# Patient Record
Sex: FEMALE | Race: WHITE | NOT HISPANIC OR LATINO | Employment: OTHER | URBAN - METROPOLITAN AREA
[De-identification: names, ages, dates, MRNs, and addresses within clinical notes are randomized per-mention and may not be internally consistent; named-entity substitution may affect disease eponyms.]

---

## 2017-01-10 RX ORDER — CARVEDILOL 25 MG/1
25 TABLET ORAL 2 TIMES DAILY WITH MEALS
Qty: 180 TABLET | Refills: 1 | Status: SHIPPED | OUTPATIENT
Start: 2017-01-10 | End: 2017-09-29 | Stop reason: SDUPTHER

## 2017-01-10 RX ORDER — CARVEDILOL 25 MG/1
TABLET ORAL
Qty: 180 TABLET | Refills: 1 | Status: SHIPPED | OUTPATIENT
Start: 2017-01-10 | End: 2017-01-10 | Stop reason: SDUPTHER

## 2017-01-25 RX ORDER — LISINOPRIL 20 MG/1
20 TABLET ORAL DAILY
Qty: 90 TABLET | Refills: 2 | Status: SHIPPED | OUTPATIENT
Start: 2017-01-25 | End: 2017-02-17 | Stop reason: DRUGHIGH

## 2017-02-09 DIAGNOSIS — R73.9 HYPERGLYCEMIA: ICD-10-CM

## 2017-02-09 DIAGNOSIS — I10 ESSENTIAL HYPERTENSION: Primary | ICD-10-CM

## 2017-02-09 DIAGNOSIS — E78.5 HYPERLIPIDEMIA, UNSPECIFIED HYPERLIPIDEMIA TYPE: ICD-10-CM

## 2017-02-10 LAB
ALBUMIN SERPL-MCNC: 4.4 G/DL (ref 3.5–5.2)
ALBUMIN/GLOB SERPL: 2 G/DL
ALP SERPL-CCNC: 52 U/L (ref 39–117)
ALT SERPL-CCNC: 23 U/L (ref 1–33)
AST SERPL-CCNC: 19 U/L (ref 1–32)
BILIRUB SERPL-MCNC: 0.2 MG/DL (ref 0.1–1.2)
BUN SERPL-MCNC: 27 MG/DL (ref 6–20)
BUN/CREAT SERPL: 27.6 (ref 7–25)
CALCIUM SERPL-MCNC: 9.7 MG/DL (ref 8.6–10.5)
CHLORIDE SERPL-SCNC: 102 MMOL/L (ref 98–107)
CHOLEST SERPL-MCNC: 193 MG/DL (ref 0–200)
CO2 SERPL-SCNC: 31.6 MMOL/L (ref 22–29)
CREAT SERPL-MCNC: 0.98 MG/DL (ref 0.57–1)
GLOBULIN SER CALC-MCNC: 2.2 GM/DL
GLUCOSE SERPL-MCNC: 114 MG/DL (ref 65–99)
HBA1C MFR BLD: 5.87 % (ref 4.8–5.6)
HDLC SERPL-MCNC: 59 MG/DL (ref 40–60)
LDLC SERPL CALC-MCNC: 103 MG/DL (ref 0–100)
LDLC/HDLC SERPL: 1.74 {RATIO}
POTASSIUM SERPL-SCNC: 5.4 MMOL/L (ref 3.5–5.2)
PROT SERPL-MCNC: 6.6 G/DL (ref 6–8.5)
SODIUM SERPL-SCNC: 144 MMOL/L (ref 136–145)
TRIGL SERPL-MCNC: 157 MG/DL (ref 0–150)
VLDLC SERPL CALC-MCNC: 31.4 MG/DL (ref 5–40)

## 2017-02-17 ENCOUNTER — OFFICE VISIT (OUTPATIENT)
Dept: INTERNAL MEDICINE | Facility: CLINIC | Age: 57
End: 2017-02-17

## 2017-02-17 VITALS
DIASTOLIC BLOOD PRESSURE: 88 MMHG | OXYGEN SATURATION: 99 % | HEART RATE: 68 BPM | SYSTOLIC BLOOD PRESSURE: 146 MMHG | WEIGHT: 218 LBS | BODY MASS INDEX: 41.19 KG/M2

## 2017-02-17 DIAGNOSIS — I10 ESSENTIAL HYPERTENSION: ICD-10-CM

## 2017-02-17 DIAGNOSIS — E87.5 HYPERKALEMIA: ICD-10-CM

## 2017-02-17 DIAGNOSIS — M89.8X1 PAIN OF RIGHT SCAPULA: Primary | ICD-10-CM

## 2017-02-17 DIAGNOSIS — E78.5 HYPERLIPIDEMIA, UNSPECIFIED HYPERLIPIDEMIA TYPE: ICD-10-CM

## 2017-02-17 LAB — POTASSIUM SERPL-SCNC: 4.8 MMOL/L (ref 3.5–5.2)

## 2017-02-17 PROCEDURE — 99214 OFFICE O/P EST MOD 30 MIN: CPT | Performed by: INTERNAL MEDICINE

## 2017-02-17 RX ORDER — GABAPENTIN 300 MG/1
300 CAPSULE ORAL 3 TIMES DAILY
Qty: 90 CAPSULE | Refills: 2 | Status: SHIPPED | OUTPATIENT
Start: 2017-02-17 | End: 2017-08-28 | Stop reason: SDUPTHER

## 2017-02-17 RX ORDER — LISINOPRIL 40 MG/1
40 TABLET ORAL DAILY
Qty: 30 TABLET | Refills: 4 | Status: SHIPPED | OUTPATIENT
Start: 2017-02-17 | End: 2017-04-03 | Stop reason: SDUPTHER

## 2017-02-17 RX ORDER — CYCLOBENZAPRINE HCL 10 MG
10 TABLET ORAL 3 TIMES DAILY PRN
Qty: 30 TABLET | Refills: 3 | Status: SHIPPED | OUTPATIENT
Start: 2017-02-17 | End: 2017-09-29 | Stop reason: SDUPTHER

## 2017-02-17 NOTE — PROGRESS NOTES
Chief Complaint   Patient presents with   • Breast Pain     Right side   • Results     blood work     Hypertension, hyperlipidemia, breast cancer, familia, CMT    History of Present Illness   Martha Davey is a 56 y.o. female presents for follow up evaluation. Having some discomfort on her right side from the scapula and radiates forward. First noted discomfort in September. She went to er and chiropracter. Gets benefit w/ chiropracter and now is in physical therapy. Previously given naproxen and clyclobenzaprine for prn usage. She is now using cyclobenzaprine qo night as well as naproxen 500 mg qd-bid. She has been out of these medications for last 2 weeks and just using tylenol. Pain has been greater off med. She has reduced gabapentin as she has concern for bladder discomfort. Due to chronic Charcot ambika tooth she uses a cane. She is supporting her weight on the right arm and carrying a purse w/ the right arm. bp is elevated. Lipids are at goal.           The following portions of the patient's history were reviewed and updated as appropriate: allergies, current medications, past family history, past medical history, past social history, past surgical history and problem list.  Current Outpatient Prescriptions on File Prior to Visit   Medication Sig Dispense Refill   • atorvastatin (LIPITOR) 10 MG tablet Take 1 tablet by mouth Daily. 90 tablet 1   • carvedilol (COREG) 25 MG tablet Take 1 tablet by mouth 2 (Two) Times a Day With Meals. 180 tablet 1   • fenofibrate (TRICOR) 145 MG tablet Take 1 tablet by mouth daily. 30 tablet 6   • FLONASE ALLERGY RELIEF 50 MCG/ACT nasal spray 2 sprays into each nostril daily.  11   • letrozole (FEMARA) 2.5 MG tablet Take 2.5 mg by mouth daily.     • [DISCONTINUED] gabapentin (NEURONTIN) 400 MG capsule Take 1 capsule by mouth Every Evening. 30 capsule 3   • [DISCONTINUED] gabapentin (NEURONTIN) 600 MG tablet TAKE 1 TABLET AT BEDTIME 90 tablet 2   • [DISCONTINUED] lisinopril  (PRINIVIL,ZESTRIL) 20 MG tablet Take 1 tablet by mouth Daily. 90 tablet 2   • mesalamine (LIALDA) 1.2 G EC tablet Take 1,200 mg by mouth 2 (two) times a day.     • nitrofurantoin, macrocrystal-monohydrate, (MACROBID) 100 MG capsule Take 100 mg by mouth daily.     • omeprazole (PriLOSEC) 40 MG capsule TAKE 1 CAPSULE DAILY 90 capsule 2     No current facility-administered medications on file prior to visit.      Review of Systems   Constitutional: Negative.    HENT: Negative.    Eyes: Negative.    Respiratory: Negative.    Cardiovascular: Negative.    Gastrointestinal: Negative.    Endocrine: Negative.    Genitourinary: Negative.    Musculoskeletal:        Right scapular pain   Skin: Negative.    Allergic/Immunologic: Negative.    Neurological: Negative.    Hematological: Negative.    Psychiatric/Behavioral: Negative.        Objective   Physical Exam   Constitutional: She is oriented to person, place, and time. She appears well-developed and well-nourished.   HENT:   Head: Normocephalic and atraumatic.   Right Ear: External ear normal.   Left Ear: External ear normal.   Nose: Nose normal.   Mouth/Throat: Oropharynx is clear and moist.   Eyes: Conjunctivae and EOM are normal. Pupils are equal, round, and reactive to light.   Neck: Normal range of motion. Neck supple.   Cardiovascular: Normal rate, regular rhythm, normal heart sounds and intact distal pulses.    Pulmonary/Chest: Effort normal and breath sounds normal.   Abdominal: Soft. Bowel sounds are normal.   Musculoskeletal:   Mild point tenderness right scapula   Neurological: She is alert and oriented to person, place, and time. She has normal reflexes.   Skin: Skin is warm and dry.   Psychiatric: She has a normal mood and affect. Her behavior is normal. Judgment and thought content normal.   Nursing note and vitals reviewed.       Visit Vitals   • /88   • Pulse 68   • Wt 218 lb (98.9 kg)   • SpO2 99%   • BMI 41.19 kg/m2       Assessment/Plan   Diagnoses  and all orders for this visit:    Pain of right scapula  -     Ambulatory Referral to Pain Management    Hyperkalemia  -     Potassium; Future    Hyperlipidemia, unspecified hyperlipidemia type    Essential hypertension    Other orders  -     lisinopril (PRINIVIL,ZESTRIL) 40 MG tablet; Take 1 tablet by mouth Daily.  -     gabapentin (NEURONTIN) 300 MG capsule; Take 1 capsule by mouth 3 (Three) Times a Day.  -     cyclobenzaprine (FLEXERIL) 10 MG tablet; Take 1 tablet by mouth 3 (Three) Times a Day As Needed for muscle spasms.      Patient w/ hypertension- will increase lisinopril to 40 daily if potassium is wnl today (suspect initial was spurious). Will continue med for lipids as this is working well. She will go to pain management for scapular pain and continue w/ PT and prn chiropracter. She is advised to d/c nsaids. May split gabapentin dose and use flexeril when needed. She should off load her left shoulder and work to put less pressure on it while ambulating. F/u routinely.   bp test 2 weeks.

## 2017-02-27 ENCOUNTER — OFFICE VISIT (OUTPATIENT)
Dept: INTERNAL MEDICINE | Facility: CLINIC | Age: 57
End: 2017-02-27

## 2017-02-27 VITALS
OXYGEN SATURATION: 98 % | DIASTOLIC BLOOD PRESSURE: 74 MMHG | HEART RATE: 61 BPM | SYSTOLIC BLOOD PRESSURE: 132 MMHG | BODY MASS INDEX: 40.62 KG/M2 | WEIGHT: 215 LBS

## 2017-02-27 DIAGNOSIS — G47.33 OSA (OBSTRUCTIVE SLEEP APNEA): ICD-10-CM

## 2017-02-27 DIAGNOSIS — R31.9 HEMATURIA: ICD-10-CM

## 2017-02-27 DIAGNOSIS — M54.6 CHRONIC RIGHT-SIDED THORACIC BACK PAIN: ICD-10-CM

## 2017-02-27 DIAGNOSIS — R39.9 UTI SYMPTOMS: Primary | ICD-10-CM

## 2017-02-27 DIAGNOSIS — G89.29 CHRONIC RIGHT-SIDED THORACIC BACK PAIN: ICD-10-CM

## 2017-02-27 PROBLEM — K57.20 DIVERTICULITIS OF COLON WITH PERFORATION: Status: ACTIVE | Noted: 2017-02-27

## 2017-02-27 PROBLEM — K21.9 GASTROESOPHAGEAL REFLUX DISEASE: Status: ACTIVE | Noted: 2017-02-27

## 2017-02-27 PROBLEM — K63.5 COLON POLYP: Status: ACTIVE | Noted: 2017-02-27

## 2017-02-27 LAB
BILIRUB BLD-MCNC: NEGATIVE MG/DL
CLARITY, POC: CLEAR
COLOR UR: YELLOW
GLUCOSE UR STRIP-MCNC: NEGATIVE MG/DL
KETONES UR QL: NEGATIVE
LEUKOCYTE EST, POC: ABNORMAL
NITRITE UR-MCNC: NEGATIVE MG/ML
PH UR: 5.5 [PH] (ref 5–8)
PROT UR STRIP-MCNC: NEGATIVE MG/DL
RBC # UR STRIP: ABNORMAL /UL
SP GR UR: 1.01 (ref 1–1.03)
UROBILINOGEN UR QL: NORMAL

## 2017-02-27 PROCEDURE — 99214 OFFICE O/P EST MOD 30 MIN: CPT | Performed by: INTERNAL MEDICINE

## 2017-02-27 PROCEDURE — 81003 URINALYSIS AUTO W/O SCOPE: CPT | Performed by: INTERNAL MEDICINE

## 2017-02-27 NOTE — PROGRESS NOTES
Chief Complaint   Patient presents with   • Urinary Tract Infection   • Sleep Apnea       History of Present Illness   Martha Davey is a 56 y.o. female presents for follow up evaluation. Doing well today. Has MARIA M. Needs a new mask and possibly a new CPAP device. She has not been to her sleep medicine specialist in at least 5 years. She is wearing the device throughout the night for at least 6-7 hours nightly. Has not had settings or fit checked in a long time. She is having good sleep.   Has a h/o recurrent UTI. Started w/ hematuria on thur fri. Off of prophylactic macrobid per urology instructions  (Dr. May Johnson) as she was doing well after bladder procedure.   Continued discomfort in the right scapular region. Referred to pain management for possible trigger point injections on 2/17 but has not received contact from scheduling for this.       The following portions of the patient's history were reviewed and updated as appropriate: allergies, current medications, past family history, past medical history, past social history, past surgical history and problem list.  Current Outpatient Prescriptions on File Prior to Visit   Medication Sig Dispense Refill   • atorvastatin (LIPITOR) 10 MG tablet Take 1 tablet by mouth Daily. 90 tablet 1   • carvedilol (COREG) 25 MG tablet Take 1 tablet by mouth 2 (Two) Times a Day With Meals. 180 tablet 1   • cyclobenzaprine (FLEXERIL) 10 MG tablet Take 1 tablet by mouth 3 (Three) Times a Day As Needed for muscle spasms. 30 tablet 3   • fenofibrate (TRICOR) 145 MG tablet Take 1 tablet by mouth daily. 30 tablet 6   • FLONASE ALLERGY RELIEF 50 MCG/ACT nasal spray 2 sprays into each nostril daily.  11   • gabapentin (NEURONTIN) 300 MG capsule Take 1 capsule by mouth 3 (Three) Times a Day. 90 capsule 2   • letrozole (FEMARA) 2.5 MG tablet Take 2.5 mg by mouth daily.     • lisinopril (PRINIVIL,ZESTRIL) 40 MG tablet Take 1 tablet by mouth Daily. 30 tablet 4   • mesalamine (LIALDA) 1.2  G EC tablet Take 1,200 mg by mouth 2 (two) times a day.     • nitrofurantoin, macrocrystal-monohydrate, (MACROBID) 100 MG capsule Take 100 mg by mouth daily.     • omeprazole (PriLOSEC) 40 MG capsule TAKE 1 CAPSULE DAILY 90 capsule 2     No current facility-administered medications on file prior to visit.      Review of Systems   Constitutional: Negative.    HENT: Negative.    Eyes: Negative.    Respiratory: Negative.    Cardiovascular: Negative.    Gastrointestinal: Negative.    Endocrine: Negative.    Genitourinary: Positive for dysuria, frequency, hematuria and urgency.   Musculoskeletal: Positive for back pain.   Skin: Negative.    Allergic/Immunologic: Negative.    Neurological: Negative.    Hematological: Negative.    Psychiatric/Behavioral: Negative.        Objective   Physical Exam   Constitutional: She is oriented to person, place, and time. She appears well-developed and well-nourished.   HENT:   Head: Normocephalic and atraumatic.   Right Ear: External ear normal.   Left Ear: External ear normal.   Mouth/Throat: Oropharynx is clear and moist.   Eyes: Conjunctivae and EOM are normal. Pupils are equal, round, and reactive to light.   Neck: Normal range of motion. Neck supple.   Cardiovascular: Normal rate, regular rhythm, normal heart sounds and intact distal pulses.    Pulmonary/Chest: Effort normal and breath sounds normal.   Abdominal: Soft. Bowel sounds are normal.   Musculoskeletal: Normal range of motion.   Neurological: She is alert and oriented to person, place, and time. She has normal reflexes.   Skin: Skin is warm and dry.   Psychiatric: She has a normal mood and affect. Her behavior is normal. Judgment and thought content normal.   Nursing note and vitals reviewed.       Visit Vitals   • /74   • Pulse 61   • Wt 215 lb (97.5 kg)   • SpO2 98%   • BMI 40.62 kg/m2       Assessment/Plan   Diagnoses and all orders for this visit:    UTI symptoms  -     POC Urinalysis Dipstick, Automated  -      Urine Culture    MARIA M (obstructive sleep apnea)  -     Ambulatory Referral to Sleep Medicine    Hematuria    Chronic right-sided thoracic back pain      Patient w/ hematuria/ likely UTI. Will complete bid macrobid for 7 days. Check urine culture and may need to change antibiotic will check culture result. She has MARIA M. Needs to have CPAP settings and mask fit evaluated. Wrote rx to update mask and tubing and referred to sleep medicine specialist. She is having back pain. Will check on appointment w/ pain management for possible trigger point injections. F/u prn.

## 2017-03-01 LAB
BACTERIA UR CULT: NORMAL
BACTERIA UR CULT: NORMAL

## 2017-03-17 ENCOUNTER — TELEPHONE (OUTPATIENT)
Dept: INTERNAL MEDICINE | Facility: CLINIC | Age: 57
End: 2017-03-17

## 2017-03-17 RX ORDER — MELOXICAM 15 MG/1
15 TABLET ORAL DAILY
Qty: 30 TABLET | Refills: 6 | Status: SHIPPED | OUTPATIENT
Start: 2017-03-17 | End: 2017-12-16 | Stop reason: HOSPADM

## 2017-03-17 NOTE — TELEPHONE ENCOUNTER
Would like a referral to [facility]ert for pain priscila as Confucianist pain priscila is taking to long also she would like to add mobic  To see if that can help. She is taking 3 tylenol qd.

## 2017-04-03 RX ORDER — LISINOPRIL 40 MG/1
40 TABLET ORAL DAILY
Qty: 90 TABLET | Refills: 1 | Status: SHIPPED | OUTPATIENT
Start: 2017-04-03 | End: 2017-07-01 | Stop reason: SDUPTHER

## 2017-07-03 RX ORDER — LISINOPRIL 40 MG/1
TABLET ORAL
Qty: 90 TABLET | Refills: 1 | Status: SHIPPED | OUTPATIENT
Start: 2017-07-03 | End: 2017-09-29 | Stop reason: SDUPTHER

## 2017-08-07 ENCOUNTER — TELEPHONE (OUTPATIENT)
Dept: INTERNAL MEDICINE | Facility: CLINIC | Age: 57
End: 2017-08-07

## 2017-08-07 DIAGNOSIS — G47.33 OSA (OBSTRUCTIVE SLEEP APNEA): Primary | ICD-10-CM

## 2017-08-28 RX ORDER — GABAPENTIN 300 MG/1
300 CAPSULE ORAL 3 TIMES DAILY
Qty: 90 CAPSULE | Refills: 2 | Status: SHIPPED | OUTPATIENT
Start: 2017-08-28 | End: 2018-02-26 | Stop reason: SDUPTHER

## 2017-09-24 DIAGNOSIS — R31.9 HEMATURIA: ICD-10-CM

## 2017-09-24 DIAGNOSIS — I10 ESSENTIAL HYPERTENSION: ICD-10-CM

## 2017-09-24 DIAGNOSIS — E78.5 HYPERLIPIDEMIA, UNSPECIFIED HYPERLIPIDEMIA TYPE: Primary | ICD-10-CM

## 2017-09-24 DIAGNOSIS — K21.9 GASTROESOPHAGEAL REFLUX DISEASE, ESOPHAGITIS PRESENCE NOT SPECIFIED: ICD-10-CM

## 2017-09-24 DIAGNOSIS — R73.9 HYPERGLYCEMIA: ICD-10-CM

## 2017-09-25 ENCOUNTER — LAB (OUTPATIENT)
Dept: INTERNAL MEDICINE | Facility: CLINIC | Age: 57
End: 2017-09-25

## 2017-09-25 PROCEDURE — G0008 ADMIN INFLUENZA VIRUS VAC: HCPCS | Performed by: INTERNAL MEDICINE

## 2017-09-25 PROCEDURE — 90656 IIV3 VACC NO PRSV 0.5 ML IM: CPT | Performed by: INTERNAL MEDICINE

## 2017-09-27 LAB
ALBUMIN SERPL-MCNC: 4.4 G/DL (ref 3.5–5.2)
ALBUMIN/GLOB SERPL: 2.2 G/DL
ALP SERPL-CCNC: 72 U/L (ref 39–117)
ALT SERPL-CCNC: 16 U/L (ref 1–33)
APPEARANCE UR: CLEAR
AST SERPL-CCNC: 15 U/L (ref 1–32)
BACTERIA #/AREA URNS HPF: NORMAL /HPF
BACTERIA UR CULT: NORMAL
BACTERIA UR CULT: NORMAL
BASOPHILS # BLD AUTO: 0.05 10*3/MM3 (ref 0–0.2)
BASOPHILS NFR BLD AUTO: 0.8 % (ref 0–1.5)
BILIRUB SERPL-MCNC: 0.3 MG/DL (ref 0.1–1.2)
BILIRUB UR QL STRIP: NEGATIVE
BUN SERPL-MCNC: 23 MG/DL (ref 6–20)
BUN/CREAT SERPL: 25.8 (ref 7–25)
CALCIUM SERPL-MCNC: 10.1 MG/DL (ref 8.6–10.5)
CHLORIDE SERPL-SCNC: 101 MMOL/L (ref 98–107)
CHOLEST SERPL-MCNC: 166 MG/DL (ref 0–200)
CO2 SERPL-SCNC: 31.2 MMOL/L (ref 22–29)
COLOR UR: YELLOW
CREAT SERPL-MCNC: 0.89 MG/DL (ref 0.57–1)
EOSINOPHIL # BLD AUTO: 0.2 10*3/MM3 (ref 0–0.7)
EOSINOPHIL NFR BLD AUTO: 3.2 % (ref 0.3–6.2)
EPI CELLS #/AREA URNS HPF: NORMAL /HPF
ERYTHROCYTE [DISTWIDTH] IN BLOOD BY AUTOMATED COUNT: 13 % (ref 11.7–13)
GLOBULIN SER CALC-MCNC: 2 GM/DL
GLUCOSE SERPL-MCNC: 110 MG/DL (ref 65–99)
GLUCOSE UR QL: NEGATIVE
HCT VFR BLD AUTO: 42 % (ref 35.6–45.5)
HDLC SERPL-MCNC: 55 MG/DL (ref 40–60)
HGB BLD-MCNC: 13 G/DL (ref 11.9–15.5)
HGB UR QL STRIP: NEGATIVE
IMM GRANULOCYTES # BLD: 0.02 10*3/MM3 (ref 0–0.03)
IMM GRANULOCYTES NFR BLD: 0.3 % (ref 0–0.5)
KETONES UR QL STRIP: NEGATIVE
LDLC SERPL CALC-MCNC: 70 MG/DL (ref 0–100)
LDLC/HDLC SERPL: 1.28 {RATIO}
LEUKOCYTE ESTERASE UR QL STRIP: NEGATIVE
LYMPHOCYTES # BLD AUTO: 1.7 10*3/MM3 (ref 0.9–4.8)
LYMPHOCYTES NFR BLD AUTO: 27.4 % (ref 19.6–45.3)
MCH RBC QN AUTO: 29.4 PG (ref 26.9–32)
MCHC RBC AUTO-ENTMCNC: 31 G/DL (ref 32.4–36.3)
MCV RBC AUTO: 95 FL (ref 80.5–98.2)
MICRO URNS: ABNORMAL
MONOCYTES # BLD AUTO: 0.47 10*3/MM3 (ref 0.2–1.2)
MONOCYTES NFR BLD AUTO: 7.6 % (ref 5–12)
MUCOUS THREADS URNS QL MICRO: PRESENT /HPF
NEUTROPHILS # BLD AUTO: 3.77 10*3/MM3 (ref 1.9–8.1)
NEUTROPHILS NFR BLD AUTO: 60.7 % (ref 42.7–76)
NITRITE UR QL STRIP: POSITIVE
PH UR STRIP: 7 [PH] (ref 5–7.5)
PLATELET # BLD AUTO: 281 10*3/MM3 (ref 140–500)
POTASSIUM SERPL-SCNC: 4.9 MMOL/L (ref 3.5–5.2)
PROT SERPL-MCNC: 6.4 G/DL (ref 6–8.5)
PROT UR QL STRIP: NEGATIVE
RBC # BLD AUTO: 4.42 10*6/MM3 (ref 3.9–5.2)
RBC #/AREA URNS HPF: NORMAL /HPF
SODIUM SERPL-SCNC: 143 MMOL/L (ref 136–145)
SP GR UR: 1.01 (ref 1–1.03)
TRIGL SERPL-MCNC: 203 MG/DL (ref 0–150)
TSH SERPL DL<=0.005 MIU/L-ACNC: 2.65 MIU/ML (ref 0.27–4.2)
URINALYSIS REFLEX: ABNORMAL
UROBILINOGEN UR STRIP-MCNC: 0.2 MG/DL (ref 0.2–1)
VLDLC SERPL CALC-MCNC: 40.6 MG/DL (ref 5–40)
WBC # BLD AUTO: 6.21 10*3/MM3 (ref 4.5–10.7)
WBC #/AREA URNS HPF: NORMAL /HPF

## 2017-09-29 ENCOUNTER — HOSPITAL ENCOUNTER (OUTPATIENT)
Dept: SLEEP MEDICINE | Facility: HOSPITAL | Age: 57
Discharge: HOME | End: 2017-09-29
Admitting: INTERNAL MEDICINE

## 2017-09-29 ENCOUNTER — OFFICE VISIT (OUTPATIENT)
Dept: INTERNAL MEDICINE | Facility: CLINIC | Age: 57
End: 2017-09-29

## 2017-09-29 VITALS
HEIGHT: 62 IN | BODY MASS INDEX: 39.56 KG/M2 | SYSTOLIC BLOOD PRESSURE: 130 MMHG | OXYGEN SATURATION: 98 % | WEIGHT: 215 LBS | HEART RATE: 75 BPM | DIASTOLIC BLOOD PRESSURE: 70 MMHG

## 2017-09-29 DIAGNOSIS — Z00.00 HEALTHCARE MAINTENANCE: Primary | ICD-10-CM

## 2017-09-29 DIAGNOSIS — R73.9 HYPERGLYCEMIA: ICD-10-CM

## 2017-09-29 DIAGNOSIS — E78.5 HYPERLIPIDEMIA, UNSPECIFIED HYPERLIPIDEMIA TYPE: ICD-10-CM

## 2017-09-29 DIAGNOSIS — M54.6 MIDLINE THORACIC BACK PAIN, UNSPECIFIED CHRONICITY: ICD-10-CM

## 2017-09-29 DIAGNOSIS — I10 ESSENTIAL HYPERTENSION: ICD-10-CM

## 2017-09-29 PROCEDURE — G0463 HOSPITAL OUTPT CLINIC VISIT: HCPCS

## 2017-09-29 PROCEDURE — G0439 PPPS, SUBSEQ VISIT: HCPCS | Performed by: INTERNAL MEDICINE

## 2017-09-29 PROCEDURE — 99213 OFFICE O/P EST LOW 20 MIN: CPT | Performed by: INTERNAL MEDICINE

## 2017-09-29 RX ORDER — CARVEDILOL 25 MG/1
25 TABLET ORAL 2 TIMES DAILY WITH MEALS
Qty: 180 TABLET | Refills: 3 | Status: SHIPPED | OUTPATIENT
Start: 2017-09-29 | End: 2017-12-05

## 2017-09-29 RX ORDER — CYCLOBENZAPRINE HCL 10 MG
10 TABLET ORAL 3 TIMES DAILY PRN
Qty: 30 TABLET | Refills: 3 | Status: SHIPPED | OUTPATIENT
Start: 2017-09-29 | End: 2018-02-26 | Stop reason: SDUPTHER

## 2017-09-29 RX ORDER — DIAZEPAM 2 MG/1
2 TABLET ORAL 2 TIMES DAILY PRN
Qty: 3 TABLET | Refills: 2 | Status: SHIPPED | OUTPATIENT
Start: 2017-09-29 | End: 2017-10-10 | Stop reason: SDUPTHER

## 2017-09-29 RX ORDER — LISINOPRIL 40 MG/1
40 TABLET ORAL DAILY
Qty: 90 TABLET | Refills: 3 | Status: SHIPPED | OUTPATIENT
Start: 2017-09-29 | End: 2017-12-05

## 2017-09-29 RX ORDER — ATORVASTATIN CALCIUM 10 MG/1
10 TABLET, FILM COATED ORAL DAILY
Qty: 90 TABLET | Refills: 3 | Status: SHIPPED | OUTPATIENT
Start: 2017-09-29 | End: 2018-02-26

## 2017-09-29 RX ORDER — DIAZEPAM 2 MG/1
2 TABLET ORAL 2 TIMES DAILY PRN
Qty: 3 TABLET | Refills: 2 | Status: SHIPPED | OUTPATIENT
Start: 2017-09-29 | End: 2017-09-29 | Stop reason: SDUPTHER

## 2017-09-29 NOTE — PROGRESS NOTES
Subjective   CPE, midthoracic back pain,     Martha Davey is a 56 y.o. female who presents for a complete physical exam.  In general she is doing well today. She has some mid thoracic back pain, left shoulder, and right knee pain. she is taking meloxicam daily. She was in office in feb w/ same pain and was referred for physical therapy. She has tried this as well as going to the chiropracter and has persistent discomfort. She has hypertension and bp has been normotensive.        Review of Systems   Constitutional: Negative.    HENT: Negative.    Eyes: Negative.    Respiratory: Negative.    Cardiovascular: Negative.    Gastrointestinal: Negative.    Endocrine: Negative.    Genitourinary: Negative.    Musculoskeletal: Positive for arthralgias.   Skin: Negative.    Allergic/Immunologic: Negative.    Neurological: Negative.    Hematological: Negative.    Psychiatric/Behavioral: Negative.        The following portions of the patient's history were reviewed and updated as appropriate: allergies, current medications, past family history, past medical history, past social history, past surgical history and problem list.     Patient Active Problem List   Diagnosis   • Malignant neoplasm of breast   • Hematuria   • Hyperlipidemia   • Hypertension   • Hereditary sensorimotor neuropathy   • Hyperglycemia   • MARIA M (obstructive sleep apnea)   • Gastroesophageal reflux disease   • Colon polyp   • Diverticulitis of colon with perforation   • Foot pain   • Osteoarthritis of knee   • Leukocytosis   • Osteoarthritis of shoulder   • Senile osteopenia   • Long term current use of aromatase inhibitor   • Chronic right-sided thoracic back pain       Past Medical History:   Diagnosis Date   • Cancer     breast   • Hyperlipidemia    • Hypertension        Past Surgical History:   Procedure Laterality Date   • CHOLECYSTECTOMY     • HERNIA REPAIR         Family History   Problem Relation Age of Onset   • Heart disease Mother    •  Hyperlipidemia Mother    • Diabetes Father        Social History     Social History   • Marital status:      Spouse name: N/A   • Number of children: N/A   • Years of education: N/A     Occupational History   • Not on file.     Social History Main Topics   • Smoking status: Never Smoker   • Smokeless tobacco: Not on file   • Alcohol use Yes      Comment: social   • Drug use: Defer   • Sexual activity: Defer     Other Topics Concern   • Not on file     Social History Narrative   • No narrative on file       Current Outpatient Prescriptions on File Prior to Visit   Medication Sig Dispense Refill   • FLONASE ALLERGY RELIEF 50 MCG/ACT nasal spray 2 sprays into each nostril daily.  11   • gabapentin (NEURONTIN) 300 MG capsule Take 1 capsule by mouth 3 (Three) Times a Day. 90 capsule 2   • letrozole (FEMARA) 2.5 MG tablet Take 2.5 mg by mouth daily.     • meloxicam (MOBIC) 15 MG tablet Take 1 tablet by mouth Daily. 30 tablet 6   • mesalamine (LIALDA) 1.2 G EC tablet Take 1,200 mg by mouth 2 (two) times a day.     • nitrofurantoin, macrocrystal-monohydrate, (MACROBID) 100 MG capsule Take 100 mg by mouth daily.     • omeprazole (PriLOSEC) 40 MG capsule TAKE 1 CAPSULE DAILY 90 capsule 2   • [DISCONTINUED] atorvastatin (LIPITOR) 10 MG tablet Take 1 tablet by mouth Daily. 90 tablet 1   • [DISCONTINUED] carvedilol (COREG) 25 MG tablet Take 1 tablet by mouth 2 (Two) Times a Day With Meals. 180 tablet 1   • [DISCONTINUED] cyclobenzaprine (FLEXERIL) 10 MG tablet Take 1 tablet by mouth 3 (Three) Times a Day As Needed for muscle spasms. 30 tablet 3   • [DISCONTINUED] lisinopril (PRINIVIL,ZESTRIL) 40 MG tablet Take 1 tablet by mouth  daily 90 tablet 1   • fenofibrate (TRICOR) 145 MG tablet Take 1 tablet by mouth daily. 30 tablet 6     No current facility-administered medications on file prior to visit.        Allergies   Allergen Reactions   • Morphine And Related        Immunization History   Administered Date(s) Administered  "  • Flu Vaccine Quad PF >18YRS 09/25/2017   • Influenza TIV (IM) 10/11/2016   • Pneumococcal Conjugate 13-Valent 05/11/2015   • Pneumococcal Polysaccharide 01/01/2008   • Tdap 01/01/2008       Objective     /70  Pulse 75  Ht 62\" (157.5 cm)  Wt 215 lb (97.5 kg)  SpO2 98%  BMI 39.32 kg/m2    Physical Exam   Constitutional: She is oriented to person, place, and time. She appears well-developed and well-nourished.   HENT:   Head: Normocephalic and atraumatic.   Right Ear: External ear normal.   Left Ear: External ear normal.   Nose: Nose normal.   Mouth/Throat: Oropharynx is clear and moist.   Eyes: EOM are normal. Pupils are equal, round, and reactive to light.   Neck: Neck supple.   Cardiovascular: Normal rate, regular rhythm and normal heart sounds.    Pulmonary/Chest: Effort normal and breath sounds normal. No respiratory distress.   Abdominal: Soft.   Genitourinary: Vagina normal and uterus normal. No vaginal discharge found.   Musculoskeletal:   Chronic leg weakness. In brace w/ good benefit.    Neurological: She is alert and oriented to person, place, and time.   Skin: Skin is warm and dry.   Psychiatric: She has a normal mood and affect. Her behavior is normal. Judgment and thought content normal.   Nursing note and vitals reviewed.  no cognitive impairment.   ekg sinus. No st changes. Unchanged from prior.   Assessment/Plan   Martha was seen today for annual exam and back pain.    Diagnoses and all orders for this visit:    Healthcare maintenance    Essential hypertension  -     ECG 12 Lead    Midline thoracic back pain, unspecified chronicity  -     Ambulatory Referral to Pain Management  -     MRI thoracic spine w wo contrast; Future    Hyperlipidemia, unspecified hyperlipidemia type    Hyperglycemia    Other orders  -     cyclobenzaprine (FLEXERIL) 10 MG tablet; Take 1 tablet by mouth 3 (Three) Times a Day As Needed for Muscle Spasms.  -     atorvastatin (LIPITOR) 10 MG tablet; Take 1 tablet by " mouth Daily.  -     carvedilol (COREG) 25 MG tablet; Take 1 tablet by mouth 2 (Two) Times a Day With Meals.  -     lisinopril (PRINIVIL,ZESTRIL) 40 MG tablet; Take 1 tablet by mouth Daily.  -     Discontinue: diazePAM (VALIUM) 2 MG tablet; Take 1 tablet by mouth 2 (Two) Times a Day As Needed for Anxiety.  -     diazePAM (VALIUM) 2 MG tablet; Take 1 tablet by mouth 2 (Two) Times a Day As Needed for Anxiety.        Discussion    Patient presents today for a CPE.  She is doing well today. She is having continued midthoracic back pain. Will check a thoracic mri. Refer to pain management. She will continue exercises as given by her therapist. She may take tylenol if needed. She will continue med for lipid regulation. Will follow up with her orthopedist routinely. Her personalized prevention plan was reviewed and a copy was given.     Patient advised dietary modifications- low fat and low carb.   Patient follows an inadequate exercise regimen and is advised more water or low impact aerobics. Mammogram is up to date.   Colon cancer screening is up to date.   Pap smears are performed by the patient's gynecologist.   Immunizations are up to date.   DEXA is up to date.           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 Patient advised of findings. She will be started on a dexamethasone steroid taper. She may increase neurontin to 600 mg tid. Tramadol prn pain. Will contact spinal surgery and oncology for further treatment. She is moving all extremeties well at this time although did notice new onset ankle pain. She will call 911 if any acute symtpoms develop.

## 2017-09-29 NOTE — PATIENT INSTRUCTIONS
Medicare Wellness  Personal Prevention Plan of Service     Date of Office Visit:  2017  Encounter Provider:  Jazmine Chanel MD  Place of Service:  [facility]  Patient Name: Martha Davey  :  1960    As part of the Medicare Wellness portion of your visit today, we are providing you with this personalized preventive plan of services (PPPS). This plan is based upon recommendations of the [place] Preventive Services Task Force (USPSTF) and the Advisory Committee on Immunization Practices (ACIP).    This lists the preventive care services that should be considered, and provides dates of when you are due. Items listed as completed are up-to-date and do not require any further intervention.    Health Maintenance   Topic Date Due   • DXA SCAN  2017   • TDAP/TD VACCINES (2 - Td) 2018   • LIPID PANEL  2018   • MEDICARE ANNUAL WELLNESS  2018   • MAMMOGRAM  2018   • COLONOSCOPY  2025   • HEPATITIS C SCREENING  Addressed   • INFLUENZA VACCINE  Completed   • PAP SMEAR  Excluded       Orders Placed This Encounter   Procedures   • MRI thoracic spine w wo contrast     Standing Status:   Future     Standing Expiration Date:   2018     Order Specific Question:   Reason for Exam:     Answer:   mid thoracic back pain. breast cancer.     Order Specific Question:   Patient Pregnant     Answer:   No   • Ambulatory Referral to Pain Management     Referral Priority:   Routine     Referral Type:   Pain Management     Referral Reason:   Specialty Services Required     Requested Specialty:   Pain Medicine     Number of Visits Requested:   1   • ECG 12 Lead     Order Specific Question:   Reason for Exam:     Answer:   hm       No Follow-up on file.

## 2017-10-08 ENCOUNTER — HOSPITAL ENCOUNTER (OUTPATIENT)
Dept: MRI IMAGING | Facility: HOSPITAL | Age: 57
Discharge: HOME | End: 2017-10-08
Admitting: INTERNAL MEDICINE

## 2017-10-08 DIAGNOSIS — M54.6 MIDLINE THORACIC BACK PAIN, UNSPECIFIED CHRONICITY: ICD-10-CM

## 2017-10-08 PROCEDURE — 0 GADOBENATE DIMEGLUMINE 529 MG/ML SOLUTION: Performed by: INTERNAL MEDICINE

## 2017-10-08 PROCEDURE — A9577 INJ MULTIHANCE: HCPCS | Performed by: INTERNAL MEDICINE

## 2017-10-08 PROCEDURE — 72157 MRI CHEST SPINE W/O & W/DYE: CPT

## 2017-10-08 RX ADMIN — GADOBENATE DIMEGLUMINE 20 ML: 529 INJECTION, SOLUTION INTRAVENOUS at 13:45

## 2017-10-09 ENCOUNTER — TELEPHONE (OUTPATIENT)
Dept: NEUROSURGERY | Facility: CLINIC | Age: 57
End: 2017-10-09

## 2017-10-09 RX ORDER — DEXAMETHASONE 4 MG/1
8 TABLET ORAL 2 TIMES DAILY WITH MEALS
Qty: 30 TABLET | Refills: 1 | Status: ON HOLD | OUTPATIENT
Start: 2017-10-09 | End: 2017-12-16

## 2017-10-09 RX ORDER — TRAMADOL HYDROCHLORIDE 50 MG/1
50 TABLET ORAL EVERY 8 HOURS PRN
Qty: 90 TABLET | Refills: 1 | Status: ON HOLD | OUTPATIENT
Start: 2017-10-09 | End: 2017-12-15

## 2017-10-10 DIAGNOSIS — Z17.0 MALIGNANT NEOPLASM OF RIGHT BREAST IN FEMALE, ESTROGEN RECEPTOR POSITIVE, UNSPECIFIED SITE OF BREAST (HCC): ICD-10-CM

## 2017-10-10 DIAGNOSIS — C79.51 SPINAL CORD COMPRESSION DUE TO MALIGNANT NEOPLASM METASTATIC TO SPINE (HCC): Primary | ICD-10-CM

## 2017-10-10 DIAGNOSIS — G95.29 SPINAL CORD COMPRESSION DUE TO MALIGNANT NEOPLASM METASTATIC TO SPINE (HCC): Primary | ICD-10-CM

## 2017-10-10 DIAGNOSIS — C50.911 MALIGNANT NEOPLASM OF RIGHT BREAST IN FEMALE, ESTROGEN RECEPTOR POSITIVE, UNSPECIFIED SITE OF BREAST (HCC): ICD-10-CM

## 2017-10-10 RX ORDER — DIAZEPAM 2 MG/1
2 TABLET ORAL 2 TIMES DAILY PRN
Qty: 12 TABLET | Refills: 3 | Status: SHIPPED | OUTPATIENT
Start: 2017-10-10 | End: 2017-12-05

## 2017-10-13 ENCOUNTER — DOCUMENTATION (OUTPATIENT)
Dept: INTERNAL MEDICINE | Facility: CLINIC | Age: 57
End: 2017-10-13

## 2017-10-13 NOTE — PROGRESS NOTES
Patient w/ MRI w/ mult bone mets. Possible spine involvement. Contacted neurosurgery 10/9 and started dexamethasone. Dr. Hoffman advised to get additional imaging and consider radiation to area. Called patient's oncologist 10/10 and ordered ct chest abdomen and pelvis to eval for other mets and decide best site for biopsy. Additionally ordered MRI cspine and Lspine to see if additional XRT needed in spine. Bone scan for extent of involvement. Appointment scheduled w/ oncology. She is advised to go to ER if develops any warning symptoms of cord compression. She reports adequate control of pain but may increase neurontin if needed.   JW

## 2017-11-01 ENCOUNTER — APPOINTMENT (OUTPATIENT)
Dept: ONCOLOGY | Facility: CLINIC | Age: 57
End: 2017-11-01

## 2017-11-01 ENCOUNTER — APPOINTMENT (OUTPATIENT)
Dept: OTHER | Facility: HOSPITAL | Age: 57
End: 2017-11-01

## 2017-11-03 ENCOUNTER — TELEPHONE (OUTPATIENT)
Dept: INTERNAL MEDICINE | Facility: CLINIC | Age: 57
End: 2017-11-03

## 2017-11-03 NOTE — TELEPHONE ENCOUNTER
----- Message from Jamzine Chanel MD sent at 11/3/2017 12:59 PM EDT -----  Advise patient that she may stop lipitor. Watch bp. If low we can reduce lisinopril. She can f/u here if has any needs.         Lm for pt

## 2017-11-17 ENCOUNTER — APPOINTMENT (OUTPATIENT)
Dept: SLEEP MEDICINE | Facility: HOSPITAL | Age: 57
End: 2017-11-17

## 2017-11-21 ENCOUNTER — APPOINTMENT (OUTPATIENT)
Dept: ONCOLOGY | Facility: CLINIC | Age: 57
End: 2017-11-21

## 2017-11-21 ENCOUNTER — APPOINTMENT (OUTPATIENT)
Dept: LAB | Facility: HOSPITAL | Age: 57
End: 2017-11-21

## 2017-11-28 ENCOUNTER — TELEPHONE (OUTPATIENT)
Dept: INTERNAL MEDICINE | Facility: CLINIC | Age: 57
End: 2017-11-28

## 2017-11-28 NOTE — TELEPHONE ENCOUNTER
Mr Davey would like you to call him today if possible.   Has a few questions he would like to ask you.

## 2017-11-28 NOTE — TELEPHONE ENCOUNTER
----- Message from Jazmine Chanel MD sent at 11/28/2017  1:11 PM EST -----  Call [facility] rehab facility  Ask for medications and blood pressure readings.  If not testing blood pressure please have them test bp 4 times weekly        Called and spoke with Elijah said she would fax her med list over and her BP readings for the last few days

## 2017-12-04 ENCOUNTER — TELEPHONE (OUTPATIENT)
Dept: ORTHOPEDIC SURGERY | Facility: CLINIC | Age: 57
End: 2017-12-04

## 2017-12-04 ENCOUNTER — TELEPHONE (OUTPATIENT)
Dept: INTERNAL MEDICINE | Facility: CLINIC | Age: 57
End: 2017-12-04

## 2017-12-04 DIAGNOSIS — S82.202A FRACTURE TIBIA/FIBULA, LEFT, CLOSED, INITIAL ENCOUNTER: Primary | ICD-10-CM

## 2017-12-04 DIAGNOSIS — S82.402A FRACTURE TIBIA/FIBULA, LEFT, CLOSED, INITIAL ENCOUNTER: Primary | ICD-10-CM

## 2017-12-04 NOTE — TELEPHONE ENCOUNTER
Referral is in the chart. It looks like her appointment is at 745 tomorrow morning w/ dr. Solorzano but he can verify this w/ [facility].

## 2017-12-04 NOTE — TELEPHONE ENCOUNTER
Pt's left leg was broken and snapped above the knee on Friday.  called to let  know she is going to [facility] today from the ambulance and needs an order.  didn't want to take her to the hospital on Friday because of her immune system being low and wait time at the hospital. He said because she's a new pt she would have to see a doctor instead of a nurse practitioner.

## 2017-12-05 ENCOUNTER — OFFICE VISIT (OUTPATIENT)
Dept: ORTHOPEDIC SURGERY | Facility: CLINIC | Age: 57
End: 2017-12-05

## 2017-12-05 ENCOUNTER — HOSPITAL ENCOUNTER (INPATIENT)
Facility: HOSPITAL | Age: 57
LOS: 11 days | Discharge: NURSING, REHAB OR LONG-TERM CARE | End: 2017-12-16
Attending: EMERGENCY MEDICINE | Admitting: ORTHOPAEDIC SURGERY

## 2017-12-05 ENCOUNTER — APPOINTMENT (OUTPATIENT)
Dept: CARDIOLOGY | Facility: HOSPITAL | Age: 57
End: 2017-12-05
Attending: ORTHOPAEDIC SURGERY

## 2017-12-05 VITALS — TEMPERATURE: 96.9 F | WEIGHT: 191 LBS | HEIGHT: 55 IN | BODY MASS INDEX: 44.2 KG/M2

## 2017-12-05 DIAGNOSIS — S82.242A CLOSED DISPLACED SPIRAL FRACTURE OF SHAFT OF LEFT TIBIA, INITIAL ENCOUNTER: Primary | ICD-10-CM

## 2017-12-05 DIAGNOSIS — S82.202A CLOSED FRACTURE OF LEFT TIBIA AND FIBULA, INITIAL ENCOUNTER: ICD-10-CM

## 2017-12-05 DIAGNOSIS — C50.919 BREAST CANCER (HCC): ICD-10-CM

## 2017-12-05 DIAGNOSIS — S82.402A CLOSED FRACTURE OF LEFT TIBIA AND FIBULA, INITIAL ENCOUNTER: Primary | ICD-10-CM

## 2017-12-05 DIAGNOSIS — R26.89 DECREASED MOBILITY: ICD-10-CM

## 2017-12-05 DIAGNOSIS — S82.402A CLOSED FRACTURE OF LEFT TIBIA AND FIBULA, INITIAL ENCOUNTER: ICD-10-CM

## 2017-12-05 DIAGNOSIS — G95.20 CORD COMPRESSION (HCC): ICD-10-CM

## 2017-12-05 DIAGNOSIS — S82.202A CLOSED FRACTURE OF LEFT TIBIA AND FIBULA, INITIAL ENCOUNTER: Primary | ICD-10-CM

## 2017-12-05 LAB
AMORPH URATE CRY URNS QL MICRO: ABNORMAL /HPF
ANION GAP SERPL CALCULATED.3IONS-SCNC: 15.7 MMOL/L
BACTERIA UR QL AUTO: ABNORMAL /HPF
BH CV LOWER VASCULAR LEFT COMMON FEMORAL AUGMENT: NORMAL
BH CV LOWER VASCULAR LEFT COMMON FEMORAL COMPETENT: NORMAL
BH CV LOWER VASCULAR LEFT COMMON FEMORAL COMPRESS: NORMAL
BH CV LOWER VASCULAR LEFT COMMON FEMORAL PHASIC: NORMAL
BH CV LOWER VASCULAR LEFT COMMON FEMORAL SPONT: NORMAL
BH CV LOWER VASCULAR LEFT DISTAL FEMORAL COMPRESS: NORMAL
BH CV LOWER VASCULAR LEFT GASTRONEMIUS COMPRESS: NORMAL
BH CV LOWER VASCULAR LEFT GREATER SAPH AK COMPRESS: NORMAL
BH CV LOWER VASCULAR LEFT GREATER SAPH BK COMPRESS: NORMAL
BH CV LOWER VASCULAR LEFT MID FEMORAL AUGMENT: NORMAL
BH CV LOWER VASCULAR LEFT MID FEMORAL COMPETENT: NORMAL
BH CV LOWER VASCULAR LEFT MID FEMORAL COMPRESS: NORMAL
BH CV LOWER VASCULAR LEFT MID FEMORAL PHASIC: NORMAL
BH CV LOWER VASCULAR LEFT MID FEMORAL SPONT: NORMAL
BH CV LOWER VASCULAR LEFT PERONEAL COMPRESS: NORMAL
BH CV LOWER VASCULAR LEFT POPLITEAL AUGMENT: NORMAL
BH CV LOWER VASCULAR LEFT POPLITEAL COMPETENT: NORMAL
BH CV LOWER VASCULAR LEFT POPLITEAL COMPRESS: NORMAL
BH CV LOWER VASCULAR LEFT POPLITEAL PHASIC: NORMAL
BH CV LOWER VASCULAR LEFT POPLITEAL SPONT: NORMAL
BH CV LOWER VASCULAR LEFT POSTERIOR TIBIAL COMPRESS: NORMAL
BH CV LOWER VASCULAR LEFT PROXIMAL FEMORAL COMPRESS: NORMAL
BH CV LOWER VASCULAR LEFT SAPHENOFEMORAL JUNCTION AUGMENT: NORMAL
BH CV LOWER VASCULAR LEFT SAPHENOFEMORAL JUNCTION COMPETENT: NORMAL
BH CV LOWER VASCULAR LEFT SAPHENOFEMORAL JUNCTION COMPRESS: NORMAL
BH CV LOWER VASCULAR LEFT SAPHENOFEMORAL JUNCTION PHASIC: NORMAL
BH CV LOWER VASCULAR LEFT SAPHENOFEMORAL JUNCTION SPONT: NORMAL
BH CV LOWER VASCULAR RIGHT COMMON FEMORAL AUGMENT: NORMAL
BH CV LOWER VASCULAR RIGHT COMMON FEMORAL COMPETENT: NORMAL
BH CV LOWER VASCULAR RIGHT COMMON FEMORAL COMPRESS: NORMAL
BH CV LOWER VASCULAR RIGHT COMMON FEMORAL PHASIC: NORMAL
BH CV LOWER VASCULAR RIGHT COMMON FEMORAL SPONT: NORMAL
BH CV LOWER VASCULAR RIGHT DISTAL FEMORAL COMPRESS: NORMAL
BH CV LOWER VASCULAR RIGHT GASTRONEMIUS COMPRESS: NORMAL
BH CV LOWER VASCULAR RIGHT GREATER SAPH AK COMPRESS: NORMAL
BH CV LOWER VASCULAR RIGHT GREATER SAPH BK COMPRESS: NORMAL
BH CV LOWER VASCULAR RIGHT MID FEMORAL AUGMENT: NORMAL
BH CV LOWER VASCULAR RIGHT MID FEMORAL COMPETENT: NORMAL
BH CV LOWER VASCULAR RIGHT MID FEMORAL COMPRESS: NORMAL
BH CV LOWER VASCULAR RIGHT MID FEMORAL PHASIC: NORMAL
BH CV LOWER VASCULAR RIGHT MID FEMORAL SPONT: NORMAL
BH CV LOWER VASCULAR RIGHT PERONEAL COMPRESS: NORMAL
BH CV LOWER VASCULAR RIGHT POPLITEAL AUGMENT: NORMAL
BH CV LOWER VASCULAR RIGHT POPLITEAL COMPETENT: NORMAL
BH CV LOWER VASCULAR RIGHT POPLITEAL COMPRESS: NORMAL
BH CV LOWER VASCULAR RIGHT POPLITEAL PHASIC: NORMAL
BH CV LOWER VASCULAR RIGHT POPLITEAL SPONT: NORMAL
BH CV LOWER VASCULAR RIGHT POSTERIOR TIBIAL COMPRESS: NORMAL
BH CV LOWER VASCULAR RIGHT PROXIMAL FEMORAL COMPRESS: NORMAL
BH CV LOWER VASCULAR RIGHT SAPHENOFEMORAL JUNCTION AUGMENT: NORMAL
BH CV LOWER VASCULAR RIGHT SAPHENOFEMORAL JUNCTION COMPETENT: NORMAL
BH CV LOWER VASCULAR RIGHT SAPHENOFEMORAL JUNCTION COMPRESS: NORMAL
BH CV LOWER VASCULAR RIGHT SAPHENOFEMORAL JUNCTION PHASIC: NORMAL
BH CV LOWER VASCULAR RIGHT SAPHENOFEMORAL JUNCTION SPONT: NORMAL
BILIRUB UR QL STRIP: NEGATIVE
BUN BLD-MCNC: 38 MG/DL (ref 6–20)
BUN/CREAT SERPL: 56.7 (ref 7–25)
CALCIUM SPEC-SCNC: 10.1 MG/DL (ref 8.6–10.5)
CHLORIDE SERPL-SCNC: 94 MMOL/L (ref 98–107)
CLARITY UR: ABNORMAL
CO2 SERPL-SCNC: 22.3 MMOL/L (ref 22–29)
COLOR UR: ABNORMAL
CREAT BLD-MCNC: 0.67 MG/DL (ref 0.57–1)
DEPRECATED RDW RBC AUTO: 48.3 FL (ref 37–54)
ERYTHROCYTE [DISTWIDTH] IN BLOOD BY AUTOMATED COUNT: 14.2 % (ref 11.7–13)
GFR SERPL CREATININE-BSD FRML MDRD: 91 ML/MIN/1.73
GLUCOSE BLD-MCNC: 154 MG/DL (ref 65–99)
GLUCOSE UR STRIP-MCNC: NEGATIVE MG/DL
HCT VFR BLD AUTO: 38.2 % (ref 35.6–45.5)
HGB BLD-MCNC: 12.2 G/DL (ref 11.9–15.5)
HGB UR QL STRIP.AUTO: ABNORMAL
HYALINE CASTS UR QL AUTO: ABNORMAL /LPF
KETONES UR QL STRIP: NEGATIVE
LEUKOCYTE ESTERASE UR QL STRIP.AUTO: ABNORMAL
MAGNESIUM SERPL-MCNC: 2.1 MG/DL (ref 1.6–2.6)
MCH RBC QN AUTO: 29.9 PG (ref 26.9–32)
MCHC RBC AUTO-ENTMCNC: 31.9 G/DL (ref 32.4–36.3)
MCV RBC AUTO: 93.6 FL (ref 80.5–98.2)
NITRITE UR QL STRIP: POSITIVE
PH UR STRIP.AUTO: 6.5 [PH] (ref 5–8)
PLATELET # BLD AUTO: 265 10*3/MM3 (ref 140–500)
PMV BLD AUTO: 10.4 FL (ref 6–12)
POTASSIUM BLD-SCNC: 4.7 MMOL/L (ref 3.5–5.2)
PROT UR QL STRIP: ABNORMAL
RBC # BLD AUTO: 4.08 10*6/MM3 (ref 3.9–5.2)
RBC # UR: ABNORMAL /HPF
REF LAB TEST METHOD: ABNORMAL
SODIUM BLD-SCNC: 132 MMOL/L (ref 136–145)
SP GR UR STRIP: 1.02 (ref 1–1.03)
SQUAMOUS #/AREA URNS HPF: ABNORMAL /HPF
UROBILINOGEN UR QL STRIP: ABNORMAL
WBC NRBC COR # BLD: 11.18 10*3/MM3 (ref 4.5–10.7)
WBC UR QL AUTO: ABNORMAL /HPF

## 2017-12-05 PROCEDURE — 81001 URINALYSIS AUTO W/SCOPE: CPT | Performed by: HOSPITALIST

## 2017-12-05 PROCEDURE — 99203 OFFICE O/P NEW LOW 30 MIN: CPT | Performed by: ORTHOPAEDIC SURGERY

## 2017-12-05 PROCEDURE — 85027 COMPLETE CBC AUTOMATED: CPT | Performed by: HOSPITALIST

## 2017-12-05 PROCEDURE — 93010 ELECTROCARDIOGRAM REPORT: CPT | Performed by: INTERNAL MEDICINE

## 2017-12-05 PROCEDURE — 99283 EMERGENCY DEPT VISIT LOW MDM: CPT

## 2017-12-05 PROCEDURE — S0260 H&P FOR SURGERY: HCPCS | Performed by: ORTHOPAEDIC SURGERY

## 2017-12-05 PROCEDURE — 25010000002 CEFTRIAXONE PER 250 MG: Performed by: HOSPITALIST

## 2017-12-05 PROCEDURE — 93970 EXTREMITY STUDY: CPT

## 2017-12-05 PROCEDURE — 83735 ASSAY OF MAGNESIUM: CPT | Performed by: HOSPITALIST

## 2017-12-05 PROCEDURE — 73590 X-RAY EXAM OF LOWER LEG: CPT | Performed by: ORTHOPAEDIC SURGERY

## 2017-12-05 PROCEDURE — 93005 ELECTROCARDIOGRAM TRACING: CPT | Performed by: HOSPITALIST

## 2017-12-05 PROCEDURE — 25010000002 ONDANSETRON PER 1 MG: Performed by: HOSPITALIST

## 2017-12-05 PROCEDURE — 25010000002 HYDROMORPHONE PER 4 MG: Performed by: HOSPITALIST

## 2017-12-05 PROCEDURE — 80048 BASIC METABOLIC PNL TOTAL CA: CPT | Performed by: HOSPITALIST

## 2017-12-05 PROCEDURE — 87086 URINE CULTURE/COLONY COUNT: CPT | Performed by: HOSPITALIST

## 2017-12-05 PROCEDURE — 63710000001 DEXAMETHASONE PER 0.25 MG: Performed by: HOSPITALIST

## 2017-12-05 RX ORDER — CYCLOBENZAPRINE HCL 10 MG
10 TABLET ORAL 3 TIMES DAILY PRN
Status: DISCONTINUED | OUTPATIENT
Start: 2017-12-05 | End: 2017-12-16 | Stop reason: HOSPADM

## 2017-12-05 RX ORDER — HYDROMORPHONE HYDROCHLORIDE 4 MG/1
4 TABLET ORAL
Status: ON HOLD | COMMUNITY
Start: 2017-11-02 | End: 2017-12-15

## 2017-12-05 RX ORDER — HYDROMORPHONE HYDROCHLORIDE 1 MG/ML
0.5 INJECTION, SOLUTION INTRAMUSCULAR; INTRAVENOUS; SUBCUTANEOUS
Status: DISCONTINUED | OUTPATIENT
Start: 2017-12-05 | End: 2017-12-12

## 2017-12-05 RX ORDER — MESALAMINE 1.2 G/1
1.2 TABLET, DELAYED RELEASE ORAL 2 TIMES DAILY
Status: DISCONTINUED | OUTPATIENT
Start: 2017-12-05 | End: 2017-12-16 | Stop reason: HOSPADM

## 2017-12-05 RX ORDER — ACETAMINOPHEN 325 MG/1
650 TABLET ORAL EVERY 4 HOURS PRN
Status: DISCONTINUED | OUTPATIENT
Start: 2017-12-05 | End: 2017-12-12

## 2017-12-05 RX ORDER — PANTOPRAZOLE SODIUM 40 MG/1
40 TABLET, DELAYED RELEASE ORAL EVERY MORNING
Status: DISCONTINUED | OUTPATIENT
Start: 2017-12-06 | End: 2017-12-16 | Stop reason: HOSPADM

## 2017-12-05 RX ORDER — TRAMADOL HYDROCHLORIDE 50 MG/1
50 TABLET ORAL EVERY 8 HOURS PRN
Status: DISCONTINUED | OUTPATIENT
Start: 2017-12-05 | End: 2017-12-12

## 2017-12-05 RX ORDER — DEXAMETHASONE 4 MG/1
8 TABLET ORAL 2 TIMES DAILY WITH MEALS
Status: DISCONTINUED | OUTPATIENT
Start: 2017-12-05 | End: 2017-12-06

## 2017-12-05 RX ORDER — SODIUM CHLORIDE 9 MG/ML
75 INJECTION, SOLUTION INTRAVENOUS CONTINUOUS
Status: DISCONTINUED | OUTPATIENT
Start: 2017-12-05 | End: 2017-12-08

## 2017-12-05 RX ORDER — ONDANSETRON 4 MG/1
TABLET, ORALLY DISINTEGRATING ORAL
Refills: 0 | COMMUNITY
Start: 2017-10-14 | End: 2018-03-12 | Stop reason: SDUPTHER

## 2017-12-05 RX ORDER — FENTANYL 50 UG/H
1 PATCH TRANSDERMAL
Status: ON HOLD | COMMUNITY
Start: 2017-11-02 | End: 2017-12-15

## 2017-12-05 RX ORDER — GABAPENTIN 300 MG/1
300 CAPSULE ORAL EVERY 12 HOURS SCHEDULED
Status: DISCONTINUED | OUTPATIENT
Start: 2017-12-05 | End: 2017-12-16 | Stop reason: HOSPADM

## 2017-12-05 RX ORDER — ONDANSETRON 4 MG/1
4 TABLET, FILM COATED ORAL EVERY 6 HOURS PRN
Status: DISCONTINUED | OUTPATIENT
Start: 2017-12-05 | End: 2017-12-13

## 2017-12-05 RX ORDER — ONDANSETRON 2 MG/ML
4 INJECTION INTRAMUSCULAR; INTRAVENOUS EVERY 6 HOURS PRN
Status: DISCONTINUED | OUTPATIENT
Start: 2017-12-05 | End: 2017-12-13

## 2017-12-05 RX ORDER — ONDANSETRON 4 MG/1
4 TABLET, ORALLY DISINTEGRATING ORAL EVERY 6 HOURS PRN
Status: DISCONTINUED | OUTPATIENT
Start: 2017-12-05 | End: 2017-12-13

## 2017-12-05 RX ORDER — LACTULOSE 10 G/15ML
20 SOLUTION ORAL NIGHTLY
Status: ON HOLD | COMMUNITY
End: 2017-12-15

## 2017-12-05 RX ORDER — ATORVASTATIN CALCIUM 10 MG/1
10 TABLET, FILM COATED ORAL DAILY
Status: DISCONTINUED | OUTPATIENT
Start: 2017-12-05 | End: 2017-12-16 | Stop reason: HOSPADM

## 2017-12-05 RX ORDER — POLYETHYLENE GLYCOL 3350 17 G/17G
17 POWDER, FOR SOLUTION ORAL DAILY
Status: DISCONTINUED | OUTPATIENT
Start: 2017-12-05 | End: 2017-12-09

## 2017-12-05 RX ORDER — FENTANYL 50 UG/H
1 PATCH TRANSDERMAL
Status: DISCONTINUED | OUTPATIENT
Start: 2017-12-05 | End: 2017-12-16 | Stop reason: HOSPADM

## 2017-12-05 RX ORDER — NALOXONE HCL 0.4 MG/ML
0.4 VIAL (ML) INJECTION
Status: DISCONTINUED | OUTPATIENT
Start: 2017-12-05 | End: 2017-12-12

## 2017-12-05 RX ORDER — ACETAMINOPHEN 500 MG
500 TABLET ORAL EVERY 6 HOURS PRN
COMMUNITY

## 2017-12-05 RX ORDER — CEFTRIAXONE SODIUM 1 G/50ML
1 INJECTION, SOLUTION INTRAVENOUS EVERY 24 HOURS
Status: COMPLETED | OUTPATIENT
Start: 2017-12-05 | End: 2017-12-07

## 2017-12-05 RX ORDER — BISACODYL 10 MG
10 SUPPOSITORY, RECTAL RECTAL DAILY PRN
Status: DISCONTINUED | OUTPATIENT
Start: 2017-12-05 | End: 2017-12-13

## 2017-12-05 RX ORDER — BISACODYL 5 MG/1
5 TABLET, DELAYED RELEASE ORAL DAILY PRN
Status: DISCONTINUED | OUTPATIENT
Start: 2017-12-05 | End: 2017-12-16 | Stop reason: HOSPADM

## 2017-12-05 RX ORDER — CEFAZOLIN SODIUM 2 G/100ML
2 INJECTION, SOLUTION INTRAVENOUS ONCE
Status: COMPLETED | OUTPATIENT
Start: 2017-12-06 | End: 2017-12-06

## 2017-12-05 RX ORDER — LACTULOSE 10 G/15ML
20 SOLUTION ORAL NIGHTLY
Status: DISCONTINUED | OUTPATIENT
Start: 2017-12-05 | End: 2017-12-09

## 2017-12-05 RX ORDER — POLYETHYLENE GLYCOL 3350 17 G/17G
17 POWDER, FOR SOLUTION ORAL DAILY
COMMUNITY
End: 2018-07-11

## 2017-12-05 RX ORDER — SODIUM CHLORIDE 0.9 % (FLUSH) 0.9 %
1-10 SYRINGE (ML) INJECTION AS NEEDED
Status: DISCONTINUED | OUTPATIENT
Start: 2017-12-05 | End: 2017-12-12

## 2017-12-05 RX ORDER — LETROZOLE 2.5 MG/1
2.5 TABLET, FILM COATED ORAL DAILY
Status: DISCONTINUED | OUTPATIENT
Start: 2017-12-05 | End: 2017-12-06

## 2017-12-05 RX ADMIN — HYDROMORPHONE HYDROCHLORIDE 0.5 MG: 10 INJECTION INTRAMUSCULAR; INTRAVENOUS; SUBCUTANEOUS at 11:02

## 2017-12-05 RX ADMIN — POLYETHYLENE GLYCOL 3350 17 G: 17 POWDER, FOR SOLUTION ORAL at 20:00

## 2017-12-05 RX ADMIN — FENTANYL 1 PATCH: 50 PATCH, EXTENDED RELEASE TRANSDERMAL at 20:45

## 2017-12-05 RX ADMIN — ONDANSETRON 4 MG: 2 INJECTION INTRAMUSCULAR; INTRAVENOUS at 11:02

## 2017-12-05 RX ADMIN — SODIUM CHLORIDE 75 ML/HR: 9 INJECTION, SOLUTION INTRAVENOUS at 14:07

## 2017-12-05 RX ADMIN — GABAPENTIN 300 MG: 300 CAPSULE ORAL at 21:00

## 2017-12-05 RX ADMIN — LETROZOLE 2.5 MG: 2.5 TABLET ORAL at 20:00

## 2017-12-05 RX ADMIN — DEXAMETHASONE 8 MG: 4 TABLET ORAL at 20:00

## 2017-12-05 RX ADMIN — ATORVASTATIN CALCIUM 10 MG: 10 TABLET, FILM COATED ORAL at 20:00

## 2017-12-05 RX ADMIN — MESALAMINE 1.2 G: 1.2 TABLET, DELAYED RELEASE ORAL at 20:00

## 2017-12-05 RX ADMIN — HYDROMORPHONE HYDROCHLORIDE 0.5 MG: 10 INJECTION INTRAMUSCULAR; INTRAVENOUS; SUBCUTANEOUS at 16:43

## 2017-12-05 RX ADMIN — CEFTRIAXONE SODIUM 1 G: 1 INJECTION, SOLUTION INTRAVENOUS at 21:00

## 2017-12-05 NOTE — PROGRESS NOTES
"Patient:  Martha Davey is a 57 y.o. female    Chief Complaint/ Reason for Visit:    Chief Complaint   Patient presents with   • Left Leg - Establish Care, Pain, Leg Injury       HPI:  This pleasant lady is brought in by an ambulance service, accompanied by her , complaining of pain and deformity in her left leg since an injury she sustained at the nursing facility where she is residing, recovering from multiple other medical problems.  The patient and her  tell me that on Saturday, she was being transferred on some sort of a slider board when her left foot became caught on the edge of the bed while she was moving.  She experienced immediate acute, abrupt, sharp pain and they heard a \"pop\".  They tell me that x-rays were ordered that revealed a fracture.  Yesterday we received a call here at the office requesting that the patient be worked in for evaluation of this fracture some time this week.  Seeing that she had multiple medical problems, I worked her in first thing this morning.  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  Additionally, she has a history of breast cancer with reported history of metastatic disease to the spine.  She recently had a pulmonary embolism, and was treated for this in October.  Any movement of her left lower extremity causes abrupt sharp pain in her left leg.  It tends to be stabbing in nature.  The thing that makes her feel best is for the leg to be left still.  The patient's  tells me that they have been keeping ice on it since the injury.  The patient does not presently have any acute calf pain, chest pain, or shortness of breath.      PMH:    Past Medical History:   Diagnosis Date   • Acute pulmonary embolism, cancer-related 10/2017   • Allergic rhinitis    • Arthritis     Right knee; left shoulder   • Cancer 05/2010    Right breast   • Cancer 10/2017 "    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   • Charcot-Saloni-Tooth disease    • CPAP (continuous positive airway pressure) dependence    • GERD (gastroesophageal reflux disease)    • H/O Colon polyps    • H/O Uterine fibroid    • Heart murmur    • Hyperlipidemia    • Hypertension        PSH:    Past Surgical History:   Procedure Laterality Date   • BLADDER SURGERY  2004    Bladder lift   • BREAST SURGERY Right 2010    Mastectomy   •  SECTION  ,    • CHOLECYSTECTOMY     • COLONOSCOPY     • HERNIA REPAIR  2015    [facility]   • HYSTERECTOMY      Partial   • KNEE SURGERY Right    • LEG SURGERY Left     Broken       Social Hx:    Social History     Social History   • Marital status:      Spouse name: N/A   • Number of children: 3   • Years of education: N/A     Occupational History   •  Retired     Social History Main Topics   • Smoking status: Never Smoker   • Smokeless tobacco: Never Used   • Alcohol use Yes      Comment: social   • Drug use: No   • Sexual activity: Defer     Other Topics Concern   • Not on file     Social History Narrative       Family Hx:    Family History   Problem Relation Age of Onset   • Heart disease Mother    • Hyperlipidemia Mother    • Hypertension Mother    • Diabetes Father    • Charcot-Saloni-Tooth disease Father    • Heart disease Father    • Hypertension Father    • Heart failure Father    • Diabetes Sister    • Heart disease Sister    • Heart disease Maternal Aunt    • Scoliosis Maternal Aunt    • Heart disease Maternal Uncle    • Diabetes Maternal Grandmother    • Heart disease Maternal Grandmother    • Hypertension Maternal Grandmother    • Uterine cancer Maternal Grandmother    • Heart disease Maternal Grandfather        Meds:  No current facility-administered medications for this visit.   No current outpatient prescriptions on file.    Facility-Administered Medications Ordered in Other Visits:   •  acetaminophen (TYLENOL) tablet 650 mg, 650 mg, Oral, Q4H PRN,  "Romero Bella MD  •  bisacodyl (DULCOLAX) EC tablet 5 mg, 5 mg, Oral, Daily PRN, Romero Bella MD  •  bisacodyl (DULCOLAX) suppository 10 mg, 10 mg, Rectal, Daily PRN, Romero Bella MD  •  [START ON 12/6/2017] ceFAZolin in dextrose (ANCEF) IVPB solution 2 g, 2 g, Intravenous, Once, Nuzhat Easton, APRN  •  HYDROmorphone (DILAUDID) injection 0.5 mg, 0.5 mg, Intravenous, Q2H PRN, 0.5 mg at 12/05/17 1102 **AND** naloxone (NARCAN) injection 0.4 mg, 0.4 mg, Intravenous, Q5 Min PRN, Romero Bella MD  •  ondansetron (ZOFRAN) tablet 4 mg, 4 mg, Oral, Q6H PRN **OR** ondansetron ODT (ZOFRAN-ODT) disintegrating tablet 4 mg, 4 mg, Oral, Q6H PRN **OR** ondansetron (ZOFRAN) injection 4 mg, 4 mg, Intravenous, Q6H PRN, Romero Bella MD, 4 mg at 12/05/17 1102  •  sodium chloride 0.9 % flush 1-10 mL, 1-10 mL, Intravenous, PRN, Romero Bella MD  •  sodium chloride 0.9 % infusion, 75 mL/hr, Intravenous, Continuous, Romero Bella MD, Last Rate: 75 mL/hr at 12/05/17 1407, 75 mL/hr at 12/05/17 1407    Allergies:    Allergies   Allergen Reactions   • Hydrocodone Nausea Only   • Morphine And Related        ROS:  Review of Systems    Vitals:    12/05/17 0842   Temp: 96.9 °F (36.1 °C)   TempSrc: Temporal Artery    Weight: 86.6 kg (191 lb)   Height: 62 cm (24.41\")       Physical Exam    The patient is awake, alert, and oriented ×3.  The patient is in no acute distress, but is understandably uncomfortable..  Breathing is regular and unlabored with a respiratory rate of 14/m.  Extraocular movements and pupillary responses are symmetrically intact. Sclerae are anicteric.   Hearing is within normal limits.  Speech is within normal limits.  There is no jugular venous distention.    Left lower extremity: The patient does have relative external rotation of her left foot compared to her right.  Her left knee is stable and nontender.  She has moderate swelling throughout the left leg, but her left calf is quite " soft.  I do not feel a venous cord.  I do feel palpable, regular dorsalis pedis and posterior tibial pulses in the left lower extremity and these feel equivalent in amplitude to those in her uninjured right lower extremity.    I did not see any acute lacerations or abrasions of the skin of the left lower extremity.        Radiology: X-rays: 3 total views of the patient's left tibia were ordered and reviewed today including AP, lateral, and a lateral of the lower portion of her tibia more closely focused on her fracture.  The patient has displaced spiral fracture of the left tibial shaft traversing the junction between the middle and distal thirds.  The patient has an associated largely spiral fracture of her left fibula as well.  Though there was a report of x-rays from the nursing facility having been obtained, neither a written report nor images were available at the time of my evaluation for comparison.          Assessment:  1. Closed displaced spiral fracture of shaft of left tibia, initial encounter    - XR Tibia Fibula 2 View Left    2. Closed fracture of left tibia and fibula, initial encounter            Plan: I discussed the circumstances with the patient and her , and explained that she certainly had a surgical problem.  However, given all of her medical problems otherwise, I think it is in the patient's best interest that she go through the emergency Department over at [facility] and be admitted to the medical service.  I have explained to them that she will need a surgical procedure for this fracture, but they also understand that preoperative medical evaluation is important as well.    I have explained that our office is on call and that someone in our office or with him our office has an ongoing cooperative relationship will perform the surgery.  They have voiced understanding.    With multiple assistants, I carefully placed the patient in a fracture boot as she came in with no splint or other  stabilization.  She actually tolerated this well, experiencing expected discomfort but not excessive discomfort.  She did say she felt better once she was in the boot.    The patient is being transferred by the ambulance service the brought her into the office over to the emergency department at [facility].       Orders Placed This Encounter   Procedures   • XR Tibia Fibula 2 View Left     Order Specific Question:   Reason for Exam:     Answer:   iov lt. tib-fib     Order Specific Question:   Patient Pregnant     Answer:   No

## 2017-12-06 ENCOUNTER — ANESTHESIA (OUTPATIENT)
Dept: PERIOP | Facility: HOSPITAL | Age: 57
End: 2017-12-06

## 2017-12-06 ENCOUNTER — ANESTHESIA EVENT (OUTPATIENT)
Dept: PERIOP | Facility: HOSPITAL | Age: 57
End: 2017-12-06

## 2017-12-06 ENCOUNTER — APPOINTMENT (OUTPATIENT)
Dept: GENERAL RADIOLOGY | Facility: HOSPITAL | Age: 57
End: 2017-12-06

## 2017-12-06 LAB
ANION GAP SERPL CALCULATED.3IONS-SCNC: 14.5 MMOL/L
BUN BLD-MCNC: 36 MG/DL (ref 6–20)
BUN/CREAT SERPL: 53.7 (ref 7–25)
CALCIUM SPEC-SCNC: 9.5 MG/DL (ref 8.6–10.5)
CANCER AG15-3 SERPL-ACNC: 17 U/ML
CHLORIDE SERPL-SCNC: 95 MMOL/L (ref 98–107)
CO2 SERPL-SCNC: 22.5 MMOL/L (ref 22–29)
CREAT BLD-MCNC: 0.67 MG/DL (ref 0.57–1)
DEPRECATED RDW RBC AUTO: 49.1 FL (ref 37–54)
ERYTHROCYTE [DISTWIDTH] IN BLOOD BY AUTOMATED COUNT: 14.4 % (ref 11.7–13)
GFR SERPL CREATININE-BSD FRML MDRD: 91 ML/MIN/1.73
GLUCOSE BLD-MCNC: 168 MG/DL (ref 65–99)
HCT VFR BLD AUTO: 36.7 % (ref 35.6–45.5)
HGB BLD-MCNC: 11.6 G/DL (ref 11.9–15.5)
MAGNESIUM SERPL-MCNC: 2 MG/DL (ref 1.6–2.6)
MCH RBC QN AUTO: 29.9 PG (ref 26.9–32)
MCHC RBC AUTO-ENTMCNC: 31.6 G/DL (ref 32.4–36.3)
MCV RBC AUTO: 94.6 FL (ref 80.5–98.2)
PHOSPHATE SERPL-MCNC: 3.6 MG/DL (ref 2.5–4.5)
PLATELET # BLD AUTO: 266 10*3/MM3 (ref 140–500)
PMV BLD AUTO: 10.4 FL (ref 6–12)
POTASSIUM BLD-SCNC: 4.8 MMOL/L (ref 3.5–5.2)
RBC # BLD AUTO: 3.88 10*6/MM3 (ref 3.9–5.2)
SODIUM BLD-SCNC: 132 MMOL/L (ref 136–145)
WBC NRBC COR # BLD: 8.72 10*3/MM3 (ref 4.5–10.7)

## 2017-12-06 PROCEDURE — 36415 COLL VENOUS BLD VENIPUNCTURE: CPT | Performed by: HOSPITALIST

## 2017-12-06 PROCEDURE — 83735 ASSAY OF MAGNESIUM: CPT | Performed by: HOSPITALIST

## 2017-12-06 PROCEDURE — 0QSK06Z REPOSITION LEFT FIBULA WITH INTRAMEDULLARY INTERNAL FIXATION DEVICE, OPEN APPROACH: ICD-10-PCS | Performed by: ORTHOPAEDIC SURGERY

## 2017-12-06 PROCEDURE — 94799 UNLISTED PULMONARY SVC/PX: CPT

## 2017-12-06 PROCEDURE — 27759 TREATMENT OF TIBIA FRACTURE: CPT | Performed by: ORTHOPAEDIC SURGERY

## 2017-12-06 PROCEDURE — 25010000002 FENTANYL CITRATE (PF) 100 MCG/2ML SOLUTION: Performed by: ANESTHESIOLOGY

## 2017-12-06 PROCEDURE — C1713 ANCHOR/SCREW BN/BN,TIS/BN: HCPCS | Performed by: ORTHOPAEDIC SURGERY

## 2017-12-06 PROCEDURE — 25010000002 PROPOFOL 10 MG/ML EMULSION: Performed by: NURSE ANESTHETIST, CERTIFIED REGISTERED

## 2017-12-06 PROCEDURE — 85027 COMPLETE CBC AUTOMATED: CPT | Performed by: HOSPITALIST

## 2017-12-06 PROCEDURE — 99223 1ST HOSP IP/OBS HIGH 75: CPT | Performed by: INTERNAL MEDICINE

## 2017-12-06 PROCEDURE — 25010000002 PHENYLEPHRINE PER 1 ML: Performed by: NURSE ANESTHETIST, CERTIFIED REGISTERED

## 2017-12-06 PROCEDURE — 73590 X-RAY EXAM OF LOWER LEG: CPT

## 2017-12-06 PROCEDURE — 88307 TISSUE EXAM BY PATHOLOGIST: CPT | Performed by: ORTHOPAEDIC SURGERY

## 2017-12-06 PROCEDURE — 25010000002 MEPIVACAINE PF 2 % SOLUTION 20 ML VIAL: Performed by: ANESTHESIOLOGY

## 2017-12-06 PROCEDURE — 25010000002 ONDANSETRON PER 1 MG: Performed by: NURSE ANESTHETIST, CERTIFIED REGISTERED

## 2017-12-06 PROCEDURE — 88311 DECALCIFY TISSUE: CPT | Performed by: ORTHOPAEDIC SURGERY

## 2017-12-06 PROCEDURE — 86300 IMMUNOASSAY TUMOR CA 15-3: CPT | Performed by: INTERNAL MEDICINE

## 2017-12-06 PROCEDURE — 76000 FLUOROSCOPY <1 HR PHYS/QHP: CPT

## 2017-12-06 PROCEDURE — 25010000003 CEFAZOLIN IN DEXTROSE 2-4 GM/100ML-% SOLUTION: Performed by: NURSE PRACTITIONER

## 2017-12-06 PROCEDURE — 25010000002 CEFTRIAXONE PER 250 MG: Performed by: HOSPITALIST

## 2017-12-06 PROCEDURE — 84100 ASSAY OF PHOSPHORUS: CPT | Performed by: HOSPITALIST

## 2017-12-06 PROCEDURE — 63710000001 DEXAMETHASONE PER 0.25 MG: Performed by: HOSPITALIST

## 2017-12-06 PROCEDURE — 0QSH06Z REPOSITION LEFT TIBIA WITH INTRAMEDULLARY INTERNAL FIXATION DEVICE, OPEN APPROACH: ICD-10-PCS | Performed by: ORTHOPAEDIC SURGERY

## 2017-12-06 PROCEDURE — 80048 BASIC METABOLIC PNL TOTAL CA: CPT | Performed by: HOSPITALIST

## 2017-12-06 PROCEDURE — 25010000002 ROPIVACAINE PER 1 MG: Performed by: ANESTHESIOLOGY

## 2017-12-06 PROCEDURE — 25010000002 MIDAZOLAM PER 1 MG: Performed by: ANESTHESIOLOGY

## 2017-12-06 DEVICE — IMPLANTABLE DEVICE: Type: IMPLANTABLE DEVICE | Site: TIBIA | Status: FUNCTIONAL

## 2017-12-06 RX ORDER — ROCURONIUM BROMIDE 10 MG/ML
INJECTION, SOLUTION INTRAVENOUS AS NEEDED
Status: DISCONTINUED | OUTPATIENT
Start: 2017-12-06 | End: 2017-12-06 | Stop reason: SURG

## 2017-12-06 RX ORDER — HYDRALAZINE HYDROCHLORIDE 20 MG/ML
5 INJECTION INTRAMUSCULAR; INTRAVENOUS
Status: DISCONTINUED | OUTPATIENT
Start: 2017-12-06 | End: 2017-12-06 | Stop reason: HOSPADM

## 2017-12-06 RX ORDER — PROMETHAZINE HYDROCHLORIDE 25 MG/ML
12.5 INJECTION, SOLUTION INTRAMUSCULAR; INTRAVENOUS ONCE AS NEEDED
Status: DISCONTINUED | OUTPATIENT
Start: 2017-12-06 | End: 2017-12-06 | Stop reason: HOSPADM

## 2017-12-06 RX ORDER — PROMETHAZINE HYDROCHLORIDE 25 MG/1
25 SUPPOSITORY RECTAL ONCE AS NEEDED
Status: DISCONTINUED | OUTPATIENT
Start: 2017-12-06 | End: 2017-12-06 | Stop reason: HOSPADM

## 2017-12-06 RX ORDER — ROPIVACAINE HYDROCHLORIDE 5 MG/ML
INJECTION, SOLUTION EPIDURAL; INFILTRATION; PERINEURAL AS NEEDED
Status: DISCONTINUED | OUTPATIENT
Start: 2017-12-06 | End: 2017-12-06 | Stop reason: SURG

## 2017-12-06 RX ORDER — FENTANYL CITRATE 50 UG/ML
50 INJECTION, SOLUTION INTRAMUSCULAR; INTRAVENOUS
Status: DISCONTINUED | OUTPATIENT
Start: 2017-12-06 | End: 2017-12-06

## 2017-12-06 RX ORDER — LIDOCAINE HYDROCHLORIDE 20 MG/ML
INJECTION, SOLUTION INFILTRATION; PERINEURAL AS NEEDED
Status: DISCONTINUED | OUTPATIENT
Start: 2017-12-06 | End: 2017-12-06 | Stop reason: SURG

## 2017-12-06 RX ORDER — FLUMAZENIL 0.1 MG/ML
0.2 INJECTION INTRAVENOUS AS NEEDED
Status: DISCONTINUED | OUTPATIENT
Start: 2017-12-06 | End: 2017-12-06 | Stop reason: HOSPADM

## 2017-12-06 RX ORDER — DIPHENHYDRAMINE HYDROCHLORIDE 50 MG/ML
12.5 INJECTION INTRAMUSCULAR; INTRAVENOUS
Status: DISCONTINUED | OUTPATIENT
Start: 2017-12-06 | End: 2017-12-06 | Stop reason: HOSPADM

## 2017-12-06 RX ORDER — PROPOFOL 10 MG/ML
VIAL (ML) INTRAVENOUS AS NEEDED
Status: DISCONTINUED | OUTPATIENT
Start: 2017-12-06 | End: 2017-12-06 | Stop reason: SURG

## 2017-12-06 RX ORDER — LABETALOL HYDROCHLORIDE 5 MG/ML
5 INJECTION, SOLUTION INTRAVENOUS
Status: DISCONTINUED | OUTPATIENT
Start: 2017-12-06 | End: 2017-12-06 | Stop reason: HOSPADM

## 2017-12-06 RX ORDER — SODIUM CHLORIDE, SODIUM LACTATE, POTASSIUM CHLORIDE, CALCIUM CHLORIDE 600; 310; 30; 20 MG/100ML; MG/100ML; MG/100ML; MG/100ML
9 INJECTION, SOLUTION INTRAVENOUS CONTINUOUS
Status: DISCONTINUED | OUTPATIENT
Start: 2017-12-06 | End: 2017-12-12

## 2017-12-06 RX ORDER — MIDAZOLAM HYDROCHLORIDE 1 MG/ML
2 INJECTION INTRAMUSCULAR; INTRAVENOUS
Status: DISCONTINUED | OUTPATIENT
Start: 2017-12-06 | End: 2017-12-06

## 2017-12-06 RX ORDER — PROMETHAZINE HYDROCHLORIDE 25 MG/1
25 TABLET ORAL ONCE AS NEEDED
Status: DISCONTINUED | OUTPATIENT
Start: 2017-12-06 | End: 2017-12-06 | Stop reason: HOSPADM

## 2017-12-06 RX ORDER — EPHEDRINE SULFATE 50 MG/ML
5 INJECTION, SOLUTION INTRAVENOUS ONCE AS NEEDED
Status: DISCONTINUED | OUTPATIENT
Start: 2017-12-06 | End: 2017-12-06 | Stop reason: HOSPADM

## 2017-12-06 RX ORDER — FAMOTIDINE 10 MG/ML
20 INJECTION, SOLUTION INTRAVENOUS ONCE
Status: COMPLETED | OUTPATIENT
Start: 2017-12-06 | End: 2017-12-06

## 2017-12-06 RX ORDER — FENTANYL CITRATE 50 UG/ML
50 INJECTION, SOLUTION INTRAMUSCULAR; INTRAVENOUS
Status: DISCONTINUED | OUTPATIENT
Start: 2017-12-06 | End: 2017-12-06 | Stop reason: HOSPADM

## 2017-12-06 RX ORDER — ONDANSETRON 2 MG/ML
INJECTION INTRAMUSCULAR; INTRAVENOUS AS NEEDED
Status: DISCONTINUED | OUTPATIENT
Start: 2017-12-06 | End: 2017-12-06 | Stop reason: SURG

## 2017-12-06 RX ORDER — SODIUM CHLORIDE 0.9 % (FLUSH) 0.9 %
1-10 SYRINGE (ML) INJECTION AS NEEDED
Status: DISCONTINUED | OUTPATIENT
Start: 2017-12-06 | End: 2017-12-06

## 2017-12-06 RX ORDER — PROMETHAZINE HYDROCHLORIDE 25 MG/1
12.5 TABLET ORAL ONCE AS NEEDED
Status: DISCONTINUED | OUTPATIENT
Start: 2017-12-06 | End: 2017-12-06 | Stop reason: HOSPADM

## 2017-12-06 RX ORDER — ONDANSETRON 2 MG/ML
4 INJECTION INTRAMUSCULAR; INTRAVENOUS ONCE AS NEEDED
Status: DISCONTINUED | OUTPATIENT
Start: 2017-12-06 | End: 2017-12-06 | Stop reason: HOSPADM

## 2017-12-06 RX ORDER — EPHEDRINE SULFATE 50 MG/ML
INJECTION, SOLUTION INTRAVENOUS AS NEEDED
Status: DISCONTINUED | OUTPATIENT
Start: 2017-12-06 | End: 2017-12-06 | Stop reason: SURG

## 2017-12-06 RX ORDER — CEFAZOLIN SODIUM 2 G/100ML
2 INJECTION, SOLUTION INTRAVENOUS EVERY 8 HOURS
Status: COMPLETED | OUTPATIENT
Start: 2017-12-06 | End: 2017-12-07

## 2017-12-06 RX ORDER — NALOXONE HCL 0.4 MG/ML
0.2 VIAL (ML) INJECTION AS NEEDED
Status: DISCONTINUED | OUTPATIENT
Start: 2017-12-06 | End: 2017-12-06 | Stop reason: HOSPADM

## 2017-12-06 RX ORDER — MIDAZOLAM HYDROCHLORIDE 1 MG/ML
1 INJECTION INTRAMUSCULAR; INTRAVENOUS
Status: DISCONTINUED | OUTPATIENT
Start: 2017-12-06 | End: 2017-12-06

## 2017-12-06 RX ORDER — OXYCODONE AND ACETAMINOPHEN 7.5; 325 MG/1; MG/1
1 TABLET ORAL ONCE AS NEEDED
Status: DISCONTINUED | OUTPATIENT
Start: 2017-12-06 | End: 2017-12-06 | Stop reason: HOSPADM

## 2017-12-06 RX ORDER — HYDROCODONE BITARTRATE AND ACETAMINOPHEN 7.5; 325 MG/1; MG/1
1 TABLET ORAL ONCE AS NEEDED
Status: DISCONTINUED | OUTPATIENT
Start: 2017-12-06 | End: 2017-12-06 | Stop reason: HOSPADM

## 2017-12-06 RX ORDER — HYDROMORPHONE HCL 110MG/55ML
0.5 PATIENT CONTROLLED ANALGESIA SYRINGE INTRAVENOUS
Status: DISCONTINUED | OUTPATIENT
Start: 2017-12-06 | End: 2017-12-06 | Stop reason: HOSPADM

## 2017-12-06 RX ORDER — DEXAMETHASONE 4 MG/1
4 TABLET ORAL 2 TIMES DAILY WITH MEALS
Status: DISCONTINUED | OUTPATIENT
Start: 2017-12-07 | End: 2017-12-08

## 2017-12-06 RX ADMIN — MEPIVACAINE HYDROCHLORIDE 10 ML: 20 INJECTION, SOLUTION EPIDURAL; INFILTRATION at 10:55

## 2017-12-06 RX ADMIN — LETROZOLE 2.5 MG: 2.5 TABLET ORAL at 18:33

## 2017-12-06 RX ADMIN — SODIUM CHLORIDE, POTASSIUM CHLORIDE, SODIUM LACTATE AND CALCIUM CHLORIDE: 600; 310; 30; 20 INJECTION, SOLUTION INTRAVENOUS at 13:22

## 2017-12-06 RX ADMIN — DEXAMETHASONE 8 MG: 4 TABLET ORAL at 18:32

## 2017-12-06 RX ADMIN — MEPIVACAINE HYDROCHLORIDE 10 ML: 20 INJECTION, SOLUTION EPIDURAL; INFILTRATION at 11:03

## 2017-12-06 RX ADMIN — ROPIVACAINE HYDROCHLORIDE 15 ML: 5 INJECTION, SOLUTION EPIDURAL; INFILTRATION; PERINEURAL at 10:55

## 2017-12-06 RX ADMIN — PHENYLEPHRINE HYDROCHLORIDE 100 MCG: 10 INJECTION INTRAVENOUS at 12:33

## 2017-12-06 RX ADMIN — PROPOFOL 200 MG: 10 INJECTION, EMULSION INTRAVENOUS at 12:11

## 2017-12-06 RX ADMIN — EPHEDRINE SULFATE 10 MG: 50 INJECTION INTRAMUSCULAR; INTRAVENOUS; SUBCUTANEOUS at 13:10

## 2017-12-06 RX ADMIN — PANTOPRAZOLE SODIUM 40 MG: 40 TABLET, DELAYED RELEASE ORAL at 06:53

## 2017-12-06 RX ADMIN — FAMOTIDINE 20 MG: 10 INJECTION, SOLUTION INTRAVENOUS at 10:42

## 2017-12-06 RX ADMIN — ROPIVACAINE HYDROCHLORIDE 15 ML: 5 INJECTION, SOLUTION EPIDURAL; INFILTRATION; PERINEURAL at 11:03

## 2017-12-06 RX ADMIN — PHENYLEPHRINE HYDROCHLORIDE 100 MCG: 10 INJECTION INTRAVENOUS at 12:45

## 2017-12-06 RX ADMIN — CEFAZOLIN SODIUM 2 G: 2 INJECTION, SOLUTION INTRAVENOUS at 21:00

## 2017-12-06 RX ADMIN — EPHEDRINE SULFATE 10 MG: 50 INJECTION INTRAMUSCULAR; INTRAVENOUS; SUBCUTANEOUS at 13:57

## 2017-12-06 RX ADMIN — ATORVASTATIN CALCIUM 10 MG: 10 TABLET, FILM COATED ORAL at 08:48

## 2017-12-06 RX ADMIN — GABAPENTIN 300 MG: 300 CAPSULE ORAL at 21:00

## 2017-12-06 RX ADMIN — CEFTRIAXONE SODIUM 1 G: 1 INJECTION, SOLUTION INTRAVENOUS at 20:00

## 2017-12-06 RX ADMIN — DEXAMETHASONE 8 MG: 4 TABLET ORAL at 08:49

## 2017-12-06 RX ADMIN — PHENYLEPHRINE HYDROCHLORIDE 100 MCG: 10 INJECTION INTRAVENOUS at 12:20

## 2017-12-06 RX ADMIN — GABAPENTIN 300 MG: 300 CAPSULE ORAL at 08:56

## 2017-12-06 RX ADMIN — LIDOCAINE HYDROCHLORIDE 60 MG: 20 INJECTION, SOLUTION INFILTRATION; PERINEURAL at 12:11

## 2017-12-06 RX ADMIN — MESALAMINE 1.2 G: 1.2 TABLET, DELAYED RELEASE ORAL at 18:32

## 2017-12-06 RX ADMIN — SODIUM CHLORIDE, POTASSIUM CHLORIDE, SODIUM LACTATE AND CALCIUM CHLORIDE 9 ML/HR: 600; 310; 30; 20 INJECTION, SOLUTION INTRAVENOUS at 09:50

## 2017-12-06 RX ADMIN — FENTANYL CITRATE 50 MCG: 50 INJECTION, SOLUTION INTRAMUSCULAR; INTRAVENOUS at 10:57

## 2017-12-06 RX ADMIN — ONDANSETRON 4 MG: 2 INJECTION INTRAMUSCULAR; INTRAVENOUS at 13:57

## 2017-12-06 RX ADMIN — SODIUM CHLORIDE, POTASSIUM CHLORIDE, SODIUM LACTATE AND CALCIUM CHLORIDE: 600; 310; 30; 20 INJECTION, SOLUTION INTRAVENOUS at 12:01

## 2017-12-06 RX ADMIN — FENTANYL CITRATE 50 MCG: 50 INJECTION, SOLUTION INTRAMUSCULAR; INTRAVENOUS at 10:42

## 2017-12-06 RX ADMIN — PHENYLEPHRINE HYDROCHLORIDE 100 MCG: 10 INJECTION INTRAVENOUS at 12:57

## 2017-12-06 RX ADMIN — ROCURONIUM BROMIDE 40 MG: 10 INJECTION INTRAVENOUS at 12:11

## 2017-12-06 RX ADMIN — CEFAZOLIN SODIUM 2 G: 2 INJECTION, SOLUTION INTRAVENOUS at 12:05

## 2017-12-06 RX ADMIN — MIDAZOLAM 2 MG: 1 INJECTION INTRAMUSCULAR; INTRAVENOUS at 10:42

## 2017-12-06 RX ADMIN — TRAMADOL HYDROCHLORIDE 50 MG: 50 TABLET ORAL at 23:42

## 2017-12-06 NOTE — ANESTHESIA PROCEDURE NOTES
Peripheral Block    Patient location during procedure: holding area  Start time: 12/6/2017 10:57 AM  Stop time: 12/6/2017 11:04 AM  Reason for block: at surgeon's request and post-op pain management  Performed by  Anesthesiologist: CARLINE DAVIS  Preanesthetic Checklist  Completed: patient identified, site marked, surgical consent, pre-op evaluation, timeout performed, IV checked, risks and benefits discussed and monitors and equipment checked  Prep:  Pt Position: right lateral decubitus  Sterile barriers:gloves and sterile barriers  Prep: ChloraPrep  Patient monitoring: blood pressure monitoring, continuous pulse oximetry and EKG  Procedure  Sedation:yes  Performed under: local infiltration  Guidance:ultrasound guided  ULTRASOUND INTERPRETATION. Using ultrasound guidance a gauge needle was placed in close proximity to the sciatic nerve, at which point, under ultrasound guidance anesthetic was injected in the area of the nerve and spread of the anesthesia was seen on ultrasound in close proximity thereto.  There were no abnormalities seen on ultrasound; a digital image was taken; and the patient tolerated the procedure with no complications. Images:still images obtained    Block Type:popliteal  Injection Technique:single-shot  Needle Type:echogenic  Needle Gauge:22 G    Medications  Comment:4 mg decadron added to  Local anes  Local Injected:ropivacaine 0.5% and 2% Mepivacaine Local Amount Injected:25mL  Post Assessment  Injection Assessment: no paresthesia on injection, incremental injection and negative aspiration for heme  Patient Tolerance:comfortable throughout block  Complications:no

## 2017-12-06 NOTE — ANESTHESIA POSTPROCEDURE EVALUATION
"Patient: Martha Davey    Procedure Summary     Date Anesthesia Start Anesthesia Stop Room / Location    12/06/17 1201 1416  [facility] OR 12 / [facility] MAIN OR       Procedure Diagnosis Surgeon Provider    TIBIA INTRAMEDULLARY NAIL/DON INSERTION (Left Leg Lower) Closed fracture of left tibia and fibula, initial encounter  (Closed fracture of left tibia and fibula, initial encounter [S82.202A, S82.402A]) MD Aniceto Mahmood MD          Anesthesia Type: general  Last vitals  BP   134/84 (12/06/17 1430)   Temp   36.8 °C (98.3 °F) (12/06/17 1415)   Pulse   109 (12/06/17 1430)   Resp   14 (12/06/17 1430)     SpO2   97 % (12/06/17 1430)     Post Anesthesia Care and Evaluation    Patient location during evaluation: PACU  Patient participation: complete - patient participated  Level of consciousness: awake and alert  Pain management: adequate  Airway patency: patent  Anesthetic complications: No anesthetic complications    Cardiovascular status: acceptable  Respiratory status: acceptable  Hydration status: acceptable    Comments: /84  Pulse 109  Temp 36.8 °C (98.3 °F) (Oral)   Resp 14  Ht 154.9 cm (61\")  Wt 86.4 kg (190 lb 6.4 oz)  LMP  (LMP Unknown)  SpO2 97%  Breastfeeding? No  BMI 35.98 kg/m2      "

## 2017-12-06 NOTE — ANESTHESIA PREPROCEDURE EVALUATION
Anesthesia Evaluation     Patient summary reviewed and Nursing notes reviewed   NPO Solid Status: > 8 hours  NPO Liquid Status: > 8 hours     Airway   Mallampati: III  small opening, difficult intubation highly probable and anterior  Dental      Pulmonary - normal exam    breath sounds clear to auscultation  (+) pulmonary embolism, sleep apnea on CPAP,   Cardiovascular - normal exam    ECG reviewed    (+) hypertension, hyperlipidemia      Neuro/Psych  (+) numbness,    GI/Hepatic/Renal/Endo    (+)  GERD,     Musculoskeletal (-) negative ROS    Abdominal    Substance History - negative use     OB/GYN          Other      history of cancer                                    Anesthesia Plan    ASA 3     general     intravenous induction   Anesthetic plan and risks discussed with patient.

## 2017-12-06 NOTE — ANESTHESIA PROCEDURE NOTES
Airway  Urgency: elective    Airway not difficult    General Information and Staff    Patient location during procedure: OR  Anesthesiologist: NBA ZAMBRANO  CRNA: GERMAN DIAZ    Indications and Patient Condition  Indications for airway management: airway protection    Preoxygenated: yes  MILS maintained throughout  Mask difficulty assessment: 1 - vent by mask    Final Airway Details  Final airway type: endotracheal airway      Successful airway: ETT  Cuffed: yes   Successful intubation technique: direct laryngoscopy  Facilitating devices/methods: anterior pressure/BURP  Endotracheal tube insertion site: oral  Blade: CMAC  Blade size: #3  ETT size: 7.0 mm  Cormack-Lehane Classification: grade IIa - partial view of glottis  Placement verified by: chest auscultation and capnometry   Cuff volume (mL): 8  Measured from: lips  ETT to lips (cm): 21  Number of attempts at approach: 1    Additional Comments  Pt preoxygenated, SIVI, bag mask vent, ATETI with cmac D blade, dentition as before

## 2017-12-06 NOTE — ANESTHESIA PROCEDURE NOTES
Peripheral Block    Patient location during procedure: holding area  Start time: 12/6/2017 10:42 AM  Stop time: 12/6/2017 10:54 AM  Reason for block: at surgeon's request and post-op pain management  Performed by  Anesthesiologist: CARLINE DAVIS  Preanesthetic Checklist  Completed: patient identified, site marked, surgical consent, pre-op evaluation, timeout performed, IV checked, risks and benefits discussed and monitors and equipment checked  Prep:  Pt Position: supine  Sterile barriers:gloves and sterile barriers  Prep: ChloraPrep  Patient monitoring: blood pressure monitoring, continuous pulse oximetry and EKG  Procedure  Sedation:yes  Performed under: local infiltration  Guidance:ultrasound guided and nerve stimulator  ULTRASOUND INTERPRETATION. Using ultrasound guidance a gauge needle was placed in close proximity to the femoral nerve, at which point, under ultrasound guidance anesthetic was injected in the area of the nerve and spread of the anesthesia was seen on ultrasound in close proximity thereto.  There were no abnormalities seen on ultrasound; a digital image was taken; and the patient tolerated the procedure with no complications. Images:still images obtained    Laterality:left  Block Type:femoral  Injection Technique:single-shot  Needle Type:echogenic  Needle Gauge:22 G    Medications  Comment:4 mg decadron  Added to local anes  Local Injected:2% Mepivacaine and ropivacaine 0.5% Local Amount Injected:25mL  Post Assessment  Injection Assessment: no paresthesia on injection, incremental injection and negative aspiration for heme  Patient Tolerance:comfortable throughout block  Complications:no

## 2017-12-07 ENCOUNTER — APPOINTMENT (OUTPATIENT)
Dept: MRI IMAGING | Facility: HOSPITAL | Age: 57
End: 2017-12-07

## 2017-12-07 PROBLEM — G89.3 CANCER ASSOCIATED PAIN: Status: ACTIVE | Noted: 2017-12-07

## 2017-12-07 LAB
ANION GAP SERPL CALCULATED.3IONS-SCNC: 19.9 MMOL/L
BACTERIA SPEC AEROBE CULT: NO GROWTH
BASOPHILS # BLD AUTO: 0.01 10*3/MM3 (ref 0–0.2)
BASOPHILS # BLD AUTO: 0.01 10*3/MM3 (ref 0–0.2)
BASOPHILS NFR BLD AUTO: 0.1 % (ref 0–1.5)
BASOPHILS NFR BLD AUTO: 0.1 % (ref 0–1.5)
BUN BLD-MCNC: 33 MG/DL (ref 6–20)
BUN/CREAT SERPL: 47.8 (ref 7–25)
CALCIUM SPEC-SCNC: 9.1 MG/DL (ref 8.6–10.5)
CHLORIDE SERPL-SCNC: 98 MMOL/L (ref 98–107)
CO2 SERPL-SCNC: 17.1 MMOL/L (ref 22–29)
CREAT BLD-MCNC: 0.69 MG/DL (ref 0.57–1)
DEPRECATED RDW RBC AUTO: 48.1 FL (ref 37–54)
DEPRECATED RDW RBC AUTO: 50.6 FL (ref 37–54)
EOSINOPHIL # BLD AUTO: 0 10*3/MM3 (ref 0–0.7)
EOSINOPHIL # BLD AUTO: 0 10*3/MM3 (ref 0–0.7)
EOSINOPHIL NFR BLD AUTO: 0 % (ref 0.3–6.2)
EOSINOPHIL NFR BLD AUTO: 0 % (ref 0.3–6.2)
ERYTHROCYTE [DISTWIDTH] IN BLOOD BY AUTOMATED COUNT: 14.4 % (ref 11.7–13)
ERYTHROCYTE [DISTWIDTH] IN BLOOD BY AUTOMATED COUNT: 14.5 % (ref 11.7–13)
GFR SERPL CREATININE-BSD FRML MDRD: 88 ML/MIN/1.73
GLUCOSE BLD-MCNC: 150 MG/DL (ref 65–99)
HCT VFR BLD AUTO: 32 % (ref 35.6–45.5)
HCT VFR BLD AUTO: 34.8 % (ref 35.6–45.5)
HGB BLD-MCNC: 10.3 G/DL (ref 11.9–15.5)
HGB BLD-MCNC: 10.7 G/DL (ref 11.9–15.5)
IMM GRANULOCYTES # BLD: 0.13 10*3/MM3 (ref 0–0.03)
IMM GRANULOCYTES # BLD: 0.14 10*3/MM3 (ref 0–0.03)
IMM GRANULOCYTES NFR BLD: 1.4 % (ref 0–0.5)
IMM GRANULOCYTES NFR BLD: 1.4 % (ref 0–0.5)
LAB AP CASE REPORT: NORMAL
LAB AP CLINICAL INFORMATION: NORMAL
LAB AP INTRADEPARTMENTAL CONSULT: NORMAL
LYMPHOCYTES # BLD AUTO: 0.9 10*3/MM3 (ref 0.9–4.8)
LYMPHOCYTES # BLD AUTO: 0.92 10*3/MM3 (ref 0.9–4.8)
LYMPHOCYTES NFR BLD AUTO: 9.3 % (ref 19.6–45.3)
LYMPHOCYTES NFR BLD AUTO: 9.7 % (ref 19.6–45.3)
Lab: NORMAL
MCH RBC QN AUTO: 29.9 PG (ref 26.9–32)
MCH RBC QN AUTO: 29.9 PG (ref 26.9–32)
MCHC RBC AUTO-ENTMCNC: 30.7 G/DL (ref 32.4–36.3)
MCHC RBC AUTO-ENTMCNC: 32.2 G/DL (ref 32.4–36.3)
MCV RBC AUTO: 93 FL (ref 80.5–98.2)
MCV RBC AUTO: 97.2 FL (ref 80.5–98.2)
MONOCYTES # BLD AUTO: 0.66 10*3/MM3 (ref 0.2–1.2)
MONOCYTES # BLD AUTO: 0.74 10*3/MM3 (ref 0.2–1.2)
MONOCYTES NFR BLD AUTO: 7 % (ref 5–12)
MONOCYTES NFR BLD AUTO: 7.7 % (ref 5–12)
NEUTROPHILS # BLD AUTO: 7.73 10*3/MM3 (ref 1.9–8.1)
NEUTROPHILS # BLD AUTO: 7.87 10*3/MM3 (ref 1.9–8.1)
NEUTROPHILS NFR BLD AUTO: 81.5 % (ref 42.7–76)
NEUTROPHILS NFR BLD AUTO: 81.8 % (ref 42.7–76)
PATH REPORT.FINAL DX SPEC: NORMAL
PATH REPORT.GROSS SPEC: NORMAL
PLATELET # BLD AUTO: 212 10*3/MM3 (ref 140–500)
PLATELET # BLD AUTO: 237 10*3/MM3 (ref 140–500)
PMV BLD AUTO: 10.1 FL (ref 6–12)
PMV BLD AUTO: 10.3 FL (ref 6–12)
POTASSIUM BLD-SCNC: 5.2 MMOL/L (ref 3.5–5.2)
RBC # BLD AUTO: 3.44 10*6/MM3 (ref 3.9–5.2)
RBC # BLD AUTO: 3.58 10*6/MM3 (ref 3.9–5.2)
SODIUM BLD-SCNC: 135 MMOL/L (ref 136–145)
WBC NRBC COR # BLD: 9.45 10*3/MM3 (ref 4.5–10.7)
WBC NRBC COR # BLD: 9.66 10*3/MM3 (ref 4.5–10.7)

## 2017-12-07 PROCEDURE — A9577 INJ MULTIHANCE: HCPCS | Performed by: INTERNAL MEDICINE

## 2017-12-07 PROCEDURE — 72157 MRI CHEST SPINE W/O & W/DYE: CPT

## 2017-12-07 PROCEDURE — 25010000002 ENOXAPARIN PER 10 MG: Performed by: INTERNAL MEDICINE

## 2017-12-07 PROCEDURE — 97162 PT EVAL MOD COMPLEX 30 MIN: CPT

## 2017-12-07 PROCEDURE — 80048 BASIC METABOLIC PNL TOTAL CA: CPT | Performed by: HOSPITALIST

## 2017-12-07 PROCEDURE — 99024 POSTOP FOLLOW-UP VISIT: CPT | Performed by: NURSE PRACTITIONER

## 2017-12-07 PROCEDURE — 0 GADOBENATE DIMEGLUMINE 529 MG/ML SOLUTION: Performed by: INTERNAL MEDICINE

## 2017-12-07 PROCEDURE — 63710000001 DEXAMETHASONE PER 0.25 MG: Performed by: INTERNAL MEDICINE

## 2017-12-07 PROCEDURE — 25010000002 CEFTRIAXONE PER 250 MG: Performed by: HOSPITALIST

## 2017-12-07 PROCEDURE — 85025 COMPLETE CBC W/AUTO DIFF WBC: CPT | Performed by: INTERNAL MEDICINE

## 2017-12-07 PROCEDURE — 25010000003 CEFAZOLIN IN DEXTROSE 2-4 GM/100ML-% SOLUTION: Performed by: NURSE PRACTITIONER

## 2017-12-07 PROCEDURE — 25010000002 HYDROMORPHONE PER 4 MG: Performed by: HOSPITALIST

## 2017-12-07 PROCEDURE — 97110 THERAPEUTIC EXERCISES: CPT

## 2017-12-07 PROCEDURE — 85025 COMPLETE CBC W/AUTO DIFF WBC: CPT | Performed by: HOSPITALIST

## 2017-12-07 PROCEDURE — 72158 MRI LUMBAR SPINE W/O & W/DYE: CPT

## 2017-12-07 PROCEDURE — 99232 SBSQ HOSP IP/OBS MODERATE 35: CPT | Performed by: INTERNAL MEDICINE

## 2017-12-07 PROCEDURE — 72156 MRI NECK SPINE W/O & W/DYE: CPT

## 2017-12-07 RX ORDER — LORAZEPAM 1 MG/1
1 TABLET ORAL ONCE
Status: COMPLETED | OUTPATIENT
Start: 2017-12-07 | End: 2017-12-07

## 2017-12-07 RX ADMIN — DEXAMETHASONE 4 MG: 4 TABLET ORAL at 12:02

## 2017-12-07 RX ADMIN — HYDROMORPHONE HYDROCHLORIDE 0.5 MG: 10 INJECTION INTRAMUSCULAR; INTRAVENOUS; SUBCUTANEOUS at 19:57

## 2017-12-07 RX ADMIN — CEFTRIAXONE SODIUM 1 G: 1 INJECTION, SOLUTION INTRAVENOUS at 19:58

## 2017-12-07 RX ADMIN — PANTOPRAZOLE SODIUM 40 MG: 40 TABLET, DELAYED RELEASE ORAL at 06:21

## 2017-12-07 RX ADMIN — GABAPENTIN 300 MG: 300 CAPSULE ORAL at 20:05

## 2017-12-07 RX ADMIN — MESALAMINE 1.2 G: 1.2 TABLET, DELAYED RELEASE ORAL at 19:34

## 2017-12-07 RX ADMIN — DEXAMETHASONE 4 MG: 4 TABLET ORAL at 19:35

## 2017-12-07 RX ADMIN — LORAZEPAM 1 MG: 1 TABLET ORAL at 18:59

## 2017-12-07 RX ADMIN — TRAMADOL HYDROCHLORIDE 50 MG: 50 TABLET ORAL at 19:16

## 2017-12-07 RX ADMIN — MESALAMINE 1.2 G: 1.2 TABLET, DELAYED RELEASE ORAL at 12:02

## 2017-12-07 RX ADMIN — POLYETHYLENE GLYCOL 3350 17 G: 17 POWDER, FOR SOLUTION ORAL at 12:03

## 2017-12-07 RX ADMIN — CEFAZOLIN SODIUM 2 G: 2 INJECTION, SOLUTION INTRAVENOUS at 05:00

## 2017-12-07 RX ADMIN — HYDROMORPHONE HYDROCHLORIDE 0.5 MG: 10 INJECTION INTRAMUSCULAR; INTRAVENOUS; SUBCUTANEOUS at 04:00

## 2017-12-07 RX ADMIN — GADOBENATE DIMEGLUMINE 18 ML: 529 INJECTION, SOLUTION INTRAVENOUS at 22:24

## 2017-12-07 RX ADMIN — HYDROMORPHONE HYDROCHLORIDE 0.5 MG: 10 INJECTION INTRAMUSCULAR; INTRAVENOUS; SUBCUTANEOUS at 07:11

## 2017-12-07 RX ADMIN — GABAPENTIN 300 MG: 300 CAPSULE ORAL at 11:20

## 2017-12-07 RX ADMIN — TRAMADOL HYDROCHLORIDE 50 MG: 50 TABLET ORAL at 12:03

## 2017-12-07 RX ADMIN — ENOXAPARIN SODIUM 40 MG: 40 INJECTION SUBCUTANEOUS at 18:59

## 2017-12-07 RX ADMIN — ATORVASTATIN CALCIUM 10 MG: 10 TABLET, FILM COATED ORAL at 12:02

## 2017-12-08 ENCOUNTER — APPOINTMENT (OUTPATIENT)
Dept: ONCOLOGY | Facility: CLINIC | Age: 57
End: 2017-12-08

## 2017-12-08 ENCOUNTER — APPOINTMENT (OUTPATIENT)
Dept: CT IMAGING | Facility: HOSPITAL | Age: 57
End: 2017-12-08

## 2017-12-08 ENCOUNTER — APPOINTMENT (OUTPATIENT)
Dept: LAB | Facility: HOSPITAL | Age: 57
End: 2017-12-08

## 2017-12-08 PROBLEM — G95.20 CORD COMPRESSION: Status: ACTIVE | Noted: 2017-12-08

## 2017-12-08 PROBLEM — K59.03 DRUG INDUCED CONSTIPATION: Status: ACTIVE | Noted: 2017-12-08

## 2017-12-08 LAB
ALBUMIN SERPL-MCNC: 3.1 G/DL (ref 3.5–5.2)
ALBUMIN/GLOB SERPL: 1.1 G/DL
ALP SERPL-CCNC: 62 U/L (ref 39–117)
ALT SERPL W P-5'-P-CCNC: 9 U/L (ref 1–33)
ANION GAP SERPL CALCULATED.3IONS-SCNC: 13.3 MMOL/L
AST SERPL-CCNC: 10 U/L (ref 1–32)
BASOPHILS # BLD AUTO: 0 10*3/MM3 (ref 0–0.2)
BASOPHILS NFR BLD AUTO: 0 % (ref 0–1.5)
BILIRUB SERPL-MCNC: 0.2 MG/DL (ref 0.1–1.2)
BUN BLD-MCNC: 36 MG/DL (ref 6–20)
BUN/CREAT SERPL: 56.3 (ref 7–25)
CALCIUM SPEC-SCNC: 9.4 MG/DL (ref 8.6–10.5)
CHLORIDE SERPL-SCNC: 99 MMOL/L (ref 98–107)
CO2 SERPL-SCNC: 23.7 MMOL/L (ref 22–29)
CREAT BLD-MCNC: 0.64 MG/DL (ref 0.57–1)
DEPRECATED RDW RBC AUTO: 49.1 FL (ref 37–54)
EOSINOPHIL # BLD AUTO: 0 10*3/MM3 (ref 0–0.7)
EOSINOPHIL NFR BLD AUTO: 0 % (ref 0.3–6.2)
ERYTHROCYTE [DISTWIDTH] IN BLOOD BY AUTOMATED COUNT: 14.5 % (ref 11.7–13)
GFR SERPL CREATININE-BSD FRML MDRD: 96 ML/MIN/1.73
GLOBULIN UR ELPH-MCNC: 2.8 GM/DL
GLUCOSE BLD-MCNC: 135 MG/DL (ref 65–99)
HCT VFR BLD AUTO: 31.4 % (ref 35.6–45.5)
HGB BLD-MCNC: 9.9 G/DL (ref 11.9–15.5)
IMM GRANULOCYTES # BLD: 0.13 10*3/MM3 (ref 0–0.03)
IMM GRANULOCYTES NFR BLD: 1.8 % (ref 0–0.5)
LYMPHOCYTES # BLD AUTO: 0.93 10*3/MM3 (ref 0.9–4.8)
LYMPHOCYTES NFR BLD AUTO: 12.8 % (ref 19.6–45.3)
MCH RBC QN AUTO: 29.8 PG (ref 26.9–32)
MCHC RBC AUTO-ENTMCNC: 31.5 G/DL (ref 32.4–36.3)
MCV RBC AUTO: 94.6 FL (ref 80.5–98.2)
MONOCYTES # BLD AUTO: 0.47 10*3/MM3 (ref 0.2–1.2)
MONOCYTES NFR BLD AUTO: 6.4 % (ref 5–12)
NEUTROPHILS # BLD AUTO: 5.76 10*3/MM3 (ref 1.9–8.1)
NEUTROPHILS NFR BLD AUTO: 79 % (ref 42.7–76)
PLATELET # BLD AUTO: 223 10*3/MM3 (ref 140–500)
PMV BLD AUTO: 10.3 FL (ref 6–12)
POTASSIUM BLD-SCNC: 4.5 MMOL/L (ref 3.5–5.2)
PROT SERPL-MCNC: 5.9 G/DL (ref 6–8.5)
RBC # BLD AUTO: 3.32 10*6/MM3 (ref 3.9–5.2)
SODIUM BLD-SCNC: 136 MMOL/L (ref 136–145)
WBC NRBC COR # BLD: 7.29 10*3/MM3 (ref 4.5–10.7)

## 2017-12-08 PROCEDURE — 74177 CT ABD & PELVIS W/CONTRAST: CPT

## 2017-12-08 PROCEDURE — 99233 SBSQ HOSP IP/OBS HIGH 50: CPT | Performed by: INTERNAL MEDICINE

## 2017-12-08 PROCEDURE — 25010000002 DEXAMETHASONE PER 1 MG: Performed by: INTERNAL MEDICINE

## 2017-12-08 PROCEDURE — 0 IOPAMIDOL 61 % SOLUTION: Performed by: INTERNAL MEDICINE

## 2017-12-08 PROCEDURE — 63710000001 DEXAMETHASONE PER 0.25 MG: Performed by: INTERNAL MEDICINE

## 2017-12-08 PROCEDURE — 99024 POSTOP FOLLOW-UP VISIT: CPT | Performed by: NURSE PRACTITIONER

## 2017-12-08 PROCEDURE — 94799 UNLISTED PULMONARY SVC/PX: CPT

## 2017-12-08 PROCEDURE — 85025 COMPLETE CBC W/AUTO DIFF WBC: CPT | Performed by: INTERNAL MEDICINE

## 2017-12-08 PROCEDURE — 99221 1ST HOSP IP/OBS SF/LOW 40: CPT | Performed by: RADIOLOGY

## 2017-12-08 PROCEDURE — 80053 COMPREHEN METABOLIC PANEL: CPT | Performed by: INTERNAL MEDICINE

## 2017-12-08 PROCEDURE — 25010000002 ENOXAPARIN PER 10 MG: Performed by: INTERNAL MEDICINE

## 2017-12-08 PROCEDURE — 71260 CT THORAX DX C+: CPT

## 2017-12-08 RX ORDER — SENNA AND DOCUSATE SODIUM 50; 8.6 MG/1; MG/1
2 TABLET, FILM COATED ORAL 2 TIMES DAILY
Status: DISCONTINUED | OUTPATIENT
Start: 2017-12-08 | End: 2017-12-09

## 2017-12-08 RX ORDER — DEXAMETHASONE SODIUM PHOSPHATE 4 MG/ML
4 INJECTION, SOLUTION INTRA-ARTICULAR; INTRALESIONAL; INTRAMUSCULAR; INTRAVENOUS; SOFT TISSUE EVERY 6 HOURS
Status: DISCONTINUED | OUTPATIENT
Start: 2017-12-08 | End: 2017-12-13

## 2017-12-08 RX ADMIN — GABAPENTIN 300 MG: 300 CAPSULE ORAL at 20:05

## 2017-12-08 RX ADMIN — FENTANYL 1 PATCH: 50 PATCH, EXTENDED RELEASE TRANSDERMAL at 18:26

## 2017-12-08 RX ADMIN — MESALAMINE 1.2 G: 1.2 TABLET, DELAYED RELEASE ORAL at 09:03

## 2017-12-08 RX ADMIN — DEXAMETHASONE SODIUM PHOSPHATE 4 MG: 4 INJECTION, SOLUTION INTRAMUSCULAR; INTRAVENOUS at 14:58

## 2017-12-08 RX ADMIN — DOCUSATE SODIUM -SENNOSIDES 2 TABLET: 50; 8.6 TABLET, COATED ORAL at 18:25

## 2017-12-08 RX ADMIN — ATORVASTATIN CALCIUM 10 MG: 10 TABLET, FILM COATED ORAL at 09:04

## 2017-12-08 RX ADMIN — LACTULOSE 20 G: 20 SOLUTION ORAL at 20:06

## 2017-12-08 RX ADMIN — ENOXAPARIN SODIUM 90 MG: 100 INJECTION SUBCUTANEOUS at 09:03

## 2017-12-08 RX ADMIN — POLYETHYLENE GLYCOL 3350 17 G: 17 POWDER, FOR SOLUTION ORAL at 09:07

## 2017-12-08 RX ADMIN — DEXAMETHASONE 4 MG: 4 TABLET ORAL at 09:04

## 2017-12-08 RX ADMIN — IOPAMIDOL 85 ML: 612 INJECTION, SOLUTION INTRAVENOUS at 18:45

## 2017-12-08 RX ADMIN — ENOXAPARIN SODIUM 90 MG: 100 INJECTION SUBCUTANEOUS at 20:05

## 2017-12-08 RX ADMIN — MESALAMINE 1.2 G: 1.2 TABLET, DELAYED RELEASE ORAL at 18:25

## 2017-12-08 RX ADMIN — TRAMADOL HYDROCHLORIDE 50 MG: 50 TABLET ORAL at 19:52

## 2017-12-08 RX ADMIN — BISACODYL 10 MG: 10 SUPPOSITORY RECTAL at 22:21

## 2017-12-08 RX ADMIN — DOCUSATE SODIUM -SENNOSIDES 2 TABLET: 50; 8.6 TABLET, COATED ORAL at 13:59

## 2017-12-08 RX ADMIN — CYCLOBENZAPRINE HYDROCHLORIDE 10 MG: 10 TABLET, FILM COATED ORAL at 10:31

## 2017-12-08 RX ADMIN — DEXAMETHASONE SODIUM PHOSPHATE 4 MG: 4 INJECTION, SOLUTION INTRAMUSCULAR; INTRAVENOUS at 18:25

## 2017-12-08 RX ADMIN — TRAMADOL HYDROCHLORIDE 50 MG: 50 TABLET ORAL at 07:01

## 2017-12-08 RX ADMIN — GABAPENTIN 300 MG: 300 CAPSULE ORAL at 09:03

## 2017-12-08 RX ADMIN — PANTOPRAZOLE SODIUM 40 MG: 40 TABLET, DELAYED RELEASE ORAL at 07:01

## 2017-12-09 ENCOUNTER — APPOINTMENT (OUTPATIENT)
Dept: MRI IMAGING | Facility: HOSPITAL | Age: 57
End: 2017-12-09

## 2017-12-09 ENCOUNTER — APPOINTMENT (OUTPATIENT)
Dept: NUCLEAR MEDICINE | Facility: HOSPITAL | Age: 57
End: 2017-12-09

## 2017-12-09 ENCOUNTER — APPOINTMENT (OUTPATIENT)
Dept: GENERAL RADIOLOGY | Facility: HOSPITAL | Age: 57
End: 2017-12-09

## 2017-12-09 PROBLEM — S22.059A: Status: ACTIVE | Noted: 2017-12-09

## 2017-12-09 LAB
ALBUMIN SERPL-MCNC: 3.3 G/DL (ref 3.5–5.2)
ALBUMIN/GLOB SERPL: 1 G/DL
ALP SERPL-CCNC: 72 U/L (ref 39–117)
ALT SERPL W P-5'-P-CCNC: 12 U/L (ref 1–33)
ANION GAP SERPL CALCULATED.3IONS-SCNC: 15.3 MMOL/L
AST SERPL-CCNC: 11 U/L (ref 1–32)
BASOPHILS # BLD AUTO: 0.01 10*3/MM3 (ref 0–0.2)
BASOPHILS NFR BLD AUTO: 0.1 % (ref 0–1.5)
BILIRUB SERPL-MCNC: 0.3 MG/DL (ref 0.1–1.2)
BUN BLD-MCNC: 30 MG/DL (ref 6–20)
BUN/CREAT SERPL: 49.2 (ref 7–25)
CALCIUM SPEC-SCNC: 9.6 MG/DL (ref 8.6–10.5)
CHLORIDE SERPL-SCNC: 96 MMOL/L (ref 98–107)
CO2 SERPL-SCNC: 23.7 MMOL/L (ref 22–29)
CREAT BLD-MCNC: 0.61 MG/DL (ref 0.57–1)
DEPRECATED RDW RBC AUTO: 48.8 FL (ref 37–54)
EOSINOPHIL # BLD AUTO: 0 10*3/MM3 (ref 0–0.7)
EOSINOPHIL NFR BLD AUTO: 0 % (ref 0.3–6.2)
ERYTHROCYTE [DISTWIDTH] IN BLOOD BY AUTOMATED COUNT: 14.5 % (ref 11.7–13)
GFR SERPL CREATININE-BSD FRML MDRD: 101 ML/MIN/1.73
GLOBULIN UR ELPH-MCNC: 3.2 GM/DL
GLUCOSE BLD-MCNC: 139 MG/DL (ref 65–99)
HCT VFR BLD AUTO: 33.1 % (ref 35.6–45.5)
HGB BLD-MCNC: 10.7 G/DL (ref 11.9–15.5)
IMM GRANULOCYTES # BLD: 0.19 10*3/MM3 (ref 0–0.03)
IMM GRANULOCYTES NFR BLD: 2.1 % (ref 0–0.5)
LYMPHOCYTES # BLD AUTO: 1.07 10*3/MM3 (ref 0.9–4.8)
LYMPHOCYTES NFR BLD AUTO: 11.7 % (ref 19.6–45.3)
MCH RBC QN AUTO: 30.2 PG (ref 26.9–32)
MCHC RBC AUTO-ENTMCNC: 32.3 G/DL (ref 32.4–36.3)
MCV RBC AUTO: 93.5 FL (ref 80.5–98.2)
MONOCYTES # BLD AUTO: 0.51 10*3/MM3 (ref 0.2–1.2)
MONOCYTES NFR BLD AUTO: 5.6 % (ref 5–12)
NEUTROPHILS # BLD AUTO: 7.33 10*3/MM3 (ref 1.9–8.1)
NEUTROPHILS NFR BLD AUTO: 80.5 % (ref 42.7–76)
PLATELET # BLD AUTO: 240 10*3/MM3 (ref 140–500)
PMV BLD AUTO: 10.2 FL (ref 6–12)
POTASSIUM BLD-SCNC: 4.5 MMOL/L (ref 3.5–5.2)
PROT SERPL-MCNC: 6.5 G/DL (ref 6–8.5)
RBC # BLD AUTO: 3.54 10*6/MM3 (ref 3.9–5.2)
SODIUM BLD-SCNC: 135 MMOL/L (ref 136–145)
WBC NRBC COR # BLD: 9.11 10*3/MM3 (ref 4.5–10.7)

## 2017-12-09 PROCEDURE — 78306 BONE IMAGING WHOLE BODY: CPT

## 2017-12-09 PROCEDURE — 94799 UNLISTED PULMONARY SVC/PX: CPT

## 2017-12-09 PROCEDURE — 25010000002 LORAZEPAM PER 2 MG: Performed by: INTERNAL MEDICINE

## 2017-12-09 PROCEDURE — A9503 TC99M MEDRONATE: HCPCS | Performed by: INTERNAL MEDICINE

## 2017-12-09 PROCEDURE — 0 GADOBENATE DIMEGLUMINE 529 MG/ML SOLUTION: Performed by: INTERNAL MEDICINE

## 2017-12-09 PROCEDURE — 25010000002 ENOXAPARIN PER 10 MG: Performed by: INTERNAL MEDICINE

## 2017-12-09 PROCEDURE — 99233 SBSQ HOSP IP/OBS HIGH 50: CPT | Performed by: INTERNAL MEDICINE

## 2017-12-09 PROCEDURE — 80053 COMPREHEN METABOLIC PANEL: CPT | Performed by: INTERNAL MEDICINE

## 2017-12-09 PROCEDURE — 99222 1ST HOSP IP/OBS MODERATE 55: CPT | Performed by: ORTHOPAEDIC SURGERY

## 2017-12-09 PROCEDURE — 85025 COMPLETE CBC W/AUTO DIFF WBC: CPT | Performed by: INTERNAL MEDICINE

## 2017-12-09 PROCEDURE — 0 TECHNETIUM MEDRONATE KIT: Performed by: INTERNAL MEDICINE

## 2017-12-09 PROCEDURE — 73060 X-RAY EXAM OF HUMERUS: CPT

## 2017-12-09 PROCEDURE — 25010000002 DEXAMETHASONE PER 1 MG: Performed by: INTERNAL MEDICINE

## 2017-12-09 PROCEDURE — 70553 MRI BRAIN STEM W/O & W/DYE: CPT

## 2017-12-09 PROCEDURE — A9577 INJ MULTIHANCE: HCPCS | Performed by: INTERNAL MEDICINE

## 2017-12-09 RX ORDER — OXYCODONE AND ACETAMINOPHEN 7.5; 325 MG/1; MG/1
1 TABLET ORAL EVERY 4 HOURS PRN
Status: DISCONTINUED | OUTPATIENT
Start: 2017-12-09 | End: 2017-12-12

## 2017-12-09 RX ORDER — SENNA AND DOCUSATE SODIUM 50; 8.6 MG/1; MG/1
3 TABLET, FILM COATED ORAL 2 TIMES DAILY
Status: DISCONTINUED | OUTPATIENT
Start: 2017-12-09 | End: 2017-12-13

## 2017-12-09 RX ORDER — LORAZEPAM 2 MG/ML
1 INJECTION INTRAMUSCULAR ONCE
Status: COMPLETED | OUTPATIENT
Start: 2017-12-09 | End: 2017-12-09

## 2017-12-09 RX ORDER — POLYETHYLENE GLYCOL 3350 17 G/17G
17 POWDER, FOR SOLUTION ORAL 2 TIMES DAILY
Status: DISCONTINUED | OUTPATIENT
Start: 2017-12-09 | End: 2017-12-13

## 2017-12-09 RX ORDER — LACTULOSE 10 G/15ML
20 SOLUTION ORAL 2 TIMES DAILY
Status: DISCONTINUED | OUTPATIENT
Start: 2017-12-09 | End: 2017-12-16 | Stop reason: HOSPADM

## 2017-12-09 RX ORDER — TC 99M MEDRONATE 20 MG/10ML
23.8 INJECTION, POWDER, LYOPHILIZED, FOR SOLUTION INTRAVENOUS
Status: COMPLETED | OUTPATIENT
Start: 2017-12-09 | End: 2017-12-09

## 2017-12-09 RX ADMIN — POLYETHYLENE GLYCOL 3350 17 G: 17 POWDER, FOR SOLUTION ORAL at 17:56

## 2017-12-09 RX ADMIN — NYSTATIN 500000 UNITS: 100000 SUSPENSION ORAL at 20:03

## 2017-12-09 RX ADMIN — ENOXAPARIN SODIUM 90 MG: 100 INJECTION SUBCUTANEOUS at 09:20

## 2017-12-09 RX ADMIN — GABAPENTIN 300 MG: 300 CAPSULE ORAL at 20:03

## 2017-12-09 RX ADMIN — LORAZEPAM 1 MG: 2 INJECTION, SOLUTION INTRAMUSCULAR; INTRAVENOUS at 18:47

## 2017-12-09 RX ADMIN — DEXAMETHASONE SODIUM PHOSPHATE 4 MG: 4 INJECTION, SOLUTION INTRAMUSCULAR; INTRAVENOUS at 06:45

## 2017-12-09 RX ADMIN — ENOXAPARIN SODIUM 90 MG: 100 INJECTION SUBCUTANEOUS at 20:04

## 2017-12-09 RX ADMIN — ATORVASTATIN CALCIUM 10 MG: 10 TABLET, FILM COATED ORAL at 09:20

## 2017-12-09 RX ADMIN — DOCUSATE SODIUM -SENNOSIDES 2 TABLET: 50; 8.6 TABLET, COATED ORAL at 09:19

## 2017-12-09 RX ADMIN — CYCLOBENZAPRINE HYDROCHLORIDE 10 MG: 10 TABLET, FILM COATED ORAL at 17:56

## 2017-12-09 RX ADMIN — DEXAMETHASONE SODIUM PHOSPHATE 4 MG: 4 INJECTION, SOLUTION INTRAMUSCULAR; INTRAVENOUS at 13:17

## 2017-12-09 RX ADMIN — MESALAMINE 1.2 G: 1.2 TABLET, DELAYED RELEASE ORAL at 09:20

## 2017-12-09 RX ADMIN — Medication 23.8 MILLICURIE: at 07:40

## 2017-12-09 RX ADMIN — DEXAMETHASONE SODIUM PHOSPHATE 4 MG: 4 INJECTION, SOLUTION INTRAMUSCULAR; INTRAVENOUS at 01:42

## 2017-12-09 RX ADMIN — POLYETHYLENE GLYCOL 3350 17 G: 17 POWDER, FOR SOLUTION ORAL at 09:20

## 2017-12-09 RX ADMIN — NYSTATIN 500000 UNITS: 100000 SUSPENSION ORAL at 17:56

## 2017-12-09 RX ADMIN — PANTOPRAZOLE SODIUM 40 MG: 40 TABLET, DELAYED RELEASE ORAL at 06:45

## 2017-12-09 RX ADMIN — DOCUSATE SODIUM -SENNOSIDES 3 TABLET: 50; 8.6 TABLET, COATED ORAL at 17:56

## 2017-12-09 RX ADMIN — DEXAMETHASONE SODIUM PHOSPHATE 4 MG: 4 INJECTION, SOLUTION INTRAMUSCULAR; INTRAVENOUS at 17:56

## 2017-12-09 RX ADMIN — MESALAMINE 1.2 G: 1.2 TABLET, DELAYED RELEASE ORAL at 17:56

## 2017-12-09 RX ADMIN — LACTULOSE 20 G: 20 SOLUTION ORAL at 17:56

## 2017-12-09 RX ADMIN — GADOBENATE DIMEGLUMINE 18 ML: 529 INJECTION, SOLUTION INTRAVENOUS at 19:26

## 2017-12-09 RX ADMIN — TRAMADOL HYDROCHLORIDE 50 MG: 50 TABLET ORAL at 09:19

## 2017-12-10 PROBLEM — Z86.711 HISTORY OF PULMONARY EMBOLISM: Status: ACTIVE | Noted: 2017-12-10

## 2017-12-10 LAB
ABO GROUP BLD: NORMAL
APTT PPP: 45.3 SECONDS (ref 22.7–35.4)
BASOPHILS # BLD AUTO: 0.01 10*3/MM3 (ref 0–0.2)
BASOPHILS NFR BLD AUTO: 0.1 % (ref 0–1.5)
BLD GP AB SCN SERPL QL: NEGATIVE
DEPRECATED RDW RBC AUTO: 49.5 FL (ref 37–54)
EOSINOPHIL # BLD AUTO: 0 10*3/MM3 (ref 0–0.7)
EOSINOPHIL NFR BLD AUTO: 0 % (ref 0.3–6.2)
ERYTHROCYTE [DISTWIDTH] IN BLOOD BY AUTOMATED COUNT: 14.7 % (ref 11.7–13)
HCT VFR BLD AUTO: 34 % (ref 35.6–45.5)
HGB BLD-MCNC: 10.8 G/DL (ref 11.9–15.5)
IMM GRANULOCYTES # BLD: 0.21 10*3/MM3 (ref 0–0.03)
IMM GRANULOCYTES NFR BLD: 2.3 % (ref 0–0.5)
INR PPP: 1.2 (ref 0.9–1.1)
LYMPHOCYTES # BLD AUTO: 1.19 10*3/MM3 (ref 0.9–4.8)
LYMPHOCYTES NFR BLD AUTO: 13.2 % (ref 19.6–45.3)
MCH RBC QN AUTO: 30.1 PG (ref 26.9–32)
MCHC RBC AUTO-ENTMCNC: 31.8 G/DL (ref 32.4–36.3)
MCV RBC AUTO: 94.7 FL (ref 80.5–98.2)
MONOCYTES # BLD AUTO: 0.63 10*3/MM3 (ref 0.2–1.2)
MONOCYTES NFR BLD AUTO: 7 % (ref 5–12)
NEUTROPHILS # BLD AUTO: 6.98 10*3/MM3 (ref 1.9–8.1)
NEUTROPHILS NFR BLD AUTO: 77.4 % (ref 42.7–76)
PLATELET # BLD AUTO: 264 10*3/MM3 (ref 140–500)
PMV BLD AUTO: 10.2 FL (ref 6–12)
PROTHROMBIN TIME: 14.8 SECONDS (ref 11.7–14.2)
RBC # BLD AUTO: 3.59 10*6/MM3 (ref 3.9–5.2)
RH BLD: POSITIVE
WBC NRBC COR # BLD: 9.02 10*3/MM3 (ref 4.5–10.7)

## 2017-12-10 PROCEDURE — 85025 COMPLETE CBC W/AUTO DIFF WBC: CPT | Performed by: INTERNAL MEDICINE

## 2017-12-10 PROCEDURE — 85730 THROMBOPLASTIN TIME PARTIAL: CPT | Performed by: NEUROLOGICAL SURGERY

## 2017-12-10 PROCEDURE — 86850 RBC ANTIBODY SCREEN: CPT | Performed by: NEUROLOGICAL SURGERY

## 2017-12-10 PROCEDURE — 25010000002 DEXAMETHASONE PER 1 MG: Performed by: INTERNAL MEDICINE

## 2017-12-10 PROCEDURE — 99232 SBSQ HOSP IP/OBS MODERATE 35: CPT | Performed by: INTERNAL MEDICINE

## 2017-12-10 PROCEDURE — 86923 COMPATIBILITY TEST ELECTRIC: CPT

## 2017-12-10 PROCEDURE — 86901 BLOOD TYPING SEROLOGIC RH(D): CPT | Performed by: NEUROLOGICAL SURGERY

## 2017-12-10 PROCEDURE — 99231 SBSQ HOSP IP/OBS SF/LOW 25: CPT | Performed by: ORTHOPAEDIC SURGERY

## 2017-12-10 PROCEDURE — 85610 PROTHROMBIN TIME: CPT | Performed by: NEUROLOGICAL SURGERY

## 2017-12-10 PROCEDURE — 25010000002 ENOXAPARIN PER 10 MG: Performed by: INTERNAL MEDICINE

## 2017-12-10 PROCEDURE — 86900 BLOOD TYPING SEROLOGIC ABO: CPT

## 2017-12-10 PROCEDURE — 86901 BLOOD TYPING SEROLOGIC RH(D): CPT

## 2017-12-10 PROCEDURE — 86900 BLOOD TYPING SEROLOGIC ABO: CPT | Performed by: NEUROLOGICAL SURGERY

## 2017-12-10 RX ADMIN — POLYETHYLENE GLYCOL 3350 17 G: 17 POWDER, FOR SOLUTION ORAL at 08:37

## 2017-12-10 RX ADMIN — DOCUSATE SODIUM -SENNOSIDES 3 TABLET: 50; 8.6 TABLET, COATED ORAL at 17:46

## 2017-12-10 RX ADMIN — LACTULOSE 20 G: 20 SOLUTION ORAL at 17:46

## 2017-12-10 RX ADMIN — LACTULOSE 20 G: 20 SOLUTION ORAL at 08:37

## 2017-12-10 RX ADMIN — NYSTATIN 500000 UNITS: 100000 SUSPENSION ORAL at 13:32

## 2017-12-10 RX ADMIN — BISACODYL 10 MG: 10 SUPPOSITORY RECTAL at 16:34

## 2017-12-10 RX ADMIN — DEXAMETHASONE SODIUM PHOSPHATE 4 MG: 4 INJECTION, SOLUTION INTRAMUSCULAR; INTRAVENOUS at 17:46

## 2017-12-10 RX ADMIN — GABAPENTIN 300 MG: 300 CAPSULE ORAL at 08:38

## 2017-12-10 RX ADMIN — MESALAMINE 1.2 G: 1.2 TABLET, DELAYED RELEASE ORAL at 17:46

## 2017-12-10 RX ADMIN — NYSTATIN 500000 UNITS: 100000 SUSPENSION ORAL at 22:05

## 2017-12-10 RX ADMIN — DEXAMETHASONE SODIUM PHOSPHATE 4 MG: 4 INJECTION, SOLUTION INTRAMUSCULAR; INTRAVENOUS at 00:09

## 2017-12-10 RX ADMIN — PANTOPRAZOLE SODIUM 40 MG: 40 TABLET, DELAYED RELEASE ORAL at 06:23

## 2017-12-10 RX ADMIN — POLYETHYLENE GLYCOL 3350 17 G: 17 POWDER, FOR SOLUTION ORAL at 17:46

## 2017-12-10 RX ADMIN — GABAPENTIN 300 MG: 300 CAPSULE ORAL at 22:05

## 2017-12-10 RX ADMIN — DEXAMETHASONE SODIUM PHOSPHATE 4 MG: 4 INJECTION, SOLUTION INTRAMUSCULAR; INTRAVENOUS at 06:23

## 2017-12-10 RX ADMIN — ENOXAPARIN SODIUM 90 MG: 100 INJECTION SUBCUTANEOUS at 08:38

## 2017-12-10 RX ADMIN — DOCUSATE SODIUM -SENNOSIDES 3 TABLET: 50; 8.6 TABLET, COATED ORAL at 08:38

## 2017-12-10 RX ADMIN — NYSTATIN 500000 UNITS: 100000 SUSPENSION ORAL at 17:46

## 2017-12-10 RX ADMIN — DEXAMETHASONE SODIUM PHOSPHATE 4 MG: 4 INJECTION, SOLUTION INTRAMUSCULAR; INTRAVENOUS at 13:32

## 2017-12-10 RX ADMIN — NYSTATIN 500000 UNITS: 100000 SUSPENSION ORAL at 08:37

## 2017-12-10 RX ADMIN — ATORVASTATIN CALCIUM 10 MG: 10 TABLET, FILM COATED ORAL at 08:38

## 2017-12-10 RX ADMIN — MESALAMINE 1.2 G: 1.2 TABLET, DELAYED RELEASE ORAL at 08:38

## 2017-12-11 ENCOUNTER — APPOINTMENT (OUTPATIENT)
Dept: GENERAL RADIOLOGY | Facility: HOSPITAL | Age: 57
End: 2017-12-11

## 2017-12-11 ENCOUNTER — ANESTHESIA (OUTPATIENT)
Dept: PERIOP | Facility: HOSPITAL | Age: 57
End: 2017-12-11

## 2017-12-11 ENCOUNTER — ANESTHESIA EVENT (OUTPATIENT)
Dept: PERIOP | Facility: HOSPITAL | Age: 57
End: 2017-12-11

## 2017-12-11 LAB
BASOPHILS # BLD AUTO: 0.02 10*3/MM3 (ref 0–0.2)
BASOPHILS NFR BLD AUTO: 0.2 % (ref 0–1.5)
DEPRECATED RDW RBC AUTO: 49.4 FL (ref 37–54)
EOSINOPHIL # BLD AUTO: 0 10*3/MM3 (ref 0–0.7)
EOSINOPHIL NFR BLD AUTO: 0 % (ref 0.3–6.2)
ERYTHROCYTE [DISTWIDTH] IN BLOOD BY AUTOMATED COUNT: 14.9 % (ref 11.7–13)
HCT VFR BLD AUTO: 31.6 % (ref 35.6–45.5)
HCT VFR BLD AUTO: 36 % (ref 35.6–45.5)
HGB BLD-MCNC: 10.2 G/DL (ref 11.9–15.5)
HGB BLD-MCNC: 11.5 G/DL (ref 11.9–15.5)
IMM GRANULOCYTES # BLD: 0.27 10*3/MM3 (ref 0–0.03)
IMM GRANULOCYTES NFR BLD: 2.4 % (ref 0–0.5)
LYMPHOCYTES # BLD AUTO: 1.37 10*3/MM3 (ref 0.9–4.8)
LYMPHOCYTES NFR BLD AUTO: 12.2 % (ref 19.6–45.3)
MCH RBC QN AUTO: 30.1 PG (ref 26.9–32)
MCHC RBC AUTO-ENTMCNC: 31.9 G/DL (ref 32.4–36.3)
MCV RBC AUTO: 94.2 FL (ref 80.5–98.2)
MONOCYTES # BLD AUTO: 0.64 10*3/MM3 (ref 0.2–1.2)
MONOCYTES NFR BLD AUTO: 5.7 % (ref 5–12)
NEUTROPHILS # BLD AUTO: 8.92 10*3/MM3 (ref 1.9–8.1)
NEUTROPHILS NFR BLD AUTO: 79.5 % (ref 42.7–76)
NRBC BLD MANUAL-RTO: 0.6 /100 WBC (ref 0–0)
PLATELET # BLD AUTO: 290 10*3/MM3 (ref 140–500)
PMV BLD AUTO: 10.1 FL (ref 6–12)
RBC # BLD AUTO: 3.82 10*6/MM3 (ref 3.9–5.2)
WBC NRBC COR # BLD: 11.22 10*3/MM3 (ref 4.5–10.7)

## 2017-12-11 PROCEDURE — 25010000003 CEFAZOLIN IN DEXTROSE 2-4 GM/100ML-% SOLUTION: Performed by: ORTHOPAEDIC SURGERY

## 2017-12-11 PROCEDURE — C1751 CATH, INF, PER/CENT/MIDLINE: HCPCS | Performed by: ANESTHESIOLOGY

## 2017-12-11 PROCEDURE — 25010000002 MIDAZOLAM PER 1 MG: Performed by: ANESTHESIOLOGY

## 2017-12-11 PROCEDURE — 07DR3ZZ EXTRACTION OF ILIAC BONE MARROW, PERCUTANEOUS APPROACH: ICD-10-PCS | Performed by: ORTHOPAEDIC SURGERY

## 2017-12-11 PROCEDURE — 88341 IMHCHEM/IMCYTCHM EA ADD ANTB: CPT | Performed by: NEUROLOGICAL SURGERY

## 2017-12-11 PROCEDURE — C1713 ANCHOR/SCREW BN/BN,TIS/BN: HCPCS | Performed by: ORTHOPAEDIC SURGERY

## 2017-12-11 PROCEDURE — 22842 INSERT SPINE FIXATION DEVICE: CPT | Performed by: ORTHOPAEDIC SURGERY

## 2017-12-11 PROCEDURE — 25010000002 PROPOFOL 10 MG/ML EMULSION: Performed by: NURSE ANESTHETIST, CERTIFIED REGISTERED

## 2017-12-11 PROCEDURE — 0PB40ZZ EXCISION OF THORACIC VERTEBRA, OPEN APPROACH: ICD-10-PCS | Performed by: NEUROLOGICAL SURGERY

## 2017-12-11 PROCEDURE — 85025 COMPLETE CBC W/AUTO DIFF WBC: CPT | Performed by: INTERNAL MEDICINE

## 2017-12-11 PROCEDURE — 22614 ARTHRD PST TQ 1NTRSPC EA ADD: CPT | Performed by: ORTHOPAEDIC SURGERY

## 2017-12-11 PROCEDURE — 88307 TISSUE EXAM BY PATHOLOGIST: CPT | Performed by: NEUROLOGICAL SURGERY

## 2017-12-11 PROCEDURE — 25010000002 FENTANYL CITRATE (PF) 100 MCG/2ML SOLUTION: Performed by: ANESTHESIOLOGY

## 2017-12-11 PROCEDURE — 25010000002 DEXAMETHASONE PER 1 MG: Performed by: INTERNAL MEDICINE

## 2017-12-11 PROCEDURE — 25810000003 POTASSIUM CHLORIDE PER 2 MEQ: Performed by: ORTHOPAEDIC SURGERY

## 2017-12-11 PROCEDURE — 72070 X-RAY EXAM THORAC SPINE 2VWS: CPT

## 2017-12-11 PROCEDURE — 85014 HEMATOCRIT: CPT | Performed by: ORTHOPAEDIC SURGERY

## 2017-12-11 PROCEDURE — 25010000002 HYDROMORPHONE PER 4 MG: Performed by: NURSE ANESTHETIST, CERTIFIED REGISTERED

## 2017-12-11 PROCEDURE — 25010000002 PHENYLEPHRINE PER 1 ML: Performed by: NURSE ANESTHETIST, CERTIFIED REGISTERED

## 2017-12-11 PROCEDURE — 0RG7071 FUSION OF 2 TO 7 THORACIC VERTEBRAL JOINTS WITH AUTOLOGOUS TISSUE SUBSTITUTE, POSTERIOR APPROACH, POSTERIOR COLUMN, OPEN APPROACH: ICD-10-PCS | Performed by: ORTHOPAEDIC SURGERY

## 2017-12-11 PROCEDURE — 85018 HEMOGLOBIN: CPT | Performed by: ORTHOPAEDIC SURGERY

## 2017-12-11 PROCEDURE — 38220 DX BONE MARROW ASPIRATIONS: CPT | Performed by: ORTHOPAEDIC SURGERY

## 2017-12-11 PROCEDURE — 76000 FLUOROSCOPY <1 HR PHYS/QHP: CPT

## 2017-12-11 PROCEDURE — 88311 DECALCIFY TISSUE: CPT | Performed by: NEUROLOGICAL SURGERY

## 2017-12-11 PROCEDURE — 63101 REMOVE VERT BODY DCMPRN THRC: CPT | Performed by: NEUROLOGICAL SURGERY

## 2017-12-11 PROCEDURE — 88342 IMHCHEM/IMCYTCHM 1ST ANTB: CPT | Performed by: NEUROLOGICAL SURGERY

## 2017-12-11 PROCEDURE — 25010000002 FENTANYL CITRATE (PF) 100 MCG/2ML SOLUTION: Performed by: NURSE ANESTHETIST, CERTIFIED REGISTERED

## 2017-12-11 PROCEDURE — 25010000002 DEXAMETHASONE PER 1 MG: Performed by: NURSE ANESTHETIST, CERTIFIED REGISTERED

## 2017-12-11 PROCEDURE — 88360 TUMOR IMMUNOHISTOCHEM/MANUAL: CPT

## 2017-12-11 PROCEDURE — 25010000002 ONDANSETRON PER 1 MG: Performed by: NURSE ANESTHETIST, CERTIFIED REGISTERED

## 2017-12-11 PROCEDURE — 22610 ARTHRD PST TQ 1NTRSPC THRC: CPT | Performed by: ORTHOPAEDIC SURGERY

## 2017-12-11 DEVICE — IMPLANTABLE DEVICE: Type: IMPLANTABLE DEVICE | Site: SPINE THORACIC | Status: FUNCTIONAL

## 2017-12-11 DEVICE — SCRW EXPEDIUM PA TI 6X40MM: Type: IMPLANTABLE DEVICE | Site: SPINE THORACIC | Status: FUNCTIONAL

## 2017-12-11 DEVICE — ALLOGRFT BONE VIVIGEN CELLUAR MATRX FZ 5CC: Type: IMPLANTABLE DEVICE | Site: SPINE THORACIC | Status: FUNCTIONAL

## 2017-12-11 DEVICE — SCRW INNR EXPEDIUM: Type: IMPLANTABLE DEVICE | Site: SPINE THORACIC | Status: FUNCTIONAL

## 2017-12-11 RX ORDER — SODIUM CHLORIDE 0.9 % (FLUSH) 0.9 %
1-10 SYRINGE (ML) INJECTION AS NEEDED
Status: DISCONTINUED | OUTPATIENT
Start: 2017-12-11 | End: 2017-12-16 | Stop reason: HOSPADM

## 2017-12-11 RX ORDER — SODIUM CHLORIDE, SODIUM LACTATE, POTASSIUM CHLORIDE, CALCIUM CHLORIDE 600; 310; 30; 20 MG/100ML; MG/100ML; MG/100ML; MG/100ML
100 INJECTION, SOLUTION INTRAVENOUS CONTINUOUS
Status: DISCONTINUED | OUTPATIENT
Start: 2017-12-11 | End: 2017-12-12

## 2017-12-11 RX ORDER — ONDANSETRON 2 MG/ML
INJECTION INTRAMUSCULAR; INTRAVENOUS AS NEEDED
Status: DISCONTINUED | OUTPATIENT
Start: 2017-12-11 | End: 2017-12-11 | Stop reason: SURG

## 2017-12-11 RX ORDER — SODIUM CHLORIDE 0.9 % (FLUSH) 0.9 %
1-10 SYRINGE (ML) INJECTION AS NEEDED
Status: DISCONTINUED | OUTPATIENT
Start: 2017-12-11 | End: 2017-12-11 | Stop reason: HOSPADM

## 2017-12-11 RX ORDER — MIDAZOLAM HYDROCHLORIDE 1 MG/ML
2 INJECTION INTRAMUSCULAR; INTRAVENOUS
Status: DISCONTINUED | OUTPATIENT
Start: 2017-12-11 | End: 2017-12-11 | Stop reason: HOSPADM

## 2017-12-11 RX ORDER — FENTANYL CITRATE 50 UG/ML
50 INJECTION, SOLUTION INTRAMUSCULAR; INTRAVENOUS
Status: COMPLETED | OUTPATIENT
Start: 2017-12-11 | End: 2017-12-11

## 2017-12-11 RX ORDER — MIDAZOLAM HYDROCHLORIDE 1 MG/ML
1 INJECTION INTRAMUSCULAR; INTRAVENOUS
Status: DISCONTINUED | OUTPATIENT
Start: 2017-12-11 | End: 2017-12-11 | Stop reason: HOSPADM

## 2017-12-11 RX ORDER — BISACODYL 10 MG
10 SUPPOSITORY, RECTAL RECTAL DAILY PRN
Status: DISCONTINUED | OUTPATIENT
Start: 2017-12-11 | End: 2017-12-16 | Stop reason: HOSPADM

## 2017-12-11 RX ORDER — SODIUM CHLORIDE 9 MG/ML
INJECTION, SOLUTION INTRAVENOUS AS NEEDED
Status: DISCONTINUED | OUTPATIENT
Start: 2017-12-11 | End: 2017-12-11 | Stop reason: HOSPADM

## 2017-12-11 RX ORDER — SENNA AND DOCUSATE SODIUM 50; 8.6 MG/1; MG/1
1 TABLET, FILM COATED ORAL 2 TIMES DAILY
Status: DISCONTINUED | OUTPATIENT
Start: 2017-12-11 | End: 2017-12-13

## 2017-12-11 RX ORDER — HYDROMORPHONE HYDROCHLORIDE 4 MG/1
4 TABLET ORAL EVERY 4 HOURS PRN
Status: DISCONTINUED | OUTPATIENT
Start: 2017-12-11 | End: 2017-12-13

## 2017-12-11 RX ORDER — ROCURONIUM BROMIDE 10 MG/ML
INJECTION, SOLUTION INTRAVENOUS AS NEEDED
Status: DISCONTINUED | OUTPATIENT
Start: 2017-12-11 | End: 2017-12-11 | Stop reason: SURG

## 2017-12-11 RX ORDER — LIDOCAINE HYDROCHLORIDE 20 MG/ML
INJECTION, SOLUTION INFILTRATION; PERINEURAL AS NEEDED
Status: DISCONTINUED | OUTPATIENT
Start: 2017-12-11 | End: 2017-12-11 | Stop reason: SURG

## 2017-12-11 RX ORDER — ONDANSETRON 4 MG/1
4 TABLET, ORALLY DISINTEGRATING ORAL EVERY 6 HOURS PRN
Status: DISCONTINUED | OUTPATIENT
Start: 2017-12-11 | End: 2017-12-16 | Stop reason: HOSPADM

## 2017-12-11 RX ORDER — SODIUM CHLORIDE, SODIUM LACTATE, POTASSIUM CHLORIDE, CALCIUM CHLORIDE 600; 310; 30; 20 MG/100ML; MG/100ML; MG/100ML; MG/100ML
9 INJECTION, SOLUTION INTRAVENOUS CONTINUOUS
Status: DISCONTINUED | OUTPATIENT
Start: 2017-12-11 | End: 2017-12-12

## 2017-12-11 RX ORDER — TEMAZEPAM 15 MG/1
15 CAPSULE ORAL NIGHTLY PRN
Status: DISCONTINUED | OUTPATIENT
Start: 2017-12-11 | End: 2017-12-16 | Stop reason: HOSPADM

## 2017-12-11 RX ORDER — ONDANSETRON 2 MG/ML
4 INJECTION INTRAMUSCULAR; INTRAVENOUS EVERY 6 HOURS PRN
Status: DISCONTINUED | OUTPATIENT
Start: 2017-12-11 | End: 2017-12-16 | Stop reason: HOSPADM

## 2017-12-11 RX ORDER — ALBUTEROL SULFATE 2.5 MG/3ML
2.5 SOLUTION RESPIRATORY (INHALATION) ONCE AS NEEDED
Status: DISCONTINUED | OUTPATIENT
Start: 2017-12-11 | End: 2017-12-11 | Stop reason: HOSPADM

## 2017-12-11 RX ORDER — ONDANSETRON 4 MG/1
4 TABLET, FILM COATED ORAL EVERY 6 HOURS PRN
Status: DISCONTINUED | OUTPATIENT
Start: 2017-12-11 | End: 2017-12-16 | Stop reason: HOSPADM

## 2017-12-11 RX ORDER — HYDROMORPHONE HYDROCHLORIDE 1 MG/ML
0.5 INJECTION, SOLUTION INTRAMUSCULAR; INTRAVENOUS; SUBCUTANEOUS
Status: DISCONTINUED | OUTPATIENT
Start: 2017-12-11 | End: 2017-12-11 | Stop reason: HOSPADM

## 2017-12-11 RX ORDER — HYDROMORPHONE HCL IN 0.9% NACL 10 MG/50ML
PATIENT CONTROLLED ANALGESIA SYRINGE INTRAVENOUS CONTINUOUS
Status: DISCONTINUED | OUTPATIENT
Start: 2017-12-11 | End: 2017-12-13

## 2017-12-11 RX ORDER — HYDROMORPHONE HCL IN 0.9% NACL 10 MG/50ML
PATIENT CONTROLLED ANALGESIA SYRINGE INTRAVENOUS
Status: COMPLETED
Start: 2017-12-11 | End: 2017-12-11

## 2017-12-11 RX ORDER — ONDANSETRON 2 MG/ML
4 INJECTION INTRAMUSCULAR; INTRAVENOUS ONCE AS NEEDED
Status: DISCONTINUED | OUTPATIENT
Start: 2017-12-11 | End: 2017-12-11 | Stop reason: HOSPADM

## 2017-12-11 RX ORDER — FAMOTIDINE 10 MG/ML
20 INJECTION, SOLUTION INTRAVENOUS ONCE
Status: DISCONTINUED | OUTPATIENT
Start: 2017-12-11 | End: 2017-12-11 | Stop reason: HOSPADM

## 2017-12-11 RX ORDER — CEFAZOLIN SODIUM 2 G/100ML
2 INJECTION, SOLUTION INTRAVENOUS EVERY 8 HOURS
Status: COMPLETED | OUTPATIENT
Start: 2017-12-11 | End: 2017-12-12

## 2017-12-11 RX ORDER — PROPOFOL 10 MG/ML
VIAL (ML) INTRAVENOUS AS NEEDED
Status: DISCONTINUED | OUTPATIENT
Start: 2017-12-11 | End: 2017-12-11 | Stop reason: SURG

## 2017-12-11 RX ORDER — FENTANYL CITRATE 50 UG/ML
50 INJECTION, SOLUTION INTRAMUSCULAR; INTRAVENOUS
Status: DISCONTINUED | OUTPATIENT
Start: 2017-12-11 | End: 2017-12-11 | Stop reason: HOSPADM

## 2017-12-11 RX ORDER — NALOXONE HCL 0.4 MG/ML
0.1 VIAL (ML) INJECTION
Status: DISCONTINUED | OUTPATIENT
Start: 2017-12-11 | End: 2017-12-16 | Stop reason: HOSPADM

## 2017-12-11 RX ORDER — SODIUM CHLORIDE AND POTASSIUM CHLORIDE 150; 450 MG/100ML; MG/100ML
100 INJECTION, SOLUTION INTRAVENOUS CONTINUOUS
Status: DISCONTINUED | OUTPATIENT
Start: 2017-12-11 | End: 2017-12-12

## 2017-12-11 RX ORDER — DEXAMETHASONE SODIUM PHOSPHATE 10 MG/ML
INJECTION INTRAMUSCULAR; INTRAVENOUS AS NEEDED
Status: DISCONTINUED | OUTPATIENT
Start: 2017-12-11 | End: 2017-12-11 | Stop reason: SURG

## 2017-12-11 RX ORDER — HYDRALAZINE HYDROCHLORIDE 20 MG/ML
5 INJECTION INTRAMUSCULAR; INTRAVENOUS
Status: DISCONTINUED | OUTPATIENT
Start: 2017-12-11 | End: 2017-12-11 | Stop reason: HOSPADM

## 2017-12-11 RX ORDER — CEFAZOLIN SODIUM 2 G/100ML
2 INJECTION, SOLUTION INTRAVENOUS ONCE
Status: COMPLETED | OUTPATIENT
Start: 2017-12-11 | End: 2017-12-11

## 2017-12-11 RX ORDER — HYDROMORPHONE HCL 110MG/55ML
PATIENT CONTROLLED ANALGESIA SYRINGE INTRAVENOUS AS NEEDED
Status: DISCONTINUED | OUTPATIENT
Start: 2017-12-11 | End: 2017-12-11 | Stop reason: SURG

## 2017-12-11 RX ORDER — LABETALOL HYDROCHLORIDE 5 MG/ML
5 INJECTION, SOLUTION INTRAVENOUS
Status: DISCONTINUED | OUTPATIENT
Start: 2017-12-11 | End: 2017-12-11 | Stop reason: HOSPADM

## 2017-12-11 RX ADMIN — ROCURONIUM BROMIDE 50 MG: 10 INJECTION INTRAVENOUS at 12:22

## 2017-12-11 RX ADMIN — DEXAMETHASONE SODIUM PHOSPHATE 8 MG: 10 INJECTION INTRAMUSCULAR; INTRAVENOUS at 12:40

## 2017-12-11 RX ADMIN — GABAPENTIN 300 MG: 300 CAPSULE ORAL at 21:59

## 2017-12-11 RX ADMIN — HYDROMORPHONE HYDROCHLORIDE 0.5 MG: 2 INJECTION INTRAMUSCULAR; INTRAVENOUS; SUBCUTANEOUS at 16:14

## 2017-12-11 RX ADMIN — Medication 2 MG: at 11:06

## 2017-12-11 RX ADMIN — SODIUM CHLORIDE, POTASSIUM CHLORIDE, SODIUM LACTATE AND CALCIUM CHLORIDE: 600; 310; 30; 20 INJECTION, SOLUTION INTRAVENOUS at 15:39

## 2017-12-11 RX ADMIN — FENTANYL CITRATE 100 MCG: 50 INJECTION INTRAMUSCULAR; INTRAVENOUS at 12:20

## 2017-12-11 RX ADMIN — PHENYLEPHRINE HYDROCHLORIDE 50 MCG: 10 INJECTION INTRAVENOUS at 12:30

## 2017-12-11 RX ADMIN — PHENYLEPHRINE HYDROCHLORIDE 50 MCG: 10 INJECTION INTRAVENOUS at 16:07

## 2017-12-11 RX ADMIN — PHENYLEPHRINE HYDROCHLORIDE 100 MCG: 10 INJECTION INTRAVENOUS at 12:55

## 2017-12-11 RX ADMIN — FENTANYL CITRATE 50 MCG: 50 INJECTION INTRAMUSCULAR; INTRAVENOUS at 12:48

## 2017-12-11 RX ADMIN — FENTANYL CITRATE 50 MCG: 50 INJECTION INTRAMUSCULAR; INTRAVENOUS at 11:15

## 2017-12-11 RX ADMIN — ROCURONIUM BROMIDE 10 MG: 10 INJECTION INTRAVENOUS at 15:23

## 2017-12-11 RX ADMIN — PHENYLEPHRINE HYDROCHLORIDE 100 MCG: 10 INJECTION INTRAVENOUS at 12:24

## 2017-12-11 RX ADMIN — PHENYLEPHRINE HYDROCHLORIDE 100 MCG: 10 INJECTION INTRAVENOUS at 15:16

## 2017-12-11 RX ADMIN — LIDOCAINE HYDROCHLORIDE 40 MG: 20 INJECTION, SOLUTION INFILTRATION; PERINEURAL at 12:20

## 2017-12-11 RX ADMIN — SUGAMMADEX 170 MG: 100 INJECTION, SOLUTION INTRAVENOUS at 16:28

## 2017-12-11 RX ADMIN — DEXAMETHASONE SODIUM PHOSPHATE 4 MG: 4 INJECTION, SOLUTION INTRAMUSCULAR; INTRAVENOUS at 06:58

## 2017-12-11 RX ADMIN — PHENYLEPHRINE HYDROCHLORIDE 100 MCG: 10 INJECTION INTRAVENOUS at 12:39

## 2017-12-11 RX ADMIN — FENTANYL CITRATE 50 MCG: 50 INJECTION INTRAMUSCULAR; INTRAVENOUS at 13:13

## 2017-12-11 RX ADMIN — PHENYLEPHRINE HYDROCHLORIDE 100 MCG: 10 INJECTION INTRAVENOUS at 15:22

## 2017-12-11 RX ADMIN — PHENYLEPHRINE HYDROCHLORIDE 100 MCG: 10 INJECTION INTRAVENOUS at 12:26

## 2017-12-11 RX ADMIN — SODIUM CHLORIDE, POTASSIUM CHLORIDE, SODIUM LACTATE AND CALCIUM CHLORIDE: 600; 310; 30; 20 INJECTION, SOLUTION INTRAVENOUS at 12:46

## 2017-12-11 RX ADMIN — ROCURONIUM BROMIDE 10 MG: 10 INJECTION INTRAVENOUS at 14:10

## 2017-12-11 RX ADMIN — DEXAMETHASONE SODIUM PHOSPHATE 4 MG: 4 INJECTION, SOLUTION INTRAMUSCULAR; INTRAVENOUS at 01:29

## 2017-12-11 RX ADMIN — PHENYLEPHRINE HYDROCHLORIDE 100 MCG: 10 INJECTION INTRAVENOUS at 15:59

## 2017-12-11 RX ADMIN — ROCURONIUM BROMIDE 20 MG: 10 INJECTION INTRAVENOUS at 13:13

## 2017-12-11 RX ADMIN — FENTANYL CITRATE 50 MCG: 50 INJECTION INTRAMUSCULAR; INTRAVENOUS at 17:06

## 2017-12-11 RX ADMIN — PHENYLEPHRINE HYDROCHLORIDE 100 MCG: 10 INJECTION INTRAVENOUS at 15:42

## 2017-12-11 RX ADMIN — Medication: at 17:35

## 2017-12-11 RX ADMIN — Medication 2 MG: at 12:14

## 2017-12-11 RX ADMIN — POTASSIUM CHLORIDE AND SODIUM CHLORIDE 100 ML/HR: 450; 150 INJECTION, SOLUTION INTRAVENOUS at 22:00

## 2017-12-11 RX ADMIN — ROCURONIUM BROMIDE 10 MG: 10 INJECTION INTRAVENOUS at 14:45

## 2017-12-11 RX ADMIN — ONDANSETRON 4 MG: 2 INJECTION INTRAMUSCULAR; INTRAVENOUS at 16:13

## 2017-12-11 RX ADMIN — PHENYLEPHRINE HYDROCHLORIDE 100 MCG: 10 INJECTION INTRAVENOUS at 15:29

## 2017-12-11 RX ADMIN — HYDROMORPHONE HYDROCHLORIDE 0.5 MG: 2 INJECTION INTRAMUSCULAR; INTRAVENOUS; SUBCUTANEOUS at 16:11

## 2017-12-11 RX ADMIN — FENTANYL CITRATE 50 MCG: 50 INJECTION INTRAMUSCULAR; INTRAVENOUS at 13:31

## 2017-12-11 RX ADMIN — CEFAZOLIN SODIUM 2 G: 2 INJECTION, SOLUTION INTRAVENOUS at 12:20

## 2017-12-11 RX ADMIN — DOCUSATE SODIUM -SENNOSIDES 1 TABLET: 50; 8.6 TABLET, COATED ORAL at 22:00

## 2017-12-11 RX ADMIN — PROPOFOL 250 MG: 10 INJECTION, EMULSION INTRAVENOUS at 12:20

## 2017-12-11 RX ADMIN — CEFAZOLIN SODIUM 2 G: 2 INJECTION, SOLUTION INTRAVENOUS at 22:00

## 2017-12-11 NOTE — ANESTHESIA PROCEDURE NOTES
Central Line    Patient location during procedure: holding area  Start time: 12/11/2017 10:56 AM  Stop Time:12/11/2017 11:10 AM  Indications: vascular access  Staff  Anesthesiologist: GINETTE ROSE  Preanesthetic Checklist  Completed: patient identified, site marked, surgical consent, pre-op evaluation, timeout performed, IV checked, risks and benefits discussed and monitors and equipment checked  Central Line Prep  Sterile Tech:cap, gloves, gown, mask and sterile barriers  Prep: chloraprep  Patient monitoring: blood pressure monitoring, continuous pulse oximetry and EKG  Central Line Procedure  Laterality:right  Location:internal jugular  Catheter Type:double lumen  Catheter Size:8 Fr  Guidance:ultrasound guided  PROCEDURE NOTE/ULTRASOUND INTERPRETATION.  Using ultrasound guidance the potential vascular sites for insertion of the catheter were visualized to determine the patency of the vessel to be used for vascular access.  After selecting the appropriate site for insertion, the needle was visualized under ultrasound being inserted into the internal jugular vein, followed by ultrasound confirmation of wire and catheter placement. There were no abnormalities seen on ultrasound; an image was taken; and the patient tolerated the procedure with no complications.   Assessment  Post procedure:biopatch applied, line sutured and occlusive dressing applied  Assessement:blood return through all ports, free fluid flow and chest x-ray ordered  Complications:no  Patient Tolerance:patient tolerated the procedure well with no apparent complications  Additional Notes  Ultrasound Interpretation:  Using ultrasound guidance the potential vascular sites for insertion of the catheter were visualized to determine the patency of the vessel to be used for vascular access.  After selecting the appropriate site for insertion, the needle was visualized under ultrasound being inserted into the vessel, followed by ultrasound  confirmation of wire and catheter placement.  There were no abnormalities seen on ultrasound; an image was taken/ and the patient tolerated the procedure with no complications.

## 2017-12-11 NOTE — ANESTHESIA PREPROCEDURE EVALUATION
Anesthesia Evaluation     Patient summary reviewed and Nursing notes reviewed   history of anesthetic complications: PONV  NPO Solid Status: > 8 hours  NPO Liquid Status: > 8 hours     Airway   Mallampati: II  TM distance: >3 FB  Neck ROM: full  Dental - normal exam     Pulmonary - normal exam   (+) pulmonary embolism, sleep apnea,   Cardiovascular - normal exam    ECG reviewed    (+) hypertension, valvular problems/murmurs murmur, DVT resolved, hyperlipidemia      Neuro/Psych  (+) numbness,    GI/Hepatic/Renal/Endo    (+) obesity,  GERD,     Musculoskeletal     (+) back pain, radiculopathy  Abdominal    Substance History      OB/GYN          Other   (+) arthritis                                     Anesthesia Plan    ASA 3     general     Anesthetic plan and risks discussed with patient.

## 2017-12-11 NOTE — ANESTHESIA PROCEDURE NOTES
Airway  Urgency: elective    Airway not difficult    General Information and Staff    Patient location during procedure: OR  Anesthesiologist: TERELL BURR  CRNA: KEVIN DOSS    Indications and Patient Condition  Indications for airway management: airway protection    Preoxygenated: yes  Mask difficulty assessment: 1 - vent by mask    Final Airway Details  Final airway type: endotracheal airway      Successful airway: ETT  Cuffed: yes   Successful intubation technique: direct laryngoscopy  Facilitating devices/methods: Bougie  Endotracheal tube insertion site: oral  Blade: Stacy  Blade size: #3  ETT size: 7.0 mm  Cormack-Lehane Classification: grade IIb - view of arytenoids or posterior of glottis only  Placement verified by: chest auscultation and capnometry   Measured from: lips  ETT to lips (cm): 21  Number of attempts at approach: 1    Additional Comments  Smooth IV/mask induction/intubation. Easy bag-mask ventilation. Orally intubated, easy with bougie, atraumatic, lips/teeth/mouth left intact, as preop.  +ETCO2, bilateral breath sounds and equal.

## 2017-12-11 NOTE — ANESTHESIA POSTPROCEDURE EVALUATION
"Patient: Martha Davey    Procedure Summary     Date Anesthesia Start Anesthesia Stop Room / Location    12/11/17 1211 1647  [facility] 07 /  [facility] MAIN OR       Procedure Diagnosis Surgeon Provider    T6 costotransversectomy and decompression with T3 to  T9 posterior spinal fusion with instrumentation with Jennifer Gonzalez and Nael No Cellsaver (N/A Spine Thoracic) Cord compression  (Cord compression [G95.20]) MD Amauri Ramos MD          Anesthesia Type: general  Last vitals  BP   131/79 (12/11/17 1800)   Temp   36.8 °C (98.2 °F) (12/11/17 1800)   Pulse   96 (12/11/17 1800)   Resp   20 (12/11/17 1800)     SpO2   100 % (12/11/17 1800)     Post Anesthesia Care and Evaluation    Patient location during evaluation: PACU  Patient participation: complete - patient participated  Level of consciousness: awake and alert  Pain management: adequate  Airway patency: patent  Anesthetic complications: No anesthetic complications    Cardiovascular status: acceptable  Respiratory status: acceptable  Hydration status: acceptable    Comments: /79 (BP Location: Left arm, Patient Position: Lying)  Pulse 96  Temp 36.8 °C (98.2 °F) (Oral)   Resp 20  Ht 154.9 cm (61\")  Wt 86.4 kg (190 lb 6.4 oz)  LMP  (LMP Unknown)  SpO2 100%  Breastfeeding? No  BMI 35.98 kg/m2      "

## 2017-12-11 NOTE — ANESTHESIA PROCEDURE NOTES
Arterial Line    Patient location during procedure: holding area  Start time: 12/11/2017 11:15 AM  Stop Time:12/11/2017 11:19 AM       Line placed for hemodynamic monitoring.  Performed By   Anesthesiologist: GINETTE ROSE  Preanesthetic Checklist  Completed: patient identified, site marked, surgical consent, pre-op evaluation, timeout performed, IV checked, risks and benefits discussed and monitors and equipment checked  Arterial Line Prep   Sterile Tech: gloves and sterile barriers  Prep: ChloraPrep  Patient monitoring: blood pressure monitoring, continuous pulse oximetry and EKG  Arterial Line Procedure   Laterality:left  Location:  radial artery  Catheter size: 20 G   Guidance: ultrasound guided  PROCEDURE NOTE/ULTRASOUND INTERPRETATION.  Using ultrasound guidance the potential vascular sites for insertion of the catheter were visualized to determine the patency of the vessel to be used for vascular access.  After selecting the appropriate site for insertion, the needle was visualized under ultrasound being inserted into the radial artery, followed by ultrasound confirmation of wire and catheter placement. There were no abnormalities seen on ultrasound; an image was taken; and the patient tolerated the procedure with no complications.   Number of attempts: 1  Successful placement: yes          Post Assessment   Dressing Type: occlusive dressing applied, secured with tape and wrist guard applied.   Complications no  Circ/Move/Sens Assessment: unchanged.   Patient Tolerance: patient tolerated the procedure well with no apparent complications  Additional Notes  Ultrasound Interpretation:  Using ultrasound guidance the potential vascular sites for insertion of the catheter were visualized to determine the patency of the vessel to be used for vascular access.  After selecting the appropriate site for insertion, the needle was visualized under ultrasound being inserted into the vessel, followed by ultrasound  confirmation of wire and catheter placement.  There were no abnormalities seen on ultrasound; an image was taken/ and the patient tolerated the procedure with no complications.

## 2017-12-12 LAB
BASOPHILS # BLD AUTO: 0.02 10*3/MM3 (ref 0–0.2)
BASOPHILS NFR BLD AUTO: 0.1 % (ref 0–1.5)
DEPRECATED RDW RBC AUTO: 50.1 FL (ref 37–54)
EOSINOPHIL # BLD AUTO: 0 10*3/MM3 (ref 0–0.7)
EOSINOPHIL NFR BLD AUTO: 0 % (ref 0.3–6.2)
ERYTHROCYTE [DISTWIDTH] IN BLOOD BY AUTOMATED COUNT: 15.2 % (ref 11.7–13)
HCT VFR BLD AUTO: 32.7 % (ref 35.6–45.5)
HGB BLD-MCNC: 10.3 G/DL (ref 11.9–15.5)
IMM GRANULOCYTES # BLD: 0.22 10*3/MM3 (ref 0–0.03)
IMM GRANULOCYTES NFR BLD: 1.5 % (ref 0–0.5)
LYMPHOCYTES # BLD AUTO: 2.03 10*3/MM3 (ref 0.9–4.8)
LYMPHOCYTES NFR BLD AUTO: 13.8 % (ref 19.6–45.3)
MCH RBC QN AUTO: 29.9 PG (ref 26.9–32)
MCHC RBC AUTO-ENTMCNC: 31.5 G/DL (ref 32.4–36.3)
MCV RBC AUTO: 94.8 FL (ref 80.5–98.2)
MONOCYTES # BLD AUTO: 0.93 10*3/MM3 (ref 0.2–1.2)
MONOCYTES NFR BLD AUTO: 6.3 % (ref 5–12)
NEUTROPHILS # BLD AUTO: 11.52 10*3/MM3 (ref 1.9–8.1)
NEUTROPHILS NFR BLD AUTO: 78.3 % (ref 42.7–76)
NRBC BLD MANUAL-RTO: 1.6 /100 WBC (ref 0–0)
PLATELET # BLD AUTO: 319 10*3/MM3 (ref 140–500)
PMV BLD AUTO: 10.3 FL (ref 6–12)
RBC # BLD AUTO: 3.45 10*6/MM3 (ref 3.9–5.2)
WBC NRBC COR # BLD: 14.72 10*3/MM3 (ref 4.5–10.7)

## 2017-12-12 PROCEDURE — 97110 THERAPEUTIC EXERCISES: CPT

## 2017-12-12 PROCEDURE — 25010000002 VANCOMYCIN 10 G RECONSTITUTED SOLUTION: Performed by: NURSE PRACTITIONER

## 2017-12-12 PROCEDURE — 99231 SBSQ HOSP IP/OBS SF/LOW 25: CPT | Performed by: INTERNAL MEDICINE

## 2017-12-12 PROCEDURE — 25010000003 CEFAZOLIN IN DEXTROSE 2-4 GM/100ML-% SOLUTION: Performed by: ORTHOPAEDIC SURGERY

## 2017-12-12 PROCEDURE — 97162 PT EVAL MOD COMPLEX 30 MIN: CPT

## 2017-12-12 PROCEDURE — 25810000003 DEXTROSE-NACL PER 500 ML: Performed by: INTERNAL MEDICINE

## 2017-12-12 PROCEDURE — 25010000002 DEXAMETHASONE PER 1 MG: Performed by: INTERNAL MEDICINE

## 2017-12-12 PROCEDURE — 25010000002 ONDANSETRON PER 1 MG: Performed by: ORTHOPAEDIC SURGERY

## 2017-12-12 PROCEDURE — 99024 POSTOP FOLLOW-UP VISIT: CPT | Performed by: NURSE PRACTITIONER

## 2017-12-12 PROCEDURE — 85025 COMPLETE CBC W/AUTO DIFF WBC: CPT | Performed by: INTERNAL MEDICINE

## 2017-12-12 PROCEDURE — 99024 POSTOP FOLLOW-UP VISIT: CPT | Performed by: ORTHOPAEDIC SURGERY

## 2017-12-12 RX ORDER — SODIUM CHLORIDE 9 MG/ML
100 INJECTION, SOLUTION INTRAVENOUS CONTINUOUS
Status: DISCONTINUED | OUTPATIENT
Start: 2017-12-12 | End: 2017-12-12

## 2017-12-12 RX ORDER — DEXTROSE AND SODIUM CHLORIDE 5; .9 G/100ML; G/100ML
100 INJECTION, SOLUTION INTRAVENOUS CONTINUOUS
Status: DISCONTINUED | OUTPATIENT
Start: 2017-12-12 | End: 2017-12-14

## 2017-12-12 RX ADMIN — DEXAMETHASONE SODIUM PHOSPHATE 4 MG: 4 INJECTION, SOLUTION INTRAMUSCULAR; INTRAVENOUS at 13:36

## 2017-12-12 RX ADMIN — DEXTROSE AND SODIUM CHLORIDE 100 ML/HR: 5; 900 INJECTION, SOLUTION INTRAVENOUS at 15:41

## 2017-12-12 RX ADMIN — SODIUM CHLORIDE 1000 ML: 9 INJECTION, SOLUTION INTRAVENOUS at 14:39

## 2017-12-12 RX ADMIN — DEXAMETHASONE SODIUM PHOSPHATE 4 MG: 4 INJECTION, SOLUTION INTRAMUSCULAR; INTRAVENOUS at 23:49

## 2017-12-12 RX ADMIN — CEFAZOLIN SODIUM 2 G: 2 INJECTION, SOLUTION INTRAVENOUS at 04:08

## 2017-12-12 RX ADMIN — DOCUSATE SODIUM -SENNOSIDES 1 TABLET: 50; 8.6 TABLET, COATED ORAL at 09:01

## 2017-12-12 RX ADMIN — DEXAMETHASONE SODIUM PHOSPHATE 4 MG: 4 INJECTION, SOLUTION INTRAMUSCULAR; INTRAVENOUS at 17:50

## 2017-12-12 RX ADMIN — CYCLOBENZAPRINE HYDROCHLORIDE 10 MG: 10 TABLET, FILM COATED ORAL at 22:20

## 2017-12-12 RX ADMIN — ONDANSETRON 4 MG: 2 INJECTION INTRAMUSCULAR; INTRAVENOUS at 11:30

## 2017-12-12 RX ADMIN — VANCOMYCIN HYDROCHLORIDE 1750 MG: 10 INJECTION, POWDER, LYOPHILIZED, FOR SOLUTION INTRAVENOUS at 23:27

## 2017-12-12 RX ADMIN — GABAPENTIN 300 MG: 300 CAPSULE ORAL at 22:20

## 2017-12-12 RX ADMIN — TEMAZEPAM 15 MG: 15 CAPSULE ORAL at 22:20

## 2017-12-12 RX ADMIN — DOCUSATE SODIUM -SENNOSIDES 1 TABLET: 50; 8.6 TABLET, COATED ORAL at 17:50

## 2017-12-12 RX ADMIN — MESALAMINE 1.2 G: 1.2 TABLET, DELAYED RELEASE ORAL at 17:50

## 2017-12-12 RX ADMIN — GABAPENTIN 300 MG: 300 CAPSULE ORAL at 09:02

## 2017-12-13 ENCOUNTER — TELEPHONE (OUTPATIENT)
Dept: ORTHOPEDIC SURGERY | Facility: CLINIC | Age: 57
End: 2017-12-13

## 2017-12-13 LAB
ANION GAP SERPL CALCULATED.3IONS-SCNC: 4.4 MMOL/L
BASOPHILS # BLD AUTO: 0.01 10*3/MM3 (ref 0–0.2)
BASOPHILS NFR BLD AUTO: 0.1 % (ref 0–1.5)
BUN BLD-MCNC: 28 MG/DL (ref 6–20)
BUN/CREAT SERPL: 38.9 (ref 7–25)
CALCIUM SPEC-SCNC: 8 MG/DL (ref 8.6–10.5)
CHLORIDE SERPL-SCNC: 99 MMOL/L (ref 98–107)
CO2 SERPL-SCNC: 28.6 MMOL/L (ref 22–29)
CREAT BLD-MCNC: 0.72 MG/DL (ref 0.57–1)
DEPRECATED RDW RBC AUTO: 49.6 FL (ref 37–54)
EOSINOPHIL # BLD AUTO: 0 10*3/MM3 (ref 0–0.7)
EOSINOPHIL NFR BLD AUTO: 0 % (ref 0.3–6.2)
ERYTHROCYTE [DISTWIDTH] IN BLOOD BY AUTOMATED COUNT: 15.2 % (ref 11.7–13)
GFR SERPL CREATININE-BSD FRML MDRD: 83 ML/MIN/1.73
GLUCOSE BLD-MCNC: 168 MG/DL (ref 65–99)
HCT VFR BLD AUTO: 23.9 % (ref 35.6–45.5)
HCT VFR BLD AUTO: 24.1 % (ref 35.6–45.5)
HGB BLD-MCNC: 7.7 G/DL (ref 11.9–15.5)
HGB BLD-MCNC: 7.7 G/DL (ref 11.9–15.5)
IMM GRANULOCYTES # BLD: 0.14 10*3/MM3 (ref 0–0.03)
IMM GRANULOCYTES NFR BLD: 1.3 % (ref 0–0.5)
LYMPHOCYTES # BLD AUTO: 0.86 10*3/MM3 (ref 0.9–4.8)
LYMPHOCYTES NFR BLD AUTO: 8 % (ref 19.6–45.3)
MCH RBC QN AUTO: 30.3 PG (ref 26.9–32)
MCHC RBC AUTO-ENTMCNC: 32 G/DL (ref 32.4–36.3)
MCV RBC AUTO: 94.9 FL (ref 80.5–98.2)
MONOCYTES # BLD AUTO: 0.46 10*3/MM3 (ref 0.2–1.2)
MONOCYTES NFR BLD AUTO: 4.3 % (ref 5–12)
NEUTROPHILS # BLD AUTO: 9.32 10*3/MM3 (ref 1.9–8.1)
NEUTROPHILS NFR BLD AUTO: 86.3 % (ref 42.7–76)
PLATELET # BLD AUTO: 203 10*3/MM3 (ref 140–500)
PMV BLD AUTO: 10 FL (ref 6–12)
POTASSIUM BLD-SCNC: 4.4 MMOL/L (ref 3.5–5.2)
RBC # BLD AUTO: 2.54 10*6/MM3 (ref 3.9–5.2)
SODIUM BLD-SCNC: 132 MMOL/L (ref 136–145)
WBC NRBC COR # BLD: 10.79 10*3/MM3 (ref 4.5–10.7)

## 2017-12-13 PROCEDURE — 99024 POSTOP FOLLOW-UP VISIT: CPT | Performed by: ORTHOPAEDIC SURGERY

## 2017-12-13 PROCEDURE — 85025 COMPLETE CBC W/AUTO DIFF WBC: CPT | Performed by: INTERNAL MEDICINE

## 2017-12-13 PROCEDURE — 85014 HEMATOCRIT: CPT | Performed by: HOSPITALIST

## 2017-12-13 PROCEDURE — 25010000002 DEXAMETHASONE PER 1 MG: Performed by: INTERNAL MEDICINE

## 2017-12-13 PROCEDURE — 99232 SBSQ HOSP IP/OBS MODERATE 35: CPT | Performed by: INTERNAL MEDICINE

## 2017-12-13 PROCEDURE — 25010000002 VANCOMYCIN 10 G RECONSTITUTED SOLUTION: Performed by: NURSE PRACTITIONER

## 2017-12-13 PROCEDURE — 25010000002 ONDANSETRON PER 1 MG: Performed by: ORTHOPAEDIC SURGERY

## 2017-12-13 PROCEDURE — 97110 THERAPEUTIC EXERCISES: CPT

## 2017-12-13 PROCEDURE — 99231 SBSQ HOSP IP/OBS SF/LOW 25: CPT | Performed by: RADIOLOGY

## 2017-12-13 PROCEDURE — 99024 POSTOP FOLLOW-UP VISIT: CPT | Performed by: PHYSICIAN ASSISTANT

## 2017-12-13 PROCEDURE — 25010000002 DEXAMETHASONE PER 1 MG: Performed by: PHYSICIAN ASSISTANT

## 2017-12-13 PROCEDURE — 80048 BASIC METABOLIC PNL TOTAL CA: CPT | Performed by: INTERNAL MEDICINE

## 2017-12-13 PROCEDURE — 85018 HEMOGLOBIN: CPT | Performed by: HOSPITALIST

## 2017-12-13 RX ORDER — HYDROMORPHONE HYDROCHLORIDE 4 MG/1
4 TABLET ORAL EVERY 4 HOURS PRN
Status: DISCONTINUED | OUTPATIENT
Start: 2017-12-13 | End: 2017-12-16 | Stop reason: HOSPADM

## 2017-12-13 RX ORDER — DEXAMETHASONE SODIUM PHOSPHATE 4 MG/ML
4 INJECTION, SOLUTION INTRA-ARTICULAR; INTRALESIONAL; INTRAMUSCULAR; INTRAVENOUS; SOFT TISSUE EVERY 8 HOURS
Status: DISCONTINUED | OUTPATIENT
Start: 2017-12-13 | End: 2017-12-13

## 2017-12-13 RX ORDER — HYDROMORPHONE HYDROCHLORIDE 4 MG/1
2 TABLET ORAL EVERY 4 HOURS PRN
Status: DISCONTINUED | OUTPATIENT
Start: 2017-12-13 | End: 2017-12-16 | Stop reason: HOSPADM

## 2017-12-13 RX ORDER — SENNA AND DOCUSATE SODIUM 50; 8.6 MG/1; MG/1
2 TABLET, FILM COATED ORAL 2 TIMES DAILY
Status: DISCONTINUED | OUTPATIENT
Start: 2017-12-13 | End: 2017-12-16 | Stop reason: HOSPADM

## 2017-12-13 RX ORDER — DEXAMETHASONE SODIUM PHOSPHATE 4 MG/ML
4 INJECTION, SOLUTION INTRA-ARTICULAR; INTRALESIONAL; INTRAMUSCULAR; INTRAVENOUS; SOFT TISSUE EVERY 12 HOURS
Status: DISCONTINUED | OUTPATIENT
Start: 2017-12-14 | End: 2017-12-15

## 2017-12-13 RX ADMIN — HYDROMORPHONE HYDROCHLORIDE 2 MG: 4 TABLET ORAL at 22:09

## 2017-12-13 RX ADMIN — HYDROMORPHONE HYDROCHLORIDE 2 MG: 4 TABLET ORAL at 12:08

## 2017-12-13 RX ADMIN — MESALAMINE 1.2 G: 1.2 TABLET, DELAYED RELEASE ORAL at 18:23

## 2017-12-13 RX ADMIN — DEXAMETHASONE SODIUM PHOSPHATE 4 MG: 4 INJECTION, SOLUTION INTRAMUSCULAR; INTRAVENOUS at 06:35

## 2017-12-13 RX ADMIN — GABAPENTIN 300 MG: 300 CAPSULE ORAL at 21:56

## 2017-12-13 RX ADMIN — ATORVASTATIN CALCIUM 10 MG: 10 TABLET, FILM COATED ORAL at 09:17

## 2017-12-13 RX ADMIN — CYCLOBENZAPRINE HYDROCHLORIDE 10 MG: 10 TABLET, FILM COATED ORAL at 23:29

## 2017-12-13 RX ADMIN — ONDANSETRON 4 MG: 2 INJECTION INTRAMUSCULAR; INTRAVENOUS at 12:10

## 2017-12-13 RX ADMIN — VANCOMYCIN HYDROCHLORIDE 1500 MG: 10 INJECTION, POWDER, LYOPHILIZED, FOR SOLUTION INTRAVENOUS at 12:07

## 2017-12-13 RX ADMIN — MESALAMINE 1.2 G: 1.2 TABLET, DELAYED RELEASE ORAL at 09:17

## 2017-12-13 RX ADMIN — GABAPENTIN 300 MG: 300 CAPSULE ORAL at 09:17

## 2017-12-13 RX ADMIN — PANTOPRAZOLE SODIUM 40 MG: 40 TABLET, DELAYED RELEASE ORAL at 06:35

## 2017-12-13 RX ADMIN — DOCUSATE SODIUM -SENNOSIDES 3 TABLET: 50; 8.6 TABLET, COATED ORAL at 09:17

## 2017-12-13 RX ADMIN — ONDANSETRON 4 MG: 2 INJECTION INTRAMUSCULAR; INTRAVENOUS at 22:09

## 2017-12-13 RX ADMIN — DEXAMETHASONE SODIUM PHOSPHATE 4 MG: 4 INJECTION, SOLUTION INTRAMUSCULAR; INTRAVENOUS at 16:36

## 2017-12-13 RX ADMIN — VANCOMYCIN HYDROCHLORIDE 1500 MG: 10 INJECTION, POWDER, LYOPHILIZED, FOR SOLUTION INTRAVENOUS at 22:10

## 2017-12-13 RX ADMIN — DOCUSATE SODIUM -SENNOSIDES 2 TABLET: 50; 8.6 TABLET, COATED ORAL at 18:23

## 2017-12-13 RX ADMIN — HYDROMORPHONE HYDROCHLORIDE 2 MG: 4 TABLET ORAL at 16:41

## 2017-12-14 LAB
ABO + RH BLD: NORMAL
ABO + RH BLD: NORMAL
ABO GROUP BLD: NORMAL
BH BB BLOOD EXPIRATION DATE: NORMAL
BH BB BLOOD EXPIRATION DATE: NORMAL
BH BB BLOOD TYPE BARCODE: 6200
BH BB BLOOD TYPE BARCODE: 6200
BH BB DISPENSE STATUS: NORMAL
BH BB DISPENSE STATUS: NORMAL
BH BB PRODUCT CODE: NORMAL
BH BB PRODUCT CODE: NORMAL
BH BB UNIT NUMBER: NORMAL
BH BB UNIT NUMBER: NORMAL
BLD GP AB SCN SERPL QL: NEGATIVE
DEPRECATED RDW RBC AUTO: 51.2 FL (ref 37–54)
ERYTHROCYTE [DISTWIDTH] IN BLOOD BY AUTOMATED COUNT: 15.9 % (ref 11.7–13)
HCT VFR BLD AUTO: 18.8 % (ref 35.6–45.5)
HGB BLD-MCNC: 6.2 G/DL (ref 11.9–15.5)
MCH RBC QN AUTO: 31.2 PG (ref 26.9–32)
MCHC RBC AUTO-ENTMCNC: 33 G/DL (ref 32.4–36.3)
MCV RBC AUTO: 94.5 FL (ref 80.5–98.2)
PLATELET # BLD AUTO: 175 10*3/MM3 (ref 140–500)
PMV BLD AUTO: 9.8 FL (ref 6–12)
RBC # BLD AUTO: 1.99 10*6/MM3 (ref 3.9–5.2)
RH BLD: POSITIVE
UNIT  ABO: NORMAL
UNIT  ABO: NORMAL
UNIT  RH: NORMAL
UNIT  RH: NORMAL
WBC NRBC COR # BLD: 7.82 10*3/MM3 (ref 4.5–10.7)

## 2017-12-14 PROCEDURE — 99024 POSTOP FOLLOW-UP VISIT: CPT | Performed by: NURSE PRACTITIONER

## 2017-12-14 PROCEDURE — 25010000002 FUROSEMIDE PER 20 MG: Performed by: ORTHOPAEDIC SURGERY

## 2017-12-14 PROCEDURE — 36430 TRANSFUSION BLD/BLD COMPNT: CPT

## 2017-12-14 PROCEDURE — 99024 POSTOP FOLLOW-UP VISIT: CPT | Performed by: ORTHOPAEDIC SURGERY

## 2017-12-14 PROCEDURE — 86900 BLOOD TYPING SEROLOGIC ABO: CPT

## 2017-12-14 PROCEDURE — 25010000002 VANCOMYCIN 10 G RECONSTITUTED SOLUTION: Performed by: NURSE PRACTITIONER

## 2017-12-14 PROCEDURE — 86900 BLOOD TYPING SEROLOGIC ABO: CPT | Performed by: INTERNAL MEDICINE

## 2017-12-14 PROCEDURE — 86850 RBC ANTIBODY SCREEN: CPT | Performed by: INTERNAL MEDICINE

## 2017-12-14 PROCEDURE — 97110 THERAPEUTIC EXERCISES: CPT

## 2017-12-14 PROCEDURE — 85027 COMPLETE CBC AUTOMATED: CPT | Performed by: INTERNAL MEDICINE

## 2017-12-14 PROCEDURE — 86901 BLOOD TYPING SEROLOGIC RH(D): CPT | Performed by: INTERNAL MEDICINE

## 2017-12-14 PROCEDURE — 99232 SBSQ HOSP IP/OBS MODERATE 35: CPT | Performed by: INTERNAL MEDICINE

## 2017-12-14 PROCEDURE — 25810000003 DEXTROSE-NACL PER 500 ML: Performed by: INTERNAL MEDICINE

## 2017-12-14 PROCEDURE — P9016 RBC LEUKOCYTES REDUCED: HCPCS

## 2017-12-14 PROCEDURE — 25010000002 ONDANSETRON PER 1 MG: Performed by: ORTHOPAEDIC SURGERY

## 2017-12-14 PROCEDURE — 86923 COMPATIBILITY TEST ELECTRIC: CPT

## 2017-12-14 PROCEDURE — 94799 UNLISTED PULMONARY SVC/PX: CPT

## 2017-12-14 PROCEDURE — 25010000002 DEXAMETHASONE PER 1 MG: Performed by: PHYSICIAN ASSISTANT

## 2017-12-14 RX ORDER — FUROSEMIDE 10 MG/ML
20 INJECTION INTRAMUSCULAR; INTRAVENOUS ONCE
Status: COMPLETED | OUTPATIENT
Start: 2017-12-14 | End: 2017-12-14

## 2017-12-14 RX ADMIN — CYCLOBENZAPRINE HYDROCHLORIDE 10 MG: 10 TABLET, FILM COATED ORAL at 07:18

## 2017-12-14 RX ADMIN — FUROSEMIDE 20 MG: 10 INJECTION, SOLUTION INTRAMUSCULAR; INTRAVENOUS at 16:50

## 2017-12-14 RX ADMIN — MESALAMINE 1.2 G: 1.2 TABLET, DELAYED RELEASE ORAL at 08:11

## 2017-12-14 RX ADMIN — DOCUSATE SODIUM -SENNOSIDES 2 TABLET: 50; 8.6 TABLET, COATED ORAL at 08:11

## 2017-12-14 RX ADMIN — HYDROMORPHONE HYDROCHLORIDE 2 MG: 4 TABLET ORAL at 12:58

## 2017-12-14 RX ADMIN — LACTULOSE 20 G: 20 SOLUTION ORAL at 17:04

## 2017-12-14 RX ADMIN — MESALAMINE 1.2 G: 1.2 TABLET, DELAYED RELEASE ORAL at 17:05

## 2017-12-14 RX ADMIN — LACTULOSE 20 G: 20 SOLUTION ORAL at 08:11

## 2017-12-14 RX ADMIN — HYDROMORPHONE HYDROCHLORIDE 2 MG: 4 TABLET ORAL at 17:05

## 2017-12-14 RX ADMIN — HYDROMORPHONE HYDROCHLORIDE 2 MG: 4 TABLET ORAL at 20:55

## 2017-12-14 RX ADMIN — DEXAMETHASONE SODIUM PHOSPHATE 4 MG: 4 INJECTION, SOLUTION INTRAMUSCULAR; INTRAVENOUS at 16:49

## 2017-12-14 RX ADMIN — GABAPENTIN 300 MG: 300 CAPSULE ORAL at 08:11

## 2017-12-14 RX ADMIN — VANCOMYCIN HYDROCHLORIDE 1500 MG: 10 INJECTION, POWDER, LYOPHILIZED, FOR SOLUTION INTRAVENOUS at 12:10

## 2017-12-14 RX ADMIN — PANTOPRAZOLE SODIUM 40 MG: 40 TABLET, DELAYED RELEASE ORAL at 06:05

## 2017-12-14 RX ADMIN — POLYETHYLENE GLYCOL 3350 17 G: 17 POWDER, FOR SOLUTION ORAL at 08:11

## 2017-12-14 RX ADMIN — ONDANSETRON 4 MG: 2 INJECTION INTRAMUSCULAR; INTRAVENOUS at 12:58

## 2017-12-14 RX ADMIN — ATORVASTATIN CALCIUM 10 MG: 10 TABLET, FILM COATED ORAL at 08:11

## 2017-12-14 RX ADMIN — FENTANYL 1 PATCH: 50 PATCH, EXTENDED RELEASE TRANSDERMAL at 17:52

## 2017-12-14 RX ADMIN — CYCLOBENZAPRINE HYDROCHLORIDE 10 MG: 10 TABLET, FILM COATED ORAL at 17:04

## 2017-12-14 RX ADMIN — DOCUSATE SODIUM -SENNOSIDES 2 TABLET: 50; 8.6 TABLET, COATED ORAL at 17:04

## 2017-12-14 RX ADMIN — HYDROMORPHONE HYDROCHLORIDE 2 MG: 4 TABLET ORAL at 01:33

## 2017-12-14 RX ADMIN — GABAPENTIN 300 MG: 300 CAPSULE ORAL at 20:21

## 2017-12-14 RX ADMIN — HYDROMORPHONE HYDROCHLORIDE 2 MG: 4 TABLET ORAL at 06:05

## 2017-12-14 RX ADMIN — DEXAMETHASONE SODIUM PHOSPHATE 4 MG: 4 INJECTION, SOLUTION INTRAMUSCULAR; INTRAVENOUS at 05:47

## 2017-12-14 RX ADMIN — DEXTROSE AND SODIUM CHLORIDE 100 ML/HR: 5; 900 INJECTION, SOLUTION INTRAVENOUS at 08:11

## 2017-12-15 LAB
ABO + RH BLD: NORMAL
ABO + RH BLD: NORMAL
ALBUMIN SERPL-MCNC: 2.5 G/DL (ref 3.5–5.2)
ALBUMIN/GLOB SERPL: 1 G/DL
ALP SERPL-CCNC: 71 U/L (ref 39–117)
ALT SERPL W P-5'-P-CCNC: 18 U/L (ref 1–33)
ANION GAP SERPL CALCULATED.3IONS-SCNC: 11.6 MMOL/L
AST SERPL-CCNC: 15 U/L (ref 1–32)
BASOPHILS # BLD AUTO: 0.01 10*3/MM3 (ref 0–0.2)
BASOPHILS NFR BLD AUTO: 0.1 % (ref 0–1.5)
BH BB BLOOD EXPIRATION DATE: NORMAL
BH BB BLOOD EXPIRATION DATE: NORMAL
BH BB BLOOD TYPE BARCODE: 6200
BH BB BLOOD TYPE BARCODE: 6200
BH BB DISPENSE STATUS: NORMAL
BH BB DISPENSE STATUS: NORMAL
BH BB PRODUCT CODE: NORMAL
BH BB PRODUCT CODE: NORMAL
BH BB UNIT NUMBER: NORMAL
BH BB UNIT NUMBER: NORMAL
BILIRUB SERPL-MCNC: 0.3 MG/DL (ref 0.1–1.2)
BUN BLD-MCNC: 13 MG/DL (ref 6–20)
BUN/CREAT SERPL: 26.5 (ref 7–25)
CALCIUM SPEC-SCNC: 8.6 MG/DL (ref 8.6–10.5)
CHLORIDE SERPL-SCNC: 104 MMOL/L (ref 98–107)
CO2 SERPL-SCNC: 25.4 MMOL/L (ref 22–29)
CREAT BLD-MCNC: 0.49 MG/DL (ref 0.57–1)
DEPRECATED RDW RBC AUTO: 49.2 FL (ref 37–54)
EOSINOPHIL # BLD AUTO: 0.01 10*3/MM3 (ref 0–0.7)
EOSINOPHIL NFR BLD AUTO: 0.1 % (ref 0.3–6.2)
ERYTHROCYTE [DISTWIDTH] IN BLOOD BY AUTOMATED COUNT: 15.4 % (ref 11.7–13)
GFR SERPL CREATININE-BSD FRML MDRD: 130 ML/MIN/1.73
GLOBULIN UR ELPH-MCNC: 2.5 GM/DL
GLUCOSE BLD-MCNC: 110 MG/DL (ref 65–99)
GLUCOSE BLDC GLUCOMTR-MCNC: 119 MG/DL (ref 70–130)
HCT VFR BLD AUTO: 27.2 % (ref 35.6–45.5)
HGB BLD-MCNC: 8.8 G/DL (ref 11.9–15.5)
IMM GRANULOCYTES # BLD: 0.2 10*3/MM3 (ref 0–0.03)
IMM GRANULOCYTES NFR BLD: 2.6 % (ref 0–0.5)
LYMPHOCYTES # BLD AUTO: 0.95 10*3/MM3 (ref 0.9–4.8)
LYMPHOCYTES NFR BLD AUTO: 12.2 % (ref 19.6–45.3)
MCH RBC QN AUTO: 29.6 PG (ref 26.9–32)
MCHC RBC AUTO-ENTMCNC: 32.4 G/DL (ref 32.4–36.3)
MCV RBC AUTO: 91.6 FL (ref 80.5–98.2)
MONOCYTES # BLD AUTO: 0.48 10*3/MM3 (ref 0.2–1.2)
MONOCYTES NFR BLD AUTO: 6.2 % (ref 5–12)
NEUTROPHILS # BLD AUTO: 6.14 10*3/MM3 (ref 1.9–8.1)
NEUTROPHILS NFR BLD AUTO: 78.8 % (ref 42.7–76)
NRBC BLD MANUAL-RTO: 1.2 /100 WBC (ref 0–0)
PLATELET # BLD AUTO: 197 10*3/MM3 (ref 140–500)
PMV BLD AUTO: 10.1 FL (ref 6–12)
POTASSIUM BLD-SCNC: 3.9 MMOL/L (ref 3.5–5.2)
PROT SERPL-MCNC: 5 G/DL (ref 6–8.5)
RBC # BLD AUTO: 2.97 10*6/MM3 (ref 3.9–5.2)
SODIUM BLD-SCNC: 141 MMOL/L (ref 136–145)
UNIT  ABO: NORMAL
UNIT  ABO: NORMAL
UNIT  RH: NORMAL
UNIT  RH: NORMAL
WBC NRBC COR # BLD: 7.79 10*3/MM3 (ref 4.5–10.7)

## 2017-12-15 PROCEDURE — 25010000002 DEXAMETHASONE PER 1 MG: Performed by: PHYSICIAN ASSISTANT

## 2017-12-15 PROCEDURE — 25010000002 ENOXAPARIN PER 10 MG: Performed by: INTERNAL MEDICINE

## 2017-12-15 PROCEDURE — 85025 COMPLETE CBC W/AUTO DIFF WBC: CPT | Performed by: INTERNAL MEDICINE

## 2017-12-15 PROCEDURE — 99232 SBSQ HOSP IP/OBS MODERATE 35: CPT | Performed by: INTERNAL MEDICINE

## 2017-12-15 PROCEDURE — 63710000001 METHYLPREDNISOLONE 4 MG TABLET THERAPY PACK 21 EACH DISP PACK: Performed by: NURSE PRACTITIONER

## 2017-12-15 PROCEDURE — 80053 COMPREHEN METABOLIC PANEL: CPT | Performed by: INTERNAL MEDICINE

## 2017-12-15 PROCEDURE — 97110 THERAPEUTIC EXERCISES: CPT

## 2017-12-15 PROCEDURE — 99024 POSTOP FOLLOW-UP VISIT: CPT | Performed by: ORTHOPAEDIC SURGERY

## 2017-12-15 PROCEDURE — 99024 POSTOP FOLLOW-UP VISIT: CPT | Performed by: NURSE PRACTITIONER

## 2017-12-15 PROCEDURE — 82962 GLUCOSE BLOOD TEST: CPT

## 2017-12-15 RX ORDER — METHYLPREDNISOLONE 4 MG/1
8 TABLET ORAL
Status: COMPLETED | OUTPATIENT
Start: 2017-12-15 | End: 2017-12-15

## 2017-12-15 RX ORDER — HYDROMORPHONE HYDROCHLORIDE 4 MG/1
4 TABLET ORAL EVERY 4 HOURS PRN
Qty: 20 TABLET | Refills: 0 | Status: SHIPPED | OUTPATIENT
Start: 2017-12-15 | End: 2018-03-17

## 2017-12-15 RX ORDER — FENTANYL 50 UG/H
1 PATCH TRANSDERMAL
Qty: 3 PATCH | Refills: 0 | Status: SHIPPED | OUTPATIENT
Start: 2017-12-15 | End: 2018-02-06

## 2017-12-15 RX ORDER — METHYLPREDNISOLONE 4 MG/1
4 TABLET ORAL
Status: DISCONTINUED | OUTPATIENT
Start: 2017-12-16 | End: 2017-12-16

## 2017-12-15 RX ORDER — METHYLPREDNISOLONE 4 MG/1
TABLET ORAL
Start: 2017-12-15 | End: 2017-12-16 | Stop reason: HOSPADM

## 2017-12-15 RX ORDER — METHYLPREDNISOLONE 4 MG/1
8 TABLET ORAL
Status: DISCONTINUED | OUTPATIENT
Start: 2017-12-16 | End: 2017-12-16

## 2017-12-15 RX ORDER — TRAMADOL HYDROCHLORIDE 50 MG/1
50 TABLET ORAL EVERY 8 HOURS PRN
Qty: 30 TABLET | Refills: 0 | Status: SHIPPED | OUTPATIENT
Start: 2017-12-15 | End: 2018-07-31 | Stop reason: SDUPTHER

## 2017-12-15 RX ORDER — METHYLPREDNISOLONE 4 MG/1
4 TABLET ORAL 2 TIMES DAILY
Status: DISCONTINUED | OUTPATIENT
Start: 2017-12-19 | End: 2017-12-16

## 2017-12-15 RX ORDER — METHYLPREDNISOLONE 4 MG/1
4 TABLET ORAL
Status: DISCONTINUED | OUTPATIENT
Start: 2017-12-18 | End: 2017-12-16

## 2017-12-15 RX ORDER — METHYLPREDNISOLONE 4 MG/1
4 TABLET ORAL
Status: COMPLETED | OUTPATIENT
Start: 2017-12-15 | End: 2017-12-15

## 2017-12-15 RX ORDER — METHYLPREDNISOLONE 4 MG/1
4 TABLET ORAL
Status: DISCONTINUED | OUTPATIENT
Start: 2017-12-20 | End: 2017-12-16

## 2017-12-15 RX ORDER — METHYLPREDNISOLONE 4 MG/1
4 TABLET ORAL
Status: DISCONTINUED | OUTPATIENT
Start: 2017-12-17 | End: 2017-12-16

## 2017-12-15 RX ORDER — SENNA AND DOCUSATE SODIUM 50; 8.6 MG/1; MG/1
2 TABLET, FILM COATED ORAL 2 TIMES DAILY
Qty: 90 TABLET | Refills: 2 | Status: SHIPPED | OUTPATIENT
Start: 2017-12-15 | End: 2022-08-08

## 2017-12-15 RX ORDER — LACTULOSE 10 G/15ML
20 SOLUTION ORAL 2 TIMES DAILY
Qty: 1892 ML | Refills: 2 | Status: SHIPPED | OUTPATIENT
Start: 2017-12-15 | End: 2018-07-11 | Stop reason: SINTOL

## 2017-12-15 RX ADMIN — METHYLPREDNISOLONE 4 MG: 4 TABLET ORAL at 18:18

## 2017-12-15 RX ADMIN — LACTULOSE 20 G: 20 SOLUTION ORAL at 09:09

## 2017-12-15 RX ADMIN — LACTULOSE 20 G: 20 SOLUTION ORAL at 18:17

## 2017-12-15 RX ADMIN — METHYLPREDNISOLONE 4 MG: 4 TABLET ORAL at 14:28

## 2017-12-15 RX ADMIN — DEXAMETHASONE SODIUM PHOSPHATE 4 MG: 4 INJECTION, SOLUTION INTRAMUSCULAR; INTRAVENOUS at 05:06

## 2017-12-15 RX ADMIN — MESALAMINE 1.2 G: 1.2 TABLET, DELAYED RELEASE ORAL at 18:18

## 2017-12-15 RX ADMIN — ATORVASTATIN CALCIUM 10 MG: 10 TABLET, FILM COATED ORAL at 09:05

## 2017-12-15 RX ADMIN — GABAPENTIN 300 MG: 300 CAPSULE ORAL at 09:04

## 2017-12-15 RX ADMIN — HYDROMORPHONE HYDROCHLORIDE 4 MG: 4 TABLET ORAL at 05:06

## 2017-12-15 RX ADMIN — HYDROMORPHONE HYDROCHLORIDE 2 MG: 4 TABLET ORAL at 01:04

## 2017-12-15 RX ADMIN — ENOXAPARIN SODIUM 40 MG: 40 INJECTION SUBCUTANEOUS at 14:28

## 2017-12-15 RX ADMIN — DOCUSATE SODIUM -SENNOSIDES 2 TABLET: 50; 8.6 TABLET, COATED ORAL at 18:18

## 2017-12-15 RX ADMIN — MESALAMINE 1.2 G: 1.2 TABLET, DELAYED RELEASE ORAL at 09:05

## 2017-12-15 RX ADMIN — METHYLPREDNISOLONE 8 MG: 4 TABLET ORAL at 20:53

## 2017-12-15 RX ADMIN — DOCUSATE SODIUM -SENNOSIDES 2 TABLET: 50; 8.6 TABLET, COATED ORAL at 09:05

## 2017-12-15 RX ADMIN — GABAPENTIN 300 MG: 300 CAPSULE ORAL at 20:53

## 2017-12-15 RX ADMIN — PANTOPRAZOLE SODIUM 40 MG: 40 TABLET, DELAYED RELEASE ORAL at 06:38

## 2017-12-15 RX ADMIN — CYCLOBENZAPRINE HYDROCHLORIDE 10 MG: 10 TABLET, FILM COATED ORAL at 05:05

## 2017-12-15 RX ADMIN — METHYLPREDNISOLONE 8 MG: 4 TABLET ORAL at 14:29

## 2017-12-15 RX ADMIN — ENOXAPARIN SODIUM 40 MG: 40 INJECTION SUBCUTANEOUS at 20:54

## 2017-12-16 VITALS
DIASTOLIC BLOOD PRESSURE: 81 MMHG | HEIGHT: 61 IN | TEMPERATURE: 98.3 F | WEIGHT: 193.7 LBS | SYSTOLIC BLOOD PRESSURE: 128 MMHG | HEART RATE: 87 BPM | BODY MASS INDEX: 36.57 KG/M2 | RESPIRATION RATE: 18 BRPM | OXYGEN SATURATION: 97 %

## 2017-12-16 LAB
ALBUMIN SERPL-MCNC: 2.9 G/DL (ref 3.5–5.2)
ALBUMIN/GLOB SERPL: 1 G/DL
ALP SERPL-CCNC: 94 U/L (ref 39–117)
ALT SERPL W P-5'-P-CCNC: 18 U/L (ref 1–33)
ANION GAP SERPL CALCULATED.3IONS-SCNC: 13 MMOL/L
AST SERPL-CCNC: 13 U/L (ref 1–32)
BILIRUB SERPL-MCNC: 0.3 MG/DL (ref 0.1–1.2)
BUN BLD-MCNC: 18 MG/DL (ref 6–20)
BUN/CREAT SERPL: 31 (ref 7–25)
CALCIUM SPEC-SCNC: 9.3 MG/DL (ref 8.6–10.5)
CHLORIDE SERPL-SCNC: 100 MMOL/L (ref 98–107)
CO2 SERPL-SCNC: 27 MMOL/L (ref 22–29)
CREAT BLD-MCNC: 0.58 MG/DL (ref 0.57–1)
DEPRECATED RDW RBC AUTO: 49.1 FL (ref 37–54)
ERYTHROCYTE [DISTWIDTH] IN BLOOD BY AUTOMATED COUNT: 15.6 % (ref 11.7–13)
GFR SERPL CREATININE-BSD FRML MDRD: 107 ML/MIN/1.73
GLOBULIN UR ELPH-MCNC: 3 GM/DL
GLUCOSE BLD-MCNC: 141 MG/DL (ref 65–99)
HCT VFR BLD AUTO: 34.8 % (ref 35.6–45.5)
HGB BLD-MCNC: 11.5 G/DL (ref 11.9–15.5)
LYMPHOCYTES # BLD MANUAL: 2.03 10*3/MM3 (ref 0.9–4.8)
LYMPHOCYTES NFR BLD MANUAL: 22 % (ref 19.6–45.3)
LYMPHOCYTES NFR BLD MANUAL: 3 % (ref 5–12)
MCH RBC QN AUTO: 30.5 PG (ref 26.9–32)
MCHC RBC AUTO-ENTMCNC: 33 G/DL (ref 32.4–36.3)
MCV RBC AUTO: 92.3 FL (ref 80.5–98.2)
MONOCYTES # BLD AUTO: 0.28 10*3/MM3 (ref 0.2–1.2)
NEUTROPHILS # BLD AUTO: 6.92 10*3/MM3 (ref 1.9–8.1)
NEUTROPHILS NFR BLD MANUAL: 75 % (ref 42.7–76)
NRBC SPEC MANUAL: 2 /100 WBC (ref 0–0)
PLAT MORPH BLD: NORMAL
PLATELET # BLD AUTO: 257 10*3/MM3 (ref 140–500)
PMV BLD AUTO: 9.8 FL (ref 6–12)
POTASSIUM BLD-SCNC: 3.4 MMOL/L (ref 3.5–5.2)
PROT SERPL-MCNC: 5.9 G/DL (ref 6–8.5)
RBC # BLD AUTO: 3.77 10*6/MM3 (ref 3.9–5.2)
RBC MORPH BLD: NORMAL
SODIUM BLD-SCNC: 140 MMOL/L (ref 136–145)
WBC MORPH BLD: NORMAL
WBC NRBC COR # BLD: 9.23 10*3/MM3 (ref 4.5–10.7)

## 2017-12-16 PROCEDURE — 85027 COMPLETE CBC AUTOMATED: CPT | Performed by: INTERNAL MEDICINE

## 2017-12-16 PROCEDURE — 63710000001 METHYLPREDNISOLONE 4 MG TABLET THERAPY PACK 21 EACH DISP PACK: Performed by: NURSE PRACTITIONER

## 2017-12-16 PROCEDURE — 85007 BL SMEAR W/DIFF WBC COUNT: CPT | Performed by: INTERNAL MEDICINE

## 2017-12-16 PROCEDURE — 80053 COMPREHEN METABOLIC PANEL: CPT | Performed by: INTERNAL MEDICINE

## 2017-12-16 PROCEDURE — 99232 SBSQ HOSP IP/OBS MODERATE 35: CPT | Performed by: INTERNAL MEDICINE

## 2017-12-16 PROCEDURE — 25010000002 ENOXAPARIN PER 10 MG: Performed by: INTERNAL MEDICINE

## 2017-12-16 RX ORDER — DEXAMETHASONE 4 MG/1
8 TABLET ORAL 3 TIMES DAILY
Qty: 30 TABLET | Refills: 1
Start: 2017-12-16 | End: 2018-01-11 | Stop reason: HOSPADM

## 2017-12-16 RX ORDER — DEXAMETHASONE 4 MG/1
8 TABLET ORAL 3 TIMES DAILY
Status: DISCONTINUED | OUTPATIENT
Start: 2017-12-16 | End: 2017-12-16 | Stop reason: HOSPADM

## 2017-12-16 RX ORDER — DEXAMETHASONE 4 MG/1
4 TABLET ORAL 3 TIMES DAILY
Status: DISCONTINUED | OUTPATIENT
Start: 2017-12-16 | End: 2017-12-16

## 2017-12-16 RX ORDER — POTASSIUM CHLORIDE 750 MG/1
40 CAPSULE, EXTENDED RELEASE ORAL ONCE
Status: COMPLETED | OUTPATIENT
Start: 2017-12-16 | End: 2017-12-16

## 2017-12-16 RX ADMIN — HYDROMORPHONE HYDROCHLORIDE 2 MG: 4 TABLET ORAL at 17:59

## 2017-12-16 RX ADMIN — POTASSIUM CHLORIDE 40 MEQ: 750 CAPSULE, EXTENDED RELEASE ORAL at 16:58

## 2017-12-16 RX ADMIN — MESALAMINE 1.2 G: 1.2 TABLET, DELAYED RELEASE ORAL at 17:59

## 2017-12-16 RX ADMIN — ATORVASTATIN CALCIUM 10 MG: 10 TABLET, FILM COATED ORAL at 08:20

## 2017-12-16 RX ADMIN — GABAPENTIN 300 MG: 300 CAPSULE ORAL at 08:20

## 2017-12-16 RX ADMIN — ENOXAPARIN SODIUM 90 MG: 100 INJECTION SUBCUTANEOUS at 10:10

## 2017-12-16 RX ADMIN — DOCUSATE SODIUM -SENNOSIDES 2 TABLET: 50; 8.6 TABLET, COATED ORAL at 17:59

## 2017-12-16 RX ADMIN — DOCUSATE SODIUM -SENNOSIDES 2 TABLET: 50; 8.6 TABLET, COATED ORAL at 08:21

## 2017-12-16 RX ADMIN — PANTOPRAZOLE SODIUM 40 MG: 40 TABLET, DELAYED RELEASE ORAL at 08:18

## 2017-12-16 RX ADMIN — METHYLPREDNISOLONE 4 MG: 4 TABLET ORAL at 08:20

## 2017-12-16 RX ADMIN — METHYLPREDNISOLONE 4 MG: 4 TABLET ORAL at 12:33

## 2017-12-16 RX ADMIN — MESALAMINE 1.2 G: 1.2 TABLET, DELAYED RELEASE ORAL at 08:20

## 2017-12-18 ENCOUNTER — EPISODE CHANGES (OUTPATIENT)
Dept: CASE MANAGEMENT | Facility: OTHER | Age: 57
End: 2017-12-18

## 2017-12-18 ENCOUNTER — PATIENT OUTREACH (OUTPATIENT)
Dept: CASE MANAGEMENT | Facility: OTHER | Age: 57
End: 2017-12-18

## 2017-12-20 ENCOUNTER — TELEPHONE (OUTPATIENT)
Dept: ORTHOPEDIC SURGERY | Facility: CLINIC | Age: 57
End: 2017-12-20

## 2017-12-20 ENCOUNTER — PATIENT OUTREACH (OUTPATIENT)
Dept: CASE MANAGEMENT | Facility: OTHER | Age: 57
End: 2017-12-20

## 2017-12-20 LAB
CYTO UR: NORMAL
LAB AP CASE REPORT: NORMAL
Lab: NORMAL
PATH REPORT.ADDENDUM SPEC: NORMAL
PATH REPORT.FINAL DX SPEC: NORMAL
PATH REPORT.GROSS SPEC: NORMAL

## 2017-12-20 NOTE — TELEPHONE ENCOUNTER
Patients nurse has been informed to start patient on Clindamycin 600 mg, every 8 hrs, x 7 days, per JUS. Patient has appt in the am.cld

## 2017-12-20 NOTE — TELEPHONE ENCOUNTER
Have them start Clindamycin 600 mg by mouth every 8 hours, and have her follow-up with Dr. Shanks first thing in the morning.  Thank you.

## 2017-12-21 ENCOUNTER — OFFICE VISIT (OUTPATIENT)
Dept: ORTHOPEDIC SURGERY | Facility: CLINIC | Age: 57
End: 2017-12-21

## 2017-12-21 VITALS — HEIGHT: 61 IN | TEMPERATURE: 97.4 F | BODY MASS INDEX: 36.63 KG/M2 | WEIGHT: 194 LBS

## 2017-12-21 DIAGNOSIS — Z98.890 S/P ORIF (OPEN REDUCTION INTERNAL FIXATION) FRACTURE: Primary | ICD-10-CM

## 2017-12-21 DIAGNOSIS — Z87.81 S/P ORIF (OPEN REDUCTION INTERNAL FIXATION) FRACTURE: Primary | ICD-10-CM

## 2017-12-21 PROCEDURE — 99024 POSTOP FOLLOW-UP VISIT: CPT | Performed by: NURSE PRACTITIONER

## 2017-12-21 PROCEDURE — 73590 X-RAY EXAM OF LOWER LEG: CPT | Performed by: NURSE PRACTITIONER

## 2017-12-21 RX ORDER — SACCHAROMYCES BOULARDII 250 MG
250 CAPSULE ORAL 2 TIMES DAILY
Qty: 20 CAPSULE | Refills: 0
Start: 2017-12-21 | End: 2017-12-31

## 2017-12-21 NOTE — TELEPHONE ENCOUNTER
"Per MJR:: \"Saw patient in the office today and need to add one more medication.  Please call [facility] with an order for Floristor probiotic po bid x 10 days while she is on the antibiotic.  Thanks MJR\"    Called [facility] @ [phone] and placed verbal orders.   "

## 2017-12-21 NOTE — PROGRESS NOTES
Martha Davey : 1960 MRN: [id number] DATE: 2017  Body mass index is 36.66 kg/(m^2).  Vitals:    17 1140   Temp: 97.4 °F (36.3 °C)       DIAGNOSIS: 2 week follow up TIBIA INTRAMEDULLARY NAIL/DON INSERTION - Left    SUBJECTIVE:Patient returns today for 2 week follow up ofTIBIA INTRAMEDULLARY NAIL/DON INSERTION - Left. Patient reports doing well with no unusual complaints. Appears to be progressing appropriately.    OBJECTIVE:   Exam:. The incision is healing appropriately. No sign of infection. Range of motion is progressing as expected. The calf is soft and nontender with a negative Homans sign. Purple area quarter size on heel from pressure.      DIAGNOSTIC STUDIES  Xrays: 2 views of the left knee (AP full length and lateral) were ordered and images were reviewed for evaluation of recent TIBIA INTRAMEDULLARY NAIL/DON INSERTION - Left. They demonstrate a well positioned, well aligned fracture with hardware without complicating factors noted. In comparison with previous films there has been interval implant placement.    ASSESSMENT: 2 week status postTIBIA INTRAMEDULLARY NAIL/DON INSERTION - Left expected healing.    PLAN: 1) Staples removed and steri strips applied   2) Continue PT and pain medicine (as needed)   3) Continue NITISH hose for four weeks   4) Continue ice PRN   5) Waffle boot for left heel, wear brace and float heel but keep all pressure off heel especially while sleeping.    6) Follow up in 4 weeks with repeat Xrays of left knee (2 views)   7) Continue with antibiotic until all drainage has stopped.    8) Add BID probiotic while on antibiotic therapy.   Nuzhat Easton, APRN  2017

## 2017-12-26 ENCOUNTER — OFFICE VISIT (OUTPATIENT)
Dept: ONCOLOGY | Facility: CLINIC | Age: 57
End: 2017-12-26

## 2017-12-26 ENCOUNTER — LAB (OUTPATIENT)
Dept: LAB | Facility: HOSPITAL | Age: 57
End: 2017-12-26

## 2017-12-26 ENCOUNTER — TELEPHONE (OUTPATIENT)
Dept: ONCOLOGY | Facility: HOSPITAL | Age: 57
End: 2017-12-26

## 2017-12-26 VITALS
HEART RATE: 115 BPM | DIASTOLIC BLOOD PRESSURE: 62 MMHG | SYSTOLIC BLOOD PRESSURE: 88 MMHG | RESPIRATION RATE: 18 BRPM | TEMPERATURE: 98.3 F | OXYGEN SATURATION: 99 %

## 2017-12-26 DIAGNOSIS — G60.0 HEREDITARY SENSORIMOTOR NEUROPATHY: Primary | ICD-10-CM

## 2017-12-26 DIAGNOSIS — Z86.711 HISTORY OF PULMONARY EMBOLISM: ICD-10-CM

## 2017-12-26 DIAGNOSIS — C50.811 MALIGNANT NEOPLASM OF OVERLAPPING SITES OF RIGHT BREAST IN FEMALE, ESTROGEN RECEPTOR NEGATIVE (HCC): ICD-10-CM

## 2017-12-26 DIAGNOSIS — K59.03 DRUG INDUCED CONSTIPATION: Primary | ICD-10-CM

## 2017-12-26 DIAGNOSIS — Z17.1 MALIGNANT NEOPLASM OF OVERLAPPING SITES OF RIGHT BREAST IN FEMALE, ESTROGEN RECEPTOR NEGATIVE (HCC): ICD-10-CM

## 2017-12-26 DIAGNOSIS — G95.20 CORD COMPRESSION (HCC): ICD-10-CM

## 2017-12-26 DIAGNOSIS — G60.0 HEREDITARY SENSORIMOTOR NEUROPATHY: ICD-10-CM

## 2017-12-26 LAB
BASOPHILS # BLD AUTO: 0.03 10*3/MM3 (ref 0–0.1)
BASOPHILS NFR BLD AUTO: 0.4 % (ref 0–1.1)
DEPRECATED RDW RBC AUTO: 54.1 FL (ref 37–49)
EOSINOPHIL # BLD AUTO: 0.08 10*3/MM3 (ref 0–0.36)
EOSINOPHIL NFR BLD AUTO: 1 % (ref 1–5)
ERYTHROCYTE [DISTWIDTH] IN BLOOD BY AUTOMATED COUNT: 17.7 % (ref 11.7–14.5)
HCT VFR BLD AUTO: 32.8 % (ref 34–45)
HGB BLD-MCNC: 11.1 G/DL (ref 11.5–14.9)
IMM GRANULOCYTES # BLD: 0.17 10*3/MM3 (ref 0–0.03)
IMM GRANULOCYTES NFR BLD: 2.2 % (ref 0–0.5)
INR PPP: 1.1 (ref 0.9–1.1)
LYMPHOCYTES # BLD AUTO: 0.89 10*3/MM3 (ref 1–3.5)
LYMPHOCYTES NFR BLD AUTO: 11.4 % (ref 20–49)
MCH RBC QN AUTO: 31.4 PG (ref 27–33)
MCHC RBC AUTO-ENTMCNC: 33.8 G/DL (ref 32–35)
MCV RBC AUTO: 92.7 FL (ref 83–97)
MONOCYTES # BLD AUTO: 0.42 10*3/MM3 (ref 0.25–0.8)
MONOCYTES NFR BLD AUTO: 5.4 % (ref 4–12)
NEUTROPHILS # BLD AUTO: 6.24 10*3/MM3 (ref 1.5–7)
NEUTROPHILS NFR BLD AUTO: 79.6 % (ref 39–75)
NRBC BLD MANUAL-RTO: 1.1 /100 WBC (ref 0–0)
PLATELET # BLD AUTO: 339 10*3/MM3 (ref 150–375)
PMV BLD AUTO: 9.6 FL (ref 8.9–12.1)
PROTHROMBIN TIME: 13 SECONDS (ref 11–13.5)
RBC # BLD AUTO: 3.54 10*6/MM3 (ref 3.9–5)
WBC NRBC COR # BLD: 7.83 10*3/MM3 (ref 4–10)

## 2017-12-26 PROCEDURE — 85610 PROTHROMBIN TIME: CPT

## 2017-12-26 PROCEDURE — 36415 COLL VENOUS BLD VENIPUNCTURE: CPT | Performed by: INTERNAL MEDICINE

## 2017-12-26 PROCEDURE — 99215 OFFICE O/P EST HI 40 MIN: CPT | Performed by: INTERNAL MEDICINE

## 2017-12-26 PROCEDURE — 85025 COMPLETE CBC W/AUTO DIFF WBC: CPT

## 2017-12-26 NOTE — TELEPHONE ENCOUNTER
Called in orders to [facility] for patient to be back on nystatin 5cc swish and swallow QID per  request.

## 2017-12-27 ENCOUNTER — PATIENT OUTREACH (OUTPATIENT)
Dept: CASE MANAGEMENT | Facility: OTHER | Age: 57
End: 2017-12-27

## 2017-12-27 NOTE — PROGRESS NOTES
Subjective .     REASONS FOR FOLLOWUP:    1. Stage Ib ([id number]) right breast cancer: Diagnosed May 2010 with excisional biopsy for invasive ductal carcinoma, 1.3 cm, grade 2, ER 90%, FL 80%, HER-2/peter negative (1+ IHC).  Subsequent right mastectomy in July 2010 with no residual breast malignancy, 1/5 sentinel lymph node with micrometastasis (0.25 mm).  Treated in the [facility] system with adjuvant AC ×4 cycles in 2010 (no taxanes administered due to underlying Charcot-Saloni-Tooth with peripheral neuropathy).  Adjuvant Femara (postmenopausal) initiated October 2010 with plan to treat ×10 years.  Genetic testing reportedly negative.  Developed osteopenia treated with Prolia beginning 2/27/13.  2. Recurrent/metastatic disease identified 10/8/17 involving thoracic spine with cord compression at T6, lumbosacral involvement, sternal and right sternoclavicular involvement.  Femara discontinued.  Radiation administered (in the [facility] system) to the thoracic spine beginning 10/19/17 treating T3-T9 to a dose of 24 gray in 6 fractions.  3. Evaluation with MRI 12/8/17 showing persistent T6 cord compression with persistent neurologic compromise requiring surgical treatment 12/11/17 with T6 laminectomy/corpectomy and T3-T9 fusion.  Pathology with metastatic carcinoma of breast origin, ER negative, FL negative, HER-2/peter negative (1+ IHC).  4. Right pulmonary embolism 10/21/17 continuing on Lovenox 1 mg/kg (100 mg) every 12 hours.  No evidence of DVT.  5. Cancer related pain on Duragesic 50 µg patch every 72 hours and Dilaudid 4 mg as needed for breakthrough pain.    HISTORY OF PRESENT ILLNESS:  The patient is a 57 y.o. year old female who is here for follow-up with the above-mentioned history.    History of Present Illness   the patient returns in follow-up today after having previously been seen during her recent hospitalization during which time she required surgical intervention for her T6 cord compression on 12/11/17 with  T6 laminectomy/corpectomy and T3-T9 fusion.  She has transferred her oncologic care to our practice at this point.  She was seen also by Dr. Mehta in radiation oncology with plans to treat a dural based lesion at L3 as well as a left humeral metastasis with radiation therapy once she recovers from her back surgery.  We are reviewing her pathologic results to determine her systemic therapy options.  In the interval, she has been residing at [facility] for subacute rehabilitation.  Her pain is well-controlled on her Duragesic 50 µg patch.  She is requiring Dilaudid 4 mg usually once per day prior to her physical therapy session.  She has made minimal progress with physical therapy, able to stand for 3-4 minutes approximately 3 times per day.  She does perform in bed exercises with her legs and arms.  She remains in a wheelchair today with a brace in place.  She developed some drainage from the screw in her left lower leg and was started on clindamycin 12/20/17 with 1 day remaining.  The drainage has improved and nearly resolved.  She has not developed any fevers.  She notes a fairly normal appetite.  Her bowels are moving although she does have some difficulty with sensation and control.  She does continue with fatigue.  She has been tapered off of her steroids since discharge and nystatin was discontinued at the time of discharge with resolution of her thrush.    Past Medical History:   Diagnosis Date   • Acute pulmonary embolism, cancer-related 10/2017   • Allergic rhinitis    • Arthritis     Right knee; left shoulder   • Cancer 05/2010    Right breast   • Cancer 10/2017    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   • Charcot-Saloni-Tooth disease    • CPAP (continuous positive airway pressure) dependence    • GERD (gastroesophageal reflux disease)    • H/O Colon polyps    • H/O Uterine fibroid    • Heart murmur    • Hyperlipidemia    • Hypertension    • PONV (postoperative nausea and vomiting)      Past Surgical  History:   Procedure Laterality Date   • BLADDER SURGERY      Bladder lift   • BREAST RECONSTRUCTION, BREAST TISSUE EXPANDER INSERTION Right 2010   • BREAST SURGERY Right 2010    Mastectomy   •  SECTION  ,    • CHOLECYSTECTOMY     • COLONOSCOPY     • HERNIA REPAIR  2015    [facility]   • HYSTERECTOMY      Partial   • KNEE SURGERY Right    • LEG SURGERY Left     Broken   • MASTECTOMY Right    • PA TREAT TIBIAL SHAFT FX, INTRAMED IMPLANT Left 2017    Procedure: TIBIA INTRAMEDULLARY NAIL/DON INSERTION;  Surgeon: Romero Shanks MD;  Location: [facility];  Service: Orthopedics   • THORACIC DECOMPRESSION POSTERIOR FUSION WITH INSTRUMENTATION N/A 2017    Procedure: T6 costotransversectomy and decompression with T3 to  T9 posterior spinal fusion with instrumentation with Jennifer Gonzalez and Nael Wilkes;  Surgeon: Quan Nayak MD;  Location: [facility];  Service:        ONCOLOGIC HISTORY:  (History from previous dates can be found in the separate document.)    Current Outpatient Prescriptions on File Prior to Visit   Medication Sig Dispense Refill   • acetaminophen (TYLENOL) 500 MG tablet Take 500 mg by mouth Every 6 (Six) Hours As Needed for Mild Pain .     • atorvastatin (LIPITOR) 10 MG tablet Take 1 tablet by mouth Daily. 90 tablet 3   • cyclobenzaprine (FLEXERIL) 10 MG tablet Take 1 tablet by mouth 3 (Three) Times a Day As Needed for Muscle Spasms. 30 tablet 3   • dexamethasone (DECADRON) 4 MG tablet Take 2 tablets by mouth 3 (Three) Times a Day. 30 tablet 1   • enoxaparin (LOVENOX) 100 MG/ML solution syringe Inject 1 mg/kg under the skin Every 12 (Twelve) Hours.     • fentaNYL (DURAGESIC) 50 MCG/HR patch Place 1 patch on the skin Every 72 (Seventy-Two) Hours. 3 patch 0   • FLONASE ALLERGY RELIEF 50 MCG/ACT nasal spray 2 sprays into each nostril daily.  11   • gabapentin (NEURONTIN) 300 MG capsule Take 1 capsule by mouth 3 (Three) Times a Day. 90 capsule 2   •  glycerin (ADULT) 2 g suppository rectal suppository Insert 2 g into the rectum.     • HYDROmorphone (DILAUDID) 4 MG tablet Take 1 tablet by mouth Every 4 (Four) Hours As Needed for Moderate Pain . 20 tablet 0   • lactulose (CHRONULAC) 10 GM/15ML solution Take 30 mL by mouth 2 (Two) Times a Day. 1892 mL 2   • mesalamine (LIALDA) 1.2 G EC tablet Take 1,200 mg by mouth 2 (two) times a day.     • omeprazole (PriLOSEC) 40 MG capsule TAKE 1 CAPSULE DAILY 90 capsule 2   • ondansetron ODT (ZOFRAN-ODT) 4 MG disintegrating tablet DIS 1 T ON THE TONGUE Q 8 H PRF NAUSEA  0   • polyethylene glycol (MIRALAX) packet Take 17 g by mouth Daily.     • psyllium (METAMUCIL) 58.6 % packet Take 1 packet by mouth Daily.     • saccharomyces boulardii (FLORASTOR) 250 MG capsule Take 1 capsule by mouth 2 (Two) Times a Day for 10 days. 20 capsule 0   • sennosides-docusate sodium (SENOKOT-S) 8.6-50 MG tablet Take 2 tablets by mouth 2 (Two) Times a Day. 90 tablet 2   • traMADol (ULTRAM) 50 MG tablet Take 1 tablet by mouth Every 8 (Eight) Hours As Needed for Moderate Pain . 30 tablet 0     No current facility-administered medications on file prior to visit.        ALLERGIES:     Allergies   Allergen Reactions   • Hydrocodone Nausea Only   • Morphine And Related Nausea And Vomiting     Social History     Social History   • Marital status:      Spouse name: N/A   • Number of children: 3   • Years of education: N/A     Occupational History   •  Retired     Social History Main Topics   • Smoking status: Never Smoker   • Smokeless tobacco: Never Used   • Alcohol use Yes      Comment: social   • Drug use: No   • Sexual activity: Defer     Other Topics Concern   • Not on file     Social History Narrative     Family History   Problem Relation Age of Onset   • Heart disease Mother    • Hyperlipidemia Mother    • Hypertension Mother    • Diabetes Father    • Charcot-Saloni-Tooth disease Father    • Heart disease Father    • Hypertension Father    •  Heart failure Father    • Diabetes Sister    • Heart disease Sister    • Heart disease Maternal Aunt    • Scoliosis Maternal Aunt    • Heart disease Maternal Uncle    • Diabetes Maternal Grandmother    • Heart disease Maternal Grandmother    • Hypertension Maternal Grandmother    • Uterine cancer Maternal Grandmother    • Heart disease Maternal Grandfather      Review of Systems   Constitutional: Positive for activity change and fatigue. Negative for appetite change, fever and unexpected weight change.   HENT: Negative for congestion, mouth sores, nosebleeds, sore throat and voice change.    Respiratory: Negative for cough, shortness of breath and wheezing.    Cardiovascular: Negative for chest pain, palpitations and leg swelling.   Gastrointestinal: Negative for abdominal distention, abdominal pain, blood in stool, constipation, diarrhea, nausea and vomiting.   Endocrine: Negative for cold intolerance and heat intolerance.   Genitourinary: Negative for difficulty urinating, dysuria, frequency and hematuria.   Musculoskeletal: Positive for back pain. Negative for arthralgias, joint swelling and myalgias.   Skin: Positive for wound. Negative for rash.   Neurological: Negative for dizziness, syncope, weakness, light-headedness, numbness and headaches.   Hematological: Negative for adenopathy. Does not bruise/bleed easily.   Psychiatric/Behavioral: Negative for confusion and sleep disturbance. The patient is not nervous/anxious.          Objective      Vitals:    12/26/17 1416   BP: (!) 88/62   Pulse: 115   Resp: 18   Temp: 98.3 °F (36.8 °C)   SpO2: 99%      Current Status 12/26/2017   ECOG score 3   Pain 0/10    Physical Exam   Constitutional: She is oriented to person, place, and time.   The patient is seated in a wheelchair with a brace in place   HENT:   Oral thrush noted.   Eyes: Conjunctivae are normal.   Neck: No thyromegaly present.   Cardiovascular: Normal rate and regular rhythm.  Exam reveals no gallop and  no friction rub.    No murmur heard.  Pulmonary/Chest: Breath sounds normal. No respiratory distress.   Abdominal:   Limited abdominal exam due to presence of brace.   Musculoskeletal: She exhibits no edema.   Boot in place left lower extremity   Lymphadenopathy:        Head (right side): No submandibular adenopathy present.     She has no cervical adenopathy.     She has no axillary adenopathy.        Right: No inguinal and no supraclavicular adenopathy present.        Left: No inguinal and no supraclavicular adenopathy present.   Neurological: She is alert and oriented to person, place, and time. She displays normal reflexes. No cranial nerve deficit. She exhibits normal muscle tone.   Skin: Skin is warm and dry. No rash noted.   Psychiatric: She has a normal mood and affect. Her behavior is normal.       RECENT LABS:  Hematology WBC   Date Value Ref Range Status   12/26/2017 7.83 4.00 - 10.00 10*3/mm3 Final     RBC   Date Value Ref Range Status   12/26/2017 3.54 (L) 3.90 - 5.00 10*6/mm3 Final     Hemoglobin   Date Value Ref Range Status   12/26/2017 11.1 (L) 11.5 - 14.9 g/dL Final     Hematocrit   Date Value Ref Range Status   12/26/2017 32.8 (L) 34.0 - 45.0 % Final     Platelets   Date Value Ref Range Status   12/26/2017 339 150 - 375 10*3/mm3 Final        Lab Results   Component Value Date    GLUCOSE 141 (H) 12/16/2017    BUN 18 12/16/2017    CREATININE 0.58 12/16/2017    EGFRIFNONA 107 12/16/2017    EGFRIFAFRI 80 09/25/2017    BCR 31.0 (H) 12/16/2017    K 3.4 (L) 12/16/2017    CO2 27.0 12/16/2017    CALCIUM 9.3 12/16/2017    PROTENTOTREF 6.4 09/25/2017    ALBUMIN 2.90 (L) 12/16/2017    LABIL2 1.0 12/16/2017    AST 13 12/16/2017    ALT 18 12/16/2017     Pathology results reviewed as discussed below in the assessment.    Assessment/Plan      1. Previous Stage Ib ([id number]) right breast cancer:  · Diagnosed May 2010 with excisional biopsy for invasive ductal carcinoma, 1.3 cm, grade 2, ER 90%, ND 80%,  HER-2/peter negative (1+ IHC).    · Subsequent right mastectomy in July 2010 with no residual breast malignancy, 1/5 sentinel lymph node with micrometastasis (0.25 mm).    · Treated in the [facility] system with adjuvant AC ×4 cycles in 2010 (no taxanes administered due to underlying Charcot-Saloni-Tooth with peripheral neuropathy).    · Adjuvant Femara (postmenopausal) initiated October 2010 with plan to treat ×10 years.    · Genetic testing reportedly negative.    · Developed osteopenia treated with Prolia beginning 2/27/13.  2. Recurrent/metastatic disease identified 10/8/17:  · Disease involving thoracic spine with cord compression at T6, lumbosacral involvement, sternal and right sternoclavicular involvement.    · Femara discontinued in 10/2017.    · Radiation administered (in the [facility] system) to the thoracic spine beginning 10/19/17 treating T3-T9 to a dose of 24 gray in 6 fractions.  · Evaluation with MRI 12/8/17 showing persistent T6 cord compression with persistent neurologic compromise requiring surgical treatment 12/11/17 with T6 laminectomy/corpectomy and T3-T9 fusion.  Pathology with metastatic carcinoma of breast origin, ER negative, TX negative, HER-2/peter negative (1+ IHC).  · Additional staging evaluation 12/8/17 with no evidence of visceral metastatic disease, bone scan showing involvement of thoracic spine, sternum, left humerus, mid frontal bone.  No plane film correlate of left humerus lesion.  MRI lumbar spine with small intradural L3 metastasis.  CA 15-3 12/6/17- 17.  · The patient returned today in follow-up postoperatively.  We reviewed her pathologic result which indicates metastatic carcinoma of breast origin however it is now ER/TX negative and HER-2/peter negative (IHC 1+) indicating triple negative disease, different from her previous disease was ER/TX positive and HER-2/peter negative.  It is unclear whether the ER/TX result anastasia false negative related to bone biopsy and decalcification.   Unfortunately, there are no visceral metastasis available for biopsy.  Therefore we will have to treat according to the current available pathologic results.  The patient is continuing to slowly recover from her surgery and has regained minimal function at this point.  Given the long-standing duration of her neurologic compromise/cord compression, it is unclear whether she will regain meaningful functional status at this point.  I did contact Dr. Mehta to review the situation.  Once she has recovered further from her surgery he intends to treat with palliative radiation in 10 fractions to her left humerus and the L3 dural lesion.  He is seeing her back on 1/8/17 and likely will begin treatment a day or 2 later.  Given her triple negative disease, I have recommended against any further hormonal treatment at this time and instead have recommended the use of palliative single agent chemotherapy in the form of oral Xeloda.  We did discuss briefly side effects of therapy today.  Her dose would be 3500 mg daily (2000 mg in the morning, 1500 mg in the evening) for 14/21 days.  We will not order the medication or pursue chemotherapy education yet.  I will plan to see her back on 1/16/17.  We will assess her functional status and surgical recovery at that time as well as duration of her radiation treatment.  We will make plans from there regarding her systemic therapy.  We also discussed beginning monthly Xgeva in conjunction with chemotherapy in the future.  We did discuss the palliative intent of any further treatment at this point and the patient and her  expressed understanding.  3. Right pulmonary embolism:  · Diagnosed on CT angiogram 10/21/17 involving small right lower lobe pulmonary artery.  Lower extremity Dopplers negative.  · Bilateral lower extremity Dopplers negative again 12/5/17.  · Continuing on Lovenox 1 mg/kg (100 mg) subcutaneous injection every 12 hours.  4. Cancer related pain:  · Continuing on  Duragesic 50 µg patch every 72 hours along with Dilaudid 4 mg as needed for breakthrough pain  · Currently requiring approximately 1 dose of Dilaudid daily.  5. Constipation:  · Related to narcotic use as well as cord compression  · Currently receiving lactulose 20 g twice a day, MiraLAX daily, Senokot 3 tablets twice daily.  6. Thrush:  · Previously resolved on nystatin during hospitalization with nystatin discontinued  · Recurrent thrush on exam today, contacted rehabilitation facility and prescribed nystatin 5 cc swish and swallow 4 times a day.  7. Traumatic left tibia/fibular fracture:  · Status post ORIF 12/6/17  · Specimen was sent for pathologic review, negative for evidence of malignancy  · Increased recent drainage from hardware site, currently receiving clindamycin  8. Hypotension:  · Patient is mildly hypotensive today with systolic blood pressure of 88.  She is asymptomatic.  We did contact the rehabilitation facility and it is noted that she was tapered off of steroids just recently following her hospitalization.  If her hypotension continues, will need to consider the possibility of adrenal insufficiency and resuming Decadron.    Plan:  1. Prescription called to rehabilitation facility for nystatin 5 cc swish and swallow 4 times a day for recurrent thrush  2. Continue inpatient subacute rehabilitation  3. Follow-up with radiation oncology Dr. Mehta (1/8/17) as well as neurosurgery as scheduled.  Anticipate the patient will initiate radiation to left humerus and L3 dural metastasis shortly after her 1/8/17 visit for 10 fractions.  4. M.D. visit on 1/16/17 with CBC, CMP.  We will reassess the patient's postoperative recovery and functional status and discuss timing to initiate systemic therapy with palliative single agent oral Xeloda and monthly Xgeva.    I did spend over 45 minutes with the patient today, 75% of that time in face-to-face consultation and coordination of care involving issues outlined  in the assessment and plan above.

## 2017-12-28 ENCOUNTER — APPOINTMENT (OUTPATIENT)
Dept: WOUND CARE | Facility: HOSPITAL | Age: 57
End: 2017-12-28

## 2017-12-28 PROCEDURE — G0463 HOSPITAL OUTPT CLINIC VISIT: HCPCS

## 2018-01-03 ENCOUNTER — PATIENT OUTREACH (OUTPATIENT)
Dept: CASE MANAGEMENT | Facility: OTHER | Age: 58
End: 2018-01-03

## 2018-01-04 ENCOUNTER — APPOINTMENT (OUTPATIENT)
Dept: WOUND CARE | Facility: HOSPITAL | Age: 58
End: 2018-01-04

## 2018-01-08 ENCOUNTER — APPOINTMENT (OUTPATIENT)
Dept: RADIATION ONCOLOGY | Facility: HOSPITAL | Age: 58
End: 2018-01-08

## 2018-01-08 ENCOUNTER — OFFICE VISIT (OUTPATIENT)
Dept: RADIATION ONCOLOGY | Facility: HOSPITAL | Age: 58
End: 2018-01-08

## 2018-01-08 VITALS
HEART RATE: 98 BPM | SYSTOLIC BLOOD PRESSURE: 131 MMHG | TEMPERATURE: 98 F | OXYGEN SATURATION: 98 % | DIASTOLIC BLOOD PRESSURE: 77 MMHG | RESPIRATION RATE: 16 BRPM

## 2018-01-08 DIAGNOSIS — C79.51 BONE METASTASES: Primary | ICD-10-CM

## 2018-01-08 PROCEDURE — 77290 THER RAD SIMULAJ FIELD CPLX: CPT | Performed by: RADIOLOGY

## 2018-01-08 PROCEDURE — 77263 THER RADIOLOGY TX PLNG CPLX: CPT | Performed by: RADIOLOGY

## 2018-01-08 PROCEDURE — FACE2FACE: Performed by: RADIOLOGY

## 2018-01-08 NOTE — PROGRESS NOTES
Patient returns now 1 month post ORIF left tibia and 3 weeks post-thoracic T6 laminectomy and decompression with hardware.  She is planned for palliative radiation therapy to L3 and a metastasis in mid shaft of the left humerus.  She is wearing a turtle shell back brace and has a boot on her left lower extremity.  We started treatment planning with CT simulation of the lumbar spine and left humerus and would anticipate beginning her treatments approximately 1 week from now.  Dose aim is 30 Gy in 10 fractions.

## 2018-01-10 ENCOUNTER — TELEPHONE (OUTPATIENT)
Dept: ONCOLOGY | Facility: HOSPITAL | Age: 58
End: 2018-01-10

## 2018-01-10 ENCOUNTER — PATIENT OUTREACH (OUTPATIENT)
Dept: CASE MANAGEMENT | Facility: OTHER | Age: 58
End: 2018-01-10

## 2018-01-10 NOTE — TELEPHONE ENCOUNTER
Referral received from Glenny RN in radiation center.  Patient and  interested in ambulance transfers for upcoming radiation appt's. Due to back brace and inability to safely transfer from wheelchair to table in Radiation treatment room.  OSW spoke to  to let him know that we can arrange and also send a Physicians Certification Statement to [facility] in hopes that this will show medical justification for ambulance transfers.  OSW explained to  that there is not 100% guarantee that medicareAB will cover the cost of the ambulance transportation.  expressed understanding , but still wants to proceed.    PCS for [facility] ambulance signed by Dr. Mehta and faxed to [facility].  OSW called and spoke to SW at [facility] to let her know that [facility] must arrange transportation and also obtain a PCS from their medical director.  Patient's appt time is 1:15.  OSW has called to let  know all of the above.    OSW will remain available to assist as needed,  Thank you,  May Aaron, GRECIAW, CSW, OSW-C

## 2018-01-11 ENCOUNTER — APPOINTMENT (OUTPATIENT)
Dept: WOUND CARE | Facility: HOSPITAL | Age: 58
End: 2018-01-11

## 2018-01-11 ENCOUNTER — OFFICE VISIT (OUTPATIENT)
Dept: NEUROSURGERY | Facility: CLINIC | Age: 58
End: 2018-01-11

## 2018-01-11 VITALS
HEIGHT: 61 IN | HEART RATE: 101 BPM | DIASTOLIC BLOOD PRESSURE: 74 MMHG | SYSTOLIC BLOOD PRESSURE: 114 MMHG | WEIGHT: 194 LBS | BODY MASS INDEX: 36.63 KG/M2

## 2018-01-11 DIAGNOSIS — Z09 SURGERY FOLLOW-UP EXAMINATION: Primary | ICD-10-CM

## 2018-01-11 PROCEDURE — 99024 POSTOP FOLLOW-UP VISIT: CPT | Performed by: PHYSICIAN ASSISTANT

## 2018-01-11 NOTE — PROGRESS NOTES
Subjective   Patient ID: Martha Davey is a 57 y.o. female is here today for follow-up.  She is 1 month out from a T6 laminectomy, corpectomy by Dr. Gonzalez and T2-T10 fusion by Dr. Nayak 12/11/17. She is currently at [facility] where she is getting PT and OT daily. She states she is not having any pain at this time. Mrs. Davey takes  Cyclobenzaprine 10 mg PRN and uses fentanyl patches for pain.      Back Pain   The pain is at a severity of 3/10. The pain is mild. Associated symptoms include weakness. Pertinent negatives include no fever.       The following portions of the patient's history were reviewed and updated as appropriate: allergies, current medications, past family history, past medical history, past social history, past surgical history and problem list.    Review of Systems   Constitutional: Negative for fever.   Genitourinary: Negative for difficulty urinating.   Musculoskeletal: Positive for back pain.   Neurological: Positive for weakness.       Objective   Physical Exam   Neurological:   Incision ok    Has bilateral leg weakness, quad 4+/5 bilaterally, iliopsoas (4-/5 bilaterally) as well as bilateral foot drops.  L ankle and leg in a walking boot. R leg in AFO.       Neurologic Exam    Assessment/Plan   Independent Review of Radiographic Studies:      Medical Decision Making:    Ms. Davey is 1 month out from a thoracic laminectomy with T2-T10 fusion by Jennifer Gonzalez and Nael or a pathologic met at T6 causing cord compression and myelopathy.  She has a history of metastatic breast cancer.  She did also undergo an ORIF of a L tib-fib fxr just before the spine surgery.     She is currently undergoing physical therapy.  Her pain is fairly well controlled on the Duragesic patches and other pain medication that she is getting from oncology.  She continues to use the brace.  She is following up with Dr. Mehta next week and plans on starting radiation at that time.    She has not yet had a  follow-up appointment with Dr. Nayak.  She wants to know when she can stop using the brace and I told her that we would defer to Dr. Nayak to make that decision.  We will try to make that appointment for her today.  We will also go ahead and remove the staples from the right hip wound.    She will call with any worsening in regards to pain or strength or sensation and otherwise follow-up in 4-6wks.        Martha was seen today for post-op.    Diagnoses and all orders for this visit:    Surgery follow-up examination    Return in about 6 weeks (around 2/22/2018) for with Dr. Gonzalez.

## 2018-01-12 ENCOUNTER — OFFICE VISIT (OUTPATIENT)
Dept: ORTHOPEDIC SURGERY | Facility: CLINIC | Age: 58
End: 2018-01-12

## 2018-01-12 DIAGNOSIS — Z98.890 HISTORY OF OPEN REDUCTION AND INTERNAL FIXATION (ORIF) PROCEDURE: ICD-10-CM

## 2018-01-12 DIAGNOSIS — M43.24 FUSION OF SPINE OF THORACIC REGION: Primary | ICD-10-CM

## 2018-01-12 PROCEDURE — 73590 X-RAY EXAM OF LOWER LEG: CPT | Performed by: ORTHOPAEDIC SURGERY

## 2018-01-12 PROCEDURE — 99024 POSTOP FOLLOW-UP VISIT: CPT | Performed by: ORTHOPAEDIC SURGERY

## 2018-01-12 PROCEDURE — 77295 3-D RADIOTHERAPY PLAN: CPT | Performed by: RADIOLOGY

## 2018-01-12 PROCEDURE — 72070 X-RAY EXAM THORAC SPINE 2VWS: CPT | Performed by: ORTHOPAEDIC SURGERY

## 2018-01-12 RX ORDER — NYSTATIN 100000 [USP'U]/G
POWDER TOPICAL 4 TIMES DAILY
COMMUNITY
End: 2018-07-11

## 2018-01-12 RX ORDER — NYSTATIN 100000 U/G
OINTMENT TOPICAL 2 TIMES DAILY
COMMUNITY
End: 2018-07-11

## 2018-01-12 RX ORDER — SACCHAROMYCES BOULARDII 250 MG
250 CAPSULE ORAL 2 TIMES DAILY
COMMUNITY
End: 2018-07-11

## 2018-01-12 RX ORDER — DULOXETIN HYDROCHLORIDE 20 MG/1
20 CAPSULE, DELAYED RELEASE ORAL DAILY
COMMUNITY
End: 2018-02-26 | Stop reason: DRUGHIGH

## 2018-01-12 NOTE — PROGRESS NOTES
She is 4 weeks out now from thoracic decompression fusion which I performed with Dr. Gonzalez, and 5 weeks out from intramedullary nailing of the left tibia which Dr. nelson performed.  No complaints with regard to either incisional area in her back pain is significantly improving.  She states she feels she has better strength in the lower extremities bilaterally.  On exam she has 4/5 strength, somewhat weak, and hip flexion 3/5 strength in knee extension, and just flicker of EHL bilaterally.  The leg wounds are healed nicely the back wound is healing extremely well and no evidence of fluctuance induration or drainage.  Two-view x-rays of the thoracic spine show good position of the implants unchanged from intraoperative position and no further collapse at the fracture site.  X-rays of the tib-fib show excellent alignment and some early healing compared to prior films.  She's doing quite well.  She asked to stop her brace  which I told her was ill-advised and she has to stop the fentanyl patches which I told her was reasonable.  In the by mouth Dilaudid and can buffer with drawl with these.  The plan then is to DC the fentanyl patches and she may remove the left ankle brace at any time other than when ambulating.  If she is out of the brace for protracted periods of time such as at night she can wear the AFO.  Continue attention to heal sore prevention/care.  I will see her back in a month with repeat x-rays of the thoracic spine and left tib-fib.

## 2018-01-15 ENCOUNTER — DOCUMENTATION (OUTPATIENT)
Dept: ONCOLOGY | Facility: HOSPITAL | Age: 58
End: 2018-01-15

## 2018-01-15 PROCEDURE — 77307 TELETHX ISODOSE PLAN CPLX: CPT | Performed by: RADIOLOGY

## 2018-01-15 PROCEDURE — 77334 RADIATION TREATMENT AID(S): CPT | Performed by: RADIOLOGY

## 2018-01-15 PROCEDURE — 77300 RADIATION THERAPY DOSE PLAN: CPT | Performed by: RADIOLOGY

## 2018-01-15 PROCEDURE — 77280 THER RAD SIMULAJ FIELD SMPL: CPT | Performed by: RADIOLOGY

## 2018-01-15 PROCEDURE — 77412 RADIATION TX DELIVERY LVL 3: CPT | Performed by: RADIOLOGY

## 2018-01-15 PROCEDURE — 77417 THER RADIOLOGY PORT IMAGE(S): CPT | Performed by: RADIOLOGY

## 2018-01-15 PROCEDURE — 77427 RADIATION TX MANAGEMENT X5: CPT | Performed by: RADIOLOGY

## 2018-01-15 PROCEDURE — 99999 PR OFFICE/OUTPT VISIT,PROCEDURE ONLY: CPT | Performed by: RADIOLOGY

## 2018-01-15 PROCEDURE — 77332 RADIATION TREATMENT AID(S): CPT | Performed by: RADIOLOGY

## 2018-01-15 NOTE — PROGRESS NOTES
Patient's  called regarding transportation concerns.  Patient has to be transported by ambulance.  Patient to come here to see Dr. Hancock tomorrow and have radiation therapy following apt.  Instructed spouse to have [facility] bring patient here and we will place in a bed for apt.  We will then arrange transport via bed/stretcher to radiation.  [facility] can pick patient up from radiation.  Spouse v/u.  Spoke with schedule desk and patient placed on the infusion schedule and Dr. Hancock's schedule.

## 2018-01-16 ENCOUNTER — OFFICE VISIT (OUTPATIENT)
Dept: ONCOLOGY | Facility: CLINIC | Age: 58
End: 2018-01-16

## 2018-01-16 ENCOUNTER — LAB (OUTPATIENT)
Dept: LAB | Facility: HOSPITAL | Age: 58
End: 2018-01-16

## 2018-01-16 ENCOUNTER — APPOINTMENT (OUTPATIENT)
Dept: ONCOLOGY | Facility: HOSPITAL | Age: 58
End: 2018-01-16

## 2018-01-16 VITALS
SYSTOLIC BLOOD PRESSURE: 109 MMHG | DIASTOLIC BLOOD PRESSURE: 67 MMHG | RESPIRATION RATE: 18 BRPM | HEART RATE: 112 BPM | HEIGHT: 61 IN | OXYGEN SATURATION: 94 % | TEMPERATURE: 98.1 F

## 2018-01-16 DIAGNOSIS — Z17.1 MALIGNANT NEOPLASM OF OVERLAPPING SITES OF RIGHT BREAST IN FEMALE, ESTROGEN RECEPTOR NEGATIVE (HCC): ICD-10-CM

## 2018-01-16 DIAGNOSIS — G89.3 CANCER ASSOCIATED PAIN: ICD-10-CM

## 2018-01-16 DIAGNOSIS — G60.0 HEREDITARY SENSORIMOTOR NEUROPATHY: ICD-10-CM

## 2018-01-16 DIAGNOSIS — G95.20 CORD COMPRESSION (HCC): ICD-10-CM

## 2018-01-16 DIAGNOSIS — C50.811 MALIGNANT NEOPLASM OF OVERLAPPING SITES OF RIGHT BREAST IN FEMALE, ESTROGEN RECEPTOR NEGATIVE (HCC): ICD-10-CM

## 2018-01-16 DIAGNOSIS — K59.03 DRUG INDUCED CONSTIPATION: ICD-10-CM

## 2018-01-16 DIAGNOSIS — Z86.711 HISTORY OF PULMONARY EMBOLISM: ICD-10-CM

## 2018-01-16 DIAGNOSIS — C79.51 BONE METASTASES: Primary | ICD-10-CM

## 2018-01-16 LAB
ALBUMIN SERPL-MCNC: 3 G/DL (ref 3.5–5.2)
ALBUMIN/GLOB SERPL: 1.1 G/DL (ref 1.1–2.4)
ALP SERPL-CCNC: 97 U/L (ref 38–116)
ALT SERPL W P-5'-P-CCNC: 12 U/L (ref 0–33)
ANION GAP SERPL CALCULATED.3IONS-SCNC: 10.4 MMOL/L
AST SERPL-CCNC: 13 U/L (ref 0–32)
BASOPHILS # BLD AUTO: 0.04 10*3/MM3 (ref 0–0.1)
BASOPHILS NFR BLD AUTO: 0.5 % (ref 0–1.1)
BILIRUB SERPL-MCNC: 0.4 MG/DL (ref 0.1–1.2)
BUN BLD-MCNC: 7 MG/DL (ref 6–20)
BUN/CREAT SERPL: 9 (ref 7.3–30)
CALCIUM SPEC-SCNC: 10.5 MG/DL (ref 8.5–10.2)
CHLORIDE SERPL-SCNC: 99 MMOL/L (ref 98–107)
CO2 SERPL-SCNC: 31.6 MMOL/L (ref 22–29)
CREAT BLD-MCNC: 0.78 MG/DL (ref 0.6–1.1)
DEPRECATED RDW RBC AUTO: 63.1 FL (ref 37–49)
EOSINOPHIL # BLD AUTO: 0.07 10*3/MM3 (ref 0–0.36)
EOSINOPHIL NFR BLD AUTO: 0.8 % (ref 1–5)
ERYTHROCYTE [DISTWIDTH] IN BLOOD BY AUTOMATED COUNT: 17.5 % (ref 11.7–14.5)
GFR SERPL CREATININE-BSD FRML MDRD: 76 ML/MIN/1.73
GLOBULIN UR ELPH-MCNC: 2.8 GM/DL (ref 1.8–3.5)
GLUCOSE BLD-MCNC: 117 MG/DL (ref 74–124)
HCT VFR BLD AUTO: 32.5 % (ref 34–45)
HGB BLD-MCNC: 10.3 G/DL (ref 11.5–14.9)
IMM GRANULOCYTES # BLD: 0.05 10*3/MM3 (ref 0–0.03)
IMM GRANULOCYTES NFR BLD: 0.6 % (ref 0–0.5)
LYMPHOCYTES # BLD AUTO: 0.71 10*3/MM3 (ref 1–3.5)
LYMPHOCYTES NFR BLD AUTO: 8.2 % (ref 20–49)
MCH RBC QN AUTO: 31.1 PG (ref 27–33)
MCHC RBC AUTO-ENTMCNC: 31.7 G/DL (ref 32–35)
MCV RBC AUTO: 98.2 FL (ref 83–97)
MONOCYTES # BLD AUTO: 0.73 10*3/MM3 (ref 0.25–0.8)
MONOCYTES NFR BLD AUTO: 8.4 % (ref 4–12)
NEUTROPHILS # BLD AUTO: 7.08 10*3/MM3 (ref 1.5–7)
NEUTROPHILS NFR BLD AUTO: 81.5 % (ref 39–75)
NRBC BLD MANUAL-RTO: 0 /100 WBC (ref 0–0)
PLATELET # BLD AUTO: 378 10*3/MM3 (ref 150–375)
PMV BLD AUTO: 9.5 FL (ref 8.9–12.1)
POTASSIUM BLD-SCNC: 3.6 MMOL/L (ref 3.5–4.7)
PROT SERPL-MCNC: 5.8 G/DL (ref 6.3–8)
RBC # BLD AUTO: 3.31 10*6/MM3 (ref 3.9–5)
SODIUM BLD-SCNC: 141 MMOL/L (ref 134–145)
WBC NRBC COR # BLD: 8.68 10*3/MM3 (ref 4–10)

## 2018-01-16 PROCEDURE — 99214 OFFICE O/P EST MOD 30 MIN: CPT | Performed by: INTERNAL MEDICINE

## 2018-01-16 PROCEDURE — 77336 RADIATION PHYSICS CONSULT: CPT | Performed by: RADIOLOGY

## 2018-01-16 PROCEDURE — 77412 RADIATION TX DELIVERY LVL 3: CPT | Performed by: RADIOLOGY

## 2018-01-16 PROCEDURE — 36415 COLL VENOUS BLD VENIPUNCTURE: CPT | Performed by: INTERNAL MEDICINE

## 2018-01-16 PROCEDURE — 80053 COMPREHEN METABOLIC PANEL: CPT

## 2018-01-16 PROCEDURE — 85025 COMPLETE CBC W/AUTO DIFF WBC: CPT | Performed by: INTERNAL MEDICINE

## 2018-01-17 ENCOUNTER — DOCUMENTATION (OUTPATIENT)
Dept: ONCOLOGY | Facility: CLINIC | Age: 58
End: 2018-01-17

## 2018-01-17 PROCEDURE — 77412 RADIATION TX DELIVERY LVL 3: CPT | Performed by: RADIOLOGY

## 2018-01-17 RX ORDER — CAPECITABINE 500 MG/1
TABLET, FILM COATED ORAL
Qty: 98 TABLET | Refills: 3 | Status: SHIPPED | OUTPATIENT
Start: 2018-01-17 | End: 2018-07-09 | Stop reason: SDUPTHER

## 2018-01-17 NOTE — PROGRESS NOTES
Subjective .     REASONS FOR FOLLOWUP:    1. Stage Ib ([id number]) right breast cancer: Diagnosed May 2010 with excisional biopsy for invasive ductal carcinoma, 1.3 cm, grade 2, ER 90%, AK 80%, HER-2/peter negative (1+ IHC).  Subsequent right mastectomy in July 2010 with no residual breast malignancy, 1/5 sentinel lymph node with micrometastasis (0.25 mm).  Treated in the [facility] system with adjuvant AC ×4 cycles in 2010 (no taxanes administered due to underlying Charcot-Saloni-Tooth with peripheral neuropathy).  Adjuvant Femara (postmenopausal) initiated October 2010 with plan to treat ×10 years.  Genetic testing reportedly negative.  Developed osteopenia treated with Prolia beginning 2/27/13.  2. Recurrent/metastatic disease identified 10/8/17 involving thoracic spine with cord compression at T6, lumbosacral involvement, sternal and right sternoclavicular involvement.  Femara discontinued.  Radiation administered (in the [facility] system) to the thoracic spine beginning 10/19/17 treating T3-T9 to a dose of 24 gray in 6 fractions.  3. Evaluation with MRI 12/8/17 showing persistent T6 cord compression with persistent neurologic compromise requiring surgical treatment 12/11/17 with T6 laminectomy/corpectomy and T3-T9 fusion.  Pathology with metastatic carcinoma of breast origin, ER negative, AK negative, HER-2/peter negative (1+ IHC).  4. Right pulmonary embolism 10/21/17 continuing on Lovenox 1 mg/kg (100 mg) every 12 hours.  No evidence of DVT.  5. Cancer related pain previously on Duragesic 50 µg patch every 72 hours and Dilaudid 4 mg as needed for breakthrough pain.  Narcotics subsequently discontinued with improvement in pain in January 2018.  6. Radiation therapy to L3 dural metastasis and left humerus initiated 1/15/18 (10 fractions)    HISTORY OF PRESENT ILLNESS:  The patient is a 57 y.o. year old female who is here for follow-up with the above-mentioned history.    History of Present Illness   the patient returns  today in follow-up, continuing to reside at [facility] for subacute rehabilitation.  He did follow-up with Dr. Moore in orthopedics on 18 who felt that both her left tibial incisions and her back incisions were healing up well.  She is continuing to progress with physical therapy, remains mainly confined to bed, able to stand for brief periods of time now.  She is seen today in a chemotherapy bed in the infusion area.  She notes improvement in her pain and was able to discontinue her Duragesic patch and has not required any Dilaudid.  Constipation has improved.  Thrush has resolved.  She does not think that she is receiving any nystatin at this point.  She does continue on Lovenox injections every 12 hours and tolerates this well with no bleeding issues.  She denies any new symptoms.  She did initiate radiation therapy yesterday to L3 and the left humerus.    Past Medical History:   Diagnosis Date   • Acute pulmonary embolism, cancer-related 10/2017   • Allergic rhinitis    • Arthritis     Right knee; left shoulder   • Cancer 2010    Right breast   • Cancer 10/2017    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   • Charcot-Saloni-Tooth disease    • CPAP (continuous positive airway pressure) dependence    • GERD (gastroesophageal reflux disease)    • H/O Colon polyps    • H/O Uterine fibroid    • Heart murmur    • Hyperlipidemia    • Hypertension    • PONV (postoperative nausea and vomiting)      Past Surgical History:   Procedure Laterality Date   • BLADDER SURGERY      Bladder lift   • BREAST RECONSTRUCTION, BREAST TISSUE EXPANDER INSERTION Right    • BREAST SURGERY Right 2010    Mastectomy   •  SECTION  ,    • CHOLECYSTECTOMY     • COLONOSCOPY     • HERNIA REPAIR  2015    [facility]   • HYSTERECTOMY      Partial   • KNEE SURGERY Right    • LEG SURGERY Left     Broken   • MASTECTOMY Right    • MO TREAT TIBIAL SHAFT FX, INTRAMED IMPLANT Left 2017    Procedure: TIBIA  INTRAMEDULLARY NAIL/DON INSERTION;  Surgeon: Romero Shanks MD;  Location: [facility];  Service: Orthopedics   • THORACIC DECOMPRESSION POSTERIOR FUSION WITH INSTRUMENTATION N/A 12/11/2017    Procedure: T6 costotransversectomy and decompression with T3 to  T9 posterior spinal fusion with instrumentation with Jennifer Gonzalez and Nael Wilkes;  Surgeon: Quan Nayak MD;  Location: [facility];  Service:        ONCOLOGIC HISTORY:  (History from previous dates can be found in the separate document.)    Current Outpatient Prescriptions on File Prior to Visit   Medication Sig Dispense Refill   • acetaminophen (TYLENOL) 500 MG tablet Take 500 mg by mouth Every 6 (Six) Hours As Needed for Mild Pain .     • atorvastatin (LIPITOR) 10 MG tablet Take 1 tablet by mouth Daily. 90 tablet 3   • cyclobenzaprine (FLEXERIL) 10 MG tablet Take 1 tablet by mouth 3 (Three) Times a Day As Needed for Muscle Spasms. 30 tablet 3   • DULoxetine (CYMBALTA) 20 MG capsule Take 20 mg by mouth Daily.     • enoxaparin (LOVENOX) 100 MG/ML solution syringe Inject 1 mg/kg under the skin Every 12 (Twelve) Hours.     • fentaNYL (DURAGESIC) 50 MCG/HR patch Place 1 patch on the skin Every 72 (Seventy-Two) Hours. 3 patch 0   • FLONASE ALLERGY RELIEF 50 MCG/ACT nasal spray 2 sprays into each nostril daily.  11   • gabapentin (NEURONTIN) 300 MG capsule Take 1 capsule by mouth 3 (Three) Times a Day. 90 capsule 2   • glycerin (ADULT) 2 g suppository rectal suppository Insert 2 g into the rectum.     • HYDROmorphone (DILAUDID) 4 MG tablet Take 1 tablet by mouth Every 4 (Four) Hours As Needed for Moderate Pain . 20 tablet 0   • lactulose (CHRONULAC) 10 GM/15ML solution Take 30 mL by mouth 2 (Two) Times a Day. 1892 mL 2   • mesalamine (LIALDA) 1.2 G EC tablet Take 1,200 mg by mouth 2 (two) times a day.     • nystatin (MYCOSTATIN) 935015 UNIT/GM ointment Apply  topically 2 (Two) Times a Day.     • nystatin (nystatin) 123762 UNIT/GM powder Apply   topically 4 (Four) Times a Day.     • omeprazole (PriLOSEC) 40 MG capsule TAKE 1 CAPSULE DAILY 90 capsule 2   • ondansetron ODT (ZOFRAN-ODT) 4 MG disintegrating tablet DIS 1 T ON THE TONGUE Q 8 H PRF NAUSEA  0   • polyethylene glycol (MIRALAX) packet Take 17 g by mouth Daily.     • saccharomyces boulardii (FLORASTOR) 250 MG capsule Take 250 mg by mouth 2 (Two) Times a Day.     • sennosides-docusate sodium (SENOKOT-S) 8.6-50 MG tablet Take 2 tablets by mouth 2 (Two) Times a Day. 90 tablet 2   • traMADol (ULTRAM) 50 MG tablet Take 1 tablet by mouth Every 8 (Eight) Hours As Needed for Moderate Pain . 30 tablet 0   • Tuberculin PPD (APLISOL ID) Inject  into the skin.     • psyllium (METAMUCIL) 58.6 % packet Take 1 packet by mouth Daily.       No current facility-administered medications on file prior to visit.        ALLERGIES:     Allergies   Allergen Reactions   • Hydrocodone Nausea Only   • Morphine And Related Nausea And Vomiting     Social History     Social History   • Marital status:      Spouse name: Murray   • Number of children: 3   • Years of education: College     Occupational History   •  Retired     Social History Main Topics   • Smoking status: Never Smoker   • Smokeless tobacco: Never Used   • Alcohol use Yes      Comment: social   • Drug use: No   • Sexual activity: Defer     Other Topics Concern   • Not on file     Social History Narrative     Family History   Problem Relation Age of Onset   • Heart disease Mother    • Hyperlipidemia Mother    • Hypertension Mother    • Diabetes Father    • Charcot-Saloni-Tooth disease Father    • Heart disease Father    • Hypertension Father    • Heart failure Father    • Diabetes Sister    • Heart disease Sister    • Hypertension Sister    • Heart disease Maternal Aunt    • Scoliosis Maternal Aunt    • Heart disease Maternal Uncle    • Diabetes Maternal Grandmother    • Heart disease Maternal Grandmother    • Hypertension Maternal Grandmother    • Uterine  cancer Maternal Grandmother    • Heart disease Maternal Grandfather      Review of Systems   Constitutional: Positive for activity change and fatigue. Negative for appetite change, fever and unexpected weight change.   HENT: Negative for congestion, mouth sores, nosebleeds, sore throat and voice change.    Respiratory: Negative for cough, shortness of breath and wheezing.    Cardiovascular: Negative for chest pain, palpitations and leg swelling.   Gastrointestinal: Negative for abdominal distention, abdominal pain, blood in stool, constipation, diarrhea, nausea and vomiting.   Endocrine: Negative for cold intolerance and heat intolerance.   Genitourinary: Negative for difficulty urinating, dysuria, frequency and hematuria.   Musculoskeletal: Negative for arthralgias, back pain, joint swelling and myalgias.   Skin: Negative for rash.   Neurological: Negative for dizziness, syncope, weakness, light-headedness, numbness and headaches.   Hematological: Negative for adenopathy. Does not bruise/bleed easily.   Psychiatric/Behavioral: Negative for confusion and sleep disturbance. The patient is not nervous/anxious.          Objective      Vitals:    01/16/18 1243   BP: 109/67   Pulse: 112   Resp: 18   Temp: 98.1 °F (36.7 °C)   SpO2: 94%      Current Status 1/16/2018   ECOG score 2   Pain 0/10    Physical Exam   Constitutional: She is oriented to person, place, and time.   The patient is seen today lying in a chemotherapy bed in the infusion area.   HENT:   Oral thrush resolved.   Eyes: Conjunctivae are normal.   Neck: No thyromegaly present.   Cardiovascular: Normal rate and regular rhythm.  Exam reveals no gallop and no friction rub.    No murmur heard.  Pulmonary/Chest: Breath sounds normal. No respiratory distress.   Abdominal: Soft. Bowel sounds are normal. She exhibits no distension. There is no tenderness.   Musculoskeletal: She exhibits no edema.   Boot in place left lower extremity   Lymphadenopathy:        Head  (right side): No submandibular adenopathy present.     She has no cervical adenopathy.     She has no axillary adenopathy.        Right: No inguinal and no supraclavicular adenopathy present.        Left: No inguinal and no supraclavicular adenopathy present.   Neurological: She is alert and oriented to person, place, and time. No cranial nerve deficit.   Slight improvement in strength in bilateral lower extremities, 3+/5   Skin: Skin is warm and dry. No rash noted.   Psychiatric: She has a normal mood and affect. Her behavior is normal.       RECENT LABS:  Hematology WBC   Date Value Ref Range Status   01/16/2018 8.68 4.00 - 10.00 10*3/mm3 Final     RBC   Date Value Ref Range Status   01/16/2018 3.31 (L) 3.90 - 5.00 10*6/mm3 Final     Hemoglobin   Date Value Ref Range Status   01/16/2018 10.3 (L) 11.5 - 14.9 g/dL Final     Hematocrit   Date Value Ref Range Status   01/16/2018 32.5 (L) 34.0 - 45.0 % Final     Platelets   Date Value Ref Range Status   01/16/2018 378 (H) 150 - 375 10*3/mm3 Final        Lab Results   Component Value Date    GLUCOSE 117 01/16/2018    BUN 7 01/16/2018    CREATININE 0.78 01/16/2018    EGFRIFNONA 76 01/16/2018    EGFRIFAFRI 80 09/25/2017    BCR 9.0 01/16/2018    K 3.6 01/16/2018    CO2 31.6 (H) 01/16/2018    CALCIUM 10.5 (H) 01/16/2018    PROTENTOTREF 6.4 09/25/2017    ALBUMIN 3.00 (L) 01/16/2018    LABIL2 1.1 01/16/2018    AST 13 01/16/2018    ALT 12 01/16/2018         Assessment/Plan      1. Previous Stage Ib ([id number]) right breast cancer:  · Diagnosed May 2010 with excisional biopsy for invasive ductal carcinoma, 1.3 cm, grade 2, ER 90%, AZ 80%, HER-2/peter negative (1+ IHC).    · Subsequent right mastectomy in July 2010 with no residual breast malignancy, 1/5 sentinel lymph node with micrometastasis (0.25 mm).    · Treated in the [facility] system with adjuvant AC ×4 cycles in 2010 (no taxanes administered due to underlying Charcot-Saloni-Tooth with peripheral neuropathy).    · Adjuvant  Femara (postmenopausal) initiated October 2010 with plan to treat ×10 years.    · Genetic testing reportedly negative.    · Developed osteopenia treated with Prolia beginning 2/27/13.  2. Recurrent/metastatic disease identified 10/8/17:  · Disease involving thoracic spine with cord compression at T6, lumbosacral involvement, sternal and right sternoclavicular involvement.    · Femara discontinued in 10/2017.    · Radiation administered (in the [facility] system) to the thoracic spine beginning 10/19/17 treating T3-T9 to a dose of 24 gray in 6 fractions.  · Evaluation with MRI 12/8/17 showing persistent T6 cord compression with persistent neurologic compromise requiring surgical treatment 12/11/17 with T6 laminectomy/corpectomy and T3-T9 fusion.  Pathology with metastatic carcinoma of breast origin, ER negative, UT negative, HER-2/peter negative (1+ IHC).  · Additional staging evaluation 12/8/17 with no evidence of visceral metastatic disease, bone scan showing involvement of thoracic spine, sternum, left humerus, mid frontal bone.  No plane film correlate of left humerus lesion.  MRI lumbar spine with small intradural L3 metastasis.  CA 15-3 12/6/17- 17.  · Palliative radiation therapy to L3 dural metastasis and left humerus initiated 1/15/18 (10 fractions).  · I had a lengthy discussion again with the patient and her  today regarding future plans for her systemic therapy.  She is now improving somewhat with physical therapy, regaining some function in her lower extremities.  She has however still essentially bedbound but is able to stand for periods of time.  She started radiation therapy to L3 and left humerus on 1/15/18 and will complete her treatment on 1/26/18.  We will need to delay systemic therapy until she completes her radiation.  We would also like to see some further improvement in her overall physical status with physical therapy.  She has developed a borderline hypercalcemia, possibly related to her  malignancy with a calcium of 10.5 (albumin 3.0) today.  I did suggest that she return next week to begin monthly Xgeva.  In 2 weeks she will undergo new baseline CT chest abdomen pelvis and bone scan.  We will begin the process of obtaining oral Xeloda from the specialty pharmacy (dose equal to 2000 mg a.m., 1500 mg p.m. for 14/21 days) however the patient will not yet begin the medication.  I will see her in 3 weeks to reassess her status potentially begin Xeloda at that time.  We will schedule her for chemotherapy education in the interval.  3. Right pulmonary embolism:  · Diagnosed on CT angiogram 10/21/17 involving small right lower lobe pulmonary artery.  Lower extremity Dopplers negative.  · Bilateral lower extremity Dopplers negative again 12/5/17.  · Continuing on Lovenox 1 mg/kg (100 mg) subcutaneous injection every 12 hours.  4. Cancer related pain:  · Previously receiving Duragesic 50 µg patch every 72 hours along with Dilaudid 4 mg as needed for breakthrough pain  · The patient's pain improved over time and she was able to discontinue both Duragesic and Dilaudid in the interval.  5. Constipation:  · Related to narcotic use as well as cord compression  · Currently receiving lactulose 20 g twice a day, MiraLAX daily, Senokot 3 tablets twice daily.  Constipation has improved significantly.  6. Thrush:  · Currently resolved.  Unclear whether the patient is taking nystatin at this time.  7. Traumatic left tibia/fibular fracture:  · Status post ORIF 12/6/17  · Specimen was sent for pathologic review, negative for evidence of malignancy  · Wound is healing well per orthopedics.  8. Hypercalcemia:  · Calcium today is 10.5 with an albumin of 3.0.  Suspect hypercalcemia of malignancy  · Plans to initiate monthly Xgeva in 1 week and will reassess calcium at that time.    Plan:  1. Continue radiation therapy to L3 dural metastasis and left humerus (initiated 1/15/18 due to be completed on 1/26/18)  2. [place] [facility]  [place]. Dara Green is facilitating obtaining oral Xeloda (dose 2000 mg a.m., 1500 mg p.m. for 14/21 days) which will be sent to the patient's home.  She will not begin the medication until I see her in 3 weeks.  4. Chemotherapy education for oral Xeloda  5. In 1 week CBC, CMP, magnesium, phosphorus and begin monthly Xgeva  6. In 2 weeks CT chest abdomen pelvis and bone scan  7. In 3 weeks, M.D. visit with CBC, CMP, CA 15-3 and potential plans to begin treatment with oral Xeloda.

## 2018-01-17 NOTE — PROGRESS NOTES
Per Dr Hancock-Start the process of obtaining Xeloda for pt. I spoke with pt and her  yesterday, 1/16/18 and explained the process to them. Medication will be delivered to pt's  and he will transport it to the facility that pt is in at this time.

## 2018-01-18 ENCOUNTER — PATIENT OUTREACH (OUTPATIENT)
Dept: CASE MANAGEMENT | Facility: OTHER | Age: 58
End: 2018-01-18

## 2018-01-18 PROCEDURE — 77412 RADIATION TX DELIVERY LVL 3: CPT | Performed by: RADIOLOGY

## 2018-01-19 PROCEDURE — 77412 RADIATION TX DELIVERY LVL 3: CPT | Performed by: RADIOLOGY

## 2018-01-23 ENCOUNTER — INFUSION (OUTPATIENT)
Dept: ONCOLOGY | Facility: HOSPITAL | Age: 58
End: 2018-01-23

## 2018-01-23 ENCOUNTER — LAB (OUTPATIENT)
Dept: LAB | Facility: HOSPITAL | Age: 58
End: 2018-01-23

## 2018-01-23 DIAGNOSIS — Z17.1 MALIGNANT NEOPLASM OF OVERLAPPING SITES OF RIGHT BREAST IN FEMALE, ESTROGEN RECEPTOR NEGATIVE (HCC): ICD-10-CM

## 2018-01-23 DIAGNOSIS — Z17.1 MALIGNANT NEOPLASM OF OVERLAPPING SITES OF RIGHT BREAST IN FEMALE, ESTROGEN RECEPTOR NEGATIVE (HCC): Primary | ICD-10-CM

## 2018-01-23 DIAGNOSIS — C50.811 MALIGNANT NEOPLASM OF OVERLAPPING SITES OF RIGHT BREAST IN FEMALE, ESTROGEN RECEPTOR NEGATIVE (HCC): Primary | ICD-10-CM

## 2018-01-23 DIAGNOSIS — C50.811 MALIGNANT NEOPLASM OF OVERLAPPING SITES OF RIGHT BREAST IN FEMALE, ESTROGEN RECEPTOR NEGATIVE (HCC): ICD-10-CM

## 2018-01-23 LAB
ALBUMIN SERPL-MCNC: 3.1 G/DL (ref 3.5–5.2)
ALBUMIN/GLOB SERPL: 1 G/DL (ref 1.1–2.4)
ALP SERPL-CCNC: 101 U/L (ref 38–116)
ALT SERPL W P-5'-P-CCNC: 12 U/L (ref 0–33)
ANION GAP SERPL CALCULATED.3IONS-SCNC: 12.1 MMOL/L
AST SERPL-CCNC: 21 U/L (ref 0–32)
BASOPHILS # BLD AUTO: 0.06 10*3/MM3 (ref 0–0.1)
BASOPHILS NFR BLD AUTO: 0.9 % (ref 0–1.1)
BILIRUB SERPL-MCNC: 0.3 MG/DL (ref 0.1–1.2)
BUN BLD-MCNC: 8 MG/DL (ref 6–20)
BUN/CREAT SERPL: 11.3 (ref 7.3–30)
CALCIUM SPEC-SCNC: 10.7 MG/DL (ref 8.5–10.2)
CHLORIDE SERPL-SCNC: 100 MMOL/L (ref 98–107)
CO2 SERPL-SCNC: 26.9 MMOL/L (ref 22–29)
CREAT BLD-MCNC: 0.71 MG/DL (ref 0.6–1.1)
DEPRECATED RDW RBC AUTO: 61.5 FL (ref 37–49)
EOSINOPHIL # BLD AUTO: 0.29 10*3/MM3 (ref 0–0.36)
EOSINOPHIL NFR BLD AUTO: 4.3 % (ref 1–5)
ERYTHROCYTE [DISTWIDTH] IN BLOOD BY AUTOMATED COUNT: 16.8 % (ref 11.7–14.5)
GFR SERPL CREATININE-BSD FRML MDRD: 85 ML/MIN/1.73
GLOBULIN UR ELPH-MCNC: 3.1 GM/DL (ref 1.8–3.5)
GLUCOSE BLD-MCNC: 116 MG/DL (ref 74–124)
HCT VFR BLD AUTO: 36.8 % (ref 34–45)
HGB BLD-MCNC: 11.4 G/DL (ref 11.5–14.9)
IMM GRANULOCYTES # BLD: 0.04 10*3/MM3 (ref 0–0.03)
IMM GRANULOCYTES NFR BLD: 0.6 % (ref 0–0.5)
LYMPHOCYTES # BLD AUTO: 1 10*3/MM3 (ref 1–3.5)
LYMPHOCYTES NFR BLD AUTO: 14.7 % (ref 20–49)
MAGNESIUM SERPL-MCNC: 1.8 MG/DL (ref 1.8–2.5)
MCH RBC QN AUTO: 31 PG (ref 27–33)
MCHC RBC AUTO-ENTMCNC: 31 G/DL (ref 32–35)
MCV RBC AUTO: 100 FL (ref 83–97)
MONOCYTES # BLD AUTO: 0.61 10*3/MM3 (ref 0.25–0.8)
MONOCYTES NFR BLD AUTO: 9 % (ref 4–12)
NEUTROPHILS # BLD AUTO: 4.8 10*3/MM3 (ref 1.5–7)
NEUTROPHILS NFR BLD AUTO: 70.5 % (ref 39–75)
NRBC BLD MANUAL-RTO: 0 /100 WBC (ref 0–0)
PHOSPHATE SERPL-MCNC: 4.3 MG/DL (ref 2.5–4.5)
PLATELET # BLD AUTO: 353 10*3/MM3 (ref 150–375)
PMV BLD AUTO: 10.2 FL (ref 8.9–12.1)
POTASSIUM BLD-SCNC: 4 MMOL/L (ref 3.5–4.7)
PROT SERPL-MCNC: 6.2 G/DL (ref 6.3–8)
RBC # BLD AUTO: 3.68 10*6/MM3 (ref 3.9–5)
SODIUM BLD-SCNC: 139 MMOL/L (ref 134–145)
WBC NRBC COR # BLD: 6.8 10*3/MM3 (ref 4–10)

## 2018-01-23 PROCEDURE — 77412 RADIATION TX DELIVERY LVL 3: CPT | Performed by: RADIOLOGY

## 2018-01-23 PROCEDURE — 77417 THER RADIOLOGY PORT IMAGE(S): CPT | Performed by: RADIOLOGY

## 2018-01-23 PROCEDURE — 85025 COMPLETE CBC W/AUTO DIFF WBC: CPT | Performed by: INTERNAL MEDICINE

## 2018-01-23 PROCEDURE — 36415 COLL VENOUS BLD VENIPUNCTURE: CPT | Performed by: INTERNAL MEDICINE

## 2018-01-23 PROCEDURE — 84100 ASSAY OF PHOSPHORUS: CPT | Performed by: INTERNAL MEDICINE

## 2018-01-23 PROCEDURE — 83735 ASSAY OF MAGNESIUM: CPT | Performed by: INTERNAL MEDICINE

## 2018-01-23 PROCEDURE — 77427 RADIATION TX MANAGEMENT X5: CPT | Performed by: RADIOLOGY

## 2018-01-23 PROCEDURE — 25010000002 DENOSUMAB 120 MG/1.7ML SOLUTION: Performed by: INTERNAL MEDICINE

## 2018-01-23 PROCEDURE — 80053 COMPREHEN METABOLIC PANEL: CPT | Performed by: INTERNAL MEDICINE

## 2018-01-23 PROCEDURE — 96372 THER/PROPH/DIAG INJ SC/IM: CPT | Performed by: INTERNAL MEDICINE

## 2018-01-23 RX ADMIN — DENOSUMAB 120 MG: 120 INJECTION SUBCUTANEOUS at 12:39

## 2018-01-24 ENCOUNTER — PATIENT OUTREACH (OUTPATIENT)
Dept: CASE MANAGEMENT | Facility: OTHER | Age: 58
End: 2018-01-24

## 2018-01-24 PROCEDURE — 77417 THER RADIOLOGY PORT IMAGE(S): CPT | Performed by: RADIOLOGY

## 2018-01-24 PROCEDURE — 77412 RADIATION TX DELIVERY LVL 3: CPT | Performed by: RADIOLOGY

## 2018-01-24 NOTE — OUTREACH NOTE
Skilled Nursing Facility Discharge Flowsheet:     Skilled Nursing Facility Discharge Assessment 1/24/2018   Acute Facility Discharged From -   Acute Discharge Date -   Name of the Skilled Nursing Facility? -   Tier Level of the Skilled Nursing Facility -   Purpose of SNF Admission -   Estimated length of stay for the patient? To Be Determined   Who is the insurance provider or payor of patient stay? -   Progression of Patient? [place] [facility] [place]

## 2018-01-25 ENCOUNTER — RADIATION ONCOLOGY WEEKLY ASSESSMENT (OUTPATIENT)
Dept: RADIATION ONCOLOGY | Facility: HOSPITAL | Age: 58
End: 2018-01-25

## 2018-01-25 DIAGNOSIS — C79.51 BONE METASTASES: Primary | ICD-10-CM

## 2018-01-25 PROCEDURE — 77412 RADIATION TX DELIVERY LVL 3: CPT | Performed by: RADIOLOGY

## 2018-01-25 NOTE — PROGRESS NOTES
Physician Weekly Management Note    Diagnosis:     Diagnosis Plan   1. Bone metastases         RT Details:   Treatment #8/10 - L3 and left humerus.    Notes on Treatment course, Films, Medical progress:   Doing well.  No significant treatment-related issues.  Some occasional intermittent nausea, and fatigue.  Continue as planned.    Weekly Management:  Medication reviewed?   Yes  New medications given?   No  Problemlist reviewed?   Yes  Problem added?   No      Technical aspects reviewed:  Weekly OBI approved?   Yes  Weekly port films approved?   Yes  Change requests noted on port film?   No  Patient setup and plan reviewed?   Yes    Chart Reviewed:  Continue current treatment plan?   Yes  Treatment plan change requested?   No  CBC reviewed?   No  Concurrent Chemo?   No    Objective     Toxicities:   As above     Review of Systems   As above    There were no vitals filed for this visit.    Current Status 2018   ECOG score 2       Physical Exam  As above      Problem Summary List    Diagnosis:     Diagnosis Plan   1. 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   • Charcot-Saloni-Tooth disease    • CPAP (continuous positive airway pressure) dependence    • GERD (gastroesophageal reflux disease)    • H/O Colon polyps    • H/O Uterine fibroid    • Heart murmur    • Hyperlipidemia    • Hypertension    • PONV (postoperative nausea and vomiting)          Past Surgical History:   Procedure Laterality Date   • BLADDER SURGERY      Bladder lift   • BREAST RECONSTRUCTION, BREAST TISSUE EXPANDER INSERTION Right    • BREAST SURGERY Right 2010    Mastectomy   •  SECTION  ,    • CHOLECYSTECTOMY     • COLONOSCOPY     • HERNIA REPAIR  2015    [facility]   •  HYSTERECTOMY      Partial   • KNEE SURGERY Right    • LEG SURGERY Left     Broken   • MASTECTOMY Right 2010   • GA TREAT TIBIAL SHAFT FX, INTRAMED IMPLANT Left 12/6/2017    Procedure: TIBIA INTRAMEDULLARY NAIL/DON INSERTION;  Surgeon: Romero Shanks MD;  Location: [facility];  Service: Orthopedics   • THORACIC DECOMPRESSION POSTERIOR FUSION WITH INSTRUMENTATION N/A 12/11/2017    Procedure: T6 costotransversectomy and decompression with T3 to  T9 posterior spinal fusion with [facility];  Surgeon: Quan Nayak MD;  Location: [facility];  Service:          Current Outpatient Prescriptions on File Prior to Visit   Medication Sig Dispense Refill   • acetaminophen (TYLENOL) 500 MG tablet Take 500 mg by mouth Every 6 (Six) Hours As Needed for Mild Pain .     • atorvastatin (LIPITOR) 10 MG tablet Take 1 tablet by mouth Daily. 90 tablet 3   • capecitabine (XELODA) 500 MG chemo tablet Take 4 tabs (2000 mg) in the AM then take 3 tabs (1500 mg) in the PM for 14 days on then 7 days off. 98 tablet 3   • cyclobenzaprine (FLEXERIL) 10 MG tablet Take 1 tablet by mouth 3 (Three) Times a Day As Needed for Muscle Spasms. 30 tablet 3   • DULoxetine (CYMBALTA) 20 MG capsule Take 20 mg by mouth Daily.     • enoxaparin (LOVENOX) 100 MG/ML solution syringe Inject 1 mg/kg under the skin Every 12 (Twelve) Hours.     • fentaNYL (DURAGESIC) 50 MCG/HR patch Place 1 patch on the skin Every 72 (Seventy-Two) Hours. 3 patch 0   • FLONASE ALLERGY RELIEF 50 MCG/ACT nasal spray 2 sprays into each nostril daily.  11   • gabapentin (NEURONTIN) 300 MG capsule Take 1 capsule by mouth 3 (Three) Times a Day. 90 capsule 2   • glycerin (ADULT) 2 g suppository rectal suppository Insert 2 g into the rectum.     • HYDROmorphone (DILAUDID) 4 MG tablet Take 1 tablet by mouth Every 4 (Four) Hours As Needed for Moderate Pain . 20 tablet 0   • lactulose (CHRONULAC) 10 GM/15ML solution Take 30 mL by mouth 2  (Two) Times a Day. 1892 mL 2   • mesalamine (LIALDA) 1.2 G EC tablet Take 1,200 mg by mouth 2 (two) times a day.     • nystatin (MYCOSTATIN) 407019 UNIT/GM ointment Apply  topically 2 (Two) Times a Day.     • nystatin (nystatin) 631847 UNIT/GM powder Apply  topically 4 (Four) Times a Day.     • omeprazole (PriLOSEC) 40 MG capsule TAKE 1 CAPSULE DAILY 90 capsule 2   • ondansetron ODT (ZOFRAN-ODT) 4 MG disintegrating tablet DIS 1 T ON THE TONGUE Q 8 H PRF NAUSEA  0   • polyethylene glycol (MIRALAX) packet Take 17 g by mouth Daily.     • psyllium (METAMUCIL) 58.6 % packet Take 1 packet by mouth Daily.     • saccharomyces boulardii (FLORASTOR) 250 MG capsule Take 250 mg by mouth 2 (Two) Times a Day.     • sennosides-docusate sodium (SENOKOT-S) 8.6-50 MG tablet Take 2 tablets by mouth 2 (Two) Times a Day. 90 tablet 2   • traMADol (ULTRAM) 50 MG tablet Take 1 tablet by mouth Every 8 (Eight) Hours As Needed for Moderate Pain . 30 tablet 0   • Tuberculin PPD (APLISOL ID) Inject  into the skin.       No current facility-administered medications on file prior to visit.        Allergies   Allergen Reactions   • Hydrocodone Nausea Only   • Morphine And Related Nausea And Vomiting         Primary care MD:    Jazmine Chanel MD    Oncologist:     Seen and approved by:  Cristhian Mehta MD  01/25/2018

## 2018-01-26 PROCEDURE — 77412 RADIATION TX DELIVERY LVL 3: CPT | Performed by: RADIOLOGY

## 2018-01-28 DIAGNOSIS — I10 ESSENTIAL HYPERTENSION: Primary | ICD-10-CM

## 2018-01-28 DIAGNOSIS — C79.51 BONE METASTASES: ICD-10-CM

## 2018-01-28 DIAGNOSIS — Z17.1 MALIGNANT NEOPLASM OF OVERLAPPING SITES OF RIGHT BREAST IN FEMALE, ESTROGEN RECEPTOR NEGATIVE (HCC): ICD-10-CM

## 2018-01-28 DIAGNOSIS — D72.829 LEUKOCYTOSIS, UNSPECIFIED TYPE: ICD-10-CM

## 2018-01-28 DIAGNOSIS — C50.811 MALIGNANT NEOPLASM OF OVERLAPPING SITES OF RIGHT BREAST IN FEMALE, ESTROGEN RECEPTOR NEGATIVE (HCC): ICD-10-CM

## 2018-01-29 PROCEDURE — 77412 RADIATION TX DELIVERY LVL 3: CPT | Performed by: RADIOLOGY

## 2018-01-29 PROCEDURE — 77336 RADIATION PHYSICS CONSULT: CPT | Performed by: RADIOLOGY

## 2018-01-30 ENCOUNTER — HOSPITAL ENCOUNTER (OUTPATIENT)
Dept: NUCLEAR MEDICINE | Facility: HOSPITAL | Age: 58
Discharge: HOME | End: 2018-01-30
Attending: INTERNAL MEDICINE

## 2018-01-30 ENCOUNTER — HOSPITAL ENCOUNTER (OUTPATIENT)
Dept: CT IMAGING | Facility: HOSPITAL | Age: 58
Discharge: HOME | End: 2018-01-30
Attending: INTERNAL MEDICINE | Admitting: INTERNAL MEDICINE

## 2018-01-30 VITALS
HEIGHT: 62 IN | OXYGEN SATURATION: 98 % | TEMPERATURE: 97.5 F | HEART RATE: 96 BPM | WEIGHT: 192 LBS | DIASTOLIC BLOOD PRESSURE: 62 MMHG | SYSTOLIC BLOOD PRESSURE: 100 MMHG | BODY MASS INDEX: 35.33 KG/M2 | RESPIRATION RATE: 16 BRPM

## 2018-01-30 DIAGNOSIS — C79.51 BONE METASTASES: ICD-10-CM

## 2018-01-30 DIAGNOSIS — Z17.1 MALIGNANT NEOPLASM OF OVERLAPPING SITES OF RIGHT BREAST IN FEMALE, ESTROGEN RECEPTOR NEGATIVE (HCC): ICD-10-CM

## 2018-01-30 DIAGNOSIS — C50.811 MALIGNANT NEOPLASM OF OVERLAPPING SITES OF RIGHT BREAST IN FEMALE, ESTROGEN RECEPTOR NEGATIVE (HCC): ICD-10-CM

## 2018-01-30 LAB — CREAT BLDA-MCNC: 0.5 MG/DL (ref 0.6–1.3)

## 2018-01-30 PROCEDURE — 0 IOPAMIDOL 61 % SOLUTION: Performed by: INTERNAL MEDICINE

## 2018-01-30 PROCEDURE — 74177 CT ABD & PELVIS W/CONTRAST: CPT

## 2018-01-30 PROCEDURE — A9503 TC99M MEDRONATE: HCPCS | Performed by: INTERNAL MEDICINE

## 2018-01-30 PROCEDURE — 82565 ASSAY OF CREATININE: CPT

## 2018-01-30 PROCEDURE — 78306 BONE IMAGING WHOLE BODY: CPT

## 2018-01-30 PROCEDURE — 0 TECHNETIUM MEDRONATE KIT: Performed by: INTERNAL MEDICINE

## 2018-01-30 PROCEDURE — 71260 CT THORAX DX C+: CPT

## 2018-01-30 RX ORDER — LORAZEPAM 1 MG/1
1 TABLET ORAL EVERY 8 HOURS PRN
COMMUNITY
End: 2018-03-20

## 2018-01-30 RX ORDER — TC 99M MEDRONATE 20 MG/10ML
19 INJECTION, POWDER, LYOPHILIZED, FOR SOLUTION INTRAVENOUS
Status: COMPLETED | OUTPATIENT
Start: 2018-01-30 | End: 2018-01-30

## 2018-01-30 RX ADMIN — IOPAMIDOL 85 ML: 612 INJECTION, SOLUTION INTRAVENOUS at 12:30

## 2018-01-30 RX ADMIN — Medication 19 MILLICURIE: at 11:22

## 2018-01-31 ENCOUNTER — PATIENT OUTREACH (OUTPATIENT)
Dept: CASE MANAGEMENT | Facility: OTHER | Age: 58
End: 2018-01-31

## 2018-01-31 ENCOUNTER — DOCUMENTATION (OUTPATIENT)
Dept: ONCOLOGY | Facility: CLINIC | Age: 58
End: 2018-01-31

## 2018-01-31 NOTE — OUTREACH NOTE
Skilled Nursing Facility Discharge Flowsheet:     Skilled Nursing Facility Discharge Assessment 1/31/2018   Acute Facility Discharged From -   Acute Discharge Date -   Name of the Skilled Nursing Facility? -   Tier Level of the Skilled Nursing Facility -   Purpose of SNF Admission -   Estimated length of stay for the patient? TBD   Who is the insurance provider or payor of patient stay? -   Progression of Patient? XRT sto L3 and left humerus completed.  Bone scan 1/30/18

## 2018-02-01 ENCOUNTER — TELEPHONE (OUTPATIENT)
Dept: ONCOLOGY | Facility: HOSPITAL | Age: 58
End: 2018-02-01

## 2018-02-01 NOTE — TELEPHONE ENCOUNTER
----- Message from Ubaldo Hancock Jr., MD sent at 1/31/2018  6:32 PM EST -----  Please contact patient/rehab center and make sure she is not having symptoms of uti or respiratory symptoms. CT shows possible inflammatory change in lung and possible pyelonephritis.  ----- Message -----     From: Interface, Rad Results [place] In     Sent: 1/31/2018   4:02 PM       To: Ubaldo Hancock Jr., MD        Called [facility]. Spoke with Kylah. She states Saritha (pt's nurse) is on a break. Informed kylah to see if pt has any UTI or URI symptoms and to please call us back. Kylah brown/chino. Spoke with Saritha, pt's nurse. She denies the pt having any symptoms, no fever, pain, cough, frequent or painful urination. Informed Dr. Hancock

## 2018-02-06 ENCOUNTER — OFFICE VISIT (OUTPATIENT)
Dept: ONCOLOGY | Facility: CLINIC | Age: 58
End: 2018-02-06

## 2018-02-06 ENCOUNTER — LAB (OUTPATIENT)
Dept: LAB | Facility: HOSPITAL | Age: 58
End: 2018-02-06

## 2018-02-06 VITALS
SYSTOLIC BLOOD PRESSURE: 122 MMHG | OXYGEN SATURATION: 96 % | TEMPERATURE: 98.7 F | HEART RATE: 90 BPM | RESPIRATION RATE: 16 BRPM | DIASTOLIC BLOOD PRESSURE: 74 MMHG

## 2018-02-06 DIAGNOSIS — E83.52 HYPERCALCEMIA OF MALIGNANCY: ICD-10-CM

## 2018-02-06 DIAGNOSIS — Z17.1 MALIGNANT NEOPLASM OF OVERLAPPING SITES OF RIGHT BREAST IN FEMALE, ESTROGEN RECEPTOR NEGATIVE (HCC): Primary | ICD-10-CM

## 2018-02-06 DIAGNOSIS — C79.51 BONE METASTASES: ICD-10-CM

## 2018-02-06 DIAGNOSIS — C50.811 MALIGNANT NEOPLASM OF OVERLAPPING SITES OF RIGHT BREAST IN FEMALE, ESTROGEN RECEPTOR NEGATIVE (HCC): ICD-10-CM

## 2018-02-06 DIAGNOSIS — R30.0 DYSURIA: Primary | ICD-10-CM

## 2018-02-06 DIAGNOSIS — Z17.1 MALIGNANT NEOPLASM OF OVERLAPPING SITES OF RIGHT BREAST IN FEMALE, ESTROGEN RECEPTOR NEGATIVE (HCC): ICD-10-CM

## 2018-02-06 DIAGNOSIS — C50.811 MALIGNANT NEOPLASM OF OVERLAPPING SITES OF RIGHT BREAST IN FEMALE, ESTROGEN RECEPTOR NEGATIVE (HCC): Primary | ICD-10-CM

## 2018-02-06 DIAGNOSIS — G89.3 CANCER ASSOCIATED PAIN: ICD-10-CM

## 2018-02-06 DIAGNOSIS — K59.03 DRUG INDUCED CONSTIPATION: ICD-10-CM

## 2018-02-06 LAB
ALBUMIN SERPL-MCNC: 3.1 G/DL (ref 3.5–5.2)
ALBUMIN/GLOB SERPL: 1 G/DL (ref 1.1–2.4)
ALP SERPL-CCNC: 92 U/L (ref 38–116)
ALT SERPL W P-5'-P-CCNC: 16 U/L (ref 0–33)
ANION GAP SERPL CALCULATED.3IONS-SCNC: 10.1 MMOL/L
AST SERPL-CCNC: 21 U/L (ref 0–32)
BASOPHILS # BLD AUTO: 0.03 10*3/MM3 (ref 0–0.1)
BASOPHILS NFR BLD AUTO: 0.6 % (ref 0–1.1)
BILIRUB SERPL-MCNC: 0.2 MG/DL (ref 0.1–1.2)
BILIRUB UR QL STRIP: NEGATIVE
BUN BLD-MCNC: 7 MG/DL (ref 6–20)
BUN/CREAT SERPL: 12.1 (ref 7.3–30)
CALCIUM SPEC-SCNC: 8.3 MG/DL (ref 8.5–10.2)
CANCER AG15-3 SERPL-ACNC: 15.4 U/ML
CHLORIDE SERPL-SCNC: 104 MMOL/L (ref 98–107)
CLARITY UR: ABNORMAL
CO2 SERPL-SCNC: 26.9 MMOL/L (ref 22–29)
COLOR UR: YELLOW
CREAT BLD-MCNC: 0.58 MG/DL (ref 0.6–1.1)
DEPRECATED RDW RBC AUTO: 61.5 FL (ref 37–49)
EOSINOPHIL # BLD AUTO: 0.36 10*3/MM3 (ref 0–0.36)
EOSINOPHIL NFR BLD AUTO: 7.6 % (ref 1–5)
ERYTHROCYTE [DISTWIDTH] IN BLOOD BY AUTOMATED COUNT: 16.9 % (ref 11.7–14.5)
GFR SERPL CREATININE-BSD FRML MDRD: 107 ML/MIN/1.73
GLOBULIN UR ELPH-MCNC: 3.1 GM/DL (ref 1.8–3.5)
GLUCOSE BLD-MCNC: 128 MG/DL (ref 74–124)
GLUCOSE UR STRIP-MCNC: NEGATIVE MG/DL
HCT VFR BLD AUTO: 37.1 % (ref 34–45)
HGB BLD-MCNC: 11.5 G/DL (ref 11.5–14.9)
HGB UR QL STRIP.AUTO: ABNORMAL
HOLD SPECIMEN: NORMAL
IMM GRANULOCYTES # BLD: 0.04 10*3/MM3 (ref 0–0.03)
IMM GRANULOCYTES NFR BLD: 0.8 % (ref 0–0.5)
KETONES UR QL STRIP: NEGATIVE
LEUKOCYTE ESTERASE UR QL STRIP.AUTO: ABNORMAL
LYMPHOCYTES # BLD AUTO: 0.58 10*3/MM3 (ref 1–3.5)
LYMPHOCYTES NFR BLD AUTO: 12.2 % (ref 20–49)
MCH RBC QN AUTO: 30.5 PG (ref 27–33)
MCHC RBC AUTO-ENTMCNC: 31 G/DL (ref 32–35)
MCV RBC AUTO: 98.4 FL (ref 83–97)
MONOCYTES # BLD AUTO: 0.43 10*3/MM3 (ref 0.25–0.8)
MONOCYTES NFR BLD AUTO: 9.1 % (ref 4–12)
NEUTROPHILS # BLD AUTO: 3.31 10*3/MM3 (ref 1.5–7)
NEUTROPHILS NFR BLD AUTO: 69.7 % (ref 39–75)
NITRITE UR QL STRIP: POSITIVE
NRBC BLD MANUAL-RTO: 0 /100 WBC (ref 0–0)
PH UR STRIP.AUTO: 7 [PH] (ref 4.5–8)
PLATELET # BLD AUTO: 344 10*3/MM3 (ref 150–375)
PMV BLD AUTO: 9.3 FL (ref 8.9–12.1)
POTASSIUM BLD-SCNC: 4.3 MMOL/L (ref 3.5–4.7)
PROT SERPL-MCNC: 6.2 G/DL (ref 6.3–8)
PROT UR QL STRIP: ABNORMAL
RBC # BLD AUTO: 3.77 10*6/MM3 (ref 3.9–5)
SODIUM BLD-SCNC: 141 MMOL/L (ref 134–145)
SP GR UR STRIP: 1.02 (ref 1–1.03)
UROBILINOGEN UR QL STRIP: ABNORMAL
WBC NRBC COR # BLD: 4.75 10*3/MM3 (ref 4–10)

## 2018-02-06 PROCEDURE — 87086 URINE CULTURE/COLONY COUNT: CPT | Performed by: INTERNAL MEDICINE

## 2018-02-06 PROCEDURE — 80053 COMPREHEN METABOLIC PANEL: CPT | Performed by: INTERNAL MEDICINE

## 2018-02-06 PROCEDURE — 87186 SC STD MICRODIL/AGAR DIL: CPT | Performed by: INTERNAL MEDICINE

## 2018-02-06 PROCEDURE — 85025 COMPLETE CBC W/AUTO DIFF WBC: CPT | Performed by: INTERNAL MEDICINE

## 2018-02-06 PROCEDURE — 36415 COLL VENOUS BLD VENIPUNCTURE: CPT | Performed by: INTERNAL MEDICINE

## 2018-02-06 PROCEDURE — 86300 IMMUNOASSAY TUMOR CA 15-3: CPT | Performed by: INTERNAL MEDICINE

## 2018-02-06 PROCEDURE — 81003 URINALYSIS AUTO W/O SCOPE: CPT | Performed by: INTERNAL MEDICINE

## 2018-02-06 PROCEDURE — 99215 OFFICE O/P EST HI 40 MIN: CPT | Performed by: INTERNAL MEDICINE

## 2018-02-06 PROCEDURE — 99212-NC PR NO CHARGE CBC OFFICE OUTPATIENT VISIT 10 MINUTES: Performed by: NURSE PRACTITIONER

## 2018-02-06 NOTE — PROGRESS NOTES
Subjective     No primary care provider on file.    PATIENT NAME:  Martha Davey  YOB: 1960  PATIENTS AGE:  57 y.o.  PATIENTS SEX:  female  DATE OF SERVICE:  02/06/2018  PROVIDER:  LISA Mckenzie      __________ORAL CHEMOTHERAPY PATIENT EDUCATION__________    PATIENT EDUCATION:  Today I met with the patient to discuss ORAL chemotherapy/biotherapy recommended for treatment of her disease.  Also discussed were medication administration, adherence, and proper handling/disposal.  The patient received the Oral Chemotherapy/Biotherapy Plan Summary including diagnosis and specific treatment plan.    SIDE EFFECTS:  Common side effects were discussed with the patient and/or significant other.  Discussion included hair loss/discoloration, anemia/fatigue, infection/chills/fever, appetite, bleeding risk/precautions, constipation, diarrhea, mouth sores, taste alteration, loss of appetite,nausea/vomiting, peripheral neuropathy, skin/nail changes, rash, muscle aches/weakness, photosensitivity, weight gain/loss, hearing loss, dizziness, menopausal symptoms, menstrrual irregularity, reproductive risk, high blood pressure, heart damage, liver damage, lung damage, kidney damage, DVT/PE risk, fluid retention, pleural/pericardial effusion, somnolence, electrolyte/LFT imbalance.    Discussion also included side effects specific to drugs in the treatment plan, specifically Xeloda.    A total of 25 minutes were spent with the patient, with 100% of time spent in education and counseling.  [facility] was also notified of dosing and an order for PRN imodium was given.        LISA Mckenzie

## 2018-02-07 ENCOUNTER — PATIENT OUTREACH (OUTPATIENT)
Dept: CASE MANAGEMENT | Facility: OTHER | Age: 58
End: 2018-02-07

## 2018-02-07 ENCOUNTER — TELEPHONE (OUTPATIENT)
Dept: ONCOLOGY | Facility: HOSPITAL | Age: 58
End: 2018-02-07

## 2018-02-07 NOTE — PROGRESS NOTES
Subjective .     REASONS FOR FOLLOWUP:    1. Stage Ib ([id number]) right breast cancer: Diagnosed May 2010 with excisional biopsy for invasive ductal carcinoma, 1.3 cm, grade 2, ER 90%, HI 80%, HER-2/peter negative (1+ IHC).  Subsequent right mastectomy in July 2010 with no residual breast malignancy, 1/5 sentinel lymph node with micrometastasis (0.25 mm).  Treated in the [facility] system with adjuvant AC ×4 cycles in 2010 (no taxanes administered due to underlying Charcot-Saloni-Tooth with peripheral neuropathy).  Adjuvant Femara (postmenopausal) initiated October 2010 with plan to treat ×10 years.  Genetic testing reportedly negative.  Developed osteopenia treated with Prolia beginning 2/27/13.  2. Recurrent/metastatic disease identified 10/8/17 involving thoracic spine with cord compression at T6, lumbosacral involvement, sternal and right sternoclavicular involvement.  Femara discontinued.  Radiation administered (in the [facility] system) to the thoracic spine beginning 10/19/17 treating T3-T9 to a dose of 24 gray in 6 fractions.  3. Evaluation with MRI 12/8/17 showing persistent T6 cord compression with persistent neurologic compromise requiring surgical treatment 12/11/17 with T6 laminectomy/corpectomy and T3-T9 fusion.  Pathology with metastatic carcinoma of breast origin, ER negative, HI negative, HER-2/peter negative (1+ IHC).  4. Right pulmonary embolism 10/21/17 continuing on Lovenox 1 mg/kg (100 mg) every 12 hours.  No evidence of DVT.  5. Cancer related pain previously on Duragesic 50 µg patch every 72 hours and Dilaudid 4 mg as needed for breakthrough pain.  Narcotics subsequently discontinued with improvement in pain in January 2018.  6. Radiation therapy to L3 dural metastasis and left humerus initiated 1/15/18 (10 fractions), completed 1/26/18  7. Monthly Xgeva initiated 1/23/18  8. Mild hypercalcemia of malignancy  9. Initiation of palliative oral single agent Xeloda 2/7/18 (2000 mg a.m., 1500 mg p.m. for  14/21 days).    HISTORY OF PRESENT ILLNESS:  The patient is a 57 y.o. year old female who is here for follow-up with the above-mentioned history.    History of Present Illness  the patient returns today in follow-up 10 urine to reside at [facility] for subacute rehabilitation.  In the interval since her last visit she completed radiation therapy to the L3 dural metastasis in the left humerus on 18.  She also initiated monthly Xgeva 18.  She did miss her appointment for chemotherapy education was scheduled for 18.  She has now received her shipment of oral Xeloda however has not yet initiated the medication.  The patient today reports that she has made further improvement in her functional status, is now able to stand for 6 minutes at a time and has begun walking a few steps with assistance.  Her appetite is somewhat diminished.  She does have mild constipation and 10 years on daily MiraLAX and Senokot 2 twice a day.  Her pain is stable and she is not requiring any narcotics currently..  He has not experienced any bleeding complications on anticoagulation with Lovenox 100 mg subcutaneous injection twice daily.    Past Medical History:   Diagnosis Date   • Acute pulmonary embolism, cancer-related 10/2017   • Allergic rhinitis    • Arthritis     Right knee; left shoulder   • Cancer 2010    Right breast   • Cancer 10/2017    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   • Charcot-Saloni-Tooth disease    • CPAP (continuous positive airway pressure) dependence    • GERD (gastroesophageal reflux disease)    • H/O Colon polyps    • H/O Uterine fibroid    • Heart murmur    • Hyperlipidemia    • Hypertension    • PONV (postoperative nausea and vomiting)      Past Surgical History:   Procedure Laterality Date   • BLADDER SURGERY      Bladder lift   • BREAST RECONSTRUCTION, BREAST TISSUE EXPANDER INSERTION Right    • BREAST SURGERY Right 2010    Mastectomy   •  SECTION  ,    •  CHOLECYSTECTOMY     • COLONOSCOPY  2010   • HERNIA REPAIR  08/2015    [facility]   • HYSTERECTOMY      Partial   • KNEE SURGERY Right    • LEG SURGERY Left     Broken   • MASTECTOMY Right 2010   • UT TREAT TIBIAL SHAFT FX, INTRAMED IMPLANT Left 12/6/2017    Procedure: TIBIA INTRAMEDULLARY NAIL/DON INSERTION;  Surgeon: Romero Shanks MD;  Location: [facility];  Service: Orthopedics   • THORACIC DECOMPRESSION POSTERIOR FUSION WITH INSTRUMENTATION N/A 12/11/2017    Procedure: T6 costotransversectomy and decompression with T3 to  T9 posterior spinal fusion with instrumentation with Jennifer Gonzalez and Nael Wilkes;  Surgeon: Quan Nayak MD;  Location: [facility];  Service:        ONCOLOGIC HISTORY:  (History from previous dates can be found in the separate document.)    Current Outpatient Prescriptions on File Prior to Visit   Medication Sig Dispense Refill   • acetaminophen (TYLENOL) 500 MG tablet Take 500 mg by mouth Every 6 (Six) Hours As Needed for Mild Pain .     • atorvastatin (LIPITOR) 10 MG tablet Take 1 tablet by mouth Daily. 90 tablet 3   • capecitabine (XELODA) 500 MG chemo tablet Take 4 tabs (2000 mg) in the AM then take 3 tabs (1500 mg) in the PM for 14 days on then 7 days off. 98 tablet 3   • cyclobenzaprine (FLEXERIL) 10 MG tablet Take 1 tablet by mouth 3 (Three) Times a Day As Needed for Muscle Spasms. 30 tablet 3   • DULoxetine (CYMBALTA) 20 MG capsule Take 20 mg by mouth Daily.     • enoxaparin (LOVENOX) 100 MG/ML solution syringe Inject 1 mg/kg under the skin Every 12 (Twelve) Hours.     • fentaNYL (DURAGESIC) 50 MCG/HR patch Place 1 patch on the skin Every 72 (Seventy-Two) Hours. 3 patch 0   • FLONASE ALLERGY RELIEF 50 MCG/ACT nasal spray 2 sprays into each nostril daily.  11   • gabapentin (NEURONTIN) 300 MG capsule Take 1 capsule by mouth 3 (Three) Times a Day. 90 capsule 2   • glycerin (ADULT) 2 g suppository rectal suppository Insert 2 g into the rectum.     • HYDROmorphone  (DILAUDID) 4 MG tablet Take 1 tablet by mouth Every 4 (Four) Hours As Needed for Moderate Pain . 20 tablet 0   • lactulose (CHRONULAC) 10 GM/15ML solution Take 30 mL by mouth 2 (Two) Times a Day. 1892 mL 2   • LORazepam (ATIVAN) 1 MG tablet Take 1 mg by mouth Every 8 (Eight) Hours As Needed for Anxiety.     • mesalamine (LIALDA) 1.2 G EC tablet Take 1,200 mg by mouth 2 (two) times a day.     • nystatin (MYCOSTATIN) 137502 UNIT/GM ointment Apply  topically 2 (Two) Times a Day.     • nystatin (nystatin) 617415 UNIT/GM powder Apply  topically 4 (Four) Times a Day.     • omeprazole (PriLOSEC) 40 MG capsule TAKE 1 CAPSULE DAILY 90 capsule 2   • ondansetron ODT (ZOFRAN-ODT) 4 MG disintegrating tablet DIS 1 T ON THE TONGUE Q 8 H PRF NAUSEA  0   • polyethylene glycol (MIRALAX) packet Take 17 g by mouth Daily.     • psyllium (METAMUCIL) 58.6 % packet Take 1 packet by mouth Daily.     • saccharomyces boulardii (FLORASTOR) 250 MG capsule Take 250 mg by mouth 2 (Two) Times a Day.     • sennosides-docusate sodium (SENOKOT-S) 8.6-50 MG tablet Take 2 tablets by mouth 2 (Two) Times a Day. 90 tablet 2   • traMADol (ULTRAM) 50 MG tablet Take 1 tablet by mouth Every 8 (Eight) Hours As Needed for Moderate Pain . 30 tablet 0   • Tuberculin PPD (APLISOL ID) Inject  into the skin.       No current facility-administered medications on file prior to visit.        ALLERGIES:     Allergies   Allergen Reactions   • Hydrocodone Nausea Only   • Morphine And Related Nausea And Vomiting     Social History     Social History   • Marital status:      Spouse name: Murray   • Number of children: 3   • Years of education: College     Occupational History   •  Retired     Social History Main Topics   • Smoking status: Never Smoker   • Smokeless tobacco: Never Used   • Alcohol use Yes      Comment: social   • Drug use: No   • Sexual activity: Defer     Other Topics Concern   • Not on file     Social History Narrative     Family History   Problem  Relation Age of Onset   • Heart disease Mother    • Hyperlipidemia Mother    • Hypertension Mother    • Diabetes Father    • Charcot-Saloni-Tooth disease Father    • Heart disease Father    • Hypertension Father    • Heart failure Father    • Diabetes Sister    • Heart disease Sister    • Hypertension Sister    • Heart disease Maternal Aunt    • Scoliosis Maternal Aunt    • Heart disease Maternal Uncle    • Diabetes Maternal Grandmother    • Heart disease Maternal Grandmother    • Hypertension Maternal Grandmother    • Uterine cancer Maternal Grandmother    • Heart disease Maternal Grandfather      Review of Systems   Constitutional: Positive for activity change and fatigue. Negative for appetite change, fever and unexpected weight change.   HENT: Negative for congestion, mouth sores, nosebleeds, sore throat and voice change.    Respiratory: Negative for cough, shortness of breath and wheezing.    Cardiovascular: Negative for chest pain, palpitations and leg swelling.   Gastrointestinal: Positive for constipation. Negative for abdominal distention, abdominal pain, blood in stool, diarrhea, nausea and vomiting.   Endocrine: Negative for cold intolerance and heat intolerance.   Genitourinary: Negative for difficulty urinating, dysuria, frequency and hematuria.   Musculoskeletal: Positive for back pain. Negative for arthralgias, joint swelling and myalgias.   Skin: Negative for rash.   Neurological: Negative for dizziness, syncope, weakness, light-headedness, numbness and headaches.   Hematological: Negative for adenopathy. Does not bruise/bleed easily.   Psychiatric/Behavioral: Negative for confusion and sleep disturbance. The patient is not nervous/anxious.          Objective      Vitals:    02/06/18 1137   BP: 122/74   Pulse: 90   Resp: 16   Temp: 98.7 °F (37.1 °C)   SpO2: 96%      Current Status 2/6/2018   ECOG score 2   Pain 3/10    Physical Exam   Constitutional: She is oriented to person, place, and time.   The  patient is currently seated in a wheelchair with back brace in place (removed for exam).   HENT:   Oral thrush resolved.   Eyes: Conjunctivae are normal.   Neck: No thyromegaly present.   Cardiovascular: Normal rate and regular rhythm.  Exam reveals no gallop and no friction rub.    No murmur heard.  Pulmonary/Chest: Breath sounds normal. No respiratory distress.   Abdominal: Soft. Bowel sounds are normal. She exhibits no distension. There is no tenderness.   Musculoskeletal: She exhibits no edema.   Boot in place left lower extremity   Lymphadenopathy:        Head (right side): No submandibular adenopathy present.     She has no cervical adenopathy.     She has no axillary adenopathy.        Right: No inguinal and no supraclavicular adenopathy present.        Left: No inguinal and no supraclavicular adenopathy present.   Neurological: She is alert and oriented to person, place, and time. No cranial nerve deficit.   Bilateral lower extremity strength, 3+ /5   Skin: Skin is warm and dry. No rash noted.   Psychiatric: She has a normal mood and affect. Her behavior is normal.       RECENT LABS:  Hematology WBC   Date Value Ref Range Status   02/06/2018 4.75 4.00 - 10.00 10*3/mm3 Final     RBC   Date Value Ref Range Status   02/06/2018 3.77 (L) 3.90 - 5.00 10*6/mm3 Final     Hemoglobin   Date Value Ref Range Status   02/06/2018 11.5 11.5 - 14.9 g/dL Final     Hematocrit   Date Value Ref Range Status   02/06/2018 37.1 34.0 - 45.0 % Final     Platelets   Date Value Ref Range Status   02/06/2018 344 150 - 375 10*3/mm3 Final        Lab Results   Component Value Date    GLUCOSE 128 (H) 02/06/2018    BUN 7 02/06/2018    CREATININE 0.58 (L) 02/06/2018    EGFRIFNONA 107 02/06/2018    EGFRIFAFRI 80 09/25/2017    BCR 12.1 02/06/2018    K 4.3 02/06/2018    CO2 26.9 02/06/2018    CALCIUM 8.3 (L) 02/06/2018    PROTENTOTREF 6.4 09/25/2017    ALBUMIN 3.10 (L) 02/06/2018    LABIL2 1.0 (L) 02/06/2018    AST 21 02/06/2018    ALT 16  02/06/2018     CT chest abdomen and pelvis 1/30/18: I did personally review the CT images in the computer today.  IMPRESSION:  1. The new cluster of nodular opacities within the right lower lobe is  suspected to represent infectious infiltrates or bronchiolitis. The new  atelectatic change at the medial segment of the right middle lobe is  also suspected to be benign. Follow-up is recommended with a chest CT in  3 months.  2. There is new thickening of the wall of the left renal pelvis and left  ureter to the level of the urinary bladder which is circumferentially  thickened. The appearance is suggestive of UTI or early pyelonephritis.  Please correlate clinically.  3. Constipation is suspected.    Bone scan 1/30/18:  IMPRESSION:  No evidence for new osseous metastatic disease. There is  progressive uptake within the thoracic spine that is attributed to  multilevel fusion and instrumentation that has been performed since the  previous bone scan. No other change.    Assessment/Plan      1. Previous Stage Ib ([id number]) right breast cancer:  · Diagnosed May 2010 with excisional biopsy for invasive ductal carcinoma, 1.3 cm, grade 2, ER 90%, UT 80%, HER-2/peter negative (1+ IHC).    · Subsequent right mastectomy in July 2010 with no residual breast malignancy, 1/5 sentinel lymph node with micrometastasis (0.25 mm).    · Treated in the [facility] system with adjuvant AC ×4 cycles in 2010 (no taxanes administered due to underlying Charcot-Saloni-Tooth with peripheral neuropathy).    · Adjuvant Femara (postmenopausal) initiated October 2010 with plan to treat ×10 years.    · Genetic testing reportedly negative.    · Developed osteopenia treated with Prolia beginning 2/27/13. Subsequently discontinued due to identification of metastatic disease.  2. Recurrent/metastatic disease identified 10/8/17:  · Disease involving thoracic spine with cord compression at T6, lumbosacral involvement, sternal and right sternoclavicular  involvement.    · Femara discontinued in 10/2017.    · Radiation administered (in the [facility] system) to the thoracic spine beginning 10/19/17 treating T3-T9 to a dose of 24 gray in 6 fractions.  · Evaluation with MRI 12/8/17 showing persistent T6 cord compression with persistent neurologic compromise requiring surgical treatment 12/11/17 with T6 laminectomy/corpectomy and T3-T9 fusion.  Pathology with metastatic carcinoma of breast origin, ER negative, IL negative, HER-2/peter negative (1+ IHC).  · Additional staging evaluation 12/8/17 with no evidence of visceral metastatic disease, bone scan showing involvement of thoracic spine, sternum, left humerus, mid frontal bone.  No plane film correlate of left humerus lesion.  MRI lumbar spine with small intradural L3 metastasis.  CA 15-3 12/6/17- 17.  · Palliative radiation therapy to L3 dural metastasis and left humerus initiated 1/15/18 (10 fractions), completed 1/26/18.  · Hypercalcemia of malignancy with calcium in the 10-11 range.  · Initiation of monthly Xgeva 1/23/18.  · The patient returns today in follow-up with new baseline CT scan and bone scan to review from 1/30/18 with plans to potentially initiate single agent palliative oral Xeloda chemotherapy.  The patient has shown some continued improvement in subacute rehabilitation although her progress is extremely slow and she remains very limited given her neurologic compromise in her lower extremities.  Her CT scan shows no evidence of visceral involvement.  There was a cluster of nodular opacities in the right lower lobe suspected to be infectious or related to bronchiolitis.  The patient is asymptomatic from a pulmonary standpoint.  There is also thickening in the left renal pelvis and left ureter raising the question of possible UTI/pyelonephritis and this will be discussed further below.  Bone scan 1/30/18 showed postsurgical change in the thoracic spine, stable uptake in the frontal bone, no new areas of  disease.  At this point, I have recommended that we go ahead and pursue initiation of chemotherapy with single agent oral Xeloda as we have discussed before.  We did discuss side effects of treatment today including nausea vomiting fatigue myelosuppression with risk of infection hand-foot syndrome mucositis, diarrhea.  The patient did not come in for a formal chemotherapy education class however our nurse practitioner will perform formal chemotherapy education today while the patient is here.  She will begin treatment tomorrow.  We are contacting the nursing facility to ensure proper administration of the medication.  We will have close follow-up during her first cycle with a nurse practitioner visit in 1 week and again in 2 weeks (when she will also be due for Xgeva).  I will see her in 3 weeks when she is ready to begin cycle 2.  3. Right pulmonary embolism:  · Diagnosed on CT angiogram 10/21/17 involving small right lower lobe pulmonary artery.  Lower extremity Dopplers negative.  · Bilateral lower extremity Dopplers negative again 12/5/17.  · Continuing on Lovenox 1 mg/kg (100 mg) subcutaneous injection every 12 hours.  4. Cancer related pain:  · Previously receiving Duragesic 50 µg patch every 72 hours along with Dilaudid 4 mg as needed for breakthrough pain  · The patient's pain improved over time and she was able to discontinue both Duragesic and Dilaudid in the interval.  5. Constipation:  · Currently receiving lactulose 20 g twice a day, MiraLAX daily, Senokot 2 tablets twice daily.  Constipation is currently mild.  6. Thrush:  · Currently resolved.    7. Traumatic left tibia/fibular fracture:  · Status post ORIF 12/6/17  · Specimen was sent for pathologic review, negative for evidence of malignancy  · Wound is healing well per orthopedics, boot remains in place.  8. Hypercalcemia:  · Suspect hypercalcemia of malignancy, calcium in the 10-11 range previously.  · Current calcium improved to 8.3 following  initiation of monthly Xgeva on 1/23/18.  9. Antiemetics:  · The patient has Zofran ordered at the rehabilitation facility to use as needed.  10. Possible UTI:  · CT 1/30/18 with thickening of the left renal pelvis and left ureter to the level of the bladder with circumferential thickening.  The patient does note some mild dysuria currently.  She does have history of frequent urinary tract infections.  She does note having been on an antibiotic recently at the nursing facility.  We will check a urinalysis and urine culture today.    Plan:  1. Formal chemotherapy education today in the office  2. Urinalysis and urine culture pending today  3. Tomorrow, the patient will begin oral palliative single agent Xeloda at the nursing facility.  We are communicating with the nursing facility today to ensure accurate dosing.  She will receive 2000 mg in the morning, 1500 mg in the evening for 14/21 days.  4. In 1 week nurse practitioner visit with CBC  5. In 2 weeks nurse practitioner visit with CBC, CMP, magnesium, phosphorus and monthly Xgeva  6. In 3 weeks M.D. visit CBC, CMP and the patient will be due to begin cycle 2 oral Xeloda.

## 2018-02-07 NOTE — OUTREACH NOTE
Skilled Nursing Facility Discharge Flowsheet:     Skilled Nursing Facility Discharge Assessment 2/7/2018   Acute Facility Discharged From [facility]   Acute Discharge Date 12/16/2017   Name of the Skilled Nursing Facility? [facility]   Tier Level of the Skilled Nursing Facility 1   Purpose of SNF Admission PT;OT;SN;WC   Estimated length of stay for the patient? TBD   Who is the insurance provider or payor of patient stay? Medicare   Progression of Patient? Daily therapies

## 2018-02-07 NOTE — TELEPHONE ENCOUNTER
Spoke with Saritha LEE at [facility].  Orders given r/v.     ----- Message from Ubaldo Hancock Jr., MD sent at 2/7/2018 10:01 AM EST -----  Please call in antibiotic to rehab facility levaquin 500mg x 7 days  ----- Message -----     From: Lab, Background User     Sent: 2/6/2018   1:39 PM       To: Ubaldo Hancock Jr., MD

## 2018-02-09 LAB
BACTERIA SPEC AEROBE CULT: ABNORMAL
BACTERIA SPEC AEROBE CULT: ABNORMAL

## 2018-02-12 ENCOUNTER — DOCUMENTATION (OUTPATIENT)
Dept: RADIATION ONCOLOGY | Facility: HOSPITAL | Age: 58
End: 2018-02-12

## 2018-02-13 ENCOUNTER — OFFICE VISIT (OUTPATIENT)
Dept: ONCOLOGY | Facility: CLINIC | Age: 58
End: 2018-02-13

## 2018-02-13 ENCOUNTER — LAB (OUTPATIENT)
Dept: LAB | Facility: HOSPITAL | Age: 58
End: 2018-02-13

## 2018-02-13 VITALS
OXYGEN SATURATION: 98 % | TEMPERATURE: 98.5 F | DIASTOLIC BLOOD PRESSURE: 72 MMHG | SYSTOLIC BLOOD PRESSURE: 122 MMHG | RESPIRATION RATE: 12 BRPM | HEART RATE: 92 BPM

## 2018-02-13 DIAGNOSIS — C79.51 BONE METASTASES: ICD-10-CM

## 2018-02-13 DIAGNOSIS — Z17.1 MALIGNANT NEOPLASM OF OVERLAPPING SITES OF RIGHT BREAST IN FEMALE, ESTROGEN RECEPTOR NEGATIVE (HCC): Primary | ICD-10-CM

## 2018-02-13 DIAGNOSIS — E83.52 HYPERCALCEMIA OF MALIGNANCY: ICD-10-CM

## 2018-02-13 DIAGNOSIS — C50.811 MALIGNANT NEOPLASM OF OVERLAPPING SITES OF RIGHT BREAST IN FEMALE, ESTROGEN RECEPTOR NEGATIVE (HCC): ICD-10-CM

## 2018-02-13 DIAGNOSIS — G89.3 CANCER ASSOCIATED PAIN: ICD-10-CM

## 2018-02-13 DIAGNOSIS — Z17.1 MALIGNANT NEOPLASM OF OVERLAPPING SITES OF RIGHT BREAST IN FEMALE, ESTROGEN RECEPTOR NEGATIVE (HCC): ICD-10-CM

## 2018-02-13 DIAGNOSIS — R30.0 DYSURIA: ICD-10-CM

## 2018-02-13 DIAGNOSIS — C50.811 MALIGNANT NEOPLASM OF OVERLAPPING SITES OF RIGHT BREAST IN FEMALE, ESTROGEN RECEPTOR NEGATIVE (HCC): Primary | ICD-10-CM

## 2018-02-13 LAB
BASOPHILS # BLD AUTO: 0.13 10*3/MM3 (ref 0–0.1)
BASOPHILS NFR BLD AUTO: 2.4 % (ref 0–1.1)
DEPRECATED RDW RBC AUTO: 57.7 FL (ref 37–49)
EOSINOPHIL # BLD AUTO: 0.43 10*3/MM3 (ref 0–0.36)
EOSINOPHIL NFR BLD AUTO: 7.9 % (ref 1–5)
ERYTHROCYTE [DISTWIDTH] IN BLOOD BY AUTOMATED COUNT: 16.6 % (ref 11.7–14.5)
HCT VFR BLD AUTO: 36.6 % (ref 34–45)
HGB BLD-MCNC: 11.8 G/DL (ref 11.5–14.9)
IMM GRANULOCYTES # BLD: 0.55 10*3/MM3 (ref 0–0.03)
IMM GRANULOCYTES NFR BLD: 10.1 % (ref 0–0.5)
LYMPHOCYTES # BLD AUTO: 0.55 10*3/MM3 (ref 1–3.5)
LYMPHOCYTES NFR BLD AUTO: 10.1 % (ref 20–49)
MCH RBC QN AUTO: 31 PG (ref 27–33)
MCHC RBC AUTO-ENTMCNC: 32.2 G/DL (ref 32–35)
MCV RBC AUTO: 96.1 FL (ref 83–97)
MONOCYTES # BLD AUTO: 0.36 10*3/MM3 (ref 0.25–0.8)
MONOCYTES NFR BLD AUTO: 6.6 % (ref 4–12)
NEUTROPHILS # BLD AUTO: 3.4 10*3/MM3 (ref 1.5–7)
NEUTROPHILS NFR BLD AUTO: 62.9 % (ref 39–75)
NRBC BLD MANUAL-RTO: 3.5 /100 WBC (ref 0–0)
PLATELET # BLD AUTO: 305 10*3/MM3 (ref 150–375)
PMV BLD AUTO: 10 FL (ref 8.9–12.1)
RBC # BLD AUTO: 3.81 10*6/MM3 (ref 3.9–5)
WBC NRBC COR # BLD: 5.42 10*3/MM3 (ref 4–10)

## 2018-02-13 PROCEDURE — 36416 COLLJ CAPILLARY BLOOD SPEC: CPT | Performed by: INTERNAL MEDICINE

## 2018-02-13 PROCEDURE — 99214 OFFICE O/P EST MOD 30 MIN: CPT | Performed by: NURSE PRACTITIONER

## 2018-02-13 PROCEDURE — 85025 COMPLETE CBC W/AUTO DIFF WBC: CPT | Performed by: INTERNAL MEDICINE

## 2018-02-13 NOTE — PROGRESS NOTES
Subjective .     REASONS FOR FOLLOWUP:    1. Stage Ib ([id number]) right breast cancer: Diagnosed May 2010 with excisional biopsy for invasive ductal carcinoma, 1.3 cm, grade 2, ER 90%, VT 80%, HER-2/peter negative (1+ IHC).  Subsequent right mastectomy in July 2010 with no residual breast malignancy, 1/5 sentinel lymph node with micrometastasis (0.25 mm).  Treated in the [facility] system with adjuvant AC ×4 cycles in 2010 (no taxanes administered due to underlying Charcot-Saloni-Tooth with peripheral neuropathy).  Adjuvant Femara (postmenopausal) initiated October 2010 with plan to treat ×10 years.  Genetic testing reportedly negative.  Developed osteopenia treated with Prolia beginning 2/27/13.  2. Recurrent/metastatic disease identified 10/8/17 involving thoracic spine with cord compression at T6, lumbosacral involvement, sternal and right sternoclavicular involvement.  Femara discontinued.  Radiation administered (in the [facility] system) to the thoracic spine beginning 10/19/17 treating T3-T9 to a dose of 24 gray in 6 fractions.  3. Evaluation with MRI 12/8/17 showing persistent T6 cord compression with persistent neurologic compromise requiring surgical treatment 12/11/17 with T6 laminectomy/corpectomy and T3-T9 fusion.  Pathology with metastatic carcinoma of breast origin, ER negative, VT negative, HER-2/peter negative (1+ IHC).  4. Right pulmonary embolism 10/21/17 continuing on Lovenox 1 mg/kg (100 mg) every 12 hours.  No evidence of DVT.  5. Cancer related pain previously on Duragesic 50 µg patch every 72 hours and Dilaudid 4 mg as needed for breakthrough pain.  Narcotics subsequently discontinued with improvement in pain in January 2018.  6. Radiation therapy to L3 dural metastasis and left humerus initiated 1/15/18 (10 fractions), completed 1/26/18  7. Monthly Xgeva initiated 1/23/18  8. Mild hypercalcemia of malignancy  9. Initiation of palliative oral single agent Xeloda 2/7/18 (2000 mg a.m., 1500 mg p.m. for  14/21 days).    HISTORY OF PRESENT ILLNESS:  The patient is a 57 y.o. year old female who is here for follow-up with the above-mentioned history.    History of Present Illness  the patient returns today in follow-up and continues to reside at [facility] [place].  She Xeloda approximately one week ago.  She is taking this twice daily within 30 minutes of eating though states she has to remind the nurses to bring it to her in the evenings frequently.  She does appear to be on top of this however.  She denies any nausea or vomiting.  She denies diminished appetite.  She does have increased gas though this is not painful.  She's had a couple loose stools but denies diarrhea.  She denies fevers.  She denies hand-foot syndrome.  She is using lotion at least twice daily.    She continues to progress through her rehabilitation for her report.    Past Medical History:   Diagnosis Date   • Acute pulmonary embolism, cancer-related 10/2017   • Allergic rhinitis    • Arthritis     Right knee; left shoulder   • Cancer 2010    Right breast   • Cancer 10/2017    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   • Charcot-Saloni-Tooth disease    • CPAP (continuous positive airway pressure) dependence    • GERD (gastroesophageal reflux disease)    • H/O Colon polyps    • H/O Uterine fibroid    • Heart murmur    • Hyperlipidemia    • Hypertension    • PONV (postoperative nausea and vomiting)      Past Surgical History:   Procedure Laterality Date   • BLADDER SURGERY      Bladder lift   • BREAST RECONSTRUCTION, BREAST TISSUE EXPANDER INSERTION Right    • BREAST SURGERY Right 2010    Mastectomy   •  SECTION  ,    • CHOLECYSTECTOMY     • COLONOSCOPY     • HERNIA REPAIR  2015    [facility]   • HYSTERECTOMY      Partial   • KNEE SURGERY Right    • LEG SURGERY Left     Broken   • MASTECTOMY Right    • CO TREAT TIBIAL SHAFT FX, INTRAMED IMPLANT Left 2017    Procedure: TIBIA INTRAMEDULLARY  NAIL/DON INSERTION;  Surgeon: Romeor Shanks MD;  Location: [facility];  Service: Orthopedics   • THORACIC DECOMPRESSION POSTERIOR FUSION WITH INSTRUMENTATION N/A 12/11/2017    Procedure: T6 costotransversectomy and decompression with T3 to  T9 posterior spinal fusion with instrumentation with Jennifer Gonzalez and Nael Wilkes;  Surgeon: Quan Nayak MD;  Location: [facility];  Service:        ONCOLOGIC HISTORY:  (History from previous dates can be found in the separate document.)    Current Outpatient Prescriptions on File Prior to Visit   Medication Sig Dispense Refill   • acetaminophen (TYLENOL) 500 MG tablet Take 500 mg by mouth Every 6 (Six) Hours As Needed for Mild Pain .     • atorvastatin (LIPITOR) 10 MG tablet Take 1 tablet by mouth Daily. 90 tablet 3   • capecitabine (XELODA) 500 MG chemo tablet Take 4 tabs (2000 mg) in the AM then take 3 tabs (1500 mg) in the PM for 14 days on then 7 days off. 98 tablet 3   • cyclobenzaprine (FLEXERIL) 10 MG tablet Take 1 tablet by mouth 3 (Three) Times a Day As Needed for Muscle Spasms. 30 tablet 3   • DULoxetine (CYMBALTA) 20 MG capsule Take 20 mg by mouth Daily.     • enoxaparin (LOVENOX) 100 MG/ML solution syringe Inject 1 mg/kg under the skin Every 12 (Twelve) Hours.     • FLONASE ALLERGY RELIEF 50 MCG/ACT nasal spray 2 sprays into each nostril daily.  11   • gabapentin (NEURONTIN) 300 MG capsule Take 1 capsule by mouth 3 (Three) Times a Day. 90 capsule 2   • glycerin (ADULT) 2 g suppository rectal suppository Insert 2 g into the rectum.     • HYDROmorphone (DILAUDID) 4 MG tablet Take 1 tablet by mouth Every 4 (Four) Hours As Needed for Moderate Pain . 20 tablet 0   • lactulose (CHRONULAC) 10 GM/15ML solution Take 30 mL by mouth 2 (Two) Times a Day. 1892 mL 2   • LORazepam (ATIVAN) 1 MG tablet Take 1 mg by mouth Every 8 (Eight) Hours As Needed for Anxiety.     • mesalamine (LIALDA) 1.2 G EC tablet Take 1,200 mg by mouth 2 (two) times a day.     •  nystatin (MYCOSTATIN) 988512 UNIT/GM ointment Apply  topically 2 (Two) Times a Day.     • nystatin (nystatin) 673685 UNIT/GM powder Apply  topically 4 (Four) Times a Day.     • omeprazole (PriLOSEC) 40 MG capsule TAKE 1 CAPSULE DAILY 90 capsule 2   • ondansetron ODT (ZOFRAN-ODT) 4 MG disintegrating tablet DIS 1 T ON THE TONGUE Q 8 H PRF NAUSEA  0   • polyethylene glycol (MIRALAX) packet Take 17 g by mouth Daily.     • psyllium (METAMUCIL) 58.6 % packet Take 1 packet by mouth Daily.     • saccharomyces boulardii (FLORASTOR) 250 MG capsule Take 250 mg by mouth 2 (Two) Times a Day.     • sennosides-docusate sodium (SENOKOT-S) 8.6-50 MG tablet Take 2 tablets by mouth 2 (Two) Times a Day. 90 tablet 2   • traMADol (ULTRAM) 50 MG tablet Take 1 tablet by mouth Every 8 (Eight) Hours As Needed for Moderate Pain . 30 tablet 0   • Tuberculin PPD (APLISOL ID) Inject  into the skin.       No current facility-administered medications on file prior to visit.        ALLERGIES:     Allergies   Allergen Reactions   • Hydrocodone Nausea Only   • Morphine And Related Nausea And Vomiting     Social History     Social History   • Marital status:      Spouse name: Murray   • Number of children: 3   • Years of education: College     Occupational History   •  Retired     Social History Main Topics   • Smoking status: Never Smoker   • Smokeless tobacco: Never Used   • Alcohol use Yes      Comment: social   • Drug use: No   • Sexual activity: Defer     Other Topics Concern   • Not on file     Social History Narrative     Family History   Problem Relation Age of Onset   • Heart disease Mother    • Hyperlipidemia Mother    • Hypertension Mother    • Diabetes Father    • Charcot-Saloni-Tooth disease Father    • Heart disease Father    • Hypertension Father    • Heart failure Father    • Diabetes Sister    • Heart disease Sister    • Hypertension Sister    • Heart disease Maternal Aunt    • Scoliosis Maternal Aunt    • Heart disease  Maternal Uncle    • Diabetes Maternal Grandmother    • Heart disease Maternal Grandmother    • Hypertension Maternal Grandmother    • Uterine cancer Maternal Grandmother    • Heart disease Maternal Grandfather      Review of Systems   Constitutional: Positive for activity change (Continues to use wheelchair for mobility) and fatigue. Negative for appetite change, fever and unexpected weight change.   HENT: Negative for congestion, mouth sores, nosebleeds, sore throat and voice change.    Respiratory: Negative for cough, shortness of breath and wheezing.    Cardiovascular: Negative for chest pain, palpitations and leg swelling.   Gastrointestinal: Negative for abdominal distention, abdominal pain, blood in stool, constipation, diarrhea (loose stools, but denies diarrhea.), nausea and vomiting.   Endocrine: Negative for cold intolerance and heat intolerance.   Genitourinary: Negative for difficulty urinating, dysuria, frequency and hematuria.   Musculoskeletal: Positive for back pain. Negative for arthralgias, joint swelling and myalgias.   Skin: Negative for color change and rash.   Neurological: Negative for dizziness, syncope, weakness, light-headedness, numbness and headaches.   Hematological: Negative for adenopathy. Does not bruise/bleed easily.   Psychiatric/Behavioral: Negative for confusion and sleep disturbance. The patient is not nervous/anxious.          Objective      Vitals:    02/13/18 1108   BP: 122/72   Pulse: 92   Resp: 12   Temp: 98.5 °F (36.9 °C)   SpO2: 98%      Current Status 2/13/2018   ECOG score 2   Pain 3/10    Physical Exam   Constitutional: She is oriented to person, place, and time.   The patient is currently seated in a wheelchair with back brace in place.   HENT:   Mouth/Throat: Oropharynx is clear and moist and mucous membranes are normal.   Eyes: Conjunctivae and EOM are normal. Pupils are equal, round, and reactive to light.   Cardiovascular: Normal rate and regular rhythm.     Pulmonary/Chest: Breath sounds normal. No respiratory distress.   Abdominal: Soft. Bowel sounds are normal.   Musculoskeletal: She exhibits no edema.   Boot in place left lower extremity   Neurological: She is alert and oriented to person, place, and time.   Skin: Skin is warm and dry. No rash noted.   Psychiatric: She has a normal mood and affect. Her behavior is normal.       RECENT LABS:  Hematology WBC   Date Value Ref Range Status   02/13/2018 5.42 4.00 - 10.00 10*3/mm3 Final     RBC   Date Value Ref Range Status   02/13/2018 3.81 (L) 3.90 - 5.00 10*6/mm3 Final     Hemoglobin   Date Value Ref Range Status   02/13/2018 11.8 11.5 - 14.9 g/dL Final     Hematocrit   Date Value Ref Range Status   02/13/2018 36.6 34.0 - 45.0 % Final     Platelets   Date Value Ref Range Status   02/13/2018 305 150 - 375 10*3/mm3 Final        Lab Results   Component Value Date    GLUCOSE 128 (H) 02/06/2018    BUN 7 02/06/2018    CREATININE 0.58 (L) 02/06/2018    EGFRIFNONA 107 02/06/2018    EGFRIFAFRI 80 09/25/2017    BCR 12.1 02/06/2018    K 4.3 02/06/2018    CO2 26.9 02/06/2018    CALCIUM 8.3 (L) 02/06/2018    PROTENTOTREF 6.4 09/25/2017    ALBUMIN 3.10 (L) 02/06/2018    LABIL2 1.0 (L) 02/06/2018    AST 21 02/06/2018    ALT 16 02/06/2018         Assessment/Plan      1. Previous Stage Ib ([id number]) right breast cancer:  · Diagnosed May 2010 with excisional biopsy for invasive ductal carcinoma, 1.3 cm, grade 2, ER 90%, IL 80%, HER-2/peter negative (1+ IHC).    · Subsequent right mastectomy in July 2010 with no residual breast malignancy, 1/5 sentinel lymph node with micrometastasis (0.25 mm).    · Treated in the [facility] system with adjuvant AC ×4 cycles in 2010 (no taxanes administered due to underlying Charcot-Saloni-Tooth with peripheral neuropathy).    · Adjuvant Femara (postmenopausal) initiated October 2010 with plan to treat ×10 years.    · Genetic testing reportedly negative.    · Developed osteopenia treated with Prolia  beginning 2/27/13. Subsequently discontinued due to identification of metastatic disease.  2. Recurrent/metastatic disease identified 10/8/17:  · Disease involving thoracic spine with cord compression at T6, lumbosacral involvement, sternal and right sternoclavicular involvement.    · Femara discontinued in 10/2017.    · Radiation administered (in the [facility] system) to the thoracic spine beginning 10/19/17 treating T3-T9 to a dose of 24 gray in 6 fractions.  · Evaluation with MRI 12/8/17 showing persistent T6 cord compression with persistent neurologic compromise requiring surgical treatment 12/11/17 with T6 laminectomy/corpectomy and T3-T9 fusion.  Pathology with metastatic carcinoma of breast origin, ER negative, CO negative, HER-2/peter negative (1+ IHC).  · Additional staging evaluation 12/8/17 with no evidence of visceral metastatic disease, bone scan showing involvement of thoracic spine, sternum, left humerus, mid frontal bone.  No plane film correlate of left humerus lesion.  MRI lumbar spine with small intradural L3 metastasis.  CA 15-3 12/6/17- 17.  · Palliative radiation therapy to L3 dural metastasis and left humerus initiated 1/15/18 (10 fractions), completed 1/26/18.  · Hypercalcemia of malignancy with calcium in the 10-11 range.  · Initiation of monthly Xgeva 1/23/18.  · 2/6/2018 review of baseline CT scan and bone scan to review from 1/30/18 with plans to potentially initiate single agent palliative oral Xeloda chemotherapy.  The patient has shown some continued improvement in subacute rehabilitation although her progress is extremely slow and she remains very limited given her neurologic compromise in her lower extremities.  Her CT scan shows no evidence of visceral involvement.  There was a cluster of nodular opacities in the right lower lobe suspected to be infectious or related to bronchiolitis.  The patient is asymptomatic from a pulmonary standpoint.  There is also thickening in the left renal  pelvis and left ureter raising the question of possible UTI/pyelonephritis and this will be discussed further below.  Bone scan 1/30/18 showed postsurgical change in the thoracic spine, stable uptake in the frontal bone, no new areas of disease.  At this point, I have recommended that we go ahead and pursue initiation of chemotherapy with single agent oral Xeloda as we have discussed before. The nursing facility to ensure proper administration of the medication.  · She returns today, 2/13/2018, for review after initiation of Xeloda single agent 1 week ago.  She is tolerating drug well.  She does receive this at the nursing facility and they're giving it within 30 minutes of meals, if for some reason there running late passing the medication, and the patient is notifying them.  She denies diarrhea or hand-foot syndrome.  She is using lotion to her hands and feet twice daily.  She does have increased gas though this is not painful.   3. Right pulmonary embolism:  · Diagnosed on CT angiogram 10/21/17 involving small right lower lobe pulmonary artery.  Lower extremity Dopplers negative.  · Bilateral lower extremity Dopplers negative again 12/5/17.  · Continuing on Lovenox 1 mg/kg (100 mg) subcutaneous injection every 12 hours.  4. Cancer related pain:  · Previously receiving Duragesic 50 µg patch every 72 hours along with Dilaudid 4 mg as needed for breakthrough pain  · The patient's pain improved over time and she was able to discontinue both Duragesic and Dilaudid in the interval.  5. Constipation:  · Currently receiving lactulose 20 g twice a day, MiraLAX daily, Senokot 2 tablets twice daily.  Constipation is currently mild.  6. Thrush:  · Currently resolved.    7. Traumatic left tibia/fibular fracture:  · Status post ORIF 12/6/17  · Specimen was sent for pathologic review, negative for evidence of malignancy  · Wound is healing well per orthopedics, boot remains in place.  8. Hypercalcemia:  · Suspect hypercalcemia  of malignancy, calcium in the 10-11 range previously.  · Current calcium improved to 8.3 following initiation of monthly Xgeva on 1/23/18.  Will repeat next week.  9. Antiemetics:  · The patient has Zofran ordered at the rehabilitation facility to use as needed.  10. Possible UTI:  · CT 1/30/18 with thickening of the left renal pelvis and left ureter to the level of the bladder with circumferential thickening.  The patient does note some mild dysuria currently.  She does have history of frequent urinary tract infections.  She does note having been on an antibiotic recently at the nursing facility.  We will check a urinalysis and urine culture today.    Plan:  1. Continue oral palliative single agent Xeloda at the nursing facility,  2000 mg in the morning, 1500 mg in the evening for 14/21 days.  2. In 1 week nurse practitioner visit with CBC, CMP, magnesium, phosphorus and monthly Xgeva  3. In 2 weeks M.D. visit CBC, CMP and the patient will be due to begin cycle 2 oral Xeloda.

## 2018-02-14 ENCOUNTER — PATIENT OUTREACH (OUTPATIENT)
Dept: CASE MANAGEMENT | Facility: OTHER | Age: 58
End: 2018-02-14

## 2018-02-14 NOTE — OUTREACH NOTE
Skilled Nursing Facility Discharge Flowsheet:     Skilled Nursing Facility Discharge Assessment 2/14/2018   Acute Facility Discharged From [facility]   Acute Discharge Date 12/16/2017   Name of the Skilled Nursing Facility? [facility]   Tier Level of the Skilled Nursing Facility 1   Purpose of SNF Admission PT;OT;SN;WC   Estimated length of stay for the patient? TBD   Who is the insurance provider or payor of patient stay? Medicare   Progression of Patient? Continues treatment for cancer-Oncologist appt. 2/13/18

## 2018-02-20 ENCOUNTER — TELEPHONE (OUTPATIENT)
Dept: ORTHOPEDIC SURGERY | Facility: CLINIC | Age: 58
End: 2018-02-20

## 2018-02-20 ENCOUNTER — LAB (OUTPATIENT)
Dept: LAB | Facility: HOSPITAL | Age: 58
End: 2018-02-20

## 2018-02-20 ENCOUNTER — OFFICE VISIT (OUTPATIENT)
Dept: ONCOLOGY | Facility: CLINIC | Age: 58
End: 2018-02-20

## 2018-02-20 ENCOUNTER — INFUSION (OUTPATIENT)
Dept: ONCOLOGY | Facility: HOSPITAL | Age: 58
End: 2018-02-20

## 2018-02-20 VITALS
DIASTOLIC BLOOD PRESSURE: 82 MMHG | BODY MASS INDEX: 33.46 KG/M2 | OXYGEN SATURATION: 98 % | WEIGHT: 183 LBS | HEART RATE: 99 BPM | SYSTOLIC BLOOD PRESSURE: 112 MMHG | TEMPERATURE: 97.8 F | RESPIRATION RATE: 14 BRPM

## 2018-02-20 DIAGNOSIS — Z17.1 MALIGNANT NEOPLASM OF OVERLAPPING SITES OF RIGHT BREAST IN FEMALE, ESTROGEN RECEPTOR NEGATIVE (HCC): Primary | ICD-10-CM

## 2018-02-20 DIAGNOSIS — C50.811 MALIGNANT NEOPLASM OF OVERLAPPING SITES OF RIGHT BREAST IN FEMALE, ESTROGEN RECEPTOR NEGATIVE (HCC): Primary | ICD-10-CM

## 2018-02-20 DIAGNOSIS — C50.811 MALIGNANT NEOPLASM OF OVERLAPPING SITES OF RIGHT BREAST IN FEMALE, ESTROGEN RECEPTOR NEGATIVE (HCC): ICD-10-CM

## 2018-02-20 DIAGNOSIS — Z86.711 HISTORY OF PULMONARY EMBOLISM: ICD-10-CM

## 2018-02-20 DIAGNOSIS — C79.51 BONE METASTASES: Primary | ICD-10-CM

## 2018-02-20 DIAGNOSIS — Z17.1 MALIGNANT NEOPLASM OF OVERLAPPING SITES OF RIGHT BREAST IN FEMALE, ESTROGEN RECEPTOR NEGATIVE (HCC): ICD-10-CM

## 2018-02-20 LAB
ALBUMIN SERPL-MCNC: 3.3 G/DL (ref 3.5–5.2)
ALBUMIN/GLOB SERPL: 1.2 G/DL (ref 1.1–2.4)
ALP SERPL-CCNC: 75 U/L (ref 38–116)
ALT SERPL W P-5'-P-CCNC: 17 U/L (ref 0–33)
ANION GAP SERPL CALCULATED.3IONS-SCNC: 9 MMOL/L
AST SERPL-CCNC: 25 U/L (ref 0–32)
BASOPHILS # BLD AUTO: 0.04 10*3/MM3 (ref 0–0.1)
BASOPHILS NFR BLD AUTO: 0.9 % (ref 0–1.1)
BILIRUB SERPL-MCNC: 0.3 MG/DL (ref 0.1–1.2)
BUN BLD-MCNC: 8 MG/DL (ref 6–20)
BUN/CREAT SERPL: 14 (ref 7.3–30)
CALCIUM SPEC-SCNC: 8.9 MG/DL (ref 8.5–10.2)
CHLORIDE SERPL-SCNC: 103 MMOL/L (ref 98–107)
CO2 SERPL-SCNC: 29 MMOL/L (ref 22–29)
CREAT BLD-MCNC: 0.57 MG/DL (ref 0.6–1.1)
DEPRECATED RDW RBC AUTO: 61.1 FL (ref 37–49)
EOSINOPHIL # BLD AUTO: 0.3 10*3/MM3 (ref 0–0.36)
EOSINOPHIL NFR BLD AUTO: 6.7 % (ref 1–5)
ERYTHROCYTE [DISTWIDTH] IN BLOOD BY AUTOMATED COUNT: 17.8 % (ref 11.7–14.5)
GFR SERPL CREATININE-BSD FRML MDRD: 109 ML/MIN/1.73
GLOBULIN UR ELPH-MCNC: 2.7 GM/DL (ref 1.8–3.5)
GLUCOSE BLD-MCNC: 127 MG/DL (ref 74–124)
HCT VFR BLD AUTO: 35.2 % (ref 34–45)
HGB BLD-MCNC: 11.2 G/DL (ref 11.5–14.9)
IMM GRANULOCYTES # BLD: 0.01 10*3/MM3 (ref 0–0.03)
IMM GRANULOCYTES NFR BLD: 0.2 % (ref 0–0.5)
LYMPHOCYTES # BLD AUTO: 0.83 10*3/MM3 (ref 1–3.5)
LYMPHOCYTES NFR BLD AUTO: 18.6 % (ref 20–49)
MAGNESIUM SERPL-MCNC: 1.7 MG/DL (ref 1.8–2.5)
MCH RBC QN AUTO: 31.5 PG (ref 27–33)
MCHC RBC AUTO-ENTMCNC: 31.8 G/DL (ref 32–35)
MCV RBC AUTO: 99.2 FL (ref 83–97)
MONOCYTES # BLD AUTO: 0.5 10*3/MM3 (ref 0.25–0.8)
MONOCYTES NFR BLD AUTO: 11.2 % (ref 4–12)
NEUTROPHILS # BLD AUTO: 2.78 10*3/MM3 (ref 1.5–7)
NEUTROPHILS NFR BLD AUTO: 62.4 % (ref 39–75)
NRBC BLD MANUAL-RTO: 0 /100 WBC (ref 0–0)
PHOSPHATE SERPL-MCNC: 3.2 MG/DL (ref 2.5–4.5)
PLATELET # BLD AUTO: 360 10*3/MM3 (ref 150–375)
PMV BLD AUTO: 9.2 FL (ref 8.9–12.1)
POTASSIUM BLD-SCNC: 4.5 MMOL/L (ref 3.5–4.7)
PROT SERPL-MCNC: 6 G/DL (ref 6.3–8)
RBC # BLD AUTO: 3.55 10*6/MM3 (ref 3.9–5)
SODIUM BLD-SCNC: 141 MMOL/L (ref 134–145)
WBC NRBC COR # BLD: 4.46 10*3/MM3 (ref 4–10)

## 2018-02-20 PROCEDURE — 83735 ASSAY OF MAGNESIUM: CPT | Performed by: INTERNAL MEDICINE

## 2018-02-20 PROCEDURE — 80053 COMPREHEN METABOLIC PANEL: CPT | Performed by: INTERNAL MEDICINE

## 2018-02-20 PROCEDURE — 96372 THER/PROPH/DIAG INJ SC/IM: CPT | Performed by: NURSE PRACTITIONER

## 2018-02-20 PROCEDURE — 84100 ASSAY OF PHOSPHORUS: CPT | Performed by: INTERNAL MEDICINE

## 2018-02-20 PROCEDURE — 99214 OFFICE O/P EST MOD 30 MIN: CPT | Performed by: NURSE PRACTITIONER

## 2018-02-20 PROCEDURE — 36415 COLL VENOUS BLD VENIPUNCTURE: CPT | Performed by: INTERNAL MEDICINE

## 2018-02-20 PROCEDURE — 25010000002 DENOSUMAB 120 MG/1.7ML SOLUTION: Performed by: INTERNAL MEDICINE

## 2018-02-20 PROCEDURE — 85025 COMPLETE CBC W/AUTO DIFF WBC: CPT | Performed by: INTERNAL MEDICINE

## 2018-02-20 RX ADMIN — DENOSUMAB 120 MG: 120 INJECTION SUBCUTANEOUS at 13:43

## 2018-02-20 NOTE — PROGRESS NOTES
Subjective .     REASONS FOR FOLLOWUP:    1. Stage Ib ([id number]) right breast cancer: Diagnosed May 2010 with excisional biopsy for invasive ductal carcinoma, 1.3 cm, grade 2, ER 90%, CT 80%, HER-2/peter negative (1+ IHC).  Subsequent right mastectomy in July 2010 with no residual breast malignancy, 1/5 sentinel lymph node with micrometastasis (0.25 mm).  Treated in the [facility] with adjuvant AC ×4 cycles in 2010 (no taxanes administered due to underlying Charcot-Saloni-Tooth with peripheral neuropathy).  Adjuvant Femara (postmenopausal) initiated October 2010 with plan to treat ×10 years.  Genetic testing reportedly negative.  Developed osteopenia treated with Prolia beginning 2/27/13.  2. Recurrent/metastatic disease identified 10/8/17 involving thoracic spine with cord compression at T6, lumbosacral involvement, sternal and right sternoclavicular involvement.  Femara discontinued.  Radiation administered (in the [facility] system) to the thoracic spine beginning 10/19/17 treating T3-T9 to a dose of 24 gray in 6 fractions.  3. Evaluation with MRI 12/8/17 showing persistent T6 cord compression with persistent neurologic compromise requiring surgical treatment 12/11/17 with T6 laminectomy/corpectomy and T3-T9 fusion.  Pathology with metastatic carcinoma of breast origin, ER negative, CT negative, HER-2/peter negative (1+ IHC).  4. Right pulmonary embolism 10/21/17 continuing on Lovenox 1 mg/kg (100 mg) every 12 hours.  No evidence of DVT.  5. Cancer related pain previously on Duragesic 50 µg patch every 72 hours and Dilaudid 4 mg as needed for breakthrough pain.  Narcotics subsequently discontinued with improvement in pain in January 2018.  6. Radiation therapy to L3 dural metastasis and left humerus initiated 1/15/18 (10 fractions), completed 1/26/18  7. Monthly Xgeva initiated 1/23/18  8. Mild hypercalcemia of malignancy  9. Initiation of palliative oral single agent Xeloda 2/7/18 (2000 mg a.m., 1500 mg p.m. for  14/21 days).    HISTORY OF PRESENT ILLNESS:  The patient is a 57 y.o. year old female who is here for follow-up with the above-mentioned history.    History of Present Illness  the patient returns today in follow-up and continues to reside at [facility] for subacute rehabilitation, though is being discharged tomorrow.  She continues on the Xeloda, 4 tabs every morning and 3 tabs every evening within 30 minutes of a meal.  She states there was an error in dispensing the medication and she was short pills.  Multiple time she confirmed that she has not taken more than the prescribed amount.  They did contact the pharmacy was overnighted her medication.  She was short 1 tablet yesterday.  Today is her last day of cycle.  She is to take this when she gets back to the rehabilitation facility following lunch and then move her evening dose approximately 12 hours from the previous.  As stated before, since this is her last day of the cycle this late dosing will not affect further dosing.    She denies fevers.  She denies diarrhea, urinary symptoms or abdominal pain.  She does have constipation and is using stool softeners.  She states her appetite is fair.  She does not have signs of hand-foot syndrome.  Her  is helping her apply lotion twice daily.  She continues Lovenox twice daily and is questioning if she can contact should continue this or switch medications upon discharge from the rehabilitation facility.  She is due for Xgeva today.    Past Medical History:   Diagnosis Date   • Acute pulmonary embolism, cancer-related 10/2017   • Allergic rhinitis    • Arthritis     Right knee; left shoulder   • Cancer 05/2010    Right breast   • Cancer 10/2017    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   • Charcot-Saloni-Tooth disease    • CPAP (continuous positive airway pressure) dependence    • GERD (gastroesophageal reflux disease)    • H/O Colon polyps    • H/O Uterine fibroid    • Heart murmur    • Hyperlipidemia    •  Hypertension    • PONV (postoperative nausea and vomiting)      Past Surgical History:   Procedure Laterality Date   • BLADDER SURGERY      Bladder lift   • BREAST RECONSTRUCTION, BREAST TISSUE EXPANDER INSERTION Right 2010   • BREAST SURGERY Right 2010    Mastectomy   •  SECTION  ,    • CHOLECYSTECTOMY     • COLONOSCOPY     • HERNIA REPAIR  2015    [facility]   • HYSTERECTOMY      Partial   • KNEE SURGERY Right    • LEG SURGERY Left     Broken   • MASTECTOMY Right 2010   • SD TREAT TIBIAL SHAFT FX, INTRAMED IMPLANT Left 2017    Procedure: TIBIA INTRAMEDULLARY NAIL/DON INSERTION;  Surgeon: Romero Shanks MD;  Location: [facility];  Service: Orthopedics   • THORACIC DECOMPRESSION POSTERIOR FUSION WITH INSTRUMENTATION N/A 2017    Procedure: T6 costotransversectomy and decompression with T3 to  T9 posterior spinal [facility];  Surgeon: Quan Nayak MD;  Location: [facility];  Service:        ONCOLOGIC HISTORY:  (History from previous dates can be found in the separate document.)    Current Outpatient Prescriptions on File Prior to Visit   Medication Sig Dispense Refill   • acetaminophen (TYLENOL) 500 MG tablet Take 500 mg by mouth Every 6 (Six) Hours As Needed for Mild Pain .     • atorvastatin (LIPITOR) 10 MG tablet Take 1 tablet by mouth Daily. 90 tablet 3   • capecitabine (XELODA) 500 MG chemo tablet Take 4 tabs (2000 mg) in the AM then take 3 tabs (1500 mg) in the PM for 14 days on then 7 days off. 98 tablet 3   • cyclobenzaprine (FLEXERIL) 10 MG tablet Take 1 tablet by mouth 3 (Three) Times a Day As Needed for Muscle Spasms. 30 tablet 3   • DULoxetine (CYMBALTA) 20 MG capsule Take 20 mg by mouth Daily.     • enoxaparin (LOVENOX) 100 MG/ML solution syringe Inject 1 mg/kg under the skin Every 12 (Twelve) Hours.     • FLONASE ALLERGY RELIEF 50 MCG/ACT nasal spray 2 sprays into each nostril daily.  11   •  gabapentin (NEURONTIN) 300 MG capsule Take 1 capsule by mouth 3 (Three) Times a Day. 90 capsule 2   • glycerin (ADULT) 2 g suppository rectal suppository Insert 2 g into the rectum.     • HYDROmorphone (DILAUDID) 4 MG tablet Take 1 tablet by mouth Every 4 (Four) Hours As Needed for Moderate Pain . 20 tablet 0   • lactulose (CHRONULAC) 10 GM/15ML solution Take 30 mL by mouth 2 (Two) Times a Day. 1892 mL 2   • LORazepam (ATIVAN) 1 MG tablet Take 1 mg by mouth Every 8 (Eight) Hours As Needed for Anxiety.     • mesalamine (LIALDA) 1.2 G EC tablet Take 1,200 mg by mouth 2 (two) times a day.     • nystatin (MYCOSTATIN) 788341 UNIT/GM ointment Apply  topically 2 (Two) Times a Day.     • nystatin (nystatin) 245100 UNIT/GM powder Apply  topically 4 (Four) Times a Day.     • omeprazole (PriLOSEC) 40 MG capsule TAKE 1 CAPSULE DAILY 90 capsule 2   • ondansetron ODT (ZOFRAN-ODT) 4 MG disintegrating tablet DIS 1 T ON THE TONGUE Q 8 H PRF NAUSEA  0   • polyethylene glycol (MIRALAX) packet Take 17 g by mouth Daily.     • psyllium (METAMUCIL) 58.6 % packet Take 1 packet by mouth Daily.     • saccharomyces boulardii (FLORASTOR) 250 MG capsule Take 250 mg by mouth 2 (Two) Times a Day.     • sennosides-docusate sodium (SENOKOT-S) 8.6-50 MG tablet Take 2 tablets by mouth 2 (Two) Times a Day. 90 tablet 2   • traMADol (ULTRAM) 50 MG tablet Take 1 tablet by mouth Every 8 (Eight) Hours As Needed for Moderate Pain . 30 tablet 0   • Tuberculin PPD (APLISOL ID) Inject  into the skin.       No current facility-administered medications on file prior to visit.        ALLERGIES:     Allergies   Allergen Reactions   • Hydrocodone Nausea Only   • Morphine And Related Nausea And Vomiting     Social History     Social History   • Marital status:      Spouse name: Murray   • Number of children: 3   • Years of education: College     Occupational History   •  Retired     Social History Main Topics   • Smoking status: Never Smoker   • Smokeless  tobacco: Never Used   • Alcohol use Yes      Comment: social   • Drug use: No   • Sexual activity: Defer     Other Topics Concern   • Not on file     Social History Narrative     Family History   Problem Relation Age of Onset   • Heart disease Mother    • Hyperlipidemia Mother    • Hypertension Mother    • Diabetes Father    • Charcot-Saloni-Tooth disease Father    • Heart disease Father    • Hypertension Father    • Heart failure Father    • Diabetes Sister    • Heart disease Sister    • Hypertension Sister    • Heart disease Maternal Aunt    • Scoliosis Maternal Aunt    • Heart disease Maternal Uncle    • Diabetes Maternal Grandmother    • Heart disease Maternal Grandmother    • Hypertension Maternal Grandmother    • Uterine cancer Maternal Grandmother    • Heart disease Maternal Grandfather      Review of Systems   Constitutional: Positive for activity change (Continues to use wheelchair for mobility) and fatigue. Negative for appetite change, fever and unexpected weight change.   HENT: Negative for congestion, mouth sores, nosebleeds, sore throat and voice change.    Respiratory: Negative for cough, shortness of breath and wheezing.    Cardiovascular: Negative for chest pain, palpitations and leg swelling.   Gastrointestinal: Positive for constipation. Negative for abdominal distention, abdominal pain, blood in stool, nausea and vomiting.   Endocrine: Negative for cold intolerance and heat intolerance.   Genitourinary: Negative for difficulty urinating, dysuria, frequency and hematuria.   Musculoskeletal: Positive for back pain. Negative for arthralgias, joint swelling and myalgias.   Skin: Negative for color change and rash.   Neurological: Negative for dizziness, syncope, weakness, light-headedness, numbness and headaches.   Hematological: Negative for adenopathy. Does not bruise/bleed easily.   Psychiatric/Behavioral: Negative for confusion and sleep disturbance. The patient is not nervous/anxious.           Objective      Vitals:    02/20/18 1257   BP: 112/82   Pulse: 99   Resp: 14   Temp: 97.8 °F (36.6 °C)   SpO2: 98%      Current Status 2/20/2018   ECOG score 3   Pain 3/10    Physical Exam   Constitutional: She is oriented to person, place, and time.   The patient is currently seated in a wheelchair with back brace in place.   HENT:   Mouth/Throat: Oropharynx is clear and moist and mucous membranes are normal.   Eyes: Conjunctivae and EOM are normal. Pupils are equal, round, and reactive to light.   Cardiovascular: Normal rate and regular rhythm.    Pulmonary/Chest: Breath sounds normal. No respiratory distress.   Abdominal: Soft. Bowel sounds are normal.   Musculoskeletal: She exhibits no edema.   Boot in place left lower extremity   Neurological: She is alert and oriented to person, place, and time.   Skin: Skin is warm and dry. No rash noted.   Psychiatric: She has a normal mood and affect. Her behavior is normal.       RECENT LABS:  Hematology WBC   Date Value Ref Range Status   02/20/2018 4.46 4.00 - 10.00 10*3/mm3 Final     RBC   Date Value Ref Range Status   02/20/2018 3.55 (L) 3.90 - 5.00 10*6/mm3 Final     Hemoglobin   Date Value Ref Range Status   02/20/2018 11.2 (L) 11.5 - 14.9 g/dL Final     Hematocrit   Date Value Ref Range Status   02/20/2018 35.2 34.0 - 45.0 % Final     Platelets   Date Value Ref Range Status   02/20/2018 360 150 - 375 10*3/mm3 Final        Lab Results   Component Value Date    GLUCOSE 127 (H) 02/20/2018    BUN 8 02/20/2018    CREATININE 0.57 (L) 02/20/2018    EGFRIFNONA 109 02/20/2018    EGFRIFAFRI 80 09/25/2017    BCR 14.0 02/20/2018    K 4.5 02/20/2018    CO2 29.0 02/20/2018    CALCIUM 8.9 02/20/2018    PROTENTOTREF 6.4 09/25/2017    ALBUMIN 3.30 (L) 02/20/2018    LABIL2 1.2 02/20/2018    AST 25 02/20/2018    ALT 17 02/20/2018         Assessment/Plan      1. Previous Stage Ib ([id number]) right breast cancer:  · Diagnosed May 2010 with excisional biopsy for invasive ductal  carcinoma, 1.3 cm, grade 2, ER 90%, KY 80%, HER-2/peter negative (1+ IHC).    · Subsequent right mastectomy in July 2010 with no residual breast malignancy, 1/5 sentinel lymph node with micrometastasis (0.25 mm).    · Treated in the [facility] system with adjuvant AC ×4 cycles in 2010 (no taxanes administered due to underlying Charcot-Saloni-Tooth with peripheral neuropathy).    · Adjuvant Femara (postmenopausal) initiated October 2010 with plan to treat ×10 years.    · Genetic testing reportedly negative.    · Developed osteopenia treated with Prolia beginning 2/27/13. Subsequently discontinued due to identification of metastatic disease.  2. Recurrent/metastatic disease identified 10/8/17:  · Disease involving thoracic spine with cord compression at T6, lumbosacral involvement, sternal and right sternoclavicular involvement.    · Femara discontinued in 10/2017.    · Radiation administered (in the [facility] system) to the thoracic spine beginning 10/19/17 treating T3-T9 to a dose of 24 gray in 6 fractions.  · Evaluation with MRI 12/8/17 showing persistent T6 cord compression with persistent neurologic compromise requiring surgical treatment 12/11/17 with T6 laminectomy/corpectomy and T3-T9 fusion.  Pathology with metastatic carcinoma of breast origin, ER negative, KY negative, HER-2/peter negative (1+ IHC).  · Additional staging evaluation 12/8/17 with no evidence of visceral metastatic disease, bone scan showing involvement of thoracic spine, sternum, left humerus, mid frontal bone.  No plane film correlate of left humerus lesion.  MRI lumbar spine with small intradural L3 metastasis.  CA 15-3 12/6/17- 17.  · Palliative radiation therapy to L3 dural metastasis and left humerus initiated 1/15/18 (10 fractions), completed 1/26/18.  · Hypercalcemia of malignancy with calcium in the 10-11 range.  · Initiation of monthly Xgeva 1/23/18.  Due for Xgeva today.  3. 2/6/2018 review of baseline CT scan and bone scan to review from  1/30/18 with plans to potentially initiate single agent palliative oral Xeloda chemotherapy.  The patient has shown some continued improvement in subacute rehabilitation although her progress is extremely slow and she remains very limited given her neurologic compromise in her lower extremities.  Her CT scan shows no evidence of visceral involvement.  There was a cluster of nodular opacities in the right lower lobe suspected to be infectious or related to bronchiolitis.  The patient is asymptomatic from a pulmonary standpoint.  There is also thickening in the left renal pelvis and left ureter raising the question of possible UTI/pyelonephritis and this will be discussed further below.  Bone scan 1/30/18 showed postsurgical change in the thoracic spine, stable uptake in the frontal bone, no new areas of disease.    · Initiation of single agent Xeloda on 2/7/2018  · Patient returns today, 2/20/2018, for follow-up on Xeloda.  She is tolerating the drug well with no diarrhea, nausea, change in appetite, or hand-foot syndrome currently.  There was a pharmacy air in dispensing and they are overtightening her more pills to finish out the cycle today.  She is being discharged from rehabilitation tomorrow morning.  She follows up in 1 week with Dr. Hancock.  4. Right pulmonary embolism:  · Diagnosed on CT angiogram 10/21/17 involving small right lower lobe pulmonary artery.  Lower extremity Dopplers negative.  · Bilateral lower extremity Dopplers negative again 12/5/17.  · Continuing on Lovenox 1 mg/kg (100 mg) subcutaneous injection every 12 hours.  5. Cancer related pain:  · Previously receiving Duragesic 50 µg patch every 72 hours along with Dilaudid 4 mg as needed for breakthrough pain  · The patient's pain improved over time and she was able to discontinue both Duragesic and Dilaudid in the interval.  6. Constipation:  · Total with stool softeners per patient report..  7. Traumatic left tibia/fibular fracture:  · Status  post ORIF 12/6/17  · Specimen was sent for pathologic review, negative for evidence of malignancy  · Boot remains in place.  8. Hypercalcemia:  · Suspect hypercalcemia of malignancy, calcium in the 10-11 range previously.  · Current calcium improved to 8.3 following initiation of monthly Xgeva on 1/23/18.  Due for Xgeva today.  9. Antiemetics:  · The patient has Zofran ordered at the rehabilitation facility to use as needed.    Plan:  1. Continue oral palliative single agent Xeloda at the nursing facility,  2000 mg in the morning, 1500 mg in the evening for 14/21 days.  Today is the last day of her cycle.  2. In 1 week M.D. visit CBC, CMP and the patient will be due to begin cycle 2 oral Xeloda.  3. Proceed with Xgeva today.  4. Continue Lovenox 100 mg every 12 hours.                A conversation was held with the patient today regarding CHEMO/BIOTHERAPY, specifically Xeloda.   We reviewed the proper handling of the medication, as well as proper storage.  The patient also knows to dispose of the medication through the pharmacy or the office.  The treatment schedule was reviewed, including any scheduled breaks in therapy.  The patient verbalized understanding of how to take the medication, with or without food, and any food-drug interactions of which they should be aware.  The patient’s status was reviewed, including any possible side effects s/he may be experiencing.  Lab and appointment schedule were reviewed.  Patient concerns and questions were addressed and answered.  Approximately 5 minutes were spent in education and counseling on oral medication adherence via face to face counseling.

## 2018-02-20 NOTE — PROGRESS NOTES
Reviewed calcium level with pt.  Pt stated that she is taking her Calcium w/ D supp twice daily.  She also takes TUMS    Per Nani NP/Dr Hancock  pt advised to continue on Lovenox and if she has trouble with rehab refilling it to call our office.  Pt v/u

## 2018-02-22 ENCOUNTER — PATIENT OUTREACH (OUTPATIENT)
Dept: CASE MANAGEMENT | Facility: OTHER | Age: 58
End: 2018-02-22

## 2018-02-22 NOTE — OUTREACH NOTE
Skilled Nursing Facility Discharge Flowsheet:     Skilled Nursing Facility Discharge Assessment 2/22/2018   Acute Facility Discharged From [facility]   Acute Discharge Date 12/16/2017   Name of the Skilled Nursing Facility? [facility] ([place], [place])   Tier Level of the Skilled Nursing Facility -   Purpose of SNF Admission PT;OT;SN;WC   Estimated length of stay for the patient? -   Who is the insurance provider or payor of patient stay? Medicare   Progression of Patient? -   Skilled Nursing Discharge Date? 2/21/2018   Where was the patient discharged to? Home   Home Health Agency at discharge? Yes   Name of Home Health Agency? [facility]   DME Needed at Discharge? No   Are there any medication concerns at Discharge No   Does the patient have a follow-up appointment? Yes   Comments Regarding F/U Appt. ? PCP, Dr. Chanel, 2/26/18

## 2018-02-23 ENCOUNTER — OFFICE VISIT (OUTPATIENT)
Dept: ORTHOPEDIC SURGERY | Facility: CLINIC | Age: 58
End: 2018-02-23

## 2018-02-23 ENCOUNTER — EPISODE CHANGES (OUTPATIENT)
Dept: CASE MANAGEMENT | Facility: OTHER | Age: 58
End: 2018-02-23

## 2018-02-23 ENCOUNTER — PATIENT OUTREACH (OUTPATIENT)
Dept: CASE MANAGEMENT | Facility: OTHER | Age: 58
End: 2018-02-23

## 2018-02-23 DIAGNOSIS — M43.24 FUSION OF SPINE OF THORACIC REGION: Primary | ICD-10-CM

## 2018-02-23 DIAGNOSIS — Z98.890 HISTORY OF OPEN REDUCTION AND INTERNAL FIXATION (ORIF) PROCEDURE: ICD-10-CM

## 2018-02-23 PROCEDURE — 73590 X-RAY EXAM OF LOWER LEG: CPT | Performed by: ORTHOPAEDIC SURGERY

## 2018-02-23 PROCEDURE — 72070 X-RAY EXAM THORAC SPINE 2VWS: CPT | Performed by: ORTHOPAEDIC SURGERY

## 2018-02-23 PROCEDURE — 99024 POSTOP FOLLOW-UP VISIT: CPT | Performed by: ORTHOPAEDIC SURGERY

## 2018-02-23 NOTE — OUTREACH NOTE
Discussed follow-up appointment today with Dr. Nayak and to continue to wear brace for 4 more weeks.  Medications reconciled with [facility] at start of care 2/22/18.  Adherent to regimen and has an additional medication to secure today from [facility].  [facility]. Review of [facility] on call services for after hour needs.  [place] contact information provided.

## 2018-02-26 ENCOUNTER — OFFICE VISIT (OUTPATIENT)
Dept: INTERNAL MEDICINE | Facility: CLINIC | Age: 58
End: 2018-02-26

## 2018-02-26 VITALS
BODY MASS INDEX: 33.65 KG/M2 | WEIGHT: 184 LBS | SYSTOLIC BLOOD PRESSURE: 126 MMHG | HEART RATE: 105 BPM | OXYGEN SATURATION: 99 % | DIASTOLIC BLOOD PRESSURE: 74 MMHG

## 2018-02-26 DIAGNOSIS — I10 ESSENTIAL HYPERTENSION: ICD-10-CM

## 2018-02-26 DIAGNOSIS — S22.058D OTHER CLOSED FRACTURE OF SIXTH THORACIC VERTEBRA WITH ROUTINE HEALING, SUBSEQUENT ENCOUNTER: ICD-10-CM

## 2018-02-26 DIAGNOSIS — D64.9 ANEMIA, UNSPECIFIED TYPE: ICD-10-CM

## 2018-02-26 DIAGNOSIS — F32.A DEPRESSION, UNSPECIFIED DEPRESSION TYPE: ICD-10-CM

## 2018-02-26 DIAGNOSIS — Z17.1 MALIGNANT NEOPLASM OF OVERLAPPING SITES OF RIGHT BREAST IN FEMALE, ESTROGEN RECEPTOR NEGATIVE (HCC): Primary | ICD-10-CM

## 2018-02-26 DIAGNOSIS — C50.811 MALIGNANT NEOPLASM OF OVERLAPPING SITES OF RIGHT BREAST IN FEMALE, ESTROGEN RECEPTOR NEGATIVE (HCC): Primary | ICD-10-CM

## 2018-02-26 DIAGNOSIS — E78.5 HYPERLIPIDEMIA, UNSPECIFIED HYPERLIPIDEMIA TYPE: ICD-10-CM

## 2018-02-26 DIAGNOSIS — G60.0 HEREDITARY SENSORIMOTOR NEUROPATHY: ICD-10-CM

## 2018-02-26 PROCEDURE — 99215 OFFICE O/P EST HI 40 MIN: CPT | Performed by: INTERNAL MEDICINE

## 2018-02-26 RX ORDER — CYCLOBENZAPRINE HCL 10 MG
10 TABLET ORAL 3 TIMES DAILY PRN
Qty: 30 TABLET | Refills: 3 | Status: SHIPPED | OUTPATIENT
Start: 2018-02-26 | End: 2019-03-20 | Stop reason: SDUPTHER

## 2018-02-26 RX ORDER — DULOXETIN HYDROCHLORIDE 30 MG/1
30 CAPSULE, DELAYED RELEASE ORAL 2 TIMES DAILY
Qty: 60 CAPSULE | Refills: 5 | Status: SHIPPED | OUTPATIENT
Start: 2018-02-26 | End: 2018-02-26 | Stop reason: SDUPTHER

## 2018-02-26 RX ORDER — DULOXETIN HYDROCHLORIDE 30 MG/1
30 CAPSULE, DELAYED RELEASE ORAL DAILY
Qty: 30 CAPSULE | Refills: 5 | Status: SHIPPED | OUTPATIENT
Start: 2018-02-26 | End: 2018-07-11

## 2018-02-26 RX ORDER — GABAPENTIN 300 MG/1
300 CAPSULE ORAL 3 TIMES DAILY
Qty: 90 CAPSULE | Refills: 2 | Status: SHIPPED | OUTPATIENT
Start: 2018-02-26 | End: 2018-07-31 | Stop reason: SDUPTHER

## 2018-02-26 NOTE — PROGRESS NOTES
Chief Complaint   Patient presents with   • Hyperlipidemia   • Hypertension     Htn, hyperlipidemia, charcot ambika tooth, metastatic breast cancer, pulmonary embolism      History of Present Illness   Martha Davey is a 57 y.o. female presents for follow up evaluation. Complicated course from last vist. She was diagnosed with spinal metastatic breast cancer w/ compression fractures. She had radiation therapy and thoracic lamenectomy. She had a fall in rehab w/ a tib/ fib fracture. She also had a pulmonary embolism in hospital. She had a prolonged course at rehab. She has just been discharged home. She is unable to ambulate and is wheelchair bound at this time related to her chronic cmt disease as well as recent spinal and leg fractures. She has some continued pain. She previously was taking dilauded but has not felt that she needs this and is concerned of past constipation. She has rx for gabapentin tid but is often taking bid. She has some situational depression. She has been off of low dose cymbalta for 5 days. She saw a psycho therapist while in rehab but does not currently have a therapist. She has a h/o hypertension but has had a 30 # weight loss and has not needed her antihypertensives. She is on lovenox for PE.     The following portions of the patient's history were reviewed and updated as appropriate: allergies, current medications, past family history, past medical history, past social history, past surgical history and problem list.  Current Outpatient Prescriptions on File Prior to Visit   Medication Sig Dispense Refill   • acetaminophen (TYLENOL) 500 MG tablet Take 500 mg by mouth Every 6 (Six) Hours As Needed for Mild Pain .     • capecitabine (XELODA) 500 MG chemo tablet Take 4 tabs (2000 mg) in the AM then take 3 tabs (1500 mg) in the PM for 14 days on then 7 days off. 98 tablet 3   • enoxaparin (LOVENOX) 100 MG/ML solution syringe Inject 1 mg/kg under the skin Every 12 (Twelve) Hours.     •  FLONASE ALLERGY RELIEF 50 MCG/ACT nasal spray 2 sprays into each nostril daily.  11   • glycerin (ADULT) 2 g suppository rectal suppository Insert 2 g into the rectum.     • HYDROmorphone (DILAUDID) 4 MG tablet Take 1 tablet by mouth Every 4 (Four) Hours As Needed for Moderate Pain . 20 tablet 0   • mesalamine (LIALDA) 1.2 G EC tablet Take 1,200 mg by mouth 2 (two) times a day.     • omeprazole (PriLOSEC) 40 MG capsule TAKE 1 CAPSULE DAILY 90 capsule 2   • ondansetron ODT (ZOFRAN-ODT) 4 MG disintegrating tablet DIS 1 T ON THE TONGUE Q 8 H PRF NAUSEA  0   • polyethylene glycol (MIRALAX) packet Take 17 g by mouth Daily.     • psyllium (METAMUCIL) 58.6 % packet Take 1 packet by mouth Daily.     • [DISCONTINUED] atorvastatin (LIPITOR) 10 MG tablet Take 1 tablet by mouth Daily. 90 tablet 3   • [DISCONTINUED] cyclobenzaprine (FLEXERIL) 10 MG tablet Take 1 tablet by mouth 3 (Three) Times a Day As Needed for Muscle Spasms. 30 tablet 3   • [DISCONTINUED] DULoxetine (CYMBALTA) 20 MG capsule Take 20 mg by mouth Daily.     • [DISCONTINUED] gabapentin (NEURONTIN) 300 MG capsule Take 1 capsule by mouth 3 (Three) Times a Day. 90 capsule 2   • lactulose (CHRONULAC) 10 GM/15ML solution Take 30 mL by mouth 2 (Two) Times a Day. 1892 mL 2   • LORazepam (ATIVAN) 1 MG tablet Take 1 mg by mouth Every 8 (Eight) Hours As Needed for Anxiety.     • nystatin (MYCOSTATIN) 697325 UNIT/GM ointment Apply  topically 2 (Two) Times a Day.     • nystatin (nystatin) 755160 UNIT/GM powder Apply  topically 4 (Four) Times a Day.     • saccharomyces boulardii (FLORASTOR) 250 MG capsule Take 250 mg by mouth 2 (Two) Times a Day.     • sennosides-docusate sodium (SENOKOT-S) 8.6-50 MG tablet Take 2 tablets by mouth 2 (Two) Times a Day. 90 tablet 2   • traMADol (ULTRAM) 50 MG tablet Take 1 tablet by mouth Every 8 (Eight) Hours As Needed for Moderate Pain . 30 tablet 0   • Tuberculin PPD (APLISOL ID) Inject  into the skin.       No current facility-administered  medications on file prior to visit.      Review of Systems   Constitutional: Positive for fatigue.   HENT: Negative.    Eyes: Negative.    Respiratory: Negative.    Cardiovascular: Positive for palpitations.   Gastrointestinal: Positive for constipation.   Endocrine: Negative.    Genitourinary: Positive for dysuria.   Musculoskeletal: Positive for arthralgias, back pain and gait problem.   Skin: Negative.    Allergic/Immunologic: Negative.    Neurological: Positive for numbness.   Hematological: Negative.    Psychiatric/Behavioral: Positive for sleep disturbance. Negative for suicidal ideas.        Depression       Objective   Physical Exam   Constitutional: She is oriented to person, place, and time.   Alert and pleasant/ appropriate. Tearful. Chronically ill and wheelchair bound.    HENT:   Head: Normocephalic and atraumatic.   Nose: Nose normal.   Mouth/Throat: Oropharynx is clear and moist.   Eyes: EOM are normal. Pupils are equal, round, and reactive to light.   Neck: Normal range of motion. Neck supple.   Cardiovascular: Regular rhythm and normal heart sounds.    tachy   Pulmonary/Chest: Effort normal and breath sounds normal.   Abdominal: Soft. Bowel sounds are normal.   Musculoskeletal:   Decreased strength le B   Neurological: She is alert and oriented to person, place, and time.   Skin: Skin is warm and dry.   Psychiatric: Her behavior is normal. Judgment and thought content normal.   Tearful.    Nursing note and vitals reviewed.       /74  Pulse 105  Wt 83.5 kg (184 lb)  LMP  (LMP Unknown)  SpO2 99%  BMI 33.65 kg/m2    Assessment/Plan   Diagnoses and all orders for this visit:    Malignant neoplasm of overlapping sites of right breast in female, estrogen receptor negative  -     Ambulatory Referral to Home Health  -     Ambulatory Referral to Physical Medicine Rehab    Hyperlipidemia, unspecified hyperlipidemia type    Essential hypertension    Hereditary sensorimotor neuropathy  -      Ambulatory Referral to Home Health  -     Ambulatory Referral to Physical Medicine Rehab    Other closed fracture of sixth thoracic vertebra with routine healing, subsequent encounter  -     Ambulatory Referral to Home Health  -     Ambulatory Referral to Physical Medicine Rehab    Anemia, unspecified type  -     Vitamin B12  -     Iron Profile    Depression, unspecified depression type    Other orders  -     gabapentin (NEURONTIN) 300 MG capsule; Take 1 capsule by mouth 3 (Three) Times a Day.  -     cyclobenzaprine (FLEXERIL) 10 MG tablet; Take 1 tablet by mouth 3 (Three) Times a Day As Needed for Muscle Spasms.  -     DULoxetine (CYMBALTA) 30 MG capsule; Take 1 capsule by mouth 2 (Two) Times a Day.      Patient w/ multiple issues/ complicated. She has metastatic breast cancer. She will continue to follow w/ hematology and spinal specialists. She will be referred to [facility] for further rehab. Also to have home [facility] for PT, OT, nursing services. Gave order for bedside commode w/ side rail detachment to enable transfer. She will have a u/a tested by home health given recent UTI. Will restart cymbalta at 30 mg and advance dose to 60 mg if tolerated. She is to see a behavioral oncologist through the cancer center. She will f/u w/ hematology in regards to treatment of PE. She will d/c lipitor for now as this may have some muscular detriment and is not of full concern at thist [facility]. Remain off bp meds. Monitor pulse. If this remains elevated may restart carvedilol at 1/2 tab daily. She has mild anemia and will add b12 and iron testing to blood in lab. She will take gabapentin tid for pain and will start flexeril hs if needed. She will take miralax qod to prevent constipation. She will f/u in office in 1 month or prn. Total visit today >45 min w/ counseling and review of notes/ labs/ medications.

## 2018-02-27 ENCOUNTER — LAB (OUTPATIENT)
Dept: LAB | Facility: HOSPITAL | Age: 58
End: 2018-02-27

## 2018-02-27 ENCOUNTER — OFFICE VISIT (OUTPATIENT)
Dept: ONCOLOGY | Facility: CLINIC | Age: 58
End: 2018-02-27

## 2018-02-27 VITALS
OXYGEN SATURATION: 99 % | DIASTOLIC BLOOD PRESSURE: 86 MMHG | TEMPERATURE: 98.4 F | RESPIRATION RATE: 12 BRPM | HEART RATE: 95 BPM | SYSTOLIC BLOOD PRESSURE: 138 MMHG

## 2018-02-27 DIAGNOSIS — R30.0 DYSURIA: ICD-10-CM

## 2018-02-27 DIAGNOSIS — C79.51 BONE METASTASES: ICD-10-CM

## 2018-02-27 DIAGNOSIS — C50.811 MALIGNANT NEOPLASM OF OVERLAPPING SITES OF RIGHT BREAST IN FEMALE, ESTROGEN RECEPTOR NEGATIVE (HCC): ICD-10-CM

## 2018-02-27 DIAGNOSIS — E83.52 HYPERCALCEMIA OF MALIGNANCY: ICD-10-CM

## 2018-02-27 DIAGNOSIS — G89.3 CANCER ASSOCIATED PAIN: ICD-10-CM

## 2018-02-27 DIAGNOSIS — C50.811 MALIGNANT NEOPLASM OF OVERLAPPING SITES OF RIGHT BREAST IN FEMALE, ESTROGEN RECEPTOR NEGATIVE (HCC): Primary | ICD-10-CM

## 2018-02-27 DIAGNOSIS — Z17.1 MALIGNANT NEOPLASM OF OVERLAPPING SITES OF RIGHT BREAST IN FEMALE, ESTROGEN RECEPTOR NEGATIVE (HCC): ICD-10-CM

## 2018-02-27 DIAGNOSIS — Z17.1 MALIGNANT NEOPLASM OF OVERLAPPING SITES OF RIGHT BREAST IN FEMALE, ESTROGEN RECEPTOR NEGATIVE (HCC): Primary | ICD-10-CM

## 2018-02-27 LAB
ALBUMIN SERPL-MCNC: 3.4 G/DL (ref 3.5–5.2)
ALBUMIN/GLOB SERPL: 1.2 G/DL (ref 1.1–2.4)
ALP SERPL-CCNC: 73 U/L (ref 38–116)
ALT SERPL W P-5'-P-CCNC: 16 U/L (ref 0–33)
ANION GAP SERPL CALCULATED.3IONS-SCNC: 10.1 MMOL/L
AST SERPL-CCNC: 20 U/L (ref 0–32)
BASOPHILS # BLD AUTO: 0.04 10*3/MM3 (ref 0–0.1)
BASOPHILS NFR BLD AUTO: 1.1 % (ref 0–1.1)
BILIRUB SERPL-MCNC: 0.2 MG/DL (ref 0.1–1.2)
BUN BLD-MCNC: 7 MG/DL (ref 6–20)
BUN/CREAT SERPL: 14.3 (ref 7.3–30)
CALCIUM SPEC-SCNC: 8.4 MG/DL (ref 8.5–10.2)
CHLORIDE SERPL-SCNC: 106 MMOL/L (ref 98–107)
CO2 SERPL-SCNC: 27.9 MMOL/L (ref 22–29)
CREAT BLD-MCNC: 0.49 MG/DL (ref 0.6–1.1)
DEPRECATED RDW RBC AUTO: 68.8 FL (ref 37–49)
EOSINOPHIL # BLD AUTO: 0.28 10*3/MM3 (ref 0–0.36)
EOSINOPHIL NFR BLD AUTO: 7.9 % (ref 1–5)
ERYTHROCYTE [DISTWIDTH] IN BLOOD BY AUTOMATED COUNT: 18.6 % (ref 11.7–14.5)
GFR SERPL CREATININE-BSD FRML MDRD: 130 ML/MIN/1.73
GLOBULIN UR ELPH-MCNC: 2.8 GM/DL (ref 1.8–3.5)
GLUCOSE BLD-MCNC: 116 MG/DL (ref 74–124)
HCT VFR BLD AUTO: 38.4 % (ref 34–45)
HGB BLD-MCNC: 12 G/DL (ref 11.5–14.9)
IMM GRANULOCYTES # BLD: 0.02 10*3/MM3 (ref 0–0.03)
IMM GRANULOCYTES NFR BLD: 0.6 % (ref 0–0.5)
LYMPHOCYTES # BLD AUTO: 0.7 10*3/MM3 (ref 1–3.5)
LYMPHOCYTES NFR BLD AUTO: 19.8 % (ref 20–49)
MCH RBC QN AUTO: 31.5 PG (ref 27–33)
MCHC RBC AUTO-ENTMCNC: 31.3 G/DL (ref 32–35)
MCV RBC AUTO: 100.8 FL (ref 83–97)
MONOCYTES # BLD AUTO: 0.37 10*3/MM3 (ref 0.25–0.8)
MONOCYTES NFR BLD AUTO: 10.5 % (ref 4–12)
NEUTROPHILS # BLD AUTO: 2.13 10*3/MM3 (ref 1.5–7)
NEUTROPHILS NFR BLD AUTO: 60.1 % (ref 39–75)
NRBC BLD MANUAL-RTO: 0 /100 WBC (ref 0–0)
PLATELET # BLD AUTO: 321 10*3/MM3 (ref 150–375)
PMV BLD AUTO: 9.7 FL (ref 8.9–12.1)
POTASSIUM BLD-SCNC: 4.2 MMOL/L (ref 3.5–4.7)
PROT SERPL-MCNC: 6.2 G/DL (ref 6.3–8)
RBC # BLD AUTO: 3.81 10*6/MM3 (ref 3.9–5)
SODIUM BLD-SCNC: 144 MMOL/L (ref 134–145)
WBC NRBC COR # BLD: 3.54 10*3/MM3 (ref 4–10)

## 2018-02-27 PROCEDURE — 80053 COMPREHEN METABOLIC PANEL: CPT | Performed by: INTERNAL MEDICINE

## 2018-02-27 PROCEDURE — 36415 COLL VENOUS BLD VENIPUNCTURE: CPT | Performed by: INTERNAL MEDICINE

## 2018-02-27 PROCEDURE — 99214 OFFICE O/P EST MOD 30 MIN: CPT | Performed by: INTERNAL MEDICINE

## 2018-02-27 PROCEDURE — 85025 COMPLETE CBC W/AUTO DIFF WBC: CPT | Performed by: INTERNAL MEDICINE

## 2018-02-28 NOTE — PROGRESS NOTES
Subjective .     REASONS FOR FOLLOWUP:    1. Stage Ib ([id number]) right breast cancer: Diagnosed May 2010 with excisional biopsy for invasive ductal carcinoma, 1.3 cm, grade 2, ER 90%, ME 80%, HER-2/peter negative (1+ IHC).  Subsequent right mastectomy in July 2010 with no residual breast malignancy, 1/5 sentinel lymph node with micrometastasis (0.25 mm).  Treated in the [facility] with adjuvant AC ×4 cycles in 2010 (no taxanes administered due to underlying Charcot-Saloni-Tooth with peripheral neuropathy).  Adjuvant Femara (postmenopausal) initiated October 2010 with plan to treat ×10 years.  Genetic testing reportedly negative.  Developed osteopenia treated with Prolia beginning 2/27/13.  2. Recurrent/metastatic disease identified 10/8/17 involving thoracic spine with cord compression at T6, lumbosacral involvement, sternal and right sternoclavicular involvement.  Femara discontinued.  Radiation administered (in the [facility] system) to the thoracic spine beginning 10/19/17 treating T3-T9 to a dose of 24 gray in 6 fractions.  3. Evaluation with MRI 12/8/17 showing persistent T6 cord compression with persistent neurologic compromise requiring surgical treatment 12/11/17 with T6 laminectomy/corpectomy and T3-T9 fusion.  Pathology with metastatic carcinoma of breast origin, ER negative, ME negative, HER-2/peter negative (1+ IHC).  4. Right pulmonary embolism 10/21/17 continuing on Lovenox 1 mg/kg (100 mg) every 12 hours.  No evidence of DVT.  5. Cancer related pain previously on Duragesic 50 µg patch every 72 hours and Dilaudid 4 mg as needed for breakthrough pain.  Narcotics subsequently discontinued with improvement in pain in January 2018.  6. Radiation therapy to L3 dural metastasis and left humerus initiated 1/15/18 (10 fractions), completed 1/26/18  7. Monthly Xgeva initiated 1/23/18  8. Mild hypercalcemia of malignancy  9. Initiation of palliative oral single agent Xeloda 2/7/18 (2000 mg a.m., 1500 mg p.m. for  14/21 days).    HISTORY OF PRESENT ILLNESS:  The patient is a 57 y.o. year old female who is here for follow-up with the above-mentioned history.    History of Present Illness   returns today in follow-up due to begin her second cycle of palliative Xeloda chemotherapy and continuing on monthly Xgeva last received on 2/20/18.  In the interval since her last visit, she was discharged home from the subacute nursing facility.  She has been seen by orthopedics and was cleared for weightbearing in regards to her left lower extremity fracture.  She has continued to improved somewhat in terms of her mobility and is able to stand on her own at a counter for a period of time.  She remains in a wheelchair today.  She does continue on anticoagulation with Lovenox and has not experienced any bleeding issues.  She was in to see her primary care physician Dr. Chanel and has been started on a higher dose of Cymbalta at 30 mg, with eventual plans in 2 weeks to increase to 60 mg daily.  She has not been requiring any significant degree of narcotic pain medication but does take an occasional Flexeril at night when she has been more active.  She did have a UTI at her last visit and received a course of Levaquin with resolution of symptoms.  In terms of her chemotherapy, she has tolerated her first cycle of Xeloda without any significant difficulty.  She denies any mucositis.  She denies any hand-foot symptoms.  She has not experienced any diarrhea although her stools are soft.  She denies significant worsening of fatigue.  She does continue with outpatient home health, physical therapy, and occupational therapy.    Past Medical History:   Diagnosis Date   • Acute pulmonary embolism, cancer-related 10/2017   • Allergic rhinitis    • Arthritis     Right knee; left shoulder   • Cancer 05/2010    Right breast   • Cancer 10/2017    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   • Charcot-Saloni-Tooth disease    • CPAP (continuous positive airway  pressure) dependence    • GERD (gastroesophageal reflux disease)    • H/O Colon polyps    • H/O Uterine fibroid    • Heart murmur    • Hyperlipidemia    • Hypertension    • PONV (postoperative nausea and vomiting)      Past Surgical History:   Procedure Laterality Date   • BLADDER SURGERY      Bladder lift   • BREAST RECONSTRUCTION, BREAST TISSUE EXPANDER INSERTION Right 2010   • BREAST SURGERY Right 2010    Mastectomy   •  SECTION  ,    • CHOLECYSTECTOMY     • COLONOSCOPY     • HERNIA REPAIR  2015    [facility]   • HYSTERECTOMY      Partial   • KNEE SURGERY Right    • LEG SURGERY Left     Broken   • MASTECTOMY Right 2010   • NJ TREAT TIBIAL SHAFT FX, INTRAMED IMPLANT Left 2017    Procedure: TIBIA INTRAMEDULLARY NAIL/DON INSERTION;  Surgeon: Romero Shanks MD;  Location: [facility];  Service: Orthopedics   • THORACIC DECOMPRESSION POSTERIOR FUSION WITH INSTRUMENTATION N/A 2017    Procedure: T6 costotransversectomy and decompression with T3 to  T9 posterior spinal fusion with instrumentation with Jennifer Gonzalez and Nael Wilkes;  Surgeon: Quan Nayak MD;  Location: [facility];  Service:        ONCOLOGIC HISTORY:  (History from previous dates can be found in the separate document.)    Current Outpatient Prescriptions on File Prior to Visit   Medication Sig Dispense Refill   • acetaminophen (TYLENOL) 500 MG tablet Take 500 mg by mouth Every 6 (Six) Hours As Needed for Mild Pain .     • capecitabine (XELODA) 500 MG chemo tablet Take 4 tabs (2000 mg) in the AM then take 3 tabs (1500 mg) in the PM for 14 days on then 7 days off. 98 tablet 3   • cyclobenzaprine (FLEXERIL) 10 MG tablet Take 1 tablet by mouth 3 (Three) Times a Day As Needed for Muscle Spasms. 30 tablet 3   • DULoxetine (CYMBALTA) 30 MG capsule Take 1 capsule by mouth Daily. 30 capsule 5   • FLONASE ALLERGY RELIEF 50 MCG/ACT nasal spray 2 sprays into each nostril daily.  11   • gabapentin (NEURONTIN) 300  MG capsule Take 1 capsule by mouth 3 (Three) Times a Day. 90 capsule 2   • glycerin (ADULT) 2 g suppository rectal suppository Insert 2 g into the rectum.     • HYDROmorphone (DILAUDID) 4 MG tablet Take 1 tablet by mouth Every 4 (Four) Hours As Needed for Moderate Pain . 20 tablet 0   • lactulose (CHRONULAC) 10 GM/15ML solution Take 30 mL by mouth 2 (Two) Times a Day. 1892 mL 2   • LORazepam (ATIVAN) 1 MG tablet Take 1 mg by mouth Every 8 (Eight) Hours As Needed for Anxiety.     • mesalamine (LIALDA) 1.2 G EC tablet Take 1,200 mg by mouth 2 (two) times a day.     • nystatin (MYCOSTATIN) 992819 UNIT/GM ointment Apply  topically 2 (Two) Times a Day.     • nystatin (nystatin) 353157 UNIT/GM powder Apply  topically 4 (Four) Times a Day.     • omeprazole (PriLOSEC) 40 MG capsule TAKE 1 CAPSULE DAILY 90 capsule 2   • ondansetron ODT (ZOFRAN-ODT) 4 MG disintegrating tablet DIS 1 T ON THE TONGUE Q 8 H PRF NAUSEA  0   • polyethylene glycol (MIRALAX) packet Take 17 g by mouth Daily.     • psyllium (METAMUCIL) 58.6 % packet Take 1 packet by mouth Daily.     • saccharomyces boulardii (FLORASTOR) 250 MG capsule Take 250 mg by mouth 2 (Two) Times a Day.     • sennosides-docusate sodium (SENOKOT-S) 8.6-50 MG tablet Take 2 tablets by mouth 2 (Two) Times a Day. 90 tablet 2   • traMADol (ULTRAM) 50 MG tablet Take 1 tablet by mouth Every 8 (Eight) Hours As Needed for Moderate Pain . 30 tablet 0   • Tuberculin PPD (APLISOL ID) Inject  into the skin.     • [DISCONTINUED] enoxaparin (LOVENOX) 100 MG/ML solution syringe Inject 1 mg/kg under the skin Every 12 (Twelve) Hours.       No current facility-administered medications on file prior to visit.        ALLERGIES:     Allergies   Allergen Reactions   • Hydrocodone Nausea Only   • Morphine And Related Nausea And Vomiting     Social History     Social History   • Marital status:      Spouse name: Murray   • Number of children: 3   • Years of education: College     Occupational  History   •  Retired     Social History Main Topics   • Smoking status: Never Smoker   • Smokeless tobacco: Never Used   • Alcohol use Yes      Comment: social   • Drug use: No   • Sexual activity: Defer     Other Topics Concern   • Not on file     Social History Narrative     Family History   Problem Relation Age of Onset   • Heart disease Mother    • Hyperlipidemia Mother    • Hypertension Mother    • Diabetes Father    • Charcot-Saloni-Tooth disease Father    • Heart disease Father    • Hypertension Father    • Heart failure Father    • Diabetes Sister    • Heart disease Sister    • Hypertension Sister    • Heart disease Maternal Aunt    • Scoliosis Maternal Aunt    • Heart disease Maternal Uncle    • Diabetes Maternal Grandmother    • Heart disease Maternal Grandmother    • Hypertension Maternal Grandmother    • Uterine cancer Maternal Grandmother    • Heart disease Maternal Grandfather      Review of Systems   Constitutional: Positive for activity change and fatigue. Negative for appetite change, fever and unexpected weight change.   HENT: Negative for congestion, mouth sores, nosebleeds, sore throat and voice change.    Respiratory: Negative for cough, shortness of breath and wheezing.    Cardiovascular: Negative for chest pain, palpitations and leg swelling.   Gastrointestinal: Negative for abdominal distention, abdominal pain, blood in stool, constipation, diarrhea, nausea and vomiting.   Endocrine: Negative for cold intolerance and heat intolerance.   Genitourinary: Negative for difficulty urinating, dysuria, frequency and hematuria.   Musculoskeletal: Positive for back pain. Negative for arthralgias, joint swelling and myalgias.   Skin: Negative for rash.   Neurological: Negative for dizziness, syncope, weakness, light-headedness, numbness and headaches.   Hematological: Negative for adenopathy. Does not bruise/bleed easily.   Psychiatric/Behavioral: Negative for confusion and sleep disturbance. The patient  is not nervous/anxious.          Objective      Vitals:    02/27/18 1052   BP: 138/86   Pulse: 95   Resp: 12   Temp: 98.4 °F (36.9 °C)   SpO2: 99%      Current Status 2/27/2018   ECOG score 2   Pain 0/10    Physical Exam   Constitutional: She is oriented to person, place, and time. She appears well-developed and well-nourished.   The patient is currently seated in a wheelchair in no distress.   HENT:   Mouth/Throat: Oropharynx is clear and moist.   Eyes: Conjunctivae are normal.   Neck: No thyromegaly present.   Cardiovascular: Normal rate and regular rhythm.  Exam reveals no gallop and no friction rub.    No murmur heard.  Pulmonary/Chest: Breath sounds normal. No respiratory distress.   Abdominal: Soft. Bowel sounds are normal. She exhibits no distension. There is no tenderness.   Musculoskeletal: She exhibits edema.   Lower extremity braces in place.  Trace bilateral lower extremity edema.   Lymphadenopathy:        Head (right side): No submandibular adenopathy present.     She has no cervical adenopathy.     She has no axillary adenopathy.        Right: No inguinal and no supraclavicular adenopathy present.        Left: No inguinal and no supraclavicular adenopathy present.   Neurological: She is alert and oriented to person, place, and time. No cranial nerve deficit.   Bilateral lower extremity strength, 3+ /5   Skin: Skin is warm and dry. No rash noted.   Psychiatric: She has a normal mood and affect. Her behavior is normal.       RECENT LABS:  Hematology WBC   Date Value Ref Range Status   02/27/2018 3.54 (L) 4.00 - 10.00 10*3/mm3 Final     RBC   Date Value Ref Range Status   02/27/2018 3.81 (L) 3.90 - 5.00 10*6/mm3 Final     Hemoglobin   Date Value Ref Range Status   02/27/2018 12.0 11.5 - 14.9 g/dL Final     Hematocrit   Date Value Ref Range Status   02/27/2018 38.4 34.0 - 45.0 % Final     Platelets   Date Value Ref Range Status   02/27/2018 321 150 - 375 10*3/mm3 Final        Lab Results   Component Value  Date    GLUCOSE 116 02/27/2018    BUN 7 02/27/2018    CREATININE 0.49 (L) 02/27/2018    EGFRIFNONA 130 02/27/2018    EGFRIFAFRI 80 09/25/2017    BCR 14.3 02/27/2018    K 4.2 02/27/2018    CO2 27.9 02/27/2018    CALCIUM 8.4 (L) 02/27/2018    PROTENTOTREF 6.4 09/25/2017    ALBUMIN 3.40 (L) 02/27/2018    LABIL2 1.2 02/27/2018    AST 20 02/27/2018    ALT 16 02/27/2018       Assessment/Plan      1. Previous Stage Ib ([id number]) right breast cancer:  · Diagnosed May 2010 with excisional biopsy for invasive ductal carcinoma, 1.3 cm, grade 2, ER 90%, OK 80%, HER-2/peter negative (1+ IHC).    · Subsequent right mastectomy in July 2010 with no residual breast malignancy, 1/5 sentinel lymph node with micrometastasis (0.25 mm).    · Treated in the [facility] with adjuvant AC ×4 cycles in 2010 (no taxanes administered due to underlying Charcot-Saloni-Tooth with peripheral neuropathy).    · Adjuvant Femara (postmenopausal) initiated October 2010 with plan to treat ×10 years.    · Genetic testing reportedly negative.    · Developed osteopenia treated with Prolia beginning 2/27/13. Subsequently discontinued due to identification of metastatic disease.  2. Recurrent/metastatic disease identified 10/8/17:  · Disease involving thoracic spine with cord compression at T6, lumbosacral involvement, sternal and right sternoclavicular involvement.    · Femara discontinued in 10/2017.    · Radiation administered (in the [facility] system) to the thoracic spine beginning 10/19/17 treating T3-T9 to a dose of 24 gray in 6 fractions.  · Evaluation with MRI 12/8/17 showing persistent T6 cord compression with persistent neurologic compromise requiring surgical treatment 12/11/17 with T6 laminectomy/corpectomy and T3-T9 fusion.  Pathology with metastatic carcinoma of breast origin, ER negative, OK negative, HER-2/peter negative (1+ IHC).  · Additional staging evaluation 12/8/17 with no evidence of visceral metastatic disease, bone scan showing  involvement of thoracic spine, sternum, left humerus, mid frontal bone.  No plane film correlate of left humerus lesion.  MRI lumbar spine with small intradural L3 metastasis.  CA 15-3 12/6/17- 17.  · Palliative radiation therapy to L3 dural metastasis and left humerus initiated 1/15/18 (10 fractions), completed 1/26/18.  · Hypercalcemia of malignancy with calcium in the 10-11 range.  · Initiation of monthly Xgeva 1/23/18.  · Baseline CT scan 1/30/18 with no evidence of visceral involvement.  There was a cluster of nodular opacities in the right lower lobe suspected to be infectious or related to bronchiolitis. Bone scan 1/30/18 showed postsurgical change in the thoracic spine, stable uptake in the frontal bone, no new areas of disease.  · Initiation of palliative oral single agent Xeloda 2/7/18 2000 mg a.m., 1500 mg p.m. for 14/21 days.   · She returns today in follow-up regarding to begin cycle 2 palliative single agent Xeloda.  She has tolerated the first cycle of therapy amazingly well with no significant side effects.  She did receive her dose of Xgeva last week on 2/20/18 and therefore will be due again in 3 weeks.  I have scheduled her for a patricia CBC in 2 weeks with RN review.  She will call us in the interval if she has any additional difficulties.  I will plan to see her in 3 weeks when she begins cycle 3.  Will recheck a CA 15-3.  At the end of cycle 3 we will plan repeat CT and bone scan.  3. Right pulmonary embolism:  · Diagnosed on CT angiogram 10/21/17 involving small right lower lobe pulmonary artery.  Lower extremity Dopplers negative.  · Bilateral lower extremity Dopplers negative again 12/5/17.  · Continuing on Lovenox 1 mg/kg (100 mg) subcutaneous injection every 12 hours.  4. Cancer related pain:  · Previously receiving Duragesic 50 µg patch every 72 hours along with Dilaudid 4 mg as needed for breakthrough pain  · The patient's pain improved over time and she was able to discontinue both  Duragesic and Dilaudid in the interval.  · Patient does take occasional Flexeril at bedtime due to back spasm/pain when she has been more active.  5. Constipation:  · Currently receiving lactulose 20 g twice a day, MiraLAX daily, Senokot 2 tablets twice daily.  Constipation is currently well controlled.  6. Traumatic left tibia/fibular fracture:  · Status post ORIF 12/6/17  · Specimen was sent for pathologic review, negative for evidence of malignancy  · She has now been cleared for weightbearing  7. Hypercalcemia:  · Suspect hypercalcemia of malignancy, calcium in  10-11 range previously.  · Calcium normalized following initiation of monthly Xgeva on 1/23/18.    Plan:  1. Proceed with cycle 2 Xeloda tomorrow at a dose of 2000 mg a.m., 1500 mg p.m. for 14/21 days  2. In 2 weeks CBC with RN review.  3. In 3 weeks M.D. visit with CBC, CMP, magnesium, phosphorus, CA 15-3.  The patient will be due for cycle 3 Xeloda as well as monthly Xgeva.  Will plan repeat CT and bone scan at the end of cycle 3.

## 2018-03-01 ENCOUNTER — TELEPHONE (OUTPATIENT)
Dept: FAMILY MEDICINE CLINIC | Facility: CLINIC | Age: 58
End: 2018-03-01

## 2018-03-01 NOTE — TELEPHONE ENCOUNTER
Staff informed;nurse will call you as soon as she gets results!    ----- Message from Jazmine Chanel MD sent at 2/28/2018  9:33 AM EST -----  Please check with home health. They were to get a UA on this patient. If they have not gotten this yet then please give a verbal order. They can call me w/ results [phone]  Thanks!

## 2018-03-02 ENCOUNTER — LAB REQUISITION (OUTPATIENT)
Dept: LAB | Facility: HOSPITAL | Age: 58
End: 2018-03-02

## 2018-03-02 DIAGNOSIS — N39.0 URINARY TRACT INFECTION: ICD-10-CM

## 2018-03-02 LAB
BACTERIA UR QL AUTO: ABNORMAL /HPF
BILIRUB UR QL STRIP: NEGATIVE
CLARITY UR: ABNORMAL
COLOR UR: YELLOW
GLUCOSE UR STRIP-MCNC: NEGATIVE MG/DL
HGB UR QL STRIP.AUTO: ABNORMAL
HYALINE CASTS UR QL AUTO: ABNORMAL /LPF
KETONES UR QL STRIP: NEGATIVE
LEUKOCYTE ESTERASE UR QL STRIP.AUTO: ABNORMAL
NITRITE UR QL STRIP: NEGATIVE
PH UR STRIP.AUTO: 6 [PH] (ref 4.5–8)
PROT UR QL STRIP: NEGATIVE
RBC # UR: ABNORMAL /HPF
REF LAB TEST METHOD: ABNORMAL
SP GR UR STRIP: 1.01 (ref 1–1.03)
SQUAMOUS #/AREA URNS HPF: ABNORMAL /HPF
UROBILINOGEN UR QL STRIP: ABNORMAL
WBC UR QL AUTO: ABNORMAL /HPF

## 2018-03-02 PROCEDURE — 87086 URINE CULTURE/COLONY COUNT: CPT | Performed by: INTERNAL MEDICINE

## 2018-03-02 PROCEDURE — 81001 URINALYSIS AUTO W/SCOPE: CPT | Performed by: INTERNAL MEDICINE

## 2018-03-02 PROCEDURE — 87077 CULTURE AEROBIC IDENTIFY: CPT | Performed by: INTERNAL MEDICINE

## 2018-03-02 PROCEDURE — 87186 SC STD MICRODIL/AGAR DIL: CPT | Performed by: INTERNAL MEDICINE

## 2018-03-04 LAB
BACTERIA SPEC AEROBE CULT: ABNORMAL
BACTERIA SPEC AEROBE CULT: ABNORMAL

## 2018-03-04 RX ORDER — NITROFURANTOIN 25; 75 MG/1; MG/1
100 CAPSULE ORAL EVERY 12 HOURS SCHEDULED
Qty: 14 CAPSULE | Refills: 0 | Status: SHIPPED | OUTPATIENT
Start: 2018-03-04 | End: 2018-03-17

## 2018-03-08 ENCOUNTER — TELEPHONE (OUTPATIENT)
Dept: INTERNAL MEDICINE | Facility: CLINIC | Age: 58
End: 2018-03-08

## 2018-03-08 NOTE — TELEPHONE ENCOUNTER
Pt called to ask for a rx for a cushion for her wheel chair. She dose not want gel.     Send to [phone]

## 2018-03-12 ENCOUNTER — LAB (OUTPATIENT)
Dept: OTHER | Facility: HOSPITAL | Age: 58
End: 2018-03-12

## 2018-03-12 ENCOUNTER — APPOINTMENT (OUTPATIENT)
Dept: OTHER | Facility: HOSPITAL | Age: 58
End: 2018-03-12

## 2018-03-12 ENCOUNTER — APPOINTMENT (OUTPATIENT)
Dept: LAB | Facility: HOSPITAL | Age: 58
End: 2018-03-12

## 2018-03-12 ENCOUNTER — OFFICE VISIT (OUTPATIENT)
Dept: ONCOLOGY | Facility: CLINIC | Age: 58
End: 2018-03-12

## 2018-03-12 ENCOUNTER — APPOINTMENT (OUTPATIENT)
Dept: ONCOLOGY | Facility: HOSPITAL | Age: 58
End: 2018-03-12

## 2018-03-12 DIAGNOSIS — Z17.1 MALIGNANT NEOPLASM OF OVERLAPPING SITES OF RIGHT BREAST IN FEMALE, ESTROGEN RECEPTOR NEGATIVE (HCC): ICD-10-CM

## 2018-03-12 DIAGNOSIS — C79.51 BONE METASTASES: ICD-10-CM

## 2018-03-12 DIAGNOSIS — C79.51 BONE METASTASES: Primary | ICD-10-CM

## 2018-03-12 DIAGNOSIS — C50.811 MALIGNANT NEOPLASM OF OVERLAPPING SITES OF RIGHT BREAST IN FEMALE, ESTROGEN RECEPTOR NEGATIVE (HCC): ICD-10-CM

## 2018-03-12 LAB
BASOPHILS # BLD AUTO: 0.04 10*3/MM3 (ref 0–0.2)
BASOPHILS NFR BLD AUTO: 0.7 % (ref 0–1.5)
DEPRECATED RDW RBC AUTO: 63.6 FL (ref 37–54)
EOSINOPHIL # BLD AUTO: 0.17 10*3/MM3 (ref 0–0.7)
EOSINOPHIL NFR BLD AUTO: 3.2 % (ref 0.3–6.2)
ERYTHROCYTE [DISTWIDTH] IN BLOOD BY AUTOMATED COUNT: 18.4 % (ref 11.7–13)
HCT VFR BLD AUTO: 38.3 % (ref 35.6–45.5)
HGB BLD-MCNC: 12.6 G/DL (ref 11.9–15.5)
IMM GRANULOCYTES # BLD: 0.02 10*3/MM3 (ref 0–0.03)
IMM GRANULOCYTES NFR BLD: 0.4 % (ref 0–0.5)
LYMPHOCYTES # BLD AUTO: 0.7 10*3/MM3 (ref 0.9–4.8)
LYMPHOCYTES NFR BLD AUTO: 13.1 % (ref 19.6–45.3)
MCH RBC QN AUTO: 31.9 PG (ref 26.9–32)
MCHC RBC AUTO-ENTMCNC: 32.9 G/DL (ref 32.4–36.3)
MCV RBC AUTO: 97 FL (ref 80.5–98.2)
MONOCYTES # BLD AUTO: 0.47 10*3/MM3 (ref 0.2–1.2)
MONOCYTES NFR BLD AUTO: 8.8 % (ref 5–12)
NEUTROPHILS # BLD AUTO: 3.96 10*3/MM3 (ref 1.9–8.1)
NEUTROPHILS NFR BLD AUTO: 73.8 % (ref 42.7–76)
NRBC BLD MANUAL-RTO: 0 /100 WBC (ref 0–0)
PLATELET # BLD AUTO: 286 10*3/MM3 (ref 140–500)
PMV BLD AUTO: 10.3 FL (ref 6–12)
RBC # BLD AUTO: 3.95 10*6/MM3 (ref 3.9–5.2)
WBC NRBC COR # BLD: 5.36 10*3/MM3 (ref 4.5–10.7)

## 2018-03-12 PROCEDURE — 36415 COLL VENOUS BLD VENIPUNCTURE: CPT

## 2018-03-12 PROCEDURE — 85025 COMPLETE CBC W/AUTO DIFF WBC: CPT | Performed by: INTERNAL MEDICINE

## 2018-03-12 PROCEDURE — 99212-NC PR NO CHARGE CBC OFFICE OUTPATIENT VISIT 10 MINUTES: Performed by: NURSE PRACTITIONER

## 2018-03-12 RX ORDER — ONDANSETRON 8 MG/1
8 TABLET, ORALLY DISINTEGRATING ORAL EVERY 8 HOURS PRN
Qty: 30 TABLET | Refills: 1 | Status: SHIPPED | OUTPATIENT
Start: 2018-03-12 | End: 2022-05-17 | Stop reason: SDUPTHER

## 2018-03-12 NOTE — PROGRESS NOTES
The patient returns today for CBC with nurse review, currently in her second cycle of Xeloda which she takes 2 weeks on, one week off.  She is about to begin her week off therapy.  Her counts remain adequate for treatment.  She is tolerating Xeloda reasonably well.  She does have intermittent soft stools with mild abdominal cramping.  We discussed the use of Imodium today as needed.  She also reports intermittent nausea.  I provided a refill of her Zofran ODT.  She is scheduled to follow-up with Dr. Hancock in 1 week, which time she'll be due to begin her third cycle of Xeloda, she'll also be due for monthly Xgeva.    Lab Results   Component Value Date    WBC 5.36 03/12/2018    HGB 12.6 03/12/2018    HCT 38.3 03/12/2018    MCV 97.0 03/12/2018     03/12/2018     Kylah Corrales, APRN  03/12/2018

## 2018-03-14 ENCOUNTER — TELEPHONE (OUTPATIENT)
Dept: ONCOLOGY | Facility: CLINIC | Age: 58
End: 2018-03-14

## 2018-03-14 NOTE — TELEPHONE ENCOUNTER
----- Message from Rhianna Torres RN sent at 3/14/2018  3:05 PM EDT -----  Patient is scheduled for Xgeva on 3/19/18 this is too soon. Patient's next Xgeva due on 3/20/18. Thanks

## 2018-03-15 ENCOUNTER — TELEPHONE (OUTPATIENT)
Dept: INTERNAL MEDICINE | Facility: CLINIC | Age: 58
End: 2018-03-15

## 2018-03-15 NOTE — TELEPHONE ENCOUNTER
Please see if patients oncologist can order. I can not order without an office visit. Please get her prior dosing from [facility].  Offer a muscle relaxer. Flexeril 10 mg po q 8 hour prn pain #30 x1

## 2018-03-15 NOTE — TELEPHONE ENCOUNTER
Asking if she can have Dilaudid ordered, says she received it from  [facility].     Pulled muscle during transfer & says she is taking tylenol & muscle relaxer. Not getting any better & says Dilaudid helped.    Please advise regarding her receiving this medication.    [phone]

## 2018-03-19 ENCOUNTER — APPOINTMENT (OUTPATIENT)
Dept: ONCOLOGY | Facility: HOSPITAL | Age: 58
End: 2018-03-19

## 2018-03-19 ENCOUNTER — APPOINTMENT (OUTPATIENT)
Dept: ONCOLOGY | Facility: CLINIC | Age: 58
End: 2018-03-19

## 2018-03-19 ENCOUNTER — APPOINTMENT (OUTPATIENT)
Dept: LAB | Facility: HOSPITAL | Age: 58
End: 2018-03-19

## 2018-03-19 NOTE — PROGRESS NOTES
Subjective .     REASONS FOR FOLLOWUP:    1. Stage Ib ([id number]) right breast cancer: Diagnosed May 2010 with excisional biopsy for invasive ductal carcinoma, 1.3 cm, grade 2, ER 90%, MT 80%, HER-2/peter negative (1+ IHC).  Subsequent right mastectomy in July 2010 with no residual breast malignancy, 1/5 sentinel lymph node with micrometastasis (0.25 mm).  Treated in the [facility] system with adjuvant AC ×4 cycles in 2010 (no taxanes administered due to underlying Charcot-Saloni-Tooth with peripheral neuropathy).  Adjuvant Femara (postmenopausal) initiated October 2010 with plan to treat ×10 years.  Genetic testing reportedly negative.  Developed osteopenia treated with Prolia beginning 2/27/13.  2. Recurrent/metastatic disease identified 10/8/17 involving thoracic spine with cord compression at T6, lumbosacral involvement, sternal and right sternoclavicular involvement.  Femara discontinued.  Radiation administered (in the [facility] system) to the thoracic spine beginning 10/19/17 treating T3-T9 to a dose of 24 gray in 6 fractions.  3. Evaluation with MRI 12/8/17 showing persistent T6 cord compression with persistent neurologic compromise requiring surgical treatment 12/11/17 with T6 laminectomy/corpectomy and T3-T9 fusion.  Pathology with metastatic carcinoma of breast origin, ER negative, MT negative, HER-2/peter negative (1+ IHC).  4. Right pulmonary embolism 10/21/17 continuing on Lovenox 1 mg/kg (100 mg) every 12 hours.  No evidence of DVT.  5. Cancer related pain previously on Duragesic 50 µg patch every 72 hours and Dilaudid 4 mg as needed for breakthrough pain.  Narcotics subsequently discontinued with improvement in pain in January 2018.  6. Radiation therapy to L3 dural metastasis and left humerus initiated 1/15/18 (10 fractions), completed 1/26/18  7. Monthly Xgeva initiated 1/23/18  8. Mild hypercalcemia of malignancy  9. Initiation of palliative oral single agent Xeloda 2/7/18 (2000 mg a.m., 1500 mg p.m. for  14/21 days).    HISTORY OF PRESENT ILLNESS:  The patient is a 57 y.o. year old female who is here for follow-up with the above-mentioned history.    History of Present Illness  The patient returns today in follow-up due to begin her third cycle of palliative Xeloda chemotherapy and continuing on monthly Xgeva last received on 2/20/18.  In the interval, the patient was seen in urgent care on 3/17/18 with complaint of a 1 week history of right hip pain radiating to the right knee.  There was consideration of bursitis of the hip however recommended to follow up with neurosurgery regarding potential need for repeat imaging of her back.  Was given a prescription for Dilaudid 10 tabs 4mg.    Patient returns today in follow-up reporting that her right hip pain was improved on Dilaudid.  It is not as tender as it was laterally.  The pain however is still present.  She tolerated the second cycle of Xeloda very well.  She had 1 oral ulceration which resolved.  She noted a loose stool for a day or so but no diarrhea.  She has not experienced any hand foot symptoms.  He does note recurrence of dysuria and is requesting a urinalysis today.  On 3/2/18 she did have a enterococcus faecalis UTI that was susceptible to nitrofurantoin 3/2/18 and received a course of treatment. She has continued to improve in terms of her mobility, now walking short distances with a walker.  She is in a wheelchair again in the office today.    Past Medical History:   Diagnosis Date   • Acute pulmonary embolism, cancer-related 10/2017   • Allergic rhinitis    • Arthritis     Right knee; left shoulder   • Cancer 05/2010    Right breast   • Cancer 10/2017    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   • Charcot-Saloni-Tooth disease    • CPAP (continuous positive airway pressure) dependence    • GERD (gastroesophageal reflux disease)    • H/O Colon polyps    • H/O Uterine fibroid    • Heart murmur    • Hyperlipidemia    • Hypertension    • PONV (postoperative  nausea and vomiting)      Past Surgical History:   Procedure Laterality Date   • BLADDER SURGERY      Bladder lift   • BREAST RECONSTRUCTION, BREAST TISSUE EXPANDER INSERTION Right 2010   • BREAST SURGERY Right 2010    Mastectomy   •  SECTION  ,    • CHOLECYSTECTOMY     • COLONOSCOPY     • HERNIA REPAIR  2015    [facility]   • HYSTERECTOMY      Partial   • KNEE SURGERY Right    • LEG SURGERY Left     Broken   • MASTECTOMY Right 2010   • [place] TREAT TIBIAL SHAFT FX, INTRAMED IMPLANT Left 2017    Procedure: TIBIA INTRAMEDULLARY NAIL/DON INSERTION;  Surgeon: Romero Shanks MD;  Location: [facility];  Service: Orthopedics   • THORACIC DECOMPRESSION POSTERIOR FUSION WITH INSTRUMENTATION N/A 2017    Procedure: T6 costotransversectomy and decompression with T3 to  T9 posterior spinal fusion with instrumentation with Jennifer Gonzalez and Nael Wilkes;  Surgeon: Quan Nayak MD;  Location: [facility];  Service:        ONCOLOGIC HISTORY:  (History from previous dates can be found in the separate document.)    Current Outpatient Prescriptions on File Prior to Visit   Medication Sig Dispense Refill   • acetaminophen (TYLENOL) 500 MG tablet Take 500 mg by mouth Every 6 (Six) Hours As Needed for Mild Pain .     • capecitabine (XELODA) 500 MG chemo tablet Take 4 tabs (2000 mg) in the AM then take 3 tabs (1500 mg) in the PM for 14 days on then 7 days off. 98 tablet 3   • cyclobenzaprine (FLEXERIL) 10 MG tablet Take 1 tablet by mouth 3 (Three) Times a Day As Needed for Muscle Spasms. 30 tablet 3   • DULoxetine (CYMBALTA) 30 MG capsule Take 1 capsule by mouth Daily. 30 capsule 5   • FLONASE ALLERGY RELIEF 50 MCG/ACT nasal spray 2 sprays into each nostril daily.  11   • gabapentin (NEURONTIN) 300 MG capsule Take 1 capsule by mouth 3 (Three) Times a Day. 90 capsule 2   • glycerin (ADULT) 2 g suppository rectal suppository Insert 2 g into the rectum.     • lactulose (CHRONULAC) 10 GM/15ML  solution Take 30 mL by mouth 2 (Two) Times a Day. 1892 mL 2   • mesalamine (LIALDA) 1.2 G EC tablet Take 1,200 mg by mouth 2 (two) times a day.     • nystatin (MYCOSTATIN) 989810 UNIT/GM ointment Apply  topically 2 (Two) Times a Day.     • nystatin (nystatin) 771915 UNIT/GM powder Apply  topically 4 (Four) Times a Day.     • omeprazole (PriLOSEC) 40 MG capsule TAKE 1 CAPSULE DAILY 90 capsule 2   • ondansetron ODT (ZOFRAN-ODT) 8 MG disintegrating tablet Take 1 tablet by mouth Every 8 (Eight) Hours As Needed for Nausea or Vomiting. 30 tablet 1   • polyethylene glycol (MIRALAX) packet Take 17 g by mouth Daily.     • psyllium (METAMUCIL) 58.6 % packet Take 1 packet by mouth Daily.     • saccharomyces boulardii (FLORASTOR) 250 MG capsule Take 250 mg by mouth 2 (Two) Times a Day.     • sennosides-docusate sodium (SENOKOT-S) 8.6-50 MG tablet Take 2 tablets by mouth 2 (Two) Times a Day. 90 tablet 2   • traMADol (ULTRAM) 50 MG tablet Take 1 tablet by mouth Every 8 (Eight) Hours As Needed for Moderate Pain . 30 tablet 0   • Tuberculin PPD (APLISOL ID) Inject  into the skin.     • [DISCONTINUED] enoxaparin (LOVENOX) 80 MG/0.8ML solution syringe   0   • [DISCONTINUED] HYDROmorphone (DILAUDID) 4 MG tablet Take 1 tablet by mouth Every 4 (Four) Hours As Needed for Moderate Pain . 10 tablet 0   • [DISCONTINUED] LORazepam (ATIVAN) 1 MG tablet Take 1 mg by mouth Every 8 (Eight) Hours As Needed for Anxiety.       No current facility-administered medications on file prior to visit.        ALLERGIES:     Allergies   Allergen Reactions   • Hydrocodone Nausea Only   • Morphine And Related Nausea And Vomiting     Social History     Social History   • Marital status:      Spouse name: Murray   • Number of children: 3   • Years of education: College     Occupational History   •  Retired     Social History Main Topics   • Smoking status: Never Smoker   • Smokeless tobacco: Never Used   • Alcohol use Yes      Comment: social   • Drug  use: No   • Sexual activity: Defer     Other Topics Concern   • Not on file     Social History Narrative   • No narrative on file     Family History   Problem Relation Age of Onset   • Heart disease Mother    • Hyperlipidemia Mother    • Hypertension Mother    • Diabetes Father    • Charcot-Saloni-Tooth disease Father    • Heart disease Father    • Hypertension Father    • Heart failure Father    • Diabetes Sister    • Heart disease Sister    • Hypertension Sister    • Heart disease Maternal Aunt    • Scoliosis Maternal Aunt    • Heart disease Maternal Uncle    • Diabetes Maternal Grandmother    • Heart disease Maternal Grandmother    • Hypertension Maternal Grandmother    • Uterine cancer Maternal Grandmother    • Heart disease Maternal Grandfather      Review of Systems   Constitutional: Positive for fatigue. Negative for activity change, appetite change, fever and unexpected weight change.   HENT: Positive for mouth sores. Negative for congestion, nosebleeds, sore throat and voice change.    Respiratory: Negative for cough, shortness of breath and wheezing.    Cardiovascular: Negative for chest pain, palpitations and leg swelling.   Gastrointestinal: Negative for abdominal distention, abdominal pain, blood in stool, constipation, diarrhea, nausea and vomiting.   Endocrine: Negative for cold intolerance and heat intolerance.   Genitourinary: Positive for dysuria. Negative for difficulty urinating, frequency and hematuria.   Musculoskeletal: Positive for back pain. Negative for arthralgias, joint swelling and myalgias.   Skin: Negative for rash.   Neurological: Positive for weakness. Negative for dizziness, syncope, light-headedness, numbness and headaches.   Hematological: Negative for adenopathy. Does not bruise/bleed easily.   Psychiatric/Behavioral: Negative for confusion and sleep disturbance. The patient is not nervous/anxious.          Objective      Vitals:    03/20/18 1229   BP: 140/78   Pulse: 95   Resp:  12   Temp: 98.6 °F (37 °C)   SpO2: 98%      Current Status 3/20/2018   ECOG score 3   Pain 7/10    Physical Exam   Constitutional: She is oriented to person, place, and time. She appears well-developed and well-nourished.   The patient is currently seated in a wheelchair in no distress.   HENT:   Mouth/Throat: Oropharynx is clear and moist.   Eyes: Conjunctivae are normal.   Neck: No thyromegaly present.   Cardiovascular: Normal rate and regular rhythm.  Exam reveals no gallop and no friction rub.    No murmur heard.  Pulmonary/Chest: Breath sounds normal. No respiratory distress.   Abdominal: Soft. Bowel sounds are normal. She exhibits no distension. There is no tenderness.   Musculoskeletal: She exhibits edema.   Lower extremity braces in place.  Trace bilateral lower extremity edema.   Lymphadenopathy:        Head (right side): No submandibular adenopathy present.     She has no cervical adenopathy.     She has no axillary adenopathy.        Right: No inguinal and no supraclavicular adenopathy present.        Left: No inguinal and no supraclavicular adenopathy present.   Neurological: She is alert and oriented to person, place, and time. No cranial nerve deficit.   Bilateral lower extremity strength, 3+to 4 /5   Skin: Skin is warm and dry. No rash noted.   Psychiatric: She has a normal mood and affect. Her behavior is normal.       RECENT LABS:  Hematology WBC   Date Value Ref Range Status   03/20/2018 3.47 (L) 4.00 - 10.00 10*3/mm3 Final     RBC   Date Value Ref Range Status   03/20/2018 3.25 (L) 3.90 - 5.00 10*6/mm3 Final     Hemoglobin   Date Value Ref Range Status   03/20/2018 10.4 (L) 11.5 - 14.9 g/dL Final     Hematocrit   Date Value Ref Range Status   03/20/2018 32.8 (L) 34.0 - 45.0 % Final     Platelets   Date Value Ref Range Status   03/20/2018 320 150 - 375 10*3/mm3 Final        Lab Results   Component Value Date    GLUCOSE 120 03/20/2018    BUN 18 03/20/2018    CREATININE 0.60 03/20/2018    EGFRIFNONA  103 03/20/2018    EGFRIFAFRI 80 09/25/2017    BCR 30.0 03/20/2018    K 4.6 03/20/2018    CO2 28.6 03/20/2018    CALCIUM 8.8 03/20/2018    PROTENTOTREF 6.4 09/25/2017    ALBUMIN 3.30 (L) 03/20/2018    LABIL2 1.2 03/20/2018    AST 19 03/20/2018    ALT 14 03/20/2018     Allises with 3+ leukocyte esterase, nitrite, 3+ bacteria, too numerous to count WBCs    Assessment/Plan      1. Previous Stage Ib ([id number]) right breast cancer:  · Diagnosed May 2010 with excisional biopsy for invasive ductal carcinoma, 1.3 cm, grade 2, ER 90%, [place] 80%, HER-2/peter negative (1+ IHC).    · Subsequent right mastectomy in July 2010 with no residual breast malignancy, 1/5 sentinel lymph node with micrometastasis (0.25 mm).    · Treated in the [facility] system with adjuvant AC ×4 cycles in 2010 (no taxanes administered due to underlying Charcot-Saloni-Tooth with peripheral neuropathy).    · Adjuvant Femara (postmenopausal) initiated October 2010 with plan to treat ×10 years.    · Genetic testing reportedly negative.    · Developed osteopenia treated with Prolia beginning 2/27/13. Subsequently discontinued due to identification of metastatic disease.  2. Recurrent/metastatic disease identified 10/8/17:  · Disease involving thoracic spine with cord compression at T6, lumbosacral involvement, sternal and right sternoclavicular involvement.    · Femara discontinued in 10/2017.    · Radiation administered (in the [facility] system) to the thoracic spine beginning 10/19/17 treating T3-T9 to a dose of 24 gray in 6 fractions.  · Evaluation with MRI 12/8/17 showing persistent T6 cord compression with persistent neurologic compromise requiring surgical treatment 12/11/17 with T6 laminectomy/corpectomy and T3-T9 fusion.  Pathology with metastatic carcinoma of breast origin, ER negative, NY negative, HER-2/peter negative (1+ IHC).  · Additional staging evaluation 12/8/17 with no evidence of visceral metastatic disease, bone scan showing involvement of thoracic  spine, sternum, left humerus, mid frontal bone.  No plane film correlate of left humerus lesion.  MRI lumbar spine with small intradural L3 metastasis.  CA 15-3 12/6/17- 17.  · Palliative radiation therapy to L3 dural metastasis and left humerus initiated 1/15/18 (10 fractions), completed 1/26/18.  · Hypercalcemia of malignancy with calcium in the 10-11 range.  · Initiation of monthly Xgeva 1/23/18.  · Baseline CT scan 1/30/18 with no evidence of visceral involvement.  There was a cluster of nodular opacities in the right lower lobe suspected to be infectious or related to bronchiolitis. Bone scan 1/30/18 showed postsurgical change in the thoracic spine, stable uptake in the frontal bone, no new areas of disease.  · Initiation of palliative oral single agent Xeloda 2/7/18 2000 mg a.m., 1500 mg p.m. for 14/21 days.   · She returns today in follow-up regarding to begin cycle 3 palliative single agent Xeloda.  In 10 use to tolerate treatment extremely well.  She has experienced some minimal mucositis and we will call in a prescription for Magic mouthwash to use if needed.  It is unclear whether her right hip pain is related to her malignancy and we are proceeding with MRI of the right hip within the next week and she will call for the report.  I suspect this may be related to bursitis as was felt that urgent care.  We will plan routine evaluation of her disease status in 2 weeks with CT chest abdomen and pelvis and bone scan.  I will see her back in 3 weeks for follow-up when she will be due for cycle 4 of Xeloda.  She is due for monthly Xgeva today as well.  3. Right pulmonary embolism:  · Diagnosed on CT angiogram 10/21/17 involving small right lower lobe pulmonary artery.  Lower extremity Dopplers negative.  · Bilateral lower extremity Dopplers negative again 12/5/17.  · Continuing on Lovenox 1 mg/kg (80 mg) subcutaneous injection every 12 hours.  A refill prescription was sent today.  4. Cancer related  pain:  · Previously receiving Duragesic 50 µg patch every 72 hours along with Dilaudid 4 mg as needed for breakthrough pain  · The patient's pain improved over time and she was able to discontinue both Duragesic and Dilaudid in the interval.  · Patient does take occasional Flexeril at bedtime due to back spasm/pain when she has been more active.  · Patient has experienced a flare of right hip pain as noted above.  She had point tenderness laterally and was felt in urgent care that she may have bursitis.  The pain has overall improved and she has received a 1 week supply of oral Dilaudid that is helping.  We're evaluating her hip further with MRI.  I have prescribed a month's supply of Dilaudid 4 mg every 4 hours as needed #60 with no refill today.  5. Constipation:  · Currently receiving lactulose 20 g twice a day, MiraLAX daily, Senokot 2 tablets twice daily.  Constipation is currently well controlled.  6. Traumatic left tibia/fibular fracture:  · Status post ORIF 12/6/17  · Specimen was sent for pathologic review, negative for evidence of malignancy  7. Hypercalcemia:  · Suspect hypercalcemia of malignancy, calcium in  10-11 range previously.  · Calcium normalized following initiation of monthly Xgeva on 1/23/18.  8. Chemotherapy-induced mucositis:  · Patient had a minimal degree of mucositis with cycle 2.  We will call in magic mouthwash to use as needed.  9. Recurrent UTI:  · Patient had an enterococcal UTI on 3/2/18 it was sensitive to nitrofurantoin and received treatment, unclear how long.  · Today she reports recurrence of her dysuria.  Repeat urinalysis shows evidence of UTI.  Prescribed nitrofurantoin 100 mg twice a day ×10 days and we will await urine culture result.  Suspect the patient may have some degree of neurogenic bladder contributing to recurrent UTI.    Plan:  1. Proceed with cycle 3 Xeloda tomorrow at a dose of 2000 mg a.m., 1500 mg p.m. for 14/21 days  2. Monthly Xgeva 120 mg subcutaneous  injection today.  3. Call in prescription for Magic mouthwash to use as needed for mucositis  4. Refill prescription sent for Lovenox 80 mg subcutaneous injection every 12 hours (#60) with 3 refills  5. Prescription provided for Dilaudid 4 mg every 4 hours as needed (#60) with no refill  6. Prescription sent for nitrofurantoin 100 mg every 12 hours ×10 days for UTI.  Urine culture pending.  7. MRI right hip this week.  Prescription given for Ativan 1 mg by mouth 20-30 minutes before the scan, may repeat ×1 if needed due to patient reported severe claustrophobia.  8. In 2 weeks CBC with RN review.  9. In 2 weeks CT chest abdomen pelvis and bone scan.  10. In 3 weeks M.D. visit with CBC, CMP. The patient will be due for cycle 3 Xeloda pending scan results

## 2018-03-20 ENCOUNTER — TELEPHONE (OUTPATIENT)
Dept: ONCOLOGY | Facility: HOSPITAL | Age: 58
End: 2018-03-20

## 2018-03-20 ENCOUNTER — INFUSION (OUTPATIENT)
Dept: ONCOLOGY | Facility: HOSPITAL | Age: 58
End: 2018-03-20

## 2018-03-20 ENCOUNTER — OFFICE VISIT (OUTPATIENT)
Dept: ONCOLOGY | Facility: CLINIC | Age: 58
End: 2018-03-20

## 2018-03-20 VITALS
HEART RATE: 95 BPM | OXYGEN SATURATION: 98 % | SYSTOLIC BLOOD PRESSURE: 140 MMHG | DIASTOLIC BLOOD PRESSURE: 78 MMHG | RESPIRATION RATE: 12 BRPM | TEMPERATURE: 98.6 F

## 2018-03-20 DIAGNOSIS — E83.52 HYPERCALCEMIA OF MALIGNANCY: ICD-10-CM

## 2018-03-20 DIAGNOSIS — C50.811 MALIGNANT NEOPLASM OF OVERLAPPING SITES OF RIGHT BREAST IN FEMALE, ESTROGEN RECEPTOR NEGATIVE (HCC): Primary | ICD-10-CM

## 2018-03-20 DIAGNOSIS — Z86.711 HISTORY OF PULMONARY EMBOLISM: ICD-10-CM

## 2018-03-20 DIAGNOSIS — M54.41 ACUTE RIGHT-SIDED LOW BACK PAIN WITH RIGHT-SIDED SCIATICA: ICD-10-CM

## 2018-03-20 DIAGNOSIS — Z17.1 MALIGNANT NEOPLASM OF OVERLAPPING SITES OF RIGHT BREAST IN FEMALE, ESTROGEN RECEPTOR NEGATIVE (HCC): ICD-10-CM

## 2018-03-20 DIAGNOSIS — C79.51 BONE METASTASES: Primary | ICD-10-CM

## 2018-03-20 DIAGNOSIS — Z17.1 MALIGNANT NEOPLASM OF OVERLAPPING SITES OF RIGHT BREAST IN FEMALE, ESTROGEN RECEPTOR NEGATIVE (HCC): Primary | ICD-10-CM

## 2018-03-20 DIAGNOSIS — G89.3 CANCER ASSOCIATED PAIN: ICD-10-CM

## 2018-03-20 DIAGNOSIS — R30.0 DYSURIA: ICD-10-CM

## 2018-03-20 DIAGNOSIS — M70.61 GREATER TROCHANTERIC BURSITIS OF RIGHT HIP: ICD-10-CM

## 2018-03-20 DIAGNOSIS — C50.811 MALIGNANT NEOPLASM OF OVERLAPPING SITES OF RIGHT BREAST IN FEMALE, ESTROGEN RECEPTOR NEGATIVE (HCC): ICD-10-CM

## 2018-03-20 LAB
BACTERIA UR QL AUTO: ABNORMAL /HPF
BILIRUB UR QL STRIP: NEGATIVE
CLARITY UR: ABNORMAL
COLOR UR: YELLOW
GLUCOSE UR STRIP-MCNC: NEGATIVE MG/DL
HGB UR QL STRIP.AUTO: ABNORMAL
HYALINE CASTS UR QL AUTO: ABNORMAL /LPF
KETONES UR QL STRIP: NEGATIVE
LEUKOCYTE ESTERASE UR QL STRIP.AUTO: ABNORMAL
NITRITE UR QL STRIP: POSITIVE
PH UR STRIP.AUTO: 6 [PH] (ref 4.5–8)
PROT UR QL STRIP: ABNORMAL
RBC # UR: ABNORMAL /HPF
REF LAB TEST METHOD: ABNORMAL
SP GR UR STRIP: 1.02 (ref 1–1.03)
SQUAMOUS #/AREA URNS HPF: ABNORMAL /HPF
UROBILINOGEN UR QL STRIP: ABNORMAL
WBC UR QL AUTO: ABNORMAL /HPF

## 2018-03-20 PROCEDURE — 25010000002 DENOSUMAB 120 MG/1.7ML SOLUTION: Performed by: INTERNAL MEDICINE

## 2018-03-20 PROCEDURE — 82607 VITAMIN B-12: CPT | Performed by: INTERNAL MEDICINE

## 2018-03-20 PROCEDURE — 87186 SC STD MICRODIL/AGAR DIL: CPT | Performed by: INTERNAL MEDICINE

## 2018-03-20 PROCEDURE — 81001 URINALYSIS AUTO W/SCOPE: CPT | Performed by: INTERNAL MEDICINE

## 2018-03-20 PROCEDURE — 87086 URINE CULTURE/COLONY COUNT: CPT | Performed by: INTERNAL MEDICINE

## 2018-03-20 PROCEDURE — 96372 THER/PROPH/DIAG INJ SC/IM: CPT | Performed by: INTERNAL MEDICINE

## 2018-03-20 PROCEDURE — 99215 OFFICE O/P EST HI 40 MIN: CPT | Performed by: INTERNAL MEDICINE

## 2018-03-20 PROCEDURE — 86300 IMMUNOASSAY TUMOR CA 15-3: CPT | Performed by: INTERNAL MEDICINE

## 2018-03-20 RX ORDER — NITROFURANTOIN 25; 75 MG/1; MG/1
100 CAPSULE ORAL EVERY 12 HOURS SCHEDULED
Qty: 20 CAPSULE | Refills: 0 | Status: SHIPPED | OUTPATIENT
Start: 2018-03-20 | End: 2018-04-11

## 2018-03-20 RX ORDER — LORAZEPAM 1 MG/1
1 TABLET ORAL
Qty: 2 TABLET | Refills: 0 | Status: SHIPPED | OUTPATIENT
Start: 2018-03-20 | End: 2018-03-20

## 2018-03-20 RX ORDER — HYDROMORPHONE HYDROCHLORIDE 4 MG/1
4 TABLET ORAL EVERY 4 HOURS PRN
Qty: 60 TABLET | Refills: 0 | Status: SHIPPED | OUTPATIENT
Start: 2018-03-20 | End: 2019-05-06

## 2018-03-20 RX ADMIN — DENOSUMAB 120 MG: 120 INJECTION SUBCUTANEOUS at 13:26

## 2018-03-20 NOTE — TELEPHONE ENCOUNTER
----- Message from Ubaldo Hancock Jr., MD sent at 3/20/2018  2:03 PM EDT -----  Regarding: UTI  Please notify patient I sent in script for nitrofurantoin for presumed UTI today x 10 days. Await culture results to see if it is sensitive.

## 2018-03-21 ENCOUNTER — TELEPHONE (OUTPATIENT)
Dept: ONCOLOGY | Facility: HOSPITAL | Age: 58
End: 2018-03-21

## 2018-03-21 NOTE — TELEPHONE ENCOUNTER
Called to thuy    ----- Message from Ubaldo Hancock Jr., MD sent at 3/20/2018  9:24 PM EDT -----  Regarding: magic mouthwash  Please call in MMW to patient's pharmacy, normal prescription 5cc swish/spit q4 hours as needed.

## 2018-03-22 ENCOUNTER — TELEPHONE (OUTPATIENT)
Dept: ONCOLOGY | Facility: HOSPITAL | Age: 58
End: 2018-03-22

## 2018-03-22 ENCOUNTER — DOCUMENTATION (OUTPATIENT)
Dept: ONCOLOGY | Facility: CLINIC | Age: 58
End: 2018-03-22

## 2018-03-22 ENCOUNTER — HOSPITAL ENCOUNTER (OUTPATIENT)
Dept: MRI IMAGING | Facility: HOSPITAL | Age: 58
Discharge: HOME | End: 2018-03-22
Attending: INTERNAL MEDICINE | Admitting: INTERNAL MEDICINE

## 2018-03-22 DIAGNOSIS — Z17.1 MALIGNANT NEOPLASM OF OVERLAPPING SITES OF RIGHT BREAST IN FEMALE, ESTROGEN RECEPTOR NEGATIVE (HCC): ICD-10-CM

## 2018-03-22 DIAGNOSIS — M70.61 GREATER TROCHANTERIC BURSITIS OF RIGHT HIP: ICD-10-CM

## 2018-03-22 DIAGNOSIS — Z86.711 HISTORY OF PULMONARY EMBOLISM: ICD-10-CM

## 2018-03-22 DIAGNOSIS — E83.52 HYPERCALCEMIA OF MALIGNANCY: ICD-10-CM

## 2018-03-22 DIAGNOSIS — C50.811 MALIGNANT NEOPLASM OF OVERLAPPING SITES OF RIGHT BREAST IN FEMALE, ESTROGEN RECEPTOR NEGATIVE (HCC): ICD-10-CM

## 2018-03-22 DIAGNOSIS — G89.3 CANCER ASSOCIATED PAIN: ICD-10-CM

## 2018-03-22 DIAGNOSIS — C79.51 BONE METASTASES: ICD-10-CM

## 2018-03-22 DIAGNOSIS — M54.41 ACUTE RIGHT-SIDED LOW BACK PAIN WITH RIGHT-SIDED SCIATICA: ICD-10-CM

## 2018-03-22 DIAGNOSIS — R30.0 DYSURIA: ICD-10-CM

## 2018-03-22 LAB
BACTERIA SPEC AEROBE CULT: ABNORMAL
BACTERIA SPEC AEROBE CULT: ABNORMAL

## 2018-03-22 PROCEDURE — 73723 MRI JOINT LWR EXTR W/O&W/DYE: CPT

## 2018-03-22 PROCEDURE — 0 GADOBENATE DIMEGLUMINE 529 MG/ML SOLUTION: Performed by: INTERNAL MEDICINE

## 2018-03-22 PROCEDURE — A9577 INJ MULTIHANCE: HCPCS | Performed by: INTERNAL MEDICINE

## 2018-03-22 RX ORDER — CIPROFLOXACIN 500 MG/1
500 TABLET, FILM COATED ORAL 2 TIMES DAILY
Qty: 14 TABLET | Refills: 0 | Status: SHIPPED | OUTPATIENT
Start: 2018-03-22 | End: 2018-03-30

## 2018-03-22 RX ADMIN — GADOBENATE DIMEGLUMINE 17 ML: 529 INJECTION, SOLUTION INTRAVENOUS at 18:30

## 2018-03-22 NOTE — TELEPHONE ENCOUNTER
Urine culture reviewed with Tiffany PAIGE NP  Pt taking Microbid as ordered per Dr Hancock.  Infection resistant to Macrobid.  Script for Cipro 500mg BID #14 escribed to pharm.  Pts  notified to stop Macrobid and  Cipro 500mg BID and take for 7 days   Pts  V/U

## 2018-03-22 NOTE — PROGRESS NOTES
Fax rec from [facility] stating pts Enoxaparin needed a PA. I have submitted this to [facility] through covermymeds.    Awaiting decision (within 72 hours)

## 2018-03-22 NOTE — TELEPHONE ENCOUNTER
REC'D ORDERS PER DR. RICHARDS FOR PT TO START ON OTC B12 1000MCG DAILY. ALSO PT TO D/C MACROBID AND START ON LEVAQUIN 500MG Q DAY X 7 D. PT V/U TO ALL.     CIPRO HAD ALREADY BEEN SENT IN AND PER KM NP THIS IS OK. LEVAQUIN WILL NOT BE SENT IN.

## 2018-03-23 ENCOUNTER — TELEPHONE (OUTPATIENT)
Dept: ONCOLOGY | Facility: CLINIC | Age: 58
End: 2018-03-23

## 2018-03-23 ENCOUNTER — DOCUMENTATION (OUTPATIENT)
Dept: ONCOLOGY | Facility: CLINIC | Age: 58
End: 2018-03-23

## 2018-03-23 NOTE — TELEPHONE ENCOUNTER
Called patient to check status of right hip pain.  She states it is improved as her to this weekend.  She denies any known injury.  She denies falling into anything, losing balance, or impact with any further equipment including Walker's or wheelchairs.  We did discuss that she has a large hematoma in the right hip and we've asked her to hold her Lovenox.  She will come into the office for evaluation on Wednesday by Glory Osborne, nurse practitioner and at that time we will determine when to restart her anticoagulation with discussions with Dr. Hancock.  The patient will call for any pain, change in mobility, no bleeding, or increased bruising in the meantime.

## 2018-03-26 ENCOUNTER — TELEPHONE (OUTPATIENT)
Dept: INTERNAL MEDICINE | Facility: CLINIC | Age: 58
End: 2018-03-26

## 2018-03-27 ENCOUNTER — OFFICE VISIT (OUTPATIENT)
Dept: ORTHOPEDIC SURGERY | Facility: CLINIC | Age: 58
End: 2018-03-27

## 2018-03-27 DIAGNOSIS — S22.000D CLOSED COMPRESSION FRACTURE OF THORACIC VERTEBRA WITH ROUTINE HEALING, SUBSEQUENT ENCOUNTER: Primary | ICD-10-CM

## 2018-03-27 PROCEDURE — 99213 OFFICE O/P EST LOW 20 MIN: CPT | Performed by: ORTHOPAEDIC SURGERY

## 2018-03-27 NOTE — PROGRESS NOTES
She is doing remarkably well now out of the brace.  No back pain to speak of.  No leg pain.  She remains with bilateral EHL weakness more on the left than the right.  I was probably mistaken last time and calling at 4/5 on the right she does seem to be able to rigidly extend the foot on the right against significant resistance but then when I have her simply dorsiflex the foot she can not do this.  Any event overall doing well no new x-rays today I will see her in 2 months for final x-ray the thoracic spine.

## 2018-03-28 ENCOUNTER — APPOINTMENT (OUTPATIENT)
Dept: ONCOLOGY | Facility: CLINIC | Age: 58
End: 2018-03-28

## 2018-03-28 ENCOUNTER — APPOINTMENT (OUTPATIENT)
Dept: LAB | Facility: HOSPITAL | Age: 58
End: 2018-03-28

## 2018-03-29 ENCOUNTER — OFFICE VISIT (OUTPATIENT)
Dept: NEUROSURGERY | Facility: CLINIC | Age: 58
End: 2018-03-29

## 2018-03-29 VITALS — DIASTOLIC BLOOD PRESSURE: 76 MMHG | HEART RATE: 95 BPM | SYSTOLIC BLOOD PRESSURE: 122 MMHG

## 2018-03-29 DIAGNOSIS — S22.058D OTHER CLOSED FRACTURE OF SIXTH THORACIC VERTEBRA WITH ROUTINE HEALING, SUBSEQUENT ENCOUNTER: Primary | ICD-10-CM

## 2018-03-29 PROCEDURE — 99212 OFFICE O/P EST SF 10 MIN: CPT | Performed by: NEUROLOGICAL SURGERY

## 2018-03-30 ENCOUNTER — OFFICE VISIT (OUTPATIENT)
Dept: INTERNAL MEDICINE | Facility: CLINIC | Age: 58
End: 2018-03-30

## 2018-03-30 VITALS — SYSTOLIC BLOOD PRESSURE: 134 MMHG | OXYGEN SATURATION: 95 % | HEART RATE: 94 BPM | DIASTOLIC BLOOD PRESSURE: 72 MMHG

## 2018-03-30 DIAGNOSIS — C50.811 MALIGNANT NEOPLASM OF OVERLAPPING SITES OF RIGHT BREAST IN FEMALE, ESTROGEN RECEPTOR NEGATIVE (HCC): ICD-10-CM

## 2018-03-30 DIAGNOSIS — Z78.0 POSTMENOPAUSAL: ICD-10-CM

## 2018-03-30 DIAGNOSIS — N39.0 URINARY TRACT INFECTION WITHOUT HEMATURIA, SITE UNSPECIFIED: Primary | ICD-10-CM

## 2018-03-30 DIAGNOSIS — S22.058D OTHER CLOSED FRACTURE OF SIXTH THORACIC VERTEBRA WITH ROUTINE HEALING, SUBSEQUENT ENCOUNTER: ICD-10-CM

## 2018-03-30 DIAGNOSIS — L65.9 ALOPECIA: ICD-10-CM

## 2018-03-30 DIAGNOSIS — Z17.1 MALIGNANT NEOPLASM OF OVERLAPPING SITES OF RIGHT BREAST IN FEMALE, ESTROGEN RECEPTOR NEGATIVE (HCC): ICD-10-CM

## 2018-03-30 PROCEDURE — 90632 HEPA VACCINE ADULT IM: CPT | Performed by: INTERNAL MEDICINE

## 2018-03-30 PROCEDURE — 99214 OFFICE O/P EST MOD 30 MIN: CPT | Performed by: INTERNAL MEDICINE

## 2018-03-30 PROCEDURE — 90732 PPSV23 VACC 2 YRS+ SUBQ/IM: CPT | Performed by: INTERNAL MEDICINE

## 2018-03-30 PROCEDURE — 90471 IMMUNIZATION ADMIN: CPT | Performed by: INTERNAL MEDICINE

## 2018-03-30 PROCEDURE — 77080 DXA BONE DENSITY AXIAL: CPT | Performed by: INTERNAL MEDICINE

## 2018-03-30 PROCEDURE — G0009 ADMIN PNEUMOCOCCAL VACCINE: HCPCS | Performed by: INTERNAL MEDICINE

## 2018-03-30 RX ORDER — PHENAZOPYRIDINE HYDROCHLORIDE 200 MG/1
200 TABLET, FILM COATED ORAL 3 TIMES DAILY PRN
Qty: 15 TABLET | Refills: 2 | Status: SHIPPED | OUTPATIENT
Start: 2018-03-30 | End: 2018-07-11

## 2018-03-30 RX ORDER — MESALAMINE 1.2 G/1
1200 TABLET, DELAYED RELEASE ORAL 2 TIMES DAILY
Qty: 180 TABLET | Refills: 1 | Status: SHIPPED | OUTPATIENT
Start: 2018-03-30 | End: 2018-10-10 | Stop reason: SDUPTHER

## 2018-03-30 NOTE — PROGRESS NOTES
Chief Complaint   Patient presents with   • Alopecia   mets breast cancer, spinal     History of Present Illness   Martha Davey is a 57 y.o. female presents for follow up evaluation w/ some new and acute needs. Having difficulty w alopecia related to chemotherapy. She has been struggling w/ walking and she has gone to the spinal center at [facility] and this has been hugely beneficial. She is now able to weight bear with a walker for short distances. She reports recent recurrent UTI.   Patient has a history of colitis. She is taking lialda for this. She is due for repeat scope but given multitude of issues at hand she is waiting until she is stronger to have this.   She was struggling w/ depression. She is taking cymbalta 30 mg daily w/ some benefit.     The following portions of the patient's history were reviewed and updated as appropriate: allergies, current medications, past family history, past medical history, past social history, past surgical history and problem list.  Current Outpatient Prescriptions on File Prior to Visit   Medication Sig Dispense Refill   • acetaminophen (TYLENOL) 500 MG tablet Take 500 mg by mouth Every 6 (Six) Hours As Needed for Mild Pain .     • capecitabine (XELODA) 500 MG chemo tablet Take 4 tabs (2000 mg) in the AM then take 3 tabs (1500 mg) in the PM for 14 days on then 7 days off. 98 tablet 3   • cyclobenzaprine (FLEXERIL) 10 MG tablet Take 1 tablet by mouth 3 (Three) Times a Day As Needed for Muscle Spasms. 30 tablet 3   • DULoxetine (CYMBALTA) 30 MG capsule Take 1 capsule by mouth Daily. 30 capsule 5   • FLONASE ALLERGY RELIEF 50 MCG/ACT nasal spray 2 sprays into each nostril daily.  11   • gabapentin (NEURONTIN) 300 MG capsule Take 1 capsule by mouth 3 (Three) Times a Day. 90 capsule 2   • HYDROmorphone (DILAUDID) 4 MG tablet Take 1 tablet by mouth Every 4 (Four) Hours As Needed for Moderate Pain . 60 tablet 0   • omeprazole (PriLOSEC) 40 MG capsule TAKE 1 CAPSULE DAILY  90 capsule 2   • ondansetron ODT (ZOFRAN-ODT) 8 MG disintegrating tablet Take 1 tablet by mouth Every 8 (Eight) Hours As Needed for Nausea or Vomiting. 30 tablet 1   • polyethylene glycol (MIRALAX) packet Take 17 g by mouth Daily.     • psyllium (METAMUCIL) 58.6 % packet Take 1 packet by mouth Daily.     • saccharomyces boulardii (FLORASTOR) 250 MG capsule Take 250 mg by mouth 2 (Two) Times a Day.     • sennosides-docusate sodium (SENOKOT-S) 8.6-50 MG tablet Take 2 tablets by mouth 2 (Two) Times a Day. 90 tablet 2   • traMADol (ULTRAM) 50 MG tablet Take 1 tablet by mouth Every 8 (Eight) Hours As Needed for Moderate Pain . 30 tablet 0   • Unable to find SWISH AND SPIT 5 ML PO Q 2 H PRN  0   • [DISCONTINUED] enoxaparin (LOVENOX) 80 MG/0.8ML solution syringe Inject 0.8 mL under the skin Every 12 (Twelve) Hours. 60 syringe 3   • [DISCONTINUED] mesalamine (LIALDA) 1.2 G EC tablet Take 1,200 mg by mouth 2 (two) times a day.     • glycerin (ADULT) 2 g suppository rectal suppository Insert 2 g into the rectum.     • lactulose (CHRONULAC) 10 GM/15ML solution Take 30 mL by mouth 2 (Two) Times a Day. 1892 mL 2   • nitrofurantoin, macrocrystal-monohydrate, (MACROBID) 100 MG capsule Take 1 capsule by mouth Every 12 (Twelve) Hours. 20 capsule 0   • nystatin (MYCOSTATIN) 540046 UNIT/GM ointment Apply  topically 2 (Two) Times a Day.     • nystatin (nystatin) 942803 UNIT/GM powder Apply  topically 4 (Four) Times a Day.     • Tuberculin PPD (APLISOL ID) Inject  into the skin.     • [DISCONTINUED] ciprofloxacin (CIPRO) 500 MG tablet Take 1 tablet by mouth 2 (Two) Times a Day. 14 tablet 0     No current facility-administered medications on file prior to visit.      Review of Systems   Constitutional: Positive for fatigue.   HENT: Negative.    Eyes: Negative.    Respiratory: Negative.    Cardiovascular: Negative.    Gastrointestinal: Negative.    Endocrine: Negative.    Genitourinary: Positive for frequency and urgency.    Musculoskeletal: Positive for arthralgias.   Skin: Negative.    Allergic/Immunologic: Negative.    Neurological: Negative.    Hematological: Negative.    Psychiatric/Behavioral: Negative.        Objective   Physical Exam   Constitutional: She is oriented to person, place, and time. She appears well-developed and well-nourished.   HENT:   Head: Normocephalic and atraumatic.   Right Ear: External ear normal.   Left Ear: External ear normal.   Nose: Nose normal.   Mouth/Throat: Oropharynx is clear and moist.   Eyes: EOM are normal. Pupils are equal, round, and reactive to light.   Neck: Normal range of motion. Neck supple.   Cardiovascular: Normal rate, regular rhythm and normal heart sounds.    Pulmonary/Chest: Effort normal and breath sounds normal.   Abdominal: Soft. Bowel sounds are normal.   Neurological: She is alert and oriented to person, place, and time.   Skin: Skin is warm and dry.   Psychiatric: She has a normal mood and affect. Her behavior is normal. Judgment and thought content normal.        /72   Pulse 94   LMP  (LMP Unknown)   SpO2 95%     Assessment/Plan   Diagnoses and all orders for this visit:    Urinary tract infection without hematuria, site unspecified  -     Urinalysis With / Culture If Indicated - Urine, Clean Catch    Other closed fracture of sixth thoracic vertebra with routine healing, subsequent encounter    Malignant neoplasm of overlapping sites of right breast in female, estrogen receptor negative    Other orders  -     minoxidil (ROGAINE) 2 % external solution; Apply  topically Daily.  -     phenazopyridine (PYRIDIUM) 200 MG tablet; Take 1 tablet by mouth 3 (Three) Times a Day As Needed for bladder spasms.  -     mesalamine (LIALDA) 1.2 g EC tablet; Take 1 tablet by mouth 2 (Two) Times a Day.        patietn w/ mets breast ca. Improved strength today. Will try rogaine.for ctx related alopecia. Will continue lialda for colitis and f/u w/ GI when able. Continue PT through  [facility]. Pyridium for suspected bladder spasm. Check u/a to ensure uti has cleared. Continue cymbalta for mood. Hep A and pneumovax today. F/u routinely.   SENAIT alvarez. Will forward to her spinal specialist.

## 2018-03-31 LAB
APPEARANCE UR: CLEAR
BACTERIA #/AREA URNS HPF: NORMAL /HPF
BILIRUB UR QL STRIP: NEGATIVE
COLOR UR: YELLOW
EPI CELLS #/AREA URNS HPF: NORMAL /HPF
GLUCOSE UR QL: NEGATIVE
HGB UR QL STRIP: NEGATIVE
KETONES UR QL STRIP: NEGATIVE
LEUKOCYTE ESTERASE UR QL STRIP: NEGATIVE
MICRO URNS: NORMAL
MICRO URNS: NORMAL
MUCOUS THREADS URNS QL MICRO: PRESENT /HPF
NITRITE UR QL STRIP: NEGATIVE
PH UR STRIP: 6 [PH] (ref 5–7.5)
PROT UR QL STRIP: NEGATIVE
RBC #/AREA URNS HPF: NORMAL /HPF
SP GR UR: 1.02 (ref 1–1.03)
URINALYSIS REFLEX: NORMAL
UROBILINOGEN UR STRIP-MCNC: 0.2 MG/DL (ref 0.2–1)
WBC #/AREA URNS HPF: NORMAL /HPF

## 2018-04-03 ENCOUNTER — LAB (OUTPATIENT)
Dept: OTHER | Facility: HOSPITAL | Age: 58
End: 2018-04-03

## 2018-04-03 ENCOUNTER — TELEPHONE (OUTPATIENT)
Dept: ONCOLOGY | Facility: CLINIC | Age: 58
End: 2018-04-03

## 2018-04-03 ENCOUNTER — OFFICE VISIT (OUTPATIENT)
Dept: ONCOLOGY | Facility: CLINIC | Age: 58
End: 2018-04-03

## 2018-04-03 DIAGNOSIS — C50.811 MALIGNANT NEOPLASM OF OVERLAPPING SITES OF RIGHT BREAST IN FEMALE, ESTROGEN RECEPTOR NEGATIVE (HCC): Primary | ICD-10-CM

## 2018-04-03 DIAGNOSIS — Z17.1 MALIGNANT NEOPLASM OF OVERLAPPING SITES OF RIGHT BREAST IN FEMALE, ESTROGEN RECEPTOR NEGATIVE (HCC): ICD-10-CM

## 2018-04-03 DIAGNOSIS — C50.811 MALIGNANT NEOPLASM OF OVERLAPPING SITES OF RIGHT BREAST IN FEMALE, ESTROGEN RECEPTOR NEGATIVE (HCC): ICD-10-CM

## 2018-04-03 DIAGNOSIS — G89.3 CANCER ASSOCIATED PAIN: ICD-10-CM

## 2018-04-03 DIAGNOSIS — M54.41 ACUTE RIGHT-SIDED LOW BACK PAIN WITH RIGHT-SIDED SCIATICA: ICD-10-CM

## 2018-04-03 DIAGNOSIS — Z17.1 MALIGNANT NEOPLASM OF OVERLAPPING SITES OF RIGHT BREAST IN FEMALE, ESTROGEN RECEPTOR NEGATIVE (HCC): Primary | ICD-10-CM

## 2018-04-03 DIAGNOSIS — Z86.711 HISTORY OF PULMONARY EMBOLISM: ICD-10-CM

## 2018-04-03 DIAGNOSIS — M70.61 GREATER TROCHANTERIC BURSITIS OF RIGHT HIP: ICD-10-CM

## 2018-04-03 DIAGNOSIS — C79.51 BONE METASTASES: ICD-10-CM

## 2018-04-03 DIAGNOSIS — E83.52 HYPERCALCEMIA OF MALIGNANCY: ICD-10-CM

## 2018-04-03 DIAGNOSIS — R30.0 DYSURIA: ICD-10-CM

## 2018-04-03 LAB
ANISOCYTOSIS BLD QL: ABNORMAL
DEPRECATED RDW RBC AUTO: 68.3 FL (ref 37–54)
EOSINOPHIL # BLD MANUAL: 0.17 10*3/MM3 (ref 0–0.7)
EOSINOPHIL NFR BLD MANUAL: 3 % (ref 0–7)
ERYTHROCYTE [DISTWIDTH] IN BLOOD BY AUTOMATED COUNT: 19.5 % (ref 11.7–13)
HCT VFR BLD AUTO: 36.6 % (ref 35.6–45.5)
HGB BLD-MCNC: 12.4 G/DL (ref 11.9–15.5)
LYMPHOCYTES # BLD MANUAL: 1.13 10*3/MM3 (ref 0.8–7)
LYMPHOCYTES NFR BLD MANUAL: 20 % (ref 24–44)
LYMPHOCYTES NFR BLD MANUAL: 3 % (ref 0–12)
MCH RBC QN AUTO: 33 PG (ref 26.9–32)
MCHC RBC AUTO-ENTMCNC: 33.9 G/DL (ref 32.4–36.3)
MCV RBC AUTO: 97.3 FL (ref 80.5–98.2)
METAMYELOCYTES NFR BLD MANUAL: 2 % (ref 0–0)
MONOCYTES # BLD AUTO: 0.17 10*3/MM3 (ref 0–1)
NEUTROPHILS # BLD AUTO: 4.08 10*3/MM3 (ref 1.9–8.1)
NEUTROPHILS NFR BLD MANUAL: 72 % (ref 42.7–76)
PLAT MORPH BLD: NORMAL
PLATELET # BLD AUTO: 322 10*3/MM3 (ref 140–500)
PMV BLD AUTO: 10.2 FL (ref 6–12)
RBC # BLD AUTO: 3.76 10*6/MM3 (ref 3.9–5.2)
SCAN SLIDE: NORMAL
WBC MORPH BLD: NORMAL
WBC NRBC COR # BLD: 5.67 10*3/MM3 (ref 4.5–10.7)

## 2018-04-03 PROCEDURE — 36415 COLL VENOUS BLD VENIPUNCTURE: CPT

## 2018-04-03 PROCEDURE — 99212-NC PR NO CHARGE CBC OFFICE OUTPATIENT VISIT 10 MINUTES: Performed by: NURSE PRACTITIONER

## 2018-04-03 PROCEDURE — 85025 COMPLETE CBC W/AUTO DIFF WBC: CPT | Performed by: INTERNAL MEDICINE

## 2018-04-03 PROCEDURE — 85007 BL SMEAR W/DIFF WBC COUNT: CPT | Performed by: INTERNAL MEDICINE

## 2018-04-03 NOTE — TELEPHONE ENCOUNTER
Patient notified to resume lovenox later this week at 2 weeks for 3/23.  She verbalized understanding.    LISA Alexander

## 2018-04-03 NOTE — PROGRESS NOTES
Patient is here for lab review.  She reports that she is doing well.  She is just about to finish up this cycle of Xeloda.  She is also questioning when she can resume her Lovenox injections.  They were held because of a hematoma in her right hip.  The patient states that the pain has significantly improved.    Lab Results   Component Value Date    WBC 5.67 04/03/2018    HGB 12.4 04/03/2018    HCT 36.6 04/03/2018    MCV 97.3 04/03/2018     04/03/2018     Patient currently scheduled to return for follow-up with Dr. Hancock in 1 week.    LISA Alexander

## 2018-04-03 NOTE — TELEPHONE ENCOUNTER
----- Message from Ubaldo Hancock Jr., MD sent at 4/3/2018  3:35 PM EDT -----  Regarding: RE:  Ask her to resume later this week at 2 weeks from 3/23  ----- Message -----  From: LISA Crowell  Sent: 4/3/2018   3:06 PM  To: Ubaldo Hancock Jr., MD  Subject: RE:                                              We asked her to hold it on 3/23, after MRI showed hematoma.    ----- Message -----  From: Ubaldo Hancock Jr., MD  Sent: 4/3/2018   2:49 PM  To: LISA Crowell  Subject: RE:                                              How long has she been off?  ----- Message -----  From: LISA Crowell  Sent: 4/3/2018  12:16 PM  To: Ubaldo Hancock Jr., MD    She is wondering about when she should resume her Lovenox injections.  The pain in her right hip has improved.    She is supposed to follow up with you next week.     Thanks,  Glory

## 2018-04-04 ENCOUNTER — HOSPITAL ENCOUNTER (OUTPATIENT)
Dept: NUCLEAR MEDICINE | Facility: HOSPITAL | Age: 58
Discharge: HOME | End: 2018-04-04
Attending: INTERNAL MEDICINE

## 2018-04-04 ENCOUNTER — HOSPITAL ENCOUNTER (OUTPATIENT)
Dept: CT IMAGING | Facility: HOSPITAL | Age: 58
Discharge: HOME | End: 2018-04-04
Attending: INTERNAL MEDICINE | Admitting: INTERNAL MEDICINE

## 2018-04-04 DIAGNOSIS — Z17.1 MALIGNANT NEOPLASM OF OVERLAPPING SITES OF RIGHT BREAST IN FEMALE, ESTROGEN RECEPTOR NEGATIVE (HCC): ICD-10-CM

## 2018-04-04 DIAGNOSIS — C50.811 MALIGNANT NEOPLASM OF OVERLAPPING SITES OF RIGHT BREAST IN FEMALE, ESTROGEN RECEPTOR NEGATIVE (HCC): ICD-10-CM

## 2018-04-04 DIAGNOSIS — C79.51 BONE METASTASES: ICD-10-CM

## 2018-04-04 LAB — CREAT BLDA-MCNC: 0.6 MG/DL (ref 0.6–1.3)

## 2018-04-04 PROCEDURE — 0 TECHNETIUM MEDRONATE KIT: Performed by: INTERNAL MEDICINE

## 2018-04-04 PROCEDURE — 82565 ASSAY OF CREATININE: CPT

## 2018-04-04 PROCEDURE — 78306 BONE IMAGING WHOLE BODY: CPT

## 2018-04-04 PROCEDURE — 71260 CT THORAX DX C+: CPT

## 2018-04-04 PROCEDURE — 25010000002 IOPAMIDOL 61 % SOLUTION: Performed by: INTERNAL MEDICINE

## 2018-04-04 PROCEDURE — A9503 TC99M MEDRONATE: HCPCS | Performed by: INTERNAL MEDICINE

## 2018-04-04 PROCEDURE — 74177 CT ABD & PELVIS W/CONTRAST: CPT

## 2018-04-04 RX ORDER — TC 99M MEDRONATE 20 MG/10ML
22.2 INJECTION, POWDER, LYOPHILIZED, FOR SOLUTION INTRAVENOUS
Status: COMPLETED | OUTPATIENT
Start: 2018-04-04 | End: 2018-04-04

## 2018-04-04 RX ADMIN — Medication 22.2 MILLICURIE: at 09:09

## 2018-04-04 RX ADMIN — IOPAMIDOL 85 ML: 612 INJECTION, SOLUTION INTRAVENOUS at 11:15

## 2018-04-10 ENCOUNTER — OFFICE VISIT (OUTPATIENT)
Dept: ONCOLOGY | Facility: CLINIC | Age: 58
End: 2018-04-10

## 2018-04-10 ENCOUNTER — LAB (OUTPATIENT)
Dept: LAB | Facility: HOSPITAL | Age: 58
End: 2018-04-10

## 2018-04-10 VITALS
RESPIRATION RATE: 16 BRPM | TEMPERATURE: 98.4 F | SYSTOLIC BLOOD PRESSURE: 132 MMHG | DIASTOLIC BLOOD PRESSURE: 82 MMHG | HEART RATE: 80 BPM | OXYGEN SATURATION: 99 %

## 2018-04-10 DIAGNOSIS — C79.51 BONE METASTASES: ICD-10-CM

## 2018-04-10 DIAGNOSIS — E83.52 HYPERCALCEMIA OF MALIGNANCY: ICD-10-CM

## 2018-04-10 DIAGNOSIS — G89.3 CANCER ASSOCIATED PAIN: ICD-10-CM

## 2018-04-10 DIAGNOSIS — Z17.1 MALIGNANT NEOPLASM OF OVERLAPPING SITES OF RIGHT BREAST IN FEMALE, ESTROGEN RECEPTOR NEGATIVE (HCC): Primary | ICD-10-CM

## 2018-04-10 DIAGNOSIS — I31.39 PERICARDIAL EFFUSION: ICD-10-CM

## 2018-04-10 DIAGNOSIS — R53.83 OTHER FATIGUE: ICD-10-CM

## 2018-04-10 DIAGNOSIS — C50.811 MALIGNANT NEOPLASM OF OVERLAPPING SITES OF RIGHT BREAST IN FEMALE, ESTROGEN RECEPTOR NEGATIVE (HCC): ICD-10-CM

## 2018-04-10 DIAGNOSIS — C50.811 MALIGNANT NEOPLASM OF OVERLAPPING SITES OF RIGHT BREAST IN FEMALE, ESTROGEN RECEPTOR NEGATIVE (HCC): Primary | ICD-10-CM

## 2018-04-10 DIAGNOSIS — Z17.1 MALIGNANT NEOPLASM OF OVERLAPPING SITES OF RIGHT BREAST IN FEMALE, ESTROGEN RECEPTOR NEGATIVE (HCC): ICD-10-CM

## 2018-04-10 LAB
ALBUMIN SERPL-MCNC: 3.8 G/DL (ref 3.5–5.2)
ALBUMIN/GLOB SERPL: 1.5 G/DL (ref 1.1–2.4)
ALP SERPL-CCNC: 78 U/L (ref 38–116)
ALT SERPL W P-5'-P-CCNC: 11 U/L (ref 0–33)
ANION GAP SERPL CALCULATED.3IONS-SCNC: 11.5 MMOL/L
AST SERPL-CCNC: 18 U/L (ref 0–32)
BASOPHILS # BLD AUTO: 0.02 10*3/MM3 (ref 0–0.1)
BASOPHILS NFR BLD AUTO: 0.6 % (ref 0–1.1)
BILIRUB SERPL-MCNC: 0.3 MG/DL (ref 0.1–1.2)
BUN BLD-MCNC: 12 MG/DL (ref 6–20)
BUN/CREAT SERPL: 18.2 (ref 7.3–30)
CALCIUM SPEC-SCNC: 9.3 MG/DL (ref 8.5–10.2)
CHLORIDE SERPL-SCNC: 105 MMOL/L (ref 98–107)
CO2 SERPL-SCNC: 28.5 MMOL/L (ref 22–29)
CREAT BLD-MCNC: 0.66 MG/DL (ref 0.6–1.1)
DEPRECATED RDW RBC AUTO: 75.2 FL (ref 37–49)
EOSINOPHIL # BLD AUTO: 0.19 10*3/MM3 (ref 0–0.36)
EOSINOPHIL NFR BLD AUTO: 5.9 % (ref 1–5)
ERYTHROCYTE [DISTWIDTH] IN BLOOD BY AUTOMATED COUNT: 20.3 % (ref 11.7–14.5)
GFR SERPL CREATININE-BSD FRML MDRD: 92 ML/MIN/1.73
GLOBULIN UR ELPH-MCNC: 2.6 GM/DL (ref 1.8–3.5)
GLUCOSE BLD-MCNC: 135 MG/DL (ref 74–124)
HCT VFR BLD AUTO: 36.4 % (ref 34–45)
HGB BLD-MCNC: 11.6 G/DL (ref 11.5–14.9)
IMM GRANULOCYTES # BLD: 0.01 10*3/MM3 (ref 0–0.03)
IMM GRANULOCYTES NFR BLD: 0.3 % (ref 0–0.5)
LYMPHOCYTES # BLD AUTO: 0.77 10*3/MM3 (ref 1–3.5)
LYMPHOCYTES NFR BLD AUTO: 23.8 % (ref 20–49)
MAGNESIUM SERPL-MCNC: 1.9 MG/DL (ref 1.8–2.5)
MCH RBC QN AUTO: 32.4 PG (ref 27–33)
MCHC RBC AUTO-ENTMCNC: 31.9 G/DL (ref 32–35)
MCV RBC AUTO: 101.7 FL (ref 83–97)
MONOCYTES # BLD AUTO: 0.38 10*3/MM3 (ref 0.25–0.8)
MONOCYTES NFR BLD AUTO: 11.7 % (ref 4–12)
NEUTROPHILS # BLD AUTO: 1.87 10*3/MM3 (ref 1.5–7)
NEUTROPHILS NFR BLD AUTO: 57.7 % (ref 39–75)
NRBC BLD MANUAL-RTO: 0 /100 WBC (ref 0–0)
PHOSPHATE SERPL-MCNC: 3.3 MG/DL (ref 2.5–4.5)
PLATELET # BLD AUTO: 269 10*3/MM3 (ref 150–375)
PMV BLD AUTO: 8.9 FL (ref 8.9–12.1)
POTASSIUM BLD-SCNC: 4.8 MMOL/L (ref 3.5–4.7)
PROT SERPL-MCNC: 6.4 G/DL (ref 6.3–8)
RBC # BLD AUTO: 3.58 10*6/MM3 (ref 3.9–5)
SODIUM BLD-SCNC: 145 MMOL/L (ref 134–145)
WBC NRBC COR # BLD: 3.24 10*3/MM3 (ref 4–10)

## 2018-04-10 PROCEDURE — 84443 ASSAY THYROID STIM HORMONE: CPT | Performed by: INTERNAL MEDICINE

## 2018-04-10 PROCEDURE — 80053 COMPREHEN METABOLIC PANEL: CPT

## 2018-04-10 PROCEDURE — 83735 ASSAY OF MAGNESIUM: CPT

## 2018-04-10 PROCEDURE — 85025 COMPLETE CBC W/AUTO DIFF WBC: CPT

## 2018-04-10 PROCEDURE — 99215 OFFICE O/P EST HI 40 MIN: CPT | Performed by: INTERNAL MEDICINE

## 2018-04-10 PROCEDURE — 84100 ASSAY OF PHOSPHORUS: CPT

## 2018-04-10 PROCEDURE — 36415 COLL VENOUS BLD VENIPUNCTURE: CPT | Performed by: INTERNAL MEDICINE

## 2018-04-10 PROCEDURE — 84439 ASSAY OF FREE THYROXINE: CPT | Performed by: INTERNAL MEDICINE

## 2018-04-10 NOTE — PROGRESS NOTES
Subjective .     REASONS FOR FOLLOWUP:    1. Stage Ib ([id number]) right breast cancer: Diagnosed May 2010 with excisional biopsy for invasive ductal carcinoma, 1.3 cm, grade 2, ER 90%, NM 80%, HER-2/peter negative (1+ IHC).  Subsequent right mastectomy in July 2010 with no residual breast malignancy, 1/5 sentinel lymph node with micrometastasis (0.25 mm).  Treated in the [facility] system with adjuvant AC ×4 cycles in 2010 (no taxanes administered due to underlying Charcot-Saloni-Tooth with peripheral neuropathy).  Adjuvant Femara (postmenopausal) initiated October 2010 with plan to treat ×10 years.  Genetic testing reportedly negative.  Developed osteopenia treated with Prolia beginning 2/27/13.  2. Recurrent/metastatic disease identified 10/8/17 involving thoracic spine with cord compression at T6, lumbosacral involvement, sternal and right sternoclavicular involvement.  Femara discontinued.  Radiation administered (in the [facility] system) to the thoracic spine beginning 10/19/17 treating T3-T9 to a dose of 24 gray in 6 fractions.  3. Evaluation with MRI 12/8/17 showing persistent T6 cord compression with persistent neurologic compromise requiring surgical treatment 12/11/17 with T6 laminectomy/corpectomy and T3-T9 fusion.  Pathology with metastatic carcinoma of breast origin, ER negative, NM negative, HER-2/peter negative (1+ IHC).  4. Right pulmonary embolism 10/21/17 continuing on Lovenox 1 mg/kg (100 mg) every 12 hours.  No evidence of DVT.  Lovenox held due to right gluteus hematoma 3/23/18 through 4/6/18.  5. Cancer related pain previously on Duragesic 50 µg patch every 72 hours and Dilaudid 4 mg as needed for breakthrough pain.  Narcotics subsequently discontinued with improvement in pain in January 2018.  6. Radiation therapy to L3 dural metastasis and left humerus initiated 1/15/18 (10 fractions), completed 1/26/18  7. Monthly Xgeva initiated 1/23/18  8. Mild hypercalcemia of malignancy  9. Initiation of  palliative oral single agent Xeloda 2/7/18 (2000 mg a.m., 1500 mg p.m. for 14/21 days).    HISTORY OF PRESENT ILLNESS:  The patient is a 57 y.o. year old female who is here for follow-up with the above-mentioned history.    History of Present Illness  The patient returns today in follow-up due to begin her 4th cycle of palliative Xeloda chemotherapy and continuing on monthly Xgeva last received on 3/20/18 with CT scan, bone scan, and laboratory studies to review.  Interval, she did undergo MRI of the right hip 3/22/18 which identified the etiology of her pain to be a hematoma in the right gluteal region measuring 5.7 x 4.3 x 11 cm.  Lovenox was held for 2 weeks from 3/23/18 through 4/6/18.  In the interval, the patient notes that her right hip pain has essentially resolved.  She has continued to tolerate oral Xeloda extremely well.  She denies any mucositis.  She denies any diarrhea.  She does note a normal appetite.  She is no longer undergoing home physical therapy for the past 2 weeks as she has been accepted in the program at [facility] however has not yet received her initial appointment there.  She recently received another course of antibiotics for UTI a few weeks ago.  She has continued to experience hair loss related to chemotherapy and this is quite disconcerting for her.  She does note increased sensitivity to both heat and cold.    Past Medical History:   Diagnosis Date   • Acute pulmonary embolism, cancer-related 10/2017   • Allergic rhinitis    • Arthritis     Right knee; left shoulder   • Cancer 05/2010    Right breast   • Cancer 10/2017    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   • Charcot-Saloni-Tooth disease    • CPAP (continuous positive airway pressure) dependence    • GERD (gastroesophageal reflux disease)    • H/O Colon polyps    • H/O Uterine fibroid    • Heart murmur    • Hyperlipidemia    • Hypertension    • PONV (postoperative nausea and vomiting)      Past Surgical History:    Procedure Laterality Date   • BLADDER SURGERY      Bladder lift   • BREAST RECONSTRUCTION, BREAST TISSUE EXPANDER INSERTION Right 2010   • BREAST SURGERY Right 2010    Mastectomy   •  SECTION  ,    • CHOLECYSTECTOMY     • COLONOSCOPY     • HERNIA REPAIR  2015    [facility]   • HYSTERECTOMY      Partial   • KNEE SURGERY Right    • LEG SURGERY Left     Broken   • MASTECTOMY Right    • FL TREAT TIBIAL SHAFT FX, INTRAMED IMPLANT Left 2017    Procedure: TIBIA INTRAMEDULLARY NAIL/DON INSERTION;  Surgeon: Romero Shanks MD;  Location: [facility];  Service: Orthopedics   • THORACIC DECOMPRESSION POSTERIOR FUSION WITH INSTRUMENTATION N/A 2017    Procedure: T6 costotransversectomy and decompression with T3 to  T9 posterior spinal fusion with instrumentation with Jennifer Gonzalez and Nael Wilkes;  Surgeon: Quan Nayak MD;  Location: [facility];  Service:        ONCOLOGIC HISTORY:  (History from previous dates can be found in the separate document.)    Current Outpatient Prescriptions on File Prior to Visit   Medication Sig Dispense Refill   • acetaminophen (TYLENOL) 500 MG tablet Take 500 mg by mouth Every 6 (Six) Hours As Needed for Mild Pain .     • capecitabine (XELODA) 500 MG chemo tablet Take 4 tabs (2000 mg) in the AM then take 3 tabs (1500 mg) in the PM for 14 days on then 7 days off. 98 tablet 3   • cyclobenzaprine (FLEXERIL) 10 MG tablet Take 1 tablet by mouth 3 (Three) Times a Day As Needed for Muscle Spasms. 30 tablet 3   • DULoxetine (CYMBALTA) 30 MG capsule Take 1 capsule by mouth Daily. 30 capsule 5   • FLONASE ALLERGY RELIEF 50 MCG/ACT nasal spray 2 sprays into each nostril daily.  11   • gabapentin (NEURONTIN) 300 MG capsule Take 1 capsule by mouth 3 (Three) Times a Day. 90 capsule 2   • glycerin (ADULT) 2 g suppository rectal suppository Insert 2 g into the rectum.     • HYDROmorphone (DILAUDID) 4 MG tablet Take 1 tablet by mouth Every 4 (Four) Hours  As Needed for Moderate Pain . 60 tablet 0   • lactulose (CHRONULAC) 10 GM/15ML solution Take 30 mL by mouth 2 (Two) Times a Day. 1892 mL 2   • mesalamine (LIALDA) 1.2 g EC tablet Take 1 tablet by mouth 2 (Two) Times a Day. 180 tablet 1   • minoxidil (ROGAINE) 2 % external solution Apply  topically Daily. 120 mL 1   • nitrofurantoin, macrocrystal-monohydrate, (MACROBID) 100 MG capsule Take 1 capsule by mouth Every 12 (Twelve) Hours. 20 capsule 0   • nystatin (MYCOSTATIN) 508960 UNIT/GM ointment Apply  topically 2 (Two) Times a Day.     • nystatin (nystatin) 514196 UNIT/GM powder Apply  topically 4 (Four) Times a Day.     • omeprazole (PriLOSEC) 40 MG capsule TAKE 1 CAPSULE DAILY 90 capsule 2   • ondansetron ODT (ZOFRAN-ODT) 8 MG disintegrating tablet Take 1 tablet by mouth Every 8 (Eight) Hours As Needed for Nausea or Vomiting. 30 tablet 1   • phenazopyridine (PYRIDIUM) 200 MG tablet Take 1 tablet by mouth 3 (Three) Times a Day As Needed for bladder spasms. 15 tablet 2   • polyethylene glycol (MIRALAX) packet Take 17 g by mouth Daily.     • psyllium (METAMUCIL) 58.6 % packet Take 1 packet by mouth Daily.     • saccharomyces boulardii (FLORASTOR) 250 MG capsule Take 250 mg by mouth 2 (Two) Times a Day.     • sennosides-docusate sodium (SENOKOT-S) 8.6-50 MG tablet Take 2 tablets by mouth 2 (Two) Times a Day. 90 tablet 2   • traMADol (ULTRAM) 50 MG tablet Take 1 tablet by mouth Every 8 (Eight) Hours As Needed for Moderate Pain . 30 tablet 0   • Tuberculin PPD (APLISOL ID) Inject  into the skin.     • Unable to find SWISH AND SPIT 5 ML PO Q 2 H PRN  0     No current facility-administered medications on file prior to visit.        ALLERGIES:     Allergies   Allergen Reactions   • Hydrocodone Nausea Only   • Morphine And Related Nausea And Vomiting     Social History     Social History   • Marital status:      Spouse name: Murray   • Number of children: 3   • Years of education: College     Occupational History   •   Retired     Social History Main Topics   • Smoking status: Never Smoker   • Smokeless tobacco: Never Used   • Alcohol use Yes      Comment: social   • Drug use: No   • Sexual activity: Defer     Other Topics Concern   • Not on file     Social History Narrative   • No narrative on file     Family History   Problem Relation Age of Onset   • Heart disease Mother    • Hyperlipidemia Mother    • Hypertension Mother    • Diabetes Father    • Charcot-Saloni-Tooth disease Father    • Heart disease Father    • Hypertension Father    • Heart failure Father    • Diabetes Sister    • Heart disease Sister    • Hypertension Sister    • Heart disease Maternal Aunt    • Scoliosis Maternal Aunt    • Heart disease Maternal Uncle    • Diabetes Maternal Grandmother    • Heart disease Maternal Grandmother    • Hypertension Maternal Grandmother    • Uterine cancer Maternal Grandmother    • Heart disease Maternal Grandfather      Review of Systems   Constitutional: Positive for fatigue. Negative for activity change, appetite change, fever and unexpected weight change.   HENT: Negative for congestion, mouth sores, nosebleeds, sore throat and voice change.    Respiratory: Negative for cough, shortness of breath and wheezing.    Cardiovascular: Negative for chest pain, palpitations and leg swelling.   Gastrointestinal: Negative for abdominal distention, abdominal pain, blood in stool, constipation, diarrhea, nausea and vomiting.   Endocrine: Negative for cold intolerance and heat intolerance.   Genitourinary: Negative for difficulty urinating, dysuria, frequency and hematuria.   Musculoskeletal: Negative for arthralgias, back pain, joint swelling and myalgias.   Skin: Negative for rash.   Neurological: Positive for weakness. Negative for dizziness, syncope, light-headedness, numbness and headaches.   Hematological: Negative for adenopathy. Does not bruise/bleed easily.   Psychiatric/Behavioral: Negative for confusion and sleep disturbance.  The patient is not nervous/anxious.          Objective      Vitals:    04/10/18 1230   BP: 132/82   Pulse: 80   Resp: 16   Temp: 98.4 °F (36.9 °C)   SpO2: 99%      Current Status 4/10/2018   ECOG score 3   Pain 0/10    Physical Exam   Constitutional: She is oriented to person, place, and time. She appears well-developed and well-nourished.   The patient is currently seated in a wheelchair in no distress.   HENT:   Mouth/Throat: Oropharynx is clear and moist.   Eyes: Conjunctivae are normal.   Neck: No thyromegaly present.   Cardiovascular: Normal rate and regular rhythm.  Exam reveals no gallop and no friction rub.    No murmur heard.  Pulmonary/Chest: Breath sounds normal. No respiratory distress.   Abdominal: Soft. Bowel sounds are normal. She exhibits no distension. There is no tenderness.   Musculoskeletal: She exhibits edema.   Lower extremity braces in place.  Trace bilateral lower extremity edema.   Lymphadenopathy:        Head (right side): No submandibular adenopathy present.     She has no cervical adenopathy.     She has no axillary adenopathy.        Right: No inguinal and no supraclavicular adenopathy present.        Left: No inguinal and no supraclavicular adenopathy present.   Neurological: She is alert and oriented to person, place, and time. No cranial nerve deficit.   Bilateral lower extremity strength, 3+to 4 /5   Skin: Skin is warm and dry. No rash noted.   Psychiatric: She has a normal mood and affect. Her behavior is normal.       RECENT LABS:  Hematology WBC   Date Value Ref Range Status   04/10/2018 3.24 (L) 4.00 - 10.00 10*3/mm3 Final     RBC   Date Value Ref Range Status   04/10/2018 3.58 (L) 3.90 - 5.00 10*6/mm3 Final     Hemoglobin   Date Value Ref Range Status   04/10/2018 11.6 11.5 - 14.9 g/dL Final     Hematocrit   Date Value Ref Range Status   04/10/2018 36.4 34.0 - 45.0 % Final     Platelets   Date Value Ref Range Status   04/10/2018 269 150 - 375 10*3/mm3 Final        Lab Results    Component Value Date    GLUCOSE 135 (H) 04/10/2018    BUN 12 04/10/2018    CREATININE 0.66 04/10/2018    EGFRIFNONA 92 04/10/2018    EGFRIFAFRI 80 09/25/2017    BCR 18.2 04/10/2018    K 4.8 (H) 04/10/2018    CO2 28.5 04/10/2018    CALCIUM 9.3 04/10/2018    PROTENTOTREF 6.4 09/25/2017    ALBUMIN 3.80 04/10/2018    LABIL2 1.5 04/10/2018    AST 18 04/10/2018    ALT 11 04/10/2018     MRI right hip 3/22/18:  IMPRESSION:  Heterogeneous signal collection of material within the right  gluteus medius muscle is new in comparison to abdomen CT 01/30/2018 and  has signal characteristics and an appearance most typical for  intramuscular hematoma. This measures 5.7 x 4.3 x 11 cm. There is some  adjacent muscular edema and enhancement.     There are small foci of osseous metastasis in the pelvis and in the  lower lumbar spine. Those in the lower lumbar spine are not appreciably  changed in comparison to lumbar MRI performed 12/07/2017.    CT chest 4/4/18: I did personally review CT images in the computer today and discussed the findings with Dr Andrade in radiology.  CONCLUSION:  1.  Stable appearance of the thoracic spine, there is a fixator again  noted in position. Sternal metastasis similar to the previous  examination.   2.  Subcutaneous nodule has developed in the right chest wall laterally.  3.  Small pericardial effusion now demonstrated, no pericardial nodule.  4.  Right mastectomy site is stable and satisfactory in appearance.  5.  Cluster of noncalcified pulmonary nodules within the right lower  lobe stable.  6.  Hiatal hernia.    CT abdomen and pelvis 4/4/18:  CONCLUSION:  1. Supraumbilical ventral hernia.  2. Diverticulosis.  3. Diffuse bladder wall thickening again demonstrated with increased  nodular thickening at the left base, this could be  infectious/inflammatory or neoplastic and cystoscopy if further  evaluation is warranted. Kidneys are unremarkable, no uroepithelial  abnormality of either ureter seen at  this time.  4. Unchanged right iliac bone lesion.    Bone scan 4/4/18:  IMPRESSION:  No interval change.    Assessment/Plan      1. Previous Stage Ib ([id number]) right breast cancer:  · Diagnosed May 2010 with excisional biopsy for invasive ductal carcinoma, 1.3 cm, grade 2, ER 90%, SD 80%, HER-2/peter negative (1+ IHC).    · Subsequent right mastectomy in July 2010 with no residual breast malignancy, 1/5 sentinel lymph node with micrometastasis (0.25 mm).    · Treated in the [facility] system with adjuvant AC ×4 cycles in 2010 (no taxanes administered due to underlying Charcot-Saloni-Tooth with peripheral neuropathy).    · Adjuvant Femara (postmenopausal) initiated October 2010 with plan to treat ×10 years.    · Genetic testing reportedly negative.    · Developed osteopenia treated with Prolia beginning 2/27/13. Subsequently discontinued due to identification of metastatic disease.  2. Recurrent/metastatic disease identified 10/8/17:  · Disease involving thoracic spine with cord compression at T6, lumbosacral involvement, sternal and right sternoclavicular involvement.    · Femara discontinued in 10/2017.    · Radiation administered (in the [facility] system) to the thoracic spine beginning 10/19/17 treating T3-T9 to a dose of 24 gray in 6 fractions.  · Evaluation with MRI 12/8/17 showing persistent T6 cord compression with persistent neurologic compromise requiring surgical treatment 12/11/17 with T6 laminectomy/corpectomy and T3-T9 fusion.  Pathology with metastatic carcinoma of breast origin, ER negative, SD negative, HER-2/peter negative (1+ IHC).  · Additional staging evaluation 12/8/17 with no evidence of visceral metastatic disease, bone scan showing involvement of thoracic spine, sternum, left humerus, mid frontal bone.  No plane film correlate of left humerus lesion.  MRI lumbar spine with small intradural L3 metastasis.  CA 15-3 12/6/17- 17.  · Palliative radiation therapy to L3 dural metastasis and left humerus  initiated 1/15/18 (10 fractions), completed 1/26/18.  · Hypercalcemia of malignancy with calcium in the 10-11 range.  · Initiation of monthly Xgeva 1/23/18.  · Baseline CT scan 1/30/18 with no evidence of visceral involvement.  Cluster of nodular opacities in the right lower lobe suspected to be infectious or related to bronchiolitis. Bone scan 1/30/18 showed postsurgical change in the thoracic spine, stable uptake in the frontal bone, no new areas of disease.  · Initiation of palliative oral single agent Xeloda 2/7/18 2000 mg a.m., 1500 mg p.m. for 14/21 days.   · Patient returns today in follow-up regarding to begin cycle 4 palliative single agent Xeloda with CT scan and bone scan to review from 4/4/18.  Bone scan showed no change from the prior study.  CT scan showed a small pericardial effusion of unclear significance and we will evaluate this further with echocardiogram.  CT also showed a subcutaneous nodule in the right lateral chest wall.  I did contact Dr. Andrade in radiology and we will review the scan at length today.  Also reviewed the scan at length with the patient and her  in the exam room.  There was a more elongated abnormality present on the previous scan in this location, currently with a rounded nodular solid appearance.  Per radiology this may represent a sebaceous cyst but could certainly be a metastatic lesion.  Since her disease overall remained stable with the exception of this lesion I have asked her to proceed on with her fourth cycle of Xeloda tomorrow as planned.  She will be due next week for Xgeva.  In the interval, we will also pursue an ultrasound-guided biopsy of this fairly superficial chest wall lesion.  I will see her back in 2 weeks to review the results.  3. Right hip/gluteal hematoma:  · The patient had developed considerable pain in the right hip and underwent MRI 3/22/18 visit showed a 5.7 x 4.3 x 11 cm hematoma in the right gluteal region.  · Lovenox was held 3/23  through 4/6/18.  Resolution of pain, Lovenox was resumed.  Patient currently denies significant pain in this area.  Hematoma likely occurred due to minimal trauma from transfer out of her wheelchair.  Advised to exercise significant caution in the future with mobility.  4. Right pulmonary embolism:  · Diagnosed on CT angiogram 10/21/17 involving small right lower lobe pulmonary artery.  Lower extremity Dopplers negative.  · Bilateral lower extremity Dopplers negative again 12/5/17.  · Continuing on Lovenox 1 mg/kg (80 mg) subcutaneous injection every 12 hours.  Lovenox was held for 2 weeks due to right hip hematoma seen on MRI, treatment resumed on 4/6/18 with no further obvious bleeding issues.  5. Cancer related pain:  · Previously receiving Duragesic 50 µg patch every 72 hours along with Dilaudid 4 mg as needed for breakthrough pain  · The patient's pain improved over time and she was able to discontinue both Duragesic and Dilaudid in the interval.  · Patient does take occasional Flexeril at bedtime due to back spasm/pain when she has been more active.  · Right hip pain as noted above due to right gluteal hematoma, previously prescribed Dilaudid 4 mg every 4 hours as needed #60 with no refill.  Pain from hematoma has now resolved and she is no longer requiring Dilaudid.  6. Constipation:  · Currently receiving lactulose 20 g twice a day, MiraLAX daily, Senokot 2 tablets twice daily.  Constipation is currently well controlled.  7. Traumatic left tibia/fibular fracture:  · Status post ORIF 12/6/17  · Specimen was sent for pathologic review, negative for evidence of malignancy  8. Hypercalcemia:  · Suspect hypercalcemia of malignancy, calcium in  10-11 range previously.  · Calcium normalized following initiation of monthly Xgeva on 1/23/18.  9. Chemotherapy-induced mucositis:  · Patient had a minimal degree of mucositis with cycle 2.  We will call in magic mouthwash to use as needed.  No subsequent  mucositis.  10. Recurrent UTI, bladder wall thickening on CT:  · Patient had an enterococcal UTI on 3/2/18 sensitive to nitrofurantoin and received treatment, unclear how long.  · Recurrent UTI 3/20/18 with urine culture growing Klebsiella, initially treated with nitrofurantoin, transitioned to Levaquin.  · Current CT 4/4/18 with diffuse bladder wall thickening with increased nodular thickening at the left base.  Referral to urogynecology Dr. May Johnson.  The patient has seen previously with questionable need for cystoscopy.  Recurrent UTIs likely related to urinary retention/neurogenic bladder, question need for intermittent self-catheterization.    Plan:  1. Proceed with cycle 4 Xeloda tomorrow at a dose of 2000 mg a.m., 1500 mg p.m. for 14/21 days  2. Continue Lovenox 80 mg every 12 hours.  3. Echocardiogram to evaluate pericardial effusion seen on CT  4. Ultrasound-guided biopsy of right chest wall subcutaneous nodule  5. Referral back to Dr. May Johnson in urogynecology as above  6. Additional labs today pending with TSH and free T4 due to fatigue and heat/cold sensitivity.  7. In 1 week CBC, CMP, magnesium, phosphorus and monthly Xgeva  8. In 2 weeks M.D. visit with CBC

## 2018-04-11 ENCOUNTER — TELEPHONE (OUTPATIENT)
Dept: GENERAL RADIOLOGY | Facility: HOSPITAL | Age: 58
End: 2018-04-11

## 2018-04-11 LAB
T4 FREE SERPL-MCNC: 1.11 NG/DL (ref 0.93–1.7)
TSH SERPL DL<=0.05 MIU/L-ACNC: 3.46 MIU/ML (ref 0.27–4.2)

## 2018-04-11 NOTE — TELEPHONE ENCOUNTER
----- Message from Rhianna Torres RN sent at 4/11/2018 11:09 AM EDT -----  Patient does not need a lab appointment on 4/16/18. Labs for patient's Xgeva were drawn on 4/10/18. Thanks

## 2018-04-12 ENCOUNTER — TELEPHONE (OUTPATIENT)
Dept: GENERAL RADIOLOGY | Facility: HOSPITAL | Age: 58
End: 2018-04-12

## 2018-04-12 NOTE — TELEPHONE ENCOUNTER
----- Message from Rhianna Torres RN sent at 4/12/2018  8:47 AM EDT -----  Patient scheduled for Xgeva on 4/16/18 this is too soon. Patient next Xgeva due on 4/17/18. Thanks

## 2018-04-16 ENCOUNTER — APPOINTMENT (OUTPATIENT)
Dept: ONCOLOGY | Facility: HOSPITAL | Age: 58
End: 2018-04-16

## 2018-04-16 ENCOUNTER — APPOINTMENT (OUTPATIENT)
Dept: LAB | Facility: HOSPITAL | Age: 58
End: 2018-04-16

## 2018-04-16 ENCOUNTER — HOSPITAL ENCOUNTER (OUTPATIENT)
Dept: ULTRASOUND IMAGING | Facility: HOSPITAL | Age: 58
Discharge: HOME | End: 2018-04-16
Attending: INTERNAL MEDICINE | Admitting: INTERNAL MEDICINE

## 2018-04-16 VITALS
RESPIRATION RATE: 16 BRPM | DIASTOLIC BLOOD PRESSURE: 85 MMHG | TEMPERATURE: 98.6 F | HEART RATE: 91 BPM | OXYGEN SATURATION: 98 % | HEIGHT: 62 IN | BODY MASS INDEX: 34.96 KG/M2 | WEIGHT: 190 LBS | SYSTOLIC BLOOD PRESSURE: 143 MMHG

## 2018-04-16 DIAGNOSIS — C50.811 MALIGNANT NEOPLASM OF OVERLAPPING SITES OF RIGHT BREAST IN FEMALE, ESTROGEN RECEPTOR NEGATIVE (HCC): ICD-10-CM

## 2018-04-16 DIAGNOSIS — Z17.1 MALIGNANT NEOPLASM OF OVERLAPPING SITES OF RIGHT BREAST IN FEMALE, ESTROGEN RECEPTOR NEGATIVE (HCC): ICD-10-CM

## 2018-04-16 LAB
INR PPP: 1.1 (ref 0.8–1.2)
PROTHROMBIN TIME: 13.7 SECONDS (ref 12.8–15.2)

## 2018-04-16 PROCEDURE — 88305 TISSUE EXAM BY PATHOLOGIST: CPT | Performed by: INTERNAL MEDICINE

## 2018-04-16 PROCEDURE — 88341 IMHCHEM/IMCYTCHM EA ADD ANTB: CPT | Performed by: INTERNAL MEDICINE

## 2018-04-16 PROCEDURE — 88342 IMHCHEM/IMCYTCHM 1ST ANTB: CPT | Performed by: INTERNAL MEDICINE

## 2018-04-16 PROCEDURE — 76942 ECHO GUIDE FOR BIOPSY: CPT

## 2018-04-16 RX ORDER — LIDOCAINE HYDROCHLORIDE 10 MG/ML
10 INJECTION, SOLUTION INFILTRATION; PERINEURAL ONCE
Status: COMPLETED | OUTPATIENT
Start: 2018-04-16 | End: 2018-04-16

## 2018-04-16 RX ADMIN — LIDOCAINE HYDROCHLORIDE 3 ML: 10 INJECTION, SOLUTION INFILTRATION; PERINEURAL at 13:20

## 2018-04-16 NOTE — DISCHARGE INSTRUCTIONS
EDUCATION /DISCHARGE INSTRUCTIONS  CT/US guided biopsy:  A biopsy is a procedure done to remove tissue for further analysis.  Before images are taken to locate the target area.  Images can be obtained using ultrasound, CT or MRI.  A physician will clean your skin with antiseptic soap, place a sterile towel around the site and administer a local anesthetic to numb the area.  The physician will then insert a special needle.  Sometimes images are taken of the needle after it is inserted to ensure the needle is in the correct area to be biopsied.   A sample is obtained and sent to the laboratory for study.  Occasionally the laboratory is unable to make a diagnosis from the sample and the procedure may need to be repeated.  Within a week the radiologist will send a report to your physician.  A pathologist will also examine the tissue and send a report.      Risks of the procedure include but are not limited to:   *  Bleeding    *  Infection   *  Puncture of surrounding organs *  Death     *  Lung collapse if the biopsy is near the chest which may require insertion of a       tube to re-inflate the lung if severe.      Benefits of the procedure:  Using x-ray helps to locate the area that requires a biopsy. The procedure is less invasive than a surgical procedure, there are no large incisions and it does not require anesthesia.      Alternatives to the procedure:  A biopsy can be performed surgically.  Risks of a surgical biopsy include exposure to anesthesia, infection, excessive bleeding and injury to abdominal organs.  A benefit of surgical biopsy is the ability to see the area to be biopsied and remove of a larger piece of tissue.    THIS EDUCATION INFORMATION WAS REVIEWED PRIOR TO PROCEDURE AND CONSENT. Patient initials__________________Time___1243________________    Post Procedure:    *  Expect the biopsy site may be tender up to one week.    *  Rest today (no pushing pulling or straining).   *  Slowly increase  activity tomorrow.    *  If you received sedation do not drive for 24 hours.   *  Keep dressing clean and dry.   *  Leave dressing on puncture site for 24 hours.    *  You may shower when dressing removed.    Call your doctor if experiencing:   *  Signs of infection such as redness, swelling, excessive pain and / or foul        smelling drainage from the puncture site.   *  Chills or fever over 101 degrees (by mouth).   *  Unrelieved pain.   *  Any new or severe symptoms.   *  If experiencing sudden / severe shortness of breath or chest pain go to the       nearest emergency room.     Following the procedure:     Follow-up with the ordering physician as directed.    Continue to take other medications as directed by your physician unless    otherwise instructed.   If applicable, resume taking your blood thinners or Aspirin on _Resume Lovenox 24 hours from the biopsy - 4/17/18 the evening dose_.       If you have any concerns please call the Radiology Nurses Desk at [phone].  You are the most important factor in your recovery.  Follow the above instructions carefully.

## 2018-04-16 NOTE — NURSING NOTE
Patient up to bathroom by W/C with 's assistance.  No problems or concerns noted.  Patient dressed and D/C instructions given to patient and .  They voice understanding and are able to teach back D/C instructions.

## 2018-04-16 NOTE — H&P (VIEW-ONLY)
Subjective .     REASONS FOR FOLLOWUP:    1. Stage Ib ([id number]) right breast cancer: Diagnosed May 2010 with excisional biopsy for invasive ductal carcinoma, 1.3 cm, grade 2, ER 90%, IL 80%, HER-2/peter negative (1+ IHC).  Subsequent right mastectomy in July 2010 with no residual breast malignancy, 1/5 sentinel lymph node with micrometastasis (0.25 mm).  Treated in the [facility] system with adjuvant AC ×4 cycles in 2010 (no taxanes administered due to underlying Charcot-Saloni-Tooth with peripheral neuropathy).  Adjuvant Femara (postmenopausal) initiated October 2010 with plan to treat ×10 years.  Genetic testing reportedly negative.  Developed osteopenia treated with Prolia beginning 2/27/13.  2. Recurrent/metastatic disease identified 10/8/17 involving thoracic spine with cord compression at T6, lumbosacral involvement, sternal and right sternoclavicular involvement.  Femara discontinued.  Radiation administered (in the [facility] system) to the thoracic spine beginning 10/19/17 treating T3-T9 to a dose of 24 gray in 6 fractions.  3. Evaluation with MRI 12/8/17 showing persistent T6 cord compression with persistent neurologic compromise requiring surgical treatment 12/11/17 with T6 laminectomy/corpectomy and T3-T9 fusion.  Pathology with metastatic carcinoma of breast origin, ER negative, IL negative, HER-2/peter negative (1+ IHC).  4. Right pulmonary embolism 10/21/17 continuing on Lovenox 1 mg/kg (100 mg) every 12 hours.  No evidence of DVT.  Lovenox held due to right gluteus hematoma 3/23/18 through 4/6/18.  5. Cancer related pain previously on Duragesic 50 µg patch every 72 hours and Dilaudid 4 mg as needed for breakthrough pain.  Narcotics subsequently discontinued with improvement in pain in January 2018.  6. Radiation therapy to L3 dural metastasis and left humerus initiated 1/15/18 (10 fractions), completed 1/26/18  7. Monthly Xgeva initiated 1/23/18  8. Mild hypercalcemia of malignancy  9. Initiation of  palliative oral single agent Xeloda 2/7/18 (2000 mg a.m., 1500 mg p.m. for 14/21 days).    HISTORY OF PRESENT ILLNESS:  The patient is a 57 y.o. year old female who is here for follow-up with the above-mentioned history.    History of Present Illness  The patient returns today in follow-up due to begin her 4th cycle of palliative Xeloda chemotherapy and continuing on monthly Xgeva last received on 3/20/18 with CT scan, bone scan, and laboratory studies to review.  Interval, she did undergo MRI of the right hip 3/22/18 which identified the etiology of her pain to be a hematoma in the right gluteal region measuring 5.7 x 4.3 x 11 cm.  Lovenox was held for 2 weeks from 3/23/18 through 4/6/18.  In the interval, the patient notes that her right hip pain has essentially resolved.  She has continued to tolerate oral Xeloda extremely well.  She denies any mucositis.  She denies any diarrhea.  She does note a normal appetite.  She is no longer undergoing home physical therapy for the past 2 weeks as she has been accepted in the program at [facility] however has not yet received her initial appointment there.  She recently received another course of antibiotics for UTI a few weeks ago.  She has continued to experience hair loss related to chemotherapy and this is quite disconcerting for her.  She does note increased sensitivity to both heat and cold.    Past Medical History:   Diagnosis Date   • Acute pulmonary embolism, cancer-related 10/2017   • Allergic rhinitis    • Arthritis     Right knee; left shoulder   • Cancer 05/2010    Right breast   • Cancer 10/2017    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   • Charcot-Saloni-Tooth disease    • CPAP (continuous positive airway pressure) dependence    • GERD (gastroesophageal reflux disease)    • H/O Colon polyps    • H/O Uterine fibroid    • Heart murmur    • Hyperlipidemia    • Hypertension    • PONV (postoperative nausea and vomiting)      Past Surgical History:    Procedure Laterality Date   • BLADDER SURGERY      Bladder lift   • BREAST RECONSTRUCTION, BREAST TISSUE EXPANDER INSERTION Right 2010   • BREAST SURGERY Right 2010    Mastectomy   •  SECTION  ,    • CHOLECYSTECTOMY     • COLONOSCOPY     • HERNIA REPAIR  2015    [facility]   • HYSTERECTOMY      Partial   • KNEE SURGERY Right    • LEG SURGERY Left     Broken   • MASTECTOMY Right    • VT TREAT TIBIAL SHAFT FX, INTRAMED IMPLANT Left 2017    Procedure: TIBIA INTRAMEDULLARY NAIL/DON INSERTION;  Surgeon: Romero Shanks MD;  Location: [facility];  Service: Orthopedics   • THORACIC DECOMPRESSION POSTERIOR FUSION WITH INSTRUMENTATION N/A 2017    Procedure: T6 costotransversectomy and decompression with T3 to  T9 posterior spinal fusion with instrumentation with Jennifer Gonzalez and Nael Wilkes;  Surgeon: Quan Nayak MD;  Location: [facility];  Service:        ONCOLOGIC HISTORY:  (History from previous dates can be found in the separate document.)    Current Outpatient Prescriptions on File Prior to Visit   Medication Sig Dispense Refill   • acetaminophen (TYLENOL) 500 MG tablet Take 500 mg by mouth Every 6 (Six) Hours As Needed for Mild Pain .     • capecitabine (XELODA) 500 MG chemo tablet Take 4 tabs (2000 mg) in the AM then take 3 tabs (1500 mg) in the PM for 14 days on then 7 days off. 98 tablet 3   • cyclobenzaprine (FLEXERIL) 10 MG tablet Take 1 tablet by mouth 3 (Three) Times a Day As Needed for Muscle Spasms. 30 tablet 3   • DULoxetine (CYMBALTA) 30 MG capsule Take 1 capsule by mouth Daily. 30 capsule 5   • FLONASE ALLERGY RELIEF 50 MCG/ACT nasal spray 2 sprays into each nostril daily.  11   • gabapentin (NEURONTIN) 300 MG capsule Take 1 capsule by mouth 3 (Three) Times a Day. 90 capsule 2   • glycerin (ADULT) 2 g suppository rectal suppository Insert 2 g into the rectum.     • HYDROmorphone (DILAUDID) 4 MG tablet Take 1 tablet by mouth Every 4 (Four) Hours  As Needed for Moderate Pain . 60 tablet 0   • lactulose (CHRONULAC) 10 GM/15ML solution Take 30 mL by mouth 2 (Two) Times a Day. 1892 mL 2   • mesalamine (LIALDA) 1.2 g EC tablet Take 1 tablet by mouth 2 (Two) Times a Day. 180 tablet 1   • minoxidil (ROGAINE) 2 % external solution Apply  topically Daily. 120 mL 1   • nitrofurantoin, macrocrystal-monohydrate, (MACROBID) 100 MG capsule Take 1 capsule by mouth Every 12 (Twelve) Hours. 20 capsule 0   • nystatin (MYCOSTATIN) 351080 UNIT/GM ointment Apply  topically 2 (Two) Times a Day.     • nystatin (nystatin) 314645 UNIT/GM powder Apply  topically 4 (Four) Times a Day.     • omeprazole (PriLOSEC) 40 MG capsule TAKE 1 CAPSULE DAILY 90 capsule 2   • ondansetron ODT (ZOFRAN-ODT) 8 MG disintegrating tablet Take 1 tablet by mouth Every 8 (Eight) Hours As Needed for Nausea or Vomiting. 30 tablet 1   • phenazopyridine (PYRIDIUM) 200 MG tablet Take 1 tablet by mouth 3 (Three) Times a Day As Needed for bladder spasms. 15 tablet 2   • polyethylene glycol (MIRALAX) packet Take 17 g by mouth Daily.     • psyllium (METAMUCIL) 58.6 % packet Take 1 packet by mouth Daily.     • saccharomyces boulardii (FLORASTOR) 250 MG capsule Take 250 mg by mouth 2 (Two) Times a Day.     • sennosides-docusate sodium (SENOKOT-S) 8.6-50 MG tablet Take 2 tablets by mouth 2 (Two) Times a Day. 90 tablet 2   • traMADol (ULTRAM) 50 MG tablet Take 1 tablet by mouth Every 8 (Eight) Hours As Needed for Moderate Pain . 30 tablet 0   • Tuberculin PPD (APLISOL ID) Inject  into the skin.     • Unable to find SWISH AND SPIT 5 ML PO Q 2 H PRN  0     No current facility-administered medications on file prior to visit.        ALLERGIES:     Allergies   Allergen Reactions   • Hydrocodone Nausea Only   • Morphine And Related Nausea And Vomiting     Social History     Social History   • Marital status:      Spouse name: Murray   • Number of children: 3   • Years of education: College     Occupational History   •   Retired     Social History Main Topics   • Smoking status: Never Smoker   • Smokeless tobacco: Never Used   • Alcohol use Yes      Comment: social   • Drug use: No   • Sexual activity: Defer     Other Topics Concern   • Not on file     Social History Narrative   • No narrative on file     Family History   Problem Relation Age of Onset   • Heart disease Mother    • Hyperlipidemia Mother    • Hypertension Mother    • Diabetes Father    • Charcot-Saloni-Tooth disease Father    • Heart disease Father    • Hypertension Father    • Heart failure Father    • Diabetes Sister    • Heart disease Sister    • Hypertension Sister    • Heart disease Maternal Aunt    • Scoliosis Maternal Aunt    • Heart disease Maternal Uncle    • Diabetes Maternal Grandmother    • Heart disease Maternal Grandmother    • Hypertension Maternal Grandmother    • Uterine cancer Maternal Grandmother    • Heart disease Maternal Grandfather      Review of Systems   Constitutional: Positive for fatigue. Negative for activity change, appetite change, fever and unexpected weight change.   HENT: Negative for congestion, mouth sores, nosebleeds, sore throat and voice change.    Respiratory: Negative for cough, shortness of breath and wheezing.    Cardiovascular: Negative for chest pain, palpitations and leg swelling.   Gastrointestinal: Negative for abdominal distention, abdominal pain, blood in stool, constipation, diarrhea, nausea and vomiting.   Endocrine: Negative for cold intolerance and heat intolerance.   Genitourinary: Negative for difficulty urinating, dysuria, frequency and hematuria.   Musculoskeletal: Negative for arthralgias, back pain, joint swelling and myalgias.   Skin: Negative for rash.   Neurological: Positive for weakness. Negative for dizziness, syncope, light-headedness, numbness and headaches.   Hematological: Negative for adenopathy. Does not bruise/bleed easily.   Psychiatric/Behavioral: Negative for confusion and sleep disturbance.  The patient is not nervous/anxious.          Objective      Vitals:    04/10/18 1230   BP: 132/82   Pulse: 80   Resp: 16   Temp: 98.4 °F (36.9 °C)   SpO2: 99%      Current Status 4/10/2018   ECOG score 3   Pain 0/10    Physical Exam   Constitutional: She is oriented to person, place, and time. She appears well-developed and well-nourished.   The patient is currently seated in a wheelchair in no distress.   HENT:   Mouth/Throat: Oropharynx is clear and moist.   Eyes: Conjunctivae are normal.   Neck: No thyromegaly present.   Cardiovascular: Normal rate and regular rhythm.  Exam reveals no gallop and no friction rub.    No murmur heard.  Pulmonary/Chest: Breath sounds normal. No respiratory distress.   Abdominal: Soft. Bowel sounds are normal. She exhibits no distension. There is no tenderness.   Musculoskeletal: She exhibits edema.   Lower extremity braces in place.  Trace bilateral lower extremity edema.   Lymphadenopathy:        Head (right side): No submandibular adenopathy present.     She has no cervical adenopathy.     She has no axillary adenopathy.        Right: No inguinal and no supraclavicular adenopathy present.        Left: No inguinal and no supraclavicular adenopathy present.   Neurological: She is alert and oriented to person, place, and time. No cranial nerve deficit.   Bilateral lower extremity strength, 3+to 4 /5   Skin: Skin is warm and dry. No rash noted.   Psychiatric: She has a normal mood and affect. Her behavior is normal.       RECENT LABS:  Hematology WBC   Date Value Ref Range Status   04/10/2018 3.24 (L) 4.00 - 10.00 10*3/mm3 Final     RBC   Date Value Ref Range Status   04/10/2018 3.58 (L) 3.90 - 5.00 10*6/mm3 Final     Hemoglobin   Date Value Ref Range Status   04/10/2018 11.6 11.5 - 14.9 g/dL Final     Hematocrit   Date Value Ref Range Status   04/10/2018 36.4 34.0 - 45.0 % Final     Platelets   Date Value Ref Range Status   04/10/2018 269 150 - 375 10*3/mm3 Final        Lab Results    Component Value Date    GLUCOSE 135 (H) 04/10/2018    BUN 12 04/10/2018    CREATININE 0.66 04/10/2018    EGFRIFNONA 92 04/10/2018    EGFRIFAFRI 80 09/25/2017    BCR 18.2 04/10/2018    K 4.8 (H) 04/10/2018    CO2 28.5 04/10/2018    CALCIUM 9.3 04/10/2018    PROTENTOTREF 6.4 09/25/2017    ALBUMIN 3.80 04/10/2018    LABIL2 1.5 04/10/2018    AST 18 04/10/2018    ALT 11 04/10/2018     MRI right hip 3/22/18:  IMPRESSION:  Heterogeneous signal collection of material within the right  gluteus medius muscle is new in comparison to abdomen CT 01/30/2018 and  has signal characteristics and an appearance most typical for  intramuscular hematoma. This measures 5.7 x 4.3 x 11 cm. There is some  adjacent muscular edema and enhancement.     There are small foci of osseous metastasis in the pelvis and in the  lower lumbar spine. Those in the lower lumbar spine are not appreciably  changed in comparison to lumbar MRI performed 12/07/2017.    CT chest 4/4/18: I did personally review CT images in the computer today and discussed the findings with Dr Andrade in radiology.  CONCLUSION:  1.  Stable appearance of the thoracic spine, there is a fixator again  noted in position. Sternal metastasis similar to the previous  examination.   2.  Subcutaneous nodule has developed in the right chest wall laterally.  3.  Small pericardial effusion now demonstrated, no pericardial nodule.  4.  Right mastectomy site is stable and satisfactory in appearance.  5.  Cluster of noncalcified pulmonary nodules within the right lower  lobe stable.  6.  Hiatal hernia.    CT abdomen and pelvis 4/4/18:  CONCLUSION:  1. Supraumbilical ventral hernia.  2. Diverticulosis.  3. Diffuse bladder wall thickening again demonstrated with increased  nodular thickening at the left base, this could be  infectious/inflammatory or neoplastic and cystoscopy if further  evaluation is warranted. Kidneys are unremarkable, no uroepithelial  abnormality of either ureter seen at  this time.  4. Unchanged right iliac bone lesion.    Bone scan 4/4/18:  IMPRESSION:  No interval change.    Assessment/Plan      1. Previous Stage Ib ([id number]) right breast cancer:  · Diagnosed May 2010 with excisional biopsy for invasive ductal carcinoma, 1.3 cm, grade 2, ER 90%, OK 80%, HER-2/peter negative (1+ IHC).    · Subsequent right mastectomy in July 2010 with no residual breast malignancy, 1/5 sentinel lymph node with micrometastasis (0.25 mm).    · Treated in the [facility] system with adjuvant AC ×4 cycles in 2010 (no taxanes administered due to underlying Charcot-Saloni-Tooth with peripheral neuropathy).    · Adjuvant Femara (postmenopausal) initiated October 2010 with plan to treat ×10 years.    · Genetic testing reportedly negative.    · Developed osteopenia treated with Prolia beginning 2/27/13. Subsequently discontinued due to identification of metastatic disease.  2. Recurrent/metastatic disease identified 10/8/17:  · Disease involving thoracic spine with cord compression at T6, lumbosacral involvement, sternal and right sternoclavicular involvement.    · Femara discontinued in 10/2017.    · Radiation administered (in the [facility] system) to the thoracic spine beginning 10/19/17 treating T3-T9 to a dose of 24 gray in 6 fractions.  · Evaluation with MRI 12/8/17 showing persistent T6 cord compression with persistent neurologic compromise requiring surgical treatment 12/11/17 with T6 laminectomy/corpectomy and T3-T9 fusion.  Pathology with metastatic carcinoma of breast origin, ER negative, OK negative, HER-2/peter negative (1+ IHC).  · Additional staging evaluation 12/8/17 with no evidence of visceral metastatic disease, bone scan showing involvement of thoracic spine, sternum, left humerus, mid frontal bone.  No plane film correlate of left humerus lesion.  MRI lumbar spine with small intradural L3 metastasis.  CA 15-3 12/6/17- 17.  · Palliative radiation therapy to L3 dural metastasis and left humerus  initiated 1/15/18 (10 fractions), completed 1/26/18.  · Hypercalcemia of malignancy with calcium in the 10-11 range.  · Initiation of monthly Xgeva 1/23/18.  · Baseline CT scan 1/30/18 with no evidence of visceral involvement.  Cluster of nodular opacities in the right lower lobe suspected to be infectious or related to bronchiolitis. Bone scan 1/30/18 showed postsurgical change in the thoracic spine, stable uptake in the frontal bone, no new areas of disease.  · Initiation of palliative oral single agent Xeloda 2/7/18 2000 mg a.m., 1500 mg p.m. for 14/21 days.   · Patient returns today in follow-up regarding to begin cycle 4 palliative single agent Xeloda with CT scan and bone scan to review from 4/4/18.  Bone scan showed no change from the prior study.  CT scan showed a small pericardial effusion of unclear significance and we will evaluate this further with echocardiogram.  CT also showed a subcutaneous nodule in the right lateral chest wall.  I did contact Dr. Andrade in radiology and we will review the scan at length today.  Also reviewed the scan at length with the patient and her  in the exam room.  There was a more elongated abnormality present on the previous scan in this location, currently with a rounded nodular solid appearance.  Per radiology this may represent a sebaceous cyst but could certainly be a metastatic lesion.  Since her disease overall remained stable with the exception of this lesion I have asked her to proceed on with her fourth cycle of Xeloda tomorrow as planned.  She will be due next week for Xgeva.  In the interval, we will also pursue an ultrasound-guided biopsy of this fairly superficial chest wall lesion.  I will see her back in 2 weeks to review the results.  3. Right hip/gluteal hematoma:  · The patient had developed considerable pain in the right hip and underwent MRI 3/22/18 visit showed a 5.7 x 4.3 x 11 cm hematoma in the right gluteal region.  · Lovenox was held 3/23  through 4/6/18.  Resolution of pain, Lovenox was resumed.  Patient currently denies significant pain in this area.  Hematoma likely occurred due to minimal trauma from transfer out of her wheelchair.  Advised to exercise significant caution in the future with mobility.  4. Right pulmonary embolism:  · Diagnosed on CT angiogram 10/21/17 involving small right lower lobe pulmonary artery.  Lower extremity Dopplers negative.  · Bilateral lower extremity Dopplers negative again 12/5/17.  · Continuing on Lovenox 1 mg/kg (80 mg) subcutaneous injection every 12 hours.  Lovenox was held for 2 weeks due to right hip hematoma seen on MRI, treatment resumed on 4/6/18 with no further obvious bleeding issues.  5. Cancer related pain:  · Previously receiving Duragesic 50 µg patch every 72 hours along with Dilaudid 4 mg as needed for breakthrough pain  · The patient's pain improved over time and she was able to discontinue both Duragesic and Dilaudid in the interval.  · Patient does take occasional Flexeril at bedtime due to back spasm/pain when she has been more active.  · Right hip pain as noted above due to right gluteal hematoma, previously prescribed Dilaudid 4 mg every 4 hours as needed #60 with no refill.  Pain from hematoma has now resolved and she is no longer requiring Dilaudid.  6. Constipation:  · Currently receiving lactulose 20 g twice a day, MiraLAX daily, Senokot 2 tablets twice daily.  Constipation is currently well controlled.  7. Traumatic left tibia/fibular fracture:  · Status post ORIF 12/6/17  · Specimen was sent for pathologic review, negative for evidence of malignancy  8. Hypercalcemia:  · Suspect hypercalcemia of malignancy, calcium in  10-11 range previously.  · Calcium normalized following initiation of monthly Xgeva on 1/23/18.  9. Chemotherapy-induced mucositis:  · Patient had a minimal degree of mucositis with cycle 2.  We will call in magic mouthwash to use as needed.  No subsequent  mucositis.  10. Recurrent UTI, bladder wall thickening on CT:  · Patient had an enterococcal UTI on 3/2/18 sensitive to nitrofurantoin and received treatment, unclear how long.  · Recurrent UTI 3/20/18 with urine culture growing Klebsiella, initially treated with nitrofurantoin, transitioned to Levaquin.  · Current CT 4/4/18 with diffuse bladder wall thickening with increased nodular thickening at the left base.  Referral to urogynecology Dr. May Johnson.  The patient has seen previously with questionable need for cystoscopy.  Recurrent UTIs likely related to urinary retention/neurogenic bladder, question need for intermittent self-catheterization.    Plan:  1. Proceed with cycle 4 Xeloda tomorrow at a dose of 2000 mg a.m., 1500 mg p.m. for 14/21 days  2. Continue Lovenox 80 mg every 12 hours.  3. Echocardiogram to evaluate pericardial effusion seen on CT  4. Ultrasound-guided biopsy of right chest wall subcutaneous nodule  5. Referral back to Dr. May Johnson in urogynecology as above  6. Additional labs today pending with TSH and free T4 due to fatigue and heat/cold sensitivity.  7. In 1 week CBC, CMP, magnesium, phosphorus and monthly Xgeva  8. In 2 weeks M.D. visit with CBC

## 2018-04-17 ENCOUNTER — HOSPITAL ENCOUNTER (OUTPATIENT)
Dept: CARDIOLOGY | Facility: HOSPITAL | Age: 58
Discharge: HOME | End: 2018-04-17
Attending: INTERNAL MEDICINE | Admitting: INTERNAL MEDICINE

## 2018-04-17 ENCOUNTER — TELEPHONE (OUTPATIENT)
Dept: INTERVENTIONAL RADIOLOGY/VASCULAR | Facility: HOSPITAL | Age: 58
End: 2018-04-17

## 2018-04-17 ENCOUNTER — APPOINTMENT (OUTPATIENT)
Dept: LAB | Facility: HOSPITAL | Age: 58
End: 2018-04-17

## 2018-04-17 ENCOUNTER — INFUSION (OUTPATIENT)
Dept: ONCOLOGY | Facility: HOSPITAL | Age: 58
End: 2018-04-17

## 2018-04-17 VITALS — HEIGHT: 62 IN | WEIGHT: 190 LBS | BODY MASS INDEX: 34.96 KG/M2

## 2018-04-17 DIAGNOSIS — C50.811 MALIGNANT NEOPLASM OF OVERLAPPING SITES OF RIGHT BREAST IN FEMALE, ESTROGEN RECEPTOR NEGATIVE (HCC): ICD-10-CM

## 2018-04-17 DIAGNOSIS — Z17.1 MALIGNANT NEOPLASM OF OVERLAPPING SITES OF RIGHT BREAST IN FEMALE, ESTROGEN RECEPTOR NEGATIVE (HCC): ICD-10-CM

## 2018-04-17 DIAGNOSIS — Z17.1 MALIGNANT NEOPLASM OF OVERLAPPING SITES OF RIGHT BREAST IN FEMALE, ESTROGEN RECEPTOR NEGATIVE (HCC): Primary | ICD-10-CM

## 2018-04-17 DIAGNOSIS — C50.811 MALIGNANT NEOPLASM OF OVERLAPPING SITES OF RIGHT BREAST IN FEMALE, ESTROGEN RECEPTOR NEGATIVE (HCC): Primary | ICD-10-CM

## 2018-04-17 DIAGNOSIS — I31.39 PERICARDIAL EFFUSION: ICD-10-CM

## 2018-04-17 DIAGNOSIS — C79.51 BONE METASTASES: ICD-10-CM

## 2018-04-17 LAB
BH CV ECHO MEAS - ACS: 1.6 CM
BH CV ECHO MEAS - AI DEC SLOPE: 224.3 CM/SEC^2
BH CV ECHO MEAS - AI MAX PG: 75.5 MMHG
BH CV ECHO MEAS - AI MAX VEL: 434.4 CM/SEC
BH CV ECHO MEAS - AI P1/2T: 567.2 MSEC
BH CV ECHO MEAS - AO MAX PG (FULL): 5.8 MMHG
BH CV ECHO MEAS - AO MAX PG: 12.6 MMHG
BH CV ECHO MEAS - AO MEAN PG (FULL): 3.4 MMHG
BH CV ECHO MEAS - AO MEAN PG: 7.7 MMHG
BH CV ECHO MEAS - AO ROOT AREA (BSA CORRECTED): 1.5
BH CV ECHO MEAS - AO ROOT AREA: 6.2 CM^2
BH CV ECHO MEAS - AO ROOT DIAM: 2.8 CM
BH CV ECHO MEAS - AO V2 MAX: 177.7 CM/SEC
BH CV ECHO MEAS - AO V2 MEAN: 130 CM/SEC
BH CV ECHO MEAS - AO V2 VTI: 38.5 CM
BH CV ECHO MEAS - AVA(I,A): 1.4 CM^2
BH CV ECHO MEAS - AVA(I,D): 1.4 CM^2
BH CV ECHO MEAS - AVA(V,A): 1.4 CM^2
BH CV ECHO MEAS - AVA(V,D): 1.4 CM^2
BH CV ECHO MEAS - BSA(HAYCOCK): 2 M^2
BH CV ECHO MEAS - BSA: 1.9 M^2
BH CV ECHO MEAS - BZI_BMI: 34.8 KILOGRAMS/M^2
BH CV ECHO MEAS - BZI_METRIC_HEIGHT: 157.5 CM
BH CV ECHO MEAS - BZI_METRIC_WEIGHT: 86.2 KG
BH CV ECHO MEAS - CONTRAST EF (2CH): 63.4 ML/M^2
BH CV ECHO MEAS - CONTRAST EF 4CH: 67.3 ML/M^2
BH CV ECHO MEAS - EDV(MOD-SP2): 41 ML
BH CV ECHO MEAS - EDV(MOD-SP4): 49 ML
BH CV ECHO MEAS - EDV(TEICH): 115.8 ML
BH CV ECHO MEAS - EF(CUBED): 67.2 %
BH CV ECHO MEAS - EF(MOD-BP): 65 %
BH CV ECHO MEAS - EF(MOD-SP2): 63.4 %
BH CV ECHO MEAS - EF(MOD-SP4): 67.3 %
BH CV ECHO MEAS - EF(TEICH): 58.5 %
BH CV ECHO MEAS - ESV(MOD-SP2): 15 ML
BH CV ECHO MEAS - ESV(MOD-SP4): 16 ML
BH CV ECHO MEAS - ESV(TEICH): 48.1 ML
BH CV ECHO MEAS - FS: 31 %
BH CV ECHO MEAS - IVS/LVPW: 1
BH CV ECHO MEAS - IVSD: 1 CM
BH CV ECHO MEAS - LAT PEAK E' VEL: 6 CM/SEC
BH CV ECHO MEAS - LV DIASTOLIC VOL/BSA (35-75): 26.2 ML/M^2
BH CV ECHO MEAS - LV MASS(C)D: 179 GRAMS
BH CV ECHO MEAS - LV MASS(C)DI: 95.7 GRAMS/M^2
BH CV ECHO MEAS - LV MAX PG: 6.8 MMHG
BH CV ECHO MEAS - LV MEAN PG: 4.3 MMHG
BH CV ECHO MEAS - LV SYSTOLIC VOL/BSA (12-30): 8.6 ML/M^2
BH CV ECHO MEAS - LV V1 MAX: 130.5 CM/SEC
BH CV ECHO MEAS - LV V1 MEAN: 97.8 CM/SEC
BH CV ECHO MEAS - LV V1 VTI: 28.6 CM
BH CV ECHO MEAS - LVIDD: 5 CM
BH CV ECHO MEAS - LVIDS: 3.4 CM
BH CV ECHO MEAS - LVLD AP2: 6 CM
BH CV ECHO MEAS - LVLD AP4: 6.6 CM
BH CV ECHO MEAS - LVLS AP2: 6 CM
BH CV ECHO MEAS - LVLS AP4: 5.6 CM
BH CV ECHO MEAS - LVOT AREA (M): 1.8 CM^2
BH CV ECHO MEAS - LVOT AREA: 1.9 CM^2
BH CV ECHO MEAS - LVOT DIAM: 1.5 CM
BH CV ECHO MEAS - LVPWD: 1 CM
BH CV ECHO MEAS - MED PEAK E' VEL: 8 CM/SEC
BH CV ECHO MEAS - MV A DUR: 0.11 SEC
BH CV ECHO MEAS - MV A MAX VEL: 103.8 CM/SEC
BH CV ECHO MEAS - MV DEC SLOPE: 313.8 CM/SEC^2
BH CV ECHO MEAS - MV DEC TIME: 0.24 SEC
BH CV ECHO MEAS - MV E MAX VEL: 77.1 CM/SEC
BH CV ECHO MEAS - MV E/A: 0.74
BH CV ECHO MEAS - MV MAX PG: 4.5 MMHG
BH CV ECHO MEAS - MV MEAN PG: 2.6 MMHG
BH CV ECHO MEAS - MV P1/2T MAX VEL: 78.6 CM/SEC
BH CV ECHO MEAS - MV P1/2T: 73.3 MSEC
BH CV ECHO MEAS - MV V2 MAX: 105.7 CM/SEC
BH CV ECHO MEAS - MV V2 MEAN: 77.5 CM/SEC
BH CV ECHO MEAS - MV V2 VTI: 25.9 CM
BH CV ECHO MEAS - MVA P1/2T LCG: 2.8 CM^2
BH CV ECHO MEAS - MVA(P1/2T): 3 CM^2
BH CV ECHO MEAS - MVA(VTI): 2.1 CM^2
BH CV ECHO MEAS - PA MAX PG (FULL): 2.5 MMHG
BH CV ECHO MEAS - PA MAX PG: 5.8 MMHG
BH CV ECHO MEAS - PA V2 MAX: 120.8 CM/SEC
BH CV ECHO MEAS - PULM A REVS DUR: 0.1 SEC
BH CV ECHO MEAS - PULM A REVS VEL: 37.1 CM/SEC
BH CV ECHO MEAS - PULM DIAS VEL: 41.5 CM/SEC
BH CV ECHO MEAS - PULM S/D: 1.8
BH CV ECHO MEAS - PULM SYS VEL: 76.6 CM/SEC
BH CV ECHO MEAS - PVA(V,A): 2.3 CM^2
BH CV ECHO MEAS - PVA(V,D): 2.3 CM^2
BH CV ECHO MEAS - QP/QS: 0.85
BH CV ECHO MEAS - RAP SYSTOLE: 3 MMHG
BH CV ECHO MEAS - RV MAX PG: 3.3 MMHG
BH CV ECHO MEAS - RV MEAN PG: 1.5 MMHG
BH CV ECHO MEAS - RV V1 MAX: 90.7 CM/SEC
BH CV ECHO MEAS - RV V1 MEAN: 55.5 CM/SEC
BH CV ECHO MEAS - RV V1 VTI: 15.2 CM
BH CV ECHO MEAS - RVOT AREA: 3 CM^2
BH CV ECHO MEAS - RVOT DIAM: 2 CM
BH CV ECHO MEAS - RVSP: 32 MMHG
BH CV ECHO MEAS - SI(AO): 128.4 ML/M^2
BH CV ECHO MEAS - SI(CUBED): 43.7 ML/M^2
BH CV ECHO MEAS - SI(LVOT): 28.7 ML/M^2
BH CV ECHO MEAS - SI(MOD-SP2): 13.9 ML/M^2
BH CV ECHO MEAS - SI(MOD-SP4): 17.6 ML/M^2
BH CV ECHO MEAS - SI(TEICH): 36.2 ML/M^2
BH CV ECHO MEAS - SUP REN AO DIAM: 1.7 CM
BH CV ECHO MEAS - SV(AO): 240.1 ML
BH CV ECHO MEAS - SV(CUBED): 81.7 ML
BH CV ECHO MEAS - SV(LVOT): 53.7 ML
BH CV ECHO MEAS - SV(MOD-SP2): 26 ML
BH CV ECHO MEAS - SV(MOD-SP4): 33 ML
BH CV ECHO MEAS - SV(RVOT): 45.7 ML
BH CV ECHO MEAS - SV(TEICH): 67.7 ML
BH CV ECHO MEAS - TAPSE (>1.6): 1.9 CM2
BH CV ECHO MEAS - TR MAX VEL: 271.5 CM/SEC
BH CV ECHO MEASUREMENTS AVERAGE E/E' RATIO: 11
BH CV XLRA - RV BASE: 2.9 CM
BH CV XLRA - TDI S': 17 CM/SEC
CYTO UR: NORMAL
LAB AP CASE REPORT: NORMAL
LAB AP CLINICAL INFORMATION: NORMAL
LEFT ATRIUM VOLUME INDEX: 19 ML/M2
LV EF 2D ECHO EST: 65 %
Lab: NORMAL
PATH REPORT.FINAL DX SPEC: NORMAL
PATH REPORT.GROSS SPEC: NORMAL

## 2018-04-17 PROCEDURE — 25010000002 DENOSUMAB 120 MG/1.7ML SOLUTION: Performed by: INTERNAL MEDICINE

## 2018-04-17 PROCEDURE — 93306 TTE W/DOPPLER COMPLETE: CPT | Performed by: INTERNAL MEDICINE

## 2018-04-17 PROCEDURE — 93306 TTE W/DOPPLER COMPLETE: CPT

## 2018-04-17 PROCEDURE — 96372 THER/PROPH/DIAG INJ SC/IM: CPT | Performed by: INTERNAL MEDICINE

## 2018-04-17 RX ORDER — PHENOL 1.4 %
600 AEROSOL, SPRAY (ML) MUCOUS MEMBRANE 2 TIMES DAILY WITH MEALS
COMMUNITY

## 2018-04-17 RX ADMIN — DENOSUMAB 120 MG: 120 INJECTION SUBCUTANEOUS at 15:05

## 2018-04-23 NOTE — PROGRESS NOTES
Subjective .     REASONS FOR FOLLOWUP:    1. Stage Ib ([id number]) right breast cancer: Diagnosed May 2010 with excisional biopsy for invasive ductal carcinoma, 1.3 cm, grade 2, ER 90%, W[place] 80%, HER-2/peter negative (1+ IHC).  Subsequent right mastectomy in July 2010 with no residual breast malignancy, 1/5 sentinel lymph node with micrometastasis (0.25 mm).  Treated in the [facility] system with adjuvant AC ×4 cycles in 2010 (no taxanes administered due to underlying Charcot-Saloni-Tooth with peripheral neuropathy).  Adjuvant Femara (postmenopausal) initiated October 2010 with plan to treat ×10 years.  Genetic testing reportedly negative.  Developed osteopenia treated with Prolia beginning 2/27/13.  2. Recurrent/metastatic disease identified 10/8/17 involving thoracic spine with cord compression at T6, lumbosacral involvement, sternal and right sternoclavicular involvement.  Femara discontinued.  Radiation administered (in the [facility] system) to the thoracic spine beginning 10/19/17 treating T3-T9 to a dose of 24 gray in 6 fractions.  3. Evaluation with MRI 12/8/17 showing persistent T6 cord compression with persistent neurologic compromise requiring surgical treatment 12/11/17 with T6 laminectomy/corpectomy and T3-T9 fusion.  Pathology with metastatic carcinoma of breast origin, ER negative, W[place] negative, HER-2/peter negative (1+ IHC).  4. Right pulmonary embolism 10/21/17 continuing on Lovenox 1 mg/kg (100 mg) every 12 hours.  No evidence of DVT.  Lovenox held due to right gluteus hematoma 3/23/18 through 4/6/18.  5. Cancer related pain previously on Duragesic 50 µg patch every 72 hours and Dilaudid 4 mg as needed for breakthrough pain.  Narcotics subsequently discontinued with improvement in pain in January 2018.  6. Radiation therapy to L3 dural metastasis and left humerus initiated 1/15/18 (10 fractions), completed 1/26/18  7. Monthly Xgeva initiated 1/23/18  8. Mild hypercalcemia of malignancy  9. Initiation of  palliative oral single agent Xeloda 2/7/18 (2000 mg a.m., 1500 mg p.m. for 14/21 days).  10. 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  In reviewing prior CT scans, has been present for some time.    HISTORY OF PRESENT ILLNESS:  The patient is a 57 y.o. year old female who is here for follow-up with the above-mentioned history.    History of Present Illness  The patient returns today in follow-up continuing on her 4th cycle of palliative Xeloda chemotherapy (currently day 14) and continuing on monthly Xgeva last received on 4/17/18.  In the interval since her last visit she did undergo echocardiogram 4/17/18 and biopsy of a subcutaneous chest wall mass on 4/16/18.  She did see Dr. Johnson in urogynecology in the interval who felt that her bladder wall thickening was related to her recurrent urinary tract infections and placed her on a prophylactic dose of trimethoprim 100 mg daily.  She has not experienced any urinary symptoms recently.  She reports that her fatigue is improved.  She has not yet received word from [facility] rehabilitation she is ready to begin treatment there she remains on the waiting list.  She continues to perform exercises at home and believes that she is continuing to gain strength in her lower extremities.  Ease of transfer out of her wheelchair has been improving.  She has not experienced any significant side effects from the current cycle of Xeloda, currently at day 14.  She denies specifically any mucositis, no diarrhea, no nausea or vomiting.  Her appetite remains normal.  She is experiencing some slight discoloration of her palms but no erythema nor skin peeling in the palms nor soles of feet.  She does continue to use Eucerin cream twice a day.    Past Medical History:   Diagnosis Date   • Acute pulmonary embolism,  cancer-related 10/2017   • Allergic rhinitis    • Arthritis     Right knee; left shoulder   • Cancer 2010    Right breast   • Cancer 10/2017    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   • Charcot-Saloni-Tooth disease    • CPAP (continuous positive airway pressure) dependence    • GERD (gastroesophageal reflux disease)    • H/O Colon polyps    • H/O Uterine fibroid    • Heart murmur    • Hyperlipidemia    • Hypertension    • PONV (postoperative nausea and vomiting)      Past Surgical History:   Procedure Laterality Date   • BLADDER SURGERY      Bladder lift   • BREAST RECONSTRUCTION, BREAST TISSUE EXPANDER INSERTION Right    • BREAST SURGERY Right 2010    Mastectomy   •  SECTION  ,    • CHOLECYSTECTOMY     • COLONOSCOPY     • HERNIA REPAIR  2015    [facility]   • HYSTERECTOMY      Partial   • KNEE SURGERY Right    • LEG SURGERY Left     Broken   • MASTECTOMY Right    • SC TREAT TIBIAL SHAFT FX, INTRAMED IMPLANT Left 2017    Procedure: TIBIA INTRAMEDULLARY NAIL/DON INSERTION;  Surgeon: Romero Shanks MD;  Location: [facility];  Service: Orthopedics   • THORACIC DECOMPRESSION POSTERIOR FUSION WITH INSTRUMENTATION N/A 2017    Procedure: T6 costotransversectomy and decompression with T3 to  T9 posterior spinal fusion with [facility];  Surgeon: Quan Nayak MD;  Location: [facility];  Service:        ONCOLOGIC HISTORY:  (History from previous dates can be found in the separate document.)    Current Outpatient Prescriptions on File Prior to Visit   Medication Sig Dispense Refill   • acetaminophen (TYLENOL) 500 MG tablet Take 500 mg by mouth Every 6 (Six) Hours As Needed for Mild Pain .     • calcium carbonate (OS-CARY) 600 MG tablet Take 600 mg by mouth 2 (Two) Times a Day With Meals.     • capecitabine (XELODA) 500 MG chemo tablet Take 4 tabs (2000 mg) in the AM then take 3 tabs (1500 mg) in the PM for 14 days on then 7 days  off. 98 tablet 3   • cyclobenzaprine (FLEXERIL) 10 MG tablet Take 1 tablet by mouth 3 (Three) Times a Day As Needed for Muscle Spasms. 30 tablet 3   • DULoxetine (CYMBALTA) 30 MG capsule Take 1 capsule by mouth Daily. 30 capsule 5   • Enoxaparin Sodium (LOVENOX IJ) Inject 0.8 mL as directed Every 12 (Twelve) Hours. 0930 AM and 0930 PM     • FLONASE ALLERGY RELIEF 50 MCG/ACT nasal spray 2 sprays into each nostril daily.  11   • gabapentin (NEURONTIN) 300 MG capsule Take 1 capsule by mouth 3 (Three) Times a Day. 90 capsule 2   • glycerin (ADULT) 2 g suppository rectal suppository Insert 2 g into the rectum.     • HYDROmorphone (DILAUDID) 4 MG tablet Take 1 tablet by mouth Every 4 (Four) Hours As Needed for Moderate Pain . 60 tablet 0   • lactulose (CHRONULAC) 10 GM/15ML solution Take 30 mL by mouth 2 (Two) Times a Day. 1892 mL 2   • mesalamine (LIALDA) 1.2 g EC tablet Take 1 tablet by mouth 2 (Two) Times a Day. 180 tablet 1   • minoxidil (ROGAINE) 2 % external solution Apply  topically Daily. 120 mL 1   • nystatin (MYCOSTATIN) 650289 UNIT/GM ointment Apply  topically 2 (Two) Times a Day.     • nystatin (nystatin) 201791 UNIT/GM powder Apply  topically 4 (Four) Times a Day.     • omeprazole (PriLOSEC) 40 MG capsule TAKE 1 CAPSULE DAILY 90 capsule 2   • ondansetron ODT (ZOFRAN-ODT) 8 MG disintegrating tablet Take 1 tablet by mouth Every 8 (Eight) Hours As Needed for Nausea or Vomiting. 30 tablet 1   • phenazopyridine (PYRIDIUM) 200 MG tablet Take 1 tablet by mouth 3 (Three) Times a Day As Needed for bladder spasms. 15 tablet 2   • polyethylene glycol (MIRALAX) packet Take 17 g by mouth Daily.     • psyllium (METAMUCIL) 58.6 % packet Take 1 packet by mouth Daily.     • saccharomyces boulardii (FLORASTOR) 250 MG capsule Take 250 mg by mouth 2 (Two) Times a Day.     • sennosides-docusate sodium (SENOKOT-S) 8.6-50 MG tablet Take 2 tablets by mouth 2 (Two) Times a Day. 90 tablet 2   • traMADol (ULTRAM) 50 MG tablet Take 1  tablet by mouth Every 8 (Eight) Hours As Needed for Moderate Pain . 30 tablet 0   • Tuberculin PPD (APLISOL ID) Inject  into the skin.     • Unable to find SWISH AND SPIT 5 ML PO Q 2 H PRN  0     No current facility-administered medications on file prior to visit.        ALLERGIES:     Allergies   Allergen Reactions   • Hydrocodone Nausea Only   • Morphine And Related Nausea And Vomiting     Social History     Social History   • Marital status:      Spouse name: Murray   • Number of children: 3   • Years of education: College     Occupational History   •  Retired     Social History Main Topics   • Smoking status: Never Smoker   • Smokeless tobacco: Never Used   • Alcohol use Yes      Comment: social   • Drug use: No   • Sexual activity: Defer     Other Topics Concern   • Not on file     Social History Narrative   • No narrative on file     Family History   Problem Relation Age of Onset   • Heart disease Mother    • Hyperlipidemia Mother    • Hypertension Mother    • Diabetes Father    • Charcot-Saloni-Tooth disease Father    • Heart disease Father    • Hypertension Father    • Heart failure Father    • Diabetes Sister    • Heart disease Sister    • Hypertension Sister    • Heart disease Maternal Aunt    • Scoliosis Maternal Aunt    • Heart disease Maternal Uncle    • Diabetes Maternal Grandmother    • Heart disease Maternal Grandmother    • Hypertension Maternal Grandmother    • Uterine cancer Maternal Grandmother    • Heart disease Maternal Grandfather      Review of Systems   Constitutional: Positive for fatigue. Negative for activity change, appetite change, fever and unexpected weight change.   HENT: Negative for congestion, mouth sores, nosebleeds, sore throat and voice change.    Respiratory: Negative for cough, shortness of breath and wheezing.    Cardiovascular: Negative for chest pain, palpitations and leg swelling.   Gastrointestinal: Negative for abdominal distention, abdominal pain, blood in  stool, constipation, diarrhea, nausea and vomiting.   Endocrine: Negative for cold intolerance and heat intolerance.   Genitourinary: Negative for difficulty urinating, dysuria, frequency and hematuria.   Musculoskeletal: Negative for arthralgias, back pain, joint swelling and myalgias.   Skin: Negative for rash.   Neurological: Positive for weakness. Negative for dizziness, syncope, light-headedness, numbness and headaches.   Hematological: Negative for adenopathy. Does not bruise/bleed easily.   Psychiatric/Behavioral: Negative for confusion and sleep disturbance. The patient is not nervous/anxious.          Objective      Vitals:    04/24/18 0854   BP: 136/86   Pulse: 93   Resp: 16   Temp: 99 °F (37.2 °C)   SpO2: 96%      Current Status 4/24/2018   ECOG score 1   Pain 0/10    Physical Exam   Constitutional: She is oriented to person, place, and time. She appears well-developed and well-nourished.   The patient is currently seated in a wheelchair in no distress.   HENT:   Mouth/Throat: Oropharynx is clear and moist.   Eyes: Conjunctivae are normal.   Neck: No thyromegaly present.   Cardiovascular: Normal rate and regular rhythm.  Exam reveals no gallop and no friction rub.    No murmur heard.  Pulmonary/Chest: Breath sounds normal. No respiratory distress.   Abdominal: Soft. Bowel sounds are normal. She exhibits no distension. There is no tenderness.   Musculoskeletal: She exhibits edema.   Lower extremity braces in place.  Trace bilateral lower extremity edema.   Lymphadenopathy:        Head (right side): No submandibular adenopathy present.     She has no cervical adenopathy.     She has no axillary adenopathy.        Right: No inguinal and no supraclavicular adenopathy present.        Left: No inguinal and no supraclavicular adenopathy present.   Neurological: She is alert and oriented to person, place, and time. No cranial nerve deficit.   Bilateral lower extremity strength, 3+to 4 /5   Skin: Skin is warm and  dry. No rash noted.   Slight brownish discoloration the palms of hands, no erythema nor skin peeling.   Psychiatric: She has a normal mood and affect. Her behavior is normal.       RECENT LABS:  Hematology WBC   Date Value Ref Range Status   04/24/2018 3.93 (L) 4.00 - 10.00 10*3/mm3 Final     RBC   Date Value Ref Range Status   04/24/2018 3.69 (L) 3.90 - 5.00 10*6/mm3 Final     Hemoglobin   Date Value Ref Range Status   04/24/2018 12.1 11.5 - 14.9 g/dL Final     Hematocrit   Date Value Ref Range Status   04/24/2018 36.1 34.0 - 45.0 % Final     Platelets   Date Value Ref Range Status   04/24/2018 273 150 - 375 10*3/mm3 Final        Lab Results   Component Value Date    GLUCOSE 135 (H) 04/10/2018    BUN 12 04/10/2018    CREATININE 0.66 04/10/2018    EGFRIFNONA 92 04/10/2018    EGFRIFAFRI 80 09/25/2017    BCR 18.2 04/10/2018    K 4.8 (H) 04/10/2018    CO2 28.5 04/10/2018    CALCIUM 9.3 04/10/2018    PROTENTOTREF 6.4 09/25/2017    ALBUMIN 3.80 04/10/2018    LABIL2 1.5 04/10/2018    AST 18 04/10/2018    ALT 11 04/10/2018     Echocardiogram 4/17/18:  · Calculated right ventricular systolic pressure from tricuspid regurgitation is 32 mmHg.  · Left ventricular systolic function is normal. Estimated EF = 65%.  There was no evidence of pericardial effusion    Ultrasound guided biopsy of right subcutaneous chest wall nodule 4/16/18:  Final Diagnosis   1. RIGHT POSTERIOR CHEST WALL, NEEDLE BIOPSIES:            LOW GRADE SPINDLE CELL NEOPLASM (SEE COMMENT).     COMMENT: Immunostains with appropriate controls are negative for AE1/3, WT-1, MART-1, GLORIA-3, showed scattered positive cells with calretinin, and are positive for CD34 and S-100. This immunoprofile raises the possibility of a nerve sheath tumor (i.e. Neurofibroma or Schwannoma). Other tumor types are difficult to exclude with certainty. Definitive additional classification may be possible following excision. A breast cancer marker is negative.         Assessment/Plan       1. Previous Stage Ib ([id number]) right breast cancer:  · Diagnosed May 2010 with excisional biopsy for invasive ductal carcinoma, 1.3 cm, grade 2, ER 90%, [place] 80%, HER-2/peter negative (1+ IHC).    · Subsequent right mastectomy in July 2010 with no residual breast malignancy, 1/5 sentinel lymph node with micrometastasis (0.25 mm).    · Treated in the [facility] system with adjuvant AC ×4 cycles in 2010 (no taxanes administered due to underlying Charcot-Saloni-Tooth with peripheral neuropathy).    · Adjuvant Femara (postmenopausal) initiated October 2010 with plan to treat ×10 years.    · Genetic testing reportedly negative.    · Developed osteopenia treated with Prolia beginning 2/27/13. Subsequently discontinued due to identification of metastatic disease.  2. Recurrent/metastatic disease identified 10/8/17:  · Disease involving thoracic spine with cord compression at T6, lumbosacral involvement, sternal and right sternoclavicular involvement.    · Femara discontinued in 10/2017.    · Radiation administered (in the [facility] system) to the thoracic spine beginning 10/19/17 treating T3-T9 to a dose of 24 gray in 6 fractions.  · Evaluation with MRI 12/8/17 showing persistent T6 cord compression with persistent neurologic compromise requiring surgical treatment 12/11/17 with T6 laminectomy/corpectomy and T3-T9 fusion.  Pathology with metastatic carcinoma of breast origin, ER negative, [place] negative, HER-2/peter negative (1+ IHC).  · Additional staging evaluation 12/8/17 with no evidence of visceral metastatic disease, bone scan showing involvement of thoracic spine, sternum, left humerus, mid frontal bone.  No plane film correlate of left humerus lesion.  MRI lumbar spine with small intradural L3 metastasis.  CA 15-3 12/6/17- 17.  · Palliative radiation therapy to L3 dural metastasis and left humerus initiated 1/15/18 (10 fractions), completed 1/26/18.  · Hypercalcemia of malignancy with calcium in the 10-11  range.  · Initiation of monthly Xgeva 1/23/18.  · Baseline CT scan 1/30/18 with no evidence of visceral involvement.  Cluster of nodular opacities in the right lower lobe suspected to be infectious or related to bronchiolitis. Bone scan 1/30/18 showed postsurgical change in the thoracic spine, stable uptake in the frontal bone, no new areas of disease.  · Initiation of palliative oral single agent Xeloda 2/7/18 2000 mg a.m., 1500 mg p.m. for 14/21 days.   · Following 3 cycles xeloda, bone scan 4/4/18 showed no change from the prior study.  CT scan 4/4/18 showed a small pericardial effusion of unclear significance as well as a subcutaneous nodule in the right lateral chest wall.  Subsequent evaluation with echocardiogram 4/17/18 showed no evidence of pericardial effusion.  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  I did review the echocardiogram and ultrasound-guided biopsy findings with the patient and her  today.  We discussed the possibility of surgical excision of the right subcutaneous chest wall lesion for more definitive diagnosis.  I have reviewed previous CT images in the computer dating back to 12/8/17 and the lesion was present even at that time measuring around 1.7 cm although not commented on in the radiology report.  As this appears to be an indolent low-grade process unrelated to her breast cancer, I am in favor of foregoing surgical excision at this time and monitoring this area on future scans.  The patient and her  agree.  She is now on cycle 4 day 14 of oral Xeloda.  She will return in 1 week for nurse practitioner visit and begin cycle 5.  She continues to tolerate Xeloda extremely well.  She will receive monthly Xgeva in 3 weeks and I will see her back in 4 weeks when she is due to start cycle 6.  We will plan repeat CT and bone scan at  the end of cycle 6.  3. Right hip/gluteal hematoma:  · The patient had developed considerable pain in the right hip and underwent MRI 3/22/18 visit showed a 5.7 x 4.3 x 11 cm hematoma in the right gluteal region.  · Lovenox was held 3/23 through 4/6/18.  Resolution of pain, Lovenox was resumed.  Patient currently denies significant pain in this area.  Hematoma likely occurred due to minimal trauma from transfer out of her wheelchair.  Advised to exercise significant caution in the future with mobility.  4. Right pulmonary embolism:  · Diagnosed on CT angiogram 10/21/17 involving small right lower lobe pulmonary artery.  Lower extremity Dopplers negative.  · Bilateral lower extremity Dopplers negative again 12/5/17.  · Continuing on Lovenox 1 mg/kg (80 mg) subcutaneous injection every 12 hours.  Lovenox was held for 2 weeks due to right hip hematoma seen on MRI, treatment resumed on 4/6/18 with no further obvious bleeding issues.  5. Cancer related pain:  · Previously receiving Duragesic 50 µg patch every 72 hours along with Dilaudid 4 mg as needed for breakthrough pain  · The patient's pain improved over time and she was able to discontinue both Duragesic and Dilaudid in the interval.  · Patient does take occasional Flexeril at bedtime due to back spasm/pain when she has been more active.  · Right hip pain as noted above due to right gluteal hematoma, previously prescribed Dilaudid 4 mg every 4 hours as needed #60 with no refill.  Pain from hematoma has now resolved and she is no longer requiring Dilaudid.  6. Constipation:  · Currently receiving lactulose 20 g twice a day, MiraLAX daily, Senokot 2 tablets twice daily.  Constipation is currently well controlled.  7. Traumatic left tibia/fibular fracture:  · Status post ORIF 12/6/17  · Specimen was sent for pathologic review, negative for evidence of malignancy  8. Hypercalcemia:  · Suspect hypercalcemia of malignancy, calcium in  10-11 range previously.  · Calcium  normalized following initiation of monthly Xgeva on 1/23/18.  9. Chemotherapy-induced mucositis:  · Patient had a minimal degree of mucositis with cycle 2.  We will call in magic mouthwash to use as needed.  No subsequent mucositis.  10. Recurrent UTI, bladder wall thickening on CT:  · Patient had an enterococcal UTI on 3/2/18 sensitive to nitrofurantoin and received treatment, unclear how long.  · Recurrent UTI 3/20/18 with urine culture growing Klebsiella, initially treated with nitrofurantoin, transitioned to Levaquin.  · CT 4/4/18 with diffuse bladder wall thickening with increased nodular thickening at the left base.  Referral to urogynecology Dr. May Johnson.  The patient reports that she was seen by Dr. Johnson in the interval and was placed on a prophylactic dose of trimethoprim 100 mg daily, bladder wall thickening felt to be related to recent recurrent urinary tract infections.  There are no plans for cystoscopy currently.  The patient remains asymptomatic in this regard.    Plan:  1. Continue with cycle 4 Xeloda (2000 mg a.m., 1500 mg p.m. for 14/21 days) currently D 14.  2. Continue Lovenox 80 mg every 12 hours.  3. Nurse practitioner visit in 1 week with CBC, CMP and begin cycle 5 Xeloda  4. In 3 weeks CBC, CMP, magnesium, phosphorus and monthly Xgeva  5. In 4 weeks M.D. visit with CBC, CMP and begin cycle 6.  Plan repeat CT and bone scan at the end of cycle 6.

## 2018-04-24 ENCOUNTER — APPOINTMENT (OUTPATIENT)
Dept: LAB | Facility: HOSPITAL | Age: 58
End: 2018-04-24

## 2018-04-24 ENCOUNTER — OFFICE VISIT (OUTPATIENT)
Dept: ONCOLOGY | Facility: CLINIC | Age: 58
End: 2018-04-24

## 2018-04-24 ENCOUNTER — LAB (OUTPATIENT)
Dept: LAB | Facility: HOSPITAL | Age: 58
End: 2018-04-24

## 2018-04-24 VITALS
HEIGHT: 61 IN | RESPIRATION RATE: 16 BRPM | OXYGEN SATURATION: 96 % | DIASTOLIC BLOOD PRESSURE: 86 MMHG | SYSTOLIC BLOOD PRESSURE: 136 MMHG | HEART RATE: 93 BPM | TEMPERATURE: 99 F

## 2018-04-24 DIAGNOSIS — C50.811 MALIGNANT NEOPLASM OF OVERLAPPING SITES OF RIGHT BREAST IN FEMALE, ESTROGEN RECEPTOR NEGATIVE (HCC): ICD-10-CM

## 2018-04-24 DIAGNOSIS — I31.39 PERICARDIAL EFFUSION: ICD-10-CM

## 2018-04-24 DIAGNOSIS — C79.51 BONE METASTASES: Primary | ICD-10-CM

## 2018-04-24 DIAGNOSIS — C79.51 BONE METASTASES: ICD-10-CM

## 2018-04-24 DIAGNOSIS — Z17.1 MALIGNANT NEOPLASM OF OVERLAPPING SITES OF RIGHT BREAST IN FEMALE, ESTROGEN RECEPTOR NEGATIVE (HCC): ICD-10-CM

## 2018-04-24 DIAGNOSIS — E83.52 HYPERCALCEMIA OF MALIGNANCY: ICD-10-CM

## 2018-04-24 DIAGNOSIS — G89.3 CANCER ASSOCIATED PAIN: ICD-10-CM

## 2018-04-24 LAB
BASOPHILS # BLD AUTO: 0.02 10*3/MM3 (ref 0–0.1)
BASOPHILS NFR BLD AUTO: 0.5 % (ref 0–1.1)
DEPRECATED RDW RBC AUTO: 71.6 FL (ref 37–49)
EOSINOPHIL # BLD AUTO: 0.2 10*3/MM3 (ref 0–0.36)
EOSINOPHIL NFR BLD AUTO: 5.1 % (ref 1–5)
ERYTHROCYTE [DISTWIDTH] IN BLOOD BY AUTOMATED COUNT: 20.3 % (ref 11.7–14.5)
HCT VFR BLD AUTO: 36.1 % (ref 34–45)
HGB BLD-MCNC: 12.1 G/DL (ref 11.5–14.9)
IMM GRANULOCYTES # BLD: 0.03 10*3/MM3 (ref 0–0.03)
IMM GRANULOCYTES NFR BLD: 0.8 % (ref 0–0.5)
LYMPHOCYTES # BLD AUTO: 0.7 10*3/MM3 (ref 1–3.5)
LYMPHOCYTES NFR BLD AUTO: 17.8 % (ref 20–49)
MCH RBC QN AUTO: 32.8 PG (ref 27–33)
MCHC RBC AUTO-ENTMCNC: 33.5 G/DL (ref 32–35)
MCV RBC AUTO: 97.8 FL (ref 83–97)
MONOCYTES # BLD AUTO: 0.34 10*3/MM3 (ref 0.25–0.8)
MONOCYTES NFR BLD AUTO: 8.7 % (ref 4–12)
NEUTROPHILS # BLD AUTO: 2.64 10*3/MM3 (ref 1.5–7)
NEUTROPHILS NFR BLD AUTO: 67.1 % (ref 39–75)
NRBC BLD MANUAL-RTO: 0 /100 WBC (ref 0–0)
PLATELET # BLD AUTO: 273 10*3/MM3 (ref 150–375)
PMV BLD AUTO: 9.8 FL (ref 8.9–12.1)
RBC # BLD AUTO: 3.69 10*6/MM3 (ref 3.9–5)
WBC NRBC COR # BLD: 3.93 10*3/MM3 (ref 4–10)

## 2018-04-24 PROCEDURE — 36416 COLLJ CAPILLARY BLOOD SPEC: CPT | Performed by: INTERNAL MEDICINE

## 2018-04-24 PROCEDURE — 85025 COMPLETE CBC W/AUTO DIFF WBC: CPT | Performed by: INTERNAL MEDICINE

## 2018-04-24 PROCEDURE — 99214 OFFICE O/P EST MOD 30 MIN: CPT | Performed by: INTERNAL MEDICINE

## 2018-04-24 RX ORDER — TRIMETHOPRIM 100 MG/1
100 TABLET ORAL DAILY
Refills: 2 | COMMUNITY
Start: 2018-04-18 | End: 2020-01-13

## 2018-04-25 ENCOUNTER — OFFICE VISIT (OUTPATIENT)
Dept: SLEEP MEDICINE | Facility: HOSPITAL | Age: 58
End: 2018-04-25
Attending: INTERNAL MEDICINE

## 2018-04-25 VITALS
SYSTOLIC BLOOD PRESSURE: 124 MMHG | HEART RATE: 104 BPM | DIASTOLIC BLOOD PRESSURE: 73 MMHG | HEIGHT: 62 IN | BODY MASS INDEX: 35.88 KG/M2 | WEIGHT: 195 LBS

## 2018-04-25 DIAGNOSIS — G47.33 OSA ON CPAP: Primary | ICD-10-CM

## 2018-04-25 DIAGNOSIS — Z99.89 OSA ON CPAP: Primary | ICD-10-CM

## 2018-04-25 PROCEDURE — G0463 HOSPITAL OUTPT CLINIC VISIT: HCPCS

## 2018-04-25 NOTE — PROGRESS NOTES
SLEEP CLINIC FOLLOW UP PROGRESS NOTE.    [facility] SLEEP MEDICINE    Martha Davey  57 y.o.  female  1960    PCP: Jazmine Chanel MD      Date of visit: 4/25/2018    INTERM HISTORY:  This is a 57 y.o. years old patient who has a history of sleep apnea and is on CPAP.  Patient reports good compliance with the device and has no problems.     The Smart card download has been reviewed and shows the following..  Compliance;100 %  > 4 hr use, 100 %  Residual AHI: 21 /hr (normal less than 5)  Patient has a large leak      PAST MEDICAL HISTORY:  · Obstructive sleep apnea  Past Medical History:   Diagnosis Date   • Acute pulmonary embolism, cancer-related 10/2017   • Allergic rhinitis    • Arthritis     Right knee; left shoulder   • Cancer 05/2010    Right breast   • Cancer 10/2017    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   • Charcot-Saloni-Tooth disease    • CPAP (continuous positive airway pressure) dependence    • GERD (gastroesophageal reflux disease)    • H/O Colon polyps    • H/O Uterine fibroid    • Heart murmur    • Hyperlipidemia    • Hypertension    • PONV (postoperative nausea and vomiting)        MEDICATIONS:    Current Outpatient Prescriptions:   •  acetaminophen (TYLENOL) 500 MG tablet, Take 500 mg by mouth Every 6 (Six) Hours As Needed for Mild Pain ., Disp: , Rfl:   •  calcium carbonate (OS-CARY) 600 MG tablet, Take 600 mg by mouth 2 (Two) Times a Day With Meals., Disp: , Rfl:   •  capecitabine (XELODA) 500 MG chemo tablet, Take 4 tabs (2000 mg) in the AM then take 3 tabs (1500 mg) in the PM for 14 days on then 7 days off., Disp: 98 tablet, Rfl: 3  •  cyclobenzaprine (FLEXERIL) 10 MG tablet, Take 1 tablet by mouth 3 (Three) Times a Day As Needed for Muscle Spasms., Disp: 30 tablet, Rfl: 3  •  DULoxetine (CYMBALTA) 30 MG capsule, Take 1 capsule by mouth Daily., Disp: 30 capsule, Rfl: 5  •  Enoxaparin Sodium (LOVENOX IJ), Inject 0.8 mL as directed Every 12 (Twelve) Hours. 0930 AM and 0930 PM,  Disp: , Rfl:   •  FLONASE ALLERGY RELIEF 50 MCG/ACT nasal spray, 2 sprays into each nostril daily., Disp: , Rfl: 11  •  gabapentin (NEURONTIN) 300 MG capsule, Take 1 capsule by mouth 3 (Three) Times a Day., Disp: 90 capsule, Rfl: 2  •  glycerin (ADULT) 2 g suppository rectal suppository, Insert 2 g into the rectum., Disp: , Rfl:   •  HYDROmorphone (DILAUDID) 4 MG tablet, Take 1 tablet by mouth Every 4 (Four) Hours As Needed for Moderate Pain ., Disp: 60 tablet, Rfl: 0  •  lactulose (CHRONULAC) 10 GM/15ML solution, Take 30 mL by mouth 2 (Two) Times a Day., Disp: 1892 mL, Rfl: 2  •  mesalamine (LIALDA) 1.2 g EC tablet, Take 1 tablet by mouth 2 (Two) Times a Day., Disp: 180 tablet, Rfl: 1  •  minoxidil (ROGAINE) 2 % external solution, Apply  topically Daily., Disp: 120 mL, Rfl: 1  •  nystatin (MYCOSTATIN) 785908 UNIT/GM ointment, Apply  topically 2 (Two) Times a Day., Disp: , Rfl:   •  nystatin (nystatin) 778218 UNIT/GM powder, Apply  topically 4 (Four) Times a Day., Disp: , Rfl:   •  omeprazole (PriLOSEC) 40 MG capsule, TAKE 1 CAPSULE DAILY, Disp: 90 capsule, Rfl: 2  •  ondansetron ODT (ZOFRAN-ODT) 8 MG disintegrating tablet, Take 1 tablet by mouth Every 8 (Eight) Hours As Needed for Nausea or Vomiting., Disp: 30 tablet, Rfl: 1  •  phenazopyridine (PYRIDIUM) 200 MG tablet, Take 1 tablet by mouth 3 (Three) Times a Day As Needed for bladder spasms., Disp: 15 tablet, Rfl: 2  •  polyethylene glycol (MIRALAX) packet, Take 17 g by mouth Daily., Disp: , Rfl:   •  psyllium (METAMUCIL) 58.6 % packet, Take 1 packet by mouth Daily., Disp: , Rfl:   •  saccharomyces boulardii (FLORASTOR) 250 MG capsule, Take 250 mg by mouth 2 (Two) Times a Day., Disp: , Rfl:   •  sennosides-docusate sodium (SENOKOT-S) 8.6-50 MG tablet, Take 2 tablets by mouth 2 (Two) Times a Day., Disp: 90 tablet, Rfl: 2  •  traMADol (ULTRAM) 50 MG tablet, Take 1 tablet by mouth Every 8 (Eight) Hours As Needed for Moderate Pain ., Disp: 30 tablet, Rfl: 0  •   "trimethoprim (TRIMPEX) 100 MG tablet, Take 100 mg by mouth Daily., Disp: , Rfl: 2  •  Tuberculin PPD (APLISOL ID), Inject  into the skin., Disp: , Rfl:   •  Unable to find, SWISH AND SPIT 5 ML PO Q 2 H PRN, Disp: , Rfl: 0    Allergies   Allergen Reactions   • Hydrocodone Nausea Only   • Morphine And Related Nausea And Vomiting       SOCIAL, FAMILY HISTORY: Medical records are reviewed and noted.    REVIEW OF SYSTEMS:   [place] Sleepiness Scale :Total score: 5   Snoring no  Feels refreshed after waking up: yes  Morning headaches no  Nasal congestion no  Leg movements no    PHYSICAL EXAMINATION:  Vitals:    04/25/18 1300   BP: 124/73   Pulse: 104   Weight: 88.5 kg (195 lb)   Height: 157.5 cm (62\")    Body mass index is 35.67 kg/m².    HEENT: Unremarkable, pupils are round equal and reacting to light, nasal passage is clear   NECK: No lymphadenopathy, throat is clear, oral airway Mallampati class III  RESPRATORY SYSTEM: Breath sounds are equal on both sides and are normal, no wheezes or crackles  CARDIOVASULAR SYSTEM: Heart rate is regular without murmur  ABDOMEN: Soft, no ascites, no hepatosplenomegaly.  EXTREMITIES: No cyanosis, clubbing or edema       ASSESSMENT AND PLAN:  · Obstructive sleep apnea, patient is using the CPAP with good compliance for treatment of sleep apnea.  I have reviewed the smart card down load and discussed with the patient the download data and encouarged the patient to continue to use the CPAP. The residual AHI is acceptable.  The device is benefiting the patient.  The patient is also instructed to get the CPAP supplies from the [employer] and see me in 1 year for follow-up.  The DME company is [employer].  She has a large leak which may be accounting as AHI.  I have ordered a Ledy view full facemask.  After using the new mask will get a download in about 4 weeks and check further leak.  Otherwise I'll see her in 1 year for follow-up  · Obesity, I have discussed weight reduction and the " health benefits.  I have also discuss the relationship between the weight and the sleep apnea and encouraged the patient to lose weight  · History of breast cancer      Alber Orozco MD, [facility]  Pulmonary, Critical Care and sleep Medicine       Addendum  6/6/2018     The Smart card download has been reviewed.  The compliance is 100% on more than 4 was usage is 80% and residual AHI is 16 point 2 on auto CPAP between 10 and 15.  The average leak is about 21 L.    Patient still has slightly higher AHI and some going to change the pressure on the auto CPAP to 12 to 18 cm      Alber Orozco MD  6/6/2018

## 2018-05-01 ENCOUNTER — LAB (OUTPATIENT)
Dept: OTHER | Facility: HOSPITAL | Age: 58
End: 2018-05-01

## 2018-05-01 ENCOUNTER — OFFICE VISIT (OUTPATIENT)
Dept: ONCOLOGY | Facility: CLINIC | Age: 58
End: 2018-05-01

## 2018-05-01 VITALS
SYSTOLIC BLOOD PRESSURE: 124 MMHG | OXYGEN SATURATION: 97 % | DIASTOLIC BLOOD PRESSURE: 76 MMHG | TEMPERATURE: 97.9 F | HEART RATE: 90 BPM | RESPIRATION RATE: 18 BRPM | HEIGHT: 61 IN

## 2018-05-01 DIAGNOSIS — E83.52 HYPERCALCEMIA OF MALIGNANCY: ICD-10-CM

## 2018-05-01 DIAGNOSIS — Z17.1 MALIGNANT NEOPLASM OF OVERLAPPING SITES OF RIGHT BREAST IN FEMALE, ESTROGEN RECEPTOR NEGATIVE (HCC): ICD-10-CM

## 2018-05-01 DIAGNOSIS — C50.811 MALIGNANT NEOPLASM OF OVERLAPPING SITES OF RIGHT BREAST IN FEMALE, ESTROGEN RECEPTOR NEGATIVE (HCC): ICD-10-CM

## 2018-05-01 DIAGNOSIS — C79.51 BONE METASTASES: Primary | ICD-10-CM

## 2018-05-01 DIAGNOSIS — G89.3 CANCER ASSOCIATED PAIN: ICD-10-CM

## 2018-05-01 DIAGNOSIS — C79.51 BONE METASTASES: ICD-10-CM

## 2018-05-01 LAB
ALBUMIN SERPL-MCNC: 3.9 G/DL (ref 3.5–5.2)
ALBUMIN/GLOB SERPL: 1.5 G/DL
ALP SERPL-CCNC: 72 U/L (ref 39–117)
ALT SERPL W P-5'-P-CCNC: 12 U/L (ref 1–33)
ANION GAP SERPL CALCULATED.3IONS-SCNC: 9.5 MMOL/L
AST SERPL-CCNC: 17 U/L (ref 1–32)
BASOPHILS # BLD AUTO: 0.04 10*3/MM3 (ref 0–0.2)
BASOPHILS NFR BLD AUTO: 1.3 % (ref 0–1.5)
BILIRUB SERPL-MCNC: 0.3 MG/DL (ref 0.1–1.2)
BUN BLD-MCNC: 18 MG/DL (ref 6–20)
BUN/CREAT SERPL: 22 (ref 7–25)
CALCIUM SPEC-SCNC: 9.1 MG/DL (ref 8.6–10.5)
CHLORIDE SERPL-SCNC: 105 MMOL/L (ref 98–107)
CO2 SERPL-SCNC: 29.5 MMOL/L (ref 22–29)
CREAT BLD-MCNC: 0.82 MG/DL (ref 0.57–1)
DEPRECATED RDW RBC AUTO: 75.7 FL (ref 37–54)
EOSINOPHIL # BLD AUTO: 0.14 10*3/MM3 (ref 0–0.7)
EOSINOPHIL NFR BLD AUTO: 4.6 % (ref 0.3–6.2)
ERYTHROCYTE [DISTWIDTH] IN BLOOD BY AUTOMATED COUNT: 20.6 % (ref 11.7–13)
GFR SERPL CREATININE-BSD FRML MDRD: 72 ML/MIN/1.73
GLOBULIN UR ELPH-MCNC: 2.6 GM/DL
GLUCOSE BLD-MCNC: 131 MG/DL (ref 65–99)
HCT VFR BLD AUTO: 37.1 % (ref 35.6–45.5)
HGB BLD-MCNC: 12.3 G/DL (ref 11.9–15.5)
IMM GRANULOCYTES # BLD: 0 10*3/MM3 (ref 0–0.03)
IMM GRANULOCYTES NFR BLD: 0 % (ref 0–0.5)
LYMPHOCYTES # BLD AUTO: 0.78 10*3/MM3 (ref 0.9–4.8)
LYMPHOCYTES NFR BLD AUTO: 25.4 % (ref 19.6–45.3)
MCH RBC QN AUTO: 33 PG (ref 26.9–32)
MCHC RBC AUTO-ENTMCNC: 33.2 G/DL (ref 32.4–36.3)
MCV RBC AUTO: 99.5 FL (ref 80.5–98.2)
MONOCYTES # BLD AUTO: 0.39 10*3/MM3 (ref 0.2–1.2)
MONOCYTES NFR BLD AUTO: 12.7 % (ref 5–12)
NEUTROPHILS # BLD AUTO: 1.72 10*3/MM3 (ref 1.9–8.1)
NEUTROPHILS NFR BLD AUTO: 56 % (ref 42.7–76)
NRBC BLD MANUAL-RTO: 0 /100 WBC (ref 0–0)
PLATELET # BLD AUTO: 251 10*3/MM3 (ref 140–500)
PMV BLD AUTO: 9.7 FL (ref 6–12)
POTASSIUM BLD-SCNC: 4.5 MMOL/L (ref 3.5–5.2)
PROT SERPL-MCNC: 6.5 G/DL (ref 6–8.5)
RBC # BLD AUTO: 3.73 10*6/MM3 (ref 3.9–5.2)
SODIUM BLD-SCNC: 144 MMOL/L (ref 136–145)
WBC NRBC COR # BLD: 3.07 10*3/MM3 (ref 4.5–10.7)

## 2018-05-01 PROCEDURE — 80053 COMPREHEN METABOLIC PANEL: CPT | Performed by: INTERNAL MEDICINE

## 2018-05-01 PROCEDURE — 99213 OFFICE O/P EST LOW 20 MIN: CPT | Performed by: NURSE PRACTITIONER

## 2018-05-01 PROCEDURE — 36415 COLL VENOUS BLD VENIPUNCTURE: CPT

## 2018-05-01 PROCEDURE — 85025 COMPLETE CBC W/AUTO DIFF WBC: CPT | Performed by: INTERNAL MEDICINE

## 2018-05-01 NOTE — PROGRESS NOTES
Subjective .     REASONS FOR FOLLOWUP:    1. Stage Ib ([id number]) right breast cancer: Diagnosed May 2010 with excisional biopsy for invasive ductal carcinoma, 1.3 cm, grade 2, ER 90%, KS 80%, HER-2/peter negative (1+ IHC).  Subsequent right mastectomy in July 2010 with no residual breast malignancy, 1/5 sentinel lymph node with micrometastasis (0.25 mm).  Treated in the [facility] system with adjuvant AC ×4 cycles in 2010 (no taxanes administered due to underlying Charcot-Saloni-Tooth with peripheral neuropathy).  Adjuvant Femara (postmenopausal) initiated October 2010 with plan to treat ×10 years.  Genetic testing reportedly negative.  Developed osteopenia treated with Prolia beginning 2/27/13.  2. Recurrent/metastatic disease identified 10/8/17 involving thoracic spine with cord compression at T6, lumbosacral involvement, sternal and right sternoclavicular involvement.  Femara discontinued.  Radiation administered (in the [facility] system) to the thoracic spine beginning 10/19/17 treating T3-T9 to a dose of 24 gray in 6 fractions.  3. Evaluation with MRI 12/8/17 showing persistent T6 cord compression with persistent neurologic compromise requiring surgical treatment 12/11/17 with T6 laminectomy/corpectomy and T3-T9 fusion.  Pathology with metastatic carcinoma of breast origin, ER negative, KS negative, HER-2/peter negative (1+ IHC).  4. Right pulmonary embolism 10/21/17 continuing on Lovenox 1 mg/kg (100 mg) every 12 hours.  No evidence of DVT.  Lovenox held due to right gluteus hematoma 3/23/18 through 4/6/18.  5. Cancer related pain previously on Duragesic 50 µg patch every 72 hours and Dilaudid 4 mg as needed for breakthrough pain.  Narcotics subsequently discontinued with improvement in pain in January 2018.  6. Radiation therapy to L3 dural metastasis and left humerus initiated 1/15/18 (10 fractions), completed 1/26/18  7. Monthly Xgeva initiated 1/23/18  8. Mild hypercalcemia of malignancy  9. Initiation of  palliative oral single agent Xeloda 18 (2000 mg a.m., 1500 mg p.m. for 14/21 days).  10. 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  In reviewing prior CT scans, has been present for some time.    HISTORY OF PRESENT ILLNESS:  The patient is a 57 y.o. year old female who is here for follow-up with the above-mentioned history.    History of Present Illness  The patient returns today for evaluation prior to initiation of cycle 5 of palliative Xeloda chemotherapy, due to start tomorrow.  Denies diarrhea, changes to her hands or feet, nausea, or vomiting.  She continues to remember to take the Xeloda specifically following meals.  She continues using Eucerin cream twice daily.  Denies fevers.      Past Medical History:   Diagnosis Date   • Acute pulmonary embolism, cancer-related 10/2017   • Allergic rhinitis    • Arthritis     Right knee; left shoulder   • Cancer 2010    Right breast   • Cancer 10/2017    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   • Charcot-Saloni-Tooth disease    • CPAP (continuous positive airway pressure) dependence    • GERD (gastroesophageal reflux disease)    • H/O Colon polyps    • H/O Uterine fibroid    • Heart murmur    • Hyperlipidemia    • Hypertension    • PONV (postoperative nausea and vomiting)      Past Surgical History:   Procedure Laterality Date   • BLADDER SURGERY      Bladder lift   • BREAST RECONSTRUCTION, BREAST TISSUE EXPANDER INSERTION Right    • BREAST SURGERY Right 2010    Mastectomy   •  SECTION  ,    • CHOLECYSTECTOMY     • COLONOSCOPY     • HERNIA REPAIR  2015    [facility]   • HYSTERECTOMY      Partial   • KNEE SURGERY Right    • LEG SURGERY Left     Broken   • MASTECTOMY Right    • HI TREAT TIBIAL SHAFT FX, INTRAMED IMPLANT Left 2017    Procedure: TIBIA  INTRAMEDULLARY NAIL/DON INSERTION;  Surgeon: Romero Shanks MD;  Location: [facility];  Service: Orthopedics   • THORACIC DECOMPRESSION POSTERIOR FUSION WITH INSTRUMENTATION N/A 12/11/2017    Procedure: T6 costotransversectomy and decompression with T3 to  T9 posterior spinal fusion with instrumentation with Jennifer Gonzalez and Nael Wilkes;  Surgeon: Quan Nyaak MD;  Location: [facility];  Service:        ONCOLOGIC HISTORY:  (History from previous dates can be found in the separate document.)    Current Outpatient Prescriptions on File Prior to Visit   Medication Sig Dispense Refill   • acetaminophen (TYLENOL) 500 MG tablet Take 500 mg by mouth Every 6 (Six) Hours As Needed for Mild Pain .     • calcium carbonate (OS-CARY) 600 MG tablet Take 600 mg by mouth 2 (Two) Times a Day With Meals.     • capecitabine (XELODA) 500 MG chemo tablet Take 4 tabs (2000 mg) in the AM then take 3 tabs (1500 mg) in the PM for 14 days on then 7 days off. 98 tablet 3   • cyclobenzaprine (FLEXERIL) 10 MG tablet Take 1 tablet by mouth 3 (Three) Times a Day As Needed for Muscle Spasms. 30 tablet 3   • DULoxetine (CYMBALTA) 30 MG capsule Take 1 capsule by mouth Daily. 30 capsule 5   • Enoxaparin Sodium (LOVENOX IJ) Inject 0.8 mL as directed Every 12 (Twelve) Hours. 0930 AM and 0930 PM     • FLONASE ALLERGY RELIEF 50 MCG/ACT nasal spray 2 sprays into each nostril daily.  11   • gabapentin (NEURONTIN) 300 MG capsule Take 1 capsule by mouth 3 (Three) Times a Day. 90 capsule 2   • glycerin (ADULT) 2 g suppository rectal suppository Insert 2 g into the rectum.     • HYDROmorphone (DILAUDID) 4 MG tablet Take 1 tablet by mouth Every 4 (Four) Hours As Needed for Moderate Pain . 60 tablet 0   • lactulose (CHRONULAC) 10 GM/15ML solution Take 30 mL by mouth 2 (Two) Times a Day. 1892 mL 2   • mesalamine (LIALDA) 1.2 g EC tablet Take 1 tablet by mouth 2 (Two) Times a Day. 180 tablet 1   • minoxidil (ROGAINE) 2 % external solution Apply   topically Daily. 120 mL 1   • nystatin (MYCOSTATIN) 035648 UNIT/GM ointment Apply  topically 2 (Two) Times a Day.     • nystatin (nystatin) 696018 UNIT/GM powder Apply  topically 4 (Four) Times a Day.     • omeprazole (PriLOSEC) 40 MG capsule TAKE 1 CAPSULE DAILY 90 capsule 2   • ondansetron ODT (ZOFRAN-ODT) 8 MG disintegrating tablet Take 1 tablet by mouth Every 8 (Eight) Hours As Needed for Nausea or Vomiting. 30 tablet 1   • phenazopyridine (PYRIDIUM) 200 MG tablet Take 1 tablet by mouth 3 (Three) Times a Day As Needed for bladder spasms. 15 tablet 2   • polyethylene glycol (MIRALAX) packet Take 17 g by mouth Daily.     • psyllium (METAMUCIL) 58.6 % packet Take 1 packet by mouth Daily.     • saccharomyces boulardii (FLORASTOR) 250 MG capsule Take 250 mg by mouth 2 (Two) Times a Day.     • sennosides-docusate sodium (SENOKOT-S) 8.6-50 MG tablet Take 2 tablets by mouth 2 (Two) Times a Day. 90 tablet 2   • traMADol (ULTRAM) 50 MG tablet Take 1 tablet by mouth Every 8 (Eight) Hours As Needed for Moderate Pain . 30 tablet 0   • trimethoprim (TRIMPEX) 100 MG tablet Take 100 mg by mouth Daily.  2   • Tuberculin PPD (APLISOL ID) Inject  into the skin.     • Unable to find SWISH AND SPIT 5 ML PO Q 2 H PRN  0     No current facility-administered medications on file prior to visit.        ALLERGIES:     Allergies   Allergen Reactions   • Hydrocodone Nausea Only   • Morphine And Related Nausea And Vomiting     Social History     Social History   • Marital status:      Spouse name: Murray   • Number of children: 3   • Years of education: College     Occupational History   •  Retired     Social History Main Topics   • Smoking status: Never Smoker   • Smokeless tobacco: Never Used   • Alcohol use Yes      Comment: social   • Drug use: No   • Sexual activity: Defer     Other Topics Concern   • Not on file     Social History Narrative   • No narrative on file     Family History   Problem Relation Age of Onset   • Heart  disease Mother    • Hyperlipidemia Mother    • Hypertension Mother    • Diabetes Father    • Charcot-Saloni-Tooth disease Father    • Heart disease Father    • Hypertension Father    • Heart failure Father    • Diabetes Sister    • Heart disease Sister    • Hypertension Sister    • Heart disease Maternal Aunt    • Scoliosis Maternal Aunt    • Heart disease Maternal Uncle    • Diabetes Maternal Grandmother    • Heart disease Maternal Grandmother    • Hypertension Maternal Grandmother    • Uterine cancer Maternal Grandmother    • Heart disease Maternal Grandfather      Review of Systems   Constitutional: Positive for fatigue. Negative for activity change, appetite change, fever and unexpected weight change.   HENT: Negative for congestion, mouth sores, nosebleeds, sore throat and voice change.    Respiratory: Negative for cough, shortness of breath and wheezing.    Cardiovascular: Negative for chest pain, palpitations and leg swelling.   Gastrointestinal: Negative for abdominal distention, abdominal pain, blood in stool, constipation, diarrhea, nausea and vomiting.   Endocrine: Negative for cold intolerance and heat intolerance.   Genitourinary: Negative for difficulty urinating, dysuria, frequency and hematuria.   Musculoskeletal: Negative for arthralgias, back pain, joint swelling and myalgias.   Skin: Negative for rash.   Neurological: Positive for weakness. Negative for dizziness, syncope, light-headedness, numbness and headaches.   Hematological: Negative for adenopathy. Does not bruise/bleed easily.   Psychiatric/Behavioral: Negative for confusion and sleep disturbance. The patient is not nervous/anxious.          Objective      Vitals:    05/01/18 1256   BP: 124/76   Pulse: 90   Resp: 18   Temp: 97.9 °F (36.6 °C)   SpO2: 97%      Current Status 5/1/2018   ECOG score 1   Pain 0/10    Physical Exam   Constitutional: She is oriented to person, place, and time. She appears well-developed and well-nourished.   The  patient is currently seated in a wheelchair in no distress.   HENT:   Mouth/Throat: Oropharynx is clear and moist.   Eyes: Conjunctivae are normal. Pupils are equal, round, and reactive to light.   Neck: No JVD present.   Cardiovascular: Normal rate and regular rhythm.    No murmur heard.  Pulmonary/Chest: Breath sounds normal. No respiratory distress.   Abdominal: Soft. Bowel sounds are normal. She exhibits no distension. There is no tenderness.   Musculoskeletal: She exhibits edema.   Lower extremity braces in place.  Trace bilateral lower extremity edema.   Lymphadenopathy:        Head (right side): No submandibular adenopathy present.     She has no cervical adenopathy.     She has no axillary adenopathy.        Right: No inguinal and no supraclavicular adenopathy present.        Left: No inguinal and no supraclavicular adenopathy present.   Neurological: She is alert and oriented to person, place, and time.   Skin: Skin is warm and dry. No rash noted.   Slight brownish discoloration the palms of hands, no erythema nor skin peeling.   Psychiatric: She has a normal mood and affect. Her behavior is normal.       RECENT LABS:  Hematology WBC   Date Value Ref Range Status   05/01/2018 3.07 (L) 4.50 - 10.70 10*3/mm3 Final     RBC   Date Value Ref Range Status   05/01/2018 3.73 (L) 3.90 - 5.20 10*6/mm3 Final     Hemoglobin   Date Value Ref Range Status   05/01/2018 12.3 11.9 - 15.5 g/dL Final     Hematocrit   Date Value Ref Range Status   05/01/2018 37.1 35.6 - 45.5 % Final     Platelets   Date Value Ref Range Status   05/01/2018 251 140 - 500 10*3/mm3 Final        Lab Results   Component Value Date    GLUCOSE 131 (H) 05/01/2018    BUN 18 05/01/2018    CREATININE 0.82 05/01/2018    EGFRIFNONA 72 05/01/2018    EGFRIFAFRI 80 09/25/2017    BCR 22.0 05/01/2018    K 4.5 05/01/2018    CO2 29.5 (H) 05/01/2018    CALCIUM 9.1 05/01/2018    PROTENTOTREF 6.4 09/25/2017    ALBUMIN 3.90 05/01/2018    LABIL2 1.5 05/01/2018    AST  17 05/01/2018    ALT 12 05/01/2018     Echocardiogram 4/17/18:  · Calculated right ventricular systolic pressure from tricuspid regurgitation is 32 mmHg.  · Left ventricular systolic function is normal. Estimated EF = 65%.  There was no evidence of pericardial effusion    Ultrasound guided biopsy of right subcutaneous chest wall nodule 4/16/18:  Final Diagnosis   1. RIGHT POSTERIOR CHEST WALL, NEEDLE BIOPSIES:            LOW GRADE SPINDLE CELL NEOPLASM (SEE COMMENT).     COMMENT: Immunostains with appropriate controls are negative for AE1/3, WT-1, MART-1, GLORIA-3, showed scattered positive cells with calretinin, and are positive for CD34 and S-100. This immunoprofile raises the possibility of a nerve sheath tumor (i.e. Neurofibroma or Schwannoma). Other tumor types are difficult to exclude with certainty. Definitive additional classification may be possible following excision. A breast cancer marker is negative.         Assessment/Plan      1. Previous Stage Ib ([id number]) right breast cancer:  · Diagnosed May 2010 with excisional biopsy for invasive ductal carcinoma, 1.3 cm, grade 2, ER 90%, CT 80%, HER-2/peter negative (1+ IHC).    · Subsequent right mastectomy in July 2010 with no residual breast malignancy, 1/5 sentinel lymph node with micrometastasis (0.25 mm).    · Treated in the [place] system with adjuvant AC ×4 cycles in 2010 (no taxanes administered due to underlying Charcot-Saloni-Tooth with peripheral neuropathy).    · Adjuvant Femara (postmenopausal) initiated October 2010 with plan to treat ×10 years.    · Genetic testing reportedly negative.    · Developed osteopenia treated with Prolia beginning 2/27/13. Subsequently discontinued due to identification of metastatic disease.  2. Recurrent/metastatic disease identified 10/8/17:  · Disease involving thoracic spine with cord compression at T6, lumbosacral involvement, sternal and right sternoclavicular involvement.    · Femara discontinued in 10/2017.     · Radiation administered (in the [facility] system) to the thoracic spine beginning 10/19/17 treating T3-T9 to a dose of 24 gray in 6 fractions.  · Evaluation with MRI 12/8/17 showing persistent T6 cord compression with persistent neurologic compromise requiring surgical treatment 12/11/17 with T6 laminectomy/corpectomy and T3-T9 fusion.  Pathology with metastatic carcinoma of breast origin, ER negative, IN negative, HER-2/peter negative (1+ IHC).  · Additional staging evaluation 12/8/17 with no evidence of visceral metastatic disease, bone scan showing involvement of thoracic spine, sternum, left humerus, mid frontal bone.  No plane film correlate of left humerus lesion.  MRI lumbar spine with small intradural L3 metastasis.  CA 15-3 12/6/17- 17.  · Palliative radiation therapy to L3 dural metastasis and left humerus initiated 1/15/18 (10 fractions), completed 1/26/18.  · Hypercalcemia of malignancy with calcium in the 10-11 range.  · Initiation of monthly Xgeva 1/23/18.  · Baseline CT scan 1/30/18 with no evidence of visceral involvement.  Cluster of nodular opacities in the right lower lobe suspected to be infectious or related to bronchiolitis. Bone scan 1/30/18 showed postsurgical change in the thoracic spine, stable uptake in the frontal bone, no new areas of disease.  · Initiation of palliative oral single agent Xeloda 2/7/18 2000 mg a.m., 1500 mg p.m. for 14/21 days.   · Following 3 cycles xeloda, bone scan 4/4/18 showed no change from the prior study.  CT scan 4/4/18 showed a small pericardial effusion of unclear significance as well as a subcutaneous nodule in the right lateral chest wall.  Subsequent evaluation with echocardiogram 4/17/18 showed no evidence of pericardial effusion.  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  I did review the echocardiogram and  ultrasound-guided biopsy findings with the patient and her  today.  We discussed the possibility of surgical excision of the right subcutaneous chest wall lesion for more definitive diagnosis.  I have reviewed previous CT images in the computer dating back to 12/8/17 and the lesion was present even at that time measuring around 1.7 cm although not commented on in the radiology report.  As this appears to be an indolent low-grade process unrelated to her breast cancer, I am in favor of foregoing surgical excision at this time and monitoring this area on future scans.  The patient and her  agree.    · She returns today prior to initiating cycle 5 of Xeloda tomorrow.  She feels well with no concerns.  Using Eucerin to her hands and feet has no changes other than the darkening.  We will proceed with Xeloda same dose as previous.  She will return in 2 weeks for Xgeva and in 3 weeks for review by Dr. Hancock with cycle 6 area with plan to repeat CT and bone scans following cycle 6.    3. Right hip/gluteal hematoma:  · The patient had developed considerable pain in the right hip and underwent MRI 3/22/18 visit showed a 5.7 x 4.3 x 11 cm hematoma in the right gluteal region.  · Lovenox was held 3/23 through 4/6/18.  Resolution of pain, Lovenox was resumed.  Patient currently denies significant pain in this area.  Hematoma likely occurred due to minimal trauma from transfer out of her wheelchair.  Advised to exercise significant caution in the future with mobility.  4. Right pulmonary embolism:  · Diagnosed on CT angiogram 10/21/17 involving small right lower lobe pulmonary artery.  Lower extremity Dopplers negative.  · Bilateral lower extremity Dopplers negative again 12/5/17.  · Continuing on Lovenox 1 mg/kg (80 mg) subcutaneous injection every 12 hours.  Lovenox was held for 2 weeks due to right hip hematoma seen on MRI, treatment resumed on 4/6/18 with no further obvious bleeding issues.  5. Cancer related  pain:  · Previously receiving Duragesic 50 µg patch every 72 hours along with Dilaudid 4 mg as needed for breakthrough pain  · The patient's pain improved over time and she was able to discontinue both Duragesic and Dilaudid in the interval.  · Patient does take occasional Flexeril at bedtime due to back spasm/pain when she has been more active.  · Right hip pain as noted above due to right gluteal hematoma, previously prescribed Dilaudid 4 mg every 4 hours as needed #60 with no refill.  Pain from hematoma has now resolved and she is no longer requiring Dilaudid.  6. Constipation:  · Currently receiving lactulose 20 g twice a day, MiraLAX daily, Senokot 2 tablets twice daily.  Constipation is currently well controlled.  7. Traumatic left tibia/fibular fracture:  · Status post ORIF 12/6/17  · Specimen was sent for pathologic review, negative for evidence of malignancy  8. Hypercalcemia:  · Suspect hypercalcemia of malignancy, calcium in  10-11 range previously.  · Calcium normalized following initiation of monthly Xgeva on 1/23/18.  9. Chemotherapy-induced mucositis:  · Patient had a minimal degree of mucositis with cycle 2.  We will call in magic mouthwash to use as needed.  No subsequent mucositis.  10. Recurrent UTI, bladder wall thickening on CT:  · Patient had an enterococcal UTI on 3/2/18 sensitive to nitrofurantoin and received treatment, unclear how long.  · Recurrent UTI 3/20/18 with urine culture growing Klebsiella, initially treated with nitrofurantoin, transitioned to Levaquin.  · CT 4/4/18 with diffuse bladder wall thickening with increased nodular thickening at the left base.  Referral to urogynecology Dr. May Johnson.  The patient reports that she was seen by Dr. Johnson in the interval and was placed on a prophylactic dose of trimethoprim 100 mg daily, bladder wall thickening felt to be related to recent recurrent urinary tract infections.  There are no plans for cystoscopy currently.  The patient remains  asymptomatic in this regard.    Plan:  1. Proceed with cycle 5 Xeloda (2000 mg a.m., 1500 mg p.m. for 14/21 days) due to start tomorrow.    2. Continue Lovenox 80 mg every 12 hours.  3. In 2 weeks CBC, CMP, magnesium, phosphorus and monthly Xgeva  4. In 3 weeks M.D. visit with CBC, CMP and begin cycle 6.  Plan repeat CT and bone scan at the end of cycle 6.

## 2018-05-15 ENCOUNTER — LAB (OUTPATIENT)
Dept: OTHER | Facility: HOSPITAL | Age: 58
End: 2018-05-15

## 2018-05-15 ENCOUNTER — INFUSION (OUTPATIENT)
Dept: ONCOLOGY | Facility: HOSPITAL | Age: 58
End: 2018-05-15

## 2018-05-15 VITALS
OXYGEN SATURATION: 99 % | SYSTOLIC BLOOD PRESSURE: 132 MMHG | TEMPERATURE: 98 F | DIASTOLIC BLOOD PRESSURE: 80 MMHG | HEART RATE: 88 BPM

## 2018-05-15 DIAGNOSIS — E83.52 HYPERCALCEMIA OF MALIGNANCY: ICD-10-CM

## 2018-05-15 DIAGNOSIS — Z17.1 MALIGNANT NEOPLASM OF OVERLAPPING SITES OF RIGHT BREAST IN FEMALE, ESTROGEN RECEPTOR NEGATIVE (HCC): ICD-10-CM

## 2018-05-15 DIAGNOSIS — G89.3 CANCER ASSOCIATED PAIN: ICD-10-CM

## 2018-05-15 DIAGNOSIS — C79.51 BONE METASTASES: ICD-10-CM

## 2018-05-15 DIAGNOSIS — Z17.1 MALIGNANT NEOPLASM OF OVERLAPPING SITES OF RIGHT BREAST IN FEMALE, ESTROGEN RECEPTOR NEGATIVE (HCC): Primary | ICD-10-CM

## 2018-05-15 DIAGNOSIS — C50.811 MALIGNANT NEOPLASM OF OVERLAPPING SITES OF RIGHT BREAST IN FEMALE, ESTROGEN RECEPTOR NEGATIVE (HCC): Primary | ICD-10-CM

## 2018-05-15 DIAGNOSIS — C50.811 MALIGNANT NEOPLASM OF OVERLAPPING SITES OF RIGHT BREAST IN FEMALE, ESTROGEN RECEPTOR NEGATIVE (HCC): ICD-10-CM

## 2018-05-15 LAB
ALBUMIN SERPL-MCNC: 4 G/DL (ref 3.5–5.2)
ALBUMIN/GLOB SERPL: 1.7 G/DL
ALP SERPL-CCNC: 69 U/L (ref 39–117)
ALT SERPL W P-5'-P-CCNC: 11 U/L (ref 1–33)
ANION GAP SERPL CALCULATED.3IONS-SCNC: 11 MMOL/L
AST SERPL-CCNC: 13 U/L (ref 1–32)
BASOPHILS # BLD AUTO: 0.02 10*3/MM3 (ref 0–0.2)
BASOPHILS NFR BLD AUTO: 0.6 % (ref 0–1.5)
BILIRUB SERPL-MCNC: 0.5 MG/DL (ref 0.1–1.2)
BUN BLD-MCNC: 17 MG/DL (ref 6–20)
BUN/CREAT SERPL: 23.3 (ref 7–25)
CALCIUM SPEC-SCNC: 8.5 MG/DL (ref 8.6–10.5)
CHLORIDE SERPL-SCNC: 101 MMOL/L (ref 98–107)
CO2 SERPL-SCNC: 27 MMOL/L (ref 22–29)
CREAT BLD-MCNC: 0.73 MG/DL (ref 0.57–1)
DEPRECATED RDW RBC AUTO: 70.7 FL (ref 37–54)
EOSINOPHIL # BLD AUTO: 0.1 10*3/MM3 (ref 0–0.7)
EOSINOPHIL NFR BLD AUTO: 3.2 % (ref 0.3–6.2)
ERYTHROCYTE [DISTWIDTH] IN BLOOD BY AUTOMATED COUNT: 19.5 % (ref 11.7–13)
GFR SERPL CREATININE-BSD FRML MDRD: 82 ML/MIN/1.73
GLOBULIN UR ELPH-MCNC: 2.4 GM/DL
GLUCOSE BLD-MCNC: 176 MG/DL (ref 65–99)
HCT VFR BLD AUTO: 36.2 % (ref 35.6–45.5)
HGB BLD-MCNC: 12.1 G/DL (ref 11.9–15.5)
IMM GRANULOCYTES # BLD: 0.01 10*3/MM3 (ref 0–0.03)
IMM GRANULOCYTES NFR BLD: 0.3 % (ref 0–0.5)
LYMPHOCYTES # BLD AUTO: 0.83 10*3/MM3 (ref 0.9–4.8)
LYMPHOCYTES NFR BLD AUTO: 26.6 % (ref 19.6–45.3)
MAGNESIUM SERPL-MCNC: 1.9 MG/DL (ref 1.6–2.6)
MCH RBC QN AUTO: 33.3 PG (ref 26.9–32)
MCHC RBC AUTO-ENTMCNC: 33.4 G/DL (ref 32.4–36.3)
MCV RBC AUTO: 99.7 FL (ref 80.5–98.2)
MONOCYTES # BLD AUTO: 0.29 10*3/MM3 (ref 0.2–1.2)
MONOCYTES NFR BLD AUTO: 9.3 % (ref 5–12)
NEUTROPHILS # BLD AUTO: 1.87 10*3/MM3 (ref 1.9–8.1)
NEUTROPHILS NFR BLD AUTO: 60 % (ref 42.7–76)
NRBC BLD MANUAL-RTO: 0 /100 WBC (ref 0–0)
PHOSPHATE SERPL-MCNC: 3.5 MG/DL (ref 2.5–4.5)
PLATELET # BLD AUTO: 250 10*3/MM3 (ref 140–500)
PMV BLD AUTO: 9.7 FL (ref 6–12)
POTASSIUM BLD-SCNC: 4.1 MMOL/L (ref 3.5–5.2)
PROT SERPL-MCNC: 6.4 G/DL (ref 6–8.5)
RBC # BLD AUTO: 3.63 10*6/MM3 (ref 3.9–5.2)
SODIUM BLD-SCNC: 139 MMOL/L (ref 136–145)
WBC NRBC COR # BLD: 3.12 10*3/MM3 (ref 4.5–10.7)

## 2018-05-15 PROCEDURE — 36415 COLL VENOUS BLD VENIPUNCTURE: CPT

## 2018-05-15 PROCEDURE — 84100 ASSAY OF PHOSPHORUS: CPT | Performed by: INTERNAL MEDICINE

## 2018-05-15 PROCEDURE — 25010000002 DENOSUMAB 120 MG/1.7ML SOLUTION: Performed by: INTERNAL MEDICINE

## 2018-05-15 PROCEDURE — 83735 ASSAY OF MAGNESIUM: CPT | Performed by: INTERNAL MEDICINE

## 2018-05-15 PROCEDURE — 96372 THER/PROPH/DIAG INJ SC/IM: CPT | Performed by: INTERNAL MEDICINE

## 2018-05-15 PROCEDURE — 85025 COMPLETE CBC W/AUTO DIFF WBC: CPT | Performed by: INTERNAL MEDICINE

## 2018-05-15 PROCEDURE — 80053 COMPREHEN METABOLIC PANEL: CPT | Performed by: INTERNAL MEDICINE

## 2018-05-15 RX ADMIN — DENOSUMAB 120 MG: 120 INJECTION SUBCUTANEOUS at 13:03

## 2018-05-17 ENCOUNTER — TELEPHONE (OUTPATIENT)
Dept: ONCOLOGY | Facility: CLINIC | Age: 58
End: 2018-05-17

## 2018-05-17 NOTE — TELEPHONE ENCOUNTER
Notified Cinda and kayli v/u       ----- Message from Ubaldo Hancock Jr., MD sent at 5/17/2018  1:04 PM EDT -----  yes  ----- Message -----  From: Nuzhat Rubio RN  Sent: 5/17/2018  12:29 PM  To: Ubaldo Hancock Jr., MD    [facility] know if you are ok with them doing Electrical stimulation on patient.   Thanks

## 2018-05-17 NOTE — TELEPHONE ENCOUNTER
Lee Ann sent to Dr Hancock:  [facility] called wanting to know if you are ok with them doing Electrical stimulation on patient.   Thanks    Cinda  [phone]

## 2018-05-22 ENCOUNTER — OFFICE VISIT (OUTPATIENT)
Dept: ONCOLOGY | Facility: CLINIC | Age: 58
End: 2018-05-22

## 2018-05-22 ENCOUNTER — LAB (OUTPATIENT)
Dept: LAB | Facility: HOSPITAL | Age: 58
End: 2018-05-22

## 2018-05-22 VITALS
RESPIRATION RATE: 16 BRPM | OXYGEN SATURATION: 98 % | HEIGHT: 61 IN | HEART RATE: 90 BPM | DIASTOLIC BLOOD PRESSURE: 84 MMHG | SYSTOLIC BLOOD PRESSURE: 126 MMHG | TEMPERATURE: 98.7 F

## 2018-05-22 DIAGNOSIS — Z17.1 MALIGNANT NEOPLASM OF OVERLAPPING SITES OF RIGHT BREAST IN FEMALE, ESTROGEN RECEPTOR NEGATIVE (HCC): ICD-10-CM

## 2018-05-22 DIAGNOSIS — C79.51 BONE METASTASES: ICD-10-CM

## 2018-05-22 DIAGNOSIS — C50.811 MALIGNANT NEOPLASM OF OVERLAPPING SITES OF RIGHT BREAST IN FEMALE, ESTROGEN RECEPTOR NEGATIVE (HCC): ICD-10-CM

## 2018-05-22 DIAGNOSIS — E83.52 HYPERCALCEMIA OF MALIGNANCY: ICD-10-CM

## 2018-05-22 DIAGNOSIS — G89.3 CANCER ASSOCIATED PAIN: ICD-10-CM

## 2018-05-22 DIAGNOSIS — C79.51 BONE METASTASES: Primary | ICD-10-CM

## 2018-05-22 LAB
ALBUMIN SERPL-MCNC: 4.1 G/DL (ref 3.5–5.2)
ALBUMIN/GLOB SERPL: 1.7 G/DL (ref 1.1–2.4)
ALP SERPL-CCNC: 68 U/L (ref 38–116)
ALT SERPL W P-5'-P-CCNC: 13 U/L (ref 0–33)
ANION GAP SERPL CALCULATED.3IONS-SCNC: 10.5 MMOL/L
AST SERPL-CCNC: 16 U/L (ref 0–32)
BASOPHILS # BLD AUTO: 0.03 10*3/MM3 (ref 0–0.1)
BASOPHILS NFR BLD AUTO: 0.9 % (ref 0–1.1)
BILIRUB SERPL-MCNC: 0.3 MG/DL (ref 0.1–1.2)
BUN BLD-MCNC: 19 MG/DL (ref 6–20)
BUN/CREAT SERPL: 23.5 (ref 7.3–30)
CALCIUM SPEC-SCNC: 9.5 MG/DL (ref 8.5–10.2)
CHLORIDE SERPL-SCNC: 101 MMOL/L (ref 98–107)
CO2 SERPL-SCNC: 28.5 MMOL/L (ref 22–29)
CREAT BLD-MCNC: 0.81 MG/DL (ref 0.6–1.1)
DEPRECATED RDW RBC AUTO: 72.1 FL (ref 37–49)
EOSINOPHIL # BLD AUTO: 0.14 10*3/MM3 (ref 0–0.36)
EOSINOPHIL NFR BLD AUTO: 4.1 % (ref 1–5)
ERYTHROCYTE [DISTWIDTH] IN BLOOD BY AUTOMATED COUNT: 19.5 % (ref 11.7–14.5)
GFR SERPL CREATININE-BSD FRML MDRD: 73 ML/MIN/1.73
GLOBULIN UR ELPH-MCNC: 2.4 GM/DL (ref 1.8–3.5)
GLUCOSE BLD-MCNC: 117 MG/DL (ref 74–124)
HCT VFR BLD AUTO: 37.4 % (ref 34–45)
HGB BLD-MCNC: 12.3 G/DL (ref 11.5–14.9)
IMM GRANULOCYTES # BLD: 0.02 10*3/MM3 (ref 0–0.03)
IMM GRANULOCYTES NFR BLD: 0.6 % (ref 0–0.5)
LYMPHOCYTES # BLD AUTO: 0.78 10*3/MM3 (ref 1–3.5)
LYMPHOCYTES NFR BLD AUTO: 23 % (ref 20–49)
MCH RBC QN AUTO: 33.2 PG (ref 27–33)
MCHC RBC AUTO-ENTMCNC: 32.9 G/DL (ref 32–35)
MCV RBC AUTO: 100.8 FL (ref 83–97)
MONOCYTES # BLD AUTO: 0.49 10*3/MM3 (ref 0.25–0.8)
MONOCYTES NFR BLD AUTO: 14.5 % (ref 4–12)
NEUTROPHILS # BLD AUTO: 1.93 10*3/MM3 (ref 1.5–7)
NEUTROPHILS NFR BLD AUTO: 56.9 % (ref 39–75)
NRBC BLD MANUAL-RTO: 0 /100 WBC (ref 0–0)
PLATELET # BLD AUTO: 251 10*3/MM3 (ref 150–375)
PMV BLD AUTO: 9.1 FL (ref 8.9–12.1)
POTASSIUM BLD-SCNC: 4.8 MMOL/L (ref 3.5–4.7)
PROT SERPL-MCNC: 6.5 G/DL (ref 6.3–8)
RBC # BLD AUTO: 3.71 10*6/MM3 (ref 3.9–5)
SODIUM BLD-SCNC: 140 MMOL/L (ref 134–145)
WBC NRBC COR # BLD: 3.39 10*3/MM3 (ref 4–10)

## 2018-05-22 PROCEDURE — 85025 COMPLETE CBC W/AUTO DIFF WBC: CPT | Performed by: INTERNAL MEDICINE

## 2018-05-22 PROCEDURE — 80053 COMPREHEN METABOLIC PANEL: CPT | Performed by: INTERNAL MEDICINE

## 2018-05-22 PROCEDURE — 36415 COLL VENOUS BLD VENIPUNCTURE: CPT | Performed by: INTERNAL MEDICINE

## 2018-05-22 PROCEDURE — 99214 OFFICE O/P EST MOD 30 MIN: CPT | Performed by: INTERNAL MEDICINE

## 2018-05-23 NOTE — PROGRESS NOTES
Subjective .     REASONS FOR FOLLOWUP:    1. Stage Ib ([id number]) right breast cancer: Diagnosed May 2010 with excisional biopsy for invasive ductal carcinoma, 1.3 cm, grade 2, ER 90%, AL 80%, HER-2/peter negative (1+ IHC).  Subsequent right mastectomy in July 2010 with no residual breast malignancy, 1/5 sentinel lymph node with micrometastasis (0.25 mm).  Treated in the [facility] system with adjuvant AC ×4 cycles in 2010 (no taxanes administered due to underlying Charcot-Saloni-Tooth with peripheral neuropathy).  Adjuvant Femara (postmenopausal) initiated October 2010 with plan to treat ×10 years.  Genetic testing reportedly negative.  Developed osteopenia treated with Prolia beginning 2/27/13.  2. Recurrent/metastatic disease identified 10/8/17 involving thoracic spine with cord compression at T6, lumbosacral involvement, sternal and right sternoclavicular involvement.  Femara discontinued.  Radiation administered (in the [facility] system) to the thoracic spine beginning 10/19/17 treating T3-T9 to a dose of 24 gray in 6 fractions.  3. Evaluation with MRI 12/8/17 showing persistent T6 cord compression with persistent neurologic compromise requiring surgical treatment 12/11/17 with T6 laminectomy/corpectomy and T3-T9 fusion.  Pathology with metastatic carcinoma of breast origin, ER negative, AL negative, HER-2/peter negative (1+ IHC).  4. Right pulmonary embolism 10/21/17 continuing on Lovenox 1 mg/kg (100 mg) every 12 hours.  No evidence of DVT.  Lovenox held due to right gluteus hematoma 3/23/18 through 4/6/18.  5. Cancer related pain previously on Duragesic 50 µg patch every 72 hours and Dilaudid 4 mg as needed for breakthrough pain.  Narcotics subsequently discontinued with improvement in pain in January 2018.  6. Radiation therapy to L3 dural metastasis and left humerus initiated 1/15/18 (10 fractions), completed 1/26/18  7. Monthly Xgeva initiated 1/23/18  8. Mild hypercalcemia of malignancy  9. Initiation of  palliative oral single agent Xeloda 2/7/18 (2000 mg a.m., 1500 mg p.m. for 14/21 days).  10. 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  In reviewing prior CT scans, has been present for some time.  We will monitor for now, if it enlarges consider surgical excision.    HISTORY OF PRESENT ILLNESS:  The patient is a 57 y.o. year old female who is here for follow-up with the above-mentioned history.    History of Present Illness  The patient returns today in follow-up ready to begin her 6th cycle of palliative Xeloda chemotherapy and continuing on monthly Xgeva last received on 5/15/18.  In the interval, she has tolerated treatment extremely well with no significant side effects from Xeloda.  She denies any mucositis.  She denies any diarrhea.  She has had some minor hand foot symptoms with no skin peeling or cracking and is using Eucerin cream 2-3 times per day.  She is now undergoing physical therapy at [facility] and has been improving with her mobility, able to walk longer distance with assistance.  She notes a normal appetite.  She reports some occasional back pain when she has been exercising a significant amount with therapy.  She is not requiring any narcotic pain medication at this time.    Past Medical History:   Diagnosis Date   • Acute pulmonary embolism, cancer-related 10/2017   • Allergic rhinitis    • Arthritis     Right knee; left shoulder   • Cancer 05/2010    Right breast   • Cancer 10/2017    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   • Charcot-Saloni-Tooth disease    • CPAP (continuous positive airway pressure) dependence    • GERD (gastroesophageal reflux disease)    • H/O Colon polyps    • H/O Uterine fibroid    • Heart murmur    • Hyperlipidemia    • Hypertension    • PONV (postoperative nausea and vomiting)      Past  Surgical History:   Procedure Laterality Date   • BLADDER SURGERY      Bladder lift   • BREAST RECONSTRUCTION, BREAST TISSUE EXPANDER INSERTION Right 2010   • BREAST SURGERY Right 2010    Mastectomy   •  SECTION  ,    • CHOLECYSTECTOMY     • COLONOSCOPY     • HERNIA REPAIR  2015    [facility]   • HYSTERECTOMY      Partial   • KNEE SURGERY Right    • LEG SURGERY Left     Broken   • MASTECTOMY Right 2010   • AK TREAT TIBIAL SHAFT FX, INTRAMED IMPLANT Left 2017    Procedure: TIBIA INTRAMEDULLARY NAIL/DON INSERTION;  Surgeon: Romero Shanks MD;  Location: [facility];  Service: Orthopedics   • THORACIC DECOMPRESSION POSTERIOR FUSION WITH INSTRUMENTATION N/A 2017    Procedure: T6 costotransversectomy and decompression with T3 to  T9 posterior spinal fusion with instrumentation with Jennifer Gonzalez and Nael Wilkes;  Surgeon: Quan Nayak MD;  Location: [facility];  Service:        ONCOLOGIC HISTORY:  (History from previous dates can be found in the separate document.)    Current Outpatient Prescriptions on File Prior to Visit   Medication Sig Dispense Refill   • acetaminophen (TYLENOL) 500 MG tablet Take 500 mg by mouth Every 6 (Six) Hours As Needed for Mild Pain .     • calcium carbonate (OS-CARY) 600 MG tablet Take 600 mg by mouth 2 (Two) Times a Day With Meals.     • capecitabine (XELODA) 500 MG chemo tablet Take 4 tabs (2000 mg) in the AM then take 3 tabs (1500 mg) in the PM for 14 days on then 7 days off. 98 tablet 3   • cyclobenzaprine (FLEXERIL) 10 MG tablet Take 1 tablet by mouth 3 (Three) Times a Day As Needed for Muscle Spasms. 30 tablet 3   • DULoxetine (CYMBALTA) 30 MG capsule Take 1 capsule by mouth Daily. 30 capsule 5   • FLONASE ALLERGY RELIEF 50 MCG/ACT nasal spray 2 sprays into each nostril daily.  11   • gabapentin (NEURONTIN) 300 MG capsule Take 1 capsule by mouth 3 (Three) Times a Day. 90 capsule 2   • glycerin (ADULT) 2 g suppository rectal  suppository Insert 2 g into the rectum.     • HYDROmorphone (DILAUDID) 4 MG tablet Take 1 tablet by mouth Every 4 (Four) Hours As Needed for Moderate Pain . 60 tablet 0   • lactulose (CHRONULAC) 10 GM/15ML solution Take 30 mL by mouth 2 (Two) Times a Day. 1892 mL 2   • mesalamine (LIALDA) 1.2 g EC tablet Take 1 tablet by mouth 2 (Two) Times a Day. 180 tablet 1   • minoxidil (ROGAINE) 2 % external solution Apply  topically Daily. 120 mL 1   • nystatin (MYCOSTATIN) 424007 UNIT/GM ointment Apply  topically 2 (Two) Times a Day.     • nystatin (nystatin) 703987 UNIT/GM powder Apply  topically 4 (Four) Times a Day.     • omeprazole (PriLOSEC) 40 MG capsule TAKE 1 CAPSULE DAILY 90 capsule 2   • ondansetron ODT (ZOFRAN-ODT) 8 MG disintegrating tablet Take 1 tablet by mouth Every 8 (Eight) Hours As Needed for Nausea or Vomiting. 30 tablet 1   • phenazopyridine (PYRIDIUM) 200 MG tablet Take 1 tablet by mouth 3 (Three) Times a Day As Needed for bladder spasms. 15 tablet 2   • polyethylene glycol (MIRALAX) packet Take 17 g by mouth Daily.     • psyllium (METAMUCIL) 58.6 % packet Take 1 packet by mouth Daily.     • saccharomyces boulardii (FLORASTOR) 250 MG capsule Take 250 mg by mouth 2 (Two) Times a Day.     • sennosides-docusate sodium (SENOKOT-S) 8.6-50 MG tablet Take 2 tablets by mouth 2 (Two) Times a Day. 90 tablet 2   • traMADol (ULTRAM) 50 MG tablet Take 1 tablet by mouth Every 8 (Eight) Hours As Needed for Moderate Pain . 30 tablet 0   • trimethoprim (TRIMPEX) 100 MG tablet Take 100 mg by mouth Daily.  2   • Tuberculin PPD (APLISOL ID) Inject  into the skin.     • Unable to find SWISH AND SPIT 5 ML PO Q 2 H PRN  0   • [DISCONTINUED] Enoxaparin Sodium (LOVENOX IJ) Inject 0.8 mL as directed Every 12 (Twelve) Hours. 0930 AM and 0930 PM       No current facility-administered medications on file prior to visit.        ALLERGIES:     Allergies   Allergen Reactions   • Hydrocodone Nausea Only   • Morphine And Related Nausea  And Vomiting     Social History     Social History   • Marital status:      Spouse name: Murray   • Number of children: 3   • Years of education: College     Occupational History   •  Retired     Social History Main Topics   • Smoking status: Never Smoker   • Smokeless tobacco: Never Used   • Alcohol use Yes      Comment: social   • Drug use: No   • Sexual activity: Defer     Other Topics Concern   • Not on file     Social History Narrative   • No narrative on file     Family History   Problem Relation Age of Onset   • Heart disease Mother    • Hyperlipidemia Mother    • Hypertension Mother    • Diabetes Father    • Charcot-Saloni-Tooth disease Father    • Heart disease Father    • Hypertension Father    • Heart failure Father    • Diabetes Sister    • Heart disease Sister    • Hypertension Sister    • Heart disease Maternal Aunt    • Scoliosis Maternal Aunt    • Heart disease Maternal Uncle    • Diabetes Maternal Grandmother    • Heart disease Maternal Grandmother    • Hypertension Maternal Grandmother    • Uterine cancer Maternal Grandmother    • Heart disease Maternal Grandfather      Review of Systems   Constitutional: Positive for fatigue. Negative for activity change, appetite change, fever and unexpected weight change.   HENT: Negative for congestion, mouth sores, nosebleeds, sore throat and voice change.    Respiratory: Negative for cough, shortness of breath and wheezing.    Cardiovascular: Negative for chest pain, palpitations and leg swelling.   Gastrointestinal: Negative for abdominal distention, abdominal pain, blood in stool, constipation, diarrhea, nausea and vomiting.   Endocrine: Negative for cold intolerance and heat intolerance.   Genitourinary: Negative for difficulty urinating, dysuria, frequency and hematuria.   Musculoskeletal: Positive for back pain. Negative for arthralgias, joint swelling and myalgias.   Skin: Negative for rash.   Neurological: Positive for weakness. Negative for  dizziness, syncope, light-headedness, numbness and headaches.   Hematological: Negative for adenopathy. Does not bruise/bleed easily.   Psychiatric/Behavioral: Negative for confusion and sleep disturbance. The patient is not nervous/anxious.          Objective      Vitals:    05/22/18 1145   BP: 126/84   Pulse: 90   Resp: 16   Temp: 98.7 °F (37.1 °C)   SpO2: 98%      Current Status 5/22/2018   ECOG score 2   Pain 0/10    Physical Exam   Constitutional: She is oriented to person, place, and time. She appears well-developed and well-nourished.   The patient is currently seated in a wheelchair in no distress.   HENT:   Mouth/Throat: Oropharynx is clear and moist.   Eyes: Conjunctivae are normal.   Neck: No thyromegaly present.   Cardiovascular: Normal rate and regular rhythm.  Exam reveals no gallop and no friction rub.    No murmur heard.  Pulmonary/Chest: Breath sounds normal. No respiratory distress.   Abdominal: Soft. Bowel sounds are normal. She exhibits no distension. There is no tenderness.   Musculoskeletal: She exhibits edema.   Lower extremity braces in place.  Trace bilateral lower extremity edema.   Lymphadenopathy:        Head (right side): No submandibular adenopathy present.     She has no cervical adenopathy.     She has no axillary adenopathy.        Right: No inguinal and no supraclavicular adenopathy present.        Left: No inguinal and no supraclavicular adenopathy present.   Neurological: She is alert and oriented to person, place, and time. No cranial nerve deficit.   Bilateral lower extremity strength, 4 /5   Skin: Skin is warm and dry. No rash noted.   Slight brownish discoloration the palms of hands, no erythema nor skin peeling.   Psychiatric: She has a normal mood and affect. Her behavior is normal.       RECENT LABS:  Hematology WBC   Date Value Ref Range Status   05/22/2018 3.39 (L) 4.00 - 10.00 10*3/mm3 Final     RBC   Date Value Ref Range Status   05/22/2018 3.71 (L) 3.90 - 5.00 10*6/mm3  Final     Hemoglobin   Date Value Ref Range Status   05/22/2018 12.3 11.5 - 14.9 g/dL Final     Hematocrit   Date Value Ref Range Status   05/22/2018 37.4 34.0 - 45.0 % Final     Platelets   Date Value Ref Range Status   05/22/2018 251 150 - 375 10*3/mm3 Final        Lab Results   Component Value Date    GLUCOSE 117 05/22/2018    BUN 19 05/22/2018    CREATININE 0.81 05/22/2018    EGFRIFNONA 73 05/22/2018    EGFRIFAFRI 80 09/25/2017    BCR 23.5 05/22/2018    K 4.8 (H) 05/22/2018    CO2 28.5 05/22/2018    CALCIUM 9.5 05/22/2018    PROTENTOTREF 6.4 09/25/2017    ALBUMIN 4.10 05/22/2018    LABIL2 1.7 05/22/2018    AST 16 05/22/2018    ALT 13 05/22/2018         Assessment/Plan      1. Previous Stage Ib ([id number]) right breast cancer:  · Diagnosed May 2010 with excisional biopsy for invasive ductal carcinoma, 1.3 cm, grade 2, ER 90%, TX 80%, HER-2/peter negative (1+ IHC).    · Subsequent right mastectomy in July 2010 with no residual breast malignancy, 1/5 sentinel lymph node with micrometastasis (0.25 mm).    · Treated in the [facility] system with adjuvant AC ×4 cycles in 2010 (no taxanes administered due to underlying Charcot-Saloni-Tooth with peripheral neuropathy).    · Adjuvant Femara (postmenopausal) initiated October 2010 with plan to treat ×10 years.    · Genetic testing reportedly negative.    · Developed osteopenia treated with Prolia beginning 2/27/13. Subsequently discontinued due to identification of metastatic disease.  2. Recurrent/metastatic disease identified 10/8/17:  · Disease involving thoracic spine with cord compression at T6, lumbosacral involvement, sternal and right sternoclavicular involvement.    · Femara discontinued in 10/2017.    · Radiation administered (in the [facility] system) to the thoracic spine beginning 10/19/17 treating T3-T9 to a dose of 24 gray in 6 fractions.  · Evaluation with MRI 12/8/17 showing persistent T6 cord compression with persistent neurologic compromise requiring surgical  treatment 12/11/17 with T6 laminectomy/corpectomy and T3-T9 fusion.  Pathology with metastatic carcinoma of breast origin, ER negative, ME negative, HER-2/peter negative (1+ IHC).  · Additional staging evaluation 12/8/17 with no evidence of visceral metastatic disease, bone scan showing involvement of thoracic spine, sternum, left humerus, mid frontal bone.  No plane film correlate of left humerus lesion.  MRI lumbar spine with small intradural L3 metastasis.  CA 15-3 12/6/17- 17.  · Palliative radiation therapy to L3 dural metastasis and left humerus initiated 1/15/18 (10 fractions), completed 1/26/18.  · Hypercalcemia of malignancy with calcium in the 10-11 range.  · Initiation of monthly Xgeva 1/23/18.  · Baseline CT scan 1/30/18 with no evidence of visceral involvement.  Cluster of nodular opacities in the right lower lobe suspected to be infectious or related to bronchiolitis. Bone scan 1/30/18 showed postsurgical change in the thoracic spine, stable uptake in the frontal bone, no new areas of disease.  · Initiation of palliative oral single agent Xeloda 2/7/18 2000 mg a.m., 1500 mg p.m. for 14/21 days.   · Following 3 cycles xeloda, bone scan 4/4/18 showed no change from the prior study.  CT scan 4/4/18 showed a small pericardial effusion of unclear significance as well as a subcutaneous nodule in the right lateral chest wall.  Subsequent evaluation with echocardiogram 4/17/18 showed no evidence of pericardial effusion.  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  We discussed the possibility of surgical excision of the right subcutaneous chest wall lesion for more definitive diagnosis.  Reviewed previous CT images dating back to 12/8/17 and the lesion was present even at that time measuring around 1.7 cm although not commented on in the radiology report.  As this  appears to be an indolent low-grade process unrelated to her breast cancer, recommendee foregoing surgical excision at this time and monitoring this area on future scans.  The patient agreed.    · Patient returns today to begin her sixth cycle of palliative oral single agent Xeloda.  She continues to tolerate this well.  Tomorrow, she will proceed on with treatment taking 2000 mg in the morning, 1500 mg in the evening for 14/21 days.  We will plan repeat scans at the end of this cycle with CT scan and bone scan in 2 weeks.  I will see her in 3 weeks for follow-up.  3. Right hip/gluteal hematoma:  · The patient had developed considerable pain in the right hip and underwent MRI 3/22/18 visit showed a 5.7 x 4.3 x 11 cm hematoma in the right gluteal region.  · Lovenox was held 3/23 through 4/6/18.  Resolution of pain, Lovenox was resumed. Hematoma likely occurred due to minimal trauma from transfer out of her wheelchair.  Advised to exercise significant caution in the future with mobility.  4. Right pulmonary embolism:  · Diagnosed on CT angiogram 10/21/17 involving small right lower lobe pulmonary artery.  Lower extremity Dopplers negative.  · Bilateral lower extremity Dopplers negative again 12/5/17.  · Continuing on Lovenox 1 mg/kg (80 mg) subcutaneous injection every 12 hours.  Lovenox was held for 2 weeks due to right hip hematoma seen on MRI, treatment resumed on 4/6/18 with no further obvious bleeding issues.  5. Cancer related pain:  · Previously receiving Duragesic 50 µg patch every 72 hours along with Dilaudid 4 mg as needed for breakthrough pain  · The patient's pain improved over time and she was able to discontinue both Duragesic and Dilaudid in the interval.  · Patient does take occasional Flexeril at bedtime due to back spasm/pain when she has been more active.  · Right hip pain as noted above due to right gluteal hematoma, previously prescribed Dilaudid 4 mg every 4 hours as needed #60 with no refill.   Pain from hematoma resolved and she is no longer requiring Dilaudid.  6. Constipation:  · Currently receiving lactulose 20 g twice a day, MiraLAX daily, Senokot 2 tablets twice daily.  Constipation is currently well controlled.  7. Traumatic left tibia/fibular fracture:  · Status post ORIF 12/6/17  · Specimen was sent for pathologic review, negative for evidence of malignancy  8. Hypercalcemia:  · Suspect hypercalcemia of malignancy, calcium in  10-11 range previously.  · Calcium normalized following initiation of monthly Xgeva on 1/23/18.  9. Chemotherapy-induced mucositis:  · Patient had a minimal degree of mucositis with cycle 2.  We will call in magic mouthwash to use as needed.  No subsequent mucositis.  10. Recurrent UTI, bladder wall thickening on CT:  · Patient had an enterococcal UTI on 3/2/18 sensitive to nitrofurantoin and received treatment, unclear how long.  · Recurrent UTI 3/20/18 with urine culture growing Klebsiella, initially treated with nitrofurantoin, transitioned to Levaquin.  · CT 4/4/18 with diffuse bladder wall thickening with increased nodular thickening at the left base.  Referral to urogynecology Dr. May Johnson.  She was placed on a prophylactic dose of trimethoprim 100 mg daily, bladder wall thickening felt to be related to recent recurrent urinary tract infections.  There are no plans for cystoscopy currently.  The patient remains asymptomatic in this regard.    Plan:  1. Proceed tomorrow, 5/23/18, with cycle 6 Xeloda (2000 mg a.m., 1500 mg p.m. for 14/21 days).  2. Continue Lovenox 80 mg every 12 hours.  3. Continue outpatient physical therapy at [facility]  4. In 2 weeks CT chest abdomen pelvis and bone scan  5. In 3 weeks M.D. visit CBC, CMP, magnesium, phosphorus.  The patient will be due to begin cycle 7 Xeloda (pending scan reports) as well as monthly Xgeva.

## 2018-05-30 ENCOUNTER — OFFICE VISIT (OUTPATIENT)
Dept: ORTHOPEDIC SURGERY | Facility: CLINIC | Age: 58
End: 2018-05-30

## 2018-05-30 VITALS — TEMPERATURE: 97.6 F | WEIGHT: 195 LBS | BODY MASS INDEX: 35.88 KG/M2 | HEIGHT: 62 IN

## 2018-05-30 DIAGNOSIS — Z98.890 HISTORY OF OPEN REDUCTION AND INTERNAL FIXATION (ORIF) PROCEDURE: ICD-10-CM

## 2018-05-30 DIAGNOSIS — M54.6 THORACIC SPINE PAIN: Primary | ICD-10-CM

## 2018-05-30 PROCEDURE — 99213 OFFICE O/P EST LOW 20 MIN: CPT | Performed by: ORTHOPAEDIC SURGERY

## 2018-05-30 PROCEDURE — 72070 X-RAY EXAM THORAC SPINE 2VWS: CPT | Performed by: ORTHOPAEDIC SURGERY

## 2018-05-30 PROCEDURE — 73590 X-RAY EXAM OF LOWER LEG: CPT | Performed by: ORTHOPAEDIC SURGERY

## 2018-05-30 NOTE — PROGRESS NOTES
She is doing great now 5 months out here from drastic fusion which I performed with Dr. Gonzalez and then internal fixation with intramedullary nail of left tibial fracture performed by Dr. nelson and I took over watching her she is actually standing and walking with assistance still a dense foot drop on the left but overall her strength is improving and that she couldn't be doing better from our standpoint two-view x-rays of the thoracic spine suggest a solid fusion certainly no hardware loosening compared to prior films the there is some extra bony healing.  Her posture looks good.  Left tibial x-rays show a solidly healed fracture of the distal third.  She's doing well and at this point I'm going to see her back in 3 months and she'll continue therapy.

## 2018-06-06 ENCOUNTER — HOSPITAL ENCOUNTER (OUTPATIENT)
Dept: NUCLEAR MEDICINE | Facility: HOSPITAL | Age: 58
Discharge: HOME | End: 2018-06-06
Attending: INTERNAL MEDICINE

## 2018-06-06 ENCOUNTER — HOSPITAL ENCOUNTER (OUTPATIENT)
Dept: CT IMAGING | Facility: HOSPITAL | Age: 58
Discharge: HOME | End: 2018-06-06
Attending: INTERNAL MEDICINE | Admitting: INTERNAL MEDICINE

## 2018-06-06 DIAGNOSIS — C50.811 MALIGNANT NEOPLASM OF OVERLAPPING SITES OF RIGHT BREAST IN FEMALE, ESTROGEN RECEPTOR NEGATIVE (HCC): ICD-10-CM

## 2018-06-06 DIAGNOSIS — Z17.1 MALIGNANT NEOPLASM OF OVERLAPPING SITES OF RIGHT BREAST IN FEMALE, ESTROGEN RECEPTOR NEGATIVE (HCC): ICD-10-CM

## 2018-06-06 LAB — CREAT BLDA-MCNC: 0.5 MG/DL (ref 0.6–1.3)

## 2018-06-06 PROCEDURE — 71260 CT THORAX DX C+: CPT

## 2018-06-06 PROCEDURE — 74177 CT ABD & PELVIS W/CONTRAST: CPT

## 2018-06-06 PROCEDURE — 25010000002 IOPAMIDOL 61 % SOLUTION: Performed by: INTERNAL MEDICINE

## 2018-06-06 PROCEDURE — 78306 BONE IMAGING WHOLE BODY: CPT

## 2018-06-06 PROCEDURE — 82565 ASSAY OF CREATININE: CPT

## 2018-06-06 PROCEDURE — 0 TECHNETIUM MEDRONATE KIT: Performed by: INTERNAL MEDICINE

## 2018-06-06 PROCEDURE — A9503 TC99M MEDRONATE: HCPCS | Performed by: INTERNAL MEDICINE

## 2018-06-06 RX ORDER — TC 99M MEDRONATE 20 MG/10ML
21.2 INJECTION, POWDER, LYOPHILIZED, FOR SOLUTION INTRAVENOUS
Status: COMPLETED | OUTPATIENT
Start: 2018-06-06 | End: 2018-06-06

## 2018-06-06 RX ADMIN — Medication 21.2 MILLICURIE: at 11:45

## 2018-06-06 RX ADMIN — IOPAMIDOL 85 ML: 612 INJECTION, SOLUTION INTRAVENOUS at 12:45

## 2018-06-13 ENCOUNTER — OFFICE VISIT (OUTPATIENT)
Dept: ONCOLOGY | Facility: CLINIC | Age: 58
End: 2018-06-13

## 2018-06-13 ENCOUNTER — LAB (OUTPATIENT)
Dept: LAB | Facility: HOSPITAL | Age: 58
End: 2018-06-13

## 2018-06-13 ENCOUNTER — INFUSION (OUTPATIENT)
Dept: ONCOLOGY | Facility: HOSPITAL | Age: 58
End: 2018-06-13

## 2018-06-13 VITALS
SYSTOLIC BLOOD PRESSURE: 130 MMHG | OXYGEN SATURATION: 98 % | RESPIRATION RATE: 16 BRPM | TEMPERATURE: 98.7 F | HEART RATE: 62 BPM | HEIGHT: 62 IN | DIASTOLIC BLOOD PRESSURE: 82 MMHG

## 2018-06-13 DIAGNOSIS — Z17.1 MALIGNANT NEOPLASM OF OVERLAPPING SITES OF RIGHT BREAST IN FEMALE, ESTROGEN RECEPTOR NEGATIVE (HCC): ICD-10-CM

## 2018-06-13 DIAGNOSIS — C50.811 MALIGNANT NEOPLASM OF OVERLAPPING SITES OF RIGHT BREAST IN FEMALE, ESTROGEN RECEPTOR NEGATIVE (HCC): ICD-10-CM

## 2018-06-13 DIAGNOSIS — G89.3 CANCER ASSOCIATED PAIN: ICD-10-CM

## 2018-06-13 DIAGNOSIS — C50.811 MALIGNANT NEOPLASM OF OVERLAPPING SITES OF RIGHT BREAST IN FEMALE, ESTROGEN RECEPTOR NEGATIVE (HCC): Primary | ICD-10-CM

## 2018-06-13 DIAGNOSIS — C79.51 BONE METASTASES: Primary | ICD-10-CM

## 2018-06-13 DIAGNOSIS — Z17.1 MALIGNANT NEOPLASM OF OVERLAPPING SITES OF RIGHT BREAST IN FEMALE, ESTROGEN RECEPTOR NEGATIVE (HCC): Primary | ICD-10-CM

## 2018-06-13 LAB
ALBUMIN SERPL-MCNC: 3.8 G/DL (ref 3.5–5.2)
ALBUMIN/GLOB SERPL: 1.5 G/DL (ref 1.1–2.4)
ALP SERPL-CCNC: 61 U/L (ref 38–116)
ALT SERPL W P-5'-P-CCNC: 17 U/L (ref 0–33)
ANION GAP SERPL CALCULATED.3IONS-SCNC: 11.5 MMOL/L
AST SERPL-CCNC: 18 U/L (ref 0–32)
BASOPHILS # BLD AUTO: 0.02 10*3/MM3 (ref 0–0.1)
BASOPHILS NFR BLD AUTO: 0.6 % (ref 0–1.1)
BILIRUB SERPL-MCNC: 0.3 MG/DL (ref 0.1–1.2)
BUN BLD-MCNC: 20 MG/DL (ref 6–20)
BUN/CREAT SERPL: 23 (ref 7.3–30)
CALCIUM SPEC-SCNC: 9 MG/DL (ref 8.5–10.2)
CHLORIDE SERPL-SCNC: 105 MMOL/L (ref 98–107)
CO2 SERPL-SCNC: 26.5 MMOL/L (ref 22–29)
CREAT BLD-MCNC: 0.87 MG/DL (ref 0.6–1.1)
DEPRECATED RDW RBC AUTO: 70.4 FL (ref 37–49)
EOSINOPHIL # BLD AUTO: 0.12 10*3/MM3 (ref 0–0.36)
EOSINOPHIL NFR BLD AUTO: 3.6 % (ref 1–5)
ERYTHROCYTE [DISTWIDTH] IN BLOOD BY AUTOMATED COUNT: 18.6 % (ref 11.7–14.5)
GFR SERPL CREATININE-BSD FRML MDRD: 67 ML/MIN/1.73
GLOBULIN UR ELPH-MCNC: 2.5 GM/DL (ref 1.8–3.5)
GLUCOSE BLD-MCNC: 102 MG/DL (ref 74–124)
HCT VFR BLD AUTO: 35.7 % (ref 34–45)
HGB BLD-MCNC: 11.8 G/DL (ref 11.5–14.9)
IMM GRANULOCYTES # BLD: 0 10*3/MM3 (ref 0–0.03)
IMM GRANULOCYTES NFR BLD: 0 % (ref 0–0.5)
LYMPHOCYTES # BLD AUTO: 0.96 10*3/MM3 (ref 1–3.5)
LYMPHOCYTES NFR BLD AUTO: 28.7 % (ref 20–49)
MAGNESIUM SERPL-MCNC: 2 MG/DL (ref 1.8–2.5)
MCH RBC QN AUTO: 33.9 PG (ref 27–33)
MCHC RBC AUTO-ENTMCNC: 33.1 G/DL (ref 32–35)
MCV RBC AUTO: 102.6 FL (ref 83–97)
MONOCYTES # BLD AUTO: 0.48 10*3/MM3 (ref 0.25–0.8)
MONOCYTES NFR BLD AUTO: 14.3 % (ref 4–12)
NEUTROPHILS # BLD AUTO: 1.77 10*3/MM3 (ref 1.5–7)
NEUTROPHILS NFR BLD AUTO: 52.8 % (ref 39–75)
NRBC BLD MANUAL-RTO: 0 /100 WBC (ref 0–0)
PHOSPHATE SERPL-MCNC: 3.6 MG/DL (ref 2.5–4.5)
PLATELET # BLD AUTO: 228 10*3/MM3 (ref 150–375)
PMV BLD AUTO: 9.6 FL (ref 8.9–12.1)
POTASSIUM BLD-SCNC: 4.5 MMOL/L (ref 3.5–4.7)
PROT SERPL-MCNC: 6.3 G/DL (ref 6.3–8)
RBC # BLD AUTO: 3.48 10*6/MM3 (ref 3.9–5)
SODIUM BLD-SCNC: 143 MMOL/L (ref 134–145)
WBC NRBC COR # BLD: 3.35 10*3/MM3 (ref 4–10)

## 2018-06-13 PROCEDURE — 85025 COMPLETE CBC W/AUTO DIFF WBC: CPT | Performed by: INTERNAL MEDICINE

## 2018-06-13 PROCEDURE — 36415 COLL VENOUS BLD VENIPUNCTURE: CPT | Performed by: INTERNAL MEDICINE

## 2018-06-13 PROCEDURE — 99214 OFFICE O/P EST MOD 30 MIN: CPT | Performed by: INTERNAL MEDICINE

## 2018-06-13 PROCEDURE — 80053 COMPREHEN METABOLIC PANEL: CPT | Performed by: INTERNAL MEDICINE

## 2018-06-13 PROCEDURE — 83735 ASSAY OF MAGNESIUM: CPT | Performed by: INTERNAL MEDICINE

## 2018-06-13 PROCEDURE — 96372 THER/PROPH/DIAG INJ SC/IM: CPT | Performed by: INTERNAL MEDICINE

## 2018-06-13 PROCEDURE — 84100 ASSAY OF PHOSPHORUS: CPT | Performed by: INTERNAL MEDICINE

## 2018-06-13 PROCEDURE — 25010000002 DENOSUMAB 120 MG/1.7ML SOLUTION: Performed by: INTERNAL MEDICINE

## 2018-06-13 RX ADMIN — DENOSUMAB 120 MG: 120 INJECTION SUBCUTANEOUS at 15:11

## 2018-06-13 NOTE — PROGRESS NOTES
Subjective .     REASONS FOR FOLLOWUP:    1. Stage Ib ([id number]) right breast cancer: Diagnosed May 2010 with excisional biopsy for invasive ductal carcinoma, 1.3 cm, grade 2, ER 90%, TN 80%, HER-2/peter negative (1+ IHC).  Subsequent right mastectomy in July 2010 with no residual breast malignancy, 1/5 sentinel lymph node with micrometastasis (0.25 mm).  Treated in the [facility] system with adjuvant AC ×4 cycles in 2010 (no taxanes administered due to underlying Charcot-Saloni-Tooth with peripheral neuropathy).  Adjuvant Femara (postmenopausal) initiated October 2010 with plan to treat ×10 years.  Genetic testing reportedly negative.  Developed osteopenia treated with Prolia beginning 2/27/13.  2. Recurrent/metastatic disease identified 10/8/17 involving thoracic spine with cord compression at T6, lumbosacral involvement, sternal and right sternoclavicular involvement.  Femara discontinued.  Radiation administered (in the [facility] system) to the thoracic spine beginning 10/19/17 treating T3-T9 to a dose of 24 gray in 6 fractions.  3. Evaluation with MRI 12/8/17 showing persistent T6 cord compression with persistent neurologic compromise requiring surgical treatment 12/11/17 with T6 laminectomy/corpectomy and T3-T9 fusion.  Pathology with metastatic carcinoma of breast origin, ER negative, TN negative, HER-2/peter negative (1+ IHC).  4. Right pulmonary embolism 10/21/17 continuing on Lovenox 1 mg/kg (100 mg) every 12 hours.  No evidence of DVT.  Lovenox held due to right gluteus hematoma 3/23/18 through 4/6/18.  5. Cancer related pain previously on Duragesic 50 µg patch every 72 hours and Dilaudid 4 mg as needed for breakthrough pain.  Narcotics subsequently discontinued with improvement in pain in January 2018.  6. Radiation therapy to L3 dural metastasis and left humerus initiated 1/15/18 (10 fractions), completed 1/26/18  7. Monthly Xgeva initiated 1/23/18  8. Mild hypercalcemia of malignancy  9. Initiation of  "palliative oral single agent Xeloda 2/7/18 (2000 mg a.m., 1500 mg p.m. for 14/21 days).  10. 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  In reviewing prior CT scans, has been present for some time.  We will monitor for now, if it enlarges consider surgical excision.    HISTORY OF PRESENT ILLNESS:  The patient is a 57 y.o. year old female who is here for follow-up with the above-mentioned history.    History of Present Illness  The patient returns today in follow-up ready to begin her 7th cycle of palliative Xeloda chemotherapy and continuing on monthly Xgeva which is due today with laboratory studies, CT scans, and bone scan to review.  In the interval since her last visit, the patient has continued with her intensive outpatient rehabilitation at [facility].  She has continued to improve in terms of her mobility.  Her occupational therapy has led to significant improvements in her functional status, now able to get into the bathroom on her own and to transfer into bed on her own.  In fact they have ordered a chair lift which should be delivered today to allow her to go upstairs to sleep.  She has been using a hospital bed on the first floor.  She continues to tolerate Xeloda.  Well.  She has some mild hand-foot symptoms which are tolerable and she continues to use Aquaphor regularly.  There is no skin peeling.  She notes that her hair loss has improved.  She denies any mucositis symptoms.  She does have a few loose stools on her week off of treatment relieved with Imodium.  Appetite remains normal.  She has no new musculoskeletal pain.  She does note recently hearing a \"whooshing\" noise in her ears intermittently but feels this may be related to some mild sinus congestion.  She denies any headaches, denies any new neurologic symptoms.    Past " Medical History:   Diagnosis Date   • Acute pulmonary embolism, cancer-related 10/2017   • Allergic rhinitis    • Arthritis     Right knee; left shoulder   • Cancer 2010    Right breast   • Cancer 10/2017    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   • Charcot-Saloni-Tooth disease    • CPAP (continuous positive airway pressure) dependence    • GERD (gastroesophageal reflux disease)    • H/O Colon polyps    • H/O Uterine fibroid    • Heart murmur    • Hyperlipidemia    • Hypertension    • PONV (postoperative nausea and vomiting)      Past Surgical History:   Procedure Laterality Date   • BLADDER SURGERY      Bladder lift   • BREAST RECONSTRUCTION, BREAST TISSUE EXPANDER INSERTION Right    • BREAST SURGERY Right 2010    Mastectomy   •  SECTION  ,    • CHOLECYSTECTOMY     • COLONOSCOPY     • HERNIA REPAIR  2015    [facility]   • HYSTERECTOMY      Partial   • KNEE SURGERY Right    • LEG SURGERY Left     Broken   • MASTECTOMY Right    • DE TREAT TIBIAL SHAFT FX, INTRAMED IMPLANT Left 2017    Procedure: TIBIA INTRAMEDULLARY NAIL/DON INSERTION;  Surgeon: Romero Shanks MD;  Location: [facility];  Service: Orthopedics   • THORACIC DECOMPRESSION POSTERIOR FUSION WITH INSTRUMENTATION N/A 2017    Procedure: T6 costotransversectomy and decompression with T3 to  T9 posterior spinal fusion with instrumentation with Jennifer Gonzalez and Nael Wilkes;  Surgeon: Quan Nayak MD;  Location: [facility];  Service:        ONCOLOGIC HISTORY:  (History from previous dates can be found in the separate document.)    Current Outpatient Prescriptions on File Prior to Visit   Medication Sig Dispense Refill   • acetaminophen (TYLENOL) 500 MG tablet Take 500 mg by mouth Every 6 (Six) Hours As Needed for Mild Pain .     • calcium carbonate (OS-CARY) 600 MG tablet Take 600 mg by mouth 2 (Two) Times a Day With Meals.     • capecitabine (XELODA) 500 MG chemo tablet Take 4 tabs (2000 mg) in the AM  then take 3 tabs (1500 mg) in the PM for 14 days on then 7 days off. 98 tablet 3   • cyclobenzaprine (FLEXERIL) 10 MG tablet Take 1 tablet by mouth 3 (Three) Times a Day As Needed for Muscle Spasms. 30 tablet 3   • DULoxetine (CYMBALTA) 30 MG capsule Take 1 capsule by mouth Daily. 30 capsule 5   • enoxaparin (LOVENOX) 80 MG/0.8ML solution syringe INJECT 0.8 ML UNDER THE SKIN Q 12 H  3   • FLONASE ALLERGY RELIEF 50 MCG/ACT nasal spray 2 sprays into each nostril daily.  11   • gabapentin (NEURONTIN) 300 MG capsule Take 1 capsule by mouth 3 (Three) Times a Day. 90 capsule 2   • glycerin (ADULT) 2 g suppository rectal suppository Insert 2 g into the rectum.     • HYDROmorphone (DILAUDID) 4 MG tablet Take 1 tablet by mouth Every 4 (Four) Hours As Needed for Moderate Pain . 60 tablet 0   • lactulose (CHRONULAC) 10 GM/15ML solution Take 30 mL by mouth 2 (Two) Times a Day. 1892 mL 2   • mesalamine (LIALDA) 1.2 g EC tablet Take 1 tablet by mouth 2 (Two) Times a Day. 180 tablet 1   • minoxidil (ROGAINE) 2 % external solution Apply  topically Daily. 120 mL 1   • nystatin (MYCOSTATIN) 747235 UNIT/GM ointment Apply  topically 2 (Two) Times a Day.     • nystatin (nystatin) 014211 UNIT/GM powder Apply  topically 4 (Four) Times a Day.     • omeprazole (PriLOSEC) 40 MG capsule TAKE 1 CAPSULE DAILY 90 capsule 2   • ondansetron ODT (ZOFRAN-ODT) 8 MG disintegrating tablet Take 1 tablet by mouth Every 8 (Eight) Hours As Needed for Nausea or Vomiting. 30 tablet 1   • phenazopyridine (PYRIDIUM) 200 MG tablet Take 1 tablet by mouth 3 (Three) Times a Day As Needed for bladder spasms. 15 tablet 2   • polyethylene glycol (MIRALAX) packet Take 17 g by mouth Daily.     • psyllium (METAMUCIL) 58.6 % packet Take 1 packet by mouth Daily.     • saccharomyces boulardii (FLORASTOR) 250 MG capsule Take 250 mg by mouth 2 (Two) Times a Day.     • sennosides-docusate sodium (SENOKOT-S) 8.6-50 MG tablet Take 2 tablets by mouth 2 (Two) Times a Day. 90  tablet 2   • traMADol (ULTRAM) 50 MG tablet Take 1 tablet by mouth Every 8 (Eight) Hours As Needed for Moderate Pain . 30 tablet 0   • trimethoprim (TRIMPEX) 100 MG tablet Take 100 mg by mouth Daily.  2   • Tuberculin PPD (APLISOL ID) Inject  into the skin.     • Unable to find SWISH AND SPIT 5 ML PO Q 2 H PRN  0     No current facility-administered medications on file prior to visit.        ALLERGIES:     Allergies   Allergen Reactions   • Hydrocodone Nausea Only   • Morphine And Related Nausea And Vomiting     Social History     Social History   • Marital status:      Spouse name: Murray   • Number of children: 3   • Years of education: College     Occupational History   •  Retired     Social History Main Topics   • Smoking status: Never Smoker   • Smokeless tobacco: Never Used   • Alcohol use Yes      Comment: social   • Drug use: No   • Sexual activity: Defer     Other Topics Concern   • Not on file     Social History Narrative   • No narrative on file     Family History   Problem Relation Age of Onset   • Heart disease Mother    • Hyperlipidemia Mother    • Hypertension Mother    • Diabetes Father    • Charcot-Saloni-Tooth disease Father    • Heart disease Father    • Hypertension Father    • Heart failure Father    • Diabetes Sister    • Heart disease Sister    • Hypertension Sister    • Heart disease Maternal Aunt    • Scoliosis Maternal Aunt    • Heart disease Maternal Uncle    • Diabetes Maternal Grandmother    • Heart disease Maternal Grandmother    • Hypertension Maternal Grandmother    • Uterine cancer Maternal Grandmother    • Heart disease Maternal Grandfather      Review of Systems   Constitutional: Negative for activity change, appetite change, fatigue, fever and unexpected weight change.   HENT: Negative for congestion, mouth sores, nosebleeds, sore throat and voice change.    Respiratory: Negative for cough, shortness of breath and wheezing.    Cardiovascular: Negative for chest pain,  palpitations and leg swelling.   Gastrointestinal: Negative for abdominal distention, abdominal pain, blood in stool, constipation, diarrhea, nausea and vomiting.   Endocrine: Negative for cold intolerance and heat intolerance.   Genitourinary: Negative for difficulty urinating, dysuria, frequency and hematuria.   Musculoskeletal: Positive for back pain. Negative for arthralgias, joint swelling and myalgias.   Skin: Negative for rash.   Neurological: Positive for weakness. Negative for dizziness, syncope, light-headedness, numbness and headaches.   Hematological: Negative for adenopathy. Does not bruise/bleed easily.   Psychiatric/Behavioral: Negative for confusion and sleep disturbance. The patient is not nervous/anxious.          Objective      Vitals:    06/13/18 1412   BP: 130/82   Pulse: 62   Resp: 16   Temp: 98.7 °F (37.1 °C)   SpO2: 98%      Current Status 6/13/2018   ECOG score 1   Pain 0/10    Physical Exam   Constitutional: She is oriented to person, place, and time. She appears well-developed and well-nourished.   The patient is currently seated in a wheelchair in no distress.   HENT:   Mouth/Throat: Oropharynx is clear and moist.   Eyes: Conjunctivae are normal.   Neck: No thyromegaly present.   Cardiovascular: Normal rate and regular rhythm.  Exam reveals no gallop and no friction rub.    No murmur heard.  Pulmonary/Chest: Breath sounds normal. No respiratory distress.   Abdominal: Soft. Bowel sounds are normal. She exhibits no distension. There is no tenderness.   Musculoskeletal: She exhibits edema.   Lower extremity braces in place.  Trace bilateral lower extremity edema.   Lymphadenopathy:        Head (right side): No submandibular adenopathy present.     She has no cervical adenopathy.     She has no axillary adenopathy.        Right: No inguinal and no supraclavicular adenopathy present.        Left: No inguinal and no supraclavicular adenopathy present.   Neurological: She is alert and oriented  to person, place, and time. No cranial nerve deficit.   Bilateral lower extremity strength, 4 /5   Skin: Skin is warm and dry. No rash noted.   Slight brownish discoloration the palms of hands, no erythema nor skin peeling.   Psychiatric: She has a normal mood and affect. Her behavior is normal.       RECENT LABS:  Hematology WBC   Date Value Ref Range Status   06/13/2018 3.35 (L) 4.00 - 10.00 10*3/mm3 Final     RBC   Date Value Ref Range Status   06/13/2018 3.48 (L) 3.90 - 5.00 10*6/mm3 Final     Hemoglobin   Date Value Ref Range Status   06/13/2018 11.8 11.5 - 14.9 g/dL Final     Hematocrit   Date Value Ref Range Status   06/13/2018 35.7 34.0 - 45.0 % Final     Platelets   Date Value Ref Range Status   06/13/2018 228 150 - 375 10*3/mm3 Final        Lab Results   Component Value Date    GLUCOSE 102 06/13/2018    BUN 20 06/13/2018    CREATININE 0.87 06/13/2018    EGFRIFNONA 67 06/13/2018    EGFRIFAFRI 80 09/25/2017    BCR 23.0 06/13/2018    K 4.5 06/13/2018    CO2 26.5 06/13/2018    CALCIUM 9.0 06/13/2018    PROTENTOTREF 6.4 09/25/2017    ALBUMIN 3.80 06/13/2018    LABIL2 1.5 06/13/2018    AST 18 06/13/2018    ALT 17 06/13/2018     CT chest abdomen pelvis 6/6/18: I did personally review CT images in the computer today.  IMPRESSION:  1. There is no significant change in a 2 cm subcutaneous nodule at the  right lateral chest wall. There are 2 new similar appearing subcutaneous  nodules within the anterior abdominal wall as described. These new  nodules are worrisome for metastases, but may possibly be related to the  subcutaneous injections at the anterior abdominal wall.  2. Cluster of nodular opacities at the superior segment of the right  lower lobe are stable.    Bone scan 6/6/18:  IMPRESSION:  No interval change.       Assessment/Plan      1. Previous Stage Ib ([id number]) right breast cancer:  · Diagnosed May 2010 with excisional biopsy for invasive ductal carcinoma, 1.3 cm, grade 2, ER 90%, NV 80%, HER-2/peter  negative (1+ IHC).    · Subsequent right mastectomy in July 2010 with no residual breast malignancy, 1/5 sentinel lymph node with micrometastasis (0.25 mm).    · Treated in the [facility] system with adjuvant AC ×4 cycles in 2010 (no taxanes administered due to underlying Charcot-Saloni-Tooth with peripheral neuropathy).    · Adjuvant Femara (postmenopausal) initiated October 2010 with plan to treat ×10 years.    · Genetic testing reportedly negative.    · Developed osteopenia treated with Prolia beginning 2/27/13. Subsequently discontinued due to identification of metastatic disease.  2. Recurrent/metastatic disease identified 10/8/17:  · Disease involving thoracic spine with cord compression at T6, lumbosacral involvement, sternal and right sternoclavicular involvement.    · Femara discontinued in 10/2017.    · Radiation administered (in the [facility] system) to the thoracic spine beginning 10/19/17 treating T3-T9 to a dose of 24 gray in 6 fractions.  · Evaluation with MRI 12/8/17 showing persistent T6 cord compression with persistent neurologic compromise requiring surgical treatment 12/11/17 with T6 laminectomy/corpectomy and T3-T9 fusion.  Pathology with metastatic carcinoma of breast origin, ER negative, MA negative, HER-2/peter negative (1+ IHC).  · Additional staging evaluation 12/8/17 with no evidence of visceral metastatic disease, bone scan showing involvement of thoracic spine, sternum, left humerus, mid frontal bone.  No plane film correlate of left humerus lesion.  MRI lumbar spine with small intradural L3 metastasis.  CA 15-3 12/6/17- 17.  · Palliative radiation therapy to L3 dural metastasis and left humerus initiated 1/15/18 (10 fractions), completed 1/26/18.  · Hypercalcemia of malignancy with calcium in the 10-11 range.  · Initiation of monthly Xgeva 1/23/18.  · Baseline CT scan 1/30/18 with no evidence of visceral involvement.  Cluster of nodular opacities in the right lower lobe suspected to be infectious  or related to bronchiolitis. Bone scan 1/30/18 showed postsurgical change in the thoracic spine, stable uptake in the frontal bone, no new areas of disease.  · Initiation of palliative oral single agent Xeloda 2/7/18 2000 mg a.m., 1500 mg p.m. for 14/21 days.   · Following 3 cycles xeloda, bone scan 4/4/18 showed no change from the prior study.  CT scan 4/4/18 showed a small pericardial effusion of unclear significance as well as a subcutaneous nodule in the right lateral chest wall.  Subsequent evaluation with echocardiogram 4/17/18 showed no evidence of pericardial effusion.  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  We discussed the possibility of surgical excision of the right subcutaneous chest wall lesion for more definitive diagnosis.  Reviewed previous CT images dating back to 12/8/17 and the lesion was present even at that time measuring around 1.7 cm although not commented on in the radiology report.  As this appears to be an indolent low-grade process unrelated to her breast cancer, recommendee foregoing surgical excision at this time and monitoring this area on future scans.  The patient agreed.    · Patient returns today to begin her 7th cycle of palliative oral single agent Xeloda, 2000 mg in the morning, 1500 mg in the evening for 14/21 days as well as monthly Xgeva.  The patient has scans to review today following 6 cycles of Xeloda.  CT scan of the chest abdomen and pelvis 6/6/18 shows stable findings, no evidence of progressive disease.  There was a comment regarding subcutaneous abnormality in the anterior abdominal wall and this is related to Lovenox injection sites.  Bone scan 6/6/18 shows no interval change.  The patient continues to tolerate treatment extremely well.  We will continue therefore with Xeloda cycle 7 which was initiated earlier today.   "She will receive her Xgeva today.  I will see her back in 3 weeks when she is due for cycle 8.  We will plan repeat CT scan and bone scan at the end of cycle 9.  The patient reports an audible \"whooshing\" in her ears occasionally feels this may be related to sinus congestion.  She has no headache nor any neurologic symptoms that are new.  If she develops any worsening of this we will plan to obtain an MRI of the brain.  Her last MRI brain in December was negative.  3. Right hip/gluteal hematoma:  · The patient had developed considerable pain in the right hip and underwent MRI 3/22/18 visit showed a 5.7 x 4.3 x 11 cm hematoma in the right gluteal region.  · Lovenox was held 3/23 through 4/6/18.  Resolution of pain, Lovenox was resumed. Hematoma likely occurred due to minimal trauma from transfer out of her wheelchair.   4. Right pulmonary embolism:  · Diagnosed on CT angiogram 10/21/17 involving small right lower lobe pulmonary artery.  Lower extremity Dopplers negative.  · Bilateral lower extremity Dopplers negative again 12/5/17.  · Continuing on Lovenox 1 mg/kg (80 mg) subcutaneous injection every 12 hours.  Lovenox was held for 2 weeks due to right hip hematoma seen on MRI, treatment resumed on 4/6/18 with no further obvious bleeding issues.  5. Cancer related pain:  · Previously receiving Duragesic 50 µg patch every 72 hours along with Dilaudid 4 mg as needed for breakthrough pain  · The patient's pain improved over time and she was able to discontinue both Duragesic and Dilaudid in the interval.  · Patient does take occasional Flexeril at bedtime due to back spasm/pain when she has been more active.  · Right hip pain as noted above due to right gluteal hematoma, previously prescribed Dilaudid 4 mg every 4 hours as needed #60 with no refill.  Pain from hematoma resolved and she is no longer requiring Dilaudid.  6. Chemotherapy-induced diarrhea:  · The patient reports some loose stools that occur during her week " off of Xeloda for which she takes an occasional Imodium with excellent control  7. Traumatic left tibia/fibular fracture:  · Status post ORIF 12/6/17  · Specimen was sent for pathologic review, negative for evidence of malignancy  8. Hypercalcemia:  · Suspect hypercalcemia of malignancy, calcium in  10-11 range previously.  · Calcium normalized following initiation of monthly Xgeva on 1/23/18.  9. Chemotherapy-induced mucositis:  · Patient had a minimal degree of mucositis with cycle 2.  We will call in magic mouthwash to use as needed.  No subsequent mucositis.  10. Recurrent UTI, bladder wall thickening on CT:  · Patient had an enterococcal UTI on 3/2/18 sensitive to nitrofurantoin and received treatment, unclear how long.  · Recurrent UTI 3/20/18 with urine culture growing Klebsiella, initially treated with nitrofurantoin, transitioned to Levaquin.  · CT 4/4/18 with diffuse bladder wall thickening with increased nodular thickening at the left base.  Referral to urogynecology Dr. May Johnson.  She was placed on a prophylactic dose of trimethoprim 100 mg daily, bladder wall thickening felt to be related to recent recurrent urinary tract infections.  There are no plans for cystoscopy currently.  The patient remains asymptomatic in this regard.    Plan:  1. Proceed today with cycle 7 Xeloda (2000 mg a.m., 1500 mg p.m. for 14/21 days).  2. Monthly Xgeva today  3. Continue Lovenox 80 mg every 12 hours.  4. Continue outpatient physical therapy at [facility]  5. In 3 weeks M.D. visit CBC, CMP.  The patient will be due to begin cycle 8 Xeloda.

## 2018-06-19 ENCOUNTER — TELEPHONE (OUTPATIENT)
Dept: ONCOLOGY | Facility: CLINIC | Age: 58
End: 2018-06-19

## 2018-06-19 NOTE — TELEPHONE ENCOUNTER
Order for surgical bras signed per dr. Hancock and faxed back to cynthia jimenez at [phone].  Confirmation received.

## 2018-07-03 ENCOUNTER — APPOINTMENT (OUTPATIENT)
Dept: LAB | Facility: HOSPITAL | Age: 58
End: 2018-07-03

## 2018-07-03 ENCOUNTER — APPOINTMENT (OUTPATIENT)
Dept: ONCOLOGY | Facility: CLINIC | Age: 58
End: 2018-07-03

## 2018-07-09 ENCOUNTER — DOCUMENTATION (OUTPATIENT)
Dept: ONCOLOGY | Facility: CLINIC | Age: 58
End: 2018-07-09

## 2018-07-09 RX ORDER — CAPECITABINE 500 MG/1
TABLET, FILM COATED ORAL
Qty: 98 TABLET | Refills: 3 | OUTPATIENT
Start: 2018-07-09 | End: 2018-09-10 | Stop reason: SDUPTHER

## 2018-07-09 NOTE — PROGRESS NOTES
[facility] requesting new Xeloda rx for pt. Per 6/13/18 office note from Dr Hancock-Pt will continue Xeloda 2000 mg in the Am then 1500 mg in the PM 14 days on then 7 days off. I have called in a verbal rx to [facility] [phone].

## 2018-07-10 DIAGNOSIS — C50.811 MALIGNANT NEOPLASM OF OVERLAPPING SITES OF RIGHT BREAST IN FEMALE, ESTROGEN RECEPTOR NEGATIVE (HCC): ICD-10-CM

## 2018-07-10 DIAGNOSIS — Z17.1 MALIGNANT NEOPLASM OF OVERLAPPING SITES OF RIGHT BREAST IN FEMALE, ESTROGEN RECEPTOR NEGATIVE (HCC): ICD-10-CM

## 2018-07-11 ENCOUNTER — LAB (OUTPATIENT)
Dept: LAB | Facility: HOSPITAL | Age: 58
End: 2018-07-11

## 2018-07-11 ENCOUNTER — INFUSION (OUTPATIENT)
Dept: ONCOLOGY | Facility: HOSPITAL | Age: 58
End: 2018-07-11

## 2018-07-11 ENCOUNTER — OFFICE VISIT (OUTPATIENT)
Dept: ONCOLOGY | Facility: CLINIC | Age: 58
End: 2018-07-11

## 2018-07-11 VITALS
RESPIRATION RATE: 14 BRPM | TEMPERATURE: 98.6 F | HEIGHT: 61 IN | SYSTOLIC BLOOD PRESSURE: 106 MMHG | OXYGEN SATURATION: 97 % | HEART RATE: 94 BPM | DIASTOLIC BLOOD PRESSURE: 64 MMHG

## 2018-07-11 DIAGNOSIS — Z17.1 MALIGNANT NEOPLASM OF OVERLAPPING SITES OF RIGHT BREAST IN FEMALE, ESTROGEN RECEPTOR NEGATIVE (HCC): Primary | ICD-10-CM

## 2018-07-11 DIAGNOSIS — C79.51 BONE METASTASES: Primary | ICD-10-CM

## 2018-07-11 DIAGNOSIS — Z17.1 MALIGNANT NEOPLASM OF OVERLAPPING SITES OF RIGHT BREAST IN FEMALE, ESTROGEN RECEPTOR NEGATIVE (HCC): ICD-10-CM

## 2018-07-11 DIAGNOSIS — C50.811 MALIGNANT NEOPLASM OF OVERLAPPING SITES OF RIGHT BREAST IN FEMALE, ESTROGEN RECEPTOR NEGATIVE (HCC): Primary | ICD-10-CM

## 2018-07-11 DIAGNOSIS — C50.811 MALIGNANT NEOPLASM OF OVERLAPPING SITES OF RIGHT BREAST IN FEMALE, ESTROGEN RECEPTOR NEGATIVE (HCC): ICD-10-CM

## 2018-07-11 DIAGNOSIS — G89.3 CANCER ASSOCIATED PAIN: ICD-10-CM

## 2018-07-11 LAB
ALBUMIN SERPL-MCNC: 3.9 G/DL (ref 3.5–5.2)
ALBUMIN/GLOB SERPL: 1.6 G/DL (ref 1.1–2.4)
ALP SERPL-CCNC: 62 U/L (ref 38–116)
ALT SERPL W P-5'-P-CCNC: 47 U/L (ref 0–33)
ANION GAP SERPL CALCULATED.3IONS-SCNC: 9.4 MMOL/L
AST SERPL-CCNC: 44 U/L (ref 0–32)
BASOPHILS # BLD AUTO: 0.04 10*3/MM3 (ref 0–0.1)
BASOPHILS NFR BLD AUTO: 1.1 % (ref 0–1.1)
BILIRUB SERPL-MCNC: 0.5 MG/DL (ref 0.1–1.2)
BUN BLD-MCNC: 19 MG/DL (ref 6–20)
BUN/CREAT SERPL: 21.3 (ref 7.3–30)
CALCIUM SPEC-SCNC: 8.6 MG/DL (ref 8.5–10.2)
CHLORIDE SERPL-SCNC: 103 MMOL/L (ref 98–107)
CO2 SERPL-SCNC: 27.6 MMOL/L (ref 22–29)
CREAT BLD-MCNC: 0.89 MG/DL (ref 0.6–1.1)
DEPRECATED RDW RBC AUTO: 69.4 FL (ref 37–49)
EOSINOPHIL # BLD AUTO: 0.12 10*3/MM3 (ref 0–0.36)
EOSINOPHIL NFR BLD AUTO: 3.3 % (ref 1–5)
ERYTHROCYTE [DISTWIDTH] IN BLOOD BY AUTOMATED COUNT: 18.5 % (ref 11.7–14.5)
GFR SERPL CREATININE-BSD FRML MDRD: 65 ML/MIN/1.73
GLOBULIN UR ELPH-MCNC: 2.4 GM/DL (ref 1.8–3.5)
GLUCOSE BLD-MCNC: 122 MG/DL (ref 74–124)
HCT VFR BLD AUTO: 35.8 % (ref 34–45)
HGB BLD-MCNC: 11.7 G/DL (ref 11.5–14.9)
IMM GRANULOCYTES # BLD: 0 10*3/MM3 (ref 0–0.03)
IMM GRANULOCYTES NFR BLD: 0 % (ref 0–0.5)
LYMPHOCYTES # BLD AUTO: 1.04 10*3/MM3 (ref 1–3.5)
LYMPHOCYTES NFR BLD AUTO: 28.9 % (ref 20–49)
MAGNESIUM SERPL-MCNC: 1.9 MG/DL (ref 1.8–2.5)
MCH RBC QN AUTO: 33.5 PG (ref 27–33)
MCHC RBC AUTO-ENTMCNC: 32.7 G/DL (ref 32–35)
MCV RBC AUTO: 102.6 FL (ref 83–97)
MONOCYTES # BLD AUTO: 0.38 10*3/MM3 (ref 0.25–0.8)
MONOCYTES NFR BLD AUTO: 10.6 % (ref 4–12)
NEUTROPHILS # BLD AUTO: 2.02 10*3/MM3 (ref 1.5–7)
NEUTROPHILS NFR BLD AUTO: 56.1 % (ref 39–75)
NRBC BLD MANUAL-RTO: 0 /100 WBC (ref 0–0)
PHOSPHATE SERPL-MCNC: 3.5 MG/DL (ref 2.5–4.5)
PLATELET # BLD AUTO: 216 10*3/MM3 (ref 150–375)
PMV BLD AUTO: 10 FL (ref 8.9–12.1)
POTASSIUM BLD-SCNC: 4.8 MMOL/L (ref 3.5–4.7)
PROT SERPL-MCNC: 6.3 G/DL (ref 6.3–8)
RBC # BLD AUTO: 3.49 10*6/MM3 (ref 3.9–5)
SODIUM BLD-SCNC: 140 MMOL/L (ref 134–145)
WBC NRBC COR # BLD: 3.6 10*3/MM3 (ref 4–10)

## 2018-07-11 PROCEDURE — 80053 COMPREHEN METABOLIC PANEL: CPT | Performed by: INTERNAL MEDICINE

## 2018-07-11 PROCEDURE — 36415 COLL VENOUS BLD VENIPUNCTURE: CPT | Performed by: INTERNAL MEDICINE

## 2018-07-11 PROCEDURE — 96372 THER/PROPH/DIAG INJ SC/IM: CPT | Performed by: INTERNAL MEDICINE

## 2018-07-11 PROCEDURE — 25010000002 DENOSUMAB 120 MG/1.7ML SOLUTION: Performed by: INTERNAL MEDICINE

## 2018-07-11 PROCEDURE — 83735 ASSAY OF MAGNESIUM: CPT | Performed by: INTERNAL MEDICINE

## 2018-07-11 PROCEDURE — 84100 ASSAY OF PHOSPHORUS: CPT | Performed by: INTERNAL MEDICINE

## 2018-07-11 PROCEDURE — 99214 OFFICE O/P EST MOD 30 MIN: CPT | Performed by: INTERNAL MEDICINE

## 2018-07-11 PROCEDURE — 85025 COMPLETE CBC W/AUTO DIFF WBC: CPT | Performed by: INTERNAL MEDICINE

## 2018-07-11 RX ADMIN — DENOSUMAB 120 MG: 120 INJECTION SUBCUTANEOUS at 15:41

## 2018-07-11 NOTE — PROGRESS NOTES
Subjective .     REASONS FOR FOLLOWUP:    1. Stage Ib ([id number]) right breast cancer: Diagnosed May 2010 with excisional biopsy for invasive ductal carcinoma, 1.3 cm, grade 2, ER 90%, VA 80%, HER-2/peter negative (1+ IHC).  Subsequent right mastectomy in July 2010 with no residual breast malignancy, 1/5 sentinel lymph node with micrometastasis (0.25 mm).  Treated in the [facility] system with adjuvant AC ×4 cycles in 2010 (no taxanes administered due to underlying Charcot-Saloni-Tooth with peripheral neuropathy).  Adjuvant Femara (postmenopausal) initiated October 2010 with plan to treat ×10 years.  Genetic testing reportedly negative.  Developed osteopenia treated with Prolia beginning 2/27/13.  2. Recurrent/metastatic disease identified 10/8/17 involving thoracic spine with cord compression at T6, lumbosacral involvement, sternal and right sternoclavicular involvement.  Femara discontinued.  Radiation administered (in the [facility] system) to the thoracic spine beginning 10/19/17 treating T3-T9 to a dose of 24 gray in 6 fractions.  3. Evaluation with MRI 12/8/17 showing persistent T6 cord compression with persistent neurologic compromise requiring surgical treatment 12/11/17 with T6 laminectomy/corpectomy and T3-T9 fusion.  Pathology with metastatic carcinoma of breast origin, ER negative, VA negative, HER-2/peter negative (1+ IHC).  4. Right pulmonary embolism 10/21/17 continuing on Lovenox 1 mg/kg (100 mg) every 12 hours.  No evidence of DVT.  Lovenox held due to right gluteus hematoma 3/23/18 through 4/6/18.  5. Cancer related pain previously on Duragesic 50 µg patch every 72 hours and Dilaudid 4 mg as needed for breakthrough pain.  Narcotics subsequently discontinued with improvement in pain in January 2018.  6. Radiation therapy to L3 dural metastasis and left humerus initiated 1/15/18 (10 fractions), completed 1/26/18  7. Monthly Xgeva initiated 1/23/18  8. Mild hypercalcemia of malignancy  9. Initiation of  palliative oral single agent Xeloda 2/7/18 (2000 mg a.m., 1500 mg p.m. for 14/21 days).  10. 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  In reviewing prior CT scans, has been present for some time.  We will monitor for now, if it enlarges consider surgical excision.    HISTORY OF PRESENT ILLNESS:  The patient is a 57 y.o. year old female who is here for follow-up with the above-mentioned history.    History of Present Illness  the patient returns today now one week into her eighth cycle of Xeloda.  He needed to reschedule her appointment from last week.  She is due for monthly Xgeva today.  The interval, she has continued to tolerate treatment extremely well.  She has some minor hand foot symptoms with darkening of her skin and slight erythema but no pain, no skin peeling.  She is using emollient cream twice per day.  She has some occasional nausea but no emesis and does use Zofran as needed.  Her bowels are occasionally loose however she does not require any treatment.  She denies any oral ulcerations.  She denies any fatigue and reports that she feels better now that she has in quite some time.  Extensively with rehabilitation at [facility] and is making further progress, now able to walk with assistance of a therapist on either side and was able to get into the pool and ambulate.  She feels that her depression has improved and weaned herself off of Cymbalta.  She has also decreased her dosing of gabapentin, currently taking 300 mg twice daily area she does require very occasional dose of Dilaudid 4 mg with her back pain occasionally being aggravated when she is more active.    Past Medical History:   Diagnosis Date   • Acute pulmonary embolism, cancer-related 10/2017   • Allergic rhinitis    • Arthritis     Right knee; left shoulder   • Cancer  (CMS/[facility]) 2010    Right breast   • Cancer (CMS/[facility]) 10/2017    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   • Charcot-Saloni-Tooth disease    • CPAP (continuous positive airway pressure) dependence    • GERD (gastroesophageal reflux disease)    • H/O Colon polyps    • H/O Uterine fibroid    • Heart murmur    • Hyperlipidemia    • Hypertension    • PONV (postoperative nausea and vomiting)      Past Surgical History:   Procedure Laterality Date   • BLADDER SURGERY      Bladder lift   • BREAST RECONSTRUCTION, BREAST TISSUE EXPANDER INSERTION Right    • BREAST SURGERY Right 2010    Mastectomy   •  SECTION  ,    • CHOLECYSTECTOMY     • COLONOSCOPY     • HERNIA REPAIR  2015    [facility]   • HYSTERECTOMY      Partial   • KNEE SURGERY Right    • LEG SURGERY Left     Broken   • MASTECTOMY Right    • SD TREAT TIBIAL SHAFT FX, INTRAMED IMPLANT Left 2017    Procedure: TIBIA INTRAMEDULLARY NAIL/DON INSERTION;  Surgeon: Romero Shanks MD;  Location: [facility];  Service: Orthopedics   • THORACIC DECOMPRESSION POSTERIOR FUSION WITH INSTRUMENTATION N/A 2017    Procedure: T6 costotransversectomy and decompression with T3 to  T9 posterior spinal fusion with instrumentation with Jennifer Gonzalez and Nael Wilkes;  Surgeon: Quan Nayak MD;  Location: [facility];  Service:        ONCOLOGIC HISTORY:  (History from previous dates can be found in the separate document.)    Current Outpatient Prescriptions on File Prior to Visit   Medication Sig Dispense Refill   • acetaminophen (TYLENOL) 500 MG tablet Take 500 mg by mouth Every 6 (Six) Hours As Needed for Mild Pain .     • calcium carbonate (OS-CARY) 600 MG tablet Take 600 mg by mouth 2 (Two) Times a Day With Meals.     • capecitabine (XELODA) 500 MG chemo tablet Take 4 tabs (2000 mg) in the AM then take 3 tabs (1500 mg) in the PM for 14 days on then 7 days off. 98 tablet 3   • cyclobenzaprine (FLEXERIL) 10 MG tablet Take 1 tablet by  mouth 3 (Three) Times a Day As Needed for Muscle Spasms. 30 tablet 3   • enoxaparin (LOVENOX) 80 MG/0.8ML solution syringe INJECT 0.8 ML UNDER THE SKIN Q 12 H  3   • FLONASE ALLERGY RELIEF 50 MCG/ACT nasal spray 2 sprays into each nostril daily.  11   • gabapentin (NEURONTIN) 300 MG capsule Take 1 capsule by mouth 3 (Three) Times a Day. (Patient taking differently: Take 300 mg by mouth 2 (Two) Times a Day.) 90 capsule 2   • HYDROmorphone (DILAUDID) 4 MG tablet Take 1 tablet by mouth Every 4 (Four) Hours As Needed for Moderate Pain . 60 tablet 0   • mesalamine (LIALDA) 1.2 g EC tablet Take 1 tablet by mouth 2 (Two) Times a Day. 180 tablet 1   • omeprazole (PriLOSEC) 40 MG capsule TAKE 1 CAPSULE DAILY 90 capsule 2   • ondansetron ODT (ZOFRAN-ODT) 8 MG disintegrating tablet Take 1 tablet by mouth Every 8 (Eight) Hours As Needed for Nausea or Vomiting. 30 tablet 1   • sennosides-docusate sodium (SENOKOT-S) 8.6-50 MG tablet Take 2 tablets by mouth 2 (Two) Times a Day. 90 tablet 2   • traMADol (ULTRAM) 50 MG tablet Take 1 tablet by mouth Every 8 (Eight) Hours As Needed for Moderate Pain . 30 tablet 0   • trimethoprim (TRIMPEX) 100 MG tablet Take 100 mg by mouth Daily.  2   • Tuberculin PPD (APLISOL ID) Inject  into the skin.     • Unable to find SWISH AND SPIT 5 ML PO Q 2 H PRN  0   • [DISCONTINUED] glycerin (ADULT) 2 g suppository rectal suppository Insert 2 g into the rectum.     • [DISCONTINUED] lactulose (CHRONULAC) 10 GM/15ML solution Take 30 mL by mouth 2 (Two) Times a Day. 1892 mL 2   • [DISCONTINUED] minoxidil (ROGAINE) 2 % external solution Apply  topically Daily. 120 mL 1   • [DISCONTINUED] nystatin (MYCOSTATIN) 687665 UNIT/GM ointment Apply  topically 2 (Two) Times a Day.     • [DISCONTINUED] nystatin (nystatin) 554946 UNIT/GM powder Apply  topically 4 (Four) Times a Day.     • [DISCONTINUED] phenazopyridine (PYRIDIUM) 200 MG tablet Take 1 tablet by mouth 3 (Three) Times a Day As Needed for bladder spasms. 15  tablet 2   • [DISCONTINUED] polyethylene glycol (MIRALAX) packet Take 17 g by mouth Daily.     • [DISCONTINUED] psyllium (METAMUCIL) 58.6 % packet Take 1 packet by mouth Daily.     • [DISCONTINUED] saccharomyces boulardii (FLORASTOR) 250 MG capsule Take 250 mg by mouth 2 (Two) Times a Day.     • [DISCONTINUED] DULoxetine (CYMBALTA) 30 MG capsule Take 1 capsule by mouth Daily. 30 capsule 5     No current facility-administered medications on file prior to visit.        ALLERGIES:     Allergies   Allergen Reactions   • Hydrocodone Nausea Only   • Morphine And Related Nausea And Vomiting     Social History     Social History   • Marital status:      Spouse name: Murray   • Number of children: 3   • Years of education: College     Occupational History   •  [employer]   •  Retired     Social History Main Topics   • Smoking status: Never Smoker   • Smokeless tobacco: Never Used   • Alcohol use Yes      Comment: social   • Drug use: No   • Sexual activity: Defer     Other Topics Concern   • Not on file     Social History Narrative   • No narrative on file     Family History   Problem Relation Age of Onset   • Heart disease Mother    • Hyperlipidemia Mother    • Hypertension Mother    • Diabetes Father    • Charcot-Saloni-Tooth disease Father    • Heart disease Father    • Hypertension Father    • Heart failure Father    • Diabetes Sister    • Heart disease Sister    • Hypertension Sister    • Heart disease Maternal Aunt    • Scoliosis Maternal Aunt    • Heart disease Maternal Uncle    • Diabetes Maternal Grandmother    • Heart disease Maternal Grandmother    • Hypertension Maternal Grandmother    • Uterine cancer Maternal Grandmother    • Heart disease Maternal Grandfather      Review of Systems   Constitutional: Negative for activity change, appetite change, fatigue, fever and unexpected weight change.   HENT: Negative for congestion, mouth sores, nosebleeds, sore throat and voice change.    Respiratory: Negative for  cough, shortness of breath and wheezing.    Cardiovascular: Negative for chest pain, palpitations and leg swelling.   Gastrointestinal: Negative for abdominal distention, abdominal pain, blood in stool, constipation, diarrhea, nausea and vomiting.   Endocrine: Negative for cold intolerance and heat intolerance.   Genitourinary: Negative for difficulty urinating, dysuria, frequency and hematuria.   Musculoskeletal: Positive for back pain. Negative for arthralgias, joint swelling and myalgias.   Skin: Negative for rash.   Neurological: Positive for weakness. Negative for dizziness, syncope, light-headedness, numbness and headaches.   Hematological: Negative for adenopathy. Does not bruise/bleed easily.   Psychiatric/Behavioral: Negative for confusion and sleep disturbance. The patient is not nervous/anxious.          Objective      Vitals:    07/11/18 1502   BP: 106/64   Pulse: 94   Resp: 14   Temp: 98.6 °F (37 °C)   SpO2: 97%      Current Status 7/11/2018   ECOG score 3   Pain 2/10    Physical Exam   Constitutional: She is oriented to person, place, and time. She appears well-developed and well-nourished.   The patient is currently seated in a wheelchair in no distress.   HENT:   Mouth/Throat: Oropharynx is clear and moist.   Eyes: Conjunctivae are normal.   Neck: No thyromegaly present.   Cardiovascular: Normal rate and regular rhythm.  Exam reveals no gallop and no friction rub.    No murmur heard.  Pulmonary/Chest: Breath sounds normal. No respiratory distress.   Abdominal: Soft. Bowel sounds are normal. She exhibits no distension. There is no tenderness.   Musculoskeletal: She exhibits edema.   Lower extremity braces in place.  Trace bilateral lower extremity edema.   Lymphadenopathy:        Head (right side): No submandibular adenopathy present.     She has no cervical adenopathy.     She has no axillary adenopathy.        Right: No inguinal and no supraclavicular adenopathy present.        Left: No inguinal and  no supraclavicular adenopathy present.   Neurological: She is alert and oriented to person, place, and time. No cranial nerve deficit.   Bilateral lower extremity strength, 4+/5   Skin: Skin is warm and dry. No rash noted.   Slight brownish discoloration the palms of hands, no erythema nor skin peeling.   Psychiatric: She has a normal mood and affect. Her behavior is normal.       RECENT LABS:  Hematology WBC   Date Value Ref Range Status   07/11/2018 3.60 (L) 4.00 - 10.00 10*3/mm3 Final     RBC   Date Value Ref Range Status   07/11/2018 3.49 (L) 3.90 - 5.00 10*6/mm3 Final     Hemoglobin   Date Value Ref Range Status   07/11/2018 11.7 11.5 - 14.9 g/dL Final     Hematocrit   Date Value Ref Range Status   07/11/2018 35.8 34.0 - 45.0 % Final     Platelets   Date Value Ref Range Status   07/11/2018 216 150 - 375 10*3/mm3 Final        Lab Results   Component Value Date    GLUCOSE 102 06/13/2018    BUN 20 06/13/2018    CREATININE 0.87 06/13/2018    EGFRIFNONA 67 06/13/2018    EGFRIFAFRI 80 09/25/2017    BCR 23.0 06/13/2018    K 4.5 06/13/2018    CO2 26.5 06/13/2018    CALCIUM 9.0 06/13/2018    PROTENTOTREF 6.4 09/25/2017    ALBUMIN 3.80 06/13/2018    LABIL2 1.5 06/13/2018    AST 18 06/13/2018    ALT 17 06/13/2018          Assessment/Plan      1. Previous Stage Ib ([id number]) right breast cancer:  · Diagnosed May 2010 with excisional biopsy for invasive ductal carcinoma, 1.3 cm, grade 2, ER 90%, TX 80%, HER-2/peter negative (1+ IHC).    · Subsequent right mastectomy in July 2010 with no residual breast malignancy, 1/5 sentinel lymph node with micrometastasis (0.25 mm).    · Treated in the [facility] system with adjuvant AC ×4 cycles in 2010 (no taxanes administered due to underlying Charcot-Saloni-Tooth with peripheral neuropathy).    · Adjuvant Femara (postmenopausal) initiated October 2010 with plan to treat ×10 years.    · Genetic testing reportedly negative.    · Developed osteopenia treated with Prolia beginning 2/27/13.  Subsequently discontinued due to identification of metastatic disease.  2. Recurrent/metastatic disease identified 10/8/17:  · Disease involving thoracic spine with cord compression at T6, lumbosacral involvement, sternal and right sternoclavicular involvement.    · Femara discontinued in 10/2017.    · Radiation administered (in the [facility] system) to the thoracic spine beginning 10/19/17 treating T3-T9 to a dose of 24 gray in 6 fractions.  · Evaluation with MRI 12/8/17 showing persistent T6 cord compression with persistent neurologic compromise requiring surgical treatment 12/11/17 with T6 laminectomy/corpectomy and T3-T9 fusion.  Pathology with metastatic carcinoma of breast origin, ER negative, ID negative, HER-2/peter negative (1+ IHC).  · Additional staging evaluation 12/8/17 with no evidence of visceral metastatic disease, bone scan showing involvement of thoracic spine, sternum, left humerus, mid frontal bone.  No plane film correlate of left humerus lesion.  MRI lumbar spine with small intradural L3 metastasis.  CA 15-3 12/6/17- 17.  · Palliative radiation therapy to L3 dural metastasis and left humerus initiated 1/15/18 (10 fractions), completed 1/26/18.  · Hypercalcemia of malignancy with calcium in the 10-11 range.  · Initiation of monthly Xgeva 1/23/18.  · Baseline CT scan 1/30/18 with no evidence of visceral involvement.  Cluster of nodular opacities in the right lower lobe suspected to be infectious or related to bronchiolitis. Bone scan 1/30/18 showed postsurgical change in the thoracic spine, stable uptake in the frontal bone, no new areas of disease.  · Initiation of palliative oral single agent Xeloda 2/7/18 2000 mg a.m., 1500 mg p.m. for 14/21 days.   · Following 3 cycles xeloda, bone scan 4/4/18 showed no change from the prior study.  CT scan 4/4/18 showed a small pericardial effusion of unclear significance as well as a subcutaneous nodule in the right lateral chest wall.  Subsequent evaluation  with echocardiogram 4/17/18 showed no evidence of pericardial effusion.  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  We discussed the possibility of surgical excision of the right subcutaneous chest wall lesion for more definitive diagnosis.  Reviewed previous CT images dating back to 12/8/17 and the lesion was present even at that time measuring around 1.7 cm although not commented on in the radiology report.  As this appears to be an indolent low-grade process unrelated to her breast cancer, recommendee foregoing surgical excision at this time and monitoring this area on future scans.  The patient agreed.    · Following 6 cycles of Xeloda, CT 6/6/18  showed stable findings, no evidence of progressive disease.  There was a comment regarding subcutaneous abnormality in the anterior abdominal wall and this was related to Lovenox injection sites.  Bone scan 6/6/18 showed no interval change.   · The patient today is now one week into her eighth cycle of Xeloda, continuing with 2000 mg in the morning, 1500 mg in the evening for 14/21 days.  She had to delay her appointment from last week due to scheduling conflicts.  Overall, she continues to tolerate treatment extremely well, has some minor nausea and occasional loose stool.  She has some minimal changes in her hands with darkened pigmentation however no skin peeling is noted.  She has similar changes reported in her feet.  We will continue on with this cycle.  She will receive monthly Xgeva today.  In 2 weeks she will return for a nurse practitioner visit at the beginning of cycle 9 with CEA and CMP.  4 weeks she will also receive Xgeva.  4 weeks she will undergo CT chest abdomen pelvis and bone scan.  I will see her in 5 weeks for follow-up when she is due to begin cycle 10 Xeloda pending scan results.  3. Right hip/gluteal  hematoma:  · The patient had developed considerable pain in the right hip and underwent MRI 3/22/18 visit showed a 5.7 x 4.3 x 11 cm hematoma in the right gluteal region.  · Lovenox was held 3/23 through 4/6/18.  Resolution of pain, Lovenox was resumed. Hematoma likely occurred due to minimal trauma from transfer out of her wheelchair.   4. Right pulmonary embolism:  · Diagnosed on CT angiogram 10/21/17 involving small right lower lobe pulmonary artery.  Lower extremity Dopplers negative.  · Bilateral lower extremity Dopplers negative again 12/5/17.  · Continuing on Lovenox 1 mg/kg (80 mg) subcutaneous injection every 12 hours.  Lovenox was held for 2 weeks due to right hip hematoma seen on MRI, treatment resumed on 4/6/18 with no further obvious bleeding issues.  5. Cancer related pain:  · Previously receiving Duragesic 50 µg patch every 72 hours along with Dilaudid 4 mg as needed for breakthrough pain  · The patient's pain improved over time and she was able to discontinue both Duragesic and Dilaudid in the interval.  · Patient does take occasional Flexeril at bedtime due to back spasm/pain when she has been more active.  · Right hip pain as noted above due to right gluteal hematoma, previously prescribed Dilaudid 4 mg every 4 hours as needed #60 with no refill.  Pain from hematoma resolved.  · The patient does have some occasional aggravation of her chronic back pain with more significant rehabilitation activity and requires an occasional dose of Dilaudid.  6. Chemotherapy-induced diarrhea:  · The patient reports some loose stools that occur during her week off of Xeloda for which she takes an occasional Imodium with excellent control  7. Traumatic left tibia/fibular fracture:  · Status post ORIF 12/6/17  · Specimen was sent for pathologic review, negative for evidence of malignancy  8. Hypercalcemia:  · Suspect hypercalcemia of malignancy, calcium in  10-11 range previously.  · Calcium normalized following  initiation of monthly Xgeva on 1/23/18.  9. Chemotherapy-induced mucositis:  · Patient had a minimal degree of mucositis with cycle 2.  The patient has magic mouthwash to use as needed.  No subsequent mucositis.  10. Recurrent UTI, bladder wall thickening on CT:  · Patient had an enterococcal UTI on 3/2/18 sensitive to nitrofurantoin and received treatment, unclear how long.  · Recurrent UTI 3/20/18 with urine culture growing Klebsiella, initially treated with nitrofurantoin, transitioned to Levaquin.  · CT 4/4/18 with diffuse bladder wall thickening with increased nodular thickening at the left base.  Referral to urogynecology Dr. May Johnson.  She was placed on a prophylactic dose of trimethoprim 100 mg daily, bladder wall thickening felt to be related to recent recurrent urinary tract infections.  There are no plans for cystoscopy currently.  The patient remains asymptomatic in this regard.  11.   Mobility:  · The patient continues with aggressive outpatient physical therapy at [facility]. She continues to improve considerably and is now able to walk with assistance.  12.  Depression:  · The patient weaned herself off of Cymbalta recently and reports that her symptoms are stable.    Plan:  1. Continue cycle 8 Xeloda (2000 mg a.m., 1500 mg p.m. for 14/21 days), currently one week into cycle.  2. Monthly Xgeva today  3. Continue Lovenox 80 mg every 12 hours.  4. Continue outpatient physical therapy at [facility]  5. In 2 weeks nurse practitioner visit with CBC, CMP and begin cycle 9 Xeloda  6. In 4 weeks, CBC, CMP, magnesium, phosphorus and monthly Xgeva  7. In 4 weeks CT chest abdomen and pelvis and bone scan  8. In 5 weeks M.D. visit CBC, CMP.  The patient will be due to begin cycle 10 Xeloda pending scan results.

## 2018-07-25 ENCOUNTER — DOCUMENTATION (OUTPATIENT)
Dept: ONCOLOGY | Facility: CLINIC | Age: 58
End: 2018-07-25

## 2018-07-30 ENCOUNTER — OFFICE VISIT (OUTPATIENT)
Dept: ONCOLOGY | Facility: CLINIC | Age: 58
End: 2018-07-30

## 2018-07-30 ENCOUNTER — LAB (OUTPATIENT)
Dept: OTHER | Facility: HOSPITAL | Age: 58
End: 2018-07-30

## 2018-07-30 VITALS
RESPIRATION RATE: 18 BRPM | OXYGEN SATURATION: 98 % | DIASTOLIC BLOOD PRESSURE: 88 MMHG | HEART RATE: 102 BPM | TEMPERATURE: 98.1 F | HEIGHT: 61 IN | SYSTOLIC BLOOD PRESSURE: 140 MMHG

## 2018-07-30 DIAGNOSIS — C50.811 MALIGNANT NEOPLASM OF OVERLAPPING SITES OF RIGHT BREAST IN FEMALE, ESTROGEN RECEPTOR NEGATIVE (HCC): ICD-10-CM

## 2018-07-30 DIAGNOSIS — Z17.1 MALIGNANT NEOPLASM OF OVERLAPPING SITES OF RIGHT BREAST IN FEMALE, ESTROGEN RECEPTOR NEGATIVE (HCC): ICD-10-CM

## 2018-07-30 DIAGNOSIS — Z17.1 MALIGNANT NEOPLASM OF OVERLAPPING SITES OF RIGHT BREAST IN FEMALE, ESTROGEN RECEPTOR NEGATIVE (HCC): Primary | ICD-10-CM

## 2018-07-30 DIAGNOSIS — C50.811 MALIGNANT NEOPLASM OF OVERLAPPING SITES OF RIGHT BREAST IN FEMALE, ESTROGEN RECEPTOR NEGATIVE (HCC): Primary | ICD-10-CM

## 2018-07-30 DIAGNOSIS — H10.32 ACUTE CONJUNCTIVITIS OF LEFT EYE, UNSPECIFIED ACUTE CONJUNCTIVITIS TYPE: ICD-10-CM

## 2018-07-30 LAB
ALBUMIN SERPL-MCNC: 4.5 G/DL (ref 3.5–5.2)
ALBUMIN/GLOB SERPL: 1.7 G/DL
ALP SERPL-CCNC: 70 U/L (ref 39–117)
ALT SERPL W P-5'-P-CCNC: 64 U/L (ref 1–33)
ANION GAP SERPL CALCULATED.3IONS-SCNC: 11.8 MMOL/L
AST SERPL-CCNC: 46 U/L (ref 1–32)
BASOPHILS # BLD AUTO: 0.04 10*3/MM3 (ref 0–0.2)
BASOPHILS NFR BLD AUTO: 0.9 % (ref 0–1.5)
BILIRUB SERPL-MCNC: 0.4 MG/DL (ref 0.1–1.2)
BUN BLD-MCNC: 20 MG/DL (ref 6–20)
BUN/CREAT SERPL: 23 (ref 7–25)
CALCIUM SPEC-SCNC: 9.7 MG/DL (ref 8.6–10.5)
CHLORIDE SERPL-SCNC: 103 MMOL/L (ref 98–107)
CO2 SERPL-SCNC: 27.2 MMOL/L (ref 22–29)
CREAT BLD-MCNC: 0.87 MG/DL (ref 0.57–1)
DEPRECATED RDW RBC AUTO: 70 FL (ref 37–54)
EOSINOPHIL # BLD AUTO: 0.15 10*3/MM3 (ref 0–0.7)
EOSINOPHIL NFR BLD AUTO: 3.3 % (ref 0.3–6.2)
ERYTHROCYTE [DISTWIDTH] IN BLOOD BY AUTOMATED COUNT: 18.6 % (ref 11.7–13)
GFR SERPL CREATININE-BSD FRML MDRD: 67 ML/MIN/1.73
GLOBULIN UR ELPH-MCNC: 2.6 GM/DL
GLUCOSE BLD-MCNC: 117 MG/DL (ref 65–99)
HCT VFR BLD AUTO: 38.5 % (ref 35.6–45.5)
HGB BLD-MCNC: 12.6 G/DL (ref 11.9–15.5)
IMM GRANULOCYTES # BLD: 0.01 10*3/MM3 (ref 0–0.03)
IMM GRANULOCYTES NFR BLD: 0.2 % (ref 0–0.5)
LYMPHOCYTES # BLD AUTO: 0.93 10*3/MM3 (ref 0.9–4.8)
LYMPHOCYTES NFR BLD AUTO: 20.2 % (ref 19.6–45.3)
MAGNESIUM SERPL-MCNC: 1.9 MG/DL (ref 1.6–2.6)
MCH RBC QN AUTO: 33.3 PG (ref 26.9–32)
MCHC RBC AUTO-ENTMCNC: 32.7 G/DL (ref 32.4–36.3)
MCV RBC AUTO: 101.9 FL (ref 80.5–98.2)
MONOCYTES # BLD AUTO: 0.46 10*3/MM3 (ref 0.2–1.2)
MONOCYTES NFR BLD AUTO: 10 % (ref 5–12)
NEUTROPHILS # BLD AUTO: 3.01 10*3/MM3 (ref 1.9–8.1)
NEUTROPHILS NFR BLD AUTO: 65.4 % (ref 42.7–76)
NRBC BLD MANUAL-RTO: 0 /100 WBC (ref 0–0)
PHOSPHATE SERPL-MCNC: 3.2 MG/DL (ref 2.5–4.5)
PLATELET # BLD AUTO: 221 10*3/MM3 (ref 140–500)
PMV BLD AUTO: 10.6 FL (ref 6–12)
POTASSIUM BLD-SCNC: 4.8 MMOL/L (ref 3.5–5.2)
PROT SERPL-MCNC: 7.1 G/DL (ref 6–8.5)
RBC # BLD AUTO: 3.78 10*6/MM3 (ref 3.9–5.2)
SODIUM BLD-SCNC: 142 MMOL/L (ref 136–145)
WBC NRBC COR # BLD: 4.6 10*3/MM3 (ref 4.5–10.7)

## 2018-07-30 PROCEDURE — 83735 ASSAY OF MAGNESIUM: CPT | Performed by: INTERNAL MEDICINE

## 2018-07-30 PROCEDURE — 80053 COMPREHEN METABOLIC PANEL: CPT | Performed by: INTERNAL MEDICINE

## 2018-07-30 PROCEDURE — 85025 COMPLETE CBC W/AUTO DIFF WBC: CPT | Performed by: INTERNAL MEDICINE

## 2018-07-30 PROCEDURE — 84100 ASSAY OF PHOSPHORUS: CPT | Performed by: INTERNAL MEDICINE

## 2018-07-30 PROCEDURE — 99214 OFFICE O/P EST MOD 30 MIN: CPT | Performed by: NURSE PRACTITIONER

## 2018-07-30 PROCEDURE — 36415 COLL VENOUS BLD VENIPUNCTURE: CPT

## 2018-07-30 RX ORDER — POLYMYXIN B SULFATE AND TRIMETHOPRIM 1; 10000 MG/ML; [USP'U]/ML
1 SOLUTION OPHTHALMIC EVERY 4 HOURS
Qty: 10 ML | Refills: 0 | Status: SHIPPED | OUTPATIENT
Start: 2018-07-30 | End: 2018-08-04

## 2018-07-30 NOTE — PROGRESS NOTES
Subjective .     REASONS FOR FOLLOWUP:    1. Stage Ib ([id number]) right breast cancer: Diagnosed May 2010 with excisional biopsy for invasive ductal carcinoma, 1.3 cm, grade 2, ER 90%, AL 80%, HER-2/peter negative (1+ IHC).  Subsequent right mastectomy in July 2010 with no residual breast malignancy, 1/5 sentinel lymph node with micrometastasis (0.25 mm).  Treated in the [facility] system with adjuvant AC ×4 cycles in 2010 (no taxanes administered due to underlying Charcot-Saloni-Tooth with peripheral neuropathy).  Adjuvant Femara (postmenopausal) initiated October 2010 with plan to treat ×10 years.  Genetic testing reportedly negative.  Developed osteopenia treated with Prolia beginning 2/27/13.  2. Recurrent/metastatic disease identified 10/8/17 involving thoracic spine with cord compression at T6, lumbosacral involvement, sternal and right sternoclavicular involvement.  Femara discontinued.  Radiation administered (in the [facility] system) to the thoracic spine beginning 10/19/17 treating T3-T9 to a dose of 24 gray in 6 fractions.  3. Evaluation with MRI 12/8/17 showing persistent T6 cord compression with persistent neurologic compromise requiring surgical treatment 12/11/17 with T6 laminectomy/corpectomy and T3-T9 fusion.  Pathology with metastatic carcinoma of breast origin, ER negative, AL negative, HER-2/peter negative (1+ IHC).  4. Right pulmonary embolism 10/21/17 continuing on Lovenox 1 mg/kg (100 mg) every 12 hours.  No evidence of DVT.  Lovenox held due to right gluteus hematoma 3/23/18 through 4/6/18.  5. Cancer related pain previously on Duragesic 50 µg patch every 72 hours and Dilaudid 4 mg as needed for breakthrough pain.  Narcotics subsequently discontinued with improvement in pain in January 2018.  6. Radiation therapy to L3 dural metastasis and left humerus initiated 1/15/18 (10 fractions), completed 1/26/18  7. Monthly Xgeva initiated 1/23/18  8. Mild hypercalcemia of malignancy  9. Initiation of  palliative oral single agent Xeloda 2/7/18 (2000 mg a.m., 1500 mg p.m. for 14/21 days).  10. 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  In reviewing prior CT scans, has been present for some time.  We will monitor for now, if it enlarges consider surgical excision.    HISTORY OF PRESENT ILLNESS:  The patient is a 57 y.o. year old female who is here for follow-up with the above-mentioned history.    History of Present Illness  The patient is a 57 year old female with the above mentioned history, here today for toxicity check. She continues on Xeloda, 2000 mg in the morning, and 1500 mg in the evening 14 days on and 7 days off.  She just started her 9th cycle this past Wednesday July 25th.  She feels like she is tolerating medication well.  She does have intermittent issues with constipation and diarrhea.  These are both controlled with medication.  She denies any skin rash, or mouth sores.    She does complain of some redness and discharge from her left eye.  She states that this started about 4 days ago.  Her left eye started watering, and has become slightly swollen.  She has had drainage, and her left eye has been matted chat when she wakes up in the morning.  Her  had pink eye about a month ago.  She started using his eyedrops.    The patient continues rehabilitation at [facility] 2 times per week.    Patient also reports she was found recently to have a urinary tract infection, which she thinks developed after being cathed at her urologist office.  She was placed on antibiotics, and has completed her course.  Dr. Johnson is planning a cystoscopy scheduled for 8/22.  She denies fevers or chills, or any other urologic symptoms.    Past Medical History:   Diagnosis Date   • Acute pulmonary embolism, cancer-related 10/2017   •  Allergic rhinitis    • Arthritis     Right knee; left shoulder   • Cancer (CMS/Formerly [facility]) 2010    Right breast   • Cancer (CMS/Formerly [facility]) 10/2017    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   • Charcot-Saloni-Tooth disease    • CPAP (continuous positive airway pressure) dependence    • GERD (gastroesophageal reflux disease)    • H/O Colon polyps    • H/O Uterine fibroid    • Heart murmur    • Hyperlipidemia    • Hypertension    • PONV (postoperative nausea and vomiting)      Past Surgical History:   Procedure Laterality Date   • BLADDER SURGERY      Bladder lift   • BREAST RECONSTRUCTION, BREAST TISSUE EXPANDER INSERTION Right    • BREAST SURGERY Right 2010    Mastectomy   •  SECTION  ,    • CHOLECYSTECTOMY     • COLONOSCOPY     • HERNIA REPAIR  2015    [facility]   • HYSTERECTOMY      Partial   • KNEE SURGERY Right    • LEG SURGERY Left     Broken   • MASTECTOMY Right    • MI TREAT TIBIAL SHAFT FX, INTRAMED IMPLANT Left 2017    Procedure: TIBIA INTRAMEDULLARY NAIL/DON INSERTION;  Surgeon: Romero Shanks MD;  Location: [facility];  Service: Orthopedics   • THORACIC DECOMPRESSION POSTERIOR FUSION WITH INSTRUMENTATION N/A 2017    Procedure: T6 costotransversectomy and decompression with T3 to  T9 posterior spinal fusion with instrumentation with Jennfier Gonzalez and Nael Dennis [facility];  Surgeon: Quan Nayak MD;  Location: [facility];  Service:        ONCOLOGIC HISTORY:  (History from previous dates can be found in the separate document.)    Current Outpatient Prescriptions on File Prior to Visit   Medication Sig Dispense Refill   • acetaminophen (TYLENOL) 500 MG tablet Take 500 mg by mouth Every 6 (Six) Hours As Needed for Mild Pain .     • calcium carbonate (OS-CARY) 600 MG tablet Take 600 mg by mouth 2 (Two) Times a Day With Meals.     • capecitabine (XELODA) 500 MG chemo tablet Take 4 tabs (2000 mg) in the AM then take 3 tabs (1500 mg) in the PM for 14 days on then 7 days off.  98 tablet 3   • cyclobenzaprine (FLEXERIL) 10 MG tablet Take 1 tablet by mouth 3 (Three) Times a Day As Needed for Muscle Spasms. 30 tablet 3   • enoxaparin (LOVENOX) 80 MG/0.8ML solution syringe INJECT 0.8 ML UNDER THE SKIN Q 12 H  3   • FLONASE ALLERGY RELIEF 50 MCG/ACT nasal spray 2 sprays into each nostril daily.  11   • gabapentin (NEURONTIN) 300 MG capsule Take 1 capsule by mouth 3 (Three) Times a Day. (Patient taking differently: Take 300 mg by mouth 2 (Two) Times a Day.) 90 capsule 2   • HYDROmorphone (DILAUDID) 4 MG tablet Take 1 tablet by mouth Every 4 (Four) Hours As Needed for Moderate Pain . 60 tablet 0   • mesalamine (LIALDA) 1.2 g EC tablet Take 1 tablet by mouth 2 (Two) Times a Day. 180 tablet 1   • omeprazole (PriLOSEC) 40 MG capsule TAKE 1 CAPSULE DAILY 90 capsule 2   • ondansetron ODT (ZOFRAN-ODT) 8 MG disintegrating tablet Take 1 tablet by mouth Every 8 (Eight) Hours As Needed for Nausea or Vomiting. 30 tablet 1   • sennosides-docusate sodium (SENOKOT-S) 8.6-50 MG tablet Take 2 tablets by mouth 2 (Two) Times a Day. 90 tablet 2   • traMADol (ULTRAM) 50 MG tablet Take 1 tablet by mouth Every 8 (Eight) Hours As Needed for Moderate Pain . 30 tablet 0   • trimethoprim (TRIMPEX) 100 MG tablet Take 100 mg by mouth Daily.  2   • Tuberculin PPD (APLISOL ID) Inject  into the skin.     • Unable to find SWISH AND SPIT 5 ML PO Q 2 H PRN  0     No current facility-administered medications on file prior to visit.        ALLERGIES:     Allergies   Allergen Reactions   • Hydrocodone Nausea Only   • Morphine And Related Nausea And Vomiting     Social History     Social History   • Marital status:      Spouse name: Murray   • Number of children: 3   • Years of education: College     Occupational History   •  [employer]   •  Retired     Social History Main Topics   • Smoking status: Never Smoker   • Smokeless tobacco: Never Used   • Alcohol use Yes      Comment: social   • Drug use: No   • Sexual activity:  Defer     Other Topics Concern   • Not on file     Social History Narrative   • No narrative on file     Family History   Problem Relation Age of Onset   • Heart disease Mother    • Hyperlipidemia Mother    • Hypertension Mother    • Diabetes Father    • Charcot-Saloni-Tooth disease Father    • Heart disease Father    • Hypertension Father    • Heart failure Father    • Diabetes Sister    • Heart disease Sister    • Hypertension Sister    • Heart disease Maternal Aunt    • Scoliosis Maternal Aunt    • Heart disease Maternal Uncle    • Diabetes Maternal Grandmother    • Heart disease Maternal Grandmother    • Hypertension Maternal Grandmother    • Uterine cancer Maternal Grandmother    • Heart disease Maternal Grandfather      Review of Systems   Constitutional: Negative for activity change, appetite change, fatigue, fever and unexpected weight change.   HENT: Negative for congestion, mouth sores, nosebleeds, sore throat and voice change.    Eyes: Positive for discharge and redness.   Respiratory: Negative for cough, shortness of breath and wheezing.    Cardiovascular: Negative for chest pain, palpitations and leg swelling.   Gastrointestinal: Negative for abdominal distention, abdominal pain, blood in stool, constipation, diarrhea, nausea and vomiting.   Endocrine: Negative for cold intolerance and heat intolerance.   Genitourinary: Negative for difficulty urinating, dysuria, frequency and hematuria.   Musculoskeletal: Positive for back pain. Negative for arthralgias, joint swelling and myalgias.   Skin: Negative for rash.   Neurological: Positive for weakness. Negative for dizziness, syncope, light-headedness, numbness and headaches.   Hematological: Negative for adenopathy. Does not bruise/bleed easily.   Psychiatric/Behavioral: Negative for confusion and sleep disturbance. The patient is not nervous/anxious.          Objective      Vitals:    07/30/18 1420   BP: 140/88   Pulse: 102   Resp: 18   Temp: 98.1 °F (36.7  °C)   SpO2: 98%      Current Status 7/30/2018   ECOG score 1   Pain 2/10    Physical Exam   Constitutional: She is oriented to person, place, and time. She appears well-developed and well-nourished.   Walking with a walker, accompanied by her .   HENT:   Mouth/Throat: Oropharynx is clear and moist.   Eyes: Right eye exhibits no discharge and no exudate. Left eye exhibits discharge. Left conjunctiva is injected.   Mild periorbital edema.   Neck: No thyromegaly present.   Cardiovascular: Normal rate, regular rhythm and normal heart sounds.    No murmur heard.  Pulmonary/Chest: Breath sounds normal. No respiratory distress.   Abdominal: Soft. Bowel sounds are normal. She exhibits no distension. There is no tenderness.   Musculoskeletal: She exhibits edema (trace bilateral).   Lower extremity braces in place.    Neurological: She is alert and oriented to person, place, and time. No cranial nerve deficit.   Skin: Skin is warm and dry. No rash noted.   Slight brownish discoloration the palms of hands, no erythema nor skin peeling.   Psychiatric: She has a normal mood and affect. Her behavior is normal.       RECENT LABS:  Hematology WBC   Date Value Ref Range Status   07/30/2018 4.60 4.50 - 10.70 10*3/mm3 Final     RBC   Date Value Ref Range Status   07/30/2018 3.78 (L) 3.90 - 5.20 10*6/mm3 Final     Hemoglobin   Date Value Ref Range Status   07/30/2018 12.6 11.9 - 15.5 g/dL Final     Hematocrit   Date Value Ref Range Status   07/30/2018 38.5 35.6 - 45.5 % Final     Platelets   Date Value Ref Range Status   07/30/2018 221 140 - 500 10*3/mm3 Final        Lab Results   Component Value Date    GLUCOSE 117 (H) 07/30/2018    BUN 20 07/30/2018    CREATININE 0.87 07/30/2018    EGFRIFNONA 67 07/30/2018    EGFRIFAFRI 80 09/25/2017    BCR 23.0 07/30/2018    K 4.8 07/30/2018    CO2 27.2 07/30/2018    CALCIUM 9.7 07/30/2018    PROTENTOTREF 6.4 09/25/2017    ALBUMIN 4.50 07/30/2018    LABIL2 2.2 09/25/2017    AST 46 (H)  07/30/2018    ALT 64 (H) 07/30/2018          Assessment/Plan      1. Previous Stage Ib ([id number]) right breast cancer:  · Diagnosed May 2010 with excisional biopsy for invasive ductal carcinoma, 1.3 cm, grade 2, ER 90%, GA 80%, HER-2/peter negative (1+ IHC).    · Subsequent right mastectomy in July 2010 with no residual breast malignancy, 1/5 sentinel lymph node with micrometastasis (0.25 mm).    · Treated in the [facility] system with adjuvant AC ×4 cycles in 2010 (no taxanes administered due to underlying Charcot-Saloni-Tooth with peripheral neuropathy).    · Adjuvant Femara (postmenopausal) initiated October 2010 with plan to treat ×10 years.    · Genetic testing reportedly negative.    · Developed osteopenia treated with Prolia beginning 2/27/13. Subsequently discontinued due to identification of metastatic disease.  2. Recurrent/metastatic disease identified 10/8/17:  · Disease involving thoracic spine with cord compression at T6, lumbosacral involvement, sternal and right sternoclavicular involvement.    · Femara discontinued in 10/2017.    · Radiation administered (in the [facility] system) to the thoracic spine beginning 10/19/17 treating T3-T9 to a dose of 24 gray in 6 fractions.  · Evaluation with MRI 12/8/17 showing persistent T6 cord compression with persistent neurologic compromise requiring surgical treatment 12/11/17 with T6 laminectomy/corpectomy and T3-T9 fusion.  Pathology with metastatic carcinoma of breast origin, ER negative, GA negative, HER-2/peter negative (1+ IHC).  · Additional staging evaluation 12/8/17 with no evidence of visceral metastatic disease, bone scan showing involvement of thoracic spine, sternum, left humerus, mid frontal bone.  No plane film correlate of left humerus lesion.  MRI lumbar spine with small intradural L3 metastasis.  CA 15-3 12/6/17- 17.  · Palliative radiation therapy to L3 dural metastasis and left humerus initiated 1/15/18 (10 fractions), completed  1/26/18.  · Hypercalcemia of malignancy with calcium in the 10-11 range.  · Initiation of monthly Xgeva 1/23/18.  · Baseline CT scan 1/30/18 with no evidence of visceral involvement.  Cluster of nodular opacities in the right lower lobe suspected to be infectious or related to bronchiolitis. Bone scan 1/30/18 showed postsurgical change in the thoracic spine, stable uptake in the frontal bone, no new areas of disease.  · Initiation of palliative oral single agent Xeloda 2/7/18 2000 mg a.m., 1500 mg p.m. for 14/21 days.   · Following 3 cycles xeloda, bone scan 4/4/18 showed no change from the prior study.  CT scan 4/4/18 showed a small pericardial effusion of unclear significance as well as a subcutaneous nodule in the right lateral chest wall.  Subsequent evaluation with echocardiogram 4/17/18 showed no evidence of pericardial effusion.  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  We discussed the possibility of surgical excision of the right subcutaneous chest wall lesion for more definitive diagnosis.  Reviewed previous CT images dating back to 12/8/17 and the lesion was present even at that time measuring around 1.7 cm although not commented on in the radiology report.  As this appears to be an indolent low-grade process unrelated to her breast cancer, recommendee foregoing surgical excision at this time and monitoring this area on future scans.  The patient agreed.    · Following 6 cycles of Xeloda, CT 6/6/18  showed stable findings, no evidence of progressive disease.  There was a comment regarding subcutaneous abnormality in the anterior abdominal wall and this was related to Lovenox injection sites.  Bone scan 6/6/18 showed no interval change.   · 7/11/2018, is now one week into her eighth cycle of Xeloda, continuing with 2000 mg in the morning, 1500 mg in the evening  for 14/21 days.  She had to delay her appointment from last week due to scheduling conflicts.  Overall, she continues to tolerate treatment extremely well, has some minor nausea and occasional loose stool.  She has some minimal changes in her hands with darkened pigmentation however no skin peeling is noted.  She has similar changes reported in her feet.  We will continue on with this cycle.  She will receive monthly Xgeva.  · As of 7/30/2018 patient continues to tolerate Xeloda quite well with very minimal side effects.  She will continue with her current dose.  She will have CT scans in the next few weeks, and follow-up with Dr. Hancock in about 4 weeks.  3. Right hip/gluteal hematoma:  · The patient had developed considerable pain in the right hip and underwent MRI 3/22/18 visit showed a 5.7 x 4.3 x 11 cm hematoma in the right gluteal region.  · Lovenox was held 3/23 through 4/6/18.  Resolution of pain, Lovenox was resumed. Hematoma likely occurred due to minimal trauma from transfer out of her wheelchair.   4. Right pulmonary embolism:  · Diagnosed on CT angiogram 10/21/17 involving small right lower lobe pulmonary artery.  Lower extremity Dopplers negative.  · Bilateral lower extremity Dopplers negative again 12/5/17.  · Continuing on Lovenox 1 mg/kg (80 mg) subcutaneous injection every 12 hours.  Lovenox was held for 2 weeks due to right hip hematoma seen on MRI, treatment resumed on 4/6/18 with no further obvious bleeding issues.  5. Cancer related pain:  · Previously receiving Duragesic 50 µg patch every 72 hours along with Dilaudid 4 mg as needed for breakthrough pain  · The patient's pain improved over time and she was able to discontinue both Duragesic and Dilaudid in the interval.  · Patient does take occasional Flexeril at bedtime due to back spasm/pain when she has been more active.  · Right hip pain as noted above due to right gluteal hematoma, previously prescribed Dilaudid 4 mg every 4 hours as needed  #60 with no refill.  Pain from hematoma resolved.  · The patient does have some occasional aggravation of her chronic back pain with more significant rehabilitation activity and requires an occasional dose of Dilaudid.  6. Chemotherapy-induced diarrhea:  · The patient reports some loose stools that occur during her week off of Xeloda for which she takes an occasional Imodium with excellent control  7. Traumatic left tibia/fibular fracture:  · Status post ORIF 12/6/17  · Specimen was sent for pathologic review, negative for evidence of malignancy  8. Hypercalcemia:  · Suspect hypercalcemia of malignancy, calcium in  10-11 range previously.  · Calcium normalized following initiation of monthly Xgeva on 1/23/18.  9. Chemotherapy-induced mucositis:  · Patient had a minimal degree of mucositis with cycle 2.  The patient has magic mouthwash to use as needed.  No subsequent mucositis.  10. Recurrent UTI, bladder wall thickening on CT:  · Patient had an enterococcal UTI on 3/2/18 sensitive to nitrofurantoin and received treatment, unclear how long.  · Recurrent UTI 3/20/18 with urine culture growing Klebsiella, initially treated with nitrofurantoin, transitioned to Levaquin.  · CT 4/4/18 with diffuse bladder wall thickening with increased nodular thickening at the left base.  Referral to urogynecology Dr. May Johnson.  She was placed on a prophylactic dose of trimethoprim 100 mg daily, bladder wall thickening felt to be related to recent recurrent urinary tract infections.    · Plans for cystoscopy 8/22/2018.     11. Mobility:  · The patient continues with aggressive outpatient physical therapy at [facility]. She continues to improve considerably and is now able to walk with assistance.  12.  Depression:  · The patient weaned herself off of Cymbalta recently and reports that her symptoms are stable.  13. Conjunctivitis:  · Her  had bacterial conjunctivites about a month ago and she is now started with redness and  swelling and discharge in her left eye.  She has been using his eyedrops.  I advised her to discontinue his eyedrops and will start her on Polytrim 1 drop every 4 hours for 5 days.    Plan:  1. Continue cycle 9 Xeloda (2000 mg a.m., 1500 mg p.m. for 14/21 days), currently 6 days into cycle.  2. Polytrim eye drops  3. Continue Lovenox 80 mg every 12 hours.  4. Continue outpatient physical therapy at [facility]  5. Xgeva due again 8/13/2018 with CBC, CMP, magnesium, phosphorus.  6. Patient will have CT scan of the chest, abdomen, pelvis, and bone scan on 8/13/2018.  7. Return for follow-up visit with Dr. Hancock 8/20/2018 to review her scan results with a CBC, CMP, and will be due to begin cycle 10 Xeloda.

## 2018-07-31 ENCOUNTER — OFFICE VISIT (OUTPATIENT)
Dept: INTERNAL MEDICINE | Facility: CLINIC | Age: 58
End: 2018-07-31

## 2018-07-31 VITALS
OXYGEN SATURATION: 99 % | SYSTOLIC BLOOD PRESSURE: 136 MMHG | HEART RATE: 107 BPM | DIASTOLIC BLOOD PRESSURE: 70 MMHG | BODY MASS INDEX: 37.81 KG/M2 | WEIGHT: 200 LBS

## 2018-07-31 DIAGNOSIS — C50.811 MALIGNANT NEOPLASM OF OVERLAPPING SITES OF RIGHT BREAST IN FEMALE, ESTROGEN RECEPTOR NEGATIVE (HCC): ICD-10-CM

## 2018-07-31 DIAGNOSIS — Z17.1 MALIGNANT NEOPLASM OF OVERLAPPING SITES OF RIGHT BREAST IN FEMALE, ESTROGEN RECEPTOR NEGATIVE (HCC): ICD-10-CM

## 2018-07-31 DIAGNOSIS — R74.8 ELEVATED LIVER ENZYMES: ICD-10-CM

## 2018-07-31 DIAGNOSIS — C79.51 BONE METASTASES: Primary | ICD-10-CM

## 2018-07-31 PROCEDURE — 99214 OFFICE O/P EST MOD 30 MIN: CPT | Performed by: INTERNAL MEDICINE

## 2018-07-31 RX ORDER — TRAMADOL HYDROCHLORIDE 50 MG/1
50 TABLET ORAL EVERY 8 HOURS PRN
Qty: 90 TABLET | Refills: 2 | Status: SHIPPED | OUTPATIENT
Start: 2018-07-31 | End: 2018-10-30 | Stop reason: SDUPTHER

## 2018-07-31 RX ORDER — GABAPENTIN 300 MG/1
300 CAPSULE ORAL 3 TIMES DAILY
Qty: 90 CAPSULE | Refills: 2 | Status: SHIPPED | OUTPATIENT
Start: 2018-07-31 | End: 2019-02-13 | Stop reason: SDUPTHER

## 2018-07-31 RX ORDER — TRAMADOL HYDROCHLORIDE 50 MG/1
50 TABLET ORAL EVERY 8 HOURS PRN
Qty: 90 TABLET | Refills: 2 | Status: SHIPPED | OUTPATIENT
Start: 2018-07-31 | End: 2018-07-31 | Stop reason: SDUPTHER

## 2018-07-31 NOTE — PROGRESS NOTES
Chief Complaint   Patient presents with   • Hypertension   breast ca w/ bone mets and pain  Abnormal lft    History of Present Illness   Martha Davey is a 57 y.o. female presents for follow up evaluation. She is doing well today with many areas of improvement. She is regaining strength through [facility] physical therapy twice weekly. She also is engaging in home exercises. She can now walk with the assistance of a walker. She had labs drawn today. It does reveal elevated lft. She is on xeloda and lialda. She has not been to her gastroenterologist recently. Some loose stools that are managed w/ prn immodium.   She has chronic pain from bony mets. She is taking tramadol prn for this. She actually prefers tramadol over dilaudid. She gets good benefit with this. Currently taking tramadol predominantly in the evening w/ prn tylenol in the day. Taking gabapentin bid.   H/o hypertension. bp has been normotensive off of meds. She reports that it is running 130s/80s at [facility].       The following portions of the patient's history were reviewed and updated as appropriate: allergies, current medications, past family history, past medical history, past social history, past surgical history and problem list.  Current Outpatient Prescriptions on File Prior to Visit   Medication Sig Dispense Refill   • acetaminophen (TYLENOL) 500 MG tablet Take 500 mg by mouth Every 6 (Six) Hours As Needed for Mild Pain .     • calcium carbonate (OS-CARY) 600 MG tablet Take 600 mg by mouth 2 (Two) Times a Day With Meals.     • capecitabine (XELODA) 500 MG chemo tablet Take 4 tabs (2000 mg) in the AM then take 3 tabs (1500 mg) in the PM for 14 days on then 7 days off. 98 tablet 3   • cyclobenzaprine (FLEXERIL) 10 MG tablet Take 1 tablet by mouth 3 (Three) Times a Day As Needed for Muscle Spasms. 30 tablet 3   • enoxaparin (LOVENOX) 80 MG/0.8ML solution syringe INJECT 0.8 ML UNDER THE SKIN Q 12 H  3   • FLONASE ALLERGY RELIEF 50 MCG/ACT  nasal spray 2 sprays into each nostril daily.  11   • HYDROmorphone (DILAUDID) 4 MG tablet Take 1 tablet by mouth Every 4 (Four) Hours As Needed for Moderate Pain . 60 tablet 0   • mesalamine (LIALDA) 1.2 g EC tablet Take 1 tablet by mouth 2 (Two) Times a Day. 180 tablet 1   • omeprazole (PriLOSEC) 40 MG capsule TAKE 1 CAPSULE DAILY 90 capsule 2   • ondansetron ODT (ZOFRAN-ODT) 8 MG disintegrating tablet Take 1 tablet by mouth Every 8 (Eight) Hours As Needed for Nausea or Vomiting. 30 tablet 1   • trimethoprim (TRIMPEX) 100 MG tablet Take 100 mg by mouth Daily.  2   • trimethoprim-polymyxin b (POLYTRIM) 03043-8.1 UNIT/ML-% ophthalmic solution Administer 1 drop into the left eye Every 4 (Four) Hours for 5 days. 10 mL 0   • [DISCONTINUED] gabapentin (NEURONTIN) 300 MG capsule Take 1 capsule by mouth 3 (Three) Times a Day. (Patient taking differently: Take 300 mg by mouth 2 (Two) Times a Day.) 90 capsule 2   • [DISCONTINUED] traMADol (ULTRAM) 50 MG tablet Take 1 tablet by mouth Every 8 (Eight) Hours As Needed for Moderate Pain . 30 tablet 0   • sennosides-docusate sodium (SENOKOT-S) 8.6-50 MG tablet Take 2 tablets by mouth 2 (Two) Times a Day. 90 tablet 2   • Tuberculin PPD (APLISOL ID) Inject  into the skin.     • Unable to find SWISH AND SPIT 5 ML PO Q 2 H PRN  0     No current facility-administered medications on file prior to visit.      Review of Systems   Constitutional: Negative.    HENT: Negative.    Eyes: Negative.    Respiratory: Negative.    Cardiovascular: Negative.    Gastrointestinal: Positive for diarrhea.   Endocrine: Negative.    Genitourinary: Negative.    Musculoskeletal: Positive for arthralgias, back pain and gait problem.   Skin: Negative.    Allergic/Immunologic: Negative.    Hematological: Negative.    Psychiatric/Behavioral: Negative.        Objective   Physical Exam   Constitutional: She is oriented to person, place, and time. She appears well-developed and well-nourished.   HENT:   Head:  Normocephalic and atraumatic.   Right Ear: Hearing, tympanic membrane and external ear normal.   Left Ear: Hearing, tympanic membrane and external ear normal.   Nose: Nose normal.   Mouth/Throat: Oropharynx is clear and moist.   Eyes: Pupils are equal, round, and reactive to light. Conjunctivae and EOM are normal.   Neck: Normal range of motion. Neck supple. No thyromegaly present.   Cardiovascular: Normal rate, regular rhythm and normal heart sounds.    No murmur heard.  Pulmonary/Chest: Effort normal and breath sounds normal. Right breast exhibits no mass. Left breast exhibits no mass.   Abdominal: Soft. Bowel sounds are normal. She exhibits no distension. There is no hepatosplenomegaly. There is no tenderness.   Genitourinary: No breast tenderness.   Musculoskeletal:   Chronic weakness. Improving.    Lymphadenopathy:     She has no cervical adenopathy.   Neurological: She is alert and oriented to person, place, and time.   Skin: Skin is warm and dry.   Psychiatric: She has a normal mood and affect. Her speech is normal and behavior is normal. Judgment and thought content normal. Cognition and memory are normal.        /70   Pulse 107   Wt 90.7 kg (200 lb)   LMP  (LMP Unknown)   SpO2 99%   BMI 37.81 kg/m²     Assessment/Plan   Diagnoses and all orders for this visit:    Bone metastases (CMS/HCC)    Malignant neoplasm of overlapping sites of right breast in female, estrogen receptor negative (CMS/HCC)    Elevated liver enzymes    Other orders  -     Discontinue: traMADol (ULTRAM) 50 MG tablet; Take 1 tablet by mouth Every 8 (Eight) Hours As Needed for Moderate Pain .  -     traMADol (ULTRAM) 50 MG tablet; Take 1 tablet by mouth Every 8 (Eight) Hours As Needed for Moderate Pain .  -     gabapentin (NEURONTIN) 300 MG capsule; Take 1 capsule by mouth 3 (Three) Times a Day.        Patient with breast cancer w/ laz mets. She will continue tramadol and gabapentin for pain. She signed a narcotic agreement  form for this. She is aware of the risks and benefits of the medications.   She does have abnormal lft and will f/u w/ gi as she has chronic colitis on lialda. She is also taking xeloda and either could effect lft. Will d/c tylenol for now w/ more tramadol for pain relief. She will continue to follow w/ all specialists. She is current on vaccinations and will get a flu vac once available. Will monitor bp at rehab. Off all bp meds at this time and will monitor this.  To repeat cmp in 1 months. Td vac today.   F/u 3-6 mo or prn.

## 2018-08-10 DIAGNOSIS — Z17.1 MALIGNANT NEOPLASM OF OVERLAPPING SITES OF RIGHT BREAST IN FEMALE, ESTROGEN RECEPTOR NEGATIVE (HCC): ICD-10-CM

## 2018-08-10 DIAGNOSIS — C50.811 MALIGNANT NEOPLASM OF OVERLAPPING SITES OF RIGHT BREAST IN FEMALE, ESTROGEN RECEPTOR NEGATIVE (HCC): ICD-10-CM

## 2018-08-13 ENCOUNTER — INFUSION (OUTPATIENT)
Dept: ONCOLOGY | Facility: HOSPITAL | Age: 58
End: 2018-08-13

## 2018-08-13 ENCOUNTER — HOSPITAL ENCOUNTER (OUTPATIENT)
Dept: NUCLEAR MEDICINE | Facility: HOSPITAL | Age: 58
Discharge: HOME | End: 2018-08-13
Attending: INTERNAL MEDICINE

## 2018-08-13 ENCOUNTER — LAB (OUTPATIENT)
Dept: LAB | Facility: HOSPITAL | Age: 58
End: 2018-08-13

## 2018-08-13 ENCOUNTER — HOSPITAL ENCOUNTER (OUTPATIENT)
Dept: CT IMAGING | Facility: HOSPITAL | Age: 58
Discharge: HOME | End: 2018-08-13
Attending: INTERNAL MEDICINE | Admitting: INTERNAL MEDICINE

## 2018-08-13 DIAGNOSIS — G89.3 CANCER ASSOCIATED PAIN: ICD-10-CM

## 2018-08-13 DIAGNOSIS — C79.51 BONE METASTASES: ICD-10-CM

## 2018-08-13 DIAGNOSIS — C50.811 MALIGNANT NEOPLASM OF OVERLAPPING SITES OF RIGHT BREAST IN FEMALE, ESTROGEN RECEPTOR NEGATIVE (HCC): ICD-10-CM

## 2018-08-13 DIAGNOSIS — C50.811 MALIGNANT NEOPLASM OF OVERLAPPING SITES OF RIGHT BREAST IN FEMALE, ESTROGEN RECEPTOR NEGATIVE (HCC): Primary | ICD-10-CM

## 2018-08-13 DIAGNOSIS — Z17.1 MALIGNANT NEOPLASM OF OVERLAPPING SITES OF RIGHT BREAST IN FEMALE, ESTROGEN RECEPTOR NEGATIVE (HCC): ICD-10-CM

## 2018-08-13 DIAGNOSIS — Z17.1 MALIGNANT NEOPLASM OF OVERLAPPING SITES OF RIGHT BREAST IN FEMALE, ESTROGEN RECEPTOR NEGATIVE (HCC): Primary | ICD-10-CM

## 2018-08-13 LAB
ALBUMIN SERPL-MCNC: 3.8 G/DL (ref 3.5–5.2)
ALBUMIN/GLOB SERPL: 1.6 G/DL (ref 1.1–2.4)
ALP SERPL-CCNC: 65 U/L (ref 38–116)
ALT SERPL W P-5'-P-CCNC: 26 U/L (ref 0–33)
ANION GAP SERPL CALCULATED.3IONS-SCNC: 9.8 MMOL/L
AST SERPL-CCNC: 22 U/L (ref 0–32)
BASOPHILS # BLD AUTO: 0.03 10*3/MM3 (ref 0–0.1)
BASOPHILS NFR BLD AUTO: 0.7 % (ref 0–1.1)
BILIRUB SERPL-MCNC: 0.3 MG/DL (ref 0.1–1.2)
BUN BLD-MCNC: 11 MG/DL (ref 6–20)
BUN/CREAT SERPL: 12.9 (ref 7.3–30)
CALCIUM SPEC-SCNC: 7.8 MG/DL (ref 8.5–10.2)
CHLORIDE SERPL-SCNC: 103 MMOL/L (ref 98–107)
CO2 SERPL-SCNC: 27.2 MMOL/L (ref 22–29)
CREAT BLD-MCNC: 0.85 MG/DL (ref 0.6–1.1)
CREAT BLDA-MCNC: 0.8 MG/DL (ref 0.6–1.3)
DEPRECATED RDW RBC AUTO: 71.9 FL (ref 37–49)
EOSINOPHIL # BLD AUTO: 0.15 10*3/MM3 (ref 0–0.36)
EOSINOPHIL NFR BLD AUTO: 3.3 % (ref 1–5)
ERYTHROCYTE [DISTWIDTH] IN BLOOD BY AUTOMATED COUNT: 19.1 % (ref 11.7–14.5)
GFR SERPL CREATININE-BSD FRML MDRD: 69 ML/MIN/1.73
GLOBULIN UR ELPH-MCNC: 2.4 GM/DL (ref 1.8–3.5)
GLUCOSE BLD-MCNC: 124 MG/DL (ref 74–124)
HCT VFR BLD AUTO: 35.6 % (ref 34–45)
HGB BLD-MCNC: 11.6 G/DL (ref 11.5–14.9)
IMM GRANULOCYTES # BLD: 0.03 10*3/MM3 (ref 0–0.03)
IMM GRANULOCYTES NFR BLD: 0.7 % (ref 0–0.5)
LYMPHOCYTES # BLD AUTO: 0.74 10*3/MM3 (ref 1–3.5)
LYMPHOCYTES NFR BLD AUTO: 16.2 % (ref 20–49)
MAGNESIUM SERPL-MCNC: 2 MG/DL (ref 1.8–2.5)
MCH RBC QN AUTO: 33.4 PG (ref 27–33)
MCHC RBC AUTO-ENTMCNC: 32.6 G/DL (ref 32–35)
MCV RBC AUTO: 102.6 FL (ref 83–97)
MONOCYTES # BLD AUTO: 0.48 10*3/MM3 (ref 0.25–0.8)
MONOCYTES NFR BLD AUTO: 10.5 % (ref 4–12)
NEUTROPHILS # BLD AUTO: 3.14 10*3/MM3 (ref 1.5–7)
NEUTROPHILS NFR BLD AUTO: 68.6 % (ref 39–75)
NRBC BLD MANUAL-RTO: 0 /100 WBC (ref 0–0)
PHOSPHATE SERPL-MCNC: 2.2 MG/DL (ref 2.5–4.5)
PLATELET # BLD AUTO: 309 10*3/MM3 (ref 150–375)
PMV BLD AUTO: 9.5 FL (ref 8.9–12.1)
POTASSIUM BLD-SCNC: 4.5 MMOL/L (ref 3.5–4.7)
PROT SERPL-MCNC: 6.2 G/DL (ref 6.3–8)
RBC # BLD AUTO: 3.47 10*6/MM3 (ref 3.9–5)
SODIUM BLD-SCNC: 140 MMOL/L (ref 134–145)
WBC NRBC COR # BLD: 4.57 10*3/MM3 (ref 4–10)

## 2018-08-13 PROCEDURE — 84100 ASSAY OF PHOSPHORUS: CPT | Performed by: INTERNAL MEDICINE

## 2018-08-13 PROCEDURE — 71260 CT THORAX DX C+: CPT

## 2018-08-13 PROCEDURE — 74177 CT ABD & PELVIS W/CONTRAST: CPT

## 2018-08-13 PROCEDURE — 83735 ASSAY OF MAGNESIUM: CPT | Performed by: INTERNAL MEDICINE

## 2018-08-13 PROCEDURE — 80053 COMPREHEN METABOLIC PANEL: CPT | Performed by: INTERNAL MEDICINE

## 2018-08-13 PROCEDURE — 0 TECHNETIUM MEDRONATE KIT: Performed by: INTERNAL MEDICINE

## 2018-08-13 PROCEDURE — A9503 TC99M MEDRONATE: HCPCS | Performed by: INTERNAL MEDICINE

## 2018-08-13 PROCEDURE — 96372 THER/PROPH/DIAG INJ SC/IM: CPT | Performed by: INTERNAL MEDICINE

## 2018-08-13 PROCEDURE — 82565 ASSAY OF CREATININE: CPT

## 2018-08-13 PROCEDURE — 36415 COLL VENOUS BLD VENIPUNCTURE: CPT | Performed by: INTERNAL MEDICINE

## 2018-08-13 PROCEDURE — 85025 COMPLETE CBC W/AUTO DIFF WBC: CPT | Performed by: INTERNAL MEDICINE

## 2018-08-13 PROCEDURE — 25010000002 IOPAMIDOL 61 % SOLUTION: Performed by: INTERNAL MEDICINE

## 2018-08-13 PROCEDURE — 25010000002 DENOSUMAB 120 MG/1.7ML SOLUTION: Performed by: INTERNAL MEDICINE

## 2018-08-13 PROCEDURE — 78306 BONE IMAGING WHOLE BODY: CPT

## 2018-08-13 RX ORDER — MELATONIN
1000 DAILY
COMMUNITY

## 2018-08-13 RX ORDER — TC 99M MEDRONATE 20 MG/10ML
22.6 INJECTION, POWDER, LYOPHILIZED, FOR SOLUTION INTRAVENOUS
Status: COMPLETED | OUTPATIENT
Start: 2018-08-13 | End: 2018-08-13

## 2018-08-13 RX ADMIN — DENOSUMAB 120 MG: 120 INJECTION SUBCUTANEOUS at 14:29

## 2018-08-13 RX ADMIN — IOPAMIDOL 85 ML: 612 INJECTION, SOLUTION INTRAVENOUS at 11:02

## 2018-08-13 RX ADMIN — Medication 22.6 MILLICURIE: at 09:36

## 2018-08-14 ENCOUNTER — TELEPHONE (OUTPATIENT)
Dept: ONCOLOGY | Facility: HOSPITAL | Age: 58
End: 2018-08-14

## 2018-08-14 ENCOUNTER — TELEPHONE (OUTPATIENT)
Dept: ONCOLOGY | Facility: CLINIC | Age: 58
End: 2018-08-14

## 2018-08-14 DIAGNOSIS — R19.7 NAUSEA VOMITING AND DIARRHEA: Primary | ICD-10-CM

## 2018-08-14 DIAGNOSIS — R11.2 NAUSEA VOMITING AND DIARRHEA: Primary | ICD-10-CM

## 2018-08-14 RX ORDER — PROCHLORPERAZINE MALEATE 10 MG
10 TABLET ORAL EVERY 6 HOURS PRN
Qty: 30 TABLET | Refills: 0 | Status: SHIPPED | OUTPATIENT
Start: 2018-08-14

## 2018-08-14 NOTE — TELEPHONE ENCOUNTER
----- Message from Ivana Lama RN sent at 8/14/2018  1:06 PM EDT -----  Regarding: needs 2 unit apt at [place] tomorrow with NP  Patient needs stat labs and a 2 unit apt with NP tomorrow at .  Thanks

## 2018-08-14 NOTE — TELEPHONE ENCOUNTER
----- Message from Virginia Mayberry sent at 8/14/2018 10:44 AM EDT -----  Contact: [phone]  Pt calling having diarrhea and loss of appetite.

## 2018-08-14 NOTE — TELEPHONE ENCOUNTER
Patient called with complaints of sinus congestion, headache, diarrhea, nausea and loss of appetite over past few days.  Currently off Xeloda.  Due to start tomorrow.  Discussed with Glory GONZALEZ.  Orders received to schedule labs and NP apt for tomorrow at [facility].  Message sent to schedule desk.  Taking imodium at home for diarrhea.  Still with nausea in spite of taking Zofran.  Compazine 10 mg po q 6 hours prn sent to patient's pharmacy.  Instructed patient to start OTC antihistamine with decongestant such as Claritin D.  Pt v/u.  Informed patient that scheduling would call her with apt time.  Pt v/u.

## 2018-08-15 ENCOUNTER — LAB (OUTPATIENT)
Dept: OTHER | Facility: HOSPITAL | Age: 58
End: 2018-08-15

## 2018-08-15 ENCOUNTER — OFFICE VISIT (OUTPATIENT)
Dept: ONCOLOGY | Facility: CLINIC | Age: 58
End: 2018-08-15

## 2018-08-15 VITALS
HEIGHT: 61 IN | RESPIRATION RATE: 18 BRPM | SYSTOLIC BLOOD PRESSURE: 115 MMHG | OXYGEN SATURATION: 96 % | TEMPERATURE: 98 F | HEART RATE: 113 BPM | DIASTOLIC BLOOD PRESSURE: 75 MMHG

## 2018-08-15 DIAGNOSIS — E83.52 HYPERCALCEMIA OF MALIGNANCY: ICD-10-CM

## 2018-08-15 DIAGNOSIS — R19.7 NAUSEA VOMITING AND DIARRHEA: ICD-10-CM

## 2018-08-15 DIAGNOSIS — Z86.711 HISTORY OF PULMONARY EMBOLISM: ICD-10-CM

## 2018-08-15 DIAGNOSIS — C79.51 BONE METASTASES: Primary | ICD-10-CM

## 2018-08-15 DIAGNOSIS — R11.2 NAUSEA VOMITING AND DIARRHEA: ICD-10-CM

## 2018-08-15 DIAGNOSIS — C50.811 MALIGNANT NEOPLASM OF OVERLAPPING SITES OF RIGHT BREAST IN FEMALE, ESTROGEN RECEPTOR NEGATIVE (HCC): ICD-10-CM

## 2018-08-15 DIAGNOSIS — Z17.1 MALIGNANT NEOPLASM OF OVERLAPPING SITES OF RIGHT BREAST IN FEMALE, ESTROGEN RECEPTOR NEGATIVE (HCC): ICD-10-CM

## 2018-08-15 LAB
ALBUMIN SERPL-MCNC: 4 G/DL (ref 3.5–5.2)
ALBUMIN/GLOB SERPL: 1.7 G/DL
ALP SERPL-CCNC: 66 U/L (ref 39–117)
ALT SERPL W P-5'-P-CCNC: 31 U/L (ref 1–33)
ANION GAP SERPL CALCULATED.3IONS-SCNC: 12.5 MMOL/L
AST SERPL-CCNC: 30 U/L (ref 1–32)
BASOPHILS # BLD AUTO: 0.04 10*3/MM3 (ref 0–0.2)
BASOPHILS NFR BLD AUTO: 1 % (ref 0–1.5)
BILIRUB SERPL-MCNC: 0.4 MG/DL (ref 0.1–1.2)
BUN BLD-MCNC: 9 MG/DL (ref 6–20)
BUN/CREAT SERPL: 10.6 (ref 7–25)
CALCIUM SPEC-SCNC: 8 MG/DL (ref 8.6–10.5)
CHLORIDE SERPL-SCNC: 106 MMOL/L (ref 98–107)
CO2 SERPL-SCNC: 25.5 MMOL/L (ref 22–29)
CREAT BLD-MCNC: 0.85 MG/DL (ref 0.57–1)
DEPRECATED RDW RBC AUTO: 74.3 FL (ref 37–54)
EOSINOPHIL # BLD AUTO: 0.3 10*3/MM3 (ref 0–0.7)
EOSINOPHIL NFR BLD AUTO: 7.6 % (ref 0.3–6.2)
ERYTHROCYTE [DISTWIDTH] IN BLOOD BY AUTOMATED COUNT: 19.8 % (ref 11.7–13)
GFR SERPL CREATININE-BSD FRML MDRD: 69 ML/MIN/1.73
GLOBULIN UR ELPH-MCNC: 2.3 GM/DL
GLUCOSE BLD-MCNC: 154 MG/DL (ref 65–99)
HCT VFR BLD AUTO: 35.3 % (ref 35.6–45.5)
HGB BLD-MCNC: 11.9 G/DL (ref 11.9–15.5)
IMM GRANULOCYTES # BLD: 0.03 10*3/MM3 (ref 0–0.03)
IMM GRANULOCYTES NFR BLD: 0.8 % (ref 0–0.5)
LYMPHOCYTES # BLD AUTO: 0.52 10*3/MM3 (ref 0.9–4.8)
LYMPHOCYTES NFR BLD AUTO: 13.1 % (ref 19.6–45.3)
MCH RBC QN AUTO: 34.4 PG (ref 26.9–32)
MCHC RBC AUTO-ENTMCNC: 33.7 G/DL (ref 32.4–36.3)
MCV RBC AUTO: 102 FL (ref 80.5–98.2)
MONOCYTES # BLD AUTO: 0.37 10*3/MM3 (ref 0.2–1.2)
MONOCYTES NFR BLD AUTO: 9.3 % (ref 5–12)
NEUTROPHILS # BLD AUTO: 2.7 10*3/MM3 (ref 1.9–8.1)
NEUTROPHILS NFR BLD AUTO: 68.2 % (ref 42.7–76)
NRBC BLD MANUAL-RTO: 0 /100 WBC (ref 0–0)
PLATELET # BLD AUTO: 274 10*3/MM3 (ref 140–500)
PMV BLD AUTO: 9.9 FL (ref 6–12)
POTASSIUM BLD-SCNC: 4 MMOL/L (ref 3.5–5.2)
PROT SERPL-MCNC: 6.3 G/DL (ref 6–8.5)
RBC # BLD AUTO: 3.46 10*6/MM3 (ref 3.9–5.2)
SODIUM BLD-SCNC: 144 MMOL/L (ref 136–145)
WBC NRBC COR # BLD: 3.96 10*3/MM3 (ref 4.5–10.7)

## 2018-08-15 PROCEDURE — 99214 OFFICE O/P EST MOD 30 MIN: CPT | Performed by: NURSE PRACTITIONER

## 2018-08-15 PROCEDURE — 85025 COMPLETE CBC W/AUTO DIFF WBC: CPT | Performed by: NURSE PRACTITIONER

## 2018-08-15 PROCEDURE — 80053 COMPREHEN METABOLIC PANEL: CPT | Performed by: NURSE PRACTITIONER

## 2018-08-15 PROCEDURE — 36415 COLL VENOUS BLD VENIPUNCTURE: CPT

## 2018-08-15 RX ORDER — PHENAZOPYRIDINE HYDROCHLORIDE 200 MG/1
200 TABLET, FILM COATED ORAL 3 TIMES DAILY PRN
COMMUNITY
End: 2021-05-14 | Stop reason: SDUPTHER

## 2018-08-15 NOTE — PROGRESS NOTES
Subjective .     REASONS FOR FOLLOWUP:    1. Stage Ib ([id number]) right breast cancer: Diagnosed May 2010 with excisional biopsy for invasive ductal carcinoma, 1.3 cm, grade 2, ER 90%, AZ 80%, HER-2/peter negative (1+ IHC).  Subsequent right mastectomy in July 2010 with no residual breast malignancy, 1/5 sentinel lymph node with micrometastasis (0.25 mm).  Treated in the [facility] system with adjuvant AC ×4 cycles in 2010 (no taxanes administered due to underlying Charcot-Saloni-Tooth with peripheral neuropathy).  Adjuvant Femara (postmenopausal) initiated October 2010 with plan to treat ×10 years.  Genetic testing reportedly negative.  Developed osteopenia treated with Prolia beginning 2/27/13.  2. Recurrent/metastatic disease identified 10/8/17 involving thoracic spine with cord compression at T6, lumbosacral involvement, sternal and right sternoclavicular involvement.  Femara discontinued.  Radiation administered (in the [facility] system) to the thoracic spine beginning 10/19/17 treating T3-T9 to a dose of 24 gray in 6 fractions.  3. Evaluation with MRI 12/8/17 showing persistent T6 cord compression with persistent neurologic compromise requiring surgical treatment 12/11/17 with T6 laminectomy/corpectomy and T3-T9 fusion.  Pathology with metastatic carcinoma of breast origin, ER negative, AZ negative, HER-2/peter negative (1+ IHC).  4. Right pulmonary embolism 10/21/17 continuing on Lovenox 1 mg/kg (100 mg) every 12 hours.  No evidence of DVT.  Lovenox held due to right gluteus hematoma 3/23/18 through 4/6/18.  5. Cancer related pain previously on Duragesic 50 µg patch every 72 hours and Dilaudid 4 mg as needed for breakthrough pain.  Narcotics subsequently discontinued with improvement in pain in January 2018.  6. Radiation therapy to L3 dural metastasis and left humerus initiated 1/15/18 (10 fractions), completed 1/26/18  7. Monthly Xgeva initiated 1/23/18  8. Mild hypercalcemia of malignancy  9. Initiation of  palliative oral single agent Xeloda 2/7/18 (2000 mg a.m., 1500 mg p.m. for 14/21 days).  10. 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  In reviewing prior CT scans, has been present for some time.  We will monitor for now, if it enlarges consider surgical excision.    HISTORY OF PRESENT ILLNESS:  The patient is a 57 y.o. year old female who is here for follow-up with the above-mentioned history.    Nausea   Associated symptoms include coughing, fatigue, nausea and weakness. Pertinent negatives include no abdominal pain, arthralgias, chest pain, congestion, fever, headaches, joint swelling, myalgias, numbness, rash, sore throat or vomiting.   Diarrhea    Associated symptoms include coughing. Pertinent negatives include no abdominal pain, arthralgias, fever, headaches, myalgias or vomiting.   Fatigue   Associated symptoms include coughing, fatigue, nausea and weakness. Pertinent negatives include no abdominal pain, arthralgias, chest pain, congestion, fever, headaches, joint swelling, myalgias, numbness, rash, sore throat or vomiting.     The patient is a 57 year old female with the above mentioned history, here today through triage with reports of congestion and cough related to postnasal drip.  She did call the office yesterday with reports of these symptoms and was instructed to come in today for lab work and evaluation.  She denies fever, chills, shortness of breath, wheezing or chest pain.  Neutropenic.  She did start her first day of her Xeloda cycle today.  She does report increased diarrhea.  This has an ongoing for approximately 1 week.  She did take Imodium yesterday which relieved her diarrhea and she did take Imodium this morning as well.    She denies bleeding, bruising, skin changes, swelling.  Continues to exercise with physical  therapy twice weekly.  She states that she has become more fatigued throughout these exercises over the past couple of weeks.  She is due for physical therapy again tomorrow.  Her magnesium on the , just 2 days ago, was 2.  Her CMP today is within normal limits.  Her vital signs are stable.    She was instructed to take Claritin-D when she called triage and she states that this has in fact improved her symptoms.  A scheduled CAT scan performed on 2018 feels no indication of pneumonia or acute process.    Past Medical History:   Diagnosis Date   • Acute pulmonary embolism, cancer-related 10/2017   • Allergic rhinitis    • Arthritis     Right knee; left shoulder   • Cancer (CMS/HCC) 2010    Right breast   • Cancer (CMS/HCC) 10/2017    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   • Charcot-Saloni-Tooth disease    • CPAP (continuous positive airway pressure) dependence    • GERD (gastroesophageal reflux disease)    • H/O Colon polyps    • H/O Uterine fibroid    • Heart murmur    • Hyperlipidemia    • Hypertension    • PONV (postoperative nausea and vomiting)      Past Surgical History:   Procedure Laterality Date   • BLADDER SURGERY      Bladder lift   • BREAST RECONSTRUCTION, BREAST TISSUE EXPANDER INSERTION Right    • BREAST SURGERY Right 2010    Mastectomy   •  SECTION  ,    • CHOLECYSTECTOMY     • COLONOSCOPY     • HERNIA REPAIR  2015    [facility]   • HYSTERECTOMY      Partial   • KNEE SURGERY Right    • LEG SURGERY Left     Broken   • MASTECTOMY Right    • NC TREAT TIBIAL SHAFT FX, INTRAMED IMPLANT Left 2017    Procedure: TIBIA INTRAMEDULLARY NAIL/DON INSERTION;  Surgeon: Romero Shanks MD;  Location: [facility];  Service: Orthopedics   • THORACIC DECOMPRESSION POSTERIOR FUSION WITH INSTRUMENTATION N/A 2017    Procedure: T6 costotransversectomy and decompression with T3 to  T9 posterior spinal fusion with instrumentation with Jennifer Gonzalez and Nael Wilkes;   Surgeon: Quan Nayak MD;  Location[facility] OR;  Service:        ONCOLOGIC HISTORY:  (History from previous dates can be found in the separate document.)    Current Outpatient Prescriptions on File Prior to Visit   Medication Sig Dispense Refill   • acetaminophen (TYLENOL) 500 MG tablet Take 500 mg by mouth Every 6 (Six) Hours As Needed for Mild Pain .     • calcium carbonate (OS-CARY) 600 MG tablet Take 600 mg by mouth 2 (Two) Times a Day With Meals.     • capecitabine (XELODA) 500 MG chemo tablet Take 4 tabs (2000 mg) in the AM then take 3 tabs (1500 mg) in the PM for 14 days on then 7 days off. 98 tablet 3   • cholecalciferol (VITAMIN D3) 1000 units tablet Take 1,000 Units by mouth Daily.     • cyclobenzaprine (FLEXERIL) 10 MG tablet Take 1 tablet by mouth 3 (Three) Times a Day As Needed for Muscle Spasms. 30 tablet 3   • enoxaparin (LOVENOX) 80 MG/0.8ML solution syringe INJECT 0.8 ML UNDER THE SKIN Q 12 H  3   • FLONASE ALLERGY RELIEF 50 MCG/ACT nasal spray 2 sprays into each nostril daily.  11   • gabapentin (NEURONTIN) 300 MG capsule Take 1 capsule by mouth 3 (Three) Times a Day. 90 capsule 2   • HYDROmorphone (DILAUDID) 4 MG tablet Take 1 tablet by mouth Every 4 (Four) Hours As Needed for Moderate Pain . 60 tablet 0   • mesalamine (LIALDA) 1.2 g EC tablet Take 1 tablet by mouth 2 (Two) Times a Day. 180 tablet 1   • omeprazole (PriLOSEC) 40 MG capsule TAKE 1 CAPSULE DAILY 90 capsule 2   • ondansetron ODT (ZOFRAN-ODT) 8 MG disintegrating tablet Take 1 tablet by mouth Every 8 (Eight) Hours As Needed for Nausea or Vomiting. 30 tablet 1   • prochlorperazine (COMPAZINE) 10 MG tablet Take 1 tablet by mouth Every 6 (Six) Hours As Needed for Nausea or Vomiting. 30 tablet 0   • sennosides-docusate sodium (SENOKOT-S) 8.6-50 MG tablet Take 2 tablets by mouth 2 (Two) Times a Day. 90 tablet 2   • traMADol (ULTRAM) 50 MG tablet Take 1 tablet by mouth Every 8 (Eight) Hours As Needed for Moderate Pain . 90 tablet 2    • trimethoprim (TRIMPEX) 100 MG tablet Take 100 mg by mouth Daily.  2   • Tuberculin PPD (APLISOL ID) Inject  into the skin.     • Unable to find SWISH AND SPIT 5 ML PO Q 2 H PRN  0     No current facility-administered medications on file prior to visit.        ALLERGIES:     Allergies   Allergen Reactions   • Hydrocodone Nausea Only   • Morphine And Related Nausea And Vomiting     Social History     Social History   • Marital status:      Spouse name: Murray   • Number of children: 3   • Years of education: College     Occupational History   •  [employer]   •  Retired     Social History Main Topics   • Smoking status: Never Smoker   • Smokeless tobacco: Never Used   • Alcohol use Yes      Comment: social   • Drug use: No   • Sexual activity: Defer     Other Topics Concern   • Not on file     Social History Narrative   • No narrative on file     Family History   Problem Relation Age of Onset   • Heart disease Mother    • Hyperlipidemia Mother    • Hypertension Mother    • Diabetes Father    • Charcot-Saloni-Tooth disease Father    • Heart disease Father    • Hypertension Father    • Heart failure Father    • Diabetes Sister    • Heart disease Sister    • Hypertension Sister    • Heart disease Maternal Aunt    • Scoliosis Maternal Aunt    • Heart disease Maternal Uncle    • Diabetes Maternal Grandmother    • Heart disease Maternal Grandmother    • Hypertension Maternal Grandmother    • Uterine cancer Maternal Grandmother    • Heart disease Maternal Grandfather      Review of Systems   Constitutional: Positive for fatigue. Negative for activity change, appetite change, fever and unexpected weight change.   HENT: Positive for postnasal drip, rhinorrhea and sneezing. Negative for congestion, mouth sores, nosebleeds, sore throat and voice change.    Eyes: Positive for discharge (clear drainage) and redness.   Respiratory: Positive for cough. Negative for chest tightness, shortness of breath and wheezing.     Cardiovascular: Negative for chest pain, palpitations and leg swelling.   Gastrointestinal: Positive for diarrhea (See HPI) and nausea. Negative for abdominal distention, abdominal pain, blood in stool, constipation and vomiting.   Endocrine: Negative for cold intolerance and heat intolerance.   Genitourinary: Negative for difficulty urinating, dysuria, frequency and hematuria.   Musculoskeletal: Positive for back pain. Negative for arthralgias, joint swelling and myalgias.   Skin: Negative for rash.   Neurological: Positive for weakness. Negative for dizziness, syncope, light-headedness, numbness and headaches.   Hematological: Negative for adenopathy. Does not bruise/bleed easily.   Psychiatric/Behavioral: Negative for confusion and sleep disturbance. The patient is not nervous/anxious.          Objective      Vitals:    08/15/18 1108   BP: 115/75   Pulse: 113   Resp: 18   Temp: 98 °F (36.7 °C)   SpO2: 96%      Current Status 8/15/2018   ECOG score 1   Pain 2/10    Physical Exam   Constitutional: She is oriented to person, place, and time. She appears well-developed and well-nourished.   Walking with a walker, accompanied by her son.   HENT:   Mouth/Throat: Oropharynx is clear and moist.   Eyes: Right eye exhibits no discharge and no exudate.   Mild periorbital edema with bilateral conjunctival and scleral injection   Neck: No thyromegaly present.   Cardiovascular: Normal rate, regular rhythm and normal heart sounds.    No murmur heard.  Pulmonary/Chest: Breath sounds normal. No respiratory distress.   Abdominal: Soft. Bowel sounds are normal. She exhibits no distension. There is no tenderness.   Musculoskeletal: She exhibits edema (trace bilateral).   Lower extremity braces in place.    Neurological: She is alert and oriented to person, place, and time. No cranial nerve deficit.   Skin: Skin is warm and dry. No rash noted.   Slight brownish discoloration the palms of hands, no erythema nor skin peeling.    Psychiatric: She has a normal mood and affect. Her behavior is normal.       RECENT LABS:  Hematology WBC   Date Value Ref Range Status   08/15/2018 3.96 (L) 4.50 - 10.70 10*3/mm3 Final     RBC   Date Value Ref Range Status   08/15/2018 3.46 (L) 3.90 - 5.20 10*6/mm3 Final     Hemoglobin   Date Value Ref Range Status   08/15/2018 11.9 11.9 - 15.5 g/dL Final     Hematocrit   Date Value Ref Range Status   08/15/2018 35.3 (L) 35.6 - 45.5 % Final     Platelets   Date Value Ref Range Status   08/15/2018 274 140 - 500 10*3/mm3 Final        Lab Results   Component Value Date    GLUCOSE 154 (H) 08/15/2018    BUN 9 08/15/2018    CREATININE 0.85 08/15/2018    EGFRIFNONA 69 08/15/2018    EGFRIFAFRI 80 09/25/2017    BCR 10.6 08/15/2018    K 4.0 08/15/2018    CO2 25.5 08/15/2018    CALCIUM 8.0 (L) 08/15/2018    PROTENTOTREF 6.4 09/25/2017    ALBUMIN 4.00 08/15/2018    LABIL2 2.2 09/25/2017    AST 30 08/15/2018    ALT 31 08/15/2018          Assessment/Plan      1. Previous Stage Ib ([id number]) right breast cancer:  · Diagnosed May 2010 with excisional biopsy for invasive ductal carcinoma, 1.3 cm, grade 2, ER 90%, MT 80%, HER-2/peter negative (1+ IHC).    · Subsequent right mastectomy in July 2010 with no residual breast malignancy, 1/5 sentinel lymph node with micrometastasis (0.25 mm).    · Treated in the [place] [facility] system with adjuvant AC ×4 cycles in 2010 (no taxanes administered due to underlying Charcot-Saloni-Tooth with peripheral neuropathy).    · Adjuvant Femara (postmenopausal) initiated October 2010 with plan to treat ×10 years.    · Genetic testing reportedly negative.    · Developed osteopenia treated with Prolia beginning 2/27/13. Subsequently discontinued due to identification of metastatic disease.  2. Recurrent/metastatic disease identified 10/8/17:  · Disease involving thoracic spine with cord compression at T6, lumbosacral involvement, sternal and right sternoclavicular involvement.    · Femara discontinued in  10/2017.    · Radiation administered (in the [facility] system) to the thoracic spine beginning 10/19/17 treating T3-T9 to a dose of 24 gray in 6 fractions.  · Evaluation with MRI 12/8/17 showing persistent T6 cord compression with persistent neurologic compromise requiring surgical treatment 12/11/17 with T6 laminectomy/corpectomy and T3-T9 fusion.  Pathology with metastatic carcinoma of breast origin, ER negative, VT negative, HER-2/peter negative (1+ IHC).  · Additional staging evaluation 12/8/17 with no evidence of visceral metastatic disease, bone scan showing involvement of thoracic spine, sternum, left humerus, mid frontal bone.  No plane film correlate of left humerus lesion.  MRI lumbar spine with small intradural L3 metastasis.  CA 15-3 12/6/17- 17.  · Palliative radiation therapy to L3 dural metastasis and left humerus initiated 1/15/18 (10 fractions), completed 1/26/18.  · Hypercalcemia of malignancy with calcium in the 10-11 range.  · Initiation of monthly Xgeva 1/23/18.  · Baseline CT scan 1/30/18 with no evidence of visceral involvement.  Cluster of nodular opacities in the right lower lobe suspected to be infectious or related to bronchiolitis. Bone scan 1/30/18 showed postsurgical change in the thoracic spine, stable uptake in the frontal bone, no new areas of disease.  · Initiation of palliative oral single agent Xeloda 2/7/18 2000 mg a.m., 1500 mg p.m. for 14/21 days.     She is here today, 8/15/2018,  through triage with reports of congestion and postnasal drip.  She has taken Claritin D since calling the office and reports improvement of symptoms.     She restarted her Xeloda this morning as scheduled.     3. Right pulmonary embolism:  · Diagnosed on CT angiogram 10/21/17 involving small right lower lobe pulmonary artery.  Lower extremity Dopplers negative.  · Bilateral lower extremity Dopplers negative again 12/5/17.  · Continuing on Lovenox 1 mg/kg (80 mg) subcutaneous injection every 12 hours.   Lovenox was held for 2 weeks due to right hip hematoma seen on MRI, treatment resumed on 4/6/18 with no further obvious bleeding issues.  4. Cancer related pain:  · Previously receiving Duragesic 50 µg patch every 72 hours along with Dilaudid 4 mg as needed for breakthrough pain  · The patient's pain improved over time and she was able to discontinue both Duragesic and Dilaudid in the interval.  · Patient does take occasional Flexeril at bedtime due to back spasm/pain when she has been more active.  · Right hip pain as noted above due to right gluteal hematoma, previously prescribed Dilaudid 4 mg every 4 hours as needed #60 with no refill.  Pain from hematoma resolved.  · The patient does have some occasional aggravation of her chronic back pain with more significant rehabilitation activity and requires an occasional dose of Dilaudid.  She did not complain of pain today.  5. Chemotherapy-induced diarrhea:  · She reports diarrhea progressively worse over the past week.  She has been taking Imodium which controls this.  Her CMP and magnesium are within normal limits.  6. Traumatic left tibia/fibular fracture:  · Status post ORIF 12/6/17  · Specimen was sent for pathologic review, negative for evidence of malignancy  7. Hypercalcemia:  · Suspect hypercalcemia of malignancy, calcium in  10-11 range previously.  · Calcium normalized following initiation of monthly Xgeva on 1/23/18.  · Last received Xgeva on 8/13 2018.  8. Chemotherapy-induced mucositis:  9. Patient had a minimal degree of mucositis with cycle 2.  The patient has magic mouthwash to use as needed.  No subsequent mucositis.  She did not complain of this today.    10. Recurrent UTI, bladder wall thickening on CT:  · Patient had an enterococcal UTI on 3/2/18 sensitive to nitrofurantoin and received treatment, unclear how long.  · Recurrent UTI 3/20/18 with urine culture growing Klebsiella, initially treated with nitrofurantoin, transitioned to Levaquin.  · CT  4/4/18 with diffuse bladder wall thickening with increased nodular thickening at the left base.  Referral to urogynecology Dr. May Johnson.  She was placed on a prophylactic dose of trimethoprim 100 mg daily, bladder wall thickening felt to be related to recent recurrent urinary tract infections.    · Plans for cystoscopy 8/22/2018.     11. Mobility:  · The patient continues with aggressive outpatient physical therapy at [facility]. She continues to improve considerably and is now able to walk with assistance.  12.  Depression:  · The patient weaned herself off of Cymbalta recently and reports that her symptoms are stable.  13. Conjunctivitis:  · Her  had bacterial conjunctivites about a month ago and she is now started with redness and swelling and discharge in her left eye.  She has been using his eyedrops.  I advised her to discontinue his eyedrops and will start her on Polytrim 1 drop every 4 hours for 5 days.  The purulent drainage has resolved, however, she does continue to exhibit injected sclera bilaterally combined with clear discharge related to congestion.  14. Congestion likely related to allergies or  viral infection?  Her symptoms have improved with Claritin-D.  She is not neutropenic nor does she have a fever.  Chest CT obtained on 08/13/2018 reveals no acute disease process or pneumonia.    Plan:  1. Continue  Xeloda (2000 mg a.m., 1500 mg p.m. for 14/21 days), currently on day 1 of her cycle.   2. Continue Calritin D for symptoms management.  She will call the office should her symptoms worsen or she develop a fever.  3. Continue Lovenox 80 mg every 12 hours.  4. Continue outpatient physical therapy at [facility] as scheduled tomorrow.  5. Return for follow-up visit with Dr. Hancock 8/20/2018 to review her scan results with a CBC, CMP.

## 2018-08-20 ENCOUNTER — LAB (OUTPATIENT)
Dept: OTHER | Facility: HOSPITAL | Age: 58
End: 2018-08-20

## 2018-08-20 ENCOUNTER — OFFICE VISIT (OUTPATIENT)
Dept: ONCOLOGY | Facility: CLINIC | Age: 58
End: 2018-08-20

## 2018-08-20 VITALS
SYSTOLIC BLOOD PRESSURE: 109 MMHG | RESPIRATION RATE: 18 BRPM | DIASTOLIC BLOOD PRESSURE: 74 MMHG | OXYGEN SATURATION: 98 % | TEMPERATURE: 98.1 F | HEIGHT: 61 IN | HEART RATE: 113 BPM

## 2018-08-20 DIAGNOSIS — C50.811 MALIGNANT NEOPLASM OF OVERLAPPING SITES OF RIGHT BREAST IN FEMALE, ESTROGEN RECEPTOR NEGATIVE (HCC): ICD-10-CM

## 2018-08-20 DIAGNOSIS — Z17.1 MALIGNANT NEOPLASM OF OVERLAPPING SITES OF RIGHT BREAST IN FEMALE, ESTROGEN RECEPTOR NEGATIVE (HCC): ICD-10-CM

## 2018-08-20 DIAGNOSIS — C79.51 BONE METASTASES: ICD-10-CM

## 2018-08-20 DIAGNOSIS — C79.51 BONE METASTASES: Primary | ICD-10-CM

## 2018-08-20 DIAGNOSIS — G89.3 CANCER ASSOCIATED PAIN: ICD-10-CM

## 2018-08-20 LAB
ALBUMIN SERPL-MCNC: 4.1 G/DL (ref 3.5–5.2)
ALBUMIN/GLOB SERPL: 1.7 G/DL
ALP SERPL-CCNC: 65 U/L (ref 39–117)
ALT SERPL W P-5'-P-CCNC: 98 U/L (ref 1–33)
ANION GAP SERPL CALCULATED.3IONS-SCNC: 13.2 MMOL/L
AST SERPL-CCNC: 70 U/L (ref 1–32)
BASOPHILS # BLD AUTO: 0.04 10*3/MM3 (ref 0–0.2)
BASOPHILS NFR BLD AUTO: 1.4 % (ref 0–1.5)
BILIRUB SERPL-MCNC: 0.7 MG/DL (ref 0.1–1.2)
BUN BLD-MCNC: 15 MG/DL (ref 6–20)
BUN/CREAT SERPL: 18.5 (ref 7–25)
CALCIUM SPEC-SCNC: 9.6 MG/DL (ref 8.6–10.5)
CHLORIDE SERPL-SCNC: 103 MMOL/L (ref 98–107)
CO2 SERPL-SCNC: 25.8 MMOL/L (ref 22–29)
CREAT BLD-MCNC: 0.81 MG/DL (ref 0.57–1)
DEPRECATED RDW RBC AUTO: 70.7 FL (ref 37–54)
EOSINOPHIL # BLD AUTO: 0.24 10*3/MM3 (ref 0–0.7)
EOSINOPHIL NFR BLD AUTO: 8.5 % (ref 0.3–6.2)
ERYTHROCYTE [DISTWIDTH] IN BLOOD BY AUTOMATED COUNT: 18.6 % (ref 11.7–13)
GFR SERPL CREATININE-BSD FRML MDRD: 73 ML/MIN/1.73
GLOBULIN UR ELPH-MCNC: 2.4 GM/DL
GLUCOSE BLD-MCNC: 140 MG/DL (ref 65–99)
HCT VFR BLD AUTO: 37.5 % (ref 35.6–45.5)
HGB BLD-MCNC: 12.6 G/DL (ref 11.9–15.5)
IMM GRANULOCYTES # BLD: 0.01 10*3/MM3 (ref 0–0.03)
IMM GRANULOCYTES NFR BLD: 0.4 % (ref 0–0.5)
LYMPHOCYTES # BLD AUTO: 0.61 10*3/MM3 (ref 0.9–4.8)
LYMPHOCYTES NFR BLD AUTO: 21.5 % (ref 19.6–45.3)
MCH RBC QN AUTO: 34.1 PG (ref 26.9–32)
MCHC RBC AUTO-ENTMCNC: 33.6 G/DL (ref 32.4–36.3)
MCV RBC AUTO: 101.4 FL (ref 80.5–98.2)
MONOCYTES # BLD AUTO: 0.28 10*3/MM3 (ref 0.2–1.2)
MONOCYTES NFR BLD AUTO: 9.9 % (ref 5–12)
NEUTROPHILS # BLD AUTO: 1.66 10*3/MM3 (ref 1.9–8.1)
NEUTROPHILS NFR BLD AUTO: 58.3 % (ref 42.7–76)
NRBC BLD MANUAL-RTO: 0 /100 WBC (ref 0–0)
PLATELET # BLD AUTO: 221 10*3/MM3 (ref 140–500)
PMV BLD AUTO: 10.7 FL (ref 6–12)
POTASSIUM BLD-SCNC: 4.7 MMOL/L (ref 3.5–5.2)
PROT SERPL-MCNC: 6.5 G/DL (ref 6–8.5)
RBC # BLD AUTO: 3.7 10*6/MM3 (ref 3.9–5.2)
SODIUM BLD-SCNC: 142 MMOL/L (ref 136–145)
WBC NRBC COR # BLD: 2.84 10*3/MM3 (ref 4.5–10.7)

## 2018-08-20 PROCEDURE — 85025 COMPLETE CBC W/AUTO DIFF WBC: CPT | Performed by: INTERNAL MEDICINE

## 2018-08-20 PROCEDURE — 99215 OFFICE O/P EST HI 40 MIN: CPT | Performed by: INTERNAL MEDICINE

## 2018-08-20 PROCEDURE — 36415 COLL VENOUS BLD VENIPUNCTURE: CPT

## 2018-08-20 PROCEDURE — 80053 COMPREHEN METABOLIC PANEL: CPT | Performed by: INTERNAL MEDICINE

## 2018-08-20 RX ORDER — DIPHENOXYLATE HYDROCHLORIDE AND ATROPINE SULFATE 2.5; .025 MG/1; MG/1
1 TABLET ORAL 4 TIMES DAILY PRN
Qty: 60 TABLET | Refills: 2 | Status: SHIPPED | OUTPATIENT
Start: 2018-08-20 | End: 2019-05-28 | Stop reason: SDUPTHER

## 2018-08-20 NOTE — PROGRESS NOTES
Subjective .     REASONS FOR FOLLOWUP:    1. Stage Ib ([id number]) right breast cancer: Diagnosed May 2010 with excisional biopsy for invasive ductal carcinoma, 1.3 cm, grade 2, ER 90%, KS 80%, HER-2/peter negative (1+ IHC).  Subsequent right mastectomy in July 2010 with no residual breast malignancy, 1/5 sentinel lymph node with micrometastasis (0.25 mm).  Treated in the [facility] system with adjuvant AC ×4 cycles in 2010 (no taxanes administered due to underlying Charcot-Saloni-Tooth with peripheral neuropathy).  Adjuvant Femara (postmenopausal) initiated October 2010 with plan to treat ×10 years.  Genetic testing reportedly negative.  Developed osteopenia treated with Prolia beginning 2/27/13.  2. Recurrent/metastatic disease identified 10/8/17 involving thoracic spine with cord compression at T6, lumbosacral involvement, sternal and right sternoclavicular involvement.  Femara discontinued.  Radiation administered (in the [facility] system) to the thoracic spine beginning 10/19/17 treating T3-T9 to a dose of 24 gray in 6 fractions.  3. Evaluation with MRI 12/8/17 showing persistent T6 cord compression with persistent neurologic compromise requiring surgical treatment 12/11/17 with T6 laminectomy/corpectomy and T3-T9 fusion.  Pathology with metastatic carcinoma of breast origin, ER negative, KS negative, HER-2/peter negative (1+ IHC).  4. Right pulmonary embolism 10/21/17 continuing on Lovenox 1 mg/kg (100 mg) every 12 hours.  No evidence of DVT.  Lovenox held due to right gluteus hematoma 3/23/18 through 4/6/18.  5. Cancer related pain previously on Duragesic 50 µg patch every 72 hours and Dilaudid 4 mg as needed for breakthrough pain.  Narcotics subsequently discontinued with improvement in pain in January 2018.  6. Radiation therapy to L3 dural metastasis and left humerus initiated 1/15/18 (10 fractions), completed 1/26/18  7. Monthly Xgeva initiated 1/23/18  8. Mild hypercalcemia of malignancy  9. Initiation of  palliative oral single agent Xeloda 2/7/18 (2000 mg a.m., 1500 mg p.m. for 14/21 days).  10. 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  In reviewing prior CT scans, has been present for some time.  We will monitor for now, if it enlarges consider surgical excision.    HISTORY OF PRESENT ILLNESS:  The patient is a 57 y.o. year old female who is here for follow-up with the above-mentioned history.    History of Present Illness the patient returns today in follow-up due to begin cycle 10 Xeloda and also continuing on monthly Xgeva last received 8/13/18.  She has laboratory studies, CT scan, and bone scan to review. She actually started her 10th cycle of Xeloda on 8/15/18.  Unfortunately, she has had multiple symptoms recently.  Last week she noted significant worsening of fatigue, development of mild anorexia, diarrhea that was not completely relieved with over-the-counter Imodium.  She does note some mild sinus congestion and has been using Claritin over-the-counter which has helped.  She developed some increased tearing.  Over the past few days, she notes that her symptoms have improved considerably.  Her appetite has improved.  Her diarrhea has nearly resolved area she does note some mild erythema of her palms and feet however has not experienced any skin peeling.  She is developing a new wound around her left heel with a small blister where she had a previous wound.  She has not been as active with her rehabilitation due to her recent symptoms.    Past Medical History:   Diagnosis Date   • Acute pulmonary embolism, cancer-related 10/2017   • Allergic rhinitis    • Arthritis     Right knee; left shoulder   • Cancer (CMS/HCC) 05/2010    Right breast   • Cancer (CMS/HCC) 10/2017    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   • Charcot-Saloni-Tooth disease     • CPAP (continuous positive airway pressure) dependence    • GERD (gastroesophageal reflux disease)    • H/O Colon polyps    • H/O Uterine fibroid    • Heart murmur    • Hyperlipidemia    • Hypertension    • PONV (postoperative nausea and vomiting)      Past Surgical History:   Procedure Laterality Date   • BLADDER SURGERY      Bladder lift   • BREAST RECONSTRUCTION, BREAST TISSUE EXPANDER INSERTION Right 2010   • BREAST SURGERY Right 2010    Mastectomy   •  SECTION  ,    • CHOLECYSTECTOMY     • COLONOSCOPY     • HERNIA REPAIR  2015    [facility]   • HYSTERECTOMY      Partial   • KNEE SURGERY Right    • LEG SURGERY Left     Broken   • MASTECTOMY Right    • AK TREAT TIBIAL SHAFT FX, INTRAMED IMPLANT Left 2017    Procedure: TIBIA INTRAMEDULLARY NAIL/DON INSERTION;  Surgeon: Romero Shanks MD;  Location: [facility];  Service: Orthopedics   • THORACIC DECOMPRESSION POSTERIOR FUSION WITH INSTRUMENTATION N/A 2017    Procedure: T6 costotransversectomy and decompression with T3 to  T9 posterior spinal [facility];  Surgeon: Quan Nayak MD;  Location: [facility];  Service:        ONCOLOGIC HISTORY:  (History from previous dates can be found in the separate document.)    Current Outpatient Prescriptions on File Prior to Visit   Medication Sig Dispense Refill   • acetaminophen (TYLENOL) 500 MG tablet Take 500 mg by mouth Every 6 (Six) Hours As Needed for Mild Pain .     • calcium carbonate (OS-CARY) 600 MG tablet Take 600 mg by mouth 2 (Two) Times a Day With Meals.     • capecitabine (XELODA) 500 MG chemo tablet Take 4 tabs (2000 mg) in the AM then take 3 tabs (1500 mg) in the PM for 14 days on then 7 days off. 98 tablet 3   • cholecalciferol (VITAMIN D3) 1000 units tablet Take 1,000 Units by mouth Daily.     • cyclobenzaprine (FLEXERIL) 10 MG tablet Take 1 tablet by mouth 3 (Three) Times a Day As Needed for Muscle  Spasms. 30 tablet 3   • enoxaparin (LOVENOX) 80 MG/0.8ML solution syringe INJECT 0.8 ML UNDER THE SKIN Q 12 H  3   • FLONASE ALLERGY RELIEF 50 MCG/ACT nasal spray 2 sprays into each nostril daily.  11   • gabapentin (NEURONTIN) 300 MG capsule Take 1 capsule by mouth 3 (Three) Times a Day. 90 capsule 2   • HYDROmorphone (DILAUDID) 4 MG tablet Take 1 tablet by mouth Every 4 (Four) Hours As Needed for Moderate Pain . 60 tablet 0   • mesalamine (LIALDA) 1.2 g EC tablet Take 1 tablet by mouth 2 (Two) Times a Day. 180 tablet 1   • omeprazole (PriLOSEC) 40 MG capsule TAKE 1 CAPSULE DAILY 90 capsule 2   • ondansetron ODT (ZOFRAN-ODT) 8 MG disintegrating tablet Take 1 tablet by mouth Every 8 (Eight) Hours As Needed for Nausea or Vomiting. 30 tablet 1   • phenazopyridine (PYRIDIUM) 200 MG tablet Take 200 mg by mouth 3 (Three) Times a Day As Needed for bladder spasms.     • prochlorperazine (COMPAZINE) 10 MG tablet Take 1 tablet by mouth Every 6 (Six) Hours As Needed for Nausea or Vomiting. 30 tablet 0   • sennosides-docusate sodium (SENOKOT-S) 8.6-50 MG tablet Take 2 tablets by mouth 2 (Two) Times a Day. 90 tablet 2   • traMADol (ULTRAM) 50 MG tablet Take 1 tablet by mouth Every 8 (Eight) Hours As Needed for Moderate Pain . 90 tablet 2   • trimethoprim (TRIMPEX) 100 MG tablet Take 100 mg by mouth Daily.  2   • Tuberculin PPD (APLISOL ID) Inject  into the skin.     • Unable to find SWISH AND SPIT 5 ML PO Q 2 H PRN  0     No current facility-administered medications on file prior to visit.        ALLERGIES:     Allergies   Allergen Reactions   • Hydrocodone Nausea Only   • Morphine And Related Nausea And Vomiting     Social History     Social History   • Marital status:      Spouse name: Murray   • Number of children: 3   • Years of education: College     Occupational History   •  [employer]   •  Retired     Social History Main Topics   • Smoking status: Never Smoker   • Smokeless tobacco: Never Used   • Alcohol use Yes       Comment: social   • Drug use: No   • Sexual activity: Defer     Other Topics Concern   • Not on file     Social History Narrative   • No narrative on file     Family History   Problem Relation Age of Onset   • Heart disease Mother    • Hyperlipidemia Mother    • Hypertension Mother    • Diabetes Father    • Charcot-Saloni-Tooth disease Father    • Heart disease Father    • Hypertension Father    • Heart failure Father    • Diabetes Sister    • Heart disease Sister    • Hypertension Sister    • Heart disease Maternal Aunt    • Scoliosis Maternal Aunt    • Heart disease Maternal Uncle    • Diabetes Maternal Grandmother    • Heart disease Maternal Grandmother    • Hypertension Maternal Grandmother    • Uterine cancer Maternal Grandmother    • Heart disease Maternal Grandfather      Review of Systems   Constitutional: Positive for fatigue. Negative for activity change, appetite change, fever and unexpected weight change.   HENT: Positive for congestion. Negative for mouth sores, nosebleeds, sore throat and voice change.    Eyes: Positive for discharge.   Respiratory: Negative for cough, shortness of breath and wheezing.    Cardiovascular: Negative for chest pain, palpitations and leg swelling.   Gastrointestinal: Positive for diarrhea. Negative for abdominal distention, abdominal pain, blood in stool, constipation, nausea and vomiting.   Endocrine: Negative for cold intolerance and heat intolerance.   Genitourinary: Negative for difficulty urinating, dysuria, frequency and hematuria.   Musculoskeletal: Positive for back pain. Negative for arthralgias, joint swelling and myalgias.   Skin: Positive for rash and wound.   Neurological: Positive for weakness. Negative for dizziness, syncope, light-headedness, numbness and headaches.   Hematological: Negative for adenopathy. Does not bruise/bleed easily.   Psychiatric/Behavioral: Negative for confusion and sleep disturbance. The patient is not nervous/anxious.           Objective      Vitals:    08/20/18 1307   BP: 109/74   Pulse: 113   Resp: 18   Temp: 98.1 °F (36.7 °C)   SpO2: 98%      Current Status 8/20/2018   ECOG score 2   Pain 0/10    Physical Exam   Constitutional: She is oriented to person, place, and time. She appears well-developed and well-nourished.   The patient is currently seated in a wheelchair in no distress.   HENT:   Mouth/Throat: Oropharynx is clear and moist.   Eyes: Conjunctivae are normal.   Neck: No thyromegaly present.   Cardiovascular: Normal rate and regular rhythm.  Exam reveals no gallop and no friction rub.    No murmur heard.  Pulmonary/Chest: Breath sounds normal. No respiratory distress.   Abdominal: Soft. Bowel sounds are normal. She exhibits no distension. There is no tenderness.   Musculoskeletal: She exhibits edema.   Lower extremity braces in place.  Trace bilateral lower extremity edema.   Lymphadenopathy:        Head (right side): No submandibular adenopathy present.     She has no cervical adenopathy.     She has no axillary adenopathy.        Right: No inguinal and no supraclavicular adenopathy present.        Left: No inguinal and no supraclavicular adenopathy present.   Neurological: She is alert and oriented to person, place, and time. No cranial nerve deficit.   Bilateral lower extremity strength, 4+/5   Skin: Skin is warm and dry. No rash noted.   Slight brownish discoloration the palms of hands, no erythema nor skin peeling.  There is a 1 cm erythematous blister lesion in the left heel region   Psychiatric: She has a normal mood and affect. Her behavior is normal.       RECENT LABS:  Hematology WBC   Date Value Ref Range Status   08/20/2018 2.84 (L) 4.50 - 10.70 10*3/mm3 Final     RBC   Date Value Ref Range Status   08/20/2018 3.70 (L) 3.90 - 5.20 10*6/mm3 Final     Hemoglobin   Date Value Ref Range Status   08/20/2018 12.6 11.9 - 15.5 g/dL Final     Hematocrit   Date Value Ref Range Status   08/20/2018 37.5 35.6 - 45.5 %  Final     Platelets   Date Value Ref Range Status   08/20/2018 221 140 - 500 10*3/mm3 Final        Lab Results   Component Value Date    GLUCOSE 140 (H) 08/20/2018    BUN 15 08/20/2018    CREATININE 0.81 08/20/2018    EGFRIFNONA 73 08/20/2018    EGFRIFAFRI 80 09/25/2017    BCR 18.5 08/20/2018    K 4.7 08/20/2018    CO2 25.8 08/20/2018    CALCIUM 9.6 08/20/2018    PROTENTOTREF 6.4 09/25/2017    ALBUMIN 4.10 08/20/2018    LABIL2 2.2 09/25/2017    AST 70 (H) 08/20/2018    ALT 98 (H) 08/20/2018     CT chest abdomen pelvis 8/13/18: I did personally review CT images.  IMPRESSION:  1.  Interval decrease in the subcutaneous nodule within the right  posterolateral chest wall. Multiple small nodules within the anterior  abdominal wall are associated with foci of air and are favored to  represent injection granulomas. Again attention on follow-up imaging is  recommended to rule out metastases.  2.  Cluster of nodules within the right lower lobe are unchanged.  3.  Diffuse hepatic steatosis. Multiple hyperenhancing subcapsular areas  within the liver are favored to represent perfusion anomalies. Attention  on follow-up imaging is recommended.    Bone scan 8/13/18:  FINDINGS: Normal genitourinary activity is observed. The pattern of  abnormal radiotracer uptake in the skeleton appears no different today  than on the previous bone scans. There is a focus of increased uptake  along the anterior midline of the skull. Increased uptake is observed in  the thoracic spine where tumor and multilevel fusion had been previously  demonstrated. On the anterior images, there is some vague uptake in the  sternum which could be due to shine through from the spine but the CT  shows sclerosis in the sternum and the lower manubrium. There is also  some radiotracer activity along the posterior right ilium, representing  bone graft harvest site. No new area of abnormal uptake is present.  IMPRESSION:  Stable bone scan. Multiple areas of abnormal  activity are no  different than on previous imaging. There is no new abnormality.    Assessment/Plan      1. Previous Stage Ib ([id number]) right breast cancer:  · Diagnosed May 2010 with excisional biopsy for invasive ductal carcinoma, 1.3 cm, grade 2, ER 90%, TX 80%, HER-2/peter negative (1+ IHC).    · Subsequent right mastectomy in July 2010 with no residual breast malignancy, 1/5 sentinel lymph node with micrometastasis (0.25 mm).    · Treated in the [facility] system with adjuvant AC ×4 cycles in 2010 (no taxanes administered due to underlying Charcot-Saloni-Tooth with peripheral neuropathy).    · Adjuvant Femara (postmenopausal) initiated October 2010 with plan to treat ×10 years.    · Genetic testing reportedly negative.    · Developed osteopenia treated with Prolia beginning 2/27/13. Subsequently discontinued due to identification of metastatic disease.  2. Recurrent/metastatic disease identified 10/8/17:  · Disease involving thoracic spine with cord compression at T6, lumbosacral involvement, sternal and right sternoclavicular involvement.    · Femara discontinued in 10/2017.    · Radiation administered (in the [facility] system) to the thoracic spine beginning 10/19/17 treating T3-T9 to a dose of 24 gray in 6 fractions.  · Evaluation with MRI 12/8/17 showing persistent T6 cord compression with persistent neurologic compromise requiring surgical treatment 12/11/17 with T6 laminectomy/corpectomy and T3-T9 fusion.  Pathology with metastatic carcinoma of breast origin, ER negative, TX negative, HER-2/peter negative (1+ IHC).  · Additional staging evaluation 12/8/17 with no evidence of visceral metastatic disease, bone scan showing involvement of thoracic spine, sternum, left humerus, mid frontal bone.  No plane film correlate of left humerus lesion.  MRI lumbar spine with small intradural L3 metastasis.  CA 15-3 12/6/17- 17.  · Palliative radiation therapy to L3 dural metastasis and left humerus initiated 1/15/18 (10  fractions), completed 1/26/18.  · Hypercalcemia of malignancy with calcium in the 10-11 range.  · Initiation of monthly Xgeva 1/23/18.  · Baseline CT scan 1/30/18 with no evidence of visceral involvement.  Cluster of nodular opacities in the right lower lobe suspected to be infectious or related to bronchiolitis. Bone scan 1/30/18 showed postsurgical change in the thoracic spine, stable uptake in the frontal bone, no new areas of disease.  · Initiation of palliative oral single agent Xeloda 2/7/18 2000 mg a.m., 1500 mg p.m. for 14/21 days.   · Following 3 cycles xeloda, bone scan 4/4/18 showed no change from the prior study.  CT scan 4/4/18 showed a small pericardial effusion of unclear significance as well as a subcutaneous nodule in the right lateral chest wall.  Subsequent evaluation with echocardiogram 4/17/18 showed no evidence of pericardial effusion.  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  We discussed the possibility of surgical excision of the right subcutaneous chest wall lesion for more definitive diagnosis.  Reviewed previous CT images dating back to 12/8/17 and the lesion was present even at that time measuring around 1.7 cm although not commented on in the radiology report.  As this appears to be an indolent low-grade process unrelated to her breast cancer, recommendee foregoing surgical excision at this time and monitoring this area on future scans.  The patient agreed.    · Following 6 cycles of Xeloda, CT 6/6/18  showed stable findings, no evidence of progressive disease.  There was a comment regarding subcutaneous abnormality in the anterior abdominal wall and this was related to Lovenox injection sites.  Bone scan 6/6/18 showed no interval change.   · The patient returns today with CT scan and bone scan to review from 8/13/18 following 9 cycles of  Xeloda.  His scans show stable findings with no evidence of significant visceral metastases.  Her bone lesions appear stable on bone scan.  The spindle cell neoplasm in her right chest wall has actually decreased in size from 2 cm down to 1.6 cm.  She already started her 10th cycle of Xeloda on 8/15/18.  Unfortunately she has had some recent significant symptoms of fatigue, anorexia, diarrhea, increased tearing.  It is unclear whether she may have had a viral gastroenteritis or whether she may be experiencing worsening side effects from Xeloda after prolonged treatment.  She reports feeling much better over the past few days in terms of her appetite and the diarrhea.  The diarrhea was not well-controlled with Imodium and I am prescribing Lomotil as well.  We will continue on with the current cycle.  She was instructed to call if she develops recurrent symptoms.  I will see her back in 3 weeks and she will delay initiating her 11th cycle until she is seen to assess her status.  In the interval, with her diarrhea and a comment on the CT of some thickening in the right: She will be referred back to her gastroenterologist Dr. Ocasio to consider the need for colonoscopy.  She is also developing a recurrent pressure wound in the left heel and I am sending her back to the wound care clinic.  She will be due again for Xgeva when she returns here in 3 weeks as well.  3. Right hip/gluteal hematoma:  · The patient had developed considerable pain in the right hip and underwent MRI 3/22/18 visit showed a 5.7 x 4.3 x 11 cm hematoma in the right gluteal region.  · Lovenox was held 3/23 through 4/6/18.  Resolution of pain, Lovenox was resumed. Hematoma likely occurred due to minimal trauma from transfer out of her wheelchair.   4. Right pulmonary embolism:  · Diagnosed on CT angiogram 10/21/17 involving small right lower lobe pulmonary artery.  Lower extremity Dopplers negative.  · Bilateral lower extremity Dopplers negative again  12/5/17.  · Continuing on Lovenox 1 mg/kg (80 mg) subcutaneous injection every 12 hours.  Lovenox was held for 2 weeks due to right hip hematoma seen on MRI, treatment resumed on 4/6/18 with no further obvious bleeding issues.  5. Cancer related pain:  · Previously receiving Duragesic 50 µg patch every 72 hours along with Dilaudid 4 mg as needed for breakthrough pain  · The patient's pain improved over time and she was able to discontinue both Duragesic and Dilaudid in the interval.  · Patient does take occasional Flexeril at bedtime due to back spasm/pain when she has been more active.  · Right hip pain as noted above due to right gluteal hematoma, previously prescribed Dilaudid 4 mg every 4 hours as needed #60 with no refill.  Pain from hematoma resolved.  · The patient does have some occasional aggravation of her chronic back pain with significant rehabilitation activity and requires an occasional dose of Dilaudid.  6. Chemotherapy-induced diarrhea:  · As outlined above, this did increase over the past 1-2 weeks and was not adequately controlled with Imodium.  It is unclear whether this was related to Xeloda or possibly a viral gastroenteritis.  Currently, her symptoms are improved.  I have sent a prescription for Lomotil to use 4 times daily in addition to Imodium if needed.  She was instructed to call if her diarrhea is not controlled in order to pursue IV fluid support in the office.  7. Traumatic left tibia/fibular fracture:  · Status post ORIF 12/6/17  · Specimen was sent for pathologic review, negative for evidence of malignancy  8. Hypercalcemia:  · Suspect hypercalcemia of malignancy, calcium in  10-11 range previously.  · Calcium normalized following initiation of monthly Xgeva on 1/23/18.  9. Chemotherapy-induced mucositis:  · Patient had a minimal degree of mucositis with cycle 2.  The patient has magic mouthwash to use as needed.  No subsequent mucositis.  10. Recurrent UTI, bladder wall thickening on  CT:  · Patient had an enterococcal UTI on 3/2/18 sensitive to nitrofurantoin and received treatment, unclear how long.  · Recurrent UTI 3/20/18 with urine culture growing Klebsiella, initially treated with nitrofurantoin, transitioned to Levaquin.  · CT 4/4/18 with diffuse bladder wall thickening with increased nodular thickening at the left base.  Referral to urogynecology Dr. May Johnson.  She was placed on a prophylactic dose of trimethoprim 100 mg daily, bladder wall thickening felt to be related to recent recurrent urinary tract infections.  There are no plans for cystoscopy currently.  The patient remains asymptomatic in this regard.  11.   Mobility:  · The patient continues with aggressive outpatient physical therapy at [facility]. She continues to improve considerably and is now able to walk with assistance.  · With her recent symptoms, rehabilitation activities have been somewhat diminished  12.  Depression:  · The patient weaned herself off of Cymbalta recently and reports that her symptoms are stable.  13. Left heel pressure wound:  · The patient previously had a wound in this area.  She now has a erythematous blister lesion in the same area.  I am sending her back to the wound care clinic for evaluation and treatment.  14. 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  She continues to use Eucerin cream multiple times throughout the day.  15. Elevated liver function studies:  · Identified after the patient's visit today with ALT 98, AST 70, previously normal.  · Unclear whether this could be related to Xeloda versus recent viral infection.  We will need to evaluate also for viral hepatitis.  The patient will return in a few days for repeat CMP and we will draw viral hepatitis panel at that time.  Will add CMP onto labs again in 2 weeks.    Plan:  1. Continue cycle 10 Xeloda (2000 mg a.m., 1500 mg p.m. for 14/21 days), initiated  8/15/18.  2. Continue Lovenox 80 mg every 12 hours.  3. Continue outpatient physical therapy at [facility]  4. Refer to wound care clinic regarding left posterior heel pressure wound  5. Referred to prior GI physician Dr. Ocasio to consider colonoscopy in regards to diarrhea and right colon thickening seen on CT  6. In 2 days CBC, CMP, viral hepatitis panel, RN review  7. In 2 weeks CBC, CMP and RN review  8. In 3 weeks M.D. visit CBC, CMP, magnesium, phosphorus.  The patient will be due for Xgeva.  She will not begin cycle 11 Xeloda until she is seen that day.

## 2018-08-21 ENCOUNTER — TELEPHONE (OUTPATIENT)
Dept: ONCOLOGY | Facility: CLINIC | Age: 58
End: 2018-08-21

## 2018-08-21 DIAGNOSIS — C50.811 MALIGNANT NEOPLASM OF OVERLAPPING SITES OF RIGHT BREAST IN FEMALE, ESTROGEN RECEPTOR NEGATIVE (HCC): Primary | ICD-10-CM

## 2018-08-21 DIAGNOSIS — R94.5 ABNORMAL RESULTS OF LIVER FUNCTION STUDIES: ICD-10-CM

## 2018-08-21 DIAGNOSIS — Z17.1 MALIGNANT NEOPLASM OF OVERLAPPING SITES OF RIGHT BREAST IN FEMALE, ESTROGEN RECEPTOR NEGATIVE (HCC): Primary | ICD-10-CM

## 2018-08-21 NOTE — PROGRESS NOTES
Per message from TERESA Patel RN verbal order Dr Hancock patient needs cbc cmp and viral hepatitis panel in 2 days with rn review and cbc cmp in 2 weeks with rn review.

## 2018-08-21 NOTE — TELEPHONE ENCOUNTER
----- Message from Kelly Louis RN sent at 8/21/2018  8:27 AM EDT -----  Needs cbc cmp and viral hepatitis panel in 2 days with rn review. And cbc cmp in 2 weeks with rn review. I have notified  that we would call with and appointment.

## 2018-08-23 ENCOUNTER — INFUSION (OUTPATIENT)
Dept: ONCOLOGY | Facility: HOSPITAL | Age: 58
End: 2018-08-23

## 2018-08-23 ENCOUNTER — APPOINTMENT (OUTPATIENT)
Dept: ONCOLOGY | Facility: HOSPITAL | Age: 58
End: 2018-08-23

## 2018-08-23 ENCOUNTER — LAB (OUTPATIENT)
Dept: LAB | Facility: HOSPITAL | Age: 58
End: 2018-08-23

## 2018-08-23 ENCOUNTER — APPOINTMENT (OUTPATIENT)
Dept: LAB | Facility: HOSPITAL | Age: 58
End: 2018-08-23

## 2018-08-23 VITALS — SYSTOLIC BLOOD PRESSURE: 90 MMHG | HEART RATE: 96 BPM | TEMPERATURE: 98 F | DIASTOLIC BLOOD PRESSURE: 62 MMHG

## 2018-08-23 DIAGNOSIS — R94.5 ABNORMAL RESULTS OF LIVER FUNCTION STUDIES: ICD-10-CM

## 2018-08-23 DIAGNOSIS — C50.811 MALIGNANT NEOPLASM OF OVERLAPPING SITES OF RIGHT BREAST IN FEMALE, ESTROGEN RECEPTOR NEGATIVE (HCC): ICD-10-CM

## 2018-08-23 DIAGNOSIS — Z17.1 MALIGNANT NEOPLASM OF OVERLAPPING SITES OF RIGHT BREAST IN FEMALE, ESTROGEN RECEPTOR NEGATIVE (HCC): ICD-10-CM

## 2018-08-23 LAB
ALBUMIN SERPL-MCNC: 4.1 G/DL (ref 3.5–5.2)
ALBUMIN/GLOB SERPL: 1.8 G/DL (ref 1.1–2.4)
ALP SERPL-CCNC: 57 U/L (ref 38–116)
ALT SERPL W P-5'-P-CCNC: 59 U/L (ref 0–33)
ANION GAP SERPL CALCULATED.3IONS-SCNC: 12.8 MMOL/L
AST SERPL-CCNC: 28 U/L (ref 0–32)
BASOPHILS # BLD AUTO: 0.02 10*3/MM3 (ref 0–0.1)
BASOPHILS NFR BLD AUTO: 0.7 % (ref 0–1.1)
BILIRUB SERPL-MCNC: 0.7 MG/DL (ref 0.1–1.2)
BUN BLD-MCNC: 17 MG/DL (ref 6–20)
BUN/CREAT SERPL: 21.5 (ref 7.3–30)
CALCIUM SPEC-SCNC: 9.2 MG/DL (ref 8.5–10.2)
CHLORIDE SERPL-SCNC: 102 MMOL/L (ref 98–107)
CO2 SERPL-SCNC: 27.2 MMOL/L (ref 22–29)
CREAT BLD-MCNC: 0.79 MG/DL (ref 0.6–1.1)
DEPRECATED RDW RBC AUTO: 69.9 FL (ref 37–49)
EOSINOPHIL # BLD AUTO: 0.42 10*3/MM3 (ref 0–0.36)
EOSINOPHIL NFR BLD AUTO: 13.7 % (ref 1–5)
ERYTHROCYTE [DISTWIDTH] IN BLOOD BY AUTOMATED COUNT: 18.4 % (ref 11.7–14.5)
GFR SERPL CREATININE-BSD FRML MDRD: 75 ML/MIN/1.73
GLOBULIN UR ELPH-MCNC: 2.3 GM/DL (ref 1.8–3.5)
GLUCOSE BLD-MCNC: 120 MG/DL (ref 74–124)
HAV IGM SERPL QL IA: NORMAL
HBV CORE IGM SERPL QL IA: NORMAL
HBV SURFACE AG SERPL QL IA: NORMAL
HCT VFR BLD AUTO: 36.2 % (ref 34–45)
HCV AB SER DONR QL: NORMAL
HGB BLD-MCNC: 11.8 G/DL (ref 11.5–14.9)
IMM GRANULOCYTES # BLD: 0.01 10*3/MM3 (ref 0–0.03)
IMM GRANULOCYTES NFR BLD: 0.3 % (ref 0–0.5)
LYMPHOCYTES # BLD AUTO: 0.65 10*3/MM3 (ref 1–3.5)
LYMPHOCYTES NFR BLD AUTO: 21.2 % (ref 20–49)
MCH RBC QN AUTO: 33.9 PG (ref 27–33)
MCHC RBC AUTO-ENTMCNC: 32.6 G/DL (ref 32–35)
MCV RBC AUTO: 104 FL (ref 83–97)
MONOCYTES # BLD AUTO: 0.24 10*3/MM3 (ref 0.25–0.8)
MONOCYTES NFR BLD AUTO: 7.8 % (ref 4–12)
NEUTROPHILS # BLD AUTO: 1.72 10*3/MM3 (ref 1.5–7)
NEUTROPHILS NFR BLD AUTO: 56.3 % (ref 39–75)
NRBC BLD MANUAL-RTO: 0 /100 WBC (ref 0–0)
PLATELET # BLD AUTO: 209 10*3/MM3 (ref 150–375)
PMV BLD AUTO: 11.2 FL (ref 8.9–12.1)
POTASSIUM BLD-SCNC: 4.5 MMOL/L (ref 3.5–4.7)
PROT SERPL-MCNC: 6.4 G/DL (ref 6.3–8)
RBC # BLD AUTO: 3.48 10*6/MM3 (ref 3.9–5)
SODIUM BLD-SCNC: 142 MMOL/L (ref 134–145)
WBC NRBC COR # BLD: 3.06 10*3/MM3 (ref 4–10)

## 2018-08-23 PROCEDURE — 85025 COMPLETE CBC W/AUTO DIFF WBC: CPT | Performed by: INTERNAL MEDICINE

## 2018-08-23 PROCEDURE — 36415 COLL VENOUS BLD VENIPUNCTURE: CPT | Performed by: INTERNAL MEDICINE

## 2018-08-23 PROCEDURE — 80053 COMPREHEN METABOLIC PANEL: CPT | Performed by: INTERNAL MEDICINE

## 2018-08-23 PROCEDURE — 80074 ACUTE HEPATITIS PANEL: CPT | Performed by: INTERNAL MEDICINE

## 2018-08-23 NOTE — PROGRESS NOTES
"CBC reviewed with pt and . Counts stable. VSS. Pt is currently on day 9 of Xeloda. She had noticed a patchy rash start a few days ago on her tops of her hands, her underarms, and slightly on her forearms. The rash appears reddened and raised bumps. She does report they are slightly itchy. She has a cream at home for \"blisters\" but cannot remember the name. Reviewed with Nani Bedolla, NP. Per Nani, have pt try hydrocortisone cream on affected areas. Informed pt and she v/u. CMP still pending from today. Advised pt that we would call her with any abnormalities. She v/u. CMP resulted and counts improved from earlier this week.     Lab Results   Component Value Date    WBC 3.06 (L) 08/23/2018    HGB 11.8 08/23/2018    HCT 36.2 08/23/2018    .0 (H) 08/23/2018     08/23/2018     Lab Results   Component Value Date    GLUCOSE 120 08/23/2018    BUN 17 08/23/2018    CREATININE 0.79 08/23/2018    EGFRIFNONA 75 08/23/2018    EGFRIFAFRI 80 09/25/2017    BCR 21.5 08/23/2018    K 4.5 08/23/2018    CO2 27.2 08/23/2018    CALCIUM 9.2 08/23/2018    PROTENTOTREF 6.4 09/25/2017    ALBUMIN 4.10 08/23/2018    LABIL2 2.2 09/25/2017    AST 28 08/23/2018    ALT 59 (H) 08/23/2018     "

## 2018-08-27 ENCOUNTER — TELEPHONE (OUTPATIENT)
Dept: ONCOLOGY | Facility: HOSPITAL | Age: 58
End: 2018-08-27

## 2018-08-27 ENCOUNTER — INFUSION (OUTPATIENT)
Dept: ONCOLOGY | Facility: HOSPITAL | Age: 58
End: 2018-08-27

## 2018-08-27 ENCOUNTER — OFFICE VISIT (OUTPATIENT)
Dept: ONCOLOGY | Facility: CLINIC | Age: 58
End: 2018-08-27

## 2018-08-27 ENCOUNTER — APPOINTMENT (OUTPATIENT)
Dept: OTHER | Facility: HOSPITAL | Age: 58
End: 2018-08-27

## 2018-08-27 VITALS
SYSTOLIC BLOOD PRESSURE: 130 MMHG | HEIGHT: 61 IN | TEMPERATURE: 97.8 F | RESPIRATION RATE: 16 BRPM | DIASTOLIC BLOOD PRESSURE: 78 MMHG | HEART RATE: 68 BPM

## 2018-08-27 DIAGNOSIS — R19.7 DIARRHEA, UNSPECIFIED TYPE: ICD-10-CM

## 2018-08-27 DIAGNOSIS — R53.1 WEAKNESS: ICD-10-CM

## 2018-08-27 DIAGNOSIS — C79.51 BONE METASTASES: ICD-10-CM

## 2018-08-27 DIAGNOSIS — R11.0 NAUSEA: ICD-10-CM

## 2018-08-27 DIAGNOSIS — Z17.1 MALIGNANT NEOPLASM OF OVERLAPPING SITES OF RIGHT BREAST IN FEMALE, ESTROGEN RECEPTOR NEGATIVE (HCC): Primary | ICD-10-CM

## 2018-08-27 DIAGNOSIS — R11.0 NAUSEA: Primary | ICD-10-CM

## 2018-08-27 DIAGNOSIS — R30.0 DYSURIA: ICD-10-CM

## 2018-08-27 DIAGNOSIS — C50.811 MALIGNANT NEOPLASM OF OVERLAPPING SITES OF RIGHT BREAST IN FEMALE, ESTROGEN RECEPTOR NEGATIVE (HCC): Primary | ICD-10-CM

## 2018-08-27 LAB
ALBUMIN SERPL-MCNC: 4.1 G/DL (ref 3.5–5.2)
ALBUMIN/GLOB SERPL: 1.8 G/DL
ALP SERPL-CCNC: 68 U/L (ref 39–117)
ALT SERPL W P-5'-P-CCNC: 45 U/L (ref 1–33)
ANION GAP SERPL CALCULATED.3IONS-SCNC: 14.5 MMOL/L
AST SERPL-CCNC: 36 U/L (ref 1–32)
BACTERIA UR QL AUTO: ABNORMAL /HPF
BASOPHILS # BLD AUTO: 0.03 10*3/MM3 (ref 0–0.2)
BASOPHILS NFR BLD AUTO: 0.4 % (ref 0–1.5)
BILIRUB SERPL-MCNC: 0.6 MG/DL (ref 0.1–1.2)
BILIRUB UR QL STRIP: ABNORMAL
BUN BLD-MCNC: 10 MG/DL (ref 6–20)
BUN/CREAT SERPL: 13 (ref 7–25)
CALCIUM SPEC-SCNC: 8.2 MG/DL (ref 8.6–10.5)
CHLORIDE SERPL-SCNC: 103 MMOL/L (ref 98–107)
CLARITY UR: ABNORMAL
CO2 SERPL-SCNC: 24.5 MMOL/L (ref 22–29)
COLOR UR: ABNORMAL
CREAT BLD-MCNC: 0.77 MG/DL (ref 0.57–1)
DEPRECATED RDW RBC AUTO: 66.3 FL (ref 37–54)
EOSINOPHIL # BLD AUTO: 0.44 10*3/MM3 (ref 0–0.7)
EOSINOPHIL NFR BLD AUTO: 5.3 % (ref 0.3–6.2)
ERYTHROCYTE [DISTWIDTH] IN BLOOD BY AUTOMATED COUNT: 18.3 % (ref 11.7–13)
GFR SERPL CREATININE-BSD FRML MDRD: 77 ML/MIN/1.73
GLOBULIN UR ELPH-MCNC: 2.3 GM/DL
GLUCOSE BLD-MCNC: 126 MG/DL (ref 65–99)
GLUCOSE UR STRIP-MCNC: ABNORMAL MG/DL
HCT VFR BLD AUTO: 35.1 % (ref 35.6–45.5)
HGB BLD-MCNC: 12 G/DL (ref 11.9–15.5)
HGB UR QL STRIP.AUTO: ABNORMAL
HYALINE CASTS UR QL AUTO: ABNORMAL /LPF
IMM GRANULOCYTES # BLD: 0.06 10*3/MM3 (ref 0–0.03)
IMM GRANULOCYTES NFR BLD: 0.7 % (ref 0–0.5)
KETONES UR QL STRIP: ABNORMAL
LEUKOCYTE ESTERASE UR QL STRIP.AUTO: ABNORMAL
LYMPHOCYTES # BLD AUTO: 0.64 10*3/MM3 (ref 0.9–4.8)
LYMPHOCYTES NFR BLD AUTO: 7.7 % (ref 19.6–45.3)
MAGNESIUM SERPL-MCNC: 1.5 MG/DL (ref 1.6–2.6)
MCH RBC QN AUTO: 34.3 PG (ref 26.9–32)
MCHC RBC AUTO-ENTMCNC: 34.2 G/DL (ref 32.4–36.3)
MCV RBC AUTO: 100.3 FL (ref 80.5–98.2)
MONOCYTES # BLD AUTO: 0.8 10*3/MM3 (ref 0.2–1.2)
MONOCYTES NFR BLD AUTO: 9.6 % (ref 5–12)
NEUTROPHILS # BLD AUTO: 6.34 10*3/MM3 (ref 1.9–8.1)
NEUTROPHILS NFR BLD AUTO: 76.3 % (ref 42.7–76)
NITRITE UR QL STRIP: POSITIVE
NRBC BLD MANUAL-RTO: 0.2 /100 WBC (ref 0–0)
PH UR STRIP.AUTO: 5.5 [PH] (ref 5–8)
PLATELET # BLD AUTO: 273 10*3/MM3 (ref 140–500)
PMV BLD AUTO: 10.6 FL (ref 6–12)
POTASSIUM BLD-SCNC: 3.5 MMOL/L (ref 3.5–5.2)
PROT SERPL-MCNC: 6.4 G/DL (ref 6–8.5)
PROT UR QL STRIP: ABNORMAL
RBC # BLD AUTO: 3.5 10*6/MM3 (ref 3.9–5.2)
RBC # UR: ABNORMAL /HPF
REF LAB TEST METHOD: ABNORMAL
SODIUM BLD-SCNC: 142 MMOL/L (ref 136–145)
SP GR UR STRIP: 1.02 (ref 1–1.03)
SQUAMOUS #/AREA URNS HPF: ABNORMAL /HPF
UROBILINOGEN UR QL STRIP: ABNORMAL
WBC NRBC COR # BLD: 8.31 10*3/MM3 (ref 4.5–10.7)
WBC UR QL AUTO: ABNORMAL /HPF

## 2018-08-27 PROCEDURE — 81001 URINALYSIS AUTO W/SCOPE: CPT | Performed by: NURSE PRACTITIONER

## 2018-08-27 PROCEDURE — 96365 THER/PROPH/DIAG IV INF INIT: CPT | Performed by: NURSE PRACTITIONER

## 2018-08-27 PROCEDURE — 99215 OFFICE O/P EST HI 40 MIN: CPT | Performed by: NURSE PRACTITIONER

## 2018-08-27 PROCEDURE — 80053 COMPREHEN METABOLIC PANEL: CPT | Performed by: NURSE PRACTITIONER

## 2018-08-27 PROCEDURE — 83735 ASSAY OF MAGNESIUM: CPT | Performed by: NURSE PRACTITIONER

## 2018-08-27 PROCEDURE — 25010000002 MAGNESIUM SULFATE PER 500 MG OF MAGNESIUM: Performed by: NURSE PRACTITIONER

## 2018-08-27 PROCEDURE — 85025 COMPLETE CBC W/AUTO DIFF WBC: CPT | Performed by: NURSE PRACTITIONER

## 2018-08-27 PROCEDURE — 96366 THER/PROPH/DIAG IV INF ADDON: CPT | Performed by: NURSE PRACTITIONER

## 2018-08-27 RX ORDER — LEVOFLOXACIN 500 MG/1
500 TABLET, FILM COATED ORAL DAILY
Qty: 5 TABLET | Refills: 0 | Status: SHIPPED | OUTPATIENT
Start: 2018-08-27 | End: 2018-09-01

## 2018-08-27 RX ADMIN — MAGNESIUM SULFATE HEPTAHYDRATE: 500 INJECTION, SOLUTION INTRAMUSCULAR; INTRAVENOUS at 14:09

## 2018-08-27 NOTE — PROGRESS NOTES
Subjective .     REASONS FOR FOLLOWUP:    1. Stage Ib ([id number]) right breast cancer: Diagnosed May 2010 with excisional biopsy for invasive ductal carcinoma, 1.3 cm, grade 2, ER 90%, IN 80%, HER-2/peter negative (1+ IHC).  Subsequent right mastectomy in July 2010 with no residual breast malignancy, 1/5 sentinel lymph node with micrometastasis (0.25 mm).  Treated in the [facility] system with adjuvant AC ×4 cycles in 2010 (no taxanes administered due to underlying Charcot-Saloni-Tooth with peripheral neuropathy).  Adjuvant Femara (postmenopausal) initiated October 2010 with plan to treat ×10 years.  Genetic testing reportedly negative.  Developed osteopenia treated with Prolia beginning 2/27/13.  2. Recurrent/metastatic disease identified 10/8/17 involving thoracic spine with cord compression at T6, lumbosacral involvement, sternal and right sternoclavicular involvement.  Femara discontinued.  Radiation administered (in the [facility] system) to the thoracic spine beginning 10/19/17 treating T3-T9 to a dose of 24 gray in 6 fractions.  3. Evaluation with MRI 12/8/17 showing persistent T6 cord compression with persistent neurologic compromise requiring surgical treatment 12/11/17 with T6 laminectomy/corpectomy and T3-T9 fusion.  Pathology with metastatic carcinoma of breast origin, ER negative, IN negative, HER-2/peter negative (1+ IHC).  4. Right pulmonary embolism 10/21/17 continuing on Lovenox 1 mg/kg (100 mg) every 12 hours.  No evidence of DVT.  Lovenox held due to right gluteus hematoma 3/23/18 through 4/6/18.  5. Cancer related pain previously on Duragesic 50 µg patch every 72 hours and Dilaudid 4 mg as needed for breakthrough pain.  Narcotics subsequently discontinued with improvement in pain in January 2018.  6. Radiation therapy to L3 dural metastasis and left humerus initiated 1/15/18 (10 fractions), completed 1/26/18  7. Monthly Xgeva initiated 1/23/18  8. Mild hypercalcemia of malignancy  9. Initiation of  palliative oral single agent Xeloda 2/7/18 (2000 mg a.m., 1500 mg p.m. for 14/21 days).  10. 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  In reviewing prior CT scans, has been present for some time.  We will monitor for now, if it enlarges consider surgical excision.    HISTORY OF PRESENT ILLNESS:  The patient is a 57 y.o. year old female who is here for follow-up with the above-mentioned history.    Diarrhea    Pertinent negatives include no abdominal pain, arthralgias, coughing, fever, headaches, myalgias or vomiting.   Fatigue   Associated symptoms include fatigue, nausea (mild, relieved with zofran ), a rash (a few lesion on bilateral arms, non-pruritic, erythema of hands and feet ) and weakness. Pertinent negatives include no abdominal pain, arthralgias, chest pain, congestion, coughing, fever, headaches, joint swelling, myalgias, numbness, sore throat or vomiting.   The patient returns today for a triage visit. She has unfortunately had recurrence of diarrhea and nausea for the last 2 days, which was previously felt to be related to a viral gastroenteritis. She has taken imodium, but has not attempted the Lomotil which she received from Dr. Hancock at her last office visit.      She is currently on her 20th/21st day of xeloda, cycle #10. She has not taken the Xeloda the last two days due to the diarrhea. She is quite weak and fatigued with associated diminished appetite.  She is attempting to drink water, but admits to only consuming a total of 24 ounces over the past 2 days.     She did have some areas of erythema on her hands and feet without peeling or skin sloughing. She is continuing to utilize Eucerin topically several times a day. The blister on her right foot from a previous injury will be evaluated on September 7th by wound care.      Epiphora has persisted, though patient does have a history of seasonal allergies, which she feels is contributing.     Finally, she feels that she is developing another urinary tract infection with dysuria and frequency. She denies any associated fevers or chills.  Of note, she was recently evaluated by Dr. May Johnson, of urogynecology;  and underwent a cystoscopy which was ultimately benign. She was placed on Trimethoprim 100 mg daily and was asked to follow up in 6 months.     Overall, patient is generally fatigued and weak related to ongoing diarrhea over the last several days and is hopeful that she may be able to proceed with a reduced dose of Xeloda with her next cycle.     Past Medical History:   Diagnosis Date   • Acute pulmonary embolism, cancer-related 10/2017   • Allergic rhinitis    • Arthritis     Right knee; left shoulder   • Cancer (CMS/Formerly [facility]) 2010    Right breast   • Cancer (CMS/Formerly [facility]) 10/2017    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   • Charcot-Saloni-Tooth disease    • CPAP (continuous positive airway pressure) dependence    • GERD (gastroesophageal reflux disease)    • H/O Colon polyps    • H/O Uterine fibroid    • Heart murmur    • Hyperlipidemia    • Hypertension    • PONV (postoperative nausea and vomiting)      Past Surgical History:   Procedure Laterality Date   • BLADDER SURGERY      Bladder lift   • BREAST RECONSTRUCTION, BREAST TISSUE EXPANDER INSERTION Right    • BREAST SURGERY Right 2010    Mastectomy   •  SECTION  ,    • CHOLECYSTECTOMY     • COLONOSCOPY     • HERNIA REPAIR  2015    [facility]   • HYSTERECTOMY      Partial   • KNEE SURGERY Right    • LEG SURGERY Left     Broken   • MASTECTOMY Right    • NM TREAT TIBIAL SHAFT FX, INTRAMED IMPLANT Left 2017    Procedure: TIBIA INTRAMEDULLARY NAIL/DON INSERTION;  Surgeon: Romero Shanks MD;  Location: [facility];  Service: Orthopedics   • THORACIC DECOMPRESSION POSTERIOR FUSION WITH  INSTRUMENTATION N/A 12/11/2017    Procedure: T6 costotransversectomy and decompression with T3 to  T9 posterior spinal fusion with instrumentation with Jennifer Gonzalez and Nael Wilkes;  Surgeon: Quan Nayak MD;  Location: [facility] OR;  Service:        ONCOLOGIC HISTORY:  (History from previous dates can be found in the separate document.)    Current Outpatient Prescriptions on File Prior to Visit   Medication Sig Dispense Refill   • acetaminophen (TYLENOL) 500 MG tablet Take 500 mg by mouth Every 6 (Six) Hours As Needed for Mild Pain .     • calcium carbonate (OS-CARY) 600 MG tablet Take 600 mg by mouth 2 (Two) Times a Day With Meals.     • capecitabine (XELODA) 500 MG chemo tablet Take 4 tabs (2000 mg) in the AM then take 3 tabs (1500 mg) in the PM for 14 days on then 7 days off. 98 tablet 3   • cholecalciferol (VITAMIN D3) 1000 units tablet Take 1,000 Units by mouth Daily.     • cyclobenzaprine (FLEXERIL) 10 MG tablet Take 1 tablet by mouth 3 (Three) Times a Day As Needed for Muscle Spasms. 30 tablet 3   • diphenoxylate-atropine (LOMOTIL) 2.5-0.025 MG per tablet Take 1 tablet by mouth 4 (Four) Times a Day As Needed for Diarrhea. 60 tablet 2   • enoxaparin (LOVENOX) 80 MG/0.8ML solution syringe INJECT 0.8 ML UNDER THE SKIN Q 12 H  3   • FLONASE ALLERGY RELIEF 50 MCG/ACT nasal spray 2 sprays into each nostril daily.  11   • gabapentin (NEURONTIN) 300 MG capsule Take 1 capsule by mouth 3 (Three) Times a Day. 90 capsule 2   • HYDROmorphone (DILAUDID) 4 MG tablet Take 1 tablet by mouth Every 4 (Four) Hours As Needed for Moderate Pain . 60 tablet 0   • mesalamine (LIALDA) 1.2 g EC tablet Take 1 tablet by mouth 2 (Two) Times a Day. 180 tablet 1   • omeprazole (PriLOSEC) 40 MG capsule TAKE 1 CAPSULE DAILY 90 capsule 2   • ondansetron ODT (ZOFRAN-ODT) 8 MG disintegrating tablet Take 1 tablet by mouth Every 8 (Eight) Hours As Needed for Nausea or Vomiting. 30 tablet 1   • phenazopyridine (PYRIDIUM) 200 MG tablet  Take 200 mg by mouth 3 (Three) Times a Day As Needed for bladder spasms.     • prochlorperazine (COMPAZINE) 10 MG tablet Take 1 tablet by mouth Every 6 (Six) Hours As Needed for Nausea or Vomiting. 30 tablet 0   • sennosides-docusate sodium (SENOKOT-S) 8.6-50 MG tablet Take 2 tablets by mouth 2 (Two) Times a Day. 90 tablet 2   • traMADol (ULTRAM) 50 MG tablet Take 1 tablet by mouth Every 8 (Eight) Hours As Needed for Moderate Pain . 90 tablet 2   • trimethoprim (TRIMPEX) 100 MG tablet Take 100 mg by mouth Daily.  2   • Tuberculin PPD (APLISOL ID) Inject  into the skin.     • Unable to find SWISH AND SPIT 5 ML PO Q 2 H PRN  0     No current facility-administered medications on file prior to visit.        ALLERGIES:     Allergies   Allergen Reactions   • Hydrocodone Nausea Only   • Morphine And Related Nausea And Vomiting     Social History     Social History   • Marital status:      Spouse name: Murray   • Number of children: 3   • Years of education: College     Occupational History   •  [employer]   •  Retired     Social History Main Topics   • Smoking status: Never Smoker   • Smokeless tobacco: Never Used   • Alcohol use Yes      Comment: social   • Drug use: No   • Sexual activity: Defer     Other Topics Concern   • Not on file     Social History Narrative   • No narrative on file     Family History   Problem Relation Age of Onset   • Heart disease Mother    • Hyperlipidemia Mother    • Hypertension Mother    • Diabetes Father    • Charcot-Saloni-Tooth disease Father    • Heart disease Father    • Hypertension Father    • Heart failure Father    • Diabetes Sister    • Heart disease Sister    • Hypertension Sister    • Heart disease Maternal Aunt    • Scoliosis Maternal Aunt    • Heart disease Maternal Uncle    • Diabetes Maternal Grandmother    • Heart disease Maternal Grandmother    • Hypertension Maternal Grandmother    • Uterine cancer Maternal Grandmother    • Heart disease Maternal Grandfather       Review of Systems   Constitutional: Positive for fatigue. Negative for activity change, appetite change, fever and unexpected weight change.   HENT: Negative for congestion, mouth sores, nosebleeds, sore throat and voice change.    Eyes: Positive for discharge (ongoing, slightly worse ) and redness (mild ). Negative for visual disturbance.   Respiratory: Negative for cough, shortness of breath and wheezing.    Cardiovascular: Negative for chest pain, palpitations and leg swelling.   Gastrointestinal: Positive for diarrhea (recurred- 4-6 episodes over the last 2 days ) and nausea (mild, relieved with zofran ). Negative for abdominal distention, abdominal pain, blood in stool, constipation and vomiting.   Endocrine: Negative for cold intolerance and heat intolerance.   Genitourinary: Positive for dysuria and frequency. Negative for difficulty urinating and hematuria.   Musculoskeletal: Negative for arthralgias, back pain, joint swelling and myalgias.   Skin: Positive for rash (a few lesion on bilateral arms, non-pruritic, erythema of hands and feet ) and wound.   Neurological: Positive for weakness. Negative for dizziness, syncope, light-headedness, numbness and headaches.   Hematological: Negative for adenopathy. Does not bruise/bleed easily.   Psychiatric/Behavioral: Negative for confusion and sleep disturbance. The patient is not nervous/anxious.          Objective      Vitals:    08/27/18 1311   BP: 130/78   Pulse: 68   Resp: 16   Temp: 97.8 °F (36.6 °C)      Current Status 8/27/2018   ECOG score 1   Pain 0/10    Physical Exam   Constitutional: She is oriented to person, place, and time. She appears well-developed and well-nourished.   The patient is currently seated in a wheelchair in no distress.   HENT:   Mouth/Throat: Oropharynx is clear and moist.   Eyes: Conjunctivae are normal.   Neck: No thyromegaly present.   Cardiovascular: Normal rate and regular rhythm.  Exam reveals no gallop and no friction rub.     No murmur heard.  Pulmonary/Chest: Breath sounds normal. No respiratory distress.   Abdominal: Soft. Bowel sounds are normal. She exhibits no distension. There is no tenderness.   Musculoskeletal: She exhibits edema.   Lower extremity braces in place.  Trace bilateral lower extremity edema.- unchanged today    Lymphadenopathy:        Head (right side): No submandibular adenopathy present.     She has no cervical adenopathy.     She has no axillary adenopathy.        Right: No inguinal and no supraclavicular adenopathy present.        Left: No inguinal and no supraclavicular adenopathy present.   Neurological: She is alert and oriented to person, place, and time. No cranial nerve deficit.   Bilateral lower extremity strength, 4+/5   Skin: Skin is warm and dry. No rash noted.   Slight brownish discoloration the palms of hands, no erythema nor skin peeling.  There is a 1 cm erythematous blister lesion in the left heel region- unchanged today. No evidence of skin peeling or warmth to palpation    Psychiatric: She has a normal mood and affect. Her behavior is normal.       RECENT LABS:  Hematology WBC   Date Value Ref Range Status   08/27/2018 8.31 4.50 - 10.70 10*3/mm3 Final     RBC   Date Value Ref Range Status   08/27/2018 3.50 (L) 3.90 - 5.20 10*6/mm3 Final     Hemoglobin   Date Value Ref Range Status   08/27/2018 12.0 11.9 - 15.5 g/dL Final     Hematocrit   Date Value Ref Range Status   08/27/2018 35.1 (L) 35.6 - 45.5 % Final     Platelets   Date Value Ref Range Status   08/27/2018 273 140 - 500 10*3/mm3 Final        Lab Results   Component Value Date    GLUCOSE 126 (H) 08/27/2018    BUN 10 08/27/2018    CREATININE 0.77 08/27/2018    EGFRIFNONA 77 08/27/2018    EGFRIFAFRI 80 09/25/2017    BCR 13.0 08/27/2018    K 3.5 08/27/2018    CO2 24.5 08/27/2018    CALCIUM 8.2 (L) 08/27/2018    PROTENTOTREF 6.4 09/25/2017    ALBUMIN 4.10 08/27/2018    LABIL2 2.2 09/25/2017    AST 36 (H) 08/27/2018    ALT 45 (H) 08/27/2018      CT chest abdomen pelvis 8/13/18: Dr. Hancock did personally review CT images.  IMPRESSION:  1.  Interval decrease in the subcutaneous nodule within the right  posterolateral chest wall. Multiple small nodules within the anterior  abdominal wall are associated with foci of air and are favored to  represent injection granulomas. Again attention on follow-up imaging is  recommended to rule out metastases.  2.  Cluster of nodules within the right lower lobe are unchanged.  3.  Diffuse hepatic steatosis. Multiple hyperenhancing subcapsular areas  within the liver are favored to represent perfusion anomalies. Attention  on follow-up imaging is recommended.    Bone scan 8/13/18:  FINDINGS: Normal genitourinary activity is observed. The pattern of  abnormal radiotracer uptake in the skeleton appears no different today  than on the previous bone scans. There is a focus of increased uptake  along the anterior midline of the skull. Increased uptake is observed in  the thoracic spine where tumor and multilevel fusion had been previously  demonstrated. On the anterior images, there is some vague uptake in the  sternum which could be due to shine through from the spine but the CT  shows sclerosis in the sternum and the lower manubrium. There is also  some radiotracer activity along the posterior right ilium, representing  bone graft harvest site. No new area of abnormal uptake is present.  IMPRESSION:  Stable bone scan. Multiple areas of abnormal activity are no  different than on previous imaging. There is no new abnormality.    Assessment/Plan      1. Previous Stage Ib ([id number]) right breast cancer:  · Diagnosed May 2010 with excisional biopsy for invasive ductal carcinoma, 1.3 cm, grade 2, ER 90%, GA 80%, HER-2/peter negative (1+ IHC).    · Subsequent right mastectomy in July 2010 with no residual breast malignancy, 1/5 sentinel lymph node with micrometastasis (0.25 mm).    · Treated in the [facility] system with adjuvant AC  ×4 cycles in 2010 (no taxanes administered due to underlying Charcot-Saloni-Tooth with peripheral neuropathy).    · Adjuvant Femara (postmenopausal) initiated October 2010 with plan to treat ×10 years.    · Genetic testing reportedly negative.    · Developed osteopenia treated with Prolia beginning 2/27/13. Subsequently discontinued due to identification of metastatic disease.  2. Recurrent/metastatic disease identified 10/8/17:  · Disease involving thoracic spine with cord compression at T6, lumbosacral involvement, sternal and right sternoclavicular involvement.    · Femara discontinued in 10/2017.    · Radiation administered (in the [facility] system) to the thoracic spine beginning 10/19/17 treating T3-T9 to a dose of 24 gray in 6 fractions.  · Evaluation with MRI 12/8/17 showing persistent T6 cord compression with persistent neurologic compromise requiring surgical treatment 12/11/17 with T6 laminectomy/corpectomy and T3-T9 fusion.  Pathology with metastatic carcinoma of breast origin, ER negative, MI negative, HER-2/peter negative (1+ IHC).  · Additional staging evaluation 12/8/17 with no evidence of visceral metastatic disease, bone scan showing involvement of thoracic spine, sternum, left humerus, mid frontal bone.  No plane film correlate of left humerus lesion.  MRI lumbar spine with small intradural L3 metastasis.  CA 15-3 12/6/17- 17.  · Palliative radiation therapy to L3 dural metastasis and left humerus initiated 1/15/18 (10 fractions), completed 1/26/18.  · Hypercalcemia of malignancy with calcium in the 10-11 range.  · Initiation of monthly Xgeva 1/23/18.  · Baseline CT scan 1/30/18 with no evidence of visceral involvement.  Cluster of nodular opacities in the right lower lobe suspected to be infectious or related to bronchiolitis. Bone scan 1/30/18 showed postsurgical change in the thoracic spine, stable uptake in the frontal bone, no new areas of disease.  · Initiation of palliative oral single agent  Xeloda 2/7/18 2000 mg a.m., 1500 mg p.m. for 14/21 days.   · Following 3 cycles xeloda, bone scan 4/4/18 showed no change from the prior study.  CT scan 4/4/18 showed a small pericardial effusion of unclear significance as well as a subcutaneous nodule in the right lateral chest wall.  Subsequent evaluation with echocardiogram 4/17/18 showed no evidence of pericardial effusion.  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  We discussed the possibility of surgical excision of the right subcutaneous chest wall lesion for more definitive diagnosis.  Reviewed previous CT images dating back to 12/8/17 and the lesion was present even at that time measuring around 1.7 cm although not commented on in the radiology report.  As this appears to be an indolent low-grade process unrelated to her breast cancer, recommendee foregoing surgical excision at this time and monitoring this area on future scans.  The patient agreed.    · Following 6 cycles of Xeloda, CT 6/6/18  showed stable findings, no evidence of progressive disease.  There was a comment regarding subcutaneous abnormality in the anterior abdominal wall and this was related to Lovenox injection sites.  Bone scan 6/6/18 showed no interval change.   · The patient was seen on 8/20/2018 with  CT scan and bone scan to review from 8/13/18 following 9 cycles of Xeloda.  His scans show stable findings with no evidence of significant visceral metastases.  Her bone lesions appear stable on bone scan.  The spindle cell neoplasm in her right chest wall has actually decreased in size from 2 cm down to 1.6 cm.  She initiated her 10th cycle of Xeloda on 8/15/18.    · She returns today for a triage visit for recurrent diarrhea, progressive eye tearing, weakness, diminished appetite, and concern for recurrent urinary tract infection. She has  been referred back to Dr. Ocasio, of gastroenterolgoy for and will undergo colonoscopy.She was also recently evaluated with cystoscopy for recurrent UTIs, which was unremarkable, as outlined above. All other symptoms are likely xeloda-related toxicities. Patient has been unable to take the last 2 days of Xeloda and only has 2 days remaining in this current cycle (#10).  This will all be further detailed below. She will return in 2 weeks for MD visit in conjunction with cycle #11 of Xeloda and next dose of Xgeva.     3. Right hip/gluteal hematoma:  · The patient had developed considerable pain in the right hip and underwent MRI 3/22/18 visit showed a 5.7 x 4.3 x 11 cm hematoma in the right gluteal region.  · Lovenox was held 3/23 through 4/6/18.  Resolution of pain, Lovenox was resumed. Hematoma likely occurred due to minimal trauma from transfer out of her wheelchair.   4. Right pulmonary embolism:  · Diagnosed on CT angiogram 10/21/17 involving small right lower lobe pulmonary artery.  Lower extremity Dopplers negative.  · Bilateral lower extremity Dopplers negative again 12/5/17.  · Continuing on Lovenox 1 mg/kg (80 mg) subcutaneous injection every 12 hours.  Lovenox was held for 2 weeks due to right hip hematoma seen on MRI, treatment resumed on 4/6/18 with no further obvious bleeding issues.She continues on Lovenox with no evidence of bleeding.   5. Cancer related pain:  · Previously receiving Duragesic 50 µg patch every 72 hours along with Dilaudid 4 mg as needed for breakthrough pain  · The patient's pain improved over time and she was able to discontinue both Duragesic and Dilaudid in the interval.  · Patient does take occasional Flexeril at bedtime due to back spasm/pain when she has been more active.  · Right hip pain as noted above due to right gluteal hematoma, previously prescribed Dilaudid 4 mg every 4 hours as needed #60 with no refill.  Pain from hematoma resolved.  · The patient does have some  occasional aggravation of her chronic back pain with significant rehabilitation activity and requires an occasional dose of Dilaudid.  · Pain was not a significant issue today.   6. Chemotherapy-induced diarrhea:  · This has unfortunately progressed despite use of Imodium, over the last 2 days. She was given a RX for Lomotil, but has yet to try this. I have encouraged her to take this up to 4 times a day. I have also asked her to increase her water intake to at least 8-8 ounce glasses of water daily. I am hopeful that diarrhea will improve while patient is in her off week of Xeloda. I have asked her not to take the final 2 days of Xeloda remaining in this cycle. I have reviewed this with Dr. Hancock and patient will likely require a dose reduction of Xeloda with subsequent cycles.     7. Traumatic left tibia/fibular fracture:  · Status post ORIF 12/6/17  · Specimen was sent for pathologic review, negative for evidence of malignancy  8. Hypercalcemia:  · Suspect hypercalcemia of malignancy, calcium in  10-11 range previously.  · Calcium normalized following initiation of monthly Xgeva on 1/23/18.  · Calcium is actually mildly low today at 8.2. Patient is asymptomatic   9. Chemotherapy-induced mucositis:  · Patient had a minimal degree of mucositis with cycle 2.  The patient has magic mouthwash to use as needed.  No subsequent mucositis.  10. Recurrent UTI, bladder wall thickening on CT:  · Patient had an enterococcal UTI on 3/2/18 sensitive to nitrofurantoin and received treatment, unclear how long.  · Recurrent UTI 3/20/18 with urine culture growing Klebsiella, initially treated with nitrofurantoin, transitioned to Levaquin.  · CT 4/4/18 with diffuse bladder wall thickening with increased nodular thickening at the left base.  Referral to urogynecology Dr. May Johnson.  She was placed on a prophylactic dose of trimethoprim 100 mg daily, bladder wall thickening felt to be related to recent recurrent urinary tract  infections.    · She recently underwent additional cystoscopy on 8/22/2018 with no evidence of recurrent UTI. She was asked to continue on daily Trimethoprim 100 mg.   · A urinalysis was obtained today for complaints of dysuria and frequency. Results reveal +nitrities, +Leuk Esterase, 3+blood, 3+protein. We will treat patient with Levaquin 500 mg daily x 5 days. A culture has been requested.   11.   Mobility:  · The patient continues with aggressive outpatient physical therapy at [facility]. She continues to improve considerably and is now able to walk with assistance.  · With her recent symptoms, rehabilitation activities have been somewhat diminished  12.  Depression:  · The patient weaned herself off of Cymbalta recently and reports that her symptoms are stable.  13. Left heel pressure wound:  · The patient previously had a wound in this area.  She now has a erythematous blister lesion in the same area. She has an appointment with wound care on September 7, 2018. Wound is stable.   14. 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  She continues to use Eucerin cream multiple times throughout the day. This is unchanged and stable today.   15. Elevated liver function studies:  · First identified on 8/20/18  with ALT 98, AST 70, previously normal. Improved today with AST 36 and ALT 45.   · It was unclear whether this could be related to Xeloda versus recent viral infection.  A viral hepatitis panel was obtained and was negative.    16. Hypomagnesemia: Secondary to diarrhea. Magnesium 1.5 today. We will administer 2 G of IV magnesium in conjunction with IV hydration today.     Plan:  1. Patient instructed NOT to take final 2 days remaining of cycle #10 Xeloda.  (2000 mg a.m., 1500 mg p.m. for 14/21 days), initiated 8/15/18.  2. Continue Lovenox 80 mg every 12 hours.  3. 1 Liter IV hydration with 2 G IV magnesium to be administered today in the  office  4. Levaquin 500 mg PO daily x 7 days e-scribed to patient's pharmacy for TX of UTI. Urine culture pending at time of this dictation  5. Instructed patient to take Lomotil in alternation with Imodium for relief of diarrhea  6. Continue outpatient physical therapy at [facility]  7. Referral to wound care clinic regarding left posterior heel pressure wound. Patient scheduled for 9/7/2018  8. Referred to prior GI physician Dr. Ocasio to consider colonoscopy in regards to diarrhea and right colon thickening seen on CT. Patient has the paperwork and will return this to Dr. Scott's office this week.   9. 9/10/2018- M.D. visit CBC, CMP, magnesium, phosphorus.  The patient will be due for Xgeva.  She will not begin cycle 11 Xeloda until she is seen that day. We will anticipate dose reduction of Xeloda given her significant toxicities. I have asked her to call our office with progression of symptoms over the next week as she may require additional IV hydration    -All of the above was reviewed with Dr. Hancock and he is in agreement

## 2018-08-27 NOTE — TELEPHONE ENCOUNTER
Patient states she has not taken her xeloda x 2 days d/t nausea and diarrhea.  Not eating/drinking well.  Also c/o a poss UTI.  Says she had a scope last week and that MD said she didn't have a UTI but she feels it.  Also c/o a wound on her heel and can't get into wound clinic until the 7th.  Reviewed with Glory ARELLANO today, ok to add for her and poss IVF's.  Attempted to call EP chemo RN's but no answer.  Message to the appt desk to schedule it.  Labs cbc, cmp, mag and UA.     ----- Message from Martha Molina sent at 8/27/2018 10:21 AM EDT -----  [phone]  Ref;can't take chemo pill , vomiting.  Not eating or drinking well.

## 2018-09-06 ENCOUNTER — APPOINTMENT (OUTPATIENT)
Dept: LAB | Facility: HOSPITAL | Age: 58
End: 2018-09-06

## 2018-09-06 ENCOUNTER — OFFICE (AMBULATORY)
Dept: URBAN - METROPOLITAN AREA CLINIC 75 | Facility: CLINIC | Age: 58
End: 2018-09-06

## 2018-09-06 ENCOUNTER — APPOINTMENT (OUTPATIENT)
Dept: ONCOLOGY | Facility: HOSPITAL | Age: 58
End: 2018-09-06

## 2018-09-06 VITALS
HEART RATE: 99 BPM | SYSTOLIC BLOOD PRESSURE: 126 MMHG | WEIGHT: 200 LBS | HEIGHT: 61 IN | DIASTOLIC BLOOD PRESSURE: 81 MMHG

## 2018-09-06 DIAGNOSIS — R74.8 ABNORMAL LEVELS OF OTHER SERUM ENZYMES: ICD-10-CM

## 2018-09-06 DIAGNOSIS — R19.7 DIARRHEA, UNSPECIFIED: ICD-10-CM

## 2018-09-06 DIAGNOSIS — K52.9 NONINFECTIVE GASTROENTERITIS AND COLITIS, UNSPECIFIED: ICD-10-CM

## 2018-09-06 DIAGNOSIS — K21.9 GASTRO-ESOPHAGEAL REFLUX DISEASE WITHOUT ESOPHAGITIS: ICD-10-CM

## 2018-09-06 PROCEDURE — 99214 OFFICE O/P EST MOD 30 MIN: CPT

## 2018-09-07 ENCOUNTER — OFFICE VISIT (OUTPATIENT)
Dept: WOUND CARE | Facility: HOSPITAL | Age: 58
End: 2018-09-07
Attending: SURGERY

## 2018-09-07 ENCOUNTER — OFFICE VISIT (OUTPATIENT)
Dept: ONCOLOGY | Facility: CLINIC | Age: 58
End: 2018-09-07

## 2018-09-07 ENCOUNTER — LAB (OUTPATIENT)
Dept: OTHER | Facility: HOSPITAL | Age: 58
End: 2018-09-07

## 2018-09-07 DIAGNOSIS — Z17.1 MALIGNANT NEOPLASM OF OVERLAPPING SITES OF RIGHT BREAST IN FEMALE, ESTROGEN RECEPTOR NEGATIVE (HCC): ICD-10-CM

## 2018-09-07 DIAGNOSIS — C50.811 MALIGNANT NEOPLASM OF OVERLAPPING SITES OF RIGHT BREAST IN FEMALE, ESTROGEN RECEPTOR NEGATIVE (HCC): ICD-10-CM

## 2018-09-07 DIAGNOSIS — Z17.1 MALIGNANT NEOPLASM OF OVERLAPPING SITES OF RIGHT BREAST IN FEMALE, ESTROGEN RECEPTOR NEGATIVE (HCC): Primary | ICD-10-CM

## 2018-09-07 DIAGNOSIS — C50.811 MALIGNANT NEOPLASM OF OVERLAPPING SITES OF RIGHT BREAST IN FEMALE, ESTROGEN RECEPTOR NEGATIVE (HCC): Primary | ICD-10-CM

## 2018-09-07 LAB
ALBUMIN SERPL-MCNC: 3.9 G/DL (ref 3.5–5.2)
ALBUMIN/GLOB SERPL: 1.6 G/DL
ALP SERPL-CCNC: 71 U/L (ref 39–117)
ALT SERPL W P-5'-P-CCNC: 46 U/L (ref 1–33)
ANION GAP SERPL CALCULATED.3IONS-SCNC: 10.2 MMOL/L
AST SERPL-CCNC: 27 U/L (ref 1–32)
BASOPHILS # BLD AUTO: 0.05 10*3/MM3 (ref 0–0.2)
BASOPHILS NFR BLD AUTO: 1.1 % (ref 0–1.5)
BILIRUB SERPL-MCNC: 0.5 MG/DL (ref 0.1–1.2)
BUN BLD-MCNC: 13 MG/DL (ref 6–20)
BUN/CREAT SERPL: 16.3 (ref 7–25)
CALCIUM SPEC-SCNC: 9.8 MG/DL (ref 8.6–10.5)
CHLORIDE SERPL-SCNC: 102 MMOL/L (ref 98–107)
CO2 SERPL-SCNC: 28.8 MMOL/L (ref 22–29)
CREAT BLD-MCNC: 0.8 MG/DL (ref 0.57–1)
DEPRECATED RDW RBC AUTO: 65.2 FL (ref 37–54)
EOSINOPHIL # BLD AUTO: 0.67 10*3/MM3 (ref 0–0.7)
EOSINOPHIL NFR BLD AUTO: 14.1 % (ref 0.3–6.2)
ERYTHROCYTE [DISTWIDTH] IN BLOOD BY AUTOMATED COUNT: 17.2 % (ref 11.7–13)
GFR SERPL CREATININE-BSD FRML MDRD: 74 ML/MIN/1.73
GLOBULIN UR ELPH-MCNC: 2.5 GM/DL
GLUCOSE BLD-MCNC: 109 MG/DL (ref 65–99)
HCT VFR BLD AUTO: 36.6 % (ref 35.6–45.5)
HGB BLD-MCNC: 12.1 G/DL (ref 11.9–15.5)
IMM GRANULOCYTES # BLD: 0.01 10*3/MM3 (ref 0–0.03)
IMM GRANULOCYTES NFR BLD: 0.2 % (ref 0–0.5)
LYMPHOCYTES # BLD AUTO: 1.01 10*3/MM3 (ref 0.9–4.8)
LYMPHOCYTES NFR BLD AUTO: 21.3 % (ref 19.6–45.3)
MAGNESIUM SERPL-MCNC: 1.8 MG/DL (ref 1.6–2.6)
MCH RBC QN AUTO: 33.6 PG (ref 26.9–32)
MCHC RBC AUTO-ENTMCNC: 33.1 G/DL (ref 32.4–36.3)
MCV RBC AUTO: 101.7 FL (ref 80.5–98.2)
MONOCYTES # BLD AUTO: 0.65 10*3/MM3 (ref 0.2–1.2)
MONOCYTES NFR BLD AUTO: 13.7 % (ref 5–12)
NEUTROPHILS # BLD AUTO: 2.35 10*3/MM3 (ref 1.9–8.1)
NEUTROPHILS NFR BLD AUTO: 49.6 % (ref 42.7–76)
NRBC BLD MANUAL-RTO: 0 /100 WBC (ref 0–0)
PHOSPHATE SERPL-MCNC: 4.5 MG/DL (ref 2.5–4.5)
PLATELET # BLD AUTO: 199 10*3/MM3 (ref 140–500)
PMV BLD AUTO: 10.2 FL (ref 6–12)
POTASSIUM BLD-SCNC: 4.3 MMOL/L (ref 3.5–5.2)
PROT SERPL-MCNC: 6.4 G/DL (ref 6–8.5)
RBC # BLD AUTO: 3.6 10*6/MM3 (ref 3.9–5.2)
SODIUM BLD-SCNC: 141 MMOL/L (ref 136–145)
WBC NRBC COR # BLD: 4.74 10*3/MM3 (ref 4.5–10.7)

## 2018-09-07 PROCEDURE — 87088 URINE BACTERIA CULTURE: CPT | Performed by: NURSE PRACTITIONER

## 2018-09-07 PROCEDURE — 87086 URINE CULTURE/COLONY COUNT: CPT | Performed by: NURSE PRACTITIONER

## 2018-09-07 PROCEDURE — G0463 HOSPITAL OUTPT CLINIC VISIT: HCPCS

## 2018-09-07 PROCEDURE — 87077 CULTURE AEROBIC IDENTIFY: CPT | Performed by: NURSE PRACTITIONER

## 2018-09-07 PROCEDURE — 36415 COLL VENOUS BLD VENIPUNCTURE: CPT

## 2018-09-07 PROCEDURE — 80053 COMPREHEN METABOLIC PANEL: CPT | Performed by: INTERNAL MEDICINE

## 2018-09-07 PROCEDURE — 87186 SC STD MICRODIL/AGAR DIL: CPT | Performed by: NURSE PRACTITIONER

## 2018-09-07 PROCEDURE — 85025 COMPLETE CBC W/AUTO DIFF WBC: CPT | Performed by: INTERNAL MEDICINE

## 2018-09-07 PROCEDURE — 99212-NC PR NO CHARGE CBC OFFICE OUTPATIENT VISIT 10 MINUTES: Performed by: NURSE PRACTITIONER

## 2018-09-07 PROCEDURE — 83735 ASSAY OF MAGNESIUM: CPT | Performed by: INTERNAL MEDICINE

## 2018-09-07 PROCEDURE — 84100 ASSAY OF PHOSPHORUS: CPT | Performed by: INTERNAL MEDICINE

## 2018-09-07 NOTE — PROGRESS NOTES
Patient is here for lab and are reviewed today.  She is currently in her off week of Xeloda (2000 mg in the morning, and 1500 mg in the evening for 14 out of 21 days).  She previously struggled with significant toxicities including diarrhea, eye tearing, weakness and decreased appetite.  She was actually unable to take her last 2 days of Xeloda and her previous cycle.    Thankfully, today all symptoms have resolved and she is feeling much better.  Her labs are all within normal limits today.  She will see Dr. Hancock and follow-up on Monday, September 10, 2018.  At this time, she will begin her next cycle of Xeloda at likely reduced doses.  I've asked her to call over the weekend with any issues.  She has verbalized understanding.    Lab Results   Component Value Date    WBC 4.74 09/07/2018    HGB 12.1 09/07/2018    HCT 36.6 09/07/2018    .7 (H) 09/07/2018     09/07/2018     [place]ia GONZALEZ

## 2018-09-10 ENCOUNTER — OFFICE VISIT (OUTPATIENT)
Dept: ONCOLOGY | Facility: CLINIC | Age: 58
End: 2018-09-10

## 2018-09-10 ENCOUNTER — DOCUMENTATION (OUTPATIENT)
Dept: ONCOLOGY | Facility: CLINIC | Age: 58
End: 2018-09-10

## 2018-09-10 ENCOUNTER — INFUSION (OUTPATIENT)
Dept: ONCOLOGY | Facility: HOSPITAL | Age: 58
End: 2018-09-10

## 2018-09-10 ENCOUNTER — LAB (OUTPATIENT)
Dept: OTHER | Facility: HOSPITAL | Age: 58
End: 2018-09-10

## 2018-09-10 VITALS
OXYGEN SATURATION: 96 % | RESPIRATION RATE: 16 BRPM | HEART RATE: 95 BPM | HEIGHT: 61 IN | DIASTOLIC BLOOD PRESSURE: 80 MMHG | TEMPERATURE: 98.4 F | SYSTOLIC BLOOD PRESSURE: 132 MMHG

## 2018-09-10 DIAGNOSIS — Z17.1 MALIGNANT NEOPLASM OF OVERLAPPING SITES OF RIGHT BREAST IN FEMALE, ESTROGEN RECEPTOR NEGATIVE (HCC): ICD-10-CM

## 2018-09-10 DIAGNOSIS — C50.811 MALIGNANT NEOPLASM OF OVERLAPPING SITES OF RIGHT BREAST IN FEMALE, ESTROGEN RECEPTOR NEGATIVE (HCC): ICD-10-CM

## 2018-09-10 DIAGNOSIS — C50.811 MALIGNANT NEOPLASM OF OVERLAPPING SITES OF RIGHT BREAST IN FEMALE, ESTROGEN RECEPTOR NEGATIVE (HCC): Primary | ICD-10-CM

## 2018-09-10 DIAGNOSIS — Z17.1 MALIGNANT NEOPLASM OF OVERLAPPING SITES OF RIGHT BREAST IN FEMALE, ESTROGEN RECEPTOR NEGATIVE (HCC): Primary | ICD-10-CM

## 2018-09-10 LAB
ALBUMIN SERPL-MCNC: 4 G/DL (ref 3.5–5.2)
ALBUMIN/GLOB SERPL: 1.6 G/DL
ALP SERPL-CCNC: 78 U/L (ref 39–117)
ALT SERPL W P-5'-P-CCNC: 39 U/L (ref 1–33)
ANION GAP SERPL CALCULATED.3IONS-SCNC: 12.7 MMOL/L
AST SERPL-CCNC: 30 U/L (ref 1–32)
BACTERIA SPEC AEROBE CULT: ABNORMAL
BASOPHILS # BLD AUTO: 0.07 10*3/MM3 (ref 0–0.2)
BASOPHILS NFR BLD AUTO: 1.4 % (ref 0–1.5)
BILIRUB SERPL-MCNC: 0.4 MG/DL (ref 0.1–1.2)
BUN BLD-MCNC: 13 MG/DL (ref 6–20)
BUN/CREAT SERPL: 14.4 (ref 7–25)
CALCIUM SPEC-SCNC: 10.2 MG/DL (ref 8.6–10.5)
CHLORIDE SERPL-SCNC: 100 MMOL/L (ref 98–107)
CO2 SERPL-SCNC: 26.3 MMOL/L (ref 22–29)
CREAT BLD-MCNC: 0.9 MG/DL (ref 0.57–1)
DEPRECATED RDW RBC AUTO: 60.5 FL (ref 37–54)
EOSINOPHIL # BLD AUTO: 0.85 10*3/MM3 (ref 0–0.7)
EOSINOPHIL NFR BLD AUTO: 17.6 % (ref 0.3–6.2)
ERYTHROCYTE [DISTWIDTH] IN BLOOD BY AUTOMATED COUNT: 16.2 % (ref 11.7–13)
GFR SERPL CREATININE-BSD FRML MDRD: 65 ML/MIN/1.73
GLOBULIN UR ELPH-MCNC: 2.5 GM/DL
GLUCOSE BLD-MCNC: 157 MG/DL (ref 65–99)
HCT VFR BLD AUTO: 38.5 % (ref 35.6–45.5)
HGB BLD-MCNC: 12.9 G/DL (ref 11.9–15.5)
IMM GRANULOCYTES # BLD: 0.01 10*3/MM3 (ref 0–0.03)
IMM GRANULOCYTES NFR BLD: 0.2 % (ref 0–0.5)
LYMPHOCYTES # BLD AUTO: 1.26 10*3/MM3 (ref 0.9–4.8)
LYMPHOCYTES NFR BLD AUTO: 26 % (ref 19.6–45.3)
MCH RBC QN AUTO: 33.5 PG (ref 26.9–32)
MCHC RBC AUTO-ENTMCNC: 33.5 G/DL (ref 32.4–36.3)
MCV RBC AUTO: 100 FL (ref 80.5–98.2)
MONOCYTES # BLD AUTO: 0.6 10*3/MM3 (ref 0.2–1.2)
MONOCYTES NFR BLD AUTO: 12.4 % (ref 5–12)
NEUTROPHILS # BLD AUTO: 2.05 10*3/MM3 (ref 1.9–8.1)
NEUTROPHILS NFR BLD AUTO: 42.4 % (ref 42.7–76)
NRBC BLD MANUAL-RTO: 0 /100 WBC (ref 0–0)
PLATELET # BLD AUTO: 226 10*3/MM3 (ref 140–500)
PMV BLD AUTO: 11.7 FL (ref 6–12)
POTASSIUM BLD-SCNC: 4.6 MMOL/L (ref 3.5–5.2)
PROT SERPL-MCNC: 6.5 G/DL (ref 6–8.5)
RBC # BLD AUTO: 3.85 10*6/MM3 (ref 3.9–5.2)
SODIUM BLD-SCNC: 139 MMOL/L (ref 136–145)
WBC NRBC COR # BLD: 4.84 10*3/MM3 (ref 4.5–10.7)

## 2018-09-10 PROCEDURE — 25010000002 DENOSUMAB 120 MG/1.7ML SOLUTION: Performed by: INTERNAL MEDICINE

## 2018-09-10 PROCEDURE — 99215 OFFICE O/P EST HI 40 MIN: CPT | Performed by: INTERNAL MEDICINE

## 2018-09-10 PROCEDURE — 80053 COMPREHEN METABOLIC PANEL: CPT | Performed by: INTERNAL MEDICINE

## 2018-09-10 PROCEDURE — 36415 COLL VENOUS BLD VENIPUNCTURE: CPT

## 2018-09-10 PROCEDURE — 96372 THER/PROPH/DIAG INJ SC/IM: CPT | Performed by: INTERNAL MEDICINE

## 2018-09-10 PROCEDURE — 85025 COMPLETE CBC W/AUTO DIFF WBC: CPT | Performed by: INTERNAL MEDICINE

## 2018-09-10 RX ORDER — CAPECITABINE 500 MG/1
TABLET, FILM COATED ORAL
Qty: 84 TABLET | Refills: 3 | Status: SHIPPED | OUTPATIENT
Start: 2018-09-10 | End: 2019-04-26 | Stop reason: SDUPTHER

## 2018-09-10 RX ORDER — NITROFURANTOIN MACROCRYSTALS 100 MG/1
100 CAPSULE ORAL 2 TIMES DAILY
Qty: 20 CAPSULE | Refills: 0 | Status: SHIPPED | OUTPATIENT
Start: 2018-09-10 | End: 2019-05-06

## 2018-09-10 RX ADMIN — DENOSUMAB 120 MG: 120 INJECTION SUBCUTANEOUS at 15:53

## 2018-09-10 NOTE — PROGRESS NOTES
Subjective .     REASONS FOR FOLLOWUP:    1. Stage Ib ([id number]) right breast cancer: Diagnosed May 2010 with excisional biopsy for invasive ductal carcinoma, 1.3 cm, grade 2, ER 90%, ME 80%, HER-2/peter negative (1+ IHC).  Subsequent right mastectomy in July 2010 with no residual breast malignancy, 1/5 sentinel lymph node with micrometastasis (0.25 mm).  Treated in the [facility] system with adjuvant AC ×4 cycles in 2010 (no taxanes administered due to underlying Charcot-Saloni-Tooth with peripheral neuropathy).  Adjuvant Femara (postmenopausal) initiated October 2010 with plan to treat ×10 years.  Genetic testing reportedly negative.  Developed osteopenia treated with Prolia beginning 2/27/13.  2. Recurrent/metastatic disease identified 10/8/17 involving thoracic spine with cord compression at T6, lumbosacral involvement, sternal and right sternoclavicular involvement.  Femara discontinued.  Radiation administered (in the [facility] system) to the thoracic spine beginning 10/19/17 treating T3-T9 to a dose of 24 gray in 6 fractions.  3. Evaluation with MRI 12/8/17 showing persistent T6 cord compression with persistent neurologic compromise requiring surgical treatment 12/11/17 with T6 laminectomy/corpectomy and T3-T9 fusion.  Pathology with metastatic carcinoma of breast origin, ER negative, ME negative, HER-2/peter negative (1+ IHC).  4. Right pulmonary embolism 10/21/17 continuing on Lovenox 1 mg/kg (100 mg) every 12 hours.  No evidence of DVT.  Lovenox held due to right gluteus hematoma 3/23/18 through 4/6/18.  5. Cancer related pain previously on Duragesic 50 µg patch every 72 hours and Dilaudid 4 mg as needed for breakthrough pain.  Narcotics subsequently discontinued with improvement in pain in January 2018.  6. Radiation therapy to L3 dural metastasis and left humerus initiated 1/15/18 (10 fractions), completed 1/26/18  7. Monthly Xgeva initiated 1/23/18  8. Mild hypercalcemia of malignancy  9. Initiation of  palliative oral single agent Xeloda 2/7/18 (2000 mg a.m., 1500 mg p.m. for 14/21 days).  10. 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  In reviewing prior CT scans, has been present for some time.  Monitor for now, if it enlarges consider surgical excision.  11. Cumulative side effects from Xeloda with fatigue, anorexia, diarrhea, increased tearing.  Alteration in dose/schedule with cycle 11 to 1500 mg twice a day for 7 days on followed by 7 days off.    HISTORY OF PRESENT ILLNESS:  The patient is a 57 y.o. year old female who is here for follow-up with the above-mentioned history.    History of Present Illness the patient returns today in follow-up due to begin cycle 11 Xeloda and also continuing on monthly Xgeva due today.  In the interval, the end of cycle 10, treatment was cut short by 2 days due to development of recurrent symptoms of anorexia, generalized weakness, diarrhea, increased tearing.  Obviously, with recurrence of the symptoms they're felt to be treatment related.  Symptoms resolved promptly after approximately 5 days and she is now back to her baseline with no further diarrhea.  She does have 1 small area of mucositis.  She has been continuing with her rehabilitation at [facility] and is doing quite well, able to ambulate short distances with walker.  She does remain in wheelchair today.  She did see GI in follow-up and endoscopy was not recommended.  It was recommended for (probiotic and to use Lomotil as needed.  She was treated for urinary tract infection with Levaquin.  Her urinary symptoms are currently resolved although she did have a follow-up urine culture performed on 9/7/18.  Hand-foot symptoms remain mild with faint peeling of skin on the hands    Past Medical History:   Diagnosis Date   • Acute pulmonary embolism,  cancer-related 10/2017   • Allergic rhinitis    • Arthritis     Right knee; left shoulder   • Cancer (CMS/HCC) 2010    Right breast   • Cancer (CMS/[facility]) 10/2017    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   • Charcot-Saloni-Tooth disease    • CPAP (continuous positive airway pressure) dependence    • GERD (gastroesophageal reflux disease)    • H/O Colon polyps    • H/O Uterine fibroid    • Heart murmur    • Hyperlipidemia    • Hypertension    • PONV (postoperative nausea and vomiting)      Past Surgical History:   Procedure Laterality Date   • BLADDER SURGERY      Bladder lift   • BREAST RECONSTRUCTION, BREAST TISSUE EXPANDER INSERTION Right    • BREAST SURGERY Right 2010    Mastectomy   •  SECTION  ,    • CHOLECYSTECTOMY     • COLONOSCOPY     • HERNIA REPAIR  2015    [facility]   • HYSTERECTOMY      Partial   • KNEE SURGERY Right    • LEG SURGERY Left     Broken   • MASTECTOMY Right    • KS TREAT TIBIAL SHAFT FX, INTRAMED IMPLANT Left 2017    Procedure: TIBIA INTRAMEDULLARY NAIL/DON INSERTION;  Surgeon: Romero Shanks MD;  Location: [facility];  Service: Orthopedics   • THORACIC DECOMPRESSION POSTERIOR FUSION WITH INSTRUMENTATION N/A 2017    Procedure: T6 costotransversectomy and decompression with T3 to  T9 posterior spinal fusion with instrumentation with Jennifer Gonzalez and Nael Wilkes;  Surgeon: Quan Nayak MD;  Location: [facility];  Service:        ONCOLOGIC HISTORY:  (History from previous dates can be found in the separate document.)    Current Outpatient Prescriptions on File Prior to Visit   Medication Sig Dispense Refill   • acetaminophen (TYLENOL) 500 MG tablet Take 500 mg by mouth Every 6 (Six) Hours As Needed for Mild Pain .     • calcium carbonate (OS-CARY) 600 MG tablet Take 600 mg by mouth 2 (Two) Times a Day With Meals.     • capecitabine (XELODA) 500 MG chemo tablet Take 4 tabs (2000 mg) in the AM then take 3 tabs (1500 mg) in the PM for 14  days on then 7 days off. 98 tablet 3   • cholecalciferol (VITAMIN D3) 1000 units tablet Take 1,000 Units by mouth Daily.     • cyclobenzaprine (FLEXERIL) 10 MG tablet Take 1 tablet by mouth 3 (Three) Times a Day As Needed for Muscle Spasms. 30 tablet 3   • diphenoxylate-atropine (LOMOTIL) 2.5-0.025 MG per tablet Take 1 tablet by mouth 4 (Four) Times a Day As Needed for Diarrhea. 60 tablet 2   • enoxaparin (LOVENOX) 80 MG/0.8ML solution syringe INJECT 0.8 ML UNDER THE SKIN Q 12 H  3   • FLONASE ALLERGY RELIEF 50 MCG/ACT nasal spray 2 sprays into each nostril daily.  11   • gabapentin (NEURONTIN) 300 MG capsule Take 1 capsule by mouth 3 (Three) Times a Day. 90 capsule 2   • HYDROmorphone (DILAUDID) 4 MG tablet Take 1 tablet by mouth Every 4 (Four) Hours As Needed for Moderate Pain . 60 tablet 0   • mesalamine (LIALDA) 1.2 g EC tablet Take 1 tablet by mouth 2 (Two) Times a Day. 180 tablet 1   • omeprazole (PriLOSEC) 40 MG capsule TAKE 1 CAPSULE DAILY 90 capsule 2   • ondansetron ODT (ZOFRAN-ODT) 8 MG disintegrating tablet Take 1 tablet by mouth Every 8 (Eight) Hours As Needed for Nausea or Vomiting. 30 tablet 1   • phenazopyridine (PYRIDIUM) 200 MG tablet Take 200 mg by mouth 3 (Three) Times a Day As Needed for bladder spasms.     • prochlorperazine (COMPAZINE) 10 MG tablet Take 1 tablet by mouth Every 6 (Six) Hours As Needed for Nausea or Vomiting. 30 tablet 0   • sennosides-docusate sodium (SENOKOT-S) 8.6-50 MG tablet Take 2 tablets by mouth 2 (Two) Times a Day. 90 tablet 2   • traMADol (ULTRAM) 50 MG tablet Take 1 tablet by mouth Every 8 (Eight) Hours As Needed for Moderate Pain . 90 tablet 2   • trimethoprim (TRIMPEX) 100 MG tablet Take 100 mg by mouth Daily.  2   • Tuberculin PPD (APLISOL ID) Inject  into the skin.     • Unable to find SWISH AND SPIT 5 ML PO Q 2 H PRN  0     No current facility-administered medications on file prior to visit.        ALLERGIES:     Allergies   Allergen Reactions   • Hydrocodone  Nausea Only   • Morphine And Related Nausea And Vomiting     Social History     Social History   • Marital status:      Spouse name: Murray   • Number of children: 3   • Years of education: College     Occupational History   •  [employer]   •  Retired     Social History Main Topics   • Smoking status: Never Smoker   • Smokeless tobacco: Never Used   • Alcohol use Yes      Comment: social   • Drug use: No   • Sexual activity: Defer     Other Topics Concern   • Not on file     Social History Narrative   • No narrative on file     Family History   Problem Relation Age of Onset   • Heart disease Mother    • Hyperlipidemia Mother    • Hypertension Mother    • Diabetes Father    • Charcot-Saloni-Tooth disease Father    • Heart disease Father    • Hypertension Father    • Heart failure Father    • Diabetes Sister    • Heart disease Sister    • Hypertension Sister    • Heart disease Maternal Aunt    • Scoliosis Maternal Aunt    • Heart disease Maternal Uncle    • Diabetes Maternal Grandmother    • Heart disease Maternal Grandmother    • Hypertension Maternal Grandmother    • Uterine cancer Maternal Grandmother    • Heart disease Maternal Grandfather      Review of Systems   Constitutional: Positive for fatigue. Negative for activity change, appetite change, fever and unexpected weight change.   HENT: Negative for congestion, mouth sores, nosebleeds, sore throat and voice change.    Eyes: Positive for discharge.   Respiratory: Negative for cough, shortness of breath and wheezing.    Cardiovascular: Negative for chest pain, palpitations and leg swelling.   Gastrointestinal: Positive for diarrhea. Negative for abdominal distention, abdominal pain, blood in stool, constipation, nausea and vomiting.   Endocrine: Negative for cold intolerance and heat intolerance.   Genitourinary: Negative for difficulty urinating, dysuria, frequency and hematuria.   Musculoskeletal: Positive for back pain. Negative for arthralgias, joint  swelling and myalgias.   Skin: Positive for rash and wound.   Neurological: Positive for weakness. Negative for dizziness, syncope, light-headedness, numbness and headaches.   Hematological: Negative for adenopathy. Does not bruise/bleed easily.   Psychiatric/Behavioral: Negative for confusion and sleep disturbance. The patient is not nervous/anxious.          Objective      Vitals:    09/10/18 1505   BP: 132/80   Pulse: 95   Resp: 16   Temp: 98.4 °F (36.9 °C)   SpO2: 96%      Current Status 9/10/2018   ECOG score 1   Pain 0/10    Physical Exam   Constitutional: She is oriented to person, place, and time. She appears well-developed and well-nourished.   The patient is currently seated in a wheelchair in no distress.   HENT:   Mouth/Throat: Oropharynx is clear and moist.   Eyes: Conjunctivae are normal.   Neck: No thyromegaly present.   Cardiovascular: Normal rate and regular rhythm.  Exam reveals no gallop and no friction rub.    No murmur heard.  Pulmonary/Chest: Breath sounds normal. No respiratory distress.   Abdominal: Soft. Bowel sounds are normal. She exhibits no distension. There is no tenderness.   Musculoskeletal: She exhibits edema.   Lower extremity braces in place.  Trace bilateral lower extremity edema.   Lymphadenopathy:        Head (right side): No submandibular adenopathy present.     She has no cervical adenopathy.     She has no axillary adenopathy.        Right: No inguinal and no supraclavicular adenopathy present.        Left: No inguinal and no supraclavicular adenopathy present.   Neurological: She is alert and oriented to person, place, and time. No cranial nerve deficit.   Bilateral lower extremity strength, 4+/5   Skin: Skin is warm and dry. No rash noted.   Slight brownish discoloration the palms of hands, slight skin peeling   Psychiatric: She has a normal mood and affect. Her behavior is normal.       RECENT LABS:  Hematology WBC   Date Value Ref Range Status   09/10/2018 4.84 4.50 -  10.70 10*3/mm3 Final     RBC   Date Value Ref Range Status   09/10/2018 3.85 (L) 3.90 - 5.20 10*6/mm3 Final     Hemoglobin   Date Value Ref Range Status   09/10/2018 12.9 11.9 - 15.5 g/dL Final     Hematocrit   Date Value Ref Range Status   09/10/2018 38.5 35.6 - 45.5 % Final     Platelets   Date Value Ref Range Status   09/10/2018 226 140 - 500 10*3/mm3 Final        Lab Results   Component Value Date    GLUCOSE 109 (H) 09/07/2018    BUN 13 09/07/2018    CREATININE 0.80 09/07/2018    EGFRIFNONA 74 09/07/2018    EGFRIFAFRI 80 09/25/2017    BCR 16.3 09/07/2018    K 4.3 09/07/2018    CO2 28.8 09/07/2018    CALCIUM 9.8 09/07/2018    PROTENTOTREF 6.4 09/25/2017    ALBUMIN 3.90 09/07/2018    LABIL2 2.2 09/25/2017    AST 27 09/07/2018    ALT 46 (H) 09/07/2018         Assessment/Plan      1. Previous Stage Ib ([id number]) right breast cancer:  · Diagnosed May 2010 with excisional biopsy for invasive ductal carcinoma, 1.3 cm, grade 2, ER 90%, MN 80%, HER-2/peter negative (1+ IHC).    · Subsequent right mastectomy in July 2010 with no residual breast malignancy, 1/5 sentinel lymph node with micrometastasis (0.25 mm).    · Treated in the [facility] system with adjuvant AC ×4 cycles in 2010 (no taxanes administered due to underlying Charcot-Saloni-Tooth with peripheral neuropathy).    · Adjuvant Femara (postmenopausal) initiated October 2010 with plan to treat ×10 years.    · Genetic testing reportedly negative.    · Developed osteopenia treated with Prolia beginning 2/27/13. Subsequently discontinued due to identification of metastatic disease.  2. Recurrent/metastatic disease identified 10/8/17:  · Disease involving thoracic spine with cord compression at T6, lumbosacral involvement, sternal and right sternoclavicular involvement.    · Femara discontinued in 10/2017.    · Radiation administered (in the [facility] system) to the thoracic spine beginning 10/19/17 treating T3-T9 to a dose of 24 gray in 6 fractions.  · Evaluation with MRI  12/8/17 showing persistent T6 cord compression with persistent neurologic compromise requiring surgical treatment 12/11/17 with T6 laminectomy/corpectomy and T3-T9 fusion.  Pathology with metastatic carcinoma of breast origin, ER negative, MN negative, HER-2/peter negative (1+ IHC).  · Additional staging evaluation 12/8/17 with no evidence of visceral metastatic disease, bone scan showing involvement of thoracic spine, sternum, left humerus, mid frontal bone.  No plane film correlate of left humerus lesion.  MRI lumbar spine with small intradural L3 metastasis.  CA 15-3 12/6/17- 17.  · Palliative radiation therapy to L3 dural metastasis and left humerus initiated 1/15/18 (10 fractions), completed 1/26/18.  · Hypercalcemia of malignancy with calcium in the 10-11 range.  · Initiation of monthly Xgeva 1/23/18.  · Baseline CT scan 1/30/18 with no evidence of visceral involvement.  Cluster of nodular opacities in the right lower lobe suspected to be infectious or related to bronchiolitis. Bone scan 1/30/18 showed postsurgical change in the thoracic spine, stable uptake in the frontal bone, no new areas of disease.  · Initiation of palliative oral single agent Xeloda 2/7/18 2000 mg a.m., 1500 mg p.m. for 14/21 days.   · Following 3 cycles xeloda, bone scan 4/4/18 showed no change from the prior study.  CT scan 4/4/18 showed a small pericardial effusion of unclear significance as well as a subcutaneous nodule in the right lateral chest wall.  Subsequent evaluation with echocardiogram 4/17/18 showed no evidence of pericardial effusion.  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  We discussed the possibility of surgical excision of the right subcutaneous chest wall lesion for more definitive diagnosis.  Reviewed previous CT images dating back to 12/8/17 and the lesion was present  even at that time measuring around 1.7 cm although not commented on in the radiology report.  As this appears to be an indolent low-grade process unrelated to her breast cancer, recommendee foregoing surgical excision at this time and monitoring this area on future scans.  The patient agreed.    · Following 6 cycles of Xeloda, CT 6/6/18  showed stable findings, no evidence of progressive disease.  There was a comment regarding subcutaneous abnormality in the anterior abdominal wall and this was related to Lovenox injection sites.  Bone scan 6/6/18 showed no interval change.   · CT scan and bone scan 8/13/18 following 9 cycles of Xeloda showed stable findings with no evidence of significant visceral metastases.  Her bone lesions appear stable on bone scan.  The spindle cell neoplasm in her right chest wall actually decreased in size from 2 cm down to 1.6 cm.    · The patient experienced some symptoms of diarrhea, anorexia, generalized weakness during cycle 9 Xeloda it was unclear whether this was related to a viral gastroenteritis or toxicity from treatment.  Symptoms recurred during cycle 10 and treatment was cut short by 2 days.  She returns today in follow-up reporting resolution of the symptoms of increased tearing, diarrhea, generalized weakness, anorexia.  She is now back to her baseline.  Obviously the symptoms are related to toxicity from Xeloda and she will require a dose/schedule alteration.  I have suggested that we switch to a dose of 1500 mg twice daily for 7 days on followed by 7 days off area patient does agree.  She will return in 2 weeks for a nurse practitioner assessment and I will see her in 4 weeks for follow-up when she is due to begin cycle 12.  We will plan repeat scans at the end of cycle 12.  She is experiencing mild hand-foot symptoms and was encouraged to continue using emollient cream frequently.  She is due for monthly Xgeva today.  3. Right hip/gluteal hematoma:  · The patient had  developed considerable pain in the right hip and underwent MRI 3/22/18 visit showed a 5.7 x 4.3 x 11 cm hematoma in the right gluteal region.  · Lovenox was held 3/23 through 4/6/18.  Resolution of pain, Lovenox was resumed. Hematoma likely occurred due to minimal trauma from transfer out of her wheelchair.   4. Right pulmonary embolism:  · Diagnosed on CT angiogram 10/21/17 involving small right lower lobe pulmonary artery.  Lower extremity Dopplers negative.  · Bilateral lower extremity Dopplers negative again 12/5/17.  · Continuing on Lovenox 1 mg/kg (80 mg) subcutaneous injection every 12 hours.  Lovenox was held for 2 weeks due to right hip hematoma seen on MRI, treatment resumed on 4/6/18 with no further obvious bleeding issues.  5. Cancer related pain:  · Previously receiving Duragesic 50 µg patch every 72 hours along with Dilaudid 4 mg as needed for breakthrough pain  · The patient's pain improved over time and she was able to discontinue both Duragesic and Dilaudid in the interval.  · Patient does take occasional Flexeril at bedtime due to back spasm/pain when she has been more active.  · Right hip pain as noted above due to right gluteal hematoma, previously prescribed Dilaudid 4 mg every 4 hours as needed #60 with no refill.  Pain from hematoma resolved.  · The patient does have some occasional aggravation of her chronic back pain with significant rehabilitation activity and requires an occasional dose of Dilaudid.  Pain currently improved.  6. Chemotherapy-induced diarrhea:  · This did recur and the patient has Imodium and Lomotil to use as needed.  Symptoms currently resolved.  She did see GI and there was no recommendation to pursue any additional testing apart from stool studies which are pending.  She is on a probiotic as well.  7. Traumatic left tibia/fibular fracture:  · Status post ORIF 12/6/17  · Specimen was sent for pathologic review, negative for evidence of  malignancy  8. Hypercalcemia:  · Suspect hypercalcemia of malignancy, calcium in  10-11 range previously.  · Calcium normalized following initiation of monthly Xgeva on 1/23/18.  9. Chemotherapy-induced mucositis:  · Patient had a minimal degree of mucositis with cycle 2.  The patient has magic mouthwash to use as needed.  No subsequent mucositis.  10. Recurrent UTI, bladder wall thickening on CT:  · Patient had an enterococcal UTI on 3/2/18 sensitive to nitrofurantoin and received treatment, unclear how long.  · Recurrent UTI 3/20/18 with urine culture growing Klebsiella, initially treated with nitrofurantoin, transitioned to Levaquin.  · CT 4/4/18 with diffuse bladder wall thickening with increased nodular thickening at the left base.  Referral to urogynecology Dr. May Johnson.  She was placed on a prophylactic dose of trimethoprim 100 mg daily, bladder wall thickening felt to be related to recent recurrent urinary tract infections.   · The patient did have recent recurrent urinary tract infection symptoms and was treated empirically with course of Levaquin.  Repeat urine culture however on 9/17/18 is reviewed today and shows Enterococcus faecalis greater than 100,000 colonies that is resistant to Levaquin and sensitive to nitrofurantoin.  She is fairly asymptomatic and it is unclear whether she may be colonized.  We will go ahead and administer a course of nitrofurantoin 100 mg twice a day ×10 days.  11.   Mobility:  · The patient continues with aggressive outpatient physical therapy at [facility]. She continues to improve considerably and is now able to walk with assistance.  12.  Depression:  · The patient weaned herself off of Cymbalta and reports that her symptoms remain stable.  13. Hand-foot syndrome secondary to Xeloda:  · The patient has mild palmar and plantar erythema, minimal skin peeling.  She continues to use Eucerin cream multiple times throughout the day.  14. Elevated liver function studies:  · Liver  function studies increased 8/20/19 with ALT 98, AST 70, normal total bilirubin.  · Negative viral hepatitis A, B, and C panel 8/23/18  · Subsequent gradual improvement in liver function studies, currently with ALT minimally elevated at 39, normal AST 30, normal total bilirubin 0.4.  Likely related to hepatic steatosis seen on CT, no definitive evidence of metastatic liver lesions.     Plan:  1. Begin cycle 11 Xeloda with altered dosing to 1500 mg twice a day 7 days on followed by 7 days off  2. Monthly Xgeva today  3. Continue Lovenox 80 mg every 12 hours.  4. Prescription sent for nitrofurantoin 100 mg twice a day ×10 days  5. Continue outpatient physical therapy at [facility]  6. In 2 weeks CBC, CMP and RN review  7. In 4 weeks M.D. visit CBC, CMP, magnesium, phosphorus.  The patient will be due for Xgeva.  She will also be due to begin cycle 12 Xeloda.  We will plan repeat scans at the end of that cycle.

## 2018-09-10 NOTE — PROGRESS NOTES
Staff message rec from Dr Hancock-Xeloda dose will be changed. See below.    Ubaldo Hancock Jr., MD sent to [facility]             Letting you know we will be changing dose on xeloda to 1500mg bid 7 on/7 off. She has some left over to begin this cycle tomorrow.      I have escribed a new rx to [facility].

## 2018-09-14 ENCOUNTER — DOCUMENTATION (OUTPATIENT)
Dept: ONCOLOGY | Facility: CLINIC | Age: 58
End: 2018-09-14

## 2018-09-18 ENCOUNTER — OFFICE VISIT (OUTPATIENT)
Dept: ONCOLOGY | Facility: CLINIC | Age: 58
End: 2018-09-18

## 2018-09-18 ENCOUNTER — LAB (OUTPATIENT)
Dept: OTHER | Facility: HOSPITAL | Age: 58
End: 2018-09-18

## 2018-09-18 VITALS
TEMPERATURE: 97.9 F | HEIGHT: 61 IN | WEIGHT: 191.2 LBS | OXYGEN SATURATION: 97 % | RESPIRATION RATE: 16 BRPM | SYSTOLIC BLOOD PRESSURE: 125 MMHG | HEART RATE: 94 BPM | DIASTOLIC BLOOD PRESSURE: 83 MMHG | BODY MASS INDEX: 36.1 KG/M2

## 2018-09-18 DIAGNOSIS — Z17.1 MALIGNANT NEOPLASM OF OVERLAPPING SITES OF RIGHT BREAST IN FEMALE, ESTROGEN RECEPTOR NEGATIVE (HCC): ICD-10-CM

## 2018-09-18 DIAGNOSIS — C50.811 MALIGNANT NEOPLASM OF OVERLAPPING SITES OF RIGHT BREAST IN FEMALE, ESTROGEN RECEPTOR NEGATIVE (HCC): ICD-10-CM

## 2018-09-18 DIAGNOSIS — Z17.1 MALIGNANT NEOPLASM OF OVERLAPPING SITES OF RIGHT BREAST IN FEMALE, ESTROGEN RECEPTOR NEGATIVE (HCC): Primary | ICD-10-CM

## 2018-09-18 DIAGNOSIS — C50.811 MALIGNANT NEOPLASM OF OVERLAPPING SITES OF RIGHT BREAST IN FEMALE, ESTROGEN RECEPTOR NEGATIVE (HCC): Primary | ICD-10-CM

## 2018-09-18 LAB
ALBUMIN SERPL-MCNC: 4.1 G/DL (ref 3.5–5.2)
ALBUMIN/GLOB SERPL: 1.5 G/DL
ALP SERPL-CCNC: 84 U/L (ref 39–117)
ALT SERPL W P-5'-P-CCNC: 36 U/L (ref 1–33)
ANION GAP SERPL CALCULATED.3IONS-SCNC: 10.7 MMOL/L
AST SERPL-CCNC: 27 U/L (ref 1–32)
BASOPHILS # BLD AUTO: 0.07 10*3/MM3 (ref 0–0.2)
BASOPHILS NFR BLD AUTO: 1.2 % (ref 0–1.5)
BILIRUB SERPL-MCNC: 0.4 MG/DL (ref 0.1–1.2)
BUN BLD-MCNC: 18 MG/DL (ref 6–20)
BUN/CREAT SERPL: 21.2 (ref 7–25)
CALCIUM SPEC-SCNC: 9.5 MG/DL (ref 8.6–10.5)
CHLORIDE SERPL-SCNC: 104 MMOL/L (ref 98–107)
CO2 SERPL-SCNC: 26.3 MMOL/L (ref 22–29)
CREAT BLD-MCNC: 0.85 MG/DL (ref 0.57–1)
DEPRECATED RDW RBC AUTO: 56.1 FL (ref 37–54)
EOSINOPHIL # BLD AUTO: 0.69 10*3/MM3 (ref 0–0.7)
EOSINOPHIL NFR BLD AUTO: 11.9 % (ref 0.3–6.2)
ERYTHROCYTE [DISTWIDTH] IN BLOOD BY AUTOMATED COUNT: 15.3 % (ref 11.7–13)
GFR SERPL CREATININE-BSD FRML MDRD: 69 ML/MIN/1.73
GLOBULIN UR ELPH-MCNC: 2.7 GM/DL
GLUCOSE BLD-MCNC: 116 MG/DL (ref 65–99)
HCT VFR BLD AUTO: 40.8 % (ref 35.6–45.5)
HGB BLD-MCNC: 13.7 G/DL (ref 11.9–15.5)
IMM GRANULOCYTES # BLD: 0.04 10*3/MM3 (ref 0–0.03)
IMM GRANULOCYTES NFR BLD: 0.7 % (ref 0–0.5)
LYMPHOCYTES # BLD AUTO: 0.89 10*3/MM3 (ref 0.9–4.8)
LYMPHOCYTES NFR BLD AUTO: 15.3 % (ref 19.6–45.3)
MCH RBC QN AUTO: 33.4 PG (ref 26.9–32)
MCHC RBC AUTO-ENTMCNC: 33.6 G/DL (ref 32.4–36.3)
MCV RBC AUTO: 99.5 FL (ref 80.5–98.2)
MONOCYTES # BLD AUTO: 0.47 10*3/MM3 (ref 0.2–1.2)
MONOCYTES NFR BLD AUTO: 8.1 % (ref 5–12)
NEUTROPHILS # BLD AUTO: 3.64 10*3/MM3 (ref 1.9–8.1)
NEUTROPHILS NFR BLD AUTO: 62.8 % (ref 42.7–76)
NRBC BLD MANUAL-RTO: 0 /100 WBC (ref 0–0)
PLATELET # BLD AUTO: 281 10*3/MM3 (ref 140–500)
PMV BLD AUTO: 11 FL (ref 6–12)
POTASSIUM BLD-SCNC: 4.8 MMOL/L (ref 3.5–5.2)
PROT SERPL-MCNC: 6.8 G/DL (ref 6–8.5)
RBC # BLD AUTO: 4.1 10*6/MM3 (ref 3.9–5.2)
SODIUM BLD-SCNC: 141 MMOL/L (ref 136–145)
WBC NRBC COR # BLD: 5.8 10*3/MM3 (ref 4.5–10.7)

## 2018-09-18 PROCEDURE — 85025 COMPLETE CBC W/AUTO DIFF WBC: CPT | Performed by: INTERNAL MEDICINE

## 2018-09-18 PROCEDURE — 99213 OFFICE O/P EST LOW 20 MIN: CPT | Performed by: NURSE PRACTITIONER

## 2018-09-18 PROCEDURE — 36415 COLL VENOUS BLD VENIPUNCTURE: CPT

## 2018-09-18 PROCEDURE — 80053 COMPREHEN METABOLIC PANEL: CPT | Performed by: INTERNAL MEDICINE

## 2018-09-18 NOTE — PROGRESS NOTES
Subjective .     REASONS FOR FOLLOWUP:    1. Stage Ib ([id number]) right breast cancer: Diagnosed May 2010 with excisional biopsy for invasive ductal carcinoma, 1.3 cm, grade 2, ER 90%, KY 80%, HER-2/peter negative (1+ IHC).  Subsequent right mastectomy in July 2010 with no residual breast malignancy, 1/5 sentinel lymph node with micrometastasis (0.25 mm).  Treated in the [facility] system with adjuvant AC ×4 cycles in 2010 (no taxanes administered due to underlying Charcot-Saloni-Tooth with peripheral neuropathy).  Adjuvant Femara (postmenopausal) initiated October 2010 with plan to treat ×10 years.  Genetic testing reportedly negative.  Developed osteopenia treated with Prolia beginning 2/27/13.  2. Recurrent/metastatic disease identified 10/8/17 involving thoracic spine with cord compression at T6, lumbosacral involvement, sternal and right sternoclavicular involvement.  Femara discontinued.  Radiation administered (in the [facility] system) to the thoracic spine beginning 10/19/17 treating T3-T9 to a dose of 24 gray in 6 fractions.  3. Evaluation with MRI 12/8/17 showing persistent T6 cord compression with persistent neurologic compromise requiring surgical treatment 12/11/17 with T6 laminectomy/corpectomy and T3-T9 fusion.  Pathology with metastatic carcinoma of breast origin, ER negative, KY negative, HER-2/peter negative (1+ IHC).  4. Right pulmonary embolism 10/21/17 continuing on Lovenox 1 mg/kg (100 mg) every 12 hours.  No evidence of DVT.  Lovenox held due to right gluteus hematoma 3/23/18 through 4/6/18.  5. Cancer related pain previously on Duragesic 50 µg patch every 72 hours and Dilaudid 4 mg as needed for breakthrough pain.  Narcotics subsequently discontinued with improvement in pain in January 2018.  6. Radiation therapy to L3 dural metastasis and left humerus initiated 1/15/18 (10 fractions), completed 1/26/18  7. Monthly Xgeva initiated 1/23/18  8. Mild hypercalcemia of malignancy  9. Initiation of  palliative oral single agent Xeloda 2/7/18 (2000 mg a.m., 1500 mg p.m. for 14/21 days).  10. 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  In reviewing prior CT scans, has been present for some time.  Monitor for now, if it enlarges consider surgical excision.  11. Cumulative side effects from Xeloda with fatigue, anorexia, diarrhea, increased tearing.  Alteration in dose/schedule with cycle 11 to 1500 mg twice a day for 7 days on followed by 7 days off.    HISTORY OF PRESENT ILLNESS:  The patient is a 57-year-old female with the above-mentioned history who is here today for follow-up visit.  She continues on Xeloda 1500 mg twice daily, 7 days on, 7 days off.  She started her last cycle on 9/12/2018, and.  Today will be the last day of this cycle.  She reports that she is only having about one loose bowel movement per day.  She is taking 1 Lomotil per day.  She denies any issues with watery bowel movements.  She denies fevers or chills.  She reports that she is been feeling fairly well.  She does report a few small areas of skin rash that breakout intermittently.  She is using clindamycin on these areas when they do occur.  She denies any significant issues with hand-foot syndrome.    History of Present Illness       Past Medical History:   Diagnosis Date   • Acute pulmonary embolism, cancer-related 10/2017   • Allergic rhinitis    • Arthritis     Right knee; left shoulder   • Cancer (CMS/[facility]) 05/2010    Right breast   • Cancer (CMS/[facility]) 10/2017    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   • Charcot-Saloni-Tooth disease    • CPAP (continuous positive airway pressure) dependence    • GERD (gastroesophageal reflux disease)    • H/O Colon polyps    • H/O Uterine fibroid    • Heart murmur    • Hyperlipidemia    • Hypertension    • PONV (postoperative nausea  and vomiting)      Past Surgical History:   Procedure Laterality Date   • BLADDER SURGERY      Bladder lift   • BREAST RECONSTRUCTION, BREAST TISSUE EXPANDER INSERTION Right 2010   • BREAST SURGERY Right 2010    Mastectomy   •  SECTION  ,    • CHOLECYSTECTOMY     • COLONOSCOPY     • HERNIA REPAIR  2015    [facility]   • HYSTERECTOMY      Partial   • KNEE SURGERY Right    • LEG SURGERY Left     Broken   • MASTECTOMY Right 2010   • MI TREAT TIBIAL SHAFT FX, INTRAMED IMPLANT Left 2017    Procedure: TIBIA INTRAMEDULLARY NAIL/DON INSERTION;  Surgeon: Romero Shanks MD;  Location: [facility];  Service: Orthopedics   • THORACIC DECOMPRESSION POSTERIOR FUSION WITH INSTRUMENTATION N/A 2017    Procedure: T6 costotransversectomy and decompression with T3 to  T9 posterior spinal [facility];  Surgeon: Quan Nayak MD;  Location: [facility];  Service:        ONCOLOGIC HISTORY:  (History from previous dates can be found in the separate document.)    Current Outpatient Prescriptions on File Prior to Visit   Medication Sig Dispense Refill   • acetaminophen (TYLENOL) 500 MG tablet Take 500 mg by mouth Every 6 (Six) Hours As Needed for Mild Pain .     • calcium carbonate (OS-CARY) 600 MG tablet Take 600 mg by mouth 2 (Two) Times a Day With Meals.     • capecitabine (XELODA) 500 MG chemo tablet Take 3 tabs (1500 mg) po twice a day for 7 days on then 7 days off. 84 tablet 3   • cholecalciferol (VITAMIN D3) 1000 units tablet Take 1,000 Units by mouth Daily.     • cyclobenzaprine (FLEXERIL) 10 MG tablet Take 1 tablet by mouth 3 (Three) Times a Day As Needed for Muscle Spasms. 30 tablet 3   • diphenoxylate-atropine (LOMOTIL) 2.5-0.025 MG per tablet Take 1 tablet by mouth 4 (Four) Times a Day As Needed for Diarrhea. 60 tablet 2   • enoxaparin (LOVENOX) 80 MG/0.8ML solution syringe INJECT 0.8 ML UNDER THE SKIN Q 12 H  3   • FLONASE  ALLERGY RELIEF 50 MCG/ACT nasal spray 2 sprays into each nostril daily.  11   • gabapentin (NEURONTIN) 300 MG capsule Take 1 capsule by mouth 3 (Three) Times a Day. 90 capsule 2   • HYDROmorphone (DILAUDID) 4 MG tablet Take 1 tablet by mouth Every 4 (Four) Hours As Needed for Moderate Pain . 60 tablet 0   • mesalamine (LIALDA) 1.2 g EC tablet Take 1 tablet by mouth 2 (Two) Times a Day. 180 tablet 1   • nitrofurantoin (MACRODANTIN) 100 MG capsule Take 1 capsule by mouth 2 (Two) Times a Day. 20 capsule 0   • omeprazole (PriLOSEC) 40 MG capsule TAKE 1 CAPSULE DAILY 90 capsule 2   • ondansetron ODT (ZOFRAN-ODT) 8 MG disintegrating tablet Take 1 tablet by mouth Every 8 (Eight) Hours As Needed for Nausea or Vomiting. 30 tablet 1   • phenazopyridine (PYRIDIUM) 200 MG tablet Take 200 mg by mouth 3 (Three) Times a Day As Needed for bladder spasms.     • prochlorperazine (COMPAZINE) 10 MG tablet Take 1 tablet by mouth Every 6 (Six) Hours As Needed for Nausea or Vomiting. 30 tablet 0   • sennosides-docusate sodium (SENOKOT-S) 8.6-50 MG tablet Take 2 tablets by mouth 2 (Two) Times a Day. 90 tablet 2   • traMADol (ULTRAM) 50 MG tablet Take 1 tablet by mouth Every 8 (Eight) Hours As Needed for Moderate Pain . 90 tablet 2   • trimethoprim (TRIMPEX) 100 MG tablet Take 100 mg by mouth Daily.  2   • Tuberculin PPD (APLISOL ID) Inject  into the skin.     • enoxaparin (LOVENOX) 80 MG/0.8ML solution syringe INJECT 0.8 ML UNDER THE SKIN EVERY 12 HOURS 48 mL 1   • Unable to find SWISH AND SPIT 5 ML PO Q 2 H PRN  0     No current facility-administered medications on file prior to visit.        ALLERGIES:     Allergies   Allergen Reactions   • Hydrocodone Nausea Only   • Morphine And Related Nausea And Vomiting     Social History     Social History   • Marital status:      Spouse name: Murray   • Number of children: 3   • Years of education: College     Occupational History   •  [employer]   •  Retired     Social History Main Topics    • Smoking status: Never Smoker   • Smokeless tobacco: Never Used   • Alcohol use Yes      Comment: social   • Drug use: No   • Sexual activity: Defer     Other Topics Concern   • Not on file     Social History Narrative   • No narrative on file     Family History   Problem Relation Age of Onset   • Heart disease Mother    • Hyperlipidemia Mother    • Hypertension Mother    • Diabetes Father    • Charcot-Saloni-Tooth disease Father    • Heart disease Father    • Hypertension Father    • Heart failure Father    • Diabetes Sister    • Heart disease Sister    • Hypertension Sister    • Heart disease Maternal Aunt    • Scoliosis Maternal Aunt    • Heart disease Maternal Uncle    • Diabetes Maternal Grandmother    • Heart disease Maternal Grandmother    • Hypertension Maternal Grandmother    • Uterine cancer Maternal Grandmother    • Heart disease Maternal Grandfather      Review of Systems   Constitutional: Positive for fatigue. Negative for activity change, appetite change, fever and unexpected weight change.   HENT: Negative for congestion, mouth sores, nosebleeds, sore throat and voice change.    Eyes: Positive for discharge.   Respiratory: Negative for cough, shortness of breath and wheezing.    Cardiovascular: Negative for chest pain, palpitations and leg swelling.   Gastrointestinal: Positive for diarrhea (See HPI). Negative for abdominal distention, abdominal pain, blood in stool, constipation, nausea and vomiting.   Endocrine: Negative for cold intolerance and heat intolerance.   Genitourinary: Negative for difficulty urinating, dysuria, frequency and hematuria.   Musculoskeletal: Positive for back pain. Negative for arthralgias, joint swelling and myalgias.   Skin: Positive for rash (see HPI) and wound.   Neurological: Positive for weakness. Negative for dizziness, syncope, light-headedness, numbness and headaches.   Hematological: Negative for adenopathy. Does not bruise/bleed easily.   Psychiatric/Behavioral:  Negative for confusion and sleep disturbance. The patient is not nervous/anxious.      As of 9/18/2018 review of systems is unchanged except as noted.    Objective      Vitals:    09/18/18 0945   BP: 125/83   Pulse: 94   Resp: 16   Temp: 97.9 °F (36.6 °C)   SpO2: 97%      Current Status 9/18/2018   ECOG score 3   Pain 0/10    Physical Exam   Constitutional: She is oriented to person, place, and time. She appears well-developed and well-nourished. No distress.   Seated in a wheelchair, accompanied by her .   HENT:   Head: Normocephalic and atraumatic.   Mouth/Throat: Oropharynx is clear and moist and mucous membranes are normal. No oropharyngeal exudate.   Eyes: Pupils are equal, round, and reactive to light.   Neck: Normal range of motion.   Cardiovascular: Normal rate, regular rhythm and normal heart sounds.    No murmur heard.  Pulmonary/Chest: Effort normal and breath sounds normal. No respiratory distress. She has no wheezes. She has no rhonchi. She has no rales.   Abdominal: Soft. Normal appearance and bowel sounds are normal. She exhibits no distension. There is no hepatosplenomegaly.   Musculoskeletal: Normal range of motion. She exhibits edema (trace bilateral ankle).   Braces in place on bilateral lower extremities.   Neurological: She is alert and oriented to person, place, and time.   Skin: Skin is warm and dry. No rash noted.   Psychiatric: She has a normal mood and affect.   Vitals reviewed.      RECENT LABS:  Hematology WBC   Date Value Ref Range Status   09/18/2018 5.80 4.50 - 10.70 10*3/mm3 Final     RBC   Date Value Ref Range Status   09/18/2018 4.10 3.90 - 5.20 10*6/mm3 Final     Hemoglobin   Date Value Ref Range Status   09/18/2018 13.7 11.9 - 15.5 g/dL Final     Hematocrit   Date Value Ref Range Status   09/18/2018 40.8 35.6 - 45.5 % Final     Platelets   Date Value Ref Range Status   09/18/2018 281 140 - 500 10*3/mm3 Final        Lab Results   Component Value Date    GLUCOSE 116 (H)  09/18/2018    BUN 18 09/18/2018    CREATININE 0.85 09/18/2018    EGFRIFNONA 69 09/18/2018    EGFRIFAFRI 80 09/25/2017    BCR 21.2 09/18/2018    K 4.8 09/18/2018    CO2 26.3 09/18/2018    CALCIUM 9.5 09/18/2018    PROTENTOTREF 6.4 09/25/2017    ALBUMIN 4.10 09/18/2018    LABIL2 2.2 09/25/2017    AST 27 09/18/2018    ALT 36 (H) 09/18/2018         Assessment/Plan      1. Previous Stage Ib ([id number]) right breast cancer:  · Diagnosed May 2010 with excisional biopsy for invasive ductal carcinoma, 1.3 cm, grade 2, ER 90%, DC 80%, HER-2/peter negative (1+ IHC).    · Subsequent right mastectomy in July 2010 with no residual breast malignancy, 1/5 sentinel lymph node with micrometastasis (0.25 mm).    · Treated in the [facility] system with adjuvant AC ×4 cycles in 2010 (no taxanes administered due to underlying Charcot-Saloni-Tooth with peripheral neuropathy).    · Adjuvant Femara (postmenopausal) initiated October 2010 with plan to treat ×10 years.    · Genetic testing reportedly negative.    · Developed osteopenia treated with Prolia beginning 2/27/13. Subsequently discontinued due to identification of metastatic disease.  2. Recurrent/metastatic disease identified 10/8/17:  · Disease involving thoracic spine with cord compression at T6, lumbosacral involvement, sternal and right sternoclavicular involvement.    · Femara discontinued in 10/2017.    · Radiation administered (in the [facility] system) to the thoracic spine beginning 10/19/17 treating T3-T9 to a dose of 24 gray in 6 fractions.  · Evaluation with MRI 12/8/17 showing persistent T6 cord compression with persistent neurologic compromise requiring surgical treatment 12/11/17 with T6 laminectomy/corpectomy and T3-T9 fusion.  Pathology with metastatic carcinoma of breast origin, ER negative, DC negative, HER-2/peter negative (1+ IHC).  · Additional staging evaluation 12/8/17 with no evidence of visceral metastatic disease, bone scan showing involvement of thoracic spine,  sternum, left humerus, mid frontal bone.  No plane film correlate of left humerus lesion.  MRI lumbar spine with small intradural L3 metastasis.  CA 15-3 12/6/17- 17.  · Palliative radiation therapy to L3 dural metastasis and left humerus initiated 1/15/18 (10 fractions), completed 1/26/18.  · Hypercalcemia of malignancy with calcium in the 10-11 range.  · Initiation of monthly Xgeva 1/23/18.  · Baseline CT scan 1/30/18 with no evidence of visceral involvement.  Cluster of nodular opacities in the right lower lobe suspected to be infectious or related to bronchiolitis. Bone scan 1/30/18 showed postsurgical change in the thoracic spine, stable uptake in the frontal bone, no new areas of disease.  · Initiation of palliative oral single agent Xeloda 2/7/18 2000 mg a.m., 1500 mg p.m. for 14/21 days.   · Following 3 cycles xeloda, bone scan 4/4/18 showed no change from the prior study.  CT scan 4/4/18 showed a small pericardial effusion of unclear significance as well as a subcutaneous nodule in the right lateral chest wall.  Subsequent evaluation with echocardiogram 4/17/18 showed no evidence of pericardial effusion.  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  We discussed the possibility of surgical excision of the right subcutaneous chest wall lesion for more definitive diagnosis.  Reviewed previous CT images dating back to 12/8/17 and the lesion was present even at that time measuring around 1.7 cm although not commented on in the radiology report.  As this appears to be an indolent low-grade process unrelated to her breast cancer, recommendee foregoing surgical excision at this time and monitoring this area on future scans.  The patient agreed.    · Following 6 cycles of Xeloda, CT 6/6/18  showed stable findings, no evidence of progressive disease.  There was a comment  regarding subcutaneous abnormality in the anterior abdominal wall and this was related to Lovenox injection sites.  Bone scan 6/6/18 showed no interval change.   · CT scan and bone scan 8/13/18 following 9 cycles of Xeloda showed stable findings with no evidence of significant visceral metastases.  Her bone lesions appear stable on bone scan.  The spindle cell neoplasm in her right chest wall actually decreased in size from 2 cm down to 1.6 cm.    · The patient experienced some symptoms of diarrhea, anorexia, generalized weakness during cycle 9 Xeloda it was unclear whether this was related to a viral gastroenteritis or toxicity from treatment.  Symptoms recurred during cycle 10 and treatment was cut short by 2 days.  She returns today in follow-up reporting resolution of the symptoms of increased tearing, diarrhea, generalized weakness, anorexia.  She is now back to her baseline.  Obviously the symptoms are related to toxicity from Xeloda and she will require a dose/schedule alteration.  I have suggested that we switch to a dose of 1500 mg twice daily for 7 days on followed by 7 days off area patient does agree.  She will return in 2 weeks for a nurse practitioner assessment and I will see her in 4 weeks for follow-up when she is due to begin cycle 12.  We will plan repeat scans at the end of cycle 12.  She is experiencing mild hand-foot symptoms and was encouraged to continue using emollient cream frequently.  She is due for monthly Xgeva today.  · Patient returns 19 2018 for reevaluation and is tolerating her Xeloda quite well.  She is only having minimal diarrhea symptoms, and they're currently controlled with Lomotil.  3. Right hip/gluteal hematoma:  · The patient had developed considerable pain in the right hip and underwent MRI 3/22/18 visit showed a 5.7 x 4.3 x 11 cm hematoma in the right gluteal region.  · Lovenox was held 3/23 through 4/6/18.  Resolution of pain, Lovenox was resumed. Hematoma likely  occurred due to minimal trauma from transfer out of her wheelchair.   4. Right pulmonary embolism:  · Diagnosed on CT angiogram 10/21/17 involving small right lower lobe pulmonary artery.  Lower extremity Dopplers negative.  · Bilateral lower extremity Dopplers negative again 12/5/17.  · Continuing on Lovenox 1 mg/kg (80 mg) subcutaneous injection every 12 hours.  Lovenox was held for 2 weeks due to right hip hematoma seen on MRI, treatment resumed on 4/6/18 with no further obvious bleeding issues.  5. Cancer related pain:  · Previously receiving Duragesic 50 µg patch every 72 hours along with Dilaudid 4 mg as needed for breakthrough pain  · The patient's pain improved over time and she was able to discontinue both Duragesic and Dilaudid in the interval.  · Patient does take occasional Flexeril at bedtime due to back spasm/pain when she has been more active.  · Right hip pain as noted above due to right gluteal hematoma, previously prescribed Dilaudid 4 mg every 4 hours as needed #60 with no refill.  Pain from hematoma resolved.  · The patient does have some occasional aggravation of her chronic back pain with significant rehabilitation activity and requires an occasional dose of Dilaudid.  Pain currently improved.  6. Chemotherapy-induced diarrhea:  · This did recur and the patient has Imodium and Lomotil to use as needed.  Symptoms currently resolved.  She did see GI and there was no recommendation to pursue any additional testing apart from stool studies which are pending.  She is on a probiotic as well.  7. Traumatic left tibia/fibular fracture:  · Status post ORIF 12/6/17  · Specimen was sent for pathologic review, negative for evidence of malignancy  8. Hypercalcemia:  · Suspect hypercalcemia of malignancy, calcium in  10-11 range previously.  · Calcium normalized following initiation of monthly Xgeva on 1/23/18.  9. Chemotherapy-induced mucositis:  · Patient had a minimal degree of mucositis with cycle 2.   The patient has magic mouthwash to use as needed.  No subsequent mucositis.  10. Recurrent UTI, bladder wall thickening on CT:  · Patient had an enterococcal UTI on 3/2/18 sensitive to nitrofurantoin and received treatment, unclear how long.  · Recurrent UTI 3/20/18 with urine culture growing Klebsiella, initially treated with nitrofurantoin, transitioned to Levaquin.  · CT 4/4/18 with diffuse bladder wall thickening with increased nodular thickening at the left base.  Referral to urogynecology Dr. May Johnson.  She was placed on a prophylactic dose of trimethoprim 100 mg daily, bladder wall thickening felt to be related to recent recurrent urinary tract infections.   · The patient did have recent recurrent urinary tract infection symptoms and was treated empirically with course of Levaquin.  Repeat urine culture however on 9/17/18 is reviewed today and shows Enterococcus faecalis greater than 100,000 colonies that is resistant to Levaquin and sensitive to nitrofurantoin.  She is fairly asymptomatic and it is unclear whether she may be colonized.  We will go ahead and administer a course of nitrofurantoin 100 mg twice a day ×10 days.  11.   Mobility:  · The patient continues with aggressive outpatient physical therapy at [facility] [place]. She continues to improve considerably and is now able to walk with assistance.  12.  Depression:  · The patient weaned herself off of Cymbalta and reports that her symptoms remain stable.  13. Hand-foot syndrome secondary to Xeloda:  · The patient has mild palmar and plantar erythema, minimal skin peeling.  She continues to use Eucerin cream multiple times throughout the day.  14. Elevated liver function studies:  · Liver function studies increased 8/20/19 with ALT 98, AST 70, normal total bilirubin.  · Negative viral hepatitis A, B, and C panel 8/23/18  · Subsequent gradual improvement in liver function studies, currently with ALT minimally elevated at 39, normal AST 30, normal total  bilirubin 0.4.  Likely related to hepatic steatosis seen on CT, no definitive evidence of metastatic liver lesions.     Plan:  1. Patient will complete cycle 11 Xeloda today with altered dosing of 1500 mg twice a day 7 days on, 7 days off.  2. Return for follow-up visit in 2 weeks with Dr. Hacnock for reevaluation with CBC, CMP, magnesium, phosphorus.  Patient will also be due for Xgeva at that time.    3. Continue Lovenox 80 mg every 12 hours.  4. Continue outpatient physical therapy at [facility]  5. Plans to repeat scans at the end of cycle 12.

## 2018-09-19 ENCOUNTER — OFFICE VISIT (OUTPATIENT)
Dept: ORTHOPEDIC SURGERY | Facility: CLINIC | Age: 58
End: 2018-09-19

## 2018-09-19 VITALS — TEMPERATURE: 97.9 F | WEIGHT: 191 LBS | BODY MASS INDEX: 35.15 KG/M2 | HEIGHT: 62 IN

## 2018-09-19 DIAGNOSIS — Z87.81 STATUS POST OPEN REDUCTION WITH INTERNAL FIXATION (ORIF) OF FRACTURE OF ANKLE: ICD-10-CM

## 2018-09-19 DIAGNOSIS — M43.24 FUSION OF SPINE OF THORACIC REGION: Primary | ICD-10-CM

## 2018-09-19 DIAGNOSIS — Z98.890 STATUS POST OPEN REDUCTION WITH INTERNAL FIXATION (ORIF) OF FRACTURE OF ANKLE: ICD-10-CM

## 2018-09-19 PROCEDURE — 72070 X-RAY EXAM THORAC SPINE 2VWS: CPT | Performed by: ORTHOPAEDIC SURGERY

## 2018-09-19 PROCEDURE — 73590 X-RAY EXAM OF LOWER LEG: CPT | Performed by: ORTHOPAEDIC SURGERY

## 2018-09-19 PROCEDURE — 99213 OFFICE O/P EST LOW 20 MIN: CPT | Performed by: ORTHOPAEDIC SURGERY

## 2018-09-19 NOTE — PROGRESS NOTES
First, the leg it's completely healed without problems x-ray show a solidly healed fracture compared to prior films.  Nothing further to do here.  Secondly, she status post thoracic decompression and fusion covering very nicely from her paraplegia.  Metastatic breast cancer is presently well controlled, she doesn't have pain elsewhere and she is able to walk where as she was completely paralyzed previously.  Her bowel and bladder function are intact her wound looks great there is no tenderness to palpation about the back and x-rays suggest her fusion levels are solidly healed compared to prior films.  Overall she's doing as well as we can hope for.  Is some chronic right knee arthritis pain for which I gave her some Pennsaid samples and were going to soup up her walker.  I will see her when necessary

## 2018-10-08 ENCOUNTER — INFUSION (OUTPATIENT)
Dept: ONCOLOGY | Facility: HOSPITAL | Age: 58
End: 2018-10-08

## 2018-10-08 ENCOUNTER — OFFICE VISIT (OUTPATIENT)
Dept: ONCOLOGY | Facility: CLINIC | Age: 58
End: 2018-10-08

## 2018-10-08 ENCOUNTER — LAB (OUTPATIENT)
Dept: OTHER | Facility: HOSPITAL | Age: 58
End: 2018-10-08

## 2018-10-08 VITALS
TEMPERATURE: 99.3 F | DIASTOLIC BLOOD PRESSURE: 81 MMHG | OXYGEN SATURATION: 97 % | RESPIRATION RATE: 16 BRPM | HEIGHT: 61 IN | BODY MASS INDEX: 36.57 KG/M2 | SYSTOLIC BLOOD PRESSURE: 133 MMHG | HEART RATE: 106 BPM | WEIGHT: 193.7 LBS

## 2018-10-08 DIAGNOSIS — C50.811 MALIGNANT NEOPLASM OF OVERLAPPING SITES OF RIGHT BREAST IN FEMALE, ESTROGEN RECEPTOR NEGATIVE (HCC): ICD-10-CM

## 2018-10-08 DIAGNOSIS — Z17.1 MALIGNANT NEOPLASM OF OVERLAPPING SITES OF RIGHT BREAST IN FEMALE, ESTROGEN RECEPTOR NEGATIVE (HCC): Primary | ICD-10-CM

## 2018-10-08 DIAGNOSIS — C50.811 MALIGNANT NEOPLASM OF OVERLAPPING SITES OF RIGHT BREAST IN FEMALE, ESTROGEN RECEPTOR NEGATIVE (HCC): Primary | ICD-10-CM

## 2018-10-08 DIAGNOSIS — Z17.1 MALIGNANT NEOPLASM OF OVERLAPPING SITES OF RIGHT BREAST IN FEMALE, ESTROGEN RECEPTOR NEGATIVE (HCC): ICD-10-CM

## 2018-10-08 DIAGNOSIS — C79.51 BONE METASTASES: Primary | ICD-10-CM

## 2018-10-08 LAB
ALBUMIN SERPL-MCNC: 3.9 G/DL (ref 3.5–5.2)
ALBUMIN/GLOB SERPL: 1.4 G/DL
ALP SERPL-CCNC: 71 U/L (ref 39–117)
ALT SERPL W P-5'-P-CCNC: 17 U/L (ref 1–33)
ANION GAP SERPL CALCULATED.3IONS-SCNC: 12.7 MMOL/L
AST SERPL-CCNC: 17 U/L (ref 1–32)
BASOPHILS # BLD AUTO: 0.04 10*3/MM3 (ref 0–0.2)
BASOPHILS NFR BLD AUTO: 0.5 % (ref 0–1.5)
BILIRUB SERPL-MCNC: 0.3 MG/DL (ref 0.1–1.2)
BUN BLD-MCNC: 14 MG/DL (ref 6–20)
BUN/CREAT SERPL: 15.7 (ref 7–25)
CALCIUM SPEC-SCNC: 8.9 MG/DL (ref 8.6–10.5)
CHLORIDE SERPL-SCNC: 104 MMOL/L (ref 98–107)
CO2 SERPL-SCNC: 25.3 MMOL/L (ref 22–29)
CREAT BLD-MCNC: 0.89 MG/DL (ref 0.57–1)
DEPRECATED RDW RBC AUTO: 58.1 FL (ref 37–54)
EOSINOPHIL # BLD AUTO: 0.23 10*3/MM3 (ref 0–0.7)
EOSINOPHIL NFR BLD AUTO: 2.9 % (ref 0.3–6.2)
ERYTHROCYTE [DISTWIDTH] IN BLOOD BY AUTOMATED COUNT: 15.9 % (ref 11.7–13)
GFR SERPL CREATININE-BSD FRML MDRD: 65 ML/MIN/1.73
GLOBULIN UR ELPH-MCNC: 2.7 GM/DL
GLUCOSE BLD-MCNC: 139 MG/DL (ref 65–99)
HCT VFR BLD AUTO: 38.3 % (ref 35.6–45.5)
HGB BLD-MCNC: 12.9 G/DL (ref 11.9–15.5)
IMM GRANULOCYTES # BLD: 0.02 10*3/MM3 (ref 0–0.03)
IMM GRANULOCYTES NFR BLD: 0.3 % (ref 0–0.5)
LYMPHOCYTES # BLD AUTO: 0.92 10*3/MM3 (ref 0.9–4.8)
LYMPHOCYTES NFR BLD AUTO: 11.7 % (ref 19.6–45.3)
MAGNESIUM SERPL-MCNC: 1.9 MG/DL (ref 1.6–2.6)
MCH RBC QN AUTO: 33.5 PG (ref 26.9–32)
MCHC RBC AUTO-ENTMCNC: 33.7 G/DL (ref 32.4–36.3)
MCV RBC AUTO: 99.5 FL (ref 80.5–98.2)
MONOCYTES # BLD AUTO: 0.53 10*3/MM3 (ref 0.2–1.2)
MONOCYTES NFR BLD AUTO: 6.8 % (ref 5–12)
NEUTROPHILS # BLD AUTO: 6.1 10*3/MM3 (ref 1.9–8.1)
NEUTROPHILS NFR BLD AUTO: 77.8 % (ref 42.7–76)
NRBC BLD MANUAL-RTO: 0 /100 WBC (ref 0–0)
PHOSPHATE SERPL-MCNC: 3.9 MG/DL (ref 2.5–4.5)
PLATELET # BLD AUTO: 265 10*3/MM3 (ref 140–500)
PMV BLD AUTO: 10.4 FL (ref 6–12)
POTASSIUM BLD-SCNC: 4.6 MMOL/L (ref 3.5–5.2)
PROT SERPL-MCNC: 6.6 G/DL (ref 6–8.5)
RBC # BLD AUTO: 3.85 10*6/MM3 (ref 3.9–5.2)
SODIUM BLD-SCNC: 142 MMOL/L (ref 136–145)
WBC NRBC COR # BLD: 7.84 10*3/MM3 (ref 4.5–10.7)

## 2018-10-08 PROCEDURE — 83735 ASSAY OF MAGNESIUM: CPT | Performed by: INTERNAL MEDICINE

## 2018-10-08 PROCEDURE — 80053 COMPREHEN METABOLIC PANEL: CPT | Performed by: INTERNAL MEDICINE

## 2018-10-08 PROCEDURE — 96372 THER/PROPH/DIAG INJ SC/IM: CPT | Performed by: INTERNAL MEDICINE

## 2018-10-08 PROCEDURE — 25010000002 DENOSUMAB 120 MG/1.7ML SOLUTION: Performed by: INTERNAL MEDICINE

## 2018-10-08 PROCEDURE — 85025 COMPLETE CBC W/AUTO DIFF WBC: CPT | Performed by: INTERNAL MEDICINE

## 2018-10-08 PROCEDURE — 99214 OFFICE O/P EST MOD 30 MIN: CPT | Performed by: INTERNAL MEDICINE

## 2018-10-08 PROCEDURE — 84100 ASSAY OF PHOSPHORUS: CPT | Performed by: INTERNAL MEDICINE

## 2018-10-08 PROCEDURE — 36415 COLL VENOUS BLD VENIPUNCTURE: CPT

## 2018-10-08 RX ADMIN — DENOSUMAB 120 MG: 120 INJECTION SUBCUTANEOUS at 12:50

## 2018-10-08 NOTE — PROGRESS NOTES
Subjective .     REASONS FOR FOLLOWUP:    1. Stage Ib ([id number]) right breast cancer: Diagnosed May 2010 with excisional biopsy for invasive ductal carcinoma, 1.3 cm, grade 2, ER 90%, UT 80%, HER-2/peter negative (1+ IHC).  Subsequent right mastectomy in July 2010 with no residual breast malignancy, 1/5 sentinel lymph node with micrometastasis (0.25 mm).  Treated in the [facility] system with adjuvant AC ×4 cycles in 2010 (no taxanes administered due to underlying Charcot-Saloni-Tooth with peripheral neuropathy).  Adjuvant Femara (postmenopausal) initiated October 2010 with plan to treat ×10 years.  Genetic testing reportedly negative.  Developed osteopenia treated with Prolia beginning 2/27/13.  2. Recurrent/metastatic disease identified 10/8/17 involving thoracic spine with cord compression at T6, lumbosacral involvement, sternal and right sternoclavicular involvement.  Femara discontinued.  Radiation administered (in the [facility] system) to the thoracic spine beginning 10/19/17 treating T3-T9 to a dose of 24 gray in 6 fractions.  3. Evaluation with MRI 12/8/17 showing persistent T6 cord compression with persistent neurologic compromise requiring surgical treatment 12/11/17 with T6 laminectomy/corpectomy and T3-T9 fusion.  Pathology with metastatic carcinoma of breast origin, ER negative, UT negative, HER-2/peter negative (1+ IHC).  4. Right pulmonary embolism 10/21/17 continuing on Lovenox 1 mg/kg (100 mg) every 12 hours.  No evidence of DVT.  Lovenox held due to right gluteus hematoma 3/23/18 through 4/6/18.  5. Cancer related pain previously on Duragesic 50 µg patch every 72 hours and Dilaudid 4 mg as needed for breakthrough pain.  Narcotics subsequently discontinued with improvement in pain in January 2018.  6. Radiation therapy to L3 dural metastasis and left humerus initiated 1/15/18 (10 fractions), completed 1/26/18  7. Monthly Xgeva initiated 1/23/18  8. Mild hypercalcemia of malignancy  9. Initiation of  palliative oral single agent Xeloda 2/7/18 (2000 mg a.m., 1500 mg p.m. for 14/21 days).  10. 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  In reviewing prior CT scans, has been present for some time.  Monitor for now, if it enlarges consider surgical excision.  11. Cumulative side effects from Xeloda with fatigue, anorexia, diarrhea, increased tearing.  Alteration in dose/schedule with cycle 11 to 1500 mg twice a day for 7 days on followed by 7 days off.    HISTORY OF PRESENT ILLNESS:  The patient is a 57 y.o. year old female who is here for follow-up with the above-mentioned history.    History of Present Illness the patient returns today in follow-up due to begin cycle 12 Xeloda and also continuing on monthly Xgeva due today.  With altered dosing of Xeloda, the patient has had improved tolerance.  She reports improved fatigue.  She had only minimal loose stool/diarrhea.  She has only slight discoloration of her hands and feet with no significant skin peeling.  She denies any mucositis.  Overall, her cancer related pain as essentially resolved.  She does have some residual arthritic pain in her left shoulder and right knee and is going to undergo an injection for her knee this week.  She recovered from her urinary tract infection with resolution of her urinary symptoms.  She has not experienced any fevers.  She is continuing to improve with rehabilitation and today is using a rolling walker and has been much more active.  She is able to participate well with her physical therapy on the reduced dose of Xeloda.    Past Medical History:   Diagnosis Date   • Acute pulmonary embolism, cancer-related 10/2017   • Allergic rhinitis    • Arthritis     Right knee; left shoulder   • Cancer (CMS/[facility]) 05/2010    Right breast   • Cancer (CMS/[facility]) 10/2017     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   • Charcot-Saloni-Tooth disease    • CPAP (continuous positive airway pressure) dependence    • GERD (gastroesophageal reflux disease)    • H/O Colon polyps    • H/O Uterine fibroid    • Heart murmur    • Hyperlipidemia    • Hypertension    • PONV (postoperative nausea and vomiting)      Past Surgical History:   Procedure Laterality Date   • BLADDER SURGERY      Bladder lift   • BREAST RECONSTRUCTION, BREAST TISSUE EXPANDER INSERTION Right 2010   • BREAST SURGERY Right 2010    Mastectomy   •  SECTION  ,    • CHOLECYSTECTOMY     • COLONOSCOPY     • HERNIA REPAIR  2015    [facility]   • HYSTERECTOMY      Partial   • KNEE SURGERY Right    • LEG SURGERY Left     Broken   • MASTECTOMY Right 2010   • NH TREAT TIBIAL SHAFT FX, INTRAMED IMPLANT Left 2017    Procedure: TIBIA INTRAMEDULLARY NAIL/DON INSERTION;  Surgeon: Romero Shanks MD;  Location: [facility];  Service: Orthopedics   • THORACIC DECOMPRESSION POSTERIOR FUSION WITH INSTRUMENTATION N/A 2017    Procedure: T6 costotransversectomy and decompression with T3 to  T9 posterior spinal fusion with instrumentation with Jennifer Gonzalez and Nael Dennis Cellsaver;  Surgeon: Quan Nayak MD;  Location: [facility];  Service:        ONCOLOGIC HISTORY:  (History from previous dates can be found in the separate document.)    Current Outpatient Prescriptions on File Prior to Visit   Medication Sig Dispense Refill   • acetaminophen (TYLENOL) 500 MG tablet Take 500 mg by mouth Every 6 (Six) Hours As Needed for Mild Pain .     • calcium carbonate (OS-CARY) 600 MG tablet Take 600 mg by mouth 2 (Two) Times a Day With Meals.     • capecitabine (XELODA) 500 MG chemo tablet Take 3 tabs (1500 mg) po twice a day for 7 days on then 7 days off. 84 tablet 3   • cholecalciferol (VITAMIN D3) 1000 units tablet Take 1,000 Units by mouth Daily.     • cyclobenzaprine (FLEXERIL) 10 MG tablet Take 1 tablet by mouth 3 (Three)  Times a Day As Needed for Muscle Spasms. 30 tablet 3   • diphenoxylate-atropine (LOMOTIL) 2.5-0.025 MG per tablet Take 1 tablet by mouth 4 (Four) Times a Day As Needed for Diarrhea. 60 tablet 2   • enoxaparin (LOVENOX) 80 MG/0.8ML solution syringe INJECT 0.8 ML UNDER THE SKIN Q 12 H  3   • enoxaparin (LOVENOX) 80 MG/0.8ML solution syringe INJECT 0.8 ML UNDER THE SKIN EVERY 12 HOURS 48 mL 1   • FLONASE ALLERGY RELIEF 50 MCG/ACT nasal spray 2 sprays into each nostril daily.  11   • gabapentin (NEURONTIN) 300 MG capsule Take 1 capsule by mouth 3 (Three) Times a Day. 90 capsule 2   • HYDROmorphone (DILAUDID) 4 MG tablet Take 1 tablet by mouth Every 4 (Four) Hours As Needed for Moderate Pain . 60 tablet 0   • mesalamine (LIALDA) 1.2 g EC tablet Take 1 tablet by mouth 2 (Two) Times a Day. 180 tablet 1   • nitrofurantoin (MACRODANTIN) 100 MG capsule Take 1 capsule by mouth 2 (Two) Times a Day. 20 capsule 0   • omeprazole (PriLOSEC) 40 MG capsule TAKE 1 CAPSULE DAILY 90 capsule 2   • ondansetron ODT (ZOFRAN-ODT) 8 MG disintegrating tablet Take 1 tablet by mouth Every 8 (Eight) Hours As Needed for Nausea or Vomiting. 30 tablet 1   • phenazopyridine (PYRIDIUM) 200 MG tablet Take 200 mg by mouth 3 (Three) Times a Day As Needed for bladder spasms.     • prochlorperazine (COMPAZINE) 10 MG tablet Take 1 tablet by mouth Every 6 (Six) Hours As Needed for Nausea or Vomiting. 30 tablet 0   • sennosides-docusate sodium (SENOKOT-S) 8.6-50 MG tablet Take 2 tablets by mouth 2 (Two) Times a Day. 90 tablet 2   • traMADol (ULTRAM) 50 MG tablet Take 1 tablet by mouth Every 8 (Eight) Hours As Needed for Moderate Pain . 90 tablet 2   • trimethoprim (TRIMPEX) 100 MG tablet Take 100 mg by mouth Daily.  2   • Tuberculin PPD (APLISOL ID) Inject  into the skin.     • Unable to find SWISH AND SPIT 5 ML PO Q 2 H PRN  0     No current facility-administered medications on file prior to visit.        ALLERGIES:     Allergies   Allergen Reactions   •  Hydrocodone Nausea Only   • Morphine And Related Nausea And Vomiting     Social History     Social History   • Marital status:      Spouse name: Murray   • Number of children: 3   • Years of education: College     Occupational History   •  [employer]   •  Retired     Social History Main Topics   • Smoking status: Never Smoker   • Smokeless tobacco: Never Used   • Alcohol use Yes      Comment: social   • Drug use: No   • Sexual activity: Defer     Other Topics Concern   • Not on file     Social History Narrative   • No narrative on file     Family History   Problem Relation Age of Onset   • Heart disease Mother    • Hyperlipidemia Mother    • Hypertension Mother    • Diabetes Father    • Charcot-Saloni-Tooth disease Father    • Heart disease Father    • Hypertension Father    • Heart failure Father    • Diabetes Sister    • Heart disease Sister    • Hypertension Sister    • Heart disease Maternal Aunt    • Scoliosis Maternal Aunt    • Heart disease Maternal Uncle    • Diabetes Maternal Grandmother    • Heart disease Maternal Grandmother    • Hypertension Maternal Grandmother    • Uterine cancer Maternal Grandmother    • Heart disease Maternal Grandfather      Review of Systems   Constitutional: Positive for fatigue. Negative for activity change, appetite change, fever and unexpected weight change.   HENT: Negative for congestion, mouth sores, nosebleeds, sore throat and voice change.    Eyes: Negative for discharge.   Respiratory: Negative for cough, shortness of breath and wheezing.    Cardiovascular: Negative for chest pain, palpitations and leg swelling.   Gastrointestinal: Positive for diarrhea. Negative for abdominal distention, abdominal pain, blood in stool, constipation, nausea and vomiting.   Endocrine: Negative for cold intolerance and heat intolerance.   Genitourinary: Negative for difficulty urinating, dysuria, frequency and hematuria.   Musculoskeletal: Positive for arthralgias. Negative for back  pain, joint swelling and myalgias.   Skin: Negative for rash and wound.   Neurological: Negative for dizziness, syncope, weakness, light-headedness, numbness and headaches.   Hematological: Negative for adenopathy. Does not bruise/bleed easily.   Psychiatric/Behavioral: Negative for confusion and sleep disturbance. The patient is not nervous/anxious.          Objective      Vitals:    10/08/18 1128   BP: 133/81   Pulse: 106   Resp: 16   Temp: 99.3 °F (37.4 °C)   SpO2: 97%      Current Status 10/8/2018   ECOG score 1   Pain 0/10    Physical Exam   Constitutional: She is oriented to person, place, and time. She appears well-developed and well-nourished.   The patient is ambulating with a rolling walker today   HENT:   Mouth/Throat: Oropharynx is clear and moist.   Eyes: Conjunctivae are normal.   Neck: No thyromegaly present.   Cardiovascular: Normal rate and regular rhythm.  Exam reveals no gallop and no friction rub.    No murmur heard.  Pulmonary/Chest: Breath sounds normal. No respiratory distress.   Abdominal: Soft. Bowel sounds are normal. She exhibits no distension. There is no tenderness.   Musculoskeletal: She exhibits edema.   Lower extremity braces in place.  Trace bilateral lower extremity edema.   Lymphadenopathy:        Head (right side): No submandibular adenopathy present.     She has no cervical adenopathy.     She has no axillary adenopathy.        Right: No inguinal and no supraclavicular adenopathy present.        Left: No inguinal and no supraclavicular adenopathy present.   Neurological: She is alert and oriented to person, place, and time. No cranial nerve deficit.   Bilateral lower extremity strength, 4+/5   Skin: Skin is warm and dry. No rash noted.   Slight brownish discoloration the palms of hands, no skin peeling   Psychiatric: She has a normal mood and affect. Her behavior is normal.       RECENT LABS:  Hematology WBC   Date Value Ref Range Status   10/08/2018 7.84 4.50 - 10.70 10*3/mm3  Final     RBC   Date Value Ref Range Status   10/08/2018 3.85 (L) 3.90 - 5.20 10*6/mm3 Final     Hemoglobin   Date Value Ref Range Status   10/08/2018 12.9 11.9 - 15.5 g/dL Final     Hematocrit   Date Value Ref Range Status   10/08/2018 38.3 35.6 - 45.5 % Final     Platelets   Date Value Ref Range Status   10/08/2018 265 140 - 500 10*3/mm3 Final        Lab Results   Component Value Date    GLUCOSE 116 (H) 09/18/2018    BUN 18 09/18/2018    CREATININE 0.85 09/18/2018    EGFRIFNONA 69 09/18/2018    EGFRIFAFRI 80 09/25/2017    BCR 21.2 09/18/2018    K 4.8 09/18/2018    CO2 26.3 09/18/2018    CALCIUM 9.5 09/18/2018    PROTENTOTREF 6.4 09/25/2017    ALBUMIN 4.10 09/18/2018    LABIL2 2.2 09/25/2017    AST 27 09/18/2018    ALT 36 (H) 09/18/2018         Assessment/Plan      1. Previous Stage Ib ([id number]) right breast cancer:  · Diagnosed May 2010 with excisional biopsy for invasive ductal carcinoma, 1.3 cm, grade 2, ER 90%, [place] 80%, HER-2/peter negative (1+ IHC).    · Subsequent right mastectomy in July 2010 with no residual breast malignancy, 1/5 sentinel lymph node with micrometastasis (0.25 mm).    · Treated in the [facility] system with adjuvant AC ×4 cycles in 2010 (no taxanes administered due to underlying Charcot-Saloni-Tooth with peripheral neuropathy).    · Adjuvant Femara (postmenopausal) initiated October 2010 with plan to treat ×10 years.    · Genetic testing reportedly negative.    · Developed osteopenia treated with Prolia beginning 2/27/13. Subsequently discontinued due to identification of metastatic disease.  2. Recurrent/metastatic disease identified 10/8/17:  · Disease involving thoracic spine with cord compression at T6, lumbosacral involvement, sternal and right sternoclavicular involvement.    · Femara discontinued in 10/2017.    · Radiation administered (in the [facility] system) to the thoracic spine beginning 10/19/17 treating T3-T9 to a dose of 24 gray in 6 fractions.  · Evaluation with MRI 12/8/17 showing  persistent T6 cord compression with persistent neurologic compromise requiring surgical treatment 12/11/17 with T6 laminectomy/corpectomy and T3-T9 fusion.  Pathology with metastatic carcinoma of breast origin, ER negative, [place] negative, HER-2/peter negative (1+ IHC).  · Additional staging evaluation 12/8/17 with no evidence of visceral metastatic disease, bone scan showing involvement of thoracic spine, sternum, left humerus, mid frontal bone.  No plane film correlate of left humerus lesion.  MRI lumbar spine with small intradural L3 metastasis.  CA 15-3 12/6/17- 17.  · Palliative radiation therapy to L3 dural metastasis and left humerus initiated 1/15/18 (10 fractions), completed 1/26/18.  · Hypercalcemia of malignancy with calcium in the 10-11 range.  · Initiation of monthly Xgeva 1/23/18.  · Baseline CT scan 1/30/18 with no evidence of visceral involvement.  Cluster of nodular opacities in the right lower lobe suspected to be infectious or related to bronchiolitis. Bone scan 1/30/18 showed postsurgical change in the thoracic spine, stable uptake in the frontal bone, no new areas of disease.  · Initiation of palliative oral single agent Xeloda 2/7/18 2000 mg a.m., 1500 mg p.m. for 14/21 days.   · Following 3 cycles xeloda, bone scan 4/4/18 showed no change from the prior study.  CT scan 4/4/18 showed a small pericardial effusion of unclear significance as well as a subcutaneous nodule in the right lateral chest wall.  Subsequent evaluation with echocardiogram 4/17/18 showed no evidence of pericardial effusion.  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  We discussed the possibility of surgical excision of the right subcutaneous chest wall lesion for more definitive diagnosis.  Reviewed previous CT images dating back to 12/8/17 and the lesion was present even at that time  measuring around 1.7 cm although not commented on in the radiology report.  As this appears to be an indolent low-grade process unrelated to her breast cancer, recommendee foregoing surgical excision at this time and monitoring this area on future scans.  The patient agreed.    · Following 6 cycles of Xeloda, CT 6/6/18  showed stable findings, no evidence of progressive disease.  There was a comment regarding subcutaneous abnormality in the anterior abdominal wall and this was related to Lovenox injection sites.  Bone scan 6/6/18 showed no interval change.   · CT scan and bone scan 8/13/18 following 9 cycles of Xeloda showed stable findings with no evidence of significant visceral metastases.  Her bone lesions appear stable on bone scan.  The spindle cell neoplasm in her right chest wall actually decreased in size from 2 cm down to 1.6 cm.    · The patient experienced some symptoms of diarrhea, anorexia, generalized weakness during cycle 9 Xeloda it was unclear whether this was related to a viral gastroenteritis or toxicity from treatment.  Symptoms recurred during cycle 10 and treatment was cut short by 2 days.  Symptoms attributed to Xeloda.  With cycle 11, dose and schedule altered to 1500 mg twice daily for 7 days on followed by 7 days off .  · The patient returns today due to begin cycle 12 Xeloda and for monthly Xgeva.  With the dosing change noted above with cycle 11, the patient experienced much improved tolerance.  Fatigue has improved to the point where she has been more active with her rehabilitation and has made further progress.  Diarrhea has been improved and is tolerable by her report.  Hand-foot symptoms are minimal, only with slight discoloration of the hands and feet no peeling.  She denies any mucositis.  Counts are stable.  We will therefore proceed on with cycle 12 at the same dose and schedule.  In 3 weeks she will undergo CT scan and bone scan.  I will see her in 4 weeks for follow-up when she  is due for cycle 13 pending the scan reports.  3. Right hip/gluteal hematoma:  · The patient had developed considerable pain in the right hip and underwent MRI 3/22/18 visit showed a 5.7 x 4.3 x 11 cm hematoma in the right gluteal region.  · Lovenox was held 3/23 through 4/6/18.  Resolution of pain, Lovenox was resumed. Hematoma likely occurred due to minimal trauma from transfer out of her wheelchair.   4. Right pulmonary embolism:  · Diagnosed on CT angiogram 10/21/17 involving small right lower lobe pulmonary artery.  Lower extremity Dopplers negative.  · Bilateral lower extremity Dopplers negative again 12/5/17.  · Continuing on Lovenox 1 mg/kg (80 mg) subcutaneous injection every 12 hours.  Lovenox was held for 2 weeks due to right hip hematoma seen on MRI, treatment resumed on 4/6/18 with no further obvious bleeding issues.  5. Cancer related pain:  · Previously receiving Duragesic 50 µg patch every 72 hours along with Dilaudid 4 mg as needed for breakthrough pain  · The patient's pain improved over time and she was able to discontinue both Duragesic and Dilaudid in the interval.  · Patient does take occasional Flexeril at bedtime due to back spasm/pain when she has been more active.  · Right hip pain as noted above due to right gluteal hematoma, previously prescribed Dilaudid 4 mg every 4 hours as needed #60 with no refill.  Pain from hematoma resolved.  · The patient does have some occasional aggravation of her chronic back pain with significant rehabilitation activity and requires an occasional dose of Dilaudid.  Pain currently improved.  6. Chemotherapy-induced diarrhea:  · This did recur and the patient has Imodium and Lomotil to use as needed.  Symptoms currently resolved.  She did see GI and there was no recommendation to pursue any additional testing apart from stool studies which are pending.  She is on a probiotic as well.  · Symptoms improved on reduced dose Xeloda  7. Traumatic left tibia/fibular  fracture:  · Status post ORIF 12/6/17  · Specimen was sent for pathologic review, negative for evidence of malignancy  8. Hypercalcemia:  · Suspect hypercalcemia of malignancy, calcium in  10-11 range previously.  · Calcium normalized following initiation of monthly Xgeva on 1/23/18.  9. Chemotherapy-induced mucositis:  · Patient had a minimal degree of mucositis with cycle 2.  The patient has magic mouthwash to use as needed.  No subsequent mucositis.  10. Recurrent UTI, bladder wall thickening on CT:  · Patient had an enterococcal UTI on 3/2/18 sensitive to nitrofurantoin and received treatment, unclear how long.  · Recurrent UTI 3/20/18 with urine culture growing Klebsiella, initially treated with nitrofurantoin, transitioned to Levaquin.  · CT 4/4/18 with diffuse bladder wall thickening with increased nodular thickening at the left base.  Referral to urogynecology Dr. May Johnson.  She was placed on a prophylactic dose of trimethoprim 100 mg daily, bladder wall thickening felt to be related to recent recurrent urinary tract infections.   · Enterococcus faecalis greater than 100,000 colonies was treated for 10 days with nitrofurantoin at the last visit with resolution of symptoms.  11.   Mobility:  · The patient continues with aggressive outpatient physical therapy at [facility].   · She continues to improve considerably present today for the first time out of the wheelchair and using a rolling walker.  12.  Depression:  · The patient weaned herself off of Cymbalta and reports that her symptoms remain stable.  13. Hand-foot syndrome secondary to Xeloda:  · The patient has mild palmar and plantar erythema, no current skin peeling.  She continues to use Eucerin cream multiple times throughout the day.  14. Elevated liver function studies:  · Liver function studies increased 8/20/19 with ALT 98, AST 70, normal total bilirubin.  · Negative viral hepatitis A, B, and C panel 8/23/18  · Likely related to hepatic steatosis  seen on CT, no definitive evidence of metastatic liver lesions.   · Gradual improvement of LFTs, currently normal today.    Plan:  1. Begin cycle 12 Xeloda continuing with 1500 mg twice a day 7 days on followed by 7 days off  2. Monthly Xgeva today  3. Continue Lovenox 80 mg every 12 hours.  4. Continue outpatient physical therapy at [facility]  5. In 3 weeks CT chest abdomen pelvis and bone scan  6. In 4 weeks M.D. visit CBC, CMP, magnesium, phosphorus.  The patient will be due for Xgeva.  She will also be due to begin cycle 13 Xeloda pending results from CT and bone scan.

## 2018-10-10 RX ORDER — MESALAMINE 1.2 G/1
TABLET, DELAYED RELEASE ORAL
Qty: 180 TABLET | Refills: 0 | Status: SHIPPED | OUTPATIENT
Start: 2018-10-10 | End: 2019-09-04 | Stop reason: SDUPTHER

## 2018-10-11 ENCOUNTER — EPISODE CHANGES (OUTPATIENT)
Dept: CASE MANAGEMENT | Facility: OTHER | Age: 58
End: 2018-10-11

## 2018-10-24 DIAGNOSIS — G95.20 CORD COMPRESSION (HCC): ICD-10-CM

## 2018-10-30 ENCOUNTER — OFFICE VISIT (OUTPATIENT)
Dept: INTERNAL MEDICINE | Facility: CLINIC | Age: 58
End: 2018-10-30

## 2018-10-30 VITALS
SYSTOLIC BLOOD PRESSURE: 132 MMHG | HEART RATE: 94 BPM | WEIGHT: 194 LBS | BODY MASS INDEX: 36.68 KG/M2 | DIASTOLIC BLOOD PRESSURE: 74 MMHG | OXYGEN SATURATION: 98 %

## 2018-10-30 DIAGNOSIS — Z17.1 MALIGNANT NEOPLASM OF OVERLAPPING SITES OF RIGHT BREAST IN FEMALE, ESTROGEN RECEPTOR NEGATIVE (HCC): Primary | ICD-10-CM

## 2018-10-30 DIAGNOSIS — C50.811 MALIGNANT NEOPLASM OF OVERLAPPING SITES OF RIGHT BREAST IN FEMALE, ESTROGEN RECEPTOR NEGATIVE (HCC): Primary | ICD-10-CM

## 2018-10-30 DIAGNOSIS — I10 ESSENTIAL HYPERTENSION: ICD-10-CM

## 2018-10-30 DIAGNOSIS — G60.0 HEREDITARY SENSORIMOTOR NEUROPATHY: ICD-10-CM

## 2018-10-30 DIAGNOSIS — G95.20 CORD COMPRESSION (HCC): ICD-10-CM

## 2018-10-30 PROCEDURE — G0009 ADMIN PNEUMOCOCCAL VACCINE: HCPCS | Performed by: INTERNAL MEDICINE

## 2018-10-30 PROCEDURE — 90732 PPSV23 VACC 2 YRS+ SUBQ/IM: CPT | Performed by: INTERNAL MEDICINE

## 2018-10-30 PROCEDURE — 99214 OFFICE O/P EST MOD 30 MIN: CPT | Performed by: INTERNAL MEDICINE

## 2018-10-30 RX ORDER — DIAZEPAM 10 MG/1
10 TABLET ORAL EVERY 12 HOURS PRN
Qty: 10 TABLET | Refills: 1 | Status: SHIPPED | OUTPATIENT
Start: 2018-10-30 | End: 2018-10-30 | Stop reason: SDUPTHER

## 2018-10-30 RX ORDER — TRAMADOL HYDROCHLORIDE 50 MG/1
50 TABLET ORAL EVERY 8 HOURS PRN
Qty: 90 TABLET | Refills: 2 | Status: SHIPPED | OUTPATIENT
Start: 2018-10-30 | End: 2019-05-06 | Stop reason: SDUPTHER

## 2018-10-30 RX ORDER — DIAZEPAM 10 MG/1
10 TABLET ORAL EVERY 12 HOURS PRN
Qty: 10 TABLET | Refills: 1 | Status: SHIPPED | OUTPATIENT
Start: 2018-10-30 | End: 2020-12-02

## 2018-10-30 NOTE — PROGRESS NOTES
Chief Complaint   Patient presents with   • Hypertension   • Hyperlipidemia       History of Present Illness   Martha Davey is a 57 y.o. female presents for a follow up evaluation. She in general is doing well. 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 She she has advanced will with [facility]. Ambulating with a walker. This is going very well and she may look to transition to aquatic therapy in the new year.   Chronic OA with B shoulder pain R>L. She is taking tramadol in an as needed basis for this pain. She tried voltaren gel with no real benefit. She had an injection in her left shoulder last year and did get some temporary relief with this.         The following portions of the patient's history were reviewed and updated as appropriate: allergies, current medications, past family history, past medical history, past social history, past surgical history and problem list.  Current Outpatient Prescriptions on File Prior to Visit   Medication Sig Dispense Refill   • acetaminophen (TYLENOL) 500 MG tablet Take 500 mg by mouth Every 6 (Six) Hours As Needed for Mild Pain .     • calcium carbonate (OS-CARY) 600 MG tablet Take 600 mg by mouth 2 (Two) Times a Day With Meals.     • capecitabine (XELODA) 500 MG chemo tablet Take 3 tabs (1500 mg) po twice a day for 7 days on then 7 days off. 84 tablet 3   • cholecalciferol (VITAMIN D3) 1000 units tablet Take 1,000 Units by mouth Daily.     • cyclobenzaprine (FLEXERIL) 10 MG tablet Take 1 tablet by mouth 3 (Three) Times a Day As Needed for Muscle Spasms. 30 tablet 3   • diphenoxylate-atropine (LOMOTIL) 2.5-0.025 MG per tablet Take 1 tablet by mouth 4 (Four) Times a Day As Needed for Diarrhea. 60 tablet 2   • enoxaparin (LOVENOX) 80 MG/0.8ML solution syringe INJECT 0.8 ML UNDER THE SKIN Q 12 H  3   • FLONASE ALLERGY RELIEF 50 MCG/ACT nasal spray 2 sprays into each nostril daily.  11   • gabapentin (NEURONTIN) 300 MG capsule Take 1 capsule by mouth 3 (Three)  Times a Day. 90 capsule 2   • HYDROmorphone (DILAUDID) 4 MG tablet Take 1 tablet by mouth Every 4 (Four) Hours As Needed for Moderate Pain . 60 tablet 0   • mesalamine (LIALDA) 1.2 g EC tablet TAKE 1 TABLET BY MOUTH TWICE DAILY 180 tablet 0   • omeprazole (PriLOSEC) 40 MG capsule TAKE 1 CAPSULE DAILY 90 capsule 2   • ondansetron ODT (ZOFRAN-ODT) 8 MG disintegrating tablet Take 1 tablet by mouth Every 8 (Eight) Hours As Needed for Nausea or Vomiting. 30 tablet 1   • phenazopyridine (PYRIDIUM) 200 MG tablet Take 200 mg by mouth 3 (Three) Times a Day As Needed for bladder spasms.     • prochlorperazine (COMPAZINE) 10 MG tablet Take 1 tablet by mouth Every 6 (Six) Hours As Needed for Nausea or Vomiting. 30 tablet 0   • traMADol (ULTRAM) 50 MG tablet Take 1 tablet by mouth Every 8 (Eight) Hours As Needed for Moderate Pain . 90 tablet 2   • trimethoprim (TRIMPEX) 100 MG tablet Take 100 mg by mouth Daily.  2   • Tuberculin PPD (APLISOL ID) Inject  into the skin.     • Unable to find SWISH AND SPIT 5 ML PO Q 2 H PRN  0   • enoxaparin (LOVENOX) 80 MG/0.8ML solution syringe INJECT 0.8 ML UNDER THE SKIN EVERY 12 HOURS 48 mL 1   • nitrofurantoin (MACRODANTIN) 100 MG capsule Take 1 capsule by mouth 2 (Two) Times a Day. 20 capsule 0   • sennosides-docusate sodium (SENOKOT-S) 8.6-50 MG tablet Take 2 tablets by mouth 2 (Two) Times a Day. 90 tablet 2     No current facility-administered medications on file prior to visit.      Review of Systems   Constitutional: Negative.    HENT: Negative.    Eyes: Negative.    Respiratory: Negative.    Cardiovascular: Negative.    Gastrointestinal: Negative.    Endocrine: Negative.    Genitourinary: Negative.    Musculoskeletal: Negative.    Skin: Negative.    Allergic/Immunologic: Negative.    Neurological: Negative.    Hematological: Negative.    Psychiatric/Behavioral: Negative.        Objective   Physical Exam   Constitutional: She is oriented to person, place, and time. She appears  well-developed and well-nourished.   HENT:   Head: Normocephalic and atraumatic.   Right Ear: Hearing, tympanic membrane and external ear normal.   Left Ear: Hearing, tympanic membrane and external ear normal.   Nose: Nose normal.   Mouth/Throat: Oropharynx is clear and moist.   Eyes: Pupils are equal, round, and reactive to light. Conjunctivae and EOM are normal.   Neck: Normal range of motion. Neck supple. No thyromegaly present.   Cardiovascular: Normal rate, regular rhythm, normal heart sounds and intact distal pulses.    No murmur heard.  Pulmonary/Chest: Effort normal and breath sounds normal. Right breast exhibits no mass. Left breast exhibits no mass.   Abdominal: Soft. Bowel sounds are normal. She exhibits no distension. There is no hepatosplenomegaly. There is no tenderness.   Genitourinary: No breast tenderness.   Musculoskeletal: Normal range of motion.   Lymphadenopathy:     She has no cervical adenopathy.   Neurological: She is alert and oriented to person, place, and time.   Skin: Skin is warm and dry.   Psychiatric: She has a normal mood and affect. Her speech is normal and behavior is normal. Judgment and thought content normal. Cognition and memory are normal.   Nursing note and vitals reviewed.       /74   Pulse 94   Wt 88 kg (194 lb)   LMP  (LMP Unknown)   SpO2 98%   BMI 36.68 kg/m²     Assessment/Plan   Diagnoses and all orders for this visit:    Malignant neoplasm of overlapping sites of right breast in female, estrogen receptor negative (CMS/HCC)    Cord compression (CMS/HCC)    Essential hypertension    Hereditary sensorimotor neuropathy    Other orders  -     Discontinue: diazePAM (VALIUM) 10 MG tablet; Take 1 tablet by mouth Every 12 (Twelve) Hours As Needed for Anxiety.  -     diazePAM (VALIUM) 10 MG tablet; Take 1 tablet by mouth Every 12 (Twelve) Hours As Needed for Anxiety.    patient with mets breast cancer. Doing very well today. She will use valium prior to scans and a rx  has been sent for this. She is aware of the risks and benefits of this medication. She will continue current meds for bp. She is having some pain at night. May take 75 mg tramadol if needed and may add tylenol to this. Labs reviewed with patient. Follow up w/ ortho prn. Switch to water physical therapy in 2019. F/u her in

## 2018-10-31 ENCOUNTER — HOSPITAL ENCOUNTER (OUTPATIENT)
Dept: NUCLEAR MEDICINE | Facility: HOSPITAL | Age: 58
Discharge: HOME | End: 2018-10-31
Attending: INTERNAL MEDICINE

## 2018-10-31 ENCOUNTER — HOSPITAL ENCOUNTER (OUTPATIENT)
Dept: CT IMAGING | Facility: HOSPITAL | Age: 58
Discharge: HOME | End: 2018-10-31
Attending: INTERNAL MEDICINE

## 2018-10-31 DIAGNOSIS — Z17.1 MALIGNANT NEOPLASM OF OVERLAPPING SITES OF RIGHT BREAST IN FEMALE, ESTROGEN RECEPTOR NEGATIVE (HCC): ICD-10-CM

## 2018-10-31 DIAGNOSIS — C50.811 MALIGNANT NEOPLASM OF OVERLAPPING SITES OF RIGHT BREAST IN FEMALE, ESTROGEN RECEPTOR NEGATIVE (HCC): ICD-10-CM

## 2018-10-31 LAB — CREAT BLDA-MCNC: 0.9 MG/DL (ref 0.6–1.3)

## 2018-10-31 PROCEDURE — 74177 CT ABD & PELVIS W/CONTRAST: CPT

## 2018-10-31 PROCEDURE — 25010000002 IOPAMIDOL 61 % SOLUTION: Performed by: INTERNAL MEDICINE

## 2018-10-31 PROCEDURE — A9503 TC99M MEDRONATE: HCPCS | Performed by: INTERNAL MEDICINE

## 2018-10-31 PROCEDURE — 78306 BONE IMAGING WHOLE BODY: CPT

## 2018-10-31 PROCEDURE — 0 TECHNETIUM MEDRONATE KIT: Performed by: INTERNAL MEDICINE

## 2018-10-31 PROCEDURE — 82565 ASSAY OF CREATININE: CPT

## 2018-10-31 PROCEDURE — 71260 CT THORAX DX C+: CPT

## 2018-10-31 RX ORDER — TC 99M MEDRONATE 20 MG/10ML
21.3 INJECTION, POWDER, LYOPHILIZED, FOR SOLUTION INTRAVENOUS
Status: COMPLETED | OUTPATIENT
Start: 2018-10-31 | End: 2018-10-31

## 2018-10-31 RX ADMIN — IOPAMIDOL 95 ML: 612 INJECTION, SOLUTION INTRAVENOUS at 11:51

## 2018-10-31 RX ADMIN — Medication 21.3 MILLICURIE: at 11:20

## 2018-11-05 ENCOUNTER — OFFICE VISIT (OUTPATIENT)
Dept: ONCOLOGY | Facility: CLINIC | Age: 58
End: 2018-11-05

## 2018-11-05 ENCOUNTER — LAB (OUTPATIENT)
Dept: OTHER | Facility: HOSPITAL | Age: 58
End: 2018-11-05

## 2018-11-05 ENCOUNTER — INFUSION (OUTPATIENT)
Dept: ONCOLOGY | Facility: HOSPITAL | Age: 58
End: 2018-11-05

## 2018-11-05 VITALS
SYSTOLIC BLOOD PRESSURE: 108 MMHG | HEIGHT: 61 IN | TEMPERATURE: 97.8 F | HEART RATE: 98 BPM | BODY MASS INDEX: 36.65 KG/M2 | RESPIRATION RATE: 16 BRPM | WEIGHT: 194.1 LBS | DIASTOLIC BLOOD PRESSURE: 80 MMHG | OXYGEN SATURATION: 98 %

## 2018-11-05 DIAGNOSIS — C50.811 MALIGNANT NEOPLASM OF OVERLAPPING SITES OF RIGHT BREAST IN FEMALE, ESTROGEN RECEPTOR NEGATIVE (HCC): ICD-10-CM

## 2018-11-05 DIAGNOSIS — Z17.1 MALIGNANT NEOPLASM OF OVERLAPPING SITES OF RIGHT BREAST IN FEMALE, ESTROGEN RECEPTOR NEGATIVE (HCC): Primary | ICD-10-CM

## 2018-11-05 DIAGNOSIS — Z17.1 MALIGNANT NEOPLASM OF OVERLAPPING SITES OF RIGHT BREAST IN FEMALE, ESTROGEN RECEPTOR NEGATIVE (HCC): ICD-10-CM

## 2018-11-05 DIAGNOSIS — C50.811 MALIGNANT NEOPLASM OF OVERLAPPING SITES OF RIGHT BREAST IN FEMALE, ESTROGEN RECEPTOR NEGATIVE (HCC): Primary | ICD-10-CM

## 2018-11-05 LAB
ALBUMIN SERPL-MCNC: 4.2 G/DL (ref 3.5–5.2)
ALBUMIN/GLOB SERPL: 1.5 G/DL
ALP SERPL-CCNC: 67 U/L (ref 39–117)
ALT SERPL W P-5'-P-CCNC: 17 U/L (ref 1–33)
ANION GAP SERPL CALCULATED.3IONS-SCNC: 10.4 MMOL/L
AST SERPL-CCNC: 18 U/L (ref 1–32)
BASOPHILS # BLD AUTO: 0.04 10*3/MM3 (ref 0–0.2)
BASOPHILS NFR BLD AUTO: 1 % (ref 0–1.5)
BILIRUB SERPL-MCNC: 0.3 MG/DL (ref 0.1–1.2)
BUN BLD-MCNC: 15 MG/DL (ref 6–20)
BUN/CREAT SERPL: 15.8 (ref 7–25)
CALCIUM SPEC-SCNC: 9.8 MG/DL (ref 8.6–10.5)
CHLORIDE SERPL-SCNC: 102 MMOL/L (ref 98–107)
CO2 SERPL-SCNC: 28.6 MMOL/L (ref 22–29)
CREAT BLD-MCNC: 0.95 MG/DL (ref 0.57–1)
DEPRECATED RDW RBC AUTO: 56.4 FL (ref 37–54)
EOSINOPHIL # BLD AUTO: 0.12 10*3/MM3 (ref 0–0.7)
EOSINOPHIL NFR BLD AUTO: 3.1 % (ref 0.3–6.2)
ERYTHROCYTE [DISTWIDTH] IN BLOOD BY AUTOMATED COUNT: 15.9 % (ref 11.7–13)
GFR SERPL CREATININE-BSD FRML MDRD: 61 ML/MIN/1.73
GLOBULIN UR ELPH-MCNC: 2.8 GM/DL
GLUCOSE BLD-MCNC: 122 MG/DL (ref 65–99)
HCT VFR BLD AUTO: 40.7 % (ref 35.6–45.5)
HGB BLD-MCNC: 13.5 G/DL (ref 11.9–15.5)
IMM GRANULOCYTES # BLD: 0.01 10*3/MM3 (ref 0–0.03)
IMM GRANULOCYTES NFR BLD: 0.3 % (ref 0–0.5)
LYMPHOCYTES # BLD AUTO: 1.06 10*3/MM3 (ref 0.9–4.8)
LYMPHOCYTES NFR BLD AUTO: 27.7 % (ref 19.6–45.3)
MAGNESIUM SERPL-MCNC: 2 MG/DL (ref 1.6–2.6)
MCH RBC QN AUTO: 32.2 PG (ref 26.9–32)
MCHC RBC AUTO-ENTMCNC: 33.2 G/DL (ref 32.4–36.3)
MCV RBC AUTO: 97.1 FL (ref 80.5–98.2)
MONOCYTES # BLD AUTO: 0.38 10*3/MM3 (ref 0.2–1.2)
MONOCYTES NFR BLD AUTO: 9.9 % (ref 5–12)
NEUTROPHILS # BLD AUTO: 2.21 10*3/MM3 (ref 1.9–8.1)
NEUTROPHILS NFR BLD AUTO: 58 % (ref 42.7–76)
NRBC BLD MANUAL-RTO: 0 /100 WBC (ref 0–0)
PHOSPHATE SERPL-MCNC: 3.5 MG/DL (ref 2.5–4.5)
PLATELET # BLD AUTO: 298 10*3/MM3 (ref 140–500)
PMV BLD AUTO: 10.5 FL (ref 6–12)
POTASSIUM BLD-SCNC: 4.7 MMOL/L (ref 3.5–5.2)
PROT SERPL-MCNC: 7 G/DL (ref 6–8.5)
RBC # BLD AUTO: 4.19 10*6/MM3 (ref 3.9–5.2)
SODIUM BLD-SCNC: 141 MMOL/L (ref 136–145)
WBC NRBC COR # BLD: 3.82 10*3/MM3 (ref 4.5–10.7)

## 2018-11-05 PROCEDURE — 99214 OFFICE O/P EST MOD 30 MIN: CPT | Performed by: INTERNAL MEDICINE

## 2018-11-05 PROCEDURE — 85025 COMPLETE CBC W/AUTO DIFF WBC: CPT | Performed by: INTERNAL MEDICINE

## 2018-11-05 PROCEDURE — 36415 COLL VENOUS BLD VENIPUNCTURE: CPT

## 2018-11-05 PROCEDURE — 83735 ASSAY OF MAGNESIUM: CPT | Performed by: INTERNAL MEDICINE

## 2018-11-05 PROCEDURE — 96372 THER/PROPH/DIAG INJ SC/IM: CPT | Performed by: INTERNAL MEDICINE

## 2018-11-05 PROCEDURE — 84100 ASSAY OF PHOSPHORUS: CPT | Performed by: INTERNAL MEDICINE

## 2018-11-05 PROCEDURE — 80053 COMPREHEN METABOLIC PANEL: CPT | Performed by: INTERNAL MEDICINE

## 2018-11-05 PROCEDURE — 25010000002 DENOSUMAB 120 MG/1.7ML SOLUTION: Performed by: INTERNAL MEDICINE

## 2018-11-05 RX ADMIN — DENOSUMAB 120 MG: 120 INJECTION SUBCUTANEOUS at 14:41

## 2018-11-05 NOTE — PROGRESS NOTES
Subjective .     REASONS FOR FOLLOWUP:    1. Stage Ib ([id number]) right breast cancer: Diagnosed May 2010 with excisional biopsy for invasive ductal carcinoma, 1.3 cm, grade 2, ER 90%, NJ 80%, HER-2/peter negative (1+ IHC).  Subsequent right mastectomy in July 2010 with no residual breast malignancy, 1/5 sentinel lymph node with micrometastasis (0.25 mm).  Treated in the [facility] system with adjuvant AC ×4 cycles in 2010 (no taxanes administered due to underlying Charcot-Saloni-Tooth with peripheral neuropathy).  Adjuvant Femara (postmenopausal) initiated October 2010 with plan to treat ×10 years.  Genetic testing reportedly negative.  Developed osteopenia treated with Prolia beginning 2/27/13.  2. Recurrent/metastatic disease identified 10/8/17 involving thoracic spine with cord compression at T6, lumbosacral involvement, sternal and right sternoclavicular involvement.  Femara discontinued.  Radiation administered (in the [facility] system) to the thoracic spine beginning 10/19/17 treating T3-T9 to a dose of 24 gray in 6 fractions.  3. Evaluation with MRI 12/8/17 showing persistent T6 cord compression with persistent neurologic compromise requiring surgical treatment 12/11/17 with T6 laminectomy/corpectomy and T3-T9 fusion.  Pathology with metastatic carcinoma of breast origin, ER negative, NJ negative, HER-2/peter negative (1+ IHC).  4. Right pulmonary embolism 10/21/17 continuing on Lovenox 1 mg/kg (100 mg) every 12 hours.  No evidence of DVT.  Lovenox held due to right gluteus hematoma 3/23/18 through 4/6/18.  5. Cancer related pain previously on Duragesic 50 µg patch every 72 hours and Dilaudid 4 mg as needed for breakthrough pain.  Narcotics subsequently discontinued with improvement in pain in January 2018.  6. Radiation therapy to L3 dural metastasis and left humerus initiated 1/15/18 (10 fractions), completed 1/26/18  7. Monthly Xgeva initiated 1/23/18  8. Mild hypercalcemia of malignancy  9. Initiation of  palliative oral single agent Xeloda 2/7/18 (2000 mg a.m., 1500 mg p.m. for 14/21 days).  10. 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  In reviewing prior CT scans, has been present for some time.  Monitor for now, if it enlarges consider surgical excision.  11. Cumulative side effects from Xeloda with fatigue, anorexia, diarrhea, increased tearing.  Alteration in dose/schedule with cycle 11 to 1500 mg twice a day for 7 days on followed by 7 days off.    HISTORY OF PRESENT ILLNESS:  The patient is a 57 y.o. year old female who is here for follow-up with the above-mentioned history.    History of Present Illness the patient returns today in follow-up due to begin cycle 13 Xeloda and also continuing on monthly Xgeva that is due today.  She has continued to tolerate the current dosing schedule of Xgeva very well.  She has some minimal diarrhea/loose stool.  This is relieved with Imodium.  She did not experience any significant mucositis.  She is experiencing only minimal erythema in the palms and soles.  There is no skin peeling.  She notes that fatigue is minimal.  She continues to improve in terms of her mobility in rehabilitation program at [facility].  She is now ambulating with poles for assistance, occasionally without any assistance.  Lengthy distances she does use a rolling walker.  Overall she has improved dramatically in this regard.  She does have some continued arthritic pain in her shoulders which is chronic.    Past Medical History:   Diagnosis Date   • Acute pulmonary embolism, cancer-related 10/2017   • Allergic rhinitis    • Arthritis     Right knee; left shoulder   • Cancer (CMS/HCC) 05/2010    Right breast   • Cancer (CMS/HCC) 10/2017    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   • Charcot-Saloni-Tooth disease    • CPAP (continuous positive  airway pressure) dependence    • GERD (gastroesophageal reflux disease)    • H/O Colon polyps    • H/O Uterine fibroid    • Heart murmur    • Hyperlipidemia    • Hypertension    • PONV (postoperative nausea and vomiting)      Past Surgical History:   Procedure Laterality Date   • BLADDER SURGERY      Bladder lift   • BREAST RECONSTRUCTION, BREAST TISSUE EXPANDER INSERTION Right 2010   • BREAST SURGERY Right 2010    Mastectomy   •  SECTION  ,    • CHOLECYSTECTOMY     • COLONOSCOPY     • HERNIA REPAIR  2015    [facility]   • HYSTERECTOMY      Partial   • KNEE SURGERY Right    • LEG SURGERY Left     Broken   • MASTECTOMY Right 2010   • AL TREAT TIBIAL SHAFT FX, INTRAMED IMPLANT Left 2017    Procedure: TIBIA INTRAMEDULLARY NAIL/DON INSERTION;  Surgeon: Romero Shanks MD;  Location: [facility];  Service: Orthopedics   • THORACIC DECOMPRESSION POSTERIOR FUSION WITH INSTRUMENTATION N/A 2017    Procedure: T6 costotransversectomy and decompression with T3 to  T9 posterior spinal fusion with instrumentation with Jennifer Gonzalez and Nael Wilkes;  Surgeon: Quan Nayak MD;  Location: [facility];  Service:        ONCOLOGIC HISTORY:  (History from previous dates can be found in the separate document.)    Current Outpatient Prescriptions on File Prior to Visit   Medication Sig Dispense Refill   • acetaminophen (TYLENOL) 500 MG tablet Take 500 mg by mouth Every 6 (Six) Hours As Needed for Mild Pain .     • calcium carbonate (OS-ACRY) 600 MG tablet Take 600 mg by mouth 2 (Two) Times a Day With Meals.     • capecitabine (XELODA) 500 MG chemo tablet Take 3 tabs (1500 mg) po twice a day for 7 days on then 7 days off. 84 tablet 3   • cholecalciferol (VITAMIN D3) 1000 units tablet Take 1,000 Units by mouth Daily.     • cyclobenzaprine (FLEXERIL) 10 MG tablet Take 1 tablet by mouth 3 (Three) Times a Day As Needed for Muscle Spasms. 30 tablet 3   • diazePAM (VALIUM) 10 MG tablet Take 1  tablet by mouth Every 12 (Twelve) Hours As Needed for Anxiety. 10 tablet 1   • diphenoxylate-atropine (LOMOTIL) 2.5-0.025 MG per tablet Take 1 tablet by mouth 4 (Four) Times a Day As Needed for Diarrhea. 60 tablet 2   • enoxaparin (LOVENOX) 80 MG/0.8ML solution syringe INJECT 0.8 ML UNDER THE SKIN Q 12 H  3   • FLONASE ALLERGY RELIEF 50 MCG/ACT nasal spray 2 sprays into each nostril daily.  11   • gabapentin (NEURONTIN) 300 MG capsule Take 1 capsule by mouth 3 (Three) Times a Day. 90 capsule 2   • HYDROmorphone (DILAUDID) 4 MG tablet Take 1 tablet by mouth Every 4 (Four) Hours As Needed for Moderate Pain . 60 tablet 0   • mesalamine (LIALDA) 1.2 g EC tablet TAKE 1 TABLET BY MOUTH TWICE DAILY 180 tablet 0   • omeprazole (PriLOSEC) 40 MG capsule TAKE 1 CAPSULE DAILY 90 capsule 2   • ondansetron ODT (ZOFRAN-ODT) 8 MG disintegrating tablet Take 1 tablet by mouth Every 8 (Eight) Hours As Needed for Nausea or Vomiting. 30 tablet 1   • phenazopyridine (PYRIDIUM) 200 MG tablet Take 200 mg by mouth 3 (Three) Times a Day As Needed for bladder spasms.     • prochlorperazine (COMPAZINE) 10 MG tablet Take 1 tablet by mouth Every 6 (Six) Hours As Needed for Nausea or Vomiting. 30 tablet 0   • traMADol (ULTRAM) 50 MG tablet Take 1 tablet by mouth Every 8 (Eight) Hours As Needed for Moderate Pain . 90 tablet 2   • trimethoprim (TRIMPEX) 100 MG tablet Take 100 mg by mouth Daily.  2   • enoxaparin (LOVENOX) 80 MG/0.8ML solution syringe INJECT 0.8 ML UNDER THE SKIN EVERY 12 HOURS 48 mL 1   • nitrofurantoin (MACRODANTIN) 100 MG capsule Take 1 capsule by mouth 2 (Two) Times a Day. 20 capsule 0   • sennosides-docusate sodium (SENOKOT-S) 8.6-50 MG tablet Take 2 tablets by mouth 2 (Two) Times a Day. 90 tablet 2   • Tuberculin PPD (APLISOL ID) Inject  into the skin.     • Unable to find SWISH AND SPIT 5 ML PO Q 2 H PRN  0     No current facility-administered medications on file prior to visit.        ALLERGIES:     Allergies   Allergen  Reactions   • Hydrocodone Nausea Only   • Morphine And Related Nausea And Vomiting     Social History     Social History   • Marital status:      Spouse name: Murray   • Number of children: 3   • Years of education: College     Occupational History   •  [employer]   •  Retired     Social History Main Topics   • Smoking status: Never Smoker   • Smokeless tobacco: Never Used   • Alcohol use Yes      Comment: social   • Drug use: No   • Sexual activity: Defer     Other Topics Concern   • Not on file     Social History Narrative   • No narrative on file     Family History   Problem Relation Age of Onset   • Heart disease Mother    • Hyperlipidemia Mother    • Hypertension Mother    • Diabetes Father    • Charcot-Saloni-Tooth disease Father    • Heart disease Father    • Hypertension Father    • Heart failure Father    • Diabetes Sister    • Heart disease Sister    • Hypertension Sister    • Heart disease Maternal Aunt    • Scoliosis Maternal Aunt    • Heart disease Maternal Uncle    • Diabetes Maternal Grandmother    • Heart disease Maternal Grandmother    • Hypertension Maternal Grandmother    • Uterine cancer Maternal Grandmother    • Heart disease Maternal Grandfather      Review of Systems   Constitutional: Positive for fatigue. Negative for activity change, appetite change, fever and unexpected weight change.   HENT: Negative for congestion, mouth sores, nosebleeds, sore throat and voice change.    Eyes: Negative for discharge.   Respiratory: Negative for cough, shortness of breath and wheezing.    Cardiovascular: Negative for chest pain, palpitations and leg swelling.   Gastrointestinal: Positive for diarrhea. Negative for abdominal distention, abdominal pain, blood in stool, constipation, nausea and vomiting.   Endocrine: Negative for cold intolerance and heat intolerance.   Genitourinary: Negative for difficulty urinating, dysuria, frequency and hematuria.   Musculoskeletal: Positive for arthralgias.  Negative for back pain, joint swelling and myalgias.   Skin: Negative for rash and wound.   Neurological: Negative for dizziness, syncope, weakness, light-headedness, numbness and headaches.   Hematological: Negative for adenopathy. Does not bruise/bleed easily.   Psychiatric/Behavioral: Negative for confusion and sleep disturbance. The patient is not nervous/anxious.          Objective      Vitals:    11/05/18 1405   BP: 108/80   Pulse: 98   Resp: 16   Temp: 97.8 °F (36.6 °C)   SpO2: 98%      Current Status 11/5/2018   ECOG score 3   Pain 5/10    Physical Exam   Constitutional: She is oriented to person, place, and time. She appears well-developed and well-nourished.   The patient is ambulating with a rolling walker today   HENT:   Mouth/Throat: Oropharynx is clear and moist.   Eyes: Conjunctivae are normal.   Neck: No thyromegaly present.   Cardiovascular: Normal rate and regular rhythm.  Exam reveals no gallop and no friction rub.    No murmur heard.  Pulmonary/Chest: Breath sounds normal. No respiratory distress.   Abdominal: Soft. Bowel sounds are normal. She exhibits no distension. There is no tenderness.   Musculoskeletal: She exhibits edema.   Lower extremity braces in place.  Trace bilateral lower extremity edema.   Lymphadenopathy:        Head (right side): No submandibular adenopathy present.     She has no cervical adenopathy.     She has no axillary adenopathy.        Right: No inguinal and no supraclavicular adenopathy present.        Left: No inguinal and no supraclavicular adenopathy present.   Neurological: She is alert and oriented to person, place, and time. No cranial nerve deficit.   Bilateral lower extremity strength, 4+/5   Skin: Skin is warm and dry. No rash noted.   Slight brownish discoloration the palms of hands, no skin peeling   Psychiatric: She has a normal mood and affect. Her behavior is normal.       RECENT LABS:  Hematology WBC   Date Value Ref Range Status   11/05/2018 3.82 (L)  4.50 - 10.70 10*3/mm3 Final     RBC   Date Value Ref Range Status   11/05/2018 4.19 3.90 - 5.20 10*6/mm3 Final     Hemoglobin   Date Value Ref Range Status   11/05/2018 13.5 11.9 - 15.5 g/dL Final     Hematocrit   Date Value Ref Range Status   11/05/2018 40.7 35.6 - 45.5 % Final     Platelets   Date Value Ref Range Status   11/05/2018 298 140 - 500 10*3/mm3 Final        Lab Results   Component Value Date    GLUCOSE 122 (H) 11/05/2018    BUN 15 11/05/2018    CREATININE 0.95 11/05/2018    EGFRIFNONA 61 11/05/2018    EGFRIFAFRI 80 09/25/2017    BCR 15.8 11/05/2018    K 4.7 11/05/2018    CO2 28.6 11/05/2018    CALCIUM 9.8 11/05/2018    PROTENTOTREF 6.4 09/25/2017    ALBUMIN 4.20 11/05/2018    LABIL2 2.2 09/25/2017    AST 18 11/05/2018    ALT 17 11/05/2018     CT chest abdomen pelvis 10/31/18: I did personally review CT images  IMPRESSION:  Postoperative changes as described. There is a solitary  small cluster of noncalcified nodules in the anterior aspect of the  right lower lobe that remains stable. Osseous metastatic disease is also  unchanged as described in more detail above. No new abnormalities are  identified.    Bone scan 10/31/18:  IMPRESSION:  No change compared to previous bone scan 08/13/2018. No new  abnormal uptake.    Assessment/Plan      1. Previous Stage Ib ([id number]) right breast cancer:  · Diagnosed May 2010 with excisional biopsy for invasive ductal carcinoma, 1.3 cm, grade 2, ER 90%, NH 80%, HER-2/peter negative (1+ IHC).    · Subsequent right mastectomy in July 2010 with no residual breast malignancy, 1/5 sentinel lymph node with micrometastasis (0.25 mm).    · Treated in the [facility] system with adjuvant AC ×4 cycles in 2010 (no taxanes administered due to underlying Charcot-Saloni-Tooth with peripheral neuropathy).    · Adjuvant Femara (postmenopausal) initiated October 2010 with plan to treat ×10 years.    · Genetic testing reportedly negative.    · Developed osteopenia treated with Prolia  beginning 2/27/13. Subsequently discontinued due to identification of metastatic disease.  2. Recurrent/metastatic disease identified 10/8/17:  · Disease involving thoracic spine with cord compression at T6, lumbosacral involvement, sternal and right sternoclavicular involvement.    · Femara discontinued in 10/2017.    · Radiation administered (in the [facility] system) to the thoracic spine beginning 10/19/17 treating T3-T9 to a dose of 24 gray in 6 fractions.  · Evaluation with MRI 12/8/17 showing persistent T6 cord compression with persistent neurologic compromise requiring surgical treatment 12/11/17 with T6 laminectomy/corpectomy and T3-T9 fusion.  Pathology with metastatic carcinoma of breast origin, ER negative, AL negative, HER-2/peter negative (1+ IHC).  · Additional staging evaluation 12/8/17 with no evidence of visceral metastatic disease, bone scan showing involvement of thoracic spine, sternum, left humerus, mid frontal bone.  No plane film correlate of left humerus lesion.  MRI lumbar spine with small intradural L3 metastasis.  CA 15-3 12/6/17- 17.  · Palliative radiation therapy to L3 dural metastasis and left humerus initiated 1/15/18 (10 fractions), completed 1/26/18.  · Hypercalcemia of malignancy with calcium in the 10-11 range.  · Initiation of monthly Xgeva 1/23/18.  · Baseline CT scan 1/30/18 with no evidence of visceral involvement.  Cluster of nodular opacities in the right lower lobe suspected to be infectious or related to bronchiolitis. Bone scan 1/30/18 showed postsurgical change in the thoracic spine, stable uptake in the frontal bone, no new areas of disease.  · Initiation of palliative oral single agent Xeloda 2/7/18 2000 mg a.m., 1500 mg p.m. for 14/21 days.   · Following 3 cycles xeloda, bone scan 4/4/18 showed no change from the prior study.  CT scan 4/4/18 showed a small pericardial effusion of unclear significance as well as a subcutaneous nodule in the right lateral chest wall.   Subsequent evaluation with echocardiogram 4/17/18 showed no evidence of pericardial effusion.  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  We discussed the possibility of surgical excision of the right subcutaneous chest wall lesion for more definitive diagnosis.  Reviewed previous CT images dating back to 12/8/17 and the lesion was present even at that time measuring around 1.7 cm although not commented on in the radiology report.  As this appears to be an indolent low-grade process unrelated to her breast cancer, recommendee foregoing surgical excision at this time and monitoring this area on future scans.  The patient agreed.    · Following 6 cycles of Xeloda, CT 6/6/18  showed stable findings, no evidence of progressive disease.  There was a comment regarding subcutaneous abnormality in the anterior abdominal wall and this was related to Lovenox injection sites.  Bone scan 6/6/18 showed no interval change.   · CT scan and bone scan 8/13/18 following 9 cycles of Xeloda showed stable findings with no evidence of significant visceral metastases.  Her bone lesions appear stable on bone scan.  The spindle cell neoplasm in her right chest wall actually decreased in size from 2 cm down to 1.6 cm.    · The patient experienced some symptoms of diarrhea, anorexia, generalized weakness during cycle 9 Xeloda it was unclear whether this was related to a viral gastroenteritis or toxicity from treatment.  Symptoms recurred during cycle 10 and treatment was cut short by 2 days.  Symptoms attributed to Xeloda.  With cycle 11, dose and schedule altered to 1500 mg twice daily for 7 days on followed by 7 days off .  · The patient returns today due to begin cycle 13 Xeloda and for monthly Xgeva.  We did review CT scan and bone scan from 10/31/18 following 12 cycles of Xeloda.  Scans show  stable disease.  She is tolerating treatment extremely well with minimal diarrhea and fatigue.  She does have slightly more leukopenia today however is acceptable to proceed on with treatment.  We will anticipate repeat scans after 2-3 cycles pending her clinical status and tolerance.  She will return in 1 month when she is due for cycle 14 and monthly Xgeva.  Right chest wall spindle cell neoplasm is stable on the current scan even though not mentioned on the report.  I did perform comparative measurements.  3. Right hip/gluteal hematoma:  · The patient had developed considerable pain in the right hip and underwent MRI 3/22/18 visit showed a 5.7 x 4.3 x 11 cm hematoma in the right gluteal region.  · Lovenox was held 3/23 through 4/6/18.  Resolution of pain, Lovenox was resumed. Hematoma likely occurred due to minimal trauma from transfer out of her wheelchair.   4. Right pulmonary embolism:  · Diagnosed on CT angiogram 10/21/17 involving small right lower lobe pulmonary artery.  Lower extremity Dopplers negative.  · Bilateral lower extremity Dopplers negative again 12/5/17.  · Continuing on Lovenox 1 mg/kg (80 mg) subcutaneous injection every 12 hours.  Lovenox was held for 2 weeks due to right hip hematoma seen on MRI, treatment resumed on 4/6/18 with no further obvious bleeding issues.  5. Cancer related pain:  · Previously receiving Duragesic 50 µg patch every 72 hours along with Dilaudid 4 mg as needed for breakthrough pain  · The patient's pain improved over time and she was able to discontinue both Duragesic and Dilaudid in the interval.  · Patient does take occasional Flexeril at bedtime due to back spasm/pain when she has been more active.  · Right hip pain as noted above due to right gluteal hematoma, previously prescribed Dilaudid 4 mg every 4 hours as needed #60 with no refill.  Pain from hematoma resolved.  · The patient does have some occasional aggravation of her chronic back pain with significant  rehabilitation activity and requires an occasional dose of Dilaudid.  Pain currently improved.  6. Chemotherapy-induced diarrhea:  · This did recur and the patient has Imodium and Lomotil to use as needed.  Symptoms currently resolved.  She did see GI and there was no recommendation to pursue any additional testing apart from stool studies which are pending.  She is on a probiotic as well.  · Symptoms improved on reduced dose Xeloda  7. Traumatic left tibia/fibular fracture:  · Status post ORIF 12/6/17  · Specimen was sent for pathologic review, negative for evidence of malignancy  8. Hypercalcemia:  · Suspect hypercalcemia of malignancy, calcium in  10-11 range previously.  · Calcium normalized following initiation of monthly Xgeva on 1/23/18.  9. Chemotherapy-induced mucositis:  · Patient had a minimal degree of mucositis with cycle 2.  The patient has magic mouthwash to use as needed.  No subsequent mucositis.  10. Recurrent UTI, bladder wall thickening on CT:  · Patient had an enterococcal UTI on 3/2/18 sensitive to nitrofurantoin and received treatment, unclear how long.  · Recurrent UTI 3/20/18 with urine culture growing Klebsiella, initially treated with nitrofurantoin, transitioned to Levaquin.  · CT 4/4/18 with diffuse bladder wall thickening with increased nodular thickening at the left base.  Referral to urogynecology Dr. May Johnson.  She was placed on a prophylactic dose of trimethoprim 100 mg daily, bladder wall thickening felt to be related to recent recurrent urinary tract infections.   · Enterococcus faecalis greater than 100,000 colonies was treated for 10 days with nitrofurantoin with resolution of symptoms.  11.   Mobility:  · The patient continues with aggressive outpatient physical therapy at [facility].   · She continues to improve considerably, now using bilateral poles and rolling walker intermittently.  She does have 3 additional weeks remaining in her rehabilitation course at [facility].  12.   Depression:  · The patient weaned herself off of Cymbalta and reports that her symptoms remain stable.  13. Hand-foot syndrome secondary to Xeloda:  · The patient has mild palmar and plantar erythema, no current skin peeling.  She continues to use Eucerin cream multiple times throughout the day.  14. Elevated liver function studies:  · Liver function studies increased 8/20/19 with ALT 98, AST 70, normal total bilirubin.  · Negative viral hepatitis A, B, and C panel 8/23/18  · Likely related to hepatic steatosis seen on CT, no definitive evidence of metastatic liver lesions.   · Gradual improvement of LFTs, currently normal today.    Plan:  1. Begin cycle 13 Xeloda continuing with 1500 mg twice a day 7 days on followed by 7 days off  2. Monthly Xgeva today  3. Continue Lovenox 80 mg every 12 hours.  4. Continue outpatient physical therapy at [facility] (3 weeks remaining)  5. In 4 weeks M.D. visit CBC, CMP, magnesium, phosphorus.  The patient will be due for Xgeva.  She will also be due to begin cycle 14 Xeloda.

## 2018-11-07 ENCOUNTER — OFFICE (AMBULATORY)
Dept: URBAN - METROPOLITAN AREA CLINIC 75 | Facility: CLINIC | Age: 58
End: 2018-11-07

## 2018-11-07 VITALS
DIASTOLIC BLOOD PRESSURE: 80 MMHG | HEART RATE: 85 BPM | SYSTOLIC BLOOD PRESSURE: 122 MMHG | HEIGHT: 61 IN | WEIGHT: 193 LBS

## 2018-11-07 DIAGNOSIS — K52.9 NONINFECTIVE GASTROENTERITIS AND COLITIS, UNSPECIFIED: ICD-10-CM

## 2018-11-07 DIAGNOSIS — K21.9 GASTRO-ESOPHAGEAL REFLUX DISEASE WITHOUT ESOPHAGITIS: ICD-10-CM

## 2018-11-07 PROCEDURE — 99214 OFFICE O/P EST MOD 30 MIN: CPT

## 2018-11-13 ENCOUNTER — TELEPHONE (OUTPATIENT)
Dept: INTERNAL MEDICINE | Facility: CLINIC | Age: 58
End: 2018-11-13

## 2018-12-03 ENCOUNTER — LAB (OUTPATIENT)
Dept: OTHER | Facility: HOSPITAL | Age: 58
End: 2018-12-03

## 2018-12-03 ENCOUNTER — OFFICE VISIT (OUTPATIENT)
Dept: ONCOLOGY | Facility: CLINIC | Age: 58
End: 2018-12-03

## 2018-12-03 ENCOUNTER — INFUSION (OUTPATIENT)
Dept: ONCOLOGY | Facility: HOSPITAL | Age: 58
End: 2018-12-03

## 2018-12-03 VITALS
BODY MASS INDEX: 36.84 KG/M2 | HEIGHT: 61 IN | DIASTOLIC BLOOD PRESSURE: 86 MMHG | HEART RATE: 89 BPM | OXYGEN SATURATION: 96 % | WEIGHT: 195.1 LBS | SYSTOLIC BLOOD PRESSURE: 133 MMHG | RESPIRATION RATE: 16 BRPM | TEMPERATURE: 97.8 F

## 2018-12-03 DIAGNOSIS — Z17.1 MALIGNANT NEOPLASM OF OVERLAPPING SITES OF RIGHT BREAST IN FEMALE, ESTROGEN RECEPTOR NEGATIVE (HCC): Primary | ICD-10-CM

## 2018-12-03 DIAGNOSIS — C50.811 MALIGNANT NEOPLASM OF OVERLAPPING SITES OF RIGHT BREAST IN FEMALE, ESTROGEN RECEPTOR NEGATIVE (HCC): Primary | ICD-10-CM

## 2018-12-03 DIAGNOSIS — C50.811 MALIGNANT NEOPLASM OF OVERLAPPING SITES OF RIGHT BREAST IN FEMALE, ESTROGEN RECEPTOR NEGATIVE (HCC): ICD-10-CM

## 2018-12-03 DIAGNOSIS — Z17.1 MALIGNANT NEOPLASM OF OVERLAPPING SITES OF RIGHT BREAST IN FEMALE, ESTROGEN RECEPTOR NEGATIVE (HCC): ICD-10-CM

## 2018-12-03 LAB
ALBUMIN SERPL-MCNC: 4.1 G/DL (ref 3.5–5.2)
ALBUMIN/GLOB SERPL: 1.5 G/DL
ALP SERPL-CCNC: 66 U/L (ref 39–117)
ALT SERPL W P-5'-P-CCNC: 22 U/L (ref 1–33)
ANION GAP SERPL CALCULATED.3IONS-SCNC: 13 MMOL/L
AST SERPL-CCNC: 20 U/L (ref 1–32)
BASOPHILS # BLD AUTO: 0.04 10*3/MM3 (ref 0–0.2)
BASOPHILS NFR BLD AUTO: 1 % (ref 0–1.5)
BILIRUB SERPL-MCNC: 0.3 MG/DL (ref 0.1–1.2)
BUN BLD-MCNC: 20 MG/DL (ref 6–20)
BUN/CREAT SERPL: 22.7 (ref 7–25)
CALCIUM SPEC-SCNC: 9.5 MG/DL (ref 8.6–10.5)
CHLORIDE SERPL-SCNC: 105 MMOL/L (ref 98–107)
CO2 SERPL-SCNC: 24 MMOL/L (ref 22–29)
CREAT BLD-MCNC: 0.88 MG/DL (ref 0.57–1)
DEPRECATED RDW RBC AUTO: 57.4 FL (ref 37–54)
EOSINOPHIL # BLD AUTO: 0.08 10*3/MM3 (ref 0–0.7)
EOSINOPHIL NFR BLD AUTO: 2 % (ref 0.3–6.2)
ERYTHROCYTE [DISTWIDTH] IN BLOOD BY AUTOMATED COUNT: 16.9 % (ref 11.7–13)
GFR SERPL CREATININE-BSD FRML MDRD: 66 ML/MIN/1.73
GLOBULIN UR ELPH-MCNC: 2.8 GM/DL
GLUCOSE BLD-MCNC: 111 MG/DL (ref 65–99)
HCT VFR BLD AUTO: 40.1 % (ref 35.6–45.5)
HGB BLD-MCNC: 13.4 G/DL (ref 11.9–15.5)
IMM GRANULOCYTES # BLD: 0.01 10*3/MM3 (ref 0–0.03)
IMM GRANULOCYTES NFR BLD: 0.3 % (ref 0–0.5)
LYMPHOCYTES # BLD AUTO: 1.23 10*3/MM3 (ref 0.9–4.8)
LYMPHOCYTES NFR BLD AUTO: 31.1 % (ref 19.6–45.3)
MAGNESIUM SERPL-MCNC: 1.9 MG/DL (ref 1.6–2.6)
MCH RBC QN AUTO: 31.8 PG (ref 26.9–32)
MCHC RBC AUTO-ENTMCNC: 33.4 G/DL (ref 32.4–36.3)
MCV RBC AUTO: 95.2 FL (ref 80.5–98.2)
MONOCYTES # BLD AUTO: 0.44 10*3/MM3 (ref 0.2–1.2)
MONOCYTES NFR BLD AUTO: 11.1 % (ref 5–12)
NEUTROPHILS # BLD AUTO: 2.16 10*3/MM3 (ref 1.9–8.1)
NEUTROPHILS NFR BLD AUTO: 54.5 % (ref 42.7–76)
NRBC BLD MANUAL-RTO: 0 /100 WBC (ref 0–0)
PHOSPHATE SERPL-MCNC: 3.5 MG/DL (ref 2.5–4.5)
PLATELET # BLD AUTO: 258 10*3/MM3 (ref 140–500)
PMV BLD AUTO: 11.6 FL (ref 6–12)
POTASSIUM BLD-SCNC: 4.7 MMOL/L (ref 3.5–5.2)
PROT SERPL-MCNC: 6.9 G/DL (ref 6–8.5)
RBC # BLD AUTO: 4.21 10*6/MM3 (ref 3.9–5.2)
SODIUM BLD-SCNC: 142 MMOL/L (ref 136–145)
WBC NRBC COR # BLD: 3.96 10*3/MM3 (ref 4.5–10.7)

## 2018-12-03 PROCEDURE — 96372 THER/PROPH/DIAG INJ SC/IM: CPT | Performed by: INTERNAL MEDICINE

## 2018-12-03 PROCEDURE — 84100 ASSAY OF PHOSPHORUS: CPT | Performed by: INTERNAL MEDICINE

## 2018-12-03 PROCEDURE — 99214 OFFICE O/P EST MOD 30 MIN: CPT | Performed by: INTERNAL MEDICINE

## 2018-12-03 PROCEDURE — 25010000002 DENOSUMAB 120 MG/1.7ML SOLUTION: Performed by: INTERNAL MEDICINE

## 2018-12-03 PROCEDURE — 85025 COMPLETE CBC W/AUTO DIFF WBC: CPT | Performed by: INTERNAL MEDICINE

## 2018-12-03 PROCEDURE — 83735 ASSAY OF MAGNESIUM: CPT | Performed by: INTERNAL MEDICINE

## 2018-12-03 PROCEDURE — 80053 COMPREHEN METABOLIC PANEL: CPT | Performed by: INTERNAL MEDICINE

## 2018-12-03 PROCEDURE — 36415 COLL VENOUS BLD VENIPUNCTURE: CPT

## 2018-12-03 RX ADMIN — DENOSUMAB 120 MG: 120 INJECTION SUBCUTANEOUS at 15:27

## 2018-12-04 NOTE — PROGRESS NOTES
Subjective .     REASONS FOR FOLLOWUP:    1. Stage Ib ([id number]) right breast cancer: Diagnosed May 2010 with excisional biopsy for invasive ductal carcinoma, 1.3 cm, grade 2, ER 90%, AL 80%, HER-2/peter negative (1+ IHC).  Subsequent right mastectomy in July 2010 with no residual breast malignancy, 1/5 sentinel lymph node with micrometastasis (0.25 mm).  Treated in the [facility] system with adjuvant AC ×4 cycles in 2010 (no taxanes administered due to underlying Charcot-Saloni-Tooth with peripheral neuropathy).  Adjuvant Femara (postmenopausal) initiated October 2010 with plan to treat ×10 years.  Genetic testing reportedly negative.  Developed osteopenia treated with Prolia beginning 2/27/13.  2. Recurrent/metastatic disease identified 10/8/17 involving thoracic spine with cord compression at T6, lumbosacral involvement, sternal and right sternoclavicular involvement.  Femara discontinued.  Radiation administered (in the [facility] system) to the thoracic spine beginning 10/19/17 treating T3-T9 to a dose of 24 gray in 6 fractions.  3. Evaluation with MRI 12/8/17 showing persistent T6 cord compression with persistent neurologic compromise requiring surgical treatment 12/11/17 with T6 laminectomy/corpectomy and T3-T9 fusion.  Pathology with metastatic carcinoma of breast origin, ER negative, AL negative, HER-2/peter negative (1+ IHC).  4. Right pulmonary embolism 10/21/17 continuing on Lovenox 1 mg/kg (100 mg) every 12 hours.  No evidence of DVT.  Lovenox held due to right gluteus hematoma 3/23/18 through 4/6/18.  5. Cancer related pain previously on Duragesic 50 µg patch every 72 hours and Dilaudid 4 mg as needed for breakthrough pain.  Narcotics subsequently discontinued with improvement in pain in January 2018.  6. Radiation therapy to L3 dural metastasis and left humerus initiated 1/15/18 (10 fractions), completed 1/26/18  7. Monthly Xgeva initiated 1/23/18  8. Mild hypercalcemia of malignancy  9. Initiation of  palliative oral single agent Xeloda 2/7/18 (2000 mg a.m., 1500 mg p.m. for 14/21 days).  10. 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  In reviewing prior CT scans, has been present for some time.  Monitor for now, if it enlarges consider surgical excision.  11. Cumulative side effects from Xeloda with fatigue, anorexia, diarrhea, increased tearing.  Alteration in dose/schedule with cycle 11 to 1500 mg twice a day for 7 days on followed by 7 days off.    HISTORY OF PRESENT ILLNESS:  The patient is a 58 y.o. year old female who is here for follow-up with the above-mentioned history.    History of Present Illness the patient returns today in follow-up due to begin cycle 14 Xeloda and also continuing on monthly Xgeva that is due today.  She is continuing to tolerate Xeloda very well at this dose.  She has had mild hand-foot symptoms with minimal pealing.  She is using Eucerin cream frequently.  She is not however wearing socks and gloves at night.  He denies any mucositis.  She has had some very occasional loose stool recently.  She does note that in the interval since her last visit she was diagnosed with C. difficile colitis by her GI physician and completed a course of oral vancomycin with resolution of her diarrhea.  She reports completing this around 3-4 weeks ago.  She did have a C. difficile rechecked recently which was negative.  She has now graduated from course of rehabilitation at [facility].  She is ambulating with the use of bilateral canes/balancing sticks.  Able appetite.  She is becoming more independent and has been able to get out on her own a few times recently.    Past Medical History:   Diagnosis Date   • Acute pulmonary embolism, cancer-related 10/2017   • Allergic rhinitis    • Arthritis     Right knee; left shoulder   • Cancer  (CMS/LTAC, located within [facility]) 2010    Right breast   • Cancer (CMS/LTAC, located within [facility]) 10/2017    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   • Charcot-Saloni-Tooth disease    • CPAP (continuous positive airway pressure) dependence    • GERD (gastroesophageal reflux disease)    • H/O Colon polyps    • H/O Uterine fibroid    • Heart murmur    • Hyperlipidemia    • Hypertension    • PONV (postoperative nausea and vomiting)      Past Surgical History:   Procedure Laterality Date   • BLADDER SURGERY      Bladder lift   • BREAST RECONSTRUCTION, BREAST TISSUE EXPANDER INSERTION Right 2010   • BREAST SURGERY Right 2010    Mastectomy   •  SECTION  ,    • CHOLECYSTECTOMY     • COLONOSCOPY     • HERNIA REPAIR  2015    [facility]   • HYSTERECTOMY      Partial   • KNEE SURGERY Right    • LEG SURGERY Left     Broken   • MASTECTOMY Right 2010       ONCOLOGIC HISTORY:  (History from previous dates can be found in the separate document.)    Current Outpatient Medications on File Prior to Visit   Medication Sig Dispense Refill   • acetaminophen (TYLENOL) 500 MG tablet Take 500 mg by mouth Every 6 (Six) Hours As Needed for Mild Pain .     • calcium carbonate (OS-CARY) 600 MG tablet Take 600 mg by mouth 2 (Two) Times a Day With Meals.     • capecitabine (XELODA) 500 MG chemo tablet Take 3 tabs (1500 mg) po twice a day for 7 days on then 7 days off. 84 tablet 3   • cholecalciferol (VITAMIN D3) 1000 units tablet Take 1,000 Units by mouth Daily.     • cyclobenzaprine (FLEXERIL) 10 MG tablet Take 1 tablet by mouth 3 (Three) Times a Day As Needed for Muscle Spasms. 30 tablet 3   • diazePAM (VALIUM) 10 MG tablet Take 1 tablet by mouth Every 12 (Twelve) Hours As Needed for Anxiety. 10 tablet 1   • diphenoxylate-atropine (LOMOTIL) 2.5-0.025 MG per tablet Take 1 tablet by mouth 4 (Four) Times a Day As Needed for Diarrhea. 60 tablet 2   • enoxaparin (LOVENOX) 80 MG/0.8ML solution syringe INJECT 0.8 ML UNDER THE SKIN Q 12 H  3   • enoxaparin (LOVENOX) 80  MG/0.8ML solution syringe INJECT 0.8 ML UNDER THE SKIN EVERY 12 HOURS 48 mL 1   • FLONASE ALLERGY RELIEF 50 MCG/ACT nasal spray 2 sprays into each nostril daily.  11   • gabapentin (NEURONTIN) 300 MG capsule Take 1 capsule by mouth 3 (Three) Times a Day. 90 capsule 2   • HYDROmorphone (DILAUDID) 4 MG tablet Take 1 tablet by mouth Every 4 (Four) Hours As Needed for Moderate Pain . 60 tablet 0   • mesalamine (LIALDA) 1.2 g EC tablet TAKE 1 TABLET BY MOUTH TWICE DAILY 180 tablet 0   • nitrofurantoin (MACRODANTIN) 100 MG capsule Take 1 capsule by mouth 2 (Two) Times a Day. 20 capsule 0   • omeprazole (PriLOSEC) 40 MG capsule TAKE 1 CAPSULE DAILY 90 capsule 2   • ondansetron ODT (ZOFRAN-ODT) 8 MG disintegrating tablet Take 1 tablet by mouth Every 8 (Eight) Hours As Needed for Nausea or Vomiting. 30 tablet 1   • phenazopyridine (PYRIDIUM) 200 MG tablet Take 200 mg by mouth 3 (Three) Times a Day As Needed for bladder spasms.     • prochlorperazine (COMPAZINE) 10 MG tablet Take 1 tablet by mouth Every 6 (Six) Hours As Needed for Nausea or Vomiting. 30 tablet 0   • sennosides-docusate sodium (SENOKOT-S) 8.6-50 MG tablet Take 2 tablets by mouth 2 (Two) Times a Day. 90 tablet 2   • traMADol (ULTRAM) 50 MG tablet Take 1 tablet by mouth Every 8 (Eight) Hours As Needed for Moderate Pain . 90 tablet 2   • trimethoprim (TRIMPEX) 100 MG tablet Take 100 mg by mouth Daily.  2   • Tuberculin PPD (APLISOL ID) Inject  into the skin.     • Unable to find SWISH AND SPIT 5 ML PO Q 2 H PRN  0     Current Facility-Administered Medications on File Prior to Visit   Medication Dose Route Frequency Provider Last Rate Last Dose   • [COMPLETED] denosumab (XGEVA) injection 120 mg  120 mg Subcutaneous Once Ubaldo Hancock Jr., MD   120 mg at 12/03/18 8273       ALLERGIES:     Allergies   Allergen Reactions   • Hydrocodone Nausea Only   • Morphine And Related Nausea And Vomiting     Social History     Socioeconomic History   • Marital status:       Spouse name: Murray   • Number of children: 3   • Years of education: College   • Highest education level: Not on file   Social Needs   • Financial resource strain: Not on file   • Food insecurity - worry: Not on file   • Food insecurity - inability: Not on file   • Transportation needs - medical: Not on file   • Transportation needs - non-medical: Not on file   Occupational History     Employer: [employer]     Employer: RETIRED   Tobacco Use   • Smoking status: Never Smoker   • Smokeless tobacco: Never Used   Substance and Sexual Activity   • Alcohol use: Yes     Comment: social   • Drug use: No   • Sexual activity: Defer   Other Topics Concern   • Not on file   Social History Narrative   • Not on file     Family History   Problem Relation Age of Onset   • Heart disease Mother    • Hyperlipidemia Mother    • Hypertension Mother    • Diabetes Father    • Charcot-Saloni-Tooth disease Father    • Heart disease Father    • Hypertension Father    • Heart failure Father    • Diabetes Sister    • Heart disease Sister    • Hypertension Sister    • Heart disease Maternal Aunt    • Scoliosis Maternal Aunt    • Heart disease Maternal Uncle    • Diabetes Maternal Grandmother    • Heart disease Maternal Grandmother    • Hypertension Maternal Grandmother    • Uterine cancer Maternal Grandmother    • Heart disease Maternal Grandfather      Review of Systems   Constitutional: Positive for fatigue. Negative for activity change, appetite change, fever and unexpected weight change.   HENT: Negative for congestion, mouth sores, nosebleeds, sore throat and voice change.    Eyes: Negative for discharge.   Respiratory: Negative for cough, shortness of breath and wheezing.    Cardiovascular: Negative for chest pain, palpitations and leg swelling.   Gastrointestinal: Positive for diarrhea. Negative for abdominal distention, abdominal pain, blood in stool, constipation, nausea and vomiting.   Endocrine: Negative for cold intolerance and  heat intolerance.   Genitourinary: Negative for difficulty urinating, dysuria, frequency and hematuria.   Musculoskeletal: Positive for arthralgias. Negative for back pain, joint swelling and myalgias.   Skin: Positive for rash. Negative for wound.   Neurological: Negative for dizziness, syncope, weakness, light-headedness, numbness and headaches.   Hematological: Negative for adenopathy. Does not bruise/bleed easily.   Psychiatric/Behavioral: Negative for confusion and sleep disturbance. The patient is not nervous/anxious.          Objective      Vitals:    12/03/18 1350   BP: 133/86   Pulse: 89   Resp: 16   Temp: 97.8 °F (36.6 °C)   SpO2: 96%      Current Status 12/3/2018   ECOG score 0   Pain 0/10    Physical Exam   Constitutional: She is oriented to person, place, and time. She appears well-developed and well-nourished.   The patient is ambulating without a walker today   HENT:   Mouth/Throat: Oropharynx is clear and moist.   Eyes: Conjunctivae are normal.   Neck: No thyromegaly present.   Cardiovascular: Normal rate and regular rhythm. Exam reveals no gallop and no friction rub.   No murmur heard.  Pulmonary/Chest: Breath sounds normal. No respiratory distress.   Abdominal: Soft. Bowel sounds are normal. She exhibits no distension. There is no tenderness.   Musculoskeletal: She exhibits edema.   Lower extremity braces in place.  Trace bilateral lower extremity edema.   Lymphadenopathy:        Head (right side): No submandibular adenopathy present.     She has no cervical adenopathy.     She has no axillary adenopathy.        Right: No inguinal and no supraclavicular adenopathy present.        Left: No inguinal and no supraclavicular adenopathy present.   Neurological: She is alert and oriented to person, place, and time. No cranial nerve deficit.   Bilateral lower extremity strength, 4+/5   Skin: Skin is warm and dry. No rash noted.   Slight brownish discoloration the palms of hands, no skin peeling    Psychiatric: She has a normal mood and affect. Her behavior is normal.       RECENT LABS:  Hematology WBC   Date Value Ref Range Status   12/03/2018 3.96 (L) 4.50 - 10.70 10*3/mm3 Final     RBC   Date Value Ref Range Status   12/03/2018 4.21 3.90 - 5.20 10*6/mm3 Final     Hemoglobin   Date Value Ref Range Status   12/03/2018 13.4 11.9 - 15.5 g/dL Final     Hematocrit   Date Value Ref Range Status   12/03/2018 40.1 35.6 - 45.5 % Final     Platelets   Date Value Ref Range Status   12/03/2018 258 140 - 500 10*3/mm3 Final        Lab Results   Component Value Date    GLUCOSE 111 (H) 12/03/2018    BUN 20 12/03/2018    CREATININE 0.88 12/03/2018    EGFRIFNONA 66 12/03/2018    EGFRIFAFRI 80 09/25/2017    BCR 22.7 12/03/2018    K 4.7 12/03/2018    CO2 24.0 12/03/2018    CALCIUM 9.5 12/03/2018    PROTENTOTREF 6.4 09/25/2017    ALBUMIN 4.10 12/03/2018    LABIL2 2.2 09/25/2017    AST 20 12/03/2018    ALT 22 12/03/2018         Assessment/Plan      1. Previous Stage Ib ([id number]) right breast cancer:  · Diagnosed May 2010 with excisional biopsy for invasive ductal carcinoma, 1.3 cm, grade 2, ER 90%, AL 80%, HER-2/peter negative (1+ IHC).    · Subsequent right mastectomy in July 2010 with no residual breast malignancy, 1/5 sentinel lymph node with micrometastasis (0.25 mm).    · Treated in the [place] system with adjuvant AC ×4 cycles in 2010 (no taxanes administered due to underlying Charcot-Saloni-Tooth with peripheral neuropathy).    · Adjuvant Femara (postmenopausal) initiated October 2010 with plan to treat ×10 years.    · Genetic testing reportedly negative.    · Developed osteopenia treated with Prolia beginning 2/27/13. Subsequently discontinued due to identification of metastatic disease.  2. Recurrent/metastatic disease identified 10/8/17:  · Disease involving thoracic spine with cord compression at T6, lumbosacral involvement, sternal and right sternoclavicular involvement.    · Femara discontinued in 10/2017.     · Radiation administered (in the [facility] system) to the thoracic spine beginning 10/19/17 treating T3-T9 to a dose of 24 gray in 6 fractions.  · Evaluation with MRI 12/8/17 showing persistent T6 cord compression with persistent neurologic compromise requiring surgical treatment 12/11/17 with T6 laminectomy/corpectomy and T3-T9 fusion.  Pathology with metastatic carcinoma of breast origin, ER negative, AK negative, HER-2/peter negative (1+ IHC).  · Additional staging evaluation 12/8/17 with no evidence of visceral metastatic disease, bone scan showing involvement of thoracic spine, sternum, left humerus, mid frontal bone.  No plane film correlate of left humerus lesion.  MRI lumbar spine with small intradural L3 metastasis.  CA 15-3 12/6/17- 17.  · Palliative radiation therapy to L3 dural metastasis and left humerus initiated 1/15/18 (10 fractions), completed 1/26/18.  · Hypercalcemia of malignancy with calcium in the 10-11 range.  · Initiation of monthly Xgeva 1/23/18.  · Baseline CT scan 1/30/18 with no evidence of visceral involvement.  Cluster of nodular opacities in the right lower lobe suspected to be infectious or related to bronchiolitis. Bone scan 1/30/18 showed postsurgical change in the thoracic spine, stable uptake in the frontal bone, no new areas of disease.  · Initiation of palliative oral single agent Xeloda 2/7/18 2000 mg a.m., 1500 mg p.m. for 14/21 days.   · Following 3 cycles xeloda, bone scan 4/4/18 showed no change from the prior study.  CT scan 4/4/18 showed a small pericardial effusion of unclear significance as well as a subcutaneous nodule in the right lateral chest wall.  Subsequent evaluation with echocardiogram 4/17/18 showed no evidence of pericardial effusion.  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  We discussed the possibility of surgical  excision of the right subcutaneous chest wall lesion for more definitive diagnosis.  Reviewed previous CT images dating back to 12/8/17 and the lesion was present even at that time measuring around 1.7 cm although not commented on in the radiology report.  As this appears to be an indolent low-grade process unrelated to her breast cancer, recommendee foregoing surgical excision at this time and monitoring this area on future scans.  The patient agreed.    · Following 6 cycles of Xeloda, CT 6/6/18  showed stable findings, no evidence of progressive disease.  There was a comment regarding subcutaneous abnormality in the anterior abdominal wall and this was related to Lovenox injection sites.  Bone scan 6/6/18 showed no interval change.   · CT scan and bone scan 8/13/18 following 9 cycles of Xeloda showed stable findings with no evidence of significant visceral metastases.  Her bone lesions appear stable on bone scan.  The spindle cell neoplasm in her right chest wall actually decreased in size from 2 cm down to 1.6 cm.    · The patient experienced some symptoms of diarrhea, anorexia, generalized weakness during cycle 9 Xeloda it was unclear whether this was related to a viral gastroenteritis or toxicity from treatment.  Symptoms recurred during cycle 10 and treatment was cut short by 2 days.  Symptoms attributed to Xeloda.  With cycle 11, dose and schedule altered to 1500 mg twice daily for 7 days on followed by 7 days off .  · CT scan and bone scan from 10/31/18 following 12 cycles of Xeloda showed stable disease.    · Patient returns today in follow-up due to begin her 14th cycle Xeloda along with monthly Xgeva.  She continues to tolerate treatment reasonably well with mild grade 1/2 hand-foot syndrome which is unchanged.  She has had some intermittent diarrhea however we're now aware that she has had episode of C. difficile colitis in the past month which was treated by her GI physician with oral vancomycin and she  is now C. difficile negative.  She will continue on with treatment as planned and will return in 1 month for a nurse practitioner visit.  I will see her back in 2 months when she is due for cycle 16 and we will review CT and bone scan.  3. Right hip/gluteal hematoma:  · The patient had developed considerable pain in the right hip and underwent MRI 3/22/18 visit showed a 5.7 x 4.3 x 11 cm hematoma in the right gluteal region.  · Lovenox was held 3/23 through 4/6/18.  Resolution of pain, Lovenox was resumed. Hematoma likely occurred due to minimal trauma from transfer out of her wheelchair.   4. Right pulmonary embolism:  · Diagnosed on CT angiogram 10/21/17 involving small right lower lobe pulmonary artery.  Lower extremity Dopplers negative.  · Bilateral lower extremity Dopplers negative again 12/5/17.  · Continuing on Lovenox 1 mg/kg (80 mg) subcutaneous injection every 12 hours.  Lovenox was held for 2 weeks due to right hip hematoma seen on MRI, treatment resumed on 4/6/18 with no further obvious bleeding issues.  5. Cancer related pain:  · Previously receiving Duragesic 50 µg patch every 72 hours along with Dilaudid 4 mg as needed for breakthrough pain  · The patient's pain improved over time and she was able to discontinue both Duragesic and Dilaudid in the interval.  · Patient does take occasional Flexeril at bedtime due to back spasm/pain when she has been more active.  · Right hip pain as noted above due to right gluteal hematoma, previously prescribed Dilaudid 4 mg every 4 hours as needed #60 with no refill.  Pain from hematoma resolved.  · The patient does have some occasional aggravation of her chronic back pain with significant rehabilitation activity and requires an occasional dose of Dilaudid.  Pain currently improved.  6. Chemotherapy-induced diarrhea with subsequent C. difficile colitis:  · The patient developed initial diarrhea related to Xeloda at regular dosing.  · Symptoms improved on reduced  dose Xeloda  · Flare of symptoms in October 2018 with apparent finding of C. difficile colitis by GI, treated with course of oral vancomycin with improvement in symptoms.  · Currently with minimal intermittent loose stool.  Recently negative for C. difficile by GI.  7. Traumatic left tibia/fibular fracture:  · Status post ORIF 12/6/17  · Specimen was sent for pathologic review, negative for evidence of malignancy  8. Hypercalcemia:  · Suspect hypercalcemia of malignancy, calcium in  10-11 range previously.  · Calcium normalized following initiation of monthly Xgeva on 1/23/18.  9. Chemotherapy-induced mucositis:  · Patient had a minimal degree of mucositis with cycle 2.  The patient has magic mouthwash to use as needed.  No subsequent mucositis.  10. Recurrent UTI, bladder wall thickening on CT:  · Patient had an enterococcal UTI on 3/2/18 sensitive to nitrofurantoin and received treatment, unclear how long.  · Recurrent UTI 3/20/18 with urine culture growing Klebsiella, initially treated with nitrofurantoin, transitioned to Levaquin.  · CT 4/4/18 with diffuse bladder wall thickening with increased nodular thickening at the left base.  Referral to urogynecology Dr. May Johnson.  She was placed on a prophylactic dose of trimethoprim 100 mg daily, bladder wall thickening felt to be related to recent recurrent urinary tract infections.   · Enterococcus faecalis greater than 100,000 colonies was treated for 10 days with nitrofurantoin with resolution of symptoms.  11.   Mobility:  · The patient underwent an intensive course of [facility] at [facility].  She graduated from her outpatient course November 2018.  · Overall the patient has improved dramatically in terms of mobility, now often able to ambulate without the use of a walker and is achieving some independent activity.  12.  Depression:  · The patient weaned herself off of Cymbalta and reports that her symptoms remain stable.  13. Hand-foot syndrome secondary to  Xeloda:  · The patient has mild palmar and plantar erythema, no current skin peeling.  She continues to use Eucerin cream multiple times throughout the day.  14. Elevated liver function studies:  · Liver function studies increased 8/20/19 with ALT 98, AST 70, normal total bilirubin.  · Negative viral hepatitis A, B, and C panel 8/23/18  · Likely related to hepatic steatosis seen on CT, no definitive evidence of metastatic liver lesions.   · Gradual improvement of LFTs, currently normal today.  15. Chemotherapy induced leukopenia:  · Patient with mild leukopenia secondary to Xeloda, WBC 3.96, ANC adequate at 2.16.    Plan:  1. Begin cycle 14 Xeloda continuing with 1500 mg twice a day 7 days on followed by 7 days off  2. Monthly Xgeva today  3. Continue Lovenox 80 mg every 12 hours.  4. In 4 weeks nurse practitioner visit with CBC, CMP, magnesium, phosphorus.  The patient will be due for Xgeva.  She will also be due to begin cycle 15 Xeloda.  5. In 7 weeks CT chest abdomen pelvis and bone scan  6. In 8 weeks M.D. visit with CBC, CMP, magnesium, phosphorus.  The patient will be due for cycle 16 Xeloda and Xgeva.

## 2018-12-13 ENCOUNTER — TELEPHONE (OUTPATIENT)
Dept: ONCOLOGY | Facility: HOSPITAL | Age: 58
End: 2018-12-13

## 2018-12-13 NOTE — TELEPHONE ENCOUNTER
"----- Message from Dali Smith sent at 12/13/2018  3:47 PM EST -----  Contact: [phone]  Pt has a red place on her left leg that is warm.       Pt called stating that she has about a 3\" band around her left ankle that is warm to the touch. In the front of her ankle is a little knot. Pt is on Lovenox 80mg BID but did not take her AM dose although her symptoms started before she forgot her dose. She does say she takes her lovenox routinely. She also reports that she has braces on both legs. Reviewed with Giuliana Alejandro NP. Per Giuliana, pt needs to rest and elevate her legs tonight and if the redness/tenderness does not improve or gets worse, she needs to call us tomorrow and we can order a doppler. Informed pt and she v/u.   "

## 2018-12-31 ENCOUNTER — INFUSION (OUTPATIENT)
Dept: ONCOLOGY | Facility: HOSPITAL | Age: 58
End: 2018-12-31

## 2018-12-31 ENCOUNTER — LAB (OUTPATIENT)
Dept: OTHER | Facility: HOSPITAL | Age: 58
End: 2018-12-31

## 2018-12-31 ENCOUNTER — OFFICE VISIT (OUTPATIENT)
Dept: ONCOLOGY | Facility: CLINIC | Age: 58
End: 2018-12-31

## 2018-12-31 VITALS
RESPIRATION RATE: 16 BRPM | SYSTOLIC BLOOD PRESSURE: 132 MMHG | HEART RATE: 94 BPM | HEIGHT: 61 IN | OXYGEN SATURATION: 96 % | BODY MASS INDEX: 36.68 KG/M2 | DIASTOLIC BLOOD PRESSURE: 82 MMHG | TEMPERATURE: 97.6 F | WEIGHT: 194.3 LBS

## 2018-12-31 DIAGNOSIS — R60.0 BILATERAL LOWER EXTREMITY EDEMA: ICD-10-CM

## 2018-12-31 DIAGNOSIS — L27.1 HAND FOOT SYNDROME: ICD-10-CM

## 2018-12-31 DIAGNOSIS — C50.811 MALIGNANT NEOPLASM OF OVERLAPPING SITES OF RIGHT BREAST IN FEMALE, ESTROGEN RECEPTOR NEGATIVE (HCC): Primary | ICD-10-CM

## 2018-12-31 DIAGNOSIS — C50.811 MALIGNANT NEOPLASM OF OVERLAPPING SITES OF RIGHT BREAST IN FEMALE, ESTROGEN RECEPTOR NEGATIVE (HCC): ICD-10-CM

## 2018-12-31 DIAGNOSIS — C79.51 BONE METASTASES: ICD-10-CM

## 2018-12-31 DIAGNOSIS — Z17.1 MALIGNANT NEOPLASM OF OVERLAPPING SITES OF RIGHT BREAST IN FEMALE, ESTROGEN RECEPTOR NEGATIVE (HCC): ICD-10-CM

## 2018-12-31 DIAGNOSIS — Z17.1 MALIGNANT NEOPLASM OF OVERLAPPING SITES OF RIGHT BREAST IN FEMALE, ESTROGEN RECEPTOR NEGATIVE (HCC): Primary | ICD-10-CM

## 2018-12-31 DIAGNOSIS — R30.0 DYSURIA: ICD-10-CM

## 2018-12-31 DIAGNOSIS — G89.3 CANCER ASSOCIATED PAIN: ICD-10-CM

## 2018-12-31 LAB
ALBUMIN SERPL-MCNC: 4.3 G/DL (ref 3.5–5.2)
ALBUMIN/GLOB SERPL: 1.7 G/DL
ALP SERPL-CCNC: 66 U/L (ref 39–117)
ALT SERPL W P-5'-P-CCNC: 36 U/L (ref 1–33)
ANION GAP SERPL CALCULATED.3IONS-SCNC: 11.5 MMOL/L
AST SERPL-CCNC: 21 U/L (ref 1–32)
BACTERIA UR QL AUTO: ABNORMAL /HPF
BASOPHILS # BLD AUTO: 0.03 10*3/MM3 (ref 0–0.2)
BASOPHILS NFR BLD AUTO: 0.7 % (ref 0–1.5)
BILIRUB SERPL-MCNC: 0.3 MG/DL (ref 0.1–1.2)
BILIRUB UR QL STRIP: NEGATIVE
BUN BLD-MCNC: 22 MG/DL (ref 6–20)
BUN/CREAT SERPL: 24.7 (ref 7–25)
CALCIUM SPEC-SCNC: 9.3 MG/DL (ref 8.6–10.5)
CHLORIDE SERPL-SCNC: 104 MMOL/L (ref 98–107)
CLARITY UR: CLEAR
CO2 SERPL-SCNC: 26.5 MMOL/L (ref 22–29)
COLOR UR: ABNORMAL
CREAT BLD-MCNC: 0.89 MG/DL (ref 0.57–1)
DEPRECATED RDW RBC AUTO: 59.8 FL (ref 37–54)
EOSINOPHIL # BLD AUTO: 0.09 10*3/MM3 (ref 0–0.7)
EOSINOPHIL NFR BLD AUTO: 2 % (ref 0.3–6.2)
ERYTHROCYTE [DISTWIDTH] IN BLOOD BY AUTOMATED COUNT: 17.9 % (ref 11.7–13)
GFR SERPL CREATININE-BSD FRML MDRD: 65 ML/MIN/1.73
GLOBULIN UR ELPH-MCNC: 2.5 GM/DL
GLUCOSE BLD-MCNC: 102 MG/DL (ref 65–99)
GLUCOSE UR STRIP-MCNC: NEGATIVE MG/DL
HCT VFR BLD AUTO: 39.9 % (ref 35.6–45.5)
HGB BLD-MCNC: 13.2 G/DL (ref 11.9–15.5)
HGB UR QL STRIP.AUTO: ABNORMAL
HYALINE CASTS UR QL AUTO: ABNORMAL /LPF
IMM GRANULOCYTES # BLD AUTO: 0 10*3/MM3 (ref 0–0.03)
IMM GRANULOCYTES NFR BLD AUTO: 0 % (ref 0–0.5)
KETONES UR QL STRIP: NEGATIVE
LEUKOCYTE ESTERASE UR QL STRIP.AUTO: ABNORMAL
LYMPHOCYTES # BLD AUTO: 1.03 10*3/MM3 (ref 0.9–4.8)
LYMPHOCYTES NFR BLD AUTO: 23.3 % (ref 19.6–45.3)
MAGNESIUM SERPL-MCNC: 2.1 MG/DL (ref 1.6–2.6)
MCH RBC QN AUTO: 31.3 PG (ref 26.9–32)
MCHC RBC AUTO-ENTMCNC: 33.1 G/DL (ref 32.4–36.3)
MCV RBC AUTO: 94.5 FL (ref 80.5–98.2)
MONOCYTES # BLD AUTO: 0.51 10*3/MM3 (ref 0.2–1.2)
MONOCYTES NFR BLD AUTO: 11.5 % (ref 5–12)
NEUTROPHILS # BLD AUTO: 2.76 10*3/MM3 (ref 1.9–8.1)
NEUTROPHILS NFR BLD AUTO: 62.5 % (ref 42.7–76)
NITRITE UR QL STRIP: POSITIVE
NRBC BLD AUTO-RTO: 0 /100 WBC (ref 0–0)
PH UR STRIP.AUTO: 5.5 [PH] (ref 5–8)
PHOSPHATE SERPL-MCNC: 3.5 MG/DL (ref 2.5–4.5)
PLATELET # BLD AUTO: 277 10*3/MM3 (ref 140–500)
PMV BLD AUTO: 10.3 FL (ref 6–12)
POTASSIUM BLD-SCNC: 4.8 MMOL/L (ref 3.5–5.2)
PROT SERPL-MCNC: 6.8 G/DL (ref 6–8.5)
PROT UR QL STRIP: NEGATIVE
RBC # BLD AUTO: 4.22 10*6/MM3 (ref 3.9–5.2)
RBC # UR: ABNORMAL /HPF
REF LAB TEST METHOD: ABNORMAL
SODIUM BLD-SCNC: 142 MMOL/L (ref 136–145)
SP GR UR STRIP: 1.01 (ref 1–1.03)
SQUAMOUS #/AREA URNS HPF: ABNORMAL /HPF
UROBILINOGEN UR QL STRIP: ABNORMAL
WBC NRBC COR # BLD: 4.42 10*3/MM3 (ref 4.5–10.7)
WBC UR QL AUTO: ABNORMAL /HPF

## 2018-12-31 PROCEDURE — 36415 COLL VENOUS BLD VENIPUNCTURE: CPT

## 2018-12-31 PROCEDURE — 84100 ASSAY OF PHOSPHORUS: CPT | Performed by: INTERNAL MEDICINE

## 2018-12-31 PROCEDURE — 25010000002 DENOSUMAB 120 MG/1.7ML SOLUTION: Performed by: INTERNAL MEDICINE

## 2018-12-31 PROCEDURE — 85025 COMPLETE CBC W/AUTO DIFF WBC: CPT | Performed by: INTERNAL MEDICINE

## 2018-12-31 PROCEDURE — 87086 URINE CULTURE/COLONY COUNT: CPT | Performed by: NURSE PRACTITIONER

## 2018-12-31 PROCEDURE — 96372 THER/PROPH/DIAG INJ SC/IM: CPT | Performed by: NURSE PRACTITIONER

## 2018-12-31 PROCEDURE — 81001 URINALYSIS AUTO W/SCOPE: CPT | Performed by: NURSE PRACTITIONER

## 2018-12-31 PROCEDURE — 80053 COMPREHEN METABOLIC PANEL: CPT | Performed by: INTERNAL MEDICINE

## 2018-12-31 PROCEDURE — 83735 ASSAY OF MAGNESIUM: CPT | Performed by: INTERNAL MEDICINE

## 2018-12-31 PROCEDURE — 99215 OFFICE O/P EST HI 40 MIN: CPT | Performed by: NURSE PRACTITIONER

## 2018-12-31 RX ORDER — NITROFURANTOIN 25; 75 MG/1; MG/1
100 CAPSULE ORAL 2 TIMES DAILY
Qty: 10 CAPSULE | Refills: 0 | Status: SHIPPED | OUTPATIENT
Start: 2018-12-31 | End: 2019-05-06

## 2018-12-31 RX ADMIN — DENOSUMAB 120 MG: 120 INJECTION SUBCUTANEOUS at 12:27

## 2018-12-31 NOTE — PROGRESS NOTES
Subjective .     REASONS FOR FOLLOWUP:    1. Stage Ib ([id number]) right breast cancer: Diagnosed May 2010 with excisional biopsy for invasive ductal carcinoma, 1.3 cm, grade 2, ER 90%, AZ 80%, HER-2/peter negative (1+ IHC).  Subsequent right mastectomy in July 2010 with no residual breast malignancy, 1/5 sentinel lymph node with micrometastasis (0.25 mm).  Treated in the [facility] system with adjuvant AC ×4 cycles in 2010 (no taxanes administered due to underlying Charcot-Saloni-Tooth with peripheral neuropathy).  Adjuvant Femara (postmenopausal) initiated October 2010 with plan to treat ×10 years.  Genetic testing reportedly negative.  Developed osteopenia treated with Prolia beginning 2/27/13.  2. Recurrent/metastatic disease identified 10/8/17 involving thoracic spine with cord compression at T6, lumbosacral involvement, sternal and right sternoclavicular involvement.  Femara discontinued.  Radiation administered (in the [facility] system) to the thoracic spine beginning 10/19/17 treating T3-T9 to a dose of 24 gray in 6 fractions.  3. Evaluation with MRI 12/8/17 showing persistent T6 cord compression with persistent neurologic compromise requiring surgical treatment 12/11/17 with T6 laminectomy/corpectomy and T3-T9 fusion.  Pathology with metastatic carcinoma of breast origin, ER negative, AZ negative, HER-2/peter negative (1+ IHC).  4. Right pulmonary embolism 10/21/17 continuing on Lovenox 1 mg/kg (100 mg) every 12 hours.  No evidence of DVT.  Lovenox held due to right gluteus hematoma 3/23/18 through 4/6/18.  5. Cancer related pain previously on Duragesic 50 µg patch every 72 hours and Dilaudid 4 mg as needed for breakthrough pain.  Narcotics subsequently discontinued with improvement in pain in January 2018.  6. Radiation therapy to L3 dural metastasis and left humerus initiated 1/15/18 (10 fractions), completed 1/26/18  7. Monthly Xgeva initiated 1/23/18  8. Mild hypercalcemia of malignancy  9. Initiation of  palliative oral single agent Xeloda 2/7/18 (2000 mg a.m., 1500 mg p.m. for 14/21 days).  10. 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  In reviewing prior CT scans, has been present for some time.  Monitor for now, if it enlarges consider surgical excision.  11. Cumulative side effects from Xeloda with fatigue, anorexia, diarrhea, increased tearing.  Alteration in dose/schedule with cycle 11 to 1500 mg twice a day for 7 days on followed by 7 days off.    HISTORY OF PRESENT ILLNESS:  The patient is a 58 y.o. year old female who is here for follow-up with the above-mentioned history.    History of Present Illness the patient returns today in follow-up due to begin cycle 15 of Xeloda and also continuing on monthly Xgeva that is due today.  She continues to tolerate treatment fairly well.  She is using frequent moisturization which includes Eucerin several times a day to her upper and lower extremities.  Her hands and feet do have a modest degree of erythema. She denies any cracking or peeling of her extremities and no associated pain is reported.    She has noticed infrequent left, lower extremity edema. Edema is intermittent in nature and she denies any discomfort or warmth. No edema is present today. She continues to wear bilateral leg braces.     She is maintaining adequate nutrition and hydration.  She was recently diagnosed with Clostridium difficile colitis approximately 2 months ago and completed a course of oral vancomycin.  Thankfully, her bowels have normalized and she has had no further issues with this.  She denies any infectious symptoms including fevers or chills.  No excessive bleeding or bruising reported.    Finally, patient has a history of recurrent urinary tract infections with bladder wall thickening on CT imaging. Patient  developed dysuria and urinary frequency yesterday. She began taking Pyridium and had 1 dose of Macrobid from a prior urinary tract infection, which she proceeded to take.  She denies any associated flank pain or fevers.  No hematuria reported.    She has no other concerns today.   .    Past Medical History:   Diagnosis Date   • Acute pulmonary embolism, cancer-related 10/2017   • Allergic rhinitis    • Arthritis     Right knee; left shoulder   • Cancer (CMS/Formerly [facility]) 2010    Right breast   • Cancer (CMS/Formerly [facility]) 10/2017    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   • Charcot-Saloni-Tooth disease    • CPAP (continuous positive airway pressure) dependence    • GERD (gastroesophageal reflux disease)    • H/O Colon polyps    • H/O Uterine fibroid    • Heart murmur    • Hyperlipidemia    • Hypertension    • PONV (postoperative nausea and vomiting)      Past Surgical History:   Procedure Laterality Date   • BLADDER SURGERY      Bladder lift   • BREAST RECONSTRUCTION, BREAST TISSUE EXPANDER INSERTION Right    • BREAST SURGERY Right 2010    Mastectomy   •  SECTION  ,    • CHOLECYSTECTOMY     • COLONOSCOPY     • HERNIA REPAIR  2015    [facility]   • HYSTERECTOMY      Partial   • KNEE SURGERY Right    • LEG SURGERY Left     Broken   • MASTECTOMY Right    • MD TREAT TIBIAL SHAFT FX, INTRAMED IMPLANT Left 2017    Procedure: TIBIA INTRAMEDULLARY NAIL/DON INSERTION;  Surgeon: Romero Sahnks MD;  Location: [facility];  Service: Orthopedics   • THORACIC DECOMPRESSION POSTERIOR FUSION WITH INSTRUMENTATION N/A 2017    Procedure: T6 costotransversectomy and decompression with T3 to  T9 posterior spinal fusion with instrumentation with Jennifer Gonzalez and Nael Dennis Cellsaver;  Surgeon: Quan Nayak MD;  Location: [facility];  Service:        ONCOLOGIC HISTORY:  (History from previous dates can be found in the separate document.)    Current Outpatient Medications on File Prior to Visit    Medication Sig Dispense Refill   • acetaminophen (TYLENOL) 500 MG tablet Take 500 mg by mouth Every 6 (Six) Hours As Needed for Mild Pain .     • calcium carbonate (OS-CARY) 600 MG tablet Take 600 mg by mouth 2 (Two) Times a Day With Meals.     • capecitabine (XELODA) 500 MG chemo tablet Take 3 tabs (1500 mg) po twice a day for 7 days on then 7 days off. 84 tablet 3   • cholecalciferol (VITAMIN D3) 1000 units tablet Take 1,000 Units by mouth Daily.     • cyclobenzaprine (FLEXERIL) 10 MG tablet Take 1 tablet by mouth 3 (Three) Times a Day As Needed for Muscle Spasms. 30 tablet 3   • diazePAM (VALIUM) 10 MG tablet Take 1 tablet by mouth Every 12 (Twelve) Hours As Needed for Anxiety. 10 tablet 1   • diphenoxylate-atropine (LOMOTIL) 2.5-0.025 MG per tablet Take 1 tablet by mouth 4 (Four) Times a Day As Needed for Diarrhea. 60 tablet 2   • enoxaparin (LOVENOX) 80 MG/0.8ML solution syringe INJECT 0.8 ML UNDER THE SKIN Q 12 H  3   • enoxaparin (LOVENOX) 80 MG/0.8ML solution syringe INJECT 0.8 ML UNDER THE SKIN EVERY 12 HOURS 48 mL 1   • FLONASE ALLERGY RELIEF 50 MCG/ACT nasal spray 2 sprays into each nostril daily.  11   • gabapentin (NEURONTIN) 300 MG capsule Take 1 capsule by mouth 3 (Three) Times a Day. 90 capsule 2   • HYDROmorphone (DILAUDID) 4 MG tablet Take 1 tablet by mouth Every 4 (Four) Hours As Needed for Moderate Pain . 60 tablet 0   • mesalamine (LIALDA) 1.2 g EC tablet TAKE 1 TABLET BY MOUTH TWICE DAILY 180 tablet 0   • nitrofurantoin (MACRODANTIN) 100 MG capsule Take 1 capsule by mouth 2 (Two) Times a Day. 20 capsule 0   • omeprazole (PriLOSEC) 40 MG capsule TAKE 1 CAPSULE DAILY 90 capsule 2   • ondansetron ODT (ZOFRAN-ODT) 8 MG disintegrating tablet Take 1 tablet by mouth Every 8 (Eight) Hours As Needed for Nausea or Vomiting. 30 tablet 1   • phenazopyridine (PYRIDIUM) 200 MG tablet Take 200 mg by mouth 3 (Three) Times a Day As Needed for bladder spasms.     • prochlorperazine (COMPAZINE) 10 MG tablet Take  1 tablet by mouth Every 6 (Six) Hours As Needed for Nausea or Vomiting. 30 tablet 0   • sennosides-docusate sodium (SENOKOT-S) 8.6-50 MG tablet Take 2 tablets by mouth 2 (Two) Times a Day. 90 tablet 2   • traMADol (ULTRAM) 50 MG tablet Take 1 tablet by mouth Every 8 (Eight) Hours As Needed for Moderate Pain . 90 tablet 2   • trimethoprim (TRIMPEX) 100 MG tablet Take 100 mg by mouth Daily.  2   • Tuberculin PPD (APLISOL ID) Inject  into the skin.     • Unable to find SWISH AND SPIT 5 ML PO Q 2 H PRN  0     No current facility-administered medications on file prior to visit.        ALLERGIES:     Allergies   Allergen Reactions   • Hydrocodone Nausea Only   • Morphine And Related Nausea And Vomiting     Social History     Socioeconomic History   • Marital status:      Spouse name: Murray   • Number of children: 3   • Years of education: College   • Highest education level: Not on file   Social Needs   • Financial resource strain: Not on file   • Food insecurity - worry: Not on file   • Food insecurity - inability: Not on file   • Transportation needs - medical: Not on file   • Transportation needs - non-medical: Not on file   Occupational History     Employer: [employer]     Employer: RETIRED   Tobacco Use   • Smoking status: Never Smoker   • Smokeless tobacco: Never Used   Substance and Sexual Activity   • Alcohol use: Yes     Comment: social   • Drug use: No   • Sexual activity: Defer   Other Topics Concern   • Not on file   Social History Narrative   • Not on file     Family History   Problem Relation Age of Onset   • Heart disease Mother    • Hyperlipidemia Mother    • Hypertension Mother    • Diabetes Father    • Charcot-Saloni-Tooth disease Father    • Heart disease Father    • Hypertension Father    • Heart failure Father    • Diabetes Sister    • Heart disease Sister    • Hypertension Sister    • Heart disease Maternal Aunt    • Scoliosis Maternal Aunt    • Heart disease Maternal Uncle    • Diabetes  Maternal Grandmother    • Heart disease Maternal Grandmother    • Hypertension Maternal Grandmother    • Uterine cancer Maternal Grandmother    • Heart disease Maternal Grandfather      Review of Systems   Constitutional: Positive for fatigue. Negative for activity change, appetite change, fever and unexpected weight change.   HENT: Negative for congestion, mouth sores, nosebleeds, sore throat and voice change.    Eyes: Negative for discharge.   Respiratory: Negative for cough, shortness of breath and wheezing.    Cardiovascular: Negative for chest pain, palpitations and leg swelling.   Gastrointestinal: Negative for abdominal distention, abdominal pain, blood in stool, constipation, diarrhea (see HPI ), nausea and vomiting.   Endocrine: Negative for cold intolerance and heat intolerance.   Genitourinary: Positive for dysuria and frequency. Negative for difficulty urinating.        See HPI    Musculoskeletal: Positive for arthralgias. Negative for back pain, joint swelling and myalgias.   Skin: Positive for rash. Negative for wound.        See HPI    Neurological: Negative for dizziness, syncope, weakness, light-headedness, numbness and headaches.   Hematological: Negative for adenopathy. Does not bruise/bleed easily.   Psychiatric/Behavioral: Negative for confusion and sleep disturbance. The patient is not nervous/anxious.          Objective      Vitals:    12/31/18 1127   BP: 132/82   Pulse: 94   Resp: 16   Temp: 97.6 °F (36.4 °C)   SpO2: 96%      Current Status 12/31/2018   ECOG score 1   Pain 0/10    Physical Exam   Constitutional: She is oriented to person, place, and time. She appears well-developed and well-nourished.   The patient is ambulating without a walker today   HENT:   Mouth/Throat: Oropharynx is clear and moist.   Eyes: Conjunctivae are normal.   Neck: No thyromegaly present.   Cardiovascular: Normal rate and regular rhythm. Exam reveals no gallop and no friction rub.   No murmur  heard.  Pulmonary/Chest: Breath sounds normal. No respiratory distress.   Abdominal: Soft. Bowel sounds are normal. She exhibits no distension. There is no tenderness.   Musculoskeletal: She exhibits edema.   Lower extremity braces in place.  Trace bilateral lower extremity edema. Left leg is slightly greater than right. Erythema of extremities with no cracking or peeling of skin    Lymphadenopathy:        Head (right side): No submandibular adenopathy present.     She has no cervical adenopathy.     She has no axillary adenopathy.        Right: No inguinal and no supraclavicular adenopathy present.        Left: No inguinal and no supraclavicular adenopathy present.   Neurological: She is alert and oriented to person, place, and time. No cranial nerve deficit.   Bilateral lower extremity strength, 4+/5   Skin: Skin is warm and dry. No rash noted.   Slight reddish discoloration the palms of hands, no skin peeling, Bruising of her abdomen secondary to lovenox    Psychiatric: She has a normal mood and affect. Her behavior is normal.       RECENT LABS:  Hematology WBC   Date Value Ref Range Status   12/31/2018 4.42 (L) 4.50 - 10.70 10*3/mm3 Final     RBC   Date Value Ref Range Status   12/31/2018 4.22 3.90 - 5.20 10*6/mm3 Final     Hemoglobin   Date Value Ref Range Status   12/31/2018 13.2 11.9 - 15.5 g/dL Final     Hematocrit   Date Value Ref Range Status   12/31/2018 39.9 35.6 - 45.5 % Final     Platelets   Date Value Ref Range Status   12/31/2018 277 140 - 500 10*3/mm3 Final        Lab Results   Component Value Date    GLUCOSE 102 (H) 12/31/2018    BUN 22 (H) 12/31/2018    CREATININE 0.89 12/31/2018    EGFRIFNONA 65 12/31/2018    EGFRIFAFRI 80 09/25/2017    BCR 24.7 12/31/2018    K 4.8 12/31/2018    CO2 26.5 12/31/2018    CALCIUM 9.3 12/31/2018    PROTENTOTREF 6.4 09/25/2017    ALBUMIN 4.30 12/31/2018    LABIL2 2.2 09/25/2017    AST 21 12/31/2018    ALT 36 (H) 12/31/2018         Assessment/Plan      1. Previous Stage  Ib ([id number]) right breast cancer:  · Diagnosed May 2010 with excisional biopsy for invasive ductal carcinoma, 1.3 cm, grade 2, ER 90%, TX 80%, HER-2/peter negative (1+ IHC).    · Subsequent right mastectomy in July 2010 with no residual breast malignancy, 1/5 sentinel lymph node with micrometastasis (0.25 mm).    · Treated in the [facility] system with adjuvant AC ×4 cycles in 2010 (no taxanes administered due to underlying Charcot-Saloni-Tooth with peripheral neuropathy).    · Adjuvant Femara (postmenopausal) initiated October 2010 with plan to treat ×10 years.    · Genetic testing reportedly negative.    · Developed osteopenia treated with Prolia beginning 2/27/13. Subsequently discontinued due to identification of metastatic disease.  2. Recurrent/metastatic disease identified 10/8/17:  · Disease involving thoracic spine with cord compression at T6, lumbosacral involvement, sternal and right sternoclavicular involvement.    · Femara discontinued in 10/2017.    · Radiation administered (in the [facility] system) to the thoracic spine beginning 10/19/17 treating T3-T9 to a dose of 24 gray in 6 fractions.  · Evaluation with MRI 12/8/17 showing persistent T6 cord compression with persistent neurologic compromise requiring surgical treatment 12/11/17 with T6 laminectomy/corpectomy and T3-T9 fusion.  Pathology with metastatic carcinoma of breast origin, ER negative, TX negative, HER-2/peter negative (1+ IHC).  · Additional staging evaluation 12/8/17 with no evidence of visceral metastatic disease, bone scan showing involvement of thoracic spine, sternum, left humerus, mid frontal bone.  No plane film correlate of left humerus lesion.  MRI lumbar spine with small intradural L3 metastasis.  CA 15-3 12/6/17- 17.  · Palliative radiation therapy to L3 dural metastasis and left humerus initiated 1/15/18 (10 fractions), completed 1/26/18.  · Hypercalcemia of malignancy with calcium in the 10-11 range.  · Initiation of monthly Xgeva  1/23/18.  · Baseline CT scan 1/30/18 with no evidence of visceral involvement.  Cluster of nodular opacities in the right lower lobe suspected to be infectious or related to bronchiolitis. Bone scan 1/30/18 showed postsurgical change in the thoracic spine, stable uptake in the frontal bone, no new areas of disease.  · Initiation of palliative oral single agent Xeloda 2/7/18 2000 mg a.m., 1500 mg p.m. for 14/21 days.   · Following 3 cycles xeloda, bone scan 4/4/18 showed no change from the prior study.  CT scan 4/4/18 showed a small pericardial effusion of unclear significance as well as a subcutaneous nodule in the right lateral chest wall.  Subsequent evaluation with echocardiogram 4/17/18 showed no evidence of pericardial effusion.  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  We discussed the possibility of surgical excision of the right subcutaneous chest wall lesion for more definitive diagnosis.  Reviewed previous CT images dating back to 12/8/17 and the lesion was present even at that time measuring around 1.7 cm although not commented on in the radiology report.  As this appears to be an indolent low-grade process unrelated to her breast cancer, recommendee foregoing surgical excision at this time and monitoring this area on future scans.  The patient agreed.    · Following 6 cycles of Xeloda, CT 6/6/18  showed stable findings, no evidence of progressive disease.  There was a comment regarding subcutaneous abnormality in the anterior abdominal wall and this was related to Lovenox injection sites.  Bone scan 6/6/18 showed no interval change.   · CT scan and bone scan 8/13/18 following 9 cycles of Xeloda showed stable findings with no evidence of significant visceral metastases.  Her bone lesions appear stable on bone scan.  The spindle cell neoplasm in her right chest  wall actually decreased in size from 2 cm down to 1.6 cm.    · The patient experienced some symptoms of diarrhea, anorexia, generalized weakness during cycle 9 Xeloda it was unclear whether this was related to a viral gastroenteritis or toxicity from treatment.  Symptoms recurred during cycle 10 and treatment was cut short by 2 days.  Symptoms attributed to Xeloda.  With cycle 11, dose and schedule altered to 1500 mg twice daily for 7 days on followed by 7 days off .  · CT scan and bone scan from 10/31/18 following 12 cycles of Xeloda showed stable disease.    · Patient returns today in follow-up due to begin her 15th cycle of Xeloda and her next monthly dose of Xgeva.  She continues to tolerate treatment reasonably well.  Today, she does report occasional left lower extremity swelling.  She denies any associated discomfort or warmth to touch.  She has also developed dysuria and urinary frequency over the last 24 hours, in the absence of fever or chills.  She began taking a left overdose of Macrobid yesterday for management.  This will be further detailed below.  Her labs today are all adequate for treatment, therefore she will proceed with Xeloda, 1500 mg twice daily for 7 days on and 7 days off.  She'll have repeat CT imaging of the CAP and a bone scan on 1/21/2019 and will follow up with Dr. Hancock on 1/28/2019 for her next monthly dose of Xgeva and scan review.   3. Right hip/gluteal hematoma:  · The patient had developed considerable pain in the right hip and underwent MRI 3/22/18 visit showed a 5.7 x 4.3 x 11 cm hematoma in the right gluteal region.  · Lovenox was held 3/23 through 4/6/18.  Resolution of pain, Lovenox was resumed. Hematoma likely occurred due to minimal trauma from transfer out of her wheelchair.   4. Right pulmonary embolism:  · Diagnosed on CT angiogram 10/21/17 involving small right lower lobe pulmonary artery.  Lower extremity Dopplers negative.  · Bilateral lower extremity Dopplers  negative again 12/5/17.  · Continuing on Lovenox 1 mg/kg (80 mg) subcutaneous injection every 12 hours.  Lovenox was held for 2 weeks due to right hip hematoma seen on MRI, treatment resumed on 4/6/18 with no further obvious bleeding issues.  · She has had occasional left, lower extremity edema but denies any warmth or discomfort. Only trace edema present on exam today. I have asked her to call should this progress. She will continue on Lovenox BID   5. Cancer related pain:  · Previously receiving Duragesic 50 µg patch every 72 hours along with Dilaudid 4 mg as needed for breakthrough pain  · The patient's pain improved over time and she was able to discontinue both Duragesic and Dilaudid in the interval.  · Patient does take occasional Flexeril at bedtime due to back spasm/pain when she has been more active.  · Right hip pain as noted above due to right gluteal hematoma, previously prescribed Dilaudid 4 mg every 4 hours as needed #60 with no refill.  Pain from hematoma resolved.  · The patient does have some occasional aggravation of her chronic back pain with significant rehabilitation activity and requires an occasional dose of Dilaudid.  Pain is stable on her current regimen   6. Chemotherapy-induced diarrhea with subsequent C. difficile colitis:  · The patient developed initial diarrhea related to Xeloda at regular dosing.  · Symptoms improved on reduced dose Xeloda  · Flare of symptoms in October 2018 with apparent finding of C. difficile colitis by GI, treated with course of oral vancomycin with improvement in symptoms.  ·   Recently negative for C. difficile by GI.  · Stools have normalized   7. Traumatic left tibia/fibular fracture:  · Status post ORIF 12/6/17  · Specimen was sent for pathologic review, negative for evidence of malignancy  8. Hypercalcemia:  · Suspect hypercalcemia of malignancy, calcium in  10-11 range previously.  · Calcium normalized following initiation of monthly Xgeva on  "1/23/18.  · Calcium today 9.3  9. Chemotherapy-induced mucositis:  · Patient had a minimal degree of mucositis with cycle 2.  The patient has magic mouthwash to use as needed.  No subsequent mucositis.Not an issue today   10. Recurrent UTI, bladder wall thickening on CT:  · Patient had an enterococcal UTI on 3/2/18 sensitive to nitrofurantoin and received treatment, unclear how long.  · Recurrent UTI 3/20/18 with urine culture growing Klebsiella, initially treated with nitrofurantoin, transitioned to Levaquin.  · CT 4/4/18 with diffuse bladder wall thickening with increased nodular thickening at the left base.  Referral to urogynecology Dr. May Johnson.  She was placed on a prophylactic dose of trimethoprim 100 mg daily, bladder wall thickening felt to be related to recent recurrent urinary tract infections.   · Enterococcus faecalis greater than 100,000 colonies was treated for 10 days with nitrofurantoin with resolution of symptoms.  · As above, patient began experiencing dysuria and frequency yesterday and self-treated with macrobid and pyridium.  Urinalysis performed today revealing 2+ blood, small leukocyte esterase, and positive nitrites.  I provided the patient with a 5 day course of Macrobid and have instructed her not to begin taking \"old\" prescriptions that she has at home.  Urine culture is pending.  I have asked her to call should her symptoms worsen.  11.   Mobility:  · The patient underwent an intensive course of rehabilitation at [facility].  She graduated from her outpatient course November 2018.  · Overall the patient has improved dramatically in terms of mobility, now often able to ambulate without the use of a walker and is achieving some independent activity.  · She was able to ambulate without cane today   12.  Depression:  · The patient weaned herself off of Cymbalta and reports that her symptoms remain stable.  13. Hand-foot syndrome secondary to Xeloda:  · The patient has mild palmar and plantar " erythema, no current skin peeling.  She continues to use Eucerin cream multiple times throughout the day.  · This is stable today   14. Elevated liver function studies:  · Liver function studies increased 8/20/19 with ALT 98, AST 70, normal total bilirubin.  · Negative viral hepatitis A, B, and C panel 8/23/18  · Likely related to hepatic steatosis seen on CT, no definitive evidence of metastatic liver lesions.   · AST normal at 21 today. ALT is slightly elevated at 36. We will continue to monitor closely   15. Chemotherapy induced leukopenia:  · Total white blood cell count has recovered to 4.42 today.  ANC is stable at 2.76    Plan:  1. Begin cycle 15 Xeloda continuing with 1500 mg twice a day 7 days on followed by 7 days off  2. Monthly Xgeva today  3. Continue Lovenox 80 mg every 12 hours..  4. Patient to continue frequent moisturization of her extremities  5. 5 day course of Macrobid electronically sent to patient's pharmacy.  Patient instructed not to self treat from home.  She has verbalized understanding.  Urine culture is currently pending.  I have asked her to call should symptoms worsen  6. In 3 weeks CT chest abdomen pelvis and bone scan  7. In 4 weeks M.D. visit with CBC, CMP, magnesium, phosphorus.  The patient will be due for cycle 16 Xeloda and Xgeva.

## 2019-01-02 LAB — BACTERIA SPEC AEROBE CULT: NORMAL

## 2019-01-04 ENCOUNTER — TELEPHONE (OUTPATIENT)
Dept: INTERNAL MEDICINE | Facility: CLINIC | Age: 59
End: 2019-01-04

## 2019-01-04 DIAGNOSIS — M19.019 OSTEOARTHRITIS OF SHOULDER, UNSPECIFIED LATERALITY, UNSPECIFIED OSTEOARTHRITIS TYPE: ICD-10-CM

## 2019-01-04 DIAGNOSIS — S22.058D OTHER CLOSED FRACTURE OF SIXTH THORACIC VERTEBRA WITH ROUTINE HEALING, SUBSEQUENT ENCOUNTER: ICD-10-CM

## 2019-01-04 DIAGNOSIS — S82.242A CLOSED DISPLACED SPIRAL FRACTURE OF SHAFT OF LEFT TIBIA, INITIAL ENCOUNTER: ICD-10-CM

## 2019-01-04 DIAGNOSIS — M17.9 OSTEOARTHRITIS OF KNEE, UNSPECIFIED LATERALITY, UNSPECIFIED OSTEOARTHRITIS TYPE: ICD-10-CM

## 2019-01-04 DIAGNOSIS — C79.51 BONE METASTASES: Primary | ICD-10-CM

## 2019-01-04 NOTE — TELEPHONE ENCOUNTER
Pt is asking for a referral to [facility] for PT water therapy.  Said you all talked about it at her last visit and was told to make sure she called at first of year to get referral

## 2019-01-09 ENCOUNTER — HOSPITAL ENCOUNTER (OUTPATIENT)
Dept: PHYSICAL THERAPY | Facility: HOSPITAL | Age: 59
Setting detail: THERAPIES SERIES
Discharge: HOME | End: 2019-01-09

## 2019-01-09 DIAGNOSIS — Z74.09 IMPAIRED MOBILITY AND ENDURANCE: Primary | ICD-10-CM

## 2019-01-09 PROCEDURE — 97161 PT EVAL LOW COMPLEX 20 MIN: CPT | Performed by: PHYSICAL THERAPIST

## 2019-01-09 PROCEDURE — 97110 THERAPEUTIC EXERCISES: CPT | Performed by: PHYSICAL THERAPIST

## 2019-01-09 NOTE — THERAPY EVALUATION
Outpatient Physical Therapy Ortho Initial Evaluation  [facility]     Patient Name: Martha Davey  : 1960  MRN: [id number]  Today's Date: 2019      Visit Date: 2019    Patient Active Problem List   Diagnosis   • Malignant neoplasm of overlapping sites of right breast in female, estrogen receptor negative (CMS/HCC)   • Hematuria   • Hyperlipidemia   • Hypertension   • Hereditary sensorimotor neuropathy   • Hyperglycemia   • MARIA M on CPAP   • Gastroesophageal reflux disease   • Colon polyp   • Diverticulitis of colon with perforation   • Foot pain   • Osteoarthritis of knee   • Leukocytosis   • Osteoarthritis of shoulder   • Senile osteopenia   • Long term current use of aromatase inhibitor   • Chronic right-sided thoracic back pain   • Closed fracture of left fibula and tibia   • Closed displaced spiral fracture of shaft of left tibia   • Cancer associated pain   • Cord compression (CMS/HCC)   • Drug induced constipation   • Closed T6 spinal fracture (CMS/HCC)   • History of pulmonary embolism   • S/P ORIF (open reduction internal fixation) fracture   • Bone metastases (CMS/HCC)   • Surgery follow-up examination   • Dysuria   • Hypercalcemia of malignancy   • Pericardial effusion   • Other fatigue        Past Medical History:   Diagnosis Date   • Acute pulmonary embolism, cancer-related 10/2017   • Allergic rhinitis    • Arthritis     Right knee; left shoulder   • Cancer (CMS/HCC) 2010    Right breast   • Cancer (CMS/HCC) 10/2017    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   • Charcot-Saloni-Tooth disease    • CPAP (continuous positive airway pressure) dependence    • GERD (gastroesophageal reflux disease)    • H/O Colon polyps    • H/O Uterine fibroid    • Heart murmur    • Hyperlipidemia    • Hypertension    • PONV (postoperative nausea and vomiting)         Past Surgical History:   Procedure Laterality Date   • BLADDER SURGERY      Bladder lift   • BREAST RECONSTRUCTION, BREAST TISSUE  EXPANDER INSERTION Right 2010   • BREAST SURGERY Right 2010    Mastectomy   •  SECTION  ,    • CHOLECYSTECTOMY     • COLONOSCOPY     • HERNIA REPAIR  2015    [facility]   • HYSTERECTOMY      Partial   • KNEE SURGERY Right    • LEG SURGERY Left     Broken   • MASTECTOMY Right 2010   • DC TREAT TIBIAL SHAFT FX, INTRAMED IMPLANT Left 2017    Procedure: TIBIA INTRAMEDULLARY NAIL/DON INSERTION;  Surgeon: Romero Shanks MD;  Location: [facility];  Service: Orthopedics   • THORACIC DECOMPRESSION POSTERIOR FUSION WITH INSTRUMENTATION N/A 2017    Procedure: T6 costotransversectomy and decompression with T3 to  T9 posterior spinal fusion with instrumentation with Jennifer Gonzalez and Nael Wilkes;  Surgeon: Quan Nayak MD;  Location: [facility];  Service:        Visit Dx:     ICD-10-CM ICD-9-CM   1. Impaired mobility and endurance Z74.09 V49.89       Patient History     Row Name 19 1500             History    Chief Complaint  Muscle weakness;Pain  -      Type of Pain  Back pain;Shoulder pain;Knee pain  -      Date Current Problem(s) Began  -- 2018  -      Brief Description of Current Complaint  Patient was diagnosed with breast cancer 9 years ago. Last October the cancer returned and metastasized to her bones. She is taking bone shots every 4 weeks and is on chemo pills every other week. Patient complains of increased aching pain with prolonged standing and walking. Her goal with therapy is to have better mobility and endurance.   -      Previous treatment for THIS PROBLEM  Other (comment) chemotherapy  -      Patient/Caregiver Goals  Relieve pain;Improve mobility;Improve strength  -      Hand Dominance  right-handed  -      Occupation/sports/leisure activities  travel, gardening, walking  -      Patient seeing anyone else for problem(s)?  yes, Dr. Chanel  -      How has patient tried to help current problem?  chemotherapy  -         Pain      Pain Location  Arm;Knee  -      Pain at Present  7  -      Pain at Best  6  -KH      Pain at Worst  9  -      Pain Frequency  Constant/continuous  -      Pain Description  Stabbing;Aching  -      What Performance Factors Make the Current Problem(s) WORSE?  walking, standing, later in the day  -      What Performance Factors Make the Current Problem(s) BETTER?  resting, tylenol  -      Tolerance Time- Standing  10 min  -KH      Tolerance Time- Walking  10 min  -      Is your sleep disturbed?  Yes  -      Is medication used to assist with sleep?  Yes tramadol, muscle relaxers  -      Total hours of sleep per night  6 hours  -KH      Difficulties at work?  retired  -      Difficulties with ADL's?  slow to get dressed, right knee achy in AM  -      Difficulties with recreational activities?  not able to go on leisure walks or garden as much as she'd like  -         Fall Risk Assessment    Any falls in the past year:  Yes  -      Number of falls reported in the last 12 months  1  -      Factors that contributed to the fall:  Lost balance  -      Does patient have a fear of falling  Yes (comment)  -         Daily Activities    Primary Language  English  -KH      Are you able to read  Yes  -KH      Are you able to write  Yes  -      Patient is concerned about/has problems with  Sitting;Standing;Transfers (getting out of a chair, bed);Walking;Performing sports, recreation, and play activities;Performing job responsibilities/community activities (work, school,;Difficulty with self care (i.e. bathing, dressing, toileting:;Climbing Stairs;Bed Mobility;Flexibility;Performing home management (household chores, shopping, care of dependents)  -        User Key  (r) = Recorded By, (t) = Taken By, (c) = Cosigned By    Initials Name Provider Type    Ami Colon, PT Physical Therapist          PT Ortho     Row Name 01/09/19 1500       Subjective Comments    Subjective Comments  I have primary  issues with my left shoulder, right knee, and mid back  -KH       Subjective Pain    Able to rate subjective pain?  yes  -KH    Pre-Treatment Pain Level  7  -KH    Post-Treatment Pain Level  7  -KH       Posture/Observations    Alignment Options  Forward head;Cervical lordosis;Thoracic kyphosis;Rounded shoulders;Scapular elevation;Scapular winging  -KH    Forward Head  Mild  -KH    Cervical Lordosis  Mild  -KH    Thoracic Kyphosis  Mild  -KH       General ROM    RT Upper Ext  Rt Shoulder Flexion;Rt Shoulder ABduction;Rt Shoulder External Rotation;Rt Shoulder Internal Rotation  -KH    LT Upper Ext  Lt Shoulder ABduction;Lt Shoulder Flexion;Lt Shoulder External Rotation;Lt Shoulder Internal Rotation  -KH       Right Upper Ext    Rt Shoulder Abduction AROM  162  -KH    Rt Shoulder Flexion AROM  160  -KH       Left Upper Ext    Lt Shoulder Abduction AROM  90  -KH    Lt Shoulder Flexion AROM  100  -KH       MMT (Manual Muscle Testing)    Rt Upper Ext  Rt Shoulder Flexion;Rt Shoulder ABduction;Rt Shoulder Internal Rotation;Rt Shoulder External Rotation  -KH    Lt Upper Ext  Lt Shoulder Flexion;Lt Shoulder ABduction;Lt Shoulder Internal Rotation;Lt Shoulder External Rotation  -KH    Rt Lower Ext  Rt Hip Flexion;Rt Hip Extension;Rt Hip ABduction;Rt Knee Extension;Rt Knee Flexion;Rt Ankle Plantarflexion;Rt Ankle Dorsiflexion  -KH    Lt Lower Ext  Lt Hip Flexion;Lt Hip Extension;Lt Hip ABduction;Lt Knee Extension;Lt Knee Flexion;Lt Ankle Plantarflexion;Lt Ankle Dorsiflexion  -KH       MMT Right Upper Ext    Rt Shoulder Flexion MMT, Gross Movement  (4/5) good  -KH    Rt Shoulder ABduction MMT, Gross Movement  (4/5) good  -KH       MMT Left Upper Ext    Lt Shoulder Flexion MMT, Gross Movement  (3+/5) fair plus  -KH    Lt Shoulder ABduction MMT, Gross Movement  (3+/5) fair plus  -KH       MMT Right Lower Ext    Rt Hip Flexion MMT, Gross Movement  (4+/5) good plus  -KH    Rt Knee Extension MMT, Gross Movement  (4+/5) good plus   -KH    Rt Knee Flexion MMT, Gross Movement  (4+/5) good plus  -KH    Rt Ankle Plantarflexion MMT, Gross Movement  (3/5) fair  -KH    Rt Ankle Dorsiflexion MMT, Gross Movement  (2-/5) poor minus  -KH       MMT Left Lower Ext    Lt Hip Flexion MMT, Gross Movement  (4+/5) good plus  -KH    Lt Knee Extension MMT, Gross Movement  (4+/5) good plus  -KH    Lt Knee Flexion MMT, Gross Movement  (4+/5) good plus  -KH    Lt Ankle Plantarflexion MMT, Gross Movement  (3/5) fair  -KH    Lt Ankle Dorsiflexion MMT, Gross Movement  (2-/5) poor minus  -KH    Lt Lower Extremity Comments   Patient wears B AFOs- PMH of Charot Saloin Foot  -       Sensation    Sensation WNL?  WFL  -    Additional Comments  mild tingling L UE  -KH       Transfers    Comment (Transfers)  unable to stand without reliance on UE support  -       Gait/Stairs Assessment/Training    Comment (Gait/Stairs)  ambulates with two walking sticks, wide PERRY, forward trunk flexion, decreased B step length, mild increased lateral trunk sway  -      User Key  (r) = Recorded By, (t) = Taken By, (c) = Cosigned By    Initials Name Provider Type    Ami Colon, PT Physical Therapist          Therapy Education  Given: HEP  Program: New  How Provided: Verbal, Demonstration  Provided to: Patient  Level of Understanding: Teach back education performed     PT OP Goals     Row Name 01/09/19 1500          PT Short Term Goals    STG 1  Patient will report reduction in pain for 12-24 hours post aquatic therapy  -     STG 2  Patient will perform sit to stand from pool bench without use of hands to increase functional strength  -     STG 3  Patient will increase L shoulder flexion AROM to 120 degrees or greater to increase ease with overhead tasks  -        Long Term Goals    LTG 1  Patient will perform sit to stand from chair on land without use of hands to increase functional strength  -     LTG 2  Patient will increase L shoulder flex strength to 4/5 with MMT to  increase ease with ADLs  -     LTG 3  Patient will demonstrate independence with advanced aquatic HEP to facilitate independent management of condition  -     LTG 4  Patient will improve score on LEFS by 15% or greater to improve quality of life (80% disability at eval)  -       User Key  (r) = Recorded By, (t) = Taken By, (c) = Cosigned By    Initials Name Provider Type    Ami Colon, PT Physical Therapist          PT Assessment/Plan     Row Name 01/09/19 1547          PT Assessment    Functional Limitations  Decreased safety during functional activities;Limitations in functional capacity and performance;Impaired gait;Limitations in community activities;Performance in leisure activities;Limitation in home management  -     Impairments  Pain;Range of motion;Muscle strength;Posture;Joint integrity;Motor function;Balance  -     Assessment Comments  Martha Davey is a 58 y.o. year-old female referred to physical therapy for bone metastases, fracture of left tibia, fracture of sixth thoracic vertebae, R knee OA, and L shoulder OA. She presents with evolving clinical presentation.  She is currently receiving chemotherapy treatments which may affect her progress in the plan of care. Significant PMH includes spinal cord compression from her thoracic fracture which she rehabilitated at [facility]. Signs and symptoms are consistent with physical therapy diagnosis of impaired functional mobility and endurance. Upon physical examination patient presents with decreased L shoulder AROM, and impaired functional strength. Patient is appropriate for skilled aqutic therapy in order to increase ease and safety with daily mobility. Plan on transition to land PT once patient is independent with aquatic program  -BRADLY     Please refer to paper survey for additional self-reported information  Yes  -BRADLY     Rehab Potential  Good  -BRADLY     Patient/caregiver participated in establishment of treatment plan and goals  Yes  -BRADLY      Patient would benefit from skilled therapy intervention  Yes  -KH        PT Plan    PT Frequency  2x/week  -     Predicted Duration of Therapy Intervention (Therapy Eval)  6-7 weeks  -     Planned CPT's?  PT RE-EVAL: 30984;PT EVAL LOW COMPLEXITY: 50187;PT MANUAL THERAPY EA 15 MIN: 23580;PT HOT OR COLD PACK TREAT MCARE;PT ULTRASOUND EA 15 MIN: 82937;PT ELECTRICAL STIM UNATTEND: ;PT AQUATIC THERAPY EA 15 MIN: 34418;PT NEUROMUSC RE-EDUCATION EA 15 MIN: 64370;PT THER PROC EA 15 MIN: 60353;PT THER ACT EA 15 MIN: 32160;PT GAIT TRAINING EA 15 MIN: 54981;PT SELF CARE/HOME MGMT/TRAIN EA 15: 77080;PT ELECTRICAL STIM ATTD EA 15 MIN: 51481  -     Physical Therapy Interventions (Optional Details)  aquatics exercise;patient/family education;postural re-education;ROM (Range of Motion);stair training;modalities;manual therapy techniques;stretching;strengthening;gross motor skills;home exercise program;gait training;balance training;lumbar stabilization  -     PT Plan Comments  LE/UE strength and ROM, core stability, balance  -       User Key  (r) = Recorded By, (t) = Taken By, (c) = Cosigned By    Initials Name Provider Type    Ami Colon, PT Physical Therapist            Exercises     Row Name 01/09/19 1500             Subjective Comments    Subjective Comments  I have primary issues with my left shoulder, right knee, and mid back  -         Subjective Pain    Able to rate subjective pain?  yes  -KH      Pre-Treatment Pain Level  7  -KH      Post-Treatment Pain Level  7  -KH         Total Minutes    01158 - PT Therapeutic Exercise Minutes  10  -KH         Exercise 1    Exercise Name 1  PPT  -KH      Reps 1  10  -KH      Time 1  10 sec  -KH         Exercise 2    Exercise Name 2  Cane AAROM flexion  -      Reps 2  10   -KH      Time 2  10 sec  -KH        User Key  (r) = Recorded By, (t) = Taken By, (c) = Cosigned By    Initials Name Provider Type    Ami Colon, PT Physical Therapist           Outcome  Measure Options: Lower Extremity Functional Scale (LEFS), Other Outcome Measure  Lower Extremity Functional Index  Any of your usual work, housework or school activities: Moderate difficulty  Your usual hobbies, recreational or sporting activities: Extreme difficulty or unable to perform activity  Getting into or out of the bath: A little bit of difficulty  Walking between rooms: Moderate difficulty  Putting on your shoes or socks: Quite a bit of difficulty  Squatting: Extreme difficulty or unable to perform activity  Lifting an object, like a bag of groceries from the floor: Extreme difficulty or unable to perform activity  Performing light activities around your home: Moderate difficulty  Performing heavy activities around your home: Extreme difficulty or unable to perform activity  Getting into or out of a car: Moderate difficulty  Walking 2 blocks: Quite a bit of difficulty  Walking a mile: Extreme difficulty or unable to perform activity  Going up or down 10 stairs (about 1 flight of stairs): Quite a bit of difficulty  Standing for 1 hour: Quite a bit of difficulty  Sitting for 1 hour: Quite a bit of difficulty  Running on even ground: Extreme difficulty or unable to perform activity  Running on uneven ground: Extreme difficulty or unable to perform activity  Making sharp turns while running fast: Extreme difficulty or unable to perform activity  Hopping: Extreme difficulty or unable to perform activity  Rolling over in bed: Extreme difficulty or unable to perform activity  Total: 16  Other Outcome Measure Tool Used  Other Outcome Measure Tool Comments: SPADI: 102/130=78% disability      Time Calculation:     Therapy Suggested Charges     Code   Minutes Charges    84439 (CPT®)  Pt Neuromusc Re Education Ea 15 Min      57380 (CPT®) Hc Pt Ther Proc Ea 15 Min 10 1    18334 (CPT®) Hc Gait Training Ea 15 Min      32277 (CPT®) Hc Pt Therapeutic Act Ea 15 Min      74709 (CPT®) Hc Pt Manual Therapy Ea 15 Min       28978 (CPT®) Hc Pt Ther Massage- Per 15 Min      21121 (CPT®) Hc Pt Iontophoresis Ea 15 Min      05663 (CPT®) Hc Pt Elec Stim Ea-Per 15 Min      53553 (CPT®) Hc Pt Ultrasound Ea 15 Min      89787 (CPT®) Hc Pt Self Care/Mgmt/Train Ea 15 Min      34135 (CPT®) Hc Pt Prosthetic (S) Train Initial Encounter, Each 15 Min      89132 (CPT®) Hc Orthotic(S) Mgmt/Train Initial Encounter, Each 15min      92486 (CPT®) Hc Pt Aquatic Therapy Ea 15 Min      26445 (CPT®) Hc Pt Orthotic(S)/Prosthetic(S) Encounter, Each 15 Min       (CPT®) Hc Pt Electrical Stim Unattended      Total  10 1          Start Time: 1515  Stop Time: 1555  Time Calculation (min): 40 min     Therapy Charges for Today     Code Description Service Date Service Provider Modifiers Qty    73148229428 HC PT THER PROC EA 15 MIN 1/9/2019 Ami Graham, PT GP 1    40464872569 HC PT EVAL LOW COMPLEXITY 2 1/9/2019 Ami Graham, PT GP 1          PT G-Codes  Outcome Measure Options: Lower Extremity Functional Scale (LEFS), Other Outcome Measure  Total: 16         Ami Graham, PT  1/9/2019

## 2019-01-14 ENCOUNTER — HOSPITAL ENCOUNTER (OUTPATIENT)
Dept: PHYSICAL THERAPY | Facility: HOSPITAL | Age: 59
Setting detail: THERAPIES SERIES
Discharge: HOME | End: 2019-01-14

## 2019-01-14 ENCOUNTER — DOCUMENTATION (OUTPATIENT)
Dept: ONCOLOGY | Facility: CLINIC | Age: 59
End: 2019-01-14

## 2019-01-14 DIAGNOSIS — Z74.09 IMPAIRED MOBILITY AND ENDURANCE: Primary | ICD-10-CM

## 2019-01-14 DIAGNOSIS — R53.1 GENERALIZED WEAKNESS: ICD-10-CM

## 2019-01-14 PROCEDURE — 97113 AQUATIC THERAPY/EXERCISES: CPT | Performed by: PHYSICAL THERAPIST

## 2019-01-14 NOTE — THERAPY TREATMENT NOTE
Outpatient Physical Therapy Ortho Treatment Note  [facility]     Patient Name: Martha Davey  : 1960  MRN: [id number]  Today's Date: 2019      Visit Date: 2019    Visit Dx:    ICD-10-CM ICD-9-CM   1. Impaired mobility and endurance Z74.09 V49.89   2. Generalized weakness R53.1 780.79       Patient Active Problem List   Diagnosis   • Malignant neoplasm of overlapping sites of right breast in female, estrogen receptor negative (CMS/HCC)   • Hematuria   • Hyperlipidemia   • Hypertension   • Hereditary sensorimotor neuropathy   • Hyperglycemia   • MARIA M on CPAP   • Gastroesophageal reflux disease   • Colon polyp   • Diverticulitis of colon with perforation   • Foot pain   • Osteoarthritis of knee   • Leukocytosis   • Osteoarthritis of shoulder   • Senile osteopenia   • Long term current use of aromatase inhibitor   • Chronic right-sided thoracic back pain   • Closed fracture of left fibula and tibia   • Closed displaced spiral fracture of shaft of left tibia   • Cancer associated pain   • Cord compression (CMS/HCC)   • Drug induced constipation   • Closed T6 spinal fracture (CMS/HCC)   • History of pulmonary embolism   • S/P ORIF (open reduction internal fixation) fracture   • Bone metastases (CMS/HCC)   • Surgery follow-up examination   • Dysuria   • Hypercalcemia of malignancy   • Pericardial effusion   • Other fatigue        Past Medical History:   Diagnosis Date   • Acute pulmonary embolism, cancer-related 10/2017   • Allergic rhinitis    • Arthritis     Right knee; left shoulder   • Cancer (CMS/HCC) 2010    Right breast   • Cancer (CMS/HCC) 10/2017    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   • Charcot-Saloni-Tooth disease    • CPAP (continuous positive airway pressure) dependence    • GERD (gastroesophageal reflux disease)    • H/O Colon polyps    • H/O Uterine fibroid    • Heart murmur    • Hyperlipidemia    • Hypertension    • PONV (postoperative nausea and vomiting)         Past Surgical  History:   Procedure Laterality Date   • BLADDER SURGERY      Bladder lift   • BREAST RECONSTRUCTION, BREAST TISSUE EXPANDER INSERTION Right 2010   • BREAST SURGERY Right 2010    Mastectomy   •  SECTION  ,    • CHOLECYSTECTOMY     • COLONOSCOPY     • HERNIA REPAIR  2015    [facility]   • HYSTERECTOMY      Partial   • KNEE SURGERY Right    • LEG SURGERY Left     Broken   • MASTECTOMY Right    • MO TREAT TIBIAL SHAFT FX, INTRAMED IMPLANT Left 2017    Procedure: TIBIA INTRAMEDULLARY NAIL/DON INSERTION;  Surgeon: Romero Shanks MD;  Location: [facility];  Service: Orthopedics   • THORACIC DECOMPRESSION POSTERIOR FUSION WITH INSTRUMENTATION N/A 2017    Procedure: T6 costotransversectomy and decompression with T3 to  T9 posterior spinal fusion with instrumentation with Jennifer Gonzalez and Nael Dennis Cellsaver;  Surgeon: Quan Nayak MD;  Location: [facility];  Service:                        PT Assessment/Plan     Row Name 19 1401          PT Assessment    Assessment Comments  First session in aquatic environment.  Ms. Davey presents with B AFOS and B walking sticks. Unable to descend stairs thus she requested to scoot down on her bottom. Assistance required to walk in pool secondary to balance deficits. Stair ascent for exit with moderate assist and non reciprocal technique.   -CK       User Key  (r) = Recorded By, (t) = Taken By, (c) = Cosigned By    Initials Name Provider Type    CK You Bunn, PT Physical Therapist              Exercises     Row Name 19 1400             Subjective Comments    Subjective Comments  No changes since evaluation.   -CK         Subjective Pain    Able to rate subjective pain?  yes  -CK      Pre-Treatment Pain Level  7  -CK      Post-Treatment Pain Level  7  -CK         Aquatics    Aquatics performed?  Yes  -CK         Aquatics LE    Water Walk  forward;backward;side x 4laps -15” holding rail  -CK      Stretch Other 1  B leg  press x 15, SN  -CK      Stretch Other 2  sit to stand from bench x 10* gluteal squeeze at top  -CK      Abdominals  noodle x12 SN feet apart  -CK      Hip Abd/Add  B x15  -CK      March in Place  walking at rail x 4 laps   -CK      Toe/Heel Raises  15 at bar  -CK      Uni-Squat  B hip circles cw/ccw 10/10  -CK      Uni-Clock  LAQ, B x 15  -CK      Step Ups  fwd, 8’ B x 12 BUE support  -CK      Bicycle  seated at bench x 2 min  -CK      Flutter/Scissor  seated at bench x 2 min  -CK         Exercise 1    Exercise Name 1  step down/return-8”  -CK      Reps 1  10 B  -CK        User Key  (r) = Recorded By, (t) = Taken By, (c) = Cosigned By    Initials Name Provider Type    CK You Bunn S, PT Physical Therapist                         PT OP Goals     Row Name 01/14/19 1400          PT Short Term Goals    STG 1  Patient will report reduction in pain for 12-24 hours post aquatic therapy  -CK     STG 1 Progress  Ongoing  -CK     STG 2  Patient will perform sit to stand from pool bench without use of hands to increase functional strength  -CK     STG 2 Progress  Ongoing  -CK     STG 2 Progress Comments  initiated work in pool  -CK     STG 3  Patient will increase L shoulder flexion AROM to 120 degrees or greater to increase ease with overhead tasks  -CK     STG 3 Progress  Ongoing  -CK        Long Term Goals    LTG 1  Patient will perform sit to stand from chair on land without use of hands to increase functional strength  -CK     LTG 1 Progress  Ongoing  -CK     LTG 2  Patient will increase L shoulder flex strength to 4/5 with MMT to increase ease with ADLs  -CK     LTG 2 Progress  Ongoing  -CK     LTG 3  Patient will demonstrate independence with advanced aquatic HEP to facilitate independent management of condition  -CK     LTG 3 Progress  Ongoing  -CK     LTG 4  Patient will improve score on LEFS by 15% or greater to improve quality of life (80% disability at eval)  -CK     LTG 4 Progress  Ongoing  -CK       User Key   (r) = Recorded By, (t) = Taken By, (c) = Cosigned By    Initials Name Provider Type    CK You Bunn, PT Physical Therapist                         Time Calculation:   Start Time: 1345  Stop Time: 1430  Time Calculation (min): 45 min  Total Timed Code Minutes- PT: 45 minute(s)  Therapy Suggested Charges     Code   Minutes Charges    None           Therapy Charges for Today     Code Description Service Date Service Provider Modifiers Qty    21978120988 HC PT AQUATIC THERAPY EA 15 MIN 1/14/2019 You Bunn, PT GP 3                    You Bunn, PT  1/14/2019

## 2019-01-16 ENCOUNTER — HOSPITAL ENCOUNTER (OUTPATIENT)
Dept: PHYSICAL THERAPY | Facility: HOSPITAL | Age: 59
Setting detail: THERAPIES SERIES
Discharge: HOME | End: 2019-01-16

## 2019-01-16 DIAGNOSIS — R53.1 GENERALIZED WEAKNESS: ICD-10-CM

## 2019-01-16 DIAGNOSIS — Z74.09 IMPAIRED MOBILITY AND ENDURANCE: Primary | ICD-10-CM

## 2019-01-16 PROCEDURE — 97113 AQUATIC THERAPY/EXERCISES: CPT | Performed by: PHYSICAL THERAPIST

## 2019-01-16 NOTE — THERAPY TREATMENT NOTE
Outpatient Physical Therapy Ortho Treatment Note  [facility]     Patient Name: Martha Davey  : 1960  MRN: [id number]  Today's Date: 2019      Visit Date: 2019    Visit Dx:    ICD-10-CM ICD-9-CM   1. Impaired mobility and endurance Z74.09 V49.89   2. Generalized weakness R53.1 780.79       Patient Active Problem List   Diagnosis   • Malignant neoplasm of overlapping sites of right breast in female, estrogen receptor negative (CMS/HCC)   • Hematuria   • Hyperlipidemia   • Hypertension   • Hereditary sensorimotor neuropathy   • Hyperglycemia   • MARIA M on CPAP   • Gastroesophageal reflux disease   • Colon polyp   • Diverticulitis of colon with perforation   • Foot pain   • Osteoarthritis of knee   • Leukocytosis   • Osteoarthritis of shoulder   • Senile osteopenia   • Long term current use of aromatase inhibitor   • Chronic right-sided thoracic back pain   • Closed fracture of left fibula and tibia   • Closed displaced spiral fracture of shaft of left tibia   • Cancer associated pain   • Cord compression (CMS/HCC)   • Drug induced constipation   • Closed T6 spinal fracture (CMS/HCC)   • History of pulmonary embolism   • S/P ORIF (open reduction internal fixation) fracture   • Bone metastases (CMS/HCC)   • Surgery follow-up examination   • Dysuria   • Hypercalcemia of malignancy   • Pericardial effusion   • Other fatigue        Past Medical History:   Diagnosis Date   • Acute pulmonary embolism, cancer-related 10/2017   • Allergic rhinitis    • Arthritis     Right knee; left shoulder   • Cancer (CMS/HCC) 2010    Right breast   • Cancer (CMS/HCC) 10/2017    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   • Charcot-Saloni-Tooth disease    • CPAP (continuous positive airway pressure) dependence    • GERD (gastroesophageal reflux disease)    • H/O Colon polyps    • H/O Uterine fibroid    • Heart murmur    • Hyperlipidemia    • Hypertension    • PONV (postoperative nausea and vomiting)         Past Surgical  History:   Procedure Laterality Date   • BLADDER SURGERY      Bladder lift   • BREAST RECONSTRUCTION, BREAST TISSUE EXPANDER INSERTION Right 2010   • BREAST SURGERY Right 2010    Mastectomy   •  SECTION  ,    • CHOLECYSTECTOMY     • COLONOSCOPY     • HERNIA REPAIR  2015    Ventral   • HYSTERECTOMY      Partial   • KNEE SURGERY Right    • LEG SURGERY Left     Broken   • MASTECTOMY Right    • NE TREAT TIBIAL SHAFT FX, INTRAMED IMPLANT Left 2017    Procedure: TIBIA INTRAMEDULLARY NAIL/DON INSERTION;  Surgeon: Romero Shanks MD;  Location: [facility];  Service: Orthopedics   • THORACIC DECOMPRESSION POSTERIOR FUSION WITH INSTRUMENTATION N/A 2017    Procedure: T6 costotransversectomy and decompression with T3 to  T9 posterior spinal fusion with instrumentation with Jennifer Gonzalez and Nael Dennis [employer];  Surgeon: Quan Nayak MD;  Location: [facility];  Service:        PT Ortho     Row Name 19 1300       Subjective Comments    Subjective Comments  Not sore after last treatment  -JS       Subjective Pain    Able to rate subjective pain?  yes  -JS    Pre-Treatment Pain Level  0  -JS    Post-Treatment Pain Level  0  -JS      User Key  (r) = Recorded By, (t) = Taken By, (c) = Cosigned By    Initials Name Provider Type    Jazmine Metz, PT Physical Therapist                      PT Assessment/Plan     Row Name 19 1300          PT Assessment    Assessment Comments  Pt responding well to initial aquatic therapy appointments with no increased pain following treatment session, which focused on ambulation, balance, core/LE stabilization & strengthening.  Pt ambulates to pool area with B AFOs and B walking sticks, continued entrance/exit as on first visit (scooting down steps to enter, ascending stairs one at a time with min-mod assist to exit).  Requires close supervision ambulating in water to get to side of the pool where railing is present, demonstrating high  steppage gait due to lack of ankle DF and decreased balance. Benefits to continue treatment in the aquatic setting.   -JS        PT Plan    PT Plan Comments  Continue aquatic PT.  -JS       User Key  (r) = Recorded By, (t) = Taken By, (c) = Cosigned By    Initials Name Provider Type    Jazmine Metz PT Physical Therapist              Exercises     Row Name 01/16/19 1300             Subjective Comments    Subjective Comments  Not sore after last treatment  -JS         Subjective Pain    Able to rate subjective pain?  yes  -JS      Pre-Treatment Pain Level  0  -JS      Post-Treatment Pain Level  0  -JS         Aquatics    Aquatics performed?  Yes  -JS      90654 - Aquatic Therapy Minutes  45  -JS         Aquatics LE    Water Walk  forward;backward;side at railing, 4 laps of 15' with SBA  -JS      Stretch Other 1  B leg press x 15, SN  -JS      Stretch Other 2  sit to stand from bench x 10* gluteal squeeze at top use of LN in front to facilitate anterior weight shift  -JS      Abdominals  noodle x12 with SN, wide PERRY  -JS      Hip Abd/Add  B x15  -JS      March in Place  walking at rail x 4 laps of 15'   -JS      Toe/Heel Raises  15 at bar  -JS      Uni-Squat  B hip circles cw/ccw 10/10  -JS      Uni-Clock  LAQ, B x 15  -JS      Step Ups  fwd, 8’ B x 12 BUE support  -JS      Bicycle  seated at bench x 2 min  -JS      Flutter/Scissor  seated at bench x 2 min  -JS        User Key  (r) = Recorded By, (t) = Taken By, (c) = Cosigned By    Initials Name Provider Type    Jazmine Metz PT Physical Therapist                             Therapy Education  Education Details: Review general benefits/properties of water & reviewed initial aquatic ex with intermittent cueing for core stabilization.  Emphasized importance of safety with entrance/exit from pool & care with ambulating into/out of pool area  Given: Fall prevention and home safety, Symptoms/condition management, Posture/body mechanics  Program: Reinforced  How  Provided: Verbal  Provided to: Patient  Level of Understanding: Teach back education performed, Verbalized              Time Calculation:   Start Time: 1300  Stop Time: 1345  Time Calculation (min): 45 min  Therapy Suggested Charges     Code   Minutes Charges    39159 (CPT®) Hc Pt Neuromusc Re Education Ea 15 Min      02326 (CPT®) Hc Pt Ther Proc Ea 15 Min      78285 (CPT®) Hc Gait Training Ea 15 Min      85833 (CPT®) Hc Pt Therapeutic Act Ea 15 Min      19181 (CPT®) Hc Pt Manual Therapy Ea 15 Min      40484 (CPT®) Hc Pt Ther Massage- Per 15 Min      97347 (CPT®) Hc Pt Iontophoresis Ea 15 Min      26308 (CPT®) Hc Pt Elec Stim Ea-Per 15 Min      39910 (CPT®) Hc Pt Ultrasound Ea 15 Min      07984 (CPT®) Hc Pt Self Care/Mgmt/Train Ea 15 Min      10040 (CPT®) Hc Pt Prosthetic (S) Train Initial Encounter, Each 15 Min      91455 (CPT®) Hc Orthotic(S) Mgmt/Train Initial Encounter, Each 15min      46967 (CPT®) Hc Pt Aquatic Therapy Ea 15 Min 45 3    15894 (CPT®) Hc Pt Orthotic(S)/Prosthetic(S) Encounter, Each 15 Min       (CPT®) Hc Pt Electrical Stim Unattended      Total  45 3        Therapy Charges for Today     Code Description Service Date Service Provider Modifiers Qty    12649234233 HC PT AQUATIC THERAPY EA 15 MIN 1/16/2019 Jazmine Flowers, PT GP 3                    Jazmine Flowers, PT  1/16/2019

## 2019-01-21 ENCOUNTER — HOSPITAL ENCOUNTER (OUTPATIENT)
Dept: CT IMAGING | Facility: HOSPITAL | Age: 59
Discharge: HOME | End: 2019-01-21
Attending: INTERNAL MEDICINE | Admitting: INTERNAL MEDICINE

## 2019-01-21 ENCOUNTER — HOSPITAL ENCOUNTER (OUTPATIENT)
Dept: NUCLEAR MEDICINE | Facility: HOSPITAL | Age: 59
Discharge: HOME | End: 2019-01-21
Attending: INTERNAL MEDICINE

## 2019-01-21 DIAGNOSIS — Z17.1 MALIGNANT NEOPLASM OF OVERLAPPING SITES OF RIGHT BREAST IN FEMALE, ESTROGEN RECEPTOR NEGATIVE (HCC): ICD-10-CM

## 2019-01-21 DIAGNOSIS — C50.811 MALIGNANT NEOPLASM OF OVERLAPPING SITES OF RIGHT BREAST IN FEMALE, ESTROGEN RECEPTOR NEGATIVE (HCC): ICD-10-CM

## 2019-01-21 LAB — CREAT BLDA-MCNC: 0.9 MG/DL (ref 0.6–1.3)

## 2019-01-21 PROCEDURE — 82565 ASSAY OF CREATININE: CPT

## 2019-01-21 PROCEDURE — 78306 BONE IMAGING WHOLE BODY: CPT

## 2019-01-21 PROCEDURE — 74177 CT ABD & PELVIS W/CONTRAST: CPT

## 2019-01-21 PROCEDURE — A9503 TC99M MEDRONATE: HCPCS | Performed by: INTERNAL MEDICINE

## 2019-01-21 PROCEDURE — 0 TECHNETIUM MEDRONATE KIT: Performed by: INTERNAL MEDICINE

## 2019-01-21 PROCEDURE — 25010000002 IOPAMIDOL 61 % SOLUTION: Performed by: INTERNAL MEDICINE

## 2019-01-21 PROCEDURE — 71260 CT THORAX DX C+: CPT

## 2019-01-21 RX ORDER — TC 99M MEDRONATE 20 MG/10ML
18.4 INJECTION, POWDER, LYOPHILIZED, FOR SOLUTION INTRAVENOUS
Status: COMPLETED | OUTPATIENT
Start: 2019-01-21 | End: 2019-01-21

## 2019-01-21 RX ADMIN — Medication 18.4 MILLICURIE: at 11:34

## 2019-01-21 RX ADMIN — IOPAMIDOL 85 ML: 612 INJECTION, SOLUTION INTRAVENOUS at 12:30

## 2019-01-23 ENCOUNTER — APPOINTMENT (OUTPATIENT)
Dept: PHYSICAL THERAPY | Facility: HOSPITAL | Age: 59
End: 2019-01-23

## 2019-01-28 ENCOUNTER — OFFICE VISIT (OUTPATIENT)
Dept: ONCOLOGY | Facility: CLINIC | Age: 59
End: 2019-01-28

## 2019-01-28 ENCOUNTER — HOSPITAL ENCOUNTER (OUTPATIENT)
Dept: PHYSICAL THERAPY | Facility: HOSPITAL | Age: 59
Setting detail: THERAPIES SERIES
Discharge: HOME | End: 2019-01-28

## 2019-01-28 ENCOUNTER — LAB (OUTPATIENT)
Dept: OTHER | Facility: HOSPITAL | Age: 59
End: 2019-01-28

## 2019-01-28 ENCOUNTER — DOCUMENTATION (OUTPATIENT)
Dept: ONCOLOGY | Facility: CLINIC | Age: 59
End: 2019-01-28

## 2019-01-28 ENCOUNTER — INFUSION (OUTPATIENT)
Dept: ONCOLOGY | Facility: HOSPITAL | Age: 59
End: 2019-01-28

## 2019-01-28 VITALS
OXYGEN SATURATION: 97 % | DIASTOLIC BLOOD PRESSURE: 83 MMHG | RESPIRATION RATE: 16 BRPM | SYSTOLIC BLOOD PRESSURE: 128 MMHG | WEIGHT: 194.7 LBS | HEIGHT: 61 IN | TEMPERATURE: 98 F | BODY MASS INDEX: 36.76 KG/M2 | HEART RATE: 96 BPM

## 2019-01-28 DIAGNOSIS — Z74.09 IMPAIRED MOBILITY AND ENDURANCE: Primary | ICD-10-CM

## 2019-01-28 DIAGNOSIS — R53.1 GENERALIZED WEAKNESS: ICD-10-CM

## 2019-01-28 DIAGNOSIS — C50.811 MALIGNANT NEOPLASM OF OVERLAPPING SITES OF RIGHT BREAST IN FEMALE, ESTROGEN RECEPTOR NEGATIVE (HCC): Primary | ICD-10-CM

## 2019-01-28 DIAGNOSIS — Z17.1 MALIGNANT NEOPLASM OF OVERLAPPING SITES OF RIGHT BREAST IN FEMALE, ESTROGEN RECEPTOR NEGATIVE (HCC): Primary | ICD-10-CM

## 2019-01-28 DIAGNOSIS — C79.51 BONE METASTASES: Primary | ICD-10-CM

## 2019-01-28 DIAGNOSIS — C50.811 MALIGNANT NEOPLASM OF OVERLAPPING SITES OF RIGHT BREAST IN FEMALE, ESTROGEN RECEPTOR NEGATIVE (HCC): ICD-10-CM

## 2019-01-28 DIAGNOSIS — Z17.1 MALIGNANT NEOPLASM OF OVERLAPPING SITES OF RIGHT BREAST IN FEMALE, ESTROGEN RECEPTOR NEGATIVE (HCC): ICD-10-CM

## 2019-01-28 DIAGNOSIS — R53.83 OTHER FATIGUE: ICD-10-CM

## 2019-01-28 LAB
ALBUMIN SERPL-MCNC: 4.3 G/DL (ref 3.5–5.2)
ALBUMIN/GLOB SERPL: 1.7 G/DL
ALP SERPL-CCNC: 70 U/L (ref 39–117)
ALT SERPL W P-5'-P-CCNC: 31 U/L (ref 1–33)
ANION GAP SERPL CALCULATED.3IONS-SCNC: 10.1 MMOL/L
AST SERPL-CCNC: 21 U/L (ref 1–32)
BASOPHILS # BLD AUTO: 0.02 10*3/MM3 (ref 0–0.2)
BASOPHILS NFR BLD AUTO: 0.5 % (ref 0–1.5)
BILIRUB SERPL-MCNC: 0.3 MG/DL (ref 0.1–1.2)
BUN BLD-MCNC: 19 MG/DL (ref 6–20)
BUN/CREAT SERPL: 21.1 (ref 7–25)
CALCIUM SPEC-SCNC: 9.2 MG/DL (ref 8.6–10.5)
CHLORIDE SERPL-SCNC: 103 MMOL/L (ref 98–107)
CO2 SERPL-SCNC: 25.9 MMOL/L (ref 22–29)
CREAT BLD-MCNC: 0.9 MG/DL (ref 0.57–1)
DEPRECATED RDW RBC AUTO: 65.2 FL (ref 37–54)
EOSINOPHIL # BLD AUTO: 0.12 10*3/MM3 (ref 0–0.7)
EOSINOPHIL NFR BLD AUTO: 3.2 % (ref 0.3–6.2)
ERYTHROCYTE [DISTWIDTH] IN BLOOD BY AUTOMATED COUNT: 18.7 % (ref 11.7–13)
GFR SERPL CREATININE-BSD FRML MDRD: 64 ML/MIN/1.73
GLOBULIN UR ELPH-MCNC: 2.6 GM/DL
GLUCOSE BLD-MCNC: 106 MG/DL (ref 65–99)
HCT VFR BLD AUTO: 39.2 % (ref 35.6–45.5)
HGB BLD-MCNC: 13 G/DL (ref 11.9–15.5)
IMM GRANULOCYTES # BLD AUTO: 0.02 10*3/MM3 (ref 0–0.03)
IMM GRANULOCYTES NFR BLD AUTO: 0.5 % (ref 0–0.5)
LYMPHOCYTES # BLD AUTO: 0.96 10*3/MM3 (ref 0.9–4.8)
LYMPHOCYTES NFR BLD AUTO: 25.4 % (ref 19.6–45.3)
MAGNESIUM SERPL-MCNC: 1.9 MG/DL (ref 1.6–2.6)
MCH RBC QN AUTO: 31.9 PG (ref 26.9–32)
MCHC RBC AUTO-ENTMCNC: 33.2 G/DL (ref 32.4–36.3)
MCV RBC AUTO: 96.3 FL (ref 80.5–98.2)
MONOCYTES # BLD AUTO: 0.45 10*3/MM3 (ref 0.2–1.2)
MONOCYTES NFR BLD AUTO: 11.9 % (ref 5–12)
NEUTROPHILS # BLD AUTO: 2.21 10*3/MM3 (ref 1.9–8.1)
NEUTROPHILS NFR BLD AUTO: 58.5 % (ref 42.7–76)
NRBC BLD AUTO-RTO: 0 /100 WBC (ref 0–0)
PHOSPHATE SERPL-MCNC: 3.2 MG/DL (ref 2.5–4.5)
PLATELET # BLD AUTO: 240 10*3/MM3 (ref 140–500)
PMV BLD AUTO: 10.9 FL (ref 6–12)
POTASSIUM BLD-SCNC: 4.4 MMOL/L (ref 3.5–5.2)
PROT SERPL-MCNC: 6.9 G/DL (ref 6–8.5)
RBC # BLD AUTO: 4.07 10*6/MM3 (ref 3.9–5.2)
SODIUM BLD-SCNC: 139 MMOL/L (ref 136–145)
WBC NRBC COR # BLD: 3.78 10*3/MM3 (ref 4.5–10.7)

## 2019-01-28 PROCEDURE — 99214 OFFICE O/P EST MOD 30 MIN: CPT | Performed by: INTERNAL MEDICINE

## 2019-01-28 PROCEDURE — 25010000002 DENOSUMAB 120 MG/1.7ML SOLUTION: Performed by: INTERNAL MEDICINE

## 2019-01-28 PROCEDURE — 80053 COMPREHEN METABOLIC PANEL: CPT | Performed by: INTERNAL MEDICINE

## 2019-01-28 PROCEDURE — 96372 THER/PROPH/DIAG INJ SC/IM: CPT | Performed by: INTERNAL MEDICINE

## 2019-01-28 PROCEDURE — 84100 ASSAY OF PHOSPHORUS: CPT | Performed by: INTERNAL MEDICINE

## 2019-01-28 PROCEDURE — 36415 COLL VENOUS BLD VENIPUNCTURE: CPT

## 2019-01-28 PROCEDURE — 83735 ASSAY OF MAGNESIUM: CPT | Performed by: INTERNAL MEDICINE

## 2019-01-28 PROCEDURE — 85025 COMPLETE CBC W/AUTO DIFF WBC: CPT | Performed by: INTERNAL MEDICINE

## 2019-01-28 PROCEDURE — 97113 AQUATIC THERAPY/EXERCISES: CPT | Performed by: PHYSICAL THERAPIST

## 2019-01-28 RX ADMIN — DENOSUMAB 120 MG: 120 INJECTION SUBCUTANEOUS at 11:40

## 2019-01-28 NOTE — THERAPY TREATMENT NOTE
Outpatient Physical Therapy Ortho Treatment Note  [facility]     Patient Name: Martha Davey  : 1960  MRN: [id number]  Today's Date: 2019      Visit Date: 2019    Visit Dx:    ICD-10-CM ICD-9-CM   1. Impaired mobility and endurance Z74.09 V49.89   2. Generalized weakness R53.1 780.79       Patient Active Problem List   Diagnosis   • Malignant neoplasm of overlapping sites of right breast in female, estrogen receptor negative (CMS/HCC)   • Hematuria   • Hyperlipidemia   • Hypertension   • Hereditary sensorimotor neuropathy   • Hyperglycemia   • MARIA M on CPAP   • Gastroesophageal reflux disease   • Colon polyp   • Diverticulitis of colon with perforation   • Foot pain   • Osteoarthritis of knee   • Leukocytosis   • Osteoarthritis of shoulder   • Senile osteopenia   • Long term current use of aromatase inhibitor   • Chronic right-sided thoracic back pain   • Closed fracture of left fibula and tibia   • Closed displaced spiral fracture of shaft of left tibia   • Cancer associated pain   • Cord compression (CMS/HCC)   • Drug induced constipation   • Closed T6 spinal fracture (CMS/HCC)   • History of pulmonary embolism   • S/P ORIF (open reduction internal fixation) fracture   • Bone metastases (CMS/HCC)   • Surgery follow-up examination   • Dysuria   • Hypercalcemia of malignancy   • Pericardial effusion   • Other fatigue        Past Medical History:   Diagnosis Date   • Acute pulmonary embolism, cancer-related 10/2017   • Allergic rhinitis    • Arthritis     Right knee; left shoulder   • Cancer (CMS/HCC) 2010    Right breast   • Cancer (CMS/HCC) 10/2017    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   • Charcot-Saloni-Tooth disease    • CPAP (continuous positive airway pressure) dependence    • GERD (gastroesophageal reflux disease)    • H/O Colon polyps    • H/O Uterine fibroid    • Heart murmur    • Hyperlipidemia    • Hypertension    • PONV (postoperative nausea and vomiting)         Past Surgical  History:   Procedure Laterality Date   • BLADDER SURGERY      Bladder lift   • BREAST RECONSTRUCTION, BREAST TISSUE EXPANDER INSERTION Right 2010   • BREAST SURGERY Right 2010    Mastectomy   •  SECTION  ,    • CHOLECYSTECTOMY     • COLONOSCOPY     • HERNIA REPAIR  2015    Ventral   • HYSTERECTOMY      Partial   • KNEE SURGERY Right    • LEG SURGERY Left     Broken   • MASTECTOMY Right    • DC TREAT TIBIAL SHAFT FX, INTRAMED IMPLANT Left 2017    Procedure: TIBIA INTRAMEDULLARY NAIL/DON INSERTION;  Surgeon: Romero Shanks MD;  Location: [facility];  Service: Orthopedics   • THORACIC DECOMPRESSION POSTERIOR FUSION WITH INSTRUMENTATION N/A 2017    Procedure: T6 costotransversectomy and decompression with T3 to  T9 posterior spinal fusion with instrumentation with Jennifer Gonzalez [facility];  Surgeon: Quan Nayak MD;  Location: [facility];  Service:                        PT Assessment/Plan     Row Name 19 1437          PT Assessment    Assessment Comments  Attempting to challenge core/balance with UE activities. Wide PERRY needed with verbal cueing for gluteal activation for stabilization. Large noodle used for assist with balance during ambulation techniques across the pool.  Steppage gait pattern required to clear toes.   -CK       User Key  (r) = Recorded By, (t) = Taken By, (c) = Cosigned By    Initials Name Provider Type    CK You Bunn, PT Physical Therapist              Exercises     Row Name 19 1300             Subjective Comments    Subjective Comments  Been ok. Couldntcome last week due to my  having skin cancer removed.   -CK         Subjective Pain    Able to rate subjective pain?  yes  -CK      Pre-Treatment Pain Level  2  -CK      Post-Treatment Pain Level  2  -CK      Subjective Pain Comment  bottom of L foot  -CK         Aquatics    31290 - Aquatic Therapy Minutes  45  -CK         Aquatics LE    Water Walk   forward;side holding LN for support x 2 laps ea  -CK      Stretch 1  walking backwards at rail 10’ x 4  -CK      Stretch 2  kickboard push/pull x 15  -CK      Stretch 3  shldr abd/abd x 15 multicolor bouys  -CK      Stretch Other 1  B leg press x 15, SN  -CK      Stretch Other 2  sit to stand from bench x 10* gluteal squeeze at top use of LN in front to facilitate anterior weight shift  -CK      Abdominals  noodle;Other (comment) LN x 15, wide mundo  -CK      Hip Abd/Add  B x15  -CK      Mini Squat  15X *gluteal squeeze at top  -CK      Toe/Heel Raises  15 at bar  -CK      Uni-Squat  B hip circles cw/ccw 10/10  -CK      Uni-Clock  LAQ, B x 20 holding 10”  -CK      Step Ups  fwd, 8’ B x 12 BUE support  -CK      Bicycle  seated at bench x 2 min  -CK      Flutter/Scissor  HS curls, B x 20  -CK         Exercise 1    Exercise Name 1  alternating step taps-8” box  -CK      Reps 1  20  -CK      Additional Comments  light UE support  -CK        User Key  (r) = Recorded By, (t) = Taken By, (c) = Cosigned By    Initials Name Provider Type    CK You Bunn, PT Physical Therapist                                            Time Calculation:   Start Time: 1345  Stop Time: 1430  Time Calculation (min): 45 min  Total Timed Code Minutes- PT: 45 minute(s)  Therapy Suggested Charges     Code   Minutes Charges    00390 (CPT®) Hc Pt Neuromusc Re Education Ea 15 Min      06470 (CPT®) Hc Pt Ther Proc Ea 15 Min      71280 (CPT®) Hc Gait Training Ea 15 Min      77851 (CPT®) Hc Pt Therapeutic Act Ea 15 Min      95345 (CPT®) Hc Pt Manual Therapy Ea 15 Min      99983 (CPT®) Hc Pt Ther Massage- Per 15 Min      17346 (CPT®) Hc Pt Iontophoresis Ea 15 Min      10745 (CPT®) Hc Pt Elec Stim Ea-Per 15 Min      55123 (CPT®) Hc Pt Ultrasound Ea 15 Min      45121 (CPT®) Hc Pt Self Care/Mgmt/Train Ea 15 Min      05634 (CPT®) Hc Pt Prosthetic (S) Train Initial Encounter, Each 15 Min      77876 (CPT®) Hc Orthotic(S) Mgmt/Train Initial Encounter, Each  15min      78802 (CPT®) Hc Pt Aquatic Therapy Ea 15 Min 45 3    31802 (CPT®) Hc Pt Orthotic(S)/Prosthetic(S) Encounter, Each 15 Min       (CPT®) Hc Pt Electrical Stim Unattended      Total  45 3        Therapy Charges for Today     Code Description Service Date Service Provider Modifiers Qty    01431855554 HC PT AQUATIC THERAPY EA 15 MIN 1/28/2019 You Bunn, PT GP 3                    You Bunn, PT  1/28/2019

## 2019-01-28 NOTE — PROGRESS NOTES
VM rec from Dr Hancock stating he sent an rx for Eliquis for pt. He asks for me to make sure it goes through pharmacy smoothly. I contacted pts local [facility] [phone] and spoke to the Pharmacist. He states it was paid by the insurance and it left her with a copay of $60. He was going to refer the pt to the Eliquis website to get a card as she has a commercial rx plan. I told him I will activate a copay card for her and contact him back.    I have given the copay card information to the Pharmacist.

## 2019-01-28 NOTE — PROGRESS NOTES
Subjective .     REASONS FOR FOLLOWUP:    1. Stage Ib ([id number]) right breast cancer: Diagnosed May 2010 with excisional biopsy for invasive ductal carcinoma, 1.3 cm, grade 2, ER 90%, AZ 80%, HER-2/peter negative (1+ IHC).  Subsequent right mastectomy in July 2010 with no residual breast malignancy, 1/5 sentinel lymph node with micrometastasis (0.25 mm).  Treated in the [facility] system with adjuvant AC ×4 cycles in 2010 (no taxanes administered due to underlying Charcot-Saloni-Tooth with peripheral neuropathy).  Adjuvant Femara (postmenopausal) initiated October 2010 with plan to treat ×10 years.  Genetic testing reportedly negative.  Developed osteopenia treated with Prolia beginning 2/27/13.  2. Recurrent/metastatic disease identified 10/8/17 involving thoracic spine with cord compression at T6, lumbosacral involvement, sternal and right sternoclavicular involvement.  Femara discontinued.  Radiation administered (in the [facility] system) to the thoracic spine beginning 10/19/17 treating T3-T9 to a dose of 24 gray in 6 fractions.  3. Evaluation with MRI 12/8/17 showing persistent T6 cord compression with persistent neurologic compromise requiring surgical treatment 12/11/17 with T6 laminectomy/corpectomy and T3-T9 fusion.  Pathology with metastatic carcinoma of breast origin, ER negative, AZ negative, HER-2/peter negative (1+ IHC).  4. Right pulmonary embolism 10/21/17 treated with Lovenox 1 mg/kg (100 mg) every 12 hours.  No evidence of DVT.  Lovenox held due to right gluteus hematoma 3/23/18 through 4/6/18.  Transitioned to oral Eliquis 5mg bid 1/28/19.  5. Cancer related pain previously on Duragesic 50 µg patch every 72 hours and Dilaudid 4 mg as needed for breakthrough pain.  Narcotics subsequently discontinued with improvement in pain in January 2018.  6. Radiation therapy to L3 dural metastasis and left humerus initiated 1/15/18 (10 fractions), completed 1/26/18  7. Monthly Xgeva initiated 1/23/18  8. Mild  hypercalcemia of malignancy  9. Initiation of palliative oral single agent Xeloda 2/7/18 (2000 mg a.m., 1500 mg p.m. for 14/21 days).  10. 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  In reviewing prior CT scans, has been present for some time.  Monitor for now, if it enlarges consider surgical excision.  11. Cumulative side effects from Xeloda with fatigue, anorexia, diarrhea, increased tearing.  Alteration in dose/schedule with cycle 11 to 1500 mg twice a day for 7 days on followed by 7 days off.    HISTORY OF PRESENT ILLNESS:  The patient is a 58 y.o. year old female who is here for follow-up with the above-mentioned history.    History of Present Illness the patient returns today in follow-up due to begin cycle 16 Xeloda and also continuing on monthly Xgeva that is due today.  She has CT scans and bone scan to review.  She is actually due to start her oral Xeloda on 1/30/19.  She has been tolerating treatment reasonably well.  She has mild hand-foot symptoms.  She notes that her hands have been somewhat worsened recently although she has not been using emollient cream as often.  She has now increased the frequency with some improvement.  She is not wearing gloves at night.  She does note some mild insomnia with difficulty both falling asleep and awakening.  She is taking some melatonin.  She notes some intermittent arthralgias for which she takes tramadol.  She has not experienced any further recent symptoms of UTI.  She continues to do well in terms of her mobility, ambulating with the assistance of walking poles.  She has been participating in some water physical therapy recently.  Appetite is normal and her weight has remained stable.    Past Medical History:   Diagnosis Date   • Acute pulmonary embolism, cancer-related 10/2017   • Allergic  rhinitis    • Arthritis     Right knee; left shoulder   • Cancer (CMS/[facility]) 2010    Right breast   • Cancer (CMS/[facility]) 10/2017    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   • Charcot-Saloni-Tooth disease    • CPAP (continuous positive airway pressure) dependence    • GERD (gastroesophageal reflux disease)    • H/O Colon polyps    • H/O Uterine fibroid    • Heart murmur    • Hyperlipidemia    • Hypertension    • PONV (postoperative nausea and vomiting)      Past Surgical History:   Procedure Laterality Date   • BLADDER SURGERY      Bladder lift   • BREAST RECONSTRUCTION, BREAST TISSUE EXPANDER INSERTION Right    • BREAST SURGERY Right 2010    Mastectomy   •  SECTION  ,    • CHOLECYSTECTOMY     • COLONOSCOPY     • HERNIA REPAIR  2015    [facility]   • HYSTERECTOMY      Partial   • KNEE SURGERY Right    • LEG SURGERY Left     Broken   • MASTECTOMY Right    • CT TREAT TIBIAL SHAFT FX, INTRAMED IMPLANT Left 2017    Procedure: TIBIA INTRAMEDULLARY NAIL/DON INSERTION;  Surgeon: Romero Shanks MD;  Location: [facility]  Service: Orthopedics   • THORACIC DECOMPRESSION POSTERIOR FUSION WITH INSTRUMENTATION N/A 2017    Procedure: T6 costotransversectomy and decompression with T3 to  T9 posterior spinal fusion with instrumentation with Jennifer Gonzalez and Nael Dennis [facility];  Surgeon: Quan Nayak MD;  Location: [facility]  Service:        ONCOLOGIC HISTORY:  (History from previous dates can be found in the separate document.)    Current Outpatient Medications on File Prior to Visit   Medication Sig Dispense Refill   • acetaminophen (TYLENOL) 500 MG tablet Take 500 mg by mouth Every 6 (Six) Hours As Needed for Mild Pain .     • calcium carbonate (OS-CARY) 600 MG tablet Take 600 mg by mouth 2 (Two) Times a Day With Meals.     • capecitabine (XELODA) 500 MG chemo tablet Take 3 tabs (1500 mg) po twice a day for 7 days on then 7 days off. 84 tablet 3   • cholecalciferol (VITAMIN D3)  1000 units tablet Take 1,000 Units by mouth Daily.     • cyclobenzaprine (FLEXERIL) 10 MG tablet Take 1 tablet by mouth 3 (Three) Times a Day As Needed for Muscle Spasms. 30 tablet 3   • diazePAM (VALIUM) 10 MG tablet Take 1 tablet by mouth Every 12 (Twelve) Hours As Needed for Anxiety. 10 tablet 1   • diphenoxylate-atropine (LOMOTIL) 2.5-0.025 MG per tablet Take 1 tablet by mouth 4 (Four) Times a Day As Needed for Diarrhea. 60 tablet 2   • FLONASE ALLERGY RELIEF 50 MCG/ACT nasal spray 2 sprays into each nostril daily.  11   • gabapentin (NEURONTIN) 300 MG capsule Take 1 capsule by mouth 3 (Three) Times a Day. 90 capsule 2   • HYDROmorphone (DILAUDID) 4 MG tablet Take 1 tablet by mouth Every 4 (Four) Hours As Needed for Moderate Pain . 60 tablet 0   • mesalamine (LIALDA) 1.2 g EC tablet TAKE 1 TABLET BY MOUTH TWICE DAILY 180 tablet 0   • nitrofurantoin (MACRODANTIN) 100 MG capsule Take 1 capsule by mouth 2 (Two) Times a Day. 20 capsule 0   • nitrofurantoin, macrocrystal-monohydrate, (MACROBID) 100 MG capsule Take 1 capsule by mouth 2 (Two) Times a Day. 10 capsule 0   • omeprazole (PriLOSEC) 40 MG capsule TAKE 1 CAPSULE DAILY 90 capsule 2   • ondansetron ODT (ZOFRAN-ODT) 8 MG disintegrating tablet Take 1 tablet by mouth Every 8 (Eight) Hours As Needed for Nausea or Vomiting. 30 tablet 1   • phenazopyridine (PYRIDIUM) 200 MG tablet Take 200 mg by mouth 3 (Three) Times a Day As Needed for bladder spasms.     • prochlorperazine (COMPAZINE) 10 MG tablet Take 1 tablet by mouth Every 6 (Six) Hours As Needed for Nausea or Vomiting. 30 tablet 0   • sennosides-docusate sodium (SENOKOT-S) 8.6-50 MG tablet Take 2 tablets by mouth 2 (Two) Times a Day. 90 tablet 2   • traMADol (ULTRAM) 50 MG tablet Take 1 tablet by mouth Every 8 (Eight) Hours As Needed for Moderate Pain . 90 tablet 2   • trimethoprim (TRIMPEX) 100 MG tablet Take 100 mg by mouth Daily.  2   • Tuberculin PPD (APLISOL ID) Inject  into the skin.     • [DISCONTINUED]  enoxaparin (LOVENOX) 80 MG/0.8ML solution syringe INJECT 0.8 ML UNDER THE SKIN Q 12 H  3   • [DISCONTINUED] enoxaparin (LOVENOX) 80 MG/0.8ML solution syringe INJECT 0.8 ML UNDER THE SKIN EVERY 12 HOURS 48 mL 1   • Unable to find SWISH AND SPIT 5 ML PO Q 2 H PRN  0     Current Facility-Administered Medications on File Prior to Visit   Medication Dose Route Frequency Provider Last Rate Last Dose   • [COMPLETED] denosumab (XGEVA) injection 120 mg  120 mg Subcutaneous Once Ubaldo Hancock Jr., MD   120 mg at 01/28/19 1140       ALLERGIES:     Allergies   Allergen Reactions   • Hydrocodone Nausea Only   • Morphine And Related Nausea And Vomiting     Social History     Socioeconomic History   • Marital status:      Spouse name: Murray   • Number of children: 3   • Years of education: College   • Highest education level: Not on file   Social Needs   • Financial resource strain: Not on file   • Food insecurity - worry: Not on file   • Food insecurity - inability: Not on file   • Transportation needs - medical: Not on file   • Transportation needs - non-medical: Not on file   Occupational History     Employer: [employer]     Employer: RETIRED   Tobacco Use   • Smoking status: Never Smoker   • Smokeless tobacco: Never Used   Substance and Sexual Activity   • Alcohol use: Yes     Comment: social   • Drug use: No   • Sexual activity: Defer   Other Topics Concern   • Not on file   Social History Narrative   • Not on file     Family History   Problem Relation Age of Onset   • Heart disease Mother    • Hyperlipidemia Mother    • Hypertension Mother    • Diabetes Father    • Charcot-Saloni-Tooth disease Father    • Heart disease Father    • Hypertension Father    • Heart failure Father    • Diabetes Sister    • Heart disease Sister    • Hypertension Sister    • Heart disease Maternal Aunt    • Scoliosis Maternal Aunt    • Heart disease Maternal Uncle    • Diabetes Maternal Grandmother    • Heart disease Maternal Grandmother     • Hypertension Maternal Grandmother    • Uterine cancer Maternal Grandmother    • Heart disease Maternal Grandfather      Review of Systems   Constitutional: Positive for fatigue. Negative for activity change, appetite change, fever and unexpected weight change.   HENT: Negative for congestion, mouth sores, nosebleeds, sore throat and voice change.    Eyes: Negative for discharge.   Respiratory: Negative for cough, shortness of breath and wheezing.    Cardiovascular: Negative for chest pain, palpitations and leg swelling.   Gastrointestinal: Negative for abdominal distention, abdominal pain, blood in stool, constipation, diarrhea, nausea and vomiting.   Endocrine: Negative for cold intolerance and heat intolerance.   Genitourinary: Negative for difficulty urinating, dysuria, frequency and hematuria.   Musculoskeletal: Positive for arthralgias. Negative for back pain, joint swelling and myalgias.   Skin: Positive for rash. Negative for wound.   Neurological: Negative for dizziness, syncope, weakness, light-headedness, numbness and headaches.   Hematological: Negative for adenopathy. Does not bruise/bleed easily.   Psychiatric/Behavioral: Positive for sleep disturbance. Negative for confusion. The patient is not nervous/anxious.          Objective      Vitals:    01/28/19 1102   BP: 128/83   Pulse: 96   Resp: 16   Temp: 98 °F (36.7 °C)   SpO2: 97%      Current Status 1/28/2019   ECOG score 1   Pain 0/10    Physical Exam   Constitutional: She is oriented to person, place, and time. She appears well-developed and well-nourished.   The patient is ambulating without a walker today   HENT:   Mouth/Throat: Oropharynx is clear and moist.   Eyes: Conjunctivae are normal.   Neck: No thyromegaly present.   Cardiovascular: Normal rate and regular rhythm. Exam reveals no gallop and no friction rub.   No murmur heard.  Pulmonary/Chest: Breath sounds normal. No respiratory distress.   Abdominal: Soft. Bowel sounds are normal. She  exhibits no distension. There is no tenderness.   Musculoskeletal: She exhibits edema.   Lower extremity braces in place.  Trace bilateral lower extremity edema.   Lymphadenopathy:        Head (right side): No submandibular adenopathy present.     She has no cervical adenopathy.     She has no axillary adenopathy.        Right: No inguinal and no supraclavicular adenopathy present.        Left: No inguinal and no supraclavicular adenopathy present.   Neurological: She is alert and oriented to person, place, and time. No cranial nerve deficit.   Bilateral lower extremity strength, 4+/5   Skin: Skin is warm and dry. No rash noted.   Mild erythema involving the palms of the hands.  Mild degree of skin cracking with minor peeling   Psychiatric: She has a normal mood and affect. Her behavior is normal.       RECENT LABS:  Hematology WBC   Date Value Ref Range Status   01/28/2019 3.78 (L) 4.50 - 10.70 10*3/mm3 Final     RBC   Date Value Ref Range Status   01/28/2019 4.07 3.90 - 5.20 10*6/mm3 Final     Hemoglobin   Date Value Ref Range Status   01/28/2019 13.0 11.9 - 15.5 g/dL Final     Hematocrit   Date Value Ref Range Status   01/28/2019 39.2 35.6 - 45.5 % Final     Platelets   Date Value Ref Range Status   01/28/2019 240 140 - 500 10*3/mm3 Final        Lab Results   Component Value Date    GLUCOSE 106 (H) 01/28/2019    BUN 19 01/28/2019    CREATININE 0.90 01/28/2019    EGFRIFNONA 64 01/28/2019    EGFRIFAFRI 80 09/25/2017    BCR 21.1 01/28/2019    K 4.4 01/28/2019    CO2 25.9 01/28/2019    CALCIUM 9.2 01/28/2019    PROTENTOTREF 6.4 09/25/2017    ALBUMIN 4.30 01/28/2019    LABIL2 2.2 09/25/2017    AST 21 01/28/2019    ALT 31 01/28/2019     CT chest abdomen pelvis 1/21/19:  IMPRESSION:  Stable exam  1. Osseous metastatic disease is seen without any interim change from  prior imaging. In the chest, there is a unchanged subcutaneous  metastases along the right posterolateral chest wall as well as stable  right lower lobe  nodules.  2. Stranding and foci of air within the anterior abdominal wall most  consistent with injection granulomas. Some of the soft tissue density  areas appear particularly nodular and attention on follow-up imaging is  recommended.  3. Additional findings as above.     Bone scan 1/21/19:  IMPRESSION:  No scintigraphic change when compared to previous bone scan  10/31/2018 or 08/13/2018.      Assessment/Plan      1. Previous Stage Ib ([id number]) right breast cancer:  · Diagnosed May 2010 with excisional biopsy for invasive ductal carcinoma, 1.3 cm, grade 2, ER 90%, AK 80%, HER-2/peter negative (1+ IHC).    · Subsequent right mastectomy in July 2010 with no residual breast malignancy, 1/5 sentinel lymph node with micrometastasis (0.25 mm).    · Treated in the [facility] system with adjuvant AC ×4 cycles in 2010 (no taxanes administered due to underlying Charcot-Saloni-Tooth with peripheral neuropathy).    · Adjuvant Femara (postmenopausal) initiated October 2010 with plan to treat ×10 years.    · Genetic testing reportedly negative.    · Developed osteopenia treated with Prolia beginning 2/27/13. Subsequently discontinued due to identification of metastatic disease.  2. Recurrent/metastatic disease identified 10/8/17:  · Disease involving thoracic spine with cord compression at T6, lumbosacral involvement, sternal and right sternoclavicular involvement.    · Femara discontinued in 10/2017.    · Radiation administered (in the [facility] system) to the thoracic spine beginning 10/19/17 treating T3-T9 to a dose of 24 gray in 6 fractions.  · Evaluation with MRI 12/8/17 showing persistent T6 cord compression with persistent neurologic compromise requiring surgical treatment 12/11/17 with T6 laminectomy/corpectomy and T3-T9 fusion.  Pathology with metastatic carcinoma of breast origin, ER negative, AK negative, HER-2/peter negative (1+ IHC).  · Additional staging evaluation 12/8/17 with no evidence of visceral metastatic disease,  bone scan showing involvement of thoracic spine, sternum, left humerus, mid frontal bone.  No plane film correlate of left humerus lesion.  MRI lumbar spine with small intradural L3 metastasis.  CA 15-3 12/6/17- 17.  · Palliative radiation therapy to L3 dural metastasis and left humerus initiated 1/15/18 (10 fractions), completed 1/26/18.  · Hypercalcemia of malignancy with calcium in the 10-11 range.  · Initiation of monthly Xgeva 1/23/18.  · Baseline CT scan 1/30/18 with no evidence of visceral involvement.  Cluster of nodular opacities in the right lower lobe suspected to be infectious or related to bronchiolitis. Bone scan 1/30/18 showed postsurgical change in the thoracic spine, stable uptake in the frontal bone, no new areas of disease.  · Initiation of palliative oral single agent Xeloda 2/7/18 2000 mg a.m., 1500 mg p.m. for 14/21 days.   · Following 3 cycles xeloda, bone scan 4/4/18 showed no change from the prior study.  CT scan 4/4/18 showed a small pericardial effusion of unclear significance as well as a subcutaneous nodule in the right lateral chest wall.  Subsequent evaluation with echocardiogram 4/17/18 showed no evidence of pericardial effusion.  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  We discussed the possibility of surgical excision of the right subcutaneous chest wall lesion for more definitive diagnosis.  Reviewed previous CT images dating back to 12/8/17 and the lesion was present even at that time measuring around 1.7 cm although not commented on in the radiology report.  As this appears to be an indolent low-grade process unrelated to her breast cancer, recommendee foregoing surgical excision at this time and monitoring this area on future scans.  The patient agreed.    · Following 6 cycles of Xeloda, CT 6/6/18  showed stable findings, no evidence  of progressive disease.  There was a comment regarding subcutaneous abnormality in the anterior abdominal wall and this was related to Lovenox injection sites.  Bone scan 6/6/18 showed no interval change.   · CT scan and bone scan 8/13/18 following 9 cycles of Xeloda showed stable findings with no evidence of significant visceral metastases.  Her bone lesions appear stable on bone scan.  The spindle cell neoplasm in her right chest wall actually decreased in size from 2 cm down to 1.6 cm.    · The patient experienced some symptoms of diarrhea, anorexia, generalized weakness during cycle 9 Xeloda it was unclear whether this was related to a viral gastroenteritis or toxicity from treatment.  Symptoms recurred during cycle 10 and treatment was cut short by 2 days.  Symptoms attributed to Xeloda.  With cycle 11, dose and schedule altered to 1500 mg twice daily for 7 days on followed by 7 days off .  · CT scan and bone scan from 10/31/18 following 12 cycles of Xeloda showed stable disease.    · Patient returns today in follow-up due to begin her 16th cycle Xeloda along with monthly Xgeva with CT scans and bone scan to review from 1/21/19.  Fortunately, both CT scan and bone scan shows stable findings.  She is tolerating treatment reasonably well.  Hand foot symptoms are slightly worse and in the coal dry weather involving her hands.  I encouraged her to use emollient cream more frequently.  I also encouraged the use of gloves at night.  We will proceed on with treatment as planned.  She will return in 4 weeks when she is due to begin cycle 17.  We discussed pursuing repeat CT scan and bone scan after 3 cycles (end of cycle 18).  3. Right hip/gluteal hematoma:  · The patient had developed considerable pain in the right hip and underwent MRI 3/22/18 visit showed a 5.7 x 4.3 x 11 cm hematoma in the right gluteal region.  · Lovenox was held 3/23 through 4/6/18.  Resolution of pain, Lovenox was resumed. Hematoma likely  occurred due to minimal trauma from transfer out of her wheelchair.   4. Right pulmonary embolism:  · Diagnosed on CT angiogram 10/21/17 involving small right lower lobe pulmonary artery.  Lower extremity Dopplers negative.  · Bilateral lower extremity Dopplers negative again 12/5/17.  · Continuing on Lovenox 1 mg/kg (80 mg) subcutaneous injection every 12 hours.  The patient has not experienced any recent bleeding issues.  Her CT scan does note continued formation of granulomas in the abdominal wall from her injections.  There is now evidence regarding the efficacy of novel agents in the setting of malignancy.  We discussed potential transition to Eliquis.  The patient did want to proceed. New script sent for Eliquis 5mg bid.  She does want to finish out the next 2 weeks of Lovenox which are remaining and she will transition to the new medication.  5. Cancer related pain:  · Previously receiving Duragesic 50 µg patch every 72 hours along with Dilaudid 4 mg as needed for breakthrough pain  · The patient's pain improved over time and she was able to discontinue both Duragesic and Dilaudid in the interval.  · Patient does take occasional Flexeril at bedtime due to back spasm/pain when she has been more active.  · Right hip pain as noted above due to right gluteal hematoma, previously prescribed Dilaudid 4 mg every 4 hours as needed #60 with no refill.  Pain from hematoma resolved.  · The patient does have some occasional aggravation of her chronic back pain with significant rehabilitation activity and requires an occasional dose of Dilaudid.  Pain currently improved.  6. Chemotherapy-induced diarrhea with subsequent C. difficile colitis:  · The patient developed initial diarrhea related to Xeloda at regular dosing.  · Symptoms improved on reduced dose Xeloda  · Flare of symptoms in October 2018 with apparent finding of C. difficile colitis by GI, treated with course of oral vancomycin with improvement in  symptoms.  7. Traumatic left tibia/fibular fracture:  · Status post ORIF 12/6/17  · Specimen was sent for pathologic review, negative for evidence of malignancy  8. Hypercalcemia:  · Suspect hypercalcemia of malignancy, calcium in  10-11 range previously.  · Calcium normalized following initiation of monthly Xgeva on 1/23/18.  9. Chemotherapy-induced mucositis:  · Patient had a minimal degree of mucositis with cycle 2.  The patient has magic mouthwash to use as needed.  No subsequent mucositis.  10. Recurrent UTI, bladder wall thickening on CT:  · Patient had an enterococcal UTI on 3/2/18 sensitive to nitrofurantoin and received treatment, unclear how long.  · Recurrent UTI 3/20/18 with urine culture growing Klebsiella, initially treated with nitrofurantoin, transitioned to Levaquin.  · CT 4/4/18 with diffuse bladder wall thickening with increased nodular thickening at the left base.  Referral to urogynecology Dr. May Johnson.  She was placed on a prophylactic dose of trimethoprim 100 mg daily, bladder wall thickening felt to be related to recent recurrent urinary tract infections.  · Patient with urinary symptoms, treated with course of Macrobid at the end of December 2018, urine culture however was negative 12/31/18.  11.   Mobility:  · The patient underwent an intensive course of [facility] at [facility].  She graduated from her outpatient course November 2018.  · Overall the patient has improved dramatically in terms of mobility, now able to ambulate without the use of a walker and is achieving some independent activity.  12.  Depression:  · The patient weaned herself off of Cymbalta and reports that her symptoms remain stable.  13. Hand-foot syndrome secondary to Xeloda:  · Symptoms have increased slightly involving the hands.  I have encouraged patient to use emollient cream more frequently during the day and were gloves at night..  14. Elevated liver function studies:  · Liver function studies increased  8/20/19 with ALT 98, AST 70, normal total bilirubin.  · Negative viral hepatitis A, B, and C panel 8/23/18  · Likely related to hepatic steatosis seen on CT, no definitive evidence of metastatic liver lesions.   · Gradual improvement of LFTs, currently normal today.  15. Chemotherapy induced leukopenia:  · Patient with mild leukopenia secondary to Xeloda, WBC 3.78, ANC adequate at 2.21.    Plan:  1. On 1/30/19, begin cycle 16 Xeloda continuing with 1500 mg twice a day 7 days on followed by 7 days off  2. Monthly Xgeva today  3. Continue Lovenox 80 mg every 12 hours.  Prescription sent for Eliquis 5mg bid.  The patient will transition anticoagulant once she completes her current supply (2 weeks remaining) of Lovenox.  4. Increase use of emollient cream on the hands and were gloves at night  5. In 4 weeks MD visit with CBC, CMP, magnesium, phosphorus.  The patient will be due for Xgeva.  She will also be due to begin cycle 17 Xeloda.  Plan repeat CT scan and bone scan at the end of cycle 18.

## 2019-01-29 ENCOUNTER — TELEPHONE (OUTPATIENT)
Dept: ONCOLOGY | Facility: CLINIC | Age: 59
End: 2019-01-29

## 2019-01-29 NOTE — TELEPHONE ENCOUNTER
----- Message from Ubaldo Richards Jr., MD sent at 1/28/2019  5:00 PM EST -----  Regarding: RE: PT STATES SHE IS DUE FOR CTs / BONE SCAN, NO ORDERS  She just had scans that we reviewed today and will not be repeating for 3 months  ----- Message -----  From: Edelmira Thomason  Sent: 1/28/2019   4:06 PM  To: Ubaldo Richards Jr., MD  Subject: PT STATES SHE IS DUE FOR CTs / BONE SCAN, NO#    DR RICHARDS - DO YOU WANT THE PATIENT TO HAVE CTs / BONE SCAN PRIOR TO HER NEXT VISIT IN 4 WKS (?) THE PATIENT STATES SHE IS DUE - THERE ARE NO ORDERS.    THANK YOU!!    ALVINA

## 2019-01-30 ENCOUNTER — HOSPITAL ENCOUNTER (OUTPATIENT)
Dept: PHYSICAL THERAPY | Facility: HOSPITAL | Age: 59
Setting detail: THERAPIES SERIES
End: 2019-01-30

## 2019-02-04 ENCOUNTER — HOSPITAL ENCOUNTER (OUTPATIENT)
Dept: PHYSICAL THERAPY | Facility: HOSPITAL | Age: 59
Setting detail: THERAPIES SERIES
Discharge: HOME | End: 2019-02-04

## 2019-02-04 PROCEDURE — 97113 AQUATIC THERAPY/EXERCISES: CPT | Performed by: PHYSICAL THERAPIST

## 2019-02-04 NOTE — THERAPY TREATMENT NOTE
Outpatient Physical Therapy Ortho Treatment Note  [facility]     Patient Name: Martha Davey  : 1960  MRN: [id number]  Today's Date: 2019      Visit Date: 2019    Visit Dx:  No diagnosis found.    Patient Active Problem List   Diagnosis   • Malignant neoplasm of overlapping sites of right breast in female, estrogen receptor negative (CMS/HCC)   • Hematuria   • Hyperlipidemia   • Hypertension   • Hereditary sensorimotor neuropathy   • Hyperglycemia   • MARIA M on CPAP   • Gastroesophageal reflux disease   • Colon polyp   • Diverticulitis of colon with perforation   • Foot pain   • Osteoarthritis of knee   • Leukocytosis   • Osteoarthritis of shoulder   • Senile osteopenia   • Long term current use of aromatase inhibitor   • Chronic right-sided thoracic back pain   • Closed fracture of left fibula and tibia   • Closed displaced spiral fracture of shaft of left tibia   • Cancer associated pain   • Cord compression (CMS/HCC)   • Drug induced constipation   • Closed T6 spinal fracture (CMS/HCC)   • History of pulmonary embolism   • S/P ORIF (open reduction internal fixation) fracture   • Bone metastases (CMS/HCC)   • Surgery follow-up examination   • Dysuria   • Hypercalcemia of malignancy   • Pericardial effusion   • Other fatigue        Past Medical History:   Diagnosis Date   • Acute pulmonary embolism, cancer-related 10/2017   • Allergic rhinitis    • Arthritis     Right knee; left shoulder   • Cancer (CMS/HCC) 2010    Right breast   • Cancer (CMS/HCC) 10/2017    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   • Charcot-Saloni-Tooth disease    • CPAP (continuous positive airway pressure) dependence    • GERD (gastroesophageal reflux disease)    • H/O Colon polyps    • H/O Uterine fibroid    • Heart murmur    • Hyperlipidemia    • Hypertension    • PONV (postoperative nausea and vomiting)         Past Surgical History:   Procedure Laterality Date   • BLADDER SURGERY      Bladder lift   • BREAST  RECONSTRUCTION, BREAST TISSUE EXPANDER INSERTION Right 2010   • BREAST SURGERY Right 2010    Mastectomy   •  SECTION  ,    • CHOLECYSTECTOMY     • COLONOSCOPY     • HERNIA REPAIR  2015    [facility]   • HYSTERECTOMY      Partial   • KNEE SURGERY Right    • LEG SURGERY Left     Broken   • MASTECTOMY Right    • OR TREAT TIBIAL SHAFT FX, INTRAMED IMPLANT Left 2017    Procedure: TIBIA INTRAMEDULLARY NAIL/DON INSERTION;  Surgeon: Romero Shanks MD;  Location: [facility];  Service: Orthopedics   • THORACIC DECOMPRESSION POSTERIOR FUSION WITH INSTRUMENTATION N/A 2017    Procedure: T6 costotransversectomy and decompression with T3 to  T9 posterior spinal fusion with instrumentation with Jennifer Gonzalez and Nael No Cellsaver;  Surgeon: Quan Nayak MD;  Location: [facility];  Service:                        PT Assessment/Plan     Row Name 19 1420          PT Assessment    Assessment Comments  Adding new core and balance activities. Verbal cueing and demonstration for proper technique with all. Able to ambulate across pool with conditional independence (use of noodle for assist).  -CK       User Key  (r) = Recorded By, (t) = Taken By, (c) = Cosigned By    Initials Name Provider Type    CK You Bunn, PT Physical Therapist              Exercises     Row Name 19 1300             Subjective Comments    Subjective Comments  I like the pool so far. I put anotherpad in my shoe and that seemed tohelp.   -CK         Subjective Pain    Able to rate subjective pain?  yes  -CK      Pre-Treatment Pain Level  0  -CK      Post-Treatment Pain Level  0  -CK         Aquatics    54183 - Aquatic Therapy Minutes  45  -CK         Aquatics LE    Water Walk  forward;side holding LN for support x 2 laps ea  -CK      Stretch 1  walking backwards at rail 10’ x 4  -CK      Stretch 2  kickboard push/pull x 15  -CK      Stretch 3  shldr abd/abd x 20 yellow bouys  -CK      Stretch Other 1  B  leg press x 15, SN  -CK      Stretch Other 2  sit to stand from bench x 10* gluteal squeeze at top  -CK      Abdominals  noodle;Other (comment) LN x 15, wide mundo  -CK      Clams  suspended x2 min  -CK      Hip Abd/Add  B x15  -CK      Mini Squat  15X *gluteal squeeze at top  -CK      Toe/Heel Raises  15  -CK      Uni-Squat  B hip circles cw/ccw 10/10  -CK      Uni-Clock  LAQ, B x 15 holding 10”  -CK      Step Ups  fwd, 8’ B x 12  light UE support  -CK      Bicycle  suspended 2 min  -CK      Flutter/Scissor  HS curls, B x 20  -CK         Exercise 1    Exercise Name 1  alternating step taps-8” box  -CK      Reps 1  20  -CK      Additional Comments  no UE support  -CK        User Key  (r) = Recorded By, (t) = Taken By, (c) = Cosigned By    Initials Name Provider Type    CK You Bunn S, PT Physical Therapist                                            Time Calculation:   Start Time: 1345  Stop Time: 1430  Time Calculation (min): 45 min  Total Timed Code Minutes- PT: 45 minute(s)  Therapy Suggested Charges     Code   Minutes Charges    66799 (CPT®) Hc Pt Neuromusc Re Education Ea 15 Min      49501 (CPT®) Hc Pt Ther Proc Ea 15 Min      24808 (CPT®) Hc Gait Training Ea 15 Min      40189 (CPT®) Hc Pt Therapeutic Act Ea 15 Min      20759 (CPT®) Hc Pt Manual Therapy Ea 15 Min      64426 (CPT®) Hc Pt Ther Massage- Per 15 Min      65525 (CPT®) Hc Pt Iontophoresis Ea 15 Min      59980 (CPT®) Hc Pt Elec Stim Ea-Per 15 Min      12262 (CPT®) Hc Pt Ultrasound Ea 15 Min      57042 (CPT®) Hc Pt Self Care/Mgmt/Train Ea 15 Min      31059 (CPT®) Hc Pt Prosthetic (S) Train Initial Encounter, Each 15 Min      63237 (CPT®) Hc Orthotic(S) Mgmt/Train Initial Encounter, Each 15min      76963 (CPT®) Hc Pt Aquatic Therapy Ea 15 Min 45 3    96349 (CPT®) Hc Pt Orthotic(S)/Prosthetic(S) Encounter, Each 15 Min       (CPT®) Hc Pt Electrical Stim Unattended      Total  45 3        Therapy Charges for Today     Code Description Service Date  Service Provider Modifiers Qty    43192596667 HC PT AQUATIC THERAPY EA 15 MIN 2/4/2019 You Bunn, PT GP 3                    You HELTON. Oni, PT  2/4/2019

## 2019-02-06 ENCOUNTER — HOSPITAL ENCOUNTER (OUTPATIENT)
Dept: PHYSICAL THERAPY | Facility: HOSPITAL | Age: 59
Setting detail: THERAPIES SERIES
Discharge: HOME | End: 2019-02-06

## 2019-02-06 DIAGNOSIS — R53.1 GENERALIZED WEAKNESS: ICD-10-CM

## 2019-02-06 DIAGNOSIS — Z74.09 IMPAIRED MOBILITY AND ENDURANCE: Primary | ICD-10-CM

## 2019-02-06 PROCEDURE — 97113 AQUATIC THERAPY/EXERCISES: CPT | Performed by: PHYSICAL THERAPIST

## 2019-02-06 NOTE — THERAPY TREATMENT NOTE
Outpatient Physical Therapy Ortho Treatment Note  [facility]     Patient Name: Martha Davey  : 1960  MRN: [id number]  Today's Date: 2019      Visit Date: 2019    Visit Dx:    ICD-10-CM ICD-9-CM   1. Impaired mobility and endurance Z74.09 V49.89   2. Generalized weakness R53.1 780.79       Patient Active Problem List   Diagnosis   • Malignant neoplasm of overlapping sites of right breast in female, estrogen receptor negative (CMS/HCC)   • Hematuria   • Hyperlipidemia   • Hypertension   • Hereditary sensorimotor neuropathy   • Hyperglycemia   • MARIA M on CPAP   • Gastroesophageal reflux disease   • Colon polyp   • Diverticulitis of colon with perforation   • Foot pain   • Osteoarthritis of knee   • Leukocytosis   • Osteoarthritis of shoulder   • Senile osteopenia   • Long term current use of aromatase inhibitor   • Chronic right-sided thoracic back pain   • Closed fracture of left fibula and tibia   • Closed displaced spiral fracture of shaft of left tibia   • Cancer associated pain   • Cord compression (CMS/HCC)   • Drug induced constipation   • Closed T6 spinal fracture (CMS/HCC)   • History of pulmonary embolism   • S/P ORIF (open reduction internal fixation) fracture   • Bone metastases (CMS/HCC)   • Surgery follow-up examination   • Dysuria   • Hypercalcemia of malignancy   • Pericardial effusion   • Other fatigue        Past Medical History:   Diagnosis Date   • Acute pulmonary embolism, cancer-related 10/2017   • Allergic rhinitis    • Arthritis     Right knee; left shoulder   • Cancer (CMS/HCC) 2010    Right breast   • Cancer (CMS/HCC) 10/2017    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   • Charcot-Saloni-Tooth disease    • CPAP (continuous positive airway pressure) dependence    • GERD (gastroesophageal reflux disease)    • H/O Colon polyps    • H/O Uterine fibroid    • Heart murmur    • Hyperlipidemia    • Hypertension    • PONV (postoperative nausea and vomiting)         Past Surgical  History:   Procedure Laterality Date   • BLADDER SURGERY      Bladder lift   • BREAST RECONSTRUCTION, BREAST TISSUE EXPANDER INSERTION Right 2010   • BREAST SURGERY Right 2010    Mastectomy   •  SECTION  ,    • CHOLECYSTECTOMY     • COLONOSCOPY     • HERNIA REPAIR  2015    [facility]   • HYSTERECTOMY      Partial   • KNEE SURGERY Right    • LEG SURGERY Left     Broken   • MASTECTOMY Right    • NE TREAT TIBIAL SHAFT FX, INTRAMED IMPLANT Left 2017    Procedure: TIBIA INTRAMEDULLARY NAIL/DON INSERTION;  Surgeon: Romero Shanks MD;  Location: [facility];  Service: Orthopedics   • THORACIC DECOMPRESSION POSTERIOR FUSION WITH INSTRUMENTATION N/A 2017    Procedure: T6 costotransversectomy and decompression with T3 to  T9 posterior spinal fusion with instrumentation with Jennifer Gonzalez and Nael Dennis Cellsaver;  Surgeon: Quan Nayak MD;  Location: [facility];  Service:                        PT Assessment/Plan     Row Name 19 1411          PT Assessment    Assessment Comments  Patient c/o more of soreness than pain.  Continued with previous ex/activity working on core/hip strength/stability and balance, added trial SLS.  Patient balance challenged with some ex but using little to no UE balance support as able.  Patient requires cuing for posture, core activation, and correct form/technique with ther ex.    -RA        PT Plan    PT Plan Comments  continue aquatic therapy working on postural/core strengthening, functional LE strengthening, and balance.    -RA       User Key  (r) = Recorded By, (t) = Taken By, (c) = Cosigned By    Initials Name Provider Type    Licha Barnett, PT Physical Therapist              Exercises     Row Name 19 1300             Subjective Comments    Subjective Comments  Not really pain, a little sore in my arms  -IDANE         Subjective Pain    Able to rate subjective pain?  yes  -RA      Pre-Treatment Pain Level  0  -RA          Aquatics    53084 - Aquatic Therapy Minutes  45  -RA         Aquatics LE    Water Walk  forward;side holding LN for support x 2 laps ea  -RA      Stretch 1  walking backwards at rail 10’ x 4  -RA      Stretch 2  kickboard push/pull x 15  -RA      Stretch 3  shldr abd/abd x 20 yellow bouys  -RA      Stretch Other 1  B leg press x 15, SN  -RA      Stretch Other 2  sit to stand from bench x 10* gluteal squeeze at top  -RA      Abdominals  noodle;Other (comment) LN x 15, wide mundo  -RA      Clams  suspended x2 min  -RA      Hip Abd/Add  B x15  -RA      March in Place  w/ recip arms x 1 min  -RA      Mini Squat  15X *gluteal squeeze at top  -RA      Toe/Heel Raises  15  -RA      Uni-Squat  B hip circles cw/ccw 10/10  -RA      Uni-Clock  LAQ, B x 15 holding 10”  -RA      Step Ups  fwd, 8’ B x 12  light UE support  -RA      Bicycle  suspended 2 min  -RA      Flutter/Scissor  HS curls, B x 20  -RA         Exercise 1    Exercise Name 1  alternating step taps-8” box  -RA      Reps 1  20  -RA      Additional Comments  no UE support  -RA         Exercise 2    Exercise Name 2  Trial of SLS R/L challenging for patient.  -RA      Reps 2  2 trials ea  -RA        User Key  (r) = Recorded By, (t) = Taken By, (c) = Cosigned By    Initials Name Provider Type    Licha Barntet, PT Physical Therapist                             Therapy Education  Given: Symptoms/condition management, Posture/body mechanics, Mobility training  Program: Reinforced  How Provided: Verbal, Demonstration  Provided to: Patient  Level of Understanding: Teach back education performed, Verbalized              Time Calculation:   Start Time: 1340  Stop Time: 1425  Time Calculation (min): 45 min  Total Timed Code Minutes- PT: 45 minute(s)  Therapy Suggested Charges     Code   Minutes Charges    60087 (CPT®) Hc Pt Neuromusc Re Education Ea 15 Min      43994 (CPT®) Hc Pt Ther Proc Ea 15 Min      03207 (CPT®) Hc Gait Training Ea 15 Min      54929 (CPT®) Hc Pt  Therapeutic Act Ea 15 Min      71879 (CPT®) Hc Pt Manual Therapy Ea 15 Min      09244 (CPT®) Hc Pt Ther Massage- Per 15 Min      78702 (CPT®) Hc Pt Iontophoresis Ea 15 Min      40629 (CPT®) Hc Pt Elec Stim Ea-Per 15 Min      47118 (CPT®) Hc Pt Ultrasound Ea 15 Min      37409 (CPT®) Hc Pt Self Care/Mgmt/Train Ea 15 Min      11817 (CPT®) Hc Pt Prosthetic (S) Train Initial Encounter, Each 15 Min      38944 (CPT®) Hc Orthotic(S) Mgmt/Train Initial Encounter, Each 15min      46161 (CPT®) Hc Pt Aquatic Therapy Ea 15 Min 45 3    25895 (CPT®) Hc Pt Orthotic(S)/Prosthetic(S) Encounter, Each 15 Min       (CPT®) Hc Pt Electrical Stim Unattended      Total  45 3        Therapy Charges for Today     Code Description Service Date Service Provider Modifiers Qty    85200368069 HC PT AQUATIC THERAPY EA 15 MIN 2/6/2019 Licha Saba, PT GP 3                    Licha Saba, PT  2/6/2019

## 2019-02-11 ENCOUNTER — HOSPITAL ENCOUNTER (OUTPATIENT)
Dept: PHYSICAL THERAPY | Facility: HOSPITAL | Age: 59
Setting detail: THERAPIES SERIES
Discharge: HOME | End: 2019-02-11

## 2019-02-11 DIAGNOSIS — R53.1 GENERALIZED WEAKNESS: ICD-10-CM

## 2019-02-11 DIAGNOSIS — Z74.09 IMPAIRED MOBILITY AND ENDURANCE: Primary | ICD-10-CM

## 2019-02-11 PROCEDURE — 97113 AQUATIC THERAPY/EXERCISES: CPT | Performed by: PHYSICAL THERAPIST

## 2019-02-11 NOTE — THERAPY PROGRESS REPORT/RE-CERT
Outpatient Physical Therapy Ortho Progress Note  [facility]     Patient Name: Martha Davey  : 1960  MRN: [id number]  Today's Date: 2019      Visit Date: 2019    Patient Active Problem List   Diagnosis   • Malignant neoplasm of overlapping sites of right breast in female, estrogen receptor negative (CMS/HCC)   • Hematuria   • Hyperlipidemia   • Hypertension   • Hereditary sensorimotor neuropathy   • Hyperglycemia   • MARIA M on CPAP   • Gastroesophageal reflux disease   • Colon polyp   • Diverticulitis of colon with perforation   • Foot pain   • Osteoarthritis of knee   • Leukocytosis   • Osteoarthritis of shoulder   • Senile osteopenia   • Long term current use of aromatase inhibitor   • Chronic right-sided thoracic back pain   • Closed fracture of left fibula and tibia   • Closed displaced spiral fracture of shaft of left tibia   • Cancer associated pain   • Cord compression (CMS/HCC)   • Drug induced constipation   • Closed T6 spinal fracture (CMS/HCC)   • History of pulmonary embolism   • S/P ORIF (open reduction internal fixation) fracture   • Bone metastases (CMS/HCC)   • Surgery follow-up examination   • Dysuria   • Hypercalcemia of malignancy   • Pericardial effusion   • Other fatigue        Past Medical History:   Diagnosis Date   • Acute pulmonary embolism, cancer-related 10/2017   • Allergic rhinitis    • Arthritis     Right knee; left shoulder   • Cancer (CMS/HCC) 2010    Right breast   • Cancer (CMS/HCC) 10/2017    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   • Charcot-Saloni-Tooth disease    • CPAP (continuous positive airway pressure) dependence    • GERD (gastroesophageal reflux disease)    • H/O Colon polyps    • H/O Uterine fibroid    • Heart murmur    • Hyperlipidemia    • Hypertension    • PONV (postoperative nausea and vomiting)         Past Surgical History:   Procedure Laterality Date   • BLADDER SURGERY      Bladder lift   • BREAST RECONSTRUCTION, BREAST TISSUE EXPANDER  INSERTION Right 2010   • BREAST SURGERY Right 2010    Mastectomy   •  SECTION  ,    • CHOLECYSTECTOMY     • COLONOSCOPY     • HERNIA REPAIR  2015    [facility]   • HYSTERECTOMY      Partial   • KNEE SURGERY Right    • LEG SURGERY Left     Broken   • MASTECTOMY Right    • OR TREAT TIBIAL SHAFT FX, INTRAMED IMPLANT Left 2017    Procedure: TIBIA INTRAMEDULLARY NAIL/DON INSERTION;  Surgeon: Romero Shanks MD;  Location: [facility];  Service: Orthopedics   • THORACIC DECOMPRESSION POSTERIOR FUSION WITH INSTRUMENTATION N/A 2017    Procedure: T6 costotransversectomy and decompression with T3 to  T9 posterior spinal fusion with instrumentation with Jennifer Gonzalez and Nael Dennis Cellsaver;  Surgeon: Quan Nayak MD;  Location: [facility];  Service:        Visit Dx:     ICD-10-CM ICD-9-CM   1. Impaired mobility and endurance Z74.09 V49.89   2. Generalized weakness R53.1 780.79           PT Ortho     Row Name 19 1500       Left Upper Ext    Lt Shoulder Flexion AROM  90 mild compensation  -ASHLEE       MMT Left Upper Ext    Lt Shoulder Flexion MMT, Gross Movement  (3+/5) fair plus  -ASHLEE      User Key  (r) = Recorded By, (t) = Taken By, (c) = Cosigned By    Initials Name Provider Type    Natanael Lomas, PT Physical Therapist                      Therapy Education  Education Details: Explained the need for scapular stability for overhead reaching, to avoid compensatory patterns  Program: New, Reinforced  How Provided: Verbal, Demonstration  Provided to: Patient  Level of Understanding: Teach back education performed, Verbalized     PT OP Goals     Row Name 19 1500          PT Short Term Goals    STG 1  Patient will report reduction in pain for 12-24 hours post aquatic therapy  -ASHLEE     STG 1 Progress  Progressing  -ASHLEE     STG 1 Progress Comments  gets relief in shoulders until going to bed.  -ASHLEE     STG 2  Patient will perform sit to stand from pool bench without use of hands  "to increase functional strength  -ASHLEE     STG 2 Progress  Progressing  -ASHLEE     STG 3  Patient will increase L shoulder flexion AROM to 120 degrees or greater to increase ease with overhead tasks  -ASHLEE     STG 3 Progress  Ongoing  -ASHLEE     STG 3 Progress Comments  90 degrees with mild compensation  -        Long Term Goals    LTG 1  Patient will perform sit to stand from chair on land without use of hands to increase functional strength  -ASHLEE     LTG 1 Progress  Ongoing  -ASHLEE     LTG 2  Patient will increase L shoulder flex strength to 4/5 with MMT to increase ease with ADLs  -ASHLEE     LTG 2 Progress  Ongoing  -ASHLEE     LTG 2 Progress Comments  3+/5 in available range  -ASHLEE     LTG 3  Patient will demonstrate independence with advanced aquatic HEP to facilitate independent management of condition  -ASHLEE     LTG 3 Progress  Ongoing  -ASHLEE     LTG 4  Patient will improve score on LEFS by 15% or greater to improve quality of life (80% disability at eval)  -     LTG 4 Progress  Progressing  -ASHLEE     LTG 4 Progress Comments  Improved from 20% \"ability\" score to 31% \"ability, with perceived disability score of 69% for Lower Extremity function.  -       User Key  (r) = Recorded By, (t) = Taken By, (c) = Cosigned By    Initials Name Provider Type    Natanael Lomas, PT Physical Therapist          PT Assessment/Plan     Row Name 02/11/19 1806          PT Assessment    Assessment Comments  Ms. Davey has been seen for 6 skilled aquatic therapy sessions since initiating treatment for bone metastases, fracture of left tibia, fracture of sixth thoracic vertebae, R knee OA, and L shoulder OA. She reports noticing functional improvements in strength with her daily activities and reported improvement in LEFS to 25/80 (initial was 16/80). Despite small improvements she continues to have difficulty with balance and gait due to LE nerve damage and steppage gait pattern (wears B AFOs)and uses B walking sticks. She continues to require assist " for entry/exit via stairs (descent on bottom, exit NR technique with Assist). As well, she continues to need assist for ambulation in pool. She remains highly appropriate for skilled therapy at this time to progress program as allowable with bone metastasis.   -CK       User Key  (r) = Recorded By, (t) = Taken By, (c) = Cosigned By    Initials Name Provider Type    CK You Bunn, PT Physical Therapist            Exercises     Row Name 02/11/19 1500             Subjective Pain    Able to rate subjective pain?  yes  -ASHLEE      Pre-Treatment Pain Level  1  -ASHLEE      Subjective Pain Comment  Sore on bottom of left foot (near 5th metatarsal  and left shoulder is sore.   -ASHLEE         Aquatics    Aquatics performed?  Yes Reassessment  -ASHLEE      19520 - Aquatic Therapy Minutes  45  -ASHLEE         Aquatics LE    Water Walk  forward;side;backward  LN support x 2 laps ea. CGA/min assist for backwards  -ASHLEE      Stretch 1  Scapular squeeze5 sec X 5  -ASHLEE      Stretch 2  kickboard push/pull x 15  -ASHLEE      Stretch 3  shldr abd/abd, shallow x 20 yellow bouys  -ASHLEE      Stretch Other 1  B leg press x 15,lg foam ring  -ASHLEE      Stretch Other 2  Sit to stands X 2  -ASHLEE      Vertical Traction  Rows L5 paddles X 15  -ASHLEE      Abdominals  noodle;Other (comment) LN x 15, wide mundo  -ASHLEE      Clams  suspended x2 min  -ASHLEE      Hip Abd/Add  B x15  -ASHLEE      March in Place  --  -ASHLEE      Mini Squat  15X *gluteal squeeze at top  -ASHLEE      Toe/Heel Raises  15  -ASHLEE      Uni-Squat  --  -ASHLEE      Uni-Clock  --  -ASHLEE      Step Ups  --  -ASHLEE      Bicycle  2 min seated  -ASHLEE      Flutter/Scissor  HS curls, B x 20  -ASHLEE        User Key  (r) = Recorded By, (t) = Taken By, (c) = Cosigned By    Initials Name Provider Type    Natanael Lomas, PT Physical Therapist                           Lower Extremity Functional Index  Any of your usual work, housework or school activities: A little bit of difficulty  Your usual hobbies, recreational or sporting activities: Quite a  bit of difficulty  Getting into or out of the bath: Moderate difficulty  Walking between rooms: Moderate difficulty  Putting on your shoes or socks: A little bit of difficulty  Squatting: Quite a bit of difficulty  Lifting an object, like a bag of groceries from the floor: Quite a bit of difficulty  Performing light activities around your home: Moderate difficulty  Performing heavy activities around your home: Extreme difficulty or unable to perform activity  Getting into or out of a car: A little bit of difficulty  Walking 2 blocks: Quite a bit of difficulty  Walking a mile: Moderate difficulty  Going up or down 10 stairs (about 1 flight of stairs): Quite a bit of difficulty  Standing for 1 hour: Extreme difficulty or unable to perform activity  Sitting for 1 hour: Moderate difficulty  Running on even ground: Extreme difficulty or unable to perform activity  Running on uneven ground: Extreme difficulty or unable to perform activity  Making sharp turns while running fast: Extreme difficulty or unable to perform activity  Hopping: Extreme difficulty or unable to perform activity  Rolling over in bed: Quite a bit of difficulty  Total: 25      Time Calculation:     Therapy Suggested Charges     Code   Minutes Charges    63773 (CPT®) Hc Pt Neuromusc Re Education Ea 15 Min      51755 (CPT®) Hc Pt Ther Proc Ea 15 Min      08466 (CPT®) Hc Gait Training Ea 15 Min      37071 (CPT®) Hc Pt Therapeutic Act Ea 15 Min      98781 (CPT®) Hc Pt Manual Therapy Ea 15 Min      18145 (CPT®) Hc Pt Ther Massage- Per 15 Min      67137 (CPT®) Hc Pt Iontophoresis Ea 15 Min      85080 (CPT®) Hc Pt Elec Stim Ea-Per 15 Min      36975 (CPT®) Hc Pt Ultrasound Ea 15 Min      72308 (CPT®) Hc Pt Self Care/Mgmt/Train Ea 15 Min      57506 (CPT®) Hc Pt Prosthetic (S) Train Initial Encounter, Each 15 Min      41035 (CPT®) Hc Orthotic(S) Mgmt/Train Initial Encounter, Each 15min      70772 (CPT®) Hc Pt Aquatic Therapy Ea 15 Min 45 3    38488 (CPT®) Hc Pt  Orthotic(S)/Prosthetic(S) Encounter, Each 15 Min       (CPT®) Hc Pt Electrical Stim Unattended      Total  45 3          Start Time: 1523  Stop Time: 1613  Time Calculation (min): 50 min  Total Timed Code Minutes- PT: 45 minute(s)         PT G-Codes  Total: 25         You Bunn, PT  2/11/2019

## 2019-02-13 ENCOUNTER — HOSPITAL ENCOUNTER (OUTPATIENT)
Dept: PHYSICAL THERAPY | Facility: HOSPITAL | Age: 59
Setting detail: THERAPIES SERIES
End: 2019-02-13

## 2019-02-14 RX ORDER — GABAPENTIN 300 MG/1
CAPSULE ORAL
Qty: 90 CAPSULE | Refills: 0 | Status: SHIPPED | OUTPATIENT
Start: 2019-02-14 | End: 2019-05-06 | Stop reason: SDUPTHER

## 2019-02-18 ENCOUNTER — HOSPITAL ENCOUNTER (OUTPATIENT)
Dept: PHYSICAL THERAPY | Facility: HOSPITAL | Age: 59
Setting detail: THERAPIES SERIES
Discharge: HOME | End: 2019-02-18

## 2019-02-18 DIAGNOSIS — R53.1 GENERALIZED WEAKNESS: ICD-10-CM

## 2019-02-18 DIAGNOSIS — Z74.09 IMPAIRED MOBILITY AND ENDURANCE: Primary | ICD-10-CM

## 2019-02-18 PROCEDURE — 97113 AQUATIC THERAPY/EXERCISES: CPT | Performed by: PHYSICAL THERAPIST

## 2019-02-18 NOTE — THERAPY TREATMENT NOTE
Outpatient Physical Therapy Ortho Treatment Note     [facility]     Patient Name: Martha Davey  : 1960  MRN: [id number]  Today's Date: 2019      Visit Date: 2019    Visit Dx:    ICD-10-CM ICD-9-CM   1. Impaired mobility and endurance Z74.09 V49.89   2. Generalized weakness R53.1 780.79       Patient Active Problem List   Diagnosis   • Malignant neoplasm of overlapping sites of right breast in female, estrogen receptor negative (CMS/HCC)   • Hematuria   • Hyperlipidemia   • Hypertension   • Hereditary sensorimotor neuropathy   • Hyperglycemia   • MARIA M on CPAP   • Gastroesophageal reflux disease   • Colon polyp   • Diverticulitis of colon with perforation   • Foot pain   • Osteoarthritis of knee   • Leukocytosis   • Osteoarthritis of shoulder   • Senile osteopenia   • Long term current use of aromatase inhibitor   • Chronic right-sided thoracic back pain   • Closed fracture of left fibula and tibia   • Closed displaced spiral fracture of shaft of left tibia   • Cancer associated pain   • Cord compression (CMS/HCC)   • Drug induced constipation   • Closed T6 spinal fracture (CMS/HCC)   • History of pulmonary embolism   • S/P ORIF (open reduction internal fixation) fracture   • Bone metastases (CMS/HCC)   • Surgery follow-up examination   • Dysuria   • Hypercalcemia of malignancy   • Pericardial effusion   • Other fatigue        Past Medical History:   Diagnosis Date   • Acute pulmonary embolism, cancer-related 10/2017   • Allergic rhinitis    • Arthritis     Right knee; left shoulder   • Cancer (CMS/HCC) 2010    Right breast   • Cancer (CMS/HCC) 10/2017    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   • Charcot-Saloni-Tooth disease    • CPAP (continuous positive airway pressure) dependence    • GERD (gastroesophageal reflux disease)    • H/O Colon polyps    • H/O Uterine fibroid    • Heart murmur    • Hyperlipidemia    • Hypertension    • PONV (postoperative nausea and vomiting)         Past  Surgical History:   Procedure Laterality Date   • BLADDER SURGERY      Bladder lift   • BREAST RECONSTRUCTION, BREAST TISSUE EXPANDER INSERTION Right 2010   • BREAST SURGERY Right 2010    Mastectomy   •  SECTION  ,    • CHOLECYSTECTOMY     • COLONOSCOPY     • HERNIA REPAIR  2015    [facility]   • HYSTERECTOMY      Partial   • KNEE SURGERY Right    • LEG SURGERY Left     Broken   • MASTECTOMY Right    • SC TREAT TIBIAL SHAFT FX, INTRAMED IMPLANT Left 2017    Procedure: TIBIA INTRAMEDULLARY NAIL/DON INSERTION;  Surgeon: Romero Shanks MD;  Location: [facility];  Service: Orthopedics   • THORACIC DECOMPRESSION POSTERIOR FUSION WITH INSTRUMENTATION N/A 2017    Procedure: T6 costotransversectomy and decompression with T3 to  T9 posterior spinal fusion with instrumentation with Jennifer Gonzalez and Nael No Cellsaver;  Surgeon: Quan Nayak MD;  Location: [facility];  Service:                        PT Assessment/Plan     Row Name 19 1434          PT Assessment    Assessment Comments  Continued with skilled aquatic therapy services adding ankle weights to work on LE strength. No immediate adverse effects from use of weights today. Verbal cueing to decrease UE support to challenge balance with static standing activities.   -CK       User Key  (r) = Recorded By, (t) = Taken By, (c) = Cosigned By    Initials Name Provider Type    CK You Bunn, PT Physical Therapist              Exercises     Row Name 19 1300             Subjective Comments    Subjective Comments  I still have the sore on the bottom of my foot and now I have a sore on the lateral side bone. I see the foot doctor tomorrow.  -CK         Subjective Pain    Able to rate subjective pain?  yes  -CK      Pre-Treatment Pain Level  1  -CK      Post-Treatment Pain Level  1  -CK         Aquatics    54764 - Aquatic Therapy Minutes  45  -CK         Aquatics LE    Water Walk  forward;side x 2 laps across  pool. LN for assist.   -CK      Stretch 2  kickboard push/pull x 15  -CK      Stretch 3  shldr abd/abd, shallow x 20 yellow bouys  -CK      Stretch Other 1  B leg press x 20 lg foam ring  -CK      Abdominals  noodle;Other (comment) LN x 15, wide mundo  -CK      Clams  suspended x2 min  -CK      Hip Abd/Add  B x15, 1.5#  -CK      March in Place  suspended tuck ups x 15  -CK      Mini Squat  15X *gluteal squeeze at top  -CK      Uni-Squat  B hip circles cw/ccw 10/10, 1.5#  -CK      Uni-Clock  LAQ, B x 15, 1.5#  -CK      Bicycle  2 min seated  -CK      Flutter/Scissor  HS curls, B x 20, 1.5#  -CK        User Key  (r) = Recorded By, (t) = Taken By, (c) = Cosigned By    Initials Name Provider Type    CK You Bunn S, PT Physical Therapist                                            Time Calculation:   Start Time: 1345  Stop Time: 1430  Time Calculation (min): 45 min  Total Timed Code Minutes- PT: 45 minute(s)  Therapy Suggested Charges     Code   Minutes Charges    10700 (CPT®) Hc Pt Neuromusc Re Education Ea 15 Min      28321 (CPT®) Hc Pt Ther Proc Ea 15 Min      71009 (CPT®) Hc Gait Training Ea 15 Min      72603 (CPT®) Hc Pt Therapeutic Act Ea 15 Min      15334 (CPT®) Hc Pt Manual Therapy Ea 15 Min      33840 (CPT®) Hc Pt Ther Massage- Per 15 Min      76534 (CPT®) Hc Pt Iontophoresis Ea 15 Min      35990 (CPT®) Hc Pt Elec Stim Ea-Per 15 Min      08189 (CPT®) Hc Pt Ultrasound Ea 15 Min      28340 (CPT®) Hc Pt Self Care/Mgmt/Train Ea 15 Min      90613 (CPT®) Hc Pt Prosthetic (S) Train Initial Encounter, Each 15 Min      57941 (CPT®) Hc Orthotic(S) Mgmt/Train Initial Encounter, Each 15min      67911 (CPT®) Hc Pt Aquatic Therapy Ea 15 Min 45 3    86681 (CPT®) Hc Pt Orthotic(S)/Prosthetic(S) Encounter, Each 15 Min       (CPT®) Hc Pt Electrical Stim Unattended      Total  45 3        Therapy Charges for Today     Code Description Service Date Service Provider Modifiers Qty    44054403870 HC PT AQUATIC THERAPY EA 15 MIN  2/18/2019 You Bunn, PT GP 3                    You HELTON. Oni, PT  2/18/2019

## 2019-02-21 ENCOUNTER — HOSPITAL ENCOUNTER (OUTPATIENT)
Dept: PHYSICAL THERAPY | Facility: HOSPITAL | Age: 59
Setting detail: THERAPIES SERIES
Discharge: HOME | End: 2019-02-21

## 2019-02-21 DIAGNOSIS — R53.1 GENERALIZED WEAKNESS: ICD-10-CM

## 2019-02-21 DIAGNOSIS — Z74.09 IMPAIRED MOBILITY AND ENDURANCE: Primary | ICD-10-CM

## 2019-02-21 PROCEDURE — 97110 THERAPEUTIC EXERCISES: CPT | Performed by: PHYSICAL THERAPIST

## 2019-02-21 NOTE — THERAPY TREATMENT NOTE
Outpatient Physical Therapy Ortho Treatment Note  [facility]     Patient Name: Martha Davey  : 1960  MRN: [id number]  Today's Date: 2019      Visit Date: 2019    Visit Dx:    ICD-10-CM ICD-9-CM   1. Impaired mobility and endurance Z74.09 V49.89   2. Generalized weakness R53.1 780.79       Patient Active Problem List   Diagnosis   • Malignant neoplasm of overlapping sites of right breast in female, estrogen receptor negative (CMS/HCC)   • Hematuria   • Hyperlipidemia   • Hypertension   • Hereditary sensorimotor neuropathy   • Hyperglycemia   • MARIA M on CPAP   • Gastroesophageal reflux disease   • Colon polyp   • Diverticulitis of colon with perforation   • Foot pain   • Osteoarthritis of knee   • Leukocytosis   • Osteoarthritis of shoulder   • Senile osteopenia   • Long term current use of aromatase inhibitor   • Chronic right-sided thoracic back pain   • Closed fracture of left fibula and tibia   • Closed displaced spiral fracture of shaft of left tibia   • Cancer associated pain   • Cord compression (CMS/HCC)   • Drug induced constipation   • Closed T6 spinal fracture (CMS/HCC)   • History of pulmonary embolism   • S/P ORIF (open reduction internal fixation) fracture   • Bone metastases (CMS/HCC)   • Surgery follow-up examination   • Dysuria   • Hypercalcemia of malignancy   • Pericardial effusion   • Other fatigue        Past Medical History:   Diagnosis Date   • Acute pulmonary embolism, cancer-related 10/2017   • Allergic rhinitis    • Arthritis     Right knee; left shoulder   • Cancer (CMS/HCC) 2010    Right breast   • Cancer (CMS/HCC) 10/2017    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   • Charcot-Saloni-Tooth disease    • CPAP (continuous positive airway pressure) dependence    • GERD (gastroesophageal reflux disease)    • H/O Colon polyps    • H/O Uterine fibroid    • Heart murmur    • Hyperlipidemia    • Hypertension    • PONV (postoperative nausea and vomiting)         Past Surgical  History:   Procedure Laterality Date   • BLADDER SURGERY      Bladder lift   • BREAST RECONSTRUCTION, BREAST TISSUE EXPANDER INSERTION Right 2010   • BREAST SURGERY Right 2010    Mastectomy   •  SECTION  ,    • CHOLECYSTECTOMY     • COLONOSCOPY     • HERNIA REPAIR  2015    [facility]   • HYSTERECTOMY      Partial   • KNEE SURGERY Right    • LEG SURGERY Left     Broken   • MASTECTOMY Right    • GA TREAT TIBIAL SHAFT FX, INTRAMED IMPLANT Left 2017    Procedure: TIBIA INTRAMEDULLARY NAIL/DON INSERTION;  Surgeon: Romero Shanks MD;  Location: [facility];  Service: Orthopedics   • THORACIC DECOMPRESSION POSTERIOR FUSION WITH INSTRUMENTATION N/A 2017    Procedure: T6 costotransversectomy and decompression with T3 to  T9 posterior spinal fusion with instrumentation with Jennifer Gonzalez and Nael Dennis Cellsaver;  Surgeon: Quan Nayak MD;  Location: [facility];  Service:                        PT Assessment/Plan     Row Name 19 1520          PT Assessment    Assessment Comments  First land session completed today. Focused mostly on functional strengthening for transitional movements and core stability. Patient requires UE support from poles to complete balance toe taps. Provided vc's for patient to reach forward during sit to stand exercise in order to decrease reliance on UE support  -       User Key  (r) = Recorded By, (t) = Taken By, (c) = Cosigned By    Initials Name Provider Type    Ami Colon, PT Physical Therapist              Exercises     Row Name 19 1500             Subjective Comments    Subjective Comments  I have really enjoy my pool therapy. My goal with land is to improve my balance, stamina, and core strength  -KH         Subjective Pain    Able to rate subjective pain?  yes  -KH      Pre-Treatment Pain Level  2  -KH      Post-Treatment Pain Level  2  -KH         Total Minutes    49545 - PT Therapeutic Exercise Minutes  40  -KH          Exercise 1    Exercise Name 1  see exercise flowsheet  -BRADLY      Cueing 1  Verbal;Tactile  -BRADLY        User Key  (r) = Recorded By, (t) = Taken By, (c) = Cosigned By    Initials Name Provider Type    Ami Colon, PT Physical Therapist                         Time Calculation:   Start Time: 1345  Stop Time: 1430  Time Calculation (min): 45 min  Therapy Suggested Charges     Code   Minutes Charges    67696 (CPT®) Hc Pt Neuromusc Re Education Ea 15 Min      86459 (CPT®) Hc Pt Ther Proc Ea 15 Min 40 3    85969 (CPT®) Hc Gait Training Ea 15 Min      75876 (CPT®) Hc Pt Therapeutic Act Ea 15 Min      06818 (CPT®) Hc Pt Manual Therapy Ea 15 Min      70328 (CPT®) Hc Pt Ther Massage- Per 15 Min      82136 (CPT®) Hc Pt Iontophoresis Ea 15 Min      88091 (CPT®) Hc Pt Elec Stim Ea-Per 15 Min      43749 (CPT®) Hc Pt Ultrasound Ea 15 Min      35333 (CPT®) Hc Pt Self Care/Mgmt/Train Ea 15 Min      83938 (CPT®) Hc Pt Prosthetic (S) Train Initial Encounter, Each 15 Min      31535 (CPT®) Hc Orthotic(S) Mgmt/Train Initial Encounter, Each 15min      09677 (CPT®) Hc Pt Aquatic Therapy Ea 15 Min      91064 (CPT®) Hc Pt Orthotic(S)/Prosthetic(S) Encounter, Each 15 Min       (CPT®) Hc Pt Electrical Stim Unattended      Total  40 3        Therapy Charges for Today     Code Description Service Date Service Provider Modifiers Qty    60667712414 HC PT THER PROC EA 15 MIN 2/21/2019 Ami Graham, PT GP 3                    Ami Graham, PT  2/21/2019

## 2019-02-25 ENCOUNTER — INFUSION (OUTPATIENT)
Dept: ONCOLOGY | Facility: HOSPITAL | Age: 59
End: 2019-02-25

## 2019-02-25 ENCOUNTER — LAB (OUTPATIENT)
Dept: OTHER | Facility: HOSPITAL | Age: 59
End: 2019-02-25

## 2019-02-25 ENCOUNTER — HOSPITAL ENCOUNTER (OUTPATIENT)
Dept: PHYSICAL THERAPY | Facility: HOSPITAL | Age: 59
Setting detail: THERAPIES SERIES
Discharge: HOME | End: 2019-02-25

## 2019-02-25 ENCOUNTER — OFFICE VISIT (OUTPATIENT)
Dept: ONCOLOGY | Facility: CLINIC | Age: 59
End: 2019-02-25

## 2019-02-25 VITALS
HEIGHT: 61 IN | DIASTOLIC BLOOD PRESSURE: 89 MMHG | OXYGEN SATURATION: 99 % | RESPIRATION RATE: 16 BRPM | WEIGHT: 195.8 LBS | HEART RATE: 91 BPM | BODY MASS INDEX: 36.97 KG/M2 | TEMPERATURE: 97.3 F | SYSTOLIC BLOOD PRESSURE: 145 MMHG

## 2019-02-25 DIAGNOSIS — C50.811 MALIGNANT NEOPLASM OF OVERLAPPING SITES OF RIGHT BREAST IN FEMALE, ESTROGEN RECEPTOR NEGATIVE (HCC): ICD-10-CM

## 2019-02-25 DIAGNOSIS — C50.811 MALIGNANT NEOPLASM OF OVERLAPPING SITES OF RIGHT BREAST IN FEMALE, ESTROGEN RECEPTOR NEGATIVE (HCC): Primary | ICD-10-CM

## 2019-02-25 DIAGNOSIS — Z17.1 MALIGNANT NEOPLASM OF OVERLAPPING SITES OF RIGHT BREAST IN FEMALE, ESTROGEN RECEPTOR NEGATIVE (HCC): ICD-10-CM

## 2019-02-25 DIAGNOSIS — R53.1 GENERALIZED WEAKNESS: ICD-10-CM

## 2019-02-25 DIAGNOSIS — Z17.1 MALIGNANT NEOPLASM OF OVERLAPPING SITES OF RIGHT BREAST IN FEMALE, ESTROGEN RECEPTOR NEGATIVE (HCC): Primary | ICD-10-CM

## 2019-02-25 DIAGNOSIS — Z74.09 IMPAIRED MOBILITY AND ENDURANCE: Primary | ICD-10-CM

## 2019-02-25 LAB
ALBUMIN SERPL-MCNC: 4.4 G/DL (ref 3.5–5.2)
ALBUMIN/GLOB SERPL: 1.8 G/DL
ALP SERPL-CCNC: 61 U/L (ref 39–117)
ALT SERPL W P-5'-P-CCNC: 49 U/L (ref 1–33)
ANION GAP SERPL CALCULATED.3IONS-SCNC: 11.3 MMOL/L
AST SERPL-CCNC: 25 U/L (ref 1–32)
BASOPHILS # BLD AUTO: 0.02 10*3/MM3 (ref 0–0.2)
BASOPHILS NFR BLD AUTO: 0.6 % (ref 0–1.5)
BILIRUB SERPL-MCNC: 0.2 MG/DL (ref 0.1–1.2)
BUN BLD-MCNC: 19 MG/DL (ref 6–20)
BUN/CREAT SERPL: 20.9 (ref 7–25)
CALCIUM SPEC-SCNC: 9 MG/DL (ref 8.6–10.5)
CHLORIDE SERPL-SCNC: 106 MMOL/L (ref 98–107)
CO2 SERPL-SCNC: 25.7 MMOL/L (ref 22–29)
CREAT BLD-MCNC: 0.91 MG/DL (ref 0.57–1)
DEPRECATED RDW RBC AUTO: 66.1 FL (ref 37–54)
EOSINOPHIL # BLD AUTO: 0.11 10*3/MM3 (ref 0–0.4)
EOSINOPHIL NFR BLD AUTO: 3.1 % (ref 0.3–6.2)
ERYTHROCYTE [DISTWIDTH] IN BLOOD BY AUTOMATED COUNT: 18.4 % (ref 12.3–15.4)
GFR SERPL CREATININE-BSD FRML MDRD: 63 ML/MIN/1.73
GLOBULIN UR ELPH-MCNC: 2.4 GM/DL
GLUCOSE BLD-MCNC: 102 MG/DL (ref 65–99)
HCT VFR BLD AUTO: 37.2 % (ref 34–46.6)
HGB BLD-MCNC: 12.4 G/DL (ref 12–15.9)
IMM GRANULOCYTES # BLD AUTO: 0.03 10*3/MM3 (ref 0–0.05)
IMM GRANULOCYTES NFR BLD AUTO: 0.8 % (ref 0–0.5)
LYMPHOCYTES # BLD AUTO: 0.88 10*3/MM3 (ref 0.7–3.1)
LYMPHOCYTES NFR BLD AUTO: 24.9 % (ref 19.6–45.3)
MAGNESIUM SERPL-MCNC: 1.9 MG/DL (ref 1.6–2.6)
MCH RBC QN AUTO: 33.1 PG (ref 26.6–33)
MCHC RBC AUTO-ENTMCNC: 33.3 G/DL (ref 31.5–35.7)
MCV RBC AUTO: 99.2 FL (ref 79–97)
MONOCYTES # BLD AUTO: 0.47 10*3/MM3 (ref 0.1–0.9)
MONOCYTES NFR BLD AUTO: 13.3 % (ref 5–12)
NEUTROPHILS # BLD AUTO: 2.02 10*3/MM3 (ref 1.4–7)
NEUTROPHILS NFR BLD AUTO: 57.3 % (ref 42.7–76)
NRBC BLD AUTO-RTO: 0 /100 WBC (ref 0–0)
PHOSPHATE SERPL-MCNC: 2.8 MG/DL (ref 2.5–4.5)
PLATELET # BLD AUTO: 252 10*3/MM3 (ref 140–450)
PMV BLD AUTO: 10.6 FL (ref 6–12)
POTASSIUM BLD-SCNC: 4.5 MMOL/L (ref 3.5–5.2)
PROT SERPL-MCNC: 6.8 G/DL (ref 6–8.5)
RBC # BLD AUTO: 3.75 10*6/MM3 (ref 3.77–5.28)
SODIUM BLD-SCNC: 143 MMOL/L (ref 136–145)
WBC NRBC COR # BLD: 3.53 10*3/MM3 (ref 3.4–10.8)

## 2019-02-25 PROCEDURE — 83735 ASSAY OF MAGNESIUM: CPT | Performed by: INTERNAL MEDICINE

## 2019-02-25 PROCEDURE — 25010000002 DENOSUMAB 120 MG/1.7ML SOLUTION: Performed by: INTERNAL MEDICINE

## 2019-02-25 PROCEDURE — 97110 THERAPEUTIC EXERCISES: CPT | Performed by: PHYSICAL THERAPIST

## 2019-02-25 PROCEDURE — 99214 OFFICE O/P EST MOD 30 MIN: CPT | Performed by: INTERNAL MEDICINE

## 2019-02-25 PROCEDURE — 80053 COMPREHEN METABOLIC PANEL: CPT | Performed by: INTERNAL MEDICINE

## 2019-02-25 PROCEDURE — 96372 THER/PROPH/DIAG INJ SC/IM: CPT | Performed by: INTERNAL MEDICINE

## 2019-02-25 PROCEDURE — 85025 COMPLETE CBC W/AUTO DIFF WBC: CPT | Performed by: INTERNAL MEDICINE

## 2019-02-25 PROCEDURE — 36415 COLL VENOUS BLD VENIPUNCTURE: CPT

## 2019-02-25 PROCEDURE — 84100 ASSAY OF PHOSPHORUS: CPT | Performed by: INTERNAL MEDICINE

## 2019-02-25 RX ADMIN — DENOSUMAB 120 MG: 120 INJECTION SUBCUTANEOUS at 11:44

## 2019-02-25 NOTE — PROGRESS NOTES
Subjective .     REASONS FOR FOLLOWUP:    1. Stage Ib ([id number]) right breast cancer: Diagnosed May 2010 with excisional biopsy for invasive ductal carcinoma, 1.3 cm, grade 2, ER 90%, CA 80%, HER-2/peter negative (1+ IHC).  Subsequent right mastectomy in July 2010 with no residual breast malignancy, 1/5 sentinel lymph node with micrometastasis (0.25 mm).  Treated in the [facility] with adjuvant AC ×4 cycles in 2010 (no taxanes administered due to underlying Charcot-Saloni-Tooth with peripheral neuropathy).  Adjuvant Femara (postmenopausal) initiated October 2010 with plan to treat ×10 years.  Genetic testing reportedly negative.  Developed osteopenia treated with Prolia beginning 2/27/13.  2. Recurrent/metastatic disease identified 10/8/17 involving thoracic spine with cord compression at T6, lumbosacral involvement, sternal and right sternoclavicular involvement.  Femara discontinued.  Radiation administered (in the [facility] system) to the thoracic spine beginning 10/19/17 treating T3-T9 to a dose of 24 gray in 6 fractions.  3. Evaluation with MRI 12/8/17 showing persistent T6 cord compression with persistent neurologic compromise requiring surgical treatment 12/11/17 with T6 laminectomy/corpectomy and T3-T9 fusion.  Pathology with metastatic carcinoma of breast origin, ER negative, CA negative, HER-2/peter negative (1+ IHC).  4. Right pulmonary embolism 10/21/17 treated with Lovenox 1 mg/kg (100 mg) every 12 hours.  No evidence of DVT.  Lovenox held due to right gluteus hematoma 3/23/18 through 4/6/18.  Transitioned to oral Eliquis 5mg bid 1/28/19 (as of 2/25/19, patient still using previous supply of Lovenox).  5. Cancer related pain previously on Duragesic 50 µg patch every 72 hours and Dilaudid 4 mg as needed for breakthrough pain.  Narcotics subsequently discontinued with improvement in pain in January 2018.  6. Radiation therapy to L3 dural metastasis and left humerus initiated 1/15/18 (10 fractions),  completed 1/26/18  7. Monthly Xgeva initiated 1/23/18  8. Mild hypercalcemia of malignancy  9. Initiation of palliative oral single agent Xeloda 2/7/18 (2000 mg a.m., 1500 mg p.m. for 14/21 days).  10. 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  In reviewing prior CT scans, has been present for some time.  Monitor for now, if it enlarges consider surgical excision.  11. Cumulative side effects from Xeloda with fatigue, anorexia, diarrhea, increased tearing.  Alteration in dose/schedule with cycle 11 to 1500 mg twice a day for 7 days on followed by 7 days off.    HISTORY OF PRESENT ILLNESS:  The patient is a 58 y.o. year old female who is here for follow-up with the above-mentioned history.    History of Present Illness the patient returns today in follow-up due to begin cycle 17 Xeloda and also continuing on monthly Xgeva that is due today.  The patient continues to tolerate treatment extremely well with some mild grade 1/2 hand-foot symptoms.  She is experiencing some slight skin cracking and fissure of her distal fingers.  She developed some pain in her lateral left foot and was seen recently by orthopedics with x-ray showing evidence of an old fracture around her left fifth metatarsal.  This is felt to have occurred at the time she had her lower extremity fracture at the nursing facility required ORIF.  Apparently she may require a procedure at some point in the future to correct this.  She continues to ambulate with the use of stabilizing poles.  She reports normal appetite.  She denies any mucositis.  She continues to use emollient cream frequently during the day and does wear socks at night however has not started to wear gloves despite our previous conversation.  At the last visit we did discuss transitioning from Lovenox to Eliquis  however the patient is still using her previous supply of Lovenox and has not yet transitioned.    Past Medical History:   Diagnosis Date   • Acute pulmonary embolism, cancer-related 10/2017   • Allergic rhinitis    • Arthritis     Right knee; left shoulder   • Cancer (CMS/[facility]) 2010    Right breast   • Cancer (CMS/[facility]) 10/2017    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   • Charcot-Saloni-Tooth disease    • CPAP (continuous positive airway pressure) dependence    • GERD (gastroesophageal reflux disease)    • H/O Colon polyps    • H/O Uterine fibroid    • Heart murmur    • Hyperlipidemia    • Hypertension    • PONV (postoperative nausea and vomiting)      Past Surgical History:   Procedure Laterality Date   • BLADDER SURGERY      Bladder lift   • BREAST RECONSTRUCTION, BREAST TISSUE EXPANDER INSERTION Right 2010   • BREAST SURGERY Right 2010    Mastectomy   •  SECTION  ,    • CHOLECYSTECTOMY     • COLONOSCOPY     • HERNIA REPAIR  2015    [facility]   • HYSTERECTOMY      Partial   • KNEE SURGERY Right    • LEG SURGERY Left     Broken   • MASTECTOMY Right    • ID TREAT TIBIAL SHAFT FX, INTRAMED IMPLANT Left 2017    Procedure: TIBIA INTRAMEDULLARY NAIL/DON INSERTION;  Surgeon: Romero Shanks MD;  Location: [facility];  Service: Orthopedics   • THORACIC DECOMPRESSION POSTERIOR FUSION WITH INSTRUMENTATION N/A 2017    Procedure: T6 costotransversectomy and decompression with T3 to  T9 posterior spinal fusion with instrumentation with Jennifer Gonzalez and Nael Wilkes;  Surgeon: Quan Nayak MD;  Location: [facility];  Service:        ONCOLOGIC HISTORY:  (History from previous dates can be found in the separate document.)    Current Outpatient Medications on File Prior to Visit   Medication Sig Dispense Refill   • acetaminophen (TYLENOL) 500 MG tablet Take 500 mg by mouth Every 6 (Six) Hours As Needed for Mild Pain .     • apixaban (ELIQUIS) 5 MG tablet tablet Take 1 tablet  by mouth Every 12 (Twelve) Hours. 60 tablet 5   • calcium carbonate (OS-CARY) 600 MG tablet Take 600 mg by mouth 2 (Two) Times a Day With Meals.     • capecitabine (XELODA) 500 MG chemo tablet Take 3 tabs (1500 mg) po twice a day for 7 days on then 7 days off. 84 tablet 3   • cholecalciferol (VITAMIN D3) 1000 units tablet Take 1,000 Units by mouth Daily.     • cyclobenzaprine (FLEXERIL) 10 MG tablet Take 1 tablet by mouth 3 (Three) Times a Day As Needed for Muscle Spasms. 30 tablet 3   • diazePAM (VALIUM) 10 MG tablet Take 1 tablet by mouth Every 12 (Twelve) Hours As Needed for Anxiety. 10 tablet 1   • diphenoxylate-atropine (LOMOTIL) 2.5-0.025 MG per tablet Take 1 tablet by mouth 4 (Four) Times a Day As Needed for Diarrhea. 60 tablet 2   • FLONASE ALLERGY RELIEF 50 MCG/ACT nasal spray 2 sprays into each nostril daily.  11   • gabapentin (NEURONTIN) 300 MG capsule TAKE 1 CAPSULE BY MOUTH THREE TIMES DAILY 90 capsule 0   • HYDROmorphone (DILAUDID) 4 MG tablet Take 1 tablet by mouth Every 4 (Four) Hours As Needed for Moderate Pain . 60 tablet 0   • mesalamine (LIALDA) 1.2 g EC tablet TAKE 1 TABLET BY MOUTH TWICE DAILY 180 tablet 0   • nitrofurantoin (MACRODANTIN) 100 MG capsule Take 1 capsule by mouth 2 (Two) Times a Day. 20 capsule 0   • nitrofurantoin, macrocrystal-monohydrate, (MACROBID) 100 MG capsule Take 1 capsule by mouth 2 (Two) Times a Day. 10 capsule 0   • omeprazole (PriLOSEC) 40 MG capsule TAKE 1 CAPSULE DAILY 90 capsule 2   • ondansetron ODT (ZOFRAN-ODT) 8 MG disintegrating tablet Take 1 tablet by mouth Every 8 (Eight) Hours As Needed for Nausea or Vomiting. 30 tablet 1   • phenazopyridine (PYRIDIUM) 200 MG tablet Take 200 mg by mouth 3 (Three) Times a Day As Needed for bladder spasms.     • prochlorperazine (COMPAZINE) 10 MG tablet Take 1 tablet by mouth Every 6 (Six) Hours As Needed for Nausea or Vomiting. 30 tablet 0   • sennosides-docusate sodium (SENOKOT-S) 8.6-50 MG tablet Take 2 tablets by mouth 2  (Two) Times a Day. 90 tablet 2   • traMADol (ULTRAM) 50 MG tablet Take 1 tablet by mouth Every 8 (Eight) Hours As Needed for Moderate Pain . 90 tablet 2   • trimethoprim (TRIMPEX) 100 MG tablet Take 100 mg by mouth Daily.  2   • Tuberculin PPD (APLISOL ID) Inject  into the skin.     • Unable to find SWISH AND SPIT 5 ML PO Q 2 H PRN  0     Current Facility-Administered Medications on File Prior to Visit   Medication Dose Route Frequency Provider Last Rate Last Dose   • [COMPLETED] denosumab (XGEVA) injection 120 mg  120 mg Subcutaneous Once Ubaldo Hancock Jr., MD   120 mg at 02/25/19 1144       ALLERGIES:     Allergies   Allergen Reactions   • Hydrocodone Nausea Only   • Morphine And Related Nausea And Vomiting     Social History     Socioeconomic History   • Marital status:      Spouse name: Murray   • Number of children: 3   • Years of education: College   • Highest education level: Not on file   Social Needs   • Financial resource strain: Not on file   • Food insecurity - worry: Not on file   • Food insecurity - inability: Not on file   • Transportation needs - medical: Not on file   • Transportation needs - non-medical: Not on file   Occupational History     Employer: [employer]     Employer: RETIRED   Tobacco Use   • Smoking status: Never Smoker   • Smokeless tobacco: Never Used   Substance and Sexual Activity   • Alcohol use: Yes     Comment: social   • Drug use: No   • Sexual activity: Defer   Other Topics Concern   • Not on file   Social History Narrative   • Not on file     Family History   Problem Relation Age of Onset   • Heart disease Mother    • Hyperlipidemia Mother    • Hypertension Mother    • Diabetes Father    • Charcot-Saloni-Tooth disease Father    • Heart disease Father    • Hypertension Father    • Heart failure Father    • Diabetes Sister    • Heart disease Sister    • Hypertension Sister    • Heart disease Maternal Aunt    • Scoliosis Maternal Aunt    • Heart disease Maternal Uncle    •  Diabetes Maternal Grandmother    • Heart disease Maternal Grandmother    • Hypertension Maternal Grandmother    • Uterine cancer Maternal Grandmother    • Heart disease Maternal Grandfather      Review of Systems   Constitutional: Positive for fatigue. Negative for activity change, appetite change, fever and unexpected weight change.   HENT: Negative for congestion, mouth sores, nosebleeds, sore throat and voice change.    Eyes: Negative for discharge.   Respiratory: Negative for cough, shortness of breath and wheezing.    Cardiovascular: Negative for chest pain, palpitations and leg swelling.   Gastrointestinal: Negative for abdominal distention, abdominal pain, blood in stool, constipation, diarrhea, nausea and vomiting.   Endocrine: Negative for cold intolerance and heat intolerance.   Genitourinary: Negative for difficulty urinating, dysuria, frequency and hematuria.   Musculoskeletal: Positive for arthralgias. Negative for back pain, joint swelling and myalgias.   Skin: Positive for rash. Negative for wound.   Neurological: Negative for dizziness, syncope, weakness, light-headedness, numbness and headaches.   Hematological: Negative for adenopathy. Does not bruise/bleed easily.   Psychiatric/Behavioral: Positive for sleep disturbance. Negative for confusion. The patient is not nervous/anxious.          Objective      Vitals:    02/25/19 1038   BP: 145/89   Pulse: 91   Resp: 16   Temp: 97.3 °F (36.3 °C)   SpO2: 99%      Current Status 2/25/2019   ECOG score 1   Pain 0/10    Physical Exam   Constitutional: She is oriented to person, place, and time. She appears well-developed and well-nourished.   The patient is ambulating with the use of stabilizing poles   HENT:   Mouth/Throat: Oropharynx is clear and moist.   Eyes: Conjunctivae are normal.   Neck: No thyromegaly present.   Cardiovascular: Normal rate and regular rhythm. Exam reveals no gallop and no friction rub.   No murmur heard.  Pulmonary/Chest: Breath  sounds normal. No respiratory distress.   Abdominal: Soft. Bowel sounds are normal. She exhibits no distension. There is no tenderness.   Musculoskeletal: She exhibits edema.   Lower extremity braces in place.  Trace bilateral lower extremity edema.   Lymphadenopathy:        Head (right side): No submandibular adenopathy present.     She has no cervical adenopathy.     She has no axillary adenopathy.        Right: No inguinal and no supraclavicular adenopathy present.        Left: No inguinal and no supraclavicular adenopathy present.   Neurological: She is alert and oriented to person, place, and time. No cranial nerve deficit.   Bilateral lower extremity strength, 4+/5   Skin: Skin is warm and dry. No rash noted.   Mild erythema involving the palms of the hands.  Mild degree of skin cracking with minor peeling   Psychiatric: She has a normal mood and affect. Her behavior is normal.       RECENT LABS:  Hematology WBC   Date Value Ref Range Status   02/25/2019 3.53 3.40 - 10.80 10*3/mm3 Final     RBC   Date Value Ref Range Status   02/25/2019 3.75 (L) 3.77 - 5.28 10*6/mm3 Final     Hemoglobin   Date Value Ref Range Status   02/25/2019 12.4 12.0 - 15.9 g/dL Final     Hematocrit   Date Value Ref Range Status   02/25/2019 37.2 34.0 - 46.6 % Final     Platelets   Date Value Ref Range Status   02/25/2019 252 140 - 450 10*3/mm3 Final        Lab Results   Component Value Date    GLUCOSE 102 (H) 02/25/2019    BUN 19 02/25/2019    CREATININE 0.91 02/25/2019    EGFRIFNONA 63 02/25/2019    EGFRIFAFRI 80 09/25/2017    BCR 20.9 02/25/2019    K 4.5 02/25/2019    CO2 25.7 02/25/2019    CALCIUM 9.0 02/25/2019    PROTENTOTREF 6.4 09/25/2017    ALBUMIN 4.40 02/25/2019    LABIL2 2.2 09/25/2017    AST 25 02/25/2019    ALT 49 (H) 02/25/2019       Assessment/Plan      1. Previous Stage Ib ([id number]) right breast cancer:  · Diagnosed May 2010 with excisional biopsy for invasive ductal carcinoma, 1.3 cm, grade 2, ER 90%, KS 80%,  HER-2/peter negative (1+ IHC).    · Subsequent right mastectomy in July 2010 with no residual breast malignancy, 1/5 sentinel lymph node with micrometastasis (0.25 mm).    · Treated in the [facility] with adjuvant AC ×4 cycles in 2010 (no taxanes administered due to underlying Charcot-Saloni-Tooth with peripheral neuropathy).    · Adjuvant Femara (postmenopausal) initiated October 2010 with plan to treat ×10 years.    · Genetic testing reportedly negative.    · Developed osteopenia treated with Prolia beginning 2/27/13. Subsequently discontinued due to identification of metastatic disease.  2. Recurrent/metastatic disease identified 10/8/17:  · Disease involving thoracic spine with cord compression at T6, lumbosacral involvement, sternal and right sternoclavicular involvement.    · Femara discontinued in 10/2017.    · Radiation administered (in the [facility] system) to the thoracic spine beginning 10/19/17 treating T3-T9 to a dose of 24 gray in 6 fractions.  · Evaluation with MRI 12/8/17 showing persistent T6 cord compression with persistent neurologic compromise requiring surgical treatment 12/11/17 with T6 laminectomy/corpectomy and T3-T9 fusion.  Pathology with metastatic carcinoma of breast origin, ER negative, MD negative, HER-2/peter negative (1+ IHC).  · Additional staging evaluation 12/8/17 with no evidence of visceral metastatic disease, bone scan showing involvement of thoracic spine, sternum, left humerus, mid frontal bone.  No plane film correlate of left humerus lesion.  MRI lumbar spine with small intradural L3 metastasis.  CA 15-3 12/6/17- 17.  · Palliative radiation therapy to L3 dural metastasis and left humerus initiated 1/15/18 (10 fractions), completed 1/26/18.  · Hypercalcemia of malignancy with calcium in the 10-11 range.  · Initiation of monthly Xgeva 1/23/18.  · Baseline CT scan 1/30/18 with no evidence of visceral involvement.  Cluster of nodular opacities in the right lower lobe suspected to be  infectious or related to bronchiolitis. Bone scan 1/30/18 showed postsurgical change in the thoracic spine, stable uptake in the frontal bone, no new areas of disease.  · Initiation of palliative oral single agent Xeloda 2/7/18 2000 mg a.m., 1500 mg p.m. for 14/21 days.   · Following 3 cycles xeloda, bone scan 4/4/18 showed no change from the prior study.  CT scan 4/4/18 showed a small pericardial effusion of unclear significance as well as a subcutaneous nodule in the right lateral chest wall.  Subsequent evaluation with echocardiogram 4/17/18 showed no evidence of pericardial effusion.  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  We discussed the possibility of surgical excision of the right subcutaneous chest wall lesion for more definitive diagnosis.  Reviewed previous CT images dating back to 12/8/17 and the lesion was present even at that time measuring around 1.7 cm although not commented on in the radiology report.  As this appears to be an indolent low-grade process unrelated to her breast cancer, recommendee foregoing surgical excision at this time and monitoring this area on future scans.  The patient agreed.    · Following 6 cycles of Xeloda, CT 6/6/18  showed stable findings, no evidence of progressive disease.  There was a comment regarding subcutaneous abnormality in the anterior abdominal wall and this was related to Lovenox injection sites.  Bone scan 6/6/18 showed no interval change.   · CT scan and bone scan 8/13/18 following 9 cycles of Xeloda showed stable findings with no evidence of significant visceral metastases.  Her bone lesions appear stable on bone scan.  The spindle cell neoplasm in her right chest wall actually decreased in size from 2 cm down to 1.6 cm.    · The patient experienced some symptoms of diarrhea, anorexia, generalized weakness  during cycle 9 Xeloda it was unclear whether this was related to a viral gastroenteritis or toxicity from treatment.  Symptoms recurred during cycle 10 and treatment was cut short by 2 days.  Symptoms attributed to Xeloda.  With cycle 11, dose and schedule altered to 1500 mg twice daily for 7 days on followed by 7 days off .  · CT scan and bone scan from 10/31/18 following 12 cycles of Xeloda showed stable disease.    · CT scans and bone scan 1/21/19 following 15 cycles of Xeloda showed stable findings.  We discussed pursuing repeat CT scan and bone scan after 3 cycles (end of cycle 18).  · The patient returns today in follow-up due for cycle 17 of Xeloda along with monthly Xgeva.  She continues to tolerate treatment reasonably well, does have grade 1/2 hand-foot symptoms.  This will be discussed further below.  Symptoms are manageable.  She had apparently experienced a fracture of her left fifth metatarsal when she had her injury at the nursing home that caused her lower extremity fracture.  Is felt that this may require her seizure at some point however it is unclear when this may occur.  Patient will continue her current treatment and I will see her back in 4 weeks when she will be due to begin cycle 18.  At the end of that cycle we will repeat her scans.  Clinically she is very stable with no evidence of disease progression.  3. Right hip/gluteal hematoma:  · The patient had developed considerable pain in the right hip and underwent MRI 3/22/18 visit showed a 5.7 x 4.3 x 11 cm hematoma in the right gluteal region.  · Lovenox was held 3/23 through 4/6/18.  Resolution of pain, Lovenox was resumed. Hematoma likely occurred due to minimal trauma from transfer out of her wheelchair.   4. Right pulmonary embolism:  · Diagnosed on CT angiogram 10/21/17 involving small right lower lobe pulmonary artery.  Lower extremity Dopplers negative.  · Bilateral lower extremity Dopplers negative again 12/5/17.  · Continuing on  Lovenox 1 mg/kg (80 mg) subcutaneous injection every 12 hours.  The patient has not experienced any recent bleeding issues.  Discussed transition to Eliquis 5 mg twice daily on 1/28/19 and prescription was provided.  The patient however is continuing to use up her previous supply of Lovenox and has not yet started Eliquis.  When she runs out of her Lovenox, she will transition as planned.  5. Cancer related pain:  · Previously receiving Duragesic 50 µg patch every 72 hours along with Dilaudid 4 mg as needed for breakthrough pain  · The patient's pain improved over time and she was able to discontinue both Duragesic and Dilaudid in the interval.  · Patient does take occasional Flexeril at bedtime due to back spasm/pain when she has been more active.  · Right hip pain as noted above due to right gluteal hematoma, previously prescribed Dilaudid 4 mg every 4 hours as needed #60 with no refill.  Pain from hematoma resolved.  · The patient does have some occasional aggravation of her chronic back pain with significant rehabilitation activity and requires an occasional dose of Dilaudid.    6. Chemotherapy-induced diarrhea with subsequent C. difficile colitis:  · The patient developed initial diarrhea related to Xeloda at regular dosing.  · Symptoms improved on reduced dose Xeloda  · Flare of symptoms in October 2018 with apparent finding of C. difficile colitis by GI, treated with course of oral vancomycin with improvement in symptoms.  7. Traumatic left tibia/fibular fracture:  · Status post ORIF 12/6/17  · Specimen was sent for pathologic review, negative for evidence of malignancy  8. Hypercalcemia:  · Suspect hypercalcemia of malignancy, calcium in  10-11 range previously.  · Calcium normalized following initiation of monthly Xgeva on 1/23/18.  9. Chemotherapy-induced mucositis:  · Patient had a minimal degree of mucositis with cycle 2.  The patient has magic mouthwash to use as needed.  No subsequent  mucositis.  10. Recurrent UTI, bladder wall thickening on CT:  · Patient had an enterococcal UTI on 3/2/18 sensitive to nitrofurantoin and received treatment, unclear how long.  · Recurrent UTI 3/20/18 with urine culture growing Klebsiella, initially treated with nitrofurantoin, transitioned to Levaquin.  · CT 4/4/18 with diffuse bladder wall thickening with increased nodular thickening at the left base.  Referral to urogynecology Dr. May Johnson.  She was placed on a prophylactic dose of trimethoprim 100 mg daily, bladder wall thickening felt to be related to recent recurrent urinary tract infections.  · Patient with urinary symptoms, treated with course of Macrobid at the end of December 2018, urine culture however was negative 12/31/18.  11.   Mobility:  · The patient underwent an intensive course of rehabilitation at [facility].  She graduated from her outpatient course November 2018.  · Overall the patient has improved dramatically in terms of mobility, now able to ambulate without the use of a walker and is achieving some independent activity.  12.  Depression:  · The patient weaned herself off of Cymbalta and reports that her symptoms remain stable.  13. Hand-foot syndrome secondary to Xeloda:  · Agent continues with frequent application of emollient cream to the hands and feet  · She is wearing socks at night, encouraged her again to wear gloves as well.  We discussed this at the last visit however she did not do as requested for unclear reasons.  14. Elevated liver function studies:  · Liver function studies increased 8/20/19 with ALT 98, AST 70, normal total bilirubin.  · Negative viral hepatitis A, B, and C panel 8/23/18  · Likely related to hepatic steatosis seen on CT, no definitive evidence of metastatic liver lesions.   · Subsequent improvement in LFTs  · Today, slight elevation in ALT at 49, will continue to monitor.  15. Chemotherapy induced leukopenia:  · Patient with mild leukopenia secondary to  Xeloda, WBC 3.53, ANC adequate at 2.02.    Plan:  1. On 2/27/19, begin cycle 17 Xeloda continuing with 1500 mg twice a day 7 days on followed by 7 days off  2. Monthly Xgeva today  3. Continue Lovenox 80 mg every 12 hours.  Addition to Eliquis 5 mg twice daily once Lovenox supply is exhausted (patient already has Eliquis).  4. Increase use of emollient cream on the hands and wear gloves at night  5. In 4 weeks MD visit with CBC, CMP, magnesium, phosphorus.  The patient will be due for Xgeva.  She will also be due to begin cycle 18 [employer].  Plan repeat CT scan and bone scan at the end of cycle 18.

## 2019-02-25 NOTE — THERAPY TREATMENT NOTE
Outpatient Physical Therapy Ortho Treatment Note  [facility]     Patient Name: Martha Davey  : 1960  MRN: [id number]  Today's Date: 2019      Visit Date: 2019    Visit Dx:    ICD-10-CM ICD-9-CM   1. Impaired mobility and endurance Z74.09 V49.89   2. Generalized weakness R53.1 780.79       Patient Active Problem List   Diagnosis   • Malignant neoplasm of overlapping sites of right breast in female, estrogen receptor negative (CMS/HCC)   • Hematuria   • Hyperlipidemia   • Hypertension   • Hereditary sensorimotor neuropathy   • Hyperglycemia   • MARIA M on CPAP   • Gastroesophageal reflux disease   • Colon polyp   • Diverticulitis of colon with perforation   • Foot pain   • Osteoarthritis of knee   • Leukocytosis   • Osteoarthritis of shoulder   • Senile osteopenia   • Long term current use of aromatase inhibitor   • Chronic right-sided thoracic back pain   • Closed fracture of left fibula and tibia   • Closed displaced spiral fracture of shaft of left tibia   • Cancer associated pain   • Cord compression (CMS/HCC)   • Drug induced constipation   • Closed T6 spinal fracture (CMS/HCC)   • History of pulmonary embolism   • S/P ORIF (open reduction internal fixation) fracture   • Bone metastases (CMS/HCC)   • Surgery follow-up examination   • Dysuria   • Hypercalcemia of malignancy   • Pericardial effusion   • Other fatigue        Past Medical History:   Diagnosis Date   • Acute pulmonary embolism, cancer-related 10/2017   • Allergic rhinitis    • Arthritis     Right knee; left shoulder   • Cancer (CMS/HCC) 2010    Right breast   • Cancer (CMS/HCC) 10/2017    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   • Charcot-Saloni-Tooth disease    • CPAP (continuous positive airway pressure) dependence    • GERD (gastroesophageal reflux disease)    • H/O Colon polyps    • H/O Uterine fibroid    • Heart murmur    • Hyperlipidemia    • Hypertension    • PONV (postoperative nausea and vomiting)         Past Surgical  History:   Procedure Laterality Date   • BLADDER SURGERY      Bladder lift   • BREAST RECONSTRUCTION, BREAST TISSUE EXPANDER INSERTION Right 2010   • BREAST SURGERY Right 2010    Mastectomy   •  SECTION  ,    • CHOLECYSTECTOMY     • COLONOSCOPY     • HERNIA REPAIR  2015    [facility]   • HYSTERECTOMY      Partial   • KNEE SURGERY Right    • LEG SURGERY Left     Broken   • MASTECTOMY Right    • NV TREAT TIBIAL SHAFT FX, INTRAMED IMPLANT Left 2017    Procedure: TIBIA INTRAMEDULLARY NAIL/DON INSERTION;  Surgeon: Romero Shanks MD;  Location: [facility];  Service: Orthopedics   • THORACIC DECOMPRESSION POSTERIOR FUSION WITH INSTRUMENTATION N/A 2017    Procedure: T6 costotransversectomy and decompression with T3 to  T9 posterior spinal fusion with instrumentation with Jennifer Gonzalez and Nael Dennis Cellsaver;  Surgeon: Quan Nayak MD;  Location: [facility];  Service:                        PT Assessment/Plan     Row Name 19 1429          PT Assessment    Assessment Comments  Patient able to demonstrate slight improvement in LE strength on machines today. Continued with use of balance pole during toe taps and added ankle stability on balance discs (with intermittent UE support).   -       User Key  (r) = Recorded By, (t) = Taken By, (c) = Cosigned By    Initials Name Provider Type    Ami Colon, PT Physical Therapist              Exercises     Row Name 19 1400             Subjective Comments    Subjective Comments  I was a little sore after our last session but not too bad  -         Subjective Pain    Able to rate subjective pain?  yes  -KH      Pre-Treatment Pain Level  1  -KH      Post-Treatment Pain Level  1  -KH         Total Minutes    97764 - PT Therapeutic Exercise Minutes  40  -KH         Exercise 1    Exercise Name 1  see exercise flowsheet  -KH      Cueing 1  Verbal;Tactile  -        User Key  (r) = Recorded By, (t) = Taken By, (c) =  Cosigned By    Initials Name Provider Type    Ami Colon, PT Physical Therapist                         Time Calculation:   Start Time: 1345  Stop Time: 1430  Time Calculation (min): 45 min  Therapy Suggested Charges     Code   Minutes Charges    03020 (CPT®) Hc Pt Neuromusc Re Education Ea 15 Min      35415 (CPT®) Hc Pt Ther Proc Ea 15 Min 40 3    15244 (CPT®) Hc Gait Training Ea 15 Min      29100 (CPT®) Hc Pt Therapeutic Act Ea 15 Min      13747 (CPT®) Hc Pt Manual Therapy Ea 15 Min      18652 (CPT®) Hc Pt Ther Massage- Per 15 Min      98442 (CPT®) Hc Pt Iontophoresis Ea 15 Min      95576 (CPT®) Hc Pt Elec Stim Ea-Per 15 Min      48372 (CPT®) Hc Pt Ultrasound Ea 15 Min      76916 (CPT®) Hc Pt Self Care/Mgmt/Train Ea 15 Min      40085 (CPT®) Hc Pt Prosthetic (S) Train Initial Encounter, Each 15 Min      88126 (CPT®) Hc Orthotic(S) Mgmt/Train Initial Encounter, Each 15min      41534 (CPT®) Hc Pt Aquatic Therapy Ea 15 Min      61285 (CPT®) Hc Pt Orthotic(S)/Prosthetic(S) Encounter, Each 15 Min       (CPT®) Hc Pt Electrical Stim Unattended      Total  40 3        Therapy Charges for Today     Code Description Service Date Service Provider Modifiers Qty    13481487428 HC PT THER PROC EA 15 MIN 2/25/2019 Ami Graham, PT GP 3                    Ami Graham, PT  2/25/2019

## 2019-02-27 ENCOUNTER — HOSPITAL ENCOUNTER (OUTPATIENT)
Dept: PHYSICAL THERAPY | Facility: HOSPITAL | Age: 59
Setting detail: THERAPIES SERIES
Discharge: HOME | End: 2019-02-27

## 2019-02-27 DIAGNOSIS — Z74.09 IMPAIRED MOBILITY AND ENDURANCE: Primary | ICD-10-CM

## 2019-02-27 DIAGNOSIS — R53.1 GENERALIZED WEAKNESS: ICD-10-CM

## 2019-02-27 PROCEDURE — 97110 THERAPEUTIC EXERCISES: CPT | Performed by: PHYSICAL THERAPIST

## 2019-02-27 NOTE — THERAPY TREATMENT NOTE
Outpatient Physical Therapy Ortho Treatment Note  [facility]     Patient Name: Martha Davey  : 1960  MRN: [id number]  Today's Date: 2019      Visit Date: 2019    Visit Dx:    ICD-10-CM ICD-9-CM   1. Impaired mobility and endurance Z74.09 V49.89   2. Generalized weakness R53.1 780.79       Patient Active Problem List   Diagnosis   • Malignant neoplasm of overlapping sites of right breast in female, estrogen receptor negative (CMS/HCC)   • Hematuria   • Hyperlipidemia   • Hypertension   • Hereditary sensorimotor neuropathy   • Hyperglycemia   • MARIA M on CPAP   • Gastroesophageal reflux disease   • Colon polyp   • Diverticulitis of colon with perforation   • Foot pain   • Osteoarthritis of knee   • Leukocytosis   • Osteoarthritis of shoulder   • Senile osteopenia   • Long term current use of aromatase inhibitor   • Chronic right-sided thoracic back pain   • Closed fracture of left fibula and tibia   • Closed displaced spiral fracture of shaft of left tibia   • Cancer associated pain   • Cord compression (CMS/HCC)   • Drug induced constipation   • Closed T6 spinal fracture (CMS/HCC)   • History of pulmonary embolism   • S/P ORIF (open reduction internal fixation) fracture   • Bone metastases (CMS/HCC)   • Surgery follow-up examination   • Dysuria   • Hypercalcemia of malignancy   • Pericardial effusion   • Other fatigue        Past Medical History:   Diagnosis Date   • Acute pulmonary embolism, cancer-related 10/2017   • Allergic rhinitis    • Arthritis     Right knee; left shoulder   • Cancer (CMS/HCC) 2010    Right breast   • Cancer (CMS/HCC) 10/2017    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   • Charcot-Saloni-Tooth disease    • CPAP (continuous positive airway pressure) dependence    • GERD (gastroesophageal reflux disease)    • H/O Colon polyps    • H/O Uterine fibroid    • Heart murmur    • Hyperlipidemia    • Hypertension    • PONV (postoperative nausea and vomiting)         Past Surgical  History:   Procedure Laterality Date   • BLADDER SURGERY      Bladder lift   • BREAST RECONSTRUCTION, BREAST TISSUE EXPANDER INSERTION Right 2010   • BREAST SURGERY Right 2010    Mastectomy   •  SECTION  ,    • CHOLECYSTECTOMY     • COLONOSCOPY     • HERNIA REPAIR  2015    [facility]   • HYSTERECTOMY      Partial   • KNEE SURGERY Right    • LEG SURGERY Left     Broken   • MASTECTOMY Right    • MO TREAT TIBIAL SHAFT FX, INTRAMED IMPLANT Left 2017    Procedure: TIBIA INTRAMEDULLARY NAIL/DON INSERTION;  Surgeon: Romero Shanks MD;  Location: [facility];  Service: Orthopedics   • THORACIC DECOMPRESSION POSTERIOR FUSION WITH INSTRUMENTATION N/A 2017    Procedure: T6 costotransversectomy and decompression with T3 to  T9 posterior spinal fusion with instrumentation with Jennifer Gonzalez and Nael Dennis Cellsaver;  Surgeon: Quan Nayak MD;  Location: [facility];  Service:                        PT Assessment/Plan     Row Name 19 1427          PT Assessment    Assessment Comments  Martha continues to require frequent UE support when stabilizing on balance discs however appeared more steady with toe taps. Able to progress resistance for hamstring curls and will consider adding side steps with band next session to further increase functional hip strength  -       User Key  (r) = Recorded By, (t) = Taken By, (c) = Cosigned By    Initials Name Provider Type    Ami Colon, PT Physical Therapist              Exercises     Row Name 19 1400             Subjective Comments    Subjective Comments  I am having an easier time getting up out of a chair now  -         Subjective Pain    Able to rate subjective pain?  yes  -KH      Pre-Treatment Pain Level  1  -KH      Post-Treatment Pain Level  1  -KH         Total Minutes    60979 - PT Therapeutic Exercise Minutes  40  -KH         Exercise 1    Exercise Name 1  see exercise flowsheet  -KH      Cueing 1  Verbal;Tactile   -        User Key  (r) = Recorded By, (t) = Taken By, (c) = Cosigned By    Initials Name Provider Type    Ami Colon, VANNA Physical Therapist              PT OP Goals     Row Name 02/27/19 1400          PT Short Term Goals    STG 1  Patient will report reduction in pain for 12-24 hours post aquatic therapy  -     STG 1 Progress  Progressing  -     STG 2  Patient will perform sit to stand from pool bench without use of hands to increase functional strength  -     STG 2 Progress  Progressing  -     STG 3  Patient will increase L shoulder flexion AROM to 120 degrees or greater to increase ease with overhead tasks  -     STG 3 Progress  Ongoing  -        Long Term Goals    LTG 1  Patient will perform sit to stand from chair on land without use of hands to increase functional strength  -     LTG 1 Progress  Partially Met  -     LTG 1 Progress Comments  able to from higher step height  -     LTG 2  Patient will increase L shoulder flex strength to 4/5 with MMT to increase ease with ADLs  -     LTG 2 Progress  Ongoing  -     LTG 3  Patient will demonstrate independence with advanced aquatic HEP to facilitate independent management of condition  -     LTG 3 Progress  Progressing  -     LTG 4  Patient will improve score on LEFS by 15% or greater to improve quality of life (80% disability at eval)  -     LTG 4 Progress  Progressing  -       User Key  (r) = Recorded By, (t) = Taken By, (c) = Cosigned By    Initials Name Provider Type    Ami Colon, VANNA Physical Therapist          Time Calculation:   Start Time: 1345  Stop Time: 1430  Time Calculation (min): 45 min  Therapy Suggested Charges     Code   Minutes Charges    10652 (CPT®) Hc Pt Neuromusc Re Education Ea 15 Min      44229 (CPT®) Hc Pt Ther Proc Ea 15 Min 40 3    99787 (CPT®) Hc Gait Training Ea 15 Min      75647 (CPT®) Hc Pt Therapeutic Act Ea 15 Min      49643 (CPT®) Hc Pt Manual Therapy Ea 15 Min      85699 (CPT®) Hc Pt Ther  Massage- Per 15 Min      28823 (CPT®) Hc Pt Iontophoresis Ea 15 Min      76118 (CPT®) Hc Pt Elec Stim Ea-Per 15 Min      14977 (CPT®) Hc Pt Ultrasound Ea 15 Min      73810 (CPT®) Hc Pt Self Care/Mgmt/Train Ea 15 Min      35608 (CPT®) Hc Pt Prosthetic (S) Train Initial Encounter, Each 15 Min      86584 (CPT®) Hc Orthotic(S) Mgmt/Train Initial Encounter, Each 15min      01450 (CPT®) Hc Pt Aquatic Therapy Ea 15 Min      53028 (CPT®) Hc Pt Orthotic(S)/Prosthetic(S) Encounter, Each 15 Min       (CPT®) Hc Pt Electrical Stim Unattended      Total  40 3        Therapy Charges for Today     Code Description Service Date Service Provider Modifiers Qty    64542796703 HC PT THER PROC EA 15 MIN 2/27/2019 Ami Graham, PT GP 3                    Ami Graham, PT  2/27/2019

## 2019-03-04 ENCOUNTER — HOSPITAL ENCOUNTER (OUTPATIENT)
Dept: PHYSICAL THERAPY | Facility: HOSPITAL | Age: 59
Setting detail: THERAPIES SERIES
Discharge: HOME | End: 2019-03-04

## 2019-03-04 DIAGNOSIS — R53.1 GENERALIZED WEAKNESS: ICD-10-CM

## 2019-03-04 DIAGNOSIS — Z74.09 IMPAIRED MOBILITY AND ENDURANCE: Primary | ICD-10-CM

## 2019-03-04 PROCEDURE — 97110 THERAPEUTIC EXERCISES: CPT | Performed by: PHYSICAL THERAPIST

## 2019-03-04 NOTE — THERAPY TREATMENT NOTE
Outpatient Physical Therapy Ortho Treatment Note  [facility]     Patient Name: Martha Davey  : 1960  MRN: [id number]  Today's Date: 3/4/2019      Visit Date: 2019    Visit Dx:    ICD-10-CM ICD-9-CM   1. Impaired mobility and endurance Z74.09 V49.89   2. Generalized weakness R53.1 780.79       Patient Active Problem List   Diagnosis   • Malignant neoplasm of overlapping sites of right breast in female, estrogen receptor negative (CMS/HCC)   • Hematuria   • Hyperlipidemia   • Hypertension   • Hereditary sensorimotor neuropathy   • Hyperglycemia   • MARIA M on CPAP   • Gastroesophageal reflux disease   • Colon polyp   • Diverticulitis of colon with perforation   • Foot pain   • Osteoarthritis of knee   • Leukocytosis   • Osteoarthritis of shoulder   • Senile osteopenia   • Long term current use of aromatase inhibitor   • Chronic right-sided thoracic back pain   • Closed fracture of left fibula and tibia   • Closed displaced spiral fracture of shaft of left tibia   • Cancer associated pain   • Cord compression (CMS/HCC)   • Drug induced constipation   • Closed T6 spinal fracture (CMS/HCC)   • History of pulmonary embolism   • S/P ORIF (open reduction internal fixation) fracture   • Bone metastases (CMS/HCC)   • Surgery follow-up examination   • Dysuria   • Hypercalcemia of malignancy   • Pericardial effusion   • Other fatigue        Past Medical History:   Diagnosis Date   • Acute pulmonary embolism, cancer-related 10/2017   • Allergic rhinitis    • Arthritis     Right knee; left shoulder   • Cancer (CMS/HCC) 2010    Right breast   • Cancer (CMS/HCC) 10/2017    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   • Charcot-Saloni-Tooth disease    • CPAP (continuous positive airway pressure) dependence    • GERD (gastroesophageal reflux disease)    • H/O Colon polyps    • H/O Uterine fibroid    • Heart murmur    • Hyperlipidemia    • Hypertension    • PONV (postoperative nausea and vomiting)         Past Surgical  History:   Procedure Laterality Date   • BLADDER SURGERY      Bladder lift   • BREAST RECONSTRUCTION, BREAST TISSUE EXPANDER INSERTION Right 2010   • BREAST SURGERY Right 2010    Mastectomy   •  SECTION  ,    • CHOLECYSTECTOMY     • COLONOSCOPY     • HERNIA REPAIR  2015    [facility]   • HYSTERECTOMY      Partial   • KNEE SURGERY Right    • LEG SURGERY Left     Broken   • MASTECTOMY Right    • CA TREAT TIBIAL SHAFT FX, INTRAMED IMPLANT Left 2017    Procedure: TIBIA INTRAMEDULLARY NAIL/DON INSERTION;  Surgeon: Romero Shanks MD;  Location: [facility];  Service: Orthopedics   • THORACIC DECOMPRESSION POSTERIOR FUSION WITH INSTRUMENTATION N/A 2017    Procedure: T6 costotransversectomy and decompression with T3 to  T9 posterior spinal fusion with instrumentation with Jennifer Gonzalez and Nael Dennis Cellsaver;  Surgeon: Quan Nayak MD;  Location: [facility];  Service:                        PT Assessment/Plan     Row Name 19 1432          PT Assessment    Assessment Comments  Patient continues to report impaired stamina, especially with ambulation. Less instability noted with toe taps but LE movement still somewhat uncoordinated.   -       User Key  (r) = Recorded By, (t) = Taken By, (c) = Cosigned By    Initials Name Provider Type    Ami Colon, PT Physical Therapist              Exercises     Row Name 19 1400             Subjective Comments    Subjective Comments  I feel SOB sometimes when I talk  -         Subjective Pain    Able to rate subjective pain?  yes  -KH      Pre-Treatment Pain Level  1  -KH      Post-Treatment Pain Level  1  -KH         Total Minutes    09061 - PT Therapeutic Exercise Minutes  40  -KH         Exercise 1    Exercise Name 1  see exercise flowsheet  -      Cueing 1  Verbal;Tactile  -        User Key  (r) = Recorded By, (t) = Taken By, (c) = Cosigned By    Initials Name Provider Type    Ami Colon, PT Physical  Therapist                                            Time Calculation:   Start Time: 1345  Stop Time: 1430  Time Calculation (min): 45 min  Therapy Suggested Charges     Code   Minutes Charges    86965 (CPT®) Hc Pt Neuromusc Re Education Ea 15 Min      93492 (CPT®) Hc Pt Ther Proc Ea 15 Min 40 3    22280 (CPT®) Hc Gait Training Ea 15 Min      59880 (CPT®) Hc Pt Therapeutic Act Ea 15 Min      59908 (CPT®) Hc Pt Manual Therapy Ea 15 Min      36626 (CPT®) Hc Pt Ther Massage- Per 15 Min      27009 (CPT®) Hc Pt Iontophoresis Ea 15 Min      05360 (CPT®) Hc Pt Elec Stim Ea-Per 15 Min      95490 (CPT®) Hc Pt Ultrasound Ea 15 Min      99954 (CPT®) Hc Pt Self Care/Mgmt/Train Ea 15 Min      89683 (CPT®) Hc Pt Prosthetic (S) Train Initial Encounter, Each 15 Min      41405 (CPT®) Hc Orthotic(S) Mgmt/Train Initial Encounter, Each 15min      81290 (CPT®) Hc Pt Aquatic Therapy Ea 15 Min      96258 (CPT®) Hc Pt Orthotic(S)/Prosthetic(S) Encounter, Each 15 Min       (CPT®) Hc Pt Electrical Stim Unattended      Total  40 3        Therapy Charges for Today     Code Description Service Date Service Provider Modifiers Qty    35491999631 HC PT THER PROC EA 15 MIN 3/4/2019 Ami Graham, PT GP 3                    Ami Graham, PT  3/4/2019

## 2019-03-06 ENCOUNTER — HOSPITAL ENCOUNTER (OUTPATIENT)
Dept: PHYSICAL THERAPY | Facility: HOSPITAL | Age: 59
Setting detail: THERAPIES SERIES
Discharge: HOME | End: 2019-03-06

## 2019-03-06 DIAGNOSIS — Z74.09 IMPAIRED MOBILITY AND ENDURANCE: Primary | ICD-10-CM

## 2019-03-06 DIAGNOSIS — R53.1 GENERALIZED WEAKNESS: ICD-10-CM

## 2019-03-06 PROCEDURE — 97113 AQUATIC THERAPY/EXERCISES: CPT | Performed by: PHYSICAL THERAPIST

## 2019-03-06 NOTE — THERAPY TREATMENT NOTE
Outpatient Physical Therapy Ortho Treatment Note  [facility]     Patient Name: Martha Davey  : 1960  MRN: [id number]  Today's Date: 3/6/2019      Visit Date: 2019    Visit Dx:    ICD-10-CM ICD-9-CM   1. Impaired mobility and endurance Z74.09 V49.89   2. Generalized weakness R53.1 780.79       Patient Active Problem List   Diagnosis   • Malignant neoplasm of overlapping sites of right breast in female, estrogen receptor negative (CMS/HCC)   • Hematuria   • Hyperlipidemia   • Hypertension   • Hereditary sensorimotor neuropathy   • Hyperglycemia   • MARIA M on CPAP   • Gastroesophageal reflux disease   • Colon polyp   • Diverticulitis of colon with perforation   • Foot pain   • Osteoarthritis of knee   • Leukocytosis   • Osteoarthritis of shoulder   • Senile osteopenia   • Long term current use of aromatase inhibitor   • Chronic right-sided thoracic back pain   • Closed fracture of left fibula and tibia   • Closed displaced spiral fracture of shaft of left tibia   • Cancer associated pain   • Cord compression (CMS/HCC)   • Drug induced constipation   • Closed T6 spinal fracture (CMS/HCC)   • History of pulmonary embolism   • S/P ORIF (open reduction internal fixation) fracture   • Bone metastases (CMS/HCC)   • Surgery follow-up examination   • Dysuria   • Hypercalcemia of malignancy   • Pericardial effusion   • Other fatigue        Past Medical History:   Diagnosis Date   • Acute pulmonary embolism, cancer-related 10/2017   • Allergic rhinitis    • Arthritis     Right knee; left shoulder   • Cancer (CMS/HCC) 2010    Right breast   • Cancer (CMS/HCC) 10/2017    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   • Charcot-Saloni-Tooth disease    • CPAP (continuous positive airway pressure) dependence    • GERD (gastroesophageal reflux disease)    • H/O Colon polyps    • H/O Uterine fibroid    • Heart murmur    • Hyperlipidemia    • Hypertension    • PONV (postoperative nausea and vomiting)         Past Surgical  History:   Procedure Laterality Date   • BLADDER SURGERY      Bladder lift   • BREAST RECONSTRUCTION, BREAST TISSUE EXPANDER INSERTION Right 2010   • BREAST SURGERY Right 2010    Mastectomy   •  SECTION  ,    • CHOLECYSTECTOMY     • COLONOSCOPY     • HERNIA REPAIR  2015    [facility]   • HYSTERECTOMY      Partial   • KNEE SURGERY Right    • LEG SURGERY Left     Broken   • MASTECTOMY Right    • MO TREAT TIBIAL SHAFT FX, INTRAMED IMPLANT Left 2017    Procedure: TIBIA INTRAMEDULLARY NAIL/DON INSERTION;  Surgeon: Romero Shanks MD;  Location: [facility];  Service: Orthopedics   • THORACIC DECOMPRESSION POSTERIOR FUSION WITH INSTRUMENTATION N/A 2017    Procedure: T6 costotransversectomy and decompression with T3 to  T9 posterior spinal fusion with instrumentation with Jennifer Gonzalez and Nael Dennis Cellsaver;  Surgeon: Quan Nayak MD;  Location: [facility];  Service:                        PT Assessment/Plan     Row Name 19 1337          PT Assessment    Assessment Comments  Returns to pool environment after a few sessions on land. Continued with core, BLE strengthening program, balance and gait work. She is reporting improved stability and strength at home as noted by ability to negotiate short distances at home without walking sticks.  -CK       User Key  (r) = Recorded By, (t) = Taken By, (c) = Cosigned By    Initials Name Provider Type    CK You Bunn, PT Physical Therapist              Exercises     Row Name 19 0900             Subjective Comments    Subjective Comments  I do feel stronger. I am not using the walking sticks in the house.   -CK         Subjective Pain    Able to rate subjective pain?  yes  -CK      Pre-Treatment Pain Level  1  -CK      Post-Treatment Pain Level  1  -CK         Aquatics    11505 - Aquatic Therapy Minutes  45  -CK         Aquatics LE    Water Walk  forward;backward;side x 3 laps; intermittent use of LN for balance  -CK       Stretch 2  kickboard push/pull x 15 * gluteal squeeze for stability, Wide PERRY  -CK      Stretch 3  shldr abd/abd, shallow x 20 blue bouys  -CK      Stretch Other 2  Sit to stands X 10 * gluteal squeeze  -CK      Abdominals  noodle;Other (comment) LN x 20  -CK      Clams  suspended x2 min  -CK      Hip Abd/Add  B x15, 1.5#  -CK      March in Place  MIP, No UE support x 1min  -CK      Mini Squat  15X *gluteal squeeze at top 3 ft depth  -CK      Toe/Heel Raises  15  -CK      Uni-Squat  B hip circles cw/ccw 10/10, 1.5#  -CK      Uni-Clock  LAQ, B x 10, 10” hold 1.5#  -CK      Step Ups  fwd, 8’ B x 15  -CK      Bicycle  2 min seated, 1.5#  -CK      Flutter/Scissor  HS curls, B x 20, 1.5#  -CK        User Key  (r) = Recorded By, (t) = Taken By, (c) = Cosigned By    Initials Name Provider Type    CK You Bunn S, PT Physical Therapist                                            Time Calculation:   Start Time: 0900  Stop Time: 0945  Time Calculation (min): 45 min  Total Timed Code Minutes- PT: 45 minute(s)  Therapy Suggested Charges     Code   Minutes Charges    82399 (CPT®) Hc Pt Neuromusc Re Education Ea 15 Min      97573 (CPT®) Hc Pt Ther Proc Ea 15 Min      87695 (CPT®) Hc Gait Training Ea 15 Min      37060 (CPT®) Hc Pt Therapeutic Act Ea 15 Min      13274 (CPT®) Hc Pt Manual Therapy Ea 15 Min      44184 (CPT®) Hc Pt Ther Massage- Per 15 Min      36443 (CPT®) Hc Pt Iontophoresis Ea 15 Min      15303 (CPT®) Hc Pt Elec Stim Ea-Per 15 Min      55970 (CPT®) Hc Pt Ultrasound Ea 15 Min      94048 (CPT®) Hc Pt Self Care/Mgmt/Train Ea 15 Min      53243 (CPT®) Hc Pt Prosthetic (S) Train Initial Encounter, Each 15 Min      63883 (CPT®) Hc Orthotic(S) Mgmt/Train Initial Encounter, Each 15min      92512 (CPT®) Hc Pt Aquatic Therapy Ea 15 Min 45 3    67752 (CPT®) Hc Pt Orthotic(S)/Prosthetic(S) Encounter, Each 15 Min       (CPT®) Hc Pt Electrical Stim Unattended      Total  45 3        Therapy Charges for Today     Code  Description Service Date Service Provider Modifiers Qty    05208248842 HC PT AQUATIC THERAPY EA 15 MIN 3/6/2019 You Bunn, PT GP 3                    You HELTON. Oni, PT  3/6/2019

## 2019-03-11 ENCOUNTER — APPOINTMENT (OUTPATIENT)
Dept: PHYSICAL THERAPY | Facility: HOSPITAL | Age: 59
End: 2019-03-11

## 2019-03-21 RX ORDER — CYCLOBENZAPRINE HCL 10 MG
TABLET ORAL
Qty: 30 TABLET | Refills: 0 | Status: SHIPPED | OUTPATIENT
Start: 2019-03-21 | End: 2019-04-19 | Stop reason: SDUPTHER

## 2019-03-26 ENCOUNTER — OFFICE VISIT (OUTPATIENT)
Dept: ONCOLOGY | Facility: CLINIC | Age: 59
End: 2019-03-26

## 2019-03-26 ENCOUNTER — LAB (OUTPATIENT)
Dept: LAB | Facility: HOSPITAL | Age: 59
End: 2019-03-26

## 2019-03-26 ENCOUNTER — INFUSION (OUTPATIENT)
Dept: ONCOLOGY | Facility: HOSPITAL | Age: 59
End: 2019-03-26

## 2019-03-26 VITALS
RESPIRATION RATE: 14 BRPM | HEIGHT: 61 IN | SYSTOLIC BLOOD PRESSURE: 126 MMHG | OXYGEN SATURATION: 97 % | HEART RATE: 91 BPM | TEMPERATURE: 98.7 F | DIASTOLIC BLOOD PRESSURE: 84 MMHG | BODY MASS INDEX: 37.42 KG/M2 | WEIGHT: 198.2 LBS

## 2019-03-26 DIAGNOSIS — Z17.1 MALIGNANT NEOPLASM OF OVERLAPPING SITES OF RIGHT BREAST IN FEMALE, ESTROGEN RECEPTOR NEGATIVE (HCC): ICD-10-CM

## 2019-03-26 DIAGNOSIS — C50.811 MALIGNANT NEOPLASM OF OVERLAPPING SITES OF RIGHT BREAST IN FEMALE, ESTROGEN RECEPTOR NEGATIVE (HCC): ICD-10-CM

## 2019-03-26 DIAGNOSIS — C50.811 MALIGNANT NEOPLASM OF OVERLAPPING SITES OF RIGHT BREAST IN FEMALE, ESTROGEN RECEPTOR NEGATIVE (HCC): Primary | ICD-10-CM

## 2019-03-26 DIAGNOSIS — C79.51 BONE METASTASES: Primary | ICD-10-CM

## 2019-03-26 DIAGNOSIS — Z17.1 MALIGNANT NEOPLASM OF OVERLAPPING SITES OF RIGHT BREAST IN FEMALE, ESTROGEN RECEPTOR NEGATIVE (HCC): Primary | ICD-10-CM

## 2019-03-26 LAB
ALBUMIN SERPL-MCNC: 4.1 G/DL (ref 3.5–5.2)
ALBUMIN/GLOB SERPL: 1.7 G/DL (ref 1.1–2.4)
ALP SERPL-CCNC: 68 U/L (ref 38–116)
ALT SERPL W P-5'-P-CCNC: 33 U/L (ref 0–33)
ANION GAP SERPL CALCULATED.3IONS-SCNC: 10.7 MMOL/L
AST SERPL-CCNC: 23 U/L (ref 0–32)
BASOPHILS # BLD AUTO: 0.04 10*3/MM3 (ref 0–0.2)
BASOPHILS NFR BLD AUTO: 1 % (ref 0–1.5)
BILIRUB SERPL-MCNC: 0.3 MG/DL (ref 0.2–1.2)
BUN BLD-MCNC: 20 MG/DL (ref 6–20)
BUN/CREAT SERPL: 21.3 (ref 7.3–30)
CALCIUM SPEC-SCNC: 9.1 MG/DL (ref 8.5–10.2)
CHLORIDE SERPL-SCNC: 102 MMOL/L (ref 98–107)
CO2 SERPL-SCNC: 28.3 MMOL/L (ref 22–29)
CREAT BLD-MCNC: 0.94 MG/DL (ref 0.6–1.1)
DEPRECATED RDW RBC AUTO: 66.3 FL (ref 37–54)
EOSINOPHIL # BLD AUTO: 0.16 10*3/MM3 (ref 0–0.4)
EOSINOPHIL NFR BLD AUTO: 4.2 % (ref 0.3–6.2)
ERYTHROCYTE [DISTWIDTH] IN BLOOD BY AUTOMATED COUNT: 17.5 % (ref 12.3–15.4)
GFR SERPL CREATININE-BSD FRML MDRD: 61 ML/MIN/1.73
GLOBULIN UR ELPH-MCNC: 2.4 GM/DL (ref 1.8–3.5)
GLUCOSE BLD-MCNC: 99 MG/DL (ref 74–124)
HCT VFR BLD AUTO: 37.6 % (ref 34–46.6)
HGB BLD-MCNC: 12.6 G/DL (ref 12–15.9)
IMM GRANULOCYTES # BLD AUTO: 0.02 10*3/MM3 (ref 0–0.05)
IMM GRANULOCYTES NFR BLD AUTO: 0.5 % (ref 0–0.5)
LYMPHOCYTES # BLD AUTO: 0.91 10*3/MM3 (ref 0.7–3.1)
LYMPHOCYTES NFR BLD AUTO: 23.7 % (ref 19.6–45.3)
MAGNESIUM SERPL-MCNC: 2 MG/DL (ref 1.8–2.5)
MCH RBC QN AUTO: 34.3 PG (ref 26.6–33)
MCHC RBC AUTO-ENTMCNC: 33.5 G/DL (ref 31.5–35.7)
MCV RBC AUTO: 102.5 FL (ref 79–97)
MONOCYTES # BLD AUTO: 0.64 10*3/MM3 (ref 0.1–0.9)
MONOCYTES NFR BLD AUTO: 16.7 % (ref 5–12)
NEUTROPHILS # BLD AUTO: 2.07 10*3/MM3 (ref 1.4–7)
NEUTROPHILS NFR BLD AUTO: 53.9 % (ref 42.7–76)
NRBC BLD AUTO-RTO: 0 /100 WBC (ref 0–0)
PHOSPHATE SERPL-MCNC: 4 MG/DL (ref 2.5–4.5)
PLATELET # BLD AUTO: 213 10*3/MM3 (ref 140–450)
PMV BLD AUTO: 10.1 FL (ref 6–12)
POTASSIUM BLD-SCNC: 4.8 MMOL/L (ref 3.5–4.7)
PROT SERPL-MCNC: 6.5 G/DL (ref 6.3–8)
RBC # BLD AUTO: 3.67 10*6/MM3 (ref 3.77–5.28)
SODIUM BLD-SCNC: 141 MMOL/L (ref 134–145)
WBC NRBC COR # BLD: 3.84 10*3/MM3 (ref 3.4–10.8)

## 2019-03-26 PROCEDURE — 85025 COMPLETE CBC W/AUTO DIFF WBC: CPT

## 2019-03-26 PROCEDURE — 36415 COLL VENOUS BLD VENIPUNCTURE: CPT | Performed by: INTERNAL MEDICINE

## 2019-03-26 PROCEDURE — 99214 OFFICE O/P EST MOD 30 MIN: CPT | Performed by: NURSE PRACTITIONER

## 2019-03-26 PROCEDURE — 96372 THER/PROPH/DIAG INJ SC/IM: CPT | Performed by: NURSE PRACTITIONER

## 2019-03-26 PROCEDURE — 80053 COMPREHEN METABOLIC PANEL: CPT | Performed by: INTERNAL MEDICINE

## 2019-03-26 PROCEDURE — 83735 ASSAY OF MAGNESIUM: CPT | Performed by: INTERNAL MEDICINE

## 2019-03-26 PROCEDURE — 25010000002 DENOSUMAB 120 MG/1.7ML SOLUTION: Performed by: NURSE PRACTITIONER

## 2019-03-26 PROCEDURE — 84100 ASSAY OF PHOSPHORUS: CPT | Performed by: INTERNAL MEDICINE

## 2019-03-26 RX ADMIN — DENOSUMAB 120 MG: 120 INJECTION SUBCUTANEOUS at 12:14

## 2019-03-26 NOTE — PROGRESS NOTES
Subjective .     REASONS FOR FOLLOWUP:    1. Stage Ib ([id number]) right breast cancer: Diagnosed May 2010 with excisional biopsy for invasive ductal carcinoma, 1.3 cm, grade 2, ER 90%, CT 80%, HER-2/peter negative (1+ IHC).  Subsequent right mastectomy in July 2010 with no residual breast malignancy, 1/5 sentinel lymph node with micrometastasis (0.25 mm).  Treated in the [facility] system with adjuvant AC ×4 cycles in 2010 (no taxanes administered due to underlying Charcot-Saloni-Tooth with peripheral neuropathy).  Adjuvant Femara (postmenopausal) initiated October 2010 with plan to treat ×10 years.  Genetic testing reportedly negative.  Developed osteopenia treated with Prolia beginning 2/27/13.  2. Recurrent/metastatic disease identified 10/8/17 involving thoracic spine with cord compression at T6, lumbosacral involvement, sternal and right sternoclavicular involvement.  Femara discontinued.  Radiation administered (in the [facility] system) to the thoracic spine beginning 10/19/17 treating T3-T9 to a dose of 24 gray in 6 fractions.  3. Evaluation with MRI 12/8/17 showing persistent T6 cord compression with persistent neurologic compromise requiring surgical treatment 12/11/17 with T6 laminectomy/corpectomy and T3-T9 fusion.  Pathology with metastatic carcinoma of breast origin, ER negative, CT negative, HER-2/peter negative (1+ IHC).  4. Right pulmonary embolism 10/21/17 treated with Lovenox 1 mg/kg (100 mg) every 12 hours.  No evidence of DVT.  Lovenox held due to right gluteus hematoma 3/23/18 through 4/6/18.  Transitioned to oral Eliquis 5mg bid 1/28/19 (as of 2/25/19, patient still using previous supply of Lovenox).  5. Cancer related pain previously on Duragesic 50 µg patch every 72 hours and Dilaudid 4 mg as needed for breakthrough pain.  Narcotics subsequently discontinued with improvement in pain in January 2018.  6. Radiation therapy to L3 dural metastasis and left humerus initiated 1/15/18 (10 fractions),  completed 1/26/18  7. Monthly Xgeva initiated 1/23/18  8. Mild hypercalcemia of malignancy  9. Initiation of palliative oral single agent Xeloda 2/7/18 (2000 mg a.m., 1500 mg p.m. for 14/21 days).  10. 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  In reviewing prior CT scans, has been present for some time.  Monitor for now, if it enlarges consider surgical excision.  11. Cumulative side effects from Xeloda with fatigue, anorexia, diarrhea, increased tearing.  Alteration in dose/schedule with cycle 11 to 1500 mg twice a day for 7 days on followed by 7 days off.  12. Grade 2 hand-foot syndrome with an additional 1 week break from Xeloda and plans to restart at 1500 mg in the a.m. and 1000 mg in the p.m. for 7 days on, followed by 7 days off.    HISTORY OF PRESENT ILLNESS:  The patient is a 58 y.o. year old female who is here for follow-up with the above-mentioned history.    History of Present Illness the patient returns today in follow-up due to begin cycle 18 Xeloda and also continuing on monthly Xgeva that is due today.  The patient continues to tolerate treatment well though has had some worsening of her hand-foot syndrome symptoms.  She has redness and cracks on her hands and almost every joint line.  She has some fissuring on her fingertips.  She has been using creams to her hands multiple times daily but has not used the white gloves as we discussed at the previous visit.  She continues with pain in her left foot and is following up with a podiatrist soon due to a fracture around the left fifth metatarsal.  She continues to ambulate with use of stabilizing poles.    She denies mouth sores, bowel changes, or nausea.  Her appetite is stable.  She has transitioned to Eliquis without any bleeding concerns.  She denies fevers or new  pain.      Past Medical History:   Diagnosis Date   • Acute pulmonary embolism, cancer-related 10/2017   • Allergic rhinitis    • Arthritis     Right knee; left shoulder   • Cancer (CMS/[facility]) 2010    Right breast   • Cancer (CMS/[facility]) 10/2017    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   • Charcot-Saloni-Tooth disease    • CPAP (continuous positive airway pressure) dependence    • GERD (gastroesophageal reflux disease)    • H/O Colon polyps    • H/O Uterine fibroid    • Heart murmur    • Hyperlipidemia    • Hypertension    • PONV (postoperative nausea and vomiting)      Past Surgical History:   Procedure Laterality Date   • BLADDER SURGERY      Bladder lift   • BREAST RECONSTRUCTION, BREAST TISSUE EXPANDER INSERTION Right    • BREAST SURGERY Right 2010    Mastectomy   •  SECTION  ,    • CHOLECYSTECTOMY     • COLONOSCOPY     • HERNIA REPAIR  2015    [facility]   • HYSTERECTOMY      Partial   • KNEE SURGERY Right    • LEG SURGERY Left     Broken   • MASTECTOMY Right    • PA TREAT TIBIAL SHAFT FX, INTRAMED IMPLANT Left 2017    Procedure: TIBIA INTRAMEDULLARY NAIL/DON INSERTION;  Surgeon: Romero Shanks MD;  Location: [facility];  Service: Orthopedics   • THORACIC DECOMPRESSION POSTERIOR FUSION WITH INSTRUMENTATION N/A 2017    Procedure: T6 costotransversectomy and decompression with T3 to  T9 posterior spinal fusion with instrumentation with Jennifer Gonzalez and N[facility];  Surgeon: Quan Nayak MD;  Location: [facility];  Service:        ONCOLOGIC HISTORY:  (History from previous dates can be found in the separate document.)    Current Outpatient Medications on File Prior to Visit   Medication Sig Dispense Refill   • acetaminophen (TYLENOL) 500 MG tablet Take 500 mg by mouth Every 6 (Six) Hours As Needed for Mild Pain .     • apixaban (ELIQUIS) 5 MG tablet tablet Take 1 tablet by mouth Every 12 (Twelve) Hours. 60 tablet 5   • calcium carbonate (OS-CARY) 600 MG  tablet Take 600 mg by mouth 2 (Two) Times a Day With Meals.     • capecitabine (XELODA) 500 MG chemo tablet Take 3 tabs (1500 mg) po twice a day for 7 days on then 7 days off. 84 tablet 3   • cholecalciferol (VITAMIN D3) 1000 units tablet Take 1,000 Units by mouth Daily.     • cyclobenzaprine (FLEXERIL) 10 MG tablet TAKE 1 TABLET BY MOUTH THREE TIMES DAILY AS NEEDED FOR MUSCLE SPASMS 30 tablet 0   • diazePAM (VALIUM) 10 MG tablet Take 1 tablet by mouth Every 12 (Twelve) Hours As Needed for Anxiety. 10 tablet 1   • diphenoxylate-atropine (LOMOTIL) 2.5-0.025 MG per tablet Take 1 tablet by mouth 4 (Four) Times a Day As Needed for Diarrhea. 60 tablet 2   • FLONASE ALLERGY RELIEF 50 MCG/ACT nasal spray 2 sprays into each nostril daily.  11   • gabapentin (NEURONTIN) 300 MG capsule TAKE 1 CAPSULE BY MOUTH THREE TIMES DAILY 90 capsule 0   • HYDROmorphone (DILAUDID) 4 MG tablet Take 1 tablet by mouth Every 4 (Four) Hours As Needed for Moderate Pain . 60 tablet 0   • mesalamine (LIALDA) 1.2 g EC tablet TAKE 1 TABLET BY MOUTH TWICE DAILY 180 tablet 0   • omeprazole (PriLOSEC) 40 MG capsule TAKE 1 CAPSULE DAILY 90 capsule 2   • ondansetron ODT (ZOFRAN-ODT) 8 MG disintegrating tablet Take 1 tablet by mouth Every 8 (Eight) Hours As Needed for Nausea or Vomiting. 30 tablet 1   • phenazopyridine (PYRIDIUM) 200 MG tablet Take 200 mg by mouth 3 (Three) Times a Day As Needed for bladder spasms.     • prochlorperazine (COMPAZINE) 10 MG tablet Take 1 tablet by mouth Every 6 (Six) Hours As Needed for Nausea or Vomiting. 30 tablet 0   • sennosides-docusate sodium (SENOKOT-S) 8.6-50 MG tablet Take 2 tablets by mouth 2 (Two) Times a Day. 90 tablet 2   • traMADol (ULTRAM) 50 MG tablet Take 1 tablet by mouth Every 8 (Eight) Hours As Needed for Moderate Pain . 90 tablet 2   • trimethoprim (TRIMPEX) 100 MG tablet Take 100 mg by mouth Daily.  2   • nitrofurantoin (MACRODANTIN) 100 MG capsule Take 1 capsule by mouth 2 (Two) Times a Day. 20  capsule 0   • nitrofurantoin, macrocrystal-monohydrate, (MACROBID) 100 MG capsule Take 1 capsule by mouth 2 (Two) Times a Day. 10 capsule 0   • Tuberculin PPD (APLISOL ID) Inject  into the skin.     • Unable to find SWISH AND SPIT 5 ML PO Q 2 H PRN  0     No current facility-administered medications on file prior to visit.        ALLERGIES:     Allergies   Allergen Reactions   • Hydrocodone Nausea Only   • Morphine And Related Nausea And Vomiting     Social History     Socioeconomic History   • Marital status:      Spouse name: Murray   • Number of children: 3   • Years of education: College   • Highest education level: Not on file   Occupational History     Employer: [employer]     Employer: RETIRED   Tobacco Use   • Smoking status: Never Smoker   • Smokeless tobacco: Never Used   Substance and Sexual Activity   • Alcohol use: Yes     Comment: social   • Drug use: No   • Sexual activity: Defer     Family History   Problem Relation Age of Onset   • Heart disease Mother    • Hyperlipidemia Mother    • Hypertension Mother    • Diabetes Father    • Charcot-Saloni-Tooth disease Father    • Heart disease Father    • Hypertension Father    • Heart failure Father    • Diabetes Sister    • Heart disease Sister    • Hypertension Sister    • Heart disease Maternal Aunt    • Scoliosis Maternal Aunt    • Heart disease Maternal Uncle    • Diabetes Maternal Grandmother    • Heart disease Maternal Grandmother    • Hypertension Maternal Grandmother    • Uterine cancer Maternal Grandmother    • Heart disease Maternal Grandfather      Review of Systems   Constitutional: Positive for fatigue. Negative for activity change, appetite change, fever and unexpected weight change.   HENT: Negative for congestion, mouth sores, nosebleeds, sore throat and voice change.    Eyes: Negative for discharge.   Respiratory: Negative for cough, shortness of breath and wheezing.    Cardiovascular: Negative for chest pain, palpitations and leg  swelling.   Gastrointestinal: Negative for abdominal distention, abdominal pain, blood in stool, constipation, diarrhea, nausea and vomiting.   Endocrine: Negative for cold intolerance and heat intolerance.   Genitourinary: Negative for difficulty urinating, dysuria, frequency and hematuria.   Musculoskeletal: Positive for arthralgias. Negative for back pain, joint swelling and myalgias.   Skin: Positive for color change and rash. Negative for wound.   Neurological: Negative for dizziness, syncope, weakness, light-headedness, numbness and headaches.   Hematological: Negative for adenopathy. Does not bruise/bleed easily.   Psychiatric/Behavioral: Negative for confusion. The patient is not nervous/anxious.          Objective      Vitals:    03/26/19 1133   BP: 126/84   Pulse: 91   Resp: 14   Temp: 98.7 °F (37.1 °C)   SpO2: 97%      Current Status 3/26/2019   ECOG score 1   Pain 0/10    Physical Exam   Constitutional: She is oriented to person, place, and time. She appears well-developed and well-nourished.   The patient is ambulating with the use of stabilizing poles   HENT:   Mouth/Throat: Oropharynx is clear and moist.   Eyes: Conjunctivae are normal.   Neck: No thyromegaly present.   Cardiovascular: Normal rate and regular rhythm. Exam reveals no gallop and no friction rub.   No murmur heard.  Pulmonary/Chest: Breath sounds normal. No respiratory distress.   Abdominal: Soft. Bowel sounds are normal. She exhibits no distension. There is no tenderness.   Musculoskeletal: She exhibits edema.   Lower extremity braces in place.  Trace bilateral lower extremity edema.   Lymphadenopathy:     She has no cervical adenopathy.        Right: No supraclavicular adenopathy present.        Left: No supraclavicular adenopathy present.   Neurological: She is alert and oriented to person, place, and time. No cranial nerve deficit.   Skin: Skin is warm and dry. No rash noted.   Erythema involving the palms of the hands and fingers.   Fissures noted on multiple fingertips with cracking noted at joint lines of fingers and palms.  Minor peeling noted.   Psychiatric: She has a normal mood and affect. Her behavior is normal.       RECENT LABS:  Hematology WBC   Date Value Ref Range Status   03/26/2019 3.84 3.40 - 10.80 10*3/mm3 Final     RBC   Date Value Ref Range Status   03/26/2019 3.67 (L) 3.77 - 5.28 10*6/mm3 Final     Hemoglobin   Date Value Ref Range Status   03/26/2019 12.6 12.0 - 15.9 g/dL Final     Hematocrit   Date Value Ref Range Status   03/26/2019 37.6 34.0 - 46.6 % Final     Platelets   Date Value Ref Range Status   03/26/2019 213 140 - 450 10*3/mm3 Final        Lab Results   Component Value Date    GLUCOSE 99 03/26/2019    BUN 20 03/26/2019    CREATININE 0.94 03/26/2019    EGFRIFNONA 61 03/26/2019    EGFRIFAFRI 80 09/25/2017    BCR 21.3 03/26/2019    K 4.8 (H) 03/26/2019    CO2 28.3 03/26/2019    CALCIUM 9.1 03/26/2019    PROTENTOTREF 6.4 09/25/2017    ALBUMIN 4.10 03/26/2019    LABIL2 2.2 09/25/2017    AST 23 03/26/2019    ALT 33 03/26/2019       Assessment/Plan      1. Previous Stage Ib ([id number]) right breast cancer:  · Diagnosed May 2010 with excisional biopsy for invasive ductal carcinoma, 1.3 cm, grade 2, ER 90%, IN 80%, HER-2/peter negative (1+ IHC).    · Subsequent right mastectomy in July 2010 with no residual breast malignancy, 1/5 sentinel lymph node with micrometastasis (0.25 mm).    · Treated in the [facility] system with adjuvant AC ×4 cycles in 2010 (no taxanes administered due to underlying Charcot-Saloni-Tooth with peripheral neuropathy).    · Adjuvant Femara (postmenopausal) initiated October 2010 with plan to treat ×10 years.    · Genetic testing reportedly negative.    · Developed osteopenia treated with Prolia beginning 2/27/13. Subsequently discontinued due to identification of metastatic disease.  2. Recurrent/metastatic disease identified 10/8/17:  · Disease involving thoracic spine with cord compression at T6,  lumbosacral involvement, sternal and right sternoclavicular involvement.    · Femara discontinued in 10/2017.    · Radiation administered (in the [facility] system) to the thoracic spine beginning 10/19/17 treating T3-T9 to a dose of 24 gray in 6 fractions.  · Evaluation with MRI 12/8/17 showing persistent T6 cord compression with persistent neurologic compromise requiring surgical treatment 12/11/17 with T6 laminectomy/corpectomy and T3-T9 fusion.  Pathology with metastatic carcinoma of breast origin, ER negative, ME negative, HER-2/peter negative (1+ IHC).  · Additional staging evaluation 12/8/17 with no evidence of visceral metastatic disease, bone scan showing involvement of thoracic spine, sternum, left humerus, mid frontal bone.  No plane film correlate of left humerus lesion.  MRI lumbar spine with small intradural L3 metastasis.  CA 15-3 12/6/17- 17.  · Palliative radiation therapy to L3 dural metastasis and left humerus initiated 1/15/18 (10 fractions), completed 1/26/18.  · Hypercalcemia of malignancy with calcium in the 10-11 range.  · Initiation of monthly Xgeva 1/23/18.  · Baseline CT scan 1/30/18 with no evidence of visceral involvement.  Cluster of nodular opacities in the right lower lobe suspected to be infectious or related to bronchiolitis. Bone scan 1/30/18 showed postsurgical change in the thoracic spine, stable uptake in the frontal bone, no new areas of disease.  · Initiation of palliative oral single agent Xeloda 2/7/18 2000 mg a.m., 1500 mg p.m. for 14/21 days.   · Following 3 cycles xeloda, bone scan 4/4/18 showed no change from the prior study.  CT scan 4/4/18 showed a small pericardial effusion of unclear significance as well as a subcutaneous nodule in the right lateral chest wall.  Subsequent evaluation with echocardiogram 4/17/18 showed no evidence of pericardial effusion.  Ultrasound-guided biopsy of the right subcutaneous chest wall abnormality on 4/16/18 revealed a low-grade spindle cell  neoplasm with negative breast marker, possibly a nerve sheath tumor.  We discussed the possibility of surgical excision of the right subcutaneous chest wall lesion for more definitive diagnosis.  Reviewed previous CT images dating back to 12/8/17 and the lesion was present even at that time measuring around 1.7 cm although not commented on in the radiology report.  As this appears to be an indolent low-grade process unrelated to her breast cancer, recommendee foregoing surgical excision at this time and monitoring this area on future scans.  The patient agreed.    · Following 6 cycles of Xeloda, CT 6/6/18  showed stable findings, no evidence of progressive disease.  There was a comment regarding subcutaneous abnormality in the anterior abdominal wall and this was related to Lovenox injection sites.  Bone scan 6/6/18 showed no interval change.   · CT scan and bone scan 8/13/18 following 9 cycles of Xeloda showed stable findings with no evidence of significant visceral metastases.  Her bone lesions appear stable on bone scan.  The spindle cell neoplasm in her right chest wall actually decreased in size from 2 cm down to 1.6 cm.    · The patient experienced some symptoms of diarrhea, anorexia, generalized weakness during cycle 9 Xeloda it was unclear whether this was related to a viral gastroenteritis or toxicity from treatment.  Symptoms recurred during cycle 10 and treatment was cut short by 2 days.  Symptoms attributed to Xeloda.  With cycle 11, dose and schedule altered to 1500 mg twice daily for 7 days on followed by 7 days off .  · CT scan and bone scan from 10/31/18 following 12 cycles of Xeloda showed stable disease.    · CT scans and bone scan 1/21/19 following 15 cycles of Xeloda showed stable findings.  We discussed pursuing repeat CT scan and bone scan after 3 cycles (end of cycle 18).  · The patient returns today in follow-up due for cycle 18 of Xeloda along with monthly Xgeva.  She continues to tolerate  treatment reasonably well, does have grade 2 hand-foot symptoms.  This will be discussed further below.  This was reviewed with Dr. Hancock today we will reduce her dose of Xeloda to 1500 mg in the morning and 1000 mg in the evening 7 days on followed by 7 days off.  She will also take a week break from Xeloda which she was supposed to start tomorrow.  This will hopefully give her hands and feet time to recover prior to starting the reduced dose.  We will proceed with CT scans of the chest, abdomen, pelvis and bone scan in 3 weeks.  3. Right hip/gluteal hematoma:  · The patient had developed considerable pain in the right hip and underwent MRI 3/22/18 visit showed a 5.7 x 4.3 x 11 cm hematoma in the right gluteal region.  · Lovenox was held 3/23 through 4/6/18.  Resolution of pain, Lovenox was resumed. Hematoma likely occurred due to minimal trauma from transfer out of her wheelchair.   4. Right pulmonary embolism:  · Diagnosed on CT angiogram 10/21/17 involving small right lower lobe pulmonary artery.  Lower extremity Dopplers negative.  · Bilateral lower extremity Dopplers negative again 12/5/17.  · Continuing on Lovenox 1 mg/kg (80 mg) subcutaneous injection every 12 hours.  The patient has not experienced any recent bleeding issues.  Discussed transition to Eliquis 5 mg twice daily on 1/28/19 and prescription was provided.  The patient however is continuing to use up her previous supply of Lovenox and has not yet started Eliquis.  When she runs out of her Lovenox, she will transition as planned.   · Patient transition to Eliquis from Lovenox mid March without any bleeding difficulties.  5. Cancer related pain:  · Previously receiving Duragesic 50 µg patch every 72 hours along with Dilaudid 4 mg as needed for breakthrough pain  · The patient's pain improved over time and she was able to discontinue both Duragesic and Dilaudid in the interval.  · Patient does take occasional Flexeril at bedtime due to back  spasm/pain when she has been more active.  · Right hip pain as noted above due to right gluteal hematoma, previously prescribed Dilaudid 4 mg every 4 hours as needed #60 with no refill.  Pain from hematoma resolved.  · The patient does have some occasional aggravation of her chronic back pain with significant rehabilitation activity and requires an occasional dose of Dilaudid.    6. Chemotherapy-induced diarrhea with subsequent C. difficile colitis:  · The patient developed initial diarrhea related to Xeloda at regular dosing.  · Symptoms improved on reduced dose Xeloda  · Flare of symptoms in October 2018 with apparent finding of C. difficile colitis by GI, treated with course of oral vancomycin with improvement in symptoms.  7. Traumatic left tibia/fibular fracture:  · Status post ORIF 12/6/17  · Specimen was sent for pathologic review, negative for evidence of malignancy  8. Hypercalcemia:  · Suspect hypercalcemia of malignancy, calcium in  10-11 range previously.  · Calcium normalized following initiation of monthly Xgeva on 1/23/18.  9. Chemotherapy-induced mucositis:  · Patient had a minimal degree of mucositis with cycle 2.  The patient has magic mouthwash to use as needed.  No subsequent mucositis.  10. Recurrent UTI, bladder wall thickening on CT:  · Patient had an enterococcal UTI on 3/2/18 sensitive to nitrofurantoin and received treatment, unclear how long.  · Recurrent UTI 3/20/18 with urine culture growing Klebsiella, initially treated with nitrofurantoin, transitioned to Levaquin.  · CT 4/4/18 with diffuse bladder wall thickening with increased nodular thickening at the left base.  Referral to urogynecology Dr. May Johnson.  She was placed on a prophylactic dose of trimethoprim 100 mg daily, bladder wall thickening felt to be related to recent recurrent urinary tract infections.  · Patient with urinary symptoms, treated with course of Macrobid at the end of December 2018, urine culture however was  negative 12/31/18.  11.   Mobility:  · The patient underwent an intensive course of rehabilitation at [facility].  She graduated from her outpatient course November 2018.  · Overall the patient has improved dramatically in terms of mobility, now able to ambulate without the use of a walker and is achieving some independent activity.  12.  Depression:  · The patient weaned herself off of Cymbalta and reports that her symptoms remain stable.  13. Hand-foot syndrome secondary to Xeloda:  · Agent continues with frequent application of emollient cream to the hands and feet  · She is wearing socks at night, encouraged her again to wear gloves as well.  We discussed this at the last 2 visits however she did not do as requested for unclear reasons.  Her  is encouraging her to do this as well.  14. Elevated liver function studies:  · Liver function studies increased 8/20/19 with ALT 98, AST 70, normal total bilirubin.  · Negative viral hepatitis A, B, and C panel 8/23/18  · Likely related to hepatic steatosis seen on CT, no definitive evidence of metastatic liver lesions.   · Subsequent improvement in LFTs  · Today left teeth are normal.  15. Chemotherapy induced leukopenia:  · Patient with mild leukopenia secondary to Xeloda, WBC 3.84, ANC adequate at 2.07.    Plan:  1. Delay restart of Xeloda times 1 week. 4/3/19 Begin cycle 18 Xeloda with 1500 mg in AM and 100mg in PM for 7 days on followed by 7 days off  2. Monthly Xgeva today  3. Continue Eliquis 5 mg twice daily  4. Increase use of emollient cream on the hands and wear gloves at night  5. In 4 weeks MD visit with CBC, CMP, magnesium, phosphorus.  The patient will be due for Xgeva.  6. Repeat CT scan and bone scan in 3 weeks.

## 2019-03-27 RX ORDER — GABAPENTIN 300 MG/1
CAPSULE ORAL
Qty: 90 CAPSULE | Refills: 0 | OUTPATIENT
Start: 2019-03-27

## 2019-04-17 ENCOUNTER — OFFICE VISIT (OUTPATIENT)
Dept: SLEEP MEDICINE | Facility: HOSPITAL | Age: 59
End: 2019-04-17
Attending: INTERNAL MEDICINE

## 2019-04-17 VITALS
HEIGHT: 62 IN | WEIGHT: 194 LBS | BODY MASS INDEX: 35.7 KG/M2 | SYSTOLIC BLOOD PRESSURE: 128 MMHG | HEART RATE: 91 BPM | DIASTOLIC BLOOD PRESSURE: 87 MMHG

## 2019-04-17 DIAGNOSIS — G47.33 OSA ON CPAP: Primary | ICD-10-CM

## 2019-04-17 DIAGNOSIS — Z99.89 OSA ON CPAP: Primary | ICD-10-CM

## 2019-04-17 DIAGNOSIS — I10 ESSENTIAL HYPERTENSION: ICD-10-CM

## 2019-04-17 PROCEDURE — G0463 HOSPITAL OUTPT CLINIC VISIT: HCPCS

## 2019-04-17 NOTE — PROGRESS NOTES
SLEEP CLINIC FOLLOW UP PROGRESS NOTE.    [facility] SLEEP MEDICINE    Martha Davey  58 y.o.  female  1960    PCP: Jazmine Chanel MD      Date of visit: 4/17/2019      INTERM HISTORY:  This is a 58 y.o. years old patient who has a history of sleep apnea and is on CPAP.  Patient reports poor compliance with the device.  Unfortunately she is unable to use the CPAP because the mask leak and she is having a full facemask.  He used to use the CPAP on a regular basis before her surgery.  I have talked to the patient about mask fitting and advised she can try dream wear full facemask..     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   • Charcot-Saloni-Tooth disease    • CPAP (continuous positive airway pressure) dependence    • GERD (gastroesophageal reflux disease)    • H/O Colon polyps    • H/O Uterine fibroid    • Heart murmur    • Hyperlipidemia    • Hypertension    • PONV (postoperative nausea and vomiting)        MEDICATIONS: reviewed by me    Current Outpatient Medications:   •  acetaminophen (TYLENOL) 500 MG tablet, Take 500 mg by mouth Every 6 (Six) Hours As Needed for Mild Pain ., Disp: , Rfl:   •  apixaban (ELIQUIS) 5 MG tablet tablet, Take 1 tablet by mouth Every 12 (Twelve) Hours., Disp: 60 tablet, Rfl: 5  •  calcium carbonate (OS-CARY) 600 MG tablet, Take 600 mg by mouth 2 (Two) Times a Day With Meals., Disp: , Rfl:   •  capecitabine (XELODA) 500 MG chemo tablet, Take 3 tabs (1500 mg) po twice a day for 7 days on then 7 days off., Disp: 84 tablet, Rfl: 3  •  cholecalciferol (VITAMIN D3) 1000 units tablet, Take 1,000 Units by mouth Daily., Disp: , Rfl:   •   cyclobenzaprine (FLEXERIL) 10 MG tablet, TAKE 1 TABLET BY MOUTH THREE TIMES DAILY AS NEEDED FOR MUSCLE SPASMS, Disp: 30 tablet, Rfl: 0  •  diazePAM (VALIUM) 10 MG tablet, Take 1 tablet by mouth Every 12 (Twelve) Hours As Needed for Anxiety., Disp: 10 tablet, Rfl: 1  •  diphenoxylate-atropine (LOMOTIL) 2.5-0.025 MG per tablet, Take 1 tablet by mouth 4 (Four) Times a Day As Needed for Diarrhea., Disp: 60 tablet, Rfl: 2  •  FLONASE ALLERGY RELIEF 50 MCG/ACT nasal spray, 2 sprays into each nostril daily., Disp: , Rfl: 11  •  gabapentin (NEURONTIN) 300 MG capsule, TAKE 1 CAPSULE BY MOUTH THREE TIMES DAILY, Disp: 90 capsule, Rfl: 0  •  HYDROmorphone (DILAUDID) 4 MG tablet, Take 1 tablet by mouth Every 4 (Four) Hours As Needed for Moderate Pain ., Disp: 60 tablet, Rfl: 0  •  mesalamine (LIALDA) 1.2 g EC tablet, TAKE 1 TABLET BY MOUTH TWICE DAILY, Disp: 180 tablet, Rfl: 0  •  nitrofurantoin (MACRODANTIN) 100 MG capsule, Take 1 capsule by mouth 2 (Two) Times a Day., Disp: 20 capsule, Rfl: 0  •  nitrofurantoin, macrocrystal-monohydrate, (MACROBID) 100 MG capsule, Take 1 capsule by mouth 2 (Two) Times a Day., Disp: 10 capsule, Rfl: 0  •  omeprazole (PriLOSEC) 40 MG capsule, TAKE 1 CAPSULE DAILY, Disp: 90 capsule, Rfl: 2  •  ondansetron ODT (ZOFRAN-ODT) 8 MG disintegrating tablet, Take 1 tablet by mouth Every 8 (Eight) Hours As Needed for Nausea or Vomiting., Disp: 30 tablet, Rfl: 1  •  phenazopyridine (PYRIDIUM) 200 MG tablet, Take 200 mg by mouth 3 (Three) Times a Day As Needed for bladder spasms., Disp: , Rfl:   •  prochlorperazine (COMPAZINE) 10 MG tablet, Take 1 tablet by mouth Every 6 (Six) Hours As Needed for Nausea or Vomiting., Disp: 30 tablet, Rfl: 0  •  sennosides-docusate sodium (SENOKOT-S) 8.6-50 MG tablet, Take 2 tablets by mouth 2 (Two) Times a Day., Disp: 90 tablet, Rfl: 2  •  traMADol (ULTRAM) 50 MG tablet, Take 1 tablet by mouth Every 8 (Eight) Hours As Needed for Moderate Pain ., Disp: 90 tablet, Rfl: 2  •   "trimethoprim (TRIMPEX) 100 MG tablet, Take 100 mg by mouth Daily., Disp: , Rfl: 2  •  Tuberculin PPD (APLISOL ID), Inject  into the skin., Disp: , Rfl:   •  Unable to find, SWISH AND SPIT 5 ML PO Q 2 H PRN, Disp: , Rfl: 0    Allergies   Allergen Reactions   • Hydrocodone Nausea Only   • Morphine And Related Nausea And Vomiting    reviewed by me    SOCIAL, FAMILY HISTORY: Medical records are reviewed and noted by me.    REVIEW OF SYSTEMS:   [place] Sleepiness Scale :Total score: 9   Snoring: yes  Feels refreshed after waking up: no  Morning headache: no  Nasal congestion: no  Leg movements: no    PHYSICAL EXAMINATION:  Vitals:    04/17/19 1300   BP: 128/87   Pulse: 91   Weight: 88 kg (194 lb)   Height: 156.2 cm (61.5\")    Body mass index is 36.06 kg/m².    HEENT: Unremarkable, pupils are round equal and reacting to light, nasal passage is clear   NECK: No lymphadenopathy, throat is clear, oral airway Mallampati class III  RESPRATORY SYSTEM: Breath sounds are equal on both sides and are normal, no wheezes or crackles  CARDIOVASULAR SYSTEM: Heart rate is regular without murmur  ABDOMEN: Soft, no ascites, no hepatosplenomegaly.  EXTREMITIES: No cyanosis, clubbing or edema       ASSESSMENT AND PLAN:  · Obstructive sleep apnea, patient is using the CPAP with poor compliance for treatment of sleep apnea.  I have reviewed the smart card down load and discussed with the patient the download data and encouarged the patient to continue to use the CPAP. The residual AHI is acceptable.  The device is benefiting the patient.  I have asked the patient to try dream wear facemask.  She will see me in about 6 weeks for another compliance check.  The prescription for supplies has been sent to the [facility].  The [employer] company is [employer]  · Obesity, I have discussed weight reduction and the health benefits.  I have also discuss the relationship between the weight and the sleep apnea and encouraged the patient to lose weight "   ·         Alber Orozco MD, [facility]  Sleep Medicine.  4/17/2019 ,

## 2019-04-19 RX ORDER — CYCLOBENZAPRINE HCL 10 MG
TABLET ORAL
Qty: 30 TABLET | Refills: 0 | Status: SHIPPED | OUTPATIENT
Start: 2019-04-19 | End: 2019-05-06

## 2019-04-22 DIAGNOSIS — Z17.1 MALIGNANT NEOPLASM OF OVERLAPPING SITES OF RIGHT BREAST IN FEMALE, ESTROGEN RECEPTOR NEGATIVE (HCC): ICD-10-CM

## 2019-04-22 DIAGNOSIS — C50.811 MALIGNANT NEOPLASM OF OVERLAPPING SITES OF RIGHT BREAST IN FEMALE, ESTROGEN RECEPTOR NEGATIVE (HCC): ICD-10-CM

## 2019-04-24 ENCOUNTER — HOSPITAL ENCOUNTER (OUTPATIENT)
Dept: CT IMAGING | Facility: HOSPITAL | Age: 59
Discharge: HOME | End: 2019-04-24
Admitting: NURSE PRACTITIONER

## 2019-04-24 ENCOUNTER — HOSPITAL ENCOUNTER (OUTPATIENT)
Dept: NUCLEAR MEDICINE | Facility: HOSPITAL | Age: 59
Discharge: HOME | End: 2019-04-24
Attending: INTERNAL MEDICINE

## 2019-04-24 DIAGNOSIS — Z17.1 MALIGNANT NEOPLASM OF OVERLAPPING SITES OF RIGHT BREAST IN FEMALE, ESTROGEN RECEPTOR NEGATIVE (HCC): ICD-10-CM

## 2019-04-24 DIAGNOSIS — C79.51 BONE METASTASES: ICD-10-CM

## 2019-04-24 DIAGNOSIS — C50.811 MALIGNANT NEOPLASM OF OVERLAPPING SITES OF RIGHT BREAST IN FEMALE, ESTROGEN RECEPTOR NEGATIVE (HCC): ICD-10-CM

## 2019-04-24 LAB — CREAT BLDA-MCNC: 0.9 MG/DL (ref 0.6–1.3)

## 2019-04-24 PROCEDURE — 71260 CT THORAX DX C+: CPT

## 2019-04-24 PROCEDURE — A9503 TC99M MEDRONATE: HCPCS | Performed by: INTERNAL MEDICINE

## 2019-04-24 PROCEDURE — 82565 ASSAY OF CREATININE: CPT

## 2019-04-24 PROCEDURE — 78306 BONE IMAGING WHOLE BODY: CPT

## 2019-04-24 PROCEDURE — 25010000002 IOPAMIDOL 61 % SOLUTION: Performed by: NURSE PRACTITIONER

## 2019-04-24 PROCEDURE — 0 TECHNETIUM MEDRONATE KIT: Performed by: INTERNAL MEDICINE

## 2019-04-24 PROCEDURE — 74177 CT ABD & PELVIS W/CONTRAST: CPT

## 2019-04-24 RX ORDER — TC 99M MEDRONATE 20 MG/10ML
20.7 INJECTION, POWDER, LYOPHILIZED, FOR SOLUTION INTRAVENOUS
Status: COMPLETED | OUTPATIENT
Start: 2019-04-24 | End: 2019-04-24

## 2019-04-24 RX ADMIN — Medication 20.7 MILLICURIE: at 12:01

## 2019-04-24 RX ADMIN — IOPAMIDOL 85 ML: 612 INJECTION, SOLUTION INTRAVENOUS at 13:00

## 2019-04-26 ENCOUNTER — DOCUMENTATION (OUTPATIENT)
Dept: ONCOLOGY | Facility: CLINIC | Age: 59
End: 2019-04-26

## 2019-04-26 RX ORDER — CAPECITABINE 500 MG/1
TABLET, FILM COATED ORAL
Qty: 70 TABLET | Refills: 3 | Status: SHIPPED | OUTPATIENT
Start: 2019-04-26 | End: 2019-04-29 | Stop reason: SDUPTHER

## 2019-04-26 NOTE — PROGRESS NOTES
[facility] requesting new rx for pts Capecitabine. She is out of refills. Per last office note-Pt will continue 1500 mg in the AM then 1000 mg in the PA for 7 days on then 7 days off. I have escribed a new rx to [facility].

## 2019-04-29 ENCOUNTER — LAB (OUTPATIENT)
Dept: OTHER | Facility: HOSPITAL | Age: 59
End: 2019-04-29

## 2019-04-29 ENCOUNTER — DOCUMENTATION (OUTPATIENT)
Dept: ONCOLOGY | Facility: CLINIC | Age: 59
End: 2019-04-29

## 2019-04-29 ENCOUNTER — INFUSION (OUTPATIENT)
Dept: ONCOLOGY | Facility: HOSPITAL | Age: 59
End: 2019-04-29

## 2019-04-29 ENCOUNTER — OFFICE VISIT (OUTPATIENT)
Dept: ONCOLOGY | Facility: CLINIC | Age: 59
End: 2019-04-29

## 2019-04-29 VITALS
WEIGHT: 193.3 LBS | BODY MASS INDEX: 36.5 KG/M2 | RESPIRATION RATE: 16 BRPM | SYSTOLIC BLOOD PRESSURE: 146 MMHG | HEIGHT: 61 IN | OXYGEN SATURATION: 97 % | TEMPERATURE: 97.7 F | DIASTOLIC BLOOD PRESSURE: 80 MMHG | HEART RATE: 97 BPM

## 2019-04-29 DIAGNOSIS — Z17.1 MALIGNANT NEOPLASM OF OVERLAPPING SITES OF RIGHT BREAST IN FEMALE, ESTROGEN RECEPTOR NEGATIVE (HCC): Primary | ICD-10-CM

## 2019-04-29 DIAGNOSIS — Z17.1 MALIGNANT NEOPLASM OF OVERLAPPING SITES OF RIGHT BREAST IN FEMALE, ESTROGEN RECEPTOR NEGATIVE (HCC): ICD-10-CM

## 2019-04-29 DIAGNOSIS — C50.811 MALIGNANT NEOPLASM OF OVERLAPPING SITES OF RIGHT BREAST IN FEMALE, ESTROGEN RECEPTOR NEGATIVE (HCC): ICD-10-CM

## 2019-04-29 DIAGNOSIS — C50.811 MALIGNANT NEOPLASM OF OVERLAPPING SITES OF RIGHT BREAST IN FEMALE, ESTROGEN RECEPTOR NEGATIVE (HCC): Primary | ICD-10-CM

## 2019-04-29 LAB
ALBUMIN SERPL-MCNC: 4.4 G/DL (ref 3.5–5.2)
ALBUMIN/GLOB SERPL: 2.1 G/DL
ALP SERPL-CCNC: 70 U/L (ref 39–117)
ALT SERPL W P-5'-P-CCNC: 22 U/L (ref 1–33)
ANION GAP SERPL CALCULATED.3IONS-SCNC: 12 MMOL/L
AST SERPL-CCNC: 20 U/L (ref 1–32)
BASOPHILS # BLD AUTO: 0.04 10*3/MM3 (ref 0–0.2)
BASOPHILS NFR BLD AUTO: 0.9 % (ref 0–1.5)
BILIRUB SERPL-MCNC: 0.3 MG/DL (ref 0.1–1.2)
BUN BLD-MCNC: 16 MG/DL (ref 6–20)
BUN/CREAT SERPL: 17.2 (ref 7–25)
CALCIUM SPEC-SCNC: 9.8 MG/DL (ref 8.6–10.5)
CHLORIDE SERPL-SCNC: 105 MMOL/L (ref 98–107)
CO2 SERPL-SCNC: 26 MMOL/L (ref 22–29)
CREAT BLD-MCNC: 0.93 MG/DL (ref 0.57–1)
DEPRECATED RDW RBC AUTO: 53.1 FL (ref 37–54)
EOSINOPHIL # BLD AUTO: 0.12 10*3/MM3 (ref 0–0.4)
EOSINOPHIL NFR BLD AUTO: 2.7 % (ref 0.3–6.2)
ERYTHROCYTE [DISTWIDTH] IN BLOOD BY AUTOMATED COUNT: 14.9 % (ref 12.3–15.4)
GFR SERPL CREATININE-BSD FRML MDRD: 62 ML/MIN/1.73
GLOBULIN UR ELPH-MCNC: 2.1 GM/DL
GLUCOSE BLD-MCNC: 122 MG/DL (ref 65–99)
HCT VFR BLD AUTO: 41.2 % (ref 34–46.6)
HGB BLD-MCNC: 14.1 G/DL (ref 12–15.9)
IMM GRANULOCYTES # BLD AUTO: 0.01 10*3/MM3 (ref 0–0.05)
IMM GRANULOCYTES NFR BLD AUTO: 0.2 % (ref 0–0.5)
LYMPHOCYTES # BLD AUTO: 0.98 10*3/MM3 (ref 0.7–3.1)
LYMPHOCYTES NFR BLD AUTO: 21.9 % (ref 19.6–45.3)
MAGNESIUM SERPL-MCNC: 1.9 MG/DL (ref 1.6–2.6)
MCH RBC QN AUTO: 34.1 PG (ref 26.6–33)
MCHC RBC AUTO-ENTMCNC: 34.2 G/DL (ref 31.5–35.7)
MCV RBC AUTO: 99.8 FL (ref 79–97)
MONOCYTES # BLD AUTO: 0.36 10*3/MM3 (ref 0.1–0.9)
MONOCYTES NFR BLD AUTO: 8 % (ref 5–12)
NEUTROPHILS # BLD AUTO: 2.97 10*3/MM3 (ref 1.7–7)
NEUTROPHILS NFR BLD AUTO: 66.3 % (ref 42.7–76)
NRBC BLD AUTO-RTO: 0 /100 WBC (ref 0–0.2)
PHOSPHATE SERPL-MCNC: 3.9 MG/DL (ref 2.5–4.5)
PLATELET # BLD AUTO: 266 10*3/MM3 (ref 140–450)
PMV BLD AUTO: 10.2 FL (ref 6–12)
POTASSIUM BLD-SCNC: 4.7 MMOL/L (ref 3.5–5.2)
PROT SERPL-MCNC: 6.5 G/DL (ref 6–8.5)
RBC # BLD AUTO: 4.13 10*6/MM3 (ref 3.77–5.28)
SODIUM BLD-SCNC: 143 MMOL/L (ref 136–145)
WBC NRBC COR # BLD: 4.48 10*3/MM3 (ref 3.4–10.8)

## 2019-04-29 PROCEDURE — 85025 COMPLETE CBC W/AUTO DIFF WBC: CPT | Performed by: INTERNAL MEDICINE

## 2019-04-29 PROCEDURE — 84100 ASSAY OF PHOSPHORUS: CPT | Performed by: INTERNAL MEDICINE

## 2019-04-29 PROCEDURE — 96372 THER/PROPH/DIAG INJ SC/IM: CPT | Performed by: INTERNAL MEDICINE

## 2019-04-29 PROCEDURE — 83735 ASSAY OF MAGNESIUM: CPT | Performed by: INTERNAL MEDICINE

## 2019-04-29 PROCEDURE — 36415 COLL VENOUS BLD VENIPUNCTURE: CPT

## 2019-04-29 PROCEDURE — 25010000002 DENOSUMAB 120 MG/1.7ML SOLUTION: Performed by: INTERNAL MEDICINE

## 2019-04-29 PROCEDURE — 80053 COMPREHEN METABOLIC PANEL: CPT | Performed by: INTERNAL MEDICINE

## 2019-04-29 PROCEDURE — 99215 OFFICE O/P EST HI 40 MIN: CPT | Performed by: INTERNAL MEDICINE

## 2019-04-29 RX ORDER — CAPECITABINE 500 MG/1
TABLET, FILM COATED ORAL
Qty: 84 TABLET | Refills: 3 | Status: SHIPPED | OUTPATIENT
Start: 2019-04-29 | End: 2019-08-28 | Stop reason: SDUPTHER

## 2019-04-29 RX ORDER — TEMAZEPAM 15 MG/1
15 CAPSULE ORAL NIGHTLY PRN
Qty: 30 CAPSULE | Refills: 1 | Status: SHIPPED | OUTPATIENT
Start: 2019-04-29 | End: 2019-06-26 | Stop reason: SDUPTHER

## 2019-04-29 RX ADMIN — DENOSUMAB 120 MG: 120 INJECTION SUBCUTANEOUS at 12:15

## 2019-04-29 NOTE — PROGRESS NOTES
Subjective .     REASONS FOR FOLLOWUP:    1. Stage Ib ([id number]) right breast cancer: Diagnosed May 2010 with excisional biopsy for invasive ductal carcinoma, 1.3 cm, grade 2, ER 90%, CA 80%, HER-2/peter negative (1+ IHC).  Subsequent right mastectomy in July 2010 with no residual breast malignancy, 1/5 sentinel lymph node with micrometastasis (0.25 mm).  Treated in the [facility] system with adjuvant AC ×4 cycles in 2010 (no taxanes administered due to underlying Charcot-Saloni-Tooth with peripheral neuropathy).  Adjuvant Femara (postmenopausal) initiated October 2010 with plan to treat ×10 years.  Genetic testing reportedly negative.  Developed osteopenia treated with Prolia beginning 2/27/13.  2. Recurrent/metastatic disease identified 10/8/17 involving thoracic spine with cord compression at T6, lumbosacral involvement, sternal and right sternoclavicular involvement.  Femara discontinued.  Radiation administered (in the [facility] system) to the thoracic spine beginning 10/19/17 treating T3-T9 to a dose of 24 gray in 6 fractions.  3. Evaluation with MRI 12/8/17 showing persistent T6 cord compression with persistent neurologic compromise requiring surgical treatment 12/11/17 with T6 laminectomy/corpectomy and T3-T9 fusion.  Pathology with metastatic carcinoma of breast origin, ER negative, CA negative, HER-2/peter negative (1+ IHC).  4. Right pulmonary embolism 10/21/17 treated with Lovenox 1 mg/kg (100 mg) every 12 hours.  No evidence of DVT.  Lovenox held due to right gluteus hematoma 3/23/18 through 4/6/18.  Transitioned to oral Eliquis 5mg bid 1/28/19 (as of 2/25/19, patient still using previous supply of Lovenox).  5. Cancer related pain previously on Duragesic 50 µg patch every 72 hours and Dilaudid 4 mg as needed for breakthrough pain.  Narcotics subsequently discontinued with improvement in pain in January 2018.  6. Radiation therapy to L3 dural metastasis and left humerus initiated 1/15/18 (10 fractions),  completed 1/26/18  7. Monthly Xgeva initiated 1/23/18  8. Mild hypercalcemia of malignancy  9. Initiation of palliative oral single agent Xeloda 2/7/18 (2000 mg a.m., 1500 mg p.m. for 14/21 days).  10. 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  In reviewing prior CT scans, has been present for some time.  Monitor for now, if it enlarges consider surgical excision.  11. Cumulative side effects from Xeloda with fatigue, anorexia, diarrhea, increased tearing.  Alteration in dose/schedule with cycle 11 to 1500 mg twice a day for 7 days on followed by 7 days off.    HISTORY OF PRESENT ILLNESS:  The patient is a 58 y.o. year old female who is here for follow-up with the above-mentioned history.    History of Present Illness the patient returns today in follow-up due to begin cycle 19 Xeloda and also continuing on monthly Xgeva that is due today with CT scan, bone scan, and laboratory studies to review.  At the last visit 1 month ago, the patient was experiencing significant hand-foot symptoms.  Treatment was held 1 week and she was asked to resume treatment the following week at a reduced dose of Xeloda 1500 mg in a.m. and 1000 mg in p.m. for 7 days on followed by 7 days off.  The patient did hold treatment however when she resumed, her symptoms had improved and she went back to previous dosing of 1500 mg twice daily.  Fortunately, she has tolerated this relatively well and her hand-foot symptoms are somewhat improved overall compared to 1 month ago.  She has minimal skin peeling.  There is only mild sensitivity noted.  She has however experienced difficulty in regards to her right foot laterally.  Her right fifth metatarsal requires a partial resection due to prior fracture with bone spur that is causing significant pain and if left untreated will  likely cause a skin ulceration per her surgeon Dr. Roman Diana.  I did speak with him on the telephone today and he notes that the procedure will entail a 2 cm incision with minimal shaving of the bone.  He has scheduled this for 2019 to correspond with the patient's off week from chemotherapy.  He felt that holding Eliquis 2 days prior to the procedure and resuming the day after would be acceptable.  Today, the patient notes some mild ongoing fatigue.  She remains very active.  She has been walking approximately 1 mile per day with her .  She notes a stable appetite.  Reflux symptoms however have increased recently and she has been receiving Prilosec daily.  She does note some intermittent diarrhea stools which do not occur more often than once per day and are relieved with Imodium.  She does have trouble falling asleep and feels that this is contributing to her daytime fatigue.  She notes prior adverse reaction to Benadryl cannot use the medication.  She has no other musculoskeletal pain at the present time.    Past Medical History:   Diagnosis Date   • Acute pulmonary embolism, cancer-related 10/2017   • Allergic rhinitis    • Arthritis     Right knee; left shoulder   • Cancer (CMS/[facility]ly [facility]) 2010    Right breast   • Cancer (CMS/Formerly [facility]) 10/2017    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   • Charcot-Saloni-Tooth disease    • CPAP (continuous positive airway pressure) dependence    • GERD (gastroesophageal reflux disease)    • H/O Colon polyps    • H/O Uterine fibroid    • Heart murmur    • Hyperlipidemia    • Hypertension    • PONV (postoperative nausea and vomiting)      Past Surgical History:   Procedure Laterality Date   • BLADDER SURGERY      Bladder lift   • BREAST RECONSTRUCTION, BREAST TISSUE EXPANDER INSERTION Right    • BREAST SURGERY Right 2010    Mastectomy   •  SECTION  ,    • CHOLECYSTECTOMY     • COLONOSCOPY     • HERNIA REPAIR  2015    [facility]   • HYSTERECTOMY       Partial   • KNEE SURGERY Right    • LEG SURGERY Left     Broken   • MASTECTOMY Right 2010   • KY TREAT TIBIAL SHAFT FX, INTRAMED IMPLANT Left 12/6/2017    Procedure: TIBIA INTRAMEDULLARY NAIL/DON INSERTION;  Surgeon: Romero Shanks MD;  Location: [facility];  Service: Orthopedics   • THORACIC DECOMPRESSION POSTERIOR FUSION WITH INSTRUMENTATION N/A 12/11/2017    Procedure: T6 costotransversectomy and decompression with T3 to  T9 posterior spinal fusion with [facility]  Surgeon: Quan Nayak MD;  Location: [facility];  Service:        ONCOLOGIC HISTORY:  (History from previous dates can be found in the separate document.)    Current Outpatient Medications on File Prior to Visit   Medication Sig Dispense Refill   • acetaminophen (TYLENOL) 500 MG tablet Take 500 mg by mouth Every 6 (Six) Hours As Needed for Mild Pain .     • apixaban (ELIQUIS) 5 MG tablet tablet Take 1 tablet by mouth Every 12 (Twelve) Hours. 60 tablet 5   • calcium carbonate (OS-CARY) 600 MG tablet Take 600 mg by mouth 2 (Two) Times a Day With Meals.     • cholecalciferol (VITAMIN D3) 1000 units tablet Take 1,000 Units by mouth Daily.     • diazePAM (VALIUM) 10 MG tablet Take 1 tablet by mouth Every 12 (Twelve) Hours As Needed for Anxiety. 10 tablet 1   • diphenoxylate-atropine (LOMOTIL) 2.5-0.025 MG per tablet Take 1 tablet by mouth 4 (Four) Times a Day As Needed for Diarrhea. 60 tablet 2   • FLONASE ALLERGY RELIEF 50 MCG/ACT nasal spray 2 sprays into each nostril daily.  11   • gabapentin (NEURONTIN) 300 MG capsule TAKE 1 CAPSULE BY MOUTH THREE TIMES DAILY 90 capsule 0   • HYDROmorphone (DILAUDID) 4 MG tablet Take 1 tablet by mouth Every 4 (Four) Hours As Needed for Moderate Pain . 60 tablet 0   • mesalamine (LIALDA) 1.2 g EC tablet TAKE 1 TABLET BY MOUTH TWICE DAILY 180 tablet 0   • omeprazole (PriLOSEC) 40 MG capsule TAKE 1 CAPSULE DAILY 90 capsule 2   • ondansetron ODT (ZOFRAN-ODT) 8 MG  disintegrating tablet Take 1 tablet by mouth Every 8 (Eight) Hours As Needed for Nausea or Vomiting. 30 tablet 1   • prochlorperazine (COMPAZINE) 10 MG tablet Take 1 tablet by mouth Every 6 (Six) Hours As Needed for Nausea or Vomiting. 30 tablet 0   • traMADol (ULTRAM) 50 MG tablet Take 1 tablet by mouth Every 8 (Eight) Hours As Needed for Moderate Pain . 90 tablet 2   • trimethoprim (TRIMPEX) 100 MG tablet Take 100 mg by mouth Daily.  2   • cyclobenzaprine (FLEXERIL) 10 MG tablet TAKE 1 TABLET BY MOUTH THREE TIMES DAILY AS NEEDED FOR MUSCLE SPASMS 30 tablet 0   • nitrofurantoin (MACRODANTIN) 100 MG capsule Take 1 capsule by mouth 2 (Two) Times a Day. 20 capsule 0   • nitrofurantoin, macrocrystal-monohydrate, (MACROBID) 100 MG capsule Take 1 capsule by mouth 2 (Two) Times a Day. 10 capsule 0   • phenazopyridine (PYRIDIUM) 200 MG tablet Take 200 mg by mouth 3 (Three) Times a Day As Needed for bladder spasms.     • sennosides-docusate sodium (SENOKOT-S) 8.6-50 MG tablet Take 2 tablets by mouth 2 (Two) Times a Day. 90 tablet 2   • Tuberculin PPD (APLISOL ID) Inject  into the skin.     • Unable to find SWISH AND SPIT 5 ML PO Q 2 H PRN  0     No current facility-administered medications on file prior to visit.        ALLERGIES:     Allergies   Allergen Reactions   • Hydrocodone Nausea Only   • Morphine And Related Nausea And Vomiting     Social History     Socioeconomic History   • Marital status:      Spouse name: Murray   • Number of children: 3   • Years of education: College   • Highest education level: Not on file   Occupational History     Employer: [employer]     Employer: RETIRED   Tobacco Use   • Smoking status: Never Smoker   • Smokeless tobacco: Never Used   Substance and Sexual Activity   • Alcohol use: Yes     Comment: social   • Drug use: No   • Sexual activity: Defer     Family History   Problem Relation Age of Onset   • Heart disease Mother    • Hyperlipidemia Mother    • Hypertension Mother    •  Diabetes Father    • Charcot-Saloni-Tooth disease Father    • Heart disease Father    • Hypertension Father    • Heart failure Father    • Diabetes Sister    • Heart disease Sister    • Hypertension Sister    • Heart disease Maternal Aunt    • Scoliosis Maternal Aunt    • Heart disease Maternal Uncle    • Diabetes Maternal Grandmother    • Heart disease Maternal Grandmother    • Hypertension Maternal Grandmother    • Uterine cancer Maternal Grandmother    • Heart disease Maternal Grandfather      Review of Systems   Constitutional: Positive for fatigue. Negative for activity change, appetite change, fever and unexpected weight change.   HENT: Negative for congestion, mouth sores, nosebleeds, sore throat and voice change.    Eyes: Negative for discharge.   Respiratory: Negative for cough, shortness of breath and wheezing.    Cardiovascular: Negative for chest pain, palpitations and leg swelling.   Gastrointestinal: Negative for abdominal distention, abdominal pain, blood in stool, constipation, diarrhea, nausea and vomiting.   Endocrine: Negative for cold intolerance and heat intolerance.   Genitourinary: Negative for difficulty urinating, dysuria, frequency and hematuria.   Musculoskeletal: Positive for arthralgias. Negative for back pain, joint swelling and myalgias.   Skin: Positive for rash. Negative for wound.   Neurological: Negative for dizziness, syncope, weakness, light-headedness, numbness and headaches.   Hematological: Negative for adenopathy. Does not bruise/bleed easily.   Psychiatric/Behavioral: Positive for sleep disturbance. Negative for confusion. The patient is not nervous/anxious.          Objective      Vitals:    04/29/19 1107   BP: 146/80   Pulse: 97   Resp: 16   Temp: 97.7 °F (36.5 °C)   SpO2: 97%      Current Status 4/29/2019   ECOG score 1   Pain 0/10    Physical Exam   Constitutional: She is oriented to person, place, and time. She appears well-developed and well-nourished.   The patient is  ambulating with the use of stabilizing poles   HENT:   Mouth/Throat: Oropharynx is clear and moist.   Eyes: Conjunctivae are normal.   Neck: No thyromegaly present.   Cardiovascular: Normal rate and regular rhythm. Exam reveals no gallop and no friction rub.   No murmur heard.  Pulmonary/Chest: Breath sounds normal. No respiratory distress.   Abdominal: Soft. Bowel sounds are normal. She exhibits no distension. There is no tenderness.   Musculoskeletal: She exhibits edema.   Lower extremity braces in place.  Trace bilateral lower extremity edema.   Lymphadenopathy:        Head (right side): No submandibular adenopathy present.     She has no cervical adenopathy.     She has no axillary adenopathy.        Right: No inguinal and no supraclavicular adenopathy present.        Left: No inguinal and no supraclavicular adenopathy present.   Neurological: She is alert and oriented to person, place, and time. No cranial nerve deficit.   Bilateral lower extremity strength, 4+/5   Skin: Skin is warm and dry. No rash noted.   Mild erythema involving the palms of the hands.  Mild degree of skin cracking with minor peeling   Psychiatric: She has a normal mood and affect. Her behavior is normal.       RECENT LABS:  Hematology WBC   Date Value Ref Range Status   04/29/2019 4.48 3.40 - 10.80 10*3/mm3 Final     RBC   Date Value Ref Range Status   04/29/2019 4.13 3.77 - 5.28 10*6/mm3 Final     Hemoglobin   Date Value Ref Range Status   04/29/2019 14.1 12.0 - 15.9 g/dL Final     Hematocrit   Date Value Ref Range Status   04/29/2019 41.2 34.0 - 46.6 % Final     Platelets   Date Value Ref Range Status   04/29/2019 266 140 - 450 10*3/mm3 Final        Lab Results   Component Value Date    GLUCOSE 122 (H) 04/29/2019    BUN 16 04/29/2019    CREATININE 0.93 04/29/2019    EGFRIFNONA 62 04/29/2019    EGFRIFAFRI 80 09/25/2017    BCR 17.2 04/29/2019    K 4.7 04/29/2019    CO2 26.0 04/29/2019    CALCIUM 9.8 04/29/2019    PROTENTOTREF 6.4  09/25/2017    ALBUMIN 4.40 04/29/2019    LABIL2 2.2 09/25/2017    AST 20 04/29/2019    ALT 22 04/29/2019     CT chest abdomen pelvis 4/24/2019: I did personally review CT images today.  IMPRESSION:  1. Stable examination and there is no new metastatic disease. The  sclerotic bone metastases appear stable. There is no lymphadenopathy or  evidence for visceral metastases.  2. The cluster of nodules at the right lower lobe have a slightly  variable appearance since 01/30/2018, but are overall stable. The  appearance is suggestive of mucus plugging.    Bone scan 4/24/2019:  IMPRESSION;  Osseous metastatic disease without evidence for change.    Assessment/Plan      1. Previous Stage Ib ([id number]) right breast cancer:  · Diagnosed May 2010 with excisional biopsy for invasive ductal carcinoma, 1.3 cm, grade 2, ER 90%, MI 80%, HER-2/peter negative (1+ IHC).    · Subsequent right mastectomy in July 2010 with no residual breast malignancy, 1/5 sentinel lymph node with micrometastasis (0.25 mm).    · Treated in the [facility] system with adjuvant AC ×4 cycles in 2010 (no taxanes administered due to underlying Charcot-Saloni-Tooth with peripheral neuropathy).    · Adjuvant Femara (postmenopausal) initiated October 2010 with plan to treat ×10 years.    · Genetic testing reportedly negative.    · Developed osteopenia treated with Prolia beginning 2/27/13. Subsequently discontinued due to identification of metastatic disease.  2. Recurrent/metastatic disease identified 10/8/17:  · Disease involving thoracic spine with cord compression at T6, lumbosacral involvement, sternal and right sternoclavicular involvement.    · Femara discontinued in 10/2017.    · Radiation administered (in the [facility] system) to the thoracic spine beginning 10/19/17 treating T3-T9 to a dose of 24 gray in 6 fractions.  · Evaluation with MRI 12/8/17 showing persistent T6 cord compression with persistent neurologic compromise requiring surgical treatment 12/11/17  with T6 laminectomy/corpectomy and T3-T9 fusion.  Pathology with metastatic carcinoma of breast origin, ER negative, ND negative, HER-2/peter negative (1+ IHC).  · Additional staging evaluation 12/8/17 with no evidence of visceral metastatic disease, bone scan showing involvement of thoracic spine, sternum, left humerus, mid frontal bone.  No plane film correlate of left humerus lesion.  MRI lumbar spine with small intradural L3 metastasis.  CA 15-3 12/6/17- 17.  · Palliative radiation therapy to L3 dural metastasis and left humerus initiated 1/15/18 (10 fractions), completed 1/26/18.  · Hypercalcemia of malignancy with calcium in the 10-11 range.  · Initiation of monthly Xgeva 1/23/18.  · Baseline CT scan 1/30/18 with no evidence of visceral involvement.  Cluster of nodular opacities in the right lower lobe suspected to be infectious or related to bronchiolitis. Bone scan 1/30/18 showed postsurgical change in the thoracic spine, stable uptake in the frontal bone, no new areas of disease.  · Initiation of palliative oral single agent Xeloda 2/7/18 2000 mg a.m., 1500 mg p.m. for 14/21 days.   · Following 3 cycles xeloda, bone scan 4/4/18 showed no change from the prior study.  CT scan 4/4/18 showed a small pericardial effusion of unclear significance as well as a subcutaneous nodule in the right lateral chest wall.  Subsequent evaluation with echocardiogram 4/17/18 showed no evidence of pericardial effusion.  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  We discussed the possibility of surgical excision of the right subcutaneous chest wall lesion for more definitive diagnosis.  Reviewed previous CT images dating back to 12/8/17 and the lesion was present even at that time measuring around 1.7 cm although not commented on in the radiology report.  As this appears to be an  indolent low-grade process unrelated to her breast cancer, recommendee foregoing surgical excision at this time and monitoring this area on future scans.  The patient agreed.    · Following 6 cycles of Xeloda, CT 6/6/18  showed stable findings, no evidence of progressive disease.  There was a comment regarding subcutaneous abnormality in the anterior abdominal wall and this was related to Lovenox injection sites.  Bone scan 6/6/18 showed no interval change.   · CT scan and bone scan 8/13/18 following 9 cycles of Xeloda showed stable findings with no evidence of significant visceral metastases.  Her bone lesions appear stable on bone scan.  The spindle cell neoplasm in her right chest wall actually decreased in size from 2 cm down to 1.6 cm.    · The patient experienced some symptoms of diarrhea, anorexia, generalized weakness during cycle 9 Xeloda it was unclear whether this was related to a viral gastroenteritis or toxicity from treatment.  Symptoms recurred during cycle 10 and treatment was cut short by 2 days.  Symptoms attributed to Xeloda.  With cycle 11, dose and schedule altered to 1500 mg twice daily for 7 days on followed by 7 days off .  · CT scan and bone scan from 10/31/18 following 12 cycles of Xeloda showed stable disease.    · CT scans and bone scan 1/21/19 following 15 cycles of Xeloda showed stable findings.  · The patient returns today in follow-up due for cycle 19 of Xeloda along with monthly Xgeva.  She was experiencing increased difficulty with hand-foot symptoms requiring cycle 18 to be held for 1 week.  Symptoms improved and the patient did not pursue dose reduction as discussed but maintained her previous dose of 1500 mg twice daily.  Despite this, her hand-foot symptoms have remained stable and manageable currently with minimal skin peeling.  She will continue to use emollient cream frequently as well as socks and gloves at night.  We did review her scans from 4/24/2019 with CT scan and bone  scan showing stable findings.  Given her excellent tolerance, we will continue on with treatment as planned.  It is noted that she will require an upcoming surgical procedure in regards to her right fifth metatarsal fracture.  The bone fragment is going to cause an impending ulceration in her foot and she is going to require a bone shaving procedure.  This is scheduled by Dr. Roman Diana for 5/23/2019.  This will correspond with her off week from Xeloda.  He would like for her to hold Eliquis for 2 days before procedure and may resume Eliquis the day after.  I did suggest that we hold her Xeloda for an additional week.  I will be seeing her 4 weeks and we will discuss timing to resume Xeloda at that point pending her status.  We will plan repeat scans at the end of 3 additional cycles (cycle 21).  3. Right pulmonary embolism:  · Diagnosed on CT angiogram 10/21/17 involving small right lower lobe pulmonary artery.  Lower extremity Dopplers negative.  · Bilateral lower extremity Dopplers negative again 12/5/17.  · Received chronic Lovenox 1 mg/kg twice per day, transition to oral Eliquis in February 2019, continuing on Eliquis 5 mg twice daily.  · As above, patient will hold Eliquis 2 days prior to her upcoming surgical procedure on 5/23/2019 and will resume Eliquis the day after the procedure.  4. Cancer related pain:  · Previously receiving Duragesic 50 µg patch every 72 hours along with Dilaudid 4 mg as needed for breakthrough pain  · The patient's pain improved over time and she was able to discontinue both Duragesic and Dilaudid in the interval.  · Patient does take occasional Flexeril at bedtime due to back spasm/pain when she has been more active.  · The patient does have some occasional aggravation of her chronic back pain with significant rehabilitation activity and requires an occasional dose of Dilaudid.  No narcotic requirements recently.  5. Chemotherapy-induced diarrhea with subsequent C. difficile  colitis:  · The patient developed initial diarrhea related to Xeloda at regular dosing.  · Symptoms improved on reduced dose Xeloda  · Flare of symptoms in October 2018 with apparent finding of C. difficile colitis by GI, treated with course of oral vancomycin with improvement in symptoms.  · Patient notes minimal intermittent diarrhea on Xeloda requiring occasional dosing of Imodium.  6. Traumatic left tibia/fibular fracture:  · Status post ORIF 12/6/17  · Specimen was sent for pathologic review, negative for evidence of malignancy  7. Hypercalcemia:  · Suspect hypercalcemia of malignancy, calcium in  10-11 range previously.  · Calcium normalized following initiation of monthly Xgeva on 1/23/18.  8. Chemotherapy-induced mucositis:  · Patient had a minimal degree of mucositis with cycle 2.  The patient has magic mouthwash to use as needed.  No subsequent mucositis.  9. Recurrent UTI, bladder wall thickening on CT:  · Patient had an enterococcal UTI on 3/2/18 sensitive to nitrofurantoin and received treatment, unclear how long.  · Recurrent UTI 3/20/18 with urine culture growing Klebsiella, initially treated with nitrofurantoin, transitioned to Levaquin.  · CT 4/4/18 with diffuse bladder wall thickening with increased nodular thickening at the left base.  Referral to urogynecology Dr. May Johnson.  She was placed on a prophylactic dose of trimethoprim 100 mg daily, bladder wall thickening felt to be related to recent recurrent urinary tract infections.  · Patient with urinary symptoms, treated with course of Macrobid at the end of December 2018, urine culture however was negative 12/31/18.  10.   Mobility:  · The patient underwent an intensive course of rehabilitation at [facility].  She graduated from her outpatient course November 2018.  · Overall the patient has improved dramatically in terms of mobility, now walking around 1 mile per day without assistance.  11.  Depression:  · The patient weaned herself off of  Cymbalta and reports that her symptoms remain stable.  12. 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  Symptoms did improve and she continued on the same dose.  Currently, symptoms are moderate with minimal skin peeling noted on exam.  She continues to use emollient cream frequently as well as socks and gloves at night.  13. Elevated liver function studies:  · Liver function studies increased 8/20/19 with ALT 98, AST 70, normal total bilirubin.  · Negative viral hepatitis A, B, and C panel 8/23/18  · Likely related to hepatic steatosis seen on CT, no definitive evidence of metastatic liver lesions.   · Subsequent improvement in LFTs  · Today, LFTs are normal  14. Chemotherapy induced leukopenia:  · Patient with mild leukopenia secondary to Xeloda, WBC 4.48, ANC 2.97  15. GERD:  · Patient with increased symptoms recently continuing on Prilosec 40 mg daily.  Advised patient to increase temporarily to 40 mg twice daily.  16. Insomnia:  · Patient with prior paradoxical reaction to Benadryl  · Will prescribe temazepam 15 mg nightly as needed, #30 with 1 refill.    Plan:  1. On 5/1/19, begin cycle 19 Xeloda continuing with 1500 mg twice a day 7 days on followed by 7 days off  2. Monthly Xgeva today  3. Continue Eliquis 5 mg twice daily.  4. Continue use of emollient cream on the hands and feet and continue to wear socks and gloves at night  5. Prescription sent for temazepam 15 mg nightly as needed insomnia #30 with 1 refill.  6. Increase Prilosec to 40 mg twice daily temporarily  7. Proceed with surgery with Dr. Roman Diana on 5/23/2019 for minor resection of right fifth metatarsal bone fragment.  Patient will hold Eliquis 2 days prior to surgery and resume Eliquis the day after surgery.  Hold chemotherapy for 2 weeks postoperatively.  8. In 4  weeks MD visit with CBC, CMP, magnesium, phosphorus.  The patient will be due for Xgeva.  Pending her status after surgery we will discuss timing to resume Xeloda.

## 2019-04-29 NOTE — PROGRESS NOTES
Call rec from Dr Hancock-there was talk about decreasing pts Xeloda dose. He states pt will stay at 1500 mg BID 7 days on then 7 days off. I sent an rx to [facility] on Friday for the decreased dose. I have sent another rx for the increase.

## 2019-04-30 ENCOUNTER — TELEPHONE (OUTPATIENT)
Dept: INTERNAL MEDICINE | Facility: CLINIC | Age: 59
End: 2019-04-30

## 2019-04-30 DIAGNOSIS — Z17.1 MALIGNANT NEOPLASM OF OVERLAPPING SITES OF RIGHT BREAST IN FEMALE, ESTROGEN RECEPTOR NEGATIVE (HCC): ICD-10-CM

## 2019-04-30 DIAGNOSIS — R73.9 HYPERGLYCEMIA: Primary | ICD-10-CM

## 2019-04-30 DIAGNOSIS — I10 ESSENTIAL HYPERTENSION: ICD-10-CM

## 2019-04-30 DIAGNOSIS — D75.89 MACROCYTOSIS: ICD-10-CM

## 2019-04-30 DIAGNOSIS — C50.811 MALIGNANT NEOPLASM OF OVERLAPPING SITES OF RIGHT BREAST IN FEMALE, ESTROGEN RECEPTOR NEGATIVE (HCC): ICD-10-CM

## 2019-04-30 DIAGNOSIS — E78.5 HYPERLIPIDEMIA, UNSPECIFIED HYPERLIPIDEMIA TYPE: ICD-10-CM

## 2019-04-30 NOTE — TELEPHONE ENCOUNTER
Can b12, folate, hgbA1c, tsh, and lipid be added to the blood in lab from yesterday. Order is in her chart. If this can be added then she will not need an additional lab draw.

## 2019-05-01 LAB
CHOLEST SERPL-MCNC: 183 MG/DL (ref 0–200)
FOLATE SERPL-MCNC: 15 NG/ML (ref 4.78–24.2)
HBA1C MFR BLD: 5.7 % (ref 4.8–5.6)
HDLC SERPL-MCNC: 72 MG/DL (ref 40–60)
LDLC SERPL CALC-MCNC: 79 MG/DL (ref 0–100)
LDLC/HDLC SERPL: 1.1 {RATIO}
TRIGL SERPL-MCNC: 158 MG/DL (ref 0–150)
TSH SERPL DL<=0.005 MIU/L-ACNC: 4.65 MIU/ML (ref 0.27–4.2)
VIT B12 SERPL-MCNC: 744 PG/ML (ref 211–946)
VLDLC SERPL CALC-MCNC: 31.6 MG/DL

## 2019-05-06 ENCOUNTER — OFFICE VISIT (OUTPATIENT)
Dept: INTERNAL MEDICINE | Facility: CLINIC | Age: 59
End: 2019-05-06

## 2019-05-06 VITALS
HEART RATE: 105 BPM | DIASTOLIC BLOOD PRESSURE: 84 MMHG | OXYGEN SATURATION: 98 % | SYSTOLIC BLOOD PRESSURE: 126 MMHG | BODY MASS INDEX: 37.05 KG/M2 | WEIGHT: 196 LBS

## 2019-05-06 DIAGNOSIS — Z17.1 MALIGNANT NEOPLASM OF OVERLAPPING SITES OF RIGHT BREAST IN FEMALE, ESTROGEN RECEPTOR NEGATIVE (HCC): ICD-10-CM

## 2019-05-06 DIAGNOSIS — M79.672 LEFT FOOT PAIN: ICD-10-CM

## 2019-05-06 DIAGNOSIS — Z12.11 COLON CANCER SCREENING: ICD-10-CM

## 2019-05-06 DIAGNOSIS — Z23 IMMUNIZATION, VIRAL DISEASE: ICD-10-CM

## 2019-05-06 DIAGNOSIS — E03.8 SUBCLINICAL HYPOTHYROIDISM: ICD-10-CM

## 2019-05-06 DIAGNOSIS — Z00.00 HEALTHCARE MAINTENANCE: Primary | ICD-10-CM

## 2019-05-06 DIAGNOSIS — E78.5 HYPERLIPIDEMIA, UNSPECIFIED HYPERLIPIDEMIA TYPE: ICD-10-CM

## 2019-05-06 DIAGNOSIS — G47.33 OSA ON CPAP: ICD-10-CM

## 2019-05-06 DIAGNOSIS — K63.5 POLYP OF COLON, UNSPECIFIED PART OF COLON, UNSPECIFIED TYPE: ICD-10-CM

## 2019-05-06 DIAGNOSIS — Z99.89 OSA ON CPAP: ICD-10-CM

## 2019-05-06 DIAGNOSIS — C50.811 MALIGNANT NEOPLASM OF OVERLAPPING SITES OF RIGHT BREAST IN FEMALE, ESTROGEN RECEPTOR NEGATIVE (HCC): ICD-10-CM

## 2019-05-06 DIAGNOSIS — I10 ESSENTIAL HYPERTENSION: ICD-10-CM

## 2019-05-06 PROCEDURE — 93000 ELECTROCARDIOGRAM COMPLETE: CPT | Performed by: INTERNAL MEDICINE

## 2019-05-06 PROCEDURE — 99396 PREV VISIT EST AGE 40-64: CPT | Performed by: INTERNAL MEDICINE

## 2019-05-06 PROCEDURE — 90715 TDAP VACCINE 7 YRS/> IM: CPT | Performed by: INTERNAL MEDICINE

## 2019-05-06 PROCEDURE — 90471 IMMUNIZATION ADMIN: CPT | Performed by: INTERNAL MEDICINE

## 2019-05-06 RX ORDER — TRAMADOL HYDROCHLORIDE 50 MG/1
50 TABLET ORAL EVERY 8 HOURS PRN
Qty: 90 TABLET | Refills: 4 | Status: SHIPPED | OUTPATIENT
Start: 2019-05-06 | End: 2019-09-26 | Stop reason: SDUPTHER

## 2019-05-06 RX ORDER — GABAPENTIN 300 MG/1
300 CAPSULE ORAL DAILY
Qty: 90 CAPSULE | Refills: 3 | Status: SHIPPED | OUTPATIENT
Start: 2019-05-06 | End: 2020-02-04

## 2019-05-06 NOTE — PROGRESS NOTES
Subjective   CPE  Foot pain  htn  Hyperlipidemia  maria m- on cpap  Breast cancer    Martha Davey is a 58 y.o. female who presents for a complete physical exam. She has several chronic issues. She has mets breast cancer and is on chronic chemotherapy for this. She is on xeloda and xgeva and tolerating these fairly well. She had a recent bone scan as well as CT chest abdomen and pelvis with no evidence of new disease/ stability.   She had metastasis to the spine and is s/p surgical and xrt to the area. She takes tramadol in the evening with a sleep aid and this works well for pain relief.   She has chronic left  foot pain related to a 5th metatarsal abnormality. She is to have surgery on this. She is to hold xarelto 2 day prior and resume one day after the procedure. She has a history of hypertension. Her bp is normotensive at this time. She has subclinical hypothyroidism.         Review of Systems   Constitutional: Positive for fatigue.   HENT: Negative.    Eyes: Negative.    Respiratory: Negative.    Gastrointestinal: Negative.    Endocrine: Negative.    Genitourinary: Negative.    Musculoskeletal: Negative.    Skin: Negative.    Allergic/Immunologic: Negative.    Neurological: Negative.    Hematological: Negative.    Psychiatric/Behavioral: Negative.        The following portions of the patient's history were reviewed and updated as appropriate: allergies, current medications, past family history, past medical history, past social history, past surgical history and problem list.     Patient Active Problem List   Diagnosis   • Malignant neoplasm of overlapping sites of right breast in female, estrogen receptor negative (CMS/HCC)   • Hematuria   • Hyperlipidemia   • Hypertension   • Hereditary sensorimotor neuropathy   • Hyperglycemia   • MARIA M on CPAP   • Gastroesophageal reflux disease   • Colon polyp   • Diverticulitis of colon with perforation   • Foot pain   • Osteoarthritis of knee   • Leukocytosis   •  Osteoarthritis of shoulder   • Senile osteopenia   • Long term current use of aromatase inhibitor   • Chronic right-sided thoracic back pain   • Closed fracture of left fibula and tibia   • Closed displaced spiral fracture of shaft of left tibia   • Cancer associated pain   • Cord compression (CMS/HCC)   • Drug induced constipation   • Closed T6 spinal fracture (CMS/HCC)   • History of pulmonary embolism   • S/P ORIF (open reduction internal fixation) fracture   • Bone metastases (CMS/HCC)   • Surgery follow-up examination   • Dysuria   • Hypercalcemia of malignancy   • Pericardial effusion   • Other fatigue       Past Medical History:   Diagnosis Date   • Acute pulmonary embolism, cancer-related 10/2017   • Allergic rhinitis    • Arthritis     Right knee; left shoulder   • Cancer (CMS/HCC) 2010    Right breast   • Cancer (CMS/HCC) 10/2017    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   • Charcot-Saloni-Tooth disease    • CPAP (continuous positive airway pressure) dependence    • GERD (gastroesophageal reflux disease)    • H/O Colon polyps    • H/O Uterine fibroid    • Heart murmur    • Hyperlipidemia    • Hypertension    • PONV (postoperative nausea and vomiting)        Past Surgical History:   Procedure Laterality Date   • BLADDER SURGERY      Bladder lift   • BREAST RECONSTRUCTION, BREAST TISSUE EXPANDER INSERTION Right    • BREAST SURGERY Right 2010    Mastectomy   •  SECTION  ,    • CHOLECYSTECTOMY     • COLONOSCOPY     • HERNIA REPAIR  2015    [facility]   • HYSTERECTOMY      Partial   • KNEE SURGERY Right    • LEG SURGERY Left     Broken   • MASTECTOMY Right    • NH TREAT TIBIAL SHAFT FX, INTRAMED IMPLANT Left 2017    Procedure: TIBIA INTRAMEDULLARY NAIL/DON INSERTION;  Surgeon: Romero Shanks MD;  Location: [facility];  Service: Orthopedics   • THORACIC DECOMPRESSION POSTERIOR FUSION WITH INSTRUMENTATION N/A 2017    Procedure: T6 costotransversectomy and  decompression with T3 to  T9 posterior spinal fusion with instrumentation with Jennifer Gonzalez and Nael Dennis Cellsaver;  Surgeon: Quan Nayak MD;  Location: [facility];  Service:        Family History   Problem Relation Age of Onset   • Heart disease Mother    • Hyperlipidemia Mother    • Hypertension Mother    • Diabetes Father    • Charcot-Saloni-Tooth disease Father    • Heart disease Father    • Hypertension Father    • Heart failure Father    • Diabetes Sister    • Heart disease Sister    • Hypertension Sister    • Heart disease Maternal Aunt    • Scoliosis Maternal Aunt    • Heart disease Maternal Uncle    • Diabetes Maternal Grandmother    • Heart disease Maternal Grandmother    • Hypertension Maternal Grandmother    • Uterine cancer Maternal Grandmother    • Heart disease Maternal Grandfather        Social History     Socioeconomic History   • Marital status:      Spouse name: Murray   • Number of children: 3   • Years of education: College   • Highest education level: Not on file   Occupational History     Employer: [employer]     Employer: RETIRED   Tobacco Use   • Smoking status: Never Smoker   • Smokeless tobacco: Never Used   Substance and Sexual Activity   • Alcohol use: Yes     Comment: social   • Drug use: No   • Sexual activity: Defer       Current Outpatient Medications on File Prior to Visit   Medication Sig Dispense Refill   • acetaminophen (TYLENOL) 500 MG tablet Take 500 mg by mouth Every 6 (Six) Hours As Needed for Mild Pain .     • apixaban (ELIQUIS) 5 MG tablet tablet Take 1 tablet by mouth Every 12 (Twelve) Hours. 60 tablet 5   • calcium carbonate (OS-CARY) 600 MG tablet Take 600 mg by mouth 2 (Two) Times a Day With Meals.     • capecitabine (XELODA) 500 MG chemo tablet Take 3 tabs (1500 mg) po twice a day for 7 days on then 7 days off. 84 tablet 3   • cholecalciferol (VITAMIN D3) 1000 units tablet Take 1,000 Units by mouth Daily.     • diazePAM (VALIUM) 10 MG tablet Take 1 tablet  by mouth Every 12 (Twelve) Hours As Needed for Anxiety. 10 tablet 1   • diphenoxylate-atropine (LOMOTIL) 2.5-0.025 MG per tablet Take 1 tablet by mouth 4 (Four) Times a Day As Needed for Diarrhea. 60 tablet 2   • FLONASE ALLERGY RELIEF 50 MCG/ACT nasal spray 2 sprays into each nostril daily.  11   • mesalamine (LIALDA) 1.2 g EC tablet TAKE 1 TABLET BY MOUTH TWICE DAILY 180 tablet 0   • omeprazole (PriLOSEC) 40 MG capsule TAKE 1 CAPSULE DAILY 90 capsule 2   • ondansetron ODT (ZOFRAN-ODT) 8 MG disintegrating tablet Take 1 tablet by mouth Every 8 (Eight) Hours As Needed for Nausea or Vomiting. 30 tablet 1   • phenazopyridine (PYRIDIUM) 200 MG tablet Take 200 mg by mouth 3 (Three) Times a Day As Needed for bladder spasms.     • prochlorperazine (COMPAZINE) 10 MG tablet Take 1 tablet by mouth Every 6 (Six) Hours As Needed for Nausea or Vomiting. 30 tablet 0   • sennosides-docusate sodium (SENOKOT-S) 8.6-50 MG tablet Take 2 tablets by mouth 2 (Two) Times a Day. 90 tablet 2   • temazepam (RESTORIL) 15 MG capsule Take 1 capsule by mouth At Night As Needed for Sleep. 30 capsule 1   • trimethoprim (TRIMPEX) 100 MG tablet Take 100 mg by mouth Daily.  2   • [DISCONTINUED] gabapentin (NEURONTIN) 300 MG capsule TAKE 1 CAPSULE BY MOUTH THREE TIMES DAILY 90 capsule 0   • [DISCONTINUED] traMADol (ULTRAM) 50 MG tablet Take 1 tablet by mouth Every 8 (Eight) Hours As Needed for Moderate Pain . 90 tablet 2   • Tuberculin PPD (APLISOL ID) Inject  into the skin.     • Unable to find SWISH AND SPIT 5 ML PO Q 2 H PRN  0   • [DISCONTINUED] cyclobenzaprine (FLEXERIL) 10 MG tablet TAKE 1 TABLET BY MOUTH THREE TIMES DAILY AS NEEDED FOR MUSCLE SPASMS 30 tablet 0   • [DISCONTINUED] HYDROmorphone (DILAUDID) 4 MG tablet Take 1 tablet by mouth Every 4 (Four) Hours As Needed for Moderate Pain . 60 tablet 0   • [DISCONTINUED] nitrofurantoin (MACRODANTIN) 100 MG capsule Take 1 capsule by mouth 2 (Two) Times a Day. 20 capsule 0   • [DISCONTINUED]  nitrofurantoin, macrocrystal-monohydrate, (MACROBID) 100 MG capsule Take 1 capsule by mouth 2 (Two) Times a Day. 10 capsule 0     No current facility-administered medications on file prior to visit.        Allergies   Allergen Reactions   • Hydrocodone Nausea Only   • Morphine And Related Nausea And Vomiting       Immunization History   Administered Date(s) Administered   • Flu Vaccine Quad PF >18YRS 09/25/2017, 10/01/2018   • Hepatitis A 03/30/2018   • Influenza TIV (IM) 10/11/2016   • Pneumococcal Conjugate 13-Valent (PCV13) 05/11/2015   • Pneumococcal Polysaccharide (PPSV23) 01/01/2008, 03/30/2018, 10/30/2018   • Tdap 01/01/2008       Objective     /84   Pulse 105   Wt 88.9 kg (196 lb)   LMP  (LMP Unknown)   SpO2 98%   BMI 37.05 kg/m²     Physical Exam   Constitutional: She is oriented to person, place, and time. She appears well-developed and well-nourished.   HENT:   Head: Normocephalic and atraumatic.   Right Ear: External ear normal.   Left Ear: External ear normal.   Mouth/Throat: Oropharynx is clear and moist.   Eyes: EOM are normal. Pupils are equal, round, and reactive to light.   Neck: Normal range of motion. Neck supple.   Cardiovascular: Normal rate, regular rhythm, normal heart sounds and intact distal pulses.   Pulmonary/Chest: Effort normal and breath sounds normal.   Abdominal: Soft. Bowel sounds are normal.   Genitourinary: Vagina normal.   Genitourinary Comments: Uterus surgically absent.    Musculoskeletal: Normal range of motion.   Neurological: She is alert and oriented to person, place, and time.   Skin: Skin is warm and dry.   Dusky brown skin change right nares.   Psychiatric: She has a normal mood and affect. Her behavior is normal. Judgment and thought content normal.   Nursing note and vitals reviewed.    ekg for hypothyroidism, htn, fely. Sinus. 84 bpm. No st changes. Unchanged from prior.     Assessment/Plan   Martha was seen today for annual exam.    Diagnoses and all orders  for this visit:    Healthcare maintenance    Subclinical hypothyroidism  -     T4, Free  -     TSH  -     Thyroid Antibodies    Colon cancer screening  -     Ambulatory Referral For Screening Colonoscopy    Polyp of colon, unspecified part of colon, unspecified type  -     Ambulatory Referral For Screening Colonoscopy    Immunization, viral disease  -     Measles / Mumps / Rubella Immunity; Future    Hyperlipidemia, unspecified hyperlipidemia type    Essential hypertension    MARIA M on CPAP    Left foot pain    Malignant neoplasm of overlapping sites of right breast in female, estrogen receptor negative (CMS/HCC)    Other orders  -     traMADol (ULTRAM) 50 MG tablet; Take 1 tablet by mouth Every 8 (Eight) Hours As Needed for Moderate Pain .  -     gabapentin (NEURONTIN) 300 MG capsule; Take 1 capsule by mouth Daily.        Discussion    Patient presents today for a CPE.  Patient follows a healthy diet.   Patient follows an adequate exercise regimen. Mammogram is up to date.   Patient has been referred for colon cancer screening.   Pap smears are performed by the patient's gynecologist.   Immunizations are up to date. She will get an MMR titer and boost if needed if no contradindication with chemo. She will get a tdap booster today. She will continue tramadol and gabapentin in the evening for pain relief. Will d/c skelaxin unless she is having muscular spasm. Will refer for colonoscopy and dermatology evaluations. She has subclinical hypothyroidism and will test tsh, free t4, and thyroid antibodies next month.               Future Appointments   Date Time Provider Department Center   5/28/2019 12:00 PM LAB CHAIR 6 JUAN SMITH  [facility]   5/28/2019 12:20 PM Ubaldo Hancock Jr., MD [facility]  [facility] [place]   5/28/2019  1:15 PM INJECTION CHAIR JU[facility] CABRERA  [facility]   6/7/2019  2:15 PM Alber Orozco MD [facility] None

## 2019-05-09 ENCOUNTER — TELEPHONE (OUTPATIENT)
Dept: ONCOLOGY | Facility: HOSPITAL | Age: 59
End: 2019-05-09

## 2019-05-09 NOTE — TELEPHONE ENCOUNTER
----- Message from Jono Sheppard sent at 5/9/2019  2:37 PM EDT -----  Contact: [phone]  Scheduled for foot surgery 5/23 also on chemo. She's wanting to postpone the surgery until 7/15 so she can go on vacation. Dr Hancock has talked with the surgeon in the past.    Pt called stating she has been given the opportunity to go on vacation with her niece so she wants to postpone her foot surgery until July 15th. Dr. Diana (surgeon) wants Dr. Hancock's approval to postpone surgery. Pt also wanted to let Dr. Hancock know she will be on xeloda at time of surgery in case Dr. Hancock wants to adjust medication schedule. Message sent to Dr. Santi Hancock, Ubaldo YODER Jr., MD [place], [place] ALVAREZ, RN             She needs to work out timing with the surgeon. We can discuss timing of the chemo when I see her next. She should continue chemo for now according to her usual schedule.      Pt asked me to call Dr. Roman Diana to let him know. I called and left  for surgery scheduler.

## 2019-05-11 ENCOUNTER — RESULTS ENCOUNTER (OUTPATIENT)
Dept: INTERNAL MEDICINE | Facility: CLINIC | Age: 59
End: 2019-05-11

## 2019-05-11 DIAGNOSIS — Z23 IMMUNIZATION, VIRAL DISEASE: ICD-10-CM

## 2019-05-14 ENCOUNTER — DOCUMENTATION (OUTPATIENT)
Dept: ONCOLOGY | Facility: CLINIC | Age: 59
End: 2019-05-14

## 2019-05-28 ENCOUNTER — INFUSION (OUTPATIENT)
Dept: ONCOLOGY | Facility: HOSPITAL | Age: 59
End: 2019-05-28

## 2019-05-28 ENCOUNTER — OFFICE VISIT (OUTPATIENT)
Dept: ONCOLOGY | Facility: CLINIC | Age: 59
End: 2019-05-28

## 2019-05-28 ENCOUNTER — APPOINTMENT (OUTPATIENT)
Dept: ONCOLOGY | Facility: HOSPITAL | Age: 59
End: 2019-05-28

## 2019-05-28 ENCOUNTER — LAB (OUTPATIENT)
Dept: LAB | Facility: HOSPITAL | Age: 59
End: 2019-05-28

## 2019-05-28 VITALS
HEIGHT: 61 IN | OXYGEN SATURATION: 96 % | TEMPERATURE: 98.4 F | HEART RATE: 104 BPM | DIASTOLIC BLOOD PRESSURE: 97 MMHG | BODY MASS INDEX: 37.34 KG/M2 | SYSTOLIC BLOOD PRESSURE: 135 MMHG | RESPIRATION RATE: 20 BRPM | WEIGHT: 197.8 LBS

## 2019-05-28 DIAGNOSIS — C50.811 MALIGNANT NEOPLASM OF OVERLAPPING SITES OF RIGHT BREAST IN FEMALE, ESTROGEN RECEPTOR NEGATIVE (HCC): ICD-10-CM

## 2019-05-28 DIAGNOSIS — Z17.1 MALIGNANT NEOPLASM OF OVERLAPPING SITES OF RIGHT BREAST IN FEMALE, ESTROGEN RECEPTOR NEGATIVE (HCC): ICD-10-CM

## 2019-05-28 DIAGNOSIS — C50.811 MALIGNANT NEOPLASM OF OVERLAPPING SITES OF RIGHT BREAST IN FEMALE, ESTROGEN RECEPTOR NEGATIVE (HCC): Primary | ICD-10-CM

## 2019-05-28 DIAGNOSIS — Z17.1 MALIGNANT NEOPLASM OF OVERLAPPING SITES OF RIGHT BREAST IN FEMALE, ESTROGEN RECEPTOR NEGATIVE (HCC): Primary | ICD-10-CM

## 2019-05-28 LAB
ALBUMIN SERPL-MCNC: 4.1 G/DL (ref 3.5–5.2)
ALBUMIN/GLOB SERPL: 1.6 G/DL (ref 1.1–2.4)
ALP SERPL-CCNC: 66 U/L (ref 38–116)
ALT SERPL W P-5'-P-CCNC: 19 U/L (ref 0–33)
ANION GAP SERPL CALCULATED.3IONS-SCNC: 10.8 MMOL/L
AST SERPL-CCNC: 20 U/L (ref 0–32)
BASOPHILS # BLD AUTO: 0.06 10*3/MM3 (ref 0–0.2)
BASOPHILS NFR BLD AUTO: 0.8 % (ref 0–1.5)
BILIRUB SERPL-MCNC: 0.4 MG/DL (ref 0.2–1.2)
BUN BLD-MCNC: 18 MG/DL (ref 6–20)
BUN/CREAT SERPL: 18.2 (ref 7.3–30)
CALCIUM SPEC-SCNC: 8.8 MG/DL (ref 8.5–10.2)
CHLORIDE SERPL-SCNC: 104 MMOL/L (ref 98–107)
CO2 SERPL-SCNC: 27.2 MMOL/L (ref 22–29)
CREAT BLD-MCNC: 0.99 MG/DL (ref 0.6–1.1)
DEPRECATED RDW RBC AUTO: 55.4 FL (ref 37–54)
EOSINOPHIL # BLD AUTO: 0.15 10*3/MM3 (ref 0–0.4)
EOSINOPHIL NFR BLD AUTO: 2 % (ref 0.3–6.2)
ERYTHROCYTE [DISTWIDTH] IN BLOOD BY AUTOMATED COUNT: 15.1 % (ref 12.3–15.4)
GFR SERPL CREATININE-BSD FRML MDRD: 58 ML/MIN/1.73
GLOBULIN UR ELPH-MCNC: 2.5 GM/DL (ref 1.8–3.5)
GLUCOSE BLD-MCNC: 158 MG/DL (ref 74–124)
HCT VFR BLD AUTO: 38.7 % (ref 34–46.6)
HGB BLD-MCNC: 12.9 G/DL (ref 12–15.9)
IMM GRANULOCYTES # BLD AUTO: 0.02 10*3/MM3 (ref 0–0.05)
IMM GRANULOCYTES NFR BLD AUTO: 0.3 % (ref 0–0.5)
LYMPHOCYTES # BLD AUTO: 1.21 10*3/MM3 (ref 0.7–3.1)
LYMPHOCYTES NFR BLD AUTO: 16.5 % (ref 19.6–45.3)
MAGNESIUM SERPL-MCNC: 1.8 MG/DL (ref 1.8–2.5)
MCH RBC QN AUTO: 34 PG (ref 26.6–33)
MCHC RBC AUTO-ENTMCNC: 33.3 G/DL (ref 31.5–35.7)
MCV RBC AUTO: 102.1 FL (ref 79–97)
MONOCYTES # BLD AUTO: 0.64 10*3/MM3 (ref 0.1–0.9)
MONOCYTES NFR BLD AUTO: 8.7 % (ref 5–12)
NEUTROPHILS # BLD AUTO: 5.27 10*3/MM3 (ref 1.7–7)
NEUTROPHILS NFR BLD AUTO: 71.7 % (ref 42.7–76)
NRBC BLD AUTO-RTO: 0 /100 WBC (ref 0–0.2)
PHOSPHATE SERPL-MCNC: 3 MG/DL (ref 2.5–4.5)
PLATELET # BLD AUTO: 257 10*3/MM3 (ref 140–450)
PMV BLD AUTO: 10.2 FL (ref 6–12)
POTASSIUM BLD-SCNC: 4.6 MMOL/L (ref 3.5–4.7)
PROT SERPL-MCNC: 6.6 G/DL (ref 6.3–8)
RBC # BLD AUTO: 3.79 10*6/MM3 (ref 3.77–5.28)
SODIUM BLD-SCNC: 142 MMOL/L (ref 134–145)
WBC NRBC COR # BLD: 7.35 10*3/MM3 (ref 3.4–10.8)

## 2019-05-28 PROCEDURE — 83735 ASSAY OF MAGNESIUM: CPT | Performed by: INTERNAL MEDICINE

## 2019-05-28 PROCEDURE — 85025 COMPLETE CBC W/AUTO DIFF WBC: CPT | Performed by: INTERNAL MEDICINE

## 2019-05-28 PROCEDURE — 36415 COLL VENOUS BLD VENIPUNCTURE: CPT | Performed by: INTERNAL MEDICINE

## 2019-05-28 PROCEDURE — 99214 OFFICE O/P EST MOD 30 MIN: CPT | Performed by: INTERNAL MEDICINE

## 2019-05-28 PROCEDURE — 96372 THER/PROPH/DIAG INJ SC/IM: CPT | Performed by: INTERNAL MEDICINE

## 2019-05-28 PROCEDURE — 80053 COMPREHEN METABOLIC PANEL: CPT | Performed by: INTERNAL MEDICINE

## 2019-05-28 PROCEDURE — 25010000002 DENOSUMAB 120 MG/1.7ML SOLUTION: Performed by: INTERNAL MEDICINE

## 2019-05-28 PROCEDURE — 84100 ASSAY OF PHOSPHORUS: CPT | Performed by: INTERNAL MEDICINE

## 2019-05-28 RX ORDER — DIPHENOXYLATE HYDROCHLORIDE AND ATROPINE SULFATE 2.5; .025 MG/1; MG/1
1 TABLET ORAL 4 TIMES DAILY PRN
Qty: 60 TABLET | Refills: 2 | Status: SHIPPED | OUTPATIENT
Start: 2019-05-28 | End: 2020-01-13 | Stop reason: SDUPTHER

## 2019-05-28 RX ADMIN — DENOSUMAB 120 MG: 120 INJECTION SUBCUTANEOUS at 13:45

## 2019-05-29 LAB
T4 FREE SERPL-MCNC: 1.25 NG/DL (ref 0.82–1.77)
THYROGLOB AB SERPL-ACNC: <1 IU/ML (ref 0–0.9)
THYROPEROXIDASE AB SERPL-ACNC: <6 IU/ML (ref 0–34)
TSH SERPL-ACNC: 2.16 UIU/ML (ref 0.45–4.5)

## 2019-05-29 NOTE — PROGRESS NOTES
Subjective .     REASONS FOR FOLLOWUP:    1. Stage Ib ([id number]) right breast cancer: Diagnosed May 2010 with excisional biopsy for invasive ductal carcinoma, 1.3 cm, grade 2, ER 90%, SC 80%, HER-2/peter negative (1+ IHC).  Subsequent right mastectomy in July 2010 with no residual breast malignancy, 1/5 sentinel lymph node with micrometastasis (0.25 mm).  Treated in the [facility] system with adjuvant AC ×4 cycles in 2010 (no taxanes administered due to underlying Charcot-Saloni-Tooth with peripheral neuropathy).  Adjuvant Femara (postmenopausal) initiated October 2010 with plan to treat ×10 years.  Genetic testing reportedly negative.  Developed osteopenia treated with Prolia beginning 2/27/13.  2. Recurrent/metastatic disease identified 10/8/17 involving thoracic spine with cord compression at T6, lumbosacral involvement, sternal and right sternoclavicular involvement.  Femara discontinued.  Radiation administered (in the [facility] system) to the thoracic spine beginning 10/19/17 treating T3-T9 to a dose of 24 gray in 6 fractions.  3. Evaluation with MRI 12/8/17 showing persistent T6 cord compression with persistent neurologic compromise requiring surgical treatment 12/11/17 with T6 laminectomy/corpectomy and T3-T9 fusion.  Pathology with metastatic carcinoma of breast origin, ER negative, SC negative, HER-2/peter negative (1+ IHC).  4. Right pulmonary embolism 10/21/17 treated with Lovenox 1 mg/kg (100 mg) every 12 hours.  No evidence of DVT.  Lovenox held due to right gluteus hematoma 3/23/18 through 4/6/18.  Transitioned to oral Eliquis 5mg bid 1/28/19 (as of 2/25/19, patient still using previous supply of Lovenox).  5. Cancer related pain previously on Duragesic 50 µg patch every 72 hours and Dilaudid 4 mg as needed for breakthrough pain.  Narcotics subsequently discontinued with improvement in pain in January 2018.  6. Radiation therapy to L3 dural metastasis and left humerus initiated 1/15/18 (10 fractions),  completed 1/26/18  7. Monthly Xgeva initiated 1/23/18  8. Mild hypercalcemia of malignancy  9. Initiation of palliative oral single agent Xeloda 2/7/18 (2000 mg a.m., 1500 mg p.m. for 14/21 days).  10. 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  In reviewing prior CT scans, has been present for some time.  Monitor for now, if it enlarges consider surgical excision.  11. Cumulative side effects from Xeloda with fatigue, anorexia, diarrhea, increased tearing.  Alteration in dose/schedule with cycle 11 to 1500 mg twice a day for 7 days on followed by 7 days off.    HISTORY OF PRESENT ILLNESS:  The patient is a 58 y.o. year old female who is here for follow-up with the above-mentioned history.    History of Present Illness the patient returns today in follow-up due to begin cycle 20 Xeloda and also continuing on monthly Xgeva that is due today with laboratory studies to review.  The patient was originally scheduled to undergo surgery on her right fifth metatarsal with Dr. Roman Diana on 5/23/2019.  The patient however has an upcoming opportunity to travel to [place] to visit family members and has elected to delay the surgery until 7/15/2019.  She reports that Dr. Diana did not have any objections to this.  In my conversation with Dr. Diana, there was concern that if the area was left untreated surgically there could be risk of skin ulceration.  Patient reports that there is no skin abnormality in the area.  In the interval since her last visit, she has continued to do well.  Hand-foot symptoms are mild and she continues to use frequent emollient cream.  She does have some intermittent diarrhea which is relieved with Lomotil and she is requesting a refill today.  She denies any mucositis.  She reports mild fatigue.  She has no other complaints today.   She is ambulating well with use of a cane.    Past Medical History:   Diagnosis Date   • Acute pulmonary embolism, cancer-related 10/2017   • Allergic rhinitis    • Arthritis     Right knee; left shoulder   • Cancer (CMS/[facility]) 2010    Right breast   • Cancer (CMS/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   • Charcot-Saloni-Tooth disease    • CPAP (continuous positive airway pressure) dependence    • GERD (gastroesophageal reflux disease)    • H/O Colon polyps    • H/O Uterine fibroid    • Heart murmur    • Hyperlipidemia    • Hypertension    • PONV (postoperative nausea and vomiting)      Past Surgical History:   Procedure Laterality Date   • BLADDER SURGERY      Bladder lift   • BREAST RECONSTRUCTION, BREAST TISSUE EXPANDER INSERTION Right 2010   • BREAST SURGERY Right 2010    Mastectomy   •  SECTION  ,    • CHOLECYSTECTOMY     • COLONOSCOPY     • HERNIA REPAIR  2015    [facility]   • HYSTERECTOMY      Partial   • KNEE SURGERY Right    • LEG SURGERY Left     Broken   • MASTECTOMY Right    • WY TREAT TIBIAL SHAFT FX, INTRAMED IMPLANT Left 2017    Procedure: TIBIA INTRAMEDULLARY NAIL/DON INSERTION;  Surgeon: Romero Shanks MD;  Location: [facility];  Service: Orthopedics   • THORACIC DECOMPRESSION POSTERIOR FUSION WITH INSTRUMENTATION N/A 2017    Procedure: T6 costotransversectomy and decompression with T3 to  T9 posterior spinal fusion with instrumentation with Jennifer Gonzalez and Nael Wilkes;  Surgeon: Quan Nayak MD;  Location: [facility];  Service:        ONCOLOGIC HISTORY:  (History from previous dates can be found in the separate document.)    Current Outpatient Medications on File Prior to Visit   Medication Sig Dispense Refill   • acetaminophen (TYLENOL) 500 MG tablet Take 500 mg by mouth Every 6 (Six) Hours As Needed for Mild Pain .     • apixaban (ELIQUIS) 5 MG tablet tablet Take 1 tablet by mouth Every 12 (Twelve) Hours. 60 tablet 5   •  calcium carbonate (OS-CARY) 600 MG tablet Take 600 mg by mouth 2 (Two) Times a Day With Meals.     • capecitabine (XELODA) 500 MG chemo tablet Take 3 tabs (1500 mg) po twice a day for 7 days on then 7 days off. 84 tablet 3   • cholecalciferol (VITAMIN D3) 1000 units tablet Take 1,000 Units by mouth Daily.     • diazePAM (VALIUM) 10 MG tablet Take 1 tablet by mouth Every 12 (Twelve) Hours As Needed for Anxiety. 10 tablet 1   • FLONASE ALLERGY RELIEF 50 MCG/ACT nasal spray 2 sprays into each nostril daily.  11   • mesalamine (LIALDA) 1.2 g EC tablet TAKE 1 TABLET BY MOUTH TWICE DAILY 180 tablet 0   • omeprazole (PriLOSEC) 40 MG capsule TAKE 1 CAPSULE DAILY 90 capsule 2   • ondansetron ODT (ZOFRAN-ODT) 8 MG disintegrating tablet Take 1 tablet by mouth Every 8 (Eight) Hours As Needed for Nausea or Vomiting. 30 tablet 1   • phenazopyridine (PYRIDIUM) 200 MG tablet Take 200 mg by mouth 3 (Three) Times a Day As Needed for bladder spasms.     • prochlorperazine (COMPAZINE) 10 MG tablet Take 1 tablet by mouth Every 6 (Six) Hours As Needed for Nausea or Vomiting. 30 tablet 0   • sennosides-docusate sodium (SENOKOT-S) 8.6-50 MG tablet Take 2 tablets by mouth 2 (Two) Times a Day. 90 tablet 2   • temazepam (RESTORIL) 15 MG capsule Take 1 capsule by mouth At Night As Needed for Sleep. 30 capsule 1   • traMADol (ULTRAM) 50 MG tablet Take 1 tablet by mouth Every 8 (Eight) Hours As Needed for Moderate Pain . 90 tablet 4   • trimethoprim (TRIMPEX) 100 MG tablet Take 100 mg by mouth Daily.  2   • Tuberculin PPD (APLISOL ID) Inject  into the skin.     • Unable to find SWISH AND SPIT 5 ML PO Q 2 H PRN  0   • [DISCONTINUED] diphenoxylate-atropine (LOMOTIL) 2.5-0.025 MG per tablet Take 1 tablet by mouth 4 (Four) Times a Day As Needed for Diarrhea. 60 tablet 2   • apixaban (ELIQUIS) 5 MG tablet tablet Take  by mouth 2 (Two) Times a Day.     • gabapentin (NEURONTIN) 300 MG capsule Take 1 capsule by mouth Daily. 90 capsule 3     No current  facility-administered medications on file prior to visit.        ALLERGIES:     Allergies   Allergen Reactions   • Hydrocodone Nausea Only   • Morphine And Related Nausea And Vomiting     Social History     Socioeconomic History   • Marital status:      Spouse name: Murray   • Number of children: 3   • Years of education: College   • Highest education level: Not on file   Occupational History     Employer: [employer]     Employer: RETIRED   Tobacco Use   • Smoking status: Never Smoker   • Smokeless tobacco: Never Used   Substance and Sexual Activity   • Alcohol use: Yes     Comment: social   • Drug use: No   • Sexual activity: Defer     Family History   Problem Relation Age of Onset   • Heart disease Mother    • Hyperlipidemia Mother    • Hypertension Mother    • Diabetes Father    • Charcot-Saloni-Tooth disease Father    • Heart disease Father    • Hypertension Father    • Heart failure Father    • Diabetes Sister    • Heart disease Sister    • Hypertension Sister    • Heart disease Maternal Aunt    • Scoliosis Maternal Aunt    • Heart disease Maternal Uncle    • Diabetes Maternal Grandmother    • Heart disease Maternal Grandmother    • Hypertension Maternal Grandmother    • Uterine cancer Maternal Grandmother    • Heart disease Maternal Grandfather      Review of Systems   Constitutional: Positive for fatigue. Negative for activity change, appetite change, fever and unexpected weight change.   HENT: Negative for congestion, mouth sores, nosebleeds, sore throat and voice change.    Eyes: Negative for discharge.   Respiratory: Negative for cough, shortness of breath and wheezing.    Cardiovascular: Negative for chest pain, palpitations and leg swelling.   Gastrointestinal: Negative for abdominal distention, abdominal pain, blood in stool, constipation, diarrhea, nausea and vomiting.   Endocrine: Negative for cold intolerance and heat intolerance.   Genitourinary: Negative for difficulty urinating, dysuria,  frequency and hematuria.   Musculoskeletal: Positive for arthralgias. Negative for back pain, joint swelling and myalgias.   Skin: Positive for rash. Negative for wound.   Neurological: Negative for dizziness, syncope, weakness, light-headedness, numbness and headaches.   Hematological: Negative for adenopathy. Does not bruise/bleed easily.   Psychiatric/Behavioral: Positive for sleep disturbance. Negative for confusion. The patient is not nervous/anxious.          Objective      Vitals:    05/28/19 1237   BP: 135/97   Pulse: 104   Resp: 20   Temp: 98.4 °F (36.9 °C)   SpO2: 96%      Current Status 5/28/2019   ECOG score 1   Pain 0/10    Physical Exam   Constitutional: She is oriented to person, place, and time. She appears well-developed and well-nourished.   HENT:   Mouth/Throat: Oropharynx is clear and moist.   Eyes: Conjunctivae are normal.   Neck: No thyromegaly present.   Cardiovascular: Normal rate and regular rhythm. Exam reveals no gallop and no friction rub.   No murmur heard.  Pulmonary/Chest: Breath sounds normal. No respiratory distress.   Abdominal: Soft. Bowel sounds are normal. She exhibits no distension. There is no tenderness.   Musculoskeletal: She exhibits edema.   Lower extremity braces in place.  Trace bilateral lower extremity edema.   Lymphadenopathy:        Head (right side): No submandibular adenopathy present.     She has no cervical adenopathy.     She has no axillary adenopathy.        Right: No inguinal and no supraclavicular adenopathy present.        Left: No inguinal and no supraclavicular adenopathy present.   Neurological: She is alert and oriented to person, place, and time. No cranial nerve deficit.   Bilateral lower extremity strength, 4+/5   Skin: Skin is warm and dry. No rash noted.   Mild erythema involving the palms of the hands.  Mild degree of skin cracking with minor peeling   Psychiatric: She has a normal mood and affect. Her behavior is normal.       RECENT  LABS:  Hematology WBC   Date Value Ref Range Status   05/28/2019 7.35 3.40 - 10.80 10*3/mm3 Final     RBC   Date Value Ref Range Status   05/28/2019 3.79 3.77 - 5.28 10*6/mm3 Final     Hemoglobin   Date Value Ref Range Status   05/28/2019 12.9 12.0 - 15.9 g/dL Final     Hematocrit   Date Value Ref Range Status   05/28/2019 38.7 34.0 - 46.6 % Final     Platelets   Date Value Ref Range Status   05/28/2019 257 140 - 450 10*3/mm3 Final        Lab Results   Component Value Date    GLUCOSE 158 (H) 05/28/2019    BUN 18 05/28/2019    CREATININE 0.99 05/28/2019    EGFRIFNONA 58 (L) 05/28/2019    EGFRIFAFRI 80 09/25/2017    BCR 18.2 05/28/2019    K 4.6 05/28/2019    CO2 27.2 05/28/2019    CALCIUM 8.8 05/28/2019    PROTENTOTREF 6.4 09/25/2017    ALBUMIN 4.10 05/28/2019    LABIL2 2.2 09/25/2017    AST 20 05/28/2019    ALT 19 05/28/2019         Assessment/Plan      1. Previous Stage Ib ([id number]) right breast cancer:  · Diagnosed May 2010 with excisional biopsy for invasive ductal carcinoma, 1.3 cm, grade 2, ER 90%, MA 80%, HER-2/peter negative (1+ IHC).    · Subsequent right mastectomy in July 2010 with no residual breast malignancy, 1/5 sentinel lymph node with micrometastasis (0.25 mm).    · Treated in the [facility] system with adjuvant AC ×4 cycles in 2010 (no taxanes administered due to underlying Charcot-Saloni-Tooth with peripheral neuropathy).    · Adjuvant Femara (postmenopausal) initiated October 2010 with plan to treat ×10 years.    · Genetic testing reportedly negative.    · Developed osteopenia treated with Prolia beginning 2/27/13. Subsequently discontinued due to identification of metastatic disease.  2. Recurrent/metastatic disease identified 10/8/17:  · Disease involving thoracic spine with cord compression at T6, lumbosacral involvement, sternal and right sternoclavicular involvement.    · Femara discontinued in 10/2017.    · Radiation administered (in the [facility] system) to the thoracic spine beginning 10/19/17  treating T3-T9 to a dose of 24 gray in 6 fractions.  · Evaluation with MRI 12/8/17 showing persistent T6 cord compression with persistent neurologic compromise requiring surgical treatment 12/11/17 with T6 laminectomy/corpectomy and T3-T9 fusion.  Pathology with metastatic carcinoma of breast origin, ER negative, AZ negative, HER-2/peter negative (1+ IHC).  · Additional staging evaluation 12/8/17 with no evidence of visceral metastatic disease, bone scan showing involvement of thoracic spine, sternum, left humerus, mid frontal bone.  No plane film correlate of left humerus lesion.  MRI lumbar spine with small intradural L3 metastasis.  CA 15-3 12/6/17- 17.  · Palliative radiation therapy to L3 dural metastasis and left humerus initiated 1/15/18 (10 fractions), completed 1/26/18.  · Hypercalcemia of malignancy with calcium in the 10-11 range.  · Initiation of monthly Xgeva 1/23/18.  · Baseline CT scan 1/30/18 with no evidence of visceral involvement.  Cluster of nodular opacities in the right lower lobe suspected to be infectious or related to bronchiolitis. Bone scan 1/30/18 showed postsurgical change in the thoracic spine, stable uptake in the frontal bone, no new areas of disease.  · Initiation of palliative oral single agent Xeloda 2/7/18 2000 mg a.m., 1500 mg p.m. for 14/21 days.   · Following 3 cycles xeloda, bone scan 4/4/18 showed no change from the prior study.  CT scan 4/4/18 showed a small pericardial effusion of unclear significance as well as a subcutaneous nodule in the right lateral chest wall.  Subsequent evaluation with echocardiogram 4/17/18 showed no evidence of pericardial effusion.  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  We discussed the possibility of surgical excision of the right subcutaneous chest wall lesion for more definitive diagnosis.   Reviewed previous CT images dating back to 12/8/17 and the lesion was present even at that time measuring around 1.7 cm although not commented on in the radiology report.  As this appears to be an indolent low-grade process unrelated to her breast cancer, recommendee foregoing surgical excision at this time and monitoring this area on future scans.  The patient agreed.    · Following 6 cycles of Xeloda, CT 6/6/18  showed stable findings, no evidence of progressive disease.  There was a comment regarding subcutaneous abnormality in the anterior abdominal wall and this was related to Lovenox injection sites.  Bone scan 6/6/18 showed no interval change.   · CT scan and bone scan 8/13/18 following 9 cycles of Xeloda showed stable findings with no evidence of significant visceral metastases.  Her bone lesions appear stable on bone scan.  The spindle cell neoplasm in her right chest wall actually decreased in size from 2 cm down to 1.6 cm.    · The patient experienced some symptoms of diarrhea, anorexia, generalized weakness during cycle 9 Xeloda it was unclear whether this was related to a viral gastroenteritis or toxicity from treatment.  Symptoms recurred during cycle 10 and treatment was cut short by 2 days.  Symptoms attributed to Xeloda.  With cycle 11, dose and schedule altered to 1500 mg twice daily for 7 days on followed by 7 days off .  · CT scan and bone scan from 10/31/18 following 12 cycles of Xeloda showed stable disease.    · CT scans and bone scan 1/21/19 following 15 cycles of Xeloda showed stable findings.  · CT scan and bone scan following 18 cycles of Xeloda 4/24/2019 showed stable findings.    · Patient returns today to begin cycle 20 oral Xeloda and to continue on monthly Xgeva.  She is tolerating treatment reasonably well with hand-foot symptoms that are mild.  She has mild fatigue and some manageable diarrhea.  We will proceed on with treatment.  She  has delayed her right foot surgery until  7/15/2019.  I have asked her to hold Xeloda beginning 7/8/2019 and I will be seeing her shortly after her surgery with scans to review in 2 more months (3-month interval) and we will discuss timing to reinitiate Xeloda postoperatively at that visit.  In 1 month she will return for nurse practitioner visit.  3. Right pulmonary embolism:  · Diagnosed on CT angiogram 10/21/17 involving small right lower lobe pulmonary artery.  Lower extremity Dopplers negative.  · Bilateral lower extremity Dopplers negative again 12/5/17.  · Received chronic Lovenox 1 mg/kg twice per day, transition to oral Eliquis in February 2019, continuing on Eliquis 5 mg twice daily.  · Patient will hold Eliquis 2 days prior to her upcoming surgical procedure on 7/15/2019 and will resume Eliquis the day after the procedure.  4. Cancer related pain:  · Previously receiving Duragesic 50 µg patch every 72 hours along with Dilaudid 4 mg as needed for breakthrough pain  · The patient's pain improved over time and she was able to discontinue both Duragesic and Dilaudid in the interval.  · Patient does take occasional Flexeril at bedtime due to back spasm/pain when she has been more active.  · The patient does have some occasional aggravation of her chronic back pain with significant rehabilitation activity and requires an occasional dose of Dilaudid.  No narcotic requirements recently.  5. Chemotherapy-induced diarrhea with subsequent C. difficile colitis:  · The patient developed initial diarrhea related to Xeloda at regular dosing.  · Symptoms improved on reduced dose Xeloda  · Flare of symptoms in October 2018 with apparent finding of C. difficile colitis by GI, treated with course of oral vancomycin with improvement in symptoms.  · Patient notes minimal intermittent diarrhea on Xeloda requiring occasional dosing of Imodium.  6. Traumatic left tibia/fibular fracture:  · Status post ORIF 12/6/17  · Specimen was sent for pathologic review, negative for  evidence of malignancy  7. Hypercalcemia:  · Suspect hypercalcemia of malignancy, calcium in  10-11 range previously.  · Calcium normalized following initiation of monthly Xgeva on 1/23/18.  8. Chemotherapy-induced mucositis:  · Patient had a minimal degree of mucositis with cycle 2.  The patient has magic mouthwash to use as needed.  No subsequent mucositis.  9. Recurrent UTI, bladder wall thickening on CT:  · Patient had an enterococcal UTI on 3/2/18 sensitive to nitrofurantoin and received treatment, unclear how long.  · Recurrent UTI 3/20/18 with urine culture growing Klebsiella, initially treated with nitrofurantoin, transitioned to Levaquin.  · CT 4/4/18 with diffuse bladder wall thickening with increased nodular thickening at the left base.  Referral to urogynecology Dr. May Johnson.  She was placed on a prophylactic dose of trimethoprim 100 mg daily, bladder wall thickening felt to be related to recent recurrent urinary tract infections.  · Patient with urinary symptoms, treated with course of Macrobid at the end of December 2018, urine culture however was negative 12/31/18.  10.   Mobility:  · The patient underwent an intensive course of rehabilitation at [facility].  She graduated from her outpatient course November 2018.  · Overall the patient has improved dramatically in terms of mobility, now walking around 1 mile per day without assistance.  11.  Depression:  · The patient weaned herself off of Cymbalta and reports that her symptoms remain stable.  12. 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  Symptoms did improve and she continued on the same dose.    · Interestingly, symptoms are very manageable and mild currently with only slight erythema and some skin cracking in the hands.  13. Elevated liver function studies:  · Liver  function studies increased 8/20/19 with ALT 98, AST 70, normal total bilirubin.  · Negative viral hepatitis A, B, and C panel 8/23/18  · Likely related to hepatic steatosis seen on CT, no definitive evidence of metastatic liver lesions.   · Subsequent improvement in LFTs  · Today, LFTs are normal  14. Chemotherapy induced leukopenia:  · Patient with mild leukopenia secondary to Xeloda, WBC 7.35, ANC 5.27  15. GERD:  · Prilosec 40 mg twice daily.  16. Insomnia:  · Patient with prior paradoxical reaction to Benadryl  · Improved on temazepam 15 mg nightly as needed.    Plan:  1. Begin cycle 20 Xeloda continuing with 1500 mg twice a day 7 days on followed by 7 days off  2. Monthly Xgeva today  3. Continue Eliquis 5 mg twice daily.  4. Continue use of emollient cream on the hands and feet and continue to wear socks and gloves at night  5. In 4 weeks nurse practitioner visit with CBC, CMP, magnesium, phosphorus.  The patient will be due for Xgeva and to begin next cycle of Xeloda.  6. On 7/15/2019 the patient will undergo surgery on right fifth metatarsal head with Dr. Roman Diana.  Hold Eliquis 2 days prior to procedure and resume day after.  Patient will hold Xeloda beginning 7/8/2019 (1 week prior to procedure) and will remain off Xeloda until MD visit.  7. In 7 weeks CT chest abdomen pelvis and bone scan  8. In 8 weeks MD visit with CBC, CMP, magnesium, phosphorus.  Patient will be due for Xgeva and to begin next cycle Xeloda pending postoperative status.

## 2019-05-30 ENCOUNTER — DOCUMENTATION (OUTPATIENT)
Dept: PHYSICAL THERAPY | Facility: HOSPITAL | Age: 59
End: 2019-05-30

## 2019-05-30 DIAGNOSIS — Z74.09 IMPAIRED MOBILITY AND ENDURANCE: Primary | ICD-10-CM

## 2019-05-30 DIAGNOSIS — R53.1 GENERALIZED WEAKNESS: ICD-10-CM

## 2019-05-30 LAB
MEV IGG SER IA-ACNC: 163 AU/ML
MUV IGG SER IA-ACNC: 18.7 AU/ML
RUBV IGG SERPL IA-ACNC: 5.24 INDEX

## 2019-05-30 NOTE — THERAPY DISCHARGE NOTE
Outpatient Physical Therapy Discharge Summary         Patient Name: Martha Davey  : 1960  MRN: [id number]    Today's Date: 2019    Visit Dx:    ICD-10-CM ICD-9-CM   1. Impaired mobility and endurance Z74.09 V49.89   2. Generalized weakness R53.1 780.79       PT OP Goals     Row Name 19 0800          PT Short Term Goals    STG 1  Patient will report reduction in pain for 12-24 hours post aquatic therapy  -     STG 1 Progress  Not Met  -     STG 2  Patient will perform sit to stand from pool bench without use of hands to increase functional strength  -     STG 2 Progress  Not Met  -     STG 3  Patient will increase L shoulder flexion AROM to 120 degrees or greater to increase ease with overhead tasks  -     STG 3 Progress  Not Met  -        Long Term Goals    LTG 1  Patient will perform sit to stand from chair on land without use of hands to increase functional strength  -     LTG 1 Progress  Partially Met  -     LTG 2  Patient will increase L shoulder flex strength to 4/5 with MMT to increase ease with ADLs  -     LTG 2 Progress  Not Met  -     LTG 3  Patient will demonstrate independence with advanced aquatic HEP to facilitate independent management of condition  -     LTG 3 Progress  Not Met  -     LTG 4  Patient will improve score on LEFS by 15% or greater to improve quality of life (80% disability at eval)  -     LTG 4 Progress  Not Met  -       User Key  (r) = Recorded By, (t) = Taken By, (c) = Cosigned By    Initials Name Provider Type    Ami Colon, VANNA Physical Therapist          OP PT Discharge Summary  Date of Discharge: 19  Reason for Discharge: Independent  Outcomes Achieved: Unable to make functional progress toward goals at this time  Discharge Destination: Home without follow-up      Time Calculation:                    Ami Graham PT  2019

## 2019-06-07 ENCOUNTER — APPOINTMENT (OUTPATIENT)
Dept: SLEEP MEDICINE | Facility: HOSPITAL | Age: 59
End: 2019-06-07

## 2019-06-10 RX ORDER — DIPHENOXYLATE HYDROCHLORIDE AND ATROPINE SULFATE 2.5; .025 MG/1; MG/1
TABLET ORAL
Qty: 60 TABLET | Refills: 0 | OUTPATIENT
Start: 2019-06-10

## 2019-06-11 ENCOUNTER — TELEPHONE (OUTPATIENT)
Dept: ONCOLOGY | Facility: HOSPITAL | Age: 59
End: 2019-06-11

## 2019-06-11 ENCOUNTER — DOCUMENTATION (OUTPATIENT)
Dept: ONCOLOGY | Facility: CLINIC | Age: 59
End: 2019-06-11

## 2019-06-11 NOTE — TELEPHONE ENCOUNTER
----- Message from Argentina Ortiz sent at 6/11/2019  9:41 AM EDT -----  Contact: [phone]  Pt has a wound on her toe and wants to know if she should start back on her chemo pills

## 2019-06-11 NOTE — TELEPHONE ENCOUNTER
----- Message from Dali Smith sent at 6/11/2019 11:22 AM EDT -----  Contact: [phone]  Return call from today.

## 2019-06-11 NOTE — TELEPHONE ENCOUNTER
Pt calling asking if she should postpone starting her Xeloda another week to allow an open blister on her foot to heal.  Discussed with Dr. Hancock and OK received for pt to hold Xeloda one more week to allow blister to heal.  Pt called and informed of same.  Pt v/u.

## 2019-06-12 RX ORDER — DIPHENOXYLATE HYDROCHLORIDE AND ATROPINE SULFATE 2.5; .025 MG/1; MG/1
TABLET ORAL
Qty: 60 TABLET | Refills: 0 | OUTPATIENT
Start: 2019-06-12

## 2019-06-25 ENCOUNTER — OFFICE VISIT (OUTPATIENT)
Dept: ONCOLOGY | Facility: CLINIC | Age: 59
End: 2019-06-25

## 2019-06-25 ENCOUNTER — LAB (OUTPATIENT)
Dept: OTHER | Facility: HOSPITAL | Age: 59
End: 2019-06-25

## 2019-06-25 ENCOUNTER — INFUSION (OUTPATIENT)
Dept: ONCOLOGY | Facility: HOSPITAL | Age: 59
End: 2019-06-25

## 2019-06-25 VITALS
WEIGHT: 196.9 LBS | SYSTOLIC BLOOD PRESSURE: 126 MMHG | RESPIRATION RATE: 20 BRPM | OXYGEN SATURATION: 96 % | DIASTOLIC BLOOD PRESSURE: 79 MMHG | HEIGHT: 61 IN | TEMPERATURE: 98.5 F | BODY MASS INDEX: 37.17 KG/M2 | HEART RATE: 100 BPM

## 2019-06-25 DIAGNOSIS — C50.811 MALIGNANT NEOPLASM OF OVERLAPPING SITES OF RIGHT BREAST IN FEMALE, ESTROGEN RECEPTOR NEGATIVE (HCC): Primary | ICD-10-CM

## 2019-06-25 DIAGNOSIS — Z17.1 MALIGNANT NEOPLASM OF OVERLAPPING SITES OF RIGHT BREAST IN FEMALE, ESTROGEN RECEPTOR NEGATIVE (HCC): ICD-10-CM

## 2019-06-25 DIAGNOSIS — C50.811 MALIGNANT NEOPLASM OF OVERLAPPING SITES OF RIGHT BREAST IN FEMALE, ESTROGEN RECEPTOR NEGATIVE (HCC): ICD-10-CM

## 2019-06-25 DIAGNOSIS — Z17.1 MALIGNANT NEOPLASM OF OVERLAPPING SITES OF RIGHT BREAST IN FEMALE, ESTROGEN RECEPTOR NEGATIVE (HCC): Primary | ICD-10-CM

## 2019-06-25 LAB
ALBUMIN SERPL-MCNC: 4.1 G/DL (ref 3.5–5.2)
ALBUMIN/GLOB SERPL: 1.6 G/DL
ALP SERPL-CCNC: 66 U/L (ref 39–117)
ALT SERPL W P-5'-P-CCNC: 17 U/L (ref 1–33)
ANION GAP SERPL CALCULATED.3IONS-SCNC: 12.6 MMOL/L
AST SERPL-CCNC: 19 U/L (ref 1–32)
BASOPHILS # BLD AUTO: 0.04 10*3/MM3 (ref 0–0.2)
BASOPHILS NFR BLD AUTO: 1 % (ref 0–1.5)
BILIRUB SERPL-MCNC: 0.4 MG/DL (ref 0.1–1.2)
BUN BLD-MCNC: 19 MG/DL (ref 6–20)
BUN/CREAT SERPL: 20.4 (ref 7–25)
CALCIUM SPEC-SCNC: 9.7 MG/DL (ref 8.6–10.5)
CHLORIDE SERPL-SCNC: 103 MMOL/L (ref 98–107)
CO2 SERPL-SCNC: 26.4 MMOL/L (ref 22–29)
CREAT BLD-MCNC: 0.93 MG/DL (ref 0.57–1)
DEPRECATED RDW RBC AUTO: 52.1 FL (ref 37–54)
EOSINOPHIL # BLD AUTO: 0.18 10*3/MM3 (ref 0–0.4)
EOSINOPHIL NFR BLD AUTO: 4.3 % (ref 0.3–6.2)
ERYTHROCYTE [DISTWIDTH] IN BLOOD BY AUTOMATED COUNT: 14.2 % (ref 12.3–15.4)
GFR SERPL CREATININE-BSD FRML MDRD: 62 ML/MIN/1.73
GLOBULIN UR ELPH-MCNC: 2.6 GM/DL
GLUCOSE BLD-MCNC: 128 MG/DL (ref 65–99)
HCT VFR BLD AUTO: 40.4 % (ref 34–46.6)
HGB BLD-MCNC: 13.3 G/DL (ref 12–15.9)
IMM GRANULOCYTES # BLD AUTO: 0.01 10*3/MM3 (ref 0–0.05)
IMM GRANULOCYTES NFR BLD AUTO: 0.2 % (ref 0–0.5)
LYMPHOCYTES # BLD AUTO: 1 10*3/MM3 (ref 0.7–3.1)
LYMPHOCYTES NFR BLD AUTO: 23.9 % (ref 19.6–45.3)
MAGNESIUM SERPL-MCNC: 2 MG/DL (ref 1.6–2.6)
MCH RBC QN AUTO: 32.6 PG (ref 26.6–33)
MCHC RBC AUTO-ENTMCNC: 32.9 G/DL (ref 31.5–35.7)
MCV RBC AUTO: 99 FL (ref 79–97)
MONOCYTES # BLD AUTO: 0.35 10*3/MM3 (ref 0.1–0.9)
MONOCYTES NFR BLD AUTO: 8.4 % (ref 5–12)
NEUTROPHILS # BLD AUTO: 2.6 10*3/MM3 (ref 1.7–7)
NEUTROPHILS NFR BLD AUTO: 62.2 % (ref 42.7–76)
NRBC BLD AUTO-RTO: 0 /100 WBC (ref 0–0.2)
PHOSPHATE SERPL-MCNC: 4.2 MG/DL (ref 2.5–4.5)
PLATELET # BLD AUTO: 232 10*3/MM3 (ref 140–450)
PMV BLD AUTO: 10.4 FL (ref 6–12)
POTASSIUM BLD-SCNC: 4.5 MMOL/L (ref 3.5–5.2)
PROT SERPL-MCNC: 6.7 G/DL (ref 6–8.5)
RBC # BLD AUTO: 4.08 10*6/MM3 (ref 3.77–5.28)
SODIUM BLD-SCNC: 142 MMOL/L (ref 136–145)
WBC NRBC COR # BLD: 4.18 10*3/MM3 (ref 3.4–10.8)

## 2019-06-25 PROCEDURE — 84100 ASSAY OF PHOSPHORUS: CPT | Performed by: INTERNAL MEDICINE

## 2019-06-25 PROCEDURE — 80053 COMPREHEN METABOLIC PANEL: CPT | Performed by: INTERNAL MEDICINE

## 2019-06-25 PROCEDURE — 85025 COMPLETE CBC W/AUTO DIFF WBC: CPT | Performed by: INTERNAL MEDICINE

## 2019-06-25 PROCEDURE — 99214 OFFICE O/P EST MOD 30 MIN: CPT | Performed by: NURSE PRACTITIONER

## 2019-06-25 PROCEDURE — 83735 ASSAY OF MAGNESIUM: CPT | Performed by: INTERNAL MEDICINE

## 2019-06-25 PROCEDURE — 36415 COLL VENOUS BLD VENIPUNCTURE: CPT

## 2019-06-25 PROCEDURE — 96372 THER/PROPH/DIAG INJ SC/IM: CPT | Performed by: NURSE PRACTITIONER

## 2019-06-25 PROCEDURE — 25010000002 DENOSUMAB 120 MG/1.7ML SOLUTION: Performed by: NURSE PRACTITIONER

## 2019-06-25 RX ORDER — TEMAZEPAM 15 MG/1
CAPSULE ORAL
Qty: 30 CAPSULE | Refills: 0 | Status: CANCELLED | OUTPATIENT
Start: 2019-06-25

## 2019-06-25 RX ADMIN — DENOSUMAB 120 MG: 120 INJECTION SUBCUTANEOUS at 11:09

## 2019-06-25 NOTE — PROGRESS NOTES
Subjective .     REASONS FOR FOLLOWUP:    1. Stage Ib ([id number]) right breast cancer: Diagnosed May 2010 with excisional biopsy for invasive ductal carcinoma, 1.3 cm, grade 2, ER 90%, PA 80%, HER-2/peter negative (1+ IHC).  Subsequent right mastectomy in July 2010 with no residual breast malignancy, 1/5 sentinel lymph node with micrometastasis (0.25 mm).  Treated in the [facility] system with adjuvant AC ×4 cycles in 2010 (no taxanes administered due to underlying Charcot-Saloni-Tooth with peripheral neuropathy).  Adjuvant Femara (postmenopausal) initiated October 2010 with plan to treat ×10 years.  Genetic testing reportedly negative.  Developed osteopenia treated with Prolia beginning 2/27/13.  2. Recurrent/metastatic disease identified 10/8/17 involving thoracic spine with cord compression at T6, lumbosacral involvement, sternal and right sternoclavicular involvement.  Femara discontinued.  Radiation administered (in the [facility] system) to the thoracic spine beginning 10/19/17 treating T3-T9 to a dose of 24 gray in 6 fractions.  3. Evaluation with MRI 12/8/17 showing persistent T6 cord compression with persistent neurologic compromise requiring surgical treatment 12/11/17 with T6 laminectomy/corpectomy and T3-T9 fusion.  Pathology with metastatic carcinoma of breast origin, ER negative, PA negative, HER-2/peter negative (1+ IHC).  4. Right pulmonary embolism 10/21/17 treated with Lovenox 1 mg/kg (100 mg) every 12 hours.  No evidence of DVT.  Lovenox held due to right gluteus hematoma 3/23/18 through 4/6/18.  Transitioned to oral Eliquis 5mg bid 1/28/19 (as of 2/25/19, patient still using previous supply of Lovenox).  5. Cancer related pain previously on Duragesic 50 µg patch every 72 hours and Dilaudid 4 mg as needed for breakthrough pain.  Narcotics subsequently discontinued with improvement in pain in January 2018.  6. Radiation therapy to L3 dural metastasis and left humerus initiated 1/15/18 (10 fractions),  completed 1/26/18  7. Monthly Xgeva initiated 1/23/18  8. Mild hypercalcemia of malignancy  9. Initiation of palliative oral single agent Xeloda 2/7/18 (2000 mg a.m., 1500 mg p.m. for 14/21 days).  10. 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  In reviewing prior CT scans, has been present for some time.  Monitor for now, if it enlarges consider surgical excision.  11. Cumulative side effects from Xeloda with fatigue, anorexia, diarrhea, increased tearing.  Alteration in dose/schedule with cycle 11 to 1500 mg twice a day for 7 days on followed by 7 days off.  12. Patient developed a an ulcer in the lateral aspect of the left foot, great toe.  This is healed well.  She will restart Xeloda on June 26, 2019.    HISTORY OF PRESENT ILLNESS:  The patient is a 58 y.o. year old female who is here for follow-up with the above-mentioned history.    History of Present Illness the patient returns today in follow up for scheduled lab review and Xgeva.  She wore an irritating peer of shoes a couple of weeks ago and developed a severe blister/lesion on the left foot just below the great toe on the lateral aspect.  She has seen wound care and the lesion is well-healed.  She did postpone restarting her Xeloda last week as a result of this lesion.  She did see wound care yesterday who has cleared her for resuming treatment.  Her symptoms have almost completely resolved.  She denies new skin issues.  She denies new symptoms.    She just returned from a vacation to [place].  Her energy level is good.  She denies nausea, vomiting, diarrhea, constipation.  She continues to diligently apply lotions to her hands and feet which have well managed her a Xeloda related symptoms.  Her appetite is good.  Her CBC is stable.    Does report occasional episodes of what sounds  like orthostatic hypotension.  She states that she has been slightly more dizzy when moving from a seated to standing position.  This is only with transitions of position and not when she is sitting or lying down.  She denies vision changes, headaches, speech disturbance.  She denies numbness or tingling.  She admits that she does not drink fluids regularly.  Blood pressure is stable.  Her vital signs are stable.            Past Medical History:   Diagnosis Date   • Acute pulmonary embolism, cancer-related 10/2017   • Allergic rhinitis    • Arthritis     Right knee; left shoulder   • Cancer (CMS/Formerly [facility]) 2010    Right breast   • Cancer (CMS/Formerly [facility]) 10/2017    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   • Charcot-Saloni-Tooth disease    • CPAP (continuous positive airway pressure) dependence    • GERD (gastroesophageal reflux disease)    • H/O Colon polyps    • H/O Uterine fibroid    • Heart murmur    • Hyperlipidemia    • Hypertension    • PONV (postoperative nausea and vomiting)      Past Surgical History:   Procedure Laterality Date   • BLADDER SURGERY      Bladder lift   • BREAST RECONSTRUCTION, BREAST TISSUE EXPANDER INSERTION Right    • BREAST SURGERY Right 2010    Mastectomy   •  SECTION  ,    • CHOLECYSTECTOMY     • COLONOSCOPY     • HERNIA REPAIR  2015    [facility]   • HYSTERECTOMY      Partial   • KNEE SURGERY Right    • LEG SURGERY Left     Broken   • MASTECTOMY Right    • WA TREAT TIBIAL SHAFT FX, INTRAMED IMPLANT Left 2017    Procedure: TIBIA INTRAMEDULLARY NAIL/DON INSERTION;  Surgeon: Romero Shanks MD;  Location: [facility];  Service: Orthopedics   • THORACIC DECOMPRESSION POSTERIOR FUSION WITH INSTRUMENTATION N/A 2017    Procedure: T6 costotransversectomy and decompression with T3 to  T9 posterior spinal fusion with instrumentation with Jennifer Gonzalez and Nael Dennis [facility];  Surgeon: Quan Nayak MD;  Location: [facility];  Service:        ONCOLOGIC  HISTORY:  (History from previous dates can be found in the separate document.)    Current Outpatient Medications on File Prior to Visit   Medication Sig Dispense Refill   • acetaminophen (TYLENOL) 500 MG tablet Take 500 mg by mouth Every 6 (Six) Hours As Needed for Mild Pain .     • apixaban (ELIQUIS) 5 MG tablet tablet Take 1 tablet by mouth Every 12 (Twelve) Hours. 60 tablet 5   • calcium carbonate (OS-CARY) 600 MG tablet Take 600 mg by mouth 2 (Two) Times a Day With Meals.     • capecitabine (XELODA) 500 MG chemo tablet Take 3 tabs (1500 mg) po twice a day for 7 days on then 7 days off. 84 tablet 3   • cholecalciferol (VITAMIN D3) 1000 units tablet Take 1,000 Units by mouth Daily.     • diazePAM (VALIUM) 10 MG tablet Take 1 tablet by mouth Every 12 (Twelve) Hours As Needed for Anxiety. 10 tablet 1   • diphenoxylate-atropine (LOMOTIL) 2.5-0.025 MG per tablet Take 1 tablet by mouth 4 (Four) Times a Day As Needed for Diarrhea. 60 tablet 2   • FLONASE ALLERGY RELIEF 50 MCG/ACT nasal spray 2 sprays into each nostril daily.  11   • gabapentin (NEURONTIN) 300 MG capsule Take 1 capsule by mouth Daily. 90 capsule 3   • mesalamine (LIALDA) 1.2 g EC tablet TAKE 1 TABLET BY MOUTH TWICE DAILY 180 tablet 0   • omeprazole (PriLOSEC) 40 MG capsule TAKE 1 CAPSULE DAILY 90 capsule 2   • ondansetron ODT (ZOFRAN-ODT) 8 MG disintegrating tablet Take 1 tablet by mouth Every 8 (Eight) Hours As Needed for Nausea or Vomiting. 30 tablet 1   • phenazopyridine (PYRIDIUM) 200 MG tablet Take 200 mg by mouth 3 (Three) Times a Day As Needed for bladder spasms.     • prochlorperazine (COMPAZINE) 10 MG tablet Take 1 tablet by mouth Every 6 (Six) Hours As Needed for Nausea or Vomiting. 30 tablet 0   • sennosides-docusate sodium (SENOKOT-S) 8.6-50 MG tablet Take 2 tablets by mouth 2 (Two) Times a Day. 90 tablet 2   • temazepam (RESTORIL) 15 MG capsule Take 1 capsule by mouth At Night As Needed for Sleep. 30 capsule 1   • traMADol (ULTRAM) 50 MG  tablet Take 1 tablet by mouth Every 8 (Eight) Hours As Needed for Moderate Pain . 90 tablet 4   • trimethoprim (TRIMPEX) 100 MG tablet Take 100 mg by mouth Daily.  2   • Tuberculin PPD (APLISOL ID) Inject  into the skin.     • Unable to find SWISH AND SPIT 5 ML PO Q 2 H PRN  0   • [DISCONTINUED] apixaban (ELIQUIS) 5 MG tablet tablet Take  by mouth 2 (Two) Times a Day.       No current facility-administered medications on file prior to visit.        ALLERGIES:     Allergies   Allergen Reactions   • Hydrocodone Nausea Only   • Morphine And Related Nausea And Vomiting     Social History     Socioeconomic History   • Marital status:      Spouse name: Murray   • Number of children: 3   • Years of education: College   • Highest education level: Not on file   Occupational History     Employer: [employer]     Employer: [employer]   Tobacco Use   • Smoking status: Never Smoker   • Smokeless tobacco: Never Used   Substance and Sexual Activity   • Alcohol use: Yes     Comment: social   • Drug use: No   • Sexual activity: Defer     Family History   Problem Relation Age of Onset   • Heart disease Mother    • Hyperlipidemia Mother    • Hypertension Mother    • Diabetes Father    • Charcot-Saloni-Tooth disease Father    • Heart disease Father    • Hypertension Father    • Heart failure Father    • Diabetes Sister    • Heart disease Sister    • Hypertension Sister    • Heart disease Maternal Aunt    • Scoliosis Maternal Aunt    • Heart disease Maternal Uncle    • Diabetes Maternal Grandmother    • Heart disease Maternal Grandmother    • Hypertension Maternal Grandmother    • Uterine cancer Maternal Grandmother    • Heart disease Maternal Grandfather      Review of Systems   Constitutional: Positive for fatigue. Negative for activity change, appetite change, fever and unexpected weight change.   HENT: Negative for congestion, mouth sores, nosebleeds, sore throat and voice change.    Eyes: Negative for discharge.   Respiratory:  Negative for cough, shortness of breath and wheezing.    Cardiovascular: Negative for chest pain, palpitations and leg swelling.        HPI   Gastrointestinal: Negative for abdominal distention, abdominal pain, blood in stool, constipation, diarrhea, nausea and vomiting.   Endocrine: Negative for cold intolerance and heat intolerance.   Genitourinary: Negative for difficulty urinating, dysuria, frequency and hematuria.   Musculoskeletal: Positive for arthralgias. Negative for back pain, joint swelling and myalgias.   Skin: Positive for rash. Negative for wound.        See HPI   Neurological: Positive for light-headedness (see hpi). Negative for dizziness, syncope, weakness, numbness and headaches.   Hematological: Negative for adenopathy. Does not bruise/bleed easily.   Psychiatric/Behavioral: Positive for sleep disturbance (She is requesting a refill of Temazopam today). Negative for confusion. The patient is not nervous/anxious.          Objective      Vitals:    06/25/19 1015   BP: 126/79   Pulse: 100   Resp: 20   Temp: 98.5 °F (36.9 °C)   SpO2: 96%      Current Status 5/28/2019   ECOG score 1   Pain 0/10    Physical Exam   Constitutional: She is oriented to person, place, and time. She appears well-developed and well-nourished.   HENT:   Mouth/Throat: Oropharynx is clear and moist.   Eyes: Conjunctivae are normal.   Neck: No thyromegaly present.   Cardiovascular: Normal rate. Exam reveals no gallop and no friction rub.   No murmur heard.  Pulmonary/Chest: Effort normal. No respiratory distress.   Abdominal: Soft. She exhibits no distension. There is no tenderness.   Musculoskeletal: She exhibits edema.   Lower extremity braces in place.  Trace bilateral lower extremity edema.  Stable.   Neurological: She is alert and oriented to person, place, and time. No cranial nerve deficit.   Bilateral lower extremity strength, 4+/5   Skin: Skin is warm and dry. No rash noted.   New skin noted on the left foot just below  the great toe on the lateral aspect.  There is dry skin outlining an oblong shape where a blister had been.  There is no heat, tenderness or drainage.  No peeling noted of the soles of the feet or palms of the hand.   Psychiatric: She has a normal mood and affect. Her behavior is normal.       RECENT LABS:  Hematology WBC   Date Value Ref Range Status   06/25/2019 4.18 3.40 - 10.80 10*3/mm3 Final     RBC   Date Value Ref Range Status   06/25/2019 4.08 3.77 - 5.28 10*6/mm3 Final     Hemoglobin   Date Value Ref Range Status   06/25/2019 13.3 12.0 - 15.9 g/dL Final     Hematocrit   Date Value Ref Range Status   06/25/2019 40.4 34.0 - 46.6 % Final     Platelets   Date Value Ref Range Status   06/25/2019 232 140 - 450 10*3/mm3 Final        Lab Results   Component Value Date    GLUCOSE 128 (H) 06/25/2019    BUN 19 06/25/2019    CREATININE 0.93 06/25/2019    EGFRIFNONA 62 06/25/2019    EGFRIFAFRI 80 09/25/2017    BCR 20.4 06/25/2019    K 4.5 06/25/2019    CO2 26.4 06/25/2019    CALCIUM 9.7 06/25/2019    PROTENTOTREF 6.4 09/25/2017    ALBUMIN 4.10 06/25/2019    LABIL2 2.2 09/25/2017    AST 19 06/25/2019    ALT 17 06/25/2019         Assessment/Plan      1. Previous Stage Ib ([id number]) right breast cancer:  · Diagnosed May 2010 with excisional biopsy for invasive ductal carcinoma, 1.3 cm, grade 2, ER 90%, MS 80%, HER-2/peter negative (1+ IHC).    · Subsequent right mastectomy in July 2010 with no residual breast malignancy, 1/5 sentinel lymph node with micrometastasis (0.25 mm).    · Treated in the [facility] system with adjuvant AC ×4 cycles in 2010 (no taxanes administered due to underlying Charcot-Saloni-Tooth with peripheral neuropathy).    · Adjuvant Femara (postmenopausal) initiated October 2010 with plan to treat ×10 years.    · Genetic testing reportedly negative.    · Developed osteopenia treated with Prolia beginning 2/27/13. Subsequently discontinued due to identification of metastatic disease.  2. Recurrent/metastatic  disease identified 10/8/17:  · Disease involving thoracic spine with cord compression at T6, lumbosacral involvement, sternal and right sternoclavicular involvement.    · Femara discontinued in 10/2017.    · Radiation administered (in the [facility] system) to the thoracic spine beginning 10/19/17 treating T3-T9 to a dose of 24 gray in 6 fractions.  · Evaluation with MRI 12/8/17 showing persistent T6 cord compression with persistent neurologic compromise requiring surgical treatment 12/11/17 with T6 laminectomy/corpectomy and T3-T9 fusion.  Pathology with metastatic carcinoma of breast origin, ER negative, VT negative, HER-2/peter negative (1+ IHC).  · Additional staging evaluation 12/8/17 with no evidence of visceral metastatic disease, bone scan showing involvement of thoracic spine, sternum, left humerus, mid frontal bone.  No plane film correlate of left humerus lesion.  MRI lumbar spine with small intradural L3 metastasis.  CA 15-3 12/6/17- 17.  · Palliative radiation therapy to L3 dural metastasis and left humerus initiated 1/15/18 (10 fractions), completed 1/26/18.  · Hypercalcemia of malignancy with calcium in the 10-11 range.  · Initiation of monthly Xgeva 1/23/18.  · Baseline CT scan 1/30/18 with no evidence of visceral involvement.  Cluster of nodular opacities in the right lower lobe suspected to be infectious or related to bronchiolitis. Bone scan 1/30/18 showed postsurgical change in the thoracic spine, stable uptake in the frontal bone, no new areas of disease.  · Initiation of palliative oral single agent Xeloda 2/7/18 2000 mg a.m., 1500 mg p.m. for 14/21 days.   · Following 3 cycles xeloda, bone scan 4/4/18 showed no change from the prior study.  CT scan 4/4/18 showed a small pericardial effusion of unclear significance as well as a subcutaneous nodule in the right lateral chest wall.  Subsequent evaluation with echocardiogram 4/17/18 showed no evidence of pericardial effusion.  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  We discussed the possibility of surgical excision of the right subcutaneous chest wall lesion for more definitive diagnosis.  Reviewed previous CT images dating back to 12/8/17 and the lesion was present even at that time measuring around 1.7 cm although not commented on in the radiology report.  As this appears to be an indolent low-grade process unrelated to her breast cancer, recommendee foregoing surgical excision at this time and monitoring this area on future scans.  The patient agreed.    · Following 6 cycles of Xeloda, CT 6/6/18  showed stable findings, no evidence of progressive disease.  There was a comment regarding subcutaneous abnormality in the anterior abdominal wall and this was related to Lovenox injection sites.  Bone scan 6/6/18 showed no interval change.   · CT scan and bone scan 8/13/18 following 9 cycles of Xeloda showed stable findings with no evidence of significant visceral metastases.  Her bone lesions appear stable on bone scan.  The spindle cell neoplasm in her right chest wall actually decreased in size from 2 cm down to 1.6 cm.    · The patient experienced some symptoms of diarrhea, anorexia, generalized weakness during cycle 9 Xeloda it was unclear whether this was related to a viral gastroenteritis or toxicity from treatment.  Symptoms recurred during cycle 10 and treatment was cut short by 2 days.  Symptoms attributed to Xeloda.  With cycle 11, dose and schedule altered to 1500 mg twice daily for 7 days on followed by 7 days off .  · CT scan and bone scan from 10/31/18 following 12 cycles of Xeloda showed stable disease.    · CT scans and bone scan 1/21/19 following 15 cycles of Xeloda showed stable findings.  · CT scan and bone scan following 18 cycles of Xeloda 4/24/2019 showed stable findings.    · Mrs Davey returns  today, June 25, 2019 for lab review, toxicity check in anticipation of her Xgeva.  She is scheduled to restart Xeloda tomorrow, June 26, 2019.  3. Right pulmonary embolism:  · Diagnosed on CT angiogram 10/21/17 involving small right lower lobe pulmonary artery.  Lower extremity Dopplers negative.  · Bilateral lower extremity Dopplers negative again 12/5/17.  · She is currently on Eliquis 5 mg twice daily.  · Patient will hold Eliquis 2 days prior to her upcoming surgical procedure on 7/15/2019 and will resume Eliquis the day after the procedure.  4. Cancer related pain:  · Previously receiving Duragesic 50 µg patch every 72 hours along with Dilaudid 4 mg as needed for breakthrough pain  · The patient's pain improved over time and she was able to discontinue both Duragesic and Dilaudid in the interval.  · Patient does take occasional Flexeril at bedtime due to back spasm/pain when she has been more active.  · The patient does have some occasional aggravation of her chronic back pain with significant rehabilitation activity and requires an occasional dose of Dilaudid.  No narcotic requirements recently.  He is not requesting refills today.  5. Chemotherapy-induced diarrhea with subsequent C. difficile colitis:  · The patient developed initial diarrhea related to Xeloda at regular dosing.  · Symptoms improved on reduced dose Xeloda  · Flare of symptoms in October 2018 with apparent finding of C. difficile colitis by GI, treated with course of oral vancomycin with improvement in symptoms.  · Patient notes minimal intermittent diarrhea on Xeloda requiring occasional dosing of Imodium.  Able.  6. Traumatic left tibia/fibular fracture:  · Status post ORIF 12/6/17  · Specimen was sent for pathologic review, negative for evidence of malignancy  7. Hypercalcemia:  · Suspect hypercalcemia of malignancy, calcium in  10-11 range previously.  · Calcium normalized following initiation of monthly Xgeva on 1/23/18.  LCM today 9.7,  6/25/2019.  8. Chemotherapy-induced mucositis:  · Patient had a minimal degree of mucositis with cycle 2.  The patient has magic mouthwash to use as needed.  No subsequent mucositis.  9. Recurrent UTI, bladder wall thickening on CT:  · Patient had an enterococcal UTI on 3/2/18 sensitive to nitrofurantoin and received treatment, unclear how long.  · Recurrent UTI 3/20/18 with urine culture growing Klebsiella, initially treated with nitrofurantoin, transitioned to Levaquin.  · CT 4/4/18 with diffuse bladder wall thickening with increased nodular thickening at the left base.  Referral to urogynecology Dr. May Johnson.  She was placed on a prophylactic dose of trimethoprim 100 mg daily, bladder wall thickening felt to be related to recent recurrent urinary tract infections.  · Patient with urinary symptoms, treated with course of Macrobid at the end of December 2018, urine culture however was negative 12/31/18.  10.   Mobility:  · The patient underwent an intensive course of [facility] at [facility].  She graduated from her outpatient course November 2018.  · Overall the patient has improved dramatically in terms of mobility, now walking around 1 mile per day without assistance.  She does utilize a cane as necessary.  11.  Depression:  · The patient weaned herself off of Cymbalta and reports that her symptoms remain stable.  12. 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  Symptoms did improve and she continued on the same dose.    · Interestingly, symptoms are very manageable and mild currently with only slight erythema and some skin cracking in the hands.  13. Elevated liver function studies:  · Liver function studies increased 8/20/19 with ALT 98, AST 70, normal total bilirubin.  · Negative viral hepatitis A, B, and C panel  8/23/18  · Likely related to hepatic steatosis seen on CT, no definitive evidence of metastatic liver lesions.   · Subsequent improvement in LFTs  · AST and ALT in the office today are 19 and 17 respectively.  14. Chemotherapy induced leukopenia:  · Solved.  The patient's total white count today is 4.18 and ANC is 2.60.  15. GERD:  · Prilosec 40 mg twice daily.  16. Insomnia:  · Patient with prior paradoxical reaction to Benadryl  · Improved on temazepam 15 mg nightly as needed.  He is requesting a refill of temazepam.  17.  Can lesion on the left foot.  This is well-healed.    Plan:  1. Begin cycle 20 Xeloda continuing with 1500 mg twice a day 7 days on followed by 7 days off.   2. Monthly Xgeva today  3. Continue Eliquis 5 mg twice daily.  4. Continue use of emollient cream on the hands and feet and continue to wear socks and gloves at night.  5. On 7/15/2019 the patient will undergo surgery on right fifth metatarsal head with Dr. Roman Diana.  Hold Eliquis 2 days prior to procedure and resume day after.  Patient will hold Xeloda beginning 7/8/2019 (1 week prior to procedure) and will remain off Xeloda until MD visit.  6. Return as scheduled on July 23, 2019 for CT chest abdomen pelvis and bone scan  7. She will see Dr. Hancock for scan review.  On July 29, 2019 MD with CBC, CMP, magnesium, phosphorus.  Patient will be due for Xgeva and to begin next cycle Xeloda pending postoperative status.  8. I will call in a refill of temazepam today.    Of asked the patient to call the office with any new or worsening symptoms before her scheduled follow-up.  She and her  agreed to do so.

## 2019-06-26 RX ORDER — DIAZEPAM 10 MG/1
TABLET ORAL
Qty: 10 TABLET | Refills: 0 | OUTPATIENT
Start: 2019-06-26

## 2019-06-26 RX ORDER — TEMAZEPAM 15 MG/1
15 CAPSULE ORAL NIGHTLY PRN
Qty: 30 CAPSULE | Refills: 1 | Status: SHIPPED | OUTPATIENT
Start: 2019-06-26 | End: 2019-08-26 | Stop reason: SDUPTHER

## 2019-07-11 ENCOUNTER — TELEPHONE (OUTPATIENT)
Dept: ONCOLOGY | Facility: CLINIC | Age: 59
End: 2019-07-11

## 2019-07-11 NOTE — TELEPHONE ENCOUNTER
Orders signed per dr. Hancock for pt. To d/c eliquis for 1-2 days prior to procedure.  Faxed to [facility] at [phone].  Confirmation received.

## 2019-07-23 ENCOUNTER — HOSPITAL ENCOUNTER (OUTPATIENT)
Dept: NUCLEAR MEDICINE | Facility: HOSPITAL | Age: 59
Discharge: HOME | End: 2019-07-23

## 2019-07-23 ENCOUNTER — HOSPITAL ENCOUNTER (OUTPATIENT)
Dept: CT IMAGING | Facility: HOSPITAL | Age: 59
Discharge: HOME | End: 2019-07-23
Admitting: INTERNAL MEDICINE

## 2019-07-23 DIAGNOSIS — C50.811 MALIGNANT NEOPLASM OF OVERLAPPING SITES OF RIGHT BREAST IN FEMALE, ESTROGEN RECEPTOR NEGATIVE (HCC): ICD-10-CM

## 2019-07-23 DIAGNOSIS — Z17.1 MALIGNANT NEOPLASM OF OVERLAPPING SITES OF RIGHT BREAST IN FEMALE, ESTROGEN RECEPTOR NEGATIVE (HCC): ICD-10-CM

## 2019-07-23 LAB — CREAT BLDA-MCNC: 1 MG/DL (ref 0.6–1.3)

## 2019-07-23 PROCEDURE — 71260 CT THORAX DX C+: CPT

## 2019-07-23 PROCEDURE — 74177 CT ABD & PELVIS W/CONTRAST: CPT

## 2019-07-23 PROCEDURE — 78306 BONE IMAGING WHOLE BODY: CPT

## 2019-07-23 PROCEDURE — 82565 ASSAY OF CREATININE: CPT

## 2019-07-23 PROCEDURE — 0 TECHNETIUM MEDRONATE KIT: Performed by: INTERNAL MEDICINE

## 2019-07-23 PROCEDURE — 25010000002 IOPAMIDOL 61 % SOLUTION: Performed by: INTERNAL MEDICINE

## 2019-07-23 PROCEDURE — A9503 TC99M MEDRONATE: HCPCS | Performed by: INTERNAL MEDICINE

## 2019-07-23 RX ORDER — TC 99M MEDRONATE 20 MG/10ML
21.9 INJECTION, POWDER, LYOPHILIZED, FOR SOLUTION INTRAVENOUS
Status: COMPLETED | OUTPATIENT
Start: 2019-07-23 | End: 2019-07-23

## 2019-07-23 RX ADMIN — IOPAMIDOL 85 ML: 612 INJECTION, SOLUTION INTRAVENOUS at 11:30

## 2019-07-23 RX ADMIN — Medication 21.9 MILLICURIE: at 10:40

## 2019-07-29 ENCOUNTER — INFUSION (OUTPATIENT)
Dept: ONCOLOGY | Facility: HOSPITAL | Age: 59
End: 2019-07-29

## 2019-07-29 ENCOUNTER — OFFICE VISIT (OUTPATIENT)
Dept: ONCOLOGY | Facility: CLINIC | Age: 59
End: 2019-07-29

## 2019-07-29 ENCOUNTER — LAB (OUTPATIENT)
Dept: OTHER | Facility: HOSPITAL | Age: 59
End: 2019-07-29

## 2019-07-29 VITALS
DIASTOLIC BLOOD PRESSURE: 84 MMHG | OXYGEN SATURATION: 96 % | TEMPERATURE: 97.7 F | HEIGHT: 61 IN | SYSTOLIC BLOOD PRESSURE: 130 MMHG | HEART RATE: 73 BPM | RESPIRATION RATE: 16 BRPM | WEIGHT: 199 LBS | BODY MASS INDEX: 37.57 KG/M2

## 2019-07-29 DIAGNOSIS — C50.811 MALIGNANT NEOPLASM OF OVERLAPPING SITES OF RIGHT BREAST IN FEMALE, ESTROGEN RECEPTOR NEGATIVE (HCC): Primary | ICD-10-CM

## 2019-07-29 DIAGNOSIS — Z17.1 MALIGNANT NEOPLASM OF OVERLAPPING SITES OF RIGHT BREAST IN FEMALE, ESTROGEN RECEPTOR NEGATIVE (HCC): Primary | ICD-10-CM

## 2019-07-29 DIAGNOSIS — Z17.1 MALIGNANT NEOPLASM OF OVERLAPPING SITES OF RIGHT BREAST IN FEMALE, ESTROGEN RECEPTOR NEGATIVE (HCC): ICD-10-CM

## 2019-07-29 DIAGNOSIS — R30.0 DYSURIA: ICD-10-CM

## 2019-07-29 DIAGNOSIS — C50.811 MALIGNANT NEOPLASM OF OVERLAPPING SITES OF RIGHT BREAST IN FEMALE, ESTROGEN RECEPTOR NEGATIVE (HCC): ICD-10-CM

## 2019-07-29 LAB
ALBUMIN SERPL-MCNC: 4.1 G/DL (ref 3.5–5.2)
ALBUMIN/GLOB SERPL: 1.6 G/DL
ALP SERPL-CCNC: 66 U/L (ref 39–117)
ALT SERPL W P-5'-P-CCNC: 15 U/L (ref 1–33)
ANION GAP SERPL CALCULATED.3IONS-SCNC: 10.6 MMOL/L (ref 5–15)
AST SERPL-CCNC: 17 U/L (ref 1–32)
BACTERIA UR QL AUTO: ABNORMAL /HPF
BASOPHILS # BLD AUTO: 0.05 10*3/MM3 (ref 0–0.2)
BASOPHILS NFR BLD AUTO: 0.6 % (ref 0–1.5)
BILIRUB SERPL-MCNC: 0.3 MG/DL (ref 0.1–1.2)
BILIRUB UR QL STRIP: NEGATIVE
BUN BLD-MCNC: 18 MG/DL (ref 6–20)
BUN/CREAT SERPL: 16.5 (ref 7–25)
CALCIUM SPEC-SCNC: 9.2 MG/DL (ref 8.6–10.5)
CHLORIDE SERPL-SCNC: 106 MMOL/L (ref 98–107)
CLARITY UR: ABNORMAL
CO2 SERPL-SCNC: 28.4 MMOL/L (ref 22–29)
COLOR UR: YELLOW
CREAT BLD-MCNC: 1.09 MG/DL (ref 0.57–1)
DEPRECATED RDW RBC AUTO: 51.1 FL (ref 37–54)
EOSINOPHIL # BLD AUTO: 0.27 10*3/MM3 (ref 0–0.4)
EOSINOPHIL NFR BLD AUTO: 3.5 % (ref 0.3–6.2)
ERYTHROCYTE [DISTWIDTH] IN BLOOD BY AUTOMATED COUNT: 14.6 % (ref 12.3–15.4)
GFR SERPL CREATININE-BSD FRML MDRD: 52 ML/MIN/1.73
GLOBULIN UR ELPH-MCNC: 2.5 GM/DL
GLUCOSE BLD-MCNC: 128 MG/DL (ref 65–99)
GLUCOSE UR STRIP-MCNC: NEGATIVE MG/DL
HCT VFR BLD AUTO: 40.1 % (ref 34–46.6)
HGB BLD-MCNC: 13.1 G/DL (ref 12–15.9)
HGB UR QL STRIP.AUTO: ABNORMAL
HYALINE CASTS UR QL AUTO: ABNORMAL /LPF
IMM GRANULOCYTES # BLD AUTO: 0.03 10*3/MM3 (ref 0–0.05)
IMM GRANULOCYTES NFR BLD AUTO: 0.4 % (ref 0–0.5)
KETONES UR QL STRIP: NEGATIVE
LEUKOCYTE ESTERASE UR QL STRIP.AUTO: ABNORMAL
LYMPHOCYTES # BLD AUTO: 1.47 10*3/MM3 (ref 0.7–3.1)
LYMPHOCYTES NFR BLD AUTO: 18.9 % (ref 19.6–45.3)
MAGNESIUM SERPL-MCNC: 1.9 MG/DL (ref 1.6–2.6)
MCH RBC QN AUTO: 31.8 PG (ref 26.6–33)
MCHC RBC AUTO-ENTMCNC: 32.7 G/DL (ref 31.5–35.7)
MCV RBC AUTO: 97.3 FL (ref 79–97)
MONOCYTES # BLD AUTO: 0.54 10*3/MM3 (ref 0.1–0.9)
MONOCYTES NFR BLD AUTO: 6.9 % (ref 5–12)
NEUTROPHILS # BLD AUTO: 5.42 10*3/MM3 (ref 1.7–7)
NEUTROPHILS NFR BLD AUTO: 69.7 % (ref 42.7–76)
NITRITE UR QL STRIP: POSITIVE
NRBC BLD AUTO-RTO: 0 /100 WBC (ref 0–0.2)
PH UR STRIP.AUTO: 5.5 [PH] (ref 5–8)
PHOSPHATE SERPL-MCNC: 4.2 MG/DL (ref 2.5–4.5)
PLATELET # BLD AUTO: 265 10*3/MM3 (ref 140–450)
PMV BLD AUTO: 10.4 FL (ref 6–12)
POTASSIUM BLD-SCNC: 4.6 MMOL/L (ref 3.5–5.2)
PROT SERPL-MCNC: 6.6 G/DL (ref 6–8.5)
PROT UR QL STRIP: ABNORMAL
RBC # BLD AUTO: 4.12 10*6/MM3 (ref 3.77–5.28)
RBC # UR: ABNORMAL /HPF
REF LAB TEST METHOD: ABNORMAL
SODIUM BLD-SCNC: 145 MMOL/L (ref 136–145)
SP GR UR STRIP: 1.02 (ref 1–1.03)
SQUAMOUS #/AREA URNS HPF: ABNORMAL /HPF
TRANS CELLS #/AREA URNS HPF: ABNORMAL /HPF
UROBILINOGEN UR QL STRIP: ABNORMAL
WBC NRBC COR # BLD: 7.78 10*3/MM3 (ref 3.4–10.8)
WBC UR QL AUTO: ABNORMAL /HPF

## 2019-07-29 PROCEDURE — 85025 COMPLETE CBC W/AUTO DIFF WBC: CPT | Performed by: INTERNAL MEDICINE

## 2019-07-29 PROCEDURE — 84100 ASSAY OF PHOSPHORUS: CPT | Performed by: INTERNAL MEDICINE

## 2019-07-29 PROCEDURE — 80053 COMPREHEN METABOLIC PANEL: CPT | Performed by: INTERNAL MEDICINE

## 2019-07-29 PROCEDURE — 99215 OFFICE O/P EST HI 40 MIN: CPT | Performed by: INTERNAL MEDICINE

## 2019-07-29 PROCEDURE — 25010000002 DENOSUMAB 120 MG/1.7ML SOLUTION: Performed by: INTERNAL MEDICINE

## 2019-07-29 PROCEDURE — 83735 ASSAY OF MAGNESIUM: CPT | Performed by: INTERNAL MEDICINE

## 2019-07-29 PROCEDURE — 36415 COLL VENOUS BLD VENIPUNCTURE: CPT

## 2019-07-29 PROCEDURE — 87077 CULTURE AEROBIC IDENTIFY: CPT | Performed by: INTERNAL MEDICINE

## 2019-07-29 PROCEDURE — 87086 URINE CULTURE/COLONY COUNT: CPT | Performed by: INTERNAL MEDICINE

## 2019-07-29 PROCEDURE — 87186 SC STD MICRODIL/AGAR DIL: CPT | Performed by: INTERNAL MEDICINE

## 2019-07-29 PROCEDURE — 81001 URINALYSIS AUTO W/SCOPE: CPT | Performed by: INTERNAL MEDICINE

## 2019-07-29 PROCEDURE — 96372 THER/PROPH/DIAG INJ SC/IM: CPT | Performed by: INTERNAL MEDICINE

## 2019-07-29 RX ADMIN — DENOSUMAB 120 MG: 120 INJECTION SUBCUTANEOUS at 11:22

## 2019-07-29 NOTE — PROGRESS NOTES
Subjective .     REASONS FOR FOLLOWUP:    1. Stage Ib ([id number]) right breast cancer: Diagnosed May 2010 with excisional biopsy for invasive ductal carcinoma, 1.3 cm, grade 2, ER 90%, WA 80%, HER-2/peter negative (1+ IHC).  Subsequent right mastectomy in July 2010 with no residual breast malignancy, 1/5 sentinel lymph node with micrometastasis (0.25 mm).  Treated in the [facility] system with adjuvant AC ×4 cycles in 2010 (no taxanes administered due to underlying Charcot-Saloni-Tooth with peripheral neuropathy).  Adjuvant Femara (postmenopausal) initiated October 2010 with plan to treat ×10 years.  Genetic testing reportedly negative.  Developed osteopenia treated with Prolia beginning 2/27/13.  2. Recurrent/metastatic disease identified 10/8/17 involving thoracic spine with cord compression at T6, lumbosacral involvement, sternal and right sternoclavicular involvement.  Femara discontinued.  Radiation administered (in the [facility] system) to the thoracic spine beginning 10/19/17 treating T3-T9 to a dose of 24 gray in 6 fractions.  3. Evaluation with MRI 12/8/17 showing persistent T6 cord compression with persistent neurologic compromise requiring surgical treatment 12/11/17 with T6 laminectomy/corpectomy and T3-T9 fusion.  Pathology with metastatic carcinoma of breast origin, ER negative, WA negative, HER-2/peter negative (1+ IHC).  4. Right pulmonary embolism 10/21/17 treated with Lovenox 1 mg/kg (100 mg) every 12 hours.  No evidence of DVT.  Lovenox held due to right gluteus hematoma 3/23/18 through 4/6/18.  Transitioned to oral Eliquis 5mg bid 1/28/19 (as of 2/25/19, patient still using previous supply of Lovenox).  5. Cancer related pain previously on Duragesic 50 µg patch every 72 hours and Dilaudid 4 mg as needed for breakthrough pain.  Narcotics subsequently discontinued with improvement in pain in January 2018.  6. Radiation therapy to L3 dural metastasis and left humerus initiated 1/15/18 (10 fractions),  completed 1/26/18  7. Monthly Xgeva initiated 1/23/18  8. Mild hypercalcemia of malignancy  9. Initiation of palliative oral single agent Xeloda 2/7/18 (2000 mg a.m., 1500 mg p.m. for 14/21 days).  10. 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  In reviewing prior CT scans, has been present for some time.  Monitor for now, if it enlarges consider surgical excision.  11. Cumulative side effects from Xeloda with fatigue, anorexia, diarrhea, increased tearing.  Alteration in dose/schedule with cycle 11 to 1500 mg twice a day for 7 days on followed by 7 days off.    HISTORY OF PRESENT ILLNESS:  The patient is a 58 y.o. year old female who is here for follow-up with the above-mentioned history.    History of Present Illness the patient returns today in follow-up continuing on oral Xeloda and also continuing on monthly Xgeva that is due today with laboratory studies, CT scan, and bone scan to review.  In the interval, the patient did undergo her surgical procedure on 7/15/2019 for left fifth metatarsal resection with Dr. Diana.  The patient held oral Xeloda beginning 7/8/2019 however resumed oral Xeloda on 7/17/2019 on schedule inadvertently as we had previously discussed holding this for a week postoperatively.  Fortunately, she has had no difficulties with wound healing.  She has a boot in place in the left foot.  She has been doing very well overall.  She reports minimal hand-foot symptoms.  She notes that pain after surgery has been minimal and she is treating this with tramadol and Tylenol.  She is now back to ambulating with use of her cane.  She does note over the past few days increase in urinary frequency as well as dysuria and mild back pain and she is concerned regarding recurrent urinary tract infection.    Past Medical History:   Diagnosis  Date   • Acute pulmonary embolism, cancer-related 10/2017   • Allergic rhinitis    • Arthritis     Right knee; left shoulder   • Cancer (CMS/[facility]) 2010    Right breast   • Cancer (CMS/[facility]) 10/2017    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   • Charcot-Saloni-Tooth disease    • CPAP (continuous positive airway pressure) dependence    • GERD (gastroesophageal reflux disease)    • H/O Colon polyps    • H/O Uterine fibroid    • Heart murmur    • Hyperlipidemia    • Hypertension    • PONV (postoperative nausea and vomiting)      Past Surgical History:   Procedure Laterality Date   • BLADDER SURGERY      Bladder lift   • BREAST RECONSTRUCTION, BREAST TISSUE EXPANDER INSERTION Right    • BREAST SURGERY Right 2010    Mastectomy   •  SECTION  ,    • CHOLECYSTECTOMY     • COLONOSCOPY     • HERNIA REPAIR  2015    [facility]   • HYSTERECTOMY      Partial   • KNEE SURGERY Right    • LEG SURGERY Left     Broken   • MASTECTOMY Right    • MI TREAT TIBIAL SHAFT FX, INTRAMED IMPLANT Left 2017    Procedure: TIBIA INTRAMEDULLARY NAIL/DON INSERTION;  Surgeon: Romero Shanks MD;  Location: [facility];  Service: Orthopedics   • THORACIC DECOMPRESSION POSTERIOR FUSION WITH INSTRUMENTATION N/A 2017    Procedure: T6 costotransversectomy and decompression with T3 to  T9 posterior spinal fusion [facility];  Surgeon: Quan Nayak MD;  Location: [facility];  Service:        ONCOLOGIC HISTORY:  (History from previous dates can be found in the separate document.)    Current Outpatient Medications on File Prior to Visit   Medication Sig Dispense Refill   • acetaminophen (TYLENOL) 500 MG tablet Take 500 mg by mouth Every 6 (Six) Hours As Needed for Mild Pain .     • apixaban (ELIQUIS) 5 MG tablet tablet Take 1 tablet by mouth Every 12 (Twelve) Hours. 60 tablet 5   • calcium carbonate (OS-CARY) 600 MG tablet Take 600 mg by mouth 2 (Two) Times a Day  With Meals.     • capecitabine (XELODA) 500 MG chemo tablet Take 3 tabs (1500 mg) po twice a day for 7 days on then 7 days off. 84 tablet 3   • cholecalciferol (VITAMIN D3) 1000 units tablet Take 1,000 Units by mouth Daily.     • diazePAM (VALIUM) 10 MG tablet Take 1 tablet by mouth Every 12 (Twelve) Hours As Needed for Anxiety. 10 tablet 1   • diphenoxylate-atropine (LOMOTIL) 2.5-0.025 MG per tablet Take 1 tablet by mouth 4 (Four) Times a Day As Needed for Diarrhea. 60 tablet 2   • FLONASE ALLERGY RELIEF 50 MCG/ACT nasal spray 2 sprays into each nostril daily.  11   • gabapentin (NEURONTIN) 300 MG capsule Take 1 capsule by mouth Daily. 90 capsule 3   • mesalamine (LIALDA) 1.2 g EC tablet TAKE 1 TABLET BY MOUTH TWICE DAILY 180 tablet 0   • omeprazole (PriLOSEC) 40 MG capsule TAKE 1 CAPSULE DAILY 90 capsule 2   • ondansetron ODT (ZOFRAN-ODT) 8 MG disintegrating tablet Take 1 tablet by mouth Every 8 (Eight) Hours As Needed for Nausea or Vomiting. 30 tablet 1   • phenazopyridine (PYRIDIUM) 200 MG tablet Take 200 mg by mouth 3 (Three) Times a Day As Needed for bladder spasms.     • prochlorperazine (COMPAZINE) 10 MG tablet Take 1 tablet by mouth Every 6 (Six) Hours As Needed for Nausea or Vomiting. 30 tablet 0   • sennosides-docusate sodium (SENOKOT-S) 8.6-50 MG tablet Take 2 tablets by mouth 2 (Two) Times a Day. 90 tablet 2   • temazepam (RESTORIL) 15 MG capsule Take 1 capsule by mouth At Night As Needed for Sleep. 30 capsule 1   • traMADol (ULTRAM) 50 MG tablet Take 1 tablet by mouth Every 8 (Eight) Hours As Needed for Moderate Pain . 90 tablet 4   • trimethoprim (TRIMPEX) 100 MG tablet Take 100 mg by mouth Daily.  2   • Tuberculin PPD (APLISOL ID) Inject  into the skin.     • Unable to find SWISH AND SPIT 5 ML PO Q 2 H PRN  0     No current facility-administered medications on file prior to visit.        ALLERGIES:     Allergies   Allergen Reactions   • Hydrocodone Nausea Only   • Morphine And Related Nausea And  Vomiting     Social History     Socioeconomic History   • Marital status:      Spouse name: Murray   • Number of children: 3   • Years of education: College   • Highest education level: Not on file   Occupational History     Employer: [employer]     Employer: RETIRED   Tobacco Use   • Smoking status: Never Smoker   • Smokeless tobacco: Never Used   Substance and Sexual Activity   • Alcohol use: Yes     Comment: social   • Drug use: No   • Sexual activity: Defer     Family History   Problem Relation Age of Onset   • Heart disease Mother    • Hyperlipidemia Mother    • Hypertension Mother    • Diabetes Father    • Charcot-Saloni-Tooth disease Father    • Heart disease Father    • Hypertension Father    • Heart failure Father    • Diabetes Sister    • Heart disease Sister    • Hypertension Sister    • Heart disease Maternal Aunt    • Scoliosis Maternal Aunt    • Heart disease Maternal Uncle    • Diabetes Maternal Grandmother    • Heart disease Maternal Grandmother    • Hypertension Maternal Grandmother    • Uterine cancer Maternal Grandmother    • Heart disease Maternal Grandfather      Review of Systems   Constitutional: Positive for fatigue. Negative for activity change, appetite change, fever and unexpected weight change.   HENT: Negative for congestion, mouth sores, nosebleeds, sore throat and voice change.    Eyes: Negative for discharge.   Respiratory: Negative for cough, shortness of breath and wheezing.    Cardiovascular: Negative for chest pain, palpitations and leg swelling.   Gastrointestinal: Negative for abdominal distention, abdominal pain, blood in stool, constipation, diarrhea, nausea and vomiting.   Endocrine: Negative for cold intolerance and heat intolerance.   Genitourinary: Positive for dysuria, flank pain and urgency. Negative for difficulty urinating, frequency and hematuria.   Musculoskeletal: Positive for arthralgias. Negative for back pain, joint swelling and myalgias.   Skin: Positive  for rash. Negative for wound.   Neurological: Negative for dizziness, syncope, weakness, light-headedness, numbness and headaches.   Hematological: Negative for adenopathy. Does not bruise/bleed easily.   Psychiatric/Behavioral: Negative for confusion and sleep disturbance. The patient is not nervous/anxious.          Objective      Vitals:    07/29/19 1001   BP: 130/84   Pulse: 73   Resp: 16   Temp: 97.7 °F (36.5 °C)   SpO2: 96%      Current Status 7/29/2019   ECOG score 0   Pain 6/10    Physical Exam   Constitutional: She is oriented to person, place, and time. She appears well-developed and well-nourished.   HENT:   Mouth/Throat: Oropharynx is clear and moist.   Eyes: Conjunctivae are normal.   Neck: No thyromegaly present.   Cardiovascular: Normal rate and regular rhythm. Exam reveals no gallop and no friction rub.   No murmur heard.  Pulmonary/Chest: Breath sounds normal. No respiratory distress.   Abdominal: Soft. Bowel sounds are normal. She exhibits no distension. There is no tenderness.   Musculoskeletal: She exhibits edema.   Lower extremity braces in place.  Trace bilateral lower extremity edema.  There is a boot in place on the left lower extremity   Lymphadenopathy:        Head (right side): No submandibular adenopathy present.     She has no cervical adenopathy.     She has no axillary adenopathy.        Right: No inguinal and no supraclavicular adenopathy present.        Left: No inguinal and no supraclavicular adenopathy present.   Neurological: She is alert and oriented to person, place, and time. No cranial nerve deficit.   Bilateral lower extremity strength, 4+/5   Skin: Skin is warm and dry. No rash noted.   Mild erythema involving the palms of the hands.  Mild degree of skin cracking with minor peeling   Psychiatric: She has a normal mood and affect. Her behavior is normal.       RECENT LABS:  Hematology WBC   Date Value Ref Range Status   07/29/2019 7.78 3.40 - 10.80 10*3/mm3 Final     RBC    Date Value Ref Range Status   07/29/2019 4.12 3.77 - 5.28 10*6/mm3 Final     Hemoglobin   Date Value Ref Range Status   07/29/2019 13.1 12.0 - 15.9 g/dL Final     Hematocrit   Date Value Ref Range Status   07/29/2019 40.1 34.0 - 46.6 % Final     Platelets   Date Value Ref Range Status   07/29/2019 265 140 - 450 10*3/mm3 Final        Lab Results   Component Value Date    GLUCOSE 128 (H) 07/29/2019    BUN 18 07/29/2019    CREATININE 1.09 (H) 07/29/2019    EGFRIFNONA 52 (L) 07/29/2019    EGFRIFAFRI 80 09/25/2017    BCR 16.5 07/29/2019    K 4.6 07/29/2019    CO2 28.4 07/29/2019    CALCIUM 9.2 07/29/2019    PROTENTOTREF 6.4 09/25/2017    ALBUMIN 4.10 07/29/2019    LABIL2 2.2 09/25/2017    AST 17 07/29/2019    ALT 15 07/29/2019     CT chest abdomen pelvis 7/23/2019: I did personally review CT images today and reviewed the images in the exam room with the patient.  IMPRESSION:  1. The cluster of nodules at the right lower lobe and the reticular  nodular opacity at the right upper lobe are stable. There are new small  groundglass opacities on the right as described. The etiology is  indeterminate. Conservative surveillance is recommended.  2. There is no convincing evidence for visceral metastases within the  abdomen or pelvis. The 1.8 cm subcutaneous nodule at the lateral aspect  of the right midchest is stable. No new abnormality is seen.      Bone scan 7/23/2019:  IMPRESSION:  Stable bone scan.     Records reviewed from recent left foot surgery as noted above and below      Assessment/Plan      1. Previous Stage Ib ([id number]) right breast cancer:  · Diagnosed May 2010 with excisional biopsy for invasive ductal carcinoma, 1.3 cm, grade 2, ER 90%, ID 80%, HER-2/peter negative (1+ IHC).    · Subsequent right mastectomy in July 2010 with no residual breast malignancy, 1/5 sentinel lymph node with micrometastasis (0.25 mm).    · Treated in the [facility] system with adjuvant AC ×4 cycles in 2010 (no taxanes administered due to  underlying Charcot-Saloni-Tooth with peripheral neuropathy).    · Adjuvant Femara (postmenopausal) initiated October 2010 with plan to treat ×10 years.    · Genetic testing reportedly negative.    · Developed osteopenia treated with Prolia beginning 2/27/13. Subsequently discontinued due to identification of metastatic disease.  2. Recurrent/metastatic disease identified 10/8/17:  · Disease involving thoracic spine with cord compression at T6, lumbosacral involvement, sternal and right sternoclavicular involvement.    · Femara discontinued in 10/2017.    · Radiation administered (in the [facility] system) to the thoracic spine beginning 10/19/17 treating T3-T9 to a dose of 24 gray in 6 fractions.  · Evaluation with MRI 12/8/17 showing persistent T6 cord compression with persistent neurologic compromise requiring surgical treatment 12/11/17 with T6 laminectomy/corpectomy and T3-T9 fusion.  Pathology with metastatic carcinoma of breast origin, ER negative, [place] negative, HER-2/peter negative (1+ IHC).  · Additional staging evaluation 12/8/17 with no evidence of visceral metastatic disease, bone scan showing involvement of thoracic spine, sternum, left humerus, mid frontal bone.  No plane film correlate of left humerus lesion.  MRI lumbar spine with small intradural L3 metastasis.  CA 15-3 12/6/17- 17.  · Palliative radiation therapy to L3 dural metastasis and left humerus initiated 1/15/18 (10 fractions), completed 1/26/18.  · Hypercalcemia of malignancy with calcium in the 10-11 range.  · Initiation of monthly Xgeva 1/23/18.  · Baseline CT scan 1/30/18 with no evidence of visceral involvement.  Cluster of nodular opacities in the right lower lobe suspected to be infectious or related to bronchiolitis. Bone scan 1/30/18 showed postsurgical change in the thoracic spine, stable uptake in the frontal bone, no new areas of disease.  · Initiation of palliative oral single agent Xeloda 2/7/18 2000 mg a.m., 1500 mg p.m. for 14/21  days.   · Following 3 cycles xeloda, bone scan 4/4/18 showed no change from the prior study.  CT scan 4/4/18 showed a small pericardial effusion of unclear significance as well as a subcutaneous nodule in the right lateral chest wall.  Subsequent evaluation with echocardiogram 4/17/18 showed no evidence of pericardial effusion.  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  We discussed the possibility of surgical excision of the right subcutaneous chest wall lesion for more definitive diagnosis.  Reviewed previous CT images dating back to 12/8/17 and the lesion was present even at that time measuring around 1.7 cm although not commented on in the radiology report.  As this appears to be an indolent low-grade process unrelated to her breast cancer, recommendee foregoing surgical excision at this time and monitoring this area on future scans.  The patient agreed.    · Following 6 cycles of Xeloda, CT 6/6/18  showed stable findings, no evidence of progressive disease.  There was a comment regarding subcutaneous abnormality in the anterior abdominal wall and this was related to Lovenox injection sites.  Bone scan 6/6/18 showed no interval change.   · CT scan and bone scan 8/13/18 following 9 cycles of Xeloda showed stable findings with no evidence of significant visceral metastases.  Her bone lesions appear stable on bone scan.  The spindle cell neoplasm in her right chest wall actually decreased in size from 2 cm down to 1.6 cm.    · The patient experienced some symptoms of diarrhea, anorexia, generalized weakness during cycle 9 Xeloda it was unclear whether this was related to a viral gastroenteritis or toxicity from treatment.  Symptoms recurred during cycle 10 and treatment was cut short by 2 days.  Symptoms attributed to Xeloda.  With cycle 11, dose and schedule altered to  1500 mg twice daily for 7 days on followed by 7 days off .  · CT scan and bone scan from 10/31/18 following 12 cycles of Xeloda showed stable disease.    · CT scans and bone scan 1/21/19 following 15 cycles of Xeloda showed stable findings.  · CT scan and bone scan following 18 cycles of Xeloda 4/24/2019 showed stable findings.    · The patient returns today continuing on oral Xeloda 1500 mg twice daily 7 days on followed by 7 days off with 3-month interval scans to review.  We are slightly off schedule given her recent surgery.  She did hold treatment beginning 7/8/2019, underwent surgery on 7/15/2019 for resection of the left fifth metatarsal which was a minor procedure.  I had intended for her to hold treatment the week after as well however she resumed treatment on 7/17/2019 and received a full week.  She has done well and fortunately her wound is healing without difficulty.  Therefore we will continue on her routine schedule with Xeloda.  We did review her scans today.  Bone scan 7/23/2019 with no changes.  CT scan 7/23/2019 with stable bony lesions and stable cluster of nodules right lower lobe.  There were new small reticulonodular groundglass opacity in the right lower lobe as well as right upper lobe measuring 1.5 and 1.7 cm respectively.  These have the appearance of inflammatory change.  The patient is asymptomatic from a respiratory standpoint.  We will continue to monitor these on follow-up scans at a 2 to 3-month interval.  We will discuss timing of this further when she returns in 4 weeks for Xgeva again.  She is due for Xgeva today.  For now it appears that she has stable disease on treatment.  3. Right pulmonary embolism:  · Diagnosed on CT angiogram 10/21/17 involving small right lower lobe pulmonary artery.  Lower extremity Dopplers negative.  · Bilateral lower extremity Dopplers negative again 12/5/17.  · Received chronic Lovenox 1 mg/kg twice per day, transition to oral Eliquis in February 2019,  continuing on Eliquis 5 mg twice daily.  · The patient did hold Eliquis recently 2 days before her recent surgery and resumed the day after without bleeding issues.  4. Cancer related pain:  · Previously receiving Duragesic 50 µg patch every 72 hours along with Dilaudid 4 mg as needed for breakthrough pain  · The patient's pain improved over time and she was able to discontinue both Duragesic and Dilaudid in the interval.  · Patient does take occasional Flexeril at bedtime due to back spasm/pain when she has been more active.  · The patient does have some occasional aggravation of her chronic back pain with significant rehabilitation activity and requires an occasional dose of Dilaudid.  No narcotic requirements recently.  5. Chemotherapy-induced diarrhea with subsequent C. difficile colitis:  · The patient developed initial diarrhea related to Xeloda at regular dosing.  · Symptoms improved on reduced dose Xeloda  · Flare of symptoms in October 2018 with apparent finding of C. difficile colitis by GI, treated with course of oral vancomycin with improvement in symptoms.  · Patient notes minimal intermittent diarrhea on Xeloda requiring occasional dosing of Imodium.  6. Traumatic left tibia/fibular fracture:  · Status post ORIF 12/6/17  · Specimen was sent for pathologic review, negative for evidence of malignancy  7. Hypercalcemia:  · Suspect hypercalcemia of malignancy, calcium in  10-11 range previously.  · Calcium normalized following initiation of monthly Xgeva on 1/23/18.  8. Chemotherapy-induced mucositis:  · Patient had a minimal degree of mucositis with cycle 2.  The patient has magic mouthwash to use as needed.  No subsequent mucositis.  9. Recurrent UTI, bladder wall thickening on CT:  · Patient had an enterococcal UTI on 3/2/18 sensitive to nitrofurantoin and received treatment, unclear how long.  · Recurrent UTI 3/20/18 with urine culture growing Klebsiella, initially treated with nitrofurantoin,  transitioned to Levaquin.  · CT 4/4/18 with diffuse bladder wall thickening with increased nodular thickening at the left base.  Referral to urogynecology Dr. May Johnson.  She was placed on a prophylactic dose of trimethoprim 100 mg daily, bladder wall thickening felt to be related to recent recurrent urinary tract infections.  · Patient with urinary symptoms, treated with course of Macrobid at the end of December 2018, urine culture however was negative 12/31/18.  · Patient today has recurrent symptoms of urinary frequency, dysuria, back pain.  We are obtaining urinalysis and culture today.  10.   Mobility:  · The patient underwent an intensive course of rehabilitation at [facility].  She graduated from her outpatient course November 2018.  · Overall the patient has improved dramatically in terms of mobility, now walking around 1 mile per day without assistance.  · Patient with minimal setback in mobility with recent left fifth metatarsal resection but is now back to ambulating with her cane.  She has a boot in place.  11.  Depression:  · The patient weaned herself off of Cymbalta and reports that her symptoms remain stable.  12. 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  Symptoms did improve and she continued on the same dose.    · Interestingly, symptoms are very manageable and mild currently with only slight erythema and some skin cracking in the hands.  13. Elevated liver function studies:  · Liver function studies increased 8/20/19 with ALT 98, AST 70, normal total bilirubin.  · Negative viral hepatitis A, B, and C panel 8/23/18  · Likely related to hepatic steatosis seen on CT, no definitive evidence of metastatic liver lesions.   · Subsequent improvement in LFTs  · Today, LFTs are normal  14. Chemotherapy induced  leukopenia:  · Patient with mild leukopenia secondary to Xeloda, subsequently improved with WBC today 7.78 and normal differential.  15. GERD:  · Prilosec 40 mg twice daily.  16. Insomnia:  · Patient with prior paradoxical reaction to Benadryl  · Improved on temazepam 15 mg nightly as needed.  17. Health maintenance:  · Patient notes that she has a history of colon polyps and is due for a follow-up colonoscopy on 8/13/2019.  We did discuss there is no necessity to pursue colonoscopy in the setting of her metastatic breast cancer however she does wish to proceed and is scheduled for colonoscopy on 8/13/2019 with Dr. Ocasio    Plan:  1. Continue oral Xeloda 1500 mg twice a day 7 days on followed by 7 days off  2. Monthly Xgeva today  3. Continue Eliquis 5 mg twice daily.  4. Continue use of emollient cream on the hands and feet and continue to wear socks and gloves at night  5. Urinalysis and urine culture today.  We will contact the patient with any need for antibiotic therapy.  6. MD visit in 4 weeks with CBC, CMP, magnesium, phosphorus.  Patient will be due for Xgeva.  We will discuss timing of follow-up scans (2 to 3-month interval from current studies).    Patient continuing on high risk medication requiring intensive monitoring.  Reviewed CT scan, bone scan today including CT images.  New problem of suspected urinary tract infection with additional evaluation performed including urinalysis and urine culture.

## 2019-07-30 ENCOUNTER — TELEPHONE (OUTPATIENT)
Dept: ONCOLOGY | Facility: HOSPITAL | Age: 59
End: 2019-07-30

## 2019-07-30 RX ORDER — NITROFURANTOIN 25; 75 MG/1; MG/1
100 CAPSULE ORAL 2 TIMES DAILY
Qty: 10 CAPSULE | Refills: 0 | Status: SHIPPED | OUTPATIENT
Start: 2019-07-30 | End: 2019-11-08

## 2019-07-30 NOTE — TELEPHONE ENCOUNTER
Called and informed pt. Pt v/u. Script sent to pharmacy of pt's choice.     ----- Message from Ubaldo Hancock Jr., MD sent at 7/29/2019 11:54 PM EDT -----  Please prescribe macrobid 100mg bid x 5 days and notify patient. Urine culture pending.  ----- Message -----  From: Lab, Background User  Sent: 7/29/2019  11:03 AM  To: Ubaldo Hancock Jr., MD

## 2019-07-31 ENCOUNTER — TELEPHONE (OUTPATIENT)
Dept: ONCOLOGY | Facility: HOSPITAL | Age: 59
End: 2019-07-31

## 2019-07-31 LAB — BACTERIA SPEC AEROBE CULT: ABNORMAL

## 2019-07-31 RX ORDER — LEVOFLOXACIN 500 MG/1
500 TABLET, FILM COATED ORAL DAILY
Qty: 7 TABLET | Refills: 1 | Status: SHIPPED | OUTPATIENT
Start: 2019-07-31 | End: 2019-09-04

## 2019-07-31 NOTE — TELEPHONE ENCOUNTER
Received urine cultures.  Macrobid that was called in yesterday will not work.  Per Tiffany ARELLANO, levaquin 500 for 7 days sent to pharmacy with 1 refill.  Pt understands to notify us if problem does not resolve.

## 2019-08-26 ENCOUNTER — LAB (OUTPATIENT)
Dept: OTHER | Facility: HOSPITAL | Age: 59
End: 2019-08-26

## 2019-08-26 ENCOUNTER — OFFICE VISIT (OUTPATIENT)
Dept: ONCOLOGY | Facility: CLINIC | Age: 59
End: 2019-08-26

## 2019-08-26 ENCOUNTER — INFUSION (OUTPATIENT)
Dept: ONCOLOGY | Facility: HOSPITAL | Age: 59
End: 2019-08-26

## 2019-08-26 VITALS
OXYGEN SATURATION: 98 % | BODY MASS INDEX: 36.87 KG/M2 | TEMPERATURE: 98.2 F | SYSTOLIC BLOOD PRESSURE: 128 MMHG | WEIGHT: 195.3 LBS | RESPIRATION RATE: 16 BRPM | HEIGHT: 61 IN | DIASTOLIC BLOOD PRESSURE: 84 MMHG | HEART RATE: 94 BPM

## 2019-08-26 DIAGNOSIS — Z17.1 MALIGNANT NEOPLASM OF OVERLAPPING SITES OF RIGHT BREAST IN FEMALE, ESTROGEN RECEPTOR NEGATIVE (HCC): ICD-10-CM

## 2019-08-26 DIAGNOSIS — C50.811 MALIGNANT NEOPLASM OF OVERLAPPING SITES OF RIGHT BREAST IN FEMALE, ESTROGEN RECEPTOR NEGATIVE (HCC): ICD-10-CM

## 2019-08-26 DIAGNOSIS — Z17.1 MALIGNANT NEOPLASM OF OVERLAPPING SITES OF RIGHT BREAST IN FEMALE, ESTROGEN RECEPTOR NEGATIVE (HCC): Primary | ICD-10-CM

## 2019-08-26 DIAGNOSIS — C50.811 MALIGNANT NEOPLASM OF OVERLAPPING SITES OF RIGHT BREAST IN FEMALE, ESTROGEN RECEPTOR NEGATIVE (HCC): Primary | ICD-10-CM

## 2019-08-26 LAB
ALBUMIN SERPL-MCNC: 4.2 G/DL (ref 3.5–5.2)
ALBUMIN/GLOB SERPL: 1.8 G/DL
ALP SERPL-CCNC: 58 U/L (ref 39–117)
ALT SERPL W P-5'-P-CCNC: 14 U/L (ref 1–33)
ANION GAP SERPL CALCULATED.3IONS-SCNC: 12 MMOL/L (ref 5–15)
AST SERPL-CCNC: 16 U/L (ref 1–32)
BASOPHILS # BLD AUTO: 0.05 10*3/MM3 (ref 0–0.2)
BASOPHILS NFR BLD AUTO: 1.2 % (ref 0–1.5)
BILIRUB SERPL-MCNC: 0.3 MG/DL (ref 0.1–1.2)
BUN BLD-MCNC: 22 MG/DL (ref 6–20)
BUN/CREAT SERPL: 20.8 (ref 7–25)
CALCIUM SPEC-SCNC: 9.4 MG/DL (ref 8.6–10.5)
CHLORIDE SERPL-SCNC: 105 MMOL/L (ref 98–107)
CO2 SERPL-SCNC: 26 MMOL/L (ref 22–29)
CREAT BLD-MCNC: 1.06 MG/DL (ref 0.57–1)
DEPRECATED RDW RBC AUTO: 52.2 FL (ref 37–54)
EOSINOPHIL # BLD AUTO: 0.18 10*3/MM3 (ref 0–0.4)
EOSINOPHIL NFR BLD AUTO: 4.2 % (ref 0.3–6.2)
ERYTHROCYTE [DISTWIDTH] IN BLOOD BY AUTOMATED COUNT: 15.6 % (ref 12.3–15.4)
GFR SERPL CREATININE-BSD FRML MDRD: 53 ML/MIN/1.73
GLOBULIN UR ELPH-MCNC: 2.3 GM/DL
GLUCOSE BLD-MCNC: 144 MG/DL (ref 65–99)
HCT VFR BLD AUTO: 39.9 % (ref 34–46.6)
HGB BLD-MCNC: 13.1 G/DL (ref 12–15.9)
IMM GRANULOCYTES # BLD AUTO: 0.01 10*3/MM3 (ref 0–0.05)
IMM GRANULOCYTES NFR BLD AUTO: 0.2 % (ref 0–0.5)
LYMPHOCYTES # BLD AUTO: 1.69 10*3/MM3 (ref 0.7–3.1)
LYMPHOCYTES NFR BLD AUTO: 39.3 % (ref 19.6–45.3)
MAGNESIUM SERPL-MCNC: 1.9 MG/DL (ref 1.6–2.6)
MCH RBC QN AUTO: 31.6 PG (ref 26.6–33)
MCHC RBC AUTO-ENTMCNC: 32.8 G/DL (ref 31.5–35.7)
MCV RBC AUTO: 96.1 FL (ref 79–97)
MONOCYTES # BLD AUTO: 0.37 10*3/MM3 (ref 0.1–0.9)
MONOCYTES NFR BLD AUTO: 8.6 % (ref 5–12)
NEUTROPHILS # BLD AUTO: 2 10*3/MM3 (ref 1.7–7)
NEUTROPHILS NFR BLD AUTO: 46.5 % (ref 42.7–76)
NRBC BLD AUTO-RTO: 0 /100 WBC (ref 0–0.2)
PHOSPHATE SERPL-MCNC: 4.5 MG/DL (ref 2.5–4.5)
PLATELET # BLD AUTO: 255 10*3/MM3 (ref 140–450)
PMV BLD AUTO: 10.6 FL (ref 6–12)
POTASSIUM BLD-SCNC: 4.5 MMOL/L (ref 3.5–5.2)
PROT SERPL-MCNC: 6.5 G/DL (ref 6–8.5)
RBC # BLD AUTO: 4.15 10*6/MM3 (ref 3.77–5.28)
SODIUM BLD-SCNC: 143 MMOL/L (ref 136–145)
WBC NRBC COR # BLD: 4.3 10*3/MM3 (ref 3.4–10.8)

## 2019-08-26 PROCEDURE — 36415 COLL VENOUS BLD VENIPUNCTURE: CPT

## 2019-08-26 PROCEDURE — 99214 OFFICE O/P EST MOD 30 MIN: CPT | Performed by: INTERNAL MEDICINE

## 2019-08-26 PROCEDURE — 85025 COMPLETE CBC W/AUTO DIFF WBC: CPT | Performed by: INTERNAL MEDICINE

## 2019-08-26 PROCEDURE — 83735 ASSAY OF MAGNESIUM: CPT | Performed by: INTERNAL MEDICINE

## 2019-08-26 PROCEDURE — 84100 ASSAY OF PHOSPHORUS: CPT | Performed by: INTERNAL MEDICINE

## 2019-08-26 PROCEDURE — 96372 THER/PROPH/DIAG INJ SC/IM: CPT | Performed by: INTERNAL MEDICINE

## 2019-08-26 PROCEDURE — 25010000002 DENOSUMAB 120 MG/1.7ML SOLUTION: Performed by: INTERNAL MEDICINE

## 2019-08-26 PROCEDURE — 80053 COMPREHEN METABOLIC PANEL: CPT | Performed by: INTERNAL MEDICINE

## 2019-08-26 RX ORDER — TEMAZEPAM 15 MG/1
15 CAPSULE ORAL NIGHTLY PRN
Qty: 30 CAPSULE | Refills: 1 | Status: SHIPPED | OUTPATIENT
Start: 2019-08-26 | End: 2019-11-05 | Stop reason: SDUPTHER

## 2019-08-26 RX ADMIN — DENOSUMAB 120 MG: 120 INJECTION SUBCUTANEOUS at 09:24

## 2019-08-26 NOTE — PROGRESS NOTES
Subjective .     REASONS FOR FOLLOWUP:    1. Stage Ib ([id number]) right breast cancer: Diagnosed May 2010 with excisional biopsy for invasive ductal carcinoma, 1.3 cm, grade 2, ER 90%, TN 80%, HER-2/peter negative (1+ IHC).  Subsequent right mastectomy in July 2010 with no residual breast malignancy, 1/5 sentinel lymph node with micrometastasis (0.25 mm).  Treated in the [facility] system with adjuvant AC ×4 cycles in 2010 (no taxanes administered due to underlying Charcot-Saloni-Tooth with peripheral neuropathy).  Adjuvant Femara (postmenopausal) initiated October 2010 with plan to treat ×10 years.  Genetic testing reportedly negative.  Developed osteopenia treated with Prolia beginning 2/27/13.  2. Recurrent/metastatic disease identified 10/8/17 involving thoracic spine with cord compression at T6, lumbosacral involvement, sternal and right sternoclavicular involvement.  Femara discontinued.  Radiation administered (in the [facility] system) to the thoracic spine beginning 10/19/17 treating T3-T9 to a dose of 24 gray in 6 fractions.  3. Evaluation with MRI 12/8/17 showing persistent T6 cord compression with persistent neurologic compromise requiring surgical treatment 12/11/17 with T6 laminectomy/corpectomy and T3-T9 fusion.  Pathology with metastatic carcinoma of breast origin, ER negative, TN negative, HER-2/peter negative (1+ IHC).  4. Right pulmonary embolism 10/21/17 treated with Lovenox 1 mg/kg (100 mg) every 12 hours.  No evidence of DVT.  Lovenox held due to right gluteus hematoma 3/23/18 through 4/6/18.  Transitioned to oral Eliquis 5mg bid 1/28/19 (as of 2/25/19, patient still using previous supply of Lovenox).  5. Cancer related pain previously on Duragesic 50 µg patch every 72 hours and Dilaudid 4 mg as needed for breakthrough pain.  Narcotics subsequently discontinued with improvement in pain in January 2018.  6. Radiation therapy to L3 dural metastasis and left humerus initiated 1/15/18 (10 fractions),  completed 1/26/18  7. Monthly Xgeva initiated 1/23/18  8. Mild hypercalcemia of malignancy  9. Initiation of palliative oral single agent Xeloda 2/7/18 (2000 mg a.m., 1500 mg p.m. for 14/21 days).  10. 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  In reviewing prior CT scans, has been present for some time.  Monitor for now, if it enlarges consider surgical excision.  11. Cumulative side effects from Xeloda with fatigue, anorexia, diarrhea, increased tearing.  Alteration in dose/schedule with cycle 11 to 1500 mg twice a day for 7 days on followed by 7 days off.    HISTORY OF PRESENT ILLNESS:  The patient is a 58 y.o. year old female who is here for follow-up with the above-mentioned history.    History of Present Illness the patient returns today in follow-up continuing on oral Xeloda and also continuing on monthly Xgeva that is due today with laboratory studies to review.  In the interval, the patient has done quite well.  She notes that her surgical incision healed very well on her left foot.  In fact, she reports that her hand-foot symptoms are overall improved from Xeloda.  She continues to use emollient cream frequently.  She has been fairly active recently, walking on a regular basis and riding a bicycle.  She notes a normal appetite.  She did have a urinary tract infection at the last visit with Klebsiella and received a course of Macrobid with resolution of her symptoms.  She does note a recent mild nonproductive cough, denies any shortness of breath.  She does note that the cough is usually worse at night when lying down.  She does feel that reflux symptoms may be slightly increased recently.  She is continuing on omeprazole over-the-counter dosing at 20 mg daily, previously was on 40 mg daily.  She does continue to use temazepam at night  and feels that this has helped significantly.  She continues on anticoagulation with Eliquis and is not experiencing any bleeding issues.    Past Medical History:   Diagnosis Date   • Acute pulmonary embolism, cancer-related 10/2017   • Allergic rhinitis    • Arthritis     Right knee; left shoulder   • Cancer (CMS/HCC) 2010    Right breast   • Cancer (CMS/[facility]) 10/2017    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   • Charcot-Saloni-Tooth disease    • CPAP (continuous positive airway pressure) dependence    • GERD (gastroesophageal reflux disease)    • H/O Colon polyps    • H/O Uterine fibroid    • Heart murmur    • Hyperlipidemia    • Hypertension    • PONV (postoperative nausea and vomiting)      Past Surgical History:   Procedure Laterality Date   • BLADDER SURGERY      Bladder lift   • BREAST RECONSTRUCTION, BREAST TISSUE EXPANDER INSERTION Right    • BREAST SURGERY Right 2010    Mastectomy   •  SECTION  ,    • CHOLECYSTECTOMY     • COLONOSCOPY     • HERNIA REPAIR  2015    [facility]   • HYSTERECTOMY      Partial   • KNEE SURGERY Right    • LEG SURGERY Left     Broken   • MASTECTOMY Right    • VT TREAT TIBIAL SHAFT FX, INTRAMED IMPLANT Left 2017    Procedure: TIBIA INTRAMEDULLARY NAIL/DON INSERTION;  Surgeon: Romero Shanks MD;  Location: [facility];  Service: Orthopedics   • THORACIC DECOMPRESSION POSTERIOR FUSION WITH INSTRUMENTATION N/A 2017    Procedure: T6 costotransversectomy and decompression with T3 to  T9 posterior spinal [facility];  Surgeon: Quan Nayak MD;  Location: [facility];  Service:        ONCOLOGIC HISTORY:  (History from previous dates can be found in the separate document.)    Current Outpatient Medications on File Prior to Visit   Medication Sig Dispense Refill   • acetaminophen (TYLENOL) 500 MG tablet Take 500 mg by mouth Every 6 (Six) Hours As Needed for Mild Pain .     • apixaban  (ELIQUIS) 5 MG tablet tablet Take 1 tablet by mouth Every 12 (Twelve) Hours. 60 tablet 5   • calcium carbonate (OS-CARY) 600 MG tablet Take 600 mg by mouth 2 (Two) Times a Day With Meals.     • capecitabine (XELODA) 500 MG chemo tablet Take 3 tabs (1500 mg) po twice a day for 7 days on then 7 days off. 84 tablet 3   • cholecalciferol (VITAMIN D3) 1000 units tablet Take 1,000 Units by mouth Daily.     • diazePAM (VALIUM) 10 MG tablet Take 1 tablet by mouth Every 12 (Twelve) Hours As Needed for Anxiety. 10 tablet 1   • diphenoxylate-atropine (LOMOTIL) 2.5-0.025 MG per tablet Take 1 tablet by mouth 4 (Four) Times a Day As Needed for Diarrhea. 60 tablet 2   • FLONASE ALLERGY RELIEF 50 MCG/ACT nasal spray 2 sprays into each nostril daily.  11   • gabapentin (NEURONTIN) 300 MG capsule Take 1 capsule by mouth Daily. 90 capsule 3   • mesalamine (LIALDA) 1.2 g EC tablet TAKE 1 TABLET BY MOUTH TWICE DAILY 180 tablet 0   • nitrofurantoin, macrocrystal-monohydrate, (MACROBID) 100 MG capsule Take 1 capsule by mouth 2 (Two) Times a Day. 10 capsule 0   • omeprazole (PriLOSEC) 40 MG capsule TAKE 1 CAPSULE DAILY 90 capsule 2   • phenazopyridine (PYRIDIUM) 200 MG tablet Take 200 mg by mouth 3 (Three) Times a Day As Needed for bladder spasms.     • prochlorperazine (COMPAZINE) 10 MG tablet Take 1 tablet by mouth Every 6 (Six) Hours As Needed for Nausea or Vomiting. 30 tablet 0   • sennosides-docusate sodium (SENOKOT-S) 8.6-50 MG tablet Take 2 tablets by mouth 2 (Two) Times a Day. 90 tablet 2   • traMADol (ULTRAM) 50 MG tablet Take 1 tablet by mouth Every 8 (Eight) Hours As Needed for Moderate Pain . 90 tablet 4   • trimethoprim (TRIMPEX) 100 MG tablet Take 100 mg by mouth Daily.  2   • [DISCONTINUED] temazepam (RESTORIL) 15 MG capsule Take 1 capsule by mouth At Night As Needed for Sleep. 30 capsule 1   • levoFLOXacin (LEVAQUIN) 500 MG tablet Take 1 tablet by mouth Daily. 7 tablet 1   • ondansetron ODT (ZOFRAN-ODT) 8 MG disintegrating  tablet Take 1 tablet by mouth Every 8 (Eight) Hours As Needed for Nausea or Vomiting. 30 tablet 1   • Tuberculin PPD (APLISOL ID) Inject  into the skin.     • Unable to find SWISH AND SPIT 5 ML PO Q 2 H PRN  0     No current facility-administered medications on file prior to visit.        ALLERGIES:     Allergies   Allergen Reactions   • Hydrocodone Nausea Only   • Morphine And Related Nausea And Vomiting     Social History     Socioeconomic History   • Marital status:      Spouse name: Murray   • Number of children: 3   • Years of education: College   • Highest education level: Not on file   Occupational History     Employer: [employer]     Employer: RETIRED   Tobacco Use   • Smoking status: Never Smoker   • Smokeless tobacco: Never Used   Substance and Sexual Activity   • Alcohol use: Yes     Comment: social   • Drug use: No   • Sexual activity: Defer     Family History   Problem Relation Age of Onset   • Heart disease Mother    • Hyperlipidemia Mother    • Hypertension Mother    • Diabetes Father    • Charcot-Saloni-Tooth disease Father    • Heart disease Father    • Hypertension Father    • Heart failure Father    • Diabetes Sister    • Heart disease Sister    • Hypertension Sister    • Heart disease Maternal Aunt    • Scoliosis Maternal Aunt    • Heart disease Maternal Uncle    • Diabetes Maternal Grandmother    • Heart disease Maternal Grandmother    • Hypertension Maternal Grandmother    • Uterine cancer Maternal Grandmother    • Heart disease Maternal Grandfather      Review of Systems   Constitutional: Positive for fatigue. Negative for activity change, appetite change, fever and unexpected weight change.   HENT: Negative for congestion, mouth sores, nosebleeds, sore throat and voice change.    Eyes: Negative for discharge.   Respiratory: Positive for cough. Negative for shortness of breath and wheezing.    Cardiovascular: Negative for chest pain, palpitations and leg swelling.   Gastrointestinal:  Negative for abdominal distention, abdominal pain, blood in stool, constipation, diarrhea, nausea and vomiting.   Endocrine: Negative for cold intolerance and heat intolerance.   Genitourinary: Negative for difficulty urinating, dysuria, flank pain, frequency, hematuria and urgency.   Musculoskeletal: Positive for arthralgias. Negative for back pain, joint swelling and myalgias.   Skin: Positive for rash. Negative for wound.   Neurological: Negative for dizziness, syncope, weakness, light-headedness, numbness and headaches.   Hematological: Negative for adenopathy. Does not bruise/bleed easily.   Psychiatric/Behavioral: Negative for confusion and sleep disturbance. The patient is not nervous/anxious.          Objective      Vitals:    08/26/19 0951   BP: 128/84   Pulse: 94   Resp: 16   Temp: 98.2 °F (36.8 °C)   SpO2: 98%      Current Status 8/26/2019   ECOG score 0   Pain 0/10    Physical Exam   Constitutional: She is oriented to person, place, and time. She appears well-developed and well-nourished.   HENT:   Mouth/Throat: Oropharynx is clear and moist.   Eyes: Conjunctivae are normal.   Neck: No thyromegaly present.   Cardiovascular: Normal rate and regular rhythm. Exam reveals no gallop and no friction rub.   No murmur heard.  Pulmonary/Chest: Breath sounds normal. No respiratory distress.   Abdominal: Soft. Bowel sounds are normal. She exhibits no distension. There is no tenderness.   Musculoskeletal: She exhibits no edema.   Lower extremity braces in place.  No significant lower extremity edema   Lymphadenopathy:        Head (right side): No submandibular adenopathy present.     She has no cervical adenopathy.     She has no axillary adenopathy.        Right: No inguinal and no supraclavicular adenopathy present.        Left: No inguinal and no supraclavicular adenopathy present.   Neurological: She is alert and oriented to person, place, and time. No cranial nerve deficit.   Bilateral lower extremity strength,  4+/5   Skin: Skin is warm and dry. No rash noted.   Mild erythema involving the palms of the hands.  Mild degree of skin cracking with minor peeling   Psychiatric: She has a normal mood and affect. Her behavior is normal.       RECENT LABS:  Hematology WBC   Date Value Ref Range Status   08/26/2019 4.30 3.40 - 10.80 10*3/mm3 Final     RBC   Date Value Ref Range Status   08/26/2019 4.15 3.77 - 5.28 10*6/mm3 Final     Hemoglobin   Date Value Ref Range Status   08/26/2019 13.1 12.0 - 15.9 g/dL Final     Hematocrit   Date Value Ref Range Status   08/26/2019 39.9 34.0 - 46.6 % Final     Platelets   Date Value Ref Range Status   08/26/2019 255 140 - 450 10*3/mm3 Final        Lab Results   Component Value Date    GLUCOSE 144 (H) 08/26/2019    BUN 22 (H) 08/26/2019    CREATININE 1.06 (H) 08/26/2019    EGFRIFNONA 53 (L) 08/26/2019    EGFRIFAFRI 80 09/25/2017    BCR 20.8 08/26/2019    K 4.5 08/26/2019    CO2 26.0 08/26/2019    CALCIUM 9.4 08/26/2019    PROTENTOTREF 6.4 09/25/2017    ALBUMIN 4.20 08/26/2019    LABIL2 2.2 09/25/2017    AST 16 08/26/2019    ALT 14 08/26/2019         Assessment/Plan      1. Previous Stage Ib ([id number]) right breast cancer:  · Diagnosed May 2010 with excisional biopsy for invasive ductal carcinoma, 1.3 cm, grade 2, ER 90%, IN 80%, HER-2/peter negative (1+ IHC).    · Subsequent right mastectomy in July 2010 with no residual breast malignancy, 1/5 sentinel lymph node with micrometastasis (0.25 mm).    · Treated in the [facility] system with adjuvant AC ×4 cycles in 2010 (no taxanes administered due to underlying Charcot-Saloni-Tooth with peripheral neuropathy).    · Adjuvant Femara (postmenopausal) initiated October 2010 with plan to treat ×10 years.    · Genetic testing reportedly negative.    · Developed osteopenia treated with Prolia beginning 2/27/13. Subsequently discontinued due to identification of metastatic disease.  2. Recurrent/metastatic disease identified 10/8/17:  · Disease involving  thoracic spine with cord compression at T6, lumbosacral involvement, sternal and right sternoclavicular involvement.    · Femara discontinued in 10/2017.    · Radiation administered (in the [facility] system) to the thoracic spine beginning 10/19/17 treating T3-T9 to a dose of 24 gray in 6 fractions.  · Evaluation with MRI 12/8/17 showing persistent T6 cord compression with persistent neurologic compromise requiring surgical treatment 12/11/17 with T6 laminectomy/corpectomy and T3-T9 fusion.  Pathology with metastatic carcinoma of breast origin, ER negative, OH negative, HER-2/peter negative (1+ IHC).  · Additional staging evaluation 12/8/17 with no evidence of visceral metastatic disease, bone scan showing involvement of thoracic spine, sternum, left humerus, mid frontal bone.  No plane film correlate of left humerus lesion.  MRI lumbar spine with small intradural L3 metastasis.  CA 15-3 12/6/17- 17.  · Palliative radiation therapy to L3 dural metastasis and left humerus initiated 1/15/18 (10 fractions), completed 1/26/18.  · Hypercalcemia of malignancy with calcium in the 10-11 range.  · Initiation of monthly Xgeva 1/23/18.  · Baseline CT scan 1/30/18 with no evidence of visceral involvement.  Cluster of nodular opacities in the right lower lobe suspected to be infectious or related to bronchiolitis. Bone scan 1/30/18 showed postsurgical change in the thoracic spine, stable uptake in the frontal bone, no new areas of disease.  · Initiation of palliative oral single agent Xeloda 2/7/18 2000 mg a.m., 1500 mg p.m. for 14/21 days.   · Following 3 cycles xeloda, bone scan 4/4/18 showed no change from the prior study.  CT scan 4/4/18 showed a small pericardial effusion of unclear significance as well as a subcutaneous nodule in the right lateral chest wall.  Subsequent evaluation with echocardiogram 4/17/18 showed no evidence of pericardial effusion.  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  We discussed the possibility of surgical excision of the right subcutaneous chest wall lesion for more definitive diagnosis.  Reviewed previous CT images dating back to 12/8/17 and the lesion was present even at that time measuring around 1.7 cm although not commented on in the radiology report.  As this appears to be an indolent low-grade process unrelated to her breast cancer, recommendee foregoing surgical excision at this time and monitoring this area on future scans.  The patient agreed.    · Following 6 cycles of Xeloda, CT 6/6/18  showed stable findings, no evidence of progressive disease.  There was a comment regarding subcutaneous abnormality in the anterior abdominal wall and this was related to Lovenox injection sites.  Bone scan 6/6/18 showed no interval change.   · CT scan and bone scan 8/13/18 following 9 cycles of Xeloda showed stable findings with no evidence of significant visceral metastases.  Her bone lesions appear stable on bone scan.  The spindle cell neoplasm in her right chest wall actually decreased in size from 2 cm down to 1.6 cm.    · The patient experienced some symptoms of diarrhea, anorexia, generalized weakness during cycle 9 Xeloda it was unclear whether this was related to a viral gastroenteritis or toxicity from treatment.  Symptoms recurred during cycle 10 and treatment was cut short by 2 days.  Symptoms attributed to Xeloda.  With cycle 11, dose and schedule altered to 1500 mg twice daily for 7 days on followed by 7 days off .  · Most recent scans with bone scan 7/23/2019 showing no changes.  CT scan 7/23/2019 with stable bony lesions and stable cluster of nodules right lower lobe.  New small reticulonodular groundglass opacity in the right lower lobe as well as right upper lobe measuring 1.5 and 1.7 cm respectively.  These had the appearance of inflammatory change.   The patient was asymptomatic from a respiratory standpoint.  Today, the patient returns continuing on oral Xeloda 1500 mg twice daily 7 days on followed by 7 days off.  · She will begin a new week of Xeloda on 8/28/2019.  She is due for monthly Xgeva today.  She is continuing to tolerate treatment quite well and in fact her mild chronic hand-foot symptoms are somewhat improved currently.  She does report today a mild cough which is nonproductive.  Given her findings on CT chest 7/23/2019, I have recommended that we pursue repeat scans prior to her return visit next month with both CT and bone scan and the patient agrees.  It is possible her cough is related to reflux.  She does note that it is worse when lying down at night and she has been on a lower dose of omeprazole recently at 20 mg.  I did ask her to increase this to 20 mg twice daily temporarily to see if there is any improvement.  She will proceed with Xgeva today and return in 4 weeks to review her scans.  She will be due again for Xgeva.  3. Right pulmonary embolism:  · Diagnosed on CT angiogram 10/21/17 involving small right lower lobe pulmonary artery.  Lower extremity Dopplers negative.  · Bilateral lower extremity Dopplers negative again 12/5/17.  · Received chronic Lovenox 1 mg/kg twice per day, transition to oral Eliquis in February 2019, continuing on Eliquis 5 mg twice daily.  4. Cancer related pain:  · Previously receiving Duragesic 50 µg patch every 72 hours along with Dilaudid 4 mg as needed for breakthrough pain  · The patient's pain improved over time and she was able to discontinue both Duragesic and Dilaudid in the interval.  · Patient does take occasional Flexeril at bedtime due to back spasm/pain when she has been more active.  · The patient does have some occasional aggravation of her chronic back pain with significant rehabilitation activity and requires an occasional dose of Dilaudid.  No narcotic requirements  recently.  5. Chemotherapy-induced diarrhea with subsequent C. difficile colitis:  · The patient developed initial diarrhea related to Xeloda at regular dosing.  · Symptoms improved on reduced dose Xeloda  · Flare of symptoms in October 2018 with apparent finding of C. difficile colitis by GI, treated with course of oral vancomycin with improvement in symptoms.  · Patient notes minimal intermittent diarrhea on Xeloda requiring occasional dosing of Imodium.  6. Traumatic left tibia/fibular fracture:  · Status post ORIF 12/6/17  · Specimen was sent for pathologic review, negative for evidence of malignancy  7. Hypercalcemia:  · Suspect hypercalcemia of malignancy, calcium in  10-11 range previously.  · Calcium normalized following initiation of monthly Xgeva on 1/23/18.  8. Chemotherapy-induced mucositis:  · Patient had a minimal degree of mucositis with cycle 2.  The patient has magic mouthwash to use as needed.  No subsequent mucositis.  9. Recurrent UTI, bladder wall thickening on CT:  · Patient had an enterococcal UTI on 3/2/18 sensitive to nitrofurantoin and received treatment, unclear how long.  · Recurrent UTI 3/20/18 with urine culture growing Klebsiella, initially treated with nitrofurantoin, transitioned to Levaquin.  · CT 4/4/18 with diffuse bladder wall thickening with increased nodular thickening at the left base.  Referral to urogynecology Dr. May Johnson.  She was placed on a prophylactic dose of trimethoprim 100 mg daily, bladder wall thickening felt to be related to recent recurrent urinary tract infections.  · Patient with urinary symptoms, treated with course of Macrobid at the end of December 2018, urine culture however was negative 12/31/18.  · Patient was found to have Klebsiella UTI 7/29/2019 which was successfully treated with Macrobid with complete resolution of symptoms.  10.   Mobility:  · The patient underwent an intensive course of [facility] at [facility].  She graduated from her  outpatient course November 2018.  · Overall the patient has improved dramatically in terms of mobility, now walking around 1 mile per day without assistance.  · Patient with minimal setback in mobility with recent left fifth metatarsal resection but is now back to ambulating with her cane.  She has a boot in place.  11.  Depression:  · The patient weaned herself off of Cymbalta and reports that her symptoms remain stable.  12. 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  Symptoms did improve and she continued on the same dose.    · Interestingly, symptoms are very manageable and mild currently with only slight erythema and some skin cracking in the hands.  13. Elevated liver function studies:  · Liver function studies increased 8/20/19 with ALT 98, AST 70, normal total bilirubin.  · Negative viral hepatitis A, B, and C panel 8/23/18  · Likely related to hepatic steatosis seen on CT, no definitive evidence of metastatic liver lesions.   · Subsequent improvement in LFTs  · Today, LFTs are normal  14. Chemotherapy induced leukopenia:  · Patient with mild leukopenia secondary to Xeloda, subsequently improved with WBC today 4.3, ANC 2.0.  15. GERD:  · The patient has recently been taking over-the-counter omeprazole 20 mg daily, had previously been receiving 40 mg daily.  Question whether her current cough is related to reflux symptoms as it is somewhat worse when lying down at night.  We have asked her to increase her omeprazole dosing temporarily to 40 mg daily and see if there is any improvement.  16. Insomnia:  · Patient with prior paradoxical reaction to Benadryl  · Improved on temazepam 15 mg nightly as needed.  A refill was provided #30 with 1 additional refill.  17. Health maintenance:  · Patient notes that she has a history of colon  polyps and is due for a follow-up colonoscopy on 9/12/2019.  We did discuss there is no necessity to pursue colonoscopy in the setting of her metastatic breast cancer however she does wish to proceed and is scheduled for colonoscopy on 9/12/2019 with Dr. Ocasio    Plan:  1. Continue oral Xeloda 1500 mg twice a day 7 days on followed by 7 days off (patient will begin her next week on treatment on 8/28/2019).  2. Monthly Xgeva today  3. Continue Eliquis 5 mg twice daily.  4. Continue use of emollient cream on the hands and feet and continue to wear socks and gloves at night  5. Increase omeprazole dosing to 40 mg daily to see if there is improvement in cough  6. Refill sent for temazepam 15 mg nightly as needed, #30 with 1 additional refill.  7. Patient does wish to proceed with colonoscopy on 9/12/2019 as planned with Dr. Ocasio  8. In 3 weeks CT chest abdomen pelvis and bone scan  9. MD visit in 4 weeks with CBC, CMP, magnesium, phosphorus.  Patient will be due for Xgeva.     Patient continuing on high risk medication requiring intensive monitoring.

## 2019-08-28 ENCOUNTER — DOCUMENTATION (OUTPATIENT)
Dept: ONCOLOGY | Facility: CLINIC | Age: 59
End: 2019-08-28

## 2019-08-28 RX ORDER — CAPECITABINE 500 MG/1
TABLET, FILM COATED ORAL
Qty: 84 TABLET | Refills: 3 | Status: SHIPPED | OUTPATIENT
Start: 2019-08-28 | End: 2019-12-19 | Stop reason: SDUPTHER

## 2019-08-28 NOTE — PROGRESS NOTES
Call rec from pt-she states she has spoken with [facility] about her Xeloda and was told that she owes a copay. She has Medicare with [place] [facility]. She has not paid a copay since being on the rx.  I informed her that she should NOT have a copay as this is billed through her Medical Plan. This issue has happened to pt in the past.    I informed her that we can move her rx to [facility] and she is agreeable to this.    I have escribed the rx to [facility] for processing.

## 2019-09-04 ENCOUNTER — TELEPHONE (OUTPATIENT)
Dept: ONCOLOGY | Facility: HOSPITAL | Age: 59
End: 2019-09-04

## 2019-09-04 ENCOUNTER — TELEPHONE (OUTPATIENT)
Dept: INTERNAL MEDICINE | Facility: CLINIC | Age: 59
End: 2019-09-04

## 2019-09-04 RX ORDER — METHYLPREDNISOLONE 4 MG/1
TABLET ORAL
Qty: 1 EACH | Refills: 0 | Status: SHIPPED | OUTPATIENT
Start: 2019-09-04 | End: 2019-11-08

## 2019-09-04 RX ORDER — MESALAMINE 1.2 G/1
1.2 TABLET, DELAYED RELEASE ORAL 2 TIMES DAILY
Qty: 180 TABLET | Refills: 0 | Status: SHIPPED | OUTPATIENT
Start: 2019-09-04 | End: 2020-01-06

## 2019-09-04 NOTE — TELEPHONE ENCOUNTER
----- Message from Dali YODER Smith sent at 9/4/2019 10:19 AM EDT -----  Contact: [phone]  Pt face is swollen      Pt called stating that her face started to swell on Sunday and is getting progressively worse. She reports swelling on her eyes, cheeks, forehead. She has been on Xeloda for several months now. She is currently on her off week. She changed moisturizers and was thinking that maybe this was the cause. Denies SOA. D/W Dr. Hancock. Per Dr. Hancock, we can call pt in a medrol dose gwen. If symptoms worsen, pt needs to go to an urgent care. Informed pt and she v/u. Escribed medrol dose gwen for pt.

## 2019-09-17 ENCOUNTER — HOSPITAL ENCOUNTER (OUTPATIENT)
Dept: NUCLEAR MEDICINE | Facility: HOSPITAL | Age: 59
Discharge: HOME | End: 2019-09-17

## 2019-09-17 ENCOUNTER — HOSPITAL ENCOUNTER (OUTPATIENT)
Dept: CT IMAGING | Facility: HOSPITAL | Age: 59
Discharge: HOME | End: 2019-09-17
Admitting: INTERNAL MEDICINE

## 2019-09-17 DIAGNOSIS — C50.811 MALIGNANT NEOPLASM OF OVERLAPPING SITES OF RIGHT BREAST IN FEMALE, ESTROGEN RECEPTOR NEGATIVE (HCC): ICD-10-CM

## 2019-09-17 DIAGNOSIS — Z17.1 MALIGNANT NEOPLASM OF OVERLAPPING SITES OF RIGHT BREAST IN FEMALE, ESTROGEN RECEPTOR NEGATIVE (HCC): ICD-10-CM

## 2019-09-17 LAB — CREAT BLDA-MCNC: 1 MG/DL (ref 0.6–1.3)

## 2019-09-17 PROCEDURE — 82565 ASSAY OF CREATININE: CPT

## 2019-09-17 PROCEDURE — 71260 CT THORAX DX C+: CPT

## 2019-09-17 PROCEDURE — 25010000002 IOPAMIDOL 61 % SOLUTION: Performed by: INTERNAL MEDICINE

## 2019-09-17 PROCEDURE — 74177 CT ABD & PELVIS W/CONTRAST: CPT

## 2019-09-17 PROCEDURE — 78306 BONE IMAGING WHOLE BODY: CPT

## 2019-09-17 PROCEDURE — 0 TECHNETIUM MEDRONATE KIT: Performed by: INTERNAL MEDICINE

## 2019-09-17 PROCEDURE — A9503 TC99M MEDRONATE: HCPCS | Performed by: INTERNAL MEDICINE

## 2019-09-17 RX ORDER — TC 99M MEDRONATE 20 MG/10ML
21.5 INJECTION, POWDER, LYOPHILIZED, FOR SOLUTION INTRAVENOUS
Status: COMPLETED | OUTPATIENT
Start: 2019-09-17 | End: 2019-09-17

## 2019-09-17 RX ADMIN — Medication 21.5 MILLICURIE: at 08:45

## 2019-09-17 RX ADMIN — IOPAMIDOL 85 ML: 612 INJECTION, SOLUTION INTRAVENOUS at 09:53

## 2019-09-23 ENCOUNTER — OFFICE VISIT (OUTPATIENT)
Dept: ONCOLOGY | Facility: CLINIC | Age: 59
End: 2019-09-23

## 2019-09-23 ENCOUNTER — INFUSION (OUTPATIENT)
Dept: ONCOLOGY | Facility: HOSPITAL | Age: 59
End: 2019-09-23

## 2019-09-23 ENCOUNTER — LAB (OUTPATIENT)
Dept: OTHER | Facility: HOSPITAL | Age: 59
End: 2019-09-23

## 2019-09-23 VITALS
WEIGHT: 195 LBS | OXYGEN SATURATION: 97 % | RESPIRATION RATE: 16 BRPM | SYSTOLIC BLOOD PRESSURE: 128 MMHG | TEMPERATURE: 98.1 F | HEIGHT: 61 IN | BODY MASS INDEX: 36.82 KG/M2 | DIASTOLIC BLOOD PRESSURE: 85 MMHG | HEART RATE: 91 BPM

## 2019-09-23 DIAGNOSIS — Z17.1 MALIGNANT NEOPLASM OF OVERLAPPING SITES OF RIGHT BREAST IN FEMALE, ESTROGEN RECEPTOR NEGATIVE (HCC): ICD-10-CM

## 2019-09-23 DIAGNOSIS — C50.811 MALIGNANT NEOPLASM OF OVERLAPPING SITES OF RIGHT BREAST IN FEMALE, ESTROGEN RECEPTOR NEGATIVE (HCC): ICD-10-CM

## 2019-09-23 DIAGNOSIS — C50.811 MALIGNANT NEOPLASM OF OVERLAPPING SITES OF RIGHT BREAST IN FEMALE, ESTROGEN RECEPTOR NEGATIVE (HCC): Primary | ICD-10-CM

## 2019-09-23 DIAGNOSIS — Z17.1 MALIGNANT NEOPLASM OF OVERLAPPING SITES OF RIGHT BREAST IN FEMALE, ESTROGEN RECEPTOR NEGATIVE (HCC): Primary | ICD-10-CM

## 2019-09-23 LAB
ALBUMIN SERPL-MCNC: 4.5 G/DL (ref 3.5–5.2)
ALBUMIN/GLOB SERPL: 1.9 G/DL
ALP SERPL-CCNC: 63 U/L (ref 39–117)
ALT SERPL W P-5'-P-CCNC: 16 U/L (ref 1–33)
ANION GAP SERPL CALCULATED.3IONS-SCNC: 8.9 MMOL/L (ref 5–15)
AST SERPL-CCNC: 20 U/L (ref 1–32)
BASOPHILS # BLD AUTO: 0.04 10*3/MM3 (ref 0–0.2)
BASOPHILS NFR BLD AUTO: 0.8 % (ref 0–1.5)
BILIRUB SERPL-MCNC: 0.4 MG/DL (ref 0.1–1.2)
BUN BLD-MCNC: 23 MG/DL (ref 6–20)
BUN/CREAT SERPL: 20.7 (ref 7–25)
CALCIUM SPEC-SCNC: 9.8 MG/DL (ref 8.6–10.5)
CHLORIDE SERPL-SCNC: 104 MMOL/L (ref 98–107)
CO2 SERPL-SCNC: 29.1 MMOL/L (ref 22–29)
CREAT BLD-MCNC: 1.11 MG/DL (ref 0.57–1)
DEPRECATED RDW RBC AUTO: 56.4 FL (ref 37–54)
EOSINOPHIL # BLD AUTO: 0.27 10*3/MM3 (ref 0–0.4)
EOSINOPHIL NFR BLD AUTO: 5.2 % (ref 0.3–6.2)
ERYTHROCYTE [DISTWIDTH] IN BLOOD BY AUTOMATED COUNT: 16.5 % (ref 12.3–15.4)
GFR SERPL CREATININE-BSD FRML MDRD: 50 ML/MIN/1.73
GLOBULIN UR ELPH-MCNC: 2.4 GM/DL
GLUCOSE BLD-MCNC: 121 MG/DL (ref 65–99)
HCT VFR BLD AUTO: 40.6 % (ref 34–46.6)
HGB BLD-MCNC: 13.2 G/DL (ref 12–15.9)
IMM GRANULOCYTES # BLD AUTO: 0.02 10*3/MM3 (ref 0–0.05)
IMM GRANULOCYTES NFR BLD AUTO: 0.4 % (ref 0–0.5)
LYMPHOCYTES # BLD AUTO: 1.49 10*3/MM3 (ref 0.7–3.1)
LYMPHOCYTES NFR BLD AUTO: 28.9 % (ref 19.6–45.3)
MAGNESIUM SERPL-MCNC: 2.3 MG/DL (ref 1.6–2.6)
MCH RBC QN AUTO: 31 PG (ref 26.6–33)
MCHC RBC AUTO-ENTMCNC: 32.5 G/DL (ref 31.5–35.7)
MCV RBC AUTO: 95.3 FL (ref 79–97)
MONOCYTES # BLD AUTO: 0.47 10*3/MM3 (ref 0.1–0.9)
MONOCYTES NFR BLD AUTO: 9.1 % (ref 5–12)
NEUTROPHILS # BLD AUTO: 2.86 10*3/MM3 (ref 1.7–7)
NEUTROPHILS NFR BLD AUTO: 55.6 % (ref 42.7–76)
NRBC BLD AUTO-RTO: 0 /100 WBC (ref 0–0.2)
PHOSPHATE SERPL-MCNC: 3.6 MG/DL (ref 2.5–4.5)
PLATELET # BLD AUTO: 253 10*3/MM3 (ref 140–450)
PMV BLD AUTO: 11 FL (ref 6–12)
POTASSIUM BLD-SCNC: 4.9 MMOL/L (ref 3.5–5.2)
PROT SERPL-MCNC: 6.9 G/DL (ref 6–8.5)
RBC # BLD AUTO: 4.26 10*6/MM3 (ref 3.77–5.28)
SODIUM BLD-SCNC: 142 MMOL/L (ref 136–145)
WBC NRBC COR # BLD: 5.15 10*3/MM3 (ref 3.4–10.8)

## 2019-09-23 PROCEDURE — 80053 COMPREHEN METABOLIC PANEL: CPT | Performed by: INTERNAL MEDICINE

## 2019-09-23 PROCEDURE — 99215 OFFICE O/P EST HI 40 MIN: CPT | Performed by: INTERNAL MEDICINE

## 2019-09-23 PROCEDURE — 85025 COMPLETE CBC W/AUTO DIFF WBC: CPT | Performed by: INTERNAL MEDICINE

## 2019-09-23 PROCEDURE — 25010000002 DENOSUMAB 120 MG/1.7ML SOLUTION: Performed by: INTERNAL MEDICINE

## 2019-09-23 PROCEDURE — 36415 COLL VENOUS BLD VENIPUNCTURE: CPT

## 2019-09-23 PROCEDURE — 84100 ASSAY OF PHOSPHORUS: CPT | Performed by: INTERNAL MEDICINE

## 2019-09-23 PROCEDURE — 83735 ASSAY OF MAGNESIUM: CPT | Performed by: INTERNAL MEDICINE

## 2019-09-23 PROCEDURE — 96372 THER/PROPH/DIAG INJ SC/IM: CPT | Performed by: INTERNAL MEDICINE

## 2019-09-23 RX ORDER — SUCRALFATE 1 G/1
1 TABLET ORAL 4 TIMES DAILY
Qty: 30 TABLET | Refills: 2 | Status: SHIPPED | OUTPATIENT
Start: 2019-09-23 | End: 2020-09-14 | Stop reason: SDUPTHER

## 2019-09-23 RX ADMIN — DENOSUMAB 120 MG: 120 INJECTION SUBCUTANEOUS at 15:06

## 2019-09-23 NOTE — PROGRESS NOTES
Subjective .     REASONS FOR FOLLOWUP:    1. Stage Ib ([id number]) right breast cancer: Diagnosed May 2010 with excisional biopsy for invasive ductal carcinoma, 1.3 cm, grade 2, ER 90%, MT 80%, HER-2/peter negative (1+ IHC).  Subsequent right mastectomy in July 2010 with no residual breast malignancy, 1/5 sentinel lymph node with micrometastasis (0.25 mm).  Treated in the [facility] system with adjuvant AC ×4 cycles in 2010 (no taxanes administered due to underlying Charcot-Saloni-Tooth with peripheral neuropathy).  Adjuvant Femara (postmenopausal) initiated October 2010 with plan to treat ×10 years.  Genetic testing reportedly negative.  Developed osteopenia treated with Prolia beginning 2/27/13.  2. Recurrent/metastatic disease identified 10/8/17 involving thoracic spine with cord compression at T6, lumbosacral involvement, sternal and right sternoclavicular involvement.  Femara discontinued.  Radiation administered (in the [facility] system) to the thoracic spine beginning 10/19/17 treating T3-T9 to a dose of 24 gray in 6 fractions.  3. Evaluation with MRI 12/8/17 showing persistent T6 cord compression with persistent neurologic compromise requiring surgical treatment 12/11/17 with T6 laminectomy/corpectomy and T3-T9 fusion.  Pathology with metastatic carcinoma of breast origin, ER negative, MT negative, HER-2/peter negative (1+ IHC).  4. Right pulmonary embolism 10/21/17 treated with Lovenox 1 mg/kg (100 mg) every 12 hours.  No evidence of DVT.  Lovenox held due to right gluteus hematoma 3/23/18 through 4/6/18.  Transitioned to oral Eliquis 5mg bid 1/28/19 (as of 2/25/19, patient still using previous supply of Lovenox).  5. Cancer related pain previously on Duragesic 50 µg patch every 72 hours and Dilaudid 4 mg as needed for breakthrough pain.  Narcotics subsequently discontinued with improvement in pain in January 2018.  6. Radiation therapy to L3 dural metastasis and left humerus initiated 1/15/18 (10 fractions),  completed 1/26/18  7. Monthly Xgeva initiated 1/23/18  8. Mild hypercalcemia of malignancy  9. Initiation of palliative oral single agent Xeloda 2/7/18 (2000 mg a.m., 1500 mg p.m. for 14/21 days).  10. 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  In reviewing prior CT scans, has been present for some time.  Monitor for now, if it enlarges consider surgical excision.  11. Cumulative side effects from Xeloda with fatigue, anorexia, diarrhea, increased tearing.  Alteration in dose/schedule with cycle 11 to 1500 mg twice a day for 7 days on followed by 7 days off.    HISTORY OF PRESENT ILLNESS:  The patient is a 58 y.o. year old female who is here for follow-up with the above-mentioned history.    History of Present Illness the patient returns today in follow-up continuing on oral Xeloda and also continuing on monthly Xgeva that is due today with laboratory studies, CT scan, and bone scan to review.  In the interval, the patient reports that she has done reasonably well.  She was experiencing increase in reflux symptoms at the last visit and we increased her omeprazole dose to 40 mg daily.  Unfortunately this has not helped her symptoms.  She has continued with reflux symptoms and has noted a cough that increases at night when she lies down.  She has eaten a few times later at night which certainly exacerbate her symptoms and she is well aware that she should not do this.  She denies any bursitis related to Xeloda.  She notes that her sleep has improved somewhat on temazepam.  She had a recent episode of facial swelling which was possibly related to a moisturizing cream and she received a Medrol Dosepak with improvement.  She denies any new musculoskeletal pain today.  She continues to ambulate well with use of her canes.  Appetite is stable.   Hand-foot symptoms are stable as well with minimal erythema and some dry skin on her hands.    Past Medical History:   Diagnosis Date   • Acute pulmonary embolism, cancer-related 10/2017   • Allergic rhinitis    • Arthritis     Right knee; left shoulder   • Cancer (CMS/Formerly [facility]) 2010    Right breast   • Cancer (CMS/Formerly [facility]) 10/2017    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   • Charcot-Saloni-Tooth disease    • CPAP (continuous positive airway pressure) dependence    • GERD (gastroesophageal reflux disease)    • H/O Colon polyps    • H/O Uterine fibroid    • Heart murmur    • Hyperlipidemia    • Hypertension    • PONV (postoperative nausea and vomiting)      Past Surgical History:   Procedure Laterality Date   • BLADDER SURGERY      Bladder lift   • BREAST RECONSTRUCTION, BREAST TISSUE EXPANDER INSERTION Right 2010   • BREAST SURGERY Right 2010    Mastectomy   •  SECTION  ,    • CHOLECYSTECTOMY     • COLONOSCOPY     • HERNIA REPAIR  2015    [facility]   • HYSTERECTOMY      Partial   • KNEE SURGERY Right    • LEG SURGERY Left     Broken   • MASTECTOMY Right    • NJ TREAT TIBIAL SHAFT FX, INTRAMED IMPLANT Left 2017    Procedure: TIBIA INTRAMEDULLARY NAIL/DON INSERTION;  Surgeon: Romero Shanks MD;  Location: [facility];  Service: Orthopedics   • THORACIC DECOMPRESSION POSTERIOR FUSION WITH INSTRUMENTATION N/A 2017    Procedure: T6 costotransversectomy and decompression with T3 to  T9 posterior spinal [facility];  Surgeon: Quan Nayak MD;  Location: [facility];  Service:        ONCOLOGIC HISTORY:  (History from previous dates can be found in the separate document.)    Current Outpatient Medications on File Prior to Visit   Medication Sig Dispense Refill   • acetaminophen (TYLENOL) 500 MG tablet Take 500 mg by mouth Every 6 (Six) Hours As Needed for Mild Pain .     • apixaban (ELIQUIS) 5 MG tablet tablet Take 1 tablet by  mouth Every 12 (Twelve) Hours. 60 tablet 5   • calcium carbonate (OS-CARY) 600 MG tablet Take 600 mg by mouth 2 (Two) Times a Day With Meals.     • capecitabine (XELODA) 500 MG chemo tablet Take 3 tabs (1500 mg) by mouth twice a day for 7 days on then 7 days off. 84 tablet 3   • cholecalciferol (VITAMIN D3) 1000 units tablet Take 1,000 Units by mouth Daily.     • diazePAM (VALIUM) 10 MG tablet Take 1 tablet by mouth Every 12 (Twelve) Hours As Needed for Anxiety. 10 tablet 1   • diphenoxylate-atropine (LOMOTIL) 2.5-0.025 MG per tablet Take 1 tablet by mouth 4 (Four) Times a Day As Needed for Diarrhea. 60 tablet 2   • FLONASE ALLERGY RELIEF 50 MCG/ACT nasal spray 2 sprays into each nostril daily.  11   • gabapentin (NEURONTIN) 300 MG capsule Take 1 capsule by mouth Daily. 90 capsule 3   • mesalamine (LIALDA) 1.2 g EC tablet Take 1 tablet by mouth 2 (Two) Times a Day. 180 tablet 0   • methylPREDNISolone (MEDROL, ZARI,) 4 MG tablet Take as directed on package instructions. 1 each 0   • nitrofurantoin, macrocrystal-monohydrate, (MACROBID) 100 MG capsule Take 1 capsule by mouth 2 (Two) Times a Day. 10 capsule 0   • omeprazole (PriLOSEC) 40 MG capsule TAKE 1 CAPSULE DAILY 90 capsule 2   • ondansetron ODT (ZOFRAN-ODT) 8 MG disintegrating tablet Take 1 tablet by mouth Every 8 (Eight) Hours As Needed for Nausea or Vomiting. 30 tablet 1   • phenazopyridine (PYRIDIUM) 200 MG tablet Take 200 mg by mouth 3 (Three) Times a Day As Needed for bladder spasms.     • prochlorperazine (COMPAZINE) 10 MG tablet Take 1 tablet by mouth Every 6 (Six) Hours As Needed for Nausea or Vomiting. 30 tablet 0   • sennosides-docusate sodium (SENOKOT-S) 8.6-50 MG tablet Take 2 tablets by mouth 2 (Two) Times a Day. 90 tablet 2   • temazepam (RESTORIL) 15 MG capsule Take 1 capsule by mouth At Night As Needed for Sleep. 30 capsule 1   • traMADol (ULTRAM) 50 MG tablet Take 1 tablet by mouth Every 8 (Eight) Hours As Needed for Moderate Pain . 90 tablet 4   •  trimethoprim (TRIMPEX) 100 MG tablet Take 100 mg by mouth Daily.  2   • Tuberculin PPD (APLISOL ID) Inject  into the skin.     • Unable to find SWISH AND SPIT 5 ML PO Q 2 H PRN  0     No current facility-administered medications on file prior to visit.        ALLERGIES:     Allergies   Allergen Reactions   • Hydrocodone Nausea Only   • Morphine And Related Nausea And Vomiting     Social History     Socioeconomic History   • Marital status:      Spouse name: Murray   • Number of children: 3   • Years of education: College   • Highest education level: Not on file   Occupational History     Employer: [employer]     Employer: RETIRED   Tobacco Use   • Smoking status: Never Smoker   • Smokeless tobacco: Never Used   Substance and Sexual Activity   • Alcohol use: Yes     Comment: social   • Drug use: No   • Sexual activity: Defer     Family History   Problem Relation Age of Onset   • Heart disease Mother    • Hyperlipidemia Mother    • Hypertension Mother    • Diabetes Father    • Charcot-Saloni-Tooth disease Father    • Heart disease Father    • Hypertension Father    • Heart failure Father    • Diabetes Sister    • Heart disease Sister    • Hypertension Sister    • Heart disease Maternal Aunt    • Scoliosis Maternal Aunt    • Heart disease Maternal Uncle    • Diabetes Maternal Grandmother    • Heart disease Maternal Grandmother    • Hypertension Maternal Grandmother    • Uterine cancer Maternal Grandmother    • Heart disease Maternal Grandfather      Review of Systems   Constitutional: Positive for fatigue. Negative for activity change, appetite change, fever and unexpected weight change.   HENT: Negative for congestion, mouth sores, nosebleeds, sore throat and voice change.    Eyes: Negative for discharge.   Respiratory: Positive for cough. Negative for shortness of breath and wheezing.    Cardiovascular: Negative for chest pain, palpitations and leg swelling.   Gastrointestinal: Negative for abdominal  distention, abdominal pain, blood in stool, constipation, diarrhea, nausea and vomiting.   Endocrine: Negative for cold intolerance and heat intolerance.   Genitourinary: Negative for difficulty urinating, dysuria, flank pain, frequency, hematuria and urgency.   Musculoskeletal: Positive for arthralgias. Negative for back pain, joint swelling and myalgias.   Skin: Positive for rash. Negative for wound.   Neurological: Negative for dizziness, syncope, weakness, light-headedness, numbness and headaches.   Hematological: Negative for adenopathy. Does not bruise/bleed easily.   Psychiatric/Behavioral: Negative for confusion and sleep disturbance. The patient is not nervous/anxious.          Objective      Vitals:    09/23/19 1356   BP: 128/85   Pulse: 91   Resp: 16   Temp: 98.1 °F (36.7 °C)   SpO2: 97%      Current Status 9/23/2019   ECOG score 0   Pain 0/10    Physical Exam   Constitutional: She is oriented to person, place, and time. She appears well-developed and well-nourished.   HENT:   Mouth/Throat: Oropharynx is clear and moist.   Eyes: Conjunctivae are normal.   Neck: No thyromegaly present.   Cardiovascular: Normal rate and regular rhythm. Exam reveals no gallop and no friction rub.   No murmur heard.  Pulmonary/Chest: Breath sounds normal. No respiratory distress.   Abdominal: Soft. Bowel sounds are normal. She exhibits no distension. There is no tenderness.   Musculoskeletal: She exhibits no edema.   Lower extremity braces in place.  No significant lower extremity edema   Lymphadenopathy:        Head (right side): No submandibular adenopathy present.     She has no cervical adenopathy.     She has no axillary adenopathy.        Right: No inguinal and no supraclavicular adenopathy present.        Left: No inguinal and no supraclavicular adenopathy present.   Neurological: She is alert and oriented to person, place, and time. No cranial nerve deficit.   Bilateral lower extremity strength, 4+/5   Skin: Skin is  warm and dry. No rash noted.   Mild erythema involving the palms of the hands.  Mild degree of skin cracking with minor peeling   Psychiatric: She has a normal mood and affect. Her behavior is normal.   Patient was examined today, unchanged from above.    RECENT LABS:  Hematology WBC   Date Value Ref Range Status   09/23/2019 5.15 3.40 - 10.80 10*3/mm3 Final     RBC   Date Value Ref Range Status   09/23/2019 4.26 3.77 - 5.28 10*6/mm3 Final     Hemoglobin   Date Value Ref Range Status   09/23/2019 13.2 12.0 - 15.9 g/dL Final     Hematocrit   Date Value Ref Range Status   09/23/2019 40.6 34.0 - 46.6 % Final     Platelets   Date Value Ref Range Status   09/23/2019 253 140 - 450 10*3/mm3 Final        Lab Results   Component Value Date    GLUCOSE 121 (H) 09/23/2019    BUN 23 (H) 09/23/2019    CREATININE 1.11 (H) 09/23/2019    EGFRIFNONA 50 (L) 09/23/2019    EGFRIFAFRI 80 09/25/2017    BCR 20.7 09/23/2019    K 4.9 09/23/2019    CO2 29.1 (H) 09/23/2019    CALCIUM 9.8 09/23/2019    PROTENTOTREF 6.4 09/25/2017    ALBUMIN 4.50 09/23/2019    LABIL2 2.2 09/25/2017    AST 20 09/23/2019    ALT 16 09/23/2019     CT chest abdomen pelvis 9/17/2019: I did personally review CT images today.  IMPRESSION:  1. The groundglass opacities at the right lower lobe and right upper  lobe have resolved. The tiny reticular opacity at the right upper lobe  is stable and the cluster of nodules within the right lower lobe are  stable as well. There is no lymphadenopathy or new abnormality within  the chest. The 1.8 cm subcutaneous nodule at the lateral aspect of the  right mid chest is stable.  2. There is no convincing evidence for visceral metastases within the  abdomen or pelvis.    Bone scan 9/17/2019:  IMPRESSION:  Stable bone scan.      Assessment/Plan      1. Previous Stage Ib ([id number]) right breast cancer:  · Diagnosed May 2010 with excisional biopsy for invasive ductal carcinoma, 1.3 cm, grade 2, ER 90%, CA 80%, HER-2/peetr negative (1+  IHC).    · Subsequent right mastectomy in July 2010 with no residual breast malignancy, 1/5 sentinel lymph node with micrometastasis (0.25 mm).    · Treated in the [facility] system with adjuvant AC ×4 cycles in 2010 (no taxanes administered due to underlying Charcot-Saloni-Tooth with peripheral neuropathy).    · Adjuvant Femara (postmenopausal) initiated October 2010 with plan to treat ×10 years.    · Genetic testing reportedly negative.    · Developed osteopenia treated with Prolia beginning 2/27/13. Subsequently discontinued due to identification of metastatic disease.  2. Recurrent/metastatic disease identified 10/8/17:  · Disease involving thoracic spine with cord compression at T6, lumbosacral involvement, sternal and right sternoclavicular involvement.    · Femara discontinued in 10/2017.    · Radiation administered (in the [facility] system) to the thoracic spine beginning 10/19/17 treating T3-T9 to a dose of 24 gray in 6 fractions.  · Evaluation with MRI 12/8/17 showing persistent T6 cord compression with persistent neurologic compromise requiring surgical treatment 12/11/17 with T6 laminectomy/corpectomy and T3-T9 fusion.  Pathology with metastatic carcinoma of breast origin, ER negative, NH negative, HER-2/peter negative (1+ IHC).  · Additional staging evaluation 12/8/17 with no evidence of visceral metastatic disease, bone scan showing involvement of thoracic spine, sternum, left humerus, mid frontal bone.  No plane film correlate of left humerus lesion.  MRI lumbar spine with small intradural L3 metastasis.  CA 15-3 12/6/17- 17.  · Palliative radiation therapy to L3 dural metastasis and left humerus initiated 1/15/18 (10 fractions), completed 1/26/18.  · Hypercalcemia of malignancy with calcium in the 10-11 range.  · Initiation of monthly Xgeva 1/23/18.  · Baseline CT scan 1/30/18 with no evidence of visceral involvement.  Cluster of nodular opacities in the right lower lobe suspected to be infectious or related  to bronchiolitis. Bone scan 1/30/18 showed postsurgical change in the thoracic spine, stable uptake in the frontal bone, no new areas of disease.  · Initiation of palliative oral single agent Xeloda 2/7/18 2000 mg a.m., 1500 mg p.m. for 14/21 days.   · Following 3 cycles xeloda, bone scan 4/4/18 showed no change from the prior study.  CT scan 4/4/18 showed a small pericardial effusion of unclear significance as well as a subcutaneous nodule in the right lateral chest wall.  Subsequent evaluation with echocardiogram 4/17/18 showed no evidence of pericardial effusion.  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  We discussed the possibility of surgical excision of the right subcutaneous chest wall lesion for more definitive diagnosis.  Reviewed previous CT images dating back to 12/8/17 and the lesion was present even at that time measuring around 1.7 cm although not commented on in the radiology report.  As this appears to be an indolent low-grade process unrelated to her breast cancer, recommendee foregoing surgical excision at this time and monitoring this area on future scans.  The patient agreed.    · Following 6 cycles of Xeloda, CT 6/6/18  showed stable findings, no evidence of progressive disease.  There was a comment regarding subcutaneous abnormality in the anterior abdominal wall and this was related to Lovenox injection sites.  Bone scan 6/6/18 showed no interval change.   · CT scan and bone scan 8/13/18 following 9 cycles of Xeloda showed stable findings with no evidence of significant visceral metastases.  Her bone lesions appear stable on bone scan.  The spindle cell neoplasm in her right chest wall actually decreased in size from 2 cm down to 1.6 cm.    · The patient experienced some symptoms of diarrhea, anorexia, generalized weakness during cycle 9 Xeloda it  was unclear whether this was related to a viral gastroenteritis or toxicity from treatment.  Symptoms recurred during cycle 10 and treatment was cut short by 2 days.  Symptoms attributed to Xeloda.  With cycle 11, dose and schedule altered to 1500 mg twice daily for 7 days on followed by 7 days off .  · Most recent scans with bone scan 7/23/2019 showing no changes.  CT scan 7/23/2019 with stable bony lesions and stable cluster of nodules right lower lobe.  New small reticulonodular groundglass opacity in the right lower lobe as well as right upper lobe measuring 1.5 and 1.7 cm respectively.  These had the appearance of inflammatory change.  The patient was asymptomatic from a respiratory standpoint.  Today, the patient returns continuing on oral Xeloda 1500 mg twice daily 7 days on followed by 7 days off.  · She will begin a new week of Xeloda on 9/25/2019.  She is due for monthly Xgeva today.  She is continuing to tolerate treatment quite well, mild chronic hand-foot symptoms stable.  We did review her repeat scans today from 9/17/2019.  The previously identified groundglass opacities in the right lower lobe and right upper lobe resolved.  The small abnormality in the right upper lobe remains stable.  There were no other changes noted, no visceral metastases identified.  Bone scan showed no change.  Sclerotic bone metastases on CT were unchanged.  Therefore with evidence of stable disease we will proceed on with treatment as planned.  She will receive her Xgeva as well today.  I will see her back in 4 weeks when she is again due for Xgeva.  We will consider repeat scans at a 3-month interval pending her clinical status.  3. Right pulmonary embolism:  · Diagnosed on CT angiogram 10/21/17 involving small right lower lobe pulmonary artery.  Lower extremity Dopplers negative.  · Bilateral lower extremity Dopplers negative again 12/5/17.  · Received chronic Lovenox 1 mg/kg twice per day, transition to oral Eliquis in  February 2019, continuing on Eliquis 5 mg twice daily.  4. Cancer related pain:  · Previously receiving Duragesic 50 µg patch every 72 hours along with Dilaudid 4 mg as needed for breakthrough pain  · The patient's pain improved over time and she was able to discontinue both Duragesic and Dilaudid in the interval.  · Patient does take occasional Flexeril at bedtime due to back spasm/pain when she has been more active.  · The patient does have some occasional aggravation of her chronic back pain with significant rehabilitation activity and requires an occasional dose of Dilaudid.  No narcotic requirements recently.  5. Chemotherapy-induced diarrhea with subsequent C. difficile colitis:  · The patient developed initial diarrhea related to Xeloda at regular dosing.  · Symptoms improved on reduced dose Xeloda  · Flare of symptoms in October 2018 with apparent finding of C. difficile colitis by GI, treated with course of oral vancomycin with improvement in symptoms.  · Patient notes minimal intermittent diarrhea on Xeloda requiring occasional dosing of Imodium.  6. Traumatic left tibia/fibular fracture:  · Status post ORIF 12/6/17  · Specimen was sent for pathologic review, negative for evidence of malignancy  7. Hypercalcemia:  · Suspect hypercalcemia of malignancy, calcium in  10-11 range previously.  · Calcium normalized following initiation of monthly Xgeva on 1/23/18.  8. Chemotherapy-induced mucositis:  · Patient had a minimal degree of mucositis with cycle 2.  The patient has magic mouthwash to use as needed.  No subsequent mucositis.  9. Recurrent UTI, bladder wall thickening on CT:  · Patient had an enterococcal UTI on 3/2/18 sensitive to nitrofurantoin and received treatment, unclear how long.  · Recurrent UTI 3/20/18 with urine culture growing Klebsiella, initially treated with nitrofurantoin, transitioned to Levaquin.  · CT 4/4/18 with diffuse bladder wall thickening with increased nodular thickening at the  left base.  Referral to urogynecology Dr. May Johnson.  She was placed on a prophylactic dose of trimethoprim 100 mg daily, bladder wall thickening felt to be related to recent recurrent urinary tract infections.  · Patient with urinary symptoms, treated with course of Macrobid at the end of December 2018, urine culture however was negative 12/31/18.  · Patient was found to have Klebsiella UTI 7/29/2019 which was successfully treated with Macrobid with complete resolution of symptoms.  10.   Mobility:  · The patient underwent an intensive course of [facility] at [facility].  She graduated from her outpatient course November 2018.  · Overall the patient has improved dramatically in terms of mobility, now walking around 1 mile per day without assistance.  11.  Depression:  · The patient weaned herself off of Cymbalta and reports that her symptoms remain stable.  12. 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  Symptoms did improve and she continued on the same dose.    · Symptoms are very manageable and mild currently with only slight erythema and some skin cracking in the hands.  13. Elevated liver function studies:  · Liver function studies increased 8/20/19 with ALT 98, AST 70, normal total bilirubin.  · Negative viral hepatitis A, B, and C panel 8/23/18  · Likely related to hepatic steatosis seen on CT, no definitive evidence of metastatic liver lesions.   · Subsequent improvement in LFTs  · Today, LFTs are normal  14. Chemotherapy induced leukopenia:  · Patient with mild leukopenia secondary to Xeloda, subsequently improved with WBC today 5.15, ANC 2.86.  15. GERD:  · The patient noted at the last visit an increase in reflux symptoms despite omeprazole 20 mg daily and also noted increased cough possibly related.  She was advised to  increase her omeprazole dose to 40 mg daily.  · Patient returns today reporting that her symptoms are not improved.  She has occasionally late at night which she is aware can exacerbate her symptoms.  She does have a cough that is worse when lying down at night.  I suggested that she return to see her GI physician Dr. Ocasio to discuss the situation further and to discuss potential need for EGD.  In the interval I will also prescribe Carafate 1 g 4 times daily.  16. Insomnia:  · Patient with prior paradoxical reaction to Benadryl  · Improved on temazepam 15 mg nightly as needed.   17. Health maintenance:  · Patient notes that she has a history of colon polyps and is due for a follow-up colonoscopy on 9/12/2019.  We did discuss there is no necessity to pursue colonoscopy in the setting of her metastatic breast cancer.  The patient was going to proceed however due to her 's health issues she has deferred the colonoscopy for now.    Plan:  1. Continue oral Xeloda 1500 mg twice a day 7 days on followed by 7 days off (patient will begin her next week on treatment on 9/25/2019).  2. Monthly Xgeva today  3. Continue Eliquis 5 mg twice daily.  4. Continue use of emollient cream on the hands and feet and continue to wear socks and gloves at night  5. Continue omeprazole 40 mg daily and add Carafate 1 g 4 times daily (prescription sent).  Patient will follow-up with her GI physician Dr. Ocasio for additional recommendations and potential need for EGD.  6. MD visit in 4 weeks with CBC, CMP, magnesium, phosphorus.  Patient will be due for Xgeva.     Patient continuing on high risk medication requiring intensive monitoring.

## 2019-09-26 RX ORDER — TRAMADOL HYDROCHLORIDE 50 MG/1
TABLET ORAL
Qty: 90 TABLET | Refills: 0 | Status: SHIPPED | OUTPATIENT
Start: 2019-09-26 | End: 2019-11-08 | Stop reason: SDUPTHER

## 2019-10-21 ENCOUNTER — LAB (OUTPATIENT)
Dept: OTHER | Facility: HOSPITAL | Age: 59
End: 2019-10-21

## 2019-10-21 ENCOUNTER — INFUSION (OUTPATIENT)
Dept: ONCOLOGY | Facility: HOSPITAL | Age: 59
End: 2019-10-21

## 2019-10-21 ENCOUNTER — OFFICE VISIT (OUTPATIENT)
Dept: ONCOLOGY | Facility: CLINIC | Age: 59
End: 2019-10-21

## 2019-10-21 VITALS
RESPIRATION RATE: 16 BRPM | DIASTOLIC BLOOD PRESSURE: 82 MMHG | HEIGHT: 61 IN | OXYGEN SATURATION: 97 % | HEART RATE: 91 BPM | SYSTOLIC BLOOD PRESSURE: 129 MMHG | TEMPERATURE: 97.4 F | WEIGHT: 193.3 LBS | BODY MASS INDEX: 36.5 KG/M2

## 2019-10-21 DIAGNOSIS — Z17.1 MALIGNANT NEOPLASM OF OVERLAPPING SITES OF RIGHT BREAST IN FEMALE, ESTROGEN RECEPTOR NEGATIVE (HCC): ICD-10-CM

## 2019-10-21 DIAGNOSIS — Z17.1 MALIGNANT NEOPLASM OF OVERLAPPING SITES OF RIGHT BREAST IN FEMALE, ESTROGEN RECEPTOR NEGATIVE (HCC): Primary | ICD-10-CM

## 2019-10-21 DIAGNOSIS — C50.811 MALIGNANT NEOPLASM OF OVERLAPPING SITES OF RIGHT BREAST IN FEMALE, ESTROGEN RECEPTOR NEGATIVE (HCC): Primary | ICD-10-CM

## 2019-10-21 DIAGNOSIS — C50.811 MALIGNANT NEOPLASM OF OVERLAPPING SITES OF RIGHT BREAST IN FEMALE, ESTROGEN RECEPTOR NEGATIVE (HCC): ICD-10-CM

## 2019-10-21 LAB
ALBUMIN SERPL-MCNC: 4.5 G/DL (ref 3.5–5.2)
ALBUMIN/GLOB SERPL: 1.7 G/DL
ALP SERPL-CCNC: 58 U/L (ref 39–117)
ALT SERPL W P-5'-P-CCNC: 13 U/L (ref 1–33)
ANION GAP SERPL CALCULATED.3IONS-SCNC: 9 MMOL/L (ref 5–15)
AST SERPL-CCNC: 16 U/L (ref 1–32)
BASOPHILS # BLD AUTO: 0.03 10*3/MM3 (ref 0–0.2)
BASOPHILS NFR BLD AUTO: 0.3 % (ref 0–1.5)
BILIRUB SERPL-MCNC: 0.4 MG/DL (ref 0.1–1.2)
BUN BLD-MCNC: 26 MG/DL (ref 6–20)
BUN/CREAT SERPL: 24.8 (ref 7–25)
CALCIUM SPEC-SCNC: 9.7 MG/DL (ref 8.6–10.5)
CHLORIDE SERPL-SCNC: 104 MMOL/L (ref 98–107)
CO2 SERPL-SCNC: 31 MMOL/L (ref 22–29)
CREAT BLD-MCNC: 1.05 MG/DL (ref 0.57–1)
DEPRECATED RDW RBC AUTO: 62.2 FL (ref 37–54)
EOSINOPHIL # BLD AUTO: 0.07 10*3/MM3 (ref 0–0.4)
EOSINOPHIL NFR BLD AUTO: 0.7 % (ref 0.3–6.2)
ERYTHROCYTE [DISTWIDTH] IN BLOOD BY AUTOMATED COUNT: 18.1 % (ref 12.3–15.4)
GFR SERPL CREATININE-BSD FRML MDRD: 54 ML/MIN/1.73
GLOBULIN UR ELPH-MCNC: 2.7 GM/DL
GLUCOSE BLD-MCNC: 129 MG/DL (ref 65–99)
HCT VFR BLD AUTO: 41 % (ref 34–46.6)
HGB BLD-MCNC: 13.4 G/DL (ref 12–15.9)
IMM GRANULOCYTES # BLD AUTO: 0.06 10*3/MM3 (ref 0–0.05)
IMM GRANULOCYTES NFR BLD AUTO: 0.6 % (ref 0–0.5)
LYMPHOCYTES # BLD AUTO: 2.44 10*3/MM3 (ref 0.7–3.1)
LYMPHOCYTES NFR BLD AUTO: 23.5 % (ref 19.6–45.3)
MAGNESIUM SERPL-MCNC: 2.5 MG/DL (ref 1.6–2.6)
MCH RBC QN AUTO: 31.7 PG (ref 26.6–33)
MCHC RBC AUTO-ENTMCNC: 32.7 G/DL (ref 31.5–35.7)
MCV RBC AUTO: 96.9 FL (ref 79–97)
MONOCYTES # BLD AUTO: 1.01 10*3/MM3 (ref 0.1–0.9)
MONOCYTES NFR BLD AUTO: 9.7 % (ref 5–12)
NEUTROPHILS # BLD AUTO: 6.77 10*3/MM3 (ref 1.7–7)
NEUTROPHILS NFR BLD AUTO: 65.2 % (ref 42.7–76)
NRBC BLD AUTO-RTO: 0 /100 WBC (ref 0–0.2)
PHOSPHATE SERPL-MCNC: 3 MG/DL (ref 2.5–4.5)
PLATELET # BLD AUTO: 291 10*3/MM3 (ref 140–450)
PMV BLD AUTO: 11 FL (ref 6–12)
POTASSIUM BLD-SCNC: 4.4 MMOL/L (ref 3.5–5.2)
PROT SERPL-MCNC: 7.2 G/DL (ref 6–8.5)
RBC # BLD AUTO: 4.23 10*6/MM3 (ref 3.77–5.28)
SODIUM BLD-SCNC: 144 MMOL/L (ref 136–145)
WBC NRBC COR # BLD: 10.38 10*3/MM3 (ref 3.4–10.8)

## 2019-10-21 PROCEDURE — 80053 COMPREHEN METABOLIC PANEL: CPT | Performed by: INTERNAL MEDICINE

## 2019-10-21 PROCEDURE — 83735 ASSAY OF MAGNESIUM: CPT | Performed by: INTERNAL MEDICINE

## 2019-10-21 PROCEDURE — 85025 COMPLETE CBC W/AUTO DIFF WBC: CPT | Performed by: INTERNAL MEDICINE

## 2019-10-21 PROCEDURE — 96372 THER/PROPH/DIAG INJ SC/IM: CPT | Performed by: INTERNAL MEDICINE

## 2019-10-21 PROCEDURE — 25010000002 DENOSUMAB 120 MG/1.7ML SOLUTION: Performed by: INTERNAL MEDICINE

## 2019-10-21 PROCEDURE — 99214 OFFICE O/P EST MOD 30 MIN: CPT | Performed by: INTERNAL MEDICINE

## 2019-10-21 PROCEDURE — 84100 ASSAY OF PHOSPHORUS: CPT | Performed by: INTERNAL MEDICINE

## 2019-10-21 PROCEDURE — 36415 COLL VENOUS BLD VENIPUNCTURE: CPT

## 2019-10-21 RX ADMIN — DENOSUMAB 120 MG: 120 INJECTION SUBCUTANEOUS at 14:44

## 2019-10-21 NOTE — PROGRESS NOTES
Subjective .     REASONS FOR FOLLOWUP:    1. Stage Ib ([id number]) right breast cancer: Diagnosed May 2010 with excisional biopsy for invasive ductal carcinoma, 1.3 cm, grade 2, ER 90%, GA 80%, HER-2/peter negative (1+ IHC).  Subsequent right mastectomy in July 2010 with no residual breast malignancy, 1/5 sentinel lymph node with micrometastasis (0.25 mm).  Treated in the [facility] system with adjuvant AC ×4 cycles in 2010 (no taxanes administered due to underlying Charcot-Saloni-Tooth with peripheral neuropathy).  Adjuvant Femara (postmenopausal) initiated October 2010 with plan to treat ×10 years.  Genetic testing reportedly negative.  Developed osteopenia treated with Prolia beginning 2/27/13.  2. Recurrent/metastatic disease identified 10/8/17 involving thoracic spine with cord compression at T6, lumbosacral involvement, sternal and right sternoclavicular involvement.  Femara discontinued.  Radiation administered (in the [facility] system) to the thoracic spine beginning 10/19/17 treating T3-T9 to a dose of 24 gray in 6 fractions.  3. Evaluation with MRI 12/8/17 showing persistent T6 cord compression with persistent neurologic compromise requiring surgical treatment 12/11/17 with T6 laminectomy/corpectomy and T3-T9 fusion.  Pathology with metastatic carcinoma of breast origin, ER negative, GA negative, HER-2/peter negative (1+ IHC).  4. Right pulmonary embolism 10/21/17 treated with Lovenox 1 mg/kg (100 mg) every 12 hours.  No evidence of DVT.  Lovenox held due to right gluteus hematoma 3/23/18 through 4/6/18.  Transitioned to oral Eliquis 5mg bid 1/28/19 (as of 2/25/19, patient still using previous supply of Lovenox).  5. Cancer related pain previously on Duragesic 50 µg patch every 72 hours and Dilaudid 4 mg as needed for breakthrough pain.  Narcotics subsequently discontinued with improvement in pain in January 2018.  6. Radiation therapy to L3 dural metastasis and left humerus initiated 1/15/18 (10 fractions),  completed 1/26/18  7. Monthly Xgeva initiated 1/23/18  8. Mild hypercalcemia of malignancy  9. Initiation of palliative oral single agent Xeloda 2/7/18 (2000 mg a.m., 1500 mg p.m. for 14/21 days).  10. 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  In reviewing prior CT scans, has been present for some time.  Monitor for now, if it enlarges consider surgical excision.  11. Cumulative side effects from Xeloda with fatigue, anorexia, diarrhea, increased tearing.  Alteration in dose/schedule with cycle 11 to 1500 mg twice a day for 7 days on followed by 7 days off.    HISTORY OF PRESENT ILLNESS:  The patient is a 58 y.o. year old female who is here for follow-up with the above-mentioned history.    History of Present Illness the patient returns today in follow-up continuing on oral Xeloda and also continuing on monthly Xgeva that is due today with laboratory studies to review.  In the interval, the patient reports that she has done fairly well.  She did have recurrence of a skin rash involving her face.  She presented to urgent care and was felt to have seborrheic dermatitis and was prescribed triamcinolone and econazole creams.  She has been using both of these with improvement.  She also noted that with previous episode of similar symptoms she had been using a coconut soap and more recently moved a cream on her face.  She is unsure whether this could have been a reaction.  She has no other skin rash.  Her hand-foot symptoms remain stable with some minor peeling in her palms.  She does use emollient cream frequently.  She notes that her reflux symptoms are improved on Carafate.  She is scheduled for colonoscopy in January with GI but did not inquire about EGD.  She notes a normal appetite.  She notes that her level of activity is stable.  She  developed some recent visual changes in her right eye with a burst of color.  She was seen by ophthalmology and was found to have a slight retinal tear on the right.  This is being managed conservatively and she is being followed.  Symptoms have improved.    Past Medical History:   Diagnosis Date   • Acute pulmonary embolism, cancer-related 10/2017   • Allergic rhinitis    • Arthritis     Right knee; left shoulder   • Cancer (CMS/HCC) 2010    Right breast   • Cancer (CMS/HCC) 10/2017    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   • Charcot-Saloni-Tooth disease    • CPAP (continuous positive airway pressure) dependence    • GERD (gastroesophageal reflux disease)    • H/O Colon polyps    • H/O Uterine fibroid    • Heart murmur    • Hyperlipidemia    • Hypertension    • PONV (postoperative nausea and vomiting)      Past Surgical History:   Procedure Laterality Date   • BLADDER SURGERY      Bladder lift   • BREAST RECONSTRUCTION, BREAST TISSUE EXPANDER INSERTION Right    • BREAST SURGERY Right 2010    Mastectomy   •  SECTION  ,    • CHOLECYSTECTOMY     • COLONOSCOPY     • HERNIA REPAIR  2015    [facility]   • HYSTERECTOMY      Partial   • KNEE SURGERY Right    • LEG SURGERY Left     Broken   • MASTECTOMY Right    • ND TREAT TIBIAL SHAFT FX, INTRAMED IMPLANT Left 2017    Procedure: TIBIA INTRAMEDULLARY NAIL/DON INSERTION;  Surgeon: Romero Shanks MD;  Location: [facility];  Service: Orthopedics   • THORACIC DECOMPRESSION POSTERIOR FUSION WITH INSTRUMENTATION N/A 2017    Procedure: T6 costotransversectomy and decompression with T3 to  T9 posterior spinal fusion with instrumentation with Jennifer Gonzalez and Nael Wilkes;  Surgeon: Quan Nayak MD;  Location: [facility];  Service:        ONCOLOGIC HISTORY:  (History from previous dates can be found in the separate document.)    Current Outpatient Medications on File Prior to Visit   Medication Sig Dispense Refill   •  ketoconazole (NIZORAL) 2 % cream Apply  topically to the appropriate area as directed Daily. 15 g 0   • methylPREDNISolone (MEDROL, ZARI,) 4 MG tablet Take as directed on package instructions. 1 each 0   • nitrofurantoin, macrocrystal-monohydrate, (MACROBID) 100 MG capsule Take 1 capsule by mouth 2 (Two) Times a Day. 10 capsule 0   • omeprazole (PriLOSEC) 40 MG capsule TAKE 1 CAPSULE DAILY 90 capsule 2   • ondansetron ODT (ZOFRAN-ODT) 8 MG disintegrating tablet Take 1 tablet by mouth Every 8 (Eight) Hours As Needed for Nausea or Vomiting. 30 tablet 1   • phenazopyridine (PYRIDIUM) 200 MG tablet Take 200 mg by mouth 3 (Three) Times a Day As Needed for bladder spasms.     • predniSONE (DELTASONE) 20 MG tablet Take 2 tablets by mouth Daily. 6 tablet 0   • prochlorperazine (COMPAZINE) 10 MG tablet Take 1 tablet by mouth Every 6 (Six) Hours As Needed for Nausea or Vomiting. 30 tablet 0   • sennosides-docusate sodium (SENOKOT-S) 8.6-50 MG tablet Take 2 tablets by mouth 2 (Two) Times a Day. 90 tablet 2   • sucralfate (CARAFATE) 1 g tablet Take 1 tablet by mouth 4 (Four) Times a Day. 30 tablet 2   • temazepam (RESTORIL) 15 MG capsule Take 1 capsule by mouth At Night As Needed for Sleep. 30 capsule 1   • traMADol (ULTRAM) 50 MG tablet TAKE 1 TABLET BY MOUTH EVERY 8 HOURS AS NEEDED FOR MODERATE PAIN 90 tablet 0   • triamcinolone (KENALOG) 0.1 % cream Apply  topically to the appropriate area as directed 2 (Two) Times a Day. 15 g 0   • trimethoprim (TRIMPEX) 100 MG tablet Take 100 mg by mouth Daily.  2   • Unable to find SWISH AND SPIT 5 ML PO Q 2 H PRN  0   • acetaminophen (TYLENOL) 500 MG tablet Take 500 mg by mouth Every 6 (Six) Hours As Needed for Mild Pain .     • apixaban (ELIQUIS) 5 MG tablet tablet Take 1 tablet by mouth Every 12 (Twelve) Hours. 60 tablet 5   • calcium carbonate (OS-CARY) 600 MG tablet Take 600 mg by mouth 2 (Two) Times a Day With Meals.     • capecitabine (XELODA) 500 MG chemo tablet Take 3 tabs (1500  mg) by mouth twice a day for 7 days on then 7 days off. 84 tablet 3   • cholecalciferol (VITAMIN D3) 1000 units tablet Take 1,000 Units by mouth Daily.     • diazePAM (VALIUM) 10 MG tablet Take 1 tablet by mouth Every 12 (Twelve) Hours As Needed for Anxiety. 10 tablet 1   • diphenoxylate-atropine (LOMOTIL) 2.5-0.025 MG per tablet Take 1 tablet by mouth 4 (Four) Times a Day As Needed for Diarrhea. 60 tablet 2   • FLONASE ALLERGY RELIEF 50 MCG/ACT nasal spray 2 sprays into each nostril daily.  11   • gabapentin (NEURONTIN) 300 MG capsule Take 1 capsule by mouth Daily. 90 capsule 3   • mesalamine (LIALDA) 1.2 g EC tablet Take 1 tablet by mouth 2 (Two) Times a Day. 180 tablet 0   • Tuberculin PPD (APLISOL ID) Inject  into the skin.       No current facility-administered medications on file prior to visit.        ALLERGIES:     Allergies   Allergen Reactions   • Hydrocodone Nausea Only   • Morphine And Related Nausea And Vomiting     Social History     Socioeconomic History   • Marital status:      Spouse name: Murray   • Number of children: 3   • Years of education: College   • Highest education level: Not on file   Occupational History     Employer: [employer]     Employer: RETIRED   Tobacco Use   • Smoking status: Never Smoker   • Smokeless tobacco: Never Used   Substance and Sexual Activity   • Alcohol use: Yes     Comment: social   • Drug use: No   • Sexual activity: Defer     Family History   Problem Relation Age of Onset   • Heart disease Mother    • Hyperlipidemia Mother    • Hypertension Mother    • Diabetes Father    • Charcot-Saloni-Tooth disease Father    • Heart disease Father    • Hypertension Father    • Heart failure Father    • Diabetes Sister    • Heart disease Sister    • Hypertension Sister    • Heart disease Maternal Aunt    • Scoliosis Maternal Aunt    • Heart disease Maternal Uncle    • Diabetes Maternal Grandmother    • Heart disease Maternal Grandmother    • Hypertension Maternal  Grandmother    • Uterine cancer Maternal Grandmother    • Heart disease Maternal Grandfather      Review of Systems   Constitutional: Positive for fatigue. Negative for activity change, appetite change, fever and unexpected weight change.   HENT: Negative for congestion, mouth sores, nosebleeds, sore throat and voice change.    Eyes: Negative for discharge.   Respiratory: Negative for cough, shortness of breath and wheezing.    Cardiovascular: Negative for chest pain, palpitations and leg swelling.   Gastrointestinal: Negative for abdominal distention, abdominal pain, blood in stool, constipation, diarrhea, nausea and vomiting.   Endocrine: Negative for cold intolerance and heat intolerance.   Genitourinary: Negative for difficulty urinating, dysuria, flank pain, frequency, hematuria and urgency.   Musculoskeletal: Positive for arthralgias. Negative for back pain, joint swelling and myalgias.   Skin: Positive for rash. Negative for wound.   Neurological: Negative for dizziness, syncope, weakness, light-headedness, numbness and headaches.   Hematological: Negative for adenopathy. Does not bruise/bleed easily.   Psychiatric/Behavioral: Negative for confusion and sleep disturbance. The patient is not nervous/anxious.          Objective      Vitals:    10/21/19 1347   BP: 129/82   Pulse: 91   Resp: 16   Temp: 97.4 °F (36.3 °C)   SpO2: 97%      Current Status 10/21/2019   ECOG score 0   Pain 0/10    Physical Exam   Constitutional: She is oriented to person, place, and time. She appears well-developed and well-nourished.   HENT:   Mouth/Throat: Oropharynx is clear and moist.   Eyes: Conjunctivae are normal.   Neck: No thyromegaly present.   Cardiovascular: Normal rate and regular rhythm. Exam reveals no gallop and no friction rub.   No murmur heard.  Pulmonary/Chest: Breath sounds normal. No respiratory distress.   Abdominal: Soft. Bowel sounds are normal. She exhibits no distension. There is no tenderness.    Musculoskeletal: She exhibits no edema.   Lower extremity braces in place.  No significant lower extremity edema   Lymphadenopathy:        Head (right side): No submandibular adenopathy present.     She has no cervical adenopathy.     She has no axillary adenopathy.        Right: No inguinal and no supraclavicular adenopathy present.        Left: No inguinal and no supraclavicular adenopathy present.   Neurological: She is alert and oriented to person, place, and time. No cranial nerve deficit.   Bilateral lower extremity strength, 4+/5   Skin: Skin is warm and dry. Rash noted.   Mild erythema involving the palms of the hands.  Mild degree of skin cracking with minor peeling.  There is a faint degree of erythema involving the face   Psychiatric: She has a normal mood and affect. Her behavior is normal.       RECENT LABS:  Hematology WBC   Date Value Ref Range Status   10/21/2019 10.38 3.40 - 10.80 10*3/mm3 Final     RBC   Date Value Ref Range Status   10/21/2019 4.23 3.77 - 5.28 10*6/mm3 Final     Hemoglobin   Date Value Ref Range Status   10/21/2019 13.4 12.0 - 15.9 g/dL Final     Hematocrit   Date Value Ref Range Status   10/21/2019 41.0 34.0 - 46.6 % Final     Platelets   Date Value Ref Range Status   10/21/2019 291 140 - 450 10*3/mm3 Final        Lab Results   Component Value Date    GLUCOSE 129 (H) 10/21/2019    BUN 26 (H) 10/21/2019    CREATININE 1.05 (H) 10/21/2019    EGFRIFNONA 54 (L) 10/21/2019    EGFRIFAFRI 80 09/25/2017    BCR 24.8 10/21/2019    K 4.4 10/21/2019    CO2 31.0 (H) 10/21/2019    CALCIUM 9.7 10/21/2019    PROTENTOTREF 6.4 09/25/2017    ALBUMIN 4.50 10/21/2019    LABIL2 2.2 09/25/2017    AST 16 10/21/2019    ALT 13 10/21/2019           Assessment/Plan      1. Previous Stage Ib ([id number]) right breast cancer:  · Diagnosed May 2010 with excisional biopsy for invasive ductal carcinoma, 1.3 cm, grade 2, ER 90%, IN 80%, HER-2/peter negative (1+ IHC).    · Subsequent right mastectomy in July 2010  with no residual breast malignancy, 1/5 sentinel lymph node with micrometastasis (0.25 mm).    · Treated in the [facility] system with adjuvant AC ×4 cycles in 2010 (no taxanes administered due to underlying Charcot-Saloni-Tooth with peripheral neuropathy).    · Adjuvant Femara (postmenopausal) initiated October 2010 with plan to treat ×10 years.    · Genetic testing reportedly negative.    · Developed osteopenia treated with Prolia beginning 2/27/13. Subsequently discontinued due to identification of metastatic disease.  2. Recurrent/metastatic disease identified 10/8/17:  · Disease involving thoracic spine with cord compression at T6, lumbosacral involvement, sternal and right sternoclavicular involvement.    · Femara discontinued in 10/2017.    · Radiation administered (in the [facility] system) to the thoracic spine beginning 10/19/17 treating T3-T9 to a dose of 24 gray in 6 fractions.  · Evaluation with MRI 12/8/17 showing persistent T6 cord compression with persistent neurologic compromise requiring surgical treatment 12/11/17 with T6 laminectomy/corpectomy and T3-T9 fusion.  Pathology with metastatic carcinoma of breast origin, ER negative, PA negative, HER-2/peter negative (1+ IHC).  · Additional staging evaluation 12/8/17 with no evidence of visceral metastatic disease, bone scan showing involvement of thoracic spine, sternum, left humerus, mid frontal bone.  No plane film correlate of left humerus lesion.  MRI lumbar spine with small intradural L3 metastasis.  CA 15-3 12/6/17- 17.  · Palliative radiation therapy to L3 dural metastasis and left humerus initiated 1/15/18 (10 fractions), completed 1/26/18.  · Hypercalcemia of malignancy with calcium in the 10-11 range.  · Initiation of monthly Xgeva 1/23/18.  · Baseline CT scan 1/30/18 with no evidence of visceral involvement.  Cluster of nodular opacities in the right lower lobe suspected to be infectious or related to bronchiolitis. Bone scan 1/30/18 showed  postsurgical change in the thoracic spine, stable uptake in the frontal bone, no new areas of disease.  · Initiation of palliative oral single agent Xeloda 2/7/18 2000 mg a.m., 1500 mg p.m. for 14/21 days.   · Following 3 cycles xeloda, bone scan 4/4/18 showed no change from the prior study.  CT scan 4/4/18 showed a small pericardial effusion of unclear significance as well as a subcutaneous nodule in the right lateral chest wall.  Subsequent evaluation with echocardiogram 4/17/18 showed no evidence of pericardial effusion.  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  We discussed the possibility of surgical excision of the right subcutaneous chest wall lesion for more definitive diagnosis.  Reviewed previous CT images dating back to 12/8/17 and the lesion was present even at that time measuring around 1.7 cm although not commented on in the radiology report.  As this appears to be an indolent low-grade process unrelated to her breast cancer, recommendee foregoing surgical excision at this time and monitoring this area on future scans.  The patient agreed.    · Following 6 cycles of Xeloda, CT 6/6/18  showed stable findings, no evidence of progressive disease.  There was a comment regarding subcutaneous abnormality in the anterior abdominal wall and this was related to Lovenox injection sites.  Bone scan 6/6/18 showed no interval change.   · CT scan and bone scan 8/13/18 following 9 cycles of Xeloda showed stable findings with no evidence of significant visceral metastases.  Her bone lesions appear stable on bone scan.  The spindle cell neoplasm in her right chest wall actually decreased in size from 2 cm down to 1.6 cm.    · The patient experienced some symptoms of diarrhea, anorexia, generalized weakness during cycle 9 Xeloda it was unclear whether this was related to a  viral gastroenteritis or toxicity from treatment.  Symptoms recurred during cycle 10 and treatment was cut short by 2 days.  Symptoms attributed to Xeloda.  With cycle 11, dose and schedule altered to 1500 mg twice daily for 7 days on followed by 7 days off .  · Most recent scans with bone scan 9/17/2019 showing no changes.  CT scan 9/17/2019 with stable bony lesions and resolved cluster of nodules right upper lobe and lower lobe.    · The patient returns today continuing on treatment with Xeloda 1500 mg twice daily 7 days on followed by 7 days off.  She is tolerating treatment reasonably well.  She is due for monthly Xgeva today.  It does not appear that her skin rash involving the face is related to Xeloda, is felt to possibly be a contact dermatitis from some coconut cream that she used previously.  She has stable mild hand-foot symptoms.  She will return here in 4 weeks for further clinical monitoring and we will plan 3-month interval CT and bone scan.  3. Right pulmonary embolism:  · Diagnosed on CT angiogram 10/21/17 involving small right lower lobe pulmonary artery.  Lower extremity Dopplers negative.  · Bilateral lower extremity Dopplers negative again 12/5/17.  · Received chronic Lovenox 1 mg/kg twice per day, transition to oral Eliquis in February 2019, continuing on Eliquis 5 mg twice daily.  4. Cancer related pain:  · Previously receiving Duragesic 50 µg patch every 72 hours along with Dilaudid 4 mg as needed for breakthrough pain  · The patient's pain improved over time and she was able to discontinue both Duragesic and Dilaudid in the interval.  · Patient does take occasional Flexeril at bedtime due to back spasm/pain when she has been more active.  · The patient does have some occasional aggravation of her chronic back pain with significant rehabilitation activity and requires an occasional dose of Dilaudid.  No narcotic requirements recently.  5. Chemotherapy-induced diarrhea with subsequent C.  difficile colitis:  · The patient developed initial diarrhea related to Xeloda at regular dosing.  · Symptoms improved on reduced dose Xeloda  · Flare of symptoms in October 2018 with apparent finding of C. difficile colitis by GI, treated with course of oral vancomycin with improvement in symptoms.  · Patient notes minimal intermittent diarrhea on Xeloda requiring occasional dosing of Imodium.  6. Traumatic left tibia/fibular fracture:  · Status post ORIF 12/6/17  · Specimen was sent for pathologic review, negative for evidence of malignancy  7. Hypercalcemia:  · Suspect hypercalcemia of malignancy, calcium in  10-11 range previously.  · Calcium normalized following initiation of monthly Xgeva on 1/23/18.  8. Chemotherapy-induced mucositis:  · Patient had a minimal degree of mucositis with cycle 2.  The patient has magic mouthwash to use as needed.  No subsequent mucositis.  9. Recurrent UTI, bladder wall thickening on CT:  · Patient had an enterococcal UTI on 3/2/18 sensitive to nitrofurantoin and received treatment, unclear how long.  · Recurrent UTI 3/20/18 with urine culture growing Klebsiella, initially treated with nitrofurantoin, transitioned to Levaquin.  · CT 4/4/18 with diffuse bladder wall thickening with increased nodular thickening at the left base.  Referral to urogynecology Dr. May Johnson.  She was placed on a prophylactic dose of trimethoprim 100 mg daily, bladder wall thickening felt to be related to recent recurrent urinary tract infections.  · Patient with urinary symptoms, treated with course of Macrobid at the end of December 2018, urine culture however was negative 12/31/18.  · Patient was found to have Klebsiella UTI 7/29/2019 which was successfully treated with Macrobid with complete resolution of symptoms.  10.   Mobility:  · The patient underwent an intensive course of [facility] at [facility].  She graduated from her outpatient course November 2018.  · Overall the patient has improved  dramatically in terms of mobility, now walking around 1 mile per day without assistance.  11.  Depression:  · The patient weaned herself off of Cymbalta and reports that her symptoms remain stable.  12. 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  Symptoms did improve and she continued on the same dose.    · Symptoms are very manageable and mild currently with only slight erythema and some skin cracking in the hands.  13. Elevated liver function studies:  · Liver function studies increased 8/20/19 with ALT 98, AST 70, normal total bilirubin.  · Negative viral hepatitis A, B, and C panel 8/23/18  · Likely related to hepatic steatosis seen on CT, no definitive evidence of metastatic liver lesions.   · Subsequent improvement in LFTs  · Today, LFTs are normal  14. Chemotherapy induced leukopenia:  · Patient with mild leukopenia secondary to Xeloda, subsequently improved with WBC today 10.38.  Patient denies any infectious symptoms with slight elevation in WBC.  15. GERD:  · The patient noted at the last visit an increase in reflux symptoms despite omeprazole 20 mg daily and also noted increased cough possibly related.  She was advised to increase her omeprazole dose to 40 mg daily.  · Added Carafate 1 g 4 times daily 9/23/2019.  · Today, patient reports that symptoms are improved.  She will inquire whether GI wishes to proceed with EGD when she is due for colonoscopy in January.  16. Insomnia:  · Patient with prior paradoxical reaction to Benadryl  · Improved on temazepam 15 mg nightly as needed.   17. Health maintenance:  · Patient notes that she has a history of colon polyps and was due for a follow-up colonoscopy on 9/12/2019.  We did discuss there is no necessity to pursue colonoscopy in the setting of her metastatic breast cancer.  The  patient deferred the colonoscopy but does wish to proceed as scheduled in January 2020.  18. Facial rash:  · Patient experienced a transient episode of this previously.  It did recur and she was seen in urgent care, felt to have seborrheic dermatitis and was prescribed triamcinolone and ketoconazole creams.  · Rash today is improved with minimal erythema involving the face.  In questioning the patient, she reports using a coconut soap on her face last time she developed a rash and recently used a new coconut butter cream on her face prior to the current episode.  This may be a contact dermatitis and she was cautioned against using these types of products.  She will notify us if the rash does not resolve.  Doubtful that this is related to Xeloda.    Plan:  1. Continue oral Xeloda 1500 mg twice a day 7 days on followed by 7 days off  2. Monthly Xgeva today  3. Continue Eliquis 5 mg twice daily.  4. Continue use of emollient cream on the hands and feet and continue to wear socks and gloves at night  5. Continue omeprazole 40 mg daily and Carafate 1 g 4 times daily.    6. MD visit in 4 weeks with CBC, CMP, magnesium, phosphorus.  Patient will be due for Xgeva.  We will plan repeat CT and bone scan at the end of that 4-week cycle.    Patient continuing on high risk medication requiring intensive monitoring.

## 2019-10-22 RX ORDER — APIXABAN 5 MG/1
TABLET, FILM COATED ORAL
Qty: 60 TABLET | Refills: 0 | OUTPATIENT
Start: 2019-10-22

## 2019-10-22 RX ORDER — APIXABAN 5 MG/1
TABLET, FILM COATED ORAL
Qty: 60 TABLET | Refills: 6 | Status: SHIPPED | OUTPATIENT
Start: 2019-10-22 | End: 2019-11-26 | Stop reason: SDUPTHER

## 2019-10-30 DIAGNOSIS — I10 ESSENTIAL HYPERTENSION: ICD-10-CM

## 2019-10-30 DIAGNOSIS — Z00.00 HEALTHCARE MAINTENANCE: ICD-10-CM

## 2019-10-30 DIAGNOSIS — E78.5 HYPERLIPIDEMIA, UNSPECIFIED HYPERLIPIDEMIA TYPE: Primary | ICD-10-CM

## 2019-11-04 LAB
ALBUMIN SERPL-MCNC: 4.4 G/DL (ref 3.5–5.2)
ALBUMIN/GLOB SERPL: 2.3 G/DL
ALP SERPL-CCNC: 58 U/L (ref 39–117)
ALT SERPL-CCNC: 17 U/L (ref 1–33)
AST SERPL-CCNC: 21 U/L (ref 1–32)
BASOPHILS # BLD AUTO: 0.05 10*3/MM3 (ref 0–0.2)
BASOPHILS NFR BLD AUTO: 1.2 % (ref 0–1.5)
BILIRUB SERPL-MCNC: 0.5 MG/DL (ref 0.2–1.2)
BUN SERPL-MCNC: 18 MG/DL (ref 6–20)
BUN/CREAT SERPL: 17.3 (ref 7–25)
CALCIUM SERPL-MCNC: 8.8 MG/DL (ref 8.6–10.5)
CHLORIDE SERPL-SCNC: 102 MMOL/L (ref 98–107)
CHOLEST SERPL-MCNC: 220 MG/DL (ref 0–200)
CO2 SERPL-SCNC: 28.6 MMOL/L (ref 22–29)
CREAT SERPL-MCNC: 1.04 MG/DL (ref 0.57–1)
EOSINOPHIL # BLD AUTO: 0.22 10*3/MM3 (ref 0–0.4)
EOSINOPHIL NFR BLD AUTO: 5.4 % (ref 0.3–6.2)
ERYTHROCYTE [DISTWIDTH] IN BLOOD BY AUTOMATED COUNT: 17.7 % (ref 12.3–15.4)
GLOBULIN SER CALC-MCNC: 1.9 GM/DL
GLUCOSE SERPL-MCNC: 113 MG/DL (ref 65–99)
HCT VFR BLD AUTO: 39.7 % (ref 34–46.6)
HDLC SERPL-MCNC: 70 MG/DL (ref 40–60)
HGB BLD-MCNC: 13.4 G/DL (ref 12–15.9)
IMM GRANULOCYTES # BLD AUTO: 0.01 10*3/MM3 (ref 0–0.05)
IMM GRANULOCYTES NFR BLD AUTO: 0.2 % (ref 0–0.5)
LDLC SERPL CALC-MCNC: 119 MG/DL (ref 0–100)
LYMPHOCYTES # BLD AUTO: 1.26 10*3/MM3 (ref 0.7–3.1)
LYMPHOCYTES NFR BLD AUTO: 30.8 % (ref 19.6–45.3)
MCH RBC QN AUTO: 31.5 PG (ref 26.6–33)
MCHC RBC AUTO-ENTMCNC: 33.8 G/DL (ref 31.5–35.7)
MCV RBC AUTO: 93.2 FL (ref 79–97)
MONOCYTES # BLD AUTO: 0.37 10*3/MM3 (ref 0.1–0.9)
MONOCYTES NFR BLD AUTO: 9 % (ref 5–12)
NEUTROPHILS # BLD AUTO: 2.18 10*3/MM3 (ref 1.7–7)
NEUTROPHILS NFR BLD AUTO: 53.4 % (ref 42.7–76)
NRBC BLD AUTO-RTO: 0.2 /100 WBC (ref 0–0.2)
PLATELET # BLD AUTO: 247 10*3/MM3 (ref 140–450)
POTASSIUM SERPL-SCNC: 5.2 MMOL/L (ref 3.5–5.2)
PROT SERPL-MCNC: 6.3 G/DL (ref 6–8.5)
RBC # BLD AUTO: 4.26 10*6/MM3 (ref 3.77–5.28)
SODIUM SERPL-SCNC: 144 MMOL/L (ref 136–145)
TRIGL SERPL-MCNC: 157 MG/DL (ref 0–150)
TSH SERPL DL<=0.005 MIU/L-ACNC: 3.77 UIU/ML (ref 0.27–4.2)
VLDLC SERPL CALC-MCNC: 31.4 MG/DL
WBC # BLD AUTO: 4.09 10*3/MM3 (ref 3.4–10.8)

## 2019-11-05 ENCOUNTER — TELEPHONE (OUTPATIENT)
Dept: ONCOLOGY | Facility: CLINIC | Age: 59
End: 2019-11-05

## 2019-11-05 RX ORDER — TEMAZEPAM 15 MG/1
15 CAPSULE ORAL NIGHTLY PRN
Qty: 30 CAPSULE | Refills: 2 | OUTPATIENT
Start: 2019-11-05 | End: 2020-02-04

## 2019-11-07 NOTE — PROGRESS NOTES
Subjective   Patient ID: Martha Davey is a 57 y.o. female is here today for follow-up. She is a little over 3 months out from a T6 laminectomy with a transpedicular corpectomy of T6. She has some mild upper back ache at night occasionally.     History of Present Illness    This patient returns today.  She is doing well.  She is beginning to walk with walker.  Her incision has healed well.    The following portions of the patient's history were reviewed and updated as appropriate: allergies, current medications, past family history, past medical history, past social history, past surgical history and problem list.    Review of Systems   Respiratory: Negative for chest tightness and shortness of breath.    Cardiovascular: Negative for chest pain.   Musculoskeletal: Positive for back pain ( Mild ache).   All other systems reviewed and are negative.      Objective   Physical Exam   Constitutional: She is oriented to person, place, and time. She appears well-developed and well-nourished.   Neurological: She is oriented to person, place, and time.     Neurologic Exam     Mental Status   Oriented to person, place, and time.       Assessment/Plan   Independent Review of Radiographic Studies:      Medical Decision Making:      I told the patient that from my point of view she can continue with therapy.  She is due to see Dr. Moore back in a couple of weeks and I will defer to him on further treatment.    Martha was seen today for back pain.    Diagnoses and all orders for this visit:    Other closed fracture of sixth thoracic vertebra with routine healing, subsequent encounter      Return if symptoms worsen or fail to improve.                  Keystone Flap Text: The defect edges were debeveled with a #15 scalpel blade.  Given the location of the defect, shape of the defect a keystone flap was deemed most appropriate.  Using a sterile surgical marker, an appropriate keystone flap was drawn incorporating the defect, outlining the appropriate donor tissue and placing the expected incisions within the relaxed skin tension lines where possible. The area thus outlined was incised deep to adipose tissue with a #15 scalpel blade.  The skin margins were undermined to an appropriate distance in all directions around the primary defect and laterally outward around the flap utilizing iris scissors.

## 2019-11-08 ENCOUNTER — OFFICE VISIT (OUTPATIENT)
Dept: INTERNAL MEDICINE | Facility: CLINIC | Age: 59
End: 2019-11-08

## 2019-11-08 VITALS
DIASTOLIC BLOOD PRESSURE: 100 MMHG | OXYGEN SATURATION: 98 % | BODY MASS INDEX: 37 KG/M2 | WEIGHT: 196 LBS | HEART RATE: 97 BPM | HEIGHT: 61 IN | SYSTOLIC BLOOD PRESSURE: 150 MMHG

## 2019-11-08 DIAGNOSIS — L30.9 DERMATITIS: ICD-10-CM

## 2019-11-08 DIAGNOSIS — C79.51 BONE METASTASES: ICD-10-CM

## 2019-11-08 DIAGNOSIS — I10 ESSENTIAL HYPERTENSION: Primary | ICD-10-CM

## 2019-11-08 PROCEDURE — 99214 OFFICE O/P EST MOD 30 MIN: CPT | Performed by: INTERNAL MEDICINE

## 2019-11-08 RX ORDER — LOSARTAN POTASSIUM 50 MG/1
50 TABLET ORAL DAILY
Qty: 90 TABLET | Refills: 2 | Status: SHIPPED | OUTPATIENT
Start: 2019-11-08 | End: 2020-08-11

## 2019-11-08 RX ORDER — METAXALONE 800 MG/1
800 TABLET ORAL 3 TIMES DAILY PRN
Qty: 30 TABLET | Refills: 3 | Status: SHIPPED | OUTPATIENT
Start: 2019-11-08 | End: 2020-11-17 | Stop reason: SDUPTHER

## 2019-11-08 RX ORDER — TRAMADOL HYDROCHLORIDE 50 MG/1
50 TABLET ORAL EVERY 8 HOURS PRN
Qty: 90 TABLET | Refills: 0 | Status: SHIPPED | OUTPATIENT
Start: 2019-11-08 | End: 2020-01-04

## 2019-11-08 RX ORDER — METHYLPREDNISOLONE 4 MG/1
TABLET ORAL
Qty: 21 EACH | Refills: 0 | Status: SHIPPED | OUTPATIENT
Start: 2019-11-08 | End: 2020-01-13

## 2019-11-08 NOTE — PATIENT INSTRUCTIONS
"DASH Eating Plan  DASH stands for \"Dietary Approaches to Stop Hypertension.\" The DASH eating plan is a healthy eating plan that has been shown to reduce high blood pressure (hypertension). It may also reduce your risk for type 2 diabetes, heart disease, and stroke. The DASH eating plan may also help with weight loss.  What are tips for following this plan?    General guidelines  · Avoid eating more than 2,300 mg (milligrams) of salt (sodium) a day. If you have hypertension, you may need to reduce your sodium intake to 1,500 mg a day.  · Limit alcohol intake to no more than 1 drink a day for nonpregnant women and 2 drinks a day for men. One drink equals 12 oz of beer, 5 oz of wine, or 1½ oz of hard liquor.  · Work with your health care provider to maintain a healthy body weight or to lose weight. Ask what an ideal weight is for you.  · Get at least 30 minutes of exercise that causes your heart to beat faster (aerobic exercise) most days of the week. Activities may include walking, swimming, or biking.  · Work with your health care provider or diet and nutrition specialist (dietitian) to adjust your eating plan to your individual calorie needs.  Reading food labels    · Check food labels for the amount of sodium per serving. Choose foods with less than 5 percent of the Daily Value of sodium. Generally, foods with less than 300 mg of sodium per serving fit into this eating plan.  · To find whole grains, look for the word \"whole\" as the first word in the ingredient list.  Shopping  · Buy products labeled as \"low-sodium\" or \"no salt added.\"  · Buy fresh foods. Avoid canned foods and premade or frozen meals.  Cooking  · Avoid adding salt when cooking. Use salt-free seasonings or herbs instead of table salt or sea salt. Check with your health care provider or pharmacist before using salt substitutes.  · Do not valle foods. Cook foods using healthy methods such as baking, boiling, grilling, and broiling instead.  · Cook with " heart-healthy oils, such as olive, canola, soybean, or sunflower oil.  Meal planning  · Eat a balanced diet that includes:  ? 5 or more servings of fruits and vegetables each day. At each meal, try to fill half of your plate with fruits and vegetables.  ? Up to 6-8 servings of whole grains each day.  ? Less than 6 oz of lean meat, poultry, or fish each day. A 3-oz serving of meat is about the same size as a deck of cards. One egg equals 1 oz.  ? 2 servings of low-fat dairy each day.  ? A serving of nuts, seeds, or beans 5 times each week.  ? Heart-healthy fats. Healthy fats called Omega-3 fatty acids are found in foods such as flaxseeds and coldwater fish, like sardines, salmon, and mackerel.  · Limit how much you eat of the following:  ? Canned or prepackaged foods.  ? Food that is high in trans fat, such as fried foods.  ? Food that is high in saturated fat, such as fatty meat.  ? Sweets, desserts, sugary drinks, and other foods with added sugar.  ? Full-fat dairy products.  · Do not salt foods before eating.  · Try to eat at least 2 vegetarian meals each week.  · Eat more home-cooked food and less restaurant, buffet, and fast food.  · When eating at a restaurant, ask that your food be prepared with less salt or no salt, if possible.  What foods are recommended?  The items listed may not be a complete list. Talk with your dietitian about what dietary choices are best for you.  Grains  Whole-grain or whole-wheat bread. Whole-grain or whole-wheat pasta. Brown rice. Oatmeal. Quinoa. Bulgur. Whole-grain and low-sodium cereals. Thelma bread. Low-fat, low-sodium crackers. Whole-wheat flour tortillas.  Vegetables  Fresh or frozen vegetables (raw, steamed, roasted, or grilled). Low-sodium or reduced-sodium tomato and vegetable juice. Low-sodium or reduced-sodium tomato sauce and tomato paste. Low-sodium or reduced-sodium canned vegetables.  Fruits  All fresh, dried, or frozen fruit. Canned fruit in natural juice (without  added sugar).  Meat and other protein foods  Skinless chicken or turkey. Ground chicken or turkey. Pork with fat trimmed off. Fish and seafood. Egg whites. Dried beans, peas, or lentils. Unsalted nuts, nut butters, and seeds. Unsalted canned beans. Lean cuts of beef with fat trimmed off. Low-sodium, lean deli meat.  Dairy  Low-fat (1%) or fat-free (skim) milk. Fat-free, low-fat, or reduced-fat cheeses. Nonfat, low-sodium ricotta or cottage cheese. Low-fat or nonfat yogurt. Low-fat, low-sodium cheese.  Fats and oils  Soft margarine without trans fats. Vegetable oil. Low-fat, reduced-fat, or light mayonnaise and salad dressings (reduced-sodium). Canola, safflower, olive, soybean, and sunflower oils. Avocado.  Seasoning and other foods  Herbs. Spices. Seasoning mixes without salt. Unsalted popcorn and pretzels. Fat-free sweets.  What foods are not recommended?  The items listed may not be a complete list. Talk with your dietitian about what dietary choices are best for you.  Grains  Baked goods made with fat, such as croissants, muffins, or some breads. Dry pasta or rice meal packs.  Vegetables  Creamed or fried vegetables. Vegetables in a cheese sauce. Regular canned vegetables (not low-sodium or reduced-sodium). Regular canned tomato sauce and paste (not low-sodium or reduced-sodium). Regular tomato and vegetable juice (not low-sodium or reduced-sodium). Pickles. Olives.  Fruits  Canned fruit in a light or heavy syrup. Fried fruit. Fruit in cream or butter sauce.  Meat and other protein foods  Fatty cuts of meat. Ribs. Fried meat. Alvarez. Sausage. Bologna and other processed lunch meats. Salami. Fatback. Hotdogs. Bratwurst. Salted nuts and seeds. Canned beans with added salt. Canned or smoked fish. Whole eggs or egg yolks. Chicken or turkey with skin.  Dairy  Whole or 2% milk, cream, and half-and-half. Whole or full-fat cream cheese. Whole-fat or sweetened yogurt. Full-fat cheese. Nondairy creamers. Whipped toppings.  Processed cheese and cheese spreads.  Fats and oils  Butter. Stick margarine. Lard. Shortening. Ghee. Alvarez fat. Tropical oils, such as coconut, palm kernel, or palm oil.  Seasoning and other foods  Salted popcorn and pretzels. Onion salt, garlic salt, seasoned salt, table salt, and sea salt. Worcestershire sauce. Tartar sauce. Barbecue sauce. Teriyaki sauce. Soy sauce, including reduced-sodium. Steak sauce. Canned and packaged gravies. Fish sauce. Oyster sauce. Cocktail sauce. Horseradish that you find on the shelf. Ketchup. Mustard. Meat flavorings and tenderizers. Bouillon cubes. Hot sauce and Tabasco sauce. Premade or packaged marinades. Premade or packaged taco seasonings. Relishes. Regular salad dressings.  Where to find more information:  · National Heart, Lung, and Blood [place]: www.nhlbi.nih.gov  · American Heart Association: www.heart.org  Summary  · The DASH eating plan is a healthy eating plan that has been shown to reduce high blood pressure (hypertension). It may also reduce your risk for type 2 diabetes, heart disease, and stroke.  · With the DASH eating plan, you should limit salt (sodium) intake to 2,300 mg a day. If you have hypertension, you may need to reduce your sodium intake to 1,500 mg a day.  · When on the DASH eating plan, aim to eat more fresh fruits and vegetables, whole grains, lean proteins, low-fat dairy, and heart-healthy fats.  · Work with your health care provider or diet and nutrition specialist (dietitian) to adjust your eating plan to your individual calorie needs.  This information is not intended to replace advice given to you by your health care provider. Make sure you discuss any questions you have with your health care provider.  Document Released: 12/06/2012 Document Revised: 12/11/2017 Document Reviewed: 12/11/2017  [employer] Patient Education © 2019 [employer]

## 2019-11-08 NOTE — PROGRESS NOTES
Chief Complaint   Patient presents with   • Back Pain     spasm   • Hyperlipidemia   breast ca, back spasm, allergic reaction    History of Present Illness   Martha Davey is a 58 y.o. female presents for follow up evaluation. In general she has been doing well. 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 She is having some back spasm w/ pain. She has a history of metastatic breast cancer and is on chronic chemo for this. She is noting some back spasms at time. Prn tramadol for back pain w/ good benefit. She has some OA pain knees and shoulder that responded fairly well to injections (prp).   She had an acute dermatological reaction to a recent skin product.         The following portions of the patient's history were reviewed and updated as appropriate: allergies, current medications, past family history, past medical history, past social history, past surgical history and problem list.  Current Outpatient Medications on File Prior to Visit   Medication Sig Dispense Refill   • acetaminophen (TYLENOL) 500 MG tablet Take 500 mg by mouth Every 6 (Six) Hours As Needed for Mild Pain .     • calcium carbonate (OS-CARY) 600 MG tablet Take 600 mg by mouth 2 (Two) Times a Day With Meals.     • capecitabine (XELODA) 500 MG chemo tablet Take 3 tabs (1500 mg) by mouth twice a day for 7 days on then 7 days off. 84 tablet 3   • cholecalciferol (VITAMIN D3) 1000 units tablet Take 1,000 Units by mouth Daily.     • diazePAM (VALIUM) 10 MG tablet Take 1 tablet by mouth Every 12 (Twelve) Hours As Needed for Anxiety. 10 tablet 1   • diphenoxylate-atropine (LOMOTIL) 2.5-0.025 MG per tablet Take 1 tablet by mouth 4 (Four) Times a Day As Needed for Diarrhea. 60 tablet 2   • ELIQUIS 5 MG tablet tablet TAKE 1 TABLET BY MOUTH EVERY 12 HOURS 60 tablet 6   • FLONASE ALLERGY RELIEF 50 MCG/ACT nasal spray 2 sprays into each nostril daily.  11   • gabapentin (NEURONTIN) 300 MG  capsule Take 1 capsule by mouth Daily. 90 capsule 3   • ketoconazole (NIZORAL) 2 % cream Apply  topically to the appropriate area as directed Daily. 15 g 0   • mesalamine (LIALDA) 1.2 g EC tablet Take 1 tablet by mouth 2 (Two) Times a Day. 180 tablet 0   • nitrofurantoin, macrocrystal-monohydrate, (MACROBID) 100 MG capsule Take 1 capsule by mouth 2 (Two) Times a Day. 10 capsule 0   • omeprazole (PriLOSEC) 40 MG capsule TAKE 1 CAPSULE DAILY 90 capsule 2   • ondansetron ODT (ZOFRAN-ODT) 8 MG disintegrating tablet Take 1 tablet by mouth Every 8 (Eight) Hours As Needed for Nausea or Vomiting. 30 tablet 1   • phenazopyridine (PYRIDIUM) 200 MG tablet Take 200 mg by mouth 3 (Three) Times a Day As Needed for bladder spasms.     • predniSONE (DELTASONE) 20 MG tablet Take 2 tablets by mouth Daily. 6 tablet 0   • prochlorperazine (COMPAZINE) 10 MG tablet Take 1 tablet by mouth Every 6 (Six) Hours As Needed for Nausea or Vomiting. 30 tablet 0   • sennosides-docusate sodium (SENOKOT-S) 8.6-50 MG tablet Take 2 tablets by mouth 2 (Two) Times a Day. 90 tablet 2   • sucralfate (CARAFATE) 1 g tablet Take 1 tablet by mouth 4 (Four) Times a Day. 30 tablet 2   • temazepam (RESTORIL) 15 MG capsule Take 1 capsule by mouth At Night As Needed for Sleep. 30 capsule 2   • traMADol (ULTRAM) 50 MG tablet TAKE 1 TABLET BY MOUTH EVERY 8 HOURS AS NEEDED FOR MODERATE PAIN 90 tablet 0   • triamcinolone (KENALOG) 0.1 % cream Apply  topically to the appropriate area as directed 2 (Two) Times a Day. 15 g 0   • trimethoprim (TRIMPEX) 100 MG tablet Take 100 mg by mouth Daily.  2   • Tuberculin PPD (APLISOL ID) Inject  into the skin.     • Unable to find SWISH AND SPIT 5 ML PO Q 2 H PRN  0   • [DISCONTINUED] methylPREDNISolone (MEDROL, ZARI,) 4 MG tablet Take as directed on package instructions. 1 each 0     No current facility-administered medications on file prior to visit.      Review of Systems   Constitutional: Negative.    HENT: Negative.    Eyes:  "Negative.    Respiratory: Negative.    Cardiovascular: Negative.    Gastrointestinal: Negative.    Endocrine: Negative.    Genitourinary: Negative.    Musculoskeletal: Positive for back pain.   Skin: Negative.    Allergic/Immunologic: Negative.    Neurological: Negative.    Hematological: Negative.    Psychiatric/Behavioral: Negative.        Objective   Physical Exam   Constitutional: She is oriented to person, place, and time. She appears well-developed and well-nourished.   HENT:   Head: Normocephalic and atraumatic.   Right Ear: External ear normal.   Left Ear: External ear normal.   Nose: Nose normal.   Mouth/Throat: Oropharynx is clear and moist.   Eyes: Conjunctivae and EOM are normal. Pupils are equal, round, and reactive to light.   Neck: Normal range of motion. Neck supple.   Cardiovascular: Normal rate, regular rhythm and normal heart sounds.   Pulmonary/Chest: Effort normal and breath sounds normal. No respiratory distress.   Abdominal: Soft. Bowel sounds are normal.   Musculoskeletal: Normal range of motion.   Neurological: She is alert and oriented to person, place, and time.   Skin: Skin is warm and dry.   Psychiatric: She has a normal mood and affect. Her behavior is normal. Judgment and thought content normal.   Nursing note and vitals reviewed.       /100   Pulse 97   Ht 154.9 cm (61\")   Wt 88.9 kg (196 lb)   LMP  (LMP Unknown)   SpO2 98%   BMI 37.03 kg/m²     Assessment/Plan   Diagnoses and all orders for this visit:    Essential hypertension    Bone metastases (CMS/HCC)    Dermatitis    Other orders  -     losartan (COZAAR) 50 MG tablet; Take 1 tablet by mouth Daily.  -     metaxalone (SKELAXIN) 800 MG tablet; Take 1 tablet by mouth 3 (Three) Times a Day As Needed for Muscle Spasms.  -     methylPREDNISolone (MEDROL, ZARI,) 4 MG tablet; Take as directed on package instructions.      Patient w/ hypertension. Will start losartan once daily for this. She has some back spasms and will use " skelaxin prn for this and will continue tramadol in a prn fashion as well. To restart water aerobics. She will have a medrol pack on hand if she has an acute skin reaction in the future. She will f/u routinely.

## 2019-11-15 ENCOUNTER — TRANSCRIBE ORDERS (OUTPATIENT)
Dept: ADMINISTRATIVE | Facility: HOSPITAL | Age: 59
End: 2019-11-15

## 2019-11-15 DIAGNOSIS — M25.511 RIGHT SHOULDER PAIN, UNSPECIFIED CHRONICITY: Primary | ICD-10-CM

## 2019-11-18 ENCOUNTER — INFUSION (OUTPATIENT)
Dept: ONCOLOGY | Facility: HOSPITAL | Age: 59
End: 2019-11-18

## 2019-11-18 ENCOUNTER — OFFICE VISIT (OUTPATIENT)
Dept: ONCOLOGY | Facility: CLINIC | Age: 59
End: 2019-11-18

## 2019-11-18 ENCOUNTER — LAB (OUTPATIENT)
Dept: OTHER | Facility: HOSPITAL | Age: 59
End: 2019-11-18

## 2019-11-18 VITALS
SYSTOLIC BLOOD PRESSURE: 136 MMHG | OXYGEN SATURATION: 97 % | RESPIRATION RATE: 16 BRPM | DIASTOLIC BLOOD PRESSURE: 79 MMHG | WEIGHT: 195.3 LBS | HEART RATE: 86 BPM | TEMPERATURE: 97.6 F | HEIGHT: 61 IN | BODY MASS INDEX: 36.87 KG/M2

## 2019-11-18 DIAGNOSIS — Z17.1 MALIGNANT NEOPLASM OF OVERLAPPING SITES OF RIGHT BREAST IN FEMALE, ESTROGEN RECEPTOR NEGATIVE (HCC): ICD-10-CM

## 2019-11-18 DIAGNOSIS — C50.811 MALIGNANT NEOPLASM OF OVERLAPPING SITES OF RIGHT BREAST IN FEMALE, ESTROGEN RECEPTOR NEGATIVE (HCC): ICD-10-CM

## 2019-11-18 DIAGNOSIS — Z17.1 MALIGNANT NEOPLASM OF OVERLAPPING SITES OF RIGHT BREAST IN FEMALE, ESTROGEN RECEPTOR NEGATIVE (HCC): Primary | ICD-10-CM

## 2019-11-18 DIAGNOSIS — C50.811 MALIGNANT NEOPLASM OF OVERLAPPING SITES OF RIGHT BREAST IN FEMALE, ESTROGEN RECEPTOR NEGATIVE (HCC): Primary | ICD-10-CM

## 2019-11-18 LAB
ALBUMIN SERPL-MCNC: 4.4 G/DL (ref 3.5–5.2)
ALBUMIN/GLOB SERPL: 1.8 G/DL
ALP SERPL-CCNC: 61 U/L (ref 39–117)
ALT SERPL W P-5'-P-CCNC: 17 U/L (ref 1–33)
ANION GAP SERPL CALCULATED.3IONS-SCNC: 8.7 MMOL/L (ref 5–15)
AST SERPL-CCNC: 21 U/L (ref 1–32)
BASOPHILS # BLD AUTO: 0.05 10*3/MM3 (ref 0–0.2)
BASOPHILS NFR BLD AUTO: 1.2 % (ref 0–1.5)
BILIRUB SERPL-MCNC: 0.4 MG/DL (ref 0.1–1.2)
BUN BLD-MCNC: 19 MG/DL (ref 6–20)
BUN/CREAT SERPL: 17.8 (ref 7–25)
CALCIUM SPEC-SCNC: 9.3 MG/DL (ref 8.6–10.5)
CHLORIDE SERPL-SCNC: 101 MMOL/L (ref 98–107)
CO2 SERPL-SCNC: 30.3 MMOL/L (ref 22–29)
CREAT BLD-MCNC: 1.07 MG/DL (ref 0.57–1)
DEPRECATED RDW RBC AUTO: 60.4 FL (ref 37–54)
EOSINOPHIL # BLD AUTO: 0.12 10*3/MM3 (ref 0–0.4)
EOSINOPHIL NFR BLD AUTO: 3 % (ref 0.3–6.2)
ERYTHROCYTE [DISTWIDTH] IN BLOOD BY AUTOMATED COUNT: 17.4 % (ref 12.3–15.4)
GFR SERPL CREATININE-BSD FRML MDRD: 53 ML/MIN/1.73
GLOBULIN UR ELPH-MCNC: 2.5 GM/DL
GLUCOSE BLD-MCNC: 108 MG/DL (ref 65–99)
HCT VFR BLD AUTO: 39.6 % (ref 34–46.6)
HGB BLD-MCNC: 13.1 G/DL (ref 12–15.9)
IMM GRANULOCYTES # BLD AUTO: 0.01 10*3/MM3 (ref 0–0.05)
IMM GRANULOCYTES NFR BLD AUTO: 0.2 % (ref 0–0.5)
LYMPHOCYTES # BLD AUTO: 1.26 10*3/MM3 (ref 0.7–3.1)
LYMPHOCYTES NFR BLD AUTO: 31.4 % (ref 19.6–45.3)
MAGNESIUM SERPL-MCNC: 2.2 MG/DL (ref 1.6–2.6)
MCH RBC QN AUTO: 31.8 PG (ref 26.6–33)
MCHC RBC AUTO-ENTMCNC: 33.1 G/DL (ref 31.5–35.7)
MCV RBC AUTO: 96.1 FL (ref 79–97)
MONOCYTES # BLD AUTO: 0.45 10*3/MM3 (ref 0.1–0.9)
MONOCYTES NFR BLD AUTO: 11.2 % (ref 5–12)
NEUTROPHILS # BLD AUTO: 2.12 10*3/MM3 (ref 1.7–7)
NEUTROPHILS NFR BLD AUTO: 53 % (ref 42.7–76)
NRBC BLD AUTO-RTO: 0 /100 WBC (ref 0–0.2)
PHOSPHATE SERPL-MCNC: 3.6 MG/DL (ref 2.5–4.5)
PLATELET # BLD AUTO: 265 10*3/MM3 (ref 140–450)
PMV BLD AUTO: 10.3 FL (ref 6–12)
POTASSIUM BLD-SCNC: 4.8 MMOL/L (ref 3.5–5.2)
PROT SERPL-MCNC: 6.9 G/DL (ref 6–8.5)
RBC # BLD AUTO: 4.12 10*6/MM3 (ref 3.77–5.28)
SODIUM BLD-SCNC: 140 MMOL/L (ref 136–145)
WBC NRBC COR # BLD: 4.01 10*3/MM3 (ref 3.4–10.8)

## 2019-11-18 PROCEDURE — 84100 ASSAY OF PHOSPHORUS: CPT | Performed by: INTERNAL MEDICINE

## 2019-11-18 PROCEDURE — 99214 OFFICE O/P EST MOD 30 MIN: CPT | Performed by: INTERNAL MEDICINE

## 2019-11-18 PROCEDURE — 85025 COMPLETE CBC W/AUTO DIFF WBC: CPT | Performed by: INTERNAL MEDICINE

## 2019-11-18 PROCEDURE — 25010000002 DENOSUMAB 120 MG/1.7ML SOLUTION: Performed by: INTERNAL MEDICINE

## 2019-11-18 PROCEDURE — 96372 THER/PROPH/DIAG INJ SC/IM: CPT | Performed by: INTERNAL MEDICINE

## 2019-11-18 PROCEDURE — 80053 COMPREHEN METABOLIC PANEL: CPT | Performed by: INTERNAL MEDICINE

## 2019-11-18 PROCEDURE — 36415 COLL VENOUS BLD VENIPUNCTURE: CPT

## 2019-11-18 PROCEDURE — 83735 ASSAY OF MAGNESIUM: CPT | Performed by: INTERNAL MEDICINE

## 2019-11-18 RX ADMIN — DENOSUMAB 120 MG: 120 INJECTION SUBCUTANEOUS at 15:16

## 2019-11-18 NOTE — PROGRESS NOTES
Subjective .     REASONS FOR FOLLOWUP:    1. Stage Ib ([id number]) right breast cancer: Diagnosed May 2010 with excisional biopsy for invasive ductal carcinoma, 1.3 cm, grade 2, ER 90%, NE 80%, HER-2/peter negative (1+ IHC).  Subsequent right mastectomy in July 2010 with no residual breast malignancy, 1/5 sentinel lymph node with micrometastasis (0.25 mm).  Treated in the [place] system with adjuvant AC ×4 cycles in 2010 (no taxanes administered due to underlying Charcot-Saloni-Tooth with peripheral neuropathy).  Adjuvant Femara (postmenopausal) initiated October 2010 with plan to treat ×10 years.  Genetic testing reportedly negative.  Developed osteopenia treated with Prolia beginning 2/27/13.  2. Recurrent/metastatic disease identified 10/8/17 involving thoracic spine with cord compression at T6, lumbosacral involvement, sternal and right sternoclavicular involvement.  Femara discontinued.  Radiation administered (in the [facility] system) to the thoracic spine beginning 10/19/17 treating T3-T9 to a dose of 24 gray in 6 fractions.  3. Evaluation with MRI 12/8/17 showing persistent T6 cord compression with persistent neurologic compromise requiring surgical treatment 12/11/17 with T6 laminectomy/corpectomy and T3-T9 fusion.  Pathology with metastatic carcinoma of breast origin, ER negative, NE negative, HER-2/peter negative (1+ IHC).  4. Right pulmonary embolism 10/21/17 treated with Lovenox 1 mg/kg (100 mg) every 12 hours.  No evidence of DVT.  Lovenox held due to right gluteus hematoma 3/23/18 through 4/6/18.  Transitioned to oral Eliquis 5mg bid 1/28/19 (as of 2/25/19, patient still using previous supply of Lovenox).  5. Cancer related pain previously on Duragesic 50 µg patch every 72 hours and Dilaudid 4 mg as needed for breakthrough pain.  Narcotics subsequently discontinued with improvement in pain in January 2018.  6. Radiation therapy to L3 dural metastasis and left humerus initiated 1/15/18 (10 fractions),  completed 1/26/18  7. Monthly Xgeva initiated 1/23/18  8. Mild hypercalcemia of malignancy  9. Initiation of palliative oral single agent Xeloda 2/7/18 (2000 mg a.m., 1500 mg p.m. for 14/21 days).  10. 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  In reviewing prior CT scans, has been present for some time.  Monitor for now, if it enlarges consider surgical excision.  11. Cumulative side effects from Xeloda with fatigue, anorexia, diarrhea, increased tearing.  Alteration in dose/schedule with cycle 11 to 1500 mg twice a day for 7 days on followed by 7 days off.    HISTORY OF PRESENT ILLNESS:  The patient is a 58 y.o. year old female who is here for follow-up with the above-mentioned history.    History of Present Illness the patient returns today in follow-up continuing on oral Xeloda and also continuing on monthly Xgeva that is due today with laboratory studies to review.  The patient will be due to resume Xeloda on 11/20/2019.  She is tolerating treatment well, is minimal hand-foot symptoms with skin discoloration/erythema but no skin peeling, only a few areas of skin cracking.  She does continue to use emollient cream frequently.  She does note some mild right shoulder pain recently, difficulty with abduction of her right shoulder.  She has had similar symptoms in her left shoulder for many years.  She notes plans to pursue an MRI of the right shoulder per Dr. gross filled and orthopedics later this week.  She has no other musculoskeletal pain.  She continues to remain very active.  She reports a stable appetite.  The rash involving her face cleared up and has not recurred.    Past Medical History:   Diagnosis Date   • Acute pulmonary embolism, cancer-related 10/2017   • Allergic rhinitis    • Arthritis     Right knee; left shoulder   • Cancer  (CMS/[facility]) 2010    Right breast   • Cancer (CMS/[facility]) 10/2017    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   • Charcot-Saloni-Tooth disease    • CPAP (continuous positive airway pressure) dependence    • GERD (gastroesophageal reflux disease)    • H/O Colon polyps    • H/O Uterine fibroid    • Heart murmur    • Hyperlipidemia    • Hypertension    • PONV (postoperative nausea and vomiting)      Past Surgical History:   Procedure Laterality Date   • BLADDER SURGERY      Bladder lift   • BREAST RECONSTRUCTION, BREAST TISSUE EXPANDER INSERTION Right    • BREAST SURGERY Right 2010    Mastectomy   •  SECTION  ,    • CHOLECYSTECTOMY     • COLONOSCOPY     • HERNIA REPAIR  2015    [facility]   • HYSTERECTOMY      Partial   • KNEE SURGERY Right    • LEG SURGERY Left     Broken   • MASTECTOMY Right    • AZ TREAT TIBIAL SHAFT FX, INTRAMED IMPLANT Left 2017    Procedure: TIBIA INTRAMEDULLARY NAIL/DON INSERTION;  Surgeon: Romero Shanks MD;  Location: [facility];  Service: Orthopedics   • THORACIC DECOMPRESSION POSTERIOR FUSION WITH INSTRUMENTATION N/A 2017    Procedure: T6 costotransversectomy and decompression with T3 to  T9 posterior spinal fusion with instrumentation with Jennifer Gonzalez and Nael Wilkes;  Surgeon: Quan Nayak MD;  Location: [facility];  Service:        ONCOLOGIC HISTORY:  (History from previous dates can be found in the separate document.)    Current Outpatient Medications on File Prior to Visit   Medication Sig Dispense Refill   • acetaminophen (TYLENOL) 500 MG tablet Take 500 mg by mouth Every 6 (Six) Hours As Needed for Mild Pain .     • calcium carbonate (OS-CARY) 600 MG tablet Take 600 mg by mouth 2 (Two) Times a Day With Meals.     • capecitabine (XELODA) 500 MG chemo tablet Take 3 tabs (1500 mg) by mouth twice a day for 7 days on then 7 days off. 84 tablet 3   • cholecalciferol (VITAMIN D3) 1000 units tablet Take 1,000 Units by mouth Daily.     •  diazePAM (VALIUM) 10 MG tablet Take 1 tablet by mouth Every 12 (Twelve) Hours As Needed for Anxiety. 10 tablet 1   • diphenoxylate-atropine (LOMOTIL) 2.5-0.025 MG per tablet Take 1 tablet by mouth 4 (Four) Times a Day As Needed for Diarrhea. 60 tablet 2   • ELIQUIS 5 MG tablet tablet TAKE 1 TABLET BY MOUTH EVERY 12 HOURS 60 tablet 6   • gabapentin (NEURONTIN) 300 MG capsule Take 1 capsule by mouth Daily. 90 capsule 3   • ketoconazole (NIZORAL) 2 % cream Apply  topically to the appropriate area as directed Daily. 15 g 0   • losartan (COZAAR) 50 MG tablet Take 1 tablet by mouth Daily. 90 tablet 2   • mesalamine (LIALDA) 1.2 g EC tablet Take 1 tablet by mouth 2 (Two) Times a Day. 180 tablet 0   • metaxalone (SKELAXIN) 800 MG tablet Take 1 tablet by mouth 3 (Three) Times a Day As Needed for Muscle Spasms. 30 tablet 3   • methylPREDNISolone (MEDROL, ZRAI,) 4 MG tablet Take as directed on package instructions. 21 each 0   • omeprazole (PriLOSEC) 40 MG capsule TAKE 1 CAPSULE DAILY 90 capsule 2   • ondansetron ODT (ZOFRAN-ODT) 8 MG disintegrating tablet Take 1 tablet by mouth Every 8 (Eight) Hours As Needed for Nausea or Vomiting. 30 tablet 1   • phenazopyridine (PYRIDIUM) 200 MG tablet Take 200 mg by mouth 3 (Three) Times a Day As Needed for bladder spasms.     • prochlorperazine (COMPAZINE) 10 MG tablet Take 1 tablet by mouth Every 6 (Six) Hours As Needed for Nausea or Vomiting. 30 tablet 0   • sennosides-docusate sodium (SENOKOT-S) 8.6-50 MG tablet Take 2 tablets by mouth 2 (Two) Times a Day. 90 tablet 2   • sucralfate (CARAFATE) 1 g tablet Take 1 tablet by mouth 4 (Four) Times a Day. 30 tablet 2   • temazepam (RESTORIL) 15 MG capsule Take 1 capsule by mouth At Night As Needed for Sleep. 30 capsule 2   • traMADol (ULTRAM) 50 MG tablet Take 1 tablet by mouth Every 8 (Eight) Hours As Needed for Moderate Pain . 90 tablet 0   • triamcinolone (KENALOG) 0.1 % cream Apply  topically to the appropriate area as directed 2 (Two)  Times a Day. 15 g 0   • trimethoprim (TRIMPEX) 100 MG tablet Take 100 mg by mouth Daily.  2   • Tuberculin PPD (APLISOL ID) Inject  into the skin.     • FLONASE ALLERGY RELIEF 50 MCG/ACT nasal spray 2 sprays into each nostril daily.  11   • Unable to find SWISH AND SPIT 5 ML PO Q 2 H PRN  0     No current facility-administered medications on file prior to visit.        ALLERGIES:     Allergies   Allergen Reactions   • Hydrocodone Nausea Only   • Morphine And Related Nausea And Vomiting     Social History     Socioeconomic History   • Marital status:      Spouse name: Murray   • Number of children: 3   • Years of education: College   • Highest education level: Not on file   Occupational History     Employer: [employer]     Employer: RETIRED   Tobacco Use   • Smoking status: Never Smoker   • Smokeless tobacco: Never Used   Substance and Sexual Activity   • Alcohol use: Yes     Comment: social   • Drug use: No   • Sexual activity: Defer     Family History   Problem Relation Age of Onset   • Heart disease Mother    • Hyperlipidemia Mother    • Hypertension Mother    • Diabetes Father    • Charcot-Saloni-Tooth disease Father    • Heart disease Father    • Hypertension Father    • Heart failure Father    • Diabetes Sister    • Heart disease Sister    • Hypertension Sister    • Heart disease Maternal Aunt    • Scoliosis Maternal Aunt    • Heart disease Maternal Uncle    • Diabetes Maternal Grandmother    • Heart disease Maternal Grandmother    • Hypertension Maternal Grandmother    • Uterine cancer Maternal Grandmother    • Heart disease Maternal Grandfather      Review of Systems   Constitutional: Positive for fatigue. Negative for activity change, appetite change, fever and unexpected weight change.   HENT: Negative for congestion, mouth sores, nosebleeds, sore throat and voice change.    Eyes: Negative for discharge.   Respiratory: Negative for cough, shortness of breath and wheezing.    Cardiovascular: Negative  for chest pain, palpitations and leg swelling.   Gastrointestinal: Negative for abdominal distention, abdominal pain, blood in stool, constipation, diarrhea, nausea and vomiting.   Endocrine: Negative for cold intolerance and heat intolerance.   Genitourinary: Negative for difficulty urinating, dysuria, flank pain, frequency, hematuria and urgency.   Musculoskeletal: Positive for arthralgias. Negative for back pain, joint swelling and myalgias.   Skin: Positive for rash. Negative for wound.   Neurological: Negative for dizziness, syncope, weakness, light-headedness, numbness and headaches.   Hematological: Negative for adenopathy. Does not bruise/bleed easily.   Psychiatric/Behavioral: Negative for confusion and sleep disturbance. The patient is not nervous/anxious.          Objective      Vitals:    11/18/19 1400   BP: 136/79   Pulse: 86   Resp: 16   Temp: 97.6 °F (36.4 °C)   SpO2: 97%      Current Status 11/18/2019   ECOG score 1   Pain 6/10    Physical Exam   Constitutional: She is oriented to person, place, and time. She appears well-developed and well-nourished.   HENT:   Mouth/Throat: Oropharynx is clear and moist.   Eyes: Conjunctivae are normal.   Neck: No thyromegaly present.   Cardiovascular: Normal rate and regular rhythm. Exam reveals no gallop and no friction rub.   No murmur heard.  Pulmonary/Chest: Breath sounds normal. No respiratory distress.   Abdominal: Soft. Bowel sounds are normal. She exhibits no distension. There is no tenderness.   Musculoskeletal: She exhibits no edema.   Lower extremity braces in place.  No significant lower extremity edema   Lymphadenopathy:        Head (right side): No submandibular adenopathy present.     She has no cervical adenopathy.     She has no axillary adenopathy.        Right: No inguinal and no supraclavicular adenopathy present.        Left: No inguinal and no supraclavicular adenopathy present.   Neurological: She is alert and oriented to person, place, and  time. No cranial nerve deficit.   Bilateral lower extremity strength, 4+/5   Skin: Skin is warm and dry. Rash noted.   Mild erythema involving the palms of the hands.  Mild degree of skin cracking with no peeling.    Psychiatric: She has a normal mood and affect. Her behavior is normal.       RECENT LABS:  Hematology WBC   Date Value Ref Range Status   11/18/2019 4.01 3.40 - 10.80 10*3/mm3 Final   11/04/2019 4.09 3.40 - 10.80 10*3/mm3 Final     RBC   Date Value Ref Range Status   11/18/2019 4.12 3.77 - 5.28 10*6/mm3 Final   11/04/2019 4.26 3.77 - 5.28 10*6/mm3 Final     Hemoglobin   Date Value Ref Range Status   11/18/2019 13.1 12.0 - 15.9 g/dL Final     Hematocrit   Date Value Ref Range Status   11/18/2019 39.6 34.0 - 46.6 % Final     Platelets   Date Value Ref Range Status   11/18/2019 265 140 - 450 10*3/mm3 Final        Lab Results   Component Value Date    GLUCOSE 108 (H) 11/18/2019    BUN 19 11/18/2019    CREATININE 1.07 (H) 11/18/2019    EGFRIFNONA 53 (L) 11/18/2019    EGFRIFAFRI 66 11/04/2019    BCR 17.8 11/18/2019    K 4.8 11/18/2019    CO2 30.3 (H) 11/18/2019    CALCIUM 9.3 11/18/2019    PROTENTOTREF 6.3 11/04/2019    ALBUMIN 4.40 11/18/2019    LABIL2 2.3 11/04/2019    AST 21 11/18/2019    ALT 17 11/18/2019           Assessment/Plan      1. Previous Stage Ib ([id number]) right breast cancer:  · Diagnosed May 2010 with excisional biopsy for invasive ductal carcinoma, 1.3 cm, grade 2, ER 90%, IN 80%, HER-2/peter negative (1+ IHC).    · Subsequent right mastectomy in July 2010 with no residual breast malignancy, 1/5 sentinel lymph node with micrometastasis (0.25 mm).    · Treated in the [facility] system with adjuvant AC ×4 cycles in 2010 (no taxanes administered due to underlying Charcot-Saloni-Tooth with peripheral neuropathy).    · Adjuvant Femara (postmenopausal) initiated October 2010 with plan to treat ×10 years.    · Genetic testing reportedly negative.    · Developed osteopenia treated with Prolia beginning  2/27/13. Subsequently discontinued due to identification of metastatic disease.  2. Recurrent/metastatic disease identified 10/8/17:  · Disease involving thoracic spine with cord compression at T6, lumbosacral involvement, sternal and right sternoclavicular involvement.    · Femara discontinued in 10/2017.    · Radiation administered (in the [facility] system) to the thoracic spine beginning 10/19/17 treating T3-T9 to a dose of 24 gray in 6 fractions.  · Evaluation with MRI 12/8/17 showing persistent T6 cord compression with persistent neurologic compromise requiring surgical treatment 12/11/17 with T6 laminectomy/corpectomy and T3-T9 fusion.  Pathology with metastatic carcinoma of breast origin, ER negative, IA negative, HER-2/peter negative (1+ IHC).  · Additional staging evaluation 12/8/17 with no evidence of visceral metastatic disease, bone scan showing involvement of thoracic spine, sternum, left humerus, mid frontal bone.  No plane film correlate of left humerus lesion.  MRI lumbar spine with small intradural L3 metastasis.  CA 15-3 12/6/17- 17.  · Palliative radiation therapy to L3 dural metastasis and left humerus initiated 1/15/18 (10 fractions), completed 1/26/18.  · Hypercalcemia of malignancy with calcium in the 10-11 range.  · Initiation of monthly Xgeva 1/23/18.  · Baseline CT scan 1/30/18 with no evidence of visceral involvement.  Cluster of nodular opacities in the right lower lobe suspected to be infectious or related to bronchiolitis. Bone scan 1/30/18 showed postsurgical change in the thoracic spine, stable uptake in the frontal bone, no new areas of disease.  · Initiation of palliative oral single agent Xeloda 2/7/18 2000 mg a.m., 1500 mg p.m. for 14/21 days.   · Following 3 cycles xeloda, bone scan 4/4/18 showed no change from the prior study.  CT scan 4/4/18 showed a small pericardial effusion of unclear significance as well as a subcutaneous nodule in the right lateral chest wall.  Subsequent  evaluation with echocardiogram 4/17/18 showed no evidence of pericardial effusion.  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  We discussed the possibility of surgical excision of the right subcutaneous chest wall lesion for more definitive diagnosis.  Reviewed previous CT images dating back to 12/8/17 and the lesion was present even at that time measuring around 1.7 cm although not commented on in the radiology report.  As this appears to be an indolent low-grade process unrelated to her breast cancer, recommendee foregoing surgical excision at this time and monitoring this area on future scans.  The patient agreed.    · Following 6 cycles of Xeloda, CT 6/6/18  showed stable findings, no evidence of progressive disease.  There was a comment regarding subcutaneous abnormality in the anterior abdominal wall and this was related to Lovenox injection sites.  Bone scan 6/6/18 showed no interval change.   · CT scan and bone scan 8/13/18 following 9 cycles of Xeloda showed stable findings with no evidence of significant visceral metastases.  Her bone lesions appear stable on bone scan.  The spindle cell neoplasm in her right chest wall actually decreased in size from 2 cm down to 1.6 cm.    · The patient experienced some symptoms of diarrhea, anorexia, generalized weakness during cycle 9 Xeloda it was unclear whether this was related to a viral gastroenteritis or toxicity from treatment.  Symptoms recurred during cycle 10 and treatment was cut short by 2 days.  Symptoms attributed to Xeloda.  With cycle 11, dose and schedule altered to 1500 mg twice daily for 7 days on followed by 7 days off .  · Most recent scans with bone scan 9/17/2019 showing no changes.  CT scan 9/17/2019 with stable bony lesions and resolved cluster of nodules right upper lobe and lower lobe.    · The  patient returns today continuing on treatment with Xeloda 1500 mg twice daily 7 days on followed by 7 days off.  She is tolerating treatment reasonably well.  She is due for monthly Xgeva today.  She will be due to resume Xeloda on 11/20/2019.  We did discuss pursuit of repeat CT and bone scan at a 3-month interval which will be due in 3 weeks prior to her return visit here in 4 weeks when she will again be due for Xgeva.  Clinically she is doing quite well.  3. Right pulmonary embolism:  · Diagnosed on CT angiogram 10/21/17 involving small right lower lobe pulmonary artery.  Lower extremity Dopplers negative.  · Bilateral lower extremity Dopplers negative again 12/5/17.  · Received chronic Lovenox 1 mg/kg twice per day, transition to oral Eliquis in February 2019, continuing on Eliquis 5 mg twice daily.  4. Cancer related pain:  · Previously receiving Duragesic 50 µg patch every 72 hours along with Dilaudid 4 mg as needed for breakthrough pain  · The patient's pain improved over time and she was able to discontinue both Duragesic and Dilaudid in the interval.  · Patient does take occasional Flexeril at bedtime due to back spasm/pain when she has been more active.  · The patient does have some occasional aggravation of her chronic back pain with significant rehabilitation activity and requires an occasional dose of Dilaudid.  No narcotic requirements recently.  5. Chemotherapy-induced diarrhea with subsequent C. difficile colitis:  · The patient developed initial diarrhea related to Xeloda at regular dosing.  · Symptoms improved on reduced dose Xeloda  · Flare of symptoms in October 2018 with apparent finding of C. difficile colitis by GI, treated with course of oral vancomycin with improvement in symptoms.  · Patient notes minimal intermittent diarrhea on Xeloda requiring occasional dosing of Imodium.  6. Traumatic left tibia/fibular fracture:  · Status post ORIF 12/6/17  · Specimen was sent for pathologic review,  negative for evidence of malignancy  7. Hypercalcemia:  · Suspect hypercalcemia of malignancy, calcium in  10-11 range previously.  · Calcium normalized following initiation of monthly Xgeva on 1/23/18.  8. Chemotherapy-induced mucositis:  · Patient had a minimal degree of mucositis with cycle 2.  The patient has magic mouthwash to use as needed.  No subsequent mucositis.  9. Recurrent UTI, bladder wall thickening on CT:  · Patient had an enterococcal UTI on 3/2/18 sensitive to nitrofurantoin and received treatment, unclear how long.  · Recurrent UTI 3/20/18 with urine culture growing Klebsiella, initially treated with nitrofurantoin, transitioned to Levaquin.  · CT 4/4/18 with diffuse bladder wall thickening with increased nodular thickening at the left base.  Referral to urogynecology Dr. May Johnson.  She was placed on a prophylactic dose of trimethoprim 100 mg daily, bladder wall thickening felt to be related to recent recurrent urinary tract infections.  · Patient with urinary symptoms, treated with course of Macrobid at the end of December 2018, urine culture however was negative 12/31/18.  · Patient was found to have Klebsiella UTI 7/29/2019 which was successfully treated with Macrobid with complete resolution of symptoms.  10.   Mobility:  · The patient underwent an intensive course of rehabilitation at [facility].  She graduated from her outpatient course November 2018.  · Overall the patient has improved dramatically in terms of mobility, now walking around 1 mile per day without assistance.  11.  Depression:  · The patient weaned herself off of Cymbalta and reports that her symptoms remain stable.  12. 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  Symptoms did improve and she continued on the same dose.    · Symptoms are very  manageable and mild currently with only slight erythema and some skin cracking in the hands.  13. Elevated liver function studies:  · Liver function studies increased 8/20/19 with ALT 98, AST 70, normal total bilirubin.  · Negative viral hepatitis A, B, and C panel 8/23/18  · Likely related to hepatic steatosis seen on CT, no definitive evidence of metastatic liver lesions.   · Subsequent improvement in LFTs  · Today, LFTs are normal  14. Chemotherapy induced leukopenia:  · WBC today 4.01 with normal differential  15. GERD:  · The patient noted at the last visit an increase in reflux symptoms despite omeprazole 20 mg daily and also noted increased cough possibly related.  She was advised to increase her omeprazole dose to 40 mg daily.  · Added Carafate 1 g 4 times daily 9/23/2019.  · Symptoms have improved overall.  She will inquire whether GI wishes to proceed with EGD when she is due for colonoscopy in January.  16. Insomnia:  · Patient with prior paradoxical reaction to Benadryl  · Improved on temazepam 15 mg nightly as needed.   17. Health maintenance:  · Patient notes that she has a history of colon polyps and was due for a follow-up colonoscopy on 9/12/2019.  We did discuss there is no necessity to pursue colonoscopy in the setting of her metastatic breast cancer.  The patient deferred the colonoscopy but does wish to proceed as scheduled in January 2020.  18. Facial rash:  · Presumed related to facial cream, subsequently resolved  19. Right shoulder pain:  · Patient was evaluated recently by Dr. shakila goodwin.  She has experienced difficulty over the past year and a half with her right shoulder although she has not complained of this in prior visits to our office.  She reports difficulty with abduction.  She was seen by Dr. shakila membreno recently and there are plans to pursue MRI later this week.  We will review the results.  This does not appear to be related to the patient's malignancy, prior bone scans with no  evidence of bone involvement from her breast cancer in this area.    Plan:  1. Continue oral Xeloda 1500 mg twice a day 7 days on followed by 7 days off (due to begin Xeloda again on 11/20/2019).  2. Monthly Xgeva today  3. Continue Eliquis 5 mg twice daily.  4. Continue use of emollient cream on the hands and feet and continue to wear socks and gloves at night  5. Continue omeprazole 40 mg daily and Carafate 1 g 4 times daily.    6. MRI of right shoulder later this week per orthopedics  7. In 3 weeks CT chest abdomen pelvis and bone scan  8. MD visit in 4 weeks with CBC, CMP, magnesium, phosphorus.  Patient will be due for Xgeva.      Patient continuing on high risk medication requiring intensive monitoring.

## 2019-11-19 ENCOUNTER — SPECIALTY PHARMACY (OUTPATIENT)
Dept: PHARMACY | Facility: HOSPITAL | Age: 59
End: 2019-11-19

## 2019-11-26 RX ORDER — APIXABAN 5 MG/1
TABLET, FILM COATED ORAL
Qty: 60 TABLET | Refills: 6 | Status: SHIPPED | OUTPATIENT
Start: 2019-11-26 | End: 2020-08-17

## 2019-12-09 ENCOUNTER — HOSPITAL ENCOUNTER (OUTPATIENT)
Dept: NUCLEAR MEDICINE | Facility: HOSPITAL | Age: 59
Discharge: HOME | End: 2019-12-09

## 2019-12-09 ENCOUNTER — HOSPITAL ENCOUNTER (OUTPATIENT)
Dept: CT IMAGING | Facility: HOSPITAL | Age: 59
Discharge: HOME | End: 2019-12-09
Admitting: INTERNAL MEDICINE

## 2019-12-09 DIAGNOSIS — Z17.1 MALIGNANT NEOPLASM OF OVERLAPPING SITES OF RIGHT BREAST IN FEMALE, ESTROGEN RECEPTOR NEGATIVE (HCC): ICD-10-CM

## 2019-12-09 DIAGNOSIS — C50.811 MALIGNANT NEOPLASM OF OVERLAPPING SITES OF RIGHT BREAST IN FEMALE, ESTROGEN RECEPTOR NEGATIVE (HCC): ICD-10-CM

## 2019-12-09 LAB — CREAT BLDA-MCNC: 1.1 MG/DL (ref 0.6–1.3)

## 2019-12-09 PROCEDURE — 82565 ASSAY OF CREATININE: CPT

## 2019-12-09 PROCEDURE — 0 TECHNETIUM MEDRONATE KIT: Performed by: INTERNAL MEDICINE

## 2019-12-09 PROCEDURE — 71260 CT THORAX DX C+: CPT

## 2019-12-09 PROCEDURE — A9503 TC99M MEDRONATE: HCPCS | Performed by: INTERNAL MEDICINE

## 2019-12-09 PROCEDURE — 25010000002 IOPAMIDOL 61 % SOLUTION: Performed by: INTERNAL MEDICINE

## 2019-12-09 PROCEDURE — 74177 CT ABD & PELVIS W/CONTRAST: CPT

## 2019-12-09 PROCEDURE — 78306 BONE IMAGING WHOLE BODY: CPT

## 2019-12-09 RX ORDER — TC 99M MEDRONATE 20 MG/10ML
22.5 INJECTION, POWDER, LYOPHILIZED, FOR SOLUTION INTRAVENOUS
Status: COMPLETED | OUTPATIENT
Start: 2019-12-09 | End: 2019-12-09

## 2019-12-09 RX ADMIN — Medication 22.5 MILLICURIE: at 07:46

## 2019-12-09 RX ADMIN — IOPAMIDOL 85 ML: 612 INJECTION, SOLUTION INTRAVENOUS at 09:00

## 2019-12-16 ENCOUNTER — INFUSION (OUTPATIENT)
Dept: ONCOLOGY | Facility: HOSPITAL | Age: 59
End: 2019-12-16

## 2019-12-16 ENCOUNTER — LAB (OUTPATIENT)
Dept: OTHER | Facility: HOSPITAL | Age: 59
End: 2019-12-16

## 2019-12-16 ENCOUNTER — OFFICE VISIT (OUTPATIENT)
Dept: ONCOLOGY | Facility: CLINIC | Age: 59
End: 2019-12-16

## 2019-12-16 VITALS
HEIGHT: 61 IN | WEIGHT: 195.3 LBS | RESPIRATION RATE: 16 BRPM | TEMPERATURE: 97.6 F | SYSTOLIC BLOOD PRESSURE: 126 MMHG | OXYGEN SATURATION: 97 % | DIASTOLIC BLOOD PRESSURE: 74 MMHG | BODY MASS INDEX: 36.87 KG/M2 | HEART RATE: 83 BPM

## 2019-12-16 DIAGNOSIS — Z17.1 MALIGNANT NEOPLASM OF OVERLAPPING SITES OF RIGHT BREAST IN FEMALE, ESTROGEN RECEPTOR NEGATIVE (HCC): ICD-10-CM

## 2019-12-16 DIAGNOSIS — C50.811 MALIGNANT NEOPLASM OF OVERLAPPING SITES OF RIGHT BREAST IN FEMALE, ESTROGEN RECEPTOR NEGATIVE (HCC): Primary | ICD-10-CM

## 2019-12-16 DIAGNOSIS — C50.811 MALIGNANT NEOPLASM OF OVERLAPPING SITES OF RIGHT BREAST IN FEMALE, ESTROGEN RECEPTOR NEGATIVE (HCC): ICD-10-CM

## 2019-12-16 DIAGNOSIS — Z17.1 MALIGNANT NEOPLASM OF OVERLAPPING SITES OF RIGHT BREAST IN FEMALE, ESTROGEN RECEPTOR NEGATIVE (HCC): Primary | ICD-10-CM

## 2019-12-16 LAB
ALBUMIN SERPL-MCNC: 4.1 G/DL (ref 3.5–5.2)
ALBUMIN/GLOB SERPL: 1.5 G/DL
ALP SERPL-CCNC: 55 U/L (ref 39–117)
ALT SERPL W P-5'-P-CCNC: 15 U/L (ref 1–33)
ANION GAP SERPL CALCULATED.3IONS-SCNC: 9.8 MMOL/L (ref 5–15)
AST SERPL-CCNC: 19 U/L (ref 1–32)
BASOPHILS # BLD AUTO: 0.03 10*3/MM3 (ref 0–0.2)
BASOPHILS NFR BLD AUTO: 0.7 % (ref 0–1.5)
BILIRUB SERPL-MCNC: 0.4 MG/DL (ref 0.1–1.2)
BUN BLD-MCNC: 21 MG/DL (ref 6–20)
BUN/CREAT SERPL: 18.1 (ref 7–25)
CALCIUM SPEC-SCNC: 9.1 MG/DL (ref 8.6–10.5)
CHLORIDE SERPL-SCNC: 105 MMOL/L (ref 98–107)
CO2 SERPL-SCNC: 28.2 MMOL/L (ref 22–29)
CREAT BLD-MCNC: 1.16 MG/DL (ref 0.57–1)
DEPRECATED RDW RBC AUTO: 61.7 FL (ref 37–54)
EOSINOPHIL # BLD AUTO: 0.14 10*3/MM3 (ref 0–0.4)
EOSINOPHIL NFR BLD AUTO: 3.4 % (ref 0.3–6.2)
ERYTHROCYTE [DISTWIDTH] IN BLOOD BY AUTOMATED COUNT: 17.2 % (ref 12.3–15.4)
GFR SERPL CREATININE-BSD FRML MDRD: 48 ML/MIN/1.73
GLOBULIN UR ELPH-MCNC: 2.8 GM/DL
GLUCOSE BLD-MCNC: 110 MG/DL (ref 65–99)
HCT VFR BLD AUTO: 38.9 % (ref 34–46.6)
HGB BLD-MCNC: 12.8 G/DL (ref 12–15.9)
IMM GRANULOCYTES # BLD AUTO: 0.01 10*3/MM3 (ref 0–0.05)
IMM GRANULOCYTES NFR BLD AUTO: 0.2 % (ref 0–0.5)
LYMPHOCYTES # BLD AUTO: 1.1 10*3/MM3 (ref 0.7–3.1)
LYMPHOCYTES NFR BLD AUTO: 26.4 % (ref 19.6–45.3)
MAGNESIUM SERPL-MCNC: 2.4 MG/DL (ref 1.6–2.6)
MCH RBC QN AUTO: 32.3 PG (ref 26.6–33)
MCHC RBC AUTO-ENTMCNC: 32.9 G/DL (ref 31.5–35.7)
MCV RBC AUTO: 98.2 FL (ref 79–97)
MONOCYTES # BLD AUTO: 0.52 10*3/MM3 (ref 0.1–0.9)
MONOCYTES NFR BLD AUTO: 12.5 % (ref 5–12)
NEUTROPHILS # BLD AUTO: 2.36 10*3/MM3 (ref 1.7–7)
NEUTROPHILS NFR BLD AUTO: 56.8 % (ref 42.7–76)
NRBC BLD AUTO-RTO: 0 /100 WBC (ref 0–0.2)
PHOSPHATE SERPL-MCNC: 3.3 MG/DL (ref 2.5–4.5)
PLATELET # BLD AUTO: 233 10*3/MM3 (ref 140–450)
PMV BLD AUTO: 10.6 FL (ref 6–12)
POTASSIUM BLD-SCNC: 5 MMOL/L (ref 3.5–5.2)
PROT SERPL-MCNC: 6.9 G/DL (ref 6–8.5)
RBC # BLD AUTO: 3.96 10*6/MM3 (ref 3.77–5.28)
SODIUM BLD-SCNC: 143 MMOL/L (ref 136–145)
WBC NRBC COR # BLD: 4.16 10*3/MM3 (ref 3.4–10.8)

## 2019-12-16 PROCEDURE — 83735 ASSAY OF MAGNESIUM: CPT | Performed by: INTERNAL MEDICINE

## 2019-12-16 PROCEDURE — 25010000002 DENOSUMAB 120 MG/1.7ML SOLUTION: Performed by: INTERNAL MEDICINE

## 2019-12-16 PROCEDURE — 85025 COMPLETE CBC W/AUTO DIFF WBC: CPT | Performed by: INTERNAL MEDICINE

## 2019-12-16 PROCEDURE — 84100 ASSAY OF PHOSPHORUS: CPT | Performed by: INTERNAL MEDICINE

## 2019-12-16 PROCEDURE — 80053 COMPREHEN METABOLIC PANEL: CPT | Performed by: INTERNAL MEDICINE

## 2019-12-16 PROCEDURE — 96372 THER/PROPH/DIAG INJ SC/IM: CPT | Performed by: INTERNAL MEDICINE

## 2019-12-16 PROCEDURE — 36415 COLL VENOUS BLD VENIPUNCTURE: CPT

## 2019-12-16 PROCEDURE — 99214 OFFICE O/P EST MOD 30 MIN: CPT | Performed by: INTERNAL MEDICINE

## 2019-12-16 RX ADMIN — DENOSUMAB 120 MG: 120 INJECTION SUBCUTANEOUS at 12:06

## 2019-12-16 NOTE — PROGRESS NOTES
Subjective .     REASONS FOR FOLLOWUP:    1. Stage Ib ([id number]) right breast cancer: Diagnosed May 2010 with excisional biopsy for invasive ductal carcinoma, 1.3 cm, grade 2, ER 90%, SD 80%, HER-2/peter negative (1+ IHC).  Subsequent right mastectomy in July 2010 with no residual breast malignancy, 1/5 sentinel lymph node with micrometastasis (0.25 mm).  Treated in the [facility] system with adjuvant AC ×4 cycles in 2010 (no taxanes administered due to underlying Charcot-Saloni-Tooth with peripheral neuropathy).  Adjuvant Femara (postmenopausal) initiated October 2010 with plan to treat ×10 years.  Genetic testing reportedly negative.  Developed osteopenia treated with Prolia beginning 2/27/13.  2. Recurrent/metastatic disease identified 10/8/17 involving thoracic spine with cord compression at T6, lumbosacral involvement, sternal and right sternoclavicular involvement.  Femara discontinued.  Radiation administered (in the [facility] system) to the thoracic spine beginning 10/19/17 treating T3-T9 to a dose of 24 gray in 6 fractions.  3. Evaluation with MRI 12/8/17 showing persistent T6 cord compression with persistent neurologic compromise requiring surgical treatment 12/11/17 with T6 laminectomy/corpectomy and T3-T9 fusion.  Pathology with metastatic carcinoma of breast origin, ER negative, SD negative, HER-2/peter negative (1+ IHC).  4. Right pulmonary embolism 10/21/17 treated with Lovenox 1 mg/kg (100 mg) every 12 hours.  No evidence of DVT.  Lovenox held due to right gluteus hematoma 3/23/18 through 4/6/18.  Transitioned to oral Eliquis 5mg bid 1/28/19 (as of 2/25/19, patient still using previous supply of Lovenox).  5. Cancer related pain previously on Duragesic 50 µg patch every 72 hours and Dilaudid 4 mg as needed for breakthrough pain.  Narcotics subsequently discontinued with improvement in pain in January 2018.  6. Radiation therapy to L3 dural metastasis and left humerus initiated 1/15/18 (10 fractions),  completed 1/26/18  7. Monthly Xgeva initiated 1/23/18  8. Mild hypercalcemia of malignancy  9. Initiation of palliative oral single agent Xeloda 2/7/18 (2000 mg a.m., 1500 mg p.m. for 14/21 days).  10. 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  In reviewing prior CT scans, has been present for some time.  Monitor for now, if it enlarges consider surgical excision.  11. Cumulative side effects from Xeloda with fatigue, anorexia, diarrhea, increased tearing.  Alteration in dose/schedule with cycle 11 to 1500 mg twice a day for 7 days on followed by 7 days off.    HISTORY OF PRESENT ILLNESS:  The patient is a 59 y.o. year old female who is here for follow-up with the above-mentioned history.    History of Present Illness the patient returns today in follow-up continuing on oral Xeloda and also continuing on monthly Xgeva that is due today with laboratory studies in addition to CT scans and bone scan to review.  The patient continues to tolerate Xeloda well.  She has continued hand-foot symptoms with mainly erythema, no current skin peeling and she continues to use emollient cream frequently.  She has some shoulder discomfort which is attributed to arthritic pain and muscular strain from using her crutches.  She did undergo an MRI apparently at an outside facility recently and we will obtain that result.  She has discussed potential stem cell injection with Dr. Forrester which may occur in January.    Past Medical History:   Diagnosis Date   • Acute pulmonary embolism, cancer-related 10/2017   • Allergic rhinitis    • Arthritis     Right knee; left shoulder   • Cancer (CMS/HCC) 05/2010    Right breast   • Cancer (CMS/HCC) 10/2017    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   • Charcot-Saloni-Tooth disease    • CPAP (continuous positive airway pressure)  dependence    • GERD (gastroesophageal reflux disease)    • H/O Colon polyps    • H/O Uterine fibroid    • Heart murmur    • Hyperlipidemia    • Hypertension    • PONV (postoperative nausea and vomiting)      Past Surgical History:   Procedure Laterality Date   • BLADDER SURGERY      Bladder lift   • BREAST RECONSTRUCTION, BREAST TISSUE EXPANDER INSERTION Right 2010   • BREAST SURGERY Right 2010    Mastectomy   •  SECTION  ,    • CHOLECYSTECTOMY     • COLONOSCOPY     • HERNIA REPAIR  2015    [facility]   • HYSTERECTOMY      Partial   • KNEE SURGERY Right    • LEG SURGERY Left     Broken   • MASTECTOMY Right    • NC TREAT TIBIAL SHAFT FX, INTRAMED IMPLANT Left 2017    Procedure: TIBIA INTRAMEDULLARY NAIL/DON INSERTION;  Surgeon: Romero Shanks MD;  Location: [facility];  Service: Orthopedics   • THORACIC DECOMPRESSION POSTERIOR FUSION WITH INSTRUMENTATION N/A 2017    Procedure: T6 costotransversectomy and decompression with T3 to  T9 posterior spinal fusion with instrumentation with Jennifer Gonzalez and Nael Wilkes;  Surgeon: Quan Nayak MD;  Location: [facility];  Service:        ONCOLOGIC HISTORY:  (History from previous dates can be found in the separate document.)    Current Outpatient Medications on File Prior to Visit   Medication Sig Dispense Refill   • acetaminophen (TYLENOL) 500 MG tablet Take 500 mg by mouth Every 6 (Six) Hours As Needed for Mild Pain .     • calcium carbonate (OS-CARY) 600 MG tablet Take 600 mg by mouth 2 (Two) Times a Day With Meals.     • capecitabine (XELODA) 500 MG chemo tablet Take 3 tabs (1500 mg) by mouth twice a day for 7 days on then 7 days off. 84 tablet 3   • cholecalciferol (VITAMIN D3) 1000 units tablet Take 1,000 Units by mouth Daily.     • diazePAM (VALIUM) 10 MG tablet Take 1 tablet by mouth Every 12 (Twelve) Hours As Needed for Anxiety. 10 tablet 1   • diphenoxylate-atropine (LOMOTIL) 2.5-0.025 MG per tablet Take 1  tablet by mouth 4 (Four) Times a Day As Needed for Diarrhea. 60 tablet 2   • ELIQUIS 5 MG tablet tablet TAKE 1 TABLET BY MOUTH EVERY 12 HOURS 60 tablet 6   • FLONASE ALLERGY RELIEF 50 MCG/ACT nasal spray 2 sprays into each nostril daily.  11   • gabapentin (NEURONTIN) 300 MG capsule Take 1 capsule by mouth Daily. 90 capsule 3   • ketoconazole (NIZORAL) 2 % cream Apply  topically to the appropriate area as directed Daily. 15 g 0   • losartan (COZAAR) 50 MG tablet Take 1 tablet by mouth Daily. 90 tablet 2   • mesalamine (LIALDA) 1.2 g EC tablet Take 1 tablet by mouth 2 (Two) Times a Day. 180 tablet 0   • metaxalone (SKELAXIN) 800 MG tablet Take 1 tablet by mouth 3 (Three) Times a Day As Needed for Muscle Spasms. 30 tablet 3   • methylPREDNISolone (MEDROL, ZARI,) 4 MG tablet Take as directed on package instructions. 21 each 0   • omeprazole (PriLOSEC) 40 MG capsule TAKE 1 CAPSULE DAILY 90 capsule 2   • ondansetron ODT (ZOFRAN-ODT) 8 MG disintegrating tablet Take 1 tablet by mouth Every 8 (Eight) Hours As Needed for Nausea or Vomiting. 30 tablet 1   • phenazopyridine (PYRIDIUM) 200 MG tablet Take 200 mg by mouth 3 (Three) Times a Day As Needed for bladder spasms.     • prochlorperazine (COMPAZINE) 10 MG tablet Take 1 tablet by mouth Every 6 (Six) Hours As Needed for Nausea or Vomiting. 30 tablet 0   • sennosides-docusate sodium (SENOKOT-S) 8.6-50 MG tablet Take 2 tablets by mouth 2 (Two) Times a Day. 90 tablet 2   • sucralfate (CARAFATE) 1 g tablet Take 1 tablet by mouth 4 (Four) Times a Day. 30 tablet 2   • temazepam (RESTORIL) 15 MG capsule Take 1 capsule by mouth At Night As Needed for Sleep. 30 capsule 2   • traMADol (ULTRAM) 50 MG tablet Take 1 tablet by mouth Every 8 (Eight) Hours As Needed for Moderate Pain . 90 tablet 0   • triamcinolone (KENALOG) 0.1 % cream Apply  topically to the appropriate area as directed 2 (Two) Times a Day. 15 g 0   • trimethoprim (TRIMPEX) 100 MG tablet Take 100 mg by mouth Daily.  2   •  Tuberculin PPD (APLISOL ID) Inject  into the skin.     • Unable to find SWISH AND SPIT 5 ML PO Q 2 H PRN  0     No current facility-administered medications on file prior to visit.        ALLERGIES:     Allergies   Allergen Reactions   • Hydrocodone Nausea Only   • Morphine And Related Nausea And Vomiting     Social History     Socioeconomic History   • Marital status:      Spouse name: Murray   • Number of children: 3   • Years of education: College   • Highest education level: Not on file   Occupational History     Employer: [employer]     Employer: RETIRED   Tobacco Use   • Smoking status: Never Smoker   • Smokeless tobacco: Never Used   Substance and Sexual Activity   • Alcohol use: Yes     Comment: social   • Drug use: No   • Sexual activity: Defer     Family History   Problem Relation Age of Onset   • Heart disease Mother    • Hyperlipidemia Mother    • Hypertension Mother    • Diabetes Father    • Charcot-Saloni-Tooth disease Father    • Heart disease Father    • Hypertension Father    • Heart failure Father    • Diabetes Sister    • Heart disease Sister    • Hypertension Sister    • Heart disease Maternal Aunt    • Scoliosis Maternal Aunt    • Heart disease Maternal Uncle    • Diabetes Maternal Grandmother    • Heart disease Maternal Grandmother    • Hypertension Maternal Grandmother    • Uterine cancer Maternal Grandmother    • Heart disease Maternal Grandfather      Review of Systems   Constitutional: Positive for fatigue. Negative for activity change, appetite change, fever and unexpected weight change.   HENT: Negative for congestion, mouth sores, nosebleeds, sore throat and voice change.    Eyes: Negative for discharge.   Respiratory: Negative for cough, shortness of breath and wheezing.    Cardiovascular: Negative for chest pain, palpitations and leg swelling.   Gastrointestinal: Negative for abdominal distention, abdominal pain, blood in stool, constipation, diarrhea, nausea and vomiting.    Endocrine: Negative for cold intolerance and heat intolerance.   Genitourinary: Negative for difficulty urinating, dysuria, flank pain, frequency, hematuria and urgency.   Musculoskeletal: Positive for arthralgias. Negative for back pain, joint swelling and myalgias.   Skin: Positive for rash. Negative for wound.   Neurological: Negative for dizziness, syncope, weakness, light-headedness, numbness and headaches.   Hematological: Negative for adenopathy. Does not bruise/bleed easily.   Psychiatric/Behavioral: Negative for confusion and sleep disturbance. The patient is not nervous/anxious.          Objective      Vitals:    12/16/19 1120   BP: 126/74   Pulse: 83   Resp: 16   Temp: 97.6 °F (36.4 °C)   SpO2: 97%      Current Status 12/16/2019   ECOG score 0   Pain 6/10    Physical Exam   Constitutional: She is oriented to person, place, and time. She appears well-developed and well-nourished.   HENT:   Mouth/Throat: Oropharynx is clear and moist.   Eyes: Conjunctivae are normal.   Neck: No thyromegaly present.   Cardiovascular: Normal rate and regular rhythm. Exam reveals no gallop and no friction rub.   No murmur heard.  Pulmonary/Chest: Breath sounds normal. No respiratory distress.   Abdominal: Soft. Bowel sounds are normal. She exhibits no distension. There is no tenderness.   Musculoskeletal: She exhibits no edema.   Lower extremity braces in place.  No significant lower extremity edema   Lymphadenopathy:        Head (right side): No submandibular adenopathy present.     She has no cervical adenopathy.     She has no axillary adenopathy.        Right: No inguinal and no supraclavicular adenopathy present.        Left: No inguinal and no supraclavicular adenopathy present.   Neurological: She is alert and oriented to person, place, and time. She displays normal reflexes. No cranial nerve deficit. She exhibits normal muscle tone.   Bilateral lower extremity strength, 4+/5   Skin: Skin is warm and dry. Rash noted.    Mild erythema involving the palms of the hands.  Mild degree of skin cracking with no peeling.    Psychiatric: She has a normal mood and affect. Her behavior is normal.   The patient was examined today, unchanged from above.    RECENT LABS:  Hematology WBC   Date Value Ref Range Status   12/16/2019 4.16 3.40 - 10.80 10*3/mm3 Final   11/04/2019 4.09 3.40 - 10.80 10*3/mm3 Final     RBC   Date Value Ref Range Status   12/16/2019 3.96 3.77 - 5.28 10*6/mm3 Final   11/04/2019 4.26 3.77 - 5.28 10*6/mm3 Final     Hemoglobin   Date Value Ref Range Status   12/16/2019 12.8 12.0 - 15.9 g/dL Final     Hematocrit   Date Value Ref Range Status   12/16/2019 38.9 34.0 - 46.6 % Final     Platelets   Date Value Ref Range Status   12/16/2019 233 140 - 450 10*3/mm3 Final        Lab Results   Component Value Date    GLUCOSE 110 (H) 12/16/2019    BUN 21 (H) 12/16/2019    CREATININE 1.16 (H) 12/16/2019    EGFRIFNONA 48 (L) 12/16/2019    EGFRIFAFRI 66 11/04/2019    BCR 18.1 12/16/2019    K 5.0 12/16/2019    CO2 28.2 12/16/2019    CALCIUM 9.1 12/16/2019    PROTENTOTREF 6.3 11/04/2019    ALBUMIN 4.10 12/16/2019    LABIL2 2.3 11/04/2019    AST 19 12/16/2019    ALT 15 12/16/2019     CT chest abdomen pelvis and bone scan 12/9/2019 as outlined below      Assessment/Plan      1. Previous Stage Ib ([id number]) right breast cancer:  · Diagnosed May 2010 with excisional biopsy for invasive ductal carcinoma, 1.3 cm, grade 2, ER 90%, IL 80%, HER-2/peter negative (1+ IHC).    · Subsequent right mastectomy in July 2010 with no residual breast malignancy, 1/5 sentinel lymph node with micrometastasis (0.25 mm).    · Treated in the [facility] system with adjuvant AC ×4 cycles in 2010 (no taxanes administered due to underlying Charcot-Saloni-Tooth with peripheral neuropathy).    · Adjuvant Femara (postmenopausal) initiated October 2010 with plan to treat ×10 years.    · Genetic testing reportedly negative.    · Developed osteopenia treated with Prolia beginning  2/27/13. Subsequently discontinued due to identification of metastatic disease.  2. Recurrent/metastatic disease identified 10/8/17:  · Disease involving thoracic spine with cord compression at T6, lumbosacral involvement, sternal and right sternoclavicular involvement.    · Femara discontinued in 10/2017.    · Radiation administered (in the [facility] system) to the thoracic spine beginning 10/19/17 treating T3-T9 to a dose of 24 gray in 6 fractions.  · Evaluation with MRI 12/8/17 showing persistent T6 cord compression with persistent neurologic compromise requiring surgical treatment 12/11/17 with T6 laminectomy/corpectomy and T3-T9 fusion.  Pathology with metastatic carcinoma of breast origin, ER negative, NV negative, HER-2/peter negative (1+ IHC).  · Additional staging evaluation 12/8/17 with no evidence of visceral metastatic disease, bone scan showing involvement of thoracic spine, sternum, left humerus, mid frontal bone.  No plane film correlate of left humerus lesion.  MRI lumbar spine with small intradural L3 metastasis.  CA 15-3 12/6/17- 17.  · Palliative radiation therapy to L3 dural metastasis and left humerus initiated 1/15/18 (10 fractions), completed 1/26/18.  · Hypercalcemia of malignancy with calcium in the 10-11 range.  · Initiation of monthly Xgeva 1/23/18.  · Baseline CT scan 1/30/18 with no evidence of visceral involvement.  Cluster of nodular opacities in the right lower lobe suspected to be infectious or related to bronchiolitis. Bone scan 1/30/18 showed postsurgical change in the thoracic spine, stable uptake in the frontal bone, no new areas of disease.  · Initiation of palliative oral single agent Xeloda 2/7/18 2000 mg a.m., 1500 mg p.m. for 14/21 days.   · Following 3 cycles xeloda, bone scan 4/4/18 showed no change from the prior study.  CT scan 4/4/18 showed a small pericardial effusion of unclear significance as well as a subcutaneous nodule in the right lateral chest wall.  Subsequent  evaluation with echocardiogram 4/17/18 showed no evidence of pericardial effusion.  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  We discussed the possibility of surgical excision of the right subcutaneous chest wall lesion for more definitive diagnosis.  Reviewed previous CT images dating back to 12/8/17 and the lesion was present even at that time measuring around 1.7 cm although not commented on in the radiology report.  As this appears to be an indolent low-grade process unrelated to her breast cancer, recommendee foregoing surgical excision at this time and monitoring this area on future scans.  The patient agreed.    · Following 6 cycles of Xeloda, CT 6/6/18  showed stable findings, no evidence of progressive disease.  There was a comment regarding subcutaneous abnormality in the anterior abdominal wall and this was related to Lovenox injection sites.  Bone scan 6/6/18 showed no interval change.   · CT scan and bone scan 8/13/18 following 9 cycles of Xeloda showed stable findings with no evidence of significant visceral metastases.  Her bone lesions appear stable on bone scan.  The spindle cell neoplasm in her right chest wall actually decreased in size from 2 cm down to 1.6 cm.    · The patient experienced some symptoms of diarrhea, anorexia, generalized weakness during cycle 9 Xeloda it was unclear whether this was related to a viral gastroenteritis or toxicity from treatment.  Symptoms recurred during cycle 10 and treatment was cut short by 2 days.  Symptoms attributed to Xeloda.  With cycle 11, dose and schedule altered to 1500 mg twice daily for 7 days on followed by 7 days off .  · Most recent scans with bone scan 12/9/2019 showing no changes.  CT scan 12/9/2019 with no significant changes..    · The patient returns today continuing on treatment with Xeloda 1500  mg twice daily 7 days on followed by 7 days off.  She is tolerating treatment reasonably well.  She is due for monthly Xgeva today.  We did review her scans as noted above at a 3-month interval from 12/9/2019 including CT and bone scan.  There are no significant changes with stable disease.  We will continue on the current treatment.  She will return in 4 weeks for nurse practitioner visit and again in 8 weeks for MD visit when she is due for Xgeva and we will plan to repeat CT and bone scan prior to her visit in 12 weeks.  3. Right pulmonary embolism:  · Diagnosed on CT angiogram 10/21/17 involving small right lower lobe pulmonary artery.  Lower extremity Dopplers negative.  · Bilateral lower extremity Dopplers negative again 12/5/17.  · Received chronic Lovenox 1 mg/kg twice per day, transition to oral Eliquis in February 2019, continuing on Eliquis 5 mg twice daily.  4. Cancer related pain:  · Previously receiving Duragesic 50 µg patch every 72 hours along with Dilaudid 4 mg as needed for breakthrough pain  · The patient's pain improved over time and she was able to discontinue both Duragesic and Dilaudid in the interval.  · Patient does take occasional Flexeril at bedtime due to back spasm/pain when she has been more active.  · The patient does have some occasional aggravation of her chronic back pain with significant rehabilitation activity and requires an occasional dose of Dilaudid.  No narcotic requirements recently.  5. Chemotherapy-induced diarrhea with subsequent C. difficile colitis:  · The patient developed initial diarrhea related to Xeloda at regular dosing.  · Symptoms improved on reduced dose Xeloda  · Flare of symptoms in October 2018 with apparent finding of C. difficile colitis by GI, treated with course of oral vancomycin with improvement in symptoms.  · Patient notes minimal intermittent diarrhea on Xeloda requiring occasional dosing of Imodium.  6. Traumatic left tibia/fibular  fracture:  · Status post ORIF 12/6/17  · Specimen was sent for pathologic review, negative for evidence of malignancy  7. Hypercalcemia:  · Suspect hypercalcemia of malignancy, calcium in  10-11 range previously.  · Calcium normalized following initiation of monthly Xgeva on 1/23/18.  8. Chemotherapy-induced mucositis:  · Patient had a minimal degree of mucositis with cycle 2.  The patient has magic mouthwash to use as needed.  No subsequent mucositis.  9. Recurrent UTI, bladder wall thickening on CT:  · Patient had an enterococcal UTI on 3/2/18 sensitive to nitrofurantoin and received treatment, unclear how long.  · Recurrent UTI 3/20/18 with urine culture growing Klebsiella, initially treated with nitrofurantoin, transitioned to Levaquin.  · CT 4/4/18 with diffuse bladder wall thickening with increased nodular thickening at the left base.  Referral to urogynecology Dr. May Johnson.  She was placed on a prophylactic dose of trimethoprim 100 mg daily, bladder wall thickening felt to be related to recent recurrent urinary tract infections.  · Patient with urinary symptoms, treated with course of Macrobid at the end of December 2018, urine culture however was negative 12/31/18.  · Patient was found to have Klebsiella UTI 7/29/2019 which was successfully treated with Macrobid with complete resolution of symptoms.  10.   Mobility:  · The patient underwent an intensive course of rehabilitation at [facility].  She graduated from her outpatient course November 2018.  · Overall the patient has improved dramatically in terms of mobility, now walking around 1 mile per day without assistance.  11.  Depression:  · The patient weaned herself off of Cymbalta and reports that her symptoms remain stable.  12. 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   Symptoms did improve and she continued on the same dose.    · Symptoms are very manageable and mild currently with only slight erythema and some skin cracking in the hands.  13. Elevated liver function studies:  · Liver function studies increased 8/20/19 with ALT 98, AST 70, normal total bilirubin.  · Negative viral hepatitis A, B, and C panel 8/23/18  · Likely related to hepatic steatosis seen on CT, no definitive evidence of metastatic liver lesions.   · Subsequent improvement in LFTs  · Today, LFTs are normal  14. Chemotherapy induced leukopenia:  · WBC today 4.16 with normal differential  15. GERD:  · The patient noted at the last visit an increase in reflux symptoms despite omeprazole 20 mg daily and also noted increased cough possibly related.  She was advised to increase her omeprazole dose to 40 mg daily.  · Added Carafate 1 g 4 times daily 9/23/2019.  · Symptoms have improved overall.  She will inquire whether GI wishes to proceed with EGD when she is due for colonoscopy in January.  16. Insomnia:  · Patient with prior paradoxical reaction to Benadryl  · Improved on temazepam 15 mg nightly as needed.   17. Health maintenance:  · Patient notes that she has a history of colon polyps and was due for a follow-up colonoscopy on 9/12/2019.  We did discuss there is no necessity to pursue colonoscopy in the setting of her metastatic breast cancer.  The patient deferred the colonoscopy but does wish to proceed as scheduled in January 2020.  18. Right shoulder pain:  · Patient was evaluated by Dr Forrester.  She has experienced difficulty over the past year and a half with her right shoulder although she has not complained of this in prior visits to our office.  She reports difficulty with abduction.    · The patient did undergo recent MRI of her right shoulder at an outside facility recently and we will obtain that result.  She has discussed possible stem cell injection in January.  By current scans there is no  evidence of involvement from her breast cancer in this area.    Plan:  1. Continue oral Xeloda 1500 mg twice a day 7 days on followed by 7 days off.  2. Monthly Xgeva today  3. Continue Eliquis 5 mg twice daily.  4. Continue use of emollient cream on the hands and feet and continue to wear socks and gloves at night  5. Continue omeprazole 40 mg daily and Carafate 1 g 4 times daily.    6. Obtain report from recent MRI right shoulder performed at outside facility.  7. In 4 weeks nurse practitioner visit with CBC, CMP, magnesium, phosphorus.  Patient will be due for monthly Xgeva.  8. MD visit in 8 weeks with CBC, CMP, magnesium, phosphorus.  Patient will be due for monthly Xgeva.  9. In 11 weeks CT chest abdomen pelvis and bone scan  10. 12 weeks MD visit with CBC, CMP, magnesium, phosphorus.  Patient will be due for monthly Xgeva.    Patient continuing on high risk medication requiring intensive monitoring.

## 2019-12-16 NOTE — PROGRESS NOTES
"  Subjective     HISTORY OF PRESENT ILLNESS:     History of Present Illness  ***    Past Medical History, Past Surgical History, Social History, Family History have been reviewed and are without significant changes except as mentioned.    Review of Systems   A comprehensive 14 point review of systems was performed and was negative except as mentioned.    Medications:  The current medication list was reviewed in the EMR    ALLERGIES:    Allergies   Allergen Reactions   • Hydrocodone Nausea Only   • Morphine And Related Nausea And Vomiting       Objective      Vitals:    12/16/19 1120   BP: 126/74   Pulse: 83   Resp: 16   Temp: 97.6 °F (36.4 °C)   TempSrc: Oral   SpO2: 97%   Weight: 88.6 kg (195 lb 4.8 oz)   Height: 154.9 cm (60.98\")   PainSc:   5   PainLoc: Shoulder  Comment: both shoulders     Current Status 12/16/2019   ECOG score 0       Physical Exam  ***    RECENT LABS:  Hematology WBC   Date Value Ref Range Status   12/16/2019 4.16 3.40 - 10.80 10*3/mm3 Final   11/04/2019 4.09 3.40 - 10.80 10*3/mm3 Final     RBC   Date Value Ref Range Status   12/16/2019 3.96 3.77 - 5.28 10*6/mm3 Final   11/04/2019 4.26 3.77 - 5.28 10*6/mm3 Final     Hemoglobin   Date Value Ref Range Status   12/16/2019 12.8 12.0 - 15.9 g/dL Final     Hematocrit   Date Value Ref Range Status   12/16/2019 38.9 34.0 - 46.6 % Final     Platelets   Date Value Ref Range Status   12/16/2019 233 140 - 450 10*3/mm3 Final              Assessment/Plan   ***                  12/16/2019      CC:          "

## 2019-12-18 ENCOUNTER — SPECIALTY PHARMACY (OUTPATIENT)
Dept: PHARMACY | Facility: HOSPITAL | Age: 59
End: 2019-12-18

## 2019-12-19 RX ORDER — CAPECITABINE 500 MG/1
TABLET, FILM COATED ORAL
Qty: 84 TABLET | Refills: 3 | Status: SHIPPED | OUTPATIENT
Start: 2019-12-19 | End: 2020-04-09 | Stop reason: SDUPTHER

## 2019-12-19 NOTE — TELEPHONE ENCOUNTER
Email rec from Jean Paul, Pharmacist at [facility]-asking for refills on pts Xeloda. Per last office note from Dr Hancock, pt is to continue. I have escribed a new rx to  Sam.

## 2019-12-30 NOTE — PROGRESS NOTES
Specialty Pharmacy Note      Name:  Martha Davey  :  1960  Date:  2019         Past Medical History:   Diagnosis Date   • Acute pulmonary embolism, cancer-related 10/2017   • Allergic rhinitis    • Arthritis     Right knee; left shoulder   • Cancer (CMS/[facility]) 2010    Right breast   • Cancer (CMS/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   • Charcot-Saloni-Tooth disease    • CPAP (continuous positive airway pressure) dependence    • GERD (gastroesophageal reflux disease)    • H/O Colon polyps    • H/O Uterine fibroid    • Heart murmur    • Hyperlipidemia    • Hypertension    • PONV (postoperative nausea and vomiting)        Past Surgical History:   Procedure Laterality Date   • BLADDER SURGERY      Bladder lift   • BREAST RECONSTRUCTION, BREAST TISSUE EXPANDER INSERTION Right    • BREAST SURGERY Right 2010    Mastectomy   •  SECTION  ,    • CHOLECYSTECTOMY     • COLONOSCOPY     • HERNIA REPAIR  2015    [facility]   • HYSTERECTOMY      Partial   • KNEE SURGERY Right    • LEG SURGERY Left     Broken   • MASTECTOMY Right    • DE TREAT TIBIAL SHAFT FX, INTRAMED IMPLANT Left 2017    Procedure: TIBIA INTRAMEDULLARY NAIL/DON INSERTION;  Surgeon: Romero Shanks MD;  Location: [facility];  Service: Orthopedics   • THORACIC DECOMPRESSION POSTERIOR FUSION WITH INSTRUMENTATION N/A 2017    Procedure: T6 costotransversectomy and decompression with T3 to  T9 posterior spinal fusion with instrumentation with Jennifer Gonzalez and Nael Dennis [facility];  Surgeon: Quan Nayak MD;  Location: [facility];  Service:        Social History     Socioeconomic History   • Marital status:      Spouse name: Murray   • Number of children: 3   • Years of education: College   • Highest education level: Not on file   Occupational History     Employer: [employer]     Employer: RETIRED   Tobacco Use   • Smoking status: Never Smoker   • Smokeless tobacco: Never  Used   Substance and Sexual Activity   • Alcohol use: Yes     Comment: social   • Drug use: No   • Sexual activity: Defer       Family History   Problem Relation Age of Onset   • Heart disease Mother    • Hyperlipidemia Mother    • Hypertension Mother    • Diabetes Father    • Charcot-Saloni-Tooth disease Father    • Heart disease Father    • Hypertension Father    • Heart failure Father    • Diabetes Sister    • Heart disease Sister    • Hypertension Sister    • Heart disease Maternal Aunt    • Scoliosis Maternal Aunt    • Heart disease Maternal Uncle    • Diabetes Maternal Grandmother    • Heart disease Maternal Grandmother    • Hypertension Maternal Grandmother    • Uterine cancer Maternal Grandmother    • Heart disease Maternal Grandfather        Allergies   Allergen Reactions   • Hydrocodone Nausea Only   • Morphine And Related Nausea And Vomiting       Current Outpatient Medications   Medication Sig Dispense Refill   • acetaminophen (TYLENOL) 500 MG tablet Take 500 mg by mouth Every 6 (Six) Hours As Needed for Mild Pain .     • calcium carbonate (OS-CARY) 600 MG tablet Take 600 mg by mouth 2 (Two) Times a Day With Meals.     • capecitabine (XELODA) 500 MG chemo tablet Take 3 tabs (1500 mg) by mouth twice a day for 7 days on then 7 days off. 84 tablet 3   • cholecalciferol (VITAMIN D3) 1000 units tablet Take 1,000 Units by mouth Daily.     • diazePAM (VALIUM) 10 MG tablet Take 1 tablet by mouth Every 12 (Twelve) Hours As Needed for Anxiety. 10 tablet 1   • diphenoxylate-atropine (LOMOTIL) 2.5-0.025 MG per tablet Take 1 tablet by mouth 4 (Four) Times a Day As Needed for Diarrhea. 60 tablet 2   • ELIQUIS 5 MG tablet tablet TAKE 1 TABLET BY MOUTH EVERY 12 HOURS 60 tablet 6   • FLONASE ALLERGY RELIEF 50 MCG/ACT nasal spray 2 sprays into each nostril daily.  11   • gabapentin (NEURONTIN) 300 MG capsule Take 1 capsule by mouth Daily. 90 capsule 3   • ketoconazole (NIZORAL) 2 % cream Apply  topically to the appropriate  area as directed Daily. 15 g 0   • losartan (COZAAR) 50 MG tablet Take 1 tablet by mouth Daily. 90 tablet 2   • mesalamine (LIALDA) 1.2 g EC tablet Take 1 tablet by mouth 2 (Two) Times a Day. 180 tablet 0   • metaxalone (SKELAXIN) 800 MG tablet Take 1 tablet by mouth 3 (Three) Times a Day As Needed for Muscle Spasms. 30 tablet 3   • methylPREDNISolone (MEDROL, ZARI,) 4 MG tablet Take as directed on package instructions. 21 each 0   • omeprazole (PriLOSEC) 40 MG capsule TAKE 1 CAPSULE DAILY 90 capsule 2   • ondansetron ODT (ZOFRAN-ODT) 8 MG disintegrating tablet Take 1 tablet by mouth Every 8 (Eight) Hours As Needed for Nausea or Vomiting. 30 tablet 1   • phenazopyridine (PYRIDIUM) 200 MG tablet Take 200 mg by mouth 3 (Three) Times a Day As Needed for bladder spasms.     • prochlorperazine (COMPAZINE) 10 MG tablet Take 1 tablet by mouth Every 6 (Six) Hours As Needed for Nausea or Vomiting. 30 tablet 0   • sennosides-docusate sodium (SENOKOT-S) 8.6-50 MG tablet Take 2 tablets by mouth 2 (Two) Times a Day. 90 tablet 2   • sucralfate (CARAFATE) 1 g tablet Take 1 tablet by mouth 4 (Four) Times a Day. 30 tablet 2   • temazepam (RESTORIL) 15 MG capsule Take 1 capsule by mouth At Night As Needed for Sleep. 30 capsule 2   • traMADol (ULTRAM) 50 MG tablet Take 1 tablet by mouth Every 8 (Eight) Hours As Needed for Moderate Pain . 90 tablet 0   • triamcinolone (KENALOG) 0.1 % cream Apply  topically to the appropriate area as directed 2 (Two) Times a Day. 15 g 0   • trimethoprim (TRIMPEX) 100 MG tablet Take 100 mg by mouth Daily.  2   • Tuberculin PPD (APLISOL ID) Inject  into the skin.     • Unable to find SWISH AND SPIT 5 ML PO Q 2 H PRN  0     No current facility-administered medications for this visit.          LABORATORY:    Lab Results   Component Value Date    TSH 3.770 11/04/2019    PROTIME 13.7 04/16/2018    INR 1.1 04/16/2018     Lab Results   Component Value Date    PROTIME 13.7 04/16/2018    PROTIME 13.0 12/26/2017     PROTIME 14.8 (H) 12/10/2017    INR 1.1 04/16/2018    INR 1.10 12/26/2017    INR 1.20 (H) 12/10/2017    PTT 45.3 (H) 12/10/2017     No results found for: HAV, HCVQUANT, [id number], HCVINFO, HCVGENOTYPE  No results found for: AMPHETSCREEN, BARBITSCNUR, LABBENZSCN, COCAINEUR, LABMETHSCN, MGYXH0C, [id number], HEPBSAB, OXYCODONESCN, 6ACETYLMORP, BUPRENORSCNU, LABOPIASCN, PCPUR, THCURSCR  Last Urine Toxicity     There is no flowsheet data to display.          ASSESSMENT/PLAN:     Patient Update Assessment (new medications, allergies, medical history): Assessed  Medication(s): Xeloda     Currently Taking Medication(s): Yes, takes 1500 mg BID 7 days on, 7 days off as prescribed     Effectiveness of Medication: Effective     Experiencing Side Effects: Tolerating reasonably well     Prior Authorization Status: Approved     Financial Assistance Status: None needed     Any Issues Identified: Not aware of any issues     Appropriate to Process Prescription(s): After review and discussion with patient it is appropriate to process and dispense medication     Counseling Offered: Declined     Next Specialty Pharmacy Visit: 01/14/2020

## 2020-01-03 DIAGNOSIS — G89.3 PAIN, CANCER: Primary | ICD-10-CM

## 2020-01-04 RX ORDER — TRAMADOL HYDROCHLORIDE 50 MG/1
TABLET ORAL
Qty: 90 TABLET | Refills: 1 | Status: SHIPPED | OUTPATIENT
Start: 2020-01-04 | End: 2020-05-15 | Stop reason: SDUPTHER

## 2020-01-06 RX ORDER — MESALAMINE 1.2 G/1
TABLET, DELAYED RELEASE ORAL
Qty: 180 TABLET | Refills: 0 | Status: SHIPPED | OUTPATIENT
Start: 2020-01-06 | End: 2020-04-10

## 2020-01-13 ENCOUNTER — OFFICE VISIT (OUTPATIENT)
Dept: ONCOLOGY | Facility: CLINIC | Age: 60
End: 2020-01-13

## 2020-01-13 ENCOUNTER — LAB (OUTPATIENT)
Dept: OTHER | Facility: HOSPITAL | Age: 60
End: 2020-01-13

## 2020-01-13 ENCOUNTER — INFUSION (OUTPATIENT)
Dept: ONCOLOGY | Facility: HOSPITAL | Age: 60
End: 2020-01-13

## 2020-01-13 VITALS
OXYGEN SATURATION: 97 % | RESPIRATION RATE: 16 BRPM | HEIGHT: 61 IN | DIASTOLIC BLOOD PRESSURE: 83 MMHG | SYSTOLIC BLOOD PRESSURE: 116 MMHG | TEMPERATURE: 97.5 F | WEIGHT: 197.9 LBS | BODY MASS INDEX: 37.37 KG/M2 | HEART RATE: 82 BPM

## 2020-01-13 DIAGNOSIS — Z17.1 MALIGNANT NEOPLASM OF OVERLAPPING SITES OF RIGHT BREAST IN FEMALE, ESTROGEN RECEPTOR NEGATIVE (HCC): ICD-10-CM

## 2020-01-13 DIAGNOSIS — K21.9 GASTROESOPHAGEAL REFLUX DISEASE, ESOPHAGITIS PRESENCE NOT SPECIFIED: Primary | ICD-10-CM

## 2020-01-13 DIAGNOSIS — Z17.1 MALIGNANT NEOPLASM OF OVERLAPPING SITES OF RIGHT BREAST IN FEMALE, ESTROGEN RECEPTOR NEGATIVE (HCC): Primary | ICD-10-CM

## 2020-01-13 DIAGNOSIS — C50.811 MALIGNANT NEOPLASM OF OVERLAPPING SITES OF RIGHT BREAST IN FEMALE, ESTROGEN RECEPTOR NEGATIVE (HCC): ICD-10-CM

## 2020-01-13 DIAGNOSIS — K52.1 CHEMOTHERAPY INDUCED DIARRHEA: ICD-10-CM

## 2020-01-13 DIAGNOSIS — T45.1X5A CHEMOTHERAPY INDUCED DIARRHEA: ICD-10-CM

## 2020-01-13 DIAGNOSIS — C79.51 BONE METASTASES: ICD-10-CM

## 2020-01-13 DIAGNOSIS — C50.811 MALIGNANT NEOPLASM OF OVERLAPPING SITES OF RIGHT BREAST IN FEMALE, ESTROGEN RECEPTOR NEGATIVE (HCC): Primary | ICD-10-CM

## 2020-01-13 LAB
ALBUMIN SERPL-MCNC: 4 G/DL (ref 3.5–5.2)
ALBUMIN/GLOB SERPL: 1.5 G/DL
ALP SERPL-CCNC: 65 U/L (ref 39–117)
ALT SERPL W P-5'-P-CCNC: 18 U/L (ref 1–33)
ANION GAP SERPL CALCULATED.3IONS-SCNC: 9.4 MMOL/L (ref 5–15)
AST SERPL-CCNC: 21 U/L (ref 1–32)
BASOPHILS # BLD AUTO: 0.04 10*3/MM3 (ref 0–0.2)
BASOPHILS NFR BLD AUTO: 1.1 % (ref 0–1.5)
BILIRUB SERPL-MCNC: 0.4 MG/DL (ref 0.1–1.2)
BUN BLD-MCNC: 21 MG/DL (ref 6–20)
BUN/CREAT SERPL: 18.8 (ref 7–25)
CALCIUM SPEC-SCNC: 9.1 MG/DL (ref 8.6–10.5)
CHLORIDE SERPL-SCNC: 102 MMOL/L (ref 98–107)
CO2 SERPL-SCNC: 30.6 MMOL/L (ref 22–29)
CREAT BLD-MCNC: 1.12 MG/DL (ref 0.57–1)
DEPRECATED RDW RBC AUTO: 60.3 FL (ref 37–54)
EOSINOPHIL # BLD AUTO: 0.16 10*3/MM3 (ref 0–0.4)
EOSINOPHIL NFR BLD AUTO: 4.3 % (ref 0.3–6.2)
ERYTHROCYTE [DISTWIDTH] IN BLOOD BY AUTOMATED COUNT: 16.8 % (ref 12.3–15.4)
GFR SERPL CREATININE-BSD FRML MDRD: 50 ML/MIN/1.73
GLOBULIN UR ELPH-MCNC: 2.6 GM/DL
GLUCOSE BLD-MCNC: 129 MG/DL (ref 65–99)
HCT VFR BLD AUTO: 38.8 % (ref 34–46.6)
HGB BLD-MCNC: 12.8 G/DL (ref 12–15.9)
IMM GRANULOCYTES # BLD AUTO: 0.02 10*3/MM3 (ref 0–0.05)
IMM GRANULOCYTES NFR BLD AUTO: 0.5 % (ref 0–0.5)
LYMPHOCYTES # BLD AUTO: 1 10*3/MM3 (ref 0.7–3.1)
LYMPHOCYTES NFR BLD AUTO: 26.8 % (ref 19.6–45.3)
MAGNESIUM SERPL-MCNC: 2.2 MG/DL (ref 1.6–2.6)
MCH RBC QN AUTO: 32.5 PG (ref 26.6–33)
MCHC RBC AUTO-ENTMCNC: 33 G/DL (ref 31.5–35.7)
MCV RBC AUTO: 98.5 FL (ref 79–97)
MONOCYTES # BLD AUTO: 0.44 10*3/MM3 (ref 0.1–0.9)
MONOCYTES NFR BLD AUTO: 11.8 % (ref 5–12)
NEUTROPHILS # BLD AUTO: 2.07 10*3/MM3 (ref 1.7–7)
NEUTROPHILS NFR BLD AUTO: 55.5 % (ref 42.7–76)
NRBC BLD AUTO-RTO: 0 /100 WBC (ref 0–0.2)
PHOSPHATE SERPL-MCNC: 3.8 MG/DL (ref 2.5–4.5)
PLATELET # BLD AUTO: 228 10*3/MM3 (ref 140–450)
PMV BLD AUTO: 10 FL (ref 6–12)
POTASSIUM BLD-SCNC: 4.6 MMOL/L (ref 3.5–5.2)
PROT SERPL-MCNC: 6.6 G/DL (ref 6–8.5)
RBC # BLD AUTO: 3.94 10*6/MM3 (ref 3.77–5.28)
SODIUM BLD-SCNC: 142 MMOL/L (ref 136–145)
WBC NRBC COR # BLD: 3.73 10*3/MM3 (ref 3.4–10.8)

## 2020-01-13 PROCEDURE — 85025 COMPLETE CBC W/AUTO DIFF WBC: CPT | Performed by: INTERNAL MEDICINE

## 2020-01-13 PROCEDURE — 83735 ASSAY OF MAGNESIUM: CPT | Performed by: INTERNAL MEDICINE

## 2020-01-13 PROCEDURE — 36415 COLL VENOUS BLD VENIPUNCTURE: CPT

## 2020-01-13 PROCEDURE — 25010000002 DENOSUMAB 120 MG/1.7ML SOLUTION: Performed by: NURSE PRACTITIONER

## 2020-01-13 PROCEDURE — 99214 OFFICE O/P EST MOD 30 MIN: CPT | Performed by: NURSE PRACTITIONER

## 2020-01-13 PROCEDURE — 84100 ASSAY OF PHOSPHORUS: CPT | Performed by: INTERNAL MEDICINE

## 2020-01-13 PROCEDURE — 96372 THER/PROPH/DIAG INJ SC/IM: CPT | Performed by: NURSE PRACTITIONER

## 2020-01-13 PROCEDURE — 80053 COMPREHEN METABOLIC PANEL: CPT | Performed by: INTERNAL MEDICINE

## 2020-01-13 RX ORDER — DIPHENOXYLATE HYDROCHLORIDE AND ATROPINE SULFATE 2.5; .025 MG/1; MG/1
1 TABLET ORAL 4 TIMES DAILY PRN
Qty: 60 TABLET | Refills: 0 | Status: SHIPPED | OUTPATIENT
Start: 2020-01-13 | End: 2022-06-14

## 2020-01-13 RX ADMIN — DENOSUMAB 120 MG: 120 INJECTION SUBCUTANEOUS at 10:40

## 2020-01-13 NOTE — PROGRESS NOTES
Subjective .     REASONS FOR FOLLOWUP:    1. Stage Ib ([id number]) right breast cancer: Diagnosed May 2010 with excisional biopsy for invasive ductal carcinoma, 1.3 cm, grade 2, ER 90%, TX 80%, HER-2/peter negative (1+ IHC).  Subsequent right mastectomy in July 2010 with no residual breast malignancy, 1/5 sentinel lymph node with micrometastasis (0.25 mm).  Treated in the [facility] system with adjuvant AC ×4 cycles in 2010 (no taxanes administered due to underlying Charcot-Saloni-Tooth with peripheral neuropathy).  Adjuvant Femara (postmenopausal) initiated October 2010 with plan to treat ×10 years.  Genetic testing reportedly negative.  Developed osteopenia treated with Prolia beginning 2/27/13.  2. Recurrent/metastatic disease identified 10/8/17 involving thoracic spine with cord compression at T6, lumbosacral involvement, sternal and right sternoclavicular involvement.  Femara discontinued.  Radiation administered (in the [facility] system) to the thoracic spine beginning 10/19/17 treating T3-T9 to a dose of 24 gray in 6 fractions.  3. Evaluation with MRI 12/8/17 showing persistent T6 cord compression with persistent neurologic compromise requiring surgical treatment 12/11/17 with T6 laminectomy/corpectomy and T3-T9 fusion.  Pathology with metastatic carcinoma of breast origin, ER negative, TX negative, HER-2/peter negative (1+ IHC).  4. Right pulmonary embolism 10/21/17 treated with Lovenox 1 mg/kg (100 mg) every 12 hours.  No evidence of DVT.  Lovenox held due to right gluteus hematoma 3/23/18 through 4/6/18.  Transitioned to oral Eliquis 5mg bid 1/28/19 (as of 2/25/19, patient still using previous supply of Lovenox).  5. Cancer related pain previously on Duragesic 50 µg patch every 72 hours and Dilaudid 4 mg as needed for breakthrough pain.  Narcotics subsequently discontinued with improvement in pain in January 2018.  6. Radiation therapy to L3 dural metastasis and left humerus initiated 1/15/18 (10 fractions),  completed 1/26/18  7. Monthly Xgeva initiated 1/23/18  8. Mild hypercalcemia of malignancy  9. Initiation of palliative oral single agent Xeloda 2/7/18 (2000 mg a.m., 1500 mg p.m. for 14/21 days).  10. 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  In reviewing prior CT scans, has been present for some time.  Monitor for now, if it enlarges consider surgical excision.  11. Cumulative side effects from Xeloda with fatigue, anorexia, diarrhea, increased tearing.  Alteration in dose/schedule with cycle 11 to 1500 mg twice a day for 7 days on followed by 7 days off.    HISTORY OF PRESENT ILLNESS:  The patient is a 59 y.o. year old female who is here for follow-up with the above-mentioned history.    History of Present Illness the patient returns today in 1 month follow-up, continuing on oral Xeloda 1500 mg twice a day for 7 days on, 7 days off.  She is also due for monthly Xgeva today.  She continues with grade 1 hand-foot symptoms which include mild swelling of the hands along with slight dryness and erythema which is manageable with emollient cream.    She has had ongoing issues with cough, mainly at night which has been felt to be related to reflux.  When she was seen last time by Dr. Hancock they discussed starting Carafate along with continuing omeprazole.  She admittedly states she did not start the Carafate.  She was just taken the omeprazole twice a day.  We discussed that the very least trying to take the Carafate twice a day even though it was prescribed 4 times a day.  She is agreeable to try this.    She does note occasional twinges of pain in her right side near where she previously had surgery on her colon some 18 years ago.  She notes upcoming colonoscopy scheduled for 2/7/2020.  Her bowels remain at baseline with occasional diarrhea  requiring Lomotil.  She is requesting a refill today.    We reviewed her blood work which is stable and appropriate for Xgeva therapy today.  She and her  deny other concerns at this time.    Past Medical History:   Diagnosis Date   • Acute pulmonary embolism, cancer-related 10/2017   • Allergic rhinitis    • Arthritis     Right knee; left shoulder   • Cancer (CMS/[facility]) 2010    Right breast   • Cancer (CMS/[facility]) 10/2017    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   • Charcot-Saloni-Tooth disease    • CPAP (continuous positive airway pressure) dependence    • GERD (gastroesophageal reflux disease)    • H/O Colon polyps    • H/O Uterine fibroid    • Heart murmur    • Hyperlipidemia    • Hypertension    • PONV (postoperative nausea and vomiting)      Past Surgical History:   Procedure Laterality Date   • BLADDER SURGERY      Bladder lift   • BREAST RECONSTRUCTION, BREAST TISSUE EXPANDER INSERTION Right    • BREAST SURGERY Right 2010    Mastectomy   •  SECTION  ,    • CHOLECYSTECTOMY     • COLONOSCOPY     • HERNIA REPAIR  2015    [facility]   • HYSTERECTOMY      Partial   • KNEE SURGERY Right    • LEG SURGERY Left     Broken   • MASTECTOMY Right    • DE TREAT TIBIAL SHAFT FX, INTRAMED IMPLANT Left 2017    Procedure: TIBIA INTRAMEDULLARY NAIL/DON INSERTION;  Surgeon: Romero Shanks MD;  Location: [facility];  Service: Orthopedics   • THORACIC DECOMPRESSION POSTERIOR FUSION WITH INSTRUMENTATION N/A 2017    Procedure: T6 costotransversectomy and decompression with T3 to  T9 posterior spinal [facility];  Surgeon: Quan Nayak MD;  Location: [facility];  Service:        ONCOLOGIC HISTORY:  (History from previous dates can be found in the separate document.)    Current Outpatient Medications on File Prior to Visit   Medication Sig Dispense Refill   • acetaminophen (TYLENOL) 500 MG tablet Take 500 mg by mouth  Every 6 (Six) Hours As Needed for Mild Pain .     • calcium carbonate (OS-CARY) 600 MG tablet Take 600 mg by mouth 2 (Two) Times a Day With Meals.     • capecitabine (XELODA) 500 MG chemo tablet Take 3 tabs (1500 mg) by mouth twice a day for 7 days on then 7 days off. 84 tablet 3   • cholecalciferol (VITAMIN D3) 1000 units tablet Take 1,000 Units by mouth Daily.     • diazePAM (VALIUM) 10 MG tablet Take 1 tablet by mouth Every 12 (Twelve) Hours As Needed for Anxiety. 10 tablet 1   • diphenoxylate-atropine (LOMOTIL) 2.5-0.025 MG per tablet Take 1 tablet by mouth 4 (Four) Times a Day As Needed for Diarrhea. 60 tablet 2   • ELIQUIS 5 MG tablet tablet TAKE 1 TABLET BY MOUTH EVERY 12 HOURS 60 tablet 6   • FLONASE ALLERGY RELIEF 50 MCG/ACT nasal spray 2 sprays into each nostril daily.  11   • gabapentin (NEURONTIN) 300 MG capsule Take 1 capsule by mouth Daily. 90 capsule 3   • losartan (COZAAR) 50 MG tablet Take 1 tablet by mouth Daily. 90 tablet 2   • mesalamine (LIALDA) 1.2 g EC tablet TAKE 1 TABLET BY MOUTH TWICE DAILY 180 tablet 0   • metaxalone (SKELAXIN) 800 MG tablet Take 1 tablet by mouth 3 (Three) Times a Day As Needed for Muscle Spasms. 30 tablet 3   • omeprazole (PriLOSEC) 40 MG capsule TAKE 1 CAPSULE DAILY 90 capsule 2   • ondansetron ODT (ZOFRAN-ODT) 8 MG disintegrating tablet Take 1 tablet by mouth Every 8 (Eight) Hours As Needed for Nausea or Vomiting. 30 tablet 1   • phenazopyridine (PYRIDIUM) 200 MG tablet Take 200 mg by mouth 3 (Three) Times a Day As Needed for bladder spasms.     • prochlorperazine (COMPAZINE) 10 MG tablet Take 1 tablet by mouth Every 6 (Six) Hours As Needed for Nausea or Vomiting. 30 tablet 0   • sennosides-docusate sodium (SENOKOT-S) 8.6-50 MG tablet Take 2 tablets by mouth 2 (Two) Times a Day. 90 tablet 2   • sucralfate (CARAFATE) 1 g tablet Take 1 tablet by mouth 4 (Four) Times a Day. 30 tablet 2   • temazepam (RESTORIL) 15 MG capsule Take 1 capsule by mouth At Night As Needed for  Sleep. 30 capsule 2   • traMADol (ULTRAM) 50 MG tablet TAKE 1 TABLET BY MOUTH EVERY 8 HOURS AS NEEDED FOR MODERATE PAIN 90 tablet 1   • ketoconazole (NIZORAL) 2 % cream Apply  topically to the appropriate area as directed Daily. 15 g 0   • triamcinolone (KENALOG) 0.1 % cream Apply  topically to the appropriate area as directed 2 (Two) Times a Day. 15 g 0   • [DISCONTINUED] methylPREDNISolone (MEDROL, ZARI,) 4 MG tablet Take as directed on package instructions. 21 each 0   • [DISCONTINUED] trimethoprim (TRIMPEX) 100 MG tablet Take 100 mg by mouth Daily.  2   • [DISCONTINUED] Tuberculin PPD (APLISOL ID) Inject  into the skin.     • [DISCONTINUED] Unable to find SWISH AND SPIT 5 ML PO Q 2 H PRN  0     No current facility-administered medications on file prior to visit.        ALLERGIES:     Allergies   Allergen Reactions   • Hydrocodone Nausea Only   • Morphine And Related Nausea And Vomiting     Social History     Socioeconomic History   • Marital status:      Spouse name: Murray   • Number of children: 3   • Years of education: College   • Highest education level: Not on file   Occupational History     Employer: [employer]     Employer: RETIRED   Tobacco Use   • Smoking status: Never Smoker   • Smokeless tobacco: Never Used   Substance and Sexual Activity   • Alcohol use: Yes     Comment: social   • Drug use: No   • Sexual activity: Defer     Family History   Problem Relation Age of Onset   • Heart disease Mother    • Hyperlipidemia Mother    • Hypertension Mother    • Diabetes Father    • Charcot-Saloni-Tooth disease Father    • Heart disease Father    • Hypertension Father    • Heart failure Father    • Diabetes Sister    • Heart disease Sister    • Hypertension Sister    • Heart disease Maternal Aunt    • Scoliosis Maternal Aunt    • Heart disease Maternal Uncle    • Diabetes Maternal Grandmother    • Heart disease Maternal Grandmother    • Hypertension Maternal Grandmother    • Uterine cancer Maternal  "Grandmother    • Heart disease Maternal Grandfather      Review of Systems   Constitutional: Positive for fatigue. Negative for activity change, appetite change, fever and unexpected weight change.   HENT: Negative for congestion, mouth sores, nosebleeds, sore throat and voice change.    Eyes: Negative for discharge.   Respiratory: Negative for cough, shortness of breath and wheezing.    Cardiovascular: Negative for chest pain, palpitations and leg swelling.   Gastrointestinal: Positive for abdominal pain (occasional, right upper side/quadrant) and diarrhea (controlled with occasional imodium or lomotil). Negative for abdominal distention, blood in stool, constipation, nausea and vomiting.   Endocrine: Negative for cold intolerance and heat intolerance.   Genitourinary: Negative for difficulty urinating, dysuria, flank pain, frequency, hematuria and urgency.   Musculoskeletal: Positive for arthralgias. Negative for back pain, joint swelling and myalgias.   Skin: Negative for wound.   Neurological: Negative for dizziness, syncope, weakness, light-headedness, numbness and headaches.   Hematological: Negative for adenopathy. Does not bruise/bleed easily.   Psychiatric/Behavioral: Negative for confusion and sleep disturbance. The patient is not nervous/anxious.          Objective    Vitals:    01/13/20 0948   BP: 116/83   Pulse: 82   Resp: 16   Temp: 97.5 °F (36.4 °C)   TempSrc: Oral   SpO2: 97%   Weight: 89.8 kg (197 lb 14.4 oz)   Height: 154.9 cm (60.98\")   PainSc:   6   PainLoc: Abdomen  Comment: right side        Current Status 1/13/2020   ECOG score 0     Physical Exam   Constitutional: She is oriented to person, place, and time. She appears well-developed and well-nourished.   HENT:   Mouth/Throat: Oropharynx is clear and moist.   Eyes: Conjunctivae are normal.   Neck: No thyromegaly present.   Cardiovascular: Normal rate and regular rhythm. Exam reveals no gallop and no friction rub.   No murmur " heard.  Pulmonary/Chest: Breath sounds normal. No respiratory distress.   Abdominal: Soft. Bowel sounds are normal. She exhibits no distension. There is no tenderness.   Musculoskeletal: She exhibits no edema.   Lower extremity braces in place.  No significant lower extremity edema   Lymphadenopathy:        Head (right side): No submandibular adenopathy present.     She has no cervical adenopathy.     She has no axillary adenopathy.        Right: No inguinal and no supraclavicular adenopathy present.        Left: No inguinal and no supraclavicular adenopathy present.   Neurological: She is alert and oriented to person, place, and time. She displays normal reflexes. No cranial nerve deficit. She exhibits normal muscle tone.   Bilateral lower extremity strength, 4+/5   Skin: Skin is warm and dry. No rash noted. There is erythema (hands/feet, grade I).   Mild erythema involving the palms of the hands.  Mild degree of skin cracking with no peeling.    Psychiatric: She has a normal mood and affect. Her behavior is normal.     RECENT LABS:  Results from last 7 days   Lab Units 01/13/20  0855   WBC 10*3/mm3 3.73   NEUTROS ABS 10*3/mm3 2.07   HEMOGLOBIN g/dL 12.8   HEMATOCRIT % 38.8   PLATELETS 10*3/mm3 228     Results from last 7 days   Lab Units 01/13/20  0855   SODIUM mmol/L 142   POTASSIUM mmol/L 4.6   CHLORIDE mmol/L 102   CO2 mmol/L 30.6*   BUN mg/dL 21*   CREATININE mg/dL 1.12*   CALCIUM mg/dL 9.1   ALBUMIN g/dL 4.00   BILIRUBIN mg/dL 0.4   ALK PHOS U/L 65   ALT (SGPT) U/L 18   AST (SGOT) U/L 21   GLUCOSE mg/dL 129*   MAGNESIUM mg/dL 2.2           Assessment/Plan      1. Previous Stage Ib ([id number]) right breast cancer:  · Diagnosed May 2010 with excisional biopsy for invasive ductal carcinoma, 1.3 cm, grade 2, ER 90%, CT 80%, HER-2/petre negative (1+ IHC).    · Subsequent right mastectomy in July 2010 with no residual breast malignancy, 1/5 sentinel lymph node with micrometastasis (0.25 mm).    · Treated in the  [facility] system with adjuvant AC ×4 cycles in 2010 (no taxanes administered due to underlying Charcot-Saloni-Tooth with peripheral neuropathy).    · Adjuvant Femara (postmenopausal) initiated October 2010 with plan to treat ×10 years.    · Genetic testing reportedly negative.    · Developed osteopenia treated with Prolia beginning 2/27/13. Subsequently discontinued due to identification of metastatic disease.  2. Recurrent/metastatic disease identified 10/8/17:  · Disease involving thoracic spine with cord compression at T6, lumbosacral involvement, sternal and right sternoclavicular involvement.    · Femara discontinued in 10/2017.    · Radiation administered (in the [facility] system) to the thoracic spine beginning 10/19/17 treating T3-T9 to a dose of 24 gray in 6 fractions.  · Evaluation with MRI 12/8/17 showing persistent T6 cord compression with persistent neurologic compromise requiring surgical treatment 12/11/17 with T6 laminectomy/corpectomy and T3-T9 fusion.  Pathology with metastatic carcinoma of breast origin, ER negative, MI negative, HER-2/peter negative (1+ IHC).  · Additional staging evaluation 12/8/17 with no evidence of visceral metastatic disease, bone scan showing involvement of thoracic spine, sternum, left humerus, mid frontal bone.  No plane film correlate of left humerus lesion.  MRI lumbar spine with small intradural L3 metastasis.  CA 15-3 12/6/17- 17.  · Palliative radiation therapy to L3 dural metastasis and left humerus initiated 1/15/18 (10 fractions), completed 1/26/18.  · Hypercalcemia of malignancy with calcium in the 10-11 range.  · Initiation of monthly Xgeva 1/23/18.  · Baseline CT scan 1/30/18 with no evidence of visceral involvement.  Cluster of nodular opacities in the right lower lobe suspected to be infectious or related to bronchiolitis. Bone scan 1/30/18 showed postsurgical change in the thoracic spine, stable uptake in the frontal bone, no new areas of disease.  · Initiation of  palliative oral single agent Xeloda 2/7/18 2000 mg a.m., 1500 mg p.m. for 14/21 days.   · Following 3 cycles xeloda, bone scan 4/4/18 showed no change from the prior study.  CT scan 4/4/18 showed a small pericardial effusion of unclear significance as well as a subcutaneous nodule in the right lateral chest wall.  Subsequent evaluation with echocardiogram 4/17/18 showed no evidence of pericardial effusion.  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  We discussed the possibility of surgical excision of the right subcutaneous chest wall lesion for more definitive diagnosis.  Reviewed previous CT images dating back to 12/8/17 and the lesion was present even at that time measuring around 1.7 cm although not commented on in the radiology report.  As this appears to be an indolent low-grade process unrelated to her breast cancer, recommendee foregoing surgical excision at this time and monitoring this area on future scans.  The patient agreed.    · Following 6 cycles of Xeloda, CT 6/6/18  showed stable findings, no evidence of progressive disease.  There was a comment regarding subcutaneous abnormality in the anterior abdominal wall and this was related to Lovenox injection sites.  Bone scan 6/6/18 showed no interval change.   · CT scan and bone scan 8/13/18 following 9 cycles of Xeloda showed stable findings with no evidence of significant visceral metastases.  Her bone lesions appear stable on bone scan.  The spindle cell neoplasm in her right chest wall actually decreased in size from 2 cm down to 1.6 cm.    · The patient experienced some symptoms of diarrhea, anorexia, generalized weakness during cycle 9 Xeloda it was unclear whether this was related to a viral gastroenteritis or toxicity from treatment.  Symptoms recurred during cycle 10 and treatment was cut short by 2  days.  Symptoms attributed to Xeloda.  With cycle 11, dose and schedule altered to 1500 mg twice daily for 7 days on followed by 7 days off .  · Repeat scans 12/9/2019: bone scan and CT with no significant changes..    · The patient returns today for monthly follow-up, continuing on Xeloda 1500 mg twice a day for 7 days on, 7 days off.  She is tolerating treatment well with grade 1 hand-foot syndrome and mild diarrhea controlled with occasional Imodium or Lomotil.  She is also due for monthly Xgeva today.  She is otherwise scheduled to return in 4 weeks for follow-up with Dr. Hancock along with ongoing Xgeva.  We plan to repeat CT scans and bone scan at 3-month interval and these have already been scheduled.    3. Right pulmonary embolism:  · Diagnosed on CT angiogram 10/21/17 involving small right lower lobe pulmonary artery.  Lower extremity Dopplers negative.  · Bilateral lower extremity Dopplers negative again 12/5/17.  · Received chronic Lovenox 1 mg/kg twice per day, transition to oral Eliquis in February 2019, continuing on Eliquis 5 mg twice daily.  4. Cancer related pain:  · Previously receiving Duragesic 50 µg patch every 72 hours along with Dilaudid 4 mg as needed for breakthrough pain  · The patient's pain improved over time and she was able to discontinue both Duragesic and Dilaudid in the interval.  · Patient does take occasional Flexeril at bedtime due to back spasm/pain when she has been more active.  · The patient does have some occasional aggravation of her chronic back pain with significant rehabilitation activity and requires an occasional dose of Dilaudid.  No narcotic requirements recently.  5. Chemotherapy-induced diarrhea with subsequent C. difficile colitis:  · The patient developed initial diarrhea related to Xeloda at regular dosing.  · Symptoms improved on reduced dose Xeloda  · Flare of symptoms in October 2018 with apparent finding of C. difficile colitis by GI, treated with course of  oral vancomycin with improvement in symptoms.  · Patient notes minimal intermittent diarrhea on Xeloda requiring occasional dosing of Imodium or Lomotil.  Requesting a refill of Lomotil today.  6. Traumatic left tibia/fibular fracture:  · Status post ORIF 12/6/17  · Specimen was sent for pathologic review, negative for evidence of malignancy  7. Hypercalcemia:  · Suspect hypercalcemia of malignancy, calcium in  10-11 range previously.  · Calcium normalized following initiation of monthly Xgeva on 1/23/18.  8. Chemotherapy-induced mucositis:  · Patient had a minimal degree of mucositis with cycle 2.  The patient has magic mouthwash to use as needed.  No subsequent mucositis.  9. Recurrent UTI, bladder wall thickening on CT:  · Patient had an enterococcal UTI on 3/2/18 sensitive to nitrofurantoin and received treatment, unclear how long.  · Recurrent UTI 3/20/18 with urine culture growing Klebsiella, initially treated with nitrofurantoin, transitioned to Levaquin.  · CT 4/4/18 with diffuse bladder wall thickening with increased nodular thickening at the left base.  Referral to urogynecology Dr. May Johnson.  She was placed on a prophylactic dose of trimethoprim 100 mg daily, bladder wall thickening felt to be related to recent recurrent urinary tract infections.  · Patient with urinary symptoms, treated with course of Macrobid at the end of December 2018, urine culture however was negative 12/31/18.  · Patient was found to have Klebsiella UTI 7/29/2019 which was successfully treated with Macrobid with complete resolution of symptoms.  10.   Mobility:  · The patient underwent an intensive course of rehabilitation at [facility].  She graduated from her outpatient course November 2018.  · Overall the patient has improved dramatically in terms of mobility, now walking around 1 mile per day without assistance. She continues to wear bilateral ankle braces.  11.  Depression:  · The patient weaned herself off of Cymbalta and  reports that her symptoms remain stable.  12. 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  Symptoms did improve and she continued on the same dose.    · Symptoms are very manageable and mild currently with only slight erythema and some skin cracking in the hands.  13. Elevated liver function studies:  · Liver function studies increased 8/20/19 with ALT 98, AST 70, normal total bilirubin.  · Negative viral hepatitis A, B, and C panel 8/23/18  · Likely related to hepatic steatosis seen on CT, no definitive evidence of metastatic liver lesions.   · Subsequent improvement in LFTs  · Today, LFTs are normal  14. Chemotherapy induced leukopenia:  · WBC today 3.7 with normal differential  15. GERD:  · The patient noted at the last visit an increase in reflux symptoms despite omeprazole 20 mg daily and also noted increased cough possibly related.  She was advised to increase her omeprazole dose to 40 mg daily.  · Added Carafate 1 g 4 times daily 9/23/2019.  · Patient reviewed today, 1/13/2020, admittedly not taking Carafate and just taking omeprazole twice a day.  She notes that she is coughing more at night and in the day.  Recent CT scans were stable as noted above.  This is still felt to be related to reflux.  With this in mind I encouraged her to add the Carafate as previously prescribed maybe even just twice a day as she feels she would have trouble with 4 times a day compliance which is understandable.  We will continue to monitor.  · Of note, patient having intermittent twinges of right sided/upper quadrant abdominal pain, noting that it feels near where she had previous colon surgery.  Her bowels remain at baseline as documented above.  She is scheduled to undergo repeat colonoscopy 2/7/2020.  Encouraged her to let us know should the pain  change in intensity or frequency.  16. Insomnia:  · Patient with prior paradoxical reaction to Benadryl  · Improved on temazepam 15 mg nightly as needed.   17. Health maintenance:  · Patient notes that she has a history of colon polyps and was due for a follow-up colonoscopy on 9/12/2019.  We did discuss there is no necessity to pursue colonoscopy in the setting of her metastatic breast cancer.  The patient deferred the colonoscopy but does wish to proceed as scheduled in January 2020.  18. Right shoulder pain:  · Patient was evaluated by Dr Forrester.  She has experienced difficulty over the past year and a half with her right shoulder although she has not complained of this in prior visits to our office.  She reports difficulty with abduction.    · The patient did undergo MRI 11/2019 of her right shoulder at an outside facility.this report showed mild supraspinatus tendinosis without tear.  Low-grade distal supraspinatus tendinosis.  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  Mild flattening of the glenoid articular surface.  Circumferential labral tear. She has discussed possible stem cell injection in January.  By most recent scan there is no evidence of involvement from her breast cancer in this area.    Plan:  1. Continue oral Xeloda 1500 mg twice a day 7 days on followed by 7 days off.  2. Monthly Xgeva today  3. Lomotil refilled today as requested.  4. Continue Eliquis 5 mg twice daily.  5. Continue use of emollient cream on the hands and feet and continue to wear socks and gloves at night  6. Continue omeprazole 40 mg daily.  7. Add Carafate 1 g at least twice a day as discussed above with potential to use 4 times a day.  If she goes up to this dosing she will need a refill with more tablets as only #30 were initially sent in.  8. In 4 weeks MD visit with CBC, CMP, magnesium, phosphorus.  Patient will be due for monthly Xgeva.  9. In 7 weeks CT chest abdomen pelvis and bone  scan  10. In 8 weeks MD visit with CBC, CMP, magnesium, phosphorus.  Patient will be due for monthly Xgeva.    Patient continuing on high risk medication requiring intensive monitoring.

## 2020-01-14 ENCOUNTER — SPECIALTY PHARMACY (OUTPATIENT)
Dept: PHARMACY | Facility: HOSPITAL | Age: 60
End: 2020-01-14

## 2020-01-31 ENCOUNTER — APPOINTMENT (OUTPATIENT)
Dept: ONCOLOGY | Facility: HOSPITAL | Age: 60
End: 2020-01-31

## 2020-01-31 ENCOUNTER — TELEPHONE (OUTPATIENT)
Dept: ONCOLOGY | Facility: CLINIC | Age: 60
End: 2020-01-31

## 2020-01-31 ENCOUNTER — LAB (OUTPATIENT)
Dept: OTHER | Facility: HOSPITAL | Age: 60
End: 2020-01-31

## 2020-01-31 DIAGNOSIS — R30.0 DYSURIA: Primary | ICD-10-CM

## 2020-01-31 DIAGNOSIS — R30.0 DYSURIA: ICD-10-CM

## 2020-01-31 LAB
BACTERIA UR QL AUTO: ABNORMAL /HPF
BILIRUB UR QL STRIP: NEGATIVE
CLARITY UR: CLEAR
COLOR UR: YELLOW
GLUCOSE UR STRIP-MCNC: NEGATIVE MG/DL
HGB UR QL STRIP.AUTO: ABNORMAL
HYALINE CASTS UR QL AUTO: ABNORMAL /LPF
KETONES UR QL STRIP: NEGATIVE
LEUKOCYTE ESTERASE UR QL STRIP.AUTO: NEGATIVE
NITRITE UR QL STRIP: NEGATIVE
PH UR STRIP.AUTO: 7.5 [PH] (ref 5–8)
PROT UR QL STRIP: ABNORMAL
RBC # UR: ABNORMAL /HPF
REF LAB TEST METHOD: ABNORMAL
SP GR UR STRIP: 1.01 (ref 1–1.03)
SQUAMOUS #/AREA URNS HPF: ABNORMAL /HPF
UROBILINOGEN UR QL STRIP: ABNORMAL
WBC UR QL AUTO: ABNORMAL /HPF

## 2020-01-31 PROCEDURE — 81001 URINALYSIS AUTO W/SCOPE: CPT | Performed by: INTERNAL MEDICINE

## 2020-01-31 PROCEDURE — 87086 URINE CULTURE/COLONY COUNT: CPT | Performed by: INTERNAL MEDICINE

## 2020-01-31 NOTE — TELEPHONE ENCOUNTER
Pt wants to know if Dr Hancock can call in something for a UTI ? Pt is having back pain and cloudy urine x's 4 days   [facility] [phone]    Pt contact # [phone]

## 2020-01-31 NOTE — TELEPHONE ENCOUNTER
Pt called stating that she has been having increased burning, frequency and back pain for a few days now. She thinks she has a UTI. D/W Dr. Hancock. Per Dr. Hancock, pt needs to come in for a UA. She would prefer EP. Orders placed and message sent to scheduling.

## 2020-02-02 LAB — BACTERIA SPEC AEROBE CULT: NORMAL

## 2020-02-03 ENCOUNTER — TELEPHONE (OUTPATIENT)
Dept: ONCOLOGY | Facility: HOSPITAL | Age: 60
End: 2020-02-03

## 2020-02-03 ENCOUNTER — TELEPHONE (OUTPATIENT)
Dept: ONCOLOGY | Facility: CLINIC | Age: 60
End: 2020-02-03

## 2020-02-03 NOTE — TELEPHONE ENCOUNTER
Called and notified pt. V/u    ----- Message from Ubaldo Hancock Jr., MD sent at 2/3/2020 10:11 AM EST -----  No- culture neg (mixed). Hold eliquis day before and resume day after  ----- Message -----  From: Jessica Aguilar RN  Sent: 2/3/2020   9:54 AM EST  To: Ubaldo Hancock Jr., MD    Can you look over patients urine culture and let me know if anything needs to be sent in? Also she is having a colonoscopy Friday and is on eliquis. How long prior to procedure does she need to hold this?

## 2020-02-03 NOTE — TELEPHONE ENCOUNTER
Pt asking about urine culture results and also is having colonoscopy Friday and is on Eliquis, asking how many days prior does she hold it. msg sent to  to review these things and let us know.

## 2020-02-04 RX ORDER — TEMAZEPAM 15 MG/1
15 CAPSULE ORAL NIGHTLY PRN
Qty: 30 CAPSULE | Refills: 1 | Status: SHIPPED | OUTPATIENT
Start: 2020-02-04 | End: 2020-04-06 | Stop reason: SDUPTHER

## 2020-02-04 RX ORDER — GABAPENTIN 300 MG/1
300 CAPSULE ORAL DAILY
Qty: 90 CAPSULE | Refills: 1 | Status: SHIPPED | OUTPATIENT
Start: 2020-02-04 | End: 2020-03-13 | Stop reason: SDUPTHER

## 2020-02-06 VITALS
SYSTOLIC BLOOD PRESSURE: 142 MMHG | RESPIRATION RATE: 17 BRPM | SYSTOLIC BLOOD PRESSURE: 117 MMHG | RESPIRATION RATE: 14 BRPM | DIASTOLIC BLOOD PRESSURE: 31 MMHG | HEART RATE: 81 BPM | SYSTOLIC BLOOD PRESSURE: 104 MMHG | TEMPERATURE: 96.6 F | TEMPERATURE: 97.3 F | DIASTOLIC BLOOD PRESSURE: 40 MMHG | RESPIRATION RATE: 8 BRPM | SYSTOLIC BLOOD PRESSURE: 94 MMHG | HEART RATE: 85 BPM | HEIGHT: 61 IN | DIASTOLIC BLOOD PRESSURE: 53 MMHG | HEART RATE: 82 BPM | DIASTOLIC BLOOD PRESSURE: 92 MMHG | DIASTOLIC BLOOD PRESSURE: 82 MMHG | SYSTOLIC BLOOD PRESSURE: 171 MMHG | HEART RATE: 91 BPM | SYSTOLIC BLOOD PRESSURE: 88 MMHG | HEART RATE: 84 BPM | DIASTOLIC BLOOD PRESSURE: 60 MMHG | DIASTOLIC BLOOD PRESSURE: 46 MMHG | SYSTOLIC BLOOD PRESSURE: 71 MMHG | RESPIRATION RATE: 20 BRPM | HEART RATE: 93 BPM | HEART RATE: 78 BPM | SYSTOLIC BLOOD PRESSURE: 113 MMHG | OXYGEN SATURATION: 100 % | HEART RATE: 80 BPM | SYSTOLIC BLOOD PRESSURE: 67 MMHG | WEIGHT: 196 LBS | OXYGEN SATURATION: 96 % | OXYGEN SATURATION: 98 % | DIASTOLIC BLOOD PRESSURE: 74 MMHG | DIASTOLIC BLOOD PRESSURE: 50 MMHG | OXYGEN SATURATION: 93 % | DIASTOLIC BLOOD PRESSURE: 61 MMHG | HEART RATE: 70 BPM | SYSTOLIC BLOOD PRESSURE: 119 MMHG | HEART RATE: 79 BPM | SYSTOLIC BLOOD PRESSURE: 122 MMHG | SYSTOLIC BLOOD PRESSURE: 164 MMHG | OXYGEN SATURATION: 99 % | DIASTOLIC BLOOD PRESSURE: 55 MMHG | DIASTOLIC BLOOD PRESSURE: 68 MMHG | HEART RATE: 88 BPM | OXYGEN SATURATION: 94 % | DIASTOLIC BLOOD PRESSURE: 88 MMHG

## 2020-02-06 PROBLEM — Z83.71 FAMILY HISTORY OF COLON POLYPS: Status: ACTIVE | Noted: 2020-02-07

## 2020-02-07 ENCOUNTER — AMBULATORY SURGICAL CENTER (AMBULATORY)
Dept: URBAN - METROPOLITAN AREA SURGERY 17 | Facility: SURGERY | Age: 60
End: 2020-02-07
Payer: COMMERCIAL

## 2020-02-07 ENCOUNTER — OFFICE (AMBULATORY)
Dept: URBAN - METROPOLITAN AREA PATHOLOGY 4 | Facility: PATHOLOGY | Age: 60
End: 2020-02-07
Payer: COMMERCIAL

## 2020-02-07 DIAGNOSIS — Z83.71 FAMILY HISTORY OF COLONIC POLYPS: ICD-10-CM

## 2020-02-07 DIAGNOSIS — K57.30 DIVERTICULOSIS OF LARGE INTESTINE WITHOUT PERFORATION OR ABS: ICD-10-CM

## 2020-02-07 DIAGNOSIS — K52.9 NONINFECTIVE GASTROENTERITIS AND COLITIS, UNSPECIFIED: ICD-10-CM

## 2020-02-07 DIAGNOSIS — Z86.010 PERSONAL HISTORY OF COLONIC POLYPS: ICD-10-CM

## 2020-02-07 LAB
GI HISTOLOGY: A. SELECT: (no result)
GI HISTOLOGY: B. SELECT: (no result)
GI HISTOLOGY: C. SELECT: (no result)
GI HISTOLOGY: D. SELECT: (no result)
GI HISTOLOGY: E. SELECT: (no result)
GI HISTOLOGY: F. SELECT: (no result)
GI HISTOLOGY: G. SELECT: (no result)
GI HISTOLOGY: PDF REPORT: (no result)

## 2020-02-07 PROCEDURE — 45380 COLONOSCOPY AND BIOPSY: CPT | Mod: PT | Performed by: INTERNAL MEDICINE

## 2020-02-07 PROCEDURE — 88305 TISSUE EXAM BY PATHOLOGIST: CPT | Performed by: INTERNAL MEDICINE

## 2020-02-10 ENCOUNTER — OFFICE VISIT (OUTPATIENT)
Dept: ONCOLOGY | Facility: CLINIC | Age: 60
End: 2020-02-10

## 2020-02-10 ENCOUNTER — LAB (OUTPATIENT)
Dept: OTHER | Facility: HOSPITAL | Age: 60
End: 2020-02-10

## 2020-02-10 ENCOUNTER — INFUSION (OUTPATIENT)
Dept: ONCOLOGY | Facility: HOSPITAL | Age: 60
End: 2020-02-10

## 2020-02-10 VITALS
RESPIRATION RATE: 16 BRPM | DIASTOLIC BLOOD PRESSURE: 73 MMHG | HEIGHT: 61 IN | SYSTOLIC BLOOD PRESSURE: 129 MMHG | HEART RATE: 101 BPM | TEMPERATURE: 97.9 F | WEIGHT: 196.5 LBS | OXYGEN SATURATION: 97 % | BODY MASS INDEX: 37.1 KG/M2

## 2020-02-10 DIAGNOSIS — Z17.1 MALIGNANT NEOPLASM OF OVERLAPPING SITES OF RIGHT BREAST IN FEMALE, ESTROGEN RECEPTOR NEGATIVE (HCC): Primary | ICD-10-CM

## 2020-02-10 DIAGNOSIS — C50.811 MALIGNANT NEOPLASM OF OVERLAPPING SITES OF RIGHT BREAST IN FEMALE, ESTROGEN RECEPTOR NEGATIVE (HCC): Primary | ICD-10-CM

## 2020-02-10 DIAGNOSIS — Z17.1 MALIGNANT NEOPLASM OF OVERLAPPING SITES OF RIGHT BREAST IN FEMALE, ESTROGEN RECEPTOR NEGATIVE (HCC): ICD-10-CM

## 2020-02-10 DIAGNOSIS — C50.811 MALIGNANT NEOPLASM OF OVERLAPPING SITES OF RIGHT BREAST IN FEMALE, ESTROGEN RECEPTOR NEGATIVE (HCC): ICD-10-CM

## 2020-02-10 LAB
ALBUMIN SERPL-MCNC: 4.3 G/DL (ref 3.5–5.2)
ALBUMIN/GLOB SERPL: 1.7 G/DL
ALP SERPL-CCNC: 58 U/L (ref 39–117)
ALT SERPL W P-5'-P-CCNC: 18 U/L (ref 1–33)
ANION GAP SERPL CALCULATED.3IONS-SCNC: 13.5 MMOL/L (ref 5–15)
AST SERPL-CCNC: 17 U/L (ref 1–32)
BASOPHILS # BLD AUTO: 0.04 10*3/MM3 (ref 0–0.2)
BASOPHILS NFR BLD AUTO: 0.6 % (ref 0–1.5)
BILIRUB SERPL-MCNC: 0.3 MG/DL (ref 0.1–1.2)
BUN BLD-MCNC: 16 MG/DL (ref 6–20)
BUN/CREAT SERPL: 17 (ref 7–25)
CALCIUM SPEC-SCNC: 9.9 MG/DL (ref 8.6–10.5)
CHLORIDE SERPL-SCNC: 102 MMOL/L (ref 98–107)
CO2 SERPL-SCNC: 27.5 MMOL/L (ref 22–29)
CREAT BLD-MCNC: 0.94 MG/DL (ref 0.57–1)
DEPRECATED RDW RBC AUTO: 59.5 FL (ref 37–54)
EOSINOPHIL # BLD AUTO: 0.18 10*3/MM3 (ref 0–0.4)
EOSINOPHIL NFR BLD AUTO: 2.5 % (ref 0.3–6.2)
ERYTHROCYTE [DISTWIDTH] IN BLOOD BY AUTOMATED COUNT: 17 % (ref 12.3–15.4)
GFR SERPL CREATININE-BSD FRML MDRD: 61 ML/MIN/1.73
GLOBULIN UR ELPH-MCNC: 2.5 GM/DL
GLUCOSE BLD-MCNC: 161 MG/DL (ref 65–99)
HCT VFR BLD AUTO: 38.1 % (ref 34–46.6)
HGB BLD-MCNC: 12.5 G/DL (ref 12–15.9)
IMM GRANULOCYTES # BLD AUTO: 0.03 10*3/MM3 (ref 0–0.05)
IMM GRANULOCYTES NFR BLD AUTO: 0.4 % (ref 0–0.5)
LYMPHOCYTES # BLD AUTO: 1.25 10*3/MM3 (ref 0.7–3.1)
LYMPHOCYTES NFR BLD AUTO: 17.4 % (ref 19.6–45.3)
MAGNESIUM SERPL-MCNC: 1.9 MG/DL (ref 1.6–2.6)
MCH RBC QN AUTO: 32.1 PG (ref 26.6–33)
MCHC RBC AUTO-ENTMCNC: 32.8 G/DL (ref 31.5–35.7)
MCV RBC AUTO: 97.9 FL (ref 79–97)
MONOCYTES # BLD AUTO: 0.59 10*3/MM3 (ref 0.1–0.9)
MONOCYTES NFR BLD AUTO: 8.2 % (ref 5–12)
NEUTROPHILS # BLD AUTO: 5.09 10*3/MM3 (ref 1.7–7)
NEUTROPHILS NFR BLD AUTO: 70.9 % (ref 42.7–76)
NRBC BLD AUTO-RTO: 0 /100 WBC (ref 0–0.2)
PHOSPHATE SERPL-MCNC: 3.6 MG/DL (ref 2.5–4.5)
PLATELET # BLD AUTO: 251 10*3/MM3 (ref 140–450)
PMV BLD AUTO: 10.2 FL (ref 6–12)
POTASSIUM BLD-SCNC: 4.4 MMOL/L (ref 3.5–5.2)
PROT SERPL-MCNC: 6.8 G/DL (ref 6–8.5)
RBC # BLD AUTO: 3.89 10*6/MM3 (ref 3.77–5.28)
SODIUM BLD-SCNC: 143 MMOL/L (ref 136–145)
WBC NRBC COR # BLD: 7.18 10*3/MM3 (ref 3.4–10.8)

## 2020-02-10 PROCEDURE — 25010000002 DENOSUMAB 120 MG/1.7ML SOLUTION: Performed by: INTERNAL MEDICINE

## 2020-02-10 PROCEDURE — 83735 ASSAY OF MAGNESIUM: CPT | Performed by: INTERNAL MEDICINE

## 2020-02-10 PROCEDURE — 80053 COMPREHEN METABOLIC PANEL: CPT | Performed by: INTERNAL MEDICINE

## 2020-02-10 PROCEDURE — 96372 THER/PROPH/DIAG INJ SC/IM: CPT

## 2020-02-10 PROCEDURE — 36415 COLL VENOUS BLD VENIPUNCTURE: CPT

## 2020-02-10 PROCEDURE — 99214 OFFICE O/P EST MOD 30 MIN: CPT | Performed by: INTERNAL MEDICINE

## 2020-02-10 PROCEDURE — 84100 ASSAY OF PHOSPHORUS: CPT | Performed by: INTERNAL MEDICINE

## 2020-02-10 PROCEDURE — 85025 COMPLETE CBC W/AUTO DIFF WBC: CPT | Performed by: INTERNAL MEDICINE

## 2020-02-10 RX ADMIN — DENOSUMAB 120 MG: 120 INJECTION SUBCUTANEOUS at 16:34

## 2020-02-10 NOTE — PROGRESS NOTES
Subjective .     REASONS FOR FOLLOWUP:    1. Stage Ib ([id number]) right breast cancer: Diagnosed May 2010 with excisional biopsy for invasive ductal carcinoma, 1.3 cm, grade 2, ER 90%, AZ 80%, HER-2/peter negative (1+ IHC).  Subsequent right mastectomy in July 2010 with no residual breast malignancy, 1/5 sentinel lymph node with micrometastasis (0.25 mm).  Treated in the [facility] system with adjuvant AC ×4 cycles in 2010 (no taxanes administered due to underlying Charcot-Saloni-Tooth with peripheral neuropathy).  Adjuvant Femara (postmenopausal) initiated October 2010 with plan to treat ×10 years.  Genetic testing reportedly negative.  Developed osteopenia treated with Prolia beginning 2/27/13.  2. Recurrent/metastatic disease identified 10/8/17 involving thoracic spine with cord compression at T6, lumbosacral involvement, sternal and right sternoclavicular involvement.  Femara discontinued.  Radiation administered (in the [facility] system) to the thoracic spine beginning 10/19/17 treating T3-T9 to a dose of 24 gray in 6 fractions.  3. Evaluation with MRI 12/8/17 showing persistent T6 cord compression with persistent neurologic compromise requiring surgical treatment 12/11/17 with T6 laminectomy/corpectomy and T3-T9 fusion.  Pathology with metastatic carcinoma of breast origin, ER negative, AZ negative, HER-2/peter negative (1+ IHC).  4. Right pulmonary embolism 10/21/17 treated with Lovenox 1 mg/kg (100 mg) every 12 hours.  No evidence of DVT.  Lovenox held due to right gluteus hematoma 3/23/18 through 4/6/18.  Transitioned to oral Eliquis 5mg bid 1/28/19 (as of 2/25/19, patient still using previous supply of Lovenox).  5. Cancer related pain previously on Duragesic 50 µg patch every 72 hours and Dilaudid 4 mg as needed for breakthrough pain.  Narcotics subsequently discontinued with improvement in pain in January 2018.  6. Radiation therapy to L3 dural metastasis and left humerus initiated 1/15/18 (10 fractions),  completed 1/26/18  7. Monthly Xgeva initiated 1/23/18  8. Mild hypercalcemia of malignancy  9. Initiation of palliative oral single agent Xeloda 2/7/18 (2000 mg a.m., 1500 mg p.m. for 14/21 days).  10. 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  In reviewing prior CT scans, has been present for some time.  Monitor for now, if it enlarges consider surgical excision.  11. Cumulative side effects from Xeloda with fatigue, anorexia, diarrhea, increased tearing.  Alteration in dose/schedule with cycle 11 to 1500 mg twice a day for 7 days on followed by 7 days off.    HISTORY OF PRESENT ILLNESS:  The patient is a 59 y.o. year old female who is here for follow-up with the above-mentioned history.  The patient continues on Xeloda    History of Present Illness the patient returns today in follow-up continuing on oral Xeloda and also continuing on monthly Xgeva that is due today with laboratory studies to review.  1500 mg twice daily 7 days on followed by 7 days off.  She continues to tolerate this fairly well.  Hand-foot symptoms remain fairly stable although in the dry winter weather she has experienced somewhat more peeling in her hands.  She is using emollient cream frequently.  She was experiencing worsening symptoms of reflux and we prescribed Carafate however she reports that this did not help her in fact made her feel worse and she stopped the medication.  She did contact her GI physician and has been taking omeprazole 40 mg daily (2 x 20 mg) with improved symptoms and is trying not to eat at night.  She notes that her insomnia has worsened despite the use of temazepam 15 mg nightly.  She has a lot on her mind related to managing her aunts estate.  She denies any significant pain issues.  She continues anticoagulation with Eliquis without any  bleeding issues.  She denies any mucositis.  She denies any diarrhea.  She did undergo recent colonoscopy with Dr. Ocasio on 2020 with no significant abnormalities, notation of muscular hypertrophy and diverticulosis, biopsies taken with results pending.  Patient remains very active, ECOG performance status of 1.    Past Medical History:   Diagnosis Date   • Acute pulmonary embolism, cancer-related 10/2017   • Allergic rhinitis    • Arthritis     Right knee; left shoulder   • Cancer (CMS/HCC) 2010    Right breast   • Cancer (CMS/HCC) 10/2017    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   • Charcot-Saloni-Tooth disease    • CPAP (continuous positive airway pressure) dependence    • GERD (gastroesophageal reflux disease)    • H/O Colon polyps    • H/O Uterine fibroid    • Heart murmur    • Hyperlipidemia    • Hypertension    • PONV (postoperative nausea and vomiting)      Past Surgical History:   Procedure Laterality Date   • BLADDER SURGERY      Bladder lift   • BREAST RECONSTRUCTION, BREAST TISSUE EXPANDER INSERTION Right    • BREAST SURGERY Right 2010    Mastectomy   •  SECTION  ,    • CHOLECYSTECTOMY     • COLONOSCOPY     • HERNIA REPAIR  2015    [facility]   • HYSTERECTOMY      Partial   • KNEE SURGERY Right    • LEG SURGERY Left     Broken   • MASTECTOMY Right    • KS TREAT TIBIAL SHAFT FX, INTRAMED IMPLANT Left 2017    Procedure: TIBIA INTRAMEDULLARY NAIL/DON INSERTION;  Surgeon: Romero Shanks MD;  Location: [facility];  Service: Orthopedics   • THORACIC DECOMPRESSION POSTERIOR FUSION WITH INSTRUMENTATION N/A 2017    Procedure: T6 costotransversectomy and decompression with T3 to  T9 posterior spinal fusion with instrumentation with Jennifer Gonzalez and Nael Dennis Cellsaver;  Surgeon: Quan Nayak MD;  Location: [facility];  Service:        ONCOLOGIC HISTORY:  (History from previous dates can be found in the separate document.)    Current Outpatient Medications  on File Prior to Visit   Medication Sig Dispense Refill   • acetaminophen (TYLENOL) 500 MG tablet Take 500 mg by mouth Every 6 (Six) Hours As Needed for Mild Pain .     • calcium carbonate (OS-CARY) 600 MG tablet Take 600 mg by mouth 2 (Two) Times a Day With Meals.     • capecitabine (XELODA) 500 MG chemo tablet Take 3 tabs (1500 mg) by mouth twice a day for 7 days on then 7 days off. 84 tablet 3   • cholecalciferol (VITAMIN D3) 1000 units tablet Take 1,000 Units by mouth Daily.     • diazePAM (VALIUM) 10 MG tablet Take 1 tablet by mouth Every 12 (Twelve) Hours As Needed for Anxiety. 10 tablet 1   • diphenoxylate-atropine (LOMOTIL) 2.5-0.025 MG per tablet Take 1 tablet by mouth 4 (Four) Times a Day As Needed for Diarrhea. 60 tablet 0   • ELIQUIS 5 MG tablet tablet TAKE 1 TABLET BY MOUTH EVERY 12 HOURS 60 tablet 6   • FLONASE ALLERGY RELIEF 50 MCG/ACT nasal spray 2 sprays into each nostril daily.  11   • gabapentin (NEURONTIN) 300 MG capsule TAKE 1 CAPSULE BY MOUTH DAILY 90 capsule 1   • ketoconazole (NIZORAL) 2 % cream Apply  topically to the appropriate area as directed Daily. 15 g 0   • losartan (COZAAR) 50 MG tablet Take 1 tablet by mouth Daily. 90 tablet 2   • mesalamine (LIALDA) 1.2 g EC tablet TAKE 1 TABLET BY MOUTH TWICE DAILY 180 tablet 0   • metaxalone (SKELAXIN) 800 MG tablet Take 1 tablet by mouth 3 (Three) Times a Day As Needed for Muscle Spasms. 30 tablet 3   • omeprazole (PriLOSEC) 40 MG capsule TAKE 1 CAPSULE DAILY 90 capsule 2   • ondansetron ODT (ZOFRAN-ODT) 8 MG disintegrating tablet Take 1 tablet by mouth Every 8 (Eight) Hours As Needed for Nausea or Vomiting. 30 tablet 1   • phenazopyridine (PYRIDIUM) 200 MG tablet Take 200 mg by mouth 3 (Three) Times a Day As Needed for bladder spasms.     • prochlorperazine (COMPAZINE) 10 MG tablet Take 1 tablet by mouth Every 6 (Six) Hours As Needed for Nausea or Vomiting. 30 tablet 0   • sennosides-docusate sodium (SENOKOT-S) 8.6-50 MG tablet Take 2 tablets by  mouth 2 (Two) Times a Day. 90 tablet 2   • sucralfate (CARAFATE) 1 g tablet Take 1 tablet by mouth 4 (Four) Times a Day. 30 tablet 2   • temazepam (RESTORIL) 15 MG capsule Take 1 capsule by mouth At Night As Needed for Sleep. 30 capsule 1   • traMADol (ULTRAM) 50 MG tablet TAKE 1 TABLET BY MOUTH EVERY 8 HOURS AS NEEDED FOR MODERATE PAIN 90 tablet 1   • triamcinolone (KENALOG) 0.1 % cream Apply  topically to the appropriate area as directed 2 (Two) Times a Day. 15 g 0     No current facility-administered medications on file prior to visit.        ALLERGIES:     Allergies   Allergen Reactions   • Hydrocodone Nausea Only   • Morphine And Related Nausea And Vomiting     Social History     Socioeconomic History   • Marital status:      Spouse name: Murray   • Number of children: 3   • Years of education: College   • Highest education level: Not on file   Occupational History     Employer: [employer]     Employer: RETIRED   Tobacco Use   • Smoking status: Never Smoker   • Smokeless tobacco: Never Used   Substance and Sexual Activity   • Alcohol use: Yes     Comment: social   • Drug use: No   • Sexual activity: Defer     Family History   Problem Relation Age of Onset   • Heart disease Mother    • Hyperlipidemia Mother    • Hypertension Mother    • Diabetes Father    • Charcot-Saloni-Tooth disease Father    • Heart disease Father    • Hypertension Father    • Heart failure Father    • Diabetes Sister    • Heart disease Sister    • Hypertension Sister    • Heart disease Maternal Aunt    • Scoliosis Maternal Aunt    • Heart disease Maternal Uncle    • Diabetes Maternal Grandmother    • Heart disease Maternal Grandmother    • Hypertension Maternal Grandmother    • Uterine cancer Maternal Grandmother    • Heart disease Maternal Grandfather      Review of Systems   Constitutional: Positive for fatigue. Negative for activity change, appetite change, fever and unexpected weight change.   HENT: Negative for congestion, mouth  sores, nosebleeds, sore throat and voice change.    Eyes: Negative for discharge.   Respiratory: Negative for cough, shortness of breath and wheezing.    Cardiovascular: Negative for chest pain, palpitations and leg swelling.   Gastrointestinal: Negative for abdominal distention, abdominal pain, blood in stool, constipation, diarrhea, nausea and vomiting.   Endocrine: Negative for cold intolerance and heat intolerance.   Genitourinary: Negative for difficulty urinating, dysuria, flank pain, frequency, hematuria and urgency.   Musculoskeletal: Positive for arthralgias. Negative for back pain, joint swelling and myalgias.   Skin: Positive for rash. Negative for wound.   Neurological: Negative for dizziness, syncope, weakness, light-headedness, numbness and headaches.   Hematological: Negative for adenopathy. Does not bruise/bleed easily.   Psychiatric/Behavioral: Positive for sleep disturbance. Negative for confusion. The patient is not nervous/anxious.          Objective      Vitals:    02/10/20 1522   BP: 129/73   Pulse: 101   Resp: 16   Temp: 97.9 °F (36.6 °C)   SpO2: 97%      Current Status 2/10/2020   ECOG score 0   Pain 0/10    Physical Exam   Constitutional: She is oriented to person, place, and time. She appears well-developed and well-nourished.   HENT:   Mouth/Throat: Oropharynx is clear and moist.   Eyes: Conjunctivae are normal.   Neck: No thyromegaly present.   Cardiovascular: Normal rate and regular rhythm. Exam reveals no gallop and no friction rub.   No murmur heard.  Pulmonary/Chest: Breath sounds normal. No respiratory distress.   Abdominal: Soft. Bowel sounds are normal. She exhibits no distension. There is no tenderness.   Musculoskeletal: She exhibits no edema.   Lower extremity braces in place.  No significant lower extremity edema   Lymphadenopathy:        Head (right side): No submandibular adenopathy present.     She has no cervical adenopathy.     She has no axillary adenopathy.        Right:  No inguinal and no supraclavicular adenopathy present.        Left: No inguinal and no supraclavicular adenopathy present.   Neurological: She is alert and oriented to person, place, and time. She displays normal reflexes. No cranial nerve deficit. She exhibits normal muscle tone.   Bilateral lower extremity strength, 4+/5   Skin: Skin is warm and dry. Rash noted.   Mild erythema involving the palms of the hands.  Slight increase in degree of skin cracking in the hands.    Psychiatric: She has a normal mood and affect. Her behavior is normal.       RECENT LABS:  Hematology WBC   Date Value Ref Range Status   02/10/2020 7.18 3.40 - 10.80 10*3/mm3 Final   11/04/2019 4.09 3.40 - 10.80 10*3/mm3 Final     RBC   Date Value Ref Range Status   02/10/2020 3.89 3.77 - 5.28 10*6/mm3 Final   11/04/2019 4.26 3.77 - 5.28 10*6/mm3 Final     Hemoglobin   Date Value Ref Range Status   02/10/2020 12.5 12.0 - 15.9 g/dL Final     Hematocrit   Date Value Ref Range Status   02/10/2020 38.1 34.0 - 46.6 % Final     Platelets   Date Value Ref Range Status   02/10/2020 251 140 - 450 10*3/mm3 Final        Lab Results   Component Value Date    GLUCOSE 161 (H) 02/10/2020    BUN 16 02/10/2020    CREATININE 0.94 02/10/2020    EGFRIFNONA 61 02/10/2020    EGFRIFAFRI 66 11/04/2019    BCR 17.0 02/10/2020    K 4.4 02/10/2020    CO2 27.5 02/10/2020    CALCIUM 9.9 02/10/2020    PROTENTOTREF 6.3 11/04/2019    ALBUMIN 4.30 02/10/2020    LABIL2 2.3 11/04/2019    AST 17 02/10/2020    ALT 18 02/10/2020           Assessment/Plan      1. Previous Stage Ib ([id number]) right breast cancer:  · Diagnosed May 2010 with excisional biopsy for invasive ductal carcinoma, 1.3 cm, grade 2, ER 90%, IN 80%, HER-2/peter negative (1+ IHC).    · Subsequent right mastectomy in July 2010 with no residual breast malignancy, 1/5 sentinel lymph node with micrometastasis (0.25 mm).    · Treated in the [facility] system with adjuvant AC ×4 cycles in 2010 (no taxanes administered due to  underlying Charcot-Saloni-Tooth with peripheral neuropathy).    · Adjuvant Femara (postmenopausal) initiated October 2010 with plan to treat ×10 years.    · Genetic testing reportedly negative.    · Developed osteopenia treated with Prolia beginning 2/27/13. Subsequently discontinued due to identification of metastatic disease.  2. Recurrent/metastatic disease identified 10/8/17:  · Disease involving thoracic spine with cord compression at T6, lumbosacral involvement, sternal and right sternoclavicular involvement.    · Femara discontinued in 10/2017.    · Radiation administered (in the [facility] system) to the thoracic spine beginning 10/19/17 treating T3-T9 to a dose of 24 gray in 6 fractions.  · Evaluation with MRI 12/8/17 showing persistent T6 cord compression with persistent neurologic compromise requiring surgical treatment 12/11/17 with T6 laminectomy/corpectomy and T3-T9 fusion.  Pathology with metastatic carcinoma of breast origin, ER negative, IN negative, HER-2/peter negative (1+ IHC).  · Additional staging evaluation 12/8/17 with no evidence of visceral metastatic disease, bone scan showing involvement of thoracic spine, sternum, left humerus, mid frontal bone.  No plane film correlate of left humerus lesion.  MRI lumbar spine with small intradural L3 metastasis.  CA 15-3 12/6/17- 17.  · Palliative radiation therapy to L3 dural metastasis and left humerus initiated 1/15/18 (10 fractions), completed 1/26/18.  · Hypercalcemia of malignancy with calcium in the 10-11 range.  · Initiation of monthly Xgeva 1/23/18.  · Baseline CT scan 1/30/18 with no evidence of visceral involvement.  Cluster of nodular opacities in the right lower lobe suspected to be infectious or related to bronchiolitis. Bone scan 1/30/18 showed postsurgical change in the thoracic spine, stable uptake in the frontal bone, no new areas of disease.  · Initiation of palliative oral single agent Xeloda 2/7/18 2000 mg a.m., 1500 mg p.m. for 14/21  days.   · Following 3 cycles xeloda, bone scan 4/4/18 showed no change from the prior study.  CT scan 4/4/18 showed a small pericardial effusion of unclear significance as well as a subcutaneous nodule in the right lateral chest wall.  Subsequent evaluation with echocardiogram 4/17/18 showed no evidence of pericardial effusion.  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  We discussed the possibility of surgical excision of the right subcutaneous chest wall lesion for more definitive diagnosis.  Reviewed previous CT images dating back to 12/8/17 and the lesion was present even at that time measuring around 1.7 cm although not commented on in the radiology report.  As this appears to be an indolent low-grade process unrelated to her breast cancer, recommendee foregoing surgical excision at this time and monitoring this area on future scans.  The patient agreed.    · Following 6 cycles of Xeloda, CT 6/6/18  showed stable findings, no evidence of progressive disease.  There was a comment regarding subcutaneous abnormality in the anterior abdominal wall and this was related to Lovenox injection sites.  Bone scan 6/6/18 showed no interval change.   · CT scan and bone scan 8/13/18 following 9 cycles of Xeloda showed stable findings with no evidence of significant visceral metastases.  Her bone lesions appear stable on bone scan.  The spindle cell neoplasm in her right chest wall actually decreased in size from 2 cm down to 1.6 cm.    · The patient experienced some symptoms of diarrhea, anorexia, generalized weakness during cycle 9 Xeloda it was unclear whether this was related to a viral gastroenteritis or toxicity from treatment.  Symptoms recurred during cycle 10 and treatment was cut short by 2 days.  Symptoms attributed to Xeloda.  With cycle 11, dose and schedule altered to  1500 mg twice daily for 7 days on followed by 7 days off .  · Most recent 3-month interval scans with bone scan 12/9/2019 showing no changes.  CT scan 12/9/2019 with no significant changes..    · The patient returns today continuing on treatment with Xeloda 1500 mg twice daily 7 days on followed by 7 days off.  She is tolerating treatment reasonably well.  She is due for monthly Xgeva today.  the degree of skin cracking is increased somewhat in her hands recently related to the dry winter weather and she will continue to use emollient cream frequently.  She is not experiencing any other significant side effects from treatment.  She will receive Xgeva as planned today.  In 3 weeks she will undergo repeat CT chest abdomen pelvis and bone scan and I will see her in 4 weeks for follow-up when she will again be due for Xgeva.  In the interval she will continue on Xeloda as above.  3. Right pulmonary embolism:  · Diagnosed on CT angiogram 10/21/17 involving small right lower lobe pulmonary artery.  Lower extremity Dopplers negative.  · Bilateral lower extremity Dopplers negative again 12/5/17.  · Received chronic Lovenox 1 mg/kg twice per day, transition to oral Eliquis in February 2019, continuing on Eliquis 5 mg twice daily.  4. Cancer related pain:  · Previously receiving Duragesic 50 µg patch every 72 hours along with Dilaudid 4 mg as needed for breakthrough pain  · The patient's pain improved over time and she was able to discontinue both Duragesic and Dilaudid in the interval.  · Patient does take occasional Flexeril at bedtime due to back spasm/pain when she has been more active.  · The patient does have some occasional aggravation of her chronic back pain with significant rehabilitation activity and requires an occasional dose of Dilaudid.  No narcotic requirements recently.  5. Chemotherapy-induced diarrhea with subsequent C. difficile colitis in the setting of previous ulcerative colitis:  · Patient with  reported history of ulcerative colitis, is not on active therapy.  · The patient developed initial diarrhea related to Xeloda at regular dosing.  · Symptoms improved on reduced dose Xeloda  · Flare of symptoms in October 2018 with apparent finding of C. difficile colitis by GI, treated with course of oral vancomycin with improvement in symptoms.  · Patient notes minimal intermittent diarrhea on Xeloda requiring occasional dosing of Imodium.  More recently, she reports fairly normal bowel function.  6. Traumatic left tibia/fibular fracture:  · Status post ORIF 12/6/17  · Specimen was sent for pathologic review, negative for evidence of malignancy  7. Hypercalcemia:  · Suspect hypercalcemia of malignancy, calcium in  10-11 range previously.  · Calcium normalized following initiation of monthly Xgeva on 1/23/18.  8. Chemotherapy-induced mucositis:  · Patient had a minimal degree of mucositis with cycle 2.  The patient has magic mouthwash to use as needed.  No subsequent mucositis.  9. Recurrent UTI, bladder wall thickening on CT:  · Patient had an enterococcal UTI on 3/2/18 sensitive to nitrofurantoin and received treatment, unclear how long.  · Recurrent UTI 3/20/18 with urine culture growing Klebsiella, initially treated with nitrofurantoin, transitioned to Levaquin.  · CT 4/4/18 with diffuse bladder wall thickening with increased nodular thickening at the left base.  Referral to urogynecology Dr. May Johnson.  She was placed on a prophylactic dose of trimethoprim 100 mg daily, bladder wall thickening felt to be related to recent recurrent urinary tract infections.  · Patient with urinary symptoms, treated with course of Macrobid at the end of December 2018, urine culture however was negative 12/31/18.  · Patient was found to have Klebsiella UTI 7/29/2019 which was successfully treated with Macrobid with complete resolution of symptoms.  10.   Mobility:  · The patient underwent an intensive course of rehabilitation at  [facility].  She graduated from her outpatient course November 2018.  · Overall the patient has improved dramatically in terms of mobility, now walking around 1 mile per day without assistance.  11.  Depression:  · The patient weaned herself off of Cymbalta and reports that her symptoms remain stable.  12. 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  Symptoms did improve and she continued on the same dose.    · Symptoms in the hands have recently worsened slightly with increased skin cracking.  Patient will continue to use emollient cream frequently.  13. Elevated liver function studies:  · Liver function studies increased 8/20/19 with ALT 98, AST 70, normal total bilirubin.  · Negative viral hepatitis A, B, and C panel 8/23/18  · Likely related to hepatic steatosis seen on CT, no definitive evidence of metastatic liver lesions.   · Subsequent improvement in LFTs  · Today, LFTs are normal  14. Chemotherapy induced leukopenia:  · WBC today 7.18 with normal differential  15. GERD:  · The patient was experiencing increased symptoms of reflux when seen here last month and we did prescribe again Carafate however patient did not tolerate the medication well.  She did contact her GI physician and has been taking omeprazole 40 mg daily (2 x 20 mg) with improved symptoms.  She is avoiding eating at night which does help as well.  16. Insomnia:  · Patient with prior paradoxical reaction to Benadryl  · Improved previously on temazepam 15 mg nightly as needed.   · Today, patient reports that insomnia did worsen recently.  She has a lot on her mind managing her [place] state.  We did discuss options of switching to Ambien or trazodone however she does continue on Neurontin 300 mg nightly managed by Dr. Chanle.  We discussed potentially trying to increase the  gabapentin dose to 600 mg nightly and she will discuss this with Dr. Chanel.  17. Health maintenance:  · Patient notes that she has a history of colon polyps as well as ulcerative colitis and was due for a follow-up colonoscopy on 9/12/2019.  We did discuss there is no necessity to pursue colonoscopy in the setting of her metastatic breast cancer.    · The patient did undergo colonoscopy on 2/7/2020 with findings of muscular hypertrophy and diverticulosis.  Colonic biopsies taken with pathologic results pending.  18. Right shoulder pain:  · Patient was evaluated by Dr Forrester.  She has experienced difficulty over the past year and a half with her right shoulder although she has not complained of this in prior visits to our office.  She reports difficulty with abduction.    · The patient did undergo MRI of her right shoulder on 11/21/2019 at an outside facility showing multiple abnormalities including supraspinatus tendinosis, labral tear but no evidence of metastatic disease.  · Symptoms currently improved.    Plan:  1. Continue oral Xeloda 1500 mg twice a day 7 days on followed by 7 days off.  2. Monthly Xgeva today  3. Continue Eliquis 5 mg twice daily.  4. Continue use of emollient cream on the hands and feet and continue to wear socks and gloves at night  5. Continue omeprazole 40 mg daily.    6. The patient will discuss with Dr. Chanel regarding potential increase in gabapentin dose from 300 mg up to 600 mg nightly due to continued insomnia.  Temazepam is currently ineffective.  7. In 3 weeks CT chest abdomen pelvis and bone scan  8. In 4 weeks MD visit with CBC, CMP, magnesium, phosphorus.  Patient will be due for Xgeva.    Patient continuing on high risk medication requiring intensive monitoring.

## 2020-02-11 ENCOUNTER — SPECIALTY PHARMACY (OUTPATIENT)
Dept: PHARMACY | Facility: HOSPITAL | Age: 60
End: 2020-02-11

## 2020-02-24 ENCOUNTER — APPOINTMENT (OUTPATIENT)
Dept: CT IMAGING | Facility: HOSPITAL | Age: 60
End: 2020-02-24

## 2020-02-24 ENCOUNTER — APPOINTMENT (OUTPATIENT)
Dept: NUCLEAR MEDICINE | Facility: HOSPITAL | Age: 60
End: 2020-02-24

## 2020-03-03 ENCOUNTER — HOSPITAL ENCOUNTER (OUTPATIENT)
Dept: NUCLEAR MEDICINE | Facility: HOSPITAL | Age: 60
Discharge: HOME | End: 2020-03-03

## 2020-03-03 ENCOUNTER — HOSPITAL ENCOUNTER (OUTPATIENT)
Dept: CT IMAGING | Facility: HOSPITAL | Age: 60
Discharge: HOME | End: 2020-03-03
Admitting: INTERNAL MEDICINE

## 2020-03-03 DIAGNOSIS — Z17.1 MALIGNANT NEOPLASM OF OVERLAPPING SITES OF RIGHT BREAST IN FEMALE, ESTROGEN RECEPTOR NEGATIVE (HCC): ICD-10-CM

## 2020-03-03 DIAGNOSIS — C50.811 MALIGNANT NEOPLASM OF OVERLAPPING SITES OF RIGHT BREAST IN FEMALE, ESTROGEN RECEPTOR NEGATIVE (HCC): ICD-10-CM

## 2020-03-03 LAB — CREAT BLDA-MCNC: 1 MG/DL (ref 0.6–1.3)

## 2020-03-03 PROCEDURE — 71260 CT THORAX DX C+: CPT

## 2020-03-03 PROCEDURE — 0 TECHNETIUM MEDRONATE KIT: Performed by: INTERNAL MEDICINE

## 2020-03-03 PROCEDURE — 74177 CT ABD & PELVIS W/CONTRAST: CPT

## 2020-03-03 PROCEDURE — A9503 TC99M MEDRONATE: HCPCS | Performed by: INTERNAL MEDICINE

## 2020-03-03 PROCEDURE — 78306 BONE IMAGING WHOLE BODY: CPT

## 2020-03-03 PROCEDURE — 25010000002 IOPAMIDOL 61 % SOLUTION: Performed by: INTERNAL MEDICINE

## 2020-03-03 PROCEDURE — 82565 ASSAY OF CREATININE: CPT

## 2020-03-03 PROCEDURE — 0 DIATRIZOATE MEGLUMINE & SODIUM PER 1 ML: Performed by: INTERNAL MEDICINE

## 2020-03-03 RX ORDER — TC 99M MEDRONATE 20 MG/10ML
21.7 INJECTION, POWDER, LYOPHILIZED, FOR SOLUTION INTRAVENOUS
Status: COMPLETED | OUTPATIENT
Start: 2020-03-03 | End: 2020-03-03

## 2020-03-03 RX ADMIN — DIATRIZOATE MEGLUMINE AND DIATRIZOATE SODIUM 30 ML: 600; 100 SOLUTION ORAL; RECTAL at 08:03

## 2020-03-03 RX ADMIN — Medication 21.7 MILLICURIE: at 08:00

## 2020-03-03 RX ADMIN — IOPAMIDOL 85 ML: 612 INJECTION, SOLUTION INTRAVENOUS at 09:01

## 2020-03-09 ENCOUNTER — INFUSION (OUTPATIENT)
Dept: ONCOLOGY | Facility: HOSPITAL | Age: 60
End: 2020-03-09

## 2020-03-09 ENCOUNTER — OFFICE VISIT (OUTPATIENT)
Dept: ONCOLOGY | Facility: CLINIC | Age: 60
End: 2020-03-09

## 2020-03-09 ENCOUNTER — LAB (OUTPATIENT)
Dept: OTHER | Facility: HOSPITAL | Age: 60
End: 2020-03-09

## 2020-03-09 DIAGNOSIS — Z17.1 MALIGNANT NEOPLASM OF OVERLAPPING SITES OF RIGHT BREAST IN FEMALE, ESTROGEN RECEPTOR NEGATIVE (HCC): ICD-10-CM

## 2020-03-09 DIAGNOSIS — C50.811 MALIGNANT NEOPLASM OF OVERLAPPING SITES OF RIGHT BREAST IN FEMALE, ESTROGEN RECEPTOR NEGATIVE (HCC): ICD-10-CM

## 2020-03-09 DIAGNOSIS — C50.811 MALIGNANT NEOPLASM OF OVERLAPPING SITES OF RIGHT BREAST IN FEMALE, ESTROGEN RECEPTOR NEGATIVE (HCC): Primary | ICD-10-CM

## 2020-03-09 DIAGNOSIS — Z17.1 MALIGNANT NEOPLASM OF OVERLAPPING SITES OF RIGHT BREAST IN FEMALE, ESTROGEN RECEPTOR NEGATIVE (HCC): Primary | ICD-10-CM

## 2020-03-09 LAB
ALBUMIN SERPL-MCNC: 4 G/DL (ref 3.5–5.2)
ALBUMIN/GLOB SERPL: 1.7 G/DL
ALP SERPL-CCNC: 58 U/L (ref 39–117)
ALT SERPL W P-5'-P-CCNC: 18 U/L (ref 1–33)
ANION GAP SERPL CALCULATED.3IONS-SCNC: 13.7 MMOL/L (ref 5–15)
AST SERPL-CCNC: 20 U/L (ref 1–32)
BASOPHILS # BLD AUTO: 0.04 10*3/MM3 (ref 0–0.2)
BASOPHILS NFR BLD AUTO: 0.6 % (ref 0–1.5)
BILIRUB SERPL-MCNC: 0.4 MG/DL (ref 0.1–1.2)
BUN BLD-MCNC: 23 MG/DL (ref 6–20)
BUN/CREAT SERPL: 20.7 (ref 7–25)
CALCIUM SPEC-SCNC: 9.7 MG/DL (ref 8.6–10.5)
CHLORIDE SERPL-SCNC: 100 MMOL/L (ref 98–107)
CO2 SERPL-SCNC: 28.3 MMOL/L (ref 22–29)
CREAT BLD-MCNC: 1.11 MG/DL (ref 0.57–1)
DEPRECATED RDW RBC AUTO: 65.5 FL (ref 37–54)
EOSINOPHIL # BLD AUTO: 0.16 10*3/MM3 (ref 0–0.4)
EOSINOPHIL NFR BLD AUTO: 2.3 % (ref 0.3–6.2)
ERYTHROCYTE [DISTWIDTH] IN BLOOD BY AUTOMATED COUNT: 18.2 % (ref 12.3–15.4)
GFR SERPL CREATININE-BSD FRML MDRD: 50 ML/MIN/1.73
GLOBULIN UR ELPH-MCNC: 2.4 GM/DL
GLUCOSE BLD-MCNC: 130 MG/DL (ref 65–99)
HCT VFR BLD AUTO: 38.8 % (ref 34–46.6)
HGB BLD-MCNC: 12.7 G/DL (ref 12–15.9)
IMM GRANULOCYTES # BLD AUTO: 0.04 10*3/MM3 (ref 0–0.05)
IMM GRANULOCYTES NFR BLD AUTO: 0.6 % (ref 0–0.5)
LYMPHOCYTES # BLD AUTO: 1.52 10*3/MM3 (ref 0.7–3.1)
LYMPHOCYTES NFR BLD AUTO: 22.1 % (ref 19.6–45.3)
MAGNESIUM SERPL-MCNC: 2 MG/DL (ref 1.6–2.6)
MCH RBC QN AUTO: 32.7 PG (ref 26.6–33)
MCHC RBC AUTO-ENTMCNC: 32.7 G/DL (ref 31.5–35.7)
MCV RBC AUTO: 100 FL (ref 79–97)
MONOCYTES # BLD AUTO: 0.55 10*3/MM3 (ref 0.1–0.9)
MONOCYTES NFR BLD AUTO: 8 % (ref 5–12)
NEUTROPHILS # BLD AUTO: 4.57 10*3/MM3 (ref 1.7–7)
NEUTROPHILS NFR BLD AUTO: 66.4 % (ref 42.7–76)
NRBC BLD AUTO-RTO: 0 /100 WBC (ref 0–0.2)
PHOSPHATE SERPL-MCNC: 4.1 MG/DL (ref 2.5–4.5)
PLATELET # BLD AUTO: 197 10*3/MM3 (ref 140–450)
PMV BLD AUTO: 9.8 FL (ref 6–12)
POTASSIUM BLD-SCNC: 4.5 MMOL/L (ref 3.5–5.2)
PROT SERPL-MCNC: 6.4 G/DL (ref 6–8.5)
RBC # BLD AUTO: 3.88 10*6/MM3 (ref 3.77–5.28)
SODIUM BLD-SCNC: 142 MMOL/L (ref 136–145)
WBC NRBC COR # BLD: 6.88 10*3/MM3 (ref 3.4–10.8)

## 2020-03-09 PROCEDURE — 99215 OFFICE O/P EST HI 40 MIN: CPT | Performed by: INTERNAL MEDICINE

## 2020-03-09 PROCEDURE — 84100 ASSAY OF PHOSPHORUS: CPT | Performed by: INTERNAL MEDICINE

## 2020-03-09 PROCEDURE — 36415 COLL VENOUS BLD VENIPUNCTURE: CPT

## 2020-03-09 PROCEDURE — 80053 COMPREHEN METABOLIC PANEL: CPT | Performed by: INTERNAL MEDICINE

## 2020-03-09 PROCEDURE — 96372 THER/PROPH/DIAG INJ SC/IM: CPT

## 2020-03-09 PROCEDURE — 83735 ASSAY OF MAGNESIUM: CPT | Performed by: INTERNAL MEDICINE

## 2020-03-09 PROCEDURE — 85025 COMPLETE CBC W/AUTO DIFF WBC: CPT | Performed by: INTERNAL MEDICINE

## 2020-03-09 PROCEDURE — 25010000002 DENOSUMAB 120 MG/1.7ML SOLUTION: Performed by: INTERNAL MEDICINE

## 2020-03-09 RX ADMIN — DENOSUMAB 120 MG: 120 INJECTION SUBCUTANEOUS at 16:10

## 2020-03-09 NOTE — PROGRESS NOTES
Subjective .     REASONS FOR FOLLOWUP:    1. Stage Ib ([id number]) right breast cancer: Diagnosed May 2010 with excisional biopsy for invasive ductal carcinoma, 1.3 cm, grade 2, ER 90%, ND 80%, HER-2/peter negative (1+ IHC).  Subsequent right mastectomy in July 2010 with no residual breast malignancy, 1/5 sentinel lymph node with micrometastasis (0.25 mm).  Treated in the [facility] system with adjuvant AC ×4 cycles in 2010 (no taxanes administered due to underlying Charcot-Saloni-Tooth with peripheral neuropathy).  Adjuvant Femara (postmenopausal) initiated October 2010 with plan to treat ×10 years.  Genetic testing reportedly negative.  Developed osteopenia treated with Prolia beginning 2/27/13.  2. Recurrent/metastatic disease identified 10/8/17 involving thoracic spine with cord compression at T6, lumbosacral involvement, sternal and right sternoclavicular involvement.  Femara discontinued.  Radiation administered (in the [facility] system) to the thoracic spine beginning 10/19/17 treating T3-T9 to a dose of 24 gray in 6 fractions.  3. Evaluation with MRI 12/8/17 showing persistent T6 cord compression with persistent neurologic compromise requiring surgical treatment 12/11/17 with T6 laminectomy/corpectomy and T3-T9 fusion.  Pathology with metastatic carcinoma of breast origin, ER negative, ND negative, HER-2/peter negative (1+ IHC).  4. Right pulmonary embolism 10/21/17 treated with Lovenox 1 mg/kg (100 mg) every 12 hours.  No evidence of DVT.  Lovenox held due to right gluteus hematoma 3/23/18 through 4/6/18.  Transitioned to oral Eliquis 5mg bid 1/28/19 (as of 2/25/19, patient still using previous supply of Lovenox).  5. Cancer related pain previously on Duragesic 50 µg patch every 72 hours and Dilaudid 4 mg as needed for breakthrough pain.  Narcotics subsequently discontinued with improvement in pain in January 2018.  6. Radiation therapy to L3 dural metastasis and left humerus initiated 1/15/18 (10 fractions),  completed 1/26/18  7. Monthly Xgeva initiated 1/23/18  8. Mild hypercalcemia of malignancy  9. Initiation of palliative oral single agent Xeloda 2/7/18 (2000 mg a.m., 1500 mg p.m. for 14/21 days).  10. 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  In reviewing prior CT scans, has been present for some time.  Monitor for now, if it enlarges consider surgical excision.  11. Cumulative side effects from Xeloda with fatigue, anorexia, diarrhea, increased tearing.  Alteration in dose/schedule with cycle 11 to 1500 mg twice a day for 7 days on followed by 7 days off.    HISTORY OF PRESENT ILLNESS:  The patient is a 59 y.o. year old female who is here for follow-up with the above-mentioned history.  The patient continues on Xeloda    History of Present Illness the patient returns today in follow-up continuing on oral [id number] mg twice daily 7 days on followed by 7 days off and also continuing on monthly Xgeva that is due today with laboratory studies, CT scan, and bone scan to review.  The patient continues to do very well on treatment.  She has mild hand-foot symptoms which are stable with minimal skin cracking in the hands.  She continues to use emollient cream frequently and does wear gloves at night intermittently.  She recently experienced some mild sinus congestion however this has improved.  She has some occasional loose stools for which she takes Imodium intermittently reports that this is stable and mild.  She did increase her gabapentin dose to 600 mg nightly and reports that this was more effective in treating her insomnia.  She has not yet contacted Dr. Chanel's office in regards to her increase in dose.  She continues on anticoagulation with Eliquis and denies any bleeding issues.  She continues on omeprazole 40 mg daily and reports that  her reflux symptoms are improved.  She has only minimal fatigue.  She continues to be very active, ambulates relatively well with only the assistance from a cane.    Past Medical History:   Diagnosis Date   • Acute pulmonary embolism, cancer-related 10/2017   • Allergic rhinitis    • Arthritis     Right knee; left shoulder   • Cancer (CMS/Formerly [facility]) 2010    Right breast   • Cancer (CMS/Formerly [facility]) 10/2017    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   • Charcot-Saloni-Tooth disease    • CPAP (continuous positive airway pressure) dependence    • GERD (gastroesophageal reflux disease)    • H/O Colon polyps    • H/O Uterine fibroid    • Heart murmur    • Hyperlipidemia    • Hypertension    • PONV (postoperative nausea and vomiting)      Past Surgical History:   Procedure Laterality Date   • BLADDER SURGERY      Bladder lift   • BREAST RECONSTRUCTION, BREAST TISSUE EXPANDER INSERTION Right    • BREAST SURGERY Right 2010    Mastectomy   •  SECTION  1992   • CHOLECYSTECTOMY     • COLONOSCOPY     • HERNIA REPAIR  2015    [facility]   • HYSTERECTOMY      Partial   • KNEE SURGERY Right    • LEG SURGERY Left     Broken   • MASTECTOMY Right    • NH TREAT TIBIAL SHAFT FX, INTRAMED IMPLANT Left 2017    Procedure: TIBIA INTRAMEDULLARY NAIL/DON INSERTION;  Surgeon: Romero Shanks MD;  Location: [facility];  Service: Orthopedics   • THORACIC DECOMPRESSION POSTERIOR FUSION WITH INSTRUMENTATION N/A 2017    Procedure: T6 costotransversectomy and decompression with T3 to  T9 posterior spinal fusion with instrumentation with Jennifer Gonzalez and Nael Wilkes;  Surgeon: Quan Nayak MD;  Location: [facility];  Service:        ONCOLOGIC HISTORY:  (History from previous dates can be found in the separate document.)    Current Outpatient Medications on File Prior to Visit   Medication Sig Dispense Refill   • acetaminophen (TYLENOL) 500 MG tablet Take 500 mg by mouth Every 6 (Six) Hours As Needed for  Mild Pain .     • calcium carbonate (OS-CARY) 600 MG tablet Take 600 mg by mouth 2 (Two) Times a Day With Meals.     • capecitabine (XELODA) 500 MG chemo tablet Take 3 tabs (1500 mg) by mouth twice a day for 7 days on then 7 days off. 84 tablet 3   • cholecalciferol (VITAMIN D3) 1000 units tablet Take 1,000 Units by mouth Daily.     • diazePAM (VALIUM) 10 MG tablet Take 1 tablet by mouth Every 12 (Twelve) Hours As Needed for Anxiety. 10 tablet 1   • diphenoxylate-atropine (LOMOTIL) 2.5-0.025 MG per tablet Take 1 tablet by mouth 4 (Four) Times a Day As Needed for Diarrhea. 60 tablet 0   • ELIQUIS 5 MG tablet tablet TAKE 1 TABLET BY MOUTH EVERY 12 HOURS 60 tablet 6   • FLONASE ALLERGY RELIEF 50 MCG/ACT nasal spray 2 sprays into each nostril daily.  11   • gabapentin (NEURONTIN) 300 MG capsule TAKE 1 CAPSULE BY MOUTH DAILY 90 capsule 1   • ketoconazole (NIZORAL) 2 % cream Apply  topically to the appropriate area as directed Daily. 15 g 0   • losartan (COZAAR) 50 MG tablet Take 1 tablet by mouth Daily. 90 tablet 2   • mesalamine (LIALDA) 1.2 g EC tablet TAKE 1 TABLET BY MOUTH TWICE DAILY 180 tablet 0   • metaxalone (SKELAXIN) 800 MG tablet Take 1 tablet by mouth 3 (Three) Times a Day As Needed for Muscle Spasms. 30 tablet 3   • omeprazole (PriLOSEC) 40 MG capsule TAKE 1 CAPSULE DAILY 90 capsule 2   • ondansetron ODT (ZOFRAN-ODT) 8 MG disintegrating tablet Take 1 tablet by mouth Every 8 (Eight) Hours As Needed for Nausea or Vomiting. 30 tablet 1   • phenazopyridine (PYRIDIUM) 200 MG tablet Take 200 mg by mouth 3 (Three) Times a Day As Needed for bladder spasms.     • prochlorperazine (COMPAZINE) 10 MG tablet Take 1 tablet by mouth Every 6 (Six) Hours As Needed for Nausea or Vomiting. 30 tablet 0   • sennosides-docusate sodium (SENOKOT-S) 8.6-50 MG tablet Take 2 tablets by mouth 2 (Two) Times a Day. 90 tablet 2   • sucralfate (CARAFATE) 1 g tablet Take 1 tablet by mouth 4 (Four) Times a Day. 30 tablet 2   • temazepam  (RESTORIL) 15 MG capsule Take 1 capsule by mouth At Night As Needed for Sleep. 30 capsule 1   • traMADol (ULTRAM) 50 MG tablet TAKE 1 TABLET BY MOUTH EVERY 8 HOURS AS NEEDED FOR MODERATE PAIN 90 tablet 1   • triamcinolone (KENALOG) 0.1 % cream Apply  topically to the appropriate area as directed 2 (Two) Times a Day. 15 g 0     Current Facility-Administered Medications on File Prior to Visit   Medication Dose Route Frequency Provider Last Rate Last Dose   • [COMPLETED] denosumab (XGEVA) injection 120 mg  120 mg Subcutaneous Once Ubaldo Hancock Jr., MD   120 mg at 03/09/20 1610       ALLERGIES:     Allergies   Allergen Reactions   • Hydrocodone Nausea Only   • Morphine And Related Nausea And Vomiting     Social History     Socioeconomic History   • Marital status:      Spouse name: Murray   • Number of children: 3   • Years of education: College   • Highest education level: Not on file   Occupational History     Employer: [employer]     Employer: RETIRED   Tobacco Use   • Smoking status: Never Smoker   • Smokeless tobacco: Never Used   Substance and Sexual Activity   • Alcohol use: Yes     Comment: social   • Drug use: No   • Sexual activity: Defer     Family History   Problem Relation Age of Onset   • Heart disease Mother    • Hyperlipidemia Mother    • Hypertension Mother    • Diabetes Father    • Charcot-Saloni-Tooth disease Father    • Heart disease Father    • Hypertension Father    • Heart failure Father    • Diabetes Sister    • Heart disease Sister    • Hypertension Sister    • Heart disease Maternal Aunt    • Scoliosis Maternal Aunt    • Heart disease Maternal Uncle    • Diabetes Maternal Grandmother    • Heart disease Maternal Grandmother    • Hypertension Maternal Grandmother    • Uterine cancer Maternal Grandmother    • Heart disease Maternal Grandfather      Review of Systems   Constitutional: Positive for fatigue. Negative for activity change, appetite change, fever and unexpected weight change.    HENT: Negative for congestion, mouth sores, nosebleeds, sore throat and voice change.    Eyes: Negative for discharge.   Respiratory: Negative for cough, shortness of breath and wheezing.    Cardiovascular: Negative for chest pain, palpitations and leg swelling.   Gastrointestinal: Positive for diarrhea. Negative for abdominal distention, abdominal pain, blood in stool, constipation, nausea and vomiting.   Endocrine: Negative for cold intolerance and heat intolerance.   Genitourinary: Negative for difficulty urinating, dysuria, flank pain, frequency, hematuria and urgency.   Musculoskeletal: Positive for arthralgias. Negative for back pain, joint swelling and myalgias.   Skin: Positive for rash. Negative for wound.   Neurological: Negative for dizziness, syncope, weakness, light-headedness, numbness and headaches.   Hematological: Negative for adenopathy. Does not bruise/bleed easily.   Psychiatric/Behavioral: Positive for sleep disturbance. Negative for confusion. The patient is not nervous/anxious.          Objective      Vitals:    03/09/20 1508   BP: 129/83   Pulse: 104   Resp: 18   SpO2: 94%      Current Status 3/9/2020   ECOG score 1   Pain 0/10    Physical Exam   Constitutional: She is oriented to person, place, and time. She appears well-developed and well-nourished.   HENT:   Mouth/Throat: Oropharynx is clear and moist.   Eyes: Conjunctivae are normal.   Neck: No thyromegaly present.   Cardiovascular: Normal rate and regular rhythm. Exam reveals no gallop and no friction rub.   No murmur heard.  Pulmonary/Chest: Breath sounds normal. No respiratory distress.   Abdominal: Soft. Bowel sounds are normal. She exhibits no distension. There is no tenderness.   Musculoskeletal: She exhibits no edema.   Lower extremity braces in place.  No significant lower extremity edema   Lymphadenopathy:        Head (right side): No submandibular adenopathy present.     She has no cervical adenopathy.     She has no axillary  adenopathy.        Right: No inguinal and no supraclavicular adenopathy present.        Left: No inguinal and no supraclavicular adenopathy present.   Neurological: She is alert and oriented to person, place, and time. She displays normal reflexes. No cranial nerve deficit. She exhibits normal muscle tone.   Bilateral lower extremity strength, 4+/5   Skin: Skin is warm and dry. Rash noted.   Mild erythema involving the palms of the hands.  Slight skin cracking in the hands.    Psychiatric: She has a normal mood and affect. Her behavior is normal.       RECENT LABS:  Hematology WBC   Date Value Ref Range Status   03/09/2020 6.88 3.40 - 10.80 10*3/mm3 Final   11/04/2019 4.09 3.40 - 10.80 10*3/mm3 Final     RBC   Date Value Ref Range Status   03/09/2020 3.88 3.77 - 5.28 10*6/mm3 Final   11/04/2019 4.26 3.77 - 5.28 10*6/mm3 Final     Hemoglobin   Date Value Ref Range Status   03/09/2020 12.7 12.0 - 15.9 g/dL Final     Hematocrit   Date Value Ref Range Status   03/09/2020 38.8 34.0 - 46.6 % Final     Platelets   Date Value Ref Range Status   03/09/2020 197 140 - 450 10*3/mm3 Final        Lab Results   Component Value Date    GLUCOSE 130 (H) 03/09/2020    BUN 23 (H) 03/09/2020    CREATININE 1.11 (H) 03/09/2020    EGFRIFNONA 50 (L) 03/09/2020    EGFRIFAFRI 66 11/04/2019    BCR 20.7 03/09/2020    K 4.5 03/09/2020    CO2 28.3 03/09/2020    CALCIUM 9.7 03/09/2020    PROTENTOTREF 6.3 11/04/2019    ALBUMIN 4.00 03/09/2020    LABIL2 2.3 11/04/2019    AST 20 03/09/2020    ALT 18 03/09/2020     Reviewed CT scans and bone scan from 3/3/2020 as outlined below.  I did personally review CT images today.      Assessment/Plan      1. Previous Stage Ib ([id number]) right breast cancer:  · Diagnosed May 2010 with excisional biopsy for invasive ductal carcinoma, 1.3 cm, grade 2, ER 90%, AZ 80%, HER-2/peter negative (1+ IHC).    · Subsequent right mastectomy in July 2010 with no residual breast malignancy, 1/5 sentinel lymph node with  micrometastasis (0.25 mm).    · Treated in the [facility] system with adjuvant AC ×4 cycles in 2010 (no taxanes administered due to underlying Charcot-Saloni-Tooth with peripheral neuropathy).    · Adjuvant Femara (postmenopausal) initiated October 2010 with plan to treat ×10 years.    · Genetic testing reportedly negative.    · Developed osteopenia treated with Prolia beginning 2/27/13. Subsequently discontinued due to identification of metastatic disease.  2. Recurrent/metastatic disease identified 10/8/17:  · Disease involving thoracic spine with cord compression at T6, lumbosacral involvement, sternal and right sternoclavicular involvement.    · Femara discontinued in 10/2017.    · Radiation administered (in the [facility] system) to the thoracic spine beginning 10/19/17 treating T3-T9 to a dose of 24 gray in 6 fractions.  · Evaluation with MRI 12/8/17 showing persistent T6 cord compression with persistent neurologic compromise requiring surgical treatment 12/11/17 with T6 laminectomy/corpectomy and T3-T9 fusion.  Pathology with metastatic carcinoma of breast origin, ER negative, MS negative, HER-2/peter negative (1+ IHC).  · Additional staging evaluation 12/8/17 with no evidence of visceral metastatic disease, bone scan showing involvement of thoracic spine, sternum, left humerus, mid frontal bone.  No plane film correlate of left humerus lesion.  MRI lumbar spine with small intradural L3 metastasis.  CA 15-3 12/6/17- 17.  · Palliative radiation therapy to L3 dural metastasis and left humerus initiated 1/15/18 (10 fractions), completed 1/26/18.  · Hypercalcemia of malignancy with calcium in the 10-11 range.  · Initiation of monthly Xgeva 1/23/18.  · Baseline CT scan 1/30/18 with no evidence of visceral involvement.  Cluster of nodular opacities in the right lower lobe suspected to be infectious or related to bronchiolitis. Bone scan 1/30/18 showed postsurgical change in the thoracic spine, stable uptake in the frontal  bone, no new areas of disease.  · Initiation of palliative oral single agent Xeloda 2/7/18 2000 mg a.m., 1500 mg p.m. for 14/21 days.   · Following 3 cycles xeloda, bone scan 4/4/18 showed no change from the prior study.  CT scan 4/4/18 showed a small pericardial effusion of unclear significance as well as a subcutaneous nodule in the right lateral chest wall.  Subsequent evaluation with echocardiogram 4/17/18 showed no evidence of pericardial effusion.  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  We discussed the possibility of surgical excision of the right subcutaneous chest wall lesion for more definitive diagnosis.  Reviewed previous CT images dating back to 12/8/17 and the lesion was present even at that time measuring around 1.7 cm although not commented on in the radiology report.  As this appears to be an indolent low-grade process unrelated to her breast cancer, recommendee foregoing surgical excision at this time and monitoring this area on future scans.  The patient agreed.    · Following 6 cycles of Xeloda, CT 6/6/18  showed stable findings, no evidence of progressive disease.  There was a comment regarding subcutaneous abnormality in the anterior abdominal wall and this was related to Lovenox injection sites.  Bone scan 6/6/18 showed no interval change.   · CT scan and bone scan 8/13/18 following 9 cycles of Xeloda showed stable findings with no evidence of significant visceral metastases.  Her bone lesions appear stable on bone scan.  The spindle cell neoplasm in her right chest wall actually decreased in size from 2 cm down to 1.6 cm.    · The patient experienced some symptoms of diarrhea, anorexia, generalized weakness during cycle 9 Xeloda it was unclear whether this was related to a viral gastroenteritis or toxicity from treatment.  Symptoms recurred  during cycle 10 and treatment was cut short by 2 days.  Symptoms attributed to Xeloda.  With cycle 11, dose and schedule altered to 1500 mg twice daily for 7 days on followed by 7 days off .  · Most recent 3-month interval scans with bone scan 3/3/2020 showing no changes.  CT scan 3/3/2020 with no significant changes..    · The patient returns today continuing on treatment with Xeloda 1500 mg twice daily 7 days on followed by 7 days off.  She is tolerating treatment reasonably well.  She is due for monthly Xgeva today.  She has a 3-month interval CT scans and bone scan to review today from 3/3/2020.  Scans showed no change from prior studies, no evidence of disease progression in bone and no evidence to suggest visceral involvement.  She is tolerating treatment reasonably well.  Hand-foot symptoms remain stable.  She has minimal loose stools.  We will continue on with treatment and anticipate repeat scans again in 3 months.  She will receive monthly Xgeva today.  She will return in 1 month for nurse practitioner visit and Xgeva and I will see her in 2 months for Xgeva and we will go ahead and schedule her scans for 3 months when I will again see her to review results.  3. Right pulmonary embolism:  · Diagnosed on CT angiogram 10/21/17 involving small right lower lobe pulmonary artery.  Lower extremity Dopplers negative.  · Bilateral lower extremity Dopplers negative again 12/5/17.  · Received chronic Lovenox 1 mg/kg twice per day, transition to oral Eliquis in February 2019, continuing on Eliquis 5 mg twice daily.  4. Cancer related pain:  · Previously receiving Duragesic 50 µg patch every 72 hours along with Dilaudid 4 mg as needed for breakthrough pain  · The patient's pain improved over time and she was able to discontinue both Duragesic and Dilaudid in the interval.  · Patient does take occasional Flexeril at bedtime due to back spasm/pain when she has been more active.  · The patient does have some occasional  aggravation of her chronic back pain with significant rehabilitation activity and requires an occasional dose of Dilaudid.  No narcotic requirements recently.  5. Chemotherapy-induced diarrhea with subsequent C. difficile colitis in the setting of previous ulcerative colitis:  · Patient with reported history of ulcerative colitis, is not on active therapy.  · The patient developed initial diarrhea related to Xeloda at regular dosing.  · Symptoms improved on reduced dose Xeloda  · Flare of symptoms in October 2018 with apparent finding of C. difficile colitis by GI, treated with course of oral vancomycin with improvement in symptoms.  · Patient notes minimal intermittent diarrhea on Xeloda requiring occasional dosing of Imodium.  6. Traumatic left tibia/fibular fracture:  · Status post ORIF 12/6/17  · Specimen was sent for pathologic review, negative for evidence of malignancy  7. Hypercalcemia:  · Suspect hypercalcemia of malignancy, calcium in  10-11 range previously.  · Calcium normalized following initiation of monthly Xgeva on 1/23/18.  8. Chemotherapy-induced mucositis:  · Patient had a minimal degree of mucositis with cycle 2.  The patient has magic mouthwash to use as needed.  No subsequent mucositis.  9. Recurrent UTI, bladder wall thickening on CT:  · Patient had an enterococcal UTI on 3/2/18 sensitive to nitrofurantoin and received treatment, unclear how long.  · Recurrent UTI 3/20/18 with urine culture growing Klebsiella, initially treated with nitrofurantoin, transitioned to Levaquin.  · CT 4/4/18 with diffuse bladder wall thickening with increased nodular thickening at the left base.  Referral to urogynecology Dr. May Johnson.  She was placed on a prophylactic dose of trimethoprim 100 mg daily, bladder wall thickening felt to be related to recent recurrent urinary tract infections.  · Patient with urinary symptoms, treated with course of Macrobid at the end of December 2018, urine culture however was  negative 12/31/18.  · Patient was found to have Klebsiella UTI 7/29/2019 which was successfully treated with Macrobid with complete resolution of symptoms.  10.   Mobility:  · The patient underwent an intensive course of rehabilitation at [facility].  She graduated from her outpatient course November 2018.  · Overall the patient has improved dramatically in terms of mobility, now walking around 1 mile per day without assistance.  11.  Depression:  · The patient weaned herself off of Cymbalta and reports that her symptoms remain stable.  12. 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  Symptoms did improve and she continued on the same dose.    · Symptoms in the hands are currently stable with mild skin cracking.  Patient will continue to use emollient cream frequently and was recommended to wear cotton gloves at night.  13. Elevated liver function studies:  · Liver function studies increased 8/20/19 with ALT 98, AST 70, normal total bilirubin.  · Negative viral hepatitis A, B, and C panel 8/23/18  · Likely related to hepatic steatosis seen on CT, no definitive evidence of metastatic liver lesions.   · Subsequent improvement in LFTs  · Today, LFTs are normal  14. Chemotherapy induced leukopenia:  · WBC today 6.88 with normal differential  15. GERD:  · The patient was experiencing increased symptoms of reflux when seen here last month and we did prescribe again Carafate however patient did not tolerate the medication well.  She did contact her GI physician and has been taking omeprazole 40 mg daily (2 x 20 mg) with improved symptoms.  She is avoiding eating at night which does help as well.  16. Insomnia:  · Patient with prior paradoxical reaction to Benadryl  · Improved previously on temazepam 15 mg nightly as needed.   · Patient noted  subsequently that temazepam was having no effect.  Last month did suggest that she increase her gabapentin dosing to 600 mg nightly and this has helped.  Dr. Chanel does prescribe gabapentin to her and I have asked her to communicate her dose changed to their office.  17. Health maintenance:  · Patient notes that she has a history of colon polyps as well as ulcerative colitis and was due for a follow-up colonoscopy on 9/12/2019.  We did discuss there is no necessity to pursue colonoscopy in the setting of her metastatic breast cancer.    · The patient did undergo colonoscopy on 2/7/2020 with findings of muscular hypertrophy and diverticulosis.  Colonic biopsies taken with pathologic results pending.  18. Right shoulder pain:  · Patient was evaluated by Dr Forrester.  She has experienced difficulty over the past year and a half with her right shoulder although she has not complained of this in prior visits to our office.  She reports difficulty with abduction.    · The patient did undergo MRI of her right shoulder on 11/21/2019 at an outside facility showing multiple abnormalities including supraspinatus tendinosis, labral tear but no evidence of metastatic disease.  · Symptoms currently improved/resolved.    Plan:  1. Continue oral Xeloda 1500 mg twice a day 7 days on followed by 7 days off.  2. Monthly Xgeva today  3. Continue Eliquis 5 mg twice daily.  4. Continue use of emollient cream on the hands and feet and continue to wear socks and gloves at night  5. Continue omeprazole 40 mg daily.    6. The patient will notify Dr. Chanel regarding recent increase in gabapentin dose from 300 mg up to 600 mg nightly due to continued insomnia.  Temazepam is currently ineffective.  7. In 4 weeks nurse practitioner visit with CBC, CMP, magnesium, phosphorus and monthly Xgeva  8. In 8 weeks MD visit with CBC, CMP, magnesium, phosphorus and monthly Xgeva  9. In 11 weeks CT chest abdomen pelvis and bone scan  10. In 12 weeks MD  visit with CBC, CMP, magnesium, phosphorus and monthly Xgeva.    Patient continuing on high risk medication requiring intensive monitoring.

## 2020-03-10 VITALS
BODY MASS INDEX: 37.52 KG/M2 | RESPIRATION RATE: 18 BRPM | WEIGHT: 198.7 LBS | HEIGHT: 61 IN | HEART RATE: 104 BPM | SYSTOLIC BLOOD PRESSURE: 129 MMHG | OXYGEN SATURATION: 94 % | DIASTOLIC BLOOD PRESSURE: 83 MMHG

## 2020-03-12 ENCOUNTER — SPECIALTY PHARMACY (OUTPATIENT)
Dept: PHARMACY | Facility: HOSPITAL | Age: 60
End: 2020-03-12

## 2020-03-13 RX ORDER — GABAPENTIN 300 MG/1
300 CAPSULE ORAL DAILY
Qty: 90 CAPSULE | Refills: 1 | Status: SHIPPED | OUTPATIENT
Start: 2020-03-13 | End: 2020-03-25 | Stop reason: SDUPTHER

## 2020-03-25 ENCOUNTER — TELEPHONE (OUTPATIENT)
Dept: INTERNAL MEDICINE | Facility: CLINIC | Age: 60
End: 2020-03-25

## 2020-03-25 DIAGNOSIS — G89.29 CHRONIC RIGHT-SIDED THORACIC BACK PAIN: Primary | ICD-10-CM

## 2020-03-25 DIAGNOSIS — M54.6 CHRONIC RIGHT-SIDED THORACIC BACK PAIN: Primary | ICD-10-CM

## 2020-03-25 RX ORDER — GABAPENTIN 300 MG/1
300 CAPSULE ORAL DAILY
Qty: 10 CAPSULE | Refills: 1 | Status: SHIPPED | OUTPATIENT
Start: 2020-03-25 | End: 2020-07-20 | Stop reason: SDUPTHER

## 2020-03-25 NOTE — TELEPHONE ENCOUNTER
[facility] SENT PATIENT'S GABAPENTIN WAS SENT TO PATIENT'S OLD ADDRESS. PHARMACY IS REQUESTING A 10 DAY SUPPLY.   REF# [id number]. PLEASE ADVISE.

## 2020-04-06 ENCOUNTER — INFUSION (OUTPATIENT)
Dept: ONCOLOGY | Facility: HOSPITAL | Age: 60
End: 2020-04-06

## 2020-04-06 ENCOUNTER — OFFICE VISIT (OUTPATIENT)
Dept: ONCOLOGY | Facility: CLINIC | Age: 60
End: 2020-04-06

## 2020-04-06 ENCOUNTER — APPOINTMENT (OUTPATIENT)
Dept: OTHER | Facility: HOSPITAL | Age: 60
End: 2020-04-06

## 2020-04-06 VITALS
RESPIRATION RATE: 16 BRPM | BODY MASS INDEX: 37.14 KG/M2 | HEIGHT: 61 IN | TEMPERATURE: 98 F | WEIGHT: 196.7 LBS | OXYGEN SATURATION: 99 % | DIASTOLIC BLOOD PRESSURE: 73 MMHG | HEART RATE: 88 BPM | SYSTOLIC BLOOD PRESSURE: 122 MMHG

## 2020-04-06 DIAGNOSIS — C50.811 MALIGNANT NEOPLASM OF OVERLAPPING SITES OF RIGHT BREAST IN FEMALE, ESTROGEN RECEPTOR NEGATIVE (HCC): ICD-10-CM

## 2020-04-06 DIAGNOSIS — Z17.1 MALIGNANT NEOPLASM OF OVERLAPPING SITES OF RIGHT BREAST IN FEMALE, ESTROGEN RECEPTOR NEGATIVE (HCC): Primary | ICD-10-CM

## 2020-04-06 DIAGNOSIS — C79.51 BONE METASTASES: Primary | ICD-10-CM

## 2020-04-06 DIAGNOSIS — Z79.899 HIGH RISK MEDICATION USE: ICD-10-CM

## 2020-04-06 DIAGNOSIS — C50.811 MALIGNANT NEOPLASM OF OVERLAPPING SITES OF RIGHT BREAST IN FEMALE, ESTROGEN RECEPTOR NEGATIVE (HCC): Primary | ICD-10-CM

## 2020-04-06 DIAGNOSIS — Z17.1 MALIGNANT NEOPLASM OF OVERLAPPING SITES OF RIGHT BREAST IN FEMALE, ESTROGEN RECEPTOR NEGATIVE (HCC): ICD-10-CM

## 2020-04-06 LAB
ALBUMIN SERPL-MCNC: 4.1 G/DL (ref 3.5–5.2)
ALBUMIN/GLOB SERPL: 1.9 G/DL
ALP SERPL-CCNC: 70 U/L (ref 39–117)
ALT SERPL W P-5'-P-CCNC: 23 U/L (ref 1–33)
ANION GAP SERPL CALCULATED.3IONS-SCNC: 9.1 MMOL/L (ref 5–15)
AST SERPL-CCNC: 22 U/L (ref 1–32)
BASOPHILS # BLD AUTO: 0.04 10*3/MM3 (ref 0–0.2)
BASOPHILS NFR BLD AUTO: 1 % (ref 0–1.5)
BILIRUB SERPL-MCNC: 0.4 MG/DL (ref 0.1–1.2)
BUN BLD-MCNC: 22 MG/DL (ref 6–20)
BUN/CREAT SERPL: 23.4 (ref 7–25)
CALCIUM SPEC-SCNC: 9.3 MG/DL (ref 8.6–10.5)
CHLORIDE SERPL-SCNC: 102 MMOL/L (ref 98–107)
CO2 SERPL-SCNC: 30.9 MMOL/L (ref 22–29)
CREAT BLD-MCNC: 0.94 MG/DL (ref 0.57–1)
DEPRECATED RDW RBC AUTO: 67.9 FL (ref 37–54)
EOSINOPHIL # BLD AUTO: 0.09 10*3/MM3 (ref 0–0.4)
EOSINOPHIL NFR BLD AUTO: 2.2 % (ref 0.3–6.2)
ERYTHROCYTE [DISTWIDTH] IN BLOOD BY AUTOMATED COUNT: 18.2 % (ref 12.3–15.4)
GFR SERPL CREATININE-BSD FRML MDRD: 61 ML/MIN/1.73
GLOBULIN UR ELPH-MCNC: 2.2 GM/DL
GLUCOSE BLD-MCNC: 115 MG/DL (ref 65–99)
HCT VFR BLD AUTO: 36 % (ref 34–46.6)
HGB BLD-MCNC: 12 G/DL (ref 12–15.9)
IMM GRANULOCYTES # BLD AUTO: 0.05 10*3/MM3 (ref 0–0.05)
IMM GRANULOCYTES NFR BLD AUTO: 1.2 % (ref 0–0.5)
LYMPHOCYTES # BLD AUTO: 1.31 10*3/MM3 (ref 0.7–3.1)
LYMPHOCYTES NFR BLD AUTO: 32.4 % (ref 19.6–45.3)
MAGNESIUM SERPL-MCNC: 2.4 MG/DL (ref 1.6–2.6)
MCH RBC QN AUTO: 34.1 PG (ref 26.6–33)
MCHC RBC AUTO-ENTMCNC: 33.3 G/DL (ref 31.5–35.7)
MCV RBC AUTO: 102.3 FL (ref 79–97)
MONOCYTES # BLD AUTO: 0.63 10*3/MM3 (ref 0.1–0.9)
MONOCYTES NFR BLD AUTO: 15.6 % (ref 5–12)
NEUTROPHILS # BLD AUTO: 1.92 10*3/MM3 (ref 1.7–7)
NEUTROPHILS NFR BLD AUTO: 47.6 % (ref 42.7–76)
NRBC BLD AUTO-RTO: 0.5 /100 WBC (ref 0–0.2)
PHOSPHATE SERPL-MCNC: 2.8 MG/DL (ref 2.5–4.5)
PLATELET # BLD AUTO: 252 10*3/MM3 (ref 140–450)
PMV BLD AUTO: 9.7 FL (ref 6–12)
POTASSIUM BLD-SCNC: 4.9 MMOL/L (ref 3.5–5.2)
PROT SERPL-MCNC: 6.3 G/DL (ref 6–8.5)
RBC # BLD AUTO: 3.52 10*6/MM3 (ref 3.77–5.28)
SODIUM BLD-SCNC: 142 MMOL/L (ref 136–145)
WBC NRBC COR # BLD: 4.04 10*3/MM3 (ref 3.4–10.8)

## 2020-04-06 PROCEDURE — 25010000002 DENOSUMAB 120 MG/1.7ML SOLUTION: Performed by: NURSE PRACTITIONER

## 2020-04-06 PROCEDURE — 80053 COMPREHEN METABOLIC PANEL: CPT | Performed by: INTERNAL MEDICINE

## 2020-04-06 PROCEDURE — 83735 ASSAY OF MAGNESIUM: CPT | Performed by: INTERNAL MEDICINE

## 2020-04-06 PROCEDURE — 96372 THER/PROPH/DIAG INJ SC/IM: CPT

## 2020-04-06 PROCEDURE — 84100 ASSAY OF PHOSPHORUS: CPT | Performed by: INTERNAL MEDICINE

## 2020-04-06 PROCEDURE — 99213 OFFICE O/P EST LOW 20 MIN: CPT | Performed by: NURSE PRACTITIONER

## 2020-04-06 PROCEDURE — 85025 COMPLETE CBC W/AUTO DIFF WBC: CPT | Performed by: INTERNAL MEDICINE

## 2020-04-06 RX ORDER — TEMAZEPAM 15 MG/1
15 CAPSULE ORAL NIGHTLY PRN
Qty: 30 CAPSULE | Refills: 1 | Status: SHIPPED | OUTPATIENT
Start: 2020-04-06 | End: 2020-06-04 | Stop reason: SDUPTHER

## 2020-04-06 RX ADMIN — DENOSUMAB 120 MG: 120 INJECTION SUBCUTANEOUS at 12:44

## 2020-04-06 NOTE — PROGRESS NOTES
Subjective .     REASONS FOR FOLLOWUP:    1. Stage Ib ([id number]) right breast cancer: Diagnosed May 2010 with excisional biopsy for invasive ductal carcinoma, 1.3 cm, grade 2, ER 90%, TX 80%, HER-2/peter negative (1+ IHC).  Subsequent right mastectomy in July 2010 with no residual breast malignancy, 1/5 sentinel lymph node with micrometastasis (0.25 mm).  Treated in the [facility] system with adjuvant AC ×4 cycles in 2010 (no taxanes administered due to underlying Charcot-Saloni-Tooth with peripheral neuropathy).  Adjuvant Femara (postmenopausal) initiated October 2010 with plan to treat ×10 years.  Genetic testing reportedly negative.  Developed osteopenia treated with Prolia beginning 2/27/13.  2. Recurrent/metastatic disease identified 10/8/17 involving thoracic spine with cord compression at T6, lumbosacral involvement, sternal and right sternoclavicular involvement.  Femara discontinued.  Radiation administered (in the [facility] system) to the thoracic spine beginning 10/19/17 treating T3-T9 to a dose of 24 gray in 6 fractions.  3. Evaluation with MRI 12/8/17 showing persistent T6 cord compression with persistent neurologic compromise requiring surgical treatment 12/11/17 with T6 laminectomy/corpectomy and T3-T9 fusion.  Pathology with metastatic carcinoma of breast origin, ER negative, TX negative, HER-2/peter negative (1+ IHC).  4. Right pulmonary embolism 10/21/17 treated with Lovenox 1 mg/kg (100 mg) every 12 hours.  No evidence of DVT.  Lovenox held due to right gluteus hematoma 3/23/18 through 4/6/18.  Transitioned to oral Eliquis 5mg bid 1/28/19 (as of 2/25/19, patient still using previous supply of Lovenox).  5. Cancer related pain previously on Duragesic 50 µg patch every 72 hours and Dilaudid 4 mg as needed for breakthrough pain.  Narcotics subsequently discontinued with improvement in pain in January 2018.  6. Radiation therapy to L3 dural metastasis and left humerus initiated 1/15/18 (10 fractions),  completed 1/26/18  7. Monthly Xgeva initiated 1/23/18  8. Mild hypercalcemia of malignancy  9. Initiation of palliative oral single agent Xeloda 2/7/18 (2000 mg a.m., 1500 mg p.m. for 14/21 days).  10. 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  In reviewing prior CT scans, has been present for some time.  Monitor for now, if it enlarges consider surgical excision.  11. Cumulative side effects from Xeloda with fatigue, anorexia, diarrhea, increased tearing.  Alteration in dose/schedule with cycle 11 to 1500 mg twice a day for 7 days on followed by 7 days off.    HISTORY OF PRESENT ILLNESS:  The patient is a 59 y.o. year old female who is here for follow-up with the above-mentioned history.  The patient continues on Xeloda    History of Present Illness Ms. Davey returns today for follow-up, due for monthly Xgeva.  She continues on Xeloda 1500 mg twice a day for 7 days on, 7 days off.  She is currently in her off week and will start back Wednesday of this week.  In light of the COVID-19 pandemic she has been washing her hands a lot more and is noting some peeling of her fingertips though nothing significant.  She denies any trouble with her feet.  She is still trying to use emollient creams and hydrate the skin is much as she can.    She notes a few episodes of diarrhea but nothing significant.  She has Imodium if necessary.    Patient does take gabapentin nightly, previously taking 300 mg but recently per Dr. Akers's office increase this to 600 mg nightly.  She is taking this along with temazepam for sleep and notes improvement of her insomnia overall.  She is requesting a refill of the temazepam.    She continues on anticoagulation with Eliquis without difficulty.    She denies other concerns at this time.    Past Medical History:   Diagnosis  Date   • Acute pulmonary embolism, cancer-related 10/2017   • Allergic rhinitis    • Arthritis     Right knee; left shoulder   • Cancer (CMS/[facility]) 2010    Right breast   • Cancer (CMS/[facility]) 10/2017    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   • Charcot-Saloni-Tooth disease    • CPAP (continuous positive airway pressure) dependence    • GERD (gastroesophageal reflux disease)    • H/O Colon polyps    • H/O Uterine fibroid    • Heart murmur    • Hyperlipidemia    • Hypertension    • PONV (postoperative nausea and vomiting)      Past Surgical History:   Procedure Laterality Date   • BLADDER SURGERY      Bladder lift   • BREAST RECONSTRUCTION, BREAST TISSUE EXPANDER INSERTION Right    • BREAST SURGERY Right 2010    Mastectomy   •  SECTION  ,    • CHOLECYSTECTOMY     • COLONOSCOPY     • HERNIA REPAIR  2015    [facility]   • HYSTERECTOMY      Partial   • KNEE SURGERY Right    • LEG SURGERY Left     Broken   • MASTECTOMY Right    • WI TREAT TIBIAL SHAFT FX, INTRAMED IMPLANT Left 2017    Procedure: TIBIA INTRAMEDULLARY NAIL/DON INSERTION;  Surgeon: Romero Shanks MD;  Location: [facility];  Service: Orthopedics   • THORACIC DECOMPRESSION POSTERIOR FUSION WITH INSTRUMENTATION N/A 2017    Procedure: T6 costotransversectomy and decompression with T3 to  T9 posterior spinal fusion [facility];  Surgeon: Quan Nayak MD;  Location: [facility];  Service:        ONCOLOGIC HISTORY:  (History from previous dates can be found in the separate document.)    Current Outpatient Medications on File Prior to Visit   Medication Sig Dispense Refill   • acetaminophen (TYLENOL) 500 MG tablet Take 500 mg by mouth Every 6 (Six) Hours As Needed for Mild Pain .     • calcium carbonate (OS-CARY) 600 MG tablet Take 600 mg by mouth 2 (Two) Times a Day With Meals.     • capecitabine (XELODA) 500 MG chemo tablet Take 3 tabs (1500 mg) by mouth twice a day  for 7 days on then 7 days off. 84 tablet 3   • cholecalciferol (VITAMIN D3) 1000 units tablet Take 1,000 Units by mouth Daily.     • diazePAM (VALIUM) 10 MG tablet Take 1 tablet by mouth Every 12 (Twelve) Hours As Needed for Anxiety. 10 tablet 1   • diphenoxylate-atropine (LOMOTIL) 2.5-0.025 MG per tablet Take 1 tablet by mouth 4 (Four) Times a Day As Needed for Diarrhea. 60 tablet 0   • ELIQUIS 5 MG tablet tablet TAKE 1 TABLET BY MOUTH EVERY 12 HOURS 60 tablet 6   • FLONASE ALLERGY RELIEF 50 MCG/ACT nasal spray 2 sprays into each nostril daily.  11   • gabapentin (NEURONTIN) 300 MG capsule Take 1 capsule by mouth Daily. 10 capsule 1   • ketoconazole (NIZORAL) 2 % cream Apply  topically to the appropriate area as directed Daily. 15 g 0   • losartan (COZAAR) 50 MG tablet Take 1 tablet by mouth Daily. 90 tablet 2   • mesalamine (LIALDA) 1.2 g EC tablet TAKE 1 TABLET BY MOUTH TWICE DAILY 180 tablet 0   • metaxalone (SKELAXIN) 800 MG tablet Take 1 tablet by mouth 3 (Three) Times a Day As Needed for Muscle Spasms. 30 tablet 3   • omeprazole (PriLOSEC) 40 MG capsule TAKE 1 CAPSULE DAILY 90 capsule 2   • ondansetron ODT (ZOFRAN-ODT) 8 MG disintegrating tablet Take 1 tablet by mouth Every 8 (Eight) Hours As Needed for Nausea or Vomiting. 30 tablet 1   • phenazopyridine (PYRIDIUM) 200 MG tablet Take 200 mg by mouth 3 (Three) Times a Day As Needed for bladder spasms.     • prochlorperazine (COMPAZINE) 10 MG tablet Take 1 tablet by mouth Every 6 (Six) Hours As Needed for Nausea or Vomiting. 30 tablet 0   • sennosides-docusate sodium (SENOKOT-S) 8.6-50 MG tablet Take 2 tablets by mouth 2 (Two) Times a Day. 90 tablet 2   • temazepam (RESTORIL) 15 MG capsule Take 1 capsule by mouth At Night As Needed for Sleep. 30 capsule 1   • traMADol (ULTRAM) 50 MG tablet TAKE 1 TABLET BY MOUTH EVERY 8 HOURS AS NEEDED FOR MODERATE PAIN 90 tablet 1   • triamcinolone (KENALOG) 0.1 % cream Apply  topically to the appropriate area as directed 2  (Two) Times a Day. 15 g 0   • sucralfate (CARAFATE) 1 g tablet Take 1 tablet by mouth 4 (Four) Times a Day. 30 tablet 2     No current facility-administered medications on file prior to visit.        ALLERGIES:     Allergies   Allergen Reactions   • Hydrocodone Nausea Only   • Morphine And Related Nausea And Vomiting     Social History     Socioeconomic History   • Marital status:      Spouse name: Murray   • Number of children: 3   • Years of education: College   • Highest education level: Not on file   Occupational History     Employer: [employer]     Employer: RETIRED   Tobacco Use   • Smoking status: Never Smoker   • Smokeless tobacco: Never Used   Substance and Sexual Activity   • Alcohol use: Yes     Comment: social   • Drug use: No   • Sexual activity: Defer     Family History   Problem Relation Age of Onset   • Heart disease Mother    • Hyperlipidemia Mother    • Hypertension Mother    • Diabetes Father    • Charcot-Saloni-Tooth disease Father    • Heart disease Father    • Hypertension Father    • Heart failure Father    • Diabetes Sister    • Heart disease Sister    • Hypertension Sister    • Heart disease Maternal Aunt    • Scoliosis Maternal Aunt    • Heart disease Maternal Uncle    • Diabetes Maternal Grandmother    • Heart disease Maternal Grandmother    • Hypertension Maternal Grandmother    • Uterine cancer Maternal Grandmother    • Heart disease Maternal Grandfather      Review of Systems   Constitutional: Positive for fatigue. Negative for activity change, appetite change, fever and unexpected weight change.   HENT: Negative for congestion, mouth sores, nosebleeds, sore throat and voice change.    Eyes: Negative for discharge.   Respiratory: Negative for cough, shortness of breath and wheezing.    Cardiovascular: Negative for chest pain, palpitations and leg swelling.   Gastrointestinal: Positive for diarrhea (occasional, controlled). Negative for abdominal distention, abdominal pain, blood  "in stool, constipation, nausea and vomiting.   Endocrine: Negative for cold intolerance and heat intolerance.   Genitourinary: Negative for difficulty urinating, dysuria, flank pain, frequency, hematuria and urgency.   Musculoskeletal: Positive for arthralgias. Negative for back pain, joint swelling and myalgias.   Skin: Positive for rash (dry skin). Negative for wound.   Neurological: Negative for dizziness, syncope, weakness, light-headedness, numbness and headaches.   Hematological: Negative for adenopathy. Does not bruise/bleed easily.   Psychiatric/Behavioral: Positive for sleep disturbance (improved). Negative for confusion. The patient is not nervous/anxious.          Objective    Vitals:    04/06/20 1200   BP: 122/73   Pulse: 88   Resp: 16   Temp: 98 °F (36.7 °C)   SpO2: 99%   Weight: 89.2 kg (196 lb 11.2 oz)   Height: 154.9 cm (60.98\")   PainSc: 0-No pain     Current Status 4/6/2020   ECOG score 0       Physical Exam   Constitutional: She is oriented to person, place, and time. She appears well-developed and well-nourished.   Eyes: Pupils are equal, round, and reactive to light. Conjunctivae are normal.   Pulmonary/Chest: Effort normal. No respiratory distress.   Musculoskeletal: She exhibits no edema.   Lower extremity braces in place.  No significant lower extremity edema   Neurological: She is alert and oriented to person, place, and time. No cranial nerve deficit.   Bilateral lower extremity strength, 4+/5   Skin: Skin is warm and dry. There is erythema.   Mild erythema involving the palms of the hands with slight peeling of some finger-tips. No open skin of concern.    Psychiatric: She has a normal mood and affect. Her behavior is normal.       RECENT LABS:  Results from last 7 days   Lab Units 04/06/20  1152   WBC 10*3/mm3 4.04   NEUTROS ABS 10*3/mm3 1.92   HEMOGLOBIN g/dL 12.0   HEMATOCRIT % 36.0   PLATELETS 10*3/mm3 252     Results from last 7 days   Lab Units 04/06/20  1152   SODIUM mmol/L 142 "   POTASSIUM mmol/L 4.9   CHLORIDE mmol/L 102   CO2 mmol/L 30.9*   BUN mg/dL 22*   CREATININE mg/dL 0.94   CALCIUM mg/dL 9.3   ALBUMIN g/dL 4.10   BILIRUBIN mg/dL 0.4   ALK PHOS U/L 70   ALT (SGPT) U/L 23   AST (SGOT) U/L 22   GLUCOSE mg/dL 115*   MAGNESIUM mg/dL 2.4             Reviewed CT scans and bone scan from 3/3/2020 as outlined below.  I did personally review CT images today.      Assessment/Plan      1. Previous Stage Ib ([id number]) right breast cancer:  · Diagnosed May 2010 with excisional biopsy for invasive ductal carcinoma, 1.3 cm, grade 2, ER 90%, IA 80%, HER-2/peter negative (1+ IHC).    · Subsequent right mastectomy in July 2010 with no residual breast malignancy, 1/5 sentinel lymph node with micrometastasis (0.25 mm).    · Treated in the [facility] system with adjuvant AC ×4 cycles in 2010 (no taxanes administered due to underlying Charcot-Saloni-Tooth with peripheral neuropathy).    · Adjuvant Femara (postmenopausal) initiated October 2010 with plan to treat ×10 years.    · Genetic testing reportedly negative.    · Developed osteopenia treated with Prolia beginning 2/27/13. Subsequently discontinued due to identification of metastatic disease.  2. Recurrent/metastatic disease identified 10/8/17:  · Disease involving thoracic spine with cord compression at T6, lumbosacral involvement, sternal and right sternoclavicular involvement.    · Femara discontinued in 10/2017.    · Radiation administered (in the [facility] system) to the thoracic spine beginning 10/19/17 treating T3-T9 to a dose of 24 gray in 6 fractions.  · Evaluation with MRI 12/8/17 showing persistent T6 cord compression with persistent neurologic compromise requiring surgical treatment 12/11/17 with T6 laminectomy/corpectomy and T3-T9 fusion.  Pathology with metastatic carcinoma of breast origin, ER negative, IA negative, HER-2/peter negative (1+ IHC).  · Additional staging evaluation 12/8/17 with no evidence of visceral metastatic disease, bone  scan showing involvement of thoracic spine, sternum, left humerus, mid frontal bone.  No plane film correlate of left humerus lesion.  MRI lumbar spine with small intradural L3 metastasis.  CA 15-3 12/6/17- 17.  · Palliative radiation therapy to L3 dural metastasis and left humerus initiated 1/15/18 (10 fractions), completed 1/26/18.  · Hypercalcemia of malignancy with calcium in the 10-11 range.  · Initiation of monthly Xgeva 1/23/18.  · Baseline CT scan 1/30/18 with no evidence of visceral involvement.  Cluster of nodular opacities in the right lower lobe suspected to be infectious or related to bronchiolitis. Bone scan 1/30/18 showed postsurgical change in the thoracic spine, stable uptake in the frontal bone, no new areas of disease.  · Initiation of palliative oral single agent Xeloda 2/7/18 2000 mg a.m., 1500 mg p.m. for 14/21 days.   · Following 3 cycles xeloda, bone scan 4/4/18 showed no change from the prior study.  CT scan 4/4/18 showed a small pericardial effusion of unclear significance as well as a subcutaneous nodule in the right lateral chest wall.  Subsequent evaluation with echocardiogram 4/17/18 showed no evidence of pericardial effusion.  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  We discussed the possibility of surgical excision of the right subcutaneous chest wall lesion for more definitive diagnosis.  Reviewed previous CT images dating back to 12/8/17 and the lesion was present even at that time measuring around 1.7 cm although not commented on in the radiology report.  As this appears to be an indolent low-grade process unrelated to her breast cancer, recommendee foregoing surgical excision at this time and monitoring this area on future scans.  The patient agreed.    · Following 6 cycles of Xeloda, CT 6/6/18  showed stable findings, no evidence of  progressive disease.  There was a comment regarding subcutaneous abnormality in the anterior abdominal wall and this was related to Lovenox injection sites.  Bone scan 6/6/18 showed no interval change.   · CT scan and bone scan 8/13/18 following 9 cycles of Xeloda showed stable findings with no evidence of significant visceral metastases.  Her bone lesions appear stable on bone scan.  The spindle cell neoplasm in her right chest wall actually decreased in size from 2 cm down to 1.6 cm.    · The patient experienced some symptoms of diarrhea, anorexia, generalized weakness during cycle 9 Xeloda it was unclear whether this was related to a viral gastroenteritis or toxicity from treatment.  Symptoms recurred during cycle 10 and treatment was cut short by 2 days.  Symptoms attributed to Xeloda.  With cycle 11, dose and schedule altered to 1500 mg twice daily for 7 days on followed by 7 days off .  · Most recent 3-month interval scans with bone scan 3/3/2020 showing no changes.  CT scan 3/3/2020 with no significant changes..    · The patient returns today continuing on treatment with Xeloda 1500 mg twice daily 7 days on followed by 7 days off.  She is tolerating treatment reasonably well.  She is due for monthly Xgeva today.    Hand-foot symptoms remain overall fairly stable though she is having some peeling of her fingertips in light of washing her hands more recently because of the COVID pandemic.  She will monitor and call us should this worsen and not remained stable/tolerable.  She will return in 1 month for Xgeva and then in 7 weeks undergo repeat scans and see Dr. Hancock back in 8 weeks for review of scans.  3. Right pulmonary embolism:  · Diagnosed on CT angiogram 10/21/17 involving small right lower lobe pulmonary artery.  Lower extremity Dopplers negative.  · Bilateral lower extremity Dopplers negative again 12/5/17.  · Received chronic Lovenox 1 mg/kg twice per day, transition to oral Eliquis in February 2019,  continuing on Eliquis 5 mg twice daily.  4. Cancer related pain:  · Previously receiving Duragesic 50 µg patch every 72 hours along with Dilaudid 4 mg as needed for breakthrough pain  · The patient's pain improved over time and she was able to discontinue both Duragesic and Dilaudid in the interval.  · Patient does take occasional Flexeril at bedtime due to back spasm/pain when she has been more active.  · The patient does have some occasional aggravation of her chronic back pain with significant rehabilitation activity and requires an occasional dose of Dilaudid.  No narcotic requirements recently.  5. Chemotherapy-induced diarrhea with subsequent C. difficile colitis in the setting of previous ulcerative colitis:  · Patient with reported history of ulcerative colitis, is not on active therapy.  · The patient developed initial diarrhea related to Xeloda at regular dosing.  · Symptoms improved on reduced dose Xeloda  · Flare of symptoms in October 2018 with apparent finding of C. difficile colitis by GI, treated with course of oral vancomycin with improvement in symptoms.  · Patient notes minimal intermittent diarrhea on Xeloda requiring occasional dosing of Imodium.  6. Traumatic left tibia/fibular fracture:  · Status post ORIF 12/6/17  · Specimen was sent for pathologic review, negative for evidence of malignancy  7. Hypercalcemia:  · Suspect hypercalcemia of malignancy, calcium in  10-11 range previously.  · Calcium normalized following initiation of monthly Xgeva on 1/23/18.  8. Chemotherapy-induced mucositis:  · Patient had a minimal degree of mucositis with cycle 2.  The patient has magic mouthwash to use as needed.  No subsequent mucositis.  9. Recurrent UTI, bladder wall thickening on CT:  · Patient had an enterococcal UTI on 3/2/18 sensitive to nitrofurantoin and received treatment, unclear how long.  · Recurrent UTI 3/20/18 with urine culture growing Klebsiella, initially treated with nitrofurantoin,  transitioned to Levaquin.  · CT 4/4/18 with diffuse bladder wall thickening with increased nodular thickening at the left base.  Referral to urogynecology Dr. May Johnson.  She was placed on a prophylactic dose of trimethoprim 100 mg daily, bladder wall thickening felt to be related to recent recurrent urinary tract infections.  · Patient with urinary symptoms, treated with course of Macrobid at the end of December 2018, urine culture however was negative 12/31/18.  · Patient was found to have Klebsiella UTI 7/29/2019 which was successfully treated with Macrobid with complete resolution of symptoms.  10.   Mobility:  · The patient underwent an intensive course of rehabilitation at [facility].  She graduated from her outpatient course November 2018.  · Overall the patient has improved dramatically in terms of mobility, now walking around 1 mile per day without assistance.  11.  Depression:  · The patient weaned herself off of Cymbalta and reports that her symptoms remain stable.  12. 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  Symptoms did improve and she continued on the same dose.    · Symptoms in the hands are fairly stable with mild skin cracking and peeling of fingertips.  Patient will continue to use emollient cream frequently and encouraged her to call if skin should worsen.  13. Elevated liver function studies:  · Liver function studies increased 8/20/19 with ALT 98, AST 70, normal total bilirubin.  · Negative viral hepatitis A, B, and C panel 8/23/18  · Likely related to hepatic steatosis seen on CT, no definitive evidence of metastatic liver lesions.   · Subsequent improvement in LFTs  · Today, LFTs are normal  14. Chemotherapy induced leukopenia:  · WBC today 4.04 with normal differential  15. GERD:  · The patient was experiencing  increased symptoms of reflux when seen here last month and we did prescribe again Carafate however patient did not tolerate the medication well.  She did contact her GI physician and has been taking omeprazole 40 mg daily (2 x 20 mg) with improved symptoms.  She is avoiding eating at night which does help as well.  16. Insomnia:  · Patient with prior paradoxical reaction to Benadryl  · Improved previously on temazepam 15 mg nightly as needed.   · Patient now utilizing gabapentin 600 mg nightly (prescribed by PCP, Dr. Jazmine Chanel). Also using temazepam and requesting refill.  17. Health maintenance:  · Patient notes that she has a history of colon polyps as well as ulcerative colitis and was due for a follow-up colonoscopy on 9/12/2019.  We did discuss there is no necessity to pursue colonoscopy in the setting of her metastatic breast cancer.    · The patient did undergo colonoscopy on 2/7/2020 with findings of muscular hypertrophy and diverticulosis.  Colonic biopsies taken with pathologic results pending.  18. Right shoulder pain:  · Patient was evaluated by Dr Forrester.  She has experienced difficulty over the past year and a half with her right shoulder although she has not complained of this in prior visits to our office.  She reports difficulty with abduction.    · The patient did undergo MRI of her right shoulder on 11/21/2019 at an outside facility showing multiple abnormalities including supraspinatus tendinosis, labral tear but no evidence of metastatic disease.  · Symptoms currently improved/resolved.    Plan:  1. Continue oral Xeloda 1500 mg twice a day 7 days on followed by 7 days off.  2. Monthly Xgeva today  3. Continue Eliquis 5 mg twice daily.  4. Continue use of emollient cream on the hands and feet, call with worsening peeling/cracking of skin despite.    5. Continue omeprazole 40 mg daily.    6. Continue temazepam 15 mg nightly. Refill Rx sent today.  7. In 4 weeks monthly Xgeva  8. In 7 weeks CT  chest abdomen pelvis and bone scan  9. In 8 weeks MD visit with CBC, CMP, magnesium, phosphorus and monthly Xgeva.    Patient continuing on high risk medication requiring intensive monitoring.

## 2020-04-09 ENCOUNTER — SPECIALTY PHARMACY (OUTPATIENT)
Dept: PHARMACY | Facility: HOSPITAL | Age: 60
End: 2020-04-09

## 2020-04-09 ENCOUNTER — MEDICATION THERAPY MANAGEMENT (OUTPATIENT)
Dept: PHARMACY | Facility: HOSPITAL | Age: 60
End: 2020-04-09

## 2020-04-09 RX ORDER — CAPECITABINE 500 MG/1
TABLET, FILM COATED ORAL
Qty: 84 TABLET | Refills: 3 | Status: SHIPPED | OUTPATIENT
Start: 2020-04-09 | End: 2020-07-24 | Stop reason: SDUPTHER

## 2020-04-09 NOTE — TELEPHONE ENCOUNTER
I was notified by Jenny, Pharmacist at [facility], that pt was out of refill on her Capecitabine. Per last office note-pt is to continue. I have escribed a new rx to [facility].

## 2020-04-09 NOTE — PROGRESS NOTES
MTM telephone encounter re adherence and side effects ( Capecitabine)    Pt states she had some redness of her hands and feet along with some skin dryness and peeling due to handwashing during COVID 19.  We discussed trying Aquaphor or Urea 2-3 times daily.  She denies mouth sores and states she had diarrhea twice recently and used Imodium successfully for alleviation.  She denies nausea or fluid retention.  She is practicing social distancing and denies cough or fever.

## 2020-04-10 RX ORDER — MESALAMINE 1.2 G/1
TABLET, DELAYED RELEASE ORAL
Qty: 180 TABLET | Refills: 0 | Status: SHIPPED | OUTPATIENT
Start: 2020-04-10 | End: 2020-11-04 | Stop reason: SDUPTHER

## 2020-05-05 ENCOUNTER — SPECIALTY PHARMACY (OUTPATIENT)
Dept: PHARMACY | Facility: HOSPITAL | Age: 60
End: 2020-05-05

## 2020-05-11 ENCOUNTER — INFUSION (OUTPATIENT)
Dept: ONCOLOGY | Facility: HOSPITAL | Age: 60
End: 2020-05-11

## 2020-05-11 ENCOUNTER — SPECIALTY PHARMACY (OUTPATIENT)
Dept: ONCOLOGY | Facility: CLINIC | Age: 60
End: 2020-05-11

## 2020-05-11 ENCOUNTER — OFFICE VISIT (OUTPATIENT)
Dept: ONCOLOGY | Facility: CLINIC | Age: 60
End: 2020-05-11

## 2020-05-11 ENCOUNTER — LAB (OUTPATIENT)
Dept: OTHER | Facility: HOSPITAL | Age: 60
End: 2020-05-11

## 2020-05-11 VITALS
BODY MASS INDEX: 37.57 KG/M2 | SYSTOLIC BLOOD PRESSURE: 123 MMHG | OXYGEN SATURATION: 97 % | HEIGHT: 61 IN | TEMPERATURE: 98 F | HEART RATE: 94 BPM | RESPIRATION RATE: 18 BRPM | DIASTOLIC BLOOD PRESSURE: 68 MMHG | WEIGHT: 199 LBS

## 2020-05-11 VITALS
HEART RATE: 89 BPM | WEIGHT: 199.4 LBS | HEIGHT: 61 IN | BODY MASS INDEX: 37.65 KG/M2 | RESPIRATION RATE: 16 BRPM | SYSTOLIC BLOOD PRESSURE: 122 MMHG | OXYGEN SATURATION: 97 % | DIASTOLIC BLOOD PRESSURE: 78 MMHG | TEMPERATURE: 97.9 F

## 2020-05-11 DIAGNOSIS — C50.811 MALIGNANT NEOPLASM OF OVERLAPPING SITES OF RIGHT BREAST IN FEMALE, ESTROGEN RECEPTOR NEGATIVE (HCC): Primary | ICD-10-CM

## 2020-05-11 DIAGNOSIS — Z17.1 MALIGNANT NEOPLASM OF OVERLAPPING SITES OF RIGHT BREAST IN FEMALE, ESTROGEN RECEPTOR NEGATIVE (HCC): Primary | ICD-10-CM

## 2020-05-11 DIAGNOSIS — C50.811 MALIGNANT NEOPLASM OF OVERLAPPING SITES OF RIGHT BREAST IN FEMALE, ESTROGEN RECEPTOR NEGATIVE (HCC): ICD-10-CM

## 2020-05-11 DIAGNOSIS — Z17.1 MALIGNANT NEOPLASM OF OVERLAPPING SITES OF RIGHT BREAST IN FEMALE, ESTROGEN RECEPTOR NEGATIVE (HCC): ICD-10-CM

## 2020-05-11 LAB
ALBUMIN SERPL-MCNC: 4.1 G/DL (ref 3.5–5.2)
ALBUMIN/GLOB SERPL: 1.7 G/DL
ALP SERPL-CCNC: 68 U/L (ref 39–117)
ALT SERPL W P-5'-P-CCNC: 27 U/L (ref 1–33)
ANION GAP SERPL CALCULATED.3IONS-SCNC: 10.1 MMOL/L (ref 5–15)
AST SERPL-CCNC: 28 U/L (ref 1–32)
BASOPHILS # BLD AUTO: 0.06 10*3/MM3 (ref 0–0.2)
BASOPHILS NFR BLD AUTO: 1.7 % (ref 0–1.5)
BILIRUB SERPL-MCNC: 0.7 MG/DL (ref 0.1–1.2)
BUN BLD-MCNC: 28 MG/DL (ref 6–20)
BUN/CREAT SERPL: 29.8 (ref 7–25)
CALCIUM SPEC-SCNC: 9 MG/DL (ref 8.6–10.5)
CHLORIDE SERPL-SCNC: 103 MMOL/L (ref 98–107)
CO2 SERPL-SCNC: 29.9 MMOL/L (ref 22–29)
CREAT BLD-MCNC: 0.94 MG/DL (ref 0.57–1)
DEPRECATED RDW RBC AUTO: 67.9 FL (ref 37–54)
EOSINOPHIL # BLD AUTO: 0.12 10*3/MM3 (ref 0–0.4)
EOSINOPHIL NFR BLD AUTO: 3.4 % (ref 0.3–6.2)
ERYTHROCYTE [DISTWIDTH] IN BLOOD BY AUTOMATED COUNT: 17.3 % (ref 12.3–15.4)
GFR SERPL CREATININE-BSD FRML MDRD: 61 ML/MIN/1.73
GLOBULIN UR ELPH-MCNC: 2.4 GM/DL
GLUCOSE BLD-MCNC: 100 MG/DL (ref 65–99)
HCT VFR BLD AUTO: 36.6 % (ref 34–46.6)
HGB BLD-MCNC: 12 G/DL (ref 12–15.9)
IMM GRANULOCYTES # BLD AUTO: 0 10*3/MM3 (ref 0–0.05)
IMM GRANULOCYTES NFR BLD AUTO: 0 % (ref 0–0.5)
LYMPHOCYTES # BLD AUTO: 1.04 10*3/MM3 (ref 0.7–3.1)
LYMPHOCYTES NFR BLD AUTO: 29.6 % (ref 19.6–45.3)
MACROCYTES BLD QL SMEAR: NORMAL
MAGNESIUM SERPL-MCNC: 2 MG/DL (ref 1.6–2.6)
MCH RBC QN AUTO: 34.7 PG (ref 26.6–33)
MCHC RBC AUTO-ENTMCNC: 32.8 G/DL (ref 31.5–35.7)
MCV RBC AUTO: 105.8 FL (ref 79–97)
MONOCYTES # BLD AUTO: 0.45 10*3/MM3 (ref 0.1–0.9)
MONOCYTES NFR BLD AUTO: 12.8 % (ref 5–12)
NEUTROPHILS # BLD AUTO: 1.84 10*3/MM3 (ref 1.7–7)
NEUTROPHILS NFR BLD AUTO: 52.5 % (ref 42.7–76)
NRBC BLD AUTO-RTO: 0 /100 WBC (ref 0–0.2)
PHOSPHATE SERPL-MCNC: 3 MG/DL (ref 2.5–4.5)
PLAT MORPH BLD: NORMAL
PLATELET # BLD AUTO: 195 10*3/MM3 (ref 140–450)
PMV BLD AUTO: 10.4 FL (ref 6–12)
POTASSIUM BLD-SCNC: 4.9 MMOL/L (ref 3.5–5.2)
PROT SERPL-MCNC: 6.5 G/DL (ref 6–8.5)
RBC # BLD AUTO: 3.46 10*6/MM3 (ref 3.77–5.28)
SODIUM BLD-SCNC: 143 MMOL/L (ref 136–145)
WBC MORPH BLD: NORMAL
WBC NRBC COR # BLD: 3.51 10*3/MM3 (ref 3.4–10.8)

## 2020-05-11 PROCEDURE — 99214 OFFICE O/P EST MOD 30 MIN: CPT | Performed by: INTERNAL MEDICINE

## 2020-05-11 PROCEDURE — 96372 THER/PROPH/DIAG INJ SC/IM: CPT

## 2020-05-11 PROCEDURE — 25010000002 DENOSUMAB 120 MG/1.7ML SOLUTION: Performed by: INTERNAL MEDICINE

## 2020-05-11 PROCEDURE — 80053 COMPREHEN METABOLIC PANEL: CPT | Performed by: INTERNAL MEDICINE

## 2020-05-11 PROCEDURE — 84100 ASSAY OF PHOSPHORUS: CPT | Performed by: INTERNAL MEDICINE

## 2020-05-11 PROCEDURE — 85025 COMPLETE CBC W/AUTO DIFF WBC: CPT | Performed by: INTERNAL MEDICINE

## 2020-05-11 PROCEDURE — 83735 ASSAY OF MAGNESIUM: CPT | Performed by: INTERNAL MEDICINE

## 2020-05-11 PROCEDURE — 36415 COLL VENOUS BLD VENIPUNCTURE: CPT

## 2020-05-11 PROCEDURE — 85007 BL SMEAR W/DIFF WBC COUNT: CPT | Performed by: INTERNAL MEDICINE

## 2020-05-11 RX ORDER — TRIMETHOPRIM 100 MG/1
100 TABLET ORAL DAILY
COMMUNITY
Start: 2020-04-28 | End: 2021-10-18

## 2020-05-11 RX ADMIN — DENOSUMAB 120 MG: 120 INJECTION SUBCUTANEOUS at 13:49

## 2020-05-11 NOTE — PROGRESS NOTES
Subjective .     REASONS FOR FOLLOWUP:    1. Stage Ib ([id number]) right breast cancer: Diagnosed May 2010 with excisional biopsy for invasive ductal carcinoma, 1.3 cm, grade 2, ER 90%, GA 80%, HER-2/peter negative (1+ IHC).  Subsequent right mastectomy in July 2010 with no residual breast malignancy, 1/5 sentinel lymph node with micrometastasis (0.25 mm).  Treated in the [facility] system with adjuvant AC ×4 cycles in 2010 (no taxanes administered due to underlying Charcot-Saloni-Tooth with peripheral neuropathy).  Adjuvant Femara (postmenopausal) initiated October 2010 with plan to treat ×10 years.  Genetic testing reportedly negative.  Developed osteopenia treated with Prolia beginning 2/27/13.  2. Recurrent/metastatic disease identified 10/8/17 involving thoracic spine with cord compression at T6, lumbosacral involvement, sternal and right sternoclavicular involvement.  Femara discontinued.  Radiation administered (in the [facility] system) to the thoracic spine beginning 10/19/17 treating T3-T9 to a dose of 24 gray in 6 fractions.  3. Evaluation with MRI 12/8/17 showing persistent T6 cord compression with persistent neurologic compromise requiring surgical treatment 12/11/17 with T6 laminectomy/corpectomy and T3-T9 fusion.  Pathology with metastatic carcinoma of breast origin, ER negative, GA negative, HER-2/peter negative (1+ IHC).  4. Right pulmonary embolism 10/21/17 treated with Lovenox 1 mg/kg (100 mg) every 12 hours.  No evidence of DVT.  Lovenox held due to right gluteus hematoma 3/23/18 through 4/6/18.  Transitioned to oral Eliquis 5mg bid 1/28/19 (as of 2/25/19, patient still using previous supply of Lovenox).  5. Cancer related pain previously on Duragesic 50 µg patch every 72 hours and Dilaudid 4 mg as needed for breakthrough pain.  Narcotics subsequently discontinued with improvement in pain in January 2018.  6. Radiation therapy to L3 dural metastasis and left humerus initiated 1/15/18 (10 fractions),  completed 1/26/18  7. Monthly Xgeva initiated 1/23/18  8. Mild hypercalcemia of malignancy  9. Initiation of palliative oral single agent Xeloda 2/7/18 (2000 mg a.m., 1500 mg p.m. for 14/21 days).  10. 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  In reviewing prior CT scans, has been present for some time.  Monitor for now, if it enlarges consider surgical excision.  11. Cumulative side effects from Xeloda with fatigue, anorexia, diarrhea, increased tearing.  Alteration in dose/schedule with cycle 11 to 1500 mg twice a day for 7 days on followed by 7 days off.    HISTORY OF PRESENT ILLNESS:  The patient is a 59 y.o. year old female who is here for follow-up with the above-mentioned history.  The patient continues on Xeloda    History of Present Illness the patient returns today in follow-up continuing on oral [id number] mg twice daily 7 days on followed by 7 days off and also continuing on monthly Xgeva that is due today with laboratory studies to review.  The patient continues to do very well on treatment.  She has mild hand-foot symptoms which are stable with minimal skin cracking in the hands.  She continues to use emollient cream frequently and does wear gloves at night intermittently.  She does note recently using a new CBD cream which has helped further.  She has some occasional loose stools for which she takes Imodium intermittently reports that this has improved recently.  She continues on gabapentin 600 mg nightly and reports that this has been effective in treating her insomnia.  She continues on anticoagulation with Eliquis and denies any bleeding issues.  She has only minimal fatigue.  She continues to be very active, ambulates relatively well with only the assistance from a cane.  She does note some recent change in her visual  acuity.    Past Medical History:   Diagnosis Date   • Acute pulmonary embolism, cancer-related 10/2017   • Allergic rhinitis    • Arthritis     Right knee; left shoulder   • Cancer (CMS/[facility]) 2010    Right breast   • Cancer (CMS/[facility]) 10/2017    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   • Charcot-Saloni-Tooth disease    • CPAP (continuous positive airway pressure) dependence    • GERD (gastroesophageal reflux disease)    • H/O Colon polyps    • H/O Uterine fibroid    • Heart murmur    • Hyperlipidemia    • Hypertension    • PONV (postoperative nausea and vomiting)      Past Surgical History:   Procedure Laterality Date   • BLADDER SURGERY      Bladder lift   • BREAST RECONSTRUCTION, BREAST TISSUE EXPANDER INSERTION Right    • BREAST SURGERY Right 2010    Mastectomy   •  SECTION  ,    • CHOLECYSTECTOMY     • COLONOSCOPY     • HERNIA REPAIR  2015    [facility]   • HYSTERECTOMY      Partial   • KNEE SURGERY Right    • LEG SURGERY Left     Broken   • MASTECTOMY Right    • AR TREAT TIBIAL SHAFT FX, INTRAMED IMPLANT Left 2017    Procedure: TIBIA INTRAMEDULLARY NAIL/DON INSERTION;  Surgeon: Romero Shanks MD;  Location: [facility];  Service: Orthopedics   • THORACIC DECOMPRESSION POSTERIOR FUSION WITH INSTRUMENTATION N/A 2017    Procedure: T6 costotransversectomy and decompression with T3 to  T9 posterior spinal fusion with instrumentation with Jennifer Gonzalez and Nael Dennis [facility];  Surgeon: Quan Nayak MD;  Location: [facility];  Service:        ONCOLOGIC HISTORY:  (History from previous dates can be found in the separate document.)    Current Outpatient Medications on File Prior to Visit   Medication Sig Dispense Refill   • acetaminophen (TYLENOL) 500 MG tablet Take 500 mg by mouth Every 6 (Six) Hours As Needed for Mild Pain .     • calcium carbonate (OS-CARY) 600 MG tablet Take 600 mg by mouth 2 (Two) Times a Day With Meals.     • capecitabine (XELODA) 500 MG chemo  tablet Take 3 tabs (1500 mg) by mouth twice a day for 7 days on then 7 days off. 84 tablet 3   • cholecalciferol (VITAMIN D3) 1000 units tablet Take 1,000 Units by mouth Daily.     • diazePAM (VALIUM) 10 MG tablet Take 1 tablet by mouth Every 12 (Twelve) Hours As Needed for Anxiety. 10 tablet 1   • diphenoxylate-atropine (LOMOTIL) 2.5-0.025 MG per tablet Take 1 tablet by mouth 4 (Four) Times a Day As Needed for Diarrhea. 60 tablet 0   • ELIQUIS 5 MG tablet tablet TAKE 1 TABLET BY MOUTH EVERY 12 HOURS 60 tablet 6   • FLONASE ALLERGY RELIEF 50 MCG/ACT nasal spray 2 sprays into each nostril daily.  11   • gabapentin (NEURONTIN) 300 MG capsule Take 1 capsule by mouth Daily. 10 capsule 1   • ketoconazole (NIZORAL) 2 % cream Apply  topically to the appropriate area as directed Daily. 15 g 0   • losartan (COZAAR) 50 MG tablet Take 1 tablet by mouth Daily. 90 tablet 2   • mesalamine (LIALDA) 1.2 g EC tablet TAKE 1 TABLET BY MOUTH TWICE DAILY 180 tablet 0   • metaxalone (SKELAXIN) 800 MG tablet Take 1 tablet by mouth 3 (Three) Times a Day As Needed for Muscle Spasms. 30 tablet 3   • omeprazole (PriLOSEC) 40 MG capsule TAKE 1 CAPSULE DAILY 90 capsule 2   • ondansetron ODT (ZOFRAN-ODT) 8 MG disintegrating tablet Take 1 tablet by mouth Every 8 (Eight) Hours As Needed for Nausea or Vomiting. 30 tablet 1   • phenazopyridine (PYRIDIUM) 200 MG tablet Take 200 mg by mouth 3 (Three) Times a Day As Needed for bladder spasms.     • prochlorperazine (COMPAZINE) 10 MG tablet Take 1 tablet by mouth Every 6 (Six) Hours As Needed for Nausea or Vomiting. 30 tablet 0   • sennosides-docusate sodium (SENOKOT-S) 8.6-50 MG tablet Take 2 tablets by mouth 2 (Two) Times a Day. 90 tablet 2   • temazepam (RESTORIL) 15 MG capsule Take 1 capsule by mouth At Night As Needed for Sleep. 30 capsule 1   • traMADol (ULTRAM) 50 MG tablet TAKE 1 TABLET BY MOUTH EVERY 8 HOURS AS NEEDED FOR MODERATE PAIN 90 tablet 1   • triamcinolone (KENALOG) 0.1 % cream Apply   topically to the appropriate area as directed 2 (Two) Times a Day. 15 g 0   • trimethoprim (TRIMPEX) 100 MG tablet Take 100 mg by mouth Daily.     • sucralfate (CARAFATE) 1 g tablet Take 1 tablet by mouth 4 (Four) Times a Day. 30 tablet 2     No current facility-administered medications on file prior to visit.        ALLERGIES:     Allergies   Allergen Reactions   • Hydrocodone Nausea Only   • Morphine And Related Nausea And Vomiting     Social History     Socioeconomic History   • Marital status:      Spouse name: Murray   • Number of children: 3   • Years of education: College   • Highest education level: Not on file   Occupational History     Employer: [employer]     Employer: RETIRED   Tobacco Use   • Smoking status: Never Smoker   • Smokeless tobacco: Never Used   Substance and Sexual Activity   • Alcohol use: Yes     Comment: social   • Drug use: No   • Sexual activity: Defer     Family History   Problem Relation Age of Onset   • Heart disease Mother    • Hyperlipidemia Mother    • Hypertension Mother    • Diabetes Father    • Charcot-Saloni-Tooth disease Father    • Heart disease Father    • Hypertension Father    • Heart failure Father    • Diabetes Sister    • Heart disease Sister    • Hypertension Sister    • Heart disease Maternal Aunt    • Scoliosis Maternal Aunt    • Heart disease Maternal Uncle    • Diabetes Maternal Grandmother    • Heart disease Maternal Grandmother    • Hypertension Maternal Grandmother    • Uterine cancer Maternal Grandmother    • Heart disease Maternal Grandfather      Review of Systems   Constitutional: Positive for fatigue. Negative for activity change, appetite change, fever and unexpected weight change.   HENT: Negative for congestion, mouth sores, nosebleeds, sore throat and voice change.    Eyes: Negative for discharge.   Respiratory: Negative for cough, shortness of breath and wheezing.    Cardiovascular: Negative for chest pain, palpitations and leg swelling.    Gastrointestinal: Positive for diarrhea. Negative for abdominal distention, abdominal pain, blood in stool, constipation, nausea and vomiting.   Endocrine: Negative for cold intolerance and heat intolerance.   Genitourinary: Negative for difficulty urinating, dysuria, flank pain, frequency, hematuria and urgency.   Musculoskeletal: Positive for arthralgias. Negative for back pain, joint swelling and myalgias.   Skin: Positive for rash. Negative for wound.   Neurological: Negative for dizziness, syncope, weakness, light-headedness, numbness and headaches.   Hematological: Negative for adenopathy. Does not bruise/bleed easily.   Psychiatric/Behavioral: Positive for sleep disturbance. Negative for confusion. The patient is not nervous/anxious.          Objective      Vitals:    05/11/20 1233   BP: 122/78   Pulse: 89   Resp: 16   Temp: 97.9 °F (36.6 °C)   SpO2: 97%      Current Status 5/11/2020   ECOG score 0   Pain 3/10    Physical Exam   Constitutional: She is oriented to person, place, and time. She appears well-developed and well-nourished.   HENT:   Mouth/Throat: Oropharynx is clear and moist.   Eyes: Conjunctivae are normal.   Neck: No thyromegaly present.   Cardiovascular: Normal rate and regular rhythm. Exam reveals no gallop and no friction rub.   No murmur heard.  Pulmonary/Chest: Breath sounds normal. No respiratory distress.   Abdominal: Soft. Bowel sounds are normal. She exhibits no distension. There is no tenderness.   Musculoskeletal: She exhibits no edema.   Lower extremity braces in place.  No significant lower extremity edema   Lymphadenopathy:        Head (right side): No submandibular adenopathy present.     She has no cervical adenopathy.     She has no axillary adenopathy.        Right: No inguinal and no supraclavicular adenopathy present.        Left: No inguinal and no supraclavicular adenopathy present.   Neurological: She is alert and oriented to person, place, and time. She displays normal  reflexes. No cranial nerve deficit. She exhibits normal muscle tone.   Bilateral lower extremity strength, 4+/5   Skin: Skin is warm and dry. Rash noted.   Mild erythema involving the palms of the hands.  Slight skin cracking in the hands.    Psychiatric: She has a normal mood and affect. Her behavior is normal.   The patient was examined today, unchanged from above.    RECENT LABS:  Hematology WBC   Date Value Ref Range Status   05/11/2020 3.51 3.40 - 10.80 10*3/mm3 Final   11/04/2019 4.09 3.40 - 10.80 10*3/mm3 Final     RBC   Date Value Ref Range Status   05/11/2020 3.46 (L) 3.77 - 5.28 10*6/mm3 Final   11/04/2019 4.26 3.77 - 5.28 10*6/mm3 Final     Hemoglobin   Date Value Ref Range Status   05/11/2020 12.0 12.0 - 15.9 g/dL Final     Hematocrit   Date Value Ref Range Status   05/11/2020 36.6 34.0 - 46.6 % Final     Platelets   Date Value Ref Range Status   05/11/2020 195 140 - 450 10*3/mm3 Final        Lab Results   Component Value Date    GLUCOSE 100 (H) 05/11/2020    BUN 28 (H) 05/11/2020    CREATININE 0.94 05/11/2020    EGFRIFNONA 61 05/11/2020    EGFRIFAFRI 66 11/04/2019    BCR 29.8 (H) 05/11/2020    K 4.9 05/11/2020    CO2 29.9 (H) 05/11/2020    CALCIUM 9.0 05/11/2020    PROTENTOTREF 6.3 11/04/2019    ALBUMIN 4.10 05/11/2020    LABIL2 2.3 11/04/2019    AST 28 05/11/2020    ALT 27 05/11/2020         Assessment/Plan      1. Previous Stage Ib ([id number]) right breast cancer:  · Diagnosed May 2010 with excisional biopsy for invasive ductal carcinoma, 1.3 cm, grade 2, ER 90%, IA 80%, HER-2/peter negative (1+ IHC).    · Subsequent right mastectomy in July 2010 with no residual breast malignancy, 1/5 sentinel lymph node with micrometastasis (0.25 mm).    · Treated in the [facility] system with adjuvant AC ×4 cycles in 2010 (no taxanes administered due to underlying Charcot-Saloni-Tooth with peripheral neuropathy).    · Adjuvant Femara (postmenopausal) initiated October 2010 with plan to treat ×10 years.    · Genetic  testing reportedly negative.    · Developed osteopenia treated with Prolia beginning 2/27/13. Subsequently discontinued due to identification of metastatic disease.  2. Recurrent/metastatic disease identified 10/8/17:  · Disease involving thoracic spine with cord compression at T6, lumbosacral involvement, sternal and right sternoclavicular involvement.    · Femara discontinued in 10/2017.    · Radiation administered (in the [facility] system) to the thoracic spine beginning 10/19/17 treating T3-T9 to a dose of 24 gray in 6 fractions.  · Evaluation with MRI 12/8/17 showing persistent T6 cord compression with persistent neurologic compromise requiring surgical treatment 12/11/17 with T6 laminectomy/corpectomy and T3-T9 fusion.  Pathology with metastatic carcinoma of breast origin, ER negative, MI negative, HER-2/peter negative (1+ IHC).  · Additional staging evaluation 12/8/17 with no evidence of visceral metastatic disease, bone scan showing involvement of thoracic spine, sternum, left humerus, mid frontal bone.  No plane film correlate of left humerus lesion.  MRI lumbar spine with small intradural L3 metastasis.  CA 15-3 12/6/17- 17.  · Palliative radiation therapy to L3 dural metastasis and left humerus initiated 1/15/18 (10 fractions), completed 1/26/18.  · Hypercalcemia of malignancy with calcium in the 10-11 range.  · Initiation of monthly Xgeva 1/23/18.  · Baseline CT scan 1/30/18 with no evidence of visceral involvement.  Cluster of nodular opacities in the right lower lobe suspected to be infectious or related to bronchiolitis. Bone scan 1/30/18 showed postsurgical change in the thoracic spine, stable uptake in the frontal bone, no new areas of disease.  · Initiation of palliative oral single agent Xeloda 2/7/18 2000 mg a.m., 1500 mg p.m. for 14/21 days.   · Following 3 cycles xeloda, bone scan 4/4/18 showed no change from the prior study.  CT scan 4/4/18 showed a small pericardial effusion of unclear  significance as well as a subcutaneous nodule in the right lateral chest wall.  Subsequent evaluation with echocardiogram 4/17/18 showed no evidence of pericardial effusion.  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  We discussed the possibility of surgical excision of the right subcutaneous chest wall lesion for more definitive diagnosis.  Reviewed previous CT images dating back to 12/8/17 and the lesion was present even at that time measuring around 1.7 cm although not commented on in the radiology report.  As this appears to be an indolent low-grade process unrelated to her breast cancer, recommendee foregoing surgical excision at this time and monitoring this area on future scans.  The patient agreed.    · Following 6 cycles of Xeloda, CT 6/6/18  showed stable findings, no evidence of progressive disease.  There was a comment regarding subcutaneous abnormality in the anterior abdominal wall and this was related to Lovenox injection sites.  Bone scan 6/6/18 showed no interval change.   · CT scan and bone scan 8/13/18 following 9 cycles of Xeloda showed stable findings with no evidence of significant visceral metastases.  Her bone lesions appear stable on bone scan.  The spindle cell neoplasm in her right chest wall actually decreased in size from 2 cm down to 1.6 cm.    · The patient experienced some symptoms of diarrhea, anorexia, generalized weakness during cycle 9 Xeloda it was unclear whether this was related to a viral gastroenteritis or toxicity from treatment.  Symptoms recurred during cycle 10 and treatment was cut short by 2 days.  Symptoms attributed to Xeloda.  With cycle 11, dose and schedule altered to 1500 mg twice daily for 7 days on followed by 7 days off .  · Most recent 3-month interval scans with bone scan 3/3/2020 showing no changes.  CT scan  3/3/2020 with no significant changes..    · The patient returns today continuing on treatment with Xeloda 1500 mg twice daily 7 days on followed by 7 days off.  She is tolerating treatment reasonably well.  She is due for monthly Xgeva today. She is tolerating treatment reasonably well.  Hand-foot symptoms remain stable.  She has minimal loose stools which have actually improved recently.  We will continue on with treatment and anticipate repeat scans again in 3 months.  She will receive monthly Xgeva today.  The patient is already scheduled for upcoming CT scan and bone scan prior to her return visit.  This was moved out to 5 weeks due to scheduling issues in the office.  Scans will be in 4 weeks.  3. Right pulmonary embolism:  · Diagnosed on CT angiogram 10/21/17 involving small right lower lobe pulmonary artery.  Lower extremity Dopplers negative.  · Bilateral lower extremity Dopplers negative again 12/5/17.  · Received chronic Lovenox 1 mg/kg twice per day, transition to oral Eliquis in February 2019, continuing on Eliquis 5 mg twice daily.  4. Cancer related pain:  · Previously receiving Duragesic 50 µg patch every 72 hours along with Dilaudid 4 mg as needed for breakthrough pain  · The patient's pain improved over time and she was able to discontinue both Duragesic and Dilaudid in the interval.  · Patient does take occasional Flexeril at bedtime due to back spasm/pain when she has been more active.  · The patient does have some occasional aggravation of her chronic back pain with significant rehabilitation activity and requires an occasional dose of Dilaudid.  No narcotic requirements recently.  5. Chemotherapy-induced diarrhea with subsequent C. difficile colitis in the setting of previous ulcerative colitis:  · Patient with reported history of ulcerative colitis, is not on active therapy.  · The patient developed initial diarrhea related to Xeloda at regular dosing.  · Symptoms improved on reduced dose  Xeloda  · Flare of symptoms in October 2018 with apparent finding of C. difficile colitis by GI, treated with course of oral vancomycin with improvement in symptoms.  · Patient notes minimal intermittent diarrhea on Xeloda requiring occasional dosing of Imodium.  This is actually improved recently.  6. Traumatic left tibia/fibular fracture:  · Status post ORIF 12/6/17  · Specimen was sent for pathologic review, negative for evidence of malignancy  7. Hypercalcemia:  · Suspect hypercalcemia of malignancy, calcium in  10-11 range previously.  · Calcium normalized following initiation of monthly Xgeva on 1/23/18.  8. Chemotherapy-induced mucositis:  · Patient had a minimal degree of mucositis with cycle 2.  The patient has magic mouthwash to use as needed.  No subsequent mucositis.  9. Recurrent UTI, bladder wall thickening on CT:  · Patient had an enterococcal UTI on 3/2/18 sensitive to nitrofurantoin and received treatment, unclear how long.  · Recurrent UTI 3/20/18 with urine culture growing Klebsiella, initially treated with nitrofurantoin, transitioned to Levaquin.  · CT 4/4/18 with diffuse bladder wall thickening with increased nodular thickening at the left base.  Referral to urogynecology Dr. May Johnson.  She was placed on a prophylactic dose of trimethoprim 100 mg daily, bladder wall thickening felt to be related to recent recurrent urinary tract infections.  · Patient with urinary symptoms, treated with course of Macrobid at the end of December 2018, urine culture however was negative 12/31/18.  · Patient was found to have Klebsiella UTI 7/29/2019 which was successfully treated with Macrobid with complete resolution of symptoms.  10.   Mobility:  · The patient underwent an intensive course of rehabilitation at [facility].  She graduated from her outpatient course November 2018.  · Overall the patient has improved dramatically in terms of mobility, now walking around 1 mile per day without assistance.  11.   Depression:  · The patient weaned herself off of Cymbalta and reports that her symptoms remain stable.  12. 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  Symptoms did improve and she continued on the same dose.    · Symptoms in the hands are currently stable with mild skin cracking.  Patient will continue to use emollient cream frequently and was recommended to wear cotton gloves at night.  13. Elevated liver function studies:  · Liver function studies increased 8/20/19 with ALT 98, AST 70, normal total bilirubin.  · Negative viral hepatitis A, B, and C panel 8/23/18  · Likely related to hepatic steatosis seen on CT, no definitive evidence of metastatic liver lesions.   · Subsequent improvement in LFTs  · Today, LFTs are normal  14. Chemotherapy induced leukopenia:  · WBC today 3.51 with normal differential  15. GERD:  · The patient continues on omeprazole 40 mg daily (2 x 20 mg) with improved symptoms.    16. Insomnia:  · Patient with prior paradoxical reaction to Benadryl  · Improved previously on temazepam 15 mg nightly as needed.   · Patient noted subsequently that temazepam was having no effect.  She did increase gabapentin dosing to 600 mg nightly and this has helped.   17. Health maintenance:  · Patient notes that she has a history of colon polyps as well as ulcerative colitis and was due for a follow-up colonoscopy on 9/12/2019.  We did discuss there is no necessity to pursue colonoscopy in the setting of her metastatic breast cancer.    · The patient did undergo colonoscopy on 2/7/2020 with findings of muscular hypertrophy and diverticulosis.   18. Right shoulder pain:  · Patient was evaluated by Dr Forrester.  She has experienced difficulty over the past year and a half with her right shoulder although she has not complained of  this in prior visits to our office.  She reports difficulty with abduction.    · The patient did undergo MRI of her right shoulder on 11/21/2019 at an outside facility showing multiple abnormalities including supraspinatus tendinosis, labral tear but no evidence of metastatic disease.  · Symptoms currently improved/resolved.    Plan:  1. Continue oral Xeloda 1500 mg twice a day 7 days on followed by 7 days off.  2. Monthly Xgeva today  3. Continue Eliquis 5 mg twice daily.  4. Continue use of emollient cream on the hands and feet and continue to wear socks and gloves at night  5. Continue omeprazole 40 mg daily.    6. In 4 weeks CT chest abdomen pelvis and bone scan  7. In 5 weeks MD visit with CBC, CMP, magnesium, phosphorus and patient will be due for Xgeva    Patient continuing on high risk medication requiring intensive monitoring.

## 2020-05-11 NOTE — PROGRESS NOTES
[facility] encounter - in person visit; followed up with patient from phone conversation the patient had with Amanda Christian in April; pt stated no new complaints and things were going well and she was tolerating the medication; no n/v/d report; pt currently using a salve for dry, painful skin;

## 2020-06-02 ENCOUNTER — SPECIALTY PHARMACY (OUTPATIENT)
Dept: PHARMACY | Facility: HOSPITAL | Age: 60
End: 2020-06-02

## 2020-06-04 ENCOUNTER — TELEPHONE (OUTPATIENT)
Dept: ONCOLOGY | Facility: CLINIC | Age: 60
End: 2020-06-04

## 2020-06-04 DIAGNOSIS — G47.01 INSOMNIA DUE TO MEDICAL CONDITION: Primary | ICD-10-CM

## 2020-06-04 RX ORDER — TEMAZEPAM 15 MG/1
15 CAPSULE ORAL NIGHTLY PRN
Qty: 30 CAPSULE | Refills: 1 | OUTPATIENT
Start: 2020-06-04 | End: 2020-07-20

## 2020-06-04 NOTE — TELEPHONE ENCOUNTER
REFILL REQUEST FOR PT. RESTORIL 15MG 1 PO Q HS PRN INSOMNIA.  LAST FILLED ON 5/5/20.  CALLED TO AKOSUA [phone]  15MG # 30 1 PO Q HS PRN INSOMNIA WITH 1 REFILL.

## 2020-06-09 ENCOUNTER — MEDICATION THERAPY MANAGEMENT (OUTPATIENT)
Dept: PHARMACY | Facility: HOSPITAL | Age: 60
End: 2020-06-09

## 2020-06-09 NOTE — PROGRESS NOTES
[facility] telephone follow up- Capecitabine    Martha is doing well today. Her main complaint is dry eye and she just started prescription eye drops to help. Martha continues to use a salve and gloves/socks at night for her hands and feet which is working well for her. She reports occasional diarrhea that is relieved with Imodium. Medication administration and adherence seem appropriate; she reports no missed doses this past month. Martha has no additional questions or concerns today.

## 2020-06-15 ENCOUNTER — HOSPITAL ENCOUNTER (OUTPATIENT)
Dept: NUCLEAR MEDICINE | Facility: HOSPITAL | Age: 60
Discharge: HOME | End: 2020-06-15

## 2020-06-15 ENCOUNTER — HOSPITAL ENCOUNTER (OUTPATIENT)
Dept: CT IMAGING | Facility: HOSPITAL | Age: 60
Discharge: HOME | End: 2020-06-15
Admitting: INTERNAL MEDICINE

## 2020-06-15 DIAGNOSIS — C50.811 MALIGNANT NEOPLASM OF OVERLAPPING SITES OF RIGHT BREAST IN FEMALE, ESTROGEN RECEPTOR NEGATIVE (HCC): ICD-10-CM

## 2020-06-15 DIAGNOSIS — Z17.1 MALIGNANT NEOPLASM OF OVERLAPPING SITES OF RIGHT BREAST IN FEMALE, ESTROGEN RECEPTOR NEGATIVE (HCC): ICD-10-CM

## 2020-06-15 LAB — CREAT BLDA-MCNC: 0.8 MG/DL (ref 0.6–1.3)

## 2020-06-15 PROCEDURE — A9503 TC99M MEDRONATE: HCPCS | Performed by: INTERNAL MEDICINE

## 2020-06-15 PROCEDURE — 82565 ASSAY OF CREATININE: CPT

## 2020-06-15 PROCEDURE — 78306 BONE IMAGING WHOLE BODY: CPT

## 2020-06-15 PROCEDURE — 74177 CT ABD & PELVIS W/CONTRAST: CPT

## 2020-06-15 PROCEDURE — 0 TECHNETIUM MEDRONATE KIT: Performed by: INTERNAL MEDICINE

## 2020-06-15 PROCEDURE — 0 DIATRIZOATE MEGLUMINE & SODIUM PER 1 ML: Performed by: INTERNAL MEDICINE

## 2020-06-15 PROCEDURE — 25010000002 IOPAMIDOL 61 % SOLUTION: Performed by: INTERNAL MEDICINE

## 2020-06-15 PROCEDURE — 71260 CT THORAX DX C+: CPT

## 2020-06-15 RX ORDER — TC 99M MEDRONATE 20 MG/10ML
21.4 INJECTION, POWDER, LYOPHILIZED, FOR SOLUTION INTRAVENOUS
Status: COMPLETED | OUTPATIENT
Start: 2020-06-15 | End: 2020-06-15

## 2020-06-15 RX ADMIN — DIATRIZOATE MEGLUMINE AND DIATRIZOATE SODIUM 30 ML: 600; 100 SOLUTION ORAL; RECTAL at 09:30

## 2020-06-15 RX ADMIN — IOPAMIDOL 85 ML: 612 INJECTION, SOLUTION INTRAVENOUS at 10:42

## 2020-06-15 RX ADMIN — Medication 21.4 MILLICURIE: at 09:25

## 2020-06-22 ENCOUNTER — OFFICE VISIT (OUTPATIENT)
Dept: ONCOLOGY | Facility: CLINIC | Age: 60
End: 2020-06-22

## 2020-06-22 ENCOUNTER — INFUSION (OUTPATIENT)
Dept: ONCOLOGY | Facility: HOSPITAL | Age: 60
End: 2020-06-22

## 2020-06-22 ENCOUNTER — LAB (OUTPATIENT)
Dept: OTHER | Facility: HOSPITAL | Age: 60
End: 2020-06-22

## 2020-06-22 VITALS
HEART RATE: 85 BPM | SYSTOLIC BLOOD PRESSURE: 133 MMHG | RESPIRATION RATE: 16 BRPM | WEIGHT: 198.3 LBS | DIASTOLIC BLOOD PRESSURE: 72 MMHG | OXYGEN SATURATION: 98 % | HEIGHT: 61 IN | BODY MASS INDEX: 37.44 KG/M2 | TEMPERATURE: 97.7 F

## 2020-06-22 DIAGNOSIS — Z17.1 MALIGNANT NEOPLASM OF OVERLAPPING SITES OF RIGHT BREAST IN FEMALE, ESTROGEN RECEPTOR NEGATIVE (HCC): ICD-10-CM

## 2020-06-22 DIAGNOSIS — C50.811 MALIGNANT NEOPLASM OF OVERLAPPING SITES OF RIGHT BREAST IN FEMALE, ESTROGEN RECEPTOR NEGATIVE (HCC): ICD-10-CM

## 2020-06-22 DIAGNOSIS — Z17.1 MALIGNANT NEOPLASM OF OVERLAPPING SITES OF RIGHT BREAST IN FEMALE, ESTROGEN RECEPTOR NEGATIVE (HCC): Primary | ICD-10-CM

## 2020-06-22 DIAGNOSIS — C50.811 MALIGNANT NEOPLASM OF OVERLAPPING SITES OF RIGHT BREAST IN FEMALE, ESTROGEN RECEPTOR NEGATIVE (HCC): Primary | ICD-10-CM

## 2020-06-22 LAB
ALBUMIN SERPL-MCNC: 4.1 G/DL (ref 3.5–5.2)
ALBUMIN/GLOB SERPL: 2 G/DL
ALP SERPL-CCNC: 57 U/L (ref 39–117)
ALT SERPL W P-5'-P-CCNC: 34 U/L (ref 1–33)
ANION GAP SERPL CALCULATED.3IONS-SCNC: 5.5 MMOL/L (ref 5–15)
AST SERPL-CCNC: 33 U/L (ref 1–32)
BASOPHILS # BLD AUTO: 0.05 10*3/MM3 (ref 0–0.2)
BASOPHILS NFR BLD AUTO: 1.1 % (ref 0–1.5)
BILIRUB SERPL-MCNC: 0.5 MG/DL (ref 0.1–1.2)
BUN BLD-MCNC: 27 MG/DL (ref 6–20)
BUN/CREAT SERPL: 34.2 (ref 7–25)
CALCIUM SPEC-SCNC: 9.3 MG/DL (ref 8.6–10.5)
CHLORIDE SERPL-SCNC: 102 MMOL/L (ref 98–107)
CO2 SERPL-SCNC: 30.5 MMOL/L (ref 22–29)
CREAT BLD-MCNC: 0.79 MG/DL (ref 0.57–1)
DEPRECATED RDW RBC AUTO: 55.4 FL (ref 37–54)
EOSINOPHIL # BLD AUTO: 0.21 10*3/MM3 (ref 0–0.4)
EOSINOPHIL NFR BLD AUTO: 4.8 % (ref 0.3–6.2)
ERYTHROCYTE [DISTWIDTH] IN BLOOD BY AUTOMATED COUNT: 14.5 % (ref 12.3–15.4)
GFR SERPL CREATININE-BSD FRML MDRD: 74 ML/MIN/1.73
GLOBULIN UR ELPH-MCNC: 2.1 GM/DL
GLUCOSE BLD-MCNC: 92 MG/DL (ref 65–99)
HCT VFR BLD AUTO: 38.2 % (ref 34–46.6)
HGB BLD-MCNC: 12.9 G/DL (ref 12–15.9)
IMM GRANULOCYTES # BLD AUTO: 0.02 10*3/MM3 (ref 0–0.05)
IMM GRANULOCYTES NFR BLD AUTO: 0.5 % (ref 0–0.5)
LYMPHOCYTES # BLD AUTO: 0.88 10*3/MM3 (ref 0.7–3.1)
LYMPHOCYTES NFR BLD AUTO: 20.2 % (ref 19.6–45.3)
MAGNESIUM SERPL-MCNC: 2.1 MG/DL (ref 1.6–2.6)
MCH RBC QN AUTO: 35.6 PG (ref 26.6–33)
MCHC RBC AUTO-ENTMCNC: 33.8 G/DL (ref 31.5–35.7)
MCV RBC AUTO: 105.5 FL (ref 79–97)
MONOCYTES # BLD AUTO: 0.34 10*3/MM3 (ref 0.1–0.9)
MONOCYTES NFR BLD AUTO: 7.8 % (ref 5–12)
NEUTROPHILS # BLD AUTO: 2.86 10*3/MM3 (ref 1.7–7)
NEUTROPHILS NFR BLD AUTO: 65.6 % (ref 42.7–76)
NRBC BLD AUTO-RTO: 0 /100 WBC (ref 0–0.2)
PHOSPHATE SERPL-MCNC: 2.9 MG/DL (ref 2.5–4.5)
PLAT MORPH BLD: NORMAL
PLATELET # BLD AUTO: 221 10*3/MM3 (ref 140–450)
PMV BLD AUTO: 10.3 FL (ref 6–12)
POTASSIUM BLD-SCNC: 4.9 MMOL/L (ref 3.5–5.2)
PROT SERPL-MCNC: 6.2 G/DL (ref 6–8.5)
RBC # BLD AUTO: 3.62 10*6/MM3 (ref 3.77–5.28)
RBC MORPH BLD: NORMAL
SODIUM BLD-SCNC: 138 MMOL/L (ref 136–145)
WBC MORPH BLD: NORMAL
WBC NRBC COR # BLD: 4.36 10*3/MM3 (ref 3.4–10.8)

## 2020-06-22 PROCEDURE — 36415 COLL VENOUS BLD VENIPUNCTURE: CPT

## 2020-06-22 PROCEDURE — 84100 ASSAY OF PHOSPHORUS: CPT | Performed by: INTERNAL MEDICINE

## 2020-06-22 PROCEDURE — 85007 BL SMEAR W/DIFF WBC COUNT: CPT | Performed by: INTERNAL MEDICINE

## 2020-06-22 PROCEDURE — 85025 COMPLETE CBC W/AUTO DIFF WBC: CPT | Performed by: INTERNAL MEDICINE

## 2020-06-22 PROCEDURE — 25010000002 DENOSUMAB 120 MG/1.7ML SOLUTION: Performed by: INTERNAL MEDICINE

## 2020-06-22 PROCEDURE — 80053 COMPREHEN METABOLIC PANEL: CPT | Performed by: INTERNAL MEDICINE

## 2020-06-22 PROCEDURE — 99215 OFFICE O/P EST HI 40 MIN: CPT | Performed by: INTERNAL MEDICINE

## 2020-06-22 PROCEDURE — 96372 THER/PROPH/DIAG INJ SC/IM: CPT

## 2020-06-22 PROCEDURE — 83735 ASSAY OF MAGNESIUM: CPT | Performed by: INTERNAL MEDICINE

## 2020-06-22 RX ORDER — LIFITEGRAST 50 MG/ML
SOLUTION/ DROPS OPHTHALMIC
COMMUNITY
Start: 2020-06-10 | End: 2021-10-18

## 2020-06-22 RX ORDER — CEPHALEXIN 500 MG/1
CAPSULE ORAL
COMMUNITY
Start: 2020-05-20 | End: 2020-06-22

## 2020-06-22 RX ORDER — AMOXICILLIN 500 MG/1
CAPSULE ORAL
COMMUNITY
Start: 2020-06-10 | End: 2020-06-22

## 2020-06-22 RX ADMIN — DENOSUMAB 120 MG: 120 INJECTION SUBCUTANEOUS at 13:35

## 2020-06-22 NOTE — PROGRESS NOTES
Subjective .     REASONS FOR FOLLOWUP:    1. Stage Ib ([id number]) right breast cancer: Diagnosed May 2010 with excisional biopsy for invasive ductal carcinoma, 1.3 cm, grade 2, ER 90%, FL 80%, HER-2/peter negative (1+ IHC).  Subsequent right mastectomy in July 2010 with no residual breast malignancy, 1/5 sentinel lymph node with micrometastasis (0.25 mm).  Treated in the [facility] system with adjuvant AC ×4 cycles in 2010 (no taxanes administered due to underlying Charcot-Saloni-Tooth with peripheral neuropathy).  Adjuvant Femara (postmenopausal) initiated October 2010 with plan to treat ×10 years.  Genetic testing reportedly negative.  Developed osteopenia treated with Prolia beginning 2/27/13.  2. Recurrent/metastatic disease identified 10/8/17 involving thoracic spine with cord compression at T6, lumbosacral involvement, sternal and right sternoclavicular involvement.  Femara discontinued.  Radiation administered (in the [facility] system) to the thoracic spine beginning 10/19/17 treating T3-T9 to a dose of 24 gray in 6 fractions.  3. Evaluation with MRI 12/8/17 showing persistent T6 cord compression with persistent neurologic compromise requiring surgical treatment 12/11/17 with T6 laminectomy/corpectomy and T3-T9 fusion.  Pathology with metastatic carcinoma of breast origin, ER negative, FL negative, HER-2/peter negative (1+ IHC).  4. Right pulmonary embolism 10/21/17 treated with Lovenox 1 mg/kg (100 mg) every 12 hours.  No evidence of DVT.  Lovenox held due to right gluteus hematoma 3/23/18 through 4/6/18.  Transitioned to oral Eliquis 5mg bid 1/28/19 (as of 2/25/19, patient still using previous supply of Lovenox).  5. Cancer related pain previously on Duragesic 50 µg patch every 72 hours and Dilaudid 4 mg as needed for breakthrough pain.  Narcotics subsequently discontinued with improvement in pain in January 2018.  6. Radiation therapy to L3 dural metastasis and left humerus initiated 1/15/18 (10 fractions),  completed 1/26/18  7. Monthly Xgeva initiated 1/23/18  8. Mild hypercalcemia of malignancy  9. Initiation of palliative oral single agent Xeloda 2/7/18 (2000 mg a.m., 1500 mg p.m. for 14/21 days).  10. 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  In reviewing prior CT scans, has been present for some time.  Monitor for now, if it enlarges consider surgical excision.  11. Cumulative side effects from Xeloda with fatigue, anorexia, diarrhea, increased tearing.  Alteration in dose/schedule with cycle 11 to 1500 mg twice a day for 7 days on followed by 7 days off.    HISTORY OF PRESENT ILLNESS:  The patient is a 59 y.o. year old female who is here for follow-up with the above-mentioned history.  The patient continues on Xeloda    History of Present Illness the patient returns today in follow-up continuing on oral [id number] mg twice daily 7 days on followed by 7 days off and also continuing on monthly Xgeva that is due today with laboratory studies, CT scan, and bone scan to review.  Patient continues to tolerate treatment extremely well.  She reports no change in her chronic hand-foot symptoms and continues to use emollient cream frequently.  She does have some complaints in regards to her vision.  At the last visit she was complaining of blurred vision however reports that that is now improving.  She has had some difficulty with irritation in her eyes and a burning sensation.  She has been in see her ophthalmologist recently and is using xiidra eyedrops which have helped.  She notes some very occasional loose stools for which she takes Imodium.  She denies any mucositis.  She notes that she is sleeping well continuing on gabapentin 600 mg nightly.  Fatigue is mild and tolerable.    Past Medical History:   Diagnosis Date   • Acute pulmonary embolism,  cancer-related 10/2017   • Allergic rhinitis    • Arthritis     Right knee; left shoulder   • Cancer (CMS/HCC) 2010    Right breast   • Cancer (CMS/[facility]) 10/2017    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   • Charcot-Saloni-Tooth disease    • CPAP (continuous positive airway pressure) dependence    • GERD (gastroesophageal reflux disease)    • H/O Colon polyps    • H/O Uterine fibroid    • Heart murmur    • Hyperlipidemia    • Hypertension    • PONV (postoperative nausea and vomiting)      Past Surgical History:   Procedure Laterality Date   • BLADDER SURGERY      Bladder lift   • BREAST RECONSTRUCTION, BREAST TISSUE EXPANDER INSERTION Right    • BREAST SURGERY Right 2010    Mastectomy   •  SECTION  ,    • CHOLECYSTECTOMY     • COLONOSCOPY     • HERNIA REPAIR  2015    [facility]   • HYSTERECTOMY      Partial   • KNEE SURGERY Right    • LEG SURGERY Left     Broken   • MASTECTOMY Right    • KS TREAT TIBIAL SHAFT FX, INTRAMED IMPLANT Left 2017    Procedure: TIBIA INTRAMEDULLARY NAIL/DON INSERTION;  Surgeon: Romero Shanks MD;  Location: [facility];  Service: Orthopedics   • THORACIC DECOMPRESSION POSTERIOR FUSION WITH INSTRUMENTATION N/A 2017    Procedure: T6 costotransversectomy and decompression with T3 to  T9 posterior spinal fusion with instrumentation with Jennifer Gonzalez and Nael Wilkes;  Surgeon: Quan Nayak MD;  Location: [facility];  Service:        ONCOLOGIC HISTORY:  (History from previous dates can be found in the separate document.)    Current Outpatient Medications on File Prior to Visit   Medication Sig Dispense Refill   • acetaminophen (TYLENOL) 500 MG tablet Take 500 mg by mouth Every 6 (Six) Hours As Needed for Mild Pain .     • calcium carbonate (OS-CARY) 600 MG tablet Take 600 mg by mouth 2 (Two) Times a Day With Meals.     • capecitabine (XELODA) 500 MG chemo tablet Take 3 tabs (1500 mg) by mouth twice a day for 7 days on then 7 days off. 84  tablet 3   • cholecalciferol (VITAMIN D3) 1000 units tablet Take 1,000 Units by mouth Daily.     • diazePAM (VALIUM) 10 MG tablet Take 1 tablet by mouth Every 12 (Twelve) Hours As Needed for Anxiety. 10 tablet 1   • diphenoxylate-atropine (LOMOTIL) 2.5-0.025 MG per tablet Take 1 tablet by mouth 4 (Four) Times a Day As Needed for Diarrhea. 60 tablet 0   • ELIQUIS 5 MG tablet tablet TAKE 1 TABLET BY MOUTH EVERY 12 HOURS 60 tablet 6   • FLONASE ALLERGY RELIEF 50 MCG/ACT nasal spray 2 sprays into each nostril daily.  11   • gabapentin (NEURONTIN) 300 MG capsule Take 1 capsule by mouth Daily. 10 capsule 1   • gabapentin (Neurontin) 300 MG capsule Take 1 capsule by mouth 2 (Two) Times a Day. 180 capsule 1   • ketoconazole (NIZORAL) 2 % cream Apply  topically to the appropriate area as directed Daily. 15 g 0   • losartan (COZAAR) 50 MG tablet Take 1 tablet by mouth Daily. 90 tablet 2   • mesalamine (LIALDA) 1.2 g EC tablet TAKE 1 TABLET BY MOUTH TWICE DAILY 180 tablet 0   • metaxalone (SKELAXIN) 800 MG tablet Take 1 tablet by mouth 3 (Three) Times a Day As Needed for Muscle Spasms. 30 tablet 3   • mupirocin (BACTROBAN) 2 % ointment JUANITA TO SURGICAL SITE AND COVER WITH BAND AID D UNTIL HEALED     • omeprazole (PriLOSEC) 40 MG capsule TAKE 1 CAPSULE DAILY 90 capsule 2   • ondansetron ODT (ZOFRAN-ODT) 8 MG disintegrating tablet Take 1 tablet by mouth Every 8 (Eight) Hours As Needed for Nausea or Vomiting. 30 tablet 1   • phenazopyridine (PYRIDIUM) 200 MG tablet Take 200 mg by mouth 3 (Three) Times a Day As Needed for bladder spasms.     • prochlorperazine (COMPAZINE) 10 MG tablet Take 1 tablet by mouth Every 6 (Six) Hours As Needed for Nausea or Vomiting. 30 tablet 0   • sennosides-docusate sodium (SENOKOT-S) 8.6-50 MG tablet Take 2 tablets by mouth 2 (Two) Times a Day. 90 tablet 2   • sucralfate (CARAFATE) 1 g tablet Take 1 tablet by mouth 4 (Four) Times a Day. 30 tablet 2   • temazepam (RESTORIL) 15 MG capsule Take 1 capsule  by mouth At Night As Needed for Sleep. 30 capsule 1   • traMADol (ULTRAM) 50 MG tablet Take 1 tablet by mouth Every 8 (Eight) Hours As Needed for Moderate Pain . 90 tablet 1   • triamcinolone (KENALOG) 0.1 % cream Apply  topically to the appropriate area as directed 2 (Two) Times a Day. 15 g 0   • trimethoprim (TRIMPEX) 100 MG tablet Take 100 mg by mouth Daily.     • XIIDRA 5 % ophthalmic solution INSTILL 1 DROP INTO EACH EYE BID.     • amoxicillin (AMOXIL) 500 MG capsule DNC     • cephalexin (KEFLEX) 500 MG capsule TK ONE C PO TID FOR 7 DAYS       No current facility-administered medications on file prior to visit.        ALLERGIES:     Allergies   Allergen Reactions   • Hydrocodone Nausea Only   • Morphine And Related Nausea And Vomiting     Social History     Socioeconomic History   • Marital status:      Spouse name: Murray   • Number of children: 3   • Years of education: College   • Highest education level: Not on file   Occupational History     Employer: [employer]     Employer: RETIRED   Tobacco Use   • Smoking status: Never Smoker   • Smokeless tobacco: Never Used   Substance and Sexual Activity   • Alcohol use: Yes     Comment: social   • Drug use: No   • Sexual activity: Defer     Family History   Problem Relation Age of Onset   • Heart disease Mother    • Hyperlipidemia Mother    • Hypertension Mother    • Diabetes Father    • Charcot-Saloni-Tooth disease Father    • Heart disease Father    • Hypertension Father    • Heart failure Father    • Diabetes Sister    • Heart disease Sister    • Hypertension Sister    • Heart disease Maternal Aunt    • Scoliosis Maternal Aunt    • Heart disease Maternal Uncle    • Diabetes Maternal Grandmother    • Heart disease Maternal Grandmother    • Hypertension Maternal Grandmother    • Uterine cancer Maternal Grandmother    • Heart disease Maternal Grandfather      Review of Systems   Constitutional: Positive for fatigue. Negative for activity change, appetite  change, fever and unexpected weight change.   HENT: Negative for congestion, mouth sores, nosebleeds, sore throat and voice change.    Eyes: Positive for itching and visual disturbance. Negative for discharge.   Respiratory: Negative for cough, shortness of breath and wheezing.    Cardiovascular: Negative for chest pain, palpitations and leg swelling.   Gastrointestinal: Positive for diarrhea. Negative for abdominal distention, abdominal pain, blood in stool, constipation, nausea and vomiting.   Endocrine: Negative for cold intolerance and heat intolerance.   Genitourinary: Negative for difficulty urinating, dysuria, flank pain, frequency, hematuria and urgency.   Musculoskeletal: Negative for arthralgias, back pain, joint swelling and myalgias.   Skin: Positive for rash. Negative for wound.   Neurological: Negative for dizziness, syncope, weakness, light-headedness, numbness and headaches.   Hematological: Negative for adenopathy. Does not bruise/bleed easily.   Psychiatric/Behavioral: Positive for sleep disturbance. Negative for confusion. The patient is not nervous/anxious.          Objective      Vitals:    06/22/20 1254   BP: 133/72   Pulse: 85   Resp: 16   Temp: 97.7 °F (36.5 °C)   SpO2: 98%      Current Status 6/22/2020   ECOG score 1   Pain 0/10    Physical Exam   Constitutional: She is oriented to person, place, and time. She appears well-developed and well-nourished.   HENT:   Mouth/Throat: Oropharynx is clear and moist.   Eyes: Conjunctivae are normal.   Neck: No thyromegaly present.   Cardiovascular: Normal rate and regular rhythm. Exam reveals no gallop and no friction rub.   No murmur heard.  Pulmonary/Chest: Breath sounds normal. No respiratory distress.   Abdominal: Soft. Bowel sounds are normal. She exhibits no distension. There is no tenderness.   Musculoskeletal: She exhibits no edema.   Lower extremity braces in place.  No significant lower extremity edema   Lymphadenopathy:        Head (right  side): No submandibular adenopathy present.     She has no cervical adenopathy.     She has no axillary adenopathy.        Right: No inguinal and no supraclavicular adenopathy present.        Left: No inguinal and no supraclavicular adenopathy present.   Neurological: She is alert and oriented to person, place, and time. She displays normal reflexes. No cranial nerve deficit. She exhibits normal muscle tone.   Bilateral lower extremity strength, 4+/5   Skin: Skin is warm and dry. Rash noted.   Mild erythema involving the palms of the hands.  Slight skin cracking in the hands.    Psychiatric: She has a normal mood and affect. Her behavior is normal.   The patient was examined today, unchanged from above    RECENT LABS:  Hematology WBC   Date Value Ref Range Status   06/22/2020 4.36 3.40 - 10.80 10*3/mm3 Final   11/04/2019 4.09 3.40 - 10.80 10*3/mm3 Final     RBC   Date Value Ref Range Status   06/22/2020 3.62 (L) 3.77 - 5.28 10*6/mm3 Final   11/04/2019 4.26 3.77 - 5.28 10*6/mm3 Final     Hemoglobin   Date Value Ref Range Status   06/22/2020 12.9 12.0 - 15.9 g/dL Final     Hematocrit   Date Value Ref Range Status   06/22/2020 38.2 34.0 - 46.6 % Final     Platelets   Date Value Ref Range Status   06/22/2020 221 140 - 450 10*3/mm3 Final        Lab Results   Component Value Date    GLUCOSE 92 06/22/2020    BUN 27 (H) 06/22/2020    CREATININE 0.79 06/22/2020    EGFRIFNONA 74 06/22/2020    EGFRIFAFRI 66 11/04/2019    BCR 34.2 (H) 06/22/2020    K 4.9 06/22/2020    CO2 30.5 (H) 06/22/2020    CALCIUM 9.3 06/22/2020    PROTENTOTREF 6.3 11/04/2019    ALBUMIN 4.10 06/22/2020    LABIL2 2.3 11/04/2019    AST 33 (H) 06/22/2020    ALT 34 (H) 06/22/2020     Reviewed bone scan and CT scan chest abdomen pelvis as outlined below.  I did personally review CT images.    Assessment/Plan      1. Previous Stage Ib ([id number]) right breast cancer:  · Diagnosed May 2010 with excisional biopsy for invasive ductal carcinoma, 1.3 cm, grade 2,  ER 90%, DC 80%, HER-2/peter negative (1+ IHC).    · Subsequent right mastectomy in July 2010 with no residual breast malignancy, 1/5 sentinel lymph node with micrometastasis (0.25 mm).    · Treated in the [facility] system with adjuvant AC ×4 cycles in 2010 (no taxanes administered due to underlying Charcot-Saloni-Tooth with peripheral neuropathy).    · Adjuvant Femara (postmenopausal) initiated October 2010 with plan to treat ×10 years.    · Genetic testing reportedly negative.    · Developed osteopenia treated with Prolia beginning 2/27/13. Subsequently discontinued due to identification of metastatic disease.  2. Recurrent/metastatic disease identified 10/8/17:  · Disease involving thoracic spine with cord compression at T6, lumbosacral involvement, sternal and right sternoclavicular involvement.    · Femara discontinued in 10/2017.    · Radiation administered (in the [facility] system) to the thoracic spine beginning 10/19/17 treating T3-T9 to a dose of 24 gray in 6 fractions.  · Evaluation with MRI 12/8/17 showing persistent T6 cord compression with persistent neurologic compromise requiring surgical treatment 12/11/17 with T6 laminectomy/corpectomy and T3-T9 fusion.  Pathology with metastatic carcinoma of breast origin, ER negative, DC negative, HER-2/peter negative (1+ IHC).  · Additional staging evaluation 12/8/17 with no evidence of visceral metastatic disease, bone scan showing involvement of thoracic spine, sternum, left humerus, mid frontal bone.  No plane film correlate of left humerus lesion.  MRI lumbar spine with small intradural L3 metastasis.  CA 15-3 12/6/17- 17.  · Palliative radiation therapy to L3 dural metastasis and left humerus initiated 1/15/18 (10 fractions), completed 1/26/18.  · Hypercalcemia of malignancy with calcium in the 10-11 range.  · Initiation of monthly Xgeva 1/23/18.  · Baseline CT scan 1/30/18 with no evidence of visceral involvement.  Cluster of nodular opacities in the right lower lobe  suspected to be infectious or related to bronchiolitis. Bone scan 1/30/18 showed postsurgical change in the thoracic spine, stable uptake in the frontal bone, no new areas of disease.  · Initiation of palliative oral single agent Xeloda 2/7/18 2000 mg a.m., 1500 mg p.m. for 14/21 days.   · Following 3 cycles xeloda, bone scan 4/4/18 showed no change from the prior study.  CT scan 4/4/18 showed a small pericardial effusion of unclear significance as well as a subcutaneous nodule in the right lateral chest wall.  Subsequent evaluation with echocardiogram 4/17/18 showed no evidence of pericardial effusion.  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  We discussed the possibility of surgical excision of the right subcutaneous chest wall lesion for more definitive diagnosis.  Reviewed previous CT images dating back to 12/8/17 and the lesion was present even at that time measuring around 1.7 cm although not commented on in the radiology report.  As this appears to be an indolent low-grade process unrelated to her breast cancer, recommendee foregoing surgical excision at this time and monitoring this area on future scans.  The patient agreed.    · Following 6 cycles of Xeloda, CT 6/6/18  showed stable findings, no evidence of progressive disease.  There was a comment regarding subcutaneous abnormality in the anterior abdominal wall and this was related to Lovenox injection sites.  Bone scan 6/6/18 showed no interval change.   · CT scan and bone scan 8/13/18 following 9 cycles of Xeloda showed stable findings with no evidence of significant visceral metastases.  Her bone lesions appear stable on bone scan.  The spindle cell neoplasm in her right chest wall actually decreased in size from 2 cm down to 1.6 cm.    · The patient experienced some symptoms of diarrhea, anorexia,  generalized weakness during cycle 9 Xeloda it was unclear whether this was related to a viral gastroenteritis or toxicity from treatment.  Symptoms recurred during cycle 10 and treatment was cut short by 2 days.  Symptoms attributed to Xeloda.  With cycle 11, dose and schedule altered to 1500 mg twice daily for 7 days on followed by 7 days off .  · Most recent 3-month interval scans with bone scan 6/5/2020 showing no changes.  CT scan 6/5/2020 with no significant changes..    · The patient returns today continuing on treatment with Xeloda 1500 mg twice daily 7 days on followed by 7 days off.  She is due for monthly Xgeva today as well.  She has 3-month interval CT scan and bone scan to review today.  She has been tolerating treatment relatively well although is having some chronic side effects from treatment.  Her hand-foot symptoms are stable but are bothersome.  She has had some visual disturbances which I feel are likely in part related to Xeloda with some blurred vision that is actually improved slightly and some itching and burning in her eyes.  She was started on xiidra ophthalmic drops by her ophthalmologist which have helped.  We reviewed her scans which showed no change from the prior studies.  With stable disease and good tolerance to treatment we will continue on as planned.  She will receive Xgeva today.  She will continue on Xeloda at 1500 mg twice daily 7 days on followed by 7 days off.  In 4 weeks she will have nurse practitioner visit when she is again due for Xgeva.  I will see her in 8 weeks with Xgeva due.  In 11 weeks we will repeat CT scans and bone scan and I will see her in 12 weeks again for follow-up.  She is aware to contact us with any new issues in the interval.  3. Right pulmonary embolism:  · Diagnosed on CT angiogram 10/21/17 involving small right lower lobe pulmonary artery.  Lower extremity Dopplers negative.  · Bilateral lower extremity Dopplers negative again 12/5/17.  · Received  chronic Lovenox 1 mg/kg twice per day, transition to oral Eliquis in February 2019, continuing on Eliquis 5 mg twice daily.  4. Cancer related pain:  · Previously receiving Duragesic 50 µg patch every 72 hours along with Dilaudid 4 mg as needed for breakthrough pain  · The patient's pain improved over time and she was able to discontinue both Duragesic and Dilaudid in the interval.  · Patient does take occasional Flexeril at bedtime due to back spasm/pain when she has been more active.  · The patient does have some occasional aggravation of her chronic back pain with significant rehabilitation activity and requires an occasional dose of Dilaudid.  No narcotic requirements recently.  5. Chemotherapy-induced diarrhea with subsequent C. difficile colitis in the setting of previous ulcerative colitis:  · Patient with reported history of ulcerative colitis, is not on active therapy.  · The patient developed initial diarrhea related to Xeloda at regular dosing.  · Symptoms improved on reduced dose Xeloda  · Flare of symptoms in October 2018 with apparent finding of C. difficile colitis by GI, treated with course of oral vancomycin with improvement in symptoms.  · Patient notes minimal intermittent diarrhea on Xeloda requiring occasional dosing of Imodium.  This is actually improved recently.  6. Traumatic left tibia/fibular fracture:  · Status post ORIF 12/6/17  · Specimen was sent for pathologic review, negative for evidence of malignancy  7. Hypercalcemia:  · Suspect hypercalcemia of malignancy, calcium in  10-11 range previously.  · Calcium normalized following initiation of monthly Xgeva on 1/23/18.  8. Chemotherapy-induced mucositis:  · Patient had a minimal degree of mucositis with cycle 2.  The patient has magic mouthwash to use as needed.  No subsequent mucositis.  9. Recurrent UTI, bladder wall thickening on CT:  · Patient had an enterococcal UTI on 3/2/18 sensitive to nitrofurantoin and received treatment,  unclear how long.  · Recurrent UTI 3/20/18 with urine culture growing Klebsiella, initially treated with nitrofurantoin, transitioned to Levaquin.  · CT 4/4/18 with diffuse bladder wall thickening with increased nodular thickening at the left base.  Referral to urogynecology Dr. May Johnson.  She was placed on a prophylactic dose of trimethoprim 100 mg daily, bladder wall thickening felt to be related to recent recurrent urinary tract infections.  · Patient with urinary symptoms, treated with course of Macrobid at the end of December 2018, urine culture however was negative 12/31/18.  · Patient was found to have Klebsiella UTI 7/29/2019 which was successfully treated with Macrobid with complete resolution of symptoms.  10.   Mobility:  · The patient underwent an intensive course of rehabilitation at [facility].  She graduated from her outpatient course November 2018.  · Overall the patient has improved dramatically in terms of mobility, now walking around 1 mile per day without assistance.  11.  Depression:  · The patient weaned herself off of Cymbalta and reports that her symptoms remain stable.  12. 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  Symptoms did improve and she continued on the same dose.    · Symptoms in the hands are currently stable with mild skin cracking.  Patient will continue to use emollient cream frequently and was recommended to wear cotton gloves at night.  13. Evidence of steatosis on scans with mild intermittent elevated liver function studies:  · Liver function studies increased 8/20/19 with ALT 98, AST 70, normal total bilirubin.  · Negative viral hepatitis A, B, and C panel 8/23/18  · Likely related to hepatic steatosis.   · Subsequent improvement in LFTs  · Today, ALT is 34, AST 33, borderline elevated.  It is  noted on her current CT that there is radiographic evidence of steatosis which is the cause of her mild elevation in transaminases.  We will monitor every 4 weeks.  14. Chemotherapy induced leukopenia:  · WBC today 3.51 with normal differential  15. GERD:  · The patient continues on omeprazole 40 mg daily (2 x 20 mg) with improved symptoms.    16. Insomnia:  · Patient with prior paradoxical reaction to Benadryl  · Improved previously on temazepam 15 mg nightly as needed.   · Patient noted subsequently that temazepam was having no effect.  She did increase gabapentin dosing to 600 mg nightly and this has helped.   17. Health maintenance:  · Patient notes that she has a history of colon polyps as well as ulcerative colitis and was due for a follow-up colonoscopy on 9/12/2019.  We did discuss there is no necessity to pursue colonoscopy in the setting of her metastatic breast cancer.    · The patient did undergo colonoscopy on 2/7/2020 with findings of muscular hypertrophy and diverticulosis.   18. Right shoulder pain:  · Patient was evaluated by Dr Forrester.  She has experienced difficulty over the past year and a half with her right shoulder although she has not complained of this in prior visits to our office.  She reports difficulty with abduction.    · The patient did undergo MRI of her right shoulder on 11/21/2019 at an outside facility showing multiple abnormalities including supraspinatus tendinosis, labral tear but no evidence of metastatic disease.  · Symptoms currently improved/resolved.  19. Ocular changes in part related to Xeloda:  · Patient with recent mild degree of blurred vision and also with some burning and pruritus.  · She was seen by her ophthalmologist recently and has been continuing on xiidra ophthalmic drops which have helped.  · Likely both issues are to some extent related to Xeloda.    Plan:  1. Continue oral Xeloda 1500 mg twice a day 7 days on followed by 7 days off.  2. Monthly Xgeva  today  3. Continue Eliquis 5 mg twice daily.  4. Continue use of emollient cream on the hands and feet and continue to wear socks and gloves at night  5. Continue omeprazole 40 mg daily.    6. Continue xiidra ophthalmic drops prescribed by ophthalmology  7. In 4 weeks nurse practitioner visit with CBC, CMP, magnesium, phosphorus and monthly Xgeva  8. In 8 weeks MD visit with CBC, CMP, magnesium, phosphorus and monthly Xgeva  9. In 11 weeks CT chest abdomen pelvis and bone scan  10. In 12 weeks MD visit with CBC, CMP, magnesium, phosphorus and patient will be due for Xgeva    Patient continuing on high risk medication requiring intensive monitoring.

## 2020-06-23 ENCOUNTER — MEDICATION THERAPY MANAGEMENT (OUTPATIENT)
Dept: PHARMACY | Facility: HOSPITAL | Age: 60
End: 2020-06-23

## 2020-06-23 NOTE — PROGRESS NOTES
[facility] Lab Review- Capecitabine         6/22/2020   WBC 3.40 - 10.80 10*3/mm3 4.36   Neutrophils Absolute 1.70 - 7.00 10*3/mm3 2.86   Hemoglobin 12.0 - 15.9 g/dL 12.9   Hematocrit 34.0 - 46.6 % 38.2   Platelets 140 - 450 10*3/mm3 221   Creatinine 0.57 - 1.00 mg/dL 0.79   eGFR Non African Am >60 mL/min/1.73 74   BUN 6 - 20 mg/dL 27 (A)   Sodium 136 - 145 mmol/L 138   Potassium 3.5 - 5.2 mmol/L 4.9   Glucose 65 - 99 mg/dL 92   Magnesium 1.6 - 2.6 mg/dL 2.1   Calcium 8.6 - 10.5 mg/dL 9.3   Albumin 3.50 - 5.20 g/dL 4.10   Total Protein 6.0 - 8.5 g/dL 6.2   AST (SGOT) 1 - 32 U/L 33 (A)   ALT (SGPT) 1 - 33 U/L 34 (A)   Alkaline Phosphatase 39 - 117 U/L 57   Total Bilirubin 0.1 - 1.2 mg/dL 0.5     MD dictation noted.

## 2020-06-30 ENCOUNTER — SPECIALTY PHARMACY (OUTPATIENT)
Dept: PHARMACY | Facility: HOSPITAL | Age: 60
End: 2020-06-30

## 2020-07-09 ENCOUNTER — MEDICATION THERAPY MANAGEMENT (OUTPATIENT)
Dept: PHARMACY | Facility: HOSPITAL | Age: 60
End: 2020-07-09

## 2020-07-09 NOTE — PROGRESS NOTES
[facility] telephone follow up- capecitabine    Martha is doing well today. She has no new issues or complaints with her capecitabine. The xiidra eye drops are working well for her and she continues to use moisturizer and gloves at night for her hands. Medication administration and adherence seem appropriate; she has not missed any doses this past month. She reports no recent medication changes. Martha has no additional questions or concerns for me today. Pharmacy will continue to follow.    Thanks,  Lubna Gupta, PharmD

## 2020-07-20 ENCOUNTER — LAB (OUTPATIENT)
Dept: OTHER | Facility: HOSPITAL | Age: 60
End: 2020-07-20

## 2020-07-20 ENCOUNTER — OFFICE VISIT (OUTPATIENT)
Dept: ONCOLOGY | Facility: CLINIC | Age: 60
End: 2020-07-20

## 2020-07-20 VITALS
RESPIRATION RATE: 16 BRPM | SYSTOLIC BLOOD PRESSURE: 146 MMHG | HEART RATE: 102 BPM | BODY MASS INDEX: 37 KG/M2 | HEIGHT: 61 IN | TEMPERATURE: 97.9 F | DIASTOLIC BLOOD PRESSURE: 82 MMHG | WEIGHT: 196 LBS | OXYGEN SATURATION: 97 %

## 2020-07-20 DIAGNOSIS — L27.1 HAND FOOT SYNDROME: ICD-10-CM

## 2020-07-20 DIAGNOSIS — G89.3 CANCER ASSOCIATED PAIN: Primary | ICD-10-CM

## 2020-07-20 DIAGNOSIS — C50.811 MALIGNANT NEOPLASM OF OVERLAPPING SITES OF RIGHT BREAST IN FEMALE, ESTROGEN RECEPTOR NEGATIVE (HCC): ICD-10-CM

## 2020-07-20 DIAGNOSIS — Z17.1 MALIGNANT NEOPLASM OF OVERLAPPING SITES OF RIGHT BREAST IN FEMALE, ESTROGEN RECEPTOR NEGATIVE (HCC): ICD-10-CM

## 2020-07-20 DIAGNOSIS — R19.7 DIARRHEA, UNSPECIFIED TYPE: ICD-10-CM

## 2020-07-20 PROBLEM — K59.03 DRUG INDUCED CONSTIPATION: Status: RESOLVED | Noted: 2017-12-08 | Resolved: 2020-07-20

## 2020-07-20 PROBLEM — E83.52 HYPERCALCEMIA OF MALIGNANCY: Status: RESOLVED | Noted: 2018-02-06 | Resolved: 2020-07-20

## 2020-07-20 LAB
ALBUMIN SERPL-MCNC: 4 G/DL (ref 3.5–5.2)
ALBUMIN/GLOB SERPL: 1.9 G/DL
ALP SERPL-CCNC: 56 U/L (ref 39–117)
ALT SERPL W P-5'-P-CCNC: 39 U/L (ref 1–33)
ANION GAP SERPL CALCULATED.3IONS-SCNC: 9.2 MMOL/L (ref 5–15)
AST SERPL-CCNC: 35 U/L (ref 1–32)
BASOPHILS # BLD AUTO: 0.06 10*3/MM3 (ref 0–0.2)
BASOPHILS NFR BLD AUTO: 1.7 % (ref 0–1.5)
BILIRUB SERPL-MCNC: 0.5 MG/DL (ref 0–1.2)
BUN SERPL-MCNC: 27 MG/DL (ref 6–20)
BUN/CREAT SERPL: 31 (ref 7–25)
CALCIUM SPEC-SCNC: 9 MG/DL (ref 8.6–10.5)
CHLORIDE SERPL-SCNC: 106 MMOL/L (ref 98–107)
CO2 SERPL-SCNC: 28.8 MMOL/L (ref 22–29)
CREAT SERPL-MCNC: 0.87 MG/DL (ref 0.57–1)
DEPRECATED RDW RBC AUTO: 54.6 FL (ref 37–54)
EOSINOPHIL # BLD AUTO: 0.2 10*3/MM3 (ref 0–0.4)
EOSINOPHIL NFR BLD AUTO: 5.7 % (ref 0.3–6.2)
ERYTHROCYTE [DISTWIDTH] IN BLOOD BY AUTOMATED COUNT: 14.1 % (ref 12.3–15.4)
GFR SERPL CREATININE-BSD FRML MDRD: 67 ML/MIN/1.73
GLOBULIN UR ELPH-MCNC: 2.1 GM/DL
GLUCOSE SERPL-MCNC: 98 MG/DL (ref 65–99)
HCT VFR BLD AUTO: 37.5 % (ref 34–46.6)
HGB BLD-MCNC: 12.3 G/DL (ref 12–15.9)
IMM GRANULOCYTES # BLD AUTO: 0.01 10*3/MM3 (ref 0–0.05)
IMM GRANULOCYTES NFR BLD AUTO: 0.3 % (ref 0–0.5)
LYMPHOCYTES # BLD AUTO: 0.92 10*3/MM3 (ref 0.7–3.1)
LYMPHOCYTES NFR BLD AUTO: 26.3 % (ref 19.6–45.3)
MAGNESIUM SERPL-MCNC: 2.2 MG/DL (ref 1.6–2.6)
MCH RBC QN AUTO: 34.7 PG (ref 26.6–33)
MCHC RBC AUTO-ENTMCNC: 32.8 G/DL (ref 31.5–35.7)
MCV RBC AUTO: 105.9 FL (ref 79–97)
MONOCYTES # BLD AUTO: 0.38 10*3/MM3 (ref 0.1–0.9)
MONOCYTES NFR BLD AUTO: 10.9 % (ref 5–12)
NEUTROPHILS NFR BLD AUTO: 1.93 10*3/MM3 (ref 1.7–7)
NEUTROPHILS NFR BLD AUTO: 55.1 % (ref 42.7–76)
NRBC BLD AUTO-RTO: 0 /100 WBC (ref 0–0.2)
PHOSPHATE SERPL-MCNC: 3.3 MG/DL (ref 2.5–4.5)
PLAT MORPH BLD: NORMAL
PLATELET # BLD AUTO: 228 10*3/MM3 (ref 140–450)
PMV BLD AUTO: 10.8 FL (ref 6–12)
POTASSIUM SERPL-SCNC: 5.2 MMOL/L (ref 3.5–5.2)
PROT SERPL-MCNC: 6.1 G/DL (ref 6–8.5)
RBC # BLD AUTO: 3.54 10*6/MM3 (ref 3.77–5.28)
RBC MORPH BLD: NORMAL
SODIUM SERPL-SCNC: 144 MMOL/L (ref 136–145)
WBC # BLD AUTO: 3.5 10*3/MM3 (ref 3.4–10.8)
WBC MORPH BLD: NORMAL

## 2020-07-20 PROCEDURE — 36415 COLL VENOUS BLD VENIPUNCTURE: CPT

## 2020-07-20 PROCEDURE — 84100 ASSAY OF PHOSPHORUS: CPT | Performed by: INTERNAL MEDICINE

## 2020-07-20 PROCEDURE — 85007 BL SMEAR W/DIFF WBC COUNT: CPT | Performed by: INTERNAL MEDICINE

## 2020-07-20 PROCEDURE — 85025 COMPLETE CBC W/AUTO DIFF WBC: CPT | Performed by: INTERNAL MEDICINE

## 2020-07-20 PROCEDURE — 83735 ASSAY OF MAGNESIUM: CPT | Performed by: INTERNAL MEDICINE

## 2020-07-20 PROCEDURE — 80053 COMPREHEN METABOLIC PANEL: CPT | Performed by: INTERNAL MEDICINE

## 2020-07-20 PROCEDURE — 99213 OFFICE O/P EST LOW 20 MIN: CPT | Performed by: NURSE PRACTITIONER

## 2020-07-20 NOTE — PROGRESS NOTES
Subjective .     REASONS FOR FOLLOWUP:    1. Stage Ib ([id number]) right breast cancer: Diagnosed May 2010 with excisional biopsy for invasive ductal carcinoma, 1.3 cm, grade 2, ER 90%, SD 80%, HER-2/peter negative (1+ IHC).  Subsequent right mastectomy in July 2010 with no residual breast malignancy, 1/5 sentinel lymph node with micrometastasis (0.25 mm).  Treated in the [facility] system with adjuvant AC ×4 cycles in 2010 (no taxanes administered due to underlying Charcot-Saloni-Tooth with peripheral neuropathy).  Adjuvant Femara (postmenopausal) initiated October 2010 with plan to treat ×10 years.  Genetic testing reportedly negative.  Developed osteopenia treated with Prolia beginning 2/27/13.  2. Recurrent/metastatic disease identified 10/8/17 involving thoracic spine with cord compression at T6, lumbosacral involvement, sternal and right sternoclavicular involvement.  Femara discontinued.  Radiation administered (in the [facility] system) to the thoracic spine beginning 10/19/17 treating T3-T9 to a dose of 24 gray in 6 fractions.  3. Evaluation with MRI 12/8/17 showing persistent T6 cord compression with persistent neurologic compromise requiring surgical treatment 12/11/17 with T6 laminectomy/corpectomy and T3-T9 fusion.  Pathology with metastatic carcinoma of breast origin, ER negative, SD negative, HER-2/peter negative (1+ IHC).  4. Right pulmonary embolism 10/21/17 treated with Lovenox 1 mg/kg (100 mg) every 12 hours.  No evidence of DVT.  Lovenox held due to right gluteus hematoma 3/23/18 through 4/6/18.  Transitioned to oral Eliquis 5mg bid 1/28/19 (as of 2/25/19, patient still using previous supply of Lovenox).  5. Cancer related pain previously on Duragesic 50 µg patch every 72 hours and Dilaudid 4 mg as needed for breakthrough pain.  Narcotics subsequently discontinued with improvement in pain in January 2018.  6. Radiation therapy to L3 dural metastasis and left humerus initiated 1/15/18 (10 fractions),  completed 1/26/18  7. Monthly Xgeva initiated 1/23/18  8. Mild hypercalcemia of malignancy  9. Initiation of palliative oral single agent Xeloda 2/7/18 (2000 mg a.m., 1500 mg p.m. for 14/21 days).  10. 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  In reviewing prior CT scans, has been present for some time.  Monitor for now, if it enlarges consider surgical excision.  11. Cumulative side effects from Xeloda with fatigue, anorexia, diarrhea, increased tearing.  Alteration in dose/schedule with cycle 11 to 1500 mg twice a day for 7 days on followed by 7 days off.    HISTORY OF PRESENT ILLNESS:  The patient is a 59 y.o. year old female who is here for follow-up with the above-mentioned history.  The patient continues on Xeloda    History of Present Illness the patient returns today for follow-up continuing on Xeloda 1500 mg twice daily for 7 days on, 7 days off.  She is currently 5 days in the current cycle on therapy.  Overall she is tolerating treatment fairly well.  She does have grade 1 hand-foot syndrome with the hands more involved with some slight tightness to the hands, redness and mild cracking.  She has been working in the garden a lot and does have some peeling of her right hand which she attributes to using a garden tool without gloves on.  She also notes some intermittent low back pain which seems to be more related to working in the garden as well.  She is utilizing tramadol a few times a week.  She also continues on gabapentin 600 mg nightly and is sleeping well.    She continues with close follow-up with ophthalmology, seeing her eye doctor this past week.  She is continuing on Xiidra eye drops and was told by her eye doctor that she is at about 60% of normal currently.  She is not having any blurred vision.    She does note a couple  episodes of incontinence this past week since restarting Xeloda.  She of course has more loose stool when taking the pills.  She had stopped taking Imodium but after discussion it sounds like she may benefit from taking 1 Imodium on the days of Xeloda to try to help slow the bowels down.    Finally she is asking about buying a hot tub.  Her  wants to get one but she thought maybe that was not a good idea.  We did discuss that extreme temperatures including heat or cold can make hand-foot syndrome in relation to Xeloda worse.    She denies other concerns this time.    Past Medical History:   Diagnosis Date   • Acute pulmonary embolism, cancer-related 10/2017   • Allergic rhinitis    • Arthritis     Right knee; left shoulder   • Cancer (CMS/[facility]) 2010    Right breast   • Cancer (CMS/Formerly [facility]) 10/2017    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   • Charcot-Saloni-Tooth disease    • CPAP (continuous positive airway pressure) dependence    • GERD (gastroesophageal reflux disease)    • H/O Colon polyps    • H/O Uterine fibroid    • Heart murmur    • Hyperlipidemia    • Hypertension    • PONV (postoperative nausea and vomiting)      Past Surgical History:   Procedure Laterality Date   • BLADDER SURGERY      Bladder lift   • BREAST RECONSTRUCTION, BREAST TISSUE EXPANDER INSERTION Right    • BREAST SURGERY Right 2010    Mastectomy   •  SECTION  ,    • CHOLECYSTECTOMY     • COLONOSCOPY     • HERNIA REPAIR  2015    [facility]   • HYSTERECTOMY      Partial   • KNEE SURGERY Right    • LEG SURGERY Left     Broken   • MASTECTOMY Right    • AL TREAT TIBIAL SHAFT FX, INTRAMED IMPLANT Left 2017    Procedure: TIBIA INTRAMEDULLARY NAIL/DON INSERTION;  Surgeon: Romero Shanks MD;  Location: [facility];  Service: Orthopedics   • THORACIC DECOMPRESSION POSTERIOR FUSION WITH INSTRUMENTATION N/A 2017    Procedure: T6 costotransversectomy and decompression with T3 to  T9 posterior spinal fusion  with instrumentation with Jennifer Gonzalez and Nael Wilkes;  Surgeon: Quan Nayak MD;  Location: [facility] MAIN OR;  Service:        ONCOLOGIC HISTORY:  (History from previous dates can be found in the separate document.)    Current Outpatient Medications on File Prior to Visit   Medication Sig Dispense Refill   • acetaminophen (TYLENOL) 500 MG tablet Take 500 mg by mouth Every 6 (Six) Hours As Needed for Mild Pain .     • calcium carbonate (OS-CARY) 600 MG tablet Take 600 mg by mouth 2 (Two) Times a Day With Meals.     • capecitabine (XELODA) 500 MG chemo tablet Take 3 tabs (1500 mg) by mouth twice a day for 7 days on then 7 days off. 84 tablet 3   • cholecalciferol (VITAMIN D3) 1000 units tablet Take 1,000 Units by mouth Daily.     • diazePAM (VALIUM) 10 MG tablet Take 1 tablet by mouth Every 12 (Twelve) Hours As Needed for Anxiety. 10 tablet 1   • diphenoxylate-atropine (LOMOTIL) 2.5-0.025 MG per tablet Take 1 tablet by mouth 4 (Four) Times a Day As Needed for Diarrhea. 60 tablet 0   • ELIQUIS 5 MG tablet tablet TAKE 1 TABLET BY MOUTH EVERY 12 HOURS 60 tablet 6   • FLONASE ALLERGY RELIEF 50 MCG/ACT nasal spray 2 sprays into each nostril daily.  11   • gabapentin (NEURONTIN) 300 MG capsule Take 1 capsule by mouth Daily. 10 capsule 1   • gabapentin (Neurontin) 300 MG capsule Take 1 capsule by mouth 2 (Two) Times a Day. 180 capsule 1   • ketoconazole (NIZORAL) 2 % cream Apply  topically to the appropriate area as directed Daily. 15 g 0   • losartan (COZAAR) 50 MG tablet Take 1 tablet by mouth Daily. 90 tablet 2   • mesalamine (LIALDA) 1.2 g EC tablet TAKE 1 TABLET BY MOUTH TWICE DAILY 180 tablet 0   • metaxalone (SKELAXIN) 800 MG tablet Take 1 tablet by mouth 3 (Three) Times a Day As Needed for Muscle Spasms. 30 tablet 3   • mupirocin (BACTROBAN) 2 % ointment JUANITA TO SURGICAL SITE AND COVER WITH BAND AID D UNTIL HEALED     • omeprazole (PriLOSEC) 40 MG capsule TAKE 1 CAPSULE DAILY 90 capsule 2   • ondansetron ODT  (ZOFRAN-ODT) 8 MG disintegrating tablet Take 1 tablet by mouth Every 8 (Eight) Hours As Needed for Nausea or Vomiting. 30 tablet 1   • phenazopyridine (PYRIDIUM) 200 MG tablet Take 200 mg by mouth 3 (Three) Times a Day As Needed for bladder spasms.     • prochlorperazine (COMPAZINE) 10 MG tablet Take 1 tablet by mouth Every 6 (Six) Hours As Needed for Nausea or Vomiting. 30 tablet 0   • sennosides-docusate sodium (SENOKOT-S) 8.6-50 MG tablet Take 2 tablets by mouth 2 (Two) Times a Day. 90 tablet 2   • sucralfate (CARAFATE) 1 g tablet Take 1 tablet by mouth 4 (Four) Times a Day. 30 tablet 2   • temazepam (RESTORIL) 15 MG capsule Take 1 capsule by mouth At Night As Needed for Sleep. 30 capsule 1   • traMADol (ULTRAM) 50 MG tablet Take 1 tablet by mouth Every 8 (Eight) Hours As Needed for Moderate Pain . 90 tablet 1   • triamcinolone (KENALOG) 0.1 % cream Apply  topically to the appropriate area as directed 2 (Two) Times a Day. 15 g 0   • trimethoprim (TRIMPEX) 100 MG tablet Take 100 mg by mouth Daily.     • XIIDRA 5 % ophthalmic solution INSTILL 1 DROP INTO EACH EYE BID.       No current facility-administered medications on file prior to visit.        ALLERGIES:     Allergies   Allergen Reactions   • Hydrocodone Nausea Only   • Morphine And Related Nausea And Vomiting     Social History     Socioeconomic History   • Marital status:      Spouse name: Murray   • Number of children: 3   • Years of education: College   • Highest education level: Not on file   Occupational History     Employer: [employer]     Employer: RETIRED   Tobacco Use   • Smoking status: Never Smoker   • Smokeless tobacco: Never Used   Substance and Sexual Activity   • Alcohol use: Yes     Comment: social   • Drug use: No   • Sexual activity: Defer     Family History   Problem Relation Age of Onset   • Heart disease Mother    • Hyperlipidemia Mother    • Hypertension Mother    • Diabetes Father    • Charcot-Saloni-Tooth disease Father    •  "Heart disease Father    • Hypertension Father    • Heart failure Father    • Diabetes Sister    • Heart disease Sister    • Hypertension Sister    • Heart disease Maternal Aunt    • Scoliosis Maternal Aunt    • Heart disease Maternal Uncle    • Diabetes Maternal Grandmother    • Heart disease Maternal Grandmother    • Hypertension Maternal Grandmother    • Uterine cancer Maternal Grandmother    • Heart disease Maternal Grandfather      Review of Systems   Constitutional: Positive for fatigue. Negative for activity change, appetite change, fever and unexpected weight change.   HENT: Negative for congestion, mouth sores, nosebleeds, sore throat and voice change.    Eyes: Positive for itching and visual disturbance. Negative for discharge.   Respiratory: Negative for cough, shortness of breath and wheezing.    Cardiovascular: Negative for chest pain, palpitations and leg swelling.   Gastrointestinal: Positive for diarrhea. Negative for abdominal distention, abdominal pain, blood in stool, constipation, nausea and vomiting.   Endocrine: Negative for cold intolerance and heat intolerance.   Genitourinary: Negative for difficulty urinating, dysuria, flank pain, frequency, hematuria and urgency.   Musculoskeletal: Negative for arthralgias, back pain, joint swelling and myalgias.   Skin: Positive for color change. Negative for rash and wound.   Neurological: Negative for dizziness, syncope, weakness, light-headedness, numbness and headaches.   Hematological: Negative for adenopathy. Does not bruise/bleed easily.   Psychiatric/Behavioral: Negative for confusion and sleep disturbance. The patient is not nervous/anxious.          Objective    Vitals:    07/20/20 1348   BP: 146/82   Pulse: 102   Resp: 16   Temp: 97.9 °F (36.6 °C)   TempSrc: Oral   SpO2: 97%   Weight: 88.9 kg (196 lb)   Height: 154.9 cm (60.98\")   PainSc: 0-No pain      Current Status 7/20/2020   ECOG score 0       Physical Exam   Constitutional: She is oriented " to person, place, and time. She appears well-developed and well-nourished.   Ambulating with cane   HENT:   Mouth/Throat: Oropharynx is clear and moist.   Eyes: Conjunctivae are normal.   Neck: No thyromegaly present.   Cardiovascular: Normal rate and regular rhythm. Exam reveals no gallop and no friction rub.   No murmur heard.  Pulmonary/Chest: Breath sounds normal. No respiratory distress.   Abdominal: Soft. Bowel sounds are normal. She exhibits no distension. There is no tenderness.   Musculoskeletal: She exhibits no edema.   Lower extremity braces in place.  No significant lower extremity edema   Lymphadenopathy:        Head (right side): No submandibular adenopathy present.     She has no cervical adenopathy.     She has no axillary adenopathy.        Right: No inguinal and no supraclavicular adenopathy present.        Left: No inguinal and no supraclavicular adenopathy present.   Neurological: She is alert and oriented to person, place, and time. She displays normal reflexes. No cranial nerve deficit. She exhibits normal muscle tone.   Bilateral lower extremity strength, 4+/5   Skin: Skin is warm and dry. No rash noted. There is erythema.   Mild erythema involving the palms of the hands.  Slight skin cracking in the hands. Fingers/hands slightly swollen with skin tight. Peeling on right palm - see HPI   Psychiatric: She has a normal mood and affect. Her behavior is normal.     RECENT LABS:  Results from last 7 days   Lab Units 07/20/20  1342   WBC 10*3/mm3 3.50   NEUTROS ABS 10*3/mm3 1.93   HEMOGLOBIN g/dL 12.3   HEMATOCRIT % 37.5   PLATELETS 10*3/mm3 228     Results from last 7 days   Lab Units 07/20/20  1342   SODIUM mmol/L 144   POTASSIUM mmol/L 5.2   CHLORIDE mmol/L 106   CO2 mmol/L 28.8   BUN mg/dL 27*   CREATININE mg/dL 0.87   CALCIUM mg/dL 9.0   ALBUMIN g/dL 4.00   BILIRUBIN mg/dL 0.5   ALK PHOS U/L 56   ALT (SGPT) U/L 39*   AST (SGOT) U/L 35*   GLUCOSE mg/dL 98   MAGNESIUM mg/dL 2.2            Assessment/Plan      1. Previous Stage Ib ([id number]) right breast cancer:  · Diagnosed May 2010 with excisional biopsy for invasive ductal carcinoma, 1.3 cm, grade 2, ER 90%, AK 80%, HER-2/peter negative (1+ IHC).    · Subsequent right mastectomy in July 2010 with no residual breast malignancy, 1/5 sentinel lymph node with micrometastasis (0.25 mm).    · Treated in the [facility] system with adjuvant AC ×4 cycles in 2010 (no taxanes administered due to underlying Charcot-Saloni-Tooth with peripheral neuropathy).    · Adjuvant Femara (postmenopausal) initiated October 2010 with plan to treat ×10 years.    · Genetic testing reportedly negative.    · Developed osteopenia treated with Prolia beginning 2/27/13. Subsequently discontinued due to identification of metastatic disease.  2. Recurrent/metastatic disease identified 10/8/17:  · Disease involving thoracic spine with cord compression at T6, lumbosacral involvement, sternal and right sternoclavicular involvement.    · Femara discontinued in 10/2017.    · Radiation administered (in the [facility] system) to the thoracic spine beginning 10/19/17 treating T3-T9 to a dose of 24 gray in 6 fractions.  · Evaluation with MRI 12/8/17 showing persistent T6 cord compression with persistent neurologic compromise requiring surgical treatment 12/11/17 with T6 laminectomy/corpectomy and T3-T9 fusion.  Pathology with metastatic carcinoma of breast origin, ER negative, AK negative, HER-2/peter negative (1+ IHC).  · Additional staging evaluation 12/8/17 with no evidence of visceral metastatic disease, bone scan showing involvement of thoracic spine, sternum, left humerus, mid frontal bone.  No plane film correlate of left humerus lesion.  MRI lumbar spine with small intradural L3 metastasis.  CA 15-3 12/6/17- 17.  · Palliative radiation therapy to L3 dural metastasis and left humerus initiated 1/15/18 (10 fractions), completed 1/26/18.  · Hypercalcemia of malignancy with calcium in the  10-11 range.  · Initiation of monthly Xgeva 1/23/18.  · Baseline CT scan 1/30/18 with no evidence of visceral involvement.  Cluster of nodular opacities in the right lower lobe suspected to be infectious or related to bronchiolitis. Bone scan 1/30/18 showed postsurgical change in the thoracic spine, stable uptake in the frontal bone, no new areas of disease.  · Initiation of palliative oral single agent Xeloda 2/7/18 2000 mg a.m., 1500 mg p.m. for 14/21 days.   · Following 3 cycles xeloda, bone scan 4/4/18 showed no change from the prior study.  CT scan 4/4/18 showed a small pericardial effusion of unclear significance as well as a subcutaneous nodule in the right lateral chest wall.  Subsequent evaluation with echocardiogram 4/17/18 showed no evidence of pericardial effusion.  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  We discussed the possibility of surgical excision of the right subcutaneous chest wall lesion for more definitive diagnosis.  Reviewed previous CT images dating back to 12/8/17 and the lesion was present even at that time measuring around 1.7 cm although not commented on in the radiology report.  As this appears to be an indolent low-grade process unrelated to her breast cancer, recommendee foregoing surgical excision at this time and monitoring this area on future scans.  The patient agreed.    · Following 6 cycles of Xeloda, CT 6/6/18  showed stable findings, no evidence of progressive disease.  There was a comment regarding subcutaneous abnormality in the anterior abdominal wall and this was related to Lovenox injection sites.  Bone scan 6/6/18 showed no interval change.   · CT scan and bone scan 8/13/18 following 9 cycles of Xeloda showed stable findings with no evidence of significant visceral metastases.  Her bone lesions appear stable on bone scan.   The spindle cell neoplasm in her right chest wall actually decreased in size from 2 cm down to 1.6 cm.    · The patient experienced some symptoms of diarrhea, anorexia, generalized weakness during cycle 9 Xeloda it was unclear whether this was related to a viral gastroenteritis or toxicity from treatment.  Symptoms recurred during cycle 10 and treatment was cut short by 2 days.  Symptoms attributed to Xeloda.  With cycle 11, dose and schedule altered to 1500 mg twice daily for 7 days on followed by 7 days off .  · Most recent 3-month interval scans with bone scan 6/5/2020 showing no changes.  CT scan 6/5/2020 with no significant changes..    · Patient continuing today on Xeloda 1500 mg twice daily for 7 days on, 7 days off.  She does have some mild erythema, swelling and tightness of the hands, lesser involvement of the feet.  She has peeling of the right hand though she attributes this to working in the garden without gloves on.  Symptoms are stable and manageable overall.  She did experience 2 episodes of stool incontinence this past week and after discussion it sounds like she would benefit from probably taking Imodium at least once daily on the day she takes Xeloda to try to slow down bowel transit.  Otherwise she is due today for Xgeva and will proceed with treatment.  She will return in 4 weeks for follow-up with Dr. Hancock.  In 7 weeks we plan to repeat CT scans and bone scan and then we will see her again in 8 weeks for follow-up.  She is aware to contact us with any new issues in the interval.  3. Right pulmonary embolism:  · Diagnosed on CT angiogram 10/21/17 involving small right lower lobe pulmonary artery.  Lower extremity Dopplers negative.  · Bilateral lower extremity Dopplers negative again 12/5/17.  · Received chronic Lovenox 1 mg/kg twice per day, transition to oral Eliquis in February 2019, continuing on Eliquis 5 mg twice daily.  4. Cancer related pain:  · Previously receiving Duragesic 50 µg  patch every 72 hours along with Dilaudid 4 mg as needed for breakthrough pain  · The patient's pain improved over time and she was able to discontinue both Duragesic and Dilaudid in the interval.  · Patient does take occasional Flexeril at bedtime due to back spasm/pain when she has been more active.  · Patient reportedly at this time is taking tramadol roughly twice a week, usually after increased activity such as working in the garden which she reports doing more this summer.  5. Chemotherapy-induced diarrhea with subsequent C. difficile colitis in the setting of previous ulcerative colitis:  · Patient with reported history of ulcerative colitis, is not on active therapy.  · The patient developed initial diarrhea related to Xeloda at regular dosing.  · Symptoms improved on reduced dose Xeloda  · Flare of symptoms in October 2018 with apparent finding of C. difficile colitis by GI, treated with course of oral vancomycin with improvement in symptoms.  · Patient notes 2 episodes of bowel incontinence after restarting her week on of Xeloda.  After further discussion she had stopped Imodium during her off week and did not restart.  It does sound like she would benefit from taking at least 1 Imodium daily when taking Xeloda to help slow down bowel transit.  Overall this is now improved.  6. Traumatic left tibia/fibular fracture:  · Status post ORIF 12/6/17  · Specimen was sent for pathologic review, negative for evidence of malignancy  7. Hypercalcemia:  · Suspect hypercalcemia of malignancy, calcium in  10-11 range previously.  · Calcium normalized following initiation of monthly Xgeva on 1/23/18.  · Calcium remains normal today at 9.0  8. Chemotherapy-induced mucositis:  · Patient had a minimal degree of mucositis with cycle 2.  The patient has magic mouthwash to use as needed.  No subsequent mucositis.  · This was not a concern today.  9. Recurrent UTI, bladder wall thickening on CT:  · Patient had an enterococcal  UTI on 3/2/18 sensitive to nitrofurantoin and received treatment, unclear how long.  · Recurrent UTI 3/20/18 with urine culture growing Klebsiella, initially treated with nitrofurantoin, transitioned to Levaquin.  · CT 4/4/18 with diffuse bladder wall thickening with increased nodular thickening at the left base.  Referral to urogynecology Dr. May Johnson.  She was placed on a prophylactic dose of trimethoprim 100 mg daily, bladder wall thickening felt to be related to recent recurrent urinary tract infections.  · Patient with urinary symptoms, treated with course of Macrobid at the end of December 2018, urine culture however was negative 12/31/18.  · Patient was found to have Klebsiella UTI 7/29/2019 which was successfully treated with Macrobid with complete resolution of symptoms.  10.   Mobility:  · The patient underwent an intensive course of [facility].  She graduated from her outpatient course November 2018.  · Overall the patient has improved dramatically in terms of mobility, utilizing a cane with good ambulation.     11.  Depression:  · The patient weaned herself off of Cymbalta and reports that her symptoms remain stable.  12. 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  Symptoms did improve and she continued on the same dose.    · Symptoms in the hands are currently stable with mild skin cracking.  Patient will continue to use emollient cream frequently and has been recommended to wear cotton gloves at night.  13. Evidence of steatosis on scans with mild intermittent elevated liver function studies:  · Liver function studies increased 8/20/19 with ALT 98, AST 70, normal total bilirubin.  · Negative viral hepatitis A, B, and C panel 8/23/18  · Likely related to hepatic steatosis.   · Subsequent improvement in  LFTs  · Today, ALT is 39, AST 35, borderline elevated though stable.  Most recent CT scan showed radiographic evidence of steatosis which is the cause of her mild elevation in transaminases.  We will monitor every 4 weeks.  14. Chemotherapy induced leukopenia:  · WBC today 3.50 with normal differential  15. GERD:  · The patient continues on omeprazole 40 mg daily (2 x 20 mg) with improved symptoms.    16. Insomnia:  · Patient with prior paradoxical reaction to Benadryl  · Improved previously on temazepam 15 mg nightly as needed.   · Patient noted subsequently that temazepam was having no effect.  She did increase gabapentin dosing to 600 mg nightly and this has helped.   17. Health maintenance:  · Patient notes that she has a history of colon polyps as well as ulcerative colitis and was due for a follow-up colonoscopy on 9/12/2019.  We did discuss there is no necessity to pursue colonoscopy in the setting of her metastatic breast cancer.    · The patient did undergo colonoscopy on 2/7/2020 with findings of muscular hypertrophy and diverticulosis.   18. Right shoulder pain:  · Patient was evaluated by Dr Forrester.  She has experienced difficulty over the past year and a half with her right shoulder although she has not complained of this in prior visits to our office.  She reports difficulty with abduction.    · The patient did undergo MRI of her right shoulder on 11/21/2019 at an outside facility showing multiple abnormalities including supraspinatus tendinosis, labral tear but no evidence of metastatic disease.  · Symptoms currently improved/resolved.  19. Ocular changes in part related to Xeloda:  · Patient with recent mild degree of blurred vision and also with some burning and pruritus.  · She was seen by her ophthalmologist recently and has been continuing on xiidra ophthalmic drops which have helped.  · Likely both issues are to some extent related to Xeloda.  · Patient continues follow-up with ophthalmology,  seeing her eye doctor this past week.  She is felt to be at about 60% of her baseline and will continue on eyedrops as prescribed for now.    Plan:  1. Continue oral Xeloda 1500 mg twice a day 7 days on followed by 7 days off.  2. Monthly Xgeva today  3. Patient to utilize Imodium as discussed to help with diarrhea in relation to Xeloda.  4. Continue Eliquis 5 mg twice daily.  5. Continue use of emollient cream on the hands and feet and continue to wear socks and gloves at night  6. Continue omeprazole 40 mg daily.    7. Continue xiidra ophthalmic drops prescribed by ophthalmology  8. In 4 weeks MD visit with CBC, CMP, magnesium, phosphorus and monthly Xgeva  9. In 7 weeks CT chest abdomen pelvis and bone scan  10. In 8 weeks MD visit with CBC, CMP, magnesium, phosphorus and patient will be due for Xgeva    Patient continuing on high risk medication requiring intensive monitoring.

## 2020-07-21 ENCOUNTER — MEDICATION THERAPY MANAGEMENT (OUTPATIENT)
Dept: PHARMACY | Facility: HOSPITAL | Age: 60
End: 2020-07-21

## 2020-07-21 NOTE — PROGRESS NOTES
[facility] Lab Review- Capecitabine        7/20/2020   WBC 3.40 - 10.80 10*3/mm3 3.50   Neutrophils Absolute 1.70 - 7.00 10*3/mm3 1.93   Hemoglobin 12.0 - 15.9 g/dL 12.3   Hematocrit 34.0 - 46.6 % 37.5   Platelets 140 - 450 10*3/mm3 228   Creatinine 0.57 - 1.00 mg/dL 0.87   eGFR Non African Am >60 mL/min/1.73 67   BUN 6 - 20 mg/dL 27 (A)   Sodium 136 - 145 mmol/L 144   Potassium 3.5 - 5.2 mmol/L 5.2   Glucose 65 - 99 mg/dL 98   Magnesium 1.6 - 2.6 mg/dL 2.2   Calcium 8.6 - 10.5 mg/dL 9.0   Albumin 3.50 - 5.20 g/dL 4.00   Total Protein 6.0 - 8.5 g/dL 6.1   AST (SGOT) 1 - 32 U/L 35 (A)   ALT (SGPT) 1 - 33 U/L 39 (A)   Alkaline Phosphatase 39 - 117 U/L 56   Total Bilirubin 0.0 - 1.2 mg/dL 0.5     APRN dictation noted. Pharmacy will continue to follow.    Thanks,  Lubna Gupta, PharmD

## 2020-07-24 RX ORDER — CAPECITABINE 500 MG/1
TABLET, FILM COATED ORAL
Qty: 84 TABLET | Refills: 3 | Status: SHIPPED | OUTPATIENT
Start: 2020-07-24 | End: 2020-11-16 | Stop reason: SDUPTHER

## 2020-07-24 NOTE — TELEPHONE ENCOUNTER
I have been notified by Jean Paul at [facility] that pt is out of refills on her Xeloda. Per last office notes-pt is to continue. I have escribed a new rx to [facility].

## 2020-07-27 ENCOUNTER — SPECIALTY PHARMACY (OUTPATIENT)
Dept: PHARMACY | Facility: HOSPITAL | Age: 60
End: 2020-07-27

## 2020-08-06 ENCOUNTER — MEDICATION THERAPY MANAGEMENT (OUTPATIENT)
Dept: PHARMACY | Facility: HOSPITAL | Age: 60
End: 2020-08-06

## 2020-08-06 ENCOUNTER — TELEPHONE (OUTPATIENT)
Dept: ONCOLOGY | Facility: HOSPITAL | Age: 60
End: 2020-08-06

## 2020-08-06 ENCOUNTER — TELEPHONE (OUTPATIENT)
Dept: ONCOLOGY | Facility: CLINIC | Age: 60
End: 2020-08-06

## 2020-08-06 RX ORDER — TEMAZEPAM 15 MG/1
15 CAPSULE ORAL NIGHTLY PRN
Qty: 30 CAPSULE | Refills: 1 | Status: SHIPPED | OUTPATIENT
Start: 2020-08-06 | End: 2020-09-04 | Stop reason: SDUPTHER

## 2020-08-06 NOTE — TELEPHONE ENCOUNTER
In basket message to Dr Hancock to see if we can refill her Restoril.   LM asking patient to return call, due to test INR this past Monday.

## 2020-08-06 NOTE — TELEPHONE ENCOUNTER
----- Message from Ubaldo Hancock Jr., MD sent at 8/6/2020  4:00 PM EDT -----  That is ok  ----- Message -----  From: Kelly Louis RN  Sent: 8/6/2020   3:10 PM EDT  To: Ubaldo Hancock Jr., MD    You put in your notes the she is no longer taking Temazepam 15 mg because it is not working. She called for a refill and we didn't see on list so did not refill. She called because she is still taking with Neurontin at bedtime and wants a refill. Is that ok with you?  Amanda

## 2020-08-06 NOTE — PROGRESS NOTES
MTM telephone follow up re adherence and side effects- Capecitabine    Martha is doing well today. She has no new issues with her capecitabine. HFS has stayed about the same and she continues to use moisturizer and dons socks and gloves at nighttime. Medication administration and adherence seem appropriate; she admits to missing one dose this past month. Martha denies any recent medication changes. She will call if any issues arise. Pharmacy will continue to follow.    Thanks,  Lubna Gupta, PharmD

## 2020-08-11 RX ORDER — LOSARTAN POTASSIUM 50 MG/1
50 TABLET ORAL DAILY
Qty: 90 TABLET | Refills: 2 | Status: SHIPPED | OUTPATIENT
Start: 2020-08-11 | End: 2020-11-04 | Stop reason: SDUPTHER

## 2020-08-17 ENCOUNTER — INFUSION (OUTPATIENT)
Dept: ONCOLOGY | Facility: HOSPITAL | Age: 60
End: 2020-08-17

## 2020-08-17 ENCOUNTER — OFFICE VISIT (OUTPATIENT)
Dept: ONCOLOGY | Facility: CLINIC | Age: 60
End: 2020-08-17

## 2020-08-17 ENCOUNTER — LAB (OUTPATIENT)
Dept: OTHER | Facility: HOSPITAL | Age: 60
End: 2020-08-17

## 2020-08-17 ENCOUNTER — SPECIALTY PHARMACY (OUTPATIENT)
Dept: ONCOLOGY | Facility: CLINIC | Age: 60
End: 2020-08-17

## 2020-08-17 VITALS
HEART RATE: 90 BPM | WEIGHT: 193.6 LBS | BODY MASS INDEX: 36.55 KG/M2 | OXYGEN SATURATION: 95 % | RESPIRATION RATE: 16 BRPM | TEMPERATURE: 96.9 F | DIASTOLIC BLOOD PRESSURE: 84 MMHG | HEIGHT: 61 IN | SYSTOLIC BLOOD PRESSURE: 137 MMHG

## 2020-08-17 DIAGNOSIS — Z17.1 MALIGNANT NEOPLASM OF OVERLAPPING SITES OF RIGHT BREAST IN FEMALE, ESTROGEN RECEPTOR NEGATIVE (HCC): Primary | ICD-10-CM

## 2020-08-17 DIAGNOSIS — Z17.1 MALIGNANT NEOPLASM OF OVERLAPPING SITES OF RIGHT BREAST IN FEMALE, ESTROGEN RECEPTOR NEGATIVE (HCC): ICD-10-CM

## 2020-08-17 DIAGNOSIS — C50.811 MALIGNANT NEOPLASM OF OVERLAPPING SITES OF RIGHT BREAST IN FEMALE, ESTROGEN RECEPTOR NEGATIVE (HCC): Primary | ICD-10-CM

## 2020-08-17 DIAGNOSIS — C50.811 MALIGNANT NEOPLASM OF OVERLAPPING SITES OF RIGHT BREAST IN FEMALE, ESTROGEN RECEPTOR NEGATIVE (HCC): ICD-10-CM

## 2020-08-17 DIAGNOSIS — C79.51 BONE METASTASES: ICD-10-CM

## 2020-08-17 LAB
ALBUMIN SERPL-MCNC: 4.3 G/DL (ref 3.5–5.2)
ALBUMIN/GLOB SERPL: 2.2 G/DL
ALP SERPL-CCNC: 56 U/L (ref 39–117)
ALT SERPL W P-5'-P-CCNC: 39 U/L (ref 1–33)
ANION GAP SERPL CALCULATED.3IONS-SCNC: 9.6 MMOL/L (ref 5–15)
AST SERPL-CCNC: 36 U/L (ref 1–32)
BASOPHILS # BLD AUTO: 0.04 10*3/MM3 (ref 0–0.2)
BASOPHILS NFR BLD AUTO: 0.9 % (ref 0–1.5)
BILIRUB SERPL-MCNC: 0.7 MG/DL (ref 0–1.2)
BUN SERPL-MCNC: 19 MG/DL (ref 6–20)
BUN/CREAT SERPL: 19.2 (ref 7–25)
CALCIUM SPEC-SCNC: 9.2 MG/DL (ref 8.6–10.5)
CHLORIDE SERPL-SCNC: 106 MMOL/L (ref 98–107)
CO2 SERPL-SCNC: 27.4 MMOL/L (ref 22–29)
CREAT SERPL-MCNC: 0.99 MG/DL (ref 0.57–1)
DEPRECATED RDW RBC AUTO: 54.7 FL (ref 37–54)
EOSINOPHIL # BLD AUTO: 0.27 10*3/MM3 (ref 0–0.4)
EOSINOPHIL NFR BLD AUTO: 6.4 % (ref 0.3–6.2)
ERYTHROCYTE [DISTWIDTH] IN BLOOD BY AUTOMATED COUNT: 14.5 % (ref 12.3–15.4)
GFR SERPL CREATININE-BSD FRML MDRD: 57 ML/MIN/1.73
GLOBULIN UR ELPH-MCNC: 2 GM/DL
GLUCOSE SERPL-MCNC: 104 MG/DL (ref 65–99)
HCT VFR BLD AUTO: 37.6 % (ref 34–46.6)
HGB BLD-MCNC: 12.6 G/DL (ref 12–15.9)
IMM GRANULOCYTES # BLD AUTO: 0.01 10*3/MM3 (ref 0–0.05)
IMM GRANULOCYTES NFR BLD AUTO: 0.2 % (ref 0–0.5)
LYMPHOCYTES # BLD AUTO: 1.06 10*3/MM3 (ref 0.7–3.1)
LYMPHOCYTES NFR BLD AUTO: 25.1 % (ref 19.6–45.3)
MAGNESIUM SERPL-MCNC: 2.1 MG/DL (ref 1.6–2.6)
MCH RBC QN AUTO: 34.5 PG (ref 26.6–33)
MCHC RBC AUTO-ENTMCNC: 33.5 G/DL (ref 31.5–35.7)
MCV RBC AUTO: 103 FL (ref 79–97)
MONOCYTES # BLD AUTO: 0.37 10*3/MM3 (ref 0.1–0.9)
MONOCYTES NFR BLD AUTO: 8.8 % (ref 5–12)
NEUTROPHILS NFR BLD AUTO: 2.47 10*3/MM3 (ref 1.7–7)
NEUTROPHILS NFR BLD AUTO: 58.6 % (ref 42.7–76)
NRBC BLD AUTO-RTO: 0 /100 WBC (ref 0–0.2)
PHOSPHATE SERPL-MCNC: 3.6 MG/DL (ref 2.5–4.5)
PLATELET # BLD AUTO: 192 10*3/MM3 (ref 140–450)
PMV BLD AUTO: 11 FL (ref 6–12)
POTASSIUM SERPL-SCNC: 5.1 MMOL/L (ref 3.5–5.2)
PROT SERPL-MCNC: 6.3 G/DL (ref 6–8.5)
RBC # BLD AUTO: 3.65 10*6/MM3 (ref 3.77–5.28)
SODIUM SERPL-SCNC: 143 MMOL/L (ref 136–145)
WBC # BLD AUTO: 4.22 10*3/MM3 (ref 3.4–10.8)

## 2020-08-17 PROCEDURE — 25010000002 DENOSUMAB 120 MG/1.7ML SOLUTION

## 2020-08-17 PROCEDURE — 85025 COMPLETE CBC W/AUTO DIFF WBC: CPT | Performed by: INTERNAL MEDICINE

## 2020-08-17 PROCEDURE — 83735 ASSAY OF MAGNESIUM: CPT | Performed by: INTERNAL MEDICINE

## 2020-08-17 PROCEDURE — 84100 ASSAY OF PHOSPHORUS: CPT | Performed by: INTERNAL MEDICINE

## 2020-08-17 PROCEDURE — 96372 THER/PROPH/DIAG INJ SC/IM: CPT

## 2020-08-17 PROCEDURE — 80053 COMPREHEN METABOLIC PANEL: CPT | Performed by: INTERNAL MEDICINE

## 2020-08-17 PROCEDURE — 36415 COLL VENOUS BLD VENIPUNCTURE: CPT

## 2020-08-17 PROCEDURE — 99214 OFFICE O/P EST MOD 30 MIN: CPT | Performed by: INTERNAL MEDICINE

## 2020-08-17 RX ORDER — APIXABAN 5 MG/1
TABLET, FILM COATED ORAL
Qty: 60 TABLET | Refills: 6 | Status: SHIPPED | OUTPATIENT
Start: 2020-08-17 | End: 2021-01-04 | Stop reason: SDUPTHER

## 2020-08-17 RX ADMIN — DENOSUMAB 120 MG: 120 INJECTION SUBCUTANEOUS at 13:31

## 2020-08-17 NOTE — PROGRESS NOTES
MTM in person follow up- Capecitabine    Martha is doing well today. She continues to have some HFS and she uses CBD balm from a local store that has helped. Matrha reports that her mother is currently living with her while her home is undergoing renovation. This has caused her some stress and she admits that her routine has been a bit off and she missed one evening dose of her medication. She took the missed dose the following morning when she was supposed to be off. She denies any medication changes. I will follow up on labs and MD dictation.     Thanks,  Lubna Gupta, DimitriosD

## 2020-08-18 ENCOUNTER — MEDICATION THERAPY MANAGEMENT (OUTPATIENT)
Dept: PHARMACY | Facility: HOSPITAL | Age: 60
End: 2020-08-18

## 2020-08-18 NOTE — PROGRESS NOTES
Subjective .     REASONS FOR FOLLOWUP:    1. Stage Ib ([id number]) right breast cancer: Diagnosed May 2010 with excisional biopsy for invasive ductal carcinoma, 1.3 cm, grade 2, ER 90%, FL 80%, HER-2/peter negative (1+ IHC).  Subsequent right mastectomy in July 2010 with no residual breast malignancy, 1/5 sentinel lymph node with micrometastasis (0.25 mm).  Treated in the [facility] system with adjuvant AC ×4 cycles in 2010 (no taxanes administered due to underlying Charcot-Saloni-Tooth with peripheral neuropathy).  Adjuvant Femara (postmenopausal) initiated October 2010 with plan to treat ×10 years.  Genetic testing reportedly negative.  Developed osteopenia treated with Prolia beginning 2/27/13.  2. Recurrent/metastatic disease identified 10/8/17 involving thoracic spine with cord compression at T6, lumbosacral involvement, sternal and right sternoclavicular involvement.  Femara discontinued.  Radiation administered (in the [facility] system) to the thoracic spine beginning 10/19/17 treating T3-T9 to a dose of 24 gray in 6 fractions.  3. Evaluation with MRI 12/8/17 showing persistent T6 cord compression with persistent neurologic compromise requiring surgical treatment 12/11/17 with T6 laminectomy/corpectomy and T3-T9 fusion.  Pathology with metastatic carcinoma of breast origin, ER negative, FL negative, HER-2/peter negative (1+ IHC).  4. Right pulmonary embolism 10/21/17 treated with Lovenox 1 mg/kg (100 mg) every 12 hours.  No evidence of DVT.  Lovenox held due to right gluteus hematoma 3/23/18 through 4/6/18.  Transitioned to oral Eliquis 5mg bid 1/28/19 (as of 2/25/19, patient still using previous supply of Lovenox).  5. Cancer related pain previously on Duragesic 50 µg patch every 72 hours and Dilaudid 4 mg as needed for breakthrough pain.  Narcotics subsequently discontinued with improvement in pain in January 2018.  6. Radiation therapy to L3 dural metastasis and left humerus initiated 1/15/18 (10 fractions),  completed 1/26/18  7. Monthly Xgeva initiated 1/23/18  8. Mild hypercalcemia of malignancy  9. Initiation of palliative oral single agent Xeloda 2/7/18 (2000 mg a.m., 1500 mg p.m. for 14/21 days).  10. 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  In reviewing prior CT scans, has been present for some time.  Monitor for now, if it enlarges consider surgical excision.  11. Cumulative side effects from Xeloda with fatigue, anorexia, diarrhea, increased tearing.  Alteration in dose/schedule with cycle 11 to 1500 mg twice a day for 7 days on followed by 7 days off.    HISTORY OF PRESENT ILLNESS:  The patient is a 59 y.o. year old female who is here for follow-up with the above-mentioned history.  The patient continues on Xeloda    History of Present Illness the patient returns today in follow-up continuing on oral [id number] mg twice daily 7 days on followed by 7 days off and also continuing on monthly Xgeva that is due today with laboratory studies to review.  The patient reports that she has 1 day left on her current week of Xeloda.  She continues to tolerate this reasonably well.  Her son is moving out of their house and she has been moving many boxes and reports that she has not been as vigilant about using her emollient cream on her hands and has had some increase in scaling and flaking.  She is continuing to wear cotton gloves at night.  She reports that her feet are in better shape than her hands.  She is ambulating without significant difficulty, using a cane as usual.  She denies any mucositis.  She reports some occasional loose stool.  She does continue on anticoagulation with Eliquis and has not experienced any bleeding issues.  She notes that her appetite has been somewhat reduced recently and she has lost 5 pounds.  She denies any new  musculoskeletal pain.    Past Medical History:   Diagnosis Date   • Acute pulmonary embolism, cancer-related 10/2017   • Allergic rhinitis    • Arthritis     Right knee; left shoulder   • Cancer (CMS/[facility]) 2010    Right breast   • Cancer (CMS/[facility]) 10/2017    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   • Charcot-Saloni-Tooth disease    • CPAP (continuous positive airway pressure) dependence    • GERD (gastroesophageal reflux disease)    • H/O Colon polyps    • H/O Uterine fibroid    • Heart murmur    • Hyperlipidemia    • Hypertension    • PONV (postoperative nausea and vomiting)      Past Surgical History:   Procedure Laterality Date   • BLADDER SURGERY      Bladder lift   • BREAST RECONSTRUCTION, BREAST TISSUE EXPANDER INSERTION Right 2010   • BREAST SURGERY Right 2010    Mastectomy   •  SECTION  ,    • CHOLECYSTECTOMY     • COLONOSCOPY     • HERNIA REPAIR  2015    [facility]   • HYSTERECTOMY      Partial   • KNEE SURGERY Right    • LEG SURGERY Left     Broken   • MASTECTOMY Right    • SD TREAT TIBIAL SHAFT FX, INTRAMED IMPLANT Left 2017    Procedure: TIBIA INTRAMEDULLARY NAIL/DON INSERTION;  Surgeon: Romero Shanks MD;  Location: [facility];  Service: Orthopedics   • THORACIC DECOMPRESSION POSTERIOR FUSION WITH INSTRUMENTATION N/A 2017    Procedure: T6 costotransversectomy and decompression with T3 to  T9 posterior spinal fusion with instrumentation [facility];  Surgeon: Quan Nayak MD;  Location: [facility];  Service:        ONCOLOGIC HISTORY:  (History from previous dates can be found in the separate document.)    Current Outpatient Medications on File Prior to Visit   Medication Sig Dispense Refill   • acetaminophen (TYLENOL) 500 MG tablet Take 500 mg by mouth Every 6 (Six) Hours As Needed for Mild Pain .     • calcium carbonate (OS-CARY) 600 MG tablet Take 600 mg by mouth 2 (Two) Times a Day With Meals.     • capecitabine (XELODA) 500  MG chemo tablet Take 3 tabs (1500 mg) by mouth twice a day for 7 days on then 7 days off. 84 tablet 3   • cholecalciferol (VITAMIN D3) 1000 units tablet Take 1,000 Units by mouth Daily.     • diazePAM (VALIUM) 10 MG tablet Take 1 tablet by mouth Every 12 (Twelve) Hours As Needed for Anxiety. 10 tablet 1   • diphenoxylate-atropine (LOMOTIL) 2.5-0.025 MG per tablet Take 1 tablet by mouth 4 (Four) Times a Day As Needed for Diarrhea. 60 tablet 0   • FLONASE ALLERGY RELIEF 50 MCG/ACT nasal spray 2 sprays into each nostril daily.  11   • gabapentin (Neurontin) 300 MG capsule Take 1 capsule by mouth 2 (Two) Times a Day. 180 capsule 1   • ketoconazole (NIZORAL) 2 % cream Apply  topically to the appropriate area as directed Daily. 15 g 0   • losartan (COZAAR) 50 MG tablet TAKE 1 TABLET BY MOUTH DAILY 90 tablet 2   • mesalamine (LIALDA) 1.2 g EC tablet TAKE 1 TABLET BY MOUTH TWICE DAILY 180 tablet 0   • metaxalone (SKELAXIN) 800 MG tablet Take 1 tablet by mouth 3 (Three) Times a Day As Needed for Muscle Spasms. 30 tablet 3   • mupirocin (BACTROBAN) 2 % ointment JUANITA TO SURGICAL SITE AND COVER WITH BAND AID D UNTIL HEALED     • omeprazole (PriLOSEC) 40 MG capsule TAKE 1 CAPSULE DAILY 90 capsule 2   • ondansetron ODT (ZOFRAN-ODT) 8 MG disintegrating tablet Take 1 tablet by mouth Every 8 (Eight) Hours As Needed for Nausea or Vomiting. 30 tablet 1   • phenazopyridine (PYRIDIUM) 200 MG tablet Take 200 mg by mouth 3 (Three) Times a Day As Needed for bladder spasms.     • prochlorperazine (COMPAZINE) 10 MG tablet Take 1 tablet by mouth Every 6 (Six) Hours As Needed for Nausea or Vomiting. 30 tablet 0   • sennosides-docusate sodium (SENOKOT-S) 8.6-50 MG tablet Take 2 tablets by mouth 2 (Two) Times a Day. 90 tablet 2   • sucralfate (CARAFATE) 1 g tablet Take 1 tablet by mouth 4 (Four) Times a Day. 30 tablet 2   • temazepam (RESTORIL) 15 MG capsule Take 1 capsule by mouth At Night As Needed for Sleep. 30 capsule 1   • traMADol (ULTRAM)  50 MG tablet Take 1 tablet by mouth Every 8 (Eight) Hours As Needed for Moderate Pain . 90 tablet 1   • triamcinolone (KENALOG) 0.1 % cream Apply  topically to the appropriate area as directed 2 (Two) Times a Day. 15 g 0   • trimethoprim (TRIMPEX) 100 MG tablet Take 100 mg by mouth Daily.     • XIIDRA 5 % ophthalmic solution INSTILL 1 DROP INTO EACH EYE BID.     • [DISCONTINUED] ELIQUIS 5 MG tablet tablet TAKE 1 TABLET BY MOUTH EVERY 12 HOURS 60 tablet 6     Current Facility-Administered Medications on File Prior to Visit   Medication Dose Route Frequency Provider Last Rate Last Dose   • [COMPLETED] denosumab (XGEVA) injection 120 mg  120 mg Subcutaneous Once Ubaldo Hancock Jr., MD   120 mg at 08/17/20 1331       ALLERGIES:     Allergies   Allergen Reactions   • Hydrocodone Nausea Only   • Morphine And Related Nausea And Vomiting     Social History     Socioeconomic History   • Marital status:      Spouse name: Murray   • Number of children: 3   • Years of education: College   • Highest education level: Not on file   Occupational History     Employer: [employer]     Employer: RETIRED   Tobacco Use   • Smoking status: Never Smoker   • Smokeless tobacco: Never Used   Substance and Sexual Activity   • Alcohol use: Yes     Comment: social   • Drug use: No   • Sexual activity: Defer     Family History   Problem Relation Age of Onset   • Heart disease Mother    • Hyperlipidemia Mother    • Hypertension Mother    • Diabetes Father    • Charcot-Saloni-Tooth disease Father    • Heart disease Father    • Hypertension Father    • Heart failure Father    • Diabetes Sister    • Heart disease Sister    • Hypertension Sister    • Heart disease Maternal Aunt    • Scoliosis Maternal Aunt    • Heart disease Maternal Uncle    • Diabetes Maternal Grandmother    • Heart disease Maternal Grandmother    • Hypertension Maternal Grandmother    • Uterine cancer Maternal Grandmother    • Heart disease Maternal Grandfather      Review of  Systems   Constitutional: Positive for appetite change, fatigue and unexpected weight change. Negative for activity change and fever.   HENT: Negative for congestion, mouth sores, nosebleeds, sore throat and voice change.    Eyes: Negative for discharge, itching and visual disturbance.   Respiratory: Negative for cough, shortness of breath and wheezing.    Cardiovascular: Negative for chest pain, palpitations and leg swelling.   Gastrointestinal: Negative for abdominal distention, abdominal pain, blood in stool, constipation, diarrhea, nausea and vomiting.   Endocrine: Negative for cold intolerance and heat intolerance.   Genitourinary: Negative for difficulty urinating, dysuria, flank pain, frequency, hematuria and urgency.   Musculoskeletal: Negative for arthralgias, back pain, joint swelling and myalgias.   Skin: Positive for rash. Negative for wound.   Neurological: Negative for dizziness, syncope, weakness, light-headedness, numbness and headaches.   Hematological: Negative for adenopathy. Does not bruise/bleed easily.   Psychiatric/Behavioral: Positive for sleep disturbance. Negative for confusion. The patient is not nervous/anxious.          Objective      Vitals:    08/17/20 1255   BP: 137/84   Pulse: 90   Resp: 16   Temp: 96.9 °F (36.1 °C)   SpO2: 95%      Current Status 8/17/2020   ECOG score 1   Pain 0/10    Physical Exam   Constitutional: She is oriented to person, place, and time. She appears well-developed and well-nourished.   HENT:   Mouth/Throat: Oropharynx is clear and moist.   Eyes: Conjunctivae are normal.   Neck: No thyromegaly present.   Cardiovascular: Normal rate and regular rhythm. Exam reveals no gallop and no friction rub.   No murmur heard.  Pulmonary/Chest: Breath sounds normal. No respiratory distress.   Abdominal: Soft. Bowel sounds are normal. She exhibits no distension. There is no tenderness.   Musculoskeletal: She exhibits no edema.   Lower extremity braces in place.  No  significant lower extremity edema   Lymphadenopathy:        Head (right side): No submandibular adenopathy present.     She has no cervical adenopathy.     She has no axillary adenopathy.        Right: No inguinal and no supraclavicular adenopathy present.        Left: No inguinal and no supraclavicular adenopathy present.   Neurological: She is alert and oriented to person, place, and time. She displays normal reflexes. No cranial nerve deficit. She exhibits normal muscle tone.   Bilateral lower extremity strength, 4+/5   Skin: Skin is warm and dry. Rash noted.   Mild erythema involving the palms of the hands.  Slight skin cracking in the hands.    Psychiatric: She has a normal mood and affect. Her behavior is normal.   Patient was examined today, unchanged from above.    RECENT LABS:  Hematology WBC   Date Value Ref Range Status   08/17/2020 4.22 3.40 - 10.80 10*3/mm3 Final   11/04/2019 4.09 3.40 - 10.80 10*3/mm3 Final     RBC   Date Value Ref Range Status   08/17/2020 3.65 (L) 3.77 - 5.28 10*6/mm3 Final   11/04/2019 4.26 3.77 - 5.28 10*6/mm3 Final     Hemoglobin   Date Value Ref Range Status   08/17/2020 12.6 12.0 - 15.9 g/dL Final     Hematocrit   Date Value Ref Range Status   08/17/2020 37.6 34.0 - 46.6 % Final     Platelets   Date Value Ref Range Status   08/17/2020 192 140 - 450 10*3/mm3 Final        Lab Results   Component Value Date    GLUCOSE 104 (H) 08/17/2020    BUN 19 08/17/2020    CREATININE 0.99 08/17/2020    EGFRIFNONA 57 (L) 08/17/2020    EGFRIFAFRI 66 11/04/2019    BCR 19.2 08/17/2020    K 5.1 08/17/2020    CO2 27.4 08/17/2020    CALCIUM 9.2 08/17/2020    PROTENTOTREF 6.3 11/04/2019    ALBUMIN 4.30 08/17/2020    LABIL2 2.3 11/04/2019    AST 36 (H) 08/17/2020    ALT 39 (H) 08/17/2020         Assessment/Plan      1. Previous Stage Ib ([id number]) right breast cancer:  · Diagnosed May 2010 with excisional biopsy for invasive ductal carcinoma, 1.3 cm, grade 2, ER 90%, NC 80%, HER-2/peter negative (1+  IHC).    · Subsequent right mastectomy in July 2010 with no residual breast malignancy, 1/5 sentinel lymph node with micrometastasis (0.25 mm).    · Treated in the [facility] system with adjuvant AC ×4 cycles in 2010 (no taxanes administered due to underlying Charcot-Saloni-Tooth with peripheral neuropathy).    · Adjuvant Femara (postmenopausal) initiated October 2010 with plan to treat ×10 years.    · Genetic testing reportedly negative.    · Developed osteopenia treated with Prolia beginning 2/27/13. Subsequently discontinued due to identification of metastatic disease.  2. Recurrent/metastatic disease identified 10/8/17:  · Disease involving thoracic spine with cord compression at T6, lumbosacral involvement, sternal and right sternoclavicular involvement.    · Femara discontinued in 10/2017.    · Radiation administered (in the [facility] system) to the thoracic spine beginning 10/19/17 treating T3-T9 to a dose of 24 gray in 6 fractions.  · Evaluation with MRI 12/8/17 showing persistent T6 cord compression with persistent neurologic compromise requiring surgical treatment 12/11/17 with T6 laminectomy/corpectomy and T3-T9 fusion.  Pathology with metastatic carcinoma of breast origin, ER negative, MN negative, HER-2/peter negative (1+ IHC).  · Additional staging evaluation 12/8/17 with no evidence of visceral metastatic disease, bone scan showing involvement of thoracic spine, sternum, left humerus, mid frontal bone.  No plane film correlate of left humerus lesion.  MRI lumbar spine with small intradural L3 metastasis.  CA 15-3 12/6/17- 17.  · Palliative radiation therapy to L3 dural metastasis and left humerus initiated 1/15/18 (10 fractions), completed 1/26/18.  · Hypercalcemia of malignancy with calcium in the 10-11 range.  · Initiation of monthly Xgeva 1/23/18.  · Baseline CT scan 1/30/18 with no evidence of visceral involvement.  Cluster of nodular opacities in the right lower lobe suspected to be infectious or related  to bronchiolitis. Bone scan 1/30/18 showed postsurgical change in the thoracic spine, stable uptake in the frontal bone, no new areas of disease.  · Initiation of palliative oral single agent Xeloda 2/7/18 2000 mg a.m., 1500 mg p.m. for 14/21 days.   · Following 3 cycles xeloda, bone scan 4/4/18 showed no change from the prior study.  CT scan 4/4/18 showed a small pericardial effusion of unclear significance as well as a subcutaneous nodule in the right lateral chest wall.  Subsequent evaluation with echocardiogram 4/17/18 showed no evidence of pericardial effusion.  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  We discussed the possibility of surgical excision of the right subcutaneous chest wall lesion for more definitive diagnosis.  Reviewed previous CT images dating back to 12/8/17 and the lesion was present even at that time measuring around 1.7 cm although not commented on in the radiology report.  As this appears to be an indolent low-grade process unrelated to her breast cancer, recommendee foregoing surgical excision at this time and monitoring this area on future scans.  The patient agreed.    · Following 6 cycles of Xeloda, CT 6/6/18  showed stable findings, no evidence of progressive disease.  There was a comment regarding subcutaneous abnormality in the anterior abdominal wall and this was related to Lovenox injection sites.  Bone scan 6/6/18 showed no interval change.   · CT scan and bone scan 8/13/18 following 9 cycles of Xeloda showed stable findings with no evidence of significant visceral metastases.  Her bone lesions appear stable on bone scan.  The spindle cell neoplasm in her right chest wall actually decreased in size from 2 cm down to 1.6 cm.    · The patient experienced some symptoms of diarrhea, anorexia, generalized weakness during cycle 9 Xeloda it  was unclear whether this was related to a viral gastroenteritis or toxicity from treatment.  Symptoms recurred during cycle 10 and treatment was cut short by 2 days.  Symptoms attributed to Xeloda.  With cycle 11, dose and schedule altered to 1500 mg twice daily for 7 days on followed by 7 days off .  · Most recent 3-month interval scans with bone scan 6/5/2020 showing no changes.  CT scan 6/5/2020 with no significant changes..    · The patient returns today continuing on treatment with Xeloda 1500 mg twice daily 7 days on followed by 7 days off.  She is due for monthly Xgeva today as well.  She has 1 day remaining on her current week of Xeloda.  She has had slight increase in hand-foot symptoms recently, moving boxes for her son who is moving out of their home.  She has not used Silke cream as frequently and I encouraged to increase this to 3-4 times per day at minimum.  She reports that her feet are in better shape than her hands.  She has had a slight decline in her appetite and weight is down 5 pounds.  Etiology of this is unclear.  I did suggest that we have her discuss things with oncology nutrition and I will place a referral.  We discussed strategies to increase caloric intake.  She will undergo CT scan and bone scan in 3 weeks and I will see her back in 4 weeks for follow-up.  She will receive Xgeva today and again when she returns in 1 month.  3. Right pulmonary embolism:  · Diagnosed on CT angiogram 10/21/17 involving small right lower lobe pulmonary artery.  Lower extremity Dopplers negative.  · Bilateral lower extremity Dopplers negative again 12/5/17.  · Received chronic Lovenox 1 mg/kg twice per day, transition to oral Eliquis in February 2019, continuing on Eliquis 5 mg twice daily.  4. Cancer related pain:  · Previously receiving Duragesic 50 µg patch every 72 hours along with Dilaudid 4 mg as needed for breakthrough pain  · The patient's pain improved over time and she was able to discontinue  both Duragesic and Dilaudid in the interval.  · Patient does take occasional Flexeril at bedtime due to back spasm/pain when she has been more active.  · The patient does have some occasional aggravation of her chronic back pain.    · No narcotic requirements recently.  5. Chemotherapy-induced diarrhea with subsequent C. difficile colitis in the setting of previous ulcerative colitis:  · Patient with reported history of ulcerative colitis, is not on active therapy.  · The patient developed initial diarrhea related to Xeloda at regular dosing.  · Symptoms improved on reduced dose Xeloda  · Flare of symptoms in October 2018 with apparent finding of C. difficile colitis by GI, treated with course of oral vancomycin with improvement in symptoms.  · Patient notes minimal intermittent diarrhea on Xeloda requiring occasional dosing of Imodium.  This is actually improved recently, reports only loose stool, no diarrhea.  6. Traumatic left tibia/fibular fracture:  · Status post ORIF 12/6/17  · Specimen was sent for pathologic review, negative for evidence of malignancy  7. Hypercalcemia:  · Suspect hypercalcemia of malignancy, calcium in  10-11 range previously.  · Calcium normalized following initiation of monthly Xgeva on 1/23/18.  8. Chemotherapy-induced mucositis:  · Patient had a minimal degree of mucositis with cycle 2.  The patient has magic mouthwash to use as needed.  No subsequent mucositis.  9. Recurrent UTI, bladder wall thickening on CT:  · Patient had an enterococcal UTI on 3/2/18 sensitive to nitrofurantoin and received treatment, unclear how long.  · Recurrent UTI 3/20/18 with urine culture growing Klebsiella, initially treated with nitrofurantoin, transitioned to Levaquin.  · CT 4/4/18 with diffuse bladder wall thickening with increased nodular thickening at the left base.  Referral to urogynecology Dr. May Johnson.  She was placed on a prophylactic dose of trimethoprim 100 mg daily, bladder wall thickening  felt to be related to recent recurrent urinary tract infections.  · Patient with urinary symptoms, treated with course of Macrobid at the end of December 2018, urine culture however was negative 12/31/18.  · Patient was found to have Klebsiella UTI 7/29/2019 which was successfully treated with Macrobid with complete resolution of symptoms.  10.   Mobility:  · The patient underwent an intensive course of rehabilitation at [facility].  She graduated from her outpatient course November 2018.  · Overall the patient has improved dramatically in terms of mobility, now walking around 1 mile per day without assistance.  11.  Depression:  · The patient weaned herself off of Cymbalta and reports that her symptoms remain stable.  12. 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  Symptoms did improve and she continued on the same dose.    · Symptoms slightly increased as noted above recently as she has been moving boxes for her son.  She has not been using emollient cream as frequently and I encouraged her to use this at least 3-4 times per day.  She continues to wear gloves at night.  13. Evidence of steatosis on scans with mild intermittent elevated liver function studies:  · Liver function studies increased 8/20/19 with ALT 98, AST 70, normal total bilirubin.  · Negative viral hepatitis A, B, and C panel 8/23/18  · Likely related to hepatic steatosis.   · Subsequent improvement in LFTs  · Today, ALT is stable at 39, AST stable at 36.  14. Chemotherapy induced leukopenia:  · WBC today 4.22 with normal differential  15. GERD:  · The patient continues on omeprazole 40 mg daily (2 x 20 mg) with improved symptoms.    16. Insomnia:  · Patient with prior paradoxical reaction to Benadryl  · Improved previously on temazepam 15 mg nightly as needed.   · Patient  noted subsequently that temazepam was having no effect.  She did increase gabapentin dosing to 600 mg nightly and this has helped.   17. Health maintenance:  · Patient notes that she has a history of colon polyps as well as ulcerative colitis and was due for a follow-up colonoscopy on 9/12/2019.  We did discuss there is no necessity to pursue colonoscopy in the setting of her metastatic breast cancer.    · The patient did undergo colonoscopy on 2/7/2020 with findings of muscular hypertrophy and diverticulosis.   18. Right shoulder pain:  · Patient was evaluated by Dr Forrester.  She has experienced difficulty over the past year and a half with her right shoulder although she has not complained of this in prior visits to our office.  She reports difficulty with abduction.    · The patient did undergo MRI of her right shoulder on 11/21/2019 at an outside facility showing multiple abnormalities including supraspinatus tendinosis, labral tear but no evidence of metastatic disease.  · Symptoms currently improved/resolved.  19. Ocular changes in part related to Xeloda:  · Patient experienced a mild degree of blurred vision as well as burning and pruritus.  · She was seen by her ophthalmologist and has been continuing on xiidra ophthalmic drops which have helped.  · Likely both issues are to some extent related to Xeloda.  · Symptoms currently stable to improved.    Plan:  1. Continue oral Xeloda 1500 mg twice a day 7 days on followed by 7 days off.  2. Monthly Xgeva today  3. Referral to oncology nutrition regarding reduced appetite and recent mild weight loss.  4. Continue Eliquis 5 mg twice daily.  5. Continue use of emollient cream on the hands and feet and continue to wear socks and gloves at night  6. Continue omeprazole 40 mg daily.    7. Continue xiidra ophthalmic drops prescribed by ophthalmology  8. In 3 weeks CT chest abdomen pelvis and bone scan  9. In 4 weeks MD visit with CBC, CMP, magnesium, phosphorus and  patient will be due for Xgeva.  We will review scan results.    Patient continuing on high risk medication requiring intensive monitoring.

## 2020-08-18 NOTE — PROGRESS NOTES
[facility] Lab Review- capecitabine      8/17/2020   WBC 3.40 - 10.80 10*3/mm3 4.22   Neutrophils Absolute 1.70 - 7.00 10*3/mm3 2.47   Hemoglobin 12.0 - 15.9 g/dL 12.6   Hematocrit 34.0 - 46.6 % 37.6   Platelets 140 - 450 10*3/mm3 192   Creatinine 0.57 - 1.00 mg/dL 0.99   eGFR Non African Am >60 mL/min/1.73 57 (A)   BUN 6 - 20 mg/dL 19   Sodium 136 - 145 mmol/L 143   Potassium 3.5 - 5.2 mmol/L 5.1   Glucose 65 - 99 mg/dL 104 (A)   Magnesium 1.6 - 2.6 mg/dL 2.1   Calcium 8.6 - 10.5 mg/dL 9.2   Albumin 3.50 - 5.20 g/dL 4.30   Total Protein 6.0 - 8.5 g/dL 6.3   AST (SGOT) 1 - 32 U/L 36 (A)   ALT (SGPT) 1 - 33 U/L 39 (A)   Alkaline Phosphatase 39 - 117 U/L 56   Total Bilirubin 0.0 - 1.2 mg/dL 0.7     MD dictation noted. Pharmacy will continue to follow.    Thanks,  Lubna Gupta, PharmD

## 2020-08-19 ENCOUNTER — SPECIALTY PHARMACY (OUTPATIENT)
Dept: PHARMACY | Facility: HOSPITAL | Age: 60
End: 2020-08-19

## 2020-08-21 ENCOUNTER — DOCUMENTATION (OUTPATIENT)
Dept: OTHER | Facility: HOSPITAL | Age: 60
End: 2020-08-21

## 2020-08-21 ENCOUNTER — TELEPHONE (OUTPATIENT)
Dept: ONCOLOGY | Facility: CLINIC | Age: 60
End: 2020-08-21

## 2020-08-21 NOTE — PROGRESS NOTES
Outpatient Oncology Nutrition      Patient Name:  Martha Davey  YOB: 1960  MRN: [id number]      Assessment Date:  8/21/2020    Comments:  Referral for decreased po intake and slight weight loss. Chart and history reviewed. Currently being treated with xeloda on 7 days, 7 days off, monthly xgeva.  Mets breast cancer.  Weight 193 lb, weight has ranged 193-199 for the last few months.    Spoke to patient on the phone today. She is feeling better and believes that it was an over the counter vitamin for eyes that was causing her GI upset. She has stopped taking it. She has also been drinking protein shakes that her  brought home. I suggested she check the ingredients on them because sometimes chicory root or inulin in high protein shakes can cause bloating and gas.   Encouraged her to call with any questions or concerns if needed.            Electronically signed by:  Margret Spicer RD, VITO  08/21/20 14:30

## 2020-09-04 ENCOUNTER — TELEPHONE (OUTPATIENT)
Dept: ONCOLOGY | Facility: CLINIC | Age: 60
End: 2020-09-04

## 2020-09-04 RX ORDER — TEMAZEPAM 15 MG/1
15 CAPSULE ORAL NIGHTLY PRN
Qty: 30 CAPSULE | Refills: 1 | Status: CANCELLED | OUTPATIENT
Start: 2020-09-04

## 2020-09-04 RX ORDER — TEMAZEPAM 15 MG/1
15 CAPSULE ORAL NIGHTLY PRN
Qty: 30 CAPSULE | Refills: 1 | Status: SHIPPED | OUTPATIENT
Start: 2020-09-04 | End: 2020-10-12 | Stop reason: SDUPTHER

## 2020-09-04 NOTE — TELEPHONE ENCOUNTER
Per pt [facility] is needing approval from Dr. Hancock before refilling medication temazepam (RESTORIL) 15 MG capsule. She has 10 days worth of meds left but says it may take about a week for the refill to be delivered.    Pharmacy: [facility] - [address] [phone]  - [phone]     Pt #: [phone]

## 2020-09-04 NOTE — TELEPHONE ENCOUNTER
Restoril refill sent to Dr. Hancock for approval. VM left for pt informing her and to call with any questions or concerns.

## 2020-09-08 NOTE — PROGRESS NOTES
Subjective .     REASONS FOR FOLLOWUP:    1. Stage Ib ([id number]) right breast cancer: Diagnosed May 2010 with excisional biopsy for invasive ductal carcinoma, 1.3 cm, grade 2, ER 90%, RI 80%, HER-2/peter negative (1+ IHC).  Subsequent right mastectomy in July 2010 with no residual breast malignancy, 1/5 sentinel lymph node with micrometastasis (0.25 mm).  Treated in the [facility] with adjuvant AC ×4 cycles in 2010 (no taxanes administered due to underlying Charcot-Saloni-Tooth with peripheral neuropathy).  Adjuvant Femara (postmenopausal) initiated October 2010 with plan to treat ×10 years.  Genetic testing reportedly negative.  Developed osteopenia treated with Prolia beginning 2/27/13.  2. Recurrent/metastatic disease identified 10/8/17 involving thoracic spine with cord compression at T6, lumbosacral involvement, sternal and right sternoclavicular involvement.  Femara discontinued.  Radiation administered (in the [facility] system) to the thoracic spine beginning 10/19/17 treating T3-T9 to a dose of 24 gray in 6 fractions.  3. Evaluation with MRI 12/8/17 showing persistent T6 cord compression with persistent neurologic compromise requiring surgical treatment 12/11/17 with T6 laminectomy/corpectomy and T3-T9 fusion.  Pathology with metastatic carcinoma of breast origin, ER negative, RI negative, HER-2/peter negative (1+ IHC).  4. Right pulmonary embolism 10/21/17 treated with Lovenox 1 mg/kg (100 mg) every 12 hours.  No evidence of DVT.  Lovenox held due to right gluteus hematoma 3/23/18 through 4/6/18.  Transitioned to oral Eliquis 5mg bid 1/28/19 (as of 2/25/19, patient still using previous supply of Lovenox).  5. Cancer related pain previously on Duragesic 50 µg patch every 72 hours and Dilaudid 4 mg as needed for breakthrough pain.  Narcotics subsequently discontinued with improvement in pain in January 2018.  6. Radiation therapy to L3 dural metastasis and left humerus initiated 1/15/18 (10 fractions),  completed 1/26/18  7. Monthly Xgeva initiated 1/23/18  8. Mild hypercalcemia of malignancy  9. Initiation of palliative oral single agent Xeloda 2/7/18 (2000 mg a.m., 1500 mg p.m. for 14/21 days).  10. 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  In reviewing prior CT scans, has been present for some time.  Monitor for now, if it enlarges consider surgical excision.  11. Cumulative side effects from Xeloda with fatigue, anorexia, diarrhea, increased tearing.  Alteration in dose/schedule with cycle 11 to 1500 mg twice a day for 7 days on followed by 7 days off.    HISTORY OF PRESENT ILLNESS:  The patient is a 59 y.o. year old female who is here for follow-up with the above-mentioned history.  The patient continues on Xeloda    History of Present Illness the patient returns today in follow-up continuing on oral [id number] mg twice daily 7 days on followed by 7 days off and also continuing on monthly Xgeva that is due today with laboratory studies as well as CT scans and bone scan to review.  The patient has been tolerating Xeloda reasonably well.  She does note ongoing hand-foot symptoms, does use them only occurring frequently.  There is minimal skin peeling.  She notes that her appetite is somewhat diminished and she has lost a few pounds over the past month.  She remains fairly active, ambulates with the use of a cane.  She reports occasional diarrhea requires Imodium 2-3 times per month.  She does note a recent increase in abdominal gas and bloating for which she uses over-the-counter Gas-X with relief.  She does note fairly significant reflux symptoms.  This does keep her up at night often and does induce a cough at night when she lies down.  She does take omeprazole 40 mg in the morning.  We did previously prescribed Carafate however she did  not take the medication.  She denies any new musculoskeletal pain.    Past Medical History:   Diagnosis Date   • Acute pulmonary embolism, cancer-related 10/2017   • Allergic rhinitis    • Arthritis     Right knee; left shoulder   • Cancer (CMS/Formerly [facility]) 2010    Right breast   • Cancer (CMS/Formerly [facility]) 10/2017    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   • Charcot-Saolni-Tooth disease    • CPAP (continuous positive airway pressure) dependence    • GERD (gastroesophageal reflux disease)    • H/O Colon polyps    • H/O Uterine fibroid    • Heart murmur    • Hyperlipidemia    • Hypertension    • PONV (postoperative nausea and vomiting)      Past Surgical History:   Procedure Laterality Date   • BLADDER SURGERY      Bladder lift   • BREAST RECONSTRUCTION, BREAST TISSUE EXPANDER INSERTION Right    • BREAST SURGERY Right 2010    Mastectomy   •  SECTION  ,    • CHOLECYSTECTOMY     • COLONOSCOPY     • HERNIA REPAIR  2015    [facility]   • HYSTERECTOMY      Partial   • KNEE SURGERY Right    • LEG SURGERY Left     Broken   • MASTECTOMY Right    • CO TREAT TIBIAL SHAFT FX, INTRAMED IMPLANT Left 2017    Procedure: TIBIA INTRAMEDULLARY NAIL/DON INSERTION;  Surgeon: Romero Shanks MD;  Location: [facility];  Service: Orthopedics   • THORACIC DECOMPRESSION POSTERIOR FUSION WITH INSTRUMENTATION N/A 2017    Procedure: T6 costotransversectomy and decompression with T3 to  T9 posterior spinal [facility];  Surgeon: Quan Nayak MD;  Location: [facility];  Service:        ONCOLOGIC HISTORY:  (History from previous dates can be found in the separate document.)    Current Outpatient Medications on File Prior to Visit   Medication Sig Dispense Refill   • acetaminophen (TYLENOL) 500 MG tablet Take 500 mg by mouth Every 6 (Six) Hours As Needed for Mild Pain .     • calcium carbonate (OS-CARY) 600 MG tablet Take 600 mg by mouth 2 (Two) Times a  Day With Meals.     • capecitabine (XELODA) 500 MG chemo tablet Take 3 tabs (1500 mg) by mouth twice a day for 7 days on then 7 days off. 84 tablet 3   • cholecalciferol (VITAMIN D3) 1000 units tablet Take 1,000 Units by mouth Daily.     • diazePAM (VALIUM) 10 MG tablet Take 1 tablet by mouth Every 12 (Twelve) Hours As Needed for Anxiety. 10 tablet 1   • diphenoxylate-atropine (LOMOTIL) 2.5-0.025 MG per tablet Take 1 tablet by mouth 4 (Four) Times a Day As Needed for Diarrhea. 60 tablet 0   • ELIQUIS 5 MG tablet tablet TAKE 1 TABLET BY MOUTH EVERY 12 HOURS 60 tablet 6   • FLONASE ALLERGY RELIEF 50 MCG/ACT nasal spray 2 sprays into each nostril daily.  11   • gabapentin (Neurontin) 300 MG capsule Take 1 capsule by mouth 2 (Two) Times a Day. 180 capsule 1   • losartan (COZAAR) 50 MG tablet TAKE 1 TABLET BY MOUTH DAILY 90 tablet 2   • mesalamine (LIALDA) 1.2 g EC tablet TAKE 1 TABLET BY MOUTH TWICE DAILY 180 tablet 0   • metaxalone (SKELAXIN) 800 MG tablet Take 1 tablet by mouth 3 (Three) Times a Day As Needed for Muscle Spasms. 30 tablet 3   • mupirocin (BACTROBAN) 2 % ointment JUANITA TO SURGICAL SITE AND COVER WITH BAND AID D UNTIL HEALED     • omeprazole (PriLOSEC) 40 MG capsule TAKE 1 CAPSULE DAILY 90 capsule 2   • ondansetron ODT (ZOFRAN-ODT) 8 MG disintegrating tablet Take 1 tablet by mouth Every 8 (Eight) Hours As Needed for Nausea or Vomiting. 30 tablet 1   • prochlorperazine (COMPAZINE) 10 MG tablet Take 1 tablet by mouth Every 6 (Six) Hours As Needed for Nausea or Vomiting. 30 tablet 0   • sennosides-docusate sodium (SENOKOT-S) 8.6-50 MG tablet Take 2 tablets by mouth 2 (Two) Times a Day. 90 tablet 2   • temazepam (RESTORIL) 15 MG capsule Take 1 capsule by mouth At Night As Needed for Sleep. 30 capsule 1   • traMADol (ULTRAM) 50 MG tablet Take 1 tablet by mouth Every 8 (Eight) Hours As Needed for Moderate Pain . 90 tablet 1   • triamcinolone (KENALOG) 0.1 % cream Apply  topically to the appropriate area as  directed 2 (Two) Times a Day. 15 g 0   • trimethoprim (TRIMPEX) 100 MG tablet Take 100 mg by mouth Daily.     • XIIDRA 5 % ophthalmic solution INSTILL 1 DROP INTO EACH EYE BID.     • ketoconazole (NIZORAL) 2 % cream Apply  topically to the appropriate area as directed Daily. 15 g 0   • phenazopyridine (PYRIDIUM) 200 MG tablet Take 200 mg by mouth 3 (Three) Times a Day As Needed for bladder spasms.     • [DISCONTINUED] sucralfate (CARAFATE) 1 g tablet Take 1 tablet by mouth 4 (Four) Times a Day. 30 tablet 2     Current Facility-Administered Medications on File Prior to Visit   Medication Dose Route Frequency Provider Last Rate Last Dose   • [COMPLETED] denosumab (XGEVA) injection 120 mg  120 mg Subcutaneous Once Ubaldo Hancock Jr., MD   120 mg at 09/14/20 1288       ALLERGIES:     Allergies   Allergen Reactions   • Hydrocodone Nausea Only   • Morphine And Related Nausea And Vomiting     Social History     Socioeconomic History   • Marital status:      Spouse name: Murray   • Number of children: 3   • Years of education: College   • Highest education level: Not on file   Occupational History     Employer: [employer]     Employer: RETIRED   Tobacco Use   • Smoking status: Never Smoker   • Smokeless tobacco: Never Used   Substance and Sexual Activity   • Alcohol use: Yes     Comment: social   • Drug use: No   • Sexual activity: Defer     Family History   Problem Relation Age of Onset   • Heart disease Mother    • Hyperlipidemia Mother    • Hypertension Mother    • Diabetes Father    • Charcot-Saloni-Tooth disease Father    • Heart disease Father    • Hypertension Father    • Heart failure Father    • Diabetes Sister    • Heart disease Sister    • Hypertension Sister    • Heart disease Maternal Aunt    • Scoliosis Maternal Aunt    • Heart disease Maternal Uncle    • Diabetes Maternal Grandmother    • Heart disease Maternal Grandmother    • Hypertension Maternal Grandmother    • Uterine cancer Maternal Grandmother     • Heart disease Maternal Grandfather      Review of Systems   Constitutional: Positive for fatigue and unexpected weight change. Negative for activity change and fever.   HENT: Negative for congestion, mouth sores, nosebleeds, sore throat and voice change.    Eyes: Negative for discharge, itching and visual disturbance.   Respiratory: Negative for cough, shortness of breath and wheezing.    Cardiovascular: Negative for chest pain, palpitations and leg swelling.   Gastrointestinal: Negative for abdominal distention, abdominal pain, blood in stool, constipation, diarrhea, nausea and vomiting.   Endocrine: Negative for cold intolerance and heat intolerance.   Genitourinary: Negative for difficulty urinating, dysuria, flank pain, frequency, hematuria and urgency.   Musculoskeletal: Negative for arthralgias, back pain, joint swelling and myalgias.   Skin: Positive for rash. Negative for wound.   Neurological: Negative for dizziness, syncope, weakness, light-headedness, numbness and headaches.   Hematological: Negative for adenopathy. Does not bruise/bleed easily.   Psychiatric/Behavioral: Positive for sleep disturbance. Negative for confusion. The patient is not nervous/anxious.          Objective      Vitals:    09/14/20 1257   BP: 130/82   Pulse: 89   Temp: 96.9 °F (36.1 °C)   SpO2: 96%      Current Status 9/14/2020   ECOG score 1   Pain 0/10    Physical Exam   Constitutional: She is oriented to person, place, and time. She appears well-developed and well-nourished.   HENT:   Mouth/Throat: Oropharynx is clear and moist.   Eyes: Conjunctivae are normal.   Neck: No thyromegaly present.   Cardiovascular: Normal rate and regular rhythm. Exam reveals no gallop and no friction rub.   No murmur heard.  Pulmonary/Chest: Breath sounds normal. No respiratory distress.   Abdominal: Soft. Bowel sounds are normal. She exhibits no distension. There is no tenderness.   Musculoskeletal: She exhibits no edema.   Lower extremity  braces in place.  No significant lower extremity edema   Lymphadenopathy:        Head (right side): No submandibular adenopathy present.     She has no cervical adenopathy.     She has no axillary adenopathy.        Right: No inguinal and no supraclavicular adenopathy present.        Left: No inguinal and no supraclavicular adenopathy present.   Neurological: She is alert and oriented to person, place, and time. She displays normal reflexes. No cranial nerve deficit. She exhibits normal muscle tone.   Bilateral lower extremity strength, 4+/5   Skin: Skin is warm and dry. Rash noted.   Mild erythema involving the palms of the hands.  Slight skin cracking in the hands.    Psychiatric: She has a normal mood and affect. Her behavior is normal.   Patient was examined today, unchanged from above.    RECENT LABS:  Hematology WBC   Date Value Ref Range Status   09/14/2020 3.28 (L) 3.40 - 10.80 10*3/mm3 Final   11/04/2019 4.09 3.40 - 10.80 10*3/mm3 Final     RBC   Date Value Ref Range Status   09/14/2020 3.82 3.77 - 5.28 10*6/mm3 Final   11/04/2019 4.26 3.77 - 5.28 10*6/mm3 Final     Hemoglobin   Date Value Ref Range Status   09/14/2020 12.7 12.0 - 15.9 g/dL Final     Hematocrit   Date Value Ref Range Status   09/14/2020 38.5 34.0 - 46.6 % Final     Platelets   Date Value Ref Range Status   09/14/2020 207 140 - 450 10*3/mm3 Final        Lab Results   Component Value Date    GLUCOSE 96 09/14/2020    BUN 20 09/14/2020    CREATININE 0.87 09/14/2020    EGFRIFNONA 67 09/14/2020    EGFRIFAFRI 66 11/04/2019    BCR 23.0 09/14/2020    K 4.7 09/14/2020    CO2 28.8 09/14/2020    CALCIUM 9.1 09/14/2020    PROTENTOTREF 6.3 11/04/2019    ALBUMIN 4.10 09/14/2020    LABIL2 2.3 11/04/2019    AST 30 09/14/2020    ALT 29 09/14/2020     Reviewed CT chest abdomen pelvis and bone scan as outlined above and below.  I did personally review CT images.  I discussed the bone scan findings on the telephone with Dr. Ortega in  radiology.    Assessment/Plan      1. Previous Stage Ib ([id number]) right breast cancer:  · Diagnosed May 2010 with excisional biopsy for invasive ductal carcinoma, 1.3 cm, grade 2, ER 90%, CO 80%, HER-2/peter negative (1+ IHC).    · Subsequent right mastectomy in July 2010 with no residual breast malignancy, 1/5 sentinel lymph node with micrometastasis (0.25 mm).    · Treated in the [facility] system with adjuvant AC ×4 cycles in 2010 (no taxanes administered due to underlying Charcot-Saloni-Tooth with peripheral neuropathy).    · Adjuvant Femara (postmenopausal) initiated October 2010 with plan to treat ×10 years.    · Genetic testing reportedly negative.    · Developed osteopenia treated with Prolia beginning 2/27/13. Subsequently discontinued due to identification of metastatic disease.  2. Recurrent/metastatic disease identified 10/8/17:  · Disease involving thoracic spine with cord compression at T6, lumbosacral involvement, sternal and right sternoclavicular involvement.    · Femara discontinued in 10/2017.    · Radiation administered (in the [facility] system) to the thoracic spine beginning 10/19/17 treating T3-T9 to a dose of 24 gray in 6 fractions.  · Evaluation with MRI 12/8/17 showing persistent T6 cord compression with persistent neurologic compromise requiring surgical treatment 12/11/17 with T6 laminectomy/corpectomy and T3-T9 fusion.  Pathology with metastatic carcinoma of breast origin, ER negative, CO negative, HER-2/peter negative (1+ IHC).  · Additional staging evaluation 12/8/17 with no evidence of visceral metastatic disease, bone scan showing involvement of thoracic spine, sternum, left humerus, mid frontal bone.  No plane film correlate of left humerus lesion.  MRI lumbar spine with small intradural L3 metastasis.  CA 15-3 12/6/17- 17.  · Palliative radiation therapy to L3 dural metastasis and left humerus initiated 1/15/18 (10 fractions), completed 1/26/18.  · Hypercalcemia of malignancy with calcium  in the 10-11 range.  · Initiation of monthly Xgeva 1/23/18.  · Baseline CT scan 1/30/18 with no evidence of visceral involvement.  Cluster of nodular opacities in the right lower lobe suspected to be infectious or related to bronchiolitis. Bone scan 1/30/18 showed postsurgical change in the thoracic spine, stable uptake in the frontal bone, no new areas of disease.  · Initiation of palliative oral single agent Xeloda 2/7/18 2000 mg a.m., 1500 mg p.m. for 14/21 days.   · Following 3 cycles xeloda, bone scan 4/4/18 showed no change from the prior study.  CT scan 4/4/18 showed a small pericardial effusion of unclear significance as well as a subcutaneous nodule in the right lateral chest wall.  Subsequent evaluation with echocardiogram 4/17/18 showed no evidence of pericardial effusion.  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  We discussed the possibility of surgical excision of the right subcutaneous chest wall lesion for more definitive diagnosis.  Reviewed previous CT images dating back to 12/8/17 and the lesion was present even at that time measuring around 1.7 cm although not commented on in the radiology report.  As this appears to be an indolent low-grade process unrelated to her breast cancer, recommendee foregoing surgical excision at this time and monitoring this area on future scans.  The patient agreed.    · Following 6 cycles of Xeloda, CT 6/6/18  showed stable findings, no evidence of progressive disease.  There was a comment regarding subcutaneous abnormality in the anterior abdominal wall and this was related to Lovenox injection sites.  Bone scan 6/6/18 showed no interval change.   · CT scan and bone scan 8/13/18 following 9 cycles of Xeloda showed stable findings with no evidence of significant visceral metastases.  Her bone lesions appear stable on bone  scan.  The spindle cell neoplasm in her right chest wall actually decreased in size from 2 cm down to 1.6 cm.    · The patient experienced some symptoms of diarrhea, anorexia, generalized weakness during cycle 9 Xeloda it was unclear whether this was related to a viral gastroenteritis or toxicity from treatment.  Symptoms recurred during cycle 10 and treatment was cut short by 2 days.  Symptoms attributed to Xeloda.  With cycle 11, dose and schedule altered to 1500 mg twice daily for 7 days on followed by 7 days off .  · Most recent 3-month interval scans with CT scan 9/9/2020 with no significant changes.  Bone scan 9/9/2020 read as unchanged from prior studies however did note an area of slight activity in the medial left femur.  Contacted radiology and although this was not noted on prior reports appears to have been present.  Unclear etiology, recommended MRI for further evaluation.  · The patient returns today continuing on treatment with Xeloda 1500 mg twice daily 7 days on followed by 7 days off.  She is due for monthly Xgeva today as well.  The patient appears to be tolerating treatment relatively well.  Her hand-foot symptoms are stable.  She has some minimal diarrhea.  She has some mild fatigue.  We reviewed her scans.  CT scans chest abdomen pelvis 9/9/2020 showed a small cluster of nodules in the right upper lobe that were new but this is likely inflammatory in nature.  There were no findings to suggest progressive malignancy.  The bone scan was read as unchanged as well however in reviewing the report, there was notation of activity in the left proximal femur which I did not recall seeing previously.  There is no mention of this on prior reports.  I did contact Dr. Ortega in radiology.  He confirmed that the area is subtle and was present on prior scans and is unchanged.  He discussed that the area may not necessarily be related to malignancy and recommended MRI for further evaluation.  We will obtain  MRI therefore the left femur in the next week and the patient will call for report.  If there was a metastatic focus identified we would need to consider prophylactic radiation given its location.  She is asymptomatic.  We will continue on with Xeloda as planned.  The patient will receive Xgeva as planned as well.  We will make arrangements for her to return in 4 weeks for nurse practitioner visit with CBC, CMP, magnesium, phosphorus and Xgeva.  I will see her in 8 weeks with CBC, CMP, magnesium, phosphorus and Xgeva.  In 11 weeks she will undergo repeat CT scans and bone scan.  I will see her again in 12 weeks for follow-up with same labs and Xgeva.  3. Right pulmonary embolism:  · Diagnosed on CT angiogram 10/21/17 involving small right lower lobe pulmonary artery.  Lower extremity Dopplers negative.  · Bilateral lower extremity Dopplers negative again 12/5/17.  · Received chronic Lovenox 1 mg/kg twice per day, transition to oral Eliquis in February 2019, continuing on Eliquis 5 mg twice daily.  4. Cancer related pain:  · Previously receiving Duragesic 50 µg patch every 72 hours along with Dilaudid 4 mg as needed for breakthrough pain  · The patient's pain improved over time and she was able to discontinue both Duragesic and Dilaudid in the interval.  · Patient does take occasional Flexeril at bedtime due to back spasm/pain when she has been more active.  · The patient does have some occasional aggravation of her chronic back pain.    · No narcotic requirements recently.  5. Chemotherapy-induced diarrhea with subsequent C. difficile colitis in the setting of previous ulcerative colitis:  · Patient with reported history of ulcerative colitis, is not on active therapy.  · The patient developed initial diarrhea related to Xeloda at regular dosing.  · Symptoms improved on reduced dose Xeloda  · Flare of symptoms in October 2018 with apparent finding of C. difficile colitis by GI, treated with course of oral vancomycin  with improvement in symptoms.  · Patient notes minimal intermittent diarrhea on Xeloda requiring occasional dosing of Imodium.    6. Traumatic left tibia/fibular fracture:  · Status post ORIF 12/6/17  · Specimen was sent for pathologic review, negative for evidence of malignancy  7. Hypercalcemia:  · Suspect hypercalcemia of malignancy, calcium in  10-11 range previously.  · Calcium normalized following initiation of monthly Xgeva on 1/23/18.  8. Chemotherapy-induced mucositis:  · Patient had a minimal degree of mucositis with cycle 2.  The patient has magic mouthwash to use as needed.  No subsequent mucositis.  9. Recurrent UTI, bladder wall thickening on CT:  · Patient had an enterococcal UTI on 3/2/18 sensitive to nitrofurantoin and received treatment, unclear how long.  · Recurrent UTI 3/20/18 with urine culture growing Klebsiella, initially treated with nitrofurantoin, transitioned to Levaquin.  · CT 4/4/18 with diffuse bladder wall thickening with increased nodular thickening at the left base.  Referral to urogynecology Dr. May Johnson.  She was placed on a prophylactic dose of trimethoprim 100 mg daily, bladder wall thickening felt to be related to recent recurrent urinary tract infections.  · Patient with urinary symptoms, treated with course of Macrobid at the end of December 2018, urine culture however was negative 12/31/18.  · Patient was found to have Klebsiella UTI 7/29/2019 which was successfully treated with Macrobid with complete resolution of symptoms.  10.   Mobility:  · The patient underwent an intensive course of [facility] at [facility].  She graduated from her outpatient course November 2018.  · Overall the patient has improved dramatically in terms of mobility, now walking around 1 mile per day without assistance.  11.  Depression:  · The patient weaned herself off of Cymbalta and reports that her symptoms remain stable.  12. Hand-foot syndrome secondary to Xeloda:  · Patient continues with  frequent application of emollient cream to the hands and feet  · Symptoms increased significantly requiring a 1 week delay in cycle 18 Xeloda as noted above.  Symptoms did improve and she continued on the same dose.    · Symptoms are currently stable.  13. Evidence of steatosis on scans with mild intermittent elevated liver function studies:  · Liver function studies increased 8/20/19 with ALT 98, AST 70, normal total bilirubin.  · Negative viral hepatitis A, B, and C panel 8/23/18  · Likely related to hepatic steatosis.   · Subsequent improvement in LFTs  · Today, LFTs normal  14. Chemotherapy induced leukopenia:  · WBC today 3.28 with normal differential  15. GERD:  · The patient continues on omeprazole 40 mg daily (2 x 20 mg).  · Today the patient reports that her symptoms have been significant recently.  She notes that the symptoms are worse when lying down and do cause a cough.  We previously prescribed Carafate however she never obtained the medication.  I did recommend this again and sent in a new prescription for Carafate 1 g 4 times daily.  16. Insomnia:  · Patient with prior paradoxical reaction to Benadryl  · Improved previously on temazepam 15 mg nightly as needed.   · Patient noted subsequently that temazepam was having no effect.  She did increase gabapentin dosing to 600 mg nightly and this has helped.   17. Health maintenance:  · Patient notes that she has a history of colon polyps as well as ulcerative colitis and was due for a follow-up colonoscopy on 9/12/2019.  We did discuss there is no necessity to pursue colonoscopy in the setting of her metastatic breast cancer.    · The patient did undergo colonoscopy on 2/7/2020 with findings of muscular hypertrophy and diverticulosis.   18. Right shoulder pain:  · Patient was evaluated by Dr Forrester.  She has experienced difficulty over the past year and a half with her right shoulder although she has not complained of this in prior visits to our office.   She reports difficulty with abduction.    · The patient did undergo MRI of her right shoulder on 11/21/2019 at an outside facility showing multiple abnormalities including supraspinatus tendinosis, labral tear but no evidence of metastatic disease.  · Symptoms currently improved/resolved.  19. Ocular changes in part related to Xeloda:  · Patient experienced a mild degree of blurred vision as well as burning and pruritus.  · She was seen by her ophthalmologist and has been continuing on xiidra ophthalmic drops which have helped.  · Likely both issues are to some extent related to Xeloda.  · Symptoms currently stable to improved.    Plan:  1. Continue oral Xeloda 1500 mg twice a day 7 days on followed by 7 days off.  2. Monthly Xgeva today  3. Continue Eliquis 5 mg twice daily.  4. Continue use of emollient cream on the hands and feet and continue to wear socks and gloves at night  5. Continue omeprazole 40 mg daily.    6. Continue xiidra ophthalmic drops prescribed by ophthalmology  7. New prescription sent for Carafate 1 g 4 times daily  8. MRI of the left femur in the next week or so.  Patient will call for the report  9. In 4 weeks nurse practitioner visit with CBC, CMP, magnesium, phosphorus and Xgeva  10. In 8 weeks MD visit with CBC, CMP, magnesium, phosphorus and Xgeva  11. In 11 weeks CT chest abdomen pelvis and bone scan  12. In 12 weeks MD visit with CBC, CMP, magnesium, phosphorus and Xgeva.  We will review scan results.    Patient continuing on high risk medication requiring intensive monitoring.

## 2020-09-09 ENCOUNTER — HOSPITAL ENCOUNTER (OUTPATIENT)
Dept: NUCLEAR MEDICINE | Facility: HOSPITAL | Age: 60
Discharge: HOME | End: 2020-09-09

## 2020-09-09 ENCOUNTER — HOSPITAL ENCOUNTER (OUTPATIENT)
Dept: CT IMAGING | Facility: HOSPITAL | Age: 60
Discharge: HOME | End: 2020-09-09
Admitting: INTERNAL MEDICINE

## 2020-09-09 DIAGNOSIS — Z17.1 MALIGNANT NEOPLASM OF OVERLAPPING SITES OF RIGHT BREAST IN FEMALE, ESTROGEN RECEPTOR NEGATIVE (HCC): ICD-10-CM

## 2020-09-09 DIAGNOSIS — C50.811 MALIGNANT NEOPLASM OF OVERLAPPING SITES OF RIGHT BREAST IN FEMALE, ESTROGEN RECEPTOR NEGATIVE (HCC): ICD-10-CM

## 2020-09-09 LAB — CREAT BLDA-MCNC: 0.9 MG/DL (ref 0.6–1.3)

## 2020-09-09 PROCEDURE — 74177 CT ABD & PELVIS W/CONTRAST: CPT

## 2020-09-09 PROCEDURE — 78306 BONE IMAGING WHOLE BODY: CPT

## 2020-09-09 PROCEDURE — 0 DIATRIZOATE MEGLUMINE & SODIUM PER 1 ML: Performed by: INTERNAL MEDICINE

## 2020-09-09 PROCEDURE — 71260 CT THORAX DX C+: CPT

## 2020-09-09 PROCEDURE — 82565 ASSAY OF CREATININE: CPT

## 2020-09-09 PROCEDURE — 0 TECHNETIUM MEDRONATE KIT: Performed by: INTERNAL MEDICINE

## 2020-09-09 PROCEDURE — 25010000002 IOPAMIDOL 61 % SOLUTION: Performed by: INTERNAL MEDICINE

## 2020-09-09 PROCEDURE — A9503 TC99M MEDRONATE: HCPCS | Performed by: INTERNAL MEDICINE

## 2020-09-09 RX ORDER — TC 99M MEDRONATE 20 MG/10ML
20.6 INJECTION, POWDER, LYOPHILIZED, FOR SOLUTION INTRAVENOUS
Status: COMPLETED | OUTPATIENT
Start: 2020-09-09 | End: 2020-09-09

## 2020-09-09 RX ADMIN — DIATRIZOATE MEGLUMINE AND DIATRIZOATE SODIUM 30 ML: 600; 100 SOLUTION ORAL; RECTAL at 12:59

## 2020-09-09 RX ADMIN — IOPAMIDOL 85 ML: 612 INJECTION, SOLUTION INTRAVENOUS at 13:00

## 2020-09-09 RX ADMIN — Medication 20.6 MILLICURIE: at 11:45

## 2020-09-14 ENCOUNTER — OFFICE VISIT (OUTPATIENT)
Dept: ONCOLOGY | Facility: CLINIC | Age: 60
End: 2020-09-14

## 2020-09-14 ENCOUNTER — LAB (OUTPATIENT)
Dept: OTHER | Facility: HOSPITAL | Age: 60
End: 2020-09-14

## 2020-09-14 ENCOUNTER — INFUSION (OUTPATIENT)
Dept: ONCOLOGY | Facility: HOSPITAL | Age: 60
End: 2020-09-14

## 2020-09-14 ENCOUNTER — TELEPHONE (OUTPATIENT)
Dept: ONCOLOGY | Facility: HOSPITAL | Age: 60
End: 2020-09-14

## 2020-09-14 VITALS
SYSTOLIC BLOOD PRESSURE: 130 MMHG | WEIGHT: 191.5 LBS | TEMPERATURE: 96.9 F | OXYGEN SATURATION: 96 % | BODY MASS INDEX: 36.2 KG/M2 | DIASTOLIC BLOOD PRESSURE: 82 MMHG | HEART RATE: 89 BPM

## 2020-09-14 DIAGNOSIS — Z17.1 MALIGNANT NEOPLASM OF OVERLAPPING SITES OF RIGHT BREAST IN FEMALE, ESTROGEN RECEPTOR NEGATIVE (HCC): Primary | ICD-10-CM

## 2020-09-14 DIAGNOSIS — C50.811 MALIGNANT NEOPLASM OF OVERLAPPING SITES OF RIGHT BREAST IN FEMALE, ESTROGEN RECEPTOR NEGATIVE (HCC): Primary | ICD-10-CM

## 2020-09-14 DIAGNOSIS — Z17.1 MALIGNANT NEOPLASM OF OVERLAPPING SITES OF RIGHT BREAST IN FEMALE, ESTROGEN RECEPTOR NEGATIVE (HCC): ICD-10-CM

## 2020-09-14 DIAGNOSIS — C50.811 MALIGNANT NEOPLASM OF OVERLAPPING SITES OF RIGHT BREAST IN FEMALE, ESTROGEN RECEPTOR NEGATIVE (HCC): ICD-10-CM

## 2020-09-14 LAB
ALBUMIN SERPL-MCNC: 4.1 G/DL (ref 3.5–5.2)
ALBUMIN/GLOB SERPL: 1.6 G/DL
ALP SERPL-CCNC: 56 U/L (ref 39–117)
ALT SERPL W P-5'-P-CCNC: 29 U/L (ref 1–33)
ANION GAP SERPL CALCULATED.3IONS-SCNC: 7.2 MMOL/L (ref 5–15)
AST SERPL-CCNC: 30 U/L (ref 1–32)
BASOPHILS # BLD AUTO: 0.05 10*3/MM3 (ref 0–0.2)
BASOPHILS NFR BLD AUTO: 1.5 % (ref 0–1.5)
BILIRUB SERPL-MCNC: 0.7 MG/DL (ref 0–1.2)
BUN SERPL-MCNC: 20 MG/DL (ref 6–20)
BUN/CREAT SERPL: 23 (ref 7–25)
CALCIUM SPEC-SCNC: 9.1 MG/DL (ref 8.6–10.5)
CHLORIDE SERPL-SCNC: 105 MMOL/L (ref 98–107)
CO2 SERPL-SCNC: 28.8 MMOL/L (ref 22–29)
CREAT SERPL-MCNC: 0.87 MG/DL (ref 0.57–1)
DEPRECATED RDW RBC AUTO: 54.1 FL (ref 37–54)
EOSINOPHIL # BLD AUTO: 0.23 10*3/MM3 (ref 0–0.4)
EOSINOPHIL NFR BLD AUTO: 7 % (ref 0.3–6.2)
ERYTHROCYTE [DISTWIDTH] IN BLOOD BY AUTOMATED COUNT: 14.8 % (ref 12.3–15.4)
GFR SERPL CREATININE-BSD FRML MDRD: 67 ML/MIN/1.73
GLOBULIN UR ELPH-MCNC: 2.5 GM/DL
GLUCOSE SERPL-MCNC: 96 MG/DL (ref 65–99)
HCT VFR BLD AUTO: 38.5 % (ref 34–46.6)
HGB BLD-MCNC: 12.7 G/DL (ref 12–15.9)
IMM GRANULOCYTES # BLD AUTO: 0.01 10*3/MM3 (ref 0–0.05)
IMM GRANULOCYTES NFR BLD AUTO: 0.3 % (ref 0–0.5)
LYMPHOCYTES # BLD AUTO: 0.83 10*3/MM3 (ref 0.7–3.1)
LYMPHOCYTES NFR BLD AUTO: 25.3 % (ref 19.6–45.3)
MAGNESIUM SERPL-MCNC: 2.2 MG/DL (ref 1.6–2.6)
MCH RBC QN AUTO: 33.2 PG (ref 26.6–33)
MCHC RBC AUTO-ENTMCNC: 33 G/DL (ref 31.5–35.7)
MCV RBC AUTO: 100.8 FL (ref 79–97)
MONOCYTES # BLD AUTO: 0.34 10*3/MM3 (ref 0.1–0.9)
MONOCYTES NFR BLD AUTO: 10.4 % (ref 5–12)
NEUTROPHILS NFR BLD AUTO: 1.82 10*3/MM3 (ref 1.7–7)
NEUTROPHILS NFR BLD AUTO: 55.5 % (ref 42.7–76)
NRBC BLD AUTO-RTO: 0 /100 WBC (ref 0–0.2)
PHOSPHATE SERPL-MCNC: 3.5 MG/DL (ref 2.5–4.5)
PLATELET # BLD AUTO: 207 10*3/MM3 (ref 140–450)
PMV BLD AUTO: 10.6 FL (ref 6–12)
POTASSIUM SERPL-SCNC: 4.7 MMOL/L (ref 3.5–5.2)
PROT SERPL-MCNC: 6.6 G/DL (ref 6–8.5)
RBC # BLD AUTO: 3.82 10*6/MM3 (ref 3.77–5.28)
SODIUM SERPL-SCNC: 141 MMOL/L (ref 136–145)
WBC # BLD AUTO: 3.28 10*3/MM3 (ref 3.4–10.8)

## 2020-09-14 PROCEDURE — 83735 ASSAY OF MAGNESIUM: CPT | Performed by: INTERNAL MEDICINE

## 2020-09-14 PROCEDURE — 84100 ASSAY OF PHOSPHORUS: CPT | Performed by: INTERNAL MEDICINE

## 2020-09-14 PROCEDURE — 80053 COMPREHEN METABOLIC PANEL: CPT | Performed by: INTERNAL MEDICINE

## 2020-09-14 PROCEDURE — 99215 OFFICE O/P EST HI 40 MIN: CPT | Performed by: INTERNAL MEDICINE

## 2020-09-14 PROCEDURE — 96372 THER/PROPH/DIAG INJ SC/IM: CPT

## 2020-09-14 PROCEDURE — 85025 COMPLETE CBC W/AUTO DIFF WBC: CPT | Performed by: INTERNAL MEDICINE

## 2020-09-14 PROCEDURE — 36415 COLL VENOUS BLD VENIPUNCTURE: CPT

## 2020-09-14 PROCEDURE — 25010000002 DENOSUMAB 120 MG/1.7ML SOLUTION: Performed by: INTERNAL MEDICINE

## 2020-09-14 RX ORDER — LORAZEPAM 1 MG/1
1 TABLET ORAL TAKE AS DIRECTED
Qty: 2 TABLET | Refills: 0 | Status: SHIPPED | OUTPATIENT
Start: 2020-09-14 | End: 2020-12-02

## 2020-09-14 RX ORDER — SUCRALFATE 1 G/1
1 TABLET ORAL 4 TIMES DAILY
Qty: 120 TABLET | Refills: 2 | Status: SHIPPED | OUTPATIENT
Start: 2020-09-14 | End: 2021-09-20

## 2020-09-14 RX ADMIN — DENOSUMAB 120 MG: 120 INJECTION SUBCUTANEOUS at 13:58

## 2020-09-14 NOTE — TELEPHONE ENCOUNTER
Spoke with patient by phone to let her know prescription had been sent to [facility] for Ativan 1 mg, 2 tablets, to take one 30 minutes before MRI and may take one more if needed.  Patient very pleasant and verbalized understanding. DL

## 2020-09-15 ENCOUNTER — MEDICATION THERAPY MANAGEMENT (OUTPATIENT)
Dept: PHARMACY | Facility: HOSPITAL | Age: 60
End: 2020-09-15

## 2020-09-15 NOTE — PROGRESS NOTES
[facility] Lab Review- Capecitabine      9/14/2020   WBC 3.40 - 10.80 10*3/mm3 3.28 (A)   Neutrophils Absolute 1.70 - 7.00 10*3/mm3 1.82   Hemoglobin 12.0 - 15.9 g/dL 12.7   Hematocrit 34.0 - 46.6 % 38.5   Platelets 140 - 450 10*3/mm3 207   Creatinine 0.57 - 1.00 mg/dL 0.87   eGFR Non African Am >60 mL/min/1.73 67   BUN 6 - 20 mg/dL 20   Sodium 136 - 145 mmol/L 141   Potassium 3.5 - 5.2 mmol/L 4.7   Glucose 65 - 99 mg/dL 96   Magnesium 1.6 - 2.6 mg/dL 2.2   Calcium 8.6 - 10.5 mg/dL 9.1   Albumin 3.50 - 5.20 g/dL 4.10   Total Protein 6.0 - 8.5 g/dL 6.6   AST (SGOT) 1 - 32 U/L 30   ALT (SGPT) 1 - 33 U/L 29   Alkaline Phosphatase 39 - 117 U/L 56   Total Bilirubin 0.0 - 1.2 mg/dL 0.7     MD dictation noted. Pharmacy will continue to follow.    Thanks,   Lubna Gupta, PharmD

## 2020-09-18 ENCOUNTER — MEDICATION THERAPY MANAGEMENT (OUTPATIENT)
Dept: PHARMACY | Facility: HOSPITAL | Age: 60
End: 2020-09-18

## 2020-09-18 NOTE — PROGRESS NOTES
MTM telephone follow up- Capecitabine    Martha is doing well today. She states that side effects have remained about the same. She continues to utilize CBD balm and other moisturizers on her hands and feet. Medication administration and adherence seem appropriate; she reports no missed doses this past month. She is having some reflux issues and MD has prescribed sucralfate; she has not started this yet as it is coming by mail order pharmacy. Additionally, she will have MRI on Monday 9/21. She has no additional questions or concerns for me today. Pharmacy will continue to follow.     Thanks,   Lubna Gupta, PharmD

## 2020-09-20 RX ORDER — TRAMADOL HYDROCHLORIDE 50 MG/1
TABLET ORAL
Qty: 90 TABLET | Refills: 1 | Status: SHIPPED | OUTPATIENT
Start: 2020-09-20 | End: 2020-10-12 | Stop reason: SDUPTHER

## 2020-09-21 ENCOUNTER — TELEPHONE (OUTPATIENT)
Dept: INTERNAL MEDICINE | Facility: CLINIC | Age: 60
End: 2020-09-21

## 2020-09-21 ENCOUNTER — SPECIALTY PHARMACY (OUTPATIENT)
Dept: PHARMACY | Facility: HOSPITAL | Age: 60
End: 2020-09-21

## 2020-09-21 ENCOUNTER — HOSPITAL ENCOUNTER (OUTPATIENT)
Dept: MRI IMAGING | Facility: HOSPITAL | Age: 60
Discharge: HOME | End: 2020-09-21
Admitting: INTERNAL MEDICINE

## 2020-09-21 DIAGNOSIS — C50.811 MALIGNANT NEOPLASM OF OVERLAPPING SITES OF RIGHT BREAST IN FEMALE, ESTROGEN RECEPTOR NEGATIVE (HCC): ICD-10-CM

## 2020-09-21 DIAGNOSIS — Z17.1 MALIGNANT NEOPLASM OF OVERLAPPING SITES OF RIGHT BREAST IN FEMALE, ESTROGEN RECEPTOR NEGATIVE (HCC): ICD-10-CM

## 2020-09-21 PROCEDURE — A9575 INJ GADOTERATE MEGLUMI 0.1ML: HCPCS | Performed by: INTERNAL MEDICINE

## 2020-09-21 PROCEDURE — 25010000002 GADOTERATE MEGLUMINE 7.5 MMOL/15ML SOLUTION: Performed by: INTERNAL MEDICINE

## 2020-09-21 PROCEDURE — 73720 MRI LWR EXTREMITY W/O&W/DYE: CPT

## 2020-09-21 RX ORDER — GADOTERATE MEGLUMINE 376.9 MG/ML
15 INJECTION INTRAVENOUS
Status: COMPLETED | OUTPATIENT
Start: 2020-09-21 | End: 2020-09-21

## 2020-09-21 RX ADMIN — GADOTERATE MEGLUMINE 15 ML: 376.9 INJECTION, SOLUTION INTRAVENOUS at 18:01

## 2020-09-21 NOTE — TELEPHONE ENCOUNTER
Patient wanted to give you an update-    She is going for an MRI at [facility]  Today at 5pm    Also, thank you for refilling tramadol.

## 2020-09-23 ENCOUNTER — TELEPHONE (OUTPATIENT)
Dept: ONCOLOGY | Facility: HOSPITAL | Age: 60
End: 2020-09-23

## 2020-09-23 NOTE — TELEPHONE ENCOUNTER
Pt did not answer. Message elft letting her know the MRI showed no cancer involvment, just tendonitis. Advised her to call back with any questions or concerns.

## 2020-09-23 NOTE — TELEPHONE ENCOUNTER
----- Message from Ubaldo Hancock Jr., MD sent at 9/22/2020  5:20 PM EDT -----  Please notify patient that MRI showed tendinitis, no evidence of cancer involvement  ----- Message -----  From: Interface, Rad Results [facility]  Sent: 9/22/2020   5:13 PM EDT  To: Ubaldo Hancock Jr., MD

## 2020-10-12 ENCOUNTER — LAB (OUTPATIENT)
Dept: OTHER | Facility: HOSPITAL | Age: 60
End: 2020-10-12

## 2020-10-12 ENCOUNTER — INFUSION (OUTPATIENT)
Dept: ONCOLOGY | Facility: HOSPITAL | Age: 60
End: 2020-10-12

## 2020-10-12 ENCOUNTER — OFFICE VISIT (OUTPATIENT)
Dept: ONCOLOGY | Facility: CLINIC | Age: 60
End: 2020-10-12

## 2020-10-12 VITALS
HEART RATE: 110 BPM | BODY MASS INDEX: 35.89 KG/M2 | DIASTOLIC BLOOD PRESSURE: 76 MMHG | WEIGHT: 190.1 LBS | TEMPERATURE: 96.2 F | HEIGHT: 61 IN | OXYGEN SATURATION: 97 % | RESPIRATION RATE: 18 BRPM | SYSTOLIC BLOOD PRESSURE: 120 MMHG

## 2020-10-12 DIAGNOSIS — Z17.1 MALIGNANT NEOPLASM OF OVERLAPPING SITES OF RIGHT BREAST IN FEMALE, ESTROGEN RECEPTOR NEGATIVE (HCC): Primary | ICD-10-CM

## 2020-10-12 DIAGNOSIS — Z17.1 MALIGNANT NEOPLASM OF OVERLAPPING SITES OF RIGHT BREAST IN FEMALE, ESTROGEN RECEPTOR NEGATIVE (HCC): ICD-10-CM

## 2020-10-12 DIAGNOSIS — C50.811 MALIGNANT NEOPLASM OF OVERLAPPING SITES OF RIGHT BREAST IN FEMALE, ESTROGEN RECEPTOR NEGATIVE (HCC): Primary | ICD-10-CM

## 2020-10-12 DIAGNOSIS — C50.811 MALIGNANT NEOPLASM OF OVERLAPPING SITES OF RIGHT BREAST IN FEMALE, ESTROGEN RECEPTOR NEGATIVE (HCC): ICD-10-CM

## 2020-10-12 DIAGNOSIS — C79.51 BONE METASTASES: ICD-10-CM

## 2020-10-12 LAB
ALBUMIN SERPL-MCNC: 4.2 G/DL (ref 3.5–5.2)
ALBUMIN/GLOB SERPL: 1.8 G/DL
ALP SERPL-CCNC: 53 U/L (ref 39–117)
ALT SERPL W P-5'-P-CCNC: 21 U/L (ref 1–33)
ANION GAP SERPL CALCULATED.3IONS-SCNC: 7.8 MMOL/L (ref 5–15)
AST SERPL-CCNC: 24 U/L (ref 1–32)
BASOPHILS # BLD AUTO: 0.06 10*3/MM3 (ref 0–0.2)
BASOPHILS NFR BLD AUTO: 1.7 % (ref 0–1.5)
BILIRUB SERPL-MCNC: 0.5 MG/DL (ref 0–1.2)
BUN SERPL-MCNC: 28 MG/DL (ref 6–20)
BUN/CREAT SERPL: 31.1 (ref 7–25)
CALCIUM SPEC-SCNC: 9.5 MG/DL (ref 8.6–10.5)
CHLORIDE SERPL-SCNC: 105 MMOL/L (ref 98–107)
CO2 SERPL-SCNC: 27.2 MMOL/L (ref 22–29)
CREAT SERPL-MCNC: 0.9 MG/DL (ref 0.57–1)
DEPRECATED RDW RBC AUTO: 56.3 FL (ref 37–54)
EOSINOPHIL # BLD AUTO: 0.27 10*3/MM3 (ref 0–0.4)
EOSINOPHIL NFR BLD AUTO: 7.8 % (ref 0.3–6.2)
ERYTHROCYTE [DISTWIDTH] IN BLOOD BY AUTOMATED COUNT: 15.4 % (ref 12.3–15.4)
GFR SERPL CREATININE-BSD FRML MDRD: 64 ML/MIN/1.73
GLOBULIN UR ELPH-MCNC: 2.3 GM/DL
GLUCOSE SERPL-MCNC: 113 MG/DL (ref 65–99)
HCT VFR BLD AUTO: 37.8 % (ref 34–46.6)
HGB BLD-MCNC: 12.3 G/DL (ref 12–15.9)
IMM GRANULOCYTES # BLD AUTO: 0.01 10*3/MM3 (ref 0–0.05)
IMM GRANULOCYTES NFR BLD AUTO: 0.3 % (ref 0–0.5)
LYMPHOCYTES # BLD AUTO: 0.83 10*3/MM3 (ref 0.7–3.1)
LYMPHOCYTES NFR BLD AUTO: 23.9 % (ref 19.6–45.3)
MAGNESIUM SERPL-MCNC: 2.1 MG/DL (ref 1.6–2.6)
MCH RBC QN AUTO: 32.7 PG (ref 26.6–33)
MCHC RBC AUTO-ENTMCNC: 32.5 G/DL (ref 31.5–35.7)
MCV RBC AUTO: 100.5 FL (ref 79–97)
MONOCYTES # BLD AUTO: 0.34 10*3/MM3 (ref 0.1–0.9)
MONOCYTES NFR BLD AUTO: 9.8 % (ref 5–12)
NEUTROPHILS NFR BLD AUTO: 1.97 10*3/MM3 (ref 1.7–7)
NEUTROPHILS NFR BLD AUTO: 56.5 % (ref 42.7–76)
NRBC BLD AUTO-RTO: 0 /100 WBC (ref 0–0.2)
PHOSPHATE SERPL-MCNC: 4 MG/DL (ref 2.5–4.5)
PLATELET # BLD AUTO: 229 10*3/MM3 (ref 140–450)
PMV BLD AUTO: 10.9 FL (ref 6–12)
POTASSIUM SERPL-SCNC: 4.8 MMOL/L (ref 3.5–5.2)
PROT SERPL-MCNC: 6.5 G/DL (ref 6–8.5)
RBC # BLD AUTO: 3.76 10*6/MM3 (ref 3.77–5.28)
SODIUM SERPL-SCNC: 140 MMOL/L (ref 136–145)
WBC # BLD AUTO: 3.48 10*3/MM3 (ref 3.4–10.8)

## 2020-10-12 PROCEDURE — 25010000002 DENOSUMAB 120 MG/1.7ML SOLUTION: Performed by: NURSE PRACTITIONER

## 2020-10-12 PROCEDURE — 96372 THER/PROPH/DIAG INJ SC/IM: CPT

## 2020-10-12 PROCEDURE — 99213 OFFICE O/P EST LOW 20 MIN: CPT | Performed by: NURSE PRACTITIONER

## 2020-10-12 PROCEDURE — 83735 ASSAY OF MAGNESIUM: CPT | Performed by: INTERNAL MEDICINE

## 2020-10-12 PROCEDURE — 84100 ASSAY OF PHOSPHORUS: CPT | Performed by: INTERNAL MEDICINE

## 2020-10-12 PROCEDURE — 36415 COLL VENOUS BLD VENIPUNCTURE: CPT

## 2020-10-12 PROCEDURE — 85025 COMPLETE CBC W/AUTO DIFF WBC: CPT | Performed by: INTERNAL MEDICINE

## 2020-10-12 PROCEDURE — 80053 COMPREHEN METABOLIC PANEL: CPT | Performed by: INTERNAL MEDICINE

## 2020-10-12 RX ORDER — TEMAZEPAM 15 MG/1
15 CAPSULE ORAL NIGHTLY PRN
Qty: 90 CAPSULE | Refills: 1 | Status: SHIPPED | OUTPATIENT
Start: 2020-10-12 | End: 2021-04-16 | Stop reason: SDUPTHER

## 2020-10-12 RX ORDER — TRAMADOL HYDROCHLORIDE 50 MG/1
50 TABLET ORAL EVERY 8 HOURS PRN
Qty: 90 TABLET | Refills: 1 | Status: SHIPPED | OUTPATIENT
Start: 2020-10-12 | End: 2021-01-04

## 2020-10-12 RX ADMIN — DENOSUMAB 120 MG: 120 INJECTION SUBCUTANEOUS at 09:37

## 2020-10-14 NOTE — PROGRESS NOTES
Subjective .     REASONS FOR FOLLOWUP:    1. Stage Ib ([id number]) right breast cancer: Diagnosed May 2010 with excisional biopsy for invasive ductal carcinoma, 1.3 cm, grade 2, ER 90%, AL 80%, HER-2/peter negative (1+ IHC).  Subsequent right mastectomy in July 2010 with no residual breast malignancy, 1/5 sentinel lymph node with micrometastasis (0.25 mm).  Treated in the [facility] system with adjuvant AC ×4 cycles in 2010 (no taxanes administered due to underlying Charcot-Saloni-Tooth with peripheral neuropathy).  Adjuvant Femara (postmenopausal) initiated October 2010 with plan to treat ×10 years.  Genetic testing reportedly negative.  Developed osteopenia treated with Prolia beginning 2/27/13.  2. Recurrent/metastatic disease identified 10/8/17 involving thoracic spine with cord compression at T6, lumbosacral involvement, sternal and right sternoclavicular involvement.  Femara discontinued.  Radiation administered (in the [facility] system) to the thoracic spine beginning 10/19/17 treating T3-T9 to a dose of 24 gray in 6 fractions.  3. Evaluation with MRI 12/8/17 showing persistent T6 cord compression with persistent neurologic compromise requiring surgical treatment 12/11/17 with T6 laminectomy/corpectomy and T3-T9 fusion.  Pathology with metastatic carcinoma of breast origin, ER negative, AL negative, HER-2/peter negative (1+ IHC).  4. Right pulmonary embolism 10/21/17 treated with Lovenox 1 mg/kg (100 mg) every 12 hours.  No evidence of DVT.  Lovenox held due to right gluteus hematoma 3/23/18 through 4/6/18.  Transitioned to oral Eliquis 5mg bid 1/28/19 (as of 2/25/19, patient still using previous supply of Lovenox).  5. Cancer related pain previously on Duragesic 50 µg patch every 72 hours and Dilaudid 4 mg as needed for breakthrough pain.  Narcotics subsequently discontinued with improvement in pain in January 2018.  6. Radiation therapy to L3 dural metastasis and left humerus initiated 1/15/18 (10 fractions),  completed 1/26/18  7. Monthly Xgeva initiated 1/23/18  8. Mild hypercalcemia of malignancy  9. Initiation of palliative oral single agent Xeloda 2/7/18 (2000 mg a.m., 1500 mg p.m. for 14/21 days).  10. 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  In reviewing prior CT scans, has been present for some time.  Monitor for now, if it enlarges consider surgical excision.  11. Cumulative side effects from Xeloda with fatigue, anorexia, diarrhea, increased tearing.  Alteration in dose/schedule with cycle 11 to 1500 mg twice a day for 7 days on followed by 7 days off.    HISTORY OF PRESENT ILLNESS:  The patient is a 59 y.o. year old female who is here for follow-up with the above-mentioned history.  The patient continues on Xeloda    History of Present Illness the patient is a 59-year-old female with the above-mentioned history who is here today for lab review and evaluation continuing on oral Xeloda 1500 mg twice daily 7 days on, followed by 7 days off.  She also continues on monthly Xgeva, which is due today.  Overall, she is tolerating Xeloda reasonably well.  She does have some dryness of her skin on her hands.  She states that her feet are unaffected.  She is using moisturizers regularly, and tries to sleep with gloves on.  She continues to be active as well.  She is ambulating with the use of the cane.  She states that she is trying to walk more regularly.  She denies any significant issues with nausea, vomiting, diarrhea, or constipation.    Past Medical History:   Diagnosis Date   • Acute pulmonary embolism, cancer-related 10/2017   • Allergic rhinitis    • Arthritis     Right knee; left shoulder   • Cancer (CMS/HCC) 05/2010    Right breast   • Cancer (CMS/HCC) 10/2017    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   • Charcot-Saloni-Tooth disease    • CPAP  (continuous positive airway pressure) dependence    • GERD (gastroesophageal reflux disease)    • H/O Colon polyps    • H/O Uterine fibroid    • Heart murmur    • Hyperlipidemia    • Hypertension    • PONV (postoperative nausea and vomiting)      Past Surgical History:   Procedure Laterality Date   • BLADDER SURGERY      Bladder lift   • BREAST RECONSTRUCTION, BREAST TISSUE EXPANDER INSERTION Right 2010   • BREAST SURGERY Right 2010    Mastectomy   •  SECTION  ,    • CHOLECYSTECTOMY     • COLONOSCOPY     • HERNIA REPAIR  2015    [facility]   • HYSTERECTOMY      Partial   • KNEE SURGERY Right    • LEG SURGERY Left     Broken   • MASTECTOMY Right 2010   • CO TREAT TIBIAL SHAFT FX, INTRAMED IMPLANT Left 2017    Procedure: TIBIA INTRAMEDULLARY NAIL/DON INSERTION;  Surgeon: Romero Shanks MD;  Location: [facility];  Service: Orthopedics   • THORACIC DECOMPRESSION POSTERIOR FUSION WITH INSTRUMENTATION N/A 2017    Procedure: T6 costotransversectomy and decompression with T3 to  T9 posterior spinal fusion with instrumentation with Jennifer Gonzalez and Nael Dennis Cellsaver;  Surgeon: Quan Nayak MD;  Location: [facility];  Service:        ONCOLOGIC HISTORY:  (History from previous dates can be found in the separate document.)    Current Outpatient Medications on File Prior to Visit   Medication Sig Dispense Refill   • acetaminophen (TYLENOL) 500 MG tablet Take 500 mg by mouth Every 6 (Six) Hours As Needed for Mild Pain .     • calcium carbonate (OS-CARY) 600 MG tablet Take 600 mg by mouth 2 (Two) Times a Day With Meals.     • capecitabine (XELODA) 500 MG chemo tablet Take 3 tabs (1500 mg) by mouth twice a day for 7 days on then 7 days off. 84 tablet 3   • cholecalciferol (VITAMIN D3) 1000 units tablet Take 1,000 Units by mouth Daily.     • diazePAM (VALIUM) 10 MG tablet Take 1 tablet by mouth Every 12 (Twelve) Hours As Needed for Anxiety. 10 tablet 1   • diphenoxylate-atropine  (LOMOTIL) 2.5-0.025 MG per tablet Take 1 tablet by mouth 4 (Four) Times a Day As Needed for Diarrhea. 60 tablet 0   • ELIQUIS 5 MG tablet tablet TAKE 1 TABLET BY MOUTH EVERY 12 HOURS 60 tablet 6   • FLONASE ALLERGY RELIEF 50 MCG/ACT nasal spray 2 sprays into each nostril daily.  11   • gabapentin (Neurontin) 300 MG capsule Take 1 capsule by mouth 2 (Two) Times a Day. 180 capsule 1   • ketoconazole (NIZORAL) 2 % cream Apply  topically to the appropriate area as directed Daily. 15 g 0   • LORazepam (Ativan) 1 MG tablet Take 1 tablet by mouth Take As Directed for 2 doses. Take one tablet 30 minutes before scheduled test, may repeat x1 if needed 2 tablet 0   • losartan (COZAAR) 50 MG tablet TAKE 1 TABLET BY MOUTH DAILY 90 tablet 2   • mesalamine (LIALDA) 1.2 g EC tablet TAKE 1 TABLET BY MOUTH TWICE DAILY 180 tablet 0   • metaxalone (SKELAXIN) 800 MG tablet Take 1 tablet by mouth 3 (Three) Times a Day As Needed for Muscle Spasms. 30 tablet 3   • mupirocin (BACTROBAN) 2 % ointment JUANITA TO SURGICAL SITE AND COVER WITH BAND AID D UNTIL HEALED     • omeprazole (PriLOSEC) 40 MG capsule TAKE 1 CAPSULE DAILY 90 capsule 2   • ondansetron ODT (ZOFRAN-ODT) 8 MG disintegrating tablet Take 1 tablet by mouth Every 8 (Eight) Hours As Needed for Nausea or Vomiting. 30 tablet 1   • phenazopyridine (PYRIDIUM) 200 MG tablet Take 200 mg by mouth 3 (Three) Times a Day As Needed for bladder spasms.     • prochlorperazine (COMPAZINE) 10 MG tablet Take 1 tablet by mouth Every 6 (Six) Hours As Needed for Nausea or Vomiting. 30 tablet 0   • sennosides-docusate sodium (SENOKOT-S) 8.6-50 MG tablet Take 2 tablets by mouth 2 (Two) Times a Day. 90 tablet 2   • sucralfate (CARAFATE) 1 g tablet Take 1 tablet by mouth 4 (Four) Times a Day. 120 tablet 2   • triamcinolone (KENALOG) 0.1 % cream Apply  topically to the appropriate area as directed 2 (Two) Times a Day. 15 g 0   • trimethoprim (TRIMPEX) 100 MG tablet Take 100 mg by mouth Daily.     • XIIDRA 5  % ophthalmic solution INSTILL 1 DROP INTO EACH EYE BID.       No current facility-administered medications on file prior to visit.        ALLERGIES:     Allergies   Allergen Reactions   • Hydrocodone Nausea Only   • Morphine And Related Nausea And Vomiting     Social History     Socioeconomic History   • Marital status:      Spouse name: Murray   • Number of children: 3   • Years of education: College   • Highest education level: Not on file   Occupational History     Employer: [employer]     Employer: RETIRED   Tobacco Use   • Smoking status: Never Smoker   • Smokeless tobacco: Never Used   Substance and Sexual Activity   • Alcohol use: Yes     Comment: social   • Drug use: No   • Sexual activity: Defer     Family History   Problem Relation Age of Onset   • Heart disease Mother    • Hyperlipidemia Mother    • Hypertension Mother    • Diabetes Father    • Charcot-Saloni-Tooth disease Father    • Heart disease Father    • Hypertension Father    • Heart failure Father    • Diabetes Sister    • Heart disease Sister    • Hypertension Sister    • Heart disease Maternal Aunt    • Scoliosis Maternal Aunt    • Heart disease Maternal Uncle    • Diabetes Maternal Grandmother    • Heart disease Maternal Grandmother    • Hypertension Maternal Grandmother    • Uterine cancer Maternal Grandmother    • Heart disease Maternal Grandfather      Review of Systems   Constitutional: Positive for fatigue and unexpected weight change. Negative for activity change and fever.   HENT: Negative for congestion, mouth sores, nosebleeds, sore throat and voice change.    Eyes: Negative for discharge, itching and visual disturbance.   Respiratory: Negative for cough, shortness of breath and wheezing.    Cardiovascular: Negative for chest pain, palpitations and leg swelling.   Gastrointestinal: Negative for abdominal distention, abdominal pain, blood in stool, constipation, diarrhea, nausea and vomiting.   Endocrine: Negative for cold  intolerance and heat intolerance.   Genitourinary: Negative for difficulty urinating, dysuria, flank pain, frequency, hematuria and urgency.   Musculoskeletal: Negative for arthralgias, back pain, joint swelling and myalgias.   Skin: Positive for rash. Negative for wound.   Neurological: Negative for dizziness, syncope, weakness, light-headedness, numbness and headaches.   Hematological: Negative for adenopathy. Does not bruise/bleed easily.   Psychiatric/Behavioral: Positive for sleep disturbance. Negative for confusion. The patient is not nervous/anxious.    10/12/2020 ROS unchanged from above except as updated.      Objective      Vitals:    10/12/20 0850   BP: 120/76   Pulse: 110   Resp: 18   Temp: 96.2 °F (35.7 °C)   SpO2: 97%      Current Status 10/12/2020   ECOG score 1   Pain 0/10    Physical Exam   Constitutional: She is oriented to person, place, and time. She appears well-developed.   Eyes: Conjunctivae are normal.   Neck: No thyromegaly present.   Cardiovascular: Normal rate and regular rhythm. Exam reveals no gallop and no friction rub.   No murmur heard.  Pulmonary/Chest: Breath sounds normal. No respiratory distress.   Abdominal: Soft. Bowel sounds are normal. She exhibits no distension. There is no abdominal tenderness.   Musculoskeletal:      Comments: Lower extremity braces in place.  No significant lower extremity edema   Neurological: She is alert and oriented to person, place, and time. She displays normal reflexes. No cranial nerve deficit.   Skin: Skin is warm and dry. Rash noted.   Mild erythema and dryness involving the palms of the hands.  Slight skin cracking in the hands.    Psychiatric: Her behavior is normal.     10/13/2020 physical exam unchanged from above except as updated.    RECENT LABS:  Hematology WBC   Date Value Ref Range Status   10/12/2020 3.48 3.40 - 10.80 10*3/mm3 Final   11/04/2019 4.09 3.40 - 10.80 10*3/mm3 Final     RBC   Date Value Ref Range Status   10/12/2020 3.76 (L)  3.77 - 5.28 10*6/mm3 Final   11/04/2019 4.26 3.77 - 5.28 10*6/mm3 Final     Hemoglobin   Date Value Ref Range Status   10/12/2020 12.3 12.0 - 15.9 g/dL Final     Hematocrit   Date Value Ref Range Status   10/12/2020 37.8 34.0 - 46.6 % Final     Platelets   Date Value Ref Range Status   10/12/2020 229 140 - 450 10*3/mm3 Final        Lab Results   Component Value Date    GLUCOSE 113 (H) 10/12/2020    BUN 28 (H) 10/12/2020    CREATININE 0.90 10/12/2020    EGFRIFNONA 64 10/12/2020    EGFRIFAFRI 66 11/04/2019    BCR 31.1 (H) 10/12/2020    K 4.8 10/12/2020    CO2 27.2 10/12/2020    CALCIUM 9.5 10/12/2020    PROTENTOTREF 6.3 11/04/2019    ALBUMIN 4.20 10/12/2020    LABIL2 2.3 11/04/2019    AST 24 10/12/2020    ALT 21 10/12/2020     Reviewed CT chest abdomen pelvis and bone scan as outlined above and below.  I did personally review CT images.  I discussed the bone scan findings on the telephone with Dr. Ortega in radiology.    Assessment/Plan      1. Previous Stage Ib ([id number]) right breast cancer:  · Diagnosed May 2010 with excisional biopsy for invasive ductal carcinoma, 1.3 cm, grade 2, ER 90%, OR 80%, HER-2/peter negative (1+ IHC).    · Subsequent right mastectomy in July 2010 with no residual breast malignancy, 1/5 sentinel lymph node with micrometastasis (0.25 mm).    · Treated in the [facility] system with adjuvant AC ×4 cycles in 2010 (no taxanes administered due to underlying Charcot-Saloni-Tooth with peripheral neuropathy).    · Adjuvant Femara (postmenopausal) initiated October 2010 with plan to treat ×10 years.    · Genetic testing reportedly negative.    · Developed osteopenia treated with Prolia beginning 2/27/13. Subsequently discontinued due to identification of metastatic disease.  2. Recurrent/metastatic disease identified 10/8/17:  · Disease involving thoracic spine with cord compression at T6, lumbosacral involvement, sternal and right sternoclavicular involvement.    · Femara discontinued in 10/2017.     · Radiation administered (in the [facility] system) to the thoracic spine beginning 10/19/17 treating T3-T9 to a dose of 24 gray in 6 fractions.  · Evaluation with MRI 12/8/17 showing persistent T6 cord compression with persistent neurologic compromise requiring surgical treatment 12/11/17 with T6 laminectomy/corpectomy and T3-T9 fusion.  Pathology with metastatic carcinoma of breast origin, ER negative, MA negative, HER-2/peter negative (1+ IHC).  · Additional staging evaluation 12/8/17 with no evidence of visceral metastatic disease, bone scan showing involvement of thoracic spine, sternum, left humerus, mid frontal bone.  No plane film correlate of left humerus lesion.  MRI lumbar spine with small intradural L3 metastasis.  CA 15-3 12/6/17- 17.  · Palliative radiation therapy to L3 dural metastasis and left humerus initiated 1/15/18 (10 fractions), completed 1/26/18.  · Hypercalcemia of malignancy with calcium in the 10-11 range.  · Initiation of monthly Xgeva 1/23/18.  · Baseline CT scan 1/30/18 with no evidence of visceral involvement.  Cluster of nodular opacities in the right lower lobe suspected to be infectious or related to bronchiolitis. Bone scan 1/30/18 showed postsurgical change in the thoracic spine, stable uptake in the frontal bone, no new areas of disease.  · Initiation of palliative oral single agent Xeloda 2/7/18 2000 mg a.m., 1500 mg p.m. for 14/21 days.   · Following 3 cycles xeloda, bone scan 4/4/18 showed no change from the prior study.  CT scan 4/4/18 showed a small pericardial effusion of unclear significance as well as a subcutaneous nodule in the right lateral chest wall.  Subsequent evaluation with echocardiogram 4/17/18 showed no evidence of pericardial effusion.  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  We discussed the possibility of surgical  excision of the right subcutaneous chest wall lesion for more definitive diagnosis.  Reviewed previous CT images dating back to 12/8/17 and the lesion was present even at that time measuring around 1.7 cm although not commented on in the radiology report.  As this appears to be an indolent low-grade process unrelated to her breast cancer, recommendee foregoing surgical excision at this time and monitoring this area on future scans.  The patient agreed.    · Following 6 cycles of Xeloda, CT 6/6/18  showed stable findings, no evidence of progressive disease.  There was a comment regarding subcutaneous abnormality in the anterior abdominal wall and this was related to Lovenox injection sites.  Bone scan 6/6/18 showed no interval change.   · CT scan and bone scan 8/13/18 following 9 cycles of Xeloda showed stable findings with no evidence of significant visceral metastases.  Her bone lesions appear stable on bone scan.  The spindle cell neoplasm in her right chest wall actually decreased in size from 2 cm down to 1.6 cm.    · The patient experienced some symptoms of diarrhea, anorexia, generalized weakness during cycle 9 Xeloda it was unclear whether this was related to a viral gastroenteritis or toxicity from treatment.  Symptoms recurred during cycle 10 and treatment was cut short by 2 days.  Symptoms attributed to Xeloda.  With cycle 11, dose and schedule altered to 1500 mg twice daily for 7 days on followed by 7 days off .  · Most recent 3-month interval scans with CT scan 9/9/2020 with no significant changes.  Bone scan 9/9/2020 read as unchanged from prior studies however did note an area of slight activity in the medial left femur.  Contacted radiology and although this was not noted on prior reports appears to have been present.  Unclear etiology, recommended MRI for further evaluation.  · MRI left femur 9/21/2020 mild cortical thickening and periosteal edema and enhancement at the left iliac is muscle insertion  to the medial proximal left femur without any evidence of metastatic disease identified around the proximal femurs or the visualized pelvis.  Felt to be related to tendinitis.   · Returns 10/12/2020 due for Xgeva, continuing to tolerate Xeloda well with just mild hand/ foot symptoms.    3. Right pulmonary embolism:  · Diagnosed on CT angiogram 10/21/17 involving small right lower lobe pulmonary artery.  Lower extremity Dopplers negative.  · Bilateral lower extremity Dopplers negative again 12/5/17.  · Received chronic Lovenox 1 mg/kg twice per day, transition to oral Eliquis in February 2019, continuing on Eliquis 5 mg twice daily.  4. Cancer related pain:  · Previously receiving Duragesic 50 µg patch every 72 hours along with Dilaudid 4 mg as needed for breakthrough pain  · The patient's pain improved over time and she was able to discontinue both Duragesic and Dilaudid in the interval.  · Patient does take occasional Flexeril at bedtime due to back spasm/pain when she has been more active.  · The patient does have some occasional aggravation of her chronic back pain.    · No narcotic requirements recently.  5. Chemotherapy-induced diarrhea with subsequent C. difficile colitis in the setting of previous ulcerative colitis:  · Patient with reported history of ulcerative colitis, is not on active therapy.  · The patient developed initial diarrhea related to Xeloda at regular dosing.  · Symptoms improved on reduced dose Xeloda  · Flare of symptoms in October 2018 with apparent finding of C. difficile colitis by GI, treated with course of oral vancomycin with improvement in symptoms.  · Patient notes minimal intermittent diarrhea on Xeloda requiring occasional dosing of Imodium.    6. Traumatic left tibia/fibular fracture:  · Status post ORIF 12/6/17  · Specimen was sent for pathologic review, negative for evidence of malignancy  7. Hypercalcemia:  · Suspect hypercalcemia of malignancy, calcium in  10-11 range  previously.  · Calcium normalized following initiation of monthly Xgeva on 1/23/18.  8. Chemotherapy-induced mucositis:  · Patient had a minimal degree of mucositis with cycle 2.  The patient has magic mouthwash to use as needed.  No subsequent mucositis.  9. Recurrent UTI, bladder wall thickening on CT:  · Patient had an enterococcal UTI on 3/2/18 sensitive to nitrofurantoin and received treatment, unclear how long.  · Recurrent UTI 3/20/18 with urine culture growing Klebsiella, initially treated with nitrofurantoin, transitioned to Levaquin.  · CT 4/4/18 with diffuse bladder wall thickening with increased nodular thickening at the left base.  Referral to urogynecology Dr. May Johnson.  She was placed on a prophylactic dose of trimethoprim 100 mg daily, bladder wall thickening felt to be related to recent recurrent urinary tract infections.  · Patient with urinary symptoms, treated with course of Macrobid at the end of December 2018, urine culture however was negative 12/31/18.  · Patient was found to have Klebsiella UTI 7/29/2019 which was successfully treated with Macrobid with complete resolution of symptoms.  10.   Mobility:  · The patient underwent an intensive course of rehabilitation at [facility].  She graduated from her outpatient course November 2018.  · Overall the patient has improved dramatically in terms of mobility, now walking around 1 mile per day without assistance.  11.  Depression:  · The patient weaned herself off of Cymbalta and reports that her symptoms remain stable.  12. 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  Symptoms did improve and she continued on the same dose.    · Symptoms are currently stable.  13. Evidence of steatosis on scans with mild intermittent elevated liver function studies:  · Liver function  studies increased 8/20/19 with ALT 98, AST 70, normal total bilirubin.  · Negative viral hepatitis A, B, and C panel 8/23/18  · Likely related to hepatic steatosis.   · Subsequent improvement in LFTs  · Today, LFTs normal  14. Chemotherapy induced leukopenia:  · WBC today 3.28 with normal differential  15. GERD:  · The patient continues on omeprazole 40 mg daily (2 x 20 mg).  · Today the patient reports that her symptoms have been significant recently.  She notes that the symptoms are worse when lying down and do cause a cough.  We previously prescribed Carafate however she never obtained the medication.  I did recommend this again and sent in a new prescription for Carafate 1 g 4 times daily.  16. Insomnia:  · Patient with prior paradoxical reaction to Benadryl  · Improved previously on temazepam 15 mg nightly as needed.   · Patient noted subsequently that temazepam was having no effect.  She did increase gabapentin dosing to 600 mg nightly and this has helped.   17. Health maintenance:  · Patient notes that she has a history of colon polyps as well as ulcerative colitis and was due for a follow-up colonoscopy on 9/12/2019.  We did discuss there is no necessity to pursue colonoscopy in the setting of her metastatic breast cancer.    · The patient did undergo colonoscopy on 2/7/2020 with findings of muscular hypertrophy and diverticulosis.   18. Right shoulder pain:  · Patient was evaluated by Dr Forrester.  She has experienced difficulty over the past year and a half with her right shoulder although she has not complained of this in prior visits to our office.  She reports difficulty with abduction.    · The patient did undergo MRI of her right shoulder on 11/21/2019 at an outside facility showing multiple abnormalities including supraspinatus tendinosis, labral tear but no evidence of metastatic disease.  · Symptoms currently improved/resolved.  19. Ocular changes in part related to Xeloda:  · Patient experienced  a mild degree of blurred vision as well as burning and pruritus.  · She was seen by her ophthalmologist and has been continuing on xiidra ophthalmic drops which have helped.  · Likely both issues are to some extent related to Xeloda.  · Symptoms currently stable to improved.    Plan:  1. Continue oral Xeloda 1500 mg twice a day 7 days on, followed by 7 days off.  2. Monthly Xgeva today.  3. Continue Eliquis 5 mg twice daily.  4. Continue emollient cream on the hands and feet at least twice daily.  We discussed continuing to wear socks and gloves at night.  5. Continue omeprazole 40 mg daily and Carafate 1 g 4 times daily.  6. Return in 4 weeks for follow-up visit with Dr. Hancock with CBC, CMP, magnesium, phosphorus, due for her next dose of Xgeva.  7. Patient will have CT scan of the chest, abdomen, and pelvis as well as a bone scan in about 7 weeks.  8. Return in 8 weeks for follow-up visit again with Dr. Hancock with CBC, CMP, magnesium, phosphorus, Xgeva, and review scan results.      Glory Osborne, APRN  10/12/2020

## 2020-10-15 ENCOUNTER — MEDICATION THERAPY MANAGEMENT (OUTPATIENT)
Dept: PHARMACY | Facility: HOSPITAL | Age: 60
End: 2020-10-15

## 2020-10-15 NOTE — PROGRESS NOTES
[facility] Lab Review- Capecitabine      10/12/2020   WBC 3.40 - 10.80 10*3/mm3 3.48   Neutrophils Absolute 1.70 - 7.00 10*3/mm3 1.97   Hemoglobin 12.0 - 15.9 g/dL 12.3   Hematocrit 34.0 - 46.6 % 37.8   Platelets 140 - 450 10*3/mm3 229   Creatinine 0.57 - 1.00 mg/dL 0.90   eGFR Non African Am >60 mL/min/1.73 64   BUN 6 - 20 mg/dL 28 (A)   Sodium 136 - 145 mmol/L 140   Potassium 3.5 - 5.2 mmol/L 4.8   Glucose 65 - 99 mg/dL 113 (A)   Magnesium 1.6 - 2.6 mg/dL 2.1   Calcium 8.6 - 10.5 mg/dL 9.5   Albumin 3.50 - 5.20 g/dL 4.20   Total Protein 6.0 - 8.5 g/dL 6.5   AST (SGOT) 1 - 32 U/L 24   ALT (SGPT) 1 - 33 U/L 21   Alkaline Phosphatase 39 - 117 U/L 53   Total Bilirubin 0.0 - 1.2 mg/dL 0.5     APRN dictation noted. Pharmacy will continue to follow.     Thanks,   Lubna Gupta, PharmD

## 2020-10-18 RX ORDER — GABAPENTIN 300 MG/1
CAPSULE ORAL
Qty: 180 CAPSULE | Refills: 3 | Status: SHIPPED | OUTPATIENT
Start: 2020-10-18 | End: 2021-06-06

## 2020-10-20 ENCOUNTER — MEDICATION THERAPY MANAGEMENT (OUTPATIENT)
Dept: PHARMACY | Facility: HOSPITAL | Age: 60
End: 2020-10-20

## 2020-10-20 ENCOUNTER — SPECIALTY PHARMACY (OUTPATIENT)
Dept: PHARMACY | Facility: HOSPITAL | Age: 60
End: 2020-10-20

## 2020-10-20 NOTE — PROGRESS NOTES
[facility] telephone follow up- Capecitabine    Martha is doing well today. She has no new issues or complaints with her capecitabine. Medication administration and adherence seem appropriate; she reports no missed doses this past month. Martha denies any recent medication or supplement changes. Pharmacy will continue to follow.     Thanks,   Lubna Gupta, PharmD

## 2020-11-02 NOTE — ADDENDUM NOTE
Addended by: NAYANA CLARK on: 3/30/2018 03:34 PM     Modules accepted: Orders     Special Stains Stage 1 - Results: Base On Clearance Noted Above

## 2020-11-04 ENCOUNTER — TELEPHONE (OUTPATIENT)
Dept: INTERNAL MEDICINE | Facility: CLINIC | Age: 60
End: 2020-11-04

## 2020-11-04 DIAGNOSIS — I10 HYPERTENSION, UNSPECIFIED TYPE: Primary | ICD-10-CM

## 2020-11-04 DIAGNOSIS — R79.89 ABNORMAL CBC: ICD-10-CM

## 2020-11-04 RX ORDER — MESALAMINE 1.2 G/1
1.2 TABLET, DELAYED RELEASE ORAL 2 TIMES DAILY
Qty: 180 TABLET | Refills: 0 | Status: SHIPPED | OUTPATIENT
Start: 2020-11-04 | End: 2020-11-05 | Stop reason: SDUPTHER

## 2020-11-04 RX ORDER — LOSARTAN POTASSIUM 50 MG/1
50 TABLET ORAL DAILY
Qty: 90 TABLET | Refills: 2 | Status: SHIPPED | OUTPATIENT
Start: 2020-11-04 | End: 2020-11-05 | Stop reason: SDUPTHER

## 2020-11-04 NOTE — TELEPHONE ENCOUNTER
Caller: Martha Davey    Relationship: Self    Best call back number:[phone]   Medication needed:   Requested Prescriptions     Pending Prescriptions Disp Refills   • mesalamine (LIALDA) 1.2 g EC tablet 180 tablet 0     Sig: Take 1 tablet by mouth 2 (Two) Times a Day.   • losartan (COZAAR) 50 MG tablet 90 tablet 2     Sig: Take 1 tablet by mouth Daily.       When do you need the refill by: ASAP   What details did the patient provide when requesting the medication: ASAP   Does the patient have less than a 3 day supply:  [x] Yes  [] No    What is the patient's preferred pharmacy: [facility] - [address] [phone] [facility] [phone] FX       PATIENT WANTS TO KNOW IF SHE NEEDS TO HAVE HER LABS DONE BECAUSE SHE HAS CANCER AND IS GETTING THEM DONE ON A REGULAR BASIS WITH DR. RICHARDS.  PLEASE ADVISE

## 2020-11-04 NOTE — TELEPHONE ENCOUNTER
PATIENT WANTS TO KNOW IF SHE NEEDS TO HAVE HER LABS DONE BECAUSE SHE HAS CANCER AND IS GETTING THEM DONE ON A REGULAR BASIS WITH DR. RICHARDS.  PLEASE ADVISE    (this was in a refill request I did Wednesday.  SLW)

## 2020-11-05 ENCOUNTER — TELEPHONE (OUTPATIENT)
Dept: INTERNAL MEDICINE | Facility: CLINIC | Age: 60
End: 2020-11-05

## 2020-11-05 RX ORDER — LOSARTAN POTASSIUM 50 MG/1
50 TABLET ORAL DAILY
Qty: 90 TABLET | Refills: 2 | Status: SHIPPED | OUTPATIENT
Start: 2020-11-05 | End: 2021-05-24

## 2020-11-05 RX ORDER — MESALAMINE 1.2 G/1
1.2 TABLET, DELAYED RELEASE ORAL 2 TIMES DAILY
Qty: 180 TABLET | Refills: 0 | Status: SHIPPED | OUTPATIENT
Start: 2020-11-05 | End: 2020-12-29

## 2020-11-05 NOTE — TELEPHONE ENCOUNTER
Patient calling back to see if she needs to have her labs done on Mon 11/9. Please call back and advise asap [phone].    PATIENT WANTS TO KNOW IF SHE NEEDS TO HAVE HER LABS DONE BECAUSE SHE HAS CANCER AND IS GETTING THEM DONE ON A REGULAR BASIS WITH DR. RICHARDS.  PLEASE ADVISE

## 2020-11-09 ENCOUNTER — OFFICE VISIT (OUTPATIENT)
Dept: ONCOLOGY | Facility: CLINIC | Age: 60
End: 2020-11-09

## 2020-11-09 ENCOUNTER — LAB (OUTPATIENT)
Dept: LAB | Facility: HOSPITAL | Age: 60
End: 2020-11-09

## 2020-11-09 ENCOUNTER — INFUSION (OUTPATIENT)
Dept: ONCOLOGY | Facility: HOSPITAL | Age: 60
End: 2020-11-09

## 2020-11-09 VITALS
HEIGHT: 62 IN | TEMPERATURE: 97.7 F | SYSTOLIC BLOOD PRESSURE: 154 MMHG | OXYGEN SATURATION: 98 % | DIASTOLIC BLOOD PRESSURE: 90 MMHG | BODY MASS INDEX: 34.3 KG/M2 | HEART RATE: 94 BPM | RESPIRATION RATE: 16 BRPM | WEIGHT: 186.4 LBS

## 2020-11-09 DIAGNOSIS — C79.51 BONE METASTASES: ICD-10-CM

## 2020-11-09 DIAGNOSIS — Z17.1 MALIGNANT NEOPLASM OF OVERLAPPING SITES OF RIGHT BREAST IN FEMALE, ESTROGEN RECEPTOR NEGATIVE (HCC): ICD-10-CM

## 2020-11-09 DIAGNOSIS — C50.811 MALIGNANT NEOPLASM OF OVERLAPPING SITES OF RIGHT BREAST IN FEMALE, ESTROGEN RECEPTOR NEGATIVE (HCC): Primary | ICD-10-CM

## 2020-11-09 DIAGNOSIS — L27.1 PALMAR PLANTAR ERYTHRODYSESTHESIA: ICD-10-CM

## 2020-11-09 DIAGNOSIS — Z17.1 MALIGNANT NEOPLASM OF OVERLAPPING SITES OF RIGHT BREAST IN FEMALE, ESTROGEN RECEPTOR NEGATIVE (HCC): Primary | ICD-10-CM

## 2020-11-09 DIAGNOSIS — C50.811 MALIGNANT NEOPLASM OF OVERLAPPING SITES OF RIGHT BREAST IN FEMALE, ESTROGEN RECEPTOR NEGATIVE (HCC): ICD-10-CM

## 2020-11-09 LAB
ALBUMIN SERPL-MCNC: 4.4 G/DL (ref 3.5–5.2)
ALBUMIN/GLOB SERPL: 2 G/DL (ref 1.1–2.4)
ALP SERPL-CCNC: 54 U/L (ref 38–116)
ALT SERPL W P-5'-P-CCNC: 68 U/L (ref 0–33)
ANION GAP SERPL CALCULATED.3IONS-SCNC: 8 MMOL/L (ref 5–15)
AST SERPL-CCNC: 73 U/L (ref 0–32)
BASOPHILS # BLD AUTO: 0.04 10*3/MM3 (ref 0–0.2)
BASOPHILS NFR BLD AUTO: 1.1 % (ref 0–1.5)
BILIRUB SERPL-MCNC: 0.7 MG/DL (ref 0.2–1.2)
BUN SERPL-MCNC: 19 MG/DL (ref 6–20)
BUN/CREAT SERPL: 22.6 (ref 7.3–30)
CALCIUM SPEC-SCNC: 9.6 MG/DL (ref 8.5–10.2)
CHLORIDE SERPL-SCNC: 104 MMOL/L (ref 98–107)
CO2 SERPL-SCNC: 29 MMOL/L (ref 22–29)
CREAT SERPL-MCNC: 0.84 MG/DL (ref 0.6–1.1)
DEPRECATED RDW RBC AUTO: 57.8 FL (ref 37–54)
EOSINOPHIL # BLD AUTO: 0.13 10*3/MM3 (ref 0–0.4)
EOSINOPHIL NFR BLD AUTO: 3.5 % (ref 0.3–6.2)
ERYTHROCYTE [DISTWIDTH] IN BLOOD BY AUTOMATED COUNT: 15.9 % (ref 12.3–15.4)
GFR SERPL CREATININE-BSD FRML MDRD: 69 ML/MIN/1.73
GLOBULIN UR ELPH-MCNC: 2.2 GM/DL (ref 1.8–3.5)
GLUCOSE SERPL-MCNC: 104 MG/DL (ref 74–124)
HCT VFR BLD AUTO: 40.8 % (ref 34–46.6)
HGB BLD-MCNC: 13.2 G/DL (ref 12–15.9)
IMM GRANULOCYTES # BLD AUTO: 0.01 10*3/MM3 (ref 0–0.05)
IMM GRANULOCYTES NFR BLD AUTO: 0.3 % (ref 0–0.5)
LYMPHOCYTES # BLD AUTO: 0.92 10*3/MM3 (ref 0.7–3.1)
LYMPHOCYTES NFR BLD AUTO: 24.7 % (ref 19.6–45.3)
MAGNESIUM SERPL-MCNC: 2.3 MG/DL (ref 1.8–2.5)
MCH RBC QN AUTO: 32.3 PG (ref 26.6–33)
MCHC RBC AUTO-ENTMCNC: 32.4 G/DL (ref 31.5–35.7)
MCV RBC AUTO: 99.8 FL (ref 79–97)
MONOCYTES # BLD AUTO: 0.42 10*3/MM3 (ref 0.1–0.9)
MONOCYTES NFR BLD AUTO: 11.3 % (ref 5–12)
NEUTROPHILS NFR BLD AUTO: 2.21 10*3/MM3 (ref 1.7–7)
NEUTROPHILS NFR BLD AUTO: 59.1 % (ref 42.7–76)
NRBC BLD AUTO-RTO: 0 /100 WBC (ref 0–0.2)
PHOSPHATE SERPL-MCNC: 3.1 MG/DL (ref 2.5–4.5)
PLATELET # BLD AUTO: 218 10*3/MM3 (ref 140–450)
PMV BLD AUTO: 10.4 FL (ref 6–12)
POTASSIUM SERPL-SCNC: 4.6 MMOL/L (ref 3.5–4.7)
PROT SERPL-MCNC: 6.6 G/DL (ref 6.3–8)
RBC # BLD AUTO: 4.09 10*6/MM3 (ref 3.77–5.28)
SODIUM SERPL-SCNC: 141 MMOL/L (ref 134–145)
WBC # BLD AUTO: 3.73 10*3/MM3 (ref 3.4–10.8)

## 2020-11-09 PROCEDURE — 25010000002 DENOSUMAB 120 MG/1.7ML SOLUTION: Performed by: INTERNAL MEDICINE

## 2020-11-09 PROCEDURE — 36415 COLL VENOUS BLD VENIPUNCTURE: CPT

## 2020-11-09 PROCEDURE — 85025 COMPLETE CBC W/AUTO DIFF WBC: CPT

## 2020-11-09 PROCEDURE — 99214 OFFICE O/P EST MOD 30 MIN: CPT | Performed by: NURSE PRACTITIONER

## 2020-11-09 PROCEDURE — 80053 COMPREHEN METABOLIC PANEL: CPT

## 2020-11-09 PROCEDURE — 83735 ASSAY OF MAGNESIUM: CPT

## 2020-11-09 PROCEDURE — 96372 THER/PROPH/DIAG INJ SC/IM: CPT

## 2020-11-09 PROCEDURE — 84100 ASSAY OF PHOSPHORUS: CPT

## 2020-11-09 RX ADMIN — DENOSUMAB 120 MG: 120 INJECTION SUBCUTANEOUS at 12:16

## 2020-11-09 NOTE — PROGRESS NOTES
Subjective .     REASONS FOR FOLLOWUP:    1. Stage Ib ([id number]) right breast cancer: Diagnosed May 2010 with excisional biopsy for invasive ductal carcinoma, 1.3 cm, grade 2, ER 90%, FL 80%, HER-2/peter negative (1+ IHC).  Subsequent right mastectomy in July 2010 with no residual breast malignancy, 1/5 sentinel lymph node with micrometastasis (0.25 mm).  Treated in the [facility] system with adjuvant AC ×4 cycles in 2010 (no taxanes administered due to underlying Charcot-Saloni-Tooth with peripheral neuropathy).  Adjuvant Femara (postmenopausal) initiated October 2010 with plan to treat ×10 years.  Genetic testing reportedly negative.  Developed osteopenia treated with Prolia beginning 2/27/13.  2. Recurrent/metastatic disease identified 10/8/17 involving thoracic spine with cord compression at T6, lumbosacral involvement, sternal and right sternoclavicular involvement.  Femara discontinued.  Radiation administered (in the [facility] system) to the thoracic spine beginning 10/19/17 treating T3-T9 to a dose of 24 gray in 6 fractions.  3. Evaluation with MRI 12/8/17 showing persistent T6 cord compression with persistent neurologic compromise requiring surgical treatment 12/11/17 with T6 laminectomy/corpectomy and T3-T9 fusion.  Pathology with metastatic carcinoma of breast origin, ER negative, FL negative, HER-2/peter negative (1+ IHC).  4. Right pulmonary embolism 10/21/17 treated with Lovenox 1 mg/kg (100 mg) every 12 hours.  No evidence of DVT.  Lovenox held due to right gluteus hematoma 3/23/18 through 4/6/18.  Transitioned to oral Eliquis 5mg bid 1/28/19 (as of 2/25/19, patient still using previous supply of Lovenox).  5. Cancer related pain previously on Duragesic 50 µg patch every 72 hours and Dilaudid 4 mg as needed for breakthrough pain.  Narcotics subsequently discontinued with improvement in pain in January 2018.  6. Radiation therapy to L3 dural metastasis and left humerus initiated 1/15/18 (10 fractions),  completed 1/26/18  7. Monthly Xgeva initiated 1/23/18  8. Mild hypercalcemia of malignancy  9. Initiation of palliative oral single agent Xeloda 2/7/18 (2000 mg a.m., 1500 mg p.m. for 14/21 days).  10. 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  In reviewing prior CT scans, has been present for some time.  Monitor for now, if it enlarges consider surgical excision.  11. Cumulative side effects from Xeloda with fatigue, anorexia, diarrhea, increased tearing.  Alteration in dose/schedule with cycle 11 to 1500 mg twice a day for 7 days on followed by 7 days off.    HISTORY OF PRESENT ILLNESS:  The patient is a 59 y.o. year old female who is here for follow-up with the above-mentioned history.  The patient continues on Xeloda    History of Present Illness patient is a 59-year-old female with the above-mentioned history who is here today for lab review and evaluation continuing on oral Xeloda 1500 mg twice daily 7 days on, followed by 7 days off.  She has 3 doses remaining of this week.  She is also due for her monthly Xgeva today.  The patient reports the redness in her and swelling in her hands has gotten slightly worse.  She states that she has some itching on her hands.  She has 1 tiny area of blistering on her left great toe, otherwise no blistering.  Some mild erythema of her feet bilaterally.  She denies issues with mouth sores.    The patient denies issues with diarrhea or constipation.    She is intermittently tearful today reporting greater stress in her personal life.  We discussed referral to the supportive oncology services clinic, and she is willing to proceed with this.      Past Medical History:   Diagnosis Date   • Acute pulmonary embolism, cancer-related 10/2017   • Allergic rhinitis    • Arthritis     Right knee; left shoulder   •  Cancer (CMS/[facility]) 2010    Right breast   • Cancer (CMS/[facility]) 10/2017    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   • Charcot-Saloni-Tooth disease    • CPAP (continuous positive airway pressure) dependence    • GERD (gastroesophageal reflux disease)    • H/O Colon polyps    • H/O Uterine fibroid    • Heart murmur    • Hyperlipidemia    • Hypertension    • PONV (postoperative nausea and vomiting)      Past Surgical History:   Procedure Laterality Date   • BLADDER SURGERY      Bladder lift   • BREAST RECONSTRUCTION, BREAST TISSUE EXPANDER INSERTION Right    • BREAST SURGERY Right 2010    Mastectomy   •  SECTION  ,    • CHOLECYSTECTOMY     • COLONOSCOPY     • HERNIA REPAIR  2015    [facility]   • HYSTERECTOMY      Partial   • KNEE SURGERY Right    • LEG SURGERY Left     Broken   • MASTECTOMY Right    • NY TREAT TIBIAL SHAFT FX, INTRAMED IMPLANT Left 2017    Procedure: TIBIA INTRAMEDULLARY NAIL/DON INSERTION;  Surgeon: Romero Shanks MD;  Location: [facility];  Service: Orthopedics   • THORACIC DECOMPRESSION POSTERIOR FUSION WITH INSTRUMENTATION N/A 2017    Procedure: T6 costotransversectomy and decompression with T3 to  T9 posterior spinal fusion [facility];  Surgeon: Quan Nayak MD;  Location: [facility];  Service:        ONCOLOGIC HISTORY:  (History from previous dates can be found in the separate document.)    Current Outpatient Medications on File Prior to Visit   Medication Sig Dispense Refill   • acetaminophen (TYLENOL) 500 MG tablet Take 500 mg by mouth Every 6 (Six) Hours As Needed for Mild Pain .     • calcium carbonate (OS-CARY) 600 MG tablet Take 600 mg by mouth 2 (Two) Times a Day With Meals.     • capecitabine (XELODA) 500 MG chemo tablet Take 3 tabs (1500 mg) by mouth twice a day for 7 days on then 7 days off. 84 tablet 3   • cholecalciferol (VITAMIN D3) 1000 units tablet Take 1,000 Units by mouth Daily.      • diazePAM (VALIUM) 10 MG tablet Take 1 tablet by mouth Every 12 (Twelve) Hours As Needed for Anxiety. 10 tablet 1   • diphenoxylate-atropine (LOMOTIL) 2.5-0.025 MG per tablet Take 1 tablet by mouth 4 (Four) Times a Day As Needed for Diarrhea. 60 tablet 0   • ELIQUIS 5 MG tablet tablet TAKE 1 TABLET BY MOUTH EVERY 12 HOURS 60 tablet 6   • FLONASE ALLERGY RELIEF 50 MCG/ACT nasal spray 2 sprays into each nostril daily.  11   • gabapentin (NEURONTIN) 300 MG capsule TAKE 1 CAPSULE BY MOUTH  TWICE DAILY 180 capsule 3   • ketoconazole (NIZORAL) 2 % cream Apply  topically to the appropriate area as directed Daily. 15 g 0   • LORazepam (Ativan) 1 MG tablet Take 1 tablet by mouth Take As Directed for 2 doses. Take one tablet 30 minutes before scheduled test, may repeat x1 if needed 2 tablet 0   • losartan (COZAAR) 50 MG tablet Take 1 tablet by mouth Daily. 90 tablet 2   • mesalamine (LIALDA) 1.2 g EC tablet Take 1 tablet by mouth 2 (Two) Times a Day. 180 tablet 0   • metaxalone (SKELAXIN) 800 MG tablet Take 1 tablet by mouth 3 (Three) Times a Day As Needed for Muscle Spasms. 30 tablet 3   • mupirocin (BACTROBAN) 2 % ointment JUANITA TO SURGICAL SITE AND COVER WITH BAND AID D UNTIL HEALED     • omeprazole (PriLOSEC) 40 MG capsule TAKE 1 CAPSULE DAILY 90 capsule 2   • ondansetron ODT (ZOFRAN-ODT) 8 MG disintegrating tablet Take 1 tablet by mouth Every 8 (Eight) Hours As Needed for Nausea or Vomiting. 30 tablet 1   • prochlorperazine (COMPAZINE) 10 MG tablet Take 1 tablet by mouth Every 6 (Six) Hours As Needed for Nausea or Vomiting. 30 tablet 0   • sucralfate (CARAFATE) 1 g tablet Take 1 tablet by mouth 4 (Four) Times a Day. 120 tablet 2   • temazepam (RESTORIL) 15 MG capsule Take 1 capsule by mouth At Night As Needed for Sleep. 90 capsule 1   • traMADol (ULTRAM) 50 MG tablet Take 1 tablet by mouth Every 8 (Eight) Hours As Needed for Moderate Pain . 90 tablet 1   • trimethoprim (TRIMPEX) 100 MG tablet Take 100 mg by mouth Daily.      • XIIDRA 5 % ophthalmic solution INSTILL 1 DROP INTO EACH EYE BID.     • phenazopyridine (PYRIDIUM) 200 MG tablet Take 200 mg by mouth 3 (Three) Times a Day As Needed for bladder spasms.     • sennosides-docusate sodium (SENOKOT-S) 8.6-50 MG tablet Take 2 tablets by mouth 2 (Two) Times a Day. 90 tablet 2   • triamcinolone (KENALOG) 0.1 % cream Apply  topically to the appropriate area as directed 2 (Two) Times a Day. 15 g 0     No current facility-administered medications on file prior to visit.        ALLERGIES:     Allergies   Allergen Reactions   • Hydrocodone Nausea Only   • Morphine And Related Nausea And Vomiting     Social History     Socioeconomic History   • Marital status:      Spouse name: Murray   • Number of children: 3   • Years of education: College   • Highest education level: Not on file   Occupational History     Employer: [employer]     Employer: [employer]   Tobacco Use   • Smoking status: Never Smoker   • Smokeless tobacco: Never Used   Substance and Sexual Activity   • Alcohol use: Yes     Comment: social   • Drug use: No   • Sexual activity: Defer     Family History   Problem Relation Age of Onset   • Heart disease Mother    • Hyperlipidemia Mother    • Hypertension Mother    • Diabetes Father    • Charcot-Saloni-Tooth disease Father    • Heart disease Father    • Hypertension Father    • Heart failure Father    • Diabetes Sister    • Heart disease Sister    • Hypertension Sister    • Heart disease Maternal Aunt    • Scoliosis Maternal Aunt    • Heart disease Maternal Uncle    • Diabetes Maternal Grandmother    • Heart disease Maternal Grandmother    • Hypertension Maternal Grandmother    • Uterine cancer Maternal Grandmother    • Heart disease Maternal Grandfather      Review of Systems   Constitutional: Positive for fatigue and unexpected weight change. Negative for activity change and fever.   HENT: Negative for congestion, mouth sores, nosebleeds, sore throat and voice change.     Eyes: Negative for discharge, itching and visual disturbance.   Respiratory: Negative for cough, shortness of breath and wheezing.    Cardiovascular: Negative for chest pain, palpitations and leg swelling.   Gastrointestinal: Negative for abdominal distention, abdominal pain, blood in stool, constipation, diarrhea, nausea and vomiting.   Endocrine: Negative for cold intolerance and heat intolerance.   Genitourinary: Negative for difficulty urinating, dysuria, flank pain, frequency, hematuria and urgency.   Musculoskeletal: Negative for arthralgias, back pain, joint swelling and myalgias.   Skin: Positive for color change and rash. Negative for wound.   Neurological: Negative for dizziness, syncope, weakness, light-headedness, numbness and headaches.   Hematological: Negative for adenopathy. Does not bruise/bleed easily.   Psychiatric/Behavioral: Positive for sleep disturbance. Negative for confusion. The patient is nervous/anxious.    11/9/2020 ROS unchanged from above except as updated.      Objective      Vitals:    11/09/20 1110   BP: 154/90   Pulse: 94   Resp: 16   Temp: 97.7 °F (36.5 °C)   SpO2: 98%      Current Status 11/9/2020   ECOG score 0   Pain 0/10    Physical Exam   Constitutional: She is oriented to person, place, and time. She appears well-developed.   Eyes: Conjunctivae are normal.   Neck: No thyromegaly present.   Cardiovascular: Normal rate and regular rhythm. Exam reveals no gallop and no friction rub.   No murmur heard.  Pulmonary/Chest: Breath sounds normal. No respiratory distress.   Abdominal: Soft. Bowel sounds are normal. She exhibits no distension. There is no abdominal tenderness.   Musculoskeletal:      Comments: Lower extremity braces in place.  No significant lower extremity edema   Neurological: She is alert and oriented to person, place, and time. She displays normal reflexes. No cranial nerve deficit.   Skin: Skin is warm and dry. Rash noted.   Erythema of the palms of the hands  bilaterally as well as erythema of the dorsal surface of the hands.  Mild dryness of the palms and slight cracking.  1 tiny area of blistering on her left great toe.  Bilateral plantar erythema.     Psychiatric: Her behavior is normal.   11/9/2020 physical exam is unchanged from above except as updated.      RECENT LABS:  Hematology WBC   Date Value Ref Range Status   11/09/2020 3.73 3.40 - 10.80 10*3/mm3 Final   11/04/2019 4.09 3.40 - 10.80 10*3/mm3 Final     RBC   Date Value Ref Range Status   11/09/2020 4.09 3.77 - 5.28 10*6/mm3 Final   11/04/2019 4.26 3.77 - 5.28 10*6/mm3 Final     Hemoglobin   Date Value Ref Range Status   11/09/2020 13.2 12.0 - 15.9 g/dL Final     Hematocrit   Date Value Ref Range Status   11/09/2020 40.8 34.0 - 46.6 % Final     Platelets   Date Value Ref Range Status   11/09/2020 218 140 - 450 10*3/mm3 Final        Lab Results   Component Value Date    GLUCOSE 104 11/09/2020    BUN 19 11/09/2020    CREATININE 0.84 11/09/2020    EGFRIFNONA 69 11/09/2020    EGFRIFAFRI 66 11/04/2019    BCR 22.6 11/09/2020    K 4.6 11/09/2020    CO2 29.0 11/09/2020    CALCIUM 9.6 11/09/2020    PROTENTOTREF 6.3 11/04/2019    ALBUMIN 4.40 11/09/2020    LABIL2 2.3 11/04/2019    AST 73 (H) 11/09/2020    ALT 68 (H) 11/09/2020     Reviewed CT chest abdomen pelvis and bone scan as outlined above and below.  I did personally review CT images.  I discussed the bone scan findings on the telephone with Dr. Ortega in radiology.    Assessment/Plan      1. Previous Stage Ib ([id number]) right breast cancer:  · Diagnosed May 2010 with excisional biopsy for invasive ductal carcinoma, 1.3 cm, grade 2, ER 90%, ME 80%, HER-2/peter negative (1+ IHC).    · Subsequent right mastectomy in July 2010 with no residual breast malignancy, 1/5 sentinel lymph node with micrometastasis (0.25 mm).    · Treated in the [facility] system with adjuvant AC ×4 cycles in 2010 (no taxanes administered due to underlying Charcot-Saloni-Tooth with peripheral  neuropathy).    · Adjuvant Femara (postmenopausal) initiated October 2010 with plan to treat ×10 years.    · Genetic testing reportedly negative.    · Developed osteopenia treated with Prolia beginning 2/27/13. Subsequently discontinued due to identification of metastatic disease.  2. Recurrent/metastatic disease identified 10/8/17:  · Disease involving thoracic spine with cord compression at T6, lumbosacral involvement, sternal and right sternoclavicular involvement.    · Femara discontinued in 10/2017.    · Radiation administered (in the [facility] system) to the thoracic spine beginning 10/19/17 treating T3-T9 to a dose of 24 gray in 6 fractions.  · Evaluation with MRI 12/8/17 showing persistent T6 cord compression with persistent neurologic compromise requiring surgical treatment 12/11/17 with T6 laminectomy/corpectomy and T3-T9 fusion.  Pathology with metastatic carcinoma of breast origin, ER negative, NC negative, HER-2/peter negative (1+ IHC).  · Additional staging evaluation 12/8/17 with no evidence of visceral metastatic disease, bone scan showing involvement of thoracic spine, sternum, left humerus, mid frontal bone.  No plane film correlate of left humerus lesion.  MRI lumbar spine with small intradural L3 metastasis.  CA 15-3 12/6/17- 17.  · Palliative radiation therapy to L3 dural metastasis and left humerus initiated 1/15/18 (10 fractions), completed 1/26/18.  · Hypercalcemia of malignancy with calcium in the 10-11 range.  · Initiation of monthly Xgeva 1/23/18.  · Baseline CT scan 1/30/18 with no evidence of visceral involvement.  Cluster of nodular opacities in the right lower lobe suspected to be infectious or related to bronchiolitis. Bone scan 1/30/18 showed postsurgical change in the thoracic spine, stable uptake in the frontal bone, no new areas of disease.  · Initiation of palliative oral single agent Xeloda 2/7/18 2000 mg a.m., 1500 mg p.m. for 14/21 days.   · Following 3 cycles xeloda, bone scan  4/4/18 showed no change from the prior study.  CT scan 4/4/18 showed a small pericardial effusion of unclear significance as well as a subcutaneous nodule in the right lateral chest wall.  Subsequent evaluation with echocardiogram 4/17/18 showed no evidence of pericardial effusion.  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  We discussed the possibility of surgical excision of the right subcutaneous chest wall lesion for more definitive diagnosis.  Reviewed previous CT images dating back to 12/8/17 and the lesion was present even at that time measuring around 1.7 cm although not commented on in the radiology report.  As this appears to be an indolent low-grade process unrelated to her breast cancer, recommendee foregoing surgical excision at this time and monitoring this area on future scans.  The patient agreed.    · Following 6 cycles of Xeloda, CT 6/6/18  showed stable findings, no evidence of progressive disease.  There was a comment regarding subcutaneous abnormality in the anterior abdominal wall and this was related to Lovenox injection sites.  Bone scan 6/6/18 showed no interval change.   · CT scan and bone scan 8/13/18 following 9 cycles of Xeloda showed stable findings with no evidence of significant visceral metastases.  Her bone lesions appear stable on bone scan.  The spindle cell neoplasm in her right chest wall actually decreased in size from 2 cm down to 1.6 cm.    · The patient experienced some symptoms of diarrhea, anorexia, generalized weakness during cycle 9 Xeloda it was unclear whether this was related to a viral gastroenteritis or toxicity from treatment.  Symptoms recurred during cycle 10 and treatment was cut short by 2 days.  Symptoms attributed to Xeloda.  With cycle 11, dose and schedule altered to 1500 mg twice daily for 7 days on followed by 7  days off .  · Most recent 3-month interval scans with CT scan 9/9/2020 with no significant changes.  Bone scan 9/9/2020 read as unchanged from prior studies however did note an area of slight activity in the medial left femur.  Contacted radiology and although this was not noted on prior reports appears to have been present.  Unclear etiology, recommended MRI for further evaluation.  · MRI left femur 9/21/2020 mild cortical thickening and periosteal edema and enhancement at the left iliac is muscle insertion to the medial proximal left femur without any evidence of metastatic disease identified around the proximal femurs or the visualized pelvis.  Felt to be related to tendinitis.   · Returns 10/12/2020 due for Xgeva, continuing to tolerate Xeloda well with just mild hand/ foot symptoms.    · Returns 11/9/2020 due for Xgeva continuing on Xeloda.  Hand-foot syndrome has slightly worsened.  We discussed continuing emollient creams frequently as well as use of hydrocortisone cream and sleeping with gloves on.  The patient will continue aggressive skin care.  3. Right pulmonary embolism:  · Diagnosed on CT angiogram 10/21/17 involving small right lower lobe pulmonary artery.  Lower extremity Dopplers negative.  · Bilateral lower extremity Dopplers negative again 12/5/17.  · Received chronic Lovenox 1 mg/kg twice per day, transition to oral Eliquis in February 2019, continuing on Eliquis 5 mg twice daily.  4. Cancer related pain:  · Previously receiving Duragesic 50 µg patch every 72 hours along with Dilaudid 4 mg as needed for breakthrough pain  · The patient's pain improved over time and she was able to discontinue both Duragesic and Dilaudid in the interval.  · Patient does take occasional Flexeril at bedtime due to back spasm/pain when she has been more active.  · The patient does have some occasional aggravation of her chronic back pain.    · No narcotic requirements recently.  5. Chemotherapy-induced diarrhea with  subsequent C. difficile colitis in the setting of previous ulcerative colitis:  · Patient with reported history of ulcerative colitis, is not on active therapy.  · The patient developed initial diarrhea related to Xeloda at regular dosing.  · Symptoms improved on reduced dose Xeloda  · Flare of symptoms in October 2018 with apparent finding of C. difficile colitis by GI, treated with course of oral vancomycin with improvement in symptoms.  · Patient notes minimal intermittent diarrhea on Xeloda requiring occasional dosing of Imodium.    6. Traumatic left tibia/fibular fracture:  · Status post ORIF 12/6/17  · Specimen was sent for pathologic review, negative for evidence of malignancy  7. Hypercalcemia:  · Suspect hypercalcemia of malignancy, calcium in  10-11 range previously.  · Calcium normalized following initiation of monthly Xgeva on 1/23/18.  · 11/9/2020 calcium level is 9.6.  We will continue to monitor.  8. Chemotherapy-induced mucositis:  · Patient had a minimal degree of mucositis with cycle 2.  The patient has magic mouthwash to use as needed.  No subsequent mucositis.  9. Recurrent UTI, bladder wall thickening on CT:  · Patient had an enterococcal UTI on 3/2/18 sensitive to nitrofurantoin and received treatment, unclear how long.  · Recurrent UTI 3/20/18 with urine culture growing Klebsiella, initially treated with nitrofurantoin, transitioned to Levaquin.  · CT 4/4/18 with diffuse bladder wall thickening with increased nodular thickening at the left base.  Referral to urogynecology Dr. May Johnson.  She was placed on a prophylactic dose of trimethoprim 100 mg daily, bladder wall thickening felt to be related to recent recurrent urinary tract infections.  · Patient with urinary symptoms, treated with course of Macrobid at the end of December 2018, urine culture however was negative 12/31/18.  · Patient was found to have Klebsiella UTI 7/29/2019 which was successfully treated with Macrobid with complete  resolution of symptoms.  10.   Mobility:  · The patient underwent an intensive course of rehabilitation at [facility].  She graduated from her outpatient course November 2018.  · Overall the patient has improved dramatically in terms of mobility, now walking around 1 mile per day without assistance.  11.  Depression:  · The patient weaned herself off of Cymbalta and reports that her symptoms remain stable.  · She returns 11/9/2020 intermittently tearful reporting increased stress and difficulty with her personal life.  We discussed referral to supportive oncology services clinic, and the patient is willing to proceed with this.  12. 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  Symptoms did improve and she continued on the same dose.    · Symptoms are currently stable.  13. Evidence of steatosis on scans with mild intermittent elevated liver function studies:  · Liver function studies increased 8/20/19 with ALT 98, AST 70, normal total bilirubin.  · Negative viral hepatitis A, B, and C panel 8/23/18  · Likely related to hepatic steatosis.   · Subsequent improvement in LFTs  · Today, 11/9/2020 AST increased to 73, ALT 68, total bilirubin 0.7.  We will continue to closely monitor.  14. Chemotherapy induced leukopenia:  · WBC today 3.73 with normal differential  15. GERD:  · The patient continues on omeprazole 40 mg daily (2 x 20 mg).  · Today the patient reports that her symptoms have been significant recently.  She notes that the symptoms are worse when lying down and do cause a cough.  We previously prescribed Carafate however she never obtained the medication.  I did recommend this again and sent in a new prescription for Carafate 1 g 4 times daily.  16. Insomnia:  · Patient with prior paradoxical reaction to Benadryl  · Improved previously  on temazepam 15 mg nightly as needed.   · Patient noted subsequently that temazepam was having no effect.  She did increase gabapentin dosing to 600 mg nightly and this has helped.   17. Health maintenance:  · Patient notes that she has a history of colon polyps as well as ulcerative colitis and was due for a follow-up colonoscopy on 9/12/2019.  We did discuss there is no necessity to pursue colonoscopy in the setting of her metastatic breast cancer.    · The patient did undergo colonoscopy on 2/7/2020 with findings of muscular hypertrophy and diverticulosis.   18. Right shoulder pain:  · Patient was evaluated by Dr Forrester.  She has experienced difficulty over the past year and a half with her right shoulder although she has not complained of this in prior visits to our office.  She reports difficulty with abduction.    · The patient did undergo MRI of her right shoulder on 11/21/2019 at an outside facility showing multiple abnormalities including supraspinatus tendinosis, labral tear but no evidence of metastatic disease.  · Symptoms currently improved/resolved.  19. Ocular changes in part related to Xeloda:  · Patient experienced a mild degree of blurred vision as well as burning and pruritus.  · She was seen by her ophthalmologist and has been continuing on xiidra ophthalmic drops which have helped.  · Likely both issues are to some extent related to Xeloda.  · Symptoms currently stable to improved.    Plan:  1. Continue oral Xeloda 1500 mg twice a day 7 days on, followed by 7 days off.  She will complete this cycle tomorrow.  2. Monthly Xgeva today.  3. Continue Eliquis 5 mg twice daily.  4. Continue aggressive skin care with emollient cream to the hands and feet at least twice daily.  We discussed using hydrocortisone cream if needed to help with itching as well as continuing to wear cotton socks and gloves at night when she sleeps.  5. Referral to supportive oncology services clinic to help with  anxiety/depression.  6. Continue omeprazole 40 mg daily and Carafate 1 g 4 times daily.  7. Patient will have CT scan of the chest, abdomen, and pelvis as well as a bone scan in about 3 weeks.    8. Return for follow-up with Dr. Hancock in 4 weeks to review scan results with repeat labs, due for her next dose of Xgeva.        Glory Osborne, APRN  11/9/2020

## 2020-11-10 ENCOUNTER — MEDICATION THERAPY MANAGEMENT (OUTPATIENT)
Dept: PHARMACY | Facility: HOSPITAL | Age: 60
End: 2020-11-10

## 2020-11-10 DIAGNOSIS — Z17.1 MALIGNANT NEOPLASM OF OVERLAPPING SITES OF RIGHT BREAST IN FEMALE, ESTROGEN RECEPTOR NEGATIVE (HCC): Primary | ICD-10-CM

## 2020-11-10 DIAGNOSIS — C50.811 MALIGNANT NEOPLASM OF OVERLAPPING SITES OF RIGHT BREAST IN FEMALE, ESTROGEN RECEPTOR NEGATIVE (HCC): Primary | ICD-10-CM

## 2020-11-10 DIAGNOSIS — F32.A ANXIETY AND DEPRESSION: ICD-10-CM

## 2020-11-10 DIAGNOSIS — F41.9 ANXIETY AND DEPRESSION: ICD-10-CM

## 2020-11-10 NOTE — PROGRESS NOTES
[facility] Lab Review- Capecitabine      11/9/2020   WBC 3.40 - 10.80 10*3/mm3 3.73   Neutrophils Absolute 1.70 - 7.00 10*3/mm3 2.21   Hemoglobin 12.0 - 15.9 g/dL 13.2   Hematocrit 34.0 - 46.6 % 40.8   Platelets 140 - 450 10*3/mm3 218   Creatinine 0.60 - 1.10 mg/dL 0.84   eGFR Non African Am >60 mL/min/1.73 69   BUN 6 - 20 mg/dL 19   Sodium 134 - 145 mmol/L 141   Potassium 3.5 - 4.7 mmol/L 4.6   Glucose 74 - 124 mg/dL 104   Magnesium 1.8 - 2.5 mg/dL 2.3   Calcium 8.5 - 10.2 mg/dL 9.6   Albumin 3.50 - 5.20 g/dL 4.40   Total Protein 6.3 - 8.0 g/dL 6.6   AST (SGOT) 0 - 32 U/L 73 (A)   ALT (SGPT) 0 - 33 U/L 68 (A)   Alkaline Phosphatase 38 - 116 U/L 54   Total Bilirubin 0.2 - 1.2 mg/dL 0.7   Phosphorus 2.5 - 4.5 mg/dL 3.1     APRN dictation noted. Martha will continue current treatment at this time.     Thanks,   Lubna Gupta, DimitriosD

## 2020-11-13 ENCOUNTER — OFFICE VISIT (OUTPATIENT)
Dept: INTERNAL MEDICINE | Facility: CLINIC | Age: 60
End: 2020-11-13

## 2020-11-13 VITALS
HEIGHT: 62 IN | HEART RATE: 76 BPM | BODY MASS INDEX: 34.48 KG/M2 | DIASTOLIC BLOOD PRESSURE: 76 MMHG | WEIGHT: 187.4 LBS | SYSTOLIC BLOOD PRESSURE: 128 MMHG | OXYGEN SATURATION: 98 %

## 2020-11-13 DIAGNOSIS — R79.89 ABNORMAL LFTS: ICD-10-CM

## 2020-11-13 DIAGNOSIS — C50.919 MALIGNANT NEOPLASM OF FEMALE BREAST, UNSPECIFIED ESTROGEN RECEPTOR STATUS, UNSPECIFIED LATERALITY, UNSPECIFIED SITE OF BREAST (HCC): Primary | ICD-10-CM

## 2020-11-13 DIAGNOSIS — L27.1 HAND FOOT SYNDROME: ICD-10-CM

## 2020-11-13 PROCEDURE — 99214 OFFICE O/P EST MOD 30 MIN: CPT | Performed by: INTERNAL MEDICINE

## 2020-11-13 NOTE — PROGRESS NOTES
"Chief Complaint   Patient presents with   • Hypertension   • Breast Cancer     side effects of medication   • Anxiety       History of Present Illness   Martha Davey is a 59 y.o. female presents for follow up evaluation. She has mets breast ca and reports feeling fairly stable at this time. She is noted to have a fair amount of dryness and cracking in her hands and feet. She feels that her hands are very \"tight\". Left foot with cracked wound. To wound care center but not advised additional care on that.   Some anxiety. 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 She is scheduled to meet w/ her therapist. She reports using paxil in the past w/ cancer diagnosis.   Has hypertension but bp has been normotensive.   Elevated AST/ ALT. She has about 2 etoh jeffrey weekly. otc med only for congestion. She is on xeloda for her breast cancer.       The following portions of the patient's history were reviewed and updated as appropriate: allergies, current medications, past family history, past medical history, past social history, past surgical history and problem list.  Current Outpatient Medications on File Prior to Visit   Medication Sig Dispense Refill   • acetaminophen (TYLENOL) 500 MG tablet Take 500 mg by mouth Every 6 (Six) Hours As Needed for Mild Pain .     • calcium carbonate (OS-CARY) 600 MG tablet Take 600 mg by mouth 2 (Two) Times a Day With Meals.     • capecitabine (XELODA) 500 MG chemo tablet Take 3 tabs (1500 mg) by mouth twice a day for 7 days on then 7 days off. 84 tablet 3   • cholecalciferol (VITAMIN D3) 1000 units tablet Take 1,000 Units by mouth Daily.     • diazePAM (VALIUM) 10 MG tablet Take 1 tablet by mouth Every 12 (Twelve) Hours As Needed for Anxiety. 10 tablet 1   • diphenoxylate-atropine (LOMOTIL) 2.5-0.025 MG per tablet Take 1 tablet by mouth 4 (Four) Times a Day As Needed for Diarrhea. 60 tablet " 0   • ELIQUIS 5 MG tablet tablet TAKE 1 TABLET BY MOUTH EVERY 12 HOURS 60 tablet 6   • FLONASE ALLERGY RELIEF 50 MCG/ACT nasal spray 2 sprays into each nostril daily.  11   • gabapentin (NEURONTIN) 300 MG capsule TAKE 1 CAPSULE BY MOUTH  TWICE DAILY 180 capsule 3   • ketoconazole (NIZORAL) 2 % cream Apply  topically to the appropriate area as directed Daily. 15 g 0   • LORazepam (Ativan) 1 MG tablet Take 1 tablet by mouth Take As Directed for 2 doses. Take one tablet 30 minutes before scheduled test, may repeat x1 if needed 2 tablet 0   • losartan (COZAAR) 50 MG tablet Take 1 tablet by mouth Daily. 90 tablet 2   • mesalamine (LIALDA) 1.2 g EC tablet Take 1 tablet by mouth 2 (Two) Times a Day. 180 tablet 0   • metaxalone (SKELAXIN) 800 MG tablet Take 1 tablet by mouth 3 (Three) Times a Day As Needed for Muscle Spasms. 30 tablet 3   • mupirocin (BACTROBAN) 2 % ointment JUANITA TO SURGICAL SITE AND COVER WITH BAND AID D UNTIL HEALED     • omeprazole (PriLOSEC) 40 MG capsule TAKE 1 CAPSULE DAILY 90 capsule 2   • ondansetron ODT (ZOFRAN-ODT) 8 MG disintegrating tablet Take 1 tablet by mouth Every 8 (Eight) Hours As Needed for Nausea or Vomiting. 30 tablet 1   • phenazopyridine (PYRIDIUM) 200 MG tablet Take 200 mg by mouth 3 (Three) Times a Day As Needed for bladder spasms.     • prochlorperazine (COMPAZINE) 10 MG tablet Take 1 tablet by mouth Every 6 (Six) Hours As Needed for Nausea or Vomiting. 30 tablet 0   • sennosides-docusate sodium (SENOKOT-S) 8.6-50 MG tablet Take 2 tablets by mouth 2 (Two) Times a Day. 90 tablet 2   • sucralfate (CARAFATE) 1 g tablet Take 1 tablet by mouth 4 (Four) Times a Day. 120 tablet 2   • temazepam (RESTORIL) 15 MG capsule Take 1 capsule by mouth At Night As Needed for Sleep. 90 capsule 1   • traMADol (ULTRAM) 50 MG tablet Take 1 tablet by mouth Every 8 (Eight) Hours As Needed for Moderate Pain . 90 tablet 1   • triamcinolone (KENALOG) 0.1 % cream Apply  topically to the appropriate area as  "directed 2 (Two) Times a Day. 15 g 0   • trimethoprim (TRIMPEX) 100 MG tablet Take 100 mg by mouth Daily.     • XIIDRA 5 % ophthalmic solution INSTILL 1 DROP INTO EACH EYE BID.       No current facility-administered medications on file prior to visit.      Review of Systems   Constitutional: Negative.    HENT: Negative.    Eyes: Negative.    Respiratory: Negative.    Cardiovascular: Negative.    Gastrointestinal: Negative.    Endocrine: Negative.    Genitourinary: Negative.    Musculoskeletal: Negative.    Skin:        Dry cracked skin     Allergic/Immunologic: Negative.    Neurological: Negative.    Hematological: Negative.    Psychiatric/Behavioral: Negative.        Objective   Physical Exam  Vitals signs and nursing note reviewed.   Constitutional:       Appearance: Normal appearance.   HENT:      Head: Normocephalic and atraumatic.      Right Ear: Tympanic membrane normal.      Left Ear: Tympanic membrane normal.      Nose: Nose normal.      Mouth/Throat:      Mouth: Mucous membranes are moist.   Eyes:      Extraocular Movements: Extraocular movements intact.      Pupils: Pupils are equal, round, and reactive to light.   Neck:      Musculoskeletal: Normal range of motion.   Cardiovascular:      Rate and Rhythm: Normal rate and regular rhythm.      Pulses: Normal pulses.      Heart sounds: Normal heart sounds.   Pulmonary:      Effort: Pulmonary effort is normal.      Breath sounds: Normal breath sounds.   Skin:     General: Skin is dry.      Comments: Dry cracked skin hands and feet. No signs of infection.    Neurological:      General: No focal deficit present.      Mental Status: She is alert and oriented to person, place, and time.   Psychiatric:         Mood and Affect: Mood normal.         Behavior: Behavior normal.         Thought Content: Thought content normal.         Judgment: Judgment normal.          /76   Pulse 76   Ht 157.5 cm (62\")   Wt 85 kg (187 lb 6.4 oz)   LMP  (LMP Unknown)   SpO2 " 98%   BMI 34.28 kg/m²     Assessment/Plan   Diagnoses and all orders for this visit:    Malignant neoplasm of female breast, unspecified estrogen receptor status, unspecified laterality, unspecified site of breast (CMS/HCC)    Hand foot syndrome    Abnormal LFTs  -     Hepatic Function Panel        Patient w/ breast cancer. Now w/ some side effects of medication. She will increase hydration w/ lotion to hands and feet tid. She has elevated ast and alt. Will repeat today. She will see psychologist regarding anxiety. She would like to meet w/ psychology first then possibly explore medication. She will monitor bp and conintue current medications. F/u 6 mo or prn.

## 2020-11-14 LAB
ALBUMIN SERPL-MCNC: 4.2 G/DL (ref 3.5–5.2)
ALP SERPL-CCNC: 53 U/L (ref 39–117)
ALT SERPL-CCNC: 34 U/L (ref 1–33)
AST SERPL-CCNC: 23 U/L (ref 1–32)
BILIRUB DIRECT SERPL-MCNC: 0.2 MG/DL (ref 0–0.3)
BILIRUB SERPL-MCNC: 0.5 MG/DL (ref 0–1.2)
PROT SERPL-MCNC: 6 G/DL (ref 6–8.5)

## 2020-11-16 ENCOUNTER — SPECIALTY PHARMACY (OUTPATIENT)
Dept: PHARMACY | Facility: HOSPITAL | Age: 60
End: 2020-11-16

## 2020-11-16 RX ORDER — CAPECITABINE 500 MG/1
TABLET, FILM COATED ORAL
Qty: 84 TABLET | Refills: 3 | Status: SHIPPED | OUTPATIENT
Start: 2020-11-16 | End: 2021-02-23 | Stop reason: SDUPTHER

## 2020-11-16 NOTE — TELEPHONE ENCOUNTER
I have been notified by Jean Paul, Pharmacist at [facility] that pt is out of refills on her Xeloda. Per last office note-pt is to continue 1500 mg BID 7 days on then 7 days off.    I have routed the rx to Dr Hancock for signature. Once signed it will be escribed to [facility].    Confirmation rec that Dr Hancock has signed the Xeloda rx. This was escribed to [facility].

## 2020-11-17 RX ORDER — METAXALONE 800 MG/1
800 TABLET ORAL 3 TIMES DAILY PRN
Qty: 30 TABLET | Refills: 3 | Status: SHIPPED | OUTPATIENT
Start: 2020-11-17 | End: 2021-03-24 | Stop reason: SDUPTHER

## 2020-11-20 ENCOUNTER — MEDICATION THERAPY MANAGEMENT (OUTPATIENT)
Dept: PHARMACY | Facility: HOSPITAL | Age: 60
End: 2020-11-20

## 2020-11-20 NOTE — PROGRESS NOTES
[facility] telephone follow up- Capecitabine    No answer today.  direct line [phone].     Thanks,   Lubna Gupta, DimitriosD

## 2020-11-23 ENCOUNTER — MEDICATION THERAPY MANAGEMENT (OUTPATIENT)
Dept: PHARMACY | Facility: HOSPITAL | Age: 60
End: 2020-11-23

## 2020-11-23 NOTE — PROGRESS NOTES
[facility] telephone follow up- capecitabine    No answer today.  direct line [phone].     Thanks,   Lubna Gupta, DimitriosD

## 2020-11-24 ENCOUNTER — OFFICE VISIT (OUTPATIENT)
Dept: PSYCHIATRY | Facility: HOSPITAL | Age: 60
End: 2020-11-24

## 2020-11-24 DIAGNOSIS — F41.1 GENERALIZED ANXIETY DISORDER: Primary | ICD-10-CM

## 2020-11-24 DIAGNOSIS — F33.1 MAJOR DEPRESSIVE DISORDER, RECURRENT EPISODE, MODERATE (HCC): ICD-10-CM

## 2020-11-24 PROCEDURE — 90792 PSYCH DIAG EVAL W/MED SRVCS: CPT | Performed by: NURSE PRACTITIONER

## 2020-11-24 RX ORDER — DULOXETIN HYDROCHLORIDE 30 MG/1
30 CAPSULE, DELAYED RELEASE ORAL 2 TIMES DAILY
Qty: 60 CAPSULE | Refills: 2 | Status: SHIPPED | OUTPATIENT
Start: 2020-11-24 | End: 2020-12-22 | Stop reason: SDUPTHER

## 2020-11-25 ENCOUNTER — MEDICATION THERAPY MANAGEMENT (OUTPATIENT)
Dept: PHARMACY | Facility: HOSPITAL | Age: 60
End: 2020-11-25

## 2020-11-25 NOTE — PROGRESS NOTES
[facility] telephone follow up- Capecitabine    Martha is doing well today. She continues to have some issues with HFS; she tells me that this has stayed about the same. She continues to use CBD balm and occasional benadryl to help. Medication administration and adherence seem appropriate; she reports one missed dose this past cycle. Martha tells me that she will start a new medication prescribed by LISA Green. Upon further review, this new medication is Cymbalta and there are no DDI with capecitabine. Martha has no additional questions or concerns today. Pharmacy will continue to follow.     Thanks,   Lubna Gupta, PharmD

## 2020-11-30 ENCOUNTER — HOSPITAL ENCOUNTER (OUTPATIENT)
Dept: NUCLEAR MEDICINE | Facility: HOSPITAL | Age: 60
Discharge: HOME | End: 2020-11-30

## 2020-11-30 ENCOUNTER — HOSPITAL ENCOUNTER (OUTPATIENT)
Dept: CT IMAGING | Facility: HOSPITAL | Age: 60
Discharge: HOME | End: 2020-11-30
Admitting: INTERNAL MEDICINE

## 2020-11-30 DIAGNOSIS — C50.811 MALIGNANT NEOPLASM OF OVERLAPPING SITES OF RIGHT BREAST IN FEMALE, ESTROGEN RECEPTOR NEGATIVE (HCC): ICD-10-CM

## 2020-11-30 DIAGNOSIS — Z17.1 MALIGNANT NEOPLASM OF OVERLAPPING SITES OF RIGHT BREAST IN FEMALE, ESTROGEN RECEPTOR NEGATIVE (HCC): ICD-10-CM

## 2020-11-30 LAB — CREAT BLDA-MCNC: 0.9 MG/DL (ref 0.6–1.3)

## 2020-11-30 PROCEDURE — 78306 BONE IMAGING WHOLE BODY: CPT

## 2020-11-30 PROCEDURE — A9503 TC99M MEDRONATE: HCPCS | Performed by: INTERNAL MEDICINE

## 2020-11-30 PROCEDURE — 74177 CT ABD & PELVIS W/CONTRAST: CPT

## 2020-11-30 PROCEDURE — 82565 ASSAY OF CREATININE: CPT

## 2020-11-30 PROCEDURE — 0 TECHNETIUM MEDRONATE KIT: Performed by: INTERNAL MEDICINE

## 2020-11-30 PROCEDURE — 25010000002 IOPAMIDOL 61 % SOLUTION: Performed by: INTERNAL MEDICINE

## 2020-11-30 PROCEDURE — 71260 CT THORAX DX C+: CPT

## 2020-11-30 RX ORDER — TC 99M MEDRONATE 20 MG/10ML
20.3 INJECTION, POWDER, LYOPHILIZED, FOR SOLUTION INTRAVENOUS
Status: COMPLETED | OUTPATIENT
Start: 2020-11-30 | End: 2020-11-30

## 2020-11-30 RX ADMIN — Medication 20.3 MILLICURIE: at 11:20

## 2020-11-30 RX ADMIN — IOPAMIDOL 85 ML: 612 INJECTION, SOLUTION INTRAVENOUS at 12:34

## 2020-12-03 NOTE — PROGRESS NOTES
Subjective .     REASONS FOR FOLLOWUP:    1. Stage Ib ([id number]) right breast cancer: Diagnosed May 2010 with excisional biopsy for invasive ductal carcinoma, 1.3 cm, grade 2, ER 90%, NH 80%, HER-2/peter negative (1+ IHC).  Subsequent right mastectomy in July 2010 with no residual breast malignancy, 1/5 sentinel lymph node with micrometastasis (0.25 mm).  Treated in the [facility] system with adjuvant AC ×4 cycles in 2010 (no taxanes administered due to underlying Charcot-Saloni-Tooth with peripheral neuropathy).  Adjuvant Femara (postmenopausal) initiated October 2010 with plan to treat ×10 years.  Genetic testing reportedly negative.  Developed osteopenia treated with Prolia beginning 2/27/13.  2. Recurrent/metastatic disease identified 10/8/17 involving thoracic spine with cord compression at T6, lumbosacral involvement, sternal and right sternoclavicular involvement.  Femara discontinued.  Radiation administered (in the [facility] system) to the thoracic spine beginning 10/19/17 treating T3-T9 to a dose of 24 gray in 6 fractions.  3. Evaluation with MRI 12/8/17 showing persistent T6 cord compression with persistent neurologic compromise requiring surgical treatment 12/11/17 with T6 laminectomy/corpectomy and T3-T9 fusion.  Pathology with metastatic carcinoma of breast origin, ER negative, NH negative, HER-2/peter negative (1+ IHC).  4. Right pulmonary embolism 10/21/17 treated with Lovenox 1 mg/kg (100 mg) every 12 hours.  No evidence of DVT.  Lovenox held due to right gluteus hematoma 3/23/18 through 4/6/18.  Transitioned to oral Eliquis 5mg bid 1/28/19 (as of 2/25/19, patient still using previous supply of Lovenox).  5. Cancer related pain previously on Duragesic 50 µg patch every 72 hours and Dilaudid 4 mg as needed for breakthrough pain.  Narcotics subsequently discontinued with improvement in pain in January 2018.  6. Radiation therapy to L3 dural metastasis and left humerus initiated 1/15/18 (10 fractions),  completed 1/26/18  7. Monthly Xgeva initiated 1/23/18  8. Mild hypercalcemia of malignancy  9. Initiation of palliative oral single agent Xeloda 2/7/18 (2000 mg a.m., 1500 mg p.m. for 14/21 days).  10. 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  In reviewing prior CT scans, has been present for some time.  Monitor for now, if it enlarges consider surgical excision.  11. Cumulative side effects from Xeloda with fatigue, anorexia, diarrhea, increased tearing.  Alteration in dose/schedule with cycle 11 to 1500 mg twice a day for 7 days on followed by 7 days off.    HISTORY OF PRESENT ILLNESS:  The patient is a 60 y.o. year old female who is here for follow-up with the above-mentioned history.  The patient continues on Xeloda    History of Present Illness the patient returns today in follow-up continuing on oral [id number] mg twice daily 7 days on followed by 7 days off and also continuing on monthly Xgeva that is due today with laboratory studies as well as CT scans and bone scan to review.  The patient has experienced ongoing difficulty with hand-foot syndrome related to oral Xeloda.  She does use emollient cream frequently throughout the day, up to 6 times per day and does wear socks and gloves at night.  She has experienced an area of excoriation on the left great toe that she was concerned about and was seen in the [facility] wound care clinic.  There were no additional recommendations provided.  She does note that she developed some increased mid back pain recently after wrapping some presents at night and has been using tramadol and Flexeril with improvement.  She is not having any significant pain in the left hip region.  She was seen recently in the supportive oncology clinic on 12/2/2020 and was started on Cymbalta 30 mg twice a day and is  now taking gabapentin, currently at 300 mg at dinner and 300 mg at night.  She was intolerant of 600 mg at night.  Her dry eyes are stable taking Xiidra drops.  She continues on Eliquis anticoagulation and has not experienced any bleeding issues.  She notes a normal appetite and reports no change in her oral intake however her weight has declined by 5 pounds recently.    Past Medical History:   Diagnosis Date   • Acute pulmonary embolism, cancer-related 10/2017   • Allergic rhinitis    • Arthritis     Right knee; left shoulder   • Cancer (CMS/HCC) 2010    Right breast   • Cancer (CMS/HCC) 10/2017    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   • Charcot-Saloni-Tooth disease    • CPAP (continuous positive airway pressure) dependence    • Dry eye    • GERD (gastroesophageal reflux disease)    • H/O Colon polyps    • H/O Uterine fibroid    • Heart murmur    • Hyperlipidemia    • Hypertension    • PONV (postoperative nausea and vomiting)      Past Surgical History:   Procedure Laterality Date   • BLADDER SURGERY      Bladder lift   • BREAST RECONSTRUCTION, BREAST TISSUE EXPANDER INSERTION Right    • BREAST SURGERY Right 2010    Mastectomy   •  SECTION  ,    • CHOLECYSTECTOMY     • COLONOSCOPY     • HERNIA REPAIR  2015    [facility]   • HYSTERECTOMY      Partial   • KNEE SURGERY Right    • LEG SURGERY Left     Broken   • MASTECTOMY Right    • ID TREAT TIBIAL SHAFT FX, INTRAMED IMPLANT Left 2017    Procedure: TIBIA INTRAMEDULLARY NAIL/DON INSERTION;  Surgeon: Romero Shanks MD;  Location: [facility];  Service: Orthopedics   • THORACIC DECOMPRESSION POSTERIOR FUSION WITH INSTRUMENTATION N/A 2017    Procedure: T6 costotransversectomy and decompression with T3 to  T9 posterior spinal fusion [facility];  Surgeon: Quan Nayak MD;  Location: [facility];  Service:        ONCOLOGIC HISTORY:  (History from previous dates can be  found in the separate document.)    Current Outpatient Medications on File Prior to Visit   Medication Sig Dispense Refill   • acetaminophen (TYLENOL) 500 MG tablet Take 500 mg by mouth Every 6 (Six) Hours As Needed for Mild Pain .     • calcium carbonate (OS-CARY) 600 MG tablet Take 600 mg by mouth 2 (Two) Times a Day With Meals.     • capecitabine (XELODA) 500 MG chemo tablet Take 3 tabs (1500 mg) by mouth twice a day for 7 days on then 7 days off. 84 tablet 3   • cholecalciferol (VITAMIN D3) 1000 units tablet Take 1,000 Units by mouth Daily.     • diphenoxylate-atropine (LOMOTIL) 2.5-0.025 MG per tablet Take 1 tablet by mouth 4 (Four) Times a Day As Needed for Diarrhea. 60 tablet 0   • DULoxetine (Cymbalta) 30 MG capsule Take 1 capsule by mouth 2 (Two) Times a Day. 60 capsule 2   • ELIQUIS 5 MG tablet tablet TAKE 1 TABLET BY MOUTH EVERY 12 HOURS 60 tablet 6   • FLONASE ALLERGY RELIEF 50 MCG/ACT nasal spray 2 sprays into each nostril daily.  11   • gabapentin (NEURONTIN) 300 MG capsule TAKE 1 CAPSULE BY MOUTH  TWICE DAILY 180 capsule 3   • ketoconazole (NIZORAL) 2 % cream Apply  topically to the appropriate area as directed Daily. 15 g 0   • losartan (COZAAR) 50 MG tablet Take 1 tablet by mouth Daily. 90 tablet 2   • mesalamine (LIALDA) 1.2 g EC tablet Take 1 tablet by mouth 2 (Two) Times a Day. 180 tablet 0   • metaxalone (Skelaxin) 800 MG tablet Take 1 tablet by mouth 3 (Three) Times a Day As Needed for Muscle Spasms. 30 tablet 3   • mupirocin (BACTROBAN) 2 % ointment JUANITA TO SURGICAL SITE AND COVER WITH BAND AID D UNTIL HEALED     • omeprazole (PriLOSEC) 40 MG capsule TAKE 1 CAPSULE DAILY 90 capsule 2   • ondansetron ODT (ZOFRAN-ODT) 8 MG disintegrating tablet Take 1 tablet by mouth Every 8 (Eight) Hours As Needed for Nausea or Vomiting. 30 tablet 1   • phenazopyridine (PYRIDIUM) 200 MG tablet Take 200 mg by mouth 3 (Three) Times a Day As Needed for bladder spasms.     • prochlorperazine (COMPAZINE) 10 MG tablet  Take 1 tablet by mouth Every 6 (Six) Hours As Needed for Nausea or Vomiting. 30 tablet 0   • sennosides-docusate sodium (SENOKOT-S) 8.6-50 MG tablet Take 2 tablets by mouth 2 (Two) Times a Day. 90 tablet 2   • sucralfate (CARAFATE) 1 g tablet Take 1 tablet by mouth 4 (Four) Times a Day. 120 tablet 2   • temazepam (RESTORIL) 15 MG capsule Take 1 capsule by mouth At Night As Needed for Sleep. 90 capsule 1   • traMADol (ULTRAM) 50 MG tablet Take 1 tablet by mouth Every 8 (Eight) Hours As Needed for Moderate Pain . 90 tablet 1   • triamcinolone (KENALOG) 0.1 % cream Apply  topically to the appropriate area as directed 2 (Two) Times a Day. 15 g 0   • trimethoprim (TRIMPEX) 100 MG tablet Take 100 mg by mouth Daily.     • XIIDRA 5 % ophthalmic solution INSTILL 1 DROP INTO EACH EYE BID.       Current Facility-Administered Medications on File Prior to Visit   Medication Dose Route Frequency Provider Last Rate Last Admin   • [COMPLETED] denosumab (XGEVA) injection 120 mg  120 mg Subcutaneous Once Ubaldo Hancock Jr., MD   120 mg at 12/07/20 1333       ALLERGIES:     Allergies   Allergen Reactions   • Hydrocodone Nausea Only   • Morphine And Related Nausea And Vomiting     Social History     Socioeconomic History   • Marital status:      Spouse name: Murray   • Number of children: 3   • Years of education: College   • Highest education level: Not on file   Occupational History     Employer: [employer]     Employer: RETIRED   Tobacco Use   • Smoking status: Never Smoker   • Smokeless tobacco: Never Used   Substance and Sexual Activity   • Alcohol use: Yes     Comment: social   • Drug use: No   • Sexual activity: Defer     Family History   Problem Relation Age of Onset   • Heart disease Mother    • Hyperlipidemia Mother    • Hypertension Mother    • Diabetes Father    • Charcot-Saloni-Tooth disease Father    • Heart disease Father    • Hypertension Father    • Heart failure Father    • Diabetes Sister    • Heart disease  Sister    • Hypertension Sister    • Heart disease Maternal Aunt    • Scoliosis Maternal Aunt    • Heart disease Maternal Uncle    • Diabetes Maternal Grandmother    • Heart disease Maternal Grandmother    • Hypertension Maternal Grandmother    • Uterine cancer Maternal Grandmother    • Heart disease Maternal Grandfather      Review of Systems   Constitutional: Positive for fatigue and unexpected weight change. Negative for activity change and fever.   HENT: Negative for congestion, mouth sores, nosebleeds, sore throat and voice change.    Eyes: Negative for discharge, itching and visual disturbance.   Respiratory: Negative for cough, shortness of breath and wheezing.    Cardiovascular: Negative for chest pain, palpitations and leg swelling.   Gastrointestinal: Negative for abdominal distention, abdominal pain, blood in stool, constipation, diarrhea, nausea and vomiting.   Endocrine: Negative for cold intolerance and heat intolerance.   Genitourinary: Negative for difficulty urinating, dysuria, flank pain, frequency, hematuria and urgency.   Musculoskeletal: Negative for arthralgias, back pain, joint swelling and myalgias.   Skin: Positive for rash and wound.   Neurological: Negative for dizziness, syncope, weakness, light-headedness, numbness and headaches.   Hematological: Negative for adenopathy. Does not bruise/bleed easily.   Psychiatric/Behavioral: Positive for sleep disturbance. Negative for confusion. The patient is not nervous/anxious.    Review of systems was obtained as outlined above      Objective      Vitals:    12/07/20 1250   BP: 116/78   Pulse: 96   Resp: 16   Temp: 97.7 °F (36.5 °C)      Current Status 12/7/2020   ECOG score 2   Pain 6/10    Physical Exam   Constitutional: She is oriented to person, place, and time. She appears well-developed.   Eyes: Conjunctivae are normal.   Neck: No thyromegaly present.   Cardiovascular: Normal rate and regular rhythm. Exam reveals no gallop and no friction  rub.   No murmur heard.  Pulmonary/Chest: Breath sounds normal. No respiratory distress.   Abdominal: Soft. Bowel sounds are normal. She exhibits no distension. There is no abdominal tenderness.   Musculoskeletal:      Comments: Lower extremity braces in place.  No significant lower extremity edema   Lymphadenopathy:        Head (right side): No submandibular adenopathy present.     She has no cervical adenopathy. No inguinal adenopathy noted on the right or left side.        Right: No supraclavicular adenopathy present.        Left: No supraclavicular adenopathy present.   Neurological: She is alert and oriented to person, place, and time. She displays normal reflexes. No cranial nerve deficit. She exhibits normal muscle tone.   Bilateral lower extremity strength, 4+/5   Skin: Skin is warm and dry. Rash noted.   Mild erythema involving the palms of the hands.  Slight skin cracking in the hands.  There is an area of slight excoriation in the inferior portion of the left great toe   Psychiatric: Her behavior is normal.       RECENT LABS:  Hematology WBC   Date Value Ref Range Status   12/07/2020 3.47 3.40 - 10.80 10*3/mm3 Final   11/04/2019 4.09 3.40 - 10.80 10*3/mm3 Final     RBC   Date Value Ref Range Status   12/07/2020 3.85 3.77 - 5.28 10*6/mm3 Final   11/04/2019 4.26 3.77 - 5.28 10*6/mm3 Final     Hemoglobin   Date Value Ref Range Status   12/07/2020 12.5 12.0 - 15.9 g/dL Final     Hematocrit   Date Value Ref Range Status   12/07/2020 37.6 34.0 - 46.6 % Final     Platelets   Date Value Ref Range Status   12/07/2020 218 140 - 450 10*3/mm3 Final        Lab Results   Component Value Date    GLUCOSE 107 (H) 12/07/2020    BUN 23 12/07/2020    CREATININE 0.89 12/07/2020    EGFRIFNONA 65 12/07/2020    EGFRIFAFRI 66 11/04/2019    BCR 25.8 (H) 12/07/2020    K 4.8 12/07/2020    CO2 30.9 (H) 12/07/2020    CALCIUM 9.5 12/07/2020    PROTENTOTREF 6.0 11/13/2020    ALBUMIN 4.30 12/07/2020    LABIL2 2.3 11/04/2019    AST 36 (H)  12/07/2020    ALT 35 (H) 12/07/2020     I did review CT chest abdomen pelvis and bone scan from 11/30/2020 as outlined below.  I did personally review CT images today.  Reviewed records from supportive oncology visit as outlined above and below.  Reviewed MRI left femur result 9/21/2020 as outlined below.    Assessment/Plan      1. Previous Stage Ib ([id number]) right breast cancer:  · Diagnosed May 2010 with excisional biopsy for invasive ductal carcinoma, 1.3 cm, grade 2, ER 90%, AL 80%, HER-2/peter negative (1+ IHC).    · Subsequent right mastectomy in July 2010 with no residual breast malignancy, 1/5 sentinel lymph node with micrometastasis (0.25 mm).    · Treated in the [facility] with adjuvant AC ×4 cycles in 2010 (no taxanes administered due to underlying Charcot-Saloni-Tooth with peripheral neuropathy).    · Adjuvant Femara (postmenopausal) initiated October 2010 with plan to treat ×10 years.    · Genetic testing reportedly negative.    · Developed osteopenia treated with Prolia beginning 2/27/13. Subsequently discontinued due to identification of metastatic disease.  2. Recurrent/metastatic disease identified 10/8/17:  · Disease involving thoracic spine with cord compression at T6, lumbosacral involvement, sternal and right sternoclavicular involvement.    · Femara discontinued in 10/2017.    · Radiation administered (in the [facility] system) to the thoracic spine beginning 10/19/17 treating T3-T9 to a dose of 24 gray in 6 fractions.  · Evaluation with MRI 12/8/17 showing persistent T6 cord compression with persistent neurologic compromise requiring surgical treatment 12/11/17 with T6 laminectomy/corpectomy and T3-T9 fusion.  Pathology with metastatic carcinoma of breast origin, ER negative, AL negative, HER-2/peter negative (1+ IHC).  · Additional staging evaluation 12/8/17 with no evidence of visceral metastatic disease, bone scan showing involvement of thoracic spine, sternum, left humerus, mid frontal bone.   No plane film correlate of left humerus lesion.  MRI lumbar spine with small intradural L3 metastasis.  CA 15-3 12/6/17- 17.  · Palliative radiation therapy to L3 dural metastasis and left humerus initiated 1/15/18 (10 fractions), completed 1/26/18.  · Hypercalcemia of malignancy with calcium in the 10-11 range.  · Initiation of monthly Xgeva 1/23/18.  · Baseline CT scan 1/30/18 with no evidence of visceral involvement.  Cluster of nodular opacities in the right lower lobe suspected to be infectious or related to bronchiolitis. Bone scan 1/30/18 showed postsurgical change in the thoracic spine, stable uptake in the frontal bone, no new areas of disease.  · Initiation of palliative oral single agent Xeloda 2/7/18 2000 mg a.m., 1500 mg p.m. for 14/21 days.   · Following 3 cycles xeloda, bone scan 4/4/18 showed no change from the prior study.  CT scan 4/4/18 showed a small pericardial effusion of unclear significance as well as a subcutaneous nodule in the right lateral chest wall.  Subsequent evaluation with echocardiogram 4/17/18 showed no evidence of pericardial effusion.  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  We discussed the possibility of surgical excision of the right subcutaneous chest wall lesion for more definitive diagnosis.  Reviewed previous CT images dating back to 12/8/17 and the lesion was present even at that time measuring around 1.7 cm although not commented on in the radiology report.  As this appears to be an indolent low-grade process unrelated to her breast cancer, recommendee foregoing surgical excision at this time and monitoring this area on future scans.  The patient agreed.    · Following 6 cycles of Xeloda, CT 6/6/18  showed stable findings, no evidence of progressive disease.  There was a comment regarding subcutaneous abnormality in the  anterior abdominal wall and this was related to Lovenox injection sites.  Bone scan 6/6/18 showed no interval change.   · CT scan and bone scan 8/13/18 following 9 cycles of Xeloda showed stable findings with no evidence of significant visceral metastases.  Her bone lesions appear stable on bone scan.  The spindle cell neoplasm in her right chest wall actually decreased in size from 2 cm down to 1.6 cm.    · The patient experienced some symptoms of diarrhea, anorexia, generalized weakness during cycle 9 Xeloda it was unclear whether this was related to a viral gastroenteritis or toxicity from treatment.  Symptoms recurred during cycle 10 and treatment was cut short by 2 days.  Symptoms attributed to Xeloda.  With cycle 11, dose and schedule altered to 1500 mg twice daily for 7 days on followed by 7 days off .  · CT scan 9/9/2020 with no significant changes.  Bone scan 9/9/2020 read as unchanged from prior studies however did note an area of slight activity in the medial left femur.  Contacted radiology and although this was not noted on prior reports appears to have been present.  Subsequent MRI left femur 9/21/2020 with cortical thickening and periosteal edema left iliac us muscle insertion to the medial left femur with no evidence of metastatic disease, favored to represent periosteal change secondary to insertional tendinitis.  · Most recent 3-month interval scans from 11/30/2020 with CT chest abdomen pelvis and bone scan showing stable findings.  · The patient returns today continuing on treatment with Xeloda 1500 mg twice daily 7 days on followed by 7 days off.  She is due for monthly Xgeva today as well.  Patient has 3-month interval CT scans and bone scan to review from 11/30/2020.  As above, scans showed stable findings.  Clinically the patient is doing relatively well although has had ongoing difficulty with hand-foot syndrome from Xeloda.  She is using frequent emollient cream up to 6 times per day and is  wearing socks and gloves at night.  She has a small area of excoriation around the left great toe and was seen in the wound care clinic with no additional recommendations.  The area will likely be fairly slow to heal.  I did ask her to cover this with a gauze dressing for cushioning in her shoe and to monitor this over time.  It would likely be slow to heal given her ongoing chemotherapy and the location of the abnormality.  We will send her back to the wound care clinic if this worsens in the future.  She feels that her hand-foot symptoms are stable and are providing adequate quality of life for her to proceed with treatment as planned.  We will order further scans again at a 3-month interval.  In 1 month she will have nurse practitioner visit and monthly Xgeva and in 2 months I will see her back with Xgeva.  In 3 months I will see her again when she is due for Xgeva and we will review her scans.  She will continue her current dosing and schedule of Xeloda.  3. Right pulmonary embolism:  · Diagnosed on CT angiogram 10/21/17 involving small right lower lobe pulmonary artery.  Lower extremity Dopplers negative.  · Bilateral lower extremity Dopplers negative again 12/5/17.  · Received chronic Lovenox 1 mg/kg twice per day, transition to oral Eliquis in February 2019, continuing on Eliquis 5 mg twice daily.  · Patient has no bleeding issues on anticoagulation.  4. Cancer related pain:  · Previously receiving Duragesic 50 µg patch every 72 hours along with Dilaudid 4 mg as needed for breakthrough pain  · The patient's pain improved over time and she was able to discontinue both Duragesic and Dilaudid in the interval.  · Patient does take occasional Flexeril at bedtime due to back spasm/pain when she has been more active.  · The patient does have some occasional aggravation of her chronic back pain.    · The patient did have a slight exacerbation of her back pain after wrapping presents on the floor at night however  reports that this is now improved and has been taking some tramadol and Flexeril.  5. Chemotherapy-induced diarrhea with subsequent C. difficile colitis in the setting of previous ulcerative colitis:  · Patient with reported history of ulcerative colitis, is not on active therapy.  · The patient developed initial diarrhea related to Xeloda at regular dosing.  · Symptoms improved on reduced dose Xeloda  · Flare of symptoms in October 2018 with apparent finding of C. difficile colitis by GI, treated with course of oral vancomycin with improvement in symptoms.  · Patient notes minimal intermittent diarrhea on Xeloda requiring occasional dosing of Imodium.  This is unchanged.  6. Traumatic left tibia/fibular fracture:  · Status post ORIF 12/6/17  · Specimen was sent for pathologic review, negative for evidence of malignancy  7. Hypercalcemia:  · Suspect hypercalcemia of malignancy, calcium in  10-11 range previously.  · Calcium normalized following initiation of monthly Xgeva on 1/23/18.  8. Chemotherapy-induced mucositis:  · Patient had a minimal degree of mucositis with cycle 2.  The patient has magic mouthwash to use as needed.  No subsequent mucositis.  9. Recurrent UTI, bladder wall thickening on CT:  · Patient had an enterococcal UTI on 3/2/18 sensitive to nitrofurantoin and received treatment, unclear how long.  · Recurrent UTI 3/20/18 with urine culture growing Klebsiella, initially treated with nitrofurantoin, transitioned to Levaquin.  · CT 4/4/18 with diffuse bladder wall thickening with increased nodular thickening at the left base.  Referral to urogynecology Dr. May Johnson.  She was placed on a prophylactic dose of trimethoprim 100 mg daily, bladder wall thickening felt to be related to recent recurrent urinary tract infections.  · Patient with urinary symptoms, treated with course of Macrobid at the end of December 2018, urine culture however was negative 12/31/18.  · Patient was found to have Klebsiella  UTI 7/29/2019 which was successfully treated with Macrobid with complete resolution of symptoms.  10.   Mobility:  · The patient underwent an intensive course of rehabilitation at [facility].  She graduated from her outpatient course November 2018.  · Overall the patient has improved dramatically in terms of mobility, now walking around 1 mile per day without assistance.  11.  Depression:  · The patient weaned herself off of Cymbalta and reports that her symptoms remain stable.  12. 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  Symptoms did improve and she continued on the same dose.    · As outlined above, the patient's symptoms have worsened gradually over time.  She does have a small area of excoriation left great toe.  We will monitor this closely and refer her back to the wound care clinic if it worsens.  She will continue with frequent application of emollient cream (currently up to 6 times per day) and patient will continue to wear cotton gloves and socks at night.  She does feel that her symptoms are acceptable in terms of her quality of life at this time to forego any further dose adjustment of her Xeloda.  13. Evidence of steatosis on scans with mild intermittent elevated liver function studies:  · Liver function studies increased 8/20/19 with ALT 98, AST 70, normal total bilirubin.  · Negative viral hepatitis A, B, and C panel 8/23/18  · Likely related to hepatic steatosis.   · Subsequent improvement in LFTs  · Today, minimal elevation in ALT at 35, AST 36  14. Chemotherapy induced leukopenia:  · WBC today 3.47 with normal differential  15. GERD:  · The patient continues on omeprazole 40 mg daily (2 x 20 mg).  · The patient was also prescribed Carafate 1 g 4 times daily  · Symptoms have improved recently.  16. Insomnia:  · Patient  with prior paradoxical reaction to Benadryl  · Improved previously on temazepam 15 mg nightly as needed.   · Patient noted subsequently that temazepam was having no effect.    · Patient is currently taking gabapentin 300 mg in the evening and 300 mg at night which has helped.  17. Health maintenance:  · Patient notes that she has a history of colon polyps as well as ulcerative colitis and was due for a follow-up colonoscopy on 9/12/2019.  We did discuss there is no necessity to pursue colonoscopy in the setting of her metastatic breast cancer.    · The patient did undergo colonoscopy on 2/7/2020 with findings of muscular hypertrophy and diverticulosis.   18. Right shoulder pain:  · Patient was evaluated by Dr Forrester.  She has experienced difficulty over the past year and a half with her right shoulder although she has not complained of this in prior visits to our office.  She reports difficulty with abduction.    · The patient did undergo MRI of her right shoulder on 11/21/2019 at an outside facility showing multiple abnormalities including supraspinatus tendinosis, labral tear but no evidence of metastatic disease.  · Symptoms subsequently resolved.  19. Ocular changes in part related to Xeloda:  · Patient experienced a mild degree of blurred vision as well as burning and pruritus.  · She was seen by her ophthalmologist and has been continuing on xiidra ophthalmic drops which have helped.  · Likely both issues are to some extent related to Xeloda.  · Symptoms currently stable to improved.  20. Anxiety/depression:  · Patient was seen in the supportive oncology clinic on 12/2/2020 was started on Cymbalta 30 mg twice daily.  She is also continuing currently on gabapentin 300 mg at dinner and 300 mg at night.  · Patient reports that her symptoms have improved.  She does have follow-up scheduled in supportive oncology clinic on 12/22/2020.    Plan:  1. Continue oral Xeloda 1500 mg twice a day 7 days on followed by 7  days off.  2. Monthly Xgeva today  3. Continue Eliquis 5 mg twice daily.  4. Continue use of emollient cream on the hands and feet and continue to wear socks and gloves at night  5. Continue omeprazole 40 mg daily and Carafate 1 g 4 times daily.    6. Continue xiidra ophthalmic drops prescribed by ophthalmology  7. In 4 weeks nurse practitioner visit with CBC, CMP, magnesium, phosphorus and Xgeva  8. In 8 weeks MD visit with CBC, CMP, magnesium, phosphorus and Xgeva  9. In 11 weeks CT chest abdomen pelvis and bone scan  10. In 12 weeks MD visit with CBC, CMP, magnesium, phosphorus and Xgeva.  We will review scan results.    Patient continuing on high risk medication requiring intensive monitoring.

## 2020-12-07 ENCOUNTER — INFUSION (OUTPATIENT)
Dept: ONCOLOGY | Facility: HOSPITAL | Age: 60
End: 2020-12-07

## 2020-12-07 ENCOUNTER — MEDICATION THERAPY MANAGEMENT (OUTPATIENT)
Dept: PHARMACY | Facility: HOSPITAL | Age: 60
End: 2020-12-07

## 2020-12-07 ENCOUNTER — OFFICE VISIT (OUTPATIENT)
Dept: ONCOLOGY | Facility: CLINIC | Age: 60
End: 2020-12-07

## 2020-12-07 ENCOUNTER — LAB (OUTPATIENT)
Dept: OTHER | Facility: HOSPITAL | Age: 60
End: 2020-12-07

## 2020-12-07 VITALS
HEART RATE: 96 BPM | WEIGHT: 185 LBS | SYSTOLIC BLOOD PRESSURE: 116 MMHG | BODY MASS INDEX: 33.84 KG/M2 | RESPIRATION RATE: 16 BRPM | TEMPERATURE: 97.7 F | DIASTOLIC BLOOD PRESSURE: 78 MMHG

## 2020-12-07 DIAGNOSIS — Z17.1 MALIGNANT NEOPLASM OF OVERLAPPING SITES OF RIGHT BREAST IN FEMALE, ESTROGEN RECEPTOR NEGATIVE (HCC): Primary | ICD-10-CM

## 2020-12-07 DIAGNOSIS — C50.811 MALIGNANT NEOPLASM OF OVERLAPPING SITES OF RIGHT BREAST IN FEMALE, ESTROGEN RECEPTOR NEGATIVE (HCC): Primary | ICD-10-CM

## 2020-12-07 DIAGNOSIS — C50.811 MALIGNANT NEOPLASM OF OVERLAPPING SITES OF RIGHT BREAST IN FEMALE, ESTROGEN RECEPTOR NEGATIVE (HCC): ICD-10-CM

## 2020-12-07 DIAGNOSIS — Z17.1 MALIGNANT NEOPLASM OF OVERLAPPING SITES OF RIGHT BREAST IN FEMALE, ESTROGEN RECEPTOR NEGATIVE (HCC): ICD-10-CM

## 2020-12-07 LAB
ALBUMIN SERPL-MCNC: 4.3 G/DL (ref 3.5–5.2)
ALBUMIN/GLOB SERPL: 2 G/DL
ALP SERPL-CCNC: 47 U/L (ref 39–117)
ALT SERPL W P-5'-P-CCNC: 35 U/L (ref 1–33)
ANION GAP SERPL CALCULATED.3IONS-SCNC: 4.1 MMOL/L (ref 5–15)
AST SERPL-CCNC: 36 U/L (ref 1–32)
BASOPHILS # BLD AUTO: 0.04 10*3/MM3 (ref 0–0.2)
BASOPHILS NFR BLD AUTO: 1.2 % (ref 0–1.5)
BILIRUB SERPL-MCNC: 0.6 MG/DL (ref 0–1.2)
BUN SERPL-MCNC: 23 MG/DL (ref 8–23)
BUN/CREAT SERPL: 25.8 (ref 7–25)
CALCIUM SPEC-SCNC: 9.5 MG/DL (ref 8.6–10.5)
CHLORIDE SERPL-SCNC: 105 MMOL/L (ref 98–107)
CO2 SERPL-SCNC: 30.9 MMOL/L (ref 22–29)
CREAT SERPL-MCNC: 0.89 MG/DL (ref 0.57–1)
DEPRECATED RDW RBC AUTO: 57.9 FL (ref 37–54)
EOSINOPHIL # BLD AUTO: 0.1 10*3/MM3 (ref 0–0.4)
EOSINOPHIL NFR BLD AUTO: 2.9 % (ref 0.3–6.2)
ERYTHROCYTE [DISTWIDTH] IN BLOOD BY AUTOMATED COUNT: 16.4 % (ref 12.3–15.4)
GFR SERPL CREATININE-BSD FRML MDRD: 65 ML/MIN/1.73
GLOBULIN UR ELPH-MCNC: 2.2 GM/DL
GLUCOSE SERPL-MCNC: 107 MG/DL (ref 65–99)
HCT VFR BLD AUTO: 37.6 % (ref 34–46.6)
HGB BLD-MCNC: 12.5 G/DL (ref 12–15.9)
IMM GRANULOCYTES # BLD AUTO: 0 10*3/MM3 (ref 0–0.05)
IMM GRANULOCYTES NFR BLD AUTO: 0 % (ref 0–0.5)
LYMPHOCYTES # BLD AUTO: 0.87 10*3/MM3 (ref 0.7–3.1)
LYMPHOCYTES NFR BLD AUTO: 25.1 % (ref 19.6–45.3)
MAGNESIUM SERPL-MCNC: 2.1 MG/DL (ref 1.6–2.4)
MCH RBC QN AUTO: 32.5 PG (ref 26.6–33)
MCHC RBC AUTO-ENTMCNC: 33.2 G/DL (ref 31.5–35.7)
MCV RBC AUTO: 97.7 FL (ref 79–97)
MONOCYTES # BLD AUTO: 0.33 10*3/MM3 (ref 0.1–0.9)
MONOCYTES NFR BLD AUTO: 9.5 % (ref 5–12)
NEUTROPHILS NFR BLD AUTO: 2.13 10*3/MM3 (ref 1.7–7)
NEUTROPHILS NFR BLD AUTO: 61.3 % (ref 42.7–76)
NRBC BLD AUTO-RTO: 0 /100 WBC (ref 0–0.2)
PHOSPHATE SERPL-MCNC: 3.4 MG/DL (ref 2.5–4.5)
PLATELET # BLD AUTO: 218 10*3/MM3 (ref 140–450)
PMV BLD AUTO: 11.1 FL (ref 6–12)
POTASSIUM SERPL-SCNC: 4.8 MMOL/L (ref 3.5–5.2)
PROT SERPL-MCNC: 6.5 G/DL (ref 6–8.5)
RBC # BLD AUTO: 3.85 10*6/MM3 (ref 3.77–5.28)
SODIUM SERPL-SCNC: 140 MMOL/L (ref 136–145)
WBC # BLD AUTO: 3.47 10*3/MM3 (ref 3.4–10.8)

## 2020-12-07 PROCEDURE — 25010000002 DENOSUMAB 120 MG/1.7ML SOLUTION: Performed by: INTERNAL MEDICINE

## 2020-12-07 PROCEDURE — 80053 COMPREHEN METABOLIC PANEL: CPT | Performed by: INTERNAL MEDICINE

## 2020-12-07 PROCEDURE — 96372 THER/PROPH/DIAG INJ SC/IM: CPT

## 2020-12-07 PROCEDURE — 84100 ASSAY OF PHOSPHORUS: CPT | Performed by: INTERNAL MEDICINE

## 2020-12-07 PROCEDURE — 36415 COLL VENOUS BLD VENIPUNCTURE: CPT

## 2020-12-07 PROCEDURE — 99215 OFFICE O/P EST HI 40 MIN: CPT | Performed by: INTERNAL MEDICINE

## 2020-12-07 PROCEDURE — 85025 COMPLETE CBC W/AUTO DIFF WBC: CPT | Performed by: INTERNAL MEDICINE

## 2020-12-07 PROCEDURE — 83735 ASSAY OF MAGNESIUM: CPT | Performed by: INTERNAL MEDICINE

## 2020-12-07 RX ADMIN — DENOSUMAB 120 MG: 120 INJECTION SUBCUTANEOUS at 13:33

## 2020-12-07 NOTE — PROGRESS NOTES
[facility] telemedicine encounter- capecitabine    Visit started out as telemedicine via [employer]. I switched to contact via telephone due to crackling/technical difficulties. Martha is doing well today. She states that HFS on her hands is stable currently. She is concerned about her bid toe and she will have MD assess this today. Medication administration and adherence seem appropriate; she reports no missed doses. Martha is doing well and feeling well on her Cymbalta. She has not had any other medication changes. Pharmacy will continue to follow.     Thanks,   Lubna Gupta.

## 2020-12-08 ENCOUNTER — MEDICATION THERAPY MANAGEMENT (OUTPATIENT)
Dept: PHARMACY | Facility: HOSPITAL | Age: 60
End: 2020-12-08

## 2020-12-08 NOTE — PROGRESS NOTES
[facility] Lab Review- Capecitabine      12/7/2020   WBC 3.40 - 10.80 10*3/mm3 3.47   Neutrophils Absolute 1.70 - 7.00 10*3/mm3 2.13   Hemoglobin 12.0 - 15.9 g/dL 12.5   Hematocrit 34.0 - 46.6 % 37.6   Platelets 140 - 450 10*3/mm3 218   Creatinine 0.57 - 1.00 mg/dL 0.89   eGFR Non African Am >60 mL/min/1.73 65   BUN 8 - 23 mg/dL 23   Sodium 136 - 145 mmol/L 140   Potassium 3.5 - 5.2 mmol/L 4.8   Glucose 65 - 99 mg/dL 107 (A)   Magnesium 1.6 - 2.4 mg/dL 2.1   Calcium 8.6 - 10.5 mg/dL 9.5   Albumin 3.50 - 5.20 g/dL 4.30   Total Protein 6.0 - 8.5 g/dL 6.5   AST (SGOT) 1 - 32 U/L 36 (A)   ALT (SGPT) 1 - 33 U/L 35 (A)   Alkaline Phosphatase 39 - 117 U/L 47   Total Bilirubin 0.0 - 1.2 mg/dL 0.6   Phosphorus 2.5 - 4.5 mg/dL 3.4     MD dictation noted. Pharmacy will continue to follow.     Thanks,   Dimitrios MorelosD

## 2020-12-22 ENCOUNTER — OFFICE VISIT (OUTPATIENT)
Dept: PSYCHIATRY | Facility: HOSPITAL | Age: 60
End: 2020-12-22

## 2020-12-22 ENCOUNTER — TELEPHONE (OUTPATIENT)
Dept: PSYCHIATRY | Facility: HOSPITAL | Age: 60
End: 2020-12-22

## 2020-12-22 DIAGNOSIS — F33.1 MAJOR DEPRESSIVE DISORDER, RECURRENT EPISODE, MODERATE (HCC): Primary | ICD-10-CM

## 2020-12-22 DIAGNOSIS — F41.1 GENERALIZED ANXIETY DISORDER: ICD-10-CM

## 2020-12-22 PROCEDURE — 99214 OFFICE O/P EST MOD 30 MIN: CPT | Performed by: NURSE PRACTITIONER

## 2020-12-22 RX ORDER — DULOXETIN HYDROCHLORIDE 30 MG/1
30 CAPSULE, DELAYED RELEASE ORAL 2 TIMES DAILY
Qty: 180 CAPSULE | Refills: 0 | Status: SHIPPED | OUTPATIENT
Start: 2020-12-22 | End: 2021-03-03

## 2020-12-22 NOTE — PROGRESS NOTES
[facility]cology [facility]  In Person Session    Subjective  Patient ID: Martha Davey is a 60 y.o. female is seen face to face in the [facility] ([facility].    Pt noted to be alert, oriented, and engaged in conversation. Affect and mood dysthymic.  Pt discusses grief response as today is the birthday of friend's son who  in car accident. Acknowledge challenges feeling helpless and not knowing what to say.  Home challenges persist, specifically to include declining health of , care for mother, and management of familial relationships that have always been challenging.  Patient does acknowledge increased dedication to walking, which she does with her , and has been appreciated. Uses walking sticks to assist with balance.    Explores appreciation of lessed political conflict, and feels anxiety surrounding this has decreased.  Mood remains demoralized and sad. Acknowldges sense of distress surrounding the expectations and demands of others.  Has appreciated limited connection to social network, although continues to mourn isolation.  Sleep described as being improved with addition of gabapentin. Is taking 300 at dinner and 300 mg at bedtime. Describes to be sleeping better.  Pain is reported to be manageable. Somewhat variable with activity. Pain stays about 3-4 on a scale of 1-10.     Medications Reviewed:  Cymbalta 30 mg daily  Gabapentin 300 mg q dinner and q hs    Diagnoses and all orders for this visit:    1. Major depressive disorder, recurrent episode, moderate (CMS/HCC) (Primary)    2. Generalized anxiety disorder    Other orders  -     DULoxetine (Cymbalta) 30 MG capsule; Take 1 capsule by mouth 2 (Two) Times a Day.  Dispense: 180 capsule; Refill: 0    Plan of Care  Patient continues with symptoms of depression, demoralization, and complicated social expectations. Medication benefit reviewed.  Patient has positively tolerated Cymbalta 30 mg daily; advised to increase  to 30 mg twice daily.  Continue gabapentin 300 mg q. dinner and 300 mg nightly. Counseling provided regarding benefits of reconciling relationships as able, even through use of empty chair therapy.  Explored personal limitations and boundaries and effective communication strategies.  Follow-up arranged in person in clinic in 4 weeks.    Total time spent  face to face with patient: 30 minutes.   24 minutes spent in supportive counseling surrounding issues of recognition and allowance of need for rest, importance of continued follow up , benefits of exercise, behavioral activation, medication education, anticipatory anxiety, exploration of QOL goals, effective communication strategies and positive and negative coping strategies.  Counseling provided surrounding grief and loss, family of origin versus family of choice, and exploration of personal need for reconciliation in various relationships.

## 2020-12-22 NOTE — TELEPHONE ENCOUNTER
[facility]    [facility]     Contact initiated at regularly scheduled appointment time; video and phone attempted.  LM with number to clinic and request for return call to reschedule.

## 2020-12-29 RX ORDER — MESALAMINE 1.2 G/1
TABLET, DELAYED RELEASE ORAL
Qty: 180 TABLET | Refills: 3 | Status: SHIPPED | OUTPATIENT
Start: 2020-12-29 | End: 2021-12-16

## 2021-01-04 ENCOUNTER — OFFICE VISIT (OUTPATIENT)
Dept: ONCOLOGY | Facility: CLINIC | Age: 61
End: 2021-01-04

## 2021-01-04 ENCOUNTER — LAB (OUTPATIENT)
Dept: OTHER | Facility: HOSPITAL | Age: 61
End: 2021-01-04

## 2021-01-04 ENCOUNTER — INFUSION (OUTPATIENT)
Dept: ONCOLOGY | Facility: HOSPITAL | Age: 61
End: 2021-01-04

## 2021-01-04 VITALS
TEMPERATURE: 97.8 F | HEIGHT: 62 IN | HEART RATE: 103 BPM | RESPIRATION RATE: 20 BRPM | DIASTOLIC BLOOD PRESSURE: 84 MMHG | SYSTOLIC BLOOD PRESSURE: 139 MMHG | WEIGHT: 183.2 LBS | OXYGEN SATURATION: 96 % | BODY MASS INDEX: 33.71 KG/M2

## 2021-01-04 DIAGNOSIS — Z17.1 MALIGNANT NEOPLASM OF OVERLAPPING SITES OF RIGHT BREAST IN FEMALE, ESTROGEN RECEPTOR NEGATIVE (HCC): ICD-10-CM

## 2021-01-04 DIAGNOSIS — C79.51 BONE METASTASES: ICD-10-CM

## 2021-01-04 DIAGNOSIS — Z79.899 HIGH RISK MEDICATION USE: ICD-10-CM

## 2021-01-04 DIAGNOSIS — Z17.1 MALIGNANT NEOPLASM OF OVERLAPPING SITES OF RIGHT BREAST IN FEMALE, ESTROGEN RECEPTOR NEGATIVE (HCC): Primary | ICD-10-CM

## 2021-01-04 DIAGNOSIS — C50.811 MALIGNANT NEOPLASM OF OVERLAPPING SITES OF RIGHT BREAST IN FEMALE, ESTROGEN RECEPTOR NEGATIVE (HCC): ICD-10-CM

## 2021-01-04 DIAGNOSIS — C50.811 MALIGNANT NEOPLASM OF OVERLAPPING SITES OF RIGHT BREAST IN FEMALE, ESTROGEN RECEPTOR NEGATIVE (HCC): Primary | ICD-10-CM

## 2021-01-04 DIAGNOSIS — Z79.01 ANTICOAGULANT LONG-TERM USE: ICD-10-CM

## 2021-01-04 LAB
ALBUMIN SERPL-MCNC: 4.3 G/DL (ref 3.5–5.2)
ALBUMIN/GLOB SERPL: 1.9 G/DL
ALP SERPL-CCNC: 51 U/L (ref 39–117)
ALT SERPL W P-5'-P-CCNC: 19 U/L (ref 1–33)
ANION GAP SERPL CALCULATED.3IONS-SCNC: 5.8 MMOL/L (ref 5–15)
AST SERPL-CCNC: 25 U/L (ref 1–32)
BASOPHILS # BLD AUTO: 0.04 10*3/MM3 (ref 0–0.2)
BASOPHILS NFR BLD AUTO: 1.4 % (ref 0–1.5)
BILIRUB SERPL-MCNC: 0.6 MG/DL (ref 0–1.2)
BUN SERPL-MCNC: 20 MG/DL (ref 8–23)
BUN/CREAT SERPL: 22.2 (ref 7–25)
CALCIUM SPEC-SCNC: 9.4 MG/DL (ref 8.6–10.5)
CHLORIDE SERPL-SCNC: 104 MMOL/L (ref 98–107)
CO2 SERPL-SCNC: 32.2 MMOL/L (ref 22–29)
CREAT SERPL-MCNC: 0.9 MG/DL (ref 0.57–1)
DEPRECATED RDW RBC AUTO: 59.8 FL (ref 37–54)
EOSINOPHIL # BLD AUTO: 0.14 10*3/MM3 (ref 0–0.4)
EOSINOPHIL NFR BLD AUTO: 4.9 % (ref 0.3–6.2)
ERYTHROCYTE [DISTWIDTH] IN BLOOD BY AUTOMATED COUNT: 16.3 % (ref 12.3–15.4)
GFR SERPL CREATININE-BSD FRML MDRD: 64 ML/MIN/1.73
GLOBULIN UR ELPH-MCNC: 2.3 GM/DL
GLUCOSE SERPL-MCNC: 158 MG/DL (ref 65–99)
HCT VFR BLD AUTO: 40.2 % (ref 34–46.6)
HGB BLD-MCNC: 13.1 G/DL (ref 12–15.9)
IMM GRANULOCYTES # BLD AUTO: 0 10*3/MM3 (ref 0–0.05)
IMM GRANULOCYTES NFR BLD AUTO: 0 % (ref 0–0.5)
LYMPHOCYTES # BLD AUTO: 0.85 10*3/MM3 (ref 0.7–3.1)
LYMPHOCYTES NFR BLD AUTO: 29.8 % (ref 19.6–45.3)
MAGNESIUM SERPL-MCNC: 1.9 MG/DL (ref 1.6–2.4)
MCH RBC QN AUTO: 32.3 PG (ref 26.6–33)
MCHC RBC AUTO-ENTMCNC: 32.6 G/DL (ref 31.5–35.7)
MCV RBC AUTO: 99.3 FL (ref 79–97)
MONOCYTES # BLD AUTO: 0.25 10*3/MM3 (ref 0.1–0.9)
MONOCYTES NFR BLD AUTO: 8.8 % (ref 5–12)
NEUTROPHILS NFR BLD AUTO: 1.57 10*3/MM3 (ref 1.7–7)
NEUTROPHILS NFR BLD AUTO: 55.1 % (ref 42.7–76)
NRBC BLD AUTO-RTO: 0 /100 WBC (ref 0–0.2)
PHOSPHATE SERPL-MCNC: 3.1 MG/DL (ref 2.5–4.5)
PLATELET # BLD AUTO: 220 10*3/MM3 (ref 140–450)
PMV BLD AUTO: 11 FL (ref 6–12)
POTASSIUM SERPL-SCNC: 4.3 MMOL/L (ref 3.5–5.2)
PROT SERPL-MCNC: 6.6 G/DL (ref 6–8.5)
RBC # BLD AUTO: 4.05 10*6/MM3 (ref 3.77–5.28)
SODIUM SERPL-SCNC: 142 MMOL/L (ref 136–145)
WBC # BLD AUTO: 2.85 10*3/MM3 (ref 3.4–10.8)

## 2021-01-04 PROCEDURE — 84100 ASSAY OF PHOSPHORUS: CPT | Performed by: INTERNAL MEDICINE

## 2021-01-04 PROCEDURE — 80053 COMPREHEN METABOLIC PANEL: CPT | Performed by: INTERNAL MEDICINE

## 2021-01-04 PROCEDURE — 99214 OFFICE O/P EST MOD 30 MIN: CPT | Performed by: NURSE PRACTITIONER

## 2021-01-04 PROCEDURE — 96372 THER/PROPH/DIAG INJ SC/IM: CPT

## 2021-01-04 PROCEDURE — 36415 COLL VENOUS BLD VENIPUNCTURE: CPT

## 2021-01-04 PROCEDURE — 25010000002 DENOSUMAB 120 MG/1.7ML SOLUTION: Performed by: NURSE PRACTITIONER

## 2021-01-04 PROCEDURE — 85025 COMPLETE CBC W/AUTO DIFF WBC: CPT | Performed by: INTERNAL MEDICINE

## 2021-01-04 PROCEDURE — 83735 ASSAY OF MAGNESIUM: CPT | Performed by: INTERNAL MEDICINE

## 2021-01-04 RX ORDER — TRAMADOL HYDROCHLORIDE 50 MG/1
TABLET ORAL
Qty: 90 TABLET | Refills: 0 | Status: SHIPPED | OUTPATIENT
Start: 2021-01-04 | End: 2021-02-01 | Stop reason: SDUPTHER

## 2021-01-04 RX ADMIN — DENOSUMAB 120 MG: 120 INJECTION SUBCUTANEOUS at 10:52

## 2021-01-04 NOTE — PROGRESS NOTES
Subjective .     REASONS FOR FOLLOWUP:    1. Stage Ib ([id number]) right breast cancer: Diagnosed May 2010 with excisional biopsy for invasive ductal carcinoma, 1.3 cm, grade 2, ER 90%, VT 80%, HER-2/peter negative (1+ IHC).  Subsequent right mastectomy in July 2010 with no residual breast malignancy, 1/5 sentinel lymph node with micrometastasis (0.25 mm).  Treated in the [facility] system with adjuvant AC ×4 cycles in 2010 (no taxanes administered due to underlying Charcot-Saloni-Tooth with peripheral neuropathy).  Adjuvant Femara (postmenopausal) initiated October 2010 with plan to treat ×10 years.  Genetic testing reportedly negative.  Developed osteopenia treated with Prolia beginning 2/27/13.  2. Recurrent/metastatic disease identified 10/8/17 involving thoracic spine with cord compression at T6, lumbosacral involvement, sternal and right sternoclavicular involvement.  Femara discontinued.  Radiation administered (in the [facility] system) to the thoracic spine beginning 10/19/17 treating T3-T9 to a dose of 24 gray in 6 fractions.  3. Evaluation with MRI 12/8/17 showing persistent T6 cord compression with persistent neurologic compromise requiring surgical treatment 12/11/17 with T6 laminectomy/corpectomy and T3-T9 fusion.  Pathology with metastatic carcinoma of breast origin, ER negative, VT negative, HER-2/peter negative (1+ IHC).  4. Right pulmonary embolism 10/21/17 treated with Lovenox 1 mg/kg (100 mg) every 12 hours.  No evidence of DVT.  Lovenox held due to right gluteus hematoma 3/23/18 through 4/6/18.  Transitioned to oral Eliquis 5mg bid 1/28/19 (as of 2/25/19, patient still using previous supply of Lovenox).  5. Cancer related pain previously on Duragesic 50 µg patch every 72 hours and Dilaudid 4 mg as needed for breakthrough pain.  Narcotics subsequently discontinued with improvement in pain in January 2018.  6. Radiation therapy to L3 dural metastasis and left humerus initiated 1/15/18 (10 fractions),  completed 1/26/18  7. Monthly Xgeva initiated 1/23/18  8. Mild hypercalcemia of malignancy  9. Initiation of palliative oral single agent Xeloda 2/7/18 (2000 mg a.m., 1500 mg p.m. for 14/21 days).  10. 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  In reviewing prior CT scans, has been present for some time.  Monitor for now, if it enlarges consider surgical excision.  11. Cumulative side effects from Xeloda with fatigue, anorexia, diarrhea, increased tearing.  Alteration in dose/schedule with cycle 11 to 1500 mg twice a day for 7 days on followed by 7 days off.    HISTORY OF PRESENT ILLNESS:  The patient is a 60 y.o. year old female who is here for follow-up with the above-mentioned history.  The patient continues on Xeloda    History of Present Illness Ms. Davey is seen back today in follow-up, continuing on Xeloda 1500 mg twice daily for 7 days on, 7 days off along with monthly Xgeva.  After tomorrow she will enter her off week of Xeloda therapy.    As she is reviewed back today she is noting feeling more tired.  She reports having a rough weekend.  Celebrating New Year's she made the mistake of not really eating much, drinking coffee and then a few alcoholic beverages followed by then passing out.  She has been trying to drink electrolyte drinks since that time but is still feeling tired today.  Thankfully her chemistries do not reveal any dehydration.    We did review her CBC which shows her WBC is slightly down at 2.8 with ANC of 1.5.  This is lower than she has been on current therapy.  She denies any fever or infectious symptoms.    Patient continues with grade 1-2 erythroderma from Xeloda, stable with current emollient creams.  Patient did have an area of excoriation on her foot which is now almost healed thankfully.    She is requesting  a refill of her Eliquis today.  Otherwise she denies further concerns this time.    Past Medical History:   Diagnosis Date   • Acute pulmonary embolism, cancer-related 10/2017   • Allergic rhinitis    • Arthritis     Right knee; left shoulder   • Cancer (CMS/[facility]) 2010    Right breast   • Cancer (CMS/[facility]) 10/2017    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   • Charcot-Saloni-Tooth disease    • CPAP (continuous positive airway pressure) dependence    • Dry eye    • GERD (gastroesophageal reflux disease)    • H/O Colon polyps    • H/O Uterine fibroid    • Heart murmur    • Hyperlipidemia    • Hypertension    • PONV (postoperative nausea and vomiting)      Past Surgical History:   Procedure Laterality Date   • BLADDER SURGERY      Bladder lift   • BREAST RECONSTRUCTION, BREAST TISSUE EXPANDER INSERTION Right    • BREAST SURGERY Right 2010    Mastectomy   •  SECTION  ,    • CHOLECYSTECTOMY     • COLONOSCOPY     • HERNIA REPAIR  2015    [facility]   • HYSTERECTOMY      Partial   • KNEE SURGERY Right    • LEG SURGERY Left     Broken   • MASTECTOMY Right    • KY TREAT TIBIAL SHAFT FX, INTRAMED IMPLANT Left 2017    Procedure: TIBIA INTRAMEDULLARY NAIL/DON INSERTION;  Surgeon: Romero Shanks MD;  Location: [facility];  Service: Orthopedics   • THORACIC DECOMPRESSION POSTERIOR FUSION WITH INSTRUMENTATION N/A 2017    Procedure: T6 costotransversectomy and decompression with T3 to  T9 posterior spinal fusion with instrumentation with Jennifer Gonzalez and Nael Wilkes;  Surgeon: Quan Nayak MD;  Location: [facility];  Service:        ONCOLOGIC HISTORY:  (History from previous dates can be found in the separate document.)    Current Outpatient Medications on File Prior to Visit   Medication Sig Dispense Refill   • acetaminophen (TYLENOL) 500 MG tablet Take 500 mg by mouth Every 6 (Six) Hours As Needed for Mild Pain .     • calcium carbonate (OS-CARY) 600 MG tablet Take 600 mg  by mouth 2 (Two) Times a Day With Meals.     • capecitabine (XELODA) 500 MG chemo tablet Take 3 tabs (1500 mg) by mouth twice a day for 7 days on then 7 days off. 84 tablet 3   • cholecalciferol (VITAMIN D3) 1000 units tablet Take 1,000 Units by mouth Daily.     • diphenoxylate-atropine (LOMOTIL) 2.5-0.025 MG per tablet Take 1 tablet by mouth 4 (Four) Times a Day As Needed for Diarrhea. 60 tablet 0   • DULoxetine (Cymbalta) 30 MG capsule Take 1 capsule by mouth 2 (Two) Times a Day. 180 capsule 0   • FLONASE ALLERGY RELIEF 50 MCG/ACT nasal spray 2 sprays into each nostril daily.  11   • gabapentin (NEURONTIN) 300 MG capsule TAKE 1 CAPSULE BY MOUTH  TWICE DAILY 180 capsule 3   • ketoconazole (NIZORAL) 2 % cream Apply  topically to the appropriate area as directed Daily. 15 g 0   • losartan (COZAAR) 50 MG tablet Take 1 tablet by mouth Daily. 90 tablet 2   • mesalamine (LIALDA) 1.2 g EC tablet TAKE 1 TABLET BY MOUTH  TWICE DAILY 180 tablet 3   • metaxalone (Skelaxin) 800 MG tablet Take 1 tablet by mouth 3 (Three) Times a Day As Needed for Muscle Spasms. 30 tablet 3   • mupirocin (BACTROBAN) 2 % ointment JUANITA TO SURGICAL SITE AND COVER WITH BAND AID D UNTIL HEALED     • omeprazole (PriLOSEC) 40 MG capsule TAKE 1 CAPSULE DAILY 90 capsule 2   • ondansetron ODT (ZOFRAN-ODT) 8 MG disintegrating tablet Take 1 tablet by mouth Every 8 (Eight) Hours As Needed for Nausea or Vomiting. 30 tablet 1   • phenazopyridine (PYRIDIUM) 200 MG tablet Take 200 mg by mouth 3 (Three) Times a Day As Needed for bladder spasms.     • prochlorperazine (COMPAZINE) 10 MG tablet Take 1 tablet by mouth Every 6 (Six) Hours As Needed for Nausea or Vomiting. 30 tablet 0   • sennosides-docusate sodium (SENOKOT-S) 8.6-50 MG tablet Take 2 tablets by mouth 2 (Two) Times a Day. 90 tablet 2   • sucralfate (CARAFATE) 1 g tablet Take 1 tablet by mouth 4 (Four) Times a Day. 120 tablet 2   • temazepam (RESTORIL) 15 MG capsule Take 1 capsule by mouth At Night As  Needed for Sleep. 90 capsule 1   • traMADol (ULTRAM) 50 MG tablet Take 1 tablet by mouth Every 8 (Eight) Hours As Needed for Moderate Pain . 90 tablet 1   • triamcinolone (KENALOG) 0.1 % cream Apply  topically to the appropriate area as directed 2 (Two) Times a Day. 15 g 0   • trimethoprim (TRIMPEX) 100 MG tablet Take 100 mg by mouth Daily.     • XIIDRA 5 % ophthalmic solution INSTILL 1 DROP INTO EACH EYE BID.     • [DISCONTINUED] ELIQUIS 5 MG tablet tablet TAKE 1 TABLET BY MOUTH EVERY 12 HOURS 60 tablet 6     No current facility-administered medications on file prior to visit.        ALLERGIES:     Allergies   Allergen Reactions   • Hydrocodone Nausea Only   • Morphine And Related Nausea And Vomiting     Social History     Socioeconomic History   • Marital status:      Spouse name: Murray   • Number of children: 3   • Years of education: College   • Highest education level: Not on file   Occupational History     Employer: [employer]     Employer: RETIRED   Tobacco Use   • Smoking status: Never Smoker   • Smokeless tobacco: Never Used   Substance and Sexual Activity   • Alcohol use: Yes     Comment: social   • Drug use: No   • Sexual activity: Defer     Family History   Problem Relation Age of Onset   • Heart disease Mother    • Hyperlipidemia Mother    • Hypertension Mother    • Diabetes Father    • Charcot-Saloni-Tooth disease Father    • Heart disease Father    • Hypertension Father    • Heart failure Father    • Diabetes Sister    • Heart disease Sister    • Hypertension Sister    • Heart disease Maternal Aunt    • Scoliosis Maternal Aunt    • Heart disease Maternal Uncle    • Diabetes Maternal Grandmother    • Heart disease Maternal Grandmother    • Hypertension Maternal Grandmother    • Uterine cancer Maternal Grandmother    • Heart disease Maternal Grandfather      Review of Systems   Constitutional: Positive for fatigue and unexpected weight change. Negative for activity change and fever.   HENT:  "Negative for congestion, mouth sores, nosebleeds, sore throat and voice change.    Eyes: Negative for discharge, itching and visual disturbance.   Respiratory: Negative for cough, shortness of breath and wheezing.    Cardiovascular: Negative for chest pain, palpitations and leg swelling.   Gastrointestinal: Negative for abdominal distention, abdominal pain, blood in stool, constipation, diarrhea, nausea and vomiting.   Endocrine: Negative for cold intolerance and heat intolerance.   Genitourinary: Negative for difficulty urinating, dysuria, flank pain, frequency, hematuria and urgency.   Musculoskeletal: Negative for arthralgias, back pain, joint swelling and myalgias.   Skin: Positive for rash and wound.   Neurological: Negative for dizziness, syncope, weakness, light-headedness, numbness and headaches.   Hematological: Negative for adenopathy. Does not bruise/bleed easily.   Psychiatric/Behavioral: Positive for sleep disturbance. Negative for confusion. The patient is not nervous/anxious.    Review of systems was obtained 01/04/21 unchanged from previous      Objective    Vitals:    01/04/21 0958   BP: 139/84   Pulse: 103   Resp: 20   Temp: 97.8 °F (36.6 °C)   TempSrc: Temporal   SpO2: 96%   Weight: 83.1 kg (183 lb 3.2 oz)   Height: 157.5 cm (62.01\")   PainSc:   4   PainLoc: Back        Current Status 1/4/2021   ECOG score 1     Physical Exam   Constitutional: She is oriented to person, place, and time. She appears well-developed.   Eyes: Conjunctivae are normal.   Neck: No thyromegaly present.   Cardiovascular: Normal rate and regular rhythm. Exam reveals no gallop and no friction rub.   No murmur heard.  Pulmonary/Chest: Breath sounds normal. No respiratory distress.   Abdominal: Soft. Bowel sounds are normal. She exhibits no distension. There is no abdominal tenderness.   Musculoskeletal:      Comments: Lower extremity braces in place.  No significant lower extremity edema   Lymphadenopathy:        Head (right " side): No submandibular adenopathy present.     She has no cervical adenopathy. No inguinal adenopathy noted on the right or left side.        Right: No supraclavicular adenopathy present.        Left: No supraclavicular adenopathy present.   Neurological: She is alert and oriented to person, place, and time. She displays normal reflexes. No cranial nerve deficit. She exhibits normal muscle tone.   Bilateral lower extremity strength, 4+/5   Skin: Skin is warm and dry. Rash noted.   Mild erythema involving the palms of the hands.  Slight skin cracking in the hands.  There is an area of slight excoriation in the inferior portion of the left great toe   Psychiatric: Her behavior is normal.       RECENT LABS:  Results from last 7 days   Lab Units 01/04/21  0945   WBC 10*3/mm3 2.85*   NEUTROS ABS 10*3/mm3 1.57*   HEMOGLOBIN g/dL 13.1   HEMATOCRIT % 40.2   PLATELETS 10*3/mm3 220     Results from last 7 days   Lab Units 01/04/21  0945   SODIUM mmol/L 142   POTASSIUM mmol/L 4.3   CHLORIDE mmol/L 104   CO2 mmol/L 32.2*   BUN mg/dL 20   CREATININE mg/dL 0.90   CALCIUM mg/dL 9.4   ALBUMIN g/dL 4.30   BILIRUBIN mg/dL 0.6   ALK PHOS U/L 51   ALT (SGPT) U/L 19   AST (SGOT) U/L 25   GLUCOSE mg/dL 158*   MAGNESIUM mg/dL 1.9             Assessment/Plan      1. Previous Stage Ib ([id number]) right breast cancer:  · Diagnosed May 2010 with excisional biopsy for invasive ductal carcinoma, 1.3 cm, grade 2, ER 90%, PA 80%, HER-2/peter negative (1+ IHC).    · Subsequent right mastectomy in July 2010 with no residual breast malignancy, 1/5 sentinel lymph node with micrometastasis (0.25 mm).    · Treated in the [facility] system with adjuvant AC ×4 cycles in 2010 (no taxanes administered due to underlying Charcot-Saloni-Tooth with peripheral neuropathy).    · Adjuvant Femara (postmenopausal) initiated October 2010 with plan to treat ×10 years.    · Genetic testing reportedly negative.    · Developed osteopenia treated with Prolia beginning  2/27/13. Subsequently discontinued due to identification of metastatic disease.  2. Recurrent/metastatic disease identified 10/8/17:  · Disease involving thoracic spine with cord compression at T6, lumbosacral involvement, sternal and right sternoclavicular involvement.    · Femara discontinued in 10/2017.    · Radiation administered (in the [facility] system) to the thoracic spine beginning 10/19/17 treating T3-T9 to a dose of 24 gray in 6 fractions.  · Evaluation with MRI 12/8/17 showing persistent T6 cord compression with persistent neurologic compromise requiring surgical treatment 12/11/17 with T6 laminectomy/corpectomy and T3-T9 fusion.  Pathology with metastatic carcinoma of breast origin, ER negative, OR negative, HER-2/peter negative (1+ IHC).  · Additional staging evaluation 12/8/17 with no evidence of visceral metastatic disease, bone scan showing involvement of thoracic spine, sternum, left humerus, mid frontal bone.  No plane film correlate of left humerus lesion.  MRI lumbar spine with small intradural L3 metastasis.  CA 15-3 12/6/17- 17.  · Palliative radiation therapy to L3 dural metastasis and left humerus initiated 1/15/18 (10 fractions), completed 1/26/18.  · Hypercalcemia of malignancy with calcium in the 10-11 range.  · Initiation of monthly Xgeva 1/23/18.  · Baseline CT scan 1/30/18 with no evidence of visceral involvement.  Cluster of nodular opacities in the right lower lobe suspected to be infectious or related to bronchiolitis. Bone scan 1/30/18 showed postsurgical change in the thoracic spine, stable uptake in the frontal bone, no new areas of disease.  · Initiation of palliative oral single agent Xeloda 2/7/18 2000 mg a.m., 1500 mg p.m. for 14/21 days.   · Following 3 cycles xeloda, bone scan 4/4/18 showed no change from the prior study.  CT scan 4/4/18 showed a small pericardial effusion of unclear significance as well as a subcutaneous nodule in the right lateral chest wall.  Subsequent  evaluation with echocardiogram 4/17/18 showed no evidence of pericardial effusion.  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  We discussed the possibility of surgical excision of the right subcutaneous chest wall lesion for more definitive diagnosis.  Reviewed previous CT images dating back to 12/8/17 and the lesion was present even at that time measuring around 1.7 cm although not commented on in the radiology report.  As this appears to be an indolent low-grade process unrelated to her breast cancer, recommendee foregoing surgical excision at this time and monitoring this area on future scans.  The patient agreed.    · Following 6 cycles of Xeloda, CT 6/6/18  showed stable findings, no evidence of progressive disease.  There was a comment regarding subcutaneous abnormality in the anterior abdominal wall and this was related to Lovenox injection sites.  Bone scan 6/6/18 showed no interval change.   · CT scan and bone scan 8/13/18 following 9 cycles of Xeloda showed stable findings with no evidence of significant visceral metastases.  Her bone lesions appear stable on bone scan.  The spindle cell neoplasm in her right chest wall actually decreased in size from 2 cm down to 1.6 cm.    · The patient experienced some symptoms of diarrhea, anorexia, generalized weakness during cycle 9 Xeloda it was unclear whether this was related to a viral gastroenteritis or toxicity from treatment.  Symptoms recurred during cycle 10 and treatment was cut short by 2 days.  Symptoms attributed to Xeloda.  With cycle 11, dose and schedule altered to 1500 mg twice daily for 7 days on followed by 7 days off .  · CT scan 9/9/2020 with no significant changes.  Bone scan 9/9/2020 read as unchanged from prior studies however did note an area of slight activity in the medial left femur.  Contacted  radiology and although this was not noted on prior reports appears to have been present.  Subsequent MRI left femur 9/21/2020 with cortical thickening and periosteal edema left iliac us muscle insertion to the medial left femur with no evidence of metastatic disease, favored to represent periosteal change secondary to insertional tendinitis.  · 3-month interval scans from 11/30/2020 with CT chest abdomen pelvis and bone scan showing stable findings.  · Patient reviewed today, 1/4/2021, continuing on Xeloda 1500 mg twice daily for 7 days on, 7 days off along with monthly Xgeva.  Overall is tolerating therapy well.  She has grade 1-2 erythroderma of the hands and feet which is stable.  Her WBC/ANC is slightly lower than normal today.  After review with Dr. Hancock we will ask her to come back in 2 weeks for CBC with RN review to keep an eye on this.  She is also been educated to call with any fever or infectious symptoms.  She will otherwise return in 1 month for follow-up with Dr. Hancock and continue monthly Xgeva.  We do plan repeat scans at 3-month interval.  3. Right pulmonary embolism:  · Diagnosed on CT angiogram 10/21/17 involving small right lower lobe pulmonary artery.  Lower extremity Dopplers negative.  · Bilateral lower extremity Dopplers negative again 12/5/17.  · Received chronic Lovenox 1 mg/kg twice per day, transition to oral Eliquis in February 2019, continuing on Eliquis 5 mg twice daily.  · Patient has no bleeding issues on anticoagulation.  4. Cancer related pain:  · Previously receiving Duragesic 50 µg patch every 72 hours along with Dilaudid 4 mg as needed for breakthrough pain  · The patient's pain improved over time and she was able to discontinue both Duragesic and Dilaudid in the interval.  · Patient does take occasional Flexeril at bedtime due to back spasm/pain when she has been more active.  · The patient does have some occasional aggravation of her chronic back pain.    · Currently  stable.  5. Chemotherapy-induced diarrhea with subsequent C. difficile colitis in the setting of previous ulcerative colitis:  · Patient with reported history of ulcerative colitis, is not on active therapy.  · The patient developed initial diarrhea related to Xeloda at regular dosing.  · Symptoms improved on reduced dose Xeloda  · Flare of symptoms in October 2018 with apparent finding of C. difficile colitis by GI, treated with course of oral vancomycin with improvement in symptoms.  · Patient notes minimal intermittent diarrhea on Xeloda requiring occasional dosing of Imodium.  This is unchanged.  6. Traumatic left tibia/fibular fracture:  · Status post ORIF 12/6/17  · Specimen was sent for pathologic review, negative for evidence of malignancy  7. Hypercalcemia:  · Suspect hypercalcemia of malignancy, calcium in  10-11 range previously.  · Calcium normalized following initiation of monthly Xgeva on 1/23/18.  8. Chemotherapy-induced mucositis:  · Patient had a minimal degree of mucositis with cycle 2.  The patient has magic mouthwash to use as needed.  No subsequent mucositis.  9. Recurrent UTI, bladder wall thickening on CT:  · Patient had an enterococcal UTI on 3/2/18 sensitive to nitrofurantoin and received treatment, unclear how long.  · Recurrent UTI 3/20/18 with urine culture growing Klebsiella, initially treated with nitrofurantoin, transitioned to Levaquin.  · CT 4/4/18 with diffuse bladder wall thickening with increased nodular thickening at the left base.  Referral to urogynecology Dr. May Johnson.  She was placed on a prophylactic dose of trimethoprim 100 mg daily, bladder wall thickening felt to be related to recent recurrent urinary tract infections.  · Patient with urinary symptoms, treated with course of Macrobid at the end of December 2018, urine culture however was negative 12/31/18.  · Patient was found to have Klebsiella UTI 7/29/2019 which was successfully treated with Macrobid with complete  resolution of symptoms.  10.   Mobility:  · The patient underwent an intensive course of [facility] at [facility].  She graduated from her outpatient course November 2018.  · Overall the patient has improved dramatically in terms of mobility, now walking with leg braces and a walking stick.   11. 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  Symptoms did improve and she continued on the same dose.    · Per review today, symptoms are stable with her palms and bottoms of her feet with healing area of excoriation on the left foot previously noted.   12. Evidence of steatosis on scans with mild intermittent elevated liver function studies:  · Liver function studies increased 8/20/19 with ALT 98, AST 70, normal total bilirubin.  · Negative viral hepatitis A, B, and C panel 8/23/18  · Likely related to hepatic steatosis.   · Subsequent improvement in LFTs  · Today, LFTs are normal.    13. Chemotherapy induced leukopenia:  · WBC today 2.8, ANC 1.57.  Plan to recheck in 2 weeks.   14. GERD:  · The patient continues on omeprazole 40 mg daily (2 x 20 mg).  · The patient was also prescribed Carafate 1 g 4 times daily  · Symptoms have improved recently.  15. Insomnia:  · Patient with prior paradoxical reaction to Benadryl  · Improved previously on temazepam 15 mg nightly as needed.   · Patient noted subsequently that temazepam was having no effect.    · Patient is currently taking gabapentin 300 mg in the evening and 300 mg at night which has helped.  16. Health maintenance:  · Patient notes that she has a history of colon polyps as well as ulcerative colitis and was due for a follow-up colonoscopy on 9/12/2019.  We did discuss there is no necessity to pursue colonoscopy in the setting of her metastatic breast cancer.    · The patient did undergo  colonoscopy on 2/7/2020 with findings of muscular hypertrophy and diverticulosis.   17. Right shoulder pain:  · Patient was evaluated by Dr Forrester.  She has experienced difficulty over the past year and a half with her right shoulder although she has not complained of this in prior visits to our office.  She reports difficulty with abduction.    · The patient did undergo MRI of her right shoulder on 11/21/2019 at an outside facility showing multiple abnormalities including supraspinatus tendinosis, labral tear but no evidence of metastatic disease.  · Symptoms subsequently resolved.  18. Ocular changes in part related to Xeloda:  · Patient experienced a mild degree of blurred vision as well as burning and pruritus.  · She was seen by her ophthalmologist and has been continuing on xiidra ophthalmic drops which have helped.  · Likely both issues are to some extent related to Xeloda.  · Symptoms currently stable to improved.  19. Anxiety/depression:  · Patient was seen in the supportive oncology clinic on 12/2/2020 was started on Cymbalta 30 mg twice daily.  She is also continuing currently on gabapentin 300 mg at dinner and 300 mg at night.  · Patient reports that her symptoms have improved.  She does have follow-up scheduled in supportive oncology clinic on 12/22/2020.    Plan:  1. Continue oral Xeloda 1500 mg twice a day 7 days on followed by 7 days off.  2. Monthly Xgeva today  3. Continue Eliquis 5 mg twice daily.  Refill sent in today per request.  4. Continue use of emollient cream on the hands and feet and continue to wear socks and gloves at night  5. Continue omeprazole 40 mg daily and Carafate 1 g 4 times daily.    6. Continue xiidra ophthalmic drops prescribed by ophthalmology  7. In 4 weeks MD visit with CBC, CMP, magnesium, phosphorus and Xgeva  8. In 7 weeks CT chest abdomen pelvis and bone scan  9. In 8 weeks MD visit with CBC, CMP, magnesium, phosphorus and Xgeva.  We will review scan  results.    Patient continuing on high risk medication requiring intensive monitoring.

## 2021-01-05 ENCOUNTER — MEDICATION THERAPY MANAGEMENT (OUTPATIENT)
Dept: PHARMACY | Facility: HOSPITAL | Age: 61
End: 2021-01-05

## 2021-01-05 NOTE — PROGRESS NOTES
[facility] Lab Review- Capecitabine      1/4/2021   WBC 3.40 - 10.80 10*3/mm3 2.85 (A)   Neutrophils Absolute 1.70 - 7.00 10*3/mm3 1.57 (A)   Hemoglobin 12.0 - 15.9 g/dL 13.1   Hematocrit 34.0 - 46.6 % 40.2   Platelets 140 - 450 10*3/mm3 220   Creatinine 0.57 - 1.00 mg/dL 0.90   eGFR Non African Am >60 mL/min/1.73 64   BUN 8 - 23 mg/dL 20   Sodium 136 - 145 mmol/L 142   Potassium 3.5 - 5.2 mmol/L 4.3   Glucose 65 - 99 mg/dL 158 (A)   Magnesium 1.6 - 2.4 mg/dL 1.9   Calcium 8.6 - 10.5 mg/dL 9.4   Albumin 3.50 - 5.20 g/dL 4.30   Total Protein 6.0 - 8.5 g/dL 6.6   AST (SGOT) 1 - 32 U/L 25   ALT (SGPT) 1 - 33 U/L 19   Alkaline Phosphatase 39 - 117 U/L 51   Total Bilirubin 0.0 - 1.2 mg/dL 0.6   Phosphorus 2.5 - 4.5 mg/dL 3.1     APRN dictation noted. Pharmacy will continue to follow.     Thanks,   Lubna Gupta, PharmD

## 2021-01-12 ENCOUNTER — MEDICATION THERAPY MANAGEMENT (OUTPATIENT)
Dept: PHARMACY | Facility: HOSPITAL | Age: 61
End: 2021-01-12

## 2021-01-12 NOTE — PROGRESS NOTES
MTM telephone follow up- Capecitabine    No answer today.  direct line [phone].     Thanks,   Lubna Gupta, DimitriosD

## 2021-01-18 ENCOUNTER — TELEPHONE (OUTPATIENT)
Dept: ONCOLOGY | Facility: CLINIC | Age: 61
End: 2021-01-18

## 2021-01-18 ENCOUNTER — APPOINTMENT (OUTPATIENT)
Dept: OTHER | Facility: HOSPITAL | Age: 61
End: 2021-01-18

## 2021-01-18 ENCOUNTER — APPOINTMENT (OUTPATIENT)
Dept: ONCOLOGY | Facility: HOSPITAL | Age: 61
End: 2021-01-18

## 2021-01-18 NOTE — TELEPHONE ENCOUNTER
Martha called to cancel her lab and RN review appts from today 01/18, she did not want to get out in the bad weather. She did not want to reschedule at this time.     Ph#[phone]

## 2021-01-19 ENCOUNTER — MEDICATION THERAPY MANAGEMENT (OUTPATIENT)
Dept: PHARMACY | Facility: HOSPITAL | Age: 61
End: 2021-01-19

## 2021-01-19 NOTE — PROGRESS NOTES
MTM telephone follow up- Capecitabine    No answer today.  direct line [phone].     Thanks,   Lubna Gupta, DimitriosD

## 2021-01-20 ENCOUNTER — MEDICATION THERAPY MANAGEMENT (OUTPATIENT)
Dept: PHARMACY | Facility: HOSPITAL | Age: 61
End: 2021-01-20

## 2021-01-20 NOTE — PROGRESS NOTES
MTM telephone follow up- Capecitabine    Martha is doing well today. She has no new issues or side effects with her capecitabine. She continues to have some HFS, but she is able to manage this. Medication administration and adherence seem appropriate; she admits to missing about one dose this past month. She denies any other medication changes. Pharmacy will continue to follow.     Thanks,   Lubna Gupta, PharmD

## 2021-01-28 ENCOUNTER — OFFICE VISIT (OUTPATIENT)
Dept: PSYCHIATRY | Facility: HOSPITAL | Age: 61
End: 2021-01-28

## 2021-01-28 DIAGNOSIS — F33.1 MAJOR DEPRESSIVE DISORDER, RECURRENT EPISODE, MODERATE (HCC): Primary | ICD-10-CM

## 2021-01-28 DIAGNOSIS — F41.1 GENERALIZED ANXIETY DISORDER: ICD-10-CM

## 2021-01-28 PROCEDURE — 99215 OFFICE O/P EST HI 40 MIN: CPT | Performed by: NURSE PRACTITIONER

## 2021-02-01 ENCOUNTER — INFUSION (OUTPATIENT)
Dept: ONCOLOGY | Facility: HOSPITAL | Age: 61
End: 2021-02-01

## 2021-02-01 ENCOUNTER — OFFICE VISIT (OUTPATIENT)
Dept: ONCOLOGY | Facility: CLINIC | Age: 61
End: 2021-02-01

## 2021-02-01 ENCOUNTER — LAB (OUTPATIENT)
Dept: OTHER | Facility: HOSPITAL | Age: 61
End: 2021-02-01

## 2021-02-01 VITALS
RESPIRATION RATE: 18 BRPM | SYSTOLIC BLOOD PRESSURE: 129 MMHG | WEIGHT: 183.3 LBS | DIASTOLIC BLOOD PRESSURE: 75 MMHG | TEMPERATURE: 97.3 F | BODY MASS INDEX: 33.73 KG/M2 | HEART RATE: 99 BPM | HEIGHT: 62 IN | OXYGEN SATURATION: 97 %

## 2021-02-01 DIAGNOSIS — Z17.1 MALIGNANT NEOPLASM OF OVERLAPPING SITES OF RIGHT BREAST IN FEMALE, ESTROGEN RECEPTOR NEGATIVE (HCC): Primary | ICD-10-CM

## 2021-02-01 DIAGNOSIS — C50.811 MALIGNANT NEOPLASM OF OVERLAPPING SITES OF RIGHT BREAST IN FEMALE, ESTROGEN RECEPTOR NEGATIVE (HCC): Primary | ICD-10-CM

## 2021-02-01 DIAGNOSIS — L27.1 HAND FOOT SYNDROME: ICD-10-CM

## 2021-02-01 DIAGNOSIS — C50.811 MALIGNANT NEOPLASM OF OVERLAPPING SITES OF RIGHT BREAST IN FEMALE, ESTROGEN RECEPTOR NEGATIVE (HCC): ICD-10-CM

## 2021-02-01 DIAGNOSIS — Z17.1 MALIGNANT NEOPLASM OF OVERLAPPING SITES OF RIGHT BREAST IN FEMALE, ESTROGEN RECEPTOR NEGATIVE (HCC): ICD-10-CM

## 2021-02-01 LAB
ALBUMIN SERPL-MCNC: 4.4 G/DL (ref 3.5–5.2)
ALBUMIN/GLOB SERPL: 1.8 G/DL
ALP SERPL-CCNC: 57 U/L (ref 39–117)
ALT SERPL W P-5'-P-CCNC: 26 U/L (ref 1–33)
ANION GAP SERPL CALCULATED.3IONS-SCNC: 4.3 MMOL/L (ref 5–15)
AST SERPL-CCNC: 29 U/L (ref 1–32)
BASOPHILS # BLD AUTO: 0.03 10*3/MM3 (ref 0–0.2)
BASOPHILS NFR BLD AUTO: 0.8 % (ref 0–1.5)
BILIRUB SERPL-MCNC: 0.6 MG/DL (ref 0–1.2)
BUN SERPL-MCNC: 20 MG/DL (ref 8–23)
BUN/CREAT SERPL: 21.7 (ref 7–25)
CALCIUM SPEC-SCNC: 9.6 MG/DL (ref 8.6–10.5)
CHLORIDE SERPL-SCNC: 102 MMOL/L (ref 98–107)
CO2 SERPL-SCNC: 33.7 MMOL/L (ref 22–29)
CREAT SERPL-MCNC: 0.92 MG/DL (ref 0.57–1)
DEPRECATED RDW RBC AUTO: 63.1 FL (ref 37–54)
EOSINOPHIL # BLD AUTO: 0.13 10*3/MM3 (ref 0–0.4)
EOSINOPHIL NFR BLD AUTO: 3.6 % (ref 0.3–6.2)
ERYTHROCYTE [DISTWIDTH] IN BLOOD BY AUTOMATED COUNT: 17.3 % (ref 12.3–15.4)
GFR SERPL CREATININE-BSD FRML MDRD: 62 ML/MIN/1.73
GLOBULIN UR ELPH-MCNC: 2.4 GM/DL
GLUCOSE SERPL-MCNC: 126 MG/DL (ref 65–99)
HCT VFR BLD AUTO: 40.2 % (ref 34–46.6)
HGB BLD-MCNC: 13 G/DL (ref 12–15.9)
IMM GRANULOCYTES # BLD AUTO: 0.01 10*3/MM3 (ref 0–0.05)
IMM GRANULOCYTES NFR BLD AUTO: 0.3 % (ref 0–0.5)
LYMPHOCYTES # BLD AUTO: 0.93 10*3/MM3 (ref 0.7–3.1)
LYMPHOCYTES NFR BLD AUTO: 25.6 % (ref 19.6–45.3)
MAGNESIUM SERPL-MCNC: 1.8 MG/DL (ref 1.6–2.4)
MCH RBC QN AUTO: 32 PG (ref 26.6–33)
MCHC RBC AUTO-ENTMCNC: 32.3 G/DL (ref 31.5–35.7)
MCV RBC AUTO: 99 FL (ref 79–97)
MONOCYTES # BLD AUTO: 0.29 10*3/MM3 (ref 0.1–0.9)
MONOCYTES NFR BLD AUTO: 8 % (ref 5–12)
NEUTROPHILS NFR BLD AUTO: 2.24 10*3/MM3 (ref 1.7–7)
NEUTROPHILS NFR BLD AUTO: 61.7 % (ref 42.7–76)
NRBC BLD AUTO-RTO: 0 /100 WBC (ref 0–0.2)
PHOSPHATE SERPL-MCNC: 3.4 MG/DL (ref 2.5–4.5)
PLATELET # BLD AUTO: 214 10*3/MM3 (ref 140–450)
PMV BLD AUTO: 10.7 FL (ref 6–12)
POTASSIUM SERPL-SCNC: 4.5 MMOL/L (ref 3.5–5.2)
PROT SERPL-MCNC: 6.8 G/DL (ref 6–8.5)
RBC # BLD AUTO: 4.06 10*6/MM3 (ref 3.77–5.28)
SODIUM SERPL-SCNC: 140 MMOL/L (ref 136–145)
WBC # BLD AUTO: 3.63 10*3/MM3 (ref 3.4–10.8)

## 2021-02-01 PROCEDURE — 83735 ASSAY OF MAGNESIUM: CPT | Performed by: INTERNAL MEDICINE

## 2021-02-01 PROCEDURE — 25010000002 DENOSUMAB 120 MG/1.7ML SOLUTION: Performed by: INTERNAL MEDICINE

## 2021-02-01 PROCEDURE — 99215 OFFICE O/P EST HI 40 MIN: CPT | Performed by: INTERNAL MEDICINE

## 2021-02-01 PROCEDURE — 96372 THER/PROPH/DIAG INJ SC/IM: CPT

## 2021-02-01 PROCEDURE — 80053 COMPREHEN METABOLIC PANEL: CPT | Performed by: INTERNAL MEDICINE

## 2021-02-01 PROCEDURE — 85025 COMPLETE CBC W/AUTO DIFF WBC: CPT | Performed by: INTERNAL MEDICINE

## 2021-02-01 PROCEDURE — 84100 ASSAY OF PHOSPHORUS: CPT | Performed by: INTERNAL MEDICINE

## 2021-02-01 PROCEDURE — 36415 COLL VENOUS BLD VENIPUNCTURE: CPT

## 2021-02-01 RX ORDER — TRAMADOL HYDROCHLORIDE 50 MG/1
50 TABLET ORAL EVERY 8 HOURS PRN
Qty: 90 TABLET | Refills: 0 | Status: SHIPPED | OUTPATIENT
Start: 2021-02-01 | End: 2021-03-05

## 2021-02-01 RX ADMIN — DENOSUMAB 120 MG: 120 INJECTION SUBCUTANEOUS at 15:09

## 2021-02-01 NOTE — PROGRESS NOTES
Chief Complaint  Previous Stage Ib ([id number]) ER/AL positive, HER-2/peter negative right breast cancer with subsequent metastatic disease identified 10/8/2017, history of right pulmonary embolism, cancer related pain, chemotherapy-induced diarrhea, chemotherapy-induced mucositis, chemotherapy-induced hand-foot syndrome    Subjective        History of Present Illness  The patient returns today in follow-up continuing on oral Xeloda 1500 mg twice daily for 7 days on followed by 7 days off as well as monthly Xgeva and Eliquis 5 mg twice daily.  The patient has continued to tolerate Xeloda reasonably well although does have difficulties with some chronic side effects, mainly related to hand-foot syndrome.  Symptoms are worse in her right hand more so than her left.  She does use frequent application of emollient creams and does wear socks and gloves at night.  She reports developing some increasing stiffness in her fingers with some apparent degree of sclerodactyly and is requesting a referral to physical therapy.  The degree of erythema has increased slightly on the right and has extended into the dorsal aspect of the hand.  She does not report any current diarrhea although she has had intermittent diarrhea in the past.  She has not had any recent mucositis symptoms.  She reports that her bony pain is under fair control usually responds to tramadol and she is requesting a refill prescription today.  She reports some intermittent pain around her left hip area with increased activity and feels that this is related to tendinitis as she had experienced in the past.  Periods of increased activity do aggravate the pain and it is relieved with rest.  Patient does continue with a stable appetite, weight stable at 183 pounds.  She does continue routine follow-up in the supportive oncology clinic, last seen on 1/28/2021 and is continuing on Cymbalta 30 mg twice daily and gabapentin 300 mg twice daily.  She reports that her  "symptoms are under fair control.      Objective   Vital Signs:   /75   Pulse 99   Temp 97.3 °F (36.3 °C) (Temporal)   Resp 18   Ht 157.5 cm (62.01\")   Wt 83.1 kg (183 lb 4.8 oz)   SpO2 97%   BMI 33.52 kg/m²     Physical Exam  Constitutional:       Appearance: She is well-developed.   Eyes:      Conjunctiva/sclera: Conjunctivae normal.   Neck:      Thyroid: No thyromegaly.   Cardiovascular:      Rate and Rhythm: Normal rate and regular rhythm.      Heart sounds: No murmur. No friction rub. No gallop.    Pulmonary:      Effort: No respiratory distress.      Breath sounds: Normal breath sounds.   Abdominal:      General: Bowel sounds are normal. There is no distension.      Palpations: Abdomen is soft.      Tenderness: There is no abdominal tenderness.   Lymphadenopathy:      Head:      Right side of head: No submandibular adenopathy.      Cervical: No cervical adenopathy.      Upper Body:      Right upper body: No supraclavicular adenopathy.      Left upper body: No supraclavicular adenopathy.   Skin:     General: Skin is warm and dry.      Findings: Rash present.      Comments: There is significant bilateral palmar erythema, on the right hand there is extension onto the dorsal aspect of the hand, slightly increased from prior exam.   Neurological:      Mental Status: She is alert and oriented to person, place, and time.      Cranial Nerves: No cranial nerve deficit.      Motor: No abnormal muscle tone.      Deep Tendon Reflexes: Reflexes normal.   Psychiatric:         Behavior: Behavior normal.        Result Review : Reviewed CBC, CMP, magnesium, phosphorus today       Assessment and Plan     1. Previous Stage Ib ([id number]) right breast cancer:  · Diagnosed May 2010 with excisional biopsy for invasive ductal carcinoma, 1.3 cm, grade 2, ER 90%, MD 80%, HER-2/peter negative (1+ IHC).    · Subsequent right mastectomy in July 2010 with no residual breast malignancy, 1/5 sentinel lymph node with " micrometastasis (0.25 mm).    · Treated in the [facility] system with adjuvant AC ×4 cycles in 2010 (no taxanes administered due to underlying Charcot-Saloni-Tooth with peripheral neuropathy).    · Adjuvant Femara (postmenopausal) initiated October 2010 with plan to treat ×10 years.    · Genetic testing reportedly negative.    · Developed osteopenia treated with Prolia beginning 2/27/13. Subsequently discontinued due to identification of metastatic disease.  2. Recurrent/metastatic disease identified 10/8/17:  · Disease involving thoracic spine with cord compression at T6, lumbosacral involvement, sternal and right sternoclavicular involvement.    · Femara discontinued in 10/2017.    · Radiation administered (in the [facility] system) to the thoracic spine beginning 10/19/17 treating T3-T9 to a dose of 24 gray in 6 fractions.  · Evaluation with MRI 12/8/17 showing persistent T6 cord compression with persistent neurologic compromise requiring surgical treatment 12/11/17 with T6 laminectomy/corpectomy and T3-T9 fusion.  Pathology with metastatic carcinoma of breast origin, ER negative, IA negative, HER-2/peter negative (1+ IHC).  · Additional staging evaluation 12/8/17 with no evidence of visceral metastatic disease, bone scan showing involvement of thoracic spine, sternum, left humerus, mid frontal bone.  No plane film correlate of left humerus lesion.  MRI lumbar spine with small intradural L3 metastasis.  CA 15-3 12/6/17- 17.  · Palliative radiation therapy to L3 dural metastasis and left humerus initiated 1/15/18 (10 fractions), completed 1/26/18.  · Hypercalcemia of malignancy with calcium in the 10-11 range.  · Initiation of monthly Xgeva 1/23/18.  · Baseline CT scan 1/30/18 with no evidence of visceral involvement.  Cluster of nodular opacities in the right lower lobe suspected to be infectious or related to bronchiolitis. Bone scan 1/30/18 showed postsurgical change in the thoracic spine, stable uptake in the frontal  bone, no new areas of disease.  · Initiation of palliative oral single agent Xeloda 2/7/18 2000 mg a.m., 1500 mg p.m. for 14/21 days.   · Following 3 cycles xeloda, bone scan 4/4/18 showed no change from the prior study.  CT scan 4/4/18 showed a small pericardial effusion of unclear significance as well as a subcutaneous nodule in the right lateral chest wall.  Subsequent evaluation with echocardiogram 4/17/18 showed no evidence of pericardial effusion.  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  We discussed the possibility of surgical excision of the right subcutaneous chest wall lesion for more definitive diagnosis.  Reviewed previous CT images dating back to 12/8/17 and the lesion was present even at that time measuring around 1.7 cm although not commented on in the radiology report.  As this appears to be an indolent low-grade process unrelated to her breast cancer, recommendee foregoing surgical excision at this time and monitoring this area on future scans.  The patient agreed.    · Following 6 cycles of Xeloda, CT 6/6/18  showed stable findings, no evidence of progressive disease.  There was a comment regarding subcutaneous abnormality in the anterior abdominal wall and this was related to Lovenox injection sites.  Bone scan 6/6/18 showed no interval change.   · CT scan and bone scan 8/13/18 following 9 cycles of Xeloda showed stable findings with no evidence of significant visceral metastases.  Her bone lesions appear stable on bone scan.  The spindle cell neoplasm in her right chest wall actually decreased in size from 2 cm down to 1.6 cm.    · The patient experienced some symptoms of diarrhea, anorexia, generalized weakness during cycle 9 Xeloda it was unclear whether this was related to a viral gastroenteritis or toxicity from treatment.  Symptoms recurred  during cycle 10 and treatment was cut short by 2 days.  Symptoms attributed to Xeloda.  With cycle 11, dose and schedule altered to 1500 mg twice daily for 7 days on followed by 7 days off .  · CT scan 9/9/2020 with no significant changes.  Bone scan 9/9/2020 read as unchanged from prior studies however did note an area of slight activity in the medial left femur.  Contacted radiology and although this was not noted on prior reports appears to have been present.  Subsequent MRI left femur 9/21/2020 with cortical thickening and periosteal edema left iliac us muscle insertion to the medial left femur with no evidence of metastatic disease, favored to represent periosteal change secondary to insertional tendinitis.  · Most recent 3-month interval scans from 11/30/2020 with CT chest abdomen pelvis and bone scan showing stable findings.  · The patient returns today continuing on treatment with Xeloda 1500 mg twice daily 7 days on followed by 7 days off.  She is due for monthly Xgeva today as well.  The patient is continuing to tolerate treatment reasonably well.  Hand-foot symptoms are somewhat worse today especially in the right hand where the rash is extending into the dorsal aspect.  Patient feels that this is tolerable.  She is not having any current diarrhea or mucositis.  She agrees to continue on treatment as planned.  She is scheduled in 3 weeks for repeat CT scan and bone scan and I will see her in 4 weeks for follow-up to review results and she will again be due for Xgeva at that time.  3. Right pulmonary embolism:  · Diagnosed on CT angiogram 10/21/17 involving small right lower lobe pulmonary artery.  Lower extremity Dopplers negative.  · Bilateral lower extremity Dopplers negative again 12/5/17.  · Received chronic Lovenox 1 mg/kg twice per day, transition to oral Eliquis in February 2019, continuing on Eliquis 5 mg twice daily.  4. Cancer related pain:  · Previously receiving Duragesic 50 µg patch every 72  hours along with Dilaudid 4 mg as needed for breakthrough pain  · The patient's pain improved over time and she was able to discontinue both Duragesic and Dilaudid in the interval.  · Patient does take occasional Flexeril at bedtime due to back spasm/pain when she has been more active.  · The patient does have some occasional aggravation of her chronic back pain and does use tramadol 50 mg every 8 hours as needed  · Patient's pain is stable.  I did provide a refill today of tramadol 50 mg every 12 hours as needed, #90.  5. Chemotherapy-induced diarrhea with subsequent C. difficile colitis in the setting of previous ulcerative colitis:  · Patient with reported history of ulcerative colitis, is not on active therapy.  · The patient developed initial diarrhea related to Xeloda at regular dosing.  · Symptoms improved on reduced dose Xeloda  · Flare of symptoms in October 2018 with apparent finding of C. difficile colitis by GI, treated with course of oral vancomycin with improvement in symptoms.  · Patient notes minimal intermittent diarrhea on Xeloda requiring occasional dosing of Imodium.    · Patient however notes no recent diarrhea.  6. Traumatic left tibia/fibular fracture:  · Status post ORIF 12/6/17  · Specimen was sent for pathologic review, negative for evidence of malignancy  7. Hypercalcemia:  · Suspect hypercalcemia of malignancy, calcium in  10-11 range previously.  · Calcium normalized following initiation of monthly Xgeva on 1/23/18.  8. Chemotherapy-induced mucositis:  · Patient had a minimal degree of mucositis with cycle 2.  The patient has magic mouthwash to use as needed.  No subsequent mucositis.  9. Recurrent UTI, bladder wall thickening on CT:  · Patient had an enterococcal UTI on 3/2/18 sensitive to nitrofurantoin and received treatment, unclear how long.  · Recurrent UTI 3/20/18 with urine culture growing Klebsiella, initially treated with nitrofurantoin, transitioned to Levaquin.  · CT 4/4/18  with diffuse bladder wall thickening with increased nodular thickening at the left base.  Referral to urogynecology Dr. May Johnson.  She was placed on a prophylactic dose of trimethoprim 100 mg daily, bladder wall thickening felt to be related to recent recurrent urinary tract infections.  · Patient with urinary symptoms, treated with course of Macrobid at the end of December 2018, urine culture however was negative 12/31/18.  · Patient was found to have Klebsiella UTI 7/29/2019 which was successfully treated with Macrobid with complete resolution of symptoms.  10.   Mobility:  · The patient underwent an intensive course of rehabilitation at [facility].  She graduated from her outpatient course November 2018.  · Overall the patient has improved dramatically in terms of mobility, now walking around 1 mile per day without assistance.  11.  Depression:  · The patient is continuing follow-up in the supportive oncology clinic and is currently continuing on Cymbalta 30 mg twice daily as well as gabapentin 300 mg twice daily.  12. 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  Symptoms did improve and she continued on the same dose.    · The patient has developed increase in hand-foot symptoms, more significant in the right hand with extension into the dorsal aspect.  Patient feels that symptoms are tolerable.  She does continue to use cotton gloves and socks at night.  She continues to apply Maline cream frequently.  13. Evidence of steatosis on scans with mild intermittent elevated liver function studies:  · Liver function studies increased 8/20/19 with ALT 98, AST 70, normal total bilirubin.  · Negative viral hepatitis A, B, and C panel 8/23/18  · Likely related to hepatic steatosis.   · Subsequent improvement in LFTs  · Today, LFTs  normal  14. Chemotherapy induced leukopenia:  · WBC today 3.28 with normal differential  15. GERD:  · The patient continues on omeprazole 40 mg daily (2 x 20 mg).  · The patient also continues to use Carafate 1 g 4 times daily  · Symptoms currently stable  16. Insomnia:  · Patient with prior paradoxical reaction to Benadryl  · Improved previously on temazepam 15 mg nightly as needed.   · Patient noted subsequently that temazepam was having no effect.   · Symptoms currently stable on gabapentin 300 mg in the evening and 300 mg at night  17. Health maintenance:  · Patient notes that she has a history of colon polyps as well as ulcerative colitis and was due for a follow-up colonoscopy on 9/12/2019.  We did discuss there is no necessity to pursue colonoscopy in the setting of her metastatic breast cancer.    · The patient did undergo colonoscopy on 2/7/2020 with findings of muscular hypertrophy and diverticulosis.   18. Right shoulder pain:  · Patient was evaluated by Dr Forrester.  She has experienced difficulty over the past year and a half with her right shoulder although she has not complained of this in prior visits to our office.  She reports difficulty with abduction.    · The patient did undergo MRI of her right shoulder on 11/21/2019 at an outside facility showing multiple abnormalities including supraspinatus tendinosis, labral tear but no evidence of metastatic disease.  · Symptoms currently improved/resolved.  19. Ocular changes in part related to Xeloda:  · Patient experienced a mild degree of blurred vision as well as burning and pruritus.  · She was seen by her ophthalmologist and has been continuing on xiidra ophthalmic drops which have helped.  · Likely both issues are to some extent related to Xeloda.  · Symptoms currently stable to improved.     Plan:  1. Continue oral Xeloda 1500 mg twice a day 7 days on followed by 7 days off.  2. Monthly Xgeva today  3. Continue Eliquis 5 mg twice  daily.  4. Continue use of emollient cream on the hands and feet and continue to wear socks and gloves at night  5. Continue omeprazole 40 mg daily and Carafate 1 g 4 times daily.    6. Continue xiidra ophthalmic drops prescribed by ophthalmology  7. Refill provided for tramadol 50 mg every 8 hours as needed, #90  8. In 3 weeks CT chest abdomen pelvis and bone scan  9. In 4 weeks MD visit with CBC, CMP, magnesium, phosphorus and Xgeva.  We will review scan results.     Patient continuing on high risk medication requiring intensive monitoring.

## 2021-02-02 ENCOUNTER — MEDICATION THERAPY MANAGEMENT (OUTPATIENT)
Dept: PHARMACY | Facility: HOSPITAL | Age: 61
End: 2021-02-02

## 2021-02-02 NOTE — PROGRESS NOTES
[facility] Lab Review- Capecitabine      2/1/2021   WBC 3.40 - 10.80 10*3/mm3 3.63   Neutrophils Absolute 1.70 - 7.00 10*3/mm3 2.24   Hemoglobin 12.0 - 15.9 g/dL 13.0   Hematocrit 34.0 - 46.6 % 40.2   Platelets 140 - 450 10*3/mm3 214   Creatinine 0.57 - 1.00 mg/dL 0.92   eGFR Non African Am >60 mL/min/1.73 62   BUN 8 - 23 mg/dL 20   Sodium 136 - 145 mmol/L 140   Potassium 3.5 - 5.2 mmol/L 4.5   Glucose 65 - 99 mg/dL 126 (A)   Magnesium 1.6 - 2.4 mg/dL 1.8   Calcium 8.6 - 10.5 mg/dL 9.6   Albumin 3.50 - 5.20 g/dL 4.40   Total Protein 6.0 - 8.5 g/dL 6.8   AST (SGOT) 1 - 32 U/L 29   ALT (SGPT) 1 - 33 U/L 26   Alkaline Phosphatase 39 - 117 U/L 57   Total Bilirubin 0.0 - 1.2 mg/dL 0.6   Phosphorus 2.5 - 4.5 mg/dL 3.4     MD dictation noted. Pharmacy will continue to follow.     Thanks,   Lubna Gupta, PharmD

## 2021-02-04 ENCOUNTER — TELEPHONE (OUTPATIENT)
Dept: ONCOLOGY | Facility: CLINIC | Age: 61
End: 2021-02-04

## 2021-02-04 NOTE — TELEPHONE ENCOUNTER
Patient would like to speak to Dara about her Eliquis rx.  She can get this through mail order, but the pharmacist there needs some more info for her to get $10 off.    [phone]

## 2021-02-23 ENCOUNTER — HOSPITAL ENCOUNTER (OUTPATIENT)
Dept: CT IMAGING | Facility: HOSPITAL | Age: 61
Discharge: HOME | End: 2021-02-23
Admitting: INTERNAL MEDICINE

## 2021-02-23 ENCOUNTER — HOSPITAL ENCOUNTER (OUTPATIENT)
Dept: NUCLEAR MEDICINE | Facility: HOSPITAL | Age: 61
Discharge: HOME | End: 2021-02-23

## 2021-02-23 DIAGNOSIS — Z17.1 MALIGNANT NEOPLASM OF OVERLAPPING SITES OF RIGHT BREAST IN FEMALE, ESTROGEN RECEPTOR NEGATIVE (HCC): ICD-10-CM

## 2021-02-23 DIAGNOSIS — C50.811 MALIGNANT NEOPLASM OF OVERLAPPING SITES OF RIGHT BREAST IN FEMALE, ESTROGEN RECEPTOR NEGATIVE (HCC): ICD-10-CM

## 2021-02-23 LAB — CREAT BLDA-MCNC: 0.9 MG/DL (ref 0.6–1.3)

## 2021-02-23 PROCEDURE — 71260 CT THORAX DX C+: CPT

## 2021-02-23 PROCEDURE — 82565 ASSAY OF CREATININE: CPT

## 2021-02-23 PROCEDURE — 0 DIATRIZOATE MEGLUMINE & SODIUM PER 1 ML: Performed by: INTERNAL MEDICINE

## 2021-02-23 PROCEDURE — 74177 CT ABD & PELVIS W/CONTRAST: CPT

## 2021-02-23 PROCEDURE — A9503 TC99M MEDRONATE: HCPCS | Performed by: INTERNAL MEDICINE

## 2021-02-23 PROCEDURE — 0 TECHNETIUM MEDRONATE KIT: Performed by: INTERNAL MEDICINE

## 2021-02-23 PROCEDURE — 25010000002 IOPAMIDOL 61 % SOLUTION: Performed by: INTERNAL MEDICINE

## 2021-02-23 PROCEDURE — 78306 BONE IMAGING WHOLE BODY: CPT

## 2021-02-23 RX ORDER — TC 99M MEDRONATE 20 MG/10ML
20.3 INJECTION, POWDER, LYOPHILIZED, FOR SOLUTION INTRAVENOUS
Status: COMPLETED | OUTPATIENT
Start: 2021-02-23 | End: 2021-02-23

## 2021-02-23 RX ADMIN — DIATRIZOATE MEGLUMINE AND DIATRIZOATE SODIUM 30 ML: 600; 100 SOLUTION ORAL; RECTAL at 12:03

## 2021-02-23 RX ADMIN — IOPAMIDOL 85 ML: 612 INJECTION, SOLUTION INTRAVENOUS at 12:05

## 2021-02-23 RX ADMIN — Medication 20.3 MILLICURIE: at 10:35

## 2021-02-24 RX ORDER — CAPECITABINE 500 MG/1
TABLET, FILM COATED ORAL
Qty: 84 TABLET | Refills: 3 | Status: SHIPPED | OUTPATIENT
Start: 2021-02-24 | End: 2021-06-16 | Stop reason: SDUPTHER

## 2021-02-24 NOTE — TELEPHONE ENCOUNTER
Capecitabine refill request rec electronically from [facility]. Per last office note-pt is to continue 1500 mg BID 7 days on then 7 days off. Her last delivery was on 2/2/2021.    I have routed the rx to Dr delgado for signature. Once signed it will be escribed to [facility]

## 2021-02-25 ENCOUNTER — OFFICE VISIT (OUTPATIENT)
Dept: PSYCHIATRY | Facility: HOSPITAL | Age: 61
End: 2021-02-25

## 2021-02-25 DIAGNOSIS — F33.1 MAJOR DEPRESSIVE DISORDER, RECURRENT EPISODE, MODERATE (HCC): Primary | ICD-10-CM

## 2021-02-25 DIAGNOSIS — F41.1 GENERALIZED ANXIETY DISORDER: ICD-10-CM

## 2021-02-25 PROCEDURE — 99214 OFFICE O/P EST MOD 30 MIN: CPT | Performed by: NURSE PRACTITIONER

## 2021-03-01 ENCOUNTER — LAB (OUTPATIENT)
Dept: OTHER | Facility: HOSPITAL | Age: 61
End: 2021-03-01

## 2021-03-01 ENCOUNTER — OFFICE VISIT (OUTPATIENT)
Dept: ONCOLOGY | Facility: CLINIC | Age: 61
End: 2021-03-01

## 2021-03-01 ENCOUNTER — INFUSION (OUTPATIENT)
Dept: ONCOLOGY | Facility: HOSPITAL | Age: 61
End: 2021-03-01

## 2021-03-01 VITALS
WEIGHT: 185.9 LBS | HEIGHT: 62 IN | TEMPERATURE: 97.7 F | SYSTOLIC BLOOD PRESSURE: 144 MMHG | RESPIRATION RATE: 18 BRPM | BODY MASS INDEX: 34.21 KG/M2 | OXYGEN SATURATION: 97 % | DIASTOLIC BLOOD PRESSURE: 87 MMHG | HEART RATE: 85 BPM

## 2021-03-01 DIAGNOSIS — Z17.1 MALIGNANT NEOPLASM OF OVERLAPPING SITES OF RIGHT BREAST IN FEMALE, ESTROGEN RECEPTOR NEGATIVE (HCC): ICD-10-CM

## 2021-03-01 DIAGNOSIS — Z17.1 MALIGNANT NEOPLASM OF OVERLAPPING SITES OF RIGHT BREAST IN FEMALE, ESTROGEN RECEPTOR NEGATIVE (HCC): Primary | ICD-10-CM

## 2021-03-01 DIAGNOSIS — C50.811 MALIGNANT NEOPLASM OF OVERLAPPING SITES OF RIGHT BREAST IN FEMALE, ESTROGEN RECEPTOR NEGATIVE (HCC): Primary | ICD-10-CM

## 2021-03-01 DIAGNOSIS — C50.811 MALIGNANT NEOPLASM OF OVERLAPPING SITES OF RIGHT BREAST IN FEMALE, ESTROGEN RECEPTOR NEGATIVE (HCC): ICD-10-CM

## 2021-03-01 LAB
ALBUMIN SERPL-MCNC: 4.2 G/DL (ref 3.5–5.2)
ALBUMIN/GLOB SERPL: 1.7 G/DL
ALP SERPL-CCNC: 56 U/L (ref 39–117)
ALT SERPL W P-5'-P-CCNC: 21 U/L (ref 1–33)
ANION GAP SERPL CALCULATED.3IONS-SCNC: 9.8 MMOL/L (ref 5–15)
AST SERPL-CCNC: 26 U/L (ref 1–32)
BASOPHILS # BLD AUTO: 0.03 10*3/MM3 (ref 0–0.2)
BASOPHILS NFR BLD AUTO: 0.7 % (ref 0–1.5)
BILIRUB SERPL-MCNC: 0.6 MG/DL (ref 0–1.2)
BUN SERPL-MCNC: 21 MG/DL (ref 8–23)
BUN/CREAT SERPL: 24.1 (ref 7–25)
CALCIUM SPEC-SCNC: 9.4 MG/DL (ref 8.6–10.5)
CHLORIDE SERPL-SCNC: 104 MMOL/L (ref 98–107)
CO2 SERPL-SCNC: 27.2 MMOL/L (ref 22–29)
CREAT SERPL-MCNC: 0.87 MG/DL (ref 0.57–1)
DEPRECATED RDW RBC AUTO: 59.9 FL (ref 37–54)
EOSINOPHIL # BLD AUTO: 0.08 10*3/MM3 (ref 0–0.4)
EOSINOPHIL NFR BLD AUTO: 2 % (ref 0.3–6.2)
ERYTHROCYTE [DISTWIDTH] IN BLOOD BY AUTOMATED COUNT: 16.5 % (ref 12.3–15.4)
GFR SERPL CREATININE-BSD FRML MDRD: 66 ML/MIN/1.73
GLOBULIN UR ELPH-MCNC: 2.5 GM/DL
GLUCOSE SERPL-MCNC: 99 MG/DL (ref 65–99)
HCT VFR BLD AUTO: 38.4 % (ref 34–46.6)
HGB BLD-MCNC: 12.6 G/DL (ref 12–15.9)
IMM GRANULOCYTES # BLD AUTO: 0.01 10*3/MM3 (ref 0–0.05)
IMM GRANULOCYTES NFR BLD AUTO: 0.2 % (ref 0–0.5)
LYMPHOCYTES # BLD AUTO: 0.98 10*3/MM3 (ref 0.7–3.1)
LYMPHOCYTES NFR BLD AUTO: 24 % (ref 19.6–45.3)
MAGNESIUM SERPL-MCNC: 2 MG/DL (ref 1.6–2.4)
MCH RBC QN AUTO: 32.3 PG (ref 26.6–33)
MCHC RBC AUTO-ENTMCNC: 32.8 G/DL (ref 31.5–35.7)
MCV RBC AUTO: 98.5 FL (ref 79–97)
MONOCYTES # BLD AUTO: 0.3 10*3/MM3 (ref 0.1–0.9)
MONOCYTES NFR BLD AUTO: 7.3 % (ref 5–12)
NEUTROPHILS NFR BLD AUTO: 2.69 10*3/MM3 (ref 1.7–7)
NEUTROPHILS NFR BLD AUTO: 65.8 % (ref 42.7–76)
NRBC BLD AUTO-RTO: 0 /100 WBC (ref 0–0.2)
PHOSPHATE SERPL-MCNC: 3.4 MG/DL (ref 2.5–4.5)
PLATELET # BLD AUTO: 217 10*3/MM3 (ref 140–450)
PMV BLD AUTO: 11 FL (ref 6–12)
POTASSIUM SERPL-SCNC: 4.8 MMOL/L (ref 3.5–5.2)
PROT SERPL-MCNC: 6.7 G/DL (ref 6–8.5)
RBC # BLD AUTO: 3.9 10*6/MM3 (ref 3.77–5.28)
SODIUM SERPL-SCNC: 141 MMOL/L (ref 136–145)
WBC # BLD AUTO: 4.09 10*3/MM3 (ref 3.4–10.8)

## 2021-03-01 PROCEDURE — 99214 OFFICE O/P EST MOD 30 MIN: CPT | Performed by: INTERNAL MEDICINE

## 2021-03-01 PROCEDURE — 84100 ASSAY OF PHOSPHORUS: CPT | Performed by: INTERNAL MEDICINE

## 2021-03-01 PROCEDURE — 83735 ASSAY OF MAGNESIUM: CPT | Performed by: INTERNAL MEDICINE

## 2021-03-01 PROCEDURE — 85025 COMPLETE CBC W/AUTO DIFF WBC: CPT | Performed by: INTERNAL MEDICINE

## 2021-03-01 PROCEDURE — 96372 THER/PROPH/DIAG INJ SC/IM: CPT

## 2021-03-01 PROCEDURE — 36415 COLL VENOUS BLD VENIPUNCTURE: CPT

## 2021-03-01 PROCEDURE — 80053 COMPREHEN METABOLIC PANEL: CPT | Performed by: INTERNAL MEDICINE

## 2021-03-01 PROCEDURE — 25010000002 DENOSUMAB 120 MG/1.7ML SOLUTION: Performed by: INTERNAL MEDICINE

## 2021-03-01 RX ADMIN — DENOSUMAB 120 MG: 120 INJECTION SUBCUTANEOUS at 14:45

## 2021-03-01 NOTE — PROGRESS NOTES
"Chief Complaint  Previous Stage Ib ([id number]) ER/NV positive, HER-2/peter negative right breast cancer with subsequent metastatic disease identified 10/8/2017, history of right pulmonary embolism, cancer related pain, chemotherapy-induced diarrhea, chemotherapy-induced mucositis, chemotherapy-induced hand-foot syndrome    Subjective        History of Present Illness  The patient returns today in follow-up continuing on oral Xeloda 1500 mg twice daily for 7 days on followed by 7 days off as well as monthly Xgeva and Eliquis 5 mg twice daily.  The patient today has 3-month interval follow-up CT chest abdomen pelvis and bone scan to review.  She has had some difficulty tolerating Xeloda mainly in terms of hand-foot symptoms, worse in her hands.  She does use emollient cream frequently.  She was referred to dermatology and was prescribed triamcinolone 0.1% cream to use for 1 week on followed by 1 week off.  This has produced some significant improvement.  She is currently on 1 week off treatment with triamcinolone.  She notes that the pain in her hands has improved slightly.  She has had some mild pain in her left foot and believes this is related to her brace and is going to be fitted for a new brace apparently.  She has had difficulty recently with some intermittent mild right epistaxis.  She has tried using saline nasal spray which has helped intermittently and is using some Vaseline around her nares at night.  She notes a stable appetite.  She has had some difficulty recently with depression and was seen in supportive oncology clinic.  With recommendation to try support group at [facility] and she did so finding this quite helpful and is continuing to attend on a regular basis.  She was last seen in the supportive oncology clinic on 2/25/2021.      Objective   Vital Signs:   /87   Pulse 85   Temp 97.7 °F (36.5 °C) (Temporal)   Resp 18   Ht 157.5 cm (62.01\")   Wt 84.3 kg (185 lb 14.4 oz)   SpO2 97%   " BMI 33.99 kg/m²     Physical Exam  Constitutional:       Appearance: She is well-developed.   Eyes:      Conjunctiva/sclera: Conjunctivae normal.   Neck:      Thyroid: No thyromegaly.   Cardiovascular:      Rate and Rhythm: Normal rate and regular rhythm.      Heart sounds: No murmur. No friction rub. No gallop.    Pulmonary:      Effort: No respiratory distress.      Breath sounds: Normal breath sounds.   Abdominal:      General: Bowel sounds are normal. There is no distension.      Palpations: Abdomen is soft.      Tenderness: There is no abdominal tenderness.   Lymphadenopathy:      Head:      Right side of head: No submandibular adenopathy.      Cervical: No cervical adenopathy.      Upper Body:      Right upper body: No supraclavicular adenopathy.      Left upper body: No supraclavicular adenopathy.   Skin:     General: Skin is warm and dry.      Findings: Rash present.      Comments: There is significant bilateral palmar erythema, on the right hand there is extension onto the dorsal aspect of the hand.  Compared to the last exam there has been a very slight degree of improvement   Neurological:      Mental Status: She is alert and oriented to person, place, and time.      Cranial Nerves: No cranial nerve deficit.      Motor: No abnormal muscle tone.      Deep Tendon Reflexes: Reflexes normal.   Psychiatric:         Behavior: Behavior normal.        Result Review : Reviewed CBC, CMP, magnesium, phosphorus today.  Reviewed CT chest abdomen pelvis and bone scan from 2/23/2021.       Assessment and Plan     1. Previous Stage Ib ([id number]) right breast cancer:  · Diagnosed May 2010 with excisional biopsy for invasive ductal carcinoma, 1.3 cm, grade 2, ER 90%, MA 80%, HER-2/peter negative (1+ IHC).    · Subsequent right mastectomy in July 2010 with no residual breast malignancy, 1/5 sentinel lymph node with micrometastasis (0.25 mm).    · Treated in the [place] system with adjuvant AC ×4 cycles in 2010 (no taxanes  administered due to underlying Charcot-Saloni-Tooth with peripheral neuropathy).    · Adjuvant Femara (postmenopausal) initiated October 2010 with plan to treat ×10 years.    · Genetic testing reportedly negative.    · Developed osteopenia treated with Prolia beginning 2/27/13. Subsequently discontinued due to identification of metastatic disease.  2. Recurrent/metastatic disease identified 10/8/17:  · Disease involving thoracic spine with cord compression at T6, lumbosacral involvement, sternal and right sternoclavicular involvement.    · Femara discontinued in 10/2017.    · Radiation administered (in the [place] system) to the thoracic spine beginning 10/19/17 treating T3-T9 to a dose of 24 gray in 6 fractions.  · Evaluation with MRI 12/8/17 showing persistent T6 cord compression with persistent neurologic compromise requiring surgical treatment 12/11/17 with T6 laminectomy/corpectomy and T3-T9 fusion.  Pathology with metastatic carcinoma of breast origin, ER negative, IL negative, HER-2/peter negative (1+ IHC).  · Additional staging evaluation 12/8/17 with no evidence of visceral metastatic disease, bone scan showing involvement of thoracic spine, sternum, left humerus, mid frontal bone.  No plane film correlate of left humerus lesion.  MRI lumbar spine with small intradural L3 metastasis.  CA 15-3 12/6/17- 17.  · Palliative radiation therapy to L3 dural metastasis and left humerus initiated 1/15/18 (10 fractions), completed 1/26/18.  · Hypercalcemia of malignancy with calcium in the 10-11 range.  · Initiation of monthly Xgeva 1/23/18.  · Baseline CT scan 1/30/18 with no evidence of visceral involvement.  Cluster of nodular opacities in the right lower lobe suspected to be infectious or related to bronchiolitis. Bone scan 1/30/18 showed postsurgical change in the thoracic spine, stable uptake in the frontal bone, no new areas of disease.  · Initiation of palliative oral single agent Xeloda 2/7/18 2000 mg a.m., 1500  mg p.m. for 14/21 days.   · Following 3 cycles xeloda, bone scan 4/4/18 showed no change from the prior study.  CT scan 4/4/18 showed a small pericardial effusion of unclear significance as well as a subcutaneous nodule in the right lateral chest wall.  Subsequent evaluation with echocardiogram 4/17/18 showed no evidence of pericardial effusion.  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  We discussed the possibility of surgical excision of the right subcutaneous chest wall lesion for more definitive diagnosis.  Reviewed previous CT images dating back to 12/8/17 and the lesion was present even at that time measuring around 1.7 cm although not commented on in the radiology report.  As this appears to be an indolent low-grade process unrelated to her breast cancer, recommendee foregoing surgical excision at this time and monitoring this area on future scans.  The patient agreed.    · Following 6 cycles of Xeloda, CT 6/6/18  showed stable findings, no evidence of progressive disease.  There was a comment regarding subcutaneous abnormality in the anterior abdominal wall and this was related to Lovenox injection sites.  Bone scan 6/6/18 showed no interval change.   · CT scan and bone scan 8/13/18 following 9 cycles of Xeloda showed stable findings with no evidence of significant visceral metastases.  Her bone lesions appear stable on bone scan.  The spindle cell neoplasm in her right chest wall actually decreased in size from 2 cm down to 1.6 cm.    · The patient experienced some symptoms of diarrhea, anorexia, generalized weakness during cycle 9 Xeloda it was unclear whether this was related to a viral gastroenteritis or toxicity from treatment.  Symptoms recurred during cycle 10 and treatment was cut short by 2 days.  Symptoms attributed to Xeloda.  With cycle 11, dose and  schedule altered to 1500 mg twice daily for 7 days on followed by 7 days off .  · CT scan 9/9/2020 with no significant changes.  Bone scan 9/9/2020 read as unchanged from prior studies however did note an area of slight activity in the medial left femur.  Contacted radiology and although this was not noted on prior reports appears to have been present.  Subsequent MRI left femur 9/21/2020 with cortical thickening and periosteal edema left iliac us muscle insertion to the medial left femur with no evidence of metastatic disease, favored to represent periosteal change secondary to insertional tendinitis.  · Most recent 3-month interval scans from 2/23/2021 with CT chest abdomen pelvis and bone scan showing stable findings.  · The patient returns today continuing on treatment with Xeloda 1500 mg twice daily 7 days on followed by 7 days off.  She is due for monthly Xgeva today as well.  The patient does continue to tolerate Xeloda reasonably well with the exception of her hand-foot symptoms.  Fortunately triamcinolone cream 0.1% use for 1 week on followed by 1 week off has produced some improvement as recommended by dermatology.  She does feel that her quality of life is acceptable to continue on treatment.  We reviewed her scans from 2/21/2021 that shows stable findings on both CT and bone scan.  I did recommend that we continue treatment today and the patient agrees.  We will plan repeat CT and bone scan at a 3-month interval.  In 1 month she will return for nurse practitioner visit with Xgeva.  I will see her in 2 months when she is due again for Xgeva.  I will see her in 3 months for Xgeva and just prior to that she will undergo CT scans and bone scan.  3. Right pulmonary embolism:  · Diagnosed on CT angiogram 10/21/17 involving small right lower lobe pulmonary artery.  Lower extremity Dopplers negative.  · Bilateral lower extremity Dopplers negative again 12/5/17.  · Received chronic Lovenox 1 mg/kg twice per day,  transition to oral Eliquis in February 2019, continuing on Eliquis 5 mg twice daily.  4. Cancer related pain:  · Previously receiving Duragesic 50 µg patch every 72 hours along with Dilaudid 4 mg as needed for breakthrough pain  · The patient's pain improved over time and she was able to discontinue both Duragesic and Dilaudid in the interval.  · Patient does take occasional Flexeril at bedtime due to back spasm/pain when she has been more active.  · The patient does have some occasional aggravation of her chronic back pain and does use tramadol 50 mg every 8 hours as needed  · Patient's pain is stable.  She does continue to use tramadol 50 mg every 12 hours as needed which has been effective.  5. Chemotherapy-induced diarrhea with subsequent C. difficile colitis in the setting of previous ulcerative colitis:  · Patient with reported history of ulcerative colitis, is not on active therapy.  · The patient developed initial diarrhea related to Xeloda at regular dosing.  · Symptoms improved on reduced dose Xeloda  · Flare of symptoms in October 2018 with apparent finding of C. difficile colitis by GI, treated with course of oral vancomycin with improvement in symptoms.  · Patient notes minimal intermittent diarrhea on Xeloda requiring occasional dosing of Imodium.    · Patient however notes no recent diarrhea.  6. Traumatic left tibia/fibular fracture:  · Status post ORIF 12/6/17  · Specimen was sent for pathologic review, negative for evidence of malignancy  7. Hypercalcemia:  · Suspect hypercalcemia of malignancy, calcium in  10-11 range previously.  · Calcium normalized following initiation of monthly Xgeva on 1/23/18.  8. Chemotherapy-induced mucositis:  · Patient had a minimal degree of mucositis with cycle 2.  The patient has magic mouthwash to use as needed.  No subsequent mucositis.  9. Recurrent UTI, bladder wall thickening on CT:  · Patient had an enterococcal UTI on 3/2/18 sensitive to nitrofurantoin and  received treatment, unclear how long.  · Recurrent UTI 3/20/18 with urine culture growing Klebsiella, initially treated with nitrofurantoin, transitioned to Levaquin.  · CT 4/4/18 with diffuse bladder wall thickening with increased nodular thickening at the left base.  Referral to urogynecology Dr. May Johnson.  She was placed on a prophylactic dose of trimethoprim 100 mg daily, bladder wall thickening felt to be related to recent recurrent urinary tract infections.  · Patient with urinary symptoms, treated with course of Macrobid at the end of December 2018, urine culture however was negative 12/31/18.  · Patient was found to have Klebsiella UTI 7/29/2019 which was successfully treated with Macrobid with complete resolution of symptoms.  10.   Mobility:  · The patient underwent an intensive course of [facility] at [facility].  She graduated from her outpatient course November 2018.  · Overall the patient has improved dramatically in terms of mobility, now walking around 1 mile per day without assistance.  11.  Depression:  · The patient is continuing follow-up in the supportive oncology clinic and is currently continuing on Cymbalta 30 mg twice daily as well as gabapentin 300 mg twice daily.  · The patient feels that her depression symptoms are currently fairly well controlled.  She has seen some benefit from attending support groups at [facility] which she plans to continue.  12. 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  Symptoms did improve and she continued on the same dose.    · Patient was referred to dermatology and has been continuing on triamcinolone 0.1% cream used for 1 week on followed by 1 week off which led to some further improvement.   · Today, symptoms involving the hands are somewhat improved using frequent  application emollient cream and use of triamcinolone cream per dermatology as noted above.  Patient feels that symptoms are acceptable to continue on treatment.  13. Evidence of steatosis on scans with mild intermittent elevated liver function studies:  · Liver function studies increased 8/20/19 with ALT 98, AST 70, normal total bilirubin.  · Negative viral hepatitis A, B, and C panel 8/23/18  · Likely related to hepatic steatosis.   · Subsequent improvement in LFTs  · Today, LFTs normal  14. Chemotherapy induced leukopenia:  · WBC today  4.09 with normal differential  15. GERD:  · The patient continues on omeprazole 40 mg daily (2 x 20 mg).  · The patient also continues to use Carafate 1 g 4 times daily  · Symptoms currently stable  16. Insomnia:  · Patient with prior paradoxical reaction to Benadryl  · Improved previously on temazepam 15 mg nightly as needed.   · Patient noted subsequently that temazepam was having no effect.   · Symptoms currently stable on gabapentin 300 mg in the evening and 300 mg at night  17. Health maintenance:  · Patient notes that she has a history of colon polyps as well as ulcerative colitis and was due for a follow-up colonoscopy on 9/12/2019.  We did discuss there is no necessity to pursue colonoscopy in the setting of her metastatic breast cancer.    · The patient did undergo colonoscopy on 2/7/2020 with findings of muscular hypertrophy and diverticulosis.   18. Right shoulder pain:  · Patient was evaluated by Dr Forrester.  She has experienced difficulty over the past year and a half with her right shoulder although she has not complained of this in prior visits to our office.  She reports difficulty with abduction.    · The patient did undergo MRI of her right shoulder on 11/21/2019 at an outside facility showing multiple abnormalities including supraspinatus tendinosis, labral tear but no evidence of metastatic disease.  · Symptoms currently improved/resolved.  19. Ocular changes  in part related to Xeloda:  · Patient experienced a mild degree of blurred vision as well as burning and pruritus.  · She was seen by her ophthalmologist and has been continuing on xiidra ophthalmic drops which have helped.  · Likely both issues are to some extent related to Xeloda.  · Symptoms currently stable to improved.  20. Right-sided epistaxis  · This has been an issue recently, epistaxis has been mild.  I recommended that the patient continue using saline nasal spray as well as Vaseline around the nares at night and also use humidifier by the bedside during winter months.     Plan:  1. Continue oral Xeloda 1500 mg twice a day 7 days on followed by 7 days off.  2. Monthly Xgeva today  3. Continue Eliquis 5 mg twice daily.  4. Continue use of emollient cream on the hands and feet and continue to wear socks and gloves at night as well as use of triamcinolone cream as prescribed by dermatology (1 week on followed by 1 week off).  5. Continue omeprazole 40 mg daily and Carafate 1 g 4 times daily.    6. Continue xiidra ophthalmic drops prescribed by ophthalmology  7. In 4 weeks nurse practitioner visit with CBC, CMP, magnesium, phosphorus and patient will be due for Xgeva  8. In 8 weeks MD visit with CBC, CMP, magnesium, phosphorus and patient will be due for Xgeva  9. In 11 weeks CT chest abdomen and pelvis and bone scan  10. In 12 weeks MD visit with CBC, CMP, magnesium, phosphorus and Xgeva.  We will review scan results.     Patient continuing on high risk medication requiring intensive monitoring.

## 2021-03-02 ENCOUNTER — MEDICATION THERAPY MANAGEMENT (OUTPATIENT)
Dept: PHARMACY | Facility: HOSPITAL | Age: 61
End: 2021-03-02

## 2021-03-02 NOTE — PROGRESS NOTES
[facility] Lab Review- Capecitabine      3/1/2021   WBC 3.40 - 10.80 10*3/mm3 4.09   Neutrophils Absolute 1.70 - 7.00 10*3/mm3 2.69   Hemoglobin 12.0 - 15.9 g/dL 12.6   Hematocrit 34.0 - 46.6 % 38.4   Platelets 140 - 450 10*3/mm3 217   Creatinine 0.57 - 1.00 mg/dL 0.87   eGFR Non African Am >60 mL/min/1.73 66   BUN 8 - 23 mg/dL 21   Sodium 136 - 145 mmol/L 141   Potassium 3.5 - 5.2 mmol/L 4.8   Glucose 65 - 99 mg/dL 99   Magnesium 1.6 - 2.4 mg/dL 2.0   Calcium 8.6 - 10.5 mg/dL 9.4   Albumin 3.50 - 5.20 g/dL 4.20   Total Protein 6.0 - 8.5 g/dL 6.7   AST (SGOT) 1 - 32 U/L 26   ALT (SGPT) 1 - 33 U/L 21   Alkaline Phosphatase 39 - 117 U/L 56   Total Bilirubin 0.0 - 1.2 mg/dL 0.6   Phosphorus 2.5 - 4.5 mg/dL 3.4     MD dictation noted. Pharmacy will continue to follow.     Thanks,   Lubna Gupta, PharmD

## 2021-03-03 RX ORDER — DULOXETIN HYDROCHLORIDE 30 MG/1
CAPSULE, DELAYED RELEASE ORAL
Qty: 180 CAPSULE | Refills: 3 | Status: SHIPPED | OUTPATIENT
Start: 2021-03-03 | End: 2022-03-08 | Stop reason: SDUPTHER

## 2021-03-05 ENCOUNTER — TELEPHONE (OUTPATIENT)
Dept: ONCOLOGY | Facility: CLINIC | Age: 61
End: 2021-03-05

## 2021-03-05 RX ORDER — TRAMADOL HYDROCHLORIDE 50 MG/1
50 TABLET ORAL EVERY 8 HOURS PRN
Qty: 90 TABLET | Refills: 0 | Status: CANCELLED | OUTPATIENT
Start: 2021-03-05

## 2021-03-05 RX ORDER — TRAMADOL HYDROCHLORIDE 50 MG/1
TABLET ORAL
Qty: 90 TABLET | Refills: 0 | Status: SHIPPED | OUTPATIENT
Start: 2021-03-05 | End: 2021-04-16

## 2021-03-05 NOTE — TELEPHONE ENCOUNTER
Caller: MELISA CLARK    Relationship to patient: SELF    Best call back number: [phone]    Patient is needing: PT IS FOLLOWING UP ON TRAMADOL AND [facility] SENT REQUEST OVER. PT WAS NOTIFIED THAT IT LOOKED LIKE IT WAS.    PT HAS 5 PILLS LEFT.    PT ALSO WANTED TO LET US KNOW HER TENDONITIS IS ACTING UP SINCE SHE IS WALKING MORE.

## 2021-03-05 NOTE — TELEPHONE ENCOUNTER
Called pt back. Informed her that we did receive the RX and would have to Dr. Hancock sign. She V/U.

## 2021-03-08 ENCOUNTER — TELEPHONE (OUTPATIENT)
Dept: INTERNAL MEDICINE | Facility: CLINIC | Age: 61
End: 2021-03-08

## 2021-03-08 ENCOUNTER — OFFICE VISIT (OUTPATIENT)
Dept: INTERNAL MEDICINE | Facility: CLINIC | Age: 61
End: 2021-03-08

## 2021-03-08 VITALS — HEIGHT: 62 IN | WEIGHT: 183 LBS | BODY MASS INDEX: 33.68 KG/M2

## 2021-03-08 DIAGNOSIS — R35.0 URINE FREQUENCY: Primary | ICD-10-CM

## 2021-03-08 DIAGNOSIS — L98.491 CALLOUS ULCER, LIMITED TO BREAKDOWN OF SKIN (HCC): ICD-10-CM

## 2021-03-08 DIAGNOSIS — N30.00 ACUTE CYSTITIS WITHOUT HEMATURIA: ICD-10-CM

## 2021-03-08 PROCEDURE — 99214 OFFICE O/P EST MOD 30 MIN: CPT | Performed by: INTERNAL MEDICINE

## 2021-03-08 RX ORDER — PHENAZOPYRIDINE HYDROCHLORIDE 200 MG/1
200 TABLET, FILM COATED ORAL 3 TIMES DAILY PRN
Status: CANCELLED | OUTPATIENT
Start: 2021-03-08

## 2021-03-08 RX ORDER — CEPHALEXIN 500 MG/1
500 CAPSULE ORAL 2 TIMES DAILY
Qty: 14 CAPSULE | Refills: 0 | Status: SHIPPED | OUTPATIENT
Start: 2021-03-08 | End: 2021-03-24

## 2021-03-08 NOTE — TELEPHONE ENCOUNTER
Caller: Martha Davey    Relationship: Self    Best call back number: [phone]    Medication needed:   Requested Prescriptions     Pending Prescriptions Disp Refills   • phenazopyridine (PYRIDIUM) 200 MG tablet       Sig: Take 1 tablet by mouth 3 (Three) Times a Day As Needed for Bladder Spasms.       When do you need the refill by: ASAP     What details did the patient provide when requesting the medication: Patient is out of medication and is having the bladder spasms again. Please call patient and advise.     Does the patient have less than a 3 day supply:  [x] Yes  [] No    What is the patient's preferred pharmacy: [facility] - [address] - [phone]  - [phone] FX

## 2021-03-08 NOTE — PROGRESS NOTES
Chief Complaint   Patient presents with   • Nocturia   • Difficulty Urinating   • Foot Pain     Left        History of Present Illness   Martha Davey is a 60 y.o. female presents for acute needs. She has increased urinary urgency and frequency. She is needing to race to the bathroom. Secondary complaint of a nonhealing pressure ulcer lateral aspect left foot. She wears braces for charcot ambika disease. She has skin break down palms and soles from xeloda. Decreased mood related to multiple stressors. Son w/ autism etc.     The following portions of the patient's history were reviewed and updated as appropriate: allergies, current medications, past family history, past medical history, past social history, past surgical history and problem list.  Current Outpatient Medications on File Prior to Visit   Medication Sig Dispense Refill   • acetaminophen (TYLENOL) 500 MG tablet Take 500 mg by mouth Every 6 (Six) Hours As Needed for Mild Pain .     • apixaban (Eliquis) 5 MG tablet tablet Take 1 tablet by mouth Every 12 (Twelve) Hours. 180 tablet 1   • calcium carbonate (OS-CARY) 600 MG tablet Take 600 mg by mouth 2 (Two) Times a Day With Meals.     • capecitabine (XELODA) 500 MG chemo tablet Take 3 tablets (1500 mg) by mouth twice a day for 7 days on then 7 days off. 84 tablet 3   • cholecalciferol (VITAMIN D3) 1000 units tablet Take 1,000 Units by mouth Daily.     • diphenoxylate-atropine (LOMOTIL) 2.5-0.025 MG per tablet Take 1 tablet by mouth 4 (Four) Times a Day As Needed for Diarrhea. 60 tablet 0   • DULoxetine (CYMBALTA) 30 MG capsule TAKE 1 CAPSULE BY MOUTH  TWICE DAILY 180 capsule 3   • FLONASE ALLERGY RELIEF 50 MCG/ACT nasal spray 2 sprays into each nostril daily.  11   • gabapentin (NEURONTIN) 300 MG capsule TAKE 1 CAPSULE BY MOUTH  TWICE DAILY 180 capsule 3   • losartan (COZAAR) 50 MG tablet Take 1 tablet by mouth Daily. 90 tablet 2   • mesalamine (LIALDA) 1.2 g EC tablet TAKE 1 TABLET BY MOUTH  TWICE DAILY 180  tablet 3   • metaxalone (Skelaxin) 800 MG tablet Take 1 tablet by mouth 3 (Three) Times a Day As Needed for Muscle Spasms. 30 tablet 3   • omeprazole (PriLOSEC) 40 MG capsule TAKE 1 CAPSULE DAILY 90 capsule 2   • ondansetron ODT (ZOFRAN-ODT) 8 MG disintegrating tablet Take 1 tablet by mouth Every 8 (Eight) Hours As Needed for Nausea or Vomiting. 30 tablet 1   • phenazopyridine (PYRIDIUM) 200 MG tablet Take 200 mg by mouth 3 (Three) Times a Day As Needed for bladder spasms.     • prochlorperazine (COMPAZINE) 10 MG tablet Take 1 tablet by mouth Every 6 (Six) Hours As Needed for Nausea or Vomiting. 30 tablet 0   • sennosides-docusate sodium (SENOKOT-S) 8.6-50 MG tablet Take 2 tablets by mouth 2 (Two) Times a Day. 90 tablet 2   • sucralfate (CARAFATE) 1 g tablet Take 1 tablet by mouth 4 (Four) Times a Day. 120 tablet 2   • temazepam (RESTORIL) 15 MG capsule Take 1 capsule by mouth At Night As Needed for Sleep. 90 capsule 1   • traMADol (ULTRAM) 50 MG tablet TAKE 1 TABLET BY MOUTH  EVERY 8 HOURS AS NEEDED FOR MODERATE PAIN 90 tablet 0   • triamcinolone (KENALOG) 0.1 % cream Apply  topically to the appropriate area as directed 2 (Two) Times a Day. 15 g 0   • trimethoprim (TRIMPEX) 100 MG tablet Take 100 mg by mouth Daily.     • XIIDRA 5 % ophthalmic solution INSTILL 1 DROP INTO EACH EYE BID.     • ketoconazole (NIZORAL) 2 % cream Apply  topically to the appropriate area as directed Daily. 15 g 0   • mupirocin (BACTROBAN) 2 % ointment JUANITA TO SURGICAL SITE AND COVER WITH BAND AID D UNTIL HEALED       No current facility-administered medications on file prior to visit.     Review of Systems   Constitutional: Negative.    HENT: Negative.    Eyes: Negative.    Respiratory: Negative.    Cardiovascular: Negative.    Gastrointestinal: Negative.    Endocrine: Negative.    Genitourinary: Negative.    Musculoskeletal: Positive for gait problem.        Foot pain   Skin:        Foot ulceration   Allergic/Immunologic: Negative.   "  Hematological: Negative.    Psychiatric/Behavioral: Negative.        Objective   Physical Exam  Vitals and nursing note reviewed.   Constitutional:       Appearance: Normal appearance.   HENT:      Head: Normocephalic and atraumatic.      Right Ear: Tympanic membrane normal.      Left Ear: Tympanic membrane normal.      Nose: Nose normal.      Mouth/Throat:      Mouth: Mucous membranes are moist.   Eyes:      Extraocular Movements: Extraocular movements intact.      Pupils: Pupils are equal, round, and reactive to light.   Cardiovascular:      Rate and Rhythm: Normal rate and regular rhythm.      Pulses: Normal pulses.      Heart sounds: Normal heart sounds.   Pulmonary:      Effort: Pulmonary effort is normal.      Breath sounds: Normal breath sounds.   Abdominal:      General: Abdomen is flat.      Palpations: Abdomen is soft.   Musculoskeletal:      Cervical back: Normal range of motion.      Comments: Left foot lateral aspect w/ callous formation that is ulcerative. laz protrusion.    Skin:     General: Skin is warm and dry.   Neurological:      General: No focal deficit present.      Mental Status: She is alert and oriented to person, place, and time.   Psychiatric:         Mood and Affect: Mood normal.         Behavior: Behavior normal.         Thought Content: Thought content normal.         Judgment: Judgment normal.      Comments: Slightly decreased mood.           Ht 157.5 cm (62\")   Wt 83 kg (183 lb)   LMP  (LMP Unknown)   BMI 33.47 kg/m²     Assessment/Plan   Diagnoses and all orders for this visit:    Urine frequency  -     POC Urinalysis Dipstick, Automated  -     Urine Culture - Urine, Urine, Clean Catch    Callous ulcer, limited to breakdown of skin (CMS/HCC)  -     Ambulatory Referral to Wound Clinic    Acute cystitis without hematuria    Other orders  -     cephalexin (Keflex) 500 MG capsule; Take 1 capsule by mouth 2 (Two) Times a Day.        Patient w/ uti. Will treat empyrically with " keflex. Will also be beneficial for foot wound. She is to go to wound care clinic for best way to heal ulcerative callous formation. She is to go to her prosthetic specialist to adjust her brace. She has some decreased mood but declines cymbalta adjustment. To continue support group for this. Will also f/u w/ her foot specialist. She will f/u here in may as scheduled or sooner if needed.

## 2021-03-11 ENCOUNTER — APPOINTMENT (OUTPATIENT)
Dept: WOUND CARE | Facility: HOSPITAL | Age: 61
End: 2021-03-11

## 2021-03-24 ENCOUNTER — TELEPHONE (OUTPATIENT)
Dept: INTERNAL MEDICINE | Facility: CLINIC | Age: 61
End: 2021-03-24

## 2021-03-24 ENCOUNTER — OFFICE VISIT (OUTPATIENT)
Dept: INTERNAL MEDICINE | Facility: CLINIC | Age: 61
End: 2021-03-24

## 2021-03-24 VITALS
BODY MASS INDEX: 34.23 KG/M2 | DIASTOLIC BLOOD PRESSURE: 93 MMHG | RESPIRATION RATE: 18 BRPM | SYSTOLIC BLOOD PRESSURE: 142 MMHG | TEMPERATURE: 97.3 F | HEART RATE: 102 BPM | OXYGEN SATURATION: 97 % | HEIGHT: 62 IN | WEIGHT: 186 LBS

## 2021-03-24 DIAGNOSIS — R05.9 COUGH: Primary | ICD-10-CM

## 2021-03-24 PROBLEM — Z98.890 S/P ORIF (OPEN REDUCTION INTERNAL FIXATION) FRACTURE: Status: RESOLVED | Noted: 2017-12-21 | Resolved: 2021-03-24

## 2021-03-24 PROBLEM — Z87.81 S/P ORIF (OPEN REDUCTION INTERNAL FIXATION) FRACTURE: Status: RESOLVED | Noted: 2017-12-21 | Resolved: 2021-03-24

## 2021-03-24 PROBLEM — I26.99 ACUTE PULMONARY EMBOLISM (HCC): Status: ACTIVE | Noted: 2017-10-21

## 2021-03-24 PROBLEM — C79.49 METASTASIS TO SPINAL CORD: Status: ACTIVE | Noted: 2017-10-10

## 2021-03-24 PROBLEM — F39 MOOD DISORDER: Status: ACTIVE | Noted: 2017-10-27

## 2021-03-24 PROCEDURE — 99213 OFFICE O/P EST LOW 20 MIN: CPT | Performed by: NURSE PRACTITIONER

## 2021-03-24 RX ORDER — PSEUDOEPHEDRINE HCL 30 MG
30 TABLET ORAL EVERY 6 HOURS PRN
Qty: 20 TABLET | Refills: 0 | Status: SHIPPED | OUTPATIENT
Start: 2021-03-24

## 2021-03-24 RX ORDER — AMOXICILLIN AND CLAVULANATE POTASSIUM 875; 125 MG/1; MG/1
1 TABLET, FILM COATED ORAL 2 TIMES DAILY
Qty: 20 TABLET | Refills: 0 | Status: SHIPPED | OUTPATIENT
Start: 2021-03-24 | End: 2021-03-29

## 2021-03-24 RX ORDER — METAXALONE 800 MG/1
800 TABLET ORAL 3 TIMES DAILY PRN
Qty: 30 TABLET | Refills: 0 | Status: SHIPPED | OUTPATIENT
Start: 2021-03-24 | End: 2021-09-20 | Stop reason: SDUPTHER

## 2021-03-24 NOTE — PROGRESS NOTES
Subjective   Martha Davey is a 60 y.o. female.   Chief Complaint   Patient presents with   • Earache   • Sinus Problem     Vitals:    03/24/21 1558   BP: 142/93   Pulse: 102   Resp: 18   Temp: 97.3 °F (36.3 °C)   SpO2: 97%     No LMP recorded (lmp unknown). Patient has had a hysterectomy.    Martha is a 60 year old female patient of Dr Chanel who is here for an acute visit she c/o ear ache for a few days. A cough for a few days, and sinus pressure and congestion for 2 months.   She is also requesting a refill on metaxalone     Earache   There is pain in the left ear. This is a new problem. The current episode started in the past 7 days. The problem has been unchanged. There has been no fever. The pain is mild. Associated symptoms include coughing, headaches and rhinorrhea. Pertinent negatives include no ear discharge or hearing loss. Her past medical history is significant for a tympanostomy tube. There is no history of hearing loss.   Sinus Problem  This is a new problem. The current episode started more than 1 month ago. The problem is unchanged. There has been no fever. The pain is mild. Associated symptoms include congestion, coughing, ear pain, headaches and sinus pressure. Pertinent negatives include no shortness of breath. Past treatments include oral decongestants (had to stop sudafed due to not being able to get it through the drive through at [facility] ).   she denies any exposure to covid. She had the first covid vaccine and is due for the second this week     The following portions of the patient's history were reviewed and updated as appropriate: allergies, current medications, past family history, past medical history, past social history, past surgical history and problem list.    Review of Systems   Constitutional: Positive for fatigue. Negative for fever.   HENT: Positive for congestion, ear pain, rhinorrhea and sinus pressure. Negative for ear discharge and hearing loss.    Respiratory: Positive  for cough. Negative for shortness of breath.    Cardiovascular: Negative.    Musculoskeletal: Positive for arthralgias.   Neurological: Positive for headaches.       Objective   Physical Exam  Vitals reviewed.   Constitutional:       General: She is awake. She is not in acute distress.     Appearance: Normal appearance. She is well-developed and well-groomed.   HENT:      Right Ear: Tympanic membrane and ear canal normal.      Left Ear: No swelling. A PE tube is present. Tympanic membrane is not injected.      Nose: Mucosal edema and rhinorrhea present.      Right Sinus: Maxillary sinus tenderness and frontal sinus tenderness present.      Left Sinus: Maxillary sinus tenderness and frontal sinus tenderness present.   Eyes:      General: Lids are normal.   Cardiovascular:      Rate and Rhythm: Normal rate and regular rhythm.   Pulmonary:      Effort: Pulmonary effort is normal.      Breath sounds: Normal breath sounds.   Neurological:      Mental Status: She is alert.         Assessment/Plan   Diagnoses and all orders for this visit:    1. Cough (Primary)  -     Cancel: COVID-19,[facility],NP/OP Swab in Transport Media or ESwab 72 HR TAT - Swab, Anterior nasal; Future  -     COVID-19,[facility] ROUTINE, NP/OP SWAB IN TRANSPORT MEDIA OR ESWAB 72 HR TAT - Swab, Nasopharynx    Other orders  -     metaxalone (Skelaxin) 800 MG tablet; Take 1 tablet by mouth 3 (Three) Times a Day As Needed for Muscle Spasms.  Dispense: 30 tablet; Refill: 0  -     pseudoephedrine (Sudafed) 30 MG tablet; Take 1 tablet by mouth Every 6 (Six) Hours As Needed for Congestion.  Dispense: 20 tablet; Refill: 0  -     amoxicillin-clavulanate (Augmentin) 875-125 MG per tablet; Take 1 tablet by mouth 2 (Two) Times a Day.  Dispense: 20 tablet; Refill: 0      Will do a covid test and call with results since she is on oral chemo, she denies any exposure that she knows of  Start augmentin  Sinus rinses with saline  rx for sudafed to take as needed for  congestion advised to monitor blood pressure while taking it and not recommended for long term use due to h/o HTN  Metaxalone refilled per patient request  Follow up if symptoms persists, worsens or if new symptoms develop

## 2021-03-24 NOTE — TELEPHONE ENCOUNTER
PATIENT CALLED AND STATED SHE IS HAVING PAIN IN HER LEFT EAR. PATIENT WAS JUST IN A FEW WEEKS AGO AND DOES NOT WANT TO COME IN IF POSSIBLE. PATIENT IS REQUESTING EAR DROPS BE SENT TO    [facility] - [address] - [phone] [facility] [phone] FX       PLEASE ADVISE     [phone]

## 2021-03-25 ENCOUNTER — OFFICE VISIT (OUTPATIENT)
Dept: PSYCHIATRY | Facility: HOSPITAL | Age: 61
End: 2021-03-25

## 2021-03-25 DIAGNOSIS — F41.1 GENERALIZED ANXIETY DISORDER: ICD-10-CM

## 2021-03-25 DIAGNOSIS — F33.1 MAJOR DEPRESSIVE DISORDER, RECURRENT EPISODE, MODERATE (HCC): Primary | ICD-10-CM

## 2021-03-25 PROCEDURE — 99214 OFFICE O/P EST MOD 30 MIN: CPT | Performed by: NURSE PRACTITIONER

## 2021-03-25 NOTE — PROGRESS NOTES
[facility]  In Person Session    Subjective  Patient ID: Martha Davey is a 60 y.o. female is seen face to face in the [facility].    CC: Overwhelm, little time for self    HPI:  Pt noted to be alert, oriented, and engaged in conversation. Affect and mood remains overwhelmed.  Pt continues to acknowledge good and bad days. Continues to manage various needs and challenges of others in her life, with hyper focus on others. Explored gratitude for meeting with  Dale, and discusses intention to utilize routine to assist with prioritizing short and long list of daily demands. Finds it somewhat challenging to even consider the things she enjoys, let alone prioritize self care opportunities.   Pt reviews general perception of chaos in life. Finds office is a disaster and she cannot find time to manage all her neessary tasks. Sleep is described as being difficult to initiate and occasionally fragmented due to ruminative worry. Medications reviewed; pt conitnues to take cymbalta 30 mg bid and feels this is helpful. Gabapentin reviewed and confirmed as taking 300-600 mg daily. Often forgetting the dinner dose, which she agrees is helpful for initiating sleep when remembers to take.  Pt reports continued intrsusiveness of QOL with current pandemic and risk related to variants. Misses socialization, although continues to talk with friends. Continues to acknowledge challenges within political climate, specifically in her family. Continues to work toward establishing boundaries.  Pt does remain connected to [facility]'s Support group with appreciation of ability to offer support and care to others. Additionally reports appreciation of time away from tasks and desire to garden and spend time outdoors.    Review of Systems   Constitutional: Positive for fatigue.   Psychiatric/Behavioral: Positive for sleep disturbance.     Medications Reviewed:  Cymbtalta 30 mg bid  Gabapentin  300 mg bid per outside provider    Diagnoses and all orders for this visit:    1. Major depressive disorder, recurrent episode, moderate (CMS/HCC) (Primary)    2. Generalized anxiety disorder    Plan of Care  Patient continues with stable management of mood and anxiety symptoms on meds admits chaotic social situation with significant responsibility toward others.  Continued to review benefits of routine and schedule for simplification of demands and incorporation of self-care opportunities.  Reviewed existing schemas of not being allowed to care for oneself and explored opportunities to insert 10 to 30 minutes of self-directed focus into her day.  Sleep hygiene techniques reviewed especially to include benefits of physical activity and wind down routine.  Explored benefits toward taking gabapentin as directed.  Did encourage patient to attend current programming in clinic and continue with [employer], which patient plans to do.  Follow-up arranged in 6 weeks.    Total time spent in patient encounter: 35 minutes

## 2021-03-26 ENCOUNTER — TELEPHONE (OUTPATIENT)
Dept: INTERNAL MEDICINE | Facility: CLINIC | Age: 61
End: 2021-03-26

## 2021-03-26 LAB
LABCORP SARS-COV-2, NAA 2 DAY TAT: NORMAL
SARS-COV-2 RNA RESP QL NAA+PROBE: NOT DETECTED

## 2021-03-26 NOTE — TELEPHONE ENCOUNTER
----- Message from LISA Domingo sent at 3/26/2021  7:48 AM EDT -----  Please notify patient of negative result,

## 2021-03-27 NOTE — PROGRESS NOTES
"Chief Complaint  Previous Stage Ib ([id number]) ER/MA positive, HER-2/peter negative right breast cancer with subsequent metastatic disease identified 10/8/2017, history of right pulmonary embolism, cancer related pain, chemotherapy-induced diarrhea, chemotherapy-induced mucositis, chemotherapy-induced hand-foot syndrome    Subjective        History of Present Illness   Martha is seen back today in 4-week follow-up.  She continues on Xeloda 1500 mg twice a day for 7 days on, 7 days off, currently on therapy.  She continues with fair tolerance.  She does have grade 2 hand-foot syndrome which she is managing with frequent use of emollient creams.  She also has triamcinolone 1% cream to use during her on week of therapy.    Patient was seen by primary care last week for pain in her left ear and sinus pressure.  She was started on Augmentin but only completed 2 days of therapy before developing significant diarrhea requiring her to discontinue Augmentin.  She does note improvement in her left ear pain and sinus pressure but has continued dry cough.  She is using Mucinex currently.    She otherwise denies any new concerns today.    Objective   Vital Signs:   /79   Pulse 109   Temp 96.9 °F (36.1 °C) (Temporal)   Resp 18   Ht 157.5 cm (62.01\")   Wt 84.2 kg (185 lb 11.2 oz)   SpO2 95%   BMI 33.96 kg/m²     Physical Exam  Constitutional:       Appearance: She is well-developed.   Eyes:      Conjunctiva/sclera: Conjunctivae normal.   Neck:      Thyroid: No thyromegaly.   Cardiovascular:      Rate and Rhythm: Normal rate and regular rhythm.      Heart sounds: No murmur heard.   No friction rub. No gallop.    Pulmonary:      Effort: No respiratory distress.      Breath sounds: Normal breath sounds.   Abdominal:      General: Bowel sounds are normal. There is no distension.      Palpations: Abdomen is soft.      Tenderness: There is no abdominal tenderness.   Lymphadenopathy:      Head:      Right side of head: No " submandibular adenopathy.      Cervical: No cervical adenopathy.      Upper Body:      Right upper body: No supraclavicular adenopathy.      Left upper body: No supraclavicular adenopathy.   Skin:     General: Skin is warm and dry.      Findings: Rash present.      Comments: There is significant bilateral palmar erythema, on the right hand there is extension onto the dorsal aspect of the hand.  Stable.   Neurological:      Mental Status: She is alert and oriented to person, place, and time.      Cranial Nerves: No cranial nerve deficit.      Motor: No abnormal muscle tone.      Deep Tendon Reflexes: Reflexes normal.   Psychiatric:         Behavior: Behavior normal.        I have reexamined the patient and the results are consistent with the previously documented exam. She Sears, APRN       Result Review :  Results from last 7 days   Lab Units 03/29/21  1041   WBC 10*3/mm3 3.69   NEUTROS ABS 10*3/mm3 2.29   HEMOGLOBIN g/dL 13.3   HEMATOCRIT % 39.9   PLATELETS 10*3/mm3 211     Results from last 7 days   Lab Units 03/29/21  1041   SODIUM mmol/L 141   POTASSIUM mmol/L 4.5   CHLORIDE mmol/L 104   CO2 mmol/L 29.4*   BUN mg/dL 15   CREATININE mg/dL 0.90   CALCIUM mg/dL 8.9   ALBUMIN g/dL 4.10   BILIRUBIN mg/dL 0.4   ALK PHOS U/L 68   ALT (SGPT) U/L 21   AST (SGOT) U/L 30   GLUCOSE mg/dL 198*   MAGNESIUM mg/dL 2.2                Assessment and Plan     1. Previous Stage Ib ([id number]) right breast cancer:  · Diagnosed May 2010 with excisional biopsy for invasive ductal carcinoma, 1.3 cm, grade 2, ER 90%, DE 80%, HER-2/peter negative (1+ IHC).    · Subsequent right mastectomy in July 2010 with no residual breast malignancy, 1/5 sentinel lymph node with micrometastasis (0.25 mm).    · Treated in the [facility] system with adjuvant AC ×4 cycles in 2010 (no taxanes administered due to underlying Charcot-Saloni-Tooth with peripheral neuropathy).    · Adjuvant Femara (postmenopausal) initiated October 2010 with plan to  treat ×10 years.    · Genetic testing reportedly negative.    · Developed osteopenia treated with Prolia beginning 2/27/13. Subsequently discontinued due to identification of metastatic disease.  2. Recurrent/metastatic disease identified 10/8/17:  · Disease involving thoracic spine with cord compression at T6, lumbosacral involvement, sternal and right sternoclavicular involvement.    · Femara discontinued in 10/2017.    · Radiation administered (in the [facility] system) to the thoracic spine beginning 10/19/17 treating T3-T9 to a dose of 24 gray in 6 fractions.  · Evaluation with MRI 12/8/17 showing persistent T6 cord compression with persistent neurologic compromise requiring surgical treatment 12/11/17 with T6 laminectomy/corpectomy and T3-T9 fusion.  Pathology with metastatic carcinoma of breast origin, ER negative, SD negative, HER-2/peter negative (1+ IHC).  · Additional staging evaluation 12/8/17 with no evidence of visceral metastatic disease, bone scan showing involvement of thoracic spine, sternum, left humerus, mid frontal bone.  No plane film correlate of left humerus lesion.  MRI lumbar spine with small intradural L3 metastasis.  CA 15-3 12/6/17- 17.  · Palliative radiation therapy to L3 dural metastasis and left humerus initiated 1/15/18 (10 fractions), completed 1/26/18.  · Hypercalcemia of malignancy with calcium in the 10-11 range.  · Initiation of monthly Xgeva 1/23/18.  · Baseline CT scan 1/30/18 with no evidence of visceral involvement.  Cluster of nodular opacities in the right lower lobe suspected to be infectious or related to bronchiolitis. Bone scan 1/30/18 showed postsurgical change in the thoracic spine, stable uptake in the frontal bone, no new areas of disease.  · Initiation of palliative oral single agent Xeloda 2/7/18 2000 mg a.m., 1500 mg p.m. for 14/21 days.   · Following 3 cycles xeloda, bone scan 4/4/18 showed no change from the prior study.  CT scan 4/4/18 showed a small pericardial  effusion of unclear significance as well as a subcutaneous nodule in the right lateral chest wall.  Subsequent evaluation with echocardiogram 4/17/18 showed no evidence of pericardial effusion.  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  We discussed the possibility of surgical excision of the right subcutaneous chest wall lesion for more definitive diagnosis.  Reviewed previous CT images dating back to 12/8/17 and the lesion was present even at that time measuring around 1.7 cm although not commented on in the radiology report.  As this appears to be an indolent low-grade process unrelated to her breast cancer, recommendee foregoing surgical excision at this time and monitoring this area on future scans.  The patient agreed.    · Following 6 cycles of Xeloda, CT 6/6/18  showed stable findings, no evidence of progressive disease.  There was a comment regarding subcutaneous abnormality in the anterior abdominal wall and this was related to Lovenox injection sites.  Bone scan 6/6/18 showed no interval change.   · CT scan and bone scan 8/13/18 following 9 cycles of Xeloda showed stable findings with no evidence of significant visceral metastases.  Her bone lesions appear stable on bone scan.  The spindle cell neoplasm in her right chest wall actually decreased in size from 2 cm down to 1.6 cm.    · The patient experienced some symptoms of diarrhea, anorexia, generalized weakness during cycle 9 Xeloda it was unclear whether this was related to a viral gastroenteritis or toxicity from treatment.  Symptoms recurred during cycle 10 and treatment was cut short by 2 days.  Symptoms attributed to Xeloda.  With cycle 11, dose and schedule altered to 1500 mg twice daily for 7 days on followed by 7 days off .  · CT scan 9/9/2020 with no significant changes.  Bone scan 9/9/2020 read as  unchanged from prior studies however did note an area of slight activity in the medial left femur.  Contacted radiology and although this was not noted on prior reports appears to have been present.  Subsequent MRI left femur 9/21/2020 with cortical thickening and periosteal edema left iliac us muscle insertion to the medial left femur with no evidence of metastatic disease, favored to represent periosteal change secondary to insertional tendinitis.  · Most recent 3-month interval scans from 2/23/2021 with CT chest abdomen pelvis and bone scan showing stable findings.  · The patient returns today continuing on Xeloda 1500 mg twice a day for 7 days on, 7 days off.  She is currently on therapy.  Tolerating well overall with relative stability at this time of hand-foot syndrome.  Plan to repeat CT and bone scan at 3-month interval.  She will proceed with scheduled Xgeva today and otherwise return in 1 month for follow-up with Dr. Hancock and Silvanava.  3. Right pulmonary embolism:  · Diagnosed on CT angiogram 10/21/17 involving small right lower lobe pulmonary artery.  Lower extremity Dopplers negative.  · Bilateral lower extremity Dopplers negative again 12/5/17.  · Received chronic Lovenox 1 mg/kg twice per day, transition to oral Eliquis in February 2019, continuing on Eliquis 5 mg twice daily.  4. Cancer related pain:  · Previously receiving Duragesic 50 µg patch every 72 hours along with Dilaudid 4 mg as needed for breakthrough pain  · The patient's pain improved over time and she was able to discontinue both Duragesic and Dilaudid in the interval.  · Patient does take occasional Flexeril at bedtime due to back spasm/pain when she has been more active.  · The patient does have some occasional aggravation of her chronic back pain and does use tramadol 50 mg every 8 hours as needed  · Patient's pain is stable.  She does continue to use tramadol 50 mg every 12 hours as needed which has been  effective.  5. Chemotherapy-induced diarrhea with subsequent C. difficile colitis in the setting of previous ulcerative colitis:  · Patient with reported history of ulcerative colitis, is not on active therapy.  · The patient developed initial diarrhea related to Xeloda at regular dosing.  · Symptoms improved on reduced dose Xeloda  · Flare of symptoms in October 2018 with apparent finding of C. difficile colitis by GI, treated with course of oral vancomycin with improvement in symptoms.  · Patient notes minimal intermittent diarrhea on Xeloda requiring occasional dosing of Imodium.    · No recent treatment related diarrhea.  She did have significant diarrhea x2 days related to Augmentin but this subsided with discontinuation of antibiotic.  6. Traumatic left tibia/fibular fracture:  · Status post ORIF 12/6/17  · Specimen was sent for pathologic review, negative for evidence of malignancy  7. Hypercalcemia:  · Suspect hypercalcemia of malignancy, calcium in  10-11 range previously.  · Calcium normalized following initiation of monthly Xgeva on 1/23/18.  8. Chemotherapy-induced mucositis:  · Patient had a minimal degree of mucositis with cycle 2.  The patient has magic mouthwash to use as needed.  No subsequent mucositis.  9. Recurrent UTI, bladder wall thickening on CT:  · Patient had an enterococcal UTI on 3/2/18 sensitive to nitrofurantoin and received treatment, unclear how long.  · Recurrent UTI 3/20/18 with urine culture growing Klebsiella, initially treated with nitrofurantoin, transitioned to Levaquin.  · CT 4/4/18 with diffuse bladder wall thickening with increased nodular thickening at the left base.  Referral to urogynecology Dr. May Johnson.  She was placed on a prophylactic dose of trimethoprim 100 mg daily, bladder wall thickening felt to be related to recent recurrent urinary tract infections.  · Patient with urinary symptoms, treated with course of Macrobid at the end of December 2018, urine culture  however was negative 12/31/18.  · Patient was found to have Klebsiella UTI 7/29/2019 which was successfully treated with Macrobid with complete resolution of symptoms.  10.   Mobility:  · The patient underwent an intensive course of rehabilitation at [facility].  She graduated from her outpatient course November 2018.  · Overall the patient has improved dramatically in terms of mobility, now walking around 1 mile per day without assistance.  11.  Depression:  · The patient is continuing follow-up in the supportive oncology clinic and is currently continuing on Cymbalta 30 mg twice daily as well as gabapentin 300 mg twice daily.  · The patient feels that her depression symptoms are currently fairly well controlled.  She has seen some benefit from attending support groups at [employer] which she plans to continue.  12. 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  Symptoms did improve and she continued on the same dose.    · Patient was referred to dermatology and has been continuing on triamcinolone 0.1% cream used for 1 week on followed by 1 week off which led to some further improvement.   · Today, symptoms are stable with continued use of triamcinolone per dermatology recommendations.  Patient feels symptoms are acceptable to continue therapy.   13. Evidence of steatosis on scans with mild intermittent elevated liver function studies:  · Liver function studies increased 8/20/19 with ALT 98, AST 70, normal total bilirubin.  · Negative viral hepatitis A, B, and C panel 8/23/18  · Likely related to hepatic steatosis.   · Subsequent improvement in LFTs  · Today, LFTs normal  14. Chemotherapy induced leukopenia:  · WBC today 3.6 with normal differential  15. GERD:  · The patient continues on omeprazole 40 mg daily (2 x 20 mg).  · The patient  also continues to use Carafate 1 g 4 times daily  · Symptoms currently stable  16. Insomnia:  · Patient with prior paradoxical reaction to Benadryl  · Improved previously on temazepam 15 mg nightly as needed.   · Patient noted subsequently that temazepam was having no effect.   · Symptoms currently stable on gabapentin 300 mg in the evening and 300 mg at night  17. Health maintenance:  · Patient notes that she has a history of colon polyps as well as ulcerative colitis and was due for a follow-up colonoscopy on 9/12/2019.  We did discuss there is no necessity to pursue colonoscopy in the setting of her metastatic breast cancer.    · The patient did undergo colonoscopy on 2/7/2020 with findings of muscular hypertrophy and diverticulosis.   18. Right shoulder pain:  · Patient was evaluated by Dr Forrester.  She has experienced difficulty over the past year and a half with her right shoulder although she has not complained of this in prior visits to our office.  She reports difficulty with abduction.    · The patient did undergo MRI of her right shoulder on 11/21/2019 at an outside facility showing multiple abnormalities including supraspinatus tendinosis, labral tear but no evidence of metastatic disease.  · Symptoms currently improved/resolved.  19. Ocular changes in part related to Xeloda:  · Patient experienced a mild degree of blurred vision as well as burning and pruritus.  · She was seen by her ophthalmologist and has been continuing on xiidra ophthalmic drops which have helped.  · Likely both issues are to some extent related to Xeloda.  · Symptoms currently stable to improved.     Plan:  1. Continue oral Xeloda 1500 mg twice a day 7 days on followed by 7 days off.  2. Monthly Xgeva today  3. Continue Eliquis 5 mg twice daily.  4. Continue use of emollient cream on the hands and feet and continue to wear socks and gloves at night as well as use of triamcinolone cream as prescribed by dermatology (1 week on  followed by 1 week off).  5. Continue omeprazole 40 mg daily and Carafate 1 g 4 times daily.    6. Continue xiidra ophthalmic drops prescribed by ophthalmology  7. In 4 weeks MD visit with CBC, CMP, magnesium, phosphorus and patient will be due for Xgeva  8. In 7 weeks CT chest abdomen and pelvis and bone scan  9. In 8 weeks MD visit with CBC, CMP, magnesium, phosphorus and Xgeva.  We will review scan results.     Patient continuing on high risk medication requiring intensive monitoring.

## 2021-03-29 ENCOUNTER — OFFICE VISIT (OUTPATIENT)
Dept: ONCOLOGY | Facility: CLINIC | Age: 61
End: 2021-03-29

## 2021-03-29 ENCOUNTER — SPECIALTY PHARMACY (OUTPATIENT)
Dept: ONCOLOGY | Facility: HOSPITAL | Age: 61
End: 2021-03-29

## 2021-03-29 ENCOUNTER — TELEPHONE (OUTPATIENT)
Dept: INTERNAL MEDICINE | Facility: CLINIC | Age: 61
End: 2021-03-29

## 2021-03-29 ENCOUNTER — LAB (OUTPATIENT)
Dept: OTHER | Facility: HOSPITAL | Age: 61
End: 2021-03-29

## 2021-03-29 ENCOUNTER — INFUSION (OUTPATIENT)
Dept: ONCOLOGY | Facility: HOSPITAL | Age: 61
End: 2021-03-29

## 2021-03-29 VITALS
BODY MASS INDEX: 34.17 KG/M2 | SYSTOLIC BLOOD PRESSURE: 152 MMHG | HEART RATE: 109 BPM | DIASTOLIC BLOOD PRESSURE: 79 MMHG | OXYGEN SATURATION: 95 % | RESPIRATION RATE: 18 BRPM | TEMPERATURE: 96.9 F | WEIGHT: 185.7 LBS | HEIGHT: 62 IN

## 2021-03-29 DIAGNOSIS — C79.51 BONE METASTASES: ICD-10-CM

## 2021-03-29 DIAGNOSIS — Z17.1 MALIGNANT NEOPLASM OF OVERLAPPING SITES OF RIGHT BREAST IN FEMALE, ESTROGEN RECEPTOR NEGATIVE (HCC): ICD-10-CM

## 2021-03-29 DIAGNOSIS — Z17.1 MALIGNANT NEOPLASM OF OVERLAPPING SITES OF RIGHT BREAST IN FEMALE, ESTROGEN RECEPTOR NEGATIVE (HCC): Primary | ICD-10-CM

## 2021-03-29 DIAGNOSIS — C50.811 MALIGNANT NEOPLASM OF OVERLAPPING SITES OF RIGHT BREAST IN FEMALE, ESTROGEN RECEPTOR NEGATIVE (HCC): ICD-10-CM

## 2021-03-29 DIAGNOSIS — C50.811 MALIGNANT NEOPLASM OF OVERLAPPING SITES OF RIGHT BREAST IN FEMALE, ESTROGEN RECEPTOR NEGATIVE (HCC): Primary | ICD-10-CM

## 2021-03-29 DIAGNOSIS — Z79.899 HIGH RISK MEDICATION USE: ICD-10-CM

## 2021-03-29 LAB
ALBUMIN SERPL-MCNC: 4.1 G/DL (ref 3.5–5.2)
ALBUMIN/GLOB SERPL: 1.4 G/DL
ALP SERPL-CCNC: 68 U/L (ref 39–117)
ALT SERPL W P-5'-P-CCNC: 21 U/L (ref 1–33)
ANION GAP SERPL CALCULATED.3IONS-SCNC: 7.6 MMOL/L (ref 5–15)
AST SERPL-CCNC: 30 U/L (ref 1–32)
BASOPHILS # BLD AUTO: 0.04 10*3/MM3 (ref 0–0.2)
BASOPHILS NFR BLD AUTO: 1.1 % (ref 0–1.5)
BILIRUB SERPL-MCNC: 0.4 MG/DL (ref 0–1.2)
BUN SERPL-MCNC: 15 MG/DL (ref 8–23)
BUN/CREAT SERPL: 16.7 (ref 7–25)
CALCIUM SPEC-SCNC: 8.9 MG/DL (ref 8.6–10.5)
CHLORIDE SERPL-SCNC: 104 MMOL/L (ref 98–107)
CO2 SERPL-SCNC: 29.4 MMOL/L (ref 22–29)
CREAT SERPL-MCNC: 0.9 MG/DL (ref 0.57–1)
DEPRECATED RDW RBC AUTO: 60.2 FL (ref 37–54)
EOSINOPHIL # BLD AUTO: 0.25 10*3/MM3 (ref 0–0.4)
EOSINOPHIL NFR BLD AUTO: 6.8 % (ref 0.3–6.2)
ERYTHROCYTE [DISTWIDTH] IN BLOOD BY AUTOMATED COUNT: 16.4 % (ref 12.3–15.4)
GFR SERPL CREATININE-BSD FRML MDRD: 64 ML/MIN/1.73
GLOBULIN UR ELPH-MCNC: 2.9 GM/DL
GLUCOSE SERPL-MCNC: 198 MG/DL (ref 65–99)
HCT VFR BLD AUTO: 39.9 % (ref 34–46.6)
HGB BLD-MCNC: 13.3 G/DL (ref 12–15.9)
IMM GRANULOCYTES # BLD AUTO: 0.02 10*3/MM3 (ref 0–0.05)
IMM GRANULOCYTES NFR BLD AUTO: 0.5 % (ref 0–0.5)
LYMPHOCYTES # BLD AUTO: 0.7 10*3/MM3 (ref 0.7–3.1)
LYMPHOCYTES NFR BLD AUTO: 19 % (ref 19.6–45.3)
MAGNESIUM SERPL-MCNC: 2.2 MG/DL (ref 1.6–2.4)
MCH RBC QN AUTO: 33.2 PG (ref 26.6–33)
MCHC RBC AUTO-ENTMCNC: 33.3 G/DL (ref 31.5–35.7)
MCV RBC AUTO: 99.5 FL (ref 79–97)
MONOCYTES # BLD AUTO: 0.39 10*3/MM3 (ref 0.1–0.9)
MONOCYTES NFR BLD AUTO: 10.6 % (ref 5–12)
NEUTROPHILS NFR BLD AUTO: 2.29 10*3/MM3 (ref 1.7–7)
NEUTROPHILS NFR BLD AUTO: 62 % (ref 42.7–76)
NRBC BLD AUTO-RTO: 0 /100 WBC (ref 0–0.2)
PHOSPHATE SERPL-MCNC: 2.8 MG/DL (ref 2.5–4.5)
PLATELET # BLD AUTO: 211 10*3/MM3 (ref 140–450)
PMV BLD AUTO: 10.1 FL (ref 6–12)
POTASSIUM SERPL-SCNC: 4.5 MMOL/L (ref 3.5–5.2)
PROT SERPL-MCNC: 7 G/DL (ref 6–8.5)
RBC # BLD AUTO: 4.01 10*6/MM3 (ref 3.77–5.28)
SODIUM SERPL-SCNC: 141 MMOL/L (ref 136–145)
WBC # BLD AUTO: 3.69 10*3/MM3 (ref 3.4–10.8)

## 2021-03-29 PROCEDURE — 84100 ASSAY OF PHOSPHORUS: CPT | Performed by: INTERNAL MEDICINE

## 2021-03-29 PROCEDURE — 25010000002 DENOSUMAB 120 MG/1.7ML SOLUTION: Performed by: NURSE PRACTITIONER

## 2021-03-29 PROCEDURE — 80053 COMPREHEN METABOLIC PANEL: CPT | Performed by: INTERNAL MEDICINE

## 2021-03-29 PROCEDURE — 36415 COLL VENOUS BLD VENIPUNCTURE: CPT

## 2021-03-29 PROCEDURE — 85025 COMPLETE CBC W/AUTO DIFF WBC: CPT | Performed by: INTERNAL MEDICINE

## 2021-03-29 PROCEDURE — 99214 OFFICE O/P EST MOD 30 MIN: CPT | Performed by: NURSE PRACTITIONER

## 2021-03-29 PROCEDURE — 96372 THER/PROPH/DIAG INJ SC/IM: CPT

## 2021-03-29 PROCEDURE — 83735 ASSAY OF MAGNESIUM: CPT | Performed by: INTERNAL MEDICINE

## 2021-03-29 RX ORDER — SACCHAROMYCES BOULARDII 250 MG
250 CAPSULE ORAL 2 TIMES DAILY
Qty: 20 CAPSULE | Refills: 1 | Status: SHIPPED | OUTPATIENT
Start: 2021-03-29 | End: 2021-10-18

## 2021-03-29 RX ORDER — AZELASTINE 1 MG/ML
2 SPRAY, METERED NASAL DAILY
Qty: 1 EACH | Refills: 12 | Status: SHIPPED | OUTPATIENT
Start: 2021-03-29 | End: 2021-10-18

## 2021-03-29 RX ADMIN — DENOSUMAB 120 MG: 120 INJECTION SUBCUTANEOUS at 11:42

## 2021-03-29 NOTE — PROGRESS NOTES
MT[facility] in person encounter- capecitabine    Ms. Davey is doing well today. She continues on capecitabine 1500 mg 7/14 days. Medication administration and adherence seem appropriate; she missed one dose this past month. Ms. Davey has been using steroid cream for HFS per dermatology and she reports that this has helped tremendously and her hands are not as sore. She had quite a bit of diarrhea with Augmentin that she was recently put on for sinus infection; otherwise, she denies any GI upset. Ms. Davey has no additional questions or concerns for me today.     Thanks,   Lubna Gupta, PharmD

## 2021-03-29 NOTE — TELEPHONE ENCOUNTER
Caller: Martha Davey    Relationship: Self    Best call back number: [phone]    What medication are you requesting: ALTERNATIVE ANTIBIOTIC TO AMOXICILLIN     What are your current symptoms: SEVERE DIARRHEA     How long have you been experiencing symptoms: 3 DAYS    Have you had these symptoms before:    [] Yes  [x] No    Have you been treated for these symptoms before:   [] Yes  [x] No    If a prescription is needed, what is your preferred pharmacy and phone number: [address] [phone] [facility] [phone] [facility]     Additional notes: PATIENT STATED THIS ANTIBIOTIC IS GIVING HER SEVERE DIARRHEA. SHE CAN NOT TAKE IT AND PROCEED WITH HER CANCER TREATMENT. PLEASE FOLLOW UP PATIENT.

## 2021-03-29 NOTE — TELEPHONE ENCOUNTER
Please advise pateint to STOP augmentin. Start florastor 1 tablet 2 times daily. Continue flonase 2 spray each nostril and start astelin 2 spray each nostril once daily. She should call if continued diarrhea or sinus drainage.              LM for pt

## 2021-03-30 ENCOUNTER — MEDICATION THERAPY MANAGEMENT (OUTPATIENT)
Dept: PHARMACY | Facility: HOSPITAL | Age: 61
End: 2021-03-30

## 2021-03-30 ENCOUNTER — TELEPHONE (OUTPATIENT)
Dept: INTERNAL MEDICINE | Facility: CLINIC | Age: 61
End: 2021-03-30

## 2021-03-30 NOTE — PROGRESS NOTES
[facility] Lab Review- Capecitabine      3/29/2021   WBC 3.40 - 10.80 10*3/mm3 3.69   Neutrophils Absolute 1.70 - 7.00 10*3/mm3 2.29   Hemoglobin 12.0 - 15.9 g/dL 13.3   Hematocrit 34.0 - 46.6 % 39.9   Platelets 140 - 450 10*3/mm3 211   Creatinine 0.57 - 1.00 mg/dL 0.90   eGFR Non African Am >60 mL/min/1.73 64   BUN 8 - 23 mg/dL 15   Sodium 136 - 145 mmol/L 141   Potassium 3.5 - 5.2 mmol/L 4.5   Glucose 65 - 99 mg/dL 198 (A)   Magnesium 1.6 - 2.4 mg/dL 2.2   Calcium 8.6 - 10.5 mg/dL 8.9   Albumin 3.50 - 5.20 g/dL 4.10   Total Protein 6.0 - 8.5 g/dL 7.0   AST (SGOT) 1 - 32 U/L 30   ALT (SGPT) 1 - 33 U/L 21   Alkaline Phosphatase 39 - 117 U/L 68   Total Bilirubin 0.0 - 1.2 mg/dL 0.4   Phosphorus 2.5 - 4.5 mg/dL 2.8     APRN dictation noted. Pharmacy will continue to follow.     Thanks,   Lubna Gupta, PharmD

## 2021-03-30 NOTE — TELEPHONE ENCOUNTER
Patient return your call and stated that she got the other medication ok except for the new antibiotic. She said she would like for you to call it in to her pharmacy phone # [phone].     I ask the name of the antibiotic medication and she does not know the name of it but said you sent it in yesterday.     Best call back # [phone]

## 2021-04-05 RX ORDER — TEMAZEPAM 15 MG/1
15 CAPSULE ORAL NIGHTLY PRN
Qty: 90 CAPSULE | OUTPATIENT
Start: 2021-04-05

## 2021-04-16 ENCOUNTER — TELEPHONE (OUTPATIENT)
Dept: ONCOLOGY | Facility: CLINIC | Age: 61
End: 2021-04-16

## 2021-04-16 RX ORDER — TEMAZEPAM 15 MG/1
15 CAPSULE ORAL NIGHTLY PRN
Qty: 90 CAPSULE | Refills: 0 | Status: SHIPPED | OUTPATIENT
Start: 2021-04-16 | End: 2021-07-16

## 2021-04-16 RX ORDER — TRAMADOL HYDROCHLORIDE 50 MG/1
TABLET ORAL
Qty: 90 TABLET | Refills: 0 | Status: SHIPPED | OUTPATIENT
Start: 2021-04-16 | End: 2021-06-02

## 2021-04-16 NOTE — TELEPHONE ENCOUNTER
Returning call to pt. Pt requesting refills on both her tramadol and Restoril. Pt stated she has been having more trouble sleeping lately and has been having back pain. Pt stated she has been trying melatonin, but it doesn't work for her. Told pt I would send refills to Dr. Hancock to sign. Pt v/u.

## 2021-04-22 NOTE — PROGRESS NOTES
Chief Complaint  Previous Stage Ib ([id number]) ER/NV positive, HER-2/peter negative right breast cancer with subsequent metastatic disease identified 10/8/2017, history of right pulmonary embolism, cancer related pain, chemotherapy-induced diarrhea, chemotherapy-induced mucositis, chemotherapy-induced hand-foot syndrome    Subjective        History of Present Illness  The patient returns today in follow-up continuing on oral Xeloda 1500 mg twice daily for 7 days on followed by 7 days off as well as monthly Xgeva and Eliquis 5 mg twice daily.  Patient today reports that she has done reasonably well since last visit.  She did develop some left ear pain and was seen eventually by ENT and had removal of a myringotomy tube that had been in place for a prolonged period of time.  Her pain is improved at this point after a course of Ciprodex.  She did undergo her COVID-19 injection (Pfizer) on 3/12 and 4/2/2021 and tolerated this well.  She has noted some persistent minimal diarrhea on Xeloda which is relieved with intermittent use of Imodium.  She did develop a wound on her left foot at the site of her brace after using new braces on her lower extremities recently.  She has been seen in the wound clinic and the area is healing well.  She does continue with severe difficulty regarding hand-foot symptoms on Xeloda.  She is due for follow-up again with dermatology next week.  She feels that her symptoms are stable and tolerable.  She continues on anticoagulation with Eliquis 5 mg twice daily and has not experienced any bleeding issues.  Reflux is under control on omeprazole and Carafate.  She does note some recent neck pain and headaches.  She has been leaning over a computer for the past few weeks working quite a bit and feels that the pain may be related to this.  She is concerned however and would like to evaluate this further with her upcoming scans in 3 weeks.  She does note anxiety with scans and is requesting a  "prescription for Percocet which she had been taking prior to the scans.  We did discuss that Xanax may be more helpful for the anxiety issue if given only very intermittently.      Objective   Vital Signs:   /69   Pulse 104   Temp 97.7 °F (36.5 °C) (Temporal)   Resp 18   Ht 157.5 cm (62.01\")   Wt 85 kg (187 lb 6.4 oz)   SpO2 96%   BMI 34.27 kg/m²     Physical Exam  Constitutional:       Appearance: She is well-developed.   Eyes:      Conjunctiva/sclera: Conjunctivae normal.   Neck:      Thyroid: No thyromegaly.   Cardiovascular:      Rate and Rhythm: Normal rate and regular rhythm.      Heart sounds: No murmur heard.   No friction rub. No gallop.    Pulmonary:      Effort: No respiratory distress.      Breath sounds: Normal breath sounds.   Abdominal:      General: Bowel sounds are normal. There is no distension.      Palpations: Abdomen is soft.      Tenderness: There is no abdominal tenderness.   Lymphadenopathy:      Head:      Right side of head: No submandibular adenopathy.      Cervical: No cervical adenopathy.      Upper Body:      Right upper body: No supraclavicular adenopathy.      Left upper body: No supraclavicular adenopathy.   Skin:     General: Skin is warm and dry.      Findings: Rash present.      Comments: There is significant bilateral palmar erythema, on the right hand there is extension onto the dorsal aspect of the hand.  There is persistent skin cracking and minor skin peeling.  Overall symptoms are stable compared to last exam.  The patient's feet have only a faint degree of erythema no significant skin peeling noted.  There is a healing wound on the lateral aspect of the left great toe this is being addressed by wound care clinic.   Neurological:      Mental Status: She is alert and oriented to person, place, and time.      Cranial Nerves: No cranial nerve deficit.      Motor: No abnormal muscle tone.      Deep Tendon Reflexes: Reflexes normal.   Psychiatric:         Behavior: " Behavior normal.        Result Review : Reviewed CBC, CMP, magnesium, phosphorus from today       Assessment and Plan     1. Previous Stage Ib ([id number]) right breast cancer:  · Diagnosed May 2010 with excisional biopsy for invasive ductal carcinoma, 1.3 cm, grade 2, ER 90%, FL 80%, HER-2/peter negative (1+ IHC).    · Subsequent right mastectomy in July 2010 with no residual breast malignancy, 1/5 sentinel lymph node with micrometastasis (0.25 mm).    · Treated in the [facility] system with adjuvant AC ×4 cycles in 2010 (no taxanes administered due to underlying Charcot-Saloni-Tooth with peripheral neuropathy).    · Adjuvant Femara (postmenopausal) initiated October 2010 with plan to treat ×10 years.    · Genetic testing reportedly negative.    · Developed osteopenia treated with Prolia beginning 2/27/13. Subsequently discontinued due to identification of metastatic disease.  2. Recurrent/metastatic disease identified 10/8/17:  · Disease involving thoracic spine with cord compression at T6, lumbosacral involvement, sternal and right sternoclavicular involvement.    · Femara discontinued in 10/2017.    · Radiation administered (in the [facility] system) to the thoracic spine beginning 10/19/17 treating T3-T9 to a dose of 24 gray in 6 fractions.  · Evaluation with MRI 12/8/17 showing persistent T6 cord compression with persistent neurologic compromise requiring surgical treatment 12/11/17 with T6 laminectomy/corpectomy and T3-T9 fusion.  Pathology with metastatic carcinoma of breast origin, ER negative, FL negative, HER-2/peter negative (1+ IHC).  · Additional staging evaluation 12/8/17 with no evidence of visceral metastatic disease, bone scan showing involvement of thoracic spine, sternum, left humerus, mid frontal bone.  No plane film correlate of left humerus lesion.  MRI lumbar spine with small intradural L3 metastasis.  CA 15-3 12/6/17- 17.  · Palliative radiation therapy to L3 dural metastasis and left humerus initiated  1/15/18 (10 fractions), completed 1/26/18.  · Hypercalcemia of malignancy with calcium in the 10-11 range.  · Initiation of monthly Xgeva 1/23/18.  · Baseline CT scan 1/30/18 with no evidence of visceral involvement.  Cluster of nodular opacities in the right lower lobe suspected to be infectious or related to bronchiolitis. Bone scan 1/30/18 showed postsurgical change in the thoracic spine, stable uptake in the frontal bone, no new areas of disease.  · Initiation of palliative oral single agent Xeloda 2/7/18 2000 mg a.m., 1500 mg p.m. for 14/21 days.   · Following 3 cycles xeloda, bone scan 4/4/18 showed no change from the prior study.  CT scan 4/4/18 showed a small pericardial effusion of unclear significance as well as a subcutaneous nodule in the right lateral chest wall.  Subsequent evaluation with echocardiogram 4/17/18 showed no evidence of pericardial effusion.  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  We discussed the possibility of surgical excision of the right subcutaneous chest wall lesion for more definitive diagnosis.  Reviewed previous CT images dating back to 12/8/17 and the lesion was present even at that time measuring around 1.7 cm although not commented on in the radiology report.  As this appears to be an indolent low-grade process unrelated to her breast cancer, recommendee foregoing surgical excision at this time and monitoring this area on future scans.  The patient agreed.    · Following 6 cycles of Xeloda, CT 6/6/18  showed stable findings, no evidence of progressive disease.  There was a comment regarding subcutaneous abnormality in the anterior abdominal wall and this was related to Lovenox injection sites.  Bone scan 6/6/18 showed no interval change.   · CT scan and bone scan 8/13/18 following 9 cycles of Xeloda showed stable findings with  no evidence of significant visceral metastases.  Her bone lesions appear stable on bone scan.  The spindle cell neoplasm in her right chest wall actually decreased in size from 2 cm down to 1.6 cm.    · The patient experienced some symptoms of diarrhea, anorexia, generalized weakness during cycle 9 Xeloda it was unclear whether this was related to a viral gastroenteritis or toxicity from treatment.  Symptoms recurred during cycle 10 and treatment was cut short by 2 days.  Symptoms attributed to Xeloda.  With cycle 11, dose and schedule altered to 1500 mg twice daily for 7 days on followed by 7 days off .  · CT scan 9/9/2020 with no significant changes.  Bone scan 9/9/2020 read as unchanged from prior studies however did note an area of slight activity in the medial left femur.  Contacted radiology and although this was not noted on prior reports appears to have been present.  Subsequent MRI left femur 9/21/2020 with cortical thickening and periosteal edema left iliac us muscle insertion to the medial left femur with no evidence of metastatic disease, favored to represent periosteal change secondary to insertional tendinitis.  · Most recent 3-month interval scans from 2/23/2021 with CT chest abdomen pelvis and bone scan showing stable findings.  · The patient returns today continuing on treatment with Xeloda 1500 mg twice daily 7 days on followed by 7 days off.  She is due for monthly Xgeva today as well.  The patient does continue to tolerate Xeloda reasonably well with the exception of her hand-foot symptoms.  She continues to use triamcinolone cream 0.1% for 1 week on followed by 1 week off and this has produced some improvement as recommended by dermatology.  She will be seeing dermatology next week in follow-up.  She has some minimal diarrhea relieved with Imodium as needed.  This is stable.  She has some mild fatigue which is also stable and tolerable.  She does feel that her quality of life is acceptable to  continue on treatment.  The patient does note some worsening cervical spine pain and headache over the past few weeks.  She has been doing a significant amount of computer work and leaning over the computer during this time.  Is possible that this is purely musculoskeletal discomfort related to her positioning however she is concerned and we will go ahead and evaluate this further when she is due for scans in 3 weeks by adding CT cervical spine and CT head.  The patient did not wish to pursue MRI.  She is quite anxious for scans and had been taking Percocet which she had as an old prescription.  She is requesting refill for that today and I suggested that we try instead Xanax 0.25 mg before the scans and did provide a prescription today for Xanax every 8 hours as needed, (#60).  3. Right pulmonary embolism:  · Diagnosed on CT angiogram 10/21/17 involving small right lower lobe pulmonary artery.  Lower extremity Dopplers negative.  · Bilateral lower extremity Dopplers negative again 12/5/17.  · Received chronic Lovenox 1 mg/kg twice per day, transition to oral Eliquis in February 2019, continuing on Eliquis 5 mg twice daily.  4. Cancer related pain:  · Previously receiving Duragesic 50 µg patch every 72 hours along with Dilaudid 4 mg as needed for breakthrough pain  · The patient's pain improved over time and she was able to discontinue both Duragesic and Dilaudid in the interval.  · Patient does take occasional Flexeril at bedtime due to back spasm/pain when she has been more active.  · The patient does have some occasional aggravation of her chronic back pain and does use tramadol 50 mg every 8 hours as needed  · Patient's pain is stable.  She does continue to use tramadol 50 mg every 12 hours as needed which has been effective.  5. Chemotherapy-induced diarrhea with subsequent C. difficile colitis in the setting of previous ulcerative colitis:  · Patient with reported history of ulcerative colitis, is not on  active therapy.  · The patient developed initial diarrhea related to Xeloda at regular dosing.  · Symptoms improved on reduced dose Xeloda  · Flare of symptoms in October 2018 with apparent finding of C. difficile colitis by GI, treated with course of oral vancomycin with improvement in symptoms.  · Patient notes minimal intermittent diarrhea on Xeloda requiring occasional dosing of Imodium.    6. Traumatic left tibia/fibular fracture:  · Status post ORIF 12/6/17  · Specimen was sent for pathologic review, negative for evidence of malignancy  7. Hypercalcemia:  · Suspect hypercalcemia of malignancy, calcium in  10-11 range previously.  · Calcium normalized following initiation of monthly Xgeva on 1/23/18.  8. Chemotherapy-induced mucositis:  · Patient had a minimal degree of mucositis with cycle 2.  The patient has magic mouthwash to use as needed.  No subsequent mucositis.  9. Recurrent UTI, bladder wall thickening on CT:  · Patient had an enterococcal UTI on 3/2/18 sensitive to nitrofurantoin and received treatment, unclear how long.  · Recurrent UTI 3/20/18 with urine culture growing Klebsiella, initially treated with nitrofurantoin, transitioned to Levaquin.  · CT 4/4/18 with diffuse bladder wall thickening with increased nodular thickening at the left base.  Referral to urogynecology Dr. May Johnson.  She was placed on a prophylactic dose of trimethoprim 100 mg daily, bladder wall thickening felt to be related to recent recurrent urinary tract infections.  · Patient with urinary symptoms, treated with course of Macrobid at the end of December 2018, urine culture however was negative 12/31/18.  · Patient was found to have Klebsiella UTI 7/29/2019 which was successfully treated with Macrobid with complete resolution of symptoms.  10.   Mobility:  · The patient underwent an intensive course of [facility] at [facility].  She graduated from her outpatient course November 2018.  · Overall the patient has improved  dramatically in terms of mobility, now walking around 1 mile per day without assistance.  11.  Depression:  · The patient is continuing follow-up in the supportive oncology clinic and is currently continuing on Cymbalta 30 mg twice daily as well as gabapentin 300 mg twice daily.  · The patient feels that her depression symptoms are currently fairly well controlled.  She has seen some benefit from attending support groups at [employer] which she plans to continue.  · The patient does continue follow-up in supportive oncology clinic, due to be seen on 5/6/2021.  12. 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  Symptoms did improve and she continued on the same dose.    · Patient was referred to dermatology and has been continuing on triamcinolone 0.1% cream used for 1 week on followed by 1 week off which led to some further improvement.   · Today, symptoms involving the hands are stable using frequent application emollient cream and use of triamcinolone cream per dermatology as noted above.  Patient feels that symptoms are acceptable to continue on treatment.  Patient notes that she is scheduled to see dermatology next week in follow-up.  13. Evidence of steatosis on scans with mild intermittent elevated liver function studies:  · Liver function studies increased 8/20/19 with ALT 98, AST 70, normal total bilirubin.  · Negative viral hepatitis A, B, and C panel 8/23/18  · Likely related to hepatic steatosis.   · Subsequent improvement in LFTs  · Today, LFTs normal  14. Chemotherapy induced leukopenia:  · WBC today  3.28 with normal differential, ANC 2.08  15. GERD:  · The patient continues on omeprazole 40 mg daily (2 x 20 mg).  · The patient also continues to use Carafate 1 g 4 times daily  · Symptoms currently  stable  16. Insomnia:  · Patient with prior paradoxical reaction to Benadryl  · Improved previously on temazepam 15 mg nightly as needed.   · Patient noted subsequently that temazepam was having no effect.   · Symptoms currently stable on gabapentin 300 mg in the evening and 300 mg at night  17. Health maintenance:  · Patient notes that she has a history of colon polyps as well as ulcerative colitis and was due for a follow-up colonoscopy on 9/12/2019.  We did discuss there is no necessity to pursue colonoscopy in the setting of her metastatic breast cancer.    · The patient did undergo colonoscopy on 2/7/2020 with findings of muscular hypertrophy and diverticulosis.   18. Right shoulder pain:  · Patient was evaluated by Dr Forrester.  She has experienced difficulty over the past year and a half with her right shoulder although she has not complained of this in prior visits to our office.  She reports difficulty with abduction.    · The patient did undergo MRI of her right shoulder on 11/21/2019 at an outside facility showing multiple abnormalities including supraspinatus tendinosis, labral tear but no evidence of metastatic disease.  · Symptoms currently improved/resolved.  19. Ocular changes in part related to Xeloda:  · Patient experienced a mild degree of blurred vision as well as burning and pruritus.  · She was seen by her ophthalmologist and has been continuing on xiidra ophthalmic drops which have helped.  · Likely both issues are to some extent related to Xeloda.  · Symptoms currently stable to improved.  20. Elevated glucose:  · It is noted the patient's glucose at the last few visits has been in the high 100 range, postprandial.  · She has had a hemoglobin A1c that has been in the high 5-6 range in the past, last checked on 5/1/2019 at 5.7  · Patient will be seeing Dr. Chanel in the near future on 5/14/2021, will defer to her regarding further management of this issue.  21. COVID-19 vaccination  · Patient  received the Pfizer vaccine, second dose administered on 4/2/2021 without side effects.     Plan:  1. Continue oral Xeloda 1500 mg twice a day 7 days on followed by 7 days off.  2. Monthly Xgeva today  3. Continue Eliquis 5 mg twice daily.  4. Continue use of emollient cream on the hands and feet and continue to wear socks and gloves at night as well as use of triamcinolone cream as prescribed by dermatology (1 week on followed by 1 week off).  5. Continue omeprazole 40 mg daily and Carafate 1 g 4 times daily.    6. Continue xiidra ophthalmic drops prescribed by ophthalmology  7. The patient will be seeing dermatology in the next week and follow-up  8. The patient continues follow-up in the supportive oncology clinic, scheduled to be seen on 5/6/2021.  9. In 3 weeks CT chest abdomen and pelvis and bone scan  10. We will add CT cervical spine and head to scans in 3 weeks  11. Prescription sent for Xanax 0.25 mg every 8 hours as needed (#60).  Patient will take prior to upcoming CT scan.  12. In 4 weeks MD visit with CBC, CMP, magnesium, phosphorus and Xgeva.  We will review scan results.     Patient continuing on high risk medication requiring intensive monitoring.

## 2021-04-26 ENCOUNTER — LAB (OUTPATIENT)
Dept: OTHER | Facility: HOSPITAL | Age: 61
End: 2021-04-26

## 2021-04-26 ENCOUNTER — INFUSION (OUTPATIENT)
Dept: ONCOLOGY | Facility: HOSPITAL | Age: 61
End: 2021-04-26

## 2021-04-26 ENCOUNTER — OFFICE VISIT (OUTPATIENT)
Dept: ONCOLOGY | Facility: CLINIC | Age: 61
End: 2021-04-26

## 2021-04-26 VITALS
HEART RATE: 104 BPM | WEIGHT: 187.4 LBS | BODY MASS INDEX: 34.48 KG/M2 | RESPIRATION RATE: 18 BRPM | DIASTOLIC BLOOD PRESSURE: 69 MMHG | SYSTOLIC BLOOD PRESSURE: 129 MMHG | TEMPERATURE: 97.7 F | HEIGHT: 62 IN | OXYGEN SATURATION: 96 %

## 2021-04-26 DIAGNOSIS — C50.811 MALIGNANT NEOPLASM OF OVERLAPPING SITES OF RIGHT BREAST IN FEMALE, ESTROGEN RECEPTOR NEGATIVE (HCC): Primary | ICD-10-CM

## 2021-04-26 DIAGNOSIS — C50.811 MALIGNANT NEOPLASM OF OVERLAPPING SITES OF RIGHT BREAST IN FEMALE, ESTROGEN RECEPTOR NEGATIVE (HCC): ICD-10-CM

## 2021-04-26 DIAGNOSIS — Z17.1 MALIGNANT NEOPLASM OF OVERLAPPING SITES OF RIGHT BREAST IN FEMALE, ESTROGEN RECEPTOR NEGATIVE (HCC): Primary | ICD-10-CM

## 2021-04-26 DIAGNOSIS — Z17.1 MALIGNANT NEOPLASM OF OVERLAPPING SITES OF RIGHT BREAST IN FEMALE, ESTROGEN RECEPTOR NEGATIVE (HCC): ICD-10-CM

## 2021-04-26 LAB
ALBUMIN SERPL-MCNC: 4.1 G/DL (ref 3.5–5.2)
ALBUMIN/GLOB SERPL: 1.5 G/DL
ALP SERPL-CCNC: 66 U/L (ref 39–117)
ALT SERPL W P-5'-P-CCNC: 21 U/L (ref 1–33)
ANION GAP SERPL CALCULATED.3IONS-SCNC: 10.8 MMOL/L (ref 5–15)
AST SERPL-CCNC: 25 U/L (ref 1–32)
BASOPHILS # BLD AUTO: 0.05 10*3/MM3 (ref 0–0.2)
BASOPHILS NFR BLD AUTO: 1.5 % (ref 0–1.5)
BILIRUB SERPL-MCNC: 0.7 MG/DL (ref 0–1.2)
BUN SERPL-MCNC: 26 MG/DL (ref 8–23)
BUN/CREAT SERPL: 31.7 (ref 7–25)
CALCIUM SPEC-SCNC: 9.3 MG/DL (ref 8.6–10.5)
CHLORIDE SERPL-SCNC: 99 MMOL/L (ref 98–107)
CO2 SERPL-SCNC: 29.2 MMOL/L (ref 22–29)
CREAT SERPL-MCNC: 0.82 MG/DL (ref 0.57–1)
DEPRECATED RDW RBC AUTO: 58.1 FL (ref 37–54)
EOSINOPHIL # BLD AUTO: 0.15 10*3/MM3 (ref 0–0.4)
EOSINOPHIL NFR BLD AUTO: 4.6 % (ref 0.3–6.2)
ERYTHROCYTE [DISTWIDTH] IN BLOOD BY AUTOMATED COUNT: 15.6 % (ref 12.3–15.4)
GFR SERPL CREATININE-BSD FRML MDRD: 71 ML/MIN/1.73
GLOBULIN UR ELPH-MCNC: 2.7 GM/DL
GLUCOSE SERPL-MCNC: 175 MG/DL (ref 65–99)
HCT VFR BLD AUTO: 40.1 % (ref 34–46.6)
HGB BLD-MCNC: 12.8 G/DL (ref 12–15.9)
IMM GRANULOCYTES # BLD AUTO: 0 10*3/MM3 (ref 0–0.05)
IMM GRANULOCYTES NFR BLD AUTO: 0 % (ref 0–0.5)
LYMPHOCYTES # BLD AUTO: 0.83 10*3/MM3 (ref 0.7–3.1)
LYMPHOCYTES NFR BLD AUTO: 25.3 % (ref 19.6–45.3)
MAGNESIUM SERPL-MCNC: 2 MG/DL (ref 1.6–2.4)
MCH RBC QN AUTO: 32.2 PG (ref 26.6–33)
MCHC RBC AUTO-ENTMCNC: 31.9 G/DL (ref 31.5–35.7)
MCV RBC AUTO: 100.8 FL (ref 79–97)
MONOCYTES # BLD AUTO: 0.17 10*3/MM3 (ref 0.1–0.9)
MONOCYTES NFR BLD AUTO: 5.2 % (ref 5–12)
NEUTROPHILS NFR BLD AUTO: 2.08 10*3/MM3 (ref 1.7–7)
NEUTROPHILS NFR BLD AUTO: 63.4 % (ref 42.7–76)
NRBC BLD AUTO-RTO: 0 /100 WBC (ref 0–0.2)
PHOSPHATE SERPL-MCNC: 3.5 MG/DL (ref 2.5–4.5)
PLATELET # BLD AUTO: 223 10*3/MM3 (ref 140–450)
PMV BLD AUTO: 10.8 FL (ref 6–12)
POTASSIUM SERPL-SCNC: 4.6 MMOL/L (ref 3.5–5.2)
PROT SERPL-MCNC: 6.8 G/DL (ref 6–8.5)
RBC # BLD AUTO: 3.98 10*6/MM3 (ref 3.77–5.28)
SODIUM SERPL-SCNC: 139 MMOL/L (ref 136–145)
WBC # BLD AUTO: 3.28 10*3/MM3 (ref 3.4–10.8)

## 2021-04-26 PROCEDURE — 83735 ASSAY OF MAGNESIUM: CPT | Performed by: INTERNAL MEDICINE

## 2021-04-26 PROCEDURE — 99214 OFFICE O/P EST MOD 30 MIN: CPT | Performed by: INTERNAL MEDICINE

## 2021-04-26 PROCEDURE — 25010000002 DENOSUMAB 120 MG/1.7ML SOLUTION: Performed by: INTERNAL MEDICINE

## 2021-04-26 PROCEDURE — 84100 ASSAY OF PHOSPHORUS: CPT | Performed by: INTERNAL MEDICINE

## 2021-04-26 PROCEDURE — 96372 THER/PROPH/DIAG INJ SC/IM: CPT

## 2021-04-26 PROCEDURE — 85025 COMPLETE CBC W/AUTO DIFF WBC: CPT | Performed by: INTERNAL MEDICINE

## 2021-04-26 PROCEDURE — 36415 COLL VENOUS BLD VENIPUNCTURE: CPT

## 2021-04-26 PROCEDURE — 80053 COMPREHEN METABOLIC PANEL: CPT | Performed by: INTERNAL MEDICINE

## 2021-04-26 RX ORDER — ALPRAZOLAM 0.25 MG/1
0.25 TABLET ORAL 2 TIMES DAILY PRN
Qty: 60 TABLET | Refills: 0 | Status: SHIPPED | OUTPATIENT
Start: 2021-04-26

## 2021-04-26 RX ADMIN — DENOSUMAB 120 MG: 120 INJECTION SUBCUTANEOUS at 11:13

## 2021-04-28 NOTE — PROGRESS NOTES
Patient has had a mastectomy and wears breast prosthesis and bras for balance symmetry. Her products need to be replaced because they are past their useful lifespan.    Thanks,     Electronically Signed By: Ubaldo Hancock MD  (NPI: [id number])

## 2021-04-30 DIAGNOSIS — R73.9 HYPERGLYCEMIA: ICD-10-CM

## 2021-04-30 DIAGNOSIS — E78.5 HYPERLIPIDEMIA, UNSPECIFIED HYPERLIPIDEMIA TYPE: Primary | ICD-10-CM

## 2021-05-06 ENCOUNTER — OFFICE VISIT (OUTPATIENT)
Dept: PSYCHIATRY | Facility: HOSPITAL | Age: 61
End: 2021-05-06

## 2021-05-06 DIAGNOSIS — R53.0 NEOPLASTIC MALIGNANT RELATED FATIGUE: ICD-10-CM

## 2021-05-06 DIAGNOSIS — F33.1 MAJOR DEPRESSIVE DISORDER, RECURRENT EPISODE, MODERATE (HCC): Primary | ICD-10-CM

## 2021-05-06 DIAGNOSIS — G47.33 OSA (OBSTRUCTIVE SLEEP APNEA): ICD-10-CM

## 2021-05-06 PROCEDURE — 99214 OFFICE O/P EST MOD 30 MIN: CPT | Performed by: NURSE PRACTITIONER

## 2021-05-06 RX ORDER — MODAFINIL 200 MG/1
200 TABLET ORAL DAILY
Qty: 30 TABLET | Refills: 0 | Status: SHIPPED | OUTPATIENT
Start: 2021-05-06 | End: 2021-08-02

## 2021-05-06 NOTE — PROGRESS NOTES
Supportive Oncology Services  In Person Session    Subjective  Patient ID: Martha Davey is a 60 y.o. female is seen face to face in the [facility] ([facility]) Clinic.    CC: Fatigue, difficulty completing tasks    HPI:  Pt noted to be alert, oriented, and engaged in conversation. Affect and mood demoralized and fatigued.  Pt continues to report regular sleep from appx 1-9 AM. Occasionally fragmented but able to go back to sleep. Energy remains somewhat variable. Pt explores general routine to include breakfast, making of lists, and difficulty completing necessary tasks. Feels frazzled with various expectations.  Pt does report appreciation of support group, although feels largely unable to shake the anxiety of when the disease will return. Acknowledges minimal enjoyment in life at this time, and significant burden of uncertainty. Pt does explore enjoyment gardening and making floral baskets. Finds she continues to appreciate time outside, and appreciates ability to ride bike, garden, and read. Finds she remains distracted by various tasks with inability to focus on any one thing. Remains distracted by son, mother, and others, and invests little in personal life experience.  Fatigue reviewed; pt does describes having sleep apnea, and is non compliant with treatment. Has not followed with sleep med team in over a year.     Review of Systems   Constitutional: Positive for fatigue.   Psychiatric/Behavioral: Positive for decreased concentration and dysphoric mood. Negative for sleep disturbance. The patient is nervous/anxious.      Medications Reviewed:  Cymbalta 30 mg bid  Xanax infrequent PRN with scans  Restoril 15 mg q hs    Diagnoses and all orders for this visit:    1. Major depressive disorder, recurrent episode, moderate (CMS/HCC) (Primary)  -     modafinil (Provigil) 200 MG tablet; Take 1 tablet by mouth Daily.  Dispense: 30 tablet; Refill: 0    2. MARIA M (obstructive sleep apnea)  -     modafinil  (Provigil) 200 MG tablet; Take 1 tablet by mouth Daily.  Dispense: 30 tablet; Refill: 0    3. Neoplastic malignant related fatigue  -     modafinil (Provigil) 200 MG tablet; Take 1 tablet by mouth Daily.  Dispense: 30 tablet; Refill: 0    Plan of Care  Patient with continued report of demoralization surrounding complicated life and cancer diagnosis.  We will continue Cymbalta as written.  Explored patient quality of life goals, specifically to include perception of disorganization and difficulty focusing.  Explored implications of uncontrolled sleep apnea, and reviewed graded introduction to wearing of CPAP.  Will add modafinil to assist with wakefulness, and patient has been instructed to take half tab daily.  Provided education surrounding the fact that this does not replace need for management of CPAP.  Patient is agreeable.  Reviewed behavioral activation strategies and effective communitation techniques.     Total time spent in patient encounter: 31 minutes

## 2021-05-08 LAB
BUN SERPL-MCNC: 21 MG/DL (ref 8–23)
BUN/CREAT SERPL: 24.7 (ref 7–25)
CALCIUM SERPL-MCNC: 8.9 MG/DL (ref 8.6–10.5)
CHLORIDE SERPL-SCNC: 102 MMOL/L (ref 98–107)
CHOLEST SERPL-MCNC: 188 MG/DL (ref 0–200)
CO2 SERPL-SCNC: 33.3 MMOL/L (ref 22–29)
CREAT SERPL-MCNC: 0.85 MG/DL (ref 0.57–1)
GLUCOSE SERPL-MCNC: 95 MG/DL (ref 65–99)
HBA1C MFR BLD: 5.2 % (ref 4.8–5.6)
HDLC SERPL-MCNC: 67 MG/DL (ref 40–60)
LDLC SERPL CALC-MCNC: 104 MG/DL (ref 0–100)
LDLC/HDLC SERPL: 1.53 {RATIO}
POTASSIUM SERPL-SCNC: 4.9 MMOL/L (ref 3.5–5.2)
SODIUM SERPL-SCNC: 144 MMOL/L (ref 136–145)
TRIGL SERPL-MCNC: 93 MG/DL (ref 0–150)
TSH SERPL DL<=0.005 MIU/L-ACNC: 3.93 UIU/ML (ref 0.27–4.2)
VLDLC SERPL CALC-MCNC: 17 MG/DL (ref 5–40)

## 2021-05-11 RX ORDER — PSEUDOEPHEDRINE HCL 30 MG
TABLET ORAL
Qty: 24 TABLET | Refills: 0 | OUTPATIENT
Start: 2021-05-11

## 2021-05-11 RX ORDER — LEVOCETIRIZINE DIHYDROCHLORIDE 5 MG/1
5 TABLET, FILM COATED ORAL EVERY EVENING
Qty: 90 TABLET | Refills: 3 | Status: SHIPPED | OUTPATIENT
Start: 2021-05-11 | End: 2021-10-18

## 2021-05-12 ENCOUNTER — TELEPHONE (OUTPATIENT)
Dept: INTERNAL MEDICINE | Facility: CLINIC | Age: 61
End: 2021-05-12

## 2021-05-12 NOTE — TELEPHONE ENCOUNTER
----- Message from Jazmine Chanel MD sent at 5/11/2021  5:37 PM EDT -----  Rx request for sudafed. In general this is not a great medication. I sent rx for xyzal instead. If needed can have office or video visit to discuss options for treatment for allergic rhinitis. Please ensure she is using flonase and astelin daily.   kate

## 2021-05-14 ENCOUNTER — OFFICE VISIT (OUTPATIENT)
Dept: INTERNAL MEDICINE | Facility: CLINIC | Age: 61
End: 2021-05-14

## 2021-05-14 VITALS
HEART RATE: 104 BPM | WEIGHT: 190 LBS | HEIGHT: 62 IN | DIASTOLIC BLOOD PRESSURE: 94 MMHG | SYSTOLIC BLOOD PRESSURE: 136 MMHG | BODY MASS INDEX: 34.96 KG/M2

## 2021-05-14 DIAGNOSIS — Z78.0 MENOPAUSE: ICD-10-CM

## 2021-05-14 DIAGNOSIS — I10 HYPERTENSION, UNSPECIFIED TYPE: ICD-10-CM

## 2021-05-14 DIAGNOSIS — Z00.00 HEALTHCARE MAINTENANCE: Primary | ICD-10-CM

## 2021-05-14 DIAGNOSIS — C50.911 MALIGNANT NEOPLASM OF RIGHT FEMALE BREAST, UNSPECIFIED ESTROGEN RECEPTOR STATUS, UNSPECIFIED SITE OF BREAST (HCC): ICD-10-CM

## 2021-05-14 DIAGNOSIS — G47.30 SLEEP APNEA, UNSPECIFIED TYPE: ICD-10-CM

## 2021-05-14 DIAGNOSIS — E78.5 HYPERLIPIDEMIA, UNSPECIFIED HYPERLIPIDEMIA TYPE: ICD-10-CM

## 2021-05-14 PROCEDURE — 99396 PREV VISIT EST AGE 40-64: CPT | Performed by: INTERNAL MEDICINE

## 2021-05-14 RX ORDER — PHENAZOPYRIDINE HYDROCHLORIDE 200 MG/1
200 TABLET, FILM COATED ORAL 3 TIMES DAILY PRN
Qty: 12 TABLET | Refills: 0 | Status: SHIPPED | OUTPATIENT
Start: 2021-05-14 | End: 2021-08-24 | Stop reason: SDUPTHER

## 2021-05-14 RX ORDER — MONTELUKAST SODIUM 10 MG/1
10 TABLET ORAL NIGHTLY
Qty: 90 TABLET | Refills: 1 | Status: SHIPPED | OUTPATIENT
Start: 2021-05-14 | End: 2021-10-18

## 2021-05-14 NOTE — PROGRESS NOTES
Subjective   CPE  Metastatic breast cancer  Hypertension  Hyperlipidemia  MARIA M    Martha Davey is a 60 y.o. female who presents for a complete physical exam.  She has MARIA M on CPAP. She reports that it is difficult to use due to she has a newer mask that is difficult to tolerate. She has started provigil w/ benefit. Has B palmar and plantar dryness and cracking related to xeloda. She has been to wound care for this in the past but does have a notable wound of her great toe on the left foot. Facial pressure and congestion maxillary and frontal region. She has sinus allegies. She is on flonase daily, xyzal. Patient bp at home is not routinely tested.       Review of Systems   Constitutional: Negative.    Eyes: Negative.    Respiratory: Negative.    Cardiovascular: Negative.    Gastrointestinal: Negative.    Endocrine: Negative.    Genitourinary: Negative.    Musculoskeletal: Positive for arthralgias.        Right knee pain   Skin: Negative.    Allergic/Immunologic: Negative.    Neurological: Negative.    Hematological: Negative.    Psychiatric/Behavioral: Negative.        The following portions of the patient's history were reviewed and updated as appropriate: allergies, current medications, past family history, past medical history, past social history, past surgical history and problem list.     Patient Active Problem List   Diagnosis   • Malignant neoplasm of overlapping sites of right breast in female, estrogen receptor negative (CMS/HCC)   • Hematuria   • Hyperlipidemia   • Hypertension   • Hereditary sensorimotor neuropathy   • Hyperglycemia   • MARIA M on CPAP   • Gastroesophageal reflux disease   • Colon polyp   • Diverticulitis of colon with perforation   • Foot pain   • Osteoarthritis of knee   • Leukocytosis   • Osteoarthritis of shoulder   • Senile osteopenia   • Chronic right-sided thoracic back pain   • Closed fracture of left fibula and tibia   • Closed displaced spiral fracture of shaft of left tibia   •  Cancer associated pain   • Cord compression (CMS/HCC)   • Closed T6 spinal fracture (CMS/HCC)   • History of pulmonary embolism   • Bone metastases (CMS/HCC)   • Surgery follow-up examination   • Dysuria   • Pericardial effusion   • Other fatigue   • Acute pulmonary embolism (CMS/HCC)   • Breast cancer, right (CMS/HCC)   • Metastasis to spinal cord (CMS/HCC)   • Mood disorder (CMS/HCC)       Past Medical History:   Diagnosis Date   • Acute pulmonary embolism, cancer-related 10/2017   • Allergic rhinitis    • Arthritis     Right knee; left shoulder   • Cancer (CMS/HCC) 2010    Right breast   • Cancer (CMS/HCC) 10/2017    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   • Charcot-Saloni-Tooth disease    • CPAP (continuous positive airway pressure) dependence    • Dry eye    • GERD (gastroesophageal reflux disease)    • H/O Colon polyps    • H/O Uterine fibroid    • Heart murmur    • Hyperlipidemia    • Hypertension    • PONV (postoperative nausea and vomiting)        Past Surgical History:   Procedure Laterality Date   • BLADDER SURGERY      Bladder lift   • BREAST RECONSTRUCTION, BREAST TISSUE EXPANDER INSERTION Right    • BREAST SURGERY Right 2010    Mastectomy   •  SECTION  ,    • CHOLECYSTECTOMY     • COLONOSCOPY     • HERNIA REPAIR  2015    [facility]   • HYSTERECTOMY      Partial   • KNEE SURGERY Right    • LEG SURGERY Left     Broken   • MASTECTOMY Right    • KS TREAT TIBIAL SHAFT FX, INTRAMED IMPLANT Left 2017    Procedure: TIBIA INTRAMEDULLARY NAIL/DON INSERTION;  Surgeon: Romero Shanks MD;  Location: [facility];  Service: Orthopedics   • THORACIC DECOMPRESSION POSTERIOR FUSION WITH INSTRUMENTATION N/A 2017    Procedure: T6 costotransversectomy and decompression with T3 to  T9 posterior spinal fusion with instrumentation with Jennifer Gonzalez and Nael Dennis [facility];  Surgeon: Quan Nayak MD;  Location: [facility];  Service:        Family History   Problem Relation  Age of Onset   • Heart disease Mother    • Hyperlipidemia Mother    • Hypertension Mother    • Diabetes Father    • Charcot-Saloni-Tooth disease Father    • Heart disease Father    • Hypertension Father    • Heart failure Father    • Diabetes Sister    • Heart disease Sister    • Hypertension Sister    • Heart disease Maternal Aunt    • Scoliosis Maternal Aunt    • Heart disease Maternal Uncle    • Diabetes Maternal Grandmother    • Heart disease Maternal Grandmother    • Hypertension Maternal Grandmother    • Uterine cancer Maternal Grandmother    • Heart disease Maternal Grandfather        Social History     Socioeconomic History   • Marital status:      Spouse name: Murray   • Number of children: 3   • Years of education: College   • Highest education level: Not on file   Tobacco Use   • Smoking status: Never Smoker   • Smokeless tobacco: Never Used   Substance and Sexual Activity   • Alcohol use: Yes     Comment: social   • Drug use: No   • Sexual activity: Defer       Current Outpatient Medications on File Prior to Visit   Medication Sig Dispense Refill   • acetaminophen (TYLENOL) 500 MG tablet Take 500 mg by mouth Every 6 (Six) Hours As Needed for Mild Pain .     • ALPRAZolam (Xanax) 0.25 MG tablet Take 1 tablet by mouth 2 (Two) Times a Day As Needed for Anxiety. 60 tablet 0   • apixaban (Eliquis) 5 MG tablet tablet Take 1 tablet by mouth Every 12 (Twelve) Hours. 180 tablet 1   • azelastine (ASTELIN) 0.1 % nasal spray 2 sprays into the nostril(s) as directed by provider Daily. Use in each nostril as directed 1 each 12   • calcium carbonate (OS-CARY) 600 MG tablet Take 600 mg by mouth 2 (Two) Times a Day With Meals.     • capecitabine (XELODA) 500 MG chemo tablet Take 3 tablets (1500 mg) by mouth twice a day for 7 days on then 7 days off. 84 tablet 3   • cholecalciferol (VITAMIN D3) 1000 units tablet Take 1,000 Units by mouth Daily.     • diphenoxylate-atropine (LOMOTIL) 2.5-0.025 MG per tablet Take 1  tablet by mouth 4 (Four) Times a Day As Needed for Diarrhea. 60 tablet 0   • DULoxetine (CYMBALTA) 30 MG capsule TAKE 1 CAPSULE BY MOUTH  TWICE DAILY 180 capsule 3   • FLONASE ALLERGY RELIEF 50 MCG/ACT nasal spray 2 sprays into each nostril daily.  11   • gabapentin (NEURONTIN) 300 MG capsule TAKE 1 CAPSULE BY MOUTH  TWICE DAILY 180 capsule 3   • ketoconazole (NIZORAL) 2 % cream Apply  topically to the appropriate area as directed Daily. 15 g 0   • levocetirizine (Xyzal) 5 MG tablet Take 1 tablet by mouth Every Evening. 90 tablet 3   • losartan (COZAAR) 50 MG tablet Take 1 tablet by mouth Daily. 90 tablet 2   • mesalamine (LIALDA) 1.2 g EC tablet TAKE 1 TABLET BY MOUTH  TWICE DAILY 180 tablet 3   • metaxalone (Skelaxin) 800 MG tablet Take 1 tablet by mouth 3 (Three) Times a Day As Needed for Muscle Spasms. 30 tablet 0   • modafinil (Provigil) 200 MG tablet Take 1 tablet by mouth Daily. 30 tablet 0   • mupirocin (BACTROBAN) 2 % ointment JUANITA TO SURGICAL SITE AND COVER WITH BAND AID D UNTIL HEALED     • omeprazole (PriLOSEC) 40 MG capsule TAKE 1 CAPSULE DAILY 90 capsule 2   • ondansetron ODT (ZOFRAN-ODT) 8 MG disintegrating tablet Take 1 tablet by mouth Every 8 (Eight) Hours As Needed for Nausea or Vomiting. 30 tablet 1   • phenazopyridine (PYRIDIUM) 200 MG tablet Take 200 mg by mouth 3 (Three) Times a Day As Needed for bladder spasms.     • prochlorperazine (COMPAZINE) 10 MG tablet Take 1 tablet by mouth Every 6 (Six) Hours As Needed for Nausea or Vomiting. 30 tablet 0   • pseudoephedrine (Sudafed) 30 MG tablet Take 1 tablet by mouth Every 6 (Six) Hours As Needed for Congestion. 20 tablet 0   • saccharomyces boulardii (Florastor) 250 MG capsule Take 1 capsule by mouth 2 (Two) Times a Day. 20 capsule 1   • sennosides-docusate sodium (SENOKOT-S) 8.6-50 MG tablet Take 2 tablets by mouth 2 (Two) Times a Day. 90 tablet 2   • temazepam (RESTORIL) 15 MG capsule Take 1 capsule by mouth At Night As Needed for Sleep. 90 capsule  "0   • traMADol (ULTRAM) 50 MG tablet TAKE 1 TABLET BY MOUTH  EVERY 8 HOURS AS NEEDED FOR MODERATE PAIN 90 tablet 0   • triamcinolone (KENALOG) 0.1 % cream Apply  topically to the appropriate area as directed 2 (Two) Times a Day. 15 g 0   • trimethoprim (TRIMPEX) 100 MG tablet Take 100 mg by mouth Daily.     • XIIDRA 5 % ophthalmic solution INSTILL 1 DROP INTO EACH EYE BID.     • sucralfate (CARAFATE) 1 g tablet Take 1 tablet by mouth 4 (Four) Times a Day. 120 tablet 2     No current facility-administered medications on file prior to visit.       Allergies   Allergen Reactions   • Hydrocodone Nausea Only   • Morphine And Related Nausea And Vomiting       Immunization History   Administered Date(s) Administered   • COVID-19 (PFIZER) 03/05/2021, 03/30/2021   • Flu Mist 10/01/2015   • Flu Vaccine Quad PF >18YRS 09/25/2017, 10/01/2018, 09/22/2020   • Flu Vaccine Split Quad 10/01/2018   • Flulaval/Fluarix/Fluzone Quad 10/11/2019, 09/22/2020   • Hepatitis A 03/30/2018, 12/04/2018   • Influenza Quad Vaccine (Inpatient) 10/01/2018   • Influenza TIV (IM) 10/01/2012, 10/01/2014, 10/11/2016   • Pneumococcal Conjugate 13-Valent (PCV13) 01/02/2006, 05/11/2015   • Pneumococcal Polysaccharide (PPSV23) 01/01/2008, 03/30/2018, 10/30/2018   • Td 01/26/1998   • Tdap 01/01/2008, 05/06/2019       Objective     /94   Pulse 104   Ht 157.5 cm (62\")   Wt 86.2 kg (190 lb)   LMP  (LMP Unknown)   BMI 34.75 kg/m²     Physical Exam  Vitals and nursing note reviewed.   Constitutional:       Appearance: Normal appearance.   HENT:      Head: Normocephalic and atraumatic.      Ears:      Comments: S/p surgical changes     Nose: Nose normal.      Mouth/Throat:      Mouth: Mucous membranes are moist.   Eyes:      Extraocular Movements: Extraocular movements intact.      Pupils: Pupils are equal, round, and reactive to light.   Cardiovascular:      Rate and Rhythm: Normal rate and regular rhythm.      Pulses: Normal pulses.      Heart sounds: " Normal heart sounds.   Pulmonary:      Effort: Pulmonary effort is normal.      Breath sounds: Normal breath sounds.   Abdominal:      General: Abdomen is flat. Bowel sounds are normal.      Palpations: Abdomen is soft.   Musculoskeletal:         General: Normal range of motion.      Cervical back: Normal range of motion.   Skin:     Comments: Left toe skin break down at base 1st digit  Skin dryness palms and soles     Neurological:      General: No focal deficit present.      Mental Status: She is alert and oriented to person, place, and time.   Psychiatric:         Mood and Affect: Mood normal.         Behavior: Behavior normal.         Thought Content: Thought content normal.         Judgment: Judgment normal.         Assessment/Plan   Diagnoses and all orders for this visit:    1. Healthcare maintenance (Primary)    2. Menopause  -     DEXA Bone Density Axial    3. Malignant neoplasm of right female breast, unspecified estrogen receptor status, unspecified site of breast (CMS/HCC)  -     DEXA Bone Density Axial    4. Sleep apnea, unspecified type  -     Ambulatory Referral to Sleep Medicine    5. Hypertension, unspecified type    6. Hyperlipidemia, unspecified hyperlipidemia type    Other orders  -     montelukast (Singulair) 10 MG tablet; Take 1 tablet by mouth Every Night.  Dispense: 90 tablet; Refill: 1        Discussion    Patient presents today for a CPE.  Patient follows a healthy diet.   Patient follows an adequate exercise but is encouraged to find an indoor activity as well.  Pap smears are performed by the patient's gynecologist.   Immunizations are up to date.   Patient will schedule a DEXA.  Will continue current meds for bp and lipid regulation.   She will continue provigil and is to f/u w/ sleep medicine to ensure an adequately fitting cpap device. Labs reviewed w/ patient and at goal. She will monitor bp. She is to maintaitn a low sodium diet. She will follow up in 6 months or prn.          Future  Appointments   Date Time Provider Department Center   5/17/2021 11:15 AM [facility] ADMIN RM 1  [facility]   5/17/2021  1:45 PM [facility] CT 3  [facility]   5/17/2021  2:15 PM [facility] NM 1(AXIS)  [facility]   5/24/2021  1:00 PM LAB CHAIR [facility]   5/24/2021  1:40 PM Ubaldo Hancock Jr., MD [facility]   5/24/2021  2:15 PM INJECTION CHAIR [facility] INFUS EP [facility]   6/17/2021  2:00 PM She Brunner APRN  [facility]

## 2021-05-17 ENCOUNTER — HOSPITAL ENCOUNTER (OUTPATIENT)
Dept: NUCLEAR MEDICINE | Facility: HOSPITAL | Age: 61
Discharge: HOME | End: 2021-05-17

## 2021-05-17 ENCOUNTER — HOSPITAL ENCOUNTER (OUTPATIENT)
Dept: CT IMAGING | Facility: HOSPITAL | Age: 61
Discharge: HOME | End: 2021-05-17
Admitting: INTERNAL MEDICINE

## 2021-05-17 DIAGNOSIS — C50.811 MALIGNANT NEOPLASM OF OVERLAPPING SITES OF RIGHT BREAST IN FEMALE, ESTROGEN RECEPTOR NEGATIVE (HCC): ICD-10-CM

## 2021-05-17 DIAGNOSIS — Z17.1 MALIGNANT NEOPLASM OF OVERLAPPING SITES OF RIGHT BREAST IN FEMALE, ESTROGEN RECEPTOR NEGATIVE (HCC): ICD-10-CM

## 2021-05-17 LAB — CREAT BLDA-MCNC: 0.9 MG/DL (ref 0.6–1.3)

## 2021-05-17 PROCEDURE — A9503 TC99M MEDRONATE: HCPCS | Performed by: INTERNAL MEDICINE

## 2021-05-17 PROCEDURE — 0 DIATRIZOATE MEGLUMINE & SODIUM PER 1 ML: Performed by: INTERNAL MEDICINE

## 2021-05-17 PROCEDURE — 82565 ASSAY OF CREATININE: CPT

## 2021-05-17 PROCEDURE — 0 TECHNETIUM MEDRONATE KIT: Performed by: INTERNAL MEDICINE

## 2021-05-17 PROCEDURE — 71260 CT THORAX DX C+: CPT

## 2021-05-17 PROCEDURE — 70470 CT HEAD/BRAIN W/O & W/DYE: CPT

## 2021-05-17 PROCEDURE — 78306 BONE IMAGING WHOLE BODY: CPT

## 2021-05-17 PROCEDURE — 74177 CT ABD & PELVIS W/CONTRAST: CPT

## 2021-05-17 PROCEDURE — 72126 CT NECK SPINE W/DYE: CPT

## 2021-05-17 PROCEDURE — 25010000002 IOPAMIDOL 61 % SOLUTION: Performed by: INTERNAL MEDICINE

## 2021-05-17 RX ORDER — TC 99M MEDRONATE 20 MG/10ML
20.5 INJECTION, POWDER, LYOPHILIZED, FOR SOLUTION INTRAVENOUS
Status: COMPLETED | OUTPATIENT
Start: 2021-05-17 | End: 2021-05-17

## 2021-05-17 RX ADMIN — Medication 20.5 MILLICURIE: at 11:36

## 2021-05-17 RX ADMIN — IOPAMIDOL 85 ML: 612 INJECTION, SOLUTION INTRAVENOUS at 12:52

## 2021-05-17 RX ADMIN — DIATRIZOATE MEGLUMINE AND DIATRIZOATE SODIUM 30 ML: 600; 100 SOLUTION ORAL; RECTAL at 11:52

## 2021-05-24 ENCOUNTER — LAB (OUTPATIENT)
Dept: OTHER | Facility: HOSPITAL | Age: 61
End: 2021-05-24

## 2021-05-24 ENCOUNTER — INFUSION (OUTPATIENT)
Dept: ONCOLOGY | Facility: HOSPITAL | Age: 61
End: 2021-05-24

## 2021-05-24 ENCOUNTER — OFFICE VISIT (OUTPATIENT)
Dept: ONCOLOGY | Facility: CLINIC | Age: 61
End: 2021-05-24

## 2021-05-24 VITALS
RESPIRATION RATE: 18 BRPM | HEIGHT: 62 IN | BODY MASS INDEX: 33.93 KG/M2 | DIASTOLIC BLOOD PRESSURE: 69 MMHG | HEART RATE: 80 BPM | SYSTOLIC BLOOD PRESSURE: 108 MMHG | OXYGEN SATURATION: 95 % | WEIGHT: 184.4 LBS | TEMPERATURE: 96.8 F

## 2021-05-24 DIAGNOSIS — C50.811 MALIGNANT NEOPLASM OF OVERLAPPING SITES OF RIGHT BREAST IN FEMALE, ESTROGEN RECEPTOR NEGATIVE (HCC): Primary | ICD-10-CM

## 2021-05-24 DIAGNOSIS — R93.89 ABNORMAL CT SCAN: ICD-10-CM

## 2021-05-24 DIAGNOSIS — C50.811 MALIGNANT NEOPLASM OF OVERLAPPING SITES OF RIGHT BREAST IN FEMALE, ESTROGEN RECEPTOR NEGATIVE (HCC): ICD-10-CM

## 2021-05-24 DIAGNOSIS — Z17.1 MALIGNANT NEOPLASM OF OVERLAPPING SITES OF RIGHT BREAST IN FEMALE, ESTROGEN RECEPTOR NEGATIVE (HCC): ICD-10-CM

## 2021-05-24 DIAGNOSIS — Z17.1 MALIGNANT NEOPLASM OF OVERLAPPING SITES OF RIGHT BREAST IN FEMALE, ESTROGEN RECEPTOR NEGATIVE (HCC): Primary | ICD-10-CM

## 2021-05-24 LAB
ALBUMIN SERPL-MCNC: 4.1 G/DL (ref 3.5–5.2)
ALBUMIN/GLOB SERPL: 1.5 G/DL
ALP SERPL-CCNC: 61 U/L (ref 39–117)
ALT SERPL W P-5'-P-CCNC: 15 U/L (ref 1–33)
ANION GAP SERPL CALCULATED.3IONS-SCNC: 9.3 MMOL/L (ref 5–15)
AST SERPL-CCNC: 20 U/L (ref 1–32)
BASOPHILS # BLD AUTO: 0.04 10*3/MM3 (ref 0–0.2)
BASOPHILS NFR BLD AUTO: 0.5 % (ref 0–1.5)
BILIRUB SERPL-MCNC: 0.8 MG/DL (ref 0–1.2)
BUN SERPL-MCNC: 24 MG/DL (ref 8–23)
BUN/CREAT SERPL: 26.1 (ref 7–25)
CALCIUM SPEC-SCNC: 9.4 MG/DL (ref 8.6–10.5)
CHLORIDE SERPL-SCNC: 102 MMOL/L (ref 98–107)
CO2 SERPL-SCNC: 29.7 MMOL/L (ref 22–29)
CREAT SERPL-MCNC: 0.92 MG/DL (ref 0.57–1)
DEPRECATED RDW RBC AUTO: 57.4 FL (ref 37–54)
EOSINOPHIL # BLD AUTO: 0.17 10*3/MM3 (ref 0–0.4)
EOSINOPHIL NFR BLD AUTO: 2.1 % (ref 0.3–6.2)
ERYTHROCYTE [DISTWIDTH] IN BLOOD BY AUTOMATED COUNT: 15.7 % (ref 12.3–15.4)
GFR SERPL CREATININE-BSD FRML MDRD: 62 ML/MIN/1.73
GLOBULIN UR ELPH-MCNC: 2.8 GM/DL
GLUCOSE SERPL-MCNC: 98 MG/DL (ref 65–99)
HCT VFR BLD AUTO: 39.4 % (ref 34–46.6)
HGB BLD-MCNC: 12.8 G/DL (ref 12–15.9)
IMM GRANULOCYTES # BLD AUTO: 0.03 10*3/MM3 (ref 0–0.05)
IMM GRANULOCYTES NFR BLD AUTO: 0.4 % (ref 0–0.5)
LYMPHOCYTES # BLD AUTO: 1.08 10*3/MM3 (ref 0.7–3.1)
LYMPHOCYTES NFR BLD AUTO: 13.6 % (ref 19.6–45.3)
MAGNESIUM SERPL-MCNC: 2.3 MG/DL (ref 1.6–2.4)
MCH RBC QN AUTO: 32.4 PG (ref 26.6–33)
MCHC RBC AUTO-ENTMCNC: 32.5 G/DL (ref 31.5–35.7)
MCV RBC AUTO: 99.7 FL (ref 79–97)
MONOCYTES # BLD AUTO: 0.42 10*3/MM3 (ref 0.1–0.9)
MONOCYTES NFR BLD AUTO: 5.3 % (ref 5–12)
NEUTROPHILS NFR BLD AUTO: 6.18 10*3/MM3 (ref 1.7–7)
NEUTROPHILS NFR BLD AUTO: 78.1 % (ref 42.7–76)
NRBC BLD AUTO-RTO: 0 /100 WBC (ref 0–0.2)
PHOSPHATE SERPL-MCNC: 3.8 MG/DL (ref 2.5–4.5)
PLATELET # BLD AUTO: 252 10*3/MM3 (ref 140–450)
PMV BLD AUTO: 10.4 FL (ref 6–12)
POTASSIUM SERPL-SCNC: 4.5 MMOL/L (ref 3.5–5.2)
PROT SERPL-MCNC: 6.9 G/DL (ref 6–8.5)
RBC # BLD AUTO: 3.95 10*6/MM3 (ref 3.77–5.28)
SODIUM SERPL-SCNC: 141 MMOL/L (ref 136–145)
WBC # BLD AUTO: 7.92 10*3/MM3 (ref 3.4–10.8)

## 2021-05-24 PROCEDURE — 99215 OFFICE O/P EST HI 40 MIN: CPT | Performed by: INTERNAL MEDICINE

## 2021-05-24 PROCEDURE — 80053 COMPREHEN METABOLIC PANEL: CPT | Performed by: INTERNAL MEDICINE

## 2021-05-24 PROCEDURE — 25010000002 DENOSUMAB 120 MG/1.7ML SOLUTION: Performed by: INTERNAL MEDICINE

## 2021-05-24 PROCEDURE — 85025 COMPLETE CBC W/AUTO DIFF WBC: CPT | Performed by: INTERNAL MEDICINE

## 2021-05-24 PROCEDURE — 83735 ASSAY OF MAGNESIUM: CPT | Performed by: INTERNAL MEDICINE

## 2021-05-24 PROCEDURE — 36415 COLL VENOUS BLD VENIPUNCTURE: CPT

## 2021-05-24 PROCEDURE — 84100 ASSAY OF PHOSPHORUS: CPT | Performed by: INTERNAL MEDICINE

## 2021-05-24 PROCEDURE — 96372 THER/PROPH/DIAG INJ SC/IM: CPT

## 2021-05-24 RX ORDER — LOSARTAN POTASSIUM 50 MG/1
50 TABLET ORAL DAILY
Qty: 90 TABLET | Refills: 3 | Status: SHIPPED | OUTPATIENT
Start: 2021-05-24 | End: 2022-03-31

## 2021-05-24 RX ADMIN — DENOSUMAB 120 MG: 120 INJECTION SUBCUTANEOUS at 14:41

## 2021-05-24 NOTE — PROGRESS NOTES
Chief Complaint  Previous Stage Ib ([id number]) ER/NM positive, HER-2/peter negative right breast cancer with subsequent metastatic disease identified 10/8/2017, history of right pulmonary embolism, cancer related pain, chemotherapy-induced diarrhea, chemotherapy-induced mucositis, chemotherapy-induced hand-foot syndrome    Subjective        History of Present Illness  The patient returns today in follow-up continuing on oral Xeloda 1500 mg twice daily for 7 days on followed by 7 days off as well as monthly Xgeva and Eliquis 5 mg twice daily.  She has labs and multiple scans to review today including CT chest abdomen pelvis, bone scan, CT head and cervical spine.  The patient at the last visit was reporting an increase in neck pain and headaches.  Patient had been leaning over a computer working quite a bit at the time and felt the pain may have been related to this issue.  She does report over time that her neck pain and headaches have improved significantly.  She did see dermatology again after her last visit here and has returned to using a triamcinolone cream for her hands in addition to use of emollient creams and notes improvement in her symptoms involving the hands.  Her feet are stable.  She was seen in the supportive oncology clinic on 5/6/2021 and with her ongoing fatigue was started on Provigil 200 mg daily and feels that this has provided significant improvement in her fatigue.  She has been much more active, working outdoors in her yard.  She notes a stable appetite although her weight is down 6 pounds today.  She does have some chronic orthopedic pain involving her left shoulder and her right knee.  She did undergo a steroid injection last week in her left shoulder which did help and there is consideration of possible Synvisc injection right knee in the near future.  She had this previously and it did help significantly.  She does note some intermittent back pain that is exacerbated when she is more  "physically active.  She notes that her bowel function has improved over time.  She does note that she fell out of bed around 3 weeks ago with a bad dream, did not sustain any specific injury.      Objective   Vital Signs:   /69   Pulse 80   Temp 96.8 °F (36 °C) (Temporal)   Resp 18   Ht 157.5 cm (62.01\")   Wt 83.6 kg (184 lb 6.4 oz)   SpO2 95%   BMI 33.72 kg/m²     Physical Exam  Constitutional:       Appearance: She is well-developed.   Eyes:      Conjunctiva/sclera: Conjunctivae normal.   Neck:      Thyroid: No thyromegaly.   Cardiovascular:      Rate and Rhythm: Normal rate and regular rhythm.      Heart sounds: No murmur heard.   No friction rub. No gallop.    Pulmonary:      Effort: No respiratory distress.      Breath sounds: Normal breath sounds.   Abdominal:      General: Bowel sounds are normal. There is no distension.      Palpations: Abdomen is soft.      Tenderness: There is no abdominal tenderness.   Lymphadenopathy:      Head:      Right side of head: No submandibular adenopathy.      Cervical: No cervical adenopathy.      Upper Body:      Right upper body: No supraclavicular adenopathy.      Left upper body: No supraclavicular adenopathy.   Skin:     General: Skin is warm and dry.      Findings: Rash present.      Comments: Significant bilateral palmar erythema which extends to the dorsal aspect of the hands also.  There has been improvement in the degree of skin cracking and peeling in the palmar surface bilaterally.   Neurological:      Mental Status: She is alert and oriented to person, place, and time.      Cranial Nerves: No cranial nerve deficit.      Motor: No abnormal muscle tone.      Deep Tendon Reflexes: Reflexes normal.   Psychiatric:         Behavior: Behavior normal.        Result Review : Reviewed CBC, CMP, magnesium, phosphorus from today.  Reviewed CT chest abdomen pelvis, bone scan, CT head, CT cervical spine from 5/17/2021.  I did personally review CT images with " interpretation as outlined below.       Assessment and Plan     1. Previous Stage Ib ([id number]) right breast cancer:  · Diagnosed May 2010 with excisional biopsy for invasive ductal carcinoma, 1.3 cm, grade 2, ER 90%, FL 80%, HER-2/peter negative (1+ IHC).    · Subsequent right mastectomy in July 2010 with no residual breast malignancy, 1/5 sentinel lymph node with micrometastasis (0.25 mm).    · Treated in the [facility] system with adjuvant AC ×4 cycles in 2010 (no taxanes administered due to underlying Charcot-Saloni-Tooth with peripheral neuropathy).    · Adjuvant Femara (postmenopausal) initiated October 2010 with plan to treat ×10 years.    · Genetic testing reportedly negative.    · Developed osteopenia treated with Prolia beginning 2/27/13. Subsequently discontinued due to identification of metastatic disease.  2. Recurrent/metastatic disease identified 10/8/17:  · Disease involving thoracic spine with cord compression at T6, lumbosacral involvement, sternal and right sternoclavicular involvement.    · Femara discontinued in 10/2017.    · Radiation administered (in the [facility] system) to the thoracic spine beginning 10/19/17 treating T3-T9 to a dose of 24 gray in 6 fractions.  · Evaluation with MRI 12/8/17 showing persistent T6 cord compression with persistent neurologic compromise requiring surgical treatment 12/11/17 with T6 laminectomy/corpectomy and T3-T9 fusion.  Pathology with metastatic carcinoma of breast origin, ER negative, FL negative, HER-2/peter negative (1+ IHC).  · Additional staging evaluation 12/8/17 with no evidence of visceral metastatic disease, bone scan showing involvement of thoracic spine, sternum, left humerus, mid frontal bone.  No plane film correlate of left humerus lesion.  MRI lumbar spine with small intradural L3 metastasis.  CA 15-3 12/6/17- 17.  · Palliative radiation therapy to L3 dural metastasis and left humerus initiated 1/15/18 (10 fractions), completed  1/26/18.  · Hypercalcemia of malignancy with calcium in the 10-11 range.  · Initiation of monthly Xgeva 1/23/18.  · Baseline CT scan 1/30/18 with no evidence of visceral involvement.  Cluster of nodular opacities in the right lower lobe suspected to be infectious or related to bronchiolitis. Bone scan 1/30/18 showed postsurgical change in the thoracic spine, stable uptake in the frontal bone, no new areas of disease.  · Initiation of palliative oral single agent Xeloda 2/7/18 2000 mg a.m., 1500 mg p.m. for 14/21 days.   · Following 3 cycles xeloda, bone scan 4/4/18 showed no change from the prior study.  CT scan 4/4/18 showed a small pericardial effusion of unclear significance as well as a subcutaneous nodule in the right lateral chest wall.  Subsequent evaluation with echocardiogram 4/17/18 showed no evidence of pericardial effusion.  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  We discussed the possibility of surgical excision of the right subcutaneous chest wall lesion for more definitive diagnosis.  Reviewed previous CT images dating back to 12/8/17 and the lesion was present even at that time measuring around 1.7 cm although not commented on in the radiology report.  As this appears to be an indolent low-grade process unrelated to her breast cancer, recommendee foregoing surgical excision at this time and monitoring this area on future scans.  The patient agreed.    · Following 6 cycles of Xeloda, CT 6/6/18  showed stable findings, no evidence of progressive disease.  There was a comment regarding subcutaneous abnormality in the anterior abdominal wall and this was related to Lovenox injection sites.  Bone scan 6/6/18 showed no interval change.   · CT scan and bone scan 8/13/18 following 9 cycles of Xeloda showed stable findings with no evidence of significant visceral  metastases.  Her bone lesions appear stable on bone scan.  The spindle cell neoplasm in her right chest wall actually decreased in size from 2 cm down to 1.6 cm.    · The patient experienced some symptoms of diarrhea, anorexia, generalized weakness during cycle 9 Xeloda it was unclear whether this was related to a viral gastroenteritis or toxicity from treatment.  Symptoms recurred during cycle 10 and treatment was cut short by 2 days.  Symptoms attributed to Xeloda.  With cycle 11, dose and schedule altered to 1500 mg twice daily for 7 days on followed by 7 days off .  · CT scan 9/9/2020 with no significant changes.  Bone scan 9/9/2020 read as unchanged from prior studies however did note an area of slight activity in the medial left femur.  Contacted radiology and although this was not noted on prior reports appears to have been present.  Subsequent MRI left femur 9/21/2020 with cortical thickening and periosteal edema left iliac us muscle insertion to the medial left femur with no evidence of metastatic disease, favored to represent periosteal change secondary to insertional tendinitis.  · Most recent 3-month interval scans from 5/17/2021 with CT chest abdomen pelvis, bone scan, CT head, CT cervical spine showed no change from previous studies, CT head could not reidentify the right frontal bone metastasis.  CT cervical spine with no evidence of metastatic disease, did show significant degenerative change, spondylosis, neuroforaminal and canal narrowing.  Notation of loosening of pedicle screw at T3 of unclear significance, referred to Dr. Nayak to review.  · The patient returns today continuing on treatment with Xeloda 1500 mg twice daily 7 days on followed by 7 days off.  She started her current week of Xeloda on 5/19/2021.  She is due today for monthly Xgeva.  She continues to tolerate treatment relatively well.  She does have ongoing fatigue from treatment however this is improved after beginning Provigil  200 mg daily through the supportive oncology clinic in early May.  For hand-foot symptoms are improved as she did resume using the triamcinolone cream prescribed by dermatology.  She was experiencing some headaches and pain in her cervical spine which appears to have been related to working over a computer for extended hours recently.  The pain has improved.  We did obtain CT head and cervical spine.  CT head could not reidentify the right frontal metastasis seen previously and there were no new findings.  CT cervical spine showed degenerative change neuroforaminal and canal narrowing, spondylosis.  There was a comment regarding loosening of the pedicle screw at T3 on the left and this is of unclear significance.  We will refer the patient back to Dr. Nayak in orthopedics to review the scan and determine whether any intervention is needed.  Her CT chest abdomen pelvis and bone scan shows stable findings.  We will therefore proceed on with further treatment as planned.  We will plan repeat CT scan and bone scan at a 3-month interval.  She will return in 4 weeks for nurse practitioner visit and Xgeva.  I will see her in 8 weeks when she is again due for Xgeva and then again in 12 weeks to review her scans when she is due for Xgeva.  In the interval she will continue on current dosing of Xeloda.  3. Right pulmonary embolism:  · Diagnosed on CT angiogram 10/21/17 involving small right lower lobe pulmonary artery.  Lower extremity Dopplers negative.  · Bilateral lower extremity Dopplers negative again 12/5/17.  · Received chronic Lovenox 1 mg/kg twice per day, transition to oral Eliquis in February 2019, continuing on Eliquis 5 mg twice daily.  4. Cancer related pain:  · Previously receiving Duragesic 50 µg patch every 72 hours along with Dilaudid 4 mg as needed for breakthrough pain  · The patient's pain improved over time and she was able to discontinue both Duragesic and Dilaudid in the interval.  · Patient does take  occasional Flexeril at bedtime due to back spasm/pain when she has been more active.  · The patient does have some occasional aggravation of her chronic back pain and does use tramadol 50 mg every 8 hours as needed  · Patient's pain is stable.  She does continue to use tramadol 50 mg every 12 hours as needed which has been effective.  5. Chemotherapy-induced diarrhea with subsequent C. difficile colitis in the setting of previous ulcerative colitis:  · Patient with reported history of ulcerative colitis, is not on active therapy.  · The patient developed initial diarrhea related to Xeloda at regular dosing.  · Symptoms improved on reduced dose Xeloda  · Flare of symptoms in October 2018 with apparent finding of C. difficile colitis by GI, treated with course of oral vancomycin with improvement in symptoms.  · Patient notes minimal intermittent diarrhea on Xeloda requiring occasional dosing of Imodium.  Recently however she notes that her bowel function has been fairly normal.   6. Traumatic left tibia/fibular fracture:  · Status post ORIF 12/6/17  · Specimen was sent for pathologic review, negative for evidence of malignancy  7. Hypercalcemia:  · Suspect hypercalcemia of malignancy, calcium in  10-11 range previously.  · Calcium normalized following initiation of monthly Xgeva on 1/23/18.  8. Chemotherapy-induced mucositis:  · Patient had a minimal degree of mucositis with cycle 2.  The patient has magic mouthwash to use as needed.  No subsequent mucositis.  9. Recurrent UTI, bladder wall thickening on CT:  · Patient had an enterococcal UTI on 3/2/18 sensitive to nitrofurantoin and received treatment, unclear how long.  · Recurrent UTI 3/20/18 with urine culture growing Klebsiella, initially treated with nitrofurantoin, transitioned to Levaquin.  · CT 4/4/18 with diffuse bladder wall thickening with increased nodular thickening at the left base.  Referral to urogynecology Dr. May Johnson.  She was placed on a  prophylactic dose of trimethoprim 100 mg daily, bladder wall thickening felt to be related to recent recurrent urinary tract infections.  · Patient with urinary symptoms, treated with course of Macrobid at the end of December 2018, urine culture however was negative 12/31/18.  · Patient was found to have Klebsiella UTI 7/29/2019 which was successfully treated with Macrobid with complete resolution of symptoms.  10.   Mobility:  · The patient underwent an intensive course of rehabilitation at [facility].  She graduated from her outpatient course November 2018.  · Overall the patient has improved dramatically in terms of mobility, now walking around 1 mile per day without assistance.  11.  Depression:  · The patient is continuing follow-up in the supportive oncology clinic and is currently continuing on Cymbalta 30 mg twice daily as well as gabapentin 300 mg twice daily.  · The patient feels that her depression symptoms are currently fairly well controlled.  She has seen some benefit from attending support groups at [place] which she plans to continue.  · The patient does continue follow-up in supportive oncology clinic.  12. 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  Symptoms did improve and she continued on the same dose.    · Patient was referred to dermatology and has been continuing on triamcinolone 0.1% cream used for 1 week on followed by 1 week off which led to some further improvement.   · Patient today returns with improvement in her palmar erythema and skin peeling.  She has been using her emollient cream frequently.  She apparently has been off of the triamcinolone and after seeing dermatology resume this on a regular basis and this has led to improvement.  13. Evidence of steatosis on scans with mild intermittent elevated  liver function studies:  · Liver function studies increased 8/20/19 with ALT 98, AST 70, normal total bilirubin.  · Negative viral hepatitis A, B, and C panel 8/23/18  · Likely related to hepatic steatosis.   · Subsequent improvement in LFTs  · Today, LFTs normal  14. Chemotherapy induced leukopenia:  · WBC today  7.92 with normal differential  15. GERD:  · The patient continues on omeprazole 40 mg daily (2 x 20 mg).  · The patient also continues to use Carafate 1 g 4 times daily  · Symptoms currently stable  16. Insomnia:  · Patient with prior paradoxical reaction to Benadryl  · Improved previously on temazepam 15 mg nightly as needed.   · Patient noted subsequently that temazepam was having no effect.   · Symptoms currently stable on gabapentin 300 mg in the evening and 300 mg at night  17. Health maintenance:  · Patient notes that she has a history of colon polyps as well as ulcerative colitis and was due for a follow-up colonoscopy on 9/12/2019.  We did discuss there is no necessity to pursue colonoscopy in the setting of her metastatic breast cancer.    · The patient did undergo colonoscopy on 2/7/2020 with findings of muscular hypertrophy and diverticulosis.   18. Right shoulder pain:  · Patient was evaluated by Dr Forrester.  She has experienced difficulty over a long time frame with right shoulder although she has not complained of this in prior visits to our office.  She reported difficulty with abduction.    · The patient did undergo MRI of her right shoulder on 11/21/2019 at an outside facility showing multiple abnormalities including supraspinatus tendinosis, labral tear but no evidence of metastatic disease.  · Patient developed pain recently in the left shoulder, was seen by orthopedics and underwent steroid injection last week which has led to some improvement.  Right shoulder is stable currently  19. Ocular changes in part related to Xeloda:  · Patient experienced a mild degree of blurred vision as  well as burning and pruritus.  · She was seen by her ophthalmologist and has been continuing on xiidra ophthalmic drops which have helped.  · Likely both issues are to some extent related to Xeloda.  · Symptoms currently stable to improved.  20. Elevated glucose:  · It is noted the patient's glucose at the last few visits has been in the high 100 range, postprandial.  · She has had a hemoglobin A1c that has been in the high 5-6 range in the past, on 5/1/2019 at 5.7  · Hemoglobin A1c on 5/7/2021 was improved at 5.2  21. COVID-19 vaccination  · Patient received the Pfizer vaccine, second dose administered on 4/2/2021 without side effects.     Plan:  1. Continue oral Xeloda 1500 mg twice a day 7 days on followed by 7 days off (currently Xeloda initiated 5/19/2021).  2. Monthly Xgeva today  3. Continue Eliquis 5 mg twice daily.  4. Continue use of emollient cream on the hands and feet and continue to wear socks and gloves at night as well as use of triamcinolone cream as prescribed by dermatology.  5. Continue omeprazole 40 mg daily and Carafate 1 g 4 times daily.    6. Continue xiidra ophthalmic drops prescribed by ophthalmology  7. Continue Provigil 200 mg daily per supportive oncology clinic.  Patient continues routine follow-up with supportive oncology.  8. Referral back to Dr. Nayak in orthopedics to evaluate findings on recent CT cervical spine that indicate loosening of T3 left pedicle screw.  9. In 4 weeks nurse practitioner visit with CBC, CMP, magnesium, phosphorus and Xgeva  10. In 8 weeks MD visit with CBC, CMP, magnesium, phosphorus, and Xgeva  11. 11 weeks CT chest abdomen pelvis and bone scan  12. In 12 weeks MD visit with CBC, CMP, magnesium, phosphorus and Xgeva.     Patient continuing on high risk medication requiring intensive monitoring.

## 2021-06-02 RX ORDER — TRAMADOL HYDROCHLORIDE 50 MG/1
TABLET ORAL
Qty: 90 TABLET | Refills: 0 | Status: SHIPPED | OUTPATIENT
Start: 2021-06-02 | End: 2021-07-16

## 2021-06-06 RX ORDER — GABAPENTIN 300 MG/1
CAPSULE ORAL
Qty: 180 CAPSULE | Refills: 3 | Status: SHIPPED | OUTPATIENT
Start: 2021-06-06 | End: 2021-11-15

## 2021-06-16 RX ORDER — CAPECITABINE 500 MG/1
TABLET, FILM COATED ORAL
Qty: 84 TABLET | Refills: 3 | Status: SHIPPED | OUTPATIENT
Start: 2021-06-16 | End: 2021-08-24 | Stop reason: SDUPTHER

## 2021-06-17 ENCOUNTER — APPOINTMENT (OUTPATIENT)
Dept: PSYCHIATRY | Facility: HOSPITAL | Age: 61
End: 2021-06-17

## 2021-06-21 ENCOUNTER — INFUSION (OUTPATIENT)
Dept: ONCOLOGY | Facility: HOSPITAL | Age: 61
End: 2021-06-21

## 2021-06-21 ENCOUNTER — LAB (OUTPATIENT)
Dept: OTHER | Facility: HOSPITAL | Age: 61
End: 2021-06-21

## 2021-06-21 ENCOUNTER — OFFICE VISIT (OUTPATIENT)
Dept: ONCOLOGY | Facility: CLINIC | Age: 61
End: 2021-06-21

## 2021-06-21 VITALS
TEMPERATURE: 98.4 F | SYSTOLIC BLOOD PRESSURE: 136 MMHG | HEIGHT: 62 IN | HEART RATE: 82 BPM | OXYGEN SATURATION: 96 % | RESPIRATION RATE: 18 BRPM | DIASTOLIC BLOOD PRESSURE: 84 MMHG | WEIGHT: 184.8 LBS | BODY MASS INDEX: 34.01 KG/M2

## 2021-06-21 DIAGNOSIS — L27.1 HAND FOOT SYNDROME: ICD-10-CM

## 2021-06-21 DIAGNOSIS — C50.811 MALIGNANT NEOPLASM OF OVERLAPPING SITES OF RIGHT BREAST IN FEMALE, ESTROGEN RECEPTOR NEGATIVE (HCC): ICD-10-CM

## 2021-06-21 DIAGNOSIS — C50.811 MALIGNANT NEOPLASM OF OVERLAPPING SITES OF RIGHT BREAST IN FEMALE, ESTROGEN RECEPTOR NEGATIVE (HCC): Primary | ICD-10-CM

## 2021-06-21 DIAGNOSIS — C79.51 BONE METASTASES: ICD-10-CM

## 2021-06-21 DIAGNOSIS — Z17.1 MALIGNANT NEOPLASM OF OVERLAPPING SITES OF RIGHT BREAST IN FEMALE, ESTROGEN RECEPTOR NEGATIVE (HCC): Primary | ICD-10-CM

## 2021-06-21 DIAGNOSIS — Z79.899 HIGH RISK MEDICATION USE: ICD-10-CM

## 2021-06-21 DIAGNOSIS — Z17.1 MALIGNANT NEOPLASM OF OVERLAPPING SITES OF RIGHT BREAST IN FEMALE, ESTROGEN RECEPTOR NEGATIVE (HCC): ICD-10-CM

## 2021-06-21 LAB
ALBUMIN SERPL-MCNC: 3.9 G/DL (ref 3.5–5.2)
ALBUMIN/GLOB SERPL: 1.7 G/DL
ALP SERPL-CCNC: 62 U/L (ref 39–117)
ALT SERPL W P-5'-P-CCNC: 15 U/L (ref 1–33)
ANION GAP SERPL CALCULATED.3IONS-SCNC: 7.2 MMOL/L (ref 5–15)
AST SERPL-CCNC: 19 U/L (ref 1–32)
BASOPHILS # BLD AUTO: 0.03 10*3/MM3 (ref 0–0.2)
BASOPHILS NFR BLD AUTO: 1.1 % (ref 0–1.5)
BILIRUB SERPL-MCNC: 0.6 MG/DL (ref 0–1.2)
BUN SERPL-MCNC: 23 MG/DL (ref 8–23)
BUN/CREAT SERPL: 27.4 (ref 7–25)
CALCIUM SPEC-SCNC: 9.2 MG/DL (ref 8.6–10.5)
CHLORIDE SERPL-SCNC: 104 MMOL/L (ref 98–107)
CO2 SERPL-SCNC: 30.8 MMOL/L (ref 22–29)
CREAT SERPL-MCNC: 0.84 MG/DL (ref 0.57–1)
DEPRECATED RDW RBC AUTO: 61.5 FL (ref 37–54)
EOSINOPHIL # BLD AUTO: 0.08 10*3/MM3 (ref 0–0.4)
EOSINOPHIL NFR BLD AUTO: 3 % (ref 0.3–6.2)
ERYTHROCYTE [DISTWIDTH] IN BLOOD BY AUTOMATED COUNT: 16.5 % (ref 12.3–15.4)
GFR SERPL CREATININE-BSD FRML MDRD: 69 ML/MIN/1.73
GLOBULIN UR ELPH-MCNC: 2.3 GM/DL
GLUCOSE SERPL-MCNC: 113 MG/DL (ref 65–99)
HCT VFR BLD AUTO: 39.1 % (ref 34–46.6)
HGB BLD-MCNC: 12.6 G/DL (ref 12–15.9)
IMM GRANULOCYTES # BLD AUTO: 0 10*3/MM3 (ref 0–0.05)
IMM GRANULOCYTES NFR BLD AUTO: 0 % (ref 0–0.5)
LYMPHOCYTES # BLD AUTO: 0.74 10*3/MM3 (ref 0.7–3.1)
LYMPHOCYTES NFR BLD AUTO: 27.9 % (ref 19.6–45.3)
MAGNESIUM SERPL-MCNC: 2 MG/DL (ref 1.6–2.4)
MCH RBC QN AUTO: 32.6 PG (ref 26.6–33)
MCHC RBC AUTO-ENTMCNC: 32.2 G/DL (ref 31.5–35.7)
MCV RBC AUTO: 101 FL (ref 79–97)
MONOCYTES # BLD AUTO: 0.2 10*3/MM3 (ref 0.1–0.9)
MONOCYTES NFR BLD AUTO: 7.5 % (ref 5–12)
NEUTROPHILS NFR BLD AUTO: 1.6 10*3/MM3 (ref 1.7–7)
NEUTROPHILS NFR BLD AUTO: 60.5 % (ref 42.7–76)
NRBC BLD AUTO-RTO: 0 /100 WBC (ref 0–0.2)
PHOSPHATE SERPL-MCNC: 3.7 MG/DL (ref 2.5–4.5)
PLATELET # BLD AUTO: 182 10*3/MM3 (ref 140–450)
PMV BLD AUTO: 10.2 FL (ref 6–12)
POTASSIUM SERPL-SCNC: 4.5 MMOL/L (ref 3.5–5.2)
PROT SERPL-MCNC: 6.2 G/DL (ref 6–8.5)
RBC # BLD AUTO: 3.87 10*6/MM3 (ref 3.77–5.28)
SODIUM SERPL-SCNC: 142 MMOL/L (ref 136–145)
WBC # BLD AUTO: 2.65 10*3/MM3 (ref 3.4–10.8)

## 2021-06-21 PROCEDURE — 99214 OFFICE O/P EST MOD 30 MIN: CPT | Performed by: NURSE PRACTITIONER

## 2021-06-21 PROCEDURE — 84100 ASSAY OF PHOSPHORUS: CPT

## 2021-06-21 PROCEDURE — 36415 COLL VENOUS BLD VENIPUNCTURE: CPT

## 2021-06-21 PROCEDURE — 83735 ASSAY OF MAGNESIUM: CPT

## 2021-06-21 PROCEDURE — 80053 COMPREHEN METABOLIC PANEL: CPT

## 2021-06-21 PROCEDURE — 85025 COMPLETE CBC W/AUTO DIFF WBC: CPT

## 2021-06-21 PROCEDURE — 25010000002 DENOSUMAB 120 MG/1.7ML SOLUTION: Performed by: NURSE PRACTITIONER

## 2021-06-21 PROCEDURE — 96372 THER/PROPH/DIAG INJ SC/IM: CPT

## 2021-06-21 RX ADMIN — DENOSUMAB 120 MG: 120 INJECTION SUBCUTANEOUS at 12:03

## 2021-06-21 NOTE — PROGRESS NOTES
"Chief Complaint  Previous Stage Ib ([id number]) ER/FL positive, HER-2/peter negative right breast cancer with subsequent metastatic disease identified 10/8/2017, history of right pulmonary embolism, cancer related pain, chemotherapy-induced diarrhea, chemotherapy-induced mucositis, chemotherapy-induced hand-foot syndrome    Subjective        History of Present Illness  The patient returns today in follow-up continuing on oral Xeloda 1500 mg twice daily for 7 days on followed by 7 days off as well as monthly Xgeva and Eliquis 5 mg twice daily.  Patient reports pain in her left shoulder, low back bilaterally, and intermittent right subclavicular pain.  She states sometimes she wakes up in the morning and her neck is bothering her.  She is concerned some of this could be related to loosening screw in T3 and has upcoming appoint with orthopedics in the beginning of July.  She denies mouth sores, trouble with her bowels, or nausea.  She does notice some stiffening of her hands over the past couple of months.  She states at times her fingers feel stuck and she has to manually move them.  She continues with a palmar erythema and skin thickening with a small amount of peeling.  She uses emollients to her hands daily as well as the triamcinolone cream.  She has noticed some loss of muscle on the right hand between her thumb and index finger as well as in her forearm.  Her asthma feels this is more so related to her CMT.  She denies recent fevers.    Objective   Vital Signs:   /84   Pulse 82   Temp 98.4 °F (36.9 °C) (Temporal)   Resp 18   Ht 157.5 cm (62.01\")   Wt 83.8 kg (184 lb 12.8 oz)   SpO2 96%   BMI 33.79 kg/m²     Physical Exam  Constitutional:       Appearance: She is well-developed.   Eyes:      Conjunctiva/sclera: Conjunctivae normal.   Neck:      Thyroid: No thyromegaly.   Cardiovascular:      Rate and Rhythm: Normal rate and regular rhythm.      Heart sounds: No murmur heard.   No friction rub. No " gallop.    Pulmonary:      Effort: No respiratory distress.      Breath sounds: Normal breath sounds.   Musculoskeletal:      Right hand: Decreased range of motion.      Left hand: Decreased range of motion.      Comments: Right hand with muscle loss noted between thumb and index finer and in forearm.   Lymphadenopathy:      Head:      Right side of head: No submandibular adenopathy.      Cervical: No cervical adenopathy.      Upper Body:      Right upper body: No supraclavicular adenopathy.      Left upper body: No supraclavicular adenopathy.   Skin:     General: Skin is warm and dry.      Findings: Rash present.      Comments: Significant bilateral palmar erythema which extends to the dorsal aspect of the hands also.  Small amount of skin peeling noted.  Fingers not straightened at rest, thickening noted to skin.   Neurological:      Mental Status: She is alert and oriented to person, place, and time.   Psychiatric:         Behavior: Behavior normal.        Result Review : Reviewed Phosphorus (06/21/2021 11:15)  Magnesium (06/21/2021 11:15)  Comprehensive metabolic panel (06/21/2021 11:15)  CBC and Differential (06/21/2021 11:15)         Assessment and Plan     1. Previous Stage Ib ([id number]) right breast cancer:  · Diagnosed May 2010 with excisional biopsy for invasive ductal carcinoma, 1.3 cm, grade 2, ER 90%, NJ 80%, HER-2/peter negative (1+ IHC).    · Subsequent right mastectomy in July 2010 with no residual breast malignancy, 1/5 sentinel lymph node with micrometastasis (0.25 mm).    · Treated in the [facility] system with adjuvant AC ×4 cycles in 2010 (no taxanes administered due to underlying Charcot-Saloni-Tooth with peripheral neuropathy).    · Adjuvant Femara (postmenopausal) initiated October 2010 with plan to treat ×10 years.    · Genetic testing reportedly negative.    · Developed osteopenia treated with Prolia beginning 2/27/13. Subsequently discontinued due to identification of metastatic  disease.  2. Recurrent/metastatic disease identified 10/8/17:  · Disease involving thoracic spine with cord compression at T6, lumbosacral involvement, sternal and right sternoclavicular involvement.    · Femara discontinued in 10/2017.    · Radiation administered (in the [facility] system) to the thoracic spine beginning 10/19/17 treating T3-T9 to a dose of 24 gray in 6 fractions.  · Evaluation with MRI 12/8/17 showing persistent T6 cord compression with persistent neurologic compromise requiring surgical treatment 12/11/17 with T6 laminectomy/corpectomy and T3-T9 fusion.  Pathology with metastatic carcinoma of breast origin, ER negative, UT negative, HER-2/peter negative (1+ IHC).  · Additional staging evaluation 12/8/17 with no evidence of visceral metastatic disease, bone scan showing involvement of thoracic spine, sternum, left humerus, mid frontal bone.  No plane film correlate of left humerus lesion.  MRI lumbar spine with small intradural L3 metastasis.  CA 15-3 12/6/17- 17.  · Palliative radiation therapy to L3 dural metastasis and left humerus initiated 1/15/18 (10 fractions), completed 1/26/18.  · Hypercalcemia of malignancy with calcium in the 10-11 range.  · Initiation of monthly Xgeva 1/23/18.  · Baseline CT scan 1/30/18 with no evidence of visceral involvement.  Cluster of nodular opacities in the right lower lobe suspected to be infectious or related to bronchiolitis. Bone scan 1/30/18 showed postsurgical change in the thoracic spine, stable uptake in the frontal bone, no new areas of disease.  · Initiation of palliative oral single agent Xeloda 2/7/18 2000 mg a.m., 1500 mg p.m. for 14/21 days.   · Following 3 cycles xeloda, bone scan 4/4/18 showed no change from the prior study.  CT scan 4/4/18 showed a small pericardial effusion of unclear significance as well as a subcutaneous nodule in the right lateral chest wall.  Subsequent evaluation with echocardiogram 4/17/18 showed no evidence of pericardial  effusion.  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  We discussed the possibility of surgical excision of the right subcutaneous chest wall lesion for more definitive diagnosis.  Reviewed previous CT images dating back to 12/8/17 and the lesion was present even at that time measuring around 1.7 cm although not commented on in the radiology report.  As this appears to be an indolent low-grade process unrelated to her breast cancer, recommendee foregoing surgical excision at this time and monitoring this area on future scans.  The patient agreed.    · Following 6 cycles of Xeloda, CT 6/6/18  showed stable findings, no evidence of progressive disease.  There was a comment regarding subcutaneous abnormality in the anterior abdominal wall and this was related to Lovenox injection sites.  Bone scan 6/6/18 showed no interval change.   · CT scan and bone scan 8/13/18 following 9 cycles of Xeloda showed stable findings with no evidence of significant visceral metastases.  Her bone lesions appear stable on bone scan.  The spindle cell neoplasm in her right chest wall actually decreased in size from 2 cm down to 1.6 cm.    · The patient experienced some symptoms of diarrhea, anorexia, generalized weakness during cycle 9 Xeloda it was unclear whether this was related to a viral gastroenteritis or toxicity from treatment.  Symptoms recurred during cycle 10 and treatment was cut short by 2 days.  Symptoms attributed to Xeloda.  With cycle 11, dose and schedule altered to 1500 mg twice daily for 7 days on followed by 7 days off .  · CT scan 9/9/2020 with no significant changes.  Bone scan 9/9/2020 read as unchanged from prior studies however did note an area of slight activity in the medial left femur.  Contacted radiology and although this was not noted on prior reports appears to have  been present.  Subsequent MRI left femur 9/21/2020 with cortical thickening and periosteal edema left iliac us muscle insertion to the medial left femur with no evidence of metastatic disease, favored to represent periosteal change secondary to insertional tendinitis.  · Most recent 3-month interval scans from 5/17/2021 with CT chest abdomen pelvis, bone scan, CT head, CT cervical spine showed no change from previous studies, CT head could not reidentify the right frontal bone metastasis.  CT cervical spine with no evidence of metastatic disease, did show significant degenerative change, spondylosis, neuroforaminal and canal narrowing.  Notation of loosening of pedicle screw at T3 of unclear significance, referred to Dr. Nayak to review.  · The patient returns today continuing on treatment with Xeloda 1500 mg twice daily 7 days on followed by 7 days off.  She started her current week of Xeloda on 6/16/2021.  She continues on Provigil 200 mg daily through the supportive oncology clinic in early May.  She is having some intermittent fleeting right shoulder to subscapular discomfort at times.  This does not affect function or activity.  She is having pain in the left shoulder as well as her low back and she has been gardening more recently.  She is awaiting appoint with orthopedics in early July to evaluate the loosening of the pedicle screw at T3.  She has noted some stiffening of her hands over the past couple of months and does continue the treatments alone and emollient daily.  This is discussed further below.  We will plan repeat CT scan and bone scan at a 3-month interval from previous.  We will proceed with Xgeva today.  She will return in 4 weeks for review by Dr. Hancock and Lawrence and then again in 8 weeks to review her scans when she is due for Xgeva.  She will continue her current Xeloda, with her last dose of the week tomorrow.  Her palmar changes will be discussed with Dr. Hancock otherwise we will plan to  continue her current dosing on Xeloda.  3. Right pulmonary embolism:  · Diagnosed on CT angiogram 10/21/17 involving small right lower lobe pulmonary artery.  Lower extremity Dopplers negative.  · Bilateral lower extremity Dopplers negative again 12/5/17.  · Received chronic Lovenox 1 mg/kg twice per day, transition to oral Eliquis in February 2019, continuing on Eliquis 5 mg twice daily.  4. Cancer related pain:  · Previously receiving Duragesic 50 µg patch every 72 hours along with Dilaudid 4 mg as needed for breakthrough pain  · The patient's pain improved over time and she was able to discontinue both Duragesic and Dilaudid in the interval.  · Patient does take occasional Flexeril at bedtime due to back spasm/pain when she has been more active.  · The patient does have some occasional aggravation of her chronic back pain and does use tramadol 50 mg every 8 hours as needed  · Patient's pain is stable.  She does continue to use tramadol 50 mg every 12 hours as needed which has been effective.  5. Chemotherapy-induced diarrhea with subsequent C. difficile colitis in the setting of previous ulcerative colitis:  · Patient with reported history of ulcerative colitis, is not on active therapy.  · The patient developed initial diarrhea related to Xeloda at regular dosing.  · Symptoms improved on reduced dose Xeloda  · Flare of symptoms in October 2018 with apparent finding of C. difficile colitis by GI, treated with course of oral vancomycin with improvement in symptoms.  · Patient notes minimal intermittent diarrhea on Xeloda requiring occasional dosing of Imodium.  Recently however she notes that her bowel function has been fairly normal.   6. Traumatic left tibia/fibular fracture:  · Status post ORIF 12/6/17  · Specimen was sent for pathologic review, negative for evidence of malignancy  7. Hypercalcemia:  · Suspect hypercalcemia of malignancy, calcium in  10-11 range previously.  · Calcium normalized following  initiation of monthly Xgeva on 1/23/18.  8. Chemotherapy-induced mucositis:  · Patient had a minimal degree of mucositis with cycle 2.  The patient has magic mouthwash to use as needed.  No subsequent mucositis.  9. Recurrent UTI, bladder wall thickening on CT:  · Patient had an enterococcal UTI on 3/2/18 sensitive to nitrofurantoin and received treatment, unclear how long.  · Recurrent UTI 3/20/18 with urine culture growing Klebsiella, initially treated with nitrofurantoin, transitioned to Levaquin.  · CT 4/4/18 with diffuse bladder wall thickening with increased nodular thickening at the left base.  Referral to urogynecology Dr. May Johnson.  She was placed on a prophylactic dose of trimethoprim 100 mg daily, bladder wall thickening felt to be related to recent recurrent urinary tract infections.  · Patient with urinary symptoms, treated with course of Macrobid at the end of December 2018, urine culture however was negative 12/31/18.  · Patient was found to have Klebsiella UTI 7/29/2019 which was successfully treated with Macrobid with complete resolution of symptoms.  10.   Mobility:  · The patient underwent an intensive course of [facility] at [facility].  She graduated from her outpatient course November 2018.  · Overall the patient has improved dramatically in terms of mobility, now walking around 1 mile per day without assistance.  11.  Depression:  · The patient is continuing follow-up in the supportive oncology clinic and is currently continuing on Cymbalta 30 mg twice daily as well as gabapentin 300 mg twice daily.  · The patient feels that her depression symptoms are currently fairly well controlled.  She has seen some benefit from attending support groups at [facility] which she plans to continue.  · The patient does continue follow-up in supportive oncology clinic.  12. Hand-foot syndrome secondary to Xeloda:  · Patient continues with frequent application of emollient cream to the hands and  feet  · Symptoms increased significantly requiring a 1 week delay in cycle 18 Xeloda as noted above.  Symptoms did improve and she continued on the same dose.    · Patient was referred to dermatology and has been continuing on triamcinolone 0.1% cream used for 1 week on followed by 1 week off which led to some further improvement.   · Patient does report some stiffening of her hands over the past couple of months.  She continues with her palmar erythema and intermittent skin peeling.  She is using the triamcinolone and emollient.  She reports some stiffening of her fingers at times.  I placed pictures in the chart will discuss with Dr. Hancock.  13. Evidence of steatosis on scans with mild intermittent elevated liver function studies:  · Liver function studies increased 8/20/19 with ALT 98, AST 70, normal total bilirubin.  · Negative viral hepatitis A, B, and C panel 8/23/18  · Likely related to hepatic steatosis.   · Subsequent improvement in LFTs  · LFTs in normal ranges today.  14. Chemotherapy induced leukopenia:  · WBC today  7.92 with normal differential  15. GERD:  · The patient continues on omeprazole 40 mg daily (2 x 20 mg).  · The patient also continues to use Carafate 1 g 4 times daily  · Symptoms currently stable  16. Insomnia:  · Patient with prior paradoxical reaction to Benadryl  · Improved previously on temazepam 15 mg nightly as needed.   · Patient noted subsequently that temazepam was having no effect.   · Symptoms currently stable on gabapentin 300 mg in the evening and 300 mg at night  17. Health maintenance:  · Patient notes that she has a history of colon polyps as well as ulcerative colitis and was due for a follow-up colonoscopy on 9/12/2019.  We did discuss there is no necessity to pursue colonoscopy in the setting of her metastatic breast cancer.    · The patient did undergo colonoscopy on 2/7/2020 with findings of muscular hypertrophy and diverticulosis.   18. Right shoulder pain:  · Patient  was evaluated by Dr Forrester.  She has experienced difficulty over a long time frame with right shoulder although she has not complained of this in prior visits to our office.  She reported difficulty with abduction.    · The patient did undergo MRI of her right shoulder on 11/21/2019 at an outside facility showing multiple abnormalities including supraspinatus tendinosis, labral tear but no evidence of metastatic disease.  · Patient developed pain recently in the left shoulder, was seen by orthopedics and underwent steroid injection last week which has led to some improvement. \  · Patient reports on 6/21/2021 some intermittent right shoulder pain that is fleeting in nature.  We will continue to monitor.  19. Ocular changes in part related to Xeloda:  · Patient experienced a mild degree of blurred vision as well as burning and pruritus.  · She was seen by her ophthalmologist and has been continuing on xiidra ophthalmic drops which have helped.  · Likely both issues are to some extent related to Xeloda.  · Symptoms currently stable to improved.  20. Elevated glucose:  · It is noted the patient's glucose at the last few visits has been in the high 100 range, postprandial.  · She has had a hemoglobin A1c that has been in the high 5-6 range in the past, on 5/1/2019 at 5.7  · Hemoglobin A1c on 5/7/2021 was improved at 5.2  21. COVID-19 vaccination  · Patient received the Pfizer vaccine, second dose administered on 4/2/2021 without side effects.     Plan:  1. Continue oral Xeloda 1500 mg twice a day 7 days on followed by 7 days off.  She will complete her week on tomorrow.   2. We will discuss pulmonary erythema and skin changes with Dr. Hancock.  3. Monthly Xgeva today  4. Continue Eliquis 5 mg twice daily.  5. Continue use of emollient cream on the hands and feet and continue to wear socks and gloves at night as well as use of triamcinolone cream as prescribed by dermatology.  6. Continue omeprazole 40 mg daily and  Carafate 1 g 4 times daily.    7. Continue xiidra ophthalmic drops prescribed by ophthalmology  8. Continue Provigil 200 mg daily per supportive oncology clinic.  Patient continues routine follow-up with supportive oncology.  9. Upcoming appointment with Dr. Nayak in orthopedics to evaluate findings on recent CT cervical spine that indicate loosening of T3 left pedicle screw.  10. In 4 weeks MD visit with CBC, CMP, magnesium, phosphorus, and Xgeva  11. 7 weeks CT chest abdomen pelvis and bone scan  12. In 8 weeks MD visit with CBC, CMP, magnesium, phosphorus and Xgeva.     Patient continuing on high risk medication requiring intensive monitoring.              ADDENDUM: Per discussion with Dr. Hancock, will trial urea cream to her hands 3 times daily for the thickening skin and peeling.  Patient also to make an appointment back with dermatology to discuss any other options.  Patient should follow up in 1 week to see if there is been improvement in her symptoms prior to restarting Xeloda next Wednesday.  Urea cream was sent to her local pharmacy that was listed, [facility].  I did attempt to call the patient and left a voicemail.  Message sent to Tiffany Maria RN.

## 2021-06-22 ENCOUNTER — TELEPHONE (OUTPATIENT)
Dept: PHARMACY | Facility: HOSPITAL | Age: 61
End: 2021-06-22

## 2021-06-22 ENCOUNTER — TELEPHONE (OUTPATIENT)
Dept: ONCOLOGY | Facility: CLINIC | Age: 61
End: 2021-06-22

## 2021-06-22 NOTE — TELEPHONE ENCOUNTER
----- Message from LISA Mckenzie sent at 6/21/2021  4:51 PM EDT -----  Regarding: apt  Please add an appointment for this patient in 1 week at [facility] with Tiffany to evaluate hands and repeat CBC.

## 2021-06-22 NOTE — TELEPHONE ENCOUNTER
[facility] Refill Outreach    Medication: [facility]    Called patient to arrange shipment of medication. Patient did not answer, left voicemail with my direct call back number [phone].     Yolanda Meyers, Pharmacy Technician  6/22/2021  09:23 EDT

## 2021-06-22 NOTE — TELEPHONE ENCOUNTER
Called pt. Informed her of message below. She V/U. She asked that the Urea cream be sent to [facility]. E-scribe sent to LISA Cardoza for signature.   ----- Message from LISA Mckenzie sent at 6/21/2021  4:50 PM EDT -----  Regarding: call pt  I attempted to call the patient this afternoon but got her voicemail and asked her to return my phone call to either me or you tomorrow.  Per discussion with Dr. Hancock, will trial urea cream to her hands 3 times daily for the thickening skin and peeling.  Patient also to make an appointment back with dermatology to discuss any other options.  Patient should follow up in 1 week to see if there is been improvement in her symptoms prior to restarting Xeloda next Wednesday.  Urea cream was sent to her local pharmacy that was listed, [facility].  I have sent a message to scheduling to make an appointment for next week.

## 2021-06-22 NOTE — TELEPHONE ENCOUNTER
[facility] Refill Outreach    Called regarding refill of medication: [facility]  Spoke with patient.    Assessment  • It looks like it is almost time for your refill, how many doses do you have left? UNKNOWN  • How are you doing on the medication, are you experiencing any side effects? Patient denies side effects   • How many doses have you missed since you last filled your prescription? 0 doses missed.    Shipment   • We can go ahead and coordinate your next refill, would you like to pick this up at the pharmacy/curbside, or have this delivered to you vs. the clinic?   o If government plan (Medicaid and Medicare B), delivery will require signature -> identify a day the patient will be home for delivery set up (note the package should arrive prior to noon as pharmacy will ship priority overnight)  • Patient was advised medication will be shipped via [employer] (priority, signature required) on 6/24/21 with expected delivery on 6/25/21. Shipping comments patient is requesting of [employer] : NONE. .   • Shipping address confirmed: HOME: [address] [place] [address] [address] [place] [address]  • Patient is aware and willing to pay copay of $0.   • Do you have any questions for the pharmacist? No.  • Ensured patient has clinic contact information for questions.     Yolanda Meyers, Pharmacy Technician  6/22/2021  10:07 EDT

## 2021-06-28 ENCOUNTER — LAB (OUTPATIENT)
Dept: OTHER | Facility: HOSPITAL | Age: 61
End: 2021-06-28

## 2021-06-28 ENCOUNTER — OFFICE VISIT (OUTPATIENT)
Dept: ONCOLOGY | Facility: CLINIC | Age: 61
End: 2021-06-28

## 2021-06-28 VITALS
SYSTOLIC BLOOD PRESSURE: 142 MMHG | HEIGHT: 62 IN | HEART RATE: 93 BPM | DIASTOLIC BLOOD PRESSURE: 86 MMHG | RESPIRATION RATE: 16 BRPM | TEMPERATURE: 97.1 F | OXYGEN SATURATION: 95 % | WEIGHT: 188.7 LBS | BODY MASS INDEX: 34.72 KG/M2

## 2021-06-28 DIAGNOSIS — C50.811 MALIGNANT NEOPLASM OF OVERLAPPING SITES OF RIGHT BREAST IN FEMALE, ESTROGEN RECEPTOR NEGATIVE (HCC): Primary | ICD-10-CM

## 2021-06-28 DIAGNOSIS — L27.1 PALMAR PLANTAR ERYTHRODYSAESTHESIA DUE TO CYTOTOXIC THERAPY: ICD-10-CM

## 2021-06-28 DIAGNOSIS — C50.811 MALIGNANT NEOPLASM OF OVERLAPPING SITES OF RIGHT BREAST IN FEMALE, ESTROGEN RECEPTOR NEGATIVE (HCC): ICD-10-CM

## 2021-06-28 DIAGNOSIS — Z17.1 MALIGNANT NEOPLASM OF OVERLAPPING SITES OF RIGHT BREAST IN FEMALE, ESTROGEN RECEPTOR NEGATIVE (HCC): ICD-10-CM

## 2021-06-28 DIAGNOSIS — Z17.1 MALIGNANT NEOPLASM OF OVERLAPPING SITES OF RIGHT BREAST IN FEMALE, ESTROGEN RECEPTOR NEGATIVE (HCC): Primary | ICD-10-CM

## 2021-06-28 LAB
BASOPHILS # BLD AUTO: 0.05 10*3/MM3 (ref 0–0.2)
BASOPHILS NFR BLD AUTO: 0.5 % (ref 0–1.5)
DEPRECATED RDW RBC AUTO: 60.2 FL (ref 37–54)
EOSINOPHIL # BLD AUTO: 0.06 10*3/MM3 (ref 0–0.4)
EOSINOPHIL NFR BLD AUTO: 0.6 % (ref 0.3–6.2)
ERYTHROCYTE [DISTWIDTH] IN BLOOD BY AUTOMATED COUNT: 17 % (ref 12.3–15.4)
HCT VFR BLD AUTO: 36.7 % (ref 34–46.6)
HGB BLD-MCNC: 12.1 G/DL (ref 12–15.9)
IMM GRANULOCYTES # BLD AUTO: 0.05 10*3/MM3 (ref 0–0.05)
IMM GRANULOCYTES NFR BLD AUTO: 0.5 % (ref 0–0.5)
LYMPHOCYTES # BLD AUTO: 0.77 10*3/MM3 (ref 0.7–3.1)
LYMPHOCYTES NFR BLD AUTO: 7.5 % (ref 19.6–45.3)
MCH RBC QN AUTO: 32.8 PG (ref 26.6–33)
MCHC RBC AUTO-ENTMCNC: 33 G/DL (ref 31.5–35.7)
MCV RBC AUTO: 99.5 FL (ref 79–97)
MONOCYTES # BLD AUTO: 0.69 10*3/MM3 (ref 0.1–0.9)
MONOCYTES NFR BLD AUTO: 6.7 % (ref 5–12)
NEUTROPHILS NFR BLD AUTO: 8.66 10*3/MM3 (ref 1.7–7)
NEUTROPHILS NFR BLD AUTO: 84.2 % (ref 42.7–76)
NRBC BLD AUTO-RTO: 0 /100 WBC (ref 0–0.2)
PLATELET # BLD AUTO: 229 10*3/MM3 (ref 140–450)
PMV BLD AUTO: 10.8 FL (ref 6–12)
RBC # BLD AUTO: 3.69 10*6/MM3 (ref 3.77–5.28)
WBC # BLD AUTO: 10.28 10*3/MM3 (ref 3.4–10.8)

## 2021-06-28 PROCEDURE — 99213 OFFICE O/P EST LOW 20 MIN: CPT | Performed by: NURSE PRACTITIONER

## 2021-06-28 PROCEDURE — 85025 COMPLETE CBC W/AUTO DIFF WBC: CPT | Performed by: NURSE PRACTITIONER

## 2021-06-28 PROCEDURE — 36415 COLL VENOUS BLD VENIPUNCTURE: CPT

## 2021-06-28 NOTE — PROGRESS NOTES
"Chief Complaint  Previous Stage Ib ([id number]) ER/MT positive, HER-2/peter negative right breast cancer with subsequent metastatic disease identified 10/8/2017, history of right pulmonary embolism, cancer related pain, chemotherapy-induced diarrhea, chemotherapy-induced mucositis, chemotherapy-induced hand-foot syndrome    Subjective        History of Present Illness    Ms. Davey is seen back today in short-term follow-up.  She was just seen last week for monthly Xgeva along with follow-up on Xeloda, taking 1500 mg twice daily for 7 days on followed by 7 days off.  She completed her most recent on week as of 6/22/2021.  When seen last week she was having more difficulty with palmar erythema and skin thickening with some peeling noted.  Counts were also borderline with ANC of 1.6.  We requested that she return today for reevaluation of her skin prior to reinitiating Xeloda as well as review of CBC counts.    As she is reviewed back today she does have noted improvement in her hands, with skin on the palms notably softer and minimal cracking at this point.  She notes she is using a combination of recently prescribed urea along with a CBD oil salve and together this has really helped.    She also has obtained a squeeze ball trying to improve dexterity of her hands and feels this is helping.    We reviewed her counts noting that her WBC count has recovered to 10.2.  She does typically have fluctuation in her counts in relation to being on Xeloda.    Additionally, when seen last week she was noting more pain in her left shoulder and low back.  Patient is concerned about this being related to some loosening of the screws at T3 and is scheduled to see Dr. Nayak next week.    Otherwise she denies new concerns today.      Objective   Vital Signs:   /86   Pulse 93   Temp 97.1 °F (36.2 °C) (Skin)   Resp 16   Ht 157.5 cm (62.01\")   Wt 85.6 kg (188 lb 11.2 oz)   SpO2 95%   BMI 34.50 kg/m²     Physical " Exam  Constitutional:       Appearance: She is well-developed.   Eyes:      Conjunctiva/sclera: Conjunctivae normal.   Neck:      Thyroid: No thyromegaly.   Cardiovascular:      Rate and Rhythm: Normal rate and regular rhythm.      Heart sounds: No murmur heard.   No friction rub. No gallop.    Pulmonary:      Effort: No respiratory distress.      Breath sounds: Normal breath sounds.   Musculoskeletal:      Right hand: Decreased range of motion.      Left hand: Decreased range of motion.      Comments: Somewhat improved range of motion comparatively.   Lymphadenopathy:      Head:      Right side of head: No submandibular adenopathy.      Cervical: No cervical adenopathy.      Upper Body:      Right upper body: No supraclavicular adenopathy.      Left upper body: No supraclavicular adenopathy.   Skin:     General: Skin is warm and dry.      Findings: Rash present.      Comments: Bilateral palmar erythema which extends to the dorsal aspect of the hands also.  Skin of bilateral palm is more soft with minimal skin peeling at this point (improved). Fingers not straightened at rest, thickening noted to skin.   Neurological:      Mental Status: She is alert and oriented to person, place, and time.   Psychiatric:         Behavior: Behavior normal.        Result Review :  Results from last 7 days   Lab Units 06/28/21  1026 06/21/21  1115   WBC 10*3/mm3 10.28 2.65*   NEUTROS ABS 10*3/mm3 8.66* 1.60*   HEMOGLOBIN g/dL 12.1 12.6   HEMATOCRIT % 36.7 39.1   PLATELETS 10*3/mm3 229 182     Results from last 7 days   Lab Units 06/21/21  1115   SODIUM mmol/L 142   POTASSIUM mmol/L 4.5   CHLORIDE mmol/L 104   CO2 mmol/L 30.8*   BUN mg/dL 23   CREATININE mg/dL 0.84   CALCIUM mg/dL 9.2   ALBUMIN g/dL 3.90   BILIRUBIN mg/dL 0.6   ALK PHOS U/L 62   ALT (SGPT) U/L 15   AST (SGOT) U/L 19   GLUCOSE mg/dL 113*   MAGNESIUM mg/dL 2.0              Assessment and Plan     1. Previous Stage Ib ([id number]) right breast cancer:  · Diagnosed May  2010 with excisional biopsy for invasive ductal carcinoma, 1.3 cm, grade 2, ER 90%, TN 80%, HER-2/peter negative (1+ IHC).    · Subsequent right mastectomy in July 2010 with no residual breast malignancy, 1/5 sentinel lymph node with micrometastasis (0.25 mm).    · Treated in the [facility] system with adjuvant AC ×4 cycles in 2010 (no taxanes administered due to underlying Charcot-Saloni-Tooth with peripheral neuropathy).    · Adjuvant Femara (postmenopausal) initiated October 2010 with plan to treat ×10 years.    · Genetic testing reportedly negative.    · Developed osteopenia treated with Prolia beginning 2/27/13. Subsequently discontinued due to identification of metastatic disease.  2. Recurrent/metastatic disease identified 10/8/17:  · Disease involving thoracic spine with cord compression at T6, lumbosacral involvement, sternal and right sternoclavicular involvement.    · Femara discontinued in 10/2017.    · Radiation administered (in the [facility] system) to the thoracic spine beginning 10/19/17 treating T3-T9 to a dose of 24 gray in 6 fractions.  · Evaluation with MRI 12/8/17 showing persistent T6 cord compression with persistent neurologic compromise requiring surgical treatment 12/11/17 with T6 laminectomy/corpectomy and T3-T9 fusion.  Pathology with metastatic carcinoma of breast origin, ER negative, TN negative, HER-2/peter negative (1+ IHC).  · Additional staging evaluation 12/8/17 with no evidence of visceral metastatic disease, bone scan showing involvement of thoracic spine, sternum, left humerus, mid frontal bone.  No plane film correlate of left humerus lesion.  MRI lumbar spine with small intradural L3 metastasis.  CA 15-3 12/6/17- 17.  · Palliative radiation therapy to L3 dural metastasis and left humerus initiated 1/15/18 (10 fractions), completed 1/26/18.  · Hypercalcemia of malignancy with calcium in the 10-11 range.  · Initiation of monthly Xgeva 1/23/18.  · Baseline CT scan 1/30/18 with no evidence of  visceral involvement.  Cluster of nodular opacities in the right lower lobe suspected to be infectious or related to bronchiolitis. Bone scan 1/30/18 showed postsurgical change in the thoracic spine, stable uptake in the frontal bone, no new areas of disease.  · Initiation of palliative oral single agent Xeloda 2/7/18 2000 mg a.m., 1500 mg p.m. for 14/21 days.   · Following 3 cycles xeloda, bone scan 4/4/18 showed no change from the prior study.  CT scan 4/4/18 showed a small pericardial effusion of unclear significance as well as a subcutaneous nodule in the right lateral chest wall.  Subsequent evaluation with echocardiogram 4/17/18 showed no evidence of pericardial effusion.  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  We discussed the possibility of surgical excision of the right subcutaneous chest wall lesion for more definitive diagnosis.  Reviewed previous CT images dating back to 12/8/17 and the lesion was present even at that time measuring around 1.7 cm although not commented on in the radiology report.  As this appears to be an indolent low-grade process unrelated to her breast cancer, recommendee foregoing surgical excision at this time and monitoring this area on future scans.  The patient agreed.    · Following 6 cycles of Xeloda, CT 6/6/18  showed stable findings, no evidence of progressive disease.  There was a comment regarding subcutaneous abnormality in the anterior abdominal wall and this was related to Lovenox injection sites.  Bone scan 6/6/18 showed no interval change.   · CT scan and bone scan 8/13/18 following 9 cycles of Xeloda showed stable findings with no evidence of significant visceral metastases.  Her bone lesions appear stable on bone scan.  The spindle cell neoplasm in her right chest wall actually decreased in size from 2 cm down to 1.6  madeline.    · The patient experienced some symptoms of diarrhea, anorexia, generalized weakness during cycle 9 Xeloda it was unclear whether this was related to a viral gastroenteritis or toxicity from treatment.  Symptoms recurred during cycle 10 and treatment was cut short by 2 days.  Symptoms attributed to Xeloda.  With cycle 11, dose and schedule altered to 1500 mg twice daily for 7 days on followed by 7 days off .  · CT scan 9/9/2020 with no significant changes.  Bone scan 9/9/2020 read as unchanged from prior studies however did note an area of slight activity in the medial left femur.  Contacted radiology and although this was not noted on prior reports appears to have been present.  Subsequent MRI left femur 9/21/2020 with cortical thickening and periosteal edema left iliac us muscle insertion to the medial left femur with no evidence of metastatic disease, favored to represent periosteal change secondary to insertional tendinitis.  · Most recent 3-month interval scans from 5/17/2021 with CT chest abdomen pelvis, bone scan, CT head, CT cervical spine showed no change from previous studies, CT head could not reidentify the right frontal bone metastasis.  CT cervical spine with no evidence of metastatic disease, did show significant degenerative change, spondylosis, neuroforaminal and canal narrowing.  Notation of loosening of pedicle screw at T3 of unclear significance, referred to Dr. Nayak to review.  · Most recent Xgeva given 6/21/2021 and most recent cycle of Xeloda completed 6/22/2021.  · Patient reviewed back today in short-term follow-up due to some worsening of palmar erythema as further discussed below.  Patient also with borderline neutropenia with ANC of 1.6 last week.  As she is reviewed back she has had overall improvement of her skin in relation to Xeloda and counts have recovered.  She will start back on cycle of Xeloda as scheduled this Wednesday, 6/30/2021.  She will otherwise follow-up with   [facility] in 3 weeks as already scheduled.  3. Right pulmonary embolism:  · Diagnosed on CT angiogram 10/21/17 involving small right lower lobe pulmonary artery.  Lower extremity Dopplers negative.  · Bilateral lower extremity Dopplers negative again 12/5/17.  · Received chronic Lovenox 1 mg/kg twice per day, transition to oral Eliquis in February 2019, continuing on Eliquis 5 mg twice daily.  4. Cancer related pain:  · Previously receiving Duragesic 50 µg patch every 72 hours along with Dilaudid 4 mg as needed for breakthrough pain  · The patient's pain improved over time and she was able to discontinue both Duragesic and Dilaudid in the interval.  · Patient does take occasional Flexeril at bedtime due to back spasm/pain when she has been more active.  · The patient does have some occasional aggravation of her chronic back pain and does use tramadol 50 mg every 8 hours as needed  · Patient's pain is stable.  She does continue to use tramadol 50 mg every 12 hours as needed which has been effective.  5. Chemotherapy-induced diarrhea with subsequent C. difficile colitis in the setting of previous ulcerative colitis:  · Patient with reported history of ulcerative colitis, is not on active therapy.  · The patient developed initial diarrhea related to Xeloda at regular dosing.  · Symptoms improved on reduced dose Xeloda  · Flare of symptoms in October 2018 with apparent finding of C. difficile colitis by GI, treated with course of oral vancomycin with improvement in symptoms.  · Patient notes minimal intermittent diarrhea on Xeloda requiring occasional dosing of Imodium.  Recently however she notes that her bowel function has been fairly normal.   6. Traumatic left tibia/fibular fracture:  · Status post ORIF 12/6/17  · Specimen was sent for pathologic review, negative for evidence of malignancy  7. Hypercalcemia:  · Suspect hypercalcemia of malignancy, calcium in  10-11 range previously.  · Calcium normalized following  initiation of monthly Xgeva on 1/23/18.  8. Chemotherapy-induced mucositis:  · Patient had a minimal degree of mucositis with cycle 2.  The patient has magic mouthwash to use as needed.  No subsequent mucositis.  9. Recurrent UTI, bladder wall thickening on CT:  · Patient had an enterococcal UTI on 3/2/18 sensitive to nitrofurantoin and received treatment, unclear how long.  · Recurrent UTI 3/20/18 with urine culture growing Klebsiella, initially treated with nitrofurantoin, transitioned to Levaquin.  · CT 4/4/18 with diffuse bladder wall thickening with increased nodular thickening at the left base.  Referral to urogynecology Dr. May Johnson.  She was placed on a prophylactic dose of trimethoprim 100 mg daily, bladder wall thickening felt to be related to recent recurrent urinary tract infections.  · Patient with urinary symptoms, treated with course of Macrobid at the end of December 2018, urine culture however was negative 12/31/18.  · Patient was found to have Klebsiella UTI 7/29/2019 which was successfully treated with Macrobid with complete resolution of symptoms.  10.   Mobility:  · The patient underwent an intensive course of rehabilitation at [facility].  She graduated from her outpatient course November 2018.  · Overall the patient has improved dramatically in terms of mobility, now walking around 1 mile per day without assistance.  11.  Depression:  · The patient is continuing follow-up in the supportive oncology clinic and is currently continuing on Cymbalta 30 mg twice daily as well as gabapentin 300 mg twice daily.  · The patient feels that her depression symptoms are currently fairly well controlled.  She has seen some benefit from attending support groups at [employer] which she plans to continue.  · The patient does continue follow-up in supportive oncology clinic.  12. Hand-foot syndrome secondary to Xeloda:  · Patient continues with frequent application of emollient cream to the hands and  feet  · Symptoms increased significantly requiring a 1 week delay in cycle 18 Xeloda as noted above.  Symptoms did improve and she continued on the same dose.    · Patient was referred to dermatology and has been continuing on triamcinolone 0.1% cream used for 1 week on followed by 1 week off which led to some further improvement.   · 6/21/2021: Patient reporting stiffening of her hands over the past couple of months.  Patient with slight worsening of palmar erythema and skin peeling.  Patient prescribed urea and requested to come back before initiating further Xeloda.   · 6/28/2021: As patient is reviewed today, skin of the palms is notably softer with use of recently prescribed urea along with CBD salve.  Patient is also using a squeeze ball to help with stiffening and improved mobility of her hands.  With overall improvement she will resume Xeloda as scheduled this Wednesday, 6/30/2021.  She is otherwise scheduled to follow-up with Dr. Hancock in 3 weeks.  Patient understands to call should she have worsening of her symptoms with continuation of Xeloda.    13. Evidence of steatosis on scans with mild intermittent elevated liver function studies:  · Liver function studies increased 8/20/19 with ALT 98, AST 70, normal total bilirubin.  · Negative viral hepatitis A, B, and C panel 8/23/18  · Likely related to hepatic steatosis.   · Subsequent improvement in LFTs  · LFTs in normal ranges 6/21/2021.  14. Chemotherapy induced leukopenia:  · WBC today 10.2 with normal differential  15. GERD:  · The patient continues on omeprazole 40 mg daily (2 x 20 mg).  · The patient also continues to use Carafate 1 g 4 times daily  · Symptoms currently stable  16. Insomnia:  · Patient with prior paradoxical reaction to Benadryl  · Improved previously on temazepam 15 mg nightly as needed.   · Patient noted subsequently that temazepam was having no effect.   · Symptoms currently stable on gabapentin 300 mg in the evening and 300 mg at  night  17. Health maintenance:  · Patient notes that she has a history of colon polyps as well as ulcerative colitis and was due for a follow-up colonoscopy on 9/12/2019.  We did discuss there is no necessity to pursue colonoscopy in the setting of her metastatic breast cancer.    · The patient did undergo colonoscopy on 2/7/2020 with findings of muscular hypertrophy and diverticulosis.   18. Right shoulder pain:  · Patient was evaluated by Dr Forrester.  She has experienced difficulty over a long time frame with right shoulder although she has not complained of this in prior visits to our office.  She reported difficulty with abduction.    · The patient did undergo MRI of her right shoulder on 11/21/2019 at an outside facility showing multiple abnormalities including supraspinatus tendinosis, labral tear but no evidence of metastatic disease.  · Patient developed pain recently in the left shoulder, was seen by orthopedics and underwent steroid injection last week which has led to some improvement.   · Patient with some intermittent right shoulder pain that is fleeting in nature.  We will continue to monitor.  Patient does have follow-up with Dr. Nayak next week.  19. Ocular changes in part related to Xeloda:  · Patient experienced a mild degree of blurred vision as well as burning and pruritus.  · She was seen by her ophthalmologist and has been continuing on xiidra ophthalmic drops which have helped.  · Likely both issues are to some extent related to Xeloda.  · Symptoms currently stable to improved.  20. Elevated glucose:  · It is noted the patient's glucose at the last few visits has been in the high 100 range, postprandial.  · She has had a hemoglobin A1c that has been in the high 5-6 range in the past, on 5/1/2019 at 5.7  · Hemoglobin A1c on 5/7/2021 was improved at 5.2  21. COVID-19 vaccination  · Patient received the Pfizer vaccine, second dose administered on 4/2/2021 without side  effects.     Plan:  1. Continue oral Xeloda 1500 mg twice a day 7 days on followed by 7 days off.  She will begin next week on therapy as of 6/30/2021.     2. Continue urea along with CBD salve to hands for palmar erythema.  3. Continue Eliquis 5 mg twice daily.  4. Continue omeprazole 40 mg daily and Carafate 1 g 4 times daily.    5. Continue xiidra ophthalmic drops prescribed by ophthalmology  6. Continue Provigil 200 mg daily per supportive oncology clinic.  Patient continues routine follow-up with supportive oncology.  7. Upcoming appointment with Dr. Nayak in orthopedics to evaluate findings on recent CT cervical spine that indicate loosening of T3 left pedicle screw.  8. In 3 weeks MD visit with CBC, CMP, magnesium, phosphorus, and Xgeva  9. 6 weeks CT chest abdomen pelvis and bone scan  10. In 7 weeks MD visit with CBC, CMP, magnesium, phosphorus and Xgeva.

## 2021-07-01 ENCOUNTER — OFFICE VISIT (OUTPATIENT)
Dept: PSYCHIATRY | Facility: HOSPITAL | Age: 61
End: 2021-07-01

## 2021-07-01 DIAGNOSIS — G47.33 OSA (OBSTRUCTIVE SLEEP APNEA): ICD-10-CM

## 2021-07-01 DIAGNOSIS — F33.1 MAJOR DEPRESSIVE DISORDER, RECURRENT EPISODE, MODERATE (HCC): Primary | ICD-10-CM

## 2021-07-01 DIAGNOSIS — R53.0 NEOPLASTIC MALIGNANT RELATED FATIGUE: ICD-10-CM

## 2021-07-01 PROCEDURE — 99214 OFFICE O/P EST MOD 30 MIN: CPT | Performed by: NURSE PRACTITIONER

## 2021-07-01 NOTE — PROGRESS NOTES
Supportive Oncology Services  In Person Session    Subjective  Patient ID: Martha Davey is a 60 y.o. female is seen face to face in the [facility]cology [facility] ([facility]) Clinic.    CC: Continued  Demoralization, social distress    HPI:  Pt noted to be alert, oriented, and engaged in conversation. Affect and mood remain dysthymic, chronic.  Pt continues to acknowledge impact of anxiety surrounding threat of recurrence and fears of personal decline. Recognizes challenges managing familial requirement with autistic son and demented , acknowledging fears of continued decline reducing her ability to help those in her [place] who need help.  Sleep remains appropriate on most nights, occasionally difficult to initiate - taking appx 60- 90 minutes.  Generally gets apx 7 hours nightly. Waking by 9 AM.   Pt explores challenges navigating political challenges and differences of opinion in household; recognizes limited time to interact with those who uplift her. Pt continues to see friends, although has not had close connections in recent weeks. Pt additionally reviews connection to [employer] family which is supportive. Pt continues with interest in remaining connected to [employer], although frequently finds ability to participate is disrupted due to needs of others.  Fatigue remains intrusive, reporting discontinuation of modafinil due to feelings of jitteriness at full tab.  Patient is ruminative in conversation, easily redirected.    Review of Systems   Constitutional: Positive for fatigue.   Psychiatric/Behavioral: Positive for dysphoric mood. The patient is nervous/anxious.      Medications Reviewed:  Cymbalta 30 mg bid    Diagnoses and all orders for this visit:    1. Major depressive disorder, recurrent episode, moderate (CMS/HCC) (Primary)    2. MARIA M (obstructive sleep apnea)    3. Neoplastic malignant related fatigue    Plan of Care  Patient continues with general dysthymia, stable on current medication  regimen.  Reviewed impact of modafinil, and encouraged patient to trial half tab daily given previously reported benefit.  Explored opportunities for personal support surrounding the stress of caregiving.  Reviewed opportunities available in the community for assistance managing current demands, as well as counseling provided regarding element of choice, establishment of boundaries and limitations, and recognition of need for self-care.  Provided patient with information to connect with [facility] family and [facility] services for individual and/or group counseling to assist.  Follow-up scheduled in clinic in 8 weeks.    I spent 34 minutes caring for Martha on this date of service. This time includes time spent by me in the following activities: preparing for the visit, obtaining and/or reviewing a separately obtained history, performing a medically appropriate examination and/or evaluation, counseling and educating the patient/family/caregiver, referring and communicating with other health care professionals and documenting information in the medical record.

## 2021-07-16 ENCOUNTER — TELEPHONE (OUTPATIENT)
Dept: ONCOLOGY | Facility: CLINIC | Age: 61
End: 2021-07-16

## 2021-07-16 ENCOUNTER — TELEPHONE (OUTPATIENT)
Dept: PHARMACY | Facility: HOSPITAL | Age: 61
End: 2021-07-16

## 2021-07-16 RX ORDER — TEMAZEPAM 15 MG/1
15 CAPSULE ORAL NIGHTLY PRN
Qty: 90 CAPSULE | Refills: 0 | Status: SHIPPED | OUTPATIENT
Start: 2021-07-16 | End: 2021-10-20 | Stop reason: SDUPTHER

## 2021-07-16 RX ORDER — TRAMADOL HYDROCHLORIDE 50 MG/1
TABLET ORAL
Qty: 90 TABLET | Refills: 0 | Status: SHIPPED | OUTPATIENT
Start: 2021-07-16 | End: 2021-08-25 | Stop reason: SDUPTHER

## 2021-07-16 NOTE — TELEPHONE ENCOUNTER
[facility] Refill Outreach    Called regarding refill of medication: [place]  Spoke with patient.    Assessment  • It looks like it is almost time for your refill, how many doses do you have left? 1 week  • How are you doing on the medication, are you experiencing any side effects? Patient denies side effects   • How many doses have you missed since you last filled your prescription? 0 doses missed.    Shipment   • We can go ahead and coordinate your next refill, would you like to pick this up at the pharmacy/curbside, or have this delivered to you vs. the clinic?   o If government plan (Medicaid and Medicare B), delivery will require signature -> identify a day the patient will be home for delivery set up (note the package should arrive prior to noon as pharmacy will ship priority overnight)  • Patient was advised medication will be shipped via [employer] (priority, signature required) on 7/20/21 with expected delivery on 7/21/21. Shipping comments patient is requesting of [employer] : NONE. .   • Shipping address confirmed: [address] [place] [address]  • Patient is aware and willing to pay copay of $0.   • Do you have any questions for the pharmacist? No.  • Ensured patient has clinic contact information for questions.     Yolanda Meyers, Pharmacy Technician  7/16/2021  12:15 EDT

## 2021-07-19 ENCOUNTER — INFUSION (OUTPATIENT)
Dept: ONCOLOGY | Facility: HOSPITAL | Age: 61
End: 2021-07-19

## 2021-07-19 ENCOUNTER — LAB (OUTPATIENT)
Dept: OTHER | Facility: HOSPITAL | Age: 61
End: 2021-07-19

## 2021-07-19 ENCOUNTER — OFFICE VISIT (OUTPATIENT)
Dept: ONCOLOGY | Facility: CLINIC | Age: 61
End: 2021-07-19

## 2021-07-19 VITALS
DIASTOLIC BLOOD PRESSURE: 75 MMHG | WEIGHT: 187.6 LBS | SYSTOLIC BLOOD PRESSURE: 124 MMHG | TEMPERATURE: 97.3 F | HEART RATE: 92 BPM | RESPIRATION RATE: 17 BRPM | BODY MASS INDEX: 34.52 KG/M2 | OXYGEN SATURATION: 94 % | HEIGHT: 62 IN

## 2021-07-19 DIAGNOSIS — Z17.1 MALIGNANT NEOPLASM OF OVERLAPPING SITES OF RIGHT BREAST IN FEMALE, ESTROGEN RECEPTOR NEGATIVE (HCC): ICD-10-CM

## 2021-07-19 DIAGNOSIS — Z17.1 MALIGNANT NEOPLASM OF OVERLAPPING SITES OF RIGHT BREAST IN FEMALE, ESTROGEN RECEPTOR NEGATIVE (HCC): Primary | ICD-10-CM

## 2021-07-19 DIAGNOSIS — C50.811 MALIGNANT NEOPLASM OF OVERLAPPING SITES OF RIGHT BREAST IN FEMALE, ESTROGEN RECEPTOR NEGATIVE (HCC): Primary | ICD-10-CM

## 2021-07-19 DIAGNOSIS — C50.811 MALIGNANT NEOPLASM OF OVERLAPPING SITES OF RIGHT BREAST IN FEMALE, ESTROGEN RECEPTOR NEGATIVE (HCC): ICD-10-CM

## 2021-07-19 LAB
ALBUMIN SERPL-MCNC: 3.7 G/DL (ref 3.5–5.2)
ALBUMIN/GLOB SERPL: 1.4 G/DL
ALP SERPL-CCNC: 63 U/L (ref 39–117)
ALT SERPL W P-5'-P-CCNC: 15 U/L (ref 1–33)
ANION GAP SERPL CALCULATED.3IONS-SCNC: 8.6 MMOL/L (ref 5–15)
AST SERPL-CCNC: 20 U/L (ref 1–32)
BASOPHILS # BLD AUTO: 0.04 10*3/MM3 (ref 0–0.2)
BASOPHILS NFR BLD AUTO: 1.2 % (ref 0–1.5)
BILIRUB SERPL-MCNC: 0.5 MG/DL (ref 0–1.2)
BUN SERPL-MCNC: 18 MG/DL (ref 8–23)
BUN/CREAT SERPL: 19.6 (ref 7–25)
CALCIUM SPEC-SCNC: 8.9 MG/DL (ref 8.6–10.5)
CHLORIDE SERPL-SCNC: 104 MMOL/L (ref 98–107)
CO2 SERPL-SCNC: 29.4 MMOL/L (ref 22–29)
CREAT SERPL-MCNC: 0.92 MG/DL (ref 0.57–1)
DEPRECATED RDW RBC AUTO: 63.1 FL (ref 37–54)
EOSINOPHIL # BLD AUTO: 0.15 10*3/MM3 (ref 0–0.4)
EOSINOPHIL NFR BLD AUTO: 4.4 % (ref 0.3–6.2)
ERYTHROCYTE [DISTWIDTH] IN BLOOD BY AUTOMATED COUNT: 16.7 % (ref 12.3–15.4)
GFR SERPL CREATININE-BSD FRML MDRD: 62 ML/MIN/1.73
GLOBULIN UR ELPH-MCNC: 2.6 GM/DL
GLUCOSE SERPL-MCNC: 120 MG/DL (ref 65–99)
HCT VFR BLD AUTO: 37.7 % (ref 34–46.6)
HGB BLD-MCNC: 12 G/DL (ref 12–15.9)
IMM GRANULOCYTES # BLD AUTO: 0.01 10*3/MM3 (ref 0–0.05)
IMM GRANULOCYTES NFR BLD AUTO: 0.3 % (ref 0–0.5)
LYMPHOCYTES # BLD AUTO: 0.75 10*3/MM3 (ref 0.7–3.1)
LYMPHOCYTES NFR BLD AUTO: 22.1 % (ref 19.6–45.3)
MAGNESIUM SERPL-MCNC: 2.1 MG/DL (ref 1.6–2.4)
MCH RBC QN AUTO: 32.6 PG (ref 26.6–33)
MCHC RBC AUTO-ENTMCNC: 31.8 G/DL (ref 31.5–35.7)
MCV RBC AUTO: 102.4 FL (ref 79–97)
MONOCYTES # BLD AUTO: 0.29 10*3/MM3 (ref 0.1–0.9)
MONOCYTES NFR BLD AUTO: 8.6 % (ref 5–12)
NEUTROPHILS NFR BLD AUTO: 2.15 10*3/MM3 (ref 1.7–7)
NEUTROPHILS NFR BLD AUTO: 63.4 % (ref 42.7–76)
NRBC BLD AUTO-RTO: 0 /100 WBC (ref 0–0.2)
PHOSPHATE SERPL-MCNC: 2.6 MG/DL (ref 2.5–4.5)
PLATELET # BLD AUTO: 205 10*3/MM3 (ref 140–450)
PMV BLD AUTO: 10.2 FL (ref 6–12)
POTASSIUM SERPL-SCNC: 4.7 MMOL/L (ref 3.5–5.2)
PROT SERPL-MCNC: 6.3 G/DL (ref 6–8.5)
RBC # BLD AUTO: 3.68 10*6/MM3 (ref 3.77–5.28)
SODIUM SERPL-SCNC: 142 MMOL/L (ref 136–145)
WBC # BLD AUTO: 3.39 10*3/MM3 (ref 3.4–10.8)

## 2021-07-19 PROCEDURE — 25010000002 DENOSUMAB 120 MG/1.7ML SOLUTION: Performed by: INTERNAL MEDICINE

## 2021-07-19 PROCEDURE — 80053 COMPREHEN METABOLIC PANEL: CPT | Performed by: INTERNAL MEDICINE

## 2021-07-19 PROCEDURE — 99215 OFFICE O/P EST HI 40 MIN: CPT | Performed by: INTERNAL MEDICINE

## 2021-07-19 PROCEDURE — 36415 COLL VENOUS BLD VENIPUNCTURE: CPT

## 2021-07-19 PROCEDURE — 84100 ASSAY OF PHOSPHORUS: CPT | Performed by: INTERNAL MEDICINE

## 2021-07-19 PROCEDURE — 85025 COMPLETE CBC W/AUTO DIFF WBC: CPT | Performed by: INTERNAL MEDICINE

## 2021-07-19 PROCEDURE — 83735 ASSAY OF MAGNESIUM: CPT | Performed by: INTERNAL MEDICINE

## 2021-07-19 PROCEDURE — 96372 THER/PROPH/DIAG INJ SC/IM: CPT

## 2021-07-19 RX ADMIN — DENOSUMAB 120 MG: 120 INJECTION SUBCUTANEOUS at 10:44

## 2021-07-19 NOTE — PROGRESS NOTES
"Chief Complaint  Previous Stage Ib ([id number]) ER/IA positive, HER-2/peter negative right breast cancer with subsequent metastatic disease identified 10/8/2017, history of right pulmonary embolism, cancer related pain, chemotherapy-induced diarrhea, chemotherapy-induced mucositis, chemotherapy-induced hand-foot syndrome    Subjective        History of Present Illness  The patient returns today in follow-up continuing on oral Xeloda 1500 mg twice daily for 7 days on followed by 7 days off as well as monthly Xgeva and Eliquis 5 mg twice daily.  She has labs to review today.  She is ready to begin a new week of Xeloda tomorrow.  She is due for Xgeva today.  The patient is scheduled to see Dr. Nayak in orthopedics tomorrow in relation to findings on her recent MRI that showed possibly some loosening of her hardware at T3.  She has some mild chronic back pain which is intermittent in nature and unchanged.  She does take tramadol intermittently for this.  She has had considerable difficulty with hand-foot syndrome, mainly involving her hands.  She had developed some further changes in her hands with a sclerodactyly type reaction that is affecting her level of functioning.  We did prescribe urea-based cream to use 3 times daily at the last visit which has helped.  She is still having difficulty however and has been dropping some objects.  She otherwise tolerates the Xeloda very well with no significant other issues.  Her appetite has been normal.  She has no new musculoskeletal pain.  Reflux symptoms are stable and bowels are stable.      Objective   Vital Signs:   /75   Pulse 92   Temp 97.3 °F (36.3 °C) (Temporal)   Resp 17   Ht 157.5 cm (62.01\")   Wt 85.1 kg (187 lb 9.6 oz)   SpO2 94%   BMI 34.30 kg/m²     Physical Exam  Constitutional:       Appearance: She is well-developed.   Eyes:      Conjunctiva/sclera: Conjunctivae normal.   Neck:      Thyroid: No thyromegaly.   Cardiovascular:      Rate and Rhythm: " Normal rate and regular rhythm.      Heart sounds: No murmur heard.   No friction rub. No gallop.    Pulmonary:      Effort: No respiratory distress.      Breath sounds: Normal breath sounds.   Abdominal:      General: Bowel sounds are normal. There is no distension.      Palpations: Abdomen is soft.      Tenderness: There is no abdominal tenderness.   Lymphadenopathy:      Head:      Right side of head: No submandibular adenopathy.      Cervical: No cervical adenopathy.      Upper Body:      Right upper body: No supraclavicular adenopathy.      Left upper body: No supraclavicular adenopathy.   Skin:     General: Skin is warm and dry.      Findings: Rash present.      Comments: Significant bilateral palmar erythema which extends to the dorsal aspect of the hands also.  There is evidence of skin cracking.  The extent of the erythema has caused some degree of sclerodactyly   Neurological:      Mental Status: She is alert and oriented to person, place, and time.      Cranial Nerves: No cranial nerve deficit.      Motor: No abnormal muscle tone.      Deep Tendon Reflexes: Reflexes normal.   Psychiatric:         Behavior: Behavior normal.        Result Review : Reviewed CBC, CMP, magnesium, phosphorus from today.       Assessment and Plan     1. Previous Stage Ib ([id number]) right breast cancer:  · Diagnosed May 2010 with excisional biopsy for invasive ductal carcinoma, 1.3 cm, grade 2, ER 90%, [place] 80%, HER-2/peter negative (1+ IHC).    · Subsequent right mastectomy in July 2010 with no residual breast malignancy, 1/5 sentinel lymph node with micrometastasis (0.25 mm).    · Treated in the [place] system with adjuvant AC ×4 cycles in 2010 (no taxanes administered due to underlying Charcot-Saloni-Tooth with peripheral neuropathy).    · Adjuvant Femara (postmenopausal) initiated October 2010 with plan to treat ×10 years.    · Genetic testing reportedly negative.    · Developed osteopenia treated with Prolia beginning 2/27/13.  Subsequently discontinued due to identification of metastatic disease.  2. Recurrent/metastatic disease identified 10/8/17:  · Disease involving thoracic spine with cord compression at T6, lumbosacral involvement, sternal and right sternoclavicular involvement.    · Femara discontinued in 10/2017.    · Radiation administered (in the [place] system) to the thoracic spine beginning 10/19/17 treating T3-T9 to a dose of 24 gray in 6 fractions.  · Evaluation with MRI 12/8/17 showing persistent T6 cord compression with persistent neurologic compromise requiring surgical treatment 12/11/17 with T6 laminectomy/corpectomy and T3-T9 fusion.  Pathology with metastatic carcinoma of breast origin, ER negative, CT negative, HER-2/peter negative (1+ IHC).  · Additional staging evaluation 12/8/17 with no evidence of visceral metastatic disease, bone scan showing involvement of thoracic spine, sternum, left humerus, mid frontal bone.  No plane film correlate of left humerus lesion.  MRI lumbar spine with small intradural L3 metastasis.  CA 15-3 12/6/17- 17.  · Palliative radiation therapy to L3 dural metastasis and left humerus initiated 1/15/18 (10 fractions), completed 1/26/18.  · Hypercalcemia of malignancy with calcium in the 10-11 range.  · Initiation of monthly Xgeva 1/23/18.  · Baseline CT scan 1/30/18 with no evidence of visceral involvement.  Cluster of nodular opacities in the right lower lobe suspected to be infectious or related to bronchiolitis. Bone scan 1/30/18 showed postsurgical change in the thoracic spine, stable uptake in the frontal bone, no new areas of disease.  · Initiation of palliative oral single agent Xeloda 2/7/18 2000 mg a.m., 1500 mg p.m. for 14/21 days.   · Following 3 cycles xeloda, bone scan 4/4/18 showed no change from the prior study.  CT scan 4/4/18 showed a small pericardial effusion of unclear significance as well as a subcutaneous nodule in the right lateral chest wall.  Subsequent evaluation  with echocardiogram 4/17/18 showed no evidence of pericardial effusion.  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  We discussed the possibility of surgical excision of the right subcutaneous chest wall lesion for more definitive diagnosis.  Reviewed previous CT images dating back to 12/8/17 and the lesion was present even at that time measuring around 1.7 cm although not commented on in the radiology report.  As this appears to be an indolent low-grade process unrelated to her breast cancer, recommendee foregoing surgical excision at this time and monitoring this area on future scans.  The patient agreed.    · Following 6 cycles of Xeloda, CT 6/6/18  showed stable findings, no evidence of progressive disease.  There was a comment regarding subcutaneous abnormality in the anterior abdominal wall and this was related to Lovenox injection sites.  Bone scan 6/6/18 showed no interval change.   · CT scan and bone scan 8/13/18 following 9 cycles of Xeloda showed stable findings with no evidence of significant visceral metastases.  Her bone lesions appear stable on bone scan.  The spindle cell neoplasm in her right chest wall actually decreased in size from 2 cm down to 1.6 cm.    · The patient experienced some symptoms of diarrhea, anorexia, generalized weakness during cycle 9 Xeloda it was unclear whether this was related to a viral gastroenteritis or toxicity from treatment.  Symptoms recurred during cycle 10 and treatment was cut short by 2 days.  Symptoms attributed to Xeloda.  With cycle 11, dose and schedule altered to 1500 mg twice daily for 7 days on followed by 7 days off .  · CT scan 9/9/2020 with no significant changes.  Bone scan 9/9/2020 read as unchanged from prior studies however did note an area of slight activity in the medial left femur.  Contacted radiology  and although this was not noted on prior reports appears to have been present.  Subsequent MRI left femur 9/21/2020 with cortical thickening and periosteal edema left iliac us muscle insertion to the medial left femur with no evidence of metastatic disease, favored to represent periosteal change secondary to insertional tendinitis.  · Most recent 3-month interval scans from 5/17/2021 with CT chest abdomen pelvis, bone scan, CT head, CT cervical spine showed no change from previous studies, CT head could not reidentify the right frontal bone metastasis.  CT cervical spine with no evidence of metastatic disease, did show significant degenerative change, spondylosis, neuroforaminal and canal narrowing.  Notation of loosening of pedicle screw at T3 of unclear significance, referred to Dr. Nayak to review.  · The patient returns today continuing on treatment with Xeloda 1500 mg twice daily 7 days on followed by 7 days off.  She is due to start a new week of Xeloda tomorrow.  She is due today for monthly Xgeva.  She continues to tolerate treatment relatively well.  She does have ongoing fatigue from treatment however this is improved after beginning Provigil 200 mg daily through the supportive oncology clinic in early May.  She is continuing with severe hand-foot symptoms, mainly affecting her hands.  Over time her symptoms have progressed and in recent months she has developed a progressive sclerodactyly picture which is affecting her dexterity.  At the last visit we did prescribe a urea-based cream which she has been using 3 times daily with some improvement.  Overall, her hands appear to be severely affected.  We discussed the need to alter her regimen at this point.  I did ask her to take an additional week delay before resuming Xeloda and when she does resume treatment next week we will reduce her morning dose to 1000 mg and maintain her evening dose at 1500 mg, again for 7 days on followed by 7 days off.  She is  already scheduled for CT scan and bone scan in 3 weeks and I will see her in 4 weeks for follow-up.  She is to contact us in the interval with any significant issues.  3. Right pulmonary embolism:  · Diagnosed on CT angiogram 10/21/17 involving small right lower lobe pulmonary artery.  Lower extremity Dopplers negative.  · Bilateral lower extremity Dopplers negative again 12/5/17.  · Received chronic Lovenox 1 mg/kg twice per day, transition to oral Eliquis in February 2019, continuing on Eliquis 5 mg twice daily.  4. Cancer related pain:  · Previously receiving Duragesic 50 µg patch every 72 hours along with Dilaudid 4 mg as needed for breakthrough pain  · The patient's pain improved over time and she was able to discontinue both Duragesic and Dilaudid in the interval.  · Patient does take occasional Flexeril at bedtime due to back spasm/pain when she has been more active.  · The patient does have some occasional aggravation of her chronic back pain and does use tramadol 50 mg every 8 hours as needed  · Patient's pain is stable.  She does continue to use tramadol 50 mg every 12 hours as needed which has been effective.  5. Chemotherapy-induced diarrhea with subsequent C. difficile colitis in the setting of previous ulcerative colitis:  · Patient with reported history of ulcerative colitis, is not on active therapy.  · The patient developed initial diarrhea related to Xeloda at regular dosing.  · Symptoms improved on reduced dose Xeloda  · Flare of symptoms in October 2018 with apparent finding of C. difficile colitis by GI, treated with course of oral vancomycin with improvement in symptoms.  · Patient notes minimal intermittent diarrhea on Xeloda requiring occasional dosing of Imodium.  She reports that her bowel function recently has been fairly stable.  6. Traumatic left tibia/fibular fracture:  · Status post ORIF 12/6/17  · Specimen was sent for pathologic review, negative for evidence of  malignancy  7. Hypercalcemia:  · Suspect hypercalcemia of malignancy, calcium in  10-11 range previously.  · Calcium normalized following initiation of monthly Xgeva on 1/23/18.  8. Chemotherapy-induced mucositis:  · Patient had a minimal degree of mucositis with cycle 2.  The patient has magic mouthwash to use as needed.  No subsequent mucositis.  9. Recurrent UTI, bladder wall thickening on CT:  · Patient had an enterococcal UTI on 3/2/18 sensitive to nitrofurantoin and received treatment, unclear how long.  · Recurrent UTI 3/20/18 with urine culture growing Klebsiella, initially treated with nitrofurantoin, transitioned to Levaquin.  · CT 4/4/18 with diffuse bladder wall thickening with increased nodular thickening at the left base.  Referral to urogynecology Dr. May Johnson.  She was placed on a prophylactic dose of trimethoprim 100 mg daily, bladder wall thickening felt to be related to recent recurrent urinary tract infections.  · Patient with urinary symptoms, treated with course of Macrobid at the end of December 2018, urine culture however was negative 12/31/18.  · Patient was found to have Klebsiella UTI 7/29/2019 which was successfully treated with Macrobid with complete resolution of symptoms.  10.   Mobility:  · The patient underwent an intensive course of rehabilitation at [facility].  She graduated from her outpatient course November 2018.  · Overall the patient has improved dramatically in terms of mobility, now walking around 1 mile per day without assistance.  11.  Depression:  · The patient is continuing follow-up in the supportive oncology clinic and is currently continuing on Cymbalta 30 mg twice daily as well as gabapentin 300 mg twice daily.  · The patient feels that her depression symptoms are currently fairly well controlled.  She has seen some benefit from attending support groups at [facility] which she plans to continue.  · The patient does continue follow-up in supportive oncology  clinic.  12. 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  Symptoms did improve and she continued on the same dose.    · Patient was referred to dermatology and has been continuing on triamcinolone 0.1% cream used for 1 week on followed by 1 week off which led to some further improvement.   · Progressive palmar erythema with development of sclerodactyly and effect on dexterity.  Addition of urea-based cream 3 times daily.  · Patient with continued difficulty regarding erythema of her hands that has extended onto the dorsal aspect and cause now some contractures/sclerodactyly in her fingers affecting her dexterity.  We discussed today as noted above the need to alter her Xeloda regimen at this point.  She will hold Xeloda for an additional week (was due to start today).  When she does resume next week she will resume with reduction in her morning dose from 1500 mg down to 1000 mg and maintain her evening dose at 1500 mg and continue on a 7-day on followed by 7-day off schedule.  13. Evidence of steatosis on scans with mild intermittent elevated liver function studies:  · Liver function studies increased 8/20/19 with ALT 98, AST 70, normal total bilirubin.  · Negative viral hepatitis A, B, and C panel 8/23/18  · Likely related to hepatic steatosis.   · Subsequent improvement in LFTs  · Today, LFTs normal  14. Chemotherapy induced leukopenia:  · WBC today  3.39 with normal differential  15. GERD:  · The patient continues on omeprazole 40 mg daily (2 x 20 mg).  · The patient also continues to use Carafate 1 g 4 times daily  · Symptoms currently stable  16. Insomnia:  · Patient with prior paradoxical reaction to Benadryl  · Improved previously on temazepam 15 mg nightly as needed.   · Patient noted subsequently that temazepam was having no  effect.   · Symptoms currently stable on gabapentin 300 mg in the evening and 300 mg at night  17. Health maintenance:  · Patient notes that she has a history of colon polyps as well as ulcerative colitis and was due for a follow-up colonoscopy on 9/12/2019.  We did discuss there is no necessity to pursue colonoscopy in the setting of her metastatic breast cancer.    · The patient did undergo colonoscopy on 2/7/2020 with findings of muscular hypertrophy and diverticulosis.   18. Right shoulder pain:  · Patient was evaluated by Dr Forrester.  She has experienced difficulty over a long time frame with right shoulder although she has not complained of this in prior visits to our office.  She reported difficulty with abduction.    · The patient did undergo MRI of her right shoulder on 11/21/2019 at an outside facility showing multiple abnormalities including supraspinatus tendinosis, labral tear but no evidence of metastatic disease.  · Patient developed pain recently in the left shoulder, was seen by orthopedics and underwent steroid injection last week which has led to some improvement.  Right shoulder is stable currently  19. Ocular changes in part related to Xeloda:  · Patient experienced a mild degree of blurred vision as well as burning and pruritus.  · She was seen by her ophthalmologist and has been continuing on xiidra ophthalmic drops which have helped.  · Likely both issues are to some extent related to Xeloda.  · Symptoms currently stable to improved.  20. Elevated glucose:  · It is noted the patient's glucose at the last few visits has been in the high 100 range, postprandial.  · She has had a hemoglobin A1c that has been in the high 5-6 range in the past, on 5/1/2019 at 5.7  · Hemoglobin A1c on 5/7/2021 was improved at 5.2  21. Possible loosening of pedicle screw at T3:  · CT cervical spine 5/17/2021 showed loosening of the left pedicle screw at T3.  Patient referred to orthopedics and is scheduled to be  seen by Dr. Nayak tomorrow to evaluate.  22. COVID-19 vaccination  · Patient received the Pfizer vaccine, second dose administered on 4/2/2021 without side effects.     Plan:  1. We will hold the next week of Xeloda by 1 more week (was due to start today) and when the patient resumes next week she will resume at a reduced dose of 1000 mg in the morning and 1500 mg in the evening, again for 7 days on followed by 7 days off.  2. Monthly Xgeva today  3. Continue Eliquis 5 mg twice daily.  4. Continue use of emollient cream and urea-based cream on the hands and feet and continue to wear socks and gloves at night as well as use of triamcinolone cream as prescribed by dermatology.  5. Continue omeprazole 40 mg daily and Carafate 1 g 4 times daily.    6. Continue xiidra ophthalmic drops prescribed by ophthalmology  7. Continue Provigil 200 mg daily per supportive oncology clinic.  Patient continues routine follow-up with supportive oncology.  8. The patient will be seeing Dr. Nayak tomorrow in orthopedics to evaluate findings on recent CT cervical spine that indicate loosening of T3 left pedicle screw.  9. In 3 weeks CT chest abdomen pelvis and bone scan  10. In for weeks MD visit with CBC, CMP, magnesium, phosphorus and Xgeva.     Patient continuing on high risk medication requiring intensive monitoring.  Patient with severe side effects of treatment requiring treatment to be held and dose to be modified.

## 2021-07-20 ENCOUNTER — OFFICE VISIT (OUTPATIENT)
Dept: ORTHOPEDIC SURGERY | Facility: CLINIC | Age: 61
End: 2021-07-20

## 2021-07-20 VITALS — BODY MASS INDEX: 34.41 KG/M2 | TEMPERATURE: 96 F | WEIGHT: 187 LBS | HEIGHT: 62 IN

## 2021-07-20 DIAGNOSIS — M54.2 CERVICAL SPINE PAIN: ICD-10-CM

## 2021-07-20 DIAGNOSIS — M54.2 NECK PAIN: ICD-10-CM

## 2021-07-20 DIAGNOSIS — M54.6 THORACIC SPINE PAIN: Primary | ICD-10-CM

## 2021-07-20 PROCEDURE — 72070 X-RAY EXAM THORAC SPINE 2VWS: CPT | Performed by: ORTHOPAEDIC SURGERY

## 2021-07-20 PROCEDURE — 72040 X-RAY EXAM NECK SPINE 2-3 VW: CPT | Performed by: ORTHOPAEDIC SURGERY

## 2021-07-20 PROCEDURE — 99213 OFFICE O/P EST LOW 20 MIN: CPT | Performed by: ORTHOPAEDIC SURGERY

## 2021-07-20 NOTE — PROGRESS NOTES
She is doing very well overall.  I have not seen her in 3 years now.  She is walking no bowel or bladder complaints still with some weakness in dorsiflexion of the feet.  She has occasional neck and low back pain recent CT scan had some concerns about her thoracic hardware and Dr. Hancock asked her to see me.  She has no radiating leg pain or new imbalance some chronic challenge from the healed spinal injury which occurred after her pathologic fracture.  The back is nontender good strength in the legs.  CT scan of the chest did show no new obvious pathologic fractures old fractures were healed there was some lucencies around the T3 screw and in the caudal screw is close to if not into the caudal endplate but the whole construct looks fine overall posture is good no significant surrounding adjacent degenerative change.  She does have spondylosis in the neck and low back but nothing at this point that needs further evaluation or treatment.  I will see her as needed

## 2021-07-29 DIAGNOSIS — G47.33 OSA (OBSTRUCTIVE SLEEP APNEA): ICD-10-CM

## 2021-07-29 DIAGNOSIS — F33.1 MAJOR DEPRESSIVE DISORDER, RECURRENT EPISODE, MODERATE (HCC): ICD-10-CM

## 2021-07-29 DIAGNOSIS — R53.0 NEOPLASTIC MALIGNANT RELATED FATIGUE: ICD-10-CM

## 2021-07-30 ENCOUNTER — MEDICATION THERAPY MANAGEMENT (OUTPATIENT)
Dept: PHARMACY | Facility: HOSPITAL | Age: 61
End: 2021-07-30

## 2021-07-30 NOTE — PROGRESS NOTES
[facility] telephone encounter re adherence and side effects ( capecitabine)    Ms Johnson called the [facility] office today ( returning prior call).  She reports being on a current break from Capecitabine due to hand foot syndrome.  She reports that her hands have improved in color and are less painful during this break.  She has a current supply at home, and reports she restarts on 8/4/21 to best of her recollection. She verbalized her new starting dose next week as Capecitabine 1000 mg po q am/ 1500 mg po q am for 7 days on , then 7 days off.   She denies nausea, diarrhea or mouth sores.  She is eating well and remains active, and has not had any recent changes in her medications.  She had no questions or concerns for the [facility] office today.

## 2021-08-02 RX ORDER — MODAFINIL 200 MG/1
TABLET ORAL
Qty: 30 TABLET | Refills: 0 | Status: SHIPPED | OUTPATIENT
Start: 2021-08-02 | End: 2021-09-28

## 2021-08-04 ENCOUNTER — TELEPHONE (OUTPATIENT)
Dept: ONCOLOGY | Facility: CLINIC | Age: 61
End: 2021-08-04

## 2021-08-04 NOTE — TELEPHONE ENCOUNTER
Caller: Martha Davey    Relationship to patient: Self    Best call back number: [phone]    Chief complaint: PT NEEDS TO RESCHEDULE    Type of visit: FOLLOW UP    Requested date:     If rescheduling, when is the original appointment: 08/16    Additional notes:

## 2021-08-10 ENCOUNTER — HOSPITAL ENCOUNTER (OUTPATIENT)
Dept: CT IMAGING | Facility: HOSPITAL | Age: 61
Discharge: HOME | End: 2021-08-10
Admitting: INTERNAL MEDICINE

## 2021-08-10 ENCOUNTER — HOSPITAL ENCOUNTER (OUTPATIENT)
Dept: NUCLEAR MEDICINE | Facility: HOSPITAL | Age: 61
Discharge: HOME | End: 2021-08-10

## 2021-08-10 DIAGNOSIS — Z17.1 MALIGNANT NEOPLASM OF OVERLAPPING SITES OF RIGHT BREAST IN FEMALE, ESTROGEN RECEPTOR NEGATIVE (HCC): ICD-10-CM

## 2021-08-10 DIAGNOSIS — C50.811 MALIGNANT NEOPLASM OF OVERLAPPING SITES OF RIGHT BREAST IN FEMALE, ESTROGEN RECEPTOR NEGATIVE (HCC): ICD-10-CM

## 2021-08-10 LAB — CREAT BLDA-MCNC: 0.9 MG/DL (ref 0.6–1.3)

## 2021-08-10 PROCEDURE — 78306 BONE IMAGING WHOLE BODY: CPT

## 2021-08-10 PROCEDURE — A9503 TC99M MEDRONATE: HCPCS | Performed by: INTERNAL MEDICINE

## 2021-08-10 PROCEDURE — 82565 ASSAY OF CREATININE: CPT

## 2021-08-10 PROCEDURE — 71260 CT THORAX DX C+: CPT

## 2021-08-10 PROCEDURE — 74177 CT ABD & PELVIS W/CONTRAST: CPT

## 2021-08-10 PROCEDURE — 25010000002 IOPAMIDOL 61 % SOLUTION: Performed by: INTERNAL MEDICINE

## 2021-08-10 PROCEDURE — 0 TECHNETIUM MEDRONATE KIT: Performed by: INTERNAL MEDICINE

## 2021-08-10 RX ORDER — TC 99M MEDRONATE 20 MG/10ML
20.7 INJECTION, POWDER, LYOPHILIZED, FOR SOLUTION INTRAVENOUS
Status: COMPLETED | OUTPATIENT
Start: 2021-08-10 | End: 2021-08-10

## 2021-08-10 RX ADMIN — Medication 20.7 MILLICURIE: at 08:57

## 2021-08-10 RX ADMIN — IOPAMIDOL 85 ML: 612 INJECTION, SOLUTION INTRAVENOUS at 10:07

## 2021-08-16 ENCOUNTER — TELEPHONE (OUTPATIENT)
Dept: PHARMACY | Facility: HOSPITAL | Age: 61
End: 2021-08-16

## 2021-08-20 NOTE — PROGRESS NOTES
Chief Complaint  Previous Stage Ib ([id number]) ER/WA positive, HER-2/peter negative right breast cancer with subsequent metastatic disease identified 10/8/2017, history of right pulmonary embolism, cancer related pain, chemotherapy-induced diarrhea, chemotherapy-induced mucositis, chemotherapy-induced hand-foot syndrome    Subjective        History of Present Illness  The patient returns today in follow-up continuing on oral Xeloda 1000 mg in the morning, 1500 mg in the evening for 7 days on followed by 7 days off as well as monthly Xgeva and Eliquis 5 mg twice daily.  She has labs as well as CT scan and bone scan to review today.  The patient was developing severe hand-foot symptoms which were causing sclerodactyly and restrictions in her finger movement.  At the last visit we did hold Xeloda for an additional week and reduced her dose.  She reports that today her symptoms are mildly improved.  Her finger mobility has improved and the degree of erythema in her palms is less.  There is slightly more skin cracking and peeling however.  She feels that her symptoms are tolerable and with the improvement she would like to continue on the current dose and schedule.  She did see Dr. Nayak in orthopedics to review the findings in her cervical spine indicating some potential loosening of her hardware however he felt that there were no concerning findings.  Patient does note last week that she had a mild cough and congestion which has now improved.  She has had some ongoing intermittent low back pain and is starting physical therapy fairly soon.  She does note onset of dysuria recently, tried going back on Pyridium but ran out of medication.  She was previously on a suppressive dose of trimethoprim however notes that she stopped this quite some time back for unclear reasons and has not followed up with urogynecology.  She notes that her bowels have been fairly stable, occasional loose stool but no overt diarrhea.  She has  mild fatigue but remains very active, ECOG performance status of 1.      Objective   Vital Signs:   /83   Pulse 86   Temp 97.8 °F (36.6 °C)   Wt 85 kg (187 lb 4.8 oz)   SpO2 97%   BMI 34.82 kg/m²     Physical Exam  Constitutional:       Appearance: She is well-developed.   Eyes:      Conjunctiva/sclera: Conjunctivae normal.   Neck:      Thyroid: No thyromegaly.   Cardiovascular:      Rate and Rhythm: Normal rate and regular rhythm.      Heart sounds: No murmur heard.   No friction rub. No gallop.    Pulmonary:      Effort: No respiratory distress.      Breath sounds: Normal breath sounds.   Abdominal:      General: Bowel sounds are normal. There is no distension.      Palpations: Abdomen is soft.      Tenderness: There is no abdominal tenderness.   Lymphadenopathy:      Head:      Right side of head: No submandibular adenopathy.      Cervical: No cervical adenopathy.      Upper Body:      Right upper body: No supraclavicular adenopathy.      Left upper body: No supraclavicular adenopathy.   Skin:     General: Skin is warm and dry.      Findings: Rash present.      Comments: The degree of palmar erythema extending onto the dorsal aspect of the hands has decreased.  There has been an slight increase in the degree of skin cracking.  Sclerodactyly remains however finger movement has improved.   Neurological:      Mental Status: She is alert and oriented to person, place, and time.      Cranial Nerves: No cranial nerve deficit.      Motor: No abnormal muscle tone.      Deep Tendon Reflexes: Reflexes normal.   Psychiatric:         Behavior: Behavior normal.        Result Review : Reviewed CBC, CMP, magnesium, phosphorus from today.  Reviewed bone scan and CT chest abdomen pelvis from 8/10/2021.  I did personally review CT images with interpretation as outlined in the assessment below.       Assessment and Plan     1. Previous Stage Ib ([id number]) right breast cancer:  · Diagnosed May 2010 with excisional  biopsy for invasive ductal carcinoma, 1.3 cm, grade 2, ER 90%, DC 80%, HER-2/peter negative (1+ IHC).    · Subsequent right mastectomy in July 2010 with no residual breast malignancy, 1/5 sentinel lymph node with micrometastasis (0.25 mm).    · Treated in the [facility] system with adjuvant AC ×4 cycles in 2010 (no taxanes administered due to underlying Charcot-Saloni-Tooth with peripheral neuropathy).    · Adjuvant Femara (postmenopausal) initiated October 2010 with plan to treat ×10 years.    · Genetic testing reportedly negative.    · Developed osteopenia treated with Prolia beginning 2/27/13. Subsequently discontinued due to identification of metastatic disease.  2. Recurrent/metastatic disease identified 10/8/17:  · Disease involving thoracic spine with cord compression at T6, lumbosacral involvement, sternal and right sternoclavicular involvement.    · Femara discontinued in 10/2017.    · Radiation administered (in the [facility] system) to the thoracic spine beginning 10/19/17 treating T3-T9 to a dose of 24 gray in 6 fractions.  · Evaluation with MRI 12/8/17 showing persistent T6 cord compression with persistent neurologic compromise requiring surgical treatment 12/11/17 with T6 laminectomy/corpectomy and T3-T9 fusion.  Pathology with metastatic carcinoma of breast origin, ER negative, DC negative, HER-2/peter negative (1+ IHC).  · Additional staging evaluation 12/8/17 with no evidence of visceral metastatic disease, bone scan showing involvement of thoracic spine, sternum, left humerus, mid frontal bone.  No plane film correlate of left humerus lesion.  MRI lumbar spine with small intradural L3 metastasis.  CA 15-3 12/6/17- 17.  · Palliative radiation therapy to L3 dural metastasis and left humerus initiated 1/15/18 (10 fractions), completed 1/26/18.  · Hypercalcemia of malignancy with calcium in the 10-11 range.  · Initiation of monthly Xgeva 1/23/18.  · Baseline CT scan 1/30/18 with no evidence of visceral involvement.   Cluster of nodular opacities in the right lower lobe suspected to be infectious or related to bronchiolitis. Bone scan 1/30/18 showed postsurgical change in the thoracic spine, stable uptake in the frontal bone, no new areas of disease.  · Initiation of palliative oral single agent Xeloda 2/7/18 2000 mg a.m., 1500 mg p.m. for 14/21 days.   · Following 3 cycles xeloda, bone scan 4/4/18 showed no change from the prior study.  CT scan 4/4/18 showed a small pericardial effusion of unclear significance as well as a subcutaneous nodule in the right lateral chest wall.  Subsequent evaluation with echocardiogram 4/17/18 showed no evidence of pericardial effusion.  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  We discussed the possibility of surgical excision of the right subcutaneous chest wall lesion for more definitive diagnosis.  Reviewed previous CT images dating back to 12/8/17 and the lesion was present even at that time measuring around 1.7 cm although not commented on in the radiology report.  As this appears to be an indolent low-grade process unrelated to her breast cancer, recommendee foregoing surgical excision at this time and monitoring this area on future scans.  The patient agreed.    · Following 6 cycles of Xeloda, CT 6/6/18  showed stable findings, no evidence of progressive disease.  There was a comment regarding subcutaneous abnormality in the anterior abdominal wall and this was related to Lovenox injection sites.  Bone scan 6/6/18 showed no interval change.   · CT scan and bone scan 8/13/18 following 9 cycles of Xeloda showed stable findings with no evidence of significant visceral metastases.  Her bone lesions appear stable on bone scan.  The spindle cell neoplasm in her right chest wall actually decreased in size from 2 cm down to 1.6 cm.    · The patient  experienced some symptoms of diarrhea, anorexia, generalized weakness during cycle 9 Xeloda it was unclear whether this was related to a viral gastroenteritis or toxicity from treatment.  Symptoms recurred during cycle 10 and treatment was cut short by 2 days.  Symptoms attributed to Xeloda.  With cycle 11, dose and schedule altered to 1500 mg twice daily for 7 days on followed by 7 days off .  · CT scan 9/9/2020 with no significant changes.  Bone scan 9/9/2020 read as unchanged from prior studies however did note an area of slight activity in the medial left femur.  Contacted radiology and although this was not noted on prior reports appears to have been present.  Subsequent MRI left femur 9/21/2020 with cortical thickening and periosteal edema left iliac us muscle insertion to the medial left femur with no evidence of metastatic disease, favored to represent periosteal change secondary to insertional tendinitis.  · Severe hand-foot symptoms causing sclerodactyly and limitation in finger movement prompted change in dosing in July 2021 with Xeloda decreased to 1000 mg a.m., 1500 mg p.m. for 7 days on followed by 7 days off.  · Most recent 3-month interval scans from 8/10/2021 with CT chest abdomen pelvis, bone scan performed.  CT scans showed no change in pulmonary nodules and sclerotic bone metastases.  There was new area of tree-in-bud nodularity right lower lobe felt to be infectious/inflammatory.  The right lateral chest wall lesion decreased from 2.09 1.6 cm.  Diffuse bladder wall thickening noted with concern for cystitis.  Bone scan showed stable disease.  · The patient returns today continuing on treatment with Xeloda 1000 mg a.m., 1500 mg p.m. 7 days on followed by 7 days off.  She is due to start a new week of Xeloda today.  She is due today for monthly Xgeva.  We did hold Xeloda for an additional week and reduced her dose last month due to severe hand-foot symptoms causing sclerodactyly and limitations in  finger movement.  Her symptoms have improved somewhat with decrease in the degree of erythema involving the palms of the hands and increase in movement of her fingers.  She still has significant thickening of the skin in her hands and dry, cracked skin.  She will continue to use emollient creams.  We did discuss potential dose delay again however the patient would prefer to continue on treatment, feels that her symptoms are overall improved.  We did review her scans from 8/10/2021 showing stable findings in terms of her malignancy.  There was comment regarding a new area of tree-in-bud nodularity right lower lobe that appears infectious/inflammatory.  Patient did have an increase in cough recently which has now resolved, unclear whether she had a mild respiratory illness at the time.  There does not appear to be any need to pursue antibiotic therapy and I do not feel that the changes are related to her malignancy.  She is having some back pain however that does not appear to be related to progressive disease from her breast cancer.  Unclear whether this could be related to cystitis and urinary issues as well (see below).  We will continue on with treatment as planned.  She will receive Xgeva today.  We will plan for a 3-month interval follow-up CT scan and bone scan again.  In 4 weeks she will return for nurse practitioner visit when she is due for Xgeva.  I will see her in 8 weeks when she is again due for Xgeva and again in 12 weeks with Xgeva and just prior to that visit she will have her scans performed.  3. Right pulmonary embolism:  · Diagnosed on CT angiogram 10/21/17 involving small right lower lobe pulmonary artery.  Lower extremity Dopplers negative.  · Bilateral lower extremity Dopplers negative again 12/5/17.  · Received chronic Lovenox 1 mg/kg twice per day, transition to oral Eliquis in February 2019, continuing on Eliquis 5 mg twice daily.  4. Cancer related pain:  · Previously receiving Duragesic 50  µg patch every 72 hours along with Dilaudid 4 mg as needed for breakthrough pain  · The patient's pain improved over time and she was able to discontinue both Duragesic and Dilaudid in the interval.  · Patient does take occasional Flexeril at bedtime due to back spasm/pain when she has been more active.  · The patient does have some occasional aggravation of her chronic back pain and does use tramadol 50 mg every 8 hours as needed  · Patient's pain is stable.  She does continue to use tramadol 50 mg every 12 hours as needed which has been effective.  She has experienced an increase in her chronic back pain recently and is going to be starting in on a course of physical therapy soon.  5. Chemotherapy-induced diarrhea with subsequent C. difficile colitis in the setting of previous ulcerative colitis:  · Patient with reported history of ulcerative colitis, is not on active therapy.  · The patient developed initial diarrhea related to Xeloda at regular dosing.  · Symptoms improved on reduced dose Xeloda  · Flare of symptoms in October 2018 with apparent finding of C. difficile colitis by GI, treated with course of oral vancomycin with improvement in symptoms.  · Patient notes minimal intermittent diarrhea/loose stools on Xeloda requiring occasional dosing of Imodium.  She reports that her bowel function recently has been fairly stable.  6. Traumatic left tibia/fibular fracture:  · Status post ORIF 12/6/17  · Specimen was sent for pathologic review, negative for evidence of malignancy  7. Hypercalcemia:  · Suspect hypercalcemia of malignancy, calcium in  10-11 range previously.  · Calcium normalized following initiation of monthly Xgeva on 1/23/18.  8. Chemotherapy-induced mucositis:  · Patient had a minimal degree of mucositis with cycle 2.  The patient has magic mouthwash to use as needed.  No subsequent mucositis.  9. Recurrent UTI, bladder wall thickening on CT:  · Patient had an enterococcal UTI on 3/2/18 sensitive  to nitrofurantoin and received treatment, unclear how long.  · Recurrent UTI 3/20/18 with urine culture growing Klebsiella, initially treated with nitrofurantoin, transitioned to Levaquin.  · CT 4/4/18 with diffuse bladder wall thickening with increased nodular thickening at the left base.  Referral to urogynecology Dr. May Johnson.  She was placed on a prophylactic dose of trimethoprim 100 mg daily, bladder wall thickening felt to be related to recent recurrent urinary tract infections.  · Patient with urinary symptoms, treated with course of Macrobid at the end of December 2018, urine culture however was negative 12/31/18.  · Patient was found to have Klebsiella UTI 7/29/2019 which was successfully treated with Macrobid with complete resolution of symptoms.  · Current CT 8/10/2021 with diffuse bladder wall thickening new from 5/17/2021 (however seen on multiple prior scans).  Patient with an increase in dysuria recently.  We will obtain urinalysis and urine culture today and pursue treatment if culture positive.  We did provide a refill for Pyridium 200 mg 3 times daily as needed.  I suggested that we refer the patient back to urogynecology Dr. Cheryl Johnson for evaluation as well.  10.   Mobility:  · The patient underwent an intensive course of rehabilitation at [facility].  She graduated from her outpatient course November 2018.  · Overall the patient has improved dramatically in terms of mobility, now walking around 1 mile per day without assistance.  11.  Depression:  · The patient is continuing follow-up in the supportive oncology clinic and is currently continuing on Cymbalta 30 mg twice daily as well as gabapentin 300 mg twice daily.  · The patient feels that her depression symptoms are currently fairly well controlled.  She has seen some benefit from attending support groups at [facility] which she plans to continue.  · The patient does continue follow-up in supportive oncology clinic.  12. Hand-foot syndrome  secondary to Xeloda:  · Patient continues with frequent application of emollient cream to the hands and feet  · Symptoms increased significantly requiring a 1 week delay in cycle 18 Xeloda as noted above.  Symptoms did improve and she continued on the same dose.    · Patient was referred to dermatology and has been continuing on triamcinolone 0.1% cream used for 1 week on followed by 1 week off which led to some further improvement.   · Progressive palmar erythema with development of sclerodactyly and effect on dexterity.  Addition of urea-based cream 3 times daily.  · Patient with continued difficulty regarding erythema of her hands that extended onto the dorsal aspect and was causing contractures/sclerodactyly in her fingers affecting her dexterity.  In July 2021, held Xeloda for an additional week and then reduced dose from 1500 mg twice daily down to 1000 mg a.m. and 1500 mg p.m. and continued on a 7-day on followed by 7-day off schedule.  · Symptoms have improved slightly today with a decrease in erythema and increase in mobility of her fingers.  She continues with dry cracked skin involving the palms and skin thickening.  Patient feels that with improvement in her symptoms she would like to continue on the current dose and schedule and does not wish to hold Xeloda currently.  We did discuss that if her symptoms worsen again she needs to contact us and we would consider holding treatment and further dose reduction.  13. Evidence of steatosis on scans with mild intermittent elevated liver function studies:  · Liver function studies increased 8/20/19 with ALT 98, AST 70, normal total bilirubin.  · Negative viral hepatitis A, B, and C panel 8/23/18  · Likely related to hepatic steatosis.   · Subsequent improvement in LFTs  · Today, LFTs normal  14. Chemotherapy induced leukopenia:  · WBC today  6.26 with normal differential  15. GERD:  · The patient continues on omeprazole 40 mg daily (2 x 20 mg).  · The patient  also continues to use Carafate 1 g 4 times daily  · Symptoms currently stable  16. Insomnia:  · Patient with prior paradoxical reaction to Benadryl  · Improved previously on temazepam 15 mg nightly as needed.   · Patient noted subsequently that temazepam was having no effect.   · Symptoms currently stable on gabapentin 300 mg in the evening and 300 mg at night  17. Health maintenance:  · Patient notes that she has a history of colon polyps as well as ulcerative colitis and was due for a follow-up colonoscopy on 9/12/2019.  We did discuss there is no necessity to pursue colonoscopy in the setting of her metastatic breast cancer.    · The patient did undergo colonoscopy on 2/7/2020 with findings of muscular hypertrophy and diverticulosis.   18. Right shoulder pain:  · Patient was evaluated by Dr Forrester.  She has experienced difficulty over a long time frame with right shoulder although she has not complained of this in prior visits to our office.  She reported difficulty with abduction.    · The patient did undergo MRI of her right shoulder on 11/21/2019 at an outside facility showing multiple abnormalities including supraspinatus tendinosis, labral tear but no evidence of metastatic disease.  · Patient developed pain recently in the left shoulder, was seen by orthopedics and underwent steroid injection last week which has led to some improvement.  Right shoulder is stable currently  19. Ocular changes in part related to Xeloda:  · Patient experienced a mild degree of blurred vision as well as burning and pruritus.  · She was seen by her ophthalmologist and has been continuing on xiidra ophthalmic drops which have helped.  · Likely both issues are to some extent related to Xeloda.  · Symptoms currently stable to improved.  20. Elevated glucose:  · It is noted the patient's glucose at the last few visits has been in the high 100 range, postprandial.  · She has had a hemoglobin A1c that has been in the high 5-6 range  in the past, on 5/1/2019 at 5.7  · Hemoglobin A1c on 5/7/2021 was improved at 5.2  21. Possible loosening of pedicle screw at T3:  · CT cervical spine 5/17/2021 showed loosening of the left pedicle screw at T3.  Patient referred to orthopedics and was seen by Dr. Nayak who felt that there were no concerning findings on the scan  22. COVID-19 vaccination  · Patient received the Pfizer vaccine, second dose administered on 4/2/2021 without side effects.  · Patient will require third dose COVID-19 vaccine given her immunocompromise status.     Plan:  1. Continue Xeloda 1000 mg a.m., 1500 mg in the p.m. 7 days on followed by 7 days off (starting a new week of Xeloda today).  2. Monthly Xgeva today  3. Urinalysis and urine culture today  4. We will send in a refill for Pyridium  5. Continue Eliquis 5 mg twice daily.  6. Continue use of emollient cream and urea-based cream on the hands and feet and continue to wear socks and gloves at night as well as use of triamcinolone cream as prescribed by dermatology.  7. Continue omeprazole 40 mg daily and Carafate 1 g 4 times daily.    8. Continue xiidra ophthalmic drops prescribed by ophthalmology  9. Continue Provigil 200 mg daily per supportive oncology clinic.  Patient continues routine follow-up with supportive oncology.  10. We will make sure that the patient has follow-up with urogynecology Dr. Cheryl Johnson in regards to recent CT findings suggesting cystitis along with recurrence of dysuria and low back pain  11. Patient will be proceeding with a course of physical therapy for low back pain soon  12. In 4 weeks nurse practitioner visit with CBC, CMP, magnesium, phosphorus and Xgeva  13. In 8 weeks MD visit with CBC, CMP, magnesium, phosphorus and Xgeva  14. In 11 weeks CT chest abdomen pelvis and bone scan  15. MD visit in 12 weeks with CBC, CMP, magnesium, phosphorus and Xgeva.     Patient continuing on high risk medication requiring intensive monitoring.   Reviewed 3+ data  items in addition to personal review of scans with interpretation.

## 2021-08-23 ENCOUNTER — LAB (OUTPATIENT)
Dept: OTHER | Facility: HOSPITAL | Age: 61
End: 2021-08-23

## 2021-08-23 ENCOUNTER — INFUSION (OUTPATIENT)
Dept: ONCOLOGY | Facility: HOSPITAL | Age: 61
End: 2021-08-23

## 2021-08-23 ENCOUNTER — OFFICE VISIT (OUTPATIENT)
Dept: ONCOLOGY | Facility: CLINIC | Age: 61
End: 2021-08-23

## 2021-08-23 ENCOUNTER — SPECIALTY PHARMACY (OUTPATIENT)
Dept: ONCOLOGY | Facility: HOSPITAL | Age: 61
End: 2021-08-23

## 2021-08-23 VITALS
TEMPERATURE: 97.8 F | HEART RATE: 86 BPM | OXYGEN SATURATION: 97 % | BODY MASS INDEX: 34.82 KG/M2 | WEIGHT: 187.3 LBS | DIASTOLIC BLOOD PRESSURE: 83 MMHG | SYSTOLIC BLOOD PRESSURE: 133 MMHG

## 2021-08-23 DIAGNOSIS — C50.811 MALIGNANT NEOPLASM OF OVERLAPPING SITES OF RIGHT BREAST IN FEMALE, ESTROGEN RECEPTOR NEGATIVE (HCC): Primary | ICD-10-CM

## 2021-08-23 DIAGNOSIS — Z17.1 MALIGNANT NEOPLASM OF OVERLAPPING SITES OF RIGHT BREAST IN FEMALE, ESTROGEN RECEPTOR NEGATIVE (HCC): ICD-10-CM

## 2021-08-23 DIAGNOSIS — C50.811 MALIGNANT NEOPLASM OF OVERLAPPING SITES OF RIGHT BREAST IN FEMALE, ESTROGEN RECEPTOR NEGATIVE (HCC): ICD-10-CM

## 2021-08-23 DIAGNOSIS — Z17.1 MALIGNANT NEOPLASM OF OVERLAPPING SITES OF RIGHT BREAST IN FEMALE, ESTROGEN RECEPTOR NEGATIVE (HCC): Primary | ICD-10-CM

## 2021-08-23 DIAGNOSIS — R30.0 DYSURIA: ICD-10-CM

## 2021-08-23 LAB
ALBUMIN SERPL-MCNC: 4.1 G/DL (ref 3.5–5.2)
ALBUMIN/GLOB SERPL: 1.7 G/DL
ALP SERPL-CCNC: 64 U/L (ref 39–117)
ALT SERPL W P-5'-P-CCNC: 20 U/L (ref 1–33)
ANION GAP SERPL CALCULATED.3IONS-SCNC: 8.3 MMOL/L (ref 5–15)
AST SERPL-CCNC: 27 U/L (ref 1–32)
BASOPHILS # BLD AUTO: 0.03 10*3/MM3 (ref 0–0.2)
BASOPHILS NFR BLD AUTO: 0.5 % (ref 0–1.5)
BILIRUB SERPL-MCNC: 0.4 MG/DL (ref 0–1.2)
BILIRUB UR QL STRIP: NEGATIVE
BUN SERPL-MCNC: 16 MG/DL (ref 8–23)
BUN/CREAT SERPL: 18.4 (ref 7–25)
CALCIUM SPEC-SCNC: 9.3 MG/DL (ref 8.6–10.5)
CHLORIDE SERPL-SCNC: 104 MMOL/L (ref 98–107)
CLARITY UR: ABNORMAL
CO2 SERPL-SCNC: 31.7 MMOL/L (ref 22–29)
COLOR UR: YELLOW
CREAT SERPL-MCNC: 0.87 MG/DL (ref 0.57–1)
DEPRECATED RDW RBC AUTO: 58.9 FL (ref 37–54)
EOSINOPHIL # BLD AUTO: 0.15 10*3/MM3 (ref 0–0.4)
EOSINOPHIL NFR BLD AUTO: 2.4 % (ref 0.3–6.2)
ERYTHROCYTE [DISTWIDTH] IN BLOOD BY AUTOMATED COUNT: 16.1 % (ref 12.3–15.4)
GFR SERPL CREATININE-BSD FRML MDRD: 66 ML/MIN/1.73
GLOBULIN UR ELPH-MCNC: 2.4 GM/DL
GLUCOSE SERPL-MCNC: 103 MG/DL (ref 65–99)
GLUCOSE UR STRIP-MCNC: NEGATIVE MG/DL
HCT VFR BLD AUTO: 37.6 % (ref 34–46.6)
HGB BLD-MCNC: 12 G/DL (ref 12–15.9)
HGB UR QL STRIP.AUTO: ABNORMAL
IMM GRANULOCYTES # BLD AUTO: 0.02 10*3/MM3 (ref 0–0.05)
IMM GRANULOCYTES NFR BLD AUTO: 0.3 % (ref 0–0.5)
KETONES UR QL STRIP: NEGATIVE
LEUKOCYTE ESTERASE UR QL STRIP.AUTO: ABNORMAL
LYMPHOCYTES # BLD AUTO: 0.91 10*3/MM3 (ref 0.7–3.1)
LYMPHOCYTES NFR BLD AUTO: 14.5 % (ref 19.6–45.3)
MAGNESIUM SERPL-MCNC: 1.8 MG/DL (ref 1.6–2.4)
MCH RBC QN AUTO: 31.9 PG (ref 26.6–33)
MCHC RBC AUTO-ENTMCNC: 31.9 G/DL (ref 31.5–35.7)
MCV RBC AUTO: 100 FL (ref 79–97)
MONOCYTES # BLD AUTO: 0.69 10*3/MM3 (ref 0.1–0.9)
MONOCYTES NFR BLD AUTO: 11 % (ref 5–12)
NEUTROPHILS NFR BLD AUTO: 4.46 10*3/MM3 (ref 1.7–7)
NEUTROPHILS NFR BLD AUTO: 71.3 % (ref 42.7–76)
NITRITE UR QL STRIP: NEGATIVE
NRBC BLD AUTO-RTO: 0 /100 WBC (ref 0–0.2)
PH UR STRIP.AUTO: 6 [PH] (ref 5–8)
PHOSPHATE SERPL-MCNC: 2.7 MG/DL (ref 2.5–4.5)
PLATELET # BLD AUTO: 296 10*3/MM3 (ref 140–450)
PMV BLD AUTO: 10.5 FL (ref 6–12)
POTASSIUM SERPL-SCNC: 4.5 MMOL/L (ref 3.5–5.2)
PROT SERPL-MCNC: 6.5 G/DL (ref 6–8.5)
PROT UR QL STRIP: NEGATIVE
RBC # BLD AUTO: 3.76 10*6/MM3 (ref 3.77–5.28)
SODIUM SERPL-SCNC: 144 MMOL/L (ref 136–145)
SP GR UR STRIP: 1.01 (ref 1–1.03)
UROBILINOGEN UR QL STRIP: ABNORMAL
WBC # BLD AUTO: 6.26 10*3/MM3 (ref 3.4–10.8)

## 2021-08-23 PROCEDURE — 83735 ASSAY OF MAGNESIUM: CPT | Performed by: INTERNAL MEDICINE

## 2021-08-23 PROCEDURE — 87086 URINE CULTURE/COLONY COUNT: CPT | Performed by: INTERNAL MEDICINE

## 2021-08-23 PROCEDURE — 36415 COLL VENOUS BLD VENIPUNCTURE: CPT

## 2021-08-23 PROCEDURE — 81001 URINALYSIS AUTO W/SCOPE: CPT | Performed by: INTERNAL MEDICINE

## 2021-08-23 PROCEDURE — 80053 COMPREHEN METABOLIC PANEL: CPT | Performed by: INTERNAL MEDICINE

## 2021-08-23 PROCEDURE — 87077 CULTURE AEROBIC IDENTIFY: CPT | Performed by: INTERNAL MEDICINE

## 2021-08-23 PROCEDURE — 84100 ASSAY OF PHOSPHORUS: CPT | Performed by: INTERNAL MEDICINE

## 2021-08-23 PROCEDURE — 87186 SC STD MICRODIL/AGAR DIL: CPT | Performed by: INTERNAL MEDICINE

## 2021-08-23 PROCEDURE — 96372 THER/PROPH/DIAG INJ SC/IM: CPT

## 2021-08-23 PROCEDURE — 85025 COMPLETE CBC W/AUTO DIFF WBC: CPT | Performed by: INTERNAL MEDICINE

## 2021-08-23 PROCEDURE — 99215 OFFICE O/P EST HI 40 MIN: CPT | Performed by: INTERNAL MEDICINE

## 2021-08-23 PROCEDURE — 25010000002 DENOSUMAB 120 MG/1.7ML SOLUTION: Performed by: INTERNAL MEDICINE

## 2021-08-23 RX ADMIN — DENOSUMAB 120 MG: 120 INJECTION SUBCUTANEOUS at 14:09

## 2021-08-23 NOTE — PROGRESS NOTES
Patient left [facility] without seeing pharmacist today. Will attempt telemedicine visit again with next visit at [place].   Elizabeth Schafer, PharmD  8/23/2021  14:57 EDT

## 2021-08-24 ENCOUNTER — TELEPHONE (OUTPATIENT)
Dept: ONCOLOGY | Facility: CLINIC | Age: 61
End: 2021-08-24

## 2021-08-24 LAB
BACTERIA UR QL AUTO: ABNORMAL /HPF
HYALINE CASTS UR QL AUTO: ABNORMAL /LPF
RBC # UR: ABNORMAL /HPF
REF LAB TEST METHOD: ABNORMAL
SQUAMOUS #/AREA URNS HPF: ABNORMAL /HPF
WBC UR QL AUTO: ABNORMAL /HPF

## 2021-08-24 RX ORDER — PHENAZOPYRIDINE HYDROCHLORIDE 200 MG/1
200 TABLET, FILM COATED ORAL 3 TIMES DAILY PRN
Qty: 12 TABLET | Refills: 0 | Status: SHIPPED | OUTPATIENT
Start: 2021-08-24 | End: 2021-09-10 | Stop reason: SDUPTHER

## 2021-08-24 RX ORDER — CAPECITABINE 500 MG/1
TABLET, FILM COATED ORAL
Qty: 70 TABLET | Refills: 3 | Status: SHIPPED | OUTPATIENT
Start: 2021-08-24 | End: 2021-12-29 | Stop reason: SDUPTHER

## 2021-08-24 RX ORDER — NITROFURANTOIN 25; 75 MG/1; MG/1
100 CAPSULE ORAL 2 TIMES DAILY
Qty: 10 CAPSULE | Refills: 0 | Status: SHIPPED | OUTPATIENT
Start: 2021-08-24 | End: 2021-09-13 | Stop reason: SDUPTHER

## 2021-08-24 NOTE — TELEPHONE ENCOUNTER
PT CALLING ASKING FOR ABO AND RF ON PYRIDIUM. PT STATES THAT HER URINARY SYMPTOMS ARE WORSE TODAY AND HER APPT W/ UROGYN ISN'T FOR 4 WKS. PT HAD U/A AND MICRO CHECKED YESTERDAY AND SAW DR. RICHARDS. WILL D/W DR. RICHARDS. PT V/U.     PER DR. RICHARDS OK TO GO AHEAD AND PRESCRIBE MACROBID 100MG BID X 5 D AND IF CX COMES BACK AND WE NEED TO CHANGE ABO WE WILL. PT MADE AWARE AND SHE V/U. RX'S FOR MACROBID AND PYRIDIUM E-SCRIBED TO [facility].

## 2021-08-25 ENCOUNTER — TELEPHONE (OUTPATIENT)
Dept: ONCOLOGY | Facility: CLINIC | Age: 61
End: 2021-08-25

## 2021-08-25 LAB — BACTERIA SPEC AEROBE CULT: ABNORMAL

## 2021-08-25 RX ORDER — SULFAMETHOXAZOLE AND TRIMETHOPRIM 800; 160 MG/1; MG/1
1 TABLET ORAL 2 TIMES DAILY
Qty: 14 TABLET | Refills: 0 | Status: SHIPPED | OUTPATIENT
Start: 2021-08-25 | End: 2021-09-01

## 2021-08-25 RX ORDER — TRAMADOL HYDROCHLORIDE 50 MG/1
50 TABLET ORAL EVERY 8 HOURS PRN
Qty: 90 TABLET | Refills: 0 | Status: SHIPPED | OUTPATIENT
Start: 2021-08-25 | End: 2021-09-28

## 2021-08-25 NOTE — TELEPHONE ENCOUNTER
Called pt. Informed pt of abx change. She V/U. E-scribed to pt [facility] pharmacy.   ----- Message from Ubaldo Hancock Jr., MD sent at 8/25/2021 11:41 AM EDT -----  Can you switch her to bactrim ds 1 po bid x 7 days?  ----- Message -----  From: Lab, Background User  Sent: 8/23/2021   5:08 PM EDT  To: Ubaldo Hancock Jr., MD

## 2021-08-26 ENCOUNTER — OFFICE VISIT (OUTPATIENT)
Dept: PSYCHIATRY | Facility: HOSPITAL | Age: 61
End: 2021-08-26

## 2021-08-26 DIAGNOSIS — R53.0 NEOPLASTIC MALIGNANT RELATED FATIGUE: ICD-10-CM

## 2021-08-26 DIAGNOSIS — F33.1 MAJOR DEPRESSIVE DISORDER, RECURRENT EPISODE, MODERATE (HCC): Primary | ICD-10-CM

## 2021-08-26 DIAGNOSIS — F41.1 GENERALIZED ANXIETY DISORDER: ICD-10-CM

## 2021-08-26 DIAGNOSIS — G47.33 OSA (OBSTRUCTIVE SLEEP APNEA): ICD-10-CM

## 2021-08-26 PROCEDURE — 99214 OFFICE O/P EST MOD 30 MIN: CPT | Performed by: NURSE PRACTITIONER

## 2021-08-26 RX ORDER — APIXABAN 5 MG/1
TABLET, FILM COATED ORAL
Qty: 180 TABLET | Refills: 1 | Status: SHIPPED | OUTPATIENT
Start: 2021-08-26 | End: 2022-01-10

## 2021-08-26 NOTE — PROGRESS NOTES
[facility]  In Person Session    Subjective  Patient ID: Martha Davey is a 60 y.o. female is seen face to face in the [facility] ([facility].  Patient is being seen in follow-up for symptoms of depression and anxiety alongside progressive breast cancer diagnosis.    CC: Stress in family environment, dysthymia, anxiety    HPI: Pt presents to clinic for follow up regarding medication management of depression and anxiety alongside metastatic breast cancer. PHQ 9 and FLACO 7 sx remain stable on current Cymbalta 30 mg twice daily, perceived by patient as being well managed. Sleep remains generally appropriate, assisted by daily use of Restoril.  Pt does report readdition of modafinil 1/2 tab daily with appreciation of benefit to fatigue with increased activity level. Pt does report appreciation of walking with  at [employer] every other day, and reports recent enjoyment going to [place] with friends.  Home life remains stressful with continued care for various individuals including mother, son, and .  She continues to identify challenges delegating and allowing others to assist with necessary tasks.  Patient denies having connected with outside therapist as previously discussed.    Review of Systems   Constitutional: Positive for fatigue.   Psychiatric/Behavioral: Positive for sleep disturbance. Negative for dysphoric mood. The patient is nervous/anxious.      Medications Reviewed:  Cymbalta 30 mg twice daily  Modafinil 100 mg daily    Diagnoses and all orders for this visit:    1. Major depressive disorder, recurrent episode, moderate (CMS/HCC) (Primary)    2. MARIA M (obstructive sleep apnea)    3. Neoplastic malignant related fatigue    4. Generalized anxiety disorder      Plan of Care  Patient with generally stable symptoms of generalized anxiety disorder and major depressive disorder on current Cymbalta 60 mg daily.  Recommend continuing as written.  Cancer  related fatigue assisted by modafinil, 100 mg daily.  Continue to explore with patient benefits of continuing behavioral activation, walking regularly.  Explored reengagement and peers support through [employer], as well as connection to [facility] for caregiver support and individual counseling as previously discussed.  Patient is agreeable to interventions.  Follow-up arranged in clinic in 8 weeks.    I spent 33 minutes caring for Martha on this date of service. This time includes time spent by me in the following activities: preparing for the visit, obtaining and/or reviewing a separately obtained history, performing a medically appropriate examination and/or evaluation, counseling and educating the patient/family/caregiver, ordering medications, tests, or procedures and documenting information in the medical record.

## 2021-08-30 ENCOUNTER — TELEPHONE (OUTPATIENT)
Dept: PHARMACY | Facility: HOSPITAL | Age: 61
End: 2021-08-30

## 2021-08-30 NOTE — TELEPHONE ENCOUNTER
[facility]    Called regarding refill of medication: Xeloda  Spoke with patient.    She still has a large supply remaining of her Xeloda ; requested we call back on Tuesday, 9/7.     Lennie Schafer, PharmD  8/30/2021  16:02 EDT

## 2021-08-31 ENCOUNTER — TELEPHONE (OUTPATIENT)
Dept: ONCOLOGY | Facility: CLINIC | Age: 61
End: 2021-08-31

## 2021-08-31 ENCOUNTER — TELEPHONE (OUTPATIENT)
Dept: ONCOLOGY | Facility: HOSPITAL | Age: 61
End: 2021-08-31

## 2021-08-31 NOTE — TELEPHONE ENCOUNTER
Pt sees NP Tiffany on 9/20, but last appt with Dr. Hancock she was told something showed on last scan and asked if anything was bothering her.  She did not report anything to MD, but later realized she has a cough.   Last scan includes this finding:   New mild tree-in-bud nodularity in the right lower lobe is likely   infectious/inflammatory. Attention on follow-up imaging is recommended     Pt states she now is concerned b/c she has had a productive (yellow mucous) cough and raspy voice for approx 6 months.  She has no other symptoms, but wants to be sure she doesn't need any follow up scans before seeing Tiffany since she didn't make any of these complaints to you.  She also mentioned having reflux and concerned she may be aspirating from reflux episodes.     Per Dr. Hancock he does not see any need for follow scans and considers this a chronic cough.  Pt informed and encouraged to talk with NP if any changes and to continue to monitor.  Pt v/u.

## 2021-09-03 ENCOUNTER — TREATMENT (OUTPATIENT)
Dept: PHYSICAL THERAPY | Facility: CLINIC | Age: 61
End: 2021-09-03

## 2021-09-03 DIAGNOSIS — M25.512 CHRONIC LEFT SHOULDER PAIN: ICD-10-CM

## 2021-09-03 DIAGNOSIS — G89.29 CHRONIC BILATERAL LOW BACK PAIN WITHOUT SCIATICA: ICD-10-CM

## 2021-09-03 DIAGNOSIS — Z74.09 IMPAIRED MOBILITY: ICD-10-CM

## 2021-09-03 DIAGNOSIS — G89.29 CHRONIC LEFT SHOULDER PAIN: ICD-10-CM

## 2021-09-03 DIAGNOSIS — M54.2 CERVICAL SPINE PAIN: Primary | ICD-10-CM

## 2021-09-03 DIAGNOSIS — M54.50 CHRONIC BILATERAL LOW BACK PAIN WITHOUT SCIATICA: ICD-10-CM

## 2021-09-03 DIAGNOSIS — M54.6 THORACIC SPINE PAIN: ICD-10-CM

## 2021-09-03 PROCEDURE — 97162 PT EVAL MOD COMPLEX 30 MIN: CPT | Performed by: PHYSICAL THERAPIST

## 2021-09-03 PROCEDURE — 97530 THERAPEUTIC ACTIVITIES: CPT | Performed by: PHYSICAL THERAPIST

## 2021-09-03 NOTE — PROGRESS NOTES
Physical Therapy Initial Evaluation and Plan of Care        Patient: Martha Davey   : 1960  Diagnosis/ICD-10 Code:  Cervical spine pain [M54.2]  Referring practitioner: Quan Nayak MD  Date of Initial Visit: 9/3/2021  Today's Date: 9/3/2021  Patient seen for 1 sessions    Progress Note Due: 10/01/2021       Subjective Questionnaire: NDI: 2045 (omitted work question)  Oswestry:       Subjective Evaluation    History of Present Illness  Mechanism of injury: The patient reports to the clinic with chronic history of neck pain, low back pain, and left shoulder pain that began around 2017. The patient was diagnosed with breast cancer in  that returned and had metastasized in 2017. The patient was going to rehab at the time, but broke her left leg while in the rehab facility during a transfer. Since this time, the patient had surgery on that same leg and her spine where she had a fracture at T6. The patient has extensive osteoporosis, which is a contributing factor. Her neck pain worsens with reading, typing/computer work, sleeping and driving. Headaches are moderate and come infrequently. Lumbar pain worsens with standing more than 30 minutes and sitting more than 60 minutes. Patient is unable to reach overhead with the left shoulder. PMH is extensive including: breast cancer, osteoporosis, Charcot Saloni Tooth, neuropathy, GERD, MARIA M (non-compliant with cpap use), HTN, cholecystectomy, hysterectomy, and previous PE.       Patient Occupation: Retired  Pain  Current pain ratin  At best pain rating: 3  At worst pain ratin  Location: Neck   Relieving factors: medications, rest and change in position  Aggravating factors: keyboarding, prolonged positioning and repetitive movement  Progression: worsening    Patient Goals  Patient goals for therapy: decreased pain, independence with ADLs/IADLs and return to sport/leisure activities         Objective          Static Posture      Head  Forward.    Shoulders  Elevated and rounded.    Lumbar Spine   Flattened.     Palpation   Left   Hypertonic in the scalenes and upper trapezius.   Tenderness of the levator scapulae, scalenes, suboccipitals and upper trapezius.     Right Tenderness of the levator scapulae, scalenes, suboccipitals and upper trapezius.     Additional Palpation Details  B splenius - tender     Tenderness   Cervical Spine   No tenderness in the left 1st rib and right 1st rib.     Active Range of Motion   Left Shoulder   Flexion: 90 degrees   Abduction: 70 degrees   External rotation 90°: 0 degrees     Right Shoulder   Normal active range of motion    Additional Active Range of Motion Details  Cervical active range of motion:   Flexion: Limited by 25% (painful)  Extension: Limited by 25%  Right side bending: Limited by 50%  Ankur side bending: Limited by 25%  Right rotation: Limited by 25%  Left rotation: Limited by 50%  Thoracolumbar spine AROM:  Flexion: Limited by 25% - performs with flat back (with dizziness)  Extension: Limited by 90%  Right side bending: Limited by 30%  Left side bending: Limited by 45%  Right rotation: Limited by 25%   Left rotation: Limited by 25% (painful)     Strength/Myotome Testing   Cervical Spine   Neck flexion: 4-    Right Shoulder     Planes of Motion   Flexion: 4+   Abduction: 4+     Left Hip   Planes of Motion   Flexion: 4+  Abduction: 4+  Adduction: 4+    Right Hip   Planes of Motion   Flexion: 4+  Abduction: 4+  Adduction: 4+    Left Knee   Flexion: 4+  Extension: 4-    Right Knee   Flexion: 4+  Extension: 4    Ambulation   Weight-Bearing Status     Additional Weight-Bearing Status Details  Walking stick    Quality of Movement During Gait     Knee    Knee (Left): Positive increased ER tibial torsion.   Knee (Right): Positive increased ER tibial torsion.     Additional Quality of Movement During Gait Details  Wears B AFOs due to foot drop       Education: Results of examination, home exercise  program, plan of care/treatment interventions & expectation, regional interdependence     Assessment & Plan     Assessment  Impairments: abnormal gait, abnormal muscle tone, abnormal or restricted ROM, activity intolerance, impaired physical strength, lacks appropriate home exercise program and pain with function  Assessment details: The patient is a 60 year old female that reports to the clinic with complaints of chronic neck, low back, and left shoulder pain. The patient exhibits a capsular pattern of the left shoulder and shows signs consistent with left adhesive capsulitis. Additionally, the left side of the neck is hypertonic and less mobile than the right. She is limited in cervical active range of motion, primarily with flexion, extension, left rotation, and right side bending. These impairments are limiting her ability to sleep, drive, read, type/keyboard, and reach overhead. She also has impaired lumbar range of motion and lower extremity strength. Spinal extension is most impaired. These factors are contributing to pain and restriction with standing more than 30 minutes and sitting more than 60 minutes. She would benefit from skilled physical therapy in order to address the stated deficits and return to her previous level of function.    Prognosis: fair  Functional Limitations: sleeping, uncomfortable because of pain, sitting, standing, reaching behind back, reaching overhead and unable to perform repetitive tasks  Goals  Plan Goals: Short Term: 4 weeks  1. The patient will be compliant with home exercise program, requiring minimal verbal and or tactile cues to perform.  2. The patient will improve left shoulder active scaption range of motion to 115 degrees in order to perform overhead reaching activities for house work.   3. The patient will report neck pain <4/10 with reading, keyboarding and driving.     Long Term: 8 weeks   1. The patient will be independent with discharge home exercise program.   2.  The patient will improve left shoulder active scaption range of motion to 140 degrees in order to perform overhead reaching activities for house work.   3. The patient will report neck pain <2/10 with reading, keyboarding and driving.   4. The patient will improve NDI score from 20/45 (omit work question) to 11/45 in order to return to driving and reading with lessened restriction.     Plan  Therapy options: will be seen for skilled physical therapy services  Planned modality interventions: cryotherapy and thermotherapy (hydrocollator packs)  Planned therapy interventions: balance/weight-bearing training, flexibility, functional ROM exercises, gait training, home exercise program, joint mobilization, manual therapy, neuromuscular re-education, postural training, soft tissue mobilization, spinal/joint mobilization, strengthening, stretching and therapeutic activities  Frequency: 2x week  Duration in weeks: 8  Treatment plan discussed with: patient        Visit Diagnoses:    ICD-10-CM ICD-9-CM   1. Cervical spine pain  M54.2 723.1   2. Thoracic spine pain  M54.6 724.1   3. Chronic bilateral low back pain without sciatica  M54.5 724.2    G89.29 338.29   4. Chronic left shoulder pain  M25.512 719.41    G89.29 338.29   5. Impaired mobility  Z74.09 799.89       Timed:  Manual Therapy:   6      mins  00374;  Therapeutic Exercise:         mins  13392;     Neuromuscular Chanel:  5      mins  72294;    Therapeutic Activity:    15      mins  91297;     Gait Training:           mins  93612;     Ultrasound:          mins  33191;    Electrical Stimulation:         mins  38727 ( );    Untimed:  Electrical Stimulation:         mins  07569 ( );  Mechanical Traction:         mins  02877;     Timed Treatment:  43    mins   Total Treatment:    45    mins    PT SIGNATURE: Cassie Morales, VANNA         Initial Certification  Certification Period: 9/3/2021 thru 12/2/2021  I certify that the therapy services are furnished while  this patient is under my care.  The services outlined above are required by this patient, and will be reviewed every 90 days.     PHYSICIAN: Quan Nayak MD      DATE:     Please sign and return via fax to [email or web address] . Thank you, [facility].

## 2021-09-07 ENCOUNTER — TELEPHONE (OUTPATIENT)
Dept: PHARMACY | Facility: HOSPITAL | Age: 61
End: 2021-09-07

## 2021-09-07 NOTE — TELEPHONE ENCOUNTER
[facility] Out[facility]    Called regarding refill of medication: Schuyler   Spoke with patient.    Assessment  • It looks like it is almost time for your refill, how many doses do you have left? About 2 weeks worth  • How are you doing on the medication, are you experiencing any side effects? Patient denies side effects   • How many doses have you missed since you last filled your prescription? 0 doses missed.  • Have you stopped or started any medications since we last spoke? Patient has had no medication changes since last month.  and Patient has started Bactrim for a UTI and has finished the therapy. . Pharmacist will be made aware of medication changes.     Shipment   • We can go ahead and coordinate your next refill, would you like to pick this up at the pharmacy/curbside, or have this delivered to you vs. the clinic?   o If government plan (Medicaid and Medicare B), delivery will require signature -> identify a day the patient will be home for delivery set up (note the package should arrive prior to noon as pharmacy will ship priority overnight)  • Patient was advised medication will be shipped via [employer] (priority, signature required) on 9/16/2021 with expected delivery on 9/17/2021. Shipping comments patient is requesting of [employer] : NONE. .   • Shipping address confirmed: [address] [place] [address]  • Patient is aware and willing to pay copay of $0.   • Do you have any questions for the pharmacist? No.  • Ensured patient has clinic contact information for questions.   I WILL CONTACT [facility] ON 9/10 TO INFORM THEM OF THE 9/16 SHIPMENT.     Yolanda Meyers, Pharmacy Technician  9/7/2021  13:16 EDT

## 2021-09-08 ENCOUNTER — TELEPHONE (OUTPATIENT)
Dept: ONCOLOGY | Facility: CLINIC | Age: 61
End: 2021-09-08

## 2021-09-08 DIAGNOSIS — R30.0 DYSURIA: Primary | ICD-10-CM

## 2021-09-08 NOTE — TELEPHONE ENCOUNTER
Called pt, asked her if she has seen urogynocology yet. She states she has an appointment with them in about 2 weeks. Informed Dr. Hancock of this and per his order pt to come in and do a UA and culture before we prescribe another antibiotic. Called pt. Informed her of this. She V/U.

## 2021-09-08 NOTE — TELEPHONE ENCOUNTER
Pt was diagnosed w/ a UTI about 2 weeks ago, finished Bactrim a week ago and reports symptoms gone for about 2 days and then returned but not as severe. She reports burning, frequency, foul smell to urine and that it is slightly cloudy. Info forwarded to Tiffany LEE to review w/ Dr. Hancock.

## 2021-09-08 NOTE — TELEPHONE ENCOUNTER
----- Message from She Maria RN sent at 9/8/2021  3:39 PM EDT -----  Pt needs to go to EP for UA and culture. Not sure if she needs an appointment or not. If so, she would like to go tomorrow morning. Please let her know if she can just go or if she needs an appointment. Thanks!

## 2021-09-09 ENCOUNTER — LAB (OUTPATIENT)
Dept: OTHER | Facility: HOSPITAL | Age: 61
End: 2021-09-09

## 2021-09-09 DIAGNOSIS — R30.0 DYSURIA: ICD-10-CM

## 2021-09-09 LAB
BACTERIA UR QL AUTO: ABNORMAL /HPF
BILIRUB UR QL STRIP: NEGATIVE
CLARITY UR: ABNORMAL
COLOR UR: YELLOW
GLUCOSE UR STRIP-MCNC: NEGATIVE MG/DL
HGB UR QL STRIP.AUTO: ABNORMAL
HYALINE CASTS UR QL AUTO: ABNORMAL /LPF
KETONES UR QL STRIP: NEGATIVE
LEUKOCYTE ESTERASE UR QL STRIP.AUTO: ABNORMAL
NITRITE UR QL STRIP: NEGATIVE
PH UR STRIP.AUTO: 8.5 [PH] (ref 5–8)
PROT UR QL STRIP: ABNORMAL
RBC # UR: ABNORMAL /HPF
REF LAB TEST METHOD: ABNORMAL
SP GR UR STRIP: 1.02 (ref 1–1.03)
SQUAMOUS #/AREA URNS HPF: ABNORMAL /HPF
UROBILINOGEN UR QL STRIP: ABNORMAL
WBC UR QL AUTO: ABNORMAL /HPF

## 2021-09-09 PROCEDURE — 87086 URINE CULTURE/COLONY COUNT: CPT | Performed by: INTERNAL MEDICINE

## 2021-09-09 PROCEDURE — 81001 URINALYSIS AUTO W/SCOPE: CPT | Performed by: INTERNAL MEDICINE

## 2021-09-09 PROCEDURE — 87186 SC STD MICRODIL/AGAR DIL: CPT | Performed by: INTERNAL MEDICINE

## 2021-09-09 PROCEDURE — 87077 CULTURE AEROBIC IDENTIFY: CPT | Performed by: INTERNAL MEDICINE

## 2021-09-10 ENCOUNTER — TELEPHONE (OUTPATIENT)
Dept: ONCOLOGY | Facility: CLINIC | Age: 61
End: 2021-09-10

## 2021-09-10 RX ORDER — SULFAMETHOXAZOLE AND TRIMETHOPRIM 800; 160 MG/1; MG/1
1 TABLET ORAL 2 TIMES DAILY
Qty: 20 TABLET | Refills: 0 | Status: SHIPPED | OUTPATIENT
Start: 2021-09-10 | End: 2021-09-20

## 2021-09-10 RX ORDER — PHENAZOPYRIDINE HYDROCHLORIDE 200 MG/1
200 TABLET, FILM COATED ORAL 3 TIMES DAILY PRN
Qty: 12 TABLET | Refills: 0 | Status: SHIPPED | OUTPATIENT
Start: 2021-09-10 | End: 2021-11-15 | Stop reason: SDUPTHER

## 2021-09-10 NOTE — TELEPHONE ENCOUNTER
Reviewed Urine results with Dr. Hancock and per his order send in Bactrim DS BID x 10 days. Called pt. Informed her of Dr. Hancock orders. She V/U and requested some more pyridium. Dr. Hancock okay with sending that in too. E-scribed both to pts pharmacy.

## 2021-09-11 LAB — BACTERIA SPEC AEROBE CULT: ABNORMAL

## 2021-09-13 ENCOUNTER — TELEPHONE (OUTPATIENT)
Dept: ONCOLOGY | Facility: CLINIC | Age: 61
End: 2021-09-13

## 2021-09-13 ENCOUNTER — TREATMENT (OUTPATIENT)
Dept: PHYSICAL THERAPY | Facility: CLINIC | Age: 61
End: 2021-09-13

## 2021-09-13 DIAGNOSIS — G89.29 CHRONIC BILATERAL LOW BACK PAIN WITHOUT SCIATICA: ICD-10-CM

## 2021-09-13 DIAGNOSIS — M54.50 CHRONIC BILATERAL LOW BACK PAIN WITHOUT SCIATICA: ICD-10-CM

## 2021-09-13 DIAGNOSIS — M54.6 THORACIC SPINE PAIN: Primary | ICD-10-CM

## 2021-09-13 DIAGNOSIS — M54.2 CERVICAL SPINE PAIN: ICD-10-CM

## 2021-09-13 PROCEDURE — 97110 THERAPEUTIC EXERCISES: CPT | Performed by: PHYSICAL THERAPIST

## 2021-09-13 PROCEDURE — 97530 THERAPEUTIC ACTIVITIES: CPT | Performed by: PHYSICAL THERAPIST

## 2021-09-13 PROCEDURE — 97140 MANUAL THERAPY 1/> REGIONS: CPT | Performed by: PHYSICAL THERAPIST

## 2021-09-13 RX ORDER — NITROFURANTOIN 25; 75 MG/1; MG/1
100 CAPSULE ORAL 2 TIMES DAILY
Qty: 14 CAPSULE | Refills: 0 | Status: SHIPPED | OUTPATIENT
Start: 2021-09-13 | End: 2021-09-20

## 2021-09-13 NOTE — PROGRESS NOTES
Physical Therapy Daily Treatment Note      Patient: Martha Davey   : 1960  Referring practitioner: Quan Nayak MD  Date of Initial Visit: Type: THERAPY  Noted: 9/3/2021  Today's Date: 2021  Patient seen for 2 sessions         Martha Davey reports: tightness/stiff in neck and LB        Subjective     Objective   See Exercise, Manual, and Modality Logs for complete treatment.   Exercise rationale/ pain free exercise performance  Anatomy and structure of affected musculature  Posture/Postural awareness  Lumbar support  Sleeping positions with pillows  Alternate exercise positions  Verbal/Tactile cues to ensure correct exercise performance/technique    Added: sktc, ltr, ppt , bridge      Assessment/Plan  Positive response to manual intervention this session in regards to improved cervical mobility with subsequent decrease in abnormal mm tone/tightness.   Able to progress exercise regimen without increased symptoms.   Benefits from verbal/tactile cues to ensure proper posture, exercise technique and performance for affected musculature. Should improve with continued PT intervention.          Progress strengthening /stabilization /functional activity as symptoms allow.            Timed:  Manual Therapy:    18   mins  58572;  Therapeutic Exercise:     20    mins  61618;     Neuromuscular Chanel:        mins  39801;    Therapeutic Activity:     10     mins  93357;     Gait Training:           mins  34267;     Ultrasound:          mins  29681;      Untimed:  Electrical Stimulation:         mins  09358 ( );  Mechanical Traction:         mins  18739;     Timed Treatment:  48    mins   Total Treatment:    60    mins  Sav Tovar PTA  Physical Therapist  Assistant  [id number]

## 2021-09-13 NOTE — TELEPHONE ENCOUNTER
Called pt and informed her of the switch of abx. She V/U and asked to be sent to [facility] on [address]. E-scribed to pt pharmacy.   ----- Message from Ubaldo Hancock Jr., MD sent at 9/13/2021  7:43 AM EDT -----  We will need to switch the patient from Bactrim to nitrofurantoin 100 mg twice daily x7 days. Thanks!  ----- Message -----  From: Lab, Background User  Sent: 9/9/2021  10:17 AM EDT  To: Ubaldo Hancock Jr., MD

## 2021-09-14 ENCOUNTER — HOSPITAL ENCOUNTER (OUTPATIENT)
Dept: BONE DENSITY | Facility: HOSPITAL | Age: 61
Discharge: HOME | End: 2021-09-14
Admitting: INTERNAL MEDICINE

## 2021-09-14 PROCEDURE — 77080 DXA BONE DENSITY AXIAL: CPT

## 2021-09-15 ENCOUNTER — TELEPHONE (OUTPATIENT)
Dept: PHYSICAL THERAPY | Facility: CLINIC | Age: 61
End: 2021-09-15

## 2021-09-20 ENCOUNTER — SPECIALTY PHARMACY (OUTPATIENT)
Dept: ONCOLOGY | Facility: HOSPITAL | Age: 61
End: 2021-09-20

## 2021-09-20 ENCOUNTER — INFUSION (OUTPATIENT)
Dept: ONCOLOGY | Facility: HOSPITAL | Age: 61
End: 2021-09-20

## 2021-09-20 ENCOUNTER — OFFICE VISIT (OUTPATIENT)
Dept: ONCOLOGY | Facility: CLINIC | Age: 61
End: 2021-09-20

## 2021-09-20 ENCOUNTER — LAB (OUTPATIENT)
Dept: OTHER | Facility: HOSPITAL | Age: 61
End: 2021-09-20

## 2021-09-20 VITALS
RESPIRATION RATE: 16 BRPM | DIASTOLIC BLOOD PRESSURE: 70 MMHG | SYSTOLIC BLOOD PRESSURE: 111 MMHG | OXYGEN SATURATION: 96 % | BODY MASS INDEX: 35.3 KG/M2 | HEART RATE: 78 BPM | WEIGHT: 187 LBS | HEIGHT: 61 IN | TEMPERATURE: 97.5 F

## 2021-09-20 DIAGNOSIS — C79.51 BONE METASTASES: ICD-10-CM

## 2021-09-20 DIAGNOSIS — K21.9 GASTROESOPHAGEAL REFLUX DISEASE, UNSPECIFIED WHETHER ESOPHAGITIS PRESENT: ICD-10-CM

## 2021-09-20 DIAGNOSIS — C50.811 MALIGNANT NEOPLASM OF OVERLAPPING SITES OF RIGHT BREAST IN FEMALE, ESTROGEN RECEPTOR NEGATIVE (HCC): Primary | ICD-10-CM

## 2021-09-20 DIAGNOSIS — L27.1 PALMAR PLANTAR ERYTHRODYSAESTHESIA DUE TO CYTOTOXIC THERAPY: ICD-10-CM

## 2021-09-20 DIAGNOSIS — C50.811 MALIGNANT NEOPLASM OF OVERLAPPING SITES OF RIGHT BREAST IN FEMALE, ESTROGEN RECEPTOR NEGATIVE (HCC): ICD-10-CM

## 2021-09-20 DIAGNOSIS — Z17.1 MALIGNANT NEOPLASM OF OVERLAPPING SITES OF RIGHT BREAST IN FEMALE, ESTROGEN RECEPTOR NEGATIVE (HCC): ICD-10-CM

## 2021-09-20 DIAGNOSIS — Z17.1 MALIGNANT NEOPLASM OF OVERLAPPING SITES OF RIGHT BREAST IN FEMALE, ESTROGEN RECEPTOR NEGATIVE (HCC): Primary | ICD-10-CM

## 2021-09-20 DIAGNOSIS — Z79.899 HIGH RISK MEDICATION USE: ICD-10-CM

## 2021-09-20 LAB
ALBUMIN SERPL-MCNC: 4.1 G/DL (ref 3.5–5.2)
ALBUMIN/GLOB SERPL: 1.5 G/DL
ALP SERPL-CCNC: 71 U/L (ref 39–117)
ALT SERPL W P-5'-P-CCNC: 20 U/L (ref 1–33)
ANION GAP SERPL CALCULATED.3IONS-SCNC: 8.2 MMOL/L (ref 5–15)
AST SERPL-CCNC: 37 U/L (ref 1–32)
BASOPHILS # BLD AUTO: 0.04 10*3/MM3 (ref 0–0.2)
BASOPHILS NFR BLD AUTO: 1.2 % (ref 0–1.5)
BILIRUB SERPL-MCNC: 0.4 MG/DL (ref 0–1.2)
BUN SERPL-MCNC: 19 MG/DL (ref 8–23)
BUN/CREAT SERPL: 22.4 (ref 7–25)
CALCIUM SPEC-SCNC: 9 MG/DL (ref 8.6–10.5)
CHLORIDE SERPL-SCNC: 105 MMOL/L (ref 98–107)
CO2 SERPL-SCNC: 29.8 MMOL/L (ref 22–29)
CREAT SERPL-MCNC: 0.85 MG/DL (ref 0.57–1)
DEPRECATED RDW RBC AUTO: 59.8 FL (ref 37–54)
EOSINOPHIL # BLD AUTO: 0.15 10*3/MM3 (ref 0–0.4)
EOSINOPHIL NFR BLD AUTO: 4.4 % (ref 0.3–6.2)
ERYTHROCYTE [DISTWIDTH] IN BLOOD BY AUTOMATED COUNT: 16.6 % (ref 12.3–15.4)
GFR SERPL CREATININE-BSD FRML MDRD: 68 ML/MIN/1.73
GLOBULIN UR ELPH-MCNC: 2.7 GM/DL
GLUCOSE SERPL-MCNC: 111 MG/DL (ref 65–99)
HCT VFR BLD AUTO: 37.5 % (ref 34–46.6)
HGB BLD-MCNC: 12 G/DL (ref 12–15.9)
IMM GRANULOCYTES # BLD AUTO: 0 10*3/MM3 (ref 0–0.05)
IMM GRANULOCYTES NFR BLD AUTO: 0 % (ref 0–0.5)
LYMPHOCYTES # BLD AUTO: 0.87 10*3/MM3 (ref 0.7–3.1)
LYMPHOCYTES NFR BLD AUTO: 25.5 % (ref 19.6–45.3)
MAGNESIUM SERPL-MCNC: 2.1 MG/DL (ref 1.6–2.4)
MCH RBC QN AUTO: 32 PG (ref 26.6–33)
MCHC RBC AUTO-ENTMCNC: 32 G/DL (ref 31.5–35.7)
MCV RBC AUTO: 100 FL (ref 79–97)
MONOCYTES # BLD AUTO: 0.44 10*3/MM3 (ref 0.1–0.9)
MONOCYTES NFR BLD AUTO: 12.9 % (ref 5–12)
NEUTROPHILS NFR BLD AUTO: 1.91 10*3/MM3 (ref 1.7–7)
NEUTROPHILS NFR BLD AUTO: 56 % (ref 42.7–76)
NRBC BLD AUTO-RTO: 0 /100 WBC (ref 0–0.2)
PHOSPHATE SERPL-MCNC: 3.6 MG/DL (ref 2.5–4.5)
PLATELET # BLD AUTO: 218 10*3/MM3 (ref 140–450)
PMV BLD AUTO: 10.6 FL (ref 6–12)
POTASSIUM SERPL-SCNC: 4.6 MMOL/L (ref 3.5–5.2)
PROT SERPL-MCNC: 6.8 G/DL (ref 6–8.5)
RBC # BLD AUTO: 3.75 10*6/MM3 (ref 3.77–5.28)
SODIUM SERPL-SCNC: 143 MMOL/L (ref 136–145)
WBC # BLD AUTO: 3.41 10*3/MM3 (ref 3.4–10.8)

## 2021-09-20 PROCEDURE — 99214 OFFICE O/P EST MOD 30 MIN: CPT | Performed by: NURSE PRACTITIONER

## 2021-09-20 PROCEDURE — 25010000002 DENOSUMAB 120 MG/1.7ML SOLUTION: Performed by: NURSE PRACTITIONER

## 2021-09-20 PROCEDURE — 85025 COMPLETE CBC W/AUTO DIFF WBC: CPT | Performed by: INTERNAL MEDICINE

## 2021-09-20 PROCEDURE — 83735 ASSAY OF MAGNESIUM: CPT | Performed by: INTERNAL MEDICINE

## 2021-09-20 PROCEDURE — 80053 COMPREHEN METABOLIC PANEL: CPT | Performed by: INTERNAL MEDICINE

## 2021-09-20 PROCEDURE — 96372 THER/PROPH/DIAG INJ SC/IM: CPT

## 2021-09-20 PROCEDURE — 36415 COLL VENOUS BLD VENIPUNCTURE: CPT

## 2021-09-20 PROCEDURE — 84100 ASSAY OF PHOSPHORUS: CPT | Performed by: INTERNAL MEDICINE

## 2021-09-20 RX ORDER — METAXALONE 800 MG/1
800 TABLET ORAL 3 TIMES DAILY PRN
Qty: 30 TABLET | Refills: 1 | Status: SHIPPED | OUTPATIENT
Start: 2021-09-20

## 2021-09-20 RX ORDER — AMOXICILLIN 500 MG/1
CAPSULE ORAL
COMMUNITY
Start: 2021-08-09 | End: 2021-10-18

## 2021-09-20 RX ADMIN — DENOSUMAB 120 MG: 120 INJECTION SUBCUTANEOUS at 13:16

## 2021-09-20 NOTE — PATIENT INSTRUCTIONS
1. Take Pepcid (Famotidine) 20 mg daily (can take in the early evening maybe before dinner) to help with nighttime reflux symptoms.

## 2021-09-20 NOTE — NURSING NOTE
Arrived ambulatory for prolia injection. Indication and side effects reviewed. Labs and medications verified. Xgeva administered in left arm without incidence. Instructed to call for any concerns or questions and instructed on how to schedule future appts.  Pt vu and discharged ambulatory.

## 2021-09-20 NOTE — PROGRESS NOTES
Chief Complaint  Previous Stage Ib ([id number]) ER/WA positive, HER-2/peter negative right breast cancer with subsequent metastatic disease identified 10/8/2017, history of right pulmonary embolism, cancer related pain, chemotherapy-induced diarrhea, chemotherapy-induced mucositis, chemotherapy-induced hand-foot syndrome    Subjective        History of Present Illness  The patient returns today in follow-up continuing on oral Xeloda 1000 mg in the morning, 1500 mg in the evening for 7 days on followed by 7 days off as well as monthly Xgeva.  She is tolerating therapy fairly well.  She continues with stable palmar plantar erythroderma, continuing frequent use of emollient creams.  She does experience intermittent cracking and peeling but notes symptoms are tolerable.    Patient has had more difficulty with what sounds to be reflux symptoms recently.  She admits that if she eats later, for example after 8 PM, she experiences a metallic taste in her mouth, coughing throughout the night and sternal discomfort.  She did not seem to understand that this was reflux and was worried the cough might mean something else.  We reviewed her recent imaging that showed questionable infectious process however when seen 1 month ago she reported her cough was getting better and therefore we did not pursue any antibiotic therapy.  We discussed this again today she admits that the cough waxes and wanes and it does truly sound to be more reflux related.  She currently takes omeprazole 40 mg in the morning.  We previously tried her on Carafate as far back as a year ago but she states she did not like it and stopped that.  We discussed adding Pepcid in the early evening to perhaps be an adjunct to the omeprazole.  We also discussed the importance of not eating too late in the evening to avoid the symptoms she describes.    Patient denies other concerns at this time.    Objective   Vital Signs:   /70   Pulse 78   Temp 97.5 °F (36.4  "°C) (Skin)   Resp 16   Ht 156.2 cm (61.5\")   Wt 84.8 kg (187 lb)   SpO2 96%   BMI 34.77 kg/m²     Physical Exam  Constitutional:       Appearance: She is well-developed.   Eyes:      Conjunctiva/sclera: Conjunctivae normal.   Neck:      Thyroid: No thyromegaly.   Cardiovascular:      Rate and Rhythm: Normal rate and regular rhythm.      Heart sounds: No murmur heard.   No friction rub. No gallop.    Pulmonary:      Effort: No respiratory distress.      Breath sounds: Normal breath sounds.   Abdominal:      General: Bowel sounds are normal. There is no distension.      Palpations: Abdomen is soft.      Tenderness: There is no abdominal tenderness.   Lymphadenopathy:      Head:      Right side of head: No submandibular adenopathy.      Cervical: No cervical adenopathy.      Upper Body:      Right upper body: No supraclavicular adenopathy.      Left upper body: No supraclavicular adenopathy.   Skin:     General: Skin is warm and dry.      Findings: Rash present.      Comments: The degree of palmar erythema extending onto the dorsal aspect of the hands has decreased.  There has been an slight increase in the degree of skin cracking.  Sclerodactyly remains however finger movement has improved.   Neurological:      Mental Status: She is alert and oriented to person, place, and time.      Cranial Nerves: No cranial nerve deficit.      Motor: No abnormal muscle tone.      Deep Tendon Reflexes: Reflexes normal.   Psychiatric:         Behavior: Behavior normal.       I have reexamined the patient and the results are consistent with the previously documented exam. She Sears, APRN        Result Review :   Results from last 7 days   Lab Units 09/20/21  1202   WBC 10*3/mm3 3.41   NEUTROS ABS 10*3/mm3 1.91   HEMOGLOBIN g/dL 12.0   HEMATOCRIT % 37.5   PLATELETS 10*3/mm3 218     Results from last 7 days   Lab Units 09/20/21  1202   SODIUM mmol/L 143   POTASSIUM mmol/L 4.6   CHLORIDE mmol/L 105   CO2 mmol/L 29.8* "   BUN mg/dL 19   CREATININE mg/dL 0.85   CALCIUM mg/dL 9.0   ALBUMIN g/dL 4.10   BILIRUBIN mg/dL 0.4   ALK PHOS U/L 71   ALT (SGPT) U/L 20   AST (SGOT) U/L 37*   GLUCOSE mg/dL 111*   MAGNESIUM mg/dL 2.1                  Assessment and Plan     1. Previous Stage Ib ([id number]) right breast cancer:  · Diagnosed May 2010 with excisional biopsy for invasive ductal carcinoma, 1.3 cm, grade 2, ER 90%, VT 80%, HER-2/peter negative (1+ IHC).    · Subsequent right mastectomy in July 2010 with no residual breast malignancy, 1/5 sentinel lymph node with micrometastasis (0.25 mm).    · Treated in the [facility] system with adjuvant AC ×4 cycles in 2010 (no taxanes administered due to underlying Charcot-Saloni-Tooth with peripheral neuropathy).    · Adjuvant Femara (postmenopausal) initiated October 2010 with plan to treat ×10 years.    · Genetic testing reportedly negative.    · Developed osteopenia treated with Prolia beginning 2/27/13. Subsequently discontinued due to identification of metastatic disease.  2. Recurrent/metastatic disease identified 10/8/17:  · Disease involving thoracic spine with cord compression at T6, lumbosacral involvement, sternal and right sternoclavicular involvement.    · Femara discontinued in 10/2017.    · Radiation administered (in the [facility] system) to the thoracic spine beginning 10/19/17 treating T3-T9 to a dose of 24 gray in 6 fractions.  · Evaluation with MRI 12/8/17 showing persistent T6 cord compression with persistent neurologic compromise requiring surgical treatment 12/11/17 with T6 laminectomy/corpectomy and T3-T9 fusion.  Pathology with metastatic carcinoma of breast origin, ER negative, VT negative, HER-2/peter negative (1+ IHC).  · Additional staging evaluation 12/8/17 with no evidence of visceral metastatic disease, bone scan showing involvement of thoracic spine, sternum, left humerus, mid frontal bone.  No plane film correlate of left humerus lesion.  MRI lumbar spine with small  intradural L3 metastasis.  CA 15-3 12/6/17- 17.  · Palliative radiation therapy to L3 dural metastasis and left humerus initiated 1/15/18 (10 fractions), completed 1/26/18.  · Hypercalcemia of malignancy with calcium in the 10-11 range.  · Initiation of monthly Xgeva 1/23/18.  · Baseline CT scan 1/30/18 with no evidence of visceral involvement.  Cluster of nodular opacities in the right lower lobe suspected to be infectious or related to bronchiolitis. Bone scan 1/30/18 showed postsurgical change in the thoracic spine, stable uptake in the frontal bone, no new areas of disease.  · Initiation of palliative oral single agent Xeloda 2/7/18 2000 mg a.m., 1500 mg p.m. for 14/21 days.   · Following 3 cycles xeloda, bone scan 4/4/18 showed no change from the prior study.  CT scan 4/4/18 showed a small pericardial effusion of unclear significance as well as a subcutaneous nodule in the right lateral chest wall.  Subsequent evaluation with echocardiogram 4/17/18 showed no evidence of pericardial effusion.  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  We discussed the possibility of surgical excision of the right subcutaneous chest wall lesion for more definitive diagnosis.  Reviewed previous CT images dating back to 12/8/17 and the lesion was present even at that time measuring around 1.7 cm although not commented on in the radiology report.  As this appears to be an indolent low-grade process unrelated to her breast cancer, recommendee foregoing surgical excision at this time and monitoring this area on future scans.  The patient agreed.    · Following 6 cycles of Xeloda, CT 6/6/18  showed stable findings, no evidence of progressive disease.  There was a comment regarding subcutaneous abnormality in the anterior abdominal wall and this was related to Lovenox injection sites.  Bone  scan 6/6/18 showed no interval change.   · CT scan and bone scan 8/13/18 following 9 cycles of Xeloda showed stable findings with no evidence of significant visceral metastases.  Her bone lesions appear stable on bone scan.  The spindle cell neoplasm in her right chest wall actually decreased in size from 2 cm down to 1.6 cm.    · The patient experienced some symptoms of diarrhea, anorexia, generalized weakness during cycle 9 Xeloda it was unclear whether this was related to a viral gastroenteritis or toxicity from treatment.  Symptoms recurred during cycle 10 and treatment was cut short by 2 days.  Symptoms attributed to Xeloda.  With cycle 11, dose and schedule altered to 1500 mg twice daily for 7 days on followed by 7 days off .  · CT scan 9/9/2020 with no significant changes.  Bone scan 9/9/2020 read as unchanged from prior studies however did note an area of slight activity in the medial left femur.  Contacted radiology and although this was not noted on prior reports appears to have been present.  Subsequent MRI left femur 9/21/2020 with cortical thickening and periosteal edema left iliac us muscle insertion to the medial left femur with no evidence of metastatic disease, favored to represent periosteal change secondary to insertional tendinitis.  · Severe hand-foot symptoms causing sclerodactyly and limitation in finger movement prompted change in dosing in July 2021 with Xeloda decreased to 1000 mg a.m., 1500 mg p.m. for 7 days on followed by 7 days off.  · Most recent 3-month interval scans from 8/10/2021 with CT chest abdomen pelvis, bone scan performed.  CT scans showed no change in pulmonary nodules and sclerotic bone metastases.  There was new area of tree-in-bud nodularity right lower lobe felt to be infectious/inflammatory.  The right lateral chest wall lesion decreased from 2.09 1.6 cm.  Diffuse bladder wall thickening noted with concern for cystitis.  Bone scan showed stable disease.  · The patient  returns today continuing on treatment with Xeloda 1000 mg a.m., 1500 mg p.m. 7 days on followed by 7 days off.  She is due to start a new week of Xeloda today.  She is due today for monthly Xgeva.  She continues currently with stable erythroderma of the hands and feet with continued intermittent cracking and peeling and some decreased movement of the fingers but overall improved after we delayed treatment last month briefly.  Patient states her side effects are manageable/tolerable.  We are planning CT and bone scan at 3-month interval follow-up.    3. Right pulmonary embolism:  · Diagnosed on CT angiogram 10/21/17 involving small right lower lobe pulmonary artery.  Lower extremity Dopplers negative.  · Bilateral lower extremity Dopplers negative again 12/5/17.  · Received chronic Lovenox 1 mg/kg twice per day, transition to oral Eliquis in February 2019, continuing on Eliquis 5 mg twice daily.  4. Cancer related pain:  · Previously receiving Duragesic 50 µg patch every 72 hours along with Dilaudid 4 mg as needed for breakthrough pain  · The patient's pain improved over time and she was able to discontinue both Duragesic and Dilaudid in the interval.  · Patient does take occasional Flexeril at bedtime due to back spasm/pain when she has been more active.  · The patient does have some occasional aggravation of her chronic back pain and does use tramadol 50 mg every 8 hours as needed  · Patient's pain is stable.  She does continue to use tramadol 50 mg every 12 hours as needed which has been effective.  She has experienced an increase in her chronic back pain recently and is going to be starting in on a course of physical therapy soon.  5. Chemotherapy-induced diarrhea with subsequent C. difficile colitis in the setting of previous ulcerative colitis:  · Patient with reported history of ulcerative colitis, is not on active therapy.  · The patient developed initial diarrhea related to Xeloda at regular  dosing.  · Symptoms improved on reduced dose Xeloda  · Flare of symptoms in October 2018 with apparent finding of C. difficile colitis by GI, treated with course of oral vancomycin with improvement in symptoms.  · Patient notes minimal intermittent diarrhea/loose stools on Xeloda requiring occasional dosing of Imodium.  She reports that her bowel function recently has been fairly stable.  6. Traumatic left tibia/fibular fracture:  · Status post ORIF 12/6/17  · Specimen was sent for pathologic review, negative for evidence of malignancy  7. Hypercalcemia:  · Suspect hypercalcemia of malignancy, calcium in  10-11 range previously.  · Calcium normalized following initiation of monthly Xgeva on 1/23/18.  8. Chemotherapy-induced mucositis:  · Patient had a minimal degree of mucositis with cycle 2.  The patient has magic mouthwash to use as needed.  No subsequent mucositis.  9. Recurrent UTI, bladder wall thickening on CT:  · Patient had an enterococcal UTI on 3/2/18 sensitive to nitrofurantoin and received treatment, unclear how long.  · Recurrent UTI 3/20/18 with urine culture growing Klebsiella, initially treated with nitrofurantoin, transitioned to Levaquin.  · CT 4/4/18 with diffuse bladder wall thickening with increased nodular thickening at the left base.  Referral to urogynecology Dr. May Johnson.  She was placed on a prophylactic dose of trimethoprim 100 mg daily, bladder wall thickening felt to be related to recent recurrent urinary tract infections.  · Patient with urinary symptoms, treated with course of Macrobid at the end of December 2018, urine culture however was negative 12/31/18.  · Patient was found to have Klebsiella UTI 7/29/2019 which was successfully treated with Macrobid with complete resolution of symptoms.  · Current CT 8/10/2021 with diffuse bladder wall thickening new from 5/17/2021 (however seen on multiple prior scans).  Patient with an increase in dysuria recently.  Urinalysis was obtained  and patient treated with a course of Macrobid.  We also gave her Pyridium to use as needed.  She was referred back to urogynecology Dr. Cheryl Johnson whom she will see next week.  10.   Mobility:  · The patient underwent an intensive course of rehabilitation at [facility].  She graduated from her outpatient course November 2018.  · Overall the patient has improved dramatically in terms of mobility, now walking around 1 mile per day without assistance.  11.  Depression:  · The patient is continuing follow-up in the supportive oncology clinic and is currently continuing on Cymbalta 30 mg twice daily as well as gabapentin 300 mg twice daily.  · The patient feels that her depression symptoms are currently fairly well controlled.  She has seen some benefit from attending support groups at [facility] which she plans to continue.  · The patient does continue follow-up in supportive oncology clinic.  12. 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  Symptoms did improve and she continued on the same dose.    · Patient was referred to dermatology and has been continuing on triamcinolone 0.1% cream used for 1 week on followed by 1 week off which led to some further improvement.   · Progressive palmar erythema with development of sclerodactyly and effect on dexterity.  Addition of urea-based cream 3 times daily.  · Patient with continued difficulty regarding erythema of her hands that extended onto the dorsal aspect and was causing contractures/sclerodactyly in her fingers affecting her dexterity.  In July 2021, held Xeloda for an additional week and then reduced dose from 1500 mg twice daily down to 1000 mg a.m. and 1500 mg p.m. and continued on a 7-day on followed by 7-day off schedule.  · Symptoms have improved/stabilized per above.  No change in  current therapy warranted.    13. Evidence of steatosis on scans with mild intermittent elevated liver function studies:  · Liver function studies increased 8/20/19 with ALT 98, AST 70, normal total bilirubin.  · Negative viral hepatitis A, B, and C panel 8/23/18  · Likely related to hepatic steatosis.   · Subsequent improvement in LFTs  · Today, LFTs normal  14. Chemotherapy induced leukopenia:  · WBC today  6.26 with normal differential  15. GERD:  · The patient continues on omeprazole 40 mg daily (2 x 20 mg).  · Patient reports stopping Carafate long ago and therefore on no other intervention besides omeprazole.    · Patient reporting if she eats after around 8 PM she usually develops cough, external burning and metallic taste in her mouth lasting into the morning.  Patient concerned the cough was infectious and we discussed her symptoms all sound like poorly controlled reflux.  Discussed adding famotidine in addition to continuing omeprazole and also not eating late at night to avoid the symptoms.  She will monitor and let us know if worsening.    16. Insomnia:  · Patient with prior paradoxical reaction to Benadryl  · Improved previously on temazepam 15 mg nightly as needed.   · Patient noted subsequently that temazepam was having no effect.   · Symptoms currently stable on gabapentin 300 mg in the evening and 300 mg at night  17. Health maintenance:  · Patient notes that she has a history of colon polyps as well as ulcerative colitis and was due for a follow-up colonoscopy on 9/12/2019.  We did discuss there is no necessity to pursue colonoscopy in the setting of her metastatic breast cancer.    · The patient did undergo colonoscopy on 2/7/2020 with findings of muscular hypertrophy and diverticulosis.   18. Right shoulder pain:  · Patient was evaluated by Dr Forrester.  She has experienced difficulty over a long time frame with right shoulder although she has not complained of this in prior visits to our  office.  She reported difficulty with abduction.    · The patient did undergo MRI of her right shoulder on 11/21/2019 at an outside facility showing multiple abnormalities including supraspinatus tendinosis, labral tear but no evidence of metastatic disease.  · Patient developed pain recently in the left shoulder, was seen by orthopedics and underwent steroid injection last week which has led to some improvement.  Right shoulder is stable currently  19. Ocular changes in part related to Xeloda:  · Patient experienced a mild degree of blurred vision as well as burning and pruritus.  · She was seen by her ophthalmologist and has been continuing on xiidra ophthalmic drops which have helped.  · Likely both issues are to some extent related to Xeloda.  · Symptoms currently stable to improved.  20. Elevated glucose:  · It is noted the patient's glucose at the last few visits has been in the high 100 range, postprandial.  · She has had a hemoglobin A1c that has been in the high 5-6 range in the past, on 5/1/2019 at 5.7  · Hemoglobin A1c on 5/7/2021 was improved at 5.2  21. Possible loosening of pedicle screw at T3:  · CT cervical spine 5/17/2021 showed loosening of the left pedicle screw at T3.  Patient referred to orthopedics and was seen by Dr. Nayak who felt that there were no concerning findings on the scan  22. COVID-19 vaccination  · Patient received the Pfizer vaccine, second dose administered on 4/2/2021 without side effects.  · Patient reports getting third booster vaccination mid-September 2021 .     Plan:  1. Continue Xeloda 1000 mg a.m., 1500 mg in the p.m. 7 days on followed by 7 days off (starting a new week of Xeloda today).  2. Monthly Xgeva today  3. Continue Eliquis 5 mg twice daily.  4. Continue use of emollient cream and urea-based cream on the hands and feet and continue to wear socks and gloves at night as well as use of triamcinolone cream as prescribed by dermatology.  5. Continue omeprazole 40 mg  daily (patient reports stopping Carafate long ago because she did not like it)  6. Add famotidine 20 mg in the early evening.  Monitor reflux symptoms.   7. Patient cautioned against eating food late at night as it seems to be exacerbating her reflux per above.    8. Continue xiidra ophthalmic drops prescribed by ophthalmology  9. Continue Provigil 200 mg daily per supportive oncology clinic.  Patient continues routine follow-up with supportive oncology.  10. Patient has follow-up with urogynecology Dr. Cheryl Johnson next week.  This is specifically in regards to recent CT findings suggesting cystitis along with recurrence of dysuria and low back pain  11. Patient continuing with a course of physical therapy for low back pain soon  12. In 4 weeks MD visit with CBC, CMP, magnesium, phosphorus and Xgeva  13. In 7 weeks CT chest abdomen pelvis and bone scan  14. MD visit in 8 weeks with CBC, CMP, magnesium, phosphorus and Xgeva.     Patient continuing on high risk medication requiring intensive monitoring.

## 2021-09-22 ENCOUNTER — TREATMENT (OUTPATIENT)
Dept: PHYSICAL THERAPY | Facility: CLINIC | Age: 61
End: 2021-09-22

## 2021-09-22 DIAGNOSIS — G89.29 CHRONIC BILATERAL LOW BACK PAIN WITHOUT SCIATICA: ICD-10-CM

## 2021-09-22 DIAGNOSIS — M25.512 CHRONIC LEFT SHOULDER PAIN: ICD-10-CM

## 2021-09-22 DIAGNOSIS — M54.50 CHRONIC BILATERAL LOW BACK PAIN WITHOUT SCIATICA: ICD-10-CM

## 2021-09-22 DIAGNOSIS — M54.2 CERVICAL SPINE PAIN: ICD-10-CM

## 2021-09-22 DIAGNOSIS — Z74.09 IMPAIRED MOBILITY: ICD-10-CM

## 2021-09-22 DIAGNOSIS — M54.6 THORACIC SPINE PAIN: Primary | ICD-10-CM

## 2021-09-22 DIAGNOSIS — G89.29 CHRONIC LEFT SHOULDER PAIN: ICD-10-CM

## 2021-09-22 PROCEDURE — 97530 THERAPEUTIC ACTIVITIES: CPT | Performed by: PHYSICAL THERAPIST

## 2021-09-22 PROCEDURE — 97110 THERAPEUTIC EXERCISES: CPT | Performed by: PHYSICAL THERAPIST

## 2021-09-22 PROCEDURE — 97140 MANUAL THERAPY 1/> REGIONS: CPT | Performed by: PHYSICAL THERAPIST

## 2021-09-22 NOTE — PROGRESS NOTES
Physical Therapy Daily Progress Note    Patient: Martha Davey   : 1960  Diagnosis/ICD-10 Code:  Thoracic spine pain [M54.6]  Referring practitioner: Quan Nayak MD  Date of Initial Visit: Type: THERAPY  Noted: 9/3/2021  Today's Date: 2021  Patient seen for 3 sessions         Martha Davey reports: neck and shoulders are hurting today, especially when I drive.    Subjective     Objective   See Exercise, Manual, and Modality Logs for complete treatment.       Assessment/Plan  Subjectively, pt reports no increase of pain or discomfort with interventions performed today. Performed well with exercise focused on cervical and shoulder mobility and beginning strengthening. Continues to demonstrate multiple trigger points and tenderness in cervical and UT musculature. Positive response to manual therapy in regards to decrease pain/discomfort and improved subjective mobility. Continues to benefit from verbal/tactile cues to ensure proper form and technique for exercise performance.     Progress per Plan of Care           Manual Therapy:    18     mins  09578;  Therapeutic Exercise:    12     mins  69426;     Neuromuscular Chanel:        mins  20729;    Therapeutic Activity:     10     mins  11480;     Gait Training:           mins  22521;     Ultrasound:          mins  42705;    Electrical Stimulation:         mins  60190 ( );  Dry Needling          mins self-pay    Timed Treatment:  40    mins   Total Treatment:     40   mins    Odilia Marshall PTA  Physical Therapist Assistant [id number]

## 2021-09-24 NOTE — PROGRESS NOTES
Re-Assessment       Patient: Martha Davey   : 1960  Diagnosis/ICD-10 Code:  Thoracic spine pain [M54.6]  Referring practitioner: Quan Nayak MD  Date of Initial Visit: Type: THERAPY  Noted: 9/3/2021  Today's Date: 2021  Patient seen for 4 sessions      Subjective:   Subjective Questionnaire: NDI:15/50 - 30% perceived disability  Clinical Progress: improved  Home Program Compliance: Yes  Treatment has included: therapeutic exercise, manual therapy, therapeutic activity and moist heat    Subjective Evaluation    History of Present Illness  Mechanism of injury: Pain is increased when looking down at her desk.  Pain persists down the L arm that is constant and I am stiff having difficulty reaching/lifting my arm.  Pt report that her neck has improved since starting therapy.      Pain  Current pain ratin  At worst pain ratin         Objective          Active Range of Motion   Cervical/Thoracic Spine   Cervical    Flexion: 45 degrees with pain  Extension: 40 degrees   Left lateral flexion: 30 degrees with pain  Right lateral flexion: 20 degrees with pain  Left rotation: 68 degrees   Right rotation: 80 degrees   Left Shoulder   Flexion: 90 degrees   Abduction: 78 degrees   External rotation 45°: 15 degrees   Internal rotation 45°: 50 degrees     Passive Range of Motion   Left Shoulder   Flexion: 124 degrees   Abduction: 88 degrees     Strength/Myotome Testing     Left Shoulder     Planes of Motion   Abduction: 4     Right Shoulder     Planes of Motion   Abduction: 3   External rotation at 0°: 3+ (pain)   Internal rotation at 0°: 4 (pain)     Left Elbow   Flexion: 5  Extension: 5    Right Elbow   Flexion: 5  Extension: 5    Left Wrist/Hand   Wrist extension: 5  Wrist flexion: 5    Right Wrist/Hand   Wrist extension: 5  Wrist flexion: 5      Assessment/Plan   Pt continues to exhibit cervical pain with LUE radiation with forward and sidebending.  L shoulder ROM remains restricted in capsular  pattern consistent with traumatic adhesive capsulitis.  Exercises were advanced to address ROM deficits and pt was given handout for compliance/recall. Pt would benefit from a continued course of skilled PT services in order to address listed impairments and increase tolerance to normal daily activities.     Progress toward previous goals: Partially Met    Goals  Short Term: 4 weeks  1. The patient will be compliant with home exercise program, requiring minimal verbal and or tactile cues to perform. - MET  2. The patient will improve left shoulder active scaption range of motion to 115 degrees in order to perform overhead reaching activities for house work. - UNMET  3. The patient will report neck pain <4/10 with reading, keyboarding and driving. - UNMET    Long Term: 8 weeks   1. The patient will be independent with discharge home exercise program. - UNMET  2. The patient will improve left shoulder active scaption range of motion to 140 degrees in order to perform overhead reaching activities for house work. -UNMET  3. The patient will report neck pain <2/10 with reading, keyboarding and driving. - UNMET  4. The patient will improve NDI score from 20/45 (omit work question) to 11/45 in order to return to driving and reading with lessened restriction. - progressing     Recommendations: Continue as planned  Timeframe: 6 weeks  Prognosis to achieve goals: good    PT Signature: Annabel Ly, PT  [place] License # [id number]    Based upon review of the patient's progress and continued therapy plan, it is my medical opinion that Martha Davey should continue physical therapy treatment at [facility]  [address] [place] [id number] [phone].    Signature: __________________________________  Quan Nayak MD    Timed:  Manual Therapy:    10     mins  34584;  Therapeutic Exercise:    25     mins  71548;     Neuromuscular Chanel:    -    mins  [id number];     Therapeutic Activity:     10     mins  83935;     Gait Training:      -     mins  13213;     Ultrasound:     -     mins  39103;    Iontophoresis                 -     mins 80213      Timed Treatment:   45   mins   Total Treatment:     55   mins

## 2021-09-27 ENCOUNTER — TREATMENT (OUTPATIENT)
Dept: PHYSICAL THERAPY | Facility: CLINIC | Age: 61
End: 2021-09-27

## 2021-09-27 DIAGNOSIS — G89.29 CHRONIC BILATERAL LOW BACK PAIN WITHOUT SCIATICA: ICD-10-CM

## 2021-09-27 DIAGNOSIS — Z74.09 IMPAIRED MOBILITY: ICD-10-CM

## 2021-09-27 DIAGNOSIS — M54.2 CERVICAL SPINE PAIN: ICD-10-CM

## 2021-09-27 DIAGNOSIS — M25.512 CHRONIC LEFT SHOULDER PAIN: ICD-10-CM

## 2021-09-27 DIAGNOSIS — M54.6 THORACIC SPINE PAIN: Primary | ICD-10-CM

## 2021-09-27 DIAGNOSIS — G89.29 CHRONIC LEFT SHOULDER PAIN: ICD-10-CM

## 2021-09-27 DIAGNOSIS — M54.50 CHRONIC BILATERAL LOW BACK PAIN WITHOUT SCIATICA: ICD-10-CM

## 2021-09-27 PROCEDURE — 97140 MANUAL THERAPY 1/> REGIONS: CPT | Performed by: PHYSICAL THERAPIST

## 2021-09-27 PROCEDURE — 97110 THERAPEUTIC EXERCISES: CPT | Performed by: PHYSICAL THERAPIST

## 2021-09-27 PROCEDURE — 97530 THERAPEUTIC ACTIVITIES: CPT | Performed by: PHYSICAL THERAPIST

## 2021-09-28 DIAGNOSIS — F33.1 MAJOR DEPRESSIVE DISORDER, RECURRENT EPISODE, MODERATE (HCC): ICD-10-CM

## 2021-09-28 DIAGNOSIS — G47.33 OSA (OBSTRUCTIVE SLEEP APNEA): ICD-10-CM

## 2021-09-28 DIAGNOSIS — R53.0 NEOPLASTIC MALIGNANT RELATED FATIGUE: ICD-10-CM

## 2021-09-28 RX ORDER — MODAFINIL 200 MG/1
TABLET ORAL
Qty: 30 TABLET | Refills: 0 | Status: SHIPPED | OUTPATIENT
Start: 2021-09-28 | End: 2021-12-03

## 2021-09-28 RX ORDER — TRAMADOL HYDROCHLORIDE 50 MG/1
TABLET ORAL
Qty: 90 TABLET | Refills: 1 | Status: SHIPPED | OUTPATIENT
Start: 2021-09-28 | End: 2021-12-21 | Stop reason: SDUPTHER

## 2021-10-11 ENCOUNTER — TELEPHONE (OUTPATIENT)
Dept: PHARMACY | Facility: HOSPITAL | Age: 61
End: 2021-10-11

## 2021-10-11 NOTE — TELEPHONE ENCOUNTER
[facility] Refill Outreach    Called regarding refill of medication: Schuyler  Spoke with patient.    Assessment  • It looks like it is almost time for your refill, how many doses do you have left? About a week  • How are you doing on the medication, are you experiencing any side effects? Patient denies side effects   • How many doses have you missed since you last filled your prescription? 0 doses missed.  • Have you stopped or started any medications since we last spoke? Patient has stopped: Dry-Eye eye drops. Pharmacist will be made aware of medication changes.     Shipment   • We can go ahead and coordinate your next refill, would you like to pick this up at the pharmacy/curbside, or have this delivered to you vs. the clinic?   o If government plan (Medicaid and Medicare B), delivery will require signature -> identify a day the patient will be home for delivery set up (note the package should arrive prior to noon as pharmacy will ship priority overnight)  • Patient was advised medication will be shipped via [employer] (priority, signature required) on 10/14/2021 with expected delivery on 10/15/2021. Shipping comments patient is requesting of [employer] : NONE. .   • Shipping address confirmed: [address] [place] [address]  • Patient is aware and willing to pay copay of $0.   • Do you have any questions for the pharmacist? No.  • Ensured patient has clinic contact information for questions.     Yolanda Meyers, Pharmacy Technician  10/11/2021  13:10 EDT

## 2021-10-15 NOTE — PROGRESS NOTES
Chief Complaint  Previous Stage Ib ([id number]) ER/NE positive, HER-2/peter negative right breast cancer with subsequent metastatic disease identified 10/8/2017, history of right pulmonary embolism, cancer related pain, chemotherapy-induced diarrhea, chemotherapy-induced mucositis, chemotherapy-induced hand-foot syndrome    Subjective        History of Present Illness  The patient returns today in follow-up continuing on oral Xeloda 1000 mg in the morning, 1500 mg in the evening for 7 days on followed by 7 days off as well as monthly Xgeva and Eliquis 5 mg twice daily.  Patient returns today in follow-up due for Xgeva.  She reports that overall she has been fairly stable.  Her hand-foot symptoms have been stable and she feels are at an acceptable standpoint in regards of quality of life and functional ability particularly with her hands.  She is using emollient and urea-based cream frequently.  She had further difficulty with dry eyes and was seen by a new ophthalmologist, is using a lubricant gel at night for the past 3 weeks which has helped.  She also uses artificial tears.  She was experiencing worsening reflux symptoms and is continuing on omeprazole 40 mg in the morning and we did add in Pepcid 20 mg in the evening which has helped.  She does occasionally eat later at night which exacerbates her symptoms and she is well aware of this as a cause.  She does have a intermittent chronic cough which is unchanged.  She denies any dyspnea on exertion.  She does have some chronic pain issues involving her low back, neck, left shoulder and is continuing to follow-up with physical therapy.  She does note recurrent symptoms of urinary burning and frequency again and is concerned she may have a recurrent UTI.  She was seen by urogynecology recently, seen by the nurse practitioner and felt that her issues were not addressed at that visit and is requesting to see Dr. Johnson again in follow-up.  She reports normal appetite,  "weight is stable at 187 pounds.      Objective   Vital Signs:   /90   Pulse 84   Temp 98 °F (36.7 °C) (Temporal)   Resp 18   Ht 156.2 cm (61.5\")   Wt 85 kg (187 lb 4.8 oz)   SpO2 93%   BMI 34.82 kg/m²     Physical Exam  Constitutional:       Appearance: She is well-developed.   Eyes:      Conjunctiva/sclera: Conjunctivae normal.   Neck:      Thyroid: No thyromegaly.   Cardiovascular:      Rate and Rhythm: Normal rate and regular rhythm.      Heart sounds: No murmur heard.  No friction rub. No gallop.    Pulmonary:      Effort: No respiratory distress.      Breath sounds: Normal breath sounds.   Chest:   Breasts:      Right: No supraclavicular adenopathy.      Left: No supraclavicular adenopathy.       Abdominal:      General: Bowel sounds are normal. There is no distension.      Palpations: Abdomen is soft.      Tenderness: There is no abdominal tenderness.   Lymphadenopathy:      Head:      Right side of head: No submandibular adenopathy.      Cervical: No cervical adenopathy.      Upper Body:      Right upper body: No supraclavicular adenopathy.      Left upper body: No supraclavicular adenopathy.   Skin:     General: Skin is warm and dry.      Findings: Rash present.      Comments: Stable findings involving the hands with palmar erythema with skin cracking and peeling that does extend to a minor extent onto the dorsal aspect of the hands.  There is persistent sclerodactyly however mobility of the fingers has improved.     Neurological:      Mental Status: She is alert and oriented to person, place, and time.      Cranial Nerves: No cranial nerve deficit.      Motor: No abnormal muscle tone.      Deep Tendon Reflexes: Reflexes normal.   Psychiatric:         Behavior: Behavior normal.        Result Review : Reviewed CBC, CMP, magnesium, phosphorus, from today.  Reviewed records from ophthalmology     Assessment and Plan     1. Previous Stage Ib ([id number]) right breast cancer:  · Diagnosed May 2010 " with excisional biopsy for invasive ductal carcinoma, 1.3 cm, grade 2, ER 90%, MO 80%, HER-2/peter negative (1+ IHC).    · Subsequent right mastectomy in July 2010 with no residual breast malignancy, 1/5 sentinel lymph node with micrometastasis (0.25 mm).    · Treated in the [facility] system with adjuvant AC ×4 cycles in 2010 (no taxanes administered due to underlying Charcot-Saloni-Tooth with peripheral neuropathy).    · Adjuvant Femara (postmenopausal) initiated October 2010 with plan to treat ×10 years.    · Genetic testing reportedly negative.    · Developed osteopenia treated with Prolia beginning 2/27/13. Subsequently discontinued due to identification of metastatic disease.  2. Recurrent/metastatic disease identified 10/8/17:  · Disease involving thoracic spine with cord compression at T6, lumbosacral involvement, sternal and right sternoclavicular involvement.    · Femara discontinued in 10/2017.    · Radiation administered (in the [facility] system) to the thoracic spine beginning 10/19/17 treating T3-T9 to a dose of 24 gray in 6 fractions.  · Evaluation with MRI 12/8/17 showing persistent T6 cord compression with persistent neurologic compromise requiring surgical treatment 12/11/17 with T6 laminectomy/corpectomy and T3-T9 fusion.  Pathology with metastatic carcinoma of breast origin, ER negative, MO negative, HER-2/peter negative (1+ IHC).  · Additional staging evaluation 12/8/17 with no evidence of visceral metastatic disease, bone scan showing involvement of thoracic spine, sternum, left humerus, mid frontal bone.  No plane film correlate of left humerus lesion.  MRI lumbar spine with small intradural L3 metastasis.  CA 15-3 12/6/17- 17.  · Palliative radiation therapy to L3 dural metastasis and left humerus initiated 1/15/18 (10 fractions), completed 1/26/18.  · Hypercalcemia of malignancy with calcium in the 10-11 range.  · Initiation of monthly Xgeva 1/23/18.  · Baseline CT scan 1/30/18 with no evidence of  visceral involvement.  Cluster of nodular opacities in the right lower lobe suspected to be infectious or related to bronchiolitis. Bone scan 1/30/18 showed postsurgical change in the thoracic spine, stable uptake in the frontal bone, no new areas of disease.  · Initiation of palliative oral single agent Xeloda 2/7/18 2000 mg a.m., 1500 mg p.m. for 14/21 days.   · Following 3 cycles xeloda, bone scan 4/4/18 showed no change from the prior study.  CT scan 4/4/18 showed a small pericardial effusion of unclear significance as well as a subcutaneous nodule in the right lateral chest wall.  Subsequent evaluation with echocardiogram 4/17/18 showed no evidence of pericardial effusion.  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  We discussed the possibility of surgical excision of the right subcutaneous chest wall lesion for more definitive diagnosis.  Reviewed previous CT images dating back to 12/8/17 and the lesion was present even at that time measuring around 1.7 cm although not commented on in the radiology report.  As this appears to be an indolent low-grade process unrelated to her breast cancer, recommendee foregoing surgical excision at this time and monitoring this area on future scans.  The patient agreed.    · Following 6 cycles of Xeloda, CT 6/6/18  showed stable findings, no evidence of progressive disease.  There was a comment regarding subcutaneous abnormality in the anterior abdominal wall and this was related to Lovenox injection sites.  Bone scan 6/6/18 showed no interval change.   · CT scan and bone scan 8/13/18 following 9 cycles of Xeloda showed stable findings with no evidence of significant visceral metastases.  Her bone lesions appear stable on bone scan.  The spindle cell neoplasm in her right chest wall actually decreased in size from 2 cm down to 1.6  cm.    · The patient experienced some symptoms of diarrhea, anorexia, generalized weakness during cycle 9 Xeloda it was unclear whether this was related to a viral gastroenteritis or toxicity from treatment.  Symptoms recurred during cycle 10 and treatment was cut short by 2 days.  Symptoms attributed to Xeloda.  With cycle 11, dose and schedule altered to 1500 mg twice daily for 7 days on followed by 7 days off .  · CT scan 9/9/2020 with no significant changes.  Bone scan 9/9/2020 read as unchanged from prior studies however did note an area of slight activity in the medial left femur.  Contacted radiology and although this was not noted on prior reports appears to have been present.  Subsequent MRI left femur 9/21/2020 with cortical thickening and periosteal edema left iliac us muscle insertion to the medial left femur with no evidence of metastatic disease, favored to represent periosteal change secondary to insertional tendinitis.  · Severe hand-foot symptoms causing sclerodactyly and limitation in finger movement prompted change in dosing in July 2021 with Xeloda decreased to 1000 mg a.m., 1500 mg p.m. for 7 days on followed by 7 days off.  · Most recent 3-month interval scans from 8/10/2021 with CT chest abdomen pelvis, bone scan performed.  CT scans showed no change in pulmonary nodules and sclerotic bone metastases.  There was new area of tree-in-bud nodularity right lower lobe felt to be infectious/inflammatory.  The right lateral chest wall lesion decreased from 2.09 1.6 cm.  Diffuse bladder wall thickening noted with concern for cystitis.  Bone scan showed stable disease.  · The patient returns today continuing on treatment with Xeloda 1000 mg a.m., 1500 mg p.m. 7 days on followed by 7 days off.  She is due today for monthly Xgeva.  The patient has experienced some chronic side effects from Xeloda, mainly related to hand-foot syndrome and ocular dryness.  Her hand-foot symptoms have improved since we  modified her Xeloda dose, do remain significant however.  She continues to use emollient and urea creams frequently.  She currently has erythema with some minor skin peeling in the hands along with some degree of sclerodactyly.  The sclerodactyly however has improved over time and she does have improved function of her fingers.  She feels that her current symptoms are acceptable.  We also discussed her dry eyes.  She has a new physician in the [facility] system and ophthalmology.  She is using an ocular lubricant at night which has helped significantly and does use artificial tears during the day.  She feels that symptoms are currently tolerable in this regard as well.  She has had continued difficulty with recurrent urinary tract infections and was seen recently by nurse practitioner in the urogynecology office.  She did not feel that her symptoms were addressed at that time and would like to return to see the physician we will try to make arrangements for this.  Again today she has increase in urinary frequency and dysuria and we are checking urinalysis and culture.  She is scheduled for upcoming CT scan and bone scan in 3 weeks prior to her return visit here in 4 weeks.  3. Right pulmonary embolism:  · Diagnosed on CT angiogram 10/21/17 involving small right lower lobe pulmonary artery.  Lower extremity Dopplers negative.  · Bilateral lower extremity Dopplers negative again 12/5/17.  · Received chronic Lovenox 1 mg/kg twice per day, transition to oral Eliquis in February 2019, continuing on Eliquis 5 mg twice daily.  · The patient has had no bleeding difficulties on anticoagulation  4. Cancer related pain:  · Previously receiving Duragesic 50 µg patch every 72 hours along with Dilaudid 4 mg as needed for breakthrough pain  · The patient's pain improved over time and she was able to discontinue both Duragesic and Dilaudid in the interval.  · Patient does take occasional Flexeril at bedtime due to  back spasm/pain when she has been more active.  · The patient does have some occasional aggravation of her chronic back pain and does use tramadol 50 mg every 8 hours as needed  · Patient's pain is stable.  She does continue to use tramadol 50 mg every 12 hours as needed which has been effective.  The patient does have chronic difficulty with pain involving her low back, left shoulder.  Physical therapy has helped to produce some relief.  Pain is stable to improved.  5. Chemotherapy-induced diarrhea with subsequent C. difficile colitis in the setting of previous ulcerative colitis:  · Patient with reported history of ulcerative colitis, is not on active therapy.  · The patient developed initial diarrhea related to Xeloda at regular dosing.  · Symptoms improved on reduced dose Xeloda  · Flare of symptoms in October 2018 with apparent finding of C. difficile colitis by GI, treated with course of oral vancomycin with improvement in symptoms.  · Patient notes minimal intermittent diarrhea/loose stools on Xeloda requiring occasional dosing of Imodium.  She reports that her bowel function recently has been fairly stable.  6. Traumatic left tibia/fibular fracture:  · Status post ORIF 12/6/17  · Specimen was sent for pathologic review, negative for evidence of malignancy  7. Hypercalcemia:  · Suspect hypercalcemia of malignancy, calcium in  10-11 range previously.  · Calcium normalized following initiation of monthly Xgeva on 1/23/18.  8. Chemotherapy-induced mucositis:  · Patient had a minimal degree of mucositis with cycle 2.  The patient has magic mouthwash to use as needed.  No subsequent mucositis.  9. Recurrent UTI, bladder wall thickening on CT:  · Patient had an enterococcal UTI on 3/2/18 sensitive to nitrofurantoin and received treatment, unclear how long.  · Recurrent UTI 3/20/18 with urine culture growing Klebsiella, initially treated with nitrofurantoin, transitioned to Levaquin.  · CT 4/4/18 with diffuse  bladder wall thickening with increased nodular thickening at the left base.  Referral to urogynecology Dr. May Johnson.  She was placed on a prophylactic dose of trimethoprim 100 mg daily, bladder wall thickening felt to be related to recent recurrent urinary tract infections.  · Patient with urinary symptoms, treated with course of Macrobid at the end of December 2018, urine culture however was negative 12/31/18.  · Patient was found to have Klebsiella UTI 7/29/2019 which was successfully treated with Macrobid with complete resolution of symptoms.  · CT 8/10/2021 with diffuse bladder wall thickening new from 5/17/2021 (however seen on multiple prior scans).    · Patient has continued with recurrent recent urinary tract infections.  Her last culture on 9/9/2021 grew greater than 100,000 colonies Enterobacter that was resistant to Bactrim and cefazolin.  She has recurrent dysuria and urinary frequency today and we are checking urinalysis and urine culture and will notify her if additional antibiotic therapy is needed.  She is continuing on Pyridium 200 mg 3 times daily as needed.  She would like to return to see Dr. Johnson in urogynecology and we will try to make arrangements.  10.   Mobility:  · The patient underwent an intensive course of rehabilitation at [facility].  She graduated from her outpatient course November 2018.  · Overall the patient has improved dramatically in terms of mobility, now walking around 1 mile per day without assistance.  11.  Depression:  · The patient is continuing follow-up in the supportive oncology clinic and is currently continuing on Cymbalta 30 mg twice daily as well as gabapentin 300 mg twice daily.  · The patient feels that her depression symptoms are currently fairly well controlled.  She has seen some benefit from attending support groups at [facility] which she plans to continue.  · The patient does continue follow-up in supportive oncology clinic.  12. Hand-foot syndrome  secondary to Xeloda:  · Patient continues with frequent application of emollient cream to the hands and feet  · Symptoms increased significantly requiring a 1 week delay in cycle 18 Xeloda as noted above.  Symptoms did improve and she continued on the same dose.    · Patient was referred to dermatology and has been continuing on triamcinolone 0.1% cream used for 1 week on followed by 1 week off which led to some further improvement.   · Progressive palmar erythema with development of sclerodactyly and effect on dexterity.  Addition of urea-based cream 3 times daily.  · Patient with continued difficulty regarding erythema of her hands that extended onto the dorsal aspect and was causing contractures/sclerodactyly in her fingers affecting her dexterity.  In July 2021, held Xeloda for an additional week and then reduced dose from 1500 mg twice daily down to 1000 mg a.m. and 1500 mg p.m. and continued on a 7-day on followed by 7-day off schedule.  · With reduction in Xeloda dose, slight improvement in erythema involving dorsal aspect of the hands and slight decrease in sclerodactyly  · Patient returns today in follow-up with stable findings involving the hands with palmar erythema, some skin peeling, extension of erythema to the dorsal aspect of the hands to a minor extent.  This degree of sclerodactyly overall improved after Xeloda dose reduction and is stable today.  Patient reports that her dexterity and level of functioning is acceptable.  Patient was encouraged to continue frequent use of emollient and urea-based creams.  13. Evidence of steatosis on scans with mild intermittent elevated liver function studies:  · Liver function studies increased 8/20/19 with ALT 98, AST 70, normal total bilirubin.  · Negative viral hepatitis A, B, and C panel 8/23/18  · Likely related to hepatic steatosis.   · Subsequent improvement in LFTs  · Today, LFTs normal  14. Chemotherapy induced leukopenia:  · WBC today   7.77  15. GERD:  · The patient continues on omeprazole 40 mg daily (2 x 20 mg).  · The patient also continues to use Carafate 1 g 4 times daily  · Symptoms currently stable  16. Insomnia:  · Patient with prior paradoxical reaction to Benadryl  · Improved previously on temazepam 15 mg nightly as needed.   · Patient noted subsequently that temazepam was having no effect.   · Symptoms currently stable on gabapentin 300 mg in the evening and 300 mg at night  17. Health maintenance:  · Patient notes that she has a history of colon polyps as well as ulcerative colitis and was due for a follow-up colonoscopy on 9/12/2019.  We did discuss there is no necessity to pursue colonoscopy in the setting of her metastatic breast cancer.    · The patient did undergo colonoscopy on 2/7/2020 with findings of muscular hypertrophy and diverticulosis.   18. Right shoulder pain:  · Patient was evaluated by Dr Forrester.  She has experienced difficulty over a long time frame with right shoulder although she has not complained of this in prior visits to our office.  She reported difficulty with abduction.    · The patient did undergo MRI of her right shoulder on 11/21/2019 at an outside facility showing multiple abnormalities including supraspinatus tendinosis, labral tear but no evidence of metastatic disease.  · Patient developed pain in the left shoulder, was seen by orthopedics and underwent steroid injection with some improvement.  Right shoulder is stable currently.  Patient is continuing with physical therapy.  19. Ocular changes in part related to Xeloda:  · Patient experienced a mild degree of blurred vision as well as burning and pruritus.  · She was seen by her ophthalmologist and was placed on xiidra ophthalmic drops which have helped.  · Likely both issues are to some extent related to Xeloda.  · Symptoms did worsen somewhat recently and patient has been seen by a new ophthalmologist at [facility].  She is now  using an ocular lubricant at night in addition to artificial tears which have helped.  20. Elevated glucose:  · It is noted the patient's glucose at the last few visits has been in the high 100 range, postprandial.  · She has had a hemoglobin A1c that has been in the high 5-6 range in the past, on 5/1/2019 at 5.7  · Hemoglobin A1c on 5/7/2021 was improved at 5.2  21. Possible loosening of pedicle screw at T3:  · CT cervical spine 5/17/2021 showed loosening of the left pedicle screw at T3.  Patient referred to orthopedics and was seen by Dr. Nayak who felt that there were no concerning findings on the scan  22. COVID-19 vaccination  · Patient received the Pfizer vaccine, second dose administered on 4/2/2021 without side effects.  · Patient will require third dose COVID-19 vaccine given her immunocompromise status.     Plan:  1. Continue Xeloda 1000 mg a.m., 1500 mg in the p.m. 7 days on followed by 7 days off  2. Monthly Xgeva today  3. Urinalysis and urine culture today  4. Continue Pyridium 200 mg 3 times daily as needed  5. Continue Eliquis 5 mg twice daily.  6. Continue use of emollient cream and urea-based cream on the hands and feet and continue to wear socks and gloves at night as well as use of triamcinolone cream as prescribed by dermatology.  7. Continue omeprazole 40 mg daily and Pepcid 20 mg nightly   8. Continue ocular lubricating gel nightly per ophthalmology  9. Continue Provigil 100 mg daily and Cymbalta 30 mg twice daily per supportive oncology clinic.  Patient continues routine follow-up with supportive oncology.  10. We will arrange a follow-up visit with Dr. May Johnson in urogynecology  11. Patient will continue to see physical therapy for her low back pain, neck pain, left shoulder pain.  12. In 3 weeks CT chest abdomen pelvis and bone scan  13. MD visit in 4 weeks with CBC, CMP, magnesium, phosphorus and Xgeva.     Patient continuing on high risk medication requiring intensive monitoring.

## 2021-10-18 ENCOUNTER — SPECIALTY PHARMACY (OUTPATIENT)
Dept: ONCOLOGY | Facility: HOSPITAL | Age: 61
End: 2021-10-18

## 2021-10-18 ENCOUNTER — LAB (OUTPATIENT)
Dept: OTHER | Facility: HOSPITAL | Age: 61
End: 2021-10-18

## 2021-10-18 ENCOUNTER — INFUSION (OUTPATIENT)
Dept: ONCOLOGY | Facility: HOSPITAL | Age: 61
End: 2021-10-18

## 2021-10-18 ENCOUNTER — OFFICE VISIT (OUTPATIENT)
Dept: ONCOLOGY | Facility: CLINIC | Age: 61
End: 2021-10-18

## 2021-10-18 VITALS
DIASTOLIC BLOOD PRESSURE: 90 MMHG | HEART RATE: 84 BPM | SYSTOLIC BLOOD PRESSURE: 148 MMHG | HEIGHT: 61 IN | RESPIRATION RATE: 18 BRPM | OXYGEN SATURATION: 93 % | BODY MASS INDEX: 35.36 KG/M2 | TEMPERATURE: 98 F | WEIGHT: 187.3 LBS

## 2021-10-18 DIAGNOSIS — Z17.1 MALIGNANT NEOPLASM OF OVERLAPPING SITES OF RIGHT BREAST IN FEMALE, ESTROGEN RECEPTOR NEGATIVE (HCC): Primary | ICD-10-CM

## 2021-10-18 DIAGNOSIS — Z17.1 MALIGNANT NEOPLASM OF OVERLAPPING SITES OF RIGHT BREAST IN FEMALE, ESTROGEN RECEPTOR NEGATIVE (HCC): ICD-10-CM

## 2021-10-18 DIAGNOSIS — R30.0 DYSURIA: ICD-10-CM

## 2021-10-18 DIAGNOSIS — C50.811 MALIGNANT NEOPLASM OF OVERLAPPING SITES OF RIGHT BREAST IN FEMALE, ESTROGEN RECEPTOR NEGATIVE (HCC): Primary | ICD-10-CM

## 2021-10-18 DIAGNOSIS — C50.811 MALIGNANT NEOPLASM OF OVERLAPPING SITES OF RIGHT BREAST IN FEMALE, ESTROGEN RECEPTOR NEGATIVE (HCC): ICD-10-CM

## 2021-10-18 LAB
ALBUMIN SERPL-MCNC: 4.2 G/DL (ref 3.5–5.2)
ALBUMIN/GLOB SERPL: 1.6 G/DL
ALP SERPL-CCNC: 68 U/L (ref 39–117)
ALT SERPL W P-5'-P-CCNC: 14 U/L (ref 1–33)
ANION GAP SERPL CALCULATED.3IONS-SCNC: 7.5 MMOL/L (ref 5–15)
AST SERPL-CCNC: 18 U/L (ref 1–32)
BACTERIA UR QL AUTO: ABNORMAL /HPF
BASOPHILS # BLD AUTO: 0.06 10*3/MM3 (ref 0–0.2)
BASOPHILS NFR BLD AUTO: 0.8 % (ref 0–1.5)
BILIRUB SERPL-MCNC: 0.3 MG/DL (ref 0–1.2)
BILIRUB UR QL STRIP: NEGATIVE
BUN SERPL-MCNC: 24 MG/DL (ref 8–23)
BUN/CREAT SERPL: 27.6 (ref 7–25)
CALCIUM SPEC-SCNC: 9.4 MG/DL (ref 8.6–10.5)
CHLORIDE SERPL-SCNC: 103 MMOL/L (ref 98–107)
CLARITY UR: ABNORMAL
CO2 SERPL-SCNC: 29.5 MMOL/L (ref 22–29)
COLOR UR: YELLOW
CREAT SERPL-MCNC: 0.87 MG/DL (ref 0.57–1)
DEPRECATED RDW RBC AUTO: 63.7 FL (ref 37–54)
EOSINOPHIL # BLD AUTO: 0.27 10*3/MM3 (ref 0–0.4)
EOSINOPHIL NFR BLD AUTO: 3.5 % (ref 0.3–6.2)
ERYTHROCYTE [DISTWIDTH] IN BLOOD BY AUTOMATED COUNT: 16.9 % (ref 12.3–15.4)
GFR SERPL CREATININE-BSD FRML MDRD: 66 ML/MIN/1.73
GLOBULIN UR ELPH-MCNC: 2.6 GM/DL
GLUCOSE SERPL-MCNC: 101 MG/DL (ref 65–99)
GLUCOSE UR STRIP-MCNC: NEGATIVE MG/DL
HCT VFR BLD AUTO: 41.4 % (ref 34–46.6)
HGB BLD-MCNC: 12.8 G/DL (ref 12–15.9)
HGB UR QL STRIP.AUTO: ABNORMAL
HYALINE CASTS UR QL AUTO: ABNORMAL /LPF
IMM GRANULOCYTES # BLD AUTO: 0.03 10*3/MM3 (ref 0–0.05)
IMM GRANULOCYTES NFR BLD AUTO: 0.4 % (ref 0–0.5)
KETONES UR QL STRIP: NEGATIVE
LEUKOCYTE ESTERASE UR QL STRIP.AUTO: ABNORMAL
LYMPHOCYTES # BLD AUTO: 1.17 10*3/MM3 (ref 0.7–3.1)
LYMPHOCYTES NFR BLD AUTO: 15.1 % (ref 19.6–45.3)
MAGNESIUM SERPL-MCNC: 2 MG/DL (ref 1.6–2.4)
MCH RBC QN AUTO: 32.1 PG (ref 26.6–33)
MCHC RBC AUTO-ENTMCNC: 30.9 G/DL (ref 31.5–35.7)
MCV RBC AUTO: 103.8 FL (ref 79–97)
MONOCYTES # BLD AUTO: 0.66 10*3/MM3 (ref 0.1–0.9)
MONOCYTES NFR BLD AUTO: 8.5 % (ref 5–12)
NEUTROPHILS NFR BLD AUTO: 5.58 10*3/MM3 (ref 1.7–7)
NEUTROPHILS NFR BLD AUTO: 71.7 % (ref 42.7–76)
NITRITE UR QL STRIP: POSITIVE
NRBC BLD AUTO-RTO: 0 /100 WBC (ref 0–0.2)
PH UR STRIP.AUTO: 6 [PH] (ref 5–8)
PHOSPHATE SERPL-MCNC: 4.3 MG/DL (ref 2.5–4.5)
PLATELET # BLD AUTO: 256 10*3/MM3 (ref 140–450)
PMV BLD AUTO: 10.5 FL (ref 6–12)
POTASSIUM SERPL-SCNC: 4.6 MMOL/L (ref 3.5–5.2)
PROT SERPL-MCNC: 6.8 G/DL (ref 6–8.5)
PROT UR QL STRIP: NEGATIVE
RBC # BLD AUTO: 3.99 10*6/MM3 (ref 3.77–5.28)
RBC # UR: ABNORMAL /HPF
REF LAB TEST METHOD: ABNORMAL
SODIUM SERPL-SCNC: 140 MMOL/L (ref 136–145)
SP GR UR STRIP: 1.02 (ref 1–1.03)
SQUAMOUS #/AREA URNS HPF: ABNORMAL /HPF
UROBILINOGEN UR QL STRIP: ABNORMAL
WBC # BLD AUTO: 7.77 10*3/MM3 (ref 3.4–10.8)
WBC UR QL AUTO: ABNORMAL /HPF

## 2021-10-18 PROCEDURE — 25010000002 DENOSUMAB 120 MG/1.7ML SOLUTION: Performed by: INTERNAL MEDICINE

## 2021-10-18 PROCEDURE — 87077 CULTURE AEROBIC IDENTIFY: CPT | Performed by: INTERNAL MEDICINE

## 2021-10-18 PROCEDURE — 99214 OFFICE O/P EST MOD 30 MIN: CPT | Performed by: INTERNAL MEDICINE

## 2021-10-18 PROCEDURE — 87186 SC STD MICRODIL/AGAR DIL: CPT | Performed by: INTERNAL MEDICINE

## 2021-10-18 PROCEDURE — 36415 COLL VENOUS BLD VENIPUNCTURE: CPT

## 2021-10-18 PROCEDURE — 83735 ASSAY OF MAGNESIUM: CPT | Performed by: INTERNAL MEDICINE

## 2021-10-18 PROCEDURE — 96372 THER/PROPH/DIAG INJ SC/IM: CPT

## 2021-10-18 PROCEDURE — 87086 URINE CULTURE/COLONY COUNT: CPT | Performed by: INTERNAL MEDICINE

## 2021-10-18 PROCEDURE — 81001 URINALYSIS AUTO W/SCOPE: CPT | Performed by: INTERNAL MEDICINE

## 2021-10-18 PROCEDURE — 84100 ASSAY OF PHOSPHORUS: CPT | Performed by: INTERNAL MEDICINE

## 2021-10-18 PROCEDURE — 80053 COMPREHEN METABOLIC PANEL: CPT | Performed by: INTERNAL MEDICINE

## 2021-10-18 PROCEDURE — 85025 COMPLETE CBC W/AUTO DIFF WBC: CPT | Performed by: INTERNAL MEDICINE

## 2021-10-18 RX ORDER — LANOLIN/MINERAL OIL/PETROLATUM
1 OINTMENT (GRAM) OPHTHALMIC (EYE)
COMMUNITY
Start: 2021-10-12 | End: 2021-11-11

## 2021-10-18 RX ORDER — CALCIUM CITRATE/VITAMIN D3 200MG-6.25
1 TABLET ORAL DAILY
COMMUNITY

## 2021-10-18 RX ADMIN — DENOSUMAB 120 MG: 120 INJECTION SUBCUTANEOUS at 12:40

## 2021-10-18 NOTE — PROGRESS NOTES
MTM Encounter-Re: Adherence and side effects (Xeloda)    Today's encounter was conducted via telemedicine.    Specialty Pharmacy Assessment    Capecitabine (Xeloda)  Dose: Other  Other dose: 1000 mg (2x500 mg tab) every morning and 1500 mg (3x500 mg tabs) every evening for 7 days on, then 7 days off  Indication: Breast cancer  Follow-Up: Yes  Dispensing pharmacy: [facility]  Side effect assessment: No/minimal side effects  Compliance: Patient reports taking tablets whole twice daily, Patient reports taking around the same time every day, Patient reports missing some doses  Number of missed doses: 1  Additional notes: Martha reports excellent adherence to Xeloda - she is taking as prescribed and has missed only one dose in the past 30 days. Patient denies any new side effects- her HFS is improved with this dosing and the urea cream and she states her heartburn has improved since addition of the pepcid, as recommended by our office's APRN.  Patient denies starting any new medications, but there were many old medications on the list that could be removed as the therapy was completed.  Assessed medication list for interactions, no significant drug interactions noted (the interaction between xeloda and PPI is noted, but patient is stable on this and pt needs PPI). Patient has had no issues obtaining medication from pharmacy.          Martha had no additional questions or concerns for the MTM office.     Elizabeth Schafer, Pharmacist  10/18/2021  12:15 EDT

## 2021-10-18 NOTE — NURSING NOTE
Arrived ambulatory for xgeva injection. Indication and side effects reviewed. Denies recent dental work. Labs and medications verified. Xgeva administered in left arm without incidence. Instructed to call for any concerns or questions.  Pt vu and discharged ambulatory.

## 2021-10-19 ENCOUNTER — TELEPHONE (OUTPATIENT)
Dept: PHYSICAL THERAPY | Facility: CLINIC | Age: 61
End: 2021-10-19

## 2021-10-19 RX ORDER — FAMOTIDINE 20 MG/1
20 TABLET, FILM COATED ORAL 2 TIMES DAILY
COMMUNITY
End: 2021-11-15

## 2021-10-20 ENCOUNTER — TELEPHONE (OUTPATIENT)
Dept: ONCOLOGY | Facility: CLINIC | Age: 61
End: 2021-10-20

## 2021-10-20 LAB — BACTERIA SPEC AEROBE CULT: ABNORMAL

## 2021-10-20 RX ORDER — TEMAZEPAM 15 MG/1
15 CAPSULE ORAL NIGHTLY PRN
Qty: 90 CAPSULE | Refills: 0 | Status: SHIPPED | OUTPATIENT
Start: 2021-10-20 | End: 2022-01-10

## 2021-10-20 RX ORDER — NITROFURANTOIN 25; 75 MG/1; MG/1
100 CAPSULE ORAL 2 TIMES DAILY
Qty: 14 CAPSULE | Refills: 0 | Status: SHIPPED | OUTPATIENT
Start: 2021-10-20 | End: 2021-10-27

## 2021-10-20 NOTE — TELEPHONE ENCOUNTER
Patient returned call. Notified her that urine culture showed growth of E. Coli, and that Dr. Hancock ordered a 7-day supply of Macrobid for treatment. Patient v/u. Verified patient's preferred local pharmacy for Macrobid. Patient also requested a refill of her temazepam. Refill sent to mail order pharmacy on file.

## 2021-10-26 ENCOUNTER — TELEPHONE (OUTPATIENT)
Dept: PHYSICAL THERAPY | Facility: CLINIC | Age: 61
End: 2021-10-26

## 2021-10-28 ENCOUNTER — OFFICE VISIT (OUTPATIENT)
Dept: PSYCHIATRY | Facility: HOSPITAL | Age: 61
End: 2021-10-28

## 2021-10-28 DIAGNOSIS — F41.1 GENERALIZED ANXIETY DISORDER: ICD-10-CM

## 2021-10-28 DIAGNOSIS — F33.1 MAJOR DEPRESSIVE DISORDER, RECURRENT EPISODE, MODERATE (HCC): Primary | ICD-10-CM

## 2021-10-28 DIAGNOSIS — R53.0 NEOPLASTIC MALIGNANT RELATED FATIGUE: ICD-10-CM

## 2021-10-28 PROCEDURE — 99214 OFFICE O/P EST MOD 30 MIN: CPT | Performed by: NURSE PRACTITIONER

## 2021-11-04 DIAGNOSIS — E78.5 HYPERLIPIDEMIA, UNSPECIFIED HYPERLIPIDEMIA TYPE: Primary | ICD-10-CM

## 2021-11-04 DIAGNOSIS — I10 HYPERTENSION, UNSPECIFIED TYPE: ICD-10-CM

## 2021-11-04 DIAGNOSIS — R73.9 HYPERGLYCEMIA: ICD-10-CM

## 2021-11-08 ENCOUNTER — HOSPITAL ENCOUNTER (OUTPATIENT)
Dept: NUCLEAR MEDICINE | Facility: HOSPITAL | Age: 61
Discharge: HOME | End: 2021-11-08

## 2021-11-08 ENCOUNTER — SPECIALTY PHARMACY (OUTPATIENT)
Dept: PHARMACY | Facility: HOSPITAL | Age: 61
End: 2021-11-08

## 2021-11-08 ENCOUNTER — HOSPITAL ENCOUNTER (OUTPATIENT)
Dept: CT IMAGING | Facility: HOSPITAL | Age: 61
Discharge: HOME | End: 2021-11-08
Admitting: INTERNAL MEDICINE

## 2021-11-08 DIAGNOSIS — Z17.1 MALIGNANT NEOPLASM OF OVERLAPPING SITES OF RIGHT BREAST IN FEMALE, ESTROGEN RECEPTOR NEGATIVE (HCC): ICD-10-CM

## 2021-11-08 DIAGNOSIS — C50.811 MALIGNANT NEOPLASM OF OVERLAPPING SITES OF RIGHT BREAST IN FEMALE, ESTROGEN RECEPTOR NEGATIVE (HCC): ICD-10-CM

## 2021-11-08 LAB — CREAT BLDA-MCNC: 0.9 MG/DL (ref 0.6–1.3)

## 2021-11-08 PROCEDURE — 0 TECHNETIUM MEDRONATE KIT: Performed by: INTERNAL MEDICINE

## 2021-11-08 PROCEDURE — 71260 CT THORAX DX C+: CPT

## 2021-11-08 PROCEDURE — 78306 BONE IMAGING WHOLE BODY: CPT

## 2021-11-08 PROCEDURE — A9503 TC99M MEDRONATE: HCPCS | Performed by: INTERNAL MEDICINE

## 2021-11-08 PROCEDURE — 0 DIATRIZOATE MEGLUMINE & SODIUM PER 1 ML: Performed by: INTERNAL MEDICINE

## 2021-11-08 PROCEDURE — 74177 CT ABD & PELVIS W/CONTRAST: CPT

## 2021-11-08 PROCEDURE — 82565 ASSAY OF CREATININE: CPT

## 2021-11-08 PROCEDURE — 25010000002 IOPAMIDOL 61 % SOLUTION: Performed by: INTERNAL MEDICINE

## 2021-11-08 RX ORDER — TC 99M MEDRONATE 20 MG/10ML
22.3 INJECTION, POWDER, LYOPHILIZED, FOR SOLUTION INTRAVENOUS
Status: COMPLETED | OUTPATIENT
Start: 2021-11-08 | End: 2021-11-08

## 2021-11-08 RX ADMIN — Medication 22.3 MILLICURIE: at 09:22

## 2021-11-08 RX ADMIN — IOPAMIDOL 85 ML: 612 INJECTION, SOLUTION INTRAVENOUS at 10:28

## 2021-11-08 RX ADMIN — DIATRIZOATE MEGLUMINE AND DIATRIZOATE SODIUM 30 ML: 600; 100 SOLUTION ORAL; RECTAL at 10:28

## 2021-11-09 ENCOUNTER — SPECIALTY PHARMACY (OUTPATIENT)
Dept: PHARMACY | Facility: HOSPITAL | Age: 61
End: 2021-11-09

## 2021-11-09 NOTE — PROGRESS NOTES
Specialty Pharmacy Refill Coordination Note     Martha is a 60 y.o. female contacted today regarding refills of  Xeloda specialty medication(s).    Reviewed and verified with patient: Allergies  Meds       Specialty medication(s) and dose(s) confirmed: Yes    Refill Questions      Most Recent Value   Changes to allergies? No   Changes to medications? No   New conditions since last clinic visit No   Unplanned office visit, urgent care, ED, or hospital admission in the last 4 weeks  No   How does patient/caregiver feel medication is working? Very good   Financial problems or insurance changes  No   How many doses of your specialty medications were missed in the last 4 weeks? 0          Delivery Questions      Most Recent Value   Delivery method [employer]  [[employer] Priority (signature required) - ship on 11/11 to be delivered 11/12]   Delivery address correct? Yes   Preferred delivery time? --  [[employer] Priority (signature required) - ship on 11/11 to be delivered 11/12]   Number of medications in delivery 1   Medication being filled and delivered Xeloda   Doses left of specialty medications forgot to ask   Is there any medication that is due not being filled? No          Medication Adherence    Any gaps in refill history greater than 2 weeks in the last 3 months: no  Demonstrates understanding of importance of adherence: yes  Informant: patient  Reliability of informant: reliable  Provider-estimated medication adherence level: %  Reasons for non-adherence: no problems identified          Follow-up: 3 week(s)    Elizabeth Schafer Pharmacist  11/9/2021  13:55 EST

## 2021-11-12 NOTE — PROGRESS NOTES
Chief Complaint  Previous Stage Ib ([id number]) ER/TX positive, HER-2/peter negative right breast cancer with subsequent metastatic disease identified 10/8/2017, history of right pulmonary embolism, cancer related pain, chemotherapy-induced diarrhea, chemotherapy-induced mucositis, chemotherapy-induced hand-foot syndrome    Subjective        History of Present Illness  The patient returns today in follow-up continuing on oral Xeloda 1000 mg in the morning, 1500 mg in the evening for 7 days on followed by 7 days off as well as monthly Xgeva and Eliquis 5 mg twice daily.  Patient returns today in follow-up due for Xgeva. She has laboratory studies and scans to review today (3-month interval scans in parentheses. Patient reports that since her last visit she has been doing relatively well. Her hands have improved slightly in terms of the skin thickening leading to improvement in mobility of her fingers. She still has limitations with her dexterity. She is continued skin peeling. The degree of erythema and involvement of the dorsal aspect of her hands has decreased. She reports that her feet are improved. She does have a callus on her left foot related to her brace and will be seeing her podiatrist soon. She does continue to use emollient cream frequently. She uses triamcinolone and urea-based cream intermittently when symptoms worsen. She remains active, walking at least a mile 4 times per week. She has some minor limitations in her mobility, uses a cane and is stable. She notes significant problems with reflux. She is continuing on Prilosec 20 mg twice daily. She does elevate the head of her bed. She occasionally eats late at night and reports that her symptoms do increase. She does drink bourbon 3 to 4 days a week and we discussed that this is a contributing factor. She drinks 1 cup of coffee per day. She feels that her cough is worse at night and is exacerbated by reflux. She feels as if she is intermittently  "aspirating at night. She has not yet been back to see her urogynecologist, is scheduled to be seen on 12/7/2021. Today she reports recent onset again of dysuria and urinary frequency as well as malodorous urine. She continues routine follow-up in the supportive oncology clinic, last seen on 11/1/2021.      Objective   Vital Signs:   /83   Pulse 91   Temp 97.8 °F (36.6 °C) (Temporal)   Resp 18   Ht 156.2 cm (61.5\")   Wt 86 kg (189 lb 9.6 oz)   SpO2 97%   BMI 35.25 kg/m²     Physical Exam  Constitutional:       Appearance: She is well-developed.   Eyes:      Conjunctiva/sclera: Conjunctivae normal.   Neck:      Thyroid: No thyromegaly.   Cardiovascular:      Rate and Rhythm: Normal rate and regular rhythm.      Heart sounds: No murmur heard.  No friction rub. No gallop.    Pulmonary:      Effort: No respiratory distress.      Breath sounds: Normal breath sounds.   Chest:   Breasts:      Right: No supraclavicular adenopathy.      Left: No supraclavicular adenopathy.       Abdominal:      General: Bowel sounds are normal. There is no distension.      Palpations: Abdomen is soft.      Tenderness: There is no abdominal tenderness.   Lymphadenopathy:      Head:      Right side of head: No submandibular adenopathy.      Cervical: No cervical adenopathy.      Upper Body:      Right upper body: No supraclavicular adenopathy.      Left upper body: No supraclavicular adenopathy.   Skin:     General: Skin is warm and dry.      Findings: Rash present.      Comments: There remains palmar erythema with skin cracking and peeling. The extent onto the dorsal aspect of the hands has improved significantly. The degree of sclerodactyly has improved with reduced skin thickening in the hands in general and improve mobility of the fingers.    Neurological:      Mental Status: She is alert and oriented to person, place, and time.      Cranial Nerves: No cranial nerve deficit.      Motor: No abnormal muscle tone.      Deep Tendon " Reflexes: Reflexes normal.   Psychiatric:         Behavior: Behavior normal.        Result Review : Reviewed CBC, CMP, magnesium, phosphorus from today. Reviewed CT chest abdomen pelvis and bone scan from 11/8/2021. Reviewed progress notes from supportive oncology clinic.     Assessment and Plan     1. Previous Stage Ib ([id number]) right breast cancer:  · Diagnosed May 2010 with excisional biopsy for invasive ductal carcinoma, 1.3 cm, grade 2, ER 90%, OK 80%, HER-2/peter negative (1+ IHC).    · Subsequent right mastectomy in July 2010 with no residual breast malignancy, 1/5 sentinel lymph node with micrometastasis (0.25 mm).    · Treated in the [facility] system with adjuvant AC ×4 cycles in 2010 (no taxanes administered due to underlying Charcot-Saloni-Tooth with peripheral neuropathy).    · Adjuvant Femara (postmenopausal) initiated October 2010 with plan to treat ×10 years.    · Genetic testing reportedly negative.    · Developed osteopenia treated with Prolia beginning 2/27/13. Subsequently discontinued due to identification of metastatic disease.  2. Recurrent/metastatic disease identified 10/8/17:  · Disease involving thoracic spine with cord compression at T6, lumbosacral involvement, sternal and right sternoclavicular involvement.    · Femara discontinued in 10/2017.    · Radiation administered (in the [facility] system) to the thoracic spine beginning 10/19/17 treating T3-T9 to a dose of 24 gray in 6 fractions.  · Evaluation with MRI 12/8/17 showing persistent T6 cord compression with persistent neurologic compromise requiring surgical treatment 12/11/17 with T6 laminectomy/corpectomy and T3-T9 fusion.  Pathology with metastatic carcinoma of breast origin, ER negative, OK negative, HER-2/peter negative (1+ IHC).  · Additional staging evaluation 12/8/17 with no evidence of visceral metastatic disease, bone scan showing involvement of thoracic spine, sternum, left humerus, mid frontal bone.  No plane film correlate of  left humerus lesion.  MRI lumbar spine with small intradural L3 metastasis.  CA 15-3 12/6/17- 17.  · Palliative radiation therapy to L3 dural metastasis and left humerus initiated 1/15/18 (10 fractions), completed 1/26/18.  · Hypercalcemia of malignancy with calcium in the 10-11 range.  · Initiation of monthly Xgeva 1/23/18.  · Baseline CT scan 1/30/18 with no evidence of visceral involvement.  Cluster of nodular opacities in the right lower lobe suspected to be infectious or related to bronchiolitis. Bone scan 1/30/18 showed postsurgical change in the thoracic spine, stable uptake in the frontal bone, no new areas of disease.  · Initiation of palliative oral single agent Xeloda 2/7/18 2000 mg a.m., 1500 mg p.m. for 14/21 days.   · Following 3 cycles xeloda, bone scan 4/4/18 showed no change from the prior study.  CT scan 4/4/18 showed a small pericardial effusion of unclear significance as well as a subcutaneous nodule in the right lateral chest wall.  Subsequent evaluation with echocardiogram 4/17/18 showed no evidence of pericardial effusion.  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  We discussed the possibility of surgical excision of the right subcutaneous chest wall lesion for more definitive diagnosis.  Reviewed previous CT images dating back to 12/8/17 and the lesion was present even at that time measuring around 1.7 cm although not commented on in the radiology report.  As this appears to be an indolent low-grade process unrelated to her breast cancer, recommendee foregoing surgical excision at this time and monitoring this area on future scans.  The patient agreed.    · Following 6 cycles of Xeloda, CT 6/6/18  showed stable findings, no evidence of progressive disease.  There was a comment regarding subcutaneous abnormality in the anterior abdominal wall and  this was related to Lovenox injection sites.  Bone scan 6/6/18 showed no interval change.   · CT scan and bone scan 8/13/18 following 9 cycles of Xeloda showed stable findings with no evidence of significant visceral metastases.  Her bone lesions appear stable on bone scan.  The spindle cell neoplasm in her right chest wall actually decreased in size from 2 cm down to 1.6 cm.    · The patient experienced some symptoms of diarrhea, anorexia, generalized weakness during cycle 9 Xeloda it was unclear whether this was related to a viral gastroenteritis or toxicity from treatment.  Symptoms recurred during cycle 10 and treatment was cut short by 2 days.  Symptoms attributed to Xeloda.  With cycle 11, dose and schedule altered to 1500 mg twice daily for 7 days on followed by 7 days off .  · CT scan 9/9/2020 with no significant changes.  Bone scan 9/9/2020 read as unchanged from prior studies however did note an area of slight activity in the medial left femur.  Contacted radiology and although this was not noted on prior reports appears to have been present.  Subsequent MRI left femur 9/21/2020 with cortical thickening and periosteal edema left iliac us muscle insertion to the medial left femur with no evidence of metastatic disease, favored to represent periosteal change secondary to insertional tendinitis.  · Severe hand-foot symptoms causing sclerodactyly and limitation in finger movement prompted change in dosing in July 2021 with Xeloda decreased to 1000 mg a.m., 1500 mg p.m. for 7 days on followed by 7 days off.  · Most recent 3-month interval scans from 11/8/2021 with CT chest abdomen pelvis, bone scan performed.  CT scans showed some lobulated nodular opacity in cluster of nodules in the right lower lobe (felt to be infectious/inflammatory), no other changes.  Bone scan was stable.  · The patient returns today continuing on treatment with Xeloda 1000 mg a.m., 1500 mg p.m. 7 days on followed by 7 days off.  She is  due today for monthly Xgeva.  The patient has CT scans and bone scan to review from 11/8/2021 at a 3-month interval as noted above.  Fortunately, scan showed no evidence of progressive disease, do show decrease in some small nodular opacities in the right lower lobe that are felt to be infectious/inflammatory.  I suspect that these are related to prior aspiration from her significant reflux issues.  The patient has experienced some chronic side effects from Xeloda, mainly related to hand-foot syndrome and ocular dryness.  Her hand-foot symptoms have improved since we modified her Xeloda dose, however do remain significant.  She continues to use emollient creams frequently and uses urea and triamcinolone as needed.  There has been some improvement today on exam with decrease in the degree of skin thickening and sclerodactyly and an increase in movement of her fingers.  There has been a decrease in the amount of erythema extending onto the dorsal aspect of her hands.  In regards to her dry eyes, she is seeing ophthalmology and is using an ocular lubricant at night which has helped significantly and does use artificial tears during the day.  She feels that symptoms are currently tolerable in this regard as well.  We will continue on with her treatment as planned with Xeloda and she will receive monthly Xgeva today.  She will return in 4 weeks for nurse practitioner visit and Xgeva.  In 8 weeks to return for MD visit and Xgeva.  In 11 weeks she will undergo repeat CT chest abdomen pelvis and bone scan and then have MD visit again in 12 weeks when she is due for Xgeva.  3. Right pulmonary embolism:  · Diagnosed on CT angiogram 10/21/17 involving small right lower lobe pulmonary artery.  Lower extremity Dopplers negative.  · Bilateral lower extremity Dopplers negative again 12/5/17.  · Received chronic Lovenox 1 mg/kg twice per day, transition to oral Eliquis in February 2019, continuing on Eliquis 5 mg twice  daily.  · The patient has had no bleeding difficulties on anticoagulation  4. Cancer related pain:  · Previously receiving Duragesic 50 µg patch every 72 hours along with Dilaudid 4 mg as needed for breakthrough pain  · The patient's pain improved over time and she was able to discontinue both Duragesic and Dilaudid in the interval.  · Patient does take occasional Flexeril at bedtime due to back spasm/pain when she has been more active.  · The patient does have some occasional aggravation of her chronic back pain and does use tramadol 50 mg every 8 hours as needed  · Patient's pain is stable.  She does continue to use tramadol 50 mg every 12 hours as needed which has been effective.  The patient does have chronic difficulty with pain involving her low back, left shoulder.  Physical therapy has helped to produce some relief.  Pain is stable to improved.  5. Chemotherapy-induced diarrhea with subsequent C. difficile colitis in the setting of previous ulcerative colitis:  · Patient with reported history of ulcerative colitis, is not on active therapy.  · The patient developed initial diarrhea related to Xeloda at regular dosing.  · Symptoms improved on reduced dose Xeloda  · Flare of symptoms in October 2018 with apparent finding of C. difficile colitis by GI, treated with course of oral vancomycin with improvement in symptoms.  · Patient notes minimal intermittent diarrhea/loose stools on Xeloda requiring occasional dosing of Imodium.  She reports that her bowel function recently has been fairly stable.  6. Traumatic left tibia/fibular fracture:  · Status post ORIF 12/6/17  · Specimen was sent for pathologic review, negative for evidence of malignancy  7. Hypercalcemia:  · Suspect hypercalcemia of malignancy, calcium in  10-11 range previously.  · Calcium normalized following initiation of monthly Xgeva on 1/23/18.  8. Chemotherapy-induced mucositis:  · Patient had a minimal degree of mucositis with cycle 2.  The  patient has magic mouthwash to use as needed.  No subsequent mucositis.  9. Recurrent UTI, bladder wall thickening on CT:  · Patient had an enterococcal UTI on 3/2/18 sensitive to nitrofurantoin and received treatment, unclear how long.  · Recurrent UTI 3/20/18 with urine culture growing Klebsiella, initially treated with nitrofurantoin, transitioned to Levaquin.  · CT 4/4/18 with diffuse bladder wall thickening with increased nodular thickening at the left base.  Referral to urogynecology Dr. May Johnson.  She was placed on a prophylactic dose of trimethoprim 100 mg daily, bladder wall thickening felt to be related to recent recurrent urinary tract infections.  · Patient with urinary symptoms, treated with course of Macrobid at the end of December 2018, urine culture however was negative 12/31/18.  · Patient was found to have Klebsiella UTI 7/29/2019 which was successfully treated with Macrobid with complete resolution of symptoms.  · CT 8/10/2021 with diffuse bladder wall thickening new from 5/17/2021 (however seen on multiple prior scans).    · UTI on 10/18/2021 with E. coli greater than 100,000 colonies that was pansensitive, treated with Macrobid x7 days  · Patient today again reports increasing dysuria and urinary frequency along with malodorous urine.  We are obtaining urinalysis and urine culture with subsequent antibiotic therapy pending culture results.  Patient has been referred back to urogynecology, is scheduled to be seen on 12/7/2021.  10.   Mobility:  · The patient underwent an intensive course of rehabilitation at [facility].  She graduated from her outpatient course November 2018.  · Overall the patient has improved dramatically in terms of mobility, now walking around 1 mile per day without assistance.  11.  Depression:  · The patient is continuing follow-up in the supportive oncology clinic and is currently continuing on Cymbalta 30 mg twice daily as well as gabapentin 600 mg nightly.  · The patient  feels that her depression symptoms are currently fairly well controlled.  She has seen some benefit from attending support groups at [employer] which she plans to continue.  · The patient does continue follow-up in supportive oncology clinic.  12. 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  Symptoms did improve and she continued on the same dose.    · Patient was referred to dermatology and has been continuing on triamcinolone 0.1% cream used for 1 week on followed by 1 week off which led to some further improvement.   · Progressive palmar erythema with development of sclerodactyly and effect on dexterity.  Addition of urea-based cream 3 times daily.  · Patient with continued difficulty regarding erythema of her hands that extended onto the dorsal aspect and was causing contractures/sclerodactyly in her fingers affecting her dexterity.  In July 2021, held Xeloda for an additional week and then reduced dose from 1500 mg twice daily down to 1000 mg a.m. and 1500 mg p.m. and continued on a 7-day on followed by 7-day off schedule.  · With reduction in Xeloda dose, slight improvement in erythema involving dorsal aspect of the hands and slight decrease in sclerodactyly  · Patient today with ongoing difficulties with hand-foot syndrome.  She has continued evidence of erythematous, dry, cracked skin involving the palms of the hands.  The degree of erythema involving the dorsal aspect of the hands has decreased.  The degree of sclerodactyly with skin thickening has decreased mobility of the patient's fingers as increased.  She will continue with frequent application of emollient creams urea and triamcinolone creams as needed.  13. Evidence of steatosis on scans with mild intermittent elevated liver function studies:  · Liver function studies  increased 8/20/19 with ALT 98, AST 70, normal total bilirubin.  · Negative viral hepatitis A, B, and C panel 8/23/18  · Likely related to hepatic steatosis.   · Subsequent improvement in LFTs  · Today, LFTs normal  14. Chemotherapy induced leukopenia:  · WBC today  6.76  15. GERD:  · Patient with significant history of reflux  · Patient currently receiving Prilosec 20 mg twice daily  · Patient feels as if she is having episodes of aspiration at night, may explain recent findings in the right lower lobe on CT scan.  We had a lengthy discussion today regarding management of her reflux.  Does elevate the head of her bed.  She does drink bourbon at least 3 days/week at night and we discussed that she should stop alcohol for now.  Eating at night exacerbates her symptoms and we discussed avoiding this.  We discussed avoiding caffeine.  I am adding a prescription back in for Carafate 1 g 4 times daily (had been on this past).  I have asked her also to return to see Dr. Ocasio, her gastroenterologist.  16. Insomnia:  · Patient with prior paradoxical reaction to Benadryl  · Improved previously on temazepam 15 mg nightly as needed.   · Patient noted subsequently that temazepam was having no effect.   · Symptoms currently stable on gabapentin 600 mg nightly  17. Health maintenance:  · Patient notes that she has a history of colon polyps as well as ulcerative colitis and was due for a follow-up colonoscopy on 9/12/2019.  We did discuss there is no necessity to pursue colonoscopy in the setting of her metastatic breast cancer.    · The patient did undergo colonoscopy on 2/7/2020 with findings of muscular hypertrophy and diverticulosis.   18. Right shoulder pain:  · Patient was evaluated by Dr Forrester.  She has experienced difficulty over a long time frame with right shoulder although she has not complained of this in prior visits to our office.  She reported difficulty with abduction.    · The patient did undergo MRI of her  right shoulder on 11/21/2019 at an outside facility showing multiple abnormalities including supraspinatus tendinosis, labral tear but no evidence of metastatic disease.  · Patient developed pain in the left shoulder, was seen by orthopedics and underwent steroid injection with some improvement.  Right shoulder is stable currently.  Patient is continuing with physical therapy.  19. Ocular changes in part related to Xeloda:  · Patient experienced a mild degree of blurred vision as well as burning and pruritus.  · She was seen by her ophthalmologist and was placed on xiidra ophthalmic drops which have helped.  · Likely both issues are to some extent related to Xeloda.  · Symptoms did worsen somewhat recently and patient has been seen by a new ophthalmologist at [facility].  She is now using an ocular lubricant at night in addition to artificial tears which have helped.  20. Elevated glucose:  · It is noted the patient's glucose at the last few visits has been in the high 100 range, postprandial.  · She has had a hemoglobin A1c that has been in the high 5-6 range in the past, on 5/1/2019 at 5.7  · Hemoglobin A1c on 5/7/2021 was improved at 5.2  21. Possible loosening of pedicle screw at T3:  · CT cervical spine 5/17/2021 showed loosening of the left pedicle screw at T3.  Patient referred to orthopedics and was seen by Dr. Nayak who felt that there were no concerning findings on the scan  22. COVID-19 vaccination  · Patient received the Pfizer vaccine, second dose administered on 4/2/2021 without side effects.  · Patient received her third dose COVID-19 injection in August 2021     Plan:  1. Continue Xeloda 1000 mg a.m., 1500 mg in the p.m. 7 days on followed by 7 days off  2. Monthly Xgeva today  3. Urinalysis and urine culture again today. We will notify her if urine culture indicates need for antibiotic therapy  4. Continue Pyridium 200 mg 3 times daily as needed, refill provided  5. Continue Eliquis 5  mg twice daily.  6. Continue use of emollient cream frequently and urea-based cream and triamcinolone as needed on the hands and feet and continue to wear socks and gloves at night .  7. Continue omeprazole 20 mg twice daily  8. Prescription sent for Carafate 1 g 4 times daily  9. Patient was advised to stop alcohol use, avoid eating late at night, decrease caffeine intake in regards to her reflux  10. We will arrange an appointment for her to return to see Dr. Ocasio in GI regarding her ongoing reflux symptoms and significant cough related to her reflux.   11. Continue ocular lubricating gel nightly per ophthalmology  12. Continue Provigil 100 mg daily and Cymbalta 30 mg twice daily per supportive oncology clinic.  Patient continues routine follow-up with supportive oncology.  13. Patient is scheduled to see Dr. May Johnson in urogynecology on 12/7/2021  14. In 4 weeks nurse practitioner visit with CBC, CMP, magnesium, phosphorus and monthly Xgeva  15. In 8 weeks MD visit with CBC, CMP, magnesium, phosphorus and monthly Xgeva  16. In 11 weeks CT chest abdomen pelvis and bone scan  17. In 12 weeks MD visit with CBC, CMP, magnesium, phosphorus and monthly Xgeva. We will review scan results at that time.     Patient continuing on high risk medication requiring intensive monitoring.              I did spend 50 minutes in total time caring for the patient today, time spent in review of records, face-to-face consultation, coordination of care, placement of orders, completion of documentation.

## 2021-11-13 DIAGNOSIS — R73.01 IFG (IMPAIRED FASTING GLUCOSE): Primary | ICD-10-CM

## 2021-11-13 LAB
ALBUMIN SERPL-MCNC: 4.1 G/DL (ref 3.8–4.9)
ALBUMIN/GLOB SERPL: 2.1 {RATIO} (ref 1.2–2.2)
ALP SERPL-CCNC: 59 IU/L (ref 44–121)
ALT SERPL-CCNC: 17 IU/L (ref 0–32)
AST SERPL-CCNC: 22 IU/L (ref 0–40)
BASOPHILS # BLD AUTO: 0 X10E3/UL (ref 0–0.2)
BASOPHILS NFR BLD AUTO: 1 %
BILIRUB SERPL-MCNC: 0.3 MG/DL (ref 0–1.2)
BUN SERPL-MCNC: 20 MG/DL (ref 8–27)
BUN/CREAT SERPL: 23 (ref 12–28)
CALCIUM SERPL-MCNC: 9.7 MG/DL (ref 8.7–10.3)
CHLORIDE SERPL-SCNC: 102 MMOL/L (ref 96–106)
CHOLEST SERPL-MCNC: 205 MG/DL (ref 100–199)
CO2 SERPL-SCNC: 29 MMOL/L (ref 20–29)
CREAT SERPL-MCNC: 0.86 MG/DL (ref 0.57–1)
EOSINOPHIL # BLD AUTO: 0.2 X10E3/UL (ref 0–0.4)
EOSINOPHIL NFR BLD AUTO: 2 %
ERYTHROCYTE [DISTWIDTH] IN BLOOD BY AUTOMATED COUNT: 16.8 % (ref 11.7–15.4)
GLOBULIN SER CALC-MCNC: 2 G/DL (ref 1.5–4.5)
GLUCOSE SERPL-MCNC: 117 MG/DL (ref 65–99)
HCT VFR BLD AUTO: 37.2 % (ref 34–46.6)
HDLC SERPL-MCNC: 76 MG/DL
HGB BLD-MCNC: 12.3 G/DL (ref 11.1–15.9)
IMM GRANULOCYTES # BLD AUTO: 0 X10E3/UL (ref 0–0.1)
IMM GRANULOCYTES NFR BLD AUTO: 0 %
LDLC SERPL CALC-MCNC: 109 MG/DL (ref 0–99)
LYMPHOCYTES # BLD AUTO: 1 X10E3/UL (ref 0.7–3.1)
LYMPHOCYTES NFR BLD AUTO: 13 %
MCH RBC QN AUTO: 31.1 PG (ref 26.6–33)
MCHC RBC AUTO-ENTMCNC: 33.1 G/DL (ref 31.5–35.7)
MCV RBC AUTO: 94 FL (ref 79–97)
MONOCYTES # BLD AUTO: 0.4 X10E3/UL (ref 0.1–0.9)
MONOCYTES NFR BLD AUTO: 5 %
NEUTROPHILS # BLD AUTO: 6.4 X10E3/UL (ref 1.4–7)
NEUTROPHILS NFR BLD AUTO: 79 %
PLATELET # BLD AUTO: 293 X10E3/UL (ref 150–450)
POTASSIUM SERPL-SCNC: 4.9 MMOL/L (ref 3.5–5.2)
PROT SERPL-MCNC: 6.1 G/DL (ref 6–8.5)
RBC # BLD AUTO: 3.95 X10E6/UL (ref 3.77–5.28)
SODIUM SERPL-SCNC: 143 MMOL/L (ref 134–144)
TRIGL SERPL-MCNC: 115 MG/DL (ref 0–149)
TSH SERPL DL<=0.005 MIU/L-ACNC: 3.01 UIU/ML (ref 0.45–4.5)
VLDLC SERPL CALC-MCNC: 20 MG/DL (ref 5–40)
WBC # BLD AUTO: 8.1 X10E3/UL (ref 3.4–10.8)

## 2021-11-15 ENCOUNTER — OFFICE VISIT (OUTPATIENT)
Dept: ONCOLOGY | Facility: CLINIC | Age: 61
End: 2021-11-15

## 2021-11-15 ENCOUNTER — INFUSION (OUTPATIENT)
Dept: ONCOLOGY | Facility: HOSPITAL | Age: 61
End: 2021-11-15

## 2021-11-15 ENCOUNTER — LAB (OUTPATIENT)
Dept: OTHER | Facility: HOSPITAL | Age: 61
End: 2021-11-15

## 2021-11-15 VITALS
BODY MASS INDEX: 35.8 KG/M2 | HEIGHT: 61 IN | TEMPERATURE: 97.8 F | SYSTOLIC BLOOD PRESSURE: 145 MMHG | DIASTOLIC BLOOD PRESSURE: 83 MMHG | OXYGEN SATURATION: 97 % | WEIGHT: 189.6 LBS | RESPIRATION RATE: 18 BRPM | HEART RATE: 91 BPM

## 2021-11-15 DIAGNOSIS — Z17.1 MALIGNANT NEOPLASM OF OVERLAPPING SITES OF RIGHT BREAST IN FEMALE, ESTROGEN RECEPTOR NEGATIVE (HCC): Primary | ICD-10-CM

## 2021-11-15 DIAGNOSIS — C50.811 MALIGNANT NEOPLASM OF OVERLAPPING SITES OF RIGHT BREAST IN FEMALE, ESTROGEN RECEPTOR NEGATIVE (HCC): Primary | ICD-10-CM

## 2021-11-15 DIAGNOSIS — C50.811 MALIGNANT NEOPLASM OF OVERLAPPING SITES OF RIGHT BREAST IN FEMALE, ESTROGEN RECEPTOR NEGATIVE (HCC): ICD-10-CM

## 2021-11-15 DIAGNOSIS — K21.9 GASTROESOPHAGEAL REFLUX DISEASE, UNSPECIFIED WHETHER ESOPHAGITIS PRESENT: Primary | ICD-10-CM

## 2021-11-15 DIAGNOSIS — R30.0 DYSURIA: ICD-10-CM

## 2021-11-15 DIAGNOSIS — Z17.1 MALIGNANT NEOPLASM OF OVERLAPPING SITES OF RIGHT BREAST IN FEMALE, ESTROGEN RECEPTOR NEGATIVE (HCC): ICD-10-CM

## 2021-11-15 LAB
ALBUMIN SERPL-MCNC: 4.4 G/DL (ref 3.5–5.2)
ALBUMIN/GLOB SERPL: 1.5 G/DL
ALP SERPL-CCNC: 60 U/L (ref 39–117)
ALT SERPL W P-5'-P-CCNC: 16 U/L (ref 1–33)
ANION GAP SERPL CALCULATED.3IONS-SCNC: 8.3 MMOL/L (ref 5–15)
AST SERPL-CCNC: 20 U/L (ref 1–32)
BACTERIA UR QL AUTO: ABNORMAL /HPF
BASOPHILS # BLD AUTO: 0.05 10*3/MM3 (ref 0–0.2)
BASOPHILS NFR BLD AUTO: 0.7 % (ref 0–1.5)
BILIRUB SERPL-MCNC: 0.3 MG/DL (ref 0–1.2)
BILIRUB UR QL STRIP: NEGATIVE
BUN SERPL-MCNC: 20 MG/DL (ref 8–23)
BUN/CREAT SERPL: 22.5 (ref 7–25)
CALCIUM SPEC-SCNC: 9.8 MG/DL (ref 8.6–10.5)
CHLORIDE SERPL-SCNC: 102 MMOL/L (ref 98–107)
CLARITY UR: CLEAR
CO2 SERPL-SCNC: 30.7 MMOL/L (ref 22–29)
COLOR UR: YELLOW
CREAT SERPL-MCNC: 0.89 MG/DL (ref 0.57–1)
DEPRECATED RDW RBC AUTO: 62.9 FL (ref 37–54)
EOSINOPHIL # BLD AUTO: 0.18 10*3/MM3 (ref 0–0.4)
EOSINOPHIL NFR BLD AUTO: 2.7 % (ref 0.3–6.2)
ERYTHROCYTE [DISTWIDTH] IN BLOOD BY AUTOMATED COUNT: 17.4 % (ref 12.3–15.4)
GFR SERPL CREATININE-BSD FRML MDRD: 65 ML/MIN/1.73
GLOBULIN UR ELPH-MCNC: 2.9 GM/DL
GLUCOSE SERPL-MCNC: 98 MG/DL (ref 65–99)
GLUCOSE UR STRIP-MCNC: NEGATIVE MG/DL
HCT VFR BLD AUTO: 39.4 % (ref 34–46.6)
HGB BLD-MCNC: 12.6 G/DL (ref 12–15.9)
HGB UR QL STRIP.AUTO: ABNORMAL
HYALINE CASTS UR QL AUTO: ABNORMAL /LPF
IMM GRANULOCYTES # BLD AUTO: 0.02 10*3/MM3 (ref 0–0.05)
IMM GRANULOCYTES NFR BLD AUTO: 0.3 % (ref 0–0.5)
KETONES UR QL STRIP: NEGATIVE
LEUKOCYTE ESTERASE UR QL STRIP.AUTO: ABNORMAL
LYMPHOCYTES # BLD AUTO: 1.08 10*3/MM3 (ref 0.7–3.1)
LYMPHOCYTES NFR BLD AUTO: 16 % (ref 19.6–45.3)
MAGNESIUM SERPL-MCNC: 1.9 MG/DL (ref 1.6–2.4)
MCH RBC QN AUTO: 31.6 PG (ref 26.6–33)
MCHC RBC AUTO-ENTMCNC: 32 G/DL (ref 31.5–35.7)
MCV RBC AUTO: 98.7 FL (ref 79–97)
MONOCYTES # BLD AUTO: 0.7 10*3/MM3 (ref 0.1–0.9)
MONOCYTES NFR BLD AUTO: 10.4 % (ref 5–12)
NEUTROPHILS NFR BLD AUTO: 4.73 10*3/MM3 (ref 1.7–7)
NEUTROPHILS NFR BLD AUTO: 69.9 % (ref 42.7–76)
NITRITE UR QL STRIP: POSITIVE
NRBC BLD AUTO-RTO: 0 /100 WBC (ref 0–0.2)
PH UR STRIP.AUTO: 7 [PH] (ref 5–8)
PHOSPHATE SERPL-MCNC: 3.5 MG/DL (ref 2.5–4.5)
PLATELET # BLD AUTO: 272 10*3/MM3 (ref 140–450)
PMV BLD AUTO: 10 FL (ref 6–12)
POTASSIUM SERPL-SCNC: 4.7 MMOL/L (ref 3.5–5.2)
PROT SERPL-MCNC: 7.3 G/DL (ref 6–8.5)
PROT UR QL STRIP: NEGATIVE
RBC # BLD AUTO: 3.99 10*6/MM3 (ref 3.77–5.28)
RBC # UR: ABNORMAL /HPF
REF LAB TEST METHOD: ABNORMAL
SODIUM SERPL-SCNC: 141 MMOL/L (ref 136–145)
SP GR UR STRIP: 1.01 (ref 1–1.03)
SQUAMOUS #/AREA URNS HPF: ABNORMAL /HPF
UROBILINOGEN UR QL STRIP: ABNORMAL
WBC # BLD AUTO: 6.76 10*3/MM3 (ref 3.4–10.8)
WBC UR QL AUTO: ABNORMAL /HPF

## 2021-11-15 PROCEDURE — 87086 URINE CULTURE/COLONY COUNT: CPT | Performed by: INTERNAL MEDICINE

## 2021-11-15 PROCEDURE — 87088 URINE BACTERIA CULTURE: CPT | Performed by: INTERNAL MEDICINE

## 2021-11-15 PROCEDURE — 36415 COLL VENOUS BLD VENIPUNCTURE: CPT

## 2021-11-15 PROCEDURE — 96372 THER/PROPH/DIAG INJ SC/IM: CPT

## 2021-11-15 PROCEDURE — 81001 URINALYSIS AUTO W/SCOPE: CPT | Performed by: INTERNAL MEDICINE

## 2021-11-15 PROCEDURE — 80053 COMPREHEN METABOLIC PANEL: CPT | Performed by: INTERNAL MEDICINE

## 2021-11-15 PROCEDURE — 83735 ASSAY OF MAGNESIUM: CPT | Performed by: INTERNAL MEDICINE

## 2021-11-15 PROCEDURE — 84100 ASSAY OF PHOSPHORUS: CPT | Performed by: INTERNAL MEDICINE

## 2021-11-15 PROCEDURE — 87186 SC STD MICRODIL/AGAR DIL: CPT | Performed by: INTERNAL MEDICINE

## 2021-11-15 PROCEDURE — 25010000002 DENOSUMAB 120 MG/1.7ML SOLUTION: Performed by: INTERNAL MEDICINE

## 2021-11-15 PROCEDURE — 99215 OFFICE O/P EST HI 40 MIN: CPT | Performed by: INTERNAL MEDICINE

## 2021-11-15 PROCEDURE — 85025 COMPLETE CBC W/AUTO DIFF WBC: CPT | Performed by: INTERNAL MEDICINE

## 2021-11-15 RX ORDER — PHENAZOPYRIDINE HYDROCHLORIDE 200 MG/1
200 TABLET, FILM COATED ORAL 3 TIMES DAILY PRN
Qty: 12 TABLET | Refills: 0 | Status: SHIPPED | OUTPATIENT
Start: 2021-11-15

## 2021-11-15 RX ORDER — GABAPENTIN 300 MG/1
CAPSULE ORAL
Qty: 180 CAPSULE | Refills: 3 | Status: SHIPPED | OUTPATIENT
Start: 2021-11-15 | End: 2022-04-26

## 2021-11-15 RX ORDER — SUCRALFATE ORAL 1 G/10ML
1 SUSPENSION ORAL 4 TIMES DAILY
Qty: 420 ML | Refills: 2 | Status: SHIPPED | OUTPATIENT
Start: 2021-11-15 | End: 2022-01-10 | Stop reason: SDUPTHER

## 2021-11-15 RX ADMIN — DENOSUMAB 120 MG: 120 INJECTION SUBCUTANEOUS at 12:06

## 2021-11-16 ENCOUNTER — TELEPHONE (OUTPATIENT)
Dept: GENERAL RADIOLOGY | Facility: HOSPITAL | Age: 61
End: 2021-11-16

## 2021-11-16 LAB
HBA1C MFR BLD: 5.8 % (ref 4.8–5.6)
Lab: NORMAL
WRITTEN AUTHORIZATION: NORMAL

## 2021-11-16 NOTE — TELEPHONE ENCOUNTER
----- Message from Fang Hansen RN sent at 11/15/2021  1:10 PM EST -----  Done  ----- Message -----  From: Amy Brown  Sent: 11/15/2021   1:01 PM EST  To: Fang Hansen RN    PLEASE ADD A REFERRAL SO I CAN DOCUMENT THE REFERRAL FOR THE PT AS THE PT WILL BE SEEING THE NP ON Thursday THIS WEEK AS DR CERVANTES IS BOOKED OUT TILL MARCH     THANK YOU   AMY

## 2021-11-17 ENCOUNTER — TELEPHONE (OUTPATIENT)
Dept: ONCOLOGY | Facility: CLINIC | Age: 61
End: 2021-11-17

## 2021-11-17 LAB — BACTERIA SPEC AEROBE CULT: ABNORMAL

## 2021-11-17 RX ORDER — NITROFURANTOIN 25; 75 MG/1; MG/1
100 CAPSULE ORAL 2 TIMES DAILY
Qty: 14 CAPSULE | Refills: 0 | Status: SHIPPED | OUTPATIENT
Start: 2021-11-17 | End: 2022-08-08

## 2021-11-17 NOTE — TELEPHONE ENCOUNTER
Phoned patient to let her know her urine culture indicated a urinary tract infection. Macrobid 100 mg BID x7 days ordered by Dr. Hancock. Advised patient of prescription, reviewed dosage and frequency, and confirmed preferred pharmacy. Patient v/u.

## 2021-11-18 ENCOUNTER — OFFICE (AMBULATORY)
Dept: URBAN - METROPOLITAN AREA CLINIC 75 | Facility: CLINIC | Age: 61
End: 2021-11-18

## 2021-11-18 VITALS
HEART RATE: 72 BPM | RESPIRATION RATE: 18 BRPM | SYSTOLIC BLOOD PRESSURE: 128 MMHG | WEIGHT: 187 LBS | HEIGHT: 61 IN | DIASTOLIC BLOOD PRESSURE: 64 MMHG

## 2021-11-18 DIAGNOSIS — K21.9 GASTRO-ESOPHAGEAL REFLUX DISEASE WITHOUT ESOPHAGITIS: ICD-10-CM

## 2021-11-18 PROBLEM — Z86.010 SURVEILLANCE DUE TO PRIOR COLONIC NEOPLASIA: Status: ACTIVE | Noted: 2020-02-07

## 2021-11-18 PROBLEM — Z86.010 PERSONAL HISTORY OF COLONIC POLYPS: Status: ACTIVE | Noted: 2020-02-07

## 2021-11-18 PROCEDURE — 99214 OFFICE O/P EST MOD 30 MIN: CPT | Performed by: NURSE PRACTITIONER

## 2021-11-18 RX ORDER — OMEPRAZOLE 40 MG/1
80 CAPSULE, DELAYED RELEASE ORAL
Qty: 180 | Refills: 1 | Status: ACTIVE
Start: 2021-11-18

## 2021-11-19 ENCOUNTER — OFFICE VISIT (OUTPATIENT)
Dept: INTERNAL MEDICINE | Facility: CLINIC | Age: 61
End: 2021-11-19

## 2021-11-19 VITALS
BODY MASS INDEX: 34.41 KG/M2 | SYSTOLIC BLOOD PRESSURE: 136 MMHG | WEIGHT: 187 LBS | HEART RATE: 88 BPM | DIASTOLIC BLOOD PRESSURE: 80 MMHG | HEIGHT: 62 IN

## 2021-11-19 DIAGNOSIS — R05.9 COUGH: ICD-10-CM

## 2021-11-19 DIAGNOSIS — K21.9 GASTROESOPHAGEAL REFLUX DISEASE, UNSPECIFIED WHETHER ESOPHAGITIS PRESENT: Primary | ICD-10-CM

## 2021-11-19 DIAGNOSIS — I10 HYPERTENSION, UNSPECIFIED TYPE: ICD-10-CM

## 2021-11-19 DIAGNOSIS — C79.51 BONE METASTASES: ICD-10-CM

## 2021-11-19 DIAGNOSIS — R45.89 DEPRESSED MOOD: ICD-10-CM

## 2021-11-19 PROCEDURE — 99214 OFFICE O/P EST MOD 30 MIN: CPT | Performed by: INTERNAL MEDICINE

## 2021-11-19 RX ORDER — AZELASTINE 1 MG/ML
2 SPRAY, METERED NASAL 2 TIMES DAILY
Qty: 30 ML | Refills: 12 | Status: SHIPPED | OUTPATIENT
Start: 2021-11-19

## 2021-11-19 NOTE — PROGRESS NOTES
"Chief Complaint   Patient presents with   • Hypertension   • Hyperlipidemia   • GI Problem   • breast cancer       History of Present Illness   Martha Davey is a 60 y.o. female presents for follow up evaluation. She has some increased issues with reflux. She is elevating the head of her bed. She was seen by gi yesterday. Famotidine increased to 40 mg daily. She can aspirate at night and then develop a cough. She has had a cough for at least 6 months. Has metastatic breast cancer. She had CT chest abdomen and pelvis. Noted to have  stable nodules of the lung. Left upper extremity pain. She takes tramadol prn for this \"when its really bad\".   On macrobid for a recent UTI. She reports limited water intake of about 24 ounces daily  Has not recently tested bp at home.   Patient has some depressed mood related to her illness as well as spouse recently w/ health struggles. She is taking duloxetine 30 mg daily. She is also on provigil w/ benefit for energy.           The following portions of the patient's history were reviewed and updated as appropriate: allergies, current medications, past family history, past medical history, past social history, past surgical history and problem list.  Current Outpatient Medications on File Prior to Visit   Medication Sig Dispense Refill   • ALPRAZolam (Xanax) 0.25 MG tablet Take 1 tablet by mouth 2 (Two) Times a Day As Needed for Anxiety. 60 tablet 0   • calcium carbonate (OS-CARY) 600 MG tablet Take 600 mg by mouth 2 (Two) Times a Day With Meals.     • capecitabine (Xeloda) 500 MG chemo tablet Take 2 tablets (1000 mg) by mouth in the morning, and 3 tablets (1500 mg) in the evening. Take for 7 days on, followed by 7 days off. 70 tablet 3   • cholecalciferol (VITAMIN D3) 1000 units tablet Take 1,000 Units by mouth Daily.     • Cyanocobalamin ER 1000 MCG tablet controlled-release Take 1 tablet by mouth Daily.     • diphenoxylate-atropine (LOMOTIL) 2.5-0.025 MG per tablet Take 1 tablet " by mouth 4 (Four) Times a Day As Needed for Diarrhea. 60 tablet 0   • DULoxetine (CYMBALTA) 30 MG capsule TAKE 1 CAPSULE BY MOUTH  TWICE DAILY 180 capsule 3   • Eliquis 5 MG tablet tablet TAKE 1 TABLET BY MOUTH  EVERY 12 HOURS 180 tablet 1   • FLONASE ALLERGY RELIEF 50 MCG/ACT nasal spray 2 sprays into each nostril daily.  11   • gabapentin (NEURONTIN) 300 MG capsule TAKE 1 CAPSULE BY MOUTH  TWICE DAILY 180 capsule 3   • Hylan (SYNVISC) 16 MG/2ML solution prefilled syringe injection Inject 16 mg into the appropriate joint as directed by provider As Needed.     • losartan (COZAAR) 50 MG tablet TAKE 1 TABLET BY MOUTH  DAILY 90 tablet 3   • mesalamine (LIALDA) 1.2 g EC tablet TAKE 1 TABLET BY MOUTH  TWICE DAILY 180 tablet 3   • metaxalone (Skelaxin) 800 MG tablet Take 1 tablet by mouth 3 (Three) Times a Day As Needed for Muscle Spasms. 30 tablet 1   • modafinil (PROVIGIL) 200 MG tablet TAKE ONE (1) TABLET BY MOUTH DAILY.  30 tablet 0   • Multiple Vitamins-Minerals (Multivitamin Gummies Womens) chewable tablet Chew 1 tablet Daily.     • nitrofurantoin, macrocrystal-monohydrate, (Macrobid) 100 MG capsule Take 1 capsule by mouth 2 (Two) Times a Day. 14 capsule 0   • omeprazole (PriLOSEC) 40 MG capsule TAKE 1 CAPSULE DAILY 90 capsule 2   • ondansetron ODT (ZOFRAN-ODT) 8 MG disintegrating tablet Take 1 tablet by mouth Every 8 (Eight) Hours As Needed for Nausea or Vomiting. 30 tablet 1   • phenazopyridine (Pyridium) 200 MG tablet Take 1 tablet by mouth 3 (Three) Times a Day As Needed for Bladder Spasms. 12 tablet 0   • prochlorperazine (COMPAZINE) 10 MG tablet Take 1 tablet by mouth Every 6 (Six) Hours As Needed for Nausea or Vomiting. 30 tablet 0   • pseudoephedrine (Sudafed) 30 MG tablet Take 1 tablet by mouth Every 6 (Six) Hours As Needed for Congestion. 20 tablet 0   • sennosides-docusate sodium (SENOKOT-S) 8.6-50 MG tablet Take 2 tablets by mouth 2 (Two) Times a Day. 90 tablet 2   • sucralfate (Carafate) 1 GM/10ML  suspension Take 10 mL by mouth 4 (Four) Times a Day. 420 mL 2   • temazepam (RESTORIL) 15 MG capsule Take 1 capsule by mouth At Night As Needed for Sleep. 90 capsule 0   • traMADol (ULTRAM) 50 MG tablet TAKE 1 TABLET BY MOUTH  EVERY 8 HOURS AS NEEDED FOR MODERATE PAIN 90 tablet 1   • acetaminophen (TYLENOL) 500 MG tablet Take 500 mg by mouth Every 6 (Six) Hours As Needed for Mild Pain .     • Diclofenac Sodium (VOLTAREN) 1 % gel gel Place 4 g on the skin as directed by provider.     • triamcinolone (KENALOG) 0.1 % cream Apply  topically to the appropriate area as directed 2 (Two) Times a Day. 15 g 0   • urea (CARMOL) 10 % cream Apply  topically to the appropriate area as directed 3 (Three) Times a Day. 453 g 1     No current facility-administered medications on file prior to visit.     Review of Systems   Constitutional: Positive for fatigue.   HENT: Negative.    Eyes: Negative.    Respiratory: Negative.    Cardiovascular: Negative.    Gastrointestinal: Negative.    Endocrine: Negative.    Genitourinary: Negative.    Musculoskeletal: Positive for arthralgias.        Arm pain     Skin: Negative.    Allergic/Immunologic: Negative.    Neurological:        Chronic leg weakness related to charcot ambika tooth   Hematological: Negative.    Psychiatric/Behavioral:        Depressed mood       Objective   Physical Exam  Vitals and nursing note reviewed.   Constitutional:       Appearance: Normal appearance.   HENT:      Head: Normocephalic and atraumatic.      Right Ear: Tympanic membrane normal.      Left Ear: Tympanic membrane normal.      Nose: Nose normal.      Mouth/Throat:      Mouth: Mucous membranes are moist.   Eyes:      Extraocular Movements: Extraocular movements intact.      Pupils: Pupils are equal, round, and reactive to light.   Cardiovascular:      Rate and Rhythm: Normal rate and regular rhythm.      Pulses: Normal pulses.      Heart sounds: Normal heart sounds.   Pulmonary:      Effort: Pulmonary effort is  "normal.      Breath sounds: Normal breath sounds.   Abdominal:      General: Abdomen is flat.      Palpations: Abdomen is soft.   Musculoskeletal:         General: Normal range of motion.      Cervical back: Normal range of motion.   Skin:     General: Skin is warm and dry.      Comments: Chemotherapy related skin changes hands B   Neurological:      General: No focal deficit present.      Mental Status: She is alert and oriented to person, place, and time.   Psychiatric:         Mood and Affect: Mood normal.         Behavior: Behavior normal.         Thought Content: Thought content normal.         Judgment: Judgment normal.          /80   Pulse 88   Ht 156.2 cm (61.5\")   Wt 84.8 kg (187 lb)   LMP  (LMP Unknown)   BMI 34.76 kg/m²     Assessment/Plan   Diagnoses and all orders for this visit:    Gastroesophageal reflux disease, unspecified whether esophagitis present    Bone metastases (HCC)    Cough    Hypertension, unspecified type    Depressed mood      Patient w/ persistent cough. CT chest reviewed. No infection. Suspect largely related to reflux. She is to start ranitidine 40 mg at night. She will end evening meal earlier in the evening. Will also try astelin at night as well. Discussed ANDREW protocol. She may take tramadol daily for arm pain. She will f/u w/ her therapist routinely for mood. She is also to plan a trip. She will follow up here in office in 6 months or prn.             "

## 2021-12-02 ENCOUNTER — OFFICE VISIT (OUTPATIENT)
Dept: PSYCHIATRY | Facility: HOSPITAL | Age: 61
End: 2021-12-02

## 2021-12-02 DIAGNOSIS — R53.0 NEOPLASTIC MALIGNANT RELATED FATIGUE: ICD-10-CM

## 2021-12-02 DIAGNOSIS — F33.1 MAJOR DEPRESSIVE DISORDER, RECURRENT EPISODE, MODERATE (HCC): Primary | ICD-10-CM

## 2021-12-02 DIAGNOSIS — F41.1 GENERALIZED ANXIETY DISORDER: ICD-10-CM

## 2021-12-02 PROCEDURE — 99214 OFFICE O/P EST MOD 30 MIN: CPT | Performed by: NURSE PRACTITIONER

## 2021-12-02 NOTE — PROGRESS NOTES
[facility]  In Person Session    Subjective  Patient ID: Martha Davey is a 61 y.o. female is seen face to face in the [facility].    CC: Caregiver distress, anxiety    HPI: Pt noted to be alert, oriented, and engaged in conversation. Full range of affect noted; mood reportedly overwhelmed and anxious.   Interval history reviewed; pt voices increased stress alongside diagnosis of cancer in . Unfortunately, this has exacerbated stress in marriage with persistent feelings of neglect. Pt voices appreciation of friends who had her over to celebrate her birthday, unfortunately with continued complications in family and social realms outside of this. Pt has connected to behavioral health [place] with appointment scheduled next week; unfortunately, she has to reschedule this visit. Pt reviews increased dedication to self care opportunities, walking regularly, and appreciates time to herself. Sleep remains disrupted at times, although generally improved. Pt does identify reduced desire to get up and engage. Reports getting up and going appx 9:30 AM.  Reviewed relationship with close friends as being enjoyable and motivating, specifically identifying grief of reduced contact in pandemic.     Review of Systems   Psychiatric/Behavioral: Positive for dysphoric mood. The patient is nervous/anxious.      Medications Reviewed:  Cymbalta 30 mg twice daily    Diagnoses and all orders for this visit:    1. Major depressive disorder, recurrent episode, moderate (HCC) (Primary)    2. Generalized anxiety disorder    3. Neoplastic malignant related fatigue    Plan of Care  Patient continues with significant demoralization and difficulties managing home situation.  Has connected with behavioral health [place], with appointment scheduled for next week.  Supported patient in dedication to individual therapy.  At present, meds seem stable and current symptoms of anxiety and depression  seem stable, despite continued impact of distress. Reiterated importance of counseling to assist with allowing self space to process events, identify goals of self care, and assist with communication barriers and cognitive distortions.  Counseling continued surrounding caregiver stress, opportunities available for engagement, and continued focus on self-care.  Pt aware I remain available for both she and her , as his situation develops. Follow-up arranged in clinic in 3 months.    I spent 35 minutes caring for Martha on this date of service. This time includes time spent by me in the following activities: preparing for the visit, obtaining and/or reviewing a separately obtained history, performing a medically appropriate examination and/or evaluation, counseling and educating the patient/family/caregiver, ordering medications, tests, or procedures and documenting information in the medical record.

## 2021-12-08 ENCOUNTER — SPECIALTY PHARMACY (OUTPATIENT)
Dept: PHARMACY | Facility: HOSPITAL | Age: 61
End: 2021-12-08

## 2021-12-08 NOTE — PROGRESS NOTES
"Specialty Pharmacy Refill Coordination Note     Martha is a 61 y.o. female contacted today regarding refills of  Xeloda specialty medication(s).    Reviewed and verified with patient:      Specialty medication(s) and dose(s) confirmed: yes    Refill Questions      Most Recent Value   Changes to allergies? No   Changes to medications? No   New conditions since last clinic visit No   Unplanned office visit, urgent care, ED, or hospital admission in the last 4 weeks  No   How does patient/caregiver feel medication is working? Good   How many doses of your specialty medications were missed in the last 4 weeks? 1- she missed one night's dose and then took 3 tabs in the morning for the next 3 mornings to make up for it.   Why were doses missed? she forgot- she went out to eat and it threw off her schedule.          Delivery Questions      Most Recent Value   Delivery method [employer]  [[employer] Priority- sig required. ring door bell- dogs- afternoon delivery- ship out 12/9 deliver 12/10]   Delivery address correct? Yes   Preferred delivery time? PM   Number of medications in delivery 1   Medication being filled and delivered Xeloda   Doses left of specialty medications 1 week   Questions or concerns for the pharmacist? Yes   Explain any questions or concerns for the pharmacist Wants to know if she messed up when she did the \"making up\" of pills- so she won't do it again.                 Follow-up: 3 week(s)     Ami Hudson  Specialty Pharmacy Technician      "

## 2021-12-12 NOTE — PROGRESS NOTES
Chief Complaint  Previous Stage Ib ([id number]) ER/NC positive, HER-2/peter negative right breast cancer with subsequent metastatic disease identified 10/8/2017, history of right pulmonary embolism, cancer related pain, chemotherapy-induced diarrhea, chemotherapy-induced mucositis, chemotherapy-induced hand-foot syndrome    Subjective        History of Present Illness  The patient returns today for follow-up and evaluation prior to next monthly Xgeva.  She continues on Xeloda 1000 mg in the morning, 1500 mg in the evening, 7 days on, 7 days off.  She also continues to receive monthly Xgeva and is due for this today.  She also continues on Eliquis 5 mg twice daily, reports being compliant with this, and denies any signs or symptoms of bleeding.      Unfortunately, last week her  was diagnosed with cancer.  She reports they stayed very busy with several doctors appointments because of this.    Today, she started her first week back on Xeloda.  Skin thickening on her hands and feet has remained stable.  She continues to use emollient creams frequently, with some improvement.  She continues to experience skin peeling, with improved redness.  Typically she walks 4 times per week, however last week did not have any time to do this.  She notes improvement with her eyes since utilizing a nightly gel.  Her pain remains stable, utilizing tramadol almost nightly.  She does note some tenderness in her left breast.  She noticed this a few days ago, and has continued to touch this area frequently since then.    She had follow-up with urogynecology on 12/7/2021.  They started her on low-dose Macrobid daily.  She has noticed significant improvement with this.    She saw gastroenterology a few weeks ago.  She reports they plan to schedule her for EGD and colonoscopy, and is awaiting an appointment.  She would prefer to have this done in February as she believes her  will need surgery for his newly diagnosed cancer soon.  " She continues on Prilosec 20 mg daily.    Objective   Vital Signs:   /70   Pulse 94   Temp 97.5 °F (36.4 °C) (Temporal)   Resp 20   Ht 156 cm (61.42\")   Wt 85.7 kg (189 lb)   SpO2 96%   BMI 35.23 kg/m²       Physical Exam  Constitutional:       Appearance: She is well-developed.   Eyes:      Conjunctiva/sclera: Conjunctivae normal.   Neck:      Thyroid: No thyromegaly.   Cardiovascular:      Rate and Rhythm: Normal rate and regular rhythm.      Heart sounds: No murmur heard.  No friction rub. No gallop.    Pulmonary:      Effort: No respiratory distress.      Breath sounds: Normal breath sounds.   Chest:   Breasts:      Right: No supraclavicular adenopathy.      Left: No supraclavicular adenopathy.       Abdominal:      General: Bowel sounds are normal. There is no distension.      Palpations: Abdomen is soft.      Tenderness: There is no abdominal tenderness.   Lymphadenopathy:      Head:      Right side of head: No submandibular adenopathy.      Cervical: No cervical adenopathy.      Upper Body:      Right upper body: No supraclavicular adenopathy.      Left upper body: No supraclavicular adenopathy.   Skin:     General: Skin is warm and dry.      Findings: Rash present.      Comments: There remains palmar erythema with skin cracking and peeling, which is stable. The extent onto the dorsal aspect of the hands has improved significantly. The degree of sclerodactyly has improved with reduced skin thickening in the hands in general and improve mobility of the fingers.    Neurological:      Mental Status: She is alert and oriented to person, place, and time.      Cranial Nerves: No cranial nerve deficit.      Motor: No abnormal muscle tone.      Deep Tendon Reflexes: Reflexes normal.   Psychiatric:         Behavior: Behavior normal.        BREAST: Due to the patient complaining of left-sided breast tenderness today, breast exam was performed.  No masses or irregularities noted to the left breast.  " Patient reported tenderness at the 8 o'clock position.  Right breast status post mastectomy with implant in place.  No abnormalities felt.  No cervical or axillary lymphadenopathy noted bilaterally.    Result Review : Reviewed CBC, CMP, magnesium, phosphorus from today.      Assessment and Plan     1. Previous Stage Ib ([id number]) right breast cancer:  · Diagnosed May 2010 with excisional biopsy for invasive ductal carcinoma, 1.3 cm, grade 2, ER 90%, OH 80%, HER-2/peter negative (1+ IHC).    · Subsequent right mastectomy in July 2010 with no residual breast malignancy, 1/5 sentinel lymph node with micrometastasis (0.25 mm).    · Treated in the [facility] system with adjuvant AC ×4 cycles in 2010 (no taxanes administered due to underlying Charcot-Saloni-Tooth with peripheral neuropathy).    · Adjuvant Femara (postmenopausal) initiated October 2010 with plan to treat ×10 years.    · Genetic testing reportedly negative.    · Developed osteopenia treated with Prolia beginning 2/27/13. Subsequently discontinued due to identification of metastatic disease.  2. Recurrent/metastatic disease identified 10/8/17:  · Disease involving thoracic spine with cord compression at T6, lumbosacral involvement, sternal and right sternoclavicular involvement.    · Femara discontinued in 10/2017.    · Radiation administered (in the [facility] system) to the thoracic spine beginning 10/19/17 treating T3-T9 to a dose of 24 gray in 6 fractions.  · Evaluation with MRI 12/8/17 showing persistent T6 cord compression with persistent neurologic compromise requiring surgical treatment 12/11/17 with T6 laminectomy/corpectomy and T3-T9 fusion.  Pathology with metastatic carcinoma of breast origin, ER negative, OH negative, HER-2/peter negative (1+ IHC).  · Additional staging evaluation 12/8/17 with no evidence of visceral metastatic disease, bone scan showing involvement of thoracic spine, sternum, left humerus, mid frontal bone.  No plane film correlate of  left humerus lesion.  MRI lumbar spine with small intradural L3 metastasis.  CA 15-3 12/6/17- 17.  · Palliative radiation therapy to L3 dural metastasis and left humerus initiated 1/15/18 (10 fractions), completed 1/26/18.  · Hypercalcemia of malignancy with calcium in the 10-11 range.  · Initiation of monthly Xgeva 1/23/18.  · Baseline CT scan 1/30/18 with no evidence of visceral involvement.  Cluster of nodular opacities in the right lower lobe suspected to be infectious or related to bronchiolitis. Bone scan 1/30/18 showed postsurgical change in the thoracic spine, stable uptake in the frontal bone, no new areas of disease.  · Initiation of palliative oral single agent Xeloda 2/7/18 2000 mg a.m., 1500 mg p.m. for 14/21 days.   · Following 3 cycles xeloda, bone scan 4/4/18 showed no change from the prior study.  CT scan 4/4/18 showed a small pericardial effusion of unclear significance as well as a subcutaneous nodule in the right lateral chest wall.  Subsequent evaluation with echocardiogram 4/17/18 showed no evidence of pericardial effusion.  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  We discussed the possibility of surgical excision of the right subcutaneous chest wall lesion for more definitive diagnosis.  Reviewed previous CT images dating back to 12/8/17 and the lesion was present even at that time measuring around 1.7 cm although not commented on in the radiology report.  As this appears to be an indolent low-grade process unrelated to her breast cancer, recommendee foregoing surgical excision at this time and monitoring this area on future scans.  The patient agreed.    · Following 6 cycles of Xeloda, CT 6/6/18  showed stable findings, no evidence of progressive disease.  There was a comment regarding subcutaneous abnormality in the anterior abdominal wall and  this was related to Lovenox injection sites.  Bone scan 6/6/18 showed no interval change.   · CT scan and bone scan 8/13/18 following 9 cycles of Xeloda showed stable findings with no evidence of significant visceral metastases.  Her bone lesions appear stable on bone scan.  The spindle cell neoplasm in her right chest wall actually decreased in size from 2 cm down to 1.6 cm.    · The patient experienced some symptoms of diarrhea, anorexia, generalized weakness during cycle 9 Xeloda it was unclear whether this was related to a viral gastroenteritis or toxicity from treatment.  Symptoms recurred during cycle 10 and treatment was cut short by 2 days.  Symptoms attributed to Xeloda.  With cycle 11, dose and schedule altered to 1500 mg twice daily for 7 days on followed by 7 days off .  · CT scan 9/9/2020 with no significant changes.  Bone scan 9/9/2020 read as unchanged from prior studies however did note an area of slight activity in the medial left femur.  Contacted radiology and although this was not noted on prior reports appears to have been present.  Subsequent MRI left femur 9/21/2020 with cortical thickening and periosteal edema left iliac us muscle insertion to the medial left femur with no evidence of metastatic disease, favored to represent periosteal change secondary to insertional tendinitis.  · Severe hand-foot symptoms causing sclerodactyly and limitation in finger movement prompted change in dosing in July 2021 with Xeloda decreased to 1000 mg a.m., 1500 mg p.m. for 7 days on followed by 7 days off.  · Most recent 3-month interval scans from 11/8/2021 with CT chest abdomen pelvis, bone scan performed.  CT scans showed some lobulated nodular opacity in cluster of nodules in the right lower lobe (felt to be infectious/inflammatory), no other changes.  Bone scan was stable.  · The patient has CT scans and bone scan to review from 11/8/2021 at a 3-month interval as noted above.  Fortunately, scan showed no  evidence of progressive disease, do show decrease in some small nodular opacities in the right lower lobe that are felt to be infectious/inflammatory.  Suspect that these are related to prior aspiration from her significant reflux issues.  The patient has experienced some chronic side effects from Xeloda, mainly related to hand-foot syndrome and ocular dryness.  Her hand-foot symptoms have improved since we modified her Xeloda dose, however do remain significant.  She continues to use emollient creams frequently and uses urea and triamcinolone as needed.  There has been some improvement today on exam with decrease in the degree of skin thickening and sclerodactyly and an increase in movement of her fingers.  There has been a decrease in the amount of erythema extending onto the dorsal aspect of her hands.  In regards to her dry eyes, she is seeing ophthalmology and is using an ocular lubricant at night which has helped significantly and does use artificial tears during the day.  She feels that symptoms are currently tolerable in this regard as well.  We will continue on with her treatment as planned with Xeloda and she will receive monthly Xgeva today.    · 12/13/2021, patient continues on Xeloda 1000 mg in the morning, 1500 mg in the evening.  7 days on 7 days off.  She started her on week today.  Skin thickening and erythema to the hands and feet remained stable and patient feels are tolerable.  She continues to utilize emollient creams frequently.  She has noticed significant improvement with her eyes since utilizing a nightly gel.  Creatinine today 1.11, BUN 26.  Discussed adequate oral hydration with the fluid, and she admits that she does not drink much throughout the day.  She will try to focus on increasing fluid intake.  She also continues to receive monthly Xgeva and is due for this today.  She is scheduled for repeat scans in 7 weeks.   3. Right pulmonary embolism:  · Diagnosed on CT angiogram 10/21/17  involving small right lower lobe pulmonary artery.  Lower extremity Dopplers negative.  · Bilateral lower extremity Dopplers negative again 12/5/17.  · Received chronic Lovenox 1 mg/kg twice per day, transition to oral Eliquis in February 2019, continuing on Eliquis 5 mg twice daily.  · The patient has had no bleeding difficulties on anticoagulation  4. Cancer related pain:  · Previously receiving Duragesic 50 µg patch every 72 hours along with Dilaudid 4 mg as needed for breakthrough pain  · The patient's pain improved over time and she was able to discontinue both Duragesic and Dilaudid in the interval.  · Patient does take occasional Flexeril at bedtime due to back spasm/pain when she has been more active.  · The patient does have some occasional aggravation of her chronic back pain and does use tramadol 50 mg every 8 hours as needed  · Patient's pain is stable.  She does continue to use tramadol 50 mg every 12 hours as needed which has been effective.  The patient does have chronic difficulty with pain involving her low back, left shoulder.  Physical therapy has helped to produce some relief.    · 12/13/2021, patient reports pain is well controlled with current regimen.  He utilizing tramadol approximately once nightly.  5. Chemotherapy-induced diarrhea with subsequent C. difficile colitis in the setting of previous ulcerative colitis:  · Patient with reported history of ulcerative colitis, is not on active therapy.  · The patient developed initial diarrhea related to Xeloda at regular dosing.  · Symptoms improved on reduced dose Xeloda  · Flare of symptoms in October 2018 with apparent finding of C. difficile colitis by GI, treated with course of oral vancomycin with improvement in symptoms.  · Patient notes minimal intermittent diarrhea/loose stools on Xeloda requiring occasional dosing of Imodium.  She reports that her bowel function recently has been fairly stable.   6. Traumatic left tibia/fibular  fracture:  · Status post ORIF 12/6/17  · Specimen was sent for pathologic review, negative for evidence of malignancy  7. Hypercalcemia:  · Suspect hypercalcemia of malignancy, calcium in  10-11 range previously.  · Calcium normalized following initiation of monthly Xgeva on 1/23/18.   8. Chemotherapy-induced mucositis:  · Patient had a minimal degree of mucositis with cycle 2.  The patient has magic mouthwash to use as needed.  No subsequent mucositis.  9. Recurrent UTI, bladder wall thickening on CT:  · Patient had an enterococcal UTI on 3/2/18 sensitive to nitrofurantoin and received treatment, unclear how long.  · Recurrent UTI 3/20/18 with urine culture growing Klebsiella, initially treated with nitrofurantoin, transitioned to Levaquin.  · CT 4/4/18 with diffuse bladder wall thickening with increased nodular thickening at the left base.  Referral to urogynecology Dr. May Johnson.  She was placed on a prophylactic dose of trimethoprim 100 mg daily, bladder wall thickening felt to be related to recent recurrent urinary tract infections.  · Patient with urinary symptoms, treated with course of Macrobid at the end of December 2018, urine culture however was negative 12/31/18.  · Patient was found to have Klebsiella UTI 7/29/2019 which was successfully treated with Macrobid with complete resolution of symptoms.  · CT 8/10/2021 with diffuse bladder wall thickening new from 5/17/2021 (however seen on multiple prior scans).    · UTI on 10/18/2021 with E. coli greater than 100,000 colonies that was pansensitive, treated with Macrobid x7 days  · Patient 11/15/2021 again reports increasing dysuria and urinary frequency along with malodorous urine.  Urinalysis and culture obtained showing UTI.  Patient was treated with course of Macrobid.    · Patient was seen by urogynecology 12/7/2021 and started on a daily low-dose Macrobid.  10.   Mobility:  · The patient underwent an intensive course of rehabilitation at [facility].  She  graduated from her outpatient course November 2018.  · Overall the patient has improved dramatically in terms of mobility, now walking around 1 mile per day without assistance.  11.  Depression:  · The patient is continuing follow-up in the supportive oncology clinic and is currently continuing on Cymbalta 30 mg twice daily as well as gabapentin 600 mg nightly.  · The patient feels that her depression symptoms are currently fairly well controlled.  She has seen some benefit from attending support groups at [employer] which she plans to continue.  · The patient does continue follow-up in supportive oncology clinic.  12. 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  Symptoms did improve and she continued on the same dose.    · Patient was referred to dermatology and has been continuing on triamcinolone 0.1% cream used for 1 week on followed by 1 week off which led to some further improvement.   · Progressive palmar erythema with development of sclerodactyly and effect on dexterity.  Addition of urea-based cream 3 times daily.  · Patient with continued difficulty regarding erythema of her hands that extended onto the dorsal aspect and was causing contractures/sclerodactyly in her fingers affecting her dexterity.  In July 2021, held Xeloda for an additional week and then reduced dose from 1500 mg twice daily down to 1000 mg a.m. and 1500 mg p.m. and continued on a 7-day on followed by 7-day off schedule.  · With reduction in Xeloda dose, slight improvement in erythema involving dorsal aspect of the hands and slight decrease in sclerodactyly  · 12/13/2021 patient continues to experience cracking, peeling, and erythema to the bilateral hands.  She reports this is stable and has not worsened.  She feels it is tolerable.  She will continue with  frequent application of emollient creams urea and triamcinolone creams as needed.  13. Evidence of steatosis on scans with mild intermittent elevated liver function studies:  · Liver function studies increased 8/20/19 with ALT 98, AST 70, normal total bilirubin.  · Negative viral hepatitis A, B, and C panel 8/23/18  · Likely related to hepatic steatosis.   · Subsequent improvement in LFTs  · Today, LFTs normal   14. Chemotherapy induced leukopenia:  · WBC today  4.71.  15. GERD:  · Patient with significant history of reflux  · Patient currently receiving Prilosec 20 mg twice daily  · Patient feels as if she is having episodes of aspiration at night, may explain recent findings in the right lower lobe on CT scan.  Dr. Hancock had a lengthy discussion with the patient on 11/15/2021 regarding management of her reflux.  Does elevate the head of her bed.  She does drink bourbon at least 3 days/week at night and we discussed that she should stop alcohol for now.  Eating at night exacerbates her symptoms and we discussed avoiding this.  We discussed avoiding caffeine.  Added a prescription back in for Carafate 1 g 4 times daily (had been on this past).  We have asked her also to return to see Dr. Ocasio, her gastroenterologist.   · 12/13/2021, patient reports she is awaiting to hear back from gastroenterology about scheduling EGD/colonoscopy.  She prefers to wait till February to have these performed as her  was recently diagnosed with cancer and will need surgery for this.  16. Insomnia:  · Patient with prior paradoxical reaction to Benadryl  · Improved previously on temazepam 15 mg nightly as needed.   · Patient noted subsequently that temazepam was having no effect.   · Symptoms currently stable on gabapentin 600 mg nightly  17. Health maintenance:  · Patient notes that she has a history of colon polyps as well as ulcerative colitis and was due for a follow-up colonoscopy on 9/12/2019.  We did discuss there is no  necessity to pursue colonoscopy in the setting of her metastatic breast cancer.    · The patient did undergo colonoscopy on 2/7/2020 with findings of muscular hypertrophy and diverticulosis.   18. Right shoulder pain:  · Patient was evaluated by Dr Forrester.  She has experienced difficulty over a long time frame with right shoulder although she has not complained of this in prior visits to our office.  She reported difficulty with abduction.    · The patient did undergo MRI of her right shoulder on 11/21/2019 at an outside facility showing multiple abnormalities including supraspinatus tendinosis, labral tear but no evidence of metastatic disease.  · Patient developed pain in the left shoulder, was seen by orthopedics and underwent steroid injection with some improvement.  Right shoulder is stable currently.  Patient is continuing with physical therapy.  19. Ocular changes in part related to Xeloda:  · Patient experienced a mild degree of blurred vision as well as burning and pruritus.  · She was seen by her ophthalmologist and was placed on xiidra ophthalmic drops which have helped.  · Likely both issues are to some extent related to Xeloda.  · Symptoms did worsen somewhat recently and patient has been seen by a new ophthalmologist at [facility].  She is now using an ocular lubricant at night in addition to artificial tears which have helped.   20. Elevated glucose:  · It is noted the patient's glucose at the last few visits has been in the high 100 range, postprandial.  · She has had a hemoglobin A1c that has been in the high 5-6 range in the past, on 5/1/2019 at 5.7  · Hemoglobin A1c on 5/7/2021 was improved at 5.2  21. Possible loosening of pedicle screw at T3:  · CT cervical spine 5/17/2021 showed loosening of the left pedicle screw at T3.  Patient referred to orthopedics and was seen by Dr. Nayak who felt that there were no concerning findings on the scan  22. COVID-19 vaccination  · Patient  received the Pfizer vaccine, second dose administered on 4/2/2021 without side effects.  · Patient received her third dose COVID-19 injection in August 2021  23. Left breast tenderness  · 12/13/2021, patient reports experiencing tenderness to the left breast at the 8 o'clock position.  Reports this occurred a few days ago, and she continues to touch this area due to concern.  Patient reports she does not feel any irregularities in this breast, just that she noticed it was tender one day.  She feels that since then, she has continued to frequently touch and rub this area, causing her to feel the tenderness.  Breast exam was performed today with no abnormalities noted.  We will continue to monitor this, in the meantime if the patient does notice any other symptoms related to this breast she will contact our office for further evaluation.    Plan:   1. Continue Xeloda 1000 mg a.m., 1500 mg in the p.m. 7 days on followed by 7 days off  2. Monthly Xgeva today  3. Continue Pyridium 200 mg 3 times daily as needed, refill provided  4. Continue Eliquis 5 mg twice daily.  5. Continue use of emollient cream frequently and urea-based cream and triamcinolone as needed on the hands and feet and continue to wear socks and gloves at night .  6. Continue omeprazole 20 mg twice daily  7. Continue Carafate 1 g 4 times daily  8. Patient was advised to stop alcohol use, avoid eating late at night, decrease caffeine intake in regards to her reflux  9. Patient is awaiting appointment for EGD/colonoscopy from gastroenterology.  10. Continue ocular lubricating gel nightly per ophthalmology  11. Continue Provigil 100 mg daily and Cymbalta 30 mg twice daily per supportive oncology clinic.  Patient continues routine follow-up with supportive oncology.  12. Continue low-dose daily Macrobid per urogynecology.  13. In 4 weeks MD visit with CBC, CMP, magnesium, phosphorus and monthly Xgeva  14. In 7 weeks CT chest abdomen pelvis and bone  scan  15. In 8 weeks MD visit with CBC, CMP, magnesium, phosphorus and monthly Xgeva. We will review scan results at that time.  16. Increase oral fluid intake.  17.      Patient continuing on high risk medication requiring intensive monitoring.              I spent 45 minutes caring for Martha on this date of service. This time includes time spent by me in the following activities: preparing for the visit, reviewing tests, obtaining and/or reviewing a separately obtained history, performing a medically appropriate examination and/or evaluation, counseling and educating the patient/family/caregiver, documenting information in the medical record and care coordination.     Reva Galo, APRN  12/13/2021

## 2021-12-13 ENCOUNTER — LAB (OUTPATIENT)
Dept: OTHER | Facility: HOSPITAL | Age: 61
End: 2021-12-13

## 2021-12-13 ENCOUNTER — INFUSION (OUTPATIENT)
Dept: ONCOLOGY | Facility: HOSPITAL | Age: 61
End: 2021-12-13

## 2021-12-13 ENCOUNTER — OFFICE VISIT (OUTPATIENT)
Dept: ONCOLOGY | Facility: CLINIC | Age: 61
End: 2021-12-13

## 2021-12-13 VITALS
BODY MASS INDEX: 35.68 KG/M2 | HEIGHT: 61 IN | HEART RATE: 94 BPM | TEMPERATURE: 97.5 F | OXYGEN SATURATION: 96 % | RESPIRATION RATE: 20 BRPM | SYSTOLIC BLOOD PRESSURE: 114 MMHG | WEIGHT: 189 LBS | DIASTOLIC BLOOD PRESSURE: 70 MMHG

## 2021-12-13 DIAGNOSIS — G89.3 CANCER ASSOCIATED PAIN: ICD-10-CM

## 2021-12-13 DIAGNOSIS — Z17.1 MALIGNANT NEOPLASM OF OVERLAPPING SITES OF RIGHT BREAST IN FEMALE, ESTROGEN RECEPTOR NEGATIVE (HCC): ICD-10-CM

## 2021-12-13 DIAGNOSIS — Z79.899 HIGH RISK MEDICATION USE: ICD-10-CM

## 2021-12-13 DIAGNOSIS — C50.811 MALIGNANT NEOPLASM OF OVERLAPPING SITES OF RIGHT BREAST IN FEMALE, ESTROGEN RECEPTOR NEGATIVE (HCC): ICD-10-CM

## 2021-12-13 DIAGNOSIS — C79.51 BONE METASTASES: ICD-10-CM

## 2021-12-13 DIAGNOSIS — C50.811 MALIGNANT NEOPLASM OF OVERLAPPING SITES OF RIGHT BREAST IN FEMALE, ESTROGEN RECEPTOR NEGATIVE (HCC): Primary | ICD-10-CM

## 2021-12-13 DIAGNOSIS — L27.1 PALMAR PLANTAR ERYTHRODYSAESTHESIA DUE TO CYTOTOXIC THERAPY: ICD-10-CM

## 2021-12-13 DIAGNOSIS — K21.9 GASTROESOPHAGEAL REFLUX DISEASE, UNSPECIFIED WHETHER ESOPHAGITIS PRESENT: ICD-10-CM

## 2021-12-13 DIAGNOSIS — Z79.01 ANTICOAGULANT LONG-TERM USE: ICD-10-CM

## 2021-12-13 DIAGNOSIS — Z17.1 MALIGNANT NEOPLASM OF OVERLAPPING SITES OF RIGHT BREAST IN FEMALE, ESTROGEN RECEPTOR NEGATIVE (HCC): Primary | ICD-10-CM

## 2021-12-13 LAB
ALBUMIN SERPL-MCNC: 4.2 G/DL (ref 3.5–5.2)
ALBUMIN/GLOB SERPL: 1.6 G/DL
ALP SERPL-CCNC: 68 U/L (ref 39–117)
ALT SERPL W P-5'-P-CCNC: 14 U/L (ref 1–33)
ANION GAP SERPL CALCULATED.3IONS-SCNC: 8 MMOL/L (ref 5–15)
AST SERPL-CCNC: 20 U/L (ref 1–32)
BASOPHILS # BLD AUTO: 0.04 10*3/MM3 (ref 0–0.2)
BASOPHILS NFR BLD AUTO: 0.8 % (ref 0–1.5)
BILIRUB SERPL-MCNC: 0.4 MG/DL (ref 0–1.2)
BUN SERPL-MCNC: 26 MG/DL (ref 8–23)
BUN/CREAT SERPL: 23.4 (ref 7–25)
CALCIUM SPEC-SCNC: 9 MG/DL (ref 8.6–10.5)
CHLORIDE SERPL-SCNC: 103 MMOL/L (ref 98–107)
CO2 SERPL-SCNC: 30 MMOL/L (ref 22–29)
CREAT SERPL-MCNC: 1.11 MG/DL (ref 0.57–1)
DEPRECATED RDW RBC AUTO: 65.7 FL (ref 37–54)
EOSINOPHIL # BLD AUTO: 0.18 10*3/MM3 (ref 0–0.4)
EOSINOPHIL NFR BLD AUTO: 3.8 % (ref 0.3–6.2)
ERYTHROCYTE [DISTWIDTH] IN BLOOD BY AUTOMATED COUNT: 18.2 % (ref 12.3–15.4)
GFR SERPL CREATININE-BSD FRML MDRD: 50 ML/MIN/1.73
GLOBULIN UR ELPH-MCNC: 2.6 GM/DL
GLUCOSE SERPL-MCNC: 127 MG/DL (ref 65–99)
HCT VFR BLD AUTO: 38.5 % (ref 34–46.6)
HGB BLD-MCNC: 12.3 G/DL (ref 12–15.9)
IMM GRANULOCYTES # BLD AUTO: 0.01 10*3/MM3 (ref 0–0.05)
IMM GRANULOCYTES NFR BLD AUTO: 0.2 % (ref 0–0.5)
LYMPHOCYTES # BLD AUTO: 1.06 10*3/MM3 (ref 0.7–3.1)
LYMPHOCYTES NFR BLD AUTO: 22.5 % (ref 19.6–45.3)
MAGNESIUM SERPL-MCNC: 2 MG/DL (ref 1.6–2.4)
MCH RBC QN AUTO: 31.9 PG (ref 26.6–33)
MCHC RBC AUTO-ENTMCNC: 31.9 G/DL (ref 31.5–35.7)
MCV RBC AUTO: 99.7 FL (ref 79–97)
MONOCYTES # BLD AUTO: 0.54 10*3/MM3 (ref 0.1–0.9)
MONOCYTES NFR BLD AUTO: 11.5 % (ref 5–12)
NEUTROPHILS NFR BLD AUTO: 2.88 10*3/MM3 (ref 1.7–7)
NEUTROPHILS NFR BLD AUTO: 61.2 % (ref 42.7–76)
NRBC BLD AUTO-RTO: 0 /100 WBC (ref 0–0.2)
PHOSPHATE SERPL-MCNC: 3.4 MG/DL (ref 2.5–4.5)
PLATELET # BLD AUTO: 262 10*3/MM3 (ref 140–450)
PMV BLD AUTO: 10.4 FL (ref 6–12)
POTASSIUM SERPL-SCNC: 4.9 MMOL/L (ref 3.5–5.2)
PROT SERPL-MCNC: 6.8 G/DL (ref 6–8.5)
RBC # BLD AUTO: 3.86 10*6/MM3 (ref 3.77–5.28)
SODIUM SERPL-SCNC: 141 MMOL/L (ref 136–145)
WBC NRBC COR # BLD: 4.71 10*3/MM3 (ref 3.4–10.8)

## 2021-12-13 PROCEDURE — 85025 COMPLETE CBC W/AUTO DIFF WBC: CPT | Performed by: INTERNAL MEDICINE

## 2021-12-13 PROCEDURE — 83735 ASSAY OF MAGNESIUM: CPT | Performed by: INTERNAL MEDICINE

## 2021-12-13 PROCEDURE — 36415 COLL VENOUS BLD VENIPUNCTURE: CPT

## 2021-12-13 PROCEDURE — 25010000002 DENOSUMAB 120 MG/1.7ML SOLUTION: Performed by: NURSE PRACTITIONER

## 2021-12-13 PROCEDURE — 99215 OFFICE O/P EST HI 40 MIN: CPT | Performed by: NURSE PRACTITIONER

## 2021-12-13 PROCEDURE — 96372 THER/PROPH/DIAG INJ SC/IM: CPT

## 2021-12-13 PROCEDURE — 80053 COMPREHEN METABOLIC PANEL: CPT | Performed by: INTERNAL MEDICINE

## 2021-12-13 PROCEDURE — 84100 ASSAY OF PHOSPHORUS: CPT | Performed by: INTERNAL MEDICINE

## 2021-12-13 RX ORDER — TRIMETHOPRIM 100 MG/1
100 TABLET ORAL DAILY
COMMUNITY
Start: 2021-12-07 | End: 2022-06-06

## 2021-12-13 RX ADMIN — DENOSUMAB 120 MG: 120 INJECTION SUBCUTANEOUS at 11:54

## 2021-12-16 ENCOUNTER — SPECIALTY PHARMACY (OUTPATIENT)
Dept: PHARMACY | Facility: HOSPITAL | Age: 61
End: 2021-12-16

## 2021-12-16 RX ORDER — MESALAMINE 1.2 G/1
TABLET, DELAYED RELEASE ORAL
Qty: 180 TABLET | Refills: 3 | Status: SHIPPED | OUTPATIENT
Start: 2021-12-16 | End: 2022-12-14

## 2021-12-21 ENCOUNTER — TELEPHONE (OUTPATIENT)
Dept: ONCOLOGY | Facility: CLINIC | Age: 61
End: 2021-12-21

## 2021-12-21 RX ORDER — TRAMADOL HYDROCHLORIDE 50 MG/1
50 TABLET ORAL EVERY 8 HOURS PRN
Qty: 90 TABLET | Refills: 1 | Status: SHIPPED | OUTPATIENT
Start: 2021-12-21 | End: 2022-02-07 | Stop reason: SDUPTHER

## 2021-12-21 NOTE — TELEPHONE ENCOUNTER
Caller: BLADIMIR ESTRADA    Relationship: [facility]         What was the call regarding: CALLING REGARDING SCRIPT PT HAD REQUESTED REFILL 12/15 AND [facility] ALSO SENT IN REQUEST FOR REFILL ON 12/15 AND STILL HAVE NOT RECEIVE SCRIPT FOR THIS CALLING TO FOLLOW UP       I DID SEE THE REQUEST IN ENCOUNTER NOTES 12/15 NOT SURE WHY HASNT BEEN SENT BACK YET      I CALLED AND SPOKE WITH VANDANA AT THE  AND SHE OFFERED TO TAKE THE CALL AND GET BLADIMIR TO [place] WITH PHARMACY TO FURTHER ASSIST.     [facility] TRANSFERRED BLADIMIR TO VANDANA TO FURTHER ASSIST.

## 2021-12-29 ENCOUNTER — SPECIALTY PHARMACY (OUTPATIENT)
Dept: PHARMACY | Facility: HOSPITAL | Age: 61
End: 2021-12-29

## 2021-12-29 RX ORDER — CAPECITABINE 500 MG/1
TABLET, FILM COATED ORAL
Qty: 70 TABLET | Refills: 3 | Status: SHIPPED | OUTPATIENT
Start: 2021-12-29 | End: 2022-05-06 | Stop reason: SDUPTHER

## 2021-12-29 NOTE — PROGRESS NOTES
Specialty Pharmacy Refill Coordination Note     Martha is a 61 y.o. female contacted today regarding refills of  Xeloda specialty medication(s).    Reviewed and verified with patient:       Specialty medication(s) and dose(s) confirmed: yes    Refill Questions      Most Recent Value   Changes to allergies? No   Changes to medications? No   New conditions since last clinic visit No   Unplanned office visit, urgent care, ED, or hospital admission in the last 4 weeks  No   How does patient/caregiver feel medication is working? Good   How many doses of your specialty medications were missed in the last 4 weeks? none   Does this patient require a clinical escalation to a pharmacist? No          Delivery Questions      Most Recent Value   Delivery method FedEx  [[employer]Ex priority Sig req. ship 1/6 deliver 1/7]   Delivery address correct? Yes   Preferred delivery time? AM   Number of medications in delivery 1   Medication being filled and delivered Xeloda   Doses left of specialty medications 1 week   Questions or concerns for the pharmacist? No                 Follow-up: 3 week(s)     Ami Hudson  Specialty Pharmacy Technician

## 2021-12-29 NOTE — TELEPHONE ENCOUNTER
Refill requested from pharmacy. Per last chart note, patient to continue xeloda 1000 mg QAM and 1500 mg QPM, 7 on/7off. Will route to MD for cosignature.

## 2022-01-06 ENCOUNTER — TELEMEDICINE (OUTPATIENT)
Dept: PSYCHIATRY | Facility: CLINIC | Age: 62
End: 2022-01-06

## 2022-01-06 DIAGNOSIS — F33.1 MAJOR DEPRESSIVE DISORDER, RECURRENT EPISODE, MODERATE: Primary | ICD-10-CM

## 2022-01-06 DIAGNOSIS — F41.1 GENERALIZED ANXIETY DISORDER: ICD-10-CM

## 2022-01-06 PROCEDURE — 90791 PSYCH DIAGNOSTIC EVALUATION: CPT | Performed by: COUNSELOR
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
## 2022-01-07 DIAGNOSIS — G47.33 OSA (OBSTRUCTIVE SLEEP APNEA): ICD-10-CM

## 2022-01-07 DIAGNOSIS — R53.0 NEOPLASTIC MALIGNANT RELATED FATIGUE: Primary | ICD-10-CM

## 2022-01-07 DIAGNOSIS — R53.0 NEOPLASTIC MALIGNANT RELATED FATIGUE: ICD-10-CM

## 2022-01-07 DIAGNOSIS — F33.1 MAJOR DEPRESSIVE DISORDER, RECURRENT EPISODE, MODERATE: ICD-10-CM

## 2022-01-07 RX ORDER — MODAFINIL 200 MG/1
200 TABLET ORAL DAILY
Qty: 30 TABLET | Refills: 2 | Status: SHIPPED | OUTPATIENT
Start: 2022-01-07 | End: 2022-03-08 | Stop reason: SDUPTHER

## 2022-01-07 RX ORDER — MODAFINIL 200 MG/1
TABLET ORAL
Qty: 30 TABLET | Refills: 0 | OUTPATIENT
Start: 2022-01-07

## 2022-01-10 ENCOUNTER — OFFICE VISIT (OUTPATIENT)
Dept: ONCOLOGY | Facility: CLINIC | Age: 62
End: 2022-01-10

## 2022-01-10 ENCOUNTER — LAB (OUTPATIENT)
Dept: OTHER | Facility: HOSPITAL | Age: 62
End: 2022-01-10

## 2022-01-10 ENCOUNTER — INFUSION (OUTPATIENT)
Dept: ONCOLOGY | Facility: HOSPITAL | Age: 62
End: 2022-01-10

## 2022-01-10 VITALS
RESPIRATION RATE: 18 BRPM | SYSTOLIC BLOOD PRESSURE: 117 MMHG | OXYGEN SATURATION: 97 % | TEMPERATURE: 97.8 F | BODY MASS INDEX: 36.15 KG/M2 | DIASTOLIC BLOOD PRESSURE: 74 MMHG | HEIGHT: 61 IN | HEART RATE: 83 BPM | WEIGHT: 191.5 LBS

## 2022-01-10 DIAGNOSIS — C50.811 MALIGNANT NEOPLASM OF OVERLAPPING SITES OF RIGHT BREAST IN FEMALE, ESTROGEN RECEPTOR NEGATIVE: Primary | ICD-10-CM

## 2022-01-10 DIAGNOSIS — Z17.1 MALIGNANT NEOPLASM OF OVERLAPPING SITES OF RIGHT BREAST IN FEMALE, ESTROGEN RECEPTOR NEGATIVE: Primary | ICD-10-CM

## 2022-01-10 DIAGNOSIS — Z17.1 MALIGNANT NEOPLASM OF OVERLAPPING SITES OF RIGHT BREAST IN FEMALE, ESTROGEN RECEPTOR NEGATIVE: ICD-10-CM

## 2022-01-10 DIAGNOSIS — C50.811 MALIGNANT NEOPLASM OF OVERLAPPING SITES OF RIGHT BREAST IN FEMALE, ESTROGEN RECEPTOR NEGATIVE: ICD-10-CM

## 2022-01-10 LAB
ALBUMIN SERPL-MCNC: 4.2 G/DL (ref 3.5–5.2)
ALBUMIN/GLOB SERPL: 1.8 G/DL
ALP SERPL-CCNC: 63 U/L (ref 39–117)
ALT SERPL W P-5'-P-CCNC: 15 U/L (ref 1–33)
ANION GAP SERPL CALCULATED.3IONS-SCNC: 7 MMOL/L (ref 5–15)
AST SERPL-CCNC: 21 U/L (ref 1–32)
BASOPHILS # BLD AUTO: 0.05 10*3/MM3 (ref 0–0.2)
BASOPHILS NFR BLD AUTO: 1.1 % (ref 0–1.5)
BILIRUB SERPL-MCNC: 0.4 MG/DL (ref 0–1.2)
BUN SERPL-MCNC: 25 MG/DL (ref 8–23)
BUN/CREAT SERPL: 25.8 (ref 7–25)
CALCIUM SPEC-SCNC: 9.6 MG/DL (ref 8.6–10.5)
CHLORIDE SERPL-SCNC: 101 MMOL/L (ref 98–107)
CO2 SERPL-SCNC: 31 MMOL/L (ref 22–29)
CREAT SERPL-MCNC: 0.97 MG/DL (ref 0.57–1)
DEPRECATED RDW RBC AUTO: 63.3 FL (ref 37–54)
EOSINOPHIL # BLD AUTO: 0.2 10*3/MM3 (ref 0–0.4)
EOSINOPHIL NFR BLD AUTO: 4.2 % (ref 0.3–6.2)
ERYTHROCYTE [DISTWIDTH] IN BLOOD BY AUTOMATED COUNT: 17.2 % (ref 12.3–15.4)
GFR SERPL CREATININE-BSD FRML MDRD: 58 ML/MIN/1.73
GLOBULIN UR ELPH-MCNC: 2.3 GM/DL
GLUCOSE SERPL-MCNC: 104 MG/DL (ref 65–99)
HCT VFR BLD AUTO: 37.6 % (ref 34–46.6)
HGB BLD-MCNC: 11.8 G/DL (ref 12–15.9)
IMM GRANULOCYTES # BLD AUTO: 0.02 10*3/MM3 (ref 0–0.05)
IMM GRANULOCYTES NFR BLD AUTO: 0.4 % (ref 0–0.5)
LYMPHOCYTES # BLD AUTO: 0.98 10*3/MM3 (ref 0.7–3.1)
LYMPHOCYTES NFR BLD AUTO: 20.6 % (ref 19.6–45.3)
MAGNESIUM SERPL-MCNC: 2 MG/DL (ref 1.6–2.4)
MCH RBC QN AUTO: 31.2 PG (ref 26.6–33)
MCHC RBC AUTO-ENTMCNC: 31.4 G/DL (ref 31.5–35.7)
MCV RBC AUTO: 99.5 FL (ref 79–97)
MONOCYTES # BLD AUTO: 0.73 10*3/MM3 (ref 0.1–0.9)
MONOCYTES NFR BLD AUTO: 15.3 % (ref 5–12)
NEUTROPHILS NFR BLD AUTO: 2.78 10*3/MM3 (ref 1.7–7)
NEUTROPHILS NFR BLD AUTO: 58.4 % (ref 42.7–76)
NRBC BLD AUTO-RTO: 0 /100 WBC (ref 0–0.2)
PHOSPHATE SERPL-MCNC: 3.8 MG/DL (ref 2.5–4.5)
PLATELET # BLD AUTO: 259 10*3/MM3 (ref 140–450)
PMV BLD AUTO: 10.2 FL (ref 6–12)
POTASSIUM SERPL-SCNC: 4.9 MMOL/L (ref 3.5–5.2)
PROT SERPL-MCNC: 6.5 G/DL (ref 6–8.5)
RBC # BLD AUTO: 3.78 10*6/MM3 (ref 3.77–5.28)
SODIUM SERPL-SCNC: 139 MMOL/L (ref 136–145)
WBC NRBC COR # BLD: 4.76 10*3/MM3 (ref 3.4–10.8)

## 2022-01-10 PROCEDURE — 80053 COMPREHEN METABOLIC PANEL: CPT | Performed by: INTERNAL MEDICINE

## 2022-01-10 PROCEDURE — 25010000002 DENOSUMAB 120 MG/1.7ML SOLUTION: Performed by: INTERNAL MEDICINE

## 2022-01-10 PROCEDURE — 83735 ASSAY OF MAGNESIUM: CPT | Performed by: INTERNAL MEDICINE

## 2022-01-10 PROCEDURE — 85025 COMPLETE CBC W/AUTO DIFF WBC: CPT | Performed by: INTERNAL MEDICINE

## 2022-01-10 PROCEDURE — 84100 ASSAY OF PHOSPHORUS: CPT | Performed by: INTERNAL MEDICINE

## 2022-01-10 PROCEDURE — 36415 COLL VENOUS BLD VENIPUNCTURE: CPT

## 2022-01-10 PROCEDURE — 99214 OFFICE O/P EST MOD 30 MIN: CPT | Performed by: INTERNAL MEDICINE

## 2022-01-10 PROCEDURE — 96372 THER/PROPH/DIAG INJ SC/IM: CPT

## 2022-01-10 RX ORDER — APIXABAN 5 MG/1
TABLET, FILM COATED ORAL
Qty: 180 TABLET | Refills: 3 | Status: SHIPPED | OUTPATIENT
Start: 2022-01-10 | End: 2022-01-10 | Stop reason: SDUPTHER

## 2022-01-10 RX ORDER — SUCRALFATE ORAL 1 G/10ML
1 SUSPENSION ORAL 4 TIMES DAILY
Qty: 420 ML | Refills: 2 | Status: SHIPPED | OUTPATIENT
Start: 2022-01-10 | End: 2022-02-07 | Stop reason: SDUPTHER

## 2022-01-10 RX ORDER — TEMAZEPAM 15 MG/1
15 CAPSULE ORAL NIGHTLY PRN
Qty: 90 CAPSULE | Refills: 1 | Status: SHIPPED | OUTPATIENT
Start: 2022-01-10 | End: 2022-05-10

## 2022-01-10 RX ORDER — TEMAZEPAM 15 MG/1
15 CAPSULE ORAL NIGHTLY PRN
Qty: 90 CAPSULE | Refills: 1 | Status: SHIPPED | OUTPATIENT
Start: 2022-01-10 | End: 2022-01-10 | Stop reason: SDUPTHER

## 2022-01-10 RX ADMIN — DENOSUMAB 120 MG: 120 INJECTION SUBCUTANEOUS at 11:40

## 2022-01-10 NOTE — PROGRESS NOTES
"She willChief Complaint  Previous Stage Ib ([id number]) ER/WV positive, HER-2/peter negative right breast cancer with subsequent metastatic disease identified 10/8/2017, history of right pulmonary embolism, cancer related pain, chemotherapy-induced diarrhea, chemotherapy-induced mucositis, chemotherapy-induced hand-foot syndrome    Subjective        History of Present Illness  The patient returns today in follow-up continuing on oral Xeloda 1000 mg in the morning, 1500 mg in the evening for 7 days on followed by 7 days off as well as monthly Xgeva and Eliquis 5 mg twice daily.  Patient returns today in follow-up due for Xgeva. She has laboratory studies to review today.  In the interval since her last visit she has done relatively well reporting -foot symptoms.  She did see Dr. Ocasio in GI and is scheduled for upcoming EGD and colonoscopy on 2/12/2022.  Her reflux symptoms are fairly stable taking omeprazole 20 mg daily and Carafate 1 g 4 times daily.  She did see Dr. Johnson in urogynecology and is now taking trimethoprim 100 mg daily as prophylaxis for UTI and she has not experienced any recent symptoms.  She has been continuing on Eliquis 5 mg twice daily, reports that she is running out of the medication today as she has not received her refill from mail-order pharmacy and is requesting that we send this to [facility] so she can resume the medication.  She also is requesting a refill on temazepam 50 mg nightly which she has been using for her insomnia and has been fairly effective.  She has no new complaints today, continues with ECOG performance status of 1.      Objective   Vital Signs:   /74   Pulse 83   Temp 97.8 °F (36.6 °C) (Temporal)   Resp 18   Ht 156 cm (61.42\")   Wt 86.9 kg (191 lb 8 oz)   SpO2 97%   BMI 35.69 kg/m²     Physical Exam  Constitutional:       Appearance: She is well-developed.   Eyes:      Conjunctiva/sclera: Conjunctivae normal.   Neck:      Thyroid: No thyromegaly. "   Cardiovascular:      Rate and Rhythm: Normal rate and regular rhythm.      Heart sounds: No murmur heard.  No friction rub. No gallop.    Pulmonary:      Effort: No respiratory distress.      Breath sounds: Normal breath sounds.   Chest:   Breasts:      Right: No supraclavicular adenopathy.      Left: No supraclavicular adenopathy.       Abdominal:      General: Bowel sounds are normal. There is no distension.      Palpations: Abdomen is soft.      Tenderness: There is no abdominal tenderness.   Lymphadenopathy:      Head:      Right side of head: No submandibular adenopathy.      Cervical: No cervical adenopathy.      Upper Body:      Right upper body: No supraclavicular adenopathy.      Left upper body: No supraclavicular adenopathy.   Skin:     General: Skin is warm and dry.      Findings: Rash present.      Comments: Patient continues with palmar erythema with skin cracking and peeling.  The extension onto the dorsal aspect of the hand in terms of erythema has decreased.  The amount of erythema on the palms of the hands has decreased since her last visit here.  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   Neurological:      Mental Status: She is alert and oriented to person, place, and time.      Cranial Nerves: No cranial nerve deficit.      Motor: No abnormal muscle tone.      Deep Tendon Reflexes: Reflexes normal.   Psychiatric:         Behavior: Behavior normal.        Result Review : Reviewed CBC, CMP, magnesium, phosphorus from today.  Reviewed progress note from GI.     Assessment and Plan     1. Previous Stage Ib ([id number]) right breast cancer:  · Diagnosed May 2010 with excisional biopsy for invasive ductal carcinoma, 1.3 cm, grade 2, ER 90%, MD 80%, HER-2/peter negative (1+ IHC).    · Subsequent right mastectomy in July 2010 with no residual breast malignancy, 1/5 sentinel lymph node with micrometastasis (0.25  mm).    · Treated in the [facility] system with adjuvant AC ×4 cycles in 2010 (no taxanes administered due to underlying Charcot-Saloni-Tooth with peripheral neuropathy).    · Adjuvant Femara (postmenopausal) initiated October 2010 with plan to treat ×10 years.    · Genetic testing reportedly negative.    · Developed osteopenia treated with Prolia beginning 2/27/13. Subsequently discontinued due to identification of metastatic disease.  2. Recurrent/metastatic disease identified 10/8/17:  · Disease involving thoracic spine with cord compression at T6, lumbosacral involvement, sternal and right sternoclavicular involvement.    · Femara discontinued in 10/2017.    · Radiation administered (in the [facility] system) to the thoracic spine beginning 10/19/17 treating T3-T9 to a dose of 24 gray in 6 fractions.  · Evaluation with MRI 12/8/17 showing persistent T6 cord compression with persistent neurologic compromise requiring surgical treatment 12/11/17 with T6 laminectomy/corpectomy and T3-T9 fusion.  Pathology with metastatic carcinoma of breast origin, ER negative, CO negative, HER-2/peter negative (1+ IHC).  · Additional staging evaluation 12/8/17 with no evidence of visceral metastatic disease, bone scan showing involvement of thoracic spine, sternum, left humerus, mid frontal bone.  No plane film correlate of left humerus lesion.  MRI lumbar spine with small intradural L3 metastasis.  CA 15-3 12/6/17- 17.  · Palliative radiation therapy to L3 dural metastasis and left humerus initiated 1/15/18 (10 fractions), completed 1/26/18.  · Hypercalcemia of malignancy with calcium in the 10-11 range.  · Initiation of monthly Xgeva 1/23/18.  · Baseline CT scan 1/30/18 with no evidence of visceral involvement.  Cluster of nodular opacities in the right lower lobe suspected to be infectious or related to bronchiolitis. Bone scan 1/30/18 showed postsurgical change in the thoracic spine, stable uptake in the frontal bone, no new areas of  disease.  · Initiation of palliative oral single agent Xeloda 2/7/18 2000 mg a.m., 1500 mg p.m. for 14/21 days.   · Following 3 cycles xeloda, bone scan 4/4/18 showed no change from the prior study.  CT scan 4/4/18 showed a small pericardial effusion of unclear significance as well as a subcutaneous nodule in the right lateral chest wall.  Subsequent evaluation with echocardiogram 4/17/18 showed no evidence of pericardial effusion.  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  We discussed the possibility of surgical excision of the right subcutaneous chest wall lesion for more definitive diagnosis.  Reviewed previous CT images dating back to 12/8/17 and the lesion was present even at that time measuring around 1.7 cm although not commented on in the radiology report.  As this appears to be an indolent low-grade process unrelated to her breast cancer, recommendee foregoing surgical excision at this time and monitoring this area on future scans.  The patient agreed.    · Following 6 cycles of Xeloda, CT 6/6/18  showed stable findings, no evidence of progressive disease.  There was a comment regarding subcutaneous abnormality in the anterior abdominal wall and this was related to Lovenox injection sites.  Bone scan 6/6/18 showed no interval change.   · CT scan and bone scan 8/13/18 following 9 cycles of Xeloda showed stable findings with no evidence of significant visceral metastases.  Her bone lesions appear stable on bone scan.  The spindle cell neoplasm in her right chest wall actually decreased in size from 2 cm down to 1.6 cm.    · The patient experienced some symptoms of diarrhea, anorexia, generalized weakness during cycle 9 Xeloda it was unclear whether this was related to a viral gastroenteritis or toxicity from treatment.  Symptoms recurred during cycle 10 and  treatment was cut short by 2 days.  Symptoms attributed to Xeloda.  With cycle 11, dose and schedule altered to 1500 mg twice daily for 7 days on followed by 7 days off .  · CT scan 9/9/2020 with no significant changes.  Bone scan 9/9/2020 read as unchanged from prior studies however did note an area of slight activity in the medial left femur.  Contacted radiology and although this was not noted on prior reports appears to have been present.  Subsequent MRI left femur 9/21/2020 with cortical thickening and periosteal edema left iliac us muscle insertion to the medial left femur with no evidence of metastatic disease, favored to represent periosteal change secondary to insertional tendinitis.  · Severe hand-foot symptoms causing sclerodactyly and limitation in finger movement prompted change in dosing in July 2021 with Xeloda decreased to 1000 mg a.m., 1500 mg p.m. for 7 days on followed by 7 days off.  · Most recent 3-month interval scans from 11/8/2021 with CT chest abdomen pelvis, bone scan performed.  CT scans showed some lobulated nodular opacity in cluster of nodules in the right lower lobe (felt to be infectious/inflammatory), no other changes.  Bone scan was stable.  · The patient returns today continuing on treatment with Xeloda 1000 mg a.m., 1500 mg p.m. 7 days on followed by 7 days off.  She is due today for monthly Xgeva.  The patient today is tolerating treatment reasonably well.  She has significant hand-foot symptoms from Xeloda but feels that symptoms are stable and tolerable.  On exam today there has been a slight decrease in erythema in the palms, degree of skin peeling and cracking is stable and the degree of involvement of the dorsal aspect of the hand has overall improved.  She has no new complaints today in relation to her treatment and no clinical evidence of progressive disease.  She will be undergoing scans in 3 weeks and I will see her back in 4 weeks to review results.  3. Right pulmonary  embolism:  · Diagnosed on CT angiogram 10/21/17 involving small right lower lobe pulmonary artery.  Lower extremity Dopplers negative.  · Bilateral lower extremity Dopplers negative again 12/5/17.  · Received chronic Lovenox 1 mg/kg twice per day, transition to oral Eliquis in February 2019, continuing on Eliquis 5 mg twice daily.  · The patient has had no bleeding difficulties on anticoagulation  4. Cancer related pain:  · Previously receiving Duragesic 50 µg patch every 72 hours along with Dilaudid 4 mg as needed for breakthrough pain  · The patient's pain improved over time and she was able to discontinue both Duragesic and Dilaudid in the interval.  · Patient does take occasional Flexeril at bedtime due to back spasm/pain when she has been more active.  · The patient does have some occasional aggravation of her chronic back pain and does use tramadol 50 mg every 8 hours as needed  · Patient's pain is stable.  She does continue to use tramadol 50 mg every 12 hours as needed which has been effective.  The patient does have chronic difficulty with pain involving her low back, left shoulder.  Physical therapy has helped to produce some relief.  Pain pain today is stable.  5. Chemotherapy-induced diarrhea with subsequent C. difficile colitis in the setting of previous ulcerative colitis:  · Patient with reported history of ulcerative colitis, is not on active therapy.  · The patient developed initial diarrhea related to Xeloda at regular dosing.  · Symptoms improved on reduced dose Xeloda  · Flare of symptoms in October 2018 with apparent finding of C. difficile colitis by GI, treated with course of oral vancomycin with improvement in symptoms.  · Patient notes minimal intermittent diarrhea/loose stools on Xeloda requiring occasional dosing of Imodium.  Bowel function recently has been stable.  6. Traumatic left tibia/fibular fracture:  · Status post ORIF 12/6/17  · Specimen was sent for pathologic review, negative  for evidence of malignancy  7. Hypercalcemia:  · Suspect hypercalcemia of malignancy, calcium in  10-11 range previously.  · Calcium normalized following initiation of monthly Xgeva on 1/23/18.  8. Chemotherapy-induced mucositis:  · Patient had a minimal degree of mucositis with cycle 2.  The patient has magic mouthwash to use as needed.  No subsequent mucositis.  9. Recurrent UTI, bladder wall thickening on CT:  · Patient had an enterococcal UTI on 3/2/18 sensitive to nitrofurantoin and received treatment, unclear how long.  · Recurrent UTI 3/20/18 with urine culture growing Klebsiella, initially treated with nitrofurantoin, transitioned to Levaquin.  · CT 4/4/18 with diffuse bladder wall thickening with increased nodular thickening at the left base.  Referral to urogynecology Dr. May Johnson.  She was placed on a prophylactic dose of trimethoprim 100 mg daily, bladder wall thickening felt to be related to recent recurrent urinary tract infections.  · Patient with urinary symptoms, treated with course of Macrobid at the end of December 2018, urine culture however was negative 12/31/18.  · Patient was found to have Klebsiella UTI 7/29/2019 which was successfully treated with Macrobid with complete resolution of symptoms.  · CT 8/10/2021 with diffuse bladder wall thickening new from 5/17/2021 (however seen on multiple prior scans).    · UTI on 10/18/2021 with E. coli greater than 100,000 colonies that was pansensitive, treated with Macrobid x7 days  · With ongoing/recurrent UTIs patient was seen by urogynecology and initiated suppressive therapy with trimethoprim 100 mg daily in December 2021.  She has not experienced any further urinary tract infections.  10.   Mobility:  · The patient underwent an intensive course of rehabilitation at [facility].  She graduated from her outpatient course November 2018.  · Overall the patient has improved dramatically in terms of mobility, now walking around 1 mile per day without  assistance.  11.  Depression:  · The patient is continuing follow-up in the supportive oncology clinic and is currently continuing on Cymbalta 30 mg twice daily as well as gabapentin 600 mg nightly.  · The patient feels that her depression symptoms are currently fairly well controlled.  She has seen some benefit from attending support groups at [facility] which she plans to continue.  · The patient does continue follow-up in supportive oncology clinic, last seen on 12/3/2021.  Symptoms are fairly stable  12. 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  Symptoms did improve and she continued on the same dose.    · Patient was referred to dermatology and has been continuing on triamcinolone 0.1% cream used for 1 week on followed by 1 week off which led to some further improvement.   · Progressive palmar erythema with development of sclerodactyly and effect on dexterity.  Addition of urea-based cream 3 times daily.  · Patient with continued difficulty regarding erythema of her hands that extended onto the dorsal aspect and was causing contractures/sclerodactyly in her fingers affecting her dexterity.  In July 2021, held Xeloda for an additional week and then reduced dose from 1500 mg twice daily down to 1000 mg a.m. and 1500 mg p.m. and continued on a 7-day on followed by 7-day off schedule.  · With reduction in Xeloda dose, slight improvement in erythema involving dorsal aspect of the hands and slight decrease in sclerodactyly  · Patient today with ongoing difficulties with hand-foot syndrome.  She has continued evidence of erythematous, dry, cracked skin involving the palms of the hands.  The degree of erythema involving the dorsal aspect of the hands has improved further.  The degree of sclerodactyly with skin thickening has decreased  overall and mobility of the patient's fingers has increased.  She will continue with frequent application of emollient creams urea and triamcinolone creams as needed.  13. Evidence of steatosis on scans with mild intermittent elevated liver function studies:  · Liver function studies increased 8/20/19 with ALT 98, AST 70, normal total bilirubin.  · Negative viral hepatitis A, B, and C panel 8/23/18  · Likely related to hepatic steatosis.   · Subsequent improvement in LFTs  · Today, LFTs remain normal  14. Chemotherapy induced leukopenia:  · WBC today  4.76  15. GERD:  · Patient with significant history of reflux  · Patient currently receiving Prilosec 20 mg twice daily and Carafate 1 g 4 times daily  · Patient did follow-up with Dr. Ocasio and is scheduled for EGD and colonoscopy on 2/12/2022  16. Insomnia:  · Patient with prior paradoxical reaction to Benadryl  · Improved previously on temazepam 15 mg nightly as needed.   · Patient noted subsequently that temazepam was having no effect.   · Symptoms currently stable on gabapentin 600 mg nightly  · Today, patient requests refill on temazepam which she has returned to using it 15 mg nightly, does report some effectiveness.  Refill provided today, #90.  17. Health maintenance:  · Patient notes that she has a history of colon polyps as well as ulcerative colitis and was due for a follow-up colonoscopy on 9/12/2019.  We did discuss there is no necessity to pursue colonoscopy in the setting of her metastatic breast cancer.    · The patient did undergo colonoscopy on 2/7/2020 with findings of muscular hypertrophy and diverticulosis.   18. Right shoulder pain:  · Patient was evaluated by Dr Forrester.  She has experienced difficulty over a long time frame with right shoulder although she has not complained of this in prior visits to our office.  She reported difficulty with abduction.    · The patient did undergo MRI of her right shoulder on 11/21/2019 at an outside facility  showing multiple abnormalities including supraspinatus tendinosis, labral tear but no evidence of metastatic disease.  · Patient developed pain in the left shoulder, was seen by orthopedics and underwent steroid injection with some improvement.  Right shoulder is stable currently.  Patient is continuing with physical therapy.  19. Ocular changes in part related to Xeloda:  · Patient experienced a mild degree of blurred vision as well as burning and pruritus.  · She was seen by her ophthalmologist and was placed on xiidra ophthalmic drops which have helped.  · Likely both issues are to some extent related to Xeloda.  · Symptoms did worsen somewhat recently and patient has been seen by a new ophthalmologist at [facility].  She is now using an ocular lubricant at night in addition to artificial tears which have helped.  20. Elevated glucose:  · It is noted the patient's glucose at the last few visits has been in the high 100 range, postprandial.  · She has had a hemoglobin A1c that has been in the high 5-6 range in the past, on 5/1/2019 at 5.7  · Hemoglobin A1c on 5/7/2021 was improved at 5.2  21. Possible loosening of pedicle screw at T3:  · CT cervical spine 5/17/2021 showed loosening of the left pedicle screw at T3.  Patient referred to orthopedics and was seen by Dr. Nayak who felt that there were no concerning findings on the scan  22. COVID-19 vaccination  · Patient received the Pfizer vaccine, second dose administered on 4/2/2021 without side effects.  · Patient received her third dose Pfizer COVID-19 injection in August 2021     Plan:  1. Continue Xeloda 1000 mg a.m., 1500 mg in the p.m. 7 days on followed by 7 days off  2. Monthly Xgeva today  3. Continue Eliquis 5 mg twice daily, refill prescription sent today.  4. Continue use of emollient cream frequently and urea-based cream and triamcinolone as needed on the hands and feet and continue to wear socks and gloves at night .  5. Continue  omeprazole 20 mg twice daily  6. Prescription sent for Carafate 1 g 4 times daily, refill prescription sent today   7. Continue ocular lubricating gel nightly per ophthalmology  8. Continue Provigil 100 mg daily and Cymbalta 30 mg twice daily per supportive oncology clinic.  Patient continues routine follow-up with supportive oncology.  9. Refill sent today for temazepam 15 mg nightly as needed (#90).  10. In 3 weeks CT chest abdomen pelvis and bone scan  11. In 4 weeks MD visit with CBC, CMP, magnesium, phosphorus and monthly Xgeva. We will review scan results at that time.     Patient continuing on high risk medication requiring intensive monitoring.

## 2022-01-28 ENCOUNTER — SPECIALTY PHARMACY (OUTPATIENT)
Dept: PHARMACY | Facility: HOSPITAL | Age: 62
End: 2022-01-28

## 2022-01-28 NOTE — PROGRESS NOTES
Specialty Pharmacy Refill Coordination Note     Martha is a 61 y.o. female contacted today regarding refills of  Xeloda specialty medication(s).    Reviewed and verified with patient:  Allergies  Meds       Specialty medication(s) and dose(s) confirmed: Yes    Refill Questions      Most Recent Value   Changes to allergies? No   Changes to medications? No   New conditions since last clinic visit No   Unplanned office visit, urgent care, ED, or hospital admission in the last 4 weeks  No   How does patient/caregiver feel medication is working? Very good   Financial problems or insurance changes  No   How many doses of your specialty medications were missed in the last 4 weeks? none          Delivery Questions      Most Recent Value   Delivery method --  [[employer] [facility] - signature required - ship on 1/31 to arrive on 2/1 - notes to : ring doorbell]   Delivery address correct? Yes   Preferred delivery time? AM   Number of medications in delivery 1   Medication being filled and delivered Xeloda   Doses left of specialty medications 1 week   Is there any medication that is due not being filled? No               Follow-up: 3 week(s)    Elizabeth Schafer Pharmacist  1/28/2022  12:30 EST

## 2022-01-31 ENCOUNTER — HOSPITAL ENCOUNTER (OUTPATIENT)
Dept: NUCLEAR MEDICINE | Facility: HOSPITAL | Age: 62
Discharge: HOME | End: 2022-01-31

## 2022-01-31 ENCOUNTER — HOSPITAL ENCOUNTER (OUTPATIENT)
Dept: CT IMAGING | Facility: HOSPITAL | Age: 62
Discharge: HOME | End: 2022-01-31
Admitting: INTERNAL MEDICINE

## 2022-01-31 DIAGNOSIS — Z17.1 MALIGNANT NEOPLASM OF OVERLAPPING SITES OF RIGHT BREAST IN FEMALE, ESTROGEN RECEPTOR NEGATIVE: ICD-10-CM

## 2022-01-31 DIAGNOSIS — C50.811 MALIGNANT NEOPLASM OF OVERLAPPING SITES OF RIGHT BREAST IN FEMALE, ESTROGEN RECEPTOR NEGATIVE: ICD-10-CM

## 2022-01-31 LAB — CREAT BLDA-MCNC: 0.8 MG/DL (ref 0.6–1.3)

## 2022-01-31 PROCEDURE — 71260 CT THORAX DX C+: CPT

## 2022-01-31 PROCEDURE — 0 DIATRIZOATE MEGLUMINE & SODIUM PER 1 ML: Performed by: INTERNAL MEDICINE

## 2022-01-31 PROCEDURE — 25010000002 IOPAMIDOL 61 % SOLUTION: Performed by: INTERNAL MEDICINE

## 2022-01-31 PROCEDURE — 74177 CT ABD & PELVIS W/CONTRAST: CPT

## 2022-01-31 PROCEDURE — 0 TECHNETIUM MEDRONATE KIT: Performed by: INTERNAL MEDICINE

## 2022-01-31 PROCEDURE — 78306 BONE IMAGING WHOLE BODY: CPT

## 2022-01-31 PROCEDURE — 82565 ASSAY OF CREATININE: CPT

## 2022-01-31 PROCEDURE — A9503 TC99M MEDRONATE: HCPCS | Performed by: INTERNAL MEDICINE

## 2022-01-31 RX ORDER — TC 99M MEDRONATE 20 MG/10ML
19.6 INJECTION, POWDER, LYOPHILIZED, FOR SOLUTION INTRAVENOUS
Status: COMPLETED | OUTPATIENT
Start: 2022-01-31 | End: 2022-01-31

## 2022-01-31 RX ADMIN — DIATRIZOATE MEGLUMINE AND DIATRIZOATE SODIUM 30 ML: 600; 100 SOLUTION ORAL; RECTAL at 09:30

## 2022-01-31 RX ADMIN — Medication 19.6 MILLICURIE: at 09:21

## 2022-01-31 RX ADMIN — IOPAMIDOL 85 ML: 612 INJECTION, SOLUTION INTRAVENOUS at 10:47

## 2022-02-03 NOTE — PROGRESS NOTES
"She willChief Complaint  Previous Stage Ib ([id number]) ER/MO positive, HER-2/peter negative right breast cancer with subsequent metastatic disease identified 10/8/2017, history of right pulmonary embolism, cancer related pain, chemotherapy-induced diarrhea, chemotherapy-induced mucositis, chemotherapy-induced hand-foot syndrome    Subjective        History of Present Illness  The patient returns today in follow-up continuing on oral Xeloda 1000 mg in the morning, 1500 mg in the evening for 7 days on followed by 7 days off as well as monthly Xgeva and Eliquis 5 mg twice daily.  Patient returns today in follow-up due for Xgeva.  She has laboratory studies, CT scans, bone scan to review today.  In the interval since her last visit, hand-foot symptoms have worsened, mainly involving the hands.  She is currently using emollient cream only twice daily and is not using the triamcinolone cream prescribed by dermatology.  She has experienced more skin peeling particularly around her fingers.  Her sclerodactyly has increased somewhat and dexterity has been affected.  She has had some minimal diarrhea relieved with Imodium.  She does note intermittent cough that occurs mainly in the evening and at night which has been felt to be related to reflux.  She does continue on omeprazole 20 mg twice daily and Carafate 1 g 4 times daily.  She was due for EGD and colonoscopy with GI on 2/12/2022 however this has been postponed until March.  She continues on Eliquis 5 mg twice daily with no significant bleeding issues.  She has some chronic pain in her shoulders and back which is relieved with tramadol 50 mg every 8 hours as needed which does provide relief.      Objective   Vital Signs:   /82   Pulse 94   Temp 97.1 °F (36.2 °C) (Temporal)   Resp 16   Ht 156 cm (61.42\")   Wt 86.8 kg (191 lb 6.4 oz)   SpO2 97%   BMI 35.68 kg/m²     Physical Exam  Constitutional:       Appearance: She is well-developed.   Eyes:      " Conjunctiva/sclera: Conjunctivae normal.   Neck:      Thyroid: No thyromegaly.   Cardiovascular:      Rate and Rhythm: Normal rate and regular rhythm.      Heart sounds: No murmur heard.  No friction rub. No gallop.    Pulmonary:      Effort: No respiratory distress.      Breath sounds: Normal breath sounds.   Chest:   Breasts:      Right: No supraclavicular adenopathy.      Left: No supraclavicular adenopathy.       Abdominal:      General: Bowel sounds are normal. There is no distension.      Palpations: Abdomen is soft.      Tenderness: There is no abdominal tenderness.   Lymphadenopathy:      Head:      Right side of head: No submandibular adenopathy.      Cervical: No cervical adenopathy.      Upper Body:      Right upper body: No supraclavicular adenopathy.      Left upper body: No supraclavicular adenopathy.   Skin:     General: Skin is warm and dry.      Findings: Rash present.      Comments: Patient continues with palmar erythema with skin cracking and peeling.  The degree of involvement has worsened since her last visit.  The degree of sclerodactyly and general inflammation has increased with slightly reduced movement of her fingers.  The amount of skin peeling has increased particularly around the fingertips.  This does extend onto the dorsal aspect of the hands to some extent.   Neurological:      Mental Status: She is alert and oriented to person, place, and time.      Cranial Nerves: No cranial nerve deficit.      Motor: No abnormal muscle tone.      Deep Tendon Reflexes: Reflexes normal.   Psychiatric:         Behavior: Behavior normal.        Result Review : Reviewed CBC, CMP, magnesium, phosphorus from today.  Reviewed CT scan and bone scan from 1/31/2022.  I did personally review bone scan images today with interpretation as outlined below.     Assessment and Plan     1. Previous Stage Ib ([id number]) right breast cancer:  · Diagnosed May 2010 with excisional biopsy for invasive ductal carcinoma,  1.3 cm, grade 2, ER 90%, VT 80%, HER-2/peter negative (1+ IHC).    · Subsequent right mastectomy in July 2010 with no residual breast malignancy, 1/5 sentinel lymph node with micrometastasis (0.25 mm).    · Treated in the [facility] system with adjuvant AC ×4 cycles in 2010 (no taxanes administered due to underlying Charcot-Saloni-Tooth with peripheral neuropathy).    · Adjuvant Femara (postmenopausal) initiated October 2010 with plan to treat ×10 years.    · Genetic testing reportedly negative.    · Developed osteopenia treated with Prolia beginning 2/27/13. Subsequently discontinued due to identification of metastatic disease.  2. Recurrent/metastatic disease identified 10/8/17:  · Disease involving thoracic spine with cord compression at T6, lumbosacral involvement, sternal and right sternoclavicular involvement.    · Femara discontinued in 10/2017.    · Radiation administered (in the [facility] system) to the thoracic spine beginning 10/19/17 treating T3-T9 to a dose of 24 gray in 6 fractions.  · Evaluation with MRI 12/8/17 showing persistent T6 cord compression with persistent neurologic compromise requiring surgical treatment 12/11/17 with T6 laminectomy/corpectomy and T3-T9 fusion.  Pathology with metastatic carcinoma of breast origin, ER negative, VT negative, HER-2/peter negative (1+ IHC).  · Additional staging evaluation 12/8/17 with no evidence of visceral metastatic disease, bone scan showing involvement of thoracic spine, sternum, left humerus, mid frontal bone.  No plane film correlate of left humerus lesion.  MRI lumbar spine with small intradural L3 metastasis.  CA 15-3 12/6/17- 17.  · Palliative radiation therapy to L3 dural metastasis and left humerus initiated 1/15/18 (10 fractions), completed 1/26/18.  · Hypercalcemia of malignancy with calcium in the 10-11 range.  · Initiation of monthly Xgeva 1/23/18.  · Baseline CT scan 1/30/18 with no evidence of visceral involvement.  Cluster of nodular opacities in the  right lower lobe suspected to be infectious or related to bronchiolitis. Bone scan 1/30/18 showed postsurgical change in the thoracic spine, stable uptake in the frontal bone, no new areas of disease.  · Initiation of palliative oral single agent Xeloda 2/7/18 2000 mg a.m., 1500 mg p.m. for 14/21 days.   · Following 3 cycles xeloda, bone scan 4/4/18 showed no change from the prior study.  CT scan 4/4/18 showed a small pericardial effusion of unclear significance as well as a subcutaneous nodule in the right lateral chest wall.  Subsequent evaluation with echocardiogram 4/17/18 showed no evidence of pericardial effusion.  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  We discussed the possibility of surgical excision of the right subcutaneous chest wall lesion for more definitive diagnosis.  Reviewed previous CT images dating back to 12/8/17 and the lesion was present even at that time measuring around 1.7 cm although not commented on in the radiology report.  As this appears to be an indolent low-grade process unrelated to her breast cancer, recommendee foregoing surgical excision at this time and monitoring this area on future scans.  The patient agreed.    · Following 6 cycles of Xeloda, CT 6/6/18  showed stable findings, no evidence of progressive disease.  There was a comment regarding subcutaneous abnormality in the anterior abdominal wall and this was related to Lovenox injection sites.  Bone scan 6/6/18 showed no interval change.   · CT scan and bone scan 8/13/18 following 9 cycles of Xeloda showed stable findings with no evidence of significant visceral metastases.  Her bone lesions appear stable on bone scan.  The spindle cell neoplasm in her right chest wall actually decreased in size from 2 cm down to 1.6 cm.    · The patient experienced some symptoms of diarrhea,  anorexia, generalized weakness during cycle 9 Xeloda it was unclear whether this was related to a viral gastroenteritis or toxicity from treatment.  Symptoms recurred during cycle 10 and treatment was cut short by 2 days.  Symptoms attributed to Xeloda.  With cycle 11, dose and schedule altered to 1500 mg twice daily for 7 days on followed by 7 days off .  · CT scan 9/9/2020 with no significant changes.  Bone scan 9/9/2020 read as unchanged from prior studies however did note an area of slight activity in the medial left femur.  Contacted radiology and although this was not noted on prior reports appears to have been present.  Subsequent MRI left femur 9/21/2020 with cortical thickening and periosteal edema left iliac us muscle insertion to the medial left femur with no evidence of metastatic disease, favored to represent periosteal change secondary to insertional tendinitis.  · Severe hand-foot symptoms causing sclerodactyly and limitation in finger movement prompted change in dosing in July 2021 with Xeloda decreased to 1000 mg a.m., 1500 mg p.m. for 7 days on followed by 7 days off.  · Most recent 3-month interval scans from 1/31/2022 with CT chest abdomen pelvis, bone scan performed.  CT scans showed no significant change from prior studies.  Bone scan however showed a new increased focus of activity right inferior ischio pubic ramus corresponding to area of subtle but increasing sclerosis on CT.  Unclear whether this is a new metastatic lesion or treatment response around previously occult lesion.  Decision to pursue 2-month interval scans.  · The patient returns today continuing on treatment with Xeloda 1000 mg a.m., 1500 mg p.m. 7 days on followed by 7 days off.  She is also due today for monthly Xgeva.  The patient has the above scans to review with CT chest abdomen pelvis and bone scan from 1/31/2022 at a 3-month interval.  She does continue to have difficulty on treatment with hand-foot symptoms.  Her  hands are much worse than her feet.  She has had some increase in erythema and skin peeling which has produced an increase in sclerodactyly and effect on her dexterity.  She is not using her emollient cream as frequently, currently only twice daily and is not using the triamcinolone cream prescribed by dermatology nor is she using the urea-based cream although she cannot tell much difference from that treatment.  We discussed continuation of Xeloda at the current dose and schedule and recommended an increase in use of her emollient cream at least 3-4 times per day and to resume using the triamcinolone cream twice daily for now.  She is to only use the triamcinolone for 2 weeks at a time per dermatology.  We will reassess her status in 2 weeks with nurse practitioner visit to see if symptoms are improving.  If not we will need to consider potential further dose reduction in Xeloda.  We reviewed her scans with overall fairly stable findings.  The only concern is on the bone scan which showed a new area of activity in the right inferior ischio pubic ramus.  This is a small area of activity that corresponds to an area of increasing sclerosis on CT.  Unclear if this is a lesion that is treated or whether this could be a new lesion developing.  She is asymptomatic.  We discussed pursuit of repeat CT and bone scan at a 2-month interval to see if there has been any further change in this area or in any other areas.  If this remains a focal area suspicious for solitary progression we would consider potentially radiating the site and continuing Xeloda if the scans are otherwise stable.  For now we will continue current treatment.  I will see her in 4 weeks when she is again due for Xgeva.  She will have scans in 7 weeks and I will see her again shortly thereafter to review results.  3. Right pulmonary embolism:  · Diagnosed on CT angiogram 10/21/17 involving small right lower lobe pulmonary artery.  Lower extremity Dopplers  negative.  · Bilateral lower extremity Dopplers negative again 12/5/17.  · Received chronic Lovenox 1 mg/kg twice per day, transition to oral Eliquis in February 2019, continuing on Eliquis 5 mg twice daily.  · The patient has had no bleeding difficulties on anticoagulation  4. Cancer related pain:  · Previously receiving Duragesic 50 µg patch every 72 hours along with Dilaudid 4 mg as needed for breakthrough pain  · The patient's pain improved over time and she was able to discontinue both Duragesic and Dilaudid in the interval.  · Patient does take occasional Flexeril at bedtime due to back spasm/pain when she has been more active.  · The patient does have some occasional aggravation of her chronic back pain and does use tramadol 50 mg every 8 hours as needed  · Patient's pain is stable.  She does continue to use tramadol 50 mg every 8-12 hours as needed which has been effective.  The patient does have chronic difficulty with pain involving her low back, left shoulder.  Physical therapy has helped to produce some relief.  Pain pain today is stable.  I did provide patient with a refill of tramadol today.  5. Chemotherapy-induced diarrhea with subsequent C. difficile colitis in the setting of previous ulcerative colitis:  · Patient with reported history of ulcerative colitis, is not on active therapy.  · The patient developed initial diarrhea related to Xeloda at regular dosing.  · Symptoms improved on reduced dose Xeloda  · Flare of symptoms in October 2018 with apparent finding of C. difficile colitis by GI, treated with course of oral vancomycin with improvement in symptoms.  · Patient notes minimal intermittent diarrhea/loose stools on Xeloda requiring occasional dosing of Imodium.  Bowel function recently has been stable, has required infrequent dosing of Imodium recently.  6. Traumatic left tibia/fibular fracture:  · Status post ORIF 12/6/17  · Specimen was sent for pathologic review, negative for evidence of  malignancy  7. Hypercalcemia:  · Suspect hypercalcemia of malignancy, calcium in  10-11 range previously.  · Calcium normalized following initiation of monthly Xgeva on 1/23/18.  8. Chemotherapy-induced mucositis:  · Patient had a minimal degree of mucositis with cycle 2.  The patient has magic mouthwash to use as needed.  No subsequent mucositis.  9. Recurrent UTI, bladder wall thickening on CT:  · Patient had an enterococcal UTI on 3/2/18 sensitive to nitrofurantoin and received treatment, unclear how long.  · Recurrent UTI 3/20/18 with urine culture growing Klebsiella, initially treated with nitrofurantoin, transitioned to Levaquin.  · CT 4/4/18 with diffuse bladder wall thickening with increased nodular thickening at the left base.  Referral to urogynecology Dr. May Johnson.  She was placed on a prophylactic dose of trimethoprim 100 mg daily, bladder wall thickening felt to be related to recent recurrent urinary tract infections.  · Patient with urinary symptoms, treated with course of Macrobid at the end of December 2018, urine culture however was negative 12/31/18.  · Patient was found to have Klebsiella UTI 7/29/2019 which was successfully treated with Macrobid with complete resolution of symptoms.  · CT 8/10/2021 with diffuse bladder wall thickening new from 5/17/2021 (however seen on multiple prior scans).    · UTI on 10/18/2021 with E. coli greater than 100,000 colonies that was pansensitive, treated with Macrobid x7 days  · With ongoing/recurrent UTIs patient was seen by urogynecology and initiated suppressive therapy with trimethoprim 100 mg daily in December 2021.  She has not experienced any further urinary tract infections.  · Today, patient notes that for unclear reason she stopped taking the trimethoprim.  I encouraged her to resume this as prescribed.  10.   Mobility:  · The patient underwent an intensive course of rehabilitation at [facility].  She graduated from her outpatient course November  2018.  · Overall the patient has improved dramatically in terms of mobility, now walking around 1 mile per day without assistance.  11.  Depression:  · The patient is continuing follow-up in the supportive oncology clinic and is currently continuing on Cymbalta 30 mg twice daily as well as gabapentin 600 mg nightly.  · The patient feels that her depression symptoms are currently fairly well controlled.  She has seen some benefit from attending support groups at [facility] which she plans to continue.  · The patient does continue follow-up in supportive oncology clinic, last seen on 12/3/2021.  Symptoms are fairly stable  12. 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  Symptoms did improve and she continued on the same dose.    · Patient was referred to dermatology and has been continuing on triamcinolone 0.1% cream used for 1 week on followed by 1 week off which led to some further improvement.   · Progressive palmar erythema with development of sclerodactyly and effect on dexterity.  Addition of urea-based cream 3 times daily.  · Patient with continued difficulty regarding erythema of her hands that extended onto the dorsal aspect and was causing contractures/sclerodactyly in her fingers affecting her dexterity.  In July 2021, held Xeloda for an additional week and then reduced dose from 1500 mg twice daily down to 1000 mg a.m. and 1500 mg p.m. and continued on a 7-day on followed by 7-day off schedule.  · With reduction in Xeloda dose, slight improvement in erythema involving dorsal aspect of the hands and slight decrease in sclerodactyly  · See above discussion.  Patient symptoms have worsened recently with increased skin peeling in the hands, particularly around the fingertips and extending onto the dorsum of the hand as well.  She  has also experienced increase in skin thickening and sclerodactyly involving her hands with decrease in dexterity.  She is only using her emollient cream twice per day and is not using the triamcinolone cream prescribed by dermatology nor is she using the urea-based cream.  I have asked her to increase frequency of the emollient cream to 3-4 times per day at a minimum and to resume using the triamcinolone cream twice per day.  We will reassess status of her symptoms in 2 weeks when she returns.  If there has been no improvement we will need to consider dose reduction of Xeloda.  13. Evidence of steatosis on scans with mild intermittent elevated liver function studies:  · Liver function studies increased 8/20/19 with ALT 98, AST 70, normal total bilirubin.  · Negative viral hepatitis A, B, and C panel 8/23/18  · Likely related to hepatic steatosis.   · Subsequent improvement in LFTs  · Today, LFTs remain normal  14. Chemotherapy induced leukopenia:  · WBC today  4.48  15. GERD:  · Patient with significant history of reflux  · Patient currently receiving Prilosec 20 mg twice daily and Carafate 1 g 4 times daily  · Patient did follow-up with Dr. Ocasio and is scheduled for EGD and colonoscopy which has been postponed until March 16. Insomnia:  · Patient with prior paradoxical reaction to Benadryl  · Improved previously on temazepam 15 mg nightly as needed.   · Patient noted subsequently that temazepam was having no effect.   · Symptoms currently stable on gabapentin 600 mg nightly  · Patient has been continuing on temazepam 15 mg nightly, does report some effectiveness.    17. Health maintenance:  · Patient notes that she has a history of colon polyps as well as ulcerative colitis and was due for a follow-up colonoscopy on 9/12/2019.  We did discuss there is no necessity to pursue colonoscopy in the setting of her metastatic breast cancer.    · The patient did undergo colonoscopy on 2/7/2020 with findings of muscular  hypertrophy and diverticulosis.   18. Right shoulder pain:  · Patient was evaluated by Dr Forrester.  She has experienced difficulty over a long time frame with right shoulder although she has not complained of this in prior visits to our office.  She reported difficulty with abduction.    · The patient did undergo MRI of her right shoulder on 11/21/2019 at an outside facility showing multiple abnormalities including supraspinatus tendinosis, labral tear but no evidence of metastatic disease.  · Patient developed pain in the left shoulder, was seen by orthopedics and underwent steroid injection with some improvement.  Right shoulder is stable currently.  Patient did undergo physical therapy with some improvement.  She continues to use tramadol 50 mg every 8-12 hours as needed.  19. Ocular changes in part related to Xeloda:  · Patient experienced a mild degree of blurred vision as well as burning and pruritus.  · She was seen by her ophthalmologist and was placed on xiidra ophthalmic drops which have helped.  · Likely both issues are to some extent related to Xeloda.  · Symptoms did worsen and patient was seen by a new ophthalmologist at [facility].  She is now using an ocular lubricant at night in addition to artificial tears which have helped.  20. Elevated glucose:  · It is noted the patient's glucose at the last few visits has been in the high 100 range, postprandial.  · She has had a hemoglobin A1c that has been in the high 5-6 range in the past, on 5/1/2019 at 5.7  · Hemoglobin A1c on 5/7/2021 was improved at 5.2  21. Possible loosening of pedicle screw at T3:  · CT cervical spine 5/17/2021 showed loosening of the left pedicle screw at T3.  Patient referred to orthopedics and was seen by Dr. Nayak who felt that there were no concerning findings on the scan  22. COVID-19 vaccination  · Patient received the Pfizer vaccine, second dose administered on 4/2/2021 without side effects.  · Patient  received her third dose Pfizer COVID-19 injection in August 2021     Plan:  1. Continue Xeloda 1000 mg a.m., 1500 mg in the p.m. 7 days on followed by 7 days off  2. Monthly Xgeva today  3. Continue Eliquis 5 mg twice daily, refill prescription sent today.  4. The patient will increase use of emollient cream from 2 times daily up to 3-4 times daily and add in use of triamcinolone cream (provided by dermatology) twice daily for the next 2 weeks  5. Continue omeprazole 20 mg twice daily  6. Refill prescription sent for Carafate 1 g 4 times daily   7. Continue ocular lubricating gel nightly per ophthalmology  8. Continue Provigil 100 mg daily and Cymbalta 30 mg twice daily per supportive oncology clinic.  Patient continues routine follow-up with supportive oncology.  9. Continue temazepam 15 mg nightly.  10. Patient was asked to resume taking trimethoprim 100 mg daily that was prescribed by urogynecology for ongoing suppressive therapy for UTIs  11. Patient notes that EGD/colonoscopy has been postponed until March.  12. In 2 weeks nurse practitioner visit with CBC, CMP  13. In 4 weeks nurse practitioner visit with CBC, CMP, magnesium, phosphorus and Xgeva  14. In 7 weeks CT chest abdomen pelvis  15. Later that week following scans MD visit with no labs and scan review  16. In 8 weeks CBC, CMP, magnesium, phosphorus and Xgeva due     Patient continuing on high risk medication requiring intensive monitoring.              I did spend 40 minutes in total time caring for the patient today, time spent in review of records, face-to-face consultation, coordination of care, placement of orders, completion of documentation

## 2022-02-07 ENCOUNTER — LAB (OUTPATIENT)
Dept: OTHER | Facility: HOSPITAL | Age: 62
End: 2022-02-07

## 2022-02-07 ENCOUNTER — OFFICE VISIT (OUTPATIENT)
Dept: ONCOLOGY | Facility: CLINIC | Age: 62
End: 2022-02-07

## 2022-02-07 ENCOUNTER — INFUSION (OUTPATIENT)
Dept: ONCOLOGY | Facility: HOSPITAL | Age: 62
End: 2022-02-07

## 2022-02-07 VITALS
HEIGHT: 61 IN | RESPIRATION RATE: 16 BRPM | SYSTOLIC BLOOD PRESSURE: 150 MMHG | BODY MASS INDEX: 36.14 KG/M2 | TEMPERATURE: 97.1 F | OXYGEN SATURATION: 97 % | DIASTOLIC BLOOD PRESSURE: 82 MMHG | WEIGHT: 191.4 LBS | HEART RATE: 94 BPM

## 2022-02-07 DIAGNOSIS — C50.811 MALIGNANT NEOPLASM OF OVERLAPPING SITES OF RIGHT BREAST IN FEMALE, ESTROGEN RECEPTOR NEGATIVE: Primary | ICD-10-CM

## 2022-02-07 DIAGNOSIS — Z17.1 MALIGNANT NEOPLASM OF OVERLAPPING SITES OF RIGHT BREAST IN FEMALE, ESTROGEN RECEPTOR NEGATIVE: Primary | ICD-10-CM

## 2022-02-07 DIAGNOSIS — Z17.1 MALIGNANT NEOPLASM OF OVERLAPPING SITES OF RIGHT BREAST IN FEMALE, ESTROGEN RECEPTOR NEGATIVE: ICD-10-CM

## 2022-02-07 DIAGNOSIS — C50.811 MALIGNANT NEOPLASM OF OVERLAPPING SITES OF RIGHT BREAST IN FEMALE, ESTROGEN RECEPTOR NEGATIVE: ICD-10-CM

## 2022-02-07 LAB
ALBUMIN SERPL-MCNC: 4.1 G/DL (ref 3.5–5.2)
ALBUMIN/GLOB SERPL: 1.7 G/DL
ALP SERPL-CCNC: 61 U/L (ref 39–117)
ALT SERPL W P-5'-P-CCNC: 15 U/L (ref 1–33)
ANION GAP SERPL CALCULATED.3IONS-SCNC: 6.4 MMOL/L (ref 5–15)
AST SERPL-CCNC: 22 U/L (ref 1–32)
BASOPHILS # BLD AUTO: 0.04 10*3/MM3 (ref 0–0.2)
BASOPHILS NFR BLD AUTO: 0.9 % (ref 0–1.5)
BILIRUB SERPL-MCNC: 0.4 MG/DL (ref 0–1.2)
BUN SERPL-MCNC: 23 MG/DL (ref 8–23)
BUN/CREAT SERPL: 22.8 (ref 7–25)
CALCIUM SPEC-SCNC: 9.2 MG/DL (ref 8.6–10.5)
CHLORIDE SERPL-SCNC: 102 MMOL/L (ref 98–107)
CO2 SERPL-SCNC: 31.6 MMOL/L (ref 22–29)
CREAT SERPL-MCNC: 1.01 MG/DL (ref 0.57–1)
DEPRECATED RDW RBC AUTO: 62.4 FL (ref 37–54)
EOSINOPHIL # BLD AUTO: 0.25 10*3/MM3 (ref 0–0.4)
EOSINOPHIL NFR BLD AUTO: 5.6 % (ref 0.3–6.2)
ERYTHROCYTE [DISTWIDTH] IN BLOOD BY AUTOMATED COUNT: 17.3 % (ref 12.3–15.4)
GFR SERPL CREATININE-BSD FRML MDRD: 56 ML/MIN/1.73
GLOBULIN UR ELPH-MCNC: 2.4 GM/DL
GLUCOSE SERPL-MCNC: 94 MG/DL (ref 65–99)
HCT VFR BLD AUTO: 36.6 % (ref 34–46.6)
HGB BLD-MCNC: 11.7 G/DL (ref 12–15.9)
IMM GRANULOCYTES # BLD AUTO: 0.02 10*3/MM3 (ref 0–0.05)
IMM GRANULOCYTES NFR BLD AUTO: 0.4 % (ref 0–0.5)
LYMPHOCYTES # BLD AUTO: 1.17 10*3/MM3 (ref 0.7–3.1)
LYMPHOCYTES NFR BLD AUTO: 26.1 % (ref 19.6–45.3)
MAGNESIUM SERPL-MCNC: 2.2 MG/DL (ref 1.6–2.4)
MCH RBC QN AUTO: 31.6 PG (ref 26.6–33)
MCHC RBC AUTO-ENTMCNC: 32 G/DL (ref 31.5–35.7)
MCV RBC AUTO: 98.9 FL (ref 79–97)
MONOCYTES # BLD AUTO: 0.64 10*3/MM3 (ref 0.1–0.9)
MONOCYTES NFR BLD AUTO: 14.3 % (ref 5–12)
NEUTROPHILS NFR BLD AUTO: 2.36 10*3/MM3 (ref 1.7–7)
NEUTROPHILS NFR BLD AUTO: 52.7 % (ref 42.7–76)
NRBC BLD AUTO-RTO: 0 /100 WBC (ref 0–0.2)
PHOSPHATE SERPL-MCNC: 4 MG/DL (ref 2.5–4.5)
PLATELET # BLD AUTO: 253 10*3/MM3 (ref 140–450)
PMV BLD AUTO: 10.3 FL (ref 6–12)
POTASSIUM SERPL-SCNC: 5 MMOL/L (ref 3.5–5.2)
PROT SERPL-MCNC: 6.5 G/DL (ref 6–8.5)
RBC # BLD AUTO: 3.7 10*6/MM3 (ref 3.77–5.28)
SODIUM SERPL-SCNC: 140 MMOL/L (ref 136–145)
WBC NRBC COR # BLD: 4.48 10*3/MM3 (ref 3.4–10.8)

## 2022-02-07 PROCEDURE — 25010000002 DENOSUMAB 120 MG/1.7ML SOLUTION: Performed by: INTERNAL MEDICINE

## 2022-02-07 PROCEDURE — 80053 COMPREHEN METABOLIC PANEL: CPT | Performed by: INTERNAL MEDICINE

## 2022-02-07 PROCEDURE — 85025 COMPLETE CBC W/AUTO DIFF WBC: CPT | Performed by: INTERNAL MEDICINE

## 2022-02-07 PROCEDURE — 99215 OFFICE O/P EST HI 40 MIN: CPT | Performed by: INTERNAL MEDICINE

## 2022-02-07 PROCEDURE — 36415 COLL VENOUS BLD VENIPUNCTURE: CPT

## 2022-02-07 PROCEDURE — 96372 THER/PROPH/DIAG INJ SC/IM: CPT

## 2022-02-07 PROCEDURE — 84100 ASSAY OF PHOSPHORUS: CPT | Performed by: INTERNAL MEDICINE

## 2022-02-07 PROCEDURE — 83735 ASSAY OF MAGNESIUM: CPT | Performed by: INTERNAL MEDICINE

## 2022-02-07 RX ORDER — SUCRALFATE ORAL 1 G/10ML
1 SUSPENSION ORAL 4 TIMES DAILY
Qty: 420 ML | Refills: 2 | Status: SHIPPED | OUTPATIENT
Start: 2022-02-07 | End: 2022-09-13 | Stop reason: ALTCHOICE

## 2022-02-07 RX ORDER — TRAMADOL HYDROCHLORIDE 50 MG/1
50 TABLET ORAL EVERY 8 HOURS PRN
Qty: 90 TABLET | Refills: 1 | Status: SHIPPED | OUTPATIENT
Start: 2022-02-07 | End: 2022-05-10

## 2022-02-07 RX ADMIN — DENOSUMAB 120 MG: 120 INJECTION SUBCUTANEOUS at 11:21

## 2022-02-17 NOTE — PROGRESS NOTES
"She willChi Complaint  Previous Stage Ib ([id number]) ER/AK positive, HER-2/peter negative right breast cancer with subsequent metastatic disease identified 10/8/2017, history of right pulmonary embolism, cancer related pain, chemotherapy-induced diarrhea, chemotherapy-induced mucositis, chemotherapy-induced hand-foot syndrome    Subjective        History of Present Illness  The patient returns today for follow-up.  She continues on Xeloda 1000 mg in the morning, 1500 mg in the evening for 7 days on followed by 7 days off.  She started her on week today.  She also continues to receive monthly Xgeva.  She also continues on Eliquis 5 mg twice daily and is tolerating this well without signs or symptoms of bleeding.    She has been utilizing emollient creams 3-4 times daily, along with triamcinolone cream twice daily as prescribed by dermatology.  She has noticed improvement in her hand-foot symptoms with more aggressive use of the above creams.  Her sclerodactyly remains stable.  She has intermittent diarrhea, controlled with Imodium as needed.  Her reflux symptoms remain stable, she continues on omeprazole 20 mg twice daily and carefree 1 g 4 times daily.  She continues to experience pain in her shoulders, controlled with tramadol 50 mg every 8 hours as needed.  She did receive cortisone injections for shoulder pain, and has noted some improvement.  She is eating and drinking adequately.  She denies fever or chills.  She denies nausea or vomiting.  She has no new concerns today.    Objective   Vital Signs:   /65   Pulse 101   Temp 97.2 °F (36.2 °C) (Temporal)   Resp 17   Ht 156 cm (61.42\")   Wt 85 kg (187 lb 8 oz)   SpO2 97%   BMI 34.95 kg/m²       Physical Exam  Constitutional:       Appearance: She is well-developed.   Eyes:      Conjunctiva/sclera: Conjunctivae normal.   Neck:      Thyroid: No thyromegaly.   Cardiovascular:      Rate and Rhythm: Normal rate and regular rhythm.      Heart sounds: No " murmur heard.  No friction rub. No gallop.    Pulmonary:      Effort: No respiratory distress.      Breath sounds: Normal breath sounds.   Chest:   Breasts:      Right: No supraclavicular adenopathy.      Left: No supraclavicular adenopathy.       Abdominal:      General: Bowel sounds are normal. There is no distension.      Palpations: Abdomen is soft.      Tenderness: There is no abdominal tenderness.   Lymphadenopathy:      Head:      Right side of head: No submandibular adenopathy.      Cervical: No cervical adenopathy.      Upper Body:      Right upper body: No supraclavicular adenopathy.      Left upper body: No supraclavicular adenopathy.   Skin:     General: Skin is warm and dry.      Findings: Rash present.      Comments: Patient continues with palmar erythema with skin cracking and peeling.  The degree of involvement has improved since her last visit.  The degree of sclerodactyly and general inflammation has remained stable with slightly reduced movement of her fingers.  The amount of skin peeling has improved.  This does extend onto the dorsal aspect of the hands to some extent, which has also improved.  Pictures available in media.   Neurological:      Mental Status: She is alert and oriented to person, place, and time.      Cranial Nerves: No cranial nerve deficit.      Motor: No abnormal muscle tone.      Deep Tendon Reflexes: Reflexes normal.   Psychiatric:         Behavior: Behavior normal.        Result Review : Reviewed CBC, CMP, mag, phosphorus level from today.     Assessment and Plan     1. Previous Stage Ib ([id number]) right breast cancer:  · Diagnosed May 2010 with excisional biopsy for invasive ductal carcinoma, 1.3 cm, grade 2, ER 90%, SD 80%, HER-2/peter negative (1+ IHC).    · Subsequent right mastectomy in July 2010 with no residual breast malignancy, 1/5 sentinel lymph node with micrometastasis (0.25 mm).    · Treated in the [facility] system with adjuvant AC ×4 cycles in 2010 (no taxanes  administered due to underlying Charcot-Saloni-Tooth with peripheral neuropathy).    · Adjuvant Femara (postmenopausal) initiated October 2010 with plan to treat ×10 years.    · Genetic testing reportedly negative.    · Developed osteopenia treated with Prolia beginning 2/27/13. Subsequently discontinued due to identification of metastatic disease.  2. Recurrent/metastatic disease identified 10/8/17:  · Disease involving thoracic spine with cord compression at T6, lumbosacral involvement, sternal and right sternoclavicular involvement.    · Femara discontinued in 10/2017.    · Radiation administered (in the [facility] system) to the thoracic spine beginning 10/19/17 treating T3-T9 to a dose of 24 gray in 6 fractions.  · Evaluation with MRI 12/8/17 showing persistent T6 cord compression with persistent neurologic compromise requiring surgical treatment 12/11/17 with T6 laminectomy/corpectomy and T3-T9 fusion.  Pathology with metastatic carcinoma of breast origin, ER negative, HI negative, HER-2/peter negative (1+ IHC).  · Additional staging evaluation 12/8/17 with no evidence of visceral metastatic disease, bone scan showing involvement of thoracic spine, sternum, left humerus, mid frontal bone.  No plane film correlate of left humerus lesion.  MRI lumbar spine with small intradural L3 metastasis.  CA 15-3 12/6/17- 17.  · Palliative radiation therapy to L3 dural metastasis and left humerus initiated 1/15/18 (10 fractions), completed 1/26/18.  · Hypercalcemia of malignancy with calcium in the 10-11 range.  · Initiation of monthly Xgeva 1/23/18.  · Baseline CT scan 1/30/18 with no evidence of visceral involvement.  Cluster of nodular opacities in the right lower lobe suspected to be infectious or related to bronchiolitis. Bone scan 1/30/18 showed postsurgical change in the thoracic spine, stable uptake in the frontal bone, no new areas of disease.  · Initiation of palliative oral single agent Xeloda 2/7/18 2000 mg a.m., 1500  mg p.m. for 14/21 days.   · Following 3 cycles xeloda, bone scan 4/4/18 showed no change from the prior study.  CT scan 4/4/18 showed a small pericardial effusion of unclear significance as well as a subcutaneous nodule in the right lateral chest wall.  Subsequent evaluation with echocardiogram 4/17/18 showed no evidence of pericardial effusion.  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  We discussed the possibility of surgical excision of the right subcutaneous chest wall lesion for more definitive diagnosis.  Reviewed previous CT images dating back to 12/8/17 and the lesion was present even at that time measuring around 1.7 cm although not commented on in the radiology report.  As this appears to be an indolent low-grade process unrelated to her breast cancer, recommendee foregoing surgical excision at this time and monitoring this area on future scans.  The patient agreed.    · Following 6 cycles of Xeloda, CT 6/6/18  showed stable findings, no evidence of progressive disease.  There was a comment regarding subcutaneous abnormality in the anterior abdominal wall and this was related to Lovenox injection sites.  Bone scan 6/6/18 showed no interval change.   · CT scan and bone scan 8/13/18 following 9 cycles of Xeloda showed stable findings with no evidence of significant visceral metastases.  Her bone lesions appear stable on bone scan.  The spindle cell neoplasm in her right chest wall actually decreased in size from 2 cm down to 1.6 cm.    · The patient experienced some symptoms of diarrhea, anorexia, generalized weakness during cycle 9 Xeloda it was unclear whether this was related to a viral gastroenteritis or toxicity from treatment.  Symptoms recurred during cycle 10 and treatment was cut short by 2 days.  Symptoms attributed to Xeloda.  With cycle 11, dose and  schedule altered to 1500 mg twice daily for 7 days on followed by 7 days off .  · CT scan 9/9/2020 with no significant changes.  Bone scan 9/9/2020 read as unchanged from prior studies however did note an area of slight activity in the medial left femur.  Contacted radiology and although this was not noted on prior reports appears to have been present.  Subsequent MRI left femur 9/21/2020 with cortical thickening and periosteal edema left iliac us muscle insertion to the medial left femur with no evidence of metastatic disease, favored to represent periosteal change secondary to insertional tendinitis.  · Severe hand-foot symptoms causing sclerodactyly and limitation in finger movement prompted change in dosing in July 2021 with Xeloda decreased to 1000 mg a.m., 1500 mg p.m. for 7 days on followed by 7 days off.  · Most recent 3-month interval scans from 1/31/2022 with CT chest abdomen pelvis, bone scan performed.  CT scans showed no significant change from prior studies.  Bone scan however showed a new increased focus of activity right inferior ischio pubic ramus corresponding to area of subtle but increasing sclerosis on CT.  Unclear whether this is a new metastatic lesion or treatment response around previously occult lesion.  Decision to pursue 2-month interval scans.  · The patient has the above scans to review with CT chest abdomen pelvis and bone scan from 1/31/2022 at a 3-month interval.  She does continue to have difficulty on treatment with hand-foot symptoms.  Her hands are much worse than her feet.  She has had some increase in erythema and skin peeling which has produced an increase in sclerodactyly and effect on her dexterity.  She is not using her emollient cream as frequently, currently only twice daily and is not using the triamcinolone cream prescribed by dermatology nor is she using the urea-based cream although she cannot tell much difference from that treatment.  We discussed continuation of  Xeloda at the current dose and schedule and recommended an increase in use of her emollient cream at least 3-4 times per day and to resume using the triamcinolone cream twice daily for now.  She is to only use the triamcinolone for 2 weeks at a time per dermatology.   We reviewed her scans with overall fairly stable findings.  The only concern is on the bone scan which showed a new area of activity in the right inferior ischio pubic ramus.  This is a small area of activity that corresponds to an area of increasing sclerosis on CT.  Unclear if this is a lesion that is treated or whether this could be a new lesion developing.  She is asymptomatic.  We discussed pursuit of repeat CT and bone scan at a 2-month interval to see if there has been any further change in this area or in any other areas.  If this remains a focal area suspicious for solitary progression we would consider potentially radiating the site and continuing Xeloda if the scans are otherwise stable.  For now we will continue current treatment.    · 2/21/2022, patient continues on Xeloda 1000 mg in the morning, 1500 mg in the evening 7 days on 7 days off.  She starts her on week today.  Her hand-foot symptoms have improved with more aggressive use of emollient cream, 3-4 times daily along with triamcinolone cream twice daily.  Sclerodactyly and dexterity remains stable.  We will continue Xeloda at current dose and monitor very closely.  3. Right pulmonary embolism:  · Diagnosed on CT angiogram 10/21/17 involving small right lower lobe pulmonary artery.  Lower extremity Dopplers negative.  · Bilateral lower extremity Dopplers negative again 12/5/17.  · Received chronic Lovenox 1 mg/kg twice per day, transition to oral Eliquis in February 2019, continuing on Eliquis 5 mg twice daily.  · The patient has had no bleeding difficulties on anticoagulation  4. Cancer related pain:  · Previously receiving Duragesic 50 µg patch every 72 hours along with Dilaudid 4  mg as needed for breakthrough pain  · The patient's pain improved over time and she was able to discontinue both Duragesic and Dilaudid in the interval.  · Patient does take occasional Flexeril at bedtime due to back spasm/pain when she has been more active.  · The patient does have some occasional aggravation of her chronic back pain and does use tramadol 50 mg every 8 hours as needed  · Patient's pain is stable.  She does continue to use tramadol 50 mg every 8-12 hours as needed which has been effective.  The patient does have chronic difficulty with pain involving her low back, left shoulder.  Physical therapy has helped to produce some relief.   · 2/21/2022, patient continues on tramadol 50 mg every 8-12 hours as needed.  Typically using this once nightly.  She also received cortisone injections, and noticed some improvement.  Pain today is stable.   5. Chemotherapy-induced diarrhea with subsequent C. difficile colitis in the setting of previous ulcerative colitis:  · Patient with reported history of ulcerative colitis, is not on active therapy.  · The patient developed initial diarrhea related to Xeloda at regular dosing.  · Symptoms improved on reduced dose Xeloda  · Flare of symptoms in October 2018 with apparent finding of C. difficile colitis by GI, treated with course of oral vancomycin with improvement in symptoms.  · Patient notes minimal intermittent diarrhea/loose stools on Xeloda requiring occasional dosing of Imodium.  Bowel function recently has been stable, has required infrequent dosing of Imodium recently.  6. Traumatic left tibia/fibular fracture:  · Status post ORIF 12/6/17  · Specimen was sent for pathologic review, negative for evidence of malignancy  7. Hypercalcemia:  · Suspect hypercalcemia of malignancy, calcium in  10-11 range previously.  · Calcium normalized following initiation of monthly Xgeva on 1/23/18.  8. Chemotherapy-induced mucositis:  · Patient had a minimal degree of  mucositis with cycle 2.  The patient has magic mouthwash to use as needed.  No subsequent mucositis.  9. Recurrent UTI, bladder wall thickening on CT:  · Patient had an enterococcal UTI on 3/2/18 sensitive to nitrofurantoin and received treatment, unclear how long.  · Recurrent UTI 3/20/18 with urine culture growing Klebsiella, initially treated with nitrofurantoin, transitioned to Levaquin.  · CT 4/4/18 with diffuse bladder wall thickening with increased nodular thickening at the left base.  Referral to urogynecology Dr. May Johnson.  She was placed on a prophylactic dose of trimethoprim 100 mg daily, bladder wall thickening felt to be related to recent recurrent urinary tract infections.  · Patient with urinary symptoms, treated with course of Macrobid at the end of December 2018, urine culture however was negative 12/31/18.  · Patient was found to have Klebsiella UTI 7/29/2019 which was successfully treated with Macrobid with complete resolution of symptoms.  · CT 8/10/2021 with diffuse bladder wall thickening new from 5/17/2021 (however seen on multiple prior scans).    · UTI on 10/18/2021 with E. coli greater than 100,000 colonies that was pansensitive, treated with Macrobid x7 days  · With ongoing/recurrent UTIs patient was seen by urogynecology and initiated suppressive therapy with trimethoprim 100 mg daily in December 2021.  She has not experienced any further urinary tract infections.  · 2/7/2022, patient notes that for unclear reason she stopped taking the trimethoprim.  Encouraged her to resume this as prescribed.  · 2/21/2022, patient has resumed trimethoprim per urogynecology.   10.   Mobility:  · The patient underwent an intensive course of rehabilitation at [facility].  She graduated from her outpatient course November 2018.  · Overall the patient has improved dramatically in terms of mobility, now walking around 1 mile per day without assistance.  11.  Depression:  · The patient is continuing follow-up  in the supportive oncology clinic and is currently continuing on Cymbalta 30 mg twice daily as well as gabapentin 600 mg nightly.  · The patient feels that her depression symptoms are currently fairly well controlled.  She has seen some benefit from attending support groups at [employer] which she plans to continue.  · The patient does continue follow-up in supportive oncology clinic, last seen on 12/3/2021.  Symptoms are fairly stable  12. 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  Symptoms did improve and she continued on the same dose.    · Patient was referred to dermatology and has been continuing on triamcinolone 0.1% cream used for 1 week on followed by 1 week off which led to some further improvement.   · Progressive palmar erythema with development of sclerodactyly and effect on dexterity.  Addition of urea-based cream 3 times daily.  · Patient with continued difficulty regarding erythema of her hands that extended onto the dorsal aspect and was causing contractures/sclerodactyly in her fingers affecting her dexterity.  In July 2021, held Xeloda for an additional week and then reduced dose from 1500 mg twice daily down to 1000 mg a.m. and 1500 mg p.m. and continued on a 7-day on followed by 7-day off schedule.  · With reduction in Xeloda dose, slight improvement in erythema involving dorsal aspect of the hands and slight decrease in sclerodactyly  · Patient symptoms have worsened recently with increased skin peeling in the hands, particularly around the fingertips and extending onto the dorsum of the hand as well.  She has also experienced increase in skin thickening and sclerodactyly involving her hands with decrease in dexterity.  She is only using her emollient cream twice per day and is not using the triamcinolone cream  prescribed by dermatology nor is she using the urea-based cream. Asked her to increase frequency of the emollient cream to 3-4 times per day at a minimum and to resume using the triamcinolone cream twice per day.    · 2/21/2022, patient has been more aggressive with creams utilizing emollient cream 3-4 times daily and triamcinolone cream twice daily.  She has noticed improvement in hand-foot symptoms.  Reminded patient she may only use triamcinolone cream 2 weeks at a time, and she verbalized understanding.  She is going to continue aggressive use of emollient cream 3-4 times daily.  13. Evidence of steatosis on scans with mild intermittent elevated liver function studies:  · Liver function studies increased 8/20/19 with ALT 98, AST 70, normal total bilirubin.  · Negative viral hepatitis A, B, and C panel 8/23/18  · Likely related to hepatic steatosis.   · Subsequent improvement in LFTs  · Today, LFTs remain normal   14. Chemotherapy induced leukopenia:  · WBC today  5.22.  15. GERD:  · Patient with significant history of reflux  · Patient currently receiving Prilosec 20 mg twice daily and Carafate 1 g 4 times daily  · Patient did follow-up with Dr. Ocasio and is scheduled for EGD and colonoscopy which has been postponed until March 16. Insomnia:  · Patient with prior paradoxical reaction to Benadryl  · Improved previously on temazepam 15 mg nightly as needed.   · Patient noted subsequently that temazepam was having no effect.   · Symptoms currently stable on gabapentin 600 mg nightly  · Patient has been continuing on temazepam 15 mg nightly, does report some effectiveness.    17. Health maintenance:  · Patient notes that she has a history of colon polyps as well as ulcerative colitis and was due for a follow-up colonoscopy on 9/12/2019.  We did discuss there is no necessity to pursue colonoscopy in the setting of her metastatic breast cancer.    · The patient did undergo colonoscopy on 2/7/2020 with findings of  muscular hypertrophy and diverticulosis.   18. Right shoulder pain:  · Patient was evaluated by Dr Forrester.  She has experienced difficulty over a long time frame with right shoulder although she has not complained of this in prior visits to our office.  She reported difficulty with abduction.    · The patient did undergo MRI of her right shoulder on 11/21/2019 at an outside facility showing multiple abnormalities including supraspinatus tendinosis, labral tear but no evidence of metastatic disease.  · Patient developed pain in the left shoulder, was seen by orthopedics and underwent steroid injection with some improvement.  Right shoulder is stable currently.  Patient did undergo physical therapy with some improvement.  She continues to use tramadol 50 mg every 8-12 hours as needed.  19. Ocular changes in part related to Xeloda:  · Patient experienced a mild degree of blurred vision as well as burning and pruritus.  · She was seen by her ophthalmologist and was placed on xiidra ophthalmic drops which have helped.  · Likely both issues are to some extent related to Xeloda.  · Symptoms did worsen and patient was seen by a new ophthalmologist at [facility].  She is now using an ocular lubricant at night in addition to artificial tears which have helped.  20. Elevated glucose:  · It is noted the patient's glucose at the last few visits has been in the high 100 range, postprandial.  · She has had a hemoglobin A1c that has been in the high 5-6 range in the past, on 5/1/2019 at 5.7  · Hemoglobin A1c on 5/7/2021 was improved at 5.2  21. Possible loosening of pedicle screw at T3:  · CT cervical spine 5/17/2021 showed loosening of the left pedicle screw at T3.  Patient referred to orthopedics and was seen by Dr. Nayak who felt that there were no concerning findings on the scan  22. COVID-19 vaccination  · Patient received the Pfizer vaccine, second dose administered on 4/2/2021 without side  effects.  · Patient received her third dose Pfizer COVID-19 injection in August 2021  · 2/21/2022 Per patient request, we will draw COVID-19 antibodies today, these are pending.     Plan:   1. Continue Xeloda 1000 mg a.m., 1500 mg in the p.m. 7 days on followed by 7 days off.  2. Continue Xgeva monthly, next due 3/7/2022.  3. Continue Eliquis 5 mg twice daily.  4. Continue emollient cream at least 3-4 times daily.  Patient also has triamcinolone cream (provided by dermatology) twice daily, but my only take this 2 weeks at a time.  5. Continue omeprazole 20 mg twice daily  6. Continue Carafate 1 g 4 times daily   7. Continue ocular lubricating gel nightly per ophthalmology  8. Continue Provigil 100 mg daily and Cymbalta 30 mg twice daily per supportive oncology clinic.  Patient continues routine follow-up with supportive oncology.  9. Continue temazepam 15 mg nightly.  10. Continue trimethoprim 100 mg daily that was prescribed by urogynecology for ongoing suppressive therapy for UTIs  11. Patient notes that EGD/colonoscopy has been postponed until March.  12. In 2 weeks nurse practitioner visit with CBC, CMP, magnesium, phosphorus and Xgeva  13. In 5 weeks CT chest abdomen pelvis  14. Later that week following scans MD visit with no labs and scan review  15. In 6 weeks CBC, CMP, magnesium, phosphorus and Xgeva due  16. COVID-19 antibodies drawn today per patient request.     Patient continuing on high risk medication requiring intensive monitoring.           Reva Galo, LISA  02/21/2022

## 2022-02-21 ENCOUNTER — OFFICE VISIT (OUTPATIENT)
Dept: ONCOLOGY | Facility: CLINIC | Age: 62
End: 2022-02-21

## 2022-02-21 ENCOUNTER — LAB (OUTPATIENT)
Dept: OTHER | Facility: HOSPITAL | Age: 62
End: 2022-02-21

## 2022-02-21 VITALS
HEART RATE: 101 BPM | DIASTOLIC BLOOD PRESSURE: 65 MMHG | OXYGEN SATURATION: 97 % | TEMPERATURE: 97.2 F | WEIGHT: 187.5 LBS | RESPIRATION RATE: 17 BRPM | SYSTOLIC BLOOD PRESSURE: 116 MMHG | HEIGHT: 61 IN | BODY MASS INDEX: 35.4 KG/M2

## 2022-02-21 DIAGNOSIS — C79.51 BONE METASTASES: ICD-10-CM

## 2022-02-21 DIAGNOSIS — Z17.1 MALIGNANT NEOPLASM OF OVERLAPPING SITES OF RIGHT BREAST IN FEMALE, ESTROGEN RECEPTOR NEGATIVE: Primary | ICD-10-CM

## 2022-02-21 DIAGNOSIS — Z79.899 HIGH RISK MEDICATION USE: ICD-10-CM

## 2022-02-21 DIAGNOSIS — Z17.1 MALIGNANT NEOPLASM OF OVERLAPPING SITES OF RIGHT BREAST IN FEMALE, ESTROGEN RECEPTOR NEGATIVE: ICD-10-CM

## 2022-02-21 DIAGNOSIS — L27.1 HAND FOOT SYNDROME: ICD-10-CM

## 2022-02-21 DIAGNOSIS — L27.1 PALMAR PLANTAR ERYTHRODYSAESTHESIA DUE TO CYTOTOXIC THERAPY: ICD-10-CM

## 2022-02-21 DIAGNOSIS — G89.3 CANCER ASSOCIATED PAIN: ICD-10-CM

## 2022-02-21 DIAGNOSIS — Z79.01 ANTICOAGULANT LONG-TERM USE: ICD-10-CM

## 2022-02-21 DIAGNOSIS — C50.811 MALIGNANT NEOPLASM OF OVERLAPPING SITES OF RIGHT BREAST IN FEMALE, ESTROGEN RECEPTOR NEGATIVE: ICD-10-CM

## 2022-02-21 DIAGNOSIS — C50.811 MALIGNANT NEOPLASM OF OVERLAPPING SITES OF RIGHT BREAST IN FEMALE, ESTROGEN RECEPTOR NEGATIVE: Primary | ICD-10-CM

## 2022-02-21 LAB
ALBUMIN SERPL-MCNC: 4 G/DL (ref 3.5–5.2)
ALBUMIN/GLOB SERPL: 1.7 G/DL
ALP SERPL-CCNC: 64 U/L (ref 39–117)
ALT SERPL W P-5'-P-CCNC: 12 U/L (ref 1–33)
ANION GAP SERPL CALCULATED.3IONS-SCNC: 8.7 MMOL/L (ref 5–15)
AST SERPL-CCNC: 15 U/L (ref 1–32)
BASOPHILS # BLD AUTO: 0.05 10*3/MM3 (ref 0–0.2)
BASOPHILS NFR BLD AUTO: 1 % (ref 0–1.5)
BILIRUB SERPL-MCNC: 0.4 MG/DL (ref 0–1.2)
BUN SERPL-MCNC: 29 MG/DL (ref 8–23)
BUN/CREAT SERPL: 28.7 (ref 7–25)
CALCIUM SPEC-SCNC: 9.2 MG/DL (ref 8.6–10.5)
CHLORIDE SERPL-SCNC: 102 MMOL/L (ref 98–107)
CO2 SERPL-SCNC: 29.3 MMOL/L (ref 22–29)
CREAT SERPL-MCNC: 1.01 MG/DL (ref 0.57–1)
DEPRECATED RDW RBC AUTO: 63.4 FL (ref 37–54)
EOSINOPHIL # BLD AUTO: 0.22 10*3/MM3 (ref 0–0.4)
EOSINOPHIL NFR BLD AUTO: 4.2 % (ref 0.3–6.2)
ERYTHROCYTE [DISTWIDTH] IN BLOOD BY AUTOMATED COUNT: 18.1 % (ref 12.3–15.4)
GFR SERPL CREATININE-BSD FRML MDRD: 56 ML/MIN/1.73
GLOBULIN UR ELPH-MCNC: 2.4 GM/DL
GLUCOSE SERPL-MCNC: 138 MG/DL (ref 65–99)
HCT VFR BLD AUTO: 38.7 % (ref 34–46.6)
HGB BLD-MCNC: 12.4 G/DL (ref 12–15.9)
IMM GRANULOCYTES # BLD AUTO: 0.03 10*3/MM3 (ref 0–0.05)
IMM GRANULOCYTES NFR BLD AUTO: 0.6 % (ref 0–0.5)
LYMPHOCYTES # BLD AUTO: 1.41 10*3/MM3 (ref 0.7–3.1)
LYMPHOCYTES NFR BLD AUTO: 27 % (ref 19.6–45.3)
MAGNESIUM SERPL-MCNC: 2.4 MG/DL (ref 1.6–2.4)
MCH RBC QN AUTO: 31.7 PG (ref 26.6–33)
MCHC RBC AUTO-ENTMCNC: 32 G/DL (ref 31.5–35.7)
MCV RBC AUTO: 99 FL (ref 79–97)
MONOCYTES # BLD AUTO: 0.53 10*3/MM3 (ref 0.1–0.9)
MONOCYTES NFR BLD AUTO: 10.2 % (ref 5–12)
NEUTROPHILS NFR BLD AUTO: 2.98 10*3/MM3 (ref 1.7–7)
NEUTROPHILS NFR BLD AUTO: 57 % (ref 42.7–76)
NRBC BLD AUTO-RTO: 0 /100 WBC (ref 0–0.2)
PHOSPHATE SERPL-MCNC: 3 MG/DL (ref 2.5–4.5)
PLATELET # BLD AUTO: 275 10*3/MM3 (ref 140–450)
PMV BLD AUTO: 9.9 FL (ref 6–12)
POTASSIUM SERPL-SCNC: 4.5 MMOL/L (ref 3.5–5.2)
PROT SERPL-MCNC: 6.4 G/DL (ref 6–8.5)
RBC # BLD AUTO: 3.91 10*6/MM3 (ref 3.77–5.28)
SODIUM SERPL-SCNC: 140 MMOL/L (ref 136–145)
WBC NRBC COR # BLD: 5.22 10*3/MM3 (ref 3.4–10.8)

## 2022-02-21 PROCEDURE — 83735 ASSAY OF MAGNESIUM: CPT | Performed by: INTERNAL MEDICINE

## 2022-02-21 PROCEDURE — 99214 OFFICE O/P EST MOD 30 MIN: CPT | Performed by: NURSE PRACTITIONER

## 2022-02-21 PROCEDURE — 86769 SARS-COV-2 COVID-19 ANTIBODY: CPT

## 2022-02-21 PROCEDURE — 85025 COMPLETE CBC W/AUTO DIFF WBC: CPT | Performed by: INTERNAL MEDICINE

## 2022-02-21 PROCEDURE — 36415 COLL VENOUS BLD VENIPUNCTURE: CPT

## 2022-02-21 PROCEDURE — 80053 COMPREHEN METABOLIC PANEL: CPT | Performed by: INTERNAL MEDICINE

## 2022-02-21 PROCEDURE — 84100 ASSAY OF PHOSPHORUS: CPT | Performed by: INTERNAL MEDICINE

## 2022-02-21 RX ORDER — LANOLIN/MINERAL OIL/PETROLATUM
OINTMENT (GRAM) OPHTHALMIC (EYE)
COMMUNITY
Start: 2022-02-15

## 2022-02-22 LAB
SARS-COV-2 AB SERPL IA-ACNC: >2500 U/ML
SARS-COV-2 AB SERPL-IMP: POSITIVE

## 2022-02-23 ENCOUNTER — TELEMEDICINE (OUTPATIENT)
Dept: PSYCHIATRY | Facility: CLINIC | Age: 62
End: 2022-02-23

## 2022-02-23 ENCOUNTER — SPECIALTY PHARMACY (OUTPATIENT)
Dept: PHARMACY | Facility: HOSPITAL | Age: 62
End: 2022-02-23

## 2022-02-23 DIAGNOSIS — F41.1 GENERALIZED ANXIETY DISORDER: Primary | ICD-10-CM

## 2022-02-23 DIAGNOSIS — F43.10 POST TRAUMATIC STRESS DISORDER (PTSD): ICD-10-CM

## 2022-02-23 PROCEDURE — 90834 PSYTX W PT 45 MINUTES: CPT | Performed by: COUNSELOR

## 2022-02-23 NOTE — PROGRESS NOTES
Date: February 24, 2022  Time In: 1001  Time Out: 1041  This provider is located at the [facility]), [address], [place] [address] using a secure [employer] Video Visit through [employer]. Patient is being seen remotely via telehealth at home address in [place] and stated they are in a secure environment for this session. The patient's condition being diagnosed/treated is appropriate for telemedicine. The provider identified herself as well as her credentials. The patient, and/or patients guardian, consent to be seen remotely, and when consent is given they understand that the consent allows for patient identifiable information to be sent to a third party as needed. They may refuse to be seen remotely at any time. The electronic data is encrypted and password protected, and the patient and/or guardian has been advised of the potential risks to privacy not withstanding such measures.     You have chosen to receive care through a telehealth visit.  Do you consent to use a video/audio connection for your medical care today? Yes    PROGRESS NOTE  Data:  Martha Davey is a 61 y.o. female who presents today for follow up    Chief Complaint: anxiety     History of Present Illness: Patient reports household stressors regarding spouse and children. Patient discussed behavioral changes regarding her spouse since starting chemo medication and how these changes have triggered childhood experiences. Patient goes on to discuss childhood experiences specifically related to her father and desires of wanting to inform her father of what a negative parent he was to her and siblings. Patient reports current concerns regarding her sons and feeling unheard within the home. Patient dicussed these stressors and their impact on her mental and emotional health discussing feeling overwhelmed and anxious.       Clinical Maneuvering/Intervention:    (Scales based on 0 - 10 with  10 being the worst)  Depression: 8 Anxiety: 8     FLACO-7  Feeling nervous, anxious or on edge: (P) Several days  Not being able to stop or control worrying: (P) More than half the days  Worrying too much about different things: (P) More than half the days  Trouble Relaxing: (P) More than half the days  Being so restless that it is hard to sit still: (P) Several days  Feeling afraid as if something awful might happen: (P) More than half the days  Becoming easily annoyed or irritable: (P) More than half the days  FLACO 7 Total Score: (P) 12  If you checked any problems, how difficult have these problems made it for you to do your work, take care of things at home, or get along with other people: (P) Somewhat difficult   PHQ-9 Total Score: (P) 7     Assisted patient in processing above session content; acknowledged and normalized patient’s thoughts, feelings, and concerns.  Rationalized patient thought process regarding feeling overwhelmed and stressed. Discussed childhood experiences. Discussed triggers and grounding methods. Encouraged Patient to complete a rage letter addressed to her father to be reviewed and processed for next session.     Allowed patient to freely discuss issues without interruption or judgment. Provided safe, confidential environment to facilitate the development of positive therapeutic relationship and encourage open, honest communication. Assisted patient in identifying risk factors which would indicate the need for higher level of care including thoughts to harm self or others and/or self-harming behavior and encouraged patient to contact this office, call 911, or present to the nearest emergency room should any of these events occur. Discussed crisis intervention services and means to access. Patient adamantly and convincingly denies current suicidal or homicidal ideation or perceptual disturbance.    Assessment:   Assessment   Patient appears to maintain relative stability as compared to their  baseline.  However, patient continues to struggle with anxiety which continues to cause impairment in important areas of functioning.  A result, they can be reasonably expected to continue to benefit from treatment and would likely be at increased risk for decompensation otherwise.    Mental Status Exam:   Hygiene:   good  Cooperation:  Cooperative  Eye Contact:  Good  Psychomotor Behavior:  Appropriate  Affect:  Appropriate  Mood: normal  Speech:  Normal  Thought Process:  Linear  Thought Content:  Mood congruent  Suicidal:  None  Homicidal:  None  Hallucinations:  None  Delusion:  None  Memory:  Intact  Orientation:  Person, Place, Time and Situation  Reliability:  fair  Insight:  Fair  Judgement:  Fair  Impulse Control:  Fair  Physical/Medical Issues:  No        Patient's Support Network Includes:      Functional Status: Mild impairment     Progress toward goal: Not at goal    Prognosis: Fair with Ongoing Treatment          Plan:    Patient will continue in individual outpatient therapy with focus on improved functioning and coping skills, maintaining stability, and avoiding decompensation and the need for higher level of care.    Patient will adhere to medication regimen as prescribed and report any side effects. Patient will contact this office, call 911 or present to the nearest emergency room should suicidal or homicidal ideations occur. Provide Cognitive Behavioral Therapy and Solution Focused Therapy to improve functioning, maintain stability, and avoid decompensation and the need for higher level of care.     Return in about 2 weeks, or earlier if symptoms worsen or fail to improve.           VISIT DIAGNOSIS:     ICD-10-CM ICD-9-CM   1. Generalized anxiety disorder  F41.1 300.02   2. Post traumatic stress disorder (PTSD)  F43.10 309.81          [facility] No Show Policy:  We understand unexpected circumstances arise; however, anytime you miss your appointment we are unable to  provide you appropriate care.  In addition, each appointment missed could have been used to provide care for others.  We ask that you call at least 24 hours in advance to cancel or reschedule an appointment.  We would like to take this opportunity to remind you of our policy stating patients who miss THREE or more appointments without cancelling or rescheduling 24 hours in advance of the appointment may be subject to cancellation of any further visits with our clinic and recommendation to seek in-person services/visits.    Please call [phone] to reschedule your appointment. If there are reasons that make it difficult for you to keep the appointments, please call and let us know how we can help.  Please understand that medication prescribing will not continue without seeing your provider.      [facility]'s No Show Policy reviewed with patient at today's visit. Patient verbalized understanding of this policy. Discussed with patient that in the event that there are three or more no show visits, it will be recommended that they pursue in-person services/visits as noncompliance with telehealth visits indicates that patient is not an appropriate candidate for telemedicine and would likely be more appropriate for in-person services/visits. Patient verbalizes understanding and is agreeable to this.        This document has been electronically signed by Sophia Barron LCSW  February 24, 2022 09:06 EST      Part of this note may be an electronic transcription/translation of spoken language to printed text using the Dragon Dictation System.

## 2022-02-23 NOTE — PROGRESS NOTES
Specialty Pharmacy Refill Coordination Note     Martha is a 61 y.o. female contacted today regarding refills of  xeloda specialty medication(s).    Reviewed and verified with patient:       Specialty medication(s) and dose(s) confirmed: yes    Refill Questions      Most Recent Value   Changes to allergies? No   Changes to medications? No   New conditions since last clinic visit Yes  [dry hands]   Unplanned office visit, urgent care, ED, or hospital admission in the last 4 weeks  No   How does patient/caregiver feel medication is working? Good   Financial problems or insurance changes  No   How many doses of your specialty medications were missed in the last 4 weeks? none   Does this patient require a clinical escalation to a pharmacist? No          Delivery Questions      Most Recent Value   Delivery method [employer]  [[employer] priority sig req- has dog- ship 2/24 deliver 2/25]   Delivery address correct? Yes   Preferred delivery time? AM   Number of medications in delivery 1   Medication being filled and delivered Xeloda   Doses left of specialty medications 1 week on- one week off   Questions or concerns for the pharmacist? No                 Follow-up: 3 week(s)     Ami Hudson  Specialty Pharmacy Technician

## 2022-03-02 ENCOUNTER — TELEMEDICINE (OUTPATIENT)
Dept: PSYCHIATRY | Facility: CLINIC | Age: 62
End: 2022-03-02

## 2022-03-02 DIAGNOSIS — F33.1 MAJOR DEPRESSIVE DISORDER, RECURRENT EPISODE, MODERATE: ICD-10-CM

## 2022-03-02 DIAGNOSIS — F41.1 GENERALIZED ANXIETY DISORDER: Primary | ICD-10-CM

## 2022-03-02 DIAGNOSIS — F43.10 POST TRAUMATIC STRESS DISORDER (PTSD): ICD-10-CM

## 2022-03-02 PROCEDURE — 90832 PSYTX W PT 30 MINUTES: CPT | Performed by: COUNSELOR

## 2022-03-02 NOTE — PROGRESS NOTES
Date: March 2, 2022  Time In: 1040  Time Out: 1105  This provider is located at the [facility]), [address], [place] [address] using a secure [employer] Video Visit through [employer]. Patient is being seen remotely via telehealth at home address in [place] and stated they are in a secure environment for this session. The patient's condition being diagnosed/treated is appropriate for telemedicine. The provider identified herself as well as her credentials. The patient, and/or patients guardian, consent to be seen remotely, and when consent is given they understand that the consent allows for patient identifiable information to be sent to a third party as needed. They may refuse to be seen remotely at any time. The electronic data is encrypted and password protected, and the patient and/or guardian has been advised of the potential risks to privacy not withstanding such measures.     You have chosen to receive care through a telehealth visit.  Do you consent to use a video/audio connection for your medical care today? Yes    PROGRESS NOTE  Data:  Martha Davey is a 61 y.o. female who presents today for follow up    Chief Complaint: Depression     History of Present Illness: Pt reports a few changes since previous session; her middle son has returned home and is residing there. Other son is having employment issues which has remained a stressor for the Pt. Pt discussed recent event of  belittling her due to action made when driving and how this triggered her causing her to remember her father's mannerisms. Patient reports having a conversation with spouse explaining this and reports that spouse seemed receptive.       Clinical Maneuvering/Intervention:    (Scales based on 0 - 10 with 10 being the worst)  Depression: 5 Anxiety: 5     FLACO-7  Feeling nervous, anxious or on edge: (P) Several days  Not being able to stop or control worrying: (P) More  than half the days  Worrying too much about different things: (P) More than half the days  Trouble Relaxing: (P) More than half the days  Being so restless that it is hard to sit still: (P) Several days  Feeling afraid as if something awful might happen: (P) More than half the days  Becoming easily annoyed or irritable: (P) More than half the days  FLACO 7 Total Score: (P) 12  If you checked any problems, how difficult have these problems made it for you to do your work, take care of things at home, or get along with other people: (P) Somewhat difficult   PHQ-9 Total Score: (P) 7     Assisted patient in processing above session content; acknowledged and normalized patient’s thoughts, feelings, and concerns.  Rationalized patient thought process regarding stressors and coping when triggered. Discussed self care and encouraged alone time creating a positive outlet for Patient. Praised Patient for direct conversation with spouse; Pt completed rage letter as previously discussed however due to video difficulty this will be processed next session.   Allowed patient to freely discuss issues without interruption or judgment. Provided safe, confidential environment to facilitate the development of positive therapeutic relationship and encourage open, honest communication. Assisted patient in identifying risk factors which would indicate the need for higher level of care including thoughts to harm self or others and/or self-harming behavior and encouraged patient to contact this office, call 911, or present to the nearest emergency room should any of these events occur. Discussed crisis intervention services and means to access. Patient adamantly and convincingly denies current suicidal or homicidal ideation or perceptual disturbance.    Assessment:   Assessment   Patient appears to maintain relative stability as compared to their baseline.  However, patient continues to struggle with anxiety, depression, and ptsd which  continues to cause impairment in important areas of functioning.  A result, they can be reasonably expected to continue to benefit from treatment and would likely be at increased risk for decompensation otherwise.    Mental Status Exam:   Hygiene:   unable to assess  Cooperation:  Cooperative  Eye Contact:  unable to assess  Psychomotor Behavior:  Appropriate  Affect:  unable to assess  Mood: anxious  Speech:  Normal  Thought Process:  Linear  Thought Content:  Mood congruent  Suicidal:  None  Homicidal:  None  Hallucinations:  None  Delusion:  None  Memory:  Intact  Orientation:  Person, Place, Time and Situation  Reliability:  fair  Insight:  Fair  Judgement:  Fair  Impulse Control:  Fair  Physical/Medical Issues:  No        Patient's Support Network Includes:  children    Functional Status: Mild impairment     Progress toward goal: Not at goal    Prognosis: Fair with Ongoing Treatment          Plan:    Patient will continue in individual outpatient therapy with focus on improved functioning and coping skills, maintaining stability, and avoiding decompensation and the need for higher level of care.    Patient will adhere to medication regimen as prescribed and report any side effects. Patient will contact this office, call 911 or present to the nearest emergency room should suicidal or homicidal ideations occur. Provide Cognitive Behavioral Therapy and Solution Focused Therapy to improve functioning, maintain stability, and avoid decompensation and the need for higher level of care.     Return in about 2 weeks, or earlier if symptoms worsen or fail to improve.           VISIT DIAGNOSIS:     ICD-10-CM ICD-9-CM   1. Generalized anxiety disorder  F41.1 300.02   2. Major depressive disorder, recurrent episode, moderate (HCC)  F33.1 296.32   3. Post traumatic stress disorder (PTSD)  F43.10 309.81          [facility] No Show Policy:  We understand unexpected circumstances arise; however, anytime  you miss your appointment we are unable to provide you appropriate care.  In addition, each appointment missed could have been used to provide care for others.  We ask that you call at least 24 hours in advance to cancel or reschedule an appointment.  We would like to take this opportunity to remind you of our policy stating patients who miss THREE or more appointments without cancelling or rescheduling 24 hours in advance of the appointment may be subject to cancellation of any further visits with our clinic and recommendation to seek in-person services/visits.    Please call [phone] to reschedule your appointment. If there are reasons that make it difficult for you to keep the appointments, please call and let us know how we can help.  Please understand that medication prescribing will not continue without seeing your provider.      [facility]'s No Show Policy reviewed with patient at today's visit. Patient verbalized understanding of this policy. Discussed with patient that in the event that there are three or more no show visits, it will be recommended that they pursue in-person services/visits as noncompliance with telehealth visits indicates that patient is not an appropriate candidate for telemedicine and would likely be more appropriate for in-person services/visits. Patient verbalizes understanding and is agreeable to this.        This document has been electronically signed by Sophia Barron LCSW  March 2, 2022 13:47 EST      Part of this note may be an electronic transcription/translation of spoken language to printed text using the Dragon Dictation System.

## 2022-03-07 ENCOUNTER — OFFICE VISIT (OUTPATIENT)
Dept: ONCOLOGY | Facility: CLINIC | Age: 62
End: 2022-03-07

## 2022-03-07 ENCOUNTER — LAB (OUTPATIENT)
Dept: OTHER | Facility: HOSPITAL | Age: 62
End: 2022-03-07

## 2022-03-07 ENCOUNTER — INFUSION (OUTPATIENT)
Dept: ONCOLOGY | Facility: HOSPITAL | Age: 62
End: 2022-03-07

## 2022-03-07 VITALS
DIASTOLIC BLOOD PRESSURE: 63 MMHG | TEMPERATURE: 97.1 F | HEIGHT: 61 IN | SYSTOLIC BLOOD PRESSURE: 138 MMHG | OXYGEN SATURATION: 96 % | RESPIRATION RATE: 18 BRPM | WEIGHT: 188.8 LBS | BODY MASS INDEX: 35.65 KG/M2 | HEART RATE: 101 BPM

## 2022-03-07 DIAGNOSIS — C79.51 BONE METASTASES: ICD-10-CM

## 2022-03-07 DIAGNOSIS — Z79.01 ANTICOAGULANT LONG-TERM USE: ICD-10-CM

## 2022-03-07 DIAGNOSIS — C50.811 MALIGNANT NEOPLASM OF OVERLAPPING SITES OF RIGHT BREAST IN FEMALE, ESTROGEN RECEPTOR NEGATIVE: ICD-10-CM

## 2022-03-07 DIAGNOSIS — G89.3 CANCER ASSOCIATED PAIN: ICD-10-CM

## 2022-03-07 DIAGNOSIS — Z17.1 MALIGNANT NEOPLASM OF OVERLAPPING SITES OF RIGHT BREAST IN FEMALE, ESTROGEN RECEPTOR NEGATIVE: Primary | ICD-10-CM

## 2022-03-07 DIAGNOSIS — Z17.1 MALIGNANT NEOPLASM OF OVERLAPPING SITES OF RIGHT BREAST IN FEMALE, ESTROGEN RECEPTOR NEGATIVE: ICD-10-CM

## 2022-03-07 DIAGNOSIS — Z79.899 HIGH RISK MEDICATION USE: ICD-10-CM

## 2022-03-07 DIAGNOSIS — C50.811 MALIGNANT NEOPLASM OF OVERLAPPING SITES OF RIGHT BREAST IN FEMALE, ESTROGEN RECEPTOR NEGATIVE: Primary | ICD-10-CM

## 2022-03-07 DIAGNOSIS — L27.1 HAND FOOT SYNDROME: ICD-10-CM

## 2022-03-07 DIAGNOSIS — L27.1 PALMAR PLANTAR ERYTHRODYSAESTHESIA DUE TO CYTOTOXIC THERAPY: ICD-10-CM

## 2022-03-07 LAB
ALBUMIN SERPL-MCNC: 3.8 G/DL (ref 3.5–5.2)
ALBUMIN/GLOB SERPL: 1.6 G/DL
ALP SERPL-CCNC: 72 U/L (ref 39–117)
ALT SERPL W P-5'-P-CCNC: 16 U/L (ref 1–33)
ANION GAP SERPL CALCULATED.3IONS-SCNC: 8.9 MMOL/L (ref 5–15)
AST SERPL-CCNC: 19 U/L (ref 1–32)
BASOPHILS # BLD AUTO: 0.02 10*3/MM3 (ref 0–0.2)
BASOPHILS NFR BLD AUTO: 0.5 % (ref 0–1.5)
BILIRUB SERPL-MCNC: 0.3 MG/DL (ref 0–1.2)
BUN SERPL-MCNC: 25 MG/DL (ref 8–23)
BUN/CREAT SERPL: 26.3 (ref 7–25)
CALCIUM SPEC-SCNC: 9.7 MG/DL (ref 8.6–10.5)
CHLORIDE SERPL-SCNC: 103 MMOL/L (ref 98–107)
CO2 SERPL-SCNC: 31.1 MMOL/L (ref 22–29)
CREAT SERPL-MCNC: 0.95 MG/DL (ref 0.57–1)
DEPRECATED RDW RBC AUTO: 68 FL (ref 37–54)
EGFRCR SERPLBLD CKD-EPI 2021: 68.3 ML/MIN/1.73
EOSINOPHIL # BLD AUTO: 0.16 10*3/MM3 (ref 0–0.4)
EOSINOPHIL NFR BLD AUTO: 3.7 % (ref 0.3–6.2)
ERYTHROCYTE [DISTWIDTH] IN BLOOD BY AUTOMATED COUNT: 18.7 % (ref 12.3–15.4)
GLOBULIN UR ELPH-MCNC: 2.4 GM/DL
GLUCOSE SERPL-MCNC: 127 MG/DL (ref 65–99)
HCT VFR BLD AUTO: 37.6 % (ref 34–46.6)
HGB BLD-MCNC: 12 G/DL (ref 12–15.9)
IMM GRANULOCYTES # BLD AUTO: 0.01 10*3/MM3 (ref 0–0.05)
IMM GRANULOCYTES NFR BLD AUTO: 0.2 % (ref 0–0.5)
LYMPHOCYTES # BLD AUTO: 0.89 10*3/MM3 (ref 0.7–3.1)
LYMPHOCYTES NFR BLD AUTO: 20.8 % (ref 19.6–45.3)
MAGNESIUM SERPL-MCNC: 2.4 MG/DL (ref 1.6–2.4)
MCH RBC QN AUTO: 32 PG (ref 26.6–33)
MCHC RBC AUTO-ENTMCNC: 31.9 G/DL (ref 31.5–35.7)
MCV RBC AUTO: 100.3 FL (ref 79–97)
MONOCYTES # BLD AUTO: 0.69 10*3/MM3 (ref 0.1–0.9)
MONOCYTES NFR BLD AUTO: 16.2 % (ref 5–12)
NEUTROPHILS NFR BLD AUTO: 2.5 10*3/MM3 (ref 1.7–7)
NEUTROPHILS NFR BLD AUTO: 58.6 % (ref 42.7–76)
NRBC BLD AUTO-RTO: 0 /100 WBC (ref 0–0.2)
PHOSPHATE SERPL-MCNC: 3.6 MG/DL (ref 2.5–4.5)
PLATELET # BLD AUTO: 253 10*3/MM3 (ref 140–450)
PMV BLD AUTO: 10.4 FL (ref 6–12)
POTASSIUM SERPL-SCNC: 4.7 MMOL/L (ref 3.5–5.2)
PROT SERPL-MCNC: 6.2 G/DL (ref 6–8.5)
RBC # BLD AUTO: 3.75 10*6/MM3 (ref 3.77–5.28)
SODIUM SERPL-SCNC: 143 MMOL/L (ref 136–145)
WBC NRBC COR # BLD: 4.27 10*3/MM3 (ref 3.4–10.8)

## 2022-03-07 PROCEDURE — 83735 ASSAY OF MAGNESIUM: CPT | Performed by: INTERNAL MEDICINE

## 2022-03-07 PROCEDURE — 36415 COLL VENOUS BLD VENIPUNCTURE: CPT

## 2022-03-07 PROCEDURE — 96372 THER/PROPH/DIAG INJ SC/IM: CPT

## 2022-03-07 PROCEDURE — 25010000002 DENOSUMAB 120 MG/1.7ML SOLUTION: Performed by: NURSE PRACTITIONER

## 2022-03-07 PROCEDURE — 84100 ASSAY OF PHOSPHORUS: CPT | Performed by: INTERNAL MEDICINE

## 2022-03-07 PROCEDURE — 85025 COMPLETE CBC W/AUTO DIFF WBC: CPT | Performed by: INTERNAL MEDICINE

## 2022-03-07 PROCEDURE — 80053 COMPREHEN METABOLIC PANEL: CPT | Performed by: INTERNAL MEDICINE

## 2022-03-07 PROCEDURE — 99214 OFFICE O/P EST MOD 30 MIN: CPT | Performed by: NURSE PRACTITIONER

## 2022-03-07 RX ADMIN — DENOSUMAB 120 MG: 120 INJECTION SUBCUTANEOUS at 09:45

## 2022-03-07 NOTE — PROGRESS NOTES
"She willChief Complaint  Previous Stage Ib ([id number]) ER/MD positive, HER-2/peter negative right breast cancer with subsequent metastatic disease identified 10/8/2017, history of right pulmonary embolism, cancer related pain, chemotherapy-induced diarrhea, chemotherapy-induced mucositis, chemotherapy-induced hand-foot syndrome    Subjective        History of Present Illness  The patient returns today for follow-up.  She continues on Xeloda 1000 mg in the morning, 1500 mg in the evening for 7 days on followed by 7 days off.  She started her on week today.  She also continues to receive monthly Xgeva.  She also continues on Eliquis 5 mg twice daily and is tolerating this well without signs or symptoms of bleeding.    She returns the office today in anticipation of her monthly Xgeva.  She is scheduled to resume her Xeloda today.  She does continue to struggle with fairly significant hand-foot syndrome.  She waxes and wanes with her emollient creams.  She reports she has not been utilizing the steroid the past 2 weeks.  Her skin is currently more dried though without fissures.  She reports this is not much different than her baseline.  She denies any new pain.  She does take occasional tramadol which is controlled her pain.  She denies diarrhea.  She is eating and drinking adequately.  She denies fevers or chills, signs or symptoms of infection    Objective   Vital Signs:   /63   Pulse 101   Temp 97.1 °F (36.2 °C) (Temporal)   Resp 18   Ht 156 cm (61.42\")   Wt 85.6 kg (188 lb 12.8 oz)   SpO2 96%   BMI 35.19 kg/m²       Physical Exam  Constitutional:       Appearance: She is well-developed.   Eyes:      Conjunctiva/sclera: Conjunctivae normal.   Neck:      Thyroid: No thyromegaly.   Cardiovascular:      Rate and Rhythm: Normal rate and regular rhythm.      Heart sounds: No murmur heard.    No friction rub. No gallop.   Pulmonary:      Effort: No respiratory distress.      Breath sounds: Normal breath " sounds.   Abdominal:      General: Bowel sounds are normal. There is no distension.      Palpations: Abdomen is soft.      Tenderness: There is no abdominal tenderness.   Lymphadenopathy:      Head:      Right side of head: No submandibular adenopathy.      Cervical: No cervical adenopathy.   Skin:     General: Skin is warm and dry.      Findings: Rash present.      Comments: Patient continues with palmar erythema with skin cracking and peeling. There are no fissures. The degree of sclerodactyly and general inflammation has remained stable with slightly reduced movement of her fingers.  The amount of skin peeling has improved.  This does extend onto the dorsal aspect of the hands to some extent, which has also improved.   Neurological:      Mental Status: She is alert and oriented to person, place, and time.      Cranial Nerves: No cranial nerve deficit.      Motor: No abnormal muscle tone.      Deep Tendon Reflexes: Reflexes normal.   Psychiatric:         Behavior: Behavior normal.     I have reexamined the patient and the results are consistent with the previously documented exam. Kylah Corrales, LISA         Result Review :   Results from last 7 days   Lab Units 03/07/22  0857   WBC 10*3/mm3 4.27   NEUTROS ABS 10*3/mm3 2.50   HEMOGLOBIN g/dL 12.0   HEMATOCRIT % 37.6   PLATELETS 10*3/mm3 253     Results from last 7 days   Lab Units 03/07/22  0857   SODIUM mmol/L 143   POTASSIUM mmol/L 4.7   CHLORIDE mmol/L 103   CO2 mmol/L 31.1*   BUN mg/dL 25*   CREATININE mg/dL 0.95   CALCIUM mg/dL 9.7   ALBUMIN g/dL 3.80   BILIRUBIN mg/dL 0.3   ALK PHOS U/L 72   ALT (SGPT) U/L 16   AST (SGOT) U/L 19   GLUCOSE mg/dL 127*   MAGNESIUM mg/dL 2.4              Assessment and Plan     1. Previous Stage Ib ([id number]) right breast cancer:  · Diagnosed May 2010 with excisional biopsy for invasive ductal carcinoma, 1.3 cm, grade 2, ER 90%, ID 80%, HER-2/peter negative (1+ IHC).    · Subsequent right mastectomy in July 2010 with  no residual breast malignancy, 1/5 sentinel lymph node with micrometastasis (0.25 mm).    · Treated in the [facility] system with adjuvant AC ×4 cycles in 2010 (no taxanes administered due to underlying Charcot-Saloni-Tooth with peripheral neuropathy).    · Adjuvant Femara (postmenopausal) initiated October 2010 with plan to treat ×10 years.    · Genetic testing reportedly negative.    · Developed osteopenia treated with Prolia beginning 2/27/13. Subsequently discontinued due to identification of metastatic disease.  2. Recurrent/metastatic disease identified 10/8/17:  · Disease involving thoracic spine with cord compression at T6, lumbosacral involvement, sternal and right sternoclavicular involvement.    · Femara discontinued in 10/2017.    · Radiation administered (in the [facility] system) to the thoracic spine beginning 10/19/17 treating T3-T9 to a dose of 24 gray in 6 fractions.  · Evaluation with MRI 12/8/17 showing persistent T6 cord compression with persistent neurologic compromise requiring surgical treatment 12/11/17 with T6 laminectomy/corpectomy and T3-T9 fusion.  Pathology with metastatic carcinoma of breast origin, ER negative, TX negative, HER-2/peter negative (1+ IHC).  · Additional staging evaluation 12/8/17 with no evidence of visceral metastatic disease, bone scan showing involvement of thoracic spine, sternum, left humerus, mid frontal bone.  No plane film correlate of left humerus lesion.  MRI lumbar spine with small intradural L3 metastasis.  CA 15-3 12/6/17- 17.  · Palliative radiation therapy to L3 dural metastasis and left humerus initiated 1/15/18 (10 fractions), completed 1/26/18.  · Hypercalcemia of malignancy with calcium in the 10-11 range.  · Initiation of monthly Xgeva 1/23/18.  · Baseline CT scan 1/30/18 with no evidence of visceral involvement.  Cluster of nodular opacities in the right lower lobe suspected to be infectious or related to bronchiolitis. Bone scan 1/30/18 showed postsurgical  change in the thoracic spine, stable uptake in the frontal bone, no new areas of disease.  · Initiation of palliative oral single agent Xeloda 2/7/18 2000 mg a.m., 1500 mg p.m. for 14/21 days.   · Following 3 cycles xeloda, bone scan 4/4/18 showed no change from the prior study.  CT scan 4/4/18 showed a small pericardial effusion of unclear significance as well as a subcutaneous nodule in the right lateral chest wall.  Subsequent evaluation with echocardiogram 4/17/18 showed no evidence of pericardial effusion.  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  We discussed the possibility of surgical excision of the right subcutaneous chest wall lesion for more definitive diagnosis.  Reviewed previous CT images dating back to 12/8/17 and the lesion was present even at that time measuring around 1.7 cm although not commented on in the radiology report.  As this appears to be an indolent low-grade process unrelated to her breast cancer, recommendee foregoing surgical excision at this time and monitoring this area on future scans.  The patient agreed.    · Following 6 cycles of Xeloda, CT 6/6/18  showed stable findings, no evidence of progressive disease.  There was a comment regarding subcutaneous abnormality in the anterior abdominal wall and this was related to Lovenox injection sites.  Bone scan 6/6/18 showed no interval change.   · CT scan and bone scan 8/13/18 following 9 cycles of Xeloda showed stable findings with no evidence of significant visceral metastases.  Her bone lesions appear stable on bone scan.  The spindle cell neoplasm in her right chest wall actually decreased in size from 2 cm down to 1.6 cm.    · The patient experienced some symptoms of diarrhea, anorexia, generalized weakness during cycle 9 Xeloda it was unclear whether this was related to a viral  gastroenteritis or toxicity from treatment.  Symptoms recurred during cycle 10 and treatment was cut short by 2 days.  Symptoms attributed to Xeloda.  With cycle 11, dose and schedule altered to 1500 mg twice daily for 7 days on followed by 7 days off .  · CT scan 9/9/2020 with no significant changes.  Bone scan 9/9/2020 read as unchanged from prior studies however did note an area of slight activity in the medial left femur.  Contacted radiology and although this was not noted on prior reports appears to have been present.  Subsequent MRI left femur 9/21/2020 with cortical thickening and periosteal edema left iliac us muscle insertion to the medial left femur with no evidence of metastatic disease, favored to represent periosteal change secondary to insertional tendinitis.  · Severe hand-foot symptoms causing sclerodactyly and limitation in finger movement prompted change in dosing in July 2021 with Xeloda decreased to 1000 mg a.m., 1500 mg p.m. for 7 days on followed by 7 days off.  · Most recent 3-month interval scans from 1/31/2022 with CT chest abdomen pelvis, bone scan performed.  CT scans showed no significant change from prior studies.  Bone scan however showed a new increased focus of activity right inferior ischio pubic ramus corresponding to area of subtle but increasing sclerosis on CT.  Unclear whether this is a new metastatic lesion or treatment response around previously occult lesion.  Decision to pursue 2-month interval scans.  · The patient has the above scans to review with CT chest abdomen pelvis and bone scan from 1/31/2022 at a 3-month interval. Her scans with overall fairly stable findings.  The only concern is on the bone scan which showed a new area of activity in the right inferior ischio pubic ramus.  This is a small area of activity that corresponds to an area of increasing sclerosis on CT.  Unclear if this is a lesion that is treated or whether this could be a new lesion developing.  She  is asymptomatic.  We discussed pursuit of repeat CT and bone scan at a 2-month interval to see if there has been any further change in this area or in any other areas.  If this remains a focal area suspicious for solitary progression we would consider potentially radiating the site and continuing Xeloda if the scans are otherwise stable.  For now we will continue current treatment.    · Returning today, 3/7/2022, hand-foot syndrome is overall stable with stability of her sclerodactyly also.  She will proceed with Xgeva.  She will remain on Xeloda 1000 g in the morning, 1500 mg in the evening 7 days on, 7 days off.  In 2 weeks, the patient will undergo repeat CT scans and a bone scan with MD follow-up to review.  She will be due again for Xgeva in 4 weeks  3. Right pulmonary embolism:  · Diagnosed on CT angiogram 10/21/17 involving small right lower lobe pulmonary artery.  Lower extremity Dopplers negative.  · Bilateral lower extremity Dopplers negative again 12/5/17.  · Received chronic Lovenox 1 mg/kg twice per day, transition to oral Eliquis in February 2019, continuing on Eliquis 5 mg twice daily.  · The patient has had no bleeding difficulties on anticoagulation  4. Cancer related pain:  · Previously receiving Duragesic 50 µg patch every 72 hours along with Dilaudid 4 mg as needed for breakthrough pain  · The patient's pain improved over time and she was able to discontinue both Duragesic and Dilaudid in the interval.  · Patient does take occasional Flexeril at bedtime due to back spasm/pain when she has been more active.  · The patient does have some occasional aggravation of her chronic back pain and does use tramadol 50 mg every 8 hours as needed  · Patient's pain is stable.  She does continue to use tramadol 50 mg every 8-12 hours as needed which has been effective.  The patient does have chronic difficulty with pain involving her low back, left shoulder.  Physical therapy has helped to produce some relief.    · She reports today, 3/7/2022 her pain is controlled with tramadol as needed.  She is not in need of a refill  5. Chemotherapy-induced diarrhea with subsequent C. difficile colitis in the setting of previous ulcerative colitis:  · Patient with reported history of ulcerative colitis, is not on active therapy.  · The patient developed initial diarrhea related to Xeloda at regular dosing.  · Symptoms improved on reduced dose Xeloda  · Flare of symptoms in October 2018 with apparent finding of C. difficile colitis by GI, treated with course of oral vancomycin with improvement in symptoms.  · Patient notes minimal intermittent diarrhea/loose stools on Xeloda requiring occasional dosing of Imodium.  Bowel function recently has been stable, has required infrequent dosing of Imodium recently.  Bowels are unchanged today  6. Traumatic left tibia/fibular fracture:  · Status post ORIF 12/6/17  · Specimen was sent for pathologic review, negative for evidence of malignancy  7. Hypercalcemia:  · Suspect hypercalcemia of malignancy, calcium in  10-11 range previously.  · Calcium normalized following initiation of monthly Xgeva on 1/23/18.  · Calcium today is 9.7  8. Chemotherapy-induced mucositis:  · Patient had a minimal degree of mucositis with cycle 2.  The patient has magic mouthwash to use as needed.  No subsequent mucositis.  9. Recurrent UTI, bladder wall thickening on CT:  · Patient had an enterococcal UTI on 3/2/18 sensitive to nitrofurantoin and received treatment, unclear how long.  · Recurrent UTI 3/20/18 with urine culture growing Klebsiella, initially treated with nitrofurantoin, transitioned to Levaquin.  · CT 4/4/18 with diffuse bladder wall thickening with increased nodular thickening at the left base.  Referral to urogynecology Dr. May Johnson.  She was placed on a prophylactic dose of trimethoprim 100 mg daily, bladder wall thickening felt to be related to recent recurrent urinary tract infections.  · Patient  with urinary symptoms, treated with course of Macrobid at the end of December 2018, urine culture however was negative 12/31/18.  · Patient was found to have Klebsiella UTI 7/29/2019 which was successfully treated with Macrobid with complete resolution of symptoms.  · CT 8/10/2021 with diffuse bladder wall thickening new from 5/17/2021 (however seen on multiple prior scans).    · UTI on 10/18/2021 with E. coli greater than 100,000 colonies that was pansensitive, treated with Macrobid x7 days  · With ongoing/recurrent UTIs patient was seen by urogynecology and initiated suppressive therapy with trimethoprim 100 mg daily in December 2021.  She has not experienced any further urinary tract infections.  · 2/7/2022, patient notes that for unclear reason she stopped taking the trimethoprim.  Encouraged her to resume this as prescribed.  · 2/21/2022, patient has resumed trimethoprim per urogynecology.   10.   Mobility:  · The patient underwent an intensive course of rehabilitation at [facility].  She graduated from her outpatient course November 2018.  · Overall the patient has improved dramatically in terms of mobility, now walking around 1 mile per day without assistance.  11.  Depression:  · The patient is continuing follow-up in the supportive oncology clinic and is currently continuing on Cymbalta 30 mg twice daily as well as gabapentin 600 mg nightly.  · The patient feels that her depression symptoms are currently fairly well controlled.  She has seen some benefit from attending support groups at [employer] which she plans to continue.  · The patient does continue follow-up in supportive oncology clinic, last seen on 12/3/2021.  Symptoms are fairly stable  12. 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  Symptoms did improve and she  continued on the same dose.    · Patient was referred to dermatology and has been continuing on triamcinolone 0.1% cream used for 1 week on followed by 1 week off which led to some further improvement.   · Progressive palmar erythema with development of sclerodactyly and effect on dexterity.  Addition of urea-based cream 3 times daily.  · Patient with continued difficulty regarding erythema of her hands that extended onto the dorsal aspect and was causing contractures/sclerodactyly in her fingers affecting her dexterity.  In July 2021, held Xeloda for an additional week and then reduced dose from 1500 mg twice daily down to 1000 mg a.m. and 1500 mg p.m. and continued on a 7-day on followed by 7-day off schedule.  · With reduction in Xeloda dose, slight improvement in erythema involving dorsal aspect of the hands and slight decrease in sclerodactyly  · Patient symptoms have worsened recently with increased skin peeling in the hands, particularly around the fingertips and extending onto the dorsum of the hand as well.  She has also experienced increase in skin thickening and sclerodactyly involving her hands with decrease in dexterity.  She is only using her emollient cream twice per day and is not using the triamcinolone cream prescribed by dermatology nor is she using the urea-based cream. Asked her to increase frequency of the emollient cream to 3-4 times per day at a minimum and to resume using the triamcinolone cream twice per day.    · 3/7/2022, symptoms are overall stable.  She has not been utilizing emollient cream 3-4 times daily.  She understands she can increase the frequency of emollient cream and continue triamcinolone as needed  13. Evidence of steatosis on scans with mild intermittent elevated liver function studies:  · Liver function studies increased 8/20/19 with ALT 98, AST 70, normal total bilirubin.  · Negative viral hepatitis A, B, and C panel 8/23/18  · Likely related to hepatic steatosis.    · Subsequent improvement in LFTs  · Today, LFTs remain normal   14. Chemotherapy induced leukopenia:  · WBC today  4.27.  15. GERD:  · Patient with significant history of reflux  · Patient currently receiving Prilosec 20 mg twice daily and Carafate 1 g 4 times daily  · Patient did follow-up with Dr. Ocasio and is scheduled for EGD and colonoscopy which has been postponed until March 16. Insomnia:  · Patient with prior paradoxical reaction to Benadryl  · Improved previously on temazepam 15 mg nightly as needed.   · Patient noted subsequently that temazepam was having no effect.   · Symptoms currently stable on gabapentin 600 mg nightly  · Patient has been continuing on temazepam 15 mg nightly, does report some effectiveness.    17. Health maintenance:  · Patient notes that she has a history of colon polyps as well as ulcerative colitis and was due for a follow-up colonoscopy on 9/12/2019.  We did discuss there is no necessity to pursue colonoscopy in the setting of her metastatic breast cancer.    · The patient did undergo colonoscopy on 2/7/2020 with findings of muscular hypertrophy and diverticulosis.   18. Right shoulder pain:  · Patient was evaluated by Dr Forrester.  She has experienced difficulty over a long time frame with right shoulder although she has not complained of this in prior visits to our office.  She reported difficulty with abduction.    · The patient did undergo MRI of her right shoulder on 11/21/2019 at an outside facility showing multiple abnormalities including supraspinatus tendinosis, labral tear but no evidence of metastatic disease.  · Patient developed pain in the left shoulder, was seen by orthopedics and underwent steroid injection with some improvement.  Right shoulder is stable currently.  Patient did undergo physical therapy with some improvement.  She continues to use tramadol 50 mg every 8-12 hours as needed.  19. Ocular changes in part related to Xeloda:  · Patient experienced a  mild degree of blurred vision as well as burning and pruritus.  · She was seen by her ophthalmologist and was placed on xiidra ophthalmic drops which have helped.  · Likely both issues are to some extent related to Xeloda.  · Symptoms did worsen and patient was seen by a new ophthalmologist at [facility].  She is now using an ocular lubricant at night in addition to artificial tears which have helped.  20. Elevated glucose:  · It is noted the patient's glucose at the last few visits has been in the high 100 range, postprandial.  · She has had a hemoglobin A1c that has been in the high 5-6 range in the past, on 5/1/2019 at 5.7  · Hemoglobin A1c on 5/7/2021 was improved at 5.2  21. Possible loosening of pedicle screw at T3:  · CT cervical spine 5/17/2021 showed loosening of the left pedicle screw at T3.  Patient referred to orthopedics and was seen by Dr. Nayak who felt that there were no concerning findings on the scan  22. COVID-19 vaccination  · Patient received the Pfizer vaccine, second dose administered on 4/2/2021 without side effects.  · Patient received her third dose Pfizer COVID-19 injection in August 2021  · 2/21/2022 Per patient request, we will draw COVID-19 antibodies which were greater than 2500.     Plan:   1. Continue Xeloda 1000 mg a.m., 1500 mg in the p.m. 7 days on followed by 7 days off.  Due to restart Xeloda today, 3/7/2022  2. Proceed today with monthly Xgeva  3. Continue Eliquis 5 mg twice daily.  4. Continue emollient cream at least 3-4 times daily.  Patient also has triamcinolone cream (provided by dermatology) twice daily, but my only take this 2 weeks at a time..  She understands she can increase the frequency of emollient cream currently  5. Continue omeprazole 20 mg twice daily  6. Continue Carafate 1 g 4 times daily   7. Continue ocular lubricating gel nightly per ophthalmology  8. Continue Provigil 100 mg daily and Cymbalta 30 mg twice daily per supportive oncology  clinic.  Patient continues routine follow-up with supportive oncology.  9. Continue temazepam 15 mg nightly.  10. Continue trimethoprim 100 mg daily that was prescribed by urogynecology for ongoing suppressive therapy for UTIs  11. Patient notes that EGD/colonoscopy has been postponed until March.  12. In 3 weeks CT chest abdomen pelvis, and bone scan  13. Later that week following scans MD visit with no labs and scan review  14. In 4 weeks CBC, CMP, magnesium, phosphorus and Xgeva due    Patient continuing on high risk medication requiring intensive monitoring.           Kylah Corrales, APRN  03/07/2022

## 2022-03-07 NOTE — NURSING NOTE
Pt arrived for xgeva injection with no complaints or concerns voiced at this time.  Injection administered in left arm without incidence. F/u appt reviewed with pt and instructed to call the office for any concerns prior to next appt. Pt vu and discharged stable.    Lab Results   Component Value Date    GLUCOSE 127 (H) 03/07/2022    BUN 25 (H) 03/07/2022    CREATININE 0.95 03/07/2022    EGFRIFNONA 56 (L) 02/21/2022    EGFRIFAFRI 85 11/12/2021    BCR 26.3 (H) 03/07/2022    K 4.7 03/07/2022    CO2 31.1 (H) 03/07/2022    CALCIUM 9.7 03/07/2022    PROTENTOTREF 6.1 11/12/2021    ALBUMIN 3.80 03/07/2022    LABIL2 2.1 11/12/2021    AST 19 03/07/2022    ALT 16 03/07/2022

## 2022-03-08 ENCOUNTER — OFFICE VISIT (OUTPATIENT)
Dept: PSYCHIATRY | Facility: HOSPITAL | Age: 62
End: 2022-03-08

## 2022-03-08 DIAGNOSIS — F33.1 MAJOR DEPRESSIVE DISORDER, RECURRENT EPISODE, MODERATE: ICD-10-CM

## 2022-03-08 DIAGNOSIS — F43.10 POST TRAUMATIC STRESS DISORDER (PTSD): ICD-10-CM

## 2022-03-08 DIAGNOSIS — R53.0 NEOPLASTIC MALIGNANT RELATED FATIGUE: ICD-10-CM

## 2022-03-08 DIAGNOSIS — F41.1 GENERALIZED ANXIETY DISORDER: Primary | ICD-10-CM

## 2022-03-08 DIAGNOSIS — G47.33 OSA (OBSTRUCTIVE SLEEP APNEA): ICD-10-CM

## 2022-03-08 PROCEDURE — 99214 OFFICE O/P EST MOD 30 MIN: CPT | Performed by: NURSE PRACTITIONER

## 2022-03-08 RX ORDER — DULOXETIN HYDROCHLORIDE 30 MG/1
30 CAPSULE, DELAYED RELEASE ORAL 2 TIMES DAILY
Qty: 180 CAPSULE | Refills: 0 | Status: SHIPPED | OUTPATIENT
Start: 2022-03-08 | End: 2022-05-10

## 2022-03-08 RX ORDER — MODAFINIL 200 MG/1
200 TABLET ORAL DAILY
Qty: 30 TABLET | Refills: 2 | Status: SHIPPED | OUTPATIENT
Start: 2022-03-08 | End: 2022-06-06

## 2022-03-08 NOTE — PROGRESS NOTES
[facility]  In Person Session    Subjective  Patient ID: Martha Davey is a 61 y.o. female is seen face to face in the [facility].    CC: Stable sx, continued complicated family dynamics    HPI:   Pt continues to report stability of mood and anxiety sx on current regimen of cymbalta, modafinil, despite continued impact of stress, complicated family dynamics. Identifies respiratory anxiety surrounding new potentially metastatic lesion, current uncertainty.  Fearful of having to transition to alternate treatment that won't be as tolerable. Continues to identify increased impact of caregiver distress alongside 's cancer dx, son with special needs moving back in. Reports ability to do this, as long as able to have time for her selfand working to incorporate regularly. Has engaged in therapy on weekly basis which patient finds has been helpful. Sleep remains stable at most times with occasional fragmentation, specifically alongside fear of missing an apt or obligation.  Generally feels symptoms are appropriately managed.    Objective   Mental Status Exam  Appearance:  clean and casually dressed, appropriate  Attitude toward clinician:  cooperative and agreeable   Speech:    Rate:  regular rate and rhythm   Volume:  normal  Motor:  no abnormal movements present  Mood: Stable  Affect:  euthymic  Thought Processes:  linear, logical, and goal directed  Thought Content:  normal  Suicidal Thoughts:  absent  Homicidal Thoughts:  absent  Perceptual Disturbance: no perceptual disturbance  Attention and Concentration:  limited  Insight and Judgement:  limited  Memory:  memory appears to be intact    Review of Systems   Psychiatric/Behavioral: Positive for dysphoric mood. The patient is nervous/anxious.      Medications Reviewed:  Cymbalta 30 mg bid  Gabapentin 600 mg q hs  Modafinil 100 daily  Temazepam 15 mg q hs per outside provider    Diagnoses and all orders for this  visit:    1. Generalized anxiety disorder (Primary)    2. Major depressive disorder, recurrent episode, moderate (HCC)    3. Post traumatic stress disorder (PTSD)    4. Neoplastic malignant related fatigue  -     modafinil (Provigil) 200 MG tablet; Take 1 tablet by mouth Daily.  Dispense: 30 tablet; Refill: 2    5. MARIA M (obstructive sleep apnea)    Other orders  -     DULoxetine (CYMBALTA) 30 MG capsule; Take 1 capsule by mouth 2 (Two) Times a Day.  Dispense: 180 capsule; Refill: 0    Plan of Care  Stable symptom management; continue cymbalta 30 mg bid, modafinil 100 mg daily. Gabapentin and Restoril per outside providers.  Continue therapy.  Reviewed strategies for regular self-care, behavioral activation, incorporation of enjoyable activities.  Supported patient in current grief response, anxiety surrounding scans.  Follow-up arranged in 3 months, although patient aware sooner follow-up available if needed.    I spent 36 minutes caring for Martha on this date of service. This time includes time spent by me in the following activities: preparing for the visit, obtaining and/or reviewing a separately obtained history, performing a medically appropriate examination and/or evaluation, counseling and educating the patient/family/caregiver, ordering medications, tests, or procedures and documenting information in the medical record.

## 2022-03-15 ENCOUNTER — SPECIALTY PHARMACY (OUTPATIENT)
Dept: PHARMACY | Facility: HOSPITAL | Age: 62
End: 2022-03-15

## 2022-03-15 NOTE — PROGRESS NOTES
Specialty Pharmacy Refill Coordination Note     Martha is a 61 y.o. female contacted today regarding refills of  Xeloda specialty medication(s).    Reviewed and verified with patient:         Specialty medication(s) and dose(s) confirmed: yes    Refill Questions    Flowsheet Row Most Recent Value   Changes to allergies? No   Changes to medications? No   New conditions since last clinic visit No   Unplanned office visit, urgent care, ED, or hospital admission in the last 4 weeks  No   How does patient/caregiver feel medication is working? Good   Financial problems or insurance changes  No   How many doses of your specialty medications were missed in the last 4 weeks? none   Does this patient require a clinical escalation to a pharmacist? No          Delivery Questions    Flowsheet Row Most Recent Value   Delivery method [employer]  [[employer] priority sig required- ring doorbell- ship 3/24- deliver 3/25]   Delivery address correct? Yes   Preferred delivery time? AM   Number of medications in delivery 1   Medication being filled and delivered Xeloda   Doses left of specialty medications 1 week on- 1 week off   Questions or concerns for the pharmacist? No                 Follow-up: 3 week(s)     Ami Hudson  Specialty Pharmacy Technician

## 2022-03-16 ENCOUNTER — TELEMEDICINE (OUTPATIENT)
Dept: PSYCHIATRY | Facility: CLINIC | Age: 62
End: 2022-03-16

## 2022-03-16 DIAGNOSIS — F33.1 MAJOR DEPRESSIVE DISORDER, RECURRENT EPISODE, MODERATE: ICD-10-CM

## 2022-03-16 DIAGNOSIS — F41.1 GENERALIZED ANXIETY DISORDER: Primary | ICD-10-CM

## 2022-03-16 PROCEDURE — 90834 PSYTX W PT 45 MINUTES: CPT | Performed by: COUNSELOR

## 2022-03-16 NOTE — PROGRESS NOTES
"Date: March 23, 2022  Time In: 1032  Time Out: 1114  This provider is located at the [facility]), [address], [place] [address] using a secure [employer] Video Visit through [employer]. Patient is being seen remotely via telehealth at home address in [place] and stated they are in a secure environment for this session. The patient's condition being diagnosed/treated is appropriate for telemedicine. The provider identified herself as well as her credentials. The patient, and/or patients guardian, consent to be seen remotely, and when consent is given they understand that the consent allows for patient identifiable information to be sent to a third party as needed. They may refuse to be seen remotely at any time. The electronic data is encrypted and password protected, and the patient and/or guardian has been advised of the potential risks to privacy not withstanding such measures.     You have chosen to receive care through a telehealth visit.  Do you consent to use a video/audio connection for your medical care today? Yes    PROGRESS NOTE  Data:  Martha Davey is a 61 y.o. female who presents today for follow up    Chief Complaint: Depression     History of Present Illness: Patient reports that she has been walking more and that she has a new perspective that has helped redirect negative thoughts and emotions. Patient reports that she will tell herself \"not going to let it get to me\" when feeling frustrated or negative. Patient discussed changes within her sons' status regarding housing and work. Patient reports changed values of her  and how they used to be on the same page but has noticed a change in his values that has caused some strain within their marriage. Patient reports the relationship history among her and her siblings and how she plans to change to a more closed boundary whenever her mother passes away.     Clinical " Maneuvering/Intervention:    (Scales based on 0 - 10 with 10 being the worst)  Depression: 0 Anxiety: 3     FLACO-7  Feeling nervous, anxious or on edge: (P) Several days  Not being able to stop or control worrying: (P) Several days  Worrying too much about different things: (P) Several days  Trouble Relaxing: (P) Several days  Being so restless that it is hard to sit still: (P) Not at all  Feeling afraid as if something awful might happen: (P) Not at all  Becoming easily annoyed or irritable: (P) Several days  FLACO 7 Total Score: (P) 5  If you checked any problems, how difficult have these problems made it for you to do your work, take care of things at home, or get along with other people: (P) Somewhat difficult   PHQ-9 Total Score: (P) 7     Assisted patient in processing above session content; acknowledged and normalized patient’s thoughts, feelings, and concerns.  Rationalized patient thought process regarding changing perspective and taking time for self. Praised Patient for positive outlook and being selective with her energy and responses. Discussed boundaries and permitting Patient to chose the accessibility she gives to others.     Allowed patient to freely discuss issues without interruption or judgment. Provided safe, confidential environment to facilitate the development of positive therapeutic relationship and encourage open, honest communication. Assisted patient in identifying risk factors which would indicate the need for higher level of care including thoughts to harm self or others and/or self-harming behavior and encouraged patient to contact this office, call 911, or present to the nearest emergency room should any of these events occur. Discussed crisis intervention services and means to access. Patient adamantly and convincingly denies current suicidal or homicidal ideation or perceptual disturbance.    Assessment:   Assessment   Patient appears to maintain relative stability as compared to their  baseline.  However, patient continues to struggle with anxiety and depression which continues to cause impairment in important areas of functioning.  A result, they can be reasonably expected to continue to benefit from treatment and would likely be at increased risk for decompensation otherwise.    Mental Status Exam:   Hygiene:   good  Cooperation:  Cooperative  Eye Contact:  Good  Psychomotor Behavior:  Appropriate  Affect:  Full range  Mood: normal  Speech:  Normal  Thought Process:  Linear  Thought Content:  Normal  Suicidal:  None  Homicidal:  None  Hallucinations:  None  Delusion:  None  Memory:  Intact  Orientation:  Person, Place, Time and Situation  Reliability:  fair  Insight:  Fair  Judgement:  Fair  Impulse Control:  Fair  Physical/Medical Issues:  No      Patient's Support Network Includes:  mother    Functional Status: Mild impairment     Progress toward goal: Not at goal    Prognosis: Fair with Ongoing Treatment     Plan:    Patient will continue in individual outpatient therapy with focus on improved functioning and coping skills, maintaining stability, and avoiding decompensation and the need for higher level of care.    Patient will adhere to medication regimen as prescribed and report any side effects. Patient will contact this office, call 911 or present to the nearest emergency room should suicidal or homicidal ideations occur. Provide Cognitive Behavioral Therapy and Solution Focused Therapy to improve functioning, maintain stability, and avoid decompensation and the need for higher level of care.     Return in about 2 weeks, or earlier if symptoms worsen or fail to improve.    VISIT DIAGNOSIS:     ICD-10-CM ICD-9-CM   1. Generalized anxiety disorder  F41.1 300.02   2. Major depressive disorder, recurrent episode, moderate (HCC)  F33.1 296.32        [facility] No Show Policy:  We understand unexpected circumstances arise; however, anytime you miss your appointment we  are unable to provide you appropriate care.  In addition, each appointment missed could have been used to provide care for others.  We ask that you call at least 24 hours in advance to cancel or reschedule an appointment.  We would like to take this opportunity to remind you of our policy stating patients who miss THREE or more appointments without cancelling or rescheduling 24 hours in advance of the appointment may be subject to cancellation of any further visits with our clinic and recommendation to seek in-person services/visits.    Please call [phone] to reschedule your appointment. If there are reasons that make it difficult for you to keep the appointments, please call and let us know how we can help.  Please understand that medication prescribing will not continue without seeing your provider.      [facility]'s No Show Policy reviewed with patient at today's visit. Patient verbalized understanding of this policy. Discussed with patient that in the event that there are three or more no show visits, it will be recommended that they pursue in-person services/visits as noncompliance with telehealth visits indicates that patient is not an appropriate candidate for telemedicine and would likely be more appropriate for in-person services/visits. Patient verbalizes understanding and is agreeable to this.        This document has been electronically signed by Sophia Barron LCSW  March 23, 2022 10:54 EDT      Part of this note may be an electronic transcription/translation of spoken language to printed text using the Dragon Dictation System.

## 2022-03-28 ENCOUNTER — HOSPITAL ENCOUNTER (OUTPATIENT)
Dept: NUCLEAR MEDICINE | Facility: HOSPITAL | Age: 62
Discharge: HOME | End: 2022-03-28

## 2022-03-28 ENCOUNTER — HOSPITAL ENCOUNTER (OUTPATIENT)
Dept: CT IMAGING | Facility: HOSPITAL | Age: 62
Discharge: HOME | End: 2022-03-28
Admitting: INTERNAL MEDICINE

## 2022-03-28 DIAGNOSIS — Z17.1 MALIGNANT NEOPLASM OF OVERLAPPING SITES OF RIGHT BREAST IN FEMALE, ESTROGEN RECEPTOR NEGATIVE: ICD-10-CM

## 2022-03-28 DIAGNOSIS — C50.811 MALIGNANT NEOPLASM OF OVERLAPPING SITES OF RIGHT BREAST IN FEMALE, ESTROGEN RECEPTOR NEGATIVE: ICD-10-CM

## 2022-03-28 PROCEDURE — 82565 ASSAY OF CREATININE: CPT

## 2022-03-28 PROCEDURE — 78306 BONE IMAGING WHOLE BODY: CPT

## 2022-03-28 PROCEDURE — A9503 TC99M MEDRONATE: HCPCS | Performed by: INTERNAL MEDICINE

## 2022-03-28 PROCEDURE — 25010000002 IOPAMIDOL 61 % SOLUTION: Performed by: INTERNAL MEDICINE

## 2022-03-28 PROCEDURE — 0 TECHNETIUM MEDRONATE KIT: Performed by: INTERNAL MEDICINE

## 2022-03-28 PROCEDURE — 0 DIATRIZOATE MEGLUMINE & SODIUM PER 1 ML: Performed by: INTERNAL MEDICINE

## 2022-03-28 PROCEDURE — 71260 CT THORAX DX C+: CPT

## 2022-03-28 PROCEDURE — 74177 CT ABD & PELVIS W/CONTRAST: CPT

## 2022-03-28 RX ORDER — TC 99M MEDRONATE 20 MG/10ML
20.8 INJECTION, POWDER, LYOPHILIZED, FOR SOLUTION INTRAVENOUS
Status: COMPLETED | OUTPATIENT
Start: 2022-03-28 | End: 2022-03-28

## 2022-03-28 RX ADMIN — DIATRIZOATE MEGLUMINE AND DIATRIZOATE SODIUM 30 ML: 600; 100 SOLUTION ORAL; RECTAL at 08:27

## 2022-03-28 RX ADMIN — Medication 20.8 MILLICURIE: at 08:35

## 2022-03-28 RX ADMIN — IOPAMIDOL 85 ML: 612 INJECTION, SOLUTION INTRAVENOUS at 09:27

## 2022-03-29 LAB — CREAT BLDA-MCNC: 0.9 MG/DL (ref 0.6–1.3)

## 2022-03-30 NOTE — PROGRESS NOTES
Chief Complaint  Previous Stage Ib ([id number]) ER/NH positive, HER-2/peter negative right breast cancer with subsequent metastatic disease identified 10/8/2017, history of right pulmonary embolism, cancer related pain, chemotherapy-induced diarrhea, chemotherapy-induced mucositis, chemotherapy-induced hand-foot syndrome    Subjective        History of Present Illness  The patient returns today in follow-up continuing on oral Xeloda 1000 mg in the morning, 1500 mg in the evening for 7 days on followed by 7 days off as well as monthly Xgeva and Eliquis 5 mg twice daily.  Patient is here today to review results from CT scan and bone scan performed 3/28/2022.  She is next due for Xgeva on 4/4/2022.  In the interval, she has continued with severe difficulty regarding hand-foot symptoms from Xeloda.  Her hands have worsened further with erythema and skin peeling involving the palms with some extension onto the dorsal aspect of the hands.  The degree of sclerodactyly has worsened again and is limiting her dexterity.  She is not having any other significant side effects from Xeloda.  She continues to use emollient cream frequently up to 5 times per day and is resumed using triamcinolone 0.1% twice daily topically a few days ago (uses this for 14 days on followed by 14 days off as recommended by dermatology).  The patient notes that she has chronic pain issues involving her low back and shoulders, particularly on the left side that wax and wane over time.  She does continue using Ultram 50 mg every 8 hours as needed.  She does continue follow-up in supportive oncology, was last seen on 3/8/2022.  She does continue on Provigil 100 mg daily, Cymbalta 30 mg twice daily.  She does have quite a bit to deal with as her  is undergoing chemotherapy and her autistic son has moved back into the home with them.  She feels however that she is coping reasonably well currently.  She does have mild ongoing fatigue.  She remains  "active, ECOG performance status of 1.  She has not been taking the time to walk as much on a daily basis for exercise.      Objective   Vital Signs:   /82   Pulse 91   Temp 97.8 °F (36.6 °C) (Temporal)   Resp 17   Ht 156 cm (61.42\")   Wt 86.3 kg (190 lb 3.2 oz)   SpO2 95%   BMI 35.45 kg/m²     Physical Exam  Constitutional:       Appearance: She is well-developed.   Eyes:      Conjunctiva/sclera: Conjunctivae normal.   Neck:      Thyroid: No thyromegaly.   Cardiovascular:      Rate and Rhythm: Normal rate and regular rhythm.      Heart sounds: No murmur heard.    No friction rub. No gallop.   Pulmonary:      Effort: No respiratory distress.      Breath sounds: Normal breath sounds.   Chest:   Breasts:      Right: No supraclavicular adenopathy.      Left: No supraclavicular adenopathy.       Abdominal:      General: Bowel sounds are normal. There is no distension.      Palpations: Abdomen is soft.      Tenderness: There is no abdominal tenderness.   Lymphadenopathy:      Head:      Right side of head: No submandibular adenopathy.      Cervical: No cervical adenopathy.      Upper Body:      Right upper body: No supraclavicular adenopathy.      Left upper body: No supraclavicular adenopathy.   Skin:     General: Skin is warm and dry.      Findings: Rash present.      Comments: Progressive severe palmar erythema with skin peeling diffusely and extension into the dorsal aspect of the hands.  The degree of involvement has increased considerably since last visit.   Neurological:      Mental Status: She is alert and oriented to person, place, and time.      Cranial Nerves: No cranial nerve deficit.      Motor: No abnormal muscle tone.      Deep Tendon Reflexes: Reflexes normal.   Psychiatric:         Behavior: Behavior normal.        Result Review : Reviewed CT chest abdomen pelvis and bone scan from 3/28/2022.  Reviewed records from supportive oncology.     Assessment and Plan     1. Previous Stage Ib " ([id number]) right breast cancer:  · Diagnosed May 2010 with excisional biopsy for invasive ductal carcinoma, 1.3 cm, grade 2, ER 90%, RI 80%, HER-2/peter negative (1+ IHC).    · Subsequent right mastectomy in July 2010 with no residual breast malignancy, 1/5 sentinel lymph node with micrometastasis (0.25 mm).    · Treated in the [facility] system with adjuvant AC ×4 cycles in 2010 (no taxanes administered due to underlying Charcot-Saloni-Tooth with peripheral neuropathy).    · Adjuvant Femara (postmenopausal) initiated October 2010 with plan to treat ×10 years.    · Genetic testing reportedly negative.    · Developed osteopenia treated with Prolia beginning 2/27/13. Subsequently discontinued due to identification of metastatic disease.  2. Recurrent/metastatic disease identified 10/8/17:  · Disease involving thoracic spine with cord compression at T6, lumbosacral involvement, sternal and right sternoclavicular involvement.    · Femara discontinued in 10/2017.    · Radiation administered (in the [facility] system) to the thoracic spine beginning 10/19/17 treating T3-T9 to a dose of 24 gray in 6 fractions.  · Evaluation with MRI 12/8/17 showing persistent T6 cord compression with persistent neurologic compromise requiring surgical treatment 12/11/17 with T6 laminectomy/corpectomy and T3-T9 fusion.  Pathology with metastatic carcinoma of breast origin, ER negative, RI negative, HER-2/peter negative (1+ IHC).  · Additional staging evaluation 12/8/17 with no evidence of visceral metastatic disease, bone scan showing involvement of thoracic spine, sternum, left humerus, mid frontal bone.  No plane film correlate of left humerus lesion.  MRI lumbar spine with small intradural L3 metastasis.  CA 15-3 12/6/17- 17.  · Palliative radiation therapy to L3 dural metastasis and left humerus initiated 1/15/18 (10 fractions), completed 1/26/18.  · Hypercalcemia of malignancy with calcium in the 10-11 range.  · Initiation of monthly Xgeva  1/23/18.  · Baseline CT scan 1/30/18 with no evidence of visceral involvement.  Cluster of nodular opacities in the right lower lobe suspected to be infectious or related to bronchiolitis. Bone scan 1/30/18 showed postsurgical change in the thoracic spine, stable uptake in the frontal bone, no new areas of disease.  · Initiation of palliative oral single agent Xeloda 2/7/18 2000 mg a.m., 1500 mg p.m. for 14/21 days.   · Following 3 cycles xeloda, bone scan 4/4/18 showed no change from the prior study.  CT scan 4/4/18 showed a small pericardial effusion of unclear significance as well as a subcutaneous nodule in the right lateral chest wall.  Subsequent evaluation with echocardiogram 4/17/18 showed no evidence of pericardial effusion.  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  We discussed the possibility of surgical excision of the right subcutaneous chest wall lesion for more definitive diagnosis.  Reviewed previous CT images dating back to 12/8/17 and the lesion was present even at that time measuring around 1.7 cm although not commented on in the radiology report.  As this appears to be an indolent low-grade process unrelated to her breast cancer, recommendee foregoing surgical excision at this time and monitoring this area on future scans.  The patient agreed.    · Following 6 cycles of Xeloda, CT 6/6/18  showed stable findings, no evidence of progressive disease.  There was a comment regarding subcutaneous abnormality in the anterior abdominal wall and this was related to Lovenox injection sites.  Bone scan 6/6/18 showed no interval change.   · CT scan and bone scan 8/13/18 following 9 cycles of Xeloda showed stable findings with no evidence of significant visceral metastases.  Her bone lesions appear stable on bone scan.  The spindle cell neoplasm in her right chest  wall actually decreased in size from 2 cm down to 1.6 cm.    · The patient experienced some symptoms of diarrhea, anorexia, generalized weakness during cycle 9 Xeloda it was unclear whether this was related to a viral gastroenteritis or toxicity from treatment.  Symptoms recurred during cycle 10 and treatment was cut short by 2 days.  Symptoms attributed to Xeloda.  With cycle 11, dose and schedule altered to 1500 mg twice daily for 7 days on followed by 7 days off .  · CT scan 9/9/2020 with no significant changes.  Bone scan 9/9/2020 read as unchanged from prior studies however did note an area of slight activity in the medial left femur.  Contacted radiology and although this was not noted on prior reports appears to have been present.  Subsequent MRI left femur 9/21/2020 with cortical thickening and periosteal edema left iliac us muscle insertion to the medial left femur with no evidence of metastatic disease, favored to represent periosteal change secondary to insertional tendinitis.  · Severe hand-foot symptoms causing sclerodactyly and limitation in finger movement prompted change in dosing in July 2021 with Xeloda decreased to 1000 mg a.m., 1500 mg p.m. for 7 days on followed by 7 days off.  · Most recent interval scans from 3/28/2022 with CT chest abdomen pelvis, bone scan performed.  CT scans showed no significant change from prior study.  Bone scan showed stable findings, in particular no changes in the right ischio pubic ramus there was question on prior study.  · The patient returns today continuing on treatment with Xeloda 1000 mg a.m., 1500 mg p.m. 7 days on followed by 7 days off.  She also continues on monthly Xgeva that is due next on 4/4/2022.  The patient returns today continuing on a week off of Xeloda with the above-mentioned CT scan and bone scan to review from 3/28/2022.  We did perform scans this time at a 2-month interval due to findings on the last set of scans with bone scan that showed  questionable increased activity in the right inferior ischio pubic ramus.  Fortunately the current scan shows no change on either the CT or the bone scan.  The patient is experiencing worsening difficulty with fairly severe hand-foot syndrome with skin peeling extending onto the dorsal aspect of the hands and causing sclerodactyly and difficulty with dexterity.  I do believe at this point that we need to take a break in therapy to allow time for recovery in terms of her hand-foot symptoms.  Therefore I have asked her not to begin the next week of Xeloda that she would be due to begin on 4/4/2022.  She will receive monthly Xgeva that day on schedule.  She will return on 4/11/2022 with nurse practitioner visit to reassess status of her hand-foot symptoms and consider resuming Xeloda.  Depending on the status of her symptoms will consider whether there should be any further dose reduction performed order resume at prior dose.  Plan to see her back on 5/2/2022 which she will again be due for Xgeva.  We will anticipate repeat CT and bone scan at a 3-month interval.  She will continue to use emollient cream frequently (currently 5 times per day) and she is currently using her topical triamcinolone 0.1% twice daily cream, was instructed by dermatology to use this for 2 weeks on followed by 2 off (now 1 to 2 days and to 2 weeks of use).  3. Right pulmonary embolism:  · Diagnosed on CT angiogram 10/21/17 involving small right lower lobe pulmonary artery.  Lower extremity Dopplers negative.  · Bilateral lower extremity Dopplers negative again 12/5/17.  · Received chronic Lovenox 1 mg/kg twice per day, transition to oral Eliquis in February 2019, continuing on Eliquis 5 mg twice daily.  · The patient has had no bleeding difficulties on anticoagulation  4. Cancer related pain:  · Previously receiving Duragesic 50 µg patch every 72 hours along with Dilaudid 4 mg as needed for breakthrough pain  · The patient's pain improved over  time and she was able to discontinue both Duragesic and Dilaudid in the interval.  · Patient does take occasional Flexeril at bedtime due to back spasm/pain when she has been more active.  · The patient does have some occasional aggravation of her chronic back pain and does use tramadol 50 mg every 8 hours as needed  · Patient's pain is stable.  She does continue to use tramadol 50 mg every 8-12 hours as needed which has been effective.  The patient does have chronic difficulty with pain involving her low back, left shoulder.  Physical therapy has helped to produce some relief.  Patient reports the pain is stable  5. Chemotherapy-induced diarrhea with subsequent C. difficile colitis in the setting of previous ulcerative colitis:  · Patient with reported history of ulcerative colitis, is not on active therapy.  · The patient developed initial diarrhea related to Xeloda at regular dosing.  · Symptoms improved on reduced dose Xeloda  · Flare of symptoms in October 2018 with apparent finding of C. difficile colitis by GI, treated with course of oral vancomycin with improvement in symptoms.  · Patient notes minimal intermittent diarrhea/loose stools on Xeloda requiring occasional dosing of Imodium.  Bowel function recently has been stable, has required infrequent dosing of Imodium recently.  6. Traumatic left tibia/fibular fracture:  · Status post ORIF 12/6/17  · Specimen was sent for pathologic review, negative for evidence of malignancy  7. Hypercalcemia:  · Suspect hypercalcemia of malignancy, calcium in  10-11 range previously.  · Calcium normalized following initiation of monthly Xgeva on 1/23/18.  8. Chemotherapy-induced mucositis:  · Patient had a minimal degree of mucositis with cycle 2.  The patient has magic mouthwash to use as needed.  No subsequent mucositis.  9. Recurrent UTI, bladder wall thickening on CT:  · Patient had an enterococcal UTI on 3/2/18 sensitive to nitrofurantoin and received treatment,  unclear how long.  · Recurrent UTI 3/20/18 with urine culture growing Klebsiella, initially treated with nitrofurantoin, transitioned to Levaquin.  · CT 4/4/18 with diffuse bladder wall thickening with increased nodular thickening at the left base.  Referral to urogynecology Dr. May Johnson.  She was placed on a prophylactic dose of trimethoprim 100 mg daily, bladder wall thickening felt to be related to recent recurrent urinary tract infections.  · Patient with urinary symptoms, treated with course of Macrobid at the end of December 2018, urine culture however was negative 12/31/18.  · Patient was found to have Klebsiella UTI 7/29/2019 which was successfully treated with Macrobid with complete resolution of symptoms.  · CT 8/10/2021 with diffuse bladder wall thickening new from 5/17/2021 (however seen on multiple prior scans).    · UTI on 10/18/2021 with E. coli greater than 100,000 colonies that was pansensitive, treated with Macrobid x7 days  · With ongoing/recurrent UTIs patient was seen by urogynecology and initiated suppressive therapy with trimethoprim 100 mg daily in December 2021.  She has not experienced any further urinary tract infections.  · CT abdomen pelvis 3/28/2022 does show diffuse thickening of urinary bladder as has been seen on prior studies indicative of cystitis.  10.   Mobility:  · The patient underwent an intensive course of rehabilitation at [facility].  She graduated from her outpatient course November 2018.  · Overall the patient has improved dramatically in terms of mobility,   · Patient reports that she has not been walking for exercise as much recently and intends to get back to this.  She had been walking up to a mile daily.  11.  Depression:  · The patient is continuing follow-up in the supportive oncology clinic and is currently continuing on Cymbalta 30 mg twice daily as well as gabapentin 600 mg nightly.  · The patient feels that her depression symptoms are currently fairly well  controlled.  She has seen some benefit from attending support groups at [facility] which she plans to continue.  · The patient does continue follow-up in supportive oncology clinic, last seen on 3/8/2022.  Patient reports that her symptoms are under reasonable control although she does have quite a few stressors with her 's malignancy and chemotherapy as well as her autistic son who is living with them at home.  12. 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  Symptoms did improve and she continued on the same dose.    · Patient was referred to dermatology and has been continuing on triamcinolone 0.1% cream used for 1 week on followed by 1 week off which led to some further improvement.   · Progressive palmar erythema with development of sclerodactyly and effect on dexterity.  Addition of urea-based cream 3 times daily.  · Patient with continued difficulty regarding erythema of her hands that extended onto the dorsal aspect and was causing contractures/sclerodactyly in her fingers affecting her dexterity.  In July 2021, held Xeloda for an additional week and then reduced dose from 1500 mg twice daily down to 1000 mg a.m. and 1500 mg p.m. and continued on a 7-day on followed by 7-day off schedule.  · With reduction in Xeloda dose, slight improvement in erythema involving dorsal aspect of the hands and slight decrease in sclerodactyly  · See above discussion.  Patient with severe hand-foot syndrome, worse in the hands with skin peeling, extension onto the dorsal aspect of the hands, and worsening sclerodactyly as she has experienced in the past.  I have recommended a break in therapy.  Patient will not begin her week of Xeloda on schedule 4/4/22.  We will reassess her status on 4/11/2022 and determine whether to resume treatment at  that time and if treatment is resumed whether to perform any dose alteration.  Patient will continue to use emollient cream frequently (currently 5 times daily) and topical triamcinolone 0.1% twice daily (patient is on 2 weeks off as instructed by dermatology).  13. Evidence of steatosis on scans with mild intermittent elevated liver function studies:  · Liver function studies increased 8/20/19 with ALT 98, AST 70, normal total bilirubin.  · Negative viral hepatitis A, B, and C panel 8/23/18  · Likely related to hepatic steatosis.   · Subsequent improvement in LFTs  · LFTs normal today  14. Chemotherapy induced leukopenia:  · WBC today  4.27  15. GERD:  · Patient with significant history of reflux  · Patient currently receiving Prilosec 20 mg twice daily and Carafate 1 g 4 times daily  · Patient did follow-up with Dr. Ocasio and is scheduled for EGD and colonoscopy which has been postponed until April.  I did discuss with the patient that given her underlying metastatic breast cancer, it is not absolutely necessary for her to undergo any screening endoscopic procedures however she does wish to proceed.  16. Insomnia:  · Patient with prior paradoxical reaction to Benadryl  · Improved previously on temazepam 15 mg nightly as needed.   · Patient noted subsequently that temazepam was having no effect.   · Symptoms currently stable on gabapentin 600 mg nightly  · Patient has been continuing on temazepam 15 mg nightly, does report some effectiveness.    17. Health maintenance:  · Patient notes that she has a history of colon polyps as well as ulcerative colitis and was due for a follow-up colonoscopy on 9/12/2019.  We did discuss there is no necessity to pursue colonoscopy in the setting of her metastatic breast cancer.    · The patient did undergo colonoscopy on 2/7/2020 with findings of muscular hypertrophy and diverticulosis.   · See above discussion, note patient plans to proceed with colonoscopy in April  2022  18. Right shoulder pain:  · Patient was evaluated by Dr Forrester.  She has experienced difficulty over a long time frame with right shoulder although she has not complained of this in prior visits to our office.  She reported difficulty with abduction.    · The patient did undergo MRI of her right shoulder on 11/21/2019 at an outside facility showing multiple abnormalities including supraspinatus tendinosis, labral tear but no evidence of metastatic disease.  · Patient developed pain in the left shoulder, was seen by orthopedics and underwent steroid injection with some improvement.  Right shoulder is stable currently.  Patient did undergo physical therapy with some improvement.  She continues to use tramadol 50 mg every 8-12 hours as needed.  19. Ocular changes in part related to Xeloda:  · Patient experienced a mild degree of blurred vision as well as burning and pruritus.  · She was seen by her ophthalmologist and was placed on xiidra ophthalmic drops which have helped.  · Likely both issues are to some extent related to Xeloda.  · Symptoms did worsen and patient was seen by a new ophthalmologist at [facility].  She is now using an ocular lubricant at night in addition to artificial tears which have helped.  20. Elevated glucose:  · It is noted the patient's glucose at the last few visits has been in the high 100 range, postprandial.  · She has had a hemoglobin A1c that has been in the high 5-6 range in the past, on 5/1/2019 at 5.7  · Hemoglobin A1c on 5/7/2021 was improved at 5.2  21. Possible loosening of pedicle screw at T3:  · CT cervical spine 5/17/2021 showed loosening of the left pedicle screw at T3.  Patient referred to orthopedics and was seen by Dr. Nayak who felt that there were no concerning findings on the scan  22. COVID-19 vaccination  · Patient received the Pfizer vaccine, second dose administered on 4/2/2021 without side effects.  · Patient received her third dose Pfizer  COVID-19 injection in August 2021     Plan:  1. Hold Xeloda that was due to resume on 4/4/2022 due to worsening toxicity with hand-foot syndrome  2. Patient will return on 4/4/2022 with CBC, CMP, magnesium, phosphorus and will receive monthly Xgeva  3. Nurse practitioner visit on 4/11/2022 with CBC, CMP.  We will assess status of the patient's hand-foot symptoms and decide whether to resume Xeloda at that time.  Patient receives Xeloda 1000 mg a.m., 1500 mg in the p.m. 7 days on followed by 7 days off  4. Continue Eliquis 5 mg twice daily, refill prescription sent today.  5. The patient will continue frequent use of emollient cream currently 5 times daily and add continue triamcinolone cream 0.1% twice daily (provided by dermatology) 2 weeks on followed by 2 weeks off as prescribed (resumed 1 to 2 days ago)  6. Continue omeprazole 20 mg twice daily  7. Continue Carafate 1 g 4 times daily   8. Continue ocular lubricating gel nightly per ophthalmology  9. Continue Provigil 100 mg daily and Cymbalta 30 mg twice daily per supportive oncology clinic.  Patient continues routine follow-up with supportive oncology.  10. Continue temazepam 15 mg nightly.  11. Patient continues on trimethoprim 100 mg daily that was prescribed by urogynecology for ongoing suppressive therapy for UTIs  12. Patient notes that EGD/colonoscopy has been postponed until April 2022.  13. MD visit 5/2/2022 with CBC, CMP, magnesium, phosphorus and patient will be due for Xgeva.  We anticipate 3-month interval CT chest abdomen pelvis and bone scan.     Patient continuing on high risk medication requiring intensive monitoring.              I did spend 40 minutes in total time caring for the patient today, time spent in review of records, face-to-face consultation, coordination of care, placement of orders, completion of documentation

## 2022-03-31 ENCOUNTER — OFFICE VISIT (OUTPATIENT)
Dept: ONCOLOGY | Facility: CLINIC | Age: 62
End: 2022-03-31

## 2022-03-31 ENCOUNTER — APPOINTMENT (OUTPATIENT)
Dept: LAB | Facility: HOSPITAL | Age: 62
End: 2022-03-31

## 2022-03-31 VITALS
HEIGHT: 61 IN | SYSTOLIC BLOOD PRESSURE: 117 MMHG | DIASTOLIC BLOOD PRESSURE: 82 MMHG | RESPIRATION RATE: 17 BRPM | OXYGEN SATURATION: 95 % | WEIGHT: 190.2 LBS | HEART RATE: 91 BPM | BODY MASS INDEX: 35.91 KG/M2 | TEMPERATURE: 97.8 F

## 2022-03-31 DIAGNOSIS — Z17.1 MALIGNANT NEOPLASM OF OVERLAPPING SITES OF RIGHT BREAST IN FEMALE, ESTROGEN RECEPTOR NEGATIVE: Primary | ICD-10-CM

## 2022-03-31 DIAGNOSIS — C50.811 MALIGNANT NEOPLASM OF OVERLAPPING SITES OF RIGHT BREAST IN FEMALE, ESTROGEN RECEPTOR NEGATIVE: Primary | ICD-10-CM

## 2022-03-31 PROCEDURE — 99215 OFFICE O/P EST HI 40 MIN: CPT | Performed by: INTERNAL MEDICINE

## 2022-03-31 RX ORDER — LOSARTAN POTASSIUM 50 MG/1
50 TABLET ORAL DAILY
Qty: 90 TABLET | Refills: 3 | Status: SHIPPED | OUTPATIENT
Start: 2022-03-31 | End: 2022-06-14 | Stop reason: DRUGHIGH

## 2022-04-01 ENCOUNTER — SPECIALTY PHARMACY (OUTPATIENT)
Dept: PHARMACY | Facility: HOSPITAL | Age: 62
End: 2022-04-01

## 2022-04-04 ENCOUNTER — LAB (OUTPATIENT)
Dept: OTHER | Facility: HOSPITAL | Age: 62
End: 2022-04-04

## 2022-04-04 ENCOUNTER — INFUSION (OUTPATIENT)
Dept: ONCOLOGY | Facility: HOSPITAL | Age: 62
End: 2022-04-04

## 2022-04-04 VITALS — TEMPERATURE: 96.9 F

## 2022-04-04 DIAGNOSIS — Z17.1 MALIGNANT NEOPLASM OF OVERLAPPING SITES OF RIGHT BREAST IN FEMALE, ESTROGEN RECEPTOR NEGATIVE: Primary | ICD-10-CM

## 2022-04-04 DIAGNOSIS — C50.811 MALIGNANT NEOPLASM OF OVERLAPPING SITES OF RIGHT BREAST IN FEMALE, ESTROGEN RECEPTOR NEGATIVE: Primary | ICD-10-CM

## 2022-04-04 DIAGNOSIS — C50.811 MALIGNANT NEOPLASM OF OVERLAPPING SITES OF RIGHT BREAST IN FEMALE, ESTROGEN RECEPTOR NEGATIVE: ICD-10-CM

## 2022-04-04 DIAGNOSIS — Z17.1 MALIGNANT NEOPLASM OF OVERLAPPING SITES OF RIGHT BREAST IN FEMALE, ESTROGEN RECEPTOR NEGATIVE: ICD-10-CM

## 2022-04-04 LAB
ALBUMIN SERPL-MCNC: 4.1 G/DL (ref 3.5–5.2)
ALBUMIN/GLOB SERPL: 2 G/DL
ALP SERPL-CCNC: 70 U/L (ref 39–117)
ALT SERPL W P-5'-P-CCNC: 16 U/L (ref 1–33)
ANION GAP SERPL CALCULATED.3IONS-SCNC: 9.1 MMOL/L (ref 5–15)
AST SERPL-CCNC: 20 U/L (ref 1–32)
BASOPHILS # BLD AUTO: 0.04 10*3/MM3 (ref 0–0.2)
BASOPHILS NFR BLD AUTO: 1 % (ref 0–1.5)
BILIRUB SERPL-MCNC: 0.4 MG/DL (ref 0–1.2)
BUN SERPL-MCNC: 26 MG/DL (ref 8–23)
BUN/CREAT SERPL: 26 (ref 7–25)
CALCIUM SPEC-SCNC: 9.2 MG/DL (ref 8.6–10.5)
CHLORIDE SERPL-SCNC: 104 MMOL/L (ref 98–107)
CO2 SERPL-SCNC: 29.9 MMOL/L (ref 22–29)
CREAT SERPL-MCNC: 1 MG/DL (ref 0.57–1)
DEPRECATED RDW RBC AUTO: 66.1 FL (ref 37–54)
EGFRCR SERPLBLD CKD-EPI 2021: 64.2 ML/MIN/1.73
EOSINOPHIL # BLD AUTO: 0.14 10*3/MM3 (ref 0–0.4)
EOSINOPHIL NFR BLD AUTO: 3.4 % (ref 0.3–6.2)
ERYTHROCYTE [DISTWIDTH] IN BLOOD BY AUTOMATED COUNT: 18.4 % (ref 12.3–15.4)
GLOBULIN UR ELPH-MCNC: 2.1 GM/DL
GLUCOSE SERPL-MCNC: 60 MG/DL (ref 65–99)
HCT VFR BLD AUTO: 36.4 % (ref 34–46.6)
HGB BLD-MCNC: 11.6 G/DL (ref 12–15.9)
IMM GRANULOCYTES # BLD AUTO: 0.01 10*3/MM3 (ref 0–0.05)
IMM GRANULOCYTES NFR BLD AUTO: 0.2 % (ref 0–0.5)
LYMPHOCYTES # BLD AUTO: 0.98 10*3/MM3 (ref 0.7–3.1)
LYMPHOCYTES NFR BLD AUTO: 23.5 % (ref 19.6–45.3)
MAGNESIUM SERPL-MCNC: 2.1 MG/DL (ref 1.6–2.4)
MCH RBC QN AUTO: 31.7 PG (ref 26.6–33)
MCHC RBC AUTO-ENTMCNC: 31.9 G/DL (ref 31.5–35.7)
MCV RBC AUTO: 99.5 FL (ref 79–97)
MONOCYTES # BLD AUTO: 0.66 10*3/MM3 (ref 0.1–0.9)
MONOCYTES NFR BLD AUTO: 15.8 % (ref 5–12)
NEUTROPHILS NFR BLD AUTO: 2.34 10*3/MM3 (ref 1.7–7)
NEUTROPHILS NFR BLD AUTO: 56.1 % (ref 42.7–76)
NRBC BLD AUTO-RTO: 0 /100 WBC (ref 0–0.2)
PHOSPHATE SERPL-MCNC: 3.9 MG/DL (ref 2.5–4.5)
PLATELET # BLD AUTO: 247 10*3/MM3 (ref 140–450)
PMV BLD AUTO: 10 FL (ref 6–12)
POTASSIUM SERPL-SCNC: 4.2 MMOL/L (ref 3.5–5.2)
PROT SERPL-MCNC: 6.2 G/DL (ref 6–8.5)
RBC # BLD AUTO: 3.66 10*6/MM3 (ref 3.77–5.28)
SODIUM SERPL-SCNC: 143 MMOL/L (ref 136–145)
WBC NRBC COR # BLD: 4.17 10*3/MM3 (ref 3.4–10.8)

## 2022-04-04 PROCEDURE — 80053 COMPREHEN METABOLIC PANEL: CPT | Performed by: INTERNAL MEDICINE

## 2022-04-04 PROCEDURE — 84100 ASSAY OF PHOSPHORUS: CPT | Performed by: INTERNAL MEDICINE

## 2022-04-04 PROCEDURE — 96372 THER/PROPH/DIAG INJ SC/IM: CPT

## 2022-04-04 PROCEDURE — 85025 COMPLETE CBC W/AUTO DIFF WBC: CPT | Performed by: INTERNAL MEDICINE

## 2022-04-04 PROCEDURE — 25010000002 DENOSUMAB 120 MG/1.7ML SOLUTION: Performed by: INTERNAL MEDICINE

## 2022-04-04 PROCEDURE — 36415 COLL VENOUS BLD VENIPUNCTURE: CPT

## 2022-04-04 PROCEDURE — 83735 ASSAY OF MAGNESIUM: CPT | Performed by: INTERNAL MEDICINE

## 2022-04-04 RX ADMIN — DENOSUMAB 120 MG: 120 INJECTION SUBCUTANEOUS at 10:11

## 2022-04-04 NOTE — NURSING NOTE
Arrived ambulatory with cane for Xgeva injection. Indication and side effects reviewed. Denies recent dental work. Labs and medications verified. Xgeva administered in left arm without incidence. Instructed to call  for any concerns or questions.  Pt vu and discharged ambulatory.

## 2022-04-11 ENCOUNTER — OFFICE VISIT (OUTPATIENT)
Dept: ONCOLOGY | Facility: CLINIC | Age: 62
End: 2022-04-11

## 2022-04-11 ENCOUNTER — LAB (OUTPATIENT)
Dept: OTHER | Facility: HOSPITAL | Age: 62
End: 2022-04-11

## 2022-04-11 VITALS
HEART RATE: 98 BPM | HEIGHT: 61 IN | WEIGHT: 190.3 LBS | OXYGEN SATURATION: 96 % | DIASTOLIC BLOOD PRESSURE: 85 MMHG | RESPIRATION RATE: 20 BRPM | SYSTOLIC BLOOD PRESSURE: 134 MMHG | BODY MASS INDEX: 35.93 KG/M2 | TEMPERATURE: 97.3 F

## 2022-04-11 DIAGNOSIS — Z17.1 MALIGNANT NEOPLASM OF OVERLAPPING SITES OF RIGHT BREAST IN FEMALE, ESTROGEN RECEPTOR NEGATIVE: Primary | ICD-10-CM

## 2022-04-11 DIAGNOSIS — L27.1 PALMAR PLANTAR ERYTHRODYSAESTHESIA DUE TO CYTOTOXIC THERAPY: ICD-10-CM

## 2022-04-11 DIAGNOSIS — Z17.1 MALIGNANT NEOPLASM OF OVERLAPPING SITES OF RIGHT BREAST IN FEMALE, ESTROGEN RECEPTOR NEGATIVE: ICD-10-CM

## 2022-04-11 DIAGNOSIS — C50.811 MALIGNANT NEOPLASM OF OVERLAPPING SITES OF RIGHT BREAST IN FEMALE, ESTROGEN RECEPTOR NEGATIVE: Primary | ICD-10-CM

## 2022-04-11 DIAGNOSIS — C50.811 MALIGNANT NEOPLASM OF OVERLAPPING SITES OF RIGHT BREAST IN FEMALE, ESTROGEN RECEPTOR NEGATIVE: ICD-10-CM

## 2022-04-11 LAB
ALBUMIN SERPL-MCNC: 4.2 G/DL (ref 3.5–5.2)
ALBUMIN/GLOB SERPL: 1.9 G/DL
ALP SERPL-CCNC: 70 U/L (ref 39–117)
ALT SERPL W P-5'-P-CCNC: 15 U/L (ref 1–33)
ANION GAP SERPL CALCULATED.3IONS-SCNC: 9.8 MMOL/L (ref 5–15)
AST SERPL-CCNC: 20 U/L (ref 1–32)
BASOPHILS # BLD AUTO: 0.06 10*3/MM3 (ref 0–0.2)
BASOPHILS NFR BLD AUTO: 1.1 % (ref 0–1.5)
BILIRUB SERPL-MCNC: 0.4 MG/DL (ref 0–1.2)
BUN SERPL-MCNC: 26 MG/DL (ref 8–23)
BUN/CREAT SERPL: 24.1 (ref 7–25)
CALCIUM SPEC-SCNC: 9.7 MG/DL (ref 8.6–10.5)
CHLORIDE SERPL-SCNC: 101 MMOL/L (ref 98–107)
CO2 SERPL-SCNC: 30.2 MMOL/L (ref 22–29)
CREAT SERPL-MCNC: 1.08 MG/DL (ref 0.57–1)
DEPRECATED RDW RBC AUTO: 63.2 FL (ref 37–54)
EGFRCR SERPLBLD CKD-EPI 2021: 58.6 ML/MIN/1.73
EOSINOPHIL # BLD AUTO: 0.27 10*3/MM3 (ref 0–0.4)
EOSINOPHIL NFR BLD AUTO: 4.8 % (ref 0.3–6.2)
ERYTHROCYTE [DISTWIDTH] IN BLOOD BY AUTOMATED COUNT: 17.5 % (ref 12.3–15.4)
GLOBULIN UR ELPH-MCNC: 2.2 GM/DL
GLUCOSE SERPL-MCNC: 111 MG/DL (ref 65–99)
HCT VFR BLD AUTO: 38.8 % (ref 34–46.6)
HGB BLD-MCNC: 12.4 G/DL (ref 12–15.9)
IMM GRANULOCYTES # BLD AUTO: 0.02 10*3/MM3 (ref 0–0.05)
IMM GRANULOCYTES NFR BLD AUTO: 0.4 % (ref 0–0.5)
LYMPHOCYTES # BLD AUTO: 1.31 10*3/MM3 (ref 0.7–3.1)
LYMPHOCYTES NFR BLD AUTO: 23.1 % (ref 19.6–45.3)
MCH RBC QN AUTO: 31.6 PG (ref 26.6–33)
MCHC RBC AUTO-ENTMCNC: 32 G/DL (ref 31.5–35.7)
MCV RBC AUTO: 99 FL (ref 79–97)
MONOCYTES # BLD AUTO: 0.56 10*3/MM3 (ref 0.1–0.9)
MONOCYTES NFR BLD AUTO: 9.9 % (ref 5–12)
NEUTROPHILS NFR BLD AUTO: 3.44 10*3/MM3 (ref 1.7–7)
NEUTROPHILS NFR BLD AUTO: 60.7 % (ref 42.7–76)
NRBC BLD AUTO-RTO: 0 /100 WBC (ref 0–0.2)
PLATELET # BLD AUTO: 226 10*3/MM3 (ref 140–450)
PMV BLD AUTO: 10.7 FL (ref 6–12)
POTASSIUM SERPL-SCNC: 4.3 MMOL/L (ref 3.5–5.2)
PROT SERPL-MCNC: 6.4 G/DL (ref 6–8.5)
RBC # BLD AUTO: 3.92 10*6/MM3 (ref 3.77–5.28)
SODIUM SERPL-SCNC: 141 MMOL/L (ref 136–145)
WBC NRBC COR # BLD: 5.66 10*3/MM3 (ref 3.4–10.8)

## 2022-04-11 PROCEDURE — 85025 COMPLETE CBC W/AUTO DIFF WBC: CPT | Performed by: INTERNAL MEDICINE

## 2022-04-11 PROCEDURE — 80053 COMPREHEN METABOLIC PANEL: CPT | Performed by: INTERNAL MEDICINE

## 2022-04-11 PROCEDURE — 99214 OFFICE O/P EST MOD 30 MIN: CPT | Performed by: NURSE PRACTITIONER

## 2022-04-11 PROCEDURE — 36415 COLL VENOUS BLD VENIPUNCTURE: CPT

## 2022-04-11 NOTE — PROGRESS NOTES
"Chief Complaint  Previous Stage Ib ([id number]) ER/LA positive, HER-2/peter negative right breast cancer with subsequent metastatic disease identified 10/8/2017, history of right pulmonary embolism, cancer related pain, chemotherapy-induced diarrhea, chemotherapy-induced mucositis, chemotherapy-induced hand-foot syndrome    Subjective      History of Present Illness  The patient returns today in short-term follow-up.  She saw Dr. Hancock 2 weeks ago and was experiencing cumulative worsening toxicity to Xeloda with sclerodactyly, erythema and peeling of the palms and up into the wrists.  Though she was due to start back on her next round of Xeloda 4/4/2022 she was instructed to hold off on this.  She did return and receive Xgeva this day as scheduled.    She is now reviewed back having held her Xeloda for the last week.  Sclerodactyly is less prominent with the hands softening and being less curled up.  Erythema has lessened.  Skin is peeling significantly at this point.  While she has had improvement her presentation is still significant.  We discussed taking 1 more week off to allow further healing before starting back on Xeloda.  She is in agreement with this.  She denies other concerns this time.    Objective   Vital Signs:   /85   Pulse 98   Temp 97.3 °F (36.3 °C) (Temporal)   Resp 20   Ht 156 cm (61.42\")   Wt 86.3 kg (190 lb 4.8 oz)   SpO2 96%   BMI 35.47 kg/m²     Physical Exam  Constitutional:       Appearance: She is well-developed.   Eyes:      Conjunctiva/sclera: Conjunctivae normal.   Neck:      Thyroid: No thyromegaly.   Cardiovascular:      Rate and Rhythm: Normal rate and regular rhythm.      Heart sounds: No murmur heard.    No friction rub. No gallop.   Pulmonary:      Effort: No respiratory distress.      Breath sounds: Normal breath sounds.   Chest:   Breasts:      Right: No supraclavicular adenopathy.      Left: No supraclavicular adenopathy.       Abdominal:      General: Bowel sounds " are normal. There is no distension.      Palpations: Abdomen is soft.      Tenderness: There is no abdominal tenderness.   Lymphadenopathy:      Head:      Right side of head: No submandibular adenopathy.      Cervical: No cervical adenopathy.      Upper Body:      Right upper body: No supraclavicular adenopathy.      Left upper body: No supraclavicular adenopathy.   Skin:     General: Skin is warm and dry.      Findings: Rash present.      Comments: Palmar erythema improving, skin peeling still diffusely present.  Sclerodactyly less.  Overall improvement compared to previous exam.   Neurological:      Mental Status: She is alert and oriented to person, place, and time.      Cranial Nerves: No cranial nerve deficit.      Motor: No abnormal muscle tone.      Deep Tendon Reflexes: Reflexes normal.   Psychiatric:         Behavior: Behavior normal.        Result Review :   Results from last 7 days   Lab Units 04/11/22  0948   WBC 10*3/mm3 5.66   NEUTROS ABS 10*3/mm3 3.44   HEMOGLOBIN g/dL 12.4   HEMATOCRIT % 38.8   PLATELETS 10*3/mm3 226     Results from last 7 days   Lab Units 04/11/22  0948   SODIUM mmol/L 141   POTASSIUM mmol/L 4.3   CHLORIDE mmol/L 101   CO2 mmol/L 30.2*   BUN mg/dL 26*   CREATININE mg/dL 1.08*   CALCIUM mg/dL 9.7   ALBUMIN g/dL 4.20   BILIRUBIN mg/dL 0.4   ALK PHOS U/L 70   ALT (SGPT) U/L 15   AST (SGOT) U/L 20   GLUCOSE mg/dL 111*              Assessment and Plan     1. Previous Stage Ib ([id number]) right breast cancer:  · Diagnosed May 2010 with excisional biopsy for invasive ductal carcinoma, 1.3 cm, grade 2, ER 90%, ME 80%, HER-2/peter negative (1+ IHC).    · Subsequent right mastectomy in July 2010 with no residual breast malignancy, 1/5 sentinel lymph node with micrometastasis (0.25 mm).    · Treated in the [facility] system with adjuvant AC ×4 cycles in 2010 (no taxanes administered due to underlying Charcot-Saloni-Tooth with peripheral neuropathy).    · Adjuvant Femara (postmenopausal) initiated  October 2010 with plan to treat ×10 years.    · Genetic testing reportedly negative.    · Developed osteopenia treated with Prolia beginning 2/27/13. Subsequently discontinued due to identification of metastatic disease.  2. Recurrent/metastatic disease identified 10/8/17:  · Disease involving thoracic spine with cord compression at T6, lumbosacral involvement, sternal and right sternoclavicular involvement.    · Femara discontinued in 10/2017.    · Radiation administered (in the [facility] system) to the thoracic spine beginning 10/19/17 treating T3-T9 to a dose of 24 gray in 6 fractions.  · Evaluation with MRI 12/8/17 showing persistent T6 cord compression with persistent neurologic compromise requiring surgical treatment 12/11/17 with T6 laminectomy/corpectomy and T3-T9 fusion.  Pathology with metastatic carcinoma of breast origin, ER negative, DE negative, HER-2/peter negative (1+ IHC).  · Additional staging evaluation 12/8/17 with no evidence of visceral metastatic disease, bone scan showing involvement of thoracic spine, sternum, left humerus, mid frontal bone.  No plane film correlate of left humerus lesion.  MRI lumbar spine with small intradural L3 metastasis.  CA 15-3 12/6/17- 17.  · Palliative radiation therapy to L3 dural metastasis and left humerus initiated 1/15/18 (10 fractions), completed 1/26/18.  · Hypercalcemia of malignancy with calcium in the 10-11 range.  · Initiation of monthly Xgeva 1/23/18.  · Baseline CT scan 1/30/18 with no evidence of visceral involvement.  Cluster of nodular opacities in the right lower lobe suspected to be infectious or related to bronchiolitis. Bone scan 1/30/18 showed postsurgical change in the thoracic spine, stable uptake in the frontal bone, no new areas of disease.  · Initiation of palliative oral single agent Xeloda 2/7/18 2000 mg a.m., 1500 mg p.m. for 14/21 days.   · Following 3 cycles xeloda, bone scan 4/4/18 showed no change from the prior study.  CT scan 4/4/18  showed a small pericardial effusion of unclear significance as well as a subcutaneous nodule in the right lateral chest wall.  Subsequent evaluation with echocardiogram 4/17/18 showed no evidence of pericardial effusion.  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  We discussed the possibility of surgical excision of the right subcutaneous chest wall lesion for more definitive diagnosis.  Reviewed previous CT images dating back to 12/8/17 and the lesion was present even at that time measuring around 1.7 cm although not commented on in the radiology report.  As this appears to be an indolent low-grade process unrelated to her breast cancer, recommendee foregoing surgical excision at this time and monitoring this area on future scans.  The patient agreed.    · Following 6 cycles of Xeloda, CT 6/6/18  showed stable findings, no evidence of progressive disease.  There was a comment regarding subcutaneous abnormality in the anterior abdominal wall and this was related to Lovenox injection sites.  Bone scan 6/6/18 showed no interval change.   · CT scan and bone scan 8/13/18 following 9 cycles of Xeloda showed stable findings with no evidence of significant visceral metastases.  Her bone lesions appear stable on bone scan.  The spindle cell neoplasm in her right chest wall actually decreased in size from 2 cm down to 1.6 cm.    · The patient experienced some symptoms of diarrhea, anorexia, generalized weakness during cycle 9 Xeloda it was unclear whether this was related to a viral gastroenteritis or toxicity from treatment.  Symptoms recurred during cycle 10 and treatment was cut short by 2 days.  Symptoms attributed to Xeloda.  With cycle 11, dose and schedule altered to 1500 mg twice daily for 7 days on followed by 7 days off .  · CT scan 9/9/2020 with no significant changes.   Bone scan 9/9/2020 read as unchanged from prior studies however did note an area of slight activity in the medial left femur.  Contacted radiology and although this was not noted on prior reports appears to have been present.  Subsequent MRI left femur 9/21/2020 with cortical thickening and periosteal edema left iliac us muscle insertion to the medial left femur with no evidence of metastatic disease, favored to represent periosteal change secondary to insertional tendinitis.  · Severe hand-foot symptoms causing sclerodactyly and limitation in finger movement prompted change in dosing in July 2021 with Xeloda decreased to 1000 mg a.m., 1500 mg p.m. for 7 days on followed by 7 days off.  · Most recent interval scans from 3/28/2022 with CT chest abdomen pelvis, bone scan performed.  CT scans showed no significant change from prior study.  Bone scan showed stable findings, in particular no changes in the right ischio pubic ramus there was question on prior study.  · Due to progressive steroid Dr. Gabriel, erythema and peeling of the dorsal aspect of the hands and wrists, Xeloda initially scheduled to begin 4/4/2022 held.  · Patient reviewed back today, 4/11/2022 having held Xeloda for the last week.  Sclerodactyly is improving and erythema is less.  She still has significant peeling of her hands.  Discussed taking 1 additional week off.  Expect that she will have further improvement and therefore we will plan to restart Xeloda at a dose of 1000 mg in the a.m., 1500 mg in the p.m. for 7 days on, 7 days off as of 4/18/2022.  She will thereafter be seen again on 5/2/2022 which would be the next time she is due to restart and she will be assessed by Dr. Hancock this day prior to reinitiation.  She is in agreement with this plan.  3. Right pulmonary embolism:  · Diagnosed on CT angiogram 10/21/17 involving small right lower lobe pulmonary artery.  Lower extremity Dopplers negative.  · Bilateral lower extremity Dopplers negative  again 12/5/17.  · Received chronic Lovenox 1 mg/kg twice per day, transition to oral Eliquis in February 2019, continuing on Eliquis 5 mg twice daily.  · The patient has had no bleeding difficulties on anticoagulation  4. Cancer related pain:  · Previously receiving Duragesic 50 µg patch every 72 hours along with Dilaudid 4 mg as needed for breakthrough pain  · The patient's pain improved over time and she was able to discontinue both Duragesic and Dilaudid in the interval.  · Patient does take occasional Flexeril at bedtime due to back spasm/pain when she has been more active.  · The patient does have some occasional aggravation of her chronic back pain and does use tramadol 50 mg every 8 hours as needed  · Patient's pain is stable.  She does continue to use tramadol 50 mg every 8-12 hours as needed which has been effective.  The patient does have chronic difficulty with pain involving her low back, left shoulder.  Physical therapy has helped to produce some relief.  Patient reports the pain is stable  5. Chemotherapy-induced diarrhea with subsequent C. difficile colitis in the setting of previous ulcerative colitis:  · Patient with reported history of ulcerative colitis, is not on active therapy.  · The patient developed initial diarrhea related to Xeloda at regular dosing.  · Symptoms improved on reduced dose Xeloda  · Flare of symptoms in October 2018 with apparent finding of C. difficile colitis by GI, treated with course of oral vancomycin with improvement in symptoms.  · Patient notes minimal intermittent diarrhea/loose stools on Xeloda requiring occasional dosing of Imodium.  Bowel function recently has been stable, has required infrequent dosing of Imodium recently.  6. Traumatic left tibia/fibular fracture:  · Status post ORIF 12/6/17  · Specimen was sent for pathologic review, negative for evidence of malignancy  7. Hypercalcemia:  · Suspect hypercalcemia of malignancy, calcium in  10-11 range  previously.  · Calcium normalized following initiation of monthly Xgeva on 1/23/18.  8. Chemotherapy-induced mucositis:  · Patient had a minimal degree of mucositis with cycle 2.  The patient has magic mouthwash to use as needed.  No subsequent mucositis.  9. Recurrent UTI, bladder wall thickening on CT:  · Patient had an enterococcal UTI on 3/2/18 sensitive to nitrofurantoin and received treatment, unclear how long.  · Recurrent UTI 3/20/18 with urine culture growing Klebsiella, initially treated with nitrofurantoin, transitioned to Levaquin.  · CT 4/4/18 with diffuse bladder wall thickening with increased nodular thickening at the left base.  Referral to urogynecology Dr. May Johnson.  She was placed on a prophylactic dose of trimethoprim 100 mg daily, bladder wall thickening felt to be related to recent recurrent urinary tract infections.  · Patient with urinary symptoms, treated with course of Macrobid at the end of December 2018, urine culture however was negative 12/31/18.  · Patient was found to have Klebsiella UTI 7/29/2019 which was successfully treated with Macrobid with complete resolution of symptoms.  · CT 8/10/2021 with diffuse bladder wall thickening new from 5/17/2021 (however seen on multiple prior scans).    · UTI on 10/18/2021 with E. coli greater than 100,000 colonies that was pansensitive, treated with Macrobid x7 days  · With ongoing/recurrent UTIs patient was seen by urogynecology and initiated suppressive therapy with trimethoprim 100 mg daily in December 2021.  She has not experienced any further urinary tract infections.  · CT abdomen pelvis 3/28/2022 does show diffuse thickening of urinary bladder as has been seen on prior studies indicative of cystitis.  10.   Mobility:  · The patient underwent an intensive course of rehabilitation at [facility].  She graduated from her outpatient course November 2018.  · Overall the patient has improved dramatically in terms of mobility,   · Patient reports  that she has not been walking for exercise as much recently and intends to get back to this.  She had been walking up to a mile daily.  11.  Depression:  · The patient is continuing follow-up in the supportive oncology clinic and is currently continuing on Cymbalta 30 mg twice daily as well as gabapentin 600 mg nightly.  · The patient feels that her depression symptoms are currently fairly well controlled.  She has seen some benefit from attending support groups at [employer] which she plans to continue.  · The patient does continue follow-up in supportive oncology clinic, last seen on 3/8/2022.  Patient reports that her symptoms are under reasonable control although she does have quite a few stressors with her 's malignancy and chemotherapy as well as her autistic son who is living with them at home.  12. 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  Symptoms did improve and she continued on the same dose.    · Patient was referred to dermatology and has been continuing on triamcinolone 0.1% cream used for 1 week on followed by 1 week off which led to some further improvement.   · Progressive palmar erythema with development of sclerodactyly and effect on dexterity.  Addition of urea-based cream 3 times daily.  · Patient with continued difficulty regarding erythema of her hands that extended onto the dorsal aspect and was causing contractures/sclerodactyly in her fingers affecting her dexterity.  In July 2021, held Xeloda for an additional week and then reduced dose from 1500 mg twice daily down to 1000 mg a.m. and 1500 mg p.m. and continued on a 7-day on followed by 7-day off schedule.  · With reduction in Xeloda dose, slight improvement in erythema involving dorsal aspect of the hands and slight decrease in sclerodactyly  · See  above discussion.  Patient will hold Xeloda an additional 1 week and restart 4/18/2022 at same previous dosing of 1000 g in the a.m., 50 mg in the p.m. for 7 days on, 7 days off.  She will also continue use of triamcinolone 0.1% cream twice daily and urea.   13. Evidence of steatosis on scans with mild intermittent elevated liver function studies:  · Liver function studies increased 8/20/19 with ALT 98, AST 70, normal total bilirubin.  · Negative viral hepatitis A, B, and C panel 8/23/18  · Likely related to hepatic steatosis.   · Subsequent improvement in LFTs  · LFTs normal today  14. Chemotherapy induced leukopenia:  · WBC today  5.6  15. GERD:  · Patient with significant history of reflux  · Patient currently receiving Prilosec 20 mg twice daily and Carafate 1 g 4 times daily  · Patient did follow-up with Dr. Ocasio and is scheduled for EGD and colonoscopy which has been postponed until April.  I did discuss with the patient that given her underlying metastatic breast cancer, it is not absolutely necessary for her to undergo any screening endoscopic procedures however she does wish to proceed.  16. Insomnia:  · Patient with prior paradoxical reaction to Benadryl  · Improved previously on temazepam 15 mg nightly as needed.   · Patient noted subsequently that temazepam was having no effect.   · Symptoms currently stable on gabapentin 600 mg nightly  · Patient has been continuing on temazepam 15 mg nightly, does report some effectiveness.    17. Health maintenance:  · Patient notes that she has a history of colon polyps as well as ulcerative colitis and was due for a follow-up colonoscopy on 9/12/2019.  We did discuss there is no necessity to pursue colonoscopy in the setting of her metastatic breast cancer.    · The patient did undergo colonoscopy on 2/7/2020 with findings of muscular hypertrophy and diverticulosis.   · See above discussion, note patient plans to proceed with colonoscopy in April 2022  18. Right  shoulder pain:  · Patient was evaluated by Dr Forrester.  She has experienced difficulty over a long time frame with right shoulder although she has not complained of this in prior visits to our office.  She reported difficulty with abduction.    · The patient did undergo MRI of her right shoulder on 11/21/2019 at an outside facility showing multiple abnormalities including supraspinatus tendinosis, labral tear but no evidence of metastatic disease.  · Patient developed pain in the left shoulder, was seen by orthopedics and underwent steroid injection with some improvement.  Right shoulder is stable currently.  Patient did undergo physical therapy with some improvement.  She continues to use tramadol 50 mg every 8-12 hours as needed.  19. Ocular changes in part related to Xeloda:  · Patient experienced a mild degree of blurred vision as well as burning and pruritus.  · She was seen by her ophthalmologist and was placed on xiidra ophthalmic drops which have helped.  · Likely both issues are to some extent related to Xeloda.  · Symptoms did worsen and patient was seen by a new ophthalmologist at [facility].  She is now using an ocular lubricant at night in addition to artificial tears which have helped.  20. Elevated glucose:  · It is noted the patient's glucose at the last few visits has been in the high 100 range, postprandial.  · She has had a hemoglobin A1c that has been in the high 5-6 range in the past, on 5/1/2019 at 5.7  · Hemoglobin A1c on 5/7/2021 was improved at 5.2  21. Possible loosening of pedicle screw at T3:  · CT cervical spine 5/17/2021 showed loosening of the left pedicle screw at T3.  Patient referred to orthopedics and was seen by Dr. Nayak who felt that there were no concerning findings on the scan  22. COVID-19 vaccination  · Patient received the Pfizer vaccine, second dose administered on 4/2/2021 without side effects.  · Patient received her third dose Pfizer COVID-19 injection  in August 2021     Plan:  1. Continue to hold Xeloda x1 more week.    2. Patient will resume Xeloda at same dosing of 1000 mg a.m., 1500 mg in the p.m. 7 days on followed by 7 days off beginning 4/18/2022.  3. Continue Eliquis 5 mg twice daily, refill prescription sent today.  4. The patient will continue frequent use of emollient cream currently 5 times daily and add continue triamcinolone cream 0.1% twice daily (provided by dermatology) 2 weeks on followed by 2 weeks off as prescribed (resumed 1 to 2 days ago)  5. Continue omeprazole 20 mg twice daily  6. Continue Carafate 1 g 4 times daily   7. Continue ocular lubricating gel nightly per ophthalmology  8. Continue Provigil 100 mg daily and Cymbalta 30 mg twice daily per supportive oncology clinic.  Patient continues routine follow-up with supportive oncology.  9. Continue temazepam 15 mg nightly.  10. Patient continues on trimethoprim 100 mg daily that was prescribed by urogynecology for ongoing suppressive therapy for UTIs  11. Patient to undergo EGD/colonoscopy has been postponed until April 2022.  12. MD visit 5/2/2022 with CBC, CMP, magnesium, phosphorus and patient will be due for Xgeva.  We will reassess her degree of toxicity with Xeloda at this visit before she is due to begin her next cycle of Xeloda the same day.

## 2022-04-18 ENCOUNTER — DOCUMENTATION (OUTPATIENT)
Dept: PHARMACY | Facility: HOSPITAL | Age: 62
End: 2022-04-18

## 2022-04-18 NOTE — PROGRESS NOTES
Pt had just opened a bottle of 70 this morning when I talked to her this afternoon. According to her dosing, this will last 2 weeks.  This gets her thru until her next cycle which starts on 5/16/22.  I will call on 5/6/22 to schedule delivery for the following week.

## 2022-04-26 RX ORDER — GABAPENTIN 300 MG/1
CAPSULE ORAL
Qty: 180 CAPSULE | Refills: 3 | Status: SHIPPED | OUTPATIENT
Start: 2022-04-26 | End: 2022-04-29 | Stop reason: SDUPTHER

## 2022-04-29 RX ORDER — GABAPENTIN 300 MG/1
300 CAPSULE ORAL 2 TIMES DAILY
Qty: 180 CAPSULE | Refills: 3 | Status: SHIPPED | OUTPATIENT
Start: 2022-04-29 | End: 2022-10-10

## 2022-05-02 ENCOUNTER — APPOINTMENT (OUTPATIENT)
Dept: OTHER | Facility: HOSPITAL | Age: 62
End: 2022-05-02

## 2022-05-02 ENCOUNTER — APPOINTMENT (OUTPATIENT)
Dept: ONCOLOGY | Facility: HOSPITAL | Age: 62
End: 2022-05-02

## 2022-05-02 ENCOUNTER — INFUSION (OUTPATIENT)
Dept: ONCOLOGY | Facility: HOSPITAL | Age: 62
End: 2022-05-02

## 2022-05-02 ENCOUNTER — LAB (OUTPATIENT)
Dept: OTHER | Facility: HOSPITAL | Age: 62
End: 2022-05-02

## 2022-05-02 ENCOUNTER — SPECIALTY PHARMACY (OUTPATIENT)
Dept: ONCOLOGY | Facility: HOSPITAL | Age: 62
End: 2022-05-02

## 2022-05-02 ENCOUNTER — OFFICE VISIT (OUTPATIENT)
Dept: ONCOLOGY | Facility: CLINIC | Age: 62
End: 2022-05-02

## 2022-05-02 VITALS
RESPIRATION RATE: 16 BRPM | HEART RATE: 86 BPM | TEMPERATURE: 97.3 F | BODY MASS INDEX: 36.68 KG/M2 | WEIGHT: 194.3 LBS | HEIGHT: 61 IN | OXYGEN SATURATION: 97 %

## 2022-05-02 DIAGNOSIS — C50.811 MALIGNANT NEOPLASM OF OVERLAPPING SITES OF RIGHT BREAST IN FEMALE, ESTROGEN RECEPTOR NEGATIVE: Primary | ICD-10-CM

## 2022-05-02 DIAGNOSIS — Z17.1 MALIGNANT NEOPLASM OF OVERLAPPING SITES OF RIGHT BREAST IN FEMALE, ESTROGEN RECEPTOR NEGATIVE: Primary | ICD-10-CM

## 2022-05-02 DIAGNOSIS — Z17.1 MALIGNANT NEOPLASM OF OVERLAPPING SITES OF RIGHT BREAST IN FEMALE, ESTROGEN RECEPTOR NEGATIVE: ICD-10-CM

## 2022-05-02 DIAGNOSIS — C50.811 MALIGNANT NEOPLASM OF OVERLAPPING SITES OF RIGHT BREAST IN FEMALE, ESTROGEN RECEPTOR NEGATIVE: ICD-10-CM

## 2022-05-02 LAB
ALBUMIN SERPL-MCNC: 4.1 G/DL (ref 3.5–5.2)
ALBUMIN/GLOB SERPL: 2.1 G/DL
ALP SERPL-CCNC: 71 U/L (ref 39–117)
ALT SERPL W P-5'-P-CCNC: 15 U/L (ref 1–33)
ANION GAP SERPL CALCULATED.3IONS-SCNC: 7.6 MMOL/L (ref 5–15)
ANISOCYTOSIS BLD QL: NORMAL
AST SERPL-CCNC: 21 U/L (ref 1–32)
BASOPHILS # BLD AUTO: 0.03 10*3/MM3 (ref 0–0.2)
BASOPHILS NFR BLD AUTO: 0.7 % (ref 0–1.5)
BILIRUB SERPL-MCNC: 0.4 MG/DL (ref 0–1.2)
BUN SERPL-MCNC: 24 MG/DL (ref 8–23)
BUN/CREAT SERPL: 24.5 (ref 7–25)
CALCIUM SPEC-SCNC: 9 MG/DL (ref 8.6–10.5)
CHLORIDE SERPL-SCNC: 104 MMOL/L (ref 98–107)
CO2 SERPL-SCNC: 31.4 MMOL/L (ref 22–29)
CREAT SERPL-MCNC: 0.98 MG/DL (ref 0.57–1)
DEPRECATED RDW RBC AUTO: 60.7 FL (ref 37–54)
EGFRCR SERPLBLD CKD-EPI 2021: 65.8 ML/MIN/1.73
EOSINOPHIL # BLD AUTO: 0.19 10*3/MM3 (ref 0–0.4)
EOSINOPHIL NFR BLD AUTO: 4.5 % (ref 0.3–6.2)
ERYTHROCYTE [DISTWIDTH] IN BLOOD BY AUTOMATED COUNT: 17.1 % (ref 12.3–15.4)
FERRITIN SERPL-MCNC: 21.2 NG/ML (ref 13–150)
FOLATE SERPL-MCNC: >20 NG/ML (ref 4.78–24.2)
GLOBULIN UR ELPH-MCNC: 2 GM/DL
GLUCOSE SERPL-MCNC: 115 MG/DL (ref 65–99)
HCT VFR BLD AUTO: 34.5 % (ref 34–46.6)
HGB BLD-MCNC: 10.8 G/DL (ref 12–15.9)
IMM GRANULOCYTES # BLD AUTO: 0.01 10*3/MM3 (ref 0–0.05)
IMM GRANULOCYTES NFR BLD AUTO: 0.2 % (ref 0–0.5)
IRON 24H UR-MRATE: 48 MCG/DL (ref 37–145)
IRON SATN MFR SERPL: 11 % (ref 20–50)
LYMPHOCYTES # BLD AUTO: 1.41 10*3/MM3 (ref 0.7–3.1)
LYMPHOCYTES NFR BLD AUTO: 33.5 % (ref 19.6–45.3)
MACROCYTES BLD QL SMEAR: NORMAL
MAGNESIUM SERPL-MCNC: 1.9 MG/DL (ref 1.6–2.4)
MCH RBC QN AUTO: 31 PG (ref 26.6–33)
MCHC RBC AUTO-ENTMCNC: 31.3 G/DL (ref 31.5–35.7)
MCV RBC AUTO: 99.1 FL (ref 79–97)
MONOCYTES # BLD AUTO: 0.45 10*3/MM3 (ref 0.1–0.9)
MONOCYTES NFR BLD AUTO: 10.7 % (ref 5–12)
NEUTROPHILS NFR BLD AUTO: 2.12 10*3/MM3 (ref 1.7–7)
NEUTROPHILS NFR BLD AUTO: 50.4 % (ref 42.7–76)
NRBC BLD AUTO-RTO: 0 /100 WBC (ref 0–0.2)
PHOSPHATE SERPL-MCNC: 3.9 MG/DL (ref 2.5–4.5)
PLAT MORPH BLD: NORMAL
PLATELET # BLD AUTO: 276 10*3/MM3 (ref 140–450)
PMV BLD AUTO: 10.4 FL (ref 6–12)
POTASSIUM SERPL-SCNC: 4.7 MMOL/L (ref 3.5–5.2)
PROT SERPL-MCNC: 6.1 G/DL (ref 6–8.5)
RBC # BLD AUTO: 3.48 10*6/MM3 (ref 3.77–5.28)
SODIUM SERPL-SCNC: 143 MMOL/L (ref 136–145)
TIBC SERPL-MCNC: 432 MCG/DL (ref 298–536)
TRANSFERRIN SERPL-MCNC: 290 MG/DL (ref 200–360)
VIT B12 BLD-MCNC: >2000 PG/ML (ref 211–946)
WBC MORPH BLD: NORMAL
WBC NRBC COR # BLD: 4.21 10*3/MM3 (ref 3.4–10.8)

## 2022-05-02 PROCEDURE — 36415 COLL VENOUS BLD VENIPUNCTURE: CPT

## 2022-05-02 PROCEDURE — 84100 ASSAY OF PHOSPHORUS: CPT | Performed by: INTERNAL MEDICINE

## 2022-05-02 PROCEDURE — 83735 ASSAY OF MAGNESIUM: CPT | Performed by: INTERNAL MEDICINE

## 2022-05-02 PROCEDURE — 80053 COMPREHEN METABOLIC PANEL: CPT | Performed by: INTERNAL MEDICINE

## 2022-05-02 PROCEDURE — 84466 ASSAY OF TRANSFERRIN: CPT | Performed by: INTERNAL MEDICINE

## 2022-05-02 PROCEDURE — 83540 ASSAY OF IRON: CPT | Performed by: INTERNAL MEDICINE

## 2022-05-02 PROCEDURE — 82607 VITAMIN B-12: CPT | Performed by: INTERNAL MEDICINE

## 2022-05-02 PROCEDURE — 85007 BL SMEAR W/DIFF WBC COUNT: CPT | Performed by: INTERNAL MEDICINE

## 2022-05-02 PROCEDURE — 96372 THER/PROPH/DIAG INJ SC/IM: CPT

## 2022-05-02 PROCEDURE — 82728 ASSAY OF FERRITIN: CPT | Performed by: INTERNAL MEDICINE

## 2022-05-02 PROCEDURE — 82746 ASSAY OF FOLIC ACID SERUM: CPT | Performed by: INTERNAL MEDICINE

## 2022-05-02 PROCEDURE — 85025 COMPLETE CBC W/AUTO DIFF WBC: CPT | Performed by: INTERNAL MEDICINE

## 2022-05-02 PROCEDURE — 99214 OFFICE O/P EST MOD 30 MIN: CPT | Performed by: INTERNAL MEDICINE

## 2022-05-02 PROCEDURE — 25010000002 DENOSUMAB 120 MG/1.7ML SOLUTION: Performed by: INTERNAL MEDICINE

## 2022-05-02 RX ORDER — MAGNESIUM CITRATE 1.75 G/29.6ML
LIQUID ORAL
COMMUNITY
Start: 2022-04-19 | End: 2022-06-06

## 2022-05-02 RX ADMIN — DENOSUMAB 120 MG: 120 INJECTION SUBCUTANEOUS at 08:52

## 2022-05-03 ENCOUNTER — TELEPHONE (OUTPATIENT)
Dept: ONCOLOGY | Facility: CLINIC | Age: 62
End: 2022-05-03

## 2022-05-03 NOTE — TELEPHONE ENCOUNTER
Phoned patient to inform her that her iron labs drawn yesterday show evidence of iron deficiency. Per Dr. Hancock, advised patient to begin taking iron sulfate 325 mg OTC once daily. Informed patient about possible GI side effects of oral iron, including upset stomach and constipation, and encouraged her to notify us if she is unable to tolerate, as we would then investigate pursuing IV iron replacement. Patient v/u.

## 2022-05-06 ENCOUNTER — TELEPHONE (OUTPATIENT)
Dept: ONCOLOGY | Facility: OTHER | Age: 62
End: 2022-05-06

## 2022-05-06 ENCOUNTER — SPECIALTY PHARMACY (OUTPATIENT)
Dept: PHARMACY | Facility: HOSPITAL | Age: 62
End: 2022-05-06

## 2022-05-06 RX ORDER — CAPECITABINE 500 MG/1
TABLET, FILM COATED ORAL
Qty: 70 TABLET | Refills: 5 | Status: SHIPPED | OUTPATIENT
Start: 2022-05-06 | End: 2022-12-02 | Stop reason: SDUPTHER

## 2022-05-06 NOTE — TELEPHONE ENCOUNTER
Refill requested from pharmacy. Per last chart note, patient to continue Xeloda 1000 mg a.m., 1500 mg in the p.m. 7 days on followed by 7 days off. Will route to MD for cosignature.

## 2022-05-06 NOTE — PROGRESS NOTES
Specialty Pharmacy Refill Coordination Note     Martha is a 61 y.o. female contacted today regarding refills of  Xeloda specialty medication(s).    Reviewed and verified with patient:         Specialty medication(s) and dose(s) confirmed: yes    Refill Questions    Flowsheet Row Most Recent Value   Changes to allergies? No   Changes to medications? No   New conditions since last clinic visit No   Unplanned office visit, urgent care, ED, or hospital admission in the last 4 weeks  No   How does patient/caregiver feel medication is working? Good   Financial problems or insurance changes  No   How many doses of your specialty medications were missed in the last 4 weeks? 0   Does this patient require a clinical escalation to a pharmacist? No          Delivery Questions    Flowsheet Row Most Recent Value   Delivery method [employer]  [[employer] priority sig required- ring doorbell- ship 5/12 deliver 5/13]   Delivery address correct? Yes   Preferred delivery time? Anytime   Number of medications in delivery 1   Medication being filled and delivered Xeloda   Doses left of specialty medications 0- off week   Questions or concerns for the pharmacist? No                 Follow-up: 3 week(s)     Ami Hudson  Specialty Pharmacy Technician

## 2022-05-10 RX ORDER — TEMAZEPAM 15 MG/1
15 CAPSULE ORAL NIGHTLY PRN
Qty: 90 CAPSULE | Refills: 1 | Status: SHIPPED | OUTPATIENT
Start: 2022-05-10 | End: 2023-01-12

## 2022-05-10 RX ORDER — TRAMADOL HYDROCHLORIDE 50 MG/1
TABLET ORAL
Qty: 90 TABLET | Refills: 1 | Status: SHIPPED | OUTPATIENT
Start: 2022-05-10 | End: 2022-07-25

## 2022-05-10 RX ORDER — DULOXETIN HYDROCHLORIDE 30 MG/1
CAPSULE, DELAYED RELEASE ORAL
Qty: 180 CAPSULE | Refills: 0 | Status: SHIPPED | OUTPATIENT
Start: 2022-05-10 | End: 2022-08-05 | Stop reason: SDUPTHER

## 2022-05-12 DIAGNOSIS — I10 HYPERTENSION, UNSPECIFIED TYPE: ICD-10-CM

## 2022-05-12 DIAGNOSIS — E78.5 HYPERLIPIDEMIA, UNSPECIFIED HYPERLIPIDEMIA TYPE: Primary | ICD-10-CM

## 2022-05-17 ENCOUNTER — TELEPHONE (OUTPATIENT)
Dept: ONCOLOGY | Facility: CLINIC | Age: 62
End: 2022-05-17

## 2022-05-17 RX ORDER — ONDANSETRON 8 MG/1
8 TABLET, ORALLY DISINTEGRATING ORAL EVERY 8 HOURS PRN
Qty: 30 TABLET | Refills: 1 | Status: ON HOLD | OUTPATIENT
Start: 2022-05-17 | End: 2022-06-01 | Stop reason: SDUPTHER

## 2022-05-17 NOTE — TELEPHONE ENCOUNTER
Pt called back, states her entire family has had a stomach bug they last few days and she has not been able to keep down her xeloda yesterday. States her nausea is improving but requesting a refill on zofran ODT to help her keep the xeloda down today. Refill sent with MD to cosign. Pt advised to call back if nausea / vomiting continues / worsens and/or if she cannot keep her medicine down. Patient expressed understanding and had no additional questions at this time.

## 2022-05-17 NOTE — TELEPHONE ENCOUNTER
Called patient, no answer. Left voicemail to return my call at direct line [phone].    Elizabeth Schafer Pharmacist  5/17/2022  10:45 EDT

## 2022-05-17 NOTE — TELEPHONE ENCOUNTER
Caller: Martha Davey    Relationship: Self    Best call back number: [phone]    Who are you requesting to speak with (clinical staff, provider,  specific staff member): CLINICAL    What was the call regarding: MARTHA IS CALLING STATES SHE COULD NOT GET HER CHEMO PILL DOWN YESTERDAY.  SHE IS WANTING TO TALK TO  SOMEONE REGARDING THIS.    Do you require a callback: YES

## 2022-05-19 ENCOUNTER — DOCUMENTATION (OUTPATIENT)
Dept: PHARMACY | Facility: HOSPITAL | Age: 62
End: 2022-05-19

## 2022-05-26 LAB
ALBUMIN SERPL-MCNC: 4.2 G/DL (ref 3.8–4.8)
ALBUMIN/GLOB SERPL: 2.1 {RATIO} (ref 1.2–2.2)
ALP SERPL-CCNC: 60 IU/L (ref 44–121)
ALT SERPL-CCNC: 21 IU/L (ref 0–32)
APPEARANCE UR: CLEAR
AST SERPL-CCNC: 26 IU/L (ref 0–40)
BACTERIA #/AREA URNS HPF: ABNORMAL /[HPF]
BACTERIA UR CULT: ABNORMAL
BACTERIA UR CULT: ABNORMAL
BASOPHILS # BLD AUTO: 0.1 X10E3/UL (ref 0–0.2)
BASOPHILS NFR BLD AUTO: 1 %
BILIRUB SERPL-MCNC: 0.5 MG/DL (ref 0–1.2)
BILIRUB UR QL STRIP: NEGATIVE
BUN SERPL-MCNC: 21 MG/DL (ref 8–27)
BUN/CREAT SERPL: 25 (ref 12–28)
CALCIUM SERPL-MCNC: 9.2 MG/DL (ref 8.7–10.3)
CASTS URNS QL MICRO: ABNORMAL /LPF
CHLORIDE SERPL-SCNC: 103 MMOL/L (ref 96–106)
CHOLEST SERPL-MCNC: 168 MG/DL (ref 100–199)
CO2 SERPL-SCNC: 26 MMOL/L (ref 20–29)
COLOR UR: YELLOW
CREAT SERPL-MCNC: 0.83 MG/DL (ref 0.57–1)
CRYSTALS URNS MICRO: ABNORMAL
EGFRCR SERPLBLD CKD-EPI 2021: 80 ML/MIN/1.73
EOSINOPHIL # BLD AUTO: 0.2 X10E3/UL (ref 0–0.4)
EOSINOPHIL NFR BLD AUTO: 2 %
EPI CELLS #/AREA URNS HPF: ABNORMAL /HPF (ref 0–10)
ERYTHROCYTE [DISTWIDTH] IN BLOOD BY AUTOMATED COUNT: 16.5 % (ref 11.7–15.4)
GLOBULIN SER CALC-MCNC: 2 G/DL (ref 1.5–4.5)
GLUCOSE SERPL-MCNC: 101 MG/DL (ref 65–99)
GLUCOSE UR QL STRIP: NEGATIVE
HCT VFR BLD AUTO: 36.5 % (ref 34–46.6)
HDLC SERPL-MCNC: 54 MG/DL
HGB BLD-MCNC: 12.3 G/DL (ref 11.1–15.9)
HGB UR QL STRIP: ABNORMAL
IMM GRANULOCYTES # BLD AUTO: 0 X10E3/UL (ref 0–0.1)
IMM GRANULOCYTES NFR BLD AUTO: 0 %
KETONES UR QL STRIP: NEGATIVE
LDLC SERPL CALC-MCNC: 87 MG/DL (ref 0–99)
LEUKOCYTE ESTERASE UR QL STRIP: ABNORMAL
LYMPHOCYTES # BLD AUTO: 0.9 X10E3/UL (ref 0.7–3.1)
LYMPHOCYTES NFR BLD AUTO: 10 %
MCH RBC QN AUTO: 30.8 PG (ref 26.6–33)
MCHC RBC AUTO-ENTMCNC: 33.7 G/DL (ref 31.5–35.7)
MCV RBC AUTO: 92 FL (ref 79–97)
MICRO URNS: ABNORMAL
MONOCYTES # BLD AUTO: 0.6 X10E3/UL (ref 0.1–0.9)
MONOCYTES NFR BLD AUTO: 7 %
NEUTROPHILS # BLD AUTO: 7.4 X10E3/UL (ref 1.4–7)
NEUTROPHILS NFR BLD AUTO: 80 %
NITRITE UR QL STRIP: NEGATIVE
OTHER ANTIBIOTIC SUSC ISLT: ABNORMAL
PH UR STRIP: 5.5 [PH] (ref 5–7.5)
PLATELET # BLD AUTO: 249 X10E3/UL (ref 150–450)
POTASSIUM SERPL-SCNC: 4.3 MMOL/L (ref 3.5–5.2)
PROT SERPL-MCNC: 6.2 G/DL (ref 6–8.5)
PROT UR QL STRIP: ABNORMAL
RBC # BLD AUTO: 3.99 X10E6/UL (ref 3.77–5.28)
RBC #/AREA URNS HPF: >30 /HPF (ref 0–2)
SODIUM SERPL-SCNC: 146 MMOL/L (ref 134–144)
SP GR UR STRIP: 1.02 (ref 1–1.03)
TRIGL SERPL-MCNC: 159 MG/DL (ref 0–149)
TSH SERPL DL<=0.005 MIU/L-ACNC: 1.12 UIU/ML (ref 0.45–4.5)
UNIDENT CRYS URNS QL MICRO: PRESENT
URINALYSIS REFLEX: ABNORMAL
UROBILINOGEN UR STRIP-MCNC: 1 MG/DL (ref 0.2–1)
VLDLC SERPL CALC-MCNC: 27 MG/DL (ref 5–40)
WBC # BLD AUTO: 9.2 X10E3/UL (ref 3.4–10.8)
WBC #/AREA URNS HPF: ABNORMAL /HPF (ref 0–5)

## 2022-05-29 ENCOUNTER — HOSPITAL ENCOUNTER (INPATIENT)
Facility: HOSPITAL | Age: 62
LOS: 2 days | Discharge: HOME | End: 2022-06-01
Attending: EMERGENCY MEDICINE | Admitting: HOSPITALIST

## 2022-05-29 DIAGNOSIS — R11.0 NAUSEA: ICD-10-CM

## 2022-05-29 DIAGNOSIS — C50.919 METASTATIC BREAST CANCER: ICD-10-CM

## 2022-05-29 DIAGNOSIS — K21.9 GERD (GASTROESOPHAGEAL REFLUX DISEASE): ICD-10-CM

## 2022-05-29 DIAGNOSIS — R11.10 INTRACTABLE VOMITING: Primary | ICD-10-CM

## 2022-05-29 PROBLEM — R11.2 NAUSEA & VOMITING: Status: ACTIVE | Noted: 2022-05-29

## 2022-05-29 LAB
ALBUMIN SERPL-MCNC: 3.8 G/DL (ref 3.5–5.2)
ALBUMIN/GLOB SERPL: 1.8 G/DL
ALP SERPL-CCNC: 52 U/L (ref 39–117)
ALT SERPL W P-5'-P-CCNC: 22 U/L (ref 1–33)
ANION GAP SERPL CALCULATED.3IONS-SCNC: 11 MMOL/L (ref 5–15)
AST SERPL-CCNC: 23 U/L (ref 1–32)
BACTERIA UR QL AUTO: ABNORMAL /HPF
BASOPHILS # BLD AUTO: 0.03 10*3/MM3 (ref 0–0.2)
BASOPHILS NFR BLD AUTO: 0.5 % (ref 0–1.5)
BILIRUB SERPL-MCNC: 0.6 MG/DL (ref 0–1.2)
BILIRUB UR QL STRIP: NEGATIVE
BUN SERPL-MCNC: 14 MG/DL (ref 8–23)
BUN/CREAT SERPL: 16.7 (ref 7–25)
CALCIUM SPEC-SCNC: 8.2 MG/DL (ref 8.6–10.5)
CHLORIDE SERPL-SCNC: 104 MMOL/L (ref 98–107)
CLARITY UR: CLEAR
CO2 SERPL-SCNC: 25 MMOL/L (ref 22–29)
COLOR UR: YELLOW
CREAT SERPL-MCNC: 0.84 MG/DL (ref 0.57–1)
DEPRECATED RDW RBC AUTO: 54.2 FL (ref 37–54)
EGFRCR SERPLBLD CKD-EPI 2021: 79.2 ML/MIN/1.73
EOSINOPHIL # BLD AUTO: 0.09 10*3/MM3 (ref 0–0.4)
EOSINOPHIL NFR BLD AUTO: 1.6 % (ref 0.3–6.2)
ERYTHROCYTE [DISTWIDTH] IN BLOOD BY AUTOMATED COUNT: 17.7 % (ref 12.3–15.4)
GLOBULIN UR ELPH-MCNC: 2.1 GM/DL
GLUCOSE SERPL-MCNC: 105 MG/DL (ref 65–99)
GLUCOSE UR STRIP-MCNC: NEGATIVE MG/DL
HCT VFR BLD AUTO: 35.4 % (ref 34–46.6)
HGB BLD-MCNC: 12.2 G/DL (ref 12–15.9)
HGB UR QL STRIP.AUTO: ABNORMAL
HYALINE CASTS UR QL AUTO: ABNORMAL /LPF
IMM GRANULOCYTES # BLD AUTO: 0.03 10*3/MM3 (ref 0–0.05)
IMM GRANULOCYTES NFR BLD AUTO: 0.5 % (ref 0–0.5)
KETONES UR QL STRIP: ABNORMAL
LEUKOCYTE ESTERASE UR QL STRIP.AUTO: NEGATIVE
LYMPHOCYTES # BLD AUTO: 0.89 10*3/MM3 (ref 0.7–3.1)
LYMPHOCYTES NFR BLD AUTO: 16.3 % (ref 19.6–45.3)
MCH RBC QN AUTO: 31 PG (ref 26.6–33)
MCHC RBC AUTO-ENTMCNC: 34.5 G/DL (ref 31.5–35.7)
MCV RBC AUTO: 90.1 FL (ref 79–97)
MONOCYTES # BLD AUTO: 0.49 10*3/MM3 (ref 0.1–0.9)
MONOCYTES NFR BLD AUTO: 9 % (ref 5–12)
NEUTROPHILS NFR BLD AUTO: 3.94 10*3/MM3 (ref 1.7–7)
NEUTROPHILS NFR BLD AUTO: 72.1 % (ref 42.7–76)
NITRITE UR QL STRIP: NEGATIVE
NRBC BLD AUTO-RTO: 0.2 /100 WBC (ref 0–0.2)
PH UR STRIP.AUTO: 6.5 [PH] (ref 5–8)
PLATELET # BLD AUTO: 283 10*3/MM3 (ref 140–450)
PMV BLD AUTO: 10.5 FL (ref 6–12)
POTASSIUM SERPL-SCNC: 3.8 MMOL/L (ref 3.5–5.2)
PROT SERPL-MCNC: 5.9 G/DL (ref 6–8.5)
PROT UR QL STRIP: NEGATIVE
RBC # BLD AUTO: 3.93 10*6/MM3 (ref 3.77–5.28)
RBC # UR STRIP: ABNORMAL /HPF
REF LAB TEST METHOD: ABNORMAL
SARS-COV-2 RNA RESP QL NAA+PROBE: NOT DETECTED
SODIUM SERPL-SCNC: 140 MMOL/L (ref 136–145)
SP GR UR STRIP: 1.01 (ref 1–1.03)
SQUAMOUS #/AREA URNS HPF: ABNORMAL /HPF
UROBILINOGEN UR QL STRIP: ABNORMAL
WBC # UR STRIP: ABNORMAL /HPF
WBC NRBC COR # BLD: 5.47 10*3/MM3 (ref 3.4–10.8)

## 2022-05-29 PROCEDURE — 25010000002 HYDROMORPHONE 1 MG/ML SOLUTION: Performed by: EMERGENCY MEDICINE

## 2022-05-29 PROCEDURE — G0378 HOSPITAL OBSERVATION PER HR: HCPCS

## 2022-05-29 PROCEDURE — 85025 COMPLETE CBC W/AUTO DIFF WBC: CPT | Performed by: EMERGENCY MEDICINE

## 2022-05-29 PROCEDURE — 25010000002 PROMETHAZINE PER 50 MG

## 2022-05-29 PROCEDURE — 99285 EMERGENCY DEPT VISIT HI MDM: CPT

## 2022-05-29 PROCEDURE — 25010000002 METOCLOPRAMIDE PER 10 MG: Performed by: EMERGENCY MEDICINE

## 2022-05-29 PROCEDURE — U0005 INFEC AGEN DETEC AMPLI PROBE: HCPCS | Performed by: EMERGENCY MEDICINE

## 2022-05-29 PROCEDURE — 80053 COMPREHEN METABOLIC PANEL: CPT | Performed by: EMERGENCY MEDICINE

## 2022-05-29 PROCEDURE — 25010000002 ONDANSETRON PER 1 MG: Performed by: EMERGENCY MEDICINE

## 2022-05-29 PROCEDURE — 81001 URINALYSIS AUTO W/SCOPE: CPT | Performed by: EMERGENCY MEDICINE

## 2022-05-29 PROCEDURE — U0003 INFECTIOUS AGENT DETECTION BY NUCLEIC ACID (DNA OR RNA); SEVERE ACUTE RESPIRATORY SYNDROME CORONAVIRUS 2 (SARS-COV-2) (CORONAVIRUS DISEASE [COVID-19]), AMPLIFIED PROBE TECHNIQUE, MAKING USE OF HIGH THROUGHPUT TECHNOLOGIES AS DESCRIBED BY CMS-2020-01-R: HCPCS | Performed by: EMERGENCY MEDICINE

## 2022-05-29 PROCEDURE — 63710000001 PROCHLORPERAZINE MALEATE PER 10 MG

## 2022-05-29 RX ORDER — ONDANSETRON 4 MG/1
4 TABLET, FILM COATED ORAL EVERY 6 HOURS PRN
Status: DISCONTINUED | OUTPATIENT
Start: 2022-05-29 | End: 2022-06-01 | Stop reason: HOSPADM

## 2022-05-29 RX ORDER — PROCHLORPERAZINE MALEATE 10 MG
10 TABLET ORAL EVERY 6 HOURS PRN
Status: DISCONTINUED | OUTPATIENT
Start: 2022-05-29 | End: 2022-06-01 | Stop reason: HOSPADM

## 2022-05-29 RX ORDER — SODIUM CHLORIDE 9 MG/ML
125 INJECTION, SOLUTION INTRAVENOUS CONTINUOUS
Status: DISCONTINUED | OUTPATIENT
Start: 2022-05-29 | End: 2022-05-31

## 2022-05-29 RX ORDER — LOSARTAN POTASSIUM 50 MG/1
50 TABLET ORAL DAILY
Status: DISCONTINUED | OUTPATIENT
Start: 2022-05-29 | End: 2022-06-01 | Stop reason: HOSPADM

## 2022-05-29 RX ORDER — ACETAMINOPHEN 500 MG
500 TABLET ORAL EVERY 6 HOURS PRN
Status: DISCONTINUED | OUTPATIENT
Start: 2022-05-29 | End: 2022-06-01 | Stop reason: HOSPADM

## 2022-05-29 RX ORDER — ONDANSETRON 2 MG/ML
4 INJECTION INTRAMUSCULAR; INTRAVENOUS ONCE
Status: COMPLETED | OUTPATIENT
Start: 2022-05-29 | End: 2022-05-29

## 2022-05-29 RX ORDER — SODIUM CHLORIDE 0.9 % (FLUSH) 0.9 %
10 SYRINGE (ML) INJECTION AS NEEDED
Status: DISCONTINUED | OUTPATIENT
Start: 2022-05-29 | End: 2022-06-01 | Stop reason: HOSPADM

## 2022-05-29 RX ORDER — ONDANSETRON 2 MG/ML
4 INJECTION INTRAMUSCULAR; INTRAVENOUS EVERY 6 HOURS PRN
Status: DISCONTINUED | OUTPATIENT
Start: 2022-05-29 | End: 2022-06-01 | Stop reason: HOSPADM

## 2022-05-29 RX ORDER — NITROGLYCERIN 0.4 MG/1
0.4 TABLET SUBLINGUAL
Status: DISCONTINUED | OUTPATIENT
Start: 2022-05-29 | End: 2022-06-01 | Stop reason: HOSPADM

## 2022-05-29 RX ORDER — PANTOPRAZOLE SODIUM 40 MG/10ML
40 INJECTION, POWDER, LYOPHILIZED, FOR SOLUTION INTRAVENOUS
Status: DISCONTINUED | OUTPATIENT
Start: 2022-05-30 | End: 2022-06-01 | Stop reason: HOSPADM

## 2022-05-29 RX ORDER — ACETAMINOPHEN 325 MG/1
650 TABLET ORAL EVERY 4 HOURS PRN
Status: DISCONTINUED | OUTPATIENT
Start: 2022-05-29 | End: 2022-06-01 | Stop reason: HOSPADM

## 2022-05-29 RX ORDER — METOCLOPRAMIDE HYDROCHLORIDE 5 MG/ML
10 INJECTION INTRAMUSCULAR; INTRAVENOUS ONCE
Status: COMPLETED | OUTPATIENT
Start: 2022-05-29 | End: 2022-05-29

## 2022-05-29 RX ORDER — ONDANSETRON 8 MG/1
8 TABLET, ORALLY DISINTEGRATING ORAL EVERY 8 HOURS PRN
Status: DISCONTINUED | OUTPATIENT
Start: 2022-05-29 | End: 2022-06-01 | Stop reason: HOSPADM

## 2022-05-29 RX ORDER — DULOXETIN HYDROCHLORIDE 30 MG/1
30 CAPSULE, DELAYED RELEASE ORAL 2 TIMES DAILY
Status: DISCONTINUED | OUTPATIENT
Start: 2022-05-29 | End: 2022-06-01 | Stop reason: HOSPADM

## 2022-05-29 RX ORDER — SODIUM CHLORIDE 0.9 % (FLUSH) 0.9 %
10 SYRINGE (ML) INJECTION EVERY 12 HOURS SCHEDULED
Status: DISCONTINUED | OUTPATIENT
Start: 2022-05-29 | End: 2022-06-01 | Stop reason: HOSPADM

## 2022-05-29 RX ADMIN — PROMETHAZINE HYDROCHLORIDE 12.5 MG: 25 INJECTION INTRAMUSCULAR; INTRAVENOUS at 17:25

## 2022-05-29 RX ADMIN — APIXABAN 5 MG: 5 TABLET, FILM COATED ORAL at 23:17

## 2022-05-29 RX ADMIN — PROCHLORPERAZINE MALEATE 10 MG: 10 TABLET ORAL at 23:17

## 2022-05-29 RX ADMIN — SODIUM CHLORIDE 125 ML/HR: 9 INJECTION, SOLUTION INTRAVENOUS at 18:35

## 2022-05-29 RX ADMIN — ONDANSETRON 4 MG: 2 INJECTION INTRAMUSCULAR; INTRAVENOUS at 12:41

## 2022-05-29 RX ADMIN — HYDROMORPHONE HYDROCHLORIDE 1 MG: 1 INJECTION, SOLUTION INTRAMUSCULAR; INTRAVENOUS; SUBCUTANEOUS at 13:54

## 2022-05-29 RX ADMIN — METOCLOPRAMIDE HYDROCHLORIDE 10 MG: 5 INJECTION INTRAMUSCULAR; INTRAVENOUS at 16:04

## 2022-05-29 RX ADMIN — SODIUM CHLORIDE, POTASSIUM CHLORIDE, SODIUM LACTATE AND CALCIUM CHLORIDE 1000 ML: 600; 310; 30; 20 INJECTION, SOLUTION INTRAVENOUS at 12:41

## 2022-05-29 RX ADMIN — DULOXETINE HYDROCHLORIDE 30 MG: 30 CAPSULE, DELAYED RELEASE ORAL at 23:17

## 2022-05-30 ENCOUNTER — APPOINTMENT (OUTPATIENT)
Dept: CT IMAGING | Facility: HOSPITAL | Age: 62
End: 2022-05-30

## 2022-05-30 ENCOUNTER — PREP FOR SURGERY (OUTPATIENT)
Dept: SURGERY | Facility: SURGERY CENTER | Age: 62
End: 2022-05-30

## 2022-05-30 ENCOUNTER — ANESTHESIA EVENT (OUTPATIENT)
Dept: GASTROENTEROLOGY | Facility: HOSPITAL | Age: 62
End: 2022-05-30

## 2022-05-30 DIAGNOSIS — K21.9 GERD (GASTROESOPHAGEAL REFLUX DISEASE): Primary | ICD-10-CM

## 2022-05-30 DIAGNOSIS — R11.0 NAUSEA: ICD-10-CM

## 2022-05-30 PROBLEM — C50.919 METASTATIC BREAST CANCER: Status: ACTIVE | Noted: 2022-05-30

## 2022-05-30 PROBLEM — R11.10 INTRACTABLE VOMITING: Status: ACTIVE | Noted: 2022-05-30

## 2022-05-30 LAB
ANION GAP SERPL CALCULATED.3IONS-SCNC: 11 MMOL/L (ref 5–15)
BUN SERPL-MCNC: 12 MG/DL (ref 8–23)
BUN/CREAT SERPL: 16.7 (ref 7–25)
CALCIUM SPEC-SCNC: 7.2 MG/DL (ref 8.6–10.5)
CHLORIDE SERPL-SCNC: 106 MMOL/L (ref 98–107)
CO2 SERPL-SCNC: 22 MMOL/L (ref 22–29)
CREAT SERPL-MCNC: 0.72 MG/DL (ref 0.57–1)
DEPRECATED RDW RBC AUTO: 58.2 FL (ref 37–54)
EGFRCR SERPLBLD CKD-EPI 2021: 95.3 ML/MIN/1.73
ERYTHROCYTE [DISTWIDTH] IN BLOOD BY AUTOMATED COUNT: 17.7 % (ref 12.3–15.4)
GLUCOSE SERPL-MCNC: 87 MG/DL (ref 65–99)
HCT VFR BLD AUTO: 34.4 % (ref 34–46.6)
HGB BLD-MCNC: 11.3 G/DL (ref 12–15.9)
MCH RBC QN AUTO: 31.3 PG (ref 26.6–33)
MCHC RBC AUTO-ENTMCNC: 32.8 G/DL (ref 31.5–35.7)
MCV RBC AUTO: 95.3 FL (ref 79–97)
PLATELET # BLD AUTO: 247 10*3/MM3 (ref 140–450)
PMV BLD AUTO: 10 FL (ref 6–12)
POTASSIUM SERPL-SCNC: 3.2 MMOL/L (ref 3.5–5.2)
POTASSIUM SERPL-SCNC: 4.4 MMOL/L (ref 3.5–5.2)
RBC # BLD AUTO: 3.61 10*6/MM3 (ref 3.77–5.28)
SODIUM SERPL-SCNC: 139 MMOL/L (ref 136–145)
WBC NRBC COR # BLD: 4.77 10*3/MM3 (ref 3.4–10.8)

## 2022-05-30 PROCEDURE — 84132 ASSAY OF SERUM POTASSIUM: CPT | Performed by: HOSPITALIST

## 2022-05-30 PROCEDURE — 85027 COMPLETE CBC AUTOMATED: CPT | Performed by: NURSE PRACTITIONER

## 2022-05-30 PROCEDURE — 99222 1ST HOSP IP/OBS MODERATE 55: CPT | Performed by: INTERNAL MEDICINE

## 2022-05-30 PROCEDURE — 80048 BASIC METABOLIC PNL TOTAL CA: CPT | Performed by: NURSE PRACTITIONER

## 2022-05-30 PROCEDURE — 71260 CT THORAX DX C+: CPT

## 2022-05-30 PROCEDURE — 99223 1ST HOSP IP/OBS HIGH 75: CPT | Performed by: INTERNAL MEDICINE

## 2022-05-30 PROCEDURE — 63710000001 ONDANSETRON ODT 4 MG TABLET DISPERSIBLE

## 2022-05-30 PROCEDURE — 25010000002 IOPAMIDOL 61 % SOLUTION: Performed by: EMERGENCY MEDICINE

## 2022-05-30 PROCEDURE — 74177 CT ABD & PELVIS W/CONTRAST: CPT

## 2022-05-30 RX ORDER — PETROLATUM 420 MG/G
1 OINTMENT TOPICAL EVERY 8 HOURS SCHEDULED
Status: DISCONTINUED | OUTPATIENT
Start: 2022-05-30 | End: 2022-06-01 | Stop reason: HOSPADM

## 2022-05-30 RX ORDER — GABAPENTIN 300 MG/1
300 CAPSULE ORAL 2 TIMES DAILY
Status: DISCONTINUED | OUTPATIENT
Start: 2022-05-30 | End: 2022-06-01 | Stop reason: HOSPADM

## 2022-05-30 RX ORDER — POTASSIUM CHLORIDE 750 MG/1
40 TABLET, FILM COATED, EXTENDED RELEASE ORAL AS NEEDED
Status: DISCONTINUED | OUTPATIENT
Start: 2022-05-30 | End: 2022-06-01 | Stop reason: HOSPADM

## 2022-05-30 RX ORDER — POTASSIUM CHLORIDE 1.5 G/1.77G
40 POWDER, FOR SOLUTION ORAL AS NEEDED
Status: DISCONTINUED | OUTPATIENT
Start: 2022-05-30 | End: 2022-06-01 | Stop reason: HOSPADM

## 2022-05-30 RX ORDER — TEMAZEPAM 15 MG/1
15 CAPSULE ORAL NIGHTLY PRN
Status: DISCONTINUED | OUTPATIENT
Start: 2022-05-30 | End: 2022-06-01 | Stop reason: HOSPADM

## 2022-05-30 RX ORDER — POTASSIUM CHLORIDE 7.45 MG/ML
10 INJECTION INTRAVENOUS
Status: DISCONTINUED | OUTPATIENT
Start: 2022-05-30 | End: 2022-06-01 | Stop reason: HOSPADM

## 2022-05-30 RX ADMIN — POTASSIUM CHLORIDE 40 MEQ: 750 TABLET, EXTENDED RELEASE ORAL at 06:44

## 2022-05-30 RX ADMIN — ONDANSETRON 8 MG: 4 TABLET, ORALLY DISINTEGRATING ORAL at 08:07

## 2022-05-30 RX ADMIN — PANTOPRAZOLE SODIUM 40 MG: 40 INJECTION, POWDER, FOR SOLUTION INTRAVENOUS at 06:18

## 2022-05-30 RX ADMIN — DULOXETINE HYDROCHLORIDE 30 MG: 30 CAPSULE, DELAYED RELEASE ORAL at 20:30

## 2022-05-30 RX ADMIN — SODIUM CHLORIDE 125 ML/HR: 9 INJECTION, SOLUTION INTRAVENOUS at 22:26

## 2022-05-30 RX ADMIN — POTASSIUM CHLORIDE 40 MEQ: 750 TABLET, EXTENDED RELEASE ORAL at 14:39

## 2022-05-30 RX ADMIN — Medication 10 ML: at 00:18

## 2022-05-30 RX ADMIN — Medication 10 ML: at 20:30

## 2022-05-30 RX ADMIN — APIXABAN 5 MG: 5 TABLET, FILM COATED ORAL at 08:07

## 2022-05-30 RX ADMIN — IOPAMIDOL 85 ML: 612 INJECTION, SOLUTION INTRAVENOUS at 03:01

## 2022-05-30 RX ADMIN — Medication 10 ML: at 08:07

## 2022-05-30 RX ADMIN — TEMAZEPAM 15 MG: 15 CAPSULE ORAL at 22:21

## 2022-05-30 RX ADMIN — PETROLATUM 1 APPLICATION: 420 OINTMENT TOPICAL at 14:29

## 2022-05-30 RX ADMIN — LOSARTAN POTASSIUM 50 MG: 50 TABLET, FILM COATED ORAL at 08:07

## 2022-05-30 RX ADMIN — SODIUM CHLORIDE 125 ML/HR: 9 INJECTION, SOLUTION INTRAVENOUS at 14:29

## 2022-05-30 RX ADMIN — GABAPENTIN 300 MG: 300 CAPSULE ORAL at 22:21

## 2022-05-30 RX ADMIN — DULOXETINE HYDROCHLORIDE 30 MG: 30 CAPSULE, DELAYED RELEASE ORAL at 08:09

## 2022-05-30 RX ADMIN — PETROLATUM 1 APPLICATION: 420 OINTMENT TOPICAL at 22:21

## 2022-05-31 ENCOUNTER — APPOINTMENT (OUTPATIENT)
Dept: NUCLEAR MEDICINE | Facility: HOSPITAL | Age: 62
End: 2022-05-31

## 2022-05-31 ENCOUNTER — ANESTHESIA (OUTPATIENT)
Dept: GASTROENTEROLOGY | Facility: HOSPITAL | Age: 62
End: 2022-05-31

## 2022-05-31 LAB
ALBUMIN SERPL-MCNC: 3.6 G/DL (ref 3.5–5.2)
ALBUMIN/GLOB SERPL: 2 G/DL
ALP SERPL-CCNC: 44 U/L (ref 39–117)
ALT SERPL W P-5'-P-CCNC: 20 U/L (ref 1–33)
ANION GAP SERPL CALCULATED.3IONS-SCNC: 11 MMOL/L (ref 5–15)
AST SERPL-CCNC: 25 U/L (ref 1–32)
BASOPHILS # BLD AUTO: 0.02 10*3/MM3 (ref 0–0.2)
BASOPHILS NFR BLD AUTO: 0.4 % (ref 0–1.5)
BILIRUB SERPL-MCNC: 0.5 MG/DL (ref 0–1.2)
BUN SERPL-MCNC: 6 MG/DL (ref 8–23)
BUN/CREAT SERPL: 10.5 (ref 7–25)
CALCIUM SPEC-SCNC: 7 MG/DL (ref 8.6–10.5)
CHLORIDE SERPL-SCNC: 107 MMOL/L (ref 98–107)
CO2 SERPL-SCNC: 22 MMOL/L (ref 22–29)
CREAT SERPL-MCNC: 0.57 MG/DL (ref 0.57–1)
DEPRECATED RDW RBC AUTO: 58.7 FL (ref 37–54)
EGFRCR SERPLBLD CKD-EPI 2021: 103.5 ML/MIN/1.73
EOSINOPHIL # BLD AUTO: 0.14 10*3/MM3 (ref 0–0.4)
EOSINOPHIL NFR BLD AUTO: 2.5 % (ref 0.3–6.2)
ERYTHROCYTE [DISTWIDTH] IN BLOOD BY AUTOMATED COUNT: 17.7 % (ref 12.3–15.4)
GLOBULIN UR ELPH-MCNC: 1.8 GM/DL
GLUCOSE SERPL-MCNC: 81 MG/DL (ref 65–99)
HCT VFR BLD AUTO: 33.3 % (ref 34–46.6)
HGB BLD-MCNC: 10.9 G/DL (ref 12–15.9)
IMM GRANULOCYTES # BLD AUTO: 0.05 10*3/MM3 (ref 0–0.05)
IMM GRANULOCYTES NFR BLD AUTO: 0.9 % (ref 0–0.5)
LYMPHOCYTES # BLD AUTO: 1.05 10*3/MM3 (ref 0.7–3.1)
LYMPHOCYTES NFR BLD AUTO: 18.9 % (ref 19.6–45.3)
MCH RBC QN AUTO: 30.7 PG (ref 26.6–33)
MCHC RBC AUTO-ENTMCNC: 32.7 G/DL (ref 31.5–35.7)
MCV RBC AUTO: 93.8 FL (ref 79–97)
MONOCYTES # BLD AUTO: 0.51 10*3/MM3 (ref 0.1–0.9)
MONOCYTES NFR BLD AUTO: 9.2 % (ref 5–12)
NEUTROPHILS NFR BLD AUTO: 3.78 10*3/MM3 (ref 1.7–7)
NEUTROPHILS NFR BLD AUTO: 68.1 % (ref 42.7–76)
NRBC BLD AUTO-RTO: 0.2 /100 WBC (ref 0–0.2)
PLATELET # BLD AUTO: 222 10*3/MM3 (ref 140–450)
PMV BLD AUTO: 10 FL (ref 6–12)
POTASSIUM SERPL-SCNC: 3.7 MMOL/L (ref 3.5–5.2)
PROT SERPL-MCNC: 5.4 G/DL (ref 6–8.5)
RBC # BLD AUTO: 3.55 10*6/MM3 (ref 3.77–5.28)
SODIUM SERPL-SCNC: 140 MMOL/L (ref 136–145)
WBC NRBC COR # BLD: 5.55 10*3/MM3 (ref 3.4–10.8)

## 2022-05-31 PROCEDURE — 0DB68ZX EXCISION OF STOMACH, VIA NATURAL OR ARTIFICIAL OPENING ENDOSCOPIC, DIAGNOSTIC: ICD-10-PCS | Performed by: INTERNAL MEDICINE

## 2022-05-31 PROCEDURE — 85025 COMPLETE CBC W/AUTO DIFF WBC: CPT | Performed by: STUDENT IN AN ORGANIZED HEALTH CARE EDUCATION/TRAINING PROGRAM

## 2022-05-31 PROCEDURE — 80053 COMPREHEN METABOLIC PANEL: CPT | Performed by: STUDENT IN AN ORGANIZED HEALTH CARE EDUCATION/TRAINING PROGRAM

## 2022-05-31 PROCEDURE — 0DB98ZX EXCISION OF DUODENUM, VIA NATURAL OR ARTIFICIAL OPENING ENDOSCOPIC, DIAGNOSTIC: ICD-10-PCS | Performed by: INTERNAL MEDICINE

## 2022-05-31 PROCEDURE — 0 TECHNETIUM MEDRONATE KIT: Performed by: HOSPITALIST

## 2022-05-31 PROCEDURE — 99232 SBSQ HOSP IP/OBS MODERATE 35: CPT | Performed by: INTERNAL MEDICINE

## 2022-05-31 PROCEDURE — 88305 TISSUE EXAM BY PATHOLOGIST: CPT | Performed by: INTERNAL MEDICINE

## 2022-05-31 PROCEDURE — 25010000002 PROPOFOL 10 MG/ML EMULSION

## 2022-05-31 PROCEDURE — 0DB58ZX EXCISION OF ESOPHAGUS, VIA NATURAL OR ARTIFICIAL OPENING ENDOSCOPIC, DIAGNOSTIC: ICD-10-PCS | Performed by: INTERNAL MEDICINE

## 2022-05-31 PROCEDURE — A9503 TC99M MEDRONATE: HCPCS | Performed by: HOSPITALIST

## 2022-05-31 PROCEDURE — 43239 EGD BIOPSY SINGLE/MULTIPLE: CPT | Performed by: INTERNAL MEDICINE

## 2022-05-31 PROCEDURE — 78306 BONE IMAGING WHOLE BODY: CPT

## 2022-05-31 RX ORDER — PROMETHAZINE HYDROCHLORIDE 25 MG/1
25 SUPPOSITORY RECTAL ONCE AS NEEDED
Status: DISCONTINUED | OUTPATIENT
Start: 2022-05-31 | End: 2022-05-31 | Stop reason: HOSPADM

## 2022-05-31 RX ORDER — GLYCOPYRROLATE 0.2 MG/ML
INJECTION INTRAMUSCULAR; INTRAVENOUS AS NEEDED
Status: DISCONTINUED | OUTPATIENT
Start: 2022-05-31 | End: 2022-05-31 | Stop reason: SURG

## 2022-05-31 RX ORDER — LIDOCAINE HYDROCHLORIDE 20 MG/ML
INJECTION, SOLUTION INFILTRATION; PERINEURAL AS NEEDED
Status: DISCONTINUED | OUTPATIENT
Start: 2022-05-31 | End: 2022-05-31 | Stop reason: SURG

## 2022-05-31 RX ORDER — PROPOFOL 10 MG/ML
VIAL (ML) INTRAVENOUS AS NEEDED
Status: DISCONTINUED | OUTPATIENT
Start: 2022-05-31 | End: 2022-05-31 | Stop reason: SURG

## 2022-05-31 RX ORDER — PROMETHAZINE HYDROCHLORIDE 25 MG/1
25 TABLET ORAL ONCE AS NEEDED
Status: DISCONTINUED | OUTPATIENT
Start: 2022-05-31 | End: 2022-05-31 | Stop reason: HOSPADM

## 2022-05-31 RX ORDER — TC 99M MEDRONATE 20 MG/10ML
22 INJECTION, POWDER, LYOPHILIZED, FOR SOLUTION INTRAVENOUS
Status: COMPLETED | OUTPATIENT
Start: 2022-05-31 | End: 2022-05-31

## 2022-05-31 RX ORDER — SODIUM CHLORIDE 9 MG/ML
30 INJECTION, SOLUTION INTRAVENOUS CONTINUOUS PRN
Status: DISCONTINUED | OUTPATIENT
Start: 2022-05-31 | End: 2022-06-01 | Stop reason: HOSPADM

## 2022-05-31 RX ADMIN — GLYCOPYRROLATE 0.2 MG: 0.2 INJECTION INTRAMUSCULAR; INTRAVENOUS at 13:31

## 2022-05-31 RX ADMIN — PETROLATUM 1 APPLICATION: 420 OINTMENT TOPICAL at 06:29

## 2022-05-31 RX ADMIN — LOSARTAN POTASSIUM 50 MG: 50 TABLET, FILM COATED ORAL at 08:41

## 2022-05-31 RX ADMIN — TEMAZEPAM 15 MG: 15 CAPSULE ORAL at 20:49

## 2022-05-31 RX ADMIN — LIDOCAINE HYDROCHLORIDE 100 MG: 20 INJECTION, SOLUTION INFILTRATION; PERINEURAL at 13:31

## 2022-05-31 RX ADMIN — DULOXETINE HYDROCHLORIDE 30 MG: 30 CAPSULE, DELAYED RELEASE ORAL at 08:41

## 2022-05-31 RX ADMIN — APIXABAN 5 MG: 5 TABLET, FILM COATED ORAL at 20:49

## 2022-05-31 RX ADMIN — PROPOFOL 50 MG: 10 INJECTION, EMULSION INTRAVENOUS at 13:31

## 2022-05-31 RX ADMIN — PETROLATUM 1 APPLICATION: 420 OINTMENT TOPICAL at 22:45

## 2022-05-31 RX ADMIN — SODIUM CHLORIDE 125 ML/HR: 9 INJECTION, SOLUTION INTRAVENOUS at 06:36

## 2022-05-31 RX ADMIN — PANTOPRAZOLE SODIUM 40 MG: 40 INJECTION, POWDER, FOR SOLUTION INTRAVENOUS at 06:29

## 2022-05-31 RX ADMIN — Medication 22 MILLICURIE: at 07:15

## 2022-05-31 RX ADMIN — PROPOFOL 100 MG: 10 INJECTION, EMULSION INTRAVENOUS at 13:30

## 2022-05-31 RX ADMIN — Medication 10 ML: at 20:50

## 2022-05-31 RX ADMIN — GABAPENTIN 300 MG: 300 CAPSULE ORAL at 20:49

## 2022-05-31 RX ADMIN — Medication 10 ML: at 08:41

## 2022-05-31 RX ADMIN — PETROLATUM 1 APPLICATION: 420 OINTMENT TOPICAL at 14:52

## 2022-05-31 RX ADMIN — SODIUM CHLORIDE 30 ML/HR: 9 INJECTION, SOLUTION INTRAVENOUS at 12:50

## 2022-05-31 RX ADMIN — GABAPENTIN 300 MG: 300 CAPSULE ORAL at 08:41

## 2022-05-31 RX ADMIN — DULOXETINE HYDROCHLORIDE 30 MG: 30 CAPSULE, DELAYED RELEASE ORAL at 20:49

## 2022-05-31 NOTE — ANESTHESIA POSTPROCEDURE EVALUATION
"Patient: Martha aDvey    Procedure Summary     Date: 05/31/22 Room / Location:  [facility]    Anesthesia Start: 1326 Anesthesia Stop: 1343    Procedure: ESOPHAGOGASTRODUODENOSCOPY (N/A Esophagus) Diagnosis:       GERD (gastroesophageal reflux disease)      Nausea      (GERD (gastroesophageal reflux disease) [K21.9])      (Nausea [R11.0])    Surgeons: Theodore Najera MD Provider: Baljinder Deleon MD    Anesthesia Type: MAC ASA Status: 4          Anesthesia Type: MAC    Vitals  Vitals Value Taken Time   /61 05/31/22 1359   Temp     Pulse 101 05/31/22 1359   Resp 16 05/31/22 1359   SpO2 98 % 05/31/22 1359           Post Anesthesia Care and Evaluation    Patient location during evaluation: PACU  Patient participation: complete - patient participated  Level of consciousness: awake  Pain score: 0  Pain management: adequate  Airway patency: patent  Anesthetic complications: No anesthetic complications  PONV Status: none  Cardiovascular status: acceptable  Respiratory status: acceptable  Hydration status: acceptable    Comments: /61 (BP Location: Right arm, Patient Position: Sitting)   Pulse 101   Temp 36.7 °C (98 °F) (Oral)   Resp 16   Ht 157.5 cm (62\")   Wt 88.5 kg (195 lb)   LMP  (LMP Unknown)   SpO2 98%   BMI 35.67 kg/m²       "

## 2022-05-31 NOTE — ANESTHESIA PREPROCEDURE EVALUATION
Anesthesia Evaluation     Patient summary reviewed and Nursing notes reviewed                Airway   Mallampati: II  Dental - normal exam     Pulmonary - normal exam   (+) pulmonary embolism, sleep apnea on CPAP,   Cardiovascular - normal exam    ECG reviewed  PT is on anticoagulation therapy    (+) hypertension, valvular problems/murmurs murmur, hyperlipidemia,     ROS comment: reviewed echo    Neuro/Psych  (+) numbness,    GI/Hepatic/Renal/Endo    (+)  GERD,      Musculoskeletal     Abdominal   (+) obese,    Substance History      OB/GYN          Other      history of cancer                  Anesthesia Plan    ASA 4     MAC       Anesthetic plan, all risks, benefits, and alternatives have been provided, discussed and informed consent has been obtained with: patient.        CODE STATUS:    Code Status (Patient has no pulse and is not breathing): CPR (Attempt to Resuscitate)  Medical Interventions (Patient has pulse or is breathing): Full Support

## 2022-06-01 ENCOUNTER — READMISSION MANAGEMENT (OUTPATIENT)
Dept: CALL CENTER | Facility: HOSPITAL | Age: 62
End: 2022-06-01

## 2022-06-01 VITALS
RESPIRATION RATE: 18 BRPM | HEIGHT: 62 IN | SYSTOLIC BLOOD PRESSURE: 133 MMHG | HEART RATE: 88 BPM | BODY MASS INDEX: 35.88 KG/M2 | DIASTOLIC BLOOD PRESSURE: 77 MMHG | WEIGHT: 195 LBS | TEMPERATURE: 97.9 F | OXYGEN SATURATION: 96 %

## 2022-06-01 LAB
ALBUMIN SERPL-MCNC: 3.3 G/DL (ref 3.5–5.2)
ALBUMIN/GLOB SERPL: 1.7 G/DL
ALP SERPL-CCNC: 47 U/L (ref 39–117)
ALT SERPL W P-5'-P-CCNC: 17 U/L (ref 1–33)
ANION GAP SERPL CALCULATED.3IONS-SCNC: 12 MMOL/L (ref 5–15)
AST SERPL-CCNC: 20 U/L (ref 1–32)
BASOPHILS # BLD AUTO: 0.05 10*3/MM3 (ref 0–0.2)
BASOPHILS NFR BLD AUTO: 0.7 % (ref 0–1.5)
BILIRUB SERPL-MCNC: 0.6 MG/DL (ref 0–1.2)
BUN SERPL-MCNC: 5 MG/DL (ref 8–23)
BUN/CREAT SERPL: 8.1 (ref 7–25)
CALCIUM SPEC-SCNC: 7 MG/DL (ref 8.6–10.5)
CHLORIDE SERPL-SCNC: 106 MMOL/L (ref 98–107)
CO2 SERPL-SCNC: 21 MMOL/L (ref 22–29)
CREAT SERPL-MCNC: 0.62 MG/DL (ref 0.57–1)
DEPRECATED RDW RBC AUTO: 57 FL (ref 37–54)
EGFRCR SERPLBLD CKD-EPI 2021: 101.5 ML/MIN/1.73
EOSINOPHIL # BLD AUTO: 0.2 10*3/MM3 (ref 0–0.4)
EOSINOPHIL NFR BLD AUTO: 2.9 % (ref 0.3–6.2)
ERYTHROCYTE [DISTWIDTH] IN BLOOD BY AUTOMATED COUNT: 17.8 % (ref 12.3–15.4)
GLOBULIN UR ELPH-MCNC: 2 GM/DL
GLUCOSE SERPL-MCNC: 86 MG/DL (ref 65–99)
HCT VFR BLD AUTO: 34.2 % (ref 34–46.6)
HGB BLD-MCNC: 11.3 G/DL (ref 12–15.9)
IMM GRANULOCYTES # BLD AUTO: 0.03 10*3/MM3 (ref 0–0.05)
IMM GRANULOCYTES NFR BLD AUTO: 0.4 % (ref 0–0.5)
LAB AP CASE REPORT: NORMAL
LYMPHOCYTES # BLD AUTO: 1.09 10*3/MM3 (ref 0.7–3.1)
LYMPHOCYTES NFR BLD AUTO: 15.7 % (ref 19.6–45.3)
MCH RBC QN AUTO: 30.6 PG (ref 26.6–33)
MCHC RBC AUTO-ENTMCNC: 33 G/DL (ref 31.5–35.7)
MCV RBC AUTO: 92.7 FL (ref 79–97)
MONOCYTES # BLD AUTO: 0.56 10*3/MM3 (ref 0.1–0.9)
MONOCYTES NFR BLD AUTO: 8.1 % (ref 5–12)
NEUTROPHILS NFR BLD AUTO: 5.02 10*3/MM3 (ref 1.7–7)
NEUTROPHILS NFR BLD AUTO: 72.2 % (ref 42.7–76)
NRBC BLD AUTO-RTO: 0.1 /100 WBC (ref 0–0.2)
PATH REPORT.FINAL DX SPEC: NORMAL
PATH REPORT.GROSS SPEC: NORMAL
PLATELET # BLD AUTO: 230 10*3/MM3 (ref 140–450)
PMV BLD AUTO: 10.8 FL (ref 6–12)
POTASSIUM SERPL-SCNC: 3.2 MMOL/L (ref 3.5–5.2)
PROT SERPL-MCNC: 5.3 G/DL (ref 6–8.5)
RBC # BLD AUTO: 3.69 10*6/MM3 (ref 3.77–5.28)
SODIUM SERPL-SCNC: 139 MMOL/L (ref 136–145)
WBC NRBC COR # BLD: 6.95 10*3/MM3 (ref 3.4–10.8)

## 2022-06-01 PROCEDURE — 85025 COMPLETE CBC W/AUTO DIFF WBC: CPT | Performed by: INTERNAL MEDICINE

## 2022-06-01 PROCEDURE — 99232 SBSQ HOSP IP/OBS MODERATE 35: CPT | Performed by: INTERNAL MEDICINE

## 2022-06-01 PROCEDURE — 80053 COMPREHEN METABOLIC PANEL: CPT | Performed by: INTERNAL MEDICINE

## 2022-06-01 RX ORDER — TRAMADOL HYDROCHLORIDE 50 MG/1
50 TABLET ORAL EVERY 8 HOURS PRN
Status: DISCONTINUED | OUTPATIENT
Start: 2022-06-01 | End: 2022-06-01 | Stop reason: HOSPADM

## 2022-06-01 RX ORDER — ONDANSETRON 8 MG/1
8 TABLET, ORALLY DISINTEGRATING ORAL EVERY 8 HOURS PRN
Qty: 30 TABLET | Refills: 1 | Status: SHIPPED | OUTPATIENT
Start: 2022-06-01

## 2022-06-01 RX ADMIN — PETROLATUM 1 APPLICATION: 420 OINTMENT TOPICAL at 05:20

## 2022-06-01 RX ADMIN — PANTOPRAZOLE SODIUM 40 MG: 40 INJECTION, POWDER, FOR SOLUTION INTRAVENOUS at 05:20

## 2022-06-01 RX ADMIN — Medication 10 ML: at 08:59

## 2022-06-01 RX ADMIN — APIXABAN 5 MG: 5 TABLET, FILM COATED ORAL at 08:58

## 2022-06-01 RX ADMIN — ACETAMINOPHEN 650 MG: 325 TABLET ORAL at 04:42

## 2022-06-01 RX ADMIN — GABAPENTIN 300 MG: 300 CAPSULE ORAL at 08:58

## 2022-06-01 RX ADMIN — TRAMADOL HYDROCHLORIDE 50 MG: 50 TABLET, COATED ORAL at 05:21

## 2022-06-01 RX ADMIN — DULOXETINE HYDROCHLORIDE 30 MG: 30 CAPSULE, DELAYED RELEASE ORAL at 08:59

## 2022-06-01 RX ADMIN — POTASSIUM CHLORIDE 40 MEQ: 750 TABLET, EXTENDED RELEASE ORAL at 08:58

## 2022-06-01 RX ADMIN — LOSARTAN POTASSIUM 50 MG: 50 TABLET, FILM COATED ORAL at 08:59

## 2022-06-01 RX ADMIN — POTASSIUM CHLORIDE 40 MEQ: 750 TABLET, EXTENDED RELEASE ORAL at 06:46

## 2022-06-01 NOTE — PLAN OF CARE
Goal Outcome Evaluation:           Progress: improving  Outcome Evaluation: Pt being dc'd home per Dr. Quevedo, pt's heart monitor and IV removed, pt home with family CL

## 2022-06-01 NOTE — PROGRESS NOTES
[facility] INPATIENT PROGRESS NOTE    Length of Stay:  2 days    CHIEF COMPLAINT: Metastatic breast cancer admitted with persistent nausea vomiting and diarrhea      SUBJECTIVE: Patient with no complaints today, denies any further nausea.  She has not experienced a bowel movement since admission, no further diarrhea.  No new complaints today.    ROS:  Review of Systems   A comprehensive review of systems was obtained with pertinent positive findings as noted in the interval history above.  All other systems negative.    OBJECTIVE:  Vitals:    05/31/22 1412 05/31/22 1919 05/31/22 2221 06/01/22 0740   BP: 171/79 147/81 145/87 133/77   BP Location: Right arm Right arm Right arm Right arm   Patient Position: Lying Lying Lying Lying   Pulse: 106 95 90 88   Resp: 16 16 16 18   Temp: 98.4 °F (36.9 °C) 98.1 °F (36.7 °C) 98.2 °F (36.8 °C) 97.9 °F (36.6 °C)   TempSrc: Oral Oral Oral Oral   SpO2: 96%      Weight:       Height:             PHYSICAL EXAMINATION:  General: Alert and orient x3 no distress  Chest/Lungs: Clear to auscultation bilaterally anteriorly  Heart: Regular rate and rhythm no murmurs Rubs  Abdomen/GI: Soft nontender nondistended bowel sounds present  Extremities: No edema    Patient was examined today, unchanged from above    DIAGNOSTIC DATA:  Results Review:     I reviewed the patient's new clinical results.    Results from last 7 days   Lab Units 06/01/22 0456 05/31/22 0448 05/30/22  0456   WBC 10*3/mm3 6.95 5.55 4.77   HEMOGLOBIN g/dL 11.3* 10.9* 11.3*   HEMATOCRIT % 34.2 33.3* 34.4   PLATELETS 10*3/mm3 230 222 247      Results from last 7 days   Lab Units 06/01/22  0456 05/31/22 0448 05/30/22  1848 05/30/22 0456 05/29/22  1240   SODIUM mmol/L 139 140  --  139 140   POTASSIUM mmol/L 3.2* 3.7 4.4 3.2* 3.8   CHLORIDE mmol/L 106 107  --  106 104   CO2 mmol/L 21.0* 22.0  --  22.0 25.0   BUN mg/dL 5* 6*  --  12 14   CREATININE mg/dL 0.62 0.57  --  0.72 0.84   CALCIUM mg/dL 7.0* 7.0*  --  7.2*  8.2*   BILIRUBIN mg/dL 0.6 0.5  --   --  0.6   ALK PHOS U/L 47 44  --   --  52   ALT (SGPT) U/L 17 20  --   --  22   AST (SGOT) U/L 20 25  --   --  23   GLUCOSE mg/dL 86 81  --  87 105*      Lab Results   Component Value Date    NEUTROABS 5.02 06/01/2022                 Assessment & Plan   ASSESSMENT/PLAN:  This is a 61 y.o. female with:     *Metastatic breast cancer to bone (ER/WV positive, HER2/peter negative)  · The patient has metastatic breast cancer to bone (ER/WV positive, HER2/peter negative), originally diagnosed in 2010 with subsequent recurrent/metastatic disease to bone diagnosed in 2017.    · She received palliative radiation therapy to the thoracic spine (T3-T9) and to L3 as well as left humerus.    · She initiated single agent palliative Xeloda chemotherapy in February 2018 in addition to monthly Xgeva.    · She has been maintained on treatment with Xeloda ever since that time, requiring some dose alterations due to significant hand-foot syndrome.    · Most recently she has been receiving Xeloda 1000 mg in the morning, 1500 mg in the evening for 7 days on followed by 7 days off.    · She last received monthly Xgeva on 5/2/2022, due next today however due to the holiday planned for delay until 6/6/2022.    · We had discussed delaying follow-up scans until early August with her last CT scan chest abdomen pelvis and bone scan on 3/28/2022 showing stable findings.  · Patient admitted on 5/29/2022 with approximately 2-week history of nausea vomiting and loose stool.  Report of additional family members with similar illness suggestive of viral gastroenteritis.  · Patient underwent CT chest abdomen pelvis 5/30/2022 which was read by radiology as showing possible progressive disease.  There was a new 5 mm nodule in the right lower lobe, more prominent left supraclavicular lymph nodes (increased from 5 mm to 9 mm), subcarinal lymph node slightly larger increased from 4 mm to 8 mm.  Subcutaneous nodule right  "posterior lateral chest wall was 1.8 x 1.9, currently 1.6 x 2.1 cm.  Stable chronic dilation common bile duct 1 cm status postcholecystectomy.  Increase in mild stranding in the root of the mesentery with \"shotty\" mesenteric lymph nodes that have increased (1 measuring 6 mm).  Stable bony metastatic disease.  · Bone scan 5/31/2022 with stable disease  · EGD 5/31/2022 with no evidence of metastatic involvement  · Patient currently continues with oral Xeloda on hold due to her recent GI symptoms.  Suspect that she had a viral gastroenteritis with subsequent exacerbation of her GI symptoms when she tried to resume oral Xeloda earlier this past week.  Reviewed CT findings and they are of doubtful significance, none of the lymph nodes are pathologically enlarged with minimal change in size.  The lesion in the right lateral chest wall is not related to metastatic breast cancer (previously biopsied) and again change in the size is questionable.  The new 5 mm lung nodule will need to be monitored.  Bone scan 5/31/2022 with stable findings.  Patient will remain off of oral Xeloda for the remainder of this week and then resume treatment when she is seen back on 6/6/2022  assuming her GI symptoms have resolved.  We will keep future scans as currently scheduled in early August.   Anticipate discharge today.     *Nausea, vomiting and loose stool with history of ulcerative colitis, GERD  · Patient with history of ulcerative colitis is on mesalamine, managed by Dr. Ocasio in the outpatient setting  · Patient also with history of GERD, had been receiving omeprazole 20 mg twice daily and Carafate 1 g 4 times daily (patient notes she had not been taking this recently) as outpatient  · Patient does have a history of C. difficile colitis in 2018  · Patient presented to the emergency department on 5/29/2022 reporting that she had experienced ongoing nausea and vomiting for approximately 2 weeks, and for 3 days prior to admission had been " unable to keep her medications down.  She was having some loose stool, no overt diarrhea.    · Patient was admitted and placed on IV fluid support with normal saline 125 cc/h as well as IV Protonix 40 mg daily, Zofran 4 mg p.o./IV every 6 hours as needed, Phenergan 12.5 mg IV every 6 hours as needed.  · Patient underwent EGD 5/31/2022 with identification of reflux esophagitis, biopsy obtained, result pending  · Patient to continue daily pantoprazole and supplement with Pepcid 20 mg in the evenings if needed as outpatient per GI  · Suspect that patient had viral gastroenteritis that was subsequently exacerbated this past week when she tried to resume her Xeloda chemotherapy.     *History of PE  · The patient also has a history of right-sided pulmonary embolism in the setting of metastatic breast cancer diagnosed 10/21/2017 and has been maintained chronically on anticoagulation with Eliquis 5 mg twice daily.  She is felt to require long-term anticoagulation given risk of recurrent thrombosis due to metastatic malignancy.  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  For now during hospitalization continuing with Aquaphor 3 times daily.  Symptoms stable.     *Depression  · Patient is followed in the supportive oncology clinic  · Patient is chronically on Cymbalta 30 mg twice daily, gabapentin 600 mg nightly  · Patient is currently continuing on Cymbalta 30 mg twice daily        PLAN:   1. Patient appropriate for discharge from oncology standpoint   2. Patient will need to remain off of oral Xeloda the remainder of this week following discharge until she is seen back in the office on 6/6/2022.  We will consider resuming oral Xeloda at that time pending status of her GI symptoms.    Discussed with patient at bedside.              Ubaldo Hancock MD

## 2022-06-01 NOTE — PLAN OF CARE
Goal Outcome Evaluation:  Plan of Care Reviewed With: patient        Progress: improving  Outcome Evaluation: Pt had no c/o pain or discomfort. Rested well. Up ad dilcia. EDG and bone scan complete, pt reports that Oncology MD has seen her and states that everything is good. Pt is scheduled for a colonoscopy outpatient. IVF's d/c'd. Probably home today. CTM, safety maintained.

## 2022-06-01 NOTE — PROGRESS NOTES
[facility]  Inpatient Progress Note    Reason for Followup:  Nausea/vomiting    Subjective     Interval History:   Much improved. Tolerating diet    Current Facility-Administered Medications:   •  acetaminophen (TYLENOL) tablet 500 mg, 500 mg, Oral, Q6H PRN, Theodore Najera MD  •  acetaminophen (TYLENOL) tablet 650 mg, 650 mg, Oral, Q4H PRN, Theodore Najera MD, 650 mg at 06/01/22 0442  •  apixaban (ELIQUIS) tablet 5 mg, 5 mg, Oral, Q12H, Theodore Najera MD, 5 mg at 05/31/22 2049  •  DULoxetine (CYMBALTA) DR capsule 30 mg, 30 mg, Oral, BID, Theodore Najera MD, 30 mg at 05/31/22 2049  •  gabapentin (NEURONTIN) capsule 300 mg, 300 mg, Oral, BID, Theodore Najera MD, 300 mg at 05/31/22 2049  •  losartan (COZAAR) tablet 50 mg, 50 mg, Oral, Daily, Theodore Najera MD, 50 mg at 05/31/22 0841  •  nitroglycerin (NITROSTAT) SL tablet 0.4 mg, 0.4 mg, Sublingual, Q5 Min PRN, Theodore Najera MD  •  ondansetron (ZOFRAN) tablet 4 mg, 4 mg, Oral, Q6H PRN **OR** ondansetron (ZOFRAN) injection 4 mg, 4 mg, Intravenous, Q6H PRN, Theodore Najera MD  •  ondansetron ODT (ZOFRAN-ODT) disintegrating tablet 8 mg, 8 mg, Oral, Q8H PRN, Theodore Najera MD, 8 mg at 05/30/22 0807  •  pantoprazole (PROTONIX) injection 40 mg, 40 mg, Intravenous, Q AM, Theodore Najera MD, 40 mg at 06/01/22 0520  •  Petrolatum ointment 1 application, 1 application, Topical, Q8H, Theodore Najera MD, 1 application at 06/01/22 0520  •  potassium chloride (K-DUR,KLOR-CON) ER tablet 40 mEq, 40 mEq, Oral, PRN, 40 mEq at 06/01/22 0646 **OR** potassium chloride (KLOR-CON) packet 40 mEq, 40 mEq, Oral, PRN **OR** potassium chloride 10 mEq in 100 mL IVPB, 10 mEq, Intravenous, Q1H PRN, Theodore Najera MD  •  prochlorperazine (COMPAZINE) tablet 10 mg, 10 mg, Oral, Q6H PRN, Theodore Najera MD, 10 mg at 05/29/22 6473  •  promethazine (PHENERGAN) 12.5 mg in sodium chloride 0.9 % 50 mL, 12.5 mg,  Intravenous, Q6H PRN, Theodore Najera MD, 12.5 mg at 05/29/22 1725  •  [COMPLETED] Insert peripheral IV, , , Once **AND** sodium chloride 0.9 % flush 10 mL, 10 mL, Intravenous, PRN, Theodore Najera MD  •  sodium chloride 0.9 % flush 10 mL, 10 mL, Intravenous, Q12H, Theodore Najera MD, 10 mL at 05/31/22 2050  •  sodium chloride 0.9 % flush 10 mL, 10 mL, Intravenous, PRN, Theodore Najera MD  •  sodium chloride 0.9 % infusion, 30 mL/hr, Intravenous, Continuous PRN, Theodore Najera MD, Stopped at 05/31/22 1336  •  temazepam (RESTORIL) capsule 15 mg, 15 mg, Oral, Nightly PRN, Theodore Njaera MD, 15 mg at 05/31/22 2049  •  traMADol (ULTRAM) tablet 50 mg, 50 mg, Oral, Q8H PRN, Glory Butler, APRN, 50 mg at 06/01/22 0521  Review of Systems:    The following systems were reviewed and negative;  gastrointestinal    Objective     Vital Signs  Temp:  [97.9 °F (36.6 °C)-98.4 °F (36.9 °C)] 97.9 °F (36.6 °C)  Heart Rate:  [] 88  Resp:  [16-18] 18  BP: ()/(54-87) 133/77  Body mass index is 35.67 kg/m².    Intake/Output Summary (Last 24 hours) at 6/1/2022 0800  Last data filed at 5/31/2022 1336  Gross per 24 hour   Intake 200 ml   Output --   Net 200 ml     No intake/output data recorded.     Physical Exam:   General: patient awake, alert and cooperative   Abdomen: soft, nontender, nondistended; normal bowel sounds   Rectal: deferred   Extremities: no rash or edema   Psychiatric: Normal mood and behavior; memory intact     Results Review:     I reviewed the patient's new clinical results.    Results from last 7 days   Lab Units 06/01/22  0456 05/31/22  0448 05/30/22  0456   WBC 10*3/mm3 6.95 5.55 4.77   HEMOGLOBIN g/dL 11.3* 10.9* 11.3*   HEMATOCRIT % 34.2 33.3* 34.4   PLATELETS 10*3/mm3 230 222 247     Results from last 7 days   Lab Units 06/01/22  0456 05/31/22  0448 05/30/22  1848 05/30/22  0456 05/29/22  1240   SODIUM mmol/L 139 140  --  139 140   POTASSIUM mmol/L 3.2* 3.7 4.4  3.2* 3.8   CHLORIDE mmol/L 106 107  --  106 104   CO2 mmol/L 21.0* 22.0  --  22.0 25.0   BUN mg/dL 5* 6*  --  12 14   CREATININE mg/dL 0.62 0.57  --  0.72 0.84   CALCIUM mg/dL 7.0* 7.0*  --  7.2* 8.2*   BILIRUBIN mg/dL 0.6 0.5  --   --  0.6   ALK PHOS U/L 47 44  --   --  52   ALT (SGPT) U/L 17 20  --   --  22   AST (SGOT) U/L 20 25  --   --  23   GLUCOSE mg/dL 86 81  --  87 105*         No results found for: LIPASE    Radiology:  [unfilled]      Assessment & Plan   Assessment:   1.  Nausea/vomiting  2.  Diarrhea  3.  Hx of colon polyps  4.  Esophagitis  5.  Metastatic breast CA    All problems new to me today    Plan:   EGD notable for mild esophagitis only.  Biopsies are pending and Dr. Najera's office will contact the patient with those results once he has signed off on them.  She can follow-up in the office with Dr. Henriquez to discuss whether or not she will need to have outpatient colonoscopy.  Would recommend daily pantoprazole and she could supplement in the evenings with 20 mg of Pepcid as needed.    GI will sign off.  Information for Dr Henriquez's office placed on ADT summary    I discussed the patient's findings and my recommendations with patient.          Huang Wilks M.D.  [facility]  [address] [place] [address]  Office: [phone]

## 2022-06-01 NOTE — CASE MANAGEMENT/SOCIAL WORK
Case Management Discharge Note      Final Note: home    Provided Post Acute Provider List?: N/A  Provided Post Acute Provider Quality & Resource List?: N/A    Selected Continued Care - Discharged on 6/1/2022 Admission date: 5/29/2022 - Discharge disposition: Home or Self Care    Destination    No services have been selected for the patient.              Durable Medical Equipment    No services have been selected for the patient.              Dialysis/Infusion    No services have been selected for the patient.              Home Medical Care    No services have been selected for the patient.              Therapy    No services have been selected for the patient.              Community Resources    No services have been selected for the patient.              Community & DME    No services have been selected for the patient.                Selected Continued Care - Episodes Includes selections from active Coordinated Care Management episodes    Oncology Episode start date: 11/8/2021   There are no active outsourced providers for this episode.               Transportation Services  Private: Car    Final Discharge Disposition Code: 01 - home or self-care

## 2022-06-01 NOTE — DISCHARGE SUMMARY
PHYSICIAN DISCHARGE SUMMARY                                                                        [facility]    Patient Identification:  Name: Martha Davey  Age: 61 y.o.  Sex: female  :  1960  MRN: [id number]  Primary Care Physician: Jazmine Chanel MD    Admit date: 2022  Discharge date and time:2022  Discharged Condition: good    Discharge Diagnoses:  Active Hospital Problems    Diagnosis  POA   • **Nausea & vomiting [R11.2]  Yes   • Intractable vomiting [R11.10]  Yes   • Metastatic breast cancer (HCC) [C50.919]  Yes   • Gastroesophageal reflux disease [K21.9]  Yes      Resolved Hospital Problems   No resolved problems to display.          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   • Charcot-Saloni-Tooth disease    • CPAP (continuous positive airway pressure) dependence    • Dry eye    • GERD (gastroesophageal reflux disease)    • H/O Colon polyps    • H/O Uterine fibroid    • Heart murmur    • Hyperlipidemia    • Hypertension    • PONV (postoperative nausea and vomiting)      PSHX:   Past Surgical History:   Procedure Laterality Date   • BLADDER SURGERY      Bladder lift   • BREAST RECONSTRUCTION, BREAST TISSUE EXPANDER INSERTION Right    • BREAST SURGERY Right 2010    Mastectomy   •  SECTION  1992   • CHOLECYSTECTOMY     • COLONOSCOPY     • ENDOSCOPY N/A 2022    Procedure: ESOPHAGOGASTRODUODENOSCOPY;  Surgeon: Theodore Najera MD;  Location: [facility];  Service: Gastroenterology;  Laterality: N/A;  PRE: GERD  POST: HIATAL HERNIA, GASTRITIS   • HERNIA REPAIR  2015    [facility]   • HYSTERECTOMY      Partial   • KNEE SURGERY Right    • LEG  SURGERY Left     Broken   • MASTECTOMY Right 2010   • NE TREAT TIBIAL SHAFT FX, INTRAMED IMPLANT Left 12/6/2017    Procedure: TIBIA INTRAMEDULLARY NAIL/DON INSERTION;  Surgeon: Romero Shanks MD;  Location: [facility];  Service: Orthopedics   • THORACIC DECOMPRESSION POSTERIOR FUSION WITH INSTRUMENTATION N/A 12/11/2017    Procedure: T6 costotransversectomy and decompression with T3 to  T9 posterior spinal fusion with instrumentation with Jennifer Gonzalez and Nael No Cellsaver;  Surgeon: Quan Nayak MD;  Location: [facility];  Service:        Hospital Course: Martha Davey  is a 61 y.o. female with history of metastatic breast cancer, GERD, arthritis, hypertension and hyperlipidemia who presents to the hospital complaining of nausea and vomiting.  This is been ongoing for the past 2 weeks.  Over the past 3 days she has been able to keep her medicines down.  She is on oral chemotherapy for metastatic breast cancer as well as Eliquis.  She has had some mild dysuria.  She also reports loose stool.  No fever.  No cough.  Symptoms are constant.  Nothing makes this worse or better.  The patient was evaluated the ER initially admitted to the observation unit.  Oncology was consulted and they wanted to get a whole-body bone scan and ask GI to do upper endoscopy and have the patient admitted to the hospital.  Hospitalist was consulted to admit the patient for further treatment.          The patient was admitted to hospital and seen by GI medicine and oncology.  Patient had EGD which showed some gastritis.  The patient was feeling better after being in the hospital for couple days and nausea and vomiting subsided.  She felt well enough to go home and she will continue with her usual medicines.  She will follow-up with oncology and follow-up with her primary care and also follow-up with GI medicine.  Biopsies from EGD are pending at discharge.    Consults:     Consults     Date and Time Order Name Status Description     5/30/2022  9:53 AM Inpatient Internal Medicine Consult      5/30/2022  9:51 AM Inpatient Gastroenterology Consult Completed     5/29/2022  3:00 PM Inpatient Hematology & Oncology Consult Completed         Results from last 7 days   Lab Units 06/01/22  0456   WBC 10*3/mm3 6.95   HEMOGLOBIN g/dL 11.3*   HEMATOCRIT % 34.2   PLATELETS 10*3/mm3 230     Results from last 7 days   Lab Units 06/01/22  0456   SODIUM mmol/L 139   POTASSIUM mmol/L 3.2*   CHLORIDE mmol/L 106   CO2 mmol/L 21.0*   BUN mg/dL 5*   CREATININE mg/dL 0.62   GLUCOSE mg/dL 86   CALCIUM mg/dL 7.0*     Significant Diagnostic Studies:   WBC   Date Value Ref Range Status   06/01/2022 6.95 3.40 - 10.80 10*3/mm3 Final     Hemoglobin   Date Value Ref Range Status   06/01/2022 11.3 (L) 12.0 - 15.9 g/dL Final     Hematocrit   Date Value Ref Range Status   06/01/2022 34.2 34.0 - 46.6 % Final     Platelets   Date Value Ref Range Status   06/01/2022 230 140 - 450 10*3/mm3 Final     Sodium   Date Value Ref Range Status   06/01/2022 139 136 - 145 mmol/L Final     Potassium   Date Value Ref Range Status   06/01/2022 3.2 (L) 3.5 - 5.2 mmol/L Final     Chloride   Date Value Ref Range Status   06/01/2022 106 98 - 107 mmol/L Final     CO2   Date Value Ref Range Status   06/01/2022 21.0 (L) 22.0 - 29.0 mmol/L Final     BUN   Date Value Ref Range Status   06/01/2022 5 (L) 8 - 23 mg/dL Final     Creatinine   Date Value Ref Range Status   06/01/2022 0.62 0.57 - 1.00 mg/dL Final     Glucose   Date Value Ref Range Status   06/01/2022 86 65 - 99 mg/dL Final     Calcium   Date Value Ref Range Status   06/01/2022 7.0 (L) 8.6 - 10.5 mg/dL Final     AST (SGOT)   Date Value Ref Range Status   06/01/2022 20 1 - 32 U/L Final     ALT (SGPT)   Date Value Ref Range Status   06/01/2022 17 1 - 33 U/L Final     Alkaline Phosphatase   Date Value Ref Range Status   06/01/2022 47 39 - 117 U/L Final     No results found for: APTT, INR  Color, UA   Date Value Ref Range Status   05/29/2022  Yellow Yellow, Straw Final     Appearance, UA   Date Value Ref Range Status   05/29/2022 Clear Clear Final     pH, UA   Date Value Ref Range Status   05/29/2022 6.5 5.0 - 8.0 Final     Glucose, UA   Date Value Ref Range Status   05/29/2022 Negative Negative Final     Ketones, UA   Date Value Ref Range Status   05/29/2022 15 mg/dL (1+) (A) Negative Final     Blood, UA   Date Value Ref Range Status   05/29/2022 Small (1+) (A) Negative Final     Leuk Esterase, UA   Date Value Ref Range Status   05/29/2022 Negative Negative Final     Bilirubin, UA   Date Value Ref Range Status   05/29/2022 Negative Negative Final     Urobilinogen, UA   Date Value Ref Range Status   05/29/2022 1.0 E.U./dL 0.2 - 1.0 E.U./dL Final     RBC, UA   Date Value Ref Range Status   05/29/2022 6-12 (A) None Seen, 0-2 /HPF Final     WBC, UA   Date Value Ref Range Status   05/29/2022 3-5 (A) None Seen, 0-2 /HPF Final     Comment:     Urine culture not indicated.     Bacteria, UA   Date Value Ref Range Status   05/29/2022 1+ (A) None Seen /HPF Final     No results found for: TROPONINT, TROPONINI, BNP  No components found for: HGBA1C;2  No components found for: TSH;2  Imaging Results (All)     Procedure Component Value Units Date/Time    NM Bone Scan Whole Body [[id number]] Collected: 05/31/22 1559     Updated: 05/31/22 1722    Narrative:      WHOLE BODY BONE SCAN     HISTORY: Follow-up metastatic breast cancer.     TECHNIQUE: Whole body bone scan was performed using 22 mCi of technetium  MDP and is correlated with CT scans of the chest, abdomen, and the  pelvis performed yesterday, multiple previous nuclear medicine bone  scans, most recently 03/28/2022, other prior CT scans, and the oncology  consultation note by Dr. Hancock from earlier today.     FINDINGS: There is normal genitourinary radiotracer activity. The  pattern of abnormal activity in the skeleton is unchanged in comparison  with several prior exams as remote as 01/31/2022. There is  abnormal  uptake in the sternum, along the thoracic spine, proximal left humerus,  anterior midline of the skull, and the right inferior ischiopubic ramus.  No new scintigraphic abnormality is present.       Impression:      Stable abnormal bone scan.     This report was finalized on 5/31/2022 5:19 PM by Dr. Ubaldo Herrera M.D.       CT Chest With Contrast Diagnostic [[id number]] Collected: 05/30/22 0320     Updated: 05/30/22 0348    Narrative:      CT CHEST WITH CONTRAST; CT OF THE ABDOMEN AND PELVIS WITH CONTRAST     HISTORY: Metastatic breast cancer. Nausea and vomiting.     COMPARISON: 03/28/2022     TECHNIQUE: Axial CT imaging was obtained from the thoracic inlet through  the symphysis pubis. IV contrast was administered.     FINDINGS:  CT OF THE CHEST: Prior study described scattered noncalcified pulmonary  nodules within the right lung are noted. Most of these appear to be  stable. There is a nodule within the right lower lobe, measuring up to 5  mm in size, which was not clearly identified on the prior exam. There is  heterogeneous appearance to the thyroid gland. This can be better  assessed with dedicated thyroid ultrasound. Left supraclavicular lymph  nodes have become more prominent when compared to prior exam. For  example, one measures up to 9 mm, previously measuring 5 mm. Subcarinal  lymph node also appears slightly larger, measuring 8 mm spleen measuring  4 mm. Subcutaneous nodule within the right posterior lateral chest wall  measures 1.6 x 2.1 cm, which is larger than on prior exam. Previously,  it measured 1.8 x 1.9 cm. There is stable mild aneurysmal dilatation of  the ascending thoracic aorta, measuring up to 4.2 cm. There is no  evidence of dissection. There is a moderate hiatal hernia.     CT OF THE ABDOMEN AND PELVIS: Liver is steatotic. Tiny low-attenuation  lesion within the right hepatic lobe remains able. Gallbladder is  surgically absent. Spleen adrenal glands are normal. Pancreas  is  atrophic. There is some dilatation of the common bile duct, measuring up  1 cm. This is stable when compared to prior exam, and may be post  cholecystectomy. Duodenum is normal. Kidneys enhance symmetrically.  There is no hydronephrosis. There is a circumaortic left renal vein.  Urinary bladder appears somewhat thick-walled. Correlation with  urinalysis and urine cultures is recommended. Uterus is surgically  absent. There is diverticulosis. The appendix is normal. There is no  bowel obstruction. There is a tiny fat-containing right paramedian  ventral hernia. There is some mild stranding within the root of  mesentery, as well as some shotty mesenteric lymph nodes. These have  increased when compared to prior exam. For example, one measures up to 6  mm in short axis dimensions. No suspicious adnexal masses are seen.  Patient has a known history of osseous metastatic disease. There are  changes of prior thoracic spinal fusion, extending from T3 through T9.  Osseous metastatic disease appears grossly stable.       Impression:         1. There is a nodule within the right lower lobe, measuring up to 5 mm,  which appears to be new when compared to prior exam. Left  supraclavicular lymph nodes have increased in size when compared to  prior exam, as has a subcarinal lymph node. In addition, the nodule  within the right posterior lateral chest wall has increased in size.  Constellation of findings is worrisome for progressive metastatic  disease.  2. No evidence of mechanical bowel obstruction.  3. The patient has had some increase in small lymph nodes identified  within the mesentery when compared to exam. Given the findings within  the thorax, some additional metastatic involvement cannot be excluded.  4. Osseous metastatic disease is grossly stable.     Radiation dose reduction techniques were utilized, including automated  exposure control and exposure modulation based on body size.     This report was finalized on  5/30/2022 3:44 AM by Dr. Ondina Dyer M.D.       CT Abdomen Pelvis With Contrast [[id number]] Collected: 05/30/22 0320     Updated: 05/30/22 0348    Narrative:      CT CHEST WITH CONTRAST; CT OF THE ABDOMEN AND PELVIS WITH CONTRAST     HISTORY: Metastatic breast cancer. Nausea and vomiting.     COMPARISON: 03/28/2022     TECHNIQUE: Axial CT imaging was obtained from the thoracic inlet through  the symphysis pubis. IV contrast was administered.     FINDINGS:  CT OF THE CHEST: Prior study described scattered noncalcified pulmonary  nodules within the right lung are noted. Most of these appear to be  stable. There is a nodule within the right lower lobe, measuring up to 5  mm in size, which was not clearly identified on the prior exam. There is  heterogeneous appearance to the thyroid gland. This can be better  assessed with dedicated thyroid ultrasound. Left supraclavicular lymph  nodes have become more prominent when compared to prior exam. For  example, one measures up to 9 mm, previously measuring 5 mm. Subcarinal  lymph node also appears slightly larger, measuring 8 mm spleen measuring  4 mm. Subcutaneous nodule within the right posterior lateral chest wall  measures 1.6 x 2.1 cm, which is larger than on prior exam. Previously,  it measured 1.8 x 1.9 cm. There is stable mild aneurysmal dilatation of  the ascending thoracic aorta, measuring up to 4.2 cm. There is no  evidence of dissection. There is a moderate hiatal hernia.     CT OF THE ABDOMEN AND PELVIS: Liver is steatotic. Tiny low-attenuation  lesion within the right hepatic lobe remains able. Gallbladder is  surgically absent. Spleen adrenal glands are normal. Pancreas is  atrophic. There is some dilatation of the common bile duct, measuring up  1 cm. This is stable when compared to prior exam, and may be post  cholecystectomy. Duodenum is normal. Kidneys enhance symmetrically.  There is no hydronephrosis. There is a circumaortic left renal  vein.  Urinary bladder appears somewhat thick-walled. Correlation with  urinalysis and urine cultures is recommended. Uterus is surgically  absent. There is diverticulosis. The appendix is normal. There is no  bowel obstruction. There is a tiny fat-containing right paramedian  ventral hernia. There is some mild stranding within the root of  mesentery, as well as some shotty mesenteric lymph nodes. These have  increased when compared to prior exam. For example, one measures up to 6  mm in short axis dimensions. No suspicious adnexal masses are seen.  Patient has a known history of osseous metastatic disease. There are  changes of prior thoracic spinal fusion, extending from T3 through T9.  Osseous metastatic disease appears grossly stable.       Impression:         1. There is a nodule within the right lower lobe, measuring up to 5 mm,  which appears to be new when compared to prior exam. Left  supraclavicular lymph nodes have increased in size when compared to  prior exam, as has a subcarinal lymph node. In addition, the nodule  within the right posterior lateral chest wall has increased in size.  Constellation of findings is worrisome for progressive metastatic  disease.  2. No evidence of mechanical bowel obstruction.  3. The patient has had some increase in small lymph nodes identified  within the mesentery when compared to exam. Given the findings within  the thorax, some additional metastatic involvement cannot be excluded.  4. Osseous metastatic disease is grossly stable.     Radiation dose reduction techniques were utilized, including automated  exposure control and exposure modulation based on body size.     This report was finalized on 5/30/2022 3:44 AM by Dr. Ondina Dyer M.D.           Lab Results (last 7 days)     Procedure Component Value Units Date/Time    Comprehensive Metabolic Panel [[id number]]  (Abnormal) Collected: 06/01/22 0456    Specimen: Blood Updated: 06/01/22 0543     Glucose 86 mg/dL       BUN 5 mg/dL      Creatinine 0.62 mg/dL      Sodium 139 mmol/L      Potassium 3.2 mmol/L      Chloride 106 mmol/L      CO2 21.0 mmol/L      Calcium 7.0 mg/dL      Total Protein 5.3 g/dL      Albumin 3.30 g/dL      ALT (SGPT) 17 U/L      AST (SGOT) 20 U/L      Alkaline Phosphatase 47 U/L      Total Bilirubin 0.6 mg/dL      Globulin 2.0 gm/dL      A/G Ratio 1.7 g/dL      BUN/Creatinine Ratio 8.1     Anion Gap 12.0 mmol/L      eGFR 101.5 mL/min/1.73      Comment: National Kidney Foundation and American Society of Nephrology (ASN) Task Force recommended calculation based on the Chronic Kidney Disease Epidemiology Collaboration (CKD-EPI) equation refit without adjustment for race.       Narrative:      GFR Normal >60  Chronic Kidney Disease <60  Kidney Failure <15      CBC & Differential [[id number]]  (Abnormal) Collected: 06/01/22 0456    Specimen: Blood Updated: 06/01/22 0523    Narrative:      The following orders were created for panel order CBC & Differential.  Procedure                               Abnormality         Status                     ---------                               -----------         ------                     CBC Auto Differential[[id number]]        Abnormal            Final result                 Please view results for these tests on the individual orders.    CBC Auto Differential [[id number]]  (Abnormal) Collected: 06/01/22 0456    Specimen: Blood Updated: 06/01/22 0523     WBC 6.95 10*3/mm3      RBC 3.69 10*6/mm3      Hemoglobin 11.3 g/dL      Hematocrit 34.2 %      MCV 92.7 fL      MCH 30.6 pg      MCHC 33.0 g/dL      RDW 17.8 %      RDW-SD 57.0 fl      MPV 10.8 fL      Platelets 230 10*3/mm3      Neutrophil % 72.2 %      Lymphocyte % 15.7 %      Monocyte % 8.1 %      Eosinophil % 2.9 %      Basophil % 0.7 %      Immature Grans % 0.4 %      Neutrophils, Absolute 5.02 10*3/mm3      Lymphocytes, Absolute 1.09 10*3/mm3      Monocytes, Absolute 0.56 10*3/mm3      Eosinophils, Absolute 0.20  10*3/mm3      Basophils, Absolute 0.05 10*3/mm3      Immature Grans, Absolute 0.03 10*3/mm3      nRBC 0.1 /100 WBC     Tissue Pathology Exam [[id number]] Collected: 05/31/22 1333    Specimen: Tissue from Small Intestine, Duodenum; Tissue from Gastric, Body; Tissue from GE Junction Updated: 05/31/22 1421    Comprehensive Metabolic Panel [[id number]]  (Abnormal) Collected: 05/31/22 0448    Specimen: Blood Updated: 05/31/22 0546     Glucose 81 mg/dL      BUN 6 mg/dL      Creatinine 0.57 mg/dL      Sodium 140 mmol/L      Potassium 3.7 mmol/L      Chloride 107 mmol/L      CO2 22.0 mmol/L      Calcium 7.0 mg/dL      Total Protein 5.4 g/dL      Albumin 3.60 g/dL      ALT (SGPT) 20 U/L      AST (SGOT) 25 U/L      Alkaline Phosphatase 44 U/L      Total Bilirubin 0.5 mg/dL      Globulin 1.8 gm/dL      A/G Ratio 2.0 g/dL      BUN/Creatinine Ratio 10.5     Anion Gap 11.0 mmol/L      eGFR 103.5 mL/min/1.73      Comment: National Kidney Foundation and American Society of Nephrology (ASN) Task Force recommended calculation based on the Chronic Kidney Disease Epidemiology Collaboration (CKD-EPI) equation refit without adjustment for race.       Narrative:      GFR Normal >60  Chronic Kidney Disease <60  Kidney Failure <15      CBC & Differential [[id number]]  (Abnormal) Collected: 05/31/22 0448    Specimen: Blood Updated: 05/31/22 0516    Narrative:      The following orders were created for panel order CBC & Differential.  Procedure                               Abnormality         Status                     ---------                               -----------         ------                     CBC Auto Differential[[id number]]        Abnormal            Final result                 Please view results for these tests on the individual orders.    CBC Auto Differential [[id number]]  (Abnormal) Collected: 05/31/22 0448    Specimen: Blood Updated: 05/31/22 0516     WBC 5.55 10*3/mm3      RBC 3.55 10*6/mm3      Hemoglobin 10.9 g/dL       Hematocrit 33.3 %      MCV 93.8 fL      MCH 30.7 pg      MCHC 32.7 g/dL      RDW 17.7 %      RDW-SD 58.7 fl      MPV 10.0 fL      Platelets 222 10*3/mm3      Neutrophil % 68.1 %      Lymphocyte % 18.9 %      Monocyte % 9.2 %      Eosinophil % 2.5 %      Basophil % 0.4 %      Immature Grans % 0.9 %      Neutrophils, Absolute 3.78 10*3/mm3      Lymphocytes, Absolute 1.05 10*3/mm3      Monocytes, Absolute 0.51 10*3/mm3      Eosinophils, Absolute 0.14 10*3/mm3      Basophils, Absolute 0.02 10*3/mm3      Immature Grans, Absolute 0.05 10*3/mm3      nRBC 0.2 /100 WBC     Potassium [[id number]]  (Normal) Collected: 05/30/22 1848    Specimen: Blood Updated: 05/30/22 1929     Potassium 4.4 mmol/L      Comment: Slight hemolysis detected by analyzer. Results may be affected.       Basic Metabolic Panel [[id number]]  (Abnormal) Collected: 05/30/22 0456    Specimen: Blood Updated: 05/30/22 0531     Glucose 87 mg/dL      BUN 12 mg/dL      Creatinine 0.72 mg/dL      Sodium 139 mmol/L      Potassium 3.2 mmol/L      Comment: Slight hemolysis detected by analyzer. Results may be affected.        Chloride 106 mmol/L      CO2 22.0 mmol/L      Calcium 7.2 mg/dL      BUN/Creatinine Ratio 16.7     Anion Gap 11.0 mmol/L      eGFR 95.3 mL/min/1.73      Comment: National Kidney Foundation and American Society of Nephrology (ASN) Task Force recommended calculation based on the Chronic Kidney Disease Epidemiology Collaboration (CKD-EPI) equation refit without adjustment for race.       Narrative:      GFR Normal >60  Chronic Kidney Disease <60  Kidney Failure <15      CBC (No Diff) [[id number]]  (Abnormal) Collected: 05/30/22 0456    Specimen: Blood Updated: 05/30/22 0504     WBC 4.77 10*3/mm3      RBC 3.61 10*6/mm3      Hemoglobin 11.3 g/dL      Hematocrit 34.4 %      MCV 95.3 fL      MCH 31.3 pg      MCHC 32.8 g/dL      RDW 17.7 %      RDW-SD 58.2 fl      MPV 10.0 fL      Platelets 247 10*3/mm3     Urinalysis, Microscopic Only - Urine, Clean  Catch [[id number]]  (Abnormal) Collected: 05/29/22 1349    Specimen: Urine, Clean Catch Updated: 05/29/22 1406     RBC, UA 6-12 /HPF      WBC, UA 3-5 /HPF      Comment: Urine culture not indicated.        Bacteria, UA 1+ /HPF      Squamous Epithelial Cells, UA 0-2 /HPF      Hyaline Casts, UA 0-2 /LPF      Methodology Automated Microscopy    Urinalysis With Culture If Indicated - Urine, Clean Catch [[id number]]  (Abnormal) Collected: 05/29/22 1349    Specimen: Urine, Clean Catch Updated: 05/29/22 1406     Color, UA Yellow     Appearance, UA Clear     pH, UA 6.5     Specific Gravity, UA 1.009     Glucose, UA Negative     Ketones, UA 15 mg/dL (1+)     Bilirubin, UA Negative     Blood, UA Small (1+)     Protein, UA Negative     Leuk Esterase, UA Negative     Nitrite, UA Negative     Urobilinogen, UA 1.0 E.U./dL    Narrative:      In absence of clinical symptoms, the presence of pyuria, bacteria, and/or nitrites on the urinalysis result does not correlate with infection.    COVID PRE-OP / PRE-PROCEDURE SCREENING ORDER (NO ISOLATION) - Swab, Nasopharynx [[id number]]  (Normal) Collected: 05/29/22 1244    Specimen: Swab from Nasopharynx Updated: 05/29/22 1332    Narrative:      The following orders were created for panel order COVID PRE-OP / PRE-PROCEDURE SCREENING ORDER (NO ISOLATION) - Swab, Nasopharynx.  Procedure                               Abnormality         Status                     ---------                               -----------         ------                     COVID-19,[facility] IN-HOUSE...[[id number]]  Normal              Final result                 Please view results for these tests on the individual orders.    COVID-19,[facility]-HOU[facility] CEPHEID/OLEG NP SWAB IN TRANSPORT MEDIA 8-12 HR TAT - Swab, Nasopharynx [[id number]]  (Normal) Collected: 05/29/22 1244    Specimen: Swab from Nasopharynx Updated: 05/29/22 1332     COVID19 Not Detected    Narrative:      Fact sheet for providers:  https://www.fda.gov/media/870696/download     Fact sheet for patients: https://www.fda.gov/media/541805/download    Comprehensive Metabolic Panel [[id number]]  (Abnormal) Collected: 05/29/22 1240    Specimen: Blood Updated: 05/29/22 1318     Glucose 105 mg/dL      BUN 14 mg/dL      Creatinine 0.84 mg/dL      Sodium 140 mmol/L      Potassium 3.8 mmol/L      Chloride 104 mmol/L      CO2 25.0 mmol/L      Calcium 8.2 mg/dL      Total Protein 5.9 g/dL      Albumin 3.80 g/dL      ALT (SGPT) 22 U/L      AST (SGOT) 23 U/L      Alkaline Phosphatase 52 U/L      Total Bilirubin 0.6 mg/dL      Globulin 2.1 gm/dL      A/G Ratio 1.8 g/dL      BUN/Creatinine Ratio 16.7     Anion Gap 11.0 mmol/L      eGFR 79.2 mL/min/1.73      Comment: National Kidney Foundation and American Society of Nephrology (ASN) Task Force recommended calculation based on the Chronic Kidney Disease Epidemiology Collaboration (CKD-EPI) equation refit without adjustment for race.       Narrative:      GFR Normal >60  Chronic Kidney Disease <60  Kidney Failure <15      CBC & Differential [[id number]]  (Abnormal) Collected: 05/29/22 1240    Specimen: Blood Updated: 05/29/22 1304    Narrative:      The following orders were created for panel order CBC & Differential.  Procedure                               Abnormality         Status                     ---------                               -----------         ------                     CBC Auto Differential[[id number]]        Abnormal            Final result                 Please view results for these tests on the individual orders.    CBC Auto Differential [[id number]]  (Abnormal) Collected: 05/29/22 1240    Specimen: Blood Updated: 05/29/22 1304     WBC 5.47 10*3/mm3      RBC 3.93 10*6/mm3      Hemoglobin 12.2 g/dL      Hematocrit 35.4 %      MCV 90.1 fL      MCH 31.0 pg      MCHC 34.5 g/dL      RDW 17.7 %      RDW-SD 54.2 fl      MPV 10.5 fL      Platelets 283 10*3/mm3      Neutrophil % 72.1 %      Lymphocyte  "% 16.3 %      Monocyte % 9.0 %      Eosinophil % 1.6 %      Basophil % 0.5 %      Immature Grans % 0.5 %      Neutrophils, Absolute 3.94 10*3/mm3      Lymphocytes, Absolute 0.89 10*3/mm3      Monocytes, Absolute 0.49 10*3/mm3      Eosinophils, Absolute 0.09 10*3/mm3      Basophils, Absolute 0.03 10*3/mm3      Immature Grans, Absolute 0.03 10*3/mm3      nRBC 0.2 /100 WBC         /77 (BP Location: Right arm, Patient Position: Lying)   Pulse 88   Temp 97.9 °F (36.6 °C) (Oral)   Resp 18   Ht 157.5 cm (62\")   Wt 88.5 kg (195 lb)   LMP  (LMP Unknown)   SpO2 96%   BMI 35.67 kg/m²     Discharge Exam:  General Appearance:    Alert, cooperative, no distress                          Head:    Normocephalic, without obvious abnormality, atraumatic                          Eyes:                            Throat:   Lips, tongue, gums normal                          Neck:   Supple, symmetrical, trachea midline, no JVD                        Lungs:     Clear to auscultation bilaterally, respirations unlabored                Chest Wall:    No tenderness or deformity                        Heart:    Regular rate and rhythm, S1 and S2 normal, no murmur,no  Rub  or gallop                  Abdomen:     Soft, non-tender, bowel sounds active, no masses, no organomegaly                  Extremities:   Extremities normal, atraumatic, no cyanosis or edema                             Skin:   Skin is warm and dry,  no rashes or palpable lesions                  Neurologic:   no focal deficits noted     Disposition:  Home    Patient Instructions:      Discharge Medications      Continue These Medications      Instructions Start Date   acetaminophen 500 MG tablet  Commonly known as: TYLENOL   500 mg, Oral, Every 6 Hours PRN      ALPRAZolam 0.25 MG tablet  Commonly known as: Xanax   0.25 mg, Oral, 2 Times Daily PRN      apixaban 5 MG tablet tablet  Commonly known as: Eliquis   5 mg, Oral, Every 12 Hours      azelastine 0.1 % nasal " spray  Commonly known as: ASTELIN   2 sprays, Nasal, 2 Times Daily, Use in each nostril as directed      calcium carbonate 600 MG tablet  Commonly known as: OS-CARY   600 mg, Oral, 2 Times Daily With Meals      capecitabine 500 MG chemo tablet  Commonly known as: Xeloda   Take 2 tablets (1000 mg) by mouth in the morning, and 3 tablets (1500 mg) in the evening. Take for 7 days on, followed by 7 days off.      cholecalciferol 25 MCG (1000 UT) tablet  Commonly known as: VITAMIN D3   1,000 Units, Oral, Daily      Cyanocobalamin ER 1000 MCG tablet controlled-release   1 tablet, Oral, Daily      Diclofenac Sodium 1 % gel gel  Commonly known as: VOLTAREN   4 g, Transdermal      diphenoxylate-atropine 2.5-0.025 MG per tablet  Commonly known as: LOMOTIL   1 tablet, Oral, 4 Times Daily PRN      DULoxetine 30 MG capsule  Commonly known as: CYMBALTA   TAKE 1 CAPSULE BY MOUTH  TWICE DAILY      Flonase Allergy Relief 50 MCG/ACT nasal spray  Generic drug: fluticasone   2 sprays, Nasal, Daily      gabapentin 300 MG capsule  Commonly known as: NEURONTIN   300 mg, Oral, 2 Times Daily      Hylan 16 MG/2ML solution prefilled syringe injection  Commonly known as: SYNVISC   16 mg, Intra-articular, As Needed      losartan 50 MG tablet  Commonly known as: COZAAR   50 mg, Oral, Daily      mesalamine 1.2 g EC tablet  Commonly known as: LIALDA   TAKE 1 TABLET BY MOUTH  TWICE DAILY      metaxalone 800 MG tablet  Commonly known as: Skelaxin   800 mg, Oral, 3 Times Daily PRN      modafinil 200 MG tablet  Commonly known as: Provigil   200 mg, Oral, Daily      Multivitamin Gummies Womens chewable tablet   1 tablet, Oral, Daily      nitrofurantoin (macrocrystal-monohydrate) 100 MG capsule  Commonly known as: Macrobid   100 mg, Oral, 2 Times Daily      omeprazole 40 MG capsule  Commonly known as: priLOSEC   TAKE 1 CAPSULE DAILY      ondansetron ODT 8 MG disintegrating tablet  Commonly known as: ZOFRAN-ODT   8 mg, Translingual, Every 8 Hours PRN       phenazopyridine 200 MG tablet  Commonly known as: Pyridium   200 mg, Oral, 3 Times Daily PRN      prochlorperazine 10 MG tablet  Commonly known as: COMPAZINE   10 mg, Oral, Every 6 Hours PRN      pseudoephedrine 30 MG tablet  Commonly known as: Sudafed   30 mg, Oral, Every 6 Hours PRN      sennosides-docusate 8.6-50 MG per tablet  Commonly known as: PERICOLACE   2 tablets, Oral, 2 Times Daily      sucralfate 1 GM/10ML suspension  Commonly known as: Carafate   1 g, Oral, 4 Times Daily      temazepam 15 MG capsule  Commonly known as: RESTORIL   15 mg, Oral, Nightly PRN      traMADol 50 MG tablet  Commonly known as: ULTRAM   TAKE 1 TABLET BY MOUTH  EVERY 8 HOURS AS NEEDED FOR MODERATE PAIN      triamcinolone 0.1 % cream  Commonly known as: KENALOG   Topical, 2 Times Daily      trimethoprim 100 MG tablet  Commonly known as: TRIMPEX   100 mg, Oral, Daily      urea 10 % cream  Commonly known as: CARMOL   Topical, 3 Times Daily      V-R MAGNESIUM CITRATE 1.745 GM/30ML solution solution  Generic drug: magnesium citrate   No dose, route, or frequency recorded.      Wh Petrol-Mineral Oil-Lanolin 0.1-0.1 % ointment   Ophthalmic           Future Appointments   Date Time Provider Department Center   6/3/2022  9:00 AM She Brunner APRN [facility] PSY ONC None   6/6/2022  7:50 AM LAB CHAIR 1 [facility]   6/6/2022  8:20 AM She Sears APRN [facility]   6/6/2022  8:45 AM INJECTION CHAIR [facility] INFUS EP LAG   6/6/2022  9:00 AM SPECIALTY PHARMACY [facility] INFUS EP [facility]   6/7/2022  2:00 PM She Brunner APRN [facility] PSY ONC None   6/14/2022 11:15 AM Jazmine Chanel MD [facility] PC [facility]   7/11/2022  7:30 AM LAB CHAIR 5 [facility]  LAB [facility] [place]   7/11/2022  8:00 AM Ubaldo Hancock Jr., MD [facility] [place]   7/11/2022  9:00 AM INJECTION CHAIR CBC KRE  INFUS KRE LAG   8/2/2022  8:00 AM SHARON NM ADMIN RM 1  SHARON NM SH[facility]   8/2/2022  9:15 AM SHARON CT 2  SHARON CT [facility]    8/2/2022 11:00 AM SHARON NM 2 BH SHARON NM [facility]   8/8/2022  7:50 AM LAB CHAIR 1 CBC LAB [facility] LAG [facility] [place]   8/8/2022  8:20 AM Ubaldo Hancock Jr., MD [facility] CBC EAST [place]   8/8/2022  9:00 AM INJECTION CHAIR [facility] BH INFUS EP LAG      Follow-up Information     Shahbaz Henriquez MD. Schedule an appointment as soon as possible for a visit in 2 week(s).    Specialty: Gastroenterology  Contact information:  [address]  [address]  [facility] [id number] [phone]             Jazmine Chanel MD Follow up in 1 week(s).    Specialty: Internal Medicine  Contact information:  [address]  [address]  [facility] [id number] [phone]                       Discharge Order (From admission, onward)     Start     Ordered    06/01/22 1149  Discharge patient  Once        Expected Discharge Date: 06/01/22    Discharge Disposition: Home or Self Care    Physician of Record for Attribution - Please select from Treatment Team: VIVI QUEVEDO [[id number]]    Review needed by CMO to determine Physician of Record: No       Question Answer Comment   Physician of Record for Attribution - Please select from Treatment Team VIVI QUEVEDO    Review needed by CMO to determine Physician of Record No        06/01/22 1151                Total time spent discharging patient including evaluation,post hospitalization follow up,  medication and post hospitalization instructions and education total time exceeds 30 minutes.    Signed:  Vivi Quevedo MD  6/1/2022  11:52 EDT

## 2022-06-02 ENCOUNTER — TRANSITIONAL CARE MANAGEMENT TELEPHONE ENCOUNTER (OUTPATIENT)
Dept: CALL CENTER | Facility: HOSPITAL | Age: 62
End: 2022-06-02

## 2022-06-02 ENCOUNTER — SPECIALTY PHARMACY (OUTPATIENT)
Dept: PHARMACY | Facility: HOSPITAL | Age: 62
End: 2022-06-02

## 2022-06-02 DIAGNOSIS — R19.8 ABNORMAL FINDINGS ON ESOPHAGOGASTRODUODENOSCOPY (EGD): Primary | ICD-10-CM

## 2022-06-02 NOTE — PROGRESS NOTES
Specialty Pharmacy Refill Coordination Note     Martha is a 61 y.o. female contacted today regarding refills of  Xeloda specialty medication(s).    Reviewed and verified with patient:         Specialty medication(s) and dose(s) confirmed: yes    Refill Questions    Flowsheet Row Most Recent Value   Changes to allergies? No   Changes to medications? Yes  [took nothing at all due to a stomach virus last week]   New conditions since last clinic visit Yes  [stomach virus last week]   Unplanned office visit, urgent care, ED, or hospital admission in the last 4 weeks  Yes  [in hospital for a stomach virus where she couldn't keep anything down.]   How does patient/caregiver feel medication is working? Good   Financial problems or insurance changes  No   Since the previous refill, were any specialty medication doses or scheduled injections missed or delayed?  Yes   If yes, please provide the amount a week plus an additional week per Dr Hancock   Why were doses missed? a stomach virus   Does this patient require a clinical escalation to a pharmacist? No  [MD aware]          Delivery Questions    Flowsheet Row Most Recent Value   Delivery method [employer]  [[employer] priority sig required ring doorbell (dogs) ship 6/9 deliver 6/10]   Delivery address correct? Yes   Preferred delivery time? AM   Number of medications in delivery 1   Medication being filled and delivered Xeloda   Doses left of specialty medications 1 & ½ weeks   Is there any medication that is due not being filled? No   Cooler needed? No   Do any medications need mixed or dated? No   Copay form of payment Payment plan already set up   Questions or concerns for the pharmacist? No   Are any medications first time fills? No                 Follow-up: 3 week(s)     Ami Hudson  Specialty Pharmacy Technician

## 2022-06-02 NOTE — OUTREACH NOTE
Call Center TCM Note    Flowsheet Row Responses   [facility] patient discharged from? [place]   Does the patient have one of the following disease processes/diagnoses(primary or secondary)? Other   TCM attempt successful? Yes  [No current verbal release on file from PCP office]   Call start time 1436   Call end time 1442   Discharge diagnosis Nausea & vomiting oral chemotherapy for metastatic breast cancer    Meds reviewed with patient/caregiver? Yes   Is the patient having any side effects they believe may be caused by any medication additions or changes? No   Does the patient have all medications ordered at discharge? Yes   Is the patient taking all medications as directed (includes completed medication regime)? Yes   Does the patient have a primary care provider?  Yes   Does the patient have an appointment with their PCP within 7 days of discharge? Greater than 7 days   Comments regarding PCP Patient had previous appt. scheduled for 6/14/22. No earlier appt. available   What is preventing the patient from scheduling follow up appointments within 7 days of discharge? Earlier appointment not available   Nursing Interventions Verified appointment date/time/provider   Has the patient kept scheduled appointments due by today? N/A   Has home health visited the patient within 72 hours of discharge? N/A   Psychosocial issues? No   Did the patient receive a copy of their discharge instructions? Yes   Nursing interventions Reviewed instructions with patient   What is the patient's perception of their health status since discharge? Improving   Is the patient/caregiver able to teach back signs and symptoms related to disease process for when to call PCP? Yes   Is the patient/caregiver able to teach back signs and symptoms related to disease process for when to call 911? Yes   Is the patient/caregiver able to teach back the hierarchy of who to call/visit for symptoms/problems? PCP, Specialist, Home health nurse,  Urgent Care, ED, 911 Yes   If the patient is a current smoker, are they able to teach back resources for cessation? Not a smoker   TCM call completed? Yes          Rajani Pedersen RN    6/2/2022, 14:44 EDT

## 2022-06-02 NOTE — OUTREACH NOTE
Prep Survey    Flowsheet Row Responses   [facility] patient discharged from? [place]   Is LACE score < 7 ? No   Emergency Room discharge w/ pulse ox? No   Eligibility [facility]   Date of Admission 05/29/22   Date of Discharge 06/01/22   Discharge Disposition Home or Self Care   Discharge diagnosis Nausea & vomiting oral chemotherapy for metastatic breast cancer    Does the patient have one of the following disease processes/diagnoses(primary or secondary)? Other   Does the patient have Home health ordered? No   Is there a DME ordered? No   Prep survey completed? Yes          ROQUE XIONG - Registered Nurse

## 2022-06-03 ENCOUNTER — OFFICE VISIT (OUTPATIENT)
Dept: PSYCHIATRY | Facility: HOSPITAL | Age: 62
End: 2022-06-03

## 2022-06-03 DIAGNOSIS — R53.0 NEOPLASTIC MALIGNANT RELATED FATIGUE: ICD-10-CM

## 2022-06-03 DIAGNOSIS — F41.1 GENERALIZED ANXIETY DISORDER: Primary | ICD-10-CM

## 2022-06-03 DIAGNOSIS — C50.919 METASTATIC BREAST CANCER: ICD-10-CM

## 2022-06-03 PROCEDURE — 99214 OFFICE O/P EST MOD 30 MIN: CPT | Performed by: NURSE PRACTITIONER

## 2022-06-03 NOTE — PROGRESS NOTES
[facility]  In Person Session    Subjective  Patient ID: Martha Davey is a 61 y.o. female is seen face to face in the [facility] ([facility].    CC: Distress    HPI:   Physically, pt is feeling well today although acknowledges challenging GI bug with dehydration requiring admission. Current break from Xeloda for metastatic breast cancer to assist with recovery. Pt reports to be doing well, despite significant distress related to 's concurrent diagnosis and continued challenges in family dynamics.   Sleep remains disrupted at times, appx twice weekly. Finds temazepam is helpful most days. Continues gabapentin 300 mg q dinner and 300 mg q hs which also assists. Continues to get up and engaged during day. Generally reports appropriate energy outside of recent health disruption/ admission. Pt continues to endorse interpersonal sensitivity, challenges managing various relationships. Acknowledges importance of counseling, although has not been able to prioritize due to various family demands.    Objective   Mental Status Exam  Appearance:  clean and casually dressed, appropriate  Attitude toward clinician:  cooperative and agreeable   Speech:    Rate:  regular rate and rhythm   Volume:  normal  Motor:  no abnormal movements present  Mood:  Demoralized  Affect:  mood congruent  Thought Processes:  linear, logical, and goal directed  Thought Content:  normal  Suicidal Thoughts:  absent  Homicidal Thoughts:  absent  Perceptual Disturbance: no perceptual disturbance  Attention and Concentration:  good  Insight and Judgement:  fair  Memory:  memory appears to be intact    Review of Systems   Constitutional: Negative for fatigue.   Psychiatric/Behavioral: Positive for sleep disturbance. The patient is nervous/anxious.      Medications Reviewed:  Provigil 1/2 of 200 mg tab daily  Duloxetine 30 mg bid  Temazepam  Gabapentin 300 mg q dinner and 300 mg q hs    Diagnoses and all orders  for this visit:    1. Generalized anxiety disorder (Primary)    2. Neoplastic malignant related fatigue    3. Metastatic breast cancer (HCC)      Plan of Care  Recommend continuing medications as written.  Pt has disconnected from therapist, although recognizes the importance of continuing this relationship, specifically noting continued errors in mind reading, not yet having read chapter 3 of Feeling Good. Supported pt in reconnected; reading assignment given to assist given understandable challenges related to schedule.  Supported pt in prioritization of self care, boundaries and limitations of ability to provide for others.  Follow up arranged in 8 weeks.    I spent 32 minutes caring for Martha on this date of service. This time includes time spent by me in the following activities: preparing for the visit, obtaining and/or reviewing a separately obtained history, performing a medically appropriate examination and/or evaluation, counseling and educating the patient/family/caregiver, ordering medications, tests, or procedures and documenting information in the medical record.

## 2022-06-06 ENCOUNTER — LAB (OUTPATIENT)
Dept: OTHER | Facility: HOSPITAL | Age: 62
End: 2022-06-06

## 2022-06-06 ENCOUNTER — OFFICE VISIT (OUTPATIENT)
Dept: ONCOLOGY | Facility: CLINIC | Age: 62
End: 2022-06-06

## 2022-06-06 ENCOUNTER — INFUSION (OUTPATIENT)
Dept: ONCOLOGY | Facility: HOSPITAL | Age: 62
End: 2022-06-06

## 2022-06-06 ENCOUNTER — SPECIALTY PHARMACY (OUTPATIENT)
Dept: ONCOLOGY | Facility: HOSPITAL | Age: 62
End: 2022-06-06

## 2022-06-06 VITALS
HEIGHT: 62 IN | TEMPERATURE: 97.9 F | DIASTOLIC BLOOD PRESSURE: 75 MMHG | HEART RATE: 96 BPM | OXYGEN SATURATION: 97 % | BODY MASS INDEX: 34.45 KG/M2 | RESPIRATION RATE: 18 BRPM | WEIGHT: 187.2 LBS | SYSTOLIC BLOOD PRESSURE: 122 MMHG

## 2022-06-06 VITALS — SYSTOLIC BLOOD PRESSURE: 114 MMHG | DIASTOLIC BLOOD PRESSURE: 74 MMHG | HEART RATE: 83 BPM

## 2022-06-06 DIAGNOSIS — C50.811 MALIGNANT NEOPLASM OF OVERLAPPING SITES OF RIGHT BREAST IN FEMALE, ESTROGEN RECEPTOR NEGATIVE: Primary | ICD-10-CM

## 2022-06-06 DIAGNOSIS — T45.4X5A ADVERSE EFFECT OF IRON, INITIAL ENCOUNTER: ICD-10-CM

## 2022-06-06 DIAGNOSIS — C50.811 MALIGNANT NEOPLASM OF OVERLAPPING SITES OF RIGHT BREAST IN FEMALE, ESTROGEN RECEPTOR NEGATIVE: ICD-10-CM

## 2022-06-06 DIAGNOSIS — K21.9 GASTROESOPHAGEAL REFLUX DISEASE, UNSPECIFIED WHETHER ESOPHAGITIS PRESENT: ICD-10-CM

## 2022-06-06 DIAGNOSIS — C79.51 BONE METASTASES: ICD-10-CM

## 2022-06-06 DIAGNOSIS — D50.0 IRON DEFICIENCY ANEMIA DUE TO CHRONIC BLOOD LOSS: ICD-10-CM

## 2022-06-06 DIAGNOSIS — Z17.1 MALIGNANT NEOPLASM OF OVERLAPPING SITES OF RIGHT BREAST IN FEMALE, ESTROGEN RECEPTOR NEGATIVE: ICD-10-CM

## 2022-06-06 DIAGNOSIS — C50.919 METASTATIC BREAST CANCER: ICD-10-CM

## 2022-06-06 DIAGNOSIS — Z17.1 MALIGNANT NEOPLASM OF OVERLAPPING SITES OF RIGHT BREAST IN FEMALE, ESTROGEN RECEPTOR NEGATIVE: Primary | ICD-10-CM

## 2022-06-06 DIAGNOSIS — Z79.899 HIGH RISK MEDICATION USE: ICD-10-CM

## 2022-06-06 DIAGNOSIS — K21.9 GASTROESOPHAGEAL REFLUX DISEASE, UNSPECIFIED WHETHER ESOPHAGITIS PRESENT: Primary | ICD-10-CM

## 2022-06-06 DIAGNOSIS — D50.9 IRON DEFICIENCY ANEMIA, UNSPECIFIED IRON DEFICIENCY ANEMIA TYPE: Primary | ICD-10-CM

## 2022-06-06 LAB
ALBUMIN SERPL-MCNC: 3.7 G/DL (ref 3.5–5.2)
ALBUMIN/GLOB SERPL: 1.5 G/DL
ALP SERPL-CCNC: 53 U/L (ref 39–117)
ALT SERPL W P-5'-P-CCNC: 18 U/L (ref 1–33)
ANION GAP SERPL CALCULATED.3IONS-SCNC: 7.8 MMOL/L (ref 5–15)
AST SERPL-CCNC: 22 U/L (ref 1–32)
BASOPHILS # BLD AUTO: 0.07 10*3/MM3 (ref 0–0.2)
BASOPHILS NFR BLD AUTO: 1.8 % (ref 0–1.5)
BILIRUB SERPL-MCNC: 0.3 MG/DL (ref 0–1.2)
BUN SERPL-MCNC: 19 MG/DL (ref 8–23)
BUN/CREAT SERPL: 19.8 (ref 7–25)
CALCIUM SPEC-SCNC: 10.3 MG/DL (ref 8.6–10.5)
CHLORIDE SERPL-SCNC: 98 MMOL/L (ref 98–107)
CO2 SERPL-SCNC: 34.2 MMOL/L (ref 22–29)
CREAT SERPL-MCNC: 0.96 MG/DL (ref 0.57–1)
DEPRECATED RDW RBC AUTO: 61.7 FL (ref 37–54)
EGFRCR SERPLBLD CKD-EPI 2021: 67.5 ML/MIN/1.73
EOSINOPHIL # BLD AUTO: 0.34 10*3/MM3 (ref 0–0.4)
EOSINOPHIL NFR BLD AUTO: 8.5 % (ref 0.3–6.2)
ERYTHROCYTE [DISTWIDTH] IN BLOOD BY AUTOMATED COUNT: 17.7 % (ref 12.3–15.4)
FERRITIN SERPL-MCNC: 41.6 NG/ML (ref 13–150)
GLOBULIN UR ELPH-MCNC: 2.4 GM/DL
GLUCOSE SERPL-MCNC: 116 MG/DL (ref 65–99)
HCT VFR BLD AUTO: 36 % (ref 34–46.6)
HGB BLD-MCNC: 11.4 G/DL (ref 12–15.9)
IMM GRANULOCYTES # BLD AUTO: 0.01 10*3/MM3 (ref 0–0.05)
IMM GRANULOCYTES NFR BLD AUTO: 0.3 % (ref 0–0.5)
IRON 24H UR-MRATE: 52 MCG/DL (ref 37–145)
IRON SATN MFR SERPL: 14 % (ref 20–50)
LYMPHOCYTES # BLD AUTO: 1.11 10*3/MM3 (ref 0.7–3.1)
LYMPHOCYTES NFR BLD AUTO: 27.8 % (ref 19.6–45.3)
MAGNESIUM SERPL-MCNC: 1.9 MG/DL (ref 1.6–2.4)
MCH RBC QN AUTO: 30.6 PG (ref 26.6–33)
MCHC RBC AUTO-ENTMCNC: 31.7 G/DL (ref 31.5–35.7)
MCV RBC AUTO: 96.8 FL (ref 79–97)
MONOCYTES # BLD AUTO: 0.66 10*3/MM3 (ref 0.1–0.9)
MONOCYTES NFR BLD AUTO: 16.5 % (ref 5–12)
NEUTROPHILS NFR BLD AUTO: 1.8 10*3/MM3 (ref 1.7–7)
NEUTROPHILS NFR BLD AUTO: 45.1 % (ref 42.7–76)
NRBC BLD AUTO-RTO: 0 /100 WBC (ref 0–0.2)
PHOSPHATE SERPL-MCNC: 5.2 MG/DL (ref 2.5–4.5)
PLAT MORPH BLD: NORMAL
PLATELET # BLD AUTO: 219 10*3/MM3 (ref 140–450)
PMV BLD AUTO: 10.9 FL (ref 6–12)
POTASSIUM SERPL-SCNC: 4.6 MMOL/L (ref 3.5–5.2)
PROT SERPL-MCNC: 6.1 G/DL (ref 6–8.5)
RBC # BLD AUTO: 3.72 10*6/MM3 (ref 3.77–5.28)
RBC MORPH BLD: NORMAL
SODIUM SERPL-SCNC: 140 MMOL/L (ref 136–145)
TIBC SERPL-MCNC: 384 MCG/DL (ref 298–536)
TRANSFERRIN SERPL-MCNC: 258 MG/DL (ref 200–360)
WBC MORPH BLD: NORMAL
WBC NRBC COR # BLD: 3.99 10*3/MM3 (ref 3.4–10.8)

## 2022-06-06 PROCEDURE — 85007 BL SMEAR W/DIFF WBC COUNT: CPT | Performed by: INTERNAL MEDICINE

## 2022-06-06 PROCEDURE — 96365 THER/PROPH/DIAG IV INF INIT: CPT

## 2022-06-06 PROCEDURE — 25010000002 DENOSUMAB 120 MG/1.7ML SOLUTION: Performed by: NURSE PRACTITIONER

## 2022-06-06 PROCEDURE — 83735 ASSAY OF MAGNESIUM: CPT | Performed by: INTERNAL MEDICINE

## 2022-06-06 PROCEDURE — 85025 COMPLETE CBC W/AUTO DIFF WBC: CPT | Performed by: INTERNAL MEDICINE

## 2022-06-06 PROCEDURE — 63710000001 DIPHENHYDRAMINE PER 50 MG: Performed by: NURSE PRACTITIONER

## 2022-06-06 PROCEDURE — 83540 ASSAY OF IRON: CPT | Performed by: INTERNAL MEDICINE

## 2022-06-06 PROCEDURE — 99215 OFFICE O/P EST HI 40 MIN: CPT | Performed by: NURSE PRACTITIONER

## 2022-06-06 PROCEDURE — 25010000002 IRON SUCROSE PER 1 MG: Performed by: NURSE PRACTITIONER

## 2022-06-06 PROCEDURE — 84466 ASSAY OF TRANSFERRIN: CPT | Performed by: INTERNAL MEDICINE

## 2022-06-06 PROCEDURE — A9270 NON-COVERED ITEM OR SERVICE: HCPCS | Performed by: NURSE PRACTITIONER

## 2022-06-06 PROCEDURE — 84100 ASSAY OF PHOSPHORUS: CPT | Performed by: INTERNAL MEDICINE

## 2022-06-06 PROCEDURE — 63710000001 ACETAMINOPHEN 325 MG TABLET: Performed by: NURSE PRACTITIONER

## 2022-06-06 PROCEDURE — 96372 THER/PROPH/DIAG INJ SC/IM: CPT

## 2022-06-06 PROCEDURE — 82728 ASSAY OF FERRITIN: CPT | Performed by: INTERNAL MEDICINE

## 2022-06-06 PROCEDURE — 36415 COLL VENOUS BLD VENIPUNCTURE: CPT

## 2022-06-06 PROCEDURE — 80053 COMPREHEN METABOLIC PANEL: CPT | Performed by: INTERNAL MEDICINE

## 2022-06-06 RX ORDER — SODIUM CHLORIDE 9 MG/ML
250 INJECTION, SOLUTION INTRAVENOUS ONCE
Status: COMPLETED | OUTPATIENT
Start: 2022-06-06 | End: 2022-06-06

## 2022-06-06 RX ORDER — SODIUM, POTASSIUM,MAG SULFATES 17.5-3.13G
SOLUTION, RECONSTITUTED, ORAL ORAL
COMMUNITY
Start: 2022-04-27 | End: 2022-06-06

## 2022-06-06 RX ORDER — PANTOPRAZOLE SODIUM 40 MG/1
40 TABLET, DELAYED RELEASE ORAL DAILY
Qty: 90 TABLET | Refills: 2 | Status: SHIPPED | OUTPATIENT
Start: 2022-06-06

## 2022-06-06 RX ORDER — DIPHENHYDRAMINE HCL 25 MG
25 CAPSULE ORAL ONCE
Status: COMPLETED | OUTPATIENT
Start: 2022-06-06 | End: 2022-06-06

## 2022-06-06 RX ORDER — ACETAMINOPHEN 325 MG/1
650 TABLET ORAL ONCE
Status: COMPLETED | OUTPATIENT
Start: 2022-06-06 | End: 2022-06-06

## 2022-06-06 RX ADMIN — DIPHENHYDRAMINE HYDROCHLORIDE 25 MG: 25 CAPSULE ORAL at 10:03

## 2022-06-06 RX ADMIN — DENOSUMAB 120 MG: 120 INJECTION SUBCUTANEOUS at 10:04

## 2022-06-06 RX ADMIN — ACETAMINOPHEN 650 MG: 325 TABLET ORAL at 10:03

## 2022-06-06 RX ADMIN — IRON SUCROSE 300 MG: 20 INJECTION, SOLUTION INTRAVENOUS at 10:39

## 2022-06-06 RX ADMIN — SODIUM CHLORIDE 250 ML: 9 INJECTION, SOLUTION INTRAVENOUS at 10:39

## 2022-06-06 NOTE — PROGRESS NOTES
MTM Encounter-Re: Adherence and side effects (Xeloda)    Today's encounter was conducted via telemedicine.    Medication:  Xeloda 1000 mg in the morning, 1500 mg in the evening for 7 days on, 7 days off   - Reason for outreach: Routine medication check-in .  - Administration: Patient is taking every day at the same time , with food , twice daily and as prescribed .  - Missed doses: Patient reports missing a few doses the last week she was on xeloda. She reports being recently hospitalized w/gastritis for about 3 days and was not feeling well, hence the missed doses. She has plans to follow-up with GI. She typically does not miss doses. She reports feeling much better, aside from fatigue. She plans to start her next on week of Xeloda tomorrow.   - Self-administration: Patient demonstrates ability to self-administer medication. No barriers to adherence identified.   - Diagnosis/Indication: breast cancer. Progress toward achieving therapeutic goals reviewed.   - Patient denies side effects, aside from some loose stools when she first starts each week of Xeloda, which is manageable and not bothersome to patient. Advised patient to alert office if this changes.    - Medication availability/affordability: Patient has had no issues obtaining medication from pharmacy.   - Questions/concerns about medications: n/a       Completed medication reconciliation today to assess for drug interactions.   Reviewed allergies, medical history, labs, quality of life, and medication history with patient.   Patient has had the following changes to medication list: there are bowel prep items on the list that were completed; patient's chart has been updated to reflect changes. Assessed medication list for interactions, no significant drug interactions noted, aside from previously noted PPI/xeloda interaction and benefit of continuation outweighs risk.   Advised pt to call the clinic if any new medications are started so we can assess for  drug-drug interactions.     All questions addressed. Patient had no additional concerns for MTM office.     Elizabeth Schafer, Pharmacist  6/6/2022  08:54 EDT

## 2022-06-06 NOTE — PROGRESS NOTES
"Chief Complaint  Previous Stage Ib ([id number]) ER/TX positive, HER-2/peter negative right breast cancer with subsequent metastatic disease identified 10/8/2017, history of right pulmonary embolism, cancer related pain, chemotherapy-induced diarrhea, chemotherapy-induced mucositis, chemotherapy-induced hand-foot syndrome, iron deficiency anemia    Subjective        History of Present Illness   Patient is seen back today in follow-up, due for monthly Xgeva.  Unfortunately the patient was recently hospitalized briefly from 5/29 to 6/1/2022 with intractable nausea and vomiting, ultimately diagnosed with viral gastroenteritis.  She did undergo EGD showing gastritis, pathology negative for metastatic disease or H. pylori.  As she is reviewed back today, patient states that she actually stopped her omeprazole a long time ago and we discussed this is important to be on.  Her insurance requires that she actually be on Protonix instead and we discussed prescribing this today for her to continue.      Patient continues to struggle with significant fatigue.  Notably last month she was found to be iron deficient and states that she did attempt to take oral iron but could not tolerate it with GI upset.  We discussed therefore repeating new baseline iron studies today but also proceeding with IV iron for iron deficiency anemia and she is in agreement.    Patient feels otherwise recovered from recent viral illness and is ready to restart her Xeloda, specifically taking a dose of 1000 g in the morning, 1500 mg in the evening for 7 days on followed by 7 days off.  Having been off Xeloda an additional week her hands are somewhat improved though she has some degree always of sclerodactyly and erythema.  No significant peeling currently.    She denies other concerns at this time.    Objective   Vital Signs:   /75   Pulse 96   Temp 97.9 °F (36.6 °C) (Oral)   Resp 18   Ht 157.5 cm (62.01\")   Wt 84.9 kg (187 lb 3.2 oz)   SpO2 " 97%   BMI 34.23 kg/m²     Physical Exam  Constitutional:       Appearance: She is well-developed.   Eyes:      Conjunctiva/sclera: Conjunctivae normal.   Neck:      Thyroid: No thyromegaly.   Cardiovascular:      Rate and Rhythm: Normal rate and regular rhythm.      Heart sounds: No murmur heard.    No friction rub. No gallop.   Pulmonary:      Effort: No respiratory distress.      Breath sounds: Normal breath sounds.   Abdominal:      General: Bowel sounds are normal. There is no distension.      Palpations: Abdomen is soft.      Tenderness: There is no abdominal tenderness.   Skin:     General: Skin is warm and dry.      Findings: Rash present.      Comments: Significant overall improvement in palmar erythema and skin peeling with currently minimal to no erythema present.  The degree of sclerodactyly has decreased significantly and mobility in the fingers is improved.     Neurological:      Mental Status: She is alert and oriented to person, place, and time.      Cranial Nerves: No cranial nerve deficit.      Motor: No abnormal muscle tone.      Deep Tendon Reflexes: Reflexes normal.   Psychiatric:         Behavior: Behavior normal.        Result Review :   Results from last 7 days   Lab Units 06/06/22  0807 06/01/22  0456 05/31/22  0448   WBC 10*3/mm3 3.99 6.95 5.55   NEUTROS ABS 10*3/mm3 1.80 5.02 3.78   HEMOGLOBIN g/dL 11.4* 11.3* 10.9*   HEMATOCRIT % 36.0 34.2 33.3*   PLATELETS 10*3/mm3 219 230 222     Results from last 7 days   Lab Units 06/06/22  0807 06/01/22 0456 05/31/22  0448   SODIUM mmol/L 140 139 140   POTASSIUM mmol/L 4.6 3.2* 3.7   CHLORIDE mmol/L 98 106 107   CO2 mmol/L 34.2* 21.0* 22.0   BUN mg/dL 19 5* 6*   CREATININE mg/dL 0.96 0.62 0.57   CALCIUM mg/dL 10.3 7.0* 7.0*   ALBUMIN g/dL 3.70 3.30* 3.60   BILIRUBIN mg/dL 0.3 0.6 0.5   ALK PHOS U/L 53 47 44   ALT (SGPT) U/L 18 17 20   AST (SGOT) U/L 22 20 25   GLUCOSE mg/dL 116* 86 81   MAGNESIUM mg/dL 1.9  --   --    FERRITIN ng/mL 41.60  --   --     IRON mcg/dL 52  --   --    TIBC mcg/dL 384  --   --                 Assessment and Plan     1. Previous Stage Ib ([id number]) right breast cancer:  · Diagnosed May 2010 with excisional biopsy for invasive ductal carcinoma, 1.3 cm, grade 2, ER 90%, TN 80%, HER-2/peter negative (1+ IHC).    · Subsequent right mastectomy in July 2010 with no residual breast malignancy, 1/5 sentinel lymph node with micrometastasis (0.25 mm).    · Treated in the [facility] system with adjuvant AC ×4 cycles in 2010 (no taxanes administered due to underlying Charcot-Saloni-Tooth with peripheral neuropathy).    · Adjuvant Femara (postmenopausal) initiated October 2010 with plan to treat ×10 years.    · Genetic testing reportedly negative.    · Developed osteopenia treated with Prolia beginning 2/27/13. Subsequently discontinued due to identification of metastatic disease.  2. Recurrent/metastatic disease identified 10/8/17:  · Disease involving thoracic spine with cord compression at T6, lumbosacral involvement, sternal and right sternoclavicular involvement.    · Femara discontinued in 10/2017.    · Radiation administered (in the [facility] system) to the thoracic spine beginning 10/19/17 treating T3-T9 to a dose of 24 gray in 6 fractions.  · Evaluation with MRI 12/8/17 showing persistent T6 cord compression with persistent neurologic compromise requiring surgical treatment 12/11/17 with T6 laminectomy/corpectomy and T3-T9 fusion.  Pathology with metastatic carcinoma of breast origin, ER negative, TN negative, HER-2/peter negative (1+ IHC).  · Additional staging evaluation 12/8/17 with no evidence of visceral metastatic disease, bone scan showing involvement of thoracic spine, sternum, left humerus, mid frontal bone.  No plane film correlate of left humerus lesion.  MRI lumbar spine with small intradural L3 metastasis.  CA 15-3 12/6/17- 17.  · Palliative radiation therapy to L3 dural metastasis and left humerus initiated 1/15/18 (10 fractions),  completed 1/26/18.  · Hypercalcemia of malignancy with calcium in the 10-11 range.  · Initiation of monthly Xgeva 1/23/18.  · Baseline CT scan 1/30/18 with no evidence of visceral involvement.  Cluster of nodular opacities in the right lower lobe suspected to be infectious or related to bronchiolitis. Bone scan 1/30/18 showed postsurgical change in the thoracic spine, stable uptake in the frontal bone, no new areas of disease.  · Initiation of palliative oral single agent Xeloda 2/7/18 2000 mg a.m., 1500 mg p.m. for 14/21 days.   · Following 3 cycles xeloda, bone scan 4/4/18 showed no change from the prior study.  CT scan 4/4/18 showed a small pericardial effusion of unclear significance as well as a subcutaneous nodule in the right lateral chest wall.  Subsequent evaluation with echocardiogram 4/17/18 showed no evidence of pericardial effusion.  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  We discussed the possibility of surgical excision of the right subcutaneous chest wall lesion for more definitive diagnosis.  Reviewed previous CT images dating back to 12/8/17 and the lesion was present even at that time measuring around 1.7 cm although not commented on in the radiology report.  As this appears to be an indolent low-grade process unrelated to her breast cancer, recommendee foregoing surgical excision at this time and monitoring this area on future scans.  The patient agreed.    · Following 6 cycles of Xeloda, CT 6/6/18  showed stable findings, no evidence of progressive disease.  There was a comment regarding subcutaneous abnormality in the anterior abdominal wall and this was related to Lovenox injection sites.  Bone scan 6/6/18 showed no interval change.   · CT scan and bone scan 8/13/18 following 9 cycles of Xeloda showed stable findings with no evidence of  significant visceral metastases.  Her bone lesions appear stable on bone scan.  The spindle cell neoplasm in her right chest wall actually decreased in size from 2 cm down to 1.6 cm.    · The patient experienced some symptoms of diarrhea, anorexia, generalized weakness during cycle 9 Xeloda it was unclear whether this was related to a viral gastroenteritis or toxicity from treatment.  Symptoms recurred during cycle 10 and treatment was cut short by 2 days.  Symptoms attributed to Xeloda.  With cycle 11, dose and schedule altered to 1500 mg twice daily for 7 days on followed by 7 days off .  · CT scan 9/9/2020 with no significant changes.  Bone scan 9/9/2020 read as unchanged from prior studies however did note an area of slight activity in the medial left femur.  Contacted radiology and although this was not noted on prior reports appears to have been present.  Subsequent MRI left femur 9/21/2020 with cortical thickening and periosteal edema left iliac us muscle insertion to the medial left femur with no evidence of metastatic disease, favored to represent periosteal change secondary to insertional tendinitis.  · Severe hand-foot symptoms causing sclerodactyly and limitation in finger movement prompted change in dosing in July 2021 with Xeloda decreased to 1000 mg a.m., 1500 mg p.m. for 7 days on followed by 7 days off.  · Most recent interval scans from 3/28/2022 with CT chest abdomen pelvis, bone scan performed.  CT scans showed no significant change from prior study.  Bone scan showed stable findings, in particular no changes in the right ischio pubic ramus there was question on prior study.  · Patient was experiencing severe hand-foot syndrome related to Xeloda having developed skin peeling, sclerodactyly with limited use of her hands.  On 3/31/2022, Xeloda was held to allow for recovery.  Patient resumed Xeloda on 4/18/2022.  · Patient returns today in follow-up due for monthly Xgeva.  Patient briefly held  Xeloda in light of recent hospitalization for viral gastroenteritis.  She has therefore had an additional week off of therapy.  She is completely recovered from this however and we therefore discussed resuming Xeloda at previous dose of 1000 mg in the morning, 59 mg in the evening for 7 days on followed by 7 days off.  She will start this today.  She will also proceed with monthly Xgeva as scheduled.  Patient is having worsening fatigue, felt to be related to iron deficiency anemia (further discussed below) and we will begin IV iron with weekly visits for such.  She will otherwise see Dr. Hancock back in 5 weeks (1 week delayed due to the July 4 holiday) with next Xgeva.  She already has scans scheduled in August.  3. Right pulmonary embolism:  · Diagnosed on CT angiogram 10/21/17 involving small right lower lobe pulmonary artery.  Lower extremity Dopplers negative.  · Bilateral lower extremity Dopplers negative again 12/5/17.  · Received chronic Lovenox 1 mg/kg twice per day, transition to oral Eliquis in February 2019, continuing on Eliquis 5 mg twice daily.  · The patient has had no bleeding difficulties on anticoagulation.  4. Cancer related pain:  · Previously receiving Duragesic 50 µg patch every 72 hours along with Dilaudid 4 mg as needed for breakthrough pain  · The patient's pain improved over time and she was able to discontinue both Duragesic and Dilaudid in the interval.  · Patient does take occasional Flexeril at bedtime due to back spasm/pain when she has been more active.  · The patient does have some occasional aggravation of her chronic back pain and does use tramadol 50 mg every 8 hours as needed.  · Patient's pain is stable.  She does continue to use tramadol 50 mg every 8-12 hours as needed which has been effective.  The patient does have chronic difficulty with pain involving her low back, left shoulder.  Physical therapy has helped to produce some relief.  Pain has been  stable.  5. Chemotherapy-induced diarrhea with subsequent C. difficile colitis in the setting of previous ulcerative colitis:  · Patient with reported history of ulcerative colitis, continues on mesalamine.  · The patient developed initial diarrhea related to Xeloda at regular dosing.  · Symptoms improved on reduced dose Xeloda  · Flare of symptoms in October 2018 with apparent finding of C. difficile colitis by GI, treated with course of oral vancomycin with improvement in symptoms.  · Patient notes minimal intermittent diarrhea/loose stools on Xeloda requiring occasional dosing of Imodium.    · Patient reports that bowel function has been stable recently.  6. Traumatic left tibia/fibular fracture:  · Status post ORIF 12/6/17  · Specimen was sent for pathologic review, negative for evidence of malignancy  7. Hypercalcemia:  · Suspect hypercalcemia of malignancy, calcium in  10-11 range previously.  · Calcium normalized following initiation of monthly Xgeva on 1/23/18.  8. Chemotherapy-induced mucositis:  · Patient had a minimal degree of mucositis with cycle 2.  The patient has magic mouthwash to use as needed.  No subsequent mucositis.  9. Recurrent UTI, bladder wall thickening on CT:  · Patient had an enterococcal UTI on 3/2/18 sensitive to nitrofurantoin and received treatment, unclear how long.  · Recurrent UTI 3/20/18 with urine culture growing Klebsiella, initially treated with nitrofurantoin, transitioned to Levaquin.  · CT 4/4/18 with diffuse bladder wall thickening with increased nodular thickening at the left base.  Referral to urogynecology Dr. May Johnson.  She was placed on a prophylactic dose of trimethoprim 100 mg daily, bladder wall thickening felt to be related to recent recurrent urinary tract infections.  · Patient with urinary symptoms, treated with course of Macrobid at the end of December 2018, urine culture however was negative 12/31/18.  · Patient was found to have Klebsiella UTI 7/29/2019  which was successfully treated with Macrobid with complete resolution of symptoms.  · CT 8/10/2021 with diffuse bladder wall thickening new from 5/17/2021 (however seen on multiple prior scans).    · UTI on 10/18/2021 with E. coli greater than 100,000 colonies that was pansensitive, treated with Macrobid x7 days  · With ongoing/recurrent UTIs patient was seen by urogynecology and initiated suppressive therapy with trimethoprim 100 mg daily in December 2021.  She has not experienced any further urinary tract infections.  · CT abdomen pelvis 3/28/2022 does show diffuse thickening of urinary bladder as has been seen on prior studies indicative of cystitis.  · No recent urinary tract infections continuing on trimethoprim 100 mg daily.  10.   Mobility:  · The patient underwent an intensive course of [facility].  She graduated from her outpatient course November 2018.  · Overall the patient has improved dramatically in terms of mobility,   · Patient reports that she has not been walking for exercise as much recently and intends to get back to this.  She had been walking up to a mile daily.  11.  Depression:  · The patient is continuing follow-up in the supportive oncology clinic and is currently continuing on Cymbalta 30 mg twice daily as well as gabapentin 600 mg nightly.  · The patient feels that her depression symptoms are currently fairly well controlled.  She has seen some benefit from attending support groups at [facility] which she plans to continue.  · The patient does continue follow-up in supportive oncology clinic, last seen on 3/8/2022.  Symptoms remain under fair control although she does have quite a few stressors with her 's malignancy and chemotherapy as well as her autistic son who is living with them at home.  12. Hand-foot syndrome secondary to Xeloda:  · Patient continues with frequent application of emollient cream to the hands and feet  · Symptoms increased significantly  requiring a 1 week delay in cycle 18 Xeloda as noted above.  Symptoms did improve and she continued on the same dose.    · Patient was referred to dermatology and has been continuing on triamcinolone 0.1% cream used for 1 week on followed by 1 week off which led to some further improvement.   · Progressive palmar erythema with development of sclerodactyly and effect on dexterity.  Addition of urea-based cream 3 times daily.  · Patient with continued difficulty regarding erythema of her hands that extended onto the dorsal aspect and was causing contractures/sclerodactyly in her fingers affecting her dexterity.  In July 2021, held Xeloda for an additional week and then reduced dose from 1500 mg twice daily down to 1000 mg a.m. and 1500 mg p.m. and continued on a 7-day on followed by 7-day off schedule.  · With reduction in Xeloda dose, slight improvement in erythema involving dorsal aspect of the hands and slight decrease in sclerodactyly  · Patient was experiencing severe hand-foot syndrome related to Xeloda having developed skin peeling, sclerodactyly with limited use of her hands.  On 3/31/2022, Xeloda was held to allow for recovery.  Patient resumed Xeloda on 4/18/2022.  · Patient continues to use emollient cream frequently (currently 5 times daily) and topical triamcinolone 0.1% twice daily (2 weeks on followed by 2 weeks off as instructed by dermatology).  Findings on exam with overall improvement.    13. Evidence of steatosis on scans with mild intermittent elevated liver function studies:  · Liver function studies increased 8/20/19 with ALT 98, AST 70, normal total bilirubin.  · Negative viral hepatitis A, B, and C panel 8/23/18  · Likely related to hepatic steatosis.   · Subsequent improvement in LFTs  · LFTs remain normal today  14. Chemotherapy induced leukopenia:  · WBC today 3.9  15. GERD:  · Patient with significant history of reflux  · Patient hospitalized 5/29-6/1/20222 with viral gastroenteritis (see  further discussion below).  Repeat EGD consistent with gastritis.  · 6/6/2022 patient noting that she has been off omeprazole for a long time, unclear exactly how long.  We discussed with recent note of gastritis it was recommended she continue on PPI.  Insurance coverage for Protonix 40 mg daily and therefore will prescribe this.  16. Insomnia:  · Patient with prior paradoxical reaction to Benadryl  · Improved previously on temazepam 15 mg nightly as needed.   · Patient noted subsequently that temazepam was having no effect.   · Symptoms currently stable on gabapentin 600 mg nightly  · Patient has been continuing on temazepam 15 mg nightly which has been effective.    17. Health maintenance:  · Patient notes that she has a history of colon polyps as well as ulcerative colitis and was due for a follow-up colonoscopy on 9/12/2019.  We did discuss there is no necessity to pursue colonoscopy in the setting of her metastatic breast cancer.    · The patient did undergo colonoscopy on 2/7/2020 with findings of muscular hypertrophy and diverticulosis.   · See above discussion, note patient plans to proceed with colonoscopy in the future, being rescheduled  18. Right shoulder pain:  · Patient was evaluated by Dr Forrester.  She has experienced difficulty over a long time frame with right shoulder although she has not complained of this in prior visits to our office.  She reported difficulty with abduction.    · The patient did undergo MRI of her right shoulder on 11/21/2019 at an outside facility showing multiple abnormalities including supraspinatus tendinosis, labral tear but no evidence of metastatic disease.  · Patient developed pain in the left shoulder, was seen by orthopedics and underwent steroid injection with some improvement.  Right shoulder is stable currently.  Patient did undergo physical therapy with some improvement.  She continues to use tramadol 50 mg every 8-12 hours as needed.  19. Ocular changes in part  related to Xeloda:  · Patient experienced a mild degree of blurred vision as well as burning and pruritus.  · She was seen by her ophthalmologist and was placed on xiidra ophthalmic drops which have helped.  · Likely both issues are to some extent related to Xeloda.  · Symptoms did worsen and patient was seen by a new ophthalmologist at [facility].  She is now using an ocular lubricant at night in addition to artificial tears which have helped.  20. Elevated glucose:  · It is noted the patient's glucose at the last few visits has been in the high 100 range, postprandial.  · She has had a hemoglobin A1c that has been in the high 5-6 range in the past, on 5/1/2019 at 5.7  · Hemoglobin A1c was last checked in 11/12/2021 at 5.8  21. Possible loosening of pedicle screw at T3:  · CT cervical spine 5/17/2021 showed loosening of the left pedicle screw at T3.  Patient referred to orthopedics and was seen by Dr. Nayak who felt that there were no concerning findings on the scan  22. COVID-19 vaccination  · Patient received the Pfizer vaccine, second dose administered on 4/2/2021 without side effects.  · Patient received her third dose Pfizer COVID-19 injection in August 2021  23. Viral gastroenteritis.  · Admitted 5/29-6/1/2022 with intractable nausea and vomiting.  Patient's family had had recent viral illness.  · EGD performed consistent with gastritis.  Pathology negative for metastatic disease or H. Pylori.  · Xeloda held x1 week following hospitalization.  · 6/6/2022 patient is eating and drinking well with no recurrent GI symptoms.  Therefore resume Xeloda today.  24. Iron deficiency anemia  · 5/2/2022 Patient with hemoglobin down to 10.8 with MCV of 99.1.  Iron sat 11%, ferritin 21.  Patient started on multivitamin with iron included.  · 6/6/2022 hemoglobin 11.4.  Patient still with significant fatigue.  Repeat iron studies still consistent with iron deficiency anemia, ferritin 41, iron sat 14%.  Patient  states she could not tolerate the iron supplement due to GI upset.  Therefore we will proceed with IV iron in the form of Venofer.     Plan:  1. Resume today palliative single agent Xeloda 1000 mg a.m., 1500 mg in the p.m. 7 days on followed by 7 days off  2. Proceed with monthly Xgeva today  3. Proceed with IV Venofer 300 mg weekly x 4 doses beginning today.  4. Begin Protonix 40 mg daily (patient previously on omeprazole which she apparently stopped a long time ago unbeknownst to anyone.  Insurance now requiring Protonix instead).  5. Continue Eliquis 5 mg twice daily, refill prescription sent today.  6. Continue frequent use of emollient cream currently 5 times daily and add continue triamcinolone cream 0.1% twice daily (provided by dermatology) 2 weeks on followed by 2 weeks off as prescribed (resumed 1 to 2 days ago)  7. Continue Carafate 1 g 4 times daily   8. Continue ocular lubricating gel nightly per ophthalmology  9. Continue Provigil 100 mg daily and Cymbalta 30 mg twice daily per supportive oncology clinic.  Patient continues routine follow-up with supportive oncology.  10. Continue temazepam 15 mg nightly.  11. Patient continues on trimethoprim 100 mg daily that was prescribed by urogynecology for ongoing suppressive therapy for UTIs  12. In 5 weeks (due to 4 July holiday) MD visit with CBC, CMP, magnesium, phosphorus and Xgeva  13. In 7 weeks CT chest abdomen pelvis and bone scan  14. In 8 weeks MD visit with CBC, CMP, magnesium, phosphorus and Xgeva.     Patient continuing on high risk medication requiring intensive monitoring.       I spent 65 minutes caring for Martha on this date of service. This time includes time spent by me in the following activities: preparing for the visit, reviewing tests, obtaining and/or reviewing a separately obtained history, performing a medically appropriate examination and/or evaluation, counseling and educating the patient/family/caregiver, ordering medications,  tests, or procedures, referring and communicating with other health care professionals, documenting information in the medical record, independently interpreting results and communicating that information with the patient/family/caregiver and care coordination

## 2022-06-13 ENCOUNTER — INFUSION (OUTPATIENT)
Dept: ONCOLOGY | Facility: HOSPITAL | Age: 62
End: 2022-06-13

## 2022-06-13 VITALS
WEIGHT: 186 LBS | DIASTOLIC BLOOD PRESSURE: 70 MMHG | TEMPERATURE: 97 F | HEIGHT: 62 IN | RESPIRATION RATE: 18 BRPM | HEART RATE: 96 BPM | OXYGEN SATURATION: 97 % | BODY MASS INDEX: 34.23 KG/M2 | SYSTOLIC BLOOD PRESSURE: 110 MMHG

## 2022-06-13 DIAGNOSIS — D50.9 IRON DEFICIENCY ANEMIA, UNSPECIFIED IRON DEFICIENCY ANEMIA TYPE: Primary | ICD-10-CM

## 2022-06-13 DIAGNOSIS — T45.4X5A ADVERSE EFFECT OF IRON, INITIAL ENCOUNTER: ICD-10-CM

## 2022-06-13 PROCEDURE — 96365 THER/PROPH/DIAG IV INF INIT: CPT

## 2022-06-13 PROCEDURE — A9270 NON-COVERED ITEM OR SERVICE: HCPCS | Performed by: INTERNAL MEDICINE

## 2022-06-13 PROCEDURE — 25010000002 IRON SUCROSE PER 1 MG: Performed by: INTERNAL MEDICINE

## 2022-06-13 PROCEDURE — 96366 THER/PROPH/DIAG IV INF ADDON: CPT

## 2022-06-13 PROCEDURE — 63710000001 ACETAMINOPHEN 325 MG TABLET: Performed by: INTERNAL MEDICINE

## 2022-06-13 PROCEDURE — 63710000001 DIPHENHYDRAMINE PER 50 MG: Performed by: INTERNAL MEDICINE

## 2022-06-13 RX ORDER — DIPHENHYDRAMINE HCL 25 MG
25 CAPSULE ORAL ONCE
Status: COMPLETED | OUTPATIENT
Start: 2022-06-13 | End: 2022-06-13

## 2022-06-13 RX ORDER — SODIUM CHLORIDE 9 MG/ML
250 INJECTION, SOLUTION INTRAVENOUS ONCE
Status: COMPLETED | OUTPATIENT
Start: 2022-06-13 | End: 2022-06-13

## 2022-06-13 RX ORDER — ACETAMINOPHEN 325 MG/1
650 TABLET ORAL ONCE
Status: COMPLETED | OUTPATIENT
Start: 2022-06-13 | End: 2022-06-13

## 2022-06-13 RX ADMIN — IRON SUCROSE 300 MG: 20 INJECTION, SOLUTION INTRAVENOUS at 10:00

## 2022-06-13 RX ADMIN — ACETAMINOPHEN 650 MG: 325 TABLET ORAL at 09:49

## 2022-06-13 RX ADMIN — DIPHENHYDRAMINE HYDROCHLORIDE 25 MG: 25 CAPSULE ORAL at 09:49

## 2022-06-13 RX ADMIN — SODIUM CHLORIDE 250 ML: 9 INJECTION, SOLUTION INTRAVENOUS at 09:49

## 2022-06-14 ENCOUNTER — OFFICE VISIT (OUTPATIENT)
Dept: INTERNAL MEDICINE | Facility: CLINIC | Age: 62
End: 2022-06-14

## 2022-06-14 VITALS
BODY MASS INDEX: 34.23 KG/M2 | WEIGHT: 186 LBS | SYSTOLIC BLOOD PRESSURE: 118 MMHG | HEART RATE: 86 BPM | DIASTOLIC BLOOD PRESSURE: 60 MMHG | HEIGHT: 62 IN

## 2022-06-14 DIAGNOSIS — R53.83 OTHER FATIGUE: ICD-10-CM

## 2022-06-14 DIAGNOSIS — I10 HYPERTENSION, UNSPECIFIED TYPE: ICD-10-CM

## 2022-06-14 DIAGNOSIS — G89.29 CHRONIC LEFT SHOULDER PAIN: ICD-10-CM

## 2022-06-14 DIAGNOSIS — C79.51 BONE METASTASES: ICD-10-CM

## 2022-06-14 DIAGNOSIS — G60.0 HEREDITARY SENSORIMOTOR NEUROPATHY: ICD-10-CM

## 2022-06-14 DIAGNOSIS — M25.512 CHRONIC LEFT SHOULDER PAIN: ICD-10-CM

## 2022-06-14 DIAGNOSIS — Z00.00 HEALTHCARE MAINTENANCE: Primary | ICD-10-CM

## 2022-06-14 PROCEDURE — 1159F MED LIST DOCD IN RCRD: CPT | Performed by: INTERNAL MEDICINE

## 2022-06-14 PROCEDURE — G0439 PPPS, SUBSEQ VISIT: HCPCS | Performed by: INTERNAL MEDICINE

## 2022-06-14 PROCEDURE — 1170F FXNL STATUS ASSESSED: CPT | Performed by: INTERNAL MEDICINE

## 2022-06-14 PROCEDURE — 99214 OFFICE O/P EST MOD 30 MIN: CPT | Performed by: INTERNAL MEDICINE

## 2022-06-14 RX ORDER — PROMETHAZINE HYDROCHLORIDE 25 MG/1
25 TABLET ORAL EVERY 6 HOURS PRN
Qty: 30 TABLET | Refills: 1 | Status: ON HOLD | OUTPATIENT
Start: 2022-06-14 | End: 2022-12-20

## 2022-06-14 NOTE — PROGRESS NOTES
"The ABCs of the Annual Wellness Visit  Subsequent Medicare Wellness Visit    Chief Complaint   Patient presents with   • Annual Exam   • Vomiting   • Breast Cancer   • Shoulder Pain      Subjective    History of Present Illness:  Martha Davey is a 61 y.o. female who presents for a Subsequent Medicare Wellness Visit, to review chronic issues, and to address acute needs. Patient recently hospiralized w/ acute gastroenteritis. She has metastatic breast cancer. She is on xeloda for this. Med held. EGD w/ esophagitis Started on pantoprazole for this and is to take carafate (just received yesterday). She notes that she has had emesis 2 times total after discharge but feels overall much improved. She has iron def anemia related to chemotherapy. She has started iron infusion therapies.   Left shoulder pain. Aches \"all the time\" even w/ tramadol. Some decreased hearing.       The following portions of the patient's history were reviewed and   updated as appropriate: allergies, current medications, past family history, past medical history, past social history, past surgical history and problem list.    Compared to one year ago, the patient feels her physical   health is better.    Compared to one year ago, the patient feels her mental   health is better.    Recent Hospitalizations:  This patient has had a [facility] admission record on file within the last 365 days.    Current Medical Providers:  Patient Care Team:  Jazmine Chanel MD as PCP - General  Jazmine Chanel MD as PCP - Family Medicine  Jazmine Chanel MD Hart, Andrew, MD as Consulting Physician (Hematology and Oncology)  Roxanne Horta MD as Referring Physician (Obstetrics and Gynecology)  Roxanne Horta MD as Referring Physician (Obstetrics and Gynecology)  Ubaldo Hancock Jr., MD as Consulting Physician (Hematology and Oncology)  Louann Jack OD (Optometry)    Outpatient Medications Prior to Visit   Medication Sig Dispense Refill   • " acetaminophen (TYLENOL) 500 MG tablet Take 500 mg by mouth Every 6 (Six) Hours As Needed for Mild Pain .     • ALPRAZolam (Xanax) 0.25 MG tablet Take 1 tablet by mouth 2 (Two) Times a Day As Needed for Anxiety. 60 tablet 0   • apixaban (Eliquis) 5 MG tablet tablet Take 1 tablet by mouth Every 12 (Twelve) Hours. 180 tablet 3   • azelastine (ASTELIN) 0.1 % nasal spray 2 sprays into the nostril(s) as directed by provider 2 (Two) Times a Day. Use in each nostril as directed 30 mL 12   • calcium carbonate (OS-CARY) 600 MG tablet Take 600 mg by mouth 2 (Two) Times a Day With Meals.     • capecitabine (Xeloda) 500 MG chemo tablet Take 2 tablets (1000 mg) by mouth in the morning, and 3 tablets (1500 mg) in the evening. Take for 7 days on, followed by 7 days off. (Patient taking differently: Take 2 tablets (1000 mg) by mouth in the morning, and 3 tablets (1500 mg) in the evening. Take for 7 days on, followed by 7 days off.) 70 tablet 5   • cholecalciferol (VITAMIN D3) 1000 units tablet Take 1,000 Units by mouth Daily.     • Cyanocobalamin ER 1000 MCG tablet controlled-release Take 1 tablet by mouth Daily.     • Diclofenac Sodium (VOLTAREN) 1 % gel gel Place 4 g on the skin as directed by provider.     • DULoxetine (CYMBALTA) 30 MG capsule TAKE 1 CAPSULE BY MOUTH  TWICE DAILY 180 capsule 0   • FLONASE ALLERGY RELIEF 50 MCG/ACT nasal spray 2 sprays into each nostril daily.  11   • gabapentin (NEURONTIN) 300 MG capsule Take 1 capsule by mouth 2 (Two) Times a Day. 180 capsule 3   • metaxalone (Skelaxin) 800 MG tablet Take 1 tablet by mouth 3 (Three) Times a Day As Needed for Muscle Spasms. 30 tablet 1   • Multiple Vitamins-Minerals (Multivitamin Gummies Womens) chewable tablet Chew 1 tablet Daily.     • mupirocin (BACTROBAN) 2 % ointment      • ondansetron ODT (ZOFRAN-ODT) 8 MG disintegrating tablet Place 1 tablet on the tongue Every 8 (Eight) Hours As Needed for Nausea or Vomiting. 30 tablet 1   • pantoprazole (PROTONIX) 40 MG  EC tablet Take 1 tablet by mouth Daily. 90 tablet 2   • sennosides-docusate sodium (SENOKOT-S) 8.6-50 MG tablet Take 2 tablets by mouth 2 (Two) Times a Day. 90 tablet 2   • sucralfate (Carafate) 1 GM/10ML suspension Take 10 mL by mouth 4 (Four) Times a Day. (Patient taking differently: Take 1 g by mouth 4 (Four) Times a Day. As needed) 420 mL 2   • temazepam (RESTORIL) 15 MG capsule TAKE 1 CAPSULE BY MOUTH AT  NIGHT AS NEEDED FOR SLEEP 90 capsule 1   • traMADol (ULTRAM) 50 MG tablet TAKE 1 TABLET BY MOUTH  EVERY 8 HOURS AS NEEDED FOR MODERATE PAIN 90 tablet 1   • triamcinolone (KENALOG) 0.1 % cream Apply  topically to the appropriate area as directed 2 (Two) Times a Day. 15 g 0   • urea (CARMOL) 10 % cream Apply  topically to the appropriate area as directed 3 (Three) Times a Day. 453 g 1   • White Petrolatum-Mineral Oil (Wh Petrol-Mineral Oil-Lanolin) 0.1-0.1 % ointment Apply  to eye(s) as directed by provider.     • diphenoxylate-atropine (LOMOTIL) 2.5-0.025 MG per tablet Take 1 tablet by mouth 4 (Four) Times a Day As Needed for Diarrhea. 60 tablet 0   • losartan (COZAAR) 50 MG tablet TAKE 1 TABLET BY MOUTH  DAILY 90 tablet 3   • Hylan (SYNVISC) 16 MG/2ML solution prefilled syringe injection Inject 16 mg into the appropriate joint as directed by provider As Needed.     • mesalamine (LIALDA) 1.2 g EC tablet TAKE 1 TABLET BY MOUTH  TWICE DAILY 180 tablet 3   • nitrofurantoin, macrocrystal-monohydrate, (Macrobid) 100 MG capsule Take 1 capsule by mouth 2 (Two) Times a Day. (Patient taking differently: Take 100 mg by mouth 2 (Two) Times a Day. As needed) 14 capsule 0   • phenazopyridine (Pyridium) 200 MG tablet Take 1 tablet by mouth 3 (Three) Times a Day As Needed for Bladder Spasms. 12 tablet 0   • prochlorperazine (COMPAZINE) 10 MG tablet Take 1 tablet by mouth Every 6 (Six) Hours As Needed for Nausea or Vomiting. 30 tablet 0   • pseudoephedrine (Sudafed) 30 MG tablet Take 1 tablet by mouth Every 6 (Six) Hours As  Needed for Congestion. 20 tablet 0     No facility-administered medications prior to visit.       Opioid medication/s are on active medication list.  and I have evaluated her active treatment plan and pain score trends (see table).  There were no vitals filed for this visit.  I have reviewed the chart for potential of high risk medication and harmful drug interactions in the elderly.            Aspirin is not on active medication list.  Aspirin use is not indicated based on review of current medical condition/s. Risk of harm outweighs potential benefits.  .    Patient Active Problem List   Diagnosis   • Malignant neoplasm of overlapping sites of right breast in female, estrogen receptor negative (HCC)   • Hematuria   • Hyperlipidemia   • Hypertension   • Hereditary sensorimotor neuropathy   • Hyperglycemia   • MARIA M on CPAP   • Gastroesophageal reflux disease   • Colon polyp   • Diverticulitis of colon with perforation   • Foot pain   • Osteoarthritis of knee   • Leukocytosis   • Osteoarthritis of shoulder   • Senile osteopenia   • Chronic right-sided thoracic back pain   • Closed fracture of left fibula and tibia   • Closed displaced spiral fracture of shaft of left tibia   • Cancer associated pain   • Cord compression (HCC)   • Closed T6 spinal fracture (HCC)   • History of pulmonary embolism   • Bone metastases (HCC)   • Surgery follow-up examination   • Dysuria   • Pericardial effusion   • Other fatigue   • Acute pulmonary embolism (HCC)   • Breast cancer, right (HCC)   • 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)   • Mood disorder (HCC)   • 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  ACP discussion was held with the patient during this visit. Patient has an advance directive (not in EMR), copy requested.         "  Objective    Vitals:    06/14/22 1120   BP: 118/60   Pulse: 86   Weight: 84.4 kg (186 lb)   Height: 157.5 cm (62\")     Estimated body mass index is 34.02 kg/m² as calculated from the following:    Height as of this encounter: 157.5 cm (62\").    Weight as of this encounter: 84.4 kg (186 lb).    BMI is >= 30 and <35. (Class 1 Obesity). The following options were offered after discussion;: exercise counseling/recommendations and nutrition counseling/recommendations      Does the patient have evidence of cognitive impairment? No    Physical Exam  Vitals and nursing note reviewed.   Constitutional:       Comments: No acute distress.    HENT:      Head: Normocephalic and atraumatic.      Right Ear: Tympanic membrane normal.      Left Ear: Tympanic membrane normal.      Nose: Nose normal.      Mouth/Throat:      Mouth: Mucous membranes are moist.   Eyes:      Extraocular Movements: Extraocular movements intact.      Pupils: Pupils are equal, round, and reactive to light.   Cardiovascular:      Rate and Rhythm: Normal rate and regular rhythm.      Pulses: Normal pulses.      Heart sounds: Normal heart sounds.   Pulmonary:      Effort: Pulmonary effort is normal.      Breath sounds: Normal breath sounds.   Abdominal:      General: Abdomen is flat.      Palpations: Abdomen is soft.   Genitourinary:     General: Normal vulva.      Rectum: Normal.   Musculoskeletal:         General: Normal range of motion.      Cervical back: Normal range of motion.   Skin:     General: Skin is warm and dry.   Neurological:      Mental Status: She is alert and oriented to person, place, and time.      Comments: Chronic weakness related to charcot ambika     Psychiatric:         Thought Content: Thought content normal.         Judgment: Judgment normal.      Comments: Mood a little flat       Lab Results   Component Value Date    CHLPL 168 05/20/2022    TRIG 159 (H) 05/20/2022    HDL 54 05/20/2022    LDL 87 05/20/2022    VLDL 27 05/20/2022      "       HEALTH RISK ASSESSMENT    Smoking Status:  Social History     Tobacco Use   Smoking Status Never Smoker   Smokeless Tobacco Never Used     Alcohol Consumption:  Social History     Substance and Sexual Activity   Alcohol Use Yes    Comment: social     Fall Risk Screen:    STEADI Fall Risk Assessment has not been completed.    Depression Screening:  PHQ-2/PHQ-9 Depression Screening 6/6/2022   Retired PHQ-9 Total Score -   Retired Total Score -   Little Interest or Pleasure in Doing Things 0-->not at all   Feeling Down, Depressed or Hopeless 0-->not at all   Trouble Falling or Staying Asleep, or Sleeping Too Much -   Feeling Tired or Having Little Energy -   Poor Appetite or Overeating -   Feeling Bad about Yourself - or that You are a Failure or Have Let Yourself or Your Family Down -   Trouble Concentrating on Things, Such as Reading the Newspaper or Watching Television -   Moving or Speaking So Slowly that Other People Could Have Noticed? Or the Opposite - Being So Fidgety -   Thoughts that You Would be Better Off Dead or of Hurting Yourself in Some Way -   PHQ-9: Brief Depression Severity Measure Score 0   Little interest or pleasure in doing things -   Feeling down, depressed, or hopeless -   Trouble falling or staying asleep, or sleeping too much -   Feeling tired or having little energy -   Poor appetite or overeating -   Feeling bad about yourself - or that you are a failure or have let yourself or your family down -   Trouble concentrating on things, such as reading the newspaper or watching television -   Moving or speaking so slowly that other people could have noticed. Or the opposite - being so fidgety or restless that you have been moving around a lot more than usual -   Thoughts that you would be better off dead, or of hurting yourself in some way -   How difficult have these problems made it for you to do your work, take care of things at home, or get along with other people? -       Health Habits  and Functional and Cognitive Screening:  No flowsheet data found.    Age-appropriate Screening Schedule:  Refer to the list below for future screening recommendations based on patient's age, sex and/or medical conditions. Orders for these recommended tests are listed in the plan section. The patient has been provided with a written plan.    Health Maintenance   Topic Date Due   • ZOSTER VACCINE (1 of 2) Never done   • MAMMOGRAM  11/30/2017   • INFLUENZA VACCINE  10/01/2022   • LIPID PANEL  05/20/2023   • DXA SCAN  09/14/2023   • TDAP/TD VACCINES (4 - Td or Tdap) 05/06/2029   • PAP SMEAR  Discontinued              Assessment & Plan   CMS Preventative Services Quick Reference  Risk Factors Identified During Encounter  Current on vaccinations. Fall precautions discussed. Increased risk related to chronic weakness.   The above risks/problems have been discussed with the patient.  Follow up actions/plans if indicated are seen below in the Assessment/Plan Section.  Pertinent information has been shared with the patient in the After Visit Summary.    Diagnoses and all orders for this visit:    1. Healthcare maintenance (Primary)    2. Hypertension, unspecified type    3. Bone metastases (HCC)    4. Other fatigue    5. Hereditary sensorimotor neuropathy    Other orders  -     promethazine (PHENERGAN) 25 MG tablet; Take 1 tablet by mouth Every 6 (Six) Hours As Needed for Nausea or Vomiting.  Dispense: 30 tablet; Refill: 1    Patient encouraged to get new ankle supports and shoes as hers are causing callous formation and skin degredation. She will increase frequency tramadol given left shoulder pain. May try phenergan in place of compazine prn nausea. She will f/u w/ oncology to determine restarting chemotherapy. She will monitor bp. F/u in 6 months or prn.     Follow Up:   No follow-ups on file.     An After Visit Summary and PPPS were made available to the patient.          I spent 50 minutes caring for Martha on this date  of service. This time includes time spent by me in the following activities:preparing for the visit, reviewing tests, obtaining and/or reviewing a separately obtained history, performing a medically appropriate examination and/or evaluation , counseling and educating the patient/family/caregiver, ordering medications, tests, or procedures, referring and communicating with other health care professionals , documenting information in the medical record and independently interpreting results and communicating that information with the patient/family/caregiver

## 2022-06-20 ENCOUNTER — INFUSION (OUTPATIENT)
Dept: ONCOLOGY | Facility: HOSPITAL | Age: 62
End: 2022-06-20

## 2022-06-20 VITALS
HEART RATE: 80 BPM | DIASTOLIC BLOOD PRESSURE: 65 MMHG | TEMPERATURE: 97 F | WEIGHT: 188.4 LBS | RESPIRATION RATE: 18 BRPM | SYSTOLIC BLOOD PRESSURE: 105 MMHG | HEIGHT: 62 IN | OXYGEN SATURATION: 98 % | BODY MASS INDEX: 34.67 KG/M2

## 2022-06-20 DIAGNOSIS — T45.4X5A ADVERSE EFFECT OF IRON, INITIAL ENCOUNTER: ICD-10-CM

## 2022-06-20 DIAGNOSIS — D50.9 IRON DEFICIENCY ANEMIA, UNSPECIFIED IRON DEFICIENCY ANEMIA TYPE: Primary | ICD-10-CM

## 2022-06-20 PROCEDURE — 96366 THER/PROPH/DIAG IV INF ADDON: CPT

## 2022-06-20 PROCEDURE — 96365 THER/PROPH/DIAG IV INF INIT: CPT

## 2022-06-20 PROCEDURE — 25010000002 IRON SUCROSE PER 1 MG: Performed by: INTERNAL MEDICINE

## 2022-06-20 PROCEDURE — 63710000001 DIPHENHYDRAMINE PER 50 MG: Performed by: INTERNAL MEDICINE

## 2022-06-20 RX ORDER — SODIUM CHLORIDE 9 MG/ML
250 INJECTION, SOLUTION INTRAVENOUS ONCE
Status: COMPLETED | OUTPATIENT
Start: 2022-06-20 | End: 2022-06-20

## 2022-06-20 RX ORDER — DIPHENHYDRAMINE HCL 25 MG
25 CAPSULE ORAL ONCE
Status: COMPLETED | OUTPATIENT
Start: 2022-06-20 | End: 2022-06-20

## 2022-06-20 RX ORDER — ACETAMINOPHEN 325 MG/1
650 TABLET ORAL ONCE
Status: COMPLETED | OUTPATIENT
Start: 2022-06-20 | End: 2022-06-20

## 2022-06-20 RX ADMIN — SODIUM CHLORIDE 250 ML: 9 INJECTION, SOLUTION INTRAVENOUS at 09:12

## 2022-06-20 RX ADMIN — ACETAMINOPHEN 650 MG: 325 TABLET ORAL at 09:11

## 2022-06-20 RX ADMIN — DIPHENHYDRAMINE HYDROCHLORIDE 25 MG: 25 CAPSULE ORAL at 09:11

## 2022-06-20 RX ADMIN — IRON SUCROSE 300 MG: 20 INJECTION, SOLUTION INTRAVENOUS at 09:30

## 2022-06-20 NOTE — NURSING NOTE
1005: infiltrated iv with venofer.   Edematous above iv site. Infusion stopped; needle removed; ice to site and arm elevated. Informed her that she would need to apply ice at home and elevate left arm. Also informed that there could be skin staining from the iron. Informed that after discharge if she occurs any concerns, to call office.  Addendum: photographed left a/c infiltrated area so that she would be able to accurately compare and determine if site was improving. Less edematous at discharge. Noted purplish discoloration at stated site.

## 2022-06-27 ENCOUNTER — INFUSION (OUTPATIENT)
Dept: ONCOLOGY | Facility: HOSPITAL | Age: 62
End: 2022-06-27

## 2022-06-27 VITALS
BODY MASS INDEX: 35.04 KG/M2 | RESPIRATION RATE: 18 BRPM | TEMPERATURE: 96.7 F | SYSTOLIC BLOOD PRESSURE: 142 MMHG | DIASTOLIC BLOOD PRESSURE: 87 MMHG | OXYGEN SATURATION: 98 % | HEIGHT: 62 IN | HEART RATE: 90 BPM | WEIGHT: 190.4 LBS

## 2022-06-27 DIAGNOSIS — D50.9 IRON DEFICIENCY ANEMIA, UNSPECIFIED IRON DEFICIENCY ANEMIA TYPE: Primary | ICD-10-CM

## 2022-06-27 DIAGNOSIS — T45.4X5A ADVERSE EFFECT OF IRON, INITIAL ENCOUNTER: ICD-10-CM

## 2022-06-27 PROCEDURE — 63710000001 DIPHENHYDRAMINE PER 50 MG: Performed by: INTERNAL MEDICINE

## 2022-06-27 PROCEDURE — 96366 THER/PROPH/DIAG IV INF ADDON: CPT

## 2022-06-27 PROCEDURE — 25010000002 IRON SUCROSE PER 1 MG: Performed by: INTERNAL MEDICINE

## 2022-06-27 PROCEDURE — 96365 THER/PROPH/DIAG IV INF INIT: CPT

## 2022-06-27 RX ORDER — SODIUM CHLORIDE 9 MG/ML
250 INJECTION, SOLUTION INTRAVENOUS ONCE
Status: COMPLETED | OUTPATIENT
Start: 2022-06-27 | End: 2022-06-27

## 2022-06-27 RX ORDER — ACETAMINOPHEN 325 MG/1
650 TABLET ORAL ONCE
Status: COMPLETED | OUTPATIENT
Start: 2022-06-27 | End: 2022-06-27

## 2022-06-27 RX ORDER — DIPHENHYDRAMINE HCL 25 MG
25 CAPSULE ORAL ONCE
Status: COMPLETED | OUTPATIENT
Start: 2022-06-27 | End: 2022-06-27

## 2022-06-27 RX ADMIN — SODIUM CHLORIDE 250 ML: 9 INJECTION, SOLUTION INTRAVENOUS at 09:47

## 2022-06-27 RX ADMIN — DIPHENHYDRAMINE HYDROCHLORIDE 25 MG: 25 CAPSULE ORAL at 09:47

## 2022-06-27 RX ADMIN — IRON SUCROSE 300 MG: 20 INJECTION, SOLUTION INTRAVENOUS at 10:22

## 2022-06-27 RX ADMIN — ACETAMINOPHEN 650 MG: 325 TABLET ORAL at 09:47

## 2022-07-01 ENCOUNTER — DOCUMENTATION (OUTPATIENT)
Dept: PHARMACY | Facility: HOSPITAL | Age: 62
End: 2022-07-01

## 2022-07-01 NOTE — PROGRESS NOTES
Pt was held recently. Therefore, she has one full cycle left (70 pills) at home. I will call her in 3 weeks to verify need.

## 2022-07-08 NOTE — PROGRESS NOTES
Chief Complaint  Previous Stage Ib ([id number]) ER/DE positive, HER-2/peter negative right breast cancer with subsequent metastatic disease identified 10/8/2017, history of right pulmonary embolism, cancer related pain, chemotherapy-induced diarrhea, chemotherapy-induced mucositis, chemotherapy-induced hand-foot syndrome    Subjective        History of Present Illness  The patient returns today in follow-up continuing on oral Xeloda 1000 mg in the morning, 1500 mg in the evening for 7 days on followed by 7 days off as well as monthly Xgeva and Eliquis 5 mg twice daily.  Patient is due today for Xgeva and a 1 week delay due to 4 July holiday last Monday.  She has laboratory studies to review today.  She was found to be iron deficient and we did attempt treatment with oral iron however she did not tolerate this, was having significant GI difficulties and we have subsequently treated her with Venofer 300 mg IV weekly x4 initiated 6/6/2022 incomplete on 6/27/2022.  She tolerated this well.  She had been hospitalized 5/29 - 6/1/2022 with what was felt to be a viral gastroenteritis that was exacerbated when she tried to resume her Xeloda.  Symptoms ultimately resolved and she was able to resume her Xeloda when she returned to the office on 6/6/2022.  She has continued to do well back on Xeloda with no significant new issues.  She has continued difficulty with hand-foot syndrome primarily with involvement in the palms of her hands.  She does use Aquaphor frequently.  She also has triamcinolone cream 0.1% twice daily 2 weeks on followed by 2 weeks of prescribed by her dermatologist that she uses intermittently.  She has not used this recently and she is developing increasing erythema in her hands however is not having significant skin peeling at this point.  She notes that her bowels have waxed and waned with constipation alternating with loose stools however now are becoming more regular.  Her weight is down today from 190  "down to 184 pounds.  She reports that she has been \"cutting back\" and intentionally trying to lose some weight.  She is trying to exercise regularly and does walk on a regular basis.  She is very concerned about her  who is a patient in our practice.  She is concerned that he is not doing well with his treatment overall.  We did discuss the issue today.  Patient does continue follow-up with supportive oncology, last seen on 6/3/2022 and due to be seen again in early August.      Objective   Vital Signs:   /80   Pulse 96   Temp 97.1 °F (36.2 °C) (Temporal)   Resp 16   Ht 157.5 cm (62.01\")   Wt 83.9 kg (184 lb 14.4 oz)   SpO2 97%   BMI 33.81 kg/m²     Physical Exam  Constitutional:       Appearance: She is well-developed.   Eyes:      Conjunctiva/sclera: Conjunctivae normal.   Neck:      Thyroid: No thyromegaly.   Cardiovascular:      Rate and Rhythm: Normal rate and regular rhythm.      Heart sounds: No murmur heard.    No friction rub. No gallop.   Pulmonary:      Effort: No respiratory distress.      Breath sounds: Normal breath sounds.   Chest:   Breasts:      Right: No supraclavicular adenopathy.      Left: No supraclavicular adenopathy.       Abdominal:      General: Bowel sounds are normal. There is no distension.      Palpations: Abdomen is soft.      Tenderness: There is no abdominal tenderness.   Lymphadenopathy:      Head:      Right side of head: No submandibular adenopathy.      Cervical: No cervical adenopathy.      Upper Body:      Right upper body: No supraclavicular adenopathy.      Left upper body: No supraclavicular adenopathy.   Skin:     General: Skin is warm and dry.      Findings: Rash present.      Comments: Erythema on the palms bilaterally extending to minor extent onto the dorsal aspect of the hand and fingers.  There is not significant skin peeling currently.  There is mild sclerodactyly.   Neurological:      Mental Status: She is alert and oriented to person, place, and " time.      Cranial Nerves: No cranial nerve deficit.      Motor: No abnormal muscle tone.      Deep Tendon Reflexes: Reflexes normal.   Psychiatric:         Behavior: Behavior normal.        Result Review : Reviewed CBC, CMP, magnesium, phosphorus from today     Assessment and Plan     1. Previous Stage Ib ([id number]) right breast cancer:  · Diagnosed May 2010 with excisional biopsy for invasive ductal carcinoma, 1.3 cm, grade 2, ER 90%, NV 80%, HER-2/peter negative (1+ IHC).    · Subsequent right mastectomy in July 2010 with no residual breast malignancy, 1/5 sentinel lymph node with micrometastasis (0.25 mm).    · Treated in the [facility] system with adjuvant AC ×4 cycles in 2010 (no taxanes administered due to underlying Charcot-Saloni-Tooth with peripheral neuropathy).    · Adjuvant Femara (postmenopausal) initiated October 2010 with plan to treat ×10 years.    · Genetic testing reportedly negative.    · Developed osteopenia treated with Prolia beginning 2/27/13. Subsequently discontinued due to identification of metastatic disease.  2. Recurrent/metastatic disease identified 10/8/17:  · Disease involving thoracic spine with cord compression at T6, lumbosacral involvement, sternal and right sternoclavicular involvement.    · Femara discontinued in 10/2017.    · Radiation administered (in the [facility] system) to the thoracic spine beginning 10/19/17 treating T3-T9 to a dose of 24 gray in 6 fractions.  · Evaluation with MRI 12/8/17 showing persistent T6 cord compression with persistent neurologic compromise requiring surgical treatment 12/11/17 with T6 laminectomy/corpectomy and T3-T9 fusion.  Pathology with metastatic carcinoma of breast origin, ER negative, NV negative, HER-2/peter negative (1+ IHC).  · Additional staging evaluation 12/8/17 with no evidence of visceral metastatic disease, bone scan showing involvement of thoracic spine, sternum, left humerus, mid frontal bone.  No plane film correlate of left humerus  lesion.  MRI lumbar spine with small intradural L3 metastasis.  CA 15-3 12/6/17- 17.  · Palliative radiation therapy to L3 dural metastasis and left humerus initiated 1/15/18 (10 fractions), completed 1/26/18.  · Hypercalcemia of malignancy with calcium in the 10-11 range.  · Initiation of monthly Xgeva 1/23/18.  · Baseline CT scan 1/30/18 with no evidence of visceral involvement.  Cluster of nodular opacities in the right lower lobe suspected to be infectious or related to bronchiolitis. Bone scan 1/30/18 showed postsurgical change in the thoracic spine, stable uptake in the frontal bone, no new areas of disease.  · Initiation of palliative oral single agent Xeloda 2/7/18 2000 mg a.m., 1500 mg p.m. for 14/21 days.   · Following 3 cycles xeloda, bone scan 4/4/18 showed no change from the prior study.  CT scan 4/4/18 showed a small pericardial effusion of unclear significance as well as a subcutaneous nodule in the right lateral chest wall.  Subsequent evaluation with echocardiogram 4/17/18 showed no evidence of pericardial effusion.  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  We discussed the possibility of surgical excision of the right subcutaneous chest wall lesion for more definitive diagnosis.  Reviewed previous CT images dating back to 12/8/17 and the lesion was present even at that time measuring around 1.7 cm although not commented on in the radiology report.  As this appears to be an indolent low-grade process unrelated to her breast cancer, recommendee foregoing surgical excision at this time and monitoring this area on future scans.  The patient agreed.    · Following 6 cycles of Xeloda, CT 6/6/18  showed stable findings, no evidence of progressive disease.  There was a comment regarding subcutaneous abnormality in the anterior abdominal wall and this was  related to Lovenox injection sites.  Bone scan 6/6/18 showed no interval change.   · CT scan and bone scan 8/13/18 following 9 cycles of Xeloda showed stable findings with no evidence of significant visceral metastases.  Her bone lesions appear stable on bone scan.  The spindle cell neoplasm in her right chest wall actually decreased in size from 2 cm down to 1.6 cm.    · The patient experienced some symptoms of diarrhea, anorexia, generalized weakness during cycle 9 Xeloda it was unclear whether this was related to a viral gastroenteritis or toxicity from treatment.  Symptoms recurred during cycle 10 and treatment was cut short by 2 days.  Symptoms attributed to Xeloda.  With cycle 11, dose and schedule altered to 1500 mg twice daily for 7 days on followed by 7 days off .  · CT scan 9/9/2020 with no significant changes.  Bone scan 9/9/2020 read as unchanged from prior studies however did note an area of slight activity in the medial left femur.  Contacted radiology and although this was not noted on prior reports appears to have been present.  Subsequent MRI left femur 9/21/2020 with cortical thickening and periosteal edema left iliac us muscle insertion to the medial left femur with no evidence of metastatic disease, favored to represent periosteal change secondary to insertional tendinitis.  · Severe hand-foot symptoms causing sclerodactyly and limitation in finger movement prompted change in dosing in July 2021 with Xeloda decreased to 1000 mg a.m., 1500 mg p.m. for 7 days on followed by 7 days off.  · Patient was experiencing severe hand-foot syndrome related to Xeloda having developed skin peeling, sclerodactyly with limited use of her hands.  On 3/31/2022, Xeloda was held to allow for recovery.  Patient resumed Xeloda on 4/18/2022.  · Patient required hospitalization 5/29 - 6/1/2022 with presumed viral gastroenteritis that was exacerbated by Xeloda.  Xeloda held briefly, resumed on 6/6/2022.  · Most recent  interval scans from 5/30/2022 with CT chest abdomen pelvis that showed new 5 mm nodule right lower lobe, left supraclavicular lymph nodes increased in size along with subcarinal lymph node, right posterior lateral chest wall nodule increased in size as well as increase in small lymph nodes in the mesentery.  Stable bony metastases.  Reviewed CT and changes noted on scan were of insignificant degree, lymph nodes in question are of normal size.  Bone scan 5/31/2022 with stable findings.  · The patient returns today continuing on treatment with Xeloda 1000 mg a.m., 1500 mg p.m. 7 days on followed by 7 days off.  She also continues on monthly Xgeva that is due today (1 week delay to 4 July holiday).  The patient is now beginning off week of Xeloda.  She is tolerating this reasonably well, does continue with some degree of hand-foot syndrome.  There is erythema present currently but no skin peeling.  Her degree of sclerodactyly has waxed and waned, currently mild.  She is not having any significant GI side effects at this time, does note some alternating constipation and loose stools but her bowels are more recently have been stable.  She has no clinical evidence of progressive disease.  We we will continue with previously discussed schedule with scans in 3 weeks with CT and bone scan and I will see her in 4 weeks to follow-up results when she is again due for Xgeva.  3. Right pulmonary embolism:  · Diagnosed on CT angiogram 10/21/17 involving small right lower lobe pulmonary artery.  Lower extremity Dopplers negative.  · Bilateral lower extremity Dopplers negative again 12/5/17.  · Received chronic Lovenox 1 mg/kg twice per day, transition to oral Eliquis in February 2019, continuing on Eliquis 5 mg twice daily.  · The patient has had no bleeding difficulties on anticoagulation  4. Cancer related pain:  · Previously receiving Duragesic 50 µg patch every 72 hours along with Dilaudid 4 mg as needed for breakthrough  pain  · The patient's pain improved over time and she was able to discontinue both Duragesic and Dilaudid in the interval.  · Patient does take occasional Flexeril at bedtime due to back spasm/pain when she has been more active.  · The patient does have some occasional aggravation of her chronic back pain and does use tramadol 50 mg every 8 hours as needed  · Patient's pain is stable.  She does continue to use tramadol 50 mg every 8-12 hours as needed which has been effective.  The patient does have chronic difficulty with pain involving her low back, left shoulder.  Physical therapy did help to produce some relief.  Pain stable recently  5. Chemotherapy-induced diarrhea with subsequent C. difficile colitis in the setting of previous ulcerative colitis:  · Patient with reported history of ulcerative colitis, continues on mesalamine.  · The patient developed initial diarrhea related to Xeloda at regular dosing.  · Symptoms improved on reduced dose Xeloda  · Flare of symptoms in October 2018 with apparent finding of C. difficile colitis by GI, treated with course of oral vancomycin with improvement in symptoms.  · Patient notes minimal intermittent diarrhea/loose stools on Xeloda requiring occasional dosing of Imodium.    · Patient required hospitalization 5/29 - 6/1/2022 with presumed viral gastroenteritis that was exacerbated by Xeloda.  Xeloda held briefly, resumed on 6/6/2022.  · Patient reports that there was some fluctuation between constipation and loose stool however more recently her bowel function has stabilized.  6. Traumatic left tibia/fibular fracture:  · Status post ORIF 12/6/17  · Specimen was sent for pathologic review, negative for evidence of malignancy  7. Hypercalcemia:  · Suspect hypercalcemia of malignancy, calcium in  10-11 range previously.  · Calcium normalized following initiation of monthly Xgeva on 1/23/18.  8. Chemotherapy-induced mucositis:  · Patient had a minimal degree of mucositis  with cycle 2.  The patient has magic mouthwash to use as needed.  No subsequent mucositis.  9. Recurrent UTI, bladder wall thickening on CT:  · Patient had an enterococcal UTI on 3/2/18 sensitive to nitrofurantoin and received treatment, unclear how long.  · Recurrent UTI 3/20/18 with urine culture growing Klebsiella, initially treated with nitrofurantoin, transitioned to Levaquin.  · CT 4/4/18 with diffuse bladder wall thickening with increased nodular thickening at the left base.  Referral to urogynecology Dr. May Johnson.  She was placed on a prophylactic dose of trimethoprim 100 mg daily, bladder wall thickening felt to be related to recent recurrent urinary tract infections.  · Patient with urinary symptoms, treated with course of Macrobid at the end of December 2018, urine culture however was negative 12/31/18.  · Patient was found to have Klebsiella UTI 7/29/2019 which was successfully treated with Macrobid with complete resolution of symptoms.  · CT 8/10/2021 with diffuse bladder wall thickening new from 5/17/2021 (however seen on multiple prior scans).    · UTI on 10/18/2021 with E. coli greater than 100,000 colonies that was pansensitive, treated with Macrobid x7 days  · With ongoing/recurrent UTIs patient was seen by urogynecology and initiated suppressive therapy with trimethoprim 100 mg daily in December 2021.  She has not experienced any further urinary tract infections.  · CT abdomen pelvis 3/28/2022 does show diffuse thickening of urinary bladder as has been seen on prior studies indicative of cystitis.  · No recent urinary tract infections continuing on trimethoprim 100 mg daily.  10.   Mobility:  · The patient underwent an intensive course of [facility] at [facility].  She graduated from her outpatient course November 2018.  · Overall the patient has improved dramatically in terms of mobility,   · Patient reports that she has not been walking for exercise as much recently and intends to get back to  this.  She had been walking up to a mile daily.  11.  Depression:  · The patient is continuing follow-up in the supportive oncology clinic and is currently continuing on Cymbalta 30 mg twice daily as well as gabapentin 300 mg at dinner and 300 mg nightly, temazepam 15 mg nightly as needed, Provigil 100 mg daily.  · Patient does continue to attend support groups at [facility].  · The patient does continue routine follow-up in supportive oncology clinic.    · Patient does have multiple stressors with her 's malignancy and chemotherapy as well as her autistic son who is living with them at home.  · Patient last seen by supportive oncology 6/3/2022  12. 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  Symptoms did improve and she continued on the same dose.    · Patient was referred to dermatology and has been continuing on triamcinolone 0.1% cream used for 1 week on followed by 1 week off which led to some further improvement.   · Progressive palmar erythema with development of sclerodactyly and effect on dexterity.  Addition of urea-based cream 3 times daily.  · Patient with continued difficulty regarding erythema of her hands that extended onto the dorsal aspect and was causing contractures/sclerodactyly in her fingers affecting her dexterity.  In July 2021, held Xeloda for an additional week and then reduced dose from 1500 mg twice daily down to 1000 mg a.m. and 1500 mg p.m. and continued on a 7-day on followed by 7-day off schedule.  · With reduction in Xeloda dose, slight improvement in erythema involving dorsal aspect of the hands and slight decrease in sclerodactyly  · Patient was experiencing severe hand-foot syndrome related to Xeloda having developed skin peeling, sclerodactyly with limited use of her hands.  On 3/31/2022,  Xeloda was held to allow for recovery.  Patient resumed Xeloda on 4/18/2022.  · Patient continues to use emollient cream frequently (currently 5 times daily) and topical triamcinolone 0.1% twice daily (2 weeks on followed by 2 weeks off as instructed by dermatology).  Symptoms are mild/moderate currently with erythema and no skin peeling.  Erythema does extend onto the dorsal aspect of the hands as it has in the past.  She has not been using the triamcinolone recently, only the emollient cream and I encouraged her to resume the triamcinolone as well.  Sclerodactyly is currently mild/moderate.  13. Evidence of steatosis on scans with mild intermittent elevated liver function studies:  · Liver function studies increased 8/20/19 with ALT 98, AST 70, normal total bilirubin.  · Negative viral hepatitis A, B, and C panel 8/23/18  · Likely related to hepatic steatosis.   · Subsequent improvement in LFTs  · LFTs remain normal today  14. Chemotherapy induced leukopenia:  · WBC today  5.05  15. GERD, question of celiac disease:  · Patient with significant history of reflux  · Patient currently receiving Protonix 40 mg daily daily and Carafate 1 g 4 times daily as needed  · Patient required hospitalization 5/29 - 6/1/2022 with presumed viral gastroenteritis that was exacerbated by Xeloda.    · EGD performed 5/31/2022 during hospitalization with biopsy that showed focal blunting of villi and atrophy with slight increase in intraepithelial lymphocytes.  Changes were minimal but recommended correlation with serology to exclude celiac disease.  · Patient will need further follow-up with GI to discuss this (followed by Dr. Ocasio) and to pursue serologic testing and determine whether patient should be receiving a gluten-free diet.  16. Insomnia:  · Patient with prior paradoxical reaction to Benadryl  · Improved previously on temazepam 15 mg nightly as needed.   · Patient noted subsequently that temazepam was having no effect.    · Symptoms currently stable on gabapentin 300 mg at dinner and 300 mg nightly as well as temazepam 15 mg nightly as needed  17. Health maintenance:  · Patient notes that she has a history of colon polyps as well as ulcerative colitis and was due for a follow-up colonoscopy on 9/12/2019.  We did discuss there is no necessity to pursue colonoscopy in the setting of her metastatic breast cancer.    · The patient did undergo colonoscopy on 2/7/2020 with findings of muscular hypertrophy and diverticulosis.   · See above discussion, note patient plans to proceed with colonoscopy in the future, being rescheduled  18. Right shoulder pain:  · Patient was evaluated by Dr Forrester.  She has experienced difficulty over a long time frame with right shoulder although she has not complained of this in prior visits to our office.  She reported difficulty with abduction.    · The patient did undergo MRI of her right shoulder on 11/21/2019 at an outside facility showing multiple abnormalities including supraspinatus tendinosis, labral tear but no evidence of metastatic disease.  · Patient developed pain in the left shoulder, was seen by orthopedics and underwent steroid injection with some improvement.  Right shoulder is stable currently.  Patient did undergo physical therapy with some improvement.  She continues to use tramadol 50 mg every 8-12 hours as needed.  19. Ocular changes in part related to Xeloda:  · Patient experienced a mild degree of blurred vision as well as burning and pruritus.  · She was seen by her ophthalmologist and was placed on xiidra ophthalmic drops which have helped.  · Likely both issues are to some extent related to Xeloda.  · Symptoms did worsen and patient was seen by a new ophthalmologist at [facility].  She is now using an ocular lubricant at night in addition to artificial tears which have helped.  20. Elevated glucose:  · It is noted the patient's glucose at the last few visits has  been in the high 100 range, postprandial.  · She has had a hemoglobin A1c that has been in the high 5-6 range in the past, on 5/1/2019 at 5.7  · Hemoglobin A1c was last checked in 11/12/2021 at 5.8  21. Possible loosening of pedicle screw at T3:  · CT cervical spine 5/17/2021 showed loosening of the left pedicle screw at T3.  Patient referred to orthopedics and was seen by Dr. Nayak who felt that there were no concerning findings on the scan  22. COVID-19 vaccination  · Patient received the Pfizer vaccine, second dose administered on 4/2/2021 without side effects.  · Patient received her third dose Pfizer COVID-19 injection in August 2021  23. Iron deficiency anemia  · Labs on 5/2/2022 with hemoglobin 10.8.  Additional labs with iron 48, ferritin 21.2, iron saturation 11%, TIBC 4 and 32, folate greater than 20, B12 greater than 2000.    · Attempted treatment with oral iron daily however patient was intolerant  · Patient subsequently received Venofer 300 mg weekly x4 beginning 6/6/2022 and completed on 6/27/2022  · Hemoglobin today has improved that 12.7.  We will check new baseline iron panel and ferritin today.     Plan:  1. Continue palliative single agent Xeloda 1000 mg a.m., 1500 mg in the p.m. 7 days on followed by 7 days off (currently beginning a week off)  2. Proceed with monthly Xgeva today (1 week delay due to Fourth of July holiday last week)  3. Additional labs today with new baseline iron panel/ferritin  4. Continue Eliquis 5 mg twice daily, refill prescription sent today.  5. The patient will continue frequent use of emollient cream currently 5 times daily and add continue triamcinolone cream 0.1% twice daily (provided by dermatology) 2 weeks on followed by 2 weeks off as prescribed (asked patient to resume this today)  6. Continue Protonix 40 mg daily  7. Continue Carafate 1 g 4 times daily  as needed  8. We will ask patient to return to see Dr. Ocasio in GI regarding recent EGD biopsy result from  5/31/2022 concerning for possible celiac disease.  Patient will need serologic testing.  9. Continue ocular lubricating gel nightly per ophthalmology  10. Continue Provigil 100 mg daily and Cymbalta 30 mg twice daily, gabapentin 300 mg at dinner and 300 mg at night per supportive oncology clinic.  Patient continues routine follow-up with supportive oncology (due to be seen on 8/5/2022).  11. Continue temazepam 15 mg nightly.  12. Patient continues on trimethoprim 100 mg daily that was prescribed by urogynecology for ongoing suppressive therapy for UTIs  13. In 3 weeks CT chest abdomen pelvis and bone scan  14. In 4 weeks MD visit with CBC, CMP, magnesium, phosphorus and Xgeva.     Patient continuing on high risk medication requiring intensive monitoring.       I did spend 40 minutes in total time caring for the patient today, time spent in review of records, face-to-face consultation, coordination of care, placement of orders, completion of documentation.

## 2022-07-11 ENCOUNTER — INFUSION (OUTPATIENT)
Dept: ONCOLOGY | Facility: HOSPITAL | Age: 62
End: 2022-07-11

## 2022-07-11 ENCOUNTER — OFFICE VISIT (OUTPATIENT)
Dept: ONCOLOGY | Facility: CLINIC | Age: 62
End: 2022-07-11

## 2022-07-11 ENCOUNTER — LAB (OUTPATIENT)
Dept: LAB | Facility: HOSPITAL | Age: 62
End: 2022-07-11

## 2022-07-11 VITALS
RESPIRATION RATE: 16 BRPM | WEIGHT: 184.9 LBS | BODY MASS INDEX: 34.03 KG/M2 | OXYGEN SATURATION: 97 % | DIASTOLIC BLOOD PRESSURE: 80 MMHG | HEIGHT: 62 IN | HEART RATE: 96 BPM | SYSTOLIC BLOOD PRESSURE: 140 MMHG | TEMPERATURE: 97.1 F

## 2022-07-11 DIAGNOSIS — Z17.1 MALIGNANT NEOPLASM OF OVERLAPPING SITES OF RIGHT BREAST IN FEMALE, ESTROGEN RECEPTOR NEGATIVE: ICD-10-CM

## 2022-07-11 DIAGNOSIS — C50.811 MALIGNANT NEOPLASM OF OVERLAPPING SITES OF RIGHT BREAST IN FEMALE, ESTROGEN RECEPTOR NEGATIVE: Primary | ICD-10-CM

## 2022-07-11 DIAGNOSIS — D50.8 OTHER IRON DEFICIENCY ANEMIA: ICD-10-CM

## 2022-07-11 DIAGNOSIS — C50.811 MALIGNANT NEOPLASM OF OVERLAPPING SITES OF RIGHT BREAST IN FEMALE, ESTROGEN RECEPTOR NEGATIVE: ICD-10-CM

## 2022-07-11 DIAGNOSIS — Z17.1 MALIGNANT NEOPLASM OF OVERLAPPING SITES OF RIGHT BREAST IN FEMALE, ESTROGEN RECEPTOR NEGATIVE: Primary | ICD-10-CM

## 2022-07-11 LAB
ALBUMIN SERPL-MCNC: 4.2 G/DL (ref 3.5–5.2)
ALBUMIN/GLOB SERPL: 1.6 G/DL (ref 1.1–2.4)
ALP SERPL-CCNC: 63 U/L (ref 38–116)
ALT SERPL W P-5'-P-CCNC: 15 U/L (ref 0–33)
ANION GAP SERPL CALCULATED.3IONS-SCNC: 9.7 MMOL/L (ref 5–15)
AST SERPL-CCNC: 19 U/L (ref 0–32)
BASOPHILS # BLD AUTO: 0.07 10*3/MM3 (ref 0–0.2)
BASOPHILS NFR BLD AUTO: 1.4 % (ref 0–1.5)
BILIRUB SERPL-MCNC: 0.4 MG/DL (ref 0.2–1.2)
BUN SERPL-MCNC: 20 MG/DL (ref 6–20)
BUN/CREAT SERPL: 21.3 (ref 7.3–30)
CALCIUM SPEC-SCNC: 9.3 MG/DL (ref 8.5–10.2)
CHLORIDE SERPL-SCNC: 102 MMOL/L (ref 98–107)
CO2 SERPL-SCNC: 28.3 MMOL/L (ref 22–29)
CREAT SERPL-MCNC: 0.94 MG/DL (ref 0.6–1.1)
DEPRECATED RDW RBC AUTO: 70.1 FL (ref 37–54)
EGFRCR SERPLBLD CKD-EPI 2021: 69.2 ML/MIN/1.73
EOSINOPHIL # BLD AUTO: 0.32 10*3/MM3 (ref 0–0.4)
EOSINOPHIL NFR BLD AUTO: 6.3 % (ref 0.3–6.2)
ERYTHROCYTE [DISTWIDTH] IN BLOOD BY AUTOMATED COUNT: 19.1 % (ref 12.3–15.4)
FERRITIN SERPL-MCNC: 345.8 NG/ML (ref 13–150)
GLOBULIN UR ELPH-MCNC: 2.6 GM/DL (ref 1.8–3.5)
GLUCOSE SERPL-MCNC: 131 MG/DL (ref 74–124)
HCT VFR BLD AUTO: 39.5 % (ref 34–46.6)
HGB BLD-MCNC: 12.7 G/DL (ref 12–15.9)
IMM GRANULOCYTES # BLD AUTO: 0.01 10*3/MM3 (ref 0–0.05)
IMM GRANULOCYTES NFR BLD AUTO: 0.2 % (ref 0–0.5)
IRON 24H UR-MRATE: 62 MCG/DL (ref 37–145)
IRON SATN MFR SERPL: 18 % (ref 14–48)
LYMPHOCYTES # BLD AUTO: 1.23 10*3/MM3 (ref 0.7–3.1)
LYMPHOCYTES NFR BLD AUTO: 24.4 % (ref 19.6–45.3)
MAGNESIUM SERPL-MCNC: 1.8 MG/DL (ref 1.8–2.5)
MCH RBC QN AUTO: 32 PG (ref 26.6–33)
MCHC RBC AUTO-ENTMCNC: 32.2 G/DL (ref 31.5–35.7)
MCV RBC AUTO: 99.5 FL (ref 79–97)
MONOCYTES # BLD AUTO: 0.49 10*3/MM3 (ref 0.1–0.9)
MONOCYTES NFR BLD AUTO: 9.7 % (ref 5–12)
NEUTROPHILS NFR BLD AUTO: 2.93 10*3/MM3 (ref 1.7–7)
NEUTROPHILS NFR BLD AUTO: 58 % (ref 42.7–76)
NRBC BLD AUTO-RTO: 0 /100 WBC (ref 0–0.2)
PHOSPHATE SERPL-MCNC: 3.4 MG/DL (ref 2.5–4.5)
PLATELET # BLD AUTO: 229 10*3/MM3 (ref 140–450)
PMV BLD AUTO: 10.5 FL (ref 6–12)
POTASSIUM SERPL-SCNC: 4.6 MMOL/L (ref 3.5–4.7)
PROT SERPL-MCNC: 6.8 G/DL (ref 6.3–8)
RBC # BLD AUTO: 3.97 10*6/MM3 (ref 3.77–5.28)
SODIUM SERPL-SCNC: 140 MMOL/L (ref 134–145)
TIBC SERPL-MCNC: 336 MCG/DL (ref 249–505)
TRANSFERRIN SERPL-MCNC: 240 MG/DL (ref 200–360)
WBC NRBC COR # BLD: 5.05 10*3/MM3 (ref 3.4–10.8)

## 2022-07-11 PROCEDURE — 36415 COLL VENOUS BLD VENIPUNCTURE: CPT

## 2022-07-11 PROCEDURE — 96372 THER/PROPH/DIAG INJ SC/IM: CPT

## 2022-07-11 PROCEDURE — 83540 ASSAY OF IRON: CPT | Performed by: INTERNAL MEDICINE

## 2022-07-11 PROCEDURE — 99215 OFFICE O/P EST HI 40 MIN: CPT | Performed by: INTERNAL MEDICINE

## 2022-07-11 PROCEDURE — 83735 ASSAY OF MAGNESIUM: CPT

## 2022-07-11 PROCEDURE — 84100 ASSAY OF PHOSPHORUS: CPT

## 2022-07-11 PROCEDURE — 80053 COMPREHEN METABOLIC PANEL: CPT

## 2022-07-11 PROCEDURE — 85025 COMPLETE CBC W/AUTO DIFF WBC: CPT

## 2022-07-11 PROCEDURE — 84466 ASSAY OF TRANSFERRIN: CPT | Performed by: INTERNAL MEDICINE

## 2022-07-11 PROCEDURE — 82728 ASSAY OF FERRITIN: CPT | Performed by: INTERNAL MEDICINE

## 2022-07-11 PROCEDURE — 25010000002 DENOSUMAB 120 MG/1.7ML SOLUTION: Performed by: INTERNAL MEDICINE

## 2022-07-11 RX ADMIN — DENOSUMAB 120 MG: 120 INJECTION SUBCUTANEOUS at 09:01

## 2022-07-12 ENCOUNTER — TELEPHONE (OUTPATIENT)
Dept: ONCOLOGY | Facility: CLINIC | Age: 62
End: 2022-07-12

## 2022-07-12 NOTE — TELEPHONE ENCOUNTER
Phoned patient to inform her that Dr. Hancock meant to talk to her about her biopsy from her previous EGD performed while she was hospitalized on 5/31/2022 during yesterday's office visit.  Per Dr. Hancock, the biopsy changes were questionable for possible celiac disease which could produce malabsorption, and he recommends patient see her gastroenterologist to discuss performing serology for celiac disease in light of the biopsy change. Informed patient that If positive she would need to likely begin a gluten-free diet, however, this would be deferred to her gastroenterologist. Patient v/u and stated she would follow up with her GI doc.

## 2022-07-13 ENCOUNTER — TELEPHONE (OUTPATIENT)
Dept: INTERNAL MEDICINE | Facility: CLINIC | Age: 62
End: 2022-07-13

## 2022-07-13 NOTE — TELEPHONE ENCOUNTER
Caller: Martha Davey    Relationship: Self    Best call back number: [phone]    What orders are you requesting (i.e. lab or imaging): ORDER FOR BRACES    Where will you receive your lab/imaging services: [facility]: PROSTHETICS AND ORTHOTICS, FAX [phone]

## 2022-07-21 ENCOUNTER — LAB (OUTPATIENT)
Dept: LAB | Facility: HOSPITAL | Age: 62
End: 2022-07-21

## 2022-07-21 DIAGNOSIS — C50.811 MALIGNANT NEOPLASM OF OVERLAPPING SITES OF RIGHT BREAST IN FEMALE, ESTROGEN RECEPTOR NEGATIVE: ICD-10-CM

## 2022-07-21 DIAGNOSIS — Z17.1 MALIGNANT NEOPLASM OF OVERLAPPING SITES OF RIGHT BREAST IN FEMALE, ESTROGEN RECEPTOR NEGATIVE: ICD-10-CM

## 2022-07-21 LAB
ALBUMIN SERPL-MCNC: 4.5 G/DL (ref 3.5–5.2)
ALBUMIN/GLOB SERPL: 1.6 G/DL
ALP SERPL-CCNC: 68 U/L (ref 39–117)
ALT SERPL W P-5'-P-CCNC: 15 U/L (ref 1–33)
ANION GAP SERPL CALCULATED.3IONS-SCNC: 7.4 MMOL/L (ref 5–15)
AST SERPL-CCNC: 25 U/L (ref 1–32)
BASOPHILS # BLD AUTO: 0.05 10*3/MM3 (ref 0–0.2)
BASOPHILS NFR BLD AUTO: 0.8 % (ref 0–1.5)
BILIRUB SERPL-MCNC: 0.4 MG/DL (ref 0–1.2)
BUN SERPL-MCNC: 21 MG/DL (ref 8–23)
BUN/CREAT SERPL: 23.1 (ref 7–25)
CALCIUM SPEC-SCNC: 9.9 MG/DL (ref 8.6–10.5)
CHLORIDE SERPL-SCNC: 103 MMOL/L (ref 98–107)
CO2 SERPL-SCNC: 30.6 MMOL/L (ref 22–29)
CREAT SERPL-MCNC: 0.91 MG/DL (ref 0.57–1)
DEPRECATED RDW RBC AUTO: 69.3 FL (ref 37–54)
EGFRCR SERPLBLD CKD-EPI 2021: 71.9 ML/MIN/1.73
EOSINOPHIL # BLD AUTO: 0.23 10*3/MM3 (ref 0–0.4)
EOSINOPHIL NFR BLD AUTO: 3.5 % (ref 0.3–6.2)
ERYTHROCYTE [DISTWIDTH] IN BLOOD BY AUTOMATED COUNT: 19 % (ref 12.3–15.4)
GLOBULIN UR ELPH-MCNC: 2.8 GM/DL
GLUCOSE SERPL-MCNC: 86 MG/DL (ref 65–99)
HCT VFR BLD AUTO: 44.9 % (ref 34–46.6)
HGB BLD-MCNC: 14.4 G/DL (ref 12–15.9)
IMM GRANULOCYTES # BLD AUTO: 0.01 10*3/MM3 (ref 0–0.05)
IMM GRANULOCYTES NFR BLD AUTO: 0.2 % (ref 0–0.5)
LYMPHOCYTES # BLD AUTO: 1.17 10*3/MM3 (ref 0.7–3.1)
LYMPHOCYTES NFR BLD AUTO: 17.6 % (ref 19.6–45.3)
MAGNESIUM SERPL-MCNC: 2.2 MG/DL (ref 1.6–2.4)
MCH RBC QN AUTO: 31.6 PG (ref 26.6–33)
MCHC RBC AUTO-ENTMCNC: 32.1 G/DL (ref 31.5–35.7)
MCV RBC AUTO: 98.5 FL (ref 79–97)
MONOCYTES # BLD AUTO: 0.7 10*3/MM3 (ref 0.1–0.9)
MONOCYTES NFR BLD AUTO: 10.5 % (ref 5–12)
NEUTROPHILS NFR BLD AUTO: 4.48 10*3/MM3 (ref 1.7–7)
NEUTROPHILS NFR BLD AUTO: 67.4 % (ref 42.7–76)
NRBC BLD AUTO-RTO: 0 /100 WBC (ref 0–0.2)
PHOSPHATE SERPL-MCNC: 3.7 MG/DL (ref 2.5–4.5)
PLATELET # BLD AUTO: 251 10*3/MM3 (ref 140–450)
PMV BLD AUTO: 10.5 FL (ref 6–12)
POTASSIUM SERPL-SCNC: 5.2 MMOL/L (ref 3.5–5.2)
PROT SERPL-MCNC: 7.3 G/DL (ref 6–8.5)
RBC # BLD AUTO: 4.56 10*6/MM3 (ref 3.77–5.28)
SODIUM SERPL-SCNC: 141 MMOL/L (ref 136–145)
WBC NRBC COR # BLD: 6.64 10*3/MM3 (ref 3.4–10.8)

## 2022-07-21 PROCEDURE — 83735 ASSAY OF MAGNESIUM: CPT

## 2022-07-21 PROCEDURE — 85025 COMPLETE CBC W/AUTO DIFF WBC: CPT

## 2022-07-21 PROCEDURE — 36415 COLL VENOUS BLD VENIPUNCTURE: CPT

## 2022-07-21 PROCEDURE — 84100 ASSAY OF PHOSPHORUS: CPT

## 2022-07-21 PROCEDURE — 80053 COMPREHEN METABOLIC PANEL: CPT

## 2022-07-22 ENCOUNTER — SPECIALTY PHARMACY (OUTPATIENT)
Dept: PHARMACY | Facility: HOSPITAL | Age: 62
End: 2022-07-22

## 2022-07-25 RX ORDER — TRAMADOL HYDROCHLORIDE 50 MG/1
TABLET ORAL
Qty: 90 TABLET | Refills: 0 | Status: SHIPPED | OUTPATIENT
Start: 2022-07-25 | End: 2022-09-09

## 2022-07-28 ENCOUNTER — TELEMEDICINE (OUTPATIENT)
Dept: PSYCHIATRY | Facility: CLINIC | Age: 62
End: 2022-07-28

## 2022-07-28 DIAGNOSIS — F41.1 GENERALIZED ANXIETY DISORDER: Primary | ICD-10-CM

## 2022-07-28 PROCEDURE — 90834 PSYTX W PT 45 MINUTES: CPT | Performed by: COUNSELOR

## 2022-07-28 NOTE — PROGRESS NOTES
Date: July 28, 2022  Time In: 1415  Time Out: 1451  This provider is located at the [facility]), [address], [place] [address] using a secure [employer] Video Visit through [employer]. Patient is being seen remotely via telehealth at home address in [place] and stated they are in a secure environment for this session. The patient's condition being diagnosed/treated is appropriate for telemedicine. The provider identified herself as well as her credentials. The patient, and/or patients guardian, consent to be seen remotely, and when consent is given they understand that the consent allows for patient identifiable information to be sent to a third party as needed. They may refuse to be seen remotely at any time. The electronic data is encrypted and password protected, and the patient and/or guardian has been advised of the potential risks to privacy not withstanding such measures.     You have chosen to receive care through a telehealth visit.  Do you consent to use a video/audio connection for your medical care today? Yes    PROGRESS NOTE  Data:  Martha Davey is a 61 y.o. female who presents today for follow up    Chief Complaint: Anxiety     History of Present Illness: Pt reports 's health worsening since previous session. Pt reports that son's behaviors remain difficult but have improved since obtaining a job. PT reports that she does feel isolated at times but reports she did connect with an old friend and had a positive lunch date together. Pt discussed relationship strain with one of her older friendships but reports that it is slowing improving. Pt discussed multiple stressors outside of her control and her efforts to identify that. Pt also discussed impact of mother recalling difficult and traumatic experiences.       Clinical Maneuvering/Intervention:    (Scales based on 0 - 10 with 10 being the worst)  Depression: 5 Anxiety: 5        Assisted patient in processing above session content; acknowledged and normalized patient’s thoughts, feelings, and concerns.  Rationalized patient thought process regarding stressors.  Discussed triggers associated with patient's anxiety.  Also discussed coping skills for patient to implement such as focusing on one's reaction to stressors outside of one's control. Normalized end of life reflection of her mother's.     Allowed patient to freely discuss issues without interruption or judgment. Provided safe, confidential environment to facilitate the development of positive therapeutic relationship and encourage open, honest communication. Assisted patient in identifying risk factors which would indicate the need for higher level of care including thoughts to harm self or others and/or self-harming behavior and encouraged patient to contact this office, call 911, or present to the nearest emergency room should any of these events occur. Discussed crisis intervention services and means to access. Patient adamantly and convincingly denies current suicidal or homicidal ideation or perceptual disturbance.    Assessment:   Assessment   Patient appears to maintain relative stability as compared to their baseline.  However, patient continues to struggle with anxiety which continues to cause impairment in important areas of functioning.  A result, they can be reasonably expected to continue to benefit from treatment and would likely be at increased risk for decompensation otherwise.    Mental Status Exam:   Hygiene:   good  Cooperation:  Cooperative  Eye Contact:  Good  Psychomotor Behavior:  Appropriate  Affect:  Full range  Mood: normal  Speech:  Normal  Thought Process:  Linear  Thought Content:  Normal  Suicidal:  None  Homicidal:  None  Hallucinations:  None  Delusion:  None  Memory:  Intact  Orientation:  Person, Place, Time and Situation  Reliability:  fair  Insight:  Fair  Judgement:  Fair  Impulse Control:   Fair  Physical/Medical Issues:  No        Patient's Support Network Includes:      Functional Status: Mild impairment     Progress toward goal: Not at goal    Prognosis: Fair with Ongoing Treatment          Plan:    Patient will continue in individual outpatient therapy with focus on improved functioning and coping skills, maintaining stability, and avoiding decompensation and the need for higher level of care.    Patient will adhere to medication regimen as prescribed and report any side effects. Patient will contact this office, call 911 or present to the nearest emergency room should suicidal or homicidal ideations occur. Provide Cognitive Behavioral Therapy and Solution Focused Therapy to improve functioning, maintain stability, and avoid decompensation and the need for higher level of care.     Return in about 3 weeks, or earlier if symptoms worsen or fail to improve.           VISIT DIAGNOSIS:     ICD-10-CM ICD-9-CM   1. Generalized anxiety disorder  F41.1 300.02          [facility] No Show Policy:  We understand unexpected circumstances arise; however, anytime you miss your appointment we are unable to provide you appropriate care.  In addition, each appointment missed could have been used to provide care for others.  We ask that you call at least 24 hours in advance to cancel or reschedule an appointment.  We would like to take this opportunity to remind you of our policy stating patients who miss THREE or more appointments without cancelling or rescheduling 24 hours in advance of the appointment may be subject to cancellation of any further visits with our clinic and recommendation to seek in-person services/visits.    Please call [phone] to reschedule your appointment. If there are reasons that make it difficult for you to keep the appointments, please call and let us know how we can help.  Please understand that medication prescribing will not continue without seeing  your provider.      [facility]'s No Show Policy reviewed with patient at today's visit. Patient verbalized understanding of this policy. Discussed with patient that in the event that there are three or more no show visits, it will be recommended that they pursue in-person services/visits as noncompliance with telehealth visits indicates that patient is not an appropriate candidate for telemedicine and would likely be more appropriate for in-person services/visits. Patient verbalizes understanding and is agreeable to this.        This document has been electronically signed by Sophia Barron LCSW  July 28, 2022 14:54 EDT      Part of this note may be an electronic transcription/translation of spoken language to printed text using the Dragon Dictation System.

## 2022-08-02 ENCOUNTER — HOSPITAL ENCOUNTER (OUTPATIENT)
Dept: CT IMAGING | Facility: HOSPITAL | Age: 62
Discharge: HOME | End: 2022-08-02
Admitting: INTERNAL MEDICINE

## 2022-08-02 ENCOUNTER — HOSPITAL ENCOUNTER (OUTPATIENT)
Dept: NUCLEAR MEDICINE | Facility: HOSPITAL | Age: 62
Discharge: HOME | End: 2022-08-02

## 2022-08-02 DIAGNOSIS — C50.811 MALIGNANT NEOPLASM OF OVERLAPPING SITES OF RIGHT BREAST IN FEMALE, ESTROGEN RECEPTOR NEGATIVE: ICD-10-CM

## 2022-08-02 DIAGNOSIS — Z17.1 MALIGNANT NEOPLASM OF OVERLAPPING SITES OF RIGHT BREAST IN FEMALE, ESTROGEN RECEPTOR NEGATIVE: ICD-10-CM

## 2022-08-02 LAB — CREAT BLDA-MCNC: 0.9 MG/DL (ref 0.6–1.3)

## 2022-08-02 PROCEDURE — 78306 BONE IMAGING WHOLE BODY: CPT

## 2022-08-02 PROCEDURE — 71260 CT THORAX DX C+: CPT

## 2022-08-02 PROCEDURE — A9503 TC99M MEDRONATE: HCPCS | Performed by: INTERNAL MEDICINE

## 2022-08-02 PROCEDURE — 0 TECHNETIUM MEDRONATE KIT: Performed by: INTERNAL MEDICINE

## 2022-08-02 PROCEDURE — 74177 CT ABD & PELVIS W/CONTRAST: CPT

## 2022-08-02 PROCEDURE — 82565 ASSAY OF CREATININE: CPT

## 2022-08-02 PROCEDURE — 25010000002 IOPAMIDOL 61 % SOLUTION: Performed by: INTERNAL MEDICINE

## 2022-08-02 RX ORDER — TC 99M MEDRONATE 20 MG/10ML
23.5 INJECTION, POWDER, LYOPHILIZED, FOR SOLUTION INTRAVENOUS
Status: COMPLETED | OUTPATIENT
Start: 2022-08-02 | End: 2022-08-02

## 2022-08-02 RX ADMIN — IOPAMIDOL 100 ML: 612 INJECTION, SOLUTION INTRAVENOUS at 09:27

## 2022-08-02 RX ADMIN — Medication 23.5 MILLICURIE: at 08:23

## 2022-08-04 ENCOUNTER — LAB (OUTPATIENT)
Dept: LAB | Facility: HOSPITAL | Age: 62
End: 2022-08-04

## 2022-08-04 DIAGNOSIS — R19.7 DIARRHEA, UNSPECIFIED TYPE: ICD-10-CM

## 2022-08-04 DIAGNOSIS — D84.821 IMMUNODEFICIENCY DUE TO DRUGS (CODE): ICD-10-CM

## 2022-08-04 DIAGNOSIS — R19.7 DIARRHEA, UNSPECIFIED TYPE: Primary | ICD-10-CM

## 2022-08-04 LAB
ADV 40+41 DNA STL QL NAA+NON-PROBE: NOT DETECTED
ASTRO TYP 1-8 RNA STL QL NAA+NON-PROBE: NOT DETECTED
C CAYETANENSIS DNA STL QL NAA+NON-PROBE: NOT DETECTED
C COLI+JEJ+UPSA DNA STL QL NAA+NON-PROBE: NOT DETECTED
C DIFF TOX GENS STL QL NAA+PROBE: NEGATIVE
CRYPTOSP DNA STL QL NAA+NON-PROBE: NOT DETECTED
E HISTOLYT DNA STL QL NAA+NON-PROBE: NOT DETECTED
EAEC PAA PLAS AGGR+AATA ST NAA+NON-PRB: NOT DETECTED
EC STX1+STX2 GENES STL QL NAA+NON-PROBE: NOT DETECTED
EPEC EAE GENE STL QL NAA+NON-PROBE: DETECTED
ETEC LTA+ST1A+ST1B TOX ST NAA+NON-PROBE: NOT DETECTED
G LAMBLIA DNA STL QL NAA+NON-PROBE: NOT DETECTED
NOROVIRUS GI+II RNA STL QL NAA+NON-PROBE: NOT DETECTED
P SHIGELLOIDES DNA STL QL NAA+NON-PROBE: NOT DETECTED
RVA RNA STL QL NAA+NON-PROBE: NOT DETECTED
S ENT+BONG DNA STL QL NAA+NON-PROBE: NOT DETECTED
SAPO I+II+IV+V RNA STL QL NAA+NON-PROBE: NOT DETECTED
SHIGELLA SP+EIEC IPAH ST NAA+NON-PROBE: NOT DETECTED
V CHOL+PARA+VUL DNA STL QL NAA+NON-PROBE: NOT DETECTED
V CHOLERAE DNA STL QL NAA+NON-PROBE: NOT DETECTED
Y ENTEROCOL DNA STL QL NAA+NON-PROBE: NOT DETECTED

## 2022-08-04 PROCEDURE — 87493 C DIFF AMPLIFIED PROBE: CPT | Performed by: INTERNAL MEDICINE

## 2022-08-04 PROCEDURE — 87507 IADNA-DNA/RNA PROBE TQ 12-25: CPT | Performed by: INTERNAL MEDICINE

## 2022-08-05 ENCOUNTER — OFFICE VISIT (OUTPATIENT)
Dept: PSYCHIATRY | Facility: HOSPITAL | Age: 62
End: 2022-08-05

## 2022-08-05 DIAGNOSIS — F33.1 MAJOR DEPRESSIVE DISORDER, RECURRENT EPISODE, MODERATE: ICD-10-CM

## 2022-08-05 DIAGNOSIS — F41.1 GENERALIZED ANXIETY DISORDER: ICD-10-CM

## 2022-08-05 DIAGNOSIS — R53.0 NEOPLASTIC MALIGNANT RELATED FATIGUE: Primary | ICD-10-CM

## 2022-08-05 PROCEDURE — 99214 OFFICE O/P EST MOD 30 MIN: CPT | Performed by: NURSE PRACTITIONER

## 2022-08-05 RX ORDER — MODAFINIL 200 MG/1
200 TABLET ORAL DAILY
Qty: 30 TABLET | Refills: 2 | Status: SHIPPED | OUTPATIENT
Start: 2022-08-05 | End: 2022-08-30

## 2022-08-05 RX ORDER — DULOXETIN HYDROCHLORIDE 30 MG/1
30 CAPSULE, DELAYED RELEASE ORAL 2 TIMES DAILY
Qty: 180 CAPSULE | Refills: 1 | Status: SHIPPED | OUTPATIENT
Start: 2022-08-05 | End: 2023-01-06

## 2022-08-05 RX ORDER — AZITHROMYCIN 500 MG/1
500 TABLET, FILM COATED ORAL DAILY
Qty: 3 TABLET | Refills: 0 | Status: SHIPPED | OUTPATIENT
Start: 2022-08-05 | End: 2022-08-08

## 2022-08-05 NOTE — TELEPHONE ENCOUNTER
Phoned patient to inform her that her GI panel was positive for E. coli. Per Dr. Hancock, prescription for azithromycin 500 mg x 3 days sent to patient's pharmacy. Patient v/u and confirmed preferred pharmacy.
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
## 2022-08-07 NOTE — PROGRESS NOTES
Chief Complaint  Previous Stage Ib ([id number]) ER/ME positive, HER-2/peter negative right breast cancer with subsequent metastatic disease identified 10/8/2017, history of right pulmonary embolism, cancer related pain, chemotherapy-induced diarrhea, chemotherapy-induced mucositis, chemotherapy-induced hand-foot syndrome    Subjective        History of Present Illness  The patient returns today in follow-up continuing on oral Xeloda 1000 mg in the morning, 1500 mg in the evening for 7 days on followed by 7 days off as well as monthly Xgeva and Eliquis 5 mg twice daily.  Today the patient has CT scans and bone scan to review at 2-month interval from the prior studies and is due for Xgeva.  She is beginning a week off of Xeloda.  The patient did report last week when she was in for a visit with her  (who is a patient in our practice as well) that she was experiencing diarrhea.  Her  had tested positive for C. difficile and we did evaluate her stool as well.  Stool specimen on 8/4/2022 was negative for C. difficile but was positive for GI PCR with enteropathogenic E. coli.  Given the patient's diarrhea and immunocompromise status, we elected to treat this with azithromycin 500 mg daily x3 days.  She did complete this yesterday and notes that there has been some improvement in her mild diarrhea.  She reports overall that she has done quite well since her last visit here.  She is tolerating Xeloda reasonably well, her hand-foot symptoms are stable to improved.  She has more significant symptoms in her hands and has developed over time sclerodactyly and extension of involvement into the dorsal aspect of her hands to some extent.  Currently she is using Aquaphor alone but does have triamcinolone to use for 2-week periods of time when her symptoms flare and this had been prescribed by her dermatologist previously.  She has not been requiring the triamcinolone recently.  She does have some chronic shoulder pain  "which waxes and wanes and she does use Ultram as needed, notes that she has been using this slightly more frequently with changes in the weather.  Her nausea has improved.  She has not followed up with GI in regards to the previous biopsy findings on upper GI that indicated possible celiac disease.  She thought she was going in for a celiac panel to be drawn recently but I cannot find that that has been performed in the system.  She does not have follow-up with Dr. Bedolla who she had seen in the hospital although has previously seen Dr. Michelle soto in the outpatient setting in the past.  She does continue follow-up with supportive oncology, continues on Provigil 100 mg daily, Cymbalta 30 mg twice daily, gabapentin 300 mg twice daily, temazepam 15 mg nightly as needed.  Patient notes that she does remain reasonably active, does try to walk for exercise on a regular basis.  She does have some physical limitations chronically, uses a cane for assistance when walking and does wear her braces on her bilateral lower extremities routinely.  She and her  are preparing to go on a trip out of town to [place] in the next few days for a week.      Objective   Vital Signs:   /86   Pulse 79   Temp 97.1 °F (36.2 °C) (Temporal)   Resp 16   Ht 157 cm (61.81\")   Wt 83.1 kg (183 lb 4.8 oz)   SpO2 96%   BMI 33.73 kg/m²     Physical Exam  Constitutional:       Appearance: She is well-developed.      Comments: Patient ambulates with the use of a cane   Eyes:      Conjunctiva/sclera: Conjunctivae normal.   Neck:      Thyroid: No thyromegaly.   Cardiovascular:      Rate and Rhythm: Normal rate and regular rhythm.      Heart sounds: No murmur heard.    No friction rub. No gallop.   Pulmonary:      Effort: No respiratory distress.      Breath sounds: Normal breath sounds.   Chest:   Breasts:      Right: No supraclavicular adenopathy.      Left: No supraclavicular adenopathy.       Abdominal:      General: Bowel sounds are " normal. There is no distension.      Palpations: Abdomen is soft.      Tenderness: There is no abdominal tenderness.   Musculoskeletal:      Comments: Braces in place in the bilateral lower extremities   Lymphadenopathy:      Head:      Right side of head: No submandibular adenopathy.      Cervical: No cervical adenopathy.      Upper Body:      Right upper body: No supraclavicular adenopathy.      Left upper body: No supraclavicular adenopathy.   Skin:     General: Skin is warm and dry.      Findings: Rash present.      Comments: Erythema on the palms bilaterally extending to minor extent onto the dorsal aspect of the hand and fingers.  There is not significant skin peeling currently.  There is mild sclerodactyly.   Neurological:      Mental Status: She is alert and oriented to person, place, and time.      Cranial Nerves: No cranial nerve deficit.      Motor: No abnormal muscle tone.      Deep Tendon Reflexes: Reflexes normal.   Psychiatric:         Behavior: Behavior normal.         Result Review : Reviewed CBC, CMP, magnesium from today.  Reviewed CT chest abdomen pelvis and bone scan from 8/2/2022.     Assessment and Plan     1. Previous Stage Ib ([id number]) right breast cancer:  · Diagnosed May 2010 with excisional biopsy for invasive ductal carcinoma, 1.3 cm, grade 2, ER 90%, MS 80%, HER-2/peter negative (1+ IHC).    · Subsequent right mastectomy in July 2010 with no residual breast malignancy, 1/5 sentinel lymph node with micrometastasis (0.25 mm).    · Treated in the [facility] system with adjuvant AC ×4 cycles in 2010 (no taxanes administered due to underlying Charcot-Saloni-Tooth with peripheral neuropathy).    · Adjuvant Femara (postmenopausal) initiated October 2010 with plan to treat ×10 years.    · Genetic testing reportedly negative.    · Developed osteopenia treated with Prolia beginning 2/27/13. Subsequently discontinued due to identification of metastatic disease.  2. Recurrent/metastatic disease  identified 10/8/17:  · Disease involving thoracic spine with cord compression at T6, lumbosacral involvement, sternal and right sternoclavicular involvement.    · Femara discontinued in 10/2017.    · Radiation administered (in the [facility] system) to the thoracic spine beginning 10/19/17 treating T3-T9 to a dose of 24 gray in 6 fractions.  · Evaluation with MRI 12/8/17 showing persistent T6 cord compression with persistent neurologic compromise requiring surgical treatment 12/11/17 with T6 laminectomy/corpectomy and T3-T9 fusion.  Pathology with metastatic carcinoma of breast origin, ER negative, IA negative, HER-2/peter negative (1+ IHC).  · Additional staging evaluation 12/8/17 with no evidence of visceral metastatic disease, bone scan showing involvement of thoracic spine, sternum, left humerus, mid frontal bone.  No plane film correlate of left humerus lesion.  MRI lumbar spine with small intradural L3 metastasis.  CA 15-3 12/6/17- 17.  · Palliative radiation therapy to L3 dural metastasis and left humerus initiated 1/15/18 (10 fractions), completed 1/26/18.  · Hypercalcemia of malignancy with calcium in the 10-11 range.  · Initiation of monthly Xgeva 1/23/18.  · Baseline CT scan 1/30/18 with no evidence of visceral involvement.  Cluster of nodular opacities in the right lower lobe suspected to be infectious or related to bronchiolitis. Bone scan 1/30/18 showed postsurgical change in the thoracic spine, stable uptake in the frontal bone, no new areas of disease.  · Initiation of palliative oral single agent Xeloda 2/7/18 2000 mg a.m., 1500 mg p.m. for 14/21 days.   · Following 3 cycles xeloda, bone scan 4/4/18 showed no change from the prior study.  CT scan 4/4/18 showed a small pericardial effusion of unclear significance as well as a subcutaneous nodule in the right lateral chest wall.  Subsequent evaluation with echocardiogram 4/17/18 showed no evidence of pericardial effusion.  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  We discussed the possibility of surgical excision of the right subcutaneous chest wall lesion for more definitive diagnosis.  Reviewed previous CT images dating back to 12/8/17 and the lesion was present even at that time measuring around 1.7 cm although not commented on in the radiology report.  As this appears to be an indolent low-grade process unrelated to her breast cancer, recommendee foregoing surgical excision at this time and monitoring this area on future scans.  The patient agreed.    · Following 6 cycles of Xeloda, CT 6/6/18  showed stable findings, no evidence of progressive disease.  There was a comment regarding subcutaneous abnormality in the anterior abdominal wall and this was related to Lovenox injection sites.  Bone scan 6/6/18 showed no interval change.   · CT scan and bone scan 8/13/18 following 9 cycles of Xeloda showed stable findings with no evidence of significant visceral metastases.  Her bone lesions appear stable on bone scan.  The spindle cell neoplasm in her right chest wall actually decreased in size from 2 cm down to 1.6 cm.    · The patient experienced some symptoms of diarrhea, anorexia, generalized weakness during cycle 9 Xeloda it was unclear whether this was related to a viral gastroenteritis or toxicity from treatment.  Symptoms recurred during cycle 10 and treatment was cut short by 2 days.  Symptoms attributed to Xeloda.  With cycle 11, dose and schedule altered to 1500 mg twice daily for 7 days on followed by 7 days off .  · CT scan 9/9/2020 with no significant changes.  Bone scan 9/9/2020 read as unchanged from prior studies however did note an area of slight activity in the medial left femur.  Contacted radiology and although this was not noted on prior reports appears to have been present.  Subsequent MRI left femur  9/21/2020 with cortical thickening and periosteal edema left iliac us muscle insertion to the medial left femur with no evidence of metastatic disease, favored to represent periosteal change secondary to insertional tendinitis.  · Severe hand-foot symptoms causing sclerodactyly and limitation in finger movement prompted change in dosing in July 2021 with Xeloda decreased to 1000 mg a.m., 1500 mg p.m. for 7 days on followed by 7 days off.  · Patient was experiencing severe hand-foot syndrome related to Xeloda having developed skin peeling, sclerodactyly with limited use of her hands.  On 3/31/2022, Xeloda was held to allow for recovery.  Patient resumed Xeloda on 4/18/2022.  · Patient required hospitalization 5/29 - 6/1/2022 with presumed viral gastroenteritis that was exacerbated by Xeloda.  Xeloda held briefly, resumed on 6/6/2022.  · Most recent interval scans from 8/2/2022 with CT chest abdomen pelvis that showed stable findings including left supraclavicular lymph node, right lower lobe pulmonary nodules, bony lesions.  Bone scan with stable findings.  · The patient returns today continuing on treatment with Xeloda 1000 mg a.m., 1500 mg p.m. 7 days on followed by 7 days off (beginning off week today).  She also continues on monthly Xgeva that is due today.  We are reviewing scans obtained at a 2-month interval from 8/2/2022.  Fortunately scans show stable findings in relation to her bony metastatic disease on both CT and bone scan.  Previous scan showed questionable nodules in the right lower lobe and left supraclavicular lymph node that is increased slightly but remain normal in size.  These findings are stable on the current scan and is unclear whether they are even related to her underlying malignancy.  She continues to tolerate treatment reasonably well.  Hand-foot syndrome is under reasonable control.  Her sclerodactyly is stable and only mildly affects her dexterity.  We will continue on with treatment as  planned.  We discussed 3-month interval for repeat scans.  We will continue for now and Xgeva on a monthly basis.  She will have NP visit in 4 weeks, MD visit in 8 weeks and again in 12 weeks and just prior to that visit we will repeat her scans.  We will consider possibly at the next visit changing her Xeloda to every 3-month dosing given the timeframe she has remained on the medication.  3. Right pulmonary embolism:  · Diagnosed on CT angiogram 10/21/17 involving small right lower lobe pulmonary artery.  Lower extremity Dopplers negative.  · Bilateral lower extremity Dopplers negative again 12/5/17.  · Received chronic Lovenox 1 mg/kg twice per day, transition to oral Eliquis in February 2019, continuing on Eliquis 5 mg twice daily.  · Patient continues on anticoagulation without any complications.  4. Cancer related pain:  · Previously receiving Duragesic 50 µg patch every 72 hours along with Dilaudid 4 mg as needed for breakthrough pain  · The patient's pain improved over time and she was able to discontinue both Duragesic and Dilaudid in the interval.  · Patient does take occasional Flexeril at bedtime due to back spasm/pain when she has been more active.  · The patient does have some occasional aggravation of her chronic back pain and does use tramadol 50 mg every 8 hours as needed  · Patient's pain is stable.  She does continue to use tramadol 50 mg every 8-12 hours as needed which has been effective.  The patient does have chronic difficulty with pain involving her low back, left shoulder.  Patient notes that her pain does wax and wane.  Physical therapy did help.  5. Chemotherapy-induced diarrhea with subsequent C. difficile colitis in the setting of previous ulcerative colitis and then identification of enteropathogenic E. coli:  · Patient with reported history of ulcerative colitis, continues on mesalamine.  · The patient developed initial diarrhea related to Xeloda at regular dosing.  · Symptoms improved  on reduced dose Xeloda  · Flare of symptoms in October 2018 with apparent finding of C. difficile colitis by GI, treated with course of oral vancomycin with improvement in symptoms.  · Patient notes minimal intermittent diarrhea/loose stools on Xeloda requiring occasional dosing of Imodium.    · Patient required hospitalization 5/29 - 6/1/2022 with presumed viral gastroenteritis that was exacerbated by Xeloda.  Xeloda held briefly, resumed on 6/6/2022.  · Patient's  developed C. difficile colitis and patient was experiencing increase in diarrhea.  Stool studies performed 8/4/22 negative C. difficile however GI PCR was positive for enteropathogenic E. coli.  Given patient's symptoms and immunocompromise status it was elected to treat her with azithromycin 500 mg daily x3 days beginning 8/5/2022  · Patient completed azithromycin yesterday and reports that diarrhea has improved.  6. Traumatic left tibia/fibular fracture:  · Status post ORIF 12/6/17  · Specimen was sent for pathologic review, negative for evidence of malignancy  7. Hypercalcemia:  · Suspect hypercalcemia of malignancy, calcium in  10-11 range previously.  · Calcium normalized following initiation of monthly Xgeva on 1/23/18.  8. Chemotherapy-induced mucositis:  · Patient had a minimal degree of mucositis with cycle 2.  The patient has magic mouthwash to use as needed.  No subsequent mucositis.  9. Recurrent UTI, bladder wall thickening on CT:  · Patient had an enterococcal UTI on 3/2/18 sensitive to nitrofurantoin and received treatment, unclear how long.  · Recurrent UTI 3/20/18 with urine culture growing Klebsiella, initially treated with nitrofurantoin, transitioned to Levaquin.  · CT 4/4/18 with diffuse bladder wall thickening with increased nodular thickening at the left base.  Referral to urogynecology Dr. May Johnson.  She was placed on a prophylactic dose of trimethoprim 100 mg daily, bladder wall thickening felt to be related to recent  recurrent urinary tract infections.  · Patient with urinary symptoms, treated with course of Macrobid at the end of December 2018, urine culture however was negative 12/31/18.  · Patient was found to have Klebsiella UTI 7/29/2019 which was successfully treated with Macrobid with complete resolution of symptoms.  · CT 8/10/2021 with diffuse bladder wall thickening new from 5/17/2021 (however seen on multiple prior scans).    · UTI on 10/18/2021 with E. coli greater than 100,000 colonies that was pansensitive, treated with Macrobid x7 days  · With ongoing/recurrent UTIs patient was seen by urogynecology and initiated suppressive therapy with trimethoprim 100 mg daily in December 2021.  She has not experienced any further urinary tract infections.  · CT abdomen pelvis 3/28/2022 does show diffuse thickening of urinary bladder as has been seen on prior studies indicative of cystitis.  · No recent urinary tract infections continuing on trimethoprim 100 mg daily.  Patient notes that she is not entirely compliant with daily trimethoprim and does take this on an intermittent basis.  She has a follow-up in the near future with urogynecology.  10.   Mobility:  · The patient underwent an intensive course of [facility].  She graduated from her outpatient course November 2018.  · Overall the patient has improved dramatically in terms of mobility,   · Patient has been continuing to walk for exercise on a regular basis.  11.  Depression:  · The patient is continuing follow-up in the supportive oncology clinic and is currently continuing on Cymbalta 30 mg twice daily as well as gabapentin 300 mg at dinner and 300 mg nightly, temazepam 15 mg nightly as needed, Provigil 100 mg daily.  · Patient does continue to attend support groups at [employer].  · The patient does continue routine follow-up in supportive oncology clinic.    · Patient does have multiple stressors with her 's malignancy and chemotherapy as  well as her autistic son who is living with them at home.  · Patient last seen by supportive oncology 8/5/2022  12. 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  Symptoms did improve and she continued on the same dose.    · Patient was referred to dermatology and has been continuing on triamcinolone 0.1% cream used for 1 week on followed by 1 week off which led to some further improvement.   · Progressive palmar erythema with development of sclerodactyly and effect on dexterity.  Addition of urea-based cream 3 times daily.  · Patient with continued difficulty regarding erythema of her hands that extended onto the dorsal aspect and was causing contractures/sclerodactyly in her fingers affecting her dexterity.  In July 2021, held Xeloda for an additional week and then reduced dose from 1500 mg twice daily down to 1000 mg a.m. and 1500 mg p.m. and continued on a 7-day on followed by 7-day off schedule.  · With reduction in Xeloda dose, slight improvement in erythema involving dorsal aspect of the hands and slight decrease in sclerodactyly  · Patient was experiencing severe hand-foot syndrome related to Xeloda having developed skin peeling, sclerodactyly with limited use of her hands.  On 3/31/2022, Xeloda was held to allow for recovery.  Patient resumed Xeloda on 4/18/2022.  · Patient continues to use emollient cream frequently (currently 5 times daily) and topical triamcinolone 0.1% twice daily intermittently (2 weeks on followed by 2 weeks off as instructed by dermatology).  Symptoms are somewhat improved currently with decrease in erythema and minimal skin peeling.  Erythema does extend onto the dorsal aspect of the hands as it has in the past although the extent of this has decreased.  She has not been using the triamcinolone recently,  only the emollient cream.  13. Evidence of steatosis on scans with mild intermittent elevated liver function studies:  · Liver function studies increased 8/20/19 with ALT 98, AST 70, normal total bilirubin.  · Negative viral hepatitis A, B, and C panel 8/23/18  · Likely related to hepatic steatosis.   · Subsequent improvement in LFTs  · LFTs today are normal  14. Chemotherapy induced leukopenia:  · WBC today  is slightly low at 3.37 however ANC normal at 1.71.  She has just completed a week of Xeloda.  15. GERD, question of celiac disease:  · Patient with significant history of reflux  · Patient currently receiving Protonix 40 mg daily daily and Carafate 1 g 4 times daily as needed  · Patient required hospitalization 5/29 - 6/1/2022 with presumed viral gastroenteritis that was exacerbated by Xeloda.    · EGD performed 5/31/2022 during hospitalization with biopsy that showed focal blunting of villi and atrophy with slight increase in intraepithelial lymphocytes.  Changes were minimal but recommended correlation with serology to exclude celiac disease.  · Patient thought serology had been performed for celiac disease however I cannot find that this was ever sent.  We will send the celiac panel today and I did ask her to follow-up with GI.  She usually sees Dr. Ocasio had followed up with Dr. Bedolla after her recent hospitalization.  16. Insomnia:  · Patient with prior paradoxical reaction to Benadryl  · Improved previously on temazepam 15 mg nightly as needed.   · Patient noted subsequently that temazepam was having no effect.   · Symptoms currently stable on gabapentin 300 mg at dinner and 300 mg nightly as well as temazepam 15 mg nightly as needed  17. Health maintenance:  · Patient notes that she has a history of colon polyps as well as ulcerative colitis and was due for a follow-up colonoscopy on 9/12/2019.  We did discuss there is no necessity to pursue colonoscopy in the setting of her metastatic breast cancer.     · The patient did undergo colonoscopy on 2/7/2020 with findings of muscular hypertrophy and diverticulosis.   · See above discussion, note patient plans to proceed with colonoscopy in the future, being rescheduled  18. Right shoulder pain:  · Patient was evaluated by Dr Forrester.  She has experienced difficulty over a long time frame with right shoulder although she has not complained of this in prior visits to our office.  She reported difficulty with abduction.    · The patient did undergo MRI of her right shoulder on 11/21/2019 at an outside facility showing multiple abnormalities including supraspinatus tendinosis, labral tear but no evidence of metastatic disease.  · Patient developed pain in the left shoulder, was seen by orthopedics and underwent steroid injection with some improvement.  Right shoulder is stable currently.  Patient did undergo physical therapy with some improvement.  She continues to use tramadol 50 mg every 8-12 hours as needed.  19. Ocular changes in part related to Xeloda:  · Patient experienced a mild degree of blurred vision as well as burning and pruritus.  · She was seen by her ophthalmologist and was placed on xiidra ophthalmic drops which have helped.  · Likely both issues are to some extent related to Xeloda.  · Symptoms did worsen and patient was seen by a new ophthalmologist at [facility].  She is now using an ocular lubricant at night in addition to artificial tears which have helped.  20. Elevated glucose:  · It is noted the patient's glucose at the last few visits has been in the high 100 range, postprandial.  · She has had a hemoglobin A1c that has been in the high 5-6 range in the past, on 5/1/2019 at 5.7  · Hemoglobin A1c was last checked in 11/12/2021 at 5.8  21. Possible loosening of pedicle screw at T3:  · CT cervical spine 5/17/2021 showed loosening of the left pedicle screw at T3.  Patient referred to orthopedics and was seen by Dr. Nayak who felt that  there were no concerning findings on the scan  22. COVID-19 vaccination  · Patient received the Pfizer vaccine, second dose administered on 4/2/2021 without side effects.  · Patient received her third dose Pfizer COVID-19 injection in August 2021  23. Iron deficiency anemia  · Labs on 5/2/2022 with hemoglobin 10.8.  Additional labs with iron 48, ferritin 21.2, iron saturation 11%, TIBC 4 and 32, folate greater than 20, B12 greater than 2000.    · Attempted treatment with oral iron daily however patient was intolerant  · Patient subsequently received Venofer 300 mg weekly x4 beginning 6/6/2022 and completed on 6/27/2022  · Subsequent iron studies on 7/11/2022 showed improvement with iron 62, ferritin 345, iron saturation 18%, TIBC 336.  Hemoglobin had improved up to 12.7 at that time.  · Hemoglobin has normalized after IV iron, currently 13.2.  We will repeat iron studies in 8 weeks.     Plan:  1. Continue palliative single agent Xeloda 1000 mg a.m., 1500 mg in the p.m. 7 days on followed by 7 days off (currently beginning a week off)  2. Proceed with monthly Xgeva today  3. Continue Eliquis 5 mg twice daily  4. The patient will continue frequent use of emollient cream currently 5 times daily and add continue periodic triamcinolone cream 0.1% twice daily (provided by dermatology) 2 weeks on followed by 2 weeks off as prescribed (asked patient to resume this today)  5. Continue Protonix 40 mg daily  6. Continue Carafate 1 g 4 times daily  as needed  7. We will send off labs today with celiac panel and encouraged patient to follow-up with GI.  8. Continue ocular lubricating gel nightly per ophthalmology  9. Continue Provigil 100 mg daily and Cymbalta 30 mg twice daily, gabapentin 300 mg at dinner and 300 mg at night per supportive oncology clinic.  Patient continues routine follow-up with supportive oncology (last seen on 8/5/2022).  10. Continue temazepam 15 mg nightly.  11. Patient continues on trimethoprim 100 mg  daily that was prescribed by urogynecology for ongoing suppressive therapy for UTIs.  Patient reports she is taking this intermittently.  12. In 4 weeks nurse practitioner visit with CBC, CMP, magnesium, phosphorus and Xgeva  13. In 8 weeks MD visit with CBC, CMP, magnesium, phosphorus, stat iron panel, ferritin and Xgeva  14. In 11 weeks CT chest abdomen pelvis and bone scan  15. In 12 weeks MD visit with CBC, CMP, magnesium, phosphorus and Xgeva     Patient continuing on high risk medication requiring intensive monitoring.       I did spend 45 minutes in total time caring for the patient today, time spent in review of records, face-to-face consultation, coordination of care, placement of orders, completion of documentation.

## 2022-08-08 ENCOUNTER — OFFICE VISIT (OUTPATIENT)
Dept: ONCOLOGY | Facility: CLINIC | Age: 62
End: 2022-08-08

## 2022-08-08 ENCOUNTER — INFUSION (OUTPATIENT)
Dept: ONCOLOGY | Facility: HOSPITAL | Age: 62
End: 2022-08-08

## 2022-08-08 ENCOUNTER — LAB (OUTPATIENT)
Dept: OTHER | Facility: HOSPITAL | Age: 62
End: 2022-08-08

## 2022-08-08 VITALS
WEIGHT: 183.3 LBS | TEMPERATURE: 97.1 F | BODY MASS INDEX: 33.73 KG/M2 | RESPIRATION RATE: 16 BRPM | HEART RATE: 79 BPM | SYSTOLIC BLOOD PRESSURE: 135 MMHG | HEIGHT: 62 IN | OXYGEN SATURATION: 96 % | DIASTOLIC BLOOD PRESSURE: 86 MMHG

## 2022-08-08 DIAGNOSIS — Z17.1 MALIGNANT NEOPLASM OF OVERLAPPING SITES OF RIGHT BREAST IN FEMALE, ESTROGEN RECEPTOR NEGATIVE: ICD-10-CM

## 2022-08-08 DIAGNOSIS — R11.2 NAUSEA AND VOMITING, UNSPECIFIED VOMITING TYPE: Primary | ICD-10-CM

## 2022-08-08 DIAGNOSIS — C50.811 MALIGNANT NEOPLASM OF OVERLAPPING SITES OF RIGHT BREAST IN FEMALE, ESTROGEN RECEPTOR NEGATIVE: ICD-10-CM

## 2022-08-08 DIAGNOSIS — Z17.1 MALIGNANT NEOPLASM OF OVERLAPPING SITES OF RIGHT BREAST IN FEMALE, ESTROGEN RECEPTOR NEGATIVE: Primary | ICD-10-CM

## 2022-08-08 DIAGNOSIS — C50.811 MALIGNANT NEOPLASM OF OVERLAPPING SITES OF RIGHT BREAST IN FEMALE, ESTROGEN RECEPTOR NEGATIVE: Primary | ICD-10-CM

## 2022-08-08 DIAGNOSIS — D50.8 OTHER IRON DEFICIENCY ANEMIA: ICD-10-CM

## 2022-08-08 LAB
ALBUMIN SERPL-MCNC: 4.3 G/DL (ref 3.5–5.2)
ALBUMIN/GLOB SERPL: 2 G/DL
ALP SERPL-CCNC: 58 U/L (ref 39–117)
ALT SERPL W P-5'-P-CCNC: 15 U/L (ref 1–33)
ANION GAP SERPL CALCULATED.3IONS-SCNC: 6.4 MMOL/L (ref 5–15)
AST SERPL-CCNC: 20 U/L (ref 1–32)
BASOPHILS # BLD AUTO: 0.05 10*3/MM3 (ref 0–0.2)
BASOPHILS NFR BLD AUTO: 1.5 % (ref 0–1.5)
BILIRUB SERPL-MCNC: 0.3 MG/DL (ref 0–1.2)
BUN SERPL-MCNC: 27 MG/DL (ref 8–23)
BUN/CREAT SERPL: 29 (ref 7–25)
CALCIUM SPEC-SCNC: 9.4 MG/DL (ref 8.6–10.5)
CHLORIDE SERPL-SCNC: 107 MMOL/L (ref 98–107)
CO2 SERPL-SCNC: 30.6 MMOL/L (ref 22–29)
CREAT SERPL-MCNC: 0.93 MG/DL (ref 0.57–1)
DEPRECATED RDW RBC AUTO: 68.1 FL (ref 37–54)
EGFRCR SERPLBLD CKD-EPI 2021: 70.1 ML/MIN/1.73
EOSINOPHIL # BLD AUTO: 0.2 10*3/MM3 (ref 0–0.4)
EOSINOPHIL NFR BLD AUTO: 5.9 % (ref 0.3–6.2)
ERYTHROCYTE [DISTWIDTH] IN BLOOD BY AUTOMATED COUNT: 18.4 % (ref 12.3–15.4)
GLOBULIN UR ELPH-MCNC: 2.2 GM/DL
GLUCOSE SERPL-MCNC: 98 MG/DL (ref 65–99)
HCT VFR BLD AUTO: 40.5 % (ref 34–46.6)
HGB BLD-MCNC: 13.2 G/DL (ref 12–15.9)
IMM GRANULOCYTES # BLD AUTO: 0.01 10*3/MM3 (ref 0–0.05)
IMM GRANULOCYTES NFR BLD AUTO: 0.3 % (ref 0–0.5)
LYMPHOCYTES # BLD AUTO: 1.05 10*3/MM3 (ref 0.7–3.1)
LYMPHOCYTES NFR BLD AUTO: 31.2 % (ref 19.6–45.3)
MAGNESIUM SERPL-MCNC: 2.1 MG/DL (ref 1.6–2.4)
MCH RBC QN AUTO: 32.5 PG (ref 26.6–33)
MCHC RBC AUTO-ENTMCNC: 32.6 G/DL (ref 31.5–35.7)
MCV RBC AUTO: 99.8 FL (ref 79–97)
MONOCYTES # BLD AUTO: 0.35 10*3/MM3 (ref 0.1–0.9)
MONOCYTES NFR BLD AUTO: 10.4 % (ref 5–12)
NEUTROPHILS NFR BLD AUTO: 1.71 10*3/MM3 (ref 1.7–7)
NEUTROPHILS NFR BLD AUTO: 50.7 % (ref 42.7–76)
NRBC BLD AUTO-RTO: 0 /100 WBC (ref 0–0.2)
PHOSPHATE SERPL-MCNC: 4 MG/DL (ref 2.5–4.5)
PLATELET # BLD AUTO: 218 10*3/MM3 (ref 140–450)
PMV BLD AUTO: 11.1 FL (ref 6–12)
POTASSIUM SERPL-SCNC: 4.8 MMOL/L (ref 3.5–5.2)
PROT SERPL-MCNC: 6.5 G/DL (ref 6–8.5)
RBC # BLD AUTO: 4.06 10*6/MM3 (ref 3.77–5.28)
SODIUM SERPL-SCNC: 144 MMOL/L (ref 136–145)
WBC NRBC COR # BLD: 3.37 10*3/MM3 (ref 3.4–10.8)

## 2022-08-08 PROCEDURE — 86364 TISS TRNSGLTMNASE EA IG CLAS: CPT | Performed by: INTERNAL MEDICINE

## 2022-08-08 PROCEDURE — 85025 COMPLETE CBC W/AUTO DIFF WBC: CPT | Performed by: INTERNAL MEDICINE

## 2022-08-08 PROCEDURE — 84100 ASSAY OF PHOSPHORUS: CPT | Performed by: INTERNAL MEDICINE

## 2022-08-08 PROCEDURE — 99215 OFFICE O/P EST HI 40 MIN: CPT | Performed by: INTERNAL MEDICINE

## 2022-08-08 PROCEDURE — 36415 COLL VENOUS BLD VENIPUNCTURE: CPT

## 2022-08-08 PROCEDURE — 83735 ASSAY OF MAGNESIUM: CPT | Performed by: INTERNAL MEDICINE

## 2022-08-08 PROCEDURE — 86231 EMA EACH IG CLASS: CPT | Performed by: INTERNAL MEDICINE

## 2022-08-08 PROCEDURE — 25010000002 DENOSUMAB 120 MG/1.7ML SOLUTION: Performed by: INTERNAL MEDICINE

## 2022-08-08 PROCEDURE — 80053 COMPREHEN METABOLIC PANEL: CPT | Performed by: INTERNAL MEDICINE

## 2022-08-08 PROCEDURE — 82784 ASSAY IGA/IGD/IGG/IGM EACH: CPT | Performed by: INTERNAL MEDICINE

## 2022-08-08 PROCEDURE — 96372 THER/PROPH/DIAG INJ SC/IM: CPT

## 2022-08-08 RX ORDER — TRIMETHOPRIM 100 MG/1
100 TABLET ORAL DAILY
COMMUNITY
Start: 2022-06-10

## 2022-08-08 RX ADMIN — DENOSUMAB 120 MG: 120 INJECTION SUBCUTANEOUS at 09:06

## 2022-08-09 LAB
ENDOMYSIUM IGA SER QL: NEGATIVE
IGA SERPL-MCNC: 157 MG/DL (ref 87–352)
TTG IGA SER-ACNC: <2 U/ML (ref 0–3)

## 2022-08-12 ENCOUNTER — TELEPHONE (OUTPATIENT)
Dept: ONCOLOGY | Facility: CLINIC | Age: 62
End: 2022-08-12

## 2022-08-12 NOTE — TELEPHONE ENCOUNTER
Left message for patient to inform her that her celiac panel was negative. Direct callback number provided in case of questions.

## 2022-08-18 ENCOUNTER — TELEMEDICINE (OUTPATIENT)
Dept: PSYCHIATRY | Facility: CLINIC | Age: 62
End: 2022-08-18

## 2022-08-18 DIAGNOSIS — F41.1 GENERALIZED ANXIETY DISORDER: Primary | ICD-10-CM

## 2022-08-18 PROCEDURE — 90832 PSYTX W PT 30 MINUTES: CPT | Performed by: COUNSELOR

## 2022-08-19 ENCOUNTER — SPECIALTY PHARMACY (OUTPATIENT)
Dept: PHARMACY | Facility: HOSPITAL | Age: 62
End: 2022-08-19

## 2022-08-19 NOTE — PROGRESS NOTES
Specialty Pharmacy Refill Coordination Note     Martha is a 61 y.o. female contacted today regarding refills of  Xeloda specialty medication(s).    Reviewed and verified with patient:       Specialty medication(s) and dose(s) confirmed: yes    Refill Questions    Flowsheet Row Most Recent Value   Changes to allergies? No   Changes to medications? No   New conditions since last clinic visit No   Unplanned office visit, urgent care, ED, or hospital admission in the last 4 weeks  No   How does patient/caregiver feel medication is working? Good   Financial problems or insurance changes  No   Since the previous refill, were any specialty medication doses or scheduled injections missed or delayed?  Yes   If yes, please provide the amount 1   Why were doses missed? on plane during vacation   Does this patient require a clinical escalation to a pharmacist? No          Delivery Questions    Flowsheet Row Most Recent Value   Delivery method [employer]  [[employer] priority sig required ship 8/23 deliver 8/24- ring doorbell]   Delivery address correct? Yes   Preferred delivery time? AM   Number of medications in delivery 1   Medication being filled and delivered Xeloda   Doses left of specialty medications 2 days- off week   Is there any medication that is due not being filled? No   Supplies needed? No supplies needed   Cooler needed? No   Do any medications need mixed or dated? No   Copay form of payment Payment plan already set up   Questions or concerns for the pharmacist? No   Are any medications first time fills? No                 Follow-up: 3  week(s)     Ami uHdson  Specialty Pharmacy Technician

## 2022-08-29 DIAGNOSIS — R53.0 NEOPLASTIC MALIGNANT RELATED FATIGUE: ICD-10-CM

## 2022-08-29 DIAGNOSIS — F33.1 MAJOR DEPRESSIVE DISORDER, RECURRENT EPISODE, MODERATE: ICD-10-CM

## 2022-08-30 RX ORDER — MODAFINIL 200 MG/1
TABLET ORAL
Qty: 30 TABLET | Refills: 2 | Status: SHIPPED | OUTPATIENT
Start: 2022-08-30 | End: 2022-12-28
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
## 2022-09-01 NOTE — PROGRESS NOTES
"Chief Complaint  Previous Stage Ib ([id number]) ER/NE positive, HER-2/peter negative right breast cancer with subsequent metastatic disease identified 10/8/2017, history of right pulmonary embolism, cancer related pain, chemotherapy-induced diarrhea, chemotherapy-induced mucositis, chemotherapy-induced hand-foot syndrome    Subjective        History of Present Illness  The patient returns today in follow-up continuing on oral Xeloda 1000 mg in the morning, 1500 mg in the evening for 7 days on followed by 7 days off as well as monthly Xgeva and Eliquis 5 mg twice daily.  Started her on week of Xeloda yesterday.  She has noticed some worsening erythema and dryness around her hands/fingers.  She is using Aquaphor once at nighttime.  She does have triamcinolone available to use for 2-week periods of time when her symptoms flare, prescribed by dermatology, however not currently using this.  Otherwise, she tolerates treatment reasonably well.  Eating and drinking adequately, weight remains stable.  Bowels moving regularly.  Pain remains well controlled on tramadol 50 mg as needed.  She continues to try and remain active.  She recently went to [place] with her , and they had a great time.  Today she is ambulating without her cane, though typically does use cane for assistance when walking along with wearing a brace on her bilateral lower extremities.  And denies fever or chills.  Denies nausea or vomiting.  Denies new or worsening pain.    Objective   Vital Signs:   /85   Pulse 98   Temp 97.3 °F (36.3 °C) (Temporal)   Resp 18   Ht 157 cm (61.81\")   Wt 84.5 kg (186 lb 3.2 oz)   SpO2 97%   BMI 34.27 kg/m²       Physical Exam  Constitutional:       Appearance: She is well-developed.      Comments: Patient ambulates with the use of a cane   Eyes:      Conjunctiva/sclera: Conjunctivae normal.   Neck:      Thyroid: No thyromegaly.   Cardiovascular:      Rate and Rhythm: Normal rate and regular rhythm.      " Heart sounds: No murmur heard.    No friction rub. No gallop.   Pulmonary:      Effort: No respiratory distress.      Breath sounds: Normal breath sounds.   Chest:   Breasts:      Right: No supraclavicular adenopathy.      Left: No supraclavicular adenopathy.       Abdominal:      General: Bowel sounds are normal. There is no distension.      Palpations: Abdomen is soft.      Tenderness: There is no abdominal tenderness.   Musculoskeletal:      Comments: Braces in place in the bilateral lower extremities   Lymphadenopathy:      Head:      Right side of head: No submandibular adenopathy.      Cervical: No cervical adenopathy.      Upper Body:      Right upper body: No supraclavicular adenopathy.      Left upper body: No supraclavicular adenopathy.   Skin:     General: Skin is warm and dry.      Findings: Rash present.      Comments: Erythema on the palms bilaterally extending to minor extent onto the dorsal aspect of the hand and fingers.  There is not significant skin peeling currently.  There is mild sclerodactyly.   Neurological:      Mental Status: She is alert and oriented to person, place, and time.      Cranial Nerves: No cranial nerve deficit.      Motor: No abnormal muscle tone.      Deep Tendon Reflexes: Reflexes normal.   Psychiatric:         Behavior: Behavior normal.         Result Review : Reviewed CBC, CMP, magnesium, phosphorus from today.     Assessment and Plan     1. Previous Stage Ib ([id number]) right breast cancer:  · Diagnosed May 2010 with excisional biopsy for invasive ductal carcinoma, 1.3 cm, grade 2, ER 90%, CT 80%, HER-2/peter negative (1+ IHC).    · Subsequent right mastectomy in July 2010 with no residual breast malignancy, 1/5 sentinel lymph node with micrometastasis (0.25 mm).    · Treated in the [facility] system with adjuvant AC ×4 cycles in 2010 (no taxanes administered due to underlying Charcot-Saloni-Tooth with peripheral neuropathy).    · Adjuvant Femara (postmenopausal) initiated  October 2010 with plan to treat ×10 years.    · Genetic testing reportedly negative.    · Developed osteopenia treated with Prolia beginning 2/27/13. Subsequently discontinued due to identification of metastatic disease.  2. Recurrent/metastatic disease identified 10/8/17:  · Disease involving thoracic spine with cord compression at T6, lumbosacral involvement, sternal and right sternoclavicular involvement.    · Femara discontinued in 10/2017.    · Radiation administered (in the [facility] system) to the thoracic spine beginning 10/19/17 treating T3-T9 to a dose of 24 gray in 6 fractions.  · Evaluation with MRI 12/8/17 showing persistent T6 cord compression with persistent neurologic compromise requiring surgical treatment 12/11/17 with T6 laminectomy/corpectomy and T3-T9 fusion.  Pathology with metastatic carcinoma of breast origin, ER negative, CT negative, HER-2/peter negative (1+ IHC).  · Additional staging evaluation 12/8/17 with no evidence of visceral metastatic disease, bone scan showing involvement of thoracic spine, sternum, left humerus, mid frontal bone.  No plane film correlate of left humerus lesion.  MRI lumbar spine with small intradural L3 metastasis.  CA 15-3 12/6/17- 17.  · Palliative radiation therapy to L3 dural metastasis and left humerus initiated 1/15/18 (10 fractions), completed 1/26/18.  · Hypercalcemia of malignancy with calcium in the 10-11 range.  · Initiation of monthly Xgeva 1/23/18.  · Baseline CT scan 1/30/18 with no evidence of visceral involvement.  Cluster of nodular opacities in the right lower lobe suspected to be infectious or related to bronchiolitis. Bone scan 1/30/18 showed postsurgical change in the thoracic spine, stable uptake in the frontal bone, no new areas of disease.  · Initiation of palliative oral single agent Xeloda 2/7/18 2000 mg a.m., 1500 mg p.m. for 14/21 days.   · Following 3 cycles xeloda, bone scan 4/4/18 showed no change from the prior study.  CT scan 4/4/18  showed a small pericardial effusion of unclear significance as well as a subcutaneous nodule in the right lateral chest wall.  Subsequent evaluation with echocardiogram 4/17/18 showed no evidence of pericardial effusion.  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  We discussed the possibility of surgical excision of the right subcutaneous chest wall lesion for more definitive diagnosis.  Reviewed previous CT images dating back to 12/8/17 and the lesion was present even at that time measuring around 1.7 cm although not commented on in the radiology report.  As this appears to be an indolent low-grade process unrelated to her breast cancer, recommendee foregoing surgical excision at this time and monitoring this area on future scans.  The patient agreed.    · Following 6 cycles of Xeloda, CT 6/6/18  showed stable findings, no evidence of progressive disease.  There was a comment regarding subcutaneous abnormality in the anterior abdominal wall and this was related to Lovenox injection sites.  Bone scan 6/6/18 showed no interval change.   · CT scan and bone scan 8/13/18 following 9 cycles of Xeloda showed stable findings with no evidence of significant visceral metastases.  Her bone lesions appear stable on bone scan.  The spindle cell neoplasm in her right chest wall actually decreased in size from 2 cm down to 1.6 cm.    · The patient experienced some symptoms of diarrhea, anorexia, generalized weakness during cycle 9 Xeloda it was unclear whether this was related to a viral gastroenteritis or toxicity from treatment.  Symptoms recurred during cycle 10 and treatment was cut short by 2 days.  Symptoms attributed to Xeloda.  With cycle 11, dose and schedule altered to 1500 mg twice daily for 7 days on followed by 7 days off .  · CT scan 9/9/2020 with no significant changes.   Bone scan 9/9/2020 read as unchanged from prior studies however did note an area of slight activity in the medial left femur.  Contacted radiology and although this was not noted on prior reports appears to have been present.  Subsequent MRI left femur 9/21/2020 with cortical thickening and periosteal edema left iliac us muscle insertion to the medial left femur with no evidence of metastatic disease, favored to represent periosteal change secondary to insertional tendinitis.  · Severe hand-foot symptoms causing sclerodactyly and limitation in finger movement prompted change in dosing in July 2021 with Xeloda decreased to 1000 mg a.m., 1500 mg p.m. for 7 days on followed by 7 days off.  · Patient was experiencing severe hand-foot syndrome related to Xeloda having developed skin peeling, sclerodactyly with limited use of her hands.  On 3/31/2022, Xeloda was held to allow for recovery.  Patient resumed Xeloda on 4/18/2022.  · Patient required hospitalization 5/29 - 6/1/2022 with presumed viral gastroenteritis that was exacerbated by Xeloda.  Xeloda held briefly, resumed on 6/6/2022.  · Most recent interval scans from 8/2/2022 with CT chest abdomen pelvis that showed stable findings including left supraclavicular lymph node, right lower lobe pulmonary nodules, bony lesions.  Bone scan with stable findings.  · CT scans 8/2/2022 show stable findings in relation to her bony metastatic disease on both CT and bone scan.  Previous scan showed questionable nodules in the right lower lobe and left supraclavicular lymph node that is increased slightly but remain normal in size.  These findings are stable on the current scan and is unclear whether they are even related to her underlying malignancy.     · 9/6/2022 The patient returns today continuing on treatment with Xeloda 1000 mg a.m., 1500 mg p.m. 7 days on followed by 7 days off (begin on week yesterday).  She also continues on monthly Xgeva that is due today.  She continues  to tolerate treatment reasonably well.  The patient does feel her hand-foot syndrome has slightly worsened, experiencing more erythema and dryness to her hands.  Sclerodactyl stable.  She is using Aquaphor once nightly.  We discussed increasing her use of Aquaphor and resuming triamcinolone that was previously prescribed by her dermatologist.  Continue treatment as planned and consider 3 month interval repeat scans. MD visit in 4 weeks and again in 8 weeks and just prior to 8 week visit will repeat her scans.  We will consider possibly at the next visit changing her Xeloda to every 3 month dosing given the timeframe she has remained on the medication.   3. Right pulmonary embolism:  · Diagnosed on CT angiogram 10/21/17 involving small right lower lobe pulmonary artery.  Lower extremity Dopplers negative.  · Bilateral lower extremity Dopplers negative again 12/5/17.  · Received chronic Lovenox 1 mg/kg twice per day, transition to oral Eliquis in February 2019, continuing on Eliquis 5 mg twice daily.  · Patient continues on anticoagulation without any complications.  4. Cancer related pain:  · Previously receiving Duragesic 50 µg patch every 72 hours along with Dilaudid 4 mg as needed for breakthrough pain  · The patient's pain improved over time and she was able to discontinue both Duragesic and Dilaudid in the interval.  · Patient does take occasional Flexeril at bedtime due to back spasm/pain when she has been more active.  · The patient does have some occasional aggravation of her chronic back pain and does use tramadol 50 mg every 8 hours as needed  · Patient's pain is stable.  She does continue to use tramadol 50 mg every 8-12 hours as needed which has been effective.  The patient does have chronic difficulty with pain involving her low back, left shoulder.  Patient notes that her pain does wax and wane.  Physical therapy did help.   · Martha PAIGE Petar reports a pain score of 4.  Given her pain assessment as  noted, treatment options were discussed and the following options were decided upon as a follow-up plan to address the patient's pain: continuation of current treatment plan for pain.  5. Chemotherapy-induced diarrhea with subsequent C. difficile colitis in the setting of previous ulcerative colitis and then identification of enteropathogenic E. coli:  · Patient with reported history of ulcerative colitis, continues on mesalamine.  · The patient developed initial diarrhea related to Xeloda at regular dosing.  · Symptoms improved on reduced dose Xeloda  · Flare of symptoms in October 2018 with apparent finding of C. difficile colitis by GI, treated with course of oral vancomycin with improvement in symptoms.  · Patient notes minimal intermittent diarrhea/loose stools on Xeloda requiring occasional dosing of Imodium.    · Patient required hospitalization 5/29 - 6/1/2022 with presumed viral gastroenteritis that was exacerbated by Xeloda.  Xeloda held briefly, resumed on 6/6/2022.  · Patient's  developed C. difficile colitis and patient was experiencing increase in diarrhea.  Stool studies performed 8/4/22 negative C. difficile however GI PCR was positive for enteropathogenic E. coli.  Given patient's symptoms and immunocompromise status it was elected to treat her with azithromycin 500 mg daily x3 days beginning 8/5/2022  · Patient completed azithromycin.  · 9/6/2022: Reports diarrhea has resolved.  6. Traumatic left tibia/fibular fracture:  · Status post ORIF 12/6/17  · Specimen was sent for pathologic review, negative for evidence of malignancy  7. Hypercalcemia:  · Suspect hypercalcemia of malignancy, calcium in  10-11 range previously.  · Calcium normalized following initiation of monthly Xgeva on 1/23/18.   8. Chemotherapy-induced mucositis:  · Patient had a minimal degree of mucositis with cycle 2.  The patient has magic mouthwash to use as needed.  No subsequent mucositis.  9. Recurrent UTI, bladder wall  thickening on CT:  · Patient had an enterococcal UTI on 3/2/18 sensitive to nitrofurantoin and received treatment, unclear how long.  · Recurrent UTI 3/20/18 with urine culture growing Klebsiella, initially treated with nitrofurantoin, transitioned to Levaquin.  · CT 4/4/18 with diffuse bladder wall thickening with increased nodular thickening at the left base.  Referral to urogynecology Dr. May Johnson.  She was placed on a prophylactic dose of trimethoprim 100 mg daily, bladder wall thickening felt to be related to recent recurrent urinary tract infections.  · Patient with urinary symptoms, treated with course of Macrobid at the end of December 2018, urine culture however was negative 12/31/18.  · Patient was found to have Klebsiella UTI 7/29/2019 which was successfully treated with Macrobid with complete resolution of symptoms.  · CT 8/10/2021 with diffuse bladder wall thickening new from 5/17/2021 (however seen on multiple prior scans).    · UTI on 10/18/2021 with E. coli greater than 100,000 colonies that was pansensitive, treated with Macrobid x7 days  · With ongoing/recurrent UTIs patient was seen by urogynecology and initiated suppressive therapy with trimethoprim 100 mg daily in December 2021.  She has not experienced any further urinary tract infections.  · CT abdomen pelvis 3/28/2022 does show diffuse thickening of urinary bladder as has been seen on prior studies indicative of cystitis.  · No recent urinary tract infections continuing on trimethoprim 100 mg daily.  Patient notes that she is not entirely compliant with daily trimethoprim and does take this on an intermittent basis.  She has a follow-up in the near future with urogynecology.  10.   Mobility:  · The patient underwent an intensive course of rehabilitation at [facility].  She graduated from her outpatient course November 2018.  · Overall the patient has improved dramatically in terms of mobility,   · Patient has been continuing to walk for  exercise on a regular basis.  11.  Depression:  · The patient is continuing follow-up in the supportive oncology clinic and is currently continuing on Cymbalta 30 mg twice daily as well as gabapentin 300 mg at dinner and 300 mg nightly, temazepam 15 mg nightly as needed, Provigil 100 mg daily.  · Patient does continue to attend support groups at [facility].  · The patient does continue routine follow-up in supportive oncology clinic.    · Patient does have multiple stressors with her 's malignancy and chemotherapy as well as her autistic son who is living with them at home.  · Patient last seen by supportive oncology 8/5/2022  12. 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  Symptoms did improve and she continued on the same dose.    · Patient was referred to dermatology and has been continuing on triamcinolone 0.1% cream used for 1 week on followed by 1 week off which led to some further improvement.   · Progressive palmar erythema with development of sclerodactyly and effect on dexterity.  Addition of urea-based cream 3 times daily.  · Patient with continued difficulty regarding erythema of her hands that extended onto the dorsal aspect and was causing contractures/sclerodactyly in her fingers affecting her dexterity.  In July 2021, held Xeloda for an additional week and then reduced dose from 1500 mg twice daily down to 1000 mg a.m. and 1500 mg p.m. and continued on a 7-day on followed by 7-day off schedule.  · With reduction in Xeloda dose, slight improvement in erythema involving dorsal aspect of the hands and slight decrease in sclerodactyly  · Patient was experiencing severe hand-foot syndrome related to Xeloda having developed skin peeling, sclerodactyly with limited use of her hands.  On 3/31/2022, Xeloda was held to  allow for recovery.  Patient resumed Xeloda on 4/18/2022.  · 9/6/2022: Patient does report mild worsening of hand-foot syndrome.  Admits to using Aquaphor only once at nighttime.  Not using topical triamcinolone 0.1%.  Advised her to increase Aquaphor to 3-4 times daily and resume topical triamcinolone 0.1% twice daily intermittently (2 weeks on followed by 2 weeks off as instructed by dermatology).  13. Evidence of steatosis on scans with mild intermittent elevated liver function studies:  · Liver function studies increased 8/20/19 with ALT 98, AST 70, normal total bilirubin.  · Negative viral hepatitis A, B, and C panel 8/23/18  · Likely related to hepatic steatosis.   · Subsequent improvement in LFTs  · LFTs today are normal   14. Chemotherapy induced leukopenia:  · WBC 3.84, ANC 2.11.  15. GERD, question of celiac disease:  · Patient with significant history of reflux  · Patient currently receiving Protonix 40 mg daily daily and Carafate 1 g 4 times daily as needed  · Patient required hospitalization 5/29 - 6/1/2022 with presumed viral gastroenteritis that was exacerbated by Xeloda.    · EGD performed 5/31/2022 during hospitalization with biopsy that showed focal blunting of villi and atrophy with slight increase in intraepithelial lymphocytes.  Changes were minimal but recommended correlation with serology to exclude celiac disease.  · Patient thought serology had been performed for celiac disease however I cannot find that this was ever sent.  We will send the celiac panel today and I did ask her to follow-up with GI.  She usually sees Dr. Ocasio had followed up with Dr. Bedolla after her recent hospitalization.  16. Insomnia:  · Patient with prior paradoxical reaction to Benadryl  · Improved previously on temazepam 15 mg nightly as needed.   · Patient noted subsequently that temazepam was having no effect.   · Symptoms currently stable on gabapentin 300 mg at dinner and 300 mg nightly as well as temazepam 15 mg  nightly as needed  17. Health maintenance:  · Patient notes that she has a history of colon polyps as well as ulcerative colitis and was due for a follow-up colonoscopy on 9/12/2019.  We did discuss there is no necessity to pursue colonoscopy in the setting of her metastatic breast cancer.    · The patient did undergo colonoscopy on 2/7/2020 with findings of muscular hypertrophy and diverticulosis.   · See above discussion, note patient plans to proceed with colonoscopy in the future, being rescheduled  18. Right shoulder pain:  · Patient was evaluated by Dr Forrester.  She has experienced difficulty over a long time frame with right shoulder although she has not complained of this in prior visits to our office.  She reported difficulty with abduction.    · The patient did undergo MRI of her right shoulder on 11/21/2019 at an outside facility showing multiple abnormalities including supraspinatus tendinosis, labral tear but no evidence of metastatic disease.  · Patient developed pain in the left shoulder, was seen by orthopedics and underwent steroid injection with some improvement.  Right shoulder is stable currently.  Patient did undergo physical therapy with some improvement.  She continues to use tramadol 50 mg every 8-12 hours as needed.  19. Ocular changes in part related to Xeloda:  · Patient experienced a mild degree of blurred vision as well as burning and pruritus.  · She was seen by her ophthalmologist and was placed on xiidra ophthalmic drops which have helped.  · Likely both issues are to some extent related to Xeloda.  · Symptoms did worsen and patient was seen by a new ophthalmologist at [facility].  She is now using an ocular lubricant at night in addition to artificial tears which have helped.  20. Elevated glucose:  · It is noted the patient's glucose at the last few visits has been in the high 100 range, postprandial.  · She has had a hemoglobin A1c that has been in the high 5-6  range in the past, on 5/1/2019 at 5.7  · Hemoglobin A1c was last checked in 11/12/2021 at 5.8  21. Possible loosening of pedicle screw at T3:  · CT cervical spine 5/17/2021 showed loosening of the left pedicle screw at T3.  Patient referred to orthopedics and was seen by Dr. Nayak who felt that there were no concerning findings on the scan  22. COVID-19 vaccination  · Patient received the Pfizer vaccine, second dose administered on 4/2/2021 without side effects.  · Patient received her third dose Pfizer COVID-19 injection in August 2021  23. Iron deficiency anemia  · Labs on 5/2/2022 with hemoglobin 10.8.  Additional labs with iron 48, ferritin 21.2, iron saturation 11%, TIBC 4 and 32, folate greater than 20, B12 greater than 2000.    · Attempted treatment with oral iron daily however patient was intolerant  · Patient subsequently received Venofer 300 mg weekly x4 beginning 6/6/2022 and completed on 6/27/2022  · Subsequent iron studies on 7/11/2022 showed improvement with iron 62, ferritin 345, iron saturation 18%, TIBC 336.  Hemoglobin had improved up to 12.7 at that time.  · Hemoglobin has normalized after IV iron, currently 13.6.  We will repeat iron studies in 4 weeks.      Plan:   1. Continue palliative single agent Xeloda 1000 mg a.m., 1500 mg in the p.m. 7 days on followed by 7 days off (currently beginning a week on)   2. Proceed with monthly Xgeva today   3. Continue Eliquis 5 mg twice daily  4. The patient will resume frequent use of emollient cream currently 5 times daily and add continue periodic triamcinolone cream 0.1% twice daily (provided by dermatology) 2 weeks on followed by 2 weeks off as prescribed (asked patient to resume this today)  5. Continue Protonix 40 mg daily  6. Continue Carafate 1 g 4 times daily  as needed  7. Encouraged continued follow up with GI.  8. Continue ocular lubricating gel nightly per ophthalmology  9. Continue Provigil 100 mg daily and Cymbalta 30 mg twice daily,  gabapentin 300 mg at dinner and 300 mg at night per supportive oncology clinic.  Patient continues routine follow-up with supportive oncology (last seen on 8/18/2022).  10. Continue temazepam 15 mg nightly.  11. Patient continues on trimethoprim 100 mg daily that was prescribed by urogynecology for ongoing suppressive therapy for UTIs.  Patient reports she is taking this intermittently.  12. In 4 weeks MD visit with CBC, CMP, magnesium, phosphorus, stat iron panel, ferritin and Xgeva  13. In 7 weeks CT chest abdomen pelvis and bone scan  14. In 8 weeks MD visit with CBC, CMP, magnesium, phosphorus and Xgeva     Patient continuing on high risk medication requiring intensive monitoring.       Reva Galo, LISA  09/06/22

## 2022-09-06 ENCOUNTER — OFFICE VISIT (OUTPATIENT)
Dept: ONCOLOGY | Facility: CLINIC | Age: 62
End: 2022-09-06

## 2022-09-06 ENCOUNTER — INFUSION (OUTPATIENT)
Dept: ONCOLOGY | Facility: HOSPITAL | Age: 62
End: 2022-09-06

## 2022-09-06 ENCOUNTER — LAB (OUTPATIENT)
Dept: OTHER | Facility: HOSPITAL | Age: 62
End: 2022-09-06

## 2022-09-06 VITALS
WEIGHT: 186.2 LBS | HEIGHT: 62 IN | OXYGEN SATURATION: 97 % | DIASTOLIC BLOOD PRESSURE: 85 MMHG | TEMPERATURE: 97.3 F | RESPIRATION RATE: 18 BRPM | HEART RATE: 98 BPM | SYSTOLIC BLOOD PRESSURE: 137 MMHG | BODY MASS INDEX: 34.27 KG/M2

## 2022-09-06 DIAGNOSIS — G89.3 CANCER ASSOCIATED PAIN: ICD-10-CM

## 2022-09-06 DIAGNOSIS — C50.811 MALIGNANT NEOPLASM OF OVERLAPPING SITES OF RIGHT BREAST IN FEMALE, ESTROGEN RECEPTOR NEGATIVE: Primary | ICD-10-CM

## 2022-09-06 DIAGNOSIS — C79.51 BONE METASTASES: ICD-10-CM

## 2022-09-06 DIAGNOSIS — L27.1 HAND FOOT SYNDROME: ICD-10-CM

## 2022-09-06 DIAGNOSIS — Z17.1 MALIGNANT NEOPLASM OF OVERLAPPING SITES OF RIGHT BREAST IN FEMALE, ESTROGEN RECEPTOR NEGATIVE: Primary | ICD-10-CM

## 2022-09-06 DIAGNOSIS — C50.811 MALIGNANT NEOPLASM OF OVERLAPPING SITES OF RIGHT BREAST IN FEMALE, ESTROGEN RECEPTOR NEGATIVE: ICD-10-CM

## 2022-09-06 DIAGNOSIS — Z17.1 MALIGNANT NEOPLASM OF OVERLAPPING SITES OF RIGHT BREAST IN FEMALE, ESTROGEN RECEPTOR NEGATIVE: ICD-10-CM

## 2022-09-06 DIAGNOSIS — Z79.899 HIGH RISK MEDICATION USE: ICD-10-CM

## 2022-09-06 LAB
ALBUMIN SERPL-MCNC: 4.1 G/DL (ref 3.5–5.2)
ALBUMIN/GLOB SERPL: 1.5 G/DL
ALP SERPL-CCNC: 74 U/L (ref 39–117)
ALT SERPL W P-5'-P-CCNC: 13 U/L (ref 1–33)
ANION GAP SERPL CALCULATED.3IONS-SCNC: 7.9 MMOL/L (ref 5–15)
AST SERPL-CCNC: 21 U/L (ref 1–32)
BASOPHILS # BLD AUTO: 0.05 10*3/MM3 (ref 0–0.2)
BASOPHILS NFR BLD AUTO: 1.3 % (ref 0–1.5)
BILIRUB SERPL-MCNC: 0.3 MG/DL (ref 0–1.2)
BUN SERPL-MCNC: 30 MG/DL (ref 8–23)
BUN/CREAT SERPL: 31.3 (ref 7–25)
CALCIUM SPEC-SCNC: 9.8 MG/DL (ref 8.6–10.5)
CHLORIDE SERPL-SCNC: 103 MMOL/L (ref 98–107)
CO2 SERPL-SCNC: 30.1 MMOL/L (ref 22–29)
CREAT SERPL-MCNC: 0.96 MG/DL (ref 0.57–1)
DEPRECATED RDW RBC AUTO: 61.4 FL (ref 37–54)
EGFRCR SERPLBLD CKD-EPI 2021: 67.5 ML/MIN/1.73
EOSINOPHIL # BLD AUTO: 0.24 10*3/MM3 (ref 0–0.4)
EOSINOPHIL NFR BLD AUTO: 6.3 % (ref 0.3–6.2)
ERYTHROCYTE [DISTWIDTH] IN BLOOD BY AUTOMATED COUNT: 16.5 % (ref 12.3–15.4)
GLOBULIN UR ELPH-MCNC: 2.7 GM/DL
GLUCOSE SERPL-MCNC: 101 MG/DL (ref 65–99)
HCT VFR BLD AUTO: 41.5 % (ref 34–46.6)
HGB BLD-MCNC: 13.6 G/DL (ref 12–15.9)
IMM GRANULOCYTES # BLD AUTO: 0.01 10*3/MM3 (ref 0–0.05)
IMM GRANULOCYTES NFR BLD AUTO: 0.3 % (ref 0–0.5)
LYMPHOCYTES # BLD AUTO: 1.01 10*3/MM3 (ref 0.7–3.1)
LYMPHOCYTES NFR BLD AUTO: 26.3 % (ref 19.6–45.3)
MAGNESIUM SERPL-MCNC: 1.8 MG/DL (ref 1.6–2.4)
MCH RBC QN AUTO: 32.9 PG (ref 26.6–33)
MCHC RBC AUTO-ENTMCNC: 32.8 G/DL (ref 31.5–35.7)
MCV RBC AUTO: 100.2 FL (ref 79–97)
MONOCYTES # BLD AUTO: 0.42 10*3/MM3 (ref 0.1–0.9)
MONOCYTES NFR BLD AUTO: 10.9 % (ref 5–12)
NEUTROPHILS NFR BLD AUTO: 2.11 10*3/MM3 (ref 1.7–7)
NEUTROPHILS NFR BLD AUTO: 54.9 % (ref 42.7–76)
NRBC BLD AUTO-RTO: 0 /100 WBC (ref 0–0.2)
PHOSPHATE SERPL-MCNC: 3.7 MG/DL (ref 2.5–4.5)
PLATELET # BLD AUTO: 213 10*3/MM3 (ref 140–450)
PMV BLD AUTO: 10.9 FL (ref 6–12)
POTASSIUM SERPL-SCNC: 4.9 MMOL/L (ref 3.5–5.2)
PROT SERPL-MCNC: 6.8 G/DL (ref 6–8.5)
RBC # BLD AUTO: 4.14 10*6/MM3 (ref 3.77–5.28)
SODIUM SERPL-SCNC: 141 MMOL/L (ref 136–145)
WBC NRBC COR # BLD: 3.84 10*3/MM3 (ref 3.4–10.8)

## 2022-09-06 PROCEDURE — 80053 COMPREHEN METABOLIC PANEL: CPT | Performed by: INTERNAL MEDICINE

## 2022-09-06 PROCEDURE — 84100 ASSAY OF PHOSPHORUS: CPT | Performed by: INTERNAL MEDICINE

## 2022-09-06 PROCEDURE — 99214 OFFICE O/P EST MOD 30 MIN: CPT | Performed by: NURSE PRACTITIONER

## 2022-09-06 PROCEDURE — 25010000002 DENOSUMAB 120 MG/1.7ML SOLUTION: Performed by: NURSE PRACTITIONER

## 2022-09-06 PROCEDURE — 83735 ASSAY OF MAGNESIUM: CPT | Performed by: INTERNAL MEDICINE

## 2022-09-06 PROCEDURE — 85025 COMPLETE CBC W/AUTO DIFF WBC: CPT | Performed by: INTERNAL MEDICINE

## 2022-09-06 PROCEDURE — 96372 THER/PROPH/DIAG INJ SC/IM: CPT

## 2022-09-06 PROCEDURE — 36415 COLL VENOUS BLD VENIPUNCTURE: CPT

## 2022-09-06 RX ADMIN — DENOSUMAB 120 MG: 120 INJECTION SUBCUTANEOUS at 11:04

## 2022-09-06 NOTE — NURSING NOTE
Pt arrived for xgeva injection with no complaints or concerns voiced at this time.  Injection administered without incidence. F/u appt reviewed with pt and instructed to call the office for any concerns prior to next appt. Pt vu and discharged in stable condition.

## 2022-09-06 NOTE — PROGRESS NOTES
Capecitabine rx faxed to [facility] [phone]  Phone: [phone]-phys. Line  [phone]-Pt. Line   Recommend trial of oral clotrimazole (over the counter antifungal cream) topically twice daily for 2 weeks or so for your lips (angular cheilitis).     Nitrofurantoin twice daily for 5 days for urinary infection. Urine culture will take about 2 days to result. Sometimes we need to make changes based on what grows on urine culture.

## 2022-09-09 RX ORDER — TRAMADOL HYDROCHLORIDE 50 MG/1
TABLET ORAL
Qty: 90 TABLET | Refills: 0 | Status: SHIPPED | OUTPATIENT
Start: 2022-09-09 | End: 2022-10-27

## 2022-09-12 ENCOUNTER — DOCUMENTATION (OUTPATIENT)
Dept: ONCOLOGY | Facility: CLINIC | Age: 62
End: 2022-09-12

## 2022-09-12 NOTE — PROGRESS NOTES
[facility] started a PA for pts Sucralfate suspension through covermymeds. I have completed the request and submitted to [facility]. Waiting for a decision.     Dara Green  Specialty Pharmacy Technician

## 2022-09-13 ENCOUNTER — TELEPHONE (OUTPATIENT)
Dept: ONCOLOGY | Facility: CLINIC | Age: 62
End: 2022-09-13

## 2022-09-13 RX ORDER — TRAMADOL HYDROCHLORIDE 50 MG/1
TABLET ORAL
Qty: 90 TABLET | OUTPATIENT
Start: 2022-09-13

## 2022-09-13 RX ORDER — SUCRALFATE 1 G/1
1 TABLET ORAL 4 TIMES DAILY
Qty: 120 TABLET | Refills: 3 | Status: SHIPPED | OUTPATIENT
Start: 2022-09-13 | End: 2022-10-03

## 2022-09-15 ENCOUNTER — SPECIALTY PHARMACY (OUTPATIENT)
Dept: PHARMACY | Facility: HOSPITAL | Age: 62
End: 2022-09-15

## 2022-09-15 NOTE — PROGRESS NOTES
Specialty Pharmacy Refill Coordination Note     Martha is a 61 y.o. female contacted today regarding refills of  CAPECITABINE specialty medication(s).    Reviewed and verified with patient:       Specialty medication(s) and dose(s) confirmed: yes    Refill Questions    Flowsheet Row Most Recent Value   Changes to allergies? No   Changes to medications? No   New conditions since last clinic visit No   Unplanned office visit, urgent care, ED, or hospital admission in the last 4 weeks  No   How does patient/caregiver feel medication is working? Good   Financial problems or insurance changes  No   Since the previous refill, were any specialty medication doses or scheduled injections missed or delayed?  No   If yes, please provide the amount N/A   Why were doses missed? N/A   Does this patient require a clinical escalation to a pharmacist? No          Delivery Questions    Flowsheet Row Most Recent Value   Delivery method [employer]  [[facility] priority - sig on 9/27 to arrive 9/28. Address confirmed. $0 co-pay.]   Delivery address correct? Yes   Delivery phone number [phone]   Preferred delivery time? AM   Number of medications in delivery 1   Medication being filled and delivered CAPECITABINE   Doses left of specialty medications 1 WEEKS WORTH   Is there any medication that is due not being filled? No   Supplies needed? No supplies needed   Cooler needed? No   Do any medications need mixed or dated? No   Copay form of payment Payment plan already set up   Additional comments N/A   Questions or concerns for the pharmacist? No   Explain any questions or concerns for the pharmacist N/A   Are any medications first time fills? No                 Follow-up: 21 day(s)     Yolanda Meyers, Pharmacy Technician  Specialty Pharmacy Technician

## 2022-09-22 ENCOUNTER — SPECIALTY PHARMACY (OUTPATIENT)
Dept: PHARMACY | Facility: HOSPITAL | Age: 62
End: 2022-09-22

## 2022-09-22 NOTE — PROGRESS NOTES
"Put thru [employer] in [facility] with notes \"[employer] priority sig required ship 9/27 deliver 9/28\"  "

## 2022-09-27 ENCOUNTER — TELEMEDICINE (OUTPATIENT)
Dept: PSYCHIATRY | Facility: CLINIC | Age: 62
End: 2022-09-27

## 2022-09-27 DIAGNOSIS — F41.1 GENERALIZED ANXIETY DISORDER: Primary | ICD-10-CM

## 2022-09-27 PROCEDURE — 90832 PSYTX W PT 30 MINUTES: CPT | Performed by: COUNSELOR

## 2022-09-27 NOTE — PROGRESS NOTES
Date: September 27, 2022  Time In: 1415  Time Out: 1445  This provider is located at the [facility]), [address], [place] [address] using a secure [employer] Video Visit through [employer]. Patient is being seen remotely via telehealth at home address in [place] and stated they are in a secure environment for this session. The patient's condition being diagnosed/treated is appropriate for telemedicine. The provider identified herself as well as her credentials. The patient, and/or patients guardian, consent to be seen remotely, and when consent is given they understand that the consent allows for patient identifiable information to be sent to a third party as needed. They may refuse to be seen remotely at any time. The electronic data is encrypted and password protected, and the patient and/or guardian has been advised of the potential risks to privacy not withstanding such measures.     You have chosen to receive care through a telehealth visit.  Do you consent to use a video/audio connection for your medical care today? Yes    PROGRESS NOTE  Data:  Martha Davey is a 61 y.o. female who presents today for follow up    Chief Complaint: depression      History of Present Illness: Discussed 's health and changed behaviors. Pt expressed that she feels alone and isolated as if she is on an island. Pt explains that if she was to discuss her emotions with others they seem to miniamize and remind Pt that her  has cancer. Pt reports that she does have outlets; such as going for a walk but states she needs to hold herself accountable to this outlet as well as the need to form new outlets that would allow her time to herself.       Clinical Maneuvering/Intervention:    (Scales based on 0 - 10 with 10 being the worst)  Depression: 5 Anxiety: 5     Assisted patient in processing above session content; acknowledged and normalized patient’s thoughts,  feelings, and concerns.  Rationalized patient thought process regarding frustrations and feeling unsupported.  Discussed triggers associated with patient's mood.  Also discussed coping skills for patient to implement such as self care and forming sustainable methods. Also discussed grief and loss and expressed that though  is still with us presently Pt seems to be mourning the man she  due to multiple changed behaviors caused by 's illness.     Allowed patient to freely discuss issues without interruption or judgment. Provided safe, confidential environment to facilitate the development of positive therapeutic relationship and encourage open, honest communication. Assisted patient in identifying risk factors which would indicate the need for higher level of care including thoughts to harm self or others and/or self-harming behavior and encouraged patient to contact this office, call 911, or present to the nearest emergency room should any of these events occur. Discussed crisis intervention services and means to access. Patient adamantly and convincingly denies current suicidal or homicidal ideation or perceptual disturbance.    Assessment:   Assessment   Patient appears to maintain relative stability as compared to their baseline.  However, patient continues to struggle with anxiety which continues to cause impairment in important areas of functioning.  A result, they can be reasonably expected to continue to benefit from treatment and would likely be at increased risk for decompensation otherwise.    Mental Status Exam:   Hygiene:   good  Cooperation:  Cooperative  Eye Contact:  Good  Psychomotor Behavior:  Appropriate  Affect:  Appropriate  Mood: normal  Speech:  Normal  Thought Process:  Linear  Thought Content:  Normal  Suicidal:  None  Homicidal:  None  Hallucinations:  None  Delusion:  None  Memory:  Intact  Orientation:  Person, Place, Time and Situation  Reliability:  fair  Insight:   Fair  Judgement:  Fair  Impulse Control:  Fair  Physical/Medical Issues:  No        Patient's Support Network Includes:  extended family    Functional Status: Mild impairment     Progress toward goal: Not at goal    Prognosis: Fair with Ongoing Treatment          Plan:    Patient will continue in individual outpatient therapy with focus on improved functioning and coping skills, maintaining stability, and avoiding decompensation and the need for higher level of care.    Patient will adhere to medication regimen as prescribed and report any side effects. Patient will contact this office, call 911 or present to the nearest emergency room should suicidal or homicidal ideations occur. Provide Cognitive Behavioral Therapy and Solution Focused Therapy to improve functioning, maintain stability, and avoid decompensation and the need for higher level of care.     Return in about 3 weeks, or earlier if symptoms worsen or fail to improve.           VISIT DIAGNOSIS:     ICD-10-CM ICD-9-CM   1. Generalized anxiety disorder  F41.1 300.02          [facility] No Show Policy:  We understand unexpected circumstances arise; however, anytime you miss your appointment we are unable to provide you appropriate care.  In addition, each appointment missed could have been used to provide care for others.  We ask that you call at least 24 hours in advance to cancel or reschedule an appointment.  We would like to take this opportunity to remind you of our policy stating patients who miss THREE or more appointments without cancelling or rescheduling 24 hours in advance of the appointment may be subject to cancellation of any further visits with our clinic and recommendation to seek in-person services/visits.    Please call [phone] to reschedule your appointment. If there are reasons that make it difficult for you to keep the appointments, please call and let us know how we can help.  Please understand that  medication prescribing will not continue without seeing your provider.      [facility]'s No Show Policy reviewed with patient at today's visit. Patient verbalized understanding of this policy. Discussed with patient that in the event that there are three or more no show visits, it will be recommended that they pursue in-person services/visits as noncompliance with telehealth visits indicates that patient is not an appropriate candidate for telemedicine and would likely be more appropriate for in-person services/visits. Patient verbalizes understanding and is agreeable to this.        This document has been electronically signed by Sophia Barron LCSW  September 27, 2022 14:55 EDT      Part of this note may be an electronic transcription/translation of spoken language to printed text using the Dragon Dictation System.

## 2022-10-03 ENCOUNTER — OFFICE VISIT (OUTPATIENT)
Dept: GASTROENTEROLOGY | Facility: CLINIC | Age: 62
End: 2022-10-03

## 2022-10-03 ENCOUNTER — LAB (OUTPATIENT)
Dept: OTHER | Facility: HOSPITAL | Age: 62
End: 2022-10-03

## 2022-10-03 ENCOUNTER — PREP FOR SURGERY (OUTPATIENT)
Dept: SURGERY | Facility: SURGERY CENTER | Age: 62
End: 2022-10-03

## 2022-10-03 ENCOUNTER — INFUSION (OUTPATIENT)
Dept: ONCOLOGY | Facility: HOSPITAL | Age: 62
End: 2022-10-03

## 2022-10-03 ENCOUNTER — OFFICE VISIT (OUTPATIENT)
Dept: ONCOLOGY | Facility: CLINIC | Age: 62
End: 2022-10-03

## 2022-10-03 VITALS
TEMPERATURE: 98.4 F | BODY MASS INDEX: 34.3 KG/M2 | HEIGHT: 62 IN | HEART RATE: 91 BPM | RESPIRATION RATE: 18 BRPM | WEIGHT: 186.4 LBS | SYSTOLIC BLOOD PRESSURE: 134 MMHG | OXYGEN SATURATION: 96 % | DIASTOLIC BLOOD PRESSURE: 84 MMHG

## 2022-10-03 VITALS
TEMPERATURE: 97.9 F | WEIGHT: 186.5 LBS | DIASTOLIC BLOOD PRESSURE: 84 MMHG | OXYGEN SATURATION: 98 % | SYSTOLIC BLOOD PRESSURE: 168 MMHG | HEIGHT: 62 IN | HEART RATE: 93 BPM | BODY MASS INDEX: 34.32 KG/M2

## 2022-10-03 DIAGNOSIS — D50.8 OTHER IRON DEFICIENCY ANEMIA: ICD-10-CM

## 2022-10-03 DIAGNOSIS — K44.9 HIATAL HERNIA: ICD-10-CM

## 2022-10-03 DIAGNOSIS — Z17.1 MALIGNANT NEOPLASM OF OVERLAPPING SITES OF RIGHT BREAST IN FEMALE, ESTROGEN RECEPTOR NEGATIVE: ICD-10-CM

## 2022-10-03 DIAGNOSIS — Z17.1 MALIGNANT NEOPLASM OF OVERLAPPING SITES OF RIGHT BREAST IN FEMALE, ESTROGEN RECEPTOR NEGATIVE: Primary | ICD-10-CM

## 2022-10-03 DIAGNOSIS — C50.811 MALIGNANT NEOPLASM OF OVERLAPPING SITES OF RIGHT BREAST IN FEMALE, ESTROGEN RECEPTOR NEGATIVE: Primary | ICD-10-CM

## 2022-10-03 DIAGNOSIS — K21.00 GASTROESOPHAGEAL REFLUX DISEASE WITH ESOPHAGITIS WITHOUT HEMORRHAGE: Primary | ICD-10-CM

## 2022-10-03 DIAGNOSIS — C50.811 MALIGNANT NEOPLASM OF OVERLAPPING SITES OF RIGHT BREAST IN FEMALE, ESTROGEN RECEPTOR NEGATIVE: ICD-10-CM

## 2022-10-03 DIAGNOSIS — Z12.11 ENCOUNTER FOR SCREENING COLONOSCOPY: Primary | ICD-10-CM

## 2022-10-03 DIAGNOSIS — K63.5 POLYP OF COLON, UNSPECIFIED PART OF COLON, UNSPECIFIED TYPE: ICD-10-CM

## 2022-10-03 LAB
ALBUMIN SERPL-MCNC: 4.2 G/DL (ref 3.5–5.2)
ALBUMIN/GLOB SERPL: 1.6 G/DL
ALP SERPL-CCNC: 66 U/L (ref 39–117)
ALT SERPL W P-5'-P-CCNC: 15 U/L (ref 1–33)
ANION GAP SERPL CALCULATED.3IONS-SCNC: 5.5 MMOL/L (ref 5–15)
AST SERPL-CCNC: 24 U/L (ref 1–32)
BASOPHILS # BLD AUTO: 0.03 10*3/MM3 (ref 0–0.2)
BASOPHILS NFR BLD AUTO: 0.7 % (ref 0–1.5)
BILIRUB SERPL-MCNC: 0.4 MG/DL (ref 0–1.2)
BUN SERPL-MCNC: 19 MG/DL (ref 8–23)
BUN/CREAT SERPL: 22.6 (ref 7–25)
CALCIUM SPEC-SCNC: 9.8 MG/DL (ref 8.6–10.5)
CHLORIDE SERPL-SCNC: 104 MMOL/L (ref 98–107)
CO2 SERPL-SCNC: 31.5 MMOL/L (ref 22–29)
CREAT SERPL-MCNC: 0.84 MG/DL (ref 0.57–1)
DEPRECATED RDW RBC AUTO: 56.2 FL (ref 37–54)
EGFRCR SERPLBLD CKD-EPI 2021: 79.2 ML/MIN/1.73
EOSINOPHIL # BLD AUTO: 0.19 10*3/MM3 (ref 0–0.4)
EOSINOPHIL NFR BLD AUTO: 4.5 % (ref 0.3–6.2)
ERYTHROCYTE [DISTWIDTH] IN BLOOD BY AUTOMATED COUNT: 15.6 % (ref 12.3–15.4)
FERRITIN SERPL-MCNC: 208.7 NG/ML (ref 13–150)
GLOBULIN UR ELPH-MCNC: 2.7 GM/DL
GLUCOSE SERPL-MCNC: 127 MG/DL (ref 65–99)
HCT VFR BLD AUTO: 41.5 % (ref 34–46.6)
HGB BLD-MCNC: 13.7 G/DL (ref 12–15.9)
IMM GRANULOCYTES # BLD AUTO: 0.02 10*3/MM3 (ref 0–0.05)
IMM GRANULOCYTES NFR BLD AUTO: 0.5 % (ref 0–0.5)
IRON 24H UR-MRATE: 121 MCG/DL (ref 37–145)
IRON SATN MFR SERPL: 31 % (ref 20–50)
LYMPHOCYTES # BLD AUTO: 1.1 10*3/MM3 (ref 0.7–3.1)
LYMPHOCYTES NFR BLD AUTO: 25.9 % (ref 19.6–45.3)
MAGNESIUM SERPL-MCNC: 1.9 MG/DL (ref 1.6–2.4)
MCH RBC QN AUTO: 32.5 PG (ref 26.6–33)
MCHC RBC AUTO-ENTMCNC: 33 G/DL (ref 31.5–35.7)
MCV RBC AUTO: 98.6 FL (ref 79–97)
MONOCYTES # BLD AUTO: 0.38 10*3/MM3 (ref 0.1–0.9)
MONOCYTES NFR BLD AUTO: 8.9 % (ref 5–12)
NEUTROPHILS NFR BLD AUTO: 2.53 10*3/MM3 (ref 1.7–7)
NEUTROPHILS NFR BLD AUTO: 59.5 % (ref 42.7–76)
NRBC BLD AUTO-RTO: 0 /100 WBC (ref 0–0.2)
PHOSPHATE SERPL-MCNC: 4 MG/DL (ref 2.5–4.5)
PLATELET # BLD AUTO: 237 10*3/MM3 (ref 140–450)
PMV BLD AUTO: 10 FL (ref 6–12)
POTASSIUM SERPL-SCNC: 4.8 MMOL/L (ref 3.5–5.2)
PROT SERPL-MCNC: 6.9 G/DL (ref 6–8.5)
RBC # BLD AUTO: 4.21 10*6/MM3 (ref 3.77–5.28)
SODIUM SERPL-SCNC: 141 MMOL/L (ref 136–145)
TIBC SERPL-MCNC: 392 MCG/DL (ref 298–536)
TRANSFERRIN SERPL-MCNC: 263 MG/DL (ref 200–360)
WBC NRBC COR # BLD: 4.25 10*3/MM3 (ref 3.4–10.8)

## 2022-10-03 PROCEDURE — 99215 OFFICE O/P EST HI 40 MIN: CPT | Performed by: INTERNAL MEDICINE

## 2022-10-03 PROCEDURE — 82728 ASSAY OF FERRITIN: CPT | Performed by: INTERNAL MEDICINE

## 2022-10-03 PROCEDURE — 83735 ASSAY OF MAGNESIUM: CPT | Performed by: INTERNAL MEDICINE

## 2022-10-03 PROCEDURE — 84100 ASSAY OF PHOSPHORUS: CPT | Performed by: INTERNAL MEDICINE

## 2022-10-03 PROCEDURE — 80053 COMPREHEN METABOLIC PANEL: CPT | Performed by: INTERNAL MEDICINE

## 2022-10-03 PROCEDURE — 83540 ASSAY OF IRON: CPT | Performed by: INTERNAL MEDICINE

## 2022-10-03 PROCEDURE — 99214 OFFICE O/P EST MOD 30 MIN: CPT | Performed by: INTERNAL MEDICINE

## 2022-10-03 PROCEDURE — 84466 ASSAY OF TRANSFERRIN: CPT | Performed by: INTERNAL MEDICINE

## 2022-10-03 PROCEDURE — 85025 COMPLETE CBC W/AUTO DIFF WBC: CPT | Performed by: INTERNAL MEDICINE

## 2022-10-03 PROCEDURE — 96372 THER/PROPH/DIAG INJ SC/IM: CPT

## 2022-10-03 PROCEDURE — 36415 COLL VENOUS BLD VENIPUNCTURE: CPT

## 2022-10-03 PROCEDURE — 25010000002 DENOSUMAB 120 MG/1.7ML SOLUTION: Performed by: INTERNAL MEDICINE

## 2022-10-03 RX ORDER — SODIUM CHLORIDE 0.9 % (FLUSH) 0.9 %
3 SYRINGE (ML) INJECTION EVERY 12 HOURS SCHEDULED
Status: CANCELLED | OUTPATIENT
Start: 2022-10-03

## 2022-10-03 RX ORDER — SODIUM CHLORIDE 0.9 % (FLUSH) 0.9 %
10 SYRINGE (ML) INJECTION AS NEEDED
Status: CANCELLED | OUTPATIENT
Start: 2022-10-03

## 2022-10-03 RX ORDER — SODIUM CHLORIDE, SODIUM LACTATE, POTASSIUM CHLORIDE, CALCIUM CHLORIDE 600; 310; 30; 20 MG/100ML; MG/100ML; MG/100ML; MG/100ML
30 INJECTION, SOLUTION INTRAVENOUS CONTINUOUS PRN
Status: CANCELLED | OUTPATIENT
Start: 2022-10-03

## 2022-10-03 RX ORDER — SUCRALFATE ORAL 1 G/10ML
1 SUSPENSION ORAL 4 TIMES DAILY
Qty: 414 ML | Refills: 3 | Status: SHIPPED | OUTPATIENT
Start: 2022-10-03 | End: 2022-10-31

## 2022-10-03 RX ADMIN — DENOSUMAB 120 MG: 120 INJECTION SUBCUTANEOUS at 11:33

## 2022-10-03 NOTE — PATIENT INSTRUCTIONS
For GERD:    Follow antireflux precautions:    Avoiding eating within 3 to 4 hours of bedtime.    Avoid foods that can trigger symptoms which may include:  citrus fruits  spicy foods,  Tomatoes  Red sauces   Chocolate  coffee/tea  caffeinated or carbonated beverages  alcohol    Begin taking pantoprazole 40 mg approximately 30-60 minutes before dinner

## 2022-10-03 NOTE — PROGRESS NOTES
"Chief Complaint   Patient presents with   • Follow-up     Colon cancer screening           History of Present Illness  Patient here for follow-up.  She was hospitalized in June of this year with nausea and vomiting and diarrhea.  She was diagnosed in August with enteropathic E. coli infection.  Biopsies suggested possible celiac disease but labs were negative for celiac disease.  EGD also showed a 5 cm hiatal hernia and esophagitis.      Reports that her stools have improved in frequency and are now regular without melena or hematochezia.     She continues to pantoprazole 40 mg PO daily and life style modifications that include not eating within 4 hours prior to bed.     Denies known dietary triggers.  Denies unintentional weight loss     Family history of colon polyps in mother, denies known family history of colon cancer    Brother required colon resection secondary to diverticulitis     Result Review :       UPPER GI ENDOSCOPY (05/31/2022 13:25)  SCANNED - COLONOSCOPY (02/07/2020)  Tissue Pathology Exam (05/31/2022 13:33)  Celiac Disease Panel (08/08/2022 09:15)      Vital Signs:   /100   Pulse 93   Temp 97.9 °F (36.6 °C)   Ht 156.8 cm (61.75\")   Wt 84.6 kg (186 lb 8 oz)   SpO2 98%   BMI 34.39 kg/m²     Body mass index is 34.39 kg/m².     Physical Exam  Vitals reviewed.   Constitutional:       Appearance: Normal appearance.   Abdominal:      General: Bowel sounds are normal. There is no distension.      Palpations: Abdomen is soft. Abdomen is not rigid. There is no mass or pulsatile mass.      Tenderness: There is no abdominal tenderness. There is no guarding or rebound.           Assessment and Plan    Diagnoses and all orders for this visit:    1. Gastroesophageal reflux disease with esophagitis without hemorrhage (Primary)    2. Hiatal hernia         BRIEF SUMMARY  Patient here for follow-up.  She states that the Protonix is helping her reflux a lot she has some episodes of occasional breakthrough " at night.  We advised her to take her Protonix before her evening meal.  We will go ahead and schedule her screening colonoscopy and also do biopsies to evaluate her diarrhea.    I have reviewed and confirmed the accuracy of the HPI and Assessment and Plan as documented by the LISA Oconnor        Follow Up   No follow-ups on file.    Patient Instructions   For GERD:    Follow antireflux precautions:    1. Avoiding eating within 3 to 4 hours of bedtime.    2. Avoid foods that can trigger symptoms which may include:  a. citrus fruits  b. spicy foods,  c. Tomatoes  d. Red sauces  e.  Chocolate  f. coffee/tea  g. caffeinated or carbonated beverages  h. alcohol    Begin taking pantoprazole 40 mg approximately 30-60 minutes before dinner

## 2022-10-05 ENCOUNTER — TELEPHONE (OUTPATIENT)
Dept: INTERNAL MEDICINE | Facility: CLINIC | Age: 62
End: 2022-10-05

## 2022-10-05 NOTE — TELEPHONE ENCOUNTER
Patient called in about leg braces. She advised me they're broken and is needing a referral to Dr. Bermudez's office to get them fixed.    Please put in referral and fax to:  [phone]

## 2022-10-07 DIAGNOSIS — G60.0 HEREDITARY SENSORIMOTOR NEUROPATHY: Primary | ICD-10-CM

## 2022-10-07 DIAGNOSIS — G60.0 CHARCOT-MARIE-TOOTH DISEASE: ICD-10-CM

## 2022-10-07 DIAGNOSIS — R26.9 GAIT DISORDER: ICD-10-CM

## 2022-10-10 ENCOUNTER — OFFICE VISIT (OUTPATIENT)
Dept: ONCOLOGY | Facility: CLINIC | Age: 62
End: 2022-10-10

## 2022-10-10 ENCOUNTER — LAB (OUTPATIENT)
Dept: OTHER | Facility: HOSPITAL | Age: 62
End: 2022-10-10

## 2022-10-10 VITALS
WEIGHT: 187.5 LBS | RESPIRATION RATE: 18 BRPM | OXYGEN SATURATION: 98 % | SYSTOLIC BLOOD PRESSURE: 157 MMHG | TEMPERATURE: 98.4 F | DIASTOLIC BLOOD PRESSURE: 83 MMHG | HEIGHT: 62 IN | BODY MASS INDEX: 34.5 KG/M2 | HEART RATE: 94 BPM

## 2022-10-10 DIAGNOSIS — C50.919 METASTATIC BREAST CANCER: ICD-10-CM

## 2022-10-10 DIAGNOSIS — C50.811 MALIGNANT NEOPLASM OF OVERLAPPING SITES OF RIGHT BREAST IN FEMALE, ESTROGEN RECEPTOR NEGATIVE: Primary | ICD-10-CM

## 2022-10-10 DIAGNOSIS — Z17.1 MALIGNANT NEOPLASM OF OVERLAPPING SITES OF RIGHT BREAST IN FEMALE, ESTROGEN RECEPTOR NEGATIVE: Primary | ICD-10-CM

## 2022-10-10 DIAGNOSIS — Z79.899 HIGH RISK MEDICATION USE: ICD-10-CM

## 2022-10-10 DIAGNOSIS — L27.1 PALMAR PLANTAR ERYTHRODYSAESTHESIA DUE TO CYTOTOXIC THERAPY: ICD-10-CM

## 2022-10-10 DIAGNOSIS — Z17.1 MALIGNANT NEOPLASM OF OVERLAPPING SITES OF RIGHT BREAST IN FEMALE, ESTROGEN RECEPTOR NEGATIVE: ICD-10-CM

## 2022-10-10 DIAGNOSIS — C50.811 MALIGNANT NEOPLASM OF OVERLAPPING SITES OF RIGHT BREAST IN FEMALE, ESTROGEN RECEPTOR NEGATIVE: ICD-10-CM

## 2022-10-10 LAB
ALBUMIN SERPL-MCNC: 4.4 G/DL (ref 3.5–5.2)
ALBUMIN/GLOB SERPL: 1.6 G/DL
ALP SERPL-CCNC: 75 U/L (ref 39–117)
ALT SERPL W P-5'-P-CCNC: 16 U/L (ref 1–33)
ANION GAP SERPL CALCULATED.3IONS-SCNC: 7.7 MMOL/L (ref 5–15)
AST SERPL-CCNC: 25 U/L (ref 1–32)
BASOPHILS # BLD AUTO: 0.05 10*3/MM3 (ref 0–0.2)
BASOPHILS NFR BLD AUTO: 0.7 % (ref 0–1.5)
BILIRUB SERPL-MCNC: 0.4 MG/DL (ref 0–1.2)
BUN SERPL-MCNC: 17 MG/DL (ref 8–23)
BUN/CREAT SERPL: 17.7 (ref 7–25)
CALCIUM SPEC-SCNC: 9.4 MG/DL (ref 8.6–10.5)
CHLORIDE SERPL-SCNC: 103 MMOL/L (ref 98–107)
CO2 SERPL-SCNC: 30.3 MMOL/L (ref 22–29)
CREAT SERPL-MCNC: 0.96 MG/DL (ref 0.57–1)
DEPRECATED RDW RBC AUTO: 55 FL (ref 37–54)
EGFRCR SERPLBLD CKD-EPI 2021: 67.5 ML/MIN/1.73
EOSINOPHIL # BLD AUTO: 0.32 10*3/MM3 (ref 0–0.4)
EOSINOPHIL NFR BLD AUTO: 4.6 % (ref 0.3–6.2)
ERYTHROCYTE [DISTWIDTH] IN BLOOD BY AUTOMATED COUNT: 14.6 % (ref 12.3–15.4)
GLOBULIN UR ELPH-MCNC: 2.8 GM/DL
GLUCOSE SERPL-MCNC: 134 MG/DL (ref 65–99)
HCT VFR BLD AUTO: 40.9 % (ref 34–46.6)
HGB BLD-MCNC: 13 G/DL (ref 12–15.9)
IMM GRANULOCYTES # BLD AUTO: 0.02 10*3/MM3 (ref 0–0.05)
IMM GRANULOCYTES NFR BLD AUTO: 0.3 % (ref 0–0.5)
LYMPHOCYTES # BLD AUTO: 0.77 10*3/MM3 (ref 0.7–3.1)
LYMPHOCYTES NFR BLD AUTO: 11 % (ref 19.6–45.3)
MCH RBC QN AUTO: 32.5 PG (ref 26.6–33)
MCHC RBC AUTO-ENTMCNC: 31.8 G/DL (ref 31.5–35.7)
MCV RBC AUTO: 102.3 FL (ref 79–97)
MONOCYTES # BLD AUTO: 0.59 10*3/MM3 (ref 0.1–0.9)
MONOCYTES NFR BLD AUTO: 8.4 % (ref 5–12)
NEUTROPHILS NFR BLD AUTO: 5.26 10*3/MM3 (ref 1.7–7)
NEUTROPHILS NFR BLD AUTO: 75 % (ref 42.7–76)
NRBC BLD AUTO-RTO: 0 /100 WBC (ref 0–0.2)
PLATELET # BLD AUTO: 190 10*3/MM3 (ref 140–450)
PMV BLD AUTO: 10.5 FL (ref 6–12)
POTASSIUM SERPL-SCNC: 4.8 MMOL/L (ref 3.5–5.2)
PROT SERPL-MCNC: 7.2 G/DL (ref 6–8.5)
RBC # BLD AUTO: 4 10*6/MM3 (ref 3.77–5.28)
SODIUM SERPL-SCNC: 141 MMOL/L (ref 136–145)
WBC NRBC COR # BLD: 7.01 10*3/MM3 (ref 3.4–10.8)

## 2022-10-10 PROCEDURE — 36415 COLL VENOUS BLD VENIPUNCTURE: CPT

## 2022-10-10 PROCEDURE — 80053 COMPREHEN METABOLIC PANEL: CPT | Performed by: INTERNAL MEDICINE

## 2022-10-10 PROCEDURE — 85025 COMPLETE CBC W/AUTO DIFF WBC: CPT | Performed by: INTERNAL MEDICINE

## 2022-10-10 PROCEDURE — 99214 OFFICE O/P EST MOD 30 MIN: CPT | Performed by: NURSE PRACTITIONER

## 2022-10-10 RX ORDER — GABAPENTIN 300 MG/1
CAPSULE ORAL
Qty: 180 CAPSULE | Refills: 3 | Status: SHIPPED | OUTPATIENT
Start: 2022-10-10 | End: 2023-03-28

## 2022-10-10 NOTE — PROGRESS NOTES
"Chief Complaint  Previous Stage Ib ([id number]) ER/OH positive, HER-2/peter negative right breast cancer with subsequent metastatic disease identified 10/8/2017, history of right pulmonary embolism, cancer related pain, chemotherapy-induced diarrhea, chemotherapy-induced mucositis, chemotherapy-induced hand-foot syndrome    Subjective        History of Present Illness  The patient returns today in short-term follow-up for consideration of restarting Xeloda.  She was seen last week and was having worsening difficulty with sclerodactyly and we elected to have her delay initiation of next cycle.    As she is reviewed back today she still has quite a bit of redness of her hands and some peeling of the fingertips.  After some discussion we agreed to have her wait 1 additional week and then plan to restart 10/17/2022 at which point I do expect her hands to be even further improved.  She is already scheduled for follow-up CT scans in 2 weeks and we will leave this as is.    She denies other concerns at this time.    Objective   Vital Signs:   /83   Pulse 94   Temp 98.4 °F (36.9 °C) (Temporal)   Resp 18   Ht 156.8 cm (61.73\")   Wt 85 kg (187 lb 8 oz)   SpO2 98%   BMI 34.59 kg/m²     Physical Exam  Constitutional:       Appearance: She is well-developed.      Comments: Patient ambulates with the use of a cane   Eyes:      Conjunctiva/sclera: Conjunctivae normal.   Neck:      Thyroid: No thyromegaly.   Cardiovascular:      Rate and Rhythm: Normal rate and regular rhythm.      Heart sounds: No murmur heard.    No friction rub. No gallop.   Pulmonary:      Effort: No respiratory distress.      Breath sounds: Normal breath sounds.   Abdominal:      General: Bowel sounds are normal. There is no distension.      Palpations: Abdomen is soft.      Tenderness: There is no abdominal tenderness.   Musculoskeletal:      Comments: Braces in place in the bilateral lower extremities   Lymphadenopathy:      Head:      Right side " of head: No submandibular adenopathy.      Cervical: No cervical adenopathy.      Upper Body:      Right upper body: No supraclavicular adenopathy.      Left upper body: No supraclavicular adenopathy.   Skin:     General: Skin is warm and dry.      Findings: Rash present.      Comments: Erythema involving the bilateral palms and extending into the dorsum of the hand has improved.  There is skin peeling noted.  The degree of sclerodactyly has slightly improved.   Neurological:      Mental Status: She is alert and oriented to person, place, and time.      Cranial Nerves: No cranial nerve deficit.      Motor: No abnormal muscle tone.      Deep Tendon Reflexes: Reflexes normal.   Psychiatric:         Behavior: Behavior normal.         Result Review :  Results from last 7 days   Lab Units 10/10/22  1017   WBC 10*3/mm3 7.01   NEUTROS ABS 10*3/mm3 5.26   HEMOGLOBIN g/dL 13.0   HEMATOCRIT % 40.9   PLATELETS 10*3/mm3 190     Results from last 7 days   Lab Units 10/10/22  1017   SODIUM mmol/L 141   POTASSIUM mmol/L 4.8   CHLORIDE mmol/L 103   CO2 mmol/L 30.3*   BUN mg/dL 17   CREATININE mg/dL 0.96   CALCIUM mg/dL 9.4   ALBUMIN g/dL 4.40   BILIRUBIN mg/dL 0.4   ALK PHOS U/L 75   ALT (SGPT) U/L 16   AST (SGOT) U/L 25   GLUCOSE mg/dL 134*                  Assessment and Plan     1. Previous Stage Ib ([id number]) right breast cancer:  · Diagnosed May 2010 with excisional biopsy for invasive ductal carcinoma, 1.3 cm, grade 2, ER 90%, OR 80%, HER-2/peter negative (1+ IHC).    · Subsequent right mastectomy in July 2010 with no residual breast malignancy, 1/5 sentinel lymph node with micrometastasis (0.25 mm).    · Treated in the [facility] system with adjuvant AC ×4 cycles in 2010 (no taxanes administered due to underlying Charcot-Saloni-Tooth with peripheral neuropathy).    · Adjuvant Femara (postmenopausal) initiated October 2010 with plan to treat ×10 years.    · Genetic testing reportedly negative.    · Developed osteopenia treated  with Prolia beginning 2/27/13. Subsequently discontinued due to identification of metastatic disease.  2. Recurrent/metastatic disease identified 10/8/17:  · Disease involving thoracic spine with cord compression at T6, lumbosacral involvement, sternal and right sternoclavicular involvement.    · Femara discontinued in 10/2017.    · Radiation administered (in the [facility] system) to the thoracic spine beginning 10/19/17 treating T3-T9 to a dose of 24 gray in 6 fractions.  · Evaluation with MRI 12/8/17 showing persistent T6 cord compression with persistent neurologic compromise requiring surgical treatment 12/11/17 with T6 laminectomy/corpectomy and T3-T9 fusion.  Pathology with metastatic carcinoma of breast origin, ER negative, MS negative, HER-2/peter negative (1+ IHC).  · Additional staging evaluation 12/8/17 with no evidence of visceral metastatic disease, bone scan showing involvement of thoracic spine, sternum, left humerus, mid frontal bone.  No plane film correlate of left humerus lesion.  MRI lumbar spine with small intradural L3 metastasis.  CA 15-3 12/6/17- 17.  · Palliative radiation therapy to L3 dural metastasis and left humerus initiated 1/15/18 (10 fractions), completed 1/26/18.  · Hypercalcemia of malignancy with calcium in the 10-11 range.  · Initiation of monthly Xgeva 1/23/18.  · Baseline CT scan 1/30/18 with no evidence of visceral involvement.  Cluster of nodular opacities in the right lower lobe suspected to be infectious or related to bronchiolitis. Bone scan 1/30/18 showed postsurgical change in the thoracic spine, stable uptake in the frontal bone, no new areas of disease.  · Initiation of palliative oral single agent Xeloda 2/7/18 2000 mg a.m., 1500 mg p.m. for 14/21 days.   · Following 3 cycles xeloda, bone scan 4/4/18 showed no change from the prior study.  CT scan 4/4/18 showed a small pericardial effusion of unclear significance as well as a subcutaneous nodule in the right lateral chest  wall.  Subsequent evaluation with echocardiogram 4/17/18 showed no evidence of pericardial effusion.  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  We discussed the possibility of surgical excision of the right subcutaneous chest wall lesion for more definitive diagnosis.  Reviewed previous CT images dating back to 12/8/17 and the lesion was present even at that time measuring around 1.7 cm although not commented on in the radiology report.  As this appears to be an indolent low-grade process unrelated to her breast cancer, recommendee foregoing surgical excision at this time and monitoring this area on future scans.  The patient agreed.    · Following 6 cycles of Xeloda, CT 6/6/18  showed stable findings, no evidence of progressive disease.  There was a comment regarding subcutaneous abnormality in the anterior abdominal wall and this was related to Lovenox injection sites.  Bone scan 6/6/18 showed no interval change.   · CT scan and bone scan 8/13/18 following 9 cycles of Xeloda showed stable findings with no evidence of significant visceral metastases.  Her bone lesions appear stable on bone scan.  The spindle cell neoplasm in her right chest wall actually decreased in size from 2 cm down to 1.6 cm.    · The patient experienced some symptoms of diarrhea, anorexia, generalized weakness during cycle 9 Xeloda it was unclear whether this was related to a viral gastroenteritis or toxicity from treatment.  Symptoms recurred during cycle 10 and treatment was cut short by 2 days.  Symptoms attributed to Xeloda.  With cycle 11, dose and schedule altered to 1500 mg twice daily for 7 days on followed by 7 days off .  · CT scan 9/9/2020 with no significant changes.  Bone scan 9/9/2020 read as unchanged from prior studies however did note an area of slight activity in the medial left  femur.  Contacted radiology and although this was not noted on prior reports appears to have been present.  Subsequent MRI left femur 9/21/2020 with cortical thickening and periosteal edema left iliac us muscle insertion to the medial left femur with no evidence of metastatic disease, favored to represent periosteal change secondary to insertional tendinitis.  · Severe hand-foot symptoms causing sclerodactyly and limitation in finger movement prompted change in dosing in July 2021 with Xeloda decreased to 1000 mg a.m., 1500 mg p.m. for 7 days on followed by 7 days off.  · Patient was experiencing severe hand-foot syndrome related to Xeloda having developed skin peeling, sclerodactyly with limited use of her hands.  On 3/31/2022, Xeloda was held to allow for recovery.  Patient resumed Xeloda on 4/18/2022.  · Patient required hospitalization 5/29 - 6/1/2022 with presumed viral gastroenteritis that was exacerbated by Xeloda.  Xeloda held briefly, resumed on 6/6/2022.  · Most recent interval scans from 8/2/2022 with CT chest abdomen pelvis that showed stable findings including left supraclavicular lymph node, right lower lobe pulmonary nodules, bony lesions.  Bone scan with stable findings.  · 10/3/2022 patient seen in follow-up due for next cycle of Xeloda 1000 mg a.m., 1500 mg p.m. 7 days on followed by 7 days off (due to resume Xeloda today).  Patient also due for monthly Xgeva.  Due to worsening sclerodactyly patient instructed to hold off on reinitiation of Xeloda and review back in 1 week.   · 10/10/2022 patient seen back today with some improvement in sclerodactyly though she is having significant peeling of the tips of her fingers and still has quite a bit of erythroderma.  After discussion, agreed patient will take an additional 1 week off of therapy and restart Xeloda as of 10/17/2022, taking 1000 mg in the a.m., 1500 mg in the p.m. for 7 days on followed by 7 days off.  She will maintain schedule of repeat CT  scans and bone scan in 2 weeks and she will see Dr. Hancock back thereafter in 3 weeks for scan review.   3. Right pulmonary embolism:  · Diagnosed on CT angiogram 10/21/17 involving small right lower lobe pulmonary artery.  Lower extremity Dopplers negative.  · Bilateral lower extremity Dopplers negative again 12/5/17.  · Received chronic Lovenox 1 mg/kg twice per day, transition to oral Eliquis in February 2019, continuing on Eliquis 5 mg twice daily.  · Patient continues on anticoagulation without any complications.  4. Cancer related pain:  · Previously receiving Duragesic 50 µg patch every 72 hours along with Dilaudid 4 mg as needed for breakthrough pain  · The patient's pain improved over time and she was able to discontinue both Duragesic and Dilaudid in the interval.  · Patient does take occasional Flexeril at bedtime due to back spasm/pain when she has been more active.  · The patient does have some occasional aggravation of her chronic back pain and does use tramadol 50 mg every 8 hours as needed  · Patient's pain is stable.  She does continue to use tramadol 50 mg every 8-12 hours as needed which has been effective.  The patient does have chronic difficulty with pain involving her low back, left shoulder.  Patient notes that her pain does wax and wane.  Physical therapy did help.  5. Chemotherapy-induced diarrhea with subsequent C. difficile colitis in the setting of previous ulcerative colitis and then identification of enteropathogenic E. coli:  · Patient with reported history of ulcerative colitis, continues on mesalamine.  · The patient developed initial diarrhea related to Xeloda at regular dosing.  · Symptoms improved on reduced dose Xeloda  · Flare of symptoms in October 2018 with apparent finding of C. difficile colitis by GI, treated with course of oral vancomycin with improvement in symptoms.  · Patient notes minimal intermittent diarrhea/loose stools on Xeloda requiring occasional dosing of  Imodium.    · Patient required hospitalization 5/29 - 6/1/2022 with presumed viral gastroenteritis that was exacerbated by Xeloda.  Xeloda held briefly, resumed on 6/6/2022.  · Patient's  developed C. difficile colitis and patient was experiencing increase in diarrhea.  Stool studies performed 8/4/22 negative C. difficile however GI PCR was positive for enteropathogenic E. coli.  Given patient's symptoms and immunocompromise status it was elected to treat her with azithromycin 500 mg daily x3 days beginning 8/5/2022  · Patient reports that diarrhea resolved.  6. Traumatic left tibia/fibular fracture:  · Status post ORIF 12/6/17  · Specimen was sent for pathologic review, negative for evidence of malignancy  7. Hypercalcemia:  · Suspect hypercalcemia of malignancy, calcium in  10-11 range previously.  · Calcium normalized following initiation of monthly Xgeva on 1/23/18.  8. Chemotherapy-induced mucositis:  · Patient had a minimal degree of mucositis with cycle 2.  The patient has magic mouthwash to use as needed.  No subsequent mucositis.  9. Recurrent UTI, bladder wall thickening on CT:  · Patient had an enterococcal UTI on 3/2/18 sensitive to nitrofurantoin and received treatment, unclear how long.  · Recurrent UTI 3/20/18 with urine culture growing Klebsiella, initially treated with nitrofurantoin, transitioned to Levaquin.  · CT 4/4/18 with diffuse bladder wall thickening with increased nodular thickening at the left base.  Referral to urogynecology Dr. May Johnson.  She was placed on a prophylactic dose of trimethoprim 100 mg daily, bladder wall thickening felt to be related to recent recurrent urinary tract infections.  · Patient with urinary symptoms, treated with course of Macrobid at the end of December 2018, urine culture however was negative 12/31/18.  · Patient was found to have Klebsiella UTI 7/29/2019 which was successfully treated with Macrobid with complete resolution of symptoms.  · CT  8/10/2021 with diffuse bladder wall thickening new from 5/17/2021 (however seen on multiple prior scans).    · UTI on 10/18/2021 with E. coli greater than 100,000 colonies that was pansensitive, treated with Macrobid x7 days  · With ongoing/recurrent UTIs patient was seen by urogynecology and initiated suppressive therapy with trimethoprim 100 mg daily in December 2021.  She has not experienced any further urinary tract infections.  · CT abdomen pelvis 3/28/2022 does show diffuse thickening of urinary bladder as has been seen on prior studies indicative of cystitis.  · Patient notes that she did stop taking trimethoprim on a daily basis.  She did follow-up recently with urogynecology, aware that she is off of suppressive antibiotics.  Fortunately however she has not experienced any recurrent UTIs recently.  10.   Mobility:  · The patient underwent an intensive course of rehabilitation at [facility].  She graduated from her outpatient course November 2018.  · Overall the patient has improved dramatically in terms of mobility,   · Patient has been continuing to walk for exercise on a regular basis.  11.  Depression:  · The patient is continuing follow-up in the supportive oncology clinic and is currently continuing on Cymbalta 30 mg twice daily as well as gabapentin 300 mg at dinner and 300 mg nightly, temazepam 15 mg nightly as needed, Provigil 100 mg daily.  · Patient does continue to attend support groups at [facility].  · The patient does continue routine follow-up in supportive oncology clinic.    · Patient does have multiple stressors with her 's malignancy and chemotherapy as well as her autistic son who is living with them at home.  · Patient last seen by supportive oncology 8/5/2022  · Patient today is somewhat tearful in regards to her 's medical situation.  12. Hand-foot syndrome secondary to Xeloda:  · Patient continues with frequent application of emollient cream to the hands and  feet  · Symptoms increased significantly requiring a 1 week delay in cycle 18 Xeloda as noted above.  Symptoms did improve and she continued on the same dose.    · Patient was referred to dermatology and has been continuing on triamcinolone 0.1% cream used for 1 week on followed by 1 week off which led to some further improvement.   · Progressive palmar erythema with development of sclerodactyly and effect on dexterity.  Addition of urea-based cream 3 times daily.  · Patient with continued difficulty regarding erythema of her hands that extended onto the dorsal aspect and was causing contractures/sclerodactyly in her fingers affecting her dexterity.  In July 2021, held Xeloda for an additional week and then reduced dose from 1500 mg twice daily down to 1000 mg a.m. and 1500 mg p.m. and continued on a 7-day on followed by 7-day off schedule.  · With reduction in Xeloda dose, slight improvement in erythema involving dorsal aspect of the hands and slight decrease in sclerodactyly  · Patient was experiencing severe hand-foot syndrome related to Xeloda having developed skin peeling, sclerodactyly with limited use of her hands.  On 3/31/2022, Xeloda was held to allow for recovery.  Patient resumed Xeloda on 4/18/2022.  · Per above, Xeloda delayed last week due to worsening sclerodactyly.  This is slowly improving though due to continued erythroderma and more skin peeling, we will have the patient delay an additional 1 week with plans to restart 10/17/2022.  She will continue use of emollient cream frequently (currently 5 times daily) and topical triamcinolone 0.1% twice daily intermittently (2 weeks on followed by 2 weeks off as instructed by dermatology).    13. Evidence of steatosis on scans with mild intermittent elevated liver function studies:  · Liver function studies increased 8/20/19 with ALT 98, AST 70, normal total bilirubin.  · Negative viral hepatitis A, B, and C panel 8/23/18  · Likely related to hepatic  steatosis.   · Subsequent improvement in LFTs  · LFTs today are normal  14. Chemotherapy induced leukopenia:  · WBC today  is stable at 4.25, ANC 2.53.  15. GERD, question of celiac disease:  · Patient with significant history of reflux  · Patient currently receiving Protonix 40 mg daily daily and Carafate 1 g 4 times daily as needed  · Patient required hospitalization 5/29 - 6/1/2022 with presumed viral gastroenteritis that was exacerbated by Xeloda.    · EGD performed 5/31/2022 during hospitalization with biopsy that showed focal blunting of villi and atrophy with slight increase in intraepithelial lymphocytes.  Changes were minimal but recommended correlation with serology to exclude celiac disease.  · Celiac serology 8/8/2022 negative  · Patient with recent increase in reflux symptoms.  We asked her to increase Protonix to 40 mg twice daily and also resume Carafate 1 g 4 times a day.  She was seen in follow-up by GI as well on 10/3/2022 who instructed her to avoid foods that could trigger reflux and avoid eating 3 to 4 hours before bedtime.  She was also educated to take the pantoprazole 30 to 60 minutes prior to dinner.  16. Insomnia:  · Patient with prior paradoxical reaction to Benadryl  · Improved previously on temazepam 15 mg nightly as needed.   · Patient noted subsequently that temazepam was having no effect.   · Symptoms currently stable on gabapentin 300 mg at dinner and 300 mg nightly as well as temazepam 15 mg nightly as needed  17. Health maintenance:  · Patient notes that she has a history of colon polyps as well as ulcerative colitis and was due for a follow-up colonoscopy on 9/12/2019.  We did discuss there is no necessity to pursue colonoscopy in the setting of her metastatic breast cancer.    · The patient did undergo colonoscopy on 2/7/2020 with findings of muscular hypertrophy and diverticulosis.   · See above discussion, note patient plans to proceed with colonoscopy in the future, being  rescheduled  18. Right shoulder pain:  · Patient was evaluated by Dr Forrester.  She has experienced difficulty over a long time frame with right shoulder although she has not complained of this in prior visits to our office.  She reported difficulty with abduction.    · The patient did undergo MRI of her right shoulder on 11/21/2019 at an outside facility showing multiple abnormalities including supraspinatus tendinosis, labral tear but no evidence of metastatic disease.  · Patient developed pain in the left shoulder, was seen by orthopedics and underwent steroid injection with some improvement.  Right shoulder is stable currently.  Patient did undergo physical therapy with some improvement.  She continues to use tramadol 50 mg every 8-12 hours as needed.  19. Ocular changes in part related to Xeloda:  · Patient experienced a mild degree of blurred vision as well as burning and pruritus.  · She was seen by her ophthalmologist and was placed on xiidra ophthalmic drops which have helped.  · Likely both issues are to some extent related to Xeloda.  · Symptoms did worsen and patient was seen by a new ophthalmologist at [facility].  She is now using an ocular lubricant at night in addition to artificial tears which have helped.  20. Elevated glucose:  · It is noted the patient's glucose at the last few visits has been in the high 100 range, postprandial.  · She has had a hemoglobin A1c that has been in the high 5-6 range in the past, on 5/1/2019 at 5.7  · Hemoglobin A1c was last checked in 11/12/2021 at 5.8  21. Possible loosening of pedicle screw at T3:  · CT cervical spine 5/17/2021 showed loosening of the left pedicle screw at T3.  Patient referred to orthopedics and was seen by Dr. Nayak who felt that there were no concerning findings on the scan  22. COVID-19 vaccination  · Patient received the Pfizer vaccine, second dose administered on 4/2/2021 without side effects.  · Patient received her third  dose Pfizer COVID-19 injection in August 2021  23. Iron deficiency anemia  · Labs on 5/2/2022 with hemoglobin 10.8.  Additional labs with iron 48, ferritin 21.2, iron saturation 11%, TIBC 4 and 32, folate greater than 20, B12 greater than 2000.    · Attempted treatment with oral iron daily however patient was intolerant  · Patient subsequently received Venofer 300 mg weekly x4 beginning 6/6/2022 and completed on 6/27/2022  · Subsequent iron studies on 7/11/2022 showed improvement with iron 62, ferritin 345, iron saturation 18%, TIBC 336.  Hemoglobin had improved up to 12.7 at that time.  · Repeat iron studies 10/3/2022 showed iron replete status with hemoglobin 13.7, iron 121, ferritin 208.7, iron saturation 31%, TIBC 392.     Plan:  1. Hold Xeloda an additional 1 week.  Patient continues with degree of sclerodactyly though it is improving per above.   2. As of 10/17/2022 patient will resume Xeloda at previous dosing of 1000 mg a.m., 1500 mg in the p.m. 7 days on followed by 7 days off.  3. Continue Eliquis 5 mg twice daily  4. The patient will continue frequent use of emollient cream currently 5 times daily and add continue periodic triamcinolone cream 0.1% twice daily (provided by dermatology) 2 weeks on followed by 2 weeks off as prescribed (asked patient to resume this today)  5. Continue Protonix 40 mg twice daily dosing temporarily with recent flare in reflux symptoms  6. Continue Carafate suspension 1 g 4 times daily  7. Continue ocular lubricating gel nightly per ophthalmology  8. Continue Provigil 100 mg daily and Cymbalta 30 mg twice daily, gabapentin 300 mg at dinner and 300 mg at night per supportive oncology clinic.  Patient continues routine follow-up with supportive oncology (last seen on 8/5/2022).  9. Continue temazepam 15 mg nightly.  10. In 2 weeks CT chest abdomen pelvis and bone scan  11. In 3 weeks MD visit with CBC, CMP, magnesium, phosphorus and Xgeva     Patient continuing on high risk  medication requiring intensive monitoring.

## 2022-10-14 ENCOUNTER — DOCUMENTATION (OUTPATIENT)
Dept: PHARMACY | Facility: HOSPITAL | Age: 62
End: 2022-10-14

## 2022-10-14 NOTE — PROGRESS NOTES
Pt has been holding and still has a full bottle of 70 pills. Going to push out refill coordination out by 1 month.

## 2022-10-19 ENCOUNTER — TELEMEDICINE (OUTPATIENT)
Dept: PSYCHIATRY | Facility: CLINIC | Age: 62
End: 2022-10-19

## 2022-10-19 DIAGNOSIS — F41.1 GENERALIZED ANXIETY DISORDER: Primary | ICD-10-CM

## 2022-10-19 PROCEDURE — 90834 PSYTX W PT 45 MINUTES: CPT | Performed by: COUNSELOR

## 2022-10-19 NOTE — PROGRESS NOTES
Date: October 19, 2022  Time In: 1400  Time Out:1441  This provider is located at the [facility]through [facility] [place] [facility]), [address], [place] [address] using a secure [employer] Video Visit through [employer]. Patient is being seen remotely via telehealth at home address in [place] and stated they are in a secure environment for this session. The patient's condition being diagnosed/treated is appropriate for telemedicine. The provider identified herself as well as her credentials. The patient, and/or patients guardian, consent to be seen remotely, and when consent is given they understand that the consent allows for patient identifiable information to be sent to a third party as needed. They may refuse to be seen remotely at any time. The electronic data is encrypted and password protected, and the patient and/or guardian has been advised of the potential risks to privacy not withstanding such measures.     You have chosen to receive care through a telehealth visit.  Do you consent to use a video/audio connection for your medical care today? Yes    PROGRESS NOTE  Data:  Martha Davey is a 61 y.o. female who presents today for follow up    Chief Complaint: anxiety     History of Present Illness: Pt discussed interpersonal relationships as well as 's declining health and upcoming pet scan. Pt reports that she tries to discuss her thoughts and emotions with sons but seems to get dismissed in some regard. Pt reports friendships and how one friend seems to have this cycle where she is really involved with Pt and than will isolate self and become involved with other friends. Pt reports that she doesn't understand why they couldn't all become a friend group and get along. Pt discussed worry associated with these relationships and inquires if she needs to change her behavior and/or perspective.       Clinical Maneuvering/Intervention:    (Scales based on 0 - 10 with 10 being  the worst)  Depression: 2 Anxiety: 3       Assisted patient in processing above session content; acknowledged and normalized patient’s thoughts, feelings, and concerns.  Rationalized patient thought process regarding relationships.  Discussed triggers associated with patient's anxiety.  Also discussed coping skills for patient to implement such as setting boundaries with friend and provided education regarding boundaries as well as offering alternative perspective to sons' behaviors.    Allowed patient to freely discuss issues without interruption or judgment. Provided safe, confidential environment to facilitate the development of positive therapeutic relationship and encourage open, honest communication. Assisted patient in identifying risk factors which would indicate the need for higher level of care including thoughts to harm self or others and/or self-harming behavior and encouraged patient to contact this office, call 911, or present to the nearest emergency room should any of these events occur. Discussed crisis intervention services and means to access. Patient adamantly and convincingly denies current suicidal or homicidal ideation or perceptual disturbance.    Assessment:   Assessment   Patient appears to maintain relative stability as compared to their baseline.  However, patient continues to struggle with anxiety which continues to cause impairment in important areas of functioning.  A result, they can be reasonably expected to continue to benefit from treatment and would likely be at increased risk for decompensation otherwise.    Mental Status Exam:   Hygiene:   good  Cooperation:  Cooperative  Eye Contact:  Good  Psychomotor Behavior:  Appropriate  Affect:  Appropriate  Mood: normal  Speech:  Normal  Thought Process:  Linear  Thought Content:  Normal  Suicidal:  None  Homicidal:  None  Hallucinations:  None  Delusion:  None  Memory:  Intact  Orientation:  Person, Place, Time and  Situation  Reliability:  fair  Insight:  Fair  Judgement:  Fair  Impulse Control:  Fair  Physical/Medical Issues:  No        Patient's Support Network Includes:      Functional Status: Mild impairment     Progress toward goal: Not at goal    Prognosis: Fair with Ongoing Treatment          Plan:    Patient will continue in individual outpatient therapy with focus on improved functioning and coping skills, maintaining stability, and avoiding decompensation and the need for higher level of care.    Patient will adhere to medication regimen as prescribed and report any side effects. Patient will contact this office, call 911 or present to the nearest emergency room should suicidal or homicidal ideations occur. Provide Cognitive Behavioral Therapy and Solution Focused Therapy to improve functioning, maintain stability, and avoid decompensation and the need for higher level of care.     Return in about 3 weeks, or earlier if symptoms worsen or fail to improve.           VISIT DIAGNOSIS:     ICD-10-CM ICD-9-CM   1. Generalized anxiety disorder  F41.1 300.02        Diagnoses and all orders for this visit:    1. Generalized anxiety disorder (Primary)           [facility] No Show Policy:  We understand unexpected circumstances arise; however, anytime you miss your appointment we are unable to provide you appropriate care.  In addition, each appointment missed could have been used to provide care for others.  We ask that you call at least 24 hours in advance to cancel or reschedule an appointment.  We would like to take this opportunity to remind you of our policy stating patients who miss THREE or more appointments without cancelling or rescheduling 24 hours in advance of the appointment may be subject to cancellation of any further visits with our clinic and recommendation to seek in-person services/visits.    Please call [phone] to reschedule your appointment. If there are reasons that  make it difficult for you to keep the appointments, please call and let us know how we can help.  Please understand that medication prescribing will not continue without seeing your provider.      [facility]'s No Show Policy reviewed with patient at today's visit. Patient verbalized understanding of this policy. Discussed with patient that in the event that there are three or more no show visits, it will be recommended that they pursue in-person services/visits as noncompliance with telehealth visits indicates that patient is not an appropriate candidate for telemedicine and would likely be more appropriate for in-person services/visits. Patient verbalizes understanding and is agreeable to this.        This document has been electronically signed by Sophia Barron LCSW  October 19, 2022 14:53 EDT      Part of this note may be an electronic transcription/translation of spoken language to printed text using the Dragon Dictation System.

## 2022-10-24 ENCOUNTER — HOSPITAL ENCOUNTER (OUTPATIENT)
Dept: NUCLEAR MEDICINE | Facility: HOSPITAL | Age: 62
Discharge: HOME | End: 2022-10-24

## 2022-10-24 ENCOUNTER — HOSPITAL ENCOUNTER (OUTPATIENT)
Dept: CT IMAGING | Facility: HOSPITAL | Age: 62
Discharge: HOME | End: 2022-10-24
Admitting: INTERNAL MEDICINE

## 2022-10-24 DIAGNOSIS — C50.811 MALIGNANT NEOPLASM OF OVERLAPPING SITES OF RIGHT BREAST IN FEMALE, ESTROGEN RECEPTOR NEGATIVE: ICD-10-CM

## 2022-10-24 DIAGNOSIS — Z17.1 MALIGNANT NEOPLASM OF OVERLAPPING SITES OF RIGHT BREAST IN FEMALE, ESTROGEN RECEPTOR NEGATIVE: ICD-10-CM

## 2022-10-24 LAB — CREAT BLDA-MCNC: 0.8 MG/DL (ref 0.6–1.3)

## 2022-10-24 PROCEDURE — 78306 BONE IMAGING WHOLE BODY: CPT

## 2022-10-24 PROCEDURE — A9503 TC99M MEDRONATE: HCPCS | Performed by: INTERNAL MEDICINE

## 2022-10-24 PROCEDURE — 0 DIATRIZOATE MEGLUMINE & SODIUM PER 1 ML: Performed by: INTERNAL MEDICINE

## 2022-10-24 PROCEDURE — 0 TECHNETIUM MEDRONATE KIT: Performed by: INTERNAL MEDICINE

## 2022-10-24 PROCEDURE — 82565 ASSAY OF CREATININE: CPT

## 2022-10-24 PROCEDURE — 25010000002 IOPAMIDOL 61 % SOLUTION: Performed by: INTERNAL MEDICINE

## 2022-10-24 PROCEDURE — 74177 CT ABD & PELVIS W/CONTRAST: CPT

## 2022-10-24 PROCEDURE — 71260 CT THORAX DX C+: CPT

## 2022-10-24 RX ORDER — TC 99M MEDRONATE 20 MG/10ML
18.4 INJECTION, POWDER, LYOPHILIZED, FOR SOLUTION INTRAVENOUS
Status: COMPLETED | OUTPATIENT
Start: 2022-10-24 | End: 2022-10-24

## 2022-10-24 RX ADMIN — Medication 18.4 MILLICURIE: at 12:06

## 2022-10-24 RX ADMIN — DIATRIZOATE MEGLUMINE AND DIATRIZOATE SODIUM 30 ML: 600; 100 SOLUTION ORAL; RECTAL at 12:58

## 2022-10-24 RX ADMIN — IOPAMIDOL 85 ML: 612 INJECTION, SOLUTION INTRAVENOUS at 12:58

## 2022-10-27 RX ORDER — TRAMADOL HYDROCHLORIDE 50 MG/1
TABLET ORAL
Qty: 90 TABLET | Refills: 0 | Status: SHIPPED | OUTPATIENT
Start: 2022-10-27 | End: 2022-12-05

## 2022-10-28 NOTE — PROGRESS NOTES
"Chief Complaint  Previous Stage Ib ([id number]) ER/DE positive, HER-2/peter negative right breast cancer with subsequent metastatic disease identified 10/8/2017, history of right pulmonary embolism, cancer related pain, chemotherapy-induced diarrhea, chemotherapy-induced mucositis, chemotherapy-induced hand-foot syndrome    Subjective        History of Present Illness  The patient returns today in follow-up continuing on oral Xeloda 1000 mg in the morning, 1500 mg in the evening for 7 days on followed by 7 days off as well as monthly Xgeva and Eliquis 5 mg twice daily.  The patient has CT scan and bone scan to review, being performed currently at 3-month intervals.  She did have Xeloda held for 2 weeks due to worsening difficulty with hand-foot syndrome that was causing worsening sclerodactyly and alteration in her dexterity.  Treatment was held on 10/3/2022 for 1 week and held again for another week on 10/10/2022, resumed on 10/17/2022.  Since then, she feels that her symptoms have been relatively stable and overall her sclerodactyly did improve with 2 weeks off of Xeloda.  She is not experiencing any other significant issues currently.  She did have a mild right medial conjunctival hemorrhage recently and had a very brief pain in her eye at the time it occurred.  She has not experienced any visual change.  She has chronic pain involving her shoulders related to bursitis and this was seen on recent CT.  She does follow with orthopedics with Dr. Middleton in the [facility] system however has not addressed her shoulder pain in the past.  She also reports some recent sinus congestion and occasional cough that she feels is related to allergies and has improved.      Objective   Vital Signs:   /68   Pulse 86   Temp 96.8 °F (36 °C) (Temporal)   Resp 18   Ht 156.8 cm (61.73\")   Wt 84.3 kg (185 lb 14.4 oz)   SpO2 (!) 7%   BMI 34.30 kg/m²     Physical Exam  Constitutional:       Appearance: She is well-developed.      " Comments: Patient ambulates with the use of a cane   Eyes:      Conjunctiva/sclera: Conjunctivae normal.   Neck:      Thyroid: No thyromegaly.   Cardiovascular:      Rate and Rhythm: Normal rate and regular rhythm.      Heart sounds: No murmur heard.    No friction rub. No gallop.   Pulmonary:      Effort: No respiratory distress.      Breath sounds: Normal breath sounds.   Abdominal:      General: Bowel sounds are normal. There is no distension.      Palpations: Abdomen is soft.      Tenderness: There is no abdominal tenderness.   Musculoskeletal:      Comments: Braces in place in the bilateral lower extremities   Lymphadenopathy:      Head:      Right side of head: No submandibular adenopathy.      Cervical: No cervical adenopathy.      Upper Body:      Right upper body: No supraclavicular adenopathy.      Left upper body: No supraclavicular adenopathy.   Skin:     General: Skin is warm and dry.      Findings: Rash present.      Comments: Erythema involving the bilateral palms and extending into the dorsum of the hand is stable.  There is minimal skin peeling noted.  The degree of sclerodactyly however has improved since her last exam   Neurological:      Mental Status: She is alert and oriented to person, place, and time.      Cranial Nerves: No cranial nerve deficit.      Motor: No abnormal muscle tone.      Deep Tendon Reflexes: Reflexes normal.   Psychiatric:         Behavior: Behavior normal.         Result Review : Reviewed CBC, CMP, magnesium, phosphorus from today.  Reviewed CT chest abdomen pelvis and bone scan from 10/24/2022.     Assessment and Plan     1. Previous Stage Ib ([id number]) right breast cancer:  · Diagnosed May 2010 with excisional biopsy for invasive ductal carcinoma, 1.3 cm, grade 2, ER 90%, ID 80%, HER-2/peter negative (1+ IHC).    · Subsequent right mastectomy in July 2010 with no residual breast malignancy, 1/5 sentinel lymph node with micrometastasis (0.25 mm).    · Treated in the  [facility] system with adjuvant AC ×4 cycles in 2010 (no taxanes administered due to underlying Charcot-Saloni-Tooth with peripheral neuropathy).    · Adjuvant Femara (postmenopausal) initiated October 2010 with plan to treat ×10 years.    · Genetic testing reportedly negative.    · Developed osteopenia treated with Prolia beginning 2/27/13. Subsequently discontinued due to identification of metastatic disease.  2. Recurrent/metastatic disease identified 10/8/17:  · Disease involving thoracic spine with cord compression at T6, lumbosacral involvement, sternal and right sternoclavicular involvement.    · Femara discontinued in 10/2017.    · Radiation administered (in the [facility] system) to the thoracic spine beginning 10/19/17 treating T3-T9 to a dose of 24 gray in 6 fractions.  · Evaluation with MRI 12/8/17 showing persistent T6 cord compression with persistent neurologic compromise requiring surgical treatment 12/11/17 with T6 laminectomy/corpectomy and T3-T9 fusion.  Pathology with metastatic carcinoma of breast origin, ER negative, NJ negative, HER-2/peter negative (1+ IHC).  · Additional staging evaluation 12/8/17 with no evidence of visceral metastatic disease, bone scan showing involvement of thoracic spine, sternum, left humerus, mid frontal bone.  No plane film correlate of left humerus lesion.  MRI lumbar spine with small intradural L3 metastasis.  CA 15-3 12/6/17- 17.  · Palliative radiation therapy to L3 dural metastasis and left humerus initiated 1/15/18 (10 fractions), completed 1/26/18.  · Hypercalcemia of malignancy with calcium in the 10-11 range.  · Initiation of monthly Xgeva 1/23/18.  · Baseline CT scan 1/30/18 with no evidence of visceral involvement.  Cluster of nodular opacities in the right lower lobe suspected to be infectious or related to bronchiolitis. Bone scan 1/30/18 showed postsurgical change in the thoracic spine, stable uptake in the frontal bone, no new areas of disease.  · Initiation of  palliative oral single agent Xeloda 2/7/18 2000 mg a.m., 1500 mg p.m. for 14/21 days.   · Following 3 cycles xeloda, bone scan 4/4/18 showed no change from the prior study.  CT scan 4/4/18 showed a small pericardial effusion of unclear significance as well as a subcutaneous nodule in the right lateral chest wall.  Subsequent evaluation with echocardiogram 4/17/18 showed no evidence of pericardial effusion.  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  We discussed the possibility of surgical excision of the right subcutaneous chest wall lesion for more definitive diagnosis.  Reviewed previous CT images dating back to 12/8/17 and the lesion was present even at that time measuring around 1.7 cm although not commented on in the radiology report.  As this appears to be an indolent low-grade process unrelated to her breast cancer, recommendee foregoing surgical excision at this time and monitoring this area on future scans.  The patient agreed.    · Following 6 cycles of Xeloda, CT 6/6/18  showed stable findings, no evidence of progressive disease.  There was a comment regarding subcutaneous abnormality in the anterior abdominal wall and this was related to Lovenox injection sites.  Bone scan 6/6/18 showed no interval change.   · CT scan and bone scan 8/13/18 following 9 cycles of Xeloda showed stable findings with no evidence of significant visceral metastases.  Her bone lesions appear stable on bone scan.  The spindle cell neoplasm in her right chest wall actually decreased in size from 2 cm down to 1.6 cm.    · The patient experienced some symptoms of diarrhea, anorexia, generalized weakness during cycle 9 Xeloda it was unclear whether this was related to a viral gastroenteritis or toxicity from treatment.  Symptoms recurred during cycle 10 and treatment was cut short by 2  days.  Symptoms attributed to Xeloda.  With cycle 11, dose and schedule altered to 1500 mg twice daily for 7 days on followed by 7 days off .  · CT scan 9/9/2020 with no significant changes.  Bone scan 9/9/2020 read as unchanged from prior studies however did note an area of slight activity in the medial left femur.  Contacted radiology and although this was not noted on prior reports appears to have been present.  Subsequent MRI left femur 9/21/2020 with cortical thickening and periosteal edema left iliac us muscle insertion to the medial left femur with no evidence of metastatic disease, favored to represent periosteal change secondary to insertional tendinitis.  · Severe hand-foot symptoms causing sclerodactyly and limitation in finger movement prompted change in dosing in July 2021 with Xeloda decreased to 1000 mg a.m., 1500 mg p.m. for 7 days on followed by 7 days off.  · Patient was experiencing severe hand-foot syndrome related to Xeloda having developed skin peeling, sclerodactyly with limited use of her hands.  On 3/31/2022, Xeloda was held to allow for recovery.  Patient resumed Xeloda on 4/18/2022.  · Patient required hospitalization 5/29 - 6/1/2022 with presumed viral gastroenteritis that was exacerbated by Xeloda.  Xeloda held briefly, resumed on 6/6/2022.  · Patient again with worsening sclerodactyly, Xeloda held for 2 weeks from 10/3 - 10/17/2022, resumed at prior dose with improvement in symptoms.  · Most recent scans from 10/24/2022 with CT chest abdomen pelvis that showed stable findings including left supraclavicular lymph node, right lower lobe pulmonary nodules, bony lesions.  Bone scan with stable findings.  · The patient returns today continuing on treatment with Xeloda 1000 mg a.m., 1500 mg p.m. 7 days on followed by 7 days off (due to resume Xeloda today).  She also continues on monthly Xgeva that is due today.  We reviewed her recent scans from 10/24/2022 performed at a 4-month interval  which showed stable findings on CT scans and bone scan.  We recently did have to hold her Xeloda again due to worsening sclerodactyly that was affecting her dexterity in the hands.  Treatment was held for 2 weeks from 10/3 - 10/17/2022 and with some improvement in symptoms was resumed at her prior dose.  Today, findings in her hands are stable with sclerodactyly that does persist but is manageable in terms of her dexterity.  The erythema is stable and there is only a minimal degree of scaling and no significant peeling currently she feels that the symptoms are tolerable for now.  We will therefore proceed on with treatment as planned.  We discussed that if she remains stable clinically we will plan repeat scans again at a 4-month interval.  She return in 4 weeks for NP visit and Xgeva and also return in 8 weeks (1 day delay due to the Christmas holiday) on 12/27/2022 for NP visit and Xgeva.  I will see her shortly thereafter on 1/9/2022 with labs to review we will discuss timeframe for follow-up scans.  3. Right pulmonary embolism:  · Diagnosed on CT angiogram 10/21/17 involving small right lower lobe pulmonary artery.  Lower extremity Dopplers negative.  · Bilateral lower extremity Dopplers negative again 12/5/17.  · Received chronic Lovenox 1 mg/kg twice per day, transition to oral Eliquis in February 2019, continuing on Eliquis 5 mg twice daily.  · Patient continues on anticoagulation without any complications.  4. Cancer related pain:  · Previously receiving Duragesic 50 µg patch every 72 hours along with Dilaudid 4 mg as needed for breakthrough pain  · The patient's pain improved over time and she was able to discontinue both Duragesic and Dilaudid in the interval.  · Patient does take occasional Flexeril at bedtime due to back spasm/pain when she has been more active.  · The patient does have some occasional aggravation of her chronic back pain and does use tramadol 50 mg every 8 hours as needed  · Patient's  pain is stable.  She does continue to use tramadol 50 mg every 8-12 hours as needed which has been effective.  The patient does have chronic difficulty with pain involving her low back, left shoulder.  Patient notes that her pain does wax and wane.  Physical therapy did help.  5. Chemotherapy-induced diarrhea with subsequent C. difficile colitis in the setting of previous ulcerative colitis and then identification of enteropathogenic E. coli:  · Patient with reported history of ulcerative colitis, continues on mesalamine.  · The patient developed initial diarrhea related to Xeloda at regular dosing.  · Symptoms improved on reduced dose Xeloda  · Flare of symptoms in October 2018 with apparent finding of C. difficile colitis by GI, treated with course of oral vancomycin with improvement in symptoms.  · Patient notes minimal intermittent diarrhea/loose stools on Xeloda requiring occasional dosing of Imodium.    · Patient required hospitalization 5/29 - 6/1/2022 with presumed viral gastroenteritis that was exacerbated by Xeloda.  Xeloda held briefly, resumed on 6/6/2022.  · Patient's  developed C. difficile colitis and patient was experiencing increase in diarrhea.  Stool studies performed 8/4/22 negative C. difficile however GI PCR was positive for enteropathogenic E. coli.  Given patient's symptoms and immunocompromise status it was elected to treat her with azithromycin 500 mg daily x3 days beginning 8/5/2022  · Patient reports that diarrhea resolved.  6. Traumatic left tibia/fibular fracture:  · Status post ORIF 12/6/17  · Specimen was sent for pathologic review, negative for evidence of malignancy  7. Hypercalcemia:  · Suspect hypercalcemia of malignancy, calcium in  10-11 range previously.  · Calcium normalized following initiation of monthly Xgeva on 1/23/18.  8. Chemotherapy-induced mucositis:  · Patient had a minimal degree of mucositis with cycle 2.  The patient has magic mouthwash to use as needed.   No subsequent mucositis.  9. Recurrent UTI, bladder wall thickening on CT:  · Patient had an enterococcal UTI on 3/2/18 sensitive to nitrofurantoin and received treatment, unclear how long.  · Recurrent UTI 3/20/18 with urine culture growing Klebsiella, initially treated with nitrofurantoin, transitioned to Levaquin.  · CT 4/4/18 with diffuse bladder wall thickening with increased nodular thickening at the left base.  Referral to urogynecology Dr. May Johnson.  She was placed on a prophylactic dose of trimethoprim 100 mg daily, bladder wall thickening felt to be related to recent recurrent urinary tract infections.  · Patient with urinary symptoms, treated with course of Macrobid at the end of December 2018, urine culture however was negative 12/31/18.  · Patient was found to have Klebsiella UTI 7/29/2019 which was successfully treated with Macrobid with complete resolution of symptoms.  · CT 8/10/2021 with diffuse bladder wall thickening new from 5/17/2021 (however seen on multiple prior scans).    · UTI on 10/18/2021 with E. coli greater than 100,000 colonies that was pansensitive, treated with Macrobid x7 days  · With ongoing/recurrent UTIs patient was seen by urogynecology and initiated suppressive therapy with trimethoprim 100 mg daily in December 2021.  She has not experienced any further urinary tract infections.  · CT abdomen pelvis 3/28/2022 does show diffuse thickening of urinary bladder as has been seen on prior studies indicative of cystitis.  · Patient notes that she did stop taking trimethoprim on a daily basis.  She did follow-up recently with urogynecology, aware that she is off of suppressive antibiotics.  Fortunately however she has not experienced any recurrent UTIs recently.  10.   Mobility:  · The patient underwent an intensive course of rehabilitation at [facility].  She graduated from her outpatient course November 2018.  · Overall the patient has improved dramatically in terms of mobility,    · Patient has been continuing to walk for exercise on a regular basis.  11.  Depression:  · The patient is continuing follow-up in the supportive oncology clinic and is currently continuing on Cymbalta 30 mg twice daily as well as gabapentin 300 mg at dinner and 300 mg nightly, temazepam 15 mg nightly as needed, Provigil 100 mg daily.  · Patient does continue to attend support groups at [facility].  · The patient does continue routine follow-up in supportive oncology clinic.    · Patient does have multiple stressors with her 's malignancy and chemotherapy as well as her autistic son who is living with them at home.  · Patient last seen by supportive oncology 8/5/2022  · Patient today is somewhat tearful in regards to her 's medical situation.  12. 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  Symptoms did improve and she continued on the same dose.    · Patient was referred to dermatology and has been continuing on triamcinolone 0.1% cream used for 1 week on followed by 1 week off which led to some further improvement.   · Progressive palmar erythema with development of sclerodactyly and effect on dexterity.  Addition of urea-based cream 3 times daily.  · Patient with continued difficulty regarding erythema of her hands that extended onto the dorsal aspect and was causing contractures/sclerodactyly in her fingers affecting her dexterity.  In July 2021, held Xeloda for an additional week and then reduced dose from 1500 mg twice daily down to 1000 mg a.m. and 1500 mg p.m. and continued on a 7-day on followed by 7-day off schedule.  · With reduction in Xeloda dose, slight improvement in erythema involving dorsal aspect of the hands and slight decrease in sclerodactyly  · Patient was experiencing severe hand-foot syndrome  related to Xeloda having developed skin peeling, sclerodactyly with limited use of her hands.  On 3/31/2022, Xeloda was held to allow for recovery.  Patient resumed Xeloda on 4/18/2022.  · Patient developed worsening sclerodactyly affecting dexterity that required Xeloda to again be briefly held for 2 weeks from 10/3 - 10/17/2022.  Treatment resumed at prior dose after improvement in symptoms.  · Patient continues to use emollient cream frequently (currently 5 times daily) and topical triamcinolone 0.1% twice daily intermittently (2 weeks on followed by 2 weeks off as instructed by dermatology).  The degree of sclerodactyly has improved after her 2-week break from treatment.  It does persist but is not affecting her dexterity significantly at this time.  She does have ongoing erythema that extends into the dorsal aspect of the hands but is fairly stable and mild, slight skin scaling noted currently.  13. Evidence of steatosis on scans with mild intermittent elevated liver function studies:  · Liver function studies increased 8/20/19 with ALT 98, AST 70, normal total bilirubin.  · Negative viral hepatitis A, B, and C panel 8/23/18  · Likely related to hepatic steatosis.   · Subsequent improvement in LFTs  · LFTs today are normal  14. Chemotherapy induced leukopenia:  · WBC today  is stable at 3.4 to  15. GERD, question of celiac disease:  · Patient with significant history of reflux  · Patient currently receiving Protonix 40 mg daily daily and Carafate 1 g 4 times daily as needed  · Patient required hospitalization 5/29 - 6/1/2022 with presumed viral gastroenteritis that was exacerbated by Xeloda.    · EGD performed 5/31/2022 during hospitalization with biopsy that showed focal blunting of villi and atrophy with slight increase in intraepithelial lymphocytes.  Changes were minimal but recommended correlation with serology to exclude celiac disease.  · Celiac serology 8/8/2022 negative  · Patient previously developed  worsening reflux symptoms, temporarily increase Protonix to 40 mg twice daily and we did add Carafate 1 g 4 times daily.    · Today she reports improved symptoms.  She is taking Protonix 40 mg once daily, is not taking Carafate.  16. Insomnia:  · Patient with prior paradoxical reaction to Benadryl  · Improved previously on temazepam 15 mg nightly as needed.   · Patient noted subsequently that temazepam was having no effect.   · Symptoms currently stable on gabapentin 300 mg at dinner and 300 mg nightly as well as temazepam 15 mg nightly as needed  17. Health maintenance:  · Patient notes that she has a history of colon polyps as well as ulcerative colitis and was due for a follow-up colonoscopy on 9/12/2019.  We did discuss there is no necessity to pursue colonoscopy in the setting of her metastatic breast cancer.    · The patient did undergo colonoscopy on 2/7/2020 with findings of muscular hypertrophy and diverticulosis.   · See above discussion, note patient plans to proceed with colonoscopy, currently scheduled for 12/20/2022  18. Bilateral shoulder pain:  · Patient was evaluated by Dr Forrester.  She has experienced difficulty over a long time frame with right shoulder although she has not complained of this in prior visits to our office.  She reported difficulty with abduction.    · The patient did undergo MRI of her right shoulder on 11/21/2019 at an outside facility showing multiple abnormalities including supraspinatus tendinosis, labral tear but no evidence of metastatic disease.  · Patient developed pain in the left shoulder, was seen by orthopedics and underwent steroid injection with some improvement.  Right shoulder stable.  Patient did undergo physical therapy with some improvement.  She continues to use tramadol 50 mg every 8-12 hours as needed.  · Note that current CT 10/20/2022 made notation of left shoulder probable bursitis.  We discussed that she should return to see orthopedics and discuss  further steroid injection.  19. Ocular changes in part related to Xeloda:  · Patient experienced a mild degree of blurred vision as well as burning and pruritus.  · She was seen by her ophthalmologist and was placed on xiidra ophthalmic drops which have helped.  · Likely both issues are to some extent related to Xeloda.  · Symptoms did worsen and patient was seen by a new ophthalmologist at [facility].  She is now using an ocular lubricant at night in addition to artificial tears which have helped.  20. Elevated glucose:  · It is noted the patient's glucose at the last few visits has been in the high 100 range, postprandial.  · She has had a hemoglobin A1c that has been in the high 5-6 range in the past, on 5/1/2019 at 5.7  · Hemoglobin A1c was last checked in 11/12/2021 at 5.8  21. Possible loosening of pedicle screw at T3:  · CT cervical spine 5/17/2021 showed loosening of the left pedicle screw at T3.  Patient referred to orthopedics and was seen by Dr. Nayak who felt that there were no concerning findings on the scan  22. COVID-19 vaccination  · Patient received the Pfizer vaccine, second dose administered on 4/2/2021 without side effects.  · Patient received her third dose Pfizer COVID-19 injection in August 2021  23. Iron deficiency anemia  · Labs on 5/2/2022 with hemoglobin 10.8.  Additional labs with iron 48, ferritin 21.2, iron saturation 11%, TIBC 4 and 32, folate greater than 20, B12 greater than 2000.    · Attempted treatment with oral iron daily however patient was intolerant  · Patient subsequently received Venofer 300 mg weekly x4 beginning 6/6/2022 and completed on 6/27/2022  · Subsequent iron studies on 7/11/2022 showed improvement with iron 62, ferritin 345, iron saturation 18%, TIBC 336.  Hemoglobin had improved up to 12.7 at that time.  · Repeat iron studies on 10/3/2022 showed iron replete status with hemoglobin 13.7, iron 121, ferritin 208.7, iron saturation 31%, TIBC  392.  · Hemoglobin currently normal at 13.9     Plan:  1. Continue palliative single agent Xeloda 1000 mg a.m., 1500 mg in the p.m. 7 days on followed by 7 days off (patient initiating a new week of Xeloda today)  2. Proceed with monthly Xgeva today  3. Continue Eliquis 5 mg twice daily  4. The patient will continue frequent use of emollient cream currently 5 times daily and add continue periodic triamcinolone cream 0.1% twice daily (provided by dermatology) 2 weeks on followed by 2 weeks off as prescribed (asked patient to resume this today)  5. Patient will continue Protonix 40 mg daily  6. Continue ocular lubricating gel nightly per ophthalmology  7. Continue Provigil 100 mg daily and Cymbalta 30 mg twice daily, gabapentin 300 mg at dinner and 300 mg at night per supportive oncology clinic.  Patient continues routine follow-up with supportive oncology   8. Continue temazepam 15 mg nightly.  9. In 4 weeks NP visit with CBC, CMP, magnesium, phosphorus and Xgeva  10. In 8 weeks nurse practitioner visit (delayed to Tuesday, 12/27/2022 due to Christmas holiday) with CBC, CMP, magnesium, phosphorus and Xgeva  11. Dr. Hancock visit on 1/9/2022 with CBC, CMP.  We will assess her clinical status and discuss timeframe for follow-up scans either at a 3 or possibly 4-month interval.     Patient continuing on high risk medication requiring intensive monitoring.       I did spend 40 minutes in total time caring for this patient today, time spent in review of records, face-to-face consultation, coordination of care, placement of orders, completion of documentation.

## 2022-10-31 ENCOUNTER — LAB (OUTPATIENT)
Dept: LAB | Facility: HOSPITAL | Age: 62
End: 2022-10-31

## 2022-10-31 ENCOUNTER — OFFICE VISIT (OUTPATIENT)
Dept: ONCOLOGY | Facility: CLINIC | Age: 62
End: 2022-10-31

## 2022-10-31 ENCOUNTER — INFUSION (OUTPATIENT)
Dept: ONCOLOGY | Facility: HOSPITAL | Age: 62
End: 2022-10-31

## 2022-10-31 VITALS
HEIGHT: 62 IN | DIASTOLIC BLOOD PRESSURE: 68 MMHG | RESPIRATION RATE: 18 BRPM | TEMPERATURE: 96.8 F | OXYGEN SATURATION: 7 % | BODY MASS INDEX: 34.21 KG/M2 | WEIGHT: 185.9 LBS | HEART RATE: 86 BPM | SYSTOLIC BLOOD PRESSURE: 140 MMHG

## 2022-10-31 DIAGNOSIS — C50.811 MALIGNANT NEOPLASM OF OVERLAPPING SITES OF RIGHT BREAST IN FEMALE, ESTROGEN RECEPTOR NEGATIVE: Primary | ICD-10-CM

## 2022-10-31 DIAGNOSIS — C50.811 MALIGNANT NEOPLASM OF OVERLAPPING SITES OF RIGHT BREAST IN FEMALE, ESTROGEN RECEPTOR NEGATIVE: ICD-10-CM

## 2022-10-31 DIAGNOSIS — Z17.1 MALIGNANT NEOPLASM OF OVERLAPPING SITES OF RIGHT BREAST IN FEMALE, ESTROGEN RECEPTOR NEGATIVE: Primary | ICD-10-CM

## 2022-10-31 DIAGNOSIS — Z17.1 MALIGNANT NEOPLASM OF OVERLAPPING SITES OF RIGHT BREAST IN FEMALE, ESTROGEN RECEPTOR NEGATIVE: ICD-10-CM

## 2022-10-31 LAB
ALBUMIN SERPL-MCNC: 4.3 G/DL (ref 3.5–5.2)
ALBUMIN/GLOB SERPL: 1.5 G/DL (ref 1.1–2.4)
ALP SERPL-CCNC: 63 U/L (ref 38–116)
ALT SERPL W P-5'-P-CCNC: 15 U/L (ref 0–33)
ANION GAP SERPL CALCULATED.3IONS-SCNC: 13.1 MMOL/L (ref 5–15)
AST SERPL-CCNC: 21 U/L (ref 0–32)
BASOPHILS # BLD AUTO: 0.04 10*3/MM3 (ref 0–0.2)
BASOPHILS NFR BLD AUTO: 1.2 % (ref 0–1.5)
BILIRUB SERPL-MCNC: 0.4 MG/DL (ref 0.2–1.2)
BUN SERPL-MCNC: 26 MG/DL (ref 6–20)
BUN/CREAT SERPL: 33.8 (ref 7.3–30)
CALCIUM SPEC-SCNC: 9.7 MG/DL (ref 8.5–10.2)
CHLORIDE SERPL-SCNC: 105 MMOL/L (ref 98–107)
CO2 SERPL-SCNC: 25.9 MMOL/L (ref 22–29)
CREAT SERPL-MCNC: 0.77 MG/DL (ref 0.6–1.1)
DEPRECATED RDW RBC AUTO: 47.8 FL (ref 37–54)
EGFRCR SERPLBLD CKD-EPI 2021: 87.9 ML/MIN/1.73
EOSINOPHIL # BLD AUTO: 0.15 10*3/MM3 (ref 0–0.4)
EOSINOPHIL NFR BLD AUTO: 4.4 % (ref 0.3–6.2)
ERYTHROCYTE [DISTWIDTH] IN BLOOD BY AUTOMATED COUNT: 13.5 % (ref 12.3–15.4)
GLOBULIN UR ELPH-MCNC: 2.8 GM/DL (ref 1.8–3.5)
GLUCOSE SERPL-MCNC: 111 MG/DL (ref 74–124)
HCT VFR BLD AUTO: 41.7 % (ref 34–46.6)
HGB BLD-MCNC: 13.9 G/DL (ref 12–15.9)
IMM GRANULOCYTES # BLD AUTO: 0.01 10*3/MM3 (ref 0–0.05)
IMM GRANULOCYTES NFR BLD AUTO: 0.3 % (ref 0–0.5)
LYMPHOCYTES # BLD AUTO: 1.23 10*3/MM3 (ref 0.7–3.1)
LYMPHOCYTES NFR BLD AUTO: 36 % (ref 19.6–45.3)
MAGNESIUM SERPL-MCNC: 1.8 MG/DL (ref 1.8–2.5)
MCH RBC QN AUTO: 33.3 PG (ref 26.6–33)
MCHC RBC AUTO-ENTMCNC: 33.3 G/DL (ref 31.5–35.7)
MCV RBC AUTO: 99.8 FL (ref 79–97)
MONOCYTES # BLD AUTO: 0.24 10*3/MM3 (ref 0.1–0.9)
MONOCYTES NFR BLD AUTO: 7 % (ref 5–12)
NEUTROPHILS NFR BLD AUTO: 1.75 10*3/MM3 (ref 1.7–7)
NEUTROPHILS NFR BLD AUTO: 51.1 % (ref 42.7–76)
NRBC BLD AUTO-RTO: 0 /100 WBC (ref 0–0.2)
PHOSPHATE SERPL-MCNC: 3.7 MG/DL (ref 2.5–4.5)
PLATELET # BLD AUTO: 296 10*3/MM3 (ref 140–450)
PMV BLD AUTO: 9.7 FL (ref 6–12)
POTASSIUM SERPL-SCNC: 4.9 MMOL/L (ref 3.5–4.7)
PROT SERPL-MCNC: 7.1 G/DL (ref 6.3–8)
RBC # BLD AUTO: 4.18 10*6/MM3 (ref 3.77–5.28)
SODIUM SERPL-SCNC: 144 MMOL/L (ref 134–145)
WBC NRBC COR # BLD: 3.42 10*3/MM3 (ref 3.4–10.8)

## 2022-10-31 PROCEDURE — 84100 ASSAY OF PHOSPHORUS: CPT

## 2022-10-31 PROCEDURE — 96372 THER/PROPH/DIAG INJ SC/IM: CPT

## 2022-10-31 PROCEDURE — 80053 COMPREHEN METABOLIC PANEL: CPT

## 2022-10-31 PROCEDURE — 25010000002 DENOSUMAB 120 MG/1.7ML SOLUTION: Performed by: INTERNAL MEDICINE

## 2022-10-31 PROCEDURE — 99215 OFFICE O/P EST HI 40 MIN: CPT | Performed by: INTERNAL MEDICINE

## 2022-10-31 PROCEDURE — 83735 ASSAY OF MAGNESIUM: CPT

## 2022-10-31 PROCEDURE — 85025 COMPLETE CBC W/AUTO DIFF WBC: CPT

## 2022-10-31 PROCEDURE — 36415 COLL VENOUS BLD VENIPUNCTURE: CPT

## 2022-10-31 RX ADMIN — DENOSUMAB 120 MG: 120 INJECTION SUBCUTANEOUS at 12:33

## 2022-11-08 ENCOUNTER — SPECIALTY PHARMACY (OUTPATIENT)
Dept: PHARMACY | Facility: HOSPITAL | Age: 62
End: 2022-11-08

## 2022-11-08 NOTE — PROGRESS NOTES
Specialty Pharmacy Refill Coordination Note     Martha is a 61 y.o. female contacted today regarding refills of  Xeloda specialty medication(s).    Reviewed and verified with patient:       Specialty medication(s) and dose(s) confirmed: yes    Refill Questions    Flowsheet Row Most Recent Value   Changes to allergies? No   Changes to medications? No   New conditions since last clinic visit No   Unplanned office visit, urgent care, ED, or hospital admission in the last 4 weeks  No   How does patient/caregiver feel medication is working? Good   Financial problems or insurance changes  No   Since the previous refill, were any specialty medication doses or scheduled injections missed or delayed?  No   Does this patient require a clinical escalation to a pharmacist? No          Delivery Questions    Flowsheet Row Most Recent Value   Delivery method [employer]  [[employer] priority sig required ship 11/10 deliver 11/11]   Delivery address correct? Yes   Preferred delivery time? AM   Number of medications in delivery 1   Medication being filled and delivered Xeloda   Doses left of specialty medications 12 pills   Is there any medication that is due not being filled? No   Supplies needed? No supplies needed   Cooler needed? No   Do any medications need mixed or dated? No   Copay form of payment Payment plan already set up   Questions or concerns for the pharmacist? No   Are any medications first time fills? No                 Follow-up: 3 week(s)     Ami Hudson  Specialty Pharmacy Technician

## 2022-11-09 ENCOUNTER — TELEMEDICINE (OUTPATIENT)
Dept: PSYCHIATRY | Facility: CLINIC | Age: 62
End: 2022-11-09

## 2022-11-09 DIAGNOSIS — F41.1 GENERALIZED ANXIETY DISORDER: Primary | ICD-10-CM

## 2022-11-09 PROCEDURE — 90832 PSYTX W PT 30 MINUTES: CPT | Performed by: COUNSELOR

## 2022-11-09 NOTE — PROGRESS NOTES
Date: November 10, 2022  Time In: 1000  Time Out: 1030  This provider is located at the [facility] [address], [place] [address] using a secure Youtuohart Video Visit through [employer]. Patient is being seen remotely via telehealth at home address in [place] and stated they are in a secure environment for this session. The patient's condition being diagnosed/treated is appropriate for telemedicine. The provider identified herself as well as her credentials. The patient, and/or patients guardian, consent to be seen remotely, and when consent is given they understand that the consent allows for patient identifiable information to be sent to a third party as needed. They may refuse to be seen remotely at any time. The electronic data is encrypted and password protected, and the patient and/or guardian has been advised of the potential risks to privacy not withstanding such measures.     You have chosen to receive care through a telehealth visit.  Do you consent to use a video/audio connection for your medical care today? Yes    PROGRESS NOTE  Data:  Martha Davey is a 61 y.o. female who presents today for follow up    Chief Complaint: anxiety    History of Present Illness: Pt discussed 's perspective on political topics and how she feels alone in her home due to her perspective being different compared to everyone else in the household. Pt reports feeling as if her emotions do not get considered at times. Pt reports that she was walking in her neighborhood had a dangerous situation occur that caused her sons to ask her not to walk. Pt reports that this confused her due to her being a grown adult that has always took care of herself. Pt reports she will continue to walk her regular routine due to this being her healthy outlet.       Clinical Maneuvering/Intervention:    (Scales based on 0 - 10 with 10 being the worst)  Depression: 2 Anxiety: 2        Assisted patient in processing above session content; acknowledged and normalized patient’s thoughts, feelings, and concerns.  Rationalized patient thought process regarding current stressors.  Discussed triggers associated with patient's anxiety.  Also discussed coping skills for patient to implement such as using an alternative perspective to better understand or dismiss sons' and 's behaviors.    Allowed patient to freely discuss issues without interruption or judgment. Provided safe, confidential environment to facilitate the development of positive therapeutic relationship and encourage open, honest communication. Assisted patient in identifying risk factors which would indicate the need for higher level of care including thoughts to harm self or others and/or self-harming behavior and encouraged patient to contact this office, call 911, or present to the nearest emergency room should any of these events occur. Discussed crisis intervention services and means to access. Patient adamantly and convincingly denies current suicidal or homicidal ideation or perceptual disturbance.    Assessment:   Assessment   Patient appears to maintain relative stability as compared to their baseline.  However, patient continues to struggle with anxiety which continues to cause impairment in important areas of functioning.  A result, they can be reasonably expected to continue to benefit from treatment and would likely be at increased risk for decompensation otherwise.    Mental Status Exam:   Hygiene:   good  Cooperation:  Cooperative  Eye Contact:  Good  Psychomotor Behavior:  Appropriate  Affect:  Full range  Mood: normal  Speech:  Normal  Thought Process:  Linear  Thought Content:  Normal  Suicidal:  None  Homicidal:  None  Hallucinations:  None  Delusion:  None  Memory:  Intact  Orientation:  Person, Place, Time and Situation  Reliability:  fair  Insight:  Fair  Judgement:  Fair  Impulse Control:   Fair  Physical/Medical Issues:  No        Patient's Support Network Includes:      Functional Status: Mild impairment     Progress toward goal: Not at goal    Prognosis: Fair with Ongoing Treatment          Plan:    Patient will continue in individual outpatient therapy with focus on improved functioning and coping skills, maintaining stability, and avoiding decompensation and the need for higher level of care.    Patient will adhere to medication regimen as prescribed and report any side effects. Patient will contact this office, call 911 or present to the nearest emergency room should suicidal or homicidal ideations occur. Provide Cognitive Behavioral Therapy and Solution Focused Therapy to improve functioning, maintain stability, and avoid decompensation and the need for higher level of care.     Return in about 4 weeks, or earlier if symptoms worsen or fail to improve.           VISIT DIAGNOSIS:     ICD-10-CM ICD-9-CM   1. Generalized anxiety disorder  F41.1 300.02        Diagnoses and all orders for this visit:    1. Generalized anxiety disorder (Primary)           [facility] No Show Policy:  We understand unexpected circumstances arise; however, anytime you miss your appointment we are unable to provide you appropriate care.  In addition, each appointment missed could have been used to provide care for others.  We ask that you call at least 24 hours in advance to cancel or reschedule an appointment.  We would like to take this opportunity to remind you of our policy stating patients who miss THREE or more appointments without cancelling or rescheduling 24 hours in advance of the appointment may be subject to cancellation of any further visits with our clinic and recommendation to seek in-person services/visits.    Please call [phone] to reschedule your appointment. If there are reasons that make it difficult for you to keep the appointments, please call and let us know how  we can help.  Please understand that medication prescribing will not continue without seeing your provider.      [facility] No Show Policy reviewed with patient at today's visit. Patient verbalized understanding of this policy. Discussed with patient that in the event that there are three or more no show visits, it will be recommended that they pursue in-person services/visits as noncompliance with telehealth visits indicates that patient is not an appropriate candidate for telemedicine and would likely be more appropriate for in-person services/visits. Patient verbalizes understanding and is agreeable to this.        This document has been electronically signed by Sophia Barron LCSW  November 10, 2022 07:04 EST      Part of this note may be an electronic transcription/translation of spoken language to printed text using the Dragon Dictation System.

## 2022-11-23 NOTE — PROGRESS NOTES
"Chief Complaint  Previous Stage Ib ([id number]) ER/AZ positive, HER-2/peter negative right breast cancer with subsequent metastatic disease identified 10/8/2017, history of right pulmonary embolism, cancer related pain, chemotherapy-induced diarrhea, chemotherapy-induced mucositis, chemotherapy-induced hand-foot syndrome    Subjective        History of Present Illness  The patient returns today in follow-up continuing on oral Xeloda 1000 mg in the morning, 1500 mg in the evening for 7 days on followed by 7 days off as well as monthly Xgeva and Eliquis 5 mg twice daily.  She is due today for monthly Xgeva.    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  She found some lavender moisturized gloves that she has been using and she feels is not really helping.  There is a noticeable difference clinically with her having more mobility of her hands, decreased redness and cracking.  All-around significant surprising improvement.    She is noting her bowels slowing down a bit not being as loose as typical on Xeloda.    Otherwise she denies any new concerns today.    Objective   Vital Signs:   /79   Pulse 98   Temp 98 °F (36.7 °C) (Temporal)   Resp 18   Ht 156.8 cm (61.73\")   Wt 84.8 kg (186 lb 14.4 oz)   SpO2 96%   BMI 34.48 kg/m²     Physical Exam  Constitutional:       Appearance: She is well-developed.      Comments: Patient ambulates with the use of a cane   Eyes:      Conjunctiva/sclera: Conjunctivae normal.   Neck:      Thyroid: No thyromegaly.   Cardiovascular:      Rate and Rhythm: Normal rate and regular rhythm.      Heart sounds: No murmur heard.    No friction rub. No gallop.   Pulmonary:      Effort: No respiratory distress.      Breath sounds: Normal breath sounds.   Abdominal:      General: Bowel sounds are normal. There is no distension.      Palpations: Abdomen is soft.      Tenderness: There is no abdominal tenderness.   Musculoskeletal:      Comments: Braces in place in the " Unable to tolerate nasal cannula. Pt pulled out of nose with cannula stickers in place. Pt o2 sat 94% on RA. Transferred to [facility] via peds transport team to [address] [place] [address] 254 [phone].   bilateral lower extremities   Lymphadenopathy:      Head:      Right side of head: No submandibular adenopathy.      Cervical: No cervical adenopathy.      Upper Body:      Right upper body: No supraclavicular adenopathy.      Left upper body: No supraclavicular adenopathy.   Skin:     General: Skin is warm and dry.      Findings: Rash present.      Comments: Significant improvement in sclerodactyly with more pliability of the skin, decreased redness and no cracking.   Neurological:      Mental Status: She is alert and oriented to person, place, and time.      Cranial Nerves: No cranial nerve deficit.      Motor: No abnormal muscle tone.      Deep Tendon Reflexes: Reflexes normal.   Psychiatric:         Behavior: Behavior normal.         Result Review :   Results from last 7 days   Lab Units 11/28/22  0929   WBC 10*3/mm3 5.02   NEUTROS ABS 10*3/mm3 3.38   HEMOGLOBIN g/dL 13.4   HEMATOCRIT % 40.9   PLATELETS 10*3/mm3 262                  Assessment and Plan     1. Previous Stage Ib ([id number]) right breast cancer:  · Diagnosed May 2010 with excisional biopsy for invasive ductal carcinoma, 1.3 cm, grade 2, ER 90%, KS 80%, HER-2/peter negative (1+ IHC).    · Subsequent right mastectomy in July 2010 with no residual breast malignancy, 1/5 sentinel lymph node with micrometastasis (0.25 mm).    · Treated in the [facility] system with adjuvant AC ×4 cycles in 2010 (no taxanes administered due to underlying Charcot-Saloni-Tooth with peripheral neuropathy).    · Adjuvant Femara (postmenopausal) initiated October 2010 with plan to treat ×10 years.    · Genetic testing reportedly negative.    · Developed osteopenia treated with Prolia beginning 2/27/13. Subsequently discontinued due to identification of metastatic disease.  2. Recurrent/metastatic disease identified 10/8/17:  · Disease involving thoracic spine with cord compression at T6, lumbosacral involvement, sternal and right sternoclavicular involvement.    · Femara  discontinued in 10/2017.    · Radiation administered (in the [facility] system) to the thoracic spine beginning 10/19/17 treating T3-T9 to a dose of 24 gray in 6 fractions.  · Evaluation with MRI 12/8/17 showing persistent T6 cord compression with persistent neurologic compromise requiring surgical treatment 12/11/17 with T6 laminectomy/corpectomy and T3-T9 fusion.  Pathology with metastatic carcinoma of breast origin, ER negative, TN negative, HER-2/peter negative (1+ IHC).  · Additional staging evaluation 12/8/17 with no evidence of visceral metastatic disease, bone scan showing involvement of thoracic spine, sternum, left humerus, mid frontal bone.  No plane film correlate of left humerus lesion.  MRI lumbar spine with small intradural L3 metastasis.  CA 15-3 12/6/17- 17.  · Palliative radiation therapy to L3 dural metastasis and left humerus initiated 1/15/18 (10 fractions), completed 1/26/18.  · Hypercalcemia of malignancy with calcium in the 10-11 range.  · Initiation of monthly Xgeva 1/23/18.  · Baseline CT scan 1/30/18 with no evidence of visceral involvement.  Cluster of nodular opacities in the right lower lobe suspected to be infectious or related to bronchiolitis. Bone scan 1/30/18 showed postsurgical change in the thoracic spine, stable uptake in the frontal bone, no new areas of disease.  · Initiation of palliative oral single agent Xeloda 2/7/18 2000 mg a.m., 1500 mg p.m. for 14/21 days.   · Following 3 cycles xeloda, bone scan 4/4/18 showed no change from the prior study.  CT scan 4/4/18 showed a small pericardial effusion of unclear significance as well as a subcutaneous nodule in the right lateral chest wall.  Subsequent evaluation with echocardiogram 4/17/18 showed no evidence of pericardial effusion.  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  We discussed  the possibility of surgical excision of the right subcutaneous chest wall lesion for more definitive diagnosis.  Reviewed previous CT images dating back to 12/8/17 and the lesion was present even at that time measuring around 1.7 cm although not commented on in the radiology report.  As this appears to be an indolent low-grade process unrelated to her breast cancer, recommendee foregoing surgical excision at this time and monitoring this area on future scans.  The patient agreed.    · Following 6 cycles of Xeloda, CT 6/6/18  showed stable findings, no evidence of progressive disease.  There was a comment regarding subcutaneous abnormality in the anterior abdominal wall and this was related to Lovenox injection sites.  Bone scan 6/6/18 showed no interval change.   · CT scan and bone scan 8/13/18 following 9 cycles of Xeloda showed stable findings with no evidence of significant visceral metastases.  Her bone lesions appear stable on bone scan.  The spindle cell neoplasm in her right chest wall actually decreased in size from 2 cm down to 1.6 cm.    · The patient experienced some symptoms of diarrhea, anorexia, generalized weakness during cycle 9 Xeloda it was unclear whether this was related to a viral gastroenteritis or toxicity from treatment.  Symptoms recurred during cycle 10 and treatment was cut short by 2 days.  Symptoms attributed to Xeloda.  With cycle 11, dose and schedule altered to 1500 mg twice daily for 7 days on followed by 7 days off .  · CT scan 9/9/2020 with no significant changes.  Bone scan 9/9/2020 read as unchanged from prior studies however did note an area of slight activity in the medial left femur.  Contacted radiology and although this was not noted on prior reports appears to have been present.  Subsequent MRI left femur 9/21/2020 with cortical thickening and periosteal edema left iliac us muscle insertion to the medial left femur with no evidence of metastatic disease, favored to  represent periosteal change secondary to insertional tendinitis.  · Severe hand-foot symptoms causing sclerodactyly and limitation in finger movement prompted change in dosing in July 2021 with Xeloda decreased to 1000 mg a.m., 1500 mg p.m. for 7 days on followed by 7 days off.  · Patient was experiencing severe hand-foot syndrome related to Xeloda having developed skin peeling, sclerodactyly with limited use of her hands.  On 3/31/2022, Xeloda was held to allow for recovery.  Patient resumed Xeloda on 4/18/2022.  · Patient required hospitalization 5/29 - 6/1/2022 with presumed viral gastroenteritis that was exacerbated by Xeloda.  Xeloda held briefly, resumed on 6/6/2022.  · Patient again with worsening sclerodactyly, Xeloda held for 2 weeks from 10/3 - 10/17/2022, resumed at prior dose with improvement in symptoms.  · Most recent scans from 10/24/2022 with CT chest abdomen pelvis that showed stable findings including left supraclavicular lymph node, right lower lobe pulmonary nodules, bony lesions.  Bone scan with stable findings.  Continue current plan of care  · The patient returns today continuing on treatment with Xeloda 1000 mg a.m., 1500 mg p.m. 7 days on followed by 7 days off (due to resume Xeloda today).  She also continues on monthly Xgeva, due today.  She will return in 4 weeks for NP visit (1 day delay due to the Christmas holiday) on 12/27/2022 for NP visit and Xgeva.  She will then see Dr. Hancock back shortly thereafter on 1/9/2022 with labs to review at which time we will discuss timeframe for follow-up scans.  3. Right pulmonary embolism:  · Diagnosed on CT angiogram 10/21/17 involving small right lower lobe pulmonary artery.  Lower extremity Dopplers negative.  · Bilateral lower extremity Dopplers negative again 12/5/17.  · Received chronic Lovenox 1 mg/kg twice per day, transition to oral Eliquis in February 2019, continuing on Eliquis 5 mg twice daily.  · Patient continues on anticoagulation  without any complications.  4. Cancer related pain:  · Previously receiving Duragesic 50 µg patch every 72 hours along with Dilaudid 4 mg as needed for breakthrough pain  · The patient's pain improved over time and she was able to discontinue both Duragesic and Dilaudid in the interval.  · Patient does take occasional Flexeril at bedtime due to back spasm/pain when she has been more active.  · The patient does have some occasional aggravation of her chronic back pain and does use tramadol 50 mg every 8 hours as needed  · Patient's pain is stable.  She does continue to use tramadol 50 mg every 8-12 hours as needed which has been effective.  The patient does have chronic difficulty with pain involving her low back, left shoulder.  Patient notes that her pain does wax and wane.  Physical therapy did help.  5. Chemotherapy-induced diarrhea with subsequent C. difficile colitis in the setting of previous ulcerative colitis and then identification of enteropathogenic E. coli:  · Patient with reported history of ulcerative colitis, continues on mesalamine.  · The patient developed initial diarrhea related to Xeloda at regular dosing.  · Symptoms improved on reduced dose Xeloda  · Flare of symptoms in October 2018 with apparent finding of C. difficile colitis by GI, treated with course of oral vancomycin with improvement in symptoms.  · Patient notes minimal intermittent diarrhea/loose stools on Xeloda requiring occasional dosing of Imodium.    · Patient required hospitalization 5/29 - 6/1/2022 with presumed viral gastroenteritis that was exacerbated by Xeloda.  Xeloda held briefly, resumed on 6/6/2022.  · Patient's  developed C. difficile colitis and patient was experiencing increase in diarrhea.  Stool studies performed 8/4/22 negative C. difficile however GI PCR was positive for enteropathogenic E. coli.  Given patient's symptoms and immunocompromise status it was elected to treat her with azithromycin 500 mg daily  x3 days beginning 8/5/2022  · Patient reports that diarrhea resolved.  6. Traumatic left tibia/fibular fracture:  · Status post ORIF 12/6/17  · Specimen was sent for pathologic review, negative for evidence of malignancy  7. Hypercalcemia:  · Suspect hypercalcemia of malignancy, calcium in  10-11 range previously.  · Calcium normalized following initiation of monthly Xgeva on 1/23/18.  8. Chemotherapy-induced mucositis:  · Patient had a minimal degree of mucositis with cycle 2.  The patient has magic mouthwash to use as needed.  No subsequent mucositis.  9. Recurrent UTI, bladder wall thickening on CT:  · Patient had an enterococcal UTI on 3/2/18 sensitive to nitrofurantoin and received treatment, unclear how long.  · Recurrent UTI 3/20/18 with urine culture growing Klebsiella, initially treated with nitrofurantoin, transitioned to Levaquin.  · CT 4/4/18 with diffuse bladder wall thickening with increased nodular thickening at the left base.  Referral to urogynecology Dr. May Johnson.  She was placed on a prophylactic dose of trimethoprim 100 mg daily, bladder wall thickening felt to be related to recent recurrent urinary tract infections.  · Patient with urinary symptoms, treated with course of Macrobid at the end of December 2018, urine culture however was negative 12/31/18.  · Patient was found to have Klebsiella UTI 7/29/2019 which was successfully treated with Macrobid with complete resolution of symptoms.  · CT 8/10/2021 with diffuse bladder wall thickening new from 5/17/2021 (however seen on multiple prior scans).    · UTI on 10/18/2021 with E. coli greater than 100,000 colonies that was pansensitive, treated with Macrobid x7 days  · With ongoing/recurrent UTIs patient was seen by urogynecology and initiated suppressive therapy with trimethoprim 100 mg daily in December 2021.  She has not experienced any further urinary tract infections.  · CT abdomen pelvis 3/28/2022 does show diffuse thickening of urinary  bladder as has been seen on prior studies indicative of cystitis.  · Patient notes that she did stop taking trimethoprim on a daily basis.  She did follow-up recently with urogynecology, aware that she is off of suppressive antibiotics.  Fortunately however she has not experienced any recurrent UTIs recently.  10.   Mobility:  · The patient underwent an intensive course of rehabilitation at [facility].  She graduated from her outpatient course November 2018.  · Overall the patient has improved dramatically in terms of mobility,   · Patient has been continuing to walk for exercise on a regular basis.  11.  Depression:  · The patient is continuing follow-up in the supportive oncology clinic and is currently continuing on Cymbalta 30 mg twice daily as well as gabapentin 300 mg at dinner and 300 mg nightly, temazepam 15 mg nightly as needed, Provigil 100 mg daily.  · Patient does continue to attend support groups at [employer].  · The patient does continue routine follow-up in supportive oncology clinic.    · Patient does have multiple stressors with her 's malignancy and chemotherapy as well as her autistic son who is living with them at home.  · Patient last seen by supportive oncology 8/5/2022  · Patient today is somewhat tearful in regards to her 's medical situation.  12. 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  Symptoms did improve and she continued on the same dose.    · Patient was referred to dermatology and has been continuing on triamcinolone 0.1% cream used for 1 week on followed by 1 week off which led to some further improvement.   · Progressive palmar erythema with development of sclerodactyly and effect on dexterity.  Addition of urea-based cream 3 times daily.  · Patient with continued difficulty regarding  erythema of her hands that extended onto the dorsal aspect and was causing contractures/sclerodactyly in her fingers affecting her dexterity.  In July 2021, held Xeloda for an additional week and then reduced dose from 1500 mg twice daily down to 1000 mg a.m. and 1500 mg p.m. and continued on a 7-day on followed by 7-day off schedule.  · With reduction in Xeloda dose, slight improvement in erythema involving dorsal aspect of the hands and slight decrease in sclerodactyly  · Patient was experiencing severe hand-foot syndrome related to Xeloda having developed skin peeling, sclerodactyly with limited use of her hands.  On 3/31/2022, Xeloda was held to allow for recovery.  Patient resumed Xeloda on 4/18/2022.  · Patient developed worsening sclerodactyly affecting dexterity that required Xeloda to again be briefly held for 2 weeks from 10/3 - 10/17/2022.  Treatment resumed at prior dose after improvement in symptoms.  · Patient continues to use emollient cream frequently (currently 5 times daily) and topical triamcinolone 0.1% twice daily intermittently (2 weeks on followed by 2 weeks off as instructed by dermatology).  The degree of sclerodactyly has improved after her 2-week break from treatment.  It does persist but is not affecting her dexterity significantly at this time.  She does have ongoing erythema that extends into the dorsal aspect of the hands but is fairly stable and mild, slight skin scaling noted currently.  13. Evidence of steatosis on scans with mild intermittent elevated liver function studies:  · Liver function studies increased 8/20/19 with ALT 98, AST 70, normal total bilirubin.  · Negative viral hepatitis A, B, and C panel 8/23/18  · Likely related to hepatic steatosis.   · Subsequent improvement in LFTs  · LFTs today normal.  14. Chemotherapy induced leukopenia:  · WBC today 5.02  15. GERD, question of celiac disease:  · Patient with significant history of reflux  · Patient currently receiving  Protonix 40 mg daily daily and Carafate 1 g 4 times daily as needed  · Patient required hospitalization 5/29 - 6/1/2022 with presumed viral gastroenteritis that was exacerbated by Xeloda.    · EGD performed 5/31/2022 during hospitalization with biopsy that showed focal blunting of villi and atrophy with slight increase in intraepithelial lymphocytes.  Changes were minimal but recommended correlation with serology to exclude celiac disease.  · Celiac serology 8/8/2022 negative  · Patient previously developed worsening reflux symptoms, temporarily increase Protonix to 40 mg twice daily and we did add Carafate 1 g 4 times daily.    · Today she reports improved symptoms.  She is taking Protonix 40 mg once daily, is not taking Carafate.  16. Insomnia:  · Patient with prior paradoxical reaction to Benadryl  · Improved previously on temazepam 15 mg nightly as needed.   · Patient noted subsequently that temazepam was having no effect.   · Symptoms currently stable on gabapentin 300 mg at dinner and 300 mg nightly as well as temazepam 15 mg nightly as needed  17. Health maintenance:  · Patient notes that she has a history of colon polyps as well as ulcerative colitis and was due for a follow-up colonoscopy on 9/12/2019.  We did discuss there is no necessity to pursue colonoscopy in the setting of her metastatic breast cancer.    · The patient did undergo colonoscopy on 2/7/2020 with findings of muscular hypertrophy and diverticulosis.   · See above discussion, note patient plans to proceed with colonoscopy, currently scheduled for 12/20/2022  18. Bilateral shoulder pain:  · Patient was evaluated by Dr Forrester.  She has experienced difficulty over a long time frame with right shoulder although she has not complained of this in prior visits to our office.  She reported difficulty with abduction.    · The patient did undergo MRI of her right shoulder on 11/21/2019 at an outside facility showing multiple abnormalities  including supraspinatus tendinosis, labral tear but no evidence of metastatic disease.  · Patient developed pain in the left shoulder, was seen by orthopedics and underwent steroid injection with some improvement.  Right shoulder stable.  Patient did undergo physical therapy with some improvement.  She continues to use tramadol 50 mg every 8-12 hours as needed.  · Note that current CT 10/20/2022 made notation of left shoulder probable bursitis.  We discussed that she should return to see orthopedics and discuss further steroid injection.  19. Ocular changes in part related to Xeloda:  · Patient experienced a mild degree of blurred vision as well as burning and pruritus.  · She was seen by her ophthalmologist and was placed on xiidra ophthalmic drops which have helped.  · Likely both issues are to some extent related to Xeloda.  · Symptoms did worsen and patient was seen by a new ophthalmologist at [facility].  She is now using an ocular lubricant at night in addition to artificial tears which have helped.  20. Elevated glucose:  · It is noted the patient's glucose at the last few visits has been in the high 100 range, postprandial.  · She has had a hemoglobin A1c that has been in the high 5-6 range in the past, on 5/1/2019 at 5.7  · Hemoglobin A1c was last checked in 11/12/2021 at 5.8  21. Possible loosening of pedicle screw at T3:  · CT cervical spine 5/17/2021 showed loosening of the left pedicle screw at T3.  Patient referred to orthopedics and was seen by Dr. Nayak who felt that there were no concerning findings on the scan  22. COVID-19 vaccination  · Patient received the Pfizer vaccine, second dose administered on 4/2/2021 without side effects.  · Patient received her third dose Pfizer COVID-19 injection in August 2021  23. Iron deficiency anemia  · Labs on 5/2/2022 with hemoglobin 10.8.  Additional labs with iron 48, ferritin 21.2, iron saturation 11%, TIBC 4 and 32, folate greater than 20, B12  greater than 2000.    · Attempted treatment with oral iron daily however patient was intolerant  · Patient subsequently received Venofer 300 mg weekly x4 beginning 6/6/2022 and completed on 6/27/2022  · Subsequent iron studies on 7/11/2022 showed improvement with iron 62, ferritin 345, iron saturation 18%, TIBC 336.  Hemoglobin had improved up to 12.7 at that time.  · Repeat iron studies on 10/3/2022 showed iron replete status with hemoglobin 13.7, iron 121, ferritin 208.7, iron saturation 31%, TIBC 392.  · Hemoglobin currently normal at 13.4     Plan:  1. Continue palliative single agent Xeloda 1000 mg a.m., 1500 mg in the p.m. 7 days on followed by 7 days off (patient initiating a new week of Xeloda today)  2. Proceed with monthly Xgeva today  3. Continue Eliquis 5 mg twice daily  4. The patient will continue frequent use of emollient cream currently 5 times daily and add continue periodic triamcinolone cream 0.1% twice daily (provided by dermatology) 2 weeks on followed by 2 weeks off as prescribed (asked patient to resume this today)  5. Patient will continue Protonix 40 mg daily  6. Continue ocular lubricating gel nightly per ophthalmology  7. Continue Provigil 100 mg daily and Cymbalta 30 mg twice daily, gabapentin 300 mg at dinner and 300 mg at night per supportive oncology clinic.  Patient continues routine follow-up with supportive oncology   8. Continue temazepam 15 mg nightly.  9. In 4 weeks NP visit (delayed to Tuesday, 12/27/2022 due to Christmas holiday) with CBC, CMP, magnesium, phosphorus and Xgeva  10. See Dr. Hancock visit on 1/9/2022 with CBC, CMP.  We will assess her clinical status and discuss timeframe for follow-up scans either at a 3 or possibly 4-month interval.     Patient continuing on high risk medication requiring intensive monitoring.

## 2022-11-28 ENCOUNTER — APPOINTMENT (OUTPATIENT)
Dept: ONCOLOGY | Facility: HOSPITAL | Age: 62
End: 2022-11-28

## 2022-11-28 ENCOUNTER — APPOINTMENT (OUTPATIENT)
Dept: OTHER | Facility: HOSPITAL | Age: 62
End: 2022-11-28

## 2022-11-28 ENCOUNTER — LAB (OUTPATIENT)
Dept: OTHER | Facility: HOSPITAL | Age: 62
End: 2022-11-28

## 2022-11-28 ENCOUNTER — INFUSION (OUTPATIENT)
Dept: ONCOLOGY | Facility: HOSPITAL | Age: 62
End: 2022-11-28

## 2022-11-28 ENCOUNTER — OFFICE VISIT (OUTPATIENT)
Dept: ONCOLOGY | Facility: CLINIC | Age: 62
End: 2022-11-28

## 2022-11-28 VITALS
DIASTOLIC BLOOD PRESSURE: 79 MMHG | HEIGHT: 62 IN | SYSTOLIC BLOOD PRESSURE: 149 MMHG | TEMPERATURE: 98 F | RESPIRATION RATE: 18 BRPM | WEIGHT: 186.9 LBS | BODY MASS INDEX: 34.39 KG/M2 | OXYGEN SATURATION: 96 % | HEART RATE: 98 BPM

## 2022-11-28 DIAGNOSIS — Z17.1 MALIGNANT NEOPLASM OF OVERLAPPING SITES OF RIGHT BREAST IN FEMALE, ESTROGEN RECEPTOR NEGATIVE: Primary | ICD-10-CM

## 2022-11-28 DIAGNOSIS — C50.811 MALIGNANT NEOPLASM OF OVERLAPPING SITES OF RIGHT BREAST IN FEMALE, ESTROGEN RECEPTOR NEGATIVE: ICD-10-CM

## 2022-11-28 DIAGNOSIS — C50.919 METASTATIC BREAST CANCER: ICD-10-CM

## 2022-11-28 DIAGNOSIS — C50.811 MALIGNANT NEOPLASM OF OVERLAPPING SITES OF RIGHT BREAST IN FEMALE, ESTROGEN RECEPTOR NEGATIVE: Primary | ICD-10-CM

## 2022-11-28 DIAGNOSIS — Z79.899 HIGH RISK MEDICATION USE: ICD-10-CM

## 2022-11-28 DIAGNOSIS — Z17.1 MALIGNANT NEOPLASM OF OVERLAPPING SITES OF RIGHT BREAST IN FEMALE, ESTROGEN RECEPTOR NEGATIVE: ICD-10-CM

## 2022-11-28 DIAGNOSIS — C79.51 BONE METASTASES: ICD-10-CM

## 2022-11-28 LAB
ALBUMIN SERPL-MCNC: 4.2 G/DL (ref 3.5–5.2)
ALBUMIN/GLOB SERPL: 1.5 G/DL
ALP SERPL-CCNC: 63 U/L (ref 39–117)
ALT SERPL W P-5'-P-CCNC: 16 U/L (ref 1–33)
ANION GAP SERPL CALCULATED.3IONS-SCNC: 8.1 MMOL/L (ref 5–15)
AST SERPL-CCNC: 23 U/L (ref 1–32)
BASOPHILS # BLD AUTO: 0.03 10*3/MM3 (ref 0–0.2)
BASOPHILS NFR BLD AUTO: 0.6 % (ref 0–1.5)
BILIRUB SERPL-MCNC: 0.4 MG/DL (ref 0–1.2)
BUN SERPL-MCNC: 24 MG/DL (ref 8–23)
BUN/CREAT SERPL: 29.3 (ref 7–25)
CALCIUM SPEC-SCNC: 9.9 MG/DL (ref 8.6–10.5)
CHLORIDE SERPL-SCNC: 102 MMOL/L (ref 98–107)
CO2 SERPL-SCNC: 30.9 MMOL/L (ref 22–29)
CREAT SERPL-MCNC: 0.82 MG/DL (ref 0.57–1)
DEPRECATED RDW RBC AUTO: 53.6 FL (ref 37–54)
EGFRCR SERPLBLD CKD-EPI 2021: 81.5 ML/MIN/1.73
EOSINOPHIL # BLD AUTO: 0.16 10*3/MM3 (ref 0–0.4)
EOSINOPHIL NFR BLD AUTO: 3.2 % (ref 0.3–6.2)
ERYTHROCYTE [DISTWIDTH] IN BLOOD BY AUTOMATED COUNT: 14.8 % (ref 12.3–15.4)
GLOBULIN UR ELPH-MCNC: 2.8 GM/DL
GLUCOSE SERPL-MCNC: 94 MG/DL (ref 65–99)
HCT VFR BLD AUTO: 40.9 % (ref 34–46.6)
HGB BLD-MCNC: 13.4 G/DL (ref 12–15.9)
IMM GRANULOCYTES # BLD AUTO: 0.01 10*3/MM3 (ref 0–0.05)
IMM GRANULOCYTES NFR BLD AUTO: 0.2 % (ref 0–0.5)
LYMPHOCYTES # BLD AUTO: 1.05 10*3/MM3 (ref 0.7–3.1)
LYMPHOCYTES NFR BLD AUTO: 20.9 % (ref 19.6–45.3)
MAGNESIUM SERPL-MCNC: 1.9 MG/DL (ref 1.6–2.4)
MCH RBC QN AUTO: 32.8 PG (ref 26.6–33)
MCHC RBC AUTO-ENTMCNC: 32.8 G/DL (ref 31.5–35.7)
MCV RBC AUTO: 100 FL (ref 79–97)
MONOCYTES # BLD AUTO: 0.39 10*3/MM3 (ref 0.1–0.9)
MONOCYTES NFR BLD AUTO: 7.8 % (ref 5–12)
NEUTROPHILS NFR BLD AUTO: 3.38 10*3/MM3 (ref 1.7–7)
NEUTROPHILS NFR BLD AUTO: 67.3 % (ref 42.7–76)
NRBC BLD AUTO-RTO: 0 /100 WBC (ref 0–0.2)
PHOSPHATE SERPL-MCNC: 3.3 MG/DL (ref 2.5–4.5)
PLATELET # BLD AUTO: 262 10*3/MM3 (ref 140–450)
PMV BLD AUTO: 10.5 FL (ref 6–12)
POTASSIUM SERPL-SCNC: 4.9 MMOL/L (ref 3.5–5.2)
PROT SERPL-MCNC: 7 G/DL (ref 6–8.5)
RBC # BLD AUTO: 4.09 10*6/MM3 (ref 3.77–5.28)
SODIUM SERPL-SCNC: 141 MMOL/L (ref 136–145)
WBC NRBC COR # BLD: 5.02 10*3/MM3 (ref 3.4–10.8)

## 2022-11-28 PROCEDURE — 84100 ASSAY OF PHOSPHORUS: CPT | Performed by: INTERNAL MEDICINE

## 2022-11-28 PROCEDURE — 36415 COLL VENOUS BLD VENIPUNCTURE: CPT

## 2022-11-28 PROCEDURE — 96372 THER/PROPH/DIAG INJ SC/IM: CPT

## 2022-11-28 PROCEDURE — 25010000002 DENOSUMAB 120 MG/1.7ML SOLUTION: Performed by: NURSE PRACTITIONER

## 2022-11-28 PROCEDURE — 83735 ASSAY OF MAGNESIUM: CPT | Performed by: INTERNAL MEDICINE

## 2022-11-28 PROCEDURE — 85025 COMPLETE CBC W/AUTO DIFF WBC: CPT | Performed by: INTERNAL MEDICINE

## 2022-11-28 PROCEDURE — 80053 COMPREHEN METABOLIC PANEL: CPT | Performed by: INTERNAL MEDICINE

## 2022-11-28 PROCEDURE — 99214 OFFICE O/P EST MOD 30 MIN: CPT | Performed by: NURSE PRACTITIONER

## 2022-11-28 RX ADMIN — DENOSUMAB 120 MG: 120 INJECTION SUBCUTANEOUS at 10:27

## 2022-11-29 ENCOUNTER — SPECIALTY PHARMACY (OUTPATIENT)
Dept: PHARMACY | Facility: HOSPITAL | Age: 62
End: 2022-11-29

## 2022-11-29 NOTE — PROGRESS NOTES
MTM Encounter-Re: Adherence and side effects (Xeloda)    Today's encounter was conducted via telephone.    Medication:  Xeloda 1000 mg PO AM, 1500 mg PO PM x 7 days, then 7 days off   - Reason for outreach: Routine medication check-in .  - Administration: Patient is taking every day at the same time .  - Missed doses: Patient reports missing 1.5 doses in the last 30 days.  - Self-administration: Patient demonstrates ability to self-administer medication. No barriers to adherence identified.   - Diagnosis/Indication: HR (+), HER2 (-) breast cancer. Progress toward achieving therapeutic goals reviewed.   - Patient denies side effects.    - Medication availability/affordability: Patient has had no issues obtaining medication from pharmacy.   - Questions/concerns about medications: none        Completed medication reconciliation today to assess for drug interactions.   Reviewed allergies, medical history, labs, quality of life, and medication history with patient.   Patient denies starting or stopping any medications.  Assessed medication list for interactions, no significant drug interactions noted.   Advised pt to call the clinic if any new medications are started so we can assess for drug-drug interactions.     All questions addressed. Patient had no additional concerns for MTM office.     Raine Baptiste, Pharmacy Intern  11/29/2022  15:26 EST

## 2022-12-01 ENCOUNTER — SPECIALTY PHARMACY (OUTPATIENT)
Dept: PHARMACY | Facility: HOSPITAL | Age: 62
End: 2022-12-01

## 2022-12-02 ENCOUNTER — SPECIALTY PHARMACY (OUTPATIENT)
Dept: PHARMACY | Facility: HOSPITAL | Age: 62
End: 2022-12-02

## 2022-12-02 RX ORDER — CAPECITABINE 500 MG/1
TABLET, FILM COATED ORAL
Qty: 70 TABLET | Refills: 5 | Status: SHIPPED | OUTPATIENT
Start: 2022-12-02

## 2022-12-02 NOTE — TELEPHONE ENCOUNTER
Refill requested from pharmacy. Per last chart note, patient to continue Xeloda 1000 mg a.m., 1500 mg p.m. 7 days on followed by 7 days off . Will route to MD for cosignature.    Gage Gonsales, PharmD, [facility]  12/2/2022 12:22 EST

## 2022-12-02 NOTE — PROGRESS NOTES
Specialty Pharmacy Refill Coordination Note     Martha is a 62 y.o. female contacted today regarding refills of Capecitabine specialty medication(s).    Reviewed and verified with patient:       Specialty medication(s) and dose(s) confirmed: yes  Capecitabine 500 mg tabs-2 tabs (1000 mg) in the AM and 3 tabs (1500 mg) in the PM for 7 days on then 7 days off.    Refill Questions    Flowsheet Row Most Recent Value   Changes to allergies? No   Changes to medications? No   New conditions since last clinic visit No   Unplanned office visit, urgent care, ED, or hospital admission in the last 4 weeks  No   How does patient/caregiver feel medication is working? Good   Financial problems or insurance changes  No   Since the previous refill, were any specialty medication doses or scheduled injections missed or delayed?  Yes   If yes, please provide the amount 1 day   Why were doses missed? Due to the holiday-got busy and forgot   Does this patient require a clinical escalation to a pharmacist? No          Delivery Questions    Flowsheet Row Most Recent Value   Delivery method Other (Comment)  [[employer] to home address 12/5-$0 copay-Address Confirmed]   Delivery address correct? Yes  [Ship to home address]   Delivery phone number [phone]   Preferred delivery time? AM   Number of medications in delivery 1   Medication being filled and delivered Capecitabine   Doses left of specialty medications 13 tablets (she takes 5 per day for 7 days-this is a 2.5 day supply) Cycle starts 12/5/22   Is there any medication that is due not being filled? No   Supplies needed? No supplies needed   Cooler needed? No   Do any medications need mixed or dated? No   Copay form of payment Payment plan already set up   Additional comments $0 copay with Medicare Part B   Questions or concerns for the pharmacist? No   Explain any questions or concerns for the pharmacist N/A   Are any medications first time fills? No        Capecitabine delivery  coordinated with pt for 12/5/22 via [employer] to her home address. Her cycle will start on 12/5/22 and she does have a small supply for her to start on time. She states her cycles got off track due to being held at one time. $0 copay with Medicare Part B. Her last delivery was on 11/10/22. No questions or concerns to report to  MTM Team today.     Follow-up: 21 day(s)     Dara Green  Specialty Pharmacy Technician

## 2022-12-05 RX ORDER — TRAMADOL HYDROCHLORIDE 50 MG/1
TABLET ORAL
Qty: 90 TABLET | Refills: 0 | Status: SHIPPED | OUTPATIENT
Start: 2022-12-05 | End: 2023-01-19

## 2022-12-07 ENCOUNTER — TELEMEDICINE (OUTPATIENT)
Dept: PSYCHIATRY | Facility: CLINIC | Age: 62
End: 2022-12-07

## 2022-12-07 DIAGNOSIS — F41.1 GENERALIZED ANXIETY DISORDER: Primary | ICD-10-CM

## 2022-12-07 DIAGNOSIS — I10 HYPERTENSION, UNSPECIFIED TYPE: ICD-10-CM

## 2022-12-07 DIAGNOSIS — E78.5 HYPERLIPIDEMIA, UNSPECIFIED HYPERLIPIDEMIA TYPE: Primary | ICD-10-CM

## 2022-12-07 PROCEDURE — 90832 PSYTX W PT 30 MINUTES: CPT | Performed by: COUNSELOR

## 2022-12-14 LAB
ALBUMIN SERPL-MCNC: 4.7 G/DL (ref 3.5–5.2)
ALBUMIN/GLOB SERPL: 2.4 G/DL
ALP SERPL-CCNC: 59 U/L (ref 39–117)
ALT SERPL-CCNC: 14 U/L (ref 1–33)
AST SERPL-CCNC: 14 U/L (ref 1–32)
BILIRUB SERPL-MCNC: 0.4 MG/DL (ref 0–1.2)
BUN SERPL-MCNC: 27 MG/DL (ref 8–23)
BUN/CREAT SERPL: 29.3 (ref 7–25)
CALCIUM SERPL-MCNC: 9.8 MG/DL (ref 8.6–10.5)
CHLORIDE SERPL-SCNC: 101 MMOL/L (ref 98–107)
CHOLEST SERPL-MCNC: 243 MG/DL (ref 0–200)
CO2 SERPL-SCNC: 35 MMOL/L (ref 22–29)
CREAT SERPL-MCNC: 0.92 MG/DL (ref 0.57–1)
EGFRCR SERPLBLD CKD-EPI 2021: 70.5 ML/MIN/1.73
GLOBULIN SER CALC-MCNC: 2 GM/DL
GLUCOSE SERPL-MCNC: 97 MG/DL (ref 65–99)
HDLC SERPL-MCNC: 79 MG/DL (ref 40–60)
LDLC SERPL CALC-MCNC: 131 MG/DL (ref 0–100)
POTASSIUM SERPL-SCNC: 4.9 MMOL/L (ref 3.5–5.2)
PROT SERPL-MCNC: 6.7 G/DL (ref 6–8.5)
SODIUM SERPL-SCNC: 143 MMOL/L (ref 136–145)
TRIGL SERPL-MCNC: 193 MG/DL (ref 0–150)
VLDLC SERPL CALC-MCNC: 33 MG/DL (ref 5–40)

## 2022-12-14 RX ORDER — MESALAMINE 1.2 G/1
TABLET, DELAYED RELEASE ORAL
Qty: 180 TABLET | Refills: 3 | Status: SHIPPED | OUTPATIENT
Start: 2022-12-14

## 2022-12-15 ENCOUNTER — TELEPHONE (OUTPATIENT)
Dept: GASTROENTEROLOGY | Facility: CLINIC | Age: 62
End: 2022-12-15

## 2022-12-15 NOTE — PROGRESS NOTES
Date: December 15, 2022  Time In: 1400  Time Out: 1440  This provider is located at the [facility] ([facility]), [address], [place] [address] using a secure [employer] Video Visit through [employer]. Patient is being seen remotely via telehealth at home address in [place] and stated they are in a secure environment for this session. The patient's condition being diagnosed/treated is appropriate for telemedicine. The provider identified herself as well as her credentials. The patient, and/or patients guardian, consent to be seen remotely, and when consent is given they understand that the consent allows for patient identifiable information to be sent to a third party as needed. They may refuse to be seen remotely at any time. The electronic data is encrypted and password protected, and the patient and/or guardian has been advised of the potential risks to privacy not withstanding such measures.Due to connection issues and multiple failed attempts to connect this session was completed via telephone.      You have chosen to receive care through a telehealth visit.  Do you consent to use a video/audio connection for your medical care today? Yes    PROGRESS NOTE  Data:  Martha Davey is a 62 y.o. female who presents today for follow up    Chief Complaint: anxiety     History of Present Illness: Pt reports that she has been anxious lately. Pt discussed 's health and how he is a different person than what he was a few years ago and is trying to adjust to 's codependency. Pt reports that she enjoys time to herself and that this is hard to achieve at times. Pt explained that grocery shopping has even became a stressor due to 's need to go now and will take items in and out of the cart which is frustrating for Pt. Pt reports that she is trying adjust to these new behaviors but is difficult at times and is unsure how to effectively adjust.        Clinical Maneuvering/Intervention:    (Scales based on 0 - 10 with 10 being the worst)  Depression: 4 Anxiety: 6     FLACO-7      PHQ-9 Total Score:       Assisted patient in processing above session content; acknowledged and normalized patient’s thoughts, feelings, and concerns.  Rationalized patient thought process regarding adjusting.  Discussed triggers associated with patient's anxiety.  Also discussed coping skills for patient to implement such as adapting new methods for old tasks; encouraged Pt to ask  to form a grocery list and for Pt to attend store alone to obtain items on the list.    Allowed patient to freely discuss issues without interruption or judgment. Provided safe, confidential environment to facilitate the development of positive therapeutic relationship and encourage open, honest communication. Assisted patient in identifying risk factors which would indicate the need for higher level of care including thoughts to harm self or others and/or self-harming behavior and encouraged patient to contact this office, call 911, or present to the nearest emergency room should any of these events occur. Discussed crisis intervention services and means to access. Patient adamantly and convincingly denies current suicidal or homicidal ideation or perceptual disturbance.    Assessment:   Assessment   Patient appears to maintain relative stability as compared to their baseline.  However, patient continues to struggle with anxiety which continues to cause impairment in important areas of functioning.  A result, they can be reasonably expected to continue to benefit from treatment and would likely be at increased risk for decompensation otherwise.    Mental Status Exam:   Hygiene:   good  Cooperation:  Cooperative  Eye Contact:  Good  Psychomotor Behavior:  Appropriate  Affect:  Appropriate  Mood: normal  Speech:  Normal  Thought Process:  Linear  Thought Content:  Normal  Suicidal:  None  Homicidal:   None  Hallucinations:  None  Delusion:  None  Memory:  Intact  Orientation:  Person, Place, Time and Situation  Reliability:  fair  Insight:  Fair  Judgement:  Fair  Impulse Control:  Fair  Physical/Medical Issues:  No        Patient's Support Network Includes:      Functional Status: Mild impairment     Progress toward goal: Not at goal    Prognosis: Fair with Ongoing Treatment          Plan:    Patient will continue in individual outpatient therapy with focus on improved functioning and coping skills, maintaining stability, and avoiding decompensation and the need for higher level of care.    Patient will adhere to medication regimen as prescribed and report any side effects. Patient will contact this office, call 911 or present to the nearest emergency room should suicidal or homicidal ideations occur. Provide Cognitive Behavioral Therapy and Solution Focused Therapy to improve functioning, maintain stability, and avoid decompensation and the need for higher level of care.     Return in about 4 weeks, or earlier if symptoms worsen or fail to improve.           VISIT DIAGNOSIS:     ICD-10-CM ICD-9-CM   1. Generalized anxiety disorder  F41.1 300.02        Diagnoses and all orders for this visit:    1. Generalized anxiety disorder (Primary)           [facility] No Show Policy:  We understand unexpected circumstances arise; however, anytime you miss your appointment we are unable to provide you appropriate care.  In addition, each appointment missed could have been used to provide care for others.  We ask that you call at least 24 hours in advance to cancel or reschedule an appointment.  We would like to take this opportunity to remind you of our policy stating patients who miss THREE or more appointments without cancelling or rescheduling 24 hours in advance of the appointment may be subject to cancellation of any further visits with our clinic and recommendation to seek in-person  services/visits.    Please call [phone] to reschedule your appointment. If there are reasons that make it difficult for you to keep the appointments, please call and let us know how we can help.  Please understand that medication prescribing will not continue without seeing your provider.      [facility]'s No Show Policy reviewed with patient at today's visit. Patient verbalized understanding of this policy. Discussed with patient that in the event that there are three or more no show visits, it will be recommended that they pursue in-person services/visits as noncompliance with telehealth visits indicates that patient is not an appropriate candidate for telemedicine and would likely be more appropriate for in-person services/visits. Patient verbalizes understanding and is agreeable to this.        This document has been electronically signed by Sophia Barron LCSW  December 15, 2022 13:47 EST      Part of this note may be an electronic transcription/translation of spoken language to printed text using the Dragon Dictation System.

## 2022-12-15 NOTE — TELEPHONE ENCOUNTER
----- Message from Martha Davey sent at 12/15/2022 12:36 PM EST -----  Regarding: Colonoscopy on Dec 20th!  Contact: [phone]  I have misplaced the instruction on the prep for my up coming colonscopy scheduled for next Tuesday.  Can you send me a message of my prep instructions on my chart. Or.  I can come in tomorrow an pick those colonscopy instructions at the [place] office.  Plus. if you can give me my arrival time for next Tuesday arrival time.    Thanks,  Martha Davey  [phone]

## 2022-12-19 ENCOUNTER — OFFICE VISIT (OUTPATIENT)
Dept: INTERNAL MEDICINE | Facility: CLINIC | Age: 62
End: 2022-12-19

## 2022-12-19 ENCOUNTER — SPECIALTY PHARMACY (OUTPATIENT)
Dept: PHARMACY | Facility: HOSPITAL | Age: 62
End: 2022-12-19

## 2022-12-19 VITALS
HEIGHT: 62 IN | BODY MASS INDEX: 33.68 KG/M2 | WEIGHT: 183 LBS | HEART RATE: 99 BPM | SYSTOLIC BLOOD PRESSURE: 109 MMHG | DIASTOLIC BLOOD PRESSURE: 60 MMHG

## 2022-12-19 DIAGNOSIS — I10 HYPERTENSION, UNSPECIFIED TYPE: Primary | ICD-10-CM

## 2022-12-19 DIAGNOSIS — C50.811 MALIGNANT NEOPLASM OF OVERLAPPING SITES OF RIGHT BREAST IN FEMALE, ESTROGEN RECEPTOR NEGATIVE: ICD-10-CM

## 2022-12-19 DIAGNOSIS — M19.011 OSTEOARTHRITIS OF BOTH SHOULDERS, UNSPECIFIED OSTEOARTHRITIS TYPE: ICD-10-CM

## 2022-12-19 DIAGNOSIS — M19.012 OSTEOARTHRITIS OF BOTH SHOULDERS, UNSPECIFIED OSTEOARTHRITIS TYPE: ICD-10-CM

## 2022-12-19 DIAGNOSIS — Z17.1 MALIGNANT NEOPLASM OF OVERLAPPING SITES OF RIGHT BREAST IN FEMALE, ESTROGEN RECEPTOR NEGATIVE: ICD-10-CM

## 2022-12-19 DIAGNOSIS — Z00.00 HEALTHCARE MAINTENANCE: ICD-10-CM

## 2022-12-19 DIAGNOSIS — E78.5 HYPERLIPIDEMIA, UNSPECIFIED HYPERLIPIDEMIA TYPE: ICD-10-CM

## 2022-12-19 PROCEDURE — 99214 OFFICE O/P EST MOD 30 MIN: CPT | Performed by: INTERNAL MEDICINE

## 2022-12-19 RX ORDER — ROSUVASTATIN CALCIUM 5 MG/1
5 TABLET, COATED ORAL DAILY
Qty: 90 TABLET | Refills: 1 | Status: SHIPPED | OUTPATIENT
Start: 2022-12-19

## 2022-12-19 NOTE — PROGRESS NOTES
"Chief Complaint   Patient presents with   • Hypertension   • Hyperlipidemia   • neuromuscular d/o charcot ambika tooth   • Shoulder Pain       History of Present Illness   Martha Davey is a 62 y.o. female presents for routine follow up evaluation. Patient w/ several chronic issues and acute needs. Patient has CMT disease. Braces for both lower extremity. She does have area of irritation/ compression of the left foot. She is to get new braces on wed this week.   C/o B shoulders L>R. Had injections B but only improvement in right with this. She has pain all day. Tramadol w/ 6-7 hours of relief when she takes this. However, she is only taking the med twice a daily. Has htn. bp has been normotensive. She has mets breast ca to back. Imaging stable in October. She does note intermittent mid thoracic pain but \"it's not all the time\".   Patient has anxiety. She is meeting w/ therapist routinely.   cmp and lipid reviewed. Cholesterol is elevated.             The following portions of the patient's history were reviewed and updated as appropriate: allergies, current medications, past family history, past medical history, past social history, past surgical history and problem list.  Current Outpatient Medications on File Prior to Visit   Medication Sig Dispense Refill   • acetaminophen (TYLENOL) 500 MG tablet Take 500 mg by mouth Every 6 (Six) Hours As Needed for Mild Pain .     • ALPRAZolam (Xanax) 0.25 MG tablet Take 1 tablet by mouth 2 (Two) Times a Day As Needed for Anxiety. 60 tablet 0   • apixaban (Eliquis) 5 MG tablet tablet Take 1 tablet by mouth Every 12 (Twelve) Hours. 180 tablet 3   • azelastine (ASTELIN) 0.1 % nasal spray 2 sprays into the nostril(s) as directed by provider 2 (Two) Times a Day. Use in each nostril as directed 30 mL 12   • calcium carbonate (OS-CARY) 600 MG tablet Take 600 mg by mouth 2 (Two) Times a Day With Meals.     • capecitabine (Xeloda) 500 MG chemo tablet Take 2 tablets (1000 mg) by mouth " in the morning, and 3 tablets (1500 mg) in the evening. Take for 7 days on, followed by 7 days off. 70 tablet 5   • cholecalciferol (VITAMIN D3) 1000 units tablet Take 1,000 Units by mouth Daily.     • Cyanocobalamin ER 1000 MCG tablet controlled-release Take 1 tablet by mouth Daily.     • Diclofenac Sodium (VOLTAREN) 1 % gel gel Place 4 g on the skin as directed by provider.     • DULoxetine (CYMBALTA) 30 MG capsule Take 1 capsule by mouth 2 (Two) Times a Day. 180 capsule 1   • FLONASE ALLERGY RELIEF 50 MCG/ACT nasal spray 2 sprays into each nostril daily.  11   • gabapentin (NEURONTIN) 300 MG capsule TAKE 1 CAPSULE BY MOUTH  TWICE DAILY 180 capsule 3   • Hylan (SYNVISC) 16 MG/2ML solution prefilled syringe injection Inject 16 mg into the appropriate joint as directed by provider As Needed.     • Lialda 1.2 g EC tablet TAKE 1 TABLET BY MOUTH  TWICE DAILY 180 tablet 3   • metaxalone (Skelaxin) 800 MG tablet Take 1 tablet by mouth 3 (Three) Times a Day As Needed for Muscle Spasms. 30 tablet 1   • modafinil (PROVIGIL) 200 MG tablet TAKE ONE (1) TABLET BY MOUTH DAILY. 30 tablet 2   • Multiple Vitamins-Minerals (Multivitamin Gummies Womens) chewable tablet Chew 1 tablet Daily.     • mupirocin (BACTROBAN) 2 % ointment      • ondansetron ODT (ZOFRAN-ODT) 8 MG disintegrating tablet Place 1 tablet on the tongue Every 8 (Eight) Hours As Needed for Nausea or Vomiting. 30 tablet 1   • pantoprazole (PROTONIX) 40 MG EC tablet Take 1 tablet by mouth Daily. 90 tablet 2   • phenazopyridine (Pyridium) 200 MG tablet Take 1 tablet by mouth 3 (Three) Times a Day As Needed for Bladder Spasms. 12 tablet 0   • prochlorperazine (COMPAZINE) 10 MG tablet Take 1 tablet by mouth Every 6 (Six) Hours As Needed for Nausea or Vomiting. 30 tablet 0   • pseudoephedrine (Sudafed) 30 MG tablet Take 1 tablet by mouth Every 6 (Six) Hours As Needed for Congestion. 20 tablet 0   • temazepam (RESTORIL) 15 MG capsule TAKE 1 CAPSULE BY MOUTH AT  NIGHT AS  NEEDED FOR SLEEP 90 capsule 1   • traMADol (ULTRAM) 50 MG tablet TAKE 1 TABLET BY MOUTH EVERY 8  HOURS AS NEEDED FOR MODERATE  PAIN 90 tablet 0   • triamcinolone (KENALOG) 0.1 % cream Apply  topically to the appropriate area as directed 2 (Two) Times a Day. 15 g 0   • trimethoprim (TRIMPEX) 100 MG tablet Take 100 mg by mouth Daily.     • urea (CARMOL) 10 % cream Apply  topically to the appropriate area as directed 3 (Three) Times a Day. 453 g 1   • White Petrolatum-Mineral Oil (Wh Petrol-Mineral Oil-Lanolin) 0.1-0.1 % ointment Apply  to eye(s) as directed by provider.     • promethazine (PHENERGAN) 25 MG tablet Take 1 tablet by mouth Every 6 (Six) Hours As Needed for Nausea or Vomiting. 30 tablet 1     No current facility-administered medications on file prior to visit.     Review of Systems   Constitutional: Negative.    HENT: Negative.    Eyes: Negative.    Respiratory: Negative.    Cardiovascular: Negative.    Gastrointestinal: Negative.    Endocrine: Negative.    Genitourinary: Negative.    Musculoskeletal: Positive for back pain.   Skin: Negative.    Allergic/Immunologic: Negative.    Neurological: Negative.    Hematological: Negative.    Psychiatric/Behavioral: Negative.         Mood is better w/ therapy       Objective   Physical Exam  Vitals and nursing note reviewed.   Constitutional:       Appearance: Normal appearance.   HENT:      Head: Normocephalic and atraumatic.      Right Ear: Tympanic membrane normal.      Left Ear: Tympanic membrane normal.      Nose: Nose normal.      Mouth/Throat:      Mouth: Mucous membranes are moist.   Eyes:      Extraocular Movements: Extraocular movements intact.      Pupils: Pupils are equal, round, and reactive to light.   Cardiovascular:      Rate and Rhythm: Normal rate and regular rhythm.      Pulses: Normal pulses.      Heart sounds: Normal heart sounds.   Pulmonary:      Effort: Pulmonary effort is normal.      Breath sounds: Normal breath sounds.   Abdominal:       "General: Abdomen is flat.      Palpations: Abdomen is soft.   Genitourinary:     General: Normal vulva.   Musculoskeletal:         General: Normal range of motion.      Cervical back: Normal range of motion.   Skin:     General: Skin is warm and dry.      Comments: Left foot w/ compressive wound     Neurological:      General: No focal deficit present.      Mental Status: She is alert and oriented to person, place, and time.   Psychiatric:         Mood and Affect: Mood normal.         Behavior: Behavior normal.         Thought Content: Thought content normal.         Judgment: Judgment normal.          /60   Pulse 99   Ht 157.5 cm (62\")   Wt 83 kg (183 lb)   LMP  (LMP Unknown)   BMI 33.47 kg/m²     Assessment & Plan   Diagnoses and all orders for this visit:    Hypertension, unspecified type    Healthcare maintenance    Osteoarthritis of both shoulders, unspecified osteoarthritis type    Hyperlipidemia, unspecified hyperlipidemia type    Malignant neoplasm of overlapping sites of right breast in female, estrogen receptor negative (HCC)    Other orders  -     rosuvastatin (Crestor) 5 MG tablet; Take 1 tablet by mouth Daily.      Patient w/ progressive weakness cmt. She will get new braces for B lw. She will continue current med for bp regulation. She will start crestor for lipid regulation. She will increase tramadol to tid and monitor pain. She will f/u routinely         "

## 2022-12-19 NOTE — PROGRESS NOTES
Specialty Pharmacy Refill Coordination Note     Martha is a 62 y.o. female contacted today regarding refills of  Xeloda specialty medication(s).    Reviewed and verified with patient:       Specialty medication(s) and dose(s) confirmed: yes    Refill Questions    Flowsheet Row Most Recent Value   Changes to allergies? No   Changes to medications? No   New conditions since last clinic visit No   Unplanned office visit, urgent care, ED, or hospital admission in the last 4 weeks  No   How does patient/caregiver feel medication is working? Good   Financial problems or insurance changes  No   Since the previous refill, were any specialty medication doses or scheduled injections missed or delayed?  No   Does this patient require a clinical escalation to a pharmacist? No          Delivery Questions    Flowsheet Row Most Recent Value   Delivery method [employer]  [[employer] priority sig required ship 12/27 deliver 12/28]   Delivery address correct? Yes   Preferred delivery time? AM   Number of medications in delivery 1   Medication being filled and delivered Xeloda   Doses left of specialty medications 1 week- starts this coming Sunday.   Is there any medication that is due not being filled? No   Supplies needed? No supplies needed   Cooler needed? No   Do any medications need mixed or dated? No   Copay form of payment Payment plan already set up   Questions or concerns for the pharmacist? No   Are any medications first time fills? No                 Follow-up: 3 week(s)     Ami Hudson  Specialty Pharmacy Technician

## 2022-12-20 ENCOUNTER — ANESTHESIA EVENT (OUTPATIENT)
Dept: SURGERY | Facility: SURGERY CENTER | Age: 62
End: 2022-12-20

## 2022-12-20 ENCOUNTER — ANESTHESIA (OUTPATIENT)
Dept: SURGERY | Facility: SURGERY CENTER | Age: 62
End: 2022-12-20

## 2022-12-20 ENCOUNTER — HOSPITAL ENCOUNTER (OUTPATIENT)
Facility: SURGERY CENTER | Age: 62
Setting detail: HOSPITAL OUTPATIENT SURGERY
Discharge: HOME | End: 2022-12-20
Attending: INTERNAL MEDICINE | Admitting: INTERNAL MEDICINE

## 2022-12-20 VITALS
OXYGEN SATURATION: 96 % | WEIGHT: 182 LBS | DIASTOLIC BLOOD PRESSURE: 75 MMHG | BODY MASS INDEX: 33.29 KG/M2 | SYSTOLIC BLOOD PRESSURE: 126 MMHG | TEMPERATURE: 97.7 F | RESPIRATION RATE: 16 BRPM | HEART RATE: 85 BPM

## 2022-12-20 DIAGNOSIS — Z12.11 ENCOUNTER FOR SCREENING COLONOSCOPY: ICD-10-CM

## 2022-12-20 DIAGNOSIS — K63.5 POLYP OF COLON, UNSPECIFIED PART OF COLON, UNSPECIFIED TYPE: ICD-10-CM

## 2022-12-20 PROCEDURE — 45378 DIAGNOSTIC COLONOSCOPY: CPT | Performed by: INTERNAL MEDICINE

## 2022-12-20 PROCEDURE — 25010000002 PROPOFOL 10 MG/ML EMULSION: Performed by: ANESTHESIOLOGY

## 2022-12-20 RX ORDER — ONDANSETRON 2 MG/ML
4 INJECTION INTRAMUSCULAR; INTRAVENOUS ONCE AS NEEDED
Status: DISCONTINUED | OUTPATIENT
Start: 2022-12-20 | End: 2022-12-20 | Stop reason: HOSPADM

## 2022-12-20 RX ORDER — MAGNESIUM HYDROXIDE 1200 MG/15ML
LIQUID ORAL AS NEEDED
Status: DISCONTINUED | OUTPATIENT
Start: 2022-12-20 | End: 2022-12-20 | Stop reason: HOSPADM

## 2022-12-20 RX ORDER — SODIUM CHLORIDE 0.9 % (FLUSH) 0.9 %
3 SYRINGE (ML) INJECTION EVERY 12 HOURS SCHEDULED
Status: DISCONTINUED | OUTPATIENT
Start: 2022-12-20 | End: 2022-12-20 | Stop reason: HOSPADM

## 2022-12-20 RX ORDER — SODIUM CHLORIDE 0.9 % (FLUSH) 0.9 %
10 SYRINGE (ML) INJECTION AS NEEDED
Status: DISCONTINUED | OUTPATIENT
Start: 2022-12-20 | End: 2022-12-20 | Stop reason: HOSPADM

## 2022-12-20 RX ORDER — SODIUM CHLORIDE, SODIUM LACTATE, POTASSIUM CHLORIDE, CALCIUM CHLORIDE 600; 310; 30; 20 MG/100ML; MG/100ML; MG/100ML; MG/100ML
30 INJECTION, SOLUTION INTRAVENOUS CONTINUOUS PRN
Status: DISCONTINUED | OUTPATIENT
Start: 2022-12-20 | End: 2022-12-20 | Stop reason: HOSPADM

## 2022-12-20 RX ORDER — PROPOFOL 10 MG/ML
VIAL (ML) INTRAVENOUS CONTINUOUS PRN
Status: DISCONTINUED | OUTPATIENT
Start: 2022-12-20 | End: 2022-12-20 | Stop reason: SURG

## 2022-12-20 RX ORDER — LIDOCAINE HYDROCHLORIDE 10 MG/ML
0.5 INJECTION, SOLUTION INFILTRATION; PERINEURAL ONCE AS NEEDED
Status: DISCONTINUED | OUTPATIENT
Start: 2022-12-20 | End: 2022-12-20 | Stop reason: HOSPADM

## 2022-12-20 RX ORDER — PROPOFOL 10 MG/ML
VIAL (ML) INTRAVENOUS AS NEEDED
Status: DISCONTINUED | OUTPATIENT
Start: 2022-12-20 | End: 2022-12-20 | Stop reason: SURG

## 2022-12-20 RX ORDER — LIDOCAINE HYDROCHLORIDE 20 MG/ML
INJECTION, SOLUTION INFILTRATION; PERINEURAL AS NEEDED
Status: DISCONTINUED | OUTPATIENT
Start: 2022-12-20 | End: 2022-12-20 | Stop reason: SURG

## 2022-12-20 RX ORDER — SODIUM CHLORIDE, SODIUM LACTATE, POTASSIUM CHLORIDE, CALCIUM CHLORIDE 600; 310; 30; 20 MG/100ML; MG/100ML; MG/100ML; MG/100ML
1000 INJECTION, SOLUTION INTRAVENOUS CONTINUOUS
Status: DISCONTINUED | OUTPATIENT
Start: 2022-12-20 | End: 2022-12-20 | Stop reason: HOSPADM

## 2022-12-20 RX ADMIN — Medication 180 MCG/KG/MIN: at 07:38

## 2022-12-20 RX ADMIN — PROPOFOL 100 MG: 10 INJECTION, EMULSION INTRAVENOUS at 07:38

## 2022-12-20 RX ADMIN — SODIUM CHLORIDE, POTASSIUM CHLORIDE, SODIUM LACTATE AND CALCIUM CHLORIDE 1000 ML: 600; 310; 30; 20 INJECTION, SOLUTION INTRAVENOUS at 07:09

## 2022-12-20 RX ADMIN — LIDOCAINE HYDROCHLORIDE 50 MG: 20 INJECTION, SOLUTION INFILTRATION; PERINEURAL at 07:38

## 2022-12-20 NOTE — H&P
No chief complaint on file.      HPI  Patient here for colonoscopy for screening due to family history of polyps and also evaluation of diarrhea.         Problem List:    Patient Active Problem List   Diagnosis   • Malignant neoplasm of overlapping sites of right breast in female, estrogen receptor negative (HCC)   • Hematuria   • Hyperlipidemia   • Hypertension   • Hereditary sensorimotor neuropathy   • Hyperglycemia   • MARIA M on CPAP   • Gastroesophageal reflux disease   • Colon polyp   • Diverticulitis of colon with perforation   • Foot pain   • Osteoarthritis of knee   • Leukocytosis   • Osteoarthritis of shoulder   • Senile osteopenia   • Chronic right-sided thoracic back pain   • Closed fracture of left fibula and tibia   • Closed displaced spiral fracture of shaft of left tibia   • Cancer associated pain   • Cord compression (HCC)   • Closed T6 spinal fracture (HCC)   • History of pulmonary embolism   • Bone metastases (HCC)   • Surgery follow-up examination   • Dysuria   • Pericardial effusion   • Other fatigue   • Acute pulmonary embolism (HCC)   • Breast cancer, right (HCC)   • 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)   • Mood disorder (HCC)   • 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   • Nausea & vomiting   • Intractable vomiting   • Metastatic breast cancer (HCC)   • Iron deficiency anemia   • Adverse effect of iron   • Encounter for screening colonoscopy       Medical History:    Past Medical History:   Diagnosis Date   • Acute pulmonary embolism, cancer-related 10/2017   • Allergic rhinitis    • Arthritis     Right knee; left shoulder   • Cancer (HCC) 05/2010    Right breast   • Cancer (HCC) 10/2017    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   • Charcot-Saloni-Tooth disease    • CPAP (continuous positive airway pressure) dependence    • Dry eye    • GERD (gastroesophageal reflux disease)    • H/O Colon polyps    • H/O Uterine fibroid    • Heart murmur    • Hyperlipidemia    • Hypertension    • PONV  (postoperative nausea and vomiting)         Social History:    Social History     Socioeconomic History   • Marital status:      Spouse name: Murray   • Number of children: 3   • Years of education: College   Tobacco Use   • Smoking status: Never   • Smokeless tobacco: Never   Vaping Use   • Vaping Use: Never used   Substance and Sexual Activity   • Alcohol use: Yes     Comment: social   • Drug use: No   • Sexual activity: Defer       Family History:   Family History   Problem Relation Age of Onset   • Colon polyps Mother    • Colon cancer Mother    • Heart disease Mother    • Hyperlipidemia Mother    • Hypertension Mother    • Diabetes Father    • Charcot-Saloni-Tooth disease Father    • Heart disease Father    • Hypertension Father    • Heart failure Father    • Colon polyps Sister    • Diabetes Sister    • Heart disease Sister    • Hypertension Sister    • Colon polyps Brother    • Depression Brother    • Heart disease Maternal Aunt    • Scoliosis Maternal Aunt    • Heart disease Maternal Uncle    • Diabetes Maternal Grandmother    • Heart disease Maternal Grandmother    • Hypertension Maternal Grandmother    • Uterine cancer Maternal Grandmother    • Heart disease Maternal Grandfather    • Crohn's disease Neg Hx    • Irritable bowel syndrome Neg Hx    • Ulcerative colitis Neg Hx        Surgical History:   Past Surgical History:   Procedure Laterality Date   • BLADDER SURGERY      Bladder lift   • BREAST RECONSTRUCTION, BREAST TISSUE EXPANDER INSERTION Right    • BREAST SURGERY Right 2010    Mastectomy   •  SECTION  ,    • CHOLECYSTECTOMY     • COLONOSCOPY     • ENDOSCOPY N/A 2022    Procedure: ESOPHAGOGASTRODUODENOSCOPY;  Surgeon: Theodore Najera MD;  Location: [facility] ENDOSCOPY;  Service: Gastroenterology;  Laterality: N/A;  PRE: GERD  POST: HIATAL HERNIA, GASTRITIS   • HERNIA REPAIR  2015    Ventral   • HYSTERECTOMY      Partial   • KNEE SURGERY Right    • LEG  SURGERY Left     Broken   • MASTECTOMY Right 2010   • ID TREAT TIBIAL SHAFT FX, INTRAMED IMPLANT Left 12/6/2017    Procedure: TIBIA INTRAMEDULLARY NAIL/DON INSERTION;  Surgeon: Romero Shanks MD;  Location: [facility];  Service: Orthopedics   • THORACIC DECOMPRESSION POSTERIOR FUSION WITH INSTRUMENTATION N/A 12/11/2017    Procedure: T6 costotransversectomy and decompression with T3 to  T9 posterior spinal fusion with instrumentation with Jennifer Gonzalez and Nael Dennis Cellsaver;  Surgeon: Quan Nayak MD;  Location: [facility];  Service:          Current Facility-Administered Medications:   •  lactated ringers infusion 1,000 mL, 1,000 mL, Intravenous, Continuous, Theodore Najera MD, Last Rate: 25 mL/hr at 12/20/22 0709, 1,000 mL at 12/20/22 0709  •  lactated ringers infusion, 30 mL/hr, Intravenous, Continuous PRN, Delma Odonnell, APRN  •  lidocaine (XYLOCAINE) 1 % injection 0.5 mL, 0.5 mL, Intradermal, Once PRN, Theodore Najera MD  •  sodium chloride 0.9 % flush 10 mL, 10 mL, Intravenous, PRN, Delma Odonnell, APRN  •  sodium chloride 0.9 % flush 10 mL, 10 mL, Intravenous, PRN, Theodore Najera MD  •  sodium chloride 0.9 % flush 3 mL, 3 mL, Intravenous, Q12H, Delma Odonnell, APRN  •  sterile water irrigation solution, , , PRN, Theodore Najera MD, 1,000 mL at 12/20/22 0706    Allergies:   Allergies   Allergen Reactions   • Hydrocodone Nausea Only   • Morphine And Related Nausea And Vomiting        The following portions of the patient's history were reviewed by me and updated as appropriate: review of systems, allergies, current medications, past family history, past medical history, past social history, past surgical history and problem list.    Vitals:    12/20/22 0654   BP: 139/70   Pulse: 95   Resp: 16   Temp: 97.5 °F (36.4 °C)   SpO2: 96%       PHYSICAL EXAM:    CONSTITUTIONAL:  today's vital signs reviewed by me  GASTROINTESTINAL: abdomen is soft nontender nondistended with normal  active bowel sounds, no masses are appreciated    Assessment/ Plan  We will proceed today with colonoscopy.    Risks and benefits as well as alternatives to endoscopic evaluation were explained to the patient and they voiced understanding and wish to proceed.  These risks include but are not limited to the risk of bleeding, perforation, adverse reaction to sedation, and missed lesions.  The patient was given the opportunity to ask questions prior to the endoscopic procedure.

## 2022-12-20 NOTE — ANESTHESIA PREPROCEDURE EVALUATION
Anesthesia Evaluation     Patient summary reviewed and Nursing notes reviewed   history of anesthetic complications: PONV  NPO Solid Status: > 8 hours  NPO Liquid Status: > 2 hours           Airway   Mallampati: III  Possible difficult intubation  Dental - normal exam     Pulmonary - normal exam   (+) pulmonary embolism, sleep apnea on CPAP,   Cardiovascular - normal exam    ECG reviewed    (+) hypertension, valvular problems/murmurs murmur, pericardial effusion, hyperlipidemia,       Neuro/Psych  (+) numbness, psychiatric history,    GI/Hepatic/Renal/Endo    (+) obesity,  GERD,      Musculoskeletal         ROS comment: Hereditary sensorimotor neuropathy  Abdominal   (+) obese,    Substance History      OB/GYN          Other   arthritis,    history of cancer                    Anesthesia Plan    ASA 3     MAC       Anesthetic plan, risks, benefits, and alternatives have been provided, discussed and informed consent has been obtained with: patient.        CODE STATUS:

## 2022-12-20 NOTE — ANESTHESIA POSTPROCEDURE EVALUATION
Patient: Martha Davey    Procedure Summary     Date: 12/20/22 Room / Location: [facility]    Anesthesia Start: 0731 Anesthesia Stop: 0756    Procedure: COLONOSCOPY FOR SCREENING - unable to do procedure. Diagnosis:       Encounter for screening colonoscopy      Polyp of colon, unspecified part of colon, unspecified type      (Encounter for screening colonoscopy [Z12.11])      (Polyp of colon, unspecified part of colon, unspecified type [K63.5])    Surgeons: Theodore Najera MD Provider: Sherri Sheriff MD    Anesthesia Type: MAC ASA Status: 3          Anesthesia Type: MAC    Vitals  Vitals Value Taken Time   /75 12/20/22 0822   Temp 36.5 °C (97.7 °F) 12/20/22 0758   Pulse 85 12/20/22 0822   Resp 16 12/20/22 0822   SpO2 96 % 12/20/22 0822           Post Anesthesia Care and Evaluation    Patient location during evaluation: PHASE II  Patient participation: complete - patient participated  Level of consciousness: awake  Pain management: adequate    Airway patency: patent  Anesthetic complications: No anesthetic complications    Cardiovascular status: acceptable  Respiratory status: acceptable  Hydration status: acceptable    Comments: /75   Pulse 85   Temp 36.5 °C (97.7 °F)   Resp 16   Wt 82.6 kg (182 lb)   LMP  (LMP Unknown)   SpO2 96%   BMI 33.29 kg/m²

## 2022-12-21 ENCOUNTER — SPECIALTY PHARMACY (OUTPATIENT)
Dept: PHARMACY | Facility: HOSPITAL | Age: 62
End: 2022-12-21

## 2022-12-27 ENCOUNTER — LAB (OUTPATIENT)
Dept: LAB | Facility: HOSPITAL | Age: 62
End: 2022-12-27
Payer: MEDICARE

## 2022-12-27 ENCOUNTER — TELEPHONE (OUTPATIENT)
Dept: ONCOLOGY | Facility: CLINIC | Age: 62
End: 2022-12-27

## 2022-12-27 ENCOUNTER — APPOINTMENT (OUTPATIENT)
Dept: ONCOLOGY | Facility: HOSPITAL | Age: 62
End: 2022-12-27
Payer: MEDICARE

## 2022-12-27 ENCOUNTER — SPECIALTY PHARMACY (OUTPATIENT)
Dept: PHARMACY | Facility: HOSPITAL | Age: 62
End: 2022-12-27

## 2022-12-27 ENCOUNTER — APPOINTMENT (OUTPATIENT)
Dept: OTHER | Facility: HOSPITAL | Age: 62
End: 2022-12-27
Payer: MEDICARE

## 2022-12-27 ENCOUNTER — INFUSION (OUTPATIENT)
Dept: ONCOLOGY | Facility: HOSPITAL | Age: 62
End: 2022-12-27
Payer: MEDICARE

## 2022-12-27 DIAGNOSIS — Z17.1 MALIGNANT NEOPLASM OF OVERLAPPING SITES OF RIGHT BREAST IN FEMALE, ESTROGEN RECEPTOR NEGATIVE: Primary | ICD-10-CM

## 2022-12-27 DIAGNOSIS — Z17.1 MALIGNANT NEOPLASM OF OVERLAPPING SITES OF RIGHT BREAST IN FEMALE, ESTROGEN RECEPTOR NEGATIVE: ICD-10-CM

## 2022-12-27 DIAGNOSIS — C50.811 MALIGNANT NEOPLASM OF OVERLAPPING SITES OF RIGHT BREAST IN FEMALE, ESTROGEN RECEPTOR NEGATIVE: ICD-10-CM

## 2022-12-27 DIAGNOSIS — C50.811 MALIGNANT NEOPLASM OF OVERLAPPING SITES OF RIGHT BREAST IN FEMALE, ESTROGEN RECEPTOR NEGATIVE: Primary | ICD-10-CM

## 2022-12-27 LAB
ALBUMIN SERPL-MCNC: 4.1 G/DL (ref 3.5–5.2)
ALBUMIN/GLOB SERPL: 1.5 G/DL (ref 1.1–2.4)
ALP SERPL-CCNC: 59 U/L (ref 38–116)
ALT SERPL W P-5'-P-CCNC: 15 U/L (ref 0–33)
ANION GAP SERPL CALCULATED.3IONS-SCNC: 10.6 MMOL/L (ref 5–15)
AST SERPL-CCNC: 17 U/L (ref 0–32)
BASOPHILS # BLD AUTO: 0.03 10*3/MM3 (ref 0–0.2)
BASOPHILS NFR BLD AUTO: 0.5 % (ref 0–1.5)
BILIRUB SERPL-MCNC: 0.4 MG/DL (ref 0.2–1.2)
BUN SERPL-MCNC: 24 MG/DL (ref 6–20)
BUN/CREAT SERPL: 27.3 (ref 7.3–30)
CALCIUM SPEC-SCNC: 9.9 MG/DL (ref 8.5–10.2)
CHLORIDE SERPL-SCNC: 100 MMOL/L (ref 98–107)
CO2 SERPL-SCNC: 31.4 MMOL/L (ref 22–29)
CREAT SERPL-MCNC: 0.88 MG/DL (ref 0.6–1.1)
DEPRECATED RDW RBC AUTO: 60.2 FL (ref 37–54)
EGFRCR SERPLBLD CKD-EPI 2021: 74.4 ML/MIN/1.73
EOSINOPHIL # BLD AUTO: 0.07 10*3/MM3 (ref 0–0.4)
EOSINOPHIL NFR BLD AUTO: 1.1 % (ref 0.3–6.2)
ERYTHROCYTE [DISTWIDTH] IN BLOOD BY AUTOMATED COUNT: 15.8 % (ref 12.3–15.4)
GLOBULIN UR ELPH-MCNC: 2.8 GM/DL (ref 1.8–3.5)
GLUCOSE SERPL-MCNC: 93 MG/DL (ref 74–124)
HCT VFR BLD AUTO: 42.3 % (ref 34–46.6)
HGB BLD-MCNC: 13.9 G/DL (ref 12–15.9)
IMM GRANULOCYTES # BLD AUTO: 0.01 10*3/MM3 (ref 0–0.05)
IMM GRANULOCYTES NFR BLD AUTO: 0.2 % (ref 0–0.5)
LYMPHOCYTES # BLD AUTO: 1.31 10*3/MM3 (ref 0.7–3.1)
LYMPHOCYTES NFR BLD AUTO: 21.5 % (ref 19.6–45.3)
MAGNESIUM SERPL-MCNC: 2.1 MG/DL (ref 1.8–2.5)
MCH RBC QN AUTO: 34.2 PG (ref 26.6–33)
MCHC RBC AUTO-ENTMCNC: 32.9 G/DL (ref 31.5–35.7)
MCV RBC AUTO: 103.9 FL (ref 79–97)
MONOCYTES # BLD AUTO: 0.5 10*3/MM3 (ref 0.1–0.9)
MONOCYTES NFR BLD AUTO: 8.2 % (ref 5–12)
NEUTROPHILS NFR BLD AUTO: 4.18 10*3/MM3 (ref 1.7–7)
NEUTROPHILS NFR BLD AUTO: 68.5 % (ref 42.7–76)
NRBC BLD AUTO-RTO: 0 /100 WBC (ref 0–0.2)
PHOSPHATE SERPL-MCNC: 4 MG/DL (ref 2.5–4.5)
PLATELET # BLD AUTO: 245 10*3/MM3 (ref 140–450)
PMV BLD AUTO: 9.8 FL (ref 6–12)
POTASSIUM SERPL-SCNC: 4.8 MMOL/L (ref 3.5–4.7)
PROT SERPL-MCNC: 6.9 G/DL (ref 6.3–8)
RBC # BLD AUTO: 4.07 10*6/MM3 (ref 3.77–5.28)
SODIUM SERPL-SCNC: 142 MMOL/L (ref 134–145)
WBC NRBC COR # BLD: 6.1 10*3/MM3 (ref 3.4–10.8)

## 2022-12-27 PROCEDURE — 83735 ASSAY OF MAGNESIUM: CPT

## 2022-12-27 PROCEDURE — 85025 COMPLETE CBC W/AUTO DIFF WBC: CPT

## 2022-12-27 PROCEDURE — 96372 THER/PROPH/DIAG INJ SC/IM: CPT

## 2022-12-27 PROCEDURE — 36415 COLL VENOUS BLD VENIPUNCTURE: CPT

## 2022-12-27 PROCEDURE — 80053 COMPREHEN METABOLIC PANEL: CPT

## 2022-12-27 PROCEDURE — 84100 ASSAY OF PHOSPHORUS: CPT

## 2022-12-27 PROCEDURE — 25010000002 DENOSUMAB 120 MG/1.7ML SOLUTION: Performed by: NURSE PRACTITIONER

## 2022-12-27 RX ADMIN — DENOSUMAB 120 MG: 120 INJECTION SUBCUTANEOUS at 15:26

## 2022-12-27 NOTE — TELEPHONE ENCOUNTER
Caller: Martha Davey    Relationship: Self    Best call back number:[phone]      What was the call regarding: PT MISSED CALL FROM [facility]    Do you require a callback: YES

## 2022-12-27 NOTE — PROGRESS NOTES
I received message that pt was trying to reach me.  It was about the credit card for her .  She wanted to let me know it should work now.

## 2022-12-28 ENCOUNTER — TELEPHONE (OUTPATIENT)
Dept: ONCOLOGY | Facility: CLINIC | Age: 62
End: 2022-12-28

## 2022-12-28 DIAGNOSIS — F33.1 MAJOR DEPRESSIVE DISORDER, RECURRENT EPISODE, MODERATE: ICD-10-CM

## 2022-12-28 DIAGNOSIS — R53.0 NEOPLASTIC MALIGNANT RELATED FATIGUE: ICD-10-CM

## 2022-12-28 RX ORDER — MODAFINIL 200 MG/1
TABLET ORAL
Qty: 30 TABLET | Refills: 2 | Status: SHIPPED | OUTPATIENT
Start: 2022-12-28 | End: 2023-02-23 | Stop reason: SDUPTHER

## 2022-12-28 NOTE — TELEPHONE ENCOUNTER
Caller: Martha Davey    Relationship to patient: Self    Best call back number: [phone]    Chief complaint: PATIENT CALLING [facility] REGARDING PAYMENT INFORMATION

## 2022-12-29 ENCOUNTER — PREP FOR SURGERY (OUTPATIENT)
Dept: SURGERY | Facility: SURGERY CENTER | Age: 62
End: 2022-12-29
Payer: MEDICARE

## 2022-12-29 DIAGNOSIS — Z86.010 HISTORY OF COLON POLYPS: Primary | ICD-10-CM

## 2022-12-29 RX ORDER — SODIUM CHLORIDE, SODIUM LACTATE, POTASSIUM CHLORIDE, CALCIUM CHLORIDE 600; 310; 30; 20 MG/100ML; MG/100ML; MG/100ML; MG/100ML
30 INJECTION, SOLUTION INTRAVENOUS CONTINUOUS PRN
Status: CANCELLED | OUTPATIENT
Start: 2022-12-29

## 2023-01-01 NOTE — TELEPHONE ENCOUNTER
Patient has finished the antibiotics and is still having symptoms.  Wants to see if she can get another round of meds   [place] infant of 40 completed weeks of gestation

## 2023-01-05 NOTE — PROGRESS NOTES
Chief Complaint  Previous Stage Ib ([id number]) ER/NC positive, HER-2/peter negative right breast cancer with subsequent metastatic disease identified 10/8/2017, history of right pulmonary embolism, cancer related pain, chemotherapy-induced diarrhea, chemotherapy-induced mucositis, chemotherapy-induced hand-foot syndrome    Subjective        History of Present Illness  The patient returns today in follow-up continuing on oral Xeloda 1000 mg in the morning, 1500 mg in the evening for 7 days on followed by 7 days off as well as monthly Xgeva and Eliquis 5 mg twice daily.  The patient last received Xgeva on 12/27/2022, 1 day delayed due to the Christmas holiday and office closure.  The patient today reports that she has done reasonably well since then.  She does have significant hand-foot syndrome related to oral Xeloda, symptoms do wax and wane over time.  Currently the palms of her hands have improved significantly, dexterity remains mildly affected due to sclerodactyly which is currently stable to improved.  She notes that her ocular dryness and irritation is stable.  Appetite is stable, weight has declined slightly at 180 pounds.  She does note she has had a significant amount of stress recently related to both her and her 's medical issues.  She is scheduled to see supportive oncology in follow-up on 1/11/2023.  She did undergo colonoscopy on 12/20/2022 and this was a poor prep, repeat planned on 2/2/2023.  Patient notes 2 areas of recent pain.  She notes a pain in her right posterior lower costal margin that occurs with deep inspiration and is intermittent in nature, has been occurring for the past few weeks.  It does wax and wane.  She denies any associated cough or shortness of breath.  She denies any fevers.  She also notes some right upper abdominal pain intermittently that has occurred over the past 6 weeks, is mild in nature.  She does note that she was referred by Dr. Bermudez to a [facility] facility  in terms of her lower extremity chronic braces and has new devices that are working better for her.      Objective   Vital Signs:   /71   Pulse 99   Temp 96.8 °F (36 °C) (Temporal)   Resp 16   Ht 157.5 cm (62.01\")   Wt 82.1 kg (180 lb 14.4 oz)   SpO2 96%   BMI 33.08 kg/m²     Physical Exam  Constitutional:       Appearance: She is well-developed.      Comments: Patient ambulates with the use of a cane   Eyes:      Conjunctiva/sclera: Conjunctivae normal.   Neck:      Thyroid: No thyromegaly.   Cardiovascular:      Rate and Rhythm: Normal rate and regular rhythm.      Heart sounds: No murmur heard.    No friction rub. No gallop.   Pulmonary:      Effort: No respiratory distress.      Breath sounds: Normal breath sounds.   Abdominal:      General: Bowel sounds are normal. There is no distension.      Palpations: Abdomen is soft.      Tenderness: There is no abdominal tenderness.   Musculoskeletal:      Comments: Braces in place in the bilateral lower extremities   Lymphadenopathy:      Head:      Right side of head: No submandibular adenopathy.      Cervical: No cervical adenopathy.      Upper Body:      Right upper body: No supraclavicular adenopathy.      Left upper body: No supraclavicular adenopathy.   Skin:     General: Skin is warm and dry.      Findings: Rash present.      Comments: Erythema involving the palms has improved significantly with only minimal areas of skin flaking and peeling.  The erythema on the dorsal aspect of the hands is stable to slightly increased.  Sclerodactyly and finger mobility stable to slightly improved.   Neurological:      Mental Status: She is alert and oriented to person, place, and time.      Cranial Nerves: No cranial nerve deficit.      Motor: No abnormal muscle tone.      Deep Tendon Reflexes: Reflexes normal.   Psychiatric:         Behavior: Behavior normal.         Result Review : Reviewed CBC, CMP from today.     Assessment and Plan     1. Previous Stage Ib  ([id number]) right breast cancer:  · Diagnosed May 2010 with excisional biopsy for invasive ductal carcinoma, 1.3 cm, grade 2, ER 90%, MS 80%, HER-2/peter negative (1+ IHC).    · Subsequent right mastectomy in July 2010 with no residual breast malignancy, 1/5 sentinel lymph node with micrometastasis (0.25 mm).    · Treated in the [facility] system with adjuvant AC ×4 cycles in 2010 (no taxanes administered due to underlying Charcot-Saloni-Tooth with peripheral neuropathy).    · Adjuvant Femara (postmenopausal) initiated October 2010 with plan to treat ×10 years.    · Genetic testing reportedly negative.    · Developed osteopenia treated with Prolia beginning 2/27/13. Subsequently discontinued due to identification of metastatic disease.  2. Recurrent/metastatic disease identified 10/8/17:  · Disease involving thoracic spine with cord compression at T6, lumbosacral involvement, sternal and right sternoclavicular involvement.    · Femara discontinued in 10/2017.    · Radiation administered (in the [facility] system) to the thoracic spine beginning 10/19/17 treating T3-T9 to a dose of 24 gray in 6 fractions.  · Evaluation with MRI 12/8/17 showing persistent T6 cord compression with persistent neurologic compromise requiring surgical treatment 12/11/17 with T6 laminectomy/corpectomy and T3-T9 fusion.  Pathology with metastatic carcinoma of breast origin, ER negative, MS negative, HER-2/peter negative (1+ IHC).  · Additional staging evaluation 12/8/17 with no evidence of visceral metastatic disease, bone scan showing involvement of thoracic spine, sternum, left humerus, mid frontal bone.  No plane film correlate of left humerus lesion.  MRI lumbar spine with small intradural L3 metastasis.  CA 15-3 12/6/17- 17.  · Palliative radiation therapy to L3 dural metastasis and left humerus initiated 1/15/18 (10 fractions), completed 1/26/18.  · Hypercalcemia of malignancy with calcium in the 10-11 range.  · Initiation of monthly Xgeva  1/23/18.  · Baseline CT scan 1/30/18 with no evidence of visceral involvement.  Cluster of nodular opacities in the right lower lobe suspected to be infectious or related to bronchiolitis. Bone scan 1/30/18 showed postsurgical change in the thoracic spine, stable uptake in the frontal bone, no new areas of disease.  · Initiation of palliative oral single agent Xeloda 2/7/18 2000 mg a.m., 1500 mg p.m. for 14/21 days.   · Following 3 cycles xeloda, bone scan 4/4/18 showed no change from the prior study.  CT scan 4/4/18 showed a small pericardial effusion of unclear significance as well as a subcutaneous nodule in the right lateral chest wall.  Subsequent evaluation with echocardiogram 4/17/18 showed no evidence of pericardial effusion.  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  We discussed the possibility of surgical excision of the right subcutaneous chest wall lesion for more definitive diagnosis.  Reviewed previous CT images dating back to 12/8/17 and the lesion was present even at that time measuring around 1.7 cm although not commented on in the radiology report.  As this appears to be an indolent low-grade process unrelated to her breast cancer, recommendee foregoing surgical excision at this time and monitoring this area on future scans.  The patient agreed.    · Following 6 cycles of Xeloda, CT 6/6/18  showed stable findings, no evidence of progressive disease.  There was a comment regarding subcutaneous abnormality in the anterior abdominal wall and this was related to Lovenox injection sites.  Bone scan 6/6/18 showed no interval change.   · CT scan and bone scan 8/13/18 following 9 cycles of Xeloda showed stable findings with no evidence of significant visceral metastases.  Her bone lesions appear stable on bone scan.  The spindle cell neoplasm in her right chest  wall actually decreased in size from 2 cm down to 1.6 cm.    · The patient experienced some symptoms of diarrhea, anorexia, generalized weakness during cycle 9 Xeloda it was unclear whether this was related to a viral gastroenteritis or toxicity from treatment.  Symptoms recurred during cycle 10 and treatment was cut short by 2 days.  Symptoms attributed to Xeloda.  With cycle 11, dose and schedule altered to 1500 mg twice daily for 7 days on followed by 7 days off .  · CT scan 9/9/2020 with no significant changes.  Bone scan 9/9/2020 read as unchanged from prior studies however did note an area of slight activity in the medial left femur.  Contacted radiology and although this was not noted on prior reports appears to have been present.  Subsequent MRI left femur 9/21/2020 with cortical thickening and periosteal edema left iliac us muscle insertion to the medial left femur with no evidence of metastatic disease, favored to represent periosteal change secondary to insertional tendinitis.  · Severe hand-foot symptoms causing sclerodactyly and limitation in finger movement prompted change in dosing in July 2021 with Xeloda decreased to 1000 mg a.m., 1500 mg p.m. for 7 days on followed by 7 days off.  · Patient was experiencing severe hand-foot syndrome related to Xeloda having developed skin peeling, sclerodactyly with limited use of her hands.  On 3/31/2022, Xeloda was held to allow for recovery.  Patient resumed Xeloda on 4/18/2022.  · Patient required hospitalization 5/29 - 6/1/2022 with presumed viral gastroenteritis that was exacerbated by Xeloda.  Xeloda held briefly, resumed on 6/6/2022.  · Patient again with worsening sclerodactyly, Xeloda held for 2 weeks from 10/3 - 10/17/2022, resumed at prior dose with improvement in symptoms.  · Most recent scans from 10/24/2022 with CT chest abdomen pelvis that showed stable findings including left supraclavicular lymph node, right lower lobe pulmonary nodules, bony  lesions.  Bone scan with stable findings.  · The patient returns today continuing on treatment with Xeloda 1000 mg a.m., 1500 mg p.m. 7 days on followed by 7 days off (due to resume Xeloda today).  She also continues on monthly Xgeva that was last administered on 12/27/2022, 1 day delayed due to the Christmas holiday and office closure on 12/26/2022.  We hope to return to a Monday schedule with subsequent treatment due in 2 weeks.  The patient is tolerating Xeloda reasonably well, hand-foot symptoms have been a significant issue for her but do wax and wane significantly.  Currently the degree of erythema and skin peeling involving the palmar surface of her hands has improved significantly however the involvement on the dorsal aspect of her hands is fairly stable.  Sclerodactyly is stable to slightly improved with mild effect on her dexterity.  She feels that symptoms are acceptable to continue with treatment.  Patient does have some concerning recent symptoms.  She reports some pleuritic pain in the posterior right chest wall that has occurred intermittently with deep inspiration.  She also notes some right abdominal discomfort that is occurred intermittently over the past 6 weeks or so.  She has not experienced any change in her bowel function, denies any respiratory symptoms (no cough, no shortness of breath), no symptoms to suggest infection.  Her appetite is stable, weight has declined slightly 180 pounds.  I did suggest that we go ahead and obtain repeat scans within the next month.  She will return in 2 weeks for Xgeva.  In 3 weeks she will undergo CT scans and bone scan and I will see her in 4 weeks for follow-up.  3. Right pulmonary embolism:  · Diagnosed on CT angiogram 10/21/17 involving small right lower lobe pulmonary artery.  Lower extremity Dopplers negative.  · Bilateral lower extremity Dopplers negative again 12/5/17.  · Received chronic Lovenox 1 mg/kg twice per day, transition to oral Eliquis in  February 2019, continuing on Eliquis 5 mg twice daily.  · Patient continues on anticoagulation without any complications.  4. Cancer related pain:  · Previously receiving Duragesic 50 µg patch every 72 hours along with Dilaudid 4 mg as needed for breakthrough pain  · The patient's pain improved over time and she was able to discontinue both Duragesic and Dilaudid in the interval.  · Patient does take occasional Flexeril at bedtime due to back spasm/pain when she has been more active.  · The patient does have some occasional aggravation of her chronic back pain and does use tramadol 50 mg every 8 hours as needed  · Patient's pain is stable.  She does continue to use tramadol 50 mg every 8-12 hours as needed which has been effective.  The patient does have chronic difficulty with pain involving her low back, left shoulder.  Patient notes that her pain does wax and wane.  Physical therapy did help.  See above discussion regarding recent development of right posterior chest wall pain and right upper abdomen pain.  5. Chemotherapy-induced diarrhea with subsequent C. difficile colitis in the setting of previous ulcerative colitis and then identification of enteropathogenic E. coli:  · Patient with reported history of ulcerative colitis, continues on mesalamine.  · The patient developed initial diarrhea related to Xeloda at regular dosing.  · Symptoms improved on reduced dose Xeloda  · Flare of symptoms in October 2018 with apparent finding of C. difficile colitis by GI, treated with course of oral vancomycin with improvement in symptoms.  · Patient notes minimal intermittent diarrhea/loose stools on Xeloda requiring occasional dosing of Imodium.    · Patient required hospitalization 5/29 - 6/1/2022 with presumed viral gastroenteritis that was exacerbated by Xeloda.  Xeloda held briefly, resumed on 6/6/2022.  · Patient's  developed C. difficile colitis and patient was experiencing increase in diarrhea.  Stool  studies performed 8/4/22 negative C. difficile however GI PCR was positive for enteropathogenic E. coli.  Given patient's symptoms and immunocompromise status it was elected to treat her with azithromycin 500 mg daily x3 days beginning 8/5/2022  · Patient reports that diarrhea resolved.  6. Traumatic left tibia/fibular fracture:  · Status post ORIF 12/6/17  · Specimen was sent for pathologic review, negative for evidence of malignancy  7. Hypercalcemia:  · Suspect hypercalcemia of malignancy, calcium in  10-11 range previously.  · Calcium normalized following initiation of monthly Xgeva on 1/23/18.  8. Chemotherapy-induced mucositis:  · Patient had a minimal degree of mucositis with cycle 2.  The patient has magic mouthwash to use as needed.  No subsequent mucositis.  9. Recurrent UTI, bladder wall thickening on CT:  · Patient had an enterococcal UTI on 3/2/18 sensitive to nitrofurantoin and received treatment, unclear how long.  · Recurrent UTI 3/20/18 with urine culture growing Klebsiella, initially treated with nitrofurantoin, transitioned to Levaquin.  · CT 4/4/18 with diffuse bladder wall thickening with increased nodular thickening at the left base.  Referral to urogynecology Dr. May Johnson.  She was placed on a prophylactic dose of trimethoprim 100 mg daily, bladder wall thickening felt to be related to recent recurrent urinary tract infections.  · Patient with urinary symptoms, treated with course of Macrobid at the end of December 2018, urine culture however was negative 12/31/18.  · Patient was found to have Klebsiella UTI 7/29/2019 which was successfully treated with Macrobid with complete resolution of symptoms.  · CT 8/10/2021 with diffuse bladder wall thickening new from 5/17/2021 (however seen on multiple prior scans).    · UTI on 10/18/2021 with E. coli greater than 100,000 colonies that was pansensitive, treated with Macrobid x7 days  · With ongoing/recurrent UTIs patient was seen by urogynecology  and initiated suppressive therapy with trimethoprim 100 mg daily in December 2021.  She has not experienced any further urinary tract infections.  · CT abdomen pelvis 3/28/2022 does show diffuse thickening of urinary bladder as has been seen on prior studies indicative of cystitis.  · Patient notes that she did stop taking trimethoprim on a daily basis.  She did follow-up recently with urogynecology, aware that she is off of suppressive antibiotics.  Fortunately however she has not experienced any recurrent UTIs recently.  10.   Mobility:  · The patient underwent an intensive course of rehabilitation at [facility].  She graduated from her outpatient course November 2018.  · Overall the patient has improved dramatically in terms of mobility,   · Patient has been continuing to walk for exercise on a regular basis.  She does note new braces that she obtained after being referred by Dr. Bermudez to a new brace facility and reports that the current braces are functioning much better.  11.  Depression:  · The patient is continuing follow-up in the supportive oncology clinic and is currently continuing on Cymbalta 30 mg twice daily as well as gabapentin 300 mg at dinner and 300 mg nightly, temazepam 15 mg nightly as needed, Provigil 100 mg daily.  · Patient does continue to attend support groups at [employer].  · The patient does continue routine follow-up in supportive oncology clinic.    · Patient does have multiple stressors with her 's malignancy and chemotherapy as well as her autistic son who is living with them at home.  · Patient continues to have difficulty with stress and anxiety.  She is scheduled to follow-up with supportive oncology on 1/11/2023.  12. 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  Symptoms did improve and she  continued on the same dose.    · Patient was referred to dermatology and has been continuing on triamcinolone 0.1% cream used for 1 week on followed by 1 week off which led to some further improvement.   · Progressive palmar erythema with development of sclerodactyly and effect on dexterity.  Addition of urea-based cream 3 times daily.  · Patient with continued difficulty regarding erythema of her hands that extended onto the dorsal aspect and was causing contractures/sclerodactyly in her fingers affecting her dexterity.  In July 2021, held Xeloda for an additional week and then reduced dose from 1500 mg twice daily down to 1000 mg a.m. and 1500 mg p.m. and continued on a 7-day on followed by 7-day off schedule.  · With reduction in Xeloda dose, slight improvement in erythema involving dorsal aspect of the hands and slight decrease in sclerodactyly  · Patient was experiencing severe hand-foot syndrome related to Xeloda having developed skin peeling, sclerodactyly with limited use of her hands.  On 3/31/2022, Xeloda was held to allow for recovery.  Patient resumed Xeloda on 4/18/2022.  · Patient developed worsening sclerodactyly affecting dexterity that required Xeloda to again be briefly held for 2 weeks from 10/3 - 10/17/2022.  Treatment resumed at prior dose after improvement in symptoms.  · Patient continues to use emollient cream frequently (currently 5 times daily) and topical triamcinolone 0.1% twice daily intermittently (2 weeks on followed by 2 weeks off as instructed by dermatology).  Symptoms today are improved in the palms of the hands with decrease in erythema and peeling.  The erythema on the dorsal aspect of the hands is fairly stable.  Sclerodactyly from her skin changes is stable to improved with mild to moderate effect on her dexterity.  13. Evidence of steatosis on scans with mild intermittent elevated liver function studies:  · Liver function studies increased 8/20/19 with ALT 98, AST 70, normal  total bilirubin.  · Negative viral hepatitis A, B, and C panel 8/23/18  · Likely related to hepatic steatosis.   · Subsequent improvement in LFTs  · LFTs today are normal  14. Chemotherapy induced leukopenia:  · WBC today  is stable at 4.91  15. GERD, question of celiac disease:  · Patient with significant history of reflux  · Patient currently receiving Protonix 40 mg daily daily and Carafate 1 g 4 times daily as needed  · Patient required hospitalization 5/29 - 6/1/2022 with presumed viral gastroenteritis that was exacerbated by Xeloda.    · EGD performed 5/31/2022 during hospitalization with biopsy that showed focal blunting of villi and atrophy with slight increase in intraepithelial lymphocytes.  Changes were minimal but recommended correlation with serology to exclude celiac disease.  · Celiac serology 8/8/2022 negative  · Patient previously developed worsening reflux symptoms, temporarily increase Protonix to 40 mg twice daily and we did add Carafate 1 g 4 times daily.    · Today she reports improved symptoms.  She is taking Protonix 40 mg once daily, is not taking Carafate.  16. Insomnia:  · Patient with prior paradoxical reaction to Benadryl  · Improved previously on temazepam 15 mg nightly as needed.   · Patient noted subsequently that temazepam was having no effect.   · Symptoms currently stable on gabapentin 300 mg at dinner and 300 mg nightly as well as temazepam 15 mg nightly as needed  17. Health maintenance:  · Patient notes that she has a history of colon polyps as well as ulcerative colitis and was due for a follow-up colonoscopy on 9/12/2019.  We did discuss there is no necessity to pursue colonoscopy in the setting of her metastatic breast cancer.    · The patient did undergo colonoscopy on 2/7/2020 with findings of muscular hypertrophy and diverticulosis.   · Patient did wish to proceed with further colonoscopic evaluation, performed on 12/20/2022 with poor prep.  A repeat is scheduled for  2/2/2023.  18. Bilateral shoulder pain:  · Patient was evaluated by Dr Forrester.  She has experienced difficulty over a long time frame with right shoulder although she has not complained of this in prior visits to our office.  She reported difficulty with abduction.    · The patient did undergo MRI of her right shoulder on 11/21/2019 at an outside facility showing multiple abnormalities including supraspinatus tendinosis, labral tear but no evidence of metastatic disease.  · Patient developed pain in the left shoulder, was seen by orthopedics and underwent steroid injection with some improvement.  Right shoulder stable.  Patient did undergo physical therapy with some improvement.  She continues to use tramadol 50 mg every 8-12 hours as needed.  · Note that current CT 10/20/2022 made notation of left shoulder probable bursitis.  We discussed that she should return to see orthopedics and discuss further steroid injection.  19. Ocular changes in part related to Xeloda:  · Patient experienced a mild degree of blurred vision as well as burning and pruritus.  · She was seen by her ophthalmologist and was placed on xiidra ophthalmic drops which have helped.  · Likely both issues are to some extent related to Xeloda.  · Symptoms did worsen and patient was seen by a new ophthalmologist at [facility].  She is now using an ocular lubricant at night in addition to artificial tears which have helped.  Symptoms currently stable to improved  20. Elevated glucose:  · It is noted the patient's glucose at the last few visits has been in the high 100 range, postprandial.  · She has had a hemoglobin A1c that has been in the high 5-6 range in the past, on 5/1/2019 at 5.7  · Hemoglobin A1c was last checked in 11/12/2021 at 5.8  21. Possible loosening of pedicle screw at T3:  · CT cervical spine 5/17/2021 showed loosening of the left pedicle screw at T3.  Patient referred to orthopedics and was seen by Dr. Nayak who felt  that there were no concerning findings on the scan  22. Iron deficiency anemia  · Labs on 5/2/2022 with hemoglobin 10.8.  Additional labs with iron 48, ferritin 21.2, iron saturation 11%, TIBC 4 and 32, folate greater than 20, B12 greater than 2000.    · Attempted treatment with oral iron daily however patient was intolerant  · Patient subsequently received Venofer 300 mg weekly x4 beginning 6/6/2022 and completed on 6/27/2022  · Subsequent iron studies on 7/11/2022 showed improvement with iron 62, ferritin 345, iron saturation 18%, TIBC 336.  Hemoglobin had improved up to 12.7 at that time.  · Repeat iron studies on 10/3/2022 showed iron replete status with hemoglobin 13.7, iron 121, ferritin 208.7, iron saturation 31%, TIBC 392.  · Hemoglobin currently normal at 14.7     Plan:  1. Continue palliative single agent Xeloda 1000 mg a.m., 1500 mg in the p.m. 7 days on followed by 7 days off   2. The patient will be due next for monthly Xgeva in 2 weeks  3. Continue Eliquis 5 mg twice daily  4. The patient will continue frequent use of emollient cream currently 5 times daily and continue periodic triamcinolone cream 0.1% twice daily (provided by dermatology) 2 weeks on followed by 2 weeks off as prescribed  5. Continue Protonix 40 mg daily  6. Continue ocular lubricating gel nightly per ophthalmology  7. Continue Provigil 100 mg daily and Cymbalta 30 mg twice daily, gabapentin 300 mg at dinner and 300 mg at night per supportive oncology clinic.  Patient continues routine follow-up with supportive oncology due to be seen on 1/11/2023  8. Continue temazepam 15 mg nightly.  9. In 2 weeks CBC, CMP, magnesium, phosphorus and Xgeva  10. In 3 weeks CT chest abdomen pelvis and bone scan  11. In 4 weeks MD visit with CBC, CMP.  We will review scan results.     Patient continuing on high risk medication requiring intensive monitoring.       I did spend 40 minutes in total time caring for this patient today, time spent in review  of records, face-to-face consultation, coordination of care, placement of orders, completion of documentation.

## 2023-01-06 RX ORDER — DULOXETIN HYDROCHLORIDE 30 MG/1
CAPSULE, DELAYED RELEASE ORAL
Qty: 180 CAPSULE | Refills: 0 | Status: SHIPPED | OUTPATIENT
Start: 2023-01-06 | End: 2023-02-23 | Stop reason: SDUPTHER

## 2023-01-09 ENCOUNTER — OFFICE VISIT (OUTPATIENT)
Dept: ONCOLOGY | Facility: CLINIC | Age: 63
End: 2023-01-09
Payer: MEDICARE

## 2023-01-09 ENCOUNTER — LAB (OUTPATIENT)
Dept: OTHER | Facility: HOSPITAL | Age: 63
End: 2023-01-09
Payer: MEDICARE

## 2023-01-09 ENCOUNTER — APPOINTMENT (OUTPATIENT)
Dept: ONCOLOGY | Facility: HOSPITAL | Age: 63
End: 2023-01-09
Payer: MEDICARE

## 2023-01-09 VITALS
TEMPERATURE: 96.8 F | WEIGHT: 180.9 LBS | DIASTOLIC BLOOD PRESSURE: 71 MMHG | SYSTOLIC BLOOD PRESSURE: 123 MMHG | HEIGHT: 62 IN | OXYGEN SATURATION: 96 % | HEART RATE: 99 BPM | BODY MASS INDEX: 33.29 KG/M2 | RESPIRATION RATE: 16 BRPM

## 2023-01-09 DIAGNOSIS — C50.811 MALIGNANT NEOPLASM OF OVERLAPPING SITES OF RIGHT BREAST IN FEMALE, ESTROGEN RECEPTOR NEGATIVE: Primary | ICD-10-CM

## 2023-01-09 DIAGNOSIS — Z17.1 MALIGNANT NEOPLASM OF OVERLAPPING SITES OF RIGHT BREAST IN FEMALE, ESTROGEN RECEPTOR NEGATIVE: Primary | ICD-10-CM

## 2023-01-09 DIAGNOSIS — Z17.1 MALIGNANT NEOPLASM OF OVERLAPPING SITES OF RIGHT BREAST IN FEMALE, ESTROGEN RECEPTOR NEGATIVE: ICD-10-CM

## 2023-01-09 DIAGNOSIS — C50.811 MALIGNANT NEOPLASM OF OVERLAPPING SITES OF RIGHT BREAST IN FEMALE, ESTROGEN RECEPTOR NEGATIVE: ICD-10-CM

## 2023-01-09 PROBLEM — Z86.0100 HISTORY OF COLON POLYPS: Status: ACTIVE | Noted: 2023-01-09

## 2023-01-09 PROBLEM — Z86.010 HISTORY OF COLON POLYPS: Status: ACTIVE | Noted: 2023-01-09

## 2023-01-09 LAB
ALBUMIN SERPL-MCNC: 4.4 G/DL (ref 3.5–5.2)
ALBUMIN/GLOB SERPL: 1.6 G/DL
ALP SERPL-CCNC: 63 U/L (ref 39–117)
ALT SERPL W P-5'-P-CCNC: 22 U/L (ref 1–33)
ANION GAP SERPL CALCULATED.3IONS-SCNC: 7.7 MMOL/L (ref 5–15)
AST SERPL-CCNC: 22 U/L (ref 1–32)
BASOPHILS # BLD AUTO: 0.04 10*3/MM3 (ref 0–0.2)
BASOPHILS NFR BLD AUTO: 0.8 % (ref 0–1.5)
BILIRUB SERPL-MCNC: 0.5 MG/DL (ref 0–1.2)
BUN SERPL-MCNC: 23 MG/DL (ref 8–23)
BUN/CREAT SERPL: 26.4 (ref 7–25)
CALCIUM SPEC-SCNC: 10.5 MG/DL (ref 8.6–10.5)
CHLORIDE SERPL-SCNC: 103 MMOL/L (ref 98–107)
CO2 SERPL-SCNC: 32.3 MMOL/L (ref 22–29)
CREAT SERPL-MCNC: 0.87 MG/DL (ref 0.57–1)
DEPRECATED RDW RBC AUTO: 58 FL (ref 37–54)
EGFRCR SERPLBLD CKD-EPI 2021: 75.4 ML/MIN/1.73
EOSINOPHIL # BLD AUTO: 0.12 10*3/MM3 (ref 0–0.4)
EOSINOPHIL NFR BLD AUTO: 2.4 % (ref 0.3–6.2)
ERYTHROCYTE [DISTWIDTH] IN BLOOD BY AUTOMATED COUNT: 15.3 % (ref 12.3–15.4)
GLOBULIN UR ELPH-MCNC: 2.8 GM/DL
GLUCOSE SERPL-MCNC: 124 MG/DL (ref 65–99)
HCT VFR BLD AUTO: 44.7 % (ref 34–46.6)
HGB BLD-MCNC: 14.7 G/DL (ref 12–15.9)
IMM GRANULOCYTES # BLD AUTO: 0.01 10*3/MM3 (ref 0–0.05)
IMM GRANULOCYTES NFR BLD AUTO: 0.2 % (ref 0–0.5)
LYMPHOCYTES # BLD AUTO: 1.63 10*3/MM3 (ref 0.7–3.1)
LYMPHOCYTES NFR BLD AUTO: 33.2 % (ref 19.6–45.3)
MCH RBC QN AUTO: 33.8 PG (ref 26.6–33)
MCHC RBC AUTO-ENTMCNC: 32.9 G/DL (ref 31.5–35.7)
MCV RBC AUTO: 102.8 FL (ref 79–97)
MONOCYTES # BLD AUTO: 0.3 10*3/MM3 (ref 0.1–0.9)
MONOCYTES NFR BLD AUTO: 6.1 % (ref 5–12)
NEUTROPHILS NFR BLD AUTO: 2.81 10*3/MM3 (ref 1.7–7)
NEUTROPHILS NFR BLD AUTO: 57.3 % (ref 42.7–76)
NRBC BLD AUTO-RTO: 0 /100 WBC (ref 0–0.2)
PLATELET # BLD AUTO: 237 10*3/MM3 (ref 140–450)
PMV BLD AUTO: 10.3 FL (ref 6–12)
POTASSIUM SERPL-SCNC: 4.7 MMOL/L (ref 3.5–5.2)
PROT SERPL-MCNC: 7.2 G/DL (ref 6–8.5)
RBC # BLD AUTO: 4.35 10*6/MM3 (ref 3.77–5.28)
SODIUM SERPL-SCNC: 143 MMOL/L (ref 136–145)
WBC NRBC COR # BLD: 4.91 10*3/MM3 (ref 3.4–10.8)

## 2023-01-09 PROCEDURE — 36415 COLL VENOUS BLD VENIPUNCTURE: CPT

## 2023-01-09 PROCEDURE — 80053 COMPREHEN METABOLIC PANEL: CPT | Performed by: INTERNAL MEDICINE

## 2023-01-09 PROCEDURE — 85025 COMPLETE CBC W/AUTO DIFF WBC: CPT | Performed by: INTERNAL MEDICINE

## 2023-01-09 PROCEDURE — 99215 OFFICE O/P EST HI 40 MIN: CPT | Performed by: INTERNAL MEDICINE

## 2023-01-11 ENCOUNTER — OFFICE VISIT (OUTPATIENT)
Dept: PSYCHIATRY | Facility: HOSPITAL | Age: 63
End: 2023-01-11
Payer: MEDICARE

## 2023-01-11 DIAGNOSIS — F33.1 MAJOR DEPRESSIVE DISORDER, RECURRENT EPISODE, MODERATE: ICD-10-CM

## 2023-01-11 DIAGNOSIS — F41.1 GENERALIZED ANXIETY DISORDER: ICD-10-CM

## 2023-01-11 DIAGNOSIS — R53.0 NEOPLASTIC MALIGNANT RELATED FATIGUE: Primary | ICD-10-CM

## 2023-01-11 DIAGNOSIS — C50.919 METASTATIC BREAST CANCER: ICD-10-CM

## 2023-01-11 PROCEDURE — 99214 OFFICE O/P EST MOD 30 MIN: CPT | Performed by: NURSE PRACTITIONER

## 2023-01-11 NOTE — PROGRESS NOTES
[facility]cology Services  In Person Session    Subjective  Patient ID: Martha Davey is a 62 y.o. female is seen face to face in the [facility] ([facility].    CC: Overwhelmed    HPI:   Interval history reviewed; pt continues to report stability on Xeloda for metastatic breast cancer. Reports some increase in pain with moving up of scans to assist. Identifies stress related to any metastatic cancer diagnosis commercials, ruminative worry regarding recurrence. Mental health sx are relatively stable; continues to find benefit to current med regimen of cymbalta, gabapentin, provigil. Sleep is appropriate, assisted by CBD gummies on some nights. Pt continues to identify fatigue as primary SE. Reports reduced walking due to weather, with goals of using exercise bike. Appreciates ability to relax in lazy boy. Continues to see therapist on monthly basis. Is working to prioritize personal time, regularly seeing friends a couple times a month. Interpersonal sensitivity persists at times, although with improved ability to allow time and space.    Pt continue to identify various social stressors, including 's health complications, son with significant needs, and new neighbor with additional needs. Appreciates sense of purpose and accomplishment in ability to assist others, while acknowledging this can be stressful and requires energy. Pt continues to identify persistent anxiety, ruminative worry regarding adult children. Pt finds this to be stable and appreciates regular interaction with therapist. Working to identify and hold boundaries. Continues to identify tendency to enmesh with the problems of others.    Objective   Mental Status Exam  Appearance:  clean and casually dressed, appropriate  Attitude toward clinician:  cooperative and agreeable   Speech:    Rate:  regular rate and rhythm   Volume:  normal  Motor:  no abnormal movements present  Mood:  Anxious, stressed  Affect:  anxious and  mood congruent  Thought Processes:  linear, logical, and goal directed  Thought Content:  normal  Suicidal Thoughts:  absent  Homicidal Thoughts:  absent  Perceptual Disturbance: no perceptual disturbance  Attention and Concentration:  good  Insight and Judgement:  fair  Memory:  memory appears to be intact    Review of Systems   Constitutional: Positive for activity change and fatigue.   Psychiatric/Behavioral: Positive for sleep disturbance. Negative for suicidal ideas. The patient is nervous/anxious.      Medications Reviewed:  Xanax 0.25 mg - only surrounding scans  Cymbalta 30 mg bid  Modafinil 100 mg q am    Diagnoses and all orders for this visit:    1. Neoplastic malignant related fatigue (Primary)    2. Major depressive disorder, recurrent episode, moderate (HCC)    3. Generalized anxiety disorder    4. Metastatic breast cancer (HCC)      Plan of Care  Continue cymbalta and modafinil as written. Xanax PRN surrounding scans. Pt is not taking temazepam anymore. No refills needed today.  Recommend continued engagement in counseling.  Will plan for follow up alongside , both with cancer diagnoses, on every other apt basis.  Provided pt with information to schedule massage for pain and anxiety.  Follow up scheduled in 6 weeks. Pt aware sooner apt available if needed surrounding upcoming scans.    I spent 33 minutes caring for Martha on this date of service. This time includes time spent by me in the following activities: preparing for the visit, obtaining and/or reviewing a separately obtained history, performing a medically appropriate examination and/or evaluation, counseling and educating the patient/family/caregiver and documenting information in the medical record.

## 2023-01-19 ENCOUNTER — TELEPHONE (OUTPATIENT)
Dept: ONCOLOGY | Facility: CLINIC | Age: 63
End: 2023-01-19
Payer: MEDICARE

## 2023-01-19 ENCOUNTER — SPECIALTY PHARMACY (OUTPATIENT)
Dept: PHARMACY | Facility: HOSPITAL | Age: 63
End: 2023-01-19
Payer: MEDICARE

## 2023-01-19 RX ORDER — TRAMADOL HYDROCHLORIDE 50 MG/1
TABLET ORAL
Qty: 90 TABLET | Refills: 0 | Status: SHIPPED | OUTPATIENT
Start: 2023-01-19 | End: 2023-02-22 | Stop reason: SDUPTHER

## 2023-01-19 NOTE — PROGRESS NOTES
MT Refill Coordination- capecitabine    Ms. Davey requests a call back for refill next week. Refill coordination task moved to next week.     Thanks,   Lubna Gupta, DimitriosD

## 2023-01-19 NOTE — TELEPHONE ENCOUNTER
Returned call to patient with the information that her script has been signed and sent to her pharmacy.  She v/u.

## 2023-01-23 ENCOUNTER — LAB (OUTPATIENT)
Dept: OTHER | Facility: HOSPITAL | Age: 63
End: 2023-01-23
Payer: MEDICARE

## 2023-01-23 ENCOUNTER — INFUSION (OUTPATIENT)
Dept: ONCOLOGY | Facility: HOSPITAL | Age: 63
End: 2023-01-23
Payer: MEDICARE

## 2023-01-23 DIAGNOSIS — C50.811 MALIGNANT NEOPLASM OF OVERLAPPING SITES OF RIGHT BREAST IN FEMALE, ESTROGEN RECEPTOR NEGATIVE: ICD-10-CM

## 2023-01-23 DIAGNOSIS — Z17.1 MALIGNANT NEOPLASM OF OVERLAPPING SITES OF RIGHT BREAST IN FEMALE, ESTROGEN RECEPTOR NEGATIVE: ICD-10-CM

## 2023-01-23 DIAGNOSIS — C50.811 MALIGNANT NEOPLASM OF OVERLAPPING SITES OF RIGHT BREAST IN FEMALE, ESTROGEN RECEPTOR NEGATIVE: Primary | ICD-10-CM

## 2023-01-23 DIAGNOSIS — Z17.1 MALIGNANT NEOPLASM OF OVERLAPPING SITES OF RIGHT BREAST IN FEMALE, ESTROGEN RECEPTOR NEGATIVE: Primary | ICD-10-CM

## 2023-01-23 LAB
ALBUMIN SERPL-MCNC: 4.2 G/DL (ref 3.5–5.2)
ALBUMIN/GLOB SERPL: 1.6 G/DL
ALP SERPL-CCNC: 55 U/L (ref 39–117)
ALT SERPL W P-5'-P-CCNC: 22 U/L (ref 1–33)
ANION GAP SERPL CALCULATED.3IONS-SCNC: 6.2 MMOL/L (ref 5–15)
AST SERPL-CCNC: 27 U/L (ref 1–32)
BASOPHILS # BLD AUTO: 0.03 10*3/MM3 (ref 0–0.2)
BASOPHILS NFR BLD AUTO: 0.7 % (ref 0–1.5)
BILIRUB SERPL-MCNC: 0.4 MG/DL (ref 0–1.2)
BUN SERPL-MCNC: 21 MG/DL (ref 8–23)
BUN/CREAT SERPL: 24.1 (ref 7–25)
CALCIUM SPEC-SCNC: 9.4 MG/DL (ref 8.6–10.5)
CHLORIDE SERPL-SCNC: 103 MMOL/L (ref 98–107)
CO2 SERPL-SCNC: 30.8 MMOL/L (ref 22–29)
CREAT SERPL-MCNC: 0.87 MG/DL (ref 0.57–1)
DEPRECATED RDW RBC AUTO: 58.8 FL (ref 37–54)
EGFRCR SERPLBLD CKD-EPI 2021: 75.4 ML/MIN/1.73
EOSINOPHIL # BLD AUTO: 0.06 10*3/MM3 (ref 0–0.4)
EOSINOPHIL NFR BLD AUTO: 1.3 % (ref 0.3–6.2)
ERYTHROCYTE [DISTWIDTH] IN BLOOD BY AUTOMATED COUNT: 15.2 % (ref 12.3–15.4)
GLOBULIN UR ELPH-MCNC: 2.6 GM/DL
GLUCOSE SERPL-MCNC: 119 MG/DL (ref 65–99)
HCT VFR BLD AUTO: 40.2 % (ref 34–46.6)
HGB BLD-MCNC: 13.2 G/DL (ref 12–15.9)
IMM GRANULOCYTES # BLD AUTO: 0.01 10*3/MM3 (ref 0–0.05)
IMM GRANULOCYTES NFR BLD AUTO: 0.2 % (ref 0–0.5)
LYMPHOCYTES # BLD AUTO: 1.06 10*3/MM3 (ref 0.7–3.1)
LYMPHOCYTES NFR BLD AUTO: 23.4 % (ref 19.6–45.3)
MAGNESIUM SERPL-MCNC: 1.8 MG/DL (ref 1.6–2.4)
MCH RBC QN AUTO: 34.3 PG (ref 26.6–33)
MCHC RBC AUTO-ENTMCNC: 32.8 G/DL (ref 31.5–35.7)
MCV RBC AUTO: 104.4 FL (ref 79–97)
MONOCYTES # BLD AUTO: 0.41 10*3/MM3 (ref 0.1–0.9)
MONOCYTES NFR BLD AUTO: 9.1 % (ref 5–12)
NEUTROPHILS NFR BLD AUTO: 2.96 10*3/MM3 (ref 1.7–7)
NEUTROPHILS NFR BLD AUTO: 65.3 % (ref 42.7–76)
NRBC BLD AUTO-RTO: 0 /100 WBC (ref 0–0.2)
PHOSPHATE SERPL-MCNC: 2.6 MG/DL (ref 2.5–4.5)
PLATELET # BLD AUTO: 209 10*3/MM3 (ref 140–450)
PMV BLD AUTO: 11.1 FL (ref 6–12)
POTASSIUM SERPL-SCNC: 5.1 MMOL/L (ref 3.5–5.2)
PROT SERPL-MCNC: 6.8 G/DL (ref 6–8.5)
RBC # BLD AUTO: 3.85 10*6/MM3 (ref 3.77–5.28)
SODIUM SERPL-SCNC: 140 MMOL/L (ref 136–145)
WBC NRBC COR # BLD: 4.53 10*3/MM3 (ref 3.4–10.8)

## 2023-01-23 PROCEDURE — 96372 THER/PROPH/DIAG INJ SC/IM: CPT

## 2023-01-23 PROCEDURE — 85025 COMPLETE CBC W/AUTO DIFF WBC: CPT | Performed by: INTERNAL MEDICINE

## 2023-01-23 PROCEDURE — 25010000002 DENOSUMAB 120 MG/1.7ML SOLUTION: Performed by: INTERNAL MEDICINE

## 2023-01-23 PROCEDURE — 36415 COLL VENOUS BLD VENIPUNCTURE: CPT

## 2023-01-23 PROCEDURE — 84100 ASSAY OF PHOSPHORUS: CPT | Performed by: INTERNAL MEDICINE

## 2023-01-23 PROCEDURE — 80053 COMPREHEN METABOLIC PANEL: CPT | Performed by: INTERNAL MEDICINE

## 2023-01-23 PROCEDURE — 83735 ASSAY OF MAGNESIUM: CPT | Performed by: INTERNAL MEDICINE

## 2023-01-23 RX ADMIN — DENOSUMAB 120 MG: 120 INJECTION SUBCUTANEOUS at 12:06

## 2023-01-23 NOTE — NURSING NOTE
Injection  Xgeva Injection performed in [place] by Elyssa Valdez RN. Patient tolerated the procedure well without complications.  01/23/23   Elyssa Valdez RN

## 2023-01-26 ENCOUNTER — SPECIALTY PHARMACY (OUTPATIENT)
Dept: PHARMACY | Facility: HOSPITAL | Age: 63
End: 2023-01-26
Payer: MEDICARE

## 2023-01-26 NOTE — PROGRESS NOTES
Specialty Pharmacy Refill Coordination Note     Martha is a 62 y.o. female contacted today regarding refills of  Xeloda specialty medication(s).    Reviewed and verified with patient:       Specialty medication(s) and dose(s) confirmed: yes    Refill Questions    Flowsheet Row Most Recent Value   Changes to allergies? No   Changes to medications? No   New conditions since last clinic visit No   Unplanned office visit, urgent care, ED, or hospital admission in the last 4 weeks  No   How does patient/caregiver feel medication is working? Good   Financial problems or insurance changes  No   Since the previous refill, were any specialty medication doses or scheduled injections missed or delayed?  Yes   If yes, please provide the amount 1 day   Why were doses missed? forgot   Does this patient require a clinical escalation to a pharmacist? No          Delivery Questions    Flowsheet Row Most Recent Value   Delivery method [employer]  [[employer] priority sig required ship 2/2 deliver 2/3]   Delivery address correct? Yes   Preferred delivery time? AM   Number of medications in delivery 1   Medication being filled and delivered Xeloda   Doses left of specialty medications 1 week- start day next Monday   Is there any medication that is due not being filled? No   Supplies needed? No supplies needed   Cooler needed? No   Do any medications need mixed or dated? No   Copay form of payment Payment plan already set up   Questions or concerns for the pharmacist? No   Are any medications first time fills? No                 Follow-up: 3 week(s)     Ami Hudson  Specialty Pharmacy Technician

## 2023-01-30 ENCOUNTER — TELEPHONE (OUTPATIENT)
Dept: ONCOLOGY | Facility: CLINIC | Age: 63
End: 2023-01-30

## 2023-01-30 NOTE — TELEPHONE ENCOUNTER
Caller: Martha Davey    Relationship to patient: Self    Best call back number: [phone]    Chief complaint: R/S    Type of visit: LAB AND FOLLOW UP    Requested date: AFTER SCANS WHICH IS 3-3      If rescheduling, when is the original appointment: 2-8    Additional notes:SHE ALSO NEEDS AN APPT FOR HER BONE SHOT

## 2023-02-01 RX ORDER — TEMAZEPAM 15 MG/1
15 CAPSULE ORAL NIGHTLY PRN
Qty: 90 CAPSULE | OUTPATIENT
Start: 2023-02-01

## 2023-02-09 ENCOUNTER — TELEMEDICINE (OUTPATIENT)
Dept: PSYCHIATRY | Facility: CLINIC | Age: 63
End: 2023-02-09
Payer: MEDICARE

## 2023-02-09 DIAGNOSIS — F33.1 MAJOR DEPRESSIVE DISORDER, RECURRENT EPISODE, MODERATE: ICD-10-CM

## 2023-02-09 DIAGNOSIS — F41.1 GENERALIZED ANXIETY DISORDER: Primary | ICD-10-CM

## 2023-02-09 PROCEDURE — 90832 PSYTX W PT 30 MINUTES: CPT | Performed by: COUNSELOR

## 2023-02-09 NOTE — PROGRESS NOTES
Date: February 9, 2023  Time In: 0830  Time Out: 0900  This provider is located at home address for [facility] (through ), [address], [place] [address] using a secure [facility] Video Visit through [email or web address]. Patient is being seen remotely via telehealth at home address in [place] and stated they are in a secure environment for this session. The patient's condition being diagnosed/treated is appropriate for telemedicine. The provider identified herself as well as her credentials. The patient, and/or patients guardian, consent to be seen remotely, and when consent is given they understand that the consent allows for patient identifiable information to be sent to a third party as needed. They may refuse to be seen remotely at any time. The electronic data is encrypted and password protected, and the patient and/or guardian has been advised of the potential risks to privacy not withstanding such measures.     You have chosen to receive care through a telehealth visit.  Do you consent to use a video/audio connection for your medical care today? Yes    Due to connection issues this session was completed via telephone after multiple failed attempts were made.     PROGRESS NOTE  Data:  Martha Davey is a 62 y.o. female who presents today for follow up    Chief Complaint: depression     History of Present Illness: Pt discussed 's health and need to be with Pt causing it to be difficult for Pt to receive any time for herself by herself. Pt reports that  will call her mean or rude when his feelings are hurt. Pt reports that she is mourning her  and trying to adapt to this new person; explaining her 's health decline and impact on cognitive ability. Pt reports that she feels overwhelmed with her household; assisting her sons, assisting her , and caring for her mother. Pt reports that her mother is hospitalized at this time and  that this was an event that increased stress and anxiety. Pt reports feeling drained and struggling with energy.       Clinical Maneuvering/Intervention:    (Scales based on 0 - 10 with 10 being the worst)  Depression: 5 Anxiety: 5       Assisted patient in processing above session content; acknowledged and normalized patient’s thoughts, feelings, and concerns.  Rationalized patient thought process regarding recent stressors and life events. Discussed triggers associated with patient's emotions. Also discussed coping skills for patient to implement such as self care practices and encouraged alone time even if this meant driving alone in the car or going outside to read a book.    Allowed patient to freely discuss issues without interruption or judgment. Provided safe, confidential environment to facilitate the development of positive therapeutic relationship and encourage open, honest communication. Assisted patient in identifying risk factors which would indicate the need for higher level of care including thoughts to harm self or others and/or self-harming behavior and encouraged patient to contact this office, call 911, or present to the nearest emergency room should any of these events occur. Discussed crisis intervention services and means to access. Patient adamantly and convincingly denies current suicidal or homicidal ideation or perceptual disturbance.    Assessment:   Assessment   Patient appears to maintain relative stability as compared to their baseline.  However, patient continues to struggle with anxiety and depression which continues to cause impairment in important areas of functioning.  A result, they can be reasonably expected to continue to benefit from treatment and would likely be at increased risk for decompensation otherwise.    Mental Status Exam:   Hygiene:   good  Cooperation:  Cooperative  Eye Contact:  Good  Psychomotor Behavior:  Appropriate  Affect:  Appropriate  Mood: normal  Speech:   Normal  Thought Process:  Linear  Thought Content:  Normal  Suicidal:  None  Homicidal:  None  Hallucinations:  None  Delusion:  None  Memory:  Intact  Orientation:  Person, Place, Time and Situation  Reliability:  fair  Insight:  Fair  Judgement:  Fair  Impulse Control:  Fair  Physical/Medical Issues:  No        Patient's Support Network Includes:      Functional Status: Mild impairment     Progress toward goal: Not at goal    Prognosis: Fair with Ongoing Treatment          Plan:    Patient will continue in individual outpatient therapy with focus on improved functioning and coping skills, maintaining stability, and avoiding decompensation and the need for higher level of care.    Patient will adhere to medication regimen as prescribed and report any side effects. Patient will contact this office, call 911 or present to the nearest emergency room should suicidal or homicidal ideations occur. Provide Cognitive Behavioral Therapy and Solution Focused Therapy to improve functioning, maintain stability, and avoid decompensation and the need for higher level of care.     Return in about 3 weeks, or earlier if symptoms worsen or fail to improve.           VISIT DIAGNOSIS:     ICD-10-CM ICD-9-CM   1. Generalized anxiety disorder  F41.1 300.02   2. Major depressive disorder, recurrent episode, moderate (HCC)  F33.1 296.32        Diagnoses and all orders for this visit:    1. Generalized anxiety disorder (Primary)    2. Major depressive disorder, recurrent episode, moderate (HCC)           [facility] No Show Policy:  We understand unexpected circumstances arise; however, anytime you miss your appointment we are unable to provide you appropriate care.  In addition, each appointment missed could have been used to provide care for others.  We ask that you call at least 24 hours in advance to cancel or reschedule an appointment.  We would like to take this opportunity to remind you of our policy  stating patients who miss THREE or more appointments without cancelling or rescheduling 24 hours in advance of the appointment may be subject to cancellation of any further visits with our clinic and recommendation to seek in-person services/visits.    Please call [phone] to reschedule your appointment. If there are reasons that make it difficult for you to keep the appointments, please call and let us know how we can help.  Please understand that medication prescribing will not continue without seeing your provider.      [facility]'s No Show Policy reviewed with patient at today's visit. Patient verbalized understanding of this policy. Discussed with patient that in the event that there are three or more no show visits, it will be recommended that they pursue in-person services/visits as noncompliance with telehealth visits indicates that patient is not an appropriate candidate for telemedicine and would likely be more appropriate for in-person services/visits. Patient verbalizes understanding and is agreeable to this.        This document has been electronically signed by Sophia Barron LCSW  February 9, 2023 13:16 EST      Part of this note may be an electronic transcription/translation of spoken language to printed text using the Dragon Dictation System.

## 2023-02-14 RX ORDER — APIXABAN 5 MG/1
TABLET, FILM COATED ORAL
Qty: 180 TABLET | Refills: 3 | Status: SHIPPED | OUTPATIENT
Start: 2023-02-14

## 2023-02-22 RX ORDER — TEMAZEPAM 15 MG/1
15 CAPSULE ORAL NIGHTLY PRN
Qty: 30 CAPSULE | Refills: 0 | Status: SHIPPED | OUTPATIENT
Start: 2023-02-22 | End: 2023-03-29

## 2023-02-22 RX ORDER — TRAMADOL HYDROCHLORIDE 50 MG/1
50 TABLET ORAL EVERY 8 HOURS PRN
Qty: 90 TABLET | Refills: 0 | Status: SHIPPED | OUTPATIENT
Start: 2023-02-22 | End: 2023-04-06

## 2023-02-22 NOTE — TELEPHONE ENCOUNTER
----- Message from Zuri Dutton sent at 2/22/2023 12:14 PM EST -----  Regarding: medication issue  [facility] patient  Patient needs a new prescription for her Temazepam and Tramadol, please call her at [phone]. She tried to call it in but she is out of refills.    Carlos

## 2023-02-22 NOTE — TELEPHONE ENCOUNTER
Returned call to patient who is reporting that she needs a refill of Temazepam and Tramadol.  I explained that I would send to Dr. Hancock to sign.  She also is having a colonoscopy on Wednesday of next week.  She is due to restart her Capecitabine on Monday, but is concerned that she will be on clear liquids and may not tolerate the tablets.  She will delay starting the medication till after her colonoscopy.

## 2023-02-23 ENCOUNTER — OFFICE VISIT (OUTPATIENT)
Dept: PSYCHIATRY | Facility: HOSPITAL | Age: 63
End: 2023-02-23
Payer: MEDICARE

## 2023-02-23 DIAGNOSIS — F33.1 MAJOR DEPRESSIVE DISORDER, RECURRENT EPISODE, MODERATE: ICD-10-CM

## 2023-02-23 DIAGNOSIS — R53.0 NEOPLASTIC MALIGNANT RELATED FATIGUE: ICD-10-CM

## 2023-02-23 DIAGNOSIS — C50.919 METASTATIC BREAST CANCER: Primary | ICD-10-CM

## 2023-02-23 PROCEDURE — 99214 OFFICE O/P EST MOD 30 MIN: CPT | Performed by: NURSE PRACTITIONER

## 2023-02-23 RX ORDER — DULOXETIN HYDROCHLORIDE 30 MG/1
30 CAPSULE, DELAYED RELEASE ORAL 2 TIMES DAILY
Qty: 180 CAPSULE | Refills: 0 | Status: SHIPPED | OUTPATIENT
Start: 2023-02-23

## 2023-02-23 RX ORDER — MODAFINIL 200 MG/1
200 TABLET ORAL DAILY
Qty: 30 TABLET | Refills: 2 | Status: SHIPPED | OUTPATIENT
Start: 2023-02-23

## 2023-02-23 NOTE — PROGRESS NOTES
[facility] Services  In Person Session    Subjective  Patient ID: Martha Davey is a 62 y.o. female is seen face to face in the [facility] ([facility].    CC: depression, anxiety    HPI/ Interval History: Pt is seen alongside  regarding dual cancer diagnoses, adjusted life experience.   Pt reports improved mood, reduced anxiety alongside mutual improvement with self and . Continues to identify caregiver distress surrounding other's declining health, various responsibilities. Continues with some level of interpersonal sensitivity, working toward allowing self to set boundaries when needed. Physically, pt reports persistent pain on left side/ arm. Unfortunately missed massage in clinic, is looking to reschedule. Mourns forced radical acceptance, while appreciating current mgmt on non narcotics. Sleep is somewhat impacted due to this, added by not having temazepam available. States she is taking. Sleep schedule reviewed to be 12:30-2:30 AM to 9:30 AM, although very fragmented. Somewhat ambivalent regarding adjustment, appreciating alone time. Pt continues to interact regularly with therapist, somewhat concerned that frequency of visits, although continuing monthly at this time.     Exam: As above    Recent Labs Reviewed:  Lab on 01/23/2023   Component Date Value   • Glucose 01/23/2023 119 (H)    • BUN 01/23/2023 21    • Creatinine 01/23/2023 0.87    • Sodium 01/23/2023 140    • Potassium 01/23/2023 5.1    • Chloride 01/23/2023 103    • CO2 01/23/2023 30.8 (H)    • Calcium 01/23/2023 9.4    • Total Protein 01/23/2023 6.8    • Albumin 01/23/2023 4.2    • ALT (SGPT) 01/23/2023 22    • AST (SGOT) 01/23/2023 27    • Alkaline Phosphatase 01/23/2023 55    • Total Bilirubin 01/23/2023 0.4    • Globulin 01/23/2023 2.6    • A/G Ratio 01/23/2023 1.6    • BUN/Creatinine Ratio 01/23/2023 24.1    • Anion Gap 01/23/2023 6.2    • eGFR 01/23/2023 75.4    • Magnesium 01/23/2023 1.8    •  Phosphorus 01/23/2023 2.6    • WBC 01/23/2023 4.53    • RBC 01/23/2023 3.85    • Hemoglobin 01/23/2023 13.2    • Hematocrit 01/23/2023 40.2    • MCV 01/23/2023 104.4 (H)    • MCH 01/23/2023 34.3 (H)    • MCHC 01/23/2023 32.8    • RDW 01/23/2023 15.2    • RDW-SD 01/23/2023 58.8 (H)    • MPV 01/23/2023 11.1    • Platelets 01/23/2023 209    • Neutrophil % 01/23/2023 65.3    • Lymphocyte % 01/23/2023 23.4    • Monocyte % 01/23/2023 9.1    • Eosinophil % 01/23/2023 1.3    • Basophil % 01/23/2023 0.7    • Immature Grans % 01/23/2023 0.2    • Neutrophils, Absolute 01/23/2023 2.96    • Lymphocytes, Absolute 01/23/2023 1.06    • Monocytes, Absolute 01/23/2023 0.41    • Eosinophils, Absolute 01/23/2023 0.06    • Basophils, Absolute 01/23/2023 0.03    • Immature Grans, Absolute 01/23/2023 0.01    • nRBC 01/23/2023 0.0    Lab on 01/09/2023   Component Date Value   • Glucose 01/09/2023 124 (H)    • BUN 01/09/2023 23    • Creatinine 01/09/2023 0.87    • Sodium 01/09/2023 143    • Potassium 01/09/2023 4.7    • Chloride 01/09/2023 103    • CO2 01/09/2023 32.3 (H)    • Calcium 01/09/2023 10.5    • Total Protein 01/09/2023 7.2    • Albumin 01/09/2023 4.4    • ALT (SGPT) 01/09/2023 22    • AST (SGOT) 01/09/2023 22    • Alkaline Phosphatase 01/09/2023 63    • Total Bilirubin 01/09/2023 0.5    • Globulin 01/09/2023 2.8    • A/G Ratio 01/09/2023 1.6    • BUN/Creatinine Ratio 01/09/2023 26.4 (H)    • Anion Gap 01/09/2023 7.7    • eGFR 01/09/2023 75.4    • WBC 01/09/2023 4.91    • RBC 01/09/2023 4.35    • Hemoglobin 01/09/2023 14.7    • Hematocrit 01/09/2023 44.7    • MCV 01/09/2023 102.8 (H)    • MCH 01/09/2023 33.8 (H)    • MCHC 01/09/2023 32.9    • RDW 01/09/2023 15.3    • RDW-SD 01/09/2023 58.0 (H)    • MPV 01/09/2023 10.3    • Platelets 01/09/2023 237    • Neutrophil % 01/09/2023 57.3    • Lymphocyte % 01/09/2023 33.2    • Monocyte % 01/09/2023 6.1    • Eosinophil % 01/09/2023 2.4    • Basophil % 01/09/2023 0.8    • Immature Grans %  01/09/2023 0.2    • Neutrophils, Absolute 01/09/2023 2.81    • Lymphocytes, Absolute 01/09/2023 1.63    • Monocytes, Absolute 01/09/2023 0.30    • Eosinophils, Absolute 01/09/2023 0.12    • Basophils, Absolute 01/09/2023 0.04    • Immature Grans, Absolute 01/09/2023 0.01    • nRBC 01/09/2023 0.0    Lab on 12/27/2022   Component Date Value   • Glucose 12/27/2022 93    • BUN 12/27/2022 24 (H)    • Creatinine 12/27/2022 0.88    • Sodium 12/27/2022 142    • Potassium 12/27/2022 4.8 (H)    • Chloride 12/27/2022 100    • CO2 12/27/2022 31.4 (H)    • Calcium 12/27/2022 9.9    • Total Protein 12/27/2022 6.9    • Albumin 12/27/2022 4.1    • ALT (SGPT) 12/27/2022 15    • AST (SGOT) 12/27/2022 17    • Alkaline Phosphatase 12/27/2022 59    • Total Bilirubin 12/27/2022 0.4    • Globulin 12/27/2022 2.8    • A/G Ratio 12/27/2022 1.5    • BUN/Creatinine Ratio 12/27/2022 27.3    • Anion Gap 12/27/2022 10.6    • eGFR 12/27/2022 74.4    • Magnesium 12/27/2022 2.1    • Phosphorus 12/27/2022 4.0    • WBC 12/27/2022 6.10    • RBC 12/27/2022 4.07    • Hemoglobin 12/27/2022 13.9    • Hematocrit 12/27/2022 42.3    • MCV 12/27/2022 103.9 (H)    • MCH 12/27/2022 34.2 (H)    • MCHC 12/27/2022 32.9    • RDW 12/27/2022 15.8 (H)    • RDW-SD 12/27/2022 60.2 (H)    • MPV 12/27/2022 9.8    • Platelets 12/27/2022 245    • Neutrophil % 12/27/2022 68.5    • Lymphocyte % 12/27/2022 21.5    • Monocyte % 12/27/2022 8.2    • Eosinophil % 12/27/2022 1.1    • Basophil % 12/27/2022 0.5    • Immature Grans % 12/27/2022 0.2    • Neutrophils, Absolute 12/27/2022 4.18    • Lymphocytes, Absolute 12/27/2022 1.31    • Monocytes, Absolute 12/27/2022 0.50    • Eosinophils, Absolute 12/27/2022 0.07    • Basophils, Absolute 12/27/2022 0.03    • Immature Grans, Absolute 12/27/2022 0.01    • nRBC 12/27/2022 0.0      Current Psychotropic Medications:  Cymbalta 30 mg bid  Modafinil 100 mg q am    Plan of Care/ Medical Decision Making  Continue cymbalta and modafinil as  written. Dr. Hancock has filled temazepoam.  Sleep hygiene discussed, as well as benefits to considering effort required/ reward in behavioral activation strategies.  Effective communication strategies explored in conjunction with time with .    Diagnoses and all orders for this visit:    1. Metastatic breast cancer (HCC) (Primary)    2. Neoplastic malignant related fatigue  -     modafinil (PROVIGIL) 200 MG tablet; Take 1 tablet by mouth Daily.  Dispense: 30 tablet; Refill: 2    3. Major depressive disorder, recurrent episode, moderate (HCC)  -     modafinil (PROVIGIL) 200 MG tablet; Take 1 tablet by mouth Daily.  Dispense: 30 tablet; Refill: 2    Other orders  -     DULoxetine (CYMBALTA) 30 MG capsule; Take 1 capsule by mouth 2 (Two) Times a Day.  Dispense: 180 capsule; Refill: 0    I spent 31 minutes caring for Martha on this date of service.

## 2023-02-24 ENCOUNTER — SPECIALTY PHARMACY (OUTPATIENT)
Dept: PHARMACY | Facility: HOSPITAL | Age: 63
End: 2023-02-24
Payer: MEDICARE

## 2023-02-24 NOTE — PROGRESS NOTES
Spoke with patient: Patient is okay with the brand name Xeloda due to the generic shortage.     Specialty Pharmacy Refill Coordination Note     Martha is a 62 y.o. female contacted today regarding refills of  Xeloda specialty medication(s).    Reviewed and verified with patient:       Specialty medication(s) and dose(s) confirmed: yes    Refill Questions    Flowsheet Row Most Recent Value   Changes to allergies? No   Changes to medications? No   New conditions since last clinic visit No   Unplanned office visit, urgent care, ED, or hospital admission in the last 4 weeks  No   How does patient/caregiver feel medication is working? Good   Financial problems or insurance changes  No   Since the previous refill, were any specialty medication doses or scheduled injections missed or delayed?  No   Does this patient require a clinical escalation to a pharmacist? No          Delivery Questions    Flowsheet Row Most Recent Value   Delivery method [employer]  [[employer] priority sig required ship 3/2 deliver 3/3]   Delivery address correct? Yes   Preferred delivery time? AM   Number of medications in delivery 1   Medication being filled and delivered Xeloda   Doses left of specialty medications 7 days- start 2/27   Is there any medication that is due not being filled? No   Supplies needed? No supplies needed   Cooler needed? No   Do any medications need mixed or dated? No   Copay form of payment Payment plan already set up   Questions or concerns for the pharmacist? No   Are any medications first time fills? No                 Follow-up: 3 week(s)     Ami Hudson  Specialty Pharmacy Technician

## 2023-02-28 ENCOUNTER — HOSPITAL ENCOUNTER (OUTPATIENT)
Facility: SURGERY CENTER | Age: 63
Setting detail: HOSPITAL OUTPATIENT SURGERY
Discharge: HOME | End: 2023-02-28
Attending: INTERNAL MEDICINE | Admitting: INTERNAL MEDICINE
Payer: MEDICARE

## 2023-02-28 ENCOUNTER — ANESTHESIA EVENT (OUTPATIENT)
Dept: SURGERY | Facility: SURGERY CENTER | Age: 63
End: 2023-02-28
Payer: MEDICARE

## 2023-02-28 ENCOUNTER — ANESTHESIA (OUTPATIENT)
Dept: SURGERY | Facility: SURGERY CENTER | Age: 63
End: 2023-02-28
Payer: MEDICARE

## 2023-02-28 VITALS
BODY MASS INDEX: 32.17 KG/M2 | RESPIRATION RATE: 18 BRPM | DIASTOLIC BLOOD PRESSURE: 64 MMHG | HEART RATE: 97 BPM | SYSTOLIC BLOOD PRESSURE: 111 MMHG | HEIGHT: 62 IN | WEIGHT: 174.8 LBS | TEMPERATURE: 97.3 F | OXYGEN SATURATION: 97 %

## 2023-02-28 DIAGNOSIS — Z86.010 HISTORY OF COLON POLYPS: ICD-10-CM

## 2023-02-28 PROCEDURE — G0105 COLORECTAL SCRN; HI RISK IND: HCPCS | Performed by: INTERNAL MEDICINE

## 2023-02-28 PROCEDURE — 25010000002 PHENYLEPHRINE 10 MG/ML SOLUTION: Performed by: ANESTHESIOLOGY

## 2023-02-28 PROCEDURE — 0 LIDOCAINE 1 % SOLUTION: Performed by: ANESTHESIOLOGY

## 2023-02-28 PROCEDURE — 25010000002 PROPOFOL 200 MG/20ML EMULSION: Performed by: ANESTHESIOLOGY

## 2023-02-28 RX ORDER — LIDOCAINE HYDROCHLORIDE 10 MG/ML
INJECTION, SOLUTION INFILTRATION; PERINEURAL AS NEEDED
Status: DISCONTINUED | OUTPATIENT
Start: 2023-02-28 | End: 2023-02-28 | Stop reason: SURG

## 2023-02-28 RX ORDER — SODIUM CHLORIDE, SODIUM LACTATE, POTASSIUM CHLORIDE, CALCIUM CHLORIDE 600; 310; 30; 20 MG/100ML; MG/100ML; MG/100ML; MG/100ML
1000 INJECTION, SOLUTION INTRAVENOUS CONTINUOUS
Status: DISCONTINUED | OUTPATIENT
Start: 2023-02-28 | End: 2023-02-28 | Stop reason: HOSPADM

## 2023-02-28 RX ORDER — EPHEDRINE SULFATE 50 MG/ML
INJECTION INTRAVENOUS AS NEEDED
Status: DISCONTINUED | OUTPATIENT
Start: 2023-02-28 | End: 2023-02-28 | Stop reason: SURG

## 2023-02-28 RX ORDER — SODIUM CHLORIDE, SODIUM LACTATE, POTASSIUM CHLORIDE, CALCIUM CHLORIDE 600; 310; 30; 20 MG/100ML; MG/100ML; MG/100ML; MG/100ML
30 INJECTION, SOLUTION INTRAVENOUS CONTINUOUS PRN
Status: DISCONTINUED | OUTPATIENT
Start: 2023-02-28 | End: 2023-02-28 | Stop reason: HOSPADM

## 2023-02-28 RX ORDER — LIDOCAINE HYDROCHLORIDE 10 MG/ML
0.5 INJECTION, SOLUTION INFILTRATION; PERINEURAL ONCE AS NEEDED
Status: DISCONTINUED | OUTPATIENT
Start: 2023-02-28 | End: 2023-02-28 | Stop reason: HOSPADM

## 2023-02-28 RX ORDER — PROPOFOL 10 MG/ML
INJECTION, EMULSION INTRAVENOUS CONTINUOUS PRN
Status: DISCONTINUED | OUTPATIENT
Start: 2023-02-28 | End: 2023-02-28 | Stop reason: SURG

## 2023-02-28 RX ORDER — PHENYLEPHRINE HYDROCHLORIDE 10 MG/ML
INJECTION INTRAVENOUS AS NEEDED
Status: DISCONTINUED | OUTPATIENT
Start: 2023-02-28 | End: 2023-02-28 | Stop reason: SURG

## 2023-02-28 RX ADMIN — PROPOFOL 200 MCG/KG/MIN: 10 INJECTION, EMULSION INTRAVENOUS at 13:44

## 2023-02-28 RX ADMIN — EPHEDRINE SULFATE 10 MG: 50 INJECTION, SOLUTION INTRAVENOUS at 14:09

## 2023-02-28 RX ADMIN — SODIUM CHLORIDE, SODIUM LACTATE, POTASSIUM CHLORIDE, AND CALCIUM CHLORIDE: .6; .31; .03; .02 INJECTION, SOLUTION INTRAVENOUS at 13:43

## 2023-02-28 RX ADMIN — SODIUM CHLORIDE, POTASSIUM CHLORIDE, SODIUM LACTATE AND CALCIUM CHLORIDE 30 ML/HR: 600; 310; 30; 20 INJECTION, SOLUTION INTRAVENOUS at 13:03

## 2023-02-28 RX ADMIN — LIDOCAINE HYDROCHLORIDE 50 MG: 10 INJECTION, SOLUTION INFILTRATION; PERINEURAL at 13:44

## 2023-02-28 RX ADMIN — EPHEDRINE SULFATE 10 MG: 50 INJECTION, SOLUTION INTRAVENOUS at 14:00

## 2023-02-28 RX ADMIN — PHENYLEPHRINE HYDROCHLORIDE 100 MCG: 10 INJECTION INTRAVENOUS at 13:58

## 2023-02-28 RX ADMIN — PHENYLEPHRINE HYDROCHLORIDE 100 MCG: 10 INJECTION INTRAVENOUS at 14:09

## 2023-02-28 NOTE — ANESTHESIA PREPROCEDURE EVALUATION
Anesthesia Evaluation     history of anesthetic complications: PONV  NPO Solid Status: > 8 hours  NPO Liquid Status: > 8 hours           Airway   Mallampati: I  TM distance: >3 FB  No difficulty expected  Dental      Pulmonary    (+) sleep apnea,   Cardiovascular   Exercise tolerance: good (4-7 METS)    (+) hypertension, hyperlipidemia,       Neuro/Psych  (+) numbness, psychiatric history,    GI/Hepatic/Renal/Endo    (+)  GERD,      Musculoskeletal     Abdominal    Substance History      OB/GYN          Other   arthritis,    history of cancer                    Anesthesia Plan    ASA 3     MAC     intravenous induction     Anesthetic plan, risks, benefits, and alternatives have been provided, discussed and informed consent has been obtained with: patient.        CODE STATUS:

## 2023-02-28 NOTE — ANESTHESIA POSTPROCEDURE EVALUATION
"Patient: Martha Davey    Procedure Summary     Date: 02/28/23 Room / Location: [facility]    Anesthesia Start: 1343 Anesthesia Stop: 1423    Procedure: COLONOSCOPY Diagnosis:       History of colon polyps      (History of colon polyps [Z86.010])    Surgeons: Theodore Najera MD Provider: Kaden Bagley MD    Anesthesia Type: MAC ASA Status: 3          Anesthesia Type: MAC    Vitals  No vitals data found for the desired time range.          Post Anesthesia Care and Evaluation    Patient location during evaluation: PACU  Patient participation: complete - patient participated  Level of consciousness: awake and alert  Pain management: adequate    Airway patency: patent  Anesthetic complications: No anesthetic complications  PONV Status: controlled  Cardiovascular status: acceptable and hemodynamically stable  Respiratory status: acceptable  Hydration status: acceptable    Comments: /69 (BP Location: Left arm, Patient Position: Lying)   Pulse 105   Temp 36.3 °C (97.3 °F) (Temporal)   Resp 18   Ht 157.5 cm (62\")   Wt 79.3 kg (174 lb 12.8 oz)   LMP  (LMP Unknown)   SpO2 95%   BMI 31.97 kg/m²       "

## 2023-02-28 NOTE — ANESTHESIA POSTPROCEDURE EVALUATION
Patient: Martha Davey    Procedure Summary     Date: 02/28/23 Room / Location: [facility]    Anesthesia Start: 1343 Anesthesia Stop: 1423    Procedure: COLONOSCOPY Diagnosis:       History of colon polyps      (History of colon polyps [Z86.010])    Surgeons: Theodore Najera MD Provider: Kaden Bagley MD    Anesthesia Type: MAC ASA Status: 3          Anesthesia Type: MAC    Vitals  Vitals Value Taken Time   /64 02/28/23 1430   Temp     Pulse 97 02/28/23 1430   Resp     SpO2 97 % 02/28/23 1430           Post Anesthesia Care and Evaluation      Comments: Post op note entered for chart completion purposes only

## 2023-03-03 ENCOUNTER — HOSPITAL ENCOUNTER (OUTPATIENT)
Dept: CT IMAGING | Facility: HOSPITAL | Age: 63
Discharge: HOME | End: 2023-03-03
Payer: MEDICARE

## 2023-03-03 ENCOUNTER — HOSPITAL ENCOUNTER (OUTPATIENT)
Dept: NUCLEAR MEDICINE | Facility: HOSPITAL | Age: 63
Discharge: HOME | End: 2023-03-03
Payer: MEDICARE

## 2023-03-03 ENCOUNTER — TELEPHONE (OUTPATIENT)
Dept: ONCOLOGY | Facility: CLINIC | Age: 63
End: 2023-03-03
Payer: MEDICARE

## 2023-03-03 DIAGNOSIS — Z17.1 MALIGNANT NEOPLASM OF OVERLAPPING SITES OF RIGHT BREAST IN FEMALE, ESTROGEN RECEPTOR NEGATIVE: ICD-10-CM

## 2023-03-03 DIAGNOSIS — C50.811 MALIGNANT NEOPLASM OF OVERLAPPING SITES OF RIGHT BREAST IN FEMALE, ESTROGEN RECEPTOR NEGATIVE: ICD-10-CM

## 2023-03-03 LAB — CREAT BLDA-MCNC: 1 MG/DL (ref 0.6–1.3)

## 2023-03-03 PROCEDURE — 0 DIATRIZOATE MEGLUMINE & SODIUM PER 1 ML: Performed by: INTERNAL MEDICINE

## 2023-03-03 PROCEDURE — 0 TECHNETIUM MEDRONATE KIT: Performed by: INTERNAL MEDICINE

## 2023-03-03 PROCEDURE — 82565 ASSAY OF CREATININE: CPT

## 2023-03-03 PROCEDURE — 78306 BONE IMAGING WHOLE BODY: CPT

## 2023-03-03 PROCEDURE — 74177 CT ABD & PELVIS W/CONTRAST: CPT

## 2023-03-03 PROCEDURE — A9503 TC99M MEDRONATE: HCPCS | Performed by: INTERNAL MEDICINE

## 2023-03-03 PROCEDURE — 71260 CT THORAX DX C+: CPT

## 2023-03-03 PROCEDURE — 25510000001 IOPAMIDOL 61 % SOLUTION: Performed by: INTERNAL MEDICINE

## 2023-03-03 RX ORDER — TC 99M MEDRONATE 20 MG/10ML
22.3 INJECTION, POWDER, LYOPHILIZED, FOR SOLUTION INTRAVENOUS
Status: COMPLETED | OUTPATIENT
Start: 2023-03-03 | End: 2023-03-03

## 2023-03-03 RX ADMIN — Medication 22.3 MILLICURIE: at 10:21

## 2023-03-03 RX ADMIN — DIATRIZOATE MEGLUMINE AND DIATRIZOATE SODIUM 30 ML: 600; 100 SOLUTION ORAL; RECTAL at 11:44

## 2023-03-03 RX ADMIN — IOPAMIDOL 85 ML: 612 INJECTION, SOLUTION INTRAVENOUS at 11:44

## 2023-03-03 NOTE — TELEPHONE ENCOUNTER
LEFT MSG ON PT VM- HAD TO R/S HER 3/10 APPT AT [facility] TO 3/13 AT  - MAILED OUT UPDATED APPTS

## 2023-03-07 ENCOUNTER — TELEPHONE (OUTPATIENT)
Dept: ONCOLOGY | Facility: CLINIC | Age: 63
End: 2023-03-07

## 2023-03-07 NOTE — TELEPHONE ENCOUNTER
Caller: Martha Davey    Relationship to patient: Self    Best call back number: [phone]    Chief complaint: IRENE AT WRONG LOCATION    Type of visit: LAB    Requested date: 3/13/2023  AT 2:00PM AT [facility] LOC[facility]     If rescheduling, when is the original appointment: 3/13/2023

## 2023-03-10 NOTE — PROGRESS NOTES
Chief Complaint  Previous Stage Ib ([id number]) ER/PA positive, HER-2/peter negative right breast cancer with subsequent metastatic disease identified 10/8/2017, history of right pulmonary embolism, cancer related pain, chemotherapy-induced diarrhea, chemotherapy-induced mucositis, chemotherapy-induced hand-foot syndrome    Subjective        History of Present Illness  The patient returns today in follow-up continuing on oral Xeloda 1000 mg in the morning, 1500 mg in the evening for 7 days on followed by 7 days off as well as monthly Xgeva and Eliquis 5 mg twice daily.  The patient last received Xgeva on 1/23/2023.  It appears that she had canceled the visit to perform and review scans in early February and her treatment and scans have been delayed until today.  She did undergo colonoscopy in the interval on 2/28/2023 with finding only of diverticulosis, no other abnormalities.  She did hold her Xeloda next week for colonoscopy.  She continues to tolerate Xgeva relatively well.  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  She is not having any current difficulty with her eyes.  Mobility has been stable although she has not been walking for exercise recently due to the cold weather as well as managing things with her mother who has been in and out of rehab as well as with her 's medical issues.  She did see supportive oncology in follow-up on 2/23/2023.  She does have chronic difficulty in regards to her left shoulder, has seen Dr. Garcia in orthopedics in the past who felt that she likely needs shoulder replacement surgery.  She has not however pursued this in light of her ongoing chemotherapy and metastatic malignancy.  She is inquiring whether there is something beyond tramadol and Advil for her pain.      Objective   Vital Signs:   /79   Pulse 83   Temp 97.8 °F (36.6 °C) (Temporal)   Resp  "18   Ht 157.5 cm (62.01\")   Wt 82.6 kg (182 lb 3.2 oz)   SpO2 98%   BMI 33.32 kg/m²     Physical Exam  Constitutional:       Appearance: She is well-developed.      Comments: Patient ambulates with the use of a cane   Eyes:      Conjunctiva/sclera: Conjunctivae normal.   Neck:      Thyroid: No thyromegaly.   Cardiovascular:      Rate and Rhythm: Normal rate and regular rhythm.      Heart sounds: No murmur heard.    No friction rub. No gallop.   Pulmonary:      Effort: No respiratory distress.      Breath sounds: Normal breath sounds.   Abdominal:      General: Bowel sounds are normal. There is no distension.      Palpations: Abdomen is soft.      Tenderness: There is no abdominal tenderness.   Musculoskeletal:      Comments: Braces in place in the bilateral lower extremities   Lymphadenopathy:      Head:      Right side of head: No submandibular adenopathy.      Cervical: No cervical adenopathy.      Upper Body:      Right upper body: No supraclavicular adenopathy.      Left upper body: No supraclavicular adenopathy.   Skin:     General: Skin is warm and dry.      Findings: Rash present.      Comments: Erythema involving the palms overall improved with decrease in the degree of erythema on the palms of the hands and extension into the dorsal aspect of the hands.  There is significant dryness noted with skin that is cracked and flaking.  Sclerodactyly has improved somewhat.   Neurological:      Mental Status: She is alert and oriented to person, place, and time.      Cranial Nerves: No cranial nerve deficit.      Motor: No abnormal muscle tone.      Deep Tendon Reflexes: Reflexes normal.   Psychiatric:         Behavior: Behavior normal.         Result Review : Reviewed CBC, CMP, magnesium, phosphorus from today.  Reviewed CT chest abdomen pelvis and bone scan from 3/3/2023.     Assessment and Plan     1. Previous Stage Ib ([id number]) right breast cancer:  · Diagnosed May 2010 with excisional biopsy for invasive " ductal carcinoma, 1.3 cm, grade 2, ER 90%, SC 80%, HER-2/peter negative (1+ IHC).    · Subsequent right mastectomy in July 2010 with no residual breast malignancy, 1/5 sentinel lymph node with micrometastasis (0.25 mm).    · Treated in the [facility] system with adjuvant AC ×4 cycles in 2010 (no taxanes administered due to underlying Charcot-Saloni-Tooth with peripheral neuropathy).    · Adjuvant Femara (postmenopausal) initiated October 2010 with plan to treat ×10 years.    · Genetic testing reportedly negative.    · Developed osteopenia treated with Prolia beginning 2/27/13. Subsequently discontinued due to identification of metastatic disease.  2. Recurrent/metastatic disease identified 10/8/17:  · Disease involving thoracic spine with cord compression at T6, lumbosacral involvement, sternal and right sternoclavicular involvement.    · Femara discontinued in 10/2017.    · Radiation administered (in the [facility] system) to the thoracic spine beginning 10/19/17 treating T3-T9 to a dose of 24 gray in 6 fractions.  · Evaluation with MRI 12/8/17 showing persistent T6 cord compression with persistent neurologic compromise requiring surgical treatment 12/11/17 with T6 laminectomy/corpectomy and T3-T9 fusion.  Pathology with metastatic carcinoma of breast origin, ER negative, SC negative, HER-2/peter negative (1+ IHC).  · Additional staging evaluation 12/8/17 with no evidence of visceral metastatic disease, bone scan showing involvement of thoracic spine, sternum, left humerus, mid frontal bone.  No plane film correlate of left humerus lesion.  MRI lumbar spine with small intradural L3 metastasis.  CA 15-3 12/6/17- 17.  · Palliative radiation therapy to L3 dural metastasis and left humerus initiated 1/15/18 (10 fractions), completed 1/26/18.  · Hypercalcemia of malignancy with calcium in the 10-11 range.  · Initiation of monthly Xgeva 1/23/18.  · Baseline CT scan 1/30/18 with no evidence of visceral involvement.  Cluster of nodular  opacities in the right lower lobe suspected to be infectious or related to bronchiolitis. Bone scan 1/30/18 showed postsurgical change in the thoracic spine, stable uptake in the frontal bone, no new areas of disease.  · Initiation of palliative oral single agent Xeloda 2/7/18 2000 mg a.m., 1500 mg p.m. for 14/21 days.   · Following 3 cycles xeloda, bone scan 4/4/18 showed no change from the prior study.  CT scan 4/4/18 showed a small pericardial effusion of unclear significance as well as a subcutaneous nodule in the right lateral chest wall.  Subsequent evaluation with echocardiogram 4/17/18 showed no evidence of pericardial effusion.  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  We discussed the possibility of surgical excision of the right subcutaneous chest wall lesion for more definitive diagnosis.  Reviewed previous CT images dating back to 12/8/17 and the lesion was present even at that time measuring around 1.7 cm although not commented on in the radiology report.  As this appears to be an indolent low-grade process unrelated to her breast cancer, recommendee foregoing surgical excision at this time and monitoring this area on future scans.  The patient agreed.    · Following 6 cycles of Xeloda, CT 6/6/18  showed stable findings, no evidence of progressive disease.  There was a comment regarding subcutaneous abnormality in the anterior abdominal wall and this was related to Lovenox injection sites.  Bone scan 6/6/18 showed no interval change.   · CT scan and bone scan 8/13/18 following 9 cycles of Xeloda showed stable findings with no evidence of significant visceral metastases.  Her bone lesions appear stable on bone scan.  The spindle cell neoplasm in her right chest wall actually decreased in size from 2 cm down to 1.6 cm.    · The patient experienced some  symptoms of diarrhea, anorexia, generalized weakness during cycle 9 Xeloda it was unclear whether this was related to a viral gastroenteritis or toxicity from treatment.  Symptoms recurred during cycle 10 and treatment was cut short by 2 days.  Symptoms attributed to Xeloda.  With cycle 11, dose and schedule altered to 1500 mg twice daily for 7 days on followed by 7 days off .  · CT scan 9/9/2020 with no significant changes.  Bone scan 9/9/2020 read as unchanged from prior studies however did note an area of slight activity in the medial left femur.  Contacted radiology and although this was not noted on prior reports appears to have been present.  Subsequent MRI left femur 9/21/2020 with cortical thickening and periosteal edema left iliac us muscle insertion to the medial left femur with no evidence of metastatic disease, favored to represent periosteal change secondary to insertional tendinitis.  · Severe hand-foot symptoms causing sclerodactyly and limitation in finger movement prompted change in dosing in July 2021 with Xeloda decreased to 1000 mg a.m., 1500 mg p.m. for 7 days on followed by 7 days off.  · Patient was experiencing severe hand-foot syndrome related to Xeloda having developed skin peeling, sclerodactyly with limited use of her hands.  On 3/31/2022, Xeloda was held to allow for recovery.  Patient resumed Xeloda on 4/18/2022.  · Patient required hospitalization 5/29 - 6/1/2022 with presumed viral gastroenteritis that was exacerbated by Xeloda.  Xeloda held briefly, resumed on 6/6/2022.  · Patient again with worsening sclerodactyly, Xeloda held for 2 weeks from 10/3 - 10/17/2022, resumed at prior dose with improvement in symptoms.  · Most recent scans from 3/3/2023 with CT chest abdomen pelvis and bone scan showing stable findings.  · The patient returns today continuing on treatment with Xeloda 1000 mg a.m., 1500 mg p.m. 7 days on followed by 7 days off.  She also continues on monthly Xgeva, overdue  for treatment today.  Patient is tolerating Xeloda reasonably well, hand-foot symptoms are somewhat improved.  She did old Xeloda for an extra week around the time of her colonoscopy 2/28/2023 due to the prep.  We reviewed her scans from 3/3/2023 showing stable CT chest abdomen pelvis and bone scan.  We will continue on with treatment as planned.  She will return in 1 month for labs and Xgeva and I will see her in 2 months for follow-up.  We will anticipate repeat scans at a 4-month interval again.  3. Right pulmonary embolism:  · Diagnosed on CT angiogram 10/21/17 involving small right lower lobe pulmonary artery.  Lower extremity Dopplers negative.  · Bilateral lower extremity Dopplers negative again 12/5/17.  · Received chronic Lovenox 1 mg/kg twice per day, transition to oral Eliquis in February 2019, continuing on Eliquis 5 mg twice daily.  · Patient continues on anticoagulation without any complications.  4. Cancer related pain:  · Previously receiving Duragesic 50 µg patch every 72 hours along with Dilaudid 4 mg as needed for breakthrough pain  · The patient's pain improved over time and she was able to discontinue both Duragesic and Dilaudid in the interval.  · Patient does take occasional Flexeril at bedtime due to back spasm/pain when she has been more active.  · The patient does have some occasional aggravation of her chronic back pain and does use tramadol 50 mg every 8 hours as needed  · Patient does continue to use tramadol 50 mg every 8-12 hours as needed.  She is primarily experiencing pain in her left shoulder, notes that the tramadol has not been effective recently.  This is a chronic pain which is increased slightly in severity however quality remains consistent.  We will prescribe hydrocodone 5/325 every 4 hours as needed (#60).  I did recommend that she return to see Dr. Garcia in orthopedics to see if there were any recommendations regarding physical therapy or injection as opposed to shoulder  replacement which had been recommended previously.  5. Chemotherapy-induced diarrhea with subsequent C. difficile colitis in the setting of previous ulcerative colitis and then identification of enteropathogenic E. coli:  · Patient with reported history of ulcerative colitis, continues on mesalamine.  · The patient developed initial diarrhea related to Xeloda at regular dosing.  · Symptoms improved on reduced dose Xeloda  · Flare of symptoms in October 2018 with apparent finding of C. difficile colitis by GI, treated with course of oral vancomycin with improvement in symptoms.  · Patient notes minimal intermittent diarrhea/loose stools on Xeloda requiring occasional dosing of Imodium.    · Patient required hospitalization 5/29 - 6/1/2022 with presumed viral gastroenteritis that was exacerbated by Xeloda.  Xeloda held briefly, resumed on 6/6/2022.  · Patient's  developed C. difficile colitis and patient was experiencing increase in diarrhea.  Stool studies performed 8/4/22 negative C. difficile however GI PCR was positive for enteropathogenic E. coli.  Given patient's symptoms and immunocompromise status it was elected to treat her with azithromycin 500 mg daily x3 days beginning 8/5/2022  · Diarrhea resolved  · Patient underwent colonoscopy on 2/28/2023 with findings of diverticulosis  6. Traumatic left tibia/fibular fracture:  · Status post ORIF 12/6/17  · Specimen was sent for pathologic review, negative for evidence of malignancy  7. Hypercalcemia:  · Suspect hypercalcemia of malignancy, calcium in  10-11 range previously.  · Calcium normalized following initiation of monthly Xgeva on 1/23/18.  8. Chemotherapy-induced mucositis:  · Patient had a minimal degree of mucositis with cycle 2.  The patient has magic mouthwash to use as needed.  No subsequent mucositis.  9. Recurrent UTI, bladder wall thickening on CT:  · Patient had an enterococcal UTI on 3/2/18 sensitive to nitrofurantoin and received treatment,  unclear how long.  · Recurrent UTI 3/20/18 with urine culture growing Klebsiella, initially treated with nitrofurantoin, transitioned to Levaquin.  · CT 4/4/18 with diffuse bladder wall thickening with increased nodular thickening at the left base.  Referral to urogynecology Dr. May Johnson.  She was placed on a prophylactic dose of trimethoprim 100 mg daily, bladder wall thickening felt to be related to recent recurrent urinary tract infections.  · Patient with urinary symptoms, treated with course of Macrobid at the end of December 2018, urine culture however was negative 12/31/18.  · Patient was found to have Klebsiella UTI 7/29/2019 which was successfully treated with Macrobid with complete resolution of symptoms.  · CT 8/10/2021 with diffuse bladder wall thickening new from 5/17/2021 (however seen on multiple prior scans).    · UTI on 10/18/2021 with E. coli greater than 100,000 colonies that was pansensitive, treated with Macrobid x7 days  · With ongoing/recurrent UTIs patient was seen by urogynecology and initiated suppressive therapy with trimethoprim 100 mg daily in December 2021.  She has not experienced any further urinary tract infections.  · CT abdomen pelvis 3/28/2022 does show diffuse thickening of urinary bladder as has been seen on prior studies indicative of cystitis.  · Patient notes that she did stop taking trimethoprim on a daily basis.  She did follow-up recently with urogynecology, aware that she is off of suppressive antibiotics.  Fortunately however she has not experienced any recurrent UTIs recently.  10.   Mobility:  · The patient underwent an intensive course of rehabilitation at [facility].  She graduated from her outpatient course November 2018.  · Overall the patient has improved dramatically in terms of mobility,   · Patient had been walking for exercise on a regular basis but reports she has not been doing this as much recently due to the cold weather and responsibilities in caring for  family members.  11.  Depression:  · The patient is continuing follow-up in the supportive oncology clinic and is currently continuing on Cymbalta 30 mg twice daily as well as gabapentin 300 mg at dinner and 300 mg nightly, temazepam 15 mg nightly as needed, Provigil 100 mg daily.  · Patient does continue to attend support groups at [employer].  · The patient does continue routine follow-up in supportive oncology clinic.    · Patient does have multiple stressors with her 's malignancy and chemotherapy as well as her autistic son who is living with them at home.  · Patient continues to have difficulty with stress and anxiety, is being followed by supportive oncology, last seen on 2/23/2023.  12. 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  Symptoms did improve and she continued on the same dose.    · Patient was referred to dermatology and has been continuing on triamcinolone 0.1% cream used for 1 week on followed by 1 week off which led to some further improvement.   · Progressive palmar erythema with development of sclerodactyly and effect on dexterity.  Addition of urea-based cream 3 times daily.  · Patient with continued difficulty regarding erythema of her hands that extended onto the dorsal aspect and was causing contractures/sclerodactyly in her fingers affecting her dexterity.  In July 2021, held Xeloda for an additional week and then reduced dose from 1500 mg twice daily down to 1000 mg a.m. and 1500 mg p.m. and continued on a 7-day on followed by 7-day off schedule.  · With reduction in Xeloda dose, slight improvement in erythema involving dorsal aspect of the hands and slight decrease in sclerodactyly  · Patient was experiencing severe hand-foot syndrome related to Xeloda having developed skin peeling, sclerodactyly with  limited use of her hands.  On 3/31/2022, Xeloda was held to allow for recovery.  Patient resumed Xeloda on 4/18/2022.  · Patient developed worsening sclerodactyly affecting dexterity that required Xeloda to again be briefly held for 2 weeks from 10/3 - 10/17/2022.  Treatment resumed at prior dose after improvement in symptoms.  · Patient continues to use emollient cream frequently (currently 5 times daily) and topical triamcinolone 0.1% twice daily intermittently (2 weeks on followed by 2 weeks off as instructed by dermatology).  Symptoms currently slightly improved with decrease in erythema on the palms and dorsal aspect of the hands although there is a slight increase in scaling and dry skin.  Sclerodactyly persists but is somewhat improved and does not interfere significantly with her dexterity currently.  13. Evidence of steatosis on scans with mild intermittent elevated liver function studies:  · Liver function studies increased 8/20/19 with ALT 98, AST 70, normal total bilirubin.  · Negative viral hepatitis A, B, and C panel 8/23/18  · Likely related to hepatic steatosis.   · Subsequent improvement in LFTs  · LFTs today are normal  14. Chemotherapy induced leukopenia:  · WBC today  is stable at 6.44  15. GERD, question of celiac disease:  · Patient with significant history of reflux  · Patient currently receiving Protonix 40 mg daily daily and Carafate 1 g 4 times daily as needed  · Patient required hospitalization 5/29 - 6/1/2022 with presumed viral gastroenteritis that was exacerbated by Xeloda.    · EGD performed 5/31/2022 during hospitalization with biopsy that showed focal blunting of villi and atrophy with slight increase in intraepithelial lymphocytes.  Changes were minimal but recommended correlation with serology to exclude celiac disease.  · Celiac serology 8/8/2022 negative  · Patient previously developed worsening reflux symptoms, temporarily increase Protonix to 40 mg twice daily and we did add  Carafate 1 g 4 times daily.    · She is taking Protonix 40 mg once daily, is not taking Carafate.  16. Insomnia:  · Patient with prior paradoxical reaction to Benadryl  · Improved previously on temazepam 15 mg nightly as needed.   · Patient noted subsequently that temazepam was having no effect.   · Symptoms currently stable on gabapentin 300 mg at dinner and 300 mg nightly as well as temazepam 15 mg nightly as needed  17. Health maintenance:  · Patient notes that she has a history of colon polyps as well as ulcerative colitis and was due for a follow-up colonoscopy on 9/12/2019.  We did discuss there is no necessity to pursue colonoscopy in the setting of her metastatic breast cancer.    · The patient did undergo colonoscopy on 2/7/2020 with findings of muscular hypertrophy and diverticulosis.   · Patient did wish to proceed with further colonoscopic evaluation, performed on 12/20/2022 with poor prep.  Repeat 2/28/2023 with diverticulosis.  18. Bilateral shoulder pain:  · Chronic difficulty with right shoulder although she has not complained of this in prior visits to our office.  She reported difficulty with abduction.    · The patient did undergo MRI of her right shoulder on 11/21/2019 at an outside facility showing multiple abnormalities including supraspinatus tendinosis, labral tear but no evidence of metastatic disease.  · Patient developed pain in the left shoulder, was seen by orthopedics and underwent steroid injection with some improvement.  Right shoulder stable.  Patient did undergo physical therapy with some improvement.  She continues to use tramadol 50 mg every 8-12 hours as needed.  · Note that current CT 10/20/2022 made notation of left shoulder probable bursitis.    · Patient with increased discomfort of left shoulder recently.  Tramadol helps however she is requesting something else for pain control and I will prescribe hydrocodone 5/325 every 4 hours as needed (#60).  I encouraged her to return  to Dr. Garcia in orthopedics.  He had previously recommended shoulder replacement however unclear whether additional measures such as physical therapy or additional injection may be effective.  19. Ocular changes in part related to Xeloda:  · Patient experienced a mild degree of blurred vision as well as burning and pruritus.  · She was seen by her ophthalmologist and was placed on xiidra ophthalmic drops which have helped.  · Likely both issues are to some extent related to Xeloda.  · Symptoms did worsen and patient was seen by a new ophthalmologist at [facility].  She is now using an ocular lubricant at night in addition to artificial tears which have helped.  Symptoms currently stable to improved  20. Elevated glucose:  · It is noted the patient's glucose at the last few visits has been in the high 100 range, postprandial.  · She has had a hemoglobin A1c that has been in the high 5-6 range in the past, on 5/1/2019 at 5.7  · Hemoglobin A1c was last checked in 11/12/2021 at 5.8  21. Possible loosening of pedicle screw at T3:  · CT cervical spine 5/17/2021 showed loosening of the left pedicle screw at T3.  Patient referred to orthopedics and was seen by Dr. Nayak who felt that there were no concerning findings on the scan  22. Iron deficiency anemia  · Labs on 5/2/2022 with hemoglobin 10.8.  Additional labs with iron 48, ferritin 21.2, iron saturation 11%, TIBC 4 and 32, folate greater than 20, B12 greater than 2000.    · Attempted treatment with oral iron daily however patient was intolerant  · Patient subsequently received Venofer 300 mg weekly x4 beginning 6/6/2022 and completed on 6/27/2022  · Subsequent iron studies on 7/11/2022 showed improvement with iron 62, ferritin 345, iron saturation 18%, TIBC 336.  Hemoglobin had improved up to 12.7 at that time.  · Repeat iron studies on 10/3/2022 showed iron replete status with hemoglobin 13.7, iron 121, ferritin 208.7, iron saturation 31%, TIBC  392.  · Hemoglobin currently normal at 12.1     Plan:  1. Continue palliative single agent Xeloda 1000 mg a.m., 1500 mg in the p.m. 7 days on followed by 7 days off   2. The patient continues on monthly Xgeva which we are administering today  3. Continue Eliquis 5 mg twice daily  4. The patient will continue frequent use of emollient cream currently 5 times daily and continue periodic triamcinolone cream 0.1% twice daily (provided by dermatology) 2 weeks on followed by 2 weeks off as prescribed  5. Continue Protonix 40 mg daily  6. Continue ocular lubricating gel nightly per ophthalmology  7. Continue Provigil 100 mg daily and Cymbalta 30 mg twice daily, gabapentin 300 mg at dinner and 300 mg at night.  Patient continues routine follow-up with supportive oncology   8. Continue temazepam 15 mg nightly.  9. Patient continues on tramadol 50 mg every 8 hours as needed for pain  10. Patient cautioned regarding use of over-the-counter nonsteroidal medications  11. Prescription sent for hydrocodone 5/325 every 4 hours as needed (#60)  12. The patient will schedule follow-up appointment with Dr. Garcia in orthopedics regarding her left shoulder  13. In 4 weeks CBC, CMP, magnesium, phosphorus and Xgeva  14. MD visit in 8 weeks with CBC, CMP, magnesium, phosphorus and Xgeva.  We will discuss timeframe for follow-up scans possibly at a 4-month interval from current studies.     Patient continuing on high risk medication requiring intensive monitoring.       I did spend 40 minutes in total time caring for this patient today, time spent in review of records, face-to-face consultation, coordination of care, placement of orders, completion of documentation.

## 2023-03-13 ENCOUNTER — INFUSION (OUTPATIENT)
Dept: ONCOLOGY | Facility: HOSPITAL | Age: 63
End: 2023-03-13
Payer: MEDICARE

## 2023-03-13 ENCOUNTER — OFFICE VISIT (OUTPATIENT)
Dept: ONCOLOGY | Facility: CLINIC | Age: 63
End: 2023-03-13
Payer: MEDICARE

## 2023-03-13 ENCOUNTER — LAB (OUTPATIENT)
Dept: OTHER | Facility: HOSPITAL | Age: 63
End: 2023-03-13
Payer: MEDICARE

## 2023-03-13 ENCOUNTER — APPOINTMENT (OUTPATIENT)
Dept: LAB | Facility: HOSPITAL | Age: 63
End: 2023-03-13
Payer: MEDICARE

## 2023-03-13 VITALS
HEART RATE: 83 BPM | RESPIRATION RATE: 18 BRPM | HEIGHT: 62 IN | WEIGHT: 182.2 LBS | BODY MASS INDEX: 33.53 KG/M2 | DIASTOLIC BLOOD PRESSURE: 79 MMHG | SYSTOLIC BLOOD PRESSURE: 131 MMHG | OXYGEN SATURATION: 98 % | TEMPERATURE: 97.8 F

## 2023-03-13 DIAGNOSIS — C50.811 MALIGNANT NEOPLASM OF OVERLAPPING SITES OF RIGHT BREAST IN FEMALE, ESTROGEN RECEPTOR NEGATIVE: Primary | ICD-10-CM

## 2023-03-13 DIAGNOSIS — Z17.1 MALIGNANT NEOPLASM OF OVERLAPPING SITES OF RIGHT BREAST IN FEMALE, ESTROGEN RECEPTOR NEGATIVE: Primary | ICD-10-CM

## 2023-03-13 DIAGNOSIS — Z17.1 MALIGNANT NEOPLASM OF OVERLAPPING SITES OF RIGHT BREAST IN FEMALE, ESTROGEN RECEPTOR NEGATIVE: ICD-10-CM

## 2023-03-13 DIAGNOSIS — C50.811 MALIGNANT NEOPLASM OF OVERLAPPING SITES OF RIGHT BREAST IN FEMALE, ESTROGEN RECEPTOR NEGATIVE: ICD-10-CM

## 2023-03-13 LAB
ALBUMIN SERPL-MCNC: 3.9 G/DL (ref 3.5–5.2)
ALBUMIN/GLOB SERPL: 1.4 G/DL
ALP SERPL-CCNC: 59 U/L (ref 39–117)
ALT SERPL W P-5'-P-CCNC: 15 U/L (ref 1–33)
ANION GAP SERPL CALCULATED.3IONS-SCNC: 8 MMOL/L (ref 5–15)
AST SERPL-CCNC: 20 U/L (ref 1–32)
BASOPHILS # BLD AUTO: 0.05 10*3/MM3 (ref 0–0.2)
BASOPHILS NFR BLD AUTO: 0.8 % (ref 0–1.5)
BILIRUB SERPL-MCNC: 0.4 MG/DL (ref 0–1.2)
BUN SERPL-MCNC: 19 MG/DL (ref 8–23)
BUN/CREAT SERPL: 21.8 (ref 7–25)
CALCIUM SPEC-SCNC: 9.4 MG/DL (ref 8.6–10.5)
CHLORIDE SERPL-SCNC: 102 MMOL/L (ref 98–107)
CO2 SERPL-SCNC: 31 MMOL/L (ref 22–29)
CREAT SERPL-MCNC: 0.87 MG/DL (ref 0.57–1)
DEPRECATED RDW RBC AUTO: 53.5 FL (ref 37–54)
EGFRCR SERPLBLD CKD-EPI 2021: 75.4 ML/MIN/1.73
EOSINOPHIL # BLD AUTO: 0.15 10*3/MM3 (ref 0–0.4)
EOSINOPHIL NFR BLD AUTO: 2.3 % (ref 0.3–6.2)
ERYTHROCYTE [DISTWIDTH] IN BLOOD BY AUTOMATED COUNT: 13.6 % (ref 12.3–15.4)
GLOBULIN UR ELPH-MCNC: 2.7 GM/DL
GLUCOSE SERPL-MCNC: 116 MG/DL (ref 65–99)
HCT VFR BLD AUTO: 37.6 % (ref 34–46.6)
HGB BLD-MCNC: 12.1 G/DL (ref 12–15.9)
IMM GRANULOCYTES # BLD AUTO: 0.02 10*3/MM3 (ref 0–0.05)
IMM GRANULOCYTES NFR BLD AUTO: 0.3 % (ref 0–0.5)
LYMPHOCYTES # BLD AUTO: 1.09 10*3/MM3 (ref 0.7–3.1)
LYMPHOCYTES NFR BLD AUTO: 16.9 % (ref 19.6–45.3)
MAGNESIUM SERPL-MCNC: 1.8 MG/DL (ref 1.6–2.4)
MCH RBC QN AUTO: 34.2 PG (ref 26.6–33)
MCHC RBC AUTO-ENTMCNC: 32.2 G/DL (ref 31.5–35.7)
MCV RBC AUTO: 106.2 FL (ref 79–97)
MONOCYTES # BLD AUTO: 0.51 10*3/MM3 (ref 0.1–0.9)
MONOCYTES NFR BLD AUTO: 7.9 % (ref 5–12)
NEUTROPHILS NFR BLD AUTO: 4.62 10*3/MM3 (ref 1.7–7)
NEUTROPHILS NFR BLD AUTO: 71.8 % (ref 42.7–76)
NRBC BLD AUTO-RTO: 0 /100 WBC (ref 0–0.2)
PHOSPHATE SERPL-MCNC: 3.4 MG/DL (ref 2.5–4.5)
PLAT MORPH BLD: NORMAL
PLATELET # BLD AUTO: 234 10*3/MM3 (ref 140–450)
PMV BLD AUTO: 10.4 FL (ref 6–12)
POTASSIUM SERPL-SCNC: 4.9 MMOL/L (ref 3.5–5.2)
PROT SERPL-MCNC: 6.6 G/DL (ref 6–8.5)
RBC # BLD AUTO: 3.54 10*6/MM3 (ref 3.77–5.28)
SODIUM SERPL-SCNC: 141 MMOL/L (ref 136–145)
SPHEROCYTES BLD QL SMEAR: NORMAL
WBC MORPH BLD: NORMAL
WBC NRBC COR # BLD: 6.44 10*3/MM3 (ref 3.4–10.8)

## 2023-03-13 PROCEDURE — 36415 COLL VENOUS BLD VENIPUNCTURE: CPT

## 2023-03-13 PROCEDURE — 96372 THER/PROPH/DIAG INJ SC/IM: CPT

## 2023-03-13 PROCEDURE — 83735 ASSAY OF MAGNESIUM: CPT | Performed by: INTERNAL MEDICINE

## 2023-03-13 PROCEDURE — 25010000002 DENOSUMAB 120 MG/1.7ML SOLUTION: Performed by: INTERNAL MEDICINE

## 2023-03-13 PROCEDURE — 99215 OFFICE O/P EST HI 40 MIN: CPT | Performed by: INTERNAL MEDICINE

## 2023-03-13 PROCEDURE — 80053 COMPREHEN METABOLIC PANEL: CPT | Performed by: INTERNAL MEDICINE

## 2023-03-13 PROCEDURE — 85025 COMPLETE CBC W/AUTO DIFF WBC: CPT | Performed by: INTERNAL MEDICINE

## 2023-03-13 PROCEDURE — 85007 BL SMEAR W/DIFF WBC COUNT: CPT | Performed by: INTERNAL MEDICINE

## 2023-03-13 PROCEDURE — 84100 ASSAY OF PHOSPHORUS: CPT | Performed by: INTERNAL MEDICINE

## 2023-03-13 RX ORDER — HYDROCODONE BITARTRATE AND ACETAMINOPHEN 5; 325 MG/1; MG/1
1 TABLET ORAL EVERY 4 HOURS PRN
Qty: 60 TABLET | Refills: 0 | Status: SHIPPED | OUTPATIENT
Start: 2023-03-13

## 2023-03-13 RX ADMIN — DENOSUMAB 120 MG: 120 INJECTION SUBCUTANEOUS at 14:37

## 2023-03-23 ENCOUNTER — TELEPHONE (OUTPATIENT)
Dept: ONCOLOGY | Facility: CLINIC | Age: 63
End: 2023-03-23
Payer: MEDICARE

## 2023-03-23 DIAGNOSIS — R19.7 NAUSEA, VOMITING AND DIARRHEA: Primary | ICD-10-CM

## 2023-03-23 DIAGNOSIS — R11.2 NAUSEA, VOMITING AND DIARRHEA: Primary | ICD-10-CM

## 2023-03-23 NOTE — TELEPHONE ENCOUNTER
Called patient to gather more information. Patient reports this is very similar to when she was in the hospital. She has had dry heaves since Sunday night. Zofran is helpful, at first, but quickly wears off. She has not been able to each much of anything since then. She tried taking Xeloda on Monday morning but it came back up so she has not taken anymore doses since then. She did experience some diarrhea yesterday. She has not had a bowel movement today. She denies any fever, recent medication changes, new pain or changes in urination. She does feel slightly fatigued but does not feel so weak where her safety ambulating is of concern. Told her I would touch base with Dr. Hancock and get back to her.

## 2023-03-24 ENCOUNTER — INFUSION (OUTPATIENT)
Dept: ONCOLOGY | Facility: HOSPITAL | Age: 63
End: 2023-03-24
Payer: MEDICARE

## 2023-03-24 ENCOUNTER — SPECIALTY PHARMACY (OUTPATIENT)
Dept: PHARMACY | Facility: HOSPITAL | Age: 63
End: 2023-03-24
Payer: MEDICARE

## 2023-03-24 ENCOUNTER — HOSPITAL ENCOUNTER (OUTPATIENT)
Dept: GENERAL RADIOLOGY | Facility: HOSPITAL | Age: 63
Discharge: HOME | End: 2023-03-24
Admitting: NURSE PRACTITIONER
Payer: MEDICARE

## 2023-03-24 ENCOUNTER — OFFICE VISIT (OUTPATIENT)
Dept: ONCOLOGY | Facility: CLINIC | Age: 63
End: 2023-03-24
Payer: MEDICARE

## 2023-03-24 VITALS
SYSTOLIC BLOOD PRESSURE: 118 MMHG | OXYGEN SATURATION: 94 % | BODY MASS INDEX: 32.07 KG/M2 | HEART RATE: 109 BPM | RESPIRATION RATE: 18 BRPM | TEMPERATURE: 98.9 F | HEIGHT: 62 IN | WEIGHT: 174.3 LBS | DIASTOLIC BLOOD PRESSURE: 79 MMHG

## 2023-03-24 DIAGNOSIS — G89.3 CANCER ASSOCIATED PAIN: ICD-10-CM

## 2023-03-24 DIAGNOSIS — C50.811 MALIGNANT NEOPLASM OF OVERLAPPING SITES OF RIGHT BREAST IN FEMALE, ESTROGEN RECEPTOR NEGATIVE: Primary | ICD-10-CM

## 2023-03-24 DIAGNOSIS — R11.2 NAUSEA, VOMITING AND DIARRHEA: ICD-10-CM

## 2023-03-24 DIAGNOSIS — R19.7 NAUSEA, VOMITING AND DIARRHEA: ICD-10-CM

## 2023-03-24 DIAGNOSIS — C50.011 MALIGNANT NEOPLASM INVOLVING BOTH NIPPLE AND AREOLA OF RIGHT BREAST IN FEMALE, UNSPECIFIED ESTROGEN RECEPTOR STATUS: ICD-10-CM

## 2023-03-24 DIAGNOSIS — Z79.899 HIGH RISK MEDICATION USE: ICD-10-CM

## 2023-03-24 DIAGNOSIS — C79.51 BONE METASTASES: ICD-10-CM

## 2023-03-24 DIAGNOSIS — Z17.1 MALIGNANT NEOPLASM OF OVERLAPPING SITES OF RIGHT BREAST IN FEMALE, ESTROGEN RECEPTOR NEGATIVE: Primary | ICD-10-CM

## 2023-03-24 LAB
ALBUMIN SERPL-MCNC: 4.2 G/DL (ref 3.5–5.2)
ALBUMIN/GLOB SERPL: 1.4 G/DL
ALP SERPL-CCNC: 60 U/L (ref 39–117)
ALT SERPL W P-5'-P-CCNC: 12 U/L (ref 1–33)
ANION GAP SERPL CALCULATED.3IONS-SCNC: 12.7 MMOL/L (ref 5–15)
AST SERPL-CCNC: 21 U/L (ref 1–32)
BASOPHILS # BLD AUTO: 0.08 10*3/MM3 (ref 0–0.2)
BASOPHILS NFR BLD AUTO: 0.8 % (ref 0–1.5)
BILIRUB SERPL-MCNC: 0.7 MG/DL (ref 0–1.2)
BUN SERPL-MCNC: 23 MG/DL (ref 8–23)
BUN/CREAT SERPL: 22.3 (ref 7–25)
CALCIUM SPEC-SCNC: 8.9 MG/DL (ref 8.6–10.5)
CHLORIDE SERPL-SCNC: 101 MMOL/L (ref 98–107)
CO2 SERPL-SCNC: 28.3 MMOL/L (ref 22–29)
CREAT SERPL-MCNC: 1.03 MG/DL (ref 0.57–1)
DEPRECATED RDW RBC AUTO: 51.9 FL (ref 37–54)
EGFRCR SERPLBLD CKD-EPI 2021: 61.6 ML/MIN/1.73
EOSINOPHIL # BLD AUTO: 0.22 10*3/MM3 (ref 0–0.4)
EOSINOPHIL NFR BLD AUTO: 2.2 % (ref 0.3–6.2)
ERYTHROCYTE [DISTWIDTH] IN BLOOD BY AUTOMATED COUNT: 13.8 % (ref 12.3–15.4)
GLOBULIN UR ELPH-MCNC: 2.9 GM/DL
GLUCOSE SERPL-MCNC: 116 MG/DL (ref 65–99)
HCT VFR BLD AUTO: 41.7 % (ref 34–46.6)
HGB BLD-MCNC: 14 G/DL (ref 12–15.9)
IMM GRANULOCYTES # BLD AUTO: 0.02 10*3/MM3 (ref 0–0.05)
IMM GRANULOCYTES NFR BLD AUTO: 0.2 % (ref 0–0.5)
LYMPHOCYTES # BLD AUTO: 1.83 10*3/MM3 (ref 0.7–3.1)
LYMPHOCYTES NFR BLD AUTO: 18.6 % (ref 19.6–45.3)
MAGNESIUM SERPL-MCNC: 2 MG/DL (ref 1.6–2.4)
MCH RBC QN AUTO: 34.7 PG (ref 26.6–33)
MCHC RBC AUTO-ENTMCNC: 33.6 G/DL (ref 31.5–35.7)
MCV RBC AUTO: 103.5 FL (ref 79–97)
MONOCYTES # BLD AUTO: 0.85 10*3/MM3 (ref 0.1–0.9)
MONOCYTES NFR BLD AUTO: 8.6 % (ref 5–12)
NEUTROPHILS NFR BLD AUTO: 6.83 10*3/MM3 (ref 1.7–7)
NEUTROPHILS NFR BLD AUTO: 69.6 % (ref 42.7–76)
NRBC BLD AUTO-RTO: 0 /100 WBC (ref 0–0.2)
PLATELET # BLD AUTO: 308 10*3/MM3 (ref 140–450)
PMV BLD AUTO: 10.5 FL (ref 6–12)
POTASSIUM SERPL-SCNC: 3.9 MMOL/L (ref 3.5–5.2)
PROT SERPL-MCNC: 7.1 G/DL (ref 6–8.5)
RBC # BLD AUTO: 4.03 10*6/MM3 (ref 3.77–5.28)
SODIUM SERPL-SCNC: 142 MMOL/L (ref 136–145)
WBC NRBC COR # BLD: 9.83 10*3/MM3 (ref 3.4–10.8)

## 2023-03-24 PROCEDURE — 74018 RADEX ABDOMEN 1 VIEW: CPT

## 2023-03-24 PROCEDURE — 96361 HYDRATE IV INFUSION ADD-ON: CPT

## 2023-03-24 PROCEDURE — 96375 TX/PRO/DX INJ NEW DRUG ADDON: CPT

## 2023-03-24 PROCEDURE — 3078F DIAST BP <80 MM HG: CPT | Performed by: NURSE PRACTITIONER

## 2023-03-24 PROCEDURE — 1126F AMNT PAIN NOTED NONE PRSNT: CPT | Performed by: NURSE PRACTITIONER

## 2023-03-24 PROCEDURE — 85025 COMPLETE CBC W/AUTO DIFF WBC: CPT | Performed by: INTERNAL MEDICINE

## 2023-03-24 PROCEDURE — 1160F RVW MEDS BY RX/DR IN RCRD: CPT | Performed by: NURSE PRACTITIONER

## 2023-03-24 PROCEDURE — 96374 THER/PROPH/DIAG INJ IV PUSH: CPT

## 2023-03-24 PROCEDURE — 25010000002 ONDANSETRON PER 1 MG: Performed by: NURSE PRACTITIONER

## 2023-03-24 PROCEDURE — 1159F MED LIST DOCD IN RCRD: CPT | Performed by: NURSE PRACTITIONER

## 2023-03-24 PROCEDURE — 99215 OFFICE O/P EST HI 40 MIN: CPT | Performed by: NURSE PRACTITIONER

## 2023-03-24 PROCEDURE — 83735 ASSAY OF MAGNESIUM: CPT | Performed by: INTERNAL MEDICINE

## 2023-03-24 PROCEDURE — 80053 COMPREHEN METABOLIC PANEL: CPT | Performed by: INTERNAL MEDICINE

## 2023-03-24 PROCEDURE — 3074F SYST BP LT 130 MM HG: CPT | Performed by: NURSE PRACTITIONER

## 2023-03-24 RX ORDER — SODIUM CHLORIDE 9 MG/ML
500 INJECTION, SOLUTION INTRAVENOUS ONCE
Status: COMPLETED | OUTPATIENT
Start: 2023-03-24 | End: 2023-03-24

## 2023-03-24 RX ORDER — FAMOTIDINE 10 MG/ML
20 INJECTION, SOLUTION INTRAVENOUS ONCE
Status: COMPLETED | OUTPATIENT
Start: 2023-03-24 | End: 2023-03-24

## 2023-03-24 RX ADMIN — FAMOTIDINE 20 MG: 10 INJECTION INTRAVENOUS at 12:13

## 2023-03-24 RX ADMIN — SODIUM CHLORIDE 500 ML: 9 INJECTION, SOLUTION INTRAVENOUS at 12:08

## 2023-03-24 RX ADMIN — SODIUM CHLORIDE 500 ML: 9 INJECTION, SOLUTION INTRAVENOUS at 13:38

## 2023-03-24 RX ADMIN — ONDANSETRON 8 MG: 2 INJECTION, SOLUTION INTRAMUSCULAR; INTRAVENOUS at 12:19

## 2023-03-24 NOTE — PROGRESS NOTES
I called to schedule for a refill of Xeloda.  Pt missed this entire week of Xeloda due to illness.  She can't keep fluids or solid food down.  She is nauseated all the time.  She also had a fever yesterday.  I suggested a COVID test and call back with the results.  I passed the information on to Dr Hancock's nurse for further assistance.      Ami Hudson  Specialty Pharmacy Technician

## 2023-03-24 NOTE — PROGRESS NOTES
"Chief Complaint  Previous Stage Ib ([id number]) ER/WY positive, HER-2/peter negative right breast cancer with subsequent metastatic disease identified 10/8/2017, history of right pulmonary embolism, cancer related pain, chemotherapy-induced diarrhea, chemotherapy-induced mucositis, chemotherapy-induced hand-foot syndrome    Subjective        History of Present Illness  The patient returns today in follow-up continuing on oral Xeloda 1000 mg in the morning, 1500 mg in the evening for 7 days on followed by 7 days off as well as monthly Xgeva and Eliquis 5 mg twice daily.  The patient last received Xgeva on 1/23/2023.  She was brought into the office today, 3/24/2023 for triage visit regarding intractable nausea and vomiting.  She reports on Sunday she developed lower abdominal discomfort with a change in her bowel habits.  She felt constipated and was straining the passing only small volumes of stool.  She then developed nausea and vomiting.  She reports she has not been able to tolerate any solid food all week.  She has taken occasional Zofran which has not been helpful.  She has had dry heaves due to the intractable vomiting.  She is attempting to drink fluids.  This would have been her week of taking Xeloda though she has not been able to tolerate this due to the nausea vomiting.  She reports feeling hot last night that did not document a fever.  She has no chills.  She denies abdominal pain or blood in her stool.      Objective   Vital Signs:   /79   Pulse 109   Temp 98.9 °F (37.2 °C) (Temporal)   Resp 18   Ht 157.5 cm (62.01\")   Wt 79.1 kg (174 lb 4.8 oz)   SpO2 94%   BMI 31.87 kg/m²     Physical Exam  Constitutional:       Appearance: She is well-developed.      Comments: Patient ambulates with the use of a cane   Eyes:      Conjunctiva/sclera: Conjunctivae normal.   Neck:      Thyroid: No thyromegaly.   Cardiovascular:      Rate and Rhythm: Normal rate and regular rhythm.      Heart sounds: No " murmur heard.    No friction rub. No gallop.   Pulmonary:      Effort: No respiratory distress.      Breath sounds: Normal breath sounds.   Abdominal:      General: Bowel sounds are normal. There is no distension.      Palpations: Abdomen is soft. There is no mass.      Tenderness: There is no abdominal tenderness.      Hernia: No hernia is present.   Musculoskeletal:      Comments: Braces in place in the bilateral lower extremities   Skin:     General: Skin is warm and dry.      Findings: Rash present.      Comments: Erythema involving the palms overall improved with decrease in the degree of erythema on the palms of the hands and extension into the dorsal aspect of the hands.  There is significant dryness noted with skin that is cracked and flaking.  Sclerodactyly has improved somewhat.   Neurological:      Mental Status: She is alert and oriented to person, place, and time.      Cranial Nerves: No cranial nerve deficit.      Motor: No abnormal muscle tone.      Deep Tendon Reflexes: Reflexes normal.   Psychiatric:         Behavior: Behavior normal.     I have reexamined the patient and the results are consistent with the previously documented exam. LISA Waller       Result Review :     Results from last 7 days   Lab Units 03/24/23  1143   WBC 10*3/mm3 9.83   NEUTROS ABS 10*3/mm3 6.83   HEMOGLOBIN g/dL 14.0   HEMATOCRIT % 41.7   PLATELETS 10*3/mm3 308     Results from last 7 days   Lab Units 03/24/23  1143   SODIUM mmol/L 142   POTASSIUM mmol/L 3.9   CHLORIDE mmol/L 101   CO2 mmol/L 28.3   BUN mg/dL 23   CREATININE mg/dL 1.03*   CALCIUM mg/dL 8.9   ALBUMIN g/dL 4.2   BILIRUBIN mg/dL 0.7   ALK PHOS U/L 60   ALT (SGPT) U/L 12   AST (SGOT) U/L 21   GLUCOSE mg/dL 116*   MAGNESIUM mg/dL 2.0         STAT KUB performed with no obstruction noted  XR Abdomen KUB (03/24/2023 13:35)       Assessment and Plan     1. Previous Stage Ib ([id number]) right breast cancer:  · Diagnosed May 2010 with excisional  biopsy for invasive ductal carcinoma, 1.3 cm, grade 2, ER 90%, NH 80%, HER-2/peter negative (1+ IHC).    · Subsequent right mastectomy in July 2010 with no residual breast malignancy, 1/5 sentinel lymph node with micrometastasis (0.25 mm).    · Treated in the [facility] system with adjuvant AC ×4 cycles in 2010 (no taxanes administered due to underlying Charcot-Saloni-Tooth with peripheral neuropathy).    · Adjuvant Femara (postmenopausal) initiated October 2010 with plan to treat ×10 years.    · Genetic testing reportedly negative.    · Developed osteopenia treated with Prolia beginning 2/27/13. Subsequently discontinued due to identification of metastatic disease.  2. Recurrent/metastatic disease identified 10/8/17:  · Disease involving thoracic spine with cord compression at T6, lumbosacral involvement, sternal and right sternoclavicular involvement.    · Femara discontinued in 10/2017.    · Radiation administered (in the [facility] system) to the thoracic spine beginning 10/19/17 treating T3-T9 to a dose of 24 gray in 6 fractions.  · Evaluation with MRI 12/8/17 showing persistent T6 cord compression with persistent neurologic compromise requiring surgical treatment 12/11/17 with T6 laminectomy/corpectomy and T3-T9 fusion.  Pathology with metastatic carcinoma of breast origin, ER negative, NH negative, HER-2/peter negative (1+ IHC).  · Additional staging evaluation 12/8/17 with no evidence of visceral metastatic disease, bone scan showing involvement of thoracic spine, sternum, left humerus, mid frontal bone.  No plane film correlate of left humerus lesion.  MRI lumbar spine with small intradural L3 metastasis.  CA 15-3 12/6/17- 17.  · Palliative radiation therapy to L3 dural metastasis and left humerus initiated 1/15/18 (10 fractions), completed 1/26/18.  · Hypercalcemia of malignancy with calcium in the 10-11 range.  · Initiation of monthly Xgeva 1/23/18.  · Baseline CT scan 1/30/18 with no evidence of visceral involvement.   Cluster of nodular opacities in the right lower lobe suspected to be infectious or related to bronchiolitis. Bone scan 1/30/18 showed postsurgical change in the thoracic spine, stable uptake in the frontal bone, no new areas of disease.  · Initiation of palliative oral single agent Xeloda 2/7/18 2000 mg a.m., 1500 mg p.m. for 14/21 days.   · Following 3 cycles xeloda, bone scan 4/4/18 showed no change from the prior study.  CT scan 4/4/18 showed a small pericardial effusion of unclear significance as well as a subcutaneous nodule in the right lateral chest wall.  Subsequent evaluation with echocardiogram 4/17/18 showed no evidence of pericardial effusion.  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  We discussed the possibility of surgical excision of the right subcutaneous chest wall lesion for more definitive diagnosis.  Reviewed previous CT images dating back to 12/8/17 and the lesion was present even at that time measuring around 1.7 cm although not commented on in the radiology report.  As this appears to be an indolent low-grade process unrelated to her breast cancer, recommendee foregoing surgical excision at this time and monitoring this area on future scans.  The patient agreed.    · Following 6 cycles of Xeloda, CT 6/6/18  showed stable findings, no evidence of progressive disease.  There was a comment regarding subcutaneous abnormality in the anterior abdominal wall and this was related to Lovenox injection sites.  Bone scan 6/6/18 showed no interval change.   · CT scan and bone scan 8/13/18 following 9 cycles of Xeloda showed stable findings with no evidence of significant visceral metastases.  Her bone lesions appear stable on bone scan.  The spindle cell neoplasm in her right chest wall actually decreased in size from 2 cm down to 1.6 cm.    · The patient  experienced some symptoms of diarrhea, anorexia, generalized weakness during cycle 9 Xeloda it was unclear whether this was related to a viral gastroenteritis or toxicity from treatment.  Symptoms recurred during cycle 10 and treatment was cut short by 2 days.  Symptoms attributed to Xeloda.  With cycle 11, dose and schedule altered to 1500 mg twice daily for 7 days on followed by 7 days off .  · CT scan 9/9/2020 with no significant changes.  Bone scan 9/9/2020 read as unchanged from prior studies however did note an area of slight activity in the medial left femur.  Contacted radiology and although this was not noted on prior reports appears to have been present.  Subsequent MRI left femur 9/21/2020 with cortical thickening and periosteal edema left iliac us muscle insertion to the medial left femur with no evidence of metastatic disease, favored to represent periosteal change secondary to insertional tendinitis.  · Severe hand-foot symptoms causing sclerodactyly and limitation in finger movement prompted change in dosing in July 2021 with Xeloda decreased to 1000 mg a.m., 1500 mg p.m. for 7 days on followed by 7 days off.  · Patient was experiencing severe hand-foot syndrome related to Xeloda having developed skin peeling, sclerodactyly with limited use of her hands.  On 3/31/2022, Xeloda was held to allow for recovery.  Patient resumed Xeloda on 4/18/2022.  · Patient required hospitalization 5/29 - 6/1/2022 with presumed viral gastroenteritis that was exacerbated by Xeloda.  Xeloda held briefly, resumed on 6/6/2022.  · Patient again with worsening sclerodactyly, Xeloda held for 2 weeks from 10/3 - 10/17/2022, resumed at prior dose with improvement in symptoms.  · Most recent scans from 3/3/2023 with CT chest abdomen pelvis and bone scan showing stable findings.  · Triage visit 3/24/2023 with intractable nausea vomiting.  Typical schedule Xeloda 1000 mg a.m., 1500 mg p.m. 7 days on followed by 7 days off.  She was  due to be taking her Xeloda this week though due to nausea vomiting has not been able to tolerate this.  KUB with no obvious obstruction noted.  This is suspected to be viral gastroenteritis.  Supportive care with IV fluids, antiemetics.  Xeloda will remain on hold and she will resume next week at her regular schedule of 7 days on, 7 days off.  She will return in 2 weeks labs and Xgeva and Dr. Hancock will see her in 2 months for follow-up.  We will anticipate repeat scans at a 4-month interval again.  3. Right pulmonary embolism:  · Diagnosed on CT angiogram 10/21/17 involving small right lower lobe pulmonary artery.  Lower extremity Dopplers negative.  · Bilateral lower extremity Dopplers negative again 12/5/17.  · Received chronic Lovenox 1 mg/kg twice per day, transition to oral Eliquis in February 2019, continuing on Eliquis 5 mg twice daily.  · Patient continues on anticoagulation without any complications.  4. Cancer related pain:  · Previously receiving Duragesic 50 µg patch every 72 hours along with Dilaudid 4 mg as needed for breakthrough pain  · The patient's pain improved over time and she was able to discontinue both Duragesic and Dilaudid in the interval.  · Patient does take occasional Flexeril at bedtime due to back spasm/pain when she has been more active.  · The patient does have some occasional aggravation of her chronic back pain and does use tramadol 50 mg every 8 hours as needed  · Patient does continue to use tramadol 50 mg every 8-12 hours as needed.  She is primarily experiencing pain in her left shoulder, notes that the tramadol has not been effective recently.  This is a chronic pain which is increased slightly in severity however quality remains consistent.  We will prescribe hydrocodone 5/325 every 4 hours as needed (#60).  I did recommend that she return to see Dr. Garcia in orthopedics to see if there were any recommendations regarding physical therapy or injection as opposed to shoulder  replacement which had been recommended previously.  5. Chemotherapy-induced diarrhea with subsequent C. difficile colitis in the setting of previous ulcerative colitis and then identification of enteropathogenic E. coli:  · Patient with reported history of ulcerative colitis, continues on mesalamine.  · The patient developed initial diarrhea related to Xeloda at regular dosing.  · Symptoms improved on reduced dose Xeloda  · Flare of symptoms in October 2018 with apparent finding of C. difficile colitis by GI, treated with course of oral vancomycin with improvement in symptoms.  · Patient notes minimal intermittent diarrhea/loose stools on Xeloda requiring occasional dosing of Imodium.    · Patient required hospitalization 5/29 - 6/1/2022 with presumed viral gastroenteritis that was exacerbated by Xeloda.  Xeloda held briefly, resumed on 6/6/2022.  · Patient's  developed C. difficile colitis and patient was experiencing increase in diarrhea.  Stool studies performed 8/4/22 negative C. difficile however GI PCR was positive for enteropathogenic E. coli.  Given patient's symptoms and immunocompromise status it was elected to treat her with azithromycin 500 mg daily x3 days beginning 8/5/2022  · Diarrhea resolved  · Patient underwent colonoscopy on 2/28/2023 with findings of diverticulosis  · She is currently without diarrhea.  She did have some abdominal cramping earlier in the week though had small-volume hard stools.  6. Traumatic left tibia/fibular fracture:  · Status post ORIF 12/6/17  · Specimen was sent for pathologic review, negative for evidence of malignancy  7. Hypercalcemia:  · Suspect hypercalcemia of malignancy, calcium in  10-11 range previously.  · Calcium normalized following initiation of monthly Xgeva on 1/23/18.  8. Chemotherapy-induced mucositis:  · Patient had a minimal degree of mucositis with cycle 2.  The patient has magic mouthwash to use as needed.  No subsequent mucositis.  9. Recurrent  UTI, bladder wall thickening on CT:  · Patient had an enterococcal UTI on 3/2/18 sensitive to nitrofurantoin and received treatment, unclear how long.  · Recurrent UTI 3/20/18 with urine culture growing Klebsiella, initially treated with nitrofurantoin, transitioned to Levaquin.  · CT 4/4/18 with diffuse bladder wall thickening with increased nodular thickening at the left base.  Referral to urogynecology Dr. May Johnson.  She was placed on a prophylactic dose of trimethoprim 100 mg daily, bladder wall thickening felt to be related to recent recurrent urinary tract infections.  · Patient with urinary symptoms, treated with course of Macrobid at the end of December 2018, urine culture however was negative 12/31/18.  · Patient was found to have Klebsiella UTI 7/29/2019 which was successfully treated with Macrobid with complete resolution of symptoms.  · CT 8/10/2021 with diffuse bladder wall thickening new from 5/17/2021 (however seen on multiple prior scans).    · UTI on 10/18/2021 with E. coli greater than 100,000 colonies that was pansensitive, treated with Macrobid x7 days  · With ongoing/recurrent UTIs patient was seen by urogynecology and initiated suppressive therapy with trimethoprim 100 mg daily in December 2021.  She has not experienced any further urinary tract infections.  · CT abdomen pelvis 3/28/2022 does show diffuse thickening of urinary bladder as has been seen on prior studies indicative of cystitis.  · Patient notes that she did stop taking trimethoprim on a daily basis.  She did follow-up recently with urogynecology, aware that she is off of suppressive antibiotics.  Fortunately however she has not experienced any recurrent UTIs recently.  10.   Mobility:  · The patient underwent an intensive course of rehabilitation at [facility].  She graduated from her outpatient course November 2018.  · Overall the patient has improved dramatically in terms of mobility,   · Patient had been walking for exercise on  a regular basis but reports she has not been doing this as much recently due to the cold weather and responsibilities in caring for family members.  11.  Depression:  · The patient is continuing follow-up in the supportive oncology clinic and is currently continuing on Cymbalta 30 mg twice daily as well as gabapentin 300 mg at dinner and 300 mg nightly, temazepam 15 mg nightly as needed, Provigil 100 mg daily.  · Patient does continue to attend support groups at [employer].  · The patient does continue routine follow-up in supportive oncology clinic.    · Patient does have multiple stressors with her 's malignancy and chemotherapy as well as her autistic son who is living with them at home.  · Patient continues to have difficulty with stress and anxiety, is being followed by supportive oncology, last seen on 2/23/2023.  12. 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  Symptoms did improve and she continued on the same dose.    · Patient was referred to dermatology and has been continuing on triamcinolone 0.1% cream used for 1 week on followed by 1 week off which led to some further improvement.   · Progressive palmar erythema with development of sclerodactyly and effect on dexterity.  Addition of urea-based cream 3 times daily.  · Patient with continued difficulty regarding erythema of her hands that extended onto the dorsal aspect and was causing contractures/sclerodactyly in her fingers affecting her dexterity.  In July 2021, held Xeloda for an additional week and then reduced dose from 1500 mg twice daily down to 1000 mg a.m. and 1500 mg p.m. and continued on a 7-day on followed by 7-day off schedule.  · With reduction in Xeloda dose, slight improvement in erythema involving dorsal aspect of the hands and slight decrease in  sclerodactyly  · Patient was experiencing severe hand-foot syndrome related to Xeloda having developed skin peeling, sclerodactyly with limited use of her hands.  On 3/31/2022, Xeloda was held to allow for recovery.  Patient resumed Xeloda on 4/18/2022.  · Patient developed worsening sclerodactyly affecting dexterity that required Xeloda to again be briefly held for 2 weeks from 10/3 - 10/17/2022.  Treatment resumed at prior dose after improvement in symptoms.  · Patient continues to use emollient cream frequently (currently 5 times daily) and topical triamcinolone 0.1% twice daily intermittently (2 weeks on followed by 2 weeks off as instructed by dermatology).  Symptoms currently slightly improved with decrease in erythema on the palms and dorsal aspect of the hands although there is a slight increase in scaling and dry skin.  Sclerodactyly persists but is somewhat improved and does not interfere significantly with her dexterity currently.  13. Evidence of steatosis on scans with mild intermittent elevated liver function studies:  · Liver function studies increased 8/20/19 with ALT 98, AST 70, normal total bilirubin.  · Negative viral hepatitis A, B, and C panel 8/23/18  · Likely related to hepatic steatosis.   · Subsequent improvement in LFTs  · LFTs today are normal  14. Chemotherapy induced leukopenia:  · WBC today is stable at 9.83  15. GERD, question of celiac disease:  · Patient with significant history of reflux  · Patient currently receiving Protonix 40 mg daily daily and Carafate 1 g 4 times daily as needed  · Patient required hospitalization 5/29 - 6/1/2022 with presumed viral gastroenteritis that was exacerbated by Xeloda.    · EGD performed 5/31/2022 during hospitalization with biopsy that showed focal blunting of villi and atrophy with slight increase in intraepithelial lymphocytes.  Changes were minimal but recommended correlation with serology to exclude celiac disease.  · Celiac serology 8/8/2022  negative  · Patient previously developed worsening reflux symptoms, temporarily increase Protonix to 40 mg twice daily and we did add Carafate 1 g 4 times daily.    · She is taking Protonix 40 mg once daily, is not taking Carafate.  16. Insomnia:  · Patient with prior paradoxical reaction to Benadryl  · Improved previously on temazepam 15 mg nightly as needed.   · Patient noted subsequently that temazepam was having no effect.   · Symptoms currently stable on gabapentin 300 mg at dinner and 300 mg nightly as well as temazepam 15 mg nightly as needed  17. Health maintenance:  · Patient notes that she has a history of colon polyps as well as ulcerative colitis and was due for a follow-up colonoscopy on 9/12/2019.  We did discuss there is no necessity to pursue colonoscopy in the setting of her metastatic breast cancer.    · The patient did undergo colonoscopy on 2/7/2020 with findings of muscular hypertrophy and diverticulosis.   · Patient did wish to proceed with further colonoscopic evaluation, performed on 12/20/2022 with poor prep.  Repeat 2/28/2023 with diverticulosis.  18. Bilateral shoulder pain:  · Chronic difficulty with right shoulder although she has not complained of this in prior visits to our office.  She reported difficulty with abduction.    · The patient did undergo MRI of her right shoulder on 11/21/2019 at an outside facility showing multiple abnormalities including supraspinatus tendinosis, labral tear but no evidence of metastatic disease.  · Patient developed pain in the left shoulder, was seen by orthopedics and underwent steroid injection with some improvement.  Right shoulder stable.  Patient did undergo physical therapy with some improvement.  She continues to use tramadol 50 mg every 8-12 hours as needed.  · Note that current CT 10/20/2022 made notation of left shoulder probable bursitis.    · Patient with increased discomfort of left shoulder recently.  Tramadol helps however she is  requesting something else for pain control and I will prescribe hydrocodone 5/325 every 4 hours as needed (#60).  I encouraged her to return to Dr. Garcia in orthopedics.  He had previously recommended shoulder replacement however unclear whether additional measures such as physical therapy or additional injection may be effective.  19. Ocular changes in part related to Xeloda:  · Patient experienced a mild degree of blurred vision as well as burning and pruritus.  · She was seen by her ophthalmologist and was placed on xiidra ophthalmic drops which have helped.  · Likely both issues are to some extent related to Xeloda.  · Symptoms did worsen and patient was seen by a new ophthalmologist at [facility].  She is now using an ocular lubricant at night in addition to artificial tears which have helped.  Symptoms currently stable to improved  20. Elevated glucose:  · It is noted the patient's glucose at the last few visits has been in the high 100 range, postprandial.  · She has had a hemoglobin A1c that has been in the high 5-6 range in the past, on 5/1/2019 at 5.7  · Hemoglobin A1c was last checked in 11/12/2021 at 5.8  21. Possible loosening of pedicle screw at T3:  · CT cervical spine 5/17/2021 showed loosening of the left pedicle screw at T3.  Patient referred to orthopedics and was seen by Dr. Nayak who felt that there were no concerning findings on the scan  22. Iron deficiency anemia  · Labs on 5/2/2022 with hemoglobin 10.8.  Additional labs with iron 48, ferritin 21.2, iron saturation 11%, TIBC 4 and 32, folate greater than 20, B12 greater than 2000.    · Attempted treatment with oral iron daily however patient was intolerant  · Patient subsequently received Venofer 300 mg weekly x4 beginning 6/6/2022 and completed on 6/27/2022  · Subsequent iron studies on 7/11/2022 showed improvement with iron 62, ferritin 345, iron saturation 18%, TIBC 336.  Hemoglobin had improved up to 12.7 at that  time.  · Repeat iron studies on 10/3/2022 showed iron replete status with hemoglobin 13.7, iron 121, ferritin 208.7, iron saturation 31%, TIBC 392.  · Hemoglobin currently normal at 14.0  23. Suspected viral gastroenteritis  · Nausea vomiting x1 week with intermittent abdominal cramping though no diarrhea.  Afebrile, without neutropenia  · This was a week she was supposed to be taking Xeloda though has not tolerated this due to nausea vomiting  · KUB without signs of obstruction  · Clinically no distention or abdominal tenderness  · Supportive care with IV fluids and antiemetics in the infusion area today     Plan:  1. In the infusion area today the patient received 1 L normal saline, 8 mg IV Zofran and 20 mg IV Pepcid  2. Stat KUB performed without evidence of obstruction  3. Xeloda will remain on hold until Monday, 3/27/2023.  If nausea vomiting symptoms have resolved, she will resume at previous schedule of 1000 mg in the morning 1500 mg in the evening 7 days on, 7 days off  4. If nausea vomiting persist, we will proceed with CT imaging  5. Continue Eliquis 5 mg twice daily  6. The patient will continue frequent use of emollient cream currently 5 times daily and continue periodic triamcinolone cream 0.1% twice daily (provided by dermatology) 2 weeks on followed by 2 weeks off as prescribed  7. Continue Protonix 40 mg daily  8. Continue ocular lubricating gel nightly per ophthalmology  9. Continue Provigil 100 mg daily and Cymbalta 30 mg twice daily, gabapentin 300 mg at dinner and 300 mg at night.  Patient continues routine follow-up with supportive oncology   10. Continue temazepam 15 mg nightly.  11. Patient continues on tramadol 50 mg every 8 hours as needed for pain  12. Patient cautioned regarding use of over-the-counter nonsteroidal medications  13. Continue hydrocodone 5/325 every 4 hours as needed  14. The patient will schedule follow-up appointment with Dr. Garcia in orthopedics regarding her left  shoulder  15. In 2 weeks CBC, CMP, magnesium, phosphorus and Xgeva  16. MD visit in 6 weeks with CBC, CMP, magnesium, phosphorus and Xgeva.  We will discuss timeframe for follow-up scans possibly at a 4-month interval from current studies.     Patient continuing on high risk medication requiring intensive monitoring.   Today's plan of care was discussed reviewed with Dr. Hancock who is in agreement    I spent 40 minutes caring for Martha on this date of service. This time includes time spent by me in the following activities: preparing for the visit, reviewing tests, obtaining and/or reviewing a separately obtained history, performing a medically appropriate examination and/or evaluation, counseling and educating the patient/family/caregiver, ordering medications, tests, or procedures, referring and communicating with other health care professionals, documenting information in the medical record, independently interpreting results and communicating that information with the patient/family/caregiver and care coordination.     .Kylah Corrales, APRN  03/24/2023

## 2023-03-28 ENCOUNTER — SPECIALTY PHARMACY (OUTPATIENT)
Dept: PHARMACY | Facility: HOSPITAL | Age: 63
End: 2023-03-28
Payer: MEDICARE

## 2023-03-28 RX ORDER — GABAPENTIN 300 MG/1
CAPSULE ORAL
Qty: 180 CAPSULE | Refills: 3 | Status: SHIPPED | OUTPATIENT
Start: 2023-03-28

## 2023-03-28 NOTE — PROGRESS NOTES
"Pt has a full month supply at home.  I will call for a refill coordination during her next \"on\" week.  This will be the week of April 10.  "

## 2023-03-29 ENCOUNTER — OFFICE VISIT (OUTPATIENT)
Dept: PSYCHIATRY | Facility: HOSPITAL | Age: 63
End: 2023-03-29
Payer: MEDICARE

## 2023-03-29 DIAGNOSIS — F33.1 MAJOR DEPRESSIVE DISORDER, RECURRENT EPISODE, MODERATE: ICD-10-CM

## 2023-03-29 DIAGNOSIS — C50.919 METASTATIC BREAST CANCER: ICD-10-CM

## 2023-03-29 DIAGNOSIS — R53.0 NEOPLASTIC MALIGNANT RELATED FATIGUE: Primary | ICD-10-CM

## 2023-03-29 DIAGNOSIS — F41.1 GENERALIZED ANXIETY DISORDER: ICD-10-CM

## 2023-03-29 PROCEDURE — 99214 OFFICE O/P EST MOD 30 MIN: CPT | Performed by: NURSE PRACTITIONER

## 2023-03-29 RX ORDER — TEMAZEPAM 15 MG/1
15 CAPSULE ORAL NIGHTLY PRN
Qty: 30 CAPSULE | Refills: 1 | Status: SHIPPED | OUTPATIENT
Start: 2023-03-29

## 2023-03-29 NOTE — PROGRESS NOTES
[facility]  In Person Session    Subjective  Patient ID: Martha Davey is a 62 y.o. female is seen face to face in the [facility].    CC: anxiety, depression    HPI/ Interval History:   Pt is seen in follow up alongside metastatic breast cancer. Pt continues with various caregiver responsibilities. Continues to work to set personal boundaries, seeing self as fixer, but allowing personal care as well. Pt reports stable to improved sx of depression, reporting smiling more, and confirming increased energy. Denies additional target sx at this time.    Exam: As above    Recent Labs Reviewed:  Infusion on 03/24/2023   Component Date Value   • Glucose 03/24/2023 116 (H)    • BUN 03/24/2023 23    • Creatinine 03/24/2023 1.03 (H)    • Sodium 03/24/2023 142    • Potassium 03/24/2023 3.9    • Chloride 03/24/2023 101    • CO2 03/24/2023 28.3    • Calcium 03/24/2023 8.9    • Total Protein 03/24/2023 7.1    • Albumin 03/24/2023 4.2    • ALT (SGPT) 03/24/2023 12    • AST (SGOT) 03/24/2023 21    • Alkaline Phosphatase 03/24/2023 60    • Total Bilirubin 03/24/2023 0.7    • Globulin 03/24/2023 2.9    • A/G Ratio 03/24/2023 1.4    • BUN/Creatinine Ratio 03/24/2023 22.3    • Anion Gap 03/24/2023 12.7    • eGFR 03/24/2023 61.6    • Magnesium 03/24/2023 2.0    • WBC 03/24/2023 9.83    • RBC 03/24/2023 4.03    • Hemoglobin 03/24/2023 14.0    • Hematocrit 03/24/2023 41.7    • MCV 03/24/2023 103.5 (H)    • MCH 03/24/2023 34.7 (H)    • MCHC 03/24/2023 33.6    • RDW 03/24/2023 13.8    • RDW-SD 03/24/2023 51.9    • MPV 03/24/2023 10.5    • Platelets 03/24/2023 308    • Neutrophil % 03/24/2023 69.6    • Lymphocyte % 03/24/2023 18.6 (L)    • Monocyte % 03/24/2023 8.6    • Eosinophil % 03/24/2023 2.2    • Basophil % 03/24/2023 0.8    • Immature Grans % 03/24/2023 0.2    • Neutrophils, Absolute 03/24/2023 6.83    • Lymphocytes, Absolute 03/24/2023 1.83    • Monocytes, Absolute 03/24/2023 0.85    •  Eosinophils, Absolute 03/24/2023 0.22    • Basophils, Absolute 03/24/2023 0.08    • Immature Grans, Absolute 03/24/2023 0.02    • nRBC 03/24/2023 0.0    Lab on 03/13/2023   Component Date Value   • Glucose 03/13/2023 116 (H)    • BUN 03/13/2023 19    • Creatinine 03/13/2023 0.87    • Sodium 03/13/2023 141    • Potassium 03/13/2023 4.9    • Chloride 03/13/2023 102    • CO2 03/13/2023 31.0 (H)    • Calcium 03/13/2023 9.4    • Total Protein 03/13/2023 6.6    • Albumin 03/13/2023 3.9    • ALT (SGPT) 03/13/2023 15    • AST (SGOT) 03/13/2023 20    • Alkaline Phosphatase 03/13/2023 59    • Total Bilirubin 03/13/2023 0.4    • Globulin 03/13/2023 2.7    • A/G Ratio 03/13/2023 1.4    • BUN/Creatinine Ratio 03/13/2023 21.8    • Anion Gap 03/13/2023 8.0    • eGFR 03/13/2023 75.4    • Magnesium 03/13/2023 1.8    • Phosphorus 03/13/2023 3.4    • WBC 03/13/2023 6.44    • RBC 03/13/2023 3.54 (L)    • Hemoglobin 03/13/2023 12.1    • Hematocrit 03/13/2023 37.6    • MCV 03/13/2023 106.2 (H)    • MCH 03/13/2023 34.2 (H)    • MCHC 03/13/2023 32.2    • RDW 03/13/2023 13.6    • RDW-SD 03/13/2023 53.5    • MPV 03/13/2023 10.4    • Platelets 03/13/2023 234    • Neutrophil % 03/13/2023 71.8    • Lymphocyte % 03/13/2023 16.9 (L)    • Monocyte % 03/13/2023 7.9    • Eosinophil % 03/13/2023 2.3    • Basophil % 03/13/2023 0.8    • Immature Grans % 03/13/2023 0.3    • Neutrophils, Absolute 03/13/2023 4.62    • Lymphocytes, Absolute 03/13/2023 1.09    • Monocytes, Absolute 03/13/2023 0.51    • Eosinophils, Absolute 03/13/2023 0.15    • Basophils, Absolute 03/13/2023 0.05    • Immature Grans, Absolute 03/13/2023 0.02    • nRBC 03/13/2023 0.0    • Spherocytes 03/13/2023 Slight/1+    • WBC Morphology 03/13/2023 Normal    • Platelet Morphology 03/13/2023 Normal    Hospital Outpatient Visit on 03/03/2023   Component Date Value   • Creatinine 03/03/2023 1.00    labs reviewed    Current Psychotropic Medications:  cymbalta 60 mg daily   Modafinil 200 mg q  hs    Plan of Care/ Medical Decision Making  Continue cymbalta for well managed sx of depression.  Continue modafinil 100 mg q am  Supported current boundaries and limitations. Supported pt in role as patient and caregiver.  Follow up arranged in 4 weeks.    Diagnoses and all orders for this visit:    1. Neoplastic malignant related fatigue (Primary)    2. Major depressive disorder, recurrent episode, moderate (HCC)    3. Metastatic breast cancer    4. Generalized anxiety disorder    I spent 31 minutes caring for Martha on this date of service.

## 2023-04-06 DIAGNOSIS — G89.3 CANCER ASSOCIATED PAIN: Primary | ICD-10-CM

## 2023-04-06 RX ORDER — TRAMADOL HYDROCHLORIDE 50 MG/1
TABLET ORAL
Qty: 90 TABLET | Refills: 0 | Status: SHIPPED | OUTPATIENT
Start: 2023-04-06

## 2023-04-10 ENCOUNTER — SPECIALTY PHARMACY (OUTPATIENT)
Dept: PHARMACY | Facility: HOSPITAL | Age: 63
End: 2023-04-10
Payer: MEDICARE

## 2023-04-10 ENCOUNTER — LAB (OUTPATIENT)
Dept: OTHER | Facility: HOSPITAL | Age: 63
End: 2023-04-10
Payer: MEDICARE

## 2023-04-10 ENCOUNTER — INFUSION (OUTPATIENT)
Dept: ONCOLOGY | Facility: HOSPITAL | Age: 63
End: 2023-04-10
Payer: MEDICARE

## 2023-04-10 DIAGNOSIS — Z17.1 MALIGNANT NEOPLASM OF OVERLAPPING SITES OF RIGHT BREAST IN FEMALE, ESTROGEN RECEPTOR NEGATIVE: ICD-10-CM

## 2023-04-10 DIAGNOSIS — C50.811 MALIGNANT NEOPLASM OF OVERLAPPING SITES OF RIGHT BREAST IN FEMALE, ESTROGEN RECEPTOR NEGATIVE: Primary | ICD-10-CM

## 2023-04-10 DIAGNOSIS — Z17.1 MALIGNANT NEOPLASM OF OVERLAPPING SITES OF RIGHT BREAST IN FEMALE, ESTROGEN RECEPTOR NEGATIVE: Primary | ICD-10-CM

## 2023-04-10 DIAGNOSIS — C50.811 MALIGNANT NEOPLASM OF OVERLAPPING SITES OF RIGHT BREAST IN FEMALE, ESTROGEN RECEPTOR NEGATIVE: ICD-10-CM

## 2023-04-10 LAB
ALBUMIN SERPL-MCNC: 4.2 G/DL (ref 3.5–5.2)
ALBUMIN/GLOB SERPL: 1.6 G/DL
ALP SERPL-CCNC: 56 U/L (ref 39–117)
ALT SERPL W P-5'-P-CCNC: 13 U/L (ref 1–33)
ANION GAP SERPL CALCULATED.3IONS-SCNC: 8.8 MMOL/L (ref 5–15)
AST SERPL-CCNC: 22 U/L (ref 1–32)
BASOPHILS # BLD AUTO: 0.06 10*3/MM3 (ref 0–0.2)
BASOPHILS NFR BLD AUTO: 0.5 % (ref 0–1.5)
BILIRUB SERPL-MCNC: 0.3 MG/DL (ref 0–1.2)
BUN SERPL-MCNC: 25 MG/DL (ref 8–23)
BUN/CREAT SERPL: 27.8 (ref 7–25)
CALCIUM SPEC-SCNC: 9.9 MG/DL (ref 8.6–10.5)
CHLORIDE SERPL-SCNC: 105 MMOL/L (ref 98–107)
CO2 SERPL-SCNC: 28.2 MMOL/L (ref 22–29)
CREAT SERPL-MCNC: 0.9 MG/DL (ref 0.57–1)
DEPRECATED RDW RBC AUTO: 52.1 FL (ref 37–54)
EGFRCR SERPLBLD CKD-EPI 2021: 72.4 ML/MIN/1.73
EOSINOPHIL # BLD AUTO: 0.14 10*3/MM3 (ref 0–0.4)
EOSINOPHIL NFR BLD AUTO: 1.1 % (ref 0.3–6.2)
ERYTHROCYTE [DISTWIDTH] IN BLOOD BY AUTOMATED COUNT: 13.9 % (ref 12.3–15.4)
GLOBULIN UR ELPH-MCNC: 2.7 GM/DL
GLUCOSE SERPL-MCNC: 138 MG/DL (ref 65–99)
HCT VFR BLD AUTO: 40.2 % (ref 34–46.6)
HGB BLD-MCNC: 13.4 G/DL (ref 12–15.9)
IMM GRANULOCYTES # BLD AUTO: 0.03 10*3/MM3 (ref 0–0.05)
IMM GRANULOCYTES NFR BLD AUTO: 0.2 % (ref 0–0.5)
LYMPHOCYTES # BLD AUTO: 1 10*3/MM3 (ref 0.7–3.1)
LYMPHOCYTES NFR BLD AUTO: 7.9 % (ref 19.6–45.3)
MAGNESIUM SERPL-MCNC: 1.9 MG/DL (ref 1.6–2.4)
MCH RBC QN AUTO: 34.2 PG (ref 26.6–33)
MCHC RBC AUTO-ENTMCNC: 33.3 G/DL (ref 31.5–35.7)
MCV RBC AUTO: 102.6 FL (ref 79–97)
MONOCYTES # BLD AUTO: 0.72 10*3/MM3 (ref 0.1–0.9)
MONOCYTES NFR BLD AUTO: 5.7 % (ref 5–12)
NEUTROPHILS NFR BLD AUTO: 10.73 10*3/MM3 (ref 1.7–7)
NEUTROPHILS NFR BLD AUTO: 84.6 % (ref 42.7–76)
NRBC BLD AUTO-RTO: 0 /100 WBC (ref 0–0.2)
PHOSPHATE SERPL-MCNC: 3.7 MG/DL (ref 2.5–4.5)
PLATELET # BLD AUTO: 259 10*3/MM3 (ref 140–450)
PMV BLD AUTO: 10.5 FL (ref 6–12)
POTASSIUM SERPL-SCNC: 4.4 MMOL/L (ref 3.5–5.2)
PROT SERPL-MCNC: 6.9 G/DL (ref 6–8.5)
RBC # BLD AUTO: 3.92 10*6/MM3 (ref 3.77–5.28)
SODIUM SERPL-SCNC: 142 MMOL/L (ref 136–145)
WBC NRBC COR # BLD: 12.68 10*3/MM3 (ref 3.4–10.8)

## 2023-04-10 PROCEDURE — 80053 COMPREHEN METABOLIC PANEL: CPT | Performed by: INTERNAL MEDICINE

## 2023-04-10 PROCEDURE — 83735 ASSAY OF MAGNESIUM: CPT | Performed by: INTERNAL MEDICINE

## 2023-04-10 PROCEDURE — 96372 THER/PROPH/DIAG INJ SC/IM: CPT

## 2023-04-10 PROCEDURE — 36415 COLL VENOUS BLD VENIPUNCTURE: CPT

## 2023-04-10 PROCEDURE — 84100 ASSAY OF PHOSPHORUS: CPT | Performed by: INTERNAL MEDICINE

## 2023-04-10 PROCEDURE — 25010000002 DENOSUMAB 120 MG/1.7ML SOLUTION: Performed by: INTERNAL MEDICINE

## 2023-04-10 PROCEDURE — 85025 COMPLETE CBC W/AUTO DIFF WBC: CPT | Performed by: INTERNAL MEDICINE

## 2023-04-10 RX ADMIN — DENOSUMAB 120 MG: 120 INJECTION SUBCUTANEOUS at 12:27

## 2023-04-10 NOTE — PROGRESS NOTES
Specialty Pharmacy Refill Coordination Note     Martha is a 62 y.o. female contacted today regarding refills of  Xeloda specialty medication(s).    Reviewed and verified with patient:       Specialty medication(s) and dose(s) confirmed: yes    Refill Questions    Flowsheet Row Most Recent Value   Changes to allergies? No   Changes to medications? No   New conditions since last clinic visit No   Unplanned office visit, urgent care, ED, or hospital admission in the last 4 weeks  No   How does patient/caregiver feel medication is working? Good   Financial problems or insurance changes  No   Since the previous refill, were any specialty medication doses or scheduled injections missed or delayed?  Yes   If yes, please provide the amount 1- am dose   Why were doses missed? unknown   Does this patient require a clinical escalation to a pharmacist? No          Delivery Questions    Flowsheet Row Most Recent Value   Delivery method [employer]  [[employer] priority sig required ship 4/13 deliver 4/14]   Delivery address correct? Yes   Preferred delivery time? AM   Number of medications in delivery 1   Medication being filled and delivered Xeloda   Doses left of specialty medications 1 week- starts today, then off week   Is there any medication that is due not being filled? No   Supplies needed? No supplies needed   Cooler needed? No   Do any medications need mixed or dated? No   Copay form of payment Payment plan already set up   Questions or concerns for the pharmacist? No   Are any medications first time fills? No                 Follow-up: 3 week(s)     Ami Hudson  Specialty Pharmacy Technician

## 2023-05-01 ENCOUNTER — TELEPHONE (OUTPATIENT)
Dept: ONCOLOGY | Facility: CLINIC | Age: 63
End: 2023-05-01

## 2023-05-01 RX ORDER — LOSARTAN POTASSIUM 50 MG/1
50 TABLET ORAL DAILY
Qty: 90 TABLET | Refills: 3 | OUTPATIENT
Start: 2023-05-01

## 2023-05-01 NOTE — TELEPHONE ENCOUNTER
Caller: Martha Davey    Relationship: Self    Best call back number: [phone]    What is the best time to reach you: ANYTIME. LEAVE VM IF NEEDED.    Who are you requesting to speak with (clinical staff, provider,  specific staff member): SCHEDULING        What was the call regarding:PATIENT MARTHA CALLED TO CLARIFY IF HER APPT WITH DR RICHARDS ON 5/8/23 IS NECESSARY. HER  ALSO HAS APPTS AT [facility] ON THE SAME DAY AT THE [facility] LOCATION. THEY ONLY HAVE 1 CAR AND IT IS CREATING A CONFLICT FOR APPTS.    Do you require a callback: YES

## 2023-05-05 ENCOUNTER — SPECIALTY PHARMACY (OUTPATIENT)
Dept: PHARMACY | Facility: HOSPITAL | Age: 63
End: 2023-05-05
Payer: MEDICARE

## 2023-05-05 RX ORDER — DULOXETIN HYDROCHLORIDE 30 MG/1
CAPSULE, DELAYED RELEASE ORAL
Qty: 180 CAPSULE | Refills: 0 | Status: SHIPPED | OUTPATIENT
Start: 2023-05-05

## 2023-05-05 NOTE — PROGRESS NOTES
Specialty Pharmacy Refill Coordination Note     Martha is a 62 y.o. female contacted today regarding refills of  Xeloda specialty medication(s).    Reviewed and verified with patient:       Specialty medication(s) and dose(s) confirmed: yes    Refill Questions    Flowsheet Row Most Recent Value   Changes to allergies? No   Changes to medications? No   New conditions since last clinic visit No   Unplanned office visit, urgent care, ED, or hospital admission in the last 4 weeks  No   How does patient/caregiver feel medication is working? Good   Financial problems or insurance changes  No   Since the previous refill, were any specialty medication doses or scheduled injections missed or delayed?  Yes   If yes, please provide the amount 1 dose   Why were doses missed? forgot- timing issue- no food on stomach   Does this patient require a clinical escalation to a pharmacist? No          Delivery Questions    Flowsheet Row Most Recent Value   Delivery method Other (Comment)  [[employer] to home address sig required- ship 5/8 deliver 5/9]   Delivery address correct? Yes   Preferred delivery time? Anytime   Number of medications in delivery 1   Medication being filled and delivered Xeloda   Doses left of specialty medications 1 week- then one week off   Is there any medication that is due not being filled? No   Supplies needed? No supplies needed   Cooler needed? No   Do any medications need mixed or dated? No   Copay form of payment Payment plan already set up   Questions or concerns for the pharmacist? No   Are any medications first time fills? No        Delivering a little early due to vacation.          Follow-up: 3 week(s)     Ami Hudson  Specialty Pharmacy Technician

## 2023-05-08 ENCOUNTER — LAB (OUTPATIENT)
Dept: LAB | Facility: HOSPITAL | Age: 63
End: 2023-05-08
Payer: MEDICARE

## 2023-05-08 ENCOUNTER — OFFICE VISIT (OUTPATIENT)
Dept: ONCOLOGY | Facility: CLINIC | Age: 63
End: 2023-05-08
Payer: MEDICARE

## 2023-05-08 ENCOUNTER — INFUSION (OUTPATIENT)
Dept: ONCOLOGY | Facility: HOSPITAL | Age: 63
End: 2023-05-08
Payer: MEDICARE

## 2023-05-08 VITALS
HEIGHT: 62 IN | WEIGHT: 178.5 LBS | HEART RATE: 90 BPM | TEMPERATURE: 98.2 F | OXYGEN SATURATION: 95 % | BODY MASS INDEX: 32.85 KG/M2 | DIASTOLIC BLOOD PRESSURE: 83 MMHG | SYSTOLIC BLOOD PRESSURE: 122 MMHG | RESPIRATION RATE: 16 BRPM

## 2023-05-08 DIAGNOSIS — Z17.1 MALIGNANT NEOPLASM OF OVERLAPPING SITES OF RIGHT BREAST IN FEMALE, ESTROGEN RECEPTOR NEGATIVE: ICD-10-CM

## 2023-05-08 DIAGNOSIS — C50.811 MALIGNANT NEOPLASM OF OVERLAPPING SITES OF RIGHT BREAST IN FEMALE, ESTROGEN RECEPTOR NEGATIVE: ICD-10-CM

## 2023-05-08 DIAGNOSIS — Z17.1 MALIGNANT NEOPLASM OF OVERLAPPING SITES OF RIGHT BREAST IN FEMALE, ESTROGEN RECEPTOR NEGATIVE: Primary | ICD-10-CM

## 2023-05-08 DIAGNOSIS — C50.919 PRIMARY MALIGNANT NEOPLASM OF BREAST WITH METASTASIS: ICD-10-CM

## 2023-05-08 DIAGNOSIS — C50.811 MALIGNANT NEOPLASM OF OVERLAPPING SITES OF RIGHT BREAST IN FEMALE, ESTROGEN RECEPTOR NEGATIVE: Primary | ICD-10-CM

## 2023-05-08 DIAGNOSIS — Z79.899 HIGH RISK MEDICATION USE: ICD-10-CM

## 2023-05-08 DIAGNOSIS — L27.1 PALMAR PLANTAR ERYTHRODYSAESTHESIA DUE TO CYTOTOXIC THERAPY: ICD-10-CM

## 2023-05-08 LAB
ALBUMIN SERPL-MCNC: 4.4 G/DL (ref 3.5–5.2)
ALBUMIN/GLOB SERPL: 1.8 G/DL (ref 1.1–2.4)
ALP SERPL-CCNC: 54 U/L (ref 38–116)
ALT SERPL W P-5'-P-CCNC: 16 U/L (ref 0–33)
ANION GAP SERPL CALCULATED.3IONS-SCNC: 10.4 MMOL/L (ref 5–15)
AST SERPL-CCNC: 25 U/L (ref 0–32)
BASOPHILS # BLD AUTO: 0.04 10*3/MM3 (ref 0–0.2)
BASOPHILS NFR BLD AUTO: 0.8 % (ref 0–1.5)
BILIRUB SERPL-MCNC: 0.2 MG/DL (ref 0.2–1.2)
BUN SERPL-MCNC: 22 MG/DL (ref 6–20)
BUN/CREAT SERPL: 20.8 (ref 7.3–30)
CALCIUM SPEC-SCNC: 9.9 MG/DL (ref 8.5–10.2)
CHLORIDE SERPL-SCNC: 100 MMOL/L (ref 98–107)
CO2 SERPL-SCNC: 28.6 MMOL/L (ref 22–29)
CREAT SERPL-MCNC: 1.06 MG/DL (ref 0.6–1.1)
DEPRECATED RDW RBC AUTO: 56.5 FL (ref 37–54)
EGFRCR SERPLBLD CKD-EPI 2021: 59.5 ML/MIN/1.73
EOSINOPHIL # BLD AUTO: 0.1 10*3/MM3 (ref 0–0.4)
EOSINOPHIL NFR BLD AUTO: 2.1 % (ref 0.3–6.2)
ERYTHROCYTE [DISTWIDTH] IN BLOOD BY AUTOMATED COUNT: 14.9 % (ref 12.3–15.4)
GLOBULIN UR ELPH-MCNC: 2.5 GM/DL (ref 1.8–3.5)
GLUCOSE SERPL-MCNC: 104 MG/DL (ref 74–124)
HCT VFR BLD AUTO: 38.9 % (ref 34–46.6)
HGB BLD-MCNC: 12.6 G/DL (ref 12–15.9)
IMM GRANULOCYTES # BLD AUTO: 0.01 10*3/MM3 (ref 0–0.05)
IMM GRANULOCYTES NFR BLD AUTO: 0.2 % (ref 0–0.5)
LYMPHOCYTES # BLD AUTO: 1.23 10*3/MM3 (ref 0.7–3.1)
LYMPHOCYTES NFR BLD AUTO: 25.5 % (ref 19.6–45.3)
MAGNESIUM SERPL-MCNC: 2.1 MG/DL (ref 1.8–2.5)
MCH RBC QN AUTO: 33.6 PG (ref 26.6–33)
MCHC RBC AUTO-ENTMCNC: 32.4 G/DL (ref 31.5–35.7)
MCV RBC AUTO: 103.7 FL (ref 79–97)
MONOCYTES # BLD AUTO: 0.46 10*3/MM3 (ref 0.1–0.9)
MONOCYTES NFR BLD AUTO: 9.5 % (ref 5–12)
NEUTROPHILS NFR BLD AUTO: 2.99 10*3/MM3 (ref 1.7–7)
NEUTROPHILS NFR BLD AUTO: 61.9 % (ref 42.7–76)
NRBC BLD AUTO-RTO: 0 /100 WBC (ref 0–0.2)
PHOSPHATE SERPL-MCNC: 4.5 MG/DL (ref 2.5–4.5)
PLATELET # BLD AUTO: 258 10*3/MM3 (ref 140–450)
PMV BLD AUTO: 9.9 FL (ref 6–12)
POTASSIUM SERPL-SCNC: 4.9 MMOL/L (ref 3.5–4.7)
PROT SERPL-MCNC: 6.9 G/DL (ref 6.3–8)
RBC # BLD AUTO: 3.75 10*6/MM3 (ref 3.77–5.28)
SODIUM SERPL-SCNC: 139 MMOL/L (ref 134–145)
WBC NRBC COR # BLD: 4.83 10*3/MM3 (ref 3.4–10.8)

## 2023-05-08 PROCEDURE — 83735 ASSAY OF MAGNESIUM: CPT

## 2023-05-08 PROCEDURE — 85025 COMPLETE CBC W/AUTO DIFF WBC: CPT

## 2023-05-08 PROCEDURE — 25010000002 DENOSUMAB 120 MG/1.7ML SOLUTION: Performed by: NURSE PRACTITIONER

## 2023-05-08 PROCEDURE — 80053 COMPREHEN METABOLIC PANEL: CPT

## 2023-05-08 PROCEDURE — 84100 ASSAY OF PHOSPHORUS: CPT

## 2023-05-08 PROCEDURE — 96372 THER/PROPH/DIAG INJ SC/IM: CPT

## 2023-05-08 PROCEDURE — 36415 COLL VENOUS BLD VENIPUNCTURE: CPT

## 2023-05-08 RX ORDER — SULFAMETHOXAZOLE AND TRIMETHOPRIM 800; 160 MG/1; MG/1
1 TABLET ORAL
COMMUNITY
Start: 2023-05-05 | End: 2023-05-10

## 2023-05-08 RX ADMIN — DENOSUMAB 120 MG: 120 INJECTION SUBCUTANEOUS at 15:15

## 2023-05-08 NOTE — PROGRESS NOTES
"Chief Complaint  Previous Stage Ib ([id number]) ER/IA positive, HER-2/peter negative right breast cancer with subsequent metastatic disease identified 10/8/2017, history of right pulmonary embolism, cancer related pain, chemotherapy-induced diarrhea, chemotherapy-induced mucositis, chemotherapy-induced hand-foot syndrome    Subjective        History of Present Illness  The patient returns today in follow-up continuing on oral Xeloda 1000 mg in the morning, 1500 mg in the evening for 7 days on followed by 7 days off as well as monthly Xgeva and Eliquis 5 mg twice daily.  Last seen in the office, the patient had what was considered a viral gastroenteritis was given IV fluids and antiemetics with improvement.  She denies any recent nausea or vomiting.  She reports occasional constipation though is not using any medications to help with this.  She has tried to increase her fluid intake.  She denies any new pain.  She continues to ambulate with the use of a cane.  She is looking forward to her vacation with her , leaving this Wednesday.  She states she is quite tired from caring for her mother in addition to herself and her .  She does continue to use lotion to her hands and feet daily.    Objective   Vital Signs:   /83   Pulse 90   Temp 98.2 °F (36.8 °C) (Infrared)   Resp 16   Ht 157.5 cm (62.01\")   Wt 81 kg (178 lb 8 oz)   SpO2 95%   BMI 32.64 kg/m²     Physical Exam  Constitutional:       Appearance: She is well-developed.      Comments: Patient ambulates with the use of a cane   Eyes:      Conjunctiva/sclera: Conjunctivae normal.   Neck:      Thyroid: No thyromegaly.   Cardiovascular:      Rate and Rhythm: Normal rate and regular rhythm.      Heart sounds: No murmur heard.    No friction rub. No gallop.   Pulmonary:      Effort: No respiratory distress.      Breath sounds: Normal breath sounds.   Abdominal:      General: Bowel sounds are normal. There is no distension.      Palpations: " Abdomen is soft. There is no mass.      Tenderness: There is no abdominal tenderness.      Hernia: No hernia is present.   Musculoskeletal:      Comments: Braces in place in the bilateral lower extremities   Skin:     General: Skin is warm and dry.      Findings: Rash present.      Comments: Faint erythema involving the palms.  Increased erythema noted on the distal fingers bilaterally with some skin thickening noted in this area.   Neurological:      Mental Status: She is alert and oriented to person, place, and time.   Psychiatric:         Behavior: Behavior normal.     I have reexamined the patient and the results are consistent with the previously documented exam. LISA Mckenzie       Result Review :     Results from last 7 days   Lab Units 05/08/23  1357   WBC 10*3/mm3 4.83   NEUTROS ABS 10*3/mm3 2.99   HEMOGLOBIN g/dL 12.6   HEMATOCRIT % 38.9   PLATELETS 10*3/mm3 258     Results from last 7 days   Lab Units 05/08/23  1357   SODIUM mmol/L 139   POTASSIUM mmol/L 4.9*   CHLORIDE mmol/L 100   CO2 mmol/L 28.6   BUN mg/dL 22*   CREATININE mg/dL 1.06   CALCIUM mg/dL 9.9   ALBUMIN g/dL 4.4   BILIRUBIN mg/dL 0.2   ALK PHOS U/L 54   ALT (SGPT) U/L 16   AST (SGOT) U/L 25   GLUCOSE mg/dL 104   MAGNESIUM mg/dL 2.1                Assessment and Plan     1. Previous Stage Ib ([id number]) right breast cancer:  · Diagnosed May 2010 with excisional biopsy for invasive ductal carcinoma, 1.3 cm, grade 2, ER 90%, W[place] 80%, HER-2/peter negative (1+ IHC).    · Subsequent right mastectomy in July 2010 with no residual breast malignancy, 1/5 sentinel lymph node with micrometastasis (0.25 mm).    · Treated in the [facility] system with adjuvant AC ×4 cycles in 2010 (no taxanes administered due to underlying Charcot-Saloni-Tooth with peripheral neuropathy).    · Adjuvant Femara (postmenopausal) initiated October 2010 with plan to treat ×10 years.    · Genetic testing reportedly negative.    · Developed osteopenia treated with Prolia  beginning 2/27/13. Subsequently discontinued due to identification of metastatic disease.  2. Recurrent/metastatic disease identified 10/8/17:  · Disease involving thoracic spine with cord compression at T6, lumbosacral involvement, sternal and right sternoclavicular involvement.    · Femara discontinued in 10/2017.    · Radiation administered (in the [facility] system) to the thoracic spine beginning 10/19/17 treating T3-T9 to a dose of 24 gray in 6 fractions.  · Evaluation with MRI 12/8/17 showing persistent T6 cord compression with persistent neurologic compromise requiring surgical treatment 12/11/17 with T6 laminectomy/corpectomy and T3-T9 fusion.  Pathology with metastatic carcinoma of breast origin, ER negative, WA negative, HER-2/peter negative (1+ IHC).  · Additional staging evaluation 12/8/17 with no evidence of visceral metastatic disease, bone scan showing involvement of thoracic spine, sternum, left humerus, mid frontal bone.  No plane film correlate of left humerus lesion.  MRI lumbar spine with small intradural L3 metastasis.  CA 15-3 12/6/17- 17.  · Palliative radiation therapy to L3 dural metastasis and left humerus initiated 1/15/18 (10 fractions), completed 1/26/18.  · Hypercalcemia of malignancy with calcium in the 10-11 range.  · Initiation of monthly Xgeva 1/23/18.  · Baseline CT scan 1/30/18 with no evidence of visceral involvement.  Cluster of nodular opacities in the right lower lobe suspected to be infectious or related to bronchiolitis. Bone scan 1/30/18 showed postsurgical change in the thoracic spine, stable uptake in the frontal bone, no new areas of disease.  · Initiation of palliative oral single agent Xeloda 2/7/18 2000 mg a.m., 1500 mg p.m. for 14/21 days.   · Following 3 cycles xeloda, bone scan 4/4/18 showed no change from the prior study.  CT scan 4/4/18 showed a small pericardial effusion of unclear significance as well as a subcutaneous nodule in the right lateral chest wall.   Subsequent evaluation with echocardiogram 4/17/18 showed no evidence of pericardial effusion.  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  We discussed the possibility of surgical excision of the right subcutaneous chest wall lesion for more definitive diagnosis.  Reviewed previous CT images dating back to 12/8/17 and the lesion was present even at that time measuring around 1.7 cm although not commented on in the radiology report.  As this appears to be an indolent low-grade process unrelated to her breast cancer, recommendee foregoing surgical excision at this time and monitoring this area on future scans.  The patient agreed.    · Following 6 cycles of Xeloda, CT 6/6/18  showed stable findings, no evidence of progressive disease.  There was a comment regarding subcutaneous abnormality in the anterior abdominal wall and this was related to Lovenox injection sites.  Bone scan 6/6/18 showed no interval change.   · CT scan and bone scan 8/13/18 following 9 cycles of Xeloda showed stable findings with no evidence of significant visceral metastases.  Her bone lesions appear stable on bone scan.  The spindle cell neoplasm in her right chest wall actually decreased in size from 2 cm down to 1.6 cm.    · The patient experienced some symptoms of diarrhea, anorexia, generalized weakness during cycle 9 Xeloda it was unclear whether this was related to a viral gastroenteritis or toxicity from treatment.  Symptoms recurred during cycle 10 and treatment was cut short by 2 days.  Symptoms attributed to Xeloda.  With cycle 11, dose and schedule altered to 1500 mg twice daily for 7 days on followed by 7 days off .  · CT scan 9/9/2020 with no significant changes.  Bone scan 9/9/2020 read as unchanged from prior studies however did note an area of slight activity in the medial left femur.   Contacted radiology and although this was not noted on prior reports appears to have been present.  Subsequent MRI left femur 9/21/2020 with cortical thickening and periosteal edema left iliac us muscle insertion to the medial left femur with no evidence of metastatic disease, favored to represent periosteal change secondary to insertional tendinitis.  · Severe hand-foot symptoms causing sclerodactyly and limitation in finger movement prompted change in dosing in July 2021 with Xeloda decreased to 1000 mg a.m., 1500 mg p.m. for 7 days on followed by 7 days off.  · Patient was experiencing severe hand-foot syndrome related to Xeloda having developed skin peeling, sclerodactyly with limited use of her hands.  On 3/31/2022, Xeloda was held to allow for recovery.  Patient resumed Xeloda on 4/18/2022.  · Patient required hospitalization 5/29 - 6/1/2022 with presumed viral gastroenteritis that was exacerbated by Xeloda.  Xeloda held briefly, resumed on 6/6/2022.  · Patient again with worsening sclerodactyly, Xeloda held for 2 weeks from 10/3 - 10/17/2022, resumed at prior dose with improvement in symptoms.  · Most recent scans from 3/3/2023 with CT chest abdomen pelvis and bone scan showing stable findings.  · Triage visit 3/24/2023 with intractable nausea vomiting.  Typical schedule Xeloda 1000 mg a.m., 1500 mg p.m. 7 days on followed by 7 days off.  She was due to be taking her Xeloda this week though due to nausea vomiting has not been able to tolerate this.  KUB with no obvious obstruction noted.  This is suspected to be viral gastroenteritis.  Supportive care with IV fluids, antiemetics.  Xeloda will remain on hold and she will resume next week at her regular schedule of 7 days on, 7 days off.   · Patient returns 5/8/2023 due for Xgeva today.  She is currently continuing on Xeloda 1000 mg the morning and evening with 7 days on, followed by 7 days off.  She does have some erythema noted in her distal fingertips and  have instructed her to use Aquaphor every night and continue the CeraVe during the day.  Patient is also cautioned regarding her upcoming trip and sun exposure to make sure that she protects her skin as she would be at increased risk of a sunburn.  Patient does report some constipation so she will begin Senokot-s once nightly and monitor.  We will anticipate repeat scans at a 4-month interval.  3. Right pulmonary embolism:  · Diagnosed on CT angiogram 10/21/17 involving small right lower lobe pulmonary artery.  Lower extremity Dopplers negative.  · Bilateral lower extremity Dopplers negative again 12/5/17.  · Received chronic Lovenox 1 mg/kg twice per day, transition to oral Eliquis in February 2019, continuing on Eliquis 5 mg twice daily.  · Patient continues on anticoagulation without any complications.  4. Cancer related pain:  · Previously receiving Duragesic 50 µg patch every 72 hours along with Dilaudid 4 mg as needed for breakthrough pain  · The patient's pain improved over time and she was able to discontinue both Duragesic and Dilaudid in the interval.  · Patient does take occasional Flexeril at bedtime due to back spasm/pain when she has been more active.  · The patient does have some occasional aggravation of her chronic back pain and does use tramadol 50 mg every 8 hours as needed  · Patient reports her pain is controlled currently.  5. Chemotherapy-induced diarrhea with subsequent C. difficile colitis in the setting of previous ulcerative colitis and then identification of enteropathogenic E. coli:  · Patient with reported history of ulcerative colitis, continues on mesalamine.  · The patient developed initial diarrhea related to Xeloda at regular dosing.  · Symptoms improved on reduced dose Xeloda  · Flare of symptoms in October 2018 with apparent finding of C. difficile colitis by GI, treated with course of oral vancomycin with improvement in symptoms.  · Patient notes minimal intermittent  diarrhea/loose stools on Xeloda requiring occasional dosing of Imodium.    · Patient required hospitalization 5/29 - 6/1/2022 with presumed viral gastroenteritis that was exacerbated by Xeloda.  Xeloda held briefly, resumed on 6/6/2022.  · Patient's  developed C. difficile colitis and patient was experiencing increase in diarrhea.  Stool studies performed 8/4/22 negative C. difficile however GI PCR was positive for enteropathogenic E. coli.  Given patient's symptoms and immunocompromise status it was elected to treat her with azithromycin 500 mg daily x3 days beginning 8/5/2022  · Diarrhea resolved  · Patient underwent colonoscopy on 2/28/2023 with findings of diverticulosis  · She is currently without diarrhea.  She did have some abdominal cramping earlier in the week though had small-volume hard stools.  · Patient is reporting constipation currently.  6. Traumatic left tibia/fibular fracture:  · Status post ORIF 12/6/17  · Specimen was sent for pathologic review, negative for evidence of malignancy  7. Hypercalcemia:  · Suspect hypercalcemia of malignancy, calcium in  10-11 range previously.  · Calcium normalized following initiation of monthly Xgeva on 1/23/18.  8. Chemotherapy-induced mucositis:  · Patient had a minimal degree of mucositis with cycle 2.  The patient has magic mouthwash to use as needed.  No subsequent mucositis.  9. Recurrent UTI, bladder wall thickening on CT:  · Patient had an enterococcal UTI on 3/2/18 sensitive to nitrofurantoin and received treatment, unclear how long.  · Recurrent UTI 3/20/18 with urine culture growing Klebsiella, initially treated with nitrofurantoin, transitioned to Levaquin.  · CT 4/4/18 with diffuse bladder wall thickening with increased nodular thickening at the left base.  Referral to urogynecology Dr. May Johnson.  She was placed on a prophylactic dose of trimethoprim 100 mg daily, bladder wall thickening felt to be related to recent recurrent urinary tract  infections.  · Patient with urinary symptoms, treated with course of Macrobid at the end of December 2018, urine culture however was negative 12/31/18.  · Patient was found to have Klebsiella UTI 7/29/2019 which was successfully treated with Macrobid with complete resolution of symptoms.  · CT 8/10/2021 with diffuse bladder wall thickening new from 5/17/2021 (however seen on multiple prior scans).    · UTI on 10/18/2021 with E. coli greater than 100,000 colonies that was pansensitive, treated with Macrobid x7 days  · With ongoing/recurrent UTIs patient was seen by urogynecology and initiated suppressive therapy with trimethoprim 100 mg daily in December 2021.  She has not experienced any further urinary tract infections.  · CT abdomen pelvis 3/28/2022 does show diffuse thickening of urinary bladder as has been seen on prior studies indicative of cystitis.  · Patient notes that she did stop taking trimethoprim on a daily basis.  She did follow-up recently with urogynecology, aware that she is off of suppressive antibiotics.  Fortunately however she has not experienced any recurrent UTIs recently.  · Patient is currently being treated for a UTI.  10.   Mobility:  · The patient underwent an intensive course of rehabilitation at [facility].  She graduated from her outpatient course November 2018.  · Overall the patient has improved dramatically in terms of mobility,   · Patient had been walking for exercise on a regular basis but reports she has not been doing this as much recently due to the cold weather and responsibilities in caring for family members.  11.  Depression:  · The patient is continuing follow-up in the supportive oncology clinic and is currently continuing on Cymbalta 30 mg twice daily as well as gabapentin 300 mg at dinner and 300 mg nightly, temazepam 15 mg nightly as needed, Provigil 100 mg daily.  · Patient does continue to attend support groups at [facility].  · The patient does continue routine  follow-up in supportive oncology clinic.    · Patient does have multiple stressors with her 's malignancy and chemotherapy as well as her autistic son who is living with them at home.  · Patient continues to have difficulty with stress and anxiety, is being followed by supportive oncology, last seen on 2/23/2023.  12. 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  Symptoms did improve and she continued on the same dose.    · Patient was referred to dermatology and has been continuing on triamcinolone 0.1% cream used for 1 week on followed by 1 week off which led to some further improvement.   · Progressive palmar erythema with development of sclerodactyly and effect on dexterity.  Addition of urea-based cream 3 times daily.  · Patient with continued difficulty regarding erythema of her hands that extended onto the dorsal aspect and was causing contractures/sclerodactyly in her fingers affecting her dexterity.  In July 2021, held Xeloda for an additional week and then reduced dose from 1500 mg twice daily down to 1000 mg a.m. and 1500 mg p.m. and continued on a 7-day on followed by 7-day off schedule.  · With reduction in Xeloda dose, slight improvement in erythema involving dorsal aspect of the hands and slight decrease in sclerodactyly  · Patient was experiencing severe hand-foot syndrome related to Xeloda having developed skin peeling, sclerodactyly with limited use of her hands.  On 3/31/2022, Xeloda was held to allow for recovery.  Patient resumed Xeloda on 4/18/2022.  · Patient developed worsening sclerodactyly affecting dexterity that required Xeloda to again be briefly held for 2 weeks from 10/3 - 10/17/2022.  Treatment resumed at prior dose after improvement in symptoms.  · Patient continues to use emollient cream frequently  (currently 5 times daily) and topical triamcinolone 0.1% twice daily intermittently (2 weeks on followed by 2 weeks off as instructed by dermatology).  Symptoms currently slightly improved with decrease in erythema on the palms and dorsal aspect of the hands although there is a slight increase in scaling and dry skin.  Sclerodactyly persists but is somewhat improved and does not interfere significantly with her dexterity currently.  · 5/8/2023 patient encouraged to increase her use of the triamcinolone as well as Aquaphor as she has not been using the steroid more recently and does have some thickening of her skin at her distal fingers.  Otherwise her palms look good today.  Advised her to monitor her skin with her upcoming trip to [place].  13. Evidence of steatosis on scans with mild intermittent elevated liver function studies:  · Liver function studies increased 8/20/19 with ALT 98, AST 70, normal total bilirubin.  · Negative viral hepatitis A, B, and C panel 8/23/18  · Likely related to hepatic steatosis.   · Subsequent improvement in LFTs  · LFTs remain normal today  14. Chemotherapy induced leukopenia:  · WBC today is normal at 4.83  15. GERD, question of celiac disease:  · Patient with significant history of reflux  · Patient currently receiving Protonix 40 mg daily daily and Carafate 1 g 4 times daily as needed  · Patient required hospitalization 5/29 - 6/1/2022 with presumed viral gastroenteritis that was exacerbated by Xeloda.    · EGD performed 5/31/2022 during hospitalization with biopsy that showed focal blunting of villi and atrophy with slight increase in intraepithelial lymphocytes.  Changes were minimal but recommended correlation with serology to exclude celiac disease.  · Celiac serology 8/8/2022 negative  · Patient previously developed worsening reflux symptoms, temporarily increase Protonix to 40 mg twice daily and we did add Carafate 1 g 4 times daily.    · She is taking Protonix 40 mg once  daily, is not taking Carafate.  16. Insomnia:  · Patient with prior paradoxical reaction to Benadryl  · Improved previously on temazepam 15 mg nightly as needed.   · Patient noted subsequently that temazepam was having no effect.   · Symptoms currently stable on gabapentin 300 mg at dinner and 300 mg nightly as well as temazepam 15 mg nightly as needed  17. Health maintenance:  · Patient notes that she has a history of colon polyps as well as ulcerative colitis and was due for a follow-up colonoscopy on 9/12/2019.  We did discuss there is no necessity to pursue colonoscopy in the setting of her metastatic breast cancer.    · The patient did undergo colonoscopy on 2/7/2020 with findings of muscular hypertrophy and diverticulosis.   · Patient did wish to proceed with further colonoscopic evaluation, performed on 12/20/2022 with poor prep.  Repeat 2/28/2023 with diverticulosis.  18. Bilateral shoulder pain:  · Chronic difficulty with right shoulder although she has not complained of this in prior visits to our office.  She reported difficulty with abduction.    · The patient did undergo MRI of her right shoulder on 11/21/2019 at an outside facility showing multiple abnormalities including supraspinatus tendinosis, labral tear but no evidence of metastatic disease.  · Patient developed pain in the left shoulder, was seen by orthopedics and underwent steroid injection with some improvement.  Right shoulder stable.  Patient did undergo physical therapy with some improvement.  She continues to use tramadol 50 mg every 8-12 hours as needed.  · Note that current CT 10/20/2022 made notation of left shoulder probable bursitis.    · Patient with increased discomfort of left shoulder recently.  Tramadol helps however she is requesting something else for pain control and I will prescribe hydrocodone 5/325 every 4 hours as needed (#60).  I encouraged her to return to Dr. Garcia in orthopedics.  He had previously recommended  shoulder replacement however unclear whether additional measures such as physical therapy or additional injection may be effective.  19. Ocular changes in part related to Xeloda:  · Patient experienced a mild degree of blurred vision as well as burning and pruritus.  · She was seen by her ophthalmologist and was placed on xiidra ophthalmic drops which have helped.  · Likely both issues are to some extent related to Xeloda.  · Symptoms did worsen and patient was seen by a new ophthalmologist at [facility].  She is now using an ocular lubricant at night in addition to artificial tears which have helped.  Symptoms currently stable to improved  20. Elevated glucose:  · It is noted the patient's glucose at the last few visits has been in the high 100 range, postprandial.  · She has had a hemoglobin A1c that has been in the high 5-6 range in the past, on 5/1/2019 at 5.7  · Hemoglobin A1c was last checked in 11/12/2021 at 5.8  21. Possible loosening of pedicle screw at T3:  · CT cervical spine 5/17/2021 showed loosening of the left pedicle screw at T3.  Patient referred to orthopedics and was seen by Dr. Nayak who felt that there were no concerning findings on the scan  22. Iron deficiency anemia  · Labs on 5/2/2022 with hemoglobin 10.8.  Additional labs with iron 48, ferritin 21.2, iron saturation 11%, TIBC 4 and 32, folate greater than 20, B12 greater than 2000.    · Attempted treatment with oral iron daily however patient was intolerant  · Patient subsequently received Venofer 300 mg weekly x4 beginning 6/6/2022 and completed on 6/27/2022  · Subsequent iron studies on 7/11/2022 showed improvement with iron 62, ferritin 345, iron saturation 18%, TIBC 336.  Hemoglobin had improved up to 12.7 at that time.  · Repeat iron studies on 10/3/2022 showed iron replete status with hemoglobin 13.7, iron 121, ferritin 208.7, iron saturation 31%, TIBC 392.  · Hemoglobin currently normal at 12.6       Plan:  1. Continue  Xeloda 1000 mg in the morning and 1500 mg in the evening 7 days on, 7 days off  2. Continue Eliquis 5 mg twice daily  3. The patient will continue frequent use of emollient cream currently 5 times daily and continue periodic triamcinolone cream 0.1% twice daily (provided by dermatology) 2 weeks on followed by 2 weeks off as prescribed  4. Continue Protonix 40 mg daily  5. Continue ocular lubricating gel nightly per ophthalmology  6. Continue Provigil 100 mg daily and Cymbalta 30 mg twice daily, gabapentin 300 mg at dinner and 300 mg at night.  Patient continues routine follow-up with supportive oncology   7. Continue temazepam 15 mg nightly.  8. Patient continues on tramadol 50 mg every 8 hours as needed for pain  9. Patient cautioned regarding use of over-the-counter nonsteroidal medications  10. MD visit in 4 weeks with CBC, CMP, magnesium, phosphorus and Xgeva.  We will discuss timeframe for follow-up scans possibly at a 4-month interval from current studies.     Patient continuing on high risk medication requiring intensive monitoring.      .Nani Bedolla, LISA  05/08/2023

## 2023-05-18 DIAGNOSIS — G89.3 CANCER ASSOCIATED PAIN: ICD-10-CM

## 2023-05-18 RX ORDER — ROSUVASTATIN CALCIUM 5 MG/1
5 TABLET, COATED ORAL DAILY
Qty: 90 TABLET | Refills: 3 | Status: SHIPPED | OUTPATIENT
Start: 2023-05-18

## 2023-05-18 RX ORDER — TRAMADOL HYDROCHLORIDE 50 MG/1
50 TABLET ORAL EVERY 8 HOURS PRN
Qty: 90 TABLET | Refills: 0 | Status: SHIPPED | OUTPATIENT
Start: 2023-05-18

## 2023-05-18 NOTE — TELEPHONE ENCOUNTER
Caller: Martha Davey    Relationship: Self    Best call back number: [phone]    Requested Prescriptions:   Requested Prescriptions     Pending Prescriptions Disp Refills   • traMADol (ULTRAM) 50 MG tablet 90 tablet 0     Sig: Take 1 tablet by mouth Every 8 (Eight) Hours As Needed for Moderate Pain.        Pharmacy where request should be sent: [facility] ([facility]) - [place] [id number] [facility] [phone] [facility] [phone]      Last office visit with prescribing clinician: 3/13/2023   Last telemedicine visit with prescribing clinician: 5/8/2023   Next office visit with prescribing clinician: 6/8/2023     Additional details provided by patient: SHE HAS 5 PILLS LEFT.    Does the patient have less than a 3 day supply:  [x] Yes  [] No    Would you like a call back once the refill request has been completed: [] Yes [x] No    If the office needs to give you a call back, can they leave a voicemail: [x] Yes [] No    Kait Amin Rep   05/18/23 11:48 EDT       SHE ALSO WANTS TO MAKE DR. RICHARDS AWARE THAT SHE IS NEEDING MORE BRAS. SHE SAYS THE LADY SHE GETS THEM FROM SHOULD BE SENDING OVER SOME PAPERWORK TO FILL OUT.

## 2023-05-31 ENCOUNTER — SPECIALTY PHARMACY (OUTPATIENT)
Dept: PHARMACY | Facility: HOSPITAL | Age: 63
End: 2023-05-31

## 2023-05-31 RX ORDER — CAPECITABINE 500 MG/1
TABLET, FILM COATED ORAL
Qty: 70 TABLET | Refills: 5 | Status: SHIPPED | OUTPATIENT
Start: 2023-05-31

## 2023-05-31 RX ORDER — TEMAZEPAM 15 MG/1
15 CAPSULE ORAL NIGHTLY PRN
Qty: 90 CAPSULE | Refills: 0 | Status: SHIPPED | OUTPATIENT
Start: 2023-05-31 | End: 2023-06-01

## 2023-05-31 NOTE — TELEPHONE ENCOUNTER
Caller: Martha Davey    Relationship: Self    Best call back number: [phone]    Requested Prescriptions:   Requested Prescriptions     Pending Prescriptions Disp Refills   • temazepam (RESTORIL) 15 MG capsule 30 capsule 1     Sig: Take 1 capsule by mouth At Night As Needed for Sleep.        Pharmacy where request should be sent: [facility] ([facility]) - [place] [id number] [facility] [phone] [facility] [phone]      Last office visit with prescribing clinician: 3/13/2023   Last telemedicine visit with prescribing clinician: Visit date not found   Next office visit with prescribing clinician: 6/8/2023     Does the patient have less than a 3 day supply:  [] Yes  [x] No    Kait Arguello Rep   05/31/23 11:17 EDT

## 2023-05-31 NOTE — PROGRESS NOTES
Specialty Pharmacy Refill Coordination Note     Martha is a 62 y.o. female contacted today regarding refills of  Xeloda specialty medication(s).    Reviewed and verified with patient:       Specialty medication(s) and dose(s) confirmed: yes    Refill Questions    Flowsheet Row Most Recent Value   Changes to allergies? No   Changes to medications? No   New conditions since last clinic visit No   Unplanned office visit, urgent care, ED, or hospital admission in the last 4 weeks  No   How does patient/caregiver feel medication is working? Good   Financial problems or insurance changes  No   Since the previous refill, were any specialty medication doses or scheduled injections missed or delayed?  Yes   If yes, please provide the amount 1 day   Why were doses missed? forgot   Does this patient require a clinical escalation to a pharmacist? No          Delivery Questions    Flowsheet Row Most Recent Value   Delivery method Other (Comment)  [[facility]eline to pt home- sig required- ship 6/1 deliver 6/2]   Delivery address correct? Yes   Preferred delivery time? Anytime   Number of medications in delivery 1   Medication being filled and delivered Xeloda   Doses left of specialty medications 0.5 weeks- on off week- starts Monday   Is there any medication that is due not being filled? No   Supplies needed? No supplies needed   Cooler needed? No   Do any medications need mixed or dated? No   Copay form of payment Payment plan already set up   Questions or concerns for the pharmacist? No   Are any medications first time fills? No                 Follow-up: 3 week(s)     Ami Hudson  Specialty Pharmacy Technician

## 2023-05-31 NOTE — PROGRESS NOTES
Re: Refills of Oral Specialty Medication - Xeloda (capecitabine)    • Drug-Drug Interactions: The current medication list was reviewed and there are no relevant drug-drug interactions.  • Medication Allergies: The patient has no relevant allergies as it relates to their oral specialty medication  • Review of Labs/Dose Adjustments: NO DOSE CHANGE - I reviewed the most recent note and labs and the patient will continue without any dose changes.  I sent refills as described below.    Drug: Xeloda (capecitabine)  Strength: 500 mg  Directions: Take 2 capsules by mouth in the morning and 3 tablets in the evening for 7 days on, then 7 days off  Quantity: 70  Refills: 5  Pharmacy prescription sent to: [facility]    Name/Credentials: Gage Gonsales, DimitriosD, BCOP    5/31/2023  15:27 EDT    Completed independent double check on medication order/RX.    Thanks,   Lubna Gupta, DimitriosD, BCPS

## 2023-05-31 NOTE — TELEPHONE ENCOUNTER
Refill requested from pharmacy. Per last chart note, patient to continue Xeloda 1000 mg in the morning and 1500 mg in the evening 7 days on, 7 days off. Will route to MD for cosignature.    Gage Gonsales, PharmD, BCOP

## 2023-06-01 RX ORDER — TEMAZEPAM 15 MG/1
15 CAPSULE ORAL NIGHTLY PRN
Qty: 30 CAPSULE | Refills: 0 | Status: SHIPPED | OUTPATIENT
Start: 2023-06-01

## 2023-06-07 ENCOUNTER — OFFICE VISIT (OUTPATIENT)
Dept: PSYCHIATRY | Facility: HOSPITAL | Age: 63
End: 2023-06-07
Payer: MEDICARE

## 2023-06-07 DIAGNOSIS — G47.33 OSA (OBSTRUCTIVE SLEEP APNEA): ICD-10-CM

## 2023-06-07 DIAGNOSIS — F41.1 GENERALIZED ANXIETY DISORDER: ICD-10-CM

## 2023-06-07 DIAGNOSIS — R53.0 NEOPLASTIC MALIGNANT RELATED FATIGUE: Primary | ICD-10-CM

## 2023-06-07 DIAGNOSIS — C50.919 PRIMARY MALIGNANT NEOPLASM OF BREAST WITH METASTASIS: ICD-10-CM

## 2023-06-07 DIAGNOSIS — F33.1 MAJOR DEPRESSIVE DISORDER, RECURRENT EPISODE, MODERATE: ICD-10-CM

## 2023-06-07 RX ORDER — DULOXETIN HYDROCHLORIDE 30 MG/1
30 CAPSULE, DELAYED RELEASE ORAL 2 TIMES DAILY
Qty: 180 CAPSULE | Refills: 0 | Status: SHIPPED | OUTPATIENT
Start: 2023-06-07

## 2023-06-07 NOTE — Clinical Note
Hi Dr. Chanel, My name is Jadelarissa Brunner, and I am a behavioral oncology nurse practitioner. I met with Martha yesterday and we discussed potential benefit of increasing gabapentin to 300 mg tid, taking 300 mg q dinner and 600 mg q hs to assist with sleep, restlessness. I know you are the prescriber on this. Are you ok with the recommendation? Would you prefer I take over the script? I also encouraged her to follow up with sleep med regarding adherence to CPAP. Please let me know if collaboration could be helpful! Thank you, Jade Brunner, APRN [phone]

## 2023-06-07 NOTE — PROGRESS NOTES
"Chief Complaint  Previous Stage Ib ([id number]) ER/[place] positive, HER-2/peter negative right breast cancer with subsequent metastatic disease identified 10/8/2017, history of right pulmonary embolism, cancer related pain, chemotherapy-induced diarrhea, chemotherapy-induced mucositis, chemotherapy-induced hand-foot syndrome    Subjective        History of Present Illness  The patient returns today in follow-up continuing on oral Xeloda 1000 mg in the morning, 1500 mg in the evening for 7 days on followed by 7 days off as well as monthly Xgeva and Eliquis 5 mg twice daily.  The patient is due for Xgeva today and is currently on an \"on week\" of Xeloda which she will complete on 6/11/2023.  She has done relatively well overall recently.  She did have an E. coli UTI with culture positive on 5/5/2023 and received a course of antibiotics from Dr. Johnson in urogynecology.  Symptoms improved however she notes that she still has some degree of malodorous cloudy urine but no burning or frequency issues.  She is going to be contacting Dr. Johnson to see if repeat culture needs to be performed.  She continues follow-up with supportive oncology, last seen on 6/7/2023.  She is having some difficulty with insomnia that is not relieved with Restoril and she is wondering about increasing her gabapentin dose from 600 up to 900 mg nightly.  She does have significant stress at home caring for her autistic son as well as her elderly mother (who lives independently) in addition to her  who is a patient in our practice with metastatic malignancy as well.  She feels that she is coping with things reasonably well overall.  She has not experienced any recent GI issues, had a presumed viral gastroenteritis in March that resolved completely.  She does note recent pain in her left foot, has developed a callus in the lateral portion of her foot.  She is following up with her podiatrist as well as her brace specialist soon.  She notes a normal " "appetite.  She denies any respiratory symptoms.  She does note increase in pain in her posterior neck that has increased somewhat and is positional when looking down.      Objective   Vital Signs:   /85   Pulse 82   Temp 98 °F (36.7 °C) (Temporal)   Resp 16   Ht 157.5 cm (62.01\")   Wt 81.3 kg (179 lb 4.8 oz)   SpO2 96%   BMI 32.79 kg/m²     Physical Exam  Constitutional:       Appearance: She is well-developed.      Comments: Patient ambulates with the use of a cane   Eyes:      Conjunctiva/sclera: Conjunctivae normal.   Neck:      Thyroid: No thyromegaly.   Cardiovascular:      Rate and Rhythm: Normal rate and regular rhythm.      Heart sounds: No murmur heard.    No friction rub. No gallop.   Pulmonary:      Effort: No respiratory distress.      Breath sounds: Normal breath sounds.   Abdominal:      General: Bowel sounds are normal. There is no distension.      Palpations: Abdomen is soft.      Tenderness: There is no abdominal tenderness.   Musculoskeletal:      Comments: Braces in place in the bilateral lower extremities   Lymphadenopathy:      Head:      Right side of head: No submandibular adenopathy.      Cervical: No cervical adenopathy.      Upper Body:      Right upper body: No supraclavicular adenopathy.      Left upper body: No supraclavicular adenopathy.   Skin:     General: Skin is warm and dry.      Findings: Rash present.      Comments: Erythema involving the palms overall improved with decrease in the degree of erythema on the palms of the hands and extension into the dorsal aspect of the hands.  There is significant dryness noted with skin that is cracked and flaking.  Sclerodactyly is currently stable.  There is erythema in the plantar surface.  On the left foot laterally there is an area of scaling with underlying callus formation.   Neurological:      Mental Status: She is alert and oriented to person, place, and time.      Cranial Nerves: No cranial nerve deficit.      Motor: No " abnormal muscle tone.      Deep Tendon Reflexes: Reflexes normal.   Psychiatric:         Behavior: Behavior normal.       Result Review : Reviewed CBC, CMP, magnesium, phosphorus from today.     Assessment and Plan     Previous Stage Ib ([id number]) right breast cancer:  Diagnosed May 2010 with excisional biopsy for invasive ductal carcinoma, 1.3 cm, grade 2, ER 90%, MI 80%, HER-2/peter negative (1+ IHC).    Subsequent right mastectomy in July 2010 with no residual breast malignancy, 1/5 sentinel lymph node with micrometastasis (0.25 mm).    Treated in the [facility] system with adjuvant AC ×4 cycles in 2010 (no taxanes administered due to underlying Charcot-Saloni-Tooth with peripheral neuropathy).    Adjuvant Femara (postmenopausal) initiated October 2010 with plan to treat ×10 years.    Genetic testing reportedly negative.    Developed osteopenia treated with Prolia beginning 2/27/13. Subsequently discontinued due to identification of metastatic disease.  Recurrent/metastatic disease identified 10/8/17:  Disease involving thoracic spine with cord compression at T6, lumbosacral involvement, sternal and right sternoclavicular involvement.    Femara discontinued in 10/2017.    Radiation administered (in the [facility] system) to the thoracic spine beginning 10/19/17 treating T3-T9 to a dose of 24 gray in 6 fractions.  Evaluation with MRI 12/8/17 showing persistent T6 cord compression with persistent neurologic compromise requiring surgical treatment 12/11/17 with T6 laminectomy/corpectomy and T3-T9 fusion.  Pathology with metastatic carcinoma of breast origin, ER negative, MI negative, HER-2/peter negative (1+ IHC).  Additional staging evaluation 12/8/17 with no evidence of visceral metastatic disease, bone scan showing involvement of thoracic spine, sternum, left humerus, mid frontal bone.  No plane film correlate of left humerus lesion.  MRI lumbar spine with small intradural L3 metastasis.  CA 15-3 12/6/17- 17.  Palliative  radiation therapy to L3 dural metastasis and left humerus initiated 1/15/18 (10 fractions), completed 1/26/18.  Hypercalcemia of malignancy with calcium in the 10-11 range.  Initiation of monthly Xgeva 1/23/18.  Baseline CT scan 1/30/18 with no evidence of visceral involvement.  Cluster of nodular opacities in the right lower lobe suspected to be infectious or related to bronchiolitis. Bone scan 1/30/18 showed postsurgical change in the thoracic spine, stable uptake in the frontal bone, no new areas of disease.  Initiation of palliative oral single agent Xeloda 2/7/18 2000 mg a.m., 1500 mg p.m. for 14/21 days.   Following 3 cycles xeloda, bone scan 4/4/18 showed no change from the prior study.  CT scan 4/4/18 showed a small pericardial effusion of unclear significance as well as a subcutaneous nodule in the right lateral chest wall.  Subsequent evaluation with echocardiogram 4/17/18 showed no evidence of pericardial effusion.  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  We discussed the possibility of surgical excision of the right subcutaneous chest wall lesion for more definitive diagnosis.  Reviewed previous CT images dating back to 12/8/17 and the lesion was present even at that time measuring around 1.7 cm although not commented on in the radiology report.  As this appears to be an indolent low-grade process unrelated to her breast cancer, recommendee foregoing surgical excision at this time and monitoring this area on future scans.  The patient agreed.    Following 6 cycles of Xeloda, CT 6/6/18  showed stable findings, no evidence of progressive disease.  There was a comment regarding subcutaneous abnormality in the anterior abdominal wall and this was related to Lovenox injection sites.  Bone scan 6/6/18 showed no interval change.   CT scan and bone scan 8/13/18  following 9 cycles of Xeloda showed stable findings with no evidence of significant visceral metastases.  Her bone lesions appear stable on bone scan.  The spindle cell neoplasm in her right chest wall actually decreased in size from 2 cm down to 1.6 cm.    The patient experienced some symptoms of diarrhea, anorexia, generalized weakness during cycle 9 Xeloda it was unclear whether this was related to a viral gastroenteritis or toxicity from treatment.  Symptoms recurred during cycle 10 and treatment was cut short by 2 days.  Symptoms attributed to Xeloda.  With cycle 11, dose and schedule altered to 1500 mg twice daily for 7 days on followed by 7 days off .  CT scan 9/9/2020 with no significant changes.  Bone scan 9/9/2020 read as unchanged from prior studies however did note an area of slight activity in the medial left femur.  Contacted radiology and although this was not noted on prior reports appears to have been present.  Subsequent MRI left femur 9/21/2020 with cortical thickening and periosteal edema left iliac us muscle insertion to the medial left femur with no evidence of metastatic disease, favored to represent periosteal change secondary to insertional tendinitis.  Severe hand-foot symptoms causing sclerodactyly and limitation in finger movement prompted change in dosing in July 2021 with Xeloda decreased to 1000 mg a.m., 1500 mg p.m. for 7 days on followed by 7 days off.  Patient was experiencing severe hand-foot syndrome related to Xeloda having developed skin peeling, sclerodactyly with limited use of her hands.  On 3/31/2022, Xeloda was held to allow for recovery.  Patient resumed Xeloda on 4/18/2022.  Patient required hospitalization 5/29 - 6/1/2022 with presumed viral gastroenteritis that was exacerbated by Xeloda.  Xeloda held briefly, resumed on 6/6/2022.  Patient again with worsening sclerodactyly, Xeloda held for 2 weeks from 10/3 - 10/17/2022, resumed at prior dose with improvement in  "symptoms.  Most recent scans from 3/3/2023 with CT chest abdomen pelvis and bone scan showing stable findings.  The patient returns today continuing on treatment with Xeloda 1000 mg a.m., 1500 mg p.m. 7 days on followed by 7 days off.  She also continues on monthly Xgeva, overdue for treatment today.  Patient is currently continuing on an  \"on week\" of Xeloda which she will complete on 6/11/2023.  She is overall tolerating treatment well.  Hand-foot symptoms are overall stable to improved in terms of erythema, scaling and peeling as well as sclerodactyly.  On her right plantar surface there is an area of scaling in the mid lateral portion of the foot with associated underlying callus and I did suggest that she evaluate her braces and shoes with her podiatrist and brace specialist.  She continues to use emollient cream frequently as well as preparations from her dermatologist entheses see below).  Patient is dealing with quite a bit of stress caring for her elderly mother, son with autism, and her  who has metastatic cancer as well.  She continues routine follow-up in the supportive oncology clinic, last seen on 6/7/2023.  She does note a slight increase in neck pain which is positional in nature when she bends her neck downwards.  This has increased somewhat recently and we will obtain MRI cervical spine with her upcoming scans in 3 weeks.  I will see her back in 4 weeks when she is again due for Xgeva and we will review CT chest abdomen pelvis, bone scan, and MRI cervical spine.  Right pulmonary embolism:  Diagnosed on CT angiogram 10/21/17 involving small right lower lobe pulmonary artery.  Lower extremity Dopplers negative.  Bilateral lower extremity Dopplers negative again 12/5/17.  Received chronic Lovenox 1 mg/kg twice per day, transition to oral Eliquis in February 2019, continuing on Eliquis 5 mg twice daily.  Patient continues on anticoagulation without any complications.  Cancer related " pain:  Previously receiving Duragesic 50 µg patch every 72 hours along with Dilaudid 4 mg as needed for breakthrough pain  The patient's pain improved over time and she was able to discontinue both Duragesic and Dilaudid in the interval.  Patient does take occasional Flexeril at bedtime due to back spasm/pain when she has been more active.  The patient does have some occasional aggravation of her chronic back pain and does use tramadol 50 mg every 8 hours as needed  Patient does use tramadol 50 mg every 8-12 hours as needed in addition to hydrocodone 5/325 every 4 hours as needed.  Chemotherapy-induced diarrhea with subsequent C. difficile colitis in the setting of previous ulcerative colitis and then identification of enteropathogenic E. coli:  Patient with reported history of ulcerative colitis, continues on mesalamine.  The patient developed initial diarrhea related to Xeloda at regular dosing.  Symptoms improved on reduced dose Xeloda  Flare of symptoms in October 2018 with apparent finding of C. difficile colitis by GI, treated with course of oral vancomycin with improvement in symptoms.  Patient notes minimal intermittent diarrhea/loose stools on Xeloda requiring occasional dosing of Imodium.    Patient required hospitalization 5/29 - 6/1/2022 with presumed viral gastroenteritis that was exacerbated by Xeloda.  Xeloda held briefly, resumed on 6/6/2022.  Patient's  developed C. difficile colitis and patient was experiencing increase in diarrhea.  Stool studies performed 8/4/22 negative C. difficile however GI PCR was positive for enteropathogenic E. coli.  Given patient's symptoms and immunocompromise status it was elected to treat her with azithromycin 500 mg daily x3 days beginning 8/5/2022  Diarrhea resolved  Patient underwent colonoscopy on 2/28/2023 with findings of diverticulosis  Traumatic left tibia/fibular fracture:  Status post ORIF 12/6/17  Specimen was sent for pathologic review, negative for  evidence of malignancy  Hypercalcemia:  Suspect hypercalcemia of malignancy, calcium in  10-11 range previously.  Calcium normalized following initiation of monthly Xgeva on 1/23/18.  Chemotherapy-induced mucositis:  Patient had a minimal degree of mucositis with cycle 2.  The patient has magic mouthwash to use as needed.  No subsequent mucositis.  Recurrent UTI, bladder wall thickening on CT:  Patient had an enterococcal UTI on 3/2/18 sensitive to nitrofurantoin and received treatment, unclear how long.  Recurrent UTI 3/20/18 with urine culture growing Klebsiella, initially treated with nitrofurantoin, transitioned to Levaquin.  CT 4/4/18 with diffuse bladder wall thickening with increased nodular thickening at the left base.  Referral to urogynecology Dr. May Johnson.  She was placed on a prophylactic dose of trimethoprim 100 mg daily, bladder wall thickening felt to be related to recent recurrent urinary tract infections.  Patient with urinary symptoms, treated with course of Macrobid at the end of December 2018, urine culture however was negative 12/31/18.  Patient was found to have Klebsiella UTI 7/29/2019 which was successfully treated with Macrobid with complete resolution of symptoms.  CT 8/10/2021 with diffuse bladder wall thickening new from 5/17/2021 (however seen on multiple prior scans).    UTI on 10/18/2021 with E. coli greater than 100,000 colonies that was pansensitive, treated with Macrobid x7 days  With ongoing/recurrent UTIs patient was seen by urogynecology and initiated suppressive therapy with trimethoprim 100 mg daily in December 2021.  She has not experienced any further urinary tract infections.  CT abdomen pelvis 3/28/2022 does show diffuse thickening of urinary bladder as has been seen on prior studies indicative of cystitis.  Patient notes that she did stop taking trimethoprim on a daily basis.    Patient reports recent urinary tract infection, urine culture on 5/5/2023 grew E. coli and  she received antibiotic treatment from urogynecology.  She notes that she still has some degree of malodorous and cloudy urine and will contact urogynecology to see if they want to repeat her urine studies.    Mobility:  The patient underwent an intensive course of rehabilitation at [facility].  She graduated from her outpatient course November 2018.  Overall the patient has improved dramatically in terms of mobility,   Patient tries to walk for exercise on a regular basis but finds this difficult with her caregiving responsibilities.  As noted above, she does have significant callus formation lateral aspect of the left plantar surface and I have asked her to discuss this with her podiatrist and brace specialist.   Depression:  The patient is continuing follow-up in the supportive oncology clinic and is currently continuing on Cymbalta 30 mg twice daily as well as gabapentin 600 mg nightly, temazepam 15 mg nightly as needed, Provigil 100 mg daily.  Patient does continue to attend support groups at [facility].  The patient does continue routine follow-up in supportive oncology clinic.    Patient does have multiple stressors with her 's malignancy and chemotherapy as well as her autistic son who is living with them at home.  Patient continues to have difficulty with stress and anxiety, is being followed by supportive oncology, last seen on 6/7/2023.  Hand-foot syndrome secondary to Xeloda:  Patient continues with frequent application of emollient cream to the hands and feet  Symptoms increased significantly requiring a 1 week delay in cycle 18 Xeloda as noted above.  Symptoms did improve and she continued on the same dose.    Patient was referred to dermatology and has been continuing on triamcinolone 0.1% cream used for 1 week on followed by 1 week off which led to some further improvement.   Progressive palmar erythema with development of sclerodactyly and effect on dexterity.  Addition of urea-based cream 3  times daily.  Patient with continued difficulty regarding erythema of her hands that extended onto the dorsal aspect and was causing contractures/sclerodactyly in her fingers affecting her dexterity.  In July 2021, held Xeloda for an additional week and then reduced dose from 1500 mg twice daily down to 1000 mg a.m. and 1500 mg p.m. and continued on a 7-day on followed by 7-day off schedule.  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  On 3/31/2022, Xeloda was held to allow for recovery.  Patient resumed Xeloda on 4/18/2022.  Patient developed worsening sclerodactyly affecting dexterity that required Xeloda to again be briefly held for 2 weeks from 10/3 - 10/17/2022.  Treatment resumed at prior dose after improvement in symptoms.  Patient continues to use emollient cream frequently (currently 5 times daily) and topical triamcinolone 0.1% twice daily intermittently (2 weeks on followed by 2 weeks off as instructed by dermatology).  Symptoms currently stable to slightly improved.  The degree of erythema has diminished.  She continues with skin scaling and some peeling of the hands.  There is slight extension of the erythema onto the dorsal aspect of the fingers and hands as before.  Plantar erythema is mild.  Sclerodactyly persists but is currently manageable.  Evidence of steatosis on scans with mild intermittent elevated liver function studies:  Liver function studies increased 8/20/19 with ALT 98, AST 70, normal total bilirubin.  Negative viral hepatitis A, B, and C panel 8/23/18  Likely related to hepatic steatosis.   Subsequent improvement in LFTs  LFTs today are normal  Chemotherapy induced leukopenia:  WBC today is borderline low at 3.9, ANC 2.34  GERD, question of celiac  disease:  Patient with significant history of reflux  Patient currently receiving Protonix 40 mg daily daily and Carafate 1 g 4 times daily as needed  Patient required hospitalization 5/29 - 6/1/2022 with presumed viral gastroenteritis that was exacerbated by Xeloda.    EGD performed 5/31/2022 during hospitalization with biopsy that showed focal blunting of villi and atrophy with slight increase in intraepithelial lymphocytes.  Changes were minimal but recommended correlation with serology to exclude celiac disease.  Celiac serology 8/8/2022 negative  Patient previously developed worsening reflux symptoms, temporarily increase Protonix to 40 mg twice daily and we did add Carafate 1 g 4 times daily.    She is taking Protonix 40 mg once daily, is not taking Carafate.  Insomnia:  Patient with prior paradoxical reaction to Benadryl  Improved previously on temazepam 15 mg nightly as needed.   Patient noted subsequently that temazepam was having no effect.   Symptoms currently stable on gabapentin 600 mg nightly as well as temazepam 15 mg nightly as needed.  Patient does not feel that temazepam is helping and would like to stop the temazepam and try increasing her gabapentin dose up to 900 mg at night.  If this is effective she will notify us and we will change her prescription.  Health maintenance:  Patient notes that she has a history of colon polyps as well as ulcerative colitis and was due for a follow-up colonoscopy on 9/12/2019.  We did discuss there is no necessity to pursue colonoscopy in the setting of her metastatic breast cancer.    The patient did undergo colonoscopy on 2/7/2020 with findings of muscular hypertrophy and diverticulosis.   Patient did wish to proceed with further colonoscopic evaluation, performed on 12/20/2022 with poor prep.  Repeat 2/28/2023 with diverticulosis.  Bilateral shoulder pain:  Chronic difficulty with right shoulder although she has not complained of this in prior visits to our  office.  She reported difficulty with abduction.    The patient did undergo MRI of her right shoulder on 11/21/2019 at an outside facility showing multiple abnormalities including supraspinatus tendinosis, labral tear but no evidence of metastatic disease.  Patient developed pain in the left shoulder, was seen by orthopedics and underwent steroid injection with some improvement.  Right shoulder stable.  Patient did undergo physical therapy with some improvement.  She continues to use tramadol 50 mg every 8-12 hours as needed.  Note that current CT 10/20/2022 made notation of left shoulder probable bursitis.    Patient was experiencing increased pain in the left shoulder however this has improved to some degree.  Ocular changes in part related to Xeloda:  Patient experienced a mild degree of blurred vision as well as burning and pruritus.  She was seen by her ophthalmologist and was placed on xiidra ophthalmic drops which have helped.  Likely both issues are to some extent related to Xeloda.  Symptoms did worsen and patient was seen by a new ophthalmologist at [facility].  She is now using an ocular lubricant at night in addition to artificial tears which have helped.  Symptoms currently stable to improved  Elevated glucose:  It is noted the patient's glucose at the last few visits has been in the high 100 range, postprandial.  She has had a hemoglobin A1c that has been in the high 5-6 range in the past, on 5/1/2019 at 5.7  Hemoglobin A1c was last checked in 11/12/2021 at 5.8  Possible loosening of pedicle screw at T3:  CT cervical spine 5/17/2021 showed loosening of the left pedicle screw at T3.  Patient referred to orthopedics and was seen by Dr. Nayak who felt that there were no concerning findings on the scan  Iron deficiency anemia  Labs on 5/2/2022 with hemoglobin 10.8.  Additional labs with iron 48, ferritin 21.2, iron saturation 11%, TIBC 4 and 32, folate greater than 20, B12 greater than 2000.     Attempted treatment with oral iron daily however patient was intolerant  Patient subsequently received Venofer 300 mg weekly x4 beginning 6/6/2022 and completed on 6/27/2022  Subsequent iron studies on 7/11/2022 showed improvement with iron 62, ferritin 345, iron saturation 18%, TIBC 336.  Hemoglobin had improved up to 12.7 at that time.  Repeat iron studies on 10/3/2022 showed iron replete status with hemoglobin 13.7, iron 121, ferritin 208.7, iron saturation 31%, TIBC 392.  Hemoglobin currently normal at 12.9     Plan:  Continue palliative single agent Xeloda 1000 mg a.m., 1500 mg in the p.m. 7 days on followed by 7 days off (patient will complete current week on Xeloda on 6/11/2023)  The patient continues on monthly Xgeva which we are administering today  Continue Eliquis 5 mg twice daily  The patient will continue frequent use of emollient cream currently 5 times daily and continue periodic triamcinolone cream 0.1% twice daily (provided by dermatology) 2 weeks on followed by 2 weeks off as prescribed  Continue Protonix 40 mg daily  Continue ocular lubricating gel nightly per ophthalmology  Continue Provigil 100 mg daily and Cymbalta 30 mg twice daily.  Patient continues routine follow-up with supportive oncology   Patient will discontinue temazepam (ineffective) and increase gabapentin dose from 600 up to 900 mg at night.  She will notify us if this is effective and we will need to send a new prescription  Patient continues on tramadol 50 mg every 8 hours as needed and hydrocodone 5/325 every 4 hours as needed for pain  The patient will follow-up with her podiatrist and brace specialist regarding callus formation and scaling in the lateral left plantar surface  In 3 weeks CT chest abdomen pelvis, bone scan, MRI cervical spine  In 4 weeks MD visit with CBC, CMP, magnesium, phosphorus and Xgeva.  We will review scan results.     Patient continuing on high risk medication requiring intensive monitoring.       I  did spend 50 minutes in total time caring for this patient today, time spent in review of records, face-to-face consultation, coordination of care, placement of orders, completion of documentation.

## 2023-06-07 NOTE — PROGRESS NOTES
[facility]  In Person Session    Subjective  Patient ID: Martha Davey is a 62 y.o. female is seen face to face in the [facility].    CC: Anxiety, depression, fatigue, insomnia    HPI/ Interval History:   Pt is seen in follow up alongside . Continues in survivorship of metastatic breast cancer. Mood remains depressed. Pt continues to endorse burden of caregiver distress. Greatly enjoyed vacation with , appreciation of space from other caregiver responsibilities. Unfortunately feels return home has limited ability to maintain these boundaries. Has allowed self to take time to garden, which she enjoys. Is walking when possible, currently disrupted with recent foot injury. Remains frustrated with family dynamics.      Sleep schedule remains disrupted. Considers enjoyment of time to self, although noting distancing from previously beneficial schedule of 12 AM to 9 AM. Daily routine reviewed to include scheduled meals, time outside, me time in evening. Considered sleep disruption which can lead to later awakening in the day. Acknowledges she is not wearing CPAP at night, and  confirms concern regarding snoring, fitful sleep. Pt continues to take temazepam q hs. Also takes gabapentin 600 mg q hs, although interested in higher dose due to restless legs.    Exam: As above    Recent Labs Reviewed:  Lab on 05/08/2023   Component Date Value    Glucose 05/08/2023 104     BUN 05/08/2023 22 (H)     Creatinine 05/08/2023 1.06     Sodium 05/08/2023 139     Potassium 05/08/2023 4.9 (H)     Chloride 05/08/2023 100     CO2 05/08/2023 28.6     Calcium 05/08/2023 9.9     Total Protein 05/08/2023 6.9     Albumin 05/08/2023 4.4     ALT (SGPT) 05/08/2023 16     AST (SGOT) 05/08/2023 25     Alkaline Phosphatase 05/08/2023 54     Total Bilirubin 05/08/2023 0.2     Globulin 05/08/2023 2.5     A/G Ratio 05/08/2023 1.8     BUN/Creatinine Ratio 05/08/2023 20.8     Anion Gap  05/08/2023 10.4     eGFR 05/08/2023 59.5 (L)     Magnesium 05/08/2023 2.1     Phosphorus 05/08/2023 4.5     WBC 05/08/2023 4.83     RBC 05/08/2023 3.75 (L)     Hemoglobin 05/08/2023 12.6     Hematocrit 05/08/2023 38.9     MCV 05/08/2023 103.7 (H)     MCH 05/08/2023 33.6 (H)     MCHC 05/08/2023 32.4     RDW 05/08/2023 14.9     RDW-SD 05/08/2023 56.5 (H)     MPV 05/08/2023 9.9     Platelets 05/08/2023 258     Neutrophil % 05/08/2023 61.9     Lymphocyte % 05/08/2023 25.5     Monocyte % 05/08/2023 9.5     Eosinophil % 05/08/2023 2.1     Basophil % 05/08/2023 0.8     Immature Grans % 05/08/2023 0.2     Neutrophils, Absolute 05/08/2023 2.99     Lymphocytes, Absolute 05/08/2023 1.23     Monocytes, Absolute 05/08/2023 0.46     Eosinophils, Absolute 05/08/2023 0.10     Basophils, Absolute 05/08/2023 0.04     Immature Grans, Absolute 05/08/2023 0.01     nRBC 05/08/2023 0.0    Lab on 04/10/2023   Component Date Value    Glucose 04/10/2023 138 (H)     BUN 04/10/2023 25 (H)     Creatinine 04/10/2023 0.90     Sodium 04/10/2023 142     Potassium 04/10/2023 4.4     Chloride 04/10/2023 105     CO2 04/10/2023 28.2     Calcium 04/10/2023 9.9     Total Protein 04/10/2023 6.9     Albumin 04/10/2023 4.2     ALT (SGPT) 04/10/2023 13     AST (SGOT) 04/10/2023 22     Alkaline Phosphatase 04/10/2023 56     Total Bilirubin 04/10/2023 0.3     Globulin 04/10/2023 2.7     A/G Ratio 04/10/2023 1.6     BUN/Creatinine Ratio 04/10/2023 27.8 (H)     Anion Gap 04/10/2023 8.8     eGFR 04/10/2023 72.4     Magnesium 04/10/2023 1.9     Phosphorus 04/10/2023 3.7     WBC 04/10/2023 12.68 (H)     RBC 04/10/2023 3.92     Hemoglobin 04/10/2023 13.4     Hematocrit 04/10/2023 40.2     MCV 04/10/2023 102.6 (H)     MCH 04/10/2023 34.2 (H)     MCHC 04/10/2023 33.3     RDW 04/10/2023 13.9     RDW-SD 04/10/2023 52.1     MPV 04/10/2023 10.5     Platelets 04/10/2023 259     Neutrophil % 04/10/2023 84.6 (H)     Lymphocyte % 04/10/2023 7.9 (L)     Monocyte % 04/10/2023  5.7     Eosinophil % 04/10/2023 1.1     Basophil % 04/10/2023 0.5     Immature Grans % 04/10/2023 0.2     Neutrophils, Absolute 04/10/2023 10.73 (H)     Lymphocytes, Absolute 04/10/2023 1.00     Monocytes, Absolute 04/10/2023 0.72     Eosinophils, Absolute 04/10/2023 0.14     Basophils, Absolute 04/10/2023 0.06     Immature Grans, Absolute 04/10/2023 0.03     nRBC 04/10/2023 0.0    Labs reviewed    Current Psychotropic Medications:  Cymbalta 30 mg bid  Gabapentin 600 mg q hs  Modafinil q AM  Restoril q hs    Plan of Care/ Medical Decision Making  CBT-I reviewed with importance of sleep schedule. Considered importance of CPAP, currently non adherent. Explored impact of sleep schedule on affective disturbance, energy, and general routine. Also explored risks associated with unmanaged sleep apnea, complications of continuing wake promoting agent in absence of appropriate adherence to CPAP. Pt agrees to schedule follow up with sleep medicine to consider strategies for adherence.   Medication strategy reviewed; considered added benefit of increasing gabapentin with goal of reducing temazepam to PRN use. Message sent to Dr. Chanel regarding recommendation to increase gabapentin to 300 mg q dinner, 600 mg q hs alongside adherence to sleep schedule.  Supported couple in effective communication strategies, identification and adherence to boundaries and routine.  Medications refilled.  Follow up scheduled in 4 weeks.    Diagnoses and all orders for this visit:    1. Neoplastic malignant related fatigue (Primary)    2. Major depressive disorder, recurrent episode, moderate    3. Generalized anxiety disorder    4. MARIA M (obstructive sleep apnea)    5. Metastatic breast cancer    Other orders  -     DULoxetine (CYMBALTA) 30 MG capsule; Take 1 capsule by mouth 2 (Two) Times a Day.  Dispense: 180 capsule; Refill: 0

## 2023-06-08 ENCOUNTER — INFUSION (OUTPATIENT)
Dept: ONCOLOGY | Facility: HOSPITAL | Age: 63
End: 2023-06-08
Payer: MEDICARE

## 2023-06-08 ENCOUNTER — OFFICE VISIT (OUTPATIENT)
Dept: ONCOLOGY | Facility: CLINIC | Age: 63
End: 2023-06-08
Payer: MEDICARE

## 2023-06-08 ENCOUNTER — LAB (OUTPATIENT)
Dept: LAB | Facility: HOSPITAL | Age: 63
End: 2023-06-08
Payer: MEDICARE

## 2023-06-08 ENCOUNTER — SPECIALTY PHARMACY (OUTPATIENT)
Dept: ONCOLOGY | Facility: HOSPITAL | Age: 63
End: 2023-06-08
Payer: MEDICARE

## 2023-06-08 VITALS
SYSTOLIC BLOOD PRESSURE: 131 MMHG | WEIGHT: 179.3 LBS | HEIGHT: 62 IN | OXYGEN SATURATION: 96 % | BODY MASS INDEX: 33 KG/M2 | DIASTOLIC BLOOD PRESSURE: 85 MMHG | TEMPERATURE: 98 F | HEART RATE: 82 BPM | RESPIRATION RATE: 16 BRPM

## 2023-06-08 DIAGNOSIS — C50.811 MALIGNANT NEOPLASM OF OVERLAPPING SITES OF RIGHT BREAST IN FEMALE, ESTROGEN RECEPTOR NEGATIVE: Primary | ICD-10-CM

## 2023-06-08 DIAGNOSIS — I10 HYPERTENSION, UNSPECIFIED TYPE: ICD-10-CM

## 2023-06-08 DIAGNOSIS — C50.811 MALIGNANT NEOPLASM OF OVERLAPPING SITES OF RIGHT BREAST IN FEMALE, ESTROGEN RECEPTOR NEGATIVE: ICD-10-CM

## 2023-06-08 DIAGNOSIS — R73.9 HYPERGLYCEMIA: ICD-10-CM

## 2023-06-08 DIAGNOSIS — Z17.1 MALIGNANT NEOPLASM OF OVERLAPPING SITES OF RIGHT BREAST IN FEMALE, ESTROGEN RECEPTOR NEGATIVE: Primary | ICD-10-CM

## 2023-06-08 DIAGNOSIS — D50.9 IRON DEFICIENCY ANEMIA, UNSPECIFIED IRON DEFICIENCY ANEMIA TYPE: ICD-10-CM

## 2023-06-08 DIAGNOSIS — E78.5 HYPERLIPIDEMIA, UNSPECIFIED HYPERLIPIDEMIA TYPE: Primary | ICD-10-CM

## 2023-06-08 DIAGNOSIS — Z17.1 MALIGNANT NEOPLASM OF OVERLAPPING SITES OF RIGHT BREAST IN FEMALE, ESTROGEN RECEPTOR NEGATIVE: ICD-10-CM

## 2023-06-08 LAB
ALBUMIN SERPL-MCNC: 4.2 G/DL (ref 3.5–5.2)
ALBUMIN/GLOB SERPL: 1.6 G/DL (ref 1.1–2.4)
ALP SERPL-CCNC: 56 U/L (ref 38–116)
ALT SERPL W P-5'-P-CCNC: 19 U/L (ref 0–33)
ANION GAP SERPL CALCULATED.3IONS-SCNC: 11.1 MMOL/L (ref 5–15)
AST SERPL-CCNC: 29 U/L (ref 0–32)
BASOPHILS # BLD AUTO: 0.04 10*3/MM3 (ref 0–0.2)
BASOPHILS NFR BLD AUTO: 1 % (ref 0–1.5)
BILIRUB SERPL-MCNC: 0.5 MG/DL (ref 0.2–1.2)
BUN SERPL-MCNC: 21 MG/DL (ref 6–20)
BUN/CREAT SERPL: 23.6 (ref 7.3–30)
CALCIUM SPEC-SCNC: 9.5 MG/DL (ref 8.5–10.2)
CHLORIDE SERPL-SCNC: 104 MMOL/L (ref 98–107)
CO2 SERPL-SCNC: 27.9 MMOL/L (ref 22–29)
CREAT SERPL-MCNC: 0.89 MG/DL (ref 0.6–1.1)
DEPRECATED RDW RBC AUTO: 59.2 FL (ref 37–54)
EGFRCR SERPLBLD CKD-EPI 2021: 73.4 ML/MIN/1.73
EOSINOPHIL # BLD AUTO: 0.17 10*3/MM3 (ref 0–0.4)
EOSINOPHIL NFR BLD AUTO: 4.4 % (ref 0.3–6.2)
ERYTHROCYTE [DISTWIDTH] IN BLOOD BY AUTOMATED COUNT: 15.7 % (ref 12.3–15.4)
GLOBULIN UR ELPH-MCNC: 2.6 GM/DL (ref 1.8–3.5)
GLUCOSE SERPL-MCNC: 98 MG/DL (ref 74–124)
HCT VFR BLD AUTO: 40.5 % (ref 34–46.6)
HGB BLD-MCNC: 12.9 G/DL (ref 12–15.9)
IMM GRANULOCYTES # BLD AUTO: 0.01 10*3/MM3 (ref 0–0.05)
IMM GRANULOCYTES NFR BLD AUTO: 0.3 % (ref 0–0.5)
LYMPHOCYTES # BLD AUTO: 0.93 10*3/MM3 (ref 0.7–3.1)
LYMPHOCYTES NFR BLD AUTO: 23.8 % (ref 19.6–45.3)
MAGNESIUM SERPL-MCNC: 1.9 MG/DL (ref 1.8–2.5)
MCH RBC QN AUTO: 33 PG (ref 26.6–33)
MCHC RBC AUTO-ENTMCNC: 31.9 G/DL (ref 31.5–35.7)
MCV RBC AUTO: 103.6 FL (ref 79–97)
MONOCYTES # BLD AUTO: 0.41 10*3/MM3 (ref 0.1–0.9)
MONOCYTES NFR BLD AUTO: 10.5 % (ref 5–12)
NEUTROPHILS NFR BLD AUTO: 2.34 10*3/MM3 (ref 1.7–7)
NEUTROPHILS NFR BLD AUTO: 60 % (ref 42.7–76)
NRBC BLD AUTO-RTO: 0 /100 WBC (ref 0–0.2)
PHOSPHATE SERPL-MCNC: 3 MG/DL (ref 2.5–4.5)
PLATELET # BLD AUTO: 217 10*3/MM3 (ref 140–450)
PMV BLD AUTO: 10.2 FL (ref 6–12)
POTASSIUM SERPL-SCNC: 4.5 MMOL/L (ref 3.5–4.7)
PROT SERPL-MCNC: 6.8 G/DL (ref 6.3–8)
RBC # BLD AUTO: 3.91 10*6/MM3 (ref 3.77–5.28)
SODIUM SERPL-SCNC: 143 MMOL/L (ref 134–145)
WBC NRBC COR # BLD: 3.9 10*3/MM3 (ref 3.4–10.8)

## 2023-06-08 PROCEDURE — 25010000002 DENOSUMAB 120 MG/1.7ML SOLUTION: Performed by: INTERNAL MEDICINE

## 2023-06-08 PROCEDURE — 85025 COMPLETE CBC W/AUTO DIFF WBC: CPT

## 2023-06-08 PROCEDURE — 96372 THER/PROPH/DIAG INJ SC/IM: CPT

## 2023-06-08 PROCEDURE — 84100 ASSAY OF PHOSPHORUS: CPT

## 2023-06-08 PROCEDURE — 83735 ASSAY OF MAGNESIUM: CPT

## 2023-06-08 PROCEDURE — 80053 COMPREHEN METABOLIC PANEL: CPT

## 2023-06-08 PROCEDURE — 36415 COLL VENOUS BLD VENIPUNCTURE: CPT

## 2023-06-08 RX ADMIN — DENOSUMAB 120 MG: 120 INJECTION SUBCUTANEOUS at 10:44

## 2023-06-08 NOTE — PROGRESS NOTES
Specialty Pharmacy Patient Management Program  Oncology 6-Month Clinical Assessment       Martha Davey is a 62 y.o. female with ER+ HER2/peter- metastatic breast cancer seen today to assess adherence and side effects.    Regimen: Xeloda (capecitabine) 1000 mg daily in the morning and 1500 mg daily at night for 7 days on and 7 days off    Reason for Outreach: Routine medication check-in .             Goals        Specialty Pharmacy General Goal      Progression free survival             Medication Assessment:  Medication Assessment     Administration: Patient is taking every day at the same time , twice daily, and as prescribed .   Patient can self administer medications: Yes  Medication Follow-Up Plan: Next clinical assessment  Lab Review: The labs listed below have been reviewed. No dose adjustments are needed for the oral specialty medication(s) based on the labs.    Lab Results   Component Value Date    GLUCOSE 104 05/08/2023    CALCIUM 9.9 05/08/2023     05/08/2023    K 4.9 (H) 05/08/2023    CO2 28.6 05/08/2023     05/08/2023    BUN 22 (H) 05/08/2023    CREATININE 1.06 05/08/2023    EGFRIFAFRI 85 11/12/2021    EGFRIFNONA 56 (L) 02/21/2022    BCR 20.8 05/08/2023    ANIONGAP 10.4 05/08/2023     Lab Results   Component Value Date    WBC 3.90 06/08/2023    RBC 3.91 06/08/2023    HGB 12.9 06/08/2023    HCT 40.5 06/08/2023    .6 (H) 06/08/2023    MCH 33.0 06/08/2023    MCHC 31.9 06/08/2023    RDW 15.7 (H) 06/08/2023    RDWSD 59.2 (H) 06/08/2023    MPV 10.2 06/08/2023     06/08/2023    NEUTRORELPCT 60.0 06/08/2023    LYMPHORELPCT 23.8 06/08/2023    MONORELPCT 10.5 06/08/2023    EOSRELPCT 4.4 06/08/2023    BASORELPCT 1.0 06/08/2023    AUTOIGPER 0.3 06/08/2023    NEUTROABS 2.34 06/08/2023    LYMPHSABS 0.93 06/08/2023    MONOSABS 0.41 06/08/2023    EOSABS 0.17 06/08/2023    BASOSABS 0.04 06/08/2023    AUTOIGNUM 0.01 06/08/2023    NRBC 0.0 06/08/2023     Drug-drug interactions  Completed  medication reconciliation today to assess for drug interactions. Patient denies starting or stopping any medications.    Assessed medication list for interactions, no significant drug interactions noted.   Advised patient to call the clinic if any new medications are started so we can assess for drug-drug interactions.  Drug-food interactions discussed:  no significant food and drug interactions noted.  Vaccines are coordinated by the patient's oncologist and primary care provider.    Allergies  Known allergies and reactions were discussed with the patient. The patient's chart has been reviewed for allergy information and updated as necessary.   Allergies   Allergen Reactions    Hydrocodone Nausea Only    Morphine And Related Nausea And Vomiting        Hospitalizations and Urgent Care Visits Since Last Assessment:  Unplanned hospitalizations or inpatient admissions: None  ED Visits: None  Urgent Office Visits: None    Adherence Assessment:   Patient reports 1-2 missed doses in the past 30 days     Quality of Life Assessment:     -- Quality of Life: 7/10    Financial Assessment:  Medication availability/affordability: Patient has had no issues obtaining medication from pharmacy.      All questions addressed and patient had no additional concerns. Patient has pharmacy contact information.    Name/Credentials Munira Cole PharmD Candidate    6/8/2023  09:42 EDT

## 2023-06-22 LAB
ALBUMIN SERPL-MCNC: 4.2 G/DL (ref 3.5–5.2)
ALBUMIN/GLOB SERPL: 2.1 G/DL
ALP SERPL-CCNC: 50 U/L (ref 39–117)
ALT SERPL-CCNC: 17 U/L (ref 1–33)
APPEARANCE UR: ABNORMAL
AST SERPL-CCNC: 17 U/L (ref 1–32)
BACTERIA #/AREA URNS HPF: ABNORMAL /HPF
BACTERIA UR CULT: ABNORMAL
BACTERIA UR CULT: ABNORMAL
BASOPHILS # BLD AUTO: 0.06 10*3/MM3 (ref 0–0.2)
BASOPHILS NFR BLD AUTO: 1 % (ref 0–1.5)
BILIRUB SERPL-MCNC: 0.3 MG/DL (ref 0–1.2)
BILIRUB UR QL STRIP: NEGATIVE
BUN SERPL-MCNC: 27 MG/DL (ref 8–23)
BUN/CREAT SERPL: 31 (ref 7–25)
CALCIUM SERPL-MCNC: 9.7 MG/DL (ref 8.6–10.5)
CASTS URNS QL MICRO: ABNORMAL /LPF
CHLORIDE SERPL-SCNC: 104 MMOL/L (ref 98–107)
CHOLEST SERPL-MCNC: 172 MG/DL (ref 0–200)
CO2 SERPL-SCNC: 32.2 MMOL/L (ref 22–29)
COLOR UR: YELLOW
CREAT SERPL-MCNC: 0.87 MG/DL (ref 0.57–1)
CRYSTALS URNS MICRO: ABNORMAL
EGFRCR SERPLBLD CKD-EPI 2021: 75.4 ML/MIN/1.73
EOSINOPHIL # BLD AUTO: 0.24 10*3/MM3 (ref 0–0.4)
EOSINOPHIL NFR BLD AUTO: 4.2 % (ref 0.3–6.2)
EPI CELLS #/AREA URNS HPF: ABNORMAL /HPF (ref 0–10)
ERYTHROCYTE [DISTWIDTH] IN BLOOD BY AUTOMATED COUNT: 15.4 % (ref 12.3–15.4)
GLOBULIN SER CALC-MCNC: 2 GM/DL
GLUCOSE SERPL-MCNC: 101 MG/DL (ref 65–99)
GLUCOSE UR QL STRIP: NEGATIVE
HCT VFR BLD AUTO: 37.3 % (ref 34–46.6)
HDLC SERPL-MCNC: 76 MG/DL (ref 40–60)
HGB BLD-MCNC: 12.6 G/DL (ref 12–15.9)
HGB UR QL STRIP: ABNORMAL
IMM GRANULOCYTES # BLD AUTO: 0.02 10*3/MM3 (ref 0–0.05)
IMM GRANULOCYTES NFR BLD AUTO: 0.3 % (ref 0–0.5)
KETONES UR QL STRIP: NEGATIVE
LDLC SERPL CALC-MCNC: 79 MG/DL (ref 0–100)
LEUKOCYTE ESTERASE UR QL STRIP: ABNORMAL
LYMPHOCYTES # BLD AUTO: 1.2 10*3/MM3 (ref 0.7–3.1)
LYMPHOCYTES NFR BLD AUTO: 20.9 % (ref 19.6–45.3)
MCH RBC QN AUTO: 33 PG (ref 26.6–33)
MCHC RBC AUTO-ENTMCNC: 33.8 G/DL (ref 31.5–35.7)
MCV RBC AUTO: 97.6 FL (ref 79–97)
MICRO URNS: ABNORMAL
MONOCYTES # BLD AUTO: 0.59 10*3/MM3 (ref 0.1–0.9)
MONOCYTES NFR BLD AUTO: 10.3 % (ref 5–12)
NEUTROPHILS # BLD AUTO: 3.63 10*3/MM3 (ref 1.7–7)
NEUTROPHILS NFR BLD AUTO: 63.3 % (ref 42.7–76)
NITRITE UR QL STRIP: POSITIVE
NRBC BLD AUTO-RTO: 0 /100 WBC (ref 0–0.2)
OTHER ANTIBIOTIC SUSC ISLT: ABNORMAL
PH UR STRIP: 6 [PH] (ref 5–7.5)
PLATELET # BLD AUTO: 231 10*3/MM3 (ref 140–450)
POTASSIUM SERPL-SCNC: 4.7 MMOL/L (ref 3.5–5.2)
PROT SERPL-MCNC: 6.2 G/DL (ref 6–8.5)
PROT UR QL STRIP: ABNORMAL
RBC # BLD AUTO: 3.82 10*6/MM3 (ref 3.77–5.28)
RBC #/AREA URNS HPF: ABNORMAL /HPF (ref 0–2)
SODIUM SERPL-SCNC: 143 MMOL/L (ref 136–145)
SP GR UR STRIP: 1.02 (ref 1–1.03)
TRIGL SERPL-MCNC: 95 MG/DL (ref 0–150)
TSH SERPL DL<=0.005 MIU/L-ACNC: 2.3 UIU/ML (ref 0.27–4.2)
UNIDENT CRYS URNS QL MICRO: PRESENT /LPF
URINALYSIS REFLEX: ABNORMAL
UROBILINOGEN UR STRIP-MCNC: 0.2 MG/DL (ref 0.2–1)
VLDLC SERPL CALC-MCNC: 17 MG/DL (ref 5–40)
WBC # BLD AUTO: 5.74 10*3/MM3 (ref 3.4–10.8)
WBC #/AREA URNS HPF: >30 /HPF (ref 0–5)

## 2023-07-24 ENCOUNTER — SPECIALTY PHARMACY (OUTPATIENT)
Dept: PHARMACY | Facility: HOSPITAL | Age: 63
End: 2023-07-24
Payer: MEDICARE

## 2023-07-24 NOTE — PROGRESS NOTES
[facility] physician declaration form signed and sent back to "[employer] [place]".   Ami Hudson  Specialty Pharmacy Technician

## 2023-07-27 ENCOUNTER — HOSPITAL ENCOUNTER (OUTPATIENT)
Dept: MRI IMAGING | Facility: HOSPITAL | Age: 63
Discharge: HOME | End: 2023-07-27
Payer: COMMERCIAL

## 2023-07-27 ENCOUNTER — HOSPITAL ENCOUNTER (OUTPATIENT)
Dept: MRI IMAGING | Facility: HOSPITAL | Age: 63
Discharge: HOME | End: 2023-07-27
Payer: MEDICARE

## 2023-07-27 DIAGNOSIS — C50.811 MALIGNANT NEOPLASM OF OVERLAPPING SITES OF RIGHT BREAST IN FEMALE, ESTROGEN RECEPTOR NEGATIVE: ICD-10-CM

## 2023-07-27 DIAGNOSIS — Z17.1 MALIGNANT NEOPLASM OF OVERLAPPING SITES OF RIGHT BREAST IN FEMALE, ESTROGEN RECEPTOR NEGATIVE: ICD-10-CM

## 2023-07-27 PROCEDURE — 0 GADOBENATE DIMEGLUMINE 529 MG/ML SOLUTION: Performed by: INTERNAL MEDICINE

## 2023-07-27 PROCEDURE — A9577 INJ MULTIHANCE: HCPCS | Performed by: INTERNAL MEDICINE

## 2023-07-27 PROCEDURE — 72197 MRI PELVIS W/O & W/DYE: CPT

## 2023-07-27 PROCEDURE — 72157 MRI CHEST SPINE W/O & W/DYE: CPT

## 2023-07-27 RX ADMIN — GADOBENATE DIMEGLUMINE 16 ML: 529 INJECTION, SOLUTION INTRAVENOUS at 14:28

## 2023-07-31 ENCOUNTER — TELEPHONE (OUTPATIENT)
Dept: ONCOLOGY | Facility: CLINIC | Age: 63
End: 2023-07-31

## 2023-07-31 NOTE — TELEPHONE ENCOUNTER
Caller: Martha Davey    Relationship to patient: Self    Best call back number: [phone]     Chief complaint: PT WILL BE OUT OF TOWN AND NEES TO [place]    Type of visit: LAB, FOLLOW UP AND INJECTION    Requested date: FIRST AVAILABLE AFTER 08/14/23    If rescheduling, when is the original appointment: 08/07/23    Additional notes: PT IS WILLING TO GO TO [email or web address] OR [email or web address]. PLEASE CALL BACK WITH NEW APPT DATE/TIME.

## 2023-08-01 DIAGNOSIS — R53.0 NEOPLASTIC MALIGNANT RELATED FATIGUE: ICD-10-CM

## 2023-08-01 DIAGNOSIS — F33.1 MAJOR DEPRESSIVE DISORDER, RECURRENT EPISODE, MODERATE: ICD-10-CM

## 2023-08-01 RX ORDER — MODAFINIL 200 MG/1
200 TABLET ORAL DAILY
Qty: 30 TABLET | Refills: 2 | Status: SHIPPED | OUTPATIENT
Start: 2023-08-01

## 2023-08-11 NOTE — TELEPHONE ENCOUNTER
PATIENT CALLED BACK, SHE WONT BE BACK OUT OF TOWN YET AND WANTED TO KNOW IF SHE CAN BE WORKED IN ON 8-17-23 OR 8-18-23

## 2023-08-17 DIAGNOSIS — G89.3 CANCER ASSOCIATED PAIN: ICD-10-CM

## 2023-08-17 RX ORDER — TRAMADOL HYDROCHLORIDE 50 MG/1
TABLET ORAL
Qty: 90 TABLET | Refills: 0 | Status: SHIPPED | OUTPATIENT
Start: 2023-08-17

## 2023-08-22 ENCOUNTER — TELEMEDICINE (OUTPATIENT)
Dept: PSYCHIATRY | Facility: CLINIC | Age: 63
End: 2023-08-22
Payer: MEDICARE

## 2023-08-22 DIAGNOSIS — F33.1 MAJOR DEPRESSIVE DISORDER, RECURRENT EPISODE, MODERATE: Primary | ICD-10-CM

## 2023-08-22 DIAGNOSIS — F41.1 GENERALIZED ANXIETY DISORDER: ICD-10-CM

## 2023-08-22 PROCEDURE — 90834 PSYTX W PT 45 MINUTES: CPT | Performed by: COUNSELOR

## 2023-08-22 NOTE — H&P (VIEW-ONLY)
"Chief Complaint  Previous Stage Ib ([id number]) ER/RI positive, HER-2/peter negative right breast cancer with subsequent metastatic disease identified 10/8/2017, history of right pulmonary embolism, cancer related pain, chemotherapy-induced diarrhea, chemotherapy-induced mucositis, chemotherapy-induced hand-foot syndrome    Subjective        History of Present Illness  The patient returns today in delayed follow-up continuing on oral Xeloda 1000 mg in the morning, 1500 mg in the evening for 7 days on followed by 7 days off as well as monthly Xgeva and Eliquis 5 mg twice daily.  The patient is currently beginning a \"off\" week of Xeloda today.  She was due a little over 2 weeks ago for Xgeva and was supposed to have been seen at that time to review her MRI thoracic spine and pelvis from 7/27/2023.  Patient however went on a vacation and returned just recently, rescheduled her appointment.  Prior to leaving for vacation she was seen in an urgent care for dysuria and urinary frequency with urinalysis that suggested UTI and was given a course of Augmentin.  She took some of the Augmentin prior to her departure and forgot the medication, took the remaining 5 pills when she returned last week.  She is still having some urinary frequency at this time.  In the interim the patient notes that she has had considerable difficulty with family issues and is very upset about this, her brother has been giving her difficulties regarding care of their mother.  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  She does not have any persistent location of pain.  She does take tramadol however notes that she takes periodic Advil and Tylenol, is aware that Advil is contraindicated with ongoing anticoagulation.  We did previously prescribe hydrocodone 5/325 however she reports that this does produce nausea and she does not like to take narcotics.  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  She does have some chronic fatigue which is stable.      Objective   Vital Signs:   /84   Pulse 84   Temp 97.8 °F (36.6 °C) (Temporal)   Resp 18   Ht 154.9 cm (60.98\")   Wt 81.4 kg (179 lb 8 oz)   SpO2 99%   BMI 33.93 kg/m²     Physical Exam  Constitutional:       Appearance: She is well-developed.      Comments: Patient ambulates with the use of a cane   Eyes:      Conjunctiva/sclera: Conjunctivae normal.   Neck:      Thyroid: No thyromegaly.   Cardiovascular:      Rate and Rhythm: Normal rate and regular rhythm.      Heart sounds: No murmur heard.    No friction rub. No gallop.   Pulmonary:      Effort: No respiratory distress.      Breath sounds: Normal breath sounds.   Abdominal:      General: Bowel sounds are normal. There is no distension.      Palpations: Abdomen is soft.      Tenderness: There is no abdominal tenderness.   Musculoskeletal:      Comments: Braces in place in the bilateral lower extremities   Lymphadenopathy:      Head:      Right side of head: No submandibular adenopathy.      Cervical: No cervical adenopathy.      Upper Body:      Right upper body: No supraclavicular adenopathy.      Left upper body: No supraclavicular adenopathy.   Skin:     General: Skin is warm and dry.      Findings: Rash present.      Comments: Erythema involving the palms is currently minimal.  The degree of skin peeling is currently minimal.  There is minimal erythema involving the dorsal aspect of the fingers.  Sclerodactyly does persist and appears stable.    Neurological:      Mental Status: She is alert and oriented to person, place, and time.      Cranial Nerves: No cranial nerve deficit.      Motor: No abnormal muscle tone.      Deep Tendon Reflexes: Reflexes normal.   Psychiatric:         Behavior: Behavior normal.       Result Review : Reviewed CBC, CMP, magnesium, phosphorus from today.  " Reviewed CA 15-3 and [id number] results from 7/10/2023.  Reviewed MRI thoracic spine and pelvis from 7/27/2023.     Assessment and Plan     *Previous Stage Ib ([id number]) right breast cancer:  Diagnosed May 2010 with excisional biopsy for invasive ductal carcinoma, 1.3 cm, grade 2, ER 90%, AL 80%, HER-2/peter negative (1+ IHC).    Subsequent right mastectomy in July 2010 with no residual breast malignancy, 1/5 sentinel lymph node with micrometastasis (0.25 mm).    Treated in the [facility] system with adjuvant AC ×4 cycles in 2010 (no taxanes administered due to underlying Charcot-Saloni-Tooth with peripheral neuropathy).    Adjuvant Femara (postmenopausal) initiated October 2010 with plan to treat ×10 years.    Genetic testing reportedly negative.    Developed osteopenia treated with Prolia beginning 2/27/13. Subsequently discontinued due to identification of metastatic disease.    *Recurrent/metastatic disease identified 10/8/17:  Disease involving thoracic spine with cord compression at T6, lumbosacral involvement, sternal and right sternoclavicular involvement.    Femara discontinued in 10/2017.    Radiation administered (in the [facility] system) to the thoracic spine beginning 10/19/17 treating T3-T9 to a dose of 24 gray in 6 fractions.  Evaluation with MRI 12/8/17 showing persistent T6 cord compression with persistent neurologic compromise requiring surgical treatment 12/11/17 with T6 laminectomy/corpectomy and T3-T9 fusion.  Pathology with metastatic carcinoma of breast origin, ER negative, AL negative, HER-2/peter negative (1+ IHC).  Additional staging evaluation 12/8/17 with no evidence of visceral metastatic disease, bone scan showing involvement of thoracic spine, sternum, left humerus, mid frontal bone.  No plane film correlate of left humerus lesion.  MRI lumbar spine with small intradural L3 metastasis.  CA 15-3 12/6/17- 17.  Palliative radiation therapy to L3 dural metastasis and left humerus initiated 1/15/18  (10 fractions), completed 1/26/18.  Hypercalcemia of malignancy with calcium in the 10-11 range.  Initiation of monthly Xgeva 1/23/18.  Baseline CT scan 1/30/18 with no evidence of visceral involvement.  Cluster of nodular opacities in the right lower lobe suspected to be infectious or related to bronchiolitis. Bone scan 1/30/18 showed postsurgical change in the thoracic spine, stable uptake in the frontal bone, no new areas of disease.  Initiation of palliative oral single agent Xeloda 2/7/18 2000 mg a.m., 1500 mg p.m. for 14/21 days.   Following 3 cycles xeloda, bone scan 4/4/18 showed no change from the prior study.  CT scan 4/4/18 showed a small pericardial effusion of unclear significance as well as a subcutaneous nodule in the right lateral chest wall.  Subsequent evaluation with echocardiogram 4/17/18 showed no evidence of pericardial effusion.  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  We discussed the possibility of surgical excision of the right subcutaneous chest wall lesion for more definitive diagnosis.  Reviewed previous CT images dating back to 12/8/17 and the lesion was present even at that time measuring around 1.7 cm although not commented on in the radiology report.  As this appears to be an indolent low-grade process unrelated to her breast cancer, recommendee foregoing surgical excision at this time and monitoring this area on future scans.  The patient agreed.    Following 6 cycles of Xeloda, CT 6/6/18  showed stable findings, no evidence of progressive disease.  There was a comment regarding subcutaneous abnormality in the anterior abdominal wall and this was related to Lovenox injection sites.  Bone scan 6/6/18 showed no interval change.   CT scan and bone scan 8/13/18 following 9 cycles of Xeloda showed stable findings with no evidence of  significant visceral metastases.  Her bone lesions appear stable on bone scan.  The spindle cell neoplasm in her right chest wall actually decreased in size from 2 cm down to 1.6 cm.    The patient experienced some symptoms of diarrhea, anorexia, generalized weakness during cycle 9 Xeloda it was unclear whether this was related to a viral gastroenteritis or toxicity from treatment.  Symptoms recurred during cycle 10 and treatment was cut short by 2 days.  Symptoms attributed to Xeloda.  With cycle 11, dose and schedule altered to 1500 mg twice daily for 7 days on followed by 7 days off .  CT scan 9/9/2020 with no significant changes.  Bone scan 9/9/2020 read as unchanged from prior studies however did note an area of slight activity in the medial left femur.  Contacted radiology and although this was not noted on prior reports appears to have been present.  Subsequent MRI left femur 9/21/2020 with cortical thickening and periosteal edema left iliac us muscle insertion to the medial left femur with no evidence of metastatic disease, favored to represent periosteal change secondary to insertional tendinitis.  Severe hand-foot symptoms causing sclerodactyly and limitation in finger movement prompted change in dosing in July 2021 with Xeloda decreased to 1000 mg a.m., 1500 mg p.m. for 7 days on followed by 7 days off.  Patient was experiencing severe hand-foot syndrome related to Xeloda having developed skin peeling, sclerodactyly with limited use of her hands.  On 3/31/2022, Xeloda was held to allow for recovery.  Patient resumed Xeloda on 4/18/2022.  Patient required hospitalization 5/29 - 6/1/2022 with presumed viral gastroenteritis that was exacerbated by Xeloda.  Xeloda held briefly, resumed on 6/6/2022.  Patient again with worsening sclerodactyly, Xeloda held for 2 weeks from 10/3 - 10/17/2022, resumed at prior dose with improvement in symptoms.  Scans on 3/3/2023 with CT chest abdomen pelvis and bone scan showing  "stable findings.  CT chest abdomen pelvis 7/3/2023 with questionable 1 mm change in size right lower lobe nodule.  Additional small pulmonary nodules stable.  Stable bony metastatic disease.  Bone scan on 7/7/2023 however showed slight progression focal involvement right ischium and superior pubic ramus (new ischial lesion superior pubic ramus compared to 10/24/2022 and more conspicuous compared to 3/3/2023).  Metastatic lesion right inferior pubic ramus and ischial tuberosity increased in size since October 2022 however stable from 3/3/2023.  MRI cervical spine 7/3/2023 with no metastatic involvement however identification of the lesion at T2, recommended dedicated thoracic spine MRI to evaluate further.  Given the minimal extent and slow degree of progression, elected to continue oral Xeloda and evaluate further with MRI pelvis and thoracic spine.  Consideration of biopsy pelvic metastasis for repeat ER/SD/HER2/peter testing.    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  CA 15-3 on 7/10/2023 was 12.2, uninformative.  MRI thoracic spine 7/27/2023 with 1 cm metastatic focus seen in L1  MRI pelvis 7/27/2023 with metastatic foci identified right superior pubic ramus, right ischial tuberosity, inferior pubic ramus, L5 body.  Chronic appearing posttraumatic and/or degenerative change in the posterior right ilium adjacent SI joint.  The patient returns today continuing on treatment with Xeloda 1000 mg a.m., 1500 mg p.m. 7 days on followed by 7 days off.  She also continues on monthly Xgeva, a little over 2 weeks overdue today due to recent vacation.  Patient is currently initiating a \"off\" week of Xeloda.  Patient's visit today is slightly delayed due to recent vacation.  She appears to be fairly stable in terms of tolerating Xeloda with -foot symptoms.  She does have some migratory pain affecting her upper extremities, legs, shoulders and occasionally her " neck but no persistent pain in any specific location.  We again today reviewed her most recent bone scan result which showed for the first time evidence of slight progression in her right ischial metastasis in the superior pubic ramus and some more remote slight change in the right inferior pubic ramus compared to prior study from October 2022.  Given the limited degree of progression and ongoing clinical benefit from Xeloda was elected to continue her current treatment.  We did send off [id number] analysis on peripheral blood 7/10/2023 however this was fairly uninformative as noted above.  We discussed that repeat tissue sampling would be beneficial to reassess ER/MD and HER2/peter status of her disease in addition to NGS analysis for potential targeted therapy options.  We discussed difficulties regarding bone tissue in terms of hormonal testing and NGS testing.  Patient is willing to proceed and we will schedule biopsy likely of the progressive right superior pubic ramus lesion via CT guidance within the next week and we will request ER/MD and HER2/peter analysis as well as [facility] NGS analysis.  We will notify interventional radiology and pathology for appropriate handling of the bone tissue for this testing.  We also discussed pursuit of a 2-month interval CT and bone scan which we will have performed in 2 weeks prior to her return visit here in 3 weeks.  We did discuss that if her disease progresses further and remains ER/MD and HER2/peter negative, we would need to consider options for IV chemotherapy which would involve Mediport placement as well.  The patient had multiple questions which were answered to her satisfaction.    *Right pulmonary embolism:  Diagnosed on CT angiogram 10/21/17 involving small right lower lobe pulmonary artery.  Lower extremity Dopplers negative.  Bilateral lower extremity Dopplers negative again 12/5/17.  Received chronic Lovenox 1 mg/kg twice per day, transition to oral Eliquis in  February 2019, continuing on Eliquis 5 mg twice daily.  Patient continues on anticoagulation without any complications.  Patient is aware of need to hold Eliquis for 48 hours prior to upcoming biopsy and can resume the day after.    *Cancer related pain:  Previously receiving Duragesic 50 µg patch every 72 hours along with Dilaudid 4 mg as needed for breakthrough pain  The patient's pain improved over time and she was able to discontinue both Duragesic and Dilaudid in the interval.  Patient does take occasional Flexeril at bedtime due to back spasm/pain when she has been more active.  The patient does have some occasional aggravation of her chronic back pain and does use tramadol 50 mg every 8 hours as needed  Patient does use tramadol 50 mg every 8-12 hours as needed in addition to hydrocodone 5/325 every 4 hours as needed.  Patient today notes that her pain has been quite bothersome but is migratory in nature.  She does take Advil and Tylenol intermittently in addition to her tramadol.  We again today discussed contraindication of NSAIDs in the setting of her anticoagulation.  She developed nausea from hydrocodone and therefore we will discontinue this and prescribed Percocet 5/325 instead.    *Chemotherapy-induced diarrhea with subsequent C. difficile colitis in the setting of previous ulcerative colitis and then identification of enteropathogenic E. coli:  Patient with reported history of ulcerative colitis, continues on mesalamine.  The patient developed initial diarrhea related to Xeloda at regular dosing.  Symptoms improved on reduced dose Xeloda  Flare of symptoms in October 2018 with apparent finding of C. difficile colitis by GI, treated with course of oral vancomycin with improvement in symptoms.  Patient notes minimal intermittent diarrhea/loose stools on Xeloda requiring occasional dosing of Imodium.    Patient required hospitalization 5/29 - 6/1/2022 with presumed viral gastroenteritis that was  exacerbated by Xeloda.  Xeloda held briefly, resumed on 6/6/2022.  Patient's  developed C. difficile colitis and patient was experiencing increase in diarrhea.  Stool studies performed 8/4/22 negative C. difficile however GI PCR was positive for enteropathogenic E. coli.  Given patient's symptoms and immunocompromise status it was elected to treat her with azithromycin 500 mg daily x3 days beginning 8/5/2022  Diarrhea resolved  Patient underwent colonoscopy on 2/28/2023 with findings of diverticulosis    *Traumatic left tibia/fibular fracture:  Status post ORIF 12/6/17  Specimen was sent for pathologic review, negative for evidence of malignancy    *Hypercalcemia:  Suspect hypercalcemia of malignancy, calcium in  10-11 range previously.  Calcium normalized following initiation of monthly Xgeva on 1/23/18.    *Chemotherapy-induced mucositis:  Patient had a minimal degree of mucositis with cycle 2.  The patient has magic mouthwash to use as needed.  No subsequent mucositis.    *Recurrent UTI, bladder wall thickening on CT:  Patient had an enterococcal UTI on 3/2/18 sensitive to nitrofurantoin and received treatment, unclear how long.  Recurrent UTI 3/20/18 with urine culture growing Klebsiella, initially treated with nitrofurantoin, transitioned to Levaquin.  CT 4/4/18 with diffuse bladder wall thickening with increased nodular thickening at the left base.  Referral to urogynecology Dr. May Johnson.  She was placed on a prophylactic dose of trimethoprim 100 mg daily, bladder wall thickening felt to be related to recent recurrent urinary tract infections.  Patient with urinary symptoms, treated with course of Macrobid at the end of December 2018, urine culture however was negative 12/31/18.  Patient was found to have Klebsiella UTI 7/29/2019 which was successfully treated with Macrobid with complete resolution of symptoms.  CT 8/10/2021 with diffuse bladder wall thickening new from 5/17/2021 (however seen on  multiple prior scans).    UTI on 10/18/2021 with E. coli greater than 100,000 colonies that was pansensitive, treated with Macrobid x7 days  With ongoing/recurrent UTIs patient was seen by urogynecology and initiated suppressive therapy with trimethoprim 100 mg daily in December 2021.  She has not experienced any further urinary tract infections.  CT abdomen pelvis 3/28/2022 does show diffuse thickening of urinary bladder as has been seen on prior studies indicative of cystitis.  Patient notes that she did stop taking trimethoprim on a daily basis.    Patient with urine culture on 5/5/2023 that grew E. coli and she received antibiotic treatment from urogynecology.    Prior to departing for vacation, patient had urinalysis suspicious for UTI on 7/30/2022.  She received a prescription for Augmentin and took part of the prescription prior to her departure but forgot her antibiotic and completed 5 additional doses which she returned last week.  She is having some continued urinary frequency and we will check urinalysis and urine culture today to determine if any additional antibiotic therapy is warranted.    *Mobility:  The patient underwent an intensive course of rehabilitation at [facility].  She graduated from her outpatient course November 2018.  Overall the patient has improved dramatically in terms of mobility,   Patient developed significant callus formation lateral aspect of the left plantar surface.  She did meet with her brace specialist and additional padding was added in this area.  She continues to follow-up with her podiatrist Dr. Ware.    *Depression:  The patient is continuing follow-up in the supportive oncology clinic and is currently continuing on Cymbalta 30 mg twice daily as well as gabapentin 600 mg nightly, temazepam 15 mg nightly as needed, Provigil 100 mg daily.  Patient does continue to attend support groups at [facility].  The patient does continue routine follow-up in supportive oncology  clinic.    Patient does have multiple stressors with her 's malignancy and chemotherapy as well as her autistic son who is living with them at home.  Patient continues to have difficulty with stress and anxiety, is being followed by supportive oncology, due to be seen next on 8/25/2023.    *Hand-foot syndrome secondary to Xeloda:  Patient continues with frequent application of emollient cream to the hands and feet  Symptoms increased significantly requiring a 1 week delay in cycle 18 Xeloda as noted above.  Symptoms did improve and she continued on the same dose.    Patient was referred to dermatology and has been continuing on triamcinolone 0.1% cream used for 1 week on followed by 1 week off which led to some further improvement.   Progressive palmar erythema with development of sclerodactyly and effect on dexterity.  Addition of urea-based cream 3 times daily.  Patient with continued difficulty regarding erythema of her hands that extended onto the dorsal aspect and was causing contractures/sclerodactyly in her fingers affecting her dexterity.  In July 2021, held Xeloda for an additional week and then reduced dose from 1500 mg twice daily down to 1000 mg a.m. and 1500 mg p.m. and continued on a 7-day on followed by 7-day off schedule.  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  On 3/31/2022, Xeloda was held to allow for recovery.  Patient resumed Xeloda on 4/18/2022.  Patient developed worsening sclerodactyly affecting dexterity that required Xeloda to again be briefly held for 2 weeks from 10/3 - 10/17/2022.  Treatment resumed at prior dose after improvement in symptoms.  Patient continues to use emollient cream frequently (currently 5 times daily) and topical  triamcinolone 0.1% twice daily intermittently (2 weeks on followed by 2 weeks off as instructed by dermatology).  Symptoms currently stable with mild erythema of the palms of the hands and decrease and skin peeling.  Minimal erythema currently on the dorsal aspect of the fingers.  Sclerodactyly and effect on dexterity stable.    *Evidence of steatosis on scans with mild intermittent elevated liver function studies:  Liver function studies increased 8/20/19 with ALT 98, AST 70, normal total bilirubin.  Negative viral hepatitis A, B, and C panel 8/23/18  Likely related to hepatic steatosis.   Subsequent improvement in LFTs  LFTs today are normal    *Chemotherapy induced leukopenia:  WBC today 6.56 with normal differential    *GERD, question of celiac disease:  Patient with significant history of reflux  Patient currently receiving Protonix 40 mg daily daily and Carafate 1 g 4 times daily as needed  Patient required hospitalization 5/29 - 6/1/2022 with presumed viral gastroenteritis that was exacerbated by Xeloda.    EGD performed 5/31/2022 during hospitalization with biopsy that showed focal blunting of villi and atrophy with slight increase in intraepithelial lymphocytes.  Changes were minimal but recommended correlation with serology to exclude celiac disease.  Celiac serology 8/8/2022 negative  Patient previously developed worsening reflux symptoms, temporarily increase Protonix to 40 mg twice daily and we did add Carafate 1 g 4 times daily.    She is taking Protonix 40 mg once daily, is not taking Carafate.    *Insomnia:  Patient with prior paradoxical reaction to Benadryl  Improved previously on temazepam 15 mg nightly as needed.   Patient noted subsequently that temazepam was having no effect.   Patient increased gabapentin to 900 mg nightly, discontinued temazepam    *Health maintenance:  Patient notes that she has a history of colon polyps as well as ulcerative colitis and was due for a follow-up colonoscopy  on 9/12/2019.  We did discuss there is no necessity to pursue colonoscopy in the setting of her metastatic breast cancer.    The patient did undergo colonoscopy on 2/7/2020 with findings of muscular hypertrophy and diverticulosis.   Patient did wish to proceed with further colonoscopic evaluation, performed on 12/20/2022 with poor prep.  Repeat 2/28/2023 with diverticulosis.    *Bilateral shoulder pain:  Chronic difficulty with right shoulder although she has not complained of this in prior visits to our office.  She reported difficulty with abduction.    The patient did undergo MRI of her right shoulder on 11/21/2019 at an outside facility showing multiple abnormalities including supraspinatus tendinosis, labral tear but no evidence of metastatic disease.  Patient developed pain in the left shoulder, was seen by orthopedics and underwent steroid injection with some improvement.  Right shoulder stable.  Patient did undergo physical therapy with some improvement.  She continues to use tramadol 50 mg every 8-12 hours as needed.  Note that current CT 10/20/2022 made notation of left shoulder probable bursitis.    Patient was experiencing increased pain in the left shoulder however this has improved to some degree.    *Ocular changes in part related to Xeloda:  Patient experienced a mild degree of blurred vision as well as burning and pruritus.  She was seen by her ophthalmologist and was placed on xiidra ophthalmic drops which have helped.  Likely both issues are to some extent related to Xeloda.  Symptoms did worsen and patient was seen by ophthalmologist at [facility].  She is now using an ocular lubricant at night in addition to artificial tears which have helped.  Symptoms currently stable to improved    *Elevated glucose:  It is noted the patient's glucose at the last few visits has been in the high 100 range, postprandial.  She has had a hemoglobin A1c that has been in the high 5-6 range in the  "past, on 5/1/2019 at 5.7  Hemoglobin A1c was last checked in 11/12/2021 at 5.8    *Possible loosening of pedicle screw at T3:  CT cervical spine 5/17/2021 showed loosening of the left pedicle screw at T3.  Patient referred to orthopedics and was seen by Dr. Nayak who felt that there were no concerning findings on the scan    *Iron deficiency anemia  Labs on 5/2/2022 with hemoglobin 10.8.  Additional labs with iron 48, ferritin 21.2, iron saturation 11%, TIBC 4 and 32, folate greater than 20, B12 greater than 2000.    Attempted treatment with oral iron daily however patient was intolerant  Patient subsequently received Venofer 300 mg weekly x4 beginning 6/6/2022 and completed on 6/27/2022  Subsequent iron studies on 7/11/2022 showed improvement with iron 62, ferritin 345, iron saturation 18%, TIBC 336.  Hemoglobin had improved up to 12.7 at that time.  Repeat iron studies on 10/3/2022 showed iron replete status with hemoglobin 13.7, iron 121, ferritin 208.7, iron saturation 31%, TIBC 392.  Hemoglobin currently normal at 12.4     Plan:  Continue palliative single agent Xeloda 1000 mg a.m., 1500 mg in the p.m. 7 days on followed by 7 days off (patient currently beginning a \"off\" week of treatment today)  Resume monthly Xgeva today (over 2 weeks delayed)  Continue Eliquis 5 mg twice daily  The patient will continue frequent use of emollient cream currently 5 times daily and continue periodic triamcinolone cream 0.1% twice daily (provided by dermatology) 2 weeks on followed by 2 weeks off as prescribed  Continue Protonix 40 mg daily  Continue ocular lubricating gel nightly per ophthalmology  Continue Provigil 100 mg daily and Cymbalta 30 mg twice daily.  Patient continues routine follow-up with supportive oncology   Patient will continue gabapentin 900 mg at night.   Patient continues on tramadol 50 mg every 8 hours as needed for pain  Patient advised to discontinue use of Advil due to ongoing " anticoagulation  Prescription sent for Percocet 5/325 every 4 hours as needed for pain (reports nausea from hydrocodone she received previously)  The patient continues follow-up with her podiatrist and brace specialist regarding callus formation and scaling in the lateral left plantar surface  Urinalysis and culture today.  We will contact patient if further antibiotic therapy is warranted in the setting of her ongoing urinary frequency.  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  Patient will hold Eliquis 48 hours prior to the procedure.  Specimen to be sent for pathologic review including ER/ND and HER2/peter analysis as well as Tempus analysis (we are notifying interventional radiology and pathology for proper handling of specimen for the above testing).  In 2 weeks CT chest abdomen pelvis and bone scan  In 3 weeks MD visit with CBC, CMP.  We will review the above results and discuss whether change in treatment from Xeloda is warranted.     Patient continuing on high risk medication requiring intensive monitoring.       I did spend 65 minutes in total time caring for this patient today, time spent in review of records, face-to-face consultation, coordination of care, placement of orders, completion of documentation.

## 2023-08-22 NOTE — PROGRESS NOTES
"Chief Complaint  Previous Stage Ib ([id number]) ER/AR positive, HER-2/peter negative right breast cancer with subsequent metastatic disease identified 10/8/2017, history of right pulmonary embolism, cancer related pain, chemotherapy-induced diarrhea, chemotherapy-induced mucositis, chemotherapy-induced hand-foot syndrome    Subjective        History of Present Illness  The patient returns today in delayed follow-up continuing on oral Xeloda 1000 mg in the morning, 1500 mg in the evening for 7 days on followed by 7 days off as well as monthly Xgeva and Eliquis 5 mg twice daily.  The patient is currently beginning a \"off\" week of Xeloda today.  She was due a little over 2 weeks ago for Xgeva and was supposed to have been seen at that time to review her MRI thoracic spine and pelvis from 7/27/2023.  Patient however went on a vacation and returned just recently, rescheduled her appointment.  Prior to leaving for vacation she was seen in an urgent care for dysuria and urinary frequency with urinalysis that suggested UTI and was given a course of Augmentin.  She took some of the Augmentin prior to her departure and forgot the medication, took the remaining 5 pills when she returned last week.  She is still having some urinary frequency at this time.  In the interim the patient notes that she has had considerable difficulty with family issues and is very upset about this, her brother has been giving her difficulties regarding care of their mother.  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  She does not have any persistent location of pain.  She does take tramadol however notes that she takes periodic Advil and Tylenol, is aware that Advil is contraindicated with ongoing anticoagulation.  We did previously prescribe hydrocodone 5/325 however she reports that this does produce nausea and she does not like to take narcotics.  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  She does have some chronic fatigue which is stable.      Objective   Vital Signs:   /84   Pulse 84   Temp 97.8 øF (36.6 øC) (Temporal)   Resp 18   Ht 154.9 cm (60.98\")   Wt 81.4 kg (179 lb 8 oz)   SpO2 99%   BMI 33.93 kg/mý     Physical Exam  Constitutional:       Appearance: She is well-developed.      Comments: Patient ambulates with the use of a cane   Eyes:      Conjunctiva/sclera: Conjunctivae normal.   Neck:      Thyroid: No thyromegaly.   Cardiovascular:      Rate and Rhythm: Normal rate and regular rhythm.      Heart sounds: No murmur heard.    No friction rub. No gallop.   Pulmonary:      Effort: No respiratory distress.      Breath sounds: Normal breath sounds.   Abdominal:      General: Bowel sounds are normal. There is no distension.      Palpations: Abdomen is soft.      Tenderness: There is no abdominal tenderness.   Musculoskeletal:      Comments: Braces in place in the bilateral lower extremities   Lymphadenopathy:      Head:      Right side of head: No submandibular adenopathy.      Cervical: No cervical adenopathy.      Upper Body:      Right upper body: No supraclavicular adenopathy.      Left upper body: No supraclavicular adenopathy.   Skin:     General: Skin is warm and dry.      Findings: Rash present.      Comments: Erythema involving the palms is currently minimal.  The degree of skin peeling is currently minimal.  There is minimal erythema involving the dorsal aspect of the fingers.  Sclerodactyly does persist and appears stable.    Neurological:      Mental Status: She is alert and oriented to person, place, and time.      Cranial Nerves: No cranial nerve deficit.      Motor: No abnormal muscle tone.      Deep Tendon Reflexes: Reflexes normal.   Psychiatric:         Behavior: Behavior normal.       Result Review : Reviewed CBC, CMP, magnesium, phosphorus from today.  " Reviewed CA 15-3 and [id number] results from 7/10/2023.  Reviewed MRI thoracic spine and pelvis from 7/27/2023.     Assessment and Plan     *Previous Stage Ib ([id number]) right breast cancer:  Diagnosed May 2010 with excisional biopsy for invasive ductal carcinoma, 1.3 cm, grade 2, ER 90%, MN 80%, HER-2/peter negative (1+ IHC).    Subsequent right mastectomy in July 2010 with no residual breast malignancy, 1/5 sentinel lymph node with micrometastasis (0.25 mm).    Treated in the [facility] system with adjuvant AC x4 cycles in 2010 (no taxanes administered due to underlying Charcot-Saloni-Tooth with peripheral neuropathy).    Adjuvant Femara (postmenopausal) initiated October 2010 with plan to treat x10 years.    Genetic testing reportedly negative.    Developed osteopenia treated with Prolia beginning 2/27/13. Subsequently discontinued due to identification of metastatic disease.    *Recurrent/metastatic disease identified 10/8/17:  Disease involving thoracic spine with cord compression at T6, lumbosacral involvement, sternal and right sternoclavicular involvement.    Femara discontinued in 10/2017.    Radiation administered (in the [facility] system) to the thoracic spine beginning 10/19/17 treating T3-T9 to a dose of 24 gray in 6 fractions.  Evaluation with MRI 12/8/17 showing persistent T6 cord compression with persistent neurologic compromise requiring surgical treatment 12/11/17 with T6 laminectomy/corpectomy and T3-T9 fusion.  Pathology with metastatic carcinoma of breast origin, ER negative, MN negative, HER-2/peter negative (1+ IHC).  Additional staging evaluation 12/8/17 with no evidence of visceral metastatic disease, bone scan showing involvement of thoracic spine, sternum, left humerus, mid frontal bone.  No plane film correlate of left humerus lesion.  MRI lumbar spine with small intradural L3 metastasis.  CA 15-3 12/6/17- 17.  Palliative radiation therapy to L3 dural metastasis and left humerus initiated 1/15/18  (10 fractions), completed 1/26/18.  Hypercalcemia of malignancy with calcium in the 10-11 range.  Initiation of monthly Xgeva 1/23/18.  Baseline CT scan 1/30/18 with no evidence of visceral involvement.  Cluster of nodular opacities in the right lower lobe suspected to be infectious or related to bronchiolitis. Bone scan 1/30/18 showed postsurgical change in the thoracic spine, stable uptake in the frontal bone, no new areas of disease.  Initiation of palliative oral single agent Xeloda 2/7/18 2000 mg a.m., 1500 mg p.m. for 14/21 days.   Following 3 cycles xeloda, bone scan 4/4/18 showed no change from the prior study.  CT scan 4/4/18 showed a small pericardial effusion of unclear significance as well as a subcutaneous nodule in the right lateral chest wall.  Subsequent evaluation with echocardiogram 4/17/18 showed no evidence of pericardial effusion.  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  We discussed the possibility of surgical excision of the right subcutaneous chest wall lesion for more definitive diagnosis.  Reviewed previous CT images dating back to 12/8/17 and the lesion was present even at that time measuring around 1.7 cm although not commented on in the radiology report.  As this appears to be an indolent low-grade process unrelated to her breast cancer, recommendee foregoing surgical excision at this time and monitoring this area on future scans.  The patient agreed.    Following 6 cycles of Xeloda, CT 6/6/18  showed stable findings, no evidence of progressive disease.  There was a comment regarding subcutaneous abnormality in the anterior abdominal wall and this was related to Lovenox injection sites.  Bone scan 6/6/18 showed no interval change.   CT scan and bone scan 8/13/18 following 9 cycles of Xeloda showed stable findings with no evidence of  significant visceral metastases.  Her bone lesions appear stable on bone scan.  The spindle cell neoplasm in her right chest wall actually decreased in size from 2 cm down to 1.6 cm.    The patient experienced some symptoms of diarrhea, anorexia, generalized weakness during cycle 9 Xeloda it was unclear whether this was related to a viral gastroenteritis or toxicity from treatment.  Symptoms recurred during cycle 10 and treatment was cut short by 2 days.  Symptoms attributed to Xeloda.  With cycle 11, dose and schedule altered to 1500 mg twice daily for 7 days on followed by 7 days off .  CT scan 9/9/2020 with no significant changes.  Bone scan 9/9/2020 read as unchanged from prior studies however did note an area of slight activity in the medial left femur.  Contacted radiology and although this was not noted on prior reports appears to have been present.  Subsequent MRI left femur 9/21/2020 with cortical thickening and periosteal edema left iliac us muscle insertion to the medial left femur with no evidence of metastatic disease, favored to represent periosteal change secondary to insertional tendinitis.  Severe hand-foot symptoms causing sclerodactyly and limitation in finger movement prompted change in dosing in July 2021 with Xeloda decreased to 1000 mg a.m., 1500 mg p.m. for 7 days on followed by 7 days off.  Patient was experiencing severe hand-foot syndrome related to Xeloda having developed skin peeling, sclerodactyly with limited use of her hands.  On 3/31/2022, Xeloda was held to allow for recovery.  Patient resumed Xeloda on 4/18/2022.  Patient required hospitalization 5/29 - 6/1/2022 with presumed viral gastroenteritis that was exacerbated by Xeloda.  Xeloda held briefly, resumed on 6/6/2022.  Patient again with worsening sclerodactyly, Xeloda held for 2 weeks from 10/3 - 10/17/2022, resumed at prior dose with improvement in symptoms.  Scans on 3/3/2023 with CT chest abdomen pelvis and bone scan showing  "stable findings.  CT chest abdomen pelvis 7/3/2023 with questionable 1 mm change in size right lower lobe nodule.  Additional small pulmonary nodules stable.  Stable bony metastatic disease.  Bone scan on 7/7/2023 however showed slight progression focal involvement right ischium and superior pubic ramus (new ischial lesion superior pubic ramus compared to 10/24/2022 and more conspicuous compared to 3/3/2023).  Metastatic lesion right inferior pubic ramus and ischial tuberosity increased in size since October 2022 however stable from 3/3/2023.  MRI cervical spine 7/3/2023 with no metastatic involvement however identification of the lesion at T2, recommended dedicated thoracic spine MRI to evaluate further.  Given the minimal extent and slow degree of progression, elected to continue oral Xeloda and evaluate further with MRI pelvis and thoracic spine.  Consideration of biopsy pelvic metastasis for repeat ER/AK/HER2/peter testing.    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  CA 15-3 on 7/10/2023 was 12.2, uninformative.  MRI thoracic spine 7/27/2023 with 1 cm metastatic focus seen in L1  MRI pelvis 7/27/2023 with metastatic foci identified right superior pubic ramus, right ischial tuberosity, inferior pubic ramus, L5 body.  Chronic appearing posttraumatic and/or degenerative change in the posterior right ilium adjacent SI joint.  The patient returns today continuing on treatment with Xeloda 1000 mg a.m., 1500 mg p.m. 7 days on followed by 7 days off.  She also continues on monthly Xgeva, a little over 2 weeks overdue today due to recent vacation.  Patient is currently initiating a \"off\" week of Xeloda.  Patient's visit today is slightly delayed due to recent vacation.  She appears to be fairly stable in terms of tolerating Xeloda with -foot symptoms.  She does have some migratory pain affecting her upper extremities, legs, shoulders and occasionally her " neck but no persistent pain in any specific location.  We again today reviewed her most recent bone scan result which showed for the first time evidence of slight progression in her right ischial metastasis in the superior pubic ramus and some more remote slight change in the right inferior pubic ramus compared to prior study from October 2022.  Given the limited degree of progression and ongoing clinical benefit from Xeloda was elected to continue her current treatment.  We did send off [id number] analysis on peripheral blood 7/10/2023 however this was fairly uninformative as noted above.  We discussed that repeat tissue sampling would be beneficial to reassess ER/VA and HER2/peter status of her disease in addition to NGS analysis for potential targeted therapy options.  We discussed difficulties regarding bone tissue in terms of hormonal testing and NGS testing.  Patient is willing to proceed and we will schedule biopsy likely of the progressive right superior pubic ramus lesion via CT guidance within the next week and we will request ER/VA and HER2/pteer analysis as well as [facility] NGS analysis.  We will notify interventional radiology and pathology for appropriate handling of the bone tissue for this testing.  We also discussed pursuit of a 2-month interval CT and bone scan which we will have performed in 2 weeks prior to her return visit here in 3 weeks.  We did discuss that if her disease progresses further and remains ER/VA and HER2/peter negative, we would need to consider options for IV chemotherapy which would involve Mediport placement as well.  The patient had multiple questions which were answered to her satisfaction.    *Right pulmonary embolism:  Diagnosed on CT angiogram 10/21/17 involving small right lower lobe pulmonary artery.  Lower extremity Dopplers negative.  Bilateral lower extremity Dopplers negative again 12/5/17.  Received chronic Lovenox 1 mg/kg twice per day, transition to oral Eliquis in  February 2019, continuing on Eliquis 5 mg twice daily.  Patient continues on anticoagulation without any complications.  Patient is aware of need to hold Eliquis for 48 hours prior to upcoming biopsy and can resume the day after.    *Cancer related pain:  Previously receiving Duragesic 50 æg patch every 72 hours along with Dilaudid 4 mg as needed for breakthrough pain  The patient's pain improved over time and she was able to discontinue both Duragesic and Dilaudid in the interval.  Patient does take occasional Flexeril at bedtime due to back spasm/pain when she has been more active.  The patient does have some occasional aggravation of her chronic back pain and does use tramadol 50 mg every 8 hours as needed  Patient does use tramadol 50 mg every 8-12 hours as needed in addition to hydrocodone 5/325 every 4 hours as needed.  Patient today notes that her pain has been quite bothersome but is migratory in nature.  She does take Advil and Tylenol intermittently in addition to her tramadol.  We again today discussed contraindication of NSAIDs in the setting of her anticoagulation.  She developed nausea from hydrocodone and therefore we will discontinue this and prescribed Percocet 5/325 instead.    *Chemotherapy-induced diarrhea with subsequent C. difficile colitis in the setting of previous ulcerative colitis and then identification of enteropathogenic E. coli:  Patient with reported history of ulcerative colitis, continues on mesalamine.  The patient developed initial diarrhea related to Xeloda at regular dosing.  Symptoms improved on reduced dose Xeloda  Flare of symptoms in October 2018 with apparent finding of C. difficile colitis by GI, treated with course of oral vancomycin with improvement in symptoms.  Patient notes minimal intermittent diarrhea/loose stools on Xeloda requiring occasional dosing of Imodium.    Patient required hospitalization 5/29 - 6/1/2022 with presumed viral gastroenteritis that was  exacerbated by Xeloda.  Xeloda held briefly, resumed on 6/6/2022.  Patient's  developed C. difficile colitis and patient was experiencing increase in diarrhea.  Stool studies performed 8/4/22 negative C. difficile however GI PCR was positive for enteropathogenic E. coli.  Given patient's symptoms and immunocompromise status it was elected to treat her with azithromycin 500 mg daily x3 days beginning 8/5/2022  Diarrhea resolved  Patient underwent colonoscopy on 2/28/2023 with findings of diverticulosis    *Traumatic left tibia/fibular fracture:  Status post ORIF 12/6/17  Specimen was sent for pathologic review, negative for evidence of malignancy    *Hypercalcemia:  Suspect hypercalcemia of malignancy, calcium in  10-11 range previously.  Calcium normalized following initiation of monthly Xgeva on 1/23/18.    *Chemotherapy-induced mucositis:  Patient had a minimal degree of mucositis with cycle 2.  The patient has magic mouthwash to use as needed.  No subsequent mucositis.    *Recurrent UTI, bladder wall thickening on CT:  Patient had an enterococcal UTI on 3/2/18 sensitive to nitrofurantoin and received treatment, unclear how long.  Recurrent UTI 3/20/18 with urine culture growing Klebsiella, initially treated with nitrofurantoin, transitioned to Levaquin.  CT 4/4/18 with diffuse bladder wall thickening with increased nodular thickening at the left base.  Referral to urogynecology Dr. May Johnson.  She was placed on a prophylactic dose of trimethoprim 100 mg daily, bladder wall thickening felt to be related to recent recurrent urinary tract infections.  Patient with urinary symptoms, treated with course of Macrobid at the end of December 2018, urine culture however was negative 12/31/18.  Patient was found to have Klebsiella UTI 7/29/2019 which was successfully treated with Macrobid with complete resolution of symptoms.  CT 8/10/2021 with diffuse bladder wall thickening new from 5/17/2021 (however seen on  multiple prior scans).    UTI on 10/18/2021 with E. coli greater than 100,000 colonies that was pansensitive, treated with Macrobid x7 days  With ongoing/recurrent UTIs patient was seen by urogynecology and initiated suppressive therapy with trimethoprim 100 mg daily in December 2021.  She has not experienced any further urinary tract infections.  CT abdomen pelvis 3/28/2022 does show diffuse thickening of urinary bladder as has been seen on prior studies indicative of cystitis.  Patient notes that she did stop taking trimethoprim on a daily basis.    Patient with urine culture on 5/5/2023 that grew E. coli and she received antibiotic treatment from urogynecology.    Prior to departing for vacation, patient had urinalysis suspicious for UTI on 7/30/2022.  She received a prescription for Augmentin and took part of the prescription prior to her departure but forgot her antibiotic and completed 5 additional doses which she returned last week.  She is having some continued urinary frequency and we will check urinalysis and urine culture today to determine if any additional antibiotic therapy is warranted.    *Mobility:  The patient underwent an intensive course of rehabilitation at [facility].  She graduated from her outpatient course November 2018.  Overall the patient has improved dramatically in terms of mobility,   Patient developed significant callus formation lateral aspect of the left plantar surface.  She did meet with her brace specialist and additional padding was added in this area.  She continues to follow-up with her podiatrist Dr. Ware.    *Depression:  The patient is continuing follow-up in the supportive oncology clinic and is currently continuing on Cymbalta 30 mg twice daily as well as gabapentin 600 mg nightly, temazepam 15 mg nightly as needed, Provigil 100 mg daily.  Patient does continue to attend support groups at [facility].  The patient does continue routine follow-up in supportive oncology  clinic.    Patient does have multiple stressors with her 's malignancy and chemotherapy as well as her autistic son who is living with them at home.  Patient continues to have difficulty with stress and anxiety, is being followed by supportive oncology, due to be seen next on 8/25/2023.    *Hand-foot syndrome secondary to Xeloda:  Patient continues with frequent application of emollient cream to the hands and feet  Symptoms increased significantly requiring a 1 week delay in cycle 18 Xeloda as noted above.  Symptoms did improve and she continued on the same dose.    Patient was referred to dermatology and has been continuing on triamcinolone 0.1% cream used for 1 week on followed by 1 week off which led to some further improvement.   Progressive palmar erythema with development of sclerodactyly and effect on dexterity.  Addition of urea-based cream 3 times daily.  Patient with continued difficulty regarding erythema of her hands that extended onto the dorsal aspect and was causing contractures/sclerodactyly in her fingers affecting her dexterity.  In July 2021, held Xeloda for an additional week and then reduced dose from 1500 mg twice daily down to 1000 mg a.m. and 1500 mg p.m. and continued on a 7-day on followed by 7-day off schedule.  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  On 3/31/2022, Xeloda was held to allow for recovery.  Patient resumed Xeloda on 4/18/2022.  Patient developed worsening sclerodactyly affecting dexterity that required Xeloda to again be briefly held for 2 weeks from 10/3 - 10/17/2022.  Treatment resumed at prior dose after improvement in symptoms.  Patient continues to use emollient cream frequently (currently 5 times daily) and topical  triamcinolone 0.1% twice daily intermittently (2 weeks on followed by 2 weeks off as instructed by dermatology).  Symptoms currently stable with mild erythema of the palms of the hands and decrease and skin peeling.  Minimal erythema currently on the dorsal aspect of the fingers.  Sclerodactyly and effect on dexterity stable.    *Evidence of steatosis on scans with mild intermittent elevated liver function studies:  Liver function studies increased 8/20/19 with ALT 98, AST 70, normal total bilirubin.  Negative viral hepatitis A, B, and C panel 8/23/18  Likely related to hepatic steatosis.   Subsequent improvement in LFTs  LFTs today are normal    *Chemotherapy induced leukopenia:  WBC today 6.56 with normal differential    *GERD, question of celiac disease:  Patient with significant history of reflux  Patient currently receiving Protonix 40 mg daily daily and Carafate 1 g 4 times daily as needed  Patient required hospitalization 5/29 - 6/1/2022 with presumed viral gastroenteritis that was exacerbated by Xeloda.    EGD performed 5/31/2022 during hospitalization with biopsy that showed focal blunting of villi and atrophy with slight increase in intraepithelial lymphocytes.  Changes were minimal but recommended correlation with serology to exclude celiac disease.  Celiac serology 8/8/2022 negative  Patient previously developed worsening reflux symptoms, temporarily increase Protonix to 40 mg twice daily and we did add Carafate 1 g 4 times daily.    She is taking Protonix 40 mg once daily, is not taking Carafate.    *Insomnia:  Patient with prior paradoxical reaction to Benadryl  Improved previously on temazepam 15 mg nightly as needed.   Patient noted subsequently that temazepam was having no effect.   Patient increased gabapentin to 900 mg nightly, discontinued temazepam    *Health maintenance:  Patient notes that she has a history of colon polyps as well as ulcerative colitis and was due for a follow-up colonoscopy  on 9/12/2019.  We did discuss there is no necessity to pursue colonoscopy in the setting of her metastatic breast cancer.    The patient did undergo colonoscopy on 2/7/2020 with findings of muscular hypertrophy and diverticulosis.   Patient did wish to proceed with further colonoscopic evaluation, performed on 12/20/2022 with poor prep.  Repeat 2/28/2023 with diverticulosis.    *Bilateral shoulder pain:  Chronic difficulty with right shoulder although she has not complained of this in prior visits to our office.  She reported difficulty with abduction.    The patient did undergo MRI of her right shoulder on 11/21/2019 at an outside facility showing multiple abnormalities including supraspinatus tendinosis, labral tear but no evidence of metastatic disease.  Patient developed pain in the left shoulder, was seen by orthopedics and underwent steroid injection with some improvement.  Right shoulder stable.  Patient did undergo physical therapy with some improvement.  She continues to use tramadol 50 mg every 8-12 hours as needed.  Note that current CT 10/20/2022 made notation of left shoulder probable bursitis.    Patient was experiencing increased pain in the left shoulder however this has improved to some degree.    *Ocular changes in part related to Xeloda:  Patient experienced a mild degree of blurred vision as well as burning and pruritus.  She was seen by her ophthalmologist and was placed on xiidra ophthalmic drops which have helped.  Likely both issues are to some extent related to Xeloda.  Symptoms did worsen and patient was seen by ophthalmologist at [facility].  She is now using an ocular lubricant at night in addition to artificial tears which have helped.  Symptoms currently stable to improved    *Elevated glucose:  It is noted the patient's glucose at the last few visits has been in the high 100 range, postprandial.  She has had a hemoglobin A1c that has been in the high 5-6 range in the  "past, on 5/1/2019 at 5.7  Hemoglobin A1c was last checked in 11/12/2021 at 5.8    *Possible loosening of pedicle screw at T3:  CT cervical spine 5/17/2021 showed loosening of the left pedicle screw at T3.  Patient referred to orthopedics and was seen by Dr. Nayak who felt that there were no concerning findings on the scan    *Iron deficiency anemia  Labs on 5/2/2022 with hemoglobin 10.8.  Additional labs with iron 48, ferritin 21.2, iron saturation 11%, TIBC 4 and 32, folate greater than 20, B12 greater than 2000.    Attempted treatment with oral iron daily however patient was intolerant  Patient subsequently received Venofer 300 mg weekly x4 beginning 6/6/2022 and completed on 6/27/2022  Subsequent iron studies on 7/11/2022 showed improvement with iron 62, ferritin 345, iron saturation 18%, TIBC 336.  Hemoglobin had improved up to 12.7 at that time.  Repeat iron studies on 10/3/2022 showed iron replete status with hemoglobin 13.7, iron 121, ferritin 208.7, iron saturation 31%, TIBC 392.  Hemoglobin currently normal at 12.4     Plan:  Continue palliative single agent Xeloda 1000 mg a.m., 1500 mg in the p.m. 7 days on followed by 7 days off (patient currently beginning a \"off\" week of treatment today)  Resume monthly Xgeva today (over 2 weeks delayed)  Continue Eliquis 5 mg twice daily  The patient will continue frequent use of emollient cream currently 5 times daily and continue periodic triamcinolone cream 0.1% twice daily (provided by dermatology) 2 weeks on followed by 2 weeks off as prescribed  Continue Protonix 40 mg daily  Continue ocular lubricating gel nightly per ophthalmology  Continue Provigil 100 mg daily and Cymbalta 30 mg twice daily.  Patient continues routine follow-up with supportive oncology   Patient will continue gabapentin 900 mg at night.   Patient continues on tramadol 50 mg every 8 hours as needed for pain  Patient advised to discontinue use of Advil due to ongoing " anticoagulation  Prescription sent for Percocet 5/325 every 4 hours as needed for pain (reports nausea from hydrocodone she received previously)  The patient continues follow-up with her podiatrist and brace specialist regarding callus formation and scaling in the lateral left plantar surface  Urinalysis and culture today.  We will contact patient if further antibiotic therapy is warranted in the setting of her ongoing urinary frequency.  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  Patient will hold Eliquis 48 hours prior to the procedure.  Specimen to be sent for pathologic review including ER/MN and HER2/peter analysis as well as Tempus analysis (we are notifying interventional radiology and pathology for proper handling of specimen for the above testing).  In 2 weeks CT chest abdomen pelvis and bone scan  In 3 weeks MD visit with CBC, CMP.  We will review the above results and discuss whether change in treatment from Xeloda is warranted.     Patient continuing on high risk medication requiring intensive monitoring.       I did spend 65 minutes in total time caring for this patient today, time spent in review of records, face-to-face consultation, coordination of care, placement of orders, completion of documentation.

## 2023-08-23 ENCOUNTER — OFFICE VISIT (OUTPATIENT)
Dept: ONCOLOGY | Facility: CLINIC | Age: 63
End: 2023-08-23
Payer: MEDICARE

## 2023-08-23 ENCOUNTER — INFUSION (OUTPATIENT)
Dept: ONCOLOGY | Facility: HOSPITAL | Age: 63
End: 2023-08-23
Payer: MEDICARE

## 2023-08-23 ENCOUNTER — LAB (OUTPATIENT)
Dept: LAB | Facility: HOSPITAL | Age: 63
End: 2023-08-23
Payer: MEDICARE

## 2023-08-23 VITALS
WEIGHT: 179.5 LBS | OXYGEN SATURATION: 99 % | RESPIRATION RATE: 18 BRPM | TEMPERATURE: 97.8 F | SYSTOLIC BLOOD PRESSURE: 154 MMHG | BODY MASS INDEX: 33.89 KG/M2 | HEIGHT: 61 IN | DIASTOLIC BLOOD PRESSURE: 84 MMHG | HEART RATE: 84 BPM

## 2023-08-23 DIAGNOSIS — C50.811 MALIGNANT NEOPLASM OF OVERLAPPING SITES OF RIGHT BREAST IN FEMALE, ESTROGEN RECEPTOR NEGATIVE: Primary | ICD-10-CM

## 2023-08-23 DIAGNOSIS — Z17.1 MALIGNANT NEOPLASM OF OVERLAPPING SITES OF RIGHT BREAST IN FEMALE, ESTROGEN RECEPTOR NEGATIVE: Primary | ICD-10-CM

## 2023-08-23 DIAGNOSIS — C50.811 MALIGNANT NEOPLASM OF OVERLAPPING SITES OF RIGHT BREAST IN FEMALE, ESTROGEN RECEPTOR NEGATIVE: ICD-10-CM

## 2023-08-23 DIAGNOSIS — Z17.1 MALIGNANT NEOPLASM OF OVERLAPPING SITES OF RIGHT BREAST IN FEMALE, ESTROGEN RECEPTOR NEGATIVE: ICD-10-CM

## 2023-08-23 DIAGNOSIS — R30.0 DYSURIA: ICD-10-CM

## 2023-08-23 DIAGNOSIS — M89.9 LESION OF PELVIC BONE: ICD-10-CM

## 2023-08-23 LAB
ALBUMIN SERPL-MCNC: 4.1 G/DL (ref 3.5–5.2)
ALBUMIN/GLOB SERPL: 1.7 G/DL
ALP SERPL-CCNC: 58 U/L (ref 39–117)
ALT SERPL W P-5'-P-CCNC: 12 U/L (ref 1–33)
ANION GAP SERPL CALCULATED.3IONS-SCNC: 11.4 MMOL/L (ref 5–15)
AST SERPL-CCNC: 27 U/L (ref 1–32)
BACTERIA UR QL AUTO: ABNORMAL /HPF
BASOPHILS # BLD AUTO: 0.04 10*3/MM3 (ref 0–0.2)
BASOPHILS NFR BLD AUTO: 0.6 % (ref 0–1.5)
BILIRUB SERPL-MCNC: 0.4 MG/DL (ref 0–1.2)
BILIRUB UR QL STRIP: NEGATIVE
BUN SERPL-MCNC: 23 MG/DL (ref 8–23)
BUN/CREAT SERPL: 27.7 (ref 7–25)
CALCIUM SPEC-SCNC: 9.4 MG/DL (ref 8.6–10.5)
CHLORIDE SERPL-SCNC: 102 MMOL/L (ref 98–107)
CLARITY UR: CLEAR
CO2 SERPL-SCNC: 29.6 MMOL/L (ref 22–29)
COLOR UR: YELLOW
CREAT SERPL-MCNC: 0.83 MG/DL (ref 0.6–1.1)
DEPRECATED RDW RBC AUTO: 56.7 FL (ref 37–54)
EGFRCR SERPLBLD CKD-EPI 2021: 79.8 ML/MIN/1.73
EOSINOPHIL # BLD AUTO: 0.12 10*3/MM3 (ref 0–0.4)
EOSINOPHIL NFR BLD AUTO: 1.8 % (ref 0.3–6.2)
ERYTHROCYTE [DISTWIDTH] IN BLOOD BY AUTOMATED COUNT: 15.3 % (ref 12.3–15.4)
GLOBULIN UR ELPH-MCNC: 2.4 GM/DL
GLUCOSE SERPL-MCNC: 144 MG/DL (ref 65–99)
GLUCOSE UR STRIP-MCNC: NEGATIVE MG/DL
HCT VFR BLD AUTO: 37.8 % (ref 34–46.6)
HGB BLD-MCNC: 12.4 G/DL (ref 12–15.9)
HGB UR QL STRIP.AUTO: ABNORMAL
HYALINE CASTS UR QL AUTO: ABNORMAL /LPF
IMM GRANULOCYTES # BLD AUTO: 0.02 10*3/MM3 (ref 0–0.05)
IMM GRANULOCYTES NFR BLD AUTO: 0.3 % (ref 0–0.5)
KETONES UR QL STRIP: NEGATIVE
LEUKOCYTE ESTERASE UR QL STRIP.AUTO: ABNORMAL
LYMPHOCYTES # BLD AUTO: 1.19 10*3/MM3 (ref 0.7–3.1)
LYMPHOCYTES NFR BLD AUTO: 18.1 % (ref 19.6–45.3)
MAGNESIUM SERPL-MCNC: 1.9 MG/DL (ref 1.6–2.4)
MCH RBC QN AUTO: 33.4 PG (ref 26.6–33)
MCHC RBC AUTO-ENTMCNC: 32.8 G/DL (ref 31.5–35.7)
MCV RBC AUTO: 101.9 FL (ref 79–97)
MONOCYTES # BLD AUTO: 0.37 10*3/MM3 (ref 0.1–0.9)
MONOCYTES NFR BLD AUTO: 5.6 % (ref 5–12)
NEUTROPHILS NFR BLD AUTO: 4.82 10*3/MM3 (ref 1.7–7)
NEUTROPHILS NFR BLD AUTO: 73.6 % (ref 42.7–76)
NITRITE UR QL STRIP: POSITIVE
NRBC BLD AUTO-RTO: 0 /100 WBC (ref 0–0.2)
PH UR STRIP.AUTO: 7 [PH] (ref 4.5–8)
PHOSPHATE SERPL-MCNC: 3.1 MG/DL (ref 2.5–4.5)
PLATELET # BLD AUTO: 257 10*3/MM3 (ref 140–450)
PMV BLD AUTO: 10.3 FL (ref 6–12)
POTASSIUM SERPL-SCNC: 5 MMOL/L (ref 3.5–5.2)
PROT SERPL-MCNC: 6.5 G/DL (ref 6–8.5)
PROT UR QL STRIP: ABNORMAL
RBC # BLD AUTO: 3.71 10*6/MM3 (ref 3.77–5.28)
RBC # UR STRIP: ABNORMAL /HPF
REF LAB TEST METHOD: ABNORMAL
SODIUM SERPL-SCNC: 143 MMOL/L (ref 136–145)
SP GR UR STRIP: 1.01 (ref 1–1.03)
SQUAMOUS #/AREA URNS HPF: ABNORMAL /HPF
UROBILINOGEN UR QL STRIP: ABNORMAL
WBC # UR STRIP: ABNORMAL /HPF
WBC NRBC COR # BLD: 6.56 10*3/MM3 (ref 3.4–10.8)

## 2023-08-23 PROCEDURE — 85025 COMPLETE CBC W/AUTO DIFF WBC: CPT

## 2023-08-23 PROCEDURE — 87077 CULTURE AEROBIC IDENTIFY: CPT | Performed by: INTERNAL MEDICINE

## 2023-08-23 PROCEDURE — 80053 COMPREHEN METABOLIC PANEL: CPT

## 2023-08-23 PROCEDURE — 36415 COLL VENOUS BLD VENIPUNCTURE: CPT

## 2023-08-23 PROCEDURE — 87086 URINE CULTURE/COLONY COUNT: CPT | Performed by: INTERNAL MEDICINE

## 2023-08-23 PROCEDURE — 81001 URINALYSIS AUTO W/SCOPE: CPT | Performed by: INTERNAL MEDICINE

## 2023-08-23 PROCEDURE — 84100 ASSAY OF PHOSPHORUS: CPT

## 2023-08-23 PROCEDURE — 25010000002 DENOSUMAB 120 MG/1.7ML SOLUTION: Performed by: INTERNAL MEDICINE

## 2023-08-23 PROCEDURE — 87186 SC STD MICRODIL/AGAR DIL: CPT | Performed by: INTERNAL MEDICINE

## 2023-08-23 PROCEDURE — 96372 THER/PROPH/DIAG INJ SC/IM: CPT

## 2023-08-23 PROCEDURE — 83735 ASSAY OF MAGNESIUM: CPT

## 2023-08-23 RX ORDER — OXYCODONE HYDROCHLORIDE AND ACETAMINOPHEN 5; 325 MG/1; MG/1
1 TABLET ORAL EVERY 4 HOURS PRN
Qty: 60 TABLET | Refills: 0 | Status: SHIPPED | OUTPATIENT
Start: 2023-08-23

## 2023-08-23 RX ADMIN — DENOSUMAB 120 MG: 120 INJECTION SUBCUTANEOUS at 16:45

## 2023-08-23 NOTE — LETTER
"August 23, 2023     Jazmine Chanel MD  [address] [id number]    Patient: Martha Davey   YOB: 1960   Date of Visit: 8/23/2023     Dear Jazmine Chanel MD:       Thank you for referring Martha Davey to me for evaluation. Below are the relevant portions of my assessment and plan of care.    If you have questions, please do not hesitate to call me. I look forward to following Martha along with you.         Sincerely,        Ubaldo Hancock MD        CC: She Brunner, LISA Hancock, Ubaldo YODER Jr., MD  08/23/23 [id number]  Sign when Signing Visit  Chief Complaint  Previous Stage Ib ([id number]) ER/WY positive, HER-2/peter negative right breast cancer with subsequent metastatic disease identified 10/8/2017, history of right pulmonary embolism, cancer related pain, chemotherapy-induced diarrhea, chemotherapy-induced mucositis, chemotherapy-induced hand-foot syndrome    Subjective        History of Present Illness  The patient returns today in delayed follow-up continuing on oral Xeloda 1000 mg in the morning, 1500 mg in the evening for 7 days on followed by 7 days off as well as monthly Xgeva and Eliquis 5 mg twice daily.  The patient is currently beginning a \"off\" week of Xeloda today.  She was due a little over 2 weeks ago for Xgeva and was supposed to have been seen at that time to review her MRI thoracic spine and pelvis from 7/27/2023.  Patient however went on a vacation and returned just recently, rescheduled her appointment.  Prior to leaving for vacation she was seen in an urgent care for dysuria and urinary frequency with urinalysis that suggested UTI and was given a course of Augmentin.  She took some of the Augmentin prior to her departure and forgot the medication, took the remaining 5 pills when she returned last week.  She is still having some urinary frequency at this time.  In the interim the patient notes that she has had considerable difficulty with family issues and " "is very upset about this, her brother has been giving her difficulties regarding care of their mother.  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  She does not have any persistent location of pain.  She does take tramadol however notes that she takes periodic Advil and Tylenol, is aware that Advil is contraindicated with ongoing anticoagulation.  We did previously prescribe hydrocodone 5/325 however she reports that this does produce nausea and she does not like to take narcotics.  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  She does have some chronic fatigue which is stable.      Objective   Vital Signs:   /84   Pulse 84   Temp 97.8 øF (36.6 øC) (Temporal)   Resp 18   Ht 154.9 cm (60.98\")   Wt 81.4 kg (179 lb 8 oz)   SpO2 99%   BMI 33.93 kg/mý     Physical Exam  Constitutional:       Appearance: She is well-developed.      Comments: Patient ambulates with the use of a cane   Eyes:      Conjunctiva/sclera: Conjunctivae normal.   Neck:      Thyroid: No thyromegaly.   Cardiovascular:      Rate and Rhythm: Normal rate and regular rhythm.      Heart sounds: No murmur heard.    No friction rub. No gallop.   Pulmonary:      Effort: No respiratory distress.      Breath sounds: Normal breath sounds.   Abdominal:      General: Bowel sounds are normal. There is no distension.      Palpations: Abdomen is soft.      Tenderness: There is no abdominal tenderness.   Musculoskeletal:      Comments: Braces in place in the bilateral lower extremities   Lymphadenopathy:      Head:      Right side of head: No submandibular adenopathy.      Cervical: No cervical adenopathy.      Upper Body:      Right upper body: No supraclavicular adenopathy.      Left upper body: No supraclavicular adenopathy.   Skin:     General: Skin is warm and dry.      Findings: Rash " present.      Comments: Erythema involving the palms is currently minimal.  The degree of skin peeling is currently minimal.  There is minimal erythema involving the dorsal aspect of the fingers.  Sclerodactyly does persist and appears stable.    Neurological:      Mental Status: She is alert and oriented to person, place, and time.      Cranial Nerves: No cranial nerve deficit.      Motor: No abnormal muscle tone.      Deep Tendon Reflexes: Reflexes normal.   Psychiatric:         Behavior: Behavior normal.       Result Review : Reviewed CBC, CMP, magnesium, phosphorus from today.  Reviewed CA 15-3 and [id number] results from 7/10/2023.  Reviewed MRI thoracic spine and pelvis from 7/27/2023.     Assessment and Plan     *Previous Stage Ib ([id number]) right breast cancer:  Diagnosed May 2010 with excisional biopsy for invasive ductal carcinoma, 1.3 cm, grade 2, ER 90%, TX 80%, HER-2/peter negative (1+ IHC).    Subsequent right mastectomy in July 2010 with no residual breast malignancy, 1/5 sentinel lymph node with micrometastasis (0.25 mm).    Treated in the [facility] system with adjuvant AC x4 cycles in 2010 (no taxanes administered due to underlying Charcot-Saloni-Tooth with peripheral neuropathy).    Adjuvant Femara (postmenopausal) initiated October 2010 with plan to treat x10 years.    Genetic testing reportedly negative.    Developed osteopenia treated with Prolia beginning 2/27/13. Subsequently discontinued due to identification of metastatic disease.    *Recurrent/metastatic disease identified 10/8/17:  Disease involving thoracic spine with cord compression at T6, lumbosacral involvement, sternal and right sternoclavicular involvement.    Femara discontinued in 10/2017.    Radiation administered (in the [facility] system) to the thoracic spine beginning 10/19/17 treating T3-T9 to a dose of 24 gray in 6 fractions.  Evaluation with MRI 12/8/17 showing persistent T6 cord compression with persistent neurologic compromise  requiring surgical treatment 12/11/17 with T6 laminectomy/corpectomy and T3-T9 fusion.  Pathology with metastatic carcinoma of breast origin, ER negative, AK negative, HER-2/peter negative (1+ IHC).  Additional staging evaluation 12/8/17 with no evidence of visceral metastatic disease, bone scan showing involvement of thoracic spine, sternum, left humerus, mid frontal bone.  No plane film correlate of left humerus lesion.  MRI lumbar spine with small intradural L3 metastasis.  CA 15-3 12/6/17- 17.  Palliative radiation therapy to L3 dural metastasis and left humerus initiated 1/15/18 (10 fractions), completed 1/26/18.  Hypercalcemia of malignancy with calcium in the 10-11 range.  Initiation of monthly Xgeva 1/23/18.  Baseline CT scan 1/30/18 with no evidence of visceral involvement.  Cluster of nodular opacities in the right lower lobe suspected to be infectious or related to bronchiolitis. Bone scan 1/30/18 showed postsurgical change in the thoracic spine, stable uptake in the frontal bone, no new areas of disease.  Initiation of palliative oral single agent Xeloda 2/7/18 2000 mg a.m., 1500 mg p.m. for 14/21 days.   Following 3 cycles xeloda, bone scan 4/4/18 showed no change from the prior study.  CT scan 4/4/18 showed a small pericardial effusion of unclear significance as well as a subcutaneous nodule in the right lateral chest wall.  Subsequent evaluation with echocardiogram 4/17/18 showed no evidence of pericardial effusion.  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  We discussed the possibility of surgical excision of the right subcutaneous chest wall lesion for more definitive diagnosis.  Reviewed previous CT images dating back to 12/8/17 and the lesion was present even at that time measuring around 1.7 cm although not commented on in the radiology report.  As this  appears to be an indolent low-grade process unrelated to her breast cancer, recommendee foregoing surgical excision at this time and monitoring this area on future scans.  The patient agreed.    Following 6 cycles of Xeloda, CT 6/6/18  showed stable findings, no evidence of progressive disease.  There was a comment regarding subcutaneous abnormality in the anterior abdominal wall and this was related to Lovenox injection sites.  Bone scan 6/6/18 showed no interval change.   CT scan and bone scan 8/13/18 following 9 cycles of Xeloda showed stable findings with no evidence of significant visceral metastases.  Her bone lesions appear stable on bone scan.  The spindle cell neoplasm in her right chest wall actually decreased in size from 2 cm down to 1.6 cm.    The patient experienced some symptoms of diarrhea, anorexia, generalized weakness during cycle 9 Xeloda it was unclear whether this was related to a viral gastroenteritis or toxicity from treatment.  Symptoms recurred during cycle 10 and treatment was cut short by 2 days.  Symptoms attributed to Xeloda.  With cycle 11, dose and schedule altered to 1500 mg twice daily for 7 days on followed by 7 days off .  CT scan 9/9/2020 with no significant changes.  Bone scan 9/9/2020 read as unchanged from prior studies however did note an area of slight activity in the medial left femur.  Contacted radiology and although this was not noted on prior reports appears to have been present.  Subsequent MRI left femur 9/21/2020 with cortical thickening and periosteal edema left iliac us muscle insertion to the medial left femur with no evidence of metastatic disease, favored to represent periosteal change secondary to insertional tendinitis.  Severe hand-foot symptoms causing sclerodactyly and limitation in finger movement prompted change in dosing in July 2021 with Xeloda decreased to 1000 mg a.m., 1500 mg p.m. for 7 days on followed by 7 days off.  Patient was experiencing severe  hand-foot syndrome related to Xeloda having developed skin peeling, sclerodactyly with limited use of her hands.  On 3/31/2022, Xeloda was held to allow for recovery.  Patient resumed Xeloda on 4/18/2022.  Patient required hospitalization 5/29 - 6/1/2022 with presumed viral gastroenteritis that was exacerbated by Xeloda.  Xeloda held briefly, resumed on 6/6/2022.  Patient again with worsening sclerodactyly, Xeloda held for 2 weeks from 10/3 - 10/17/2022, resumed at prior dose with improvement in symptoms.  Scans on 3/3/2023 with CT chest abdomen pelvis and bone scan showing stable findings.  CT chest abdomen pelvis 7/3/2023 with questionable 1 mm change in size right lower lobe nodule.  Additional small pulmonary nodules stable.  Stable bony metastatic disease.  Bone scan on 7/7/2023 however showed slight progression focal involvement right ischium and superior pubic ramus (new ischial lesion superior pubic ramus compared to 10/24/2022 and more conspicuous compared to 3/3/2023).  Metastatic lesion right inferior pubic ramus and ischial tuberosity increased in size since October 2022 however stable from 3/3/2023.  MRI cervical spine 7/3/2023 with no metastatic involvement however identification of the lesion at T2, recommended dedicated thoracic spine MRI to evaluate further.  Given the minimal extent and slow degree of progression, elected to continue oral Xeloda and evaluate further with MRI pelvis and thoracic spine.  Consideration of biopsy pelvic metastasis for repeat ER/MO/HER2/peter testing.    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  CA 15-3 on 7/10/2023 was 12.2, uninformative.  MRI thoracic spine 7/27/2023 with 1 cm metastatic focus seen in L1  MRI pelvis 7/27/2023 with metastatic foci identified right superior pubic ramus, right ischial tuberosity, inferior pubic ramus, L5 body.  Chronic appearing posttraumatic and/or degenerative change in the  "posterior right ilium adjacent SI joint.  The patient returns today continuing on treatment with Xeloda 1000 mg a.m., 1500 mg p.m. 7 days on followed by 7 days off.  She also continues on monthly Xgeva, a little over 2 weeks overdue today due to recent vacation.  Patient is currently initiating a \"off\" week of Xeloda.  Patient's visit today is slightly delayed due to recent vacation.  She appears to be fairly stable in terms of tolerating Xeloda with -foot symptoms.  She does have some migratory pain affecting her upper extremities, legs, shoulders and occasionally her neck but no persistent pain in any specific location.  We again today reviewed her most recent bone scan result which showed for the first time evidence of slight progression in her right ischial metastasis in the superior pubic ramus and some more remote slight change in the right inferior pubic ramus compared to prior study from October 2022.  Given the limited degree of progression and ongoing clinical benefit from Xeloda was elected to continue her current treatment.  We did send off [id number] analysis on peripheral blood 7/10/2023 however this was fairly uninformative as noted above.  We discussed that repeat tissue sampling would be beneficial to reassess ER/MT and HER2/peter status of her disease in addition to NGS analysis for potential targeted therapy options.  We discussed difficulties regarding bone tissue in terms of hormonal testing and NGS testing.  Patient is willing to proceed and we will schedule biopsy likely of the progressive right superior pubic ramus lesion via CT guidance within the next week and we will request ER/MT and HER2/peter analysis as well as [facility] NGS analysis.  We will notify interventional radiology and pathology for appropriate handling of the bone tissue for this testing.  We also discussed pursuit of a 2-month interval CT and bone scan which we will have performed in 2 weeks prior to her return visit " here in 3 weeks.  We did discuss that if her disease progresses further and remains ER/WY and HER2/peter negative, we would need to consider options for IV chemotherapy which would involve Mediport placement as well.  The patient had multiple questions which were answered to her satisfaction.    *Right pulmonary embolism:  Diagnosed on CT angiogram 10/21/17 involving small right lower lobe pulmonary artery.  Lower extremity Dopplers negative.  Bilateral lower extremity Dopplers negative again 12/5/17.  Received chronic Lovenox 1 mg/kg twice per day, transition to oral Eliquis in February 2019, continuing on Eliquis 5 mg twice daily.  Patient continues on anticoagulation without any complications.  Patient is aware of need to hold Eliquis for 48 hours prior to upcoming biopsy and can resume the day after.    *Cancer related pain:  Previously receiving Duragesic 50 æg patch every 72 hours along with Dilaudid 4 mg as needed for breakthrough pain  The patient's pain improved over time and she was able to discontinue both Duragesic and Dilaudid in the interval.  Patient does take occasional Flexeril at bedtime due to back spasm/pain when she has been more active.  The patient does have some occasional aggravation of her chronic back pain and does use tramadol 50 mg every 8 hours as needed  Patient does use tramadol 50 mg every 8-12 hours as needed in addition to hydrocodone 5/325 every 4 hours as needed.  Patient today notes that her pain has been quite bothersome but is migratory in nature.  She does take Advil and Tylenol intermittently in addition to her tramadol.  We again today discussed contraindication of NSAIDs in the setting of her anticoagulation.  She developed nausea from hydrocodone and therefore we will discontinue this and prescribed Percocet 5/325 instead.    *Chemotherapy-induced diarrhea with subsequent C. difficile colitis in the setting of previous ulcerative colitis and then identification of  enteropathogenic E. coli:  Patient with reported history of ulcerative colitis, continues on mesalamine.  The patient developed initial diarrhea related to Xeloda at regular dosing.  Symptoms improved on reduced dose Xeloda  Flare of symptoms in October 2018 with apparent finding of C. difficile colitis by GI, treated with course of oral vancomycin with improvement in symptoms.  Patient notes minimal intermittent diarrhea/loose stools on Xeloda requiring occasional dosing of Imodium.    Patient required hospitalization 5/29 - 6/1/2022 with presumed viral gastroenteritis that was exacerbated by Xeloda.  Xeloda held briefly, resumed on 6/6/2022.  Patient's  developed C. difficile colitis and patient was experiencing increase in diarrhea.  Stool studies performed 8/4/22 negative C. difficile however GI PCR was positive for enteropathogenic E. coli.  Given patient's symptoms and immunocompromise status it was elected to treat her with azithromycin 500 mg daily x3 days beginning 8/5/2022  Diarrhea resolved  Patient underwent colonoscopy on 2/28/2023 with findings of diverticulosis    *Traumatic left tibia/fibular fracture:  Status post ORIF 12/6/17  Specimen was sent for pathologic review, negative for evidence of malignancy    *Hypercalcemia:  Suspect hypercalcemia of malignancy, calcium in  10-11 range previously.  Calcium normalized following initiation of monthly Xgeva on 1/23/18.    *Chemotherapy-induced mucositis:  Patient had a minimal degree of mucositis with cycle 2.  The patient has magic mouthwash to use as needed.  No subsequent mucositis.    *Recurrent UTI, bladder wall thickening on CT:  Patient had an enterococcal UTI on 3/2/18 sensitive to nitrofurantoin and received treatment, unclear how long.  Recurrent UTI 3/20/18 with urine culture growing Klebsiella, initially treated with nitrofurantoin, transitioned to Levaquin.  CT 4/4/18 with diffuse bladder wall thickening with increased nodular  thickening at the left base.  Referral to urogynecology Dr. May Johnson.  She was placed on a prophylactic dose of trimethoprim 100 mg daily, bladder wall thickening felt to be related to recent recurrent urinary tract infections.  Patient with urinary symptoms, treated with course of Macrobid at the end of December 2018, urine culture however was negative 12/31/18.  Patient was found to have Klebsiella UTI 7/29/2019 which was successfully treated with Macrobid with complete resolution of symptoms.  CT 8/10/2021 with diffuse bladder wall thickening new from 5/17/2021 (however seen on multiple prior scans).    UTI on 10/18/2021 with E. coli greater than 100,000 colonies that was pansensitive, treated with Macrobid x7 days  With ongoing/recurrent UTIs patient was seen by urogynecology and initiated suppressive therapy with trimethoprim 100 mg daily in December 2021.  She has not experienced any further urinary tract infections.  CT abdomen pelvis 3/28/2022 does show diffuse thickening of urinary bladder as has been seen on prior studies indicative of cystitis.  Patient notes that she did stop taking trimethoprim on a daily basis.    Patient with urine culture on 5/5/2023 that grew E. coli and she received antibiotic treatment from urogynecology.    Prior to departing for vacation, patient had urinalysis suspicious for UTI on 7/30/2022.  She received a prescription for Augmentin and took part of the prescription prior to her departure but forgot her antibiotic and completed 5 additional doses which she returned last week.  She is having some continued urinary frequency and we will check urinalysis and urine culture today to determine if any additional antibiotic therapy is warranted.    *Mobility:  The patient underwent an intensive course of rehabilitation at [facility].  She graduated from her outpatient course November 2018.  Overall the patient has improved dramatically in terms of mobility,   Patient developed  significant callus formation lateral aspect of the left plantar surface.  She did meet with her brace specialist and additional padding was added in this area.  She continues to follow-up with her podiatrist Dr. Ware.    *Depression:  The patient is continuing follow-up in the supportive oncology clinic and is currently continuing on Cymbalta 30 mg twice daily as well as gabapentin 600 mg nightly, temazepam 15 mg nightly as needed, Provigil 100 mg daily.  Patient does continue to attend support groups at [facility].  The patient does continue routine follow-up in supportive oncology clinic.    Patient does have multiple stressors with her 's malignancy and chemotherapy as well as her autistic son who is living with them at home.  Patient continues to have difficulty with stress and anxiety, is being followed by supportive oncology, due to be seen next on 8/25/2023.    *Hand-foot syndrome secondary to Xeloda:  Patient continues with frequent application of emollient cream to the hands and feet  Symptoms increased significantly requiring a 1 week delay in cycle 18 Xeloda as noted above.  Symptoms did improve and she continued on the same dose.    Patient was referred to dermatology and has been continuing on triamcinolone 0.1% cream used for 1 week on followed by 1 week off which led to some further improvement.   Progressive palmar erythema with development of sclerodactyly and effect on dexterity.  Addition of urea-based cream 3 times daily.  Patient with continued difficulty regarding erythema of her hands that extended onto the dorsal aspect and was causing contractures/sclerodactyly in her fingers affecting her dexterity.  In July 2021, held Xeloda for an additional week and then reduced dose from 1500 mg twice daily down to 1000 mg a.m. and 1500 mg p.m. and continued on a 7-day on followed by 7-day off schedule.  With reduction in Xeloda dose, slight improvement in erythema involving dorsal aspect of  the hands and slight decrease in sclerodactyly  Patient was experiencing severe hand-foot syndrome related to Xeloda having developed skin peeling, sclerodactyly with limited use of her hands.  On 3/31/2022, Xeloda was held to allow for recovery.  Patient resumed Xeloda on 4/18/2022.  Patient developed worsening sclerodactyly affecting dexterity that required Xeloda to again be briefly held for 2 weeks from 10/3 - 10/17/2022.  Treatment resumed at prior dose after improvement in symptoms.  Patient continues to use emollient cream frequently (currently 5 times daily) and topical triamcinolone 0.1% twice daily intermittently (2 weeks on followed by 2 weeks off as instructed by dermatology).  Symptoms currently stable with mild erythema of the palms of the hands and decrease and skin peeling.  Minimal erythema currently on the dorsal aspect of the fingers.  Sclerodactyly and effect on dexterity stable.    *Evidence of steatosis on scans with mild intermittent elevated liver function studies:  Liver function studies increased 8/20/19 with ALT 98, AST 70, normal total bilirubin.  Negative viral hepatitis A, B, and C panel 8/23/18  Likely related to hepatic steatosis.   Subsequent improvement in LFTs  LFTs today are normal    *Chemotherapy induced leukopenia:  WBC today 6.56 with normal differential    *GERD, question of celiac disease:  Patient with significant history of reflux  Patient currently receiving Protonix 40 mg daily daily and Carafate 1 g 4 times daily as needed  Patient required hospitalization 5/29 - 6/1/2022 with presumed viral gastroenteritis that was exacerbated by Xeloda.    EGD performed 5/31/2022 during hospitalization with biopsy that showed focal blunting of villi and atrophy with slight increase in intraepithelial lymphocytes.  Changes were minimal but recommended correlation with serology to exclude celiac disease.  Celiac serology 8/8/2022 negative  Patient previously developed worsening reflux  symptoms, temporarily increase Protonix to 40 mg twice daily and we did add Carafate 1 g 4 times daily.    She is taking Protonix 40 mg once daily, is not taking Carafate.    *Insomnia:  Patient with prior paradoxical reaction to Benadryl  Improved previously on temazepam 15 mg nightly as needed.   Patient noted subsequently that temazepam was having no effect.   Patient increased gabapentin to 900 mg nightly, discontinued temazepam    *Health maintenance:  Patient notes that she has a history of colon polyps as well as ulcerative colitis and was due for a follow-up colonoscopy on 9/12/2019.  We did discuss there is no necessity to pursue colonoscopy in the setting of her metastatic breast cancer.    The patient did undergo colonoscopy on 2/7/2020 with findings of muscular hypertrophy and diverticulosis.   Patient did wish to proceed with further colonoscopic evaluation, performed on 12/20/2022 with poor prep.  Repeat 2/28/2023 with diverticulosis.    *Bilateral shoulder pain:  Chronic difficulty with right shoulder although she has not complained of this in prior visits to our office.  She reported difficulty with abduction.    The patient did undergo MRI of her right shoulder on 11/21/2019 at an outside facility showing multiple abnormalities including supraspinatus tendinosis, labral tear but no evidence of metastatic disease.  Patient developed pain in the left shoulder, was seen by orthopedics and underwent steroid injection with some improvement.  Right shoulder stable.  Patient did undergo physical therapy with some improvement.  She continues to use tramadol 50 mg every 8-12 hours as needed.  Note that current CT 10/20/2022 made notation of left shoulder probable bursitis.    Patient was experiencing increased pain in the left shoulder however this has improved to some degree.    *Ocular changes in part related to Xeloda:  Patient experienced a mild degree of blurred vision as well as burning and  "pruritus.  She was seen by her ophthalmologist and was placed on xiidra ophthalmic drops which have helped.  Likely both issues are to some extent related to Xeloda.  Symptoms did worsen and patient was seen by ophthalmologist at [facility].  She is now using an ocular lubricant at night in addition to artificial tears which have helped.  Symptoms currently stable to improved    *Elevated glucose:  It is noted the patient's glucose at the last few visits has been in the high 100 range, postprandial.  She has had a hemoglobin A1c that has been in the high 5-6 range in the past, on 5/1/2019 at 5.7  Hemoglobin A1c was last checked in 11/12/2021 at 5.8    *Possible loosening of pedicle screw at T3:  CT cervical spine 5/17/2021 showed loosening of the left pedicle screw at T3.  Patient referred to orthopedics and was seen by Dr. Nayak who felt that there were no concerning findings on the scan    *Iron deficiency anemia  Labs on 5/2/2022 with hemoglobin 10.8.  Additional labs with iron 48, ferritin 21.2, iron saturation 11%, TIBC 4 and 32, folate greater than 20, B12 greater than 2000.    Attempted treatment with oral iron daily however patient was intolerant  Patient subsequently received Venofer 300 mg weekly x4 beginning 6/6/2022 and completed on 6/27/2022  Subsequent iron studies on 7/11/2022 showed improvement with iron 62, ferritin 345, iron saturation 18%, TIBC 336.  Hemoglobin had improved up to 12.7 at that time.  Repeat iron studies on 10/3/2022 showed iron replete status with hemoglobin 13.7, iron 121, ferritin 208.7, iron saturation 31%, TIBC 392.  Hemoglobin currently normal at 12.4     Plan:  Continue palliative single agent Xeloda 1000 mg a.m., 1500 mg in the p.m. 7 days on followed by 7 days off (patient currently beginning a \"off\" week of treatment today)  Resume monthly Xgeva today (over 2 weeks delayed)  Continue Eliquis 5 mg twice daily  The patient will continue frequent use of " emollient cream currently 5 times daily and continue periodic triamcinolone cream 0.1% twice daily (provided by dermatology) 2 weeks on followed by 2 weeks off as prescribed  Continue Protonix 40 mg daily  Continue ocular lubricating gel nightly per ophthalmology  Continue Provigil 100 mg daily and Cymbalta 30 mg twice daily.  Patient continues routine follow-up with supportive oncology   Patient will continue gabapentin 900 mg at night.   Patient continues on tramadol 50 mg every 8 hours as needed for pain  Patient advised to discontinue use of Advil due to ongoing anticoagulation  Prescription sent for Percocet 5/325 every 4 hours as needed for pain (reports nausea from hydrocodone she received previously)  The patient continues follow-up with her podiatrist and brace specialist regarding callus formation and scaling in the lateral left plantar surface  Urinalysis and culture today.  We will contact patient if further antibiotic therapy is warranted in the setting of her ongoing urinary frequency.  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  Patient will hold Eliquis 48 hours prior to the procedure.  Specimen to be sent for pathologic review including ER/MI and HER2/peter analysis as well as Tempus analysis (we are notifying interventional radiology and pathology for proper handling of specimen for the above testing).  In 2 weeks CT chest abdomen pelvis and bone scan  In 3 weeks MD visit with CBC, CMP.  We will review the above results and discuss whether change in treatment from Xeloda is warranted.     Patient continuing on high risk medication requiring intensive monitoring.       I did spend 65 minutes in total time caring for this patient today, time spent in review of records, face-to-face consultation, coordination of care, placement of orders, completion of documentation.

## 2023-08-24 ENCOUNTER — TELEPHONE (OUTPATIENT)
Dept: ONCOLOGY | Facility: CLINIC | Age: 63
End: 2023-08-24

## 2023-08-24 RX ORDER — CIPROFLOXACIN 500 MG/1
500 TABLET, FILM COATED ORAL 2 TIMES DAILY
Qty: 10 TABLET | Refills: 0 | Status: SHIPPED | OUTPATIENT
Start: 2023-08-24

## 2023-08-24 NOTE — TELEPHONE ENCOUNTER
Called the patient back to let her know that I talked with Dr. Hancock and he wanted Cipro sent in for 500mg BID x 5 days and I sent it in to [facility] on [address] and she v/u.

## 2023-08-24 NOTE — TELEPHONE ENCOUNTER
Patient called in asking about an abx for her UTI like symptoms. Seen that urine was dropped off yesterday and I let her know that I would see what Dr. Hancock wanted sent in. Patient v/u.

## 2023-08-24 NOTE — TELEPHONE ENCOUNTER
Provider: Dr Hancock  Caller: Martha Davey  Relationship to Patient: Self  Call Back Phone Number: [phone]  Reason for Call: Pt has cloudiness in urine, thinks it's a UTI, can Dr Hancock call in an Rx? Pt is having scans wants to know if all can be done together, or at least 2 of the 3

## 2023-08-25 ENCOUNTER — OFFICE VISIT (OUTPATIENT)
Dept: PSYCHIATRY | Facility: HOSPITAL | Age: 63
End: 2023-08-25
Payer: MEDICARE

## 2023-08-25 DIAGNOSIS — R53.0 NEOPLASTIC MALIGNANT RELATED FATIGUE: Primary | ICD-10-CM

## 2023-08-25 DIAGNOSIS — F33.1 MAJOR DEPRESSIVE DISORDER, RECURRENT EPISODE, MODERATE: ICD-10-CM

## 2023-08-25 DIAGNOSIS — G47.33 OSA (OBSTRUCTIVE SLEEP APNEA): ICD-10-CM

## 2023-08-25 LAB — BACTERIA SPEC AEROBE CULT: ABNORMAL

## 2023-08-25 NOTE — PROGRESS NOTES
[facility]  In Person Session    Subjective  Patient ID: Martha Davey is a 62 y.o. female is seen face to face in the [facility] ([facility].    CC: Anxiety, depression    HPI/ Interval History:   Pt is seen alongside  with shared permission.  Pt continues to endorse significant fatigue, physically and emotionally, alongside further progression of metastatic disease, continued complications in family dynamics. Continues to endorse the demoralization associated with mgmt of cancer as chronic illness, greater than 5 year survival, although with prolonged emotional impact of challenging diagnosis.  Family dynamics persist. Currently bearing challenges of false accusations, feelings of division. Acknowledges grief and loss. Reports significant enjoyment in recent travel with friends.  Continues to have some pain, not yet initiating percocet. Reports persistent fatigue, responsive to modafinil 100-200 mg daily. Continues to deny adherence to CPAP. Other medications reviewed with report of stable depression and anxiety.    Gabapentin 300 mg q dinner, 600 mg q hs, cymbalta 60 mg daily, temazepam 15 mg q hs, modafinil 100-200 mg q AM.    Exam: As above    Recent Labs Reviewed:  Office Visit on 08/23/2023   Component Date Value    Urine Culture 08/23/2023 >100,000 CFU/mL Escherichia coli (A)     Color, UA 08/23/2023 Yellow     Appearance, UA 08/23/2023 Clear     pH, UA 08/23/2023 7.0     Specific Gravity, UA 08/23/2023 1.015     Glucose, UA 08/23/2023 Negative     Ketones, UA 08/23/2023 Negative     Bilirubin, UA 08/23/2023 Negative     Blood, UA 08/23/2023 Moderate (2+) (A)     Protein, UA 08/23/2023 30 mg/dL (1+) (A)     Leuk Esterase, UA 08/23/2023 Large (3+) (A)     Nitrite, UA 08/23/2023 Positive (A)     Urobilinogen, UA 08/23/2023 0.2 E.U./dL     RBC, UA 08/23/2023 21-30 (A)     WBC, UA 08/23/2023 21-30 (A)     Bacteria, UA 08/23/2023 3+ (A)     Squamous Epithelial Cell*  08/23/2023 13-20 (A)     Hyaline Casts, UA 08/23/2023 None Seen     Methodology 08/23/2023 Manual Light Microscopy    Lab on 08/23/2023   Component Date Value    Glucose 08/23/2023 144 (H)     BUN 08/23/2023 23     Creatinine 08/23/2023 0.83     Sodium 08/23/2023 143     Potassium 08/23/2023 5.0     Chloride 08/23/2023 102     CO2 08/23/2023 29.6 (H)     Calcium 08/23/2023 9.4     Total Protein 08/23/2023 6.5     Albumin 08/23/2023 4.1     ALT (SGPT) 08/23/2023 12     AST (SGOT) 08/23/2023 27     Alkaline Phosphatase 08/23/2023 58     Total Bilirubin 08/23/2023 0.4     Globulin 08/23/2023 2.4     A/G Ratio 08/23/2023 1.7     BUN/Creatinine Ratio 08/23/2023 27.7 (H)     Anion Gap 08/23/2023 11.4     eGFR 08/23/2023 79.8     Magnesium 08/23/2023 1.9     Phosphorus 08/23/2023 3.1     WBC 08/23/2023 6.56     RBC 08/23/2023 3.71 (L)     Hemoglobin 08/23/2023 12.4     Hematocrit 08/23/2023 37.8     MCV 08/23/2023 101.9 (H)     MCH 08/23/2023 33.4 (H)     MCHC 08/23/2023 32.8     RDW 08/23/2023 15.3     RDW-SD 08/23/2023 56.7 (H)     MPV 08/23/2023 10.3     Platelets 08/23/2023 257     Neutrophil % 08/23/2023 73.6     Lymphocyte % 08/23/2023 18.1 (L)     Monocyte % 08/23/2023 5.6     Eosinophil % 08/23/2023 1.8     Basophil % 08/23/2023 0.6     Immature Grans % 08/23/2023 0.3     Neutrophils, Absolute 08/23/2023 4.82     Lymphocytes, Absolute 08/23/2023 1.19     Monocytes, Absolute 08/23/2023 0.37     Eosinophils, Absolute 08/23/2023 0.12     Basophils, Absolute 08/23/2023 0.04     Immature Grans, Absolute 08/23/2023 0.02     nRBC 08/23/2023 0.0    Admission on 07/30/2023, Discharged on 07/30/2023   Component Date Value    Color 07/30/2023 Dark Yellow     Clarity, UA 07/30/2023 Cloudy (A)     Glucose, UA 07/30/2023 Negative     Bilirubin 07/30/2023 Negative     Ketones, UA 07/30/2023 Trace (A)     Specific Eros  07/30/2023 1.030     Blood, UA 07/30/2023 3+ (A)     pH, Urine 07/30/2023 5.0     Protein, POC 07/30/2023 2+  (A)     Urobilinogen, UA 07/30/2023 Normal     Nitrite, UA 07/30/2023 Positive (A)     Leukocytes 07/30/2023 Small (1+) (A)    Office Visit on 07/10/2023   Component Date Value    CA 15-3 07/10/2023 12.2    Lab on 07/10/2023   Component Date Value    Glucose 07/10/2023 106 (H)     BUN 07/10/2023 24 (H)     Creatinine 07/10/2023 0.88     Sodium 07/10/2023 142     Potassium 07/10/2023 4.5     Chloride 07/10/2023 101     CO2 07/10/2023 31.5 (H)     Calcium 07/10/2023 9.8     Total Protein 07/10/2023 6.9     Albumin 07/10/2023 3.9     ALT (SGPT) 07/10/2023 13     AST (SGOT) 07/10/2023 30     Alkaline Phosphatase 07/10/2023 70     Total Bilirubin 07/10/2023 0.4     Globulin 07/10/2023 3.0     A/G Ratio 07/10/2023 1.3     BUN/Creatinine Ratio 07/10/2023 27.3 (H)     Anion Gap 07/10/2023 9.5     eGFR 07/10/2023 74.4     Magnesium 07/10/2023 2.0     Phosphorus 07/10/2023 4.0     WBC 07/10/2023 4.23     RBC 07/10/2023 4.03     Hemoglobin 07/10/2023 13.2     Hematocrit 07/10/2023 40.6     MCV 07/10/2023 100.7 (H)     MCH 07/10/2023 32.8     MCHC 07/10/2023 32.5     RDW 07/10/2023 15.0     RDW-SD 07/10/2023 56.3 (H)     MPV 07/10/2023 10.6     Platelets 07/10/2023 280     Neutrophil % 07/10/2023 64.4     Lymphocyte % 07/10/2023 22.2     Monocyte % 07/10/2023 7.1     Eosinophil % 07/10/2023 4.5     Basophil % 07/10/2023 0.9     Immature Grans % 07/10/2023 0.9 (H)     Neutrophils, Absolute 07/10/2023 2.72     Lymphocytes, Absolute 07/10/2023 0.94     Monocytes, Absolute 07/10/2023 0.30     Eosinophils, Absolute 07/10/2023 0.19     Basophils, Absolute 07/10/2023 0.04     Immature Grans, Absolute 07/10/2023 0.04     nRBC 07/10/2023 0.0    Labs reviewed    Current Psychotropic Medications:  Duloxetine 60 mg daily  Gabapentin 300 mg tid  Temazepam 15 mg q hs  Modafinil 200 mg q AM  Most medications managed per PCP, med onc    Plan of Care/ Medical Decision Making  Continue medications as written.  Considered risks of unmanaged CPAP;  pt agreeable to follow up with sleep med.  Supportive counseling provided by myself and Jacob Cody, oncology .  Pt goal to attend [employer] this weekend.  Follow up in 6 weeks, back to back apts with .    Diagnoses and all orders for this visit:    1. Neoplastic malignant related fatigue (Primary)  -     Ambulatory Referral to Sleep Medicine    2. Major depressive disorder, recurrent episode, moderate    3. MARIA M (obstructive sleep apnea)    I spent 36 minutes caring for Martha on this date of service. This time includes time spent by me in the following activities: preparing for the visit, reviewing tests, obtaining and/or reviewing a separately obtained history, performing a medically appropriate examination and/or evaluation, counseling and educating the patient/family/caregiver, ordering medications, tests, or procedures, referring and communicating with other health care professionals, and documenting information in the medical record.

## 2023-08-28 NOTE — PROGRESS NOTES
Date: August 28, 2023  Time In: 1500  Time Out: 1541  This provider is located at home address for [facility] (through [facility]), [address], [place] [address] using a secure [employer] Video Visit through [employer]. Patient is being seen remotely via telehealth at home address in [place] and stated they are in a secure environment for this session. The patient's condition being diagnosed/treated is appropriate for telemedicine. The provider identified herself as well as her credentials. The patient, and/or patients guardian, consent to be seen remotely, and when consent is given they understand that the consent allows for patient identifiable information to be sent to a third party as needed. They may refuse to be seen remotely at any time. The electronic data is encrypted and password protected, and the patient and/or guardian has been advised of the potential risks to privacy not withstanding such measures.     You have chosen to receive care through a telehealth visit.  Do you consent to use a video/audio connection for your medical care today? Yes    PROGRESS NOTE  Data:  Martha aDvey is a 62 y.o. female who presents today for follow up    Chief Complaint: anxiety     History of Present Illness: Pt expressed current stressors and provided insight regarding aunt's health and brother's behaviors. Pt discussed how brother had made attempts to make decisions regarding their aunt while Pt was out of town. Pt reports that she has been direct and open with communicating her emotions and thoughts with brother but still feels disregarded at times. Pt reports feeling overwhelmed, anxious, and frustrated.       Clinical Maneuvering/Intervention:    (Scales based on 0 - 10 with 10 being the worst)  Depression: 7 Anxiety: 7       Assisted patient in processing above session content; acknowledged and normalized patient's thoughts, feelings, and concerns.  Rationalized  patient thought process regarding recent stressors and life events. Discussed triggers associated with patient's emotions. Also discussed coping skills for patient to implement. Discussed boundaries, communication, and possibility of brother projecting emotions onto pt.      Allowed patient to freely discuss issues without interruption or judgment. Provided safe, confidential environment to facilitate the development of positive therapeutic relationship and encourage open, honest communication. Assisted patient in identifying risk factors which would indicate the need for higher level of care including thoughts to harm self or others and/or self-harming behavior and encouraged patient to contact this office, call 911, or present to the nearest emergency room should any of these events occur. Discussed crisis intervention services and means to access. Patient adamantly and convincingly denies current suicidal or homicidal ideation or perceptual disturbance.    Assessment:   Assessment   Patient appears to maintain relative stability as compared to their baseline.  However, patient continues to struggle with anxiety and depression which continues to cause impairment in important areas of functioning.  A result, they can be reasonably expected to continue to benefit from treatment and would likely be at increased risk for decompensation otherwise.    Mental Status Exam:   Hygiene:   good  Cooperation:  Cooperative  Eye Contact:  Good  Psychomotor Behavior:  Appropriate  Affect:  Appropriate  Mood: depressed and anxious  Speech:  Normal  Thought Process:  Linear  Thought Content:  Mood congruent  Suicidal:  None  Homicidal:  None  Hallucinations:  None  Delusion:  None  Memory:  Intact  Orientation:  Person, Place, Time and Situation  Reliability:  fair  Insight:  Fair  Judgement:  Fair  Impulse Control:  Fair  Physical/Medical Issues:  No        Patient's Support Network Includes:      Functional Status: Mild  impairment     Progress toward goal: Not at goal    Prognosis: Fair with Ongoing Treatment            Plan:    Patient will continue in individual outpatient therapy with focus on improved functioning and coping skills, maintaining stability, and avoiding decompensation and the need for higher level of care.    Patient will adhere to medication regimen as prescribed and report any side effects. Patient will contact this office, call 911 or present to the nearest emergency room should suicidal or homicidal ideations occur. Provide Cognitive Behavioral Therapy and Solution Focused Therapy to improve functioning, maintain stability, and avoid decompensation and the need for higher level of care.     Return in about 3 weeks, or earlier if symptoms worsen or fail to improve.           VISIT DIAGNOSIS:     ICD-10-CM ICD-9-CM   1. Major depressive disorder, recurrent episode, moderate  F33.1 296.32   2. Generalized anxiety disorder  F41.1 300.02        Diagnoses and all orders for this visit:    1. Major depressive disorder, recurrent episode, moderate (Primary)    2. Generalized anxiety disorder           [facility] No Show Policy:  We understand unexpected circumstances arise; however, anytime you miss your appointment we are unable to provide you appropriate care.  In addition, each appointment missed could have been used to provide care for others.  We ask that you call at least 24 hours in advance to cancel or reschedule an appointment.  We would like to take this opportunity to remind you of our policy stating patients who miss THREE or more appointments without cancelling or rescheduling 24 hours in advance of the appointment may be subject to cancellation of any further visits with our clinic and recommendation to seek in-person services/visits.    Please call [phone] to reschedule your appointment. If there are reasons that make it difficult for you to keep the appointments, please  call and let us know how we can help.  Please understand that medication prescribing will not continue without seeing your provider.      [facility]'s No Show Policy reviewed with patient at today's visit. Patient verbalized understanding of this policy. Discussed with patient that in the event that there are three or more no show visits, it will be recommended that they pursue in-person services/visits as noncompliance with telehealth visits indicates that patient is not an appropriate candidate for telemedicine and would likely be more appropriate for in-person services/visits. Patient verbalizes understanding and is agreeable to this.        This document has been electronically signed by Sophia Barron LCSW  August 28, 2023 14:43 EDT      Part of this note may be an electronic transcription/translation of spoken language to printed text using the Dragon Dictation System.

## 2023-08-31 ENCOUNTER — HOSPITAL ENCOUNTER (OUTPATIENT)
Dept: CT IMAGING | Facility: HOSPITAL | Age: 63
Discharge: HOME | End: 2023-08-31
Payer: MEDICARE

## 2023-08-31 VITALS
OXYGEN SATURATION: 99 % | BODY MASS INDEX: 35.3 KG/M2 | HEIGHT: 61 IN | RESPIRATION RATE: 20 BRPM | TEMPERATURE: 97.7 F | WEIGHT: 187 LBS | SYSTOLIC BLOOD PRESSURE: 129 MMHG | HEART RATE: 79 BPM | DIASTOLIC BLOOD PRESSURE: 66 MMHG

## 2023-08-31 DIAGNOSIS — C50.811 MALIGNANT NEOPLASM OF OVERLAPPING SITES OF RIGHT BREAST IN FEMALE, ESTROGEN RECEPTOR NEGATIVE: ICD-10-CM

## 2023-08-31 DIAGNOSIS — M89.9 LESION OF PELVIC BONE: ICD-10-CM

## 2023-08-31 DIAGNOSIS — Z17.1 MALIGNANT NEOPLASM OF OVERLAPPING SITES OF RIGHT BREAST IN FEMALE, ESTROGEN RECEPTOR NEGATIVE: ICD-10-CM

## 2023-08-31 LAB
INR PPP: 1.2 (ref 0.8–1.2)
PROTHROMBIN TIME: 14.3 SECONDS (ref 12.8–15.2)

## 2023-08-31 PROCEDURE — 85610 PROTHROMBIN TIME: CPT

## 2023-08-31 PROCEDURE — 25010000002 MIDAZOLAM PER 1 MG: Performed by: RADIOLOGY

## 2023-08-31 PROCEDURE — 99152 MOD SED SAME PHYS/QHP 5/>YRS: CPT

## 2023-08-31 PROCEDURE — 25010000002 FENTANYL CITRATE (PF) 50 MCG/ML SOLUTION: Performed by: RADIOLOGY

## 2023-08-31 PROCEDURE — 77012 CT SCAN FOR NEEDLE BIOPSY: CPT

## 2023-08-31 PROCEDURE — 88307 TISSUE EXAM BY PATHOLOGIST: CPT | Performed by: INTERNAL MEDICINE

## 2023-08-31 PROCEDURE — 88311 DECALCIFY TISSUE: CPT | Performed by: INTERNAL MEDICINE

## 2023-08-31 PROCEDURE — 0 LIDOCAINE 1 % SOLUTION: Performed by: INTERNAL MEDICINE

## 2023-08-31 RX ORDER — MIDAZOLAM HYDROCHLORIDE 1 MG/ML
0.5 INJECTION INTRAMUSCULAR; INTRAVENOUS ONCE AS NEEDED
Status: COMPLETED | OUTPATIENT
Start: 2023-08-31 | End: 2023-08-31

## 2023-08-31 RX ORDER — FENTANYL CITRATE 50 UG/ML
INJECTION, SOLUTION INTRAMUSCULAR; INTRAVENOUS AS NEEDED
Status: COMPLETED | OUTPATIENT
Start: 2023-08-31 | End: 2023-08-31

## 2023-08-31 RX ORDER — MIDAZOLAM HYDROCHLORIDE 1 MG/ML
INJECTION INTRAMUSCULAR; INTRAVENOUS AS NEEDED
Status: COMPLETED | OUTPATIENT
Start: 2023-08-31 | End: 2023-08-31

## 2023-08-31 RX ORDER — SODIUM CHLORIDE 0.9 % (FLUSH) 0.9 %
10 SYRINGE (ML) INJECTION AS NEEDED
Status: DISCONTINUED | OUTPATIENT
Start: 2023-08-31 | End: 2023-09-01 | Stop reason: HOSPADM

## 2023-08-31 RX ORDER — LIDOCAINE HYDROCHLORIDE 10 MG/ML
20 INJECTION, SOLUTION INFILTRATION; PERINEURAL ONCE
Status: COMPLETED | OUTPATIENT
Start: 2023-08-31 | End: 2023-08-31

## 2023-08-31 RX ORDER — SODIUM CHLORIDE 9 MG/ML
25 INJECTION, SOLUTION INTRAVENOUS ONCE
Status: COMPLETED | OUTPATIENT
Start: 2023-08-31 | End: 2023-08-31

## 2023-08-31 RX ADMIN — FENTANYL CITRATE 50 MCG: 50 INJECTION, SOLUTION INTRAMUSCULAR; INTRAVENOUS at 09:13

## 2023-08-31 RX ADMIN — SODIUM CHLORIDE 25 ML/HR: 9 INJECTION, SOLUTION INTRAVENOUS at 09:03

## 2023-08-31 RX ADMIN — LIDOCAINE HYDROCHLORIDE 20 ML: 10 INJECTION, SOLUTION INFILTRATION; PERINEURAL at 09:12

## 2023-08-31 RX ADMIN — MIDAZOLAM 0.5 MG: 1 INJECTION INTRAMUSCULAR; INTRAVENOUS at 08:43

## 2023-08-31 RX ADMIN — MIDAZOLAM 1 MG: 1 INJECTION INTRAMUSCULAR; INTRAVENOUS at 09:13

## 2023-08-31 NOTE — DISCHARGE INSTRUCTIONS

## 2023-08-31 NOTE — NURSING NOTE
1103 I phoned pathology and spoke to Sierra.  I verified that she saw the note from Dr. Hancock on 8/23/23 specifically item #14 regarding the additional pathology testing that he desired.  She reviewed this information and said that there was nothing further for me to do on this specimen.    1107 Patient dressed and to wheelchair, taken to patient discharge with staff member, her  is driving her home now.

## 2023-08-31 NOTE — POST-PROCEDURE NOTE
POST PROCEDURE NOTE    Procedure: ct superior pubic ramus biopsy    Pre-Procedure Diagnosis: breast cancer, bone lesion    Post-procedure Diagnosis: same    Findings: technically successful ct guided right superior pubic ramus bone lesion biopsy    Complications: no immediate    Blood loss: none    Specimen Removed: 13 gauge core    Disposition:   Discharge home

## 2023-09-01 ENCOUNTER — TELEPHONE (OUTPATIENT)
Dept: INTERVENTIONAL RADIOLOGY/VASCULAR | Facility: HOSPITAL | Age: 63
End: 2023-09-01
Payer: MEDICARE

## 2023-09-02 LAB
LAB AP CASE REPORT: NORMAL
LAB AP CLINICAL INFORMATION: NORMAL
LAB AP DIAGNOSIS COMMENT: NORMAL
PATH REPORT.FINAL DX SPEC: NORMAL
PATH REPORT.GROSS SPEC: NORMAL

## 2023-09-07 ENCOUNTER — HOSPITAL ENCOUNTER (OUTPATIENT)
Dept: CT IMAGING | Facility: HOSPITAL | Age: 63
End: 2023-09-07
Payer: MEDICARE

## 2023-09-08 ENCOUNTER — HOSPITAL ENCOUNTER (OUTPATIENT)
Facility: HOSPITAL | Age: 63
Discharge: HOME | End: 2023-09-08
Admitting: INTERNAL MEDICINE
Payer: MEDICARE

## 2023-09-08 DIAGNOSIS — Z17.1 MALIGNANT NEOPLASM OF OVERLAPPING SITES OF RIGHT BREAST IN FEMALE, ESTROGEN RECEPTOR NEGATIVE: ICD-10-CM

## 2023-09-08 DIAGNOSIS — C50.811 MALIGNANT NEOPLASM OF OVERLAPPING SITES OF RIGHT BREAST IN FEMALE, ESTROGEN RECEPTOR NEGATIVE: ICD-10-CM

## 2023-09-08 PROCEDURE — 74177 CT ABD & PELVIS W/CONTRAST: CPT

## 2023-09-08 PROCEDURE — 71260 CT THORAX DX C+: CPT

## 2023-09-08 PROCEDURE — 25510000001 IOPAMIDOL 61 % SOLUTION: Performed by: INTERNAL MEDICINE

## 2023-09-08 RX ADMIN — IOPAMIDOL 100 ML: 612 INJECTION, SOLUTION INTRAVENOUS at 16:15

## 2023-09-11 ENCOUNTER — HOSPITAL ENCOUNTER (OUTPATIENT)
Dept: NUCLEAR MEDICINE | Facility: HOSPITAL | Age: 63
Discharge: HOME | End: 2023-09-11
Payer: MEDICARE

## 2023-09-11 DIAGNOSIS — Z17.1 MALIGNANT NEOPLASM OF OVERLAPPING SITES OF RIGHT BREAST IN FEMALE, ESTROGEN RECEPTOR NEGATIVE: ICD-10-CM

## 2023-09-11 DIAGNOSIS — C50.811 MALIGNANT NEOPLASM OF OVERLAPPING SITES OF RIGHT BREAST IN FEMALE, ESTROGEN RECEPTOR NEGATIVE: ICD-10-CM

## 2023-09-11 PROCEDURE — A9503 TC99M MEDRONATE: HCPCS | Performed by: INTERNAL MEDICINE

## 2023-09-11 PROCEDURE — 78306 BONE IMAGING WHOLE BODY: CPT

## 2023-09-11 PROCEDURE — 0 TECHNETIUM MEDRONATE KIT: Performed by: INTERNAL MEDICINE

## 2023-09-11 RX ORDER — TC 99M MEDRONATE 20 MG/10ML
24 INJECTION, POWDER, LYOPHILIZED, FOR SOLUTION INTRAVENOUS
Status: COMPLETED | OUTPATIENT
Start: 2023-09-11 | End: 2023-09-11

## 2023-09-11 RX ADMIN — Medication 24 MILLICURIE: at 08:15

## 2023-09-13 NOTE — PROGRESS NOTES
"Chief Complaint  Previous Stage Ib ([id number]) ER/OK positive, HER-2/peter negative right breast cancer with subsequent metastatic disease identified 10/8/2017, history of right pulmonary embolism, cancer related pain, chemotherapy-induced diarrhea, chemotherapy-induced mucositis, chemotherapy-induced hand-foot syndrome    Subjective        History of Present Illness  The patient returns today in delayed follow-up continuing on oral Xeloda 1000 mg in the morning, 1500 mg in the evening for 7 days on followed by 7 days off as well as monthly Xgeva and Eliquis 5 mg twice daily.  The patient initiated an \"on\" week of Xeloda yesterday.  She is due for Xgeva on 9/20/2023.  She is back today to review CT scan and bone scan results as well as results from CT-guided biopsy of right pubic ramus bone lesion performed on 8/31/2023.  Patient tolerated the biopsy well, no difficulties.  In the interval since her last visit, she notes that she has been somewhat more fatigued but feels that this is mainly related to emotional stress.  She has had considerable stress in dealing with her 's medical issues and malignancy in addition to her own.  Unfortunately he had a fall yesterday but did not experience any significant injury.  Patient notes that she may have somewhat more shoulder discomfort recently, symptoms that do wax and wane for her over time.  She also has some intermittent irritation in her left foot and continues routine follow-up with her podiatrist.  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  She did receive treatment for urinary tract infection with culture on 8/23/2023 that grew E. coli resistant to ampicillin and Bactrim, received 5-day course of Cipro with resolution of symptoms.  She continues follow-up with supportive oncology, last seen on " "8/25/2023.      Objective   Vital Signs:   /84   Pulse 91   Temp 98.6 °F (37 °C) (Temporal)   Resp 18   Ht 154.9 cm (60.98\")   Wt 81.2 kg (179 lb 1.6 oz)   SpO2 97%   BMI 33.86 kg/m²     Physical Exam  Constitutional:       Appearance: She is well-developed.      Comments: Patient ambulates with the use of a cane   Eyes:      Conjunctiva/sclera: Conjunctivae normal.   Neck:      Thyroid: No thyromegaly.   Cardiovascular:      Rate and Rhythm: Normal rate and regular rhythm.      Heart sounds: No murmur heard.    No friction rub. No gallop.   Pulmonary:      Effort: No respiratory distress.      Breath sounds: Normal breath sounds.   Abdominal:      General: Bowel sounds are normal. There is no distension.      Palpations: Abdomen is soft.      Tenderness: There is no abdominal tenderness.   Musculoskeletal:      Comments: Braces in place in the bilateral lower extremities   Lymphadenopathy:      Head:      Right side of head: No submandibular adenopathy.      Cervical: No cervical adenopathy.      Upper Body:      Right upper body: No supraclavicular adenopathy.      Left upper body: No supraclavicular adenopathy.   Skin:     General: Skin is warm and dry.      Findings: Rash present.      Comments: Erythema involving the palms is currently minimal.  The degree of skin peeling is moderate, fairly stable.  There is minimal erythema involving the dorsal aspect of the fingers.  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      Cranial Nerves: No cranial nerve deficit.      Motor: No abnormal muscle tone.      Deep Tendon Reflexes: Reflexes normal.   Psychiatric:         Behavior: Behavior normal.       Result Review : Reviewed CBC, CMP from today.  Reviewed results from CT-guided biopsy right pubic ramus from 8/31/2023.  Reviewed CT chest abdomen pelvis from 9/8/2023 and bone scan from 9/11/2023.   "   Assessment and Plan     *Previous Stage Ib ([id number]) right breast cancer:  Diagnosed May 2010 with excisional biopsy for invasive ductal carcinoma, 1.3 cm, grade 2, ER 90%, MD 80%, HER-2/peter negative (1+ IHC).    Subsequent right mastectomy in July 2010 with no residual breast malignancy, 1/5 sentinel lymph node with micrometastasis (0.25 mm).    Treated in the [facility] system with adjuvant AC ×4 cycles in 2010 (no taxanes administered due to underlying Charcot-Saloni-Tooth with peripheral neuropathy).    Adjuvant Femara (postmenopausal) initiated October 2010 with plan to treat ×10 years.    Genetic testing reportedly negative.    Developed osteopenia treated with Prolia beginning 2/27/13. Subsequently discontinued due to identification of metastatic disease.    *Recurrent/metastatic disease identified 10/8/17:  Disease involving thoracic spine with cord compression at T6, lumbosacral involvement, sternal and right sternoclavicular involvement.    Femara discontinued in 10/2017.    Radiation administered (in the [facility] system) to the thoracic spine beginning 10/19/17 treating T3-T9 to a dose of 24 gray in 6 fractions.  Evaluation with MRI 12/8/17 showing persistent T6 cord compression with persistent neurologic compromise requiring surgical treatment 12/11/17 with T6 laminectomy/corpectomy and T3-T9 fusion.  Pathology with metastatic carcinoma of breast origin, ER negative, MD negative, HER-2/peter negative (1+ IHC).  Additional staging evaluation 12/8/17 with no evidence of visceral metastatic disease, bone scan showing involvement of thoracic spine, sternum, left humerus, mid frontal bone.  No plane film correlate of left humerus lesion.  MRI lumbar spine with small intradural L3 metastasis.  CA 15-3 12/6/17- 17.  Palliative radiation therapy to L3 dural metastasis and left humerus initiated 1/15/18 (10 fractions), completed 1/26/18.  Hypercalcemia of malignancy with calcium in the 10-11 range.  Initiation of  monthly Xgeva 1/23/18.  Baseline CT scan 1/30/18 with no evidence of visceral involvement.  Cluster of nodular opacities in the right lower lobe suspected to be infectious or related to bronchiolitis. Bone scan 1/30/18 showed postsurgical change in the thoracic spine, stable uptake in the frontal bone, no new areas of disease.  Initiation of palliative oral single agent Xeloda 2/7/18 2000 mg a.m., 1500 mg p.m. for 14/21 days.   Following 3 cycles xeloda, bone scan 4/4/18 showed no change from the prior study.  CT scan 4/4/18 showed a small pericardial effusion of unclear significance as well as a subcutaneous nodule in the right lateral chest wall.  Subsequent evaluation with echocardiogram 4/17/18 showed no evidence of pericardial effusion.  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  We discussed the possibility of surgical excision of the right subcutaneous chest wall lesion for more definitive diagnosis.  Reviewed previous CT images dating back to 12/8/17 and the lesion was present even at that time measuring around 1.7 cm although not commented on in the radiology report.  As this appears to be an indolent low-grade process unrelated to her breast cancer, recommendee foregoing surgical excision at this time and monitoring this area on future scans.  The patient agreed.    Following 6 cycles of Xeloda, CT 6/6/18  showed stable findings, no evidence of progressive disease.  There was a comment regarding subcutaneous abnormality in the anterior abdominal wall and this was related to Lovenox injection sites.  Bone scan 6/6/18 showed no interval change.   CT scan and bone scan 8/13/18 following 9 cycles of Xeloda showed stable findings with no evidence of significant visceral metastases.  Her bone lesions appear stable on bone scan.  The spindle cell neoplasm in her right  chest wall actually decreased in size from 2 cm down to 1.6 cm.    The patient experienced some symptoms of diarrhea, anorexia, generalized weakness during cycle 9 Xeloda it was unclear whether this was related to a viral gastroenteritis or toxicity from treatment.  Symptoms recurred during cycle 10 and treatment was cut short by 2 days.  Symptoms attributed to Xeloda.  With cycle 11, dose and schedule altered to 1500 mg twice daily for 7 days on followed by 7 days off .  CT scan 9/9/2020 with no significant changes.  Bone scan 9/9/2020 read as unchanged from prior studies however did note an area of slight activity in the medial left femur.  Contacted radiology and although this was not noted on prior reports appears to have been present.  Subsequent MRI left femur 9/21/2020 with cortical thickening and periosteal edema left iliac us muscle insertion to the medial left femur with no evidence of metastatic disease, favored to represent periosteal change secondary to insertional tendinitis.  Severe hand-foot symptoms causing sclerodactyly and limitation in finger movement prompted change in dosing in July 2021 with Xeloda decreased to 1000 mg a.m., 1500 mg p.m. for 7 days on followed by 7 days off.  Patient was experiencing severe hand-foot syndrome related to Xeloda having developed skin peeling, sclerodactyly with limited use of her hands.  On 3/31/2022, Xeloda was held to allow for recovery.  Patient resumed Xeloda on 4/18/2022.  Patient required hospitalization 5/29 - 6/1/2022 with presumed viral gastroenteritis that was exacerbated by Xeloda.  Xeloda held briefly, resumed on 6/6/2022.  Patient again with worsening sclerodactyly, Xeloda held for 2 weeks from 10/3 - 10/17/2022, resumed at prior dose with improvement in symptoms.  Scans on 3/3/2023 with CT chest abdomen pelvis and bone scan showing stable findings.  CT chest abdomen pelvis 7/3/2023 with questionable 1 mm change in size right lower lobe nodule.   "Additional small pulmonary nodules stable.  Stable bony metastatic disease.  Bone scan on 7/7/2023 however showed slight progression focal involvement right ischium and superior pubic ramus (new ischial lesion superior pubic ramus compared to 10/24/2022 and more conspicuous compared to 3/3/2023).  Metastatic lesion right inferior pubic ramus and ischial tuberosity increased in size since October 2022 however stable from 3/3/2023.  MRI cervical spine 7/3/2023 with no metastatic involvement however identification of the lesion at T2, recommended dedicated thoracic spine MRI to evaluate further.  Given the minimal extent and slow degree of progression, elected to continue oral Xeloda and evaluate further with MRI pelvis and thoracic spine.  Consideration of biopsy pelvic metastasis for repeat ER/OR/HER2/peter testing.    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  CA 15-3 on 7/10/2023 was 12.2, uninformative.  MRI thoracic spine 7/27/2023 with 1 cm metastatic focus seen in L1  MRI pelvis 7/27/2023 with metastatic foci identified right superior pubic ramus, right ischial tuberosity, inferior pubic ramus, L5 body.  Chronic appearing posttraumatic and/or degenerative change in the posterior right ilium adjacent SI joint.  CT-guided biopsy right pubic ramus lesion on 8/31/2023.  Pathology showed no evidence of malignancy, showed markedly calcified and sclerotic mature lamellar bone.  Attempted decalcification but no evidence of malignancy.  CT chest abdomen pelvis 9/8/2023 and bone scan 9/11/2023 with stable findings.  The patient returns today continuing on treatment with Xeloda 1000 mg a.m., 1500 mg p.m. 7 days on followed by 7 days off.  She also continues on monthly Xgeva, due next on 9/20/2023.  Yesterday she started an \"on\" week of Xeloda.  She is tolerating treatment reasonably well, hand-foot symptoms are fairly stable currently.  We reviewed her recent CT " biopsy of the right pubic ramus lesion.  This showed no evidence of malignancy, did show markedly calcified and sclerotic bone which may represent a treated metastasis.  Unclear whether this is a false negative result based on sampling error.  We did review her scans as noted above with CT chest abdomen pelvis 9/8/2023 and bone scan 9/11/2023 that show stable findings.  She is asymptomatic from her disease currently.  Unclear whether the changes seen on her previous bone scan were artifactual.  Without definitive evidence of further progression, I have recommended that we maintain her current treatment and the patient agrees.  We will plan to repeat scans at a 3-month interval.  In the meantime she will return for Xgeva on 9/20/2023 and 4 weeks later will have NP visit with Xgeva.  I will see her in 8 weeks when she is due for Xgeva and again in 12 weeks and just prior to that visit we will repeat CT scans and bone scan.    *Right pulmonary embolism:  Diagnosed on CT angiogram 10/21/17 involving small right lower lobe pulmonary artery.  Lower extremity Dopplers negative.  Bilateral lower extremity Dopplers negative again 12/5/17.  Received chronic Lovenox 1 mg/kg twice per day, transitioned to oral Eliquis in February 2019, continuing on Eliquis 5 mg twice daily.  Patient continues on Eliquis 5 mg twice daily    *Cancer related pain:  Previously receiving Duragesic 50 µg patch every 72 hours along with Dilaudid 4 mg as needed for breakthrough pain  The patient's pain improved over time and she was able to discontinue both Duragesic and Dilaudid in the interval.  Patient does take occasional Flexeril at bedtime due to back spasm/pain when she has been more active.  The patient does have some occasional aggravation of her chronic back pain and does use tramadol 50 mg every 8 hours as needed  Patient was receiving tramadol 50 mg every 8-12 hours as needed in addition to hydrocodone 5/325 every 4 hours as needed.  She  was also taking OTC NSAIDs.  Patient developed nausea related to hydrocodone and was transitioned to Percocet 5/325 every 4 hours as needed, advised to stop taking NSAIDs with ongoing anticoagulation.  Patient notes that the pain is currently under fair control.    *Chemotherapy-induced diarrhea with subsequent C. difficile colitis in the setting of previous ulcerative colitis and then identification of enteropathogenic E. coli:  Patient with reported history of ulcerative colitis, continues on mesalamine.  The patient developed initial diarrhea related to Xeloda at regular dosing.  Symptoms improved on reduced dose Xeloda  Flare of symptoms in October 2018 with apparent finding of C. difficile colitis by GI, treated with course of oral vancomycin with improvement in symptoms.  Patient notes minimal intermittent diarrhea/loose stools on Xeloda requiring occasional dosing of Imodium.    Patient required hospitalization 5/29 - 6/1/2022 with presumed viral gastroenteritis that was exacerbated by Xeloda.  Xeloda held briefly, resumed on 6/6/2022.  Patient's  developed C. difficile colitis and patient was experiencing increase in diarrhea.  Stool studies performed 8/4/22 negative C. difficile however GI PCR was positive for enteropathogenic E. coli.  Given patient's symptoms and immunocompromise status it was elected to treat her with azithromycin 500 mg daily x3 days beginning 8/5/2022  Diarrhea resolved  Patient underwent colonoscopy on 2/28/2023 with findings of diverticulosis    *Traumatic left tibia/fibular fracture:  Status post ORIF 12/6/17  Specimen was sent for pathologic review, negative for evidence of malignancy    *Hypercalcemia:  Suspect hypercalcemia of malignancy, calcium in  10-11 range previously.  Calcium normalized following initiation of monthly Xgeva on 1/23/18.    *Chemotherapy-induced mucositis:  Patient had a minimal degree of mucositis with cycle 2.  The patient has magic mouthwash to use  as needed.  No subsequent mucositis.    *Recurrent UTI, bladder wall thickening on CT:  Patient had an enterococcal UTI on 3/2/18 sensitive to nitrofurantoin and received treatment, unclear how long.  Recurrent UTI 3/20/18 with urine culture growing Klebsiella, initially treated with nitrofurantoin, transitioned to Levaquin.  CT 4/4/18 with diffuse bladder wall thickening with increased nodular thickening at the left base.  Referral to urogynecology Dr. May Johnson.  She was placed on a prophylactic dose of trimethoprim 100 mg daily, bladder wall thickening felt to be related to recent recurrent urinary tract infections.  Patient with urinary symptoms, treated with course of Macrobid at the end of December 2018, urine culture however was negative 12/31/18.  Patient was found to have Klebsiella UTI 7/29/2019 which was successfully treated with Macrobid with complete resolution of symptoms.  CT 8/10/2021 with diffuse bladder wall thickening new from 5/17/2021 (however seen on multiple prior scans).    UTI on 10/18/2021 with E. coli greater than 100,000 colonies that was pansensitive, treated with Macrobid x7 days  With ongoing/recurrent UTIs patient was seen by urogynecology and initiated suppressive therapy with trimethoprim 100 mg daily in December 2021.  She has not experienced any further urinary tract infections.  CT abdomen pelvis 3/28/2022 does show diffuse thickening of urinary bladder as has been seen on prior studies indicative of cystitis.  Patient notes that she did stop taking trimethoprim on a daily basis.    Patient with urine culture on 5/5/2023 that grew E. coli and she received antibiotic treatment from urogynecology.    Urine culture on 8/23/2023 with greater than 100,000 colonies of E. coli resistant to ampicillin and Bactrim.  Received 5-day course of Cipro with resolution of symptoms.    *Mobility:  The patient underwent an intensive course of rehabilitation at [facility].  She graduated from her  outpatient course November 2018.  Overall the patient has improved dramatically in terms of mobility,   Patient developed significant callus formation lateral aspect of the left plantar surface.  She did meet with her brace specialist and additional padding was added in this area.  She continues to follow-up with her podiatrist Dr. Ware.    *Depression:  The patient is continuing follow-up in the supportive oncology clinic and is currently continuing on Cymbalta 30 mg twice daily as well as gabapentin 600 mg nightly, temazepam 15 mg nightly as needed, Provigil 100 mg daily.  Patient does continue to attend support groups at [employer].  The patient does continue routine follow-up in supportive oncology clinic.    Patient does have multiple stressors with her 's malignancy and chemotherapy as well as her autistic son who is living with them at home.  Patient continues to have difficulty with stress and anxiety, is being followed by supportive oncology, last seen on 8/25/2023.    *Hand-foot syndrome secondary to Xeloda:  Patient continues with frequent application of emollient cream to the hands and feet  Symptoms increased significantly requiring a 1 week delay in cycle 18 Xeloda as noted above.  Symptoms did improve and she continued on the same dose.    Patient was referred to dermatology and has been continuing on triamcinolone 0.1% cream used for 1 week on followed by 1 week off which led to some further improvement.   Progressive palmar erythema with development of sclerodactyly and effect on dexterity.  Addition of urea-based cream 3 times daily.  Patient with continued difficulty regarding erythema of her hands that extended onto the dorsal aspect and was causing contractures/sclerodactyly in her fingers affecting her dexterity.  In July 2021, held Xeloda for an additional week and then reduced dose from 1500 mg twice daily down to 1000 mg a.m. and 1500 mg p.m. and continued on a 7-day on followed  by 7-day off schedule.  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  On 3/31/2022, Xeloda was held to allow for recovery.  Patient resumed Xeloda on 4/18/2022.  Patient developed worsening sclerodactyly affecting dexterity that required Xeloda to again be briefly held for 2 weeks from 10/3 - 10/17/2022.  Treatment resumed at prior dose after improvement in symptoms.  Patient continues to use emollient cream frequently (currently 5 times daily) and topical triamcinolone 0.1% twice daily intermittently (2 weeks on followed by 2 weeks off as instructed by dermatology).  Symptoms currently stable with mild erythema of the palms of the hands and decrease and skin peeling.  Minimal erythema currently on the dorsal aspect of the fingers.  Sclerodactyly and effect on dexterity stable.    *Evidence of steatosis on scans with mild intermittent elevated liver function studies:  Liver function studies increased 8/20/19 with ALT 98, AST 70, normal total bilirubin.  Negative viral hepatitis A, B, and C panel 8/23/18  Likely related to hepatic steatosis.   Subsequent improvement in LFTs  LFTs today are normal    *Chemotherapy induced leukopenia:  WBC today 4.19 with normal differential    *GERD, question of celiac disease:  Patient with significant history of reflux  Patient currently receiving Protonix 40 mg daily daily and Carafate 1 g 4 times daily as needed  Patient required hospitalization 5/29 - 6/1/2022 with presumed viral gastroenteritis that was exacerbated by Xeloda.    EGD performed 5/31/2022 during hospitalization with biopsy that showed focal blunting of villi and atrophy with slight increase in intraepithelial lymphocytes.  Changes were minimal but recommended correlation with  serology to exclude celiac disease.  Celiac serology 8/8/2022 negative  Patient previously developed worsening reflux symptoms, temporarily increase Protonix to 40 mg twice daily and we did add Carafate 1 g 4 times daily.    She is taking Protonix 40 mg once daily, is not taking Carafate.    *Insomnia:  Patient with prior paradoxical reaction to Benadryl  Improved previously on temazepam 15 mg nightly as needed.   Patient noted subsequently that temazepam was having no effect.   Patient increased gabapentin to 900 mg nightly, discontinued temazepam    *Health maintenance:  Patient notes that she has a history of colon polyps as well as ulcerative colitis and was due for a follow-up colonoscopy on 9/12/2019.  We did discuss there is no necessity to pursue colonoscopy in the setting of her metastatic breast cancer.    The patient did undergo colonoscopy on 2/7/2020 with findings of muscular hypertrophy and diverticulosis.   Patient did wish to proceed with further colonoscopic evaluation, performed on 12/20/2022 with poor prep.  Repeat 2/28/2023 with diverticulosis.    *Bilateral shoulder pain:  Chronic difficulty with right shoulder although she has not complained of this in prior visits to our office.  She reported difficulty with abduction.    The patient did undergo MRI of her right shoulder on 11/21/2019 at an outside facility showing multiple abnormalities including supraspinatus tendinosis, labral tear but no evidence of metastatic disease.  Patient developed pain in the left shoulder, was seen by orthopedics and underwent steroid injection with some improvement.  Right shoulder stable.  Patient did undergo physical therapy with some improvement.  She continues to use tramadol 50 mg every 8-12 hours as needed.  Note that current CT 10/20/2022 made notation of left shoulder probable bursitis.    Patient continues with bilateral shoulder pain, left greater than right which does wax and wane.    *Ocular changes in  "part related to Xeloda:  Patient experienced a mild degree of blurred vision as well as burning and pruritus.  She was seen by her ophthalmologist and was placed on xiidra ophthalmic drops which have helped.  Likely both issues are to some extent related to Xeloda.  Symptoms did worsen and patient was seen by ophthalmologist at [facility].  She is now using an ocular lubricant at night in addition to artificial tears which have helped.  Symptoms currently stable to improved    *Elevated glucose:  It is noted the patient's glucose at the last few visits has been in the high 100 range, postprandial.  She has had a hemoglobin A1c that has been in the high 5-6 range in the past, on 5/1/2019 at 5.7  Hemoglobin A1c was last checked in 11/12/2021 at 5.8    *Possible loosening of pedicle screw at T3:  CT cervical spine 5/17/2021 showed loosening of the left pedicle screw at T3.  Patient referred to orthopedics and was seen by Dr. Nayak who felt that there were no concerning findings on the scan    *Iron deficiency anemia  Labs on 5/2/2022 with hemoglobin 10.8.  Additional labs with iron 48, ferritin 21.2, iron saturation 11%, TIBC 4 and 32, folate greater than 20, B12 greater than 2000.    Attempted treatment with oral iron daily however patient was intolerant  Patient subsequently received Venofer 300 mg weekly x4 beginning 6/6/2022 and completed on 6/27/2022  Subsequent iron studies on 7/11/2022 showed improvement with iron 62, ferritin 345, iron saturation 18%, TIBC 336.  Hemoglobin had improved up to 12.7 at that time.  Repeat iron studies on 10/3/2022 showed iron replete status with hemoglobin 13.7, iron 121, ferritin 208.7, iron saturation 31%, TIBC 392.  Hemoglobin currently normal at 13.2     Plan:  Continue palliative single agent Xeloda 1000 mg a.m., 1500 mg in the p.m. 7 days on followed by 7 days off (patient initiated a \"on\" week of treatment yesterday)  Continue monthly Xgeva, due next on " 9/20/2023  Continue Eliquis 5 mg twice daily  The patient will continue frequent use of emollient cream currently 5 times daily and continue periodic triamcinolone cream 0.1% twice daily (provided by dermatology) 2 weeks on followed by 2 weeks off as prescribed  Continue Protonix 40 mg daily  Continue ocular lubricating gel nightly per ophthalmology  Continue Provigil 100 mg daily and Cymbalta 30 mg twice daily.  Patient continues routine follow-up with supportive oncology   Patient will continue gabapentin 900 mg at night.   Patient continues on tramadol 50 mg every 8 hours as needed for pain  Continue Percocet 5/325 every 4 hours as needed for pain (reports nausea from hydrocodone she received previously)  The patient continues follow-up with her podiatrist and brace specialist regarding callus formation and scaling in the lateral left plantar surface  On 10/18/2023 NP visit with CBC, CMP, magnesium, phosphorus and Xgeva.  On 11/15/2023 MD visit with CBC, CMP, magnesium, phosphorus and Xgeva.  On 12/13/2023 MD visit with CBC, CMP, magnesium, phosphorus and Xgeva.  1 week prior CT chest abdomen pelvis and bone scan.     Patient continuing on high risk medication requiring intensive monitoring.       I did spend 45 minutes in total time caring for this patient today, time spent in review of records, face-to-face consultation, coordination of care, placement of orders, completion of documentation.

## 2023-09-14 ENCOUNTER — OFFICE VISIT (OUTPATIENT)
Dept: ONCOLOGY | Facility: CLINIC | Age: 63
End: 2023-09-14
Payer: MEDICARE

## 2023-09-14 ENCOUNTER — LAB (OUTPATIENT)
Dept: LAB | Facility: HOSPITAL | Age: 63
End: 2023-09-14
Payer: MEDICARE

## 2023-09-14 VITALS
RESPIRATION RATE: 18 BRPM | OXYGEN SATURATION: 97 % | WEIGHT: 179.1 LBS | HEIGHT: 61 IN | DIASTOLIC BLOOD PRESSURE: 84 MMHG | SYSTOLIC BLOOD PRESSURE: 147 MMHG | HEART RATE: 91 BPM | TEMPERATURE: 98.6 F | BODY MASS INDEX: 33.81 KG/M2

## 2023-09-14 DIAGNOSIS — Z17.1 MALIGNANT NEOPLASM OF OVERLAPPING SITES OF RIGHT BREAST IN FEMALE, ESTROGEN RECEPTOR NEGATIVE: Primary | ICD-10-CM

## 2023-09-14 DIAGNOSIS — Z17.1 MALIGNANT NEOPLASM OF OVERLAPPING SITES OF RIGHT BREAST IN FEMALE, ESTROGEN RECEPTOR NEGATIVE: ICD-10-CM

## 2023-09-14 DIAGNOSIS — C50.811 MALIGNANT NEOPLASM OF OVERLAPPING SITES OF RIGHT BREAST IN FEMALE, ESTROGEN RECEPTOR NEGATIVE: Primary | ICD-10-CM

## 2023-09-14 DIAGNOSIS — C50.811 MALIGNANT NEOPLASM OF OVERLAPPING SITES OF RIGHT BREAST IN FEMALE, ESTROGEN RECEPTOR NEGATIVE: ICD-10-CM

## 2023-09-14 LAB
ALBUMIN SERPL-MCNC: 4.5 G/DL (ref 3.5–5.2)
ALBUMIN/GLOB SERPL: 2.1 G/DL
ALP SERPL-CCNC: 57 U/L (ref 39–117)
ALT SERPL W P-5'-P-CCNC: 15 U/L (ref 1–33)
ANION GAP SERPL CALCULATED.3IONS-SCNC: 13.1 MMOL/L (ref 5–15)
AST SERPL-CCNC: 25 U/L (ref 1–32)
BASOPHILS # BLD AUTO: 0.05 10*3/MM3 (ref 0–0.2)
BASOPHILS NFR BLD AUTO: 1.2 % (ref 0–1.5)
BILIRUB SERPL-MCNC: 0.4 MG/DL (ref 0–1.2)
BUN SERPL-MCNC: 25 MG/DL (ref 8–23)
BUN/CREAT SERPL: 26.9 (ref 7–25)
CALCIUM SPEC-SCNC: 9.6 MG/DL (ref 8.6–10.5)
CHLORIDE SERPL-SCNC: 101 MMOL/L (ref 98–107)
CO2 SERPL-SCNC: 27.9 MMOL/L (ref 22–29)
CREAT SERPL-MCNC: 0.93 MG/DL (ref 0.6–1.1)
DEPRECATED RDW RBC AUTO: 60.1 FL (ref 37–54)
EGFRCR SERPLBLD CKD-EPI 2021: 69.6 ML/MIN/1.73
EOSINOPHIL # BLD AUTO: 0.21 10*3/MM3 (ref 0–0.4)
EOSINOPHIL NFR BLD AUTO: 5 % (ref 0.3–6.2)
ERYTHROCYTE [DISTWIDTH] IN BLOOD BY AUTOMATED COUNT: 16.1 % (ref 12.3–15.4)
GLOBULIN UR ELPH-MCNC: 2.1 GM/DL
GLUCOSE SERPL-MCNC: 121 MG/DL (ref 65–99)
HCT VFR BLD AUTO: 39.8 % (ref 34–46.6)
HGB BLD-MCNC: 13.2 G/DL (ref 12–15.9)
IMM GRANULOCYTES # BLD AUTO: 0 10*3/MM3 (ref 0–0.05)
IMM GRANULOCYTES NFR BLD AUTO: 0 % (ref 0–0.5)
LYMPHOCYTES # BLD AUTO: 1.24 10*3/MM3 (ref 0.7–3.1)
LYMPHOCYTES NFR BLD AUTO: 29.6 % (ref 19.6–45.3)
MCH RBC QN AUTO: 33.7 PG (ref 26.6–33)
MCHC RBC AUTO-ENTMCNC: 33.2 G/DL (ref 31.5–35.7)
MCV RBC AUTO: 101.5 FL (ref 79–97)
MONOCYTES # BLD AUTO: 0.53 10*3/MM3 (ref 0.1–0.9)
MONOCYTES NFR BLD AUTO: 12.6 % (ref 5–12)
NEUTROPHILS NFR BLD AUTO: 2.16 10*3/MM3 (ref 1.7–7)
NEUTROPHILS NFR BLD AUTO: 51.6 % (ref 42.7–76)
NRBC BLD AUTO-RTO: 0 /100 WBC (ref 0–0.2)
PLATELET # BLD AUTO: 253 10*3/MM3 (ref 140–450)
PMV BLD AUTO: 10 FL (ref 6–12)
POTASSIUM SERPL-SCNC: 5.3 MMOL/L (ref 3.5–5.2)
PROT SERPL-MCNC: 6.6 G/DL (ref 6–8.5)
RBC # BLD AUTO: 3.92 10*6/MM3 (ref 3.77–5.28)
SODIUM SERPL-SCNC: 142 MMOL/L (ref 136–145)
WBC NRBC COR # BLD: 4.19 10*3/MM3 (ref 3.4–10.8)

## 2023-09-14 PROCEDURE — 80053 COMPREHEN METABOLIC PANEL: CPT

## 2023-09-14 PROCEDURE — 36415 COLL VENOUS BLD VENIPUNCTURE: CPT

## 2023-09-14 PROCEDURE — 85025 COMPLETE CBC W/AUTO DIFF WBC: CPT

## 2023-09-14 NOTE — LETTER
"September 14, 2023     Jazmine Chanel MD  [address] [id number]    Patient: Martha Davey   YOB: 1960   Date of Visit: 9/14/2023     Dear Jazmine Chanel MD:       Thank you for referring Martha Davey to me for evaluation. Below are the relevant portions of my assessment and plan of care.    If you have questions, please do not hesitate to call me. I look forward to following Martha along with you.         Sincerely,        Ubaldo Hancock MD        CC: She Brunner, Ubaldo Piña Jr., MD  09/14/23 [id number]  Sign when Signing Visit  Chief Complaint  Previous Stage Ib ([id number]) ER/CA positive, HER-2/peter negative right breast cancer with subsequent metastatic disease identified 10/8/2017, history of right pulmonary embolism, cancer related pain, chemotherapy-induced diarrhea, chemotherapy-induced mucositis, chemotherapy-induced hand-foot syndrome    Subjective        History of Present Illness  The patient returns today in delayed follow-up continuing on oral Xeloda 1000 mg in the morning, 1500 mg in the evening for 7 days on followed by 7 days off as well as monthly Xgeva and Eliquis 5 mg twice daily.  The patient initiated an \"on\" week of Xeloda yesterday.  She is due for Xgeva on 9/20/2023.  She is back today to review CT scan and bone scan results as well as results from CT-guided biopsy of right pubic ramus bone lesion performed on 8/31/2023.  Patient tolerated the biopsy well, no difficulties.  In the interval since her last visit, she notes that she has been somewhat more fatigued but feels that this is mainly related to emotional stress.  She has had considerable stress in dealing with her 's medical issues and malignancy in addition to her own.  Unfortunately he had a fall yesterday but did not experience any significant injury.  Patient notes that she may have somewhat more shoulder discomfort recently, symptoms that do wax and wane for her over " "time.  She also has some intermittent irritation in her left foot and continues routine follow-up with her podiatrist.  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  She did receive treatment for urinary tract infection with culture on 8/23/2023 that grew E. coli resistant to ampicillin and Bactrim, received 5-day course of Cipro with resolution of symptoms.  She continues follow-up with supportive oncology, last seen on 8/25/2023.      Objective   Vital Signs:   /84   Pulse 91   Temp 98.6 °F (37 °C) (Temporal)   Resp 18   Ht 154.9 cm (60.98\")   Wt 81.2 kg (179 lb 1.6 oz)   SpO2 97%   BMI 33.86 kg/m²     Physical Exam  Constitutional:       Appearance: She is well-developed.      Comments: Patient ambulates with the use of a cane   Eyes:      Conjunctiva/sclera: Conjunctivae normal.   Neck:      Thyroid: No thyromegaly.   Cardiovascular:      Rate and Rhythm: Normal rate and regular rhythm.      Heart sounds: No murmur heard.    No friction rub. No gallop.   Pulmonary:      Effort: No respiratory distress.      Breath sounds: Normal breath sounds.   Abdominal:      General: Bowel sounds are normal. There is no distension.      Palpations: Abdomen is soft.      Tenderness: There is no abdominal tenderness.   Musculoskeletal:      Comments: Braces in place in the bilateral lower extremities   Lymphadenopathy:      Head:      Right side of head: No submandibular adenopathy.      Cervical: No cervical adenopathy.      Upper Body:      Right upper body: No supraclavicular adenopathy.      Left upper body: No supraclavicular adenopathy.   Skin:     General: Skin is warm and dry.      Findings: Rash present.      Comments: Erythema involving the palms is currently minimal.  The degree of skin peeling is moderate, fairly stable.  There is minimal erythema " involving the dorsal aspect of the fingers.  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      Cranial Nerves: No cranial nerve deficit.      Motor: No abnormal muscle tone.      Deep Tendon Reflexes: Reflexes normal.   Psychiatric:         Behavior: Behavior normal.       Result Review : Reviewed CBC, CMP from today.  Reviewed results from CT-guided biopsy right pubic ramus from 8/31/2023.  Reviewed CT chest abdomen pelvis from 9/8/2023 and bone scan from 9/11/2023.     Assessment and Plan     *Previous Stage Ib ([id number]) right breast cancer:  Diagnosed May 2010 with excisional biopsy for invasive ductal carcinoma, 1.3 cm, grade 2, ER 90%, MN 80%, HER-2/peter negative (1+ IHC).    Subsequent right mastectomy in July 2010 with no residual breast malignancy, 1/5 sentinel lymph node with micrometastasis (0.25 mm).    Treated in the [facility] system with adjuvant AC ×4 cycles in 2010 (no taxanes administered due to underlying Charcot-Saloni-Tooth with peripheral neuropathy).    Adjuvant Femara (postmenopausal) initiated October 2010 with plan to treat ×10 years.    Genetic testing reportedly negative.    Developed osteopenia treated with Prolia beginning 2/27/13. Subsequently discontinued due to identification of metastatic disease.    *Recurrent/metastatic disease identified 10/8/17:  Disease involving thoracic spine with cord compression at T6, lumbosacral involvement, sternal and right sternoclavicular involvement.    Femara discontinued in 10/2017.    Radiation administered (in the [facility] system) to the thoracic spine beginning 10/19/17 treating T3-T9 to a dose of 24 gray in 6 fractions.  Evaluation with MRI 12/8/17 showing persistent T6 cord compression with persistent neurologic compromise requiring surgical treatment 12/11/17 with T6 laminectomy/corpectomy and T3-T9 fusion.  Pathology with metastatic  carcinoma of breast origin, ER negative, IN negative, HER-2/peter negative (1+ IHC).  Additional staging evaluation 12/8/17 with no evidence of visceral metastatic disease, bone scan showing involvement of thoracic spine, sternum, left humerus, mid frontal bone.  No plane film correlate of left humerus lesion.  MRI lumbar spine with small intradural L3 metastasis.  CA 15-3 12/6/17- 17.  Palliative radiation therapy to L3 dural metastasis and left humerus initiated 1/15/18 (10 fractions), completed 1/26/18.  Hypercalcemia of malignancy with calcium in the 10-11 range.  Initiation of monthly Xgeva 1/23/18.  Baseline CT scan 1/30/18 with no evidence of visceral involvement.  Cluster of nodular opacities in the right lower lobe suspected to be infectious or related to bronchiolitis. Bone scan 1/30/18 showed postsurgical change in the thoracic spine, stable uptake in the frontal bone, no new areas of disease.  Initiation of palliative oral single agent Xeloda 2/7/18 2000 mg a.m., 1500 mg p.m. for 14/21 days.   Following 3 cycles xeloda, bone scan 4/4/18 showed no change from the prior study.  CT scan 4/4/18 showed a small pericardial effusion of unclear significance as well as a subcutaneous nodule in the right lateral chest wall.  Subsequent evaluation with echocardiogram 4/17/18 showed no evidence of pericardial effusion.  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  We discussed the possibility of surgical excision of the right subcutaneous chest wall lesion for more definitive diagnosis.  Reviewed previous CT images dating back to 12/8/17 and the lesion was present even at that time measuring around 1.7 cm although not commented on in the radiology report.  As this appears to be an indolent low-grade process unrelated to her breast cancer, recommendee foregoing surgical  excision at this time and monitoring this area on future scans.  The patient agreed.    Following 6 cycles of Xeloda, CT 6/6/18  showed stable findings, no evidence of progressive disease.  There was a comment regarding subcutaneous abnormality in the anterior abdominal wall and this was related to Lovenox injection sites.  Bone scan 6/6/18 showed no interval change.   CT scan and bone scan 8/13/18 following 9 cycles of Xeloda showed stable findings with no evidence of significant visceral metastases.  Her bone lesions appear stable on bone scan.  The spindle cell neoplasm in her right chest wall actually decreased in size from 2 cm down to 1.6 cm.    The patient experienced some symptoms of diarrhea, anorexia, generalized weakness during cycle 9 Xeloda it was unclear whether this was related to a viral gastroenteritis or toxicity from treatment.  Symptoms recurred during cycle 10 and treatment was cut short by 2 days.  Symptoms attributed to Xeloda.  With cycle 11, dose and schedule altered to 1500 mg twice daily for 7 days on followed by 7 days off .  CT scan 9/9/2020 with no significant changes.  Bone scan 9/9/2020 read as unchanged from prior studies however did note an area of slight activity in the medial left femur.  Contacted radiology and although this was not noted on prior reports appears to have been present.  Subsequent MRI left femur 9/21/2020 with cortical thickening and periosteal edema left iliac us muscle insertion to the medial left femur with no evidence of metastatic disease, favored to represent periosteal change secondary to insertional tendinitis.  Severe hand-foot symptoms causing sclerodactyly and limitation in finger movement prompted change in dosing in July 2021 with Xeloda decreased to 1000 mg a.m., 1500 mg p.m. for 7 days on followed by 7 days off.  Patient was experiencing severe hand-foot syndrome related to Xeloda having developed skin peeling, sclerodactyly with limited use of her  hands.  On 3/31/2022, Xeloda was held to allow for recovery.  Patient resumed Xeloda on 4/18/2022.  Patient required hospitalization 5/29 - 6/1/2022 with presumed viral gastroenteritis that was exacerbated by Xeloda.  Xeloda held briefly, resumed on 6/6/2022.  Patient again with worsening sclerodactyly, Xeloda held for 2 weeks from 10/3 - 10/17/2022, resumed at prior dose with improvement in symptoms.  Scans on 3/3/2023 with CT chest abdomen pelvis and bone scan showing stable findings.  CT chest abdomen pelvis 7/3/2023 with questionable 1 mm change in size right lower lobe nodule.  Additional small pulmonary nodules stable.  Stable bony metastatic disease.  Bone scan on 7/7/2023 however showed slight progression focal involvement right ischium and superior pubic ramus (new ischial lesion superior pubic ramus compared to 10/24/2022 and more conspicuous compared to 3/3/2023).  Metastatic lesion right inferior pubic ramus and ischial tuberosity increased in size since October 2022 however stable from 3/3/2023.  MRI cervical spine 7/3/2023 with no metastatic involvement however identification of the lesion at T2, recommended dedicated thoracic spine MRI to evaluate further.  Given the minimal extent and slow degree of progression, elected to continue oral Xeloda and evaluate further with MRI pelvis and thoracic spine.  Consideration of biopsy pelvic metastasis for repeat ER/IA/HER2/peter testing.    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  CA 15-3 on 7/10/2023 was 12.2, uninformative.  MRI thoracic spine 7/27/2023 with 1 cm metastatic focus seen in L1  MRI pelvis 7/27/2023 with metastatic foci identified right superior pubic ramus, right ischial tuberosity, inferior pubic ramus, L5 body.  Chronic appearing posttraumatic and/or degenerative change in the posterior right ilium adjacent SI joint.  CT-guided biopsy right pubic ramus lesion on 8/31/2023.  Pathology  "showed no evidence of malignancy, showed markedly calcified and sclerotic mature lamellar bone.  Attempted decalcification but no evidence of malignancy.  CT chest abdomen pelvis 9/8/2023 and bone scan 9/11/2023 with stable findings.  The patient returns today continuing on treatment with Xeloda 1000 mg a.m., 1500 mg p.m. 7 days on followed by 7 days off.  She also continues on monthly Xgeva, due next on 9/20/2023.  Yesterday she started an \"on\" week of Xeloda.  She is tolerating treatment reasonably well, hand-foot symptoms are fairly stable currently.  We reviewed her recent CT biopsy of the right pubic ramus lesion.  This showed no evidence of malignancy, did show markedly calcified and sclerotic bone which may represent a treated metastasis.  Unclear whether this is a false negative result based on sampling error.  We did review her scans as noted above with CT chest abdomen pelvis 9/8/2023 and bone scan 9/11/2023 that show stable findings.  She is asymptomatic from her disease currently.  Unclear whether the changes seen on her previous bone scan were artifactual.  Without definitive evidence of further progression, I have recommended that we maintain her current treatment and the patient agrees.  We will plan to repeat scans at a 3-month interval.  In the meantime she will return for Xgeva on 9/20/2023 and 4 weeks later will have NP visit with Xgeva.  I will see her in 8 weeks when she is due for Xgeva and again in 12 weeks and just prior to that visit we will repeat CT scans and bone scan.    *Right pulmonary embolism:  Diagnosed on CT angiogram 10/21/17 involving small right lower lobe pulmonary artery.  Lower extremity Dopplers negative.  Bilateral lower extremity Dopplers negative again 12/5/17.  Received chronic Lovenox 1 mg/kg twice per day, transitioned to oral Eliquis in February 2019, continuing on Eliquis 5 mg twice daily.  Patient continues on Eliquis 5 mg twice daily    *Cancer related " pain:  Previously receiving Duragesic 50 µg patch every 72 hours along with Dilaudid 4 mg as needed for breakthrough pain  The patient's pain improved over time and she was able to discontinue both Duragesic and Dilaudid in the interval.  Patient does take occasional Flexeril at bedtime due to back spasm/pain when she has been more active.  The patient does have some occasional aggravation of her chronic back pain and does use tramadol 50 mg every 8 hours as needed  Patient was receiving tramadol 50 mg every 8-12 hours as needed in addition to hydrocodone 5/325 every 4 hours as needed.  She was also taking OTC NSAIDs.  Patient developed nausea related to hydrocodone and was transitioned to Percocet 5/325 every 4 hours as needed, advised to stop taking NSAIDs with ongoing anticoagulation.  Patient notes that the pain is currently under fair control.    *Chemotherapy-induced diarrhea with subsequent C. difficile colitis in the setting of previous ulcerative colitis and then identification of enteropathogenic E. coli:  Patient with reported history of ulcerative colitis, continues on mesalamine.  The patient developed initial diarrhea related to Xeloda at regular dosing.  Symptoms improved on reduced dose Xeloda  Flare of symptoms in October 2018 with apparent finding of C. difficile colitis by GI, treated with course of oral vancomycin with improvement in symptoms.  Patient notes minimal intermittent diarrhea/loose stools on Xeloda requiring occasional dosing of Imodium.    Patient required hospitalization 5/29 - 6/1/2022 with presumed viral gastroenteritis that was exacerbated by Xeloda.  Xeloda held briefly, resumed on 6/6/2022.  Patient's  developed C. difficile colitis and patient was experiencing increase in diarrhea.  Stool studies performed 8/4/22 negative C. difficile however GI PCR was positive for enteropathogenic E. coli.  Given patient's symptoms and immunocompromise status it was elected to treat  her with azithromycin 500 mg daily x3 days beginning 8/5/2022  Diarrhea resolved  Patient underwent colonoscopy on 2/28/2023 with findings of diverticulosis    *Traumatic left tibia/fibular fracture:  Status post ORIF 12/6/17  Specimen was sent for pathologic review, negative for evidence of malignancy    *Hypercalcemia:  Suspect hypercalcemia of malignancy, calcium in  10-11 range previously.  Calcium normalized following initiation of monthly Xgeva on 1/23/18.    *Chemotherapy-induced mucositis:  Patient had a minimal degree of mucositis with cycle 2.  The patient has magic mouthwash to use as needed.  No subsequent mucositis.    *Recurrent UTI, bladder wall thickening on CT:  Patient had an enterococcal UTI on 3/2/18 sensitive to nitrofurantoin and received treatment, unclear how long.  Recurrent UTI 3/20/18 with urine culture growing Klebsiella, initially treated with nitrofurantoin, transitioned to Levaquin.  CT 4/4/18 with diffuse bladder wall thickening with increased nodular thickening at the left base.  Referral to urogynecology Dr. May Johnson.  She was placed on a prophylactic dose of trimethoprim 100 mg daily, bladder wall thickening felt to be related to recent recurrent urinary tract infections.  Patient with urinary symptoms, treated with course of Macrobid at the end of December 2018, urine culture however was negative 12/31/18.  Patient was found to have Klebsiella UTI 7/29/2019 which was successfully treated with Macrobid with complete resolution of symptoms.  CT 8/10/2021 with diffuse bladder wall thickening new from 5/17/2021 (however seen on multiple prior scans).    UTI on 10/18/2021 with E. coli greater than 100,000 colonies that was pansensitive, treated with Macrobid x7 days  With ongoing/recurrent UTIs patient was seen by urogynecology and initiated suppressive therapy with trimethoprim 100 mg daily in December 2021.  She has not experienced any further urinary tract infections.  CT abdomen  pelvis 3/28/2022 does show diffuse thickening of urinary bladder as has been seen on prior studies indicative of cystitis.  Patient notes that she did stop taking trimethoprim on a daily basis.    Patient with urine culture on 5/5/2023 that grew E. coli and she received antibiotic treatment from urogynecology.    Urine culture on 8/23/2023 with greater than 100,000 colonies of E. coli resistant to ampicillin and Bactrim.  Received 5-day course of Cipro with resolution of symptoms.    *Mobility:  The patient underwent an intensive course of rehabilitation at [facility].  She graduated from her outpatient course November 2018.  Overall the patient has improved dramatically in terms of mobility,   Patient developed significant callus formation lateral aspect of the left plantar surface.  She did meet with her brace specialist and additional padding was added in this area.  She continues to follow-up with her podiatrist Dr. Ware.    *Depression:  The patient is continuing follow-up in the supportive oncology clinic and is currently continuing on Cymbalta 30 mg twice daily as well as gabapentin 600 mg nightly, temazepam 15 mg nightly as needed, Provigil 100 mg daily.  Patient does continue to attend support groups at [employer] [facility].  The patient does continue routine follow-up in supportive oncology clinic.    Patient does have multiple stressors with her 's malignancy and chemotherapy as well as her autistic son who is living with them at home.  Patient continues to have difficulty with stress and anxiety, is being followed by supportive oncology, last seen on 8/25/2023.    *Hand-foot syndrome secondary to Xeloda:  Patient continues with frequent application of emollient cream to the hands and feet  Symptoms increased significantly requiring a 1 week delay in cycle 18 Xeloda as noted above.  Symptoms did improve and she continued on the same dose.    Patient was referred to dermatology and has been continuing on  triamcinolone 0.1% cream used for 1 week on followed by 1 week off which led to some further improvement.   Progressive palmar erythema with development of sclerodactyly and effect on dexterity.  Addition of urea-based cream 3 times daily.  Patient with continued difficulty regarding erythema of her hands that extended onto the dorsal aspect and was causing contractures/sclerodactyly in her fingers affecting her dexterity.  In July 2021, held Xeloda for an additional week and then reduced dose from 1500 mg twice daily down to 1000 mg a.m. and 1500 mg p.m. and continued on a 7-day on followed by 7-day off schedule.  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  On 3/31/2022, Xeloda was held to allow for recovery.  Patient resumed Xeloda on 4/18/2022.  Patient developed worsening sclerodactyly affecting dexterity that required Xeloda to again be briefly held for 2 weeks from 10/3 - 10/17/2022.  Treatment resumed at prior dose after improvement in symptoms.  Patient continues to use emollient cream frequently (currently 5 times daily) and topical triamcinolone 0.1% twice daily intermittently (2 weeks on followed by 2 weeks off as instructed by dermatology).  Symptoms currently stable with mild erythema of the palms of the hands and decrease and skin peeling.  Minimal erythema currently on the dorsal aspect of the fingers.  Sclerodactyly and effect on dexterity stable.    *Evidence of steatosis on scans with mild intermittent elevated liver function studies:  Liver function studies increased 8/20/19 with ALT 98, AST 70, normal total bilirubin.  Negative viral hepatitis A, B, and C panel 8/23/18  Likely related to hepatic steatosis.   Subsequent improvement in LFTs  LFTs today are  normal    *Chemotherapy induced leukopenia:  WBC today 4.19 with normal differential    *GERD, question of celiac disease:  Patient with significant history of reflux  Patient currently receiving Protonix 40 mg daily daily and Carafate 1 g 4 times daily as needed  Patient required hospitalization 5/29 - 6/1/2022 with presumed viral gastroenteritis that was exacerbated by Xeloda.    EGD performed 5/31/2022 during hospitalization with biopsy that showed focal blunting of villi and atrophy with slight increase in intraepithelial lymphocytes.  Changes were minimal but recommended correlation with serology to exclude celiac disease.  Celiac serology 8/8/2022 negative  Patient previously developed worsening reflux symptoms, temporarily increase Protonix to 40 mg twice daily and we did add Carafate 1 g 4 times daily.    She is taking Protonix 40 mg once daily, is not taking Carafate.    *Insomnia:  Patient with prior paradoxical reaction to Benadryl  Improved previously on temazepam 15 mg nightly as needed.   Patient noted subsequently that temazepam was having no effect.   Patient increased gabapentin to 900 mg nightly, discontinued temazepam    *Health maintenance:  Patient notes that she has a history of colon polyps as well as ulcerative colitis and was due for a follow-up colonoscopy on 9/12/2019.  We did discuss there is no necessity to pursue colonoscopy in the setting of her metastatic breast cancer.    The patient did undergo colonoscopy on 2/7/2020 with findings of muscular hypertrophy and diverticulosis.   Patient did wish to proceed with further colonoscopic evaluation, performed on 12/20/2022 with poor prep.  Repeat 2/28/2023 with diverticulosis.    *Bilateral shoulder pain:  Chronic difficulty with right shoulder although she has not complained of this in prior visits to our office.  She reported difficulty with abduction.    The patient did undergo MRI of her right shoulder on 11/21/2019 at an outside  facility showing multiple abnormalities including supraspinatus tendinosis, labral tear but no evidence of metastatic disease.  Patient developed pain in the left shoulder, was seen by orthopedics and underwent steroid injection with some improvement.  Right shoulder stable.  Patient did undergo physical therapy with some improvement.  She continues to use tramadol 50 mg every 8-12 hours as needed.  Note that current CT 10/20/2022 made notation of left shoulder probable bursitis.    Patient continues with bilateral shoulder pain, left greater than right which does wax and wane.    *Ocular changes in part related to Xeloda:  Patient experienced a mild degree of blurred vision as well as burning and pruritus.  She was seen by her ophthalmologist and was placed on xiidra ophthalmic drops which have helped.  Likely both issues are to some extent related to Xeloda.  Symptoms did worsen and patient was seen by ophthalmologist at [facility].  She is now using an ocular lubricant at night in addition to artificial tears which have helped.  Symptoms currently stable to improved    *Elevated glucose:  It is noted the patient's glucose at the last few visits has been in the high 100 range, postprandial.  She has had a hemoglobin A1c that has been in the high 5-6 range in the past, on 5/1/2019 at 5.7  Hemoglobin A1c was last checked in 11/12/2021 at 5.8    *Possible loosening of pedicle screw at T3:  CT cervical spine 5/17/2021 showed loosening of the left pedicle screw at T3.  Patient referred to orthopedics and was seen by Dr. Nayak who felt that there were no concerning findings on the scan    *Iron deficiency anemia  Labs on 5/2/2022 with hemoglobin 10.8.  Additional labs with iron 48, ferritin 21.2, iron saturation 11%, TIBC 4 and 32, folate greater than 20, B12 greater than 2000.    Attempted treatment with oral iron daily however patient was intolerant  Patient subsequently received Venofer 300 mg weekly  "x4 beginning 6/6/2022 and completed on 6/27/2022  Subsequent iron studies on 7/11/2022 showed improvement with iron 62, ferritin 345, iron saturation 18%, TIBC 336.  Hemoglobin had improved up to 12.7 at that time.  Repeat iron studies on 10/3/2022 showed iron replete status with hemoglobin 13.7, iron 121, ferritin 208.7, iron saturation 31%, TIBC 392.  Hemoglobin currently normal at 13.2     Plan:  Continue palliative single agent Xeloda 1000 mg a.m., 1500 mg in the p.m. 7 days on followed by 7 days off (patient initiated a \"on\" week of treatment yesterday)  Continue monthly Xgeva, due next on 9/20/2023  Continue Eliquis 5 mg twice daily  The patient will continue frequent use of emollient cream currently 5 times daily and continue periodic triamcinolone cream 0.1% twice daily (provided by dermatology) 2 weeks on followed by 2 weeks off as prescribed  Continue Protonix 40 mg daily  Continue ocular lubricating gel nightly per ophthalmology  Continue Provigil 100 mg daily and Cymbalta 30 mg twice daily.  Patient continues routine follow-up with supportive oncology   Patient will continue gabapentin 900 mg at night.   Patient continues on tramadol 50 mg every 8 hours as needed for pain  Continue Percocet 5/325 every 4 hours as needed for pain (reports nausea from hydrocodone she received previously)  The patient continues follow-up with her podiatrist and brace specialist regarding callus formation and scaling in the lateral left plantar surface  On 10/18/2023 NP visit with CBC, CMP, magnesium, phosphorus and Xgeva.  On 11/15/2023 MD visit with CBC, CMP, magnesium, phosphorus and Xgeva.  On 12/13/2023 MD visit with CBC, CMP, magnesium, phosphorus and Xgeva.  1 week prior CT chest abdomen pelvis and bone scan.     Patient continuing on high risk medication requiring intensive monitoring.       I did spend 45 minutes in total time caring for this patient today, time spent in review of records, face-to-face consultation, " coordination of care, placement of orders, completion of documentation.

## 2023-09-15 RX ORDER — GABAPENTIN 300 MG/1
CAPSULE ORAL
Qty: 180 CAPSULE | Refills: 3 | Status: SHIPPED | OUTPATIENT
Start: 2023-09-15

## 2023-09-19 DIAGNOSIS — G89.3 CANCER ASSOCIATED PAIN: ICD-10-CM

## 2023-09-19 RX ORDER — TRAMADOL HYDROCHLORIDE 50 MG/1
TABLET ORAL
Qty: 90 TABLET | Refills: 0 | Status: SHIPPED | OUTPATIENT
Start: 2023-09-19

## 2023-09-20 ENCOUNTER — LAB (OUTPATIENT)
Dept: OTHER | Facility: HOSPITAL | Age: 63
End: 2023-09-20
Payer: MEDICARE

## 2023-09-20 ENCOUNTER — INFUSION (OUTPATIENT)
Dept: ONCOLOGY | Facility: HOSPITAL | Age: 63
End: 2023-09-20
Payer: MEDICARE

## 2023-09-20 ENCOUNTER — APPOINTMENT (OUTPATIENT)
Dept: LAB | Facility: HOSPITAL | Age: 63
End: 2023-09-20
Payer: MEDICARE

## 2023-09-20 DIAGNOSIS — Z17.1 MALIGNANT NEOPLASM OF OVERLAPPING SITES OF RIGHT BREAST IN FEMALE, ESTROGEN RECEPTOR NEGATIVE: Primary | ICD-10-CM

## 2023-09-20 DIAGNOSIS — D50.8 OTHER IRON DEFICIENCY ANEMIA: Primary | ICD-10-CM

## 2023-09-20 DIAGNOSIS — C50.811 MALIGNANT NEOPLASM OF OVERLAPPING SITES OF RIGHT BREAST IN FEMALE, ESTROGEN RECEPTOR NEGATIVE: Primary | ICD-10-CM

## 2023-09-20 DIAGNOSIS — C50.811 MALIGNANT NEOPLASM OF OVERLAPPING SITES OF RIGHT BREAST IN FEMALE, ESTROGEN RECEPTOR NEGATIVE: ICD-10-CM

## 2023-09-20 DIAGNOSIS — Z79.899 HIGH RISK MEDICATION USE: ICD-10-CM

## 2023-09-20 DIAGNOSIS — Z17.1 MALIGNANT NEOPLASM OF OVERLAPPING SITES OF RIGHT BREAST IN FEMALE, ESTROGEN RECEPTOR NEGATIVE: ICD-10-CM

## 2023-09-20 LAB
ALBUMIN SERPL-MCNC: 4.2 G/DL (ref 3.5–5.2)
ALBUMIN/GLOB SERPL: 1.8 G/DL
ALP SERPL-CCNC: 59 U/L (ref 39–117)
ALT SERPL W P-5'-P-CCNC: 18 U/L (ref 1–33)
ANION GAP SERPL CALCULATED.3IONS-SCNC: 9.1 MMOL/L (ref 5–15)
AST SERPL-CCNC: 24 U/L (ref 1–32)
BASOPHILS # BLD AUTO: 0.04 10*3/MM3 (ref 0–0.2)
BASOPHILS NFR BLD AUTO: 0.9 % (ref 0–1.5)
BILIRUB SERPL-MCNC: 0.5 MG/DL (ref 0–1.2)
BUN SERPL-MCNC: 28 MG/DL (ref 8–23)
BUN/CREAT SERPL: 34.1 (ref 7–25)
CALCIUM SPEC-SCNC: 9.5 MG/DL (ref 8.6–10.5)
CHLORIDE SERPL-SCNC: 101 MMOL/L (ref 98–107)
CO2 SERPL-SCNC: 27.9 MMOL/L (ref 22–29)
CREAT SERPL-MCNC: 0.82 MG/DL (ref 0.57–1)
DEPRECATED RDW RBC AUTO: 57 FL (ref 37–54)
EGFRCR SERPLBLD CKD-EPI 2021: 81 ML/MIN/1.73
EOSINOPHIL # BLD AUTO: 0.15 10*3/MM3 (ref 0–0.4)
EOSINOPHIL NFR BLD AUTO: 3.2 % (ref 0.3–6.2)
ERYTHROCYTE [DISTWIDTH] IN BLOOD BY AUTOMATED COUNT: 15.9 % (ref 12.3–15.4)
GLOBULIN UR ELPH-MCNC: 2.4 GM/DL
GLUCOSE SERPL-MCNC: 106 MG/DL (ref 65–99)
HCT VFR BLD AUTO: 38.1 % (ref 34–46.6)
HGB BLD-MCNC: 12.6 G/DL (ref 12–15.9)
IMM GRANULOCYTES # BLD AUTO: 0.05 10*3/MM3 (ref 0–0.05)
IMM GRANULOCYTES NFR BLD AUTO: 1.1 % (ref 0–0.5)
LYMPHOCYTES # BLD AUTO: 1.07 10*3/MM3 (ref 0.7–3.1)
LYMPHOCYTES NFR BLD AUTO: 22.9 % (ref 19.6–45.3)
MAGNESIUM SERPL-MCNC: 1.9 MG/DL (ref 1.6–2.4)
MCH RBC QN AUTO: 32.7 PG (ref 26.6–33)
MCHC RBC AUTO-ENTMCNC: 33.1 G/DL (ref 31.5–35.7)
MCV RBC AUTO: 99 FL (ref 79–97)
MONOCYTES # BLD AUTO: 0.44 10*3/MM3 (ref 0.1–0.9)
MONOCYTES NFR BLD AUTO: 9.4 % (ref 5–12)
NEUTROPHILS NFR BLD AUTO: 2.93 10*3/MM3 (ref 1.7–7)
NEUTROPHILS NFR BLD AUTO: 62.5 % (ref 42.7–76)
NRBC BLD AUTO-RTO: 0 /100 WBC (ref 0–0.2)
PHOSPHATE SERPL-MCNC: 3.5 MG/DL (ref 2.5–4.5)
PLATELET # BLD AUTO: 225 10*3/MM3 (ref 140–450)
PMV BLD AUTO: 11.2 FL (ref 6–12)
POTASSIUM SERPL-SCNC: 4.7 MMOL/L (ref 3.5–5.2)
PROT SERPL-MCNC: 6.6 G/DL (ref 6–8.5)
RBC # BLD AUTO: 3.85 10*6/MM3 (ref 3.77–5.28)
SODIUM SERPL-SCNC: 138 MMOL/L (ref 136–145)
T4 FREE SERPL-MCNC: 1.2 NG/DL (ref 0.93–1.7)
TSH SERPL DL<=0.05 MIU/L-ACNC: 4.45 UIU/ML (ref 0.27–4.2)
VIT B12 BLD-MCNC: 938 PG/ML (ref 211–946)
WBC NRBC COR # BLD: 4.68 10*3/MM3 (ref 3.4–10.8)

## 2023-09-20 PROCEDURE — 96372 THER/PROPH/DIAG INJ SC/IM: CPT

## 2023-09-20 PROCEDURE — 84100 ASSAY OF PHOSPHORUS: CPT | Performed by: INTERNAL MEDICINE

## 2023-09-20 PROCEDURE — 82607 VITAMIN B-12: CPT

## 2023-09-20 PROCEDURE — 83735 ASSAY OF MAGNESIUM: CPT | Performed by: INTERNAL MEDICINE

## 2023-09-20 PROCEDURE — 84439 ASSAY OF FREE THYROXINE: CPT | Performed by: INTERNAL MEDICINE

## 2023-09-20 PROCEDURE — 85025 COMPLETE CBC W/AUTO DIFF WBC: CPT | Performed by: INTERNAL MEDICINE

## 2023-09-20 PROCEDURE — 84443 ASSAY THYROID STIM HORMONE: CPT | Performed by: INTERNAL MEDICINE

## 2023-09-20 PROCEDURE — 36415 COLL VENOUS BLD VENIPUNCTURE: CPT

## 2023-09-20 PROCEDURE — 80053 COMPREHEN METABOLIC PANEL: CPT | Performed by: INTERNAL MEDICINE

## 2023-09-20 PROCEDURE — 25010000002 DENOSUMAB 120 MG/1.7ML SOLUTION: Performed by: INTERNAL MEDICINE

## 2023-09-20 RX ADMIN — DENOSUMAB 120 MG: 120 INJECTION SUBCUTANEOUS at 12:19

## 2023-09-20 NOTE — NURSING NOTE
Pt arrived for Xgeva injection with complaints of fatigue and low energy x 4 weeks, message sent to Dr. Hancock to see if he would like any additional labs drawn, and to ask him to review her labs from today. Reviewed labs with pt, her BUN and Bun/Creatinine ratio are elevated, encouraged her to stay well hydrated, she v/u. Xgeva administered in left arm without incidence. Reviewed next f/u appointment and instructed to pt to call sooner with any questions or concerns. Pt v/u and was discharged in stable condition.

## 2023-09-25 ENCOUNTER — TELEMEDICINE (OUTPATIENT)
Dept: PSYCHIATRY | Facility: CLINIC | Age: 63
End: 2023-09-25

## 2023-09-25 DIAGNOSIS — F41.1 GENERALIZED ANXIETY DISORDER: Primary | ICD-10-CM

## 2023-09-25 PROCEDURE — 90834 PSYTX W PT 45 MINUTES: CPT | Performed by: COUNSELOR

## 2023-09-25 NOTE — PROGRESS NOTES
Date: September 25, 2023  Time In: 1500  Time Out: 1547  This provider is located at home address for [facility] (through [facility]), [address], [place] [address] using a secure [employer] Video Visit through [employer]. Patient is being seen remotely via telehealth at home address in [place] and stated they are in a secure environment for this session. The patient's condition being diagnosed/treated is appropriate for telemedicine. The provider identified herself as well as her credentials. The patient, and/or patients guardian, consent to be seen remotely, and when consent is given they understand that the consent allows for patient identifiable information to be sent to a third party as needed. They may refuse to be seen remotely at any time. The electronic data is encrypted and password protected, and the patient and/or guardian has been advised of the potential risks to privacy not withstanding such measures.     You have chosen to receive care through a telehealth visit.  Do you consent to use a video/audio connection for your medical care today? Yes    PROGRESS NOTE  Data:  Martha Davey is a 62 y.o. female who presents today for follow up    Chief Complaint: anxiety     History of Present Illness: Pt discussed increased anxiety due to current health status of her  and his tendency to have a negative perspective and the lack of support and assistance provided by brother regarding their mother's care. Pt reports that she tries to be upbeat with  but finds that at times his negative perspective drains her. Pt reports that she has debated on reaching out to her brother but has asked that he communicates with her via text message due to his inability to be respectful to her over the phone.       Clinical Maneuvering/Intervention:    (Scales based on 0 - 10 with 10 being the worst)  Depression: 3 Anxiety: 5       Assisted patient in processing  above session content; acknowledged and normalized patient’s thoughts, feelings, and concerns.  Rationalized patient thought process regarding recent stressors and life events. Discussed triggers associated with patient's emotions. Also discussed coping skills for patient to implement. Discussed boundaries and holding brother accountable. Discussed different timelines regarding people's way of processing and grieving.     Allowed patient to freely discuss issues without interruption or judgment. Provided safe, confidential environment to facilitate the development of positive therapeutic relationship and encourage open, honest communication. Assisted patient in identifying risk factors which would indicate the need for higher level of care including thoughts to harm self or others and/or self-harming behavior and encouraged patient to contact this office, call 911, or present to the nearest emergency room should any of these events occur. Discussed crisis intervention services and means to access. Patient adamantly and convincingly denies current suicidal or homicidal ideation or perceptual disturbance.    Assessment:   Assessment   Patient appears to maintain relative stability as compared to their baseline.  However, patient continues to struggle with anxiety which continues to cause impairment in important areas of functioning.  A result, they can be reasonably expected to continue to benefit from treatment and would likely be at increased risk for decompensation otherwise.    Mental Status Exam:   Hygiene:   good  Cooperation:  Cooperative  Eye Contact:  Good  Psychomotor Behavior:  Appropriate  Affect:  Appropriate  Mood: anxious  Speech:  Normal  Thought Process:  Linear  Thought Content:  Mood congruent  Suicidal:  None  Homicidal:  None  Hallucinations:  None  Delusion:  None  Memory:  Intact  Orientation:  Person, Place, Time and Situation  Reliability:  fair  Insight:  Fair  Judgement:  Fair  Impulse  Control:  Fair  Physical/Medical Issues:  No        Patient's Support Network Includes:      Functional Status: Mild impairment     Progress toward goal: Not at goal    Prognosis: Fair with Ongoing Treatment            Plan:    Patient will continue in individual outpatient therapy with focus on improved functioning and coping skills, maintaining stability, and avoiding decompensation and the need for higher level of care.    Patient will adhere to medication regimen as prescribed and report any side effects. Patient will contact this office, call 911 or present to the nearest emergency room should suicidal or homicidal ideations occur. Provide Cognitive Behavioral Therapy and Solution Focused Therapy to improve functioning, maintain stability, and avoid decompensation and the need for higher level of care.     Return in about 3 weeks, or earlier if symptoms worsen or fail to improve.           VISIT DIAGNOSIS:     ICD-10-CM ICD-9-CM   1. Generalized anxiety disorder  F41.1 300.02        Diagnoses and all orders for this visit:    1. Generalized anxiety disorder (Primary)           [facility] No Show Policy:  We understand unexpected circumstances arise; however, anytime you miss your appointment we are unable to provide you appropriate care.  In addition, each appointment missed could have been used to provide care for others.  We ask that you call at least 24 hours in advance to cancel or reschedule an appointment.  We would like to take this opportunity to remind you of our policy stating patients who miss THREE or more appointments without cancelling or rescheduling 24 hours in advance of the appointment may be subject to cancellation of any further visits with our clinic and recommendation to seek in-person services/visits.    Please call [phone] to reschedule your appointment. If there are reasons that make it difficult for you to keep the appointments, please call and let  us know how we can help.  Please understand that medication prescribing will not continue without seeing your provider.      [facility]'s No Show Policy reviewed with patient at today's visit. Patient verbalized understanding of this policy. Discussed with patient that in the event that there are three or more no show visits, it will be recommended that they pursue in-person services/visits as noncompliance with telehealth visits indicates that patient is not an appropriate candidate for telemedicine and would likely be more appropriate for in-person services/visits. Patient verbalizes understanding and is agreeable to this.        This document has been electronically signed by Sophia Barron LCSW  September 25, 2023 16:15 EDT      Part of this note may be an electronic transcription/translation of spoken language to printed text using the Dragon Dictation System.

## 2023-09-26 RX ORDER — GABAPENTIN 300 MG/1
300 CAPSULE ORAL 3 TIMES DAILY
Qty: 270 CAPSULE | Refills: 0 | Status: SHIPPED | OUTPATIENT
Start: 2023-09-26

## 2023-10-05 ENCOUNTER — TELEPHONE (OUTPATIENT)
Dept: INTERNAL MEDICINE | Facility: CLINIC | Age: 63
End: 2023-10-05
Payer: MEDICARE

## 2023-10-05 NOTE — TELEPHONE ENCOUNTER
Pt is still on a antibiotic from 10/2/2023  She is waiting on culture to make sure she is on the right antibiotic  LM for her to have them fax to you if I can not get it.  She will call Friday to be worked in if she is not feeling better

## 2023-10-05 NOTE — TELEPHONE ENCOUNTER
Caller: Martha Davey    Relationship to patient: Self    Best call back number: [phone]    Patient is needing: PATIENT WANTED TO LET JAMES KNOW THAT SHE CANCELLED HER APPOINTMENT FOR TODAY AND SHE WOULD LIKE HER TO CALL HER WHEN THE CULTURE COMES BACK.

## 2023-10-06 ENCOUNTER — OFFICE VISIT (OUTPATIENT)
Dept: PSYCHIATRY | Facility: HOSPITAL | Age: 63
End: 2023-10-06
Payer: MEDICARE

## 2023-10-06 DIAGNOSIS — R53.0 NEOPLASTIC MALIGNANT RELATED FATIGUE: ICD-10-CM

## 2023-10-06 DIAGNOSIS — C50.919 PRIMARY MALIGNANT NEOPLASM OF BREAST WITH METASTASIS: ICD-10-CM

## 2023-10-06 DIAGNOSIS — F33.1 MAJOR DEPRESSIVE DISORDER, RECURRENT EPISODE, MODERATE: ICD-10-CM

## 2023-10-06 DIAGNOSIS — F41.1 GENERALIZED ANXIETY DISORDER: Primary | ICD-10-CM

## 2023-10-06 DIAGNOSIS — G47.33 OSA (OBSTRUCTIVE SLEEP APNEA): ICD-10-CM

## 2023-10-06 RX ORDER — NITROFURANTOIN 25; 75 MG/1; MG/1
100 CAPSULE ORAL 2 TIMES DAILY
Qty: 14 CAPSULE | Refills: 0 | Status: SHIPPED | OUTPATIENT
Start: 2023-10-06

## 2023-10-06 RX ORDER — SACCHAROMYCES BOULARDII 250 MG
250 CAPSULE ORAL 2 TIMES DAILY
Qty: 20 CAPSULE | Refills: 0 | Status: SHIPPED | OUTPATIENT
Start: 2023-10-06

## 2023-10-06 NOTE — PROGRESS NOTES
[facility]  In Person Session    Subjective  Patient ID: Martha Davey is a 62 y.o. female is seen face to face in the [facility].    CC: Anxiety, depression, fatigue, sleep disruption    HPI/ Interval History:   Pt is seen alongside  with shared permission.    Continues to endorse tremendous anxiety alongside personal cancer, 's cancer, and caregiver responsibilities.     Anxiety and mood remain complicated. Continues on cymbalta, assisting with stability. Sees therapist regularly.     Sleep is appropriate at times, disrupted at others. Reports discontinuing temazepam, continuing on gabapentin. Reports 8 hour at times, although other times with ruminative worry impacting ability to both fall and stay asleep. Remains off CPAP, hx diagnosis of MARIA M. Return to sleep medicine discussed; appointment is scheduled for November. Continues to take provigil, beneficial to daytime fatigue.      Pleasurable engagement reviewed, specifically goal of going to [employer]. Pt and  have reconnected with previous [employer], appreciating someone bringing a meal and communion. Are planning to attend cookout tomorrow with [employer].    Exam: As above    Recent Labs Reviewed:  Admission on 10/02/2023, Discharged on 10/02/2023   Component Date Value    Color 10/02/2023 Yellow     Clarity, UA 10/02/2023 Cloudy (A)     Glucose, UA 10/02/2023 Negative     Bilirubin 10/02/2023 Negative     Ketones, UA 10/02/2023 Negative     Specific Gravity  10/02/2023 1.025     Blood, UA 10/02/2023 3+ (A)     pH, Urine 10/02/2023 5.5     Protein, POC 10/02/2023 3+ (A)     Urobilinogen, UA 10/02/2023 Normal     Nitrite, UA 10/02/2023 Positive (A)     Leukocytes 10/02/2023 Large (3+) (A)     Urine Culture 10/02/2023 Preliminary report (A)     Result 1 10/02/2023 Gram negative rods (A)    Lab on 09/20/2023   Component Date Value    Glucose 09/20/2023 106 (H)     BUN 09/20/2023 28 (H)      Creatinine 09/20/2023 0.82     Sodium 09/20/2023 138     Potassium 09/20/2023 4.7     Chloride 09/20/2023 101     CO2 09/20/2023 27.9     Calcium 09/20/2023 9.5     Total Protein 09/20/2023 6.6     Albumin 09/20/2023 4.2     ALT (SGPT) 09/20/2023 18     AST (SGOT) 09/20/2023 24     Alkaline Phosphatase 09/20/2023 59     Total Bilirubin 09/20/2023 0.5     Globulin 09/20/2023 2.4     A/G Ratio 09/20/2023 1.8     BUN/Creatinine Ratio 09/20/2023 34.1 (H)     Anion Gap 09/20/2023 9.1     eGFR 09/20/2023 81.0     Magnesium 09/20/2023 1.9     Phosphorus 09/20/2023 3.5     WBC 09/20/2023 4.68     RBC 09/20/2023 3.85     Hemoglobin 09/20/2023 12.6     Hematocrit 09/20/2023 38.1     MCV 09/20/2023 99.0 (H)     MCH 09/20/2023 32.7     MCHC 09/20/2023 33.1     RDW 09/20/2023 15.9 (H)     RDW-SD 09/20/2023 57.0 (H)     MPV 09/20/2023 11.2     Platelets 09/20/2023 225     Neutrophil % 09/20/2023 62.5     Lymphocyte % 09/20/2023 22.9     Monocyte % 09/20/2023 9.4     Eosinophil % 09/20/2023 3.2     Basophil % 09/20/2023 0.9     Immature Grans % 09/20/2023 1.1 (H)     Neutrophils, Absolute 09/20/2023 2.93     Lymphocytes, Absolute 09/20/2023 1.07     Monocytes, Absolute 09/20/2023 0.44     Eosinophils, Absolute 09/20/2023 0.15     Basophils, Absolute 09/20/2023 0.04     Immature Grans, Absolute 09/20/2023 0.05     nRBC 09/20/2023 0.0     Vitamin B-12 09/20/2023 938     TSH 09/20/2023 4.450 (H)     Free T4 09/20/2023 1.20    Lab on 09/14/2023   Component Date Value    Glucose 09/14/2023 121 (H)     BUN 09/14/2023 25 (H)     Creatinine 09/14/2023 0.93     Sodium 09/14/2023 142     Potassium 09/14/2023 5.3 (H)     Chloride 09/14/2023 101     CO2 09/14/2023 27.9     Calcium 09/14/2023 9.6     Total Protein 09/14/2023 6.6     Albumin 09/14/2023 4.5     ALT (SGPT) 09/14/2023 15     AST (SGOT) 09/14/2023 25     Alkaline Phosphatase 09/14/2023 57     Total Bilirubin 09/14/2023 0.4     Globulin 09/14/2023 2.1     A/G Ratio 09/14/2023 2.1      BUN/Creatinine Ratio 09/14/2023 26.9 (H)     Anion Gap 09/14/2023 13.1     eGFR 09/14/2023 69.6     WBC 09/14/2023 4.19     RBC 09/14/2023 3.92     Hemoglobin 09/14/2023 13.2     Hematocrit 09/14/2023 39.8     MCV 09/14/2023 101.5 (H)     MCH 09/14/2023 33.7 (H)     MCHC 09/14/2023 33.2     RDW 09/14/2023 16.1 (H)     RDW-SD 09/14/2023 60.1 (H)     MPV 09/14/2023 10.0     Platelets 09/14/2023 253     Neutrophil % 09/14/2023 51.6     Lymphocyte % 09/14/2023 29.6     Monocyte % 09/14/2023 12.6 (H)     Eosinophil % 09/14/2023 5.0     Basophil % 09/14/2023 1.2     Immature Grans % 09/14/2023 0.0     Neutrophils, Absolute 09/14/2023 2.16     Lymphocytes, Absolute 09/14/2023 1.24     Monocytes, Absolute 09/14/2023 0.53     Eosinophils, Absolute 09/14/2023 0.21     Basophils, Absolute 09/14/2023 0.05     Immature Grans, Absolute 09/14/2023 0.00     nRBC 09/14/2023 0.0    Hospital Outpatient Visit on 08/31/2023   Component Date Value    Protime 08/31/2023 14.3     INR 08/31/2023 1.2     Case Report 08/31/2023                      Value:Surgical Pathology Report                         Case: [id number]                                  Authorizing Provider:  Ubaldo Hancock Jr., MD      Collected:           08/31/2023 09:20 AM          Ordering Location:     [facility]  Received:            08/31/2023 09:54 AM                                 CT                                                                           Pathologist:           Clarita White MD                                                          Specimen:    Bone, NOS, right superior pubic ramus bx                                                   Clinical Information 08/31/2023                      Value:This result contains rich text formatting which cannot be displayed here.    Final Diagnosis 08/31/2023                      Value:This result contains rich text formatting which cannot be displayed here.    Comment 08/31/2023                       Value:This result contains rich text formatting which cannot be displayed here.    Gross Description 08/31/2023                      Value:This result contains rich text formatting which cannot be displayed here.   Office Visit on 08/23/2023   Component Date Value    Urine Culture 08/23/2023 >100,000 CFU/mL Escherichia coli (A)     Color, UA 08/23/2023 Yellow     Appearance, UA 08/23/2023 Clear     pH, UA 08/23/2023 7.0     Specific Gravity, UA 08/23/2023 1.015     Glucose, UA 08/23/2023 Negative     Ketones, UA 08/23/2023 Negative     Bilirubin, UA 08/23/2023 Negative     Blood, UA 08/23/2023 Moderate (2+) (A)     Protein, UA 08/23/2023 30 mg/dL (1+) (A)     Leuk Esterase, UA 08/23/2023 Large (3+) (A)     Nitrite, UA 08/23/2023 Positive (A)     Urobilinogen, UA 08/23/2023 0.2 E.U./dL     RBC, UA 08/23/2023 21-30 (A)     WBC, UA 08/23/2023 21-30 (A)     Bacteria, UA 08/23/2023 3+ (A)     Squamous Epithelial Cell* 08/23/2023 13-20 (A)     Hyaline Casts, UA 08/23/2023 None Seen     Methodology 08/23/2023 Manual Light Microscopy    Lab on 08/23/2023   Component Date Value    Glucose 08/23/2023 144 (H)     BUN 08/23/2023 23     Creatinine 08/23/2023 0.83     Sodium 08/23/2023 143     Potassium 08/23/2023 5.0     Chloride 08/23/2023 102     CO2 08/23/2023 29.6 (H)     Calcium 08/23/2023 9.4     Total Protein 08/23/2023 6.5     Albumin 08/23/2023 4.1     ALT (SGPT) 08/23/2023 12     AST (SGOT) 08/23/2023 27     Alkaline Phosphatase 08/23/2023 58     Total Bilirubin 08/23/2023 0.4     Globulin 08/23/2023 2.4     A/G Ratio 08/23/2023 1.7     BUN/Creatinine Ratio 08/23/2023 27.7 (H)     Anion Gap 08/23/2023 11.4     eGFR 08/23/2023 79.8     Magnesium 08/23/2023 1.9     Phosphorus 08/23/2023 3.1     WBC 08/23/2023 6.56     RBC 08/23/2023 3.71 (L)     Hemoglobin 08/23/2023 12.4     Hematocrit 08/23/2023 37.8     MCV 08/23/2023 101.9 (H)     MCH 08/23/2023 33.4 (H)     MCHC 08/23/2023 32.8     RDW 08/23/2023 15.3      RDW-SD 08/23/2023 56.7 (H)     MPV 08/23/2023 10.3     Platelets 08/23/2023 257     Neutrophil % 08/23/2023 73.6     Lymphocyte % 08/23/2023 18.1 (L)     Monocyte % 08/23/2023 5.6     Eosinophil % 08/23/2023 1.8     Basophil % 08/23/2023 0.6     Immature Grans % 08/23/2023 0.3     Neutrophils, Absolute 08/23/2023 4.82     Lymphocytes, Absolute 08/23/2023 1.19     Monocytes, Absolute 08/23/2023 0.37     Eosinophils, Absolute 08/23/2023 0.12     Basophils, Absolute 08/23/2023 0.04     Immature Grans, Absolute 08/23/2023 0.02     nRBC 08/23/2023 0.0    Labs reviewed    Current Psychotropic Medications:  Duloxetine 60 mg daily  Gabapentin 300 mg tid  Temazepam 15 mg q hs (no longer taking)  Modafinil 200 mg q AM    Plan of Care/ Medical Decision Making  Continue medications as written, remaining off temazepam.  Supported pt in upcoming apt with sleep medicine, again reiterating likely impact of unmanaged MARIA M on various spheres.  Considered effective communication strategies, impact of non verbal communication  Reiterated benefits of [email or web address], outside engagement to assist with self care.  FU scheduled in 8 weeks.    Diagnoses and all orders for this visit:    1. Generalized anxiety disorder (Primary)    2. Neoplastic malignant related fatigue    3. MARIA M (obstructive sleep apnea)    4. Major depressive disorder, recurrent episode, moderate    5. Primary malignant neoplasm of breast with metastasis

## 2023-10-18 ENCOUNTER — LAB (OUTPATIENT)
Dept: OTHER | Facility: HOSPITAL | Age: 63
End: 2023-10-18
Payer: MEDICARE

## 2023-10-18 ENCOUNTER — INFUSION (OUTPATIENT)
Dept: ONCOLOGY | Facility: HOSPITAL | Age: 63
End: 2023-10-18
Payer: MEDICARE

## 2023-10-18 ENCOUNTER — OFFICE VISIT (OUTPATIENT)
Dept: ONCOLOGY | Facility: CLINIC | Age: 63
End: 2023-10-18
Payer: MEDICARE

## 2023-10-18 VITALS
DIASTOLIC BLOOD PRESSURE: 74 MMHG | TEMPERATURE: 97.7 F | OXYGEN SATURATION: 95 % | SYSTOLIC BLOOD PRESSURE: 117 MMHG | HEART RATE: 97 BPM | RESPIRATION RATE: 16 BRPM | BODY MASS INDEX: 34.29 KG/M2 | HEIGHT: 61 IN | WEIGHT: 181.6 LBS

## 2023-10-18 DIAGNOSIS — Z79.899 HIGH RISK MEDICATION USE: ICD-10-CM

## 2023-10-18 DIAGNOSIS — C50.811 MALIGNANT NEOPLASM OF OVERLAPPING SITES OF RIGHT BREAST IN FEMALE, ESTROGEN RECEPTOR NEGATIVE: Primary | ICD-10-CM

## 2023-10-18 DIAGNOSIS — Z17.1 MALIGNANT NEOPLASM OF OVERLAPPING SITES OF RIGHT BREAST IN FEMALE, ESTROGEN RECEPTOR NEGATIVE: Primary | ICD-10-CM

## 2023-10-18 DIAGNOSIS — C50.811 MALIGNANT NEOPLASM OF OVERLAPPING SITES OF RIGHT BREAST IN FEMALE, ESTROGEN RECEPTOR NEGATIVE: ICD-10-CM

## 2023-10-18 DIAGNOSIS — G89.3 CANCER ASSOCIATED PAIN: ICD-10-CM

## 2023-10-18 DIAGNOSIS — L27.1 PALMAR PLANTAR ERYTHRODYSAESTHESIA DUE TO CYTOTOXIC THERAPY: ICD-10-CM

## 2023-10-18 DIAGNOSIS — Z17.1 MALIGNANT NEOPLASM OF OVERLAPPING SITES OF RIGHT BREAST IN FEMALE, ESTROGEN RECEPTOR NEGATIVE: ICD-10-CM

## 2023-10-18 LAB
ALBUMIN SERPL-MCNC: 4.1 G/DL (ref 3.5–5.2)
ALBUMIN/GLOB SERPL: 1.5 G/DL
ALP SERPL-CCNC: 63 U/L (ref 39–117)
ALT SERPL W P-5'-P-CCNC: 13 U/L (ref 1–33)
ANION GAP SERPL CALCULATED.3IONS-SCNC: 10 MMOL/L (ref 5–15)
AST SERPL-CCNC: 25 U/L (ref 1–32)
BASOPHILS # BLD AUTO: 0.07 10*3/MM3 (ref 0–0.2)
BASOPHILS NFR BLD AUTO: 1.9 % (ref 0–1.5)
BILIRUB SERPL-MCNC: 0.4 MG/DL (ref 0–1.2)
BUN SERPL-MCNC: 23 MG/DL (ref 8–23)
BUN/CREAT SERPL: 25 (ref 7–25)
CALCIUM SPEC-SCNC: 9.7 MG/DL (ref 8.6–10.5)
CHLORIDE SERPL-SCNC: 102 MMOL/L (ref 98–107)
CO2 SERPL-SCNC: 32 MMOL/L (ref 22–29)
CREAT SERPL-MCNC: 0.92 MG/DL (ref 0.57–1)
DEPRECATED RDW RBC AUTO: 60.9 FL (ref 37–54)
EGFRCR SERPLBLD CKD-EPI 2021: 70.5 ML/MIN/1.73
EOSINOPHIL # BLD AUTO: 0.18 10*3/MM3 (ref 0–0.4)
EOSINOPHIL NFR BLD AUTO: 4.9 % (ref 0.3–6.2)
ERYTHROCYTE [DISTWIDTH] IN BLOOD BY AUTOMATED COUNT: 15.9 % (ref 12.3–15.4)
GLOBULIN UR ELPH-MCNC: 2.7 GM/DL
GLUCOSE SERPL-MCNC: 128 MG/DL (ref 65–99)
HCT VFR BLD AUTO: 38.3 % (ref 34–46.6)
HGB BLD-MCNC: 12.2 G/DL (ref 12–15.9)
IMM GRANULOCYTES # BLD AUTO: 0.01 10*3/MM3 (ref 0–0.05)
IMM GRANULOCYTES NFR BLD AUTO: 0.3 % (ref 0–0.5)
LYMPHOCYTES # BLD AUTO: 1.13 10*3/MM3 (ref 0.7–3.1)
LYMPHOCYTES NFR BLD AUTO: 30.7 % (ref 19.6–45.3)
MAGNESIUM SERPL-MCNC: 2.2 MG/DL (ref 1.6–2.4)
MCH RBC QN AUTO: 32.7 PG (ref 26.6–33)
MCHC RBC AUTO-ENTMCNC: 31.9 G/DL (ref 31.5–35.7)
MCV RBC AUTO: 102.7 FL (ref 79–97)
MONOCYTES # BLD AUTO: 0.4 10*3/MM3 (ref 0.1–0.9)
MONOCYTES NFR BLD AUTO: 10.9 % (ref 5–12)
NEUTROPHILS NFR BLD AUTO: 1.89 10*3/MM3 (ref 1.7–7)
NEUTROPHILS NFR BLD AUTO: 51.3 % (ref 42.7–76)
NRBC BLD AUTO-RTO: 0 /100 WBC (ref 0–0.2)
PHOSPHATE SERPL-MCNC: 3.8 MG/DL (ref 2.5–4.5)
PLATELET # BLD AUTO: 276 10*3/MM3 (ref 140–450)
PMV BLD AUTO: 10.7 FL (ref 6–12)
POTASSIUM SERPL-SCNC: 4.3 MMOL/L (ref 3.5–5.2)
PROT SERPL-MCNC: 6.8 G/DL (ref 6–8.5)
RBC # BLD AUTO: 3.73 10*6/MM3 (ref 3.77–5.28)
SODIUM SERPL-SCNC: 144 MMOL/L (ref 136–145)
WBC NRBC COR # BLD: 3.68 10*3/MM3 (ref 3.4–10.8)

## 2023-10-18 PROCEDURE — 96372 THER/PROPH/DIAG INJ SC/IM: CPT

## 2023-10-18 PROCEDURE — 80053 COMPREHEN METABOLIC PANEL: CPT | Performed by: INTERNAL MEDICINE

## 2023-10-18 PROCEDURE — 83735 ASSAY OF MAGNESIUM: CPT | Performed by: INTERNAL MEDICINE

## 2023-10-18 PROCEDURE — 84100 ASSAY OF PHOSPHORUS: CPT | Performed by: INTERNAL MEDICINE

## 2023-10-18 PROCEDURE — 85025 COMPLETE CBC W/AUTO DIFF WBC: CPT | Performed by: INTERNAL MEDICINE

## 2023-10-18 PROCEDURE — 25010000002 DENOSUMAB 120 MG/1.7ML SOLUTION: Performed by: NURSE PRACTITIONER

## 2023-10-18 PROCEDURE — 36415 COLL VENOUS BLD VENIPUNCTURE: CPT

## 2023-10-18 RX ADMIN — DENOSUMAB 120 MG: 120 INJECTION SUBCUTANEOUS at 10:54

## 2023-10-18 NOTE — PROGRESS NOTES
"Chief Complaint  Previous Stage Ib ([id number]) ER/[place] positive, HER-2/peter negative right breast cancer with subsequent metastatic disease identified 10/8/2017, history of right pulmonary embolism, cancer related pain, chemotherapy-induced diarrhea, chemotherapy-induced mucositis, chemotherapy-induced hand-foot syndrome    Subjective        History of Present Illness  The patient returns today in delayed follow-up continuing on oral Xeloda 1000 mg in the morning, 1500 mg in the evening for 7 days on followed by 7 days off as well as monthly Xgeva and Eliquis 5 mg twice daily.  The patient initiated an \"off\" week of Xeloda today.  He is also due today for monthly Xgeva.      She continues to somewhat fatigued but overall is tolerating her Xeloda well.  Her hand-foot symptoms remain stable. She did receive treatment for urinary tract infection with culture on 8/23/2023 that grew E. coli resistant to ampicillin and Bactrim, received 5-day course of Cipro with resolution of symptoms.  Patient denies any recurrent urinary tract symptoms today.  She continues follow-up with supportive oncology and last seen on 10/6/2023.  She is still having difficulties with sleeping.  She is scheduled to follow-up with Dr. Orozco sleep medicine.  She does have a CPAP machine however she finds it difficult to wear as it makes her feel claustrophobic.  Nightly patient is only taking her Xanax in the morning.  No other concerns noted at this time.    Objective   Vital Signs:   /74   Pulse 97   Temp 97.7 °F (36.5 °C) (Temporal)   Resp 16   Ht 154.9 cm (60.98\")   Wt 82.4 kg (181 lb 9.6 oz)   SpO2 95%   BMI 34.33 kg/m²     Physical Exam  Constitutional:       Appearance: She is well-developed.      Comments: Patient ambulates with the use of a cane   Eyes:      Conjunctiva/sclera: Conjunctivae normal.   Neck:      Thyroid: No thyromegaly.   Cardiovascular:      Rate and Rhythm: Normal rate and regular rhythm.      Heart " sounds: No murmur heard.     No friction rub. No gallop.   Pulmonary:      Effort: No respiratory distress.      Breath sounds: Normal breath sounds.   Abdominal:      General: Bowel sounds are normal. There is no distension.      Palpations: Abdomen is soft.      Tenderness: There is no abdominal tenderness.   Musculoskeletal:      Comments: Braces in place in the bilateral lower extremities   Lymphadenopathy:      Head:      Right side of head: No submandibular adenopathy.      Cervical: No cervical adenopathy.      Upper Body:      Right upper body: No supraclavicular adenopathy.      Left upper body: No supraclavicular adenopathy.   Skin:     General: Skin is warm and dry.      Findings: Rash present.      Comments: Erythema involving the palms is currently minimal.  The degree of skin peeling is moderate, fairly stable.  There is minimal erythema involving the dorsal aspect of the fingers.  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      Cranial Nerves: No cranial nerve deficit.      Motor: No abnormal muscle tone.      Deep Tendon Reflexes: Reflexes normal.   Psychiatric:         Behavior: Behavior normal.         Result Review : Reviewed CBC, CMP from today.     Assessment and Plan     *Previous Stage Ib ([id number]) right breast cancer:  Diagnosed May 2010 with excisional biopsy for invasive ductal carcinoma, 1.3 cm, grade 2, ER 90%, AL 80%, HER-2/peter negative (1+ IHC).    Subsequent right mastectomy in July 2010 with no residual breast malignancy, 1/5 sentinel lymph node with micrometastasis (0.25 mm).    Treated in the [facility] system with adjuvant AC ×4 cycles in 2010 (no taxanes administered due to underlying Charcot-Saloni-Tooth with peripheral neuropathy).    Adjuvant Femara (postmenopausal) initiated October 2010 with plan to treat ×10 years.    Genetic testing reportedly negative.    Developed  osteopenia treated with Prolia beginning 2/27/13. Subsequently discontinued due to identification of metastatic disease.    *Recurrent/metastatic disease identified 10/8/17:  Disease involving thoracic spine with cord compression at T6, lumbosacral involvement, sternal and right sternoclavicular involvement.    Femara discontinued in 10/2017.    Radiation administered (in the [facility] system) to the thoracic spine beginning 10/19/17 treating T3-T9 to a dose of 24 gray in 6 fractions.  Evaluation with MRI 12/8/17 showing persistent T6 cord compression with persistent neurologic compromise requiring surgical treatment 12/11/17 with T6 laminectomy/corpectomy and T3-T9 fusion.  Pathology with metastatic carcinoma of breast origin, ER negative, HI negative, HER-2/peter negative (1+ IHC).  Additional staging evaluation 12/8/17 with no evidence of visceral metastatic disease, bone scan showing involvement of thoracic spine, sternum, left humerus, mid frontal bone.  No plane film correlate of left humerus lesion.  MRI lumbar spine with small intradural L3 metastasis.  CA 15-3 12/6/17- 17.  Palliative radiation therapy to L3 dural metastasis and left humerus initiated 1/15/18 (10 fractions), completed 1/26/18.  Hypercalcemia of malignancy with calcium in the 10-11 range.  Initiation of monthly Xgeva 1/23/18.  Baseline CT scan 1/30/18 with no evidence of visceral involvement.  Cluster of nodular opacities in the right lower lobe suspected to be infectious or related to bronchiolitis. Bone scan 1/30/18 showed postsurgical change in the thoracic spine, stable uptake in the frontal bone, no new areas of disease.  Initiation of palliative oral single agent Xeloda 2/7/18 2000 mg a.m., 1500 mg p.m. for 14/21 days.   Following 3 cycles xeloda, bone scan 4/4/18 showed no change from the prior study.  CT scan 4/4/18 showed a small pericardial effusion of unclear significance as well as a subcutaneous nodule in the right lateral chest  wall.  Subsequent evaluation with echocardiogram 4/17/18 showed no evidence of pericardial effusion.  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  We discussed the possibility of surgical excision of the right subcutaneous chest wall lesion for more definitive diagnosis.  Reviewed previous CT images dating back to 12/8/17 and the lesion was present even at that time measuring around 1.7 cm although not commented on in the radiology report.  As this appears to be an indolent low-grade process unrelated to her breast cancer, recommendee foregoing surgical excision at this time and monitoring this area on future scans.  The patient agreed.    Following 6 cycles of Xeloda, CT 6/6/18  showed stable findings, no evidence of progressive disease.  There was a comment regarding subcutaneous abnormality in the anterior abdominal wall and this was related to Lovenox injection sites.  Bone scan 6/6/18 showed no interval change.   CT scan and bone scan 8/13/18 following 9 cycles of Xeloda showed stable findings with no evidence of significant visceral metastases.  Her bone lesions appear stable on bone scan.  The spindle cell neoplasm in her right chest wall actually decreased in size from 2 cm down to 1.6 cm.    The patient experienced some symptoms of diarrhea, anorexia, generalized weakness during cycle 9 Xeloda it was unclear whether this was related to a viral gastroenteritis or toxicity from treatment.  Symptoms recurred during cycle 10 and treatment was cut short by 2 days.  Symptoms attributed to Xeloda.  With cycle 11, dose and schedule altered to 1500 mg twice daily for 7 days on followed by 7 days off .  CT scan 9/9/2020 with no significant changes.  Bone scan 9/9/2020 read as unchanged from prior studies however did note an area of slight activity in the medial left femur.   Contacted radiology and although this was not noted on prior reports appears to have been present.  Subsequent MRI left femur 9/21/2020 with cortical thickening and periosteal edema left iliac us muscle insertion to the medial left femur with no evidence of metastatic disease, favored to represent periosteal change secondary to insertional tendinitis.  Severe hand-foot symptoms causing sclerodactyly and limitation in finger movement prompted change in dosing in July 2021 with Xeloda decreased to 1000 mg a.m., 1500 mg p.m. for 7 days on followed by 7 days off.  Patient was experiencing severe hand-foot syndrome related to Xeloda having developed skin peeling, sclerodactyly with limited use of her hands.  On 3/31/2022, Xeloda was held to allow for recovery.  Patient resumed Xeloda on 4/18/2022.  Patient required hospitalization 5/29 - 6/1/2022 with presumed viral gastroenteritis that was exacerbated by Xeloda.  Xeloda held briefly, resumed on 6/6/2022.  Patient again with worsening sclerodactyly, Xeloda held for 2 weeks from 10/3 - 10/17/2022, resumed at prior dose with improvement in symptoms.  Scans on 3/3/2023 with CT chest abdomen pelvis and bone scan showing stable findings.  CT chest abdomen pelvis 7/3/2023 with questionable 1 mm change in size right lower lobe nodule.  Additional small pulmonary nodules stable.  Stable bony metastatic disease.  Bone scan on 7/7/2023 however showed slight progression focal involvement right ischium and superior pubic ramus (new ischial lesion superior pubic ramus compared to 10/24/2022 and more conspicuous compared to 3/3/2023).  Metastatic lesion right inferior pubic ramus and ischial tuberosity increased in size since October 2022 however stable from 3/3/2023.  MRI cervical spine 7/3/2023 with no metastatic involvement however identification of the lesion at T2, recommended dedicated thoracic spine MRI to evaluate further.  Given the minimal extent and slow degree of  "progression, elected to continue oral Xeloda and evaluate further with MRI pelvis and thoracic spine.  Consideration of biopsy pelvic metastasis for repeat ER/DE/HER2/peter testing.    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  CA 15-3 on 7/10/2023 was 12.2, uninformative.  MRI thoracic spine 7/27/2023 with 1 cm metastatic focus seen in L1  MRI pelvis 7/27/2023 with metastatic foci identified right superior pubic ramus, right ischial tuberosity, inferior pubic ramus, L5 body.  Chronic appearing posttraumatic and/or degenerative change in the posterior right ilium adjacent SI joint.  CT-guided biopsy right pubic ramus lesion on 8/31/2023.  Pathology showed no evidence of malignancy, showed markedly calcified and sclerotic mature lamellar bone.  Attempted decalcification but no evidence of malignancy.  CT chest abdomen pelvis 9/8/2023 and bone scan 9/11/2023 with stable findings.  The patient returns today continuing on treatment with Xeloda 1000 mg a.m., 1500 mg p.m. 7 days on followed by 7 days off.  She also continues on monthly Xgeva, due next on 9/20/2023.  Yesterday she started an \"on\" week of Xeloda.  She is tolerating treatment reasonably well, hand-foot symptoms are fairly stable currently.  We reviewed her recent CT biopsy of the right pubic ramus lesion.  This showed no evidence of malignancy, did show markedly calcified and sclerotic bone which may represent a treated metastasis.  Unclear whether this is a false negative result based on sampling error.  We did review her scans as noted above with CT chest abdomen pelvis 9/8/2023 and bone scan 9/11/2023 that show stable findings.  She is asymptomatic from her disease currently.  Unclear whether the changes seen on her previous bone scan were artifactual.  Without definitive evidence of further progression, I have recommended that we maintain her current treatment and the patient agrees.  We will plan to " repeat scans at a 3-month interval.  In the meantime she will return for Xgeva on 9/20/2023 and 4 weeks later will have NP visit with Xgeva.  I will see her in 8 weeks when she is due for Xgeva and again in 12 weeks and just prior to that visit we will repeat CT scans and bone scan.  10/19/2023: Patient is currently on her off week of Xeloda.  Hand foot syndrome symptoms remain stable.  Patient returns today for anticipated monthly Xgeva.  She is having some difficulties with sleeping and tolerance of CPAP machine.  She is scheduled to follow-up with Dr. Orozco in early November.  Currently she is only taking her Xanax in the morning.  She has been encouraged to take this about 30 minutes prior to going to bed to see if she can tolerate her CPAP machine.  We did review good sleeping habits but ultimately I do feel that most of patient's sleeping troubles are anxiety driven.  She continues to be followed by supportive oncology.    *Right pulmonary embolism:  Diagnosed on CT angiogram 10/21/17 involving small right lower lobe pulmonary artery.  Lower extremity Dopplers negative.  Bilateral lower extremity Dopplers negative again 12/5/17.  Received chronic Lovenox 1 mg/kg twice per day, transitioned to oral Eliquis in February 2019, continuing on Eliquis 5 mg twice daily.  Patient continues on Eliquis 5 mg twice daily    *Cancer related pain:  Previously receiving Duragesic 50 µg patch every 72 hours along with Dilaudid 4 mg as needed for breakthrough pain  The patient's pain improved over time and she was able to discontinue both Duragesic and Dilaudid in the interval.  Patient does take occasional Flexeril at bedtime due to back spasm/pain when she has been more active.  The patient does have some occasional aggravation of her chronic back pain and does use tramadol 50 mg every 8 hours as needed  Patient was receiving tramadol 50 mg every 8-12 hours as needed in addition to hydrocodone 5/325 every 4 hours as  needed.  She was also taking OTC NSAIDs.  Patient developed nausea related to hydrocodone and was transitioned to Percocet 5/325 every 4 hours as needed, advised to stop taking NSAIDs with ongoing anticoagulation.  Patient notes that the pain is currently under fair control.    *Chemotherapy-induced diarrhea with subsequent C. difficile colitis in the setting of previous ulcerative colitis and then identification of enteropathogenic E. coli:  Patient with reported history of ulcerative colitis, continues on mesalamine.  The patient developed initial diarrhea related to Xeloda at regular dosing.  Symptoms improved on reduced dose Xeloda  Flare of symptoms in October 2018 with apparent finding of C. difficile colitis by GI, treated with course of oral vancomycin with improvement in symptoms.  Patient notes minimal intermittent diarrhea/loose stools on Xeloda requiring occasional dosing of Imodium.    Patient required hospitalization 5/29 - 6/1/2022 with presumed viral gastroenteritis that was exacerbated by Xeloda.  Xeloda held briefly, resumed on 6/6/2022.  Patient's  developed C. difficile colitis and patient was experiencing increase in diarrhea.  Stool studies performed 8/4/22 negative C. difficile however GI PCR was positive for enteropathogenic E. coli.  Given patient's symptoms and immunocompromise status it was elected to treat her with azithromycin 500 mg daily x3 days beginning 8/5/2022  Diarrhea resolved  Patient underwent colonoscopy on 2/28/2023 with findings of diverticulosis    *Traumatic left tibia/fibular fracture:  Status post ORIF 12/6/17  Specimen was sent for pathologic review, negative for evidence of malignancy    *Hypercalcemia:  Suspect hypercalcemia of malignancy, calcium in  10-11 range previously.  Calcium normalized following initiation of monthly Xgeva on 1/23/18.  10/18/2023: Seen today is 9.7.  We will proceed with Xgeva as scheduled.    *Chemotherapy-induced mucositis:  Patient  had a minimal degree of mucositis with cycle 2.  The patient has magic mouthwash to use as needed.  No subsequent mucositis.    *Recurrent UTI, bladder wall thickening on CT:  Patient had an enterococcal UTI on 3/2/18 sensitive to nitrofurantoin and received treatment, unclear how long.  Recurrent UTI 3/20/18 with urine culture growing Klebsiella, initially treated with nitrofurantoin, transitioned to Levaquin.  CT 4/4/18 with diffuse bladder wall thickening with increased nodular thickening at the left base.  Referral to urogynecology Dr. May Johnson.  She was placed on a prophylactic dose of trimethoprim 100 mg daily, bladder wall thickening felt to be related to recent recurrent urinary tract infections.  Patient with urinary symptoms, treated with course of Macrobid at the end of December 2018, urine culture however was negative 12/31/18.  Patient was found to have Klebsiella UTI 7/29/2019 which was successfully treated with Macrobid with complete resolution of symptoms.  CT 8/10/2021 with diffuse bladder wall thickening new from 5/17/2021 (however seen on multiple prior scans).    UTI on 10/18/2021 with E. coli greater than 100,000 colonies that was pansensitive, treated with Macrobid x7 days  With ongoing/recurrent UTIs patient was seen by urogynecology and initiated suppressive therapy with trimethoprim 100 mg daily in December 2021.  She has not experienced any further urinary tract infections.  CT abdomen pelvis 3/28/2022 does show diffuse thickening of urinary bladder as has been seen on prior studies indicative of cystitis.  Patient notes that she did stop taking trimethoprim on a daily basis.    Patient with urine culture on 5/5/2023 that grew E. coli and she received antibiotic treatment from urogynecology.    Urine culture on 8/23/2023 with greater than 100,000 colonies of E. coli resistant to ampicillin and Bactrim.  Received 5-day course of Cipro with resolution of symptoms.  10/18/2023: Patient does  report that her UTI symptoms have resolved and have not recurred since completion of antibiotics.    *Mobility:  The patient underwent an intensive course of [facility] at [facility].  She graduated from her outpatient course November 2018.  Overall the patient has improved dramatically in terms of mobility,   Patient developed significant callus formation lateral aspect of the left plantar surface.  She did meet with her brace specialist and additional padding was added in this area.  She continues to follow-up with her podiatrist Dr. Ware.    *Depression:  The patient is continuing follow-up in the supportive oncology clinic and is currently continuing on Cymbalta 30 mg twice daily as well as gabapentin 600 mg nightly, temazepam 15 mg nightly as needed, Provigil 100 mg daily.  Patient does continue to attend support groups at [facility].  The patient does continue routine follow-up in supportive oncology clinic.    Patient does have multiple stressors with her 's malignancy and chemotherapy as well as her autistic son who is living with them at home.  Patient continues to have difficulty with stress and anxiety, is being followed by supportive oncology, last seen on 10/6/2023.    *Hand-foot syndrome secondary to Xeloda:  Patient continues with frequent application of emollient cream to the hands and feet  Symptoms increased significantly requiring a 1 week delay in cycle 18 Xeloda as noted above.  Symptoms did improve and she continued on the same dose.    Patient was referred to dermatology and has been continuing on triamcinolone 0.1% cream used for 1 week on followed by 1 week off which led to some further improvement.   Progressive palmar erythema with development of sclerodactyly and effect on dexterity.  Addition of urea-based cream 3 times daily.  Patient with continued difficulty regarding erythema of her hands that extended onto the dorsal aspect and was causing contractures/sclerodactyly in  her fingers affecting her dexterity.  In July 2021, held Xeloda for an additional week and then reduced dose from 1500 mg twice daily down to 1000 mg a.m. and 1500 mg p.m. and continued on a 7-day on followed by 7-day off schedule.  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  On 3/31/2022, Xeloda was held to allow for recovery.  Patient resumed Xeloda on 4/18/2022.  Patient developed worsening sclerodactyly affecting dexterity that required Xeloda to again be briefly held for 2 weeks from 10/3 - 10/17/2022.  Treatment resumed at prior dose after improvement in symptoms.  Patient continues to use emollient cream frequently (currently 5 times daily) and topical triamcinolone 0.1% twice daily intermittently (2 weeks on followed by 2 weeks off as instructed by dermatology).  Symptoms currently stable with mild erythema of the palms of the hands and decrease and skin peeling.  Minimal erythema currently on the dorsal aspect of the fingers.  Sclerodactyly and effect on dexterity stable.    *Evidence of steatosis on scans with mild intermittent elevated liver function studies:  Liver function studies increased 8/20/19 with ALT 98, AST 70, normal total bilirubin.  Negative viral hepatitis A, B, and C panel 8/23/18  Likely related to hepatic steatosis.   Subsequent improvement in LFTs  LFTs today are normal    *Chemotherapy induced leukopenia:  WBC today 4.19 with normal differential    *GERD, question of celiac disease:  Patient with significant history of reflux  Patient currently receiving Protonix 40 mg daily daily and Carafate 1 g 4 times daily as needed  Patient required hospitalization 5/29 - 6/1/2022 with presumed viral gastroenteritis that was exacerbated by Xeloda.    EGD  performed 5/31/2022 during hospitalization with biopsy that showed focal blunting of villi and atrophy with slight increase in intraepithelial lymphocytes.  Changes were minimal but recommended correlation with serology to exclude celiac disease.  Celiac serology 8/8/2022 negative  Patient previously developed worsening reflux symptoms, temporarily increase Protonix to 40 mg twice daily and we did add Carafate 1 g 4 times daily.    She is taking Protonix 40 mg once daily, is not taking Carafate.    *Insomnia:  Patient with prior paradoxical reaction to Benadryl  Improved previously on temazepam 15 mg nightly as needed.   Patient noted subsequently that temazepam was having no effect.   Patient increased gabapentin to 900 mg nightly, discontinued temazepam  10/18/2023: Patient continues to have difficulty with sleep and has not been able to tolerate her CPAP.  She does follow-up with Dr. Orozco in early November.  Patient does note that the CPAP machine makes her feel somewhat claustrophobic.  She has been encouraged to take her Xanax prior to going to bed and see if she can have better tolerance for better quality of sleep.    *Health maintenance:  Patient notes that she has a history of colon polyps as well as ulcerative colitis and was due for a follow-up colonoscopy on 9/12/2019.  We did discuss there is no necessity to pursue colonoscopy in the setting of her metastatic breast cancer.    The patient did undergo colonoscopy on 2/7/2020 with findings of muscular hypertrophy and diverticulosis.   Patient did wish to proceed with further colonoscopic evaluation, performed on 12/20/2022 with poor prep.  Repeat 2/28/2023 with diverticulosis.    *Bilateral shoulder pain:  Chronic difficulty with right shoulder although she has not complained of this in prior visits to our office.  She reported difficulty with abduction.    The patient did undergo MRI of her right shoulder on 11/21/2019 at an outside facility showing  multiple abnormalities including supraspinatus tendinosis, labral tear but no evidence of metastatic disease.  Patient developed pain in the left shoulder, was seen by orthopedics and underwent steroid injection with some improvement.  Right shoulder stable.  Patient did undergo physical therapy with some improvement.  She continues to use tramadol 50 mg every 8-12 hours as needed.  Note that current CT 10/20/2022 made notation of left shoulder probable bursitis.    Patient continues with bilateral shoulder pain, left greater than right which does wax and wane.    *Ocular changes in part related to Xeloda:  Patient experienced a mild degree of blurred vision as well as burning and pruritus.  She was seen by her ophthalmologist and was placed on xiidra ophthalmic drops which have helped.  Likely both issues are to some extent related to Xeloda.  Symptoms did worsen and patient was seen by ophthalmologist at [facility].  She is now using an ocular lubricant at night in addition to artificial tears which have helped.  Symptoms currently stable to improved    *Elevated glucose:  It is noted the patient's glucose at the last few visits has been in the high 100 range, postprandial.  She has had a hemoglobin A1c that has been in the high 5-6 range in the past, on 5/1/2019 at 5.7  Hemoglobin A1c was last checked in 11/12/2021 at 5.8    *Possible loosening of pedicle screw at T3:  CT cervical spine 5/17/2021 showed loosening of the left pedicle screw at T3.  Patient referred to orthopedics and was seen by Dr. Nayak who felt that there were no concerning findings on the scan    *Iron deficiency anemia  Labs on 5/2/2022 with hemoglobin 10.8.  Additional labs with iron 48, ferritin 21.2, iron saturation 11%, TIBC 4 and 32, folate greater than 20, B12 greater than 2000.    Attempted treatment with oral iron daily however patient was intolerant  Patient subsequently received Venofer 300 mg weekly x4 beginning  "6/6/2022 and completed on 6/27/2022  Subsequent iron studies on 7/11/2022 showed improvement with iron 62, ferritin 345, iron saturation 18%, TIBC 336.  Hemoglobin had improved up to 12.7 at that time.  Repeat iron studies on 10/3/2022 showed iron replete status with hemoglobin 13.7, iron 121, ferritin 208.7, iron saturation 31%, TIBC 392.  Hemoglobin currently normal at 13.2  10/18/2023 clotting hemoglobin today is 12.2     Plan:  Continue palliative single agent Xeloda 1000 mg a.m., 1500 mg in the p.m. 7 days on followed by 7 days off (patient initiated a \"off\" week of treatment today)  Continue monthly Xgeva, given today 10/18/2023  Continue Eliquis 5 mg twice daily  The patient will continue frequent use of emollient cream currently 5 times daily and continue periodic triamcinolone cream 0.1% twice daily (provided by dermatology) 2 weeks on followed by 2 weeks off as prescribed  Continue Protonix 40 mg daily  Continue ocular lubricating gel nightly per ophthalmology  Continue Provigil 100 mg daily and Cymbalta 30 mg twice daily.  Patient continues routine follow-up with supportive oncology   Patient will continue gabapentin 900 mg at night.   Patient continues on tramadol 50 mg every 8 hours as needed for pain  Continue Percocet 5/325 every 4 hours as needed for pain (reports nausea from hydrocodone she received previously)  Take Xanax prior to bedtime to see if this helps with CPAP compliance.    To follow-up with Dr. Orozco at the beginning of November.    The patient continues follow-up with her podiatrist and brace specialist regarding callus formation and scaling in the lateral left plantar surface  On 11/15/2023 MD visit with CBC, CMP, magnesium, phosphorus and Xgeva.  On 12/13/2023 MD visit with CBC, CMP, magnesium, phosphorus and Xgeva.  1 week prior CT chest abdomen pelvis and bone scan.     Patient continuing on high risk medication requiring intensive monitoring.                                  "

## 2023-10-19 ENCOUNTER — SPECIALTY PHARMACY (OUTPATIENT)
Dept: PHARMACY | Facility: HOSPITAL | Age: 63
End: 2023-10-19
Payer: MEDICARE

## 2023-11-14 NOTE — PROGRESS NOTES
"Chief Complaint  Previous Stage Ib ([id number]) ER/KS positive, HER-2/peter negative right breast cancer with subsequent metastatic disease identified 10/8/2017, history of right pulmonary embolism, cancer related pain, chemotherapy-induced diarrhea, chemotherapy-induced mucositis, chemotherapy-induced hand-foot syndrome    Subjective        History of Present Illness  The patient returns today in delayed follow-up continuing on oral Xeloda 1000 mg in the morning, 1500 mg in the evening for 7 days on followed by 7 days off as well as monthly Xgeva and Eliquis 5 mg twice daily.  The patient completed a week of Xeloda on 11/14/2023 and is now on an \"off week\".  She continues to tolerate Xeloda reasonably well.  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  Fortunately her skin changes have improved somewhat currently, sclerodactyly does persist however patient feels that this is manageable and tolerable.  She reports a normal appetite, she reports normal bowel function.  She denies any other issues recently.  She does continue routine follow-up with supportive oncology, was last seen on 10/6/2023.      Objective   Vital Signs:   /82   Pulse 93   Temp 98.6 °F (37 °C) (Temporal)   Resp 18   Ht 154.9 cm (60.98\")   Wt 82.6 kg (182 lb)   SpO2 95%   BMI 34.41 kg/m²     Physical Exam  Constitutional:       Appearance: She is well-developed.      Comments: Patient ambulates with the use of a cane   Eyes:      Conjunctiva/sclera: Conjunctivae normal.   Neck:      Thyroid: No thyromegaly.   Cardiovascular:      Rate and Rhythm: Normal rate and regular rhythm.      Heart sounds: No murmur heard.     No friction rub. No gallop.   Pulmonary:      Effort: No respiratory distress.      Breath sounds: Normal breath sounds.   Abdominal:      General: Bowel sounds are normal. There is no distension.      Palpations: Abdomen is soft.      " Tenderness: There is no abdominal tenderness.   Musculoskeletal:      Comments: Braces in place in the bilateral lower extremities   Lymphadenopathy:      Head:      Right side of head: No submandibular adenopathy.      Cervical: No cervical adenopathy.      Upper Body:      Right upper body: No supraclavicular adenopathy.      Left upper body: No supraclavicular adenopathy.   Skin:     General: Skin is warm and dry.      Findings: Rash present.      Comments: Erythema involving the palms is currently minimal, improved.  The degree of skin peeling is mild and stable to improved.  There is minimal erythema involving the dorsal aspect of the fingers.  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      Cranial Nerves: No cranial nerve deficit.      Motor: No abnormal muscle tone.      Deep Tendon Reflexes: Reflexes normal.   Psychiatric:         Behavior: Behavior normal.         Result Review : Reviewed CBC, CMP, magnesium, phosphorus from today.     Assessment and Plan     *Previous Stage Ib ([id number]) right breast cancer:  Diagnosed May 2010 with excisional biopsy for invasive ductal carcinoma, 1.3 cm, grade 2, ER 90%, UT 80%, HER-2/peter negative (1+ IHC).    Subsequent right mastectomy in July 2010 with no residual breast malignancy, 1/5 sentinel lymph node with micrometastasis (0.25 mm).    Treated in the [facility] system with adjuvant AC ×4 cycles in 2010 (no taxanes administered due to underlying Charcot-Saloni-Tooth with peripheral neuropathy).    Adjuvant Femara (postmenopausal) initiated October 2010 with plan to treat ×10 years.    Genetic testing reportedly negative.    Developed osteopenia treated with Prolia beginning 2/27/13. Subsequently discontinued due to identification of metastatic disease.    *Recurrent/metastatic disease identified 10/8/17:  Disease involving thoracic spine with cord compression at T6,  lumbosacral involvement, sternal and right sternoclavicular involvement.    Femara discontinued in 10/2017.    Radiation administered (in the [facility] system) to the thoracic spine beginning 10/19/17 treating T3-T9 to a dose of 24 gray in 6 fractions.  Evaluation with MRI 12/8/17 showing persistent T6 cord compression with persistent neurologic compromise requiring surgical treatment 12/11/17 with T6 laminectomy/corpectomy and T3-T9 fusion.  Pathology with metastatic carcinoma of breast origin, ER negative, IA negative, HER-2/peter negative (1+ IHC).  Additional staging evaluation 12/8/17 with no evidence of visceral metastatic disease, bone scan showing involvement of thoracic spine, sternum, left humerus, mid frontal bone.  No plane film correlate of left humerus lesion.  MRI lumbar spine with small intradural L3 metastasis.  CA 15-3 12/6/17- 17.  Palliative radiation therapy to L3 dural metastasis and left humerus initiated 1/15/18 (10 fractions), completed 1/26/18.  Hypercalcemia of malignancy with calcium in the 10-11 range.  Initiation of monthly Xgeva 1/23/18.  Baseline CT scan 1/30/18 with no evidence of visceral involvement.  Cluster of nodular opacities in the right lower lobe suspected to be infectious or related to bronchiolitis. Bone scan 1/30/18 showed postsurgical change in the thoracic spine, stable uptake in the frontal bone, no new areas of disease.  Initiation of palliative oral single agent Xeloda 2/7/18 2000 mg a.m., 1500 mg p.m. for 14/21 days.   Following 3 cycles xeloda, bone scan 4/4/18 showed no change from the prior study.  CT scan 4/4/18 showed a small pericardial effusion of unclear significance as well as a subcutaneous nodule in the right lateral chest wall.  Subsequent evaluation with echocardiogram 4/17/18 showed no evidence of pericardial effusion.  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  We discussed the possibility of surgical excision of the right subcutaneous chest wall lesion for more definitive diagnosis.  Reviewed previous CT images dating back to 12/8/17 and the lesion was present even at that time measuring around 1.7 cm although not commented on in the radiology report.  As this appears to be an indolent low-grade process unrelated to her breast cancer, recommendee foregoing surgical excision at this time and monitoring this area on future scans.  The patient agreed.    Following 6 cycles of Xeloda, CT 6/6/18  showed stable findings, no evidence of progressive disease.  There was a comment regarding subcutaneous abnormality in the anterior abdominal wall and this was related to Lovenox injection sites.  Bone scan 6/6/18 showed no interval change.   CT scan and bone scan 8/13/18 following 9 cycles of Xeloda showed stable findings with no evidence of significant visceral metastases.  Her bone lesions appear stable on bone scan.  The spindle cell neoplasm in her right chest wall actually decreased in size from 2 cm down to 1.6 cm.    The patient experienced some symptoms of diarrhea, anorexia, generalized weakness during cycle 9 Xeloda it was unclear whether this was related to a viral gastroenteritis or toxicity from treatment.  Symptoms recurred during cycle 10 and treatment was cut short by 2 days.  Symptoms attributed to Xeloda.  With cycle 11, dose and schedule altered to 1500 mg twice daily for 7 days on followed by 7 days off .  CT scan 9/9/2020 with no significant changes.  Bone scan 9/9/2020 read as unchanged from prior studies however did note an area of slight activity in the medial left femur.  Contacted radiology and although this was not noted on prior reports appears to have been present.  Subsequent MRI left femur 9/21/2020 with cortical thickening and periosteal edema left iliac us muscle insertion to the medial left  femur with no evidence of metastatic disease, favored to represent periosteal change secondary to insertional tendinitis.  Severe hand-foot symptoms causing sclerodactyly and limitation in finger movement prompted change in dosing in July 2021 with Xeloda decreased to 1000 mg a.m., 1500 mg p.m. for 7 days on followed by 7 days off.  Patient was experiencing severe hand-foot syndrome related to Xeloda having developed skin peeling, sclerodactyly with limited use of her hands.  On 3/31/2022, Xeloda was held to allow for recovery.  Patient resumed Xeloda on 4/18/2022.  Patient required hospitalization 5/29 - 6/1/2022 with presumed viral gastroenteritis that was exacerbated by Xeloda.  Xeloda held briefly, resumed on 6/6/2022.  Patient again with worsening sclerodactyly, Xeloda held for 2 weeks from 10/3 - 10/17/2022, resumed at prior dose with improvement in symptoms.  Scans on 3/3/2023 with CT chest abdomen pelvis and bone scan showing stable findings.  CT chest abdomen pelvis 7/3/2023 with questionable 1 mm change in size right lower lobe nodule.  Additional small pulmonary nodules stable.  Stable bony metastatic disease.  Bone scan on 7/7/2023 however showed slight progression focal involvement right ischium and superior pubic ramus (new ischial lesion superior pubic ramus compared to 10/24/2022 and more conspicuous compared to 3/3/2023).  Metastatic lesion right inferior pubic ramus and ischial tuberosity increased in size since October 2022 however stable from 3/3/2023.  MRI cervical spine 7/3/2023 with no metastatic involvement however identification of the lesion at T2, recommended dedicated thoracic spine MRI to evaluate further.  Given the minimal extent and slow degree of progression, elected to continue oral Xeloda and evaluate further with MRI pelvis and thoracic spine.  Consideration of biopsy pelvic metastasis for repeat ER/AK/HER2/peter testing.    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  CA 15-3 on 7/10/2023 was 12.2, uninformative.  MRI thoracic spine 7/27/2023 with 1 cm metastatic focus seen in L1  MRI pelvis 7/27/2023 with metastatic foci identified right superior pubic ramus, right ischial tuberosity, inferior pubic ramus, L5 body.  Chronic appearing posttraumatic and/or degenerative change in the posterior right ilium adjacent SI joint.  CT-guided biopsy right pubic ramus lesion on 8/31/2023.  Pathology showed no evidence of malignancy, showed markedly calcified and sclerotic mature lamellar bone.  Attempted decalcification but no evidence of malignancy.  CT chest abdomen pelvis 9/8/2023 and bone scan 9/11/2023 with stable findings.  The patient returns today continuing on treatment with Xeloda 1000 mg a.m., 1500 mg p.m. 7 days on followed by 7 days off.  She also continues on monthly Xgeva due today.  Today she began an \"off week\" of Xeloda.  She continues to tolerate treatment reasonably well, feels that she is maintaining adequate quality of life.  Her main issue on treatment has been hand-foot syndrome, primarily affecting the hands with associated sclerodactyly.  The degree of erythema and skin peeling has improved somewhat recently, degree of sclerodactyly appears stable.  Patient reports that function in her hands is stable and acceptable.  She has no new complaints today.  We will proceed on with treatment as planned with Xgeva and ongoing Xeloda.  She is scheduled in 3 weeks for repeat scans and I will see her as scheduled in 4 weeks to review scan results and pending results continue current regimen.    *Right pulmonary embolism:  Diagnosed on CT angiogram 10/21/17 involving small right lower lobe pulmonary artery.  Lower extremity Dopplers negative.  Bilateral lower extremity Dopplers negative again 12/5/17.  Received chronic Lovenox 1 mg/kg twice per day, transitioned to oral Eliquis in February 2019, continuing on Eliquis 5 mg twice " daily.  Patient continues on Eliquis 5 mg twice daily    *Cancer related pain:  Previously receiving Duragesic 50 µg patch every 72 hours along with Dilaudid 4 mg as needed for breakthrough pain  The patient's pain improved over time and she was able to discontinue both Duragesic and Dilaudid in the interval.  Patient does take occasional Flexeril at bedtime due to back spasm/pain when she has been more active.  The patient does have some occasional aggravation of her chronic back pain and does use tramadol 50 mg every 8 hours as needed  Patient was receiving tramadol 50 mg every 8-12 hours as needed in addition to hydrocodone 5/325 every 4 hours as needed.  She was also taking OTC NSAIDs.  Patient developed nausea related to hydrocodone and was transitioned to Percocet 5/325 every 4 hours as needed, advised to stop taking NSAIDs with ongoing anticoagulation.  Patient notes that the pain is currently under fair control.    *Chemotherapy-induced diarrhea with subsequent C. difficile colitis in the setting of previous ulcerative colitis and then identification of enteropathogenic E. coli:  Patient with reported history of ulcerative colitis, continues on mesalamine.  The patient developed initial diarrhea related to Xeloda at regular dosing.  Symptoms improved on reduced dose Xeloda  Flare of symptoms in October 2018 with apparent finding of C. difficile colitis by GI, treated with course of oral vancomycin with improvement in symptoms.  Patient notes minimal intermittent diarrhea/loose stools on Xeloda requiring occasional dosing of Imodium.    Patient required hospitalization 5/29 - 6/1/2022 with presumed viral gastroenteritis that was exacerbated by Xeloda.  Xeloda held briefly, resumed on 6/6/2022.  Patient's  developed C. difficile colitis and patient was experiencing increase in diarrhea.  Stool studies performed 8/4/22 negative C. difficile however GI PCR was positive for enteropathogenic E. coli.  Given  patient's symptoms and immunocompromise status it was elected to treat her with azithromycin 500 mg daily x3 days beginning 8/5/2022  Diarrhea resolved  Patient underwent colonoscopy on 2/28/2023 with findings of diverticulosis    *Traumatic left tibia/fibular fracture:  Status post ORIF 12/6/17  Specimen was sent for pathologic review, negative for evidence of malignancy    *Hypercalcemia:  Suspect hypercalcemia of malignancy, calcium in  10-11 range previously.  Calcium normalized following initiation of monthly Xgeva on 1/23/18.    *Chemotherapy-induced mucositis:  Patient had a minimal degree of mucositis with cycle 2.  The patient has magic mouthwash to use as needed.  No subsequent mucositis.    *Recurrent UTI, bladder wall thickening on CT:  Patient had an enterococcal UTI on 3/2/18 sensitive to nitrofurantoin and received treatment, unclear how long.  Recurrent UTI 3/20/18 with urine culture growing Klebsiella, initially treated with nitrofurantoin, transitioned to Levaquin.  CT 4/4/18 with diffuse bladder wall thickening with increased nodular thickening at the left base.  Referral to urogynecology Dr. May Johnson.  She was placed on a prophylactic dose of trimethoprim 100 mg daily, bladder wall thickening felt to be related to recent recurrent urinary tract infections.  Patient with urinary symptoms, treated with course of Macrobid at the end of December 2018, urine culture however was negative 12/31/18.  Patient was found to have Klebsiella UTI 7/29/2019 which was successfully treated with Macrobid with complete resolution of symptoms.  CT 8/10/2021 with diffuse bladder wall thickening new from 5/17/2021 (however seen on multiple prior scans).    UTI on 10/18/2021 with E. coli greater than 100,000 colonies that was pansensitive, treated with Macrobid x7 days  With ongoing/recurrent UTIs patient was seen by urogynecology and initiated suppressive therapy with trimethoprim 100 mg daily in December 2021.  She  has not experienced any further urinary tract infections.  CT abdomen pelvis 3/28/2022 does show diffuse thickening of urinary bladder as has been seen on prior studies indicative of cystitis.  Patient notes that she did stop taking trimethoprim on a daily basis.    Patient with urine culture on 5/5/2023 that grew E. coli and she received antibiotic treatment from urogynecology.    Urine culture on 8/23/2023 with greater than 100,000 colonies of E. coli resistant to ampicillin and Bactrim.  Received 5-day course of Cipro with resolution of symptoms.  Patient did have UTI with E. coli on culture 10/2/2023, received a course of Augmentin.  Patient with no current symptoms of UTI    *Mobility:  The patient underwent an intensive course of rehabilitation at [facility].  She graduated from her outpatient course November 2018.  Overall the patient has improved dramatically in terms of mobility,   Patient developed significant callus formation lateral aspect of the left plantar surface.  She did meet with her brace specialist and additional padding was added in this area.  She continues to follow-up with her podiatrist Dr. Ware.    *Depression:  The patient is continuing follow-up in the supportive oncology clinic and is currently continuing on Cymbalta 30 mg twice daily as well as gabapentin 600 mg nightly, temazepam 15 mg nightly as needed, Provigil 100 mg daily.  Patient does continue to attend support groups at [employer].  The patient does continue routine follow-up in supportive oncology clinic.    Patient does have multiple stressors with her 's malignancy and chemotherapy as well as her autistic son who is living with them at home.  Patient continues to have difficulty with stress and anxiety, is being followed by supportive oncology, last seen on 10/6/2023.    *Hand-foot syndrome secondary to Xeloda:  Patient continues with frequent application of emollient cream to the hands and feet  Symptoms increased  significantly requiring a 1 week delay in cycle 18 Xeloda as noted above.  Symptoms did improve and she continued on the same dose.    Patient was referred to dermatology and has been continuing on triamcinolone 0.1% cream used for 1 week on followed by 1 week off which led to some further improvement.   Progressive palmar erythema with development of sclerodactyly and effect on dexterity.  Addition of urea-based cream 3 times daily.  Patient with continued difficulty regarding erythema of her hands that extended onto the dorsal aspect and was causing contractures/sclerodactyly in her fingers affecting her dexterity.  In July 2021, held Xeloda for an additional week and then reduced dose from 1500 mg twice daily down to 1000 mg a.m. and 1500 mg p.m. and continued on a 7-day on followed by 7-day off schedule.  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  On 3/31/2022, Xeloda was held to allow for recovery.  Patient resumed Xeloda on 4/18/2022.  Patient developed worsening sclerodactyly affecting dexterity that required Xeloda to again be briefly held for 2 weeks from 10/3 - 10/17/2022.  Treatment resumed at prior dose after improvement in symptoms.  Patient continues to use emollient cream frequently (currently 5 times daily) and topical triamcinolone 0.1% twice daily intermittently (2 weeks on followed by 2 weeks off as instructed by dermatology).  Symptoms currently stable to improved in terms of erythema and skin peeling.  Sclerodactyly stable.  Patient reports acceptable level of functioning in regards to her hands.    *Evidence of steatosis on scans with mild intermittent elevated liver function studies:  Liver function studies increased 8/20/19 with ALT 98, AST 70, normal total  bilirubin.  Negative viral hepatitis A, B, and C panel 8/23/18  Likely related to hepatic steatosis.   Subsequent improvement in LFTs  LFTs today are normal    *Chemotherapy induced leukopenia:  WBC today 5.79 with normal differential    *GERD, question of celiac disease:  Patient with significant history of reflux  Patient currently receiving Protonix 40 mg daily daily and Carafate 1 g 4 times daily as needed  Patient required hospitalization 5/29 - 6/1/2022 with presumed viral gastroenteritis that was exacerbated by Xeloda.    EGD performed 5/31/2022 during hospitalization with biopsy that showed focal blunting of villi and atrophy with slight increase in intraepithelial lymphocytes.  Changes were minimal but recommended correlation with serology to exclude celiac disease.  Celiac serology 8/8/2022 negative  Patient previously developed worsening reflux symptoms, temporarily increase Protonix to 40 mg twice daily and we did add Carafate 1 g 4 times daily.    She is taking Protonix 40 mg once daily, is not taking Carafate.    *Insomnia:  Patient with prior paradoxical reaction to Benadryl  Improved previously on temazepam 15 mg nightly as needed.   Patient noted subsequently that temazepam was having no effect.   Patient increased gabapentin to 900 mg nightly, discontinued temazepam    *Health maintenance:  Patient notes that she has a history of colon polyps as well as ulcerative colitis and was due for a follow-up colonoscopy on 9/12/2019.  We did discuss there is no necessity to pursue colonoscopy in the setting of her metastatic breast cancer.    The patient did undergo colonoscopy on 2/7/2020 with findings of muscular hypertrophy and diverticulosis.   Patient did wish to proceed with further colonoscopic evaluation, performed on 12/20/2022 with poor prep.  Repeat 2/28/2023 with diverticulosis.    *Bilateral shoulder pain:  Chronic difficulty with right shoulder although she has not complained of this in prior  visits to our office.  She reported difficulty with abduction.    The patient did undergo MRI of her right shoulder on 11/21/2019 at an outside facility showing multiple abnormalities including supraspinatus tendinosis, labral tear but no evidence of metastatic disease.  Patient developed pain in the left shoulder, was seen by orthopedics and underwent steroid injection with some improvement.  Right shoulder stable.  Patient did undergo physical therapy with some improvement.  She continues to use tramadol 50 mg every 8-12 hours as needed.  Note that current CT 10/20/2022 made notation of left shoulder probable bursitis.    Patient continues with bilateral shoulder pain, left greater than right which does wax and wane.    *Ocular changes in part related to Xeloda:  Patient experienced a mild degree of blurred vision as well as burning and pruritus.  She was seen by her ophthalmologist and was placed on xiidra ophthalmic drops which have helped.  Likely both issues are to some extent related to Xeloda.  Symptoms did worsen and patient was seen by ophthalmologist at [facility].  She is now using an ocular lubricant at night in addition to artificial tears which have helped.  Symptoms currently stable to improved    *Elevated glucose:  It is noted the patient's glucose at the last few visits has been in the high 100 range, postprandial.  She has had a hemoglobin A1c that has been in the high 5-6 range in the past, on 5/1/2019 at 5.7  Hemoglobin A1c was last checked in 11/12/2021 at 5.8    *Possible loosening of pedicle screw at T3:  CT cervical spine 5/17/2021 showed loosening of the left pedicle screw at T3.  Patient referred to orthopedics and was seen by Dr. Nayak who felt that there were no concerning findings on the scan    *Iron deficiency anemia  Labs on 5/2/2022 with hemoglobin 10.8.  Additional labs with iron 48, ferritin 21.2, iron saturation 11%, TIBC 4 and 32, folate greater than 20, B12  "greater than 2000.    Attempted treatment with oral iron daily however patient was intolerant  Patient subsequently received Venofer 300 mg weekly x4 beginning 6/6/2022 and completed on 6/27/2022  Subsequent iron studies on 7/11/2022 showed improvement with iron 62, ferritin 345, iron saturation 18%, TIBC 336.  Hemoglobin had improved up to 12.7 at that time.  Repeat iron studies on 10/3/2022 showed iron replete status with hemoglobin 13.7, iron 121, ferritin 208.7, iron saturation 31%, TIBC 392.  Hemoglobin currently normal at 12.6     Plan:  Continue palliative single agent Xeloda 1000 mg a.m., 1500 mg in the p.m. 7 days on followed by 7 days off (patient initiated an \"off\" week today)  Proceed with monthly Xgeva today  Continue Eliquis 5 mg twice daily  The patient will continue frequent use of emollient cream currently 5 times daily and continue periodic triamcinolone cream 0.1% twice daily (provided by dermatology) 2 weeks on followed by 2 weeks off as prescribed  Continue Protonix 40 mg daily  Continue ocular lubricating gel nightly per ophthalmology  Continue Provigil 100 mg daily and Cymbalta 30 mg twice daily.  Patient continues routine follow-up with supportive oncology   Patient will continue gabapentin 900 mg at night.   Patient continues on tramadol 50 mg every 8 hours as needed for pain  Continue Percocet 5/325 every 4 hours as needed for pain (reports nausea from hydrocodone she received previously)  The patient continues follow-up with her podiatrist and brace specialist regarding callus formation and scaling in the lateral left plantar surface  On 12/13/2023 MD visit with CBC, CMP, magnesium, phosphorus and Xgeva.  1 week prior CT chest abdomen pelvis and bone scan.     Patient continuing on high risk medication requiring intensive monitoring.                                    "

## 2023-11-15 ENCOUNTER — OFFICE VISIT (OUTPATIENT)
Dept: ONCOLOGY | Facility: CLINIC | Age: 63
End: 2023-11-15
Payer: MEDICARE

## 2023-11-15 ENCOUNTER — LAB (OUTPATIENT)
Dept: LAB | Facility: HOSPITAL | Age: 63
End: 2023-11-15
Payer: MEDICARE

## 2023-11-15 ENCOUNTER — INFUSION (OUTPATIENT)
Dept: ONCOLOGY | Facility: HOSPITAL | Age: 63
End: 2023-11-15
Payer: MEDICARE

## 2023-11-15 VITALS
BODY MASS INDEX: 34.36 KG/M2 | SYSTOLIC BLOOD PRESSURE: 134 MMHG | WEIGHT: 182 LBS | DIASTOLIC BLOOD PRESSURE: 82 MMHG | HEART RATE: 93 BPM | TEMPERATURE: 98.6 F | HEIGHT: 61 IN | RESPIRATION RATE: 18 BRPM | OXYGEN SATURATION: 95 %

## 2023-11-15 DIAGNOSIS — C50.811 MALIGNANT NEOPLASM OF OVERLAPPING SITES OF RIGHT BREAST IN FEMALE, ESTROGEN RECEPTOR NEGATIVE: ICD-10-CM

## 2023-11-15 DIAGNOSIS — Z17.1 MALIGNANT NEOPLASM OF OVERLAPPING SITES OF RIGHT BREAST IN FEMALE, ESTROGEN RECEPTOR NEGATIVE: ICD-10-CM

## 2023-11-15 DIAGNOSIS — C50.811 MALIGNANT NEOPLASM OF OVERLAPPING SITES OF RIGHT BREAST IN FEMALE, ESTROGEN RECEPTOR NEGATIVE: Primary | ICD-10-CM

## 2023-11-15 DIAGNOSIS — Z17.1 MALIGNANT NEOPLASM OF OVERLAPPING SITES OF RIGHT BREAST IN FEMALE, ESTROGEN RECEPTOR NEGATIVE: Primary | ICD-10-CM

## 2023-11-15 LAB
ALBUMIN SERPL-MCNC: 3.9 G/DL (ref 3.5–5.2)
ALBUMIN/GLOB SERPL: 1.3 G/DL
ALP SERPL-CCNC: 55 U/L (ref 39–117)
ALT SERPL W P-5'-P-CCNC: 16 U/L (ref 1–33)
ANION GAP SERPL CALCULATED.3IONS-SCNC: 8.9 MMOL/L (ref 5–15)
AST SERPL-CCNC: 25 U/L (ref 1–32)
BASOPHILS # BLD AUTO: 0.05 10*3/MM3 (ref 0–0.2)
BASOPHILS NFR BLD AUTO: 0.9 % (ref 0–1.5)
BILIRUB SERPL-MCNC: 0.4 MG/DL (ref 0–1.2)
BUN SERPL-MCNC: 20 MG/DL (ref 8–23)
BUN/CREAT SERPL: 22.2 (ref 7–25)
CALCIUM SPEC-SCNC: 9.7 MG/DL (ref 8.6–10.5)
CHLORIDE SERPL-SCNC: 99 MMOL/L (ref 98–107)
CO2 SERPL-SCNC: 33.1 MMOL/L (ref 22–29)
CREAT SERPL-MCNC: 0.9 MG/DL (ref 0.6–1.1)
DEPRECATED RDW RBC AUTO: 57.1 FL (ref 37–54)
EGFRCR SERPLBLD CKD-EPI 2021: 72.4 ML/MIN/1.73
EOSINOPHIL # BLD AUTO: 0.15 10*3/MM3 (ref 0–0.4)
EOSINOPHIL NFR BLD AUTO: 2.6 % (ref 0.3–6.2)
ERYTHROCYTE [DISTWIDTH] IN BLOOD BY AUTOMATED COUNT: 15.3 % (ref 12.3–15.4)
GLOBULIN UR ELPH-MCNC: 2.9 GM/DL
GLUCOSE SERPL-MCNC: 100 MG/DL (ref 65–99)
HCT VFR BLD AUTO: 38.1 % (ref 34–46.6)
HGB BLD-MCNC: 12.6 G/DL (ref 12–15.9)
IMM GRANULOCYTES # BLD AUTO: 0 10*3/MM3 (ref 0–0.05)
IMM GRANULOCYTES NFR BLD AUTO: 0 % (ref 0–0.5)
LYMPHOCYTES # BLD AUTO: 1.4 10*3/MM3 (ref 0.7–3.1)
LYMPHOCYTES NFR BLD AUTO: 24.2 % (ref 19.6–45.3)
MAGNESIUM SERPL-MCNC: 2.1 MG/DL (ref 1.6–2.4)
MCH RBC QN AUTO: 33.8 PG (ref 26.6–33)
MCHC RBC AUTO-ENTMCNC: 33.1 G/DL (ref 31.5–35.7)
MCV RBC AUTO: 102.1 FL (ref 79–97)
MONOCYTES # BLD AUTO: 0.55 10*3/MM3 (ref 0.1–0.9)
MONOCYTES NFR BLD AUTO: 9.5 % (ref 5–12)
NEUTROPHILS NFR BLD AUTO: 3.64 10*3/MM3 (ref 1.7–7)
NEUTROPHILS NFR BLD AUTO: 62.8 % (ref 42.7–76)
NRBC BLD AUTO-RTO: 0 /100 WBC (ref 0–0.2)
PHOSPHATE SERPL-MCNC: 3.5 MG/DL (ref 2.5–4.5)
PLATELET # BLD AUTO: 244 10*3/MM3 (ref 140–450)
PMV BLD AUTO: 9.9 FL (ref 6–12)
POTASSIUM SERPL-SCNC: 5.2 MMOL/L (ref 3.5–5.2)
PROT SERPL-MCNC: 6.8 G/DL (ref 6–8.5)
RBC # BLD AUTO: 3.73 10*6/MM3 (ref 3.77–5.28)
SODIUM SERPL-SCNC: 141 MMOL/L (ref 136–145)
WBC NRBC COR # BLD: 5.79 10*3/MM3 (ref 3.4–10.8)

## 2023-11-15 PROCEDURE — 80053 COMPREHEN METABOLIC PANEL: CPT

## 2023-11-15 PROCEDURE — 96372 THER/PROPH/DIAG INJ SC/IM: CPT

## 2023-11-15 PROCEDURE — 99214 OFFICE O/P EST MOD 30 MIN: CPT | Performed by: INTERNAL MEDICINE

## 2023-11-15 PROCEDURE — 25010000002 DENOSUMAB 120 MG/1.7ML SOLUTION: Performed by: INTERNAL MEDICINE

## 2023-11-15 PROCEDURE — 1126F AMNT PAIN NOTED NONE PRSNT: CPT | Performed by: INTERNAL MEDICINE

## 2023-11-15 PROCEDURE — 85025 COMPLETE CBC W/AUTO DIFF WBC: CPT

## 2023-11-15 PROCEDURE — 83735 ASSAY OF MAGNESIUM: CPT

## 2023-11-15 PROCEDURE — 3075F SYST BP GE 130 - 139MM HG: CPT | Performed by: INTERNAL MEDICINE

## 2023-11-15 PROCEDURE — 1159F MED LIST DOCD IN RCRD: CPT | Performed by: INTERNAL MEDICINE

## 2023-11-15 PROCEDURE — 36415 COLL VENOUS BLD VENIPUNCTURE: CPT

## 2023-11-15 PROCEDURE — 3079F DIAST BP 80-89 MM HG: CPT | Performed by: INTERNAL MEDICINE

## 2023-11-15 PROCEDURE — 1160F RVW MEDS BY RX/DR IN RCRD: CPT | Performed by: INTERNAL MEDICINE

## 2023-11-15 PROCEDURE — 84100 ASSAY OF PHOSPHORUS: CPT

## 2023-11-15 RX ORDER — METHENAMINE HIPPURATE 1000 MG/1
1 TABLET ORAL DAILY
COMMUNITY
Start: 2023-10-10

## 2023-11-15 RX ADMIN — DENOSUMAB 120 MG: 120 INJECTION SUBCUTANEOUS at 17:12

## 2023-11-15 NOTE — LETTER
"November 15, 2023     Jazmine Chanel MD  [address] [id number]    Patient: Martha Davey   YOB: 1960   Date of Visit: 11/15/2023     Dear Jazmine Chanel MD:       Thank you for referring Martha Davey to me for evaluation. Below are the relevant portions of my assessment and plan of care.    If you have questions, please do not hesitate to call me. I look forward to following Martha along with you.         Sincerely,        Ubaldo Hancock MD        CC: She Brunner, Ubaldo Piña Jr., MD  11/15/23 [id number]  Sign when Signing Visit  Chief Complaint  Previous Stage Ib ([id number]) ER/SC positive, HER-2/peter negative right breast cancer with subsequent metastatic disease identified 10/8/2017, history of right pulmonary embolism, cancer related pain, chemotherapy-induced diarrhea, chemotherapy-induced mucositis, chemotherapy-induced hand-foot syndrome    Subjective       History of Present Illness  The patient returns today in delayed follow-up continuing on oral Xeloda 1000 mg in the morning, 1500 mg in the evening for 7 days on followed by 7 days off as well as monthly Xgeva and Eliquis 5 mg twice daily.  The patient completed a week of Xeloda on 11/14/2023 and is now on an \"off week\".  She continues to tolerate Xeloda reasonably well.  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  Fortunately her skin changes have improved somewhat currently, sclerodactyly does persist however patient feels that this is manageable and tolerable.  She reports a normal appetite, she reports normal bowel function.  She denies any other issues recently.  She does continue routine follow-up with supportive oncology, was last seen on 10/6/2023.      Objective  Vital Signs:   /82   Pulse 93   Temp 98.6 °F (37 °C) (Temporal)   Resp 18   Ht 154.9 cm (60.98\")   Wt 82.6 kg (182 lb)   SpO2 95%   " BMI 34.41 kg/m²     Physical Exam  Constitutional:       Appearance: She is well-developed.      Comments: Patient ambulates with the use of a cane   Eyes:      Conjunctiva/sclera: Conjunctivae normal.   Neck:      Thyroid: No thyromegaly.   Cardiovascular:      Rate and Rhythm: Normal rate and regular rhythm.      Heart sounds: No murmur heard.     No friction rub. No gallop.   Pulmonary:      Effort: No respiratory distress.      Breath sounds: Normal breath sounds.   Abdominal:      General: Bowel sounds are normal. There is no distension.      Palpations: Abdomen is soft.      Tenderness: There is no abdominal tenderness.   Musculoskeletal:      Comments: Braces in place in the bilateral lower extremities   Lymphadenopathy:      Head:      Right side of head: No submandibular adenopathy.      Cervical: No cervical adenopathy.      Upper Body:      Right upper body: No supraclavicular adenopathy.      Left upper body: No supraclavicular adenopathy.   Skin:     General: Skin is warm and dry.      Findings: Rash present.      Comments: Erythema involving the palms is currently minimal, improved.  The degree of skin peeling is mild and stable to improved.  There is minimal erythema involving the dorsal aspect of the fingers.  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      Cranial Nerves: No cranial nerve deficit.      Motor: No abnormal muscle tone.      Deep Tendon Reflexes: Reflexes normal.   Psychiatric:         Behavior: Behavior normal.         Result Review: Reviewed CBC, CMP, magnesium, phosphorus from today.     Assessment and Plan     *Previous Stage Ib ([id number]) right breast cancer:  Diagnosed May 2010 with excisional biopsy for invasive ductal carcinoma, 1.3 cm, grade 2, ER 90%, KY 80%, HER-2/peter negative (1+ IHC).    Subsequent right mastectomy in July 2010 with no residual breast malignancy, 1/5 sentinel  lymph node with micrometastasis (0.25 mm).    Treated in the [facility] system with adjuvant AC ×4 cycles in 2010 (no taxanes administered due to underlying Charcot-Saloni-Tooth with peripheral neuropathy).    Adjuvant Femara (postmenopausal) initiated October 2010 with plan to treat ×10 years.    Genetic testing reportedly negative.    Developed osteopenia treated with Prolia beginning 2/27/13. Subsequently discontinued due to identification of metastatic disease.    *Recurrent/metastatic disease identified 10/8/17:  Disease involving thoracic spine with cord compression at T6, lumbosacral involvement, sternal and right sternoclavicular involvement.    Femara discontinued in 10/2017.    Radiation administered (in the [facility] system) to the thoracic spine beginning 10/19/17 treating T3-T9 to a dose of 24 gray in 6 fractions.  Evaluation with MRI 12/8/17 showing persistent T6 cord compression with persistent neurologic compromise requiring surgical treatment 12/11/17 with T6 laminectomy/corpectomy and T3-T9 fusion.  Pathology with metastatic carcinoma of breast origin, ER negative, OH negative, HER-2/peter negative (1+ IHC).  Additional staging evaluation 12/8/17 with no evidence of visceral metastatic disease, bone scan showing involvement of thoracic spine, sternum, left humerus, mid frontal bone.  No plane film correlate of left humerus lesion.  MRI lumbar spine with small intradural L3 metastasis.  CA 15-3 12/6/17- 17.  Palliative radiation therapy to L3 dural metastasis and left humerus initiated 1/15/18 (10 fractions), completed 1/26/18.  Hypercalcemia of malignancy with calcium in the 10-11 range.  Initiation of monthly Xgeva 1/23/18.  Baseline CT scan 1/30/18 with no evidence of visceral involvement.  Cluster of nodular opacities in the right lower lobe suspected to be infectious or related to bronchiolitis. Bone scan 1/30/18 showed postsurgical change in the thoracic spine, stable uptake in the frontal bone, no new  areas of disease.  Initiation of palliative oral single agent Xeloda 2/7/18 2000 mg a.m., 1500 mg p.m. for 14/21 days.   Following 3 cycles xeloda, bone scan 4/4/18 showed no change from the prior study.  CT scan 4/4/18 showed a small pericardial effusion of unclear significance as well as a subcutaneous nodule in the right lateral chest wall.  Subsequent evaluation with echocardiogram 4/17/18 showed no evidence of pericardial effusion.  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  We discussed the possibility of surgical excision of the right subcutaneous chest wall lesion for more definitive diagnosis.  Reviewed previous CT images dating back to 12/8/17 and the lesion was present even at that time measuring around 1.7 cm although not commented on in the radiology report.  As this appears to be an indolent low-grade process unrelated to her breast cancer, recommendee foregoing surgical excision at this time and monitoring this area on future scans.  The patient agreed.    Following 6 cycles of Xeloda, CT 6/6/18  showed stable findings, no evidence of progressive disease.  There was a comment regarding subcutaneous abnormality in the anterior abdominal wall and this was related to Lovenox injection sites.  Bone scan 6/6/18 showed no interval change.   CT scan and bone scan 8/13/18 following 9 cycles of Xeloda showed stable findings with no evidence of significant visceral metastases.  Her bone lesions appear stable on bone scan.  The spindle cell neoplasm in her right chest wall actually decreased in size from 2 cm down to 1.6 cm.    The patient experienced some symptoms of diarrhea, anorexia, generalized weakness during cycle 9 Xeloda it was unclear whether this was related to a viral gastroenteritis or toxicity from treatment.  Symptoms recurred during cycle 10 and  treatment was cut short by 2 days.  Symptoms attributed to Xeloda.  With cycle 11, dose and schedule altered to 1500 mg twice daily for 7 days on followed by 7 days off .  CT scan 9/9/2020 with no significant changes.  Bone scan 9/9/2020 read as unchanged from prior studies however did note an area of slight activity in the medial left femur.  Contacted radiology and although this was not noted on prior reports appears to have been present.  Subsequent MRI left femur 9/21/2020 with cortical thickening and periosteal edema left iliac us muscle insertion to the medial left femur with no evidence of metastatic disease, favored to represent periosteal change secondary to insertional tendinitis.  Severe hand-foot symptoms causing sclerodactyly and limitation in finger movement prompted change in dosing in July 2021 with Xeloda decreased to 1000 mg a.m., 1500 mg p.m. for 7 days on followed by 7 days off.  Patient was experiencing severe hand-foot syndrome related to Xeloda having developed skin peeling, sclerodactyly with limited use of her hands.  On 3/31/2022, Xeloda was held to allow for recovery.  Patient resumed Xeloda on 4/18/2022.  Patient required hospitalization 5/29 - 6/1/2022 with presumed viral gastroenteritis that was exacerbated by Xeloda.  Xeloda held briefly, resumed on 6/6/2022.  Patient again with worsening sclerodactyly, Xeloda held for 2 weeks from 10/3 - 10/17/2022, resumed at prior dose with improvement in symptoms.  Scans on 3/3/2023 with CT chest abdomen pelvis and bone scan showing stable findings.  CT chest abdomen pelvis 7/3/2023 with questionable 1 mm change in size right lower lobe nodule.  Additional small pulmonary nodules stable.  Stable bony metastatic disease.  Bone scan on 7/7/2023 however showed slight progression focal involvement right ischium and superior pubic ramus (new ischial lesion superior pubic ramus compared to 10/24/2022 and more conspicuous compared to 3/3/2023).   "Metastatic lesion right inferior pubic ramus and ischial tuberosity increased in size since October 2022 however stable from 3/3/2023.  MRI cervical spine 7/3/2023 with no metastatic involvement however identification of the lesion at T2, recommended dedicated thoracic spine MRI to evaluate further.  Given the minimal extent and slow degree of progression, elected to continue oral Xeloda and evaluate further with MRI pelvis and thoracic spine.  Consideration of biopsy pelvic metastasis for repeat ER/NE/HER2/peter testing.    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  CA 15-3 on 7/10/2023 was 12.2, uninformative.  MRI thoracic spine 7/27/2023 with 1 cm metastatic focus seen in L1  MRI pelvis 7/27/2023 with metastatic foci identified right superior pubic ramus, right ischial tuberosity, inferior pubic ramus, L5 body.  Chronic appearing posttraumatic and/or degenerative change in the posterior right ilium adjacent SI joint.  CT-guided biopsy right pubic ramus lesion on 8/31/2023.  Pathology showed no evidence of malignancy, showed markedly calcified and sclerotic mature lamellar bone.  Attempted decalcification but no evidence of malignancy.  CT chest abdomen pelvis 9/8/2023 and bone scan 9/11/2023 with stable findings.  The patient returns today continuing on treatment with Xeloda 1000 mg a.m., 1500 mg p.m. 7 days on followed by 7 days off.  She also continues on monthly Xgeva due today.  Today she began an \"off week\" of Xeloda.  She continues to tolerate treatment reasonably well, feels that she is maintaining adequate quality of life.  Her main issue on treatment has been hand-foot syndrome, primarily affecting the hands with associated sclerodactyly.  The degree of erythema and skin peeling has improved somewhat recently, degree of sclerodactyly appears stable.  Patient reports that function in her hands is stable and acceptable.  She has no new complaints " today.  We will proceed on with treatment as planned with Xgeva and ongoing Xeloda.  She is scheduled in 3 weeks for repeat scans and I will see her as scheduled in 4 weeks to review scan results and pending results continue current regimen.    *Right pulmonary embolism:  Diagnosed on CT angiogram 10/21/17 involving small right lower lobe pulmonary artery.  Lower extremity Dopplers negative.  Bilateral lower extremity Dopplers negative again 12/5/17.  Received chronic Lovenox 1 mg/kg twice per day, transitioned to oral Eliquis in February 2019, continuing on Eliquis 5 mg twice daily.  Patient continues on Eliquis 5 mg twice daily    *Cancer related pain:  Previously receiving Duragesic 50 µg patch every 72 hours along with Dilaudid 4 mg as needed for breakthrough pain  The patient's pain improved over time and she was able to discontinue both Duragesic and Dilaudid in the interval.  Patient does take occasional Flexeril at bedtime due to back spasm/pain when she has been more active.  The patient does have some occasional aggravation of her chronic back pain and does use tramadol 50 mg every 8 hours as needed  Patient was receiving tramadol 50 mg every 8-12 hours as needed in addition to hydrocodone 5/325 every 4 hours as needed.  She was also taking OTC NSAIDs.  Patient developed nausea related to hydrocodone and was transitioned to Percocet 5/325 every 4 hours as needed, advised to stop taking NSAIDs with ongoing anticoagulation.  Patient notes that the pain is currently under fair control.    *Chemotherapy-induced diarrhea with subsequent C. difficile colitis in the setting of previous ulcerative colitis and then identification of enteropathogenic E. coli:  Patient with reported history of ulcerative colitis, continues on mesalamine.  The patient developed initial diarrhea related to Xeloda at regular dosing.  Symptoms improved on reduced dose Xeloda  Flare of symptoms in October 2018 with apparent finding of C.  difficile colitis by GI, treated with course of oral vancomycin with improvement in symptoms.  Patient notes minimal intermittent diarrhea/loose stools on Xeloda requiring occasional dosing of Imodium.    Patient required hospitalization 5/29 - 6/1/2022 with presumed viral gastroenteritis that was exacerbated by Xeloda.  Xeloda held briefly, resumed on 6/6/2022.  Patient's  developed C. difficile colitis and patient was experiencing increase in diarrhea.  Stool studies performed 8/4/22 negative C. difficile however GI PCR was positive for enteropathogenic E. coli.  Given patient's symptoms and immunocompromise status it was elected to treat her with azithromycin 500 mg daily x3 days beginning 8/5/2022  Diarrhea resolved  Patient underwent colonoscopy on 2/28/2023 with findings of diverticulosis    *Traumatic left tibia/fibular fracture:  Status post ORIF 12/6/17  Specimen was sent for pathologic review, negative for evidence of malignancy    *Hypercalcemia:  Suspect hypercalcemia of malignancy, calcium in  10-11 range previously.  Calcium normalized following initiation of monthly Xgeva on 1/23/18.    *Chemotherapy-induced mucositis:  Patient had a minimal degree of mucositis with cycle 2.  The patient has magic mouthwash to use as needed.  No subsequent mucositis.    *Recurrent UTI, bladder wall thickening on CT:  Patient had an enterococcal UTI on 3/2/18 sensitive to nitrofurantoin and received treatment, unclear how long.  Recurrent UTI 3/20/18 with urine culture growing Klebsiella, initially treated with nitrofurantoin, transitioned to Levaquin.  CT 4/4/18 with diffuse bladder wall thickening with increased nodular thickening at the left base.  Referral to urogynecology Dr. May Johnson.  She was placed on a prophylactic dose of trimethoprim 100 mg daily, bladder wall thickening felt to be related to recent recurrent urinary tract infections.  Patient with urinary symptoms, treated with course of Macrobid  at the end of December 2018, urine culture however was negative 12/31/18.  Patient was found to have Klebsiella UTI 7/29/2019 which was successfully treated with Macrobid with complete resolution of symptoms.  CT 8/10/2021 with diffuse bladder wall thickening new from 5/17/2021 (however seen on multiple prior scans).    UTI on 10/18/2021 with E. coli greater than 100,000 colonies that was pansensitive, treated with Macrobid x7 days  With ongoing/recurrent UTIs patient was seen by urogynecology and initiated suppressive therapy with trimethoprim 100 mg daily in December 2021.  She has not experienced any further urinary tract infections.  CT abdomen pelvis 3/28/2022 does show diffuse thickening of urinary bladder as has been seen on prior studies indicative of cystitis.  Patient notes that she did stop taking trimethoprim on a daily basis.    Patient with urine culture on 5/5/2023 that grew E. coli and she received antibiotic treatment from urogynecology.    Urine culture on 8/23/2023 with greater than 100,000 colonies of E. coli resistant to ampicillin and Bactrim.  Received 5-day course of Cipro with resolution of symptoms.  Patient did have UTI with E. coli on culture 10/2/2023, received a course of Augmentin.  Patient with no current symptoms of UTI    *Mobility:  The patient underwent an intensive course of rehabilitation at [facility].  She graduated from her outpatient course November 2018.  Overall the patient has improved dramatically in terms of mobility,   Patient developed significant callus formation lateral aspect of the left plantar surface.  She did meet with her brace specialist and additional padding was added in this area.  She continues to follow-up with her podiatrist Dr. Ware.    *Depression:  The patient is continuing follow-up in the supportive oncology clinic and is currently continuing on Cymbalta 30 mg twice daily as well as gabapentin 600 mg nightly, temazepam 15 mg nightly as needed,  Provigil 100 mg daily.  Patient does continue to attend support groups at [employer].  The patient does continue routine follow-up in supportive oncology clinic.    Patient does have multiple stressors with her 's malignancy and chemotherapy as well as her autistic son who is living with them at home.  Patient continues to have difficulty with stress and anxiety, is being followed by supportive oncology, last seen on 10/6/2023.    *Hand-foot syndrome secondary to Xeloda:  Patient continues with frequent application of emollient cream to the hands and feet  Symptoms increased significantly requiring a 1 week delay in cycle 18 Xeloda as noted above.  Symptoms did improve and she continued on the same dose.    Patient was referred to dermatology and has been continuing on triamcinolone 0.1% cream used for 1 week on followed by 1 week off which led to some further improvement.   Progressive palmar erythema with development of sclerodactyly and effect on dexterity.  Addition of urea-based cream 3 times daily.  Patient with continued difficulty regarding erythema of her hands that extended onto the dorsal aspect and was causing contractures/sclerodactyly in her fingers affecting her dexterity.  In July 2021, held Xeloda for an additional week and then reduced dose from 1500 mg twice daily down to 1000 mg a.m. and 1500 mg p.m. and continued on a 7-day on followed by 7-day off schedule.  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  On 3/31/2022, Xeloda was held to allow for recovery.  Patient resumed Xeloda on 4/18/2022.  Patient developed worsening sclerodactyly affecting dexterity that required Xeloda to again be briefly held for 2 weeks from 10/3 - 10/17/2022.  Treatment resumed  at prior dose after improvement in symptoms.  Patient continues to use emollient cream frequently (currently 5 times daily) and topical triamcinolone 0.1% twice daily intermittently (2 weeks on followed by 2 weeks off as instructed by dermatology).  Symptoms currently stable to improved in terms of erythema and skin peeling.  Sclerodactyly stable.  Patient reports acceptable level of functioning in regards to her hands.    *Evidence of steatosis on scans with mild intermittent elevated liver function studies:  Liver function studies increased 8/20/19 with ALT 98, AST 70, normal total bilirubin.  Negative viral hepatitis A, B, and C panel 8/23/18  Likely related to hepatic steatosis.   Subsequent improvement in LFTs  LFTs today are normal    *Chemotherapy induced leukopenia:  WBC today 5.79 with normal differential    *GERD, question of celiac disease:  Patient with significant history of reflux  Patient currently receiving Protonix 40 mg daily daily and Carafate 1 g 4 times daily as needed  Patient required hospitalization 5/29 - 6/1/2022 with presumed viral gastroenteritis that was exacerbated by Xeloda.    EGD performed 5/31/2022 during hospitalization with biopsy that showed focal blunting of villi and atrophy with slight increase in intraepithelial lymphocytes.  Changes were minimal but recommended correlation with serology to exclude celiac disease.  Celiac serology 8/8/2022 negative  Patient previously developed worsening reflux symptoms, temporarily increase Protonix to 40 mg twice daily and we did add Carafate 1 g 4 times daily.    She is taking Protonix 40 mg once daily, is not taking Carafate.    *Insomnia:  Patient with prior paradoxical reaction to Benadryl  Improved previously on temazepam 15 mg nightly as needed.   Patient noted subsequently that temazepam was having no effect.   Patient increased gabapentin to 900 mg nightly, discontinued temazepam    *Health maintenance:  Patient notes that she has a  history of colon polyps as well as ulcerative colitis and was due for a follow-up colonoscopy on 9/12/2019.  We did discuss there is no necessity to pursue colonoscopy in the setting of her metastatic breast cancer.    The patient did undergo colonoscopy on 2/7/2020 with findings of muscular hypertrophy and diverticulosis.   Patient did wish to proceed with further colonoscopic evaluation, performed on 12/20/2022 with poor prep.  Repeat 2/28/2023 with diverticulosis.    *Bilateral shoulder pain:  Chronic difficulty with right shoulder although she has not complained of this in prior visits to our office.  She reported difficulty with abduction.    The patient did undergo MRI of her right shoulder on 11/21/2019 at an outside facility showing multiple abnormalities including supraspinatus tendinosis, labral tear but no evidence of metastatic disease.  Patient developed pain in the left shoulder, was seen by orthopedics and underwent steroid injection with some improvement.  Right shoulder stable.  Patient did undergo physical therapy with some improvement.  She continues to use tramadol 50 mg every 8-12 hours as needed.  Note that current CT 10/20/2022 made notation of left shoulder probable bursitis.    Patient continues with bilateral shoulder pain, left greater than right which does wax and wane.    *Ocular changes in part related to Xeloda:  Patient experienced a mild degree of blurred vision as well as burning and pruritus.  She was seen by her ophthalmologist and was placed on xiidra ophthalmic drops which have helped.  Likely both issues are to some extent related to Xeloda.  Symptoms did worsen and patient was seen by ophthalmologist at [facility].  She is now using an ocular lubricant at night in addition to artificial tears which have helped.  Symptoms currently stable to improved    *Elevated glucose:  It is noted the patient's glucose at the last few visits has been in the high 100 range,  "postprandial.  She has had a hemoglobin A1c that has been in the high 5-6 range in the past, on 5/1/2019 at 5.7  Hemoglobin A1c was last checked in 11/12/2021 at 5.8    *Possible loosening of pedicle screw at T3:  CT cervical spine 5/17/2021 showed loosening of the left pedicle screw at T3.  Patient referred to orthopedics and was seen by Dr. Nayak who felt that there were no concerning findings on the scan    *Iron deficiency anemia  Labs on 5/2/2022 with hemoglobin 10.8.  Additional labs with iron 48, ferritin 21.2, iron saturation 11%, TIBC 4 and 32, folate greater than 20, B12 greater than 2000.    Attempted treatment with oral iron daily however patient was intolerant  Patient subsequently received Venofer 300 mg weekly x4 beginning 6/6/2022 and completed on 6/27/2022  Subsequent iron studies on 7/11/2022 showed improvement with iron 62, ferritin 345, iron saturation 18%, TIBC 336.  Hemoglobin had improved up to 12.7 at that time.  Repeat iron studies on 10/3/2022 showed iron replete status with hemoglobin 13.7, iron 121, ferritin 208.7, iron saturation 31%, TIBC 392.  Hemoglobin currently normal at 12.6     Plan:  Continue palliative single agent Xeloda 1000 mg a.m., 1500 mg in the p.m. 7 days on followed by 7 days off (patient initiated an \"off\" week today)  Proceed with monthly Xgeva today  Continue Eliquis 5 mg twice daily  The patient will continue frequent use of emollient cream currently 5 times daily and continue periodic triamcinolone cream 0.1% twice daily (provided by dermatology) 2 weeks on followed by 2 weeks off as prescribed  Continue Protonix 40 mg daily  Continue ocular lubricating gel nightly per ophthalmology  Continue Provigil 100 mg daily and Cymbalta 30 mg twice daily.  Patient continues routine follow-up with supportive oncology   Patient will continue gabapentin 900 mg at night.   Patient continues on tramadol 50 mg every 8 hours as needed for pain  Continue Percocet 5/325 every 4 hours " as needed for pain (reports nausea from hydrocodone she received previously)  The patient continues follow-up with her podiatrist and brace specialist regarding callus formation and scaling in the lateral left plantar surface  On 12/13/2023 MD visit with CBC, CMP, magnesium, phosphorus and Xgeva.  1 week prior CT chest abdomen pelvis and bone scan.     Patient continuing on high risk medication requiring intensive monitoring.

## 2023-11-16 ENCOUNTER — SPECIALTY PHARMACY (OUTPATIENT)
Dept: PHARMACY | Facility: HOSPITAL | Age: 63
End: 2023-11-16
Payer: MEDICARE

## 2023-11-21 RX ORDER — GABAPENTIN 300 MG/1
300 CAPSULE ORAL 3 TIMES DAILY
Qty: 270 CAPSULE | Refills: 3 | Status: SHIPPED | OUTPATIENT
Start: 2023-11-21

## 2023-11-27 RX ORDER — OXYCODONE HYDROCHLORIDE AND ACETAMINOPHEN 5; 325 MG/1; MG/1
1 TABLET ORAL EVERY 4 HOURS PRN
Qty: 60 TABLET | Refills: 0 | Status: SHIPPED | OUTPATIENT
Start: 2023-11-27

## 2023-11-29 ENCOUNTER — OFFICE VISIT (OUTPATIENT)
Dept: PSYCHIATRY | Facility: HOSPITAL | Age: 63
End: 2023-11-29
Payer: MEDICARE

## 2023-11-29 DIAGNOSIS — R53.0 NEOPLASTIC MALIGNANT RELATED FATIGUE: ICD-10-CM

## 2023-11-29 DIAGNOSIS — F33.1 MAJOR DEPRESSIVE DISORDER, RECURRENT EPISODE, MODERATE: ICD-10-CM

## 2023-11-29 DIAGNOSIS — C50.919 PRIMARY MALIGNANT NEOPLASM OF BREAST WITH METASTASIS: Primary | ICD-10-CM

## 2023-11-29 RX ORDER — MODAFINIL 200 MG/1
200 TABLET ORAL DAILY
Qty: 30 TABLET | Refills: 2 | Status: SHIPPED | OUTPATIENT
Start: 2023-11-29

## 2023-11-29 NOTE — PROGRESS NOTES
[facility]  In Person Session    Subjective  Patient ID: Martha Davey is a 63 y.o. female is seen face to face in the [facility].    Pt and  seen together for both patient and 's appointment, at their request.    CC: Anxiety, grief    HPI/ Interval History:   Pt is seen in follow up regarding sx of depression and anxiety amidst survivorship of metastatic cancer, caregiver responsibilities associated with 's cancer and needs of other loved ones. Sx reportedly stable, remaining on duloxetine, gabapentin per PCP. Continues to take modafinil, appreciating benefit to energy and able engagement during day. Had to reschedule sleep med follow up, anticipating appt in late December. Sleep is effective with use of gabapentin, no longer taking temazepam.    Today is patient's birthday, which brings up feelings of disappointment. Does report making plans with friend tonight and is looking forward to this.  Continues to have some friends, interacting with them intermittently. Maintains goals as caregiver, talking to others and trying to make others smile. Politics remain devisive factor in home.    Exam: As above    Recent Labs Reviewed:  Lab on 11/15/2023   Component Date Value    Glucose 11/15/2023 100 (H)     BUN 11/15/2023 20     Creatinine 11/15/2023 0.90     Sodium 11/15/2023 141     Potassium 11/15/2023 5.2     Chloride 11/15/2023 99     CO2 11/15/2023 33.1 (H)     Calcium 11/15/2023 9.7     Total Protein 11/15/2023 6.8     Albumin 11/15/2023 3.9     ALT (SGPT) 11/15/2023 16     AST (SGOT) 11/15/2023 25     Alkaline Phosphatase 11/15/2023 55     Total Bilirubin 11/15/2023 0.4     Globulin 11/15/2023 2.9     A/G Ratio 11/15/2023 1.3     BUN/Creatinine Ratio 11/15/2023 22.2     Anion Gap 11/15/2023 8.9     eGFR 11/15/2023 72.4     Magnesium 11/15/2023 2.1     Phosphorus 11/15/2023 3.5     WBC 11/15/2023 5.79     RBC 11/15/2023 3.73 (L)     Hemoglobin  11/15/2023 12.6     Hematocrit 11/15/2023 38.1     MCV 11/15/2023 102.1 (H)     MCH 11/15/2023 33.8 (H)     MCHC 11/15/2023 33.1     RDW 11/15/2023 15.3     RDW-SD 11/15/2023 57.1 (H)     MPV 11/15/2023 9.9     Platelets 11/15/2023 244     Neutrophil % 11/15/2023 62.8     Lymphocyte % 11/15/2023 24.2     Monocyte % 11/15/2023 9.5     Eosinophil % 11/15/2023 2.6     Basophil % 11/15/2023 0.9     Immature Grans % 11/15/2023 0.0     Neutrophils, Absolute 11/15/2023 3.64     Lymphocytes, Absolute 11/15/2023 1.40     Monocytes, Absolute 11/15/2023 0.55     Eosinophils, Absolute 11/15/2023 0.15     Basophils, Absolute 11/15/2023 0.05     Immature Grans, Absolute 11/15/2023 0.00     nRBC 11/15/2023 0.0    Lab on 10/18/2023   Component Date Value    Glucose 10/18/2023 128 (H)     BUN 10/18/2023 23     Creatinine 10/18/2023 0.92     Sodium 10/18/2023 144     Potassium 10/18/2023 4.3     Chloride 10/18/2023 102     CO2 10/18/2023 32.0 (H)     Calcium 10/18/2023 9.7     Total Protein 10/18/2023 6.8     Albumin 10/18/2023 4.1     ALT (SGPT) 10/18/2023 13     AST (SGOT) 10/18/2023 25     Alkaline Phosphatase 10/18/2023 63     Total Bilirubin 10/18/2023 0.4     Globulin 10/18/2023 2.7     A/G Ratio 10/18/2023 1.5     BUN/Creatinine Ratio 10/18/2023 25.0     Anion Gap 10/18/2023 10.0     eGFR 10/18/2023 70.5     Magnesium 10/18/2023 2.2     Phosphorus 10/18/2023 3.8     WBC 10/18/2023 3.68     RBC 10/18/2023 3.73 (L)     Hemoglobin 10/18/2023 12.2     Hematocrit 10/18/2023 38.3     MCV 10/18/2023 102.7 (H)     MCH 10/18/2023 32.7     MCHC 10/18/2023 31.9     RDW 10/18/2023 15.9 (H)     RDW-SD 10/18/2023 60.9 (H)     MPV 10/18/2023 10.7     Platelets 10/18/2023 276     Neutrophil % 10/18/2023 51.3     Lymphocyte % 10/18/2023 30.7     Monocyte % 10/18/2023 10.9     Eosinophil % 10/18/2023 4.9     Basophil % 10/18/2023 1.9 (H)     Immature Grans % 10/18/2023 0.3     Neutrophils, Absolute 10/18/2023 1.89     Lymphocytes, Absolute  10/18/2023 1.13     Monocytes, Absolute 10/18/2023 0.40     Eosinophils, Absolute 10/18/2023 0.18     Basophils, Absolute 10/18/2023 0.07     Immature Grans, Absolute 10/18/2023 0.01     nRBC 10/18/2023 0.0    Admission on 10/02/2023, Discharged on 10/02/2023   Component Date Value    Color 10/02/2023 Yellow     Clarity, UA 10/02/2023 Cloudy (A)     Glucose, UA 10/02/2023 Negative     Bilirubin 10/02/2023 Negative     Ketones, UA 10/02/2023 Negative     Specific Gravity  10/02/2023 1.025     Blood, UA 10/02/2023 3+ (A)     pH, Urine 10/02/2023 5.5     Protein, POC 10/02/2023 3+ (A)     Urobilinogen, UA 10/02/2023 Normal     Nitrite, UA 10/02/2023 Positive (A)     Leukocytes 10/02/2023 Large (3+) (A)     Urine Culture 10/02/2023 Final report (A)     Result 1 10/02/2023 Escherichia coli (A)     Susceptibility Testing 10/02/2023 Comment    Labs reviewed    Current Psychotropic Medications:  Duloxetine 30 mg bid  Gabapentin  Modafinil    Plan of Care/ Medical Decision Making  Continue medications as written.  Reiterated importance of sleep medicine referral, education continued regarding risks of untreated MARIA M.   Counseling continued regarding effective communication strategies, current coping strategies.  FU scheduled in 4-6 weeks; plan for individual visits vs seeing patient and spouse together.    Diagnoses and all orders for this visit:    1. Metastatic breast cancer (Primary)    2. Major depressive disorder, recurrent episode, moderate  -     modafinil (PROVIGIL) 200 MG tablet; Take 1 tablet by mouth Daily.  Dispense: 30 tablet; Refill: 2    3. Neoplastic malignant related fatigue  -     modafinil (PROVIGIL) 200 MG tablet; Take 1 tablet by mouth Daily.  Dispense: 30 tablet; Refill: 2    I spent 31 minutes caring for Martha on this date of service. This time includes time spent by me in the following activities: preparing for the visit, reviewing tests, obtaining and/or reviewing a separately obtained history,  performing a medically appropriate examination and/or evaluation, counseling and educating the patient/family/caregiver, ordering medications, tests, or procedures, and documenting information in the medical record.

## 2023-12-07 DIAGNOSIS — R73.9 HYPERGLYCEMIA: ICD-10-CM

## 2023-12-07 DIAGNOSIS — I10 HYPERTENSION, UNSPECIFIED TYPE: ICD-10-CM

## 2023-12-07 DIAGNOSIS — E78.5 HYPERLIPIDEMIA, UNSPECIFIED HYPERLIPIDEMIA TYPE: Primary | ICD-10-CM

## 2023-12-08 ENCOUNTER — HOSPITAL ENCOUNTER (OUTPATIENT)
Dept: CT IMAGING | Facility: HOSPITAL | Age: 63
Discharge: HOME | End: 2023-12-08
Admitting: INTERNAL MEDICINE
Payer: MEDICARE

## 2023-12-08 DIAGNOSIS — Z17.1 MALIGNANT NEOPLASM OF OVERLAPPING SITES OF RIGHT BREAST IN FEMALE, ESTROGEN RECEPTOR NEGATIVE: ICD-10-CM

## 2023-12-08 DIAGNOSIS — C50.811 MALIGNANT NEOPLASM OF OVERLAPPING SITES OF RIGHT BREAST IN FEMALE, ESTROGEN RECEPTOR NEGATIVE: ICD-10-CM

## 2023-12-08 LAB — CREAT BLDA-MCNC: 1.1 MG/DL (ref 0.6–1.3)

## 2023-12-08 PROCEDURE — 25510000001 IOPAMIDOL 61 % SOLUTION: Performed by: INTERNAL MEDICINE

## 2023-12-08 PROCEDURE — 71260 CT THORAX DX C+: CPT

## 2023-12-08 PROCEDURE — 82565 ASSAY OF CREATININE: CPT

## 2023-12-08 PROCEDURE — 74177 CT ABD & PELVIS W/CONTRAST: CPT

## 2023-12-08 RX ADMIN — IOPAMIDOL 85 ML: 612 INJECTION, SOLUTION INTRAVENOUS at 11:12

## 2023-12-11 ENCOUNTER — HOSPITAL ENCOUNTER (OUTPATIENT)
Dept: NUCLEAR MEDICINE | Facility: HOSPITAL | Age: 63
Discharge: HOME | End: 2023-12-11
Payer: MEDICARE

## 2023-12-11 DIAGNOSIS — Z17.1 MALIGNANT NEOPLASM OF OVERLAPPING SITES OF RIGHT BREAST IN FEMALE, ESTROGEN RECEPTOR NEGATIVE: ICD-10-CM

## 2023-12-11 DIAGNOSIS — C50.811 MALIGNANT NEOPLASM OF OVERLAPPING SITES OF RIGHT BREAST IN FEMALE, ESTROGEN RECEPTOR NEGATIVE: ICD-10-CM

## 2023-12-11 PROCEDURE — 78306 BONE IMAGING WHOLE BODY: CPT

## 2023-12-11 PROCEDURE — A9503 TC99M MEDRONATE: HCPCS | Performed by: INTERNAL MEDICINE

## 2023-12-11 PROCEDURE — 0 TECHNETIUM MEDRONATE KIT: Performed by: INTERNAL MEDICINE

## 2023-12-11 RX ORDER — TC 99M MEDRONATE 20 MG/10ML
22.3 INJECTION, POWDER, LYOPHILIZED, FOR SOLUTION INTRAVENOUS
Status: COMPLETED | OUTPATIENT
Start: 2023-12-11 | End: 2023-12-11

## 2023-12-11 RX ORDER — MESALAMINE 1.2 G/1
TABLET, DELAYED RELEASE ORAL
Qty: 180 TABLET | Refills: 3 | Status: SHIPPED | OUTPATIENT
Start: 2023-12-11

## 2023-12-11 RX ADMIN — Medication 22.3 MILLICURIE: at 10:15

## 2023-12-12 NOTE — PROGRESS NOTES
"Chief Complaint  Previous Stage Ib ([id number]) ER/GA positive, HER-2/peter negative right breast cancer with subsequent metastatic disease identified 10/8/2017, history of right pulmonary embolism, cancer related pain, chemotherapy-induced diarrhea, chemotherapy-induced mucositis, chemotherapy-induced hand-foot syndrome    Subjective        History of Present Illness  The patient returns today in delayed follow-up continuing on oral Xeloda 1000 mg in the morning, 1500 mg in the evening for 7 days on followed by 7 days off as well as monthly Xgeva and Eliquis 5 mg twice daily.  The patient is continuing to tolerate treatment relatively well.  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  She reports that symptoms are fairly stable at this time, some chronic effect on her dexterity which is unchanged.  She does have some soft stool related to weeks when she is on Xeloda as well as her underlying ulcerative colitis.  She does continue on mesalamine.  Bowel function has been stable recently.  She does have issue with ongoing intermittent urinary tract infections managed by urogynecology, reports that she just finished a course of antibiotics recently.  She reports that she did have the RSV vaccine 2 weeks ago just prior to her scans and did feel poorly the day after the vaccination.  She does maintain ECOG performance status of 1.  She notes that she has not been walking as much recently due to her 's health issues.  He is a patient in our practice and has advanced malignancy and is not doing well currently.  She has been struggling with this, does continue with routine follow-up with supportive oncology, last seen on 12/1/2023 and has found this very helpful.      Objective   Vital Signs:   /83   Pulse 89   Temp 98 °F (36.7 °C) (Temporal)   Resp 18   Ht 154.9 cm (60.98\")   Wt 82.1 kg (180 lb 14.4 oz)   SpO2 96%   BMI 34.20 " kg/m²     Physical Exam  Constitutional:       Appearance: She is well-developed.      Comments: Patient ambulates with the use of a cane   Eyes:      Conjunctiva/sclera: Conjunctivae normal.   Neck:      Thyroid: No thyromegaly.   Cardiovascular:      Rate and Rhythm: Normal rate and regular rhythm.      Heart sounds: No murmur heard.     No friction rub. No gallop.   Pulmonary:      Effort: No respiratory distress.      Breath sounds: Normal breath sounds.   Abdominal:      General: Bowel sounds are normal. There is no distension.      Palpations: Abdomen is soft.      Tenderness: There is no abdominal tenderness.   Musculoskeletal:      Comments: Braces in place in the bilateral lower extremities   Lymphadenopathy:      Head:      Right side of head: No submandibular adenopathy.      Cervical: No cervical adenopathy.      Upper Body:      Right upper body: No supraclavicular adenopathy.      Left upper body: No supraclavicular adenopathy.   Skin:     General: Skin is warm and dry.      Findings: Rash present.      Comments: Erythema involving the palms is currently minimal.  The degree of skin peeling is mild and stable to improved.  There is minimal erythema involving the dorsal aspect of the fingers.  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      Cranial Nerves: No cranial nerve deficit.      Motor: No abnormal muscle tone.      Deep Tendon Reflexes: Reflexes normal.   Psychiatric:         Behavior: Behavior normal.     Patient was examined today, unchanged from above    Result Review : Reviewed CBC, CMP, magnesium, phosphorus from today.  Reviewed CT chest abdomen pelvis from 12/8/2023 and bone scan from 12/11/2023     Assessment and Plan     *Previous Stage Ib ([id number]) right breast cancer:  Diagnosed May 2010 with excisional biopsy for invasive ductal carcinoma, 1.3 cm, grade 2, ER 90%, UT 80%, HER-2/peter  negative (1+ IHC).    Subsequent right mastectomy in July 2010 with no residual breast malignancy, 1/5 sentinel lymph node with micrometastasis (0.25 mm).    Treated in the [facility] system with adjuvant AC ×4 cycles in 2010 (no taxanes administered due to underlying Charcot-Saloni-Tooth with peripheral neuropathy).    Adjuvant Femara (postmenopausal) initiated October 2010 with plan to treat ×10 years.    Genetic testing reportedly negative.    Developed osteopenia treated with Prolia beginning 2/27/13. Subsequently discontinued due to identification of metastatic disease.    *Recurrent/metastatic disease identified 10/8/17:  Disease involving thoracic spine with cord compression at T6, lumbosacral involvement, sternal and right sternoclavicular involvement.    Femara discontinued in 10/2017.    Radiation administered (in the [facility] system) to the thoracic spine beginning 10/19/17 treating T3-T9 to a dose of 24 gray in 6 fractions.  Evaluation with MRI 12/8/17 showing persistent T6 cord compression with persistent neurologic compromise requiring surgical treatment 12/11/17 with T6 laminectomy/corpectomy and T3-T9 fusion.  Pathology with metastatic carcinoma of breast origin, ER negative, CT negative, HER-2/peter negative (1+ IHC).  Additional staging evaluation 12/8/17 with no evidence of visceral metastatic disease, bone scan showing involvement of thoracic spine, sternum, left humerus, mid frontal bone.  No plane film correlate of left humerus lesion.  MRI lumbar spine with small intradural L3 metastasis.  CA 15-3 12/6/17- 17.  Palliative radiation therapy to L3 dural metastasis and left humerus initiated 1/15/18 (10 fractions), completed 1/26/18.  Hypercalcemia of malignancy with calcium in the 10-11 range.  Initiation of monthly Xgeva 1/23/18.  Baseline CT scan 1/30/18 with no evidence of visceral involvement.  Cluster of nodular opacities in the right lower lobe suspected to be infectious or related to  bronchiolitis. Bone scan 1/30/18 showed postsurgical change in the thoracic spine, stable uptake in the frontal bone, no new areas of disease.  Initiation of palliative oral single agent Xeloda 2/7/18 2000 mg a.m., 1500 mg p.m. for 14/21 days.   Following 3 cycles xeloda, bone scan 4/4/18 showed no change from the prior study.  CT scan 4/4/18 showed a small pericardial effusion of unclear significance as well as a subcutaneous nodule in the right lateral chest wall.  Subsequent evaluation with echocardiogram 4/17/18 showed no evidence of pericardial effusion.  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  We discussed the possibility of surgical excision of the right subcutaneous chest wall lesion for more definitive diagnosis.  Reviewed previous CT images dating back to 12/8/17 and the lesion was present even at that time measuring around 1.7 cm although not commented on in the radiology report.  As this appears to be an indolent low-grade process unrelated to her breast cancer, recommendee foregoing surgical excision at this time and monitoring this area on future scans.  The patient agreed.    Following 6 cycles of Xeloda, CT 6/6/18  showed stable findings, no evidence of progressive disease.  There was a comment regarding subcutaneous abnormality in the anterior abdominal wall and this was related to Lovenox injection sites.  Bone scan 6/6/18 showed no interval change.   CT scan and bone scan 8/13/18 following 9 cycles of Xeloda showed stable findings with no evidence of significant visceral metastases.  Her bone lesions appear stable on bone scan.  The spindle cell neoplasm in her right chest wall actually decreased in size from 2 cm down to 1.6 cm.    The patient experienced some symptoms of diarrhea, anorexia, generalized weakness during cycle 9 Xeloda it was unclear  whether this was related to a viral gastroenteritis or toxicity from treatment.  Symptoms recurred during cycle 10 and treatment was cut short by 2 days.  Symptoms attributed to Xeloda.  With cycle 11, dose and schedule altered to 1500 mg twice daily for 7 days on followed by 7 days off .  CT scan 9/9/2020 with no significant changes.  Bone scan 9/9/2020 read as unchanged from prior studies however did note an area of slight activity in the medial left femur.  Contacted radiology and although this was not noted on prior reports appears to have been present.  Subsequent MRI left femur 9/21/2020 with cortical thickening and periosteal edema left iliac us muscle insertion to the medial left femur with no evidence of metastatic disease, favored to represent periosteal change secondary to insertional tendinitis.  Severe hand-foot symptoms causing sclerodactyly and limitation in finger movement prompted change in dosing in July 2021 with Xeloda decreased to 1000 mg a.m., 1500 mg p.m. for 7 days on followed by 7 days off.  Patient was experiencing severe hand-foot syndrome related to Xeloda having developed skin peeling, sclerodactyly with limited use of her hands.  On 3/31/2022, Xeloda was held to allow for recovery.  Patient resumed Xeloda on 4/18/2022.  Patient required hospitalization 5/29 - 6/1/2022 with presumed viral gastroenteritis that was exacerbated by Xeloda.  Xeloda held briefly, resumed on 6/6/2022.  Patient again with worsening sclerodactyly, Xeloda held for 2 weeks from 10/3 - 10/17/2022, resumed at prior dose with improvement in symptoms.  Scans on 3/3/2023 with CT chest abdomen pelvis and bone scan showing stable findings.  CT chest abdomen pelvis 7/3/2023 with questionable 1 mm change in size right lower lobe nodule.  Additional small pulmonary nodules stable.  Stable bony metastatic disease.  Bone scan on 7/7/2023 however showed slight progression focal involvement right ischium and superior pubic ramus  (new ischial lesion superior pubic ramus compared to 10/24/2022 and more conspicuous compared to 3/3/2023).  Metastatic lesion right inferior pubic ramus and ischial tuberosity increased in size since October 2022 however stable from 3/3/2023.  MRI cervical spine 7/3/2023 with no metastatic involvement however identification of the lesion at T2, recommended dedicated thoracic spine MRI to evaluate further.  Given the minimal extent and slow degree of progression, elected to continue oral Xeloda and evaluate further with MRI pelvis and thoracic spine.  Consideration of biopsy pelvic metastasis for repeat ER/DE/HER2/peter testing.    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  CA 15-3 on 7/10/2023 was 12.2, uninformative.  MRI thoracic spine 7/27/2023 with 1 cm metastatic focus seen in L1  MRI pelvis 7/27/2023 with metastatic foci identified right superior pubic ramus, right ischial tuberosity, inferior pubic ramus, L5 body.  Chronic appearing posttraumatic and/or degenerative change in the posterior right ilium adjacent SI joint.  CT-guided biopsy right pubic ramus lesion on 8/31/2023.  Pathology showed no evidence of malignancy, showed markedly calcified and sclerotic mature lamellar bone.  Attempted decalcification but no evidence of malignancy.  CT chest abdomen pelvis 9/8/2023 and bone scan 9/11/2023 with stable findings.  CT chest abdomen pelvis 12/8/2023 with possible slight increase in right chest wall subcutaneous lesion (1.8 x 1.1 up to 1.8 x 1.4 cm) although may be related to altered positioning.  Nonpathologically enlarged right axillary and subpectoral lymph nodes slightly increased (patient notes she received RSV vaccine right arm just prior to scan).  Bone scan 12/11/2023 with stable findings.  The patient returns today continuing on treatment with Xeloda 1000 mg a.m., 1500 mg p.m. 7 days on followed by 7 days off.  She also continues on monthly  Xgeva due today.  She continues to tolerate treatment reasonably well.  Her hands are fairly stable with minimal erythema, improvement in scaling/peeling and stable sclerodactyly.  Patient reports that the chronic effect on her dexterity is stable and tolerable.  We reviewed her scans above with CT chest abdomen pelvis 12/8/2023 that showed stable findings however did note possible increase in the right chest wall subcutaneous lesion (low-grade spindle cell neoplasm with negative breast markers by prior biopsy) although notes that this change may be accounted for by change in position.  There was also notation of slight increase in size of nonpathologically enlarged right axillary and subpectoral lymph nodes.  Patient reports that around 1 week prior to the scan she underwent RSV vaccine in the right arm and did have a reaction, feeling poorly the following day and this may account for the slight increase in lymph nodes.  Overall findings are stable, bone scan with stable findings from 12/11/2023.  I did recommend that we continue on with treatment as planned and repeat scans at the 3-month interval.  We also discussed transitioning her Xgeva to every 3-month dosing given the lengthy duration of treatment.  She will return in 6 weeks for NP visit and I will see her in 3 months when she is due for Xgeva just prior to that visit she will undergo CT scans and bone scan.    *Right pulmonary embolism:  Diagnosed on CT angiogram 10/21/17 involving small right lower lobe pulmonary artery.  Lower extremity Dopplers negative.  Bilateral lower extremity Dopplers negative again 12/5/17.  Received chronic Lovenox 1 mg/kg twice per day, transitioned to oral Eliquis in February 2019, continuing on Eliquis 5 mg twice daily.  Patient continues on Eliquis 5 mg twice daily    *Cancer related pain:  Previously receiving Duragesic 50 µg patch every 72 hours along with Dilaudid 4 mg as needed for breakthrough pain  The patient's pain  improved over time and she was able to discontinue both Duragesic and Dilaudid in the interval.  Patient does take occasional Flexeril at bedtime due to back spasm/pain when she has been more active.  The patient does have some occasional aggravation of her chronic back pain and does use tramadol 50 mg every 8 hours as needed  Patient was receiving tramadol 50 mg every 8-12 hours as needed in addition to hydrocodone 5/325 every 4 hours as needed.  She was also taking OTC NSAIDs.  Patient developed nausea related to hydrocodone and was transitioned to Percocet 5/325 every 4 hours as needed, advised to stop taking NSAIDs with ongoing anticoagulation.  Patient notes that the pain is currently under fair control.    *Chemotherapy-induced diarrhea with subsequent C. difficile colitis in the setting of previous ulcerative colitis and then identification of enteropathogenic E. coli:  Patient with reported history of ulcerative colitis, continues on mesalamine.  The patient developed initial diarrhea related to Xeloda at regular dosing.  Symptoms improved on reduced dose Xeloda  Flare of symptoms in October 2018 with apparent finding of C. difficile colitis by GI, treated with course of oral vancomycin with improvement in symptoms.  Patient notes minimal intermittent diarrhea/loose stools on Xeloda requiring occasional dosing of Imodium.    Patient required hospitalization 5/29 - 6/1/2022 with presumed viral gastroenteritis that was exacerbated by Xeloda.  Xeloda held briefly, resumed on 6/6/2022.  Patient's  developed C. difficile colitis and patient was experiencing increase in diarrhea.  Stool studies performed 8/4/22 negative C. difficile however GI PCR was positive for enteropathogenic E. coli.  Given patient's symptoms and immunocompromise status it was elected to treat her with azithromycin 500 mg daily x3 days beginning 8/5/2022  Diarrhea resolved  Patient underwent colonoscopy on 2/28/2023 with findings of  diverticulosis  Patient notes that bowel function has been stable, does have some soft stools the week on treatment with Xeloda.    *Traumatic left tibia/fibular fracture:  Status post ORIF 12/6/17  Specimen was sent for pathologic review, negative for evidence of malignancy    *Hypercalcemia:  Suspect hypercalcemia of malignancy, calcium in  10-11 range previously.  Calcium normalized following initiation of monthly Xgeva on 1/23/18.    *Chemotherapy-induced mucositis:  Patient had a minimal degree of mucositis with cycle 2.  The patient has magic mouthwash to use as needed.  No subsequent mucositis.    *Recurrent UTI, bladder wall thickening on CT:  Patient had an enterococcal UTI on 3/2/18 sensitive to nitrofurantoin and received treatment, unclear how long.  Recurrent UTI 3/20/18 with urine culture growing Klebsiella, initially treated with nitrofurantoin, transitioned to Levaquin.  CT 4/4/18 with diffuse bladder wall thickening with increased nodular thickening at the left base.  Referral to urogynecology Dr. May Johnson.  She was placed on a prophylactic dose of trimethoprim 100 mg daily, bladder wall thickening felt to be related to recent recurrent urinary tract infections.  Patient with urinary symptoms, treated with course of Macrobid at the end of December 2018, urine culture however was negative 12/31/18.  Patient was found to have Klebsiella UTI 7/29/2019 which was successfully treated with Macrobid with complete resolution of symptoms.  CT 8/10/2021 with diffuse bladder wall thickening new from 5/17/2021 (however seen on multiple prior scans).    UTI on 10/18/2021 with E. coli greater than 100,000 colonies that was pansensitive, treated with Macrobid x7 days  With ongoing/recurrent UTIs patient was seen by urogynecology and initiated suppressive therapy with trimethoprim 100 mg daily in December 2021.  She has not experienced any further urinary tract infections.  CT abdomen pelvis 3/28/2022 does show  diffuse thickening of urinary bladder as has been seen on prior studies indicative of cystitis.  Patient notes that she did stop taking trimethoprim on a daily basis.    Patient with urine culture on 5/5/2023 that grew E. coli and she received antibiotic treatment from urogynecology.    Urine culture on 8/23/2023 with greater than 100,000 colonies of E. coli resistant to ampicillin and Bactrim.  Received 5-day course of Cipro with resolution of symptoms.  Patient did have UTI with E. coli on culture 10/2/2023, received a course of Augmentin.  Patient reports that she has just recently finished a course of antibiotics prescribed by urogynecology.    *Mobility:  The patient underwent an intensive course of rehabilitation at [facility].  She graduated from her outpatient course November 2018.  Overall the patient has improved dramatically in terms of mobility,   Patient developed significant callus formation lateral aspect of the left plantar surface.  She did meet with her brace specialist and additional padding was added in this area.  She continues to follow-up with her podiatrist Dr. Ware.    *Depression:  The patient is continuing follow-up in the supportive oncology clinic and is currently continuing on Cymbalta 30 mg twice daily as well as gabapentin 600 mg nightly, temazepam 15 mg nightly as needed, Provigil 100 mg daily.  Patient does continue to attend support groups at [employer].  The patient does continue routine follow-up in supportive oncology clinic.    Patient does have multiple stressors with her 's malignancy and chemotherapy as well as her autistic son who is living with them at home.  Patient continues to have difficulty with stress and anxiety, is being followed by supportive oncology, last seen on 12/1/2023.    *Hand-foot syndrome secondary to Xeloda:  Patient continues with frequent application of emollient cream to the hands and feet  Symptoms increased significantly requiring a 1 week  delay in cycle 18 Xeloda as noted above.  Symptoms did improve and she continued on the same dose.    Patient was referred to dermatology and has been continuing on triamcinolone 0.1% cream used for 1 week on followed by 1 week off which led to some further improvement.   Progressive palmar erythema with development of sclerodactyly and effect on dexterity.  Addition of urea-based cream 3 times daily.  Patient with continued difficulty regarding erythema of her hands that extended onto the dorsal aspect and was causing contractures/sclerodactyly in her fingers affecting her dexterity.  In July 2021, held Xeloda for an additional week and then reduced dose from 1500 mg twice daily down to 1000 mg a.m. and 1500 mg p.m. and continued on a 7-day on followed by 7-day off schedule.  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  On 3/31/2022, Xeloda was held to allow for recovery.  Patient resumed Xeloda on 4/18/2022.  Patient developed worsening sclerodactyly affecting dexterity that required Xeloda to again be briefly held for 2 weeks from 10/3 - 10/17/2022.  Treatment resumed at prior dose after improvement in symptoms.  Patient continues to use emollient cream frequently (currently 5 times daily) and topical triamcinolone 0.1% twice daily intermittently (2 weeks on followed by 2 weeks off as instructed by dermatology).  The patient has minimal erythema on the palms, degree of scaling and peeling is minimal currently.  Sclerodactyly is stable.  Patient reports that symptoms are tolerable at this time.  As before, if symptoms worsen we will consider taking an additional week off of Xeloda in the future.    *Evidence of steatosis on scans with mild intermittent elevated liver function  studies:  Liver function studies increased 8/20/19 with ALT 98, AST 70, normal total bilirubin.  Negative viral hepatitis A, B, and C panel 8/23/18  Likely related to hepatic steatosis.   Subsequent improvement in LFTs  LFTs today are normal    *Chemotherapy induced leukopenia:  WBC today 3.75 with normal differential    *GERD, question of celiac disease:  Patient with significant history of reflux  Patient currently receiving Protonix 40 mg daily daily and Carafate 1 g 4 times daily as needed  Patient required hospitalization 5/29 - 6/1/2022 with presumed viral gastroenteritis that was exacerbated by Xeloda.    EGD performed 5/31/2022 during hospitalization with biopsy that showed focal blunting of villi and atrophy with slight increase in intraepithelial lymphocytes.  Changes were minimal but recommended correlation with serology to exclude celiac disease.  Celiac serology 8/8/2022 negative  Patient previously developed worsening reflux symptoms, temporarily increase Protonix to 40 mg twice daily and we did add Carafate 1 g 4 times daily.    She is taking Protonix 40 mg once daily, is not taking Carafate.    *Insomnia:  Patient with prior paradoxical reaction to Benadryl  Improved previously on temazepam 15 mg nightly as needed.   Patient noted subsequently that temazepam was having no effect.   Patient increased gabapentin to 900 mg nightly, discontinued temazepam    *Health maintenance:  Patient notes that she has a history of colon polyps as well as ulcerative colitis and was due for a follow-up colonoscopy on 9/12/2019.  We did discuss there is no necessity to pursue colonoscopy in the setting of her metastatic breast cancer.    The patient did undergo colonoscopy on 2/7/2020 with findings of muscular hypertrophy and diverticulosis.   Patient did wish to proceed with further colonoscopic evaluation, performed on 12/20/2022 with poor prep.  Repeat 2/28/2023 with diverticulosis.    *Bilateral shoulder  pain:  Chronic difficulty with right shoulder although she has not complained of this in prior visits to our office.  She reported difficulty with abduction.    The patient did undergo MRI of her right shoulder on 11/21/2019 at an outside facility showing multiple abnormalities including supraspinatus tendinosis, labral tear but no evidence of metastatic disease.  Patient developed pain in the left shoulder, was seen by orthopedics and underwent steroid injection with some improvement.  Right shoulder stable.  Patient did undergo physical therapy with some improvement.  She continues to use tramadol 50 mg every 8-12 hours as needed.  Note that current CT 10/20/2022 made notation of left shoulder probable bursitis.    Patient continues with bilateral shoulder pain, left greater than right which does wax and wane.    *Ocular changes in part related to Xeloda:  Patient experienced a mild degree of blurred vision as well as burning and pruritus.  She was seen by her ophthalmologist and was placed on xiidra ophthalmic drops which have helped.  Likely both issues are to some extent related to Xeloda.  Symptoms did worsen and patient was seen by ophthalmologist at [facility].  She is now using an ocular lubricant at night in addition to artificial tears which have helped.  Symptoms currently stable to improved    *Elevated glucose:  It is noted the patient's glucose at the last few visits has been in the high 100 range, postprandial.  She has had a hemoglobin A1c that has been in the high 5-6 range in the past, on 5/1/2019 at 5.7  Hemoglobin A1c was last checked in 11/12/2021 at 5.8    *Possible loosening of pedicle screw at T3:  CT cervical spine 5/17/2021 showed loosening of the left pedicle screw at T3.  Patient referred to orthopedics and was seen by Dr. Nayak who felt that there were no concerning findings on the scan    *Iron deficiency anemia  Labs on 5/2/2022 with hemoglobin 10.8.  Additional labs  with iron 48, ferritin 21.2, iron saturation 11%, TIBC 4 and 32, folate greater than 20, B12 greater than 2000.    Attempted treatment with oral iron daily however patient was intolerant  Patient subsequently received Venofer 300 mg weekly x4 beginning 6/6/2022 and completed on 6/27/2022  Subsequent iron studies on 7/11/2022 showed improvement with iron 62, ferritin 345, iron saturation 18%, TIBC 336.  Hemoglobin had improved up to 12.7 at that time.  Repeat iron studies on 10/3/2022 showed iron replete status with hemoglobin 13.7, iron 121, ferritin 208.7, iron saturation 31%, TIBC 392.  Hemoglobin currently normal at 12.6     Plan:  Continue palliative single agent Xeloda 1000 mg a.m., 1500 mg in the p.m. 7 days on followed by 7 days off   Proceed with monthly Xgeva today.  We will plan to change at this point to 3-month interval dosing of Xgeva  Continue Eliquis 5 mg twice daily  The patient will continue frequent use of emollient cream currently 5 times daily and continue periodic triamcinolone cream 0.1% twice daily (provided by dermatology) 2 weeks on followed by 2 weeks off as prescribed  Continue Protonix 40 mg daily  Continue ocular lubricating gel nightly per ophthalmology  Continue Provigil 100 mg daily and Cymbalta 30 mg twice daily.  Patient continues routine follow-up with supportive oncology   Patient will continue gabapentin 900 mg at night.   Patient continues on tramadol 50 mg every 8 hours as needed for pain  Continue Percocet 5/325 every 4 hours as needed for pain  The patient continues follow-up with her podiatrist and brace specialist regarding callus formation and scaling in the lateral left plantar surface  Patient continues follow-up with supportive oncology  In 6 weeks NP visit with CBC, CMP  MD visit in 3 months with CBC, CMP, magnesium, phosphorus.  Patient will be due for Xgeva.  1 week prior to visit CT chest abdomen pelvis and bone scan.     Patient continuing on high risk medication  requiring intensive monitoring.       I did spend 45 minutes in total time caring for the patient today, time spent in review of records, face-to-face consultation, coordination of care, placement of orders, completion of documentation.

## 2023-12-13 ENCOUNTER — SPECIALTY PHARMACY (OUTPATIENT)
Dept: ONCOLOGY | Facility: HOSPITAL | Age: 63
End: 2023-12-13
Payer: MEDICARE

## 2023-12-13 ENCOUNTER — OFFICE VISIT (OUTPATIENT)
Dept: ONCOLOGY | Facility: CLINIC | Age: 63
End: 2023-12-13
Payer: MEDICARE

## 2023-12-13 ENCOUNTER — LAB (OUTPATIENT)
Dept: LAB | Facility: HOSPITAL | Age: 63
End: 2023-12-13
Payer: MEDICARE

## 2023-12-13 ENCOUNTER — INFUSION (OUTPATIENT)
Dept: ONCOLOGY | Facility: HOSPITAL | Age: 63
End: 2023-12-13
Payer: MEDICARE

## 2023-12-13 ENCOUNTER — APPOINTMENT (OUTPATIENT)
Dept: ONCOLOGY | Facility: HOSPITAL | Age: 63
End: 2023-12-13
Payer: MEDICARE

## 2023-12-13 VITALS
RESPIRATION RATE: 18 BRPM | WEIGHT: 180.9 LBS | HEIGHT: 61 IN | TEMPERATURE: 98 F | BODY MASS INDEX: 34.15 KG/M2 | OXYGEN SATURATION: 96 % | DIASTOLIC BLOOD PRESSURE: 83 MMHG | HEART RATE: 89 BPM | SYSTOLIC BLOOD PRESSURE: 137 MMHG

## 2023-12-13 VITALS — RESPIRATION RATE: 16 BRPM | BODY MASS INDEX: 34.41 KG/M2 | HEIGHT: 61 IN

## 2023-12-13 DIAGNOSIS — Z17.1 MALIGNANT NEOPLASM OF OVERLAPPING SITES OF RIGHT BREAST IN FEMALE, ESTROGEN RECEPTOR NEGATIVE: ICD-10-CM

## 2023-12-13 DIAGNOSIS — Z17.1 MALIGNANT NEOPLASM OF OVERLAPPING SITES OF RIGHT BREAST IN FEMALE, ESTROGEN RECEPTOR NEGATIVE: Primary | ICD-10-CM

## 2023-12-13 DIAGNOSIS — C50.811 MALIGNANT NEOPLASM OF OVERLAPPING SITES OF RIGHT BREAST IN FEMALE, ESTROGEN RECEPTOR NEGATIVE: Primary | ICD-10-CM

## 2023-12-13 DIAGNOSIS — C50.919 PRIMARY MALIGNANT NEOPLASM OF BREAST WITH METASTASIS: ICD-10-CM

## 2023-12-13 DIAGNOSIS — C50.811 MALIGNANT NEOPLASM OF OVERLAPPING SITES OF RIGHT BREAST IN FEMALE, ESTROGEN RECEPTOR NEGATIVE: ICD-10-CM

## 2023-12-13 LAB
ALBUMIN SERPL-MCNC: 3.8 G/DL (ref 3.5–5.2)
ALBUMIN/GLOB SERPL: 1.4 G/DL
ALP SERPL-CCNC: 63 U/L (ref 39–117)
ALT SERPL W P-5'-P-CCNC: 11 U/L (ref 1–33)
ANION GAP SERPL CALCULATED.3IONS-SCNC: 9.8 MMOL/L (ref 5–15)
AST SERPL-CCNC: 20 U/L (ref 1–32)
BASOPHILS # BLD AUTO: 0.05 10*3/MM3 (ref 0–0.2)
BASOPHILS NFR BLD AUTO: 1.3 % (ref 0–1.5)
BILIRUB SERPL-MCNC: 0.4 MG/DL (ref 0–1.2)
BUN SERPL-MCNC: 15 MG/DL (ref 8–23)
BUN/CREAT SERPL: 17.6 (ref 7–25)
CALCIUM SPEC-SCNC: 9.2 MG/DL (ref 8.6–10.5)
CHLORIDE SERPL-SCNC: 100 MMOL/L (ref 98–107)
CO2 SERPL-SCNC: 32.2 MMOL/L (ref 22–29)
CREAT SERPL-MCNC: 0.85 MG/DL (ref 0.57–1)
DEPRECATED RDW RBC AUTO: 56.1 FL (ref 37–54)
EGFRCR SERPLBLD CKD-EPI 2021: 77.1 ML/MIN/1.73
EOSINOPHIL # BLD AUTO: 0.19 10*3/MM3 (ref 0–0.4)
EOSINOPHIL NFR BLD AUTO: 5.1 % (ref 0.3–6.2)
ERYTHROCYTE [DISTWIDTH] IN BLOOD BY AUTOMATED COUNT: 14.9 % (ref 12.3–15.4)
GLOBULIN UR ELPH-MCNC: 2.8 GM/DL
GLUCOSE SERPL-MCNC: 146 MG/DL (ref 65–99)
HCT VFR BLD AUTO: 37.6 % (ref 34–46.6)
HGB BLD-MCNC: 12.6 G/DL (ref 12–15.9)
IMM GRANULOCYTES # BLD AUTO: 0.01 10*3/MM3 (ref 0–0.05)
IMM GRANULOCYTES NFR BLD AUTO: 0.3 % (ref 0–0.5)
LYMPHOCYTES # BLD AUTO: 1.06 10*3/MM3 (ref 0.7–3.1)
LYMPHOCYTES NFR BLD AUTO: 28.3 % (ref 19.6–45.3)
MAGNESIUM SERPL-MCNC: 2 MG/DL (ref 1.6–2.4)
MCH RBC QN AUTO: 34.3 PG (ref 26.6–33)
MCHC RBC AUTO-ENTMCNC: 33.5 G/DL (ref 31.5–35.7)
MCV RBC AUTO: 102.5 FL (ref 79–97)
MONOCYTES # BLD AUTO: 0.3 10*3/MM3 (ref 0.1–0.9)
MONOCYTES NFR BLD AUTO: 8 % (ref 5–12)
NEUTROPHILS NFR BLD AUTO: 2.14 10*3/MM3 (ref 1.7–7)
NEUTROPHILS NFR BLD AUTO: 57 % (ref 42.7–76)
NRBC BLD AUTO-RTO: 0 /100 WBC (ref 0–0.2)
PHOSPHATE SERPL-MCNC: 3.8 MG/DL (ref 2.5–4.5)
PLATELET # BLD AUTO: 271 10*3/MM3 (ref 140–450)
PMV BLD AUTO: 10.1 FL (ref 6–12)
POTASSIUM SERPL-SCNC: 4.5 MMOL/L (ref 3.5–5.2)
PROT SERPL-MCNC: 6.6 G/DL (ref 6–8.5)
RBC # BLD AUTO: 3.67 10*6/MM3 (ref 3.77–5.28)
SODIUM SERPL-SCNC: 142 MMOL/L (ref 136–145)
WBC NRBC COR # BLD AUTO: 3.75 10*3/MM3 (ref 3.4–10.8)

## 2023-12-13 PROCEDURE — 99214 OFFICE O/P EST MOD 30 MIN: CPT | Performed by: INTERNAL MEDICINE

## 2023-12-13 PROCEDURE — 3075F SYST BP GE 130 - 139MM HG: CPT | Performed by: INTERNAL MEDICINE

## 2023-12-13 PROCEDURE — 1125F AMNT PAIN NOTED PAIN PRSNT: CPT | Performed by: INTERNAL MEDICINE

## 2023-12-13 PROCEDURE — 3079F DIAST BP 80-89 MM HG: CPT | Performed by: INTERNAL MEDICINE

## 2023-12-13 PROCEDURE — 85025 COMPLETE CBC W/AUTO DIFF WBC: CPT

## 2023-12-13 PROCEDURE — 80053 COMPREHEN METABOLIC PANEL: CPT

## 2023-12-13 PROCEDURE — 1159F MED LIST DOCD IN RCRD: CPT | Performed by: INTERNAL MEDICINE

## 2023-12-13 PROCEDURE — 96372 THER/PROPH/DIAG INJ SC/IM: CPT

## 2023-12-13 PROCEDURE — 83735 ASSAY OF MAGNESIUM: CPT

## 2023-12-13 PROCEDURE — 25010000002 DENOSUMAB 120 MG/1.7ML SOLUTION: Performed by: INTERNAL MEDICINE

## 2023-12-13 PROCEDURE — 1160F RVW MEDS BY RX/DR IN RCRD: CPT | Performed by: INTERNAL MEDICINE

## 2023-12-13 PROCEDURE — 84100 ASSAY OF PHOSPHORUS: CPT

## 2023-12-13 PROCEDURE — 36415 COLL VENOUS BLD VENIPUNCTURE: CPT

## 2023-12-13 RX ADMIN — DENOSUMAB 120 MG: 120 INJECTION SUBCUTANEOUS at 11:29

## 2023-12-13 NOTE — PROGRESS NOTES
Specialty Pharmacy Patient Management Program  Oncology 6-Month Clinical Assessment       Martha Davey is a 63 y.o. female with breast cancer seen today to assess adherence and side effects.    Regimen: Xeloda 1000 mg in the morning and 1500 mg in the evening for 7 days on, then 7 days off    Reason for Outreach: Routine medication check-in .       Problem list reviewed by Tiara Gonsales RPH on 12/13/2023 at 10:34 AM  Medicines reviewed by Tiara Gonsales RPH on 12/13/2023 at 10:33 AM  Allergies reviewed by Tiara Gonsales RPH on 12/13/2023 at 10:33 AM     Goals Addressed Today        Specialty Pharmacy General Goal      Progression free survival             Medication Assessment:  Medication Assessment  Follow Up Clinical Assessment  Linked Medication(s) Assessed: Capecitabine  Therapeutic appropriateness: Appropriate  Medication tolerability: Tolerating with no to minimal ADRs  Medication plan: Continue therapy with normal follow-up  Quality of Life Improvement Scale: 5-No change  Administration: Patient is taking every day at the same time  and as prescribed .   Patient can self administer medications: yes  Medication Follow-Up Plan: Next clinical assessment  Lab Review: The labs listed below have been reviewed. No dose adjustments are needed for the oral specialty medication(s) based on the labs. CMP still pending today -- will review with lab review task tomorrow.    Lab Results   Component Value Date    GLUCOSE 100 (H) 11/15/2023    CALCIUM 9.7 11/15/2023     11/15/2023    K 5.2 11/15/2023    CO2 33.1 (H) 11/15/2023    CL 99 11/15/2023    BUN 20 11/15/2023    CREATININE 1.10 12/08/2023    EGFRIFAFRI 85 11/12/2021    EGFRIFNONA 56 (L) 02/21/2022    BCR 22.2 11/15/2023    ANIONGAP 8.9 11/15/2023     Lab Results   Component Value Date    WBC 3.75 12/13/2023    RBC 3.67 (L) 12/13/2023    HGB 12.6 12/13/2023    HCT 37.6 12/13/2023    .5 (H) 12/13/2023    MCH 34.3 (H) 12/13/2023    MCHC  33.5 12/13/2023    RDW 14.9 12/13/2023    RDWSD 56.1 (H) 12/13/2023    MPV 10.1 12/13/2023     12/13/2023    NEUTRORELPCT 57.0 12/13/2023    LYMPHORELPCT 28.3 12/13/2023    MONORELPCT 8.0 12/13/2023    EOSRELPCT 5.1 12/13/2023    BASORELPCT 1.3 12/13/2023    AUTOIGPER 0.3 12/13/2023    NEUTROABS 2.14 12/13/2023    LYMPHSABS 1.06 12/13/2023    MONOSABS 0.30 12/13/2023    EOSABS 0.19 12/13/2023    BASOSABS 0.05 12/13/2023    AUTOIGNUM 0.01 12/13/2023    NRBC 0.0 12/13/2023     Drug-drug interactions  Completed medication reconciliation today to assess for drug interactions. Patient denies starting or stopping any medications.    Assessed medication list for interactions, no significant drug interactions noted.   Advised patient to call the clinic if any new medications are started so we can assess for drug-drug interactions.  Drug-food interactions discussed:  none today  Vaccines are coordinated by the patient's oncologist and primary care provider.    Allergies  Known allergies and reactions were discussed with the patient. The patient's chart has been reviewed for allergy information and updated as necessary.   Allergies   Allergen Reactions    Hydrocodone Nausea Only    Morphine And Related Nausea And Vomiting        Hospitalizations and Urgent Care Visits Since Last Assessment:  Unplanned hospitalizations or inpatient admissions: no  ED Visits: no  Urgent Office Visits: yes -- MD aware    Adherence Assessment:  Adherence Questions  Linked Medication(s) Assessed: Capecitabine  On average, how many doses/injections does the patient miss per month?: 0  What are the identified reasons for non-adherence or missed doses? : no problems identified  What is the estimated medication adherence level?: %  Based on the patient/caregiver response and refill history, does this patient require an MTP to track adherence improvements?: no    Quality of Life Assessment:  Quality of Life Assessment  Quality of Life  Improvement Scale: 5-No change  -- Quality of Life: 5/10    Financial Assessment:  Medication availability/affordability: Patient has had no issues obtaining medication from pharmacy.    Attestation     I attest the patient was actively involved in and has agreed to the above plan of care.  If the prescribed therapy is at any point deemed not appropriate based on the current or future assessments, a consultation will be initiated with the patient's specialty care provider to determine the best course of action. The revised plan of therapy will be documented along with any required assessments and/or additional patient education provided.       All questions addressed and patient had no additional concerns. Patient has pharmacy contact information.    Name/Credentials: Gage Gonsales PharmD, [facility]  Clinical Oncology Pharmacist    12/13/2023  10:34 EST

## 2023-12-14 DIAGNOSIS — G89.3 CANCER ASSOCIATED PAIN: ICD-10-CM

## 2023-12-14 LAB
ALBUMIN SERPL-MCNC: 4.3 G/DL (ref 3.5–5.2)
ALBUMIN/GLOB SERPL: 1.9 G/DL
ALP SERPL-CCNC: 65 U/L (ref 39–117)
ALT SERPL-CCNC: 12 U/L (ref 1–33)
AST SERPL-CCNC: 20 U/L (ref 1–32)
BASOPHILS # BLD AUTO: 0.05 10*3/MM3 (ref 0–0.2)
BASOPHILS NFR BLD AUTO: 1.1 % (ref 0–1.5)
BILIRUB SERPL-MCNC: 0.4 MG/DL (ref 0–1.2)
BUN SERPL-MCNC: 13 MG/DL (ref 8–23)
BUN/CREAT SERPL: 15.3 (ref 7–25)
CALCIUM SERPL-MCNC: 9.6 MG/DL (ref 8.6–10.5)
CHLORIDE SERPL-SCNC: 102 MMOL/L (ref 98–107)
CHOLEST SERPL-MCNC: 161 MG/DL (ref 0–200)
CO2 SERPL-SCNC: 30.3 MMOL/L (ref 22–29)
CREAT SERPL-MCNC: 0.85 MG/DL (ref 0.57–1)
EGFRCR SERPLBLD CKD-EPI 2021: 77.1 ML/MIN/1.73
EOSINOPHIL # BLD AUTO: 0.16 10*3/MM3 (ref 0–0.4)
EOSINOPHIL NFR BLD AUTO: 3.6 % (ref 0.3–6.2)
ERYTHROCYTE [DISTWIDTH] IN BLOOD BY AUTOMATED COUNT: 14.8 % (ref 12.3–15.4)
GLOBULIN SER CALC-MCNC: 2.3 GM/DL
GLUCOSE SERPL-MCNC: 117 MG/DL (ref 65–99)
HBA1C MFR BLD: 5.7 % (ref 4.8–5.6)
HCT VFR BLD AUTO: 37.5 % (ref 34–46.6)
HDLC SERPL-MCNC: 60 MG/DL (ref 40–60)
HGB BLD-MCNC: 12.9 G/DL (ref 12–15.9)
IMM GRANULOCYTES # BLD AUTO: 0 10*3/MM3 (ref 0–0.05)
IMM GRANULOCYTES NFR BLD AUTO: 0 % (ref 0–0.5)
LDLC SERPL CALC-MCNC: 78 MG/DL (ref 0–100)
LYMPHOCYTES # BLD AUTO: 1.11 10*3/MM3 (ref 0.7–3.1)
LYMPHOCYTES NFR BLD AUTO: 24.9 % (ref 19.6–45.3)
MCH RBC QN AUTO: 33.9 PG (ref 26.6–33)
MCHC RBC AUTO-ENTMCNC: 34.4 G/DL (ref 31.5–35.7)
MCV RBC AUTO: 98.4 FL (ref 79–97)
MONOCYTES # BLD AUTO: 0.34 10*3/MM3 (ref 0.1–0.9)
MONOCYTES NFR BLD AUTO: 7.6 % (ref 5–12)
NEUTROPHILS # BLD AUTO: 2.79 10*3/MM3 (ref 1.7–7)
NEUTROPHILS NFR BLD AUTO: 62.8 % (ref 42.7–76)
NRBC BLD AUTO-RTO: 0 /100 WBC (ref 0–0.2)
PLATELET # BLD AUTO: 290 10*3/MM3 (ref 140–450)
POTASSIUM SERPL-SCNC: 4.6 MMOL/L (ref 3.5–5.2)
PROT SERPL-MCNC: 6.6 G/DL (ref 6–8.5)
RBC # BLD AUTO: 3.81 10*6/MM3 (ref 3.77–5.28)
SODIUM SERPL-SCNC: 142 MMOL/L (ref 136–145)
TRIGL SERPL-MCNC: 133 MG/DL (ref 0–150)
VLDLC SERPL CALC-MCNC: 23 MG/DL (ref 5–40)
WBC # BLD AUTO: 4.45 10*3/MM3 (ref 3.4–10.8)

## 2023-12-14 RX ORDER — TRAMADOL HYDROCHLORIDE 50 MG/1
50 TABLET ORAL EVERY 8 HOURS PRN
Qty: 90 TABLET | Refills: 0 | Status: SHIPPED | OUTPATIENT
Start: 2023-12-14

## 2023-12-19 ENCOUNTER — OFFICE VISIT (OUTPATIENT)
Dept: INTERNAL MEDICINE | Facility: CLINIC | Age: 63
End: 2023-12-19
Payer: MEDICARE

## 2023-12-19 ENCOUNTER — TELEPHONE (OUTPATIENT)
Dept: INTERNAL MEDICINE | Facility: CLINIC | Age: 63
End: 2023-12-19
Payer: MEDICARE

## 2023-12-19 VITALS
SYSTOLIC BLOOD PRESSURE: 126 MMHG | OXYGEN SATURATION: 97 % | WEIGHT: 181 LBS | HEART RATE: 90 BPM | HEIGHT: 61 IN | BODY MASS INDEX: 34.17 KG/M2 | DIASTOLIC BLOOD PRESSURE: 72 MMHG

## 2023-12-19 DIAGNOSIS — E78.5 HYPERLIPIDEMIA, UNSPECIFIED HYPERLIPIDEMIA TYPE: ICD-10-CM

## 2023-12-19 DIAGNOSIS — R10.31 RIGHT INGUINAL PAIN: ICD-10-CM

## 2023-12-19 DIAGNOSIS — N30.01 ACUTE CYSTITIS WITH HEMATURIA: ICD-10-CM

## 2023-12-19 DIAGNOSIS — I10 HYPERTENSION, UNSPECIFIED TYPE: ICD-10-CM

## 2023-12-19 DIAGNOSIS — M25.551 RIGHT HIP PAIN: ICD-10-CM

## 2023-12-19 DIAGNOSIS — R45.89 DEPRESSED MOOD: ICD-10-CM

## 2023-12-19 DIAGNOSIS — N32.81 OVERACTIVE BLADDER: Primary | ICD-10-CM

## 2023-12-19 LAB
BILIRUB BLD-MCNC: NEGATIVE MG/DL
CLARITY, POC: ABNORMAL
COLOR UR: ABNORMAL
EXPIRATION DATE: ABNORMAL
GLUCOSE UR STRIP-MCNC: NEGATIVE MG/DL
KETONES UR QL: NEGATIVE
LEUKOCYTE EST, POC: ABNORMAL
Lab: ABNORMAL
NITRITE UR-MCNC: POSITIVE MG/ML
PH UR: 6 [PH] (ref 5–8)
PROT UR STRIP-MCNC: NEGATIVE MG/DL
RBC # UR STRIP: ABNORMAL /UL
SP GR UR: 1.02 (ref 1–1.03)
UROBILINOGEN UR QL: ABNORMAL

## 2023-12-19 PROCEDURE — 99214 OFFICE O/P EST MOD 30 MIN: CPT | Performed by: INTERNAL MEDICINE

## 2023-12-19 PROCEDURE — 3074F SYST BP LT 130 MM HG: CPT | Performed by: INTERNAL MEDICINE

## 2023-12-19 PROCEDURE — 81003 URINALYSIS AUTO W/O SCOPE: CPT | Performed by: INTERNAL MEDICINE

## 2023-12-19 PROCEDURE — 3078F DIAST BP <80 MM HG: CPT | Performed by: INTERNAL MEDICINE

## 2023-12-19 PROCEDURE — 1159F MED LIST DOCD IN RCRD: CPT | Performed by: INTERNAL MEDICINE

## 2023-12-19 PROCEDURE — 1160F RVW MEDS BY RX/DR IN RCRD: CPT | Performed by: INTERNAL MEDICINE

## 2023-12-19 RX ORDER — CEPHALEXIN 500 MG/1
500 CAPSULE ORAL 2 TIMES DAILY
Qty: 14 CAPSULE | Refills: 0 | Status: SHIPPED | OUTPATIENT
Start: 2023-12-19

## 2023-12-19 RX ORDER — VIBEGRON 75 MG/1
75 TABLET, FILM COATED ORAL DAILY
Qty: 30 TABLET | Refills: 5 | Status: SHIPPED | OUTPATIENT
Start: 2023-12-19

## 2023-12-19 NOTE — PROGRESS NOTES
Chief Complaint   Patient presents with    Hypertension    Hyperlipidemia    Depression    Breast Cancer       Hypertension    Hyperlipidemia    Depression     Martha Davey is a 63 y.o. female presents for follow up evaluation. She is having struggles with spouse ill w cancer. She notes that she is going to psychotherapy both personal and w/ spouse through cancer center.  She has mets breast cancer. Some right groin/ hip pain morteza is new and intermittent in natue.   Patient has oab. Started gemtesa samples w/ benefit.   She has medically managed hypertension and hyperlipidemia.   Patient has weight concerns. She is asking about meds for weight.     The following portions of the patient's history were reviewed and updated as appropriate: allergies, current medications, past family history, past medical history, past social history, past surgical history and problem list.  Current Outpatient Medications on File Prior to Visit   Medication Sig Dispense Refill    ALPRAZolam (Xanax) 0.25 MG tablet Take 1 tablet by mouth 2 (Two) Times a Day As Needed for Anxiety. 60 tablet 0    azelastine (ASTELIN) 0.1 % nasal spray 2 sprays into the nostril(s) as directed by provider 2 (Two) Times a Day. Use in each nostril as directed 30 mL 12    calcium carbonate (OS-CARY) 600 MG tablet Take 1 tablet by mouth 2 (Two) Times a Day With Meals.      capecitabine (Xeloda) 500 MG chemo tablet Take 2 tablets (1000 mg) by mouth in the morning, and 3 tablets (1500 mg) in the evening. Take for 7 days on, followed by 7 days off. 70 tablet 5    cholecalciferol (VITAMIN D3) 1000 units tablet Take 1 tablet by mouth Daily.      CRANBERRY PO Take  by mouth.      Cyanocobalamin ER 1000 MCG tablet controlled-release Take 1 tablet by mouth Daily.      Diclofenac Sodium (VOLTAREN) 1 % gel gel Place 4 g on the skin as directed by provider.      DULoxetine (Cymbalta) 60 MG capsule Take 1 capsule by mouth Daily. 90 capsule 3    Eliquis 5 MG tablet tablet  TAKE 1 TABLET BY MOUTH  EVERY 12 HOURS 180 tablet 3    FLONASE ALLERGY RELIEF 50 MCG/ACT nasal spray 2 sprays into the nostril(s) as directed by provider Daily.  11    gabapentin (NEURONTIN) 300 MG capsule TAKE 1 CAPSULE BY MOUTH 3 TIMES  DAILY 270 capsule 3    Lialda 1.2 g EC tablet TAKE 1 TABLET BY MOUTH TWICE  DAILY 180 tablet 3    losartan (Cozaar) 25 MG tablet Take 1 tablet by mouth Daily. 90 tablet 3    metaxalone (Skelaxin) 800 MG tablet Take 1 tablet by mouth 3 (Three) Times a Day As Needed for Muscle Spasms. 30 tablet 0    methenamine (HIPREX) 1 g tablet Take 1 tablet by mouth Daily.      modafinil (PROVIGIL) 200 MG tablet Take 1 tablet by mouth Daily. 30 tablet 2    Multiple Vitamins-Minerals (Multivitamin Gummies Womens) chewable tablet Chew 1 tablet Daily.      mupirocin (BACTROBAN) 2 % ointment Apply 1 application  topically to the appropriate area as directed As Needed.      ondansetron ODT (ZOFRAN-ODT) 8 MG disintegrating tablet Place 1 tablet on the tongue Every 8 (Eight) Hours As Needed for Nausea or Vomiting. 30 tablet 1    oxyCODONE-acetaminophen (PERCOCET) 5-325 MG per tablet Take 1 tablet by mouth Every 4 (Four) Hours As Needed for Moderate Pain. 60 tablet 0    pantoprazole (PROTONIX) 40 MG EC tablet TAKE 1 TABLET BY MOUTH  DAILY 90 tablet 3    Probiotic Product (PROBIOTIC-10 PO) Take  by mouth.      rosuvastatin (CRESTOR) 5 MG tablet Take 1 tablet by mouth Daily. 90 tablet 3    saccharomyces boulardii (Florastor) 250 MG capsule Take 1 capsule by mouth 2 (Two) Times a Day. 20 capsule 0    traMADol (ULTRAM) 50 MG tablet Take 1 tablet by mouth Every 8 (Eight) Hours As Needed for Moderate Pain. 90 tablet 0    triamcinolone (KENALOG) 0.1 % cream Apply  topically to the appropriate area as directed 2 (Two) Times a Day. 15 g 0    urea (CARMOL) 10 % cream Apply  topically to the appropriate area as directed 3 (Three) Times a Day. 453 g 1    White Petrolatum-Mineral Oil (Wh Petrol-Mineral Oil-Lanolin)  0.1-0.1 % ointment Apply  to eye(s) as directed by provider.      amoxicillin-clavulanate (AUGMENTIN) 875-125 MG per tablet Take 1 tablet by mouth 2 (Two) Times a Day. (Patient not taking: Reported on 12/19/2023) 28 tablet 0    Hylan (SYNVISC) 16 MG/2ML solution prefilled syringe injection Inject 2 mL into the appropriate joint as directed by provider As Needed. (Patient not taking: Reported on 12/19/2023)      nitrofurantoin, macrocrystal-monohydrate, (Macrobid) 100 MG capsule Take 1 capsule by mouth 2 (Two) Times a Day. (Patient not taking: Reported on 12/19/2023) 14 capsule 0    phenazopyridine (PYRIDIUM) 200 MG tablet Take 1 tablet by mouth 3 (Three) Times a Day As Needed for Bladder Spasms. (Patient not taking: Reported on 12/19/2023) 6 tablet 0     No current facility-administered medications on file prior to visit.     Review of Systems   Constitutional: Negative.    HENT: Negative.     Eyes: Negative.    Respiratory: Negative.     Cardiovascular: Negative.    Gastrointestinal: Negative.    Endocrine: Negative.    Genitourinary: Negative.    Musculoskeletal: Negative.    Skin: Negative.    Allergic/Immunologic: Negative.    Neurological: Negative.    Hematological: Negative.    Psychiatric/Behavioral: Negative.         Objective   Physical Exam  Vitals and nursing note reviewed.   Constitutional:       Appearance: Normal appearance. She is well-developed.   HENT:      Head: Normocephalic and atraumatic.      Right Ear: Tympanic membrane and external ear normal.      Left Ear: Tympanic membrane and external ear normal.      Nose: Nose normal.      Mouth/Throat:      Mouth: Mucous membranes are moist.   Eyes:      Extraocular Movements: Extraocular movements intact.      Pupils: Pupils are equal, round, and reactive to light.   Cardiovascular:      Rate and Rhythm: Normal rate and regular rhythm.      Pulses: Normal pulses.      Heart sounds: Normal heart sounds.   Pulmonary:      Effort: Pulmonary effort is  "normal. No respiratory distress.      Breath sounds: Normal breath sounds.   Abdominal:      General: Abdomen is flat.      Palpations: Abdomen is soft.   Musculoskeletal:         General: Normal range of motion.      Cervical back: Normal range of motion and neck supple.   Skin:     General: Skin is warm and dry.   Neurological:      General: No focal deficit present.      Mental Status: She is alert and oriented to person, place, and time.   Psychiatric:         Mood and Affect: Mood normal.         Behavior: Behavior normal.         Thought Content: Thought content normal.         Judgment: Judgment normal.          /72   Pulse 90   Ht 154.9 cm (61\")   Wt 82.1 kg (181 lb)   LMP  (LMP Unknown)   SpO2 97%   BMI 34.20 kg/m²     Assessment & Plan   Diagnoses and all orders for this visit:    Overactive bladder  -     POC Urinalysis Dipstick, Automated    Hyperlipidemia, unspecified hyperlipidemia type    Hypertension, unspecified type    Depressed mood    Other orders  -     Vibegron (Gemtesa) 75 MG tablet; Take 1 tablet by mouth Daily.    Patient mood. Will try increasing cymbalta to 90 mg daily. Continue w/ mental health therapist.   She has OAB. Will test urine today. Will start gemtesa for OAB. She is to reduce caffeine. Hydrate w/ water.   She has right groin pain. Suspect tendonitis. Massage gun, voltaren gel, physical therpy. Will get hip imagning given h/o bone mets.  Htn- continue current. Monitor med at home  Hyperlip- lifestyle mod. Continue current med.   Weight concerns- encouraged heatlhy nutrtion and activity as tolerated. Would like to avoid additional meds/ interactions/ side effects at this time.            "

## 2023-12-19 NOTE — TELEPHONE ENCOUNTER
Pt informed    ----- Message from Jazmine Chanel MD sent at 12/19/2023 12:57 PM EST -----  Urine w/ possible infection. Please send for culture. Antibiotic sent to her pharmacy. May need daily antibiotic if continued uti. I would like to repeat u/a in 2 weeks.   kate

## 2023-12-20 ENCOUNTER — TELEPHONE (OUTPATIENT)
Dept: INTERNAL MEDICINE | Facility: CLINIC | Age: 63
End: 2023-12-20

## 2023-12-20 ENCOUNTER — TELEPHONE (OUTPATIENT)
Dept: ONCOLOGY | Facility: CLINIC | Age: 63
End: 2023-12-20

## 2023-12-20 NOTE — TELEPHONE ENCOUNTER
Caller: Martha Davey    Relationship: Self    Best call back number: [phone]      Who are you requesting to speak with (clinical staff, provider,  specific staff member):         What was the call regarding: PATIENT WAS REFERRED TO PHYSICAL THERAPY HER APPOINTMENT IS TOMORROW 12-21-23 AT 12:00 PM.    PATIENT IS REQUESTING THAT WE FAX THE REFERRAL OVER BECAUSE PHYSICAL THERAPY STATES THEY HAVEN'T RECEIVED IT YET.  ATTN: ADAN  FAX NUMBER: [phone]

## 2023-12-20 NOTE — TELEPHONE ENCOUNTER
Caller: Martha Davey    Relationship: Self    Best call back number: [phone]    Who are you requesting to speak with (clinical staff, provider,  specific staff member): NON-CLINICAL    What was the call regarding: PT WOULD LIKE TO HAVE AN APPT REMINDER MAILED OUT TO HER WITH ALL HER UPCOMING APPT DATE/TIMES.

## 2023-12-22 LAB
BACTERIA UR CULT: ABNORMAL
BACTERIA UR CULT: ABNORMAL
OTHER ANTIBIOTIC SUSC ISLT: ABNORMAL

## 2023-12-26 ENCOUNTER — SPECIALTY PHARMACY (OUTPATIENT)
Dept: PHARMACY | Facility: HOSPITAL | Age: 63
End: 2023-12-26
Payer: MEDICARE

## 2023-12-26 RX ORDER — CAPECITABINE 500 MG/1
TABLET, FILM COATED ORAL
Qty: 70 TABLET | Refills: 5 | Status: SHIPPED | OUTPATIENT
Start: 2023-12-26

## 2023-12-26 NOTE — PROGRESS NOTES
Drug: Xeloda (capecitabine)  Strength: 500 mg  Directions: Take 2 tablets by mouth  in the AM and 3 tablets by mouth in the PM for 7 days on, then 7 days off  Quantity: 70  Refills: 5  Pharmacy prescription sent to: [facility]  Completed independent double check on medication order/RX.  Padmini Christian, Rudolph, BCOP

## 2023-12-26 NOTE — TELEPHONE ENCOUNTER
Refill requested from pharmacy. Per last chart note, patient to continue Xeloda 1000 mg a.m., 1500 mg in the p.m. 7 days on followed by 7 days off. Will route to MD for cosignature.    Gage Gonsales, PharmD, [facility]  Clinical Oncology Pharmacist

## 2023-12-26 NOTE — PROGRESS NOTES
Re: Refills of Oral Specialty Medication - Xeloda (capecitabine)    Drug-Drug Interactions: The current medication list was reviewed and there are no relevant drug-drug interactions with the specialty medication.  Medication Allergies: The patient has no relevant allergies as it relates to their oral specialty medication  Review of Labs/Dose Adjustments: NO DOSE CHANGE - I reviewed the most recent note and labs and the patient will continue without any dose changes.  I sent refills as described below.    Drug: Xeloda (capecitabine)  Strength: 500 mg  Directions: Take 2 tablets by mouth  in the AM and 3 tablets by mouth in the PM for 7 days on, then 7 days off  Quantity: 70  Refills: 5  Pharmacy prescription sent to: [facility]    Name/Credentials: Gage Gonsales, PharmD, BCOP  Clinical Oncology Pharmacist    12/26/2023  10:10 EST

## 2023-12-29 ENCOUNTER — OFFICE VISIT (OUTPATIENT)
Dept: PSYCHIATRY | Facility: HOSPITAL | Age: 63
End: 2023-12-29
Payer: MEDICARE

## 2023-12-29 ENCOUNTER — HOSPITAL ENCOUNTER (OUTPATIENT)
Facility: HOSPITAL | Age: 63
Discharge: HOME | End: 2023-12-29
Admitting: INTERNAL MEDICINE
Payer: MEDICARE

## 2023-12-29 DIAGNOSIS — F41.1 GENERALIZED ANXIETY DISORDER: ICD-10-CM

## 2023-12-29 DIAGNOSIS — F33.1 MAJOR DEPRESSIVE DISORDER, RECURRENT EPISODE, MODERATE: Primary | ICD-10-CM

## 2023-12-29 DIAGNOSIS — C50.919 PRIMARY MALIGNANT NEOPLASM OF BREAST WITH METASTASIS: ICD-10-CM

## 2023-12-29 DIAGNOSIS — R53.0 NEOPLASTIC MALIGNANT RELATED FATIGUE: ICD-10-CM

## 2023-12-29 DIAGNOSIS — G47.33 OSA (OBSTRUCTIVE SLEEP APNEA): ICD-10-CM

## 2023-12-29 DIAGNOSIS — F43.10 POST TRAUMATIC STRESS DISORDER (PTSD): ICD-10-CM

## 2023-12-29 PROCEDURE — 73502 X-RAY EXAM HIP UNI 2-3 VIEWS: CPT

## 2023-12-29 NOTE — PROGRESS NOTES
[facility]  In Person Session    Subjective  Patient ID: Martha Davey is a 63 y.o. female is seen face to face in the [facility].    Pt is seen together with , both for individual needs, appreciating collaboration with their spouse. Permission granted by both.    CC: Anxiety, depression, interpersonal sensitivity    HPI/ Interval History:   Pt is seen in clinic regarding sx of depression and anxiety, interpersonal sensitivity, and continued familial distress. Was able to enjoy holidays with immediate family, enforcing boundaries as desired. Conflict with brother persists, new imformation shared regarding complicated dynamics. Identifies tremendous grief regarding this and 's illness. Pt has upcoming apt with therapist in mid January.     Mood sx remain stable, complicated. Anxiety persists, while feeling current medications are working well. Sleep remains somewhat labile. No longer taking temazepam, using gabapentin instead. Pt continues to have pain in hip, engaging in PT. Has visit scheduled for sleep medicine in January.     Exam: As above    Recent Labs Reviewed:  Office Visit on 12/19/2023   Component Date Value    Color 12/19/2023 Dark Yellow     Clarity, UA 12/19/2023 Slightly Cloudy (A)     Specific Gravity  12/19/2023 1.020     pH, Urine 12/19/2023 6.0     Leukocytes 12/19/2023 Large (3+) (A)     Nitrite, UA 12/19/2023 Positive (A)     Protein, POC 12/19/2023 Negative     Glucose, UA 12/19/2023 Negative     Ketones, UA 12/19/2023 Negative     Urobilinogen, UA 12/19/2023 0.2 E.U./dL     Bilirubin 12/19/2023 Negative     Blood, UA 12/19/2023 Large (A)     Lot Number 12/19/2023 [id number]     Expiration Date 12/19/2023 [id number]     Urine Culture 12/19/2023 Final report (A)     Result 1 12/19/2023 Escherichia coli (A)     Susceptibility Testing 12/19/2023 Comment    Lab on 12/13/2023   Component Date Value    Glucose 12/13/2023 146 (H)     BUN  12/13/2023 15     Creatinine 12/13/2023 0.85     Sodium 12/13/2023 142     Potassium 12/13/2023 4.5     Chloride 12/13/2023 100     CO2 12/13/2023 32.2 (H)     Calcium 12/13/2023 9.2     Total Protein 12/13/2023 6.6     Albumin 12/13/2023 3.8     ALT (SGPT) 12/13/2023 11     AST (SGOT) 12/13/2023 20     Alkaline Phosphatase 12/13/2023 63     Total Bilirubin 12/13/2023 0.4     Globulin 12/13/2023 2.8     A/G Ratio 12/13/2023 1.4     BUN/Creatinine Ratio 12/13/2023 17.6     Anion Gap 12/13/2023 9.8     eGFR 12/13/2023 77.1     Magnesium 12/13/2023 2.0     Phosphorus 12/13/2023 3.8     WBC 12/13/2023 3.75     RBC 12/13/2023 3.67 (L)     Hemoglobin 12/13/2023 12.6     Hematocrit 12/13/2023 37.6     MCV 12/13/2023 102.5 (H)     MCH 12/13/2023 34.3 (H)     MCHC 12/13/2023 33.5     RDW 12/13/2023 14.9     RDW-SD 12/13/2023 56.1 (H)     MPV 12/13/2023 10.1     Platelets 12/13/2023 271     Neutrophil % 12/13/2023 57.0     Lymphocyte % 12/13/2023 28.3     Monocyte % 12/13/2023 8.0     Eosinophil % 12/13/2023 5.1     Basophil % 12/13/2023 1.3     Immature Grans % 12/13/2023 0.3     Neutrophils, Absolute 12/13/2023 2.14     Lymphocytes, Absolute 12/13/2023 1.06     Monocytes, Absolute 12/13/2023 0.30     Eosinophils, Absolute 12/13/2023 0.19     Basophils, Absolute 12/13/2023 0.05     Immature Grans, Absolute 12/13/2023 0.01     nRBC 12/13/2023 0.0    Hospital Outpatient Visit on 12/08/2023   Component Date Value    Creatinine 12/08/2023 1.10    Orders Only on 12/07/2023   Component Date Value    WBC 12/14/2023 4.45     RBC 12/14/2023 3.81     Hemoglobin 12/14/2023 12.9     Hematocrit 12/14/2023 37.5     MCV 12/14/2023 98.4 (H)     MCH 12/14/2023 33.9 (H)     MCHC 12/14/2023 34.4     RDW 12/14/2023 14.8     Platelets 12/14/2023 290     Neutrophil Rel % 12/14/2023 62.8     Lymphocyte Rel % 12/14/2023 24.9     Monocyte Rel % 12/14/2023 7.6     Eosinophil Rel % 12/14/2023 3.6     Basophil Rel % 12/14/2023 1.1     Neutrophils  Absolute 12/14/2023 2.79     Lymphocytes Absolute 12/14/2023 1.11     Monocytes Absolute 12/14/2023 0.34     Eosinophils Absolute 12/14/2023 0.16     Basophils Absolute 12/14/2023 0.05     Immature Granulocyte Rel* 12/14/2023 0.0     Immature Grans Absolute 12/14/2023 0.00     nRBC 12/14/2023 0.0     Glucose 12/14/2023 117 (H)     BUN 12/14/2023 13     Creatinine 12/14/2023 0.85     EGFR Result 12/14/2023 77.1     BUN/Creatinine Ratio 12/14/2023 15.3     Sodium 12/14/2023 142     Potassium 12/14/2023 4.6     Chloride 12/14/2023 102     Total CO2 12/14/2023 30.3 (H)     Calcium 12/14/2023 9.6     Total Protein 12/14/2023 6.6     Albumin 12/14/2023 4.3     Globulin 12/14/2023 2.3     A/G Ratio 12/14/2023 1.9     Total Bilirubin 12/14/2023 0.4     Alkaline Phosphatase 12/14/2023 65     AST (SGOT) 12/14/2023 20     ALT (SGPT) 12/14/2023 12     Total Cholesterol 12/14/2023 161     Triglycerides 12/14/2023 133     HDL Cholesterol 12/14/2023 60     VLDL Cholesterol Feng 12/14/2023 23     LDL Chol Calc ([facility]) 12/14/2023 78     Hemoglobin A1C 12/14/2023 5.70 (H)    Lab on 11/15/2023   Component Date Value    Glucose 11/15/2023 100 (H)     BUN 11/15/2023 20     Creatinine 11/15/2023 0.90     Sodium 11/15/2023 141     Potassium 11/15/2023 5.2     Chloride 11/15/2023 99     CO2 11/15/2023 33.1 (H)     Calcium 11/15/2023 9.7     Total Protein 11/15/2023 6.8     Albumin 11/15/2023 3.9     ALT (SGPT) 11/15/2023 16     AST (SGOT) 11/15/2023 25     Alkaline Phosphatase 11/15/2023 55     Total Bilirubin 11/15/2023 0.4     Globulin 11/15/2023 2.9     A/G Ratio 11/15/2023 1.3     BUN/Creatinine Ratio 11/15/2023 22.2     Anion Gap 11/15/2023 8.9     eGFR 11/15/2023 72.4     Magnesium 11/15/2023 2.1     Phosphorus 11/15/2023 3.5     WBC 11/15/2023 5.79     RBC 11/15/2023 3.73 (L)     Hemoglobin 11/15/2023 12.6     Hematocrit 11/15/2023 38.1     MCV 11/15/2023 102.1 (H)     MCH 11/15/2023 33.8 (H)     MCHC 11/15/2023 33.1     RDW 11/15/2023  15.3     RDW-SD 11/15/2023 57.1 (H)     MPV 11/15/2023 9.9     Platelets 11/15/2023 244     Neutrophil % 11/15/2023 62.8     Lymphocyte % 11/15/2023 24.2     Monocyte % 11/15/2023 9.5     Eosinophil % 11/15/2023 2.6     Basophil % 11/15/2023 0.9     Immature Grans % 11/15/2023 0.0     Neutrophils, Absolute 11/15/2023 3.64     Lymphocytes, Absolute 11/15/2023 1.40     Monocytes, Absolute 11/15/2023 0.55     Eosinophils, Absolute 11/15/2023 0.15     Basophils, Absolute 11/15/2023 0.05     Immature Grans, Absolute 11/15/2023 0.00     nRBC 11/15/2023 0.0    Labs reviewed    Current Psychotropic Medications:  Duloxetine 30 mg bid  Gabapentin  Modafinil    Plan of Care/ Medical Decision Making  Continue mediations as written.  Supported patient in continued processing of complicated relationships, current distress.  Considered effective communication strategies with therapist.  Follow up scheduled in 4 weeks.    Diagnoses and all orders for this visit:    1. Major depressive disorder, recurrent episode, moderate (Primary)    2. Post traumatic stress disorder (PTSD)    3. Neoplastic malignant related fatigue    4. Metastatic breast cancer    5. Generalized anxiety disorder    6. MARIA M (obstructive sleep apnea)

## 2024-01-05 ENCOUNTER — TELEPHONE (OUTPATIENT)
Dept: INTERNAL MEDICINE | Facility: CLINIC | Age: 64
End: 2024-01-05
Payer: MEDICARE

## 2024-01-05 NOTE — TELEPHONE ENCOUNTER
Pt informed    ----- Message from Jazmine Chanel MD sent at 1/5/2024  8:09 AM EST -----  Patient's xray w no fracture. Stable metastisis. Is her pain improving or does she need additional med or treatment for hip pain?   JW

## 2024-01-11 DIAGNOSIS — R82.90 ABNORMAL URINE: Primary | ICD-10-CM

## 2024-01-16 ENCOUNTER — TELEPHONE (OUTPATIENT)
Dept: INTERNAL MEDICINE | Facility: CLINIC | Age: 64
End: 2024-01-16

## 2024-01-16 LAB
APPEARANCE UR: ABNORMAL
BACTERIA #/AREA URNS HPF: ABNORMAL /HPF
BACTERIA UR CULT: ABNORMAL
BACTERIA UR CULT: ABNORMAL
BILIRUB UR QL STRIP: NEGATIVE
CASTS URNS QL MICRO: ABNORMAL /LPF
COLOR UR: YELLOW
EPI CELLS #/AREA URNS HPF: ABNORMAL /HPF (ref 0–10)
GLUCOSE UR QL STRIP: NEGATIVE
HGB UR QL STRIP: ABNORMAL
KETONES UR QL STRIP: NEGATIVE
LEUKOCYTE ESTERASE UR QL STRIP: ABNORMAL
MICRO URNS: ABNORMAL
NITRITE UR QL STRIP: POSITIVE
OTHER ANTIBIOTIC SUSC ISLT: ABNORMAL
PH UR STRIP: 5.5 [PH] (ref 5–7.5)
PROT UR QL STRIP: ABNORMAL
RBC #/AREA URNS HPF: >30 /HPF (ref 0–2)
SP GR UR STRIP: 1.02 (ref 1–1.03)
URINALYSIS REFLEX: ABNORMAL
UROBILINOGEN UR STRIP-MCNC: 0.2 MG/DL (ref 0.2–1)
WBC #/AREA URNS HPF: >30 /HPF (ref 0–5)

## 2024-01-16 RX ORDER — NITROFURANTOIN 25; 75 MG/1; MG/1
100 CAPSULE ORAL 2 TIMES DAILY
Qty: 14 CAPSULE | Refills: 0 | Status: SHIPPED | OUTPATIENT
Start: 2024-01-16

## 2024-01-17 ENCOUNTER — TELEPHONE (OUTPATIENT)
Dept: INTERNAL MEDICINE | Facility: CLINIC | Age: 64
End: 2024-01-17
Payer: MEDICARE

## 2024-01-17 NOTE — TELEPHONE ENCOUNTER
----- Message from Jazmine Chanel MD sent at 1/16/2024  5:04 PM EST -----  Patient urine culture confirms urinary tract infection. She should take prescribed nitrofurantoin. Does not need visit this week but should follow up if symptoms persist  jw

## 2024-01-18 ENCOUNTER — OFFICE VISIT (OUTPATIENT)
Dept: SLEEP MEDICINE | Facility: HOSPITAL | Age: 64
End: 2024-01-18
Payer: MEDICARE

## 2024-01-18 VITALS
WEIGHT: 183.6 LBS | OXYGEN SATURATION: 96 % | DIASTOLIC BLOOD PRESSURE: 82 MMHG | HEIGHT: 61 IN | HEART RATE: 108 BPM | SYSTOLIC BLOOD PRESSURE: 157 MMHG | BODY MASS INDEX: 34.66 KG/M2

## 2024-01-18 DIAGNOSIS — G47.33 OSA ON CPAP: Primary | ICD-10-CM

## 2024-01-18 DIAGNOSIS — E66.9 CLASS 1 OBESITY: ICD-10-CM

## 2024-01-18 DIAGNOSIS — R53.0 NEOPLASTIC MALIGNANT RELATED FATIGUE: ICD-10-CM

## 2024-01-18 PROBLEM — E66.811 CLASS 1 OBESITY: Status: ACTIVE | Noted: 2024-01-18

## 2024-01-18 PROCEDURE — G0463 HOSPITAL OUTPT CLINIC VISIT: HCPCS

## 2024-01-18 NOTE — PROGRESS NOTES
"  [facility]  [address]  [address]  [place] [address]  Phone   Fax       SLEEP CLINIC FOLLOW UP PROGRESS NOTE.    Martha Davey  [id number]   1960  63 y.o.  female      PCP: Jazmine Chanel MD      Date of visit: 1/18/2024    Chief Complaint   Patient presents with    Sleep Apnea    Obesity       HPI:  This is a 63 y.o. years old patient is here for the management of obstructive sleep apnea. Patient is using positive airway pressure therapy with auto CPAP but unfortunately she is not using the CPAP.  Normally patient goes to bed at 12 midnight and wakes up at 9 AM AM .  The patient wakes up 2 time(s) during the night and has no problem going back to sleep.  She feels tired.  She is also on chemotherapy for breast cancer.    Medications and allergies are reviewed by me and documented in the encounter.     SOCIAL (habits pertaining to sleep medicine)  History tobacco use:No   History of alcohol use: 1 per week  Caffeine use: 2     REVIEW OF SYSTEMS:   Pertaining positive symptoms are:  Gobler Sleepiness Scale :Total score: 2   Nasal congestion      PHYSICAL EXAMINATION:  CONSTITUTIONAL:  Vitals:    01/18/24 1416   BP: 157/82   Pulse: 108   SpO2: 96%   Weight: 83.3 kg (183 lb 9.6 oz)   Height: 154.9 cm (61\")    Body mass index is 34.69 kg/m².   NOSE: nasal passages are clear, No deformities noted   RESP SYSTEM: Not in any respiratory distress, no chest deformities noted,   CARDIOVASULAR: No edema noted  NEURO: Oriented x 3, gait normal,  Mood and affect appeared appropriate      Data reviewed:  The Smart card downloaded on 1/18/2024 has been reviewed independently by me for compliance and discussed the data with the patient.   Poor compliance  Residual AHI: 2.9 /hr (goal < 5.0 /hr)  Device: ResMed  DME: [employer], previously she was getting supplies from our care but now she is moved to [place]  She had a mask fit in the clinic and suggested that she can " use ResMed N30 mask example is being given so that she can try and if she likes it then she can get N 30      ASSESSMENT AND PLAN:  Obstructive sleep apnea ( G 47.33).  Patient was given a mask in the clinic and hopefully she will use the mask more regularly..Without proper control of sleep apnea and good compliance there is a increased risk for hypertension, diabetes mellitus and nonrestorative sleep with hypersomnia which can increase risk for motor vehicle accidents.  Untreated sleep apnea is also a risk factor for development of atrial fibrillation, pulmonary hypertension, insulin resistance and stroke. The patient is also instructed to get the supplies from the [facility] and and change them on a regular basis.  A prescription for supplies has been sent to the [facility].  I have also discussed the good sleep hygiene habits and adequate amount of sleep needed for good health.  Obesity  1 with BMI is Body mass index is 34.69 kg/m².. I have discuss the relationship between the weight and sleep apnea. The benefit of weight loss in reducing severity of sleep apnea was discussed. Discussed diet and exercise with the patient to achieve ideal BMI.   Return in about 1 year (around 1/18/2025) for with smart card down load. . Patient's questions were answered.    1/18/2024  Alber Orozco MD  Sleep Medicine.  Medical Director,   [facility], [place] [facility] [place] [facility].

## 2024-01-23 NOTE — PROGRESS NOTES
"Chief Complaint  Previous Stage Ib ([id number]) ER/CA positive, HER-2/peter negative right breast cancer with subsequent metastatic disease identified 10/8/2017, history of right pulmonary embolism, cancer related pain, chemotherapy-induced diarrhea, chemotherapy-induced mucositis, chemotherapy-induced hand-foot syndrome    Subjective        History of Present Illness  Patient is seen back today in 6-week follow-up continuing on Xeloda 1000 mg in the morning, 1500 mg in the evening taken for 7 days on followed by 7 days off.  She was previously receiving Xgeva monthly, last given 12/13/2023.  We discussed at that visit transitioning to every 3-month Xgeva going forward and therefore she will not receive this again until March.      As she is reviewed back today she is noting more discomfort in her left hip.  She has actually been doing PT for her right hip with improvement of that hip but now pain that is shifted to the other hip.  We discussed she may have been compensating in the midst of having initial pain in the right hip.  She continues to ambulate with her cane.  She does take tramadol but is requesting a refill of Percocet as she feels that she needs that more often currently.    She continues to be burdened by the declining health of her  who is also a patient of ours.  She continues to follow-up with supportive oncology, due to be seen in 2 days. She denies other concerns at this time.    Objective   Vital Signs:   /83   Pulse 92   Temp 98.1 °F (36.7 °C) (Temporal)   Resp 18   Ht 154.9 cm (60.98\")   Wt 82.6 kg (182 lb 1.6 oz)   SpO2 96%   BMI 34.43 kg/m²     Physical Exam  Constitutional:       Appearance: She is well-developed.      Comments: Patient ambulates with the use of a cane   Eyes:      Conjunctiva/sclera: Conjunctivae normal.   Neck:      Thyroid: No thyromegaly.   Cardiovascular:      Rate and Rhythm: Normal rate and regular rhythm.      Heart sounds: No murmur heard.     No " friction rub. No gallop.   Pulmonary:      Effort: No respiratory distress.      Breath sounds: Normal breath sounds.   Abdominal:      General: Bowel sounds are normal. There is no distension.      Palpations: Abdomen is soft.      Tenderness: There is no abdominal tenderness.   Musculoskeletal:      Comments: Braces in place in the bilateral lower extremities   Lymphadenopathy:      Head:      Right side of head: No submandibular adenopathy.      Cervical: No cervical adenopathy.      Upper Body:      Right upper body: No supraclavicular adenopathy.      Left upper body: No supraclavicular adenopathy.   Skin:     General: Skin is warm and dry.      Findings: Rash present.      Comments: Erythema involving the palms is currently minimal.  The degree of skin peeling is mild and stable to improved.  There is minimal erythema involving the dorsal aspect of the fingers.  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      Cranial Nerves: No cranial nerve deficit.      Motor: No abnormal muscle tone.      Deep Tendon Reflexes: Reflexes normal.   Psychiatric:         Behavior: Behavior normal.       I have reexamined the patient and the results are consistent with the previously documented exam. She Sears, APRN       Result Review :   Results from last 7 days   Lab Units 01/24/24  0813   WBC 10*3/mm3 3.88   NEUTROS ABS 10*3/mm3 2.12   HEMOGLOBIN g/dL 12.9   HEMATOCRIT % 39.8   PLATELETS 10*3/mm3 251                  Assessment and Plan     *Previous Stage Ib ([id number]) right breast cancer:  Diagnosed May 2010 with excisional biopsy for invasive ductal carcinoma, 1.3 cm, grade 2, ER 90%, GA 80%, HER-2/peter negative (1+ IHC).    Subsequent right mastectomy in July 2010 with no residual breast malignancy, 1/5 sentinel lymph node with micrometastasis (0.25 mm).    Treated in the [facility] system with adjuvant AC ×4 cycles in  2010 (no taxanes administered due to underlying Charcot-Saloni-Tooth with peripheral neuropathy).    Adjuvant Femara (postmenopausal) initiated October 2010 with plan to treat ×10 years.    Genetic testing reportedly negative.    Developed osteopenia treated with Prolia beginning 2/27/13. Subsequently discontinued due to identification of metastatic disease.    *Recurrent/metastatic disease identified 10/8/17:  Disease involving thoracic spine with cord compression at T6, lumbosacral involvement, sternal and right sternoclavicular involvement.    Femara discontinued in 10/2017.    Radiation administered (in the [facility] system) to the thoracic spine beginning 10/19/17 treating T3-T9 to a dose of 24 gray in 6 fractions.  Evaluation with MRI 12/8/17 showing persistent T6 cord compression with persistent neurologic compromise requiring surgical treatment 12/11/17 with T6 laminectomy/corpectomy and T3-T9 fusion.  Pathology with metastatic carcinoma of breast origin, ER negative, VT negative, HER-2/peter negative (1+ IHC).  Additional staging evaluation 12/8/17 with no evidence of visceral metastatic disease, bone scan showing involvement of thoracic spine, sternum, left humerus, mid frontal bone.  No plane film correlate of left humerus lesion.  MRI lumbar spine with small intradural L3 metastasis.  CA 15-3 12/6/17- 17.  Palliative radiation therapy to L3 dural metastasis and left humerus initiated 1/15/18 (10 fractions), completed 1/26/18.  Hypercalcemia of malignancy with calcium in the 10-11 range.  Initiation of monthly Xgeva 1/23/18.  Baseline CT scan 1/30/18 with no evidence of visceral involvement.  Cluster of nodular opacities in the right lower lobe suspected to be infectious or related to bronchiolitis. Bone scan 1/30/18 showed postsurgical change in the thoracic spine, stable uptake in the frontal bone, no new areas of disease.  Initiation of palliative oral single agent Xeloda 2/7/18 2000 mg a.m., 1500 mg p.m.  for 14/21 days.   Following 3 cycles xeloda, bone scan 4/4/18 showed no change from the prior study.  CT scan 4/4/18 showed a small pericardial effusion of unclear significance as well as a subcutaneous nodule in the right lateral chest wall.  Subsequent evaluation with echocardiogram 4/17/18 showed no evidence of pericardial effusion.  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  We discussed the possibility of surgical excision of the right subcutaneous chest wall lesion for more definitive diagnosis.  Reviewed previous CT images dating back to 12/8/17 and the lesion was present even at that time measuring around 1.7 cm although not commented on in the radiology report.  As this appears to be an indolent low-grade process unrelated to her breast cancer, recommendee foregoing surgical excision at this time and monitoring this area on future scans.  The patient agreed.    Following 6 cycles of Xeloda, CT 6/6/18  showed stable findings, no evidence of progressive disease.  There was a comment regarding subcutaneous abnormality in the anterior abdominal wall and this was related to Lovenox injection sites.  Bone scan 6/6/18 showed no interval change.   CT scan and bone scan 8/13/18 following 9 cycles of Xeloda showed stable findings with no evidence of significant visceral metastases.  Her bone lesions appear stable on bone scan.  The spindle cell neoplasm in her right chest wall actually decreased in size from 2 cm down to 1.6 cm.    The patient experienced some symptoms of diarrhea, anorexia, generalized weakness during cycle 9 Xeloda it was unclear whether this was related to a viral gastroenteritis or toxicity from treatment.  Symptoms recurred during cycle 10 and treatment was cut short by 2 days.  Symptoms attributed to Xeloda.  With cycle 11, dose and schedule altered  to 1500 mg twice daily for 7 days on followed by 7 days off .  CT scan 9/9/2020 with no significant changes.  Bone scan 9/9/2020 read as unchanged from prior studies however did note an area of slight activity in the medial left femur.  Contacted radiology and although this was not noted on prior reports appears to have been present.  Subsequent MRI left femur 9/21/2020 with cortical thickening and periosteal edema left iliac us muscle insertion to the medial left femur with no evidence of metastatic disease, favored to represent periosteal change secondary to insertional tendinitis.  Severe hand-foot symptoms causing sclerodactyly and limitation in finger movement prompted change in dosing in July 2021 with Xeloda decreased to 1000 mg a.m., 1500 mg p.m. for 7 days on followed by 7 days off.  Patient was experiencing severe hand-foot syndrome related to Xeloda having developed skin peeling, sclerodactyly with limited use of her hands.  On 3/31/2022, Xeloda was held to allow for recovery.  Patient resumed Xeloda on 4/18/2022.  Patient required hospitalization 5/29 - 6/1/2022 with presumed viral gastroenteritis that was exacerbated by Xeloda.  Xeloda held briefly, resumed on 6/6/2022.  Patient again with worsening sclerodactyly, Xeloda held for 2 weeks from 10/3 - 10/17/2022, resumed at prior dose with improvement in symptoms.  Scans on 3/3/2023 with CT chest abdomen pelvis and bone scan showing stable findings.  CT chest abdomen pelvis 7/3/2023 with questionable 1 mm change in size right lower lobe nodule.  Additional small pulmonary nodules stable.  Stable bony metastatic disease.  Bone scan on 7/7/2023 however showed slight progression focal involvement right ischium and superior pubic ramus (new ischial lesion superior pubic ramus compared to 10/24/2022 and more conspicuous compared to 3/3/2023).  Metastatic lesion right inferior pubic ramus and ischial tuberosity increased in size since October 2022 however  stable from 3/3/2023.  MRI cervical spine 7/3/2023 with no metastatic involvement however identification of the lesion at T2, recommended dedicated thoracic spine MRI to evaluate further.  Given the minimal extent and slow degree of progression, elected to continue oral Xeloda and evaluate further with MRI pelvis and thoracic spine.  Consideration of biopsy pelvic metastasis for repeat ER/OH/HER2/peter testing.    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  CA 15-3 on 7/10/2023 was 12.2, uninformative.  MRI thoracic spine 7/27/2023 with 1 cm metastatic focus seen in L1  MRI pelvis 7/27/2023 with metastatic foci identified right superior pubic ramus, right ischial tuberosity, inferior pubic ramus, L5 body.  Chronic appearing posttraumatic and/or degenerative change in the posterior right ilium adjacent SI joint.  CT-guided biopsy right pubic ramus lesion on 8/31/2023.  Pathology showed no evidence of malignancy, showed markedly calcified and sclerotic mature lamellar bone.  Attempted decalcification but no evidence of malignancy.  CT chest abdomen pelvis 9/8/2023 and bone scan 9/11/2023 with stable findings.  CT chest abdomen pelvis 12/8/2023 with possible slight increase in right chest wall subcutaneous lesion (1.8 x 1.1 up to 1.8 x 1.4 cm) although may be related to altered positioning.  Nonpathologically enlarged right axillary and subpectoral lymph nodes slightly increased (patient notes she received RSV vaccine right arm just prior to scan).  Bone scan 12/11/2023 with stable findings.  Patient is reviewed back today in 6-week follow-up continuing on Xeloda 1000 mg a.m., 1500 mg p.m. 7 days on followed by 7 days off.  She was previously receiving Xgeva monthly however as of 12/13/2023 per Dr. Hancock we are transitioning to every 3-month dosing, therefore not due again for 6 more weeks. In 5 weeks she will undergo repeat CT scans and bone scan and see Dr. Hancock  back in 6 weeks for scan review and Xgeva.     *Right pulmonary embolism:  Diagnosed on CT angiogram 10/21/17 involving small right lower lobe pulmonary artery.  Lower extremity Dopplers negative.  Bilateral lower extremity Dopplers negative again 12/5/17.  Received chronic Lovenox 1 mg/kg twice per day, transitioned to oral Eliquis in February 2019, continuing on Eliquis 5 mg twice daily.  Patient continues on Eliquis 5 mg twice daily    *Cancer related pain:  Previously receiving Duragesic 50 µg patch every 72 hours along with Dilaudid 4 mg as needed for breakthrough pain  The patient's pain improved over time and she was able to discontinue both Duragesic and Dilaudid in the interval.  Patient does take occasional Flexeril at bedtime due to back spasm/pain when she has been more active.  The patient does have some occasional aggravation of her chronic back pain and does use tramadol 50 mg every 8 hours as needed  Patient was receiving tramadol 50 mg every 8-12 hours as needed in addition to hydrocodone 5/325 every 4 hours as needed.  She was also taking OTC NSAIDs.  Patient developed nausea related to hydrocodone and was transitioned to Percocet 5/325 every 4 hours as needed, advised to stop taking NSAIDs with ongoing anticoagulation.  Patient notes that the pain is currently under fair control.    *Chemotherapy-induced diarrhea with subsequent C. difficile colitis in the setting of previous ulcerative colitis and then identification of enteropathogenic E. coli:  Patient with reported history of ulcerative colitis, continues on mesalamine.  The patient developed initial diarrhea related to Xeloda at regular dosing.  Symptoms improved on reduced dose Xeloda  Flare of symptoms in October 2018 with apparent finding of C. difficile colitis by GI, treated with course of oral vancomycin with improvement in symptoms.  Patient notes minimal intermittent diarrhea/loose stools on Xeloda requiring occasional dosing of  Imodium.    Patient required hospitalization 5/29 - 6/1/2022 with presumed viral gastroenteritis that was exacerbated by Xeloda.  Xeloda held briefly, resumed on 6/6/2022.  Patient's  developed C. difficile colitis and patient was experiencing increase in diarrhea.  Stool studies performed 8/4/22 negative C. difficile however GI PCR was positive for enteropathogenic E. coli.  Given patient's symptoms and immunocompromise status it was elected to treat her with azithromycin 500 mg daily x3 days beginning 8/5/2022  Diarrhea resolved  Patient underwent colonoscopy on 2/28/2023 with findings of diverticulosis  Patient notes that bowel function has been stable, does have some soft stools the week on treatment with Xeloda.    *Traumatic left tibia/fibular fracture:  Status post ORIF 12/6/17  Specimen was sent for pathologic review, negative for evidence of malignancy    *Hypercalcemia:  Suspect hypercalcemia of malignancy, calcium in  10-11 range previously.  Calcium normalized following initiation of monthly Xgeva on 1/23/18.    *Chemotherapy-induced mucositis:  Patient had a minimal degree of mucositis with cycle 2.  The patient has magic mouthwash to use as needed.  No subsequent mucositis.    *Recurrent UTI, bladder wall thickening on CT:  Patient had an enterococcal UTI on 3/2/18 sensitive to nitrofurantoin and received treatment, unclear how long.  Recurrent UTI 3/20/18 with urine culture growing Klebsiella, initially treated with nitrofurantoin, transitioned to Levaquin.  CT 4/4/18 with diffuse bladder wall thickening with increased nodular thickening at the left base.  Referral to urogynecology Dr. May Johnson.  She was placed on a prophylactic dose of trimethoprim 100 mg daily, bladder wall thickening felt to be related to recent recurrent urinary tract infections.  Patient with urinary symptoms, treated with course of Macrobid at the end of December 2018, urine culture however was negative  12/31/18.  Patient was found to have Klebsiella UTI 7/29/2019 which was successfully treated with Macrobid with complete resolution of symptoms.  CT 8/10/2021 with diffuse bladder wall thickening new from 5/17/2021 (however seen on multiple prior scans).    UTI on 10/18/2021 with E. coli greater than 100,000 colonies that was pansensitive, treated with Macrobid x7 days  With ongoing/recurrent UTIs patient was seen by urogynecology and initiated suppressive therapy with trimethoprim 100 mg daily in December 2021.  She has not experienced any further urinary tract infections.  CT abdomen pelvis 3/28/2022 does show diffuse thickening of urinary bladder as has been seen on prior studies indicative of cystitis.  Patient notes that she did stop taking trimethoprim on a daily basis.    Patient with urine culture on 5/5/2023 that grew E. coli and she received antibiotic treatment from urogynecology.    Urine culture on 8/23/2023 with greater than 100,000 colonies of E. coli resistant to ampicillin and Bactrim.  Received 5-day course of Cipro with resolution of symptoms.  Patient did have UTI with E. coli on culture 10/2/2023, received a course of Augmentin.  Patient reports that she has just recently finished a course of antibiotics prescribed by urogynecology.    *Mobility:  The patient underwent an intensive course of rehabilitation at [facility].  She graduated from her outpatient course November 2018.  Overall the patient has improved dramatically in terms of mobility,   Patient developed significant callus formation lateral aspect of the left plantar surface.  She did meet with her brace specialist and additional padding was added in this area.  She continues to follow-up with her podiatrist Dr. Ware.    *Depression:  The patient is continuing follow-up in the supportive oncology clinic and is currently continuing on Cymbalta 30 mg twice daily as well as gabapentin 600 mg nightly, temazepam 15 mg nightly as needed,  Provigil 100 mg daily.  Patient does continue to attend support groups at [employer].  The patient does continue routine follow-up in supportive oncology clinic.    Patient does have multiple stressors with her 's malignancy and chemotherapy as well as her autistic son who is living with them at home.  Patient continues to have difficulty with stress and anxiety, is being followed by supportive oncology, last seen on 12/1/2023.    *Hand-foot syndrome secondary to Xeloda:  Patient continues with frequent application of emollient cream to the hands and feet  Symptoms increased significantly requiring a 1 week delay in cycle 18 Xeloda as noted above.  Symptoms did improve and she continued on the same dose.    Patient was referred to dermatology and has been continuing on triamcinolone 0.1% cream used for 1 week on followed by 1 week off which led to some further improvement.   Progressive palmar erythema with development of sclerodactyly and effect on dexterity.  Addition of urea-based cream 3 times daily.  Patient with continued difficulty regarding erythema of her hands that extended onto the dorsal aspect and was causing contractures/sclerodactyly in her fingers affecting her dexterity.  In July 2021, held Xeloda for an additional week and then reduced dose from 1500 mg twice daily down to 1000 mg a.m. and 1500 mg p.m. and continued on a 7-day on followed by 7-day off schedule.  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  On 3/31/2022, Xeloda was held to allow for recovery.  Patient resumed Xeloda on 4/18/2022.  Patient developed worsening sclerodactyly affecting dexterity that required Xeloda to again be briefly held for 2 weeks from 10/3 - 10/17/2022.  Treatment resumed  at prior dose after improvement in symptoms.  Patient continues to use emollient cream frequently (currently 5 times daily) and topical triamcinolone 0.1% twice daily intermittently (2 weeks on followed by 2 weeks off as instructed by dermatology).  The patient has minimal erythema on the palms, degree of scaling and peeling is minimal currently.  Sclerodactyly is stable.  Patient reports that symptoms are tolerable at this time.  As before, if symptoms worsen we will consider taking an additional week off of Xeloda in the future.    *Evidence of steatosis on scans with mild intermittent elevated liver function studies:  Liver function studies increased 8/20/19 with ALT 98, AST 70, normal total bilirubin.  Negative viral hepatitis A, B, and C panel 8/23/18  Likely related to hepatic steatosis.   Subsequent improvement in LFTs  LFTs remain normal today.    *Chemotherapy induced leukopenia:  WBC today 3.8 with normal differential    *GERD, question of celiac disease:  Patient with significant history of reflux  Patient currently receiving Protonix 40 mg daily daily and Carafate 1 g 4 times daily as needed  Patient required hospitalization 5/29 - 6/1/2022 with presumed viral gastroenteritis that was exacerbated by Xeloda.    EGD performed 5/31/2022 during hospitalization with biopsy that showed focal blunting of villi and atrophy with slight increase in intraepithelial lymphocytes.  Changes were minimal but recommended correlation with serology to exclude celiac disease.  Celiac serology 8/8/2022 negative  Patient previously developed worsening reflux symptoms, temporarily increase Protonix to 40 mg twice daily and we did add Carafate 1 g 4 times daily.    She is taking Protonix 40 mg once daily, is not taking Carafate.    *Insomnia:  Patient with prior paradoxical reaction to Benadryl  Improved previously on temazepam 15 mg nightly as needed.   Patient noted subsequently that temazepam was having no effect.   Patient  increased gabapentin to 900 mg nightly, discontinued temazepam    *Health maintenance:  Patient notes that she has a history of colon polyps as well as ulcerative colitis and was due for a follow-up colonoscopy on 9/12/2019.  We did discuss there is no necessity to pursue colonoscopy in the setting of her metastatic breast cancer.    The patient did undergo colonoscopy on 2/7/2020 with findings of muscular hypertrophy and diverticulosis.   Patient did wish to proceed with further colonoscopic evaluation, performed on 12/20/2022 with poor prep.  Repeat 2/28/2023 with diverticulosis.    *Bilateral shoulder pain:  Chronic difficulty with right shoulder although she has not complained of this in prior visits to our office.  She reported difficulty with abduction.    The patient did undergo MRI of her right shoulder on 11/21/2019 at an outside facility showing multiple abnormalities including supraspinatus tendinosis, labral tear but no evidence of metastatic disease.  Patient developed pain in the left shoulder, was seen by orthopedics and underwent steroid injection with some improvement.  Right shoulder stable.  Patient did undergo physical therapy with some improvement.  She continues to use tramadol 50 mg every 8-12 hours as needed.  Note that current CT 10/20/2022 made notation of left shoulder probable bursitis.    Patient continues with bilateral shoulder pain, left greater than right which does wax and wane.    *Ocular changes in part related to Xeloda:  Patient experienced a mild degree of blurred vision as well as burning and pruritus.  She was seen by her ophthalmologist and was placed on xiidra ophthalmic drops which have helped.  Likely both issues are to some extent related to Xeloda.  Symptoms did worsen and patient was seen by ophthalmologist at [facility].  She is now using an ocular lubricant at night in addition to artificial tears which have helped.  Symptoms currently stable to  improved    *Elevated glucose:  It is noted the patient's glucose at the last few visits has been in the high 100 range, postprandial.  She has had a hemoglobin A1c that has been in the high 5-6 range in the past, on 5/1/2019 at 5.7  Hemoglobin A1c was last checked in 11/12/2021 at 5.8    *Possible loosening of pedicle screw at T3:  CT cervical spine 5/17/2021 showed loosening of the left pedicle screw at T3.  Patient referred to orthopedics and was seen by Dr. Nayak who felt that there were no concerning findings on the scan    *Iron deficiency anemia  Labs on 5/2/2022 with hemoglobin 10.8.  Additional labs with iron 48, ferritin 21.2, iron saturation 11%, TIBC 4 and 32, folate greater than 20, B12 greater than 2000.    Attempted treatment with oral iron daily however patient was intolerant  Patient subsequently received Venofer 300 mg weekly x4 beginning 6/6/2022 and completed on 6/27/2022  Subsequent iron studies on 7/11/2022 showed improvement with iron 62, ferritin 345, iron saturation 18%, TIBC 336.  Hemoglobin had improved up to 12.7 at that time.  Repeat iron studies on 10/3/2022 showed iron replete status with hemoglobin 13.7, iron 121, ferritin 208.7, iron saturation 31%, TIBC 392.  Hemoglobin currently normal at 12.9     Plan:  Continue palliative single agent Xeloda 1000 mg a.m., 1500 mg in the p.m. 7 days on followed by 7 days off   Continue Eliquis 5 mg twice daily  The patient will continue frequent use of emollient cream currently 5 times daily and continue periodic triamcinolone cream 0.1% twice daily (provided by dermatology) 2 weeks on followed by 2 weeks off as prescribed  Continue Protonix 40 mg daily  Continue ocular lubricating gel nightly per ophthalmology  Continue Provigil 100 mg daily and Cymbalta 30 mg twice daily.  Patient continues routine follow-up with supportive oncology   Patient will continue gabapentin 900 mg at night.   Patient continues on tramadol 50 mg every 8 hours as needed  for pain  Continue Percocet 5/325 every 4 hours as needed for pain  The patient continues follow-up with her podiatrist and brace specialist regarding callus formation and scaling in the lateral left plantar surface  Patient continues follow-up with supportive oncology  MD visit in 5 weeks with CBC, CMP, magnesium, phosphorus.  Patient will be due for Xgeva.  1 week prior to visit CT chest abdomen pelvis and bone scan.     Patient continuing on high risk medication requiring intensive monitoring.

## 2024-01-24 ENCOUNTER — OFFICE VISIT (OUTPATIENT)
Dept: ONCOLOGY | Facility: CLINIC | Age: 64
End: 2024-01-24
Payer: MEDICARE

## 2024-01-24 ENCOUNTER — LAB (OUTPATIENT)
Dept: LAB | Facility: HOSPITAL | Age: 64
End: 2024-01-24
Payer: MEDICARE

## 2024-01-24 VITALS
DIASTOLIC BLOOD PRESSURE: 83 MMHG | SYSTOLIC BLOOD PRESSURE: 146 MMHG | RESPIRATION RATE: 18 BRPM | OXYGEN SATURATION: 96 % | HEART RATE: 92 BPM | TEMPERATURE: 98.1 F | BODY MASS INDEX: 34.38 KG/M2 | WEIGHT: 182.1 LBS | HEIGHT: 61 IN

## 2024-01-24 DIAGNOSIS — Z17.1 MALIGNANT NEOPLASM OF OVERLAPPING SITES OF RIGHT BREAST IN FEMALE, ESTROGEN RECEPTOR NEGATIVE: ICD-10-CM

## 2024-01-24 DIAGNOSIS — G89.3 CANCER ASSOCIATED PAIN: ICD-10-CM

## 2024-01-24 DIAGNOSIS — C50.811 MALIGNANT NEOPLASM OF OVERLAPPING SITES OF RIGHT BREAST IN FEMALE, ESTROGEN RECEPTOR NEGATIVE: Primary | ICD-10-CM

## 2024-01-24 DIAGNOSIS — C50.919 PRIMARY MALIGNANT NEOPLASM OF BREAST WITH METASTASIS: ICD-10-CM

## 2024-01-24 DIAGNOSIS — Z79.899 HIGH RISK MEDICATION USE: ICD-10-CM

## 2024-01-24 DIAGNOSIS — C50.811 MALIGNANT NEOPLASM OF OVERLAPPING SITES OF RIGHT BREAST IN FEMALE, ESTROGEN RECEPTOR NEGATIVE: ICD-10-CM

## 2024-01-24 DIAGNOSIS — Z17.1 MALIGNANT NEOPLASM OF OVERLAPPING SITES OF RIGHT BREAST IN FEMALE, ESTROGEN RECEPTOR NEGATIVE: Primary | ICD-10-CM

## 2024-01-24 LAB
ALBUMIN SERPL-MCNC: 3.9 G/DL (ref 3.5–5.2)
ALBUMIN/GLOB SERPL: 1.4 G/DL
ALP SERPL-CCNC: 58 U/L (ref 39–117)
ALT SERPL W P-5'-P-CCNC: 11 U/L (ref 1–33)
ANION GAP SERPL CALCULATED.3IONS-SCNC: 7.5 MMOL/L (ref 5–15)
AST SERPL-CCNC: 22 U/L (ref 1–32)
BASOPHILS # BLD AUTO: 0.05 10*3/MM3 (ref 0–0.2)
BASOPHILS NFR BLD AUTO: 1.3 % (ref 0–1.5)
BILIRUB SERPL-MCNC: 0.3 MG/DL (ref 0–1.2)
BUN SERPL-MCNC: 17 MG/DL (ref 8–23)
BUN/CREAT SERPL: 20.5 (ref 7–25)
CALCIUM SPEC-SCNC: 9.4 MG/DL (ref 8.6–10.5)
CHLORIDE SERPL-SCNC: 103 MMOL/L (ref 98–107)
CO2 SERPL-SCNC: 31.5 MMOL/L (ref 22–29)
CREAT SERPL-MCNC: 0.83 MG/DL (ref 0.57–1)
DEPRECATED RDW RBC AUTO: 50.6 FL (ref 37–54)
EGFRCR SERPLBLD CKD-EPI 2021: 79.3 ML/MIN/1.73
EOSINOPHIL # BLD AUTO: 0.21 10*3/MM3 (ref 0–0.4)
EOSINOPHIL NFR BLD AUTO: 5.4 % (ref 0.3–6.2)
ERYTHROCYTE [DISTWIDTH] IN BLOOD BY AUTOMATED COUNT: 14 % (ref 12.3–15.4)
GLOBULIN UR ELPH-MCNC: 2.8 GM/DL
GLUCOSE SERPL-MCNC: 120 MG/DL (ref 65–99)
HCT VFR BLD AUTO: 39.8 % (ref 34–46.6)
HGB BLD-MCNC: 12.9 G/DL (ref 12–15.9)
IMM GRANULOCYTES # BLD AUTO: 0 10*3/MM3 (ref 0–0.05)
IMM GRANULOCYTES NFR BLD AUTO: 0 % (ref 0–0.5)
LYMPHOCYTES # BLD AUTO: 1.21 10*3/MM3 (ref 0.7–3.1)
LYMPHOCYTES NFR BLD AUTO: 31.2 % (ref 19.6–45.3)
MCH RBC QN AUTO: 32.5 PG (ref 26.6–33)
MCHC RBC AUTO-ENTMCNC: 32.4 G/DL (ref 31.5–35.7)
MCV RBC AUTO: 100.3 FL (ref 79–97)
MONOCYTES # BLD AUTO: 0.29 10*3/MM3 (ref 0.1–0.9)
MONOCYTES NFR BLD AUTO: 7.5 % (ref 5–12)
NEUTROPHILS NFR BLD AUTO: 2.12 10*3/MM3 (ref 1.7–7)
NEUTROPHILS NFR BLD AUTO: 54.6 % (ref 42.7–76)
NRBC BLD AUTO-RTO: 0 /100 WBC (ref 0–0.2)
PLATELET # BLD AUTO: 251 10*3/MM3 (ref 140–450)
PMV BLD AUTO: 10.4 FL (ref 6–12)
POTASSIUM SERPL-SCNC: 4.2 MMOL/L (ref 3.5–5.2)
PROT SERPL-MCNC: 6.7 G/DL (ref 6–8.5)
RBC # BLD AUTO: 3.97 10*6/MM3 (ref 3.77–5.28)
SODIUM SERPL-SCNC: 142 MMOL/L (ref 136–145)
WBC NRBC COR # BLD AUTO: 3.88 10*3/MM3 (ref 3.4–10.8)

## 2024-01-24 PROCEDURE — 80053 COMPREHEN METABOLIC PANEL: CPT

## 2024-01-24 PROCEDURE — 36415 COLL VENOUS BLD VENIPUNCTURE: CPT

## 2024-01-24 PROCEDURE — 85025 COMPLETE CBC W/AUTO DIFF WBC: CPT

## 2024-01-24 RX ORDER — OXYCODONE HYDROCHLORIDE AND ACETAMINOPHEN 5; 325 MG/1; MG/1
1 TABLET ORAL EVERY 4 HOURS PRN
Qty: 60 TABLET | Refills: 0 | Status: SHIPPED | OUTPATIENT
Start: 2024-01-24

## 2024-01-25 ENCOUNTER — SPECIALTY PHARMACY (OUTPATIENT)
Dept: PHARMACY | Facility: HOSPITAL | Age: 64
End: 2024-01-25
Payer: MEDICARE

## 2024-01-30 ENCOUNTER — OFFICE VISIT (OUTPATIENT)
Dept: INTERNAL MEDICINE | Facility: CLINIC | Age: 64
End: 2024-01-30
Payer: MEDICARE

## 2024-01-30 VITALS
BODY MASS INDEX: 34.55 KG/M2 | HEIGHT: 61 IN | OXYGEN SATURATION: 96 % | SYSTOLIC BLOOD PRESSURE: 120 MMHG | HEART RATE: 100 BPM | WEIGHT: 183 LBS | DIASTOLIC BLOOD PRESSURE: 68 MMHG

## 2024-01-30 DIAGNOSIS — N39.0 RECURRENT UTI: ICD-10-CM

## 2024-01-30 DIAGNOSIS — N30.01 ACUTE CYSTITIS WITH HEMATURIA: ICD-10-CM

## 2024-01-30 DIAGNOSIS — R93.89 ABNORMAL CT SCAN: ICD-10-CM

## 2024-01-30 DIAGNOSIS — R35.0 URINE FREQUENCY: Primary | ICD-10-CM

## 2024-01-30 DIAGNOSIS — R31.0 GROSS HEMATURIA: ICD-10-CM

## 2024-01-30 LAB
BILIRUB BLD-MCNC: ABNORMAL MG/DL
CLARITY, POC: ABNORMAL
COLOR UR: ABNORMAL
EXPIRATION DATE: ABNORMAL
GLUCOSE UR STRIP-MCNC: ABNORMAL MG/DL
KETONES UR QL: ABNORMAL
LEUKOCYTE EST, POC: ABNORMAL
Lab: ABNORMAL
NITRITE UR-MCNC: NEGATIVE MG/ML
PH UR: 5.5 [PH] (ref 5–8)
PROT UR STRIP-MCNC: ABNORMAL MG/DL
RBC # UR STRIP: ABNORMAL /UL
SP GR UR: 1.02 (ref 1–1.03)
UROBILINOGEN UR QL: NORMAL

## 2024-01-30 PROCEDURE — 99214 OFFICE O/P EST MOD 30 MIN: CPT | Performed by: INTERNAL MEDICINE

## 2024-01-30 PROCEDURE — 3074F SYST BP LT 130 MM HG: CPT | Performed by: INTERNAL MEDICINE

## 2024-01-30 PROCEDURE — 81003 URINALYSIS AUTO W/O SCOPE: CPT | Performed by: INTERNAL MEDICINE

## 2024-01-30 PROCEDURE — 3078F DIAST BP <80 MM HG: CPT | Performed by: INTERNAL MEDICINE

## 2024-01-30 RX ORDER — PHENAZOPYRIDINE HYDROCHLORIDE 200 MG/1
200 TABLET, FILM COATED ORAL 3 TIMES DAILY PRN
Qty: 15 TABLET | Refills: 1 | Status: SHIPPED | OUTPATIENT
Start: 2024-01-30

## 2024-01-30 RX ORDER — CEPHALEXIN 500 MG/1
500 CAPSULE ORAL 2 TIMES DAILY
Qty: 14 CAPSULE | Refills: 0 | Status: SHIPPED | OUTPATIENT
Start: 2024-01-30

## 2024-02-01 LAB
BACTERIA UR CULT: NORMAL
BACTERIA UR CULT: NORMAL

## 2024-02-02 ENCOUNTER — TELEPHONE (OUTPATIENT)
Dept: INTERNAL MEDICINE | Facility: CLINIC | Age: 64
End: 2024-02-02
Payer: MEDICARE

## 2024-02-02 NOTE — TELEPHONE ENCOUNTER
Pt informed    ----- Message from Jazmine Chanel MD sent at 2/1/2024  9:46 PM EST -----  Urine culture is negtive for infection. Patient can stop antibiotic. Would still f/u w urology.    jw

## 2024-02-13 ENCOUNTER — OFFICE VISIT (OUTPATIENT)
Dept: INTERNAL MEDICINE | Facility: CLINIC | Age: 64
End: 2024-02-13
Payer: MEDICARE

## 2024-02-13 VITALS
WEIGHT: 183 LBS | DIASTOLIC BLOOD PRESSURE: 72 MMHG | BODY MASS INDEX: 34.55 KG/M2 | SYSTOLIC BLOOD PRESSURE: 160 MMHG | HEIGHT: 61 IN

## 2024-02-13 DIAGNOSIS — N39.0 RECURRENT UTI: ICD-10-CM

## 2024-02-13 DIAGNOSIS — R31.9 URINARY TRACT INFECTION WITH HEMATURIA, SITE UNSPECIFIED: Primary | ICD-10-CM

## 2024-02-13 DIAGNOSIS — R93.5 ABNORMAL CT OF THE ABDOMEN: ICD-10-CM

## 2024-02-13 DIAGNOSIS — R31.29 OTHER MICROSCOPIC HEMATURIA: ICD-10-CM

## 2024-02-13 DIAGNOSIS — N39.0 URINARY TRACT INFECTION WITH HEMATURIA, SITE UNSPECIFIED: Primary | ICD-10-CM

## 2024-02-13 LAB
BILIRUB BLD-MCNC: NEGATIVE MG/DL
CLARITY, POC: ABNORMAL
COLOR UR: ABNORMAL
EXPIRATION DATE: ABNORMAL
GLUCOSE UR STRIP-MCNC: NEGATIVE MG/DL
KETONES UR QL: NEGATIVE
LEUKOCYTE EST, POC: ABNORMAL
Lab: ABNORMAL
NITRITE UR-MCNC: NEGATIVE MG/ML
PH UR: 5.5 [PH] (ref 5–8)
PROT UR STRIP-MCNC: ABNORMAL MG/DL
RBC # UR STRIP: ABNORMAL /UL
SP GR UR: 1.02 (ref 1–1.03)
UROBILINOGEN UR QL: ABNORMAL

## 2024-02-13 PROCEDURE — 81003 URINALYSIS AUTO W/O SCOPE: CPT | Performed by: INTERNAL MEDICINE

## 2024-02-13 PROCEDURE — 3078F DIAST BP <80 MM HG: CPT | Performed by: INTERNAL MEDICINE

## 2024-02-13 PROCEDURE — 3077F SYST BP >= 140 MM HG: CPT | Performed by: INTERNAL MEDICINE

## 2024-02-13 PROCEDURE — 1159F MED LIST DOCD IN RCRD: CPT | Performed by: INTERNAL MEDICINE

## 2024-02-13 PROCEDURE — 99214 OFFICE O/P EST MOD 30 MIN: CPT | Performed by: INTERNAL MEDICINE

## 2024-02-13 PROCEDURE — 1160F RVW MEDS BY RX/DR IN RCRD: CPT | Performed by: INTERNAL MEDICINE

## 2024-02-13 RX ORDER — LOSARTAN POTASSIUM 50 MG/1
50 TABLET ORAL DAILY
Qty: 90 TABLET | Refills: 3 | Status: SHIPPED | OUTPATIENT
Start: 2024-02-13

## 2024-02-14 ENCOUNTER — TELEMEDICINE (OUTPATIENT)
Dept: PSYCHIATRY | Facility: CLINIC | Age: 64
End: 2024-02-14
Payer: MEDICARE

## 2024-02-14 DIAGNOSIS — F41.1 GENERALIZED ANXIETY DISORDER: Primary | ICD-10-CM

## 2024-02-15 LAB
BACTERIA UR CULT: NORMAL
BACTERIA UR CULT: NORMAL

## 2024-02-16 ENCOUNTER — TELEPHONE (OUTPATIENT)
Dept: INTERNAL MEDICINE | Facility: CLINIC | Age: 64
End: 2024-02-16
Payer: MEDICARE

## 2024-02-17 DIAGNOSIS — G89.3 CANCER ASSOCIATED PAIN: ICD-10-CM

## 2024-02-19 RX ORDER — TRAMADOL HYDROCHLORIDE 50 MG/1
50 TABLET ORAL EVERY 8 HOURS PRN
Qty: 90 TABLET | Refills: 0 | Status: SHIPPED | OUTPATIENT
Start: 2024-02-19

## 2024-03-06 ENCOUNTER — APPOINTMENT (OUTPATIENT)
Dept: NUCLEAR MEDICINE | Facility: HOSPITAL | Age: 64
End: 2024-03-06
Payer: MEDICARE

## 2024-03-06 ENCOUNTER — TELEPHONE (OUTPATIENT)
Dept: ONCOLOGY | Facility: CLINIC | Age: 64
End: 2024-03-06

## 2024-03-06 ENCOUNTER — HOSPITAL ENCOUNTER (OUTPATIENT)
Dept: CT IMAGING | Facility: HOSPITAL | Age: 64
End: 2024-03-06
Payer: MEDICARE

## 2024-03-06 NOTE — TELEPHONE ENCOUNTER
Caller: Martha Davey    Relationship to patient: Self    Best call back number: [phone]    Chief complaint: PT CALLED TO RESCHEDULE    Type of visit: SCANS AND FOLLOW UP      If rescheduling, when is the original appointment: 3-6-24 AND 3-12-24

## 2024-03-07 ENCOUNTER — TELEPHONE (OUTPATIENT)
Dept: ONCOLOGY | Facility: CLINIC | Age: 64
End: 2024-03-07
Payer: MEDICARE

## 2024-03-08 RX ORDER — OXYCODONE HYDROCHLORIDE AND ACETAMINOPHEN 5; 325 MG/1; MG/1
1 TABLET ORAL EVERY 4 HOURS PRN
Qty: 60 TABLET | Refills: 0 | Status: SHIPPED | OUTPATIENT
Start: 2024-03-08

## 2024-03-14 ENCOUNTER — TELEMEDICINE (OUTPATIENT)
Dept: PSYCHIATRY | Facility: CLINIC | Age: 64
End: 2024-03-14
Payer: MEDICARE

## 2024-03-14 DIAGNOSIS — F41.1 GENERALIZED ANXIETY DISORDER: Primary | ICD-10-CM

## 2024-03-14 NOTE — PROGRESS NOTES
Date: March 14, 2024  Time In: 1200  Time Out: 1242  This provider is located at home address for [facility] (through [facility]), [address], [place] [address] using a secure [place] Video Visit through [employer]. Patient is being seen remotely via telehealth at home address in [place] and stated they are in a secure environment for this session. The patient's condition being diagnosed/treated is appropriate for telemedicine. The provider identified herself as well as her credentials. The patient, and/or patients guardian, consent to be seen remotely, and when consent is given they understand that the consent allows for patient identifiable information to be sent to a third party as needed. They may refuse to be seen remotely at any time. The electronic data is encrypted and password protected, and the patient and/or guardian has been advised of the potential risks to privacy not withstanding such measures.     You have chosen to receive care through a telehealth visit.  Do you consent to use a video/audio connection for your medical care today? Yes    PROGRESS NOTE  Data:  Martha Davey is a 63 y.o. female who presents today for follow up    Chief Complaint: anxiety    History of Present Illness: Pt reports that her  recently decided to placed in Hospice care. Pt reports that after her  met with his doctor it was agreed to focus on quality of life. Pt reports that she feels overwhelmed due to multiple tasks that needs to be addressed and completed and that she is the only individual that is able to complete these tasks at this time. Pt reports that she also has issues with her son's benefits from the state getting discontinued and needs to address this further but is struggling to find the time to do so.        Clinical Maneuvering/Intervention:    (Scales based on 0 - 10 with 10 being the worst)  Depression: 3 Anxiety: 5       Assisted patient  in processing above session content; acknowledged and normalized patient’s thoughts, feelings, and concerns.  Rationalized patient thought process regarding recent stressors and life events. Discussed triggers associated with patient's emotions. Also discussed coping skills for patient to implement. Discussed expectations and encouraged to reduce number of tasks to be competed each day in efforts for daily goals to become realistic and achievable.     Allowed patient to freely discuss issues without interruption or judgment. Provided safe, confidential environment to facilitate the development of positive therapeutic relationship and encourage open, honest communication. Assisted patient in identifying risk factors which would indicate the need for higher level of care including thoughts to harm self or others and/or self-harming behavior and encouraged patient to contact this office, call 911, or present to the nearest emergency room should any of these events occur. Discussed crisis intervention services and means to access. Patient adamantly and convincingly denies current suicidal or homicidal ideation or perceptual disturbance.    Assessment:   Assessment   Patient appears to maintain relative stability as compared to their baseline.  However, patient continues to struggle with anxiety which continues to cause impairment in important areas of functioning.  A result, they can be reasonably expected to continue to benefit from treatment and would likely be at increased risk for decompensation otherwise.    Mental Status Exam:   Hygiene:   good  Cooperation:  Cooperative  Eye Contact:  Good  Psychomotor Behavior:  Appropriate  Affect:  Full range  Mood: anxious  Speech:  Normal  Thought Process:  Linear  Thought Content:  Mood congruent  Suicidal:  None  Homicidal:  None  Hallucinations:  None  Delusion:  None  Memory:  Intact  Orientation:  Person, Place, Time and Situation  Reliability:  fair  Insight:   Fair  Judgement:  Fair  Impulse Control:  Fair  Physical/Medical Issues:  No        Patient's Support Network Includes:      Functional Status: Mild impairment     Progress toward goal: Not at goal    Prognosis: Fair with Ongoing Treatment            Plan:    Patient will continue in individual outpatient therapy with focus on improved functioning and coping skills, maintaining stability, and avoiding decompensation and the need for higher level of care.    Patient will adhere to medication regimen as prescribed and report any side effects. Patient will contact this office, call 911 or present to the nearest emergency room should suicidal or homicidal ideations occur. Provide Cognitive Behavioral Therapy and Solution Focused Therapy to improve functioning, maintain stability, and avoid decompensation and the need for higher level of care.     Return in about 3 weeks, or earlier if symptoms worsen or fail to improve.           VISIT DIAGNOSIS:     ICD-10-CM ICD-9-CM   1. Generalized anxiety disorder  F41.1 300.02        Diagnoses and all orders for this visit:    1. Generalized anxiety disorder (Primary)           [facility] No Show Policy:  We understand unexpected circumstances arise; however, anytime you miss your appointment we are unable to provide you appropriate care.  In addition, each appointment missed could have been used to provide care for others.  We ask that you call at least 24 hours in advance to cancel or reschedule an appointment.  We would like to take this opportunity to remind you of our policy stating patients who miss THREE or more appointments without cancelling or rescheduling 24 hours in advance of the appointment may be subject to cancellation of any further visits with our clinic and recommendation to seek in-person services/visits.    Please call [phone] to reschedule your appointment. If there are reasons that make it difficult for you to keep the  appointments, please call and let us know how we can help.  Please understand that medication prescribing will not continue without seeing your provider.      [facility]'s No Show Policy reviewed with patient at today's visit. Patient verbalized understanding of this policy. Discussed with patient that in the event that there are three or more no show visits, it will be recommended that they pursue in-person services/visits as noncompliance with telehealth visits indicates that patient is not an appropriate candidate for telemedicine and would likely be more appropriate for in-person services/visits. Patient verbalizes understanding and is agreeable to this.        This document has been electronically signed by Sophia Vizcarra LCSW.  March 14, 2024 12:53 EDT      Part of this note may be an electronic transcription/translation of spoken language to printed text using the Dragon Dictation System.

## 2024-03-19 ENCOUNTER — HOSPITAL ENCOUNTER (OUTPATIENT)
Dept: NUCLEAR MEDICINE | Facility: HOSPITAL | Age: 64
Discharge: HOME | End: 2024-03-19
Payer: MEDICARE

## 2024-03-19 ENCOUNTER — HOSPITAL ENCOUNTER (OUTPATIENT)
Dept: CT IMAGING | Facility: HOSPITAL | Age: 64
Discharge: HOME | End: 2024-03-19
Admitting: INTERNAL MEDICINE
Payer: MEDICARE

## 2024-03-19 DIAGNOSIS — C50.811 MALIGNANT NEOPLASM OF OVERLAPPING SITES OF RIGHT BREAST IN FEMALE, ESTROGEN RECEPTOR NEGATIVE: ICD-10-CM

## 2024-03-19 DIAGNOSIS — C50.919 PRIMARY MALIGNANT NEOPLASM OF BREAST WITH METASTASIS: ICD-10-CM

## 2024-03-19 DIAGNOSIS — Z17.1 MALIGNANT NEOPLASM OF OVERLAPPING SITES OF RIGHT BREAST IN FEMALE, ESTROGEN RECEPTOR NEGATIVE: ICD-10-CM

## 2024-03-19 LAB — CREAT BLDA-MCNC: 1 MG/DL (ref 0.6–1.3)

## 2024-03-19 PROCEDURE — 71260 CT THORAX DX C+: CPT

## 2024-03-19 PROCEDURE — 25510000001 IOPAMIDOL 61 % SOLUTION: Performed by: INTERNAL MEDICINE

## 2024-03-19 PROCEDURE — 0 TECHNETIUM MEDRONATE KIT: Performed by: INTERNAL MEDICINE

## 2024-03-19 PROCEDURE — 78306 BONE IMAGING WHOLE BODY: CPT

## 2024-03-19 PROCEDURE — 0 DIATRIZOATE MEGLUMINE & SODIUM PER 1 ML: Performed by: INTERNAL MEDICINE

## 2024-03-19 PROCEDURE — A9503 TC99M MEDRONATE: HCPCS | Performed by: INTERNAL MEDICINE

## 2024-03-19 PROCEDURE — 74177 CT ABD & PELVIS W/CONTRAST: CPT

## 2024-03-19 PROCEDURE — 82565 ASSAY OF CREATININE: CPT

## 2024-03-19 RX ORDER — TC 99M MEDRONATE 20 MG/10ML
21 INJECTION, POWDER, LYOPHILIZED, FOR SOLUTION INTRAVENOUS
Status: COMPLETED | OUTPATIENT
Start: 2024-03-19 | End: 2024-03-19

## 2024-03-19 RX ADMIN — DIATRIZOATE MEGLUMINE AND DIATRIZOATE SODIUM 30 ML: 660; 100 LIQUID ORAL; RECTAL at 11:25

## 2024-03-19 RX ADMIN — Medication 21 MILLICURIE: at 11:20

## 2024-03-19 RX ADMIN — IOPAMIDOL 100 ML: 612 INJECTION, SOLUTION INTRAVENOUS at 13:21

## 2024-03-20 NOTE — PROGRESS NOTES
Chief Complaint  Previous Stage Ib ([id number]) ER/TN positive, HER-2/peter negative right breast cancer with subsequent metastatic disease identified 10/8/2017, history of right pulmonary embolism, cancer related pain, chemotherapy-induced diarrhea, chemotherapy-induced mucositis, chemotherapy-induced hand-foot syndrome    Subjective        History of Present Illness  The patient returns today in follow-up with laboratory studies, CT scans, bone scan to review continuing on oral Xeloda 1000 mg in the morning, 1500 mg in the evening for 7 days on followed by 7 days off as well as every 3-month Xgeva (due today) and Eliquis 5 mg twice daily.  The patient reports that overall she has done reasonably well recently.  She has been primarily dealing with issues related to her 's malignancy and significant medical issues.  He was recently hospitalized and is now at home on hospice care.  This has taken most of her attention.  She reports that she is tolerating Xeloda relatively well, no significant changes.  She does have significant hand-foot syndrome, worse in the hands and does continue to apply frequent emollient cream.  There is slight skin peeling and 1 area of cracked skin on her finger, overall stable.  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  She does have some mild chronic fatigue which is unchanged.  She was experiencing pain in her right knee and underwent a gel injection last week with improvement.  She continues to have difficulty chronically with her mobility, has Charcot-Saloni-Tooth disease, uses braces on her lower extremities and ambulates with the use of a cane.  She does have intermittent difficulty with pain in her shoulders, has been evaluated previously with MRI studies that have shown rotator cuff tendinitis and tears.  Symptoms have been exacerbated recently as well as some mild pain in her neck.  She has been  "using Percocet 5/325 more frequently recently, over the past few days has required this every 4 hours primarily related to her shoulder pain.  She notes that her bowels are moving fairly normally.  She does continue with frequent UTIs, received a course of nitrofurantoin in January and cephalexin in February however upon review of her urine cultures, results were negative on 1/30 and 2/13/2024.  She reports that [facility] and is scheduled to be seen there in a few weeks, had previously been seen by urogynecology.  She has developed recurrent dysuria and is requesting that we check a urinalysis and culture today.  She does have Pyridium to use as needed.      Objective   Vital Signs:   /63   Pulse 104   Temp 98.2 °F (36.8 °C) (Temporal)   Resp 18   Ht 154.9 cm (60.98\")   Wt 82.9 kg (182 lb 12.8 oz)   SpO2 97%   BMI 34.56 kg/m²     Physical Exam  Constitutional:       Appearance: She is well-developed.      Comments: Patient ambulates with the use of a cane   Eyes:      Conjunctiva/sclera: Conjunctivae normal.   Neck:      Thyroid: No thyromegaly.   Cardiovascular:      Rate and Rhythm: Normal rate and regular rhythm.      Heart sounds: No murmur heard.     No friction rub. No gallop.   Pulmonary:      Effort: No respiratory distress.      Breath sounds: Normal breath sounds.   Abdominal:      General: Bowel sounds are normal. There is no distension.      Palpations: Abdomen is soft.      Tenderness: There is no abdominal tenderness.   Musculoskeletal:      Comments: Braces in place in the bilateral lower extremities   Lymphadenopathy:      Head:      Right side of head: No submandibular adenopathy.      Cervical: No cervical adenopathy.      Upper Body:      Right upper body: No supraclavicular adenopathy.      Left upper body: No supraclavicular adenopathy.   Skin:     General: Skin is warm and dry.      Findings: Rash present.      Comments: Erythema involving the palms is " currently minimal.  The degree of skin peeling is mild and stable.  There is minimal erythema involving the dorsal aspect of the fingers.  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      Cranial Nerves: No cranial nerve deficit.      Motor: No abnormal muscle tone.      Deep Tendon Reflexes: Reflexes normal.   Psychiatric:         Behavior: Behavior normal.       Patient was examined today, unchanged from above    Result Review : Reviewed CBC, CMP, magnesium, phosphorus from today.  Reviewed CT chest abdomen pelvis and bone scan from 3/19/2024.     Assessment and Plan     *Previous Stage Ib ([id number]) right breast cancer:  Diagnosed May 2010 with excisional biopsy for invasive ductal carcinoma, 1.3 cm, grade 2, ER 90%, DE 80%, HER-2/peter negative (1+ IHC).    Subsequent right mastectomy in July 2010 with no residual breast malignancy, 1/5 sentinel lymph node with micrometastasis (0.25 mm).    Treated in the [facility] system with adjuvant AC ×4 cycles in 2010 (no taxanes administered due to underlying Charcot-Saloni-Tooth with peripheral neuropathy).    Adjuvant Femara (postmenopausal) initiated October 2010 with plan to treat ×10 years.    Genetic testing reportedly negative.    Developed osteopenia treated with Prolia beginning 2/27/13. Subsequently discontinued due to identification of metastatic disease.    *Recurrent/metastatic disease identified 10/8/17:  Disease involving thoracic spine with cord compression at T6, lumbosacral involvement, sternal and right sternoclavicular involvement.    Femara discontinued in 10/2017.    Radiation administered (in the [facility] system) to the thoracic spine beginning 10/19/17 treating T3-T9 to a dose of 24 gray in 6 fractions.  Evaluation with MRI 12/8/17 showing persistent T6 cord compression with persistent neurologic compromise requiring surgical treatment 12/11/17 with T6  laminectomy/corpectomy and T3-T9 fusion.  Pathology with metastatic carcinoma of breast origin, ER negative, RI negative, HER-2/peter negative (1+ IHC).  Additional staging evaluation 12/8/17 with no evidence of visceral metastatic disease, bone scan showing involvement of thoracic spine, sternum, left humerus, mid frontal bone.  No plane film correlate of left humerus lesion.  MRI lumbar spine with small intradural L3 metastasis.  CA 15-3 12/6/17- 17.  Palliative radiation therapy to L3 dural metastasis and left humerus initiated 1/15/18 (10 fractions), completed 1/26/18.  Hypercalcemia of malignancy with calcium in the 10-11 range.  Initiation of monthly Xgeva 1/23/18.  Baseline CT scan 1/30/18 with no evidence of visceral involvement.  Cluster of nodular opacities in the right lower lobe suspected to be infectious or related to bronchiolitis. Bone scan 1/30/18 showed postsurgical change in the thoracic spine, stable uptake in the frontal bone, no new areas of disease.  Initiation of palliative oral single agent Xeloda 2/7/18 2000 mg a.m., 1500 mg p.m. for 14/21 days.   Following 3 cycles xeloda, bone scan 4/4/18 showed no change from the prior study.  CT scan 4/4/18 showed a small pericardial effusion of unclear significance as well as a subcutaneous nodule in the right lateral chest wall.  Subsequent evaluation with echocardiogram 4/17/18 showed no evidence of pericardial effusion.  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  We discussed the possibility of surgical excision of the right subcutaneous chest wall lesion for more definitive diagnosis.  Reviewed previous CT images dating back to 12/8/17 and the lesion was present even at that time measuring around 1.7 cm although not commented on in the radiology report.  As this appears to be an indolent low-grade process  unrelated to her breast cancer, recommendee foregoing surgical excision at this time and monitoring this area on future scans.  The patient agreed.    Following 6 cycles of Xeloda, CT 6/6/18  showed stable findings, no evidence of progressive disease.  There was a comment regarding subcutaneous abnormality in the anterior abdominal wall and this was related to Lovenox injection sites.  Bone scan 6/6/18 showed no interval change.   CT scan and bone scan 8/13/18 following 9 cycles of Xeloda showed stable findings with no evidence of significant visceral metastases.  Her bone lesions appear stable on bone scan.  The spindle cell neoplasm in her right chest wall actually decreased in size from 2 cm down to 1.6 cm.    The patient experienced some symptoms of diarrhea, anorexia, generalized weakness during cycle 9 Xeloda it was unclear whether this was related to a viral gastroenteritis or toxicity from treatment.  Symptoms recurred during cycle 10 and treatment was cut short by 2 days.  Symptoms attributed to Xeloda.  With cycle 11, dose and schedule altered to 1500 mg twice daily for 7 days on followed by 7 days off .  CT scan 9/9/2020 with no significant changes.  Bone scan 9/9/2020 read as unchanged from prior studies however did note an area of slight activity in the medial left femur.  Contacted radiology and although this was not noted on prior reports appears to have been present.  Subsequent MRI left femur 9/21/2020 with cortical thickening and periosteal edema left iliac us muscle insertion to the medial left femur with no evidence of metastatic disease, favored to represent periosteal change secondary to insertional tendinitis.  Severe hand-foot symptoms causing sclerodactyly and limitation in finger movement prompted change in dosing in July 2021 with Xeloda decreased to 1000 mg a.m., 1500 mg p.m. for 7 days on followed by 7 days off.  Patient was experiencing severe hand-foot syndrome related to Xeloda having  developed skin peeling, sclerodactyly with limited use of her hands.  On 3/31/2022, Xeloda was held to allow for recovery.  Patient resumed Xeloda on 4/18/2022.  Patient required hospitalization 5/29 - 6/1/2022 with presumed viral gastroenteritis that was exacerbated by Xeloda.  Xeloda held briefly, resumed on 6/6/2022.  Patient again with worsening sclerodactyly, Xeloda held for 2 weeks from 10/3 - 10/17/2022, resumed at prior dose with improvement in symptoms.  Scans on 3/3/2023 with CT chest abdomen pelvis and bone scan showing stable findings.  CT chest abdomen pelvis 7/3/2023 with questionable 1 mm change in size right lower lobe nodule.  Additional small pulmonary nodules stable.  Stable bony metastatic disease.  Bone scan on 7/7/2023 however showed slight progression focal involvement right ischium and superior pubic ramus (new ischial lesion superior pubic ramus compared to 10/24/2022 and more conspicuous compared to 3/3/2023).  Metastatic lesion right inferior pubic ramus and ischial tuberosity increased in size since October 2022 however stable from 3/3/2023.  MRI cervical spine 7/3/2023 with no metastatic involvement however identification of the lesion at T2, recommended dedicated thoracic spine MRI to evaluate further.  Given the minimal extent and slow degree of progression, elected to continue oral Xeloda and evaluate further with MRI pelvis and thoracic spine.  Consideration of biopsy pelvic metastasis for repeat ER/[place]/HER2/peter testing.    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  CA 15-3 on 7/10/2023 was 12.2, uninformative.  MRI thoracic spine 7/27/2023 with 1 cm metastatic focus seen in L1  MRI pelvis 7/27/2023 with metastatic foci identified right superior pubic ramus, right ischial tuberosity, inferior pubic ramus, L5 body.  Chronic appearing posttraumatic and/or degenerative change in the posterior right ilium adjacent SI  joint.  CT-guided biopsy right pubic ramus lesion on 8/31/2023.  Pathology showed no evidence of malignancy, showed markedly calcified and sclerotic mature lamellar bone.  Attempted decalcification but no evidence of malignancy.  CT chest abdomen pelvis 9/8/2023 and bone scan 9/11/2023 with stable findings.  CT chest abdomen pelvis 12/8/2023 with possible slight increase in right chest wall subcutaneous lesion (1.8 x 1.1 up to 1.8 x 1.4 cm) although may be related to altered positioning.  Nonpathologically enlarged right axillary and subpectoral lymph nodes slightly increased (patient notes she received RSV vaccine right arm just prior to scan).  Bone scan 12/11/2023 with stable findings.  CT chest abdomen pelvis 3/19/2024 with slight interval decrease in size of right axillary and bilateral subpectoral lymph nodes, right subcutaneous soft tissue nodule slightly smaller, stable pulmonary nodules, stable bone metastases.  Bone scan 3/19/2024 with stable findings.  The patient returns today continuing on treatment with Xeloda 1000 mg a.m., 1500 mg p.m. 7 days on followed by 7 days off.  She also continues on monthly Xgeva due today.  The patient is tolerating treatment reasonably well, hand-foot syndrome is fairly stable.  She does have sclerodactyly related to the chronic changes in her hands however that is somewhat improved currently, does affect her dexterity to a mild degree however symptoms are tolerable  We reviewed her scans with CT chest abdomen pelvis 3/19/2024 as noted above showing slight decrease in the right axillary and subpectoral lymph nodes that were noted to have increased slightly on prior scan.  Likely these are reactive in nature, many of them are nonpathologic in their size.  Bone metastases and small pulmonary nodules stable.  Bone scan with stable findings.  We will therefore proceed on with treatment as planned.  She will return in 6 weeks for NP visit and labs and I will see her in 3 months  when she is due again for Xgeva and just prior to the visit we will again repeat CT scan and bone scan for review.  Patient is experiencing some dysuria again and we will recheck urinalysis today.  She will be seeing urology in the next few weeks regarding her recurrent UTIs.  She has experienced recurrence of the chronic intermittent pain in her shoulders and neck and we discussed referral back to physical therapy to assist with this issue in addition to her chronic issues with mobility from her Charcot-Saloni-Tooth disease.    *Right pulmonary embolism:  Diagnosed on CT angiogram 10/21/17 involving small right lower lobe pulmonary artery.  Lower extremity Dopplers negative.  Bilateral lower extremity Dopplers negative again 12/5/17.  Received chronic Lovenox 1 mg/kg twice per day, transitioned to oral Eliquis in February 2019, continuing on Eliquis 5 mg twice daily.  Patient continues on Eliquis 5 mg twice daily    *Cancer related pain:  Previously receiving Duragesic 50 µg patch every 72 hours along with Dilaudid 4 mg as needed for breakthrough pain  The patient's pain improved over time and she was able to discontinue both Duragesic and Dilaudid in the interval.  Patient does take occasional Flexeril at bedtime due to back spasm/pain when she has been more active.  The patient does have some occasional aggravation of her chronic back pain and does use tramadol 50 mg every 8 hours as needed  Patient was receiving tramadol 50 mg every 8-12 hours as needed in addition to hydrocodone 5/325 every 4 hours as needed.  She was also taking OTC NSAIDs.  Patient developed nausea related to hydrocodone and was transitioned to Percocet 5/325 every 4 hours as needed, advised to stop taking NSAIDs with ongoing anticoagulation.  Patient notes that she has been taking more Percocet recently primarily related to the flare in her bilateral shoulder and neck pain.  As above we are referring her to physical therapy.  Bone scan showed  no findings in this area.    *Chemotherapy-induced diarrhea with subsequent C. difficile colitis in the setting of previous ulcerative colitis and then identification of enteropathogenic E. coli:  Patient with reported history of ulcerative colitis, continues on mesalamine.  The patient developed initial diarrhea related to Xeloda at regular dosing.  Symptoms improved on reduced dose Xeloda  Flare of symptoms in October 2018 with apparent finding of C. difficile colitis by GI, treated with course of oral vancomycin with improvement in symptoms.  Patient notes minimal intermittent diarrhea/loose stools on Xeloda requiring occasional dosing of Imodium.    Patient required hospitalization 5/29 - 6/1/2022 with presumed viral gastroenteritis that was exacerbated by Xeloda.  Xeloda held briefly, resumed on 6/6/2022.  Patient's  developed C. difficile colitis and patient was experiencing increase in diarrhea.  Stool studies performed 8/4/22 negative C. difficile however GI PCR was positive for enteropathogenic E. coli.  Given patient's symptoms and immunocompromise status it was elected to treat her with azithromycin 500 mg daily x3 days beginning 8/5/2022  Diarrhea resolved  Patient underwent colonoscopy on 2/28/2023 with findings of diverticulosis  Patient notes that bowel function has been stable, does have some soft stools the week on treatment with Xeloda.    *Traumatic left tibia/fibular fracture:  Status post ORIF 12/6/17  Specimen was sent for pathologic review, negative for evidence of malignancy    *Hypercalcemia:  Suspect hypercalcemia of malignancy, calcium in  10-11 range previously.  Calcium normalized following initiation of monthly Xgeva on 1/23/18.    *Chemotherapy-induced mucositis:  Patient has not experienced any recent difficulty with mucositis.    *Recurrent UTI, bladder wall thickening on CT:  Patient had an enterococcal UTI on 3/2/18 sensitive to nitrofurantoin and received treatment, unclear  how long.  Recurrent UTI 3/20/18 with urine culture growing Klebsiella, initially treated with nitrofurantoin, transitioned to Levaquin.  CT 4/4/18 with diffuse bladder wall thickening with increased nodular thickening at the left base.  Referral to urogynecology Dr. May Johnson.  She was placed on a prophylactic dose of trimethoprim 100 mg daily, bladder wall thickening felt to be related to recent recurrent urinary tract infections.  Patient with urinary symptoms, treated with course of Macrobid at the end of December 2018, urine culture however was negative 12/31/18.  Patient was found to have Klebsiella UTI 7/29/2019 which was successfully treated with Macrobid with complete resolution of symptoms.  CT 8/10/2021 with diffuse bladder wall thickening new from 5/17/2021 (however seen on multiple prior scans).    UTI on 10/18/2021 with E. coli greater than 100,000 colonies that was pansensitive, treated with Macrobid x7 days  With ongoing/recurrent UTIs patient was seen by urogynecology and initiated suppressive therapy with trimethoprim 100 mg daily in December 2021.  She has not experienced any further urinary tract infections.  CT abdomen pelvis 3/28/2022 does show diffuse thickening of urinary bladder as has been seen on prior studies indicative of cystitis.  Patient notes that she did stop taking trimethoprim on a daily basis.    Patient with urine culture on 5/5/2023 that grew E. coli and she received antibiotic treatment from urogynecology.    Urine culture on 8/23/2023 with greater than 100,000 colonies of E. coli resistant to ampicillin and Bactrim.  Received 5-day course of Cipro with resolution of symptoms.  Patient did have UTI with E. coli on culture 10/2/2023, received a course of Augmentin.  Patient with ongoing intermittent UTIs although recent urine cultures were noted to be negative with PCP.  She is having dysuria today and we will check urinalysis and culture.  She is seeing urology within the  next few weeks for recommendations on further management.    *Charcot-Saloni-Tooth with mobility difficulties:  The patient underwent an intensive course of rehabilitation at [facility].  She graduated from her outpatient course November 2018.  Overall the patient has improved dramatically in terms of mobility,   Patient developed significant callus formation lateral aspect of the left plantar surface.  She did meet with her brace specialist and additional padding was added in this area.  She continues to follow-up with her podiatrist Dr. Ware.    *Depression:  The patient is continuing follow-up in the supportive oncology clinic and is currently continuing on Cymbalta 30 mg twice daily as well as gabapentin 600 mg nightly, temazepam 15 mg nightly as needed, Provigil 100 mg daily.  Patient does continue to attend support groups at [facility].  The patient does continue routine follow-up in supportive oncology clinic.    Patient does have multiple stressors with her 's malignancy and chemotherapy as well as her autistic son who is living with them at home.  Patient continues to have difficulty with stress and anxiety, has been followed by supportive oncology, last seen on 12/1/2023.  She is currently dealing with issues related to her 's advanced malignancy having just gone on to hospice care recently.  She appears to be coping reasonably well.    *Hand-foot syndrome secondary to Xeloda:  Patient continues with frequent application of emollient cream to the hands and feet  Symptoms increased significantly requiring a 1 week delay in cycle 18 Xeloda as noted above.  Symptoms did improve and she continued on the same dose.    Patient was referred to dermatology and has been continuing on triamcinolone 0.1% cream used for 1 week on followed by 1 week off which led to some further improvement.   Progressive palmar erythema with development of sclerodactyly and effect on dexterity.  Addition of urea-based  cream 3 times daily.  Patient with continued difficulty regarding erythema of her hands that extended onto the dorsal aspect and was causing contractures/sclerodactyly in her fingers affecting her dexterity.  In July 2021, held Xeloda for an additional week and then reduced dose from 1500 mg twice daily down to 1000 mg a.m. and 1500 mg p.m. and continued on a 7-day on followed by 7-day off schedule.  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  On 3/31/2022, Xeloda was held to allow for recovery.  Patient resumed Xeloda on 4/18/2022.  Patient developed worsening sclerodactyly affecting dexterity that required Xeloda to again be briefly held for 2 weeks from 10/3 - 10/17/2022.  Treatment resumed at prior dose after improvement in symptoms.  Patient continues to use emollient cream frequently (currently 5 times daily) and topical triamcinolone 0.1% twice daily intermittently (2 weeks on followed by 2 weeks off as instructed by dermatology).  The patient has minimal erythema on the palms, degree of scaling and peeling is minimal currently.  Sclerodactyly is stable.  Patient reports that symptoms are tolerable at this time.  As before, if symptoms worsen we will consider taking an additional week off of Xeloda in the future.    *Evidence of steatosis on scans with mild intermittent elevated liver function studies:  Liver function studies increased 8/20/19 with ALT 98, AST 70, normal total bilirubin.  Negative viral hepatitis A, B, and C panel 8/23/18  Likely related to hepatic steatosis.   Subsequent improvement in LFTs  LFTs today are normal    *Chemotherapy induced leukopenia:  WBC today 7.84 with normal differential    *GERD, question of celiac disease:  Patient with significant  history of reflux  Patient currently receiving Protonix 40 mg daily daily and Carafate 1 g 4 times daily as needed  Patient required hospitalization 5/29 - 6/1/2022 with presumed viral gastroenteritis that was exacerbated by Xeloda.    EGD performed 5/31/2022 during hospitalization with biopsy that showed focal blunting of villi and atrophy with slight increase in intraepithelial lymphocytes.  Changes were minimal but recommended correlation with serology to exclude celiac disease.  Celiac serology 8/8/2022 negative  Patient previously developed worsening reflux symptoms, temporarily increase Protonix to 40 mg twice daily and we did add Carafate 1 g 4 times daily.    She is taking Protonix 40 mg once daily, is not taking Carafate.    *Insomnia:  Patient with prior paradoxical reaction to Benadryl  Improved previously on temazepam 15 mg nightly as needed.   Patient noted subsequently that temazepam was having no effect.   Patient increased gabapentin to 900 mg nightly, discontinued temazepam    *Health maintenance:  Patient notes that she has a history of colon polyps as well as ulcerative colitis and was due for a follow-up colonoscopy on 9/12/2019.  We did discuss there is no necessity to pursue colonoscopy in the setting of her metastatic breast cancer.    The patient did undergo colonoscopy on 2/7/2020 with findings of muscular hypertrophy and diverticulosis.   Patient did wish to proceed with further colonoscopic evaluation, performed on 12/20/2022 with poor prep.  Repeat 2/28/2023 with diverticulosis.    *Bilateral shoulder pain:  Chronic difficulty with right shoulder although she has not complained of this in prior visits to our office.  She reported difficulty with abduction.    The patient did undergo MRI of her right shoulder on 11/21/2019 at an outside facility showing multiple abnormalities including supraspinatus tendinosis, labral tear but no evidence of metastatic disease.  Patient developed pain in  the left shoulder, was seen by orthopedics and underwent steroid injection with some improvement.  Right shoulder stable.  Patient did undergo physical therapy with some improvement.  She continues to use tramadol 50 mg every 8-12 hours as needed.  Note that current CT 10/20/2022 made notation of left shoulder probable bursitis.    Patient continues with bilateral shoulder pain, left greater than right which does wax and wane.  Recent flare of pain as outlined above, we will refer patient back to physical therapy.    *Ocular changes in part related to Xeloda:  Patient experienced a mild degree of blurred vision as well as burning and pruritus.  She was seen by her ophthalmologist and was placed on xiidra ophthalmic drops which have helped.  Likely both issues are to some extent related to Xeloda.  Symptoms did worsen and patient was seen by ophthalmologist at [facility].  She is now using an ocular lubricant at night in addition to artificial tears which have helped.  Symptoms currently stable to improved    *Elevated glucose:  It is noted the patient's glucose at the last few visits has been in the high 100 range, postprandial.  She has had a hemoglobin A1c that has been in the high 5-6 range in the past  Hemoglobin A1c was last checked on 12/15/2023 at 5.7    *Possible loosening of pedicle screw at T3:  CT cervical spine 5/17/2021 showed loosening of the left pedicle screw at T3.  Patient referred to orthopedics and was seen by Dr. Nayak who felt that there were no concerning findings on the scan    *Iron deficiency anemia  Labs on 5/2/2022 with hemoglobin 10.8.  Additional labs with iron 48, ferritin 21.2, iron saturation 11%, TIBC 4 and 32, folate greater than 20, B12 greater than 2000.    Attempted treatment with oral iron daily however patient was intolerant  Patient subsequently received Venofer 300 mg weekly x4 beginning 6/6/2022 and completed on 6/27/2022  Subsequent iron studies on 7/11/2022  showed improvement with iron 62, ferritin 345, iron saturation 18%, TIBC 336.  Hemoglobin had improved up to 12.7 at that time.  Repeat iron studies on 10/3/2022 showed iron replete status with hemoglobin 13.7, iron 121, ferritin 208.7, iron saturation 31%, TIBC 392.  Hemoglobin currently normal at 12.6     Plan:  Continue palliative single agent Xeloda 1000 mg a.m., 1500 mg in the p.m. 7 days on followed by 7 days off   Proceed with every 3-month Xgeva today  Continue Eliquis 5 mg twice daily  The patient will continue frequent use of emollient cream currently 5 times daily and continue periodic triamcinolone cream 0.1% twice daily (provided by dermatology) 2 weeks on followed by 2 weeks off as prescribed  Continue Protonix 40 mg daily  Continue ocular lubricating gel nightly per ophthalmology  Continue Provigil 100 mg daily and Cymbalta 30 mg twice daily.  Patient continues routine follow-up with supportive oncology   Patient will continue gabapentin 900 mg at night.   Patient continues on tramadol 50 mg every 8 hours as needed for pain  Continue Percocet 5/325 every 4 hours as needed for pain  We will make a new referral to her physical therapist in regards to addressing the recurrent bilateral shoulder and neck pain  Patient continues follow-up with supportive oncology  Urinalysis and culture today.  Patient will follow-up as scheduled with first urology in 2 weeks  In 6 weeks NP visit with CBC, CMP  MD visit in 3 months with CBC, CMP, magnesium, phosphorus.  Patient will be due for Xgeva.  1 week prior to visit CT chest abdomen pelvis and bone scan.     Patient continuing on high risk medication requiring intensive monitoring.       I did spend 40 minutes in total time caring for the patient today, time spent in review of records, face-to-face consultation, coordination of care, placement of orders, completion of documentation.

## 2024-03-21 ENCOUNTER — INFUSION (OUTPATIENT)
Dept: ONCOLOGY | Facility: HOSPITAL | Age: 64
End: 2024-03-21
Payer: MEDICARE

## 2024-03-21 ENCOUNTER — LAB (OUTPATIENT)
Dept: LAB | Facility: HOSPITAL | Age: 64
End: 2024-03-21
Payer: MEDICARE

## 2024-03-21 ENCOUNTER — OFFICE VISIT (OUTPATIENT)
Dept: ONCOLOGY | Facility: CLINIC | Age: 64
End: 2024-03-21
Payer: MEDICARE

## 2024-03-21 VITALS
SYSTOLIC BLOOD PRESSURE: 110 MMHG | WEIGHT: 182.8 LBS | OXYGEN SATURATION: 97 % | HEIGHT: 61 IN | DIASTOLIC BLOOD PRESSURE: 63 MMHG | TEMPERATURE: 98.2 F | BODY MASS INDEX: 34.51 KG/M2 | RESPIRATION RATE: 18 BRPM | HEART RATE: 104 BPM

## 2024-03-21 DIAGNOSIS — Z17.1 MALIGNANT NEOPLASM OF OVERLAPPING SITES OF RIGHT BREAST IN FEMALE, ESTROGEN RECEPTOR NEGATIVE: ICD-10-CM

## 2024-03-21 DIAGNOSIS — M25.519 SHOULDER PAIN, UNSPECIFIED CHRONICITY, UNSPECIFIED LATERALITY: ICD-10-CM

## 2024-03-21 DIAGNOSIS — C50.811 MALIGNANT NEOPLASM OF OVERLAPPING SITES OF RIGHT BREAST IN FEMALE, ESTROGEN RECEPTOR NEGATIVE: Primary | ICD-10-CM

## 2024-03-21 DIAGNOSIS — M54.2 NECK PAIN: ICD-10-CM

## 2024-03-21 DIAGNOSIS — Z17.1 MALIGNANT NEOPLASM OF OVERLAPPING SITES OF RIGHT BREAST IN FEMALE, ESTROGEN RECEPTOR NEGATIVE: Primary | ICD-10-CM

## 2024-03-21 DIAGNOSIS — M79.603 PAIN OF UPPER EXTREMITY, UNSPECIFIED LATERALITY: ICD-10-CM

## 2024-03-21 DIAGNOSIS — R30.0 DYSURIA: ICD-10-CM

## 2024-03-21 DIAGNOSIS — C50.811 MALIGNANT NEOPLASM OF OVERLAPPING SITES OF RIGHT BREAST IN FEMALE, ESTROGEN RECEPTOR NEGATIVE: ICD-10-CM

## 2024-03-21 LAB
ALBUMIN SERPL-MCNC: 3.7 G/DL (ref 3.5–5.2)
ALBUMIN/GLOB SERPL: 1.2 G/DL
ALP SERPL-CCNC: 62 U/L (ref 39–117)
ALT SERPL W P-5'-P-CCNC: 9 U/L (ref 1–33)
ANION GAP SERPL CALCULATED.3IONS-SCNC: 10.7 MMOL/L (ref 5–15)
AST SERPL-CCNC: 26 U/L (ref 1–32)
BACTERIA UR QL AUTO: ABNORMAL /HPF
BASOPHILS # BLD AUTO: 0.04 10*3/MM3 (ref 0–0.2)
BASOPHILS NFR BLD AUTO: 0.5 % (ref 0–1.5)
BILIRUB SERPL-MCNC: 0.7 MG/DL (ref 0–1.2)
BILIRUB UR QL STRIP: NEGATIVE
BUN SERPL-MCNC: 16 MG/DL (ref 8–23)
BUN/CREAT SERPL: 15.5 (ref 7–25)
CALCIUM SPEC-SCNC: 9.3 MG/DL (ref 8.6–10.5)
CHLORIDE SERPL-SCNC: 102 MMOL/L (ref 98–107)
CLARITY UR: ABNORMAL
CO2 SERPL-SCNC: 28.3 MMOL/L (ref 22–29)
COLOR UR: ABNORMAL
CREAT SERPL-MCNC: 1.03 MG/DL (ref 0.57–1)
DEPRECATED RDW RBC AUTO: 56.4 FL (ref 37–54)
EGFRCR SERPLBLD CKD-EPI 2021: 61.2 ML/MIN/1.73
EOSINOPHIL # BLD AUTO: 0.07 10*3/MM3 (ref 0–0.4)
EOSINOPHIL NFR BLD AUTO: 0.9 % (ref 0.3–6.2)
ERYTHROCYTE [DISTWIDTH] IN BLOOD BY AUTOMATED COUNT: 15.9 % (ref 12.3–15.4)
GLOBULIN UR ELPH-MCNC: 3.1 GM/DL
GLUCOSE SERPL-MCNC: 141 MG/DL (ref 65–99)
GLUCOSE UR STRIP-MCNC: NEGATIVE MG/DL
HCT VFR BLD AUTO: 38 % (ref 34–46.6)
HGB BLD-MCNC: 12.6 G/DL (ref 12–15.9)
HGB UR QL STRIP.AUTO: ABNORMAL
IMM GRANULOCYTES # BLD AUTO: 0.02 10*3/MM3 (ref 0–0.05)
IMM GRANULOCYTES NFR BLD AUTO: 0.3 % (ref 0–0.5)
KETONES UR QL STRIP: NEGATIVE
LEUKOCYTE ESTERASE UR QL STRIP.AUTO: ABNORMAL
LYMPHOCYTES # BLD AUTO: 0.78 10*3/MM3 (ref 0.7–3.1)
LYMPHOCYTES NFR BLD AUTO: 9.9 % (ref 19.6–45.3)
MAGNESIUM SERPL-MCNC: 2 MG/DL (ref 1.6–2.4)
MCH RBC QN AUTO: 32.9 PG (ref 26.6–33)
MCHC RBC AUTO-ENTMCNC: 33.2 G/DL (ref 31.5–35.7)
MCV RBC AUTO: 99.2 FL (ref 79–97)
MONOCYTES # BLD AUTO: 0.53 10*3/MM3 (ref 0.1–0.9)
MONOCYTES NFR BLD AUTO: 6.8 % (ref 5–12)
NEUTROPHILS NFR BLD AUTO: 6.4 10*3/MM3 (ref 1.7–7)
NEUTROPHILS NFR BLD AUTO: 81.6 % (ref 42.7–76)
NITRITE UR QL STRIP: POSITIVE
NRBC BLD AUTO-RTO: 0 /100 WBC (ref 0–0.2)
PH UR STRIP.AUTO: 5.5 [PH] (ref 4.5–8)
PHOSPHATE SERPL-MCNC: 3 MG/DL (ref 2.5–4.5)
PLATELET # BLD AUTO: 227 10*3/MM3 (ref 140–450)
PMV BLD AUTO: 10.4 FL (ref 6–12)
POTASSIUM SERPL-SCNC: 4 MMOL/L (ref 3.5–5.2)
PROT SERPL-MCNC: 6.8 G/DL (ref 6–8.5)
PROT UR QL STRIP: ABNORMAL
RBC # BLD AUTO: 3.83 10*6/MM3 (ref 3.77–5.28)
RBC # UR STRIP: ABNORMAL /HPF
REF LAB TEST METHOD: ABNORMAL
SODIUM SERPL-SCNC: 141 MMOL/L (ref 136–145)
SP GR UR STRIP: 1.02 (ref 1–1.03)
SQUAMOUS #/AREA URNS HPF: ABNORMAL /HPF
UROBILINOGEN UR QL STRIP: ABNORMAL
WBC # UR STRIP: ABNORMAL /HPF
WBC CLUMPS # UR AUTO: ABNORMAL /HPF
WBC NRBC COR # BLD AUTO: 7.84 10*3/MM3 (ref 3.4–10.8)

## 2024-03-21 PROCEDURE — 25010000002 DENOSUMAB 120 MG/1.7ML SOLUTION: Performed by: INTERNAL MEDICINE

## 2024-03-21 PROCEDURE — 80053 COMPREHEN METABOLIC PANEL: CPT

## 2024-03-21 PROCEDURE — 84100 ASSAY OF PHOSPHORUS: CPT

## 2024-03-21 PROCEDURE — 87086 URINE CULTURE/COLONY COUNT: CPT | Performed by: INTERNAL MEDICINE

## 2024-03-21 PROCEDURE — 85025 COMPLETE CBC W/AUTO DIFF WBC: CPT

## 2024-03-21 PROCEDURE — 87077 CULTURE AEROBIC IDENTIFY: CPT | Performed by: INTERNAL MEDICINE

## 2024-03-21 PROCEDURE — 96372 THER/PROPH/DIAG INJ SC/IM: CPT

## 2024-03-21 PROCEDURE — 83735 ASSAY OF MAGNESIUM: CPT

## 2024-03-21 PROCEDURE — 36415 COLL VENOUS BLD VENIPUNCTURE: CPT

## 2024-03-21 PROCEDURE — 87186 SC STD MICRODIL/AGAR DIL: CPT | Performed by: INTERNAL MEDICINE

## 2024-03-21 PROCEDURE — 81001 URINALYSIS AUTO W/SCOPE: CPT | Performed by: INTERNAL MEDICINE

## 2024-03-21 RX ORDER — NITROFURANTOIN 25; 75 MG/1; MG/1
100 CAPSULE ORAL 2 TIMES DAILY
Qty: 14 CAPSULE | Refills: 0 | Status: SHIPPED | OUTPATIENT
Start: 2024-03-21 | End: 2024-03-28

## 2024-03-21 RX ADMIN — DENOSUMAB 120 MG: 120 INJECTION SUBCUTANEOUS at 12:48

## 2024-03-22 ENCOUNTER — SPECIALTY PHARMACY (OUTPATIENT)
Dept: PHARMACY | Facility: HOSPITAL | Age: 64
End: 2024-03-22
Payer: MEDICARE

## 2024-03-23 LAB — BACTERIA SPEC AEROBE CULT: ABNORMAL

## 2024-03-28 ENCOUNTER — APPOINTMENT (OUTPATIENT)
Dept: NUCLEAR MEDICINE | Facility: HOSPITAL | Age: 64
End: 2024-03-28
Payer: MEDICARE

## 2024-04-04 ENCOUNTER — TELEMEDICINE (OUTPATIENT)
Dept: PSYCHIATRY | Facility: CLINIC | Age: 64
End: 2024-04-04
Payer: MEDICARE

## 2024-04-04 DIAGNOSIS — F33.1 MAJOR DEPRESSIVE DISORDER, RECURRENT EPISODE, MODERATE: ICD-10-CM

## 2024-04-04 DIAGNOSIS — F41.1 GENERALIZED ANXIETY DISORDER: Primary | ICD-10-CM

## 2024-04-04 NOTE — PROGRESS NOTES
Date: 2024  Time In: 1300  Time Out: 1337  This provider is located at home address for [facility] ([facility] [address], [place] [address] using a secure [employer] Video Visit through [employer]. Patient is being seen remotely via telehealth at home address in [place] and stated they are in a secure environment for this session. The patient's condition being diagnosed/treated is appropriate for telemedicine. The provider identified herself as well as her credentials. The patient, and/or patients guardian, consent to be seen remotely, and when consent is given they understand that the consent allows for patient identifiable information to be sent to a third party as needed. They may refuse to be seen remotely at any time. The electronic data is encrypted and password protected, and the patient and/or guardian has been advised of the potential risks to privacy not withstanding such measures.     You have chosen to receive care through a telehealth visit.  Do you consent to use a video/audio connection for your medical care today? Yes    PROGRESS NOTE  Data:  Martha Davey is a 63 y.o. female who presents today for follow up    Chief Complaint: overwhelmed     History of Present Illness: Pt discussed 's health and behaviors. Pt reports that it is scary to think about what is going to happen once her  passes away. Pt reports that there are certain tasks that need to be completed that will cause her  to feel upset; such as  arrangements but hopes this can be addressed with 's therapist. Pt reports that she has experienced loss throughout her life but this is something that she has never felt before. Pt also reports other tasks that needs to be completed but for some reason can not bring herself to complete them due to having too many tasks to do.       Clinical Maneuvering/Intervention:    (Scales based on 0 - 10  with 10 being the worst)  Depression: 8 Anxiety: 8       Assisted patient in processing above session content; acknowledged and normalized patient’s thoughts, feelings, and concerns.  Rationalized patient thought process regarding recent stressors and life events. Discussed triggers associated with patient's emotions. Also discussed coping skills for patient to implement. Discussed amrik, smart goals, and being present within each day also normalized emotions.     Allowed patient to freely discuss issues without interruption or judgment. Provided safe, confidential environment to facilitate the development of positive therapeutic relationship and encourage open, honest communication. Assisted patient in identifying risk factors which would indicate the need for higher level of care including thoughts to harm self or others and/or self-harming behavior and encouraged patient to contact this office, call 911, or present to the nearest emergency room should any of these events occur. Discussed crisis intervention services and means to access. Patient adamantly and convincingly denies current suicidal or homicidal ideation or perceptual disturbance.    Assessment:   Assessment   Patient appears to maintain relative stability as compared to their baseline.  However, patient continues to struggle with depression which continues to cause impairment in important areas of functioning.  A result, they can be reasonably expected to continue to benefit from treatment and would likely be at increased risk for decompensation otherwise.    Mental Status Exam:   Hygiene:   good  Cooperation:  Cooperative  Eye Contact:  Good  Psychomotor Behavior:  Appropriate  Affect:  Appropriate  Mood: anxious  Speech:  Normal  Thought Process:  Linear  Thought Content:  Mood congruent  Suicidal:  None  Homicidal:  None  Hallucinations:  None  Delusion:  None  Memory:  Intact  Orientation:  Person, Place, Time and Situation  Reliability:   fair  Insight:  Fair  Judgement:  Fair  Impulse Control:  Fair  Physical/Medical Issues:  No        Patient's Support Network Includes:      Functional Status: Mild impairment     Progress toward goal: Not at goal    Prognosis: Fair with Ongoing Treatment            Plan:    Patient will continue in individual outpatient therapy with focus on improved functioning and coping skills, maintaining stability, and avoiding decompensation and the need for higher level of care.    Patient will adhere to medication regimen as prescribed and report any side effects. Patient will contact this office, call 911 or present to the nearest emergency room should suicidal or homicidal ideations occur. Provide Cognitive Behavioral Therapy and Solution Focused Therapy to improve functioning, maintain stability, and avoid decompensation and the need for higher level of care.     Return in about 3 weeks, or earlier if symptoms worsen or fail to improve.           VISIT DIAGNOSIS:     ICD-10-CM ICD-9-CM   1. Generalized anxiety disorder  F41.1 300.02   2. Major depressive disorder, recurrent episode, moderate  F33.1 296.32        Diagnoses and all orders for this visit:    1. Generalized anxiety disorder (Primary)    2. Major depressive disorder, recurrent episode, moderate           [facility] No Show Policy:  We understand unexpected circumstances arise; however, anytime you miss your appointment we are unable to provide you appropriate care.  In addition, each appointment missed could have been used to provide care for others.  We ask that you call at least 24 hours in advance to cancel or reschedule an appointment.  We would like to take this opportunity to remind you of our policy stating patients who miss THREE or more appointments without cancelling or rescheduling 24 hours in advance of the appointment may be subject to cancellation of any further visits with our clinic and recommendation to seek  in-person services/visits.    Please call [phone] to reschedule your appointment. If there are reasons that make it difficult for you to keep the appointments, please call and let us know how we can help.  Please understand that medication prescribing will not continue without seeing your provider.      [facility]'s No Show Policy reviewed with patient at today's visit. Patient verbalized understanding of this policy. Discussed with patient that in the event that there are three or more no show visits, it will be recommended that they pursue in-person services/visits as noncompliance with telehealth visits indicates that patient is not an appropriate candidate for telemedicine and would likely be more appropriate for in-person services/visits. Patient verbalizes understanding and is agreeable to this.        This document has been electronically signed by Sophia Vizcarra LCSW.  April 4, 2024 13:46 EDT      Part of this note may be an electronic transcription/translation of spoken language to printed text using the Dragon Dictation System.

## 2024-04-16 ENCOUNTER — TELEMEDICINE (OUTPATIENT)
Dept: PSYCHIATRY | Facility: HOSPITAL | Age: 64
End: 2024-04-16
Payer: MEDICARE

## 2024-04-16 DIAGNOSIS — F33.1 MAJOR DEPRESSIVE DISORDER, RECURRENT EPISODE, MODERATE: ICD-10-CM

## 2024-04-16 DIAGNOSIS — R53.0 NEOPLASTIC MALIGNANT RELATED FATIGUE: ICD-10-CM

## 2024-04-16 RX ORDER — MODAFINIL 200 MG/1
200 TABLET ORAL DAILY
Qty: 30 TABLET | Refills: 2 | Status: SHIPPED | OUTPATIENT
Start: 2024-04-16

## 2024-04-16 NOTE — PROGRESS NOTES
Behavioral Oncology Services  Video visit conducted via [employer] Video Visit  You have chosen to receive care through a telehealth visit.  Do you consent to use a video/audio connection for your medical care today? YES  Telehealth provided in patient's home.  Location of Provider: [place], [place]     Subjective  Patient ID: Martha Davey is a 63 y.o. female is seen via telehealth in the Behavioral Oncology Clinic.    CC: Grief, depression    HPI/ Interval History:   Pt is seen in follow up regarding continued impact of depression, fatigue, survivorship of breast cancer, progressive grief surrounding decline of  to metastatic cancer in Hospice care.    Pt reports distress related to husbands continued declining, falling, increasingly more sedated, disengaged. Appreciates connection to Hospice, involvement of sons in current situation. Endorses intermittent tearfulness, trying to keep it together. Recognizes unique needs of middle son. Continues to struggle with complex family dynamics. Struggles to accept one way street regarding support and communication.    Pt reports continued eating, sleeping, gratitude for regular connection from support community. Feels mood remains stable on regimen of duloxetine and modafinil. Is riding stationary bike, recognizing importance of caring for herself during this time, seeing herself as necessary leader. May get outside and garden a little today. Continues to maintain necessary personal appointments and medications. Sleep is complicated by concern for spouse, keeping light on, etc. Appreciates support of  and others. Recognizing scariness of 's passing, seeing importance of meeting with  homes, very aware of rapid decline. Denies specific needs, although is grateful for continued support.     Exam: As above    Recent Labs Reviewed:  Office Visit on 2024   Component Date Value    Urine Culture 2024 >100,000 CFU/mL Escherichia coli (A)      Color, UA 03/21/2024 Sophia (A)     Appearance, UA 03/21/2024 Cloudy (A)     pH, UA 03/21/2024 5.5     Specific Gravity, UA 03/21/2024 1.020     Glucose, UA 03/21/2024 Negative     Ketones, UA 03/21/2024 Negative     Bilirubin, UA 03/21/2024 Negative     Blood, UA 03/21/2024 Moderate (2+) (A)     Protein, UA 03/21/2024 >=300 mg/dL (3+) (A)     Leuk Esterase, UA 03/21/2024 Large (3+) (A)     Nitrite, UA 03/21/2024 Positive (A)     Urobilinogen, UA 03/21/2024 0.2 E.U./dL     RBC, UA 03/21/2024 6-10 (A)     WBC, UA 03/21/2024 21-50 (A)     Bacteria, UA 03/21/2024 3+ (A)     Squamous Epithelial Cell* 03/21/2024 0-3     WBC Clumps, UA 03/21/2024 Moderate/2+     Methodology 03/21/2024 Manual Light Microscopy    Lab on 03/21/2024   Component Date Value    Glucose 03/21/2024 141 (H)     BUN 03/21/2024 16     Creatinine 03/21/2024 1.03 (H)     Sodium 03/21/2024 141     Potassium 03/21/2024 4.0     Chloride 03/21/2024 102     CO2 03/21/2024 28.3     Calcium 03/21/2024 9.3     Total Protein 03/21/2024 6.8     Albumin 03/21/2024 3.7     ALT (SGPT) 03/21/2024 9     AST (SGOT) 03/21/2024 26     Alkaline Phosphatase 03/21/2024 62     Total Bilirubin 03/21/2024 0.7     Globulin 03/21/2024 3.1     A/G Ratio 03/21/2024 1.2     BUN/Creatinine Ratio 03/21/2024 15.5     Anion Gap 03/21/2024 10.7     eGFR 03/21/2024 61.2     Magnesium 03/21/2024 2.0     Phosphorus 03/21/2024 3.0     WBC 03/21/2024 7.84     RBC 03/21/2024 3.83     Hemoglobin 03/21/2024 12.6     Hematocrit 03/21/2024 38.0     MCV 03/21/2024 99.2 (H)     MCH 03/21/2024 32.9     MCHC 03/21/2024 33.2     RDW 03/21/2024 15.9 (H)     RDW-SD 03/21/2024 56.4 (H)     MPV 03/21/2024 10.4     Platelets 03/21/2024 227     Neutrophil % 03/21/2024 81.6 (H)     Lymphocyte % 03/21/2024 9.9 (L)     Monocyte % 03/21/2024 6.8     Eosinophil % 03/21/2024 0.9     Basophil % 03/21/2024 0.5     Immature Grans % 03/21/2024 0.3     Neutrophils, Absolute 03/21/2024 6.40     Lymphocytes,  Absolute 03/21/2024 0.78     Monocytes, Absolute 03/21/2024 0.53     Eosinophils, Absolute 03/21/2024 0.07     Basophils, Absolute 03/21/2024 0.04     Immature Grans, Absolute 03/21/2024 0.02     nRBC 03/21/2024 0.0    Hospital Outpatient Visit on 03/19/2024   Component Date Value    Creatinine 03/19/2024 1.00    Labs reviewed    Current Psychotropic Medications:  Duloxetine 30 mg bid  Modafinil 200 mg daily    Plan of Care/ Medical Decision Making  Continue medications as written.  Support regular communication with Hospice regarding needs of , self, and immediate family.  Grief counseling provided, supporting pt in care for self amidst current demands.  FU scheduled in 2 weeks.    Diagnoses and all orders for this visit:    1. Major depressive disorder, recurrent episode, moderate  -     modafinil (PROVIGIL) 200 MG tablet; Take 1 tablet by mouth Daily.  Dispense: 30 tablet; Refill: 2    2. Neoplastic malignant related fatigue  -     modafinil (PROVIGIL) 200 MG tablet; Take 1 tablet by mouth Daily.  Dispense: 30 tablet; Refill: 2    I spent 35 minutes caring for Martha on this date of service. This time includes time spent by me in the following activities: preparing for the visit, reviewing tests, obtaining and/or reviewing a separately obtained history, performing a medically appropriate examination and/or evaluation, counseling and educating the patient/family/caregiver, ordering medications, tests, or procedures, and documenting information in the medical record.

## 2024-04-18 NOTE — PROGRESS NOTES
Date: April 18, 2024  Time In: 1300  Time Out: 1332  This provider is located at home address for [facility] (through [facility]), [address], [place] [address] using a secure [employer] Video Visit through [employer]. Patient is being seen remotely via telehealth at home address in [place] and stated they are in a secure environment for this session. The patient's condition being diagnosed/treated is appropriate for telemedicine. The provider identified herself as well as her credentials. The patient, and/or patients guardian, consent to be seen remotely, and when consent is given they understand that the consent allows for patient identifiable information to be sent to a third party as needed. They may refuse to be seen remotely at any time. The electronic data is encrypted and password protected, and the patient and/or guardian has been advised of the potential risks to privacy not withstanding such measures.     You have chosen to receive care through a telehealth visit.  Do you consent to use a video/audio connection for your medical care today? Yes    PROGRESS NOTE  Data:  Martha Davey is a 63 y.o. female who presents today for follow up    Chief Complaint:     History of Present Illness: Pt discussed increased stress due to 's health. Pt also discussed assisting sons and feeling as if she is carrying all of the weight of the family not to mention mother's declining health and worrying about her. Pt reports that she is only one person and is very limited as to the amount of tasks she can get done but feels very uneasy when tasks are not completed and she is the only individual that can complete them.     Clinical Maneuvering/Intervention:    (Scales based on 0 - 10 with 10 being the worst)  Depression: 5 Anxiety: 6       Assisted patient in processing above session content; acknowledged and normalized patient’s thoughts, feelings, and concerns.   Rationalized patient thought process regarding recent stressors and life events. Discussed triggers associated with patient's emotions. Also discussed coping skills for patient to implement. Discussed limitations, expectations, and mindfulness.     Allowed patient to freely discuss issues without interruption or judgment. Provided safe, confidential environment to facilitate the development of positive therapeutic relationship and encourage open, honest communication. Assisted patient in identifying risk factors which would indicate the need for higher level of care including thoughts to harm self or others and/or self-harming behavior and encouraged patient to contact this office, call 911, or present to the nearest emergency room should any of these events occur. Discussed crisis intervention services and means to access. Patient adamantly and convincingly denies current suicidal or homicidal ideation or perceptual disturbance.    Assessment:   Assessment   Patient appears to maintain relative stability as compared to their baseline.  However, patient continues to struggle with anxiety which continues to cause impairment in important areas of functioning.  A result, they can be reasonably expected to continue to benefit from treatment and would likely be at increased risk for decompensation otherwise.    Mental Status Exam:   Hygiene:   good  Cooperation:  Cooperative  Eye Contact:  Good  Psychomotor Behavior:  Appropriate  Affect:  Full range  Mood: depressed  Speech:  Normal  Thought Process:  Linear  Thought Content:  Mood congruent  Suicidal:  None  Homicidal:  None  Hallucinations:  None  Delusion:  None  Memory:  Intact  Orientation:  Person, Place, Time and Situation  Reliability:  fair  Insight:  Fair  Judgement:  Fair  Impulse Control:  Fair  Physical/Medical Issues:  No        Patient's Support Network Includes:      Functional Status: Mild impairment     Progress toward goal: Not at  goal    Prognosis: Fair with Ongoing Treatment            Plan:    Patient will continue in individual outpatient therapy with focus on improved functioning and coping skills, maintaining stability, and avoiding decompensation and the need for higher level of care.    Patient will adhere to medication regimen as prescribed and report any side effects. Patient will contact this office, call 911 or present to the nearest emergency room should suicidal or homicidal ideations occur. Provide Cognitive Behavioral Therapy and Solution Focused Therapy to improve functioning, maintain stability, and avoid decompensation and the need for higher level of care.     Return in about 4 weeks, or earlier if symptoms worsen or fail to improve.           VISIT DIAGNOSIS:     ICD-10-CM ICD-9-CM   1. Generalized anxiety disorder  F41.1 300.02        Diagnoses and all orders for this visit:    1. Generalized anxiety disorder (Primary)           [facility] No Show Policy:  We understand unexpected circumstances arise; however, anytime you miss your appointment we are unable to provide you appropriate care.  In addition, each appointment missed could have been used to provide care for others.  We ask that you call at least 24 hours in advance to cancel or reschedule an appointment.  We would like to take this opportunity to remind you of our policy stating patients who miss THREE or more appointments without cancelling or rescheduling 24 hours in advance of the appointment may be subject to cancellation of any further visits with our clinic and recommendation to seek in-person services/visits.    Please call [phone] to reschedule your appointment. If there are reasons that make it difficult for you to keep the appointments, please call and let us know how we can help.  Please understand that medication prescribing will not continue without seeing your provider.      [facility]'s No Show  Policy reviewed with patient at today's visit. Patient verbalized understanding of this policy. Discussed with patient that in the event that there are three or more no show visits, it will be recommended that they pursue in-person services/visits as noncompliance with telehealth visits indicates that patient is not an appropriate candidate for telemedicine and would likely be more appropriate for in-person services/visits. Patient verbalizes understanding and is agreeable to this.        This document has been electronically signed by Sophia Vizcarra LCSW.  April 18, 2024 14:55 EDT      Part of this note may be an electronic transcription/translation of spoken language to printed text using the Dragon Dictation System.

## 2024-04-22 DIAGNOSIS — G89.3 CANCER ASSOCIATED PAIN: ICD-10-CM

## 2024-04-22 RX ORDER — TRAMADOL HYDROCHLORIDE 50 MG/1
50 TABLET ORAL EVERY 8 HOURS PRN
Qty: 90 TABLET | Refills: 0 | Status: SHIPPED | OUTPATIENT
Start: 2024-04-22

## 2024-04-22 NOTE — TELEPHONE ENCOUNTER
Caller: Martha Davey    Relationship: Self    Best call back number: [phone]     Requested Prescriptions:   Requested Prescriptions     Pending Prescriptions Disp Refills    traMADol (ULTRAM) 50 MG tablet 90 tablet 0     Sig: Take 1 tablet by mouth Every 8 (Eight) Hours As Needed for Moderate Pain.        Pharmacy where request should be sent: [facility] - [place] [address] [facility] [phone] [facility] [phone]      Last office visit with prescribing clinician: 3/21/2024   Last telemedicine visit with prescribing clinician: Visit date not found   Next office visit with prescribing clinician: 6/17/2024     Additional details provided by patient:     Does the patient have less than a 3 day supply:  [x] Yes  [] No    Would you like a call back once the refill request has been completed: [] Yes [x] No    If the office needs to give you a call back, can they leave a voicemail: [] Yes [x] No     no fever and no chills.

## 2024-05-02 ENCOUNTER — TELEMEDICINE (OUTPATIENT)
Dept: PSYCHIATRY | Facility: HOSPITAL | Age: 64
End: 2024-05-02
Payer: MEDICARE

## 2024-05-02 ENCOUNTER — TELEPHONE (OUTPATIENT)
Dept: INTERNAL MEDICINE | Facility: CLINIC | Age: 64
End: 2024-05-02
Payer: MEDICARE

## 2024-05-02 DIAGNOSIS — F33.1 MAJOR DEPRESSIVE DISORDER, RECURRENT EPISODE, MODERATE: Primary | ICD-10-CM

## 2024-05-02 DIAGNOSIS — F43.21 GRIEF: ICD-10-CM

## 2024-05-02 DIAGNOSIS — C50.919 PRIMARY MALIGNANT NEOPLASM OF BREAST WITH METASTASIS: ICD-10-CM

## 2024-05-02 DIAGNOSIS — F43.10 POST TRAUMATIC STRESS DISORDER (PTSD): ICD-10-CM

## 2024-05-02 DIAGNOSIS — R53.0 NEOPLASTIC MALIGNANT RELATED FATIGUE: ICD-10-CM

## 2024-05-02 NOTE — TELEPHONE ENCOUNTER
Patient called and stated that she is having back pain and burning urination. Advised patient that we have no available appointments this week with any provider and to go to urgent care. She asked if she could drop a urine by and advised you have to be seen. She said she would like to speak with Sierra. She said she recently lost a family member and under these circumstances there has to be a solution.     Best call back # [phone]

## 2024-05-06 ENCOUNTER — OFFICE VISIT (OUTPATIENT)
Dept: ONCOLOGY | Facility: CLINIC | Age: 64
End: 2024-05-06
Payer: MEDICARE

## 2024-05-06 ENCOUNTER — LAB (OUTPATIENT)
Dept: LAB | Facility: HOSPITAL | Age: 64
End: 2024-05-06
Payer: MEDICARE

## 2024-05-06 VITALS
SYSTOLIC BLOOD PRESSURE: 132 MMHG | TEMPERATURE: 97.8 F | RESPIRATION RATE: 16 BRPM | OXYGEN SATURATION: 95 % | HEART RATE: 96 BPM | BODY MASS INDEX: 34.81 KG/M2 | DIASTOLIC BLOOD PRESSURE: 80 MMHG | HEIGHT: 61 IN | WEIGHT: 184.4 LBS

## 2024-05-06 DIAGNOSIS — Z17.1 MALIGNANT NEOPLASM OF OVERLAPPING SITES OF RIGHT BREAST IN FEMALE, ESTROGEN RECEPTOR NEGATIVE: Primary | ICD-10-CM

## 2024-05-06 DIAGNOSIS — C50.811 MALIGNANT NEOPLASM OF OVERLAPPING SITES OF RIGHT BREAST IN FEMALE, ESTROGEN RECEPTOR NEGATIVE: Primary | ICD-10-CM

## 2024-05-06 DIAGNOSIS — G89.3 CANCER ASSOCIATED PAIN: ICD-10-CM

## 2024-05-06 DIAGNOSIS — Z79.899 HIGH RISK MEDICATION USE: ICD-10-CM

## 2024-05-06 DIAGNOSIS — R30.0 DYSURIA: ICD-10-CM

## 2024-05-06 DIAGNOSIS — Z17.1 MALIGNANT NEOPLASM OF OVERLAPPING SITES OF RIGHT BREAST IN FEMALE, ESTROGEN RECEPTOR NEGATIVE: ICD-10-CM

## 2024-05-06 DIAGNOSIS — C50.811 MALIGNANT NEOPLASM OF OVERLAPPING SITES OF RIGHT BREAST IN FEMALE, ESTROGEN RECEPTOR NEGATIVE: ICD-10-CM

## 2024-05-06 LAB
ALBUMIN SERPL-MCNC: 4 G/DL (ref 3.5–5.2)
ALBUMIN/GLOB SERPL: 1.4 G/DL
ALP SERPL-CCNC: 67 U/L (ref 39–117)
ALT SERPL W P-5'-P-CCNC: 12 U/L (ref 1–33)
ANION GAP SERPL CALCULATED.3IONS-SCNC: 12.8 MMOL/L (ref 5–15)
AST SERPL-CCNC: 22 U/L (ref 1–32)
BACTERIA UR QL AUTO: ABNORMAL /HPF
BASOPHILS # BLD AUTO: 0.04 10*3/MM3 (ref 0–0.2)
BASOPHILS NFR BLD AUTO: 0.5 % (ref 0–1.5)
BILIRUB SERPL-MCNC: 0.3 MG/DL (ref 0–1.2)
BILIRUB UR QL STRIP: NEGATIVE
BUN SERPL-MCNC: 19 MG/DL (ref 8–23)
BUN/CREAT SERPL: 19.4 (ref 7–25)
CALCIUM SPEC-SCNC: 10.1 MG/DL (ref 8.6–10.5)
CHLORIDE SERPL-SCNC: 99 MMOL/L (ref 98–107)
CLARITY UR: ABNORMAL
CO2 SERPL-SCNC: 30.2 MMOL/L (ref 22–29)
COLOR UR: YELLOW
CREAT SERPL-MCNC: 0.98 MG/DL (ref 0.57–1)
DEPRECATED RDW RBC AUTO: 59.5 FL (ref 37–54)
EGFRCR SERPLBLD CKD-EPI 2021: 65 ML/MIN/1.73
EOSINOPHIL # BLD AUTO: 0.14 10*3/MM3 (ref 0–0.4)
EOSINOPHIL NFR BLD AUTO: 1.6 % (ref 0.3–6.2)
ERYTHROCYTE [DISTWIDTH] IN BLOOD BY AUTOMATED COUNT: 16.3 % (ref 12.3–15.4)
GLOBULIN UR ELPH-MCNC: 2.8 GM/DL
GLUCOSE SERPL-MCNC: 128 MG/DL (ref 65–99)
GLUCOSE UR STRIP-MCNC: NEGATIVE MG/DL
HCT VFR BLD AUTO: 37.8 % (ref 34–46.6)
HGB BLD-MCNC: 12.3 G/DL (ref 12–15.9)
HGB UR QL STRIP.AUTO: ABNORMAL
HYALINE CASTS UR QL AUTO: ABNORMAL /LPF
IMM GRANULOCYTES # BLD AUTO: 0.02 10*3/MM3 (ref 0–0.05)
IMM GRANULOCYTES NFR BLD AUTO: 0.2 % (ref 0–0.5)
KETONES UR QL STRIP: NEGATIVE
LEUKOCYTE ESTERASE UR QL STRIP.AUTO: ABNORMAL
LYMPHOCYTES # BLD AUTO: 1.17 10*3/MM3 (ref 0.7–3.1)
LYMPHOCYTES NFR BLD AUTO: 13.6 % (ref 19.6–45.3)
MCH RBC QN AUTO: 32.3 PG (ref 26.6–33)
MCHC RBC AUTO-ENTMCNC: 32.5 G/DL (ref 31.5–35.7)
MCV RBC AUTO: 99.2 FL (ref 79–97)
MONOCYTES # BLD AUTO: 0.73 10*3/MM3 (ref 0.1–0.9)
MONOCYTES NFR BLD AUTO: 8.5 % (ref 5–12)
NEUTROPHILS NFR BLD AUTO: 6.49 10*3/MM3 (ref 1.7–7)
NEUTROPHILS NFR BLD AUTO: 75.6 % (ref 42.7–76)
NITRITE UR QL STRIP: POSITIVE
NRBC BLD AUTO-RTO: 0 /100 WBC (ref 0–0.2)
PH UR STRIP.AUTO: 6 [PH] (ref 4.5–8)
PLATELET # BLD AUTO: 299 10*3/MM3 (ref 140–450)
PMV BLD AUTO: 9.4 FL (ref 6–12)
POTASSIUM SERPL-SCNC: 4.8 MMOL/L (ref 3.5–5.2)
PROT SERPL-MCNC: 6.8 G/DL (ref 6–8.5)
PROT UR QL STRIP: NEGATIVE
RBC # BLD AUTO: 3.81 10*6/MM3 (ref 3.77–5.28)
RBC # UR STRIP: ABNORMAL /HPF
REF LAB TEST METHOD: ABNORMAL
SODIUM SERPL-SCNC: 142 MMOL/L (ref 136–145)
SP GR UR STRIP: 1.01 (ref 1–1.03)
SQUAMOUS #/AREA URNS HPF: ABNORMAL /HPF
UROBILINOGEN UR QL STRIP: ABNORMAL
WBC # UR STRIP: ABNORMAL /HPF
WBC NRBC COR # BLD AUTO: 8.59 10*3/MM3 (ref 3.4–10.8)

## 2024-05-06 PROCEDURE — 3079F DIAST BP 80-89 MM HG: CPT | Performed by: NURSE PRACTITIONER

## 2024-05-06 PROCEDURE — 85025 COMPLETE CBC W/AUTO DIFF WBC: CPT

## 2024-05-06 PROCEDURE — 99215 OFFICE O/P EST HI 40 MIN: CPT | Performed by: NURSE PRACTITIONER

## 2024-05-06 PROCEDURE — 87186 SC STD MICRODIL/AGAR DIL: CPT | Performed by: NURSE PRACTITIONER

## 2024-05-06 PROCEDURE — 87086 URINE CULTURE/COLONY COUNT: CPT | Performed by: NURSE PRACTITIONER

## 2024-05-06 PROCEDURE — 1125F AMNT PAIN NOTED PAIN PRSNT: CPT | Performed by: NURSE PRACTITIONER

## 2024-05-06 PROCEDURE — 1160F RVW MEDS BY RX/DR IN RCRD: CPT | Performed by: NURSE PRACTITIONER

## 2024-05-06 PROCEDURE — 80053 COMPREHEN METABOLIC PANEL: CPT

## 2024-05-06 PROCEDURE — 87088 URINE BACTERIA CULTURE: CPT | Performed by: NURSE PRACTITIONER

## 2024-05-06 PROCEDURE — 1159F MED LIST DOCD IN RCRD: CPT | Performed by: NURSE PRACTITIONER

## 2024-05-06 PROCEDURE — 3075F SYST BP GE 130 - 139MM HG: CPT | Performed by: NURSE PRACTITIONER

## 2024-05-06 PROCEDURE — 81001 URINALYSIS AUTO W/SCOPE: CPT | Performed by: NURSE PRACTITIONER

## 2024-05-06 RX ORDER — CIPROFLOXACIN 500 MG/1
500 TABLET, FILM COATED ORAL 2 TIMES DAILY
Qty: 10 TABLET | Refills: 0 | Status: SHIPPED | OUTPATIENT
Start: 2024-05-06

## 2024-05-07 ENCOUNTER — SPECIALTY PHARMACY (OUTPATIENT)
Dept: PHARMACY | Facility: HOSPITAL | Age: 64
End: 2024-05-07
Payer: MEDICARE

## 2024-05-09 ENCOUNTER — TELEMEDICINE (OUTPATIENT)
Dept: PSYCHIATRY | Facility: HOSPITAL | Age: 64
End: 2024-05-09
Payer: MEDICARE

## 2024-05-09 DIAGNOSIS — C50.919 PRIMARY MALIGNANT NEOPLASM OF BREAST WITH METASTASIS: ICD-10-CM

## 2024-05-09 DIAGNOSIS — F41.1 GENERALIZED ANXIETY DISORDER: ICD-10-CM

## 2024-05-09 DIAGNOSIS — G47.00 INSOMNIA, UNSPECIFIED TYPE: ICD-10-CM

## 2024-05-09 DIAGNOSIS — F43.21 GRIEF: ICD-10-CM

## 2024-05-09 DIAGNOSIS — R53.0 NEOPLASTIC MALIGNANT RELATED FATIGUE: ICD-10-CM

## 2024-05-09 LAB — BACTERIA SPEC AEROBE CULT: ABNORMAL

## 2024-05-09 RX ORDER — LORAZEPAM 0.5 MG/1
0.5 TABLET ORAL NIGHTLY
Qty: 30 TABLET | Refills: 0 | Status: SHIPPED | OUTPATIENT
Start: 2024-05-09

## 2024-05-20 ENCOUNTER — TELEPHONE (OUTPATIENT)
Dept: ONCOLOGY | Facility: CLINIC | Age: 64
End: 2024-05-20
Payer: MEDICARE

## 2024-05-20 NOTE — TELEPHONE ENCOUNTER
Caller: Martha Davey    Relationship to patient: Self    Best call back number: [phone]    Chief complaint: R/S    Type of visit: LAB, F/U & INJECTION    Requested date: AFTER  SCANS & BONE SCAN SCHEDULED FOR 6/21/24    If rescheduling, when is the original appointment: 5/28/24      Additional notes:DOES SHE STILL NEED TO COME TO GET HER XGEVA BEFORE JUNE, PLEASE ADVISE?

## 2024-05-24 DIAGNOSIS — G89.3 CANCER ASSOCIATED PAIN: ICD-10-CM

## 2024-05-24 RX ORDER — TRAMADOL HYDROCHLORIDE 50 MG/1
50 TABLET ORAL EVERY 8 HOURS PRN
Qty: 90 TABLET | Refills: 0 | Status: SHIPPED | OUTPATIENT
Start: 2024-05-24

## 2024-05-28 DIAGNOSIS — R53.0 NEOPLASTIC MALIGNANT RELATED FATIGUE: ICD-10-CM

## 2024-05-28 DIAGNOSIS — F33.1 MAJOR DEPRESSIVE DISORDER, RECURRENT EPISODE, MODERATE: ICD-10-CM

## 2024-05-28 RX ORDER — MODAFINIL 200 MG/1
200 TABLET ORAL DAILY
Qty: 30 TABLET | Refills: 2 | Status: SHIPPED | OUTPATIENT
Start: 2024-05-28

## 2024-05-28 RX ORDER — MODAFINIL 200 MG/1
200 TABLET ORAL DAILY
Qty: 30 TABLET | Refills: 2 | Status: SHIPPED | OUTPATIENT
Start: 2024-05-28 | End: 2024-05-28 | Stop reason: SDUPTHER

## 2024-05-31 RX ORDER — GABAPENTIN 300 MG/1
300 CAPSULE ORAL 3 TIMES DAILY
Qty: 270 CAPSULE | Refills: 1 | Status: SHIPPED | OUTPATIENT
Start: 2024-05-31

## 2024-06-06 ENCOUNTER — TELEMEDICINE (OUTPATIENT)
Dept: PSYCHIATRY | Facility: HOSPITAL | Age: 64
End: 2024-06-06
Payer: MEDICARE

## 2024-06-06 DIAGNOSIS — F41.1 GENERALIZED ANXIETY DISORDER: ICD-10-CM

## 2024-06-06 PROCEDURE — 1160F RVW MEDS BY RX/DR IN RCRD: CPT | Performed by: NURSE PRACTITIONER

## 2024-06-06 PROCEDURE — 1159F MED LIST DOCD IN RCRD: CPT | Performed by: NURSE PRACTITIONER

## 2024-06-06 PROCEDURE — 99214 OFFICE O/P EST MOD 30 MIN: CPT | Performed by: NURSE PRACTITIONER

## 2024-06-06 RX ORDER — LORAZEPAM 0.5 MG/1
0.5 TABLET ORAL NIGHTLY
Qty: 30 TABLET | Refills: 0 | Status: SHIPPED | OUTPATIENT
Start: 2024-06-06

## 2024-06-06 NOTE — PROGRESS NOTES
[facility]cology Services  Video visit conducted via [employer] Video Visit  You have chosen to receive care through a telehealth visit.  Do you consent to use a video/audio connection for your medical care today? YES  Telehealth provided in patient's home.  Location of Provider: [place], [place]     Subjective  Patient ID: Martha Davey is a 63 y.o. female is seen via telehealth in the [facility].    CC: Depression, grief, fatigue, sleep disruption    HPI/ Interval History:   Pt is seen in follow up regarding ongoing grief, recent loss of . Continues to endorse challenges coping with recent changes. Identifies trends toward worsening depression. Reports sleeping late, not getting up until 11 AM. Continues to watch Derrick report at night, enjoying this. Is going to bed appx 1:30 AM, falling to sleep within appx 30 minutes with assistance of ativan. Getting up anywhere between 8-11 AM. Continues to take provigil, although has been out for the last week. Believes she will be able to pick this up today. Does endorse disrupted energy, concentration and focus in acute aftermath of grief. Mourns financial changes, reduced income. Appreciates support of friends and son. Continues to spend time with dogs, greatly enjoying this.     Exam: As above    Recent Labs Reviewed:  Office Visit on 05/06/2024   Component Date Value    Color, UA 05/06/2024 Yellow     Appearance, UA 05/06/2024 Cloudy (A)     pH, UA 05/06/2024 6.0     Specific Gravity, UA 05/06/2024 1.010     Glucose, UA 05/06/2024 Negative     Ketones, UA 05/06/2024 Negative     Bilirubin, UA 05/06/2024 Negative     Blood, UA 05/06/2024 Moderate (2+) (A)     Protein, UA 05/06/2024 Negative     Leuk Esterase, UA 05/06/2024 Large (3+) (A)     Nitrite, UA 05/06/2024 Positive (A)     Urobilinogen, UA 05/06/2024 0.2 E.U./dL     RBC, UA 05/06/2024 11-20 (A)     WBC, UA 05/06/2024 11-20 (A)     Bacteria, UA 05/06/2024 1+ (A)     Squamous  Epithelial Cell* 05/06/2024 7-12 (A)     Hyaline Casts, UA 05/06/2024 None Seen     Methodology 05/06/2024 Manual Light Microscopy     Urine Culture 05/06/2024 >100,000 CFU/mL Escherichia coli (A)    Lab on 05/06/2024   Component Date Value    Glucose 05/06/2024 128 (H)     BUN 05/06/2024 19     Creatinine 05/06/2024 0.98     Sodium 05/06/2024 142     Potassium 05/06/2024 4.8     Chloride 05/06/2024 99     CO2 05/06/2024 30.2 (H)     Calcium 05/06/2024 10.1     Total Protein 05/06/2024 6.8     Albumin 05/06/2024 4.0     ALT (SGPT) 05/06/2024 12     AST (SGOT) 05/06/2024 22     Alkaline Phosphatase 05/06/2024 67     Total Bilirubin 05/06/2024 0.3     Globulin 05/06/2024 2.8     A/G Ratio 05/06/2024 1.4     BUN/Creatinine Ratio 05/06/2024 19.4     Anion Gap 05/06/2024 12.8     eGFR 05/06/2024 65.0     WBC 05/06/2024 8.59     RBC 05/06/2024 3.81     Hemoglobin 05/06/2024 12.3     Hematocrit 05/06/2024 37.8     MCV 05/06/2024 99.2 (H)     MCH 05/06/2024 32.3     MCHC 05/06/2024 32.5     RDW 05/06/2024 16.3 (H)     RDW-SD 05/06/2024 59.5 (H)     MPV 05/06/2024 9.4     Platelets 05/06/2024 299     Neutrophil % 05/06/2024 75.6     Lymphocyte % 05/06/2024 13.6 (L)     Monocyte % 05/06/2024 8.5     Eosinophil % 05/06/2024 1.6     Basophil % 05/06/2024 0.5     Immature Grans % 05/06/2024 0.2     Neutrophils, Absolute 05/06/2024 6.49     Lymphocytes, Absolute 05/06/2024 1.17     Monocytes, Absolute 05/06/2024 0.73     Eosinophils, Absolute 05/06/2024 0.14     Basophils, Absolute 05/06/2024 0.04     Immature Grans, Absolute 05/06/2024 0.02     nRBC 05/06/2024 0.0      Current Psychotropic Medications:  Duloxetine 60 mg q evening  Modafinil 200 mg q AM - has been out for past week  Ativan 0.5 mg q hs    Plan of Care/ Medical Decision Making  Continue medications as written.  Refill provided for modafinil, supporting continuing of this at this time and suspecting this may assisting with worsening depression.  Considered  importance of sleep hygiene, maintaining sleep schedule, circadian rhythm.  Will monitor closely for resurgent depression.    Diagnoses and all orders for this visit:    1. Generalized anxiety disorder  -     LORazepam (Ativan) 0.5 MG tablet; Take 1 tablet by mouth Every Night.  Dispense: 30 tablet; Refill: 0    I spent 36 minutes caring for Martha on this date of service. This time includes time spent by me in the following activities: preparing for the visit, reviewing tests, obtaining and/or reviewing a separately obtained history, performing a medically appropriate examination and/or evaluation, counseling and educating the patient/family/caregiver, ordering medications, tests, or procedures, and documenting information in the medical record.

## 2024-06-13 ENCOUNTER — SPECIALTY PHARMACY (OUTPATIENT)
Dept: PHARMACY | Facility: HOSPITAL | Age: 64
End: 2024-06-13
Payer: MEDICARE

## 2024-06-13 RX ORDER — CAPECITABINE 500 MG/1
TABLET, FILM COATED ORAL
Qty: 70 TABLET | Refills: 5 | Status: SHIPPED | OUTPATIENT
Start: 2024-06-13

## 2024-06-13 NOTE — PROGRESS NOTES
Drug: Xeloda (capecitabine)  Strength: 500 mg  Directions: Take 2 tablets by mouth  in the morning and 3 tablets in the evening for 7 days on, then 7 days off  Quantity: 70  Refills: 5  [facility]  Completed independent double check on medication order/RX.  Lubna Gupta, DimitriosD, BCPS

## 2024-06-13 NOTE — PROGRESS NOTES
Re: Refills of Oral Specialty Medication - Xeloda (capecitabine)    Drug-Drug Interactions: The current medication list was reviewed and there are no relevant drug-drug interactions with the specialty medication.  Medication Allergies: The patient has no relevant allergies as it relates to their oral specialty medication  Review of Labs/Dose Adjustments: NO DOSE CHANGE - I reviewed the most recent note and labs and the patient will continue without any dose changes.  I sent refills as described below.    Drug: Xeloda (capecitabine)  Strength: 500 mg  Directions: Take 2 tablets by mouth  in the morning and 3 tablets in the evening for 7 days on, then 7 days off  Quantity: 70  Refills: 5  Pharmacy prescription sent to: [facility]    Name/Credentials: Gage Gonsales, DimitriosD, [facility]  Clinical Oncology Pharmacist    6/13/2024  16:05 EDT

## 2024-06-21 ENCOUNTER — HOSPITAL ENCOUNTER (OUTPATIENT)
Dept: CT IMAGING | Facility: HOSPITAL | Age: 64
Discharge: HOME | End: 2024-06-21
Payer: MEDICARE

## 2024-06-21 ENCOUNTER — HOSPITAL ENCOUNTER (OUTPATIENT)
Dept: NUCLEAR MEDICINE | Facility: HOSPITAL | Age: 64
Discharge: HOME | End: 2024-06-21
Payer: MEDICARE

## 2024-06-21 DIAGNOSIS — R30.0 DYSURIA: ICD-10-CM

## 2024-06-21 DIAGNOSIS — Z17.1 MALIGNANT NEOPLASM OF OVERLAPPING SITES OF RIGHT BREAST IN FEMALE, ESTROGEN RECEPTOR NEGATIVE: ICD-10-CM

## 2024-06-21 DIAGNOSIS — C50.811 MALIGNANT NEOPLASM OF OVERLAPPING SITES OF RIGHT BREAST IN FEMALE, ESTROGEN RECEPTOR NEGATIVE: ICD-10-CM

## 2024-06-21 LAB — CREAT BLDA-MCNC: 1 MG/DL (ref 0.6–1.3)

## 2024-06-21 PROCEDURE — 78306 BONE IMAGING WHOLE BODY: CPT

## 2024-06-21 PROCEDURE — 25510000001 IOPAMIDOL 61 % SOLUTION: Performed by: INTERNAL MEDICINE

## 2024-06-21 PROCEDURE — 82565 ASSAY OF CREATININE: CPT

## 2024-06-21 PROCEDURE — 71260 CT THORAX DX C+: CPT

## 2024-06-21 PROCEDURE — A9503 TC99M MEDRONATE: HCPCS | Performed by: INTERNAL MEDICINE

## 2024-06-21 PROCEDURE — 74177 CT ABD & PELVIS W/CONTRAST: CPT

## 2024-06-21 PROCEDURE — 0 DIATRIZOATE MEGLUMINE & SODIUM PER 1 ML: Performed by: INTERNAL MEDICINE

## 2024-06-21 PROCEDURE — 0 TECHNETIUM MEDRONATE KIT: Performed by: INTERNAL MEDICINE

## 2024-06-21 RX ORDER — TC 99M MEDRONATE 20 MG/10ML
21.7 INJECTION, POWDER, LYOPHILIZED, FOR SOLUTION INTRAVENOUS
Status: COMPLETED | OUTPATIENT
Start: 2024-06-21 | End: 2024-06-21

## 2024-06-21 RX ADMIN — Medication 21.7 MILLICURIE: at 10:54

## 2024-06-21 RX ADMIN — DIATRIZOATE MEGLUMINE AND DIATRIZOATE SODIUM 30 ML: 660; 100 LIQUID ORAL; RECTAL at 11:30

## 2024-06-21 RX ADMIN — IOPAMIDOL 85 ML: 612 INJECTION, SOLUTION INTRAVENOUS at 12:25

## 2024-07-01 NOTE — PROGRESS NOTES
"Chief Complaint  Previous Stage Ib ([id number]) ER/AR positive, HER-2/peter negative right breast cancer with subsequent metastatic disease identified 10/8/2017, history of right pulmonary embolism, cancer related pain, chemotherapy-induced diarrhea, chemotherapy-induced mucositis, chemotherapy-induced hand-foot syndrome    Subjective        History of Present Illness  The patient returns today in follow-up with laboratory studies, CT scans, bone scan to review continuing on oral Xeloda 1000 mg in the morning, 1500 mg in the evening for 7 days on followed by 7 days off as well as every 3-month Xgeva (due today) and Eliquis 5 mg twice daily.  The patient today is having a difficult time as it is first time back in the office since her  , was a patient in our office as well.  She is going through grief counseling with hospice and feels that this has been beneficial.  She does have support from her sons and also was accompanied today by a friend who has been very supportive of her as well.  Patient is very tearful today.  She reports that her hand-foot symptoms have improved somewhat recently.  She has been having some difficulty with her left foot as she has in the past in relation to her braces and is working with her podiatrist and her brace specialist for adjustments.  She has developed some callus formation and apparently has some warty lesions on the foot that are being treated by the podiatrist.  She continues to use Aquaphor on a regular basis on her hands and feet.  She has a history of recurrent UTIs, has not experienced a UTI since early May, does report some dysuria today and is requesting that we check a urinalysis and culture.  She notes that her appetite is stable, weight is stable at 184 pounds today.      Objective   Vital Signs:   /87   Pulse 97   Temp 98.4 °F (36.9 °C) (Oral)   Resp 16   Ht 154.9 cm (61\")   Wt 83.6 kg (184 lb 6.4 oz)   SpO2 96%   BMI 34.84 kg/m²     Physical " Exam  Constitutional:       Appearance: She is well-developed.      Comments: Patient ambulates with the use of a cane   Eyes:      Conjunctiva/sclera: Conjunctivae normal.   Neck:      Thyroid: No thyromegaly.   Cardiovascular:      Rate and Rhythm: Normal rate and regular rhythm.      Heart sounds: No murmur heard.     No friction rub. No gallop.   Pulmonary:      Effort: No respiratory distress.      Breath sounds: Normal breath sounds.   Abdominal:      General: Bowel sounds are normal. There is no distension.      Palpations: Abdomen is soft.      Tenderness: There is no abdominal tenderness.   Musculoskeletal:      Comments: Braces in place in the bilateral lower extremities   Lymphadenopathy:      Head:      Right side of head: No submandibular adenopathy.      Cervical: No cervical adenopathy.      Upper Body:      Right upper body: No supraclavicular adenopathy.      Left upper body: No supraclavicular adenopathy.   Skin:     General: Skin is warm and dry.      Findings: Rash present.      Comments: Erythema involving the palms has improved and is minimal.  The degree of skin peeling has improved significantly with minimal present currently.  There is minimal erythema involving the dorsal aspect of the fingers that has improved as well.  Sclerodactyly does persist but has improved with increased mobility of her fingers   Neurological:      Mental Status: She is alert and oriented to person, place, and time.      Cranial Nerves: No cranial nerve deficit.      Motor: No abnormal muscle tone.      Deep Tendon Reflexes: Reflexes normal.   Psychiatric:         Behavior: Behavior normal.           Result Review : Reviewed CBC, CMP, magnesium, phosphorus from today.  Reviewed CT chest abdomen pelvis and bone scan from 6/21/2024.     Assessment and Plan     *Previous Stage Ib ([id number]) right breast cancer:  Diagnosed May 2010 with excisional biopsy for invasive ductal carcinoma, 1.3 cm, grade 2, ER 90%, NH 80%,  HER-2/peter negative (1+ IHC).    Subsequent right mastectomy in July 2010 with no residual breast malignancy, 1/5 sentinel lymph node with micrometastasis (0.25 mm).    Treated in the [facility] system with adjuvant AC ×4 cycles in 2010 (no taxanes administered due to underlying Charcot-Saloni-Tooth with peripheral neuropathy).    Adjuvant Femara (postmenopausal) initiated October 2010 with plan to treat ×10 years.    Genetic testing reportedly negative.    Developed osteopenia treated with Prolia beginning 2/27/13. Subsequently discontinued due to identification of metastatic disease.    *Recurrent/metastatic disease identified 10/8/17:  Disease involving thoracic spine with cord compression at T6, lumbosacral involvement, sternal and right sternoclavicular involvement.    Femara discontinued in 10/2017.    Radiation administered (in the [facility] system) to the thoracic spine beginning 10/19/17 treating T3-T9 to a dose of 24 gray in 6 fractions.  Evaluation with MRI 12/8/17 showing persistent T6 cord compression with persistent neurologic compromise requiring surgical treatment 12/11/17 with T6 laminectomy/corpectomy and T3-T9 fusion.  Pathology with metastatic carcinoma of breast origin, ER negative, AR negative, HER-2/peter negative (1+ IHC).  Additional staging evaluation 12/8/17 with no evidence of visceral metastatic disease, bone scan showing involvement of thoracic spine, sternum, left humerus, mid frontal bone.  No plane film correlate of left humerus lesion.  MRI lumbar spine with small intradural L3 metastasis.  CA 15-3 12/6/17- 17.  Palliative radiation therapy to L3 dural metastasis and left humerus initiated 1/15/18 (10 fractions), completed 1/26/18.  Hypercalcemia of malignancy with calcium in the 10-11 range.  Initiation of monthly Xgeva 1/23/18.  Baseline CT scan 1/30/18 with no evidence of visceral involvement.  Cluster of nodular opacities in the right lower lobe suspected to be infectious or related to  bronchiolitis. Bone scan 1/30/18 showed postsurgical change in the thoracic spine, stable uptake in the frontal bone, no new areas of disease.  Initiation of palliative oral single agent Xeloda 2/7/18 2000 mg a.m., 1500 mg p.m. for 14/21 days.   Following 3 cycles xeloda, bone scan 4/4/18 showed no change from the prior study.  CT scan 4/4/18 showed a small pericardial effusion of unclear significance as well as a subcutaneous nodule in the right lateral chest wall.  Subsequent evaluation with echocardiogram 4/17/18 showed no evidence of pericardial effusion.  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  We discussed the possibility of surgical excision of the right subcutaneous chest wall lesion for more definitive diagnosis.  Reviewed previous CT images dating back to 12/8/17 and the lesion was present even at that time measuring around 1.7 cm although not commented on in the radiology report.  As this appears to be an indolent low-grade process unrelated to her breast cancer, recommendee foregoing surgical excision at this time and monitoring this area on future scans.  The patient agreed.    Following 6 cycles of Xeloda, CT 6/6/18  showed stable findings, no evidence of progressive disease.  There was a comment regarding subcutaneous abnormality in the anterior abdominal wall and this was related to Lovenox injection sites.  Bone scan 6/6/18 showed no interval change.   CT scan and bone scan 8/13/18 following 9 cycles of Xeloda showed stable findings with no evidence of significant visceral metastases.  Her bone lesions appear stable on bone scan.  The spindle cell neoplasm in her right chest wall actually decreased in size from 2 cm down to 1.6 cm.    The patient experienced some symptoms of diarrhea, anorexia, generalized weakness during cycle 9 Xeloda it was unclear  whether this was related to a viral gastroenteritis or toxicity from treatment.  Symptoms recurred during cycle 10 and treatment was cut short by 2 days.  Symptoms attributed to Xeloda.  With cycle 11, dose and schedule altered to 1500 mg twice daily for 7 days on followed by 7 days off .  CT scan 9/9/2020 with no significant changes.  Bone scan 9/9/2020 read as unchanged from prior studies however did note an area of slight activity in the medial left femur.  Contacted radiology and although this was not noted on prior reports appears to have been present.  Subsequent MRI left femur 9/21/2020 with cortical thickening and periosteal edema left iliac us muscle insertion to the medial left femur with no evidence of metastatic disease, favored to represent periosteal change secondary to insertional tendinitis.  Severe hand-foot symptoms causing sclerodactyly and limitation in finger movement prompted change in dosing in July 2021 with Xeloda decreased to 1000 mg a.m., 1500 mg p.m. for 7 days on followed by 7 days off.  Patient was experiencing severe hand-foot syndrome related to Xeloda having developed skin peeling, sclerodactyly with limited use of her hands.  On 3/31/2022, Xeloda was held to allow for recovery.  Patient resumed Xeloda on 4/18/2022.  Patient required hospitalization 5/29 - 6/1/2022 with presumed viral gastroenteritis that was exacerbated by Xeloda.  Xeloda held briefly, resumed on 6/6/2022.  Patient again with worsening sclerodactyly, Xeloda held for 2 weeks from 10/3 - 10/17/2022, resumed at prior dose with improvement in symptoms.  Scans on 3/3/2023 with CT chest abdomen pelvis and bone scan showing stable findings.  CT chest abdomen pelvis 7/3/2023 with questionable 1 mm change in size right lower lobe nodule.  Additional small pulmonary nodules stable.  Stable bony metastatic disease.  Bone scan on 7/7/2023 however showed slight progression focal involvement right ischium and superior pubic ramus  (new ischial lesion superior pubic ramus compared to 10/24/2022 and more conspicuous compared to 3/3/2023).  Metastatic lesion right inferior pubic ramus and ischial tuberosity increased in size since October 2022 however stable from 3/3/2023.  MRI cervical spine 7/3/2023 with no metastatic involvement however identification of the lesion at T2, recommended dedicated thoracic spine MRI to evaluate further.  Given the minimal extent and slow degree of progression, elected to continue oral Xeloda and evaluate further with MRI pelvis and thoracic spine.  Consideration of biopsy pelvic metastasis for repeat ER/OK/HER2/peter testing.    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  CA 15-3 on 7/10/2023 was 12.2, uninformative.  MRI thoracic spine 7/27/2023 with 1 cm metastatic focus seen in L1  MRI pelvis 7/27/2023 with metastatic foci identified right superior pubic ramus, right ischial tuberosity, inferior pubic ramus, L5 body.  Chronic appearing posttraumatic and/or degenerative change in the posterior right ilium adjacent SI joint.  CT-guided biopsy right pubic ramus lesion on 8/31/2023.  Pathology showed no evidence of malignancy, showed markedly calcified and sclerotic mature lamellar bone.  Attempted decalcification but no evidence of malignancy.  CT chest abdomen pelvis 9/8/2023 and bone scan 9/11/2023 with stable findings.  CT chest abdomen pelvis 12/8/2023 with possible slight increase in right chest wall subcutaneous lesion (1.8 x 1.1 up to 1.8 x 1.4 cm) although may be related to altered positioning.  Nonpathologically enlarged right axillary and subpectoral lymph nodes slightly increased (patient notes she received RSV vaccine right arm just prior to scan).  Bone scan 12/11/2023 with stable findings.  CT chest abdomen pelvis 3/19/2024 with slight interval decrease in size of right axillary and bilateral subpectoral lymph nodes, right subcutaneous soft  tissue nodule slightly smaller, stable pulmonary nodules, stable bone metastases.  Bone scan 3/19/2024 with stable findings.  CT chest abdomen pelvis 6/21/2024 showed left subpectoral lymph node to have increased slightly in size (0.9 x 0.6 up to 1.3 x 0.8 cm).  Subcutaneous nodule right chest wall decreased from 1.1 x 1.9 down to 1.1 x 1.4 cm.  Scan otherwise stable.  Bone scan 6/21/2024 with stable findings  The patient returns today continuing on treatment with Xeloda 1000 mg a.m., 1500 mg p.m. 7 days on followed by 7 days off.  She also continues on monthly Xgeva due today.  Reviewed preinterval follow-up CT and bone scan from 6/21/2024.  Scans show slight increase in a borderline enlarged left subpectoral lymph node, decrease in the subcutaneous nodule right chest wall.  Significance of these findings is unclear.  The subcutaneous nodule in the right chest wall corresponds to lesion that was previously biopsied in 2018 which showed a low-grade spindle cell neoplasm, was not related to breast cancer.  We discussed that the findings are not significant in nature currently and overall her disease is very stable.  I did recommend 3-month interval scans again.  Overall she is doing well on Xeloda.  Her hand-foot symptoms have actually improved somewhat with improvement in her sclerodactyly and dexterity, decrease in the degree of skin peeling in her hands.  The patient is obviously upset today as this is the first time back in our office since the death of her  who is a patient in our practice as well.  She was tearful today.  She was accompanied by a friend and does have support at home from her sons as well.  She is undergoing grief counseling through hospice which has helped.  Support was provided today.  She will return here in 6 weeks for NP visit and I will see her back in 3 months with repeat CT chest abdomen pelvis and bone scan to review.  She is due for Xgeva today and will be due again when she  returns here in 3 months.    *Right pulmonary embolism:  Diagnosed on CT angiogram 10/21/17 involving small right lower lobe pulmonary artery.  Lower extremity Dopplers negative.  Bilateral lower extremity Dopplers negative again 12/5/17.  Received chronic Lovenox 1 mg/kg twice per day, transitioned to oral Eliquis in February 2019, continuing on Eliquis 5 mg twice daily.  Patient continues on Eliquis 5 mg twice daily    *Cancer related pain:  Previously receiving Duragesic 50 µg patch every 72 hours along with Dilaudid 4 mg as needed for breakthrough pain  The patient's pain improved over time and she was able to discontinue both Duragesic and Dilaudid in the interval.  Patient does take occasional Flexeril at bedtime due to back spasm/pain when she has been more active.  The patient does have some occasional aggravation of her chronic back pain and does use tramadol 50 mg every 8 hours as needed  Patient was receiving tramadol 50 mg every 8-12 hours as needed in addition to hydrocodone 5/325 every 4 hours as needed.  She was also taking OTC NSAIDs.  Patient developed nausea related to hydrocodone and was transitioned to Percocet 5/325 every 4 hours as needed, advised to stop taking NSAIDs with ongoing anticoagulation.  Patient notes pain in her shoulders that does wax and wane is unrelated to her malignancy.  She does continue on tramadol 50 mg every 8 hours and Percocet 5/325 every 4 hours as needed    *Chemotherapy-induced diarrhea with subsequent C. difficile colitis in the setting of previous ulcerative colitis and then identification of enteropathogenic E. coli:  Patient with reported history of ulcerative colitis, continues on mesalamine.  The patient developed initial diarrhea related to Xeloda at regular dosing.  Symptoms improved on reduced dose Xeloda  Flare of symptoms in October 2018 with apparent finding of C. difficile colitis by GI, treated with course of oral vancomycin with improvement in  symptoms.  Patient notes minimal intermittent diarrhea/loose stools on Xeloda requiring occasional dosing of Imodium.    Patient required hospitalization 5/29 - 6/1/2022 with presumed viral gastroenteritis that was exacerbated by Xeloda.  Xeloda held briefly, resumed on 6/6/2022.  Patient's  developed C. difficile colitis and patient was experiencing increase in diarrhea.  Stool studies performed 8/4/22 negative C. difficile however GI PCR was positive for enteropathogenic E. coli.  Given patient's symptoms and immunocompromise status it was elected to treat her with azithromycin 500 mg daily x3 days beginning 8/5/2022  Diarrhea resolved  Patient underwent colonoscopy on 2/28/2023 with findings of diverticulosis  Patient notes that bowel function has been stable recently    *Traumatic left tibia/fibular fracture:  Status post ORIF 12/6/17  Specimen was sent for pathologic review, negative for evidence of malignancy    *Hypercalcemia:  Suspect hypercalcemia of malignancy, calcium in  10-11 range previously.  Calcium normalized following initiation of monthly Xgeva on 1/23/18.    *Chemotherapy-induced mucositis:  Patient has not experienced any recent difficulty with mucositis.    *Recurrent UTI, bladder wall thickening on CT:  Patient had an enterococcal UTI on 3/2/18 sensitive to nitrofurantoin and received treatment, unclear how long.  Recurrent UTI 3/20/18 with urine culture growing Klebsiella, initially treated with nitrofurantoin, transitioned to Levaquin.  CT 4/4/18 with diffuse bladder wall thickening with increased nodular thickening at the left base.  Referral to urogynecology Dr. May Johnson.  She was placed on a prophylactic dose of trimethoprim 100 mg daily, bladder wall thickening felt to be related to recent recurrent urinary tract infections.  Patient with urinary symptoms, treated with course of Macrobid at the end of December 2018, urine culture however was negative 12/31/18.  Patient was found  to have Klebsiella UTI 7/29/2019 which was successfully treated with Macrobid with complete resolution of symptoms.  CT 8/10/2021 with diffuse bladder wall thickening new from 5/17/2021 (however seen on multiple prior scans).    UTI on 10/18/2021 with E. coli greater than 100,000 colonies that was pansensitive, treated with Macrobid x7 days  With ongoing/recurrent UTIs patient was seen by urogynecology and initiated suppressive therapy with trimethoprim 100 mg daily in December 2021.  She has not experienced any further urinary tract infections.  CT abdomen pelvis 3/28/2022 does show diffuse thickening of urinary bladder as has been seen on prior studies indicative of cystitis.  Patient notes that she did stop taking trimethoprim on a daily basis.    Patient with urine culture on 5/5/2023 that grew E. coli and she received antibiotic treatment from urogynecology.    Urine culture on 8/23/2023 with greater than 100,000 colonies of E. coli resistant to ampicillin and Bactrim.  Received 5-day course of Cipro with resolution of symptoms.  Patient did have UTI with E. coli on culture 10/2/2023, received a course of Augmentin.  Patient with dysuria again today and we will check urinalysis and urine culture with potential need for antibiotics pending results.    *Charcot-Saloni-Tooth with mobility difficulties:  The patient underwent an intensive course of rehabilitation at [facility].  She graduated from her outpatient course November 2018.  Overall the patient has improved dramatically in terms of mobility,   Patient developed significant callus formation lateral aspect of the left plantar surface.  She continues follow-up her brace specialist and her podiatrist Dr. Ware.    *Depression:  The patient is continuing follow-up in the supportive oncology clinic and is currently continuing on Cymbalta 30 mg twice daily as well as gabapentin 600 mg nightly, temazepam 15 mg nightly as needed, Provigil 100 mg daily.  Patient does  continue to attend support groups at [place].  The patient does continue routine follow-up in supportive oncology clinic.    Patient does have multiple stressors with her 's malignancy and chemotherapy as well as her autistic son who is living with them at home.  As above, patient returns today for the first time after her  passed away who was a patient in our practice.  She is obviously upset, tearful today.  She is accompanied by a friend in the office today.  Her sons provide support at home.  She is participating in the counseling with hospice which has been helpful.  Support was provided today.    *Hand-foot syndrome secondary to Xeloda:  Patient continues with frequent application of emollient cream to the hands and feet  Symptoms increased significantly requiring a 1 week delay in cycle 18 Xeloda as noted above.  Symptoms did improve and she continued on the same dose.    Patient was referred to dermatology and has been continuing on triamcinolone 0.1% cream used for 1 week on followed by 1 week off which led to some further improvement.   Progressive palmar erythema with development of sclerodactyly and effect on dexterity.  Addition of urea-based cream 3 times daily.  Patient with continued difficulty regarding erythema of her hands that extended onto the dorsal aspect and was causing contractures/sclerodactyly in her fingers affecting her dexterity.  In July 2021, held Xeloda for an additional week and then reduced dose from 1500 mg twice daily down to 1000 mg a.m. and 1500 mg p.m. and continued on a 7-day on followed by 7-day off schedule.  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  On 3/31/2022, Xeloda was held to allow for recovery.  Patient resumed  Xeloda on 4/18/2022.  Patient developed worsening sclerodactyly affecting dexterity that required Xeloda to again be briefly held for 2 weeks from 10/3 - 10/17/2022.  Treatment resumed at prior dose after improvement in symptoms.  Patient continues to use emollient cream frequently (currently 5 times daily) and topical triamcinolone 0.1% twice daily intermittently (2 weeks on followed by 2 weeks off as instructed by dermatology).  Patient has experienced improvement in symptoms with decrease in degree of sclerodactyly and improvement in her dexterity, decrease in the degree of erythema in the palmar surface and dorsal aspect of the hand and reduced degree of skin peeling.    *Evidence of steatosis on scans with mild intermittent elevated liver function studies:  Liver function studies increased 8/20/19 with ALT 98, AST 70, normal total bilirubin.  Negative viral hepatitis A, B, and C panel 8/23/18  Likely related to hepatic steatosis.   Subsequent improvement in LFTs  LFTs today are normal    *Chemotherapy induced leukopenia:  WBC today 10.34 with normal differential    *GERD, question of celiac disease:  Patient with significant history of reflux  Patient currently receiving Protonix 40 mg daily daily and Carafate 1 g 4 times daily as needed  Patient required hospitalization 5/29 - 6/1/2022 with presumed viral gastroenteritis that was exacerbated by Xeloda.    EGD performed 5/31/2022 during hospitalization with biopsy that showed focal blunting of villi and atrophy with slight increase in intraepithelial lymphocytes.  Changes were minimal but recommended correlation with serology to exclude celiac disease.  Celiac serology 8/8/2022 negative  Patient previously developed worsening reflux symptoms, temporarily increase Protonix to 40 mg twice daily and we did add Carafate 1 g 4 times daily.    She is taking Protonix 40 mg once daily, is not taking Carafate.    *Insomnia:  Patient with prior paradoxical reaction to  Benadryl  Improved previously on temazepam 15 mg nightly as needed.   Patient noted subsequently that temazepam was having no effect.   Patient continuing on gabapentin 900 mg nightly    *Health maintenance:  Patient notes that she has a history of colon polyps as well as ulcerative colitis and was due for a follow-up colonoscopy on 9/12/2019.  We did discuss there is no necessity to pursue colonoscopy in the setting of her metastatic breast cancer.    The patient did undergo colonoscopy on 2/7/2020 with findings of muscular hypertrophy and diverticulosis.   Patient did wish to proceed with further colonoscopic evaluation, performed on 12/20/2022 with poor prep.  Repeat 2/28/2023 with diverticulosis.    *Bilateral shoulder pain:  Chronic difficulty with right shoulder although she has not complained of this in prior visits to our office.  She reported difficulty with abduction.    The patient did undergo MRI of her right shoulder on 11/21/2019 at an outside facility showing multiple abnormalities including supraspinatus tendinosis, labral tear but no evidence of metastatic disease.  Patient developed pain in the left shoulder, was seen by orthopedics and underwent steroid injection with some improvement.  Right shoulder stable.  Patient did undergo physical therapy with some improvement.  She continues to use tramadol 50 mg every 8-12 hours as needed.  Note that current CT 10/20/2022 made notation of left shoulder probable bursitis.    Patient continues with bilateral shoulder pain, left greater than right which does wax and wane.      *Ocular changes in part related to Xeloda:  Patient experienced a mild degree of blurred vision as well as burning and pruritus.  She was seen by her ophthalmologist and was placed on xiidra ophthalmic drops which have helped.  Likely both issues are to some extent related to Xeloda.  Symptoms did worsen and patient was seen by ophthalmologist at [facility].  She is now  using an ocular lubricant at night in addition to artificial tears which have helped.  Symptoms currently stable to improved    *Elevated glucose:  It is noted the patient's glucose at the last few visits has been in the high 100 range, postprandial.  She has had a hemoglobin A1c that has been in the high 5-6 range in the past  Hemoglobin A1c was last checked on 12/15/2023 at 5.7    *Possible loosening of pedicle screw at T3:  CT cervical spine 5/17/2021 showed loosening of the left pedicle screw at T3.  Patient referred to orthopedics and was seen by Dr. Nayak who felt that there were no concerning findings on the scan    *Iron deficiency anemia  Labs on 5/2/2022 with hemoglobin 10.8.  Additional labs with iron 48, ferritin 21.2, iron saturation 11%, TIBC 4 and 32, folate greater than 20, B12 greater than 2000.    Attempted treatment with oral iron daily however patient was intolerant  Patient subsequently received Venofer 300 mg weekly x4 beginning 6/6/2022 and completed on 6/27/2022  Subsequent iron studies on 7/11/2022 showed improvement with iron 62, ferritin 345, iron saturation 18%, TIBC 336.  Hemoglobin had improved up to 12.7 at that time.  Repeat iron studies on 10/3/2022 showed iron replete status with hemoglobin 13.7, iron 121, ferritin 208.7, iron saturation 31%, TIBC 392.  Hemoglobin currently normal at 12.9     Plan:  Continue palliative single agent Xeloda 1000 mg a.m., 1500 mg in the p.m. 7 days on followed by 7 days off (due to begin next week of treatment on 7//24)  Proceed with every 3-month Xgeva today  Continue Eliquis 5 mg twice daily  The patient will continue frequent use of emollient cream currently 5 times daily and continue periodic triamcinolone cream 0.1% twice daily (provided by dermatology) 2 weeks on followed by 2 weeks off as prescribed  Continue Protonix 40 mg daily  Continue ocular lubricating gel nightly per ophthalmology  Continue Provigil 100 mg daily and Cymbalta 30 mg twice  daily.  Patient continues routine follow-up with supportive oncology   Patient will continue gabapentin 900 mg at night.   Patient continues on tramadol 50 mg every 8 hours as needed for pain  Continue Percocet 5/325 every 4 hours as needed for pain  Patient will continue with grief counseling with hospice in regards to recent death of her   Patient continues follow-up with supportive oncology  Urinalysis and culture today.  In 6 weeks NP visit with CBC, CMP  MD visit in 3 months with CBC, CMP, magnesium, phosphorus.  Patient will be due for Xgeva.  1 week prior to visit CT chest abdomen pelvis and bone scan.     Patient continuing on high risk medication requiring intensive monitoring.       I did spend 50 minutes in total time caring for the patient today, time spent in review of records, face-to-face consultation, coordination of care, placement of orders, completion of documentation.

## 2024-07-02 ENCOUNTER — INFUSION (OUTPATIENT)
Dept: ONCOLOGY | Facility: HOSPITAL | Age: 64
End: 2024-07-02
Payer: MEDICARE

## 2024-07-02 ENCOUNTER — TELEMEDICINE (OUTPATIENT)
Dept: PSYCHIATRY | Facility: HOSPITAL | Age: 64
End: 2024-07-02
Payer: MEDICARE

## 2024-07-02 ENCOUNTER — LAB (OUTPATIENT)
Dept: LAB | Facility: HOSPITAL | Age: 64
End: 2024-07-02
Payer: MEDICARE

## 2024-07-02 ENCOUNTER — SPECIALTY PHARMACY (OUTPATIENT)
Dept: ONCOLOGY | Facility: HOSPITAL | Age: 64
End: 2024-07-02
Payer: MEDICARE

## 2024-07-02 ENCOUNTER — OFFICE VISIT (OUTPATIENT)
Dept: ONCOLOGY | Facility: CLINIC | Age: 64
End: 2024-07-02
Payer: MEDICARE

## 2024-07-02 VITALS
DIASTOLIC BLOOD PRESSURE: 87 MMHG | BODY MASS INDEX: 34.81 KG/M2 | WEIGHT: 184.4 LBS | RESPIRATION RATE: 16 BRPM | OXYGEN SATURATION: 96 % | HEART RATE: 97 BPM | SYSTOLIC BLOOD PRESSURE: 138 MMHG | TEMPERATURE: 98.4 F | HEIGHT: 61 IN

## 2024-07-02 DIAGNOSIS — R30.0 DYSURIA: Primary | ICD-10-CM

## 2024-07-02 DIAGNOSIS — F33.1 MAJOR DEPRESSIVE DISORDER, RECURRENT EPISODE, MODERATE: Primary | ICD-10-CM

## 2024-07-02 DIAGNOSIS — Z17.1 MALIGNANT NEOPLASM OF OVERLAPPING SITES OF RIGHT BREAST IN FEMALE, ESTROGEN RECEPTOR NEGATIVE: ICD-10-CM

## 2024-07-02 DIAGNOSIS — C50.811 MALIGNANT NEOPLASM OF OVERLAPPING SITES OF RIGHT BREAST IN FEMALE, ESTROGEN RECEPTOR NEGATIVE: ICD-10-CM

## 2024-07-02 DIAGNOSIS — F41.1 GENERALIZED ANXIETY DISORDER: ICD-10-CM

## 2024-07-02 DIAGNOSIS — Z17.1 MALIGNANT NEOPLASM OF OVERLAPPING SITES OF RIGHT BREAST IN FEMALE, ESTROGEN RECEPTOR NEGATIVE: Primary | ICD-10-CM

## 2024-07-02 DIAGNOSIS — R53.0 NEOPLASTIC MALIGNANT RELATED FATIGUE: ICD-10-CM

## 2024-07-02 DIAGNOSIS — F43.21 GRIEF: ICD-10-CM

## 2024-07-02 DIAGNOSIS — C50.811 MALIGNANT NEOPLASM OF OVERLAPPING SITES OF RIGHT BREAST IN FEMALE, ESTROGEN RECEPTOR NEGATIVE: Primary | ICD-10-CM

## 2024-07-02 LAB
ALBUMIN SERPL-MCNC: 3.9 G/DL (ref 3.5–5.2)
ALBUMIN/GLOB SERPL: 1.4 G/DL
ALP SERPL-CCNC: 71 U/L (ref 39–117)
ALT SERPL W P-5'-P-CCNC: 6 U/L (ref 1–33)
ANION GAP SERPL CALCULATED.3IONS-SCNC: 12.3 MMOL/L (ref 5–15)
AST SERPL-CCNC: 25 U/L (ref 1–32)
BACTERIA UR QL AUTO: ABNORMAL /HPF
BASOPHILS # BLD AUTO: 0.08 10*3/MM3 (ref 0–0.2)
BASOPHILS NFR BLD AUTO: 0.8 % (ref 0–1.5)
BILIRUB SERPL-MCNC: 0.3 MG/DL (ref 0–1.2)
BILIRUB UR QL STRIP: NEGATIVE
BUN SERPL-MCNC: 25 MG/DL (ref 8–23)
BUN/CREAT SERPL: 25.3 (ref 7–25)
CALCIUM SPEC-SCNC: 9.8 MG/DL (ref 8.6–10.5)
CHLORIDE SERPL-SCNC: 104 MMOL/L (ref 98–107)
CLARITY UR: ABNORMAL
CO2 SERPL-SCNC: 28.7 MMOL/L (ref 22–29)
COLOR UR: YELLOW
CREAT SERPL-MCNC: 0.99 MG/DL (ref 0.57–1)
DEPRECATED RDW RBC AUTO: 58.1 FL (ref 37–54)
EGFRCR SERPLBLD CKD-EPI 2021: 64.2 ML/MIN/1.73
EOSINOPHIL # BLD AUTO: 0.22 10*3/MM3 (ref 0–0.4)
EOSINOPHIL NFR BLD AUTO: 2.1 % (ref 0.3–6.2)
ERYTHROCYTE [DISTWIDTH] IN BLOOD BY AUTOMATED COUNT: 16.9 % (ref 12.3–15.4)
GLOBULIN UR ELPH-MCNC: 2.7 GM/DL
GLUCOSE SERPL-MCNC: 116 MG/DL (ref 65–99)
GLUCOSE UR STRIP-MCNC: NEGATIVE MG/DL
HCT VFR BLD AUTO: 39.6 % (ref 34–46.6)
HGB BLD-MCNC: 12.9 G/DL (ref 12–15.9)
HGB UR QL STRIP.AUTO: ABNORMAL
IMM GRANULOCYTES # BLD AUTO: 0.05 10*3/MM3 (ref 0–0.05)
IMM GRANULOCYTES NFR BLD AUTO: 0.5 % (ref 0–0.5)
KETONES UR QL STRIP: NEGATIVE
LEUKOCYTE ESTERASE UR QL STRIP.AUTO: ABNORMAL
LYMPHOCYTES # BLD AUTO: 1.41 10*3/MM3 (ref 0.7–3.1)
LYMPHOCYTES NFR BLD AUTO: 13.6 % (ref 19.6–45.3)
MAGNESIUM SERPL-MCNC: 2.3 MG/DL (ref 1.6–2.4)
MCH RBC QN AUTO: 31.6 PG (ref 26.6–33)
MCHC RBC AUTO-ENTMCNC: 32.6 G/DL (ref 31.5–35.7)
MCV RBC AUTO: 97.1 FL (ref 79–97)
MONOCYTES # BLD AUTO: 0.74 10*3/MM3 (ref 0.1–0.9)
MONOCYTES NFR BLD AUTO: 7.2 % (ref 5–12)
NEUTROPHILS NFR BLD AUTO: 7.84 10*3/MM3 (ref 1.7–7)
NEUTROPHILS NFR BLD AUTO: 75.8 % (ref 42.7–76)
NITRITE UR QL STRIP: POSITIVE
NRBC BLD AUTO-RTO: 0 /100 WBC (ref 0–0.2)
PH UR STRIP.AUTO: 6 [PH] (ref 4.5–8)
PHOSPHATE SERPL-MCNC: 4.3 MG/DL (ref 2.5–4.5)
PLATELET # BLD AUTO: 310 10*3/MM3 (ref 140–450)
PMV BLD AUTO: 10 FL (ref 6–12)
POTASSIUM SERPL-SCNC: 4.6 MMOL/L (ref 3.5–5.2)
PROT SERPL-MCNC: 6.6 G/DL (ref 6–8.5)
PROT UR QL STRIP: ABNORMAL
RBC # BLD AUTO: 4.08 10*6/MM3 (ref 3.77–5.28)
RBC # UR STRIP: ABNORMAL /HPF
REF LAB TEST METHOD: ABNORMAL
SODIUM SERPL-SCNC: 145 MMOL/L (ref 136–145)
SP GR UR STRIP: 1.02 (ref 1–1.03)
SQUAMOUS #/AREA URNS HPF: ABNORMAL /HPF
UROBILINOGEN UR QL STRIP: ABNORMAL
WBC # UR STRIP: ABNORMAL /HPF
WBC NRBC COR # BLD AUTO: 10.34 10*3/MM3 (ref 3.4–10.8)

## 2024-07-02 PROCEDURE — 81001 URINALYSIS AUTO W/SCOPE: CPT | Performed by: INTERNAL MEDICINE

## 2024-07-02 PROCEDURE — 87086 URINE CULTURE/COLONY COUNT: CPT | Performed by: INTERNAL MEDICINE

## 2024-07-02 PROCEDURE — 80053 COMPREHEN METABOLIC PANEL: CPT

## 2024-07-02 PROCEDURE — 1160F RVW MEDS BY RX/DR IN RCRD: CPT | Performed by: NURSE PRACTITIONER

## 2024-07-02 PROCEDURE — 1159F MED LIST DOCD IN RCRD: CPT | Performed by: NURSE PRACTITIONER

## 2024-07-02 PROCEDURE — 25010000002 DENOSUMAB 120 MG/1.7ML SOLUTION: Performed by: INTERNAL MEDICINE

## 2024-07-02 PROCEDURE — 83735 ASSAY OF MAGNESIUM: CPT

## 2024-07-02 PROCEDURE — 84100 ASSAY OF PHOSPHORUS: CPT

## 2024-07-02 PROCEDURE — 36415 COLL VENOUS BLD VENIPUNCTURE: CPT

## 2024-07-02 PROCEDURE — 85025 COMPLETE CBC W/AUTO DIFF WBC: CPT

## 2024-07-02 PROCEDURE — 87088 URINE BACTERIA CULTURE: CPT | Performed by: INTERNAL MEDICINE

## 2024-07-02 PROCEDURE — 99214 OFFICE O/P EST MOD 30 MIN: CPT | Performed by: NURSE PRACTITIONER

## 2024-07-02 PROCEDURE — 87186 SC STD MICRODIL/AGAR DIL: CPT | Performed by: INTERNAL MEDICINE

## 2024-07-02 PROCEDURE — 96372 THER/PROPH/DIAG INJ SC/IM: CPT

## 2024-07-02 RX ORDER — LORAZEPAM 0.5 MG/1
0.5 TABLET ORAL NIGHTLY
Qty: 30 TABLET | Refills: 0 | Status: SHIPPED | OUTPATIENT
Start: 2024-07-02

## 2024-07-02 RX ADMIN — DENOSUMAB 120 MG: 120 INJECTION SUBCUTANEOUS at 11:53

## 2024-07-02 NOTE — PROGRESS NOTES
Specialty Pharmacy Patient Management Program  Oncology 6-Month Clinical Assessment       Martha Davey is a 63 y.o. female with breast cancer seen today to assess adherence and side effects.    Regimen: capecitabine 1000 mg po every morning and 1500 mg po every evening for 7 days on then 7 days off. Repeat.     Reason for Outreach: Routine medication check-in .       Problem list reviewed by Lubna Gupta RPH on 7/2/2024 at 12:28 PM  Medicines reviewed by Lubna Gupta RPH on 7/2/2024 at 12:29 PM  Allergies reviewed by Lubna Gupta RPH on 7/2/2024 at 12:28 PM     Goals Addressed Today        Specialty Pharmacy General Goal      Progression free survival             Medication Assessment:  Medication Assessment  Follow Up Clinical Assessment  Linked Medication(s) Assessed: Capecitabine  Therapeutic appropriateness: Appropriate  Medication tolerability: Tolerating with no to minimal ADRs  Medication plan: Continue therapy with normal follow-up  Quality of Life Improvement Scale: 5-No change  Administration: Patient is taking every day at the same time , twice daily, and as prescribed .   Patient can self administer medications: yes  Medication Follow-Up Plan: Next clinical assessment  Lab Review: The labs listed below have been reviewed. No dose adjustments are needed for the oral specialty medication(s) based on the labs.    Lab Results   Component Value Date    GLUCOSE 116 (H) 07/02/2024    CALCIUM 9.8 07/02/2024     07/02/2024    K 4.6 07/02/2024    CO2 28.7 07/02/2024     07/02/2024    BUN 25 (H) 07/02/2024    CREATININE 0.99 07/02/2024    EGFRIFAFRI 85 11/12/2021    EGFRIFNONA 56 (L) 02/21/2022    BCR 25.3 (H) 07/02/2024    ANIONGAP 12.3 07/02/2024     Lab Results   Component Value Date    WBC 10.34 07/02/2024    RBC 4.08 07/02/2024    HGB 12.9 07/02/2024    HCT 39.6 07/02/2024    MCV 97.1 (H) 07/02/2024    MCH 31.6 07/02/2024    MCHC 32.6 07/02/2024    RDW 16.9 (H) 07/02/2024    RDWSD 58.1  (H) 07/02/2024    MPV 10.0 07/02/2024     07/02/2024    NEUTRORELPCT 75.8 07/02/2024    LYMPHORELPCT 13.6 (L) 07/02/2024    MONORELPCT 7.2 07/02/2024    EOSRELPCT 2.1 07/02/2024    BASORELPCT 0.8 07/02/2024    AUTOIGPER 0.5 07/02/2024    NEUTROABS 7.84 (H) 07/02/2024    LYMPHSABS 1.41 07/02/2024    MONOSABS 0.74 07/02/2024    EOSABS 0.22 07/02/2024    BASOSABS 0.08 07/02/2024    AUTOIGNUM 0.05 07/02/2024    NRBC 0.0 07/02/2024     Drug-drug interactions  Completed medication reconciliation today to assess for drug interactions. Patient denies starting or stopping any medications.    Assessed medication list for interactions, no significant drug interactions noted.   Advised patient to call the clinic if any new medications are started so we can assess for drug-drug interactions.  Drug-food interactions discussed:  none  Vaccines are coordinated by the patient's oncologist and primary care provider.    Allergies  Known allergies and reactions were discussed with the patient. The patient's chart has been reviewed for allergy information and updated as necessary.   Allergies   Allergen Reactions    Hydrocodone Nausea Only    Morphine And Codeine Nausea And Vomiting        Hospitalizations and Urgent Care Visits Since Last Assessment:  Unplanned hospitalizations or inpatient admissions: no  ED Visits: no  Urgent Office Visits: no    Adherence Assessment:  Adherence Questions  Linked Medication(s) Assessed: Capecitabine  On average, how many doses/injections does the patient miss per month?: 0  What are the identified reasons for non-adherence or missed doses? : no problems identified  What is the estimated medication adherence level?: %  Based on the patient/caregiver response and refill history, does this patient require an MTP to track adherence improvements?: no    Quality of Life Assessment:  Quality of Life Assessment  Quality of Life Improvement Scale: 5-No change  -- Quality of Life:  7/10    Financial Assessment:  Medication availability/affordability: Patient has had no issues obtaining medication from pharmacy.    Attestation     I attest the patient was actively involved in and has agreed to the above plan of care.  If the prescribed therapy is at any point deemed not appropriate based on the current or future assessments, a consultation will be initiated with the patient's specialty care provider to determine the best course of action. The revised plan of therapy will be documented along with any required assessments and/or additional patient education provided.       All questions addressed and patient had no additional concerns. Patient has pharmacy contact information.    Name/Credentials: Lubna Gupta PharmD, BCPS    7/2/2024  12:29 EDT

## 2024-07-02 NOTE — PROGRESS NOTES
Behavioral Oncology Services  Video visit conducted via SureVisit Video Visit  You have chosen to receive care through a telehealth visit.  Do you consent to use a video/audio connection for your medical care today? YES  Telehealth provided in patient's home.  Location of Provider: [place], [place]     Subjective  Patient ID: Martha Davey is a 63 y.o. female is seen via telehealth in the Behavioral Oncology Clinic.    CC: Depression, grief    HPI/ Interval History:   Pt is seen in follow up regarding ongoing depression and anxiety, acute grief, alongside continued survivorship of metastatic breast cancer, acute grief surrounding recent loss of spouse. Appreciates general stability of scans and cancer care. Notes specific triggering of today's visit with Dr. Hancock, stating this was the first time she had seen him since Aki passed away. Appreciated friend who went with her, continuing to balance challenges asking for help and attempting to accept offered support. Sons are doing well.    Continues to work around home, going through things and appreciating progress. Concentration is acceptable. Continues to interact with others and get outside as able. Unfortunately, foot has been hurting which interferes. Remains engaged with various medical professionals.    Pt continues to sleep well, apprecaiting reduced anxiety with addition of lorazepam. Is waking up well with improved energy and engagement. Is getting up more regularly, engaging often.    Exam: As above    Recent Labs Reviewed:  Office Visit on 07/02/2024   Component Date Value    Color, UA 07/02/2024 Yellow     Appearance, UA 07/02/2024 Cloudy (A)     pH, UA 07/02/2024 6.0     Specific Gravity, UA 07/02/2024 1.025     Glucose, UA 07/02/2024 Negative     Ketones, UA 07/02/2024 Negative     Bilirubin, UA 07/02/2024 Negative     Blood, UA 07/02/2024 Moderate (2+) (A)     Protein, UA 07/02/2024 100 mg/dL (2+) (A)     Leuk Esterase, UA 07/02/2024 Large (3+)  (A)     Nitrite, UA 07/02/2024 Positive (A)     Urobilinogen, UA 07/02/2024 0.2 E.U./dL     RBC, UA 07/02/2024 3-5 (A)     WBC, UA 07/02/2024 21-50 (A)     Bacteria, UA 07/02/2024 3+ (A)     Squamous Epithelial Cell* 07/02/2024 0-3     Methodology 07/02/2024 Manual Light Microscopy    Lab on 07/02/2024   Component Date Value    Glucose 07/02/2024 116 (H)     BUN 07/02/2024 25 (H)     Creatinine 07/02/2024 0.99     Sodium 07/02/2024 145     Potassium 07/02/2024 4.6     Chloride 07/02/2024 104     CO2 07/02/2024 28.7     Calcium 07/02/2024 9.8     Total Protein 07/02/2024 6.6     Albumin 07/02/2024 3.9     ALT (SGPT) 07/02/2024 6     AST (SGOT) 07/02/2024 25     Alkaline Phosphatase 07/02/2024 71     Total Bilirubin 07/02/2024 0.3     Globulin 07/02/2024 2.7     A/G Ratio 07/02/2024 1.4     BUN/Creatinine Ratio 07/02/2024 25.3 (H)     Anion Gap 07/02/2024 12.3     eGFR 07/02/2024 64.2     Magnesium 07/02/2024 2.3     Phosphorus 07/02/2024 4.3     WBC 07/02/2024 10.34     RBC 07/02/2024 4.08     Hemoglobin 07/02/2024 12.9     Hematocrit 07/02/2024 39.6     MCV 07/02/2024 97.1 (H)     MCH 07/02/2024 31.6     MCHC 07/02/2024 32.6     RDW 07/02/2024 16.9 (H)     RDW-SD 07/02/2024 58.1 (H)     MPV 07/02/2024 10.0     Platelets 07/02/2024 310     Neutrophil % 07/02/2024 75.8     Lymphocyte % 07/02/2024 13.6 (L)     Monocyte % 07/02/2024 7.2     Eosinophil % 07/02/2024 2.1     Basophil % 07/02/2024 0.8     Immature Grans % 07/02/2024 0.5     Neutrophils, Absolute 07/02/2024 7.84 (H)     Lymphocytes, Absolute 07/02/2024 1.41     Monocytes, Absolute 07/02/2024 0.74     Eosinophils, Absolute 07/02/2024 0.22     Basophils, Absolute 07/02/2024 0.08     Immature Grans, Absolute 07/02/2024 0.05     nRBC 07/02/2024 0.0    Hospital Outpatient Visit on 06/21/2024   Component Date Value    Creatinine 06/21/2024 1.00    Office Visit on 05/06/2024   Component Date Value    Color, UA 05/06/2024 Yellow     Appearance, UA 05/06/2024 Cloudy  (A)     pH, UA 05/06/2024 6.0     Specific Gravity, UA 05/06/2024 1.010     Glucose, UA 05/06/2024 Negative     Ketones, UA 05/06/2024 Negative     Bilirubin, UA 05/06/2024 Negative     Blood, UA 05/06/2024 Moderate (2+) (A)     Protein, UA 05/06/2024 Negative     Leuk Esterase, UA 05/06/2024 Large (3+) (A)     Nitrite, UA 05/06/2024 Positive (A)     Urobilinogen, UA 05/06/2024 0.2 E.U./dL     RBC, UA 05/06/2024 11-20 (A)     WBC, UA 05/06/2024 11-20 (A)     Bacteria, UA 05/06/2024 1+ (A)     Squamous Epithelial Cell* 05/06/2024 7-12 (A)     Hyaline Casts, UA 05/06/2024 None Seen     Methodology 05/06/2024 Manual Light Microscopy     Urine Culture 05/06/2024 >100,000 CFU/mL Escherichia coli (A)    Lab on 05/06/2024   Component Date Value    Glucose 05/06/2024 128 (H)     BUN 05/06/2024 19     Creatinine 05/06/2024 0.98     Sodium 05/06/2024 142     Potassium 05/06/2024 4.8     Chloride 05/06/2024 99     CO2 05/06/2024 30.2 (H)     Calcium 05/06/2024 10.1     Total Protein 05/06/2024 6.8     Albumin 05/06/2024 4.0     ALT (SGPT) 05/06/2024 12     AST (SGOT) 05/06/2024 22     Alkaline Phosphatase 05/06/2024 67     Total Bilirubin 05/06/2024 0.3     Globulin 05/06/2024 2.8     A/G Ratio 05/06/2024 1.4     BUN/Creatinine Ratio 05/06/2024 19.4     Anion Gap 05/06/2024 12.8     eGFR 05/06/2024 65.0     WBC 05/06/2024 8.59     RBC 05/06/2024 3.81     Hemoglobin 05/06/2024 12.3     Hematocrit 05/06/2024 37.8     MCV 05/06/2024 99.2 (H)     MCH 05/06/2024 32.3     MCHC 05/06/2024 32.5     RDW 05/06/2024 16.3 (H)     RDW-SD 05/06/2024 59.5 (H)     MPV 05/06/2024 9.4     Platelets 05/06/2024 299     Neutrophil % 05/06/2024 75.6     Lymphocyte % 05/06/2024 13.6 (L)     Monocyte % 05/06/2024 8.5     Eosinophil % 05/06/2024 1.6     Basophil % 05/06/2024 0.5     Immature Grans % 05/06/2024 0.2     Neutrophils, Absolute 05/06/2024 6.49     Lymphocytes, Absolute 05/06/2024 1.17     Monocytes, Absolute 05/06/2024 0.73      Eosinophils, Absolute 05/06/2024 0.14     Basophils, Absolute 05/06/2024 0.04     Immature Grans, Absolute 05/06/2024 0.02     nRBC 05/06/2024 0.0    Labs reviewed    Current Psychotropic Medications:  Duloxetine 60 mg daily  Lorazepam 0.5 mg q hs  Gabapentin 300 mg tid  Modafinil 200 mg q AM    Plan of Care/ Medical Decision Making  Continue medications as written.  Ongoing therapy surrounding grief and loss provided; considered importance of ongoing engagement, evaluated alternate styles of grief, and supported allowance of others to support current needs.  FU scheduled in 4 weeks.    Diagnoses and all orders for this visit:    1. Major depressive disorder, recurrent episode, moderate (Primary)    2. Generalized anxiety disorder    3. Neoplastic malignant related fatigue    4. Grief    I spent 37 minutes caring for Martha on this date of service. This time includes time spent by me in the following activities: preparing for the visit, reviewing tests, obtaining and/or reviewing a separately obtained history, performing a medically appropriate examination and/or evaluation, counseling and educating the patient/family/caregiver, ordering medications, tests, or procedures, and documenting information in the medical record.

## 2024-07-03 ENCOUNTER — TELEPHONE (OUTPATIENT)
Dept: ONCOLOGY | Facility: CLINIC | Age: 64
End: 2024-07-03
Payer: MEDICARE

## 2024-07-03 ENCOUNTER — SPECIALTY PHARMACY (OUTPATIENT)
Dept: PHARMACY | Facility: HOSPITAL | Age: 64
End: 2024-07-03
Payer: MEDICARE

## 2024-07-03 RX ORDER — NITROFURANTOIN 25; 75 MG/1; MG/1
100 CAPSULE ORAL 2 TIMES DAILY
Qty: 14 CAPSULE | Refills: 0 | Status: SHIPPED | OUTPATIENT
Start: 2024-07-03 | End: 2024-07-10

## 2024-07-03 NOTE — TELEPHONE ENCOUNTER
Provider: Dr. Hancock  Caller: Martha Davey  Relationship to Patient: Self  Call Back Phone Number: [phone]  Reason for Call: Pt stated she had a urine lab run and has not heard about the results yet. Please call to go over results and Rx information.

## 2024-07-03 NOTE — TELEPHONE ENCOUNTER
Reviewed patient's urine culture results with Dr. Hancock. Culture positive for E. coli. Per Dr. Hancock, we will send in a prescription for Macrobid 100 mg BID x 7 days. Phoned patient to inform her of this and to verify correct pharmacy. Patient v/u.

## 2024-07-03 NOTE — PROGRESS NOTES
Specialty Pharmacy Note: Xeloda (capecitabine)    Martha Davey is a 63 y.o. female with breast cancer was seen 7/2/24 by Dr. Hancock. Per provider dictation, no changes to oral oncology regimen Xeloda (capecitabine).  Labs Review: The CMP and CBC from 7/2/24 have been reviewed. No dose adjustments are needed for the oral specialty medication(s) based on the labs.    Specialty pharmacy will continue to follow patient.    Gage Gonsales, Es, [facility]  Clinical Oncology Pharmacist    7/3/2024  10:55 EDT

## 2024-07-04 LAB — BACTERIA SPEC AEROBE CULT: ABNORMAL

## 2024-07-09 DIAGNOSIS — Z17.1 MALIGNANT NEOPLASM OF OVERLAPPING SITES OF RIGHT BREAST IN FEMALE, ESTROGEN RECEPTOR NEGATIVE: Primary | ICD-10-CM

## 2024-07-09 DIAGNOSIS — C50.811 MALIGNANT NEOPLASM OF OVERLAPPING SITES OF RIGHT BREAST IN FEMALE, ESTROGEN RECEPTOR NEGATIVE: Primary | ICD-10-CM

## 2024-07-16 RX ORDER — OXYCODONE HYDROCHLORIDE AND ACETAMINOPHEN 5; 325 MG/1; MG/1
1 TABLET ORAL EVERY 4 HOURS PRN
Qty: 60 TABLET | Refills: 0 | Status: SHIPPED | OUTPATIENT
Start: 2024-07-16

## 2024-07-16 NOTE — TELEPHONE ENCOUNTER
Caller: Martha Davey    Relationship: Self    Best call back number: [phone]    Requested Prescriptions:   Requested Prescriptions     Pending Prescriptions Disp Refills    oxyCODONE-acetaminophen (PERCOCET) 5-325 MG per tablet 60 tablet 0     Sig: Take 1 tablet by mouth Every 4 (Four) Hours As Needed for Moderate Pain.        Pharmacy where request should be sent: [facility] [id number] [address] [phone] [facility] [phone]      Last office visit with prescribing clinician: 7/2/2024   Last telemedicine visit with prescribing clinician: Visit date not found   Next office visit with prescribing clinician: 9/30/2024     Additional details provided by patient:     Does the patient have less than a 3 day supply:  [] Yes  [x] No    Would you like a call back once the refill request has been completed: [] Yes [x] No    If the office needs to give you a call back, can they leave a voicemail: [x] Yes [] No    Kait Vazquez   07/16/24 14:42 EDT

## 2024-07-30 ENCOUNTER — TELEMEDICINE (OUTPATIENT)
Dept: PSYCHIATRY | Facility: HOSPITAL | Age: 64
End: 2024-07-30
Payer: MEDICARE

## 2024-07-30 DIAGNOSIS — F41.1 GENERALIZED ANXIETY DISORDER: ICD-10-CM

## 2024-07-30 DIAGNOSIS — R53.0 NEOPLASTIC MALIGNANT RELATED FATIGUE: ICD-10-CM

## 2024-07-30 DIAGNOSIS — F33.1 MAJOR DEPRESSIVE DISORDER, RECURRENT EPISODE, MODERATE: ICD-10-CM

## 2024-07-30 PROCEDURE — 99214 OFFICE O/P EST MOD 30 MIN: CPT | Performed by: NURSE PRACTITIONER

## 2024-07-30 PROCEDURE — 1160F RVW MEDS BY RX/DR IN RCRD: CPT | Performed by: NURSE PRACTITIONER

## 2024-07-30 PROCEDURE — 1159F MED LIST DOCD IN RCRD: CPT | Performed by: NURSE PRACTITIONER

## 2024-07-30 RX ORDER — MODAFINIL 200 MG/1
200 TABLET ORAL DAILY
Qty: 30 TABLET | Refills: 2 | Status: SHIPPED | OUTPATIENT
Start: 2024-07-30

## 2024-07-30 RX ORDER — LORAZEPAM 0.5 MG/1
0.5 TABLET ORAL NIGHTLY PRN
Qty: 15 TABLET | Refills: 0 | Status: SHIPPED | OUTPATIENT
Start: 2024-07-30

## 2024-07-30 NOTE — PROGRESS NOTES
Behavioral Oncology Services  Video visit conducted via [employer] Video Visit  You have chosen to receive care through a telehealth visit.  Do you consent to use a video/audio connection for your medical care today? YES  Telehealth provided in patient's home.  Location of Provider: [place], [place]     Subjective  Patient ID: Martha Davey is a 63 y.o. female is seen via telehealth in the Behavioral Oncology Clinic.    CC: Depression    HPI/ Interval History:   Pt is seen in follow up regarding ongoing experience of grief and loss in aftermath of 's death. Reports worsening sx of depression, specifically noting she is staying ni bed until 12-1 PM most days, and recognizing this is not normal for her.    Sleep reviewed at length; reports this as being difficult to initiate due to ruminative worry. Ran out of ativan 0.5 mg q hs, feeling these had been helpful, although out over the past four days. When taking, was able to fall asleep more easily. Going to bed 12-12:30. Occasionally continuous, other times fragmented. Getting out of bed between 11:30-1 PM. Pt states healthy sleep routine is from 12-9:30 AM. Pt has been diagnosed with MARIA M, although struggles to tolerate this. Has met with sleep med team with good fit, although endorses ongoing ambivilence.    Once up, takes modafinil, drinks coffee, watches TV. Is getting out of the house appx 4 times weekly for appointments, etc. Does report regularly showering and dressing. Continues to cook dinner regularly, eating well. Has not yet been back to [employer] but again mentions this as personal goal.    Is planning for trip on Friday, looking forward to [place] with friends. Is nervous about impact of foot injury, adjusted stamina. Plans to participate as able.     Continues to process adjusted normal and is participating biweekly in grief therapy. Identifies goals of being happy, reporting glimpses of this while endorsing ongoing sadness, grief.     Pt  reports minimal alcohol use.    Exam: As above    Current Psychotropic Medications:  Duloxetine 60 mg daily  Modafinil 200 mg q AM    Plan of Care/ Medical Decision Making  Reviewed modafinil; advised patient not to take after 10 AM given potential for activation, sleep disruption. Considered goal sleep scheduled 12-9:30 AM, reviewing strategies for waking earlier.  Discussed over sleeping, importance of wearing CPAP, modifying sleep routine. Will reduce ativan to 0.5 mg PRN vs nightly, planning for short term use, ultimate discontinuation of this in near future.   FU scheduled in 4 weeks.    Diagnoses and all orders for this visit:    1. Generalized anxiety disorder  -     LORazepam (Ativan) 0.5 MG tablet; Take 1 tablet by mouth At Night As Needed for Anxiety.  Dispense: 15 tablet; Refill: 0    2. Major depressive disorder, recurrent episode, moderate  -     modafinil (PROVIGIL) 200 MG tablet; Take 1 tablet by mouth Daily.  Dispense: 30 tablet; Refill: 2    3. Neoplastic malignant related fatigue  -     modafinil (PROVIGIL) 200 MG tablet; Take 1 tablet by mouth Daily.  Dispense: 30 tablet; Refill: 2    I spent 36 minutes caring for Martha on this date of service. This time includes time spent by me in the following activities: preparing for the visit, reviewing tests, obtaining and/or reviewing a separately obtained history, performing a medically appropriate examination and/or evaluation, counseling and educating the patient/family/caregiver, ordering medications, tests, or procedures, and documenting information in the medical record.

## 2024-08-01 ENCOUNTER — TELEPHONE (OUTPATIENT)
Dept: ONCOLOGY | Facility: CLINIC | Age: 64
End: 2024-08-01
Payer: MEDICARE

## 2024-08-01 NOTE — TELEPHONE ENCOUNTER
----- Message from Priti PAIGE sent at 8/1/2024  8:39 AM EDT -----  Regarding: FW: I will be out of town on Aug 13 and need to ita 2 appts on that day.  Contact: [phone]    ----- Message -----  From: Martha Davey  Sent: 8/1/2024   8:16 AM EDT  To: [facility]  Subject: I will be out of town on Aug 13 and need to #    I have 2 appts scheduled at [facility] at 12>30 for labs.  Then a   I see the nurse practitioner after Labs,  I will be back in town Aug. 14th,  I need to reschedule for the 13th appts,  Thanks again,    Martha Davey  [phone]

## 2024-08-05 RX ORDER — ROSUVASTATIN CALCIUM 5 MG/1
5 TABLET, COATED ORAL DAILY
Qty: 90 TABLET | Refills: 3 | Status: SHIPPED | OUTPATIENT
Start: 2024-08-05

## 2024-08-05 RX ORDER — PANTOPRAZOLE SODIUM 40 MG/1
40 TABLET, DELAYED RELEASE ORAL DAILY
Qty: 90 TABLET | Refills: 3 | Status: SHIPPED | OUTPATIENT
Start: 2024-08-05

## 2024-08-15 NOTE — PROGRESS NOTES
"Chief Complaint  Previous Stage Ib ([id number]) ER/AK positive, HER-2/peter negative right breast cancer with subsequent metastatic disease identified 10/8/2017, history of right pulmonary embolism, cancer related pain, chemotherapy-induced diarrhea, chemotherapy-induced mucositis, chemotherapy-induced hand-foot syndrome.     Subjective        History of Present Illness  She presents today for follow up on the above diagnosis.  She continues on Xeloda 1000 mg in the morning, 1500 mg in the evening 7 days on, 7 days off.  She overall tolerates a little very well.  She continues to have dryness of her skin utilizing emollient creams.  She was recently in [place] and was stung by a bee on her right forearm. This area is red and warm, but she reports she has been stung by bees in the past and has experienced the same reaction. She is taking zyrtec and OTC hydrocortisone. When she was here last she was having dysuria and back pain and found to have another UTI. She completed course of ciprofloxacin and symptoms had resolved at that time, however, her symptoms have returned. She otherwise does not have any new concerns at this time.       Objective   Vital Signs:   /82   Pulse 94   Temp 97.5 °F (36.4 °C) (Oral)   Resp 16   Ht 154 cm (60.63\")   Wt 82.4 kg (181 lb 9.6 oz)   SpO2 97%   BMI 34.73 kg/m²     Physical Exam  Constitutional:       Appearance: She is well-developed.      Comments: Ambulates with cane     Eyes:      Conjunctiva/sclera: Conjunctivae normal.   Neck:      Thyroid: No thyromegaly.   Cardiovascular:      Rate and Rhythm: Normal rate and regular rhythm.   Pulmonary:      Effort: No respiratory distress.      Breath sounds: Normal breath sounds.   Abdominal:      General: Bowel sounds are normal.      Palpations: Abdomen is soft.   Musculoskeletal:      Comments: Braces in place in the bilateral lower extremities   Lymphadenopathy:      Head:      Right side of head: No " submandibular adenopathy.      Upper Body:      Right upper body: No supraclavicular adenopathy.      Left upper body: No supraclavicular adenopathy.   Skin:     General: Skin is warm and dry.      Findings: Erythema (right forearm) present.      Comments: Localized redness and swelling to right forearm    Neurological:      Mental Status: She is alert and oriented to person, place, and time.      Motor: No abnormal muscle tone.   Psychiatric:         Behavior: Behavior normal.       Result Review :  Results from last 7 days   Lab Units 08/16/24  1327   WBC 10*3/mm3 9.28   NEUTROS ABS 10*3/mm3 6.81   HEMOGLOBIN g/dL 13.0   HEMATOCRIT % 40.2   PLATELETS 10*3/mm3 261     Results from last 7 days   Lab Units 08/16/24  1327   SODIUM mmol/L 140   POTASSIUM mmol/L 3.9   CHLORIDE mmol/L 100   CO2 mmol/L 29.9*   BUN mg/dL 24*   CREATININE mg/dL 1.02*   CALCIUM mg/dL 9.5   ALBUMIN g/dL 3.9   BILIRUBIN mg/dL 0.4   ALK PHOS U/L 81   ALT (SGPT) U/L 14   AST (SGOT) U/L 27   GLUCOSE mg/dL 102*         Urinalysis With Culture If Indicated - Urine, Clean Catch (08/16/2024 14:34)        Assessment and Plan     *Previous Stage Ib ([id number]) right breast cancer:  Diagnosed May 2010 with excisional biopsy for invasive ductal carcinoma, 1.3 cm, grade 2, ER 90%, NV 80%, HER-2/peter negative (1+ IHC).    Subsequent right mastectomy in July 2010 with no residual breast malignancy, 1/5 sentinel lymph node with micrometastasis (0.25 mm).    Treated in the [facility] with adjuvant AC ×4 cycles in 2010 (no taxanes administered due to underlying Charcot-Saloni-Tooth with peripheral neuropathy).    Adjuvant Femara (postmenopausal) initiated October 2010 with plan to treat ×10 years.    Genetic testing reportedly negative.    Developed osteopenia treated with Prolia beginning 2/27/13. Subsequently discontinued due to identification of metastatic disease.    *Recurrent/metastatic disease identified 10/8/17:  Disease involving thoracic spine  with cord compression at T6, lumbosacral involvement, sternal and right sternoclavicular involvement.    Femara discontinued in 10/2017.    Radiation administered (in the [facility] system) to the thoracic spine beginning 10/19/17 treating T3-T9 to a dose of 24 gray in 6 fractions.  Evaluation with MRI 12/8/17 showing persistent T6 cord compression with persistent neurologic compromise requiring surgical treatment 12/11/17 with T6 laminectomy/corpectomy and T3-T9 fusion.  Pathology with metastatic carcinoma of breast origin, ER negative, SD negative, HER-2/peter negative (1+ IHC).  Additional staging evaluation 12/8/17 with no evidence of visceral metastatic disease, bone scan showing involvement of thoracic spine, sternum, left humerus, mid frontal bone.  No plane film correlate of left humerus lesion.  MRI lumbar spine with small intradural L3 metastasis.  CA 15-3 12/6/17- 17.  Palliative radiation therapy to L3 dural metastasis and left humerus initiated 1/15/18 (10 fractions), completed 1/26/18.  Hypercalcemia of malignancy with calcium in the 10-11 range.  Initiation of monthly Xgeva 1/23/18.  Baseline CT scan 1/30/18 with no evidence of visceral involvement.  Cluster of nodular opacities in the right lower lobe suspected to be infectious or related to bronchiolitis. Bone scan 1/30/18 showed postsurgical change in the thoracic spine, stable uptake in the frontal bone, no new areas of disease.  Initiation of palliative oral single agent Xeloda 2/7/18 2000 mg a.m., 1500 mg p.m. for 14/21 days.   Following 3 cycles xeloda, bone scan 4/4/18 showed no change from the prior study.  CT scan 4/4/18 showed a small pericardial effusion of unclear significance as well as a subcutaneous nodule in the right lateral chest wall.  Subsequent evaluation with echocardiogram 4/17/18 showed no evidence of pericardial effusion.  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  We discussed the possibility of surgical excision of the right subcutaneous chest wall lesion for more definitive diagnosis.  Reviewed previous CT images dating back to 12/8/17 and the lesion was present even at that time measuring around 1.7 cm although not commented on in the radiology report.  As this appears to be an indolent low-grade process unrelated to her breast cancer, recommendee foregoing surgical excision at this time and monitoring this area on future scans.  The patient agreed.    Following 6 cycles of Xeloda, CT 6/6/18  showed stable findings, no evidence of progressive disease.  There was a comment regarding subcutaneous abnormality in the anterior abdominal wall and this was related to Lovenox injection sites.  Bone scan 6/6/18 showed no interval change.   CT scan and bone scan 8/13/18 following 9 cycles of Xeloda showed stable findings with no evidence of significant visceral metastases.  Her bone lesions appear stable on bone scan.  The spindle cell neoplasm in her right chest wall actually decreased in size from 2 cm down to 1.6 cm.    The patient experienced some symptoms of diarrhea, anorexia, generalized weakness during cycle 9 Xeloda it was unclear whether this was related to a viral gastroenteritis or toxicity from treatment.  Symptoms recurred during cycle 10 and treatment was cut short by 2 days.  Symptoms attributed to Xeloda.  With cycle 11, dose and schedule altered to 1500 mg twice daily for 7 days on followed by 7 days off .  CT scan 9/9/2020 with no significant changes.  Bone scan 9/9/2020 read as unchanged from prior studies however did note an area of slight activity in the medial left femur.  Contacted radiology and although this was not noted on prior reports appears to have been present.  Subsequent MRI left femur 9/21/2020 with cortical thickening and periosteal edema left iliac us muscle  insertion to the medial left femur with no evidence of metastatic disease, favored to represent periosteal change secondary to insertional tendinitis.  Severe hand-foot symptoms causing sclerodactyly and limitation in finger movement prompted change in dosing in July 2021 with Xeloda decreased to 1000 mg a.m., 1500 mg p.m. for 7 days on followed by 7 days off.  Patient was experiencing severe hand-foot syndrome related to Xeloda having developed skin peeling, sclerodactyly with limited use of her hands.  On 3/31/2022, Xeloda was held to allow for recovery.  Patient resumed Xeloda on 4/18/2022.  Patient required hospitalization 5/29 - 6/1/2022 with presumed viral gastroenteritis that was exacerbated by Xeloda.  Xeloda held briefly, resumed on 6/6/2022.  Patient again with worsening sclerodactyly, Xeloda held for 2 weeks from 10/3 - 10/17/2022, resumed at prior dose with improvement in symptoms.  Scans on 3/3/2023 with CT chest abdomen pelvis and bone scan showing stable findings.  CT chest abdomen pelvis 7/3/2023 with questionable 1 mm change in size right lower lobe nodule.  Additional small pulmonary nodules stable.  Stable bony metastatic disease.  Bone scan on 7/7/2023 however showed slight progression focal involvement right ischium and superior pubic ramus (new ischial lesion superior pubic ramus compared to 10/24/2022 and more conspicuous compared to 3/3/2023).  Metastatic lesion right inferior pubic ramus and ischial tuberosity increased in size since October 2022 however stable from 3/3/2023.  MRI cervical spine 7/3/2023 with no metastatic involvement however identification of the lesion at T2, recommended dedicated thoracic spine MRI to evaluate further.  Given the minimal extent and slow degree of progression, elected to continue oral Xeloda and evaluate further with MRI pelvis and thoracic spine.  Consideration of biopsy pelvic metastasis for repeat ER/WA/HER2/peter testing.    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  CA 15-3 on 7/10/2023 was 12.2, uninformative.  MRI thoracic spine 7/27/2023 with 1 cm metastatic focus seen in L1  MRI pelvis 7/27/2023 with metastatic foci identified right superior pubic ramus, right ischial tuberosity, inferior pubic ramus, L5 body.  Chronic appearing posttraumatic and/or degenerative change in the posterior right ilium adjacent SI joint.  CT-guided biopsy right pubic ramus lesion on 8/31/2023.  Pathology showed no evidence of malignancy, showed markedly calcified and sclerotic mature lamellar bone.  Attempted decalcification but no evidence of malignancy.  CT chest abdomen pelvis 9/8/2023 and bone scan 9/11/2023 with stable findings.  CT chest abdomen pelvis 12/8/2023 with possible slight increase in right chest wall subcutaneous lesion (1.8 x 1.1 up to 1.8 x 1.4 cm) although may be related to altered positioning.  Nonpathologically enlarged right axillary and subpectoral lymph nodes slightly increased (patient notes she received RSV vaccine right arm just prior to scan).  Bone scan 12/11/2023 with stable findings.  CT chest abdomen pelvis 3/19/2024 with slight interval decrease in size of right axillary and bilateral subpectoral lymph nodes, right subcutaneous soft tissue nodule slightly smaller, stable pulmonary nodules, stable bone metastases.  Bone scan 3/19/2024 with stable findings.  CT chest abdomen pelvis 6/21/2024 showed left subpectoral lymph node to have increased slightly in size (0.9 x 0.6 up to 1.3 x 0.8 cm).  Subcutaneous nodule right chest wall decreased from 1.1 x 1.9 down to 1.1 x 1.4 cm.  Scan otherwise stable.  Bone scan 6/21/2024 with stable findings  The patient returns today continuing on treatment with Xeloda 1000 mg a.m., 1500 mg p.m. 7 days on followed by 7 days off.  She continues Xgeva every 3 months, last given 7/2/2024.  She will return in 6 weeks for follow-up with Dr. Hancock with CBC,  CMP, mag, Phos, Xgeva with CT scans prior to MD follow-up.    *Right pulmonary embolism:  Diagnosed on CT angiogram 10/21/17 involving small right lower lobe pulmonary artery.  Lower extremity Dopplers negative.  Bilateral lower extremity Dopplers negative again 12/5/17.  Received chronic Lovenox 1 mg/kg twice per day, transitioned to oral Eliquis in February 2019, continuing on Eliquis 5 mg twice daily.  Patient continues on Eliquis 5 mg twice daily    *Cancer related pain:  Previously receiving Duragesic 50 µg patch every 72 hours along with Dilaudid 4 mg as needed for breakthrough pain  The patient's pain improved over time and she was able to discontinue both Duragesic and Dilaudid in the interval.  Patient does take occasional Flexeril at bedtime due to back spasm/pain when she has been more active.  The patient does have some occasional aggravation of her chronic back pain and does use tramadol 50 mg every 8 hours as needed  Patient was receiving tramadol 50 mg every 8-12 hours as needed in addition to hydrocodone 5/325 every 4 hours as needed.  She was also taking OTC NSAIDs.  Patient developed nausea related to hydrocodone and was transitioned to Percocet 5/325 every 4 hours as needed, advised to stop taking NSAIDs with ongoing anticoagulation.  Patient notes pain in her shoulders that does wax and wane is unrelated to her malignancy.  She does continue on tramadol 50 mg every 8 hours and Percocet 5/325 every 4 hours as needed    *Chemotherapy-induced diarrhea with subsequent C. difficile colitis in the setting of previous ulcerative colitis and then identification of enteropathogenic E. coli:  Patient with reported history of ulcerative colitis, continues on mesalamine.  The patient developed initial diarrhea related to Xeloda at regular dosing.  Symptoms improved on reduced dose Xeloda  Flare of symptoms in October 2018 with apparent finding of C. difficile colitis by GI, treated with course of oral  vancomycin with improvement in symptoms.  Patient notes minimal intermittent diarrhea/loose stools on Xeloda requiring occasional dosing of Imodium.    Patient required hospitalization 5/29 - 6/1/2022 with presumed viral gastroenteritis that was exacerbated by Xeloda.  Xeloda held briefly, resumed on 6/6/2022.  Patient's  developed C. difficile colitis and patient was experiencing increase in diarrhea.  Stool studies performed 8/4/22 negative C. difficile however GI PCR was positive for enteropathogenic E. coli.  Given patient's symptoms and immunocompromise status it was elected to treat her with azithromycin 500 mg daily x3 days beginning 8/5/2022  Diarrhea resolved  Patient underwent colonoscopy on 2/28/2023 with findings of diverticulosis  Patient notes that bowel function has been stable recently    *Traumatic left tibia/fibular fracture:  Status post ORIF 12/6/17  Specimen was sent for pathologic review, negative for evidence of malignancy    *Hypercalcemia:  Suspect hypercalcemia of malignancy, calcium in  10-11 range previously.  Calcium normalized following initiation of monthly Xgeva on 1/23/18.  8/16/2024: 9.5    *Chemotherapy-induced mucositis:  Patient has not experienced any recent difficulty with mucositis.    *Recurrent UTI, bladder wall thickening on CT:  Patient had an enterococcal UTI on 3/2/18 sensitive to nitrofurantoin and received treatment, unclear how long.  Recurrent UTI 3/20/18 with urine culture growing Klebsiella, initially treated with nitrofurantoin, transitioned to Levaquin.  CT 4/4/18 with diffuse bladder wall thickening with increased nodular thickening at the left base.  Referral to urogynecology Dr. May Johnson.  She was placed on a prophylactic dose of trimethoprim 100 mg daily, bladder wall thickening felt to be related to recent recurrent urinary tract infections.  Patient with urinary symptoms, treated with course of Macrobid at the end of December 2018, urine culture  however was negative 12/31/18.  Patient was found to have Klebsiella UTI 7/29/2019 which was successfully treated with Macrobid with complete resolution of symptoms.  CT 8/10/2021 with diffuse bladder wall thickening new from 5/17/2021 (however seen on multiple prior scans).    UTI on 10/18/2021 with E. coli greater than 100,000 colonies that was pansensitive, treated with Macrobid x7 days  With ongoing/recurrent UTIs patient was seen by urogynecology and initiated suppressive therapy with trimethoprim 100 mg daily in December 2021.  She has not experienced any further urinary tract infections.  CT abdomen pelvis 3/28/2022 does show diffuse thickening of urinary bladder as has been seen on prior studies indicative of cystitis.  Patient notes that she did stop taking trimethoprim on a daily basis.    Patient with urine culture on 5/5/2023 that grew E. coli and she received antibiotic treatment from urogynecology.    Urine culture on 8/23/2023 with greater than 100,000 colonies of E. coli resistant to ampicillin and Bactrim.  Received 5-day course of Cipro with resolution of symptoms.  Patient did have UTI with E. coli on culture 10/2/2023, received a course of Augmentin.  Patient with dysuria again today and we will check urinalysis and urine culture with potential need for antibiotics pending results.  8/16/2024: Dysuria and back pain has returned since her completion of Cipro.  advised to follow-up with urogynecology who she has previously seen.urinalysis noting 3+ blood, 2+ protein, 3+ leukocytes and positive nitrates.  Urine culture is pending, we  will prescribe ciprofloxacin for coverage until culture results.    *Charcot-Saloni-Tooth with mobility difficulties:  The patient underwent an intensive course of rehabilitation at [facility].  She graduated from her outpatient course November 2018.  Overall the patient has improved dramatically in terms of mobility,   Patient developed significant callus formation  lateral aspect of the left plantar surface.  She continues follow-up her brace specialist and her podiatrist Dr. Ware.    *Depression:  The patient is continuing follow-up in the supportive oncology clinic and is currently continuing on Cymbalta 30 mg twice daily as well as gabapentin 600 mg nightly, temazepam 15 mg nightly as needed, Provigil 100 mg daily.  Patient does continue to attend support groups at [facility].  The patient does continue routine follow-up in supportive oncology clinic.    Patient does have multiple stressors with her 's malignancy and chemotherapy as well as her autistic son who is living with them at home.  As above, patient returns today for the first time after her  passed away who was a patient in our practice.  She is obviously upset, tearful today.  She is accompanied by a friend in the office today.  Her sons provide support at home.  She is participating in the counseling with hospice which has been helpful.  Support was provided today.    *Hand-foot syndrome secondary to Xeloda:  Patient continues with frequent application of emollient cream to the hands and feet  Symptoms increased significantly requiring a 1 week delay in cycle 18 Xeloda as noted above.  Symptoms did improve and she continued on the same dose.    Patient was referred to dermatology and has been continuing on triamcinolone 0.1% cream used for 1 week on followed by 1 week off which led to some further improvement.   Progressive palmar erythema with development of sclerodactyly and effect on dexterity.  Addition of urea-based cream 3 times daily.  Patient with continued difficulty regarding erythema of her hands that extended onto the dorsal aspect and was causing contractures/sclerodactyly in her fingers affecting her dexterity.  In July 2021, held Xeloda for an additional week and then reduced dose from 1500 mg twice daily down to 1000 mg a.m. and 1500 mg p.m. and continued on a 7-day on followed  by 7-day off schedule.  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  On 3/31/2022, Xeloda was held to allow for recovery.  Patient resumed Xeloda on 4/18/2022.  Patient developed worsening sclerodactyly affecting dexterity that required Xeloda to again be briefly held for 2 weeks from 10/3 - 10/17/2022.  Treatment resumed at prior dose after improvement in symptoms.  Patient continues to use emollient cream frequently (currently 5 times daily) and topical triamcinolone 0.1% twice daily intermittently (2 weeks on followed by 2 weeks off as instructed by dermatology).  Patient has experienced improvement in symptoms with decrease in degree of sclerodactyly and improvement in her dexterity, decrease in the degree of erythema in the palmar surface and dorsal aspect of the hand and reduced degree of skin peeling.  8/16/2024: Stable.     *Evidence of steatosis on scans with mild intermittent elevated liver function studies:  Liver function studies increased 8/20/19 with ALT 98, AST 70, normal total bilirubin.  Negative viral hepatitis A, B, and C panel 8/23/18  Likely related to hepatic steatosis.   Subsequent improvement in LFTs  LFTs today are normal    *Chemotherapy induced leukopenia:  WBC today 10.34 with normal differential    *GERD, question of celiac disease:  Patient with significant history of reflux  Patient currently receiving Protonix 40 mg daily daily and Carafate 1 g 4 times daily as needed  Patient required hospitalization 5/29 - 6/1/2022 with presumed viral gastroenteritis that was exacerbated by Xeloda.    EGD performed 5/31/2022 during hospitalization with biopsy that showed focal blunting of villi and atrophy with slight increase in intraepithelial lymphocytes.   Changes were minimal but recommended correlation with serology to exclude celiac disease.  Celiac serology 8/8/2022 negative  Patient previously developed worsening reflux symptoms, temporarily increase Protonix to 40 mg twice daily and we did add Carafate 1 g 4 times daily.    She is taking Protonix 40 mg once daily, is not taking Carafate.     *Insomnia:  Patient with prior paradoxical reaction to Benadryl  Improved previously on temazepam 15 mg nightly as needed.   Patient noted subsequently that temazepam was having no effect.   Patient continuing on gabapentin 900 mg nightly    *Health maintenance:  Patient notes that she has a history of colon polyps as well as ulcerative colitis and was due for a follow-up colonoscopy on 9/12/2019.  We did discuss there is no necessity to pursue colonoscopy in the setting of her metastatic breast cancer.    The patient did undergo colonoscopy on 2/7/2020 with findings of muscular hypertrophy and diverticulosis.   Patient did wish to proceed with further colonoscopic evaluation, performed on 12/20/2022 with poor prep.  Repeat 2/28/2023 with diverticulosis.    *Bilateral shoulder pain:  Chronic difficulty with right shoulder although she has not complained of this in prior visits to our office.  She reported difficulty with abduction.    The patient did undergo MRI of her right shoulder on 11/21/2019 at an outside facility showing multiple abnormalities including supraspinatus tendinosis, labral tear but no evidence of metastatic disease.  Patient developed pain in the left shoulder, was seen by orthopedics and underwent steroid injection with some improvement.  Right shoulder stable.  Patient did undergo physical therapy with some improvement.  She continues to use tramadol 50 mg every 8-12 hours as needed.  Note that current CT 10/20/2022 made notation of left shoulder probable bursitis.    Patient continues with bilateral shoulder pain, left greater than right which does wax  and wane.      *Ocular changes in part related to Xeloda:  Patient experienced a mild degree of blurred vision as well as burning and pruritus.  She was seen by her ophthalmologist and was placed on xiidra ophthalmic drops which have helped.  Likely both issues are to some extent related to Xeloda.  Symptoms did worsen and patient was seen by ophthalmologist at [facility].  She is now using an ocular lubricant at night in addition to artificial tears which have helped.  Symptoms currently stable to improved    *Elevated glucose:  It is noted the patient's glucose at the last few visits has been in the high 100 range, postprandial.  She has had a hemoglobin A1c that has been in the high 5-6 range in the past  Hemoglobin A1c was last checked on 12/15/2023 at 5.7    *Possible loosening of pedicle screw at T3:  CT cervical spine 5/17/2021 showed loosening of the left pedicle screw at T3.  Patient referred to orthopedics and was seen by Dr. Nayak who felt that there were no concerning findings on the scan    *Iron deficiency anemia  Labs on 5/2/2022 with hemoglobin 10.8.  Additional labs with iron 48, ferritin 21.2, iron saturation 11%, TIBC 4 and 32, folate greater than 20, B12 greater than 2000.    Attempted treatment with oral iron daily however patient was intolerant  Patient subsequently received Venofer 300 mg weekly x4 beginning 6/6/2022 and completed on 6/27/2022  Subsequent iron studies on 7/11/2022 showed improvement with iron 62, ferritin 345, iron saturation 18%, TIBC 336.  Hemoglobin had improved up to 12.7 at that time.  Repeat iron studies on 10/3/2022 showed iron replete status with hemoglobin 13.7, iron 121, ferritin 208.7, iron saturation 31%, TIBC 392.  Hemoglobin currently normal at 12.9  8/16/2024: Hemoglobin 13.0.    *Bee Sting  8/16/2024: While visiting [place] she was stung by a bee.  This area continues to be erythematous and itch.  She has been utilizing over-the-counter steroid  cream and Zyrtec.    Will prescribe triamcinolone cream today and advised her to call the office next week if symptoms have not improved.     Plan:   Continue palliative single agent Xeloda 1000 mg a.m., 1500 mg in the p.m.  7 days on followed by 7 days off.  Start triamcinolone cream for bee sting.  Instructed to call the office if symptoms do not improve.  Urinalysis and culture today.  Prescription sent for [id number] mg twice daily x 5 days.  She will contact urogynecology for sooner follow-up given recurrent urinary tract infections  Xgeva due at next visit, 9/30/2024.  To be given every 3 months.  Continue Eliquis 5 mg twice daily.  Continue emollient cream currently utilizing it 5 times a day.  Continue Protonix 40 mg daily.  Continue ocular lubricating gel nightly per ophthalmology.  Continue Provigil 100 mg daily and Cymbalta 30 mg twice daily.  Patient continues routine follow-up with supportive oncology.  Continue gabapentin 900 mg at night.  Continue tramadol 50 mg every 8 hours as needed for pain.  Continue Percocet 5/325 every 4 hours as needed for pain.  CT chest abdomen pelvis and bone scan 1 week prior to MD follow-up visit.  Return to clinic in 6 weeks for MD visit, scan review, CBC, CMP, magnesium, phosphorus.  She will be due for Xgeva at this time.    She continues on high risk medication requiring close monitoring for toxicity    Kylah Corrales, APRN  08/16/2024

## 2024-08-16 ENCOUNTER — LAB (OUTPATIENT)
Dept: OTHER | Facility: HOSPITAL | Age: 64
End: 2024-08-16
Payer: MEDICARE

## 2024-08-16 ENCOUNTER — OFFICE VISIT (OUTPATIENT)
Dept: ONCOLOGY | Facility: CLINIC | Age: 64
End: 2024-08-16
Payer: MEDICARE

## 2024-08-16 VITALS
SYSTOLIC BLOOD PRESSURE: 114 MMHG | DIASTOLIC BLOOD PRESSURE: 82 MMHG | HEIGHT: 61 IN | OXYGEN SATURATION: 97 % | HEART RATE: 94 BPM | WEIGHT: 181.6 LBS | TEMPERATURE: 97.5 F | RESPIRATION RATE: 16 BRPM | BODY MASS INDEX: 34.29 KG/M2

## 2024-08-16 DIAGNOSIS — R30.0 DYSURIA: ICD-10-CM

## 2024-08-16 DIAGNOSIS — L21.9 DERMATITIS, SEBORRHEIC: ICD-10-CM

## 2024-08-16 DIAGNOSIS — G89.3 CANCER ASSOCIATED PAIN: ICD-10-CM

## 2024-08-16 DIAGNOSIS — Z79.899 HIGH RISK MEDICATION USE: ICD-10-CM

## 2024-08-16 DIAGNOSIS — Z17.1 MALIGNANT NEOPLASM OF OVERLAPPING SITES OF RIGHT BREAST IN FEMALE, ESTROGEN RECEPTOR NEGATIVE: Primary | ICD-10-CM

## 2024-08-16 DIAGNOSIS — Z17.1 MALIGNANT NEOPLASM OF OVERLAPPING SITES OF RIGHT BREAST IN FEMALE, ESTROGEN RECEPTOR NEGATIVE: ICD-10-CM

## 2024-08-16 DIAGNOSIS — C50.811 MALIGNANT NEOPLASM OF OVERLAPPING SITES OF RIGHT BREAST IN FEMALE, ESTROGEN RECEPTOR NEGATIVE: ICD-10-CM

## 2024-08-16 DIAGNOSIS — C50.811 MALIGNANT NEOPLASM OF OVERLAPPING SITES OF RIGHT BREAST IN FEMALE, ESTROGEN RECEPTOR NEGATIVE: Primary | ICD-10-CM

## 2024-08-16 LAB
ALBUMIN SERPL-MCNC: 3.9 G/DL (ref 3.5–5.2)
ALBUMIN/GLOB SERPL: 1.3 G/DL
ALP SERPL-CCNC: 81 U/L (ref 39–117)
ALT SERPL W P-5'-P-CCNC: 14 U/L (ref 1–33)
ANION GAP SERPL CALCULATED.3IONS-SCNC: 10.1 MMOL/L (ref 5–15)
AST SERPL-CCNC: 27 U/L (ref 1–32)
BACTERIA UR QL AUTO: ABNORMAL /HPF
BASOPHILS # BLD AUTO: 0.03 10*3/MM3 (ref 0–0.2)
BASOPHILS NFR BLD AUTO: 0.3 % (ref 0–1.5)
BILIRUB SERPL-MCNC: 0.4 MG/DL (ref 0–1.2)
BILIRUB UR QL STRIP: NEGATIVE
BUN SERPL-MCNC: 24 MG/DL (ref 8–23)
BUN/CREAT SERPL: 23.5 (ref 7–25)
CALCIUM SPEC-SCNC: 9.5 MG/DL (ref 8.6–10.5)
CHLORIDE SERPL-SCNC: 100 MMOL/L (ref 98–107)
CLARITY UR: ABNORMAL
CO2 SERPL-SCNC: 29.9 MMOL/L (ref 22–29)
COLOR UR: YELLOW
CREAT SERPL-MCNC: 1.02 MG/DL (ref 0.57–1)
DEPRECATED RDW RBC AUTO: 64.3 FL (ref 37–54)
EGFRCR SERPLBLD CKD-EPI 2021: 61.9 ML/MIN/1.73
EOSINOPHIL # BLD AUTO: 0.25 10*3/MM3 (ref 0–0.4)
EOSINOPHIL NFR BLD AUTO: 2.7 % (ref 0.3–6.2)
ERYTHROCYTE [DISTWIDTH] IN BLOOD BY AUTOMATED COUNT: 17.2 % (ref 12.3–15.4)
GLOBULIN UR ELPH-MCNC: 3.1 GM/DL
GLUCOSE SERPL-MCNC: 102 MG/DL (ref 65–99)
GLUCOSE UR STRIP-MCNC: NEGATIVE MG/DL
HCT VFR BLD AUTO: 40.2 % (ref 34–46.6)
HGB BLD-MCNC: 13 G/DL (ref 12–15.9)
HGB UR QL STRIP.AUTO: ABNORMAL
HOLD SPECIMEN: NORMAL
HYALINE CASTS UR QL AUTO: ABNORMAL /LPF
IMM GRANULOCYTES # BLD AUTO: 0.12 10*3/MM3 (ref 0–0.05)
IMM GRANULOCYTES NFR BLD AUTO: 1.3 % (ref 0–0.5)
KETONES UR QL STRIP: NEGATIVE
LEUKOCYTE ESTERASE UR QL STRIP.AUTO: ABNORMAL
LYMPHOCYTES # BLD AUTO: 1.17 10*3/MM3 (ref 0.7–3.1)
LYMPHOCYTES NFR BLD AUTO: 12.6 % (ref 19.6–45.3)
MCH RBC QN AUTO: 32.3 PG (ref 26.6–33)
MCHC RBC AUTO-ENTMCNC: 32.3 G/DL (ref 31.5–35.7)
MCV RBC AUTO: 99.8 FL (ref 79–97)
MONOCYTES # BLD AUTO: 0.9 10*3/MM3 (ref 0.1–0.9)
MONOCYTES NFR BLD AUTO: 9.7 % (ref 5–12)
NEUTROPHILS NFR BLD AUTO: 6.81 10*3/MM3 (ref 1.7–7)
NEUTROPHILS NFR BLD AUTO: 73.4 % (ref 42.7–76)
NITRITE UR QL STRIP: POSITIVE
NRBC BLD AUTO-RTO: 0 /100 WBC (ref 0–0.2)
PH UR STRIP.AUTO: 6 [PH] (ref 5–8)
PLATELET # BLD AUTO: 261 10*3/MM3 (ref 140–450)
PMV BLD AUTO: 10.2 FL (ref 6–12)
POTASSIUM SERPL-SCNC: 3.9 MMOL/L (ref 3.5–5.2)
PROT SERPL-MCNC: 7 G/DL (ref 6–8.5)
PROT UR QL STRIP: ABNORMAL
RBC # BLD AUTO: 4.03 10*6/MM3 (ref 3.77–5.28)
RBC # UR STRIP: ABNORMAL /HPF
REF LAB TEST METHOD: ABNORMAL
SODIUM SERPL-SCNC: 140 MMOL/L (ref 136–145)
SP GR UR STRIP: 1.01 (ref 1–1.03)
SQUAMOUS #/AREA URNS HPF: ABNORMAL /HPF
UROBILINOGEN UR QL STRIP: ABNORMAL
WBC # UR STRIP: ABNORMAL /HPF
WBC NRBC COR # BLD AUTO: 9.28 10*3/MM3 (ref 3.4–10.8)

## 2024-08-16 PROCEDURE — 80053 COMPREHEN METABOLIC PANEL: CPT | Performed by: INTERNAL MEDICINE

## 2024-08-16 PROCEDURE — 85025 COMPLETE CBC W/AUTO DIFF WBC: CPT | Performed by: INTERNAL MEDICINE

## 2024-08-16 PROCEDURE — 81001 URINALYSIS AUTO W/SCOPE: CPT | Performed by: NURSE PRACTITIONER

## 2024-08-16 PROCEDURE — 87088 URINE BACTERIA CULTURE: CPT

## 2024-08-16 PROCEDURE — 36415 COLL VENOUS BLD VENIPUNCTURE: CPT

## 2024-08-16 PROCEDURE — 87086 URINE CULTURE/COLONY COUNT: CPT

## 2024-08-16 PROCEDURE — 87186 SC STD MICRODIL/AGAR DIL: CPT

## 2024-08-16 RX ORDER — TRIAMCINOLONE ACETONIDE 1 MG/G
CREAM TOPICAL 2 TIMES DAILY
Qty: 15 G | Refills: 0 | Status: SHIPPED | OUTPATIENT
Start: 2024-08-16

## 2024-08-16 RX ORDER — CIPROFLOXACIN 500 MG/1
500 TABLET, FILM COATED ORAL 2 TIMES DAILY
Qty: 10 TABLET | Refills: 0 | Status: SHIPPED | OUTPATIENT
Start: 2024-08-16

## 2024-08-18 LAB — BACTERIA SPEC AEROBE CULT: ABNORMAL

## 2024-08-19 ENCOUNTER — TELEMEDICINE (OUTPATIENT)
Dept: PSYCHIATRY | Facility: CLINIC | Age: 64
End: 2024-08-19
Payer: MEDICARE

## 2024-08-19 ENCOUNTER — SPECIALTY PHARMACY (OUTPATIENT)
Dept: PHARMACY | Facility: HOSPITAL | Age: 64
End: 2024-08-19
Payer: MEDICARE

## 2024-08-19 DIAGNOSIS — F43.21 GRIEF: Primary | ICD-10-CM

## 2024-08-19 PROCEDURE — 90832 PSYTX W PT 30 MINUTES: CPT | Performed by: COUNSELOR

## 2024-08-19 NOTE — PROGRESS NOTES
Specialty Pharmacy Note: Xeloda (capecitabine)    Martha Davey is a 63 y.o. female with breast cancer was seen 8/16/24 by APRN. Per provider dictation, no changes to oral oncology regimen Xeloda (capecitabine).  Labs Review: The CMP and CBC from 8/16/24 have been reviewed. No dose adjustments are needed for the oral specialty medication(s) based on the labs.    Specialty pharmacy will continue to follow patient.    Gage Gonsales, Es, [facility]  Clinical Oncology Pharmacist    8/19/2024  08:59 EDT

## 2024-08-19 NOTE — PROGRESS NOTES
Date: August 19, 2024  Time In: 1200  Time Out: 1228  This provider is located at home address for [facility] (through [facility]), [address], [place] [address] using a secure [employer] Video Visit through [employer]. Patient is being seen remotely via telehealth at home address in [place] and stated they are in a secure environment for this session. The patient's condition being diagnosed/treated is appropriate for telemedicine. The provider identified herself as well as her credentials. The patient, and/or patients guardian, consent to be seen remotely, and when consent is given they understand that the consent allows for patient identifiable information to be sent to a third party as needed. They may refuse to be seen remotely at any time. The electronic data is encrypted and password protected, and the patient and/or guardian has been advised of the potential risks to privacy not withstanding such measures.     You have chosen to receive care through a telehealth visit.  Do you consent to use a video/audio connection for your medical care today? Yes    PROGRESS NOTE  Data:  Martha Davey is a 63 y.o. female who presents today for follow up    Chief Complaint: grief     History of Present Illness: Pt reports the passing of her  in April. Pt reports it has been difficult and expessed gratitude for her sons being supportive and compassionate. Pt reports that her brother, sister, and mother were not as present or helpful as Pt would have hoped for but expressed understanding.       Clinical Maneuvering/Intervention:    (Scales based on 0 - 10 with 10 being the worst)  Depression: 5 Anxiety: 5       Assisted patient in processing above session content; acknowledged and normalized patient’s thoughts, feelings, and concerns.  Rationalized patient thought process regarding recent stressors and life events. Discussed triggers associated with patient's  emotions. Also discussed coping skills for patient to implement. Discussed phases of grief and how grief is not linear.     Reviewed treatment goals and progress regarding identified goals. Progress remains on going at this time. Discussed expectations for next session.     Allowed patient to freely discuss issues without interruption or judgment. Provided safe, confidential environment to facilitate the development of positive therapeutic relationship and encourage open, honest communication. Assisted patient in identifying risk factors which would indicate the need for higher level of care including thoughts to harm self or others and/or self-harming behavior and encouraged patient to contact this office, call 911, or present to the nearest emergency room should any of these events occur. Discussed crisis intervention services and means to access. Patient adamantly and convincingly denies current suicidal or homicidal ideation or perceptual disturbance.    Assessment:   Assessment   Patient appears to maintain relative stability as compared to their baseline.  However, patient continues to struggle with grief which continues to cause impairment in important areas of functioning.  A result, they can be reasonably expected to continue to benefit from treatment and would likely be at increased risk for decompensation otherwise.    Mental Status Exam:   Hygiene:   good  Cooperation:  Cooperative  Eye Contact:  Good  Psychomotor Behavior:  Appropriate  Affect:  Appropriate  Mood: sad  Speech:  Normal  Thought Process:  Linear  Thought Content:  Mood congruent  Suicidal:  None  Homicidal:  None  Hallucinations:  None  Delusion:  None  Memory:  Intact  Orientation:  Person, Place, Time and Situation  Reliability:  fair  Insight:  Fair  Judgement:  Fair  Impulse Control:  Fair  Physical/Medical Issues:  No        Patient's Support Network Includes:  children    Functional Status: Mild impairment     Progress toward goal:  Not at goal    Prognosis: Fair with Ongoing Treatment            Plan:    Patient will continue in individual outpatient therapy with focus on improved functioning and coping skills, maintaining stability, and avoiding decompensation and the need for higher level of care.    Patient will adhere to medication regimen as prescribed and report any side effects. Patient will contact this office, call 911 or present to the nearest emergency room should suicidal or homicidal ideations occur. Provide Cognitive Behavioral Therapy and Solution Focused Therapy to improve functioning, maintain stability, and avoid decompensation and the need for higher level of care.     Return in about 6 weeks, or earlier if symptoms worsen or fail to improve.           VISIT DIAGNOSIS:     ICD-10-CM ICD-9-CM   1. Grief  F43.21 309.0        Diagnoses and all orders for this visit:    1. Grief (Primary)           [facility] No Show Policy:  We understand unexpected circumstances arise; however, anytime you miss your appointment we are unable to provide you appropriate care.  In addition, each appointment missed could have been used to provide care for others.  We ask that you call at least 24 hours in advance to cancel or reschedule an appointment.  We would like to take this opportunity to remind you of our policy stating patients who miss THREE or more appointments without cancelling or rescheduling 24 hours in advance of the appointment may be subject to cancellation of any further visits with our clinic and recommendation to seek in-person services/visits.    Please call [phone] to reschedule your appointment. If there are reasons that make it difficult for you to keep the appointments, please call and let us know how we can help.  Please understand that medication prescribing will not continue without seeing your provider.      [facility]'s No Show Policy reviewed with patient at  today's visit. Patient verbalized understanding of this policy. Discussed with patient that in the event that there are three or more no show visits, it will be recommended that they pursue in-person services/visits as noncompliance with telehealth visits indicates that patient is not an appropriate candidate for telemedicine and would likely be more appropriate for in-person services/visits. Patient verbalizes understanding and is agreeable to this.        This document has been electronically signed by Sophia Vizcarra LCSW.  August 19, 2024 12:43 EDT      Part of this note may be an electronic transcription/translation of spoken language to printed text using the Dragon Dictation System.

## 2024-08-27 ENCOUNTER — TELEMEDICINE (OUTPATIENT)
Dept: PSYCHIATRY | Facility: HOSPITAL | Age: 64
End: 2024-08-27
Payer: MEDICARE

## 2024-08-27 DIAGNOSIS — F41.1 GENERALIZED ANXIETY DISORDER: ICD-10-CM

## 2024-08-27 DIAGNOSIS — R53.0 NEOPLASTIC MALIGNANT RELATED FATIGUE: ICD-10-CM

## 2024-08-27 DIAGNOSIS — C50.919 PRIMARY MALIGNANT NEOPLASM OF BREAST WITH METASTASIS: Primary | ICD-10-CM

## 2024-08-27 DIAGNOSIS — F33.1 MAJOR DEPRESSIVE DISORDER, RECURRENT EPISODE, MODERATE: ICD-10-CM

## 2024-08-27 PROCEDURE — 99214 OFFICE O/P EST MOD 30 MIN: CPT | Performed by: NURSE PRACTITIONER

## 2024-08-27 PROCEDURE — 1159F MED LIST DOCD IN RCRD: CPT | Performed by: NURSE PRACTITIONER

## 2024-08-27 PROCEDURE — 1160F RVW MEDS BY RX/DR IN RCRD: CPT | Performed by: NURSE PRACTITIONER

## 2024-08-27 RX ORDER — LORAZEPAM 0.5 MG/1
0.5 TABLET ORAL NIGHTLY PRN
Qty: 15 TABLET | Refills: 0 | Status: SHIPPED | OUTPATIENT
Start: 2024-08-27

## 2024-08-27 NOTE — PROGRESS NOTES
Behavioral Oncology Services  Video visit conducted via [employer] Video Visit  You have chosen to receive care through a telehealth visit.  Do you consent to use a video/audio connection for your medical care today? YES  Telehealth provided in patient's home.  Location of Provider: [place], [place]     Subjective  Patient ID: Martha Davey is a 63 y.o. female is seen via telehealth in the Behavioral Oncology Clinic.    CC: Anxiety, depression, grief    HPI/ Interval History:   Pt is seen in follow up regarding ongoing sx of anxiety and depression in survivorship of 's recent passing and alongside personal illness/ treatment for metastatic breast cancer.    Pt reports recent enjoyment of trip with friends, greatly enjoying this and appreciating connection to friends. Allowed space to engage independently, people watch. Unfortunately, got stung by a bee with significant reaction, although fortunately healing and not too intrusive. Foot remains painful from alternate issue, somewhat limiting walking, but continued to engage as desired. Returned home appx a week ago. Continues to struggle to make progress on tasks around home, noting adjusted concentration, continuing to report poor sleep at night. Does appreciate benefit to sleep when she takes ativan 0.5 mg PRN insomnia. Continues to acknowledge sleeping late into the morning, often not getting up until noon. Schedule and routine remain disrupted.    Notes current priority of filling out necessary paperwork, noting multiple areas of desired progress. Continues to take modafinil 1/2 tab, not taking this until she wakes around noon. Today, reports getting up at 10 with use of alarm. Sees goal of getting up at 9:30, drinking coffee, and getting into day. Pt continues to meet with therapist regularly, continuing to appreciate benefit to this. Identifies ongoing concern for children, also noting personal concerns regarding adjusted schedule and routine,  functional impairment alongside current sleep habits.    Exam: As above    Recent Labs Reviewed:  Office Visit on 08/16/2024   Component Date Value    Urine Culture 08/16/2024 >100,000 CFU/mL Escherichia coli (A)     Color, UA 08/16/2024 Yellow     Appearance, UA 08/16/2024 Cloudy (A)     pH, UA 08/16/2024 6.0     Specific Gravity, UA 08/16/2024 1.015     Glucose, UA 08/16/2024 Negative     Ketones, UA 08/16/2024 Negative     Bilirubin, UA 08/16/2024 Negative     Blood, UA 08/16/2024 Large (3+) (A)     Protein, UA 08/16/2024 100 mg/dL (2+) (A)     Leuk Esterase, UA 08/16/2024 Large (3+) (A)     Nitrite, UA 08/16/2024 Positive (A)     Urobilinogen, UA 08/16/2024 0.2 E.U./dL     Extra Tube 08/16/2024 Hold for add-ons.     RBC, UA 08/16/2024 Too Numerous to Count (A)     WBC, UA 08/16/2024 Too Numerous to Count (A)     Bacteria, UA 08/16/2024 2+ (A)     Squamous Epithelial Cell* 08/16/2024 Unable to determine due to loaded field (A)     Hyaline Casts, UA 08/16/2024 Unable to determine due to loaded field     Methodology 08/16/2024 Manual Light Microscopy    Lab on 08/16/2024   Component Date Value    Glucose 08/16/2024 102 (H)     BUN 08/16/2024 24 (H)     Creatinine 08/16/2024 1.02 (H)     Sodium 08/16/2024 140     Potassium 08/16/2024 3.9     Chloride 08/16/2024 100     CO2 08/16/2024 29.9 (H)     Calcium 08/16/2024 9.5     Total Protein 08/16/2024 7.0     Albumin 08/16/2024 3.9     ALT (SGPT) 08/16/2024 14     AST (SGOT) 08/16/2024 27     Alkaline Phosphatase 08/16/2024 81     Total Bilirubin 08/16/2024 0.4     Globulin 08/16/2024 3.1     A/G Ratio 08/16/2024 1.3     BUN/Creatinine Ratio 08/16/2024 23.5     Anion Gap 08/16/2024 10.1     eGFR 08/16/2024 61.9     WBC 08/16/2024 9.28     RBC 08/16/2024 4.03     Hemoglobin 08/16/2024 13.0     Hematocrit 08/16/2024 40.2     MCV 08/16/2024 99.8 (H)     MCH 08/16/2024 32.3     MCHC 08/16/2024 32.3     RDW 08/16/2024 17.2 (H)     RDW-SD 08/16/2024 64.3 (H)     MPV  08/16/2024 10.2     Platelets 08/16/2024 261     Neutrophil % 08/16/2024 73.4     Lymphocyte % 08/16/2024 12.6 (L)     Monocyte % 08/16/2024 9.7     Eosinophil % 08/16/2024 2.7     Basophil % 08/16/2024 0.3     Immature Grans % 08/16/2024 1.3 (H)     Neutrophils, Absolute 08/16/2024 6.81     Lymphocytes, Absolute 08/16/2024 1.17     Monocytes, Absolute 08/16/2024 0.90     Eosinophils, Absolute 08/16/2024 0.25     Basophils, Absolute 08/16/2024 0.03     Immature Grans, Absolute 08/16/2024 0.12 (H)     nRBC 08/16/2024 0.0    Admission on 07/23/2024, Discharged on 07/23/2024   Component Date Value    SARS Antigen 07/23/2024 Not Detected     Internal Control 07/23/2024 Passed     Lot Number 07/23/2024 [id number]     Expiration Date 07/23/2024 03/11/25    Office Visit on 07/02/2024   Component Date Value    Urine Culture 07/02/2024 >100,000 CFU/mL Escherichia coli (A)     Color, UA 07/02/2024 Yellow     Appearance, UA 07/02/2024 Cloudy (A)     pH, UA 07/02/2024 6.0     Specific Gravity, UA 07/02/2024 1.025     Glucose, UA 07/02/2024 Negative     Ketones, UA 07/02/2024 Negative     Bilirubin, UA 07/02/2024 Negative     Blood, UA 07/02/2024 Moderate (2+) (A)     Protein, UA 07/02/2024 100 mg/dL (2+) (A)     Leuk Esterase, UA 07/02/2024 Large (3+) (A)     Nitrite, UA 07/02/2024 Positive (A)     Urobilinogen, UA 07/02/2024 0.2 E.U./dL     RBC, UA 07/02/2024 3-5 (A)     WBC, UA 07/02/2024 21-50 (A)     Bacteria, UA 07/02/2024 3+ (A)     Squamous Epithelial Cell* 07/02/2024 0-3     Methodology 07/02/2024 Manual Light Microscopy    Lab on 07/02/2024   Component Date Value    Glucose 07/02/2024 116 (H)     BUN 07/02/2024 25 (H)     Creatinine 07/02/2024 0.99     Sodium 07/02/2024 145     Potassium 07/02/2024 4.6     Chloride 07/02/2024 104     CO2 07/02/2024 28.7     Calcium 07/02/2024 9.8     Total Protein 07/02/2024 6.6     Albumin 07/02/2024 3.9     ALT (SGPT) 07/02/2024 6     AST (SGOT) 07/02/2024 25     Alkaline Phosphatase  07/02/2024 71     Total Bilirubin 07/02/2024 0.3     Globulin 07/02/2024 2.7     A/G Ratio 07/02/2024 1.4     BUN/Creatinine Ratio 07/02/2024 25.3 (H)     Anion Gap 07/02/2024 12.3     eGFR 07/02/2024 64.2     Magnesium 07/02/2024 2.3     Phosphorus 07/02/2024 4.3     WBC 07/02/2024 10.34     RBC 07/02/2024 4.08     Hemoglobin 07/02/2024 12.9     Hematocrit 07/02/2024 39.6     MCV 07/02/2024 97.1 (H)     MCH 07/02/2024 31.6     MCHC 07/02/2024 32.6     RDW 07/02/2024 16.9 (H)     RDW-SD 07/02/2024 58.1 (H)     MPV 07/02/2024 10.0     Platelets 07/02/2024 310     Neutrophil % 07/02/2024 75.8     Lymphocyte % 07/02/2024 13.6 (L)     Monocyte % 07/02/2024 7.2     Eosinophil % 07/02/2024 2.1     Basophil % 07/02/2024 0.8     Immature Grans % 07/02/2024 0.5     Neutrophils, Absolute 07/02/2024 7.84 (H)     Lymphocytes, Absolute 07/02/2024 1.41     Monocytes, Absolute 07/02/2024 0.74     Eosinophils, Absolute 07/02/2024 0.22     Basophils, Absolute 07/02/2024 0.08     Immature Grans, Absolute 07/02/2024 0.05     nRBC 07/02/2024 0.0    Labs reviewed    Current Psychotropic Medications:  Duloxetine 30 mg bid   Lorazeapam 0.5 mg q hs PRN insomnia  Modafinil 100 mg daily (1/2 200 mg tab in AM)  Gabapentin per Dr Eller    Plan of Care/ Medical Decision Making  Continue medications as written.  Discussed importance of activity scheduling, out of bed during day. Reviewed pt desired routine and schedule. Education provided regarding sleep routine, restriction, likely not needing ativan if not waking until late the following day due to achieving sleep needs. Pt in agreement. WIll continue PRN ativan while encouraging trend toward desired sleep schedule. Recommend increase in modafinil to 200 mg daily, while instructing patient not to take this after 10 AM.  Encouraged ongoing connection to therapy services.  FU scheduled in 6 weeks.    Diagnoses and all orders for this visit:    1. Metastatic breast cancer (Primary)    2. Major  depressive disorder, recurrent episode, moderate    3. Neoplastic malignant related fatigue    4. Generalized anxiety disorder  -     LORazepam (Ativan) 0.5 MG tablet; Take 1 tablet by mouth At Night As Needed for Anxiety.  Dispense: 15 tablet; Refill: 0    I spent 35 minutes caring for Martha on this date of service. This time includes time spent by me in the following activities: preparing for the visit, reviewing tests, obtaining and/or reviewing a separately obtained history, performing a medically appropriate examination and/or evaluation, counseling and educating the patient/family/caregiver, ordering medications, tests, or procedures, and documenting information in the medical record.

## 2024-08-29 DIAGNOSIS — R31.9 HEMATURIA, UNSPECIFIED TYPE: ICD-10-CM

## 2024-08-29 DIAGNOSIS — E78.5 HYPERLIPIDEMIA, UNSPECIFIED HYPERLIPIDEMIA TYPE: Primary | ICD-10-CM

## 2024-08-29 DIAGNOSIS — R73.9 HYPERGLYCEMIA: ICD-10-CM

## 2024-08-29 DIAGNOSIS — I10 HYPERTENSION, UNSPECIFIED TYPE: ICD-10-CM

## 2024-09-04 DIAGNOSIS — G89.3 CANCER ASSOCIATED PAIN: ICD-10-CM

## 2024-09-04 RX ORDER — TRAMADOL HYDROCHLORIDE 50 MG/1
50 TABLET ORAL EVERY 8 HOURS PRN
Qty: 90 TABLET | Refills: 0 | Status: SHIPPED | OUTPATIENT
Start: 2024-09-04

## 2024-09-04 NOTE — TELEPHONE ENCOUNTER
Caller: Martha Davey    Relationship: Self    Best call back number: [phone]    Requested Prescriptions:   Requested Prescriptions     Pending Prescriptions Disp Refills    traMADol (ULTRAM) 50 MG tablet 90 tablet 0     Sig: Take 1 tablet by mouth Every 8 (Eight) Hours As Needed for Moderate Pain.        Pharmacy where request should be sent: [facility] [id number] [address] AT [facility] [phone] [facility] [phone]      Last office visit with prescribing clinician: 7/2/2024   Last telemedicine visit with prescribing clinician: Visit date not found   Next office visit with prescribing clinician: 9/30/2024     Additional details provided by patient: NA    Does the patient have less than a 3 day supply:  [x] Yes  [] No    Would you like a call back once the refill request has been completed: [] Yes [] No    If the office needs to give you a call back, can they leave a voicemail: [] Yes [] No    Kait Arnett Rep   09/04/24 12:29 EDT

## 2024-09-12 DIAGNOSIS — F41.1 GENERALIZED ANXIETY DISORDER: ICD-10-CM

## 2024-09-12 DIAGNOSIS — F41.1 GENERALIZED ANXIETY DISORDER: Primary | ICD-10-CM

## 2024-09-12 RX ORDER — LORAZEPAM 0.5 MG/1
0.5 TABLET ORAL NIGHTLY PRN
Qty: 30 TABLET | Refills: 0 | Status: SHIPPED | OUTPATIENT
Start: 2024-09-12 | End: 2025-09-12

## 2024-09-12 RX ORDER — LORAZEPAM 0.5 MG/1
0.5 TABLET ORAL NIGHTLY PRN
Qty: 15 TABLET | Refills: 0 | Status: SHIPPED | OUTPATIENT
Start: 2024-09-12 | End: 2024-09-12

## 2024-09-13 ENCOUNTER — OFFICE VISIT (OUTPATIENT)
Dept: INTERNAL MEDICINE | Facility: CLINIC | Age: 64
End: 2024-09-13
Payer: MEDICARE

## 2024-09-13 VITALS
BODY MASS INDEX: 34.36 KG/M2 | WEIGHT: 182 LBS | HEART RATE: 99 BPM | OXYGEN SATURATION: 95 % | DIASTOLIC BLOOD PRESSURE: 78 MMHG | SYSTOLIC BLOOD PRESSURE: 110 MMHG | HEIGHT: 61 IN

## 2024-09-13 DIAGNOSIS — Z00.00 HEALTH CARE MAINTENANCE: Primary | ICD-10-CM

## 2024-09-13 DIAGNOSIS — N39.41 URGE INCONTINENCE OF URINE: ICD-10-CM

## 2024-09-13 DIAGNOSIS — Z78.0 MENOPAUSE: ICD-10-CM

## 2024-09-13 DIAGNOSIS — G60.0 CHARCOT-MARIE-TOOTH DISEASE: ICD-10-CM

## 2024-09-13 DIAGNOSIS — N39.0 RECURRENT UTI: ICD-10-CM

## 2024-09-13 DIAGNOSIS — L89.892 PRESSURE INJURY OF LEFT FOOT, STAGE 2: ICD-10-CM

## 2024-09-13 RX ORDER — PHENAZOPYRIDINE HYDROCHLORIDE 200 MG/1
200 TABLET, FILM COATED ORAL 3 TIMES DAILY PRN
Qty: 15 TABLET | Refills: 3 | Status: SHIPPED | OUTPATIENT
Start: 2024-09-13

## 2024-09-13 RX ORDER — CEPHALEXIN 500 MG/1
500 CAPSULE ORAL 2 TIMES DAILY
Qty: 14 CAPSULE | Refills: 0 | Status: SHIPPED | OUTPATIENT
Start: 2024-09-13

## 2024-09-13 RX ORDER — VIBEGRON 75 MG/1
75 TABLET, FILM COATED ORAL DAILY
Qty: 30 TABLET | Refills: 5 | Status: SHIPPED | OUTPATIENT
Start: 2024-09-13

## 2024-09-13 RX ORDER — LOSARTAN POTASSIUM 25 MG/1
25 TABLET ORAL DAILY
Qty: 90 TABLET | Refills: 3 | Status: SHIPPED | OUTPATIENT
Start: 2024-09-13

## 2024-09-13 NOTE — PROGRESS NOTES
Subjective   The ABCs of the Annual Wellness Visit  Medicare Wellness Visit      Martha Davey is a 63 y.o. patient who presents for a Medicare Wellness Visit.    The following portions of the patient's history were reviewed and   updated as appropriate: allergies, current medications, past family history, past medical history, past social history, past surgical history, and problem list.    Compared to one year ago, the patient's physical   health is worse.  Compared to one year ago, the patient's mental   health is worse.    Recent Hospitalizations:  She was not admitted to the hospital during the last year.     Current Medical Providers:  Patient Care Team:  Jazmine Chanel MD as PCP - General  Jazmine Chanel MD as PCP - Family Medicine  Jazmine Chanel MD Hart, Andrew, MD as Consulting Physician (Hematology and Oncology)  Roxanne Horta MD as Referring Physician (Obstetrics and Gynecology)  Ubaldo Hancock Jr., MD as Consulting Physician (Hematology and Oncology)  Louann Jack OD (Optometry)    Outpatient Medications Prior to Visit   Medication Sig Dispense Refill    apixaban (Eliquis) 5 MG tablet tablet Take 1 tablet by mouth Every 12 (Twelve) Hours. 180 tablet 3    azelastine (ASTELIN) 0.1 % nasal spray 2 sprays into the nostril(s) as directed by provider 2 (Two) Times a Day. Use in each nostril as directed 30 mL 12    calcium carbonate (OS-CARY) 600 MG tablet Take 1 tablet by mouth 2 (Two) Times a Day With Meals.      capecitabine (XELODA) 500 MG chemo tablet TAKE 2 TABLETS (1000MG) BY MOUTH EVERY MORNING AND 3 TABLETS (1500MG) BY MOUTH EVERY EVENING FOR 7 DAYS. TAKE 7 DAYS OFF, THEN REPEAT CYCLE. 70 tablet 5    cholecalciferol (VITAMIN D3) 1000 units tablet Take 1 tablet by mouth Daily.      CRANBERRY PO Take  by mouth.      Cyanocobalamin ER 1000 MCG tablet controlled-release Take 1 tablet by mouth Daily.      Diclofenac Sodium (VOLTAREN) 1 % gel gel Place 4 g on the skin as directed by provider.       DULoxetine (Cymbalta) 60 MG capsule Take 1 capsule by mouth Daily. 90 capsule 3    FLONASE ALLERGY RELIEF 50 MCG/ACT nasal spray 2 sprays into the nostril(s) as directed by provider Daily.  11    gabapentin (NEURONTIN) 300 MG capsule Take 1 capsule by mouth 3 (Three) Times a Day. 270 capsule 1    Hylan (SYNVISC) 16 MG/2ML solution prefilled syringe injection Inject 2 mL into the appropriate joint as directed by provider As Needed.      Lialda 1.2 g EC tablet TAKE 1 TABLET BY MOUTH TWICE  DAILY 180 tablet 3    LORazepam (Ativan) 0.5 MG tablet Take 1 tablet by mouth At Night As Needed for Anxiety. 30 tablet 0    metaxalone (Skelaxin) 800 MG tablet Take 1 tablet by mouth 3 (Three) Times a Day As Needed for Muscle Spasms. 30 tablet 0    methenamine (HIPREX) 1 g tablet Take 1 tablet by mouth Daily.      modafinil (PROVIGIL) 200 MG tablet Take 1 tablet by mouth Daily. 30 tablet 2    Multiple Vitamins-Minerals (Multivitamin Gummies Womens) chewable tablet Chew 1 tablet Daily.      mupirocin (BACTROBAN) 2 % ointment Apply 1 Application topically to the appropriate area as directed As Needed.      ondansetron ODT (ZOFRAN-ODT) 8 MG disintegrating tablet Place 1 tablet on the tongue Every 8 (Eight) Hours As Needed for Nausea or Vomiting. 30 tablet 1    oxyCODONE-acetaminophen (PERCOCET) 5-325 MG per tablet Take 1 tablet by mouth Every 4 (Four) Hours As Needed for Moderate Pain. 60 tablet 0    pantoprazole (PROTONIX) 40 MG EC tablet TAKE 1 TABLET BY MOUTH DAILY 90 tablet 3    Probiotic Product (PROBIOTIC-10 PO) Take  by mouth.      rosuvastatin (CRESTOR) 5 MG tablet TAKE 1 TABLET BY MOUTH DAILY 90 tablet 3    saccharomyces boulardii (Florastor) 250 MG capsule Take 1 capsule by mouth 2 (Two) Times a Day. 20 capsule 0    traMADol (ULTRAM) 50 MG tablet Take 1 tablet by mouth Every 8 (Eight) Hours As Needed for Moderate Pain. 90 tablet 0    triamcinolone (KENALOG) 0.1 % cream Apply  topically to the appropriate area as  directed 2 (Two) Times a Day. 15 g 0    urea (CARMOL) 10 % cream Apply  topically to the appropriate area as directed 3 (Three) Times a Day. 453 g 1    White Petrolatum-Mineral Oil (Wh Petrol-Mineral Oil-Lanolin) 0.1-0.1 % ointment Apply  to eye(s) as directed by provider.      losartan (Cozaar) 50 MG tablet Take 1 tablet by mouth Daily. 90 tablet 3    phenazopyridine (PYRIDIUM) 200 MG tablet Take 1 tablet by mouth 3 (Three) Times a Day As Needed for Bladder Spasms. 6 tablet 0    phenazopyridine (Pyridium) 200 MG tablet Take 1 tablet by mouth 3 (Three) Times a Day As Needed for Bladder Spasms. 15 tablet 1    ciprofloxacin (CIPRO) 500 MG tablet Take 1 tablet by mouth 2 (Two) Times a Day. (Patient not taking: Reported on 9/13/2024) 10 tablet 0    cephalexin (Keflex) 500 MG capsule Take 1 capsule by mouth 2 (Two) Times a Day. (Patient not taking: Reported on 9/13/2024) 14 capsule 0    Vibegron (Gemtesa) 75 MG tablet Take 1 tablet by mouth Daily. (Patient not taking: Reported on 9/13/2024) 30 tablet 5     No facility-administered medications prior to visit.     Opioid medication/s are on active medication list.  and I have evaluated her active treatment plan and pain score trends (see table).  There were no vitals filed for this visit.  I have reviewed the chart for potential of high risk medication and harmful drug interactions in the elderly.        Aspirin is not on active medication list.  Aspirin use is not indicated based on review of current medical condition/s. Risk of harm outweighs potential benefits.  .    Patient Active Problem List   Diagnosis    Malignant neoplasm of overlapping sites of right breast in female, estrogen receptor negative    Hematuria    Hyperlipidemia    Hypertension    Hereditary sensorimotor neuropathy    Hyperglycemia    MARIA M on CPAP    Gastroesophageal reflux disease    Colon polyp    Diverticulitis of colon with perforation    Foot pain    Osteoarthritis of knee    Leukocytosis     "Osteoarthritis of shoulder    Senile osteopenia    Chronic right-sided thoracic back pain    Closed fracture of left fibula and tibia    Closed displaced spiral fracture of shaft of left tibia    Cancer associated pain    Cord compression    Closed T6 spinal fracture    History of pulmonary embolism    Bone metastases    Surgery follow-up examination    Dysuria    Pericardial effusion    Other fatigue    Acute pulmonary embolism    Breast cancer, right    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  ACP discussion was held with the patient during this visit. Patient has an advance directive (not in EMR), copy requested.            Objective   Vitals:    24 1301 24 1342   BP: 130/82 110/78   Pulse: 99    SpO2: 95%    Weight: 82.6 kg (182 lb)    Height: 154.9 cm (61\")        Estimated body mass index is 34.39 kg/m² as calculated from the following:    Height as of this encounter: 154.9 cm (61\").    Weight as of this encounter: 82.6 kg (182 lb).            Does the patient have evidence of cognitive impairment? No                                                                                                Health  Risk Assessment    Smoking Status:  Social History     Tobacco Use   Smoking Status Never   Smokeless Tobacco Never     Alcohol Consumption:  Social History     Substance and Sexual Activity   Alcohol Use Yes    Comment: social       Fall Risk Screen  STEADI Fall Risk Assessment was completed, and patient is at HIGH risk for falls. Assessment completed on:2024    Depression Screenin/13/2024     1:04 PM   PHQ-2/PHQ-9 Depression Screening   Little Interest or " Pleasure in Doing Things 0-->not at all   Feeling Down, Depressed or Hopeless 0-->not at all   PHQ-9: Brief Depression Severity Measure Score 0     Health Habits and Functional and Cognitive Screenin/13/2024     1:05 PM   Functional & Cognitive Status   Who do you live with? Child           Age-appropriate Screening Schedule:  Refer to the list below for future screening recommendations based on patient's age, sex and/or medical conditions. Orders for these recommended tests are listed in the plan section. The patient has been provided with a written plan.    Health Maintenance List  Health Maintenance   Topic Date Due    DXA SCAN  2023    INFLUENZA VACCINE  2024    LIPID PANEL  2024    BMI FOLLOWUP  2025    ANNUAL WELLNESS VISIT  2025    COLORECTAL CANCER SCREENING  2028    TDAP/TD VACCINES (4 - Td or Tdap) 2029    HEPATITIS C SCREENING  Completed    COVID-19 Vaccine  Completed    ZOSTER VACCINE  Completed    Pneumococcal Vaccine 0-64  Aged Out    MAMMOGRAM  Discontinued    PAP SMEAR  Discontinued                                                                                                                                                CMS Preventative Services Quick Reference  Risk Factors Identified During Encounter  Immunizations Discussed/Encouraged: Influenza and COVID19    The above risks/problems have been discussed with the patient.  Pertinent information has been shared with the patient in the After Visit Summary.  An After Visit Summary and PPPS were made available to the patient.    Follow Up:   Next Medicare Wellness visit to be scheduled in 1 year.         Additional E&M Note during same encounter follows:  Patient has additional, significant, and separately identifiable condition(s)/problem(s) that require work above and beyond the Medicare Wellness Visit     Chief Complaint  Annual Exam    Subjective   HPI  MELISA is also being seen today for an  "annual adult preventative physical exam.  and MELISA is also being seen today for additional medical problem/s. Patient has several medical issues. 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 She has braces for her feet related to foot drop. Unfortunately she now has a pressure wound lateral aspect of her left foot.  Reports \"shoulder\" pain proximal upper extremity B.                   Objective   Vital Signs:  /78   Pulse 99   Ht 154.9 cm (61\")   Wt 82.6 kg (182 lb)   SpO2 95%   BMI 34.39 kg/m²   Physical Exam  Vitals and nursing note reviewed.   Constitutional:       Appearance: Normal appearance.   HENT:      Head: Normocephalic and atraumatic.      Right Ear: Tympanic membrane normal.      Left Ear: Tympanic membrane normal.      Nose: Nose normal.      Mouth/Throat:      Mouth: Mucous membranes are moist. Mucous membranes are dry.   Eyes:      Extraocular Movements: Extraocular movements intact.      Pupils: Pupils are equal, round, and reactive to light.   Cardiovascular:      Rate and Rhythm: Normal rate and regular rhythm.      Pulses: Normal pulses.      Heart sounds: Normal heart sounds.   Pulmonary:      Effort: Pulmonary effort is normal.      Breath sounds: Normal breath sounds.   Abdominal:      General: Abdomen is flat.      Palpations: Abdomen is soft.   Musculoskeletal:      Cervical back: Normal range of motion.      Comments: Decreased strength B lower extremity stable   Skin:     Comments: Left foot w pressure wound lateral aspect     Neurological:      General: No focal deficit present.      Mental Status: She is alert and oriented to person, place, and time.   Psychiatric:         Mood and Affect: Mood normal.         Behavior: Behavior normal.         Thought Content: Thought content normal.         Judgment: Judgment normal.         The following data was reviewed by: Jazmine Chanel MD on 09/13/2024:        Assessment and Plan Additional age appropriate " preventative wellness advice topics were discussed during today's preventative wellness exam(some topics already addressed during AWV portion of the note above):    Physical Activity: Advised cardiovascular activity 150 minutes per week as tolerated. (example brisk walk for 30 minutes, 5 days a week).     Nutrition: Discussed nutrition plan with patient. Information shared in after visit summary. Goal is for a well balanced diet to enhance overall health.     Healthy Weight: Discussed current and goal BMI with patient. Steps to attain this goal discussed. Information shared in after visit summary.     Injury Prevention Discussion:  Information shared in after visit summary.                    Menopause  TO TEST DEXA   Recurrent UTI  Keflex bid x 7 d pyrdium up to tid prn. To start estrace cream and to f/u w urogyn.  Health care maintenance  She is advised continue hcm eye, dental, skin, mammo, cscope, movement, and nutrtion.   Urge incontinence of urine  She does have a uti. Keflex as above. To urogyn. Consider daily suppresive therapy.   Charcot-Saloni-Tooth disease  Neurology referral. Movement disorder clinic.     Pressure injury of left foot, stage 2    Wound care clinic    Orders Placed This Encounter   Procedures    DEXA Bone Density Axial     Scheduling Instructions:      [facility]     Order Specific Question:   Is patient taking or have taken long term Glucocorticoid (steroids)?     Answer:   No     Order Specific Question:   Does the patient have rheumatoid arthritis?     Answer:   No     Order Specific Question:   Does the patient have secondary osteoporosis?     Answer:   No     Order Specific Question:   Reason for Exam:     Answer:   bone density screening     Order Specific Question:   Release to patient     Answer:   Routine Release [[id number]]    Ambulatory Referral to Gynecologic Urology     Referral Priority:   Urgent     Referral Type:   Consultation     Referral Reason:    Specialty Services Required     Referred to Provider:   Martha Choi MD     Requested Specialty:   Obstetrics and Gynecology     Number of Visits Requested:   1    Ambulatory Referral to Wound Clinic     Referral Priority:   Urgent     Referral Type:   Consultation     Referral Reason:   Specialty Services Required     Number of Visits Requested:   1     New Medications Ordered This Visit   Medications    cephalexin (Keflex) 500 MG capsule     Sig: Take 1 capsule by mouth 2 (Two) Times a Day.     Dispense:  14 capsule     Refill:  0    phenazopyridine (Pyridium) 200 MG tablet     Sig: Take 1 tablet by mouth 3 (Three) Times a Day As Needed for Bladder Spasms.     Dispense:  15 tablet     Refill:  3    Vibegron (Gemtesa) 75 MG tablet     Sig: Take 1 tablet by mouth Daily.     Dispense:  30 tablet     Refill:  5    losartan (Cozaar) 25 MG tablet     Sig: Take 1 tablet by mouth Daily.     Dispense:  90 tablet     Refill:  3        I spent 55 minutes caring for Martha on this date of service. This time includes time spent by me in the following activities:preparing for the visit, reviewing tests, obtaining and/or reviewing a separately obtained history, performing a medically appropriate examination and/or evaluation , counseling and educating the patient/family/caregiver, ordering medications, tests, or procedures, referring and communicating with other health care professionals , documenting information in the medical record, and independently interpreting results and communicating that information with the patient/family/caregiver  Follow Up   No follow-ups on file.  Patient was given instructions and counseling regarding her condition or for health maintenance advice. Please see specific information pulled into the AVS if appropriate.

## 2024-09-15 LAB
ALBUMIN SERPL-MCNC: 4.2 G/DL (ref 3.5–5.2)
ALBUMIN/GLOB SERPL: 1.8 G/DL
ALP SERPL-CCNC: 71 U/L (ref 39–117)
ALT SERPL-CCNC: 16 U/L (ref 1–33)
APPEARANCE UR: ABNORMAL
AST SERPL-CCNC: 22 U/L (ref 1–32)
BACTERIA #/AREA URNS HPF: ABNORMAL /[HPF]
BACTERIA UR CULT: ABNORMAL
BACTERIA UR CULT: ABNORMAL
BASOPHILS # BLD AUTO: 0.05 10*3/MM3 (ref 0–0.2)
BASOPHILS NFR BLD AUTO: 0.6 % (ref 0–1.5)
BILIRUB SERPL-MCNC: 0.4 MG/DL (ref 0–1.2)
BILIRUB UR QL STRIP: NEGATIVE
BUN SERPL-MCNC: 23 MG/DL (ref 8–23)
BUN/CREAT SERPL: 23.5 (ref 7–25)
CALCIUM SERPL-MCNC: 9.4 MG/DL (ref 8.6–10.5)
CASTS URNS QL MICRO: ABNORMAL /LPF
CHLORIDE SERPL-SCNC: 101 MMOL/L (ref 98–107)
CHOLEST SERPL-MCNC: 171 MG/DL (ref 0–200)
CO2 SERPL-SCNC: 32.2 MMOL/L (ref 22–29)
COLOR UR: YELLOW
CREAT SERPL-MCNC: 0.98 MG/DL (ref 0.57–1)
EGFRCR SERPLBLD CKD-EPI 2021: 65 ML/MIN/1.73
EOSINOPHIL # BLD AUTO: 0.22 10*3/MM3 (ref 0–0.4)
EOSINOPHIL NFR BLD AUTO: 2.8 % (ref 0.3–6.2)
EPI CELLS #/AREA URNS HPF: >10 /HPF (ref 0–10)
ERYTHROCYTE [DISTWIDTH] IN BLOOD BY AUTOMATED COUNT: 17 % (ref 12.3–15.4)
GLOBULIN SER CALC-MCNC: 2.3 GM/DL
GLUCOSE SERPL-MCNC: 110 MG/DL (ref 65–99)
GLUCOSE UR QL STRIP: NEGATIVE
HBA1C MFR BLD: 5.9 % (ref 4.8–5.6)
HCT VFR BLD AUTO: 38.8 % (ref 34–46.6)
HDLC SERPL-MCNC: 83 MG/DL (ref 40–60)
HGB BLD-MCNC: 13.3 G/DL (ref 12–15.9)
HGB UR QL STRIP: ABNORMAL
IMM GRANULOCYTES # BLD AUTO: 0.02 10*3/MM3 (ref 0–0.05)
IMM GRANULOCYTES NFR BLD AUTO: 0.3 % (ref 0–0.5)
KETONES UR QL STRIP: NEGATIVE
LDLC SERPL CALC-MCNC: 70 MG/DL (ref 0–100)
LEUKOCYTE ESTERASE UR QL STRIP: ABNORMAL
LYMPHOCYTES # BLD AUTO: 1.56 10*3/MM3 (ref 0.7–3.1)
LYMPHOCYTES NFR BLD AUTO: 19.6 % (ref 19.6–45.3)
MCH RBC QN AUTO: 33.3 PG (ref 26.6–33)
MCHC RBC AUTO-ENTMCNC: 34.3 G/DL (ref 31.5–35.7)
MCV RBC AUTO: 97.2 FL (ref 79–97)
MICRO URNS: ABNORMAL
MICRO URNS: ABNORMAL
MONOCYTES # BLD AUTO: 0.51 10*3/MM3 (ref 0.1–0.9)
MONOCYTES NFR BLD AUTO: 6.4 % (ref 5–12)
NEUTROPHILS # BLD AUTO: 5.61 10*3/MM3 (ref 1.7–7)
NEUTROPHILS NFR BLD AUTO: 70.3 % (ref 42.7–76)
NITRITE UR QL STRIP: NEGATIVE
NRBC BLD AUTO-RTO: 0 /100 WBC (ref 0–0.2)
OTHER ANTIBIOTIC SUSC ISLT: ABNORMAL
PH UR STRIP: 6 [PH] (ref 5–7.5)
PLATELET # BLD AUTO: 281 10*3/MM3 (ref 140–450)
POTASSIUM SERPL-SCNC: 4.6 MMOL/L (ref 3.5–5.2)
PROT SERPL-MCNC: 6.5 G/DL (ref 6–8.5)
PROT UR QL STRIP: ABNORMAL
RBC # BLD AUTO: 3.99 10*6/MM3 (ref 3.77–5.28)
RBC #/AREA URNS HPF: >30 /HPF (ref 0–2)
SODIUM SERPL-SCNC: 141 MMOL/L (ref 136–145)
SP GR UR STRIP: 1.02 (ref 1–1.03)
TRIGL SERPL-MCNC: 103 MG/DL (ref 0–150)
TSH SERPL DL<=0.005 MIU/L-ACNC: 2.7 UIU/ML (ref 0.27–4.2)
URINALYSIS REFLEX: ABNORMAL
UROBILINOGEN UR STRIP-MCNC: 0.2 MG/DL (ref 0.2–1)
VLDLC SERPL CALC-MCNC: 18 MG/DL (ref 5–40)
WBC # BLD AUTO: 7.97 10*3/MM3 (ref 3.4–10.8)
WBC #/AREA URNS HPF: >30 /HPF (ref 0–5)

## 2024-09-16 ENCOUNTER — TELEPHONE (OUTPATIENT)
Dept: INTERNAL MEDICINE | Facility: CLINIC | Age: 64
End: 2024-09-16
Payer: MEDICARE

## 2024-09-18 ENCOUNTER — OFFICE VISIT (OUTPATIENT)
Dept: WOUND CARE | Facility: HOSPITAL | Age: 64
End: 2024-09-18
Payer: MEDICARE

## 2024-09-23 ENCOUNTER — HOSPITAL ENCOUNTER (OUTPATIENT)
Dept: NUCLEAR MEDICINE | Facility: HOSPITAL | Age: 64
Discharge: HOME | End: 2024-09-23
Payer: MEDICARE

## 2024-09-23 ENCOUNTER — HOSPITAL ENCOUNTER (OUTPATIENT)
Dept: CT IMAGING | Facility: HOSPITAL | Age: 64
Discharge: HOME | End: 2024-09-23
Admitting: INTERNAL MEDICINE
Payer: MEDICARE

## 2024-09-23 DIAGNOSIS — R30.0 DYSURIA: ICD-10-CM

## 2024-09-23 DIAGNOSIS — C50.811 MALIGNANT NEOPLASM OF OVERLAPPING SITES OF RIGHT BREAST IN FEMALE, ESTROGEN RECEPTOR NEGATIVE: ICD-10-CM

## 2024-09-23 DIAGNOSIS — Z17.1 MALIGNANT NEOPLASM OF OVERLAPPING SITES OF RIGHT BREAST IN FEMALE, ESTROGEN RECEPTOR NEGATIVE: ICD-10-CM

## 2024-09-23 PROCEDURE — 74177 CT ABD & PELVIS W/CONTRAST: CPT

## 2024-09-23 PROCEDURE — 0 DIATRIZOATE MEGLUMINE & SODIUM PER 1 ML: Performed by: INTERNAL MEDICINE

## 2024-09-23 PROCEDURE — 71260 CT THORAX DX C+: CPT

## 2024-09-23 PROCEDURE — 25510000001 IOPAMIDOL 61 % SOLUTION: Performed by: INTERNAL MEDICINE

## 2024-09-23 PROCEDURE — 0 TECHNETIUM MEDRONATE KIT: Performed by: INTERNAL MEDICINE

## 2024-09-23 PROCEDURE — 78306 BONE IMAGING WHOLE BODY: CPT

## 2024-09-23 PROCEDURE — A9503 TC99M MEDRONATE: HCPCS | Performed by: INTERNAL MEDICINE

## 2024-09-23 RX ORDER — IOPAMIDOL 612 MG/ML
85 INJECTION, SOLUTION INTRAVASCULAR
Status: COMPLETED | OUTPATIENT
Start: 2024-09-23 | End: 2024-09-23

## 2024-09-23 RX ORDER — DIATRIZOATE MEGLUMINE AND DIATRIZOATE SODIUM 660; 100 MG/ML; MG/ML
30 SOLUTION ORAL; RECTAL
Status: COMPLETED | OUTPATIENT
Start: 2024-09-23 | End: 2024-09-23

## 2024-09-23 RX ORDER — TC 99M MEDRONATE 20 MG/10ML
20.5 INJECTION, POWDER, LYOPHILIZED, FOR SOLUTION INTRAVENOUS
Status: COMPLETED | OUTPATIENT
Start: 2024-09-23 | End: 2024-09-23

## 2024-09-23 RX ADMIN — DIATRIZOATE MEGLUMINE AND DIATRIZOATE SODIUM 30 ML: 660; 100 LIQUID ORAL; RECTAL at 12:19

## 2024-09-23 RX ADMIN — IOPAMIDOL 85 ML: 612 INJECTION, SOLUTION INTRAVENOUS at 12:19

## 2024-09-23 RX ADMIN — Medication 20.5 MILLICURIE: at 10:59

## 2024-09-26 ENCOUNTER — OFFICE VISIT (OUTPATIENT)
Dept: WOUND CARE | Facility: HOSPITAL | Age: 64
End: 2024-09-26
Payer: MEDICARE

## 2024-09-26 ENCOUNTER — HOSPITAL ENCOUNTER (OUTPATIENT)
Dept: BONE DENSITY | Facility: HOSPITAL | Age: 64
Discharge: HOME | End: 2024-09-26
Payer: MEDICARE

## 2024-09-26 PROCEDURE — 77080 DXA BONE DENSITY AXIAL: CPT

## 2024-09-27 NOTE — PROGRESS NOTES
Chief Complaint  Previous Stage Ib ([id number]) ER/NH positive, HER-2/peter negative right breast cancer with subsequent metastatic disease identified 10/8/2017, history of right pulmonary embolism, cancer related pain, chemotherapy-induced diarrhea, chemotherapy-induced mucositis, chemotherapy-induced hand-foot syndrome    Subjective        History of Present Illness  The patient returns today in follow-up with laboratory studies, CT scans, bone scan to review continuing on oral Xeloda 1000 mg in the morning, 1500 mg in the evening for 7 days on followed by 7 days off as well as every 3-month Xgeva (due today) and Eliquis 5 mg twice daily.  The patient has been maintained on treatment with single agent Xeloda since 2/7/2018.  She does continue with chronic side effects from treatment including hand-foot syndrome and sclerodactyly.  She continues to use emollient cream frequently 4-5 times per day as well as topical triamcinolone intermittently (2 weeks on followed by 2 weeks off as instructed by dermatology).  She believes that her symptoms are stable, the erythema on the dorsal aspect of her hands has diminished.  She continues with dry, cracked skin on her palms and some chronic degree of sclerodactyly which does affect her dexterity but is overall manageable by her report.  She has much less effect on her plantar surface.  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  She has had some intermittent wounds on her feet from the braces and irritation.  She had a recent wound on the lateral aspect of her left foot which did require attention from wound care clinic with debridement a few weeks ago.  She reports that the area is now healing properly.  She is being referred to a new podiatrist.  She has also continued with frequent urinary tract infections and is now on prophylactic cephalexin daily.  She is being referred to a new urogynecologist.  She was  "prescribed topical Estrace to use as well.  She notes that her level of activity in terms of walking has decreased slightly recently with the foot wound but she is trying to get back to her previous level of walking for exercise.  She reports minimal difficulty with diarrhea from Xeloda, requires treatment with Imodium every few weeks.  Her appetite is normal, weight stable at 184 pounds.  Patient does continue follow-up with supportive oncology.  She is also continuing with grief counseling regarding the death of her  earlier this year.  She feels that she is coping reasonably well at this point.  Her sons are doing reasonably well, her middle son having some difficulty ongoing and is continuing with grief counseling through hospice as well.      Objective   Vital Signs:   /68   Pulse 95   Temp 98.7 °F (37.1 °C) (Oral)   Resp 16   Ht 154.9 cm (60.98\")   Wt 83.7 kg (184 lb 8 oz)   SpO2 97%   BMI 34.88 kg/m²     Physical Exam  Constitutional:       Appearance: She is well-developed.      Comments: Patient ambulates with the use of a cane   Eyes:      Conjunctiva/sclera: Conjunctivae normal.   Neck:      Thyroid: No thyromegaly.   Cardiovascular:      Rate and Rhythm: Normal rate and regular rhythm.      Heart sounds: No murmur heard.     No friction rub. No gallop.   Pulmonary:      Effort: No respiratory distress.      Breath sounds: Normal breath sounds.   Abdominal:      General: Bowel sounds are normal. There is no distension.      Palpations: Abdomen is soft.      Tenderness: There is no abdominal tenderness.   Musculoskeletal:      Comments: Braces in place in the bilateral lower extremities   Lymphadenopathy:      Head:      Right side of head: No submandibular adenopathy.      Cervical: No cervical adenopathy.      Upper Body:      Right upper body: No supraclavicular adenopathy.      Left upper body: No supraclavicular adenopathy.   Skin:     General: Skin is warm and dry.      Findings: " Rash present.      Comments: Erythema involving the palms has improved and is minimal.  There is minimal residual erythema in the dorsal aspect of the hands.  There is no significant erythema in the palmar surfaces however there is significant skin cracking, no current peeling.  There is sclerodactyly which is fairly stable from her last exam.   Neurological:      Mental Status: She is alert and oriented to person, place, and time.      Cranial Nerves: No cranial nerve deficit.      Motor: No abnormal muscle tone.      Deep Tendon Reflexes: Reflexes normal.   Psychiatric:         Behavior: Behavior normal.           Result Review : Reviewed CBC, CMP, magnesium, phosphorus from today.  Reviewed CT chest abdomen pelvis and bone scan from 9/23/2024.  Reviewed PCP records.  Reviewed wound care records.     Assessment and Plan     *Previous Stage Ib ([id number]) right breast cancer:  Diagnosed May 2010 with excisional biopsy for invasive ductal carcinoma, 1.3 cm, grade 2, ER 90%, MT 80%, HER-2/peter negative (1+ IHC).    Subsequent right mastectomy in July 2010 with no residual breast malignancy, 1/5 sentinel lymph node with micrometastasis (0.25 mm).    Treated in the [facility] system with adjuvant AC ×4 cycles in 2010 (no taxanes administered due to underlying Charcot-Saloni-Tooth with peripheral neuropathy).    Adjuvant Femara (postmenopausal) initiated October 2010 with plan to treat ×10 years.    Genetic testing reportedly negative.    Developed osteopenia treated with Prolia beginning 2/27/13. Subsequently discontinued due to identification of metastatic disease.    *Recurrent/metastatic disease identified 10/8/17:  Disease involving thoracic spine with cord compression at T6, lumbosacral involvement, sternal and right sternoclavicular involvement.    Femara discontinued in 10/2017.    Radiation administered (in the [facility] system) to the thoracic spine beginning 10/19/17 treating T3-T9 to a dose of 24 gray in 6  fractions.  Evaluation with MRI 12/8/17 showing persistent T6 cord compression with persistent neurologic compromise requiring surgical treatment 12/11/17 with T6 laminectomy/corpectomy and T3-T9 fusion.  Pathology with metastatic carcinoma of breast origin, ER negative, WV negative, HER-2/peter negative (1+ IHC).  Additional staging evaluation 12/8/17 with no evidence of visceral metastatic disease, bone scan showing involvement of thoracic spine, sternum, left humerus, mid frontal bone.  No plane film correlate of left humerus lesion.  MRI lumbar spine with small intradural L3 metastasis.  CA 15-3 12/6/17- 17.  Palliative radiation therapy to L3 dural metastasis and left humerus initiated 1/15/18 (10 fractions), completed 1/26/18.  Hypercalcemia of malignancy with calcium in the 10-11 range.  Initiation of monthly Xgeva 1/23/18.  Baseline CT scan 1/30/18 with no evidence of visceral involvement.  Cluster of nodular opacities in the right lower lobe suspected to be infectious or related to bronchiolitis. Bone scan 1/30/18 showed postsurgical change in the thoracic spine, stable uptake in the frontal bone, no new areas of disease.  Initiation of palliative oral single agent Xeloda 2/7/18 2000 mg a.m., 1500 mg p.m. for 14/21 days.   Following 3 cycles xeloda, bone scan 4/4/18 showed no change from the prior study.  CT scan 4/4/18 showed a small pericardial effusion of unclear significance as well as a subcutaneous nodule in the right lateral chest wall.  Subsequent evaluation with echocardiogram 4/17/18 showed no evidence of pericardial effusion.  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  We discussed the possibility of surgical excision of the right subcutaneous chest wall lesion for more definitive diagnosis.  Reviewed previous CT images dating back to 12/8/17 and the  lesion was present even at that time measuring around 1.7 cm although not commented on in the radiology report.  As this appears to be an indolent low-grade process unrelated to her breast cancer, recommendee foregoing surgical excision at this time and monitoring this area on future scans.  The patient agreed.    Following 6 cycles of Xeloda, CT 6/6/18  showed stable findings, no evidence of progressive disease.  There was a comment regarding subcutaneous abnormality in the anterior abdominal wall and this was related to Lovenox injection sites.  Bone scan 6/6/18 showed no interval change.   CT scan and bone scan 8/13/18 following 9 cycles of Xeloda showed stable findings with no evidence of significant visceral metastases.  Her bone lesions appear stable on bone scan.  The spindle cell neoplasm in her right chest wall actually decreased in size from 2 cm down to 1.6 cm.    The patient experienced some symptoms of diarrhea, anorexia, generalized weakness during cycle 9 Xeloda it was unclear whether this was related to a viral gastroenteritis or toxicity from treatment.  Symptoms recurred during cycle 10 and treatment was cut short by 2 days.  Symptoms attributed to Xeloda.  With cycle 11, dose and schedule altered to 1500 mg twice daily for 7 days on followed by 7 days off .  CT scan 9/9/2020 with no significant changes.  Bone scan 9/9/2020 read as unchanged from prior studies however did note an area of slight activity in the medial left femur.  Contacted radiology and although this was not noted on prior reports appears to have been present.  Subsequent MRI left femur 9/21/2020 with cortical thickening and periosteal edema left iliac us muscle insertion to the medial left femur with no evidence of metastatic disease, favored to represent periosteal change secondary to insertional tendinitis.  Severe hand-foot symptoms causing sclerodactyly and limitation in finger movement prompted change in dosing in July 2021  with Xeloda decreased to 1000 mg a.m., 1500 mg p.m. for 7 days on followed by 7 days off.  Patient was experiencing severe hand-foot syndrome related to Xeloda having developed skin peeling, sclerodactyly with limited use of her hands.  On 3/31/2022, Xeloda was held to allow for recovery.  Patient resumed Xeloda on 4/18/2022.  Patient required hospitalization 5/29 - 6/1/2022 with presumed viral gastroenteritis that was exacerbated by Xeloda.  Xeloda held briefly, resumed on 6/6/2022.  Patient again with worsening sclerodactyly, Xeloda held for 2 weeks from 10/3 - 10/17/2022, resumed at prior dose with improvement in symptoms.  Scans on 3/3/2023 with CT chest abdomen pelvis and bone scan showing stable findings.  CT chest abdomen pelvis 7/3/2023 with questionable 1 mm change in size right lower lobe nodule.  Additional small pulmonary nodules stable.  Stable bony metastatic disease.  Bone scan on 7/7/2023 however showed slight progression focal involvement right ischium and superior pubic ramus (new ischial lesion superior pubic ramus compared to 10/24/2022 and more conspicuous compared to 3/3/2023).  Metastatic lesion right inferior pubic ramus and ischial tuberosity increased in size since October 2022 however stable from 3/3/2023.  MRI cervical spine 7/3/2023 with no metastatic involvement however identification of the lesion at T2, recommended dedicated thoracic spine MRI to evaluate further.  Given the minimal extent and slow degree of progression, elected to continue oral Xeloda and evaluate further with MRI pelvis and thoracic spine.  Consideration of biopsy pelvic metastasis for repeat ER/[place]/HER2/peter testing.    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  CA 15-3 on 7/10/2023 was 12.2, uninformative.  MRI thoracic spine 7/27/2023 with 1 cm metastatic focus seen in L1  MRI pelvis 7/27/2023 with metastatic foci identified right superior pubic ramus, right  ischial tuberosity, inferior pubic ramus, L5 body.  Chronic appearing posttraumatic and/or degenerative change in the posterior right ilium adjacent SI joint.  CT-guided biopsy right pubic ramus lesion on 8/31/2023.  Pathology showed no evidence of malignancy, showed markedly calcified and sclerotic mature lamellar bone.  Attempted decalcification but no evidence of malignancy.  CT chest abdomen pelvis 9/8/2023 and bone scan 9/11/2023 with stable findings.  CT chest abdomen pelvis 12/8/2023 with possible slight increase in right chest wall subcutaneous lesion (1.8 x 1.1 up to 1.8 x 1.4 cm) although may be related to altered positioning.  Nonpathologically enlarged right axillary and subpectoral lymph nodes slightly increased (patient notes she received RSV vaccine right arm just prior to scan).  Bone scan 12/11/2023 with stable findings.  CT chest abdomen pelvis 3/19/2024 with slight interval decrease in size of right axillary and bilateral subpectoral lymph nodes, right subcutaneous soft tissue nodule slightly smaller, stable pulmonary nodules, stable bone metastases.  Bone scan 3/19/2024 with stable findings.  CT chest abdomen pelvis 6/21/2024 showed left subpectoral lymph node to have increased slightly in size (0.9 x 0.6 up to 1.3 x 0.8 cm).  Subcutaneous nodule right chest wall decreased from 1.1 x 1.9 down to 1.1 x 1.4 cm.  Scan otherwise stable.  Bone scan 6/21/2024 with stable findings  CT chest abdomen pelvis 9/23/2024 with resolution of left subpectoral lymphadenopathy, additional findings stable.  Bone scan 9/23/2024 with stable findings.  The patient returns today continuing on treatment with Xeloda 1000 mg a.m., 1500 mg p.m. 7 days on followed by 7 days off.  She is continuing on Xgeva every 3 months, due today.  She has been on single agent Xeloda since 2/7/2018.  Her scans from 9/23/2024 showed stable findings, resolution of borderline left subpectoral lymphadenopathy.  She continues to tolerate  treatment reasonably well.  Hand-foot syndrome does persist however symptoms have actually improved somewhat over time with resolution of erythema that had been extending onto the dorsal aspect of her hands, resolution of the erythema involving her palmar surface, no current skin peeling although there is significant skin scaling and cracking.  This does produce some degree of sclerodactyly which is fairly stable, does affect her dexterity to some extent although she feels that this is manageable.  We will plan to continue on with treatment without alteration in her dose or schedule.  She will return here in 6 weeks for NP visit.  I will see her in 14 weeks (2 weeks delayed due to the holidays) when she will be due for Xgeva again.  We discussed trying to lengthen out interval for subsequent follow-up scans to 4 to 5 months and we will discuss this when I see her in 14 weeks.  It is noted that the patient was started on Estrace cream by her PCP due to recurrent UTIs.  This should have little systemic absorption.  On previous biopsy, her disease was noted to be ER/HI negative, unclear whether this was a true negative result or was related to false negative result due to decalcification required for bone tissue processing.    *Right pulmonary embolism:  Diagnosed on CT angiogram 10/21/17 involving small right lower lobe pulmonary artery.  Lower extremity Dopplers negative.  Bilateral lower extremity Dopplers negative again 12/5/17.  Received chronic Lovenox 1 mg/kg twice per day, transitioned to oral Eliquis in February 2019, continuing on Eliquis 5 mg twice daily.  Patient continues on Eliquis 5 mg twice daily    *Cancer related pain:  Previously receiving Duragesic 50 µg patch every 72 hours along with Dilaudid 4 mg as needed for breakthrough pain  The patient's pain improved over time and she was able to discontinue both Duragesic and Dilaudid in the interval.  Patient does take occasional Flexeril at bedtime due  to back spasm/pain when she has been more active.  The patient does have some occasional aggravation of her chronic back pain and does use tramadol 50 mg every 8 hours as needed  Patient was receiving tramadol 50 mg every 8-12 hours as needed in addition to hydrocodone 5/325 every 4 hours as needed.  She was also taking OTC NSAIDs.  Patient developed nausea related to hydrocodone and was transitioned to Percocet 5/325 every 4 hours as needed, advised to stop taking NSAIDs with ongoing anticoagulation.  Patient notes pain in her shoulders that does wax and wane is unrelated to her malignancy.  She does continue on tramadol 50 mg every 8 hours and Percocet 5/325 every 4 hours as needed    *Chemotherapy-induced diarrhea with subsequent C. difficile colitis in the setting of previous ulcerative colitis and then identification of enteropathogenic E. coli:  Patient with reported history of ulcerative colitis, continues on mesalamine.  The patient developed initial diarrhea related to Xeloda at regular dosing.  Symptoms improved on reduced dose Xeloda  Flare of symptoms in October 2018 with apparent finding of C. difficile colitis by GI, treated with course of oral vancomycin with improvement in symptoms.  Patient notes minimal intermittent diarrhea/loose stools on Xeloda requiring occasional dosing of Imodium.    Patient required hospitalization 5/29 - 6/1/2022 with presumed viral gastroenteritis that was exacerbated by Xeloda.  Xeloda held briefly, resumed on 6/6/2022.  Patient's  developed C. difficile colitis and patient was experiencing increase in diarrhea.  Stool studies performed 8/4/22 negative C. difficile however GI PCR was positive for enteropathogenic E. coli.  Given patient's symptoms and immunocompromise status it was elected to treat her with azithromycin 500 mg daily x3 days beginning 8/5/2022  Diarrhea resolved  Patient underwent colonoscopy on 2/28/2023 with findings of diverticulosis  Patient  notes fairly infrequent diarrhea.  Every few weeks and requires minimal dosing of Imodium.    *Traumatic left tibia/fibular fracture:  Status post ORIF 12/6/17  Specimen was sent for pathologic review, negative for evidence of malignancy    *Hypercalcemia:  Suspect hypercalcemia of malignancy, calcium in  10-11 range previously.  Calcium normalized following initiation of monthly Xgeva on 1/23/18.    *Chemotherapy-induced mucositis:  Patient has not experienced any recent difficulty with mucositis.    *Recurrent UTI, bladder wall thickening on CT:  Patient had an enterococcal UTI on 3/2/18 sensitive to nitrofurantoin and received treatment, unclear how long.  Recurrent UTI 3/20/18 with urine culture growing Klebsiella, initially treated with nitrofurantoin, transitioned to Levaquin.  CT 4/4/18 with diffuse bladder wall thickening with increased nodular thickening at the left base.  Referral to urogynecology Dr. May Johnson.  She was placed on a prophylactic dose of trimethoprim 100 mg daily, bladder wall thickening felt to be related to recent recurrent urinary tract infections.  Patient with urinary symptoms, treated with course of Macrobid at the end of December 2018, urine culture however was negative 12/31/18.  Patient was found to have Klebsiella UTI 7/29/2019 which was successfully treated with Macrobid with complete resolution of symptoms.  CT 8/10/2021 with diffuse bladder wall thickening new from 5/17/2021 (however seen on multiple prior scans).    UTI on 10/18/2021 with E. coli greater than 100,000 colonies that was pansensitive, treated with Macrobid x7 days  With ongoing/recurrent UTIs patient was seen by urogynecology and initiated suppressive therapy with trimethoprim 100 mg daily in December 2021.  She has not experienced any further urinary tract infections.  CT abdomen pelvis 3/28/2022 does show diffuse thickening of urinary bladder as has been seen on prior studies indicative of cystitis.  Patient  notes that she did stop taking trimethoprim on a daily basis.    Patient with urine culture on 5/5/2023 that grew E. coli and she received antibiotic treatment from urogynecology.    Urine culture on 8/23/2023 with greater than 100,000 colonies of E. coli resistant to ampicillin and Bactrim.  Received 5-day course of Cipro with resolution of symptoms.  Patient did have UTI with E. coli on culture 10/2/2023, received a course of Augmentin.  Patient with ongoing intermittent urinary tract infections.  She has been placed on prophylactic dose of Keflex daily per her PCP and was referred to a new urogynecologist.  She was also prescribed Estrace cream (see above discussion).    *Charcot-Saloni-Tooth with mobility difficulties:  The patient underwent an intensive course of rehabilitation at [facility].  She graduated from her outpatient course November 2018.  Overall the patient has improved dramatically in terms of mobility,   Patient developed significant callus formation lateral aspect of the left plantar surface.    Patient has developed skin erosion and an ulceration on the lateral aspect of her left foot.  She has been seen recently by wound care and is being referred to a new podiatrist.  She is hopefully obtaining a new brace.    *Depression:  The patient is continuing follow-up in the supportive oncology clinic and is currently continuing on Cymbalta 30 mg twice daily as well as gabapentin 900 mg nightly, Ativan 0.5 mg nightly as needed, Provigil 100 mg daily.  Patient does continue to attend support groups at [employer].  The patient does continue routine follow-up in supportive oncology clinic.    Patient does have multiple stressors with her 's malignancy and chemotherapy as well as her autistic son who is living with them at home.  Patient reports that she continues to deal with grief related to her 's death this past year.  She continues to see a grief counselor through hospice which has helped  significantly.  She continues routine follow-up with supportive oncology as well.    *Hand-foot syndrome secondary to Xeloda:  Patient continues with frequent application of emollient cream to the hands and feet  Symptoms increased significantly requiring a 1 week delay in cycle 18 Xeloda as noted above.  Symptoms did improve and she continued on the same dose.    Patient was referred to dermatology and has been continuing on triamcinolone 0.1% cream used for 1 week on followed by 1 week off which led to some further improvement.   Progressive palmar erythema with development of sclerodactyly and effect on dexterity.  Addition of urea-based cream 3 times daily.  Patient with continued difficulty regarding erythema of her hands that extended onto the dorsal aspect and was causing contractures/sclerodactyly in her fingers affecting her dexterity.  In July 2021, held Xeloda for an additional week and then reduced dose from 1500 mg twice daily down to 1000 mg a.m. and 1500 mg p.m. and continued on a 7-day on followed by 7-day off schedule.  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  On 3/31/2022, Xeloda was held to allow for recovery.  Patient resumed Xeloda on 4/18/2022.  Patient developed worsening sclerodactyly affecting dexterity that required Xeloda to again be briefly held for 2 weeks from 10/3 - 10/17/2022.  Treatment resumed at prior dose after improvement in symptoms.  Patient continues to use emollient cream frequently (currently 5 times daily) and topical triamcinolone 0.1% twice daily intermittently (2 weeks on followed by 2 weeks off as instructed by dermatology).  Patient with overall improvement in involvement of the hands, no current erythema but continued scaling and  cracking of the skin on the palmar surface.  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  She feels that symptoms are tolerable.  Symptoms involving the feet are significantly less than the hands.  Unclear to what extent Xeloda is also contributing to patient's left foot wound although apparently that is healing well currently.    *Evidence of steatosis on scans with mild intermittent elevated liver function studies:  Liver function studies increased 8/20/19 with ALT 98, AST 70, normal total bilirubin.  Negative viral hepatitis A, B, and C panel 8/23/18  Likely related to hepatic steatosis.   Subsequent improvement in LFTs  LFTs today are normal    *Chemotherapy induced leukopenia:  WBC today is normal at 5.27    *GERD, question of celiac disease:  Patient with significant history of reflux  Patient currently receiving Protonix 40 mg daily daily and Carafate 1 g 4 times daily as needed  Patient required hospitalization 5/29 - 6/1/2022 with presumed viral gastroenteritis that was exacerbated by Xeloda.    EGD performed 5/31/2022 during hospitalization with biopsy that showed focal blunting of villi and atrophy with slight increase in intraepithelial lymphocytes.  Changes were minimal but recommended correlation with serology to exclude celiac disease.  Celiac serology 8/8/2022 negative  Patient previously developed worsening reflux symptoms, temporarily increase Protonix to 40 mg twice daily and we did add Carafate 1 g 4 times daily.    She is taking Protonix 40 mg once daily, is not taking Carafate.    *Insomnia:  Patient with prior paradoxical reaction to Benadryl  Improved previously on temazepam 15 mg nightly as needed.   Patient noted subsequently that temazepam was having no effect.   Patient continuing on gabapentin 900 mg nightly    *Health maintenance:  Patient notes that she has a history of colon polyps as well as ulcerative colitis and was due for  a follow-up colonoscopy on 9/12/2019.  We did discuss there is no necessity to pursue colonoscopy in the setting of her metastatic breast cancer.    The patient did undergo colonoscopy on 2/7/2020 with findings of muscular hypertrophy and diverticulosis.   Patient did wish to proceed with further colonoscopic evaluation, performed on 12/20/2022 with poor prep.  Repeat 2/28/2023 with diverticulosis.    *Bilateral shoulder pain:  Chronic difficulty with right shoulder although she has not complained of this in prior visits to our office.  She reported difficulty with abduction.    The patient did undergo MRI of her right shoulder on 11/21/2019 at an outside facility showing multiple abnormalities including supraspinatus tendinosis, labral tear but no evidence of metastatic disease.  Patient developed pain in the left shoulder, was seen by orthopedics and underwent steroid injection with some improvement.  Right shoulder stable.  Patient did undergo physical therapy with some improvement.  She continues to use tramadol 50 mg every 8-12 hours as needed.  Note that current CT 10/20/2022 made notation of left shoulder probable bursitis.    Patient continues with bilateral shoulder pain, left greater than right which does wax and wane.      *Ocular changes in part related to Xeloda:  Patient experienced a mild degree of blurred vision as well as burning and pruritus.  She was seen by her ophthalmologist and was placed on xiidra ophthalmic drops which have helped.  Likely both issues are to some extent related to Xeloda.  Symptoms did worsen and patient was seen by ophthalmologist at [facility].  She is now using an ocular lubricant at night in addition to artificial tears which have helped.  Symptoms currently stable to improved    *Elevated glucose:  It is noted the patient's glucose at the last few visits has been in the high 100 range, postprandial.  She has had a hemoglobin A1c that has been in the high  5-6 range in the past  Hemoglobin A1c was last checked on 9/9/2024 at 5.9    *Possible loosening of pedicle screw at T3:  CT cervical spine 5/17/2021 showed loosening of the left pedicle screw at T3.  Patient referred to orthopedics and was seen by Dr. Nayak who felt that there were no concerning findings on the scan    *Iron deficiency anemia  Labs on 5/2/2022 with hemoglobin 10.8.  Additional labs with iron 48, ferritin 21.2, iron saturation 11%, TIBC 4 and 32, folate greater than 20, B12 greater than 2000.    Attempted treatment with oral iron daily however patient was intolerant  Patient subsequently received Venofer 300 mg weekly x4 beginning 6/6/2022 and completed on 6/27/2022  Subsequent iron studies on 7/11/2022 showed improvement with iron 62, ferritin 345, iron saturation 18%, TIBC 336.  Hemoglobin had improved up to 12.7 at that time.  Repeat iron studies on 10/3/2022 showed iron replete status with hemoglobin 13.7, iron 121, ferritin 208.7, iron saturation 31%, TIBC 392.  Hemoglobin currently normal at 12.7     Plan:  Continue palliative single agent Xeloda 1000 mg a.m., 1500 mg in the p.m. 7 days on followed by 7 days off (patient has been on single agent Xeloda since 2/7/2018)  Proceed with every 3-month Xgeva today  Continue Eliquis 5 mg twice daily  The patient will continue frequent use of emollient cream currently 5 times daily and continue periodic triamcinolone cream 0.1% twice daily (provided by dermatology) 2 weeks on followed by 2 weeks off as prescribed  Continue Protonix 40 mg daily  Continue ocular lubricating gel nightly per ophthalmology  Continue Provigil 100 mg daily and Cymbalta 30 mg twice daily.  Patient continues routine follow-up with supportive oncology   Patient will continue gabapentin 900 mg at night.   Patient continues on tramadol 50 mg every 8 hours as needed for pain  Continue Percocet 5/325 every 4 hours as needed for pain  Patient continues with grief counseling with  hospice in regards to recent death of her   In 6 weeks NP visit with CBC, CMP  MD visit in 14 weeks (2 weeks delayed due to holidays) with CBC, CMP, magnesium, phosphorus.  Patient will be due for Xgeva.  We will plan to perform CT scans approximately 4 months from current studies and we will order these at return visit.     Patient continuing on high risk medication requiring intensive monitoring.       I did spend 40 minutes in total time caring for the patient today, time spent in review of records, face-to-face consultation, coordination of care, placement of orders, completion of documentation.

## 2024-09-30 ENCOUNTER — OFFICE VISIT (OUTPATIENT)
Dept: ONCOLOGY | Facility: CLINIC | Age: 64
End: 2024-09-30
Payer: MEDICARE

## 2024-09-30 ENCOUNTER — LAB (OUTPATIENT)
Dept: OTHER | Facility: HOSPITAL | Age: 64
End: 2024-09-30
Payer: MEDICARE

## 2024-09-30 ENCOUNTER — INFUSION (OUTPATIENT)
Dept: ONCOLOGY | Facility: HOSPITAL | Age: 64
End: 2024-09-30
Payer: MEDICARE

## 2024-09-30 VITALS
SYSTOLIC BLOOD PRESSURE: 106 MMHG | WEIGHT: 184.5 LBS | HEIGHT: 61 IN | HEART RATE: 95 BPM | TEMPERATURE: 98.7 F | BODY MASS INDEX: 34.83 KG/M2 | RESPIRATION RATE: 16 BRPM | DIASTOLIC BLOOD PRESSURE: 68 MMHG | OXYGEN SATURATION: 97 %

## 2024-09-30 DIAGNOSIS — C50.811 MALIGNANT NEOPLASM OF OVERLAPPING SITES OF RIGHT BREAST IN FEMALE, ESTROGEN RECEPTOR NEGATIVE: Primary | ICD-10-CM

## 2024-09-30 DIAGNOSIS — Z17.1 MALIGNANT NEOPLASM OF OVERLAPPING SITES OF RIGHT BREAST IN FEMALE, ESTROGEN RECEPTOR NEGATIVE: ICD-10-CM

## 2024-09-30 DIAGNOSIS — Z17.1 MALIGNANT NEOPLASM OF OVERLAPPING SITES OF RIGHT BREAST IN FEMALE, ESTROGEN RECEPTOR NEGATIVE: Primary | ICD-10-CM

## 2024-09-30 DIAGNOSIS — C50.811 MALIGNANT NEOPLASM OF OVERLAPPING SITES OF RIGHT BREAST IN FEMALE, ESTROGEN RECEPTOR NEGATIVE: ICD-10-CM

## 2024-09-30 LAB
ALBUMIN SERPL-MCNC: 3.9 G/DL (ref 3.5–5.2)
ALBUMIN/GLOB SERPL: 1.4 G/DL
ALP SERPL-CCNC: 70 U/L (ref 39–117)
ALT SERPL W P-5'-P-CCNC: 17 U/L (ref 1–33)
ANION GAP SERPL CALCULATED.3IONS-SCNC: 8.8 MMOL/L (ref 5–15)
AST SERPL-CCNC: 25 U/L (ref 1–32)
BASOPHILS # BLD AUTO: 0.09 10*3/MM3 (ref 0–0.2)
BASOPHILS NFR BLD AUTO: 1.7 % (ref 0–1.5)
BILIRUB SERPL-MCNC: 0.4 MG/DL (ref 0–1.2)
BUN SERPL-MCNC: 30 MG/DL (ref 8–23)
BUN/CREAT SERPL: 25.6 (ref 7–25)
CALCIUM SPEC-SCNC: 9.6 MG/DL (ref 8.6–10.5)
CHLORIDE SERPL-SCNC: 101 MMOL/L (ref 98–107)
CO2 SERPL-SCNC: 31.2 MMOL/L (ref 22–29)
CREAT SERPL-MCNC: 1.17 MG/DL (ref 0.57–1)
DEPRECATED RDW RBC AUTO: 65.3 FL (ref 37–54)
EGFRCR SERPLBLD CKD-EPI 2021: 52.5 ML/MIN/1.73
EOSINOPHIL # BLD AUTO: 0.24 10*3/MM3 (ref 0–0.4)
EOSINOPHIL NFR BLD AUTO: 4.6 % (ref 0.3–6.2)
ERYTHROCYTE [DISTWIDTH] IN BLOOD BY AUTOMATED COUNT: 17.3 % (ref 12.3–15.4)
GLOBULIN UR ELPH-MCNC: 2.8 GM/DL
GLUCOSE SERPL-MCNC: 134 MG/DL (ref 65–99)
HCT VFR BLD AUTO: 39.5 % (ref 34–46.6)
HGB BLD-MCNC: 12.7 G/DL (ref 12–15.9)
IMM GRANULOCYTES # BLD AUTO: 0.01 10*3/MM3 (ref 0–0.05)
IMM GRANULOCYTES NFR BLD AUTO: 0.2 % (ref 0–0.5)
LYMPHOCYTES # BLD AUTO: 1.46 10*3/MM3 (ref 0.7–3.1)
LYMPHOCYTES NFR BLD AUTO: 27.7 % (ref 19.6–45.3)
MAGNESIUM SERPL-MCNC: 2.1 MG/DL (ref 1.6–2.4)
MCH RBC QN AUTO: 32.7 PG (ref 26.6–33)
MCHC RBC AUTO-ENTMCNC: 32.2 G/DL (ref 31.5–35.7)
MCV RBC AUTO: 101.8 FL (ref 79–97)
MONOCYTES # BLD AUTO: 0.57 10*3/MM3 (ref 0.1–0.9)
MONOCYTES NFR BLD AUTO: 10.8 % (ref 5–12)
NEUTROPHILS NFR BLD AUTO: 2.9 10*3/MM3 (ref 1.7–7)
NEUTROPHILS NFR BLD AUTO: 55 % (ref 42.7–76)
NRBC BLD AUTO-RTO: 0 /100 WBC (ref 0–0.2)
PHOSPHATE SERPL-MCNC: 4.2 MG/DL (ref 2.5–4.5)
PLATELET # BLD AUTO: 279 10*3/MM3 (ref 140–450)
PMV BLD AUTO: 10.4 FL (ref 6–12)
POTASSIUM SERPL-SCNC: 4.5 MMOL/L (ref 3.5–5.2)
PROT SERPL-MCNC: 6.7 G/DL (ref 6–8.5)
RBC # BLD AUTO: 3.88 10*6/MM3 (ref 3.77–5.28)
SODIUM SERPL-SCNC: 141 MMOL/L (ref 136–145)
WBC NRBC COR # BLD AUTO: 5.27 10*3/MM3 (ref 3.4–10.8)

## 2024-09-30 PROCEDURE — 99215 OFFICE O/P EST HI 40 MIN: CPT | Performed by: INTERNAL MEDICINE

## 2024-09-30 PROCEDURE — 85025 COMPLETE CBC W/AUTO DIFF WBC: CPT | Performed by: INTERNAL MEDICINE

## 2024-09-30 PROCEDURE — 1159F MED LIST DOCD IN RCRD: CPT | Performed by: INTERNAL MEDICINE

## 2024-09-30 PROCEDURE — 1125F AMNT PAIN NOTED PAIN PRSNT: CPT | Performed by: INTERNAL MEDICINE

## 2024-09-30 PROCEDURE — 96372 THER/PROPH/DIAG INJ SC/IM: CPT

## 2024-09-30 PROCEDURE — 80053 COMPREHEN METABOLIC PANEL: CPT | Performed by: INTERNAL MEDICINE

## 2024-09-30 PROCEDURE — 3078F DIAST BP <80 MM HG: CPT | Performed by: INTERNAL MEDICINE

## 2024-09-30 PROCEDURE — 3074F SYST BP LT 130 MM HG: CPT | Performed by: INTERNAL MEDICINE

## 2024-09-30 PROCEDURE — 83735 ASSAY OF MAGNESIUM: CPT | Performed by: INTERNAL MEDICINE

## 2024-09-30 PROCEDURE — 25010000002 DENOSUMAB 120 MG/1.7ML SOLUTION: Performed by: INTERNAL MEDICINE

## 2024-09-30 PROCEDURE — 84100 ASSAY OF PHOSPHORUS: CPT | Performed by: INTERNAL MEDICINE

## 2024-09-30 PROCEDURE — 1160F RVW MEDS BY RX/DR IN RCRD: CPT | Performed by: INTERNAL MEDICINE

## 2024-09-30 PROCEDURE — 36415 COLL VENOUS BLD VENIPUNCTURE: CPT

## 2024-09-30 RX ADMIN — DENOSUMAB 120 MG: 120 INJECTION SUBCUTANEOUS at 12:15

## 2024-09-30 NOTE — LETTER
September 30, 2024     Jazmine Chanel MD  [address] [address]    Patient: Martha Davey   YOB: 1960   Date of Visit: 9/30/2024     Dear Jazmine Chanel MD:       Thank you for referring Martha Davey to me for evaluation. Below are the relevant portions of my assessment and plan of care.    If you have questions, please do not hesitate to call me. I look forward to following aMrtha along with you.         Sincerely,        Ubaldo Hancock MD        CC: She Brunner, LISA Hancock, Ubaldo YODER Jr., MD  09/30/24 2120  Sign when Signing Visit  Chief Complaint  Previous Stage Ib ([id number]) ER/UT positive, HER-2/peter negative right breast cancer with subsequent metastatic disease identified 10/8/2017, history of right pulmonary embolism, cancer related pain, chemotherapy-induced diarrhea, chemotherapy-induced mucositis, chemotherapy-induced hand-foot syndrome    Subjective       History of Present Illness  The patient returns today in follow-up with laboratory studies, CT scans, bone scan to review continuing on oral Xeloda 1000 mg in the morning, 1500 mg in the evening for 7 days on followed by 7 days off as well as every 3-month Xgeva (due today) and Eliquis 5 mg twice daily.  The patient has been maintained on treatment with single agent Xeloda since 2/7/2018.  She does continue with chronic side effects from treatment including hand-foot syndrome and sclerodactyly.  She continues to use emollient cream frequently 4-5 times per day as well as topical triamcinolone intermittently (2 weeks on followed by 2 weeks off as instructed by dermatology).  She believes that her symptoms are stable, the erythema on the dorsal aspect of her hands has diminished.  She continues with dry, cracked skin on her palms and some chronic degree of sclerodactyly which does affect her dexterity but is overall manageable by her report.  She has much less effect on her plantar surface.  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  She has had some intermittent wounds on her feet from the braces and irritation.  She had a recent wound on the lateral aspect of her left foot which did require attention from wound care clinic with debridement a few weeks ago.  She reports that the area is now healing properly.  She is being referred to a new podiatrist.  She has also continued with frequent urinary tract infections and is now on prophylactic cephalexin daily.  She is being referred to a new urogynecologist.  She was prescribed topical Estrace to use as well.  She notes that her level of activity in terms of walking has decreased slightly recently with the foot wound but she is trying to get back to her previous level of walking for exercise.  She reports minimal difficulty with diarrhea from Xeloda, requires treatment with Imodium every few weeks.  Her appetite is normal, weight stable at 184 pounds.  Patient does continue follow-up with supportive oncology.  She is also continuing with grief counseling regarding the death of her  earlier this year.  She feels that she is coping reasonably well at this point.  Her sons are doing reasonably well, her middle son having some difficulty ongoing and is continuing with grief counseling through hospice as well.      Objective  Vital Signs:   /68   Pulse 95   Temp 98.7 °F (37.1 °C) (Oral)   Resp 16   Ht 154.9 cm (60.98\")   Wt 83.7 kg (184 lb 8 oz)   SpO2 97%   BMI 34.88 kg/m²     Physical Exam  Constitutional:       Appearance: She is well-developed.      Comments: Patient ambulates with the use of a cane   Eyes:      Conjunctiva/sclera: Conjunctivae normal.   Neck:      Thyroid: No thyromegaly.   Cardiovascular:      Rate and Rhythm: Normal rate and regular rhythm.      Heart sounds: No murmur heard.     No friction rub. No gallop.   Pulmonary:      Effort: No respiratory distress. "      Breath sounds: Normal breath sounds.   Abdominal:      General: Bowel sounds are normal. There is no distension.      Palpations: Abdomen is soft.      Tenderness: There is no abdominal tenderness.   Musculoskeletal:      Comments: Braces in place in the bilateral lower extremities   Lymphadenopathy:      Head:      Right side of head: No submandibular adenopathy.      Cervical: No cervical adenopathy.      Upper Body:      Right upper body: No supraclavicular adenopathy.      Left upper body: No supraclavicular adenopathy.   Skin:     General: Skin is warm and dry.      Findings: Rash present.      Comments: Erythema involving the palms has improved and is minimal.  There is minimal residual erythema in the dorsal aspect of the hands.  There is no significant erythema in the palmar surfaces however there is significant skin cracking, no current peeling.  There is sclerodactyly which is fairly stable from her last exam.   Neurological:      Mental Status: She is alert and oriented to person, place, and time.      Cranial Nerves: No cranial nerve deficit.      Motor: No abnormal muscle tone.      Deep Tendon Reflexes: Reflexes normal.   Psychiatric:         Behavior: Behavior normal.           Result Review: Reviewed CBC, CMP, magnesium, phosphorus from today.  Reviewed CT chest abdomen pelvis and bone scan from 9/23/2024.  Reviewed PCP records.  Reviewed wound care records.     Assessment and Plan     *Previous Stage Ib ([id number]) right breast cancer:  Diagnosed May 2010 with excisional biopsy for invasive ductal carcinoma, 1.3 cm, grade 2, ER 90%, AK 80%, HER-2/peter negative (1+ IHC).    Subsequent right mastectomy in July 2010 with no residual breast malignancy, 1/5 sentinel lymph node with micrometastasis (0.25 mm).    Treated in the [facility] system with adjuvant AC ×4 cycles in 2010 (no taxanes administered due to underlying Charcot-Saloni-Tooth with peripheral neuropathy).    Adjuvant Femara  (postmenopausal) initiated October 2010 with plan to treat ×10 years.    Genetic testing reportedly negative.    Developed osteopenia treated with Prolia beginning 2/27/13. Subsequently discontinued due to identification of metastatic disease.    *Recurrent/metastatic disease identified 10/8/17:  Disease involving thoracic spine with cord compression at T6, lumbosacral involvement, sternal and right sternoclavicular involvement.    Femara discontinued in 10/2017.    Radiation administered (in the [facility] system) to the thoracic spine beginning 10/19/17 treating T3-T9 to a dose of 24 gray in 6 fractions.  Evaluation with MRI 12/8/17 showing persistent T6 cord compression with persistent neurologic compromise requiring surgical treatment 12/11/17 with T6 laminectomy/corpectomy and T3-T9 fusion.  Pathology with metastatic carcinoma of breast origin, ER negative, OR negative, HER-2/peter negative (1+ IHC).  Additional staging evaluation 12/8/17 with no evidence of visceral metastatic disease, bone scan showing involvement of thoracic spine, sternum, left humerus, mid frontal bone.  No plane film correlate of left humerus lesion.  MRI lumbar spine with small intradural L3 metastasis.  CA 15-3 12/6/17- 17.  Palliative radiation therapy to L3 dural metastasis and left humerus initiated 1/15/18 (10 fractions), completed 1/26/18.  Hypercalcemia of malignancy with calcium in the 10-11 range.  Initiation of monthly Xgeva 1/23/18.  Baseline CT scan 1/30/18 with no evidence of visceral involvement.  Cluster of nodular opacities in the right lower lobe suspected to be infectious or related to bronchiolitis. Bone scan 1/30/18 showed postsurgical change in the thoracic spine, stable uptake in the frontal bone, no new areas of disease.  Initiation of palliative oral single agent Xeloda 2/7/18 2000 mg a.m., 1500 mg p.m. for 14/21 days.   Following 3 cycles xeloda, bone scan 4/4/18 showed no change from the prior study.  CT scan 4/4/18  showed a small pericardial effusion of unclear significance as well as a subcutaneous nodule in the right lateral chest wall.  Subsequent evaluation with echocardiogram 4/17/18 showed no evidence of pericardial effusion.  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  We discussed the possibility of surgical excision of the right subcutaneous chest wall lesion for more definitive diagnosis.  Reviewed previous CT images dating back to 12/8/17 and the lesion was present even at that time measuring around 1.7 cm although not commented on in the radiology report.  As this appears to be an indolent low-grade process unrelated to her breast cancer, recommendee foregoing surgical excision at this time and monitoring this area on future scans.  The patient agreed.    Following 6 cycles of Xeloda, CT 6/6/18  showed stable findings, no evidence of progressive disease.  There was a comment regarding subcutaneous abnormality in the anterior abdominal wall and this was related to Lovenox injection sites.  Bone scan 6/6/18 showed no interval change.   CT scan and bone scan 8/13/18 following 9 cycles of Xeloda showed stable findings with no evidence of significant visceral metastases.  Her bone lesions appear stable on bone scan.  The spindle cell neoplasm in her right chest wall actually decreased in size from 2 cm down to 1.6 cm.    The patient experienced some symptoms of diarrhea, anorexia, generalized weakness during cycle 9 Xeloda it was unclear whether this was related to a viral gastroenteritis or toxicity from treatment.  Symptoms recurred during cycle 10 and treatment was cut short by 2 days.  Symptoms attributed to Xeloda.  With cycle 11, dose and schedule altered to 1500 mg twice daily for 7 days on followed by 7 days off .  CT scan 9/9/2020 with no significant changes.  Bone  scan 9/9/2020 read as unchanged from prior studies however did note an area of slight activity in the medial left femur.  Contacted radiology and although this was not noted on prior reports appears to have been present.  Subsequent MRI left femur 9/21/2020 with cortical thickening and periosteal edema left iliac us muscle insertion to the medial left femur with no evidence of metastatic disease, favored to represent periosteal change secondary to insertional tendinitis.  Severe hand-foot symptoms causing sclerodactyly and limitation in finger movement prompted change in dosing in July 2021 with Xeloda decreased to 1000 mg a.m., 1500 mg p.m. for 7 days on followed by 7 days off.  Patient was experiencing severe hand-foot syndrome related to Xeloda having developed skin peeling, sclerodactyly with limited use of her hands.  On 3/31/2022, Xeloda was held to allow for recovery.  Patient resumed Xeloda on 4/18/2022.  Patient required hospitalization 5/29 - 6/1/2022 with presumed viral gastroenteritis that was exacerbated by Xeloda.  Xeloda held briefly, resumed on 6/6/2022.  Patient again with worsening sclerodactyly, Xeloda held for 2 weeks from 10/3 - 10/17/2022, resumed at prior dose with improvement in symptoms.  Scans on 3/3/2023 with CT chest abdomen pelvis and bone scan showing stable findings.  CT chest abdomen pelvis 7/3/2023 with questionable 1 mm change in size right lower lobe nodule.  Additional small pulmonary nodules stable.  Stable bony metastatic disease.  Bone scan on 7/7/2023 however showed slight progression focal involvement right ischium and superior pubic ramus (new ischial lesion superior pubic ramus compared to 10/24/2022 and more conspicuous compared to 3/3/2023).  Metastatic lesion right inferior pubic ramus and ischial tuberosity increased in size since October 2022 however stable from 3/3/2023.  MRI cervical spine 7/3/2023 with no metastatic involvement however identification of the lesion  at T2, recommended dedicated thoracic spine MRI to evaluate further.  Given the minimal extent and slow degree of progression, elected to continue oral Xeloda and evaluate further with MRI pelvis and thoracic spine.  Consideration of biopsy pelvic metastasis for repeat ER/[place]/HER2/peter testing.    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  CA 15-3 on 7/10/2023 was 12.2, uninformative.  MRI thoracic spine 7/27/2023 with 1 cm metastatic focus seen in L1  MRI pelvis 7/27/2023 with metastatic foci identified right superior pubic ramus, right ischial tuberosity, inferior pubic ramus, L5 body.  Chronic appearing posttraumatic and/or degenerative change in the posterior right ilium adjacent SI joint.  CT-guided biopsy right pubic ramus lesion on 8/31/2023.  Pathology showed no evidence of malignancy, showed markedly calcified and sclerotic mature lamellar bone.  Attempted decalcification but no evidence of malignancy.  CT chest abdomen pelvis 9/8/2023 and bone scan 9/11/2023 with stable findings.  CT chest abdomen pelvis 12/8/2023 with possible slight increase in right chest wall subcutaneous lesion (1.8 x 1.1 up to 1.8 x 1.4 cm) although may be related to altered positioning.  Nonpathologically enlarged right axillary and subpectoral lymph nodes slightly increased (patient notes she received RSV vaccine right arm just prior to scan).  Bone scan 12/11/2023 with stable findings.  CT chest abdomen pelvis 3/19/2024 with slight interval decrease in size of right axillary and bilateral subpectoral lymph nodes, right subcutaneous soft tissue nodule slightly smaller, stable pulmonary nodules, stable bone metastases.  Bone scan 3/19/2024 with stable findings.  CT chest abdomen pelvis 6/21/2024 showed left subpectoral lymph node to have increased slightly in size (0.9 x 0.6 up to 1.3 x 0.8 cm).  Subcutaneous nodule right chest wall decreased from 1.1 x 1.9 down to 1.1 x 1.4 cm.   Scan otherwise stable.  Bone scan 6/21/2024 with stable findings  CT chest abdomen pelvis 9/23/2024 with resolution of left subpectoral lymphadenopathy, additional findings stable.  Bone scan 9/23/2024 with stable findings.  The patient returns today continuing on treatment with Xeloda 1000 mg a.m., 1500 mg p.m. 7 days on followed by 7 days off.  She is continuing on Xgeva every 3 months, due today.  She has been on single agent Xeloda since 2/7/2018.  Her scans from 9/23/2024 showed stable findings, resolution of borderline left subpectoral lymphadenopathy.  She continues to tolerate treatment reasonably well.  Hand-foot syndrome does persist however symptoms have actually improved somewhat over time with resolution of erythema that had been extending onto the dorsal aspect of her hands, resolution of the erythema involving her palmar surface, no current skin peeling although there is significant skin scaling and cracking.  This does produce some degree of sclerodactyly which is fairly stable, does affect her dexterity to some extent although she feels that this is manageable.  We will plan to continue on with treatment without alteration in her dose or schedule.  She will return here in 6 weeks for NP visit.  I will see her in 14 weeks (2 weeks delayed due to the holidays) when she will be due for Xgeva again.  We discussed trying to lengthen out interval for subsequent follow-up scans to 4 to 5 months and we will discuss this when I see her in 14 weeks.  It is noted that the patient was started on Estrace cream by her PCP due to recurrent UTIs.  This should have little systemic absorption.  On previous biopsy, her disease was noted to be ER/ND negative, unclear whether this was a true negative result or was related to false negative result due to decalcification required for bone tissue processing.    *Right pulmonary embolism:  Diagnosed on CT angiogram 10/21/17 involving small right lower lobe pulmonary  artery.  Lower extremity Dopplers negative.  Bilateral lower extremity Dopplers negative again 12/5/17.  Received chronic Lovenox 1 mg/kg twice per day, transitioned to oral Eliquis in February 2019, continuing on Eliquis 5 mg twice daily.  Patient continues on Eliquis 5 mg twice daily    *Cancer related pain:  Previously receiving Duragesic 50 µg patch every 72 hours along with Dilaudid 4 mg as needed for breakthrough pain  The patient's pain improved over time and she was able to discontinue both Duragesic and Dilaudid in the interval.  Patient does take occasional Flexeril at bedtime due to back spasm/pain when she has been more active.  The patient does have some occasional aggravation of her chronic back pain and does use tramadol 50 mg every 8 hours as needed  Patient was receiving tramadol 50 mg every 8-12 hours as needed in addition to hydrocodone 5/325 every 4 hours as needed.  She was also taking OTC NSAIDs.  Patient developed nausea related to hydrocodone and was transitioned to Percocet 5/325 every 4 hours as needed, advised to stop taking NSAIDs with ongoing anticoagulation.  Patient notes pain in her shoulders that does wax and wane is unrelated to her malignancy.  She does continue on tramadol 50 mg every 8 hours and Percocet 5/325 every 4 hours as needed    *Chemotherapy-induced diarrhea with subsequent C. difficile colitis in the setting of previous ulcerative colitis and then identification of enteropathogenic E. coli:  Patient with reported history of ulcerative colitis, continues on mesalamine.  The patient developed initial diarrhea related to Xeloda at regular dosing.  Symptoms improved on reduced dose Xeloda  Flare of symptoms in October 2018 with apparent finding of C. difficile colitis by GI, treated with course of oral vancomycin with improvement in symptoms.  Patient notes minimal intermittent diarrhea/loose stools on Xeloda requiring occasional dosing of Imodium.    Patient required  hospitalization 5/29 - 6/1/2022 with presumed viral gastroenteritis that was exacerbated by Xeloda.  Xeloda held briefly, resumed on 6/6/2022.  Patient's  developed C. difficile colitis and patient was experiencing increase in diarrhea.  Stool studies performed 8/4/22 negative C. difficile however GI PCR was positive for enteropathogenic E. coli.  Given patient's symptoms and immunocompromise status it was elected to treat her with azithromycin 500 mg daily x3 days beginning 8/5/2022  Diarrhea resolved  Patient underwent colonoscopy on 2/28/2023 with findings of diverticulosis  Patient notes fairly infrequent diarrhea.  Every few weeks and requires minimal dosing of Imodium.    *Traumatic left tibia/fibular fracture:  Status post ORIF 12/6/17  Specimen was sent for pathologic review, negative for evidence of malignancy    *Hypercalcemia:  Suspect hypercalcemia of malignancy, calcium in  10-11 range previously.  Calcium normalized following initiation of monthly Xgeva on 1/23/18.    *Chemotherapy-induced mucositis:  Patient has not experienced any recent difficulty with mucositis.    *Recurrent UTI, bladder wall thickening on CT:  Patient had an enterococcal UTI on 3/2/18 sensitive to nitrofurantoin and received treatment, unclear how long.  Recurrent UTI 3/20/18 with urine culture growing Klebsiella, initially treated with nitrofurantoin, transitioned to Levaquin.  CT 4/4/18 with diffuse bladder wall thickening with increased nodular thickening at the left base.  Referral to urogynecology Dr. May Johnson.  She was placed on a prophylactic dose of trimethoprim 100 mg daily, bladder wall thickening felt to be related to recent recurrent urinary tract infections.  Patient with urinary symptoms, treated with course of Macrobid at the end of December 2018, urine culture however was negative 12/31/18.  Patient was found to have Klebsiella UTI 7/29/2019 which was successfully treated with Macrobid with complete  resolution of symptoms.  CT 8/10/2021 with diffuse bladder wall thickening new from 5/17/2021 (however seen on multiple prior scans).    UTI on 10/18/2021 with E. coli greater than 100,000 colonies that was pansensitive, treated with Macrobid x7 days  With ongoing/recurrent UTIs patient was seen by urogynecology and initiated suppressive therapy with trimethoprim 100 mg daily in December 2021.  She has not experienced any further urinary tract infections.  CT abdomen pelvis 3/28/2022 does show diffuse thickening of urinary bladder as has been seen on prior studies indicative of cystitis.  Patient notes that she did stop taking trimethoprim on a daily basis.    Patient with urine culture on 5/5/2023 that grew E. coli and she received antibiotic treatment from urogynecology.    Urine culture on 8/23/2023 with greater than 100,000 colonies of E. coli resistant to ampicillin and Bactrim.  Received 5-day course of Cipro with resolution of symptoms.  Patient did have UTI with E. coli on culture 10/2/2023, received a course of Augmentin.  Patient with ongoing intermittent urinary tract infections.  She has been placed on prophylactic dose of Keflex daily per her PCP and was referred to a new urogynecologist.  She was also prescribed Estrace cream (see above discussion).    *Charcot-Saloni-Tooth with mobility difficulties:  The patient underwent an intensive course of rehabilitation at [facility].  She graduated from her outpatient course November 2018.  Overall the patient has improved dramatically in terms of mobility,   Patient developed significant callus formation lateral aspect of the left plantar surface.    Patient has developed skin erosion and an ulceration on the lateral aspect of her left foot.  She has been seen recently by wound care and is being referred to a new podiatrist.  She is hopefully obtaining a new brace.    *Depression:  The patient is continuing follow-up in the supportive oncology clinic and is  currently continuing on Cymbalta 30 mg twice daily as well as gabapentin 900 mg nightly, Ativan 0.5 mg nightly as needed, Provigil 100 mg daily.  Patient does continue to attend support groups at [facility].  The patient does continue routine follow-up in supportive oncology clinic.    Patient does have multiple stressors with her 's malignancy and chemotherapy as well as her autistic son who is living with them at home.  Patient reports that she continues to deal with grief related to her 's death this past year.  She continues to see a grief counselor through hospice which has helped significantly.  She continues routine follow-up with supportive oncology as well.    *Hand-foot syndrome secondary to Xeloda:  Patient continues with frequent application of emollient cream to the hands and feet  Symptoms increased significantly requiring a 1 week delay in cycle 18 Xeloda as noted above.  Symptoms did improve and she continued on the same dose.    Patient was referred to dermatology and has been continuing on triamcinolone 0.1% cream used for 1 week on followed by 1 week off which led to some further improvement.   Progressive palmar erythema with development of sclerodactyly and effect on dexterity.  Addition of urea-based cream 3 times daily.  Patient with continued difficulty regarding erythema of her hands that extended onto the dorsal aspect and was causing contractures/sclerodactyly in her fingers affecting her dexterity.  In July 2021, held Xeloda for an additional week and then reduced dose from 1500 mg twice daily down to 1000 mg a.m. and 1500 mg p.m. and continued on a 7-day on followed by 7-day off schedule.  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  On  3/31/2022, Xeloda was held to allow for recovery.  Patient resumed Xeloda on 4/18/2022.  Patient developed worsening sclerodactyly affecting dexterity that required Xeloda to again be briefly held for 2 weeks from 10/3 - 10/17/2022.  Treatment resumed at prior dose after improvement in symptoms.  Patient continues to use emollient cream frequently (currently 5 times daily) and topical triamcinolone 0.1% twice daily intermittently (2 weeks on followed by 2 weeks off as instructed by dermatology).  Patient with overall improvement in involvement of the hands, no current erythema but continued scaling and cracking of the skin on the palmar surface.  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  She feels that symptoms are tolerable.  Symptoms involving the feet are significantly less than the hands.  Unclear to what extent Xeloda is also contributing to patient's left foot wound although apparently that is healing well currently.    *Evidence of steatosis on scans with mild intermittent elevated liver function studies:  Liver function studies increased 8/20/19 with ALT 98, AST 70, normal total bilirubin.  Negative viral hepatitis A, B, and C panel 8/23/18  Likely related to hepatic steatosis.   Subsequent improvement in LFTs  LFTs today are normal    *Chemotherapy induced leukopenia:  WBC today is normal at 5.27    *GERD, question of celiac disease:  Patient with significant history of reflux  Patient currently receiving Protonix 40 mg daily daily and Carafate 1 g 4 times daily as needed  Patient required hospitalization 5/29 - 6/1/2022 with presumed viral gastroenteritis that was exacerbated by Xeloda.    EGD performed 5/31/2022 during hospitalization with biopsy that showed focal blunting of villi and atrophy with slight increase in intraepithelial lymphocytes.  Changes were minimal but recommended correlation with serology to exclude celiac disease.  Celiac  serology 8/8/2022 negative  Patient previously developed worsening reflux symptoms, temporarily increase Protonix to 40 mg twice daily and we did add Carafate 1 g 4 times daily.    She is taking Protonix 40 mg once daily, is not taking Carafate.    *Insomnia:  Patient with prior paradoxical reaction to Benadryl  Improved previously on temazepam 15 mg nightly as needed.   Patient noted subsequently that temazepam was having no effect.   Patient continuing on gabapentin 900 mg nightly    *Health maintenance:  Patient notes that she has a history of colon polyps as well as ulcerative colitis and was due for a follow-up colonoscopy on 9/12/2019.  We did discuss there is no necessity to pursue colonoscopy in the setting of her metastatic breast cancer.    The patient did undergo colonoscopy on 2/7/2020 with findings of muscular hypertrophy and diverticulosis.   Patient did wish to proceed with further colonoscopic evaluation, performed on 12/20/2022 with poor prep.  Repeat 2/28/2023 with diverticulosis.    *Bilateral shoulder pain:  Chronic difficulty with right shoulder although she has not complained of this in prior visits to our office.  She reported difficulty with abduction.    The patient did undergo MRI of her right shoulder on 11/21/2019 at an outside facility showing multiple abnormalities including supraspinatus tendinosis, labral tear but no evidence of metastatic disease.  Patient developed pain in the left shoulder, was seen by orthopedics and underwent steroid injection with some improvement.  Right shoulder stable.  Patient did undergo physical therapy with some improvement.  She continues to use tramadol 50 mg every 8-12 hours as needed.  Note that current CT 10/20/2022 made notation of left shoulder probable bursitis.    Patient continues with bilateral shoulder pain, left greater than right which does wax and wane.      *Ocular changes in part related to Xeloda:  Patient experienced a mild degree of  blurred vision as well as burning and pruritus.  She was seen by her ophthalmologist and was placed on xiidra ophthalmic drops which have helped.  Likely both issues are to some extent related to Xeloda.  Symptoms did worsen and patient was seen by ophthalmologist at [facility].  She is now using an ocular lubricant at night in addition to artificial tears which have helped.  Symptoms currently stable to improved    *Elevated glucose:  It is noted the patient's glucose at the last few visits has been in the high 100 range, postprandial.  She has had a hemoglobin A1c that has been in the high 5-6 range in the past  Hemoglobin A1c was last checked on 9/9/2024 at 5.9    *Possible loosening of pedicle screw at T3:  CT cervical spine 5/17/2021 showed loosening of the left pedicle screw at T3.  Patient referred to orthopedics and was seen by Dr. Nayak who felt that there were no concerning findings on the scan    *Iron deficiency anemia  Labs on 5/2/2022 with hemoglobin 10.8.  Additional labs with iron 48, ferritin 21.2, iron saturation 11%, TIBC 4 and 32, folate greater than 20, B12 greater than 2000.    Attempted treatment with oral iron daily however patient was intolerant  Patient subsequently received Venofer 300 mg weekly x4 beginning 6/6/2022 and completed on 6/27/2022  Subsequent iron studies on 7/11/2022 showed improvement with iron 62, ferritin 345, iron saturation 18%, TIBC 336.  Hemoglobin had improved up to 12.7 at that time.  Repeat iron studies on 10/3/2022 showed iron replete status with hemoglobin 13.7, iron 121, ferritin 208.7, iron saturation 31%, TIBC 392.  Hemoglobin currently normal at 12.7     Plan:  Continue palliative single agent Xeloda 1000 mg a.m., 1500 mg in the p.m. 7 days on followed by 7 days off (patient has been on single agent Xeloda since 2/7/2018)  Proceed with every 3-month Xgeva today  Continue Eliquis 5 mg twice daily  The patient will continue frequent use of  emollient cream currently 5 times daily and continue periodic triamcinolone cream 0.1% twice daily (provided by dermatology) 2 weeks on followed by 2 weeks off as prescribed  Continue Protonix 40 mg daily  Continue ocular lubricating gel nightly per ophthalmology  Continue Provigil 100 mg daily and Cymbalta 30 mg twice daily.  Patient continues routine follow-up with supportive oncology   Patient will continue gabapentin 900 mg at night.   Patient continues on tramadol 50 mg every 8 hours as needed for pain  Continue Percocet 5/325 every 4 hours as needed for pain  Patient continues with grief counseling with hospice in regards to recent death of her   In 6 weeks NP visit with CBC, CMP  MD visit in 14 weeks (2 weeks delayed due to holidays) with CBC, CMP, magnesium, phosphorus.  Patient will be due for Xgeva.  We will plan to perform CT scans approximately 4 months from current studies and we will order these at return visit.     Patient continuing on high risk medication requiring intensive monitoring.       I did spend 40 minutes in total time caring for the patient today, time spent in review of records, face-to-face consultation, coordination of care, placement of orders, completion of documentation.

## 2024-10-01 ENCOUNTER — SPECIALTY PHARMACY (OUTPATIENT)
Dept: PHARMACY | Facility: HOSPITAL | Age: 64
End: 2024-10-01
Payer: MEDICARE

## 2024-10-01 NOTE — PROGRESS NOTES
Specialty Pharmacy Note: Xeloda (capecitabine)    Martha Davey is a 63 y.o. female with breast cancer was seen 9/30/24 by Dr. Hancock. Per provider dictation, no changes to oral oncology regimen Xeloda (capecitabine).  Labs Review: The CMP and CBC from 9/30/24 have been reviewed. No dose adjustments are needed for the oral specialty medication(s) based on the labs.    Specialty pharmacy will continue to follow patient.    Gage Gonsales, Es, [facility]  Clinical Oncology Pharmacist    10/1/2024  08:56 EDT

## 2024-10-08 ENCOUNTER — HOSPITAL ENCOUNTER (OUTPATIENT)
Dept: GENERAL RADIOLOGY | Facility: HOSPITAL | Age: 64
Discharge: HOME | End: 2024-10-08
Admitting: STUDENT IN AN ORGANIZED HEALTH CARE EDUCATION/TRAINING PROGRAM
Payer: MEDICARE

## 2024-10-08 DIAGNOSIS — M79.672 PAIN IN LEFT FOOT: ICD-10-CM

## 2024-10-08 DIAGNOSIS — M79.672 PAIN IN LEFT FOOT: Primary | ICD-10-CM

## 2024-10-08 PROCEDURE — 73630 X-RAY EXAM OF FOOT: CPT

## 2024-10-10 ENCOUNTER — TELEMEDICINE (OUTPATIENT)
Dept: PSYCHIATRY | Facility: HOSPITAL | Age: 64
End: 2024-10-10
Payer: MEDICARE

## 2024-10-10 DIAGNOSIS — F43.21 GRIEF: ICD-10-CM

## 2024-10-10 DIAGNOSIS — F41.1 GENERALIZED ANXIETY DISORDER: Primary | ICD-10-CM

## 2024-10-10 DIAGNOSIS — R53.0 NEOPLASTIC MALIGNANT RELATED FATIGUE: ICD-10-CM

## 2024-10-10 DIAGNOSIS — F33.1 MAJOR DEPRESSIVE DISORDER, RECURRENT EPISODE, MODERATE: ICD-10-CM

## 2024-10-10 RX ORDER — MODAFINIL 200 MG/1
200 TABLET ORAL DAILY
Qty: 30 TABLET | Refills: 2 | Status: SHIPPED | OUTPATIENT
Start: 2024-10-10

## 2024-10-10 RX ORDER — LORAZEPAM 0.5 MG/1
0.25 TABLET ORAL NIGHTLY
Qty: 15 TABLET | Refills: 0 | Status: SHIPPED | OUTPATIENT
Start: 2024-10-10 | End: 2025-10-10

## 2024-10-10 NOTE — PROGRESS NOTES
Behavioral Oncology Services  Video visit conducted via [employer] Video Visit  You have chosen to receive care through a telehealth visit.  Do you consent to use a video/audio connection for your medical care today? YES  Telehealth provided in patient's home.  Location of Provider: [place], [place]     Subjective  Patient ID: Martha Davey is a 63 y.o. female is seen via telehealth in the Behavioral Oncology Clinic.    CC: Anxiety, grief    HPI/ Interval History:   Pt is seen in follow up regarding ongoing impact of grief, loss, and distress in survivorship of metastatic breast cancer. Shares stability on recent scans, while noting tremendous anxiety leading up to this. Reports continuing Xeloda and Xgeva, appreciating appropriate tolerability of these. Unfortunately, continues to have additional co morbidities, currently mourning pain in feet with potential need for surgery. Mourns interplay with various medical complexities. Fortunately, has hand controls on vehicle and remains able to drive easily.    Continues to take lorazepam, appreciating ease of sleep initiation appx midnight with good sleep, waking appx noon. If doesn't take lorazepam, sleep is difficult to initiate and fragmented, waking by 9:30-10 AM. States energy is improved when she doesn't take the medicine, although becomes tired earlier in the day. Has never taken a half pill but is agreeable to do doing this.    Continues to endorse interpersonal challenges, exacerbated by grief surrounding recent loss of . Reports ability to engage in required activities. Interacts often with loved ones and support network through dinners, etc on the weekends. Mood sx essentially stable with ongoing adherence to modafinil, duloxetine, and gabapentin.    Exam: As above    Recent Labs Reviewed:  Lab on 09/30/2024   Component Date Value    Glucose 09/30/2024 134 (H)     BUN 09/30/2024 30 (H)     Creatinine 09/30/2024 1.17 (H)     Sodium 09/30/2024 141      Potassium 09/30/2024 4.5     Chloride 09/30/2024 101     CO2 09/30/2024 31.2 (H)     Calcium 09/30/2024 9.6     Total Protein 09/30/2024 6.7     Albumin 09/30/2024 3.9     ALT (SGPT) 09/30/2024 17     AST (SGOT) 09/30/2024 25     Alkaline Phosphatase 09/30/2024 70     Total Bilirubin 09/30/2024 0.4     Globulin 09/30/2024 2.8     A/G Ratio 09/30/2024 1.4     BUN/Creatinine Ratio 09/30/2024 25.6 (H)     Anion Gap 09/30/2024 8.8     eGFR 09/30/2024 52.5 (L)     Magnesium 09/30/2024 2.1     Phosphorus 09/30/2024 4.2     WBC 09/30/2024 5.27     RBC 09/30/2024 3.88     Hemoglobin 09/30/2024 12.7     Hematocrit 09/30/2024 39.5     MCV 09/30/2024 101.8 (H)     MCH 09/30/2024 32.7     MCHC 09/30/2024 32.2     RDW 09/30/2024 17.3 (H)     RDW-SD 09/30/2024 65.3 (H)     MPV 09/30/2024 10.4     Platelets 09/30/2024 279     Neutrophil % 09/30/2024 55.0     Lymphocyte % 09/30/2024 27.7     Monocyte % 09/30/2024 10.8     Eosinophil % 09/30/2024 4.6     Basophil % 09/30/2024 1.7 (H)     Immature Grans % 09/30/2024 0.2     Neutrophils, Absolute 09/30/2024 2.90     Lymphocytes, Absolute 09/30/2024 1.46     Monocytes, Absolute 09/30/2024 0.57     Eosinophils, Absolute 09/30/2024 0.24     Basophils, Absolute 09/30/2024 0.09     Immature Grans, Absolute 09/30/2024 0.01     nRBC 09/30/2024 0.0    Orders Only on 08/29/2024   Component Date Value    WBC 09/09/2024 7.97     RBC 09/09/2024 3.99     Hemoglobin 09/09/2024 13.3     Hematocrit 09/09/2024 38.8     MCV 09/09/2024 97.2 (H)     MCH 09/09/2024 33.3 (H)     MCHC 09/09/2024 34.3     RDW 09/09/2024 17.0 (H)     Platelets 09/09/2024 281     Neutrophil Rel % 09/09/2024 70.3     Lymphocyte Rel % 09/09/2024 19.6     Monocyte Rel % 09/09/2024 6.4     Eosinophil Rel % 09/09/2024 2.8     Basophil Rel % 09/09/2024 0.6     Neutrophils Absolute 09/09/2024 5.61     Lymphocytes Absolute 09/09/2024 1.56     Monocytes Absolute 09/09/2024 0.51     Eosinophils Absolute 09/09/2024 0.22     Basophils  Absolute 09/09/2024 0.05     Immature Granulocyte Rel* 09/09/2024 0.3     Immature Grans Absolute 09/09/2024 0.02     nRBC 09/09/2024 0.0     Glucose 09/09/2024 110 (H)     BUN 09/09/2024 23     Creatinine 09/09/2024 0.98     EGFR Result 09/09/2024 65.0     BUN/Creatinine Ratio 09/09/2024 23.5     Sodium 09/09/2024 141     Potassium 09/09/2024 4.6     Chloride 09/09/2024 101     Total CO2 09/09/2024 32.2 (H)     Calcium 09/09/2024 9.4     Total Protein 09/09/2024 6.5     Albumin 09/09/2024 4.2     Globulin 09/09/2024 2.3     A/G Ratio 09/09/2024 1.8     Total Bilirubin 09/09/2024 0.4     Alkaline Phosphatase 09/09/2024 71     AST (SGOT) 09/09/2024 22     ALT (SGPT) 09/09/2024 16     Total Cholesterol 09/09/2024 171     Triglycerides 09/09/2024 103     HDL Cholesterol 09/09/2024 83 (H)     VLDL Cholesterol Feng 09/09/2024 18     LDL Chol Calc (NIH) 09/09/2024 70     TSH 09/09/2024 2.700     Hemoglobin A1C 09/09/2024 5.90 (H)     Specific [place], UA 09/09/2024 1.018     pH, UA 09/09/2024 6.0     Color, UA 09/09/2024 Yellow     Appearance, UA 09/09/2024 Turbid (A)     Leukocytes, UA 09/09/2024 3+ (A)     Protein 09/09/2024 2+ (A)     Glucose, UA 09/09/2024 Negative     Ketones 09/09/2024 Negative     Blood, UA 09/09/2024 3+ (A)     Bilirubin, UA 09/09/2024 Negative     Urobilinogen, UA 09/09/2024 0.2     Nitrite, UA 09/09/2024 Negative     Microscopic Examination 09/09/2024 See below:     Microscopic Examination 09/09/2024 See below:     Urinalysis Reflex 09/09/2024 Comment     WBC, UA 09/09/2024 >30 (A)     RBC, UA 09/09/2024 >30 (A)     Epithelial Cells (non re* 09/09/2024 >10 (A)     Casts 09/09/2024 None seen     Bacteria, UA 09/09/2024 Many (A)     Urine Culture 09/09/2024 Final report (A)     Result 1 09/09/2024 Escherichia coli (A)     Susceptibility Testing 09/09/2024 Comment    Office Visit on 08/16/2024   Component Date Value    Urine Culture 08/16/2024 >100,000 CFU/mL Escherichia coli (A)     Color, UA  08/16/2024 Yellow     Appearance, UA 08/16/2024 Cloudy (A)     pH, UA 08/16/2024 6.0     Specific Gravity, UA 08/16/2024 1.015     Glucose, UA 08/16/2024 Negative     Ketones, UA 08/16/2024 Negative     Bilirubin, UA 08/16/2024 Negative     Blood, UA 08/16/2024 Large (3+) (A)     Protein, UA 08/16/2024 100 mg/dL (2+) (A)     Leuk Esterase, UA 08/16/2024 Large (3+) (A)     Nitrite, UA 08/16/2024 Positive (A)     Urobilinogen, UA 08/16/2024 0.2 E.U./dL     Extra Tube 08/16/2024 Hold for add-ons.     RBC, UA 08/16/2024 Too Numerous to Count (A)     WBC, UA 08/16/2024 Too Numerous to Count (A)     Bacteria, UA 08/16/2024 2+ (A)     Squamous Epithelial Cell* 08/16/2024 Unable to determine due to loaded field (A)     Hyaline Casts, UA 08/16/2024 Unable to determine due to loaded field     Methodology 08/16/2024 Manual Light Microscopy    Lab on 08/16/2024   Component Date Value    Glucose 08/16/2024 102 (H)     BUN 08/16/2024 24 (H)     Creatinine 08/16/2024 1.02 (H)     Sodium 08/16/2024 140     Potassium 08/16/2024 3.9     Chloride 08/16/2024 100     CO2 08/16/2024 29.9 (H)     Calcium 08/16/2024 9.5     Total Protein 08/16/2024 7.0     Albumin 08/16/2024 3.9     ALT (SGPT) 08/16/2024 14     AST (SGOT) 08/16/2024 27     Alkaline Phosphatase 08/16/2024 81     Total Bilirubin 08/16/2024 0.4     Globulin 08/16/2024 3.1     A/G Ratio 08/16/2024 1.3     BUN/Creatinine Ratio 08/16/2024 23.5     Anion Gap 08/16/2024 10.1     eGFR 08/16/2024 61.9     WBC 08/16/2024 9.28     RBC 08/16/2024 4.03     Hemoglobin 08/16/2024 13.0     Hematocrit 08/16/2024 40.2     MCV 08/16/2024 99.8 (H)     MCH 08/16/2024 32.3     MCHC 08/16/2024 32.3     RDW 08/16/2024 17.2 (H)     RDW-SD 08/16/2024 64.3 (H)     MPV 08/16/2024 10.2     Platelets 08/16/2024 261     Neutrophil % 08/16/2024 73.4     Lymphocyte % 08/16/2024 12.6 (L)     Monocyte % 08/16/2024 9.7     Eosinophil % 08/16/2024 2.7     Basophil % 08/16/2024 0.3     Immature Grans %  08/16/2024 1.3 (H)     Neutrophils, Absolute 08/16/2024 6.81     Lymphocytes, Absolute 08/16/2024 1.17     Monocytes, Absolute 08/16/2024 0.90     Eosinophils, Absolute 08/16/2024 0.25     Basophils, Absolute 08/16/2024 0.03     Immature Grans, Absolute 08/16/2024 0.12 (H)     nRBC 08/16/2024 0.0    Labs reviewed    Current Psychotropic Medications:  Modafinil 200 mg q AM  Lorazepam 0.5 mg q hs PRN insomnia  Duloxetine 60 mg daily  Gabapentin 300 mg tid    Plan of Care/ Medical Decision Making  Continue medications as written, with encouragement to reduce lorazepam to 1/2 tab q hs to assist with sleep while minimizing daytime fatigue.  Discussed potential PT, ongoing communication with PCP and medical team regarding current debilitation.  Encouraged use of wheelchair as needed, supporting behavioral activation as able.  Encouraged ongoing connection to grief therapy.  FU scheduled in 6 weeks.    Diagnoses and all orders for this visit:    1. Generalized anxiety disorder (Primary)    2. Major depressive disorder, recurrent episode, moderate    3. Grief    I spent 36 minutes caring for Martah on this date of service. This time includes time spent by me in the following activities: preparing for the visit, reviewing tests, obtaining and/or reviewing a separately obtained history, performing a medically appropriate examination and/or evaluation, counseling and educating the patient/family/caregiver, ordering medications, tests, or procedures, and documenting information in the medical record.

## 2024-10-17 DIAGNOSIS — G89.3 CANCER ASSOCIATED PAIN: ICD-10-CM

## 2024-10-17 RX ORDER — TRAMADOL HYDROCHLORIDE 50 MG/1
50 TABLET ORAL EVERY 8 HOURS PRN
Qty: 90 TABLET | Refills: 0 | Status: SHIPPED | OUTPATIENT
Start: 2024-10-17

## 2024-10-21 ENCOUNTER — TELEMEDICINE (OUTPATIENT)
Dept: PSYCHIATRY | Facility: CLINIC | Age: 64
End: 2024-10-21
Payer: MEDICARE

## 2024-10-21 DIAGNOSIS — F41.1 GENERALIZED ANXIETY DISORDER: ICD-10-CM

## 2024-10-21 DIAGNOSIS — F33.1 MAJOR DEPRESSIVE DISORDER, RECURRENT EPISODE, MODERATE: Primary | ICD-10-CM

## 2024-10-21 PROCEDURE — 90832 PSYTX W PT 30 MINUTES: CPT | Performed by: COUNSELOR

## 2024-10-21 NOTE — PROGRESS NOTES
Date: October 21, 2024  Time In: 1200  Time Out: 1232  This provider is located at home address for [facility] (through [facility] [address], [place] [address] using a secure [employer] Video Visit through [employer]. Patient is being seen remotely via telehealth at home address in [place] and stated they are in a secure environment for this session. The patient's condition being diagnosed/treated is appropriate for telemedicine. The provider identified herself as well as her credentials. The patient, and/or patients guardian, consent to be seen remotely, and when consent is given they understand that the consent allows for patient identifiable information to be sent to a third party as needed. They may refuse to be seen remotely at any time. The electronic data is encrypted and password protected, and the patient and/or guardian has been advised of the potential risks to privacy not withstanding such measures.     You have chosen to receive care through a telehealth visit.  Do you consent to use a video/audio connection for your medical care today? Yes    PROGRESS NOTE  Data:  Martha Davey is a 63 y.o. female who presents today for follow up    Chief Complaint: defeated, depression    History of Present Illness: Pt shares feeling very overwhelmed and restless at times. Pt discussed health concerns and upcoming appointment in November. Pt reports she is trying to have hope and be hopeful but is feeling rather defeated due to one negative event occurring after another. Pt reports empathy towards friend who needs emotional support due to her 's roberts with cancer. Pt shares that she at times feels like a burden to others; specifically her sons and does not want to lose any of her independence. Pt worries that she will have some remorse if her mother passes away due to their relationship dynamic at this time.       Clinical  Maneuvering/Intervention:    (Scales based on 0 - 10 with 10 being the worst)  Depression: 8 Anxiety: 8       Assisted patient in processing above session content; acknowledged and normalized patient’s thoughts, feelings, and concerns.  Rationalized patient thought process regarding recent stressors and life events. Discussed triggers associated with patient's emotions. Also discussed coping skills for patient to implement. Discussed coping methods, being an empathetic listener without adopting additional stressors, and assisted with perspective involving mother.     Reviewed treatment goals and progress regarding identified goals. Progress remains on going at this time. Discussed expectations for next session.     Allowed patient to freely discuss issues without interruption or judgment. Provided safe, confidential environment to facilitate the development of positive therapeutic relationship and encourage open, honest communication. Assisted patient in identifying risk factors which would indicate the need for higher level of care including thoughts to harm self or others and/or self-harming behavior and encouraged patient to contact this office, call 911, or present to the nearest emergency room should any of these events occur. Discussed crisis intervention services and means to access. Patient adamantly and convincingly denies current suicidal or homicidal ideation or perceptual disturbance.    Assessment:   Assessment   Patient appears to maintain relative stability as compared to their baseline.  However, patient continues to struggle with depression and anxiety which continues to cause impairment in important areas of functioning.  A result, they can be reasonably expected to continue to benefit from treatment and would likely be at increased risk for decompensation otherwise.    Mental Status Exam:   Hygiene:   good  Cooperation:  Cooperative  Eye Contact:  Good  Psychomotor Behavior:  Appropriate  Affect:   Appropriate  Mood: depressed  Speech:  Normal  Thought Process:  Linear  Thought Content:  Mood congruent  Suicidal:  None  Homicidal:  None  Hallucinations:  None  Delusion:  None  Memory:  Intact  Orientation:  Person, Place, Time and Situation  Reliability:  fair  Insight:  Fair  Judgement:  Fair  Impulse Control:  Fair  Physical/Medical Issues:  No        Patient's Support Network Includes:  children    Functional Status: Mild impairment     Progress toward goal: Not at goal    Prognosis: Fair with Ongoing Treatment            Plan:    Patient will continue in individual outpatient therapy with focus on improved functioning and coping skills, maintaining stability, and avoiding decompensation and the need for higher level of care.    Patient will adhere to medication regimen as prescribed and report any side effects. Patient will contact this office, call 911 or present to the nearest emergency room should suicidal or homicidal ideations occur. Provide Cognitive Behavioral Therapy and Solution Focused Therapy to improve functioning, maintain stability, and avoid decompensation and the need for higher level of care.     Return in about 4 weeks, or earlier if symptoms worsen or fail to improve.           VISIT DIAGNOSIS:     ICD-10-CM ICD-9-CM   1. Major depressive disorder, recurrent episode, moderate  F33.1 296.32   2. Generalized anxiety disorder  F41.1 300.02        Diagnoses and all orders for this visit:    1. Major depressive disorder, recurrent episode, moderate (Primary)    2. Generalized anxiety disorder           [facility] No Show Policy:  We understand unexpected circumstances arise; however, anytime you miss your appointment we are unable to provide you appropriate care.  In addition, each appointment missed could have been used to provide care for others.  We ask that you call at least 24 hours in advance to cancel or reschedule an appointment.  We would like to take this  opportunity to remind you of our policy stating patients who miss THREE or more appointments without cancelling or rescheduling 24 hours in advance of the appointment may be subject to cancellation of any further visits with our clinic and recommendation to seek in-person services/visits.    Please call [phone] to reschedule your appointment. If there are reasons that make it difficult for you to keep the appointments, please call and let us know how we can help.  Please understand that medication prescribing will not continue without seeing your provider.      [facility]'s No Show Policy reviewed with patient at today's visit. Patient verbalized understanding of this policy. Discussed with patient that in the event that there are three or more no show visits, it will be recommended that they pursue in-person services/visits as noncompliance with telehealth visits indicates that patient is not an appropriate candidate for telemedicine and would likely be more appropriate for in-person services/visits. Patient verbalizes understanding and is agreeable to this.        This document has been electronically signed by Sophia Vizcarra LCSW.  October 21, 2024 14:28 EDT      Part of this note may be an electronic transcription/translation of spoken language to printed text using the Dragon Dictation System.

## 2024-10-21 NOTE — PLAN OF CARE
Problem: Anxiety  Goal: Patient will develop and implement behavioral and cognitive strategies to reduce anxiety and irrational fears.  Outcome: Progressing     Problem: Depression  Goal: Patient will demonstrate the ability to initiate new constructive life skills outside of sessions on a consistent basis.  Outcome: Progressing

## 2024-10-21 NOTE — TREATMENT PLAN
Multi-Disciplinary Problems (from Behavioral Health Treatment Plan)      Active Problems       Problem: Anxiety  Start Date: 03/02/22      Problem Details: The patient self-scales this problem as a 7 with 10 being the worst.          Goal Priority Start Date Expected End Date End Date    Patient will develop and implement behavioral and cognitive strategies to reduce anxiety and irrational fears. -- 03/02/22 04/21/25 --    Goal Details: Progress toward goal:  The patient self-scales their progress related to this goal as a 6 with 10 being the worst.          Goal Intervention Frequency Start Date End Date    Help patient explore past emotional issues in relation to present anxiety. Q4 Weeks 03/02/22 --    Intervention Details: Duration of treatment until remission of symptoms.          Goal Intervention Frequency Start Date End Date    Help patient develop an awareness of their cognitive and physical responses to anxiety. Q4 Weeks 03/02/22 --    Intervention Details: Duration of treatment until remission of symptoms.                  Problem: Depression  Start Date: 03/02/22      Problem Details: The patient self-scales this problem as a 7 with 10 being the worst.          Goal Priority Start Date Expected End Date End Date    Patient will demonstrate the ability to initiate new constructive life skills outside of sessions on a consistent basis. -- 03/02/22 04/21/25 --    Goal Details: Progress toward goal:  The patient self-scales their progress related to this goal as a 7 with 10 being the worst.          Goal Intervention Frequency Start Date End Date    Assist patient in setting attainable activities of daily living goals. PRN 03/02/22 --      Goal Intervention Frequency Start Date End Date    Provide education about depression PRN 03/02/22 --    Intervention Details: Duration of treatment until remission of symptoms.          Goal Intervention Frequency Start Date End Date    Assist patient in developing  healthy coping strategies. Q4 Weeks 03/02/22 --    Intervention Details: Duration of treatment until remission of symptoms.                                 I have discussed and reviewed this treatment plan with the patient.

## 2024-11-08 RX ORDER — MESALAMINE 1.2 G/1
1.2 TABLET, DELAYED RELEASE ORAL EVERY 12 HOURS SCHEDULED
Qty: 180 TABLET | Refills: 3 | Status: SHIPPED | OUTPATIENT
Start: 2024-11-08

## 2024-11-11 ENCOUNTER — LAB (OUTPATIENT)
Dept: OTHER | Facility: HOSPITAL | Age: 64
End: 2024-11-11
Payer: MEDICARE

## 2024-11-11 ENCOUNTER — OFFICE VISIT (OUTPATIENT)
Dept: ONCOLOGY | Facility: CLINIC | Age: 64
End: 2024-11-11
Payer: MEDICARE

## 2024-11-11 VITALS
TEMPERATURE: 98.7 F | BODY MASS INDEX: 34.23 KG/M2 | WEIGHT: 181.3 LBS | DIASTOLIC BLOOD PRESSURE: 83 MMHG | HEART RATE: 95 BPM | RESPIRATION RATE: 16 BRPM | HEIGHT: 61 IN | OXYGEN SATURATION: 96 % | SYSTOLIC BLOOD PRESSURE: 156 MMHG

## 2024-11-11 DIAGNOSIS — C50.919 PRIMARY MALIGNANT NEOPLASM OF BREAST WITH METASTASIS: ICD-10-CM

## 2024-11-11 DIAGNOSIS — Z17.1 MALIGNANT NEOPLASM OF OVERLAPPING SITES OF RIGHT BREAST IN FEMALE, ESTROGEN RECEPTOR NEGATIVE: Primary | ICD-10-CM

## 2024-11-11 DIAGNOSIS — C50.811 MALIGNANT NEOPLASM OF OVERLAPPING SITES OF RIGHT BREAST IN FEMALE, ESTROGEN RECEPTOR NEGATIVE: ICD-10-CM

## 2024-11-11 DIAGNOSIS — C50.811 MALIGNANT NEOPLASM OF OVERLAPPING SITES OF RIGHT BREAST IN FEMALE, ESTROGEN RECEPTOR NEGATIVE: Primary | ICD-10-CM

## 2024-11-11 DIAGNOSIS — Z79.899 HIGH RISK MEDICATION USE: ICD-10-CM

## 2024-11-11 DIAGNOSIS — Z17.1 MALIGNANT NEOPLASM OF OVERLAPPING SITES OF RIGHT BREAST IN FEMALE, ESTROGEN RECEPTOR NEGATIVE: ICD-10-CM

## 2024-11-11 LAB
ALBUMIN SERPL-MCNC: 4.1 G/DL (ref 3.5–5.2)
ALBUMIN/GLOB SERPL: 1.5 G/DL
ALP SERPL-CCNC: 68 U/L (ref 39–117)
ALT SERPL W P-5'-P-CCNC: 13 U/L (ref 1–33)
ANION GAP SERPL CALCULATED.3IONS-SCNC: 7.1 MMOL/L (ref 5–15)
AST SERPL-CCNC: 21 U/L (ref 1–32)
BASOPHILS # BLD AUTO: 0.05 10*3/MM3 (ref 0–0.2)
BASOPHILS NFR BLD AUTO: 1.1 % (ref 0–1.5)
BILIRUB SERPL-MCNC: 0.5 MG/DL (ref 0–1.2)
BUN SERPL-MCNC: 24 MG/DL (ref 8–23)
BUN/CREAT SERPL: 25 (ref 7–25)
CALCIUM SPEC-SCNC: 9.1 MG/DL (ref 8.6–10.5)
CHLORIDE SERPL-SCNC: 101 MMOL/L (ref 98–107)
CO2 SERPL-SCNC: 31.9 MMOL/L (ref 22–29)
CREAT SERPL-MCNC: 0.96 MG/DL (ref 0.57–1)
DEPRECATED RDW RBC AUTO: 64.5 FL (ref 37–54)
EGFRCR SERPLBLD CKD-EPI 2021: 66.6 ML/MIN/1.73
EOSINOPHIL # BLD AUTO: 0.09 10*3/MM3 (ref 0–0.4)
EOSINOPHIL NFR BLD AUTO: 1.9 % (ref 0.3–6.2)
ERYTHROCYTE [DISTWIDTH] IN BLOOD BY AUTOMATED COUNT: 17.1 % (ref 12.3–15.4)
GLOBULIN UR ELPH-MCNC: 2.8 GM/DL
GLUCOSE SERPL-MCNC: 118 MG/DL (ref 65–99)
HCT VFR BLD AUTO: 38.2 % (ref 34–46.6)
HGB BLD-MCNC: 12.4 G/DL (ref 12–15.9)
IMM GRANULOCYTES # BLD AUTO: 0.02 10*3/MM3 (ref 0–0.05)
IMM GRANULOCYTES NFR BLD AUTO: 0.4 % (ref 0–0.5)
LYMPHOCYTES # BLD AUTO: 1.06 10*3/MM3 (ref 0.7–3.1)
LYMPHOCYTES NFR BLD AUTO: 22.6 % (ref 19.6–45.3)
MCH RBC QN AUTO: 33.4 PG (ref 26.6–33)
MCHC RBC AUTO-ENTMCNC: 32.5 G/DL (ref 31.5–35.7)
MCV RBC AUTO: 103 FL (ref 79–97)
MONOCYTES # BLD AUTO: 0.52 10*3/MM3 (ref 0.1–0.9)
MONOCYTES NFR BLD AUTO: 11.1 % (ref 5–12)
NEUTROPHILS NFR BLD AUTO: 2.94 10*3/MM3 (ref 1.7–7)
NEUTROPHILS NFR BLD AUTO: 62.9 % (ref 42.7–76)
NRBC BLD AUTO-RTO: 0 /100 WBC (ref 0–0.2)
PLATELET # BLD AUTO: 233 10*3/MM3 (ref 140–450)
PMV BLD AUTO: 10 FL (ref 6–12)
POTASSIUM SERPL-SCNC: 4.8 MMOL/L (ref 3.5–5.2)
PROT SERPL-MCNC: 6.9 G/DL (ref 6–8.5)
RBC # BLD AUTO: 3.71 10*6/MM3 (ref 3.77–5.28)
SODIUM SERPL-SCNC: 140 MMOL/L (ref 136–145)
WBC NRBC COR # BLD AUTO: 4.68 10*3/MM3 (ref 3.4–10.8)

## 2024-11-11 PROCEDURE — 36415 COLL VENOUS BLD VENIPUNCTURE: CPT

## 2024-11-11 PROCEDURE — 85025 COMPLETE CBC W/AUTO DIFF WBC: CPT | Performed by: INTERNAL MEDICINE

## 2024-11-11 PROCEDURE — 80053 COMPREHEN METABOLIC PANEL: CPT | Performed by: INTERNAL MEDICINE

## 2024-11-11 NOTE — PROGRESS NOTES
"Chief Complaint  Previous Stage Ib ([id number]) ER/[place] positive, HER-2/peter negative right breast cancer with subsequent metastatic disease identified 10/8/2017, history of right pulmonary embolism, cancer related pain, chemotherapy-induced diarrhea, chemotherapy-induced mucositis, chemotherapy-induced hand-foot syndrome    Subjective        History of Present Illness  The patient returns today in follow-up with continuing on Xeloda 1000 mg in the morning, 1500 mg in the evening for 7 days on followed by 7 days off as well as every 3-month Xgeva (due again in January 2025) and Eliquis 5 mg twice daily.  The patient has been maintained on treatment with single agent Xeloda since 2/7/2018.  She does continue with chronic side effects from treatment including hand-foot syndrome and sclerodactyly.  She continues to use emollient cream frequently 4-5 times per day as well as topical triamcinolone intermittently (2 weeks on followed by 2 weeks off as instructed by dermatology).     Since we last saw the patient she saw Dr. 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  This is tentatively scheduled for 12/10/2024.    Patient is also having more pain and heaviness in her arms bilaterally which she also feels is likely related to her CMT.    She does continue in grief counseling through hospice after the death of her  earlier this year.  She is finding benefit through this, sometimes going weekly sometimes going more monthly.      She denies other concerns today.    Objective   Vital Signs:   /83   Pulse 95   Temp 98.7 °F (37.1 °C) (Oral)   Resp 16   Ht 154.9 cm (60.98\")   Wt 82.2 kg (181 lb 4.8 oz)   SpO2 96%   BMI 34.27 kg/m²     Physical Exam  Constitutional:       Appearance: She is well-developed.      Comments: Patient ambulates with the use of a cane   Eyes:      " Conjunctiva/sclera: Conjunctivae normal.   Neck:      Thyroid: No thyromegaly.   Cardiovascular:      Rate and Rhythm: Normal rate and regular rhythm.      Heart sounds: No murmur heard.     No friction rub. No gallop.   Pulmonary:      Effort: No respiratory distress.      Breath sounds: Normal breath sounds.   Abdominal:      General: Bowel sounds are normal. There is no distension.      Palpations: Abdomen is soft.      Tenderness: There is no abdominal tenderness.   Musculoskeletal:      Comments: Braces in place in the bilateral lower extremities   Lymphadenopathy:      Head:      Right side of head: No submandibular adenopathy.      Cervical: No cervical adenopathy.      Upper Body:      Right upper body: No supraclavicular adenopathy.      Left upper body: No supraclavicular adenopathy.   Skin:     General: Skin is warm and dry.      Findings: Rash and wound (left lateral foot - see image under MEDIA today) present.      Comments: Erythema involving the palms has improved and is minimal.  There is minimal residual erythema in the dorsal aspect of the hands.  There is no significant erythema in the palmar surfaces however there is significant skin cracking, no current peeling.  There is sclerodactyly which is fairly stable from her last exam.   Neurological:      Mental Status: She is alert and oriented to person, place, and time.      Cranial Nerves: No cranial nerve deficit.      Motor: No abnormal muscle tone.      Deep Tendon Reflexes: Reflexes normal.   Psychiatric:         Behavior: Behavior normal.           Result Review :   Results from last 7 days   Lab Units 11/11/24  1213   WBC 10*3/mm3 4.68   NEUTROS ABS 10*3/mm3 2.94   HEMOGLOBIN g/dL 12.4   HEMATOCRIT % 38.2   PLATELETS 10*3/mm3 233     Results from last 7 days   Lab Units 11/11/24  1213   SODIUM mmol/L 140   POTASSIUM mmol/L 4.8   CHLORIDE mmol/L 101   CO2 mmol/L 31.9*   BUN mg/dL 24*   CREATININE mg/dL 0.96   CALCIUM mg/dL 9.1   ALBUMIN g/dL  4.1   BILIRUBIN mg/dL 0.5   ALK PHOS U/L 68   ALT (SGPT) U/L 13   AST (SGOT) U/L 21   GLUCOSE mg/dL 118*           Assessment and Plan     *Previous Stage Ib ([id number]) right breast cancer:  Diagnosed May 2010 with excisional biopsy for invasive ductal carcinoma, 1.3 cm, grade 2, ER 90%, DE 80%, HER-2/peter negative (1+ IHC).    Subsequent right mastectomy in July 2010 with no residual breast malignancy, 1/5 sentinel lymph node with micrometastasis (0.25 mm).    Treated in the [facility] system with adjuvant AC ×4 cycles in 2010 (no taxanes administered due to underlying Charcot-Saloni-Tooth with peripheral neuropathy).    Adjuvant Femara (postmenopausal) initiated October 2010 with plan to treat ×10 years.    Genetic testing reportedly negative.    Developed osteopenia treated with Prolia beginning 2/27/13. Subsequently discontinued due to identification of metastatic disease.    *Recurrent/metastatic disease identified 10/8/17:  Disease involving thoracic spine with cord compression at T6, lumbosacral involvement, sternal and right sternoclavicular involvement.    Femara discontinued in 10/2017.    Radiation administered (in the [facility] system) to the thoracic spine beginning 10/19/17 treating T3-T9 to a dose of 24 gray in 6 fractions.  Evaluation with MRI 12/8/17 showing persistent T6 cord compression with persistent neurologic compromise requiring surgical treatment 12/11/17 with T6 laminectomy/corpectomy and T3-T9 fusion.  Pathology with metastatic carcinoma of breast origin, ER negative, DE negative, HER-2/peter negative (1+ IHC).  Additional staging evaluation 12/8/17 with no evidence of visceral metastatic disease, bone scan showing involvement of thoracic spine, sternum, left humerus, mid frontal bone.  No plane film correlate of left humerus lesion.  MRI lumbar spine with small intradural L3 metastasis.  CA 15-3 12/6/17- 17.  Palliative radiation therapy to L3 dural metastasis and left humerus initiated 1/15/18  (10 fractions), completed 1/26/18.  Hypercalcemia of malignancy with calcium in the 10-11 range.  Initiation of monthly Xgeva 1/23/18.  Baseline CT scan 1/30/18 with no evidence of visceral involvement.  Cluster of nodular opacities in the right lower lobe suspected to be infectious or related to bronchiolitis. Bone scan 1/30/18 showed postsurgical change in the thoracic spine, stable uptake in the frontal bone, no new areas of disease.  Initiation of palliative oral single agent Xeloda 2/7/18 2000 mg a.m., 1500 mg p.m. for 14/21 days.   Following 3 cycles xeloda, bone scan 4/4/18 showed no change from the prior study.  CT scan 4/4/18 showed a small pericardial effusion of unclear significance as well as a subcutaneous nodule in the right lateral chest wall.  Subsequent evaluation with echocardiogram 4/17/18 showed no evidence of pericardial effusion.  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  We discussed the possibility of surgical excision of the right subcutaneous chest wall lesion for more definitive diagnosis.  Reviewed previous CT images dating back to 12/8/17 and the lesion was present even at that time measuring around 1.7 cm although not commented on in the radiology report.  As this appears to be an indolent low-grade process unrelated to her breast cancer, recommendee foregoing surgical excision at this time and monitoring this area on future scans.  The patient agreed.    Following 6 cycles of Xeloda, CT 6/6/18  showed stable findings, no evidence of progressive disease.  There was a comment regarding subcutaneous abnormality in the anterior abdominal wall and this was related to Lovenox injection sites.  Bone scan 6/6/18 showed no interval change.   CT scan and bone scan 8/13/18 following 9 cycles of Xeloda showed stable findings with no evidence of  significant visceral metastases.  Her bone lesions appear stable on bone scan.  The spindle cell neoplasm in her right chest wall actually decreased in size from 2 cm down to 1.6 cm.    The patient experienced some symptoms of diarrhea, anorexia, generalized weakness during cycle 9 Xeloda it was unclear whether this was related to a viral gastroenteritis or toxicity from treatment.  Symptoms recurred during cycle 10 and treatment was cut short by 2 days.  Symptoms attributed to Xeloda.  With cycle 11, dose and schedule altered to 1500 mg twice daily for 7 days on followed by 7 days off .  CT scan 9/9/2020 with no significant changes.  Bone scan 9/9/2020 read as unchanged from prior studies however did note an area of slight activity in the medial left femur.  Contacted radiology and although this was not noted on prior reports appears to have been present.  Subsequent MRI left femur 9/21/2020 with cortical thickening and periosteal edema left iliac us muscle insertion to the medial left femur with no evidence of metastatic disease, favored to represent periosteal change secondary to insertional tendinitis.  Severe hand-foot symptoms causing sclerodactyly and limitation in finger movement prompted change in dosing in July 2021 with Xeloda decreased to 1000 mg a.m., 1500 mg p.m. for 7 days on followed by 7 days off.  Patient was experiencing severe hand-foot syndrome related to Xeloda having developed skin peeling, sclerodactyly with limited use of her hands.  On 3/31/2022, Xeloda was held to allow for recovery.  Patient resumed Xeloda on 4/18/2022.  Patient required hospitalization 5/29 - 6/1/2022 with presumed viral gastroenteritis that was exacerbated by Xeloda.  Xeloda held briefly, resumed on 6/6/2022.  Patient again with worsening sclerodactyly, Xeloda held for 2 weeks from 10/3 - 10/17/2022, resumed at prior dose with improvement in symptoms.  Scans on 3/3/2023 with CT chest abdomen pelvis and bone scan showing  stable findings.  CT chest abdomen pelvis 7/3/2023 with questionable 1 mm change in size right lower lobe nodule.  Additional small pulmonary nodules stable.  Stable bony metastatic disease.  Bone scan on 7/7/2023 however showed slight progression focal involvement right ischium and superior pubic ramus (new ischial lesion superior pubic ramus compared to 10/24/2022 and more conspicuous compared to 3/3/2023).  Metastatic lesion right inferior pubic ramus and ischial tuberosity increased in size since October 2022 however stable from 3/3/2023.  MRI cervical spine 7/3/2023 with no metastatic involvement however identification of the lesion at T2, recommended dedicated thoracic spine MRI to evaluate further.  Given the minimal extent and slow degree of progression, elected to continue oral Xeloda and evaluate further with MRI pelvis and thoracic spine.  Consideration of biopsy pelvic metastasis for repeat ER/CO/HER2/peter testing.    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  CA 15-3 on 7/10/2023 was 12.2, uninformative.  MRI thoracic spine 7/27/2023 with 1 cm metastatic focus seen in L1  MRI pelvis 7/27/2023 with metastatic foci identified right superior pubic ramus, right ischial tuberosity, inferior pubic ramus, L5 body.  Chronic appearing posttraumatic and/or degenerative change in the posterior right ilium adjacent SI joint.  CT-guided biopsy right pubic ramus lesion on 8/31/2023.  Pathology showed no evidence of malignancy, showed markedly calcified and sclerotic mature lamellar bone.  Attempted decalcification but no evidence of malignancy.  CT chest abdomen pelvis 9/8/2023 and bone scan 9/11/2023 with stable findings.  CT chest abdomen pelvis 12/8/2023 with possible slight increase in right chest wall subcutaneous lesion (1.8 x 1.1 up to 1.8 x 1.4 cm) although may be related to altered positioning.  Nonpathologically enlarged right axillary and subpectoral  lymph nodes slightly increased (patient notes she received RSV vaccine right arm just prior to scan).  Bone scan 12/11/2023 with stable findings.  CT chest abdomen pelvis 3/19/2024 with slight interval decrease in size of right axillary and bilateral subpectoral lymph nodes, right subcutaneous soft tissue nodule slightly smaller, stable pulmonary nodules, stable bone metastases.  Bone scan 3/19/2024 with stable findings.  CT chest abdomen pelvis 6/21/2024 showed left subpectoral lymph node to have increased slightly in size (0.9 x 0.6 up to 1.3 x 0.8 cm).  Subcutaneous nodule right chest wall decreased from 1.1 x 1.9 down to 1.1 x 1.4 cm.  Scan otherwise stable.  Bone scan 6/21/2024 with stable findings  CT chest abdomen pelvis 9/23/2024 with resolution of left subpectoral lymphadenopathy, additional findings stable.  Bone scan 9/23/2024 with stable findings.  The patient returns today continuing on treatment with Xeloda 1000 mg a.m., 1500 mg p.m. 7 days on followed by 7 days off.  She is continuing on Xgeva every 3 months, due again January 2025.  She has been on single agent Xeloda since 2/7/2018.  Her scans from 9/23/2024 showed stable findings, resolution of borderline left subpectoral lymphadenopathy.  She continues to tolerate treatment reasonably well.  Hand-foot syndrome does persist however symptoms have actually improved somewhat over time with resolution of erythema that had been extending onto the dorsal aspect of her hands, resolution of the erythema involving her palmar surface, no current skin peeling although there is significant skin scaling and cracking.  This does produce some degree of sclerodactyly which is fairly stable, does affect her dexterity to some extent although she feels that this is manageable.  She will continue on treatment without alteration with the exception of upcoming surgery (see further details below).  She has been instructed to hold her Xeloda 1 week before surgery and she  will not resume until she follows up with Dr. Hancock in January.    *Right pulmonary embolism:  Diagnosed on CT angiogram 10/21/17 involving small right lower lobe pulmonary artery.  Lower extremity Dopplers negative.  Bilateral lower extremity Dopplers negative again 12/5/17.  Received chronic Lovenox 1 mg/kg twice per day, transitioned to oral Eliquis in February 2019, continuing on Eliquis 5 mg twice daily.  Patient continues on Eliquis 5 mg twice daily    *Cancer related pain:  Previously receiving Duragesic 50 µg patch every 72 hours along with Dilaudid 4 mg as needed for breakthrough pain  The patient's pain improved over time and she was able to discontinue both Duragesic and Dilaudid in the interval.  Patient does take occasional Flexeril at bedtime due to back spasm/pain when she has been more active.  The patient does have some occasional aggravation of her chronic back pain and does use tramadol 50 mg every 8 hours as needed  Patient was receiving tramadol 50 mg every 8-12 hours as needed in addition to hydrocodone 5/325 every 4 hours as needed.  She was also taking OTC NSAIDs.  Patient developed nausea related to hydrocodone and was transitioned to Percocet 5/325 every 4 hours as needed, advised to stop taking NSAIDs with ongoing anticoagulation.  Patient notes pain in her shoulders that does wax and wane is unrelated to her malignancy.  She does continue on tramadol 50 mg every 8 hours and Percocet 5/325 every 4 hours as needed    *Chemotherapy-induced diarrhea with subsequent C. difficile colitis in the setting of previous ulcerative colitis and then identification of enteropathogenic E. coli:  Patient with reported history of ulcerative colitis, continues on mesalamine.  The patient developed initial diarrhea related to Xeloda at regular dosing.  Symptoms improved on reduced dose Xeloda  Flare of symptoms in October 2018 with apparent finding of C. difficile colitis by GI, treated with course of oral  vancomycin with improvement in symptoms.  Patient notes minimal intermittent diarrhea/loose stools on Xeloda requiring occasional dosing of Imodium.    Patient required hospitalization 5/29 - 6/1/2022 with presumed viral gastroenteritis that was exacerbated by Xeloda.  Xeloda held briefly, resumed on 6/6/2022.  Patient's  developed C. difficile colitis and patient was experiencing increase in diarrhea.  Stool studies performed 8/4/22 negative C. difficile however GI PCR was positive for enteropathogenic E. coli.  Given patient's symptoms and immunocompromise status it was elected to treat her with azithromycin 500 mg daily x3 days beginning 8/5/2022  Diarrhea resolved  Patient underwent colonoscopy on 2/28/2023 with findings of diverticulosis  Patient notes fairly infrequent diarrhea.  Every few weeks and requires minimal dosing of Imodium.    *Traumatic left tibia/fibular fracture:  Status post ORIF 12/6/17  Specimen was sent for pathologic review, negative for evidence of malignancy    *Hypercalcemia:  Suspect hypercalcemia of malignancy, calcium in  10-11 range previously.  Calcium normalized following initiation of monthly Xgeva on 1/23/18.    *Chemotherapy-induced mucositis:  Patient has not experienced any recent difficulty with mucositis.    *Recurrent UTI, bladder wall thickening on CT:  Patient had an enterococcal UTI on 3/2/18 sensitive to nitrofurantoin and received treatment, unclear how long.  Recurrent UTI 3/20/18 with urine culture growing Klebsiella, initially treated with nitrofurantoin, transitioned to Levaquin.  CT 4/4/18 with diffuse bladder wall thickening with increased nodular thickening at the left base.  Referral to urogynecology Dr. May Johnson.  She was placed on a prophylactic dose of trimethoprim 100 mg daily, bladder wall thickening felt to be related to recent recurrent urinary tract infections.  Patient with urinary symptoms, treated with course of Macrobid at the end of  December 2018, urine culture however was negative 12/31/18.  Patient was found to have Klebsiella UTI 7/29/2019 which was successfully treated with Macrobid with complete resolution of symptoms.  CT 8/10/2021 with diffuse bladder wall thickening new from 5/17/2021 (however seen on multiple prior scans).    UTI on 10/18/2021 with E. coli greater than 100,000 colonies that was pansensitive, treated with Macrobid x7 days  With ongoing/recurrent UTIs patient was seen by urogynecology and initiated suppressive therapy with trimethoprim 100 mg daily in December 2021.  She has not experienced any further urinary tract infections.  CT abdomen pelvis 3/28/2022 does show diffuse thickening of urinary bladder as has been seen on prior studies indicative of cystitis.  Patient notes that she did stop taking trimethoprim on a daily basis.    Patient with urine culture on 5/5/2023 that grew E. coli and she received antibiotic treatment from urogynecology.    Urine culture on 8/23/2023 with greater than 100,000 colonies of E. coli resistant to ampicillin and Bactrim.  Received 5-day course of Cipro with resolution of symptoms.  Patient did have UTI with E. coli on culture 10/2/2023, received a course of Augmentin.  Patient with ongoing intermittent urinary tract infections.  She has been placed on prophylactic dose of Keflex daily per her PCP and was referred to a new urogynecologist.  She was also prescribed Estrace cream (see above discussion).    *Charcot-Saloni-Tooth with mobility difficulties:  The patient underwent an intensive course of rehabilitation at [facility].  She graduated from her outpatient course November 2018.  Overall the patient has improved dramatically in terms of mobility,   Patient developed significant callus formation lateral aspect of the left plantar surface.    Patient has developed skin erosion and an ulceration on the lateral aspect of her left foot.  She has seen Dr. Rosado at [facility] and now will  require surgery, scheduled 12/10/2024.  We have discussed holding her Xeloda surrounding this as outlined above.  She anticipates being discharged to an inpatient rehab facility.      *Depression:  The patient is continuing follow-up in the supportive oncology clinic and is currently continuing on Cymbalta 30 mg twice daily as well as gabapentin 900 mg nightly, Ativan 0.5 mg nightly as needed, Provigil 100 mg daily.  Patient does continue to attend support groups at [employer].  The patient does continue routine follow-up in supportive oncology clinic.    Patient does have multiple stressors with her 's malignancy and chemotherapy as well as her autistic son who is living with them at home.  Patient reports that she continues to deal with grief related to her 's death this past year.  She continues to see a grief counselor through hospice which has helped significantly.  She continues routine follow-up with supportive oncology as well.    *Hand-foot syndrome secondary to Xeloda:  Patient continues with frequent application of emollient cream to the hands and feet  Symptoms increased significantly requiring a 1 week delay in cycle 18 Xeloda as noted above.  Symptoms did improve and she continued on the same dose.    Patient was referred to dermatology and has been continuing on triamcinolone 0.1% cream used for 1 week on followed by 1 week off which led to some further improvement.   Progressive palmar erythema with development of sclerodactyly and effect on dexterity.  Addition of urea-based cream 3 times daily.  Patient with continued difficulty regarding erythema of her hands that extended onto the dorsal aspect and was causing contractures/sclerodactyly in her fingers affecting her dexterity.  In July 2021, held Xeloda for an additional week and then reduced dose from 1500 mg twice daily down to 1000 mg a.m. and 1500 mg p.m. and continued on a 7-day on followed by 7-day off schedule.  With  reduction in Xeloda dose, slight improvement in erythema involving dorsal aspect of the hands and slight decrease in sclerodactyly  Patient was experiencing severe hand-foot syndrome related to Xeloda having developed skin peeling, sclerodactyly with limited use of her hands.  On 3/31/2022, Xeloda was held to allow for recovery.  Patient resumed Xeloda on 4/18/2022.  Patient developed worsening sclerodactyly affecting dexterity that required Xeloda to again be briefly held for 2 weeks from 10/3 - 10/17/2022.  Treatment resumed at prior dose after improvement in symptoms.  Patient continues to use emollient cream frequently (currently 5 times daily) and topical triamcinolone 0.1% twice daily intermittently (2 weeks on followed by 2 weeks off as instructed by dermatology).  Patient with overall improvement in involvement of the hands, no current erythema but continued scaling and cracking of the skin on the palmar surface.  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  She feels that symptoms are tolerable.  Symptoms involving the feet are significantly less than the hands.  Unclear to what extent Xeloda is also contributing to patient's left foot wound although apparently that is healing well currently.    *Evidence of steatosis on scans with mild intermittent elevated liver function studies:  Liver function studies increased 8/20/19 with ALT 98, AST 70, normal total bilirubin.  Negative viral hepatitis A, B, and C panel 8/23/18  Likely related to hepatic steatosis.   Subsequent improvement in LFTs  LFTs today are normal    *Chemotherapy induced leukopenia:  WBC today is normal at 4.68    *GERD, question of celiac disease:  Patient with significant history of reflux  Patient currently receiving Protonix 40 mg daily daily and Carafate 1 g 4 times daily as needed  Patient required hospitalization 5/29 - 6/1/2022 with presumed viral gastroenteritis that was  exacerbated by Xeloda.    EGD performed 5/31/2022 during hospitalization with biopsy that showed focal blunting of villi and atrophy with slight increase in intraepithelial lymphocytes.  Changes were minimal but recommended correlation with serology to exclude celiac disease.  Celiac serology 8/8/2022 negative  Patient previously developed worsening reflux symptoms, temporarily increase Protonix to 40 mg twice daily and we did add Carafate 1 g 4 times daily.    She is taking Protonix 40 mg once daily, is not taking Carafate.    *Insomnia:  Patient with prior paradoxical reaction to Benadryl  Improved previously on temazepam 15 mg nightly as needed.   Patient noted subsequently that temazepam was having no effect.   Patient continuing on gabapentin 900 mg nightly    *Health maintenance:  Patient notes that she has a history of colon polyps as well as ulcerative colitis and was due for a follow-up colonoscopy on 9/12/2019.  We did discuss there is no necessity to pursue colonoscopy in the setting of her metastatic breast cancer.    The patient did undergo colonoscopy on 2/7/2020 with findings of muscular hypertrophy and diverticulosis.   Patient did wish to proceed with further colonoscopic evaluation, performed on 12/20/2022 with poor prep.  Repeat 2/28/2023 with diverticulosis.    *Bilateral shoulder pain:  Chronic difficulty with right shoulder although she has not complained of this in prior visits to our office.  She reported difficulty with abduction.    The patient did undergo MRI of her right shoulder on 11/21/2019 at an outside facility showing multiple abnormalities including supraspinatus tendinosis, labral tear but no evidence of metastatic disease.  Patient developed pain in the left shoulder, was seen by orthopedics and underwent steroid injection with some improvement.  Right shoulder stable.  Patient did undergo physical therapy with some improvement.  She continues to use tramadol 50 mg every 8-12  hours as needed.  Note that current CT 10/20/2022 made notation of left shoulder probable bursitis.    Patient continues with bilateral shoulder pain, left greater than right which does wax and wane.      *Ocular changes in part related to Xeloda:  Patient experienced a mild degree of blurred vision as well as burning and pruritus.  She was seen by her ophthalmologist and was placed on xiidra ophthalmic drops which have helped.  Likely both issues are to some extent related to Xeloda.  Symptoms did worsen and patient was seen by ophthalmologist at [facility].  She is now using an ocular lubricant at night in addition to artificial tears which have helped.  Symptoms currently stable to improved    *Elevated glucose:  It is noted the patient's glucose at the last few visits has been in the high 100 range, postprandial.  She has had a hemoglobin A1c that has been in the high 5-6 range in the past  Hemoglobin A1c was last checked on 9/9/2024 at 5.9    *Possible loosening of pedicle screw at T3:  CT cervical spine 5/17/2021 showed loosening of the left pedicle screw at T3.  Patient referred to orthopedics and was seen by Dr. Nayak who felt that there were no concerning findings on the scan    *Iron deficiency anemia  Labs on 5/2/2022 with hemoglobin 10.8.  Additional labs with iron 48, ferritin 21.2, iron saturation 11%, TIBC 4 and 32, folate greater than 20, B12 greater than 2000.    Attempted treatment with oral iron daily however patient was intolerant  Patient subsequently received Venofer 300 mg weekly x4 beginning 6/6/2022 and completed on 6/27/2022  Subsequent iron studies on 7/11/2022 showed improvement with iron 62, ferritin 345, iron saturation 18%, TIBC 336.  Hemoglobin had improved up to 12.7 at that time.  Repeat iron studies on 10/3/2022 showed iron replete status with hemoglobin 13.7, iron 121, ferritin 208.7, iron saturation 31%, TIBC 392.  Hemoglobin currently normal at 12.4      Plan:  Continue palliative single agent Xeloda 1000 mg a.m., 1500 mg in the p.m. 7 days on followed by 7 days off (patient has been on single agent Xeloda since 2/7/2018)  Proceed with every 3-month Xgeva, due again in January 2025  Continue Eliquis 5 mg twice daily  The patient will continue frequent use of emollient cream currently 5 times daily and continue periodic triamcinolone cream 0.1% twice daily (provided by dermatology) 2 weeks on followed by 2 weeks off as prescribed  Continue Protonix 40 mg daily  Continue ocular lubricating gel nightly per ophthalmology  Continue Provigil 100 mg daily and Cymbalta 30 mg twice daily.  Patient continues routine follow-up with supportive oncology   Patient will continue gabapentin 900 mg at night.   Patient continues on tramadol 50 mg every 8 hours as needed for pain  Continue Percocet 5/325 every 4 hours as needed for pain  Patient continues with grief counseling with hospice in regards to recent death of her   Patient has surgery scheduled with Dr. Rosado 12/10/2024.  1 week prior she will hold Xeloda.  She has been instructed following surgery to not resume Xeloda until she sees Dr. Hancock in follow-up 1/6/2025.  Return 1/6/2025 for follow-up with Dr. Hancock with CBC, CMP, magnesium, phosphorus.  Patient will be due for Xgeva.  Tentative plans to resume Xeloda at that visit.  We will plan to perform CT scans approximately 4 months from current studies and we will order these at return visit.     Patient continuing on high risk medication requiring intensive monitoring.       I spent 42 minutes caring for Martha on this date of service. This time includes time spent by me in the following activities: preparing for the visit, reviewing tests, performing a medically appropriate examination and/or evaluation, counseling and educating the patient/family/caregiver, referring and communicating with other health care professionals, documenting information in the  medical record, independently interpreting results and communicating that information with the patient/family/caregiver, care coordination, obtaining a separately obtained history, and reviewing a separately obtained history

## 2024-11-12 ENCOUNTER — SPECIALTY PHARMACY (OUTPATIENT)
Dept: PHARMACY | Facility: HOSPITAL | Age: 64
End: 2024-11-12
Payer: MEDICARE

## 2024-11-12 NOTE — TELEPHONE ENCOUNTER
"Patient did not meet with pharmacy via telemedicine during her visit @ [place] on 8/23/2021. I will contact patient during her \"off\" week of medicine to coordinate her next refill.     Yolanda Meyers - Care Coordinator   8/24/2021  11:45 EDT      "
[facility] Refill Outreach    Medication: [place]    Called patient to arrange shipment of medication. She believes that she has an unopened bottle and is not ready to coordinate a refill today. She has an appointment on 8/23 at which time we'll access her refill need. I provided by call back number of [phone].     Yolanda Meyers, Pharmacy Technician  8/16/2021  13:12 EDT          
Lives alone at home - daughter close by and checks in on her. Uses 3L of oxygen when sleeping. Uses a cane as her assistive walking device

## 2024-11-12 NOTE — PROGRESS NOTES
Specialty Pharmacy Note: Xeloda (capecitabine)    Martha Davey is a 63 y.o. female with breast cancer was seen 11/11/24 by APRN. Per provider dictation, no changes to oral oncology regimen Xeloda (capecitabine).  Labs Review: The CMP and CBC from 11/11/24 have been reviewed. No dose adjustments are needed for the oral specialty medication(s) based on the labs. Of note, xeloda to be on hold for surgery with plans to resume after follow up with oncologist in January 2025.    Specialty pharmacy will continue to follow patient.    Lubna Gupta, PharmD, BCPS  11/12/2024  09:26 EST   [FreeTextEntry1] : This note was written by Bisi Parisi on 01/11/2023 acting as scribe for Omid Hidalgo III, MD

## 2024-11-21 ENCOUNTER — TELEMEDICINE (OUTPATIENT)
Dept: PSYCHIATRY | Facility: HOSPITAL | Age: 64
End: 2024-11-21
Payer: MEDICARE

## 2024-11-21 DIAGNOSIS — F43.21 GRIEF: ICD-10-CM

## 2024-11-21 DIAGNOSIS — F33.1 MAJOR DEPRESSIVE DISORDER, RECURRENT EPISODE, MODERATE: ICD-10-CM

## 2024-11-21 DIAGNOSIS — R53.0 NEOPLASTIC MALIGNANT RELATED FATIGUE: Primary | ICD-10-CM

## 2024-11-21 DIAGNOSIS — C50.919 PRIMARY MALIGNANT NEOPLASM OF BREAST WITH METASTASIS: ICD-10-CM

## 2024-11-21 PROCEDURE — 1159F MED LIST DOCD IN RCRD: CPT | Performed by: NURSE PRACTITIONER

## 2024-11-21 PROCEDURE — 1160F RVW MEDS BY RX/DR IN RCRD: CPT | Performed by: NURSE PRACTITIONER

## 2024-11-21 PROCEDURE — 99214 OFFICE O/P EST MOD 30 MIN: CPT | Performed by: NURSE PRACTITIONER

## 2024-11-21 RX ORDER — MODAFINIL 200 MG/1
200 TABLET ORAL DAILY
Qty: 30 TABLET | Refills: 2 | Status: SHIPPED | OUTPATIENT
Start: 2024-11-21

## 2024-11-21 NOTE — PROGRESS NOTES
Behavioral Oncology Services  Video visit conducted via [facility] Video Visit  You have chosen to receive care through a telehealth visit.  Do you consent to use a video/audio connection for your medical care today? YES  Telehealth provided in patient's home.  Location of Provider: [place], [place]     Subjective  Patient ID: Martha Davey is a 64 y.o. female is seen via telehealth in the Behavioral Oncology Clinic.    CC: Grief, Frustrated with foot injury related to CMT    HPI/ Interval History:   Pt is seen in follow up regarding anxiety, depression, and grief in survivorship of breast cancer, recent loss of  to metastatic cancer.    Sleep remains disrupted, going to bed appx 12:30, asleep by 2 AM, sleeping in between 10:30-12. Identifies having several things to do although has been more sedentary, unfortunately exacerbated by severe pain in recent months. Foot has become progressively worse over time, anticipating upcoming surgery in December. Expects three months of non weight bearing rest following this. Mourns ongoing medical complexity inlcudive of shouldrs, foot, and recurrent UTIs. Remains concerned about sons, specifically oldest and middle, while greatly appreciating rapport with youngest son. Identifies ongoing difficulty communicating needs with special needs son, noting interactions remain difficult. Identifies friend Ines as closest confidant.     Did got to [place] l[place] night, appreciating ability to attend this and enjoying time with friends. Has not been able to go to football games, using most of energy to attend medical visits, etc.    Denies thoughts of self harm,     Exam: As above    Current Psychotropic Medications:  Duloxetine 60 mg daily  Modafinil 100 mg q AM  Lorazepam 0.5 mg PRN sleep    Plan of Care/ Medical Decision Making  Discontinue lorazapem, specifically identifying target sx of reduced engagement, paralysis of initiative. Considered importance of scheduling of  activities, routine, self care opportunities. Continue modafinil to assist with increasing engagement.  Continue duloxetine, dual benefit to pain.  Close FU recommended in person, specifically amidst upcoming holidays, first without spouse.  FU scheduled in 2 weeks.    Diagnoses and all orders for this visit:    1. Neoplastic malignant related fatigue (Primary)  -     modafinil (PROVIGIL) 200 MG tablet; Take 1 tablet by mouth Daily.  Dispense: 30 tablet; Refill: 2    2. Metastatic breast cancer    3. Grief    4. Major depressive disorder, recurrent episode, moderate  -     modafinil (PROVIGIL) 200 MG tablet; Take 1 tablet by mouth Daily.  Dispense: 30 tablet; Refill: 2    I spent 34 minutes caring for Martha on this date of service. This time includes time spent by me in the following activities: preparing for the visit, reviewing tests, obtaining and/or reviewing a separately obtained history, performing a medically appropriate examination and/or evaluation, counseling and educating the patient/family/caregiver, ordering medications, tests, or procedures, and documenting information in the medical record.

## 2024-11-26 ENCOUNTER — SPECIALTY PHARMACY (OUTPATIENT)
Dept: PHARMACY | Facility: HOSPITAL | Age: 64
End: 2024-11-26
Payer: MEDICARE

## 2024-11-26 DIAGNOSIS — F41.1 GENERALIZED ANXIETY DISORDER: ICD-10-CM

## 2024-11-26 RX ORDER — CAPECITABINE 500 MG/1
TABLET, FILM COATED ORAL
Qty: 70 TABLET | Refills: 5 | Status: SHIPPED | OUTPATIENT
Start: 2024-11-26

## 2024-11-26 RX ORDER — LORAZEPAM 0.5 MG/1
0.5 TABLET ORAL NIGHTLY PRN
Qty: 15 TABLET | OUTPATIENT
Start: 2024-11-26

## 2024-11-26 NOTE — TELEPHONE ENCOUNTER
Specialty Pharmacy Patient Management Program  Per Protocol Prescription Order or Refill     Patient will be filling or currently fills medications at [facility] and is enrolled in the Patient Management Program.    Requested Prescriptions     Pending Prescriptions Disp Refills    capecitabine (XELODA) 500 MG chemo tablet [Pharmacy Med Name: Capecitabine Oral Tab 500 Mg (Aur)] 70 tablet 5     Sig: TAKE 2 TABLETS (1000MG) BY MOUTH EVERY MORNING AND 3 TABLETS (1500MG) BY MOUTH EVERY EVENING FOR 7 DAYS. TAKE 7 DAYS OFF, THEN REPEAT CYCLE.     Prescription orders above were sent to the pharmacy per Collaborative Care Agreement Protocol.     Last Office Visit:   Next Office Visit: 1/6/25    Dimitrios MorelosD, [facility]  Clinical Specialty Pharmacist, Oncology  11/26/2024  10:38 EST

## 2024-11-26 NOTE — TELEPHONE ENCOUNTER
Specialty Pharmacy Patient Management Program  Per Protocol Prescription Order or Refill       Requested Prescriptions     Signed Prescriptions Disp Refills    capecitabine (XELODA) 500 MG chemo tablet 70 tablet 5     Sig: TAKE 2 TABLETS (1000MG) BY MOUTH EVERY MORNING AND 3 TABLETS (1500MG) BY MOUTH EVERY EVENING FOR 7 DAYS. TAKE 7 DAYS OFF, THEN REPEAT CYCLE.     Authorizing Provider: SOHAM RICHARDS JR     Ordering User: LJ ENNIS     Prescription orders above were sent to [facility] per Collaborative Care Agreement Protocol.     Completed independent double check on medication order/RX.    Amanda Christian Rph, BCOP  Clinical Specialty Pharmacist, Oncology  11/26/2024  10:52 EST

## 2024-12-02 ENCOUNTER — TELEPHONE (OUTPATIENT)
Dept: ONCOLOGY | Facility: CLINIC | Age: 64
End: 2024-12-02
Payer: MEDICARE

## 2024-12-02 ENCOUNTER — OFFICE VISIT (OUTPATIENT)
Dept: PSYCHIATRY | Facility: HOSPITAL | Age: 64
End: 2024-12-02
Payer: MEDICARE

## 2024-12-02 DIAGNOSIS — R53.0 NEOPLASTIC MALIGNANT RELATED FATIGUE: Primary | ICD-10-CM

## 2024-12-02 DIAGNOSIS — F33.1 MAJOR DEPRESSIVE DISORDER, RECURRENT EPISODE, MODERATE: ICD-10-CM

## 2024-12-02 DIAGNOSIS — F43.21 GRIEF: ICD-10-CM

## 2024-12-02 DIAGNOSIS — C50.919 PRIMARY MALIGNANT NEOPLASM OF BREAST WITH METASTASIS: ICD-10-CM

## 2024-12-02 PROCEDURE — 99214 OFFICE O/P EST MOD 30 MIN: CPT | Performed by: NURSE PRACTITIONER

## 2024-12-02 PROCEDURE — 1159F MED LIST DOCD IN RCRD: CPT | Performed by: NURSE PRACTITIONER

## 2024-12-02 PROCEDURE — 1160F RVW MEDS BY RX/DR IN RCRD: CPT | Performed by: NURSE PRACTITIONER

## 2024-12-02 RX ORDER — DULOXETIN HYDROCHLORIDE 30 MG/1
30 CAPSULE, DELAYED RELEASE ORAL DAILY
Qty: 90 CAPSULE | Refills: 0 | Status: SHIPPED | OUTPATIENT
Start: 2024-12-02

## 2024-12-02 NOTE — TELEPHONE ENCOUNTER
Caller: Martha Davey    Relationship: Self    Best call back number: [phone]    What is the best time to reach you: ANYTIME    Who are you requesting to speak with (clinical staff, provider,  specific staff member): CLINICAL    What was the call regarding: PT WILL BE HAVING FOOT SURGERY ON 12-10 AND NEEDS TO KNOW WHEN TO STOP HER CANCER MEDICATION AND ELIQUIS     PLEASE ADVISE

## 2024-12-02 NOTE — PROGRESS NOTES
"Supportive Oncology Services  In Person Session    Subjective  Patient ID: Martha Davey is a 64 y.o. female is seen face to face in the Supportive Oncology Services (SOS) Clinic.    CC: Deprssion, grief    HPI/ Interval History:   Pt is seen in follow up regarding ongoing experience of depression and grief  alongside treatment for metastatic breast cancer, acute grief in aftermath of 's recent death.     Pain in foot remains excruciating, visably impacting patient ability to walk from waiting room to office. Rates this 9/10 while walking, 8/10 once able to sit. This contributing to significant increase in sedentary activities. Feels like everything is getting harder. Body hurts. Shoulders and arms are in pain. Foot is excruciating. Antcipating surgery on December 10. Mourns inability for  to be with her alongside current stress and anticipated recovery. Continues to see therapist through [facility], noting tremendous appreciation of this and anticipating next visit day before surgery.    Just had birthday, appreciating going to [employer] with friends, going to eat, and enjoying the day. \"Best birthday I've had in a long time.\" Thanksgiving was complicated, continuing to mourn complicated family dynamics, triggering of previous insults. Allowed self permission to go to friend's house in [place] vs spend time with family of origin and enjoyed this.    Sleep remains complicated. Little motivation to engage. Goes to bed, but scrolls on phone for hours. Often sleeping until 1 PM. Is taking modafinil at this time. Otherwise continued duloxetine 60 mg q evening, gabapentin 300 mg tid. Mood is low, anxiety is high. Feels stuck. Denies SI.    Exam: As above    Recent Labs Reviewed:  Lab on 11/11/2024   Component Date Value    Glucose 11/11/2024 118 (H)     BUN 11/11/2024 24 (H)     Creatinine 11/11/2024 0.96     Sodium 11/11/2024 140     Potassium 11/11/2024 4.8     Chloride 11/11/2024 101     CO2 11/11/2024 " 31.9 (H)     Calcium 11/11/2024 9.1     Total Protein 11/11/2024 6.9     Albumin 11/11/2024 4.1     ALT (SGPT) 11/11/2024 13     AST (SGOT) 11/11/2024 21     Alkaline Phosphatase 11/11/2024 68     Total Bilirubin 11/11/2024 0.5     Globulin 11/11/2024 2.8     A/G Ratio 11/11/2024 1.5     BUN/Creatinine Ratio 11/11/2024 25.0     Anion Gap 11/11/2024 7.1     eGFR 11/11/2024 66.6     WBC 11/11/2024 4.68     RBC 11/11/2024 3.71 (L)     Hemoglobin 11/11/2024 12.4     Hematocrit 11/11/2024 38.2     MCV 11/11/2024 103.0 (H)     MCH 11/11/2024 33.4 (H)     MCHC 11/11/2024 32.5     RDW 11/11/2024 17.1 (H)     RDW-SD 11/11/2024 64.5 (H)     MPV 11/11/2024 10.0     Platelets 11/11/2024 233     Neutrophil % 11/11/2024 62.9     Lymphocyte % 11/11/2024 22.6     Monocyte % 11/11/2024 11.1     Eosinophil % 11/11/2024 1.9     Basophil % 11/11/2024 1.1     Immature Grans % 11/11/2024 0.4     Neutrophils, Absolute 11/11/2024 2.94     Lymphocytes, Absolute 11/11/2024 1.06     Monocytes, Absolute 11/11/2024 0.52     Eosinophils, Absolute 11/11/2024 0.09     Basophils, Absolute 11/11/2024 0.05     Immature Grans, Absolute 11/11/2024 0.02     nRBC 11/11/2024 0.0    Labs reviewed    Current Psychotropic Medications:  Duloxetine 60 mg daily  Gabapentin 300 mg tid  Modafinil 100 mg upon awakening    Plan of Care/ Medical Decision Making  Increase duloxetine to 90 mg daily.  Continue gabapentin, helpful for pain.  Reviewed use of modafinil, likely contributing to current sleep/ wake dysregulation. Discussed sleep hygiene techniques, intentional restructuring of circadian rhythm through bedtime alarm, phone out of bed, also reviewing use of alarm and recognizing benefit of this being out of reach. Discussed ability to take 30-60 minute rest during day if tired. Acknowledged goals of wakefulness with modafinil, encouraging omitting this if unable to take before 9:30 AM as is likely contributing to further dysregulation.  Supported continued  participation in grief counseling through [facility].  FU scheduled in 2 weeks via [facility] alongside recovery from upcoming surgery.    Diagnoses and all orders for this visit:    1. Neoplastic malignant related fatigue (Primary)    2. Metastatic breast cancer    3. Grief    4. Major depressive disorder, recurrent episode, moderate    Other orders  -     DULoxetine (Cymbalta) 30 MG capsule; Take 1 capsule by mouth Daily. Take in conjunction with 60 mg q evening.  Dispense: 90 capsule; Refill: 0    I spent 36 minutes caring for Martha on this date of service. This time includes time spent by me in the following activities: preparing for the visit, reviewing tests, obtaining and/or reviewing a separately obtained history, performing a medically appropriate examination and/or evaluation, counseling and educating the patient/family/caregiver, ordering medications, tests, or procedures, and documenting information in the medical record.

## 2024-12-03 ENCOUNTER — OFFICE VISIT (OUTPATIENT)
Dept: INTERNAL MEDICINE | Facility: CLINIC | Age: 64
End: 2024-12-03
Payer: MEDICARE

## 2024-12-03 VITALS
SYSTOLIC BLOOD PRESSURE: 116 MMHG | OXYGEN SATURATION: 95 % | DIASTOLIC BLOOD PRESSURE: 80 MMHG | HEART RATE: 102 BPM | HEIGHT: 60 IN | BODY MASS INDEX: 35.53 KG/M2 | WEIGHT: 181 LBS

## 2024-12-03 DIAGNOSIS — I10 HYPERTENSION, UNSPECIFIED TYPE: ICD-10-CM

## 2024-12-03 DIAGNOSIS — Z01.810 PREOP CARDIOVASCULAR EXAM: ICD-10-CM

## 2024-12-03 DIAGNOSIS — M79.672 LEFT FOOT PAIN: Primary | ICD-10-CM

## 2024-12-03 DIAGNOSIS — G60.0 CHARCOT-MARIE-TOOTH DISEASE: ICD-10-CM

## 2024-12-03 DIAGNOSIS — R73.01 IMPAIRED FASTING GLUCOSE: ICD-10-CM

## 2024-12-03 DIAGNOSIS — C50.919 PRIMARY MALIGNANT NEOPLASM OF BREAST WITH METASTASIS: ICD-10-CM

## 2024-12-03 DIAGNOSIS — D64.9 ANEMIA, UNSPECIFIED TYPE: ICD-10-CM

## 2024-12-03 PROCEDURE — 99214 OFFICE O/P EST MOD 30 MIN: CPT | Performed by: INTERNAL MEDICINE

## 2024-12-03 PROCEDURE — 3074F SYST BP LT 130 MM HG: CPT | Performed by: INTERNAL MEDICINE

## 2024-12-03 PROCEDURE — 1126F AMNT PAIN NOTED NONE PRSNT: CPT | Performed by: INTERNAL MEDICINE

## 2024-12-03 PROCEDURE — 3079F DIAST BP 80-89 MM HG: CPT | Performed by: INTERNAL MEDICINE

## 2024-12-03 PROCEDURE — G2211 COMPLEX E/M VISIT ADD ON: HCPCS | Performed by: INTERNAL MEDICINE

## 2024-12-03 RX ORDER — OXYCODONE AND ACETAMINOPHEN 7.5; 325 MG/1; MG/1
1 TABLET ORAL EVERY 8 HOURS PRN
Qty: 90 TABLET | Refills: 0 | Status: SHIPPED | OUTPATIENT
Start: 2024-12-03

## 2024-12-03 NOTE — TELEPHONE ENCOUNTER
Called patient, no answer and left VM. Told patient She should hold Eliquis 48 hours prior to surgery and resume Eliquis soon as possible from orthopedic standpoint postoperatively . Hold the xeloda for 1 week prior to surgery and continue to hold until the patient see's Dr. Hnacock at their next appt on 1/6/2025.

## 2024-12-03 NOTE — PROGRESS NOTES
"Chief Complaint   Patient presents with    surgery clearance    Foot Pain    arm pain       History of Present Illness   Martha Davey is a 64 y.o. female presents for preop evaluation. Patient is to have foot surgery on 12/10/24.   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   Pateint notes that she has felt fatigued. Arms are painful and foot hurts with every step. She is taking tramadol for pain \"it's not helping\". She recently has tried oxycodone and this \"takes a little bit of the pain off but not much\". She gets gi upset w pain meds. Also taking alleve at times. She is on gabapentin as well.   Has mets breast cancer w mets to thoracic spine     The following portions of the patient's history were reviewed and updated as appropriate: allergies, current medications, past family history, past medical history, past social history, past surgical history and problem list.  Current Outpatient Medications on File Prior to Visit   Medication Sig Dispense Refill    apixaban (Eliquis) 5 MG tablet tablet Take 1 tablet by mouth Every 12 (Twelve) Hours. 180 tablet 3    azelastine (ASTELIN) 0.1 % nasal spray 2 sprays into the nostril(s) as directed by provider 2 (Two) Times a Day. Use in each nostril as directed 30 mL 12    calcium carbonate (OS-CARY) 600 MG tablet Take 1 tablet by mouth 2 (Two) Times a Day With Meals.      capecitabine (XELODA) 500 MG chemo tablet TAKE 2 TABLETS (1000MG) BY MOUTH EVERY MORNING AND 3 TABLETS (1500MG) BY MOUTH EVERY EVENING FOR 7 DAYS. TAKE 7 DAYS OFF, THEN REPEAT CYCLE. 70 tablet 5    cholecalciferol (VITAMIN D3) 1000 units tablet Take 1 tablet by mouth Daily.      CRANBERRY PO Take  by mouth.      Cyanocobalamin ER 1000 MCG tablet controlled-release Take 1 tablet by mouth Daily.      Diclofenac Sodium (VOLTAREN) 1 % gel gel Place 4 g on the skin as directed by provider.      DULoxetine (Cymbalta) 30 MG capsule Take 1 capsule by mouth Daily. Take in conjunction with 60 mg " q evening. 90 capsule 0    DULoxetine (Cymbalta) 60 MG capsule Take 1 capsule by mouth Daily. 90 capsule 3    FLONASE ALLERGY RELIEF 50 MCG/ACT nasal spray Administer 2 sprays into the nostril(s) as directed by provider Daily.  11    gabapentin (NEURONTIN) 300 MG capsule Take 1 capsule by mouth 3 (Three) Times a Day. 270 capsule 1    losartan (Cozaar) 25 MG tablet Take 1 tablet by mouth Daily. 90 tablet 3    mesalamine (LIALDA) 1.2 g EC tablet TAKE 1 TABLET BY MOUTH TWICE  DAILY 180 tablet 3    metaxalone (Skelaxin) 800 MG tablet Take 1 tablet by mouth 3 (Three) Times a Day As Needed for Muscle Spasms. 30 tablet 0    methenamine (HIPREX) 1 g tablet Take 1 tablet by mouth Daily.      modafinil (PROVIGIL) 200 MG tablet Take 1 tablet by mouth Daily. 30 tablet 2    Multiple Vitamins-Minerals (Multivitamin Gummies Womens) chewable tablet Chew 1 tablet Daily.      ondansetron ODT (ZOFRAN-ODT) 8 MG disintegrating tablet Place 1 tablet on the tongue Every 8 (Eight) Hours As Needed for Nausea or Vomiting. 30 tablet 1    pantoprazole (PROTONIX) 40 MG EC tablet TAKE 1 TABLET BY MOUTH DAILY 90 tablet 3    Probiotic Product (PROBIOTIC-10 PO) Take  by mouth.      rosuvastatin (CRESTOR) 5 MG tablet TAKE 1 TABLET BY MOUTH DAILY 90 tablet 3    saccharomyces boulardii (Florastor) 250 MG capsule Take 1 capsule by mouth 2 (Two) Times a Day. 20 capsule 0    triamcinolone (KENALOG) 0.1 % cream Apply  topically to the appropriate area as directed 2 (Two) Times a Day. 15 g 0    urea (CARMOL) 10 % cream Apply  topically to the appropriate area as directed 3 (Three) Times a Day. 453 g 1    Vibegron (Gemtesa) 75 MG tablet Take 1 tablet by mouth Daily. 30 tablet 5    White Petrolatum-Mineral Oil (Wh Petrol-Mineral Oil-Lanolin) 0.1-0.1 % ointment Apply  to eye(s) as directed by provider.      [DISCONTINUED] oxyCODONE-acetaminophen (PERCOCET) 5-325 MG per tablet Take 1 tablet by mouth Every 4 (Four) Hours As Needed for Moderate Pain. 60 tablet 0     [DISCONTINUED] traMADol (ULTRAM) 50 MG tablet TAKE ONE TABLET BY MOUTH EVERY 8 HOURS AS NEEDED FOR MODERATE PAIN 90 tablet 0    cephalexin (Keflex) 500 MG capsule Take 1 capsule by mouth 2 (Two) Times a Day. (Patient not taking: Reported on 12/3/2024) 14 capsule 0    ciprofloxacin (CIPRO) 500 MG tablet Take 1 tablet by mouth 2 (Two) Times a Day. (Patient not taking: Reported on 12/3/2024) 10 tablet 0    Hylan (SYNVISC) 16 MG/2ML solution prefilled syringe injection Inject 2 mL into the appropriate joint as directed by provider As Needed. (Patient not taking: Reported on 12/3/2024)      mupirocin (BACTROBAN) 2 % ointment Apply 1 Application topically to the appropriate area as directed As Needed.      phenazopyridine (Pyridium) 200 MG tablet Take 1 tablet by mouth 3 (Three) Times a Day As Needed for Bladder Spasms. (Patient not taking: Reported on 12/3/2024) 15 tablet 3     No current facility-administered medications on file prior to visit.     Review of Systems   Constitutional: Negative.    HENT: Negative.     Eyes: Negative.    Respiratory: Negative.     Cardiovascular: Negative.    Gastrointestinal: Negative.    Endocrine: Negative.    Genitourinary: Negative.    Musculoskeletal:  Positive for arthralgias, back pain and gait problem.        Foot pain     Skin: Negative.    Allergic/Immunologic: Negative.    Hematological: Negative.    Psychiatric/Behavioral: Negative.         Objective   Physical Exam  Vitals and nursing note reviewed.   Constitutional:       Appearance: Normal appearance. She is well-developed.   HENT:      Head: Normocephalic and atraumatic.      Right Ear: Tympanic membrane and external ear normal.      Left Ear: Tympanic membrane and external ear normal.      Nose: Nose normal.      Mouth/Throat:      Mouth: Mucous membranes are moist.   Eyes:      Extraocular Movements: Extraocular movements intact.      Pupils: Pupils are equal, round, and reactive to light.   Cardiovascular:      Rate  "and Rhythm: Normal rate and regular rhythm.      Pulses: Normal pulses.      Heart sounds: Normal heart sounds.   Pulmonary:      Effort: Pulmonary effort is normal. No respiratory distress.      Breath sounds: Normal breath sounds.   Abdominal:      General: Abdomen is flat.      Palpations: Abdomen is soft.   Musculoskeletal:         General: Normal range of motion.      Cervical back: Normal range of motion and neck supple.   Skin:     General: Skin is warm and dry.   Neurological:      General: No focal deficit present.      Mental Status: She is alert and oriented to person, place, and time.   Psychiatric:         Mood and Affect: Mood normal.         Behavior: Behavior normal.         Thought Content: Thought content normal.         Judgment: Judgment normal.      EKG for preop eval h/o htn hyperlip. Sinustachycardia 101 bpm. Tracing change from 2019 . Ref to cardiology.       /80   Pulse 102   Ht 152.4 cm (60\")   Wt 82.1 kg (181 lb)   LMP  (LMP Unknown)   SpO2 95%   BMI 35.35 kg/m²     Assessment & Plan   Diagnoses and all orders for this visit:    Left foot pain  -     Ambulatory Referral to Cardiology    Charcot-Saloni-Tooth disease  -     Ambulatory Referral to Neurology    Hypertension, unspecified type  -     CBC & Differential  -     Folate  -     Vitamin B12  -     Iron Profile  -     Basic Metabolic Panel  -     Hemoglobin A1c  -     TSH    Anemia, unspecified type  -     CBC & Differential  -     Folate  -     Vitamin B12  -     Iron Profile  -     Basic Metabolic Panel  -     Hemoglobin A1c  -     TSH    Metastatic breast cancer  -     CBC & Differential  -     Folate  -     Vitamin B12  -     Iron Profile  -     Basic Metabolic Panel  -     Hemoglobin A1c  -     TSH    Impaired fasting glucose  -     Basic Metabolic Panel  -     Hemoglobin A1c    Preop cardiovascular exam  -     Ambulatory Referral to Cardiology    Other orders  -     oxyCODONE-acetaminophen (Percocet) 7.5-325 MG per " tablet; Take 1 tablet by mouth Every 8 (Eight) Hours As Needed for Moderate Pain.      Yohana presents for peroperative evaluation. She has low function related to foot pain as well as pain from spinal mets. She has EKG tracing changes and is referred to cardiology for further preoperative evaluation. H/o anemia and will evaluate blood counts, thyroid function, and glucose levels prior to surgery as well. She is in a fair amount of pain and rxd oxycodone for this. She is aware of risks and benefits of med and did review pain med policy. She has weakness related to chronic charcot ambika tooth disease as well and will refer to neurology for future evaluation of this.

## 2024-12-04 ENCOUNTER — OFFICE VISIT (OUTPATIENT)
Dept: CARDIOLOGY | Facility: CLINIC | Age: 64
End: 2024-12-04
Payer: MEDICARE

## 2024-12-04 ENCOUNTER — TELEPHONE (OUTPATIENT)
Dept: INTERNAL MEDICINE | Facility: CLINIC | Age: 64
End: 2024-12-04
Payer: MEDICARE

## 2024-12-04 VITALS
WEIGHT: 182 LBS | BODY MASS INDEX: 35.73 KG/M2 | SYSTOLIC BLOOD PRESSURE: 110 MMHG | OXYGEN SATURATION: 98 % | DIASTOLIC BLOOD PRESSURE: 84 MMHG | HEART RATE: 102 BPM | HEIGHT: 60 IN

## 2024-12-04 DIAGNOSIS — Z01.810 PREOP CARDIOVASCULAR EXAM: Primary | ICD-10-CM

## 2024-12-04 DIAGNOSIS — E78.5 HYPERLIPIDEMIA, UNSPECIFIED HYPERLIPIDEMIA TYPE: ICD-10-CM

## 2024-12-04 DIAGNOSIS — Z86.711 HISTORY OF PULMONARY EMBOLISM: ICD-10-CM

## 2024-12-04 DIAGNOSIS — I10 HYPERTENSION, UNSPECIFIED TYPE: ICD-10-CM

## 2024-12-04 PROBLEM — I26.99 ACUTE PULMONARY EMBOLISM: Status: RESOLVED | Noted: 2017-10-21 | Resolved: 2024-12-04

## 2024-12-04 LAB
BASOPHILS # BLD AUTO: 0.1 X10E3/UL (ref 0–0.2)
BASOPHILS NFR BLD AUTO: 1 %
BUN SERPL-MCNC: 23 MG/DL (ref 8–27)
BUN/CREAT SERPL: 24 (ref 12–28)
CALCIUM SERPL-MCNC: 9.2 MG/DL (ref 8.7–10.3)
CHLORIDE SERPL-SCNC: 103 MMOL/L (ref 96–106)
CO2 SERPL-SCNC: 25 MMOL/L (ref 20–29)
CREAT SERPL-MCNC: 0.94 MG/DL (ref 0.57–1)
EGFRCR SERPLBLD CKD-EPI 2021: 68 ML/MIN/1.73
EOSINOPHIL # BLD AUTO: 0.2 X10E3/UL (ref 0–0.4)
EOSINOPHIL NFR BLD AUTO: 2 %
ERYTHROCYTE [DISTWIDTH] IN BLOOD BY AUTOMATED COUNT: 16.3 % (ref 11.7–15.4)
FOLATE SERPL-MCNC: 7.5 NG/ML
GLUCOSE SERPL-MCNC: 109 MG/DL (ref 70–99)
HBA1C MFR BLD: 5.8 % (ref 4.8–5.6)
HCT VFR BLD AUTO: 37.1 % (ref 34–46.6)
HGB BLD-MCNC: 12.1 G/DL (ref 11.1–15.9)
IMM GRANULOCYTES # BLD AUTO: 0 X10E3/UL (ref 0–0.1)
IMM GRANULOCYTES NFR BLD AUTO: 0 %
IRON SATN MFR SERPL: 8 % (ref 15–55)
IRON SERPL-MCNC: 28 UG/DL (ref 27–139)
LYMPHOCYTES # BLD AUTO: 1.1 X10E3/UL (ref 0.7–3.1)
LYMPHOCYTES NFR BLD AUTO: 9 %
MCH RBC QN AUTO: 32.6 PG (ref 26.6–33)
MCHC RBC AUTO-ENTMCNC: 32.6 G/DL (ref 31.5–35.7)
MCV RBC AUTO: 100 FL (ref 79–97)
MONOCYTES # BLD AUTO: 0.5 X10E3/UL (ref 0.1–0.9)
MONOCYTES NFR BLD AUTO: 4 %
NEUTROPHILS # BLD AUTO: 10 X10E3/UL (ref 1.4–7)
NEUTROPHILS NFR BLD AUTO: 84 %
PLATELET # BLD AUTO: 308 X10E3/UL (ref 150–450)
POTASSIUM SERPL-SCNC: 4.7 MMOL/L (ref 3.5–5.2)
RBC # BLD AUTO: 3.71 X10E6/UL (ref 3.77–5.28)
SODIUM SERPL-SCNC: 143 MMOL/L (ref 134–144)
TIBC SERPL-MCNC: 350 UG/DL (ref 250–450)
TSH SERPL DL<=0.005 MIU/L-ACNC: 1.49 UIU/ML (ref 0.45–4.5)
UIBC SERPL-MCNC: 322 UG/DL (ref 118–369)
VIT B12 SERPL-MCNC: >2000 PG/ML (ref 232–1245)
WBC # BLD AUTO: 12 X10E3/UL (ref 3.4–10.8)

## 2024-12-04 PROCEDURE — 3079F DIAST BP 80-89 MM HG: CPT | Performed by: STUDENT IN AN ORGANIZED HEALTH CARE EDUCATION/TRAINING PROGRAM

## 2024-12-04 PROCEDURE — 1160F RVW MEDS BY RX/DR IN RCRD: CPT | Performed by: STUDENT IN AN ORGANIZED HEALTH CARE EDUCATION/TRAINING PROGRAM

## 2024-12-04 PROCEDURE — 93000 ELECTROCARDIOGRAM COMPLETE: CPT | Performed by: STUDENT IN AN ORGANIZED HEALTH CARE EDUCATION/TRAINING PROGRAM

## 2024-12-04 PROCEDURE — 99204 OFFICE O/P NEW MOD 45 MIN: CPT | Performed by: STUDENT IN AN ORGANIZED HEALTH CARE EDUCATION/TRAINING PROGRAM

## 2024-12-04 PROCEDURE — 1159F MED LIST DOCD IN RCRD: CPT | Performed by: STUDENT IN AN ORGANIZED HEALTH CARE EDUCATION/TRAINING PROGRAM

## 2024-12-04 PROCEDURE — 3074F SYST BP LT 130 MM HG: CPT | Performed by: STUDENT IN AN ORGANIZED HEALTH CARE EDUCATION/TRAINING PROGRAM

## 2024-12-04 NOTE — TELEPHONE ENCOUNTER
Patient has been scheduled with Gelacio Chilel with [facility] on 12/04/2024 at 9:30. Their office called the patient and schedule an appointment.    Thanks,    Olive    ----- Message from Jazmine Chanel sent at 12/3/2024  5:36 PM EST -----  Patient has surgery scheduled for 12/10. She had abnormalities on EKG and needs o/v w cardiology ASAP  Thank you  jw

## 2024-12-04 NOTE — PROGRESS NOTES
"        [facility]      Patient Name: Martha Davey  :1960  Age: 64 y.o.  Encounter Provider:  Gelacio Chilel MD      Chief Complaint:   Chief Complaint   Patient presents with    Left foot pain    Preop Cardiovascular         HPI  Martha Davey is a 64 y.o. female who presents today for preoperative risk assessment. Pt has a  history significant for PE, hypertension, hyperlipidemia, breast cancer with mets to thoracic spine.  She has Charcot foot and is planned to undergo surgery 12/10/2024.  Because of her foot spine she is not very active.  Unable to assess she can do 4 METS.  She denies a history of cardiovascular disease.  Denies chest pain or shortness of breath.  No lower extremity edema orthopnea.  No palpitations      The following portions of the patient's history were reviewed and updated as appropriate: allergies, current medications, past family history, past medical history, past social history, past surgical history and problem list.      Previous Cardiac Testing:  TTE 2018: Normal biventricular size and function.  No pericardial effusion    No significant coronary artery calcifications on CT chest  2024.     OBJECTIVE:   Vital Signs  Vitals:    24 0943   BP: 110/84   Pulse: 102   SpO2: 98%     Estimated body mass index is 35.54 kg/m² as calculated from the following:    Height as of this encounter: 152.4 cm (60\").    Weight as of this encounter: 82.6 kg (182 lb).    Constitutional:       Appearance: Healthy appearance. Not in distress.   Neck:      Vascular: JVD normal.   Pulmonary:      Effort: Pulmonary effort is normal.      Breath sounds: Normal breath sounds. No wheezing. No rhonchi. No rales.   Cardiovascular:      PMI at left midclavicular line. Normal rate. Regular rhythm. Normal S1. Normal S2.       Murmurs: There is a grade 2/6 systolic murmur at the LLSB and ULSB.      No gallop.  No click. No rub.   Pulses:     Intact distal pulses.   Edema:     " Peripheral edema absent.   Abdominal:      Palpations: Abdomen is soft.      Tenderness: There is no abdominal tenderness.   Musculoskeletal: Normal range of motion. Skin:     General: Skin is warm and dry.   Neurological:      General: No focal deficit present.      Mental Status: Alert and oriented to person, place and time.           ECG 12 Lead    Date/Time: 12/4/2024 10:28 AM  Performed by: Gelacio Chilel MD    Authorized by: Gelacio Chilel MD  Rhythm: sinus rhythm  Rate: normal  Conduction: right bundle branch block  Q wave noted on lead: Q waves inferiorly and anterolaterally.    QRS axis: normal    Clinical impression: abnormal EKG          Lipid Panel          12/14/2023    10:09 9/9/2024    10:26   Lipid Panel   Total Cholesterol 161  171    Triglycerides 133  103    HDL Cholesterol 60  83    VLDL Cholesterol 23  18    LDL Cholesterol  78  70         BUN   Date Value Ref Range Status   12/03/2024 23 8 - 27 mg/dL Final   11/11/2024 24 (H) 8 - 23 mg/dL Final     Creatinine   Date Value Ref Range Status   12/03/2024 0.94 0.57 - 1.00 mg/dL Final   11/11/2024 0.96 0.57 - 1.00 mg/dL Final   06/21/2024 1.00 0.60 - 1.30 mg/dL Final     Comment:     Serial Number: [id number]:  [id number]     Potassium   Date Value Ref Range Status   12/03/2024 4.7 3.5 - 5.2 mmol/L Final   11/11/2024 4.8 3.5 - 5.2 mmol/L Final     ALT (SGPT)   Date Value Ref Range Status   11/11/2024 13 1 - 33 U/L Final     AST (SGOT)   Date Value Ref Range Status   11/11/2024 21 1 - 32 U/L Final           ASSESSMENT:      Diagnosis Plan   1. Preop cardiovascular exam  Stress Test With Pet Myocardial Perfusion    Adult Transthoracic Echo Complete W/ Cont if Necessary Per Protocol      2. Hyperlipidemia, unspecified hyperlipidemia type  Stress Test With Pet Myocardial Perfusion    Adult Transthoracic Echo Complete W/ Cont if Necessary Per Protocol      3. Hypertension, unspecified type  Stress Test With Pet Myocardial Perfusion    Adult  Transthoracic Echo Complete W/ Cont if Necessary Per Protocol      4. History of pulmonary embolism  Stress Test With Pet Myocardial Perfusion    Adult Transthoracic Echo Complete W/ Cont if Necessary Per Protocol            PLAN OF CARE:     Preoperative cardiovascular risk assessment  She has no cardiovascular symptoms was unable to perform 4 metabolic equivalents to assess.  Her EKG does show right bundle branch block and Q waves inferolaterally.  She is mildly tachycardic.  Given her history of PTE I will further assess with an echocardiogram to evaluate her right ventricular function and a stress PET (given weight and immobility).  If both are normal I think she is relatively low risk for proceeding with scheduled operation.  Test are scheduled for this Friday and surgery for next week..  It is okay to hold anticoagulation for 2 days prior and proceed  History of PE: On Eliquis  Diagnosed 2017, has been on anticoagulationsince. Ok to hold for 2 days prior to procedure and  resume when able from a surgical standpoint  Hypertension: On losartan,  Hyperlipidemia: On Crestor with an LDL of 70      Return to clinic as needed or sooner if needed pending above testing    Addendum: Echocardiogram with normal ejection fraction.  Aortic irritation has progressed from mild to moderate.  Stress test was normal.  No further cardiac workup indicated prior to procedure.  She is low risk from a cardiovascular standpoint and may proceed.  Will schedule follow-up in 1 year      Discharge Medications            Accurate as of December 4, 2024 10:29 AM. If you have any questions, ask your nurse or doctor.                Continue These Medications        Instructions Start Date   apixaban 5 MG tablet tablet  Commonly known as: Eliquis   5 mg, Oral, Every 12 Hours      azelastine 0.1 % nasal spray  Commonly known as: ASTELIN   2 sprays, Nasal, 2 Times Daily, Use in each nostril as directed      calcium carbonate 600 MG  tablet  Commonly known as: OS-CARY   600 mg, 2 Times Daily With Meals      capecitabine 500 MG chemo tablet  Commonly known as: XELODA   TAKE 2 TABLETS (1000MG) BY MOUTH EVERY MORNING AND 3 TABLETS (1500MG) BY MOUTH EVERY EVENING FOR 7 DAYS. TAKE 7 DAYS OFF, THEN REPEAT CYCLE.      cephalexin 500 MG capsule  Commonly known as: Keflex   500 mg, Oral, 2 Times Daily      cholecalciferol 25 MCG (1000 UT) tablet  Commonly known as: VITAMIN D3   1,000 Units, Daily      ciprofloxacin 500 MG tablet  Commonly known as: CIPRO   500 mg, Oral, 2 Times Daily      CRANBERRY PO   Take  by mouth.      Cyanocobalamin ER 1000 MCG tablet controlled-release   1 tablet, Daily      Diclofenac Sodium 1 % gel gel  Commonly known as: VOLTAREN   4 g      DULoxetine 60 MG capsule  Commonly known as: Cymbalta   60 mg, Oral, Daily      DULoxetine 30 MG capsule  Commonly known as: Cymbalta   30 mg, Oral, Daily, Take in conjunction with 60 mg q evening.      Flonase Allergy Relief 50 MCG/ACT nasal spray  Generic drug: fluticasone   2 sprays, Daily      gabapentin 300 MG capsule  Commonly known as: NEURONTIN   300 mg, Oral, 3 Times Daily      Gemtesa 75 MG tablet  Generic drug: Vibegron   75 mg, Oral, Daily      Hylan G-F 20 16 MG/2ML solution prefilled syringe injection  Commonly known as: SYNVISC   16 mg, As Needed      losartan 25 MG tablet  Commonly known as: Cozaar   25 mg, Oral, Daily      mesalamine 1.2 g EC tablet  Commonly known as: LIALDA   1.2 g, Oral, Every 12 Hours Scheduled      metaxalone 800 MG tablet  Commonly known as: Skelaxin   800 mg, Oral, 3 Times Daily PRN      methenamine 1 g tablet  Commonly known as: HIPREX   1 g, Daily      modafinil 200 MG tablet  Commonly known as: PROVIGIL   200 mg, Oral, Daily      Multivitamin Gummies Womens chewable tablet   1 tablet, Daily      mupirocin 2 % ointment  Commonly known as: BACTROBAN   As Needed      ondansetron ODT 8 MG disintegrating tablet  Commonly known as: ZOFRAN-ODT   8 mg,  Translingual, Every 8 Hours PRN      oxyCODONE-acetaminophen 7.5-325 MG per tablet  Commonly known as: Percocet   1 tablet, Oral, Every 8 Hours PRN      pantoprazole 40 MG EC tablet  Commonly known as: PROTONIX   40 mg, Oral, Daily      phenazopyridine 200 MG tablet  Commonly known as: Pyridium   200 mg, Oral, 3 Times Daily PRN      PROBIOTIC-10 PO   Take  by mouth.      rosuvastatin 5 MG tablet  Commonly known as: CRESTOR   5 mg, Oral, Daily      saccharomyces boulardii 250 MG capsule  Commonly known as: Florastor   250 mg, Oral, 2 Times Daily      triamcinolone 0.1 % cream  Commonly known as: KENALOG   Topical, 2 Times Daily      urea 10 % cream  Commonly known as: CARMOL   Topical, 3 Times Daily      Wh Petrol-Mineral Oil-Lanolin 0.1-0.1 % ointment   Apply  to eye(s) as directed by provider.               Thank you for allowing me to participate in the care of your patient,      Sincerely,   Gelacio Chilel MD  [facility]  12/04/24  10:29 EST

## 2024-12-05 ENCOUNTER — TELEPHONE (OUTPATIENT)
Dept: CARDIOLOGY | Facility: CLINIC | Age: 64
End: 2024-12-05
Payer: MEDICARE

## 2024-12-06 ENCOUNTER — HOSPITAL ENCOUNTER (OUTPATIENT)
Dept: CARDIOLOGY | Facility: HOSPITAL | Age: 64
Discharge: HOME | End: 2024-12-06
Payer: MEDICARE

## 2024-12-06 ENCOUNTER — TELEPHONE (OUTPATIENT)
Dept: INTERNAL MEDICINE | Facility: CLINIC | Age: 64
End: 2024-12-06
Payer: MEDICARE

## 2024-12-06 VITALS
WEIGHT: 182 LBS | HEART RATE: 88 BPM | HEIGHT: 60 IN | SYSTOLIC BLOOD PRESSURE: 150 MMHG | BODY MASS INDEX: 35.73 KG/M2 | DIASTOLIC BLOOD PRESSURE: 98 MMHG

## 2024-12-06 DIAGNOSIS — Z01.810 PREOP CARDIOVASCULAR EXAM: ICD-10-CM

## 2024-12-06 DIAGNOSIS — I10 HYPERTENSION, UNSPECIFIED TYPE: ICD-10-CM

## 2024-12-06 DIAGNOSIS — E78.5 HYPERLIPIDEMIA, UNSPECIFIED HYPERLIPIDEMIA TYPE: ICD-10-CM

## 2024-12-06 DIAGNOSIS — Z86.711 HISTORY OF PULMONARY EMBOLISM: ICD-10-CM

## 2024-12-06 DIAGNOSIS — R79.89 ABNORMAL CBC: Primary | ICD-10-CM

## 2024-12-06 LAB
AORTIC ARCH: 3 CM
ASCENDING AORTA: 3.5 CM
BH CV ECHO MEAS - ACS: 1.99 CM
BH CV ECHO MEAS - AI P1/2T: 823.1 MSEC
BH CV ECHO MEAS - AO MAX PG: 10.5 MMHG
BH CV ECHO MEAS - AO MEAN PG: 6 MMHG
BH CV ECHO MEAS - AO ROOT DIAM: 3.2 CM
BH CV ECHO MEAS - AO V2 MAX: 162 CM/SEC
BH CV ECHO MEAS - AO V2 VTI: 29.7 CM
BH CV ECHO MEAS - AVA(I,D): 1.47 CM2
BH CV ECHO MEAS - EDV(CUBED): 59.3 ML
BH CV ECHO MEAS - EDV(MOD-SP2): 63 ML
BH CV ECHO MEAS - EDV(MOD-SP4): 60 ML
BH CV ECHO MEAS - EF(MOD-BP): 64.2 %
BH CV ECHO MEAS - EF(MOD-SP2): 63.5 %
BH CV ECHO MEAS - EF(MOD-SP4): 61.7 %
BH CV ECHO MEAS - ESV(CUBED): 14.5 ML
BH CV ECHO MEAS - ESV(MOD-SP2): 23 ML
BH CV ECHO MEAS - ESV(MOD-SP4): 23 ML
BH CV ECHO MEAS - FS: 37.4 %
BH CV ECHO MEAS - IVS/LVPW: 0.9 CM
BH CV ECHO MEAS - IVSD: 0.9 CM
BH CV ECHO MEAS - LAT PEAK E' VEL: 4.7 CM/SEC
BH CV ECHO MEAS - LV DIASTOLIC VOL/BSA (35-75): 33.5 CM2
BH CV ECHO MEAS - LV MASS(C)D: 113.6 GRAMS
BH CV ECHO MEAS - LV MAX PG: 7 MMHG
BH CV ECHO MEAS - LV MEAN PG: 4 MMHG
BH CV ECHO MEAS - LV SYSTOLIC VOL/BSA (12-30): 12.8 CM2
BH CV ECHO MEAS - LV V1 MAX: 132 CM/SEC
BH CV ECHO MEAS - LV V1 VTI: 23.1 CM
BH CV ECHO MEAS - LVIDD: 3.9 CM
BH CV ECHO MEAS - LVIDS: 2.44 CM
BH CV ECHO MEAS - LVOT AREA: 1.89 CM2
BH CV ECHO MEAS - LVOT DIAM: 1.55 CM
BH CV ECHO MEAS - LVPWD: 1 CM
BH CV ECHO MEAS - MED PEAK E' VEL: 5.4 CM/SEC
BH CV ECHO MEAS - MR MAX PG: 80.3 MMHG
BH CV ECHO MEAS - MR MAX VEL: 447.9 CM/SEC
BH CV ECHO MEAS - MV A DUR: 0.11 SEC
BH CV ECHO MEAS - MV A MAX VEL: 102 CM/SEC
BH CV ECHO MEAS - MV DEC SLOPE: 264.9 CM/SEC2
BH CV ECHO MEAS - MV DEC TIME: 0.15 SEC
BH CV ECHO MEAS - MV E MAX VEL: 90.9 CM/SEC
BH CV ECHO MEAS - MV E/A: 0.89
BH CV ECHO MEAS - MV MAX PG: 5.7 MMHG
BH CV ECHO MEAS - MV MEAN PG: 1.99 MMHG
BH CV ECHO MEAS - MV P1/2T: 97.5 MSEC
BH CV ECHO MEAS - MV V2 VTI: 23.7 CM
BH CV ECHO MEAS - MVA(P1/2T): 2.26 CM2
BH CV ECHO MEAS - MVA(VTI): 1.83 CM2
BH CV ECHO MEAS - PA ACC TIME: 0.1 SEC
BH CV ECHO MEAS - PA V2 MAX: 93.7 CM/SEC
BH CV ECHO MEAS - PULM A REVS DUR: 0.09 SEC
BH CV ECHO MEAS - PULM A REVS VEL: 27.4 CM/SEC
BH CV ECHO MEAS - PULM DIAS VEL: 41.9 CM/SEC
BH CV ECHO MEAS - PULM S/D: 1.2
BH CV ECHO MEAS - PULM SYS VEL: 50.3 CM/SEC
BH CV ECHO MEAS - QP/QS: 0.87
BH CV ECHO MEAS - RAP SYSTOLE: 3 MMHG
BH CV ECHO MEAS - RV MAX PG: 1.74 MMHG
BH CV ECHO MEAS - RV V1 MAX: 66 CM/SEC
BH CV ECHO MEAS - RV V1 VTI: 12.1 CM
BH CV ECHO MEAS - RVOT DIAM: 2 CM
BH CV ECHO MEAS - RVSP: 24.8 MMHG
BH CV ECHO MEAS - SUP REN AO DIAM: 2 CM
BH CV ECHO MEAS - SV(LVOT): 43.5 ML
BH CV ECHO MEAS - SV(MOD-SP2): 40 ML
BH CV ECHO MEAS - SV(MOD-SP4): 37 ML
BH CV ECHO MEAS - SV(RVOT): 37.8 ML
BH CV ECHO MEAS - SVI(LVOT): 24.3 ML/M2
BH CV ECHO MEAS - SVI(MOD-SP2): 22.3 ML/M2
BH CV ECHO MEAS - SVI(MOD-SP4): 20.6 ML/M2
BH CV ECHO MEAS - TAPSE (>1.6): 2.04 CM
BH CV ECHO MEAS - TR MAX PG: 21.8 MMHG
BH CV ECHO MEAS - TR MAX VEL: 233.5 CM/SEC
BH CV ECHO MEASUREMENTS AVERAGE E/E' RATIO: 18
BH CV XLRA - RV BASE: 2.8 CM
BH CV XLRA - RV LENGTH: 7.1 CM
BH CV XLRA - RV MID: 2.7 CM
BH CV XLRA - TDI S': 17.6 CM/SEC
LEFT ATRIUM VOLUME INDEX: 19.7 ML/M2
SINUS: 2.9 CM
STJ: 3.1 CM

## 2024-12-06 PROCEDURE — 93306 TTE W/DOPPLER COMPLETE: CPT

## 2024-12-06 NOTE — TELEPHONE ENCOUNTER
----- Message from Jazmine Chanel sent at 12/6/2024  4:52 PM EST -----  Patient has an elevated white blood cell count. Has she had any fevers? Any recent steroid tablets or injection? Can repeat levels on Monday    jw

## 2024-12-09 ENCOUNTER — HOSPITAL ENCOUNTER (OUTPATIENT)
Dept: CARDIOLOGY | Facility: HOSPITAL | Age: 64
Discharge: HOME | End: 2024-12-09
Admitting: STUDENT IN AN ORGANIZED HEALTH CARE EDUCATION/TRAINING PROGRAM
Payer: MEDICARE

## 2024-12-09 ENCOUNTER — TELEPHONE (OUTPATIENT)
Dept: CARDIOLOGY | Facility: CLINIC | Age: 64
End: 2024-12-09
Payer: MEDICARE

## 2024-12-09 ENCOUNTER — TELEPHONE (OUTPATIENT)
Dept: INTERNAL MEDICINE | Facility: CLINIC | Age: 64
End: 2024-12-09
Payer: MEDICARE

## 2024-12-09 VITALS — WEIGHT: 182.1 LBS | HEIGHT: 60 IN | BODY MASS INDEX: 35.75 KG/M2

## 2024-12-09 DIAGNOSIS — R31.9 HEMATURIA, UNSPECIFIED TYPE: Primary | ICD-10-CM

## 2024-12-09 LAB
BH CV NUCLEAR PRIOR STUDY: 2
BH CV REST NUCLEAR ISOTOPE DOSE: 26.1 MCI
BH CV STRESS BP STAGE 1: NORMAL
BH CV STRESS DURATION MIN STAGE 1: 3
BH CV STRESS DURATION SEC STAGE 1: 0
BH CV STRESS GRADE STAGE 1: 10
BH CV STRESS HR STAGE 1: 101
BH CV STRESS METS STAGE 1: 5
BH CV STRESS NUCLEAR ISOTOPE DOSE: 26.1 MCI
BH CV STRESS PROTOCOL 1: NORMAL
BH CV STRESS RECOVERY BP: NORMAL MMHG
BH CV STRESS RECOVERY HR: 98 BPM
BH CV STRESS SPEED STAGE 1: 1.7
BH CV STRESS STAGE 1: 1
LV EF NUC BP: 70 %
MAXIMAL PREDICTED HEART RATE: 156 BPM
PERCENT MAX PREDICTED HR: 64.74 %
STRESS BASELINE BP: NORMAL MMHG
STRESS BASELINE HR: 86 BPM
STRESS PERCENT HR: 76 %
STRESS POST EXERCISE DUR SEC: 10 SEC
STRESS POST PEAK BP: NORMAL MMHG
STRESS POST PEAK HR: 101 BPM
STRESS TARGET HR: 133 BPM

## 2024-12-09 PROCEDURE — 25010000002 AMINOPHYLLINE PER 250 MG: Performed by: STUDENT IN AN ORGANIZED HEALTH CARE EDUCATION/TRAINING PROGRAM

## 2024-12-09 PROCEDURE — 25010000002 REGADENOSON 0.4 MG/5ML SOLUTION: Performed by: STUDENT IN AN ORGANIZED HEALTH CARE EDUCATION/TRAINING PROGRAM

## 2024-12-09 PROCEDURE — 78492 MYOCRD IMG PET MLT RST&STRS: CPT

## 2024-12-09 PROCEDURE — A9555 RB82 RUBIDIUM: HCPCS | Performed by: STUDENT IN AN ORGANIZED HEALTH CARE EDUCATION/TRAINING PROGRAM

## 2024-12-09 PROCEDURE — 93017 CV STRESS TEST TRACING ONLY: CPT

## 2024-12-09 PROCEDURE — 34310000005 RUBIDIUM CHLORIDE: Performed by: STUDENT IN AN ORGANIZED HEALTH CARE EDUCATION/TRAINING PROGRAM

## 2024-12-09 RX ORDER — REGADENOSON 0.08 MG/ML
0.4 INJECTION, SOLUTION INTRAVENOUS
Status: COMPLETED | OUTPATIENT
Start: 2024-12-09 | End: 2024-12-09

## 2024-12-09 RX ORDER — AMINOPHYLLINE 25 MG/ML
125 INJECTION, SOLUTION INTRAVENOUS ONCE
Status: COMPLETED | OUTPATIENT
Start: 2024-12-09 | End: 2024-12-09

## 2024-12-09 RX ADMIN — REGADENOSON 0.4 MG: 0.08 INJECTION, SOLUTION INTRAVENOUS at 09:06

## 2024-12-09 RX ADMIN — AMINOPHYLLINE 125 MG: 25 INJECTION, SOLUTION INTRAVENOUS at 09:36

## 2024-12-09 NOTE — TELEPHONE ENCOUNTER
Pt had ST this AM.  She wants for the results, once available, as well as the clearance letter to be faxed to [facility] to April at [phone].  Pt is scheduled for surgery tomorrow and needs this information sent to April.  She also is asking for results to go to PCP.    I encouraged for pt to relay this information to whoever calls her to give test results.  She v/u.    Thank you,    Branid WEST RN  Triage [facility]  12/09/24 11:06 EST

## 2024-12-09 NOTE — TELEPHONE ENCOUNTER
Pt calling requesting to speak to Sierra in regards to her stress test- Pt states its urgent and would like a call back as soon as possible please.      Best call back number [phone]

## 2024-12-09 NOTE — TELEPHONE ENCOUNTER
I tried to call Martha Davey but there was no answer.  Left a voicemail asking patient to call back.  Will continue to try to reach pt.    HUB- if pt calls back, please transfer through to triage.    Triage- I already sent these results/clearance to April and her PCP via [employer].    Thank you,    Brandi WEST RN  Triage [facility]  12/09/24 12:47 EST

## 2024-12-10 ENCOUNTER — TELEPHONE (OUTPATIENT)
Dept: CARDIOLOGY | Facility: CLINIC | Age: 64
End: 2024-12-10

## 2024-12-10 NOTE — TELEPHONE ENCOUNTER
I spoke with Martha Davey and updated pt on results/recommendations from provider.  Pt verbalized understanding and has no further questions at this time.    Thank you,    Brandi WEST, RN  Triage [facility] [place]  12/10/24 09:02 EST

## 2024-12-26 RX ORDER — APIXABAN 5 MG/1
5 TABLET, FILM COATED ORAL
Qty: 180 TABLET | Refills: 3 | Status: SHIPPED | OUTPATIENT
Start: 2024-12-26

## 2025-01-08 ENCOUNTER — TELEPHONE (OUTPATIENT)
Dept: ONCOLOGY | Facility: CLINIC | Age: 65
End: 2025-01-08

## 2025-01-08 NOTE — TELEPHONE ENCOUNTER
Caller: Martha Davey    Relationship to patient: Self    Best call back number:   Telephone Information:   Mobile [phone]     Chief complaint:    Type of visit: LAB, SPECIALTY PHARMACY & INJECTION     Requested date: CALL TO R/S     If rescheduling, when is the original appointment: 1/6/2025    Additional notes:

## 2025-01-10 ENCOUNTER — TELEPHONE (OUTPATIENT)
Dept: ONCOLOGY | Facility: CLINIC | Age: 65
End: 2025-01-10
Payer: MEDICARE

## 2025-01-10 NOTE — TELEPHONE ENCOUNTER
Caller: Martha Davey    Relationship: Self    Best call back number: [phone]    What was the call regarding: MARTHA CALLED REGARDING HER APPOINTMENT FOR 02/17. SHE IS NEEDING TO KNOW IF SHE NEEDS TO STAY OFF OF HER XELODA UNTIL THE APPOINTMENT.

## 2025-01-10 NOTE — TELEPHONE ENCOUNTER
SPOKE WITH PT TO R/S APPT. SHE CONFIRMED APPT DATE AND TIME AND SHE IS AWARE IT IS AT [facility].

## 2025-01-14 ENCOUNTER — LAB (OUTPATIENT)
Dept: LAB | Facility: HOSPITAL | Age: 65
End: 2025-01-14
Payer: MEDICARE

## 2025-01-14 ENCOUNTER — OFFICE VISIT (OUTPATIENT)
Dept: ONCOLOGY | Facility: CLINIC | Age: 65
End: 2025-01-14
Payer: MEDICARE

## 2025-01-14 ENCOUNTER — SPECIALTY PHARMACY (OUTPATIENT)
Dept: ONCOLOGY | Facility: HOSPITAL | Age: 65
End: 2025-01-14
Payer: MEDICARE

## 2025-01-14 ENCOUNTER — INFUSION (OUTPATIENT)
Dept: ONCOLOGY | Facility: HOSPITAL | Age: 65
End: 2025-01-14
Payer: MEDICARE

## 2025-01-14 VITALS
HEART RATE: 90 BPM | OXYGEN SATURATION: 95 % | BODY MASS INDEX: 35.56 KG/M2 | SYSTOLIC BLOOD PRESSURE: 103 MMHG | DIASTOLIC BLOOD PRESSURE: 68 MMHG | HEIGHT: 60 IN | TEMPERATURE: 98.3 F

## 2025-01-14 DIAGNOSIS — R30.0 DYSURIA: ICD-10-CM

## 2025-01-14 DIAGNOSIS — C50.811 MALIGNANT NEOPLASM OF OVERLAPPING SITES OF RIGHT BREAST IN FEMALE, ESTROGEN RECEPTOR NEGATIVE: ICD-10-CM

## 2025-01-14 DIAGNOSIS — C50.919 PRIMARY MALIGNANT NEOPLASM OF BREAST WITH METASTASIS: ICD-10-CM

## 2025-01-14 DIAGNOSIS — Z79.899 HIGH RISK MEDICATION USE: ICD-10-CM

## 2025-01-14 DIAGNOSIS — Z17.1 MALIGNANT NEOPLASM OF OVERLAPPING SITES OF RIGHT BREAST IN FEMALE, ESTROGEN RECEPTOR NEGATIVE: ICD-10-CM

## 2025-01-14 DIAGNOSIS — C50.811 MALIGNANT NEOPLASM OF OVERLAPPING SITES OF RIGHT BREAST IN FEMALE, ESTROGEN RECEPTOR NEGATIVE: Primary | ICD-10-CM

## 2025-01-14 DIAGNOSIS — Z17.1 MALIGNANT NEOPLASM OF OVERLAPPING SITES OF RIGHT BREAST IN FEMALE, ESTROGEN RECEPTOR NEGATIVE: Primary | ICD-10-CM

## 2025-01-14 DIAGNOSIS — C79.51 MALIGNANT NEOPLASM METASTATIC TO BONE: Primary | ICD-10-CM

## 2025-01-14 LAB
ALBUMIN SERPL-MCNC: 3.6 G/DL (ref 3.5–5.2)
ALBUMIN/GLOB SERPL: 1.2 G/DL
ALP SERPL-CCNC: 98 U/L (ref 39–117)
ALT SERPL W P-5'-P-CCNC: 8 U/L (ref 1–33)
ANION GAP SERPL CALCULATED.3IONS-SCNC: 10.4 MMOL/L (ref 5–15)
AST SERPL-CCNC: 17 U/L (ref 1–32)
BASOPHILS # BLD AUTO: 0.04 10*3/MM3 (ref 0–0.2)
BASOPHILS NFR BLD AUTO: 0.6 % (ref 0–1.5)
BILIRUB SERPL-MCNC: 0.2 MG/DL (ref 0–1.2)
BUN SERPL-MCNC: 21 MG/DL (ref 8–23)
BUN/CREAT SERPL: 27.3 (ref 7–25)
CALCIUM SPEC-SCNC: 9.1 MG/DL (ref 8.6–10.5)
CHLORIDE SERPL-SCNC: 100 MMOL/L (ref 98–107)
CO2 SERPL-SCNC: 27.6 MMOL/L (ref 22–29)
CREAT SERPL-MCNC: 0.77 MG/DL (ref 0.57–1)
DEPRECATED RDW RBC AUTO: 57.1 FL (ref 37–54)
EGFRCR SERPLBLD CKD-EPI 2021: 86.3 ML/MIN/1.73
EOSINOPHIL # BLD AUTO: 0.19 10*3/MM3 (ref 0–0.4)
EOSINOPHIL NFR BLD AUTO: 2.8 % (ref 0.3–6.2)
ERYTHROCYTE [DISTWIDTH] IN BLOOD BY AUTOMATED COUNT: 15.9 % (ref 12.3–15.4)
GLOBULIN UR ELPH-MCNC: 3 GM/DL
GLUCOSE SERPL-MCNC: 89 MG/DL (ref 65–99)
HCT VFR BLD AUTO: 35.6 % (ref 34–46.6)
HGB BLD-MCNC: 10.9 G/DL (ref 12–15.9)
IMM GRANULOCYTES # BLD AUTO: 0.02 10*3/MM3 (ref 0–0.05)
IMM GRANULOCYTES NFR BLD AUTO: 0.3 % (ref 0–0.5)
LYMPHOCYTES # BLD AUTO: 1.25 10*3/MM3 (ref 0.7–3.1)
LYMPHOCYTES NFR BLD AUTO: 18.1 % (ref 19.6–45.3)
MAGNESIUM SERPL-MCNC: 2 MG/DL (ref 1.6–2.4)
MCH RBC QN AUTO: 29.7 PG (ref 26.6–33)
MCHC RBC AUTO-ENTMCNC: 30.6 G/DL (ref 31.5–35.7)
MCV RBC AUTO: 97 FL (ref 79–97)
MONOCYTES # BLD AUTO: 0.52 10*3/MM3 (ref 0.1–0.9)
MONOCYTES NFR BLD AUTO: 7.5 % (ref 5–12)
NEUTROPHILS NFR BLD AUTO: 4.87 10*3/MM3 (ref 1.7–7)
NEUTROPHILS NFR BLD AUTO: 70.7 % (ref 42.7–76)
NRBC BLD AUTO-RTO: 0 /100 WBC (ref 0–0.2)
PHOSPHATE SERPL-MCNC: 4.1 MG/DL (ref 2.5–4.5)
PLATELET # BLD AUTO: 311 10*3/MM3 (ref 140–450)
PMV BLD AUTO: 9.4 FL (ref 6–12)
POTASSIUM SERPL-SCNC: 5.2 MMOL/L (ref 3.5–5.2)
PROT SERPL-MCNC: 6.6 G/DL (ref 6–8.5)
RBC # BLD AUTO: 3.67 10*6/MM3 (ref 3.77–5.28)
SODIUM SERPL-SCNC: 138 MMOL/L (ref 136–145)
WBC NRBC COR # BLD AUTO: 6.89 10*3/MM3 (ref 3.4–10.8)

## 2025-01-14 PROCEDURE — 36415 COLL VENOUS BLD VENIPUNCTURE: CPT

## 2025-01-14 PROCEDURE — 84100 ASSAY OF PHOSPHORUS: CPT

## 2025-01-14 PROCEDURE — 85025 COMPLETE CBC W/AUTO DIFF WBC: CPT

## 2025-01-14 PROCEDURE — 25010000002 DENOSUMAB 120 MG/1.7ML SOLUTION: Performed by: NURSE PRACTITIONER

## 2025-01-14 PROCEDURE — 80053 COMPREHEN METABOLIC PANEL: CPT

## 2025-01-14 PROCEDURE — 96372 THER/PROPH/DIAG INJ SC/IM: CPT

## 2025-01-14 PROCEDURE — 83735 ASSAY OF MAGNESIUM: CPT

## 2025-01-14 RX ADMIN — DENOSUMAB 120 MG: 120 INJECTION SUBCUTANEOUS at 13:44

## 2025-01-14 NOTE — PROGRESS NOTES
"Chief Complaint  Previous Stage Ib ([id number]) ER/IN positive, HER-2/peter negative right breast cancer with subsequent metastatic disease identified 10/8/2017, history of right pulmonary embolism, cancer related pain, chemotherapy-induced diarrhea, chemotherapy-induced mucositis, chemotherapy-induced hand-foot syndrome    Subjective        History of Present Illness  The patient is seen back today in slightly delayed follow-up.  She was initially supposed to return last week but due to an Clement whether this was rescheduled.  We last saw her in November she was reporting upcoming plans for foot surgery with Dr. Rosado at [facility].  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  We instructed her to hold her Xeloda 1 week prior and she did go on to have surgery 12/10/2024.    As she is reviewed back today she has remained off Xeloda as we instructed.  She feels that she is doing well postsurgery.  She reports that next Monday she will get a different type of cast placed per Dr. Rosado.  She is still nonweightbearing.  She denies any significant pain thankfully.  We also discussed the importance of rotating positions frequently to not develop bedsores sitting on her coccyx frequently.  She verbalized understanding of this and is trying already to rotate frequently.    Patient is also due for Xgeva today, receiving every 3 months.  Counts appropriate.    She continues with chronic pain in her shoulders likely related to her CMT as well.    She denies other concerns at this time.      Objective   Vital Signs:   /68   Pulse 90   Temp 98.3 °F (36.8 °C) (Oral)   Ht 152.4 cm (60\")   SpO2 95%   BMI 35.56 kg/m²     Physical Exam  Constitutional:       Appearance: She is well-developed.      Comments: Patient in wheelchair today after recent surgery   Eyes:      Conjunctiva/sclera: Conjunctivae normal.   Neck:      Thyroid: No thyromegaly. "   Cardiovascular:      Rate and Rhythm: Normal rate and regular rhythm.      Heart sounds: No murmur heard.     No friction rub. No gallop.   Pulmonary:      Effort: No respiratory distress.      Breath sounds: Normal breath sounds.   Abdominal:      General: Bowel sounds are normal. There is no distension.      Palpations: Abdomen is soft.      Tenderness: There is no abdominal tenderness.   Musculoskeletal:      Comments: Brace on R foot. Left foot in cast. Toes pink.   Lymphadenopathy:      Head:      Right side of head: No submandibular adenopathy.      Cervical: No cervical adenopathy.      Upper Body:      Right upper body: No supraclavicular adenopathy.      Left upper body: No supraclavicular adenopathy.   Skin:     General: Skin is warm and dry.      Findings: Rash present.      Comments: Erythema involving the palms has improved and is minimal.  There is minimal residual erythema in the dorsal aspect of the hands.  There is no significant erythema in the palmar surfaces however there is significant skin cracking, no current peeling.  There is sclerodactyly which is fairly stable from her last exam.   Neurological:      Mental Status: She is alert and oriented to person, place, and time.      Cranial Nerves: No cranial nerve deficit.      Motor: No abnormal muscle tone.      Deep Tendon Reflexes: Reflexes normal.   Psychiatric:         Behavior: Behavior normal.           Result Review :   Results from last 7 days   Lab Units 01/14/25  1257   WBC 10*3/mm3 6.89   NEUTROS ABS 10*3/mm3 4.87   HEMOGLOBIN g/dL 10.9*   HEMATOCRIT % 35.6   PLATELETS 10*3/mm3 311     Results from last 7 days   Lab Units 01/14/25  1257   SODIUM mmol/L 138   POTASSIUM mmol/L 5.2   CHLORIDE mmol/L 100   CO2 mmol/L 27.6   BUN mg/dL 21   CREATININE mg/dL 0.77   CALCIUM mg/dL 9.1   ALBUMIN g/dL 3.6   BILIRUBIN mg/dL 0.2   ALK PHOS U/L 98   ALT (SGPT) U/L 8   AST (SGOT) U/L 17   GLUCOSE mg/dL 89   MAGNESIUM mg/dL 2.0              Assessment and Plan     *Previous Stage Ib ([id number]) right breast cancer:  Diagnosed May 2010 with excisional biopsy for invasive ductal carcinoma, 1.3 cm, grade 2, ER 90%, SD 80%, HER-2/peter negative (1+ IHC).    Subsequent right mastectomy in July 2010 with no residual breast malignancy, 1/5 sentinel lymph node with micrometastasis (0.25 mm).    Treated in the [facility] system with adjuvant AC ×4 cycles in 2010 (no taxanes administered due to underlying Charcot-Saloni-Tooth with peripheral neuropathy).    Adjuvant Femara (postmenopausal) initiated October 2010 with plan to treat ×10 years.    Genetic testing reportedly negative.    Developed osteopenia treated with Prolia beginning 2/27/13. Subsequently discontinued due to identification of metastatic disease.    *Recurrent/metastatic disease identified 10/8/17:  Disease involving thoracic spine with cord compression at T6, lumbosacral involvement, sternal and right sternoclavicular involvement.    Femara discontinued in 10/2017.    Radiation administered (in the [facility] system) to the thoracic spine beginning 10/19/17 treating T3-T9 to a dose of 24 gray in 6 fractions.  Evaluation with MRI 12/8/17 showing persistent T6 cord compression with persistent neurologic compromise requiring surgical treatment 12/11/17 with T6 laminectomy/corpectomy and T3-T9 fusion.  Pathology with metastatic carcinoma of breast origin, ER negative, SD negative, HER-2/peter negative (1+ IHC).  Additional staging evaluation 12/8/17 with no evidence of visceral metastatic disease, bone scan showing involvement of thoracic spine, sternum, left humerus, mid frontal bone.  No plane film correlate of left humerus lesion.  MRI lumbar spine with small intradural L3 metastasis.  CA 15-3 12/6/17- 17.  Palliative radiation therapy to L3 dural metastasis and left humerus initiated 1/15/18 (10 fractions), completed 1/26/18.  Hypercalcemia of malignancy with calcium in the 10-11 range.  Initiation of  monthly Xgeva 1/23/18.  Baseline CT scan 1/30/18 with no evidence of visceral involvement.  Cluster of nodular opacities in the right lower lobe suspected to be infectious or related to bronchiolitis. Bone scan 1/30/18 showed postsurgical change in the thoracic spine, stable uptake in the frontal bone, no new areas of disease.  Initiation of palliative oral single agent Xeloda 2/7/18 2000 mg a.m., 1500 mg p.m. for 14/21 days.   Following 3 cycles xeloda, bone scan 4/4/18 showed no change from the prior study.  CT scan 4/4/18 showed a small pericardial effusion of unclear significance as well as a subcutaneous nodule in the right lateral chest wall.  Subsequent evaluation with echocardiogram 4/17/18 showed no evidence of pericardial effusion.  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  We discussed the possibility of surgical excision of the right subcutaneous chest wall lesion for more definitive diagnosis.  Reviewed previous CT images dating back to 12/8/17 and the lesion was present even at that time measuring around 1.7 cm although not commented on in the radiology report.  As this appears to be an indolent low-grade process unrelated to her breast cancer, recommendee foregoing surgical excision at this time and monitoring this area on future scans.  The patient agreed.    Following 6 cycles of Xeloda, CT 6/6/18  showed stable findings, no evidence of progressive disease.  There was a comment regarding subcutaneous abnormality in the anterior abdominal wall and this was related to Lovenox injection sites.  Bone scan 6/6/18 showed no interval change.   CT scan and bone scan 8/13/18 following 9 cycles of Xeloda showed stable findings with no evidence of significant visceral metastases.  Her bone lesions appear stable on bone scan.  The spindle cell neoplasm in her right  chest wall actually decreased in size from 2 cm down to 1.6 cm.    The patient experienced some symptoms of diarrhea, anorexia, generalized weakness during cycle 9 Xeloda it was unclear whether this was related to a viral gastroenteritis or toxicity from treatment.  Symptoms recurred during cycle 10 and treatment was cut short by 2 days.  Symptoms attributed to Xeloda.  With cycle 11, dose and schedule altered to 1500 mg twice daily for 7 days on followed by 7 days off .  CT scan 9/9/2020 with no significant changes.  Bone scan 9/9/2020 read as unchanged from prior studies however did note an area of slight activity in the medial left femur.  Contacted radiology and although this was not noted on prior reports appears to have been present.  Subsequent MRI left femur 9/21/2020 with cortical thickening and periosteal edema left iliac us muscle insertion to the medial left femur with no evidence of metastatic disease, favored to represent periosteal change secondary to insertional tendinitis.  Severe hand-foot symptoms causing sclerodactyly and limitation in finger movement prompted change in dosing in July 2021 with Xeloda decreased to 1000 mg a.m., 1500 mg p.m. for 7 days on followed by 7 days off.  Patient was experiencing severe hand-foot syndrome related to Xeloda having developed skin peeling, sclerodactyly with limited use of her hands.  On 3/31/2022, Xeloda was held to allow for recovery.  Patient resumed Xeloda on 4/18/2022.  Patient required hospitalization 5/29 - 6/1/2022 with presumed viral gastroenteritis that was exacerbated by Xeloda.  Xeloda held briefly, resumed on 6/6/2022.  Patient again with worsening sclerodactyly, Xeloda held for 2 weeks from 10/3 - 10/17/2022, resumed at prior dose with improvement in symptoms.  Scans on 3/3/2023 with CT chest abdomen pelvis and bone scan showing stable findings.  CT chest abdomen pelvis 7/3/2023 with questionable 1 mm change in size right lower lobe nodule.   Additional small pulmonary nodules stable.  Stable bony metastatic disease.  Bone scan on 7/7/2023 however showed slight progression focal involvement right ischium and superior pubic ramus (new ischial lesion superior pubic ramus compared to 10/24/2022 and more conspicuous compared to 3/3/2023).  Metastatic lesion right inferior pubic ramus and ischial tuberosity increased in size since October 2022 however stable from 3/3/2023.  MRI cervical spine 7/3/2023 with no metastatic involvement however identification of the lesion at T2, recommended dedicated thoracic spine MRI to evaluate further.  Given the minimal extent and slow degree of progression, elected to continue oral Xeloda and evaluate further with MRI pelvis and thoracic spine.  Consideration of biopsy pelvic metastasis for repeat ER/MO/HER2/peter testing.    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  CA 15-3 on 7/10/2023 was 12.2, uninformative.  MRI thoracic spine 7/27/2023 with 1 cm metastatic focus seen in L1  MRI pelvis 7/27/2023 with metastatic foci identified right superior pubic ramus, right ischial tuberosity, inferior pubic ramus, L5 body.  Chronic appearing posttraumatic and/or degenerative change in the posterior right ilium adjacent SI joint.  CT-guided biopsy right pubic ramus lesion on 8/31/2023.  Pathology showed no evidence of malignancy, showed markedly calcified and sclerotic mature lamellar bone.  Attempted decalcification but no evidence of malignancy.  CT chest abdomen pelvis 9/8/2023 and bone scan 9/11/2023 with stable findings.  CT chest abdomen pelvis 12/8/2023 with possible slight increase in right chest wall subcutaneous lesion (1.8 x 1.1 up to 1.8 x 1.4 cm) although may be related to altered positioning.  Nonpathologically enlarged right axillary and subpectoral lymph nodes slightly increased (patient notes she received RSV vaccine right arm just prior to scan).  Bone scan  12/11/2023 with stable findings.  CT chest abdomen pelvis 3/19/2024 with slight interval decrease in size of right axillary and bilateral subpectoral lymph nodes, right subcutaneous soft tissue nodule slightly smaller, stable pulmonary nodules, stable bone metastases.  Bone scan 3/19/2024 with stable findings.  CT chest abdomen pelvis 6/21/2024 showed left subpectoral lymph node to have increased slightly in size (0.9 x 0.6 up to 1.3 x 0.8 cm).  Subcutaneous nodule right chest wall decreased from 1.1 x 1.9 down to 1.1 x 1.4 cm.  Scan otherwise stable.  Bone scan 6/21/2024 with stable findings  CT chest abdomen pelvis 9/23/2024 with resolution of left subpectoral lymphadenopathy, additional findings stable.  Bone scan 9/23/2024 with stable findings.  12/2024 Xeloda held 1 week prior to patient's surgery on her left foot as further detailed below.  Remained on hold postsurgery.  1/14/2025 patient reviewed back today in follow-up.  She had surgery to her left foot in relation to complications from CMT and is doing well postoperatively.  She denies any pain.  She will follow-up with Dr. Rosado, orthopedics, next Monday and expects to get a different cast at that time.  Per discussion with Dr. Hancock patient given okay to restart Xeloda at 1000 mg in the a.m., 1500 mg in the p.m. to be taken for 7 days on 7 days off.  She was educated to monitor closely for poor wound healing and to contact us with such.  Otherwise we will plan to see her back in roughly 6 weeks for reevaluation.    *Right pulmonary embolism:  Diagnosed on CT angiogram 10/21/17 involving small right lower lobe pulmonary artery.  Lower extremity Dopplers negative.  Bilateral lower extremity Dopplers negative again 12/5/17.  Received chronic Lovenox 1 mg/kg twice per day, transitioned to oral Eliquis in February 2019, continuing on Eliquis 5 mg twice daily.  Patient continues on Eliquis 5 mg twice daily    *Cancer related pain:  Previously receiving  Duragesic 50 µg patch every 72 hours along with Dilaudid 4 mg as needed for breakthrough pain  The patient's pain improved over time and she was able to discontinue both Duragesic and Dilaudid in the interval.  Patient does take occasional Flexeril at bedtime due to back spasm/pain when she has been more active.  The patient does have some occasional aggravation of her chronic back pain and does use tramadol 50 mg every 8 hours as needed  Patient was receiving tramadol 50 mg every 8-12 hours as needed in addition to hydrocodone 5/325 every 4 hours as needed.  She was also taking OTC NSAIDs.  Patient developed nausea related to hydrocodone and was transitioned to Percocet 5/325 every 4 hours as needed, advised to stop taking NSAIDs with ongoing anticoagulation.  Patient notes pain in her shoulders that does wax and wane is unrelated to her malignancy.  She does continue on tramadol 50 mg every 8 hours and Percocet 5/325 every 4 hours as needed    *Chemotherapy-induced diarrhea with subsequent C. difficile colitis in the setting of previous ulcerative colitis and then identification of enteropathogenic E. coli:  Patient with reported history of ulcerative colitis, continues on mesalamine.  The patient developed initial diarrhea related to Xeloda at regular dosing.  Symptoms improved on reduced dose Xeloda  Flare of symptoms in October 2018 with apparent finding of C. difficile colitis by GI, treated with course of oral vancomycin with improvement in symptoms.  Patient notes minimal intermittent diarrhea/loose stools on Xeloda requiring occasional dosing of Imodium.    Patient required hospitalization 5/29 - 6/1/2022 with presumed viral gastroenteritis that was exacerbated by Xeloda.  Xeloda held briefly, resumed on 6/6/2022.  Patient's  developed C. difficile colitis and patient was experiencing increase in diarrhea.  Stool studies performed 8/4/22 negative C. difficile however GI PCR was positive for  enteropathogenic E. coli.  Given patient's symptoms and immunocompromise status it was elected to treat her with azithromycin 500 mg daily x3 days beginning 8/5/2022  Diarrhea resolved  Patient underwent colonoscopy on 2/28/2023 with findings of diverticulosis  Patient notes fairly infrequent diarrhea.  Every few weeks and requires minimal dosing of Imodium.    *Traumatic left tibia/fibular fracture:  Status post ORIF 12/6/17  Specimen was sent for pathologic review, negative for evidence of malignancy    *Hypercalcemia:  Suspect hypercalcemia of malignancy, calcium in  10-11 range previously.  Calcium normalized following initiation of monthly Xgeva on 1/23/18.  Patient now receiving Xgeva every 3 months, due today, 1/14/2025.    *Chemotherapy-induced mucositis:  Patient has not experienced any recent difficulty with mucositis.    *Recurrent UTI, bladder wall thickening on CT:  Patient had an enterococcal UTI on 3/2/18 sensitive to nitrofurantoin and received treatment, unclear how long.  Recurrent UTI 3/20/18 with urine culture growing Klebsiella, initially treated with nitrofurantoin, transitioned to Levaquin.  CT 4/4/18 with diffuse bladder wall thickening with increased nodular thickening at the left base.  Referral to urogynecology Dr. May Johnson.  She was placed on a prophylactic dose of trimethoprim 100 mg daily, bladder wall thickening felt to be related to recent recurrent urinary tract infections.  Patient with urinary symptoms, treated with course of Macrobid at the end of December 2018, urine culture however was negative 12/31/18.  Patient was found to have Klebsiella UTI 7/29/2019 which was successfully treated with Macrobid with complete resolution of symptoms.  CT 8/10/2021 with diffuse bladder wall thickening new from 5/17/2021 (however seen on multiple prior scans).    UTI on 10/18/2021 with E. coli greater than 100,000 colonies that was pansensitive, treated with Macrobid x7 days  With  ongoing/recurrent UTIs patient was seen by urogynecology and initiated suppressive therapy with trimethoprim 100 mg daily in December 2021.  She has not experienced any further urinary tract infections.  CT abdomen pelvis 3/28/2022 does show diffuse thickening of urinary bladder as has been seen on prior studies indicative of cystitis.  Patient notes that she did stop taking trimethoprim on a daily basis.    Patient with urine culture on 5/5/2023 that grew E. coli and she received antibiotic treatment from urogynecology.    Urine culture on 8/23/2023 with greater than 100,000 colonies of E. coli resistant to ampicillin and Bactrim.  Received 5-day course of Cipro with resolution of symptoms.  Patient did have UTI with E. coli on culture 10/2/2023, received a course of Augmentin.  Patient with ongoing intermittent urinary tract infections.  She has been placed on prophylactic dose of Keflex daily per her PCP and was referred to a new urogynecologist.  She was also prescribed Estrace cream (see above discussion).    *Charcot-Saloni-Tooth with mobility difficulties:  The patient underwent an intensive course of rehabilitation at [facility].  She graduated from her outpatient course November 2018.  Overall the patient has improved dramatically in terms of mobility,   Patient developed significant callus formation lateral aspect of the left plantar surface.    Patient has developed skin erosion and an ulceration on the lateral aspect of her left foot.  She has seen Dr. Rosado at [facility] and now will require surgery, scheduled 12/10/2024.  We have discussed holding her Xeloda surrounding this as outlined above.  She anticipates being discharged to an inpatient rehab facility.      *Depression:  The patient is continuing follow-up in the supportive oncology clinic and is currently continuing on Cymbalta 30 mg twice daily as well as gabapentin 900 mg nightly, Ativan 0.5 mg nightly as needed, Provigil 100 mg daily.  Patient  does continue to attend support groups at [employer].  The patient does continue routine follow-up in supportive oncology clinic.    Patient does have multiple stressors with her 's malignancy and chemotherapy as well as her autistic son who is living with them at home.  Patient reports that she continues to deal with grief related to her 's death this past year.  She continues to see a grief counselor through hospice which has helped significantly.  She continues routine follow-up with supportive oncology as well.    *Hand-foot syndrome secondary to Xeloda:  Patient continues with frequent application of emollient cream to the hands and feet  Symptoms increased significantly requiring a 1 week delay in cycle 18 Xeloda as noted above.  Symptoms did improve and she continued on the same dose.    Patient was referred to dermatology and has been continuing on triamcinolone 0.1% cream used for 1 week on followed by 1 week off which led to some further improvement.   Progressive palmar erythema with development of sclerodactyly and effect on dexterity.  Addition of urea-based cream 3 times daily.  Patient with continued difficulty regarding erythema of her hands that extended onto the dorsal aspect and was causing contractures/sclerodactyly in her fingers affecting her dexterity.  In July 2021, held Xeloda for an additional week and then reduced dose from 1500 mg twice daily down to 1000 mg a.m. and 1500 mg p.m. and continued on a 7-day on followed by 7-day off schedule.  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  On 3/31/2022, Xeloda was held to allow for recovery.  Patient resumed Xeloda on 4/18/2022.  Patient developed worsening sclerodactyly affecting dexterity that  required Xeloda to again be briefly held for 2 weeks from 10/3 - 10/17/2022.  Treatment resumed at prior dose after improvement in symptoms.  Patient continues to use emollient cream frequently (currently 5 times daily) and topical triamcinolone 0.1% twice daily intermittently (2 weeks on followed by 2 weeks off as instructed by dermatology).  Patient with overall improvement in involvement of the hands, no current erythema but continued scaling and cracking of the skin on the palmar surface.  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  She feels that symptoms are tolerable.  Symptoms involving the feet are significantly less than the hands.  Unclear to what extent Xeloda is also contributing to patient's left foot wound although apparently that is healing well currently.    *Evidence of steatosis on scans with mild intermittent elevated liver function studies:  Liver function studies increased 8/20/19 with ALT 98, AST 70, normal total bilirubin.  Negative viral hepatitis A, B, and C panel 8/23/18  Likely related to hepatic steatosis.   Subsequent improvement in LFTs  LFTs today are normal    *Chemotherapy induced leukopenia:  WBC today is normal at 4.68    *GERD, question of celiac disease:  Patient with significant history of reflux  Patient currently receiving Protonix 40 mg daily daily and Carafate 1 g 4 times daily as needed  Patient required hospitalization 5/29 - 6/1/2022 with presumed viral gastroenteritis that was exacerbated by Xeloda.    EGD performed 5/31/2022 during hospitalization with biopsy that showed focal blunting of villi and atrophy with slight increase in intraepithelial lymphocytes.  Changes were minimal but recommended correlation with serology to exclude celiac disease.  Celiac serology 8/8/2022 negative  Patient previously developed worsening reflux symptoms, temporarily increase Protonix to 40 mg twice daily and we did add Carafate  1 g 4 times daily.    She is taking Protonix 40 mg once daily, is not taking Carafate.    *Insomnia:  Patient with prior paradoxical reaction to Benadryl  Improved previously on temazepam 15 mg nightly as needed.   Patient noted subsequently that temazepam was having no effect.   Patient continuing on gabapentin 900 mg nightly    *Health maintenance:  Patient notes that she has a history of colon polyps as well as ulcerative colitis and was due for a follow-up colonoscopy on 9/12/2019.  We did discuss there is no necessity to pursue colonoscopy in the setting of her metastatic breast cancer.    The patient did undergo colonoscopy on 2/7/2020 with findings of muscular hypertrophy and diverticulosis.   Patient did wish to proceed with further colonoscopic evaluation, performed on 12/20/2022 with poor prep.  Repeat 2/28/2023 with diverticulosis.    *Bilateral shoulder pain:  Chronic difficulty with right shoulder although she has not complained of this in prior visits to our office.  She reported difficulty with abduction.    The patient did undergo MRI of her right shoulder on 11/21/2019 at an outside facility showing multiple abnormalities including supraspinatus tendinosis, labral tear but no evidence of metastatic disease.  Patient developed pain in the left shoulder, was seen by orthopedics and underwent steroid injection with some improvement.  Right shoulder stable.  Patient did undergo physical therapy with some improvement.  She continues to use tramadol 50 mg every 8-12 hours as needed.  Note that current CT 10/20/2022 made notation of left shoulder probable bursitis.    Patient continues with bilateral shoulder pain, left greater than right which does wax and wane.      *Ocular changes in part related to Xeloda:  Patient experienced a mild degree of blurred vision as well as burning and pruritus.  She was seen by her ophthalmologist and was placed on xiidra ophthalmic drops which have helped.  Likely both  issues are to some extent related to Xeloda.  Symptoms did worsen and patient was seen by ophthalmologist at [facility].  She is now using an ocular lubricant at night in addition to artificial tears which have helped.  Symptoms currently stable to improved    *Elevated glucose:  It is noted the patient's glucose at the last few visits has been in the high 100 range, postprandial.  She has had a hemoglobin A1c that has been in the high 5-6 range in the past  Hemoglobin A1c was last checked on 9/9/2024 at 5.9    *Possible loosening of pedicle screw at T3:  CT cervical spine 5/17/2021 showed loosening of the left pedicle screw at T3.  Patient referred to orthopedics and was seen by Dr. Nayak who felt that there were no concerning findings on the scan    *Iron deficiency anemia  Labs on 5/2/2022 with hemoglobin 10.8.  Additional labs with iron 48, ferritin 21.2, iron saturation 11%, TIBC 4 and 32, folate greater than 20, B12 greater than 2000.    Attempted treatment with oral iron daily however patient was intolerant  Patient subsequently received Venofer 300 mg weekly x4 beginning 6/6/2022 and completed on 6/27/2022  Subsequent iron studies on 7/11/2022 showed improvement with iron 62, ferritin 345, iron saturation 18%, TIBC 336.  Hemoglobin had improved up to 12.7 at that time.  Repeat iron studies on 10/3/2022 showed iron replete status with hemoglobin 13.7, iron 121, ferritin 208.7, iron saturation 31%, TIBC 392.  11/11/2024 Hgb 12.4  1/14/2025 Hgb down to 10.9.  This is felt to be related to recent surgery.  We will monitor.     Plan:  Proceed with Xgeva today, being received every 3 months.  Resume palliative single agent Xeloda 1000 mg a.m., 1500 mg in the p.m. 7 days on followed by 7 days off (patient has been on single agent Xeloda since 2/7/2018 -however has been off for the last month due to recent foot surgery).  Educated to call with any poor wound healing in relation to recent foot  surgery.  Also encouraged to maintain good position changing, avoiding prolonged sitting on her coccyx which could lead to skin breakdown.  Continue Eliquis 5 mg twice daily  The patient will continue frequent use of emollient cream currently 5 times daily and continue periodic triamcinolone cream 0.1% twice daily (provided by dermatology) 2 weeks on followed by 2 weeks off as prescribed  Continue Protonix 40 mg daily  Continue ocular lubricating gel nightly per ophthalmology  Continue Provigil 100 mg daily and Cymbalta 30 mg twice daily.  Patient continues routine follow-up with supportive oncology   Patient will continue gabapentin 900 mg at night.   Patient continues on tramadol 50 mg every 8 hours as needed for pain  Continue Percocet 5/325 every 4 hours as needed for pain  Patient continues with grief counseling with hospice in regards to recent death of her   In 5 weeks repeat CT scans chest, abdomen, pelvis as well as bone scan.  Return in 6 weeks for follow-up with Dr. Hancock with CBC, CMP. I will discuss with him timing of repeat CT scans.  Due again in 12 weeks for next Xgeva.      Patient continuing on high risk medication requiring intensive monitoring.

## 2025-01-14 NOTE — PROGRESS NOTES
Specialty Pharmacy Patient Management Program  Oncology Reassessment     Martha Davey was referred by an their provider to the Oncology Patient Management program offered by [facility] for Breast Cancer. A follow-up outreach was conducted, including assessment of continued therapy appropriateness, medication adherence, and side effect incidence and management for capecitabine (Xeloda).    Changes to Insurance Coverage or Financial Support  None    Relevant Past Medical History and Comorbidities  Relevant medical history and concomitant health conditions were discussed with the patient. The patient's chart has been reviewed for relevant past medical history and comorbid health conditions and updated as necessary.   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    Charcot-Saloni-Tooth disease     CPAP (continuous positive airway pressure) dependence     Dry eye     GERD (gastroesophageal reflux disease)     H/O Colon polyps     H/O Uterine fibroid     Heart murmur     Hyperlipidemia     Hypertension     PONV (postoperative nausea and vomiting)      Social History     Socioeconomic History    Marital status:      Spouse name: Murray    Number of children: 3    Years of education: College   Tobacco Use    Smoking status: Never    Smokeless tobacco: Never   Vaping Use    Vaping status: Never Used   Substance and Sexual Activity    Alcohol use: Yes     Comment: social    Drug use: No    Sexual activity: Defer     Problem list reviewed by Lubna Gupta RPH on 1/14/2025 at  2:15 PM    Hospitalizations and Urgent Care Since Last Assessment  ED Visits, Admissions, or Hospitalizations: Held medication for foot surgery - last dose was 12/1, restarting 1/20  Urgent Office Visits:  None    Allergies  Known allergies and reactions were discussed with the patient. The patient's chart has been reviewed for allergy information and updated as necessary.   Allergies   Allergen Reactions    Hydrocodone Nausea Only    Morphine And Codeine Nausea And Vomiting     Allergies reviewed by Lubna Gupta RPH on 1/14/2025 at  2:14 PM    Relevant Laboratory Values  Relevant laboratory values were discussed with the patient. The following specialty medication dose adjustment(s) are recommended: No dose adjustments are needed for the oral specialty medication(s) based on the labs.    Lab Results   Component Value Date    GLUCOSE 89 01/14/2025    CALCIUM 9.1 01/14/2025     01/14/2025    K 5.2 01/14/2025    CO2 27.6 01/14/2025     01/14/2025    BUN 21 01/14/2025    CREATININE 0.77 01/14/2025    EGFRIFAFRI 85 11/12/2021    EGFRIFNONA 56 (L) 02/21/2022    BCR 27.3 (H) 01/14/2025    ANIONGAP 10.4 01/14/2025     Lab Results   Component Value Date    WBC 6.89 01/14/2025    RBC 3.67 (L) 01/14/2025    HGB 10.9 (L) 01/14/2025    HCT 35.6 01/14/2025    MCV 97.0 01/14/2025    MCH 29.7 01/14/2025    MCHC 30.6 (L) 01/14/2025    RDW 15.9 (H) 01/14/2025    RDWSD 57.1 (H) 01/14/2025    MPV 9.4 01/14/2025     01/14/2025    NEUTRORELPCT 70.7 01/14/2025    LYMPHORELPCT 18.1 (L) 01/14/2025    MONORELPCT 7.5 01/14/2025    EOSRELPCT 2.8 01/14/2025    BASORELPCT 0.6 01/14/2025    AUTOIGPER 0.3 01/14/2025    NEUTROABS 4.87 01/14/2025    LYMPHSABS 1.25 01/14/2025    MONOSABS 0.52 01/14/2025    EOSABS 0.19 01/14/2025    BASOSABS 0.04 01/14/2025    AUTOIGNUM 0.02 01/14/2025    NRBC 0.0 01/14/2025       Current Medication List  This medication list has been reviewed with the patient and evaluated for any interactions or necessary modifications/recommendations, and updated to include all prescription medications, OTC medications, and supplements the patient is currently taking.  This list reflects what is contained in  the patient's profile, which has also been marked as reviewed to communicate to other providers it is the most up to date version of the patient's current medication therapy.     Current Outpatient Medications:     apixaban (Eliquis) 5 MG tablet tablet, TAKE 1 TABLET BY MOUTH EVERY 12  HOURS, Disp: 180 tablet, Rfl: 3    azelastine (ASTELIN) 0.1 % nasal spray, 2 sprays into the nostril(s) as directed by provider 2 (Two) Times a Day. Use in each nostril as directed, Disp: 30 mL, Rfl: 12    calcium carbonate (OS-CARY) 600 MG tablet, Take 1 tablet by mouth 2 (Two) Times a Day With Meals., Disp: , Rfl:     capecitabine (XELODA) 500 MG chemo tablet, TAKE 2 TABLETS (1000MG) BY MOUTH EVERY MORNING AND 3 TABLETS (1500MG) BY MOUTH EVERY EVENING FOR 7 DAYS. TAKE 7 DAYS OFF, THEN REPEAT CYCLE., Disp: 70 tablet, Rfl: 5    cephalexin (Keflex) 500 MG capsule, Take 1 capsule by mouth 2 (Two) Times a Day., Disp: 14 capsule, Rfl: 0    cholecalciferol (VITAMIN D3) 1000 units tablet, Take 1 tablet by mouth Daily., Disp: , Rfl:     ciprofloxacin (CIPRO) 500 MG tablet, Take 1 tablet by mouth 2 (Two) Times a Day., Disp: 10 tablet, Rfl: 0    CRANBERRY PO, Take  by mouth., Disp: , Rfl:     Cyanocobalamin ER 1000 MCG tablet controlled-release, Take 1 tablet by mouth Daily., Disp: , Rfl:     Diclofenac Sodium (VOLTAREN) 1 % gel gel, Place 4 g on the skin as directed by provider., Disp: , Rfl:     DULoxetine (Cymbalta) 30 MG capsule, Take 1 capsule by mouth Daily. Take in conjunction with 60 mg q evening., Disp: 90 capsule, Rfl: 0    DULoxetine (Cymbalta) 60 MG capsule, Take 1 capsule by mouth Daily., Disp: 90 capsule, Rfl: 3    FLONASE ALLERGY RELIEF 50 MCG/ACT nasal spray, Administer 2 sprays into the nostril(s) as directed by provider Daily., Disp: , Rfl: 11    gabapentin (NEURONTIN) 300 MG capsule, Take 1 capsule by mouth 3 (Three) Times a Day., Disp: 270 capsule, Rfl: 1    Hylan (SYNVISC) 16 MG/2ML solution prefilled syringe injection,  Inject 2 mL into the appropriate joint as directed by provider As Needed., Disp: , Rfl:     losartan (Cozaar) 25 MG tablet, Take 1 tablet by mouth Daily., Disp: 90 tablet, Rfl: 3    mesalamine (LIALDA) 1.2 g EC tablet, TAKE 1 TABLET BY MOUTH TWICE  DAILY, Disp: 180 tablet, Rfl: 3    metaxalone (Skelaxin) 800 MG tablet, Take 1 tablet by mouth 3 (Three) Times a Day As Needed for Muscle Spasms., Disp: 30 tablet, Rfl: 0    methenamine (HIPREX) 1 g tablet, Take 1 tablet by mouth Daily., Disp: , Rfl:     modafinil (PROVIGIL) 200 MG tablet, Take 1 tablet by mouth Daily., Disp: 30 tablet, Rfl: 2    Multiple Vitamins-Minerals (Multivitamin Gummies Womens) chewable tablet, Chew 1 tablet Daily., Disp: , Rfl:     mupirocin (BACTROBAN) 2 % ointment, Apply 1 Application topically to the appropriate area as directed As Needed., Disp: , Rfl:     ondansetron ODT (ZOFRAN-ODT) 8 MG disintegrating tablet, Place 1 tablet on the tongue Every 8 (Eight) Hours As Needed for Nausea or Vomiting., Disp: 30 tablet, Rfl: 1    oxyCODONE-acetaminophen (Percocet) 7.5-325 MG per tablet, Take 1 tablet by mouth Every 8 (Eight) Hours As Needed for Moderate Pain., Disp: 90 tablet, Rfl: 0    pantoprazole (PROTONIX) 40 MG EC tablet, TAKE 1 TABLET BY MOUTH DAILY, Disp: 90 tablet, Rfl: 3    phenazopyridine (Pyridium) 200 MG tablet, Take 1 tablet by mouth 3 (Three) Times a Day As Needed for Bladder Spasms., Disp: 15 tablet, Rfl: 3    Probiotic Product (PROBIOTIC-10 PO), Take  by mouth., Disp: , Rfl:     rosuvastatin (CRESTOR) 5 MG tablet, TAKE 1 TABLET BY MOUTH DAILY, Disp: 90 tablet, Rfl: 3    saccharomyces boulardii (Florastor) 250 MG capsule, Take 1 capsule by mouth 2 (Two) Times a Day., Disp: 20 capsule, Rfl: 0    triamcinolone (KENALOG) 0.1 % cream, Apply  topically to the appropriate area as directed 2 (Two) Times a Day., Disp: 15 g, Rfl: 0    urea (CARMOL) 10 % cream, Apply  topically to the appropriate area as directed 3 (Three) Times a Day., Disp:  453 g, Rfl: 1    Vibegron (Gemtesa) 75 MG tablet, Take 1 tablet by mouth Daily., Disp: 30 tablet, Rfl: 5    White Petrolatum-Mineral Oil (Wh Petrol-Mineral Oil-Lanolin) 0.1-0.1 % ointment, Apply  to eye(s) as directed by provider., Disp: , Rfl:   No current facility-administered medications for this visit.    Medicines reviewed by Lubna Gupta RPH on 1/14/2025 at  2:15 PM    Drug Interactions  Assessed medication list for interactions, no significant drug interactions noted.   Advised patient to call the clinic if any new medications are started so we can assess for drug-drug interactions.  Drug-food interactions discussed:  None    Adverse Drug Reactions  Medication tolerability: Tolerating with no to minimal ADRs  Medication plan: Continue therapy with normal follow-up  Plan for ADR Management: None    Adherence, Self-Administration, and Current Therapy Problems  Adherence related to the patient's specialty therapy was discussed with the patient. The Adherence segment of this outreach has been reviewed and updated.     Adherence Questions  Linked Medication(s) Assessed: Capecitabine (XELODA)  On average, how many doses/injections does the patient miss per month?: 0 (on hold due to surgery)  What are the identified reasons for non-adherence or missed doses? : no problems identified  What is the estimated medication adherence level?: %  Based on the patient/caregiver response and refill history, does this patient require an MTP to track adherence improvements?: no    Additional Barriers to Patient Self-Administration: None  Methods for Supporting Patient Self-Administration: None  Patient has had no issues obtaining medication from pharmacy.    Open Medication Therapy Problems  No medication therapy recommendations to display    Goals of Therapy  Goals related to the patient's specialty therapy were discussed with the patient. The Patient Goals segment of this outreach has been reviewed and updated.   Goals  Addressed Today        Specialty Pharmacy General Goal      Progression free survival               Quality of Life Assessment   Quality of Life related to the patient's enrollment in the patient management program and services provided was discussed with the patient. The QOL segment of this outreach has been reviewed and updated.  Quality of Life Improvement Scale: 6-A little better    Discussed aforementioned material with patient in person, face-to-face, in clinic.     Reassessment Plan & Follow-Up  1. Medication Therapy Changes: None  2. Related Plans, Therapy Recommendations, or Issues to Be Addressed: None  3. Pharmacist to perform regular assessments no more than (6) months from the previous assessment.   4. Care Coordinator to set up future refill outreaches, coordinate prescription delivery, and escalate clinical questions to pharmacist.    Attestation  Therapeutic appropriateness: Appropriate   I attest the patient was actively involved in and has agreed to the above plan of care.  If the prescribed therapy is at any point deemed not appropriate based on the current or future assessments, a consultation will be initiated with the patient's specialty care provider to determine the best course of action. The revised plan of therapy will be documented along with any required assessments and/or additional patient education provided.     TAHMINA Taveras, Pharmacy Intern  Clinical Specialty Pharmacist, Oncology  1/14/2025  14:15 EST

## 2025-01-15 ENCOUNTER — SPECIALTY PHARMACY (OUTPATIENT)
Dept: PHARMACY | Facility: HOSPITAL | Age: 65
End: 2025-01-15
Payer: MEDICARE

## 2025-02-06 ENCOUNTER — TELEPHONE (OUTPATIENT)
Dept: INTERNAL MEDICINE | Facility: CLINIC | Age: 65
End: 2025-02-06
Payer: MEDICARE

## 2025-02-06 NOTE — TELEPHONE ENCOUNTER
Amy with [facility] request a call back to get verbal orders to continue PT. Also, to get Skilled Nursing and to be evaluated by a Medical Social Worker. Please call Amy at [phone] to discuss.

## 2025-02-10 ENCOUNTER — SPECIALTY PHARMACY (OUTPATIENT)
Dept: PHARMACY | Facility: HOSPITAL | Age: 65
End: 2025-02-10
Payer: MEDICARE

## 2025-02-14 ENCOUNTER — HOSPITAL ENCOUNTER (OUTPATIENT)
Dept: NUCLEAR MEDICINE | Facility: HOSPITAL | Age: 65
Discharge: HOME | End: 2025-02-14
Payer: MEDICARE

## 2025-02-14 ENCOUNTER — OFFICE VISIT (OUTPATIENT)
Dept: PSYCHIATRY | Facility: HOSPITAL | Age: 65
End: 2025-02-14
Payer: MEDICARE

## 2025-02-14 ENCOUNTER — HOSPITAL ENCOUNTER (OUTPATIENT)
Dept: CT IMAGING | Facility: HOSPITAL | Age: 65
Discharge: HOME | End: 2025-02-14
Payer: MEDICARE

## 2025-02-14 DIAGNOSIS — Z17.1 MALIGNANT NEOPLASM OF OVERLAPPING SITES OF RIGHT BREAST IN FEMALE, ESTROGEN RECEPTOR NEGATIVE: ICD-10-CM

## 2025-02-14 DIAGNOSIS — R30.0 DYSURIA: ICD-10-CM

## 2025-02-14 DIAGNOSIS — R53.0 NEOPLASTIC MALIGNANT RELATED FATIGUE: ICD-10-CM

## 2025-02-14 DIAGNOSIS — F33.41 RECURRENT MAJOR DEPRESSIVE DISORDER, IN PARTIAL REMISSION: ICD-10-CM

## 2025-02-14 DIAGNOSIS — C50.811 MALIGNANT NEOPLASM OF OVERLAPPING SITES OF RIGHT BREAST IN FEMALE, ESTROGEN RECEPTOR NEGATIVE: ICD-10-CM

## 2025-02-14 DIAGNOSIS — F43.21 GRIEF: ICD-10-CM

## 2025-02-14 DIAGNOSIS — C50.919 PRIMARY MALIGNANT NEOPLASM OF BREAST WITH METASTASIS: Primary | ICD-10-CM

## 2025-02-14 PROCEDURE — 78306 BONE IMAGING WHOLE BODY: CPT

## 2025-02-14 PROCEDURE — 74177 CT ABD & PELVIS W/CONTRAST: CPT

## 2025-02-14 PROCEDURE — 34310000005 TECHNETIUM MEDRONATE KIT: Performed by: NURSE PRACTITIONER

## 2025-02-14 PROCEDURE — A9503 TC99M MEDRONATE: HCPCS | Performed by: NURSE PRACTITIONER

## 2025-02-14 PROCEDURE — 71260 CT THORAX DX C+: CPT

## 2025-02-14 PROCEDURE — 25510000001 IOPAMIDOL 61 % SOLUTION: Performed by: NURSE PRACTITIONER

## 2025-02-14 RX ORDER — IOPAMIDOL 612 MG/ML
100 INJECTION, SOLUTION INTRAVASCULAR
Status: COMPLETED | OUTPATIENT
Start: 2025-02-14 | End: 2025-02-14

## 2025-02-14 RX ORDER — TC 99M MEDRONATE 20 MG/10ML
22.1 INJECTION, POWDER, LYOPHILIZED, FOR SOLUTION INTRAVENOUS
Status: COMPLETED | OUTPATIENT
Start: 2025-02-14 | End: 2025-02-14

## 2025-02-14 RX ORDER — MODAFINIL 200 MG/1
200 TABLET ORAL DAILY
Qty: 30 TABLET | Refills: 2 | Status: SHIPPED | OUTPATIENT
Start: 2025-02-14

## 2025-02-14 RX ADMIN — IOPAMIDOL 85 ML: 612 INJECTION, SOLUTION INTRAVENOUS at 10:11

## 2025-02-14 RX ADMIN — Medication 22.1 MILLICURIE: at 09:20

## 2025-02-14 NOTE — PROGRESS NOTES
Supportive Oncology Services  In Person Session    Subjective  Patient ID: Martha Davey is a 64 y.o. female is seen face to face in the [facility]cology [facility] ([facility]) Clinic.    CC: Grief, fatigue, mood disruption, PTSD    HPI/ Interval History:   Pt is seen in follow up regarding ongoing anxiety, depression, and grief in survivorship pf metastatic breast cancer, recent loss of , now recovering from recent foot surgery. Endorse some anxiety with break from treatment to allow for surgery, eager to resume. Does report increase in anxiety with upcoming scan, anxiously anticipating upcoming apt with Dr. Hancock next week.    Continues to endorse burden of various stressors, while beginning to feel better. Was in rehab, home for appx 1.5 weeks. Glad to be there. Continues to endorse stress in home environment, while appreciating assistance from youngest son, specifically. Continues to appreciate presence of good friends who remain close and supportive. Oldest son has a girlfriend and has enjoyed getting to know someone new.     Sleep is improved, going to bed appx 12 and waking appx 8-9 AM. Reports some napping during the day, specifically being worn out from extensive rehab at this time. Appreciates impact of routine, assisted by rehab stay. Does note significant activity restrictions which contribute to continued dissonance from desired routine. Pain remains intrusive, less related to surgery and more to persistent pain in shoulders.     Exam: As above    Recent Labs Reviewed:  Lab on 01/14/2025   Component Date Value    Glucose 01/14/2025 89     BUN 01/14/2025 21     Creatinine 01/14/2025 0.77     Sodium 01/14/2025 138     Potassium 01/14/2025 5.2     Chloride 01/14/2025 100     CO2 01/14/2025 27.6     Calcium 01/14/2025 9.1     Total Protein 01/14/2025 6.6     Albumin 01/14/2025 3.6     ALT (SGPT) 01/14/2025 8     AST (SGOT) 01/14/2025 17     Alkaline Phosphatase 01/14/2025 98     Total Bilirubin  01/14/2025 0.2     Globulin 01/14/2025 3.0     A/G Ratio 01/14/2025 1.2     BUN/Creatinine Ratio 01/14/2025 27.3 (H)     Anion Gap 01/14/2025 10.4     eGFR 01/14/2025 86.3     Magnesium 01/14/2025 2.0     Phosphorus 01/14/2025 4.1     WBC 01/14/2025 6.89     RBC 01/14/2025 3.67 (L)     Hemoglobin 01/14/2025 10.9 (L)     Hematocrit 01/14/2025 35.6     MCV 01/14/2025 97.0     MCH 01/14/2025 29.7     MCHC 01/14/2025 30.6 (L)     RDW 01/14/2025 15.9 (H)     RDW-SD 01/14/2025 57.1 (H)     MPV 01/14/2025 9.4     Platelets 01/14/2025 311     Neutrophil % 01/14/2025 70.7     Lymphocyte % 01/14/2025 18.1 (L)     Monocyte % 01/14/2025 7.5     Eosinophil % 01/14/2025 2.8     Basophil % 01/14/2025 0.6     Immature Grans % 01/14/2025 0.3     Neutrophils, Absolute 01/14/2025 4.87     Lymphocytes, Absolute 01/14/2025 1.25     Monocytes, Absolute 01/14/2025 0.52     Eosinophils, Absolute 01/14/2025 0.19     Basophils, Absolute 01/14/2025 0.04     Immature Grans, Absolute 01/14/2025 0.02     nRBC 01/14/2025 0.0    Labs reviewed    Current Psychotropic Medications:  DUloxetine 90 mg daily  Modafinil 200 mg q AM  Gabapentin 300 mg tid per outside prescriber    Plan of Care/ Medical Decision Making  Continue medications as written, supporting patient and continued dedication to routine, sleep schedule, etc.  Support continued use of medications as written, seeking to maximize pain control and functional abilities.  Continued support offered surrounding grief and loss.  Follow-up scheduled in 8 weeks.    Diagnoses and all orders for this visit:    1. Metastatic breast cancer (Primary)    2. Neoplastic malignant related fatigue  -     modafinil (PROVIGIL) 200 MG tablet; Take 1 tablet by mouth Daily.  Dispense: 30 tablet; Refill: 2    3. Grief    4. Recurrent major depressive disorder, in partial remission    Other orders  -     DULoxetine (Cymbalta) 30 MG capsule; Take 1 capsule by mouth Daily. Take in conjunction with 60 mg q evening.   Dispense: 90 capsule; Refill: 0  -     DULoxetine (Cymbalta) 60 MG capsule; Take 1 capsule by mouth Daily.  Dispense: 90 capsule; Refill: 0    I spent 36 minutes caring for Martha on this date of service. This time includes time spent by me in the following activities: preparing for the visit, reviewing tests, obtaining and/or reviewing a separately obtained history, performing a medically appropriate examination and/or evaluation, counseling and educating the patient/family/caregiver, ordering medications, tests, or procedures, and documenting information in the medical record.

## 2025-02-21 NOTE — PROGRESS NOTES
Chief Complaint  Previous Stage Ib ([id number]) ER/AZ positive, HER-2/peter negative right breast cancer with subsequent metastatic disease identified 10/8/2017, history of right pulmonary embolism, cancer related pain, chemotherapy-induced diarrhea, chemotherapy-induced mucositis, chemotherapy-induced hand-foot syndrome    Subjective        History of Present Illness  The patient returns today in follow-up with laboratory studies, CT scans, bone scan to review continuing on oral Xeloda 1000 mg in the morning, 1500 mg in the evening for 7 days on followed by 7 days off as well as every 3-month Xgeva (last received on 1/14/2025) and Eliquis 5 mg twice daily.  The patient has been maintained on treatment with single agent Xeloda since 2/7/2018.  She does continue with chronic side effects from treatment including hand-foot syndrome and sclerodactyly.  She continues to use emollient cream frequently 4-5 times per day as well as topical triamcinolone intermittently (2 weeks on followed by 2 weeks off as instructed by dermatology).     The patient did have a significant period of time off of oral Xeloda.  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  She stopped Xeloda 1 week prior to surgery.  She resumed Xeloda on 1/14/2025 at previous dosing schedule.  She required a significant stay in rehab, is now back at home and preparing to begin physical therapy.  She was just in to see Dr. Lopez on 2/17/2025 and is recovering as anticipated.  She reports taking Percocet 7.5/325 every 8 hours as needed.  She is currently not taking anything for constipation, notes mild constipation from narcotics.  We discussed today the need for her to be on a bowel regimen.  She is having some additional musculoskeletal pain in her right hip and right shoulder, relying on these areas for her limited current mobility with chronic difficulty from her right shoulder  "related to prior rotator cuff issues.  She does note recent recurrence of dysuria symptoms and is concerned that she may have a urinary tract infection again.  Appetite is stable, weight is stable at 180 pounds.  She is currently in a wheelchair with boot in place in her left foot.      Objective   Vital Signs:   /82   Pulse 93   Temp 97.7 °F (36.5 °C) (Temporal)   Resp 16   Ht 152.4 cm (60\")   Wt 81.8 kg (180 lb 6.4 oz)   SpO2 95%   BMI 35.23 kg/m²     Physical Exam  Constitutional:       Appearance: She is well-developed.      Comments: Patient is in a wheelchair today   Eyes:      Conjunctiva/sclera: Conjunctivae normal.   Neck:      Thyroid: No thyromegaly.   Cardiovascular:      Rate and Rhythm: Normal rate and regular rhythm.      Heart sounds: No murmur heard.     No friction rub. No gallop.   Pulmonary:      Effort: No respiratory distress.      Breath sounds: Normal breath sounds.   Abdominal:      General: Bowel sounds are normal. There is no distension.      Palpations: Abdomen is soft.      Tenderness: There is no abdominal tenderness.   Musculoskeletal:      Comments: Boot in place left lower extremity   Lymphadenopathy:      Head:      Right side of head: No submandibular adenopathy.      Cervical: No cervical adenopathy.      Upper Body:      Right upper body: No supraclavicular adenopathy.      Left upper body: No supraclavicular adenopathy.   Skin:     General: Skin is warm and dry.      Findings: Rash present.      Comments: Overall the patient's hands have improved compared to prior exam.  The degree of erythema in the palms and dorsal aspect of her hands has diminished.  The degree of sclerodactyly has diminished, mobility in her fingers has improved.  She does have significant dry skin in her palms with some skin cracking, no significant peeling.   Neurological:      Mental Status: She is alert and oriented to person, place, and time.      Cranial Nerves: No cranial nerve deficit. "      Motor: No abnormal muscle tone.      Deep Tendon Reflexes: Reflexes normal.   Psychiatric:         Behavior: Behavior normal.           Result Review : Reviewed CBC, CMP from today.  Reviewed bone scan and CT chest abdomen pelvis from 2/14/2025.     Assessment and Plan     *Previous Stage Ib ([id number]) right breast cancer:  Diagnosed May 2010 with excisional biopsy for invasive ductal carcinoma, 1.3 cm, grade 2, ER 90%, NY 80%, HER-2/peter negative (1+ IHC).    Subsequent right mastectomy in July 2010 with no residual breast malignancy, 1/5 sentinel lymph node with micrometastasis (0.25 mm).    Treated in the [facility] system with adjuvant AC ×4 cycles in 2010 (no taxanes administered due to underlying Charcot-Saloni-Tooth with peripheral neuropathy).    Adjuvant Femara (postmenopausal) initiated October 2010 with plan to treat ×10 years.    Genetic testing reportedly negative.    Developed osteopenia treated with Prolia beginning 2/27/13. Subsequently discontinued due to identification of metastatic disease.    *Recurrent/metastatic disease identified 10/8/17:  Disease involving thoracic spine with cord compression at T6, lumbosacral involvement, sternal and right sternoclavicular involvement.    Femara discontinued in 10/2017.    Radiation administered (in the [facility] system) to the thoracic spine beginning 10/19/17 treating T3-T9 to a dose of 24 gray in 6 fractions.  Evaluation with MRI 12/8/17 showing persistent T6 cord compression with persistent neurologic compromise requiring surgical treatment 12/11/17 with T6 laminectomy/corpectomy and T3-T9 fusion.  Pathology with metastatic carcinoma of breast origin, ER negative, NY negative, HER-2/peter negative (1+ IHC).  Additional staging evaluation 12/8/17 with no evidence of visceral metastatic disease, bone scan showing involvement of thoracic spine, sternum, left humerus, mid frontal bone.  No plane film correlate of left humerus lesion.  MRI lumbar spine with  small intradural L3 metastasis.  CA 15-3 12/6/17- 17.  Palliative radiation therapy to L3 dural metastasis and left humerus initiated 1/15/18 (10 fractions), completed 1/26/18.  Hypercalcemia of malignancy with calcium in the 10-11 range.  Initiation of monthly Xgeva 1/23/18.  Baseline CT scan 1/30/18 with no evidence of visceral involvement.  Cluster of nodular opacities in the right lower lobe suspected to be infectious or related to bronchiolitis. Bone scan 1/30/18 showed postsurgical change in the thoracic spine, stable uptake in the frontal bone, no new areas of disease.  Initiation of palliative oral single agent Xeloda 2/7/18 2000 mg a.m., 1500 mg p.m. for 14/21 days.   Following 3 cycles xeloda, bone scan 4/4/18 showed no change from the prior study.  CT scan 4/4/18 showed a small pericardial effusion of unclear significance as well as a subcutaneous nodule in the right lateral chest wall.  Subsequent evaluation with echocardiogram 4/17/18 showed no evidence of pericardial effusion.  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  We discussed the possibility of surgical excision of the right subcutaneous chest wall lesion for more definitive diagnosis.  Reviewed previous CT images dating back to 12/8/17 and the lesion was present even at that time measuring around 1.7 cm although not commented on in the radiology report.  As this appears to be an indolent low-grade process unrelated to her breast cancer, recommendee foregoing surgical excision at this time and monitoring this area on future scans.  The patient agreed.    Following 6 cycles of Xeloda, CT 6/6/18  showed stable findings, no evidence of progressive disease.  There was a comment regarding subcutaneous abnormality in the anterior abdominal wall and this was related to Lovenox injection sites.  Bone scan  6/6/18 showed no interval change.   CT scan and bone scan 8/13/18 following 9 cycles of Xeloda showed stable findings with no evidence of significant visceral metastases.  Her bone lesions appear stable on bone scan.  The spindle cell neoplasm in her right chest wall actually decreased in size from 2 cm down to 1.6 cm.    The patient experienced some symptoms of diarrhea, anorexia, generalized weakness during cycle 9 Xeloda it was unclear whether this was related to a viral gastroenteritis or toxicity from treatment.  Symptoms recurred during cycle 10 and treatment was cut short by 2 days.  Symptoms attributed to Xeloda.  With cycle 11, dose and schedule altered to 1500 mg twice daily for 7 days on followed by 7 days off .  CT scan 9/9/2020 with no significant changes.  Bone scan 9/9/2020 read as unchanged from prior studies however did note an area of slight activity in the medial left femur.  Contacted radiology and although this was not noted on prior reports appears to have been present.  Subsequent MRI left femur 9/21/2020 with cortical thickening and periosteal edema left iliac us muscle insertion to the medial left femur with no evidence of metastatic disease, favored to represent periosteal change secondary to insertional tendinitis.  Severe hand-foot symptoms causing sclerodactyly and limitation in finger movement prompted change in dosing in July 2021 with Xeloda decreased to 1000 mg a.m., 1500 mg p.m. for 7 days on followed by 7 days off.  Patient was experiencing severe hand-foot syndrome related to Xeloda having developed skin peeling, sclerodactyly with limited use of her hands.  On 3/31/2022, Xeloda was held to allow for recovery.  Patient resumed Xeloda on 4/18/2022.  Patient required hospitalization 5/29 - 6/1/2022 with presumed viral gastroenteritis that was exacerbated by Xeloda.  Xeloda held briefly, resumed on 6/6/2022.  Patient again with worsening sclerodactyly, Xeloda held for 2 weeks from  10/3 - 10/17/2022, resumed at prior dose with improvement in symptoms.  Scans on 3/3/2023 with CT chest abdomen pelvis and bone scan showing stable findings.  CT chest abdomen pelvis 7/3/2023 with questionable 1 mm change in size right lower lobe nodule.  Additional small pulmonary nodules stable.  Stable bony metastatic disease.  Bone scan on 7/7/2023 however showed slight progression focal involvement right ischium and superior pubic ramus (new ischial lesion superior pubic ramus compared to 10/24/2022 and more conspicuous compared to 3/3/2023).  Metastatic lesion right inferior pubic ramus and ischial tuberosity increased in size since October 2022 however stable from 3/3/2023.  MRI cervical spine 7/3/2023 with no metastatic involvement however identification of the lesion at T2, recommended dedicated thoracic spine MRI to evaluate further.  Given the minimal extent and slow degree of progression, elected to continue oral Xeloda and evaluate further with MRI pelvis and thoracic spine.  Consideration of biopsy pelvic metastasis for repeat ER/NY/HER2/peter testing.    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  CA 15-3 on 7/10/2023 was 12.2, uninformative.  MRI thoracic spine 7/27/2023 with 1 cm metastatic focus seen in L1  MRI pelvis 7/27/2023 with metastatic foci identified right superior pubic ramus, right ischial tuberosity, inferior pubic ramus, L5 body.  Chronic appearing posttraumatic and/or degenerative change in the posterior right ilium adjacent SI joint.  CT-guided biopsy right pubic ramus lesion on 8/31/2023.  Pathology showed no evidence of malignancy, showed markedly calcified and sclerotic mature lamellar bone.  Attempted decalcification but no evidence of malignancy.  CT chest abdomen pelvis 9/8/2023 and bone scan 9/11/2023 with stable findings.  CT chest abdomen pelvis 12/8/2023 with possible slight increase in right chest wall subcutaneous  lesion (1.8 x 1.1 up to 1.8 x 1.4 cm) although may be related to altered positioning.  Nonpathologically enlarged right axillary and subpectoral lymph nodes slightly increased (patient notes she received RSV vaccine right arm just prior to scan).  Bone scan 12/11/2023 with stable findings.  CT chest abdomen pelvis 3/19/2024 with slight interval decrease in size of right axillary and bilateral subpectoral lymph nodes, right subcutaneous soft tissue nodule slightly smaller, stable pulmonary nodules, stable bone metastases.  Bone scan 3/19/2024 with stable findings.  CT chest abdomen pelvis 6/21/2024 showed left subpectoral lymph node to have increased slightly in size (0.9 x 0.6 up to 1.3 x 0.8 cm).  Subcutaneous nodule right chest wall decreased from 1.1 x 1.9 down to 1.1 x 1.4 cm.  Scan otherwise stable.  Bone scan 6/21/2024 with stable findings  CT chest abdomen pelvis 9/23/2024 with resolution of left subpectoral lymphadenopathy, additional findings stable.  Bone scan 9/23/2024 with stable findings.  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  She stopped Xeloda 1 week prior to surgery.  She resumed Xeloda on 1/14/2025 at previous dosing schedule  CT chest abdomen pelvis 2/14/2025 showed stable findings and bone, no evidence of visceral disease.  Bone scan with increased activity right L5/S1 disc space felt to be degenerative with unchanged CT appearance of the vertebral bodies.  Postoperative activity noted left midfoot.  Previous sites of disease activity stable.  The patient returns today continuing on treatment with Xeloda 1000 mg a.m., 1500 mg p.m. 7 days on followed by 7 days off.  She is continuing on Xgeva every 3 months, last received on 1/14/2025.  She has been on single agent Xeloda since 2/7/2018.  The patient recently had a period of time off treatment for her left foot surgery.  She held Xeloda 1 week prior to surgery that  was performed on 12/10/2024.  She resumed treatment on 1/14/2025.  She has continued to tolerate treatment extremely well, hand-foot syndrome has improved with her time off of treatment in terms of the degree of sclerodactyly in her fingers and degree of erythema.  She was encouraged to continue using emollient cream frequently in addition to triamcinolone prescribed by dermatology 2 weeks on followed by 2 weeks off.  We reviewed her scans as noted above from 2/14/2025.  CT scans showed stable findings.  Bone scan did show some new activity around L5/S1 however this was felt to represent degenerative disc disease as the vertebral bodies appeared stable on CT in this area.  We will certainly follow this up on future scan.  She is asymptomatic in that area.  Previous sites of disease appeared stable and bone on both CT and bone scan.  Therefore we discussed continuation of treatment today.  Patient will be undergoing a course of physical therapy and recovery from her foot surgery, continues with a boot in place and is in a wheelchair today.  She returned on 4/14/2025 and she is due for Xgeva for NP visit.  I will see her back for further follow-up in mid May with repeat labs to review.  Pending her overall status we will discuss timeframe for follow-up scans, likely at least a 4-month interval from current studies.    *Right pulmonary embolism:  Diagnosed on CT angiogram 10/21/17 involving small right lower lobe pulmonary artery.  Lower extremity Dopplers negative.  Bilateral lower extremity Dopplers negative again 12/5/17.  Received chronic Lovenox 1 mg/kg twice per day, transitioned to oral Eliquis in February 2019, continuing on Eliquis 5 mg twice daily.  Patient continues on Eliquis 5 mg twice daily    *Cancer related pain:  Previously receiving Duragesic 50 µg patch every 72 hours along with Dilaudid 4 mg as needed for breakthrough pain  The patient's pain improved over time and she was able to discontinue both  Duragesic and Dilaudid in the interval.  Patient does take occasional Flexeril at bedtime due to back spasm/pain when she has been more active.  The patient does have some occasional aggravation of her chronic back pain and does use tramadol 50 mg every 8 hours as needed  Patient was receiving tramadol 50 mg every 8-12 hours as needed in addition to hydrocodone 5/325 every 4 hours as needed.  She was also taking OTC NSAIDs.  Patient developed nausea related to hydrocodone and was transitioned to Percocet 5/325 every 4 hours as needed, advised to stop taking NSAIDs with ongoing anticoagulation.  Patient has chronic pain in her shoulders that does wax and wane is unrelated to her malignancy.  She notes that right shoulder pain has been exacerbated following her left foot surgery and she has relied on her arms for transfers and to assist with mobility.  She also has developed some mild pain around her right hip likely related to favoring the right leg when transferring or attempting to ambulate.  She will continue to work with physical therapy.  She was previously receiving tramadol 50 mg every 8 hours and Percocet 5/325 every 4 hours as needed.  She currently has Percocet 7.5/325 prescribed by her PCP which she has been taking intermittently postoperatively.    *Chemotherapy-induced diarrhea with subsequent C. difficile colitis in the setting of previous ulcerative colitis and then identification of enteropathogenic E. coli:  Patient with reported history of ulcerative colitis, continues on mesalamine.  The patient developed initial diarrhea related to Xeloda at regular dosing.  Symptoms improved on reduced dose Xeloda  Flare of symptoms in October 2018 with apparent finding of C. difficile colitis by GI, treated with course of oral vancomycin with improvement in symptoms.  Patient notes minimal intermittent diarrhea/loose stools on Xeloda requiring occasional dosing of Imodium.    Patient required hospitalization  5/29 - 6/1/2022 with presumed viral gastroenteritis that was exacerbated by Xeloda.  Xeloda held briefly, resumed on 6/6/2022.  Patient's  developed C. difficile colitis and patient was experiencing increase in diarrhea.  Stool studies performed 8/4/22 negative C. difficile however GI PCR was positive for enteropathogenic E. coli.  Given patient's symptoms and immunocompromise status it was elected to treat her with azithromycin 500 mg daily x3 days beginning 8/5/2022  Diarrhea resolved  Patient underwent colonoscopy on 2/28/2023 with findings of diverticulosis  Patient had been experiencing intermittent episodes of diarrhea however is now constipated related to narcotics.  We discussed starting that regimen with Senokot 1 to 2 tablets twice daily and adding MiraLAX daily if needed.    *Traumatic left tibia/fibular fracture:  Status post ORIF 12/6/17  Specimen was sent for pathologic review, negative for evidence of malignancy    *Hypercalcemia:  Suspect hypercalcemia of malignancy, calcium in  10-11 range previously.  Calcium normalized following initiation of monthly Xgeva on 1/23/18.    *Chemotherapy-induced mucositis:  Patient has not experienced any recent difficulty with mucositis.    *Recurrent UTI, bladder wall thickening on CT:  Patient had an enterococcal UTI on 3/2/18 sensitive to nitrofurantoin and received treatment, unclear how long.  Recurrent UTI 3/20/18 with urine culture growing Klebsiella, initially treated with nitrofurantoin, transitioned to Levaquin.  CT 4/4/18 with diffuse bladder wall thickening with increased nodular thickening at the left base.  Referral to urogynecology Dr. May Johnson.  She was placed on a prophylactic dose of trimethoprim 100 mg daily, bladder wall thickening felt to be related to recent recurrent urinary tract infections.  Patient with urinary symptoms, treated with course of Macrobid at the end of December 2018, urine culture however was negative  12/31/18.  Patient was found to have Klebsiella UTI 7/29/2019 which was successfully treated with Macrobid with complete resolution of symptoms.  CT 8/10/2021 with diffuse bladder wall thickening new from 5/17/2021 (however seen on multiple prior scans).    UTI on 10/18/2021 with E. coli greater than 100,000 colonies that was pansensitive, treated with Macrobid x7 days  With ongoing/recurrent UTIs patient was seen by urogynecology and initiated suppressive therapy with trimethoprim 100 mg daily in December 2021.  She has not experienced any further urinary tract infections.  CT abdomen pelvis 3/28/2022 does show diffuse thickening of urinary bladder as has been seen on prior studies indicative of cystitis.  Patient notes that she did stop taking trimethoprim on a daily basis.    Patient with urine culture on 5/5/2023 that grew E. coli and she received antibiotic treatment from urogynecology.    Urine culture on 8/23/2023 with greater than 100,000 colonies of E. coli resistant to ampicillin and Bactrim.  Received 5-day course of Cipro with resolution of symptoms.  Patient did have UTI with E. coli on culture 10/2/2023, received a course of Augmentin.  Patient with ongoing intermittent urinary tract infections.  Patient reports recent development of dysuria again and we will check urinalysis and culture today    *Charcot-Saloni-Tooth with mobility difficulties:  The patient underwent an intensive course of rehabilitation at [facility].  She graduated from her outpatient course November 2018.  Overall the patient has improved dramatically in terms of mobility,   Patient developed significant callus formation lateral aspect of the left plantar surface.    Patient developed skin erosion and an ulceration on the lateral aspect of her left foot.    Patient required surgery on her left foot by Dr. Lopez on 12/10/2024  Patient is continuing to recover from her left foot surgery in December.  She is currently in a boot,  remains in a wheelchair.  She is starting physical therapy.    *Depression:  The patient is continuing follow-up in the supportive oncology clinic and is currently continuing on Cymbalta 30 mg twice daily as well as gabapentin 900 mg nightly, Ativan 0.5 mg nightly as needed, Provigil 100 mg daily.  Patient does continue to attend support groups at [facility].  The patient does continue routine follow-up in supportive oncology clinic.    Patient does have multiple stressors with her 's malignancy and chemotherapy as well as her autistic son who is living with them at home.  Patient continues to deal with grief related to her 's death.  She has seen a grief counselor through hospice which helped significantly.  She continues routine follow-up with supportive oncology as well.    *Hand-foot syndrome secondary to Xeloda:  Patient continues with frequent application of emollient cream to the hands and feet  Symptoms increased significantly requiring a 1 week delay in cycle 18 Xeloda as noted above.  Symptoms did improve and she continued on the same dose.    Patient was referred to dermatology and has been continuing on triamcinolone 0.1% cream used for 1 week on followed by 1 week off which led to some further improvement.   Progressive palmar erythema with development of sclerodactyly and effect on dexterity.  Addition of urea-based cream 3 times daily.  Patient with continued difficulty regarding erythema of her hands that extended onto the dorsal aspect and was causing contractures/sclerodactyly in her fingers affecting her dexterity.  In July 2021, held Xeloda for an additional week and then reduced dose from 1500 mg twice daily down to 1000 mg a.m. and 1500 mg p.m. and continued on a 7-day on followed by 7-day off schedule.  With reduction in Xeloda dose, slight improvement in erythema involving dorsal aspect of the hands and slight decrease in sclerodactyly  Patient was experiencing severe  hand-foot syndrome related to Xeloda having developed skin peeling, sclerodactyly with limited use of her hands.  On 3/31/2022, Xeloda was held to allow for recovery.  Patient resumed Xeloda on 4/18/2022.  Patient developed worsening sclerodactyly affecting dexterity that required Xeloda to again be briefly held for 2 weeks from 10/3 - 10/17/2022.  Treatment resumed at prior dose after improvement in symptoms.  Patient continues to use emollient cream frequently and topical triamcinolone 0.1% twice daily intermittently (2 weeks on followed by 2 weeks off as instructed by dermatology).  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  The degree of erythema on the dorsal aspect of the hand has diminished.  She does have some degree of dry skin and skin cracking in her palms and was encouraged to continue using her emollient cream frequently.    *Evidence of steatosis on scans with mild intermittent elevated liver function studies:  Liver function studies increased 8/20/19 with ALT 98, AST 70, normal total bilirubin.  Negative viral hepatitis A, B, and C panel 8/23/18  Likely related to hepatic steatosis.   Subsequent improvement in LFTs  LFTs today are normal    *Chemotherapy induced leukopenia:  WBC today is normal at 8.7    *GERD, question of celiac disease:  Patient with significant history of reflux  Patient currently receiving Protonix 40 mg daily daily and Carafate 1 g 4 times daily as needed  Patient required hospitalization 5/29 - 6/1/2022 with presumed viral gastroenteritis that was exacerbated by Xeloda.    EGD performed 5/31/2022 during hospitalization with biopsy that showed focal blunting of villi and atrophy with slight increase in intraepithelial lymphocytes.  Changes were minimal but recommended correlation with serology to exclude celiac disease.  Celiac serology 8/8/2022 negative  Patient  previously developed worsening reflux symptoms, temporarily increase Protonix to 40 mg twice daily and we did add Carafate 1 g 4 times daily.    She is taking Protonix 40 mg once daily, is not taking Carafate.    *Insomnia:  Patient with prior paradoxical reaction to Benadryl  Improved previously on temazepam 15 mg nightly as needed.   Patient noted subsequently that temazepam was having no effect.   Patient continuing on gabapentin 900 mg nightly    *Health maintenance:  Patient notes that she has a history of colon polyps as well as ulcerative colitis and was due for a follow-up colonoscopy on 9/12/2019.  We did discuss there is no necessity to pursue colonoscopy in the setting of her metastatic breast cancer.    The patient did undergo colonoscopy on 2/7/2020 with findings of muscular hypertrophy and diverticulosis.   Patient did wish to proceed with further colonoscopic evaluation, performed on 12/20/2022 with poor prep.  Repeat 2/28/2023 with diverticulosis.    *Bilateral shoulder pain:  Chronic difficulty with right shoulder although she has not complained of this in prior visits to our office.  She reported difficulty with abduction.    The patient did undergo MRI of her right shoulder on 11/21/2019 at an outside facility showing multiple abnormalities including supraspinatus tendinosis, labral tear but no evidence of metastatic disease.  Patient developed pain in the left shoulder, was seen by orthopedics and underwent steroid injection with some improvement.  Right shoulder stable.  Patient did undergo physical therapy with some improvement.  She continues to use tramadol 50 mg every 8-12 hours as needed.  Note that current CT 10/20/2022 made notation of left shoulder probable bursitis.    Patient continues with bilateral shoulder pain which does wax and wane.  Recently right shoulder pain has been more problematic, exacerbated by using her arms to assist with transfers and ability after foot  surgery    *Ocular changes in part related to Xeloda:  Patient experienced a mild degree of blurred vision as well as burning and pruritus.  She was seen by her ophthalmologist and was placed on xiidra ophthalmic drops which have helped.  Likely both issues are to some extent related to Xeloda.  Symptoms did worsen and patient was seen by ophthalmologist at [facility].  She is now using an ocular lubricant at night in addition to artificial tears which have helped.  Symptoms currently stable to improved    *Elevated glucose:  It is noted the patient's glucose at the last few visits has been in the high 100 range, postprandial.  She has had a hemoglobin A1c that has been in the high 5-6 range in the past  Hemoglobin A1c was last checked on 12/3/2024 at 5.8    *Possible loosening of pedicle screw at T3:  CT cervical spine 5/17/2021 showed loosening of the left pedicle screw at T3.  Patient referred to orthopedics and was seen by Dr. Nayak who felt that there were no concerning findings on the scan    *Iron deficiency anemia  Labs on 5/2/2022 with hemoglobin 10.8.  Additional labs with iron 48, ferritin 21.2, iron saturation 11%, TIBC 4 and 32, folate greater than 20, B12 greater than 2000.    Attempted treatment with oral iron daily however patient was intolerant  Patient subsequently received Venofer 300 mg weekly x4 beginning 6/6/2022 and completed on 6/27/2022  Subsequent iron studies on 7/11/2022 showed improvement with iron 62, ferritin 345, iron saturation 18%, TIBC 336.  Hemoglobin had improved up to 12.7 at that time.  Repeat iron studies on 10/3/2022 showed iron replete status with hemoglobin 13.7, iron 121, ferritin 208.7, iron saturation 31%, TIBC 392.  Hemoglobin currently 11.7.  Hemoglobin is improving since time of ankle surgery.  If hemoglobin does not normalize we will recheck anemia evaluation     Plan:  Continue palliative single agent Xeloda 1000 mg a.m., 1500 mg in the p.m. 7 days on  followed by 7 days off (patient has been on single agent Xeloda since 2/7/2018)  Patient continues on every 3-month Xgeva last administered 1/14/2025 and due next on 4/14/2025  Continue Eliquis 5 mg twice daily  The patient will continue frequent use of emollient cream currently 5 times daily and continue periodic triamcinolone cream 0.1% twice daily (provided by dermatology) 2 weeks on followed by 2 weeks off as prescribed  Continue Protonix 40 mg daily  Continue ocular lubricating gel nightly per ophthalmology  Continue Provigil 100 mg daily and Cymbalta 30 mg twice daily.  Patient continues routine follow-up with supportive oncology   Patient will continue gabapentin 900 mg at night.   Patient continues on tramadol 50 mg every 8 hours as needed for pain  Continue Percocet 7.5/325 every 8 hours as needed for pain  Patient advised to begin bowel regimen with Senokot 1 to 2 tablets daily and add MiraLAX daily as needed with ongoing narcotic use  Additional labs today with urinalysis and urine culture  MD visit on 4/14/2025 with CBC, CMP, magnesium, phosphorus.  Patient will be due for 3-month dose of Xgeva  MD visit in mid May 2025 with CBC, CMP.  We will discuss timeframe for follow-up scans.     Patient continuing on high risk medication requiring intensive monitoring.       I did spend 50 minutes in total time caring for the patient today, time spent in review of records, face-to-face consultation, coordination of care, placement of orders, completion of documentation.

## 2025-02-24 ENCOUNTER — LAB (OUTPATIENT)
Dept: OTHER | Facility: HOSPITAL | Age: 65
End: 2025-02-24
Payer: MEDICARE

## 2025-02-24 ENCOUNTER — OFFICE VISIT (OUTPATIENT)
Dept: ONCOLOGY | Facility: CLINIC | Age: 65
End: 2025-02-24
Payer: MEDICARE

## 2025-02-24 VITALS
HEART RATE: 93 BPM | DIASTOLIC BLOOD PRESSURE: 82 MMHG | TEMPERATURE: 97.7 F | OXYGEN SATURATION: 95 % | HEIGHT: 60 IN | RESPIRATION RATE: 16 BRPM | BODY MASS INDEX: 35.42 KG/M2 | WEIGHT: 180.4 LBS | SYSTOLIC BLOOD PRESSURE: 147 MMHG

## 2025-02-24 DIAGNOSIS — C50.811 MALIGNANT NEOPLASM OF OVERLAPPING SITES OF RIGHT BREAST IN FEMALE, ESTROGEN RECEPTOR NEGATIVE: Primary | ICD-10-CM

## 2025-02-24 DIAGNOSIS — C79.51 MALIGNANT NEOPLASM METASTATIC TO BONE: ICD-10-CM

## 2025-02-24 DIAGNOSIS — Z17.1 MALIGNANT NEOPLASM OF OVERLAPPING SITES OF RIGHT BREAST IN FEMALE, ESTROGEN RECEPTOR NEGATIVE: ICD-10-CM

## 2025-02-24 DIAGNOSIS — R30.0 DYSURIA: ICD-10-CM

## 2025-02-24 DIAGNOSIS — Z17.1 MALIGNANT NEOPLASM OF OVERLAPPING SITES OF RIGHT BREAST IN FEMALE, ESTROGEN RECEPTOR NEGATIVE: Primary | ICD-10-CM

## 2025-02-24 DIAGNOSIS — C50.811 MALIGNANT NEOPLASM OF OVERLAPPING SITES OF RIGHT BREAST IN FEMALE, ESTROGEN RECEPTOR NEGATIVE: ICD-10-CM

## 2025-02-24 LAB
ALBUMIN SERPL-MCNC: 3.7 G/DL (ref 3.5–5.2)
ALBUMIN/GLOB SERPL: 1.1 G/DL
ALP SERPL-CCNC: 77 U/L (ref 39–117)
ALT SERPL W P-5'-P-CCNC: 9 U/L (ref 1–33)
ANION GAP SERPL CALCULATED.3IONS-SCNC: 8.4 MMOL/L (ref 5–15)
AST SERPL-CCNC: 17 U/L (ref 1–32)
BACTERIA UR QL AUTO: ABNORMAL /HPF
BASOPHILS # BLD AUTO: 0.06 10*3/MM3 (ref 0–0.2)
BASOPHILS NFR BLD AUTO: 0.7 % (ref 0–1.5)
BILIRUB SERPL-MCNC: 0.3 MG/DL (ref 0–1.2)
BILIRUB UR QL STRIP: ABNORMAL
BUN SERPL-MCNC: 19 MG/DL (ref 8–23)
BUN/CREAT SERPL: 23.5 (ref 7–25)
CALCIUM SPEC-SCNC: 9.3 MG/DL (ref 8.6–10.5)
CHLORIDE SERPL-SCNC: 98 MMOL/L (ref 98–107)
CLARITY UR: ABNORMAL
CO2 SERPL-SCNC: 30.6 MMOL/L (ref 22–29)
COLOR UR: YELLOW
CREAT SERPL-MCNC: 0.81 MG/DL (ref 0.57–1)
DEPRECATED RDW RBC AUTO: 64.1 FL (ref 37–54)
EGFRCR SERPLBLD CKD-EPI 2021: 81.2 ML/MIN/1.73
EOSINOPHIL # BLD AUTO: 0.09 10*3/MM3 (ref 0–0.4)
EOSINOPHIL NFR BLD AUTO: 1 % (ref 0.3–6.2)
ERYTHROCYTE [DISTWIDTH] IN BLOOD BY AUTOMATED COUNT: 19.4 % (ref 12.3–15.4)
GLOBULIN UR ELPH-MCNC: 3.4 GM/DL
GLUCOSE SERPL-MCNC: 121 MG/DL (ref 65–99)
GLUCOSE UR STRIP-MCNC: NEGATIVE MG/DL
HCT VFR BLD AUTO: 38.1 % (ref 34–46.6)
HGB BLD-MCNC: 11.7 G/DL (ref 12–15.9)
HGB UR QL STRIP.AUTO: ABNORMAL
HYALINE CASTS UR QL AUTO: ABNORMAL /LPF
IMM GRANULOCYTES # BLD AUTO: 0.02 10*3/MM3 (ref 0–0.05)
IMM GRANULOCYTES NFR BLD AUTO: 0.2 % (ref 0–0.5)
KETONES UR QL STRIP: ABNORMAL
LEUKOCYTE ESTERASE UR QL STRIP.AUTO: ABNORMAL
LYMPHOCYTES # BLD AUTO: 1.19 10*3/MM3 (ref 0.7–3.1)
LYMPHOCYTES NFR BLD AUTO: 13.7 % (ref 19.6–45.3)
MCH RBC QN AUTO: 28.9 PG (ref 26.6–33)
MCHC RBC AUTO-ENTMCNC: 30.7 G/DL (ref 31.5–35.7)
MCV RBC AUTO: 94.1 FL (ref 79–97)
MONOCYTES # BLD AUTO: 1.03 10*3/MM3 (ref 0.1–0.9)
MONOCYTES NFR BLD AUTO: 11.8 % (ref 5–12)
NEUTROPHILS NFR BLD AUTO: 6.31 10*3/MM3 (ref 1.7–7)
NEUTROPHILS NFR BLD AUTO: 72.6 % (ref 42.7–76)
NITRITE UR QL STRIP: POSITIVE
NRBC BLD AUTO-RTO: 0 /100 WBC (ref 0–0.2)
PH UR STRIP.AUTO: 5.5 [PH] (ref 5–8)
PLATELET # BLD AUTO: 226 10*3/MM3 (ref 140–450)
PMV BLD AUTO: 10.9 FL (ref 6–12)
POTASSIUM SERPL-SCNC: 4.4 MMOL/L (ref 3.5–5.2)
PROT SERPL-MCNC: 7.1 G/DL (ref 6–8.5)
PROT UR QL STRIP: ABNORMAL
RBC # BLD AUTO: 4.05 10*6/MM3 (ref 3.77–5.28)
RBC # UR STRIP: ABNORMAL /HPF
REF LAB TEST METHOD: ABNORMAL
SODIUM SERPL-SCNC: 137 MMOL/L (ref 136–145)
SP GR UR STRIP: 1.02 (ref 1–1.03)
SQUAMOUS #/AREA URNS HPF: ABNORMAL /HPF
UROBILINOGEN UR QL STRIP: ABNORMAL
WBC # UR STRIP: ABNORMAL /HPF
WBC NRBC COR # BLD AUTO: 8.7 10*3/MM3 (ref 3.4–10.8)
YEAST URNS QL MICRO: PRESENT /HPF

## 2025-02-24 PROCEDURE — 81001 URINALYSIS AUTO W/SCOPE: CPT | Performed by: INTERNAL MEDICINE

## 2025-02-24 PROCEDURE — 99215 OFFICE O/P EST HI 40 MIN: CPT | Performed by: INTERNAL MEDICINE

## 2025-02-24 PROCEDURE — 36415 COLL VENOUS BLD VENIPUNCTURE: CPT

## 2025-02-24 PROCEDURE — 87088 URINE BACTERIA CULTURE: CPT | Performed by: INTERNAL MEDICINE

## 2025-02-24 PROCEDURE — 1159F MED LIST DOCD IN RCRD: CPT | Performed by: INTERNAL MEDICINE

## 2025-02-24 PROCEDURE — 80053 COMPREHEN METABOLIC PANEL: CPT | Performed by: INTERNAL MEDICINE

## 2025-02-24 PROCEDURE — 1125F AMNT PAIN NOTED PAIN PRSNT: CPT | Performed by: INTERNAL MEDICINE

## 2025-02-24 PROCEDURE — 87186 SC STD MICRODIL/AGAR DIL: CPT | Performed by: INTERNAL MEDICINE

## 2025-02-24 PROCEDURE — 3079F DIAST BP 80-89 MM HG: CPT | Performed by: INTERNAL MEDICINE

## 2025-02-24 PROCEDURE — 85025 COMPLETE CBC W/AUTO DIFF WBC: CPT | Performed by: INTERNAL MEDICINE

## 2025-02-24 PROCEDURE — 87086 URINE CULTURE/COLONY COUNT: CPT | Performed by: INTERNAL MEDICINE

## 2025-02-24 PROCEDURE — 3077F SYST BP >= 140 MM HG: CPT | Performed by: INTERNAL MEDICINE

## 2025-02-24 PROCEDURE — 1160F RVW MEDS BY RX/DR IN RCRD: CPT | Performed by: INTERNAL MEDICINE

## 2025-02-24 NOTE — LETTER
February 24, 2025     Jazmine Chanel MD  [address] [address]    Patient: Martah Davey   YOB: 1960   Date of Visit: 2/24/2025     Dear Jazmine Chanel MD:       Thank you for referring Martha Davey to me for evaluation. Below are the relevant portions of my assessment and plan of care.    If you have questions, please do not hesitate to call me. I look forward to following Martha along with you.         Sincerely,        Ubaldo Hancock MD        CC: No Recipients    Ubaldo Hancock Jr., MD  02/24/25 [id number]  Sign when Signing Visit  Chief Complaint  Previous Stage Ib ([id number]) ER/OK positive, HER-2/peter negative right breast cancer with subsequent metastatic disease identified 10/8/2017, history of right pulmonary embolism, cancer related pain, chemotherapy-induced diarrhea, chemotherapy-induced mucositis, chemotherapy-induced hand-foot syndrome    Subjective       History of Present Illness  The patient returns today in follow-up with laboratory studies, CT scans, bone scan to review continuing on oral Xeloda 1000 mg in the morning, 1500 mg in the evening for 7 days on followed by 7 days off as well as every 3-month Xgeva (last received on 1/14/2025) and Eliquis 5 mg twice daily.  The patient has been maintained on treatment with single agent Xeloda since 2/7/2018.  She does continue with chronic side effects from treatment including hand-foot syndrome and sclerodactyly.  She continues to use emollient cream frequently 4-5 times per day as well as topical triamcinolone intermittently (2 weeks on followed by 2 weeks off as instructed by dermatology).     The patient did have a significant period of time off of oral Xeloda.  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  She stopped Xeloda 1 week prior to surgery.  She resumed Xeloda on 1/14/2025 at previous dosing schedule.  She required a significant  "stay in rehab, is now back at home and preparing to begin physical therapy.  She was just in to see Dr. Lopez on 2/17/2025 and is recovering as anticipated.  She reports taking Percocet 7.5/325 every 8 hours as needed.  She is currently not taking anything for constipation, notes mild constipation from narcotics.  We discussed today the need for her to be on a bowel regimen.  She is having some additional musculoskeletal pain in her right hip and right shoulder, relying on these areas for her limited current mobility with chronic difficulty from her right shoulder related to prior rotator cuff issues.  She does note recent recurrence of dysuria symptoms and is concerned that she may have a urinary tract infection again.  Appetite is stable, weight is stable at 180 pounds.  She is currently in a wheelchair with boot in place in her left foot.      Objective  Vital Signs:   /82   Pulse 93   Temp 97.7 °F (36.5 °C) (Temporal)   Resp 16   Ht 152.4 cm (60\")   Wt 81.8 kg (180 lb 6.4 oz)   SpO2 95%   BMI 35.23 kg/m²     Physical Exam  Constitutional:       Appearance: She is well-developed.      Comments: Patient is in a wheelchair today   Eyes:      Conjunctiva/sclera: Conjunctivae normal.   Neck:      Thyroid: No thyromegaly.   Cardiovascular:      Rate and Rhythm: Normal rate and regular rhythm.      Heart sounds: No murmur heard.     No friction rub. No gallop.   Pulmonary:      Effort: No respiratory distress.      Breath sounds: Normal breath sounds.   Abdominal:      General: Bowel sounds are normal. There is no distension.      Palpations: Abdomen is soft.      Tenderness: There is no abdominal tenderness.   Musculoskeletal:      Comments: Boot in place left lower extremity   Lymphadenopathy:      Head:      Right side of head: No submandibular adenopathy.      Cervical: No cervical adenopathy.      Upper Body:      Right upper body: No supraclavicular adenopathy.      Left upper body: No " supraclavicular adenopathy.   Skin:     General: Skin is warm and dry.      Findings: Rash present.      Comments: Overall the patient's hands have improved compared to prior exam.  The degree of erythema in the palms and dorsal aspect of her hands has diminished.  The degree of sclerodactyly has diminished, mobility in her fingers has improved.  She does have significant dry skin in her palms with some skin cracking, no significant peeling.   Neurological:      Mental Status: She is alert and oriented to person, place, and time.      Cranial Nerves: No cranial nerve deficit.      Motor: No abnormal muscle tone.      Deep Tendon Reflexes: Reflexes normal.   Psychiatric:         Behavior: Behavior normal.           Result Review: Reviewed CBC, CMP from today.  Reviewed bone scan and CT chest abdomen pelvis from 2/14/2025.     Assessment and Plan     *Previous Stage Ib ([id number]) right breast cancer:  Diagnosed May 2010 with excisional biopsy for invasive ductal carcinoma, 1.3 cm, grade 2, ER 90%, NV 80%, HER-2/peter negative (1+ IHC).    Subsequent right mastectomy in July 2010 with no residual breast malignancy, 1/5 sentinel lymph node with micrometastasis (0.25 mm).    Treated in the [facility] system with adjuvant AC ×4 cycles in 2010 (no taxanes administered due to underlying Charcot-Saloni-Tooth with peripheral neuropathy).    Adjuvant Femara (postmenopausal) initiated October 2010 with plan to treat ×10 years.    Genetic testing reportedly negative.    Developed osteopenia treated with Prolia beginning 2/27/13. Subsequently discontinued due to identification of metastatic disease.    *Recurrent/metastatic disease identified 10/8/17:  Disease involving thoracic spine with cord compression at T6, lumbosacral involvement, sternal and right sternoclavicular involvement.    Femara discontinued in 10/2017.    Radiation administered (in the [facility] system) to the thoracic spine beginning 10/19/17 treating T3-T9 to a dose  of 24 gray in 6 fractions.  Evaluation with MRI 12/8/17 showing persistent T6 cord compression with persistent neurologic compromise requiring surgical treatment 12/11/17 with T6 laminectomy/corpectomy and T3-T9 fusion.  Pathology with metastatic carcinoma of breast origin, ER negative, IL negative, HER-2/peter negative (1+ IHC).  Additional staging evaluation 12/8/17 with no evidence of visceral metastatic disease, bone scan showing involvement of thoracic spine, sternum, left humerus, mid frontal bone.  No plane film correlate of left humerus lesion.  MRI lumbar spine with small intradural L3 metastasis.  CA 15-3 12/6/17- 17.  Palliative radiation therapy to L3 dural metastasis and left humerus initiated 1/15/18 (10 fractions), completed 1/26/18.  Hypercalcemia of malignancy with calcium in the 10-11 range.  Initiation of monthly Xgeva 1/23/18.  Baseline CT scan 1/30/18 with no evidence of visceral involvement.  Cluster of nodular opacities in the right lower lobe suspected to be infectious or related to bronchiolitis. Bone scan 1/30/18 showed postsurgical change in the thoracic spine, stable uptake in the frontal bone, no new areas of disease.  Initiation of palliative oral single agent Xeloda 2/7/18 2000 mg a.m., 1500 mg p.m. for 14/21 days.   Following 3 cycles xeloda, bone scan 4/4/18 showed no change from the prior study.  CT scan 4/4/18 showed a small pericardial effusion of unclear significance as well as a subcutaneous nodule in the right lateral chest wall.  Subsequent evaluation with echocardiogram 4/17/18 showed no evidence of pericardial effusion.  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  We discussed the possibility of surgical excision of the right subcutaneous chest wall lesion for more definitive diagnosis.  Reviewed previous CT images dating back to  12/8/17 and the lesion was present even at that time measuring around 1.7 cm although not commented on in the radiology report.  As this appears to be an indolent low-grade process unrelated to her breast cancer, recommendee foregoing surgical excision at this time and monitoring this area on future scans.  The patient agreed.    Following 6 cycles of Xeloda, CT 6/6/18  showed stable findings, no evidence of progressive disease.  There was a comment regarding subcutaneous abnormality in the anterior abdominal wall and this was related to Lovenox injection sites.  Bone scan 6/6/18 showed no interval change.   CT scan and bone scan 8/13/18 following 9 cycles of Xeloda showed stable findings with no evidence of significant visceral metastases.  Her bone lesions appear stable on bone scan.  The spindle cell neoplasm in her right chest wall actually decreased in size from 2 cm down to 1.6 cm.    The patient experienced some symptoms of diarrhea, anorexia, generalized weakness during cycle 9 Xeloda it was unclear whether this was related to a viral gastroenteritis or toxicity from treatment.  Symptoms recurred during cycle 10 and treatment was cut short by 2 days.  Symptoms attributed to Xeloda.  With cycle 11, dose and schedule altered to 1500 mg twice daily for 7 days on followed by 7 days off .  CT scan 9/9/2020 with no significant changes.  Bone scan 9/9/2020 read as unchanged from prior studies however did note an area of slight activity in the medial left femur.  Contacted radiology and although this was not noted on prior reports appears to have been present.  Subsequent MRI left femur 9/21/2020 with cortical thickening and periosteal edema left iliac us muscle insertion to the medial left femur with no evidence of metastatic disease, favored to represent periosteal change secondary to insertional tendinitis.  Severe hand-foot symptoms causing sclerodactyly and limitation in finger movement prompted change in  dosing in July 2021 with Xeloda decreased to 1000 mg a.m., 1500 mg p.m. for 7 days on followed by 7 days off.  Patient was experiencing severe hand-foot syndrome related to Xeloda having developed skin peeling, sclerodactyly with limited use of her hands.  On 3/31/2022, Xeloda was held to allow for recovery.  Patient resumed Xeloda on 4/18/2022.  Patient required hospitalization 5/29 - 6/1/2022 with presumed viral gastroenteritis that was exacerbated by Xeloda.  Xeloda held briefly, resumed on 6/6/2022.  Patient again with worsening sclerodactyly, Xeloda held for 2 weeks from 10/3 - 10/17/2022, resumed at prior dose with improvement in symptoms.  Scans on 3/3/2023 with CT chest abdomen pelvis and bone scan showing stable findings.  CT chest abdomen pelvis 7/3/2023 with questionable 1 mm change in size right lower lobe nodule.  Additional small pulmonary nodules stable.  Stable bony metastatic disease.  Bone scan on 7/7/2023 however showed slight progression focal involvement right ischium and superior pubic ramus (new ischial lesion superior pubic ramus compared to 10/24/2022 and more conspicuous compared to 3/3/2023).  Metastatic lesion right inferior pubic ramus and ischial tuberosity increased in size since October 2022 however stable from 3/3/2023.  MRI cervical spine 7/3/2023 with no metastatic involvement however identification of the lesion at T2, recommended dedicated thoracic spine MRI to evaluate further.  Given the minimal extent and slow degree of progression, elected to continue oral Xeloda and evaluate further with MRI pelvis and thoracic spine.  Consideration of biopsy pelvic metastasis for repeat ER/WA/HER2/peter testing.    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  CA 15-3 on 7/10/2023 was 12.2, uninformative.  MRI thoracic spine 7/27/2023 with 1 cm metastatic focus seen in L1  MRI pelvis 7/27/2023 with metastatic foci identified right superior  pubic ramus, right ischial tuberosity, inferior pubic ramus, L5 body.  Chronic appearing posttraumatic and/or degenerative change in the posterior right ilium adjacent SI joint.  CT-guided biopsy right pubic ramus lesion on 8/31/2023.  Pathology showed no evidence of malignancy, showed markedly calcified and sclerotic mature lamellar bone.  Attempted decalcification but no evidence of malignancy.  CT chest abdomen pelvis 9/8/2023 and bone scan 9/11/2023 with stable findings.  CT chest abdomen pelvis 12/8/2023 with possible slight increase in right chest wall subcutaneous lesion (1.8 x 1.1 up to 1.8 x 1.4 cm) although may be related to altered positioning.  Nonpathologically enlarged right axillary and subpectoral lymph nodes slightly increased (patient notes she received RSV vaccine right arm just prior to scan).  Bone scan 12/11/2023 with stable findings.  CT chest abdomen pelvis 3/19/2024 with slight interval decrease in size of right axillary and bilateral subpectoral lymph nodes, right subcutaneous soft tissue nodule slightly smaller, stable pulmonary nodules, stable bone metastases.  Bone scan 3/19/2024 with stable findings.  CT chest abdomen pelvis 6/21/2024 showed left subpectoral lymph node to have increased slightly in size (0.9 x 0.6 up to 1.3 x 0.8 cm).  Subcutaneous nodule right chest wall decreased from 1.1 x 1.9 down to 1.1 x 1.4 cm.  Scan otherwise stable.  Bone scan 6/21/2024 with stable findings  CT chest abdomen pelvis 9/23/2024 with resolution of left subpectoral lymphadenopathy, additional findings stable.  Bone scan 9/23/2024 with stable findings.  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  She stopped Xeloda 1 week prior to surgery.  She resumed Xeloda on 1/14/2025 at previous dosing schedule  CT chest abdomen pelvis 2/14/2025 showed stable findings and bone, no evidence of visceral disease.  Bone scan with  increased activity right L5/S1 disc space felt to be degenerative with unchanged CT appearance of the vertebral bodies.  Postoperative activity noted left midfoot.  Previous sites of disease activity stable.  The patient returns today continuing on treatment with Xeloda 1000 mg a.m., 1500 mg p.m. 7 days on followed by 7 days off.  She is continuing on Xgeva every 3 months, last received on 1/14/2025.  She has been on single agent Xeloda since 2/7/2018.  The patient recently had a period of time off treatment for her left foot surgery.  She held Xeloda 1 week prior to surgery that was performed on 12/10/2024.  She resumed treatment on 1/14/2025.  She has continued to tolerate treatment extremely well, hand-foot syndrome has improved with her time off of treatment in terms of the degree of sclerodactyly in her fingers and degree of erythema.  She was encouraged to continue using emollient cream frequently in addition to triamcinolone prescribed by dermatology 2 weeks on followed by 2 weeks off.  We reviewed her scans as noted above from 2/14/2025.  CT scans showed stable findings.  Bone scan did show some new activity around L5/S1 however this was felt to represent degenerative disc disease as the vertebral bodies appeared stable on CT in this area.  We will certainly follow this up on future scan.  She is asymptomatic in that area.  Previous sites of disease appeared stable and bone on both CT and bone scan.  Therefore we discussed continuation of treatment today.  Patient will be undergoing a course of physical therapy and recovery from her foot surgery, continues with a boot in place and is in a wheelchair today.  She returned on 4/14/2025 and she is due for Xgeva for NP visit.  I will see her back for further follow-up in mid May with repeat labs to review.  Pending her overall status we will discuss timeframe for follow-up scans, likely at least a 4-month interval from current studies.    *Right pulmonary  embolism:  Diagnosed on CT angiogram 10/21/17 involving small right lower lobe pulmonary artery.  Lower extremity Dopplers negative.  Bilateral lower extremity Dopplers negative again 12/5/17.  Received chronic Lovenox 1 mg/kg twice per day, transitioned to oral Eliquis in February 2019, continuing on Eliquis 5 mg twice daily.  Patient continues on Eliquis 5 mg twice daily    *Cancer related pain:  Previously receiving Duragesic 50 µg patch every 72 hours along with Dilaudid 4 mg as needed for breakthrough pain  The patient's pain improved over time and she was able to discontinue both Duragesic and Dilaudid in the interval.  Patient does take occasional Flexeril at bedtime due to back spasm/pain when she has been more active.  The patient does have some occasional aggravation of her chronic back pain and does use tramadol 50 mg every 8 hours as needed  Patient was receiving tramadol 50 mg every 8-12 hours as needed in addition to hydrocodone 5/325 every 4 hours as needed.  She was also taking OTC NSAIDs.  Patient developed nausea related to hydrocodone and was transitioned to Percocet 5/325 every 4 hours as needed, advised to stop taking NSAIDs with ongoing anticoagulation.  Patient has chronic pain in her shoulders that does wax and wane is unrelated to her malignancy.  She notes that right shoulder pain has been exacerbated following her left foot surgery and she has relied on her arms for transfers and to assist with mobility.  She also has developed some mild pain around her right hip likely related to favoring the right leg when transferring or attempting to ambulate.  She will continue to work with physical therapy.  She was previously receiving tramadol 50 mg every 8 hours and Percocet 5/325 every 4 hours as needed.  She currently has Percocet 7.5/325 prescribed by her PCP which she has been taking intermittently postoperatively.    *Chemotherapy-induced diarrhea with subsequent C. difficile colitis in the  setting of previous ulcerative colitis and then identification of enteropathogenic E. coli:  Patient with reported history of ulcerative colitis, continues on mesalamine.  The patient developed initial diarrhea related to Xeloda at regular dosing.  Symptoms improved on reduced dose Xeloda  Flare of symptoms in October 2018 with apparent finding of C. difficile colitis by GI, treated with course of oral vancomycin with improvement in symptoms.  Patient notes minimal intermittent diarrhea/loose stools on Xeloda requiring occasional dosing of Imodium.    Patient required hospitalization 5/29 - 6/1/2022 with presumed viral gastroenteritis that was exacerbated by Xeloda.  Xeloda held briefly, resumed on 6/6/2022.  Patient's  developed C. difficile colitis and patient was experiencing increase in diarrhea.  Stool studies performed 8/4/22 negative C. difficile however GI PCR was positive for enteropathogenic E. coli.  Given patient's symptoms and immunocompromise status it was elected to treat her with azithromycin 500 mg daily x3 days beginning 8/5/2022  Diarrhea resolved  Patient underwent colonoscopy on 2/28/2023 with findings of diverticulosis  Patient had been experiencing intermittent episodes of diarrhea however is now constipated related to narcotics.  We discussed starting that regimen with Senokot 1 to 2 tablets twice daily and adding MiraLAX daily if needed.    *Traumatic left tibia/fibular fracture:  Status post ORIF 12/6/17  Specimen was sent for pathologic review, negative for evidence of malignancy    *Hypercalcemia:  Suspect hypercalcemia of malignancy, calcium in  10-11 range previously.  Calcium normalized following initiation of monthly Xgeva on 1/23/18.    *Chemotherapy-induced mucositis:  Patient has not experienced any recent difficulty with mucositis.    *Recurrent UTI, bladder wall thickening on CT:  Patient had an enterococcal UTI on 3/2/18 sensitive to nitrofurantoin and received treatment,  unclear how long.  Recurrent UTI 3/20/18 with urine culture growing Klebsiella, initially treated with nitrofurantoin, transitioned to Levaquin.  CT 4/4/18 with diffuse bladder wall thickening with increased nodular thickening at the left base.  Referral to urogynecology Dr. May Johnson.  She was placed on a prophylactic dose of trimethoprim 100 mg daily, bladder wall thickening felt to be related to recent recurrent urinary tract infections.  Patient with urinary symptoms, treated with course of Macrobid at the end of December 2018, urine culture however was negative 12/31/18.  Patient was found to have Klebsiella UTI 7/29/2019 which was successfully treated with Macrobid with complete resolution of symptoms.  CT 8/10/2021 with diffuse bladder wall thickening new from 5/17/2021 (however seen on multiple prior scans).    UTI on 10/18/2021 with E. coli greater than 100,000 colonies that was pansensitive, treated with Macrobid x7 days  With ongoing/recurrent UTIs patient was seen by urogynecology and initiated suppressive therapy with trimethoprim 100 mg daily in December 2021.  She has not experienced any further urinary tract infections.  CT abdomen pelvis 3/28/2022 does show diffuse thickening of urinary bladder as has been seen on prior studies indicative of cystitis.  Patient notes that she did stop taking trimethoprim on a daily basis.    Patient with urine culture on 5/5/2023 that grew E. coli and she received antibiotic treatment from urogynecology.    Urine culture on 8/23/2023 with greater than 100,000 colonies of E. coli resistant to ampicillin and Bactrim.  Received 5-day course of Cipro with resolution of symptoms.  Patient did have UTI with E. coli on culture 10/2/2023, received a course of Augmentin.  Patient with ongoing intermittent urinary tract infections.  Patient reports recent development of dysuria again and we will check urinalysis and culture today    *Charcot-Saloni-Tooth with mobility  difficulties:  The patient underwent an intensive course of [facility] at [facility].  She graduated from her outpatient course November 2018.  Overall the patient has improved dramatically in terms of mobility,   Patient developed significant callus formation lateral aspect of the left plantar surface.    Patient developed skin erosion and an ulceration on the lateral aspect of her left foot.    Patient required surgery on her left foot by Dr. Lopez on 12/10/2024  Patient is continuing to recover from her left foot surgery in December.  She is currently in a boot, remains in a wheelchair.  She is starting physical therapy.    *Depression:  The patient is continuing follow-up in the supportive oncology clinic and is currently continuing on Cymbalta 30 mg twice daily as well as gabapentin 900 mg nightly, Ativan 0.5 mg nightly as needed, Provigil 100 mg daily.  Patient does continue to attend support groups at [facility].  The patient does continue routine follow-up in supportive oncology clinic.    Patient does have multiple stressors with her 's malignancy and chemotherapy as well as her autistic son who is living with them at home.  Patient continues to deal with grief related to her 's death.  She has seen a grief counselor through hospice which helped significantly.  She continues routine follow-up with supportive oncology as well.    *Hand-foot syndrome secondary to Xeloda:  Patient continues with frequent application of emollient cream to the hands and feet  Symptoms increased significantly requiring a 1 week delay in cycle 18 Xeloda as noted above.  Symptoms did improve and she continued on the same dose.    Patient was referred to dermatology and has been continuing on triamcinolone 0.1% cream used for 1 week on followed by 1 week off which led to some further improvement.   Progressive palmar erythema with development of sclerodactyly and effect on dexterity.  Addition of urea-based  cream 3 times daily.  Patient with continued difficulty regarding erythema of her hands that extended onto the dorsal aspect and was causing contractures/sclerodactyly in her fingers affecting her dexterity.  In July 2021, held Xeloda for an additional week and then reduced dose from 1500 mg twice daily down to 1000 mg a.m. and 1500 mg p.m. and continued on a 7-day on followed by 7-day off schedule.  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  On 3/31/2022, Xeloda was held to allow for recovery.  Patient resumed Xeloda on 4/18/2022.  Patient developed worsening sclerodactyly affecting dexterity that required Xeloda to again be briefly held for 2 weeks from 10/3 - 10/17/2022.  Treatment resumed at prior dose after improvement in symptoms.  Patient continues to use emollient cream frequently and topical triamcinolone 0.1% twice daily intermittently (2 weeks on followed by 2 weeks off as instructed by dermatology).  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  The degree of erythema on the dorsal aspect of the hand has diminished.  She does have some degree of dry skin and skin cracking in her palms and was encouraged to continue using her emollient cream frequently.    *Evidence of steatosis on scans with mild intermittent elevated liver function studies:  Liver function studies increased 8/20/19 with ALT 98, AST 70, normal total bilirubin.  Negative viral hepatitis A, B, and C panel 8/23/18  Likely related to hepatic steatosis.   Subsequent improvement in LFTs  LFTs today are normal    *Chemotherapy induced leukopenia:  WBC today is normal at 8.7    *GERD, question of  celiac disease:  Patient with significant history of reflux  Patient currently receiving Protonix 40 mg daily daily and Carafate 1 g 4 times daily as needed  Patient required hospitalization 5/29 - 6/1/2022 with presumed viral gastroenteritis that was exacerbated by Xeloda.    EGD performed 5/31/2022 during hospitalization with biopsy that showed focal blunting of villi and atrophy with slight increase in intraepithelial lymphocytes.  Changes were minimal but recommended correlation with serology to exclude celiac disease.  Celiac serology 8/8/2022 negative  Patient previously developed worsening reflux symptoms, temporarily increase Protonix to 40 mg twice daily and we did add Carafate 1 g 4 times daily.    She is taking Protonix 40 mg once daily, is not taking Carafate.    *Insomnia:  Patient with prior paradoxical reaction to Benadryl  Improved previously on temazepam 15 mg nightly as needed.   Patient noted subsequently that temazepam was having no effect.   Patient continuing on gabapentin 900 mg nightly    *Health maintenance:  Patient notes that she has a history of colon polyps as well as ulcerative colitis and was due for a follow-up colonoscopy on 9/12/2019.  We did discuss there is no necessity to pursue colonoscopy in the setting of her metastatic breast cancer.    The patient did undergo colonoscopy on 2/7/2020 with findings of muscular hypertrophy and diverticulosis.   Patient did wish to proceed with further colonoscopic evaluation, performed on 12/20/2022 with poor prep.  Repeat 2/28/2023 with diverticulosis.    *Bilateral shoulder pain:  Chronic difficulty with right shoulder although she has not complained of this in prior visits to our office.  She reported difficulty with abduction.    The patient did undergo MRI of her right shoulder on 11/21/2019 at an outside facility showing multiple abnormalities including supraspinatus tendinosis, labral tear but no evidence of metastatic disease.  Patient  developed pain in the left shoulder, was seen by orthopedics and underwent steroid injection with some improvement.  Right shoulder stable.  Patient did undergo physical therapy with some improvement.  She continues to use tramadol 50 mg every 8-12 hours as needed.  Note that current CT 10/20/2022 made notation of left shoulder probable bursitis.    Patient continues with bilateral shoulder pain which does wax and wane.  Recently right shoulder pain has been more problematic, exacerbated by using her arms to assist with transfers and ability after foot surgery    *Ocular changes in part related to Xeloda:  Patient experienced a mild degree of blurred vision as well as burning and pruritus.  She was seen by her ophthalmologist and was placed on xiidra ophthalmic drops which have helped.  Likely both issues are to some extent related to Xeloda.  Symptoms did worsen and patient was seen by ophthalmologist at [facility].  She is now using an ocular lubricant at night in addition to artificial tears which have helped.  Symptoms currently stable to improved    *Elevated glucose:  It is noted the patient's glucose at the last few visits has been in the high 100 range, postprandial.  She has had a hemoglobin A1c that has been in the high 5-6 range in the past  Hemoglobin A1c was last checked on 12/3/2024 at 5.8    *Possible loosening of pedicle screw at T3:  CT cervical spine 5/17/2021 showed loosening of the left pedicle screw at T3.  Patient referred to orthopedics and was seen by Dr. Nayak who felt that there were no concerning findings on the scan    *Iron deficiency anemia  Labs on 5/2/2022 with hemoglobin 10.8.  Additional labs with iron 48, ferritin 21.2, iron saturation 11%, TIBC 4 and 32, folate greater than 20, B12 greater than 2000.    Attempted treatment with oral iron daily however patient was intolerant  Patient subsequently received Venofer 300 mg weekly x4 beginning 6/6/2022 and completed on  6/27/2022  Subsequent iron studies on 7/11/2022 showed improvement with iron 62, ferritin 345, iron saturation 18%, TIBC 336.  Hemoglobin had improved up to 12.7 at that time.  Repeat iron studies on 10/3/2022 showed iron replete status with hemoglobin 13.7, iron 121, ferritin 208.7, iron saturation 31%, TIBC 392.  Hemoglobin currently 11.7.  Hemoglobin is improving since time of ankle surgery.  If hemoglobin does not normalize we will recheck anemia evaluation     Plan:  Continue palliative single agent Xeloda 1000 mg a.m., 1500 mg in the p.m. 7 days on followed by 7 days off (patient has been on single agent Xeloda since 2/7/2018)  Patient continues on every 3-month Xgeva last administered 1/14/2025 and due next on 4/14/2025  Continue Eliquis 5 mg twice daily  The patient will continue frequent use of emollient cream currently 5 times daily and continue periodic triamcinolone cream 0.1% twice daily (provided by dermatology) 2 weeks on followed by 2 weeks off as prescribed  Continue Protonix 40 mg daily  Continue ocular lubricating gel nightly per ophthalmology  Continue Provigil 100 mg daily and Cymbalta 30 mg twice daily.  Patient continues routine follow-up with supportive oncology   Patient will continue gabapentin 900 mg at night.   Patient continues on tramadol 50 mg every 8 hours as needed for pain  Continue Percocet 7.5/325 every 8 hours as needed for pain  Patient advised to begin bowel regimen with Senokot 1 to 2 tablets daily and add MiraLAX daily as needed with ongoing narcotic use  Additional labs today with urinalysis and urine culture  MD visit on 4/14/2025 with CBC, CMP, magnesium, phosphorus.  Patient will be due for 3-month dose of Xgeva  MD visit in mid May 2025 with CBC, CMP.  We will discuss timeframe for follow-up scans.     Patient continuing on high risk medication requiring intensive monitoring.       I did spend 50 minutes in total time caring for the patient today, time spent in review of  records, face-to-face consultation, coordination of care, placement of orders, completion of documentation.

## 2025-02-26 ENCOUNTER — SPECIALTY PHARMACY (OUTPATIENT)
Dept: PHARMACY | Facility: HOSPITAL | Age: 65
End: 2025-02-26
Payer: MEDICARE

## 2025-02-26 ENCOUNTER — TELEPHONE (OUTPATIENT)
Dept: ONCOLOGY | Facility: CLINIC | Age: 65
End: 2025-02-26
Payer: MEDICARE

## 2025-02-26 NOTE — PROGRESS NOTES
Specialty Pharmacy Note: Xeloda (capecitabine)    Martha Davey is a 64 y.o. female with breast cancer was seen 2/24/25 by Dr. Hancock. Per provider dictation, no changes to oral oncology regimen Xeloda (capecitabine).  Labs Review: The CMP and CBC from 2/24/25 have been reviewed. No dose adjustments are needed for the oral specialty medication(s) based on the labs.    Specialty pharmacy will continue to follow patient.    Lubna Gupta, DimitriosD, BCPS  2/26/2025  11:09 EST

## 2025-02-26 NOTE — TELEPHONE ENCOUNTER
Caller: Martha Davey    Relationship: Self    Best call back number: [phone]      What was the call regarding: PATIENT WANTED TO KNOW IF DR RICHARDS WOULD CALL IN AN ANTIBIOTIC FOR UTI      [facility] [id number] - [facility] [address] AT [facility] [phone] [facility] [phone] FX         PLEASE CALL PATIENT TO ADVISE

## 2025-02-26 NOTE — TELEPHONE ENCOUNTER
I called the patient back, let her know per Dr. Hancock that we need to wait on the sensitivity of the culture before we prescribe an antibiotic due to having resistance issues in the past. I let know that those results should be back tomorrow. Pt v/u

## 2025-02-27 LAB — BACTERIA SPEC AEROBE CULT: ABNORMAL

## 2025-02-27 RX ORDER — SULFAMETHOXAZOLE AND TRIMETHOPRIM 800; 160 MG/1; MG/1
1 TABLET ORAL 2 TIMES DAILY
Qty: 14 TABLET | Refills: 0 | Status: SHIPPED | OUTPATIENT
Start: 2025-02-27 | End: 2025-03-06

## 2025-02-27 NOTE — TELEPHONE ENCOUNTER
Phoned patient to inform her that her urine culture and sensitivity resulted, and she does have a urinary tract infection. Per Dr. Hancock, we are sending a prescription for Bactrim DS 1 tablet BID x 7 days to her pharmacy. Patient v/u.   No

## 2025-03-02 RX ORDER — DULOXETIN HYDROCHLORIDE 30 MG/1
30 CAPSULE, DELAYED RELEASE ORAL DAILY
Qty: 90 CAPSULE | Refills: 0 | Status: SHIPPED | OUTPATIENT
Start: 2025-03-02

## 2025-03-02 RX ORDER — DULOXETIN HYDROCHLORIDE 60 MG/1
60 CAPSULE, DELAYED RELEASE ORAL DAILY
Qty: 90 CAPSULE | Refills: 0 | Status: SHIPPED | OUTPATIENT
Start: 2025-03-02

## 2025-03-06 DIAGNOSIS — I10 HYPERTENSION, UNSPECIFIED TYPE: ICD-10-CM

## 2025-03-06 DIAGNOSIS — E78.5 HYPERLIPIDEMIA, UNSPECIFIED HYPERLIPIDEMIA TYPE: Primary | ICD-10-CM

## 2025-03-06 DIAGNOSIS — R73.9 HYPERGLYCEMIA: ICD-10-CM

## 2025-03-10 ENCOUNTER — TELEPHONE (OUTPATIENT)
Dept: NEUROLOGY | Facility: CLINIC | Age: 65
End: 2025-03-10
Payer: MEDICARE

## 2025-03-10 NOTE — TELEPHONE ENCOUNTER
Attempted to LVM in regard to provider being out of office. Informed on VM, lettrshart message, and mail reminder on the next new appointment set for patient. Patient is scheduled for March 28th at 11:00AM

## 2025-03-31 ENCOUNTER — LAB (OUTPATIENT)
Dept: LAB | Facility: HOSPITAL | Age: 65
End: 2025-03-31
Payer: MEDICARE

## 2025-03-31 ENCOUNTER — OFFICE VISIT (OUTPATIENT)
Dept: ONCOLOGY | Facility: CLINIC | Age: 65
End: 2025-03-31
Payer: MEDICARE

## 2025-03-31 ENCOUNTER — TELEPHONE (OUTPATIENT)
Dept: ONCOLOGY | Facility: CLINIC | Age: 65
End: 2025-03-31
Payer: MEDICARE

## 2025-03-31 VITALS
DIASTOLIC BLOOD PRESSURE: 83 MMHG | SYSTOLIC BLOOD PRESSURE: 125 MMHG | TEMPERATURE: 97.9 F | WEIGHT: 173.8 LBS | HEART RATE: 94 BPM | HEIGHT: 60 IN | BODY MASS INDEX: 34.12 KG/M2 | OXYGEN SATURATION: 96 % | RESPIRATION RATE: 16 BRPM

## 2025-03-31 DIAGNOSIS — R30.0 DYSURIA: ICD-10-CM

## 2025-03-31 DIAGNOSIS — C50.811 MALIGNANT NEOPLASM OF OVERLAPPING SITES OF RIGHT BREAST IN FEMALE, ESTROGEN RECEPTOR NEGATIVE: Primary | ICD-10-CM

## 2025-03-31 DIAGNOSIS — Z79.899 HIGH RISK MEDICATION USE: ICD-10-CM

## 2025-03-31 DIAGNOSIS — Z17.1 MALIGNANT NEOPLASM OF OVERLAPPING SITES OF RIGHT BREAST IN FEMALE, ESTROGEN RECEPTOR NEGATIVE: Primary | ICD-10-CM

## 2025-03-31 DIAGNOSIS — C50.811 MALIGNANT NEOPLASM OF OVERLAPPING SITES OF RIGHT BREAST IN FEMALE, ESTROGEN RECEPTOR NEGATIVE: ICD-10-CM

## 2025-03-31 DIAGNOSIS — Z17.1 MALIGNANT NEOPLASM OF OVERLAPPING SITES OF RIGHT BREAST IN FEMALE, ESTROGEN RECEPTOR NEGATIVE: ICD-10-CM

## 2025-03-31 LAB
ALBUMIN SERPL-MCNC: 3.7 G/DL (ref 3.5–5.2)
ALBUMIN/GLOB SERPL: 1.1 G/DL
ALP SERPL-CCNC: 70 U/L (ref 39–117)
ALT SERPL W P-5'-P-CCNC: 5 U/L (ref 1–33)
ANION GAP SERPL CALCULATED.3IONS-SCNC: 17 MMOL/L (ref 5–15)
AST SERPL-CCNC: 18 U/L (ref 1–32)
BASOPHILS # BLD AUTO: 0.06 10*3/MM3 (ref 0–0.2)
BASOPHILS NFR BLD AUTO: 1 % (ref 0–1.5)
BILIRUB SERPL-MCNC: 0.3 MG/DL (ref 0–1.2)
BILIRUB UR QL STRIP: ABNORMAL
BUN SERPL-MCNC: 15 MG/DL (ref 8–23)
BUN/CREAT SERPL: 18.1 (ref 7–25)
CALCIUM SPEC-SCNC: 8.8 MG/DL (ref 8.6–10.5)
CHLORIDE SERPL-SCNC: 101 MMOL/L (ref 98–107)
CLARITY UR: ABNORMAL
CO2 SERPL-SCNC: 25 MMOL/L (ref 22–29)
COLOR UR: ABNORMAL
CREAT SERPL-MCNC: 0.83 MG/DL (ref 0.57–1)
DEPRECATED RDW RBC AUTO: 63.3 FL (ref 37–54)
EGFRCR SERPLBLD CKD-EPI 2021: 78.8 ML/MIN/1.73
EOSINOPHIL # BLD AUTO: 0.16 10*3/MM3 (ref 0–0.4)
EOSINOPHIL NFR BLD AUTO: 2.5 % (ref 0.3–6.2)
ERYTHROCYTE [DISTWIDTH] IN BLOOD BY AUTOMATED COUNT: 19.3 % (ref 12.3–15.4)
GLOBULIN UR ELPH-MCNC: 3.3 GM/DL
GLUCOSE SERPL-MCNC: 118 MG/DL (ref 65–99)
GLUCOSE UR STRIP-MCNC: NEGATIVE MG/DL
HCT VFR BLD AUTO: 38.3 % (ref 34–46.6)
HGB BLD-MCNC: 11.7 G/DL (ref 12–15.9)
HGB UR QL STRIP.AUTO: ABNORMAL
IMM GRANULOCYTES # BLD AUTO: 0.01 10*3/MM3 (ref 0–0.05)
IMM GRANULOCYTES NFR BLD AUTO: 0.2 % (ref 0–0.5)
KETONES UR QL STRIP: ABNORMAL
LEUKOCYTE ESTERASE UR QL STRIP.AUTO: ABNORMAL
LYMPHOCYTES # BLD AUTO: 1.71 10*3/MM3 (ref 0.7–3.1)
LYMPHOCYTES NFR BLD AUTO: 27.2 % (ref 19.6–45.3)
MAGNESIUM SERPL-MCNC: 2 MG/DL (ref 1.6–2.4)
MCH RBC QN AUTO: 27.6 PG (ref 26.6–33)
MCHC RBC AUTO-ENTMCNC: 30.5 G/DL (ref 31.5–35.7)
MCV RBC AUTO: 90.3 FL (ref 79–97)
MONOCYTES # BLD AUTO: 0.49 10*3/MM3 (ref 0.1–0.9)
MONOCYTES NFR BLD AUTO: 7.8 % (ref 5–12)
NEUTROPHILS NFR BLD AUTO: 3.86 10*3/MM3 (ref 1.7–7)
NEUTROPHILS NFR BLD AUTO: 61.3 % (ref 42.7–76)
NITRITE UR QL STRIP: NEGATIVE
NRBC BLD AUTO-RTO: 0 /100 WBC (ref 0–0.2)
PH UR STRIP.AUTO: 5.5 [PH] (ref 4.5–8)
PLATELET # BLD AUTO: 450 10*3/MM3 (ref 140–450)
PMV BLD AUTO: 9.9 FL (ref 6–12)
POTASSIUM SERPL-SCNC: 4.3 MMOL/L (ref 3.5–5.2)
PROT SERPL-MCNC: 7 G/DL (ref 6–8.5)
PROT UR QL STRIP: ABNORMAL
RBC # BLD AUTO: 4.24 10*6/MM3 (ref 3.77–5.28)
SODIUM SERPL-SCNC: 143 MMOL/L (ref 136–145)
SP GR UR STRIP: >=1.03 (ref 1–1.03)
UROBILINOGEN UR QL STRIP: ABNORMAL
WBC NRBC COR # BLD AUTO: 6.29 10*3/MM3 (ref 3.4–10.8)

## 2025-03-31 PROCEDURE — 99215 OFFICE O/P EST HI 40 MIN: CPT | Performed by: NURSE PRACTITIONER

## 2025-03-31 PROCEDURE — 87186 SC STD MICRODIL/AGAR DIL: CPT | Performed by: NURSE PRACTITIONER

## 2025-03-31 PROCEDURE — 87077 CULTURE AEROBIC IDENTIFY: CPT | Performed by: NURSE PRACTITIONER

## 2025-03-31 PROCEDURE — 3074F SYST BP LT 130 MM HG: CPT | Performed by: NURSE PRACTITIONER

## 2025-03-31 PROCEDURE — 80053 COMPREHEN METABOLIC PANEL: CPT

## 2025-03-31 PROCEDURE — 1160F RVW MEDS BY RX/DR IN RCRD: CPT | Performed by: NURSE PRACTITIONER

## 2025-03-31 PROCEDURE — 87088 URINE BACTERIA CULTURE: CPT | Performed by: NURSE PRACTITIONER

## 2025-03-31 PROCEDURE — 1126F AMNT PAIN NOTED NONE PRSNT: CPT | Performed by: NURSE PRACTITIONER

## 2025-03-31 PROCEDURE — 83735 ASSAY OF MAGNESIUM: CPT

## 2025-03-31 PROCEDURE — 87086 URINE CULTURE/COLONY COUNT: CPT | Performed by: NURSE PRACTITIONER

## 2025-03-31 PROCEDURE — 1159F MED LIST DOCD IN RCRD: CPT | Performed by: NURSE PRACTITIONER

## 2025-03-31 PROCEDURE — 85025 COMPLETE CBC W/AUTO DIFF WBC: CPT

## 2025-03-31 PROCEDURE — 81003 URINALYSIS AUTO W/O SCOPE: CPT

## 2025-03-31 PROCEDURE — 3079F DIAST BP 80-89 MM HG: CPT | Performed by: NURSE PRACTITIONER

## 2025-03-31 PROCEDURE — 36415 COLL VENOUS BLD VENIPUNCTURE: CPT

## 2025-03-31 PROCEDURE — G2211 COMPLEX E/M VISIT ADD ON: HCPCS | Performed by: NURSE PRACTITIONER

## 2025-03-31 RX ORDER — CIPROFLOXACIN 250 MG/1
250 TABLET, FILM COATED ORAL 2 TIMES DAILY
Qty: 6 TABLET | Refills: 0 | Status: SHIPPED | OUTPATIENT
Start: 2025-03-31 | End: 2025-04-03

## 2025-03-31 RX ORDER — CIPROFLOXACIN 500 MG/1
250 TABLET, FILM COATED ORAL 2 TIMES DAILY
Qty: 3 TABLET | Refills: 0 | Status: SHIPPED | OUTPATIENT
Start: 2025-03-31 | End: 2025-03-31

## 2025-03-31 NOTE — PROGRESS NOTES
Chief Complaint  Previous Stage Ib ([id number]) ER/KY positive, HER-2/peter negative right breast cancer with subsequent metastatic disease identified 10/8/2017, history of right pulmonary embolism, cancer related pain, chemotherapy-induced diarrhea, chemotherapy-induced mucositis, chemotherapy-induced hand-foot syndrome    Subjective        History of Present Illness  The patient returns today for triage visit.  She is on Xeloda 1000 mg in the morning, 1500 mg in the evening 7 days on followed by 7 days off as well as every 3-month Xgeva (last received 1/14/2025: And Eliquis 5 mg twice daily.  She has been maintained on treatment with single agent Xeloda since 2/7/2018.      She is seen today with her son present.  She has had a lot of stress going on at home with the passing of her  and trying to figure out insurance and finances.  She also reports having stress related to some family issues.  She admits that she has not been taking her home medications for about a month now.  Her last 2 cycles of Xeloda, she reports only taking 3 days worth of Xeloda and then stopping.  She is concerned Xeloda was making her feel bad, though does admit she does not feel any better when holding her Xeloda.  She has had decreased appetite over the last month, and has lost some weight.  She also reports having abdominal bloating and increased gas.  She reports her bowels have been alternating between normal bowel movement and pudding consistency bowel movements.  She occasionally takes Imodium.  She feels that her skin is overall improved since she has not been taking her Xeloda regularly.  She admits she has not been using any of her emollient creams or triamcinolone creams since December.  She also feels UTI has returned, does admit she did not complete her course of Bactrim last month.  She is having urinary urgency and dysuria.  Fortunately, she is afebrile.  She does continue with pain in her shoulders, this has been  "ongoing since having surgery on her foot.  She has not been taking her Percocet, plans to reach out to her PCP to try tramadol instead.  Denies nausea or vomiting.      Objective   Vital Signs:   /83   Pulse 94   Temp 97.9 °F (36.6 °C) (Oral)   Resp 16   Ht 152.4 cm (60\")   Wt 78.8 kg (173 lb 12.8 oz)   SpO2 96%   BMI 33.94 kg/m²       Physical Exam  Constitutional:       Appearance: She is well-developed.      Comments: Patient is in a wheelchair today   Eyes:      Conjunctiva/sclera: Conjunctivae normal.   Neck:      Thyroid: No thyromegaly.   Cardiovascular:      Rate and Rhythm: Normal rate and regular rhythm.      Heart sounds: No murmur heard.     No friction rub. No gallop.   Pulmonary:      Effort: No respiratory distress.      Breath sounds: Normal breath sounds.   Abdominal:      General: Bowel sounds are normal. There is no distension.      Palpations: Abdomen is soft.      Tenderness: There is no abdominal tenderness.   Musculoskeletal:      Comments: Boot in place left lower extremity   Lymphadenopathy:      Head:      Right side of head: No submandibular adenopathy.      Cervical: No cervical adenopathy.      Upper Body:      Right upper body: No supraclavicular adenopathy.      Left upper body: No supraclavicular adenopathy.   Skin:     General: Skin is warm and dry.      Findings: Rash present.      Comments: Overall the patient's hands have improved compared to prior exam.  The degree of erythema in the palms and dorsal aspect of her hands has diminished.  The degree of sclerodactyly has diminished, mobility in her fingers has improved.  She does have significant dry skin in her palms with some skin cracking, no significant peeling.   Neurological:      Mental Status: She is alert and oriented to person, place, and time.      Cranial Nerves: No cranial nerve deficit.      Motor: No abnormal muscle tone.      Deep Tendon Reflexes: Reflexes normal.   Psychiatric:         Behavior: " Behavior normal.           Result Review : Reviewed CBC, CMP UA from today.     Assessment and Plan     *Previous Stage Ib ([id number]) right breast cancer:  Diagnosed May 2010 with excisional biopsy for invasive ductal carcinoma, 1.3 cm, grade 2, ER 90%, IN 80%, HER-2/peter negative (1+ IHC).    Subsequent right mastectomy in July 2010 with no residual breast malignancy, 1/5 sentinel lymph node with micrometastasis (0.25 mm).    Treated in the [facility] system with adjuvant AC ×4 cycles in 2010 (no taxanes administered due to underlying Charcot-Saloni-Tooth with peripheral neuropathy).    Adjuvant Femara (postmenopausal) initiated October 2010 with plan to treat ×10 years.    Genetic testing reportedly negative.    Developed osteopenia treated with Prolia beginning 2/27/13. Subsequently discontinued due to identification of metastatic disease.    *Recurrent/metastatic disease identified 10/8/17:  Disease involving thoracic spine with cord compression at T6, lumbosacral involvement, sternal and right sternoclavicular involvement.    Femara discontinued in 10/2017.    Radiation administered (in the [facility] system) to the thoracic spine beginning 10/19/17 treating T3-T9 to a dose of 24 gray in 6 fractions.  Evaluation with MRI 12/8/17 showing persistent T6 cord compression with persistent neurologic compromise requiring surgical treatment 12/11/17 with T6 laminectomy/corpectomy and T3-T9 fusion.  Pathology with metastatic carcinoma of breast origin, ER negative, IN negative, HER-2/peter negative (1+ IHC).  Additional staging evaluation 12/8/17 with no evidence of visceral metastatic disease, bone scan showing involvement of thoracic spine, sternum, left humerus, mid frontal bone.  No plane film correlate of left humerus lesion.  MRI lumbar spine with small intradural L3 metastasis.  CA 15-3 12/6/17- 17.  Palliative radiation therapy to L3 dural metastasis and left humerus initiated 1/15/18 (10 fractions), completed  1/26/18.  Hypercalcemia of malignancy with calcium in the 10-11 range.  Initiation of monthly Xgeva 1/23/18.  Baseline CT scan 1/30/18 with no evidence of visceral involvement.  Cluster of nodular opacities in the right lower lobe suspected to be infectious or related to bronchiolitis. Bone scan 1/30/18 showed postsurgical change in the thoracic spine, stable uptake in the frontal bone, no new areas of disease.  Initiation of palliative oral single agent Xeloda 2/7/18 2000 mg a.m., 1500 mg p.m. for 14/21 days.   Following 3 cycles xeloda, bone scan 4/4/18 showed no change from the prior study.  CT scan 4/4/18 showed a small pericardial effusion of unclear significance as well as a subcutaneous nodule in the right lateral chest wall.  Subsequent evaluation with echocardiogram 4/17/18 showed no evidence of pericardial effusion.  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  We discussed the possibility of surgical excision of the right subcutaneous chest wall lesion for more definitive diagnosis.  Reviewed previous CT images dating back to 12/8/17 and the lesion was present even at that time measuring around 1.7 cm although not commented on in the radiology report.  As this appears to be an indolent low-grade process unrelated to her breast cancer, recommendee foregoing surgical excision at this time and monitoring this area on future scans.  The patient agreed.    Following 6 cycles of Xeloda, CT 6/6/18  showed stable findings, no evidence of progressive disease.  There was a comment regarding subcutaneous abnormality in the anterior abdominal wall and this was related to Lovenox injection sites.  Bone scan 6/6/18 showed no interval change.   CT scan and bone scan 8/13/18 following 9 cycles of Xeloda showed stable findings with no evidence of significant visceral metastases.   Her bone lesions appear stable on bone scan.  The spindle cell neoplasm in her right chest wall actually decreased in size from 2 cm down to 1.6 cm.    The patient experienced some symptoms of diarrhea, anorexia, generalized weakness during cycle 9 Xeloda it was unclear whether this was related to a viral gastroenteritis or toxicity from treatment.  Symptoms recurred during cycle 10 and treatment was cut short by 2 days.  Symptoms attributed to Xeloda.  With cycle 11, dose and schedule altered to 1500 mg twice daily for 7 days on followed by 7 days off .  CT scan 9/9/2020 with no significant changes.  Bone scan 9/9/2020 read as unchanged from prior studies however did note an area of slight activity in the medial left femur.  Contacted radiology and although this was not noted on prior reports appears to have been present.  Subsequent MRI left femur 9/21/2020 with cortical thickening and periosteal edema left iliac us muscle insertion to the medial left femur with no evidence of metastatic disease, favored to represent periosteal change secondary to insertional tendinitis.  Severe hand-foot symptoms causing sclerodactyly and limitation in finger movement prompted change in dosing in July 2021 with Xeloda decreased to 1000 mg a.m., 1500 mg p.m. for 7 days on followed by 7 days off.  Patient was experiencing severe hand-foot syndrome related to Xeloda having developed skin peeling, sclerodactyly with limited use of her hands.  On 3/31/2022, Xeloda was held to allow for recovery.  Patient resumed Xeloda on 4/18/2022.  Patient required hospitalization 5/29 - 6/1/2022 with presumed viral gastroenteritis that was exacerbated by Xeloda.  Xeloda held briefly, resumed on 6/6/2022.  Patient again with worsening sclerodactyly, Xeloda held for 2 weeks from 10/3 - 10/17/2022, resumed at prior dose with improvement in symptoms.  Scans on 3/3/2023 with CT chest abdomen pelvis and bone scan showing stable findings.  CT chest  abdomen pelvis 7/3/2023 with questionable 1 mm change in size right lower lobe nodule.  Additional small pulmonary nodules stable.  Stable bony metastatic disease.  Bone scan on 7/7/2023 however showed slight progression focal involvement right ischium and superior pubic ramus (new ischial lesion superior pubic ramus compared to 10/24/2022 and more conspicuous compared to 3/3/2023).  Metastatic lesion right inferior pubic ramus and ischial tuberosity increased in size since October 2022 however stable from 3/3/2023.  MRI cervical spine 7/3/2023 with no metastatic involvement however identification of the lesion at T2, recommended dedicated thoracic spine MRI to evaluate further.  Given the minimal extent and slow degree of progression, elected to continue oral Xeloda and evaluate further with MRI pelvis and thoracic spine.  Consideration of biopsy pelvic metastasis for repeat ER/AZ/HER2/peter testing.    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  CA 15-3 on 7/10/2023 was 12.2, uninformative.  MRI thoracic spine 7/27/2023 with 1 cm metastatic focus seen in L1  MRI pelvis 7/27/2023 with metastatic foci identified right superior pubic ramus, right ischial tuberosity, inferior pubic ramus, L5 body.  Chronic appearing posttraumatic and/or degenerative change in the posterior right ilium adjacent SI joint.  CT-guided biopsy right pubic ramus lesion on 8/31/2023.  Pathology showed no evidence of malignancy, showed markedly calcified and sclerotic mature lamellar bone.  Attempted decalcification but no evidence of malignancy.  CT chest abdomen pelvis 9/8/2023 and bone scan 9/11/2023 with stable findings.  CT chest abdomen pelvis 12/8/2023 with possible slight increase in right chest wall subcutaneous lesion (1.8 x 1.1 up to 1.8 x 1.4 cm) although may be related to altered positioning.  Nonpathologically enlarged right axillary and subpectoral lymph nodes slightly  increased (patient notes she received RSV vaccine right arm just prior to scan).  Bone scan 12/11/2023 with stable findings.  CT chest abdomen pelvis 3/19/2024 with slight interval decrease in size of right axillary and bilateral subpectoral lymph nodes, right subcutaneous soft tissue nodule slightly smaller, stable pulmonary nodules, stable bone metastases.  Bone scan 3/19/2024 with stable findings.  CT chest abdomen pelvis 6/21/2024 showed left subpectoral lymph node to have increased slightly in size (0.9 x 0.6 up to 1.3 x 0.8 cm).  Subcutaneous nodule right chest wall decreased from 1.1 x 1.9 down to 1.1 x 1.4 cm.  Scan otherwise stable.  Bone scan 6/21/2024 with stable findings  CT chest abdomen pelvis 9/23/2024 with resolution of left subpectoral lymphadenopathy, additional findings stable.  Bone scan 9/23/2024 with stable findings.  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  She stopped Xeloda 1 week prior to surgery.  She resumed Xeloda on 1/14/2025 at previous dosing schedule  CT chest abdomen pelvis 2/14/2025 showed stable findings and bone, no evidence of visceral disease.  Bone scan with increased activity right L5/S1 disc space felt to be degenerative with unchanged CT appearance of the vertebral bodies.  Postoperative activity noted left midfoot.  Previous sites of disease activity stable.  3/31/2025: Patient returns today for triage visit.  She is suppose to be on treatment with Xeloda 1000 mg in the a.m., 1500 mg in the p.m. 7 days on followed by 7 days off.  She also continues on Xgeva every 3 months, last received 1/14/2025.  She has been on single agent Xeloda since 2/7/2018.  She is seen for triage visit today due to stomach bloating and intermittent loose stool.  She reports overall feeling unwell.  She stopped taking all of her home medications about 1 month ago.  She has not taken her Xeloda regularly for the last  2 cycles, typically taking 3 days of Xeloda and then discontinuing.  She reports stopping Xeloda because she was worried it was because her did not feel good, however agrees that she did not feel any different when she was on/off the Xeloda.  She reports having significantly decreased appetite and her weight is down.  Her son is present, and they discuss having a lot of social stressors, particularly as they navigate the death of the patient's  last year.  She does feel that a lot of of her symptoms could be related to stress.  Reviewed with Dr. Hancock, we will have patient resume all of her home medications, and follow-up with us in 1 week.  As long as she is doing okay next week, we will have her resume her Xeloda.  Will also get patient set up with  to see if there is anything we can do for financial stressors.  Patient also follows regularly with [facility], will send message to see if she is able to get her in sooner.    *Right pulmonary embolism:  Diagnosed on CT angiogram 10/21/17 involving small right lower lobe pulmonary artery.  Lower extremity Dopplers negative.  Bilateral lower extremity Dopplers negative again 12/5/17.  Received chronic Lovenox 1 mg/kg twice per day, transitioned to oral Eliquis in February 2019, continuing on Eliquis 5 mg twice daily.  Patient continues on Eliquis 5 mg twice daily    *Cancer related pain:  Previously receiving Duragesic 50 µg patch every 72 hours along with Dilaudid 4 mg as needed for breakthrough pain  The patient's pain improved over time and she was able to discontinue both Duragesic and Dilaudid in the interval.  Patient does take occasional Flexeril at bedtime due to back spasm/pain when she has been more active.  The patient does have some occasional aggravation of her chronic back pain and does use tramadol 50 mg every 8 hours as needed  Patient was receiving tramadol 50 mg every 8-12 hours as needed in addition to hydrocodone 5/325 every 4 hours  as needed.  She was also taking OTC NSAIDs.  Patient developed nausea related to hydrocodone and was transitioned to Percocet 5/325 every 4 hours as needed, advised to stop taking NSAIDs with ongoing anticoagulation.  Patient has chronic pain in her shoulders that does wax and wane is unrelated to her malignancy.  She notes that right shoulder pain has been exacerbated following her left foot surgery and she has relied on her arms for transfers and to assist with mobility.  She also has developed some mild pain around her right hip likely related to favoring the right leg when transferring or attempting to ambulate.  She will continue to work with physical therapy.  She was previously receiving tramadol 50 mg every 8 hours and Percocet 5/325 every 4 hours as needed.  She currently has Percocet 7.5/325 prescribed by her PCP, has not been taking this.  Prefers tramadol, so she plans to reach out to her PCP for new prescription.    *Chemotherapy-induced diarrhea with subsequent C. difficile colitis in the setting of previous ulcerative colitis and then identification of enteropathogenic E. coli:  Patient with reported history of ulcerative colitis, continues on mesalamine.  The patient developed initial diarrhea related to Xeloda at regular dosing.  Symptoms improved on reduced dose Xeloda  Flare of symptoms in October 2018 with apparent finding of C. difficile colitis by GI, treated with course of oral vancomycin with improvement in symptoms.  Patient notes minimal intermittent diarrhea/loose stools on Xeloda requiring occasional dosing of Imodium.    Patient required hospitalization 5/29 - 6/1/2022 with presumed viral gastroenteritis that was exacerbated by Xeloda.  Xeloda held briefly, resumed on 6/6/2022.  Patient's  developed C. difficile colitis and patient was experiencing increase in diarrhea.  Stool studies performed 8/4/22 negative C. difficile however GI PCR was positive for enteropathogenic E. coli.   Given patient's symptoms and immunocompromise status it was elected to treat her with azithromycin 500 mg daily x3 days beginning 8/5/2022  Diarrhea resolved  Patient underwent colonoscopy on 2/28/2023 with findings of diverticulosis  Patient had been experiencing intermittent episodes of diarrhea however is now constipated related to narcotics.  We discussed starting that regimen with Senokot 1 to 2 tablets twice daily and adding MiraLAX daily if needed.   3/31/2025: Reports having intermittent diarrhea.  Some days bowels move regularly, other days has what she considers a pudding consistency bowel movement.  On those days, typically only having 1 bowel movement.  She is also complaining of abdominal bloating and increased gas, recommended gas-x.    *Traumatic left tibia/fibular fracture:  Status post ORIF 12/6/17  Specimen was sent for pathologic review, negative for evidence of malignancy    *Hypercalcemia:  Suspect hypercalcemia of malignancy, calcium in  10-11 range previously.  Calcium normalized following initiation of monthly Xgeva on 1/23/18.    *Chemotherapy-induced mucositis:  Patient has not experienced any recent difficulty with mucositis.    *Recurrent UTI, bladder wall thickening on CT:  Patient had an enterococcal UTI on 3/2/18 sensitive to nitrofurantoin and received treatment, unclear how long.  Recurrent UTI 3/20/18 with urine culture growing Klebsiella, initially treated with nitrofurantoin, transitioned to Levaquin.  CT 4/4/18 with diffuse bladder wall thickening with increased nodular thickening at the left base.  Referral to urogynecology Dr. May Johnson.  She was placed on a prophylactic dose of trimethoprim 100 mg daily, bladder wall thickening felt to be related to recent recurrent urinary tract infections.  Patient with urinary symptoms, treated with course of Macrobid at the end of December 2018, urine culture however was negative 12/31/18.  Patient was found to have Klebsiella UTI  7/29/2019 which was successfully treated with Macrobid with complete resolution of symptoms.  CT 8/10/2021 with diffuse bladder wall thickening new from 5/17/2021 (however seen on multiple prior scans).    UTI on 10/18/2021 with E. coli greater than 100,000 colonies that was pansensitive, treated with Macrobid x7 days  With ongoing/recurrent UTIs patient was seen by urogynecology and initiated suppressive therapy with trimethoprim 100 mg daily in December 2021.  She has not experienced any further urinary tract infections.  CT abdomen pelvis 3/28/2022 does show diffuse thickening of urinary bladder as has been seen on prior studies indicative of cystitis.  Patient notes that she did stop taking trimethoprim on a daily basis.    Patient with urine culture on 5/5/2023 that grew E. coli and she received antibiotic treatment from urogynecology.    Urine culture on 8/23/2023 with greater than 100,000 colonies of E. coli resistant to ampicillin and Bactrim.  Received 5-day course of Cipro with resolution of symptoms.  Patient did have UTI with E. coli on culture 10/2/2023, received a course of Augmentin.  3/31/2025: Patient admits she did not complete course of antibiotics for UTI last month.  UA today suspicious for UTI.  Patient reports previously being on Cipro and tolerating this well, so we will send prescription for Cipro while we await culture.    *Charcot-Saloni-Tooth with mobility difficulties:  The patient underwent an intensive course of rehabilitation at [facility].  She graduated from her outpatient course November 2018.  Overall the patient has improved dramatically in terms of mobility,   Patient developed significant callus formation lateral aspect of the left plantar surface.    Patient developed skin erosion and an ulceration on the lateral aspect of her left foot.    Patient required surgery on her left foot by Dr. Lopez on 12/10/2024  Patient is continuing to recover from her left foot surgery in  December.  She is currently in a boot, remains in a wheelchair.  She is starting physical therapy.    *Depression:  The patient is continuing follow-up in the supportive oncology clinic and is currently continuing on Cymbalta 30 mg twice daily as well as gabapentin 900 mg nightly, Ativan 0.5 mg nightly as needed, Provigil 100 mg daily.  Patient does continue to attend support groups at [employer].  The patient does continue routine follow-up in supportive oncology clinic.    Patient does have multiple stressors with her 's malignancy and chemotherapy as well as her autistic son who is living with them at home.  Patient continues to deal with grief related to her 's death.  She has seen a grief counselor through hospice which helped significantly.  She continues routine follow-up with supportive oncology as well.  3/31/2025: Patient has been struggling emotionally over the last month.  She stopped taking all of her home medications about 1 month ago, including her Xeloda.  She is having significantly decreased appetite, and has lost some weight.  She is tearful today, she discusses a lot of social stressors going on related to family and her 's death.  Will refer to  for financial resources.  She follows regularly with [facility], will send message to see if [facility] is able to get her in sooner.  Otherwise, recommended patient resume her home medications.    *Hand-foot syndrome secondary to Xeloda:  Patient continues with frequent application of emollient cream to the hands and feet  Symptoms increased significantly requiring a 1 week delay in cycle 18 Xeloda as noted above.  Symptoms did improve and she continued on the same dose.    Patient was referred to dermatology and has been continuing on triamcinolone 0.1% cream used for 1 week on followed by 1 week off which led to some further improvement.   Progressive palmar erythema with development of sclerodactyly and effect on dexterity.   Addition of urea-based cream 3 times daily.  Patient with continued difficulty regarding erythema of her hands that extended onto the dorsal aspect and was causing contractures/sclerodactyly in her fingers affecting her dexterity.  In July 2021, held Xeloda for an additional week and then reduced dose from 1500 mg twice daily down to 1000 mg a.m. and 1500 mg p.m. and continued on a 7-day on followed by 7-day off schedule.  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  On 3/31/2022, Xeloda was held to allow for recovery.  Patient resumed Xeloda on 4/18/2022.  Patient developed worsening sclerodactyly affecting dexterity that required Xeloda to again be briefly held for 2 weeks from 10/3 - 10/17/2022.  Treatment resumed at prior dose after improvement in symptoms.  3/31/2025: Patient admits she has not been using emollient cream or topical triamcinolone since December.  She has not been taking Xeloda regularly over the last month.  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  The degree of erythema on the dorsal aspect of the hand has diminished.  She does have some degree of dry skin and skin cracking in her palms and was encouraged to resume using her emollient cream frequently, especially since we are hoping to resume Xeloda next week.    *Evidence of steatosis on scans with mild intermittent elevated liver function studies:  Liver function studies increased 8/20/19 with ALT 98, AST 70, normal total bilirubin.  Negative viral hepatitis A, B, and C panel 8/23/18  Likely related to hepatic steatosis.   Subsequent improvement in LFTs  LFTs today are normal    *Chemotherapy  induced leukopenia:  WBC today is normal at 6.29.    *GERD, question of celiac disease:  Patient with significant history of reflux  Patient currently receiving Protonix 40 mg daily daily and Carafate 1 g 4 times daily as needed  Patient required hospitalization 5/29 - 6/1/2022 with presumed viral gastroenteritis that was exacerbated by Xeloda.    EGD performed 5/31/2022 during hospitalization with biopsy that showed focal blunting of villi and atrophy with slight increase in intraepithelial lymphocytes.  Changes were minimal but recommended correlation with serology to exclude celiac disease.  Celiac serology 8/8/2022 negative  Patient previously developed worsening reflux symptoms, temporarily increase Protonix to 40 mg twice daily and we did add Carafate 1 g 4 times daily.    She is taking Protonix 40 mg once daily, is not taking Carafate.    *Insomnia:  Patient with prior paradoxical reaction to Benadryl  Improved previously on temazepam 15 mg nightly as needed.   Patient noted subsequently that temazepam was having no effect.   Patient continuing on gabapentin 900 mg nightly    *Health maintenance:  Patient notes that she has a history of colon polyps as well as ulcerative colitis and was due for a follow-up colonoscopy on 9/12/2019.  We did discuss there is no necessity to pursue colonoscopy in the setting of her metastatic breast cancer.    The patient did undergo colonoscopy on 2/7/2020 with findings of muscular hypertrophy and diverticulosis.   Patient did wish to proceed with further colonoscopic evaluation, performed on 12/20/2022 with poor prep.  Repeat 2/28/2023 with diverticulosis.    *Bilateral shoulder pain:  Chronic difficulty with right shoulder although she has not complained of this in prior visits to our office.  She reported difficulty with abduction.    The patient did undergo MRI of her right shoulder on 11/21/2019 at an outside facility showing multiple abnormalities including supraspinatus  tendinosis, labral tear but no evidence of metastatic disease.  Patient developed pain in the left shoulder, was seen by orthopedics and underwent steroid injection with some improvement.  Right shoulder stable.  Patient did undergo physical therapy with some improvement.  She continues to use tramadol 50 mg every 8-12 hours as needed.  Note that current CT 10/20/2022 made notation of left shoulder probable bursitis.    Patient continues with bilateral shoulder pain which does wax and wane.  Recently right shoulder pain has been more problematic, exacerbated by using her arms to assist with transfers and ability after foot surgery    *Ocular changes in part related to Xeloda:  Patient experienced a mild degree of blurred vision as well as burning and pruritus.  She was seen by her ophthalmologist and was placed on xiidra ophthalmic drops which have helped.  Likely both issues are to some extent related to Xeloda.  Symptoms did worsen and patient was seen by ophthalmologist at [facility].  She is now using an ocular lubricant at night in addition to artificial tears which have helped.  Symptoms currently stable to improved    *Elevated glucose:  It is noted the patient's glucose at the last few visits has been in the high 100 range, postprandial.  She has had a hemoglobin A1c that has been in the high 5-6 range in the past  Hemoglobin A1c was last checked on 12/3/2024 at 5.8    *Possible loosening of pedicle screw at T3:  CT cervical spine 5/17/2021 showed loosening of the left pedicle screw at T3.  Patient referred to orthopedics and was seen by Dr. Nayak who felt that there were no concerning findings on the scan    *Iron deficiency anemia  Labs on 5/2/2022 with hemoglobin 10.8.  Additional labs with iron 48, ferritin 21.2, iron saturation 11%, TIBC 4 and 32, folate greater than 20, B12 greater than 2000.    Attempted treatment with oral iron daily however patient was intolerant  Patient subsequently  received Venofer 300 mg weekly x4 beginning 6/6/2022 and completed on 6/27/2022  Subsequent iron studies on 7/11/2022 showed improvement with iron 62, ferritin 345, iron saturation 18%, TIBC 336.  Hemoglobin had improved up to 12.7 at that time.  Repeat iron studies on 10/3/2022 showed iron replete status with hemoglobin 13.7, iron 121, ferritin 208.7, iron saturation 31%, TIBC 392.  Hemoglobin currently 11.7.  Will check iron labs next week.     Plan:   Recommended patient resume all of her routine home medications.  We will have her continue to hold Xeloda for now.  Return in 1 week to see how patient is doing after resuming home medications.  As long as patient is doing okay at that time, we will have her resume her Xeloda.  Treatment: palliative single agent Xeloda 1000 mg a.m., 1500 mg in the p.m. 7 days on followed by 7 days off (patient has been on single agent Xeloda since 2/7/2018)  Check iron labs next week, orders placed today.  Start Cipro 150 mg 1 tablet twice daily x 3 days.  Prescription sent to pharmacy today.  Will change antibiotic pending culture.  Referral placed to oncology social worker for financial resources.  Message sent to Jade Brunner to update her on patient condition and see if she is able to see patient for sooner visit.  Patient continues on every 3-month Xgeva last administered 1/14/2025 and due next on 4/14/2025  Continue Eliquis 5 mg twice daily  The patient will resume frequent use of emollient cream currently 5 times daily and continue periodic triamcinolone cream 0.1% twice daily (provided by dermatology) 2 weeks on followed by 2 weeks off as prescribed  Resume Protonix 40 mg daily  Resume ocular lubricating gel nightly per ophthalmology  Resume Provigil 100 mg daily and Cymbalta 30 mg twice daily.  Patient continues routine follow-up with supportive oncology   Patient will resume gabapentin 900 mg at night.   Patient continues on tramadol 50 mg every 8 hours as needed for  pain  Patient has not been utilizing Percocet 7.5/325 every 8 hours as needed for pain  NP visit on 4/14/2025 with CBC, CMP, magnesium, phosphorus.  Patient will be due for 3-month dose of Xgeva  MD visit in mid May 2025 with CBC, CMP.  We will discuss timeframe for follow-up scans.     Patient continuing on high risk medication requiring intensive monitoring.   Patient reviewed with Dr. Hancock.    I spent 55 minutes caring for Martha on this date of service. This time includes time spent by me in the following activities: preparing for the visit, reviewing tests, obtaining and/or reviewing a separately obtained history, performing a medically appropriate examination and/or evaluation, counseling and educating the patient/family/caregiver, ordering medications, tests, or procedures, documenting information in the medical record, and care coordination.

## 2025-03-31 NOTE — TELEPHONE ENCOUNTER
"Returned call to patient. She stated that she has not been able to take her Xeloda as directed for the past 2 cycles. She has been having difficulty with her bowels recently, including frequent loose stools and bloating. She stated her bowels \"don't feel right\". The bloating is causing her appetite to be poor, and she said she is not hydrating properly either. Patient feels like she may have a urinary tract infection as well. Discussed with Reva GONZALEZ and we will have patient come in for a triage visit this afternoon to further address her concerns. Patient v/u.  "

## 2025-03-31 NOTE — TELEPHONE ENCOUNTER
Caller: Martha Davey    Relationship: Self    Best call back number: [phone]     What is the best time to reach you: ASAP    Who are you requesting to speak with (clinical staff, provider,  specific staff member): CLINICAL        What was the call regarding: PLEASE CALL PT TO DISCUSS HER SYMPTOMS AND ADVISE.  SHE TAKES CHEMO EVERY OTHER WEEK BUT SAYS SHE HASN'T BEEN ABLE TO TAKE FOR TWO CYCLES, SHE IS NOT EATING WELL, HAS HAD DIARRHEA AND GETS BLOATED, FEELS THAT SOMETHING IS NOT RIGHT AND MAY NOT BE ABLE TO WAIT UNTIL 4/14 APPT TO BE SEEN.

## 2025-04-02 LAB — BACTERIA SPEC AEROBE CULT: ABNORMAL

## 2025-04-03 ENCOUNTER — TELEPHONE (OUTPATIENT)
Dept: INTERNAL MEDICINE | Facility: CLINIC | Age: 65
End: 2025-04-03

## 2025-04-03 NOTE — TELEPHONE ENCOUNTER
Caller: Martha Davey    Relationship: Self    Best call back number: [phone]     What medication are you requesting: TRAMADOL    What are your current symptoms: PAIN IN SHOULDERS AND HIPS     Have you had these symptoms before:    [x] Yes  [] No    Have you been treated for these symptoms before:   [x] Yes  [] No    If a prescription is needed, what is your preferred pharmacy and phone number: [facility] [id number] - [facility] [address] AT [facility] [phone] [facility] [phone]

## 2025-04-03 NOTE — TELEPHONE ENCOUNTER
Patient needs appointment.  There is no controlled substance agreement on file and it does not look like she has recently filled this medication.

## 2025-04-03 NOTE — TELEPHONE ENCOUNTER
Spoke with patient and informed her she will need to make an appointment with Dr. Chanel. Patient verbalized understanding.

## 2025-04-07 ENCOUNTER — LAB (OUTPATIENT)
Dept: OTHER | Facility: HOSPITAL | Age: 65
End: 2025-04-07
Payer: MEDICARE

## 2025-04-07 ENCOUNTER — OFFICE VISIT (OUTPATIENT)
Dept: ONCOLOGY | Facility: CLINIC | Age: 65
End: 2025-04-07
Payer: MEDICARE

## 2025-04-07 VITALS
DIASTOLIC BLOOD PRESSURE: 82 MMHG | HEIGHT: 60 IN | OXYGEN SATURATION: 98 % | WEIGHT: 182.4 LBS | RESPIRATION RATE: 16 BRPM | HEART RATE: 96 BPM | BODY MASS INDEX: 35.81 KG/M2 | TEMPERATURE: 98.2 F | SYSTOLIC BLOOD PRESSURE: 140 MMHG

## 2025-04-07 DIAGNOSIS — G60.0 CHARCOT-MARIE-TOOTH DISEASE: ICD-10-CM

## 2025-04-07 DIAGNOSIS — C50.811 MALIGNANT NEOPLASM OF OVERLAPPING SITES OF RIGHT BREAST IN FEMALE, ESTROGEN RECEPTOR NEGATIVE: ICD-10-CM

## 2025-04-07 DIAGNOSIS — C50.811 MALIGNANT NEOPLASM OF OVERLAPPING SITES OF RIGHT BREAST IN FEMALE, ESTROGEN RECEPTOR NEGATIVE: Primary | ICD-10-CM

## 2025-04-07 DIAGNOSIS — C50.919 PRIMARY MALIGNANT NEOPLASM OF BREAST WITH METASTASIS: ICD-10-CM

## 2025-04-07 DIAGNOSIS — M25.519 SHOULDER PAIN, UNSPECIFIED CHRONICITY, UNSPECIFIED LATERALITY: ICD-10-CM

## 2025-04-07 DIAGNOSIS — Z17.1 MALIGNANT NEOPLASM OF OVERLAPPING SITES OF RIGHT BREAST IN FEMALE, ESTROGEN RECEPTOR NEGATIVE: Primary | ICD-10-CM

## 2025-04-07 DIAGNOSIS — Z17.1 MALIGNANT NEOPLASM OF OVERLAPPING SITES OF RIGHT BREAST IN FEMALE, ESTROGEN RECEPTOR NEGATIVE: ICD-10-CM

## 2025-04-07 DIAGNOSIS — D50.8 OTHER IRON DEFICIENCY ANEMIA: ICD-10-CM

## 2025-04-07 LAB
ALBUMIN SERPL-MCNC: 3.7 G/DL (ref 3.5–5.2)
ALBUMIN/GLOB SERPL: 1.1 G/DL
ALP SERPL-CCNC: 71 U/L (ref 39–117)
ALT SERPL W P-5'-P-CCNC: 6 U/L (ref 1–33)
ANION GAP SERPL CALCULATED.3IONS-SCNC: 9.3 MMOL/L (ref 5–15)
AST SERPL-CCNC: 12 U/L (ref 1–32)
BASOPHILS # BLD AUTO: 0.04 10*3/MM3 (ref 0–0.2)
BASOPHILS NFR BLD AUTO: 0.6 % (ref 0–1.5)
BILIRUB SERPL-MCNC: 0.2 MG/DL (ref 0–1.2)
BUN SERPL-MCNC: 19 MG/DL (ref 8–23)
BUN/CREAT SERPL: 24.7 (ref 7–25)
CALCIUM SPEC-SCNC: 9.9 MG/DL (ref 8.6–10.5)
CHLORIDE SERPL-SCNC: 100 MMOL/L (ref 98–107)
CO2 SERPL-SCNC: 30.7 MMOL/L (ref 22–29)
CREAT SERPL-MCNC: 0.77 MG/DL (ref 0.57–1)
DEPRECATED RDW RBC AUTO: 62.1 FL (ref 37–54)
EGFRCR SERPLBLD CKD-EPI 2021: 86.3 ML/MIN/1.73
EOSINOPHIL # BLD AUTO: 0.15 10*3/MM3 (ref 0–0.4)
EOSINOPHIL NFR BLD AUTO: 2.2 % (ref 0.3–6.2)
ERYTHROCYTE [DISTWIDTH] IN BLOOD BY AUTOMATED COUNT: 19.2 % (ref 12.3–15.4)
FERRITIN SERPL-MCNC: 60.7 NG/ML (ref 13–150)
GLOBULIN UR ELPH-MCNC: 3.5 GM/DL
GLUCOSE SERPL-MCNC: 163 MG/DL (ref 65–99)
HCT VFR BLD AUTO: 31.8 % (ref 34–46.6)
HGB BLD-MCNC: 9.9 G/DL (ref 12–15.9)
IMM GRANULOCYTES # BLD AUTO: 0.01 10*3/MM3 (ref 0–0.05)
IMM GRANULOCYTES NFR BLD AUTO: 0.1 % (ref 0–0.5)
IRON 24H UR-MRATE: 17 MCG/DL (ref 37–145)
IRON SATN MFR SERPL: 5 % (ref 20–50)
LYMPHOCYTES # BLD AUTO: 1.3 10*3/MM3 (ref 0.7–3.1)
LYMPHOCYTES NFR BLD AUTO: 19.5 % (ref 19.6–45.3)
MCH RBC QN AUTO: 27.5 PG (ref 26.6–33)
MCHC RBC AUTO-ENTMCNC: 31.1 G/DL (ref 31.5–35.7)
MCV RBC AUTO: 88.3 FL (ref 79–97)
MONOCYTES # BLD AUTO: 0.61 10*3/MM3 (ref 0.1–0.9)
MONOCYTES NFR BLD AUTO: 9.1 % (ref 5–12)
NEUTROPHILS NFR BLD AUTO: 4.56 10*3/MM3 (ref 1.7–7)
NEUTROPHILS NFR BLD AUTO: 68.5 % (ref 42.7–76)
NRBC BLD AUTO-RTO: 0 /100 WBC (ref 0–0.2)
PLATELET # BLD AUTO: 366 10*3/MM3 (ref 140–450)
PMV BLD AUTO: 10 FL (ref 6–12)
POTASSIUM SERPL-SCNC: 4.2 MMOL/L (ref 3.5–5.2)
PROT SERPL-MCNC: 7.2 G/DL (ref 6–8.5)
RBC # BLD AUTO: 3.6 10*6/MM3 (ref 3.77–5.28)
SODIUM SERPL-SCNC: 140 MMOL/L (ref 136–145)
TIBC SERPL-MCNC: 353 MCG/DL (ref 298–536)
TRANSFERRIN SERPL-MCNC: 237 MG/DL (ref 200–360)
WBC NRBC COR # BLD AUTO: 6.67 10*3/MM3 (ref 3.4–10.8)

## 2025-04-07 PROCEDURE — 36415 COLL VENOUS BLD VENIPUNCTURE: CPT

## 2025-04-07 PROCEDURE — 80053 COMPREHEN METABOLIC PANEL: CPT | Performed by: NURSE PRACTITIONER

## 2025-04-07 PROCEDURE — 83540 ASSAY OF IRON: CPT | Performed by: NURSE PRACTITIONER

## 2025-04-07 PROCEDURE — 85025 COMPLETE CBC W/AUTO DIFF WBC: CPT | Performed by: NURSE PRACTITIONER

## 2025-04-07 PROCEDURE — 84466 ASSAY OF TRANSFERRIN: CPT | Performed by: NURSE PRACTITIONER

## 2025-04-07 PROCEDURE — 82728 ASSAY OF FERRITIN: CPT | Performed by: NURSE PRACTITIONER

## 2025-04-07 RX ORDER — TRAMADOL HYDROCHLORIDE 50 MG/1
50 TABLET ORAL EVERY 8 HOURS PRN
Qty: 60 TABLET | Refills: 0 | Status: SHIPPED | OUTPATIENT
Start: 2025-04-07

## 2025-04-07 NOTE — PROGRESS NOTES
Chief Complaint  Previous Stage Ib ([id number]) ER/MA positive, HER-2/peter negative right breast cancer with subsequent metastatic disease identified 10/8/2017, history of right pulmonary embolism, cancer related pain, chemotherapy-induced diarrhea, chemotherapy-induced mucositis, chemotherapy-induced hand-foot syndrome    Subjective        History of Present Illness  Martha is seen back today in close follow-up.  She was seen last week as a triage visit.  Patient was previously taking Xeloda 1000 mg in the morning, 1500 mg in the evening 7 days on followed by 7 days off as well as every 3-month Xgeva (last received 1/14/2025), and Eliquis 5 mg twice daily.  She has been maintained on treatment with single agent Xeloda since 2/7/2018.  It was discovered at that visit last week that she was not taking most of her medications.  Patient has been overwhelmed since the passing of her  and with some personal financial and family stressors as well.  She was also struggling with symptoms of UTI though admittedly had not completed a prior course of Bactrim that had been prescribed.  We had her continue to hold Xeloda but did give her Cipro for urinary tract symptoms.  She was encouraged to start back on all her other previously held medications including Cymbalta.    As she is reviewed back today she notes resolution of UTI symptoms.  She continues to have significant pain in her shoulders which is not new but becoming increasingly bothersome to her.  She is wondering about referral to pain management which seems reasonable at this point.    She will go ahead and restart her Xeloda today as otherwise she is doing better mentally.  She is also scheduled to follow-up with supportive oncology, LISA Lofton, tomorrow thankfully.    Finally, her hemoglobin was noted to be dropping last week and we did obtain iron studies today which unfortunately show that she is becoming iron deficient again with iron sat down to  "5%.  Hemoglobin is 9.9 today.  Will therefore look into giving her IV iron when she returns next week.    Objective   Vital Signs:   /82   Pulse 96   Temp 98.2 °F (36.8 °C) (Oral)   Resp 16   Ht 152.4 cm (60\")   Wt 82.7 kg (182 lb 6.4 oz)   SpO2 98%   BMI 35.62 kg/m²       Physical Exam  Constitutional:       Appearance: She is well-developed.      Comments: Ambulating with rolling walker today   Eyes:      Conjunctiva/sclera: Conjunctivae normal.   Neck:      Thyroid: No thyromegaly.   Cardiovascular:      Rate and Rhythm: Normal rate and regular rhythm.      Heart sounds: No murmur heard.     No friction rub. No gallop.   Pulmonary:      Effort: No respiratory distress.      Breath sounds: Normal breath sounds.   Abdominal:      General: Bowel sounds are normal. There is no distension.      Palpations: Abdomen is soft.      Tenderness: There is no abdominal tenderness.   Musculoskeletal:      Comments: Boot in place left lower extremity   Lymphadenopathy:      Head:      Right side of head: No submandibular adenopathy.      Cervical: No cervical adenopathy.      Upper Body:      Right upper body: No supraclavicular adenopathy.      Left upper body: No supraclavicular adenopathy.   Skin:     General: Skin is warm and dry.      Findings: Rash present.      Comments: Overall the patient's hands have improved compared to prior exam.  The degree of erythema in the palms and dorsal aspect of her hands has diminished.  The degree of sclerodactyly has diminished, mobility in her fingers has improved.  She does have significant dry skin in her palms with some skin cracking, no significant peeling.   Neurological:      Mental Status: She is alert and oriented to person, place, and time.      Cranial Nerves: No cranial nerve deficit.      Motor: No abnormal muscle tone.      Deep Tendon Reflexes: Reflexes normal.   Psychiatric:         Behavior: Behavior normal.           Result Review :   Results from last 7 " days   Lab Units 04/07/25  1557   WBC 10*3/mm3 6.67   NEUTROS ABS 10*3/mm3 4.56   HEMOGLOBIN g/dL 9.9*   HEMATOCRIT % 31.8*   PLATELETS 10*3/mm3 366     Results from last 7 days   Lab Units 04/07/25  1557   SODIUM mmol/L 140   POTASSIUM mmol/L 4.2   CHLORIDE mmol/L 100   CO2 mmol/L 30.7*   BUN mg/dL 19   CREATININE mg/dL 0.77   CALCIUM mg/dL 9.9   ALBUMIN g/dL 3.7   BILIRUBIN mg/dL 0.2   ALK PHOS U/L 71   ALT (SGPT) U/L 6   AST (SGOT) U/L 12   GLUCOSE mg/dL 163*   FERRITIN ng/mL 60.70   IRON mcg/dL 17*   TIBC mcg/dL 353              Assessment and Plan     *Previous Stage Ib ([id number]) right breast cancer:  Diagnosed May 2010 with excisional biopsy for invasive ductal carcinoma, 1.3 cm, grade 2, ER 90%, LA 80%, HER-2/peter negative (1+ IHC).    Subsequent right mastectomy in July 2010 with no residual breast malignancy, 1/5 sentinel lymph node with micrometastasis (0.25 mm).    Treated in the [facility] system with adjuvant AC ×4 cycles in 2010 (no taxanes administered due to underlying Charcot-Saloni-Tooth with peripheral neuropathy).    Adjuvant Femara (postmenopausal) initiated October 2010 with plan to treat ×10 years.    Genetic testing reportedly negative.    Developed osteopenia treated with Prolia beginning 2/27/13. Subsequently discontinued due to identification of metastatic disease.    *Recurrent/metastatic disease identified 10/8/17:  Disease involving thoracic spine with cord compression at T6, lumbosacral involvement, sternal and right sternoclavicular involvement.    Femara discontinued in 10/2017.    Radiation administered (in the [facility] system) to the thoracic spine beginning 10/19/17 treating T3-T9 to a dose of 24 gray in 6 fractions.  Evaluation with MRI 12/8/17 showing persistent T6 cord compression with persistent neurologic compromise requiring surgical treatment 12/11/17 with T6 laminectomy/corpectomy and T3-T9 fusion.  Pathology with metastatic carcinoma of breast origin, ER negative, LA  negative, HER-2/peter negative (1+ IHC).  Additional staging evaluation 12/8/17 with no evidence of visceral metastatic disease, bone scan showing involvement of thoracic spine, sternum, left humerus, mid frontal bone.  No plane film correlate of left humerus lesion.  MRI lumbar spine with small intradural L3 metastasis.  CA 15-3 12/6/17- 17.  Palliative radiation therapy to L3 dural metastasis and left humerus initiated 1/15/18 (10 fractions), completed 1/26/18.  Hypercalcemia of malignancy with calcium in the 10-11 range.  Initiation of monthly Xgeva 1/23/18.  Baseline CT scan 1/30/18 with no evidence of visceral involvement.  Cluster of nodular opacities in the right lower lobe suspected to be infectious or related to bronchiolitis. Bone scan 1/30/18 showed postsurgical change in the thoracic spine, stable uptake in the frontal bone, no new areas of disease.  Initiation of palliative oral single agent Xeloda 2/7/18 2000 mg a.m., 1500 mg p.m. for 14/21 days.   Following 3 cycles xeloda, bone scan 4/4/18 showed no change from the prior study.  CT scan 4/4/18 showed a small pericardial effusion of unclear significance as well as a subcutaneous nodule in the right lateral chest wall.  Subsequent evaluation with echocardiogram 4/17/18 showed no evidence of pericardial effusion.  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  We discussed the possibility of surgical excision of the right subcutaneous chest wall lesion for more definitive diagnosis.  Reviewed previous CT images dating back to 12/8/17 and the lesion was present even at that time measuring around 1.7 cm although not commented on in the radiology report.  As this appears to be an indolent low-grade process unrelated to her breast cancer, recommendee foregoing surgical excision at this time and monitoring this area on  future scans.  The patient agreed.    Following 6 cycles of Xeloda, CT 6/6/18  showed stable findings, no evidence of progressive disease.  There was a comment regarding subcutaneous abnormality in the anterior abdominal wall and this was related to Lovenox injection sites.  Bone scan 6/6/18 showed no interval change.   CT scan and bone scan 8/13/18 following 9 cycles of Xeloda showed stable findings with no evidence of significant visceral metastases.  Her bone lesions appear stable on bone scan.  The spindle cell neoplasm in her right chest wall actually decreased in size from 2 cm down to 1.6 cm.    The patient experienced some symptoms of diarrhea, anorexia, generalized weakness during cycle 9 Xeloda it was unclear whether this was related to a viral gastroenteritis or toxicity from treatment.  Symptoms recurred during cycle 10 and treatment was cut short by 2 days.  Symptoms attributed to Xeloda.  With cycle 11, dose and schedule altered to 1500 mg twice daily for 7 days on followed by 7 days off .  CT scan 9/9/2020 with no significant changes.  Bone scan 9/9/2020 read as unchanged from prior studies however did note an area of slight activity in the medial left femur.  Contacted radiology and although this was not noted on prior reports appears to have been present.  Subsequent MRI left femur 9/21/2020 with cortical thickening and periosteal edema left iliac us muscle insertion to the medial left femur with no evidence of metastatic disease, favored to represent periosteal change secondary to insertional tendinitis.  Severe hand-foot symptoms causing sclerodactyly and limitation in finger movement prompted change in dosing in July 2021 with Xeloda decreased to 1000 mg a.m., 1500 mg p.m. for 7 days on followed by 7 days off.  Patient was experiencing severe hand-foot syndrome related to Xeloda having developed skin peeling, sclerodactyly with limited use of her hands.  On 3/31/2022, Xeloda was held to allow for  recovery.  Patient resumed Xeloda on 4/18/2022.  Patient required hospitalization 5/29 - 6/1/2022 with presumed viral gastroenteritis that was exacerbated by Xeloda.  Xeloda held briefly, resumed on 6/6/2022.  Patient again with worsening sclerodactyly, Xeloda held for 2 weeks from 10/3 - 10/17/2022, resumed at prior dose with improvement in symptoms.  Scans on 3/3/2023 with CT chest abdomen pelvis and bone scan showing stable findings.  CT chest abdomen pelvis 7/3/2023 with questionable 1 mm change in size right lower lobe nodule.  Additional small pulmonary nodules stable.  Stable bony metastatic disease.  Bone scan on 7/7/2023 however showed slight progression focal involvement right ischium and superior pubic ramus (new ischial lesion superior pubic ramus compared to 10/24/2022 and more conspicuous compared to 3/3/2023).  Metastatic lesion right inferior pubic ramus and ischial tuberosity increased in size since October 2022 however stable from 3/3/2023.  MRI cervical spine 7/3/2023 with no metastatic involvement however identification of the lesion at T2, recommended dedicated thoracic spine MRI to evaluate further.  Given the minimal extent and slow degree of progression, elected to continue oral Xeloda and evaluate further with MRI pelvis and thoracic spine.  Consideration of biopsy pelvic metastasis for repeat ER/ND/HER2/peter testing.    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  CA 15-3 on 7/10/2023 was 12.2, uninformative.  MRI thoracic spine 7/27/2023 with 1 cm metastatic focus seen in L1  MRI pelvis 7/27/2023 with metastatic foci identified right superior pubic ramus, right ischial tuberosity, inferior pubic ramus, L5 body.  Chronic appearing posttraumatic and/or degenerative change in the posterior right ilium adjacent SI joint.  CT-guided biopsy right pubic ramus lesion on 8/31/2023.  Pathology showed no evidence of malignancy, showed markedly  calcified and sclerotic mature lamellar bone.  Attempted decalcification but no evidence of malignancy.  CT chest abdomen pelvis 9/8/2023 and bone scan 9/11/2023 with stable findings.  CT chest abdomen pelvis 12/8/2023 with possible slight increase in right chest wall subcutaneous lesion (1.8 x 1.1 up to 1.8 x 1.4 cm) although may be related to altered positioning.  Nonpathologically enlarged right axillary and subpectoral lymph nodes slightly increased (patient notes she received RSV vaccine right arm just prior to scan).  Bone scan 12/11/2023 with stable findings.  CT chest abdomen pelvis 3/19/2024 with slight interval decrease in size of right axillary and bilateral subpectoral lymph nodes, right subcutaneous soft tissue nodule slightly smaller, stable pulmonary nodules, stable bone metastases.  Bone scan 3/19/2024 with stable findings.  CT chest abdomen pelvis 6/21/2024 showed left subpectoral lymph node to have increased slightly in size (0.9 x 0.6 up to 1.3 x 0.8 cm).  Subcutaneous nodule right chest wall decreased from 1.1 x 1.9 down to 1.1 x 1.4 cm.  Scan otherwise stable.  Bone scan 6/21/2024 with stable findings  CT chest abdomen pelvis 9/23/2024 with resolution of left subpectoral lymphadenopathy, additional findings stable.  Bone scan 9/23/2024 with stable findings.  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  She stopped Xeloda 1 week prior to surgery.  She resumed Xeloda on 1/14/2025 at previous dosing schedule  CT chest abdomen pelvis 2/14/2025 showed stable findings and bone, no evidence of visceral disease.  Bone scan with increased activity right L5/S1 disc space felt to be degenerative with unchanged CT appearance of the vertebral bodies.  Postoperative activity noted left midfoot.  Previous sites of disease activity stable.  3/31/2025: Patient returns today for triage visit.  She is suppose to be on treatment with  Xeloda 1000 mg in the a.m., 1500 mg in the p.m. 7 days on followed by 7 days off.  She also continues on Xgeva every 3 months, last received 1/14/2025.  She has been on single agent Xeloda since 2/7/2018.  She is seen for triage visit today due to stomach bloating and intermittent loose stool.  She reports overall feeling unwell.  She stopped taking all of her home medications about 1 month ago.  She has not taken her Xeloda regularly for the last 2 cycles, typically taking 3 days of Xeloda and then discontinuing.  She reports stopping Xeloda because she was worried it was because her did not feel good, however agrees that she did not feel any different when she was on/off the Xeloda.  She reports having significantly decreased appetite and her weight is down.  Her son is present, and they discuss having a lot of social stressors, particularly as they navigate the death of the patient's  last year.  She does feel that a lot of of her symptoms could be related to stress.  Reviewed with Dr. Hancock, we will have patient resume all of her home medications, and follow-up with us in 1 week.  As long as she is doing okay next week, we will have her resume her Xeloda.  Will also get patient set up with  to see if there is anything we can do for financial stressors.  Patient also follows regularly with Jade, will send message to see if she is able to get her in sooner.  4/7/2025 patient to restart Xeloda today.  She has follow-up with supportive oncology tomorrow.  I will see her back in additional close follow-up next week when she will be due for Xgeva, being given every 3 months.    *Right pulmonary embolism:  Diagnosed on CT angiogram 10/21/17 involving small right lower lobe pulmonary artery.  Lower extremity Dopplers negative.  Bilateral lower extremity Dopplers negative again 12/5/17.  Received chronic Lovenox 1 mg/kg twice per day, transitioned to oral Eliquis in February 2019, continuing on  Eliquis 5 mg twice daily.  Patient continues on Eliquis 5 mg twice daily    *Cancer related pain:  Previously receiving Duragesic 50 µg patch every 72 hours along with Dilaudid 4 mg as needed for breakthrough pain  The patient's pain improved over time and she was able to discontinue both Duragesic and Dilaudid in the interval.  Patient does take occasional Flexeril at bedtime due to back spasm/pain when she has been more active.  The patient does have some occasional aggravation of her chronic back pain and does use tramadol 50 mg every 8 hours as needed  Patient was receiving tramadol 50 mg every 8-12 hours as needed in addition to hydrocodone 5/325 every 4 hours as needed.  She was also taking OTC NSAIDs.  Patient developed nausea related to hydrocodone and was transitioned to Percocet 5/325 every 4 hours as needed, advised to stop taking NSAIDs with ongoing anticoagulation.  4/7/2025 patient continues with chronic pain in her shoulders.  Agreeable today to refill her tramadol but patient would like to be referred to pain management which seems appropriate for this ongoing significant pain that is multifaceted.    *Chemotherapy-induced diarrhea with subsequent C. difficile colitis in the setting of previous ulcerative colitis and then identification of enteropathogenic E. coli:  Patient with reported history of ulcerative colitis, continues on mesalamine.  The patient developed initial diarrhea related to Xeloda at regular dosing.  Symptoms improved on reduced dose Xeloda  Flare of symptoms in October 2018 with apparent finding of C. difficile colitis by GI, treated with course of oral vancomycin with improvement in symptoms.  Patient notes minimal intermittent diarrhea/loose stools on Xeloda requiring occasional dosing of Imodium.    Patient required hospitalization 5/29 - 6/1/2022 with presumed viral gastroenteritis that was exacerbated by Xeloda.  Xeloda held briefly, resumed on 6/6/2022.  Patient's   developed C. difficile colitis and patient was experiencing increase in diarrhea.  Stool studies performed 8/4/22 negative C. difficile however GI PCR was positive for enteropathogenic E. coli.  Given patient's symptoms and immunocompromise status it was elected to treat her with azithromycin 500 mg daily x3 days beginning 8/5/2022  Diarrhea resolved  Patient underwent colonoscopy on 2/28/2023 with findings of diverticulosis  Patient had been experiencing intermittent episodes of diarrhea however is now constipated related to narcotics.  We discussed starting that regimen with Senokot 1 to 2 tablets twice daily and adding MiraLAX daily if needed.   3/31/2025: Reports having intermittent diarrhea.  Some days bowels move regularly, other days has what she considers a pudding consistency bowel movement.  On those days, typically only having 1 bowel movement.  She is also complaining of abdominal bloating and increased gas, recommended gas-x.    *Traumatic left tibia/fibular fracture:  Status post ORIF 12/6/17  Specimen was sent for pathologic review, negative for evidence of malignancy    *Hypercalcemia:  Suspect hypercalcemia of malignancy, calcium in  10-11 range previously.  Calcium normalized following initiation of monthly Xgeva on 1/23/18.    *Chemotherapy-induced mucositis:  Patient has not experienced any recent difficulty with mucositis.    *Recurrent UTI, bladder wall thickening on CT:  Patient had an enterococcal UTI on 3/2/18 sensitive to nitrofurantoin and received treatment, unclear how long.  Recurrent UTI 3/20/18 with urine culture growing Klebsiella, initially treated with nitrofurantoin, transitioned to Levaquin.  CT 4/4/18 with diffuse bladder wall thickening with increased nodular thickening at the left base.  Referral to urogynecology Dr. May Johnson.  She was placed on a prophylactic dose of trimethoprim 100 mg daily, bladder wall thickening felt to be related to recent recurrent urinary tract  infections.  Patient with urinary symptoms, treated with course of Macrobid at the end of December 2018, urine culture however was negative 12/31/18.  Patient was found to have Klebsiella UTI 7/29/2019 which was successfully treated with Macrobid with complete resolution of symptoms.  CT 8/10/2021 with diffuse bladder wall thickening new from 5/17/2021 (however seen on multiple prior scans).    UTI on 10/18/2021 with E. coli greater than 100,000 colonies that was pansensitive, treated with Macrobid x7 days  With ongoing/recurrent UTIs patient was seen by urogynecology and initiated suppressive therapy with trimethoprim 100 mg daily in December 2021.  She has not experienced any further urinary tract infections.  CT abdomen pelvis 3/28/2022 does show diffuse thickening of urinary bladder as has been seen on prior studies indicative of cystitis.  Patient notes that she did stop taking trimethoprim on a daily basis.    Patient with urine culture on 5/5/2023 that grew E. coli and she received antibiotic treatment from urogynecology.    Urine culture on 8/23/2023 with greater than 100,000 colonies of E. coli resistant to ampicillin and Bactrim.  Received 5-day course of Cipro with resolution of symptoms.  Patient did have UTI with E. coli on culture 10/2/2023, received a course of Augmentin.  3/31/2025: Patient admits she did not complete course of antibiotics for UTI last month.  UA today suspicious for UTI.  Patient reports previously being on Cipro and tolerating this well, so we will send prescription for Cipro while we await culture.  4/7/2025 symptoms currently resolved.    *Charcot-Saloni-Tooth with mobility difficulties:  The patient underwent an intensive course of [facility] at [facility].  She graduated from her outpatient course November 2018.  Overall the patient has improved dramatically in terms of mobility,   Patient developed significant callus formation lateral aspect of the left plantar surface.     Patient developed skin erosion and an ulceration on the lateral aspect of her left foot.    Patient required surgery on her left foot by Dr. Lopez on 12/10/2024  Patient is continuing to recover from her left foot surgery in December.  She is currently in a boot, remains in a wheelchair.  She is starting physical therapy.    *Depression:  The patient is continuing follow-up in the supportive oncology clinic and is currently continuing on Cymbalta 30 mg twice daily as well as gabapentin 900 mg nightly, Ativan 0.5 mg nightly as needed, Provigil 100 mg daily.  Patient does continue to attend support groups at [employer].  The patient does continue routine follow-up in supportive oncology clinic.    Patient does have multiple stressors with her 's malignancy and chemotherapy as well as her autistic son who is living with them at home.  Patient continues to deal with grief related to her 's death.  She has seen a grief counselor through hospice which helped significantly.  She continues routine follow-up with supportive oncology as well.  3/31/2025: Patient has been struggling emotionally over the last month.  She stopped taking all of her home medications about 1 month ago, including her Xeloda.  She is having significantly decreased appetite, and has lost some weight.  She is tearful today, she discusses a lot of social stressors going on related to family and her 's death.  Will refer to  for financial resources.  She follows regularly with Jade, will send message to see if Jade is able to get her in sooner.  Otherwise, recommended patient resume her home medications.  4/7/2025 patient more stable today mentally.  She does have follow-up with Jade tomorrow.    *Hand-foot syndrome secondary to Xeloda:  Patient continues with frequent application of emollient cream to the hands and feet  Symptoms increased significantly requiring a 1 week delay in cycle 18 Xeloda as noted  above.  Symptoms did improve and she continued on the same dose.    Patient was referred to dermatology and has been continuing on triamcinolone 0.1% cream used for 1 week on followed by 1 week off which led to some further improvement.   Progressive palmar erythema with development of sclerodactyly and effect on dexterity.  Addition of urea-based cream 3 times daily.  Patient with continued difficulty regarding erythema of her hands that extended onto the dorsal aspect and was causing contractures/sclerodactyly in her fingers affecting her dexterity.  In July 2021, held Xeloda for an additional week and then reduced dose from 1500 mg twice daily down to 1000 mg a.m. and 1500 mg p.m. and continued on a 7-day on followed by 7-day off schedule.  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  On 3/31/2022, Xeloda was held to allow for recovery.  Patient resumed Xeloda on 4/18/2022.  Patient developed worsening sclerodactyly affecting dexterity that required Xeloda to again be briefly held for 2 weeks from 10/3 - 10/17/2022.  Treatment resumed at prior dose after improvement in symptoms.  3/31/2025: Patient admits she has not been using emollient cream or topical triamcinolone since December.  She has not been taking Xeloda regularly over the last month.  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  The degree of erythema on the dorsal aspect of the hand has diminished.  She does have some degree of dry skin and skin cracking in her palms and was encouraged to resume using her emollient cream frequently, especially since we are hoping to resume Xeloda next week.    *Evidence of  steatosis on scans with mild intermittent elevated liver function studies:  Liver function studies increased 8/20/19 with ALT 98, AST 70, normal total bilirubin.  Negative viral hepatitis A, B, and C panel 8/23/18  Likely related to hepatic steatosis.   Subsequent improvement in LFTs  LFTs today are normal    *Chemotherapy induced leukopenia:  WBC today is normal at 6.29.    *GERD, question of celiac disease:  Patient with significant history of reflux  Patient currently receiving Protonix 40 mg daily daily and Carafate 1 g 4 times daily as needed  Patient required hospitalization 5/29 - 6/1/2022 with presumed viral gastroenteritis that was exacerbated by Xeloda.    EGD performed 5/31/2022 during hospitalization with biopsy that showed focal blunting of villi and atrophy with slight increase in intraepithelial lymphocytes.  Changes were minimal but recommended correlation with serology to exclude celiac disease.  Celiac serology 8/8/2022 negative  Patient previously developed worsening reflux symptoms, temporarily increase Protonix to 40 mg twice daily and we did add Carafate 1 g 4 times daily.    She is taking Protonix 40 mg once daily, is not taking Carafate.    *Insomnia:  Patient with prior paradoxical reaction to Benadryl  Improved previously on temazepam 15 mg nightly as needed.   Patient noted subsequently that temazepam was having no effect.   Patient continuing on gabapentin 900 mg nightly    *Health maintenance:  Patient notes that she has a history of colon polyps as well as ulcerative colitis and was due for a follow-up colonoscopy on 9/12/2019.  We did discuss there is no necessity to pursue colonoscopy in the setting of her metastatic breast cancer.    The patient did undergo colonoscopy on 2/7/2020 with findings of muscular hypertrophy and diverticulosis.   Patient did wish to proceed with further colonoscopic evaluation, performed on 12/20/2022 with poor prep.  Repeat 2/28/2023 with  diverticulosis.    *Bilateral shoulder pain:  Chronic difficulty with right shoulder although she has not complained of this in prior visits to our office.  She reported difficulty with abduction.    The patient did undergo MRI of her right shoulder on 11/21/2019 at an outside facility showing multiple abnormalities including supraspinatus tendinosis, labral tear but no evidence of metastatic disease.  Patient developed pain in the left shoulder, was seen by orthopedics and underwent steroid injection with some improvement.  Right shoulder stable.  Patient did undergo physical therapy with some improvement.  She continues to use tramadol 50 mg every 8-12 hours as needed.  Note that current CT 10/20/2022 made notation of left shoulder probable bursitis.    Patient continues with bilateral shoulder pain which does wax and wane.  Recently right shoulder pain has been more problematic, exacerbated by using her arms to assist with transfers and ability after foot surgery.  See further discussion above.    *Ocular changes in part related to Xeloda:  Patient experienced a mild degree of blurred vision as well as burning and pruritus.  She was seen by her ophthalmologist and was placed on xiidra ophthalmic drops which have helped.  Likely both issues are to some extent related to Xeloda.  Symptoms did worsen and patient was seen by ophthalmologist at [facility].  She is now using an ocular lubricant at night in addition to artificial tears which have helped.  Symptoms currently stable to improved    *Elevated glucose:  It is noted the patient's glucose at the last few visits has been in the high 100 range, postprandial.  She has had a hemoglobin A1c that has been in the high 5-6 range in the past  Hemoglobin A1c was last checked on 12/3/2024 at 5.8    *Possible loosening of pedicle screw at T3:  CT cervical spine 5/17/2021 showed loosening of the left pedicle screw at T3.  Patient referred to orthopedics and  was seen by Dr. Nayak who felt that there were no concerning findings on the scan    *Iron deficiency anemia  Labs on 5/2/2022 with hemoglobin 10.8.  Additional labs with iron 48, ferritin 21.2, iron saturation 11%, TIBC 4 and 32, folate greater than 20, B12 greater than 2000.    Attempted treatment with oral iron daily however patient was intolerant  Patient subsequently received Venofer 300 mg weekly x4 beginning 6/6/2022 and completed on 6/27/2022  Subsequent iron studies on 7/11/2022 showed improvement with iron 62, ferritin 345, iron saturation 18%, TIBC 336.  Hemoglobin had improved up to 12.7 at that time.  Repeat iron studies on 10/3/2022 showed iron replete status with hemoglobin 13.7, iron 121, ferritin 208.7, iron saturation 31%, TIBC 392.  4/7/2025 patient is iron deficient again with hemoglobin down to 9.7, iron sat 5%, ferritin 60.  We will go ahead and look into getting her approved for IV iron to begin next week when she returns for her Xgeva.     Plan:   Resume Xeloda today at previous dosing of 1000 mg a.m., 1500 mg in the p.m. 7 days on followed by 7 days off (patient has been on single agent Xeloda since 2/7/2018)  Plan to give IV iron x 2 with Injectafer beginning next week.    Patient will follow-up with LISA Lofton with supportive oncology tomorrow.  Continue Eliquis 5 mg twice daily  Continue frequent use of emollient cream currently 5 times daily and continue periodic triamcinolone cream 0.1% twice daily (provided by dermatology) 2 weeks on followed by 2 weeks off as prescribed  Continue Protonix 40 mg daily  Continue ocular lubricating gel nightly per ophthalmology  Continue Provigil 100 mg daily and Cymbalta 30 mg twice daily.    Continue gabapentin 900 mg at night.   Refill tramadol 50 mg every 8 hours as needed for pain per Dr. Hancock today.  However we will refer the patient to pain management per her request due to ongoing/chronic shoulder pain that is only getting  worse.  Patient otherwise return in 1 week for NP follow-up, every 3-month Xgeva, and Injectafer dose #1.  Plan to give patient stool cards at that time to check for occult blood per discussion with Dr. Hancock today.  Return in 2 weeks for Injectafer dose #2.  MD visit in mid May 2025 with CBC, CMP.  We will discuss timeframe for follow-up scans.     Patient continuing on high risk medication requiring intensive monitoring.

## 2025-04-09 ENCOUNTER — OFFICE VISIT (OUTPATIENT)
Dept: PSYCHIATRY | Facility: HOSPITAL | Age: 65
End: 2025-04-09
Payer: MEDICARE

## 2025-04-09 DIAGNOSIS — F43.10 POST TRAUMATIC STRESS DISORDER (PTSD): ICD-10-CM

## 2025-04-09 DIAGNOSIS — R53.0 NEOPLASTIC MALIGNANT RELATED FATIGUE: Primary | ICD-10-CM

## 2025-04-09 DIAGNOSIS — F33.1 MAJOR DEPRESSIVE DISORDER, RECURRENT EPISODE, MODERATE: ICD-10-CM

## 2025-04-09 DIAGNOSIS — C50.919 PRIMARY MALIGNANT NEOPLASM OF BREAST WITH METASTASIS: ICD-10-CM

## 2025-04-09 DIAGNOSIS — F43.21 GRIEF: ICD-10-CM

## 2025-04-09 RX ORDER — CLONAZEPAM 0.5 MG/1
0.5 TABLET ORAL NIGHTLY PRN
Qty: 15 TABLET | Refills: 0 | OUTPATIENT
Start: 2025-04-09 | End: 2025-04-09

## 2025-04-09 RX ORDER — CLONAZEPAM 0.5 MG/1
0.5 TABLET ORAL NIGHTLY PRN
Qty: 15 TABLET | Refills: 0 | Status: SHIPPED | OUTPATIENT
Start: 2025-04-09

## 2025-04-09 NOTE — PROGRESS NOTES
[facility]  In Person Session    Subjective  Patient ID: Martha Davey is a 64 y.o. female is seen face to face in the [facility].    CC: Fatigue, depression, grief    HPI/ Interval History:   Pt is seen in follow up regarding persistent symptoms of depression exacerbated by grief with loss of , ongoing treatment for metastatic breast cancer.    States over the last six weeks, she has discontinued all medications, specifically relating this to persistent GI upset following initiation of abx for UTI.  States appetite was low, wasn't eating, and didn't want to take meds on empty stomach. Admits to significant decompensation, and has been back on these for 2 weeks after being off for appx 4 weeks. Was very irritable during this time.  Pain contributes. Agrees this was likely more intense when off medications, while still intrusive. Is sleeping well, appx midnight to 9 AM.  Continues to take clonazepam 0.25 mg q hs. Anxiety remains elevated, specifically associating this with current events, political milieu. Fortunately, 's estate is essentially settled, appreciating improved financial situation while concerned regarding uncertainty of SS at this time.    Current routine reviewed; reports attempt to clean, although mobility in shoulders in limited. Is engaged in PT, but only for foot. Unsure how helpful this would be to shoulder. Has apt upcoming with pain mgmt to explore options. Currently rates this 7.5/10. Agrees this is distracting in conversation at times. Is attempting to socialize more, appreciating support of friend who takes her to apts and goes to lunch with her. Remains in close contact with friend in [place]. Wants to drive and regain independence. Sees this as motivator to remain on medications, improve mobility, engage in PT, etc.     Shares upcoming anniversary of 's death later this month. Plans to go to the [facility] site on this  day. Moreno fear of own mortality with remembrance of his decline and passing.  He is future oriented, hoping for ability to attend trip to [place] in August.    Exam: As above    Recent Labs Reviewed:  Lab on 04/07/2025   Component Date Value    Ferritin 04/07/2025 60.70     Iron 04/07/2025 17 (L)     Iron Saturation (TSAT) 04/07/2025 5 (L)     Transferrin 04/07/2025 237     TIBC 04/07/2025 353     Glucose 04/07/2025 163 (H)     BUN 04/07/2025 19     Creatinine 04/07/2025 0.77     Sodium 04/07/2025 140     Potassium 04/07/2025 4.2     Chloride 04/07/2025 100     CO2 04/07/2025 30.7 (H)     Calcium 04/07/2025 9.9     Total Protein 04/07/2025 7.2     Albumin 04/07/2025 3.7     ALT (SGPT) 04/07/2025 6     AST (SGOT) 04/07/2025 12     Alkaline Phosphatase 04/07/2025 71     Total Bilirubin 04/07/2025 0.2     Globulin 04/07/2025 3.5     A/G Ratio 04/07/2025 1.1     BUN/Creatinine Ratio 04/07/2025 24.7     Anion Gap 04/07/2025 9.3     eGFR 04/07/2025 86.3     WBC 04/07/2025 6.67     RBC 04/07/2025 3.60 (L)     Hemoglobin 04/07/2025 9.9 (L)     Hematocrit 04/07/2025 31.8 (L)     MCV 04/07/2025 88.3     MCH 04/07/2025 27.5     MCHC 04/07/2025 31.1 (L)     RDW 04/07/2025 19.2 (H)     RDW-SD 04/07/2025 62.1 (H)     MPV 04/07/2025 10.0     Platelets 04/07/2025 366     Neutrophil % 04/07/2025 68.5     Lymphocyte % 04/07/2025 19.5 (L)     Monocyte % 04/07/2025 9.1     Eosinophil % 04/07/2025 2.2     Basophil % 04/07/2025 0.6     Immature Grans % 04/07/2025 0.1     Neutrophils, Absolute 04/07/2025 4.56     Lymphocytes, Absolute 04/07/2025 1.30     Monocytes, Absolute 04/07/2025 0.61     Eosinophils, Absolute 04/07/2025 0.15     Basophils, Absolute 04/07/2025 0.04     Immature Grans, Absolute 04/07/2025 0.01     nRBC 04/07/2025 0.0    Lab on 03/31/2025   Component Date Value    Glucose 03/31/2025 118 (H)     BUN 03/31/2025 15     Creatinine 03/31/2025 0.83     Sodium 03/31/2025 143     Potassium 03/31/2025 4.3     Chloride  03/31/2025 101     CO2 03/31/2025 25.0     Calcium 03/31/2025 8.8     Total Protein 03/31/2025 7.0     Albumin 03/31/2025 3.7     ALT (SGPT) 03/31/2025 5     AST (SGOT) 03/31/2025 18     Alkaline Phosphatase 03/31/2025 70     Total Bilirubin 03/31/2025 0.3     Globulin 03/31/2025 3.3     A/G Ratio 03/31/2025 1.1     BUN/Creatinine Ratio 03/31/2025 18.1     Anion Gap 03/31/2025 17.0 (H)     eGFR 03/31/2025 78.8     Magnesium 03/31/2025 2.0     Urine Culture 03/31/2025 >100,000 CFU/mL Escherichia coli (A)     WBC 03/31/2025 6.29     RBC 03/31/2025 4.24     Hemoglobin 03/31/2025 11.7 (L)     Hematocrit 03/31/2025 38.3     MCV 03/31/2025 90.3     MCH 03/31/2025 27.6     MCHC 03/31/2025 30.5 (L)     RDW 03/31/2025 19.3 (H)     RDW-SD 03/31/2025 63.3 (H)     MPV 03/31/2025 9.9     Platelets 03/31/2025 450     Neutrophil % 03/31/2025 61.3     Lymphocyte % 03/31/2025 27.2     Monocyte % 03/31/2025 7.8     Eosinophil % 03/31/2025 2.5     Basophil % 03/31/2025 1.0     Immature Grans % 03/31/2025 0.2     Neutrophils, Absolute 03/31/2025 3.86     Lymphocytes, Absolute 03/31/2025 1.71     Monocytes, Absolute 03/31/2025 0.49     Eosinophils, Absolute 03/31/2025 0.16     Basophils, Absolute 03/31/2025 0.06     Immature Grans, Absolute 03/31/2025 0.01     nRBC 03/31/2025 0.0     Color, UA 03/31/2025 Sophia (A)     Appearance, UA 03/31/2025 Cloudy (A)     pH, UA 03/31/2025 5.5     Specific Gravity, UA 03/31/2025 >=1.030     Glucose, UA 03/31/2025 Negative     Ketones, UA 03/31/2025 Trace (A)     Bilirubin, UA 03/31/2025 Small (1+) (A)     Blood, UA 03/31/2025 Large (3+) (A)     Protein, UA 03/31/2025 100 mg/dL (2+) (A)     Leuk Esterase, UA 03/31/2025 Moderate (2+) (A)     Nitrite, UA 03/31/2025 Negative     Urobilinogen, UA 03/31/2025 1.0 E.U./dL    Office Visit on 02/24/2025   Component Date Value    Urine Culture 02/24/2025 >100,000 CFU/mL Escherichia coli (A)     Color, UA 02/24/2025 Yellow     Appearance, UA 02/24/2025 Cloudy  (A)     pH, UA 02/24/2025 5.5     Specific Gravity, UA 02/24/2025 1.025     Glucose, UA 02/24/2025 Negative     Ketones, UA 02/24/2025 Trace (A)     Bilirubin, UA 02/24/2025 Small (1+) (A)     Blood, UA 02/24/2025 Moderate (2+) (A)     Protein, UA 02/24/2025 100 mg/dL (2+) (A)     Leuk Esterase, UA 02/24/2025 Moderate (2+) (A)     Nitrite, UA 02/24/2025 Positive (A)     Urobilinogen, UA 02/24/2025 0.2 E.U./dL     RBC, UA 02/24/2025 11-20 (A)     WBC, UA 02/24/2025 Too Numerous to Count (A)     Bacteria, UA 02/24/2025 4+ (A)     Squamous Epithelial Cell* 02/24/2025 7-12 (A)     Yeast, UA 02/24/2025 Present (A)     Hyaline Casts, UA 02/24/2025 3-6     Methodology 02/24/2025 Manual Light Microscopy    Lab on 02/24/2025   Component Date Value    Glucose 02/24/2025 121 (H)     BUN 02/24/2025 19     Creatinine 02/24/2025 0.81     Sodium 02/24/2025 137     Potassium 02/24/2025 4.4     Chloride 02/24/2025 98     CO2 02/24/2025 30.6 (H)     Calcium 02/24/2025 9.3     Total Protein 02/24/2025 7.1     Albumin 02/24/2025 3.7     ALT (SGPT) 02/24/2025 9     AST (SGOT) 02/24/2025 17     Alkaline Phosphatase 02/24/2025 77     Total Bilirubin 02/24/2025 0.3     Globulin 02/24/2025 3.4     A/G Ratio 02/24/2025 1.1     BUN/Creatinine Ratio 02/24/2025 23.5     Anion Gap 02/24/2025 8.4     eGFR 02/24/2025 81.2     WBC 02/24/2025 8.70     RBC 02/24/2025 4.05     Hemoglobin 02/24/2025 11.7 (L)     Hematocrit 02/24/2025 38.1     MCV 02/24/2025 94.1     MCH 02/24/2025 28.9     MCHC 02/24/2025 30.7 (L)     RDW 02/24/2025 19.4 (H)     RDW-SD 02/24/2025 64.1 (H)     MPV 02/24/2025 10.9     Platelets 02/24/2025 226     Neutrophil % 02/24/2025 72.6     Lymphocyte % 02/24/2025 13.7 (L)     Monocyte % 02/24/2025 11.8     Eosinophil % 02/24/2025 1.0     Basophil % 02/24/2025 0.7     Immature Grans % 02/24/2025 0.2     Neutrophils, Absolute 02/24/2025 6.31     Lymphocytes, Absolute 02/24/2025 1.19     Monocytes, Absolute 02/24/2025 1.03 (H)      Eosinophils, Absolute 02/24/2025 0.09     Basophils, Absolute 02/24/2025 0.06     Immature Grans, Absolute 02/24/2025 0.02     nRBC 02/24/2025 0.0    Labs reviewed    Current Psychotropic Medications:  Duloxetine 90 mg daily  Modafinil     Plan of Care/ Medical Decision Making  Encouraged continued adherence to medications and CPAP for chronic symptoms of affective disturbance, chronic fatigue, and diagnosed MARIA M.  Discussed goals regarding physical rehabilitation wellness; considered transition to aqua therapy following PT.  Ongoing counseling provided surrounding grief, loss, previous trauma, and personal goals of full force living.  FU scheduled in 4 weeks.    Diagnoses and all orders for this visit:    1. Neoplastic malignant related fatigue (Primary)    2. Metastatic breast cancer    3. Grief    4. Major depressive disorder, recurrent episode, moderate    5. Post traumatic stress disorder (PTSD)    Other orders  -     Discontinue: clonazePAM (KlonoPIN) 0.5 MG tablet; Take 1 tablet by mouth At Night As Needed for Anxiety.  Dispense: 15 tablet; Refill: 0  -     clonazePAM (KlonoPIN) 0.5 MG tablet; Take 1 tablet by mouth At Night As Needed for Anxiety.  Dispense: 15 tablet; Refill: 0    I spent 36 minutes caring for Martha on this date of service. This time includes time spent by me in the following activities: preparing for the visit, reviewing tests, obtaining and/or reviewing a separately obtained history, performing a medically appropriate examination and/or evaluation, counseling and educating the patient/family/caregiver, ordering medications, tests, or procedures, and documenting information in the medical record.

## 2025-04-14 ENCOUNTER — LAB (OUTPATIENT)
Dept: OTHER | Facility: HOSPITAL | Age: 65
End: 2025-04-14
Payer: MEDICARE

## 2025-04-14 ENCOUNTER — INFUSION (OUTPATIENT)
Dept: ONCOLOGY | Facility: HOSPITAL | Age: 65
End: 2025-04-14
Payer: MEDICARE

## 2025-04-14 ENCOUNTER — OFFICE VISIT (OUTPATIENT)
Dept: ONCOLOGY | Facility: CLINIC | Age: 65
End: 2025-04-14
Payer: MEDICARE

## 2025-04-14 VITALS — HEART RATE: 84 BPM | DIASTOLIC BLOOD PRESSURE: 71 MMHG | RESPIRATION RATE: 16 BRPM | SYSTOLIC BLOOD PRESSURE: 126 MMHG

## 2025-04-14 VITALS
SYSTOLIC BLOOD PRESSURE: 149 MMHG | RESPIRATION RATE: 16 BRPM | BODY MASS INDEX: 35.12 KG/M2 | DIASTOLIC BLOOD PRESSURE: 77 MMHG | HEIGHT: 60 IN | TEMPERATURE: 98.5 F | HEART RATE: 98 BPM | WEIGHT: 178.9 LBS | OXYGEN SATURATION: 97 %

## 2025-04-14 DIAGNOSIS — C50.811 MALIGNANT NEOPLASM OF OVERLAPPING SITES OF RIGHT BREAST IN FEMALE, ESTROGEN RECEPTOR NEGATIVE: ICD-10-CM

## 2025-04-14 DIAGNOSIS — D50.9 IRON DEFICIENCY ANEMIA, UNSPECIFIED IRON DEFICIENCY ANEMIA TYPE: ICD-10-CM

## 2025-04-14 DIAGNOSIS — C50.811 MALIGNANT NEOPLASM OF OVERLAPPING SITES OF RIGHT BREAST IN FEMALE, ESTROGEN RECEPTOR NEGATIVE: Primary | ICD-10-CM

## 2025-04-14 DIAGNOSIS — Z17.1 MALIGNANT NEOPLASM OF OVERLAPPING SITES OF RIGHT BREAST IN FEMALE, ESTROGEN RECEPTOR NEGATIVE: ICD-10-CM

## 2025-04-14 DIAGNOSIS — C79.51 MALIGNANT NEOPLASM METASTATIC TO BONE: ICD-10-CM

## 2025-04-14 DIAGNOSIS — Z79.899 HIGH RISK MEDICATION USE: ICD-10-CM

## 2025-04-14 DIAGNOSIS — Z17.1 MALIGNANT NEOPLASM OF OVERLAPPING SITES OF RIGHT BREAST IN FEMALE, ESTROGEN RECEPTOR NEGATIVE: Primary | ICD-10-CM

## 2025-04-14 DIAGNOSIS — M25.519 SHOULDER PAIN, UNSPECIFIED CHRONICITY, UNSPECIFIED LATERALITY: ICD-10-CM

## 2025-04-14 DIAGNOSIS — G60.0 CHARCOT-MARIE-TOOTH DISEASE: ICD-10-CM

## 2025-04-14 DIAGNOSIS — C79.49 METASTASIS TO SPINAL CORD: ICD-10-CM

## 2025-04-14 DIAGNOSIS — T45.4X5A ADVERSE EFFECT OF IRON, INITIAL ENCOUNTER: Primary | ICD-10-CM

## 2025-04-14 LAB
ALBUMIN SERPL-MCNC: 3.9 G/DL (ref 3.5–5.2)
ALBUMIN/GLOB SERPL: 1.3 G/DL
ALP SERPL-CCNC: 69 U/L (ref 39–117)
ALT SERPL W P-5'-P-CCNC: 6 U/L (ref 1–33)
ANION GAP SERPL CALCULATED.3IONS-SCNC: 8.3 MMOL/L (ref 5–15)
AST SERPL-CCNC: 17 U/L (ref 1–32)
BASOPHILS # BLD AUTO: 0.07 10*3/MM3 (ref 0–0.2)
BASOPHILS NFR BLD AUTO: 1.3 % (ref 0–1.5)
BILIRUB SERPL-MCNC: 0.2 MG/DL (ref 0–1.2)
BUN SERPL-MCNC: 27 MG/DL (ref 8–23)
BUN/CREAT SERPL: 32.9 (ref 7–25)
CALCIUM SPEC-SCNC: 9.5 MG/DL (ref 8.6–10.5)
CHLORIDE SERPL-SCNC: 100 MMOL/L (ref 98–107)
CO2 SERPL-SCNC: 29.7 MMOL/L (ref 22–29)
CREAT SERPL-MCNC: 0.82 MG/DL (ref 0.57–1)
DEPRECATED RDW RBC AUTO: 62.5 FL (ref 37–54)
EGFRCR SERPLBLD CKD-EPI 2021: 80 ML/MIN/1.73
EOSINOPHIL # BLD AUTO: 0.14 10*3/MM3 (ref 0–0.4)
EOSINOPHIL NFR BLD AUTO: 2.7 % (ref 0.3–6.2)
ERYTHROCYTE [DISTWIDTH] IN BLOOD BY AUTOMATED COUNT: 19.7 % (ref 12.3–15.4)
GLOBULIN UR ELPH-MCNC: 3.1 GM/DL
GLUCOSE SERPL-MCNC: 125 MG/DL (ref 65–99)
HCT VFR BLD AUTO: 34.2 % (ref 34–46.6)
HGB BLD-MCNC: 10.5 G/DL (ref 12–15.9)
IMM GRANULOCYTES # BLD AUTO: 0.02 10*3/MM3 (ref 0–0.05)
IMM GRANULOCYTES NFR BLD AUTO: 0.4 % (ref 0–0.5)
LYMPHOCYTES # BLD AUTO: 1.29 10*3/MM3 (ref 0.7–3.1)
LYMPHOCYTES NFR BLD AUTO: 24.7 % (ref 19.6–45.3)
MAGNESIUM SERPL-MCNC: 2 MG/DL (ref 1.6–2.4)
MCH RBC QN AUTO: 27.2 PG (ref 26.6–33)
MCHC RBC AUTO-ENTMCNC: 30.7 G/DL (ref 31.5–35.7)
MCV RBC AUTO: 88.6 FL (ref 79–97)
MONOCYTES # BLD AUTO: 0.33 10*3/MM3 (ref 0.1–0.9)
MONOCYTES NFR BLD AUTO: 6.3 % (ref 5–12)
NEUTROPHILS NFR BLD AUTO: 3.37 10*3/MM3 (ref 1.7–7)
NEUTROPHILS NFR BLD AUTO: 64.6 % (ref 42.7–76)
NRBC BLD AUTO-RTO: 0 /100 WBC (ref 0–0.2)
PHOSPHATE SERPL-MCNC: 3.9 MG/DL (ref 2.5–4.5)
PLATELET # BLD AUTO: 488 10*3/MM3 (ref 140–450)
PMV BLD AUTO: 10.2 FL (ref 6–12)
POTASSIUM SERPL-SCNC: 4.7 MMOL/L (ref 3.5–5.2)
PROT SERPL-MCNC: 7 G/DL (ref 6–8.5)
RBC # BLD AUTO: 3.86 10*6/MM3 (ref 3.77–5.28)
SODIUM SERPL-SCNC: 138 MMOL/L (ref 136–145)
WBC NRBC COR # BLD AUTO: 5.22 10*3/MM3 (ref 3.4–10.8)

## 2025-04-14 PROCEDURE — 80053 COMPREHEN METABOLIC PANEL: CPT | Performed by: INTERNAL MEDICINE

## 2025-04-14 PROCEDURE — 83735 ASSAY OF MAGNESIUM: CPT | Performed by: INTERNAL MEDICINE

## 2025-04-14 PROCEDURE — 96372 THER/PROPH/DIAG INJ SC/IM: CPT

## 2025-04-14 PROCEDURE — A9270 NON-COVERED ITEM OR SERVICE: HCPCS | Performed by: NURSE PRACTITIONER

## 2025-04-14 PROCEDURE — 96374 THER/PROPH/DIAG INJ IV PUSH: CPT

## 2025-04-14 PROCEDURE — 85025 COMPLETE CBC W/AUTO DIFF WBC: CPT | Performed by: INTERNAL MEDICINE

## 2025-04-14 PROCEDURE — 84100 ASSAY OF PHOSPHORUS: CPT | Performed by: INTERNAL MEDICINE

## 2025-04-14 PROCEDURE — 25010000002 DENOSUMAB 120 MG/1.7ML SOLUTION: Performed by: NURSE PRACTITIONER

## 2025-04-14 PROCEDURE — 36415 COLL VENOUS BLD VENIPUNCTURE: CPT

## 2025-04-14 PROCEDURE — 25010000002 FERRIC CARBOXYMALTOSE 750 MG/15ML SOLUTION 15 ML VIAL: Performed by: NURSE PRACTITIONER

## 2025-04-14 PROCEDURE — 63710000001 PROCHLORPERAZINE MALEATE PER 5 MG: Performed by: NURSE PRACTITIONER

## 2025-04-14 RX ORDER — PROCHLORPERAZINE MALEATE 10 MG
10 TABLET ORAL ONCE
Status: CANCELLED | OUTPATIENT
Start: 2025-04-21 | End: 2025-04-21

## 2025-04-14 RX ORDER — FAMOTIDINE 10 MG/ML
20 INJECTION, SOLUTION INTRAVENOUS AS NEEDED
Status: CANCELLED | OUTPATIENT
Start: 2025-04-14

## 2025-04-14 RX ORDER — SODIUM CHLORIDE 9 MG/ML
20 INJECTION, SOLUTION INTRAVENOUS ONCE
Status: CANCELLED | OUTPATIENT
Start: 2025-04-21

## 2025-04-14 RX ORDER — SODIUM CHLORIDE 9 MG/ML
20 INJECTION, SOLUTION INTRAVENOUS ONCE
Status: CANCELLED | OUTPATIENT
Start: 2025-04-14

## 2025-04-14 RX ORDER — FAMOTIDINE 10 MG/ML
20 INJECTION, SOLUTION INTRAVENOUS AS NEEDED
Status: CANCELLED | OUTPATIENT
Start: 2025-04-21

## 2025-04-14 RX ORDER — HYDROCORTISONE SODIUM SUCCINATE 100 MG/2ML
100 INJECTION INTRAMUSCULAR; INTRAVENOUS AS NEEDED
Status: CANCELLED | OUTPATIENT
Start: 2025-04-14

## 2025-04-14 RX ORDER — PROCHLORPERAZINE MALEATE 5 MG/1
10 TABLET ORAL ONCE
Status: COMPLETED | OUTPATIENT
Start: 2025-04-14 | End: 2025-04-14

## 2025-04-14 RX ORDER — DIPHENHYDRAMINE HYDROCHLORIDE 50 MG/ML
50 INJECTION, SOLUTION INTRAMUSCULAR; INTRAVENOUS AS NEEDED
Status: CANCELLED | OUTPATIENT
Start: 2025-04-14

## 2025-04-14 RX ORDER — SODIUM CHLORIDE 9 MG/ML
20 INJECTION, SOLUTION INTRAVENOUS ONCE
Status: DISCONTINUED | OUTPATIENT
Start: 2025-04-14 | End: 2025-04-14 | Stop reason: HOSPADM

## 2025-04-14 RX ORDER — DIPHENHYDRAMINE HYDROCHLORIDE 50 MG/ML
50 INJECTION, SOLUTION INTRAMUSCULAR; INTRAVENOUS AS NEEDED
Status: CANCELLED | OUTPATIENT
Start: 2025-04-21

## 2025-04-14 RX ORDER — HYDROCORTISONE SODIUM SUCCINATE 100 MG/2ML
100 INJECTION INTRAMUSCULAR; INTRAVENOUS AS NEEDED
Status: CANCELLED | OUTPATIENT
Start: 2025-04-21

## 2025-04-14 RX ORDER — PROCHLORPERAZINE MALEATE 10 MG
10 TABLET ORAL ONCE
Status: CANCELLED | OUTPATIENT
Start: 2025-04-14 | End: 2025-04-14

## 2025-04-14 RX ADMIN — PROCHLORPERAZINE MALEATE 10 MG: 5 TABLET ORAL at 13:15

## 2025-04-14 RX ADMIN — DENOSUMAB 120 MG: 120 INJECTION SUBCUTANEOUS at 13:59

## 2025-04-14 RX ADMIN — FERRIC CARBOXYMALTOSE INJECTION 750 MG: 50 INJECTION, SOLUTION INTRAVENOUS at 13:24

## 2025-04-14 NOTE — PROGRESS NOTES
Previous Stage Ib ([id number]) ER/UT positive, HER-2/peter negative right breast cancer with subsequent metastatic disease identified 10/8/2017, history of right pulmonary embolism, cancer related pain, chemotherapy-induced diarrhea, chemotherapy-induced mucositis, chemotherapy-induced hand-foot syndrome     Subjective  History of Present Illness  Martha is seen back today in close follow-up.  She was seen last week as a triage visit.  Patient was previously taking Xeloda 1000 mg in the morning, 1500 mg in the evening 7 days on followed by 7 days off as well as every 3-month Xgeva (last received 1/14/2025), and Eliquis 5 mg twice daily.  She has been maintained on treatment with single agent Xeloda since 2/7/2018.  It was discovered at that visit last week that she was not taking most of her medications.  Patient has been overwhelmed since the passing of her  and with some personal financial and family stressors as well.  She was also struggling with symptoms of UTI though admittedly had not completed a prior course of Bactrim that had been prescribed.  We had her continue to hold Xeloda but did give her Cipro for urinary tract symptoms.  She was encouraged to start back on all her other previously held medications including Cymbalta.     I saw the patient back in follow-up last week with resolution of UTI symptoms noted.  Patient did go ahead and resume Xeloda at that time.  We did also refer her per request to pain management for ongoing significant shoulder pain.     She did follow-up with LISA Green, supportive oncology, on 4/9/2025.  She is continuing Klonopin as needed for anxiety.     Last week she was noted to have further decline in her hemoglobin down to 9.9.  This prompted additional lab work showing that she is once again iron deficient.  We are planning to proceed with additional IV iron today along with scheduled Xgeva.  We do plan to give her stool cards to check for occult blood as  "well.  She is noting today more fatigue and wanting to sleep more which we discussed is likely related to her worsening iron deficiency.  Hgb today is better at 10.5 thankfully.              Objective  Vital Signs:   /77   Pulse 98   Temp 98.5 °F (36.9 °C) (Oral)   Resp 16   Ht 152.4 cm (60\")   Wt 81.1 kg (178 lb 14.4 oz)   SpO2 97%   BMI 34.94 kg/m²        Physical Exam  Constitutional:       Appearance: She is well-developed.      Comments: Ambulating with rolling walker today   Eyes:      Conjunctiva/sclera: Conjunctivae normal.   Neck:      Thyroid: No thyromegaly.   Cardiovascular:      Rate and Rhythm: Normal rate and regular rhythm.      Heart sounds: No murmur heard.     No friction rub. No gallop.   Pulmonary:      Effort: No respiratory distress.      Breath sounds: Normal breath sounds.   Abdominal:      General: Bowel sounds are normal. There is no distension.      Palpations: Abdomen is soft.      Tenderness: There is no abdominal tenderness.   Musculoskeletal:      Comments: Boot in place left lower extremity   Lymphadenopathy:      Head:      Right side of head: No submandibular adenopathy.      Cervical: No cervical adenopathy.      Upper Body:      Right upper body: No supraclavicular adenopathy.      Left upper body: No supraclavicular adenopathy.   Skin:     General: Skin is warm and dry.      Findings: Rash present.      Comments: Overall the patient's hands have improved compared to prior exam.  The degree of erythema in the palms and dorsal aspect of her hands has diminished.  The degree of sclerodactyly has diminished, mobility in her fingers has improved.  She does have significant dry skin in her palms with some skin cracking, no significant peeling.   Neurological:      Mental Status: She is alert and oriented to person, place, and time.      Cranial Nerves: No cranial nerve deficit.      Motor: No abnormal muscle tone.      Deep Tendon Reflexes: Reflexes normal.   Psychiatric: "         Behavior: Behavior normal.               Result Review:  .  Results from last 7 days   Lab Units 04/14/25  1216 04/07/25  1557   WBC 10*3/mm3 5.22 6.67   NEUTROS ABS 10*3/mm3 3.37 4.56   HEMOGLOBIN g/dL 10.5* 9.9*   HEMATOCRIT % 34.2 31.8*   PLATELETS 10*3/mm3 488* 366     Results from last 7 days   Lab Units 04/14/25  1216 04/07/25  1557   SODIUM mmol/L 138 140   POTASSIUM mmol/L 4.7 4.2   CHLORIDE mmol/L 100 100   CO2 mmol/L 29.7* 30.7*   BUN mg/dL 27* 19   CREATININE mg/dL 0.82 0.77   CALCIUM mg/dL 9.5 9.9   ALBUMIN g/dL 3.9 3.7   BILIRUBIN mg/dL 0.2 0.2   ALK PHOS U/L 69 71   ALT (SGPT) U/L 6 6   AST (SGOT) U/L 17 12   GLUCOSE mg/dL 125* 163*   MAGNESIUM mg/dL 2.0  --    FERRITIN ng/mL  --  60.70   IRON mcg/dL  --  17*   TIBC mcg/dL  --  353                Assessment  Assessment and Plan     *Previous Stage Ib ([id number]) right breast cancer:  Diagnosed May 2010 with excisional biopsy for invasive ductal carcinoma, 1.3 cm, grade 2, ER 90%, MA 80%, HER-2/peter negative (1+ IHC).    Subsequent right mastectomy in July 2010 with no residual breast malignancy, 1/5 sentinel lymph node with micrometastasis (0.25 mm).    Treated in the [facility] system with adjuvant AC ×4 cycles in 2010 (no taxanes administered due to underlying Charcot-Saloni-Tooth with peripheral neuropathy).    Adjuvant Femara (postmenopausal) initiated October 2010 with plan to treat ×10 years.    Genetic testing reportedly negative.    Developed osteopenia treated with Prolia beginning 2/27/13. Subsequently discontinued due to identification of metastatic disease.     *Recurrent/metastatic disease identified 10/8/17:  Disease involving thoracic spine with cord compression at T6, lumbosacral involvement, sternal and right sternoclavicular involvement.    Femara discontinued in 10/2017.    Radiation administered (in the [facility] system) to the thoracic spine beginning 10/19/17 treating T3-T9 to a dose of 24 gray in 6 fractions.  Evaluation with  MRI 12/8/17 showing persistent T6 cord compression with persistent neurologic compromise requiring surgical treatment 12/11/17 with T6 laminectomy/corpectomy and T3-T9 fusion.  Pathology with metastatic carcinoma of breast origin, ER negative, RI negative, HER-2/peter negative (1+ IHC).  Additional staging evaluation 12/8/17 with no evidence of visceral metastatic disease, bone scan showing involvement of thoracic spine, sternum, left humerus, mid frontal bone.  No plane film correlate of left humerus lesion.  MRI lumbar spine with small intradural L3 metastasis.  CA 15-3 12/6/17- 17.  Palliative radiation therapy to L3 dural metastasis and left humerus initiated 1/15/18 (10 fractions), completed 1/26/18.  Hypercalcemia of malignancy with calcium in the 10-11 range.  Initiation of monthly Xgeva 1/23/18.  Baseline CT scan 1/30/18 with no evidence of visceral involvement.  Cluster of nodular opacities in the right lower lobe suspected to be infectious or related to bronchiolitis. Bone scan 1/30/18 showed postsurgical change in the thoracic spine, stable uptake in the frontal bone, no new areas of disease.  Initiation of palliative oral single agent Xeloda 2/7/18 2000 mg a.m., 1500 mg p.m. for 14/21 days.   Following 3 cycles xeloda, bone scan 4/4/18 showed no change from the prior study.  CT scan 4/4/18 showed a small pericardial effusion of unclear significance as well as a subcutaneous nodule in the right lateral chest wall.  Subsequent evaluation with echocardiogram 4/17/18 showed no evidence of pericardial effusion.  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  We discussed the possibility of surgical excision of the right subcutaneous chest wall lesion for more definitive diagnosis.  Reviewed previous CT images dating back to 12/8/17 and the lesion was present even at that  time measuring around 1.7 cm although not commented on in the radiology report.  As this appears to be an indolent low-grade process unrelated to her breast cancer, recommendee foregoing surgical excision at this time and monitoring this area on future scans.  The patient agreed.    Following 6 cycles of Xeloda, CT 6/6/18  showed stable findings, no evidence of progressive disease.  There was a comment regarding subcutaneous abnormality in the anterior abdominal wall and this was related to Lovenox injection sites.  Bone scan 6/6/18 showed no interval change.   CT scan and bone scan 8/13/18 following 9 cycles of Xeloda showed stable findings with no evidence of significant visceral metastases.  Her bone lesions appear stable on bone scan.  The spindle cell neoplasm in her right chest wall actually decreased in size from 2 cm down to 1.6 cm.    The patient experienced some symptoms of diarrhea, anorexia, generalized weakness during cycle 9 Xeloda it was unclear whether this was related to a viral gastroenteritis or toxicity from treatment.  Symptoms recurred during cycle 10 and treatment was cut short by 2 days.  Symptoms attributed to Xeloda.  With cycle 11, dose and schedule altered to 1500 mg twice daily for 7 days on followed by 7 days off .  CT scan 9/9/2020 with no significant changes.  Bone scan 9/9/2020 read as unchanged from prior studies however did note an area of slight activity in the medial left femur.  Contacted radiology and although this was not noted on prior reports appears to have been present.  Subsequent MRI left femur 9/21/2020 with cortical thickening and periosteal edema left iliac us muscle insertion to the medial left femur with no evidence of metastatic disease, favored to represent periosteal change secondary to insertional tendinitis.  Severe hand-foot symptoms causing sclerodactyly and limitation in finger movement prompted change in dosing in July 2021 with Xeloda decreased to 1000 mg  a.m., 1500 mg p.m. for 7 days on followed by 7 days off.  Patient was experiencing severe hand-foot syndrome related to Xeloda having developed skin peeling, sclerodactyly with limited use of her hands.  On 3/31/2022, Xeloda was held to allow for recovery.  Patient resumed Xeloda on 4/18/2022.  Patient required hospitalization 5/29 - 6/1/2022 with presumed viral gastroenteritis that was exacerbated by Xeloda.  Xeloda held briefly, resumed on 6/6/2022.  Patient again with worsening sclerodactyly, Xeloda held for 2 weeks from 10/3 - 10/17/2022, resumed at prior dose with improvement in symptoms.  Scans on 3/3/2023 with CT chest abdomen pelvis and bone scan showing stable findings.  CT chest abdomen pelvis 7/3/2023 with questionable 1 mm change in size right lower lobe nodule.  Additional small pulmonary nodules stable.  Stable bony metastatic disease.  Bone scan on 7/7/2023 however showed slight progression focal involvement right ischium and superior pubic ramus (new ischial lesion superior pubic ramus compared to 10/24/2022 and more conspicuous compared to 3/3/2023).  Metastatic lesion right inferior pubic ramus and ischial tuberosity increased in size since October 2022 however stable from 3/3/2023.  MRI cervical spine 7/3/2023 with no metastatic involvement however identification of the lesion at T2, recommended dedicated thoracic spine MRI to evaluate further.  Given the minimal extent and slow degree of progression, elected to continue oral Xeloda and evaluate further with MRI pelvis and thoracic spine.  Consideration of biopsy pelvic metastasis for repeat ER/AR/HER2/peter testing.    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  CA 15-3 on 7/10/2023 was 12.2, uninformative.  MRI thoracic spine 7/27/2023 with 1 cm metastatic focus seen in L1  MRI pelvis 7/27/2023 with metastatic foci identified right superior pubic ramus, right ischial tuberosity, inferior  pubic ramus, L5 body.  Chronic appearing posttraumatic and/or degenerative change in the posterior right ilium adjacent SI joint.  CT-guided biopsy right pubic ramus lesion on 8/31/2023.  Pathology showed no evidence of malignancy, showed markedly calcified and sclerotic mature lamellar bone.  Attempted decalcification but no evidence of malignancy.  CT chest abdomen pelvis 9/8/2023 and bone scan 9/11/2023 with stable findings.  CT chest abdomen pelvis 12/8/2023 with possible slight increase in right chest wall subcutaneous lesion (1.8 x 1.1 up to 1.8 x 1.4 cm) although may be related to altered positioning.  Nonpathologically enlarged right axillary and subpectoral lymph nodes slightly increased (patient notes she received RSV vaccine right arm just prior to scan).  Bone scan 12/11/2023 with stable findings.  CT chest abdomen pelvis 3/19/2024 with slight interval decrease in size of right axillary and bilateral subpectoral lymph nodes, right subcutaneous soft tissue nodule slightly smaller, stable pulmonary nodules, stable bone metastases.  Bone scan 3/19/2024 with stable findings.  CT chest abdomen pelvis 6/21/2024 showed left subpectoral lymph node to have increased slightly in size (0.9 x 0.6 up to 1.3 x 0.8 cm).  Subcutaneous nodule right chest wall decreased from 1.1 x 1.9 down to 1.1 x 1.4 cm.  Scan otherwise stable.  Bone scan 6/21/2024 with stable findings  CT chest abdomen pelvis 9/23/2024 with resolution of left subpectoral lymphadenopathy, additional findings stable.  Bone scan 9/23/2024 with stable findings.  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  She stopped Xeloda 1 week prior to surgery.  She resumed Xeloda on 1/14/2025 at previous dosing schedule  CT chest abdomen pelvis 2/14/2025 showed stable findings and bone, no evidence of visceral disease.  Bone scan with increased activity right L5/S1 disc space felt to be  degenerative with unchanged CT appearance of the vertebral bodies.  Postoperative activity noted left midfoot.  Previous sites of disease activity stable.  3/31/2025: Patient returns today for triage visit.  She is suppose to be on treatment with Xeloda 1000 mg in the a.m., 1500 mg in the p.m. 7 days on followed by 7 days off.  She also continues on Xgeva every 3 months, last received 1/14/2025.  She has been on single agent Xeloda since 2/7/2018.  She is seen for triage visit today due to stomach bloating and intermittent loose stool.  She reports overall feeling unwell.  She stopped taking all of her home medications about 1 month ago.  She has not taken her Xeloda regularly for the last 2 cycles, typically taking 3 days of Xeloda and then discontinuing.  She reports stopping Xeloda because she was worried it was because her did not feel good, however agrees that she did not feel any different when she was on/off the Xeloda.  She reports having significantly decreased appetite and her weight is down.  Her son is present, and they discuss having a lot of social stressors, particularly as they navigate the death of the patient's  last year.  She does feel that a lot of of her symptoms could be related to stress.  Reviewed with Dr. Hancock, we will have patient resume all of her home medications, and follow-up with us in 1 week.  As long as she is doing okay next week, we will have her resume her Xeloda.  Will also get patient set up with  to see if there is anything we can do for financial stressors.  Patient also follows regularly with Jade, will send message to see if she is able to get her in sooner.  4/7/2025 patient to restart Xeloda today.  She has follow-up with supportive oncology tomorrow.  I will see her back in additional close follow-up next week when she will be due for Xgeva, being given every 3 months.  4/14/2025 due today for Xgeva, being given every 3 months.  Also continuing on  Xeloda as outlined above.     *Right pulmonary embolism:  Diagnosed on CT angiogram 10/21/17 involving small right lower lobe pulmonary artery.  Lower extremity Dopplers negative.  Bilateral lower extremity Dopplers negative again 12/5/17.  Received chronic Lovenox 1 mg/kg twice per day, transitioned to oral Eliquis in February 2019, continuing on Eliquis 5 mg twice daily.  Patient continues on Eliquis 5 mg twice daily     *Cancer related pain:  Previously receiving Duragesic 50 µg patch every 72 hours along with Dilaudid 4 mg as needed for breakthrough pain  The patient's pain improved over time and she was able to discontinue both Duragesic and Dilaudid in the interval.  Patient does take occasional Flexeril at bedtime due to back spasm/pain when she has been more active.  The patient does have some occasional aggravation of her chronic back pain and does use tramadol 50 mg every 8 hours as needed  Patient was receiving tramadol 50 mg every 8-12 hours as needed in addition to hydrocodone 5/325 every 4 hours as needed.  She was also taking OTC NSAIDs.  Patient developed nausea related to hydrocodone and was transitioned to Percocet 5/325 every 4 hours as needed, advised to stop taking NSAIDs with ongoing anticoagulation.  4/7/2025 patient continues with chronic pain in her shoulders.  Agreeable today to refill her tramadol but patient would like to be referred to pain management which seems appropriate for this ongoing significant pain that is multifaceted.     *Chemotherapy-induced diarrhea with subsequent C. difficile colitis in the setting of previous ulcerative colitis and then identification of enteropathogenic E. coli:  Patient with reported history of ulcerative colitis, continues on mesalamine.  The patient developed initial diarrhea related to Xeloda at regular dosing.  Symptoms improved on reduced dose Xeloda  Flare of symptoms in October 2018 with apparent finding of C. difficile colitis by GI, treated  with course of oral vancomycin with improvement in symptoms.  Patient notes minimal intermittent diarrhea/loose stools on Xeloda requiring occasional dosing of Imodium.    Patient required hospitalization 5/29 - 6/1/2022 with presumed viral gastroenteritis that was exacerbated by Xeloda.  Xeloda held briefly, resumed on 6/6/2022.  Patient's  developed C. difficile colitis and patient was experiencing increase in diarrhea.  Stool studies performed 8/4/22 negative C. difficile however GI PCR was positive for enteropathogenic E. coli.  Given patient's symptoms and immunocompromise status it was elected to treat her with azithromycin 500 mg daily x3 days beginning 8/5/2022  Diarrhea resolved  Patient underwent colonoscopy on 2/28/2023 with findings of diverticulosis  Patient had been experiencing intermittent episodes of diarrhea however is now constipated related to narcotics.  We discussed starting that regimen with Senokot 1 to 2 tablets twice daily and adding MiraLAX daily if needed.   3/31/2025: Reports having intermittent diarrhea.  Some days bowels move regularly, other days has what she considers a pudding consistency bowel movement.  On those days, typically only having 1 bowel movement.  She is also complaining of abdominal bloating and increased gas, recommended gas-x.     *Traumatic left tibia/fibular fracture:  Status post ORIF 12/6/17  Specimen was sent for pathologic review, negative for evidence of malignancy     *Hypercalcemia:  Suspect hypercalcemia of malignancy, calcium in  10-11 range previously.  Calcium normalized following initiation of monthly Xgeva on 1/23/18.     *Chemotherapy-induced mucositis:  Patient has not experienced any recent difficulty with mucositis.     *Recurrent UTI, bladder wall thickening on CT:  Patient had an enterococcal UTI on 3/2/18 sensitive to nitrofurantoin and received treatment, unclear how long.  Recurrent UTI 3/20/18 with urine culture growing Klebsiella,  initially treated with nitrofurantoin, transitioned to Levaquin.  CT 4/4/18 with diffuse bladder wall thickening with increased nodular thickening at the left base.  Referral to urogynecology Dr. May Johnson.  She was placed on a prophylactic dose of trimethoprim 100 mg daily, bladder wall thickening felt to be related to recent recurrent urinary tract infections.  Patient with urinary symptoms, treated with course of Macrobid at the end of December 2018, urine culture however was negative 12/31/18.  Patient was found to have Klebsiella UTI 7/29/2019 which was successfully treated with Macrobid with complete resolution of symptoms.  CT 8/10/2021 with diffuse bladder wall thickening new from 5/17/2021 (however seen on multiple prior scans).    UTI on 10/18/2021 with E. coli greater than 100,000 colonies that was pansensitive, treated with Macrobid x7 days  With ongoing/recurrent UTIs patient was seen by urogynecology and initiated suppressive therapy with trimethoprim 100 mg daily in December 2021.  She has not experienced any further urinary tract infections.  CT abdomen pelvis 3/28/2022 does show diffuse thickening of urinary bladder as has been seen on prior studies indicative of cystitis.  Patient notes that she did stop taking trimethoprim on a daily basis.    Patient with urine culture on 5/5/2023 that grew E. coli and she received antibiotic treatment from urogynecology.    Urine culture on 8/23/2023 with greater than 100,000 colonies of E. coli resistant to ampicillin and Bactrim.  Received 5-day course of Cipro with resolution of symptoms.  Patient did have UTI with E. coli on culture 10/2/2023, received a course of Augmentin.  3/31/2025: Patient admits she did not complete course of antibiotics for UTI last month.  UA today suspicious for UTI.  Patient reports previously being on Cipro and tolerating this well, so we will send prescription for Cipro while we await culture.  4/7/2025 symptoms currently  resolved.     *Charcot-Saloni-Tooth with mobility difficulties:  The patient underwent an intensive course of [facility] at [facility].  She graduated from her outpatient course November 2018.  Overall the patient has improved dramatically in terms of mobility,   Patient developed significant callus formation lateral aspect of the left plantar surface.    Patient developed skin erosion and an ulceration on the lateral aspect of her left foot.    Patient required surgery on her left foot by Dr. Lopez on 12/10/2024  Patient is continuing to recover from her left foot surgery in December.  She is currently in a boot, remains in a wheelchair.  She is starting physical therapy.     *Depression:  The patient is continuing follow-up in the supportive oncology clinic and is currently continuing on Cymbalta 30 mg twice daily as well as gabapentin 900 mg nightly, Ativan 0.5 mg nightly as needed, Provigil 100 mg daily.  Patient does continue to attend support groups at [employer].  The patient does continue routine follow-up in supportive oncology clinic.    Patient does have multiple stressors with her 's malignancy and chemotherapy as well as her autistic son who is living with them at home.  Patient continues to deal with grief related to her 's death.  She has seen a grief counselor through hospice which helped significantly.  She continues routine follow-up with supportive oncology as well.  3/31/2025: Patient has been struggling emotionally over the last month.  She stopped taking all of her home medications about 1 month ago, including her Xeloda.  She is having significantly decreased appetite, and has lost some weight.  She is tearful today, she discusses a lot of social stressors going on related to family and her 's death.  Will refer to  for financial resources.  She follows regularly with Jade, will send message to see if Jade is able to get her in sooner.  Otherwise,  recommended patient resume her home medications.  Patient continuing on Klonopin as needed for anxiety.  She continues to follow with supportive oncology.      *Hand-foot syndrome secondary to Xeloda:  Patient continues with frequent application of emollient cream to the hands and feet  Symptoms increased significantly requiring a 1 week delay in cycle 18 Xeloda as noted above.  Symptoms did improve and she continued on the same dose.    Patient was referred to dermatology and has been continuing on triamcinolone 0.1% cream used for 1 week on followed by 1 week off which led to some further improvement.   Progressive palmar erythema with development of sclerodactyly and effect on dexterity.  Addition of urea-based cream 3 times daily.  Patient with continued difficulty regarding erythema of her hands that extended onto the dorsal aspect and was causing contractures/sclerodactyly in her fingers affecting her dexterity.  In July 2021, held Xeloda for an additional week and then reduced dose from 1500 mg twice daily down to 1000 mg a.m. and 1500 mg p.m. and continued on a 7-day on followed by 7-day off schedule.  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  On 3/31/2022, Xeloda was held to allow for recovery.  Patient resumed Xeloda on 4/18/2022.  Patient developed worsening sclerodactyly affecting dexterity that required Xeloda to again be briefly held for 2 weeks from 10/3 - 10/17/2022.  Treatment resumed at prior dose after improvement in symptoms.  3/31/2025: Patient admits she has not been using emollient cream or topical triamcinolone since December.  She has not been taking Xeloda regularly over the last month.  With recent break in Xeloda treatment for surgery, symptoms have  improved with decrease in degree of sclerodactyly, improvement in mobility of her fingers.  The degree of erythema on the dorsal aspect of the hand has diminished.  She does have some degree of dry skin and skin cracking in her palms and was encouraged to resume using her emollient cream frequently, especially since we are hoping to resume Xeloda next week.     *Evidence of steatosis on scans with mild intermittent elevated liver function studies:  Liver function studies increased 8/20/19 with ALT 98, AST 70, normal total bilirubin.  Negative viral hepatitis A, B, and C panel 8/23/18  Likely related to hepatic steatosis.   Subsequent improvement in LFTs  LFTs today are normal     *Chemotherapy induced leukopenia:  WBC today is normal at 5.22.     *GERD, question of celiac disease:  Patient with significant history of reflux  Patient currently receiving Protonix 40 mg daily daily and Carafate 1 g 4 times daily as needed  Patient required hospitalization 5/29 - 6/1/2022 with presumed viral gastroenteritis that was exacerbated by Xeloda.    EGD performed 5/31/2022 during hospitalization with biopsy that showed focal blunting of villi and atrophy with slight increase in intraepithelial lymphocytes.  Changes were minimal but recommended correlation with serology to exclude celiac disease.  Celiac serology 8/8/2022 negative  Patient previously developed worsening reflux symptoms, temporarily increase Protonix to 40 mg twice daily and we did add Carafate 1 g 4 times daily.    She is taking Protonix 40 mg once daily, is not taking Carafate.     *Insomnia:  Patient with prior paradoxical reaction to Benadryl  Improved previously on temazepam 15 mg nightly as needed.   Patient noted subsequently that temazepam was having no effect.   Patient continuing on gabapentin 900 mg nightly     *Health maintenance:  Patient notes that she has a history of colon polyps as well as ulcerative colitis and was due for a follow-up  colonoscopy on 9/12/2019.  We did discuss there is no necessity to pursue colonoscopy in the setting of her metastatic breast cancer.    The patient did undergo colonoscopy on 2/7/2020 with findings of muscular hypertrophy and diverticulosis.   Patient did wish to proceed with further colonoscopic evaluation, performed on 12/20/2022 with poor prep.  Repeat 2/28/2023 with diverticulosis.     *Bilateral shoulder pain:  Chronic difficulty with right shoulder although she has not complained of this in prior visits to our office.  She reported difficulty with abduction.    The patient did undergo MRI of her right shoulder on 11/21/2019 at an outside facility showing multiple abnormalities including supraspinatus tendinosis, labral tear but no evidence of metastatic disease.  Patient developed pain in the left shoulder, was seen by orthopedics and underwent steroid injection with some improvement.  Right shoulder stable.  Patient did undergo physical therapy with some improvement.  She continues to use tramadol 50 mg every 8-12 hours as needed.  Note that current CT 10/20/2022 made notation of left shoulder probable bursitis.    4/7/2025 Patient continues with bilateral shoulder pain which does wax and wane.  Recently right shoulder pain has been more problematic, exacerbated by using her arms to assist with transfers and ability after foot surgery.  See further discussion above.     *Ocular changes in part related to Xeloda:  Patient experienced a mild degree of blurred vision as well as burning and pruritus.  She was seen by her ophthalmologist and was placed on xiidra ophthalmic drops which have helped.  Likely both issues are to some extent related to Xeloda.  Symptoms did worsen and patient was seen by ophthalmologist at [facility].  She is now using an ocular lubricant at night in addition to artificial tears which have helped.  Symptoms currently stable to improved     *Elevated glucose:  It is noted  the patient's glucose at the last few visits has been in the high 100 range, postprandial.  She has had a hemoglobin A1c that has been in the high 5-6 range in the past  Hemoglobin A1c was last checked on 12/3/2024 at 5.8     *Possible loosening of pedicle screw at T3:  CT cervical spine 5/17/2021 showed loosening of the left pedicle screw at T3.  Patient referred to orthopedics and was seen by Dr. Nayak who felt that there were no concerning findings on the scan     *Iron deficiency anemia  Labs on 5/2/2022 with hemoglobin 10.8.  Additional labs with iron 48, ferritin 21.2, iron saturation 11%, TIBC 4 and 32, folate greater than 20, B12 greater than 2000.    Attempted treatment with oral iron daily however patient was intolerant  Patient subsequently received Venofer 300 mg weekly x4 beginning 6/6/2022 and completed on 6/27/2022  Subsequent iron studies on 7/11/2022 showed improvement with iron 62, ferritin 345, iron saturation 18%, TIBC 336.  Hemoglobin had improved up to 12.7 at that time.  Repeat iron studies on 10/3/2022 showed iron replete status with hemoglobin 13.7, iron 121, ferritin 208.7, iron saturation 31%, TIBC 392.  4/7/2025 patient is iron deficient again with hemoglobin down to 9.7, iron sat 5%, ferritin 60.  We will go ahead and look into getting her approved for IV iron to begin next week when she returns for her Xgeva.  4/14/2025 patient given stool cards to check for occult blood.  She will proceed with IV Injectafer x 2.     Plan:   Proceed with Xgeva today, being given every 3 months.  Continue Xeloda today at previous dosing of 1000 mg a.m., 1500 mg in the p.m. 7 days on followed by 7 days off (patient has been on single agent Xeloda since 2/7/2018)  Proceed with IV Injectafer x 2 beginning today.    Continue to follow with supportive oncology, LISA Lofton.   Continue Eliquis 5 mg twice daily  Continue frequent use of emollient cream currently 5 times daily and continue periodic  triamcinolone cream 0.1% twice daily (provided by dermatology) 2 weeks on followed by 2 weeks off as prescribed  Continue Protonix 40 mg daily  Continue ocular lubricating gel nightly per ophthalmology  Continue Provigil 100 mg daily and Cymbalta 30 mg twice daily.    Continue gabapentin 900 mg at night.   Continue tramadol 50 mg every 8 hours as needed for pain (being filled by Dr. Hancock).  Patient also referred last week to pain management, awaiting appointment.    Return in 1 week for Injectafer dose #2.  MD visit in mid May 2025 with CBC, CMP.  We will discuss timeframe for follow-up scans at that visit.     Patient continuing on high risk medication requiring intensive monitoring.

## 2025-04-15 ENCOUNTER — SPECIALTY PHARMACY (OUTPATIENT)
Dept: PHARMACY | Facility: HOSPITAL | Age: 65
End: 2025-04-15
Payer: MEDICARE

## 2025-04-15 ENCOUNTER — TELEPHONE (OUTPATIENT)
Dept: PSYCHIATRY | Facility: HOSPITAL | Age: 65
End: 2025-04-15
Payer: MEDICARE

## 2025-04-15 NOTE — TELEPHONE ENCOUNTER
Oncology Support Services    OSW received a referral from LISA Escamilla, requesting OSW follow up regarding financial questions.  OSW called the patient, explained the reason for the call and the patient verbalized understanding.      The patient explained the issue being one that relates to Social Security and an adult son who lives with the patient.  The patient has been able to make an appointment with Social Security and feels that she will be able to resolve it, once she talks to them.  OSW validated the patient having made an appointment and encouraged her in gathering as much documentation as possible, before the appointment to assist the representative in helping her.  The patient will plan to do that as well as contact her HR Benefits department regarding one other question.      OSW shared with the patient the ways our office assists patients with financial needs.  The patient may apply for  Financial Assistance, on behalf of her son.  We discussed the process and she will contact OSW with any follow up questions.    No other needs identified.    Steve Kapoor, MSW, LCSW  Oncology Social Worker

## 2025-04-15 NOTE — PROGRESS NOTES
Specialty Pharmacy Note: Xeloda (capecitabine)    Martha Davey is a 64 y.o. female with breast cancer was seen 4/14/25 by APRN. Per provider dictation, no changes to oral oncology regimen Xeloda (capecitabine).  Labs Review: The CMP and CBC from 4/14/25 have been reviewed. No dose adjustments are needed for the oral specialty medication(s) based on the labs.    Specialty pharmacy will continue to follow patient.    Lubna Gupta, DimitriosD, BCPS  4/15/2025  08:46 EDT

## 2025-04-21 ENCOUNTER — INFUSION (OUTPATIENT)
Dept: ONCOLOGY | Facility: HOSPITAL | Age: 65
End: 2025-04-21
Payer: MEDICARE

## 2025-04-21 VITALS
TEMPERATURE: 97.5 F | DIASTOLIC BLOOD PRESSURE: 84 MMHG | BODY MASS INDEX: 34.55 KG/M2 | RESPIRATION RATE: 15 BRPM | SYSTOLIC BLOOD PRESSURE: 141 MMHG | HEART RATE: 74 BPM | OXYGEN SATURATION: 97 % | HEIGHT: 60 IN | WEIGHT: 176 LBS

## 2025-04-21 DIAGNOSIS — D50.9 IRON DEFICIENCY ANEMIA, UNSPECIFIED IRON DEFICIENCY ANEMIA TYPE: ICD-10-CM

## 2025-04-21 DIAGNOSIS — T45.4X5A ADVERSE EFFECT OF IRON, INITIAL ENCOUNTER: Primary | ICD-10-CM

## 2025-04-21 PROCEDURE — 25010000002 FERRIC CARBOXYMALTOSE 750 MG/15ML SOLUTION 15 ML VIAL: Performed by: NURSE PRACTITIONER

## 2025-04-21 PROCEDURE — 63710000001 PROCHLORPERAZINE MALEATE PER 5 MG: Performed by: NURSE PRACTITIONER

## 2025-04-21 PROCEDURE — A9270 NON-COVERED ITEM OR SERVICE: HCPCS | Performed by: NURSE PRACTITIONER

## 2025-04-21 PROCEDURE — 96374 THER/PROPH/DIAG INJ IV PUSH: CPT

## 2025-04-21 RX ORDER — PROCHLORPERAZINE MALEATE 5 MG/1
10 TABLET ORAL ONCE
Status: COMPLETED | OUTPATIENT
Start: 2025-04-21 | End: 2025-04-21

## 2025-04-21 RX ADMIN — PROCHLORPERAZINE MALEATE 10 MG: 5 TABLET ORAL at 13:42

## 2025-04-21 RX ADMIN — SODIUM CHLORIDE 750 MG: 9 INJECTION, SOLUTION INTRAVENOUS at 14:01

## 2025-04-24 ENCOUNTER — LAB (OUTPATIENT)
Dept: LAB | Facility: HOSPITAL | Age: 65
End: 2025-04-24
Payer: MEDICARE

## 2025-04-24 DIAGNOSIS — D50.8 OTHER IRON DEFICIENCY ANEMIA: ICD-10-CM

## 2025-04-24 LAB
COLLECT DATE SP2 STL: NORMAL
COLLECT DATE SP3 STL: NORMAL
COLLECT DATE STL: NORMAL
GASTROCULT GAST QL: NEGATIVE
HEMOCCULT SP2 STL QL: NEGATIVE
HEMOCCULT SP3 STL QL: NEGATIVE
Lab: 12

## 2025-04-24 PROCEDURE — 82272 OCCULT BLD FECES 1-3 TESTS: CPT

## 2025-05-07 ENCOUNTER — OFFICE VISIT (OUTPATIENT)
Dept: PSYCHIATRY | Facility: HOSPITAL | Age: 65
End: 2025-05-07
Payer: MEDICARE

## 2025-05-07 DIAGNOSIS — F43.21 GRIEF: ICD-10-CM

## 2025-05-07 DIAGNOSIS — R53.0 NEOPLASTIC MALIGNANT RELATED FATIGUE: ICD-10-CM

## 2025-05-07 DIAGNOSIS — C50.919 PRIMARY MALIGNANT NEOPLASM OF BREAST WITH METASTASIS: Primary | ICD-10-CM

## 2025-05-07 DIAGNOSIS — F33.1 MAJOR DEPRESSIVE DISORDER, RECURRENT EPISODE, MODERATE: ICD-10-CM

## 2025-05-07 DIAGNOSIS — G47.33 OSA (OBSTRUCTIVE SLEEP APNEA): ICD-10-CM

## 2025-05-07 PROCEDURE — 99214 OFFICE O/P EST MOD 30 MIN: CPT | Performed by: NURSE PRACTITIONER

## 2025-05-07 PROCEDURE — 1160F RVW MEDS BY RX/DR IN RCRD: CPT | Performed by: NURSE PRACTITIONER

## 2025-05-07 PROCEDURE — 1159F MED LIST DOCD IN RCRD: CPT | Performed by: NURSE PRACTITIONER

## 2025-05-07 RX ORDER — MODAFINIL 200 MG/1
200 TABLET ORAL DAILY
Qty: 30 TABLET | Refills: 2 | Status: SHIPPED | OUTPATIENT
Start: 2025-05-07

## 2025-05-07 RX ORDER — DULOXETIN HYDROCHLORIDE 30 MG/1
30 CAPSULE, DELAYED RELEASE ORAL DAILY
Qty: 90 CAPSULE | Refills: 0 | Status: SHIPPED | OUTPATIENT
Start: 2025-05-07

## 2025-05-07 RX ORDER — DULOXETIN HYDROCHLORIDE 60 MG/1
60 CAPSULE, DELAYED RELEASE ORAL DAILY
Qty: 90 CAPSULE | Refills: 0 | Status: SHIPPED | OUTPATIENT
Start: 2025-05-07

## 2025-05-07 NOTE — PROGRESS NOTES
"rSupportive Oncology Services  In Person Session    Subjective  Patient ID: Martha Davey is a 64 y.o. female is seen face to face in the [facility]cology [facility] ([facility].    CC: Anxiety, depression, grief    HPI/ Interval History:   Pt is seen in follow up regarding persistent symptoms of depression on continued treatment for metastatic breast cancer. Continues to mourn complex family dynamics, grieve recent loss of , specifically endorsing anxious anticipation of 's birthday upcoming. Continues to manage home needs, while recognzing she continues to sleep later in the morning than previously. Additionally recognizies ongoing burden of pain in shoulder, anticipating referra lto pain mgmt to assit. Does appreciate appropriate concentration and focus which allow completion of desired tasks. Feels this has been partially related to election to increase modafinil to 200 mg daily, tolerating this well. Denies resurgent palpitations or other SE.     Sleep is complicated, going to bed appx 11 PM, often allowing self to sleep until 11 AM. Is able to get up earlier when needed, electing to schedule responsibilities earlier to assist with intentional awakening. States house is a mess, and identifies importance of engaging more regularly in tasks around home. Does feel rainy weather has contributed, continuing to endorse feelings of demoralization surrounding increasing dependence. Hopeful to be able to drive again in a few weeks.    Presents to clinic today with friend, appreciating close rapport with friends and sons, engaging relatively frequently. Does report continued conflict in various realms, attempting to communicate openly as needed. Mood remains complicated. Denies disrupted interests. Endorses feeling of demoralization, disappointed with inability to engage independently as she wants. Appreciates love and support of children while feeling like \"parent has become child.\"     Has " disconnected from therapist, last seen in November.     Exam: As above    Recent Labs Reviewed:  Lab on 04/24/2025   Component Date Value    Occult Blood 04/24/2025 Negative     Occult Blood 2 04/24/2025 Negative     Occult Blood 3 04/24/2025 Negative     OB Date 1 04/24/2025 4/17/2025     OB Time 1 04/24/2025 12     OB Date 2 04/24/2025 4/18/2025     OB Time 2 04/24/2025 12     OB Date 3 04/24/2025 4/20/2025     OB Time 3 04/24/2025 12    Lab on 04/14/2025   Component Date Value    Glucose 04/14/2025 125 (H)     BUN 04/14/2025 27 (H)     Creatinine 04/14/2025 0.82     Sodium 04/14/2025 138     Potassium 04/14/2025 4.7     Chloride 04/14/2025 100     CO2 04/14/2025 29.7 (H)     Calcium 04/14/2025 9.5     Total Protein 04/14/2025 7.0     Albumin 04/14/2025 3.9     ALT (SGPT) 04/14/2025 6     AST (SGOT) 04/14/2025 17     Alkaline Phosphatase 04/14/2025 69     Total Bilirubin 04/14/2025 0.2     Globulin 04/14/2025 3.1     A/G Ratio 04/14/2025 1.3     BUN/Creatinine Ratio 04/14/2025 32.9 (H)     Anion Gap 04/14/2025 8.3     eGFR 04/14/2025 80.0     Magnesium 04/14/2025 2.0     Phosphorus 04/14/2025 3.9     WBC 04/14/2025 5.22     RBC 04/14/2025 3.86     Hemoglobin 04/14/2025 10.5 (L)     Hematocrit 04/14/2025 34.2     MCV 04/14/2025 88.6     MCH 04/14/2025 27.2     MCHC 04/14/2025 30.7 (L)     RDW 04/14/2025 19.7 (H)     RDW-SD 04/14/2025 62.5 (H)     MPV 04/14/2025 10.2     Platelets 04/14/2025 488 (H)     Neutrophil % 04/14/2025 64.6     Lymphocyte % 04/14/2025 24.7     Monocyte % 04/14/2025 6.3     Eosinophil % 04/14/2025 2.7     Basophil % 04/14/2025 1.3     Immature Grans % 04/14/2025 0.4     Neutrophils, Absolute 04/14/2025 3.37     Lymphocytes, Absolute 04/14/2025 1.29     Monocytes, Absolute 04/14/2025 0.33     Eosinophils, Absolute 04/14/2025 0.14     Basophils, Absolute 04/14/2025 0.07     Immature Grans, Absolute 04/14/2025 0.02     nRBC 04/14/2025 0.0    Lab on 04/07/2025   Component Date Value    Ferritin  04/07/2025 60.70     Iron 04/07/2025 17 (L)     Iron Saturation (TSAT) 04/07/2025 5 (L)     Transferrin 04/07/2025 237     TIBC 04/07/2025 353     Glucose 04/07/2025 163 (H)     BUN 04/07/2025 19     Creatinine 04/07/2025 0.77     Sodium 04/07/2025 140     Potassium 04/07/2025 4.2     Chloride 04/07/2025 100     CO2 04/07/2025 30.7 (H)     Calcium 04/07/2025 9.9     Total Protein 04/07/2025 7.2     Albumin 04/07/2025 3.7     ALT (SGPT) 04/07/2025 6     AST (SGOT) 04/07/2025 12     Alkaline Phosphatase 04/07/2025 71     Total Bilirubin 04/07/2025 0.2     Globulin 04/07/2025 3.5     A/G Ratio 04/07/2025 1.1     BUN/Creatinine Ratio 04/07/2025 24.7     Anion Gap 04/07/2025 9.3     eGFR 04/07/2025 86.3     WBC 04/07/2025 6.67     RBC 04/07/2025 3.60 (L)     Hemoglobin 04/07/2025 9.9 (L)     Hematocrit 04/07/2025 31.8 (L)     MCV 04/07/2025 88.3     MCH 04/07/2025 27.5     MCHC 04/07/2025 31.1 (L)     RDW 04/07/2025 19.2 (H)     RDW-SD 04/07/2025 62.1 (H)     MPV 04/07/2025 10.0     Platelets 04/07/2025 366     Neutrophil % 04/07/2025 68.5     Lymphocyte % 04/07/2025 19.5 (L)     Monocyte % 04/07/2025 9.1     Eosinophil % 04/07/2025 2.2     Basophil % 04/07/2025 0.6     Immature Grans % 04/07/2025 0.1     Neutrophils, Absolute 04/07/2025 4.56     Lymphocytes, Absolute 04/07/2025 1.30     Monocytes, Absolute 04/07/2025 0.61     Eosinophils, Absolute 04/07/2025 0.15     Basophils, Absolute 04/07/2025 0.04     Immature Grans, Absolute 04/07/2025 0.01     nRBC 04/07/2025 0.0    Lab on 03/31/2025   Component Date Value    Glucose 03/31/2025 118 (H)     BUN 03/31/2025 15     Creatinine 03/31/2025 0.83     Sodium 03/31/2025 143     Potassium 03/31/2025 4.3     Chloride 03/31/2025 101     CO2 03/31/2025 25.0     Calcium 03/31/2025 8.8     Total Protein 03/31/2025 7.0     Albumin 03/31/2025 3.7     ALT (SGPT) 03/31/2025 5     AST (SGOT) 03/31/2025 18     Alkaline Phosphatase 03/31/2025 70     Total Bilirubin 03/31/2025 0.3      Globulin 03/31/2025 3.3     A/G Ratio 03/31/2025 1.1     BUN/Creatinine Ratio 03/31/2025 18.1     Anion Gap 03/31/2025 17.0 (H)     eGFR 03/31/2025 78.8     Magnesium 03/31/2025 2.0     Urine Culture 03/31/2025 >100,000 CFU/mL Escherichia coli (A)     WBC 03/31/2025 6.29     RBC 03/31/2025 4.24     Hemoglobin 03/31/2025 11.7 (L)     Hematocrit 03/31/2025 38.3     MCV 03/31/2025 90.3     MCH 03/31/2025 27.6     MCHC 03/31/2025 30.5 (L)     RDW 03/31/2025 19.3 (H)     RDW-SD 03/31/2025 63.3 (H)     MPV 03/31/2025 9.9     Platelets 03/31/2025 450     Neutrophil % 03/31/2025 61.3     Lymphocyte % 03/31/2025 27.2     Monocyte % 03/31/2025 7.8     Eosinophil % 03/31/2025 2.5     Basophil % 03/31/2025 1.0     Immature Grans % 03/31/2025 0.2     Neutrophils, Absolute 03/31/2025 3.86     Lymphocytes, Absolute 03/31/2025 1.71     Monocytes, Absolute 03/31/2025 0.49     Eosinophils, Absolute 03/31/2025 0.16     Basophils, Absolute 03/31/2025 0.06     Immature Grans, Absolute 03/31/2025 0.01     nRBC 03/31/2025 0.0     Color, UA 03/31/2025 Sophia (A)     Appearance, UA 03/31/2025 Cloudy (A)     pH, UA 03/31/2025 5.5     Specific Gravity, UA 03/31/2025 >=1.030     Glucose, UA 03/31/2025 Negative     Ketones, UA 03/31/2025 Trace (A)     Bilirubin, UA 03/31/2025 Small (1+) (A)     Blood, UA 03/31/2025 Large (3+) (A)     Protein, UA 03/31/2025 100 mg/dL (2+) (A)     Leuk Esterase, UA 03/31/2025 Moderate (2+) (A)     Nitrite, UA 03/31/2025 Negative     Urobilinogen, UA 03/31/2025 1.0 E.U./dL    Labs reviewed    Current Psychotropic Medications:  Duloxetine 30 mg q AM, and 60 mg q hs  Modafinil 200 mg q AM     Plan of Care/ Medical Decision Making  Continue medications as written for sx of major depressive disorder, persistent hypersomnolence associated with depression, MARIA M, and chronic treatment for metastatic cancer.  MI utilized to assist with patient goals of increased productivity. Support scheduling of scheduled activities and  tasks.   Strongly support reconnection to therapist, and patient agrees to reschedule this.   FU scheduled in 4 weeks.    Diagnoses and all orders for this visit:    1. Metastatic breast cancer (Primary)    2. Neoplastic malignant related fatigue  -     modafinil (PROVIGIL) 200 MG tablet; Take 1 tablet by mouth Daily.  Dispense: 30 tablet; Refill: 2    3. Grief    4. Major depressive disorder, recurrent episode, moderate    5. MARIA M (obstructive sleep apnea)    Other orders  -     DULoxetine (Cymbalta) 30 MG capsule; Take 1 capsule by mouth Daily. Take in conjunction with 60 mg q evening.  Dispense: 90 capsule; Refill: 0  -     DULoxetine (Cymbalta) 60 MG capsule; Take 1 capsule by mouth Daily.  Dispense: 90 capsule; Refill: 0    I spent 36 minutes caring for Martha on this date of service. This time includes time spent by me in the following activities: preparing for the visit, reviewing tests, obtaining and/or reviewing a separately obtained history, performing a medically appropriate examination and/or evaluation, counseling and educating the patient/family/caregiver, ordering medications, tests, or procedures, and documenting information in the medical record.

## 2025-05-12 NOTE — PROGRESS NOTES
Chief Complaint  Previous Stage Ib ([id number]) ER/NV positive, HER-2/peter negative right breast cancer with subsequent metastatic disease identified 10/8/2017, history of right pulmonary embolism, cancer related pain, chemotherapy-induced diarrhea, chemotherapy-induced mucositis, chemotherapy-induced hand-foot syndrome    Subjective        History of Present Illness  The patient returns today in follow-up with laboratory studies, CT scans, bone scan to review continuing on oral Xeloda 1000 mg in the morning, 1500 mg in the evening for 7 days on followed by 7 days off as well as every 3-month Xgeva (last received on 4/14/2025) and Eliquis 5 mg twice daily.  The patient has been maintained on treatment with single agent Xeloda since 2/7/2018.  She does continue with chronic side effects from treatment including hand-foot syndrome and sclerodactyly.  She continues to use emollient cream frequently 4-5 times per day as well as topical triamcinolone intermittently (2 weeks on followed by 2 weeks off as instructed by dermatology).     Since her last visit, the patient did have some time off of chemotherapy.  She reports developing GI symptoms after a course of antibiotics for UTI.  In early March she stopped taking all of her medications due to ongoing diarrhea.  Her symptoms eventually improved and when she was seen back in early April she did resume all of her medications including Xeloda.  She reports that she has been doing reasonably well since that time.  Due to the time off chemotherapy, her chronic hand-foot symptoms from Xeloda have improved including her sclerodactyly and limitations on dexterity.  She continues to recover from her foot surgery, still has a left lower extremity boot in place.  She has a new brace in place on her right lower leg.  She continues with physical therapy 2 days/week.  She does report some intermittent mild diarrhea for which she takes Imodium with control of symptoms.  She notes a  "stable appetite.  She does note ongoing pain in her upper arms and in her right shoulder.  She has had chronic bilateral shoulder issues in the past.  She does feel like the pain in her upper arms is somewhat worse recently and is more of a constant pain.  She last underwent scans including CT chest abdomen pelvis and bone scan on 2/14/2025 which showed stable disease.  She maintains ECOG performance status of 1, ambulating today with use of a rollator.  She reports that her depressive symptoms do wax and wane but overall are under acceptable control.  Patient reports that she is starting today a week off of Xeloda.  She does continue follow-up with supportive oncology, is currently receiving Cymbalta 30 mg a.m., 60 mg p.m. as well as gabapentin 900 mg nightly, Provigil and increased dose recently of 200 mg daily.  She feels that her fatigue has improved on this dose.  She does note some mild nausea, does not have any Zofran available currently.  Patient did have recent iron deficiency and received Injectafer 750 mg IV on 4/14 and again 4/21/2025.  She tolerated this well.  She notes that fatigue has improved since receiving the iron.      Objective   Vital Signs:   /75   Pulse 92   Temp 98 °F (36.7 °C) (Oral)   Resp 17   Ht 152.4 cm (60\")   SpO2 96%   BMI 34.37 kg/m²     Physical Exam  Constitutional:       Appearance: She is well-developed.      Comments: Patient ambulating with a rollator   Eyes:      Conjunctiva/sclera: Conjunctivae normal.   Neck:      Thyroid: No thyromegaly.   Cardiovascular:      Rate and Rhythm: Normal rate and regular rhythm.      Heart sounds: No murmur heard.     No friction rub. No gallop.   Pulmonary:      Effort: No respiratory distress.      Breath sounds: Normal breath sounds.   Abdominal:      General: Bowel sounds are normal. There is no distension.      Palpations: Abdomen is soft.      Tenderness: There is no abdominal tenderness.   Musculoskeletal:      Comments: " Boot in place left lower extremity.  Brace in place right lower extremity   Lymphadenopathy:      Head:      Right side of head: No submandibular adenopathy.      Cervical: No cervical adenopathy.      Upper Body:      Right upper body: No supraclavicular adenopathy.      Left upper body: No supraclavicular adenopathy.   Skin:     General: Skin is warm and dry.      Findings: Rash present.      Comments: Hand-foot symptoms have improved in the hands bilaterally.  The skin on the palms of the hands is dry and cracked but no significant erythema currently.  Degree of sclerodactyly has decreased with increased mobility of her fingers.   Neurological:      Mental Status: She is alert and oriented to person, place, and time.      Cranial Nerves: No cranial nerve deficit.      Motor: No abnormal muscle tone.      Deep Tendon Reflexes: Reflexes normal.   Psychiatric:         Behavior: Behavior normal.           Result Review : Reviewed CBC, CMP from today.     Assessment and Plan     *Previous Stage Ib ([id number]) right breast cancer:  Diagnosed May 2010 with excisional biopsy for invasive ductal carcinoma, 1.3 cm, grade 2, ER 90%, VT 80%, HER-2/peter negative (1+ IHC).    Subsequent right mastectomy in July 2010 with no residual breast malignancy, 1/5 sentinel lymph node with micrometastasis (0.25 mm).    Treated in the [facility] system with adjuvant AC ×4 cycles in 2010 (no taxanes administered due to underlying Charcot-Saloni-Tooth with peripheral neuropathy).    Adjuvant Femara (postmenopausal) initiated October 2010 with plan to treat ×10 years.    Genetic testing reportedly negative.    Developed osteopenia treated with Prolia beginning 2/27/13. Subsequently discontinued due to identification of metastatic disease.    *Recurrent/metastatic disease identified 10/8/17:  Disease involving thoracic spine with cord compression at T6, lumbosacral involvement, sternal and right sternoclavicular involvement.    Femara  discontinued in 10/2017.    Radiation administered (in the [facility] system) to the thoracic spine beginning 10/19/17 treating T3-T9 to a dose of 24 gray in 6 fractions.  Evaluation with MRI 12/8/17 showing persistent T6 cord compression with persistent neurologic compromise requiring surgical treatment 12/11/17 with T6 laminectomy/corpectomy and T3-T9 fusion.  Pathology with metastatic carcinoma of breast origin, ER negative, WA negative, HER-2/peter negative (1+ IHC).  Additional staging evaluation 12/8/17 with no evidence of visceral metastatic disease, bone scan showing involvement of thoracic spine, sternum, left humerus, mid frontal bone.  No plane film correlate of left humerus lesion.  MRI lumbar spine with small intradural L3 metastasis.  CA 15-3 12/6/17- 17.  Palliative radiation therapy to L3 dural metastasis and left humerus initiated 1/15/18 (10 fractions), completed 1/26/18.  Hypercalcemia of malignancy with calcium in the 10-11 range.  Initiation of monthly Xgeva 1/23/18.  Baseline CT scan 1/30/18 with no evidence of visceral involvement.  Cluster of nodular opacities in the right lower lobe suspected to be infectious or related to bronchiolitis. Bone scan 1/30/18 showed postsurgical change in the thoracic spine, stable uptake in the frontal bone, no new areas of disease.  Initiation of palliative oral single agent Xeloda 2/7/18 2000 mg a.m., 1500 mg p.m. for 14/21 days.   Following 3 cycles xeloda, bone scan 4/4/18 showed no change from the prior study.  CT scan 4/4/18 showed a small pericardial effusion of unclear significance as well as a subcutaneous nodule in the right lateral chest wall.  Subsequent evaluation with echocardiogram 4/17/18 showed no evidence of pericardial effusion.  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  We discussed the possibility of  surgical excision of the right subcutaneous chest wall lesion for more definitive diagnosis.  Reviewed previous CT images dating back to 12/8/17 and the lesion was present even at that time measuring around 1.7 cm although not commented on in the radiology report.  As this appears to be an indolent low-grade process unrelated to her breast cancer, recommendee foregoing surgical excision at this time and monitoring this area on future scans.  The patient agreed.    Following 6 cycles of Xeloda, CT 6/6/18  showed stable findings, no evidence of progressive disease.  There was a comment regarding subcutaneous abnormality in the anterior abdominal wall and this was related to Lovenox injection sites.  Bone scan 6/6/18 showed no interval change.   CT scan and bone scan 8/13/18 following 9 cycles of Xeloda showed stable findings with no evidence of significant visceral metastases.  Her bone lesions appear stable on bone scan.  The spindle cell neoplasm in her right chest wall actually decreased in size from 2 cm down to 1.6 cm.    The patient experienced some symptoms of diarrhea, anorexia, generalized weakness during cycle 9 Xeloda it was unclear whether this was related to a viral gastroenteritis or toxicity from treatment.  Symptoms recurred during cycle 10 and treatment was cut short by 2 days.  Symptoms attributed to Xeloda.  With cycle 11, dose and schedule altered to 1500 mg twice daily for 7 days on followed by 7 days off .  CT scan 9/9/2020 with no significant changes.  Bone scan 9/9/2020 read as unchanged from prior studies however did note an area of slight activity in the medial left femur.  Contacted radiology and although this was not noted on prior reports appears to have been present.  Subsequent MRI left femur 9/21/2020 with cortical thickening and periosteal edema left iliac us muscle insertion to the medial left femur with no evidence of metastatic disease, favored to represent periosteal change  secondary to insertional tendinitis.  Severe hand-foot symptoms causing sclerodactyly and limitation in finger movement prompted change in dosing in July 2021 with Xeloda decreased to 1000 mg a.m., 1500 mg p.m. for 7 days on followed by 7 days off.  Patient was experiencing severe hand-foot syndrome related to Xeloda having developed skin peeling, sclerodactyly with limited use of her hands.  On 3/31/2022, Xeloda was held to allow for recovery.  Patient resumed Xeloda on 4/18/2022.  Patient required hospitalization 5/29 - 6/1/2022 with presumed viral gastroenteritis that was exacerbated by Xeloda.  Xeloda held briefly, resumed on 6/6/2022.  Patient again with worsening sclerodactyly, Xeloda held for 2 weeks from 10/3 - 10/17/2022, resumed at prior dose with improvement in symptoms.  Scans on 3/3/2023 with CT chest abdomen pelvis and bone scan showing stable findings.  CT chest abdomen pelvis 7/3/2023 with questionable 1 mm change in size right lower lobe nodule.  Additional small pulmonary nodules stable.  Stable bony metastatic disease.  Bone scan on 7/7/2023 however showed slight progression focal involvement right ischium and superior pubic ramus (new ischial lesion superior pubic ramus compared to 10/24/2022 and more conspicuous compared to 3/3/2023).  Metastatic lesion right inferior pubic ramus and ischial tuberosity increased in size since October 2022 however stable from 3/3/2023.  MRI cervical spine 7/3/2023 with no metastatic involvement however identification of the lesion at T2, recommended dedicated thoracic spine MRI to evaluate further.  Given the minimal extent and slow degree of progression, elected to continue oral Xeloda and evaluate further with MRI pelvis and thoracic spine.  Consideration of biopsy pelvic metastasis for repeat ER/SD/HER2/peter testing.    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  CA 15-3 on 7/10/2023 was 12.2,  uninformative.  MRI thoracic spine 7/27/2023 with 1 cm metastatic focus seen in L1  MRI pelvis 7/27/2023 with metastatic foci identified right superior pubic ramus, right ischial tuberosity, inferior pubic ramus, L5 body.  Chronic appearing posttraumatic and/or degenerative change in the posterior right ilium adjacent SI joint.  CT-guided biopsy right pubic ramus lesion on 8/31/2023.  Pathology showed no evidence of malignancy, showed markedly calcified and sclerotic mature lamellar bone.  Attempted decalcification but no evidence of malignancy.  CT chest abdomen pelvis 9/8/2023 and bone scan 9/11/2023 with stable findings.  CT chest abdomen pelvis 12/8/2023 with possible slight increase in right chest wall subcutaneous lesion (1.8 x 1.1 up to 1.8 x 1.4 cm) although may be related to altered positioning.  Nonpathologically enlarged right axillary and subpectoral lymph nodes slightly increased (patient notes she received RSV vaccine right arm just prior to scan).  Bone scan 12/11/2023 with stable findings.  CT chest abdomen pelvis 3/19/2024 with slight interval decrease in size of right axillary and bilateral subpectoral lymph nodes, right subcutaneous soft tissue nodule slightly smaller, stable pulmonary nodules, stable bone metastases.  Bone scan 3/19/2024 with stable findings.  CT chest abdomen pelvis 6/21/2024 showed left subpectoral lymph node to have increased slightly in size (0.9 x 0.6 up to 1.3 x 0.8 cm).  Subcutaneous nodule right chest wall decreased from 1.1 x 1.9 down to 1.1 x 1.4 cm.  Scan otherwise stable.  Bone scan 6/21/2024 with stable findings  CT chest abdomen pelvis 9/23/2024 with resolution of left subpectoral lymphadenopathy, additional findings stable.  Bone scan 9/23/2024 with stable findings.  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  She stopped Xeloda 1 week prior to surgery.  She resumed Xeloda on 1/14/2025  at previous dosing schedule  CT chest abdomen pelvis 2/14/2025 showed stable findings and bone, no evidence of visceral disease.  Bone scan with increased activity right L5/S1 disc space felt to be degenerative with unchanged CT appearance of the vertebral bodies.  Postoperative activity noted left midfoot.  Previous sites of disease activity stable.  Patient briefly discontinued Xeloda on her own for approximately 6 weeks from early March 2025 until mid April 2025 for unclear reasons.  Patient subsequently resumed treatment at prior dosing  The patient returns today continuing on treatment with Xeloda 1000 mg a.m., 1500 mg p.m. 7 days on followed by 7 days off.  She is continuing on Xgeva every 3 months, last received on 4/14/25.  She has been on single agent Xeloda since 2/7/2018.  Patient recently had an approximately 6-week period of time off of Xeloda from early March until mid April 2025.  She reports that she had experienced some diarrhea related to antibiotics she was taking for UTI and elected to go off all of her medications at that time.  She was significantly depressed at the time as well.  She subsequently resumed all of her medications in mid April 2025.  She has been tolerating the Xeloda well.  Her hand-foot symptoms have improved overall, sclerodactyly has improved somewhat related to this.  She does note some intermittent diarrhea that is relieved with Imodium.  She does report some worsening pain in her bilateral upper arms.  She also has intermittent pain in the right shoulder which has been chronic in nature.  Unclear to what extent  her arm pain is related to use of her rollator.  We will go ahead and move up repeat scans with CT chest abdomen pelvis and bone scan to be performed in 3 weeks prior to return visit here in 4 weeks to review results.  Patient will next be due for Xgeva on 7/7/2025.    *Right pulmonary embolism:  Diagnosed on CT angiogram 10/21/17 involving small right lower lobe  pulmonary artery.  Lower extremity Dopplers negative.  Bilateral lower extremity Dopplers negative again 12/5/17.  Received chronic Lovenox 1 mg/kg twice per day, transitioned to oral Eliquis in February 2019, continuing on Eliquis 5 mg twice daily.  Patient continues on Eliquis 5 mg twice daily    *Cancer related pain:  Previously receiving Duragesic 50 µg patch every 72 hours along with Dilaudid 4 mg as needed for breakthrough pain  The patient's pain improved over time and she was able to discontinue both Duragesic and Dilaudid in the interval.  Patient does take occasional Flexeril at bedtime due to back spasm/pain when she has been more active.  The patient does have some occasional aggravation of her chronic back pain and does use tramadol 50 mg every 8 hours as needed  Patient was receiving tramadol 50 mg every 8-12 hours as needed in addition to hydrocodone 5/325 every 4 hours as needed.  She was also taking OTC NSAIDs.  Patient developed nausea related to hydrocodone and was transitioned to Percocet 5/325 every 4 hours as needed, advised to stop taking NSAIDs with ongoing anticoagulation.  Patient has chronic pain in her shoulders that does wax and wane is unrelated to her malignancy.  She notes that right shoulder pain has been exacerbated following her left foot surgery and she has relied on her arms for transfers and to assist with mobility.  She is now experiencing increased pain in her upper arms which is fairly new.  She continues on tramadol 50 mg every 8 hours as needed and Percocet 7.5/325 every 4 hours as needed..  She usually requires 2 doses of tramadol per day and 1 dose of Percocet at night.  She is requesting a refill for tramadol today.    *Chemotherapy-induced diarrhea with subsequent C. difficile colitis in the setting of previous ulcerative colitis and then identification of enteropathogenic E. coli:  Patient with reported history of ulcerative colitis, continues on mesalamine.  The  patient developed initial diarrhea related to Xeloda at regular dosing.  Symptoms improved on reduced dose Xeloda  Flare of symptoms in October 2018 with apparent finding of C. difficile colitis by GI, treated with course of oral vancomycin with improvement in symptoms.  Patient notes minimal intermittent diarrhea/loose stools on Xeloda requiring occasional dosing of Imodium.    Patient required hospitalization 5/29 - 6/1/2022 with presumed viral gastroenteritis that was exacerbated by Xeloda.  Xeloda held briefly, resumed on 6/6/2022.  Patient's  developed C. difficile colitis and patient was experiencing increase in diarrhea.  Stool studies performed 8/4/22 negative C. difficile however GI PCR was positive for enteropathogenic E. coli.  Given patient's symptoms and immunocompromise status it was elected to treat her with azithromycin 500 mg daily x3 days beginning 8/5/2022  Diarrhea resolved  Patient underwent colonoscopy on 2/28/2023 with findings of diverticulosis  Patient had been experiencing intermittent episodes of diarrhea, relieved with use of Imodium    *Traumatic left tibia/fibular fracture:  Status post ORIF 12/6/17  Specimen was sent for pathologic review, negative for evidence of malignancy    *Hypercalcemia:  Suspect hypercalcemia of malignancy, calcium in  10-11 range previously.  Calcium normalized following initiation of monthly Xgeva on 1/23/18.    *Chemotherapy-induced mucositis:  Patient has not experienced any recent difficulty with mucositis.    *Recurrent UTI, bladder wall thickening on CT:  Patient had an enterococcal UTI on 3/2/18 sensitive to nitrofurantoin and received treatment, unclear how long.  Recurrent UTI 3/20/18 with urine culture growing Klebsiella, initially treated with nitrofurantoin, transitioned to Levaquin.  CT 4/4/18 with diffuse bladder wall thickening with increased nodular thickening at the left base.  Referral to urogynecology Dr. May Johnson.  She was placed on  a prophylactic dose of trimethoprim 100 mg daily, bladder wall thickening felt to be related to recent recurrent urinary tract infections.  Patient with urinary symptoms, treated with course of Macrobid at the end of December 2018, urine culture however was negative 12/31/18.  Patient was found to have Klebsiella UTI 7/29/2019 which was successfully treated with Macrobid with complete resolution of symptoms.  CT 8/10/2021 with diffuse bladder wall thickening new from 5/17/2021 (however seen on multiple prior scans).    UTI on 10/18/2021 with E. coli greater than 100,000 colonies that was pansensitive, treated with Macrobid x7 days  With ongoing/recurrent UTIs patient was seen by urogynecology and initiated suppressive therapy with trimethoprim 100 mg daily in December 2021.  She has not experienced any further urinary tract infections.  CT abdomen pelvis 3/28/2022 does show diffuse thickening of urinary bladder as has been seen on prior studies indicative of cystitis.  Patient notes that she did stop taking trimethoprim on a daily basis.    Patient with urine culture on 5/5/2023 that grew E. coli and she received antibiotic treatment from urogynecology.    Urine culture on 8/23/2023 with greater than 100,000 colonies of E. coli resistant to ampicillin and Bactrim.  Received 5-day course of Cipro with resolution of symptoms.  Patient did have UTI with E. coli on culture 10/2/2023, received a course of Augmentin.  Patient with ongoing intermittent urinary tract infections.     *Charcot-Saloni-Tooth with mobility difficulties:  The patient underwent an intensive course of rehabilitation at [facility].  She graduated from her outpatient course November 2018.  Overall the patient has improved dramatically in terms of mobility,   Patient developed significant callus formation lateral aspect of the left plantar surface.    Patient developed skin erosion and an ulceration on the lateral aspect of her left foot.    Patient  required surgery on her left foot by Dr. Lopez on 12/10/2024  Patient is continuing to recover from her left foot surgery in December.  She remains in a boot, ambulating currently with a rollator and continuing to participate with physical therapy 2 times per week.  She has a new brace in place on her right lower extremity    *Depression:  The patient is continuing follow-up in the supportive oncology clinic and is currently continuing on Cymbalta 30 mg a.m. and 60 mg p.m., gabapentin 900 mg nightly, Ativan 0.5 mg nightly as needed, Provigil 200 mg daily.  Patient does continue to attend support groups at [employer].  The patient does continue routine follow-up in supportive oncology clinic.    Patient does have multiple stressors with her 's malignancy and chemotherapy as well as her autistic son who is living with them at home.  Patient continues to deal with grief related to her 's death.  She has seen a grief counselor through hospice which helped significantly.  She continues routine follow-up with supportive oncology as well.      *Hand-foot syndrome secondary to Xeloda:  Patient continues with frequent application of emollient cream to the hands and feet  Symptoms increased significantly requiring a 1 week delay in cycle 18 Xeloda as noted above.  Symptoms did improve and she continued on the same dose.    Patient was referred to dermatology and has been continuing on triamcinolone 0.1% cream used for 1 week on followed by 1 week off which led to some further improvement.   Progressive palmar erythema with development of sclerodactyly and effect on dexterity.  Addition of urea-based cream 3 times daily.  Patient with continued difficulty regarding erythema of her hands that extended onto the dorsal aspect and was causing contractures/sclerodactyly in her fingers affecting her dexterity.  In July 2021, held Xeloda for an additional week and then reduced dose from 1500 mg twice daily down  to 1000 mg a.m. and 1500 mg p.m. and continued on a 7-day on followed by 7-day off schedule.  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  On 3/31/2022, Xeloda was held to allow for recovery.  Patient resumed Xeloda on 4/18/2022.  Patient developed worsening sclerodactyly affecting dexterity that required Xeloda to again be briefly held for 2 weeks from 10/3 - 10/17/2022.  Treatment resumed at prior dose after improvement in symptoms.  Patient continues to use emollient cream frequently and topical triamcinolone 0.1% twice daily intermittently (2 weeks on followed by 2 weeks off as instructed by dermatology).  With recent 6-week break in Xeloda, patient's symptoms are overall improved currently    *Evidence of steatosis on scans with mild intermittent elevated liver function studies:  Liver function studies increased 8/20/19 with ALT 98, AST 70, normal total bilirubin.  Negative viral hepatitis A, B, and C panel 8/23/18  Likely related to hepatic steatosis.   Subsequent improvement in LFTs  LFTs today are normal    *Chemotherapy induced leukopenia:  WBC today is normal at 8.72    *GERD, question of celiac disease:  Patient with significant history of reflux  Patient currently receiving Protonix 40 mg daily daily and Carafate 1 g 4 times daily as needed  Patient required hospitalization 5/29 - 6/1/2022 with presumed viral gastroenteritis that was exacerbated by Xeloda.    EGD performed 5/31/2022 during hospitalization with biopsy that showed focal blunting of villi and atrophy with slight increase in intraepithelial lymphocytes.  Changes were minimal but recommended correlation with serology to exclude celiac disease.  Celiac serology 8/8/2022 negative  Patient previously developed  worsening reflux symptoms, temporarily increase Protonix to 40 mg twice daily and we did add Carafate 1 g 4 times daily.    She is taking Protonix 40 mg once daily    *Insomnia:  Patient with prior paradoxical reaction to Benadryl  Improved previously on temazepam 15 mg nightly as needed.   Patient noted subsequently that temazepam was having no effect.   Patient continuing on gabapentin 900 mg nightly    *Health maintenance:  Patient notes that she has a history of colon polyps as well as ulcerative colitis and was due for a follow-up colonoscopy on 9/12/2019.  We did discuss there is no necessity to pursue colonoscopy in the setting of her metastatic breast cancer.    The patient did undergo colonoscopy on 2/7/2020 with findings of muscular hypertrophy and diverticulosis.   Patient did wish to proceed with further colonoscopic evaluation, performed on 12/20/2022 with poor prep.  Repeat 2/28/2023 with diverticulosis.    *Bilateral shoulder pain:  Chronic difficulty with right shoulder although she has not complained of this in prior visits to our office.  She reported difficulty with abduction.    The patient did undergo MRI of her right shoulder on 11/21/2019 at an outside facility showing multiple abnormalities including supraspinatus tendinosis, labral tear but no evidence of metastatic disease.  Patient developed pain in the left shoulder, was seen by orthopedics and underwent steroid injection with some improvement.  Right shoulder stable.  Patient did undergo physical therapy with some improvement.  She continues to use tramadol 50 mg every 8-12 hours as needed.  Note that current CT 10/20/2022 made notation of left shoulder probable bursitis.    Patient continues with bilateral shoulder pain which does wax and wane.  Recently right shoulder pain has been more problematic, exacerbated by using her arms to assist with transfers and ability after foot surgery.  She has been experiencing upper arm pain recently  which is new as well and we are proceeding with repeat scans in the next few weeks as noted above.    *Ocular changes in part related to Xeloda:  Patient experienced a mild degree of blurred vision as well as burning and pruritus.  She was seen by her ophthalmologist and was placed on xiidra ophthalmic drops which have helped.  Likely both issues are to some extent related to Xeloda.  Symptoms did worsen and patient was seen by ophthalmologist at [facility].  She is now using an ocular lubricant at night in addition to artificial tears which have helped.  Symptoms currently stable to improved    *Elevated glucose:  It is noted the patient's glucose at the last few visits has been in the high 100 range, postprandial.  She has had a hemoglobin A1c that has been in the high 5-6 range in the past  Hemoglobin A1c was last checked on 12/3/2024 at 5.8    *Possible loosening of pedicle screw at T3:  CT cervical spine 5/17/2021 showed loosening of the left pedicle screw at T3.  Patient referred to orthopedics and was seen by Dr. Nayak who felt that there were no concerning findings on the scan    *Iron deficiency anemia  Labs on 5/2/2022 with hemoglobin 10.8.  Additional labs with iron 48, ferritin 21.2, iron saturation 11%, TIBC 4 and 32, folate greater than 20, B12 greater than 2000.    Attempted treatment with oral iron daily however patient was intolerant  Patient subsequently received Venofer 300 mg weekly x4 beginning 6/6/2022 and completed on 6/27/2022  Subsequent iron studies on 7/11/2022 showed improvement with iron 62, ferritin 345, iron saturation 18%, TIBC 336.  Hemoglobin had improved up to 12.7 at that time.  Repeat iron studies on 10/3/2022 showed iron replete status with hemoglobin 13.7, iron 121, ferritin 208.7, iron saturation 31%, TIBC 392.  Labs on 4/7/25 with hemoglobin 9.9, iron 17, ferritin 60.7, iron saturation 5%, TIBC 353  Stool cards negative for occult blood x 3 on  4/24/2025  Patient received Injectafer 750 mg IV on 4/14 and 4/21/2024.  Labs today with hemoglobin up to 13.3 and iron replete status with iron 111, ferritin 636, iron saturation 33%, TIBC 334.  With no visible evidence of blood in her stool and negative stool cards for occult blood x 3, I do not feel strongly about the need for endoscopic evaluation however patient will follow-up with GI to discuss this further.     Plan:  Continue palliative single agent Xeloda 1000 mg a.m., 1500 mg in the p.m. 7 days on followed by 7 days off (patient has been on single agent Xeloda since 2/7/2018)  Patient continues on every 3-month Xgeva last administered 4/14/2025 and due next on 7/7/2025  Patient is now iron replete  Continue Eliquis 5 mg twice daily  The patient will continue frequent use of emollient cream currently 5 times daily and continue periodic triamcinolone cream 0.1% twice daily (provided by dermatology) 2 weeks on followed by 2 weeks off as prescribed  Continue Protonix 40 mg daily  Continue ocular lubricating gel nightly per ophthalmology  Continue Provigil 200 mg daily and Cymbalta 30 mg a.m. and 60 mg p.m.  Patient continues routine follow-up with supportive oncology   Patient will continue gabapentin 900 mg at night.   Patient continues on tramadol 50 mg every 8 hours as needed for pain (refill provided today)  Continue Percocet 7.5/325 every 8 hours as needed for pain  Patient continuing to use Imodium as needed for intermittent diarrhea  New prescription sent today for Zofran 8 mg every 8 hours as needed  Patient will schedule a follow-up visit with GI in regards to her recurrent iron deficiency although stool cards were negative for occult blood x 3.  In 2 weeks CT chest abdomen pelvis  In 3 weeks MD visit with CBC, CMP.  We will review scan results.     Patient continuing on high risk medication requiring intensive monitoring.       I did spend 55 minutes in total time caring for the patient today, time  spent in review of records, face-to-face consultation, coordination of care, placement of orders, completion of documentation.

## 2025-05-13 ENCOUNTER — LAB (OUTPATIENT)
Dept: LAB | Facility: HOSPITAL | Age: 65
End: 2025-05-13
Payer: MEDICARE

## 2025-05-13 ENCOUNTER — OFFICE VISIT (OUTPATIENT)
Dept: ONCOLOGY | Facility: CLINIC | Age: 65
End: 2025-05-13
Payer: MEDICARE

## 2025-05-13 VITALS
RESPIRATION RATE: 17 BRPM | HEIGHT: 60 IN | BODY MASS INDEX: 34.37 KG/M2 | OXYGEN SATURATION: 96 % | SYSTOLIC BLOOD PRESSURE: 112 MMHG | DIASTOLIC BLOOD PRESSURE: 75 MMHG | HEART RATE: 92 BPM | TEMPERATURE: 98 F

## 2025-05-13 DIAGNOSIS — D50.9 IRON DEFICIENCY ANEMIA, UNSPECIFIED IRON DEFICIENCY ANEMIA TYPE: Primary | ICD-10-CM

## 2025-05-13 DIAGNOSIS — Z17.1 MALIGNANT NEOPLASM OF OVERLAPPING SITES OF RIGHT BREAST IN FEMALE, ESTROGEN RECEPTOR NEGATIVE: ICD-10-CM

## 2025-05-13 DIAGNOSIS — C50.811 MALIGNANT NEOPLASM OF OVERLAPPING SITES OF RIGHT BREAST IN FEMALE, ESTROGEN RECEPTOR NEGATIVE: ICD-10-CM

## 2025-05-13 LAB
ALBUMIN SERPL-MCNC: 4.4 G/DL (ref 3.5–5.2)
ALBUMIN/GLOB SERPL: 1.8 G/DL
ALP SERPL-CCNC: 82 U/L (ref 39–117)
ALT SERPL W P-5'-P-CCNC: 17 U/L (ref 1–33)
ANION GAP SERPL CALCULATED.3IONS-SCNC: 9.6 MMOL/L (ref 5–15)
AST SERPL-CCNC: 24 U/L (ref 1–32)
BASOPHILS # BLD AUTO: 0.05 10*3/MM3 (ref 0–0.2)
BASOPHILS NFR BLD AUTO: 0.6 % (ref 0–1.5)
BILIRUB SERPL-MCNC: 0.3 MG/DL (ref 0–1.2)
BUN SERPL-MCNC: 20 MG/DL (ref 8–23)
BUN/CREAT SERPL: 21.5 (ref 7–25)
CALCIUM SPEC-SCNC: 9.7 MG/DL (ref 8.6–10.5)
CHLORIDE SERPL-SCNC: 105 MMOL/L (ref 98–107)
CO2 SERPL-SCNC: 27.4 MMOL/L (ref 22–29)
CREAT SERPL-MCNC: 0.93 MG/DL (ref 0.57–1)
DEPRECATED RDW RBC AUTO: 80.7 FL (ref 37–54)
EGFRCR SERPLBLD CKD-EPI 2021: 68.8 ML/MIN/1.73
EOSINOPHIL # BLD AUTO: 0.15 10*3/MM3 (ref 0–0.4)
EOSINOPHIL NFR BLD AUTO: 1.7 % (ref 0.3–6.2)
ERYTHROCYTE [DISTWIDTH] IN BLOOD BY AUTOMATED COUNT: 23 % (ref 12.3–15.4)
FERRITIN SERPL-MCNC: 636 NG/ML (ref 13–150)
GLOBULIN UR ELPH-MCNC: 2.4 GM/DL
GLUCOSE SERPL-MCNC: 126 MG/DL (ref 65–99)
HCT VFR BLD AUTO: 42.3 % (ref 34–46.6)
HGB BLD-MCNC: 13.3 G/DL (ref 12–15.9)
IMM GRANULOCYTES # BLD AUTO: 0.02 10*3/MM3 (ref 0–0.05)
IMM GRANULOCYTES NFR BLD AUTO: 0.2 % (ref 0–0.5)
IRON 24H UR-MRATE: 111 MCG/DL (ref 37–145)
IRON SATN MFR SERPL: 33 % (ref 20–50)
LYMPHOCYTES # BLD AUTO: 1.22 10*3/MM3 (ref 0.7–3.1)
LYMPHOCYTES NFR BLD AUTO: 14 % (ref 19.6–45.3)
MCH RBC QN AUTO: 30.1 PG (ref 26.6–33)
MCHC RBC AUTO-ENTMCNC: 31.4 G/DL (ref 31.5–35.7)
MCV RBC AUTO: 95.7 FL (ref 79–97)
MONOCYTES # BLD AUTO: 0.56 10*3/MM3 (ref 0.1–0.9)
MONOCYTES NFR BLD AUTO: 6.4 % (ref 5–12)
NEUTROPHILS NFR BLD AUTO: 6.72 10*3/MM3 (ref 1.7–7)
NEUTROPHILS NFR BLD AUTO: 77.1 % (ref 42.7–76)
NRBC BLD AUTO-RTO: 0 /100 WBC (ref 0–0.2)
PLATELET # BLD AUTO: 290 10*3/MM3 (ref 140–450)
PMV BLD AUTO: 10.1 FL (ref 6–12)
POTASSIUM SERPL-SCNC: 4 MMOL/L (ref 3.5–5.2)
PROT SERPL-MCNC: 6.8 G/DL (ref 6–8.5)
RBC # BLD AUTO: 4.42 10*6/MM3 (ref 3.77–5.28)
SODIUM SERPL-SCNC: 142 MMOL/L (ref 136–145)
TIBC SERPL-MCNC: 334 MCG/DL (ref 298–536)
TRANSFERRIN SERPL-MCNC: 224 MG/DL (ref 200–360)
WBC NRBC COR # BLD AUTO: 8.72 10*3/MM3 (ref 3.4–10.8)

## 2025-05-13 PROCEDURE — 83540 ASSAY OF IRON: CPT | Performed by: INTERNAL MEDICINE

## 2025-05-13 PROCEDURE — 82728 ASSAY OF FERRITIN: CPT | Performed by: INTERNAL MEDICINE

## 2025-05-13 PROCEDURE — 80053 COMPREHEN METABOLIC PANEL: CPT

## 2025-05-13 PROCEDURE — 84466 ASSAY OF TRANSFERRIN: CPT | Performed by: INTERNAL MEDICINE

## 2025-05-13 PROCEDURE — 85025 COMPLETE CBC W/AUTO DIFF WBC: CPT

## 2025-05-13 PROCEDURE — 36415 COLL VENOUS BLD VENIPUNCTURE: CPT

## 2025-05-13 RX ORDER — ONDANSETRON 8 MG/1
8 TABLET, FILM COATED ORAL EVERY 8 HOURS PRN
Qty: 60 TABLET | Refills: 3 | Status: SHIPPED | OUTPATIENT
Start: 2025-05-13

## 2025-05-13 RX ORDER — TRAMADOL HYDROCHLORIDE 50 MG/1
50 TABLET ORAL EVERY 8 HOURS PRN
Qty: 60 TABLET | Refills: 0 | Status: SHIPPED | OUTPATIENT
Start: 2025-05-13

## 2025-05-13 NOTE — LETTER
May 13, 2025     Jazmine Chanel MD  [address] [id number]    Patient: Martha Davey   YOB: 1960   Date of Visit: 5/13/2025     Dear Jazmine Chanel MD:       Thank you for referring Martha Davey to me for evaluation. Below are the relevant portions of my assessment and plan of care.    If you have questions, please do not hesitate to call me. I look forward to following Martha along with you.         Sincerely,        Ubaldo Hancock MD        CC: No Recipients    Ubaldo Hancock Jr., MD  05/13/25 [id number]  Sign when Signing Visit  Chief Complaint  Previous Stage Ib ([id number]) ER/VT positive, HER-2/peter negative right breast cancer with subsequent metastatic disease identified 10/8/2017, history of right pulmonary embolism, cancer related pain, chemotherapy-induced diarrhea, chemotherapy-induced mucositis, chemotherapy-induced hand-foot syndrome    Subjective       History of Present Illness  The patient returns today in follow-up with laboratory studies, CT scans, bone scan to review continuing on oral Xeloda 1000 mg in the morning, 1500 mg in the evening for 7 days on followed by 7 days off as well as every 3-month Xgeva (last received on 4/14/2025) and Eliquis 5 mg twice daily.  The patient has been maintained on treatment with single agent Xeloda since 2/7/2018.  She does continue with chronic side effects from treatment including hand-foot syndrome and sclerodactyly.  She continues to use emollient cream frequently 4-5 times per day as well as topical triamcinolone intermittently (2 weeks on followed by 2 weeks off as instructed by dermatology).     Since her last visit, the patient did have some time off of chemotherapy.  She reports developing GI symptoms after a course of antibiotics for UTI.  In early March she stopped taking all of her medications due to ongoing diarrhea.  Her symptoms eventually improved and when she was seen back in early April she did resume all of  "her medications including Xeloda.  She reports that she has been doing reasonably well since that time.  Due to the time off chemotherapy, her chronic hand-foot symptoms from Xeloda have improved including her sclerodactyly and limitations on dexterity.  She continues to recover from her foot surgery, still has a left lower extremity boot in place.  She has a new brace in place on her right lower leg.  She continues with physical therapy 2 days/week.  She does report some intermittent mild diarrhea for which she takes Imodium with control of symptoms.  She notes a stable appetite.  She does note ongoing pain in her upper arms and in her right shoulder.  She has had chronic bilateral shoulder issues in the past.  She does feel like the pain in her upper arms is somewhat worse recently and is more of a constant pain.  She last underwent scans including CT chest abdomen pelvis and bone scan on 2/14/2025 which showed stable disease.  She maintains ECOG performance status of 1, ambulating today with use of a rollator.  She reports that her depressive symptoms do wax and wane but overall are under acceptable control.  Patient reports that she is starting today a week off of Xeloda.  She does continue follow-up with supportive oncology, is currently receiving Cymbalta 30 mg a.m., 60 mg p.m. as well as gabapentin 900 mg nightly, Provigil and increased dose recently of 200 mg daily.  She feels that her fatigue has improved on this dose.  She does note some mild nausea, does not have any Zofran available currently.  Patient did have recent iron deficiency and received Injectafer 750 mg IV on 4/14 and again 4/21/2025.  She tolerated this well.  She notes that fatigue has improved since receiving the iron.      Objective  Vital Signs:   /75   Pulse 92   Temp 98 °F (36.7 °C) (Oral)   Resp 17   Ht 152.4 cm (60\")   SpO2 96%   BMI 34.37 kg/m²     Physical Exam  Constitutional:       Appearance: She is well-developed.    "   Comments: Patient ambulating with a rollator   Eyes:      Conjunctiva/sclera: Conjunctivae normal.   Neck:      Thyroid: No thyromegaly.   Cardiovascular:      Rate and Rhythm: Normal rate and regular rhythm.      Heart sounds: No murmur heard.     No friction rub. No gallop.   Pulmonary:      Effort: No respiratory distress.      Breath sounds: Normal breath sounds.   Abdominal:      General: Bowel sounds are normal. There is no distension.      Palpations: Abdomen is soft.      Tenderness: There is no abdominal tenderness.   Musculoskeletal:      Comments: Boot in place left lower extremity.  Brace in place right lower extremity   Lymphadenopathy:      Head:      Right side of head: No submandibular adenopathy.      Cervical: No cervical adenopathy.      Upper Body:      Right upper body: No supraclavicular adenopathy.      Left upper body: No supraclavicular adenopathy.   Skin:     General: Skin is warm and dry.      Findings: Rash present.      Comments: Hand-foot symptoms have improved in the hands bilaterally.  The skin on the palms of the hands is dry and cracked but no significant erythema currently.  Degree of sclerodactyly has decreased with increased mobility of her fingers.   Neurological:      Mental Status: She is alert and oriented to person, place, and time.      Cranial Nerves: No cranial nerve deficit.      Motor: No abnormal muscle tone.      Deep Tendon Reflexes: Reflexes normal.   Psychiatric:         Behavior: Behavior normal.           Result Review: Reviewed CBC, CMP from today.     Assessment and Plan     *Previous Stage Ib ([id number]) right breast cancer:  Diagnosed May 2010 with excisional biopsy for invasive ductal carcinoma, 1.3 cm, grade 2, ER 90%, NC 80%, HER-2/peter negative (1+ IHC).    Subsequent right mastectomy in July 2010 with no residual breast malignancy, 1/5 sentinel lymph node with micrometastasis (0.25 mm).    Treated in the [facility] system with adjuvant AC ×4 cycles in  2010 (no taxanes administered due to underlying Charcot-Saloni-Tooth with peripheral neuropathy).    Adjuvant Femara (postmenopausal) initiated October 2010 with plan to treat ×10 years.    Genetic testing reportedly negative.    Developed osteopenia treated with Prolia beginning 2/27/13. Subsequently discontinued due to identification of metastatic disease.    *Recurrent/metastatic disease identified 10/8/17:  Disease involving thoracic spine with cord compression at T6, lumbosacral involvement, sternal and right sternoclavicular involvement.    Femara discontinued in 10/2017.    Radiation administered (in the [facility] system) to the thoracic spine beginning 10/19/17 treating T3-T9 to a dose of 24 gray in 6 fractions.  Evaluation with MRI 12/8/17 showing persistent T6 cord compression with persistent neurologic compromise requiring surgical treatment 12/11/17 with T6 laminectomy/corpectomy and T3-T9 fusion.  Pathology with metastatic carcinoma of breast origin, ER negative, OR negative, HER-2/peter negative (1+ IHC).  Additional staging evaluation 12/8/17 with no evidence of visceral metastatic disease, bone scan showing involvement of thoracic spine, sternum, left humerus, mid frontal bone.  No plane film correlate of left humerus lesion.  MRI lumbar spine with small intradural L3 metastasis.  CA 15-3 12/6/17- 17.  Palliative radiation therapy to L3 dural metastasis and left humerus initiated 1/15/18 (10 fractions), completed 1/26/18.  Hypercalcemia of malignancy with calcium in the 10-11 range.  Initiation of monthly Xgeva 1/23/18.  Baseline CT scan 1/30/18 with no evidence of visceral involvement.  Cluster of nodular opacities in the right lower lobe suspected to be infectious or related to bronchiolitis. Bone scan 1/30/18 showed postsurgical change in the thoracic spine, stable uptake in the frontal bone, no new areas of disease.  Initiation of palliative oral single agent Xeloda 2/7/18 2000 mg a.m., 1500 mg p.m.  for 14/21 days.   Following 3 cycles xeloda, bone scan 4/4/18 showed no change from the prior study.  CT scan 4/4/18 showed a small pericardial effusion of unclear significance as well as a subcutaneous nodule in the right lateral chest wall.  Subsequent evaluation with echocardiogram 4/17/18 showed no evidence of pericardial effusion.  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  We discussed the possibility of surgical excision of the right subcutaneous chest wall lesion for more definitive diagnosis.  Reviewed previous CT images dating back to 12/8/17 and the lesion was present even at that time measuring around 1.7 cm although not commented on in the radiology report.  As this appears to be an indolent low-grade process unrelated to her breast cancer, recommendee foregoing surgical excision at this time and monitoring this area on future scans.  The patient agreed.    Following 6 cycles of Xeloda, CT 6/6/18  showed stable findings, no evidence of progressive disease.  There was a comment regarding subcutaneous abnormality in the anterior abdominal wall and this was related to Lovenox injection sites.  Bone scan 6/6/18 showed no interval change.   CT scan and bone scan 8/13/18 following 9 cycles of Xeloda showed stable findings with no evidence of significant visceral metastases.  Her bone lesions appear stable on bone scan.  The spindle cell neoplasm in her right chest wall actually decreased in size from 2 cm down to 1.6 cm.    The patient experienced some symptoms of diarrhea, anorexia, generalized weakness during cycle 9 Xeloda it was unclear whether this was related to a viral gastroenteritis or toxicity from treatment.  Symptoms recurred during cycle 10 and treatment was cut short by 2 days.  Symptoms attributed to Xeloda.  With cycle 11, dose and schedule altered  to 1500 mg twice daily for 7 days on followed by 7 days off .  CT scan 9/9/2020 with no significant changes.  Bone scan 9/9/2020 read as unchanged from prior studies however did note an area of slight activity in the medial left femur.  Contacted radiology and although this was not noted on prior reports appears to have been present.  Subsequent MRI left femur 9/21/2020 with cortical thickening and periosteal edema left iliac us muscle insertion to the medial left femur with no evidence of metastatic disease, favored to represent periosteal change secondary to insertional tendinitis.  Severe hand-foot symptoms causing sclerodactyly and limitation in finger movement prompted change in dosing in July 2021 with Xeloda decreased to 1000 mg a.m., 1500 mg p.m. for 7 days on followed by 7 days off.  Patient was experiencing severe hand-foot syndrome related to Xeloda having developed skin peeling, sclerodactyly with limited use of her hands.  On 3/31/2022, Xeloda was held to allow for recovery.  Patient resumed Xeloda on 4/18/2022.  Patient required hospitalization 5/29 - 6/1/2022 with presumed viral gastroenteritis that was exacerbated by Xeloda.  Xeloda held briefly, resumed on 6/6/2022.  Patient again with worsening sclerodactyly, Xeloda held for 2 weeks from 10/3 - 10/17/2022, resumed at prior dose with improvement in symptoms.  Scans on 3/3/2023 with CT chest abdomen pelvis and bone scan showing stable findings.  CT chest abdomen pelvis 7/3/2023 with questionable 1 mm change in size right lower lobe nodule.  Additional small pulmonary nodules stable.  Stable bony metastatic disease.  Bone scan on 7/7/2023 however showed slight progression focal involvement right ischium and superior pubic ramus (new ischial lesion superior pubic ramus compared to 10/24/2022 and more conspicuous compared to 3/3/2023).  Metastatic lesion right inferior pubic ramus and ischial tuberosity increased in size since October 2022 however  stable from 3/3/2023.  MRI cervical spine 7/3/2023 with no metastatic involvement however identification of the lesion at T2, recommended dedicated thoracic spine MRI to evaluate further.  Given the minimal extent and slow degree of progression, elected to continue oral Xeloda and evaluate further with MRI pelvis and thoracic spine.  Consideration of biopsy pelvic metastasis for repeat ER/IA/HER2/peter testing.    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  CA 15-3 on 7/10/2023 was 12.2, uninformative.  MRI thoracic spine 7/27/2023 with 1 cm metastatic focus seen in L1  MRI pelvis 7/27/2023 with metastatic foci identified right superior pubic ramus, right ischial tuberosity, inferior pubic ramus, L5 body.  Chronic appearing posttraumatic and/or degenerative change in the posterior right ilium adjacent SI joint.  CT-guided biopsy right pubic ramus lesion on 8/31/2023.  Pathology showed no evidence of malignancy, showed markedly calcified and sclerotic mature lamellar bone.  Attempted decalcification but no evidence of malignancy.  CT chest abdomen pelvis 9/8/2023 and bone scan 9/11/2023 with stable findings.  CT chest abdomen pelvis 12/8/2023 with possible slight increase in right chest wall subcutaneous lesion (1.8 x 1.1 up to 1.8 x 1.4 cm) although may be related to altered positioning.  Nonpathologically enlarged right axillary and subpectoral lymph nodes slightly increased (patient notes she received RSV vaccine right arm just prior to scan).  Bone scan 12/11/2023 with stable findings.  CT chest abdomen pelvis 3/19/2024 with slight interval decrease in size of right axillary and bilateral subpectoral lymph nodes, right subcutaneous soft tissue nodule slightly smaller, stable pulmonary nodules, stable bone metastases.  Bone scan 3/19/2024 with stable findings.  CT chest abdomen pelvis 6/21/2024 showed left subpectoral lymph node to have increased slightly in  size (0.9 x 0.6 up to 1.3 x 0.8 cm).  Subcutaneous nodule right chest wall decreased from 1.1 x 1.9 down to 1.1 x 1.4 cm.  Scan otherwise stable.  Bone scan 6/21/2024 with stable findings  CT chest abdomen pelvis 9/23/2024 with resolution of left subpectoral lymphadenopathy, additional findings stable.  Bone scan 9/23/2024 with stable findings.  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  She stopped Xeloda 1 week prior to surgery.  She resumed Xeloda on 1/14/2025 at previous dosing schedule  CT chest abdomen pelvis 2/14/2025 showed stable findings and bone, no evidence of visceral disease.  Bone scan with increased activity right L5/S1 disc space felt to be degenerative with unchanged CT appearance of the vertebral bodies.  Postoperative activity noted left midfoot.  Previous sites of disease activity stable.  Patient briefly discontinued Xeloda on her own for approximately 6 weeks from early March 2025 until mid April 2025 for unclear reasons.  Patient subsequently resumed treatment at prior dosing  The patient returns today continuing on treatment with Xeloda 1000 mg a.m., 1500 mg p.m. 7 days on followed by 7 days off.  She is continuing on Xgeva every 3 months, last received on 4/14/25.  She has been on single agent Xeloda since 2/7/2018.  Patient recently had an approximately 6-week period of time off of Xeloda from early March until mid April 2025.  She reports that she had experienced some diarrhea related to antibiotics she was taking for UTI and elected to go off all of her medications at that time.  She was significantly depressed at the time as well.  She subsequently resumed all of her medications in mid April 2025.  She has been tolerating the Xeloda well.  Her hand-foot symptoms have improved overall, sclerodactyly has improved somewhat related to this.  She does note some intermittent diarrhea that is relieved with Imodium.   She does report some worsening pain in her bilateral upper arms.  She also has intermittent pain in the right shoulder which has been chronic in nature.  Unclear to what extent  her arm pain is related to use of her rollator.  We will go ahead and move up repeat scans with CT chest abdomen pelvis and bone scan to be performed in 3 weeks prior to return visit here in 4 weeks to review results.  Patient will next be due for Xgeva on 7/7/2025.    *Right pulmonary embolism:  Diagnosed on CT angiogram 10/21/17 involving small right lower lobe pulmonary artery.  Lower extremity Dopplers negative.  Bilateral lower extremity Dopplers negative again 12/5/17.  Received chronic Lovenox 1 mg/kg twice per day, transitioned to oral Eliquis in February 2019, continuing on Eliquis 5 mg twice daily.  Patient continues on Eliquis 5 mg twice daily    *Cancer related pain:  Previously receiving Duragesic 50 µg patch every 72 hours along with Dilaudid 4 mg as needed for breakthrough pain  The patient's pain improved over time and she was able to discontinue both Duragesic and Dilaudid in the interval.  Patient does take occasional Flexeril at bedtime due to back spasm/pain when she has been more active.  The patient does have some occasional aggravation of her chronic back pain and does use tramadol 50 mg every 8 hours as needed  Patient was receiving tramadol 50 mg every 8-12 hours as needed in addition to hydrocodone 5/325 every 4 hours as needed.  She was also taking OTC NSAIDs.  Patient developed nausea related to hydrocodone and was transitioned to Percocet 5/325 every 4 hours as needed, advised to stop taking NSAIDs with ongoing anticoagulation.  Patient has chronic pain in her shoulders that does wax and wane is unrelated to her malignancy.  She notes that right shoulder pain has been exacerbated following her left foot surgery and she has relied on her arms for transfers and to assist with mobility.  She is now experiencing  increased pain in her upper arms which is fairly new.  She continues on tramadol 50 mg every 8 hours as needed and Percocet 7.5/325 every 4 hours as needed..  She usually requires 2 doses of tramadol per day and 1 dose of Percocet at night.  She is requesting a refill for tramadol today.    *Chemotherapy-induced diarrhea with subsequent C. difficile colitis in the setting of previous ulcerative colitis and then identification of enteropathogenic E. coli:  Patient with reported history of ulcerative colitis, continues on mesalamine.  The patient developed initial diarrhea related to Xeloda at regular dosing.  Symptoms improved on reduced dose Xeloda  Flare of symptoms in October 2018 with apparent finding of C. difficile colitis by GI, treated with course of oral vancomycin with improvement in symptoms.  Patient notes minimal intermittent diarrhea/loose stools on Xeloda requiring occasional dosing of Imodium.    Patient required hospitalization 5/29 - 6/1/2022 with presumed viral gastroenteritis that was exacerbated by Xeloda.  Xeloda held briefly, resumed on 6/6/2022.  Patient's  developed C. difficile colitis and patient was experiencing increase in diarrhea.  Stool studies performed 8/4/22 negative C. difficile however GI PCR was positive for enteropathogenic E. coli.  Given patient's symptoms and immunocompromise status it was elected to treat her with azithromycin 500 mg daily x3 days beginning 8/5/2022  Diarrhea resolved  Patient underwent colonoscopy on 2/28/2023 with findings of diverticulosis  Patient had been experiencing intermittent episodes of diarrhea, relieved with use of Imodium    *Traumatic left tibia/fibular fracture:  Status post ORIF 12/6/17  Specimen was sent for pathologic review, negative for evidence of malignancy    *Hypercalcemia:  Suspect hypercalcemia of malignancy, calcium in  10-11 range previously.  Calcium normalized following initiation of monthly Xgeva on  1/23/18.    *Chemotherapy-induced mucositis:  Patient has not experienced any recent difficulty with mucositis.    *Recurrent UTI, bladder wall thickening on CT:  Patient had an enterococcal UTI on 3/2/18 sensitive to nitrofurantoin and received treatment, unclear how long.  Recurrent UTI 3/20/18 with urine culture growing Klebsiella, initially treated with nitrofurantoin, transitioned to Levaquin.  CT 4/4/18 with diffuse bladder wall thickening with increased nodular thickening at the left base.  Referral to urogynecology Dr. May Johnson.  She was placed on a prophylactic dose of trimethoprim 100 mg daily, bladder wall thickening felt to be related to recent recurrent urinary tract infections.  Patient with urinary symptoms, treated with course of Macrobid at the end of December 2018, urine culture however was negative 12/31/18.  Patient was found to have Klebsiella UTI 7/29/2019 which was successfully treated with Macrobid with complete resolution of symptoms.  CT 8/10/2021 with diffuse bladder wall thickening new from 5/17/2021 (however seen on multiple prior scans).    UTI on 10/18/2021 with E. coli greater than 100,000 colonies that was pansensitive, treated with Macrobid x7 days  With ongoing/recurrent UTIs patient was seen by urogynecology and initiated suppressive therapy with trimethoprim 100 mg daily in December 2021.  She has not experienced any further urinary tract infections.  CT abdomen pelvis 3/28/2022 does show diffuse thickening of urinary bladder as has been seen on prior studies indicative of cystitis.  Patient notes that she did stop taking trimethoprim on a daily basis.    Patient with urine culture on 5/5/2023 that grew E. coli and she received antibiotic treatment from urogynecology.    Urine culture on 8/23/2023 with greater than 100,000 colonies of E. coli resistant to ampicillin and Bactrim.  Received 5-day course of Cipro with resolution of symptoms.  Patient did have UTI with E. coli on  culture 10/2/2023, received a course of Augmentin.  Patient with ongoing intermittent urinary tract infections.     *Charcot-Saloni-Tooth with mobility difficulties:  The patient underwent an intensive course of rehabilitation at [facility].  She graduated from her outpatient course November 2018.  Overall the patient has improved dramatically in terms of mobility,   Patient developed significant callus formation lateral aspect of the left plantar surface.    Patient developed skin erosion and an ulceration on the lateral aspect of her left foot.    Patient required surgery on her left foot by Dr. Lopez on 12/10/2024  Patient is continuing to recover from her left foot surgery in December.  She remains in a boot, ambulating currently with a rollator and continuing to participate with physical therapy 2 times per week.  She has a new brace in place on her right lower extremity    *Depression:  The patient is continuing follow-up in the supportive oncology clinic and is currently continuing on Cymbalta 30 mg a.m. and 60 mg p.m., gabapentin 900 mg nightly, Ativan 0.5 mg nightly as needed, Provigil 200 mg daily.  Patient does continue to attend support groups at [facility].  The patient does continue routine follow-up in supportive oncology clinic.    Patient does have multiple stressors with her 's malignancy and chemotherapy as well as her autistic son who is living with them at home.  Patient continues to deal with grief related to her 's death.  She has seen a grief counselor through hospice which helped significantly.  She continues routine follow-up with supportive oncology as well.      *Hand-foot syndrome secondary to Xeloda:  Patient continues with frequent application of emollient cream to the hands and feet  Symptoms increased significantly requiring a 1 week delay in cycle 18 Xeloda as noted above.  Symptoms did improve and she continued on the same dose.    Patient was referred to  dermatology and has been continuing on triamcinolone 0.1% cream used for 1 week on followed by 1 week off which led to some further improvement.   Progressive palmar erythema with development of sclerodactyly and effect on dexterity.  Addition of urea-based cream 3 times daily.  Patient with continued difficulty regarding erythema of her hands that extended onto the dorsal aspect and was causing contractures/sclerodactyly in her fingers affecting her dexterity.  In July 2021, held Xeloda for an additional week and then reduced dose from 1500 mg twice daily down to 1000 mg a.m. and 1500 mg p.m. and continued on a 7-day on followed by 7-day off schedule.  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  On 3/31/2022, Xeloda was held to allow for recovery.  Patient resumed Xeloda on 4/18/2022.  Patient developed worsening sclerodactyly affecting dexterity that required Xeloda to again be briefly held for 2 weeks from 10/3 - 10/17/2022.  Treatment resumed at prior dose after improvement in symptoms.  Patient continues to use emollient cream frequently and topical triamcinolone 0.1% twice daily intermittently (2 weeks on followed by 2 weeks off as instructed by dermatology).  With recent 6-week break in Xeloda, patient's symptoms are overall improved currently    *Evidence of steatosis on scans with mild intermittent elevated liver function studies:  Liver function studies increased 8/20/19 with ALT 98, AST 70, normal total bilirubin.  Negative viral hepatitis A, B, and C panel 8/23/18  Likely related to hepatic steatosis.   Subsequent improvement in LFTs  LFTs today are normal    *Chemotherapy induced leukopenia:  WBC today is normal at 8.72    *GERD, question of celiac disease:  Patient  with significant history of reflux  Patient currently receiving Protonix 40 mg daily daily and Carafate 1 g 4 times daily as needed  Patient required hospitalization 5/29 - 6/1/2022 with presumed viral gastroenteritis that was exacerbated by Xeloda.    EGD performed 5/31/2022 during hospitalization with biopsy that showed focal blunting of villi and atrophy with slight increase in intraepithelial lymphocytes.  Changes were minimal but recommended correlation with serology to exclude celiac disease.  Celiac serology 8/8/2022 negative  Patient previously developed worsening reflux symptoms, temporarily increase Protonix to 40 mg twice daily and we did add Carafate 1 g 4 times daily.    She is taking Protonix 40 mg once daily    *Insomnia:  Patient with prior paradoxical reaction to Benadryl  Improved previously on temazepam 15 mg nightly as needed.   Patient noted subsequently that temazepam was having no effect.   Patient continuing on gabapentin 900 mg nightly    *Health maintenance:  Patient notes that she has a history of colon polyps as well as ulcerative colitis and was due for a follow-up colonoscopy on 9/12/2019.  We did discuss there is no necessity to pursue colonoscopy in the setting of her metastatic breast cancer.    The patient did undergo colonoscopy on 2/7/2020 with findings of muscular hypertrophy and diverticulosis.   Patient did wish to proceed with further colonoscopic evaluation, performed on 12/20/2022 with poor prep.  Repeat 2/28/2023 with diverticulosis.    *Bilateral shoulder pain:  Chronic difficulty with right shoulder although she has not complained of this in prior visits to our office.  She reported difficulty with abduction.    The patient did undergo MRI of her right shoulder on 11/21/2019 at an outside facility showing multiple abnormalities including supraspinatus tendinosis, labral tear but no evidence of metastatic disease.  Patient developed pain in the left shoulder, was seen by  orthopedics and underwent steroid injection with some improvement.  Right shoulder stable.  Patient did undergo physical therapy with some improvement.  She continues to use tramadol 50 mg every 8-12 hours as needed.  Note that current CT 10/20/2022 made notation of left shoulder probable bursitis.    Patient continues with bilateral shoulder pain which does wax and wane.  Recently right shoulder pain has been more problematic, exacerbated by using her arms to assist with transfers and ability after foot surgery.  She has been experiencing upper arm pain recently which is new as well and we are proceeding with repeat scans in the next few weeks as noted above.    *Ocular changes in part related to Xeloda:  Patient experienced a mild degree of blurred vision as well as burning and pruritus.  She was seen by her ophthalmologist and was placed on xiidra ophthalmic drops which have helped.  Likely both issues are to some extent related to Xeloda.  Symptoms did worsen and patient was seen by ophthalmologist at [facility].  She is now using an ocular lubricant at night in addition to artificial tears which have helped.  Symptoms currently stable to improved    *Elevated glucose:  It is noted the patient's glucose at the last few visits has been in the high 100 range, postprandial.  She has had a hemoglobin A1c that has been in the high 5-6 range in the past  Hemoglobin A1c was last checked on 12/3/2024 at 5.8    *Possible loosening of pedicle screw at T3:  CT cervical spine 5/17/2021 showed loosening of the left pedicle screw at T3.  Patient referred to orthopedics and was seen by Dr. Nayak who felt that there were no concerning findings on the scan    *Iron deficiency anemia  Labs on 5/2/2022 with hemoglobin 10.8.  Additional labs with iron 48, ferritin 21.2, iron saturation 11%, TIBC 4 and 32, folate greater than 20, B12 greater than 2000.    Attempted treatment with oral iron daily however patient was  intolerant  Patient subsequently received Venofer 300 mg weekly x4 beginning 6/6/2022 and completed on 6/27/2022  Subsequent iron studies on 7/11/2022 showed improvement with iron 62, ferritin 345, iron saturation 18%, TIBC 336.  Hemoglobin had improved up to 12.7 at that time.  Repeat iron studies on 10/3/2022 showed iron replete status with hemoglobin 13.7, iron 121, ferritin 208.7, iron saturation 31%, TIBC 392.  Labs on 4/7/25 with hemoglobin 9.9, iron 17, ferritin 60.7, iron saturation 5%, TIBC 353  Stool cards negative for occult blood x 3 on 4/24/2025  Patient received Injectafer 750 mg IV on 4/14 and 4/21/2024.  Labs today with hemoglobin up to 13.3 and iron replete status with iron 111, ferritin 636, iron saturation 33%, TIBC 334.  With no visible evidence of blood in her stool and negative stool cards for occult blood x 3, I do not feel strongly about the need for endoscopic evaluation however patient will follow-up with GI to discuss this further.     Plan:  Continue palliative single agent Xeloda 1000 mg a.m., 1500 mg in the p.m. 7 days on followed by 7 days off (patient has been on single agent Xeloda since 2/7/2018)  Patient continues on every 3-month Xgeva last administered 4/14/2025 and due next on 7/7/2025  Patient is now iron replete  Continue Eliquis 5 mg twice daily  The patient will continue frequent use of emollient cream currently 5 times daily and continue periodic triamcinolone cream 0.1% twice daily (provided by dermatology) 2 weeks on followed by 2 weeks off as prescribed  Continue Protonix 40 mg daily  Continue ocular lubricating gel nightly per ophthalmology  Continue Provigil 200 mg daily and Cymbalta 30 mg a.m. and 60 mg p.m.  Patient continues routine follow-up with supportive oncology   Patient will continue gabapentin 900 mg at night.   Patient continues on tramadol 50 mg every 8 hours as needed for pain (refill provided today)  Continue Percocet 7.5/325 every 8 hours as needed for  pain  Patient continuing to use Imodium as needed for intermittent diarrhea  New prescription sent today for Zofran 8 mg every 8 hours as needed  Patient will schedule a follow-up visit with GI in regards to her recurrent iron deficiency although stool cards were negative for occult blood x 3.  In 2 weeks CT chest abdomen pelvis  In 3 weeks MD visit with CBC, CMP.  We will review scan results.     Patient continuing on high risk medication requiring intensive monitoring.       I did spend 55 minutes in total time caring for the patient today, time spent in review of records, face-to-face consultation, coordination of care, placement of orders, completion of documentation.

## 2025-05-14 ENCOUNTER — SPECIALTY PHARMACY (OUTPATIENT)
Dept: PHARMACY | Facility: HOSPITAL | Age: 65
End: 2025-05-14
Payer: MEDICARE

## 2025-05-14 NOTE — PROGRESS NOTES
Specialty Pharmacy Note: Xeloda (capecitabine)    Martha Davey is a 64 y.o. female with breast cancer was seen 5/13/25 by Dr. Hancock. Per provider dictation, no changes to oral oncology regimen Xeloda (capecitabine).  Labs Review: The CMP and CBC from 5/13/25 have been reviewed. No dose adjustments are needed for the oral specialty medication(s) based on the labs.    Specialty pharmacy will continue to follow patient.    Lubna Gupta, PharmD, BCPS  5/14/2025  08:58 EDT

## 2025-05-29 ENCOUNTER — HOSPITAL ENCOUNTER (OUTPATIENT)
Dept: CT IMAGING | Facility: HOSPITAL | Age: 65
End: 2025-05-29
Payer: MEDICARE

## 2025-05-30 ENCOUNTER — TELEPHONE (OUTPATIENT)
Dept: ONCOLOGY | Facility: CLINIC | Age: 65
End: 2025-05-30
Payer: MEDICARE

## 2025-05-30 ENCOUNTER — TELEPHONE (OUTPATIENT)
Dept: PAIN MEDICINE | Facility: CLINIC | Age: 65
End: 2025-05-30
Payer: MEDICARE

## 2025-05-30 NOTE — TELEPHONE ENCOUNTER
----- Message from Raisa YODER sent at 5/30/2025  2:46 PM EDT -----  Regarding: FW: JULIANNA appt  6/4  Please reschedule patient's scans and appointment with Dr. Hancock after scans result as well, as she missed her scan appointment yesterday.    I have talked to patient, and she is aware you will be calling to make these appointments.    Thank you,  Raisa  ----- Message -----  From: Clarita Stevenson  Sent: 5/30/2025   2:29 PM EDT  To: Fang Hansen RN  Subject: FYI appt  6/4                                    No show for scans if they are scheduled back before appt please have schedule let me know to so I can try and get it read they are behind

## 2025-05-30 NOTE — TELEPHONE ENCOUNTER
Talked to patient to remind her of New Patient appointment on 6/2/25 please arrive 15 mins prior to appointment.

## 2025-05-30 NOTE — TELEPHONE ENCOUNTER
Call to Ms. Davey to let her know that she had missed her scans yesterday, 5/29/25.  Patient states she forgot them.  Let her know a message will be sent to scheduling to get scans rescheduled and move appointment with Dr. Hancock to be after scans results.  Patient verbalized understanding.

## 2025-06-02 ENCOUNTER — OFFICE VISIT (OUTPATIENT)
Dept: PAIN MEDICINE | Facility: CLINIC | Age: 65
End: 2025-06-02
Payer: MEDICARE

## 2025-06-02 VITALS
HEIGHT: 60 IN | OXYGEN SATURATION: 94 % | DIASTOLIC BLOOD PRESSURE: 85 MMHG | SYSTOLIC BLOOD PRESSURE: 150 MMHG | WEIGHT: 171.2 LBS | TEMPERATURE: 97.1 F | HEART RATE: 111 BPM | BODY MASS INDEX: 33.61 KG/M2 | RESPIRATION RATE: 22 BRPM

## 2025-06-02 DIAGNOSIS — Z17.1 MALIGNANT NEOPLASM OF OVERLAPPING SITES OF RIGHT BREAST IN FEMALE, ESTROGEN RECEPTOR NEGATIVE: ICD-10-CM

## 2025-06-02 DIAGNOSIS — M19.012 OSTEOARTHRITIS OF BOTH SHOULDERS, UNSPECIFIED OSTEOARTHRITIS TYPE: ICD-10-CM

## 2025-06-02 DIAGNOSIS — M19.011 OSTEOARTHRITIS OF BOTH SHOULDERS, UNSPECIFIED OSTEOARTHRITIS TYPE: ICD-10-CM

## 2025-06-02 DIAGNOSIS — C50.811 MALIGNANT NEOPLASM OF OVERLAPPING SITES OF RIGHT BREAST IN FEMALE, ESTROGEN RECEPTOR NEGATIVE: ICD-10-CM

## 2025-06-02 DIAGNOSIS — G89.4 CHRONIC PAIN SYNDROME: Primary | ICD-10-CM

## 2025-06-02 PROCEDURE — 3077F SYST BP >= 140 MM HG: CPT | Performed by: ANESTHESIOLOGY

## 2025-06-02 PROCEDURE — 1125F AMNT PAIN NOTED PAIN PRSNT: CPT | Performed by: ANESTHESIOLOGY

## 2025-06-02 PROCEDURE — 1159F MED LIST DOCD IN RCRD: CPT | Performed by: ANESTHESIOLOGY

## 2025-06-02 PROCEDURE — 3079F DIAST BP 80-89 MM HG: CPT | Performed by: ANESTHESIOLOGY

## 2025-06-02 PROCEDURE — 1160F RVW MEDS BY RX/DR IN RCRD: CPT | Performed by: ANESTHESIOLOGY

## 2025-06-02 PROCEDURE — 99204 OFFICE O/P NEW MOD 45 MIN: CPT | Performed by: ANESTHESIOLOGY

## 2025-06-02 PROCEDURE — 80305 DRUG TEST PRSMV DIR OPT OBS: CPT | Performed by: ANESTHESIOLOGY

## 2025-06-02 RX ORDER — TRAMADOL HYDROCHLORIDE 50 MG/1
100 TABLET ORAL EVERY 8 HOURS PRN
Qty: 180 TABLET | Refills: 0 | Status: SHIPPED | OUTPATIENT
Start: 2025-06-02
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
## 2025-06-03 LAB
POC AMPHETAMINES: NEGATIVE
POC BARBITURATES: NEGATIVE
POC BENZODIAZEPHINES: NEGATIVE
POC BUPRENORPHINE: NEGATIVE
POC COCAINE: NEGATIVE
POC METHADONE: NEGATIVE
POC METHAMPHETAMINE SCREEN URINE: NEGATIVE
POC OPIATES: NEGATIVE
POC OXYCODONE: NEGATIVE
POC PHENCYCLIDINE: NEGATIVE
POC PROPOXYPHENE: NEGATIVE
POC THC: NEGATIVE
POC TRICYCLIC ANTIDEPRESSANTS: NEGATIVE

## 2025-06-04 ENCOUNTER — HOSPITAL ENCOUNTER (OUTPATIENT)
Dept: NUCLEAR MEDICINE | Facility: HOSPITAL | Age: 65
Discharge: HOME | End: 2025-06-04
Payer: MEDICARE

## 2025-06-04 ENCOUNTER — HOSPITAL ENCOUNTER (OUTPATIENT)
Dept: CT IMAGING | Facility: HOSPITAL | Age: 65
Discharge: HOME | End: 2025-06-04
Payer: MEDICARE

## 2025-06-04 DIAGNOSIS — C50.811 MALIGNANT NEOPLASM OF OVERLAPPING SITES OF RIGHT BREAST IN FEMALE, ESTROGEN RECEPTOR NEGATIVE: ICD-10-CM

## 2025-06-04 DIAGNOSIS — Z17.1 MALIGNANT NEOPLASM OF OVERLAPPING SITES OF RIGHT BREAST IN FEMALE, ESTROGEN RECEPTOR NEGATIVE: ICD-10-CM

## 2025-06-04 PROCEDURE — 34310000005 TECHNETIUM MEDRONATE KIT: Performed by: INTERNAL MEDICINE

## 2025-06-04 PROCEDURE — 78306 BONE IMAGING WHOLE BODY: CPT

## 2025-06-04 PROCEDURE — A9503 TC99M MEDRONATE: HCPCS | Performed by: INTERNAL MEDICINE

## 2025-06-04 PROCEDURE — 71260 CT THORAX DX C+: CPT

## 2025-06-04 PROCEDURE — 25510000001 IOPAMIDOL 61 % SOLUTION: Performed by: INTERNAL MEDICINE

## 2025-06-04 PROCEDURE — 74177 CT ABD & PELVIS W/CONTRAST: CPT

## 2025-06-04 RX ORDER — TC 99M MEDRONATE 20 MG/10ML
20.3 INJECTION, POWDER, LYOPHILIZED, FOR SOLUTION INTRAVENOUS
Status: COMPLETED | OUTPATIENT
Start: 2025-06-04 | End: 2025-06-04

## 2025-06-04 RX ORDER — IOPAMIDOL 612 MG/ML
100 INJECTION, SOLUTION INTRAVASCULAR
Status: COMPLETED | OUTPATIENT
Start: 2025-06-04 | End: 2025-06-04

## 2025-06-04 RX ADMIN — Medication 20.3 MILLICURIE: at 10:10

## 2025-06-04 RX ADMIN — IOPAMIDOL 100 ML: 612 INJECTION, SOLUTION INTRAVENOUS at 11:18

## 2025-06-06 ENCOUNTER — LAB (OUTPATIENT)
Dept: LAB | Facility: HOSPITAL | Age: 65
End: 2025-06-06
Payer: MEDICARE

## 2025-06-06 ENCOUNTER — OFFICE VISIT (OUTPATIENT)
Dept: ONCOLOGY | Facility: CLINIC | Age: 65
End: 2025-06-06
Payer: MEDICARE

## 2025-06-06 VITALS
RESPIRATION RATE: 17 BRPM | BODY MASS INDEX: 33.67 KG/M2 | DIASTOLIC BLOOD PRESSURE: 66 MMHG | HEART RATE: 102 BPM | HEIGHT: 60 IN | OXYGEN SATURATION: 95 % | WEIGHT: 171.5 LBS | TEMPERATURE: 98.3 F | SYSTOLIC BLOOD PRESSURE: 113 MMHG

## 2025-06-06 DIAGNOSIS — D50.9 IRON DEFICIENCY ANEMIA, UNSPECIFIED IRON DEFICIENCY ANEMIA TYPE: ICD-10-CM

## 2025-06-06 DIAGNOSIS — C50.811 MALIGNANT NEOPLASM OF OVERLAPPING SITES OF RIGHT BREAST IN FEMALE, ESTROGEN RECEPTOR NEGATIVE: ICD-10-CM

## 2025-06-06 DIAGNOSIS — C50.811 MALIGNANT NEOPLASM OF OVERLAPPING SITES OF RIGHT BREAST IN FEMALE, ESTROGEN RECEPTOR NEGATIVE: Primary | ICD-10-CM

## 2025-06-06 DIAGNOSIS — Z17.1 MALIGNANT NEOPLASM OF OVERLAPPING SITES OF RIGHT BREAST IN FEMALE, ESTROGEN RECEPTOR NEGATIVE: ICD-10-CM

## 2025-06-06 DIAGNOSIS — C50.919 PRIMARY MALIGNANT NEOPLASM OF BREAST WITH METASTASIS: ICD-10-CM

## 2025-06-06 DIAGNOSIS — Z17.1 MALIGNANT NEOPLASM OF OVERLAPPING SITES OF RIGHT BREAST IN FEMALE, ESTROGEN RECEPTOR NEGATIVE: Primary | ICD-10-CM

## 2025-06-06 LAB
ALBUMIN SERPL-MCNC: 4.2 G/DL (ref 3.5–5.2)
ALBUMIN/GLOB SERPL: 1.6 G/DL
ALP SERPL-CCNC: 88 U/L (ref 39–117)
ALT SERPL W P-5'-P-CCNC: 18 U/L (ref 1–33)
ANION GAP SERPL CALCULATED.3IONS-SCNC: 15 MMOL/L (ref 5–15)
AST SERPL-CCNC: 25 U/L (ref 1–32)
BASOPHILS # BLD AUTO: 0.04 10*3/MM3 (ref 0–0.2)
BASOPHILS NFR BLD AUTO: 0.4 % (ref 0–1.5)
BILIRUB SERPL-MCNC: 0.4 MG/DL (ref 0–1.2)
BUN SERPL-MCNC: 27.8 MG/DL (ref 8–23)
BUN/CREAT SERPL: 30.2 (ref 7–25)
CALCIUM SPEC-SCNC: 10.1 MG/DL (ref 8.6–10.5)
CHLORIDE SERPL-SCNC: 101 MMOL/L (ref 98–107)
CO2 SERPL-SCNC: 25 MMOL/L (ref 22–29)
CREAT SERPL-MCNC: 0.92 MG/DL (ref 0.57–1)
DEPRECATED RDW RBC AUTO: 76.2 FL (ref 37–54)
EGFRCR SERPLBLD CKD-EPI 2021: 69.7 ML/MIN/1.73
EOSINOPHIL # BLD AUTO: 0.11 10*3/MM3 (ref 0–0.4)
EOSINOPHIL NFR BLD AUTO: 1 % (ref 0.3–6.2)
ERYTHROCYTE [DISTWIDTH] IN BLOOD BY AUTOMATED COUNT: 21.6 % (ref 12.3–15.4)
GLOBULIN UR ELPH-MCNC: 2.6 GM/DL
GLUCOSE SERPL-MCNC: 178 MG/DL (ref 65–99)
HCT VFR BLD AUTO: 43.3 % (ref 34–46.6)
HGB BLD-MCNC: 13.7 G/DL (ref 12–15.9)
IMM GRANULOCYTES # BLD AUTO: 0.02 10*3/MM3 (ref 0–0.05)
IMM GRANULOCYTES NFR BLD AUTO: 0.2 % (ref 0–0.5)
LYMPHOCYTES # BLD AUTO: 1.64 10*3/MM3 (ref 0.7–3.1)
LYMPHOCYTES NFR BLD AUTO: 15 % (ref 19.6–45.3)
MCH RBC QN AUTO: 30.9 PG (ref 26.6–33)
MCHC RBC AUTO-ENTMCNC: 31.6 G/DL (ref 31.5–35.7)
MCV RBC AUTO: 97.7 FL (ref 79–97)
MONOCYTES # BLD AUTO: 0.55 10*3/MM3 (ref 0.1–0.9)
MONOCYTES NFR BLD AUTO: 5 % (ref 5–12)
NEUTROPHILS NFR BLD AUTO: 78.4 % (ref 42.7–76)
NEUTROPHILS NFR BLD AUTO: 8.56 10*3/MM3 (ref 1.7–7)
NRBC BLD AUTO-RTO: 0 /100 WBC (ref 0–0.2)
PLATELET # BLD AUTO: 275 10*3/MM3 (ref 140–450)
PMV BLD AUTO: 10.5 FL (ref 6–12)
POTASSIUM SERPL-SCNC: 4.4 MMOL/L (ref 3.5–5.2)
PROT SERPL-MCNC: 6.8 G/DL (ref 6–8.5)
RBC # BLD AUTO: 4.43 10*6/MM3 (ref 3.77–5.28)
SODIUM SERPL-SCNC: 141 MMOL/L (ref 136–145)
WBC NRBC COR # BLD AUTO: 10.92 10*3/MM3 (ref 3.4–10.8)

## 2025-06-06 PROCEDURE — 85025 COMPLETE CBC W/AUTO DIFF WBC: CPT

## 2025-06-06 PROCEDURE — 80053 COMPREHEN METABOLIC PANEL: CPT

## 2025-06-06 PROCEDURE — 36415 COLL VENOUS BLD VENIPUNCTURE: CPT

## 2025-06-06 NOTE — PROGRESS NOTES
Chief Complaint  Previous Stage Ib ([id number]) ER/IA positive, HER-2/peter negative right breast cancer with subsequent metastatic disease identified 10/8/2017, history of right pulmonary embolism, cancer related pain, chemotherapy-induced diarrhea, chemotherapy-induced mucositis, chemotherapy-induced hand-foot syndrome    Subjective        History of Present Illness  The patient returns today in follow-up with laboratory studies, CT scans, bone scan to review continuing on oral Xeloda 1000 mg in the morning, 1500 mg in the evening for 7 days on followed by 7 days off as well as every 3-month Xgeva (last received on 4/14/2025) and Eliquis 5 mg twice daily.  The patient has been maintained on treatment with single agent Xeloda since 2/7/2018.  She has however had periods of time off of treatment both related to hand-foot syndrome as well as more recently in March 2025 due to diarrhea.  She does continue with chronic side effects from treatment including hand-foot syndrome and sclerodactyly.  She continues to use emollient cream frequently 4-5 times per day as well as topical triamcinolone intermittently (2 weeks on followed by 2 weeks off as instructed by dermatology).     The patient has had iron deficiency as well and received treatment with Injectafer 750 mg IV on 4/14 and 4/21/2025 as she is intolerant of oral iron.    Patient continues follow-up with supportive oncology last seen on 5/27/2025 and is continuing on Cymbalta 30 mg a.m., 60 mg p.m., gabapentin 300 mg in the morning and 600 mg at night as well as Provigil 200 mg daily.    Patient has had considerable difficulty with orthopedic issues.  She has had a lengthy recovery from foot surgery however is now out of her boot and is ambulating with use of a cane.  She is still undergoing physical therapy.  She has now been released to return to driving which has provided her with some independence.  She is having more recent difficulty with chronic bilateral  "shoulder symptoms.  She was seen by orthopedics at [facility] and underwent bilateral shoulder injections recently with very little improvement.  There is anticipation of need for shoulder replacement surgery and there was discussion regarding potential surgery on the left in October 2025.  She was seen by pain management and her tramadol dose was increased from 50 up to 100 mg every 8 hours, does have Percocet 7.5/325 to use if needed.    Patient notes that she will be going on a trip to [place] state with friends in early August.  She notes that her appetite is stable.  She denies any respiratory symptoms.  She does note some occasional loose stool but no overt diarrhea recently.        Objective   Vital Signs:   /66   Pulse 102   Temp 98.3 °F (36.8 °C) (Oral)   Resp 17   Ht 152 cm (59.84\")   Wt 77.8 kg (171 lb 8 oz)   SpO2 95%   BMI 33.67 kg/m²     Physical Exam  Constitutional:       Appearance: She is well-developed.      Comments: Patient ambulating with use of a cane   Eyes:      Conjunctiva/sclera: Conjunctivae normal.   Neck:      Thyroid: No thyromegaly.   Cardiovascular:      Rate and Rhythm: Normal rate and regular rhythm.      Heart sounds: No murmur heard.     No friction rub. No gallop.   Pulmonary:      Effort: No respiratory distress.      Breath sounds: Normal breath sounds.   Abdominal:      General: Bowel sounds are normal. There is no distension.      Palpations: Abdomen is soft.      Tenderness: There is no abdominal tenderness.   Musculoskeletal:      Comments: No edema.  Brace in place left lower extremity   Lymphadenopathy:      Head:      Right side of head: No submandibular adenopathy.      Cervical: No cervical adenopathy.      Upper Body:      Right upper body: No supraclavicular adenopathy.      Left upper body: No supraclavicular adenopathy.   Skin:     General: Skin is warm and dry.      Findings: Rash present.      Comments: Hand-foot symptoms have " improved.  There is some mild erythema and some mild dry, cracked skin.  The degree of sclerodactyly has diminished from prior exams.  Slight patchy erythema in the dorsal aspect of the fingers and hand unchanged   Neurological:      Mental Status: She is alert and oriented to person, place, and time.      Cranial Nerves: No cranial nerve deficit.      Motor: No abnormal muscle tone.      Deep Tendon Reflexes: Reflexes normal.   Psychiatric:         Behavior: Behavior normal.           Result Review : Reviewed CBC, CMP from today.  Reviewed bone scan and CT chest abdomen pelvis from 6/4/2025.     Assessment and Plan     *Previous Stage Ib ([id number]) right breast cancer:  Diagnosed May 2010 with excisional biopsy for invasive ductal carcinoma, 1.3 cm, grade 2, ER 90%, OK 80%, HER-2/peter negative (1+ IHC).    Subsequent right mastectomy in July 2010 with no residual breast malignancy, 1/5 sentinel lymph node with micrometastasis (0.25 mm).    Treated in the [facility] system with adjuvant AC ×4 cycles in 2010 (no taxanes administered due to underlying Charcot-Saloni-Tooth with peripheral neuropathy).    Adjuvant Femara (postmenopausal) initiated October 2010 with plan to treat ×10 years.    Genetic testing reportedly negative.    Developed osteopenia treated with Prolia beginning 2/27/13. Subsequently discontinued due to identification of metastatic disease.    *Recurrent/metastatic disease identified 10/8/17:  Disease involving thoracic spine with cord compression at T6, lumbosacral involvement, sternal and right sternoclavicular involvement.    Femara discontinued in 10/2017.    Radiation administered (in the [facility] system) to the thoracic spine beginning 10/19/17 treating T3-T9 to a dose of 24 gray in 6 fractions.  Evaluation with MRI 12/8/17 showing persistent T6 cord compression with persistent neurologic compromise requiring surgical treatment 12/11/17 with T6 laminectomy/corpectomy and T3-T9 fusion.  Pathology  with metastatic carcinoma of breast origin, ER negative, SD negative, HER-2/peter negative (1+ IHC).  Additional staging evaluation 12/8/17 with no evidence of visceral metastatic disease, bone scan showing involvement of thoracic spine, sternum, left humerus, mid frontal bone.  No plane film correlate of left humerus lesion.  MRI lumbar spine with small intradural L3 metastasis.  CA 15-3 12/6/17- 17.  Palliative radiation therapy to L3 dural metastasis and left humerus initiated 1/15/18 (10 fractions), completed 1/26/18.  Hypercalcemia of malignancy with calcium in the 10-11 range.  Initiation of monthly Xgeva 1/23/18.  Baseline CT scan 1/30/18 with no evidence of visceral involvement.  Cluster of nodular opacities in the right lower lobe suspected to be infectious or related to bronchiolitis. Bone scan 1/30/18 showed postsurgical change in the thoracic spine, stable uptake in the frontal bone, no new areas of disease.  Initiation of palliative oral single agent Xeloda 2/7/18 2000 mg a.m., 1500 mg p.m. for 14/21 days.   Following 3 cycles xeloda, bone scan 4/4/18 showed no change from the prior study.  CT scan 4/4/18 showed a small pericardial effusion of unclear significance as well as a subcutaneous nodule in the right lateral chest wall.  Subsequent evaluation with echocardiogram 4/17/18 showed no evidence of pericardial effusion.  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  We discussed the possibility of surgical excision of the right subcutaneous chest wall lesion for more definitive diagnosis.  Reviewed previous CT images dating back to 12/8/17 and the lesion was present even at that time measuring around 1.7 cm although not commented on in the radiology report.  As this appears to be an indolent low-grade process unrelated to her breast cancer, recommendee foregoing  surgical excision at this time and monitoring this area on future scans.  The patient agreed.    Following 6 cycles of Xeloda, CT 6/6/18  showed stable findings, no evidence of progressive disease.  There was a comment regarding subcutaneous abnormality in the anterior abdominal wall and this was related to Lovenox injection sites.  Bone scan 6/6/18 showed no interval change.   CT scan and bone scan 8/13/18 following 9 cycles of Xeloda showed stable findings with no evidence of significant visceral metastases.  Her bone lesions appear stable on bone scan.  The spindle cell neoplasm in her right chest wall actually decreased in size from 2 cm down to 1.6 cm.    The patient experienced some symptoms of diarrhea, anorexia, generalized weakness during cycle 9 Xeloda it was unclear whether this was related to a viral gastroenteritis or toxicity from treatment.  Symptoms recurred during cycle 10 and treatment was cut short by 2 days.  Symptoms attributed to Xeloda.  With cycle 11, dose and schedule altered to 1500 mg twice daily for 7 days on followed by 7 days off .  CT scan 9/9/2020 with no significant changes.  Bone scan 9/9/2020 read as unchanged from prior studies however did note an area of slight activity in the medial left femur.  Contacted radiology and although this was not noted on prior reports appears to have been present.  Subsequent MRI left femur 9/21/2020 with cortical thickening and periosteal edema left iliac us muscle insertion to the medial left femur with no evidence of metastatic disease, favored to represent periosteal change secondary to insertional tendinitis.  Severe hand-foot symptoms causing sclerodactyly and limitation in finger movement prompted change in dosing in July 2021 with Xeloda decreased to 1000 mg a.m., 1500 mg p.m. for 7 days on followed by 7 days off.  Patient was experiencing severe hand-foot syndrome related to Xeloda having developed skin peeling, sclerodactyly with limited  use of her hands.  On 3/31/2022, Xeloda was held to allow for recovery.  Patient resumed Xeloda on 4/18/2022.  Patient required hospitalization 5/29 - 6/1/2022 with presumed viral gastroenteritis that was exacerbated by Xeloda.  Xeloda held briefly, resumed on 6/6/2022.  Patient again with worsening sclerodactyly, Xeloda held for 2 weeks from 10/3 - 10/17/2022, resumed at prior dose with improvement in symptoms.  Scans on 3/3/2023 with CT chest abdomen pelvis and bone scan showing stable findings.  CT chest abdomen pelvis 7/3/2023 with questionable 1 mm change in size right lower lobe nodule.  Additional small pulmonary nodules stable.  Stable bony metastatic disease.  Bone scan on 7/7/2023 however showed slight progression focal involvement right ischium and superior pubic ramus (new ischial lesion superior pubic ramus compared to 10/24/2022 and more conspicuous compared to 3/3/2023).  Metastatic lesion right inferior pubic ramus and ischial tuberosity increased in size since October 2022 however stable from 3/3/2023.  MRI cervical spine 7/3/2023 with no metastatic involvement however identification of the lesion at T2, recommended dedicated thoracic spine MRI to evaluate further.  Given the minimal extent and slow degree of progression, elected to continue oral Xeloda and evaluate further with MRI pelvis and thoracic spine.  Consideration of biopsy pelvic metastasis for repeat ER/IN/HER2/peter testing.    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  CA 15-3 on 7/10/2023 was 12.2, uninformative.  MRI thoracic spine 7/27/2023 with 1 cm metastatic focus seen in L1  MRI pelvis 7/27/2023 with metastatic foci identified right superior pubic ramus, right ischial tuberosity, inferior pubic ramus, L5 body.  Chronic appearing posttraumatic and/or degenerative change in the posterior right ilium adjacent SI joint.  CT-guided biopsy right pubic ramus lesion on 8/31/2023.   Pathology showed no evidence of malignancy, showed markedly calcified and sclerotic mature lamellar bone.  Attempted decalcification but no evidence of malignancy.  CT chest abdomen pelvis 9/8/2023 and bone scan 9/11/2023 with stable findings.  CT chest abdomen pelvis 12/8/2023 with possible slight increase in right chest wall subcutaneous lesion (1.8 x 1.1 up to 1.8 x 1.4 cm) although may be related to altered positioning.  Nonpathologically enlarged right axillary and subpectoral lymph nodes slightly increased (patient notes she received RSV vaccine right arm just prior to scan).  Bone scan 12/11/2023 with stable findings.  CT chest abdomen pelvis 3/19/2024 with slight interval decrease in size of right axillary and bilateral subpectoral lymph nodes, right subcutaneous soft tissue nodule slightly smaller, stable pulmonary nodules, stable bone metastases.  Bone scan 3/19/2024 with stable findings.  CT chest abdomen pelvis 6/21/2024 showed left subpectoral lymph node to have increased slightly in size (0.9 x 0.6 up to 1.3 x 0.8 cm).  Subcutaneous nodule right chest wall decreased from 1.1 x 1.9 down to 1.1 x 1.4 cm.  Scan otherwise stable.  Bone scan 6/21/2024 with stable findings  CT chest abdomen pelvis 9/23/2024 with resolution of left subpectoral lymphadenopathy, additional findings stable.  Bone scan 9/23/2024 with stable findings.  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  She stopped Xeloda 1 week prior to surgery.  She resumed Xeloda on 1/14/2025 at previous dosing schedule  CT chest abdomen pelvis 2/14/2025 showed stable findings and bone, no evidence of visceral disease.  Bone scan with increased activity right L5/S1 disc space felt to be degenerative with unchanged CT appearance of the vertebral bodies.  Postoperative activity noted left midfoot.  Previous sites of disease activity stable.  Patient briefly discontinued Xeloda  on her own for approximately 6 weeks from early March 2025 until mid April 2025 for unclear reasons.  Patient subsequently resumed treatment at prior dosing  CT chest abdomen pelvis on 6/4/2025 showed stable disease.  Bone scan on 6/4/2025 with stable disease.  The patient returns today continuing on treatment with Xeloda 1000 mg a.m., 1500 mg p.m. 7 days on followed by 7 days off (currently receiving and on week of Xeloda initiated 6/2/2025).  She is continuing on Xgeva every 3 months, last received on 4/14/25 and due next on 7/7/2025.  She has been on single agent Xeloda since 2/7/2018.  Patient did have an approximately 6-week period of time off of Xeloda from early March until mid April 2025, was experiencing diarrhea after a course of antibiotics for UTI and elected to stop all of her medications for this period of time.  She is now back on treatment and tolerating this well.  Her hand-foot symptoms are mild with erythema, mild skin cracking and minimal sclerodactyly.  She is not experiencing any significant diarrhea at this time.  Appetite is normal.  She maintains ECOG performance status of 1.  We reviewed her scans from 6/4/2025 with CT scans and bone scan showing stable disease.  We will continue on the treatment as planned.  She will have NP visit on 7/7/2025 when she is due for Xgeva.  I will see her back in follow-up on 8/18/2025 after she returns for her vacation.  We will discuss timeframe for follow-up scans at a 4 to 6-month interval depending on her clinical status.    *Right pulmonary embolism:  Diagnosed on CT angiogram 10/21/17 involving small right lower lobe pulmonary artery.  Lower extremity Dopplers negative.  Bilateral lower extremity Dopplers negative again 12/5/17.  Received chronic Lovenox 1 mg/kg twice per day, transitioned to oral Eliquis in February 2019, continuing on Eliquis 5 mg twice daily.  Patient continues on Eliquis 5 mg twice daily    *Cancer related pain:  Previously  receiving Duragesic 50 µg patch every 72 hours along with Dilaudid 4 mg as needed for breakthrough pain  The patient's pain improved over time and she was able to discontinue both Duragesic and Dilaudid in the interval.  Patient does take occasional Flexeril at bedtime due to back spasm/pain when she has been more active.  The patient does have some occasional aggravation of her chronic back pain and does use tramadol 50 mg every 8 hours as needed  Patient was receiving tramadol 50 mg every 8-12 hours as needed in addition to hydrocodone 5/325 every 4 hours as needed.  She was also taking OTC NSAIDs.  Patient developed nausea related to hydrocodone and was transitioned to Percocet 5/325 every 4 hours as needed, advised to stop taking NSAIDs with ongoing anticoagulation.  Patient has chronic pain in her shoulders that does wax and wane is unrelated to her malignancy.  She was recently seen by pain management and is continuing on tramadol and increased dose of 100 mg every 8 hours as needed in addition to Percocet 7.5/325 every 4 hours as needed..  She usually requires 2 doses of tramadol per day and 1 dose of Percocet at night.     *Chemotherapy-induced diarrhea with subsequent C. difficile colitis in the setting of ulcerative colitis.  History of C. difficile colitis and gastroenteritis secondary to enteropathogenic E. coli:  Patient with reported history of ulcerative colitis, continues on mesalamine.  The patient developed initial diarrhea related to Xeloda at regular dosing.  Symptoms improved on reduced dose Xeloda  Flare of symptoms in October 2018 with apparent finding of C. difficile colitis by GI, treated with course of oral vancomycin with improvement in symptoms.  Patient notes minimal intermittent diarrhea/loose stools on Xeloda requiring occasional dosing of Imodium.    Patient required hospitalization 5/29 - 6/1/2022 with presumed viral gastroenteritis that was exacerbated by Xeloda.  Xeloda held  briefly, resumed on 6/6/2022.  Patient's  developed C. difficile colitis and patient was experiencing increase in diarrhea.  Stool studies performed 8/4/22 negative C. difficile however GI PCR was positive for enteropathogenic E. coli.  Given patient's symptoms and immunocompromise status it was elected to treat her with azithromycin 500 mg daily x3 days beginning 8/5/2022  Diarrhea resolved  Patient underwent colonoscopy on 2/28/2023 with findings of diverticulosis  Patient has had intermittent episodes of diarrhea, relieved with use of Imodium.  Recently has been fairly normal    *Traumatic left tibia/fibular fracture:  Status post ORIF 12/6/17  Specimen was sent for pathologic review, negative for evidence of malignancy    *Hypercalcemia:  Suspect hypercalcemia of malignancy, calcium in  10-11 range previously.  Calcium normalized following initiation of monthly Xgeva on 1/23/18.    *Chemotherapy-induced mucositis:  Patient has not experienced any recent difficulty with mucositis.    *Recurrent UTI, bladder wall thickening on CT:  Patient had an enterococcal UTI on 3/2/18 sensitive to nitrofurantoin and received treatment, unclear how long.  Recurrent UTI 3/20/18 with urine culture growing Klebsiella, initially treated with nitrofurantoin, transitioned to Levaquin.  CT 4/4/18 with diffuse bladder wall thickening with increased nodular thickening at the left base.  Referral to urogynecology Dr. May Johnson.  She was placed on a prophylactic dose of trimethoprim 100 mg daily, bladder wall thickening felt to be related to recent recurrent urinary tract infections.  Patient with urinary symptoms, treated with course of Macrobid at the end of December 2018, urine culture however was negative 12/31/18.  Patient was found to have Klebsiella UTI 7/29/2019 which was successfully treated with Macrobid with complete resolution of symptoms.  CT 8/10/2021 with diffuse bladder wall thickening new from 5/17/2021 (however  seen on multiple prior scans).    UTI on 10/18/2021 with E. coli greater than 100,000 colonies that was pansensitive, treated with Macrobid x7 days  With ongoing/recurrent UTIs patient was seen by urogynecology and initiated suppressive therapy with trimethoprim 100 mg daily in December 2021.  She has not experienced any further urinary tract infections.  CT abdomen pelvis 3/28/2022 does show diffuse thickening of urinary bladder as has been seen on prior studies indicative of cystitis.  Patient notes that she did stop taking trimethoprim on a daily basis.    Patient with urine culture on 5/5/2023 that grew E. coli and she received antibiotic treatment from urogynecology.    Urine culture on 8/23/2023 with greater than 100,000 colonies of E. coli resistant to ampicillin and Bactrim.  Received 5-day course of Cipro with resolution of symptoms.  Patient did have UTI with E. coli on culture 10/2/2023, received a course of Augmentin.  Patient with ongoing intermittent urinary tract infections.     *Charcot-Saloni-Tooth with mobility difficulties:  The patient underwent an intensive course of [facility] at [facility].  She graduated from her outpatient course November 2018.  Overall the patient has improved dramatically in terms of mobility,   Patient developed significant callus formation lateral aspect of the left plantar surface.    Patient developed skin erosion and an ulceration on the lateral aspect of her left foot.    Patient required surgery on her left foot by Dr. Lopez on 12/10/2024  Patient had prolonged recovery from her left foot surgery.  She is now finally out of her boot and is using a brace on her lower leg.  She is ambulating with a cane.  She has been released to drive.    *Depression:  The patient is continuing follow-up in the supportive oncology clinic and is currently continuing on Cymbalta 30 mg a.m. and 60 mg p.m., gabapentin 300 mg a.m. and 600 mg nightly, Ativan 0.5 mg nightly as needed,  Provigil 200 mg daily.  Patient does continue to attend support groups at [facility].  The patient does continue routine follow-up in supportive oncology clinic.    Patient does have multiple stressors with her 's malignancy and chemotherapy as well as her autistic son who is living with them at home.  Patient continues to deal with grief related to her 's death.  She has seen a grief counselor through hospice which helped significantly.  She continues routine follow-up with supportive oncology as well.      *Hand-foot syndrome secondary to Xeloda:  Patient continues with frequent application of emollient cream to the hands and feet  Symptoms increased significantly requiring a 1 week delay in cycle 18 Xeloda as noted above.  Symptoms did improve and she continued on the same dose.    Patient was referred to dermatology and has been continuing on triamcinolone 0.1% cream used for 1 week on followed by 1 week off which led to some further improvement.   Progressive palmar erythema with development of sclerodactyly and effect on dexterity.  Addition of urea-based cream 3 times daily.  Patient with continued difficulty regarding erythema of her hands that extended onto the dorsal aspect and was causing contractures/sclerodactyly in her fingers affecting her dexterity.  In July 2021, held Xeloda for an additional week and then reduced dose from 1500 mg twice daily down to 1000 mg a.m. and 1500 mg p.m. and continued on a 7-day on followed by 7-day off schedule.  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  On 3/31/2022, Xeloda was held to allow for recovery.  Patient resumed Xeloda on 4/18/2022.  Patient developed worsening sclerodactyly affecting dexterity that required  Xeloda to again be briefly held for 2 weeks from 10/3 - 10/17/2022.  Treatment resumed at prior dose after improvement in symptoms.  Patient continues to use emollient cream frequently and topical triamcinolone 0.1% twice daily intermittently (2 weeks on followed by 2 weeks off as instructed by dermatology).  Symptoms currently are mild and manageable.  Degree of sclerodactyly did improved with recent 6-week break from Xeloda.    *Evidence of steatosis on scans with mild intermittent elevated liver function studies:  Liver function studies increased 8/20/19 with ALT 98, AST 70, normal total bilirubin.  Negative viral hepatitis A, B, and C panel 8/23/18  Likely related to hepatic steatosis.   Subsequent improvement in LFTs  LFTs today are normal    *Chemotherapy induced leukopenia:  WBC today is 4 elevated at 0.92.  This is likely related to recent bilateral shoulder steroid injections    *GERD, question of celiac disease:  Patient with significant history of reflux  Patient currently receiving Protonix 40 mg daily daily and Carafate 1 g 4 times daily as needed  Patient required hospitalization 5/29 - 6/1/2022 with presumed viral gastroenteritis that was exacerbated by Xeloda.    EGD performed 5/31/2022 during hospitalization with biopsy that showed focal blunting of villi and atrophy with slight increase in intraepithelial lymphocytes.  Changes were minimal but recommended correlation with serology to exclude celiac disease.  Celiac serology 8/8/2022 negative  Patient previously developed worsening reflux symptoms, temporarily increase Protonix to 40 mg twice daily and we did add Carafate 1 g 4 times daily.    She is taking Protonix 40 mg once daily    *Insomnia:  Patient with prior paradoxical reaction to Benadryl  Improved previously on temazepam 15 mg nightly as needed.   Patient noted subsequently that temazepam was having no effect.   Patient continuing on gabapentin 600 mg nightly    *Health  maintenance:  Patient notes that she has a history of colon polyps as well as ulcerative colitis and was due for a follow-up colonoscopy on 9/12/2019.  We did discuss there is no necessity to pursue colonoscopy in the setting of her metastatic breast cancer.    The patient did undergo colonoscopy on 2/7/2020 with findings of muscular hypertrophy and diverticulosis.   Patient did wish to proceed with further colonoscopic evaluation, performed on 12/20/2022 with poor prep.  Repeat 2/28/2023 with diverticulosis.    *Bilateral shoulder pain:  Chronic difficulty with right shoulder although she has not complained of this in prior visits to our office.  She reported difficulty with abduction.    The patient did undergo MRI of her right shoulder on 11/21/2019 at an outside facility showing multiple abnormalities including supraspinatus tendinosis, labral tear but no evidence of metastatic disease.  Patient developed pain in the left shoulder, was seen by orthopedics and underwent steroid injection with some improvement.  Right shoulder stable.  Patient did undergo physical therapy with some improvement.  She continues to use tramadol 50 mg every 8-12 hours as needed.  Note that current CT 10/20/2022 made notation of left shoulder probable bursitis.    Patient continues with bilateral shoulder pain which does wax and wane.  She recently underwent bilateral shoulder injections.  She notes that it was recommended for her to undergo bilateral shoulder replacement surgeries.  There is consideration of potential shoulder replacement surgery on the left in October.    *Ocular changes in part related to Xeloda:  Patient experienced a mild degree of blurred vision as well as burning and pruritus.  She was seen by her ophthalmologist and was placed on xiidra ophthalmic drops which have helped.  Likely both issues are to some extent related to Xeloda.  Symptoms did worsen and patient was seen by ophthalmologist at [facility]  [place].  She is now using an ocular lubricant at night in addition to artificial tears which have helped.  Symptoms currently stable to improved    *Elevated glucose:  It is noted the patient's glucose at the last few visits has been in the high 100 range, postprandial.  She has had a hemoglobin A1c that has been in the high 5-6 range in the past  Hemoglobin A1c was last checked on 12/3/2024 at 5.8    *Possible loosening of pedicle screw at T3:  CT cervical spine 5/17/2021 showed loosening of the left pedicle screw at T3.  Patient referred to orthopedics and was seen by Dr. Nayak who felt that there were no concerning findings on the scan    *Iron deficiency anemia  Labs on 5/2/2022 with hemoglobin 10.8.  Additional labs with iron 48, ferritin 21.2, iron saturation 11%, TIBC 4 and 32, folate greater than 20, B12 greater than 2000.    Attempted treatment with oral iron daily however patient was intolerant  Patient subsequently received Venofer 300 mg weekly x4 beginning 6/6/2022 and completed on 6/27/2022  Subsequent iron studies on 7/11/2022 showed improvement with iron 62, ferritin 345, iron saturation 18%, TIBC 336.  Hemoglobin had improved up to 12.7 at that time.  Repeat iron studies on 10/3/2022 showed iron replete status with hemoglobin 13.7, iron 121, ferritin 208.7, iron saturation 31%, TIBC 392.  Labs on 4/7/25 with hemoglobin 9.9, iron 17, ferritin 60.7, iron saturation 5%, TIBC 353  Stool cards negative for occult blood x 3 on 4/24/2025  Patient received Injectafer 750 mg IV on 4/14 and 4/21/2024.  Labs on 5/13/2025 with hemoglobin up to 13.3 and iron replete status with iron 111, ferritin 636, iron saturation 33%, TIBC 334.    Hemoglobin today remains normal at 13.7.  Recheck iron studies when patient returns for MD visit on 8/18/2025    *Ascending thoracic aortic aneurysm  CT scan 6/4/25 with notation of stable 4 cm enlargement of ascending thoracic aorta  This will be monitored on serial  scans      Plan:  Continue palliative single agent Xeloda 1000 mg a.m., 1500 mg in the p.m. 7 days on followed by 7 days off (patient has been on single agent Xeloda since 2/7/2018).  Patient initiated current week on treatment on 6/2/2025  Patient continues on every 3-month Xgeva last administered 4/14/2025 and due next on 7/7/2025  Continue Eliquis 5 mg twice daily  The patient will continue frequent use of emollient cream currently 5 times daily and continue periodic triamcinolone cream 0.1% twice daily (provided by dermatology) 2 weeks on followed by 2 weeks off as prescribed  Continue Protonix 40 mg daily  Continue ocular lubricating gel nightly per ophthalmology  Continue Provigil 200 mg daily and Cymbalta 30 mg a.m. and 60 mg p.m.  Patient continues routine follow-up with supportive oncology   Patient will continue gabapentin 300 mg a.m. and 600 mg nightly  Patient continues on tramadol 100 mg every 8 hours as needed for pain (dose increased by pain management)  Continue Percocet 7.5/325 every 8 hours as needed for pain  Patient continuing to use Imodium as needed for intermittent diarrhea  Continue Zofran 8 mg every 8 hours as needed for nausea  On 7/7/2025 NP visit with CBC, CMP, magnesium, phosphorus and patient will be due for 3-month dose of Xgeva  MD visit on 8/18/2025 (when patient returns from vacation) with CBC, CMP, stat iron panel/ferritin.  We will discuss timeframe for further follow-up scans possibly at a 4 to 6-month interval from current studies with ongoing clinical stability     Patient continuing on high risk medication requiring intensive monitoring.       I did spend 40 minutes in total time caring for the patient today, time spent in review of records, face-to-face consultation, coordination of care, placement of orders, completion of documentation.

## 2025-06-09 ENCOUNTER — SPECIALTY PHARMACY (OUTPATIENT)
Dept: PHARMACY | Facility: HOSPITAL | Age: 65
End: 2025-06-09
Payer: MEDICARE

## 2025-06-09 NOTE — PROGRESS NOTES
Specialty Pharmacy Note: Xeloda (capecitabine)    Martha Davey is a 64 y.o. female with breast cancer was seen 6/6/25 by Dr. Hancock. Per provider dictation, no changes to oral oncology regimen Xeloda (capecitabine).  Labs Review: The CMP and CBC from 6/6/25 have been reviewed. No dose adjustments are needed for the oral specialty medication(s) based on the labs.    Specialty pharmacy will continue to follow patient.    Lubna Gupta, PharmD, BCPS  6/9/2025  09:24 EDT

## 2025-06-12 ENCOUNTER — SPECIALTY PHARMACY (OUTPATIENT)
Dept: PHARMACY | Facility: HOSPITAL | Age: 65
End: 2025-06-12
Payer: MEDICARE

## 2025-06-12 RX ORDER — CAPECITABINE 500 MG/1
TABLET, FILM COATED ORAL
Qty: 70 TABLET | Refills: 5 | Status: SHIPPED | OUTPATIENT
Start: 2025-06-12

## 2025-06-12 NOTE — TELEPHONE ENCOUNTER
Specialty Pharmacy Patient Management Program  Per Protocol Prescription Order or Refill     Patient will be filling or currently fills medications at [facility] and is enrolled in the Patient Management Program.    Requested Prescriptions     Pending Prescriptions Disp Refills    capecitabine (XELODA) 500 MG chemo tablet [Pharmacy Med Name: CAPECITABINE 500MG TABS] 70 tablet 5     Sig: TAKE TWO TABLETS (1000MG) BY MOUTH EVERY MORNING AND THREE TABLETS (1500MG) EVERY EVENING FOR 7 DAYS, THEN TAKE 7 DAYS OFF. REPEAT CYCLE EVERY 14 DAYS     Prescription orders above were sent to the pharmacy per Collaborative Care Agreement Protocol.     Lubna Gupta, PharmD, [facility]  Clinical Specialty Pharmacist, Oncology  6/12/2025  08:37 EDT

## 2025-06-12 NOTE — TELEPHONE ENCOUNTER
Specialty Pharmacy Patient Management Program  Per Protocol Prescription Order or Refill       Requested Prescriptions     Signed Prescriptions Disp Refills    capecitabine (XELODA) 500 MG chemo tablet 70 tablet 5     Sig: TAKE TWO TABLETS (1000MG) BY MOUTH EVERY MORNING AND THREE TABLETS (1500MG) EVERY EVENING FOR 7 DAYS, THEN TAKE 7 DAYS OFF. REPEAT CYCLE EVERY 14 DAYS     Authorizing Provider: SOHAM RICHARDS JR     Ordering User: LJ ENNIS     Prescription orders above were sent to [facility] per Collaborative Care Agreement Protocol.     Completed independent double check on medication order/RX.    Amanda Christian Rph, BCOP  Clinical Specialty Pharmacist, Oncology  6/12/2025  08:48 EDT

## 2025-06-17 ENCOUNTER — OFFICE VISIT (OUTPATIENT)
Dept: INTERNAL MEDICINE | Facility: CLINIC | Age: 65
End: 2025-06-17
Payer: MEDICARE

## 2025-06-17 VITALS
DIASTOLIC BLOOD PRESSURE: 84 MMHG | HEART RATE: 100 BPM | BODY MASS INDEX: 34.16 KG/M2 | OXYGEN SATURATION: 95 % | SYSTOLIC BLOOD PRESSURE: 126 MMHG | HEIGHT: 60 IN | WEIGHT: 174 LBS

## 2025-06-17 DIAGNOSIS — C50.011 MALIGNANT NEOPLASM INVOLVING BOTH NIPPLE AND AREOLA OF RIGHT BREAST IN FEMALE, UNSPECIFIED ESTROGEN RECEPTOR STATUS: ICD-10-CM

## 2025-06-17 DIAGNOSIS — E55.9 VITAMIN D DEFICIENCY: ICD-10-CM

## 2025-06-17 DIAGNOSIS — I10 HYPERTENSION, UNSPECIFIED TYPE: ICD-10-CM

## 2025-06-17 DIAGNOSIS — R73.01 IFG (IMPAIRED FASTING GLUCOSE): Primary | ICD-10-CM

## 2025-06-17 PROCEDURE — 1125F AMNT PAIN NOTED PAIN PRSNT: CPT | Performed by: INTERNAL MEDICINE

## 2025-06-17 PROCEDURE — 3074F SYST BP LT 130 MM HG: CPT | Performed by: INTERNAL MEDICINE

## 2025-06-17 PROCEDURE — G2211 COMPLEX E/M VISIT ADD ON: HCPCS | Performed by: INTERNAL MEDICINE

## 2025-06-17 PROCEDURE — 1160F RVW MEDS BY RX/DR IN RCRD: CPT | Performed by: INTERNAL MEDICINE

## 2025-06-17 PROCEDURE — 1159F MED LIST DOCD IN RCRD: CPT | Performed by: INTERNAL MEDICINE

## 2025-06-17 PROCEDURE — 99214 OFFICE O/P EST MOD 30 MIN: CPT | Performed by: INTERNAL MEDICINE

## 2025-06-17 PROCEDURE — 3079F DIAST BP 80-89 MM HG: CPT | Performed by: INTERNAL MEDICINE

## 2025-06-17 RX ORDER — LOSARTAN POTASSIUM 25 MG/1
25 TABLET ORAL DAILY
Qty: 90 TABLET | Refills: 3 | Status: SHIPPED | OUTPATIENT
Start: 2025-06-17

## 2025-06-17 RX ORDER — ROSUVASTATIN CALCIUM 5 MG/1
5 TABLET, COATED ORAL DAILY
Qty: 90 TABLET | Refills: 3 | Status: SHIPPED | OUTPATIENT
Start: 2025-06-17

## 2025-06-17 NOTE — PROGRESS NOTES
Chief Complaint   Patient presents with    Hypertension    hyperglycemia    s/p ankle fusion       History of Present Illness   Martha Davey is a 64 y.o. female presents for f/u evaluation. Patient is s/p triple arthrodesis w/ posterior tibial tendon transfer and achilles tendon lengthening in December. She did go to 2 rehab centers after the procedure. She has charcot ambika tooth. Also had new braces made. She has now completed rehab.   Mood is healthy w cymbalta.   On cpap and modafinil for fely.   Has breast ca and remains on med for this w good tolerance.   Ifg on labs. She is on  The following portions of the patient's history were reviewed and updated as appropriate: allergies, current medications, past family history, past medical history, past social history, past surgical history and problem list.  Current Outpatient Medications on File Prior to Visit   Medication Sig Dispense Refill    apixaban (Eliquis) 5 MG tablet tablet TAKE 1 TABLET BY MOUTH EVERY 12  HOURS 180 tablet 3    azelastine (ASTELIN) 0.1 % nasal spray 2 sprays into the nostril(s) as directed by provider 2 (Two) Times a Day. Use in each nostril as directed 30 mL 12    calcium carbonate (OS-CARY) 600 MG tablet Take 1 tablet by mouth 2 (Two) Times a Day With Meals.      capecitabine (XELODA) 500 MG chemo tablet TAKE TWO TABLETS (1000MG) BY MOUTH EVERY MORNING AND THREE TABLETS (1500MG) EVERY EVENING FOR 7 DAYS, THEN TAKE 7 DAYS OFF. REPEAT CYCLE EVERY 14 DAYS 70 tablet 5    clonazePAM (KlonoPIN) 0.5 MG tablet Take 1 tablet by mouth At Night As Needed for Anxiety. 15 tablet 0    CRANBERRY PO Take  by mouth.      Cyanocobalamin ER 1000 MCG tablet controlled-release Take 1 tablet by mouth Daily.      DULoxetine (Cymbalta) 30 MG capsule Take 1 capsule by mouth Daily. Take in conjunction with 60 mg q evening. 90 capsule 0    DULoxetine (Cymbalta) 60 MG capsule Take 1 capsule by mouth Daily. 90 capsule 0    FLONASE ALLERGY RELIEF 50 MCG/ACT nasal  spray Administer 2 sprays into the nostril(s) as directed by provider Daily.  11    gabapentin (NEURONTIN) 300 MG capsule Take 1 capsule by mouth 3 (Three) Times a Day. (Patient taking differently: Take 1 capsule by mouth 3 (Three) Times a Day. Takes 1 tab during daytime and 2 at bedtime.) 270 capsule 1    losartan (Cozaar) 25 MG tablet Take 1 tablet by mouth Daily. 90 tablet 3    mesalamine (LIALDA) 1.2 g EC tablet TAKE 1 TABLET BY MOUTH TWICE  DAILY 180 tablet 3    metaxalone (Skelaxin) 800 MG tablet Take 1 tablet by mouth 3 (Three) Times a Day As Needed for Muscle Spasms. 30 tablet 0    methenamine (HIPREX) 1 g tablet Take 1 tablet by mouth Daily.      modafinil (PROVIGIL) 200 MG tablet Take 1 tablet by mouth Daily. 30 tablet 2    Multiple Vitamins-Minerals (Multivitamin Gummies Womens) chewable tablet Chew 1 tablet Daily.      mupirocin (BACTROBAN) 2 % ointment Apply 1 Application topically to the appropriate area as directed As Needed.      ondansetron (ZOFRAN) 8 MG tablet Take 1 tablet by mouth Every 8 (Eight) Hours As Needed for Nausea or Vomiting for up to 60 doses. 60 tablet 3    pantoprazole (PROTONIX) 40 MG EC tablet TAKE 1 TABLET BY MOUTH DAILY 90 tablet 3    phenazopyridine (Pyridium) 200 MG tablet Take 1 tablet by mouth 3 (Three) Times a Day As Needed for Bladder Spasms. 15 tablet 3    Probiotic Product (PROBIOTIC-10 PO) Take  by mouth.      rosuvastatin (CRESTOR) 5 MG tablet TAKE 1 TABLET BY MOUTH DAILY 90 tablet 3    saccharomyces boulardii (Florastor) 250 MG capsule Take 1 capsule by mouth 2 (Two) Times a Day. 20 capsule 0    traMADol (ULTRAM) 50 MG tablet Take 2 tablets by mouth Every 8 (Eight) Hours As Needed for Severe Pain. 180 tablet 0    triamcinolone (KENALOG) 0.1 % cream Apply  topically to the appropriate area as directed 2 (Two) Times a Day. 15 g 0    urea (CARMOL) 10 % cream Apply  topically to the appropriate area as directed 3 (Three) Times a Day. 453 g 1    Vibegron (Gemtesa) 75 MG  tablet Take 1 tablet by mouth Daily. 30 tablet 5    White Petrolatum-Mineral Oil (Wh Petrol-Mineral Oil-Lanolin) 0.1-0.1 % ointment Apply  to eye(s) as directed by provider.      cholecalciferol (VITAMIN D3) 1000 units tablet Take 1 tablet by mouth Daily.      Diclofenac Sodium (VOLTAREN) 1 % gel gel Place 4 g on the skin as directed by provider.      Hylan (SYNVISC) 16 MG/2ML solution prefilled syringe injection Inject 2 mL into the appropriate joint as directed by provider As Needed. (Patient not taking: Reported on 6/17/2025)       No current facility-administered medications on file prior to visit.     Review of Systems   Constitutional: Negative.    HENT: Negative.     Eyes: Negative.    Respiratory: Negative.     Cardiovascular: Negative.    Gastrointestinal: Negative.    Endocrine: Negative.    Genitourinary: Negative.    Musculoskeletal: Negative.    Skin: Negative.    Allergic/Immunologic: Negative.    Neurological: Negative.    Hematological: Negative.    Psychiatric/Behavioral: Negative.         Objective   Physical Exam  Vitals and nursing note reviewed.   Constitutional:       Appearance: Normal appearance. She is well-developed.   HENT:      Head: Normocephalic and atraumatic.      Right Ear: Tympanic membrane and external ear normal.      Left Ear: Tympanic membrane and external ear normal.      Nose: Nose normal.      Mouth/Throat:      Mouth: Mucous membranes are moist.   Eyes:      Extraocular Movements: Extraocular movements intact.      Pupils: Pupils are equal, round, and reactive to light.   Cardiovascular:      Rate and Rhythm: Normal rate and regular rhythm.      Pulses: Normal pulses.      Heart sounds: Normal heart sounds.   Pulmonary:      Effort: Pulmonary effort is normal. No respiratory distress.      Breath sounds: Normal breath sounds.   Abdominal:      General: Abdomen is flat.      Palpations: Abdomen is soft.   Musculoskeletal:      Cervical back: Normal range of motion and neck  "supple.      Comments: Improved mobility w brace in place.    Skin:     General: Skin is warm and dry.   Neurological:      General: No focal deficit present.      Mental Status: She is alert and oriented to person, place, and time.   Psychiatric:         Mood and Affect: Mood normal.         Behavior: Behavior normal.         Thought Content: Thought content normal.          /84   Pulse 100   Ht 152.4 cm (60\")   Wt 78.9 kg (174 lb)   LMP  (LMP Unknown)   SpO2 95%   BMI 33.98 kg/m²     Assessment & Plan   Diagnoses and all orders for this visit:    IFG (impaired fasting glucose)  -     Basic Metabolic Panel  -     CBC & Differential  -     TSH  -     Vitamin D,25-Hydroxy  -     Hemoglobin A1c    Hypertension, unspecified type  -     Basic Metabolic Panel  -     CBC & Differential  -     TSH  -     Vitamin D,25-Hydroxy  -     Hemoglobin A1c    Vitamin D deficiency  -     Vitamin D,25-Hydroxy    Malignant neoplasm involving both nipple and areola of right breast in female, unspecified estrogen receptor status  -     Basic Metabolic Panel  -     CBC & Differential  -     TSH  -     Vitamin D,25-Hydroxy  -     Hemoglobin A1c      Patient w h/o breast cancer, hypertension, elevated glucose. Will test A1c. If appropriate will start med. She is given lit on carb tracking. To get ap for this. She will have listed labs tested. She is to track bp at home and continue current meds. She will continue rehab on ankle. To start chair aerobics. Continue light walking.            "

## 2025-06-18 LAB
25(OH)D3+25(OH)D2 SERPL-MCNC: 43 NG/ML (ref 30–100)
BASOPHILS # BLD AUTO: 0 X10E3/UL (ref 0–0.2)
BASOPHILS NFR BLD AUTO: 0 %
BUN SERPL-MCNC: 23 MG/DL (ref 8–27)
BUN/CREAT SERPL: 24 (ref 12–28)
CALCIUM SERPL-MCNC: 9.6 MG/DL (ref 8.7–10.3)
CHLORIDE SERPL-SCNC: 100 MMOL/L (ref 96–106)
CO2 SERPL-SCNC: 25 MMOL/L (ref 20–29)
CREAT SERPL-MCNC: 0.97 MG/DL (ref 0.57–1)
EGFRCR SERPLBLD CKD-EPI 2021: 65 ML/MIN/1.73
EOSINOPHIL # BLD AUTO: 0.1 X10E3/UL (ref 0–0.4)
EOSINOPHIL NFR BLD AUTO: 2 %
ERYTHROCYTE [DISTWIDTH] IN BLOOD BY AUTOMATED COUNT: 20.4 % (ref 11.7–15.4)
GLUCOSE SERPL-MCNC: 84 MG/DL (ref 70–99)
HBA1C MFR BLD: 5.9 % (ref 4.8–5.6)
HCT VFR BLD AUTO: 40.3 % (ref 34–46.6)
HGB BLD-MCNC: 13 G/DL (ref 11.1–15.9)
IMM GRANULOCYTES # BLD AUTO: 0 X10E3/UL (ref 0–0.1)
IMM GRANULOCYTES NFR BLD AUTO: 0 %
LYMPHOCYTES # BLD AUTO: 1.2 X10E3/UL (ref 0.7–3.1)
LYMPHOCYTES NFR BLD AUTO: 14 %
MCH RBC QN AUTO: 31.9 PG (ref 26.6–33)
MCHC RBC AUTO-ENTMCNC: 32.3 G/DL (ref 31.5–35.7)
MCV RBC AUTO: 99 FL (ref 79–97)
MONOCYTES # BLD AUTO: 0.8 X10E3/UL (ref 0.1–0.9)
MONOCYTES NFR BLD AUTO: 9 %
NEUTROPHILS # BLD AUTO: 6.6 X10E3/UL (ref 1.4–7)
NEUTROPHILS NFR BLD AUTO: 75 %
PLATELET # BLD AUTO: 233 X10E3/UL (ref 150–450)
POTASSIUM SERPL-SCNC: 4.4 MMOL/L (ref 3.5–5.2)
RBC # BLD AUTO: 4.08 X10E6/UL (ref 3.77–5.28)
SODIUM SERPL-SCNC: 141 MMOL/L (ref 134–144)
TSH SERPL DL<=0.005 MIU/L-ACNC: 6.95 UIU/ML (ref 0.45–4.5)
WBC # BLD AUTO: 8.9 X10E3/UL (ref 3.4–10.8)

## 2025-06-19 ENCOUNTER — RESULTS FOLLOW-UP (OUTPATIENT)
Dept: INTERNAL MEDICINE | Facility: CLINIC | Age: 65
End: 2025-06-19
Payer: MEDICARE

## 2025-06-19 DIAGNOSIS — E03.8 SUBCLINICAL HYPOTHYROIDISM: Primary | ICD-10-CM

## 2025-06-19 NOTE — TELEPHONE ENCOUNTER
Patient has slightly low thyroid levels.I would like to repeat these in 3-4 weeks. Non fasting is ok.   PK    Pt informed

## 2025-06-27 ENCOUNTER — OFFICE VISIT (OUTPATIENT)
Dept: PSYCHIATRY | Facility: HOSPITAL | Age: 65
End: 2025-06-27
Payer: MEDICARE

## 2025-06-27 DIAGNOSIS — F33.1 MAJOR DEPRESSIVE DISORDER, RECURRENT EPISODE, MODERATE: ICD-10-CM

## 2025-06-27 DIAGNOSIS — G47.33 OSA (OBSTRUCTIVE SLEEP APNEA): ICD-10-CM

## 2025-06-27 DIAGNOSIS — R53.0 NEOPLASTIC MALIGNANT RELATED FATIGUE: Primary | ICD-10-CM

## 2025-06-27 DIAGNOSIS — C50.919 PRIMARY MALIGNANT NEOPLASM OF BREAST WITH METASTASIS: ICD-10-CM

## 2025-06-27 DIAGNOSIS — F43.21 GRIEF: ICD-10-CM

## 2025-06-27 DIAGNOSIS — M79.2 NEUROPATHIC PAIN: ICD-10-CM

## 2025-06-27 RX ORDER — DULOXETIN HYDROCHLORIDE 30 MG/1
30 CAPSULE, DELAYED RELEASE ORAL DAILY
Qty: 90 CAPSULE | Refills: 0 | Status: SHIPPED | OUTPATIENT
Start: 2025-06-27

## 2025-06-27 RX ORDER — MODAFINIL 200 MG/1
200 TABLET ORAL DAILY
Qty: 30 TABLET | Refills: 2 | Status: SHIPPED | OUTPATIENT
Start: 2025-06-27 | End: 2025-07-21 | Stop reason: ALTCHOICE

## 2025-06-27 RX ORDER — GABAPENTIN 300 MG/1
300 CAPSULE ORAL 3 TIMES DAILY
Qty: 90 CAPSULE | Refills: 2 | Status: SHIPPED | OUTPATIENT
Start: 2025-06-27

## 2025-06-27 RX ORDER — DULOXETIN HYDROCHLORIDE 60 MG/1
60 CAPSULE, DELAYED RELEASE ORAL DAILY
Qty: 90 CAPSULE | Refills: 0 | Status: SHIPPED | OUTPATIENT
Start: 2025-06-27

## 2025-06-27 NOTE — PROGRESS NOTES
"[facility]  In Person Session    Subjective  Patient ID: Martha Davey is a 64 y.o. female is seen face to face in the [facility].    CC: Fatigue, depression, grief    HPI/ Interval History:   Pt is seen in follow up regarding ongoing experience of stress, anxiety, and grief in survivorship of metastatic breast cancer, recent loss of loved one, ongoing medical complexity.    Reports to be stressing too much, concerning this is having negative impact on her health. Shares example of going to pool with loved ones, unfortunately noting interpersonal conflict in this realm. Despite this, acknowledges she has been waking earlier, walking more. Appreciates small steps, while mourning \"only feeling half me.\" Reports wanting to be \"me again,\" acknowledging persistent impact of grief since loss of spouse. Father's day was especially hallenging. Wants to be able to honor Aki with marker on gravesite, not yet having done this. Has ideas of picture when they got , cardinal, small bird, etc. Would like to be able to honor him, recognizing he was not necessarily honored as he should have been during his life.    Has enjoyed going to the pool, appreciating benefit to pain, enjoying time with family. Pain remains intrusive; discussed increasing tramadol, although more recent prescription was still for 50 mg daily. Continues to engage with various specialists to maximize physical function and pain response. Greatly appreciating long standing rapport with Dr. Hancock guiding efforts and assisting with QOL goals including upcoming travel, desire to have surgery without burden over holidays. Does report recent discussion with PCP regarding thyroid dysfunction and elevated A1C. Support increase in walking, intentional hydration and healthy eating.    PPsychotropic psychotropic medications reviewed; continue to report adherence to duloxetine 90 mg daily, modafinil q AM and feels " these are working well.    Exam: As above    Recent Labs Reviewed:  Orders Only on 06/19/2025   Component Date Value    TSH 07/07/2025 4.150     Free T4 07/07/2025 1.37     Thyroid Peroxidase Antib* 07/07/2025 <9     Thyroglobulin Ab 07/07/2025 1.5 (H)    Office Visit on 06/17/2025   Component Date Value    Glucose 06/17/2025 84     BUN 06/17/2025 23     Creatinine 06/17/2025 0.97     EGFR Result 06/17/2025 65     BUN/Creatinine Ratio 06/17/2025 24     Sodium 06/17/2025 141     Potassium 06/17/2025 4.4     Chloride 06/17/2025 100     Total CO2 06/17/2025 25     Calcium 06/17/2025 9.6     WBC 06/17/2025 8.9     RBC 06/17/2025 4.08     Hemoglobin 06/17/2025 13.0     Hematocrit 06/17/2025 40.3     MCV 06/17/2025 99 (H)     MCH 06/17/2025 31.9     MCHC 06/17/2025 32.3     RDW 06/17/2025 20.4 (H)     Platelets 06/17/2025 233     Neutrophil Rel % 06/17/2025 75     Lymphocyte Rel % 06/17/2025 14     Monocyte Rel % 06/17/2025 9     Eosinophil Rel % 06/17/2025 2     Basophil Rel % 06/17/2025 0     Neutrophils Absolute 06/17/2025 6.6     Lymphocytes Absolute 06/17/2025 1.2     Monocytes Absolute 06/17/2025 0.8     Eosinophils Absolute 06/17/2025 0.1     Basophils Absolute 06/17/2025 0.0     Immature Granulocyte Rel* 06/17/2025 0     Immature Grans Absolute 06/17/2025 0.0     TSH 06/17/2025 6.950 (H)     25 Hydroxy, Vitamin D 06/17/2025 43.0     Hemoglobin A1C 06/17/2025 5.9 (H)    Lab on 06/06/2025   Component Date Value    Glucose 06/06/2025 178 (H)     BUN 06/06/2025 27.8 (H)     Creatinine 06/06/2025 0.92     Sodium 06/06/2025 141     Potassium 06/06/2025 4.4     Chloride 06/06/2025 101     CO2 06/06/2025 25.0     Calcium 06/06/2025 10.1     Total Protein 06/06/2025 6.8     Albumin 06/06/2025 4.2     ALT (SGPT) 06/06/2025 18     AST (SGOT) 06/06/2025 25     Alkaline Phosphatase 06/06/2025 88     Total Bilirubin 06/06/2025 0.4     Globulin 06/06/2025 2.6     A/G Ratio 06/06/2025 1.6     BUN/Creatinine Ratio 06/06/2025 30.2  (H)     Anion Gap 06/06/2025 15.0     eGFR 06/06/2025 69.7     WBC 06/06/2025 10.92 (H)     RBC 06/06/2025 4.43     Hemoglobin 06/06/2025 13.7     Hematocrit 06/06/2025 43.3     MCV 06/06/2025 97.7 (H)     MCH 06/06/2025 30.9     MCHC 06/06/2025 31.6     RDW 06/06/2025 21.6 (H)     RDW-SD 06/06/2025 76.2 (H)     MPV 06/06/2025 10.5     Platelets 06/06/2025 275     Neutrophil % 06/06/2025 78.4 (H)     Lymphocyte % 06/06/2025 15.0 (L)     Monocyte % 06/06/2025 5.0     Eosinophil % 06/06/2025 1.0     Basophil % 06/06/2025 0.4     Immature Grans % 06/06/2025 0.2     Neutrophils, Absolute 06/06/2025 8.56 (H)     Lymphocytes, Absolute 06/06/2025 1.64     Monocytes, Absolute 06/06/2025 0.55     Eosinophils, Absolute 06/06/2025 0.11     Basophils, Absolute 06/06/2025 0.04     Immature Grans, Absolute 06/06/2025 0.02     nRBC 06/06/2025 0.0    Office Visit on 06/02/2025   Component Date Value    Methamphetaine Screen, U* 06/03/2025 Negative     POC Amphetamines 06/03/2025 Negative     Barbiturates Screen 06/03/2025 Negative     Benzodiazepine Screen 06/03/2025 Negative     Cocaine Screen 06/03/2025 Negative     Methadone Screen 06/03/2025 Negative     Opiate Screen 06/03/2025 Negative     Oxycodone, Screen 06/03/2025 Negative     Phencyclidine (PCP) Scre* 06/03/2025 Negative     Propoxyphene Screen 06/03/2025 Negative     THC, Screen 06/03/2025 Negative     Tricyclic Antidepressant* 06/03/2025 Negative     Buprenorphine Screen 06/03/2025 Negative    Office Visit on 05/13/2025   Component Date Value    Ferritin 05/13/2025 636.00 (H)     Iron 05/13/2025 111     Iron Saturation (TSAT) 05/13/2025 33     Transferrin 05/13/2025 224     TIBC 05/13/2025 334    Lab on 05/13/2025   Component Date Value    Glucose 05/13/2025 126 (H)     BUN 05/13/2025 20     Creatinine 05/13/2025 0.93     Sodium 05/13/2025 142     Potassium 05/13/2025 4.0     Chloride 05/13/2025 105     CO2 05/13/2025 27.4     Calcium 05/13/2025 9.7     Total Protein  05/13/2025 6.8     Albumin 05/13/2025 4.4     ALT (SGPT) 05/13/2025 17     AST (SGOT) 05/13/2025 24     Alkaline Phosphatase 05/13/2025 82     Total Bilirubin 05/13/2025 0.3     Globulin 05/13/2025 2.4     A/G Ratio 05/13/2025 1.8     BUN/Creatinine Ratio 05/13/2025 21.5     Anion Gap 05/13/2025 9.6     eGFR 05/13/2025 68.8     WBC 05/13/2025 8.72     RBC 05/13/2025 4.42     Hemoglobin 05/13/2025 13.3     Hematocrit 05/13/2025 42.3     MCV 05/13/2025 95.7     MCH 05/13/2025 30.1     MCHC 05/13/2025 31.4 (L)     RDW 05/13/2025 23.0 (H)     RDW-SD 05/13/2025 80.7 (H)     MPV 05/13/2025 10.1     Platelets 05/13/2025 290     Neutrophil % 05/13/2025 77.1 (H)     Lymphocyte % 05/13/2025 14.0 (L)     Monocyte % 05/13/2025 6.4     Eosinophil % 05/13/2025 1.7     Basophil % 05/13/2025 0.6     Immature Grans % 05/13/2025 0.2     Neutrophils, Absolute 05/13/2025 6.72     Lymphocytes, Absolute 05/13/2025 1.22     Monocytes, Absolute 05/13/2025 0.56     Eosinophils, Absolute 05/13/2025 0.15     Basophils, Absolute 05/13/2025 0.05     Immature Grans, Absolute 05/13/2025 0.02     nRBC 05/13/2025 0.0    Labs reviewed    Current Psychotropic Medications:  Duloxetine 30 mg q AM, and 60 mg q hs  Modafinil 200 mg q AM     Plan of Care/ Medical Decision Making  Continue duloxetine and modafinil as written.  Support reconnection to therapy following trip as scheduled.  Supportive counseling continued surrounding grief, loss, means of honoring spouse, reflection, and ongoing care for self.  FU scheduled in 8 weeks.    Diagnoses and all orders for this visit:    1. Neoplastic malignant related fatigue (Primary)  -     Discontinue: modafinil (PROVIGIL) 200 MG tablet; Take 1 tablet by mouth Daily.  Dispense: 30 tablet; Refill: 2    2. Metastatic breast cancer    3. Grief    4. Major depressive disorder, recurrent episode, moderate    5. MARIA M (obstructive sleep apnea)    6. Neuropathic pain  -     gabapentin (NEURONTIN) 300 MG capsule; Take 1  capsule by mouth 3 (Three) Times a Day. Takes 1 tab during daytime and 2 at bedtime.  Dispense: 90 capsule; Refill: 2    Other orders  -     DULoxetine (Cymbalta) 60 MG capsule; Take 1 capsule by mouth Daily.  Dispense: 90 capsule; Refill: 0  -     DULoxetine (Cymbalta) 30 MG capsule; Take 1 capsule by mouth Daily. Take in conjunction with 60 mg q evening.  Dispense: 90 capsule; Refill: 0    I spent 34 minutes caring for Martha on this date of service. This time includes time spent by me in the following activities: preparing for the visit, reviewing tests, obtaining and/or reviewing a separately obtained history, performing a medically appropriate examination and/or evaluation, counseling and educating the patient/family/caregiver, ordering medications, tests, or procedures, and documenting information in the medical record.

## 2025-07-07 ENCOUNTER — INFUSION (OUTPATIENT)
Dept: ONCOLOGY | Facility: HOSPITAL | Age: 65
End: 2025-07-07
Payer: MEDICARE

## 2025-07-07 ENCOUNTER — LAB (OUTPATIENT)
Dept: OTHER | Facility: HOSPITAL | Age: 65
End: 2025-07-07
Payer: MEDICARE

## 2025-07-07 ENCOUNTER — OFFICE VISIT (OUTPATIENT)
Dept: ONCOLOGY | Facility: CLINIC | Age: 65
End: 2025-07-07
Payer: MEDICARE

## 2025-07-07 ENCOUNTER — SPECIALTY PHARMACY (OUTPATIENT)
Dept: ONCOLOGY | Facility: HOSPITAL | Age: 65
End: 2025-07-07
Payer: MEDICARE

## 2025-07-07 VITALS
DIASTOLIC BLOOD PRESSURE: 75 MMHG | BODY MASS INDEX: 33.57 KG/M2 | WEIGHT: 171 LBS | HEART RATE: 98 BPM | TEMPERATURE: 98.6 F | SYSTOLIC BLOOD PRESSURE: 114 MMHG | RESPIRATION RATE: 17 BRPM | OXYGEN SATURATION: 97 % | HEIGHT: 60 IN

## 2025-07-07 DIAGNOSIS — C50.811 MALIGNANT NEOPLASM OF OVERLAPPING SITES OF RIGHT BREAST IN FEMALE, ESTROGEN RECEPTOR NEGATIVE: Primary | ICD-10-CM

## 2025-07-07 DIAGNOSIS — Z17.1 MALIGNANT NEOPLASM OF OVERLAPPING SITES OF RIGHT BREAST IN FEMALE, ESTROGEN RECEPTOR NEGATIVE: Primary | ICD-10-CM

## 2025-07-07 DIAGNOSIS — C79.49 METASTASIS TO SPINAL CORD: ICD-10-CM

## 2025-07-07 DIAGNOSIS — Z79.899 HIGH RISK MEDICATION USE: ICD-10-CM

## 2025-07-07 DIAGNOSIS — C50.811 MALIGNANT NEOPLASM OF OVERLAPPING SITES OF RIGHT BREAST IN FEMALE, ESTROGEN RECEPTOR NEGATIVE: ICD-10-CM

## 2025-07-07 DIAGNOSIS — Z17.1 MALIGNANT NEOPLASM OF OVERLAPPING SITES OF RIGHT BREAST IN FEMALE, ESTROGEN RECEPTOR NEGATIVE: ICD-10-CM

## 2025-07-07 DIAGNOSIS — C50.919 PRIMARY MALIGNANT NEOPLASM OF BREAST WITH METASTASIS: ICD-10-CM

## 2025-07-07 LAB
ALBUMIN SERPL-MCNC: 4.1 G/DL (ref 3.5–5.2)
ALBUMIN/GLOB SERPL: 1.4 G/DL
ALP SERPL-CCNC: 78 U/L (ref 39–117)
ALT SERPL W P-5'-P-CCNC: 17 U/L (ref 1–33)
ANION GAP SERPL CALCULATED.3IONS-SCNC: 10.1 MMOL/L (ref 5–15)
AST SERPL-CCNC: 26 U/L (ref 1–32)
BASOPHILS # BLD AUTO: 0.06 10*3/MM3 (ref 0–0.2)
BASOPHILS NFR BLD AUTO: 1 % (ref 0–1.5)
BILIRUB SERPL-MCNC: 0.3 MG/DL (ref 0–1.2)
BUN SERPL-MCNC: 26.8 MG/DL (ref 8–23)
BUN/CREAT SERPL: 30.5 (ref 7–25)
CALCIUM SPEC-SCNC: 10.2 MG/DL (ref 8.6–10.5)
CHLORIDE SERPL-SCNC: 101 MMOL/L (ref 98–107)
CO2 SERPL-SCNC: 29.9 MMOL/L (ref 22–29)
CREAT SERPL-MCNC: 0.88 MG/DL (ref 0.57–1)
DEPRECATED RDW RBC AUTO: 69.7 FL (ref 37–54)
EGFRCR SERPLBLD CKD-EPI 2021: 73.5 ML/MIN/1.73
EOSINOPHIL # BLD AUTO: 0.03 10*3/MM3 (ref 0–0.4)
EOSINOPHIL NFR BLD AUTO: 0.5 % (ref 0.3–6.2)
ERYTHROCYTE [DISTWIDTH] IN BLOOD BY AUTOMATED COUNT: 18.7 % (ref 12.3–15.4)
GLOBULIN UR ELPH-MCNC: 2.9 GM/DL
GLUCOSE SERPL-MCNC: 118 MG/DL (ref 65–99)
HCT VFR BLD AUTO: 41.3 % (ref 34–46.6)
HGB BLD-MCNC: 13.6 G/DL (ref 12–15.9)
IMM GRANULOCYTES # BLD AUTO: 0.02 10*3/MM3 (ref 0–0.05)
IMM GRANULOCYTES NFR BLD AUTO: 0.3 % (ref 0–0.5)
LYMPHOCYTES # BLD AUTO: 1.28 10*3/MM3 (ref 0.7–3.1)
LYMPHOCYTES NFR BLD AUTO: 22.1 % (ref 19.6–45.3)
MAGNESIUM SERPL-MCNC: 2 MG/DL (ref 1.6–2.4)
MCH RBC QN AUTO: 32.9 PG (ref 26.6–33)
MCHC RBC AUTO-ENTMCNC: 32.9 G/DL (ref 31.5–35.7)
MCV RBC AUTO: 99.8 FL (ref 79–97)
MONOCYTES # BLD AUTO: 0.37 10*3/MM3 (ref 0.1–0.9)
MONOCYTES NFR BLD AUTO: 6.4 % (ref 5–12)
NEUTROPHILS NFR BLD AUTO: 4.03 10*3/MM3 (ref 1.7–7)
NEUTROPHILS NFR BLD AUTO: 69.7 % (ref 42.7–76)
NRBC BLD AUTO-RTO: 0 /100 WBC (ref 0–0.2)
PHOSPHATE SERPL-MCNC: 3 MG/DL (ref 2.5–4.5)
PLATELET # BLD AUTO: 232 10*3/MM3 (ref 140–450)
PMV BLD AUTO: 10.3 FL (ref 6–12)
POTASSIUM SERPL-SCNC: 4.4 MMOL/L (ref 3.5–5.2)
PROT SERPL-MCNC: 7 G/DL (ref 6–8.5)
RBC # BLD AUTO: 4.14 10*6/MM3 (ref 3.77–5.28)
SODIUM SERPL-SCNC: 141 MMOL/L (ref 136–145)
T4 FREE SERPL-MCNC: 1.37 NG/DL (ref 0.92–1.68)
TSH SERPL DL<=0.05 MIU/L-ACNC: 4.15 UIU/ML (ref 0.27–4.2)
WBC NRBC COR # BLD AUTO: 5.79 10*3/MM3 (ref 3.4–10.8)

## 2025-07-07 PROCEDURE — 96372 THER/PROPH/DIAG INJ SC/IM: CPT

## 2025-07-07 PROCEDURE — 85025 COMPLETE CBC W/AUTO DIFF WBC: CPT | Performed by: INTERNAL MEDICINE

## 2025-07-07 PROCEDURE — 86376 MICROSOMAL ANTIBODY EACH: CPT | Performed by: INTERNAL MEDICINE

## 2025-07-07 PROCEDURE — 86800 THYROGLOBULIN ANTIBODY: CPT | Performed by: INTERNAL MEDICINE

## 2025-07-07 PROCEDURE — 1125F AMNT PAIN NOTED PAIN PRSNT: CPT | Performed by: NURSE PRACTITIONER

## 2025-07-07 PROCEDURE — 80053 COMPREHEN METABOLIC PANEL: CPT | Performed by: INTERNAL MEDICINE

## 2025-07-07 PROCEDURE — 83735 ASSAY OF MAGNESIUM: CPT | Performed by: INTERNAL MEDICINE

## 2025-07-07 PROCEDURE — 3078F DIAST BP <80 MM HG: CPT | Performed by: NURSE PRACTITIONER

## 2025-07-07 PROCEDURE — 3074F SYST BP LT 130 MM HG: CPT | Performed by: NURSE PRACTITIONER

## 2025-07-07 PROCEDURE — 36415 COLL VENOUS BLD VENIPUNCTURE: CPT

## 2025-07-07 PROCEDURE — 25010000002 DENOSUMAB 120 MG/1.7ML SOLUTION: Performed by: NURSE PRACTITIONER

## 2025-07-07 PROCEDURE — 84100 ASSAY OF PHOSPHORUS: CPT | Performed by: INTERNAL MEDICINE

## 2025-07-07 PROCEDURE — 84439 ASSAY OF FREE THYROXINE: CPT | Performed by: INTERNAL MEDICINE

## 2025-07-07 PROCEDURE — 99214 OFFICE O/P EST MOD 30 MIN: CPT | Performed by: NURSE PRACTITIONER

## 2025-07-07 PROCEDURE — 84443 ASSAY THYROID STIM HORMONE: CPT | Performed by: INTERNAL MEDICINE

## 2025-07-07 RX ADMIN — DENOSUMAB 120 MG: 120 INJECTION SUBCUTANEOUS at 12:07

## 2025-07-07 NOTE — PROGRESS NOTES
Chief Complaint  Previous Stage Ib ([id number]) ER/AL positive, HER-2/peter negative right breast cancer with subsequent metastatic disease identified 10/8/2017, history of right pulmonary embolism, cancer related pain, chemotherapy-induced diarrhea, chemotherapy-induced mucositis, chemotherapy-induced hand-foot syndrome    Subjective        History of Present Illness  The patient returns today in follow-up with laboratory studies, CT scans, bone scan to review continuing on oral Xeloda 1000 mg in the morning, 1500 mg in the evening for 7 days on followed by 7 days off as well as every 3-month Xgeva (last received on 4/14/2025) and Eliquis 5 mg twice daily.  The patient has been maintained on treatment with single agent Xeloda since 2/7/2018.  She has however had periods of time off of treatment both related to hand-foot syndrome as well as more recently in March 2025 due to diarrhea.  She does continue with chronic side effects from treatment including hand-foot syndrome and sclerodactyly.  She continues to use emollient cream frequently 4-5 times per day as well as topical triamcinolone intermittently (2 weeks on followed by 2 weeks off as instructed by dermatology).     The patient has had iron deficiency as well and received treatment with Injectafer 750 mg IV on 4/14 and 4/21/2025 as she is intolerant of oral iron.    Patient continues follow-up with supportive oncology last seen on 6/27/2025 and is continuing on Cymbalta 30 mg a.m., 60 mg p.m., gabapentin 300 mg in the morning and 600 mg at night as well as Provigil 200 mg daily.    As she is reviewed back today, she feels that she is at her baseline, overall doing well.  She continues with chronic shoulder pain and is still hoping to maybe undergo shoulder replacement surgery in October 2025.  She has been seen by pain management and continues on tramadol now at a dose of 100 mg every 8 hours as needed.  She does have Percocet also to use if needed.    She  "denies other new concerns today      Objective   Vital Signs:   /75   Pulse 98   Temp 98.6 °F (37 °C)   Resp 17   Ht 152.4 cm (60\")   Wt 77.6 kg (171 lb)   SpO2 97%   BMI 33.40 kg/m²     Physical Exam  Constitutional:       Appearance: She is well-developed.      Comments: Patient ambulating with use of a cane   Eyes:      Conjunctiva/sclera: Conjunctivae normal.   Neck:      Thyroid: No thyromegaly.   Cardiovascular:      Rate and Rhythm: Normal rate and regular rhythm.      Heart sounds: No murmur heard.     No friction rub. No gallop.   Pulmonary:      Effort: No respiratory distress.      Breath sounds: Normal breath sounds.   Abdominal:      General: Bowel sounds are normal. There is no distension.      Palpations: Abdomen is soft.      Tenderness: There is no abdominal tenderness.   Musculoskeletal:      Comments: No edema.  Brace in place left lower extremity   Lymphadenopathy:      Head:      Right side of head: No submandibular adenopathy.      Cervical: No cervical adenopathy.      Upper Body:      Right upper body: No supraclavicular adenopathy.      Left upper body: No supraclavicular adenopathy.   Skin:     General: Skin is warm and dry.      Findings: Rash present.      Comments: Hand-foot symptoms have improved.  There is some mild erythema and some mild dry, cracked skin.  The degree of sclerodactyly has diminished from prior exams.  Slight patchy erythema in the dorsal aspect of the fingers and hand unchanged   Neurological:      Mental Status: She is alert and oriented to person, place, and time.      Cranial Nerves: No cranial nerve deficit.      Motor: No abnormal muscle tone.      Deep Tendon Reflexes: Reflexes normal.   Psychiatric:         Behavior: Behavior normal.           Result Review :   Results from last 7 days   Lab Units 07/07/25  1042   WBC 10*3/mm3 5.79   NEUTROS ABS 10*3/mm3 4.03   HEMOGLOBIN g/dL 13.6   HEMATOCRIT % 41.3   PLATELETS 10*3/mm3 232     Results from last " 7 days   Lab Units 07/07/25  1042   SODIUM mmol/L 141   POTASSIUM mmol/L 4.4   CHLORIDE mmol/L 101   CO2 mmol/L 29.9*   BUN mg/dL 26.8*   CREATININE mg/dL 0.88   CALCIUM mg/dL 10.2   ALBUMIN g/dL 4.1   BILIRUBIN mg/dL 0.3   ALK PHOS U/L 78   ALT (SGPT) U/L 17   AST (SGOT) U/L 26   GLUCOSE mg/dL 118*   MAGNESIUM mg/dL 2.0               Assessment and Plan     *Previous Stage Ib ([id number]) right breast cancer:  Diagnosed May 2010 with excisional biopsy for invasive ductal carcinoma, 1.3 cm, grade 2, ER 90%, ID 80%, HER-2/peter negative (1+ IHC).    Subsequent right mastectomy in July 2010 with no residual breast malignancy, 1/5 sentinel lymph node with micrometastasis (0.25 mm).    Treated in the [facility] system with adjuvant AC ×4 cycles in 2010 (no taxanes administered due to underlying Charcot-Saloni-Tooth with peripheral neuropathy).    Adjuvant Femara (postmenopausal) initiated October 2010 with plan to treat ×10 years.    Genetic testing reportedly negative.    Developed osteopenia treated with Prolia beginning 2/27/13. Subsequently discontinued due to identification of metastatic disease.    *Recurrent/metastatic disease identified 10/8/17:  Disease involving thoracic spine with cord compression at T6, lumbosacral involvement, sternal and right sternoclavicular involvement.    Femara discontinued in 10/2017.    Radiation administered (in the [facility] system) to the thoracic spine beginning 10/19/17 treating T3-T9 to a dose of 24 gray in 6 fractions.  Evaluation with MRI 12/8/17 showing persistent T6 cord compression with persistent neurologic compromise requiring surgical treatment 12/11/17 with T6 laminectomy/corpectomy and T3-T9 fusion.  Pathology with metastatic carcinoma of breast origin, ER negative, ID negative, HER-2/peter negative (1+ IHC).  Additional staging evaluation 12/8/17 with no evidence of visceral metastatic disease, bone scan showing involvement of thoracic spine, sternum, left humerus, mid frontal  bone.  No plane film correlate of left humerus lesion.  MRI lumbar spine with small intradural L3 metastasis.  CA 15-3 12/6/17- 17.  Palliative radiation therapy to L3 dural metastasis and left humerus initiated 1/15/18 (10 fractions), completed 1/26/18.  Hypercalcemia of malignancy with calcium in the 10-11 range.  Initiation of monthly Xgeva 1/23/18.  Baseline CT scan 1/30/18 with no evidence of visceral involvement.  Cluster of nodular opacities in the right lower lobe suspected to be infectious or related to bronchiolitis. Bone scan 1/30/18 showed postsurgical change in the thoracic spine, stable uptake in the frontal bone, no new areas of disease.  Initiation of palliative oral single agent Xeloda 2/7/18 2000 mg a.m., 1500 mg p.m. for 14/21 days.   Following 3 cycles xeloda, bone scan 4/4/18 showed no change from the prior study.  CT scan 4/4/18 showed a small pericardial effusion of unclear significance as well as a subcutaneous nodule in the right lateral chest wall.  Subsequent evaluation with echocardiogram 4/17/18 showed no evidence of pericardial effusion.  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  We discussed the possibility of surgical excision of the right subcutaneous chest wall lesion for more definitive diagnosis.  Reviewed previous CT images dating back to 12/8/17 and the lesion was present even at that time measuring around 1.7 cm although not commented on in the radiology report.  As this appears to be an indolent low-grade process unrelated to her breast cancer, recommendee foregoing surgical excision at this time and monitoring this area on future scans.  The patient agreed.    Following 6 cycles of Xeloda, CT 6/6/18  showed stable findings, no evidence of progressive disease.  There was a comment regarding subcutaneous abnormality in the anterior  abdominal wall and this was related to Lovenox injection sites.  Bone scan 6/6/18 showed no interval change.   CT scan and bone scan 8/13/18 following 9 cycles of Xeloda showed stable findings with no evidence of significant visceral metastases.  Her bone lesions appear stable on bone scan.  The spindle cell neoplasm in her right chest wall actually decreased in size from 2 cm down to 1.6 cm.    The patient experienced some symptoms of diarrhea, anorexia, generalized weakness during cycle 9 Xeloda it was unclear whether this was related to a viral gastroenteritis or toxicity from treatment.  Symptoms recurred during cycle 10 and treatment was cut short by 2 days.  Symptoms attributed to Xeloda.  With cycle 11, dose and schedule altered to 1500 mg twice daily for 7 days on followed by 7 days off .  CT scan 9/9/2020 with no significant changes.  Bone scan 9/9/2020 read as unchanged from prior studies however did note an area of slight activity in the medial left femur.  Contacted radiology and although this was not noted on prior reports appears to have been present.  Subsequent MRI left femur 9/21/2020 with cortical thickening and periosteal edema left iliac us muscle insertion to the medial left femur with no evidence of metastatic disease, favored to represent periosteal change secondary to insertional tendinitis.  Severe hand-foot symptoms causing sclerodactyly and limitation in finger movement prompted change in dosing in July 2021 with Xeloda decreased to 1000 mg a.m., 1500 mg p.m. for 7 days on followed by 7 days off.  Patient was experiencing severe hand-foot syndrome related to Xeloda having developed skin peeling, sclerodactyly with limited use of her hands.  On 3/31/2022, Xeloda was held to allow for recovery.  Patient resumed Xeloda on 4/18/2022.  Patient required hospitalization 5/29 - 6/1/2022 with presumed viral gastroenteritis that was exacerbated by Xeloda.  Xeloda held briefly, resumed on  6/6/2022.  Patient again with worsening sclerodactyly, Xeloda held for 2 weeks from 10/3 - 10/17/2022, resumed at prior dose with improvement in symptoms.  Scans on 3/3/2023 with CT chest abdomen pelvis and bone scan showing stable findings.  CT chest abdomen pelvis 7/3/2023 with questionable 1 mm change in size right lower lobe nodule.  Additional small pulmonary nodules stable.  Stable bony metastatic disease.  Bone scan on 7/7/2023 however showed slight progression focal involvement right ischium and superior pubic ramus (new ischial lesion superior pubic ramus compared to 10/24/2022 and more conspicuous compared to 3/3/2023).  Metastatic lesion right inferior pubic ramus and ischial tuberosity increased in size since October 2022 however stable from 3/3/2023.  MRI cervical spine 7/3/2023 with no metastatic involvement however identification of the lesion at T2, recommended dedicated thoracic spine MRI to evaluate further.  Given the minimal extent and slow degree of progression, elected to continue oral Xeloda and evaluate further with MRI pelvis and thoracic spine.  Consideration of biopsy pelvic metastasis for repeat ER/SC/HER2/peter testing.    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  CA 15-3 on 7/10/2023 was 12.2, uninformative.  MRI thoracic spine 7/27/2023 with 1 cm metastatic focus seen in L1  MRI pelvis 7/27/2023 with metastatic foci identified right superior pubic ramus, right ischial tuberosity, inferior pubic ramus, L5 body.  Chronic appearing posttraumatic and/or degenerative change in the posterior right ilium adjacent SI joint.  CT-guided biopsy right pubic ramus lesion on 8/31/2023.  Pathology showed no evidence of malignancy, showed markedly calcified and sclerotic mature lamellar bone.  Attempted decalcification but no evidence of malignancy.  CT chest abdomen pelvis 9/8/2023 and bone scan 9/11/2023 with stable findings.  CT chest abdomen  pelvis 12/8/2023 with possible slight increase in right chest wall subcutaneous lesion (1.8 x 1.1 up to 1.8 x 1.4 cm) although may be related to altered positioning.  Nonpathologically enlarged right axillary and subpectoral lymph nodes slightly increased (patient notes she received RSV vaccine right arm just prior to scan).  Bone scan 12/11/2023 with stable findings.  CT chest abdomen pelvis 3/19/2024 with slight interval decrease in size of right axillary and bilateral subpectoral lymph nodes, right subcutaneous soft tissue nodule slightly smaller, stable pulmonary nodules, stable bone metastases.  Bone scan 3/19/2024 with stable findings.  CT chest abdomen pelvis 6/21/2024 showed left subpectoral lymph node to have increased slightly in size (0.9 x 0.6 up to 1.3 x 0.8 cm).  Subcutaneous nodule right chest wall decreased from 1.1 x 1.9 down to 1.1 x 1.4 cm.  Scan otherwise stable.  Bone scan 6/21/2024 with stable findings  CT chest abdomen pelvis 9/23/2024 with resolution of left subpectoral lymphadenopathy, additional findings stable.  Bone scan 9/23/2024 with stable findings.  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  She stopped Xeloda 1 week prior to surgery.  She resumed Xeloda on 1/14/2025 at previous dosing schedule  CT chest abdomen pelvis 2/14/2025 showed stable findings and bone, no evidence of visceral disease.  Bone scan with increased activity right L5/S1 disc space felt to be degenerative with unchanged CT appearance of the vertebral bodies.  Postoperative activity noted left midfoot.  Previous sites of disease activity stable.  Patient briefly discontinued Xeloda on her own for approximately 6 weeks from early March 2025 until mid April 2025 for unclear reasons.  Patient subsequently resumed treatment at prior dosing  CT chest abdomen pelvis on 6/4/2025 showed stable disease.  Bone scan on 6/4/2025 with stable  disease.  The patient returns today continuing on treatment with Xeloda 1000 mg a.m., 1500 mg p.m. 7 days on followed by 7 days off (currently receiving and on week of Xeloda initiated 6/2/2025).  She is continuing on Xgeva every 3 months, due today. 7/7/2025.  She has been on single agent Xeloda since 2/7/2018.  Patient did have an approximately 6-week period of time off of Xeloda from early March until mid April 2025, was experiencing diarrhea after a course of antibiotics for UTI and elected to stop all of her medications for this period of time.  She resumed therapy however and is tolerating well.  Her hand-foot symptoms are mild with erythema, mild skin cracking and minimal sclerodactyly.  She is not experiencing any significant diarrhea at this time.  Appetite is normal.  She maintains ECOG performance status of 1.  She will see Dr. Hancock back in follow-up on 8/18/2025 after she returns from her vacation.  We will discuss timeframe for follow-up scans at a 4 to 6-month interval depending on her clinical status.    *Right pulmonary embolism:  Diagnosed on CT angiogram 10/21/17 involving small right lower lobe pulmonary artery.  Lower extremity Dopplers negative.  Bilateral lower extremity Dopplers negative again 12/5/17.  Received chronic Lovenox 1 mg/kg twice per day, transitioned to oral Eliquis in February 2019, continuing on Eliquis 5 mg twice daily.  Patient continues on Eliquis 5 mg twice daily    *Cancer related pain:  Previously receiving Duragesic 50 µg patch every 72 hours along with Dilaudid 4 mg as needed for breakthrough pain  The patient's pain improved over time and she was able to discontinue both Duragesic and Dilaudid in the interval.  Patient does take occasional Flexeril at bedtime due to back spasm/pain when she has been more active.  The patient does have some occasional aggravation of her chronic back pain and does use tramadol 50 mg every 8 hours as needed  Patient was receiving tramadol  50 mg every 8-12 hours as needed in addition to hydrocodone 5/325 every 4 hours as needed.  She was also taking OTC NSAIDs.  Patient developed nausea related to hydrocodone and was transitioned to Percocet 5/325 every 4 hours as needed, advised to stop taking NSAIDs with ongoing anticoagulation.  Patient has chronic pain in her shoulders that does wax and wane is unrelated to her malignancy.  She was recently seen by pain management and is continuing on tramadol and increased dose of 100 mg every 8 hours as needed in addition to Percocet 7.5/325 every 4 hours as needed..  She usually requires 2 doses of tramadol per day and 1 dose of Percocet at night.     *Chemotherapy-induced diarrhea with subsequent C. difficile colitis in the setting of ulcerative colitis.  History of C. difficile colitis and gastroenteritis secondary to enteropathogenic E. coli:  Patient with reported history of ulcerative colitis, continues on mesalamine.  The patient developed initial diarrhea related to Xeloda at regular dosing.  Symptoms improved on reduced dose Xeloda  Flare of symptoms in October 2018 with apparent finding of C. difficile colitis by GI, treated with course of oral vancomycin with improvement in symptoms.  Patient notes minimal intermittent diarrhea/loose stools on Xeloda requiring occasional dosing of Imodium.    Patient required hospitalization 5/29 - 6/1/2022 with presumed viral gastroenteritis that was exacerbated by Xeloda.  Xeloda held briefly, resumed on 6/6/2022.  Patient's  developed C. difficile colitis and patient was experiencing increase in diarrhea.  Stool studies performed 8/4/22 negative C. difficile however GI PCR was positive for enteropathogenic E. coli.  Given patient's symptoms and immunocompromise status it was elected to treat her with azithromycin 500 mg daily x3 days beginning 8/5/2022  Diarrhea resolved  Patient underwent colonoscopy on 2/28/2023 with findings of diverticulosis  Patient has  had intermittent episodes of diarrhea, relieved with use of Imodium.  Recently has been fairly normal    *Traumatic left tibia/fibular fracture:  Status post ORIF 12/6/17  Specimen was sent for pathologic review, negative for evidence of malignancy    *Hypercalcemia:  Suspect hypercalcemia of malignancy, calcium in  10-11 range previously.  Calcium normalized following initiation of monthly Xgeva on 1/23/18.    *Chemotherapy-induced mucositis:  Patient has not experienced any recent difficulty with mucositis.    *Recurrent UTI, bladder wall thickening on CT:  Patient had an enterococcal UTI on 3/2/18 sensitive to nitrofurantoin and received treatment, unclear how long.  Recurrent UTI 3/20/18 with urine culture growing Klebsiella, initially treated with nitrofurantoin, transitioned to Levaquin.  CT 4/4/18 with diffuse bladder wall thickening with increased nodular thickening at the left base.  Referral to urogynecology Dr. May Johnson.  She was placed on a prophylactic dose of trimethoprim 100 mg daily, bladder wall thickening felt to be related to recent recurrent urinary tract infections.  Patient with urinary symptoms, treated with course of Macrobid at the end of December 2018, urine culture however was negative 12/31/18.  Patient was found to have Klebsiella UTI 7/29/2019 which was successfully treated with Macrobid with complete resolution of symptoms.  CT 8/10/2021 with diffuse bladder wall thickening new from 5/17/2021 (however seen on multiple prior scans).    UTI on 10/18/2021 with E. coli greater than 100,000 colonies that was pansensitive, treated with Macrobid x7 days  With ongoing/recurrent UTIs patient was seen by urogynecology and initiated suppressive therapy with trimethoprim 100 mg daily in December 2021.  She has not experienced any further urinary tract infections.  CT abdomen pelvis 3/28/2022 does show diffuse thickening of urinary bladder as has been seen on prior studies indicative of  cystitis.  Patient notes that she did stop taking trimethoprim on a daily basis.    Patient with urine culture on 5/5/2023 that grew E. coli and she received antibiotic treatment from urogynecology.    Urine culture on 8/23/2023 with greater than 100,000 colonies of E. coli resistant to ampicillin and Bactrim.  Received 5-day course of Cipro with resolution of symptoms.  Patient did have UTI with E. coli on culture 10/2/2023, received a course of Augmentin.  Patient with ongoing intermittent urinary tract infections.     *Charcot-Saloni-Tooth with mobility difficulties:  The patient underwent an intensive course of [facility] at [facility].  She graduated from her outpatient course November 2018.  Overall the patient has improved dramatically in terms of mobility,   Patient developed significant callus formation lateral aspect of the left plantar surface.    Patient developed skin erosion and an ulceration on the lateral aspect of her left foot.    Patient required surgery on her left foot by Dr. Lopez on 12/10/2024  Patient had prolonged recovery from her left foot surgery.  She is now finally out of her boot and is using a brace on her lower leg.  She is ambulating with a cane.  She has been released to drive.    *Depression:  The patient is continuing follow-up in the supportive oncology clinic and is currently continuing on Cymbalta 30 mg a.m. and 60 mg p.m., gabapentin 300 mg a.m. and 600 mg nightly, Ativan 0.5 mg nightly as needed, Provigil 200 mg daily.  Patient does continue to attend support groups at [facility].  The patient does continue routine follow-up in supportive oncology clinic.    Patient does have multiple stressors with her 's malignancy and chemotherapy as well as her autistic son who is living with them at home.  Patient continues to deal with grief related to her 's death.  She has seen a grief counselor through hospice which helped significantly.  She continues routine  follow-up with supportive oncology as well.      *Hand-foot syndrome secondary to Xeloda:  Patient continues with frequent application of emollient cream to the hands and feet  Symptoms increased significantly requiring a 1 week delay in cycle 18 Xeloda as noted above.  Symptoms did improve and she continued on the same dose.    Patient was referred to dermatology and has been continuing on triamcinolone 0.1% cream used for 1 week on followed by 1 week off which led to some further improvement.   Progressive palmar erythema with development of sclerodactyly and effect on dexterity.  Addition of urea-based cream 3 times daily.  Patient with continued difficulty regarding erythema of her hands that extended onto the dorsal aspect and was causing contractures/sclerodactyly in her fingers affecting her dexterity.  In July 2021, held Xeloda for an additional week and then reduced dose from 1500 mg twice daily down to 1000 mg a.m. and 1500 mg p.m. and continued on a 7-day on followed by 7-day off schedule.  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  On 3/31/2022, Xeloda was held to allow for recovery.  Patient resumed Xeloda on 4/18/2022.  Patient developed worsening sclerodactyly affecting dexterity that required Xeloda to again be briefly held for 2 weeks from 10/3 - 10/17/2022.  Treatment resumed at prior dose after improvement in symptoms.  Patient continues to use emollient cream frequently and topical triamcinolone 0.1% twice daily intermittently (2 weeks on followed by 2 weeks off as instructed by dermatology).  Symptoms currently are mild and manageable.  Degree of sclerodactyly did improved with recent 6-week break from Xeloda.    *Evidence of steatosis on scans with mild  intermittent elevated liver function studies:  Liver function studies increased 8/20/19 with ALT 98, AST 70, normal total bilirubin.  Negative viral hepatitis A, B, and C panel 8/23/18  Likely related to hepatic steatosis.   Subsequent improvement in LFTs  LFTs today are normal     *Chemotherapy induced leukopenia:  WBC today is 5.79.      *GERD, question of celiac disease:  Patient with significant history of reflux  Patient currently receiving Protonix 40 mg daily daily and Carafate 1 g 4 times daily as needed  Patient required hospitalization 5/29 - 6/1/2022 with presumed viral gastroenteritis that was exacerbated by Xeloda.    EGD performed 5/31/2022 during hospitalization with biopsy that showed focal blunting of villi and atrophy with slight increase in intraepithelial lymphocytes.  Changes were minimal but recommended correlation with serology to exclude celiac disease.  Celiac serology 8/8/2022 negative  Patient previously developed worsening reflux symptoms, temporarily increase Protonix to 40 mg twice daily and we did add Carafate 1 g 4 times daily.    She is taking Protonix 40 mg once daily    *Insomnia:  Patient with prior paradoxical reaction to Benadryl  Improved previously on temazepam 15 mg nightly as needed.   Patient noted subsequently that temazepam was having no effect.   Patient continuing on gabapentin 600 mg nightly    *Health maintenance:  Patient notes that she has a history of colon polyps as well as ulcerative colitis and was due for a follow-up colonoscopy on 9/12/2019.  We did discuss there is no necessity to pursue colonoscopy in the setting of her metastatic breast cancer.    The patient did undergo colonoscopy on 2/7/2020 with findings of muscular hypertrophy and diverticulosis.   Patient did wish to proceed with further colonoscopic evaluation, performed on 12/20/2022 with poor prep.  Repeat 2/28/2023 with diverticulosis.    *Bilateral shoulder pain:  Chronic difficulty with right  shoulder although she has not complained of this in prior visits to our office.  She reported difficulty with abduction.    The patient did undergo MRI of her right shoulder on 11/21/2019 at an outside facility showing multiple abnormalities including supraspinatus tendinosis, labral tear but no evidence of metastatic disease.  Patient developed pain in the left shoulder, was seen by orthopedics and underwent steroid injection with some improvement.  Right shoulder stable.  Patient did undergo physical therapy with some improvement.  She continues to use tramadol 50 mg every 8-12 hours as needed.  Note that current CT 10/20/2022 made notation of left shoulder probable bursitis.    Patient continues with bilateral shoulder pain which does wax and wane.  She recently underwent bilateral shoulder injections.  She notes that it was recommended for her to undergo bilateral shoulder replacement surgeries.  There is consideration of potential shoulder replacement surgery on the left in October.    *Ocular changes in part related to Xeloda:  Patient experienced a mild degree of blurred vision as well as burning and pruritus.  She was seen by her ophthalmologist and was placed on xiidra ophthalmic drops which have helped.  Likely both issues are to some extent related to Xeloda.  Symptoms did worsen and patient was seen by ophthalmologist at [facility].  She is now using an ocular lubricant at night in addition to artificial tears which have helped.  Symptoms currently stable to improved    *Elevated glucose:  It is noted the patient's glucose at the last few visits has been in the high 100 range, postprandial.  She has had a hemoglobin A1c that has been in the high 5-6 range in the past  Hemoglobin A1c was last checked 6/17/2025 and was 5.9 (stable)    *Possible loosening of pedicle screw at T3:  CT cervical spine 5/17/2021 showed loosening of the left pedicle screw at T3.  Patient referred to orthopedics and  was seen by Dr. Nayak who felt that there were no concerning findings on the scan    *Iron deficiency anemia  Labs on 5/2/2022 with hemoglobin 10.8.  Additional labs with iron 48, ferritin 21.2, iron saturation 11%, TIBC 4 and 32, folate greater than 20, B12 greater than 2000.    Attempted treatment with oral iron daily however patient was intolerant  Patient subsequently received Venofer 300 mg weekly x4 beginning 6/6/2022 and completed on 6/27/2022  Subsequent iron studies on 7/11/2022 showed improvement with iron 62, ferritin 345, iron saturation 18%, TIBC 336.  Hemoglobin had improved up to 12.7 at that time.  Repeat iron studies on 10/3/2022 showed iron replete status with hemoglobin 13.7, iron 121, ferritin 208.7, iron saturation 31%, TIBC 392.  Labs on 4/7/25 with hemoglobin 9.9, iron 17, ferritin 60.7, iron saturation 5%, TIBC 353  Stool cards negative for occult blood x 3 on 4/24/2025  Patient received Injectafer 750 mg IV on 4/14 and 4/21/2024.  Labs on 5/13/2025 with hemoglobin up to 13.3 and iron replete status with iron 111, ferritin 636, iron saturation 33%, TIBC 334.    Hemoglobin today remains normal at 13.6.  Recheck iron studies when patient returns for MD visit on 8/18/2025    *Ascending thoracic aortic aneurysm  CT scan 6/4/25 with notation of stable 4 cm enlargement of ascending thoracic aorta  This will be monitored on serial scans      Plan:  Continue palliative single agent Xeloda 1000 mg a.m., 1500 mg in the p.m. 7 days on followed by 7 days off (patient has been on single agent Xeloda since 2/7/2018).  Patient initiated current week on treatment on 6/2/2025  Patient continues on every 3-month Xgeva, due today.   Continue Eliquis 5 mg twice daily  The patient will continue frequent use of emollient cream currently 5 times daily and continue periodic triamcinolone cream 0.1% twice daily (provided by dermatology) 2 weeks on followed by 2 weeks off as prescribed  Continue Protonix 40 mg  daily  Continue ocular lubricating gel nightly per ophthalmology  Continue Provigil 200 mg daily and Cymbalta 30 mg a.m. and 60 mg p.m.  Patient continues routine follow-up with supportive oncology   Patient will continue gabapentin 300 mg a.m. and 600 mg nightly  Patient continues on tramadol 100 mg every 8 hours as needed for pain (dose increased by pain management)  Continue Percocet 7.5/325 every 8 hours as needed for pain  Patient continuing to use Imodium as needed for intermittent diarrhea  Continue Zofran 8 mg every 8 hours as needed for nausea  MD visit on 8/18/2025 (when patient returns from vacation) with CBC, CMP, stat iron panel/ferritin.  We will discuss timeframe for further follow-up scans possibly at a 4 to 6-month interval from current studies with ongoing clinical stability     Patient continuing on high risk medication requiring intensive monitoring.

## 2025-07-07 NOTE — PROGRESS NOTES
Specialty Pharmacy Patient Management Program  Oncology Reassessment     Martha Davey was referred by an their provider to the Oncology Patient Management program offered by [facility] for breast cancer. A follow-up outreach was conducted, including assessment of continued therapy appropriateness, medication adherence, and side effect incidence and management for Xeloda (capecitabine).    Changes to Insurance Coverage or Financial Support  None.     Relevant Past Medical History and Comorbidities  Relevant medical history and concomitant health conditions were discussed with the patient. The patient's chart has been reviewed for relevant past medical history and comorbid health conditions and updated as necessary.   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    Charcot-Saloni-Tooth disease     CPAP (continuous positive airway pressure) dependence     Dry eye     GERD (gastroesophageal reflux disease)     H/O Colon polyps     H/O Uterine fibroid     Heart murmur     Hyperlipidemia     Hypertension     PONV (postoperative nausea and vomiting)      Social History     Socioeconomic History    Marital status:      Spouse name: Murray    Number of children: 3    Years of education: College   Tobacco Use    Smoking status: Never    Smokeless tobacco: Never   Vaping Use    Vaping status: Never Used   Substance and Sexual Activity    Alcohol use: Yes     Comment: social    Drug use: No    Sexual activity: Defer     Problem list reviewed by Tiara Gonsales RPH on 7/7/2025 at 11:15 AM    Hospitalizations and Urgent Care Since Last Assessment  ED Visits, Admissions, or Hospitalizations: none reported  Urgent Office Visits: none reported    Allergies  Known allergies and reactions were  discussed with the patient. The patient's chart has been reviewed for allergy information and updated as necessary.   Allergies   Allergen Reactions    Hydrocodone Nausea Only    Morphine And Codeine Nausea And Vomiting     Allergies reviewed by Tiara Gonsales RPH on 7/7/2025 at 11:14 AM    Relevant Laboratory Values  Relevant laboratory values were discussed with the patient. The following specialty medication dose adjustment(s) are recommended: No dose adjustments are needed for the oral specialty medication(s) based on the labs.    Lab Results   Component Value Date    GLUCOSE 84 06/17/2025    CALCIUM 9.6 06/17/2025     06/17/2025    K 4.4 06/17/2025    CO2 25 06/17/2025     06/17/2025    BUN 23 06/17/2025    CREATININE 0.97 06/17/2025    EGFRIFAFRI 85 11/12/2021    EGFRIFNONA 56 (L) 02/21/2022    BCR 24 06/17/2025    ANIONGAP 15.0 06/06/2025     Lab Results   Component Value Date    WBC 5.79 07/07/2025    RBC 4.14 07/07/2025    HGB 13.6 07/07/2025    HCT 41.3 07/07/2025    MCV 99.8 (H) 07/07/2025    MCH 32.9 07/07/2025    MCHC 32.9 07/07/2025    RDW 18.7 (H) 07/07/2025    RDWSD 69.7 (H) 07/07/2025    MPV 10.3 07/07/2025     07/07/2025    NEUTRORELPCT 69.7 07/07/2025    LYMPHORELPCT 22.1 07/07/2025    MONORELPCT 6.4 07/07/2025    EOSRELPCT 0.5 07/07/2025    BASORELPCT 1.0 07/07/2025    AUTOIGPER 0.3 07/07/2025    NEUTROABS 4.03 07/07/2025    LYMPHSABS 1.28 07/07/2025    MONOSABS 0.37 07/07/2025    EOSABS 0.03 07/07/2025    BASOSABS 0.06 07/07/2025    AUTOIGNUM 0.02 07/07/2025    NRBC 0.0 07/07/2025       Current Medication List  This medication list has been reviewed with the patient and evaluated for any interactions or necessary modifications/recommendations, and updated to include all prescription medications, OTC medications, and supplements the patient is currently taking.  This list reflects what is contained in the patient's profile, which has also been marked as reviewed to  communicate to other providers it is the most up to date version of the patient's current medication therapy.     Current Outpatient Medications:     apixaban (Eliquis) 5 MG tablet tablet, TAKE 1 TABLET BY MOUTH EVERY 12  HOURS, Disp: 180 tablet, Rfl: 3    azelastine (ASTELIN) 0.1 % nasal spray, 2 sprays into the nostril(s) as directed by provider 2 (Two) Times a Day. Use in each nostril as directed (Patient not taking: Reported on 7/7/2025), Disp: 30 mL, Rfl: 12    calcium carbonate (OS-CARY) 600 MG tablet, Take 1 tablet by mouth 2 (Two) Times a Day With Meals., Disp: , Rfl:     capecitabine (XELODA) 500 MG chemo tablet, TAKE TWO TABLETS (1000MG) BY MOUTH EVERY MORNING AND THREE TABLETS (1500MG) EVERY EVENING FOR 7 DAYS, THEN TAKE 7 DAYS OFF. REPEAT CYCLE EVERY 14 DAYS, Disp: 70 tablet, Rfl: 5    cholecalciferol (VITAMIN D3) 1000 units tablet, Take 1 tablet by mouth Daily., Disp: , Rfl:     clonazePAM (KlonoPIN) 0.5 MG tablet, Take 1 tablet by mouth At Night As Needed for Anxiety., Disp: 15 tablet, Rfl: 0    CRANBERRY PO, Take  by mouth., Disp: , Rfl:     Cyanocobalamin ER 1000 MCG tablet controlled-release, Take 1 tablet by mouth Daily., Disp: , Rfl:     Diclofenac Sodium (VOLTAREN) 1 % gel gel, Place 4 g on the skin as directed by provider., Disp: , Rfl:     DULoxetine (Cymbalta) 30 MG capsule, Take 1 capsule by mouth Daily. Take in conjunction with 60 mg q evening., Disp: 90 capsule, Rfl: 0    DULoxetine (Cymbalta) 60 MG capsule, Take 1 capsule by mouth Daily., Disp: 90 capsule, Rfl: 0    FLONASE ALLERGY RELIEF 50 MCG/ACT nasal spray, Administer 2 sprays into the nostril(s) as directed by provider Daily., Disp: , Rfl: 11    gabapentin (NEURONTIN) 300 MG capsule, Take 1 capsule by mouth 3 (Three) Times a Day. Takes 1 tab during daytime and 2 at bedtime., Disp: 90 capsule, Rfl: 2    Hylan (SYNVISC) 16 MG/2ML solution prefilled syringe injection, Inject 2 mL into the appropriate joint as directed by provider As  Needed. (Patient not taking: Reported on 7/7/2025), Disp: , Rfl:     losartan (Cozaar) 25 MG tablet, Take 1 tablet by mouth Daily., Disp: 90 tablet, Rfl: 3    mesalamine (LIALDA) 1.2 g EC tablet, TAKE 1 TABLET BY MOUTH TWICE  DAILY, Disp: 180 tablet, Rfl: 3    metaxalone (Skelaxin) 800 MG tablet, Take 1 tablet by mouth 3 (Three) Times a Day As Needed for Muscle Spasms., Disp: 30 tablet, Rfl: 0    methenamine (HIPREX) 1 g tablet, Take 1 tablet by mouth Daily., Disp: , Rfl:     modafinil (PROVIGIL) 200 MG tablet, Take 1 tablet by mouth Daily., Disp: 30 tablet, Rfl: 2    Multiple Vitamins-Minerals (Multivitamin Gummies Womens) chewable tablet, Chew 1 tablet Daily., Disp: , Rfl:     mupirocin (BACTROBAN) 2 % ointment, Apply 1 Application topically to the appropriate area as directed As Needed., Disp: , Rfl:     ondansetron (ZOFRAN) 8 MG tablet, Take 1 tablet by mouth Every 8 (Eight) Hours As Needed for Nausea or Vomiting for up to 60 doses., Disp: 60 tablet, Rfl: 3    pantoprazole (PROTONIX) 40 MG EC tablet, TAKE 1 TABLET BY MOUTH DAILY, Disp: 90 tablet, Rfl: 3    phenazopyridine (Pyridium) 200 MG tablet, Take 1 tablet by mouth 3 (Three) Times a Day As Needed for Bladder Spasms., Disp: 15 tablet, Rfl: 3    Probiotic Product (PROBIOTIC-10 PO), Take  by mouth., Disp: , Rfl:     rosuvastatin (CRESTOR) 5 MG tablet, Take 1 tablet by mouth Daily., Disp: 90 tablet, Rfl: 3    saccharomyces boulardii (Florastor) 250 MG capsule, Take 1 capsule by mouth 2 (Two) Times a Day., Disp: 20 capsule, Rfl: 0    traMADol (ULTRAM) 50 MG tablet, Take 2 tablets by mouth Every 8 (Eight) Hours As Needed for Severe Pain., Disp: 180 tablet, Rfl: 0    triamcinolone (KENALOG) 0.1 % cream, Apply  topically to the appropriate area as directed 2 (Two) Times a Day., Disp: 15 g, Rfl: 0    urea (CARMOL) 10 % cream, Apply  topically to the appropriate area as directed 3 (Three) Times a Day., Disp: 453 g, Rfl: 1    Vibegron (Gemtesa) 75 MG tablet, Take 1  tablet by mouth Daily., Disp: 30 tablet, Rfl: 5    White Petrolatum-Mineral Oil (Wh Petrol-Mineral Oil-Lanolin) 0.1-0.1 % ointment, Apply  to eye(s) as directed by provider., Disp: , Rfl:     Medicines reviewed by Tiara Gonsales [facility] on 7/7/2025 at 11:15 AM    Drug Interactions  Assessed medication list for interactions, no significant drug interactions noted.   Advised patient to call the clinic if any new medications are started so we can assess for drug-drug interactions.  Drug-food interactions discussed: none today    Adverse Drug Reactions  Medication tolerability: Tolerating with no to minimal ADRs  Medication plan: Continue therapy with normal follow-up  Plan for ADR Management: N/A    Adherence, Self-Administration, and Current Therapy Problems  Adherence related to the patient's specialty therapy was discussed with the patient. The Adherence segment of this outreach has been reviewed and updated.     Adherence Questions  Linked Medication(s) Assessed: Capecitabine (XELODA)  On average, how many doses/injections does the patient miss per month?: 0  What are the identified reasons for non-adherence or missed doses? : no problems identified  What is the estimated medication adherence level?: %  Based on the patient/caregiver response and refill history, does this patient require an MTP to track adherence improvements?: no    Additional Barriers to Patient Self-Administration: none  Methods for Supporting Patient Self-Administration: none needed at this time  Patient has had no issues obtaining medication from pharmacy.    Open Medication Therapy Problems  No medication therapy recommendations to display    Goals of Therapy  Goals related to the patient's specialty therapy were discussed with the patient. The Patient Goals segment of this outreach has been reviewed and updated.   Goals Addressed Today        Specialty Pharmacy General Goal      Progression free survival               Quality of Life  Assessment   Quality of Life related to the patient's enrollment in the patient management program and services provided was discussed with the patient. The QOL segment of this outreach has been reviewed and updated.  Quality of Life Improvement Scale: 5-No change    Discussed aforementioned material with patient in person, face-to-face, in clinic.     Reassessment Plan & Follow-Up  1. Medication Therapy Changes: none  2. Related Plans, Therapy Recommendations, or Issues to Be Addressed: none  3. Pharmacist to perform regular assessments no more than (6) months from the previous assessment.     Attestation  Therapeutic appropriateness: Appropriate   I attest the patient was actively involved in and has agreed to the above plan of care.  If the prescribed therapy is at any point deemed not appropriate based on the current or future assessments, a consultation will be initiated with the patient's specialty care provider to determine the best course of action. The revised plan of therapy will be documented along with any required assessments and/or additional patient education provided.     Gage Gonsales, Es, [facility]  Clinical Specialty Pharmacist, Oncology  7/7/2025  11:17 EDT

## 2025-07-07 NOTE — NURSING NOTE
XGEVA administered without incident to left arm; spot bandaid to site. Discharged stable with f/u appt reviewed.

## 2025-07-08 ENCOUNTER — SPECIALTY PHARMACY (OUTPATIENT)
Dept: PHARMACY | Facility: HOSPITAL | Age: 65
End: 2025-07-08
Payer: MEDICARE

## 2025-07-08 LAB
THYROGLOB AB SERPL-ACNC: 1.5 IU/ML (ref 0–0.9)
THYROPEROXIDASE AB SERPL-ACNC: <9 IU/ML (ref 0–34)

## 2025-07-08 NOTE — PROGRESS NOTES
Specialty Pharmacy Note: Xeloda (capecitabine)    Martha Davey is a 64 y.o. female with breast cancer was seen 7/7/25 by APRN. Per provider dictation, no changes to oral oncology regimen Xeloda (capecitabine).  Labs Review: The CMP and CBC from 7/7/25 have been reviewed. No dose adjustments are needed for the oral specialty medication(s) based on the labs.    Specialty pharmacy will continue to follow patient.    Lubna Gupta, PharmD, BCPS  7/8/2025  07:41 EDT

## 2025-07-11 ENCOUNTER — RESULTS FOLLOW-UP (OUTPATIENT)
Dept: INTERNAL MEDICINE | Facility: CLINIC | Age: 65
End: 2025-07-11
Payer: MEDICARE

## 2025-07-15 NOTE — TELEPHONE ENCOUNTER
Patient does have antibodies to her thyroid. May need thyroid supplementation. Would repeat thyroid levels before her scheduled visit in September.   J   pt informed

## 2025-07-18 ENCOUNTER — APPOINTMENT (OUTPATIENT)
Dept: GENERAL RADIOLOGY | Facility: HOSPITAL | Age: 65
End: 2025-07-18
Payer: MEDICARE

## 2025-07-18 ENCOUNTER — DOCUMENTATION (OUTPATIENT)
Dept: INTERNAL MEDICINE | Facility: CLINIC | Age: 65
End: 2025-07-18
Payer: MEDICARE

## 2025-07-18 ENCOUNTER — TELEPHONE (OUTPATIENT)
Dept: INTERNAL MEDICINE | Facility: CLINIC | Age: 65
End: 2025-07-18
Payer: MEDICARE

## 2025-07-18 ENCOUNTER — HOSPITAL ENCOUNTER (OUTPATIENT)
Facility: HOSPITAL | Age: 65
Discharge: HOME | End: 2025-07-18
Attending: EMERGENCY MEDICINE
Payer: MEDICARE

## 2025-07-18 VITALS
HEIGHT: 61 IN | WEIGHT: 171 LBS | HEART RATE: 83 BPM | RESPIRATION RATE: 16 BRPM | SYSTOLIC BLOOD PRESSURE: 130 MMHG | DIASTOLIC BLOOD PRESSURE: 65 MMHG | OXYGEN SATURATION: 96 % | TEMPERATURE: 98.1 F | BODY MASS INDEX: 32.28 KG/M2

## 2025-07-18 DIAGNOSIS — R53.83 MALAISE AND FATIGUE: ICD-10-CM

## 2025-07-18 DIAGNOSIS — R07.9 CHEST PAIN, UNSPECIFIED TYPE: Primary | ICD-10-CM

## 2025-07-18 DIAGNOSIS — R53.81 MALAISE AND FATIGUE: ICD-10-CM

## 2025-07-18 LAB
ALBUMIN SERPL-MCNC: 4 G/DL (ref 3.5–5.2)
ALBUMIN/GLOB SERPL: 1.9 G/DL
ALP SERPL-CCNC: 71 U/L (ref 39–117)
ALT SERPL W P-5'-P-CCNC: 16 U/L (ref 1–33)
ANION GAP SERPL CALCULATED.3IONS-SCNC: 9.1 MMOL/L (ref 5–15)
AST SERPL-CCNC: 25 U/L (ref 1–32)
BASOPHILS # BLD AUTO: 0.06 10*3/MM3 (ref 0–0.2)
BASOPHILS NFR BLD AUTO: 0.9 % (ref 0–1.5)
BILIRUB SERPL-MCNC: 0.3 MG/DL (ref 0–1.2)
BUN SERPL-MCNC: 19.3 MG/DL (ref 8–23)
BUN/CREAT SERPL: 23.5 (ref 7–25)
CALCIUM SPEC-SCNC: 9.3 MG/DL (ref 8.6–10.5)
CHLORIDE SERPL-SCNC: 100 MMOL/L (ref 98–107)
CO2 SERPL-SCNC: 29.9 MMOL/L (ref 22–29)
CREAT SERPL-MCNC: 0.82 MG/DL (ref 0.57–1)
DEPRECATED RDW RBC AUTO: 69.6 FL (ref 37–54)
EGFRCR SERPLBLD CKD-EPI 2021: 80 ML/MIN/1.73
EOSINOPHIL # BLD AUTO: 0.13 10*3/MM3 (ref 0–0.4)
EOSINOPHIL NFR BLD AUTO: 1.9 % (ref 0.3–6.2)
ERYTHROCYTE [DISTWIDTH] IN BLOOD BY AUTOMATED COUNT: 18 % (ref 12.3–15.4)
GEN 5 1HR TROPONIN T REFLEX: 10 NG/L
GLOBULIN UR ELPH-MCNC: 2.1 GM/DL
GLUCOSE SERPL-MCNC: 110 MG/DL (ref 65–99)
HCT VFR BLD AUTO: 40.7 % (ref 34–46.6)
HETEROPH AB SER QL LA: NEGATIVE
HGB BLD-MCNC: 13.1 G/DL (ref 12–15.9)
HOLD SPECIMEN: NORMAL
HOLD SPECIMEN: NORMAL
IMM GRANULOCYTES # BLD AUTO: 0 10*3/MM3 (ref 0–0.05)
IMM GRANULOCYTES NFR BLD AUTO: 0 % (ref 0–0.5)
INR PPP: 1.3
LYMPHOCYTES # BLD AUTO: 1.1 10*3/MM3 (ref 0.7–3.1)
LYMPHOCYTES NFR BLD AUTO: 15.9 % (ref 19.6–45.3)
MCH RBC QN AUTO: 33.2 PG (ref 26.6–33)
MCHC RBC AUTO-ENTMCNC: 32.2 G/DL (ref 31.5–35.7)
MCV RBC AUTO: 103 FL (ref 79–97)
MONOCYTES # BLD AUTO: 0.45 10*3/MM3 (ref 0.1–0.9)
MONOCYTES NFR BLD AUTO: 6.5 % (ref 5–12)
NEUTROPHILS NFR BLD AUTO: 5.18 10*3/MM3 (ref 1.7–7)
NEUTROPHILS NFR BLD AUTO: 74.8 % (ref 42.7–76)
PLATELET # BLD AUTO: 220 10*3/MM3 (ref 140–450)
PMV BLD AUTO: 10.4 FL (ref 6–12)
POTASSIUM SERPL-SCNC: 4.1 MMOL/L (ref 3.5–5.2)
PROT SERPL-MCNC: 6.1 G/DL (ref 6–8.5)
PROTHROMBIN TIME: 15.4 SECONDS (ref 11–15)
QT INTERVAL: 356 MS
QTC INTERVAL: 423 MS
RBC # BLD AUTO: 3.95 10*6/MM3 (ref 3.77–5.28)
SODIUM SERPL-SCNC: 139 MMOL/L (ref 136–145)
TROPONIN T NUMERIC DELTA: -1 NG/L
TROPONIN T SERPL HS-MCNC: 11 NG/L
WBC NRBC COR # BLD AUTO: 6.92 10*3/MM3 (ref 3.4–10.8)
WHOLE BLOOD HOLD SPECIMEN: NORMAL

## 2025-07-18 PROCEDURE — 84484 ASSAY OF TROPONIN QUANT: CPT | Performed by: EMERGENCY MEDICINE

## 2025-07-18 PROCEDURE — 85610 PROTHROMBIN TIME: CPT

## 2025-07-18 PROCEDURE — 93005 ELECTROCARDIOGRAM TRACING: CPT | Performed by: EMERGENCY MEDICINE

## 2025-07-18 PROCEDURE — 86308 HETEROPHILE ANTIBODY SCREEN: CPT | Performed by: PHYSICIAN ASSISTANT

## 2025-07-18 PROCEDURE — G0463 HOSPITAL OUTPT CLINIC VISIT: HCPCS | Performed by: PHYSICIAN ASSISTANT

## 2025-07-18 PROCEDURE — 93010 ELECTROCARDIOGRAM REPORT: CPT | Performed by: INTERNAL MEDICINE

## 2025-07-18 PROCEDURE — 36415 COLL VENOUS BLD VENIPUNCTURE: CPT

## 2025-07-18 PROCEDURE — 80053 COMPREHEN METABOLIC PANEL: CPT | Performed by: EMERGENCY MEDICINE

## 2025-07-18 PROCEDURE — 71045 X-RAY EXAM CHEST 1 VIEW: CPT

## 2025-07-18 PROCEDURE — 99283 EMERGENCY DEPT VISIT LOW MDM: CPT | Performed by: PHYSICIAN ASSISTANT

## 2025-07-18 PROCEDURE — 85025 COMPLETE CBC W/AUTO DIFF WBC: CPT

## 2025-07-18 PROCEDURE — 99284 EMERGENCY DEPT VISIT MOD MDM: CPT

## 2025-07-18 RX ORDER — SODIUM CHLORIDE 0.9 % (FLUSH) 0.9 %
10 SYRINGE (ML) INJECTION AS NEEDED
Status: DISCONTINUED | OUTPATIENT
Start: 2025-07-18 | End: 2025-07-18 | Stop reason: HOSPADM

## 2025-07-18 NOTE — TELEPHONE ENCOUNTER
Patient and stated that she would like to speak with Sierra. She states that she is not feeling good and wants to speak with Sierra    Best call back # [phone]

## 2025-07-18 NOTE — PROGRESS NOTES
Patient called office w chest heaviness and fatigue. She was advised to go to ER but declined noting she felt it was related to thyroid. Reviewed labs from 11 days ago and patient does not have a lab finding to explain her symptoms including thyroid which is normal. Called and lm for patient to go to ER givne chest heaviness and fatigue as advised earlier.   PK

## 2025-07-18 NOTE — TELEPHONE ENCOUNTER
I called and lm w patient. Her thyroid level is normal 11 days ago and does not usually vary that quickly. No indication for her symtposm on recent lab testing. She should go to ER for cardiac eval and not wait.   JW

## 2025-07-19 NOTE — [facility] PROVIDER NOTE
Subjective   History of Present Illness  Patient is a 64 female that presents the emergency room with an intermittent chest pain/tightness has been going on for a month.  Says it seems very random but seems worse in the morning and in the evenings.  Nothing seems to make it better or worse.  She is not dyspneic.    She that she has had a lot of issues with fatigue for the last couple months and has been seen by her doctor but they have not figured out why.  She does not know of any insect bites including tick bites.  She has not been sick in the last few months.    She has a history of breast cancer in 2010 and then had metastatic cancer in 2017.  She is on an oral chemo agent that she has been on long-term.  No change in medication.  She has a history of high blood pressure and high cholesterol.  She does not have thyroid disease but was told that she has a elevation in a thyroid test.      Review of Systems   Constitutional:  Positive for fatigue. Negative for fever.   HENT: Negative.     Respiratory: Negative.     Cardiovascular:  Positive for chest pain. Negative for palpitations and leg swelling.   Gastrointestinal: Negative.    Genitourinary: Negative.    Musculoskeletal: Negative.    Skin: Negative.    Neurological: Negative.    Psychiatric/Behavioral: Negative.     All other systems reviewed and are negative.      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    Charcot-Saloni-Tooth disease     CPAP (continuous positive airway pressure) dependence     Dry eye     GERD (gastroesophageal reflux disease)     H/O Colon polyps     H/O Uterine fibroid     Heart murmur     Hyperlipidemia     Hypertension     PONV (postoperative nausea and vomiting)        Allergies   Allergen Reactions    Hydrocodone Nausea Only     Morphine And Codeine Nausea And Vomiting       Past Surgical History:   Procedure Laterality Date    BLADDER SURGERY      Bladder lift    BREAST RECONSTRUCTION, BREAST TISSUE EXPANDER INSERTION Right 2010    BREAST SURGERY Right 2010    Mastectomy     SECTION  ,     CHOLECYSTECTOMY      COLONOSCOPY      COLONOSCOPY N/A 2022    Procedure: COLONOSCOPY FOR SCREENING - unable to do procedure.;  Surgeon: Theodore Najera MD;  Location: [facility] MAIN OR;  Service: Gastroenterology;  Laterality: N/A;  poor prep, procedure aborted    COLONOSCOPY N/A 2023    Procedure: COLONOSCOPY;  Surgeon: Theodore Najera MD;  Location: [facility] MAIN OR;  Service: Gastroenterology;  Laterality: N/A;  diverticulosis    ENDOSCOPY N/A 2022    Procedure: ESOPHAGOGASTRODUODENOSCOPY;  Surgeon: Theodore Najera MD;  Location: [facility] ENDOSCOPY;  Service: Gastroenterology;  Laterality: N/A;  PRE: GERD  POST: HIATAL HERNIA, GASTRITIS    HERNIA REPAIR  2015    Ventral    HYSTERECTOMY      Partial    KNEE SURGERY Right     LEG SURGERY Left     Broken    MASTECTOMY Right     WV TX TIBL SHFT FX IMED IMPLT W/WO SCREWS&/CERCLA Left 2017    Procedure: TIBIA INTRAMEDULLARY NAIL/DON INSERTION;  Surgeon: Romero Shanks MD;  Location: [facility] MAIN OR;  Service: Orthopedics    THORACIC DECOMPRESSION POSTERIOR FUSION WITH INSTRUMENTATION N/A 2017    Procedure: T6 costotransversectomy and decompression with T3 to  T9 posterior spinal fusion with instrumentation with Jennifer Gonzlaez and Nael No Cellsaver;  Surgeon: Quan Nayak MD;  Location: [facility] MAIN OR;  Service:        Family History   Problem Relation Age of Onset    Colon polyps Mother     Colon cancer Mother     Heart disease Mother     Hyperlipidemia Mother     Hypertension Mother     Diabetes Father     Charcot-Saloni-Tooth disease Father     Heart disease Father     Hypertension Father     Heart failure Father     Colon polyps Sister      Diabetes Sister     Heart disease Sister     Hypertension Sister     Colon polyps Brother     Depression Brother     Heart disease Maternal Aunt     Scoliosis Maternal Aunt     Heart disease Maternal Uncle     Diabetes Maternal Grandmother     Heart disease Maternal Grandmother     Hypertension Maternal Grandmother     Uterine cancer Maternal Grandmother     Heart disease Maternal Grandfather     Crohn's disease Neg Hx     Irritable bowel syndrome Neg Hx     Ulcerative colitis Neg Hx        Social History     Socioeconomic History    Marital status:      Spouse name: Murray    Number of children: 3    Years of education: College   Tobacco Use    Smoking status: Never    Smokeless tobacco: Never   Vaping Use    Vaping status: Never Used   Substance and Sexual Activity    Alcohol use: Yes     Comment: social    Drug use: No    Sexual activity: Defer           Objective   Physical Exam  Vitals and nursing note reviewed.   Constitutional:       Appearance: Normal appearance.   Eyes:      Conjunctiva/sclera: Conjunctivae normal.   Cardiovascular:      Rate and Rhythm: Normal rate and regular rhythm.      Pulses: Normal pulses.      Heart sounds: Normal heart sounds.   Pulmonary:      Effort: Pulmonary effort is normal.      Breath sounds: Normal breath sounds.   Abdominal:      General: Bowel sounds are normal. There is no distension.      Palpations: Abdomen is soft.      Tenderness: There is no abdominal tenderness. There is no right CVA tenderness, left CVA tenderness or guarding.   Musculoskeletal:         General: Normal range of motion.      Cervical back: Normal range of motion and neck supple.   Skin:     General: Skin is warm and dry.   Neurological:      Mental Status: She is alert.      Gait: Gait normal.   Psychiatric:         Mood and Affect: Mood normal.         Behavior: Behavior normal.         ECG 12 Lead      Date/Time: 7/18/2025 4:36 PM    Performed by: Lizett Rhodes PA-C  Authorized by:  Shyam Ceron MD  Interpreted by ED physician  Rhythm: sinus rhythm  BPM: 85  QRS axis: normal  Conduction: conduction normal  Clinical impression: non-specific ECG  Comments: QTc 423, nonspecific T wave changes               ED Course                HEART Score: 2                            Medical Decision Making  At presentation patient is fatigued.  Vitals are stable.  I reviewed her thyroid test and she always has normal thyroid function test but had an elevated thyroglobulin antibody recently.  She says she has a follow-up in her doctor's office in September to have that retested.    Her chief complaint today is chest pain this been going on off and on for a month.  It is worse in the morning and at night.  It is very random.  2 set troponin reassuring.  EKG negative for ST elevation.  Chest x-ray significant for hiatal hernia.  She does not have risk factors for PE and she is not dyspneic.    Her blood work is otherwise reassuring today.  She is not anemic but does have an macrocytic profile.  Discussed with Dr. Ceron and we suggested patient see her doctor for B12 injections.  Discussed this with the patient and she is agreeable.    Do not have an answer for patient's fatigue and patient has already made an appointment to see her doctor on Monday.  If anything changes in the meantime I told her return to emergency room be happy to see her.    ---------------------------               07/18/25 2039         ---------------------------   BP:                         Pulse:                      Resp:                       Temp:   98.1 °F (36.7 °C)   SpO2:                      ---------------------------    Labs Reviewed  COMPREHENSIVE METABOLIC PANEL - Abnormal; Notable for the following components:     Glucose                       110 (*)                CO2                           29.9 (*)            All other components within normal limits          Narrative: GFR Categories in Chronic Kidney Disease (CKD)                                              GFR Category          GFR (mL/min/1.73)    Interpretation                  G1                    90 or greater        Normal or high (1)                  G2                    60-89                Mild decrease (1)                  G3a                   45-59                Mild to moderate decrease                  G3b                   30-44                Moderate to severe decrease                  G4                    15-29                Severe decrease                  G5                    14 or less           Kidney failure                                    (1)In the absence of evidence of kidney disease, neither GFR category G1 or G2 fulfill the criteria for CKD.                                    eGFR calculation 2021 CKD-EPI creatinine equation, which does not include race as a factor  CBC WITH AUTO DIFFERENTIAL - Abnormal; Notable for the following components:     MCV                           103.0 (*)               MCH                           33.2 (*)               RDW                           18.0 (*)               RDW-SD                        69.6 (*)               Lymphocyte %                  15.9 (*)            All other components within normal limits  LAB PROTIME-INR, FINGERSTICK - Abnormal; Notable for the following components:     Protime                       15.4 (*)            All other components within normal limits  TROPONIN - Normal         Narrative: High Sensitive Troponin T Reference Range:                  <14.0 ng/L- Negative Female for AMI                  <22.0 ng/L- Negative Male for AMI                  >=14 - Abnormal Female indicating possible myocardial injury.                  >=22 - Abnormal Male indicating possible myocardial injury.                   Clinicians would have to utilize clinical acumen, EKG, Troponin, and serial changes to determine if it is an Acute  Myocardial Infarction or myocardial injury due to an underlying chronic condition.                                       MONONUCLEOSIS SCREEN - Normal  HIGH SENSITIVITIY TROPONIN T 1HR - Normal         Narrative: High Sensitive Troponin T Reference Range:                  <14.0 ng/L- Negative Female for AMI                  <22.0 ng/L- Negative Male for AMI                  >=14 - Abnormal Female indicating possible myocardial injury.                  >=22 - Abnormal Male indicating possible myocardial injury.                   Clinicians would have to utilize clinical acumen, EKG, Troponin, and serial changes to determine if it is an Acute Myocardial Infarction or myocardial injury due to an underlying chronic condition.                                       RAINBOW DRAW         Narrative: The following orders were created for panel order Eek Draw.                  Procedure                               Abnormality         Status                                     ---------                               -----------         ------                                     Green Top (Gel)[[id number]]                                  Final result                               Lavender Top[[id number]]                                     Final result                               Gold Top - SST[[id number]]                                   Final result                               Light Blue Top[[id number]]                                                                              Green Top (Gel)[[id number]]                                                                                               Please view results for these tests on the individual orders.  CBC AND DIFFERENTIAL         Narrative: The following orders were created for panel order CBC & Differential.                  Procedure                               Abnormality         Status                                     ---------                                -----------         ------                                     CBC Auto Differential[[id number]]        Abnormal            Final result                                                 Please view results for these tests on the individual orders.  GREEN TOP  LAVENDER TOP  GOLD TOP - SST    XR Chest 1 View  Result Date: 7/18/2025  1. No acute process. 2. Hiatal hernia     This report was finalized on 7/18/2025 5:18 PM by Dr. Davion Neal M.D on Workstation: [id number]          Problems Addressed:  Chest pain, unspecified type: complicated acute illness or injury  Malaise and fatigue: complicated acute illness or injury    Amount and/or Complexity of Data Reviewed  Labs: ordered.  Radiology: ordered.  ECG/medicine tests: ordered and independent interpretation performed.    Risk  Prescription drug management.        Final diagnoses:   Malaise and fatigue   Chest pain, unspecified type       ED Disposition  ED Disposition       ED Disposition   Discharge    Condition   Stable    Comment   --               Jazmine Chanel MD  [address] [address]  [phone]    In 1 week           Medication List      No changes were made to your prescriptions during this visit.

## 2025-07-21 ENCOUNTER — OFFICE VISIT (OUTPATIENT)
Dept: INTERNAL MEDICINE | Facility: CLINIC | Age: 65
End: 2025-07-21
Payer: MEDICARE

## 2025-07-21 ENCOUNTER — TELEPHONE (OUTPATIENT)
Dept: ONCOLOGY | Facility: CLINIC | Age: 65
End: 2025-07-21
Payer: MEDICARE

## 2025-07-21 VITALS
SYSTOLIC BLOOD PRESSURE: 128 MMHG | OXYGEN SATURATION: 96 % | HEART RATE: 105 BPM | BODY MASS INDEX: 33.38 KG/M2 | WEIGHT: 170 LBS | DIASTOLIC BLOOD PRESSURE: 74 MMHG | HEIGHT: 60 IN

## 2025-07-21 DIAGNOSIS — R53.83 FATIGUE, UNSPECIFIED TYPE: Primary | ICD-10-CM

## 2025-07-21 DIAGNOSIS — G47.33 OSA (OBSTRUCTIVE SLEEP APNEA): ICD-10-CM

## 2025-07-21 DIAGNOSIS — D75.89 MACROCYTOSIS: ICD-10-CM

## 2025-07-21 DIAGNOSIS — R79.9 ABNORMAL FINDING OF BLOOD CHEMISTRY, UNSPECIFIED: ICD-10-CM

## 2025-07-21 DIAGNOSIS — E55.9 VITAMIN D DEFICIENCY: ICD-10-CM

## 2025-07-21 DIAGNOSIS — I35.1 AORTIC VALVE REGURGITATION, NONRHEUMATIC: ICD-10-CM

## 2025-07-21 DIAGNOSIS — F32.A DEPRESSION, UNSPECIFIED DEPRESSION TYPE: ICD-10-CM

## 2025-07-21 PROCEDURE — 96372 THER/PROPH/DIAG INJ SC/IM: CPT | Performed by: INTERNAL MEDICINE

## 2025-07-21 PROCEDURE — 1125F AMNT PAIN NOTED PAIN PRSNT: CPT | Performed by: INTERNAL MEDICINE

## 2025-07-21 PROCEDURE — 3074F SYST BP LT 130 MM HG: CPT | Performed by: INTERNAL MEDICINE

## 2025-07-21 PROCEDURE — 99214 OFFICE O/P EST MOD 30 MIN: CPT | Performed by: INTERNAL MEDICINE

## 2025-07-21 PROCEDURE — 3078F DIAST BP <80 MM HG: CPT | Performed by: INTERNAL MEDICINE

## 2025-07-21 RX ORDER — ARMODAFINIL 250 MG/1
250 TABLET ORAL DAILY
Qty: 30 TABLET | Refills: 5 | Status: SHIPPED | OUTPATIENT
Start: 2025-07-21 | End: 2026-01-17

## 2025-07-21 RX ORDER — ACETAMINOPHEN 160 MG
2000 TABLET,DISINTEGRATING ORAL DAILY
Qty: 90 CAPSULE | Refills: 3 | Status: SHIPPED | OUTPATIENT
Start: 2025-07-21

## 2025-07-21 RX ORDER — CYANOCOBALAMIN 1000 UG/ML
1000 INJECTION, SOLUTION INTRAMUSCULAR; SUBCUTANEOUS
Status: SHIPPED | OUTPATIENT
Start: 2025-07-21

## 2025-07-21 RX ADMIN — CYANOCOBALAMIN 1000 MCG: 1000 INJECTION, SOLUTION INTRAMUSCULAR; SUBCUTANEOUS at 14:37

## 2025-07-21 NOTE — TELEPHONE ENCOUNTER
Provider: Santi  Caller: patient  Relationship to Patient: self  Call Back Phone Number: [phone]  Reason for Call: patient wants to give information about an ER visit

## 2025-07-21 NOTE — PROGRESS NOTES
"Chief Complaint   Patient presents with    er follow up    Anemia       Anemia       Martha Davey is a 64 y.o. female presents for acute care. Patient is feeling fatigued. To ER and found to have elevated MCV but essentially normal labs beyond this. Patient has known MARIA M. She notes that is resting from 10-11 pm until 11 am. She is wearing CPAP. Has not had recent testing to ensure. She notes that \"quality of life is not very good\". She has B shoulder pain. No benefit with injections- took off the edge for a couple of days \"then nothing\". She is going on a trip to  [facility] and is concerned she may not have energy for this trip.   Taking 2 tabs gabapentin every evening. Also taking 1 tramadol at night.     The following portions of the patient's history were reviewed and updated as appropriate: allergies, current medications, past family history, past medical history, past social history, past surgical history and problem list.  Current Outpatient Medications on File Prior to Visit   Medication Sig Dispense Refill    apixaban (Eliquis) 5 MG tablet tablet TAKE 1 TABLET BY MOUTH EVERY 12  HOURS 180 tablet 3    azelastine (ASTELIN) 0.1 % nasal spray 2 sprays into the nostril(s) as directed by provider 2 (Two) Times a Day. Use in each nostril as directed 30 mL 12    calcium carbonate (OS-CARY) 600 MG tablet Take 1 tablet by mouth 2 (Two) Times a Day With Meals.      capecitabine (XELODA) 500 MG chemo tablet TAKE TWO TABLETS (1000MG) BY MOUTH EVERY MORNING AND THREE TABLETS (1500MG) EVERY EVENING FOR 7 DAYS, THEN TAKE 7 DAYS OFF. REPEAT CYCLE EVERY 14 DAYS 70 tablet 5    clonazePAM (KlonoPIN) 0.5 MG tablet Take 1 tablet by mouth At Night As Needed for Anxiety. 15 tablet 0    CRANBERRY PO Take  by mouth.      Cyanocobalamin ER 1000 MCG tablet controlled-release Take 1 tablet by mouth Daily.      Diclofenac Sodium (VOLTAREN) 1 % gel gel Place 4 g on the skin as directed by provider.      DULoxetine (Cymbalta) 30 MG " capsule Take 1 capsule by mouth Daily. Take in conjunction with 60 mg q evening. 90 capsule 0    DULoxetine (Cymbalta) 60 MG capsule Take 1 capsule by mouth Daily. 90 capsule 0    FLONASE ALLERGY RELIEF 50 MCG/ACT nasal spray Administer 2 sprays into the nostril(s) as directed by provider Daily.  11    gabapentin (NEURONTIN) 300 MG capsule Take 1 capsule by mouth 3 (Three) Times a Day. Takes 1 tab during daytime and 2 at bedtime. 90 capsule 2    Hylan (SYNVISC) 16 MG/2ML solution prefilled syringe injection Inject 2 mL into the appropriate joint as directed by provider As Needed.      losartan (Cozaar) 25 MG tablet Take 1 tablet by mouth Daily. 90 tablet 3    mesalamine (LIALDA) 1.2 g EC tablet TAKE 1 TABLET BY MOUTH TWICE  DAILY 180 tablet 3    metaxalone (Skelaxin) 800 MG tablet Take 1 tablet by mouth 3 (Three) Times a Day As Needed for Muscle Spasms. 30 tablet 0    methenamine (HIPREX) 1 g tablet Take 1 tablet by mouth Daily.      Multiple Vitamins-Minerals (Multivitamin Gummies Womens) chewable tablet Chew 1 tablet Daily.      mupirocin (BACTROBAN) 2 % ointment Apply 1 Application topically to the appropriate area as directed As Needed.      ondansetron (ZOFRAN) 8 MG tablet Take 1 tablet by mouth Every 8 (Eight) Hours As Needed for Nausea or Vomiting for up to 60 doses. 60 tablet 3    pantoprazole (PROTONIX) 40 MG EC tablet TAKE 1 TABLET BY MOUTH DAILY 90 tablet 3    phenazopyridine (Pyridium) 200 MG tablet Take 1 tablet by mouth 3 (Three) Times a Day As Needed for Bladder Spasms. 15 tablet 3    Probiotic Product (PROBIOTIC-10 PO) Take  by mouth.      rosuvastatin (CRESTOR) 5 MG tablet Take 1 tablet by mouth Daily. 90 tablet 3    saccharomyces boulardii (Florastor) 250 MG capsule Take 1 capsule by mouth 2 (Two) Times a Day. 20 capsule 0    traMADol (ULTRAM) 50 MG tablet Take 2 tablets by mouth Every 8 (Eight) Hours As Needed for Severe Pain. 180 tablet 0    triamcinolone (KENALOG) 0.1 % cream Apply  topically to  the appropriate area as directed 2 (Two) Times a Day. 15 g 0    urea (CARMOL) 10 % cream Apply  topically to the appropriate area as directed 3 (Three) Times a Day. 453 g 1    Vibegron (Gemtesa) 75 MG tablet Take 1 tablet by mouth Daily. 30 tablet 5    White Petrolatum-Mineral Oil (Wh Petrol-Mineral Oil-Lanolin) 0.1-0.1 % ointment Apply  to eye(s) as directed by provider.      [DISCONTINUED] cholecalciferol (VITAMIN D3) 1000 units tablet Take 1 tablet by mouth Daily.      [DISCONTINUED] modafinil (PROVIGIL) 200 MG tablet Take 1 tablet by mouth Daily. 30 tablet 2     No current facility-administered medications on file prior to visit.     Review of Systems   Constitutional:  Positive for fatigue.   HENT: Negative.     Eyes: Negative.    Respiratory:  Positive for shortness of breath.    Cardiovascular:  Positive for chest pain.   Gastrointestinal: Negative.    Endocrine: Negative.    Genitourinary: Negative.    Musculoskeletal:  Positive for arthralgias.        B shoulder pain   Skin: Negative.    Allergic/Immunologic: Negative.    Neurological: Negative.    Hematological: Negative.    Psychiatric/Behavioral:          Depressed mood related to feeling poorly       Objective   Physical Exam  Vitals and nursing note reviewed.   Constitutional:       Comments: Chronic ill, tearful, no acute distress   HENT:      Head: Normocephalic and atraumatic.      Right Ear: Tympanic membrane normal.      Left Ear: Tympanic membrane normal.      Nose: Nose normal.      Mouth/Throat:      Mouth: Mucous membranes are moist.   Eyes:      Extraocular Movements: Extraocular movements intact.      Pupils: Pupils are equal, round, and reactive to light.   Cardiovascular:      Rate and Rhythm: Regular rhythm. Tachycardia present.      Pulses: Normal pulses.      Heart sounds: Murmur heard.   Pulmonary:      Effort: Pulmonary effort is normal.      Breath sounds: Normal breath sounds.   Abdominal:      General: Abdomen is flat.       "Palpations: Abdomen is soft.   Musculoskeletal:      Cervical back: Normal range of motion.      Comments: baseline   Skin:     General: Skin is warm and dry.   Neurological:      General: No focal deficit present.      Mental Status: She is alert and oriented to person, place, and time.   Psychiatric:         Mood and Affect: Mood normal.         Behavior: Behavior normal.         Thought Content: Thought content normal.         Judgment: Judgment normal.          /74   Pulse 105   Ht 152.4 cm (60\")   Wt 77.1 kg (170 lb)   LMP  (LMP Unknown)   SpO2 96%   BMI 33.20 kg/m²     Assessment & Plan   Diagnoses and all orders for this visit:    Fatigue, unspecified type  -     CBC & Differential  -     Folate  -     Vitamin B12  -     Vitamin D,25-Hydroxy  -     Iron Profile w/o Ferritin  -     Ambulatory Referral to Cardiology for Valvular Disease  -     cyanocobalamin injection 1,000 mcg    Macrocytosis  -     CBC & Differential  -     Folate  -     Vitamin B12  -     Vitamin D,25-Hydroxy  -     Iron Profile w/o Ferritin    Aortic valve regurgitation, nonrheumatic  -     CBC & Differential  -     Folate  -     Vitamin B12  -     Vitamin D,25-Hydroxy  -     Iron Profile w/o Ferritin  -     Adult Transthoracic Echo Complete W/ Cont if Necessary Per Protocol; Future  -     Ambulatory Referral to Cardiology for Valvular Disease    Depression, unspecified depression type  -     CBC & Differential  -     Folate  -     Vitamin B12  -     Vitamin D,25-Hydroxy  -     Iron Profile w/o Ferritin    MARIA M (obstructive sleep apnea)  -     Armodafinil 250 MG tablet; Take 1 tablet by mouth Daily for 180 days.  -     CBC & Differential  -     Folate  -     Vitamin B12  -     Vitamin D,25-Hydroxy  -     Iron Profile w/o Ferritin    Vitamin D deficiency  -     Vitamin D,25-Hydroxy    Abnormal finding of blood chemistry, unspecified  -     Iron Profile w/o Ferritin    Other orders  -     Cholecalciferol (Vitamin D3) 50 MCG (2000 " UT) capsule; Take 1 capsule by mouth Daily.      Patient w fatigue. Likely multifactorial. She does have breast CA on long term chemo. She is to have listed labs tested. Will give a vitamin b12 injection today. Will test vitamin d levels as well. To start vit d 2000 iu daily. Will switch from modafinil to armodafinil. Move gabapentin to 300 at night then additional in day if needed (in place of 2 at night). F/u w sleep specialist to ensure she has appropriate mask fit and setting. She will get updated echo given known moderate aortic valve regurge.   Encouraged chair activity or water. Close follow up.

## 2025-07-22 ENCOUNTER — TELEPHONE (OUTPATIENT)
Dept: ONCOLOGY | Facility: CLINIC | Age: 65
End: 2025-07-22
Payer: MEDICARE

## 2025-07-22 ENCOUNTER — RESULTS FOLLOW-UP (OUTPATIENT)
Dept: INTERNAL MEDICINE | Facility: CLINIC | Age: 65
End: 2025-07-22
Payer: MEDICARE

## 2025-07-22 LAB
25(OH)D3+25(OH)D2 SERPL-MCNC: 47.9 NG/ML (ref 30–100)
BASOPHILS # BLD AUTO: 0.1 X10E3/UL (ref 0–0.2)
BASOPHILS NFR BLD AUTO: 1 %
EOSINOPHIL # BLD AUTO: 0.1 X10E3/UL (ref 0–0.4)
EOSINOPHIL NFR BLD AUTO: 1 %
ERYTHROCYTE [DISTWIDTH] IN BLOOD BY AUTOMATED COUNT: 17.2 % (ref 11.7–15.4)
FOLATE SERPL-MCNC: >20 NG/ML
HCT VFR BLD AUTO: 42.3 % (ref 34–46.6)
HGB BLD-MCNC: 13.6 G/DL (ref 11.1–15.9)
IMM GRANULOCYTES # BLD AUTO: 0 X10E3/UL (ref 0–0.1)
IMM GRANULOCYTES NFR BLD AUTO: 0 %
IRON SATN MFR SERPL: 57 % (ref 15–55)
IRON SERPL-MCNC: 186 UG/DL (ref 27–139)
LYMPHOCYTES # BLD AUTO: 1.3 X10E3/UL (ref 0.7–3.1)
LYMPHOCYTES NFR BLD AUTO: 19 %
MCH RBC QN AUTO: 32.5 PG (ref 26.6–33)
MCHC RBC AUTO-ENTMCNC: 32.2 G/DL (ref 31.5–35.7)
MCV RBC AUTO: 101 FL (ref 79–97)
MONOCYTES # BLD AUTO: 0.4 X10E3/UL (ref 0.1–0.9)
MONOCYTES NFR BLD AUTO: 6 %
NEUTROPHILS # BLD AUTO: 5.1 X10E3/UL (ref 1.4–7)
NEUTROPHILS NFR BLD AUTO: 73 %
PLATELET # BLD AUTO: 272 X10E3/UL (ref 150–450)
RBC # BLD AUTO: 4.18 X10E6/UL (ref 3.77–5.28)
TIBC SERPL-MCNC: 324 UG/DL (ref 250–450)
UIBC SERPL-MCNC: 138 UG/DL (ref 118–369)
VIT B12 SERPL-MCNC: >2000 PG/ML (ref 232–1245)
WBC # BLD AUTO: 7.1 X10E3/UL (ref 3.4–10.8)

## 2025-07-22 NOTE — TELEPHONE ENCOUNTER
Provider: Santi  Caller: patient  Relationship to Patient: self  Call Back Phone Number: [phone]  Reason for Call: patient says no one called her back yesterday and she needs to talk to the nurse

## 2025-07-22 NOTE — TELEPHONE ENCOUNTER
Spoke with patient, who wanted to relay details of emergency department visit and follow up with PCP to Dr. Hancock. Patient stated she reported to ED this past Friday 7/18/25 with complaints of chest pain and ongoing fatigue. EKG, chest x-ray and labs unremarkable, with the exception of elevated MCV. Question was raised about possible anemia. Chest pain resolved. Patient saw her PCP on Monday 7/21/25. Anemia workup negative. She did receive a B12 injection. PCP ordered echocardiogram to be performed, as patient has known history of mitral valve regurgitation. Patient stated she is feeling better today. Advised patient that this information will be shared with Dr. Hancock. Patient has no additional concerns or questions at this time. She will follow up as scheduled with Dr. Hancock on 8/18/25. Encouraged patient to call back if she has any additional needs in the interim. Patient v/u.

## 2025-07-22 NOTE — TELEPHONE ENCOUNTER
Patient has normal blood counts with high level of b12 and iron. She actually may reduce b12 to every other day if taking orally and does not require any iron tablets at this time. Will start armodafinil as discussed to see if this helps level of alertness.   JW  Iron Profile

## 2025-08-14 ENCOUNTER — TELEPHONE (OUTPATIENT)
Dept: CARDIOLOGY | Age: 65
End: 2025-08-14
Payer: MEDICARE

## 2025-08-18 ENCOUNTER — LAB (OUTPATIENT)
Dept: OTHER | Facility: HOSPITAL | Age: 65
End: 2025-08-18
Payer: MEDICARE

## 2025-08-18 ENCOUNTER — INFUSION (OUTPATIENT)
Dept: ONCOLOGY | Facility: HOSPITAL | Age: 65
End: 2025-08-18
Payer: MEDICARE

## 2025-08-18 ENCOUNTER — APPOINTMENT (OUTPATIENT)
Dept: ONCOLOGY | Facility: HOSPITAL | Age: 65
End: 2025-08-18
Payer: MEDICARE

## 2025-08-18 ENCOUNTER — OFFICE VISIT (OUTPATIENT)
Dept: ONCOLOGY | Facility: CLINIC | Age: 65
End: 2025-08-18
Payer: MEDICARE

## 2025-08-18 VITALS
SYSTOLIC BLOOD PRESSURE: 113 MMHG | TEMPERATURE: 96.8 F | HEIGHT: 60 IN | BODY MASS INDEX: 32.63 KG/M2 | HEART RATE: 100 BPM | DIASTOLIC BLOOD PRESSURE: 79 MMHG | OXYGEN SATURATION: 95 % | WEIGHT: 166.2 LBS

## 2025-08-18 DIAGNOSIS — C50.919 PRIMARY MALIGNANT NEOPLASM OF BREAST WITH METASTASIS: ICD-10-CM

## 2025-08-18 DIAGNOSIS — C79.51 MALIGNANT NEOPLASM METASTATIC TO BONE: Primary | ICD-10-CM

## 2025-08-18 DIAGNOSIS — C50.811 MALIGNANT NEOPLASM OF OVERLAPPING SITES OF RIGHT BREAST IN FEMALE, ESTROGEN RECEPTOR NEGATIVE: Primary | ICD-10-CM

## 2025-08-18 DIAGNOSIS — C50.811 MALIGNANT NEOPLASM OF OVERLAPPING SITES OF RIGHT BREAST IN FEMALE, ESTROGEN RECEPTOR NEGATIVE: ICD-10-CM

## 2025-08-18 DIAGNOSIS — R53.83 OTHER FATIGUE: ICD-10-CM

## 2025-08-18 DIAGNOSIS — Z17.1 MALIGNANT NEOPLASM OF OVERLAPPING SITES OF RIGHT BREAST IN FEMALE, ESTROGEN RECEPTOR NEGATIVE: Primary | ICD-10-CM

## 2025-08-18 DIAGNOSIS — Z17.1 MALIGNANT NEOPLASM OF OVERLAPPING SITES OF RIGHT BREAST IN FEMALE, ESTROGEN RECEPTOR NEGATIVE: ICD-10-CM

## 2025-08-18 DIAGNOSIS — D50.9 IRON DEFICIENCY ANEMIA, UNSPECIFIED IRON DEFICIENCY ANEMIA TYPE: ICD-10-CM

## 2025-08-18 LAB
ALBUMIN SERPL-MCNC: 3.9 G/DL (ref 3.5–5.2)
ALBUMIN/GLOB SERPL: 1.1 G/DL
ALP SERPL-CCNC: 101 U/L (ref 39–117)
ALT SERPL W P-5'-P-CCNC: 13 U/L (ref 1–33)
ANION GAP SERPL CALCULATED.3IONS-SCNC: 14.9 MMOL/L (ref 5–15)
AST SERPL-CCNC: 24 U/L (ref 1–32)
BASOPHILS # BLD AUTO: 0.08 10*3/MM3 (ref 0–0.2)
BASOPHILS NFR BLD AUTO: 0.9 % (ref 0–1.5)
BILIRUB SERPL-MCNC: 0.3 MG/DL (ref 0–1.2)
BUN SERPL-MCNC: 16.1 MG/DL (ref 8–23)
BUN/CREAT SERPL: 20.9 (ref 7–25)
CALCIUM SPEC-SCNC: 9.7 MG/DL (ref 8.6–10.5)
CHLORIDE SERPL-SCNC: 100 MMOL/L (ref 98–107)
CO2 SERPL-SCNC: 28.1 MMOL/L (ref 22–29)
CREAT SERPL-MCNC: 0.77 MG/DL (ref 0.57–1)
DEPRECATED RDW RBC AUTO: 56.8 FL (ref 37–54)
EGFRCR SERPLBLD CKD-EPI 2021: 86.3 ML/MIN/1.73
EOSINOPHIL # BLD AUTO: 0.27 10*3/MM3 (ref 0–0.4)
EOSINOPHIL NFR BLD AUTO: 2.9 % (ref 0.3–6.2)
ERYTHROCYTE [DISTWIDTH] IN BLOOD BY AUTOMATED COUNT: 15 % (ref 12.3–15.4)
FERRITIN SERPL-MCNC: 403.5 NG/ML (ref 13–150)
GLOBULIN UR ELPH-MCNC: 3.4 GM/DL
GLUCOSE SERPL-MCNC: 165 MG/DL (ref 65–99)
HCT VFR BLD AUTO: 41.1 % (ref 34–46.6)
HGB BLD-MCNC: 13.7 G/DL (ref 12–15.9)
IMM GRANULOCYTES # BLD AUTO: 0.05 10*3/MM3 (ref 0–0.05)
IMM GRANULOCYTES NFR BLD AUTO: 0.5 % (ref 0–0.5)
IRON 24H UR-MRATE: 70 MCG/DL (ref 37–145)
IRON SATN MFR SERPL: 20 % (ref 20–50)
LYMPHOCYTES # BLD AUTO: 1.49 10*3/MM3 (ref 0.7–3.1)
LYMPHOCYTES NFR BLD AUTO: 16.1 % (ref 19.6–45.3)
MCH RBC QN AUTO: 34.3 PG (ref 26.6–33)
MCHC RBC AUTO-ENTMCNC: 33.3 G/DL (ref 31.5–35.7)
MCV RBC AUTO: 102.8 FL (ref 79–97)
MONOCYTES # BLD AUTO: 0.56 10*3/MM3 (ref 0.1–0.9)
MONOCYTES NFR BLD AUTO: 6 % (ref 5–12)
NEUTROPHILS NFR BLD AUTO: 6.82 10*3/MM3 (ref 1.7–7)
NEUTROPHILS NFR BLD AUTO: 73.6 % (ref 42.7–76)
NRBC BLD AUTO-RTO: 0 /100 WBC (ref 0–0.2)
PLATELET # BLD AUTO: 429 10*3/MM3 (ref 140–450)
PMV BLD AUTO: 10 FL (ref 6–12)
POTASSIUM SERPL-SCNC: 4.1 MMOL/L (ref 3.5–5.2)
PROT SERPL-MCNC: 7.3 G/DL (ref 6–8.5)
RBC # BLD AUTO: 4 10*6/MM3 (ref 3.77–5.28)
SODIUM SERPL-SCNC: 143 MMOL/L (ref 136–145)
T4 FREE SERPL-MCNC: 1.29 NG/DL (ref 0.92–1.68)
TIBC SERPL-MCNC: 356 MCG/DL (ref 298–536)
TRANSFERRIN SERPL-MCNC: 239 MG/DL (ref 200–360)
TSH SERPL DL<=0.05 MIU/L-ACNC: 1.76 UIU/ML (ref 0.27–4.2)
WBC NRBC COR # BLD AUTO: 9.27 10*3/MM3 (ref 3.4–10.8)

## 2025-08-18 PROCEDURE — 80053 COMPREHEN METABOLIC PANEL: CPT | Performed by: INTERNAL MEDICINE

## 2025-08-18 PROCEDURE — 83540 ASSAY OF IRON: CPT | Performed by: INTERNAL MEDICINE

## 2025-08-18 PROCEDURE — 84466 ASSAY OF TRANSFERRIN: CPT | Performed by: INTERNAL MEDICINE

## 2025-08-18 PROCEDURE — 36415 COLL VENOUS BLD VENIPUNCTURE: CPT

## 2025-08-18 PROCEDURE — 96360 HYDRATION IV INFUSION INIT: CPT

## 2025-08-18 PROCEDURE — 82728 ASSAY OF FERRITIN: CPT | Performed by: INTERNAL MEDICINE

## 2025-08-18 PROCEDURE — 25810000003 SODIUM CHLORIDE 0.9 % SOLUTION: Performed by: INTERNAL MEDICINE

## 2025-08-18 PROCEDURE — 84443 ASSAY THYROID STIM HORMONE: CPT | Performed by: INTERNAL MEDICINE

## 2025-08-18 PROCEDURE — 85025 COMPLETE CBC W/AUTO DIFF WBC: CPT | Performed by: INTERNAL MEDICINE

## 2025-08-18 PROCEDURE — 84439 ASSAY OF FREE THYROXINE: CPT | Performed by: INTERNAL MEDICINE

## 2025-08-18 RX ADMIN — SODIUM CHLORIDE 1000 ML: 9 INJECTION, SOLUTION INTRAVENOUS at 12:41

## 2025-08-19 ENCOUNTER — SPECIALTY PHARMACY (OUTPATIENT)
Dept: PHARMACY | Facility: HOSPITAL | Age: 65
End: 2025-08-19
Payer: MEDICARE

## 2025-08-21 ENCOUNTER — TELEPHONE (OUTPATIENT)
Dept: CARDIOLOGY | Age: 65
End: 2025-08-21
Payer: MEDICARE

## 2025-08-26 ENCOUNTER — LAB (OUTPATIENT)
Dept: OTHER | Facility: HOSPITAL | Age: 65
End: 2025-08-26
Payer: MEDICARE

## 2025-08-26 ENCOUNTER — OFFICE VISIT (OUTPATIENT)
Dept: ONCOLOGY | Facility: CLINIC | Age: 65
End: 2025-08-26
Payer: MEDICARE

## 2025-08-26 VITALS
HEIGHT: 60 IN | TEMPERATURE: 97.8 F | DIASTOLIC BLOOD PRESSURE: 85 MMHG | WEIGHT: 165.4 LBS | HEART RATE: 93 BPM | SYSTOLIC BLOOD PRESSURE: 136 MMHG | OXYGEN SATURATION: 97 % | BODY MASS INDEX: 32.47 KG/M2

## 2025-08-26 DIAGNOSIS — C79.51 MALIGNANT NEOPLASM METASTATIC TO BONE: Primary | ICD-10-CM

## 2025-08-26 DIAGNOSIS — C50.811 MALIGNANT NEOPLASM OF OVERLAPPING SITES OF RIGHT BREAST IN FEMALE, ESTROGEN RECEPTOR NEGATIVE: ICD-10-CM

## 2025-08-26 DIAGNOSIS — Z17.1 MALIGNANT NEOPLASM OF OVERLAPPING SITES OF RIGHT BREAST IN FEMALE, ESTROGEN RECEPTOR NEGATIVE: ICD-10-CM

## 2025-08-26 LAB
ALBUMIN SERPL-MCNC: 3.9 G/DL (ref 3.5–5.2)
ALBUMIN/GLOB SERPL: 1.3 G/DL
ALP SERPL-CCNC: 95 U/L (ref 39–117)
ALT SERPL W P-5'-P-CCNC: 15 U/L (ref 1–33)
ANION GAP SERPL CALCULATED.3IONS-SCNC: 11 MMOL/L (ref 5–15)
AST SERPL-CCNC: 23 U/L (ref 1–32)
BASOPHILS # BLD AUTO: 0.07 10*3/MM3 (ref 0–0.2)
BASOPHILS NFR BLD AUTO: 1.2 % (ref 0–1.5)
BILIRUB SERPL-MCNC: 0.4 MG/DL (ref 0–1.2)
BUN SERPL-MCNC: 11.3 MG/DL (ref 8–23)
BUN/CREAT SERPL: 14.1 (ref 7–25)
CALCIUM SPEC-SCNC: 10.3 MG/DL (ref 8.6–10.5)
CHLORIDE SERPL-SCNC: 102 MMOL/L (ref 98–107)
CO2 SERPL-SCNC: 32 MMOL/L (ref 22–29)
CREAT SERPL-MCNC: 0.8 MG/DL (ref 0.57–1)
DEPRECATED RDW RBC AUTO: 52.1 FL (ref 37–54)
EGFRCR SERPLBLD CKD-EPI 2021: 82.4 ML/MIN/1.73
EOSINOPHIL # BLD AUTO: 0.36 10*3/MM3 (ref 0–0.4)
EOSINOPHIL NFR BLD AUTO: 6 % (ref 0.3–6.2)
ERYTHROCYTE [DISTWIDTH] IN BLOOD BY AUTOMATED COUNT: 13.8 % (ref 12.3–15.4)
GLOBULIN UR ELPH-MCNC: 3.1 GM/DL
GLUCOSE SERPL-MCNC: 152 MG/DL (ref 65–99)
HCT VFR BLD AUTO: 38.9 % (ref 34–46.6)
HGB BLD-MCNC: 12.6 G/DL (ref 12–15.9)
IMM GRANULOCYTES # BLD AUTO: 0.02 10*3/MM3 (ref 0–0.05)
IMM GRANULOCYTES NFR BLD AUTO: 0.3 % (ref 0–0.5)
LYMPHOCYTES # BLD AUTO: 1.2 10*3/MM3 (ref 0.7–3.1)
LYMPHOCYTES NFR BLD AUTO: 20 % (ref 19.6–45.3)
MCH RBC QN AUTO: 32.7 PG (ref 26.6–33)
MCHC RBC AUTO-ENTMCNC: 32.4 G/DL (ref 31.5–35.7)
MCV RBC AUTO: 101 FL (ref 79–97)
MONOCYTES # BLD AUTO: 0.49 10*3/MM3 (ref 0.1–0.9)
MONOCYTES NFR BLD AUTO: 8.2 % (ref 5–12)
NEUTROPHILS NFR BLD AUTO: 3.86 10*3/MM3 (ref 1.7–7)
NEUTROPHILS NFR BLD AUTO: 64.3 % (ref 42.7–76)
NRBC BLD AUTO-RTO: 0 /100 WBC (ref 0–0.2)
PLATELET # BLD AUTO: 359 10*3/MM3 (ref 140–450)
PMV BLD AUTO: 10.2 FL (ref 6–12)
POTASSIUM SERPL-SCNC: 3.6 MMOL/L (ref 3.5–5.2)
PROT SERPL-MCNC: 7 G/DL (ref 6–8.5)
RBC # BLD AUTO: 3.85 10*6/MM3 (ref 3.77–5.28)
SODIUM SERPL-SCNC: 145 MMOL/L (ref 136–145)
WBC NRBC COR # BLD AUTO: 6 10*3/MM3 (ref 3.4–10.8)

## 2025-08-26 PROCEDURE — 36415 COLL VENOUS BLD VENIPUNCTURE: CPT

## 2025-08-26 PROCEDURE — 85025 COMPLETE CBC W/AUTO DIFF WBC: CPT | Performed by: INTERNAL MEDICINE

## 2025-08-26 PROCEDURE — 80053 COMPREHEN METABOLIC PANEL: CPT | Performed by: INTERNAL MEDICINE

## 2025-08-27 ENCOUNTER — SPECIALTY PHARMACY (OUTPATIENT)
Dept: PHARMACY | Facility: HOSPITAL | Age: 65
End: 2025-08-27
Payer: MEDICARE

## (undated) DEVICE — GLV SURG TRIUMPH CLASSIC PF LTX 8.5 STRL

## (undated) DEVICE — DIFFUSER: Brand: CORE, MAESTRO

## (undated) DEVICE — SPNG GZ WOVN 4X4IN 12PLY 10/BX STRL

## (undated) DEVICE — FLEX ADVANTAGE 1500CC: Brand: FLEX ADVANTAGE

## (undated) DEVICE — GLV SURG BIOGEL LTX PF 7 1/2

## (undated) DEVICE — ENCORE® LATEX ORTHO SIZE 9, STERILE LATEX POWDER-FREE SURGICAL GLOVE: Brand: ENCORE

## (undated) DEVICE — ADAPT CLN BIOGUARD AIR/H2O DISP

## (undated) DEVICE — CANN NASL CO2 TRULINK W/O2 A/

## (undated) DEVICE — 3M™ IOBAN™ 2 ANTIMICROBIAL INCISE DRAPE 6650EZ: Brand: IOBAN™ 2

## (undated) DEVICE — PAD CAST SOF ROL NS 6IN

## (undated) DEVICE — 3M™ IOBAN™ 2 ANTIMICROBIAL INCISE DRAPE 6640EZ: Brand: IOBAN™ 2

## (undated) DEVICE — Device

## (undated) DEVICE — PREMIUM WET SKIN PREP TRAY: Brand: MEDLINE INDUSTRIES, INC.

## (undated) DEVICE — SENSR O2 OXIMAX FNGR A/ 18IN NONSTR

## (undated) DEVICE — T-DRAPE,EXTREMITY,STERILE: Brand: MEDLINE

## (undated) DEVICE — UNDERCAST PADDING: Brand: DEROYAL

## (undated) DEVICE — HANDPIECE SET WITH COAXIAL HIGH FLOW TIP AND SUCTION TUBE: Brand: INTERPULSE

## (undated) DEVICE — GOWN,ECLIPSE,FABRIC-REINFORCED,X-LARGE: Brand: MEDLINE

## (undated) DEVICE — SUT VIC 2/0 CT1 36IN

## (undated) DEVICE — HYBRID TUBING/CAP SET FOR OLYMPUS® SCOPES: Brand: ERBE

## (undated) DEVICE — APPL CHLORAPREP W/TINT 26ML ORNG

## (undated) DEVICE — GOWN ,SIRUS,NONREINFORCED 3XL: Brand: MEDLINE

## (undated) DEVICE — DRP C/ARMOR

## (undated) DEVICE — MEDI-VAC YANKAUER SUCTION HANDLE W/BULBOUS TIP: Brand: CARDINAL HEALTH

## (undated) DEVICE — SPONGE,LAP,12"X12",XR,ST,5/PK,40PK/CS: Brand: MEDLINE

## (undated) DEVICE — PK NEURO SPINE 40

## (undated) DEVICE — ADHS SKIN DERMABOND TOP ADVANCED

## (undated) DEVICE — BIT DRL 3FLUT QC NDL PT 3.2X145MM

## (undated) DEVICE — SOL NACL 0.9PCT 1000ML

## (undated) DEVICE — TUBING, SUCTION, 1/4" X 10', STRAIGHT: Brand: MEDLINE

## (undated) DEVICE — KT ORCA ORCAPOD DISP STRL

## (undated) DEVICE — SYRINGE, LUER SLIP, STERILE, 60ML: Brand: MEDLINE

## (undated) DEVICE — 6.0MM PRECISION ROUND

## (undated) DEVICE — DRAPE,REIN 53X77,STERILE: Brand: MEDLINE

## (undated) DEVICE — FRCP BX RADJAW4 NDL 2.8 240CM LG OG BX40

## (undated) DEVICE — MAT FLR ABSORBENT LG 4FT 10 2.5FT

## (undated) DEVICE — APPL DURAPREP IODOPHOR APL 26ML

## (undated) DEVICE — TOTAL TRAY, 16FR 10ML SIL FOLEY, URN: Brand: MEDLINE

## (undated) DEVICE — CANN O2 ETCO2 FITS ALL CONN CO2 SMPL A/ 7IN DISP LF

## (undated) DEVICE — PK ORTHO MAJ 40

## (undated) DEVICE — BNDG ELAS ELITE V/CLOSE 6IN 5YD LF STRL

## (undated) DEVICE — TOWEL,OR,DSP,ST,BLUE,STD,4/PK,20PK/CS: Brand: MEDLINE

## (undated) DEVICE — DRP C/ARM 41X74IN

## (undated) DEVICE — NDL SPINE 18G 31/2IN PNK

## (undated) DEVICE — ELECTRD BLD EXT EDGE 1P COAT 6.5IN STRL

## (undated) DEVICE — ENCORE® LATEX ORTHO SIZE 8.5, STERILE LATEX POWDER-FREE SURGICAL GLOVE: Brand: ENCORE

## (undated) DEVICE — IMPLANTABLE DEVICE
Type: IMPLANTABLE DEVICE | Site: TIBIA | Status: NON-FUNCTIONAL
Removed: 2017-12-06

## (undated) DEVICE — PAD,ABDOMINAL,8"X10",ST,LF: Brand: MEDLINE

## (undated) DEVICE — KT DRN EVAC WND PVC PCH WTROC RND 10F400

## (undated) DEVICE — GLV SURG BIOGEL LTX PF 7

## (undated) DEVICE — OIL CARTRIDGE: Brand: CORE, MAESTRO

## (undated) DEVICE — UNDYED BRAIDED (POLYGLACTIN 910), SYNTHETIC ABSORBABLE SUTURE: Brand: COATED VICRYL

## (undated) DEVICE — GAUZE,SPONGE,FLUFF,6"X6.75",STRL,10/TRAY: Brand: MEDLINE

## (undated) DEVICE — SUT VIC 2/0 CT2 27IN J269H

## (undated) DEVICE — ENCORE® LATEX ORTHO SIZE 8, STERILE LATEX POWDER-FREE SURGICAL GLOVE: Brand: ENCORE

## (undated) DEVICE — GOWN ISOL W/THUMB UNIV BLU BX/15

## (undated) DEVICE — STPLR SKIN VISISTAT WD 35CT

## (undated) DEVICE — LN SMPL CO2 SHTRM SD STREAM W/M LUER

## (undated) DEVICE — BITEBLOCK OMNI BLOC

## (undated) DEVICE — OCCLUSIVE GAUZE STRIP,3% BISMUTH TRIBROMOPHENATE IN PETROLATUM BLEND: Brand: XEROFORM